# Patient Record
Sex: MALE | Race: BLACK OR AFRICAN AMERICAN | Employment: OTHER | URBAN - METROPOLITAN AREA
[De-identification: names, ages, dates, MRNs, and addresses within clinical notes are randomized per-mention and may not be internally consistent; named-entity substitution may affect disease eponyms.]

---

## 2017-01-24 ENCOUNTER — NURSE ONLY (OUTPATIENT)
Dept: HEMATOLOGY/ONCOLOGY | Facility: HOSPITAL | Age: 72
End: 2017-01-24
Attending: INTERNAL MEDICINE
Payer: MEDICARE

## 2017-01-24 DIAGNOSIS — D63.1 ANEMIA OF CHRONIC RENAL FAILURE, STAGE 3 (MODERATE) (HCC): ICD-10-CM

## 2017-01-24 DIAGNOSIS — N18.30 CHRONIC KIDNEY DISEASE, STAGE III (MODERATE) (HCC): Primary | ICD-10-CM

## 2017-01-24 DIAGNOSIS — N18.30 ANEMIA OF CHRONIC RENAL FAILURE, STAGE 3 (MODERATE) (HCC): ICD-10-CM

## 2017-01-24 LAB
FERRITIN SERPL IA-MCNC: 256 NG/ML (ref 24–336)
HCT VFR BLD AUTO: 31.9 % (ref 41–52)
HGB BLD-MCNC: 10.5 G/DL (ref 13.5–17.5)
IRON SATN MFR SERPL: 19 % (ref 20–55)
IRON SERPL-MCNC: 45 MCG/DL (ref 45–182)
TIBC SERPL-MCNC: 240 MCG/DL (ref 228–428)
TRANSFERRIN SERPL-MCNC: 182 MG/DL (ref 180–329)

## 2017-01-24 PROCEDURE — 96372 THER/PROPH/DIAG INJ SC/IM: CPT

## 2017-01-24 PROCEDURE — 36415 COLL VENOUS BLD VENIPUNCTURE: CPT

## 2017-01-24 PROCEDURE — 85018 HEMOGLOBIN: CPT

## 2017-01-24 PROCEDURE — 83540 ASSAY OF IRON: CPT

## 2017-01-24 PROCEDURE — 84466 ASSAY OF TRANSFERRIN: CPT

## 2017-01-24 PROCEDURE — 85014 HEMATOCRIT: CPT

## 2017-01-24 PROCEDURE — 82728 ASSAY OF FERRITIN: CPT

## 2017-01-24 NOTE — PROGRESS NOTES
Pt here for Q 5 week h/h and possible aranesp. The iron studies (tibc/iron, ferritin) are Q 12 weeks and due in 1-2 weeks, so pt agreed to have these drawn today as well. He is ambulatory, by himself today, and states he is feeling well.     Labs drawn vi

## 2017-01-25 ENCOUNTER — HOSPITAL (OUTPATIENT)
Dept: OTHER | Age: 72
End: 2017-01-25
Attending: INTERNAL MEDICINE

## 2017-01-27 PROBLEM — D63.1 ANEMIA IN CHRONIC KIDNEY DISEASE(285.21): Status: ACTIVE | Noted: 2017-01-27

## 2017-01-27 PROBLEM — D50.9 IRON DEFICIENCY ANEMIA, UNSPECIFIED: Status: ACTIVE | Noted: 2017-01-27

## 2017-01-27 PROBLEM — N18.9 ANEMIA IN CHRONIC KIDNEY DISEASE(285.21): Status: ACTIVE | Noted: 2017-01-27

## 2017-01-28 ENCOUNTER — APPOINTMENT (OUTPATIENT)
Dept: LAB | Facility: HOSPITAL | Age: 72
End: 2017-01-28
Attending: INTERNAL MEDICINE
Payer: MEDICARE

## 2017-01-28 DIAGNOSIS — I10 ESSENTIAL HYPERTENSION: ICD-10-CM

## 2017-01-28 DIAGNOSIS — N17.9 ACUTE KIDNEY FAILURE, UNSPECIFIED (HCC): ICD-10-CM

## 2017-01-28 DIAGNOSIS — E11.8 TYPE II DIABETES MELLITUS WITH COMPLICATION (HCC): ICD-10-CM

## 2017-01-28 DIAGNOSIS — D64.9 ANEMIA, UNSPECIFIED: ICD-10-CM

## 2017-01-28 LAB
ALBUMIN SERPL BCP-MCNC: 2.8 G/DL (ref 3.5–4.8)
ANION GAP SERPL CALC-SCNC: 10 MMOL/L (ref 0–18)
BACTERIA UR QL AUTO: NEGATIVE /HPF
BILIRUB UR QL: NEGATIVE
BUN SERPL-MCNC: 29 MG/DL (ref 8–20)
BUN/CREAT SERPL: 19 (ref 10–20)
CALCIUM SERPL-MCNC: 8.3 MG/DL (ref 8.5–10.5)
CHLORIDE SERPL-SCNC: 103 MMOL/L (ref 95–110)
CLARITY UR: CLEAR
CO2 SERPL-SCNC: 27 MMOL/L (ref 22–32)
COLOR UR: YELLOW
CREAT SERPL-MCNC: 1.53 MG/DL (ref 0.5–1.5)
CREAT UR-MCNC: 68.4 MG/DL
GLUCOSE SERPL-MCNC: 198 MG/DL (ref 70–99)
GLUCOSE UR-MCNC: NEGATIVE MG/DL
KETONES UR-MCNC: NEGATIVE MG/DL
LEUKOCYTE ESTERASE UR QL STRIP.AUTO: NEGATIVE
MICROALBUMIN UR-MCNC: 135.6 MG/DL (ref 0–1.8)
MICROALBUMIN/CREAT UR: 1982.5 MG/G{CREAT} (ref 0–20)
NITRITE UR QL STRIP.AUTO: NEGATIVE
OSMOLALITY UR CALC.SUM OF ELEC: 301 MOSM/KG (ref 275–295)
PH UR: 5 [PH] (ref 5–8)
PHOSPHATE SERPL-MCNC: 2.4 MG/DL (ref 2.4–4.7)
POTASSIUM SERPL-SCNC: 3.5 MMOL/L (ref 3.3–5.1)
PROT UR-MCNC: 100 MG/DL
RBC #/AREA URNS AUTO: <1 /HPF
SODIUM SERPL-SCNC: 140 MMOL/L (ref 136–144)
SP GR UR STRIP: 1.01 (ref 1–1.03)
UROBILINOGEN UR STRIP-ACNC: <2
VIT C UR-MCNC: NEGATIVE MG/DL
WBC #/AREA URNS AUTO: 1 /HPF

## 2017-01-28 PROCEDURE — 83970 ASSAY OF PARATHORMONE: CPT

## 2017-01-28 PROCEDURE — 82043 UR ALBUMIN QUANTITATIVE: CPT

## 2017-01-28 PROCEDURE — 85014 HEMATOCRIT: CPT

## 2017-01-28 PROCEDURE — 36415 COLL VENOUS BLD VENIPUNCTURE: CPT

## 2017-01-28 PROCEDURE — 80069 RENAL FUNCTION PANEL: CPT

## 2017-01-28 PROCEDURE — 85018 HEMOGLOBIN: CPT

## 2017-01-28 PROCEDURE — 82570 ASSAY OF URINE CREATININE: CPT

## 2017-01-28 PROCEDURE — 81001 URINALYSIS AUTO W/SCOPE: CPT

## 2017-01-30 LAB — PTH-INTACT SERPL-MCNC: 65.5 PG/ML (ref 12–88)

## 2017-01-31 ENCOUNTER — OFFICE VISIT (OUTPATIENT)
Dept: HEMATOLOGY/ONCOLOGY | Facility: HOSPITAL | Age: 72
End: 2017-01-31
Attending: INTERNAL MEDICINE
Payer: MEDICARE

## 2017-01-31 VITALS
RESPIRATION RATE: 16 BRPM | SYSTOLIC BLOOD PRESSURE: 158 MMHG | DIASTOLIC BLOOD PRESSURE: 69 MMHG | TEMPERATURE: 99 F | HEART RATE: 76 BPM

## 2017-01-31 DIAGNOSIS — N18.9 ANEMIA IN CHRONIC KIDNEY DISEASE(285.21): Primary | ICD-10-CM

## 2017-01-31 DIAGNOSIS — D50.9 IRON DEFICIENCY ANEMIA, UNSPECIFIED: ICD-10-CM

## 2017-01-31 DIAGNOSIS — D63.1 ANEMIA IN CHRONIC KIDNEY DISEASE(285.21): Primary | ICD-10-CM

## 2017-01-31 DIAGNOSIS — N18.30 CHRONIC KIDNEY DISEASE, STAGE III (MODERATE) (HCC): ICD-10-CM

## 2017-01-31 PROCEDURE — 96374 THER/PROPH/DIAG INJ IV PUSH: CPT

## 2017-01-31 RX ORDER — SODIUM CHLORIDE 0.9 % (FLUSH) 0.9 %
SYRINGE (ML) INJECTION
Status: DISCONTINUED
Start: 2017-01-31 | End: 2017-01-31

## 2017-01-31 NOTE — PROGRESS NOTES
Pt presents to infusion today for 1 of 5 venofer. B/P elevated will recheck at end of infusion. Pt states that he has been fatigued, light headed and dizzy at times. He is also short of breath but attributes it to a recent episode of pneumonia.   Pt stat

## 2017-02-01 ENCOUNTER — OFFICE VISIT (OUTPATIENT)
Dept: HEMATOLOGY/ONCOLOGY | Facility: HOSPITAL | Age: 72
End: 2017-02-01
Attending: INTERNAL MEDICINE
Payer: MEDICARE

## 2017-02-01 VITALS
HEART RATE: 76 BPM | TEMPERATURE: 99 F | RESPIRATION RATE: 16 BRPM | SYSTOLIC BLOOD PRESSURE: 147 MMHG | DIASTOLIC BLOOD PRESSURE: 61 MMHG

## 2017-02-01 DIAGNOSIS — D63.1 ANEMIA IN CHRONIC KIDNEY DISEASE(285.21): Primary | ICD-10-CM

## 2017-02-01 DIAGNOSIS — D50.9 IRON DEFICIENCY ANEMIA, UNSPECIFIED: ICD-10-CM

## 2017-02-01 DIAGNOSIS — N18.30 CHRONIC KIDNEY DISEASE, STAGE III (MODERATE) (HCC): ICD-10-CM

## 2017-02-01 DIAGNOSIS — N18.9 ANEMIA IN CHRONIC KIDNEY DISEASE(285.21): Primary | ICD-10-CM

## 2017-02-01 PROCEDURE — 96374 THER/PROPH/DIAG INJ IV PUSH: CPT

## 2017-02-01 RX ORDER — SODIUM CHLORIDE 0.9 % (FLUSH) 0.9 %
SYRINGE (ML) INJECTION
Status: DISCONTINUED
Start: 2017-02-01 | End: 2017-02-01

## 2017-02-01 NOTE — PROGRESS NOTES
Pt here for 2 of 5 venofer. . Continue to have mild sob on excertion,fatigue and occasional dizziness. Recovering from recent pneumonia. Iv started to right hand, venofer given over 5 mins and tolerated well. bp elevated  On arrival ,rechecked and better.  P

## 2017-02-06 ENCOUNTER — OFFICE VISIT (OUTPATIENT)
Dept: HEMATOLOGY/ONCOLOGY | Facility: HOSPITAL | Age: 72
End: 2017-02-06
Attending: INTERNAL MEDICINE
Payer: MEDICARE

## 2017-02-06 VITALS — HEART RATE: 79 BPM | SYSTOLIC BLOOD PRESSURE: 165 MMHG | RESPIRATION RATE: 18 BRPM | DIASTOLIC BLOOD PRESSURE: 71 MMHG

## 2017-02-06 DIAGNOSIS — N18.9 ANEMIA IN CHRONIC KIDNEY DISEASE(285.21): Primary | ICD-10-CM

## 2017-02-06 DIAGNOSIS — N18.30 CHRONIC KIDNEY DISEASE, STAGE III (MODERATE) (HCC): ICD-10-CM

## 2017-02-06 DIAGNOSIS — D63.1 ANEMIA IN CHRONIC KIDNEY DISEASE(285.21): Primary | ICD-10-CM

## 2017-02-06 DIAGNOSIS — D50.9 IRON DEFICIENCY ANEMIA, UNSPECIFIED: ICD-10-CM

## 2017-02-06 PROCEDURE — 96374 THER/PROPH/DIAG INJ IV PUSH: CPT

## 2017-02-06 RX ORDER — SODIUM CHLORIDE 0.9 % (FLUSH) 0.9 %
SYRINGE (ML) INJECTION
Status: DISCONTINUED
Start: 2017-02-06 | End: 2017-02-06

## 2017-02-06 NOTE — PROGRESS NOTES
Pt to infusion for Venofer 3/5. Labs from 1/28 Hgb 10 Hct 31.1. Pt reports some fatigue and SOB, especially with exertion. Denies any s/e from previous Venofer infusions. PIV started in [facility] on 2nd attempt.  Venofer infusion given over 5 minutes without any

## 2017-02-09 ENCOUNTER — TELEPHONE (OUTPATIENT)
Dept: HEMATOLOGY/ONCOLOGY | Facility: HOSPITAL | Age: 72
End: 2017-02-09

## 2017-02-09 ENCOUNTER — APPOINTMENT (OUTPATIENT)
Dept: CT IMAGING | Facility: HOSPITAL | Age: 72
End: 2017-02-09
Attending: EMERGENCY MEDICINE
Payer: MEDICARE

## 2017-02-09 ENCOUNTER — HOSPITAL ENCOUNTER (EMERGENCY)
Facility: HOSPITAL | Age: 72
Discharge: HOME | End: 2017-02-09
Attending: EMERGENCY MEDICINE
Payer: MEDICARE

## 2017-02-09 ENCOUNTER — APPOINTMENT (OUTPATIENT)
Dept: GENERAL RADIOLOGY | Facility: HOSPITAL | Age: 72
End: 2017-02-09
Attending: EMERGENCY MEDICINE
Payer: MEDICARE

## 2017-02-09 ENCOUNTER — OFFICE VISIT (OUTPATIENT)
Dept: HEMATOLOGY/ONCOLOGY | Facility: HOSPITAL | Age: 72
End: 2017-02-09
Attending: INTERNAL MEDICINE
Payer: MEDICARE

## 2017-02-09 VITALS
RESPIRATION RATE: 18 BRPM | DIASTOLIC BLOOD PRESSURE: 80 MMHG | OXYGEN SATURATION: 98 % | TEMPERATURE: 98 F | BODY MASS INDEX: 30.66 KG/M2 | HEIGHT: 65 IN | WEIGHT: 184 LBS | SYSTOLIC BLOOD PRESSURE: 179 MMHG | HEART RATE: 79 BPM

## 2017-02-09 VITALS
SYSTOLIC BLOOD PRESSURE: 165 MMHG | TEMPERATURE: 98 F | DIASTOLIC BLOOD PRESSURE: 71 MMHG | HEART RATE: 83 BPM | RESPIRATION RATE: 16 BRPM

## 2017-02-09 DIAGNOSIS — N18.9 ANEMIA IN CHRONIC KIDNEY DISEASE(285.21): Primary | ICD-10-CM

## 2017-02-09 DIAGNOSIS — S40.021A ARM CONTUSION, RIGHT, INITIAL ENCOUNTER: ICD-10-CM

## 2017-02-09 DIAGNOSIS — N18.30 CHRONIC KIDNEY DISEASE, STAGE III (MODERATE) (HCC): ICD-10-CM

## 2017-02-09 DIAGNOSIS — D63.1 ANEMIA IN CHRONIC KIDNEY DISEASE(285.21): Primary | ICD-10-CM

## 2017-02-09 DIAGNOSIS — S60.222A CONTUSION OF LEFT HAND, INITIAL ENCOUNTER: ICD-10-CM

## 2017-02-09 DIAGNOSIS — S00.03XA CONTUSION OF SCALP, INITIAL ENCOUNTER: Primary | ICD-10-CM

## 2017-02-09 DIAGNOSIS — D50.9 IRON DEFICIENCY ANEMIA, UNSPECIFIED: ICD-10-CM

## 2017-02-09 PROCEDURE — 73060 X-RAY EXAM OF HUMERUS: CPT

## 2017-02-09 PROCEDURE — 96374 THER/PROPH/DIAG INJ IV PUSH: CPT

## 2017-02-09 PROCEDURE — 73130 X-RAY EXAM OF HAND: CPT

## 2017-02-09 PROCEDURE — 96372 THER/PROPH/DIAG INJ SC/IM: CPT

## 2017-02-09 PROCEDURE — 70450 CT HEAD/BRAIN W/O DYE: CPT

## 2017-02-09 PROCEDURE — 99284 EMERGENCY DEPT VISIT MOD MDM: CPT

## 2017-02-09 RX ORDER — HYDROMORPHONE HYDROCHLORIDE 1 MG/ML
1 INJECTION, SOLUTION INTRAMUSCULAR; INTRAVENOUS; SUBCUTANEOUS ONCE
Status: COMPLETED | OUTPATIENT
Start: 2017-02-09 | End: 2017-02-09

## 2017-02-09 RX ORDER — ONDANSETRON 4 MG/1
4 TABLET, ORALLY DISINTEGRATING ORAL ONCE
Status: COMPLETED | OUTPATIENT
Start: 2017-02-09 | End: 2017-02-09

## 2017-02-09 RX ORDER — SODIUM CHLORIDE 0.9 % (FLUSH) 0.9 %
SYRINGE (ML) INJECTION
Status: DISCONTINUED
Start: 2017-02-09 | End: 2017-02-09

## 2017-02-09 RX ORDER — HYDROCODONE BITARTRATE AND ACETAMINOPHEN 5; 325 MG/1; MG/1
1 TABLET ORAL EVERY 4 HOURS PRN
Qty: 14 TABLET | Refills: 0 | Status: SHIPPED | OUTPATIENT
Start: 2017-02-09 | End: 2017-02-16

## 2017-02-09 NOTE — TELEPHONE ENCOUNTER
Lexis Lewis called and said he just left here receiving Iron and when he got home he took out his garbage and bend over to pick something off the ground and fell out. He laid out on the ground then picked himself up. But he is still disoriented.  Please advise

## 2017-02-09 NOTE — ED NOTES
Pt presents to the er with c/o fall, left wrist pain, right arm pain and shoulder, and abrasion and hematoma to forehead. Pt reports that he bent over to  papers that fell out of a garage can and he toppled froeword on to cement.  Pt sts he sat there

## 2017-02-09 NOTE — ED INITIAL ASSESSMENT (HPI)
Pt states he bent over to  some paper outside today and fall over hit head on pavement dorothea knee pain, + loc

## 2017-02-09 NOTE — TELEPHONE ENCOUNTER
Returned call to patient, patient states after going home from infusion appointment today he was picking up a paper on the ground from the garbage and fell and may have passed out.   He laid on the ground then picked himself up, at first disoriented, better

## 2017-02-09 NOTE — ED PROVIDER NOTES
Patient Seen in: [facility] AND [facility] Emergency Department    History   Patient presents with:  Fall (musculoskeletal, neurologic)    Stated Complaint: fall, head injury     HPI    Patient presents after fall.   Patient went outside to get the trash cans the directed. Budesonide-Formoterol Fumarate (SYMBICORT) 160-4.5 MCG/ACT Inhalation Aerosol,  Inhale 2 puffs into the lungs 2 (two) times daily. Vitamin D3 2000 UNITS Oral Cap,  Take 2,000 Units by mouth daily.    indapamide 2.5 MG Oral Tab,     chlorthalid SpO2 02/09/17 1422 96 %   O2 Device 02/09/17 1422 None (Room air)       Current:/80 mmHg  Pulse 79  Temp(Src) 97.9 °F (36.6 °C) (Temporal)  Resp 18  Ht 165.1 cm (5' 5\")  Wt 83.462 kg  BMI 30.62 kg/m2  SpO2 98%        Physical Exam  Constitutional: Course   Labs Reviewed - No data to display     MDM     96% Normal     Disposition and Plan     Clinical Impression:  Contusion of scalp, initial encounter  (primary encounter diagnosis)  Contusion of left hand, initial encounter  Arm contusion, right, ini

## 2017-02-09 NOTE — ED NOTES
Pt given discharge instructions, prescriptions and follow up. Questions answered and pt verbalized understanding.  Pt discharged home via wc with family

## 2017-02-09 NOTE — PROGRESS NOTES
Pt presents to infusion today for 4 of 5 venofer.  B/P elevated will recheck at end of infusion.  Pt states that he has been fatigued, light headed and dizzy at times.  He is also short of breath but attributes it to a recent episode of pneumonia.  Pt stat
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
## 2017-02-13 ENCOUNTER — OFFICE VISIT (OUTPATIENT)
Dept: HEMATOLOGY/ONCOLOGY | Facility: HOSPITAL | Age: 72
End: 2017-02-13
Attending: INTERNAL MEDICINE
Payer: MEDICARE

## 2017-02-13 VITALS
SYSTOLIC BLOOD PRESSURE: 165 MMHG | RESPIRATION RATE: 16 BRPM | HEART RATE: 87 BPM | TEMPERATURE: 99 F | DIASTOLIC BLOOD PRESSURE: 69 MMHG

## 2017-02-13 DIAGNOSIS — D50.9 IRON DEFICIENCY ANEMIA, UNSPECIFIED: ICD-10-CM

## 2017-02-13 DIAGNOSIS — N18.30 CHRONIC KIDNEY DISEASE, STAGE III (MODERATE) (HCC): ICD-10-CM

## 2017-02-13 DIAGNOSIS — N18.9 ANEMIA IN CHRONIC KIDNEY DISEASE(285.21): Primary | ICD-10-CM

## 2017-02-13 DIAGNOSIS — D63.1 ANEMIA IN CHRONIC KIDNEY DISEASE(285.21): Primary | ICD-10-CM

## 2017-02-13 PROCEDURE — 96374 THER/PROPH/DIAG INJ IV PUSH: CPT

## 2017-02-13 RX ORDER — 0.9 % SODIUM CHLORIDE 0.9 %
VIAL (ML) INJECTION
Status: DISCONTINUED
Start: 2017-02-13 | End: 2017-02-13

## 2017-02-13 NOTE — PROGRESS NOTES
Arleen Allen to infusion today for 5 of 5 Venofer. He arrives with his wife. After patient's last Venofer, patient had fallen at home. He states he felt fine, was not dizzy or lightheaded.  He states he went to pick-up a piece of trash from the ground and he kep

## 2017-02-28 ENCOUNTER — NURSE ONLY (OUTPATIENT)
Dept: HEMATOLOGY/ONCOLOGY | Facility: HOSPITAL | Age: 72
End: 2017-02-28
Attending: INTERNAL MEDICINE
Payer: MEDICARE

## 2017-02-28 VITALS
HEART RATE: 75 BPM | TEMPERATURE: 98 F | DIASTOLIC BLOOD PRESSURE: 79 MMHG | RESPIRATION RATE: 18 BRPM | SYSTOLIC BLOOD PRESSURE: 164 MMHG

## 2017-02-28 DIAGNOSIS — N18.30 ANEMIA OF CHRONIC RENAL FAILURE, STAGE 3 (MODERATE) (HCC): ICD-10-CM

## 2017-02-28 DIAGNOSIS — D63.1 ANEMIA OF CHRONIC RENAL FAILURE, STAGE 3 (MODERATE) (HCC): ICD-10-CM

## 2017-02-28 DIAGNOSIS — N18.30 CHRONIC KIDNEY DISEASE, STAGE III (MODERATE) (HCC): Primary | ICD-10-CM

## 2017-02-28 LAB
HCT VFR BLD AUTO: 34.4 % (ref 41–52)
HGB BLD-MCNC: 11.4 G/DL (ref 13.5–17.5)

## 2017-02-28 PROCEDURE — 85018 HEMOGLOBIN: CPT

## 2017-02-28 PROCEDURE — 36415 COLL VENOUS BLD VENIPUNCTURE: CPT

## 2017-02-28 PROCEDURE — 85014 HEMATOCRIT: CPT

## 2017-02-28 NOTE — PROGRESS NOTES
Pt here for Q 5 week h/h and possible aranesp. The iron studies (tibc/iron, ferritin) were Q 12 weeks and drawb in Jan.  He is ambulatory, by himself today, and states he is feeling well. He did fall 2/9/17 - sustained contusions.   Denies feeling weak or

## 2017-04-04 ENCOUNTER — NURSE ONLY (OUTPATIENT)
Dept: HEMATOLOGY/ONCOLOGY | Facility: HOSPITAL | Age: 72
End: 2017-04-04
Attending: INTERNAL MEDICINE
Payer: MEDICARE

## 2017-04-04 VITALS
RESPIRATION RATE: 18 BRPM | DIASTOLIC BLOOD PRESSURE: 62 MMHG | HEART RATE: 76 BPM | TEMPERATURE: 99 F | SYSTOLIC BLOOD PRESSURE: 152 MMHG

## 2017-04-04 DIAGNOSIS — N18.30 CHRONIC KIDNEY DISEASE, STAGE III (MODERATE) (HCC): Primary | ICD-10-CM

## 2017-04-04 DIAGNOSIS — N18.30 ANEMIA OF CHRONIC RENAL FAILURE, STAGE 3 (MODERATE) (HCC): ICD-10-CM

## 2017-04-04 DIAGNOSIS — D63.1 ANEMIA OF CHRONIC RENAL FAILURE, STAGE 3 (MODERATE) (HCC): ICD-10-CM

## 2017-04-04 PROCEDURE — 36415 COLL VENOUS BLD VENIPUNCTURE: CPT

## 2017-04-04 PROCEDURE — 96372 THER/PROPH/DIAG INJ SC/IM: CPT

## 2017-04-04 PROCEDURE — 85018 HEMOGLOBIN: CPT

## 2017-04-04 PROCEDURE — 85014 HEMATOCRIT: CPT

## 2017-04-04 NOTE — PROGRESS NOTES
Pt here for Q 5 week h/h and possible aranesp. Labs drawn via left ac x1.  2x2 gauze and coban wrap applied. Pt denied any dizziness/fatigue ,some sob on excertion. Pt sent to waiting  area -  await h/h results.      Recent Labs   Lab  04/04/17   [id number]

## 2017-04-25 PROBLEM — G47.33 OSA (OBSTRUCTIVE SLEEP APNEA): Status: ACTIVE | Noted: 2017-04-25

## 2017-04-25 PROBLEM — J45.40 MODERATE PERSISTENT ASTHMA WITHOUT COMPLICATION (HCC): Status: ACTIVE | Noted: 2017-04-25

## 2017-04-25 PROBLEM — J45.40 MODERATE PERSISTENT ASTHMA WITHOUT COMPLICATION: Status: ACTIVE | Noted: 2017-04-25

## 2017-05-09 ENCOUNTER — NURSE ONLY (OUTPATIENT)
Dept: HEMATOLOGY/ONCOLOGY | Facility: HOSPITAL | Age: 72
End: 2017-05-09
Attending: INTERNAL MEDICINE
Payer: MEDICARE

## 2017-05-09 VITALS
RESPIRATION RATE: 18 BRPM | SYSTOLIC BLOOD PRESSURE: 143 MMHG | HEART RATE: 75 BPM | TEMPERATURE: 98 F | DIASTOLIC BLOOD PRESSURE: 65 MMHG

## 2017-05-09 DIAGNOSIS — N18.30 ANEMIA OF CHRONIC RENAL FAILURE, STAGE 3 (MODERATE) (HCC): ICD-10-CM

## 2017-05-09 DIAGNOSIS — D63.1 ANEMIA OF CHRONIC RENAL FAILURE, STAGE 3 (MODERATE) (HCC): ICD-10-CM

## 2017-05-09 DIAGNOSIS — N18.30 CHRONIC KIDNEY DISEASE, STAGE III (MODERATE) (HCC): Primary | ICD-10-CM

## 2017-05-09 PROCEDURE — 84466 ASSAY OF TRANSFERRIN: CPT

## 2017-05-09 PROCEDURE — 85018 HEMOGLOBIN: CPT

## 2017-05-09 PROCEDURE — 82728 ASSAY OF FERRITIN: CPT

## 2017-05-09 PROCEDURE — 85014 HEMATOCRIT: CPT

## 2017-05-09 PROCEDURE — 83540 ASSAY OF IRON: CPT

## 2017-05-09 PROCEDURE — 36415 COLL VENOUS BLD VENIPUNCTURE: CPT

## 2017-05-09 NOTE — PROGRESS NOTES
Pt here for Q 5 week h/h and possible aranesp. The iron studies (tibc/iron, ferritin) are Q 12 weeks, last drawn in January -- drawn again today, 5/9. He is ambulatory, by himself today. Jesúskobyselina Barreto He did fall 2/9/17 - sustained contusions.   Denies feeling weak o

## 2017-06-13 ENCOUNTER — NURSE ONLY (OUTPATIENT)
Dept: HEMATOLOGY/ONCOLOGY | Facility: HOSPITAL | Age: 72
End: 2017-06-13
Attending: INTERNAL MEDICINE
Payer: MEDICARE

## 2017-06-13 VITALS
RESPIRATION RATE: 18 BRPM | TEMPERATURE: 99 F | SYSTOLIC BLOOD PRESSURE: 155 MMHG | DIASTOLIC BLOOD PRESSURE: 63 MMHG | HEART RATE: 72 BPM

## 2017-06-13 DIAGNOSIS — N18.30 CHRONIC KIDNEY DISEASE, STAGE III (MODERATE) (HCC): Primary | ICD-10-CM

## 2017-06-13 DIAGNOSIS — N18.30 ANEMIA OF CHRONIC RENAL FAILURE, STAGE 3 (MODERATE) (HCC): ICD-10-CM

## 2017-06-13 DIAGNOSIS — D63.1 ANEMIA OF CHRONIC RENAL FAILURE, STAGE 3 (MODERATE) (HCC): ICD-10-CM

## 2017-06-13 PROCEDURE — 36415 COLL VENOUS BLD VENIPUNCTURE: CPT

## 2017-06-13 PROCEDURE — 96372 THER/PROPH/DIAG INJ SC/IM: CPT

## 2017-06-13 PROCEDURE — 85014 HEMATOCRIT: CPT

## 2017-06-13 PROCEDURE — 85018 HEMOGLOBIN: CPT

## 2017-06-13 NOTE — PROGRESS NOTES
Patient here for lab draw escorted from lobby with steady gait. H&H drawn on second attempt from right AC. 2X2 and coban applied to insertion site. Patient escorted to lobby to wait for results and possible arenesp.       Patient to infusion for Aranesp i

## 2017-06-15 PROBLEM — J45.40 MODERATE PERSISTENT ASTHMA WITHOUT COMPLICATION: Status: RESOLVED | Noted: 2017-04-25 | Resolved: 2017-06-15

## 2017-06-15 PROBLEM — J45.40 MODERATE PERSISTENT ASTHMA WITHOUT COMPLICATION (HCC): Status: RESOLVED | Noted: 2017-04-25 | Resolved: 2017-06-15

## 2017-06-15 PROBLEM — N18.9 ANEMIA IN CHRONIC KIDNEY DISEASE(285.21): Status: RESOLVED | Noted: 2017-01-27 | Resolved: 2017-06-15

## 2017-06-15 PROBLEM — D50.9 IRON DEFICIENCY ANEMIA, UNSPECIFIED: Status: RESOLVED | Noted: 2017-01-27 | Resolved: 2017-06-15

## 2017-06-15 PROBLEM — D63.1 ANEMIA IN CHRONIC KIDNEY DISEASE(285.21): Status: RESOLVED | Noted: 2017-01-27 | Resolved: 2017-06-15

## 2017-07-18 ENCOUNTER — NURSE ONLY (OUTPATIENT)
Dept: HEMATOLOGY/ONCOLOGY | Facility: HOSPITAL | Age: 72
End: 2017-07-18
Attending: INTERNAL MEDICINE
Payer: MEDICARE

## 2017-07-18 VITALS — SYSTOLIC BLOOD PRESSURE: 157 MMHG | HEART RATE: 73 BPM | TEMPERATURE: 98 F | DIASTOLIC BLOOD PRESSURE: 58 MMHG

## 2017-07-18 DIAGNOSIS — N18.30 ANEMIA OF CHRONIC RENAL FAILURE, STAGE 3 (MODERATE) (HCC): ICD-10-CM

## 2017-07-18 DIAGNOSIS — N18.30 CHRONIC KIDNEY DISEASE, STAGE III (MODERATE) (HCC): Primary | ICD-10-CM

## 2017-07-18 DIAGNOSIS — D63.1 ANEMIA OF CHRONIC RENAL FAILURE, STAGE 3 (MODERATE) (HCC): ICD-10-CM

## 2017-07-18 LAB
HCT VFR BLD AUTO: 30.3 % (ref 41–52)
HGB BLD-MCNC: 10.2 G/DL (ref 13.5–17.5)

## 2017-07-18 PROCEDURE — 85014 HEMATOCRIT: CPT

## 2017-07-18 PROCEDURE — 36415 COLL VENOUS BLD VENIPUNCTURE: CPT

## 2017-07-18 PROCEDURE — 96372 THER/PROPH/DIAG INJ SC/IM: CPT

## 2017-07-18 PROCEDURE — 85018 HEMOGLOBIN: CPT

## 2017-07-18 RX ORDER — HEPARIN SODIUM (PORCINE) LOCK FLUSH IV SOLN 100 UNIT/ML 100 UNIT/ML
SOLUTION INTRAVENOUS
Status: DISCONTINUED
Start: 2017-07-18 | End: 2017-07-18

## 2017-07-18 NOTE — PROGRESS NOTES
Patient to infusion for  H & H and possible Aranesp  Patient arrived ambulatory and reports feeling \"ok\" . He has increased fatigue and SOB with the heat and increased activity . No other health changes or concerns voiced .   Labs drawn peripherally from

## 2017-08-22 ENCOUNTER — NURSE ONLY (OUTPATIENT)
Dept: HEMATOLOGY/ONCOLOGY | Facility: HOSPITAL | Age: 72
End: 2017-08-22
Attending: INTERNAL MEDICINE
Payer: MEDICARE

## 2017-08-22 VITALS
RESPIRATION RATE: 18 BRPM | HEART RATE: 71 BPM | DIASTOLIC BLOOD PRESSURE: 64 MMHG | SYSTOLIC BLOOD PRESSURE: 147 MMHG | TEMPERATURE: 99 F

## 2017-08-22 DIAGNOSIS — N18.30 CHRONIC KIDNEY DISEASE, STAGE III (MODERATE) (HCC): Primary | ICD-10-CM

## 2017-08-22 DIAGNOSIS — D63.1 ANEMIA OF CHRONIC RENAL FAILURE, STAGE 3 (MODERATE) (HCC): ICD-10-CM

## 2017-08-22 DIAGNOSIS — N18.30 ANEMIA OF CHRONIC RENAL FAILURE, STAGE 3 (MODERATE) (HCC): ICD-10-CM

## 2017-08-22 LAB
FERRITIN SERPL IA-MCNC: 305 NG/ML (ref 24–336)
HCT VFR BLD AUTO: 31.4 % (ref 41–52)
HGB BLD-MCNC: 10.8 G/DL (ref 13.5–17.5)
IRON SATN MFR SERPL: 28 % (ref 20–55)
IRON SERPL-MCNC: 63 MCG/DL (ref 45–182)
TIBC SERPL-MCNC: 226 MCG/DL (ref 228–428)
TRANSFERRIN SERPL-MCNC: 171 MG/DL (ref 180–329)

## 2017-08-22 PROCEDURE — 85014 HEMATOCRIT: CPT

## 2017-08-22 PROCEDURE — 36415 COLL VENOUS BLD VENIPUNCTURE: CPT

## 2017-08-22 PROCEDURE — 82728 ASSAY OF FERRITIN: CPT

## 2017-08-22 PROCEDURE — 96372 THER/PROPH/DIAG INJ SC/IM: CPT

## 2017-08-22 PROCEDURE — 83540 ASSAY OF IRON: CPT

## 2017-08-22 PROCEDURE — 84466 ASSAY OF TRANSFERRIN: CPT

## 2017-08-22 PROCEDURE — 85018 HEMOGLOBIN: CPT

## 2017-08-22 NOTE — PROGRESS NOTES
Patient here for lab draw escorted from lobby with steady gait. H&H drawn on first attempt from left AC vein. 2X2 and coban applied to insertion site. Patient escorted to lobby to wait for results and possible arenesp.       Patient to infusion for Joana

## 2017-09-26 ENCOUNTER — NURSE ONLY (OUTPATIENT)
Dept: HEMATOLOGY/ONCOLOGY | Facility: HOSPITAL | Age: 72
End: 2017-09-26
Attending: INTERNAL MEDICINE
Payer: MEDICARE

## 2017-09-26 VITALS
HEART RATE: 72 BPM | SYSTOLIC BLOOD PRESSURE: 181 MMHG | DIASTOLIC BLOOD PRESSURE: 78 MMHG | RESPIRATION RATE: 16 BRPM | TEMPERATURE: 98 F

## 2017-09-26 DIAGNOSIS — N18.30 CHRONIC KIDNEY DISEASE, STAGE III (MODERATE) (HCC): Primary | ICD-10-CM

## 2017-09-26 DIAGNOSIS — N18.30 ANEMIA OF CHRONIC RENAL FAILURE, STAGE 3 (MODERATE) (HCC): ICD-10-CM

## 2017-09-26 DIAGNOSIS — D63.1 ANEMIA OF CHRONIC RENAL FAILURE, STAGE 3 (MODERATE) (HCC): ICD-10-CM

## 2017-09-26 LAB
HCT VFR BLD AUTO: 32 % (ref 41–52)
HGB BLD-MCNC: 10.8 G/DL (ref 13.5–17.5)

## 2017-09-26 PROCEDURE — 85018 HEMOGLOBIN: CPT

## 2017-09-26 PROCEDURE — 85014 HEMATOCRIT: CPT

## 2017-09-26 PROCEDURE — 96372 THER/PROPH/DIAG INJ SC/IM: CPT

## 2017-09-26 PROCEDURE — 36415 COLL VENOUS BLD VENIPUNCTURE: CPT

## 2017-09-26 NOTE — PROGRESS NOTES
Patient to infusion for labs and possible Aranesp injection. HGB = 10.8  HCT = 32.0  Patient has complaint of fatigue, states he is \"feeling slow. \" Also reports increased LIPSCOMB and some dizziness at times.  Reports that this has been worse with the hot wea

## 2017-10-02 ENCOUNTER — APPOINTMENT (OUTPATIENT)
Dept: GENERAL RADIOLOGY | Facility: HOSPITAL | Age: 72
End: 2017-10-02
Attending: EMERGENCY MEDICINE
Payer: MEDICARE

## 2017-10-02 ENCOUNTER — HOSPITAL ENCOUNTER (EMERGENCY)
Facility: HOSPITAL | Age: 72
Discharge: HOME | End: 2017-10-02
Attending: EMERGENCY MEDICINE
Payer: MEDICARE

## 2017-10-02 VITALS
HEART RATE: 72 BPM | RESPIRATION RATE: 18 BRPM | OXYGEN SATURATION: 98 % | DIASTOLIC BLOOD PRESSURE: 76 MMHG | SYSTOLIC BLOOD PRESSURE: 174 MMHG | TEMPERATURE: 98 F

## 2017-10-02 DIAGNOSIS — M10.9 ACUTE GOUT INVOLVING TOE OF LEFT FOOT, UNSPECIFIED CAUSE: Primary | ICD-10-CM

## 2017-10-02 DIAGNOSIS — I10 ESSENTIAL HYPERTENSION: ICD-10-CM

## 2017-10-02 PROCEDURE — 36415 COLL VENOUS BLD VENIPUNCTURE: CPT

## 2017-10-02 PROCEDURE — 99285 EMERGENCY DEPT VISIT HI MDM: CPT

## 2017-10-02 PROCEDURE — 80048 BASIC METABOLIC PNL TOTAL CA: CPT | Performed by: EMERGENCY MEDICINE

## 2017-10-02 PROCEDURE — 93010 ELECTROCARDIOGRAM REPORT: CPT | Performed by: EMERGENCY MEDICINE

## 2017-10-02 PROCEDURE — 93005 ELECTROCARDIOGRAM TRACING: CPT

## 2017-10-02 PROCEDURE — 81001 URINALYSIS AUTO W/SCOPE: CPT | Performed by: EMERGENCY MEDICINE

## 2017-10-02 PROCEDURE — 73630 X-RAY EXAM OF FOOT: CPT | Performed by: EMERGENCY MEDICINE

## 2017-10-02 PROCEDURE — 85025 COMPLETE CBC W/AUTO DIFF WBC: CPT | Performed by: EMERGENCY MEDICINE

## 2017-10-02 RX ORDER — PREDNISONE 20 MG/1
40 TABLET ORAL ONCE
Status: COMPLETED | OUTPATIENT
Start: 2017-10-02 | End: 2017-10-02

## 2017-10-02 RX ORDER — PREDNISONE 20 MG/1
40 TABLET ORAL DAILY
Qty: 10 TABLET | Refills: 0 | Status: SHIPPED | OUTPATIENT
Start: 2017-10-02 | End: 2017-10-07

## 2017-10-02 RX ORDER — TRAMADOL HYDROCHLORIDE 50 MG/1
TABLET ORAL EVERY 4 HOURS PRN
Qty: 15 TABLET | Refills: 0 | Status: SHIPPED | OUTPATIENT
Start: 2017-10-02 | End: 2017-10-03

## 2017-10-02 NOTE — ED NOTES
C/o ringing in his ears and intermittent HA left frontal. Pt states his BP has been elevated since his arenesp infusion a week ago. History of anemia. Gets sob on and off.

## 2017-10-02 NOTE — ED PROVIDER NOTES
Patient Seen in: [facility] AND [facility] Emergency Department    History   Patient presents with:  Hypertension (cardiovascular)    Stated Complaint: pt c/o hypertension over one week with headaches on and off     HPI    29-year-old male with history of hypert All other systems reviewed and negative except as noted above. PSFH elements reviewed from today and agreed except as otherwise stated in HPI.     Physical Exam   ED Triage Vitals [10/02/17 0953]  BP: 146/69  Pulse: 77  Resp: 18  Temp: 98.4 °F (36.9 32.3 (*)     All other components within normal limits   CBC WITH DIFFERENTIAL WITH PLATELET    Narrative: The following orders were created for panel order CBC WITH DIFFERENTIAL WITH PLATELET.   Procedure                               Abnormality 10 tablet Refills: 0    TraMADol HCl 50 MG Oral Tab  Take 1-2 tablets ( mg total) by mouth every 4 (four) hours as needed for Pain.   Qty: 15 tablet Refills: 0

## 2017-10-31 ENCOUNTER — NURSE ONLY (OUTPATIENT)
Dept: HEMATOLOGY/ONCOLOGY | Facility: HOSPITAL | Age: 72
End: 2017-10-31
Attending: INTERNAL MEDICINE
Payer: MEDICARE

## 2017-10-31 DIAGNOSIS — D63.1 ANEMIA OF CHRONIC RENAL FAILURE, STAGE 3 (MODERATE) (HCC): ICD-10-CM

## 2017-10-31 DIAGNOSIS — N18.30 ANEMIA OF CHRONIC RENAL FAILURE, STAGE 3 (MODERATE) (HCC): ICD-10-CM

## 2017-10-31 DIAGNOSIS — N18.30 CHRONIC KIDNEY DISEASE, STAGE III (MODERATE) (HCC): Primary | ICD-10-CM

## 2017-10-31 PROCEDURE — 85018 HEMOGLOBIN: CPT

## 2017-10-31 PROCEDURE — 85014 HEMATOCRIT: CPT

## 2017-10-31 PROCEDURE — 36415 COLL VENOUS BLD VENIPUNCTURE: CPT

## 2017-10-31 NOTE — PROGRESS NOTES
Pt here for blood draw. Labs (H&H) drawn via butterfly 23G from left AC x 1 attempt. Pt tolerated venipuncture well.

## 2017-12-05 ENCOUNTER — NURSE ONLY (OUTPATIENT)
Dept: HEMATOLOGY/ONCOLOGY | Facility: HOSPITAL | Age: 72
End: 2017-12-05
Attending: INTERNAL MEDICINE
Payer: MEDICARE

## 2017-12-05 VITALS
DIASTOLIC BLOOD PRESSURE: 78 MMHG | SYSTOLIC BLOOD PRESSURE: 165 MMHG | RESPIRATION RATE: 16 BRPM | TEMPERATURE: 99 F | HEART RATE: 81 BPM

## 2017-12-05 DIAGNOSIS — N18.30 CHRONIC KIDNEY DISEASE, STAGE III (MODERATE) (HCC): Primary | ICD-10-CM

## 2017-12-05 DIAGNOSIS — D63.1 ANEMIA OF CHRONIC RENAL FAILURE, STAGE 3 (MODERATE) (HCC): ICD-10-CM

## 2017-12-05 DIAGNOSIS — N18.30 ANEMIA OF CHRONIC RENAL FAILURE, STAGE 3 (MODERATE) (HCC): ICD-10-CM

## 2017-12-05 PROCEDURE — 85014 HEMATOCRIT: CPT

## 2017-12-05 PROCEDURE — 36415 COLL VENOUS BLD VENIPUNCTURE: CPT

## 2017-12-05 PROCEDURE — 85018 HEMOGLOBIN: CPT

## 2017-12-05 PROCEDURE — 96372 THER/PROPH/DIAG INJ SC/IM: CPT

## 2017-12-05 PROCEDURE — 82728 ASSAY OF FERRITIN: CPT

## 2017-12-05 PROCEDURE — 83540 ASSAY OF IRON: CPT

## 2017-12-05 PROCEDURE — 84466 ASSAY OF TRANSFERRIN: CPT

## 2017-12-05 NOTE — PROGRESS NOTES
Lucie Moulton presents to lab for H&H with possible aranesp which is ordered every 5 weeks. Iron studies to be drawn every 12 weeks which were last drawn on 8/22.     Lucie Moulton states that he has been feeling no worsening in fatigue or shortness of breath with exerti

## 2018-01-09 ENCOUNTER — TELEPHONE (OUTPATIENT)
Dept: HEMATOLOGY/ONCOLOGY | Facility: HOSPITAL | Age: 73
End: 2018-01-09

## 2018-01-09 ENCOUNTER — NURSE ONLY (OUTPATIENT)
Dept: HEMATOLOGY/ONCOLOGY | Facility: HOSPITAL | Age: 73
End: 2018-01-09
Attending: INTERNAL MEDICINE
Payer: MEDICARE

## 2018-01-09 VITALS
RESPIRATION RATE: 16 BRPM | DIASTOLIC BLOOD PRESSURE: 65 MMHG | TEMPERATURE: 98 F | SYSTOLIC BLOOD PRESSURE: 172 MMHG | HEART RATE: 79 BPM

## 2018-01-09 DIAGNOSIS — N18.30 ANEMIA OF CHRONIC RENAL FAILURE, STAGE 3 (MODERATE) (HCC): ICD-10-CM

## 2018-01-09 DIAGNOSIS — D63.1 ANEMIA OF CHRONIC RENAL FAILURE, STAGE 3 (MODERATE) (HCC): ICD-10-CM

## 2018-01-09 DIAGNOSIS — N18.30 CHRONIC KIDNEY DISEASE, STAGE III (MODERATE) (HCC): Primary | ICD-10-CM

## 2018-01-09 LAB
HCT VFR BLD AUTO: 34.3 % (ref 41–52)
HGB BLD-MCNC: 11.5 G/DL (ref 13.5–17.5)

## 2018-01-09 PROCEDURE — 36415 COLL VENOUS BLD VENIPUNCTURE: CPT

## 2018-01-09 PROCEDURE — 85014 HEMATOCRIT: CPT

## 2018-01-09 PROCEDURE — 85018 HEMOGLOBIN: CPT

## 2018-01-09 NOTE — PROGRESS NOTES
Patient arrives ambulatory for Q5 week HH followed by Aranesp today. On 1 attempt #23g butterfly inserted left ac. Lab obtained as ordered and sent. Gauze and coban to site. Patient was discharged back to lobby [facility]   Instructed will

## 2018-01-09 NOTE — TELEPHONE ENCOUNTER
Effective 1-1-18, patient now has [employer] as insurance.    Please make sure injections are authorized

## 2018-02-13 ENCOUNTER — NURSE ONLY (OUTPATIENT)
Dept: HEMATOLOGY/ONCOLOGY | Facility: HOSPITAL | Age: 73
End: 2018-02-13
Attending: INTERNAL MEDICINE
Payer: MEDICARE

## 2018-02-13 VITALS
HEART RATE: 77 BPM | DIASTOLIC BLOOD PRESSURE: 67 MMHG | SYSTOLIC BLOOD PRESSURE: 147 MMHG | RESPIRATION RATE: 16 BRPM | TEMPERATURE: 98 F

## 2018-02-13 DIAGNOSIS — N18.30 CHRONIC KIDNEY DISEASE, STAGE III (MODERATE) (HCC): Primary | ICD-10-CM

## 2018-02-13 DIAGNOSIS — N18.30 ANEMIA OF CHRONIC RENAL FAILURE, STAGE 3 (MODERATE) (HCC): ICD-10-CM

## 2018-02-13 DIAGNOSIS — D63.1 ANEMIA OF CHRONIC RENAL FAILURE, STAGE 3 (MODERATE) (HCC): ICD-10-CM

## 2018-02-13 LAB
HCT VFR BLD AUTO: 31.9 % (ref 41–52)
HGB BLD-MCNC: 10.8 G/DL (ref 13.5–17.5)

## 2018-02-13 PROCEDURE — 36415 COLL VENOUS BLD VENIPUNCTURE: CPT

## 2018-02-13 PROCEDURE — 85014 HEMATOCRIT: CPT

## 2018-02-13 PROCEDURE — 85018 HEMOGLOBIN: CPT

## 2018-02-13 PROCEDURE — 96372 THER/PROPH/DIAG INJ SC/IM: CPT

## 2018-02-13 NOTE — PROGRESS NOTES
Patient to infusion for Aranesp injection.     Lab Results  Component Value Date   HGB 10.8 (L) 02/13/2018   HCT 31.9 (L) 02/13/2018      Patient reports he is feeling \"okay\", reports he was in a rush this morning and that when he rushes, he often gets ti

## 2018-03-20 ENCOUNTER — NURSE ONLY (OUTPATIENT)
Dept: HEMATOLOGY/ONCOLOGY | Facility: HOSPITAL | Age: 73
End: 2018-03-20
Attending: INTERNAL MEDICINE
Payer: MEDICARE

## 2018-03-20 VITALS
DIASTOLIC BLOOD PRESSURE: 66 MMHG | TEMPERATURE: 98 F | SYSTOLIC BLOOD PRESSURE: 152 MMHG | HEART RATE: 79 BPM | RESPIRATION RATE: 16 BRPM

## 2018-03-20 DIAGNOSIS — N18.30 CHRONIC KIDNEY DISEASE, STAGE III (MODERATE) (HCC): Primary | ICD-10-CM

## 2018-03-20 DIAGNOSIS — N18.30 ANEMIA OF CHRONIC RENAL FAILURE, STAGE 3 (MODERATE) (HCC): ICD-10-CM

## 2018-03-20 DIAGNOSIS — D63.1 ANEMIA OF CHRONIC RENAL FAILURE, STAGE 3 (MODERATE) (HCC): ICD-10-CM

## 2018-03-20 LAB
FERRITIN SERPL IA-MCNC: 367 NG/ML (ref 24–336)
HCT VFR BLD AUTO: 33.2 % (ref 41–52)
HGB BLD-MCNC: 11.1 G/DL (ref 13.5–17.5)
IRON SATN MFR SERPL: 23 % (ref 20–55)
IRON SERPL-MCNC: 52 MCG/DL (ref 45–182)
TIBC SERPL-MCNC: 231 MCG/DL (ref 228–428)
TRANSFERRIN SERPL-MCNC: 175 MG/DL (ref 180–329)

## 2018-03-20 PROCEDURE — 85018 HEMOGLOBIN: CPT

## 2018-03-20 PROCEDURE — 84466 ASSAY OF TRANSFERRIN: CPT

## 2018-03-20 PROCEDURE — 85014 HEMATOCRIT: CPT

## 2018-03-20 PROCEDURE — 83540 ASSAY OF IRON: CPT

## 2018-03-20 PROCEDURE — 82728 ASSAY OF FERRITIN: CPT

## 2018-03-20 PROCEDURE — 36415 COLL VENOUS BLD VENIPUNCTURE: CPT

## 2018-03-20 NOTE — PROGRESS NOTES
Pt here for Q 5 week h/h & iron studies and possible aranesp. The iron studies (tibc/iron, ferritin) are Q 12 weeks). He is ambulatory, by himself today. Olive Farris He did fall 2/9/17 - sustained contusions. Denies feeling weak or dizzy lately. He is fatigued.

## 2018-03-27 PROBLEM — N18.30 CHRONIC KIDNEY DISEASE, STAGE III (MODERATE) (HCC): Status: ACTIVE | Noted: 2018-03-27

## 2018-03-27 PROBLEM — D64.9 ANEMIA, UNSPECIFIED: Status: ACTIVE | Noted: 2018-03-27

## 2018-04-03 ENCOUNTER — APPOINTMENT (OUTPATIENT)
Dept: LAB | Facility: HOSPITAL | Age: 73
End: 2018-04-03
Attending: INTERNAL MEDICINE
Payer: MEDICARE

## 2018-04-03 DIAGNOSIS — D64.9 ANEMIA, UNSPECIFIED TYPE: ICD-10-CM

## 2018-04-03 DIAGNOSIS — N18.30 CHRONIC KIDNEY DISEASE, STAGE III (MODERATE) (HCC): ICD-10-CM

## 2018-04-03 PROCEDURE — 85018 HEMOGLOBIN: CPT

## 2018-04-03 PROCEDURE — 83540 ASSAY OF IRON: CPT

## 2018-04-03 PROCEDURE — 84466 ASSAY OF TRANSFERRIN: CPT

## 2018-04-03 PROCEDURE — 85014 HEMATOCRIT: CPT

## 2018-04-03 PROCEDURE — 82728 ASSAY OF FERRITIN: CPT

## 2018-04-03 PROCEDURE — 80069 RENAL FUNCTION PANEL: CPT

## 2018-04-03 PROCEDURE — 36415 COLL VENOUS BLD VENIPUNCTURE: CPT

## 2018-04-20 RX ORDER — OFLOXACIN 3 MG/ML
SOLUTION/ DROPS OPHTHALMIC
Qty: 10 ML | Refills: 0 | OUTPATIENT
Start: 2018-04-20

## 2018-04-24 ENCOUNTER — NURSE ONLY (OUTPATIENT)
Dept: HEMATOLOGY/ONCOLOGY | Facility: HOSPITAL | Age: 73
End: 2018-04-24
Attending: INTERNAL MEDICINE
Payer: MEDICARE

## 2018-04-24 VITALS
TEMPERATURE: 99 F | SYSTOLIC BLOOD PRESSURE: 160 MMHG | HEART RATE: 76 BPM | RESPIRATION RATE: 16 BRPM | DIASTOLIC BLOOD PRESSURE: 66 MMHG

## 2018-04-24 DIAGNOSIS — D64.9 ANEMIA, UNSPECIFIED: ICD-10-CM

## 2018-04-24 DIAGNOSIS — N18.30 CHRONIC KIDNEY DISEASE, STAGE III (MODERATE) (HCC): Primary | ICD-10-CM

## 2018-04-24 PROCEDURE — 96372 THER/PROPH/DIAG INJ SC/IM: CPT

## 2018-04-24 PROCEDURE — 85018 HEMOGLOBIN: CPT

## 2018-04-24 PROCEDURE — 85014 HEMATOCRIT: CPT

## 2018-04-24 PROCEDURE — 36415 COLL VENOUS BLD VENIPUNCTURE: CPT

## 2018-04-24 NOTE — PROGRESS NOTES
.  Patient reports fatigue, shortness or breath noticed on arrival.  Patient states he has Asthma, uses Albuterol inhaler as needed. Shortness of breath subsided after patient sat down and rested.     Labs drawn as ordered, patient discharged back to [facility]

## 2018-06-01 ENCOUNTER — NURSE ONLY (OUTPATIENT)
Dept: HEMATOLOGY/ONCOLOGY | Facility: HOSPITAL | Age: 73
End: 2018-06-01
Attending: INTERNAL MEDICINE
Payer: MEDICARE

## 2018-06-01 VITALS
HEART RATE: 86 BPM | RESPIRATION RATE: 16 BRPM | SYSTOLIC BLOOD PRESSURE: 168 MMHG | TEMPERATURE: 98 F | DIASTOLIC BLOOD PRESSURE: 79 MMHG

## 2018-06-01 DIAGNOSIS — D64.9 ANEMIA, UNSPECIFIED: ICD-10-CM

## 2018-06-01 DIAGNOSIS — N18.30 CHRONIC KIDNEY DISEASE, STAGE III (MODERATE) (HCC): Primary | ICD-10-CM

## 2018-06-01 PROCEDURE — 85014 HEMATOCRIT: CPT

## 2018-06-01 PROCEDURE — 36415 COLL VENOUS BLD VENIPUNCTURE: CPT

## 2018-06-01 PROCEDURE — 85018 HEMOGLOBIN: CPT

## 2018-06-01 PROCEDURE — 96372 THER/PROPH/DIAG INJ SC/IM: CPT

## 2018-06-01 NOTE — PROGRESS NOTES
Patient to infusion for labs and possible Aranesp injection. HGB 13.5 - 17.5 g/dL 10.9     HCT 41.0 - 52.0 % 31.9         Patient reports feeling increased fatigue and LIPSCOMB. Patient tolerated injection well, administered per parameters.  Patient verbalizes

## 2018-07-10 PROBLEM — D64.9 ANEMIA, UNSPECIFIED: Status: RESOLVED | Noted: 2018-03-27 | Resolved: 2018-07-10

## 2018-07-10 PROCEDURE — 82570 ASSAY OF URINE CREATININE: CPT | Performed by: INTERNAL MEDICINE

## 2018-07-10 PROCEDURE — 81001 URINALYSIS AUTO W/SCOPE: CPT | Performed by: INTERNAL MEDICINE

## 2018-07-10 PROCEDURE — 82043 UR ALBUMIN QUANTITATIVE: CPT | Performed by: INTERNAL MEDICINE

## 2018-07-13 ENCOUNTER — NURSE ONLY (OUTPATIENT)
Dept: HEMATOLOGY/ONCOLOGY | Facility: HOSPITAL | Age: 73
End: 2018-07-13
Attending: INTERNAL MEDICINE
Payer: MEDICARE

## 2018-07-13 DIAGNOSIS — D64.9 ANEMIA, UNSPECIFIED: ICD-10-CM

## 2018-07-13 DIAGNOSIS — N18.30 CHRONIC KIDNEY DISEASE, STAGE III (MODERATE) (HCC): Primary | ICD-10-CM

## 2018-07-13 LAB
FERRITIN SERPL IA-MCNC: 406 NG/ML (ref 24–336)
HCT VFR BLD AUTO: 32.1 % (ref 41–52)
HGB BLD-MCNC: 10.6 G/DL (ref 13.5–17.5)
IRON SATN MFR SERPL: 36 % (ref 20–55)
IRON SERPL-MCNC: 81 MCG/DL (ref 45–182)
TIBC SERPL-MCNC: 224 MCG/DL (ref 228–428)
TRANSFERRIN SERPL-MCNC: 170 MG/DL (ref 180–329)

## 2018-07-13 PROCEDURE — 96372 THER/PROPH/DIAG INJ SC/IM: CPT

## 2018-07-13 PROCEDURE — 85014 HEMATOCRIT: CPT

## 2018-07-13 PROCEDURE — 83540 ASSAY OF IRON: CPT

## 2018-07-13 PROCEDURE — 36415 COLL VENOUS BLD VENIPUNCTURE: CPT

## 2018-07-13 PROCEDURE — 85018 HEMOGLOBIN: CPT

## 2018-07-13 PROCEDURE — 82728 ASSAY OF FERRITIN: CPT

## 2018-07-13 PROCEDURE — 84466 ASSAY OF TRANSFERRIN: CPT

## 2018-07-13 NOTE — PROGRESS NOTES
Patient arrived to lab gait steady walks slow has some shortness or breath noticed states this is normal has asthma harder in the heat. Patient states he has Asthma, uses Albuterol inhaler as needed.   Shortness of breath subsided after patient sat down an

## 2018-08-24 ENCOUNTER — NURSE ONLY (OUTPATIENT)
Dept: HEMATOLOGY/ONCOLOGY | Facility: HOSPITAL | Age: 73
End: 2018-08-24
Attending: INTERNAL MEDICINE
Payer: MEDICARE

## 2018-08-24 VITALS
SYSTOLIC BLOOD PRESSURE: 159 MMHG | HEART RATE: 75 BPM | DIASTOLIC BLOOD PRESSURE: 74 MMHG | RESPIRATION RATE: 18 BRPM | TEMPERATURE: 98 F

## 2018-08-24 DIAGNOSIS — N18.30 CHRONIC KIDNEY DISEASE, STAGE III (MODERATE) (HCC): Primary | ICD-10-CM

## 2018-08-24 DIAGNOSIS — D64.9 ANEMIA, UNSPECIFIED: ICD-10-CM

## 2018-08-24 LAB
HCT VFR BLD AUTO: 33.3 % (ref 41–52)
HGB BLD-MCNC: 10.9 G/DL (ref 13.5–17.5)

## 2018-08-24 PROCEDURE — 36415 COLL VENOUS BLD VENIPUNCTURE: CPT

## 2018-08-24 PROCEDURE — 85014 HEMATOCRIT: CPT

## 2018-08-24 PROCEDURE — 96372 THER/PROPH/DIAG INJ SC/IM: CPT

## 2018-08-24 PROCEDURE — 85018 HEMOGLOBIN: CPT

## 2018-08-24 NOTE — PROGRESS NOTES
Pt here for Q 6 week h/h and possible aranesp. The iron studies (tibc/iron, ferritin) are Q 12 weeks). He is ambulatory, by himself today. Slater August He did fall 2/9/17 - sustained contusions. Denies feeling weak or dizzy lately. He is fatigued.     Reports mild

## 2018-10-05 ENCOUNTER — APPOINTMENT (OUTPATIENT)
Dept: HEMATOLOGY/ONCOLOGY | Facility: HOSPITAL | Age: 73
End: 2018-10-05
Attending: INTERNAL MEDICINE
Payer: MEDICARE

## 2018-10-10 ENCOUNTER — APPOINTMENT (OUTPATIENT)
Dept: LAB | Facility: HOSPITAL | Age: 73
End: 2018-10-10
Attending: INTERNAL MEDICINE
Payer: MEDICARE

## 2018-10-10 DIAGNOSIS — I10 ESSENTIAL HYPERTENSION, BENIGN: ICD-10-CM

## 2018-10-10 DIAGNOSIS — N18.30 CHRONIC KIDNEY DISEASE, STAGE III (MODERATE) (HCC): ICD-10-CM

## 2018-10-10 DIAGNOSIS — I35.9 AORTIC VALVE DISORDER: ICD-10-CM

## 2018-10-10 DIAGNOSIS — N18.30 ANEMIA OF CHRONIC RENAL FAILURE, STAGE 3 (MODERATE) (HCC): ICD-10-CM

## 2018-10-10 DIAGNOSIS — D63.1 ANEMIA OF CHRONIC RENAL FAILURE, STAGE 3 (MODERATE) (HCC): ICD-10-CM

## 2018-10-10 DIAGNOSIS — D64.9 ANEMIA, UNSPECIFIED TYPE: ICD-10-CM

## 2018-10-10 PROCEDURE — 83540 ASSAY OF IRON: CPT

## 2018-10-10 PROCEDURE — 84466 ASSAY OF TRANSFERRIN: CPT

## 2018-10-10 PROCEDURE — 36415 COLL VENOUS BLD VENIPUNCTURE: CPT

## 2018-10-10 PROCEDURE — 82728 ASSAY OF FERRITIN: CPT

## 2018-10-10 PROCEDURE — 85014 HEMATOCRIT: CPT

## 2018-10-10 PROCEDURE — 85018 HEMOGLOBIN: CPT

## 2018-10-30 PROCEDURE — 81001 URINALYSIS AUTO W/SCOPE: CPT | Performed by: INTERNAL MEDICINE

## 2018-10-31 PROCEDURE — 36415 COLL VENOUS BLD VENIPUNCTURE: CPT | Performed by: INTERNAL MEDICINE

## 2018-10-31 PROCEDURE — 83516 IMMUNOASSAY NONANTIBODY: CPT | Performed by: INTERNAL MEDICINE

## 2018-10-31 PROCEDURE — 86141 C-REACTIVE PROTEIN HS: CPT | Performed by: INTERNAL MEDICINE

## 2018-10-31 PROCEDURE — 86256 FLUORESCENT ANTIBODY TITER: CPT | Performed by: INTERNAL MEDICINE

## 2018-10-31 PROCEDURE — 82784 ASSAY IGA/IGD/IGG/IGM EACH: CPT | Performed by: INTERNAL MEDICINE

## 2018-11-02 PROCEDURE — 82656 EL-1 FECAL QUAL/SEMIQ: CPT | Performed by: INTERNAL MEDICINE

## 2018-11-02 PROCEDURE — 87272 CRYPTOSPORIDIUM AG IF: CPT | Performed by: INTERNAL MEDICINE

## 2018-11-02 PROCEDURE — 87045 FECES CULTURE AEROBIC BACT: CPT | Performed by: INTERNAL MEDICINE

## 2018-11-02 PROCEDURE — 89055 LEUKOCYTE ASSESSMENT FECAL: CPT | Performed by: INTERNAL MEDICINE

## 2018-11-02 PROCEDURE — 87046 STOOL CULTR AEROBIC BACT EA: CPT | Performed by: INTERNAL MEDICINE

## 2018-11-02 PROCEDURE — 83993 ASSAY FOR CALPROTECTIN FECAL: CPT | Performed by: INTERNAL MEDICINE

## 2018-11-02 PROCEDURE — 87329 GIARDIA AG IA: CPT | Performed by: INTERNAL MEDICINE

## 2018-11-07 ENCOUNTER — APPOINTMENT (OUTPATIENT)
Dept: LAB | Facility: HOSPITAL | Age: 73
End: 2018-11-07
Attending: INTERNAL MEDICINE
Payer: MEDICARE

## 2018-11-07 DIAGNOSIS — N18.30 CHRONIC KIDNEY DISEASE, STAGE III (MODERATE) (HCC): ICD-10-CM

## 2018-11-07 DIAGNOSIS — D64.9 ANEMIA, UNSPECIFIED TYPE: ICD-10-CM

## 2018-11-07 PROCEDURE — 85014 HEMATOCRIT: CPT

## 2018-11-07 PROCEDURE — 82728 ASSAY OF FERRITIN: CPT

## 2018-11-07 PROCEDURE — 83540 ASSAY OF IRON: CPT

## 2018-11-07 PROCEDURE — 85018 HEMOGLOBIN: CPT

## 2018-11-07 PROCEDURE — 84466 ASSAY OF TRANSFERRIN: CPT

## 2018-11-07 PROCEDURE — 36415 COLL VENOUS BLD VENIPUNCTURE: CPT

## 2018-12-27 PROBLEM — M54.16 LUMBAR RADICULAR PAIN: Status: ACTIVE | Noted: 2018-12-27

## 2019-01-05 ENCOUNTER — APPOINTMENT (OUTPATIENT)
Dept: LAB | Facility: HOSPITAL | Age: 74
End: 2019-01-05
Attending: INTERNAL MEDICINE
Payer: MEDICARE

## 2019-01-05 DIAGNOSIS — D64.9 ANEMIA, UNSPECIFIED TYPE: ICD-10-CM

## 2019-01-05 DIAGNOSIS — N18.30 CHRONIC KIDNEY DISEASE, STAGE III (MODERATE) (HCC): ICD-10-CM

## 2019-01-05 LAB
FERRITIN SERPL IA-MCNC: 353 NG/ML (ref 24–336)
HCT VFR BLD AUTO: 29.9 % (ref 41–52)
HGB BLD-MCNC: 10 G/DL (ref 13.5–17.5)
IRON SATN MFR SERPL: 28 % (ref 20–55)
IRON SERPL-MCNC: 69 MCG/DL (ref 45–182)
TIBC SERPL-MCNC: 249 MCG/DL (ref 228–428)
TRANSFERRIN SERPL-MCNC: 189 MG/DL (ref 180–329)

## 2019-01-05 PROCEDURE — 84466 ASSAY OF TRANSFERRIN: CPT

## 2019-01-05 PROCEDURE — 83540 ASSAY OF IRON: CPT

## 2019-01-05 PROCEDURE — 36415 COLL VENOUS BLD VENIPUNCTURE: CPT

## 2019-01-05 PROCEDURE — 82728 ASSAY OF FERRITIN: CPT

## 2019-01-05 PROCEDURE — 85018 HEMOGLOBIN: CPT

## 2019-01-05 PROCEDURE — 85014 HEMATOCRIT: CPT

## 2019-02-22 ENCOUNTER — APPOINTMENT (OUTPATIENT)
Dept: LAB | Facility: HOSPITAL | Age: 74
End: 2019-02-22
Attending: INTERNAL MEDICINE
Payer: MEDICARE

## 2019-02-22 DIAGNOSIS — N18.30 CHRONIC KIDNEY DISEASE, STAGE III (MODERATE) (HCC): ICD-10-CM

## 2019-02-22 DIAGNOSIS — D64.9 ANEMIA, UNSPECIFIED TYPE: ICD-10-CM

## 2019-02-22 LAB
DEPRECATED HBV CORE AB SER IA-ACNC: 395.5 NG/ML (ref 30–530)
HCT VFR BLD AUTO: 32.7 % (ref 39–53)
HGB BLD-MCNC: 10.7 G/DL (ref 13–17.5)
IRON SATURATION: 21 % (ref 20–50)
IRON SERPL-MCNC: 59 UG/DL (ref 65–175)
TOTAL IRON BINDING CAPACITY: 277 UG/DL (ref 240–450)
TRANSFERRIN SERPL-MCNC: 186 MG/DL (ref 200–360)

## 2019-02-22 PROCEDURE — 84466 ASSAY OF TRANSFERRIN: CPT

## 2019-02-22 PROCEDURE — 82728 ASSAY OF FERRITIN: CPT

## 2019-02-22 PROCEDURE — 85018 HEMOGLOBIN: CPT

## 2019-02-22 PROCEDURE — 85014 HEMATOCRIT: CPT

## 2019-02-22 PROCEDURE — 83540 ASSAY OF IRON: CPT

## 2019-02-22 PROCEDURE — 36415 COLL VENOUS BLD VENIPUNCTURE: CPT

## 2019-03-29 PROBLEM — M54.16 LUMBAR RADICULAR PAIN: Status: RESOLVED | Noted: 2018-12-27 | Resolved: 2019-03-29

## 2019-03-29 PROCEDURE — 81001 URINALYSIS AUTO W/SCOPE: CPT | Performed by: INTERNAL MEDICINE

## 2019-05-10 ENCOUNTER — APPOINTMENT (OUTPATIENT)
Dept: LAB | Facility: HOSPITAL | Age: 74
End: 2019-05-10
Attending: INTERNAL MEDICINE
Payer: MEDICARE

## 2019-05-10 DIAGNOSIS — N18.30 CHRONIC KIDNEY DISEASE, STAGE III (MODERATE) (HCC): ICD-10-CM

## 2019-05-10 PROCEDURE — 85014 HEMATOCRIT: CPT

## 2019-05-10 PROCEDURE — 36415 COLL VENOUS BLD VENIPUNCTURE: CPT

## 2019-05-10 PROCEDURE — 85018 HEMOGLOBIN: CPT

## 2019-05-22 PROCEDURE — 86334 IMMUNOFIX E-PHORESIS SERUM: CPT | Performed by: INTERNAL MEDICINE

## 2019-05-22 PROCEDURE — 83883 ASSAY NEPHELOMETRY NOT SPEC: CPT | Performed by: INTERNAL MEDICINE

## 2019-05-22 PROCEDURE — 82784 ASSAY IGA/IGD/IGG/IGM EACH: CPT | Performed by: INTERNAL MEDICINE

## 2019-05-22 PROCEDURE — 84165 PROTEIN E-PHORESIS SERUM: CPT | Performed by: INTERNAL MEDICINE

## 2019-05-24 PROCEDURE — 86335 IMMUNFIX E-PHORSIS/URINE/CSF: CPT | Performed by: INTERNAL MEDICINE

## 2019-05-24 PROCEDURE — 84156 ASSAY OF PROTEIN URINE: CPT | Performed by: INTERNAL MEDICINE

## 2019-05-24 PROCEDURE — 83883 ASSAY NEPHELOMETRY NOT SPEC: CPT | Performed by: INTERNAL MEDICINE

## 2019-05-24 PROCEDURE — 36415 COLL VENOUS BLD VENIPUNCTURE: CPT | Performed by: INTERNAL MEDICINE

## 2019-06-12 ENCOUNTER — APPOINTMENT (OUTPATIENT)
Dept: LAB | Facility: HOSPITAL | Age: 74
End: 2019-06-12
Attending: INTERNAL MEDICINE
Payer: MEDICARE

## 2019-06-12 DIAGNOSIS — N18.30 CHRONIC KIDNEY DISEASE, STAGE III (MODERATE) (HCC): ICD-10-CM

## 2019-06-12 PROCEDURE — 36415 COLL VENOUS BLD VENIPUNCTURE: CPT

## 2019-06-12 PROCEDURE — 80069 RENAL FUNCTION PANEL: CPT

## 2019-06-12 PROCEDURE — 85018 HEMOGLOBIN: CPT

## 2019-06-12 PROCEDURE — 85014 HEMATOCRIT: CPT

## 2019-06-21 ENCOUNTER — OFFICE VISIT (OUTPATIENT)
Dept: ENDOCRINOLOGY CLINIC | Facility: CLINIC | Age: 74
End: 2019-06-21
Payer: MEDICARE

## 2019-06-21 ENCOUNTER — NURSE ONLY (OUTPATIENT)
Dept: HEMATOLOGY/ONCOLOGY | Facility: HOSPITAL | Age: 74
End: 2019-06-21
Attending: INTERNAL MEDICINE
Payer: MEDICARE

## 2019-06-21 VITALS
SYSTOLIC BLOOD PRESSURE: 157 MMHG | HEART RATE: 82 BPM | WEIGHT: 189.13 LBS | BODY MASS INDEX: 31 KG/M2 | DIASTOLIC BLOOD PRESSURE: 68 MMHG

## 2019-06-21 DIAGNOSIS — N18.30 CHRONIC KIDNEY DISEASE, STAGE III (MODERATE) (HCC): ICD-10-CM

## 2019-06-21 DIAGNOSIS — N18.4 TYPE 2 DIABETES MELLITUS WITH STAGE 4 CHRONIC KIDNEY DISEASE, WITH LONG-TERM CURRENT USE OF INSULIN (HCC): Primary | ICD-10-CM

## 2019-06-21 DIAGNOSIS — E11.22 TYPE 2 DIABETES MELLITUS WITH STAGE 4 CHRONIC KIDNEY DISEASE, WITH LONG-TERM CURRENT USE OF INSULIN (HCC): Primary | ICD-10-CM

## 2019-06-21 DIAGNOSIS — Z79.4 TYPE 2 DIABETES MELLITUS WITH STAGE 4 CHRONIC KIDNEY DISEASE, WITH LONG-TERM CURRENT USE OF INSULIN (HCC): Primary | ICD-10-CM

## 2019-06-21 DIAGNOSIS — N18.30 CHRONIC KIDNEY DISEASE, STAGE III (MODERATE) (HCC): Primary | ICD-10-CM

## 2019-06-21 DIAGNOSIS — E11.21 DIABETIC NEPHROPATHY ASSOCIATED WITH TYPE 2 DIABETES MELLITUS (HCC): ICD-10-CM

## 2019-06-21 PROCEDURE — 96372 THER/PROPH/DIAG INJ SC/IM: CPT

## 2019-06-21 PROCEDURE — 83540 ASSAY OF IRON: CPT | Performed by: INTERNAL MEDICINE

## 2019-06-21 PROCEDURE — 82728 ASSAY OF FERRITIN: CPT | Performed by: INTERNAL MEDICINE

## 2019-06-21 PROCEDURE — 99205 OFFICE O/P NEW HI 60 MIN: CPT | Performed by: NURSE PRACTITIONER

## 2019-06-21 PROCEDURE — 84466 ASSAY OF TRANSFERRIN: CPT | Performed by: INTERNAL MEDICINE

## 2019-06-21 PROCEDURE — 85014 HEMATOCRIT: CPT

## 2019-06-21 PROCEDURE — 36415 COLL VENOUS BLD VENIPUNCTURE: CPT

## 2019-06-21 PROCEDURE — 80069 RENAL FUNCTION PANEL: CPT | Performed by: INTERNAL MEDICINE

## 2019-06-21 PROCEDURE — 85018 HEMOGLOBIN: CPT

## 2019-06-21 RX ORDER — PIOGLITAZONEHYDROCHLORIDE 30 MG/1
30 TABLET ORAL DAILY
Qty: 30 TABLET | Refills: 2 | Status: SHIPPED | OUTPATIENT
Start: 2019-06-21 | End: 2019-07-21

## 2019-06-21 NOTE — PROGRESS NOTES
Patient arrives for labs and possible aranesp. Labs collected from [facility], site covered with 2x2 and coban. Patient to infusion for Aranesp injection.   Lab Results   Component Value Date    HGB 9.4 (L) 06/21/2019    HCT 30.1 (L) 06/21/2019           Ar

## 2019-06-21 NOTE — PROGRESS NOTES
Diabetes Consult    Name: Gerardo Pierson  Date: 6/21/2019    Referring Physician: Jordan Ralph.     HISTORY OF PRESENT ILLNESS   Gerardo Pierson is a 69year old male who presents for diabetes evaluation    Significant other medical history  In Infection or ulceration: No            Follows with Podiatry: Yes    Feet  DeniesHistory of ulceration, amputation, Charcot foot, angioplasty or vascular surgery +, neuropathy (pain, burning, numbness) in feet  .      Osteoporosis: No    Thyroid disease: No each, Rfl: 1  •  LEVEMIR FLEXTOUCH 100 UNIT/ML Subcutaneous Solution Pen-injector, INJECT 40 UNITS INTO THE SKIN NIGHTLY., Disp: 45 mL, Rfl: 3  •  WESTON MICROLET LANCETS Does not apply Misc, Test 3 times daily, Disp: 300 each, Rfl: 3  •  Glucose Blood (CON (ZYRTEC) 10 MG Oral Tab, Take 10 mg by mouth daily. , Disp: , Rfl:   •  PATANOL 0.1 % Ophthalmic Solution, , Disp: , Rfl: 3  •  Darbepoetin Randell (ARANESP, ALBUMIN FREE,) 60 MCG/ML Injection Solution, Inject into the vein as needed.   , Disp: , Rfl:   •  Margot midline: Normal  Back: no kyphosis or back tenderness  Respiratory:  clear to auscultation bilaterally  Cardiovascular:  regular rate, rhythm, , no murmurs, S3 or S4  Gastrointestinal:  normal bowel sounds and no palpable masses in abdomen, organomegaly or Depression in Diabetic patient population      Diabetic Medications   Discontinue Metformin     Levemir 40 units SC in am   Metformin 850 po bid - discontinue due to renal function    Glyburide 3 mg po po bid     Start Actos 30 mg po daily (normal EF)    t Results   Component Value Date    TRIG 114.00 03/29/2019    TRIG 108 06/15/2017    TRIG 119 09/13/2016     Lab Results   Component Value Date     (H) 03/29/2019     (H) 06/15/2017    .2 (H) 09/13/2016     Lab Results   Component Value

## 2019-06-21 NOTE — PATIENT INSTRUCTIONS
Medications for DM   Levemir 40 units SC in am    Metformin 850 po bid - discontinue   Glyburide 3 mg po po bid     Start Actos 30 mg po daily     Follow up in one month

## 2019-06-24 PROBLEM — D63.1 ANEMIA OF CHRONIC KIDNEY FAILURE: Status: ACTIVE | Noted: 2019-06-24

## 2019-06-24 PROBLEM — N18.9 ANEMIA OF CHRONIC KIDNEY FAILURE: Status: ACTIVE | Noted: 2019-06-24

## 2019-07-09 ENCOUNTER — TELEPHONE (OUTPATIENT)
Dept: HEMATOLOGY/ONCOLOGY | Facility: HOSPITAL | Age: 74
End: 2019-07-09

## 2019-07-20 ENCOUNTER — APPOINTMENT (OUTPATIENT)
Dept: CT IMAGING | Facility: HOSPITAL | Age: 74
End: 2019-07-20
Attending: EMERGENCY MEDICINE
Payer: COMMERCIAL

## 2019-07-20 ENCOUNTER — HOSPITAL ENCOUNTER (EMERGENCY)
Facility: HOSPITAL | Age: 74
Discharge: HOME | End: 2019-07-20
Attending: EMERGENCY MEDICINE
Payer: COMMERCIAL

## 2019-07-20 VITALS
DIASTOLIC BLOOD PRESSURE: 70 MMHG | TEMPERATURE: 98 F | RESPIRATION RATE: 18 BRPM | OXYGEN SATURATION: 100 % | HEART RATE: 84 BPM | SYSTOLIC BLOOD PRESSURE: 149 MMHG

## 2019-07-20 DIAGNOSIS — S13.9XXA NECK SPRAIN, INITIAL ENCOUNTER: ICD-10-CM

## 2019-07-20 DIAGNOSIS — E86.0 DEHYDRATION: ICD-10-CM
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
## 2019-07-20 LAB
ANION GAP SERPL CALC-SCNC: 8 MMOL/L (ref 0–18)
BASOPHILS # BLD AUTO: 0.05 X10(3) UL (ref 0–0.2)
BASOPHILS NFR BLD AUTO: 0.8 %
BUN BLD-MCNC: 64 MG/DL (ref 7–18)
BUN/CREAT SERPL: 22.2 (ref 10–20)
CALCIUM BLD-MCNC: 9.3 MG/DL (ref 8.5–10.1)
CHLORIDE SERPL-SCNC: 112 MMOL/L (ref 98–112)
CO2 SERPL-SCNC: 24 MMOL/L (ref 21–32)
CREAT BLD-MCNC: 2.88 MG/DL (ref 0.7–1.3)
DEPRECATED RDW RBC AUTO: 47.6 FL (ref 35.1–46.3)
EOSINOPHIL # BLD AUTO: 0.23 X10(3) UL (ref 0–0.7)
EOSINOPHIL NFR BLD AUTO: 3.5 %
ERYTHROCYTE [DISTWIDTH] IN BLOOD BY AUTOMATED COUNT: 14 % (ref 11–15)
GLUCOSE BLD-MCNC: 98 MG/DL (ref 70–99)
HCT VFR BLD AUTO: 32.5 % (ref 39–53)
HGB BLD-MCNC: 10.1 G/DL (ref 13–17.5)
IMM GRANULOCYTES # BLD AUTO: 0.09 X10(3) UL (ref 0–1)
IMM GRANULOCYTES NFR BLD: 1.4 %
LYMPHOCYTES # BLD AUTO: 1.17 X10(3) UL (ref 1–4)
LYMPHOCYTES NFR BLD AUTO: 17.8 %
MCH RBC QN AUTO: 29.4 PG (ref 26–34)
MCHC RBC AUTO-ENTMCNC: 31.1 G/DL (ref 31–37)
MCV RBC AUTO: 94.5 FL (ref 80–100)
MONOCYTES # BLD AUTO: 0.43 X10(3) UL (ref 0.1–1)
MONOCYTES NFR BLD AUTO: 6.5 %
NEUTROPHILS # BLD AUTO: 4.62 X10 (3) UL (ref 1.5–7.7)
NEUTROPHILS # BLD AUTO: 4.62 X10(3) UL (ref 1.5–7.7)
NEUTROPHILS NFR BLD AUTO: 70 %
OSMOLALITY SERPL CALC.SUM OF ELEC: 316 MOSM/KG (ref 275–295)
PLATELET # BLD AUTO: 242 10(3)UL (ref 150–450)
POTASSIUM SERPL-SCNC: 5 MMOL/L (ref 3.5–5.1)
RBC # BLD AUTO: 3.44 X10(6)UL (ref 3.8–5.8)
SODIUM SERPL-SCNC: 144 MMOL/L (ref 136–145)
TROPONIN I SERPL-MCNC: <0.045 NG/ML (ref ?–0.04)
WBC # BLD AUTO: 6.6 X10(3) UL (ref 4–11)

## 2019-07-20 PROCEDURE — 93010 ELECTROCARDIOGRAM REPORT: CPT | Performed by: EMERGENCY MEDICINE

## 2019-07-20 PROCEDURE — 80048 BASIC METABOLIC PNL TOTAL CA: CPT | Performed by: EMERGENCY MEDICINE

## 2019-07-20 PROCEDURE — 99285 EMERGENCY DEPT VISIT HI MDM: CPT

## 2019-07-20 PROCEDURE — 72125 CT NECK SPINE W/O DYE: CPT | Performed by: EMERGENCY MEDICINE

## 2019-07-20 PROCEDURE — 96360 HYDRATION IV INFUSION INIT: CPT

## 2019-07-20 PROCEDURE — 96361 HYDRATE IV INFUSION ADD-ON: CPT

## 2019-07-20 PROCEDURE — 84484 ASSAY OF TROPONIN QUANT: CPT | Performed by: EMERGENCY MEDICINE

## 2019-07-20 PROCEDURE — 70450 CT HEAD/BRAIN W/O DYE: CPT | Performed by: EMERGENCY MEDICINE

## 2019-07-20 PROCEDURE — 85025 COMPLETE CBC W/AUTO DIFF WBC: CPT | Performed by: EMERGENCY MEDICINE

## 2019-07-20 PROCEDURE — 93005 ELECTROCARDIOGRAM TRACING: CPT

## 2019-07-20 RX ORDER — IBUPROFEN 600 MG/1
600 TABLET ORAL EVERY 8 HOURS PRN
Qty: 20 TABLET | Refills: 0 | Status: SHIPPED | OUTPATIENT
Start: 2019-07-20 | End: 2019-07-20
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
## 2019-07-25 ENCOUNTER — APPOINTMENT (OUTPATIENT)
Dept: CT IMAGING | Facility: HOSPITAL | Age: 74
End: 2019-07-25
Attending: EMERGENCY MEDICINE
Payer: COMMERCIAL

## 2019-07-25 ENCOUNTER — HOSPITAL ENCOUNTER (EMERGENCY)
Facility: HOSPITAL | Age: 74
Discharge: HOME | End: 2019-07-25
Attending: EMERGENCY MEDICINE
Payer: COMMERCIAL

## 2019-07-25 ENCOUNTER — APPOINTMENT (OUTPATIENT)
Dept: GENERAL RADIOLOGY | Facility: HOSPITAL | Age: 74
End: 2019-07-25
Attending: EMERGENCY MEDICINE
Payer: COMMERCIAL

## 2019-07-25 VITALS
RESPIRATION RATE: 20 BRPM | OXYGEN SATURATION: 100 % | HEART RATE: 88 BPM | HEIGHT: 65 IN | WEIGHT: 181 LBS | SYSTOLIC BLOOD PRESSURE: 188 MMHG | DIASTOLIC BLOOD PRESSURE: 81 MMHG | BODY MASS INDEX: 30.16 KG/M2 | TEMPERATURE: 98 F

## 2019-07-25 DIAGNOSIS — N18.30 CHRONIC KIDNEY DISEASE, STAGE III (MODERATE) (HCC): ICD-10-CM

## 2019-07-25 DIAGNOSIS — R53.1 WEAKNESS GENERALIZED: Primary | ICD-10-CM

## 2019-07-25 DIAGNOSIS — E87.5 HYPERKALEMIA: ICD-10-CM

## 2019-07-25 LAB
ANION GAP SERPL CALC-SCNC: 9 MMOL/L (ref 0–18)
BACTERIA UR QL AUTO: NEGATIVE /HPF
BASOPHILS # BLD AUTO: 0.05 X10(3) UL (ref 0–0.2)
BASOPHILS NFR BLD AUTO: 0.7 %
BILIRUB UR QL: NEGATIVE
BUN BLD-MCNC: 69 MG/DL (ref 7–18)
BUN/CREAT SERPL: 23.1 (ref 10–20)
CALCIUM BLD-MCNC: 9.4 MG/DL (ref 8.5–10.1)
CHLORIDE SERPL-SCNC: 109 MMOL/L (ref 98–112)
CLARITY UR: CLEAR
CO2 SERPL-SCNC: 23 MMOL/L (ref 21–32)
COLOR UR: YELLOW
CREAT BLD-MCNC: 2.99 MG/DL (ref 0.7–1.3)
DEPRECATED RDW RBC AUTO: 47.1 FL (ref 35.1–46.3)
EOSINOPHIL # BLD AUTO: 0.3 X10(3) UL (ref 0–0.7)
EOSINOPHIL NFR BLD AUTO: 4.1 %
ERYTHROCYTE [DISTWIDTH] IN BLOOD BY AUTOMATED COUNT: 13.7 % (ref 11–15)
GLUCOSE BLD-MCNC: 131 MG/DL (ref 70–99)
GLUCOSE UR-MCNC: NEGATIVE MG/DL
HCT VFR BLD AUTO: 32.4 % (ref 39–53)
HGB BLD-MCNC: 10.2 G/DL (ref 13–17.5)
IMM GRANULOCYTES # BLD AUTO: 0.04 X10(3) UL (ref 0–1)
IMM GRANULOCYTES NFR BLD: 0.5 %
KETONES UR-MCNC: NEGATIVE MG/DL
LEUKOCYTE ESTERASE UR QL STRIP.AUTO: NEGATIVE
LYMPHOCYTES # BLD AUTO: 1.7 X10(3) UL (ref 1–4)
LYMPHOCYTES NFR BLD AUTO: 23 %
MCH RBC QN AUTO: 29.2 PG (ref 26–34)
MCHC RBC AUTO-ENTMCNC: 31.5 G/DL (ref 31–37)
MCV RBC AUTO: 92.8 FL (ref 80–100)
MONOCYTES # BLD AUTO: 0.57 X10(3) UL (ref 0.1–1)
MONOCYTES NFR BLD AUTO: 7.7 %
NEUTROPHILS # BLD AUTO: 4.74 X10 (3) UL (ref 1.5–7.7)
NEUTROPHILS # BLD AUTO: 4.74 X10(3) UL (ref 1.5–7.7)
NEUTROPHILS NFR BLD AUTO: 64 %
NITRITE UR QL STRIP.AUTO: NEGATIVE
NT-PROBNP SERPL-MCNC: 332 PG/ML (ref ?–125)
OSMOLALITY SERPL CALC.SUM OF ELEC: 314 MOSM/KG (ref 275–295)
PH UR: 5 [PH] (ref 5–8)
PLATELET # BLD AUTO: 234 10(3)UL (ref 150–450)
POTASSIUM SERPL-SCNC: 5.7 MMOL/L (ref 3.5–5.1)
PROT UR-MCNC: 100 MG/DL
RBC # BLD AUTO: 3.49 X10(6)UL (ref 3.8–5.8)
RBC #/AREA URNS AUTO: 2 /HPF
SODIUM SERPL-SCNC: 141 MMOL/L (ref 136–145)
SP GR UR STRIP: 1.01 (ref 1–1.03)
TROPONIN I SERPL-MCNC: <0.045 NG/ML (ref ?–0.04)
UROBILINOGEN UR STRIP-ACNC: <2
VIT C UR-MCNC: NEGATIVE MG/DL
WBC # BLD AUTO: 7.4 X10(3) UL (ref 4–11)
WBC #/AREA URNS AUTO: <1 /HPF

## 2019-07-25 PROCEDURE — 71045 X-RAY EXAM CHEST 1 VIEW: CPT | Performed by: EMERGENCY MEDICINE

## 2019-07-25 PROCEDURE — 81001 URINALYSIS AUTO W/SCOPE: CPT | Performed by: EMERGENCY MEDICINE

## 2019-07-25 PROCEDURE — 85025 COMPLETE CBC W/AUTO DIFF WBC: CPT | Performed by: EMERGENCY MEDICINE

## 2019-07-25 PROCEDURE — 36415 COLL VENOUS BLD VENIPUNCTURE: CPT

## 2019-07-25 PROCEDURE — 80048 BASIC METABOLIC PNL TOTAL CA: CPT | Performed by: EMERGENCY MEDICINE

## 2019-07-25 PROCEDURE — 83880 ASSAY OF NATRIURETIC PEPTIDE: CPT

## 2019-07-25 PROCEDURE — 99284 EMERGENCY DEPT VISIT MOD MDM: CPT

## 2019-07-25 PROCEDURE — 84484 ASSAY OF TROPONIN QUANT: CPT

## 2019-07-25 PROCEDURE — 85025 COMPLETE CBC W/AUTO DIFF WBC: CPT

## 2019-07-25 PROCEDURE — 82272 OCCULT BLD FECES 1-3 TESTS: CPT

## 2019-07-25 PROCEDURE — 84484 ASSAY OF TROPONIN QUANT: CPT | Performed by: EMERGENCY MEDICINE

## 2019-07-25 PROCEDURE — 93005 ELECTROCARDIOGRAM TRACING: CPT

## 2019-07-25 PROCEDURE — 80048 BASIC METABOLIC PNL TOTAL CA: CPT

## 2019-07-25 PROCEDURE — 83880 ASSAY OF NATRIURETIC PEPTIDE: CPT | Performed by: EMERGENCY MEDICINE

## 2019-07-25 PROCEDURE — 93010 ELECTROCARDIOGRAM REPORT: CPT | Performed by: EMERGENCY MEDICINE

## 2019-07-25 PROCEDURE — 70450 CT HEAD/BRAIN W/O DYE: CPT | Performed by: EMERGENCY MEDICINE

## 2019-07-25 NOTE — ED PROVIDER NOTES
Patient Seen in: [facility] AND [facility] Emergency Department    History   Patient presents with:  Fatigue (constitutional, neurologic)    Stated Complaint: weakness, anemic    HPI    74-year-old male with history of hypertension, diabetes, hyperlipidemia, ane above.    Physical Exam     ED Triage Vitals [07/25/19 0742]   BP (!) 191/78   Pulse 93   Resp 20   Temp 97.9 °F (36.6 °C)   Temp src    SpO2 99 %   O2 Device None (Room air)       Current:BP (!) 188/81   Pulse 88   Temp 97.9 °F (36.6 °C)   Resp 20   Ht 16 HCT 32.4 (*)     RDW-SD 47.1 (*)     All other components within normal limits   TROPONIN I - Normal   CBC WITH DIFFERENTIAL WITH PLATELET    Narrative: The following orders were created for panel order CBC WITH DIFFERENTIAL WITH PLATELET.   Procedure

## 2019-07-25 NOTE — ED INITIAL ASSESSMENT (HPI)
Pt presents with many various complaints ranging from years ago to today. Today pt states he is weak and SOB. Denies CP or cough.

## 2019-07-29 ENCOUNTER — NURSE ONLY (OUTPATIENT)
Dept: HEMATOLOGY/ONCOLOGY | Facility: HOSPITAL | Age: 74
End: 2019-07-29
Attending: INTERNAL MEDICINE
Payer: MEDICARE

## 2019-07-29 VITALS
SYSTOLIC BLOOD PRESSURE: 173 MMHG | HEART RATE: 79 BPM | RESPIRATION RATE: 18 BRPM | TEMPERATURE: 98 F | DIASTOLIC BLOOD PRESSURE: 66 MMHG

## 2019-07-29 DIAGNOSIS — N18.30 CHRONIC KIDNEY DISEASE, STAGE III (MODERATE) (HCC): Primary | ICD-10-CM

## 2019-07-29 PROCEDURE — 96372 THER/PROPH/DIAG INJ SC/IM: CPT

## 2019-07-30 PROBLEM — N18.9 ANEMIA OF CHRONIC KIDNEY FAILURE: Status: RESOLVED | Noted: 2019-06-24 | Resolved: 2019-07-30

## 2019-07-30 PROBLEM — D63.1 ANEMIA OF CHRONIC KIDNEY FAILURE: Status: RESOLVED | Noted: 2019-06-24 | Resolved: 2019-07-30

## 2019-08-01 ENCOUNTER — OFFICE VISIT (OUTPATIENT)
Dept: HEMATOLOGY/ONCOLOGY | Facility: HOSPITAL | Age: 74
End: 2019-08-01
Attending: INTERNAL MEDICINE
Payer: MEDICARE

## 2019-08-01 VITALS
SYSTOLIC BLOOD PRESSURE: 152 MMHG | DIASTOLIC BLOOD PRESSURE: 58 MMHG | RESPIRATION RATE: 18 BRPM | OXYGEN SATURATION: 99 % | TEMPERATURE: 98 F | HEART RATE: 78 BPM

## 2019-08-01 DIAGNOSIS — N18.30 CHRONIC KIDNEY DISEASE, STAGE III (MODERATE) (HCC): Primary | ICD-10-CM

## 2019-08-01 DIAGNOSIS — N18.9 ANEMIA OF CHRONIC KIDNEY FAILURE: ICD-10-CM

## 2019-08-01 DIAGNOSIS — D63.1 ANEMIA OF CHRONIC KIDNEY FAILURE: ICD-10-CM

## 2019-08-01 PROCEDURE — 96374 THER/PROPH/DIAG INJ IV PUSH: CPT

## 2019-08-01 NOTE — PROGRESS NOTES
Pt to infusion for Venofer 1/5. Labs from 6/21/19   Pt reports some fatigue and SOB, especially with exertion. Jhonatan Koch received Venofer previously and tolerated well.   He states he did get dizzy when he returned home after last Venofer injection and prefers

## 2019-08-05 ENCOUNTER — OFFICE VISIT (OUTPATIENT)
Dept: HEMATOLOGY/ONCOLOGY | Facility: HOSPITAL | Age: 74
End: 2019-08-05
Attending: INTERNAL MEDICINE
Payer: MEDICARE

## 2019-08-05 VITALS
RESPIRATION RATE: 16 BRPM | HEART RATE: 86 BPM | TEMPERATURE: 99 F | DIASTOLIC BLOOD PRESSURE: 61 MMHG | SYSTOLIC BLOOD PRESSURE: 178 MMHG | OXYGEN SATURATION: 99 %

## 2019-08-05 DIAGNOSIS — D63.1 ANEMIA OF CHRONIC KIDNEY FAILURE: ICD-10-CM

## 2019-08-05 DIAGNOSIS — N18.9 ANEMIA OF CHRONIC KIDNEY FAILURE: ICD-10-CM

## 2019-08-05 DIAGNOSIS — N18.30 CHRONIC KIDNEY DISEASE, STAGE III (MODERATE) (HCC): Primary | ICD-10-CM

## 2019-08-05 PROCEDURE — 96374 THER/PROPH/DIAG INJ IV PUSH: CPT

## 2019-08-05 NOTE — PROGRESS NOTES
Pt to infusion for Venofer 2/5. Pt reports some fatigue and SOB, especially with exertion. Stew Conner received Venofer previously and tolerated well. PIV started in right forearm . Venofer infusion given over 5 minutes without any adverse reaction noted.

## 2019-08-07 ENCOUNTER — OFFICE VISIT (OUTPATIENT)
Dept: HEMATOLOGY/ONCOLOGY | Facility: HOSPITAL | Age: 74
End: 2019-08-07
Attending: INTERNAL MEDICINE
Payer: MEDICARE

## 2019-08-07 VITALS
SYSTOLIC BLOOD PRESSURE: 147 MMHG | HEART RATE: 80 BPM | DIASTOLIC BLOOD PRESSURE: 60 MMHG | RESPIRATION RATE: 16 BRPM | TEMPERATURE: 98 F

## 2019-08-07 DIAGNOSIS — N18.30 CHRONIC KIDNEY DISEASE, STAGE III (MODERATE) (HCC): Primary | ICD-10-CM

## 2019-08-07 DIAGNOSIS — D63.1 ANEMIA OF CHRONIC KIDNEY FAILURE: ICD-10-CM

## 2019-08-07 DIAGNOSIS — N18.9 ANEMIA OF CHRONIC KIDNEY FAILURE: ICD-10-CM

## 2019-08-07 PROCEDURE — 96374 THER/PROPH/DIAG INJ IV PUSH: CPT

## 2019-08-07 NOTE — PROGRESS NOTES
Pt to infusion for Venofer 3/5. Pt reports some fatigue and SOB, especially with exertion. Venofer previously and tolerated well. PIV started in right hand . Venofer infusion given over 10 minutes without any adverse reaction noted.    VS stable, patient

## 2019-08-09 ENCOUNTER — OFFICE VISIT (OUTPATIENT)
Dept: HEMATOLOGY/ONCOLOGY | Facility: HOSPITAL | Age: 74
End: 2019-08-09
Attending: INTERNAL MEDICINE
Payer: MEDICARE

## 2019-08-09 VITALS
RESPIRATION RATE: 18 BRPM | HEART RATE: 77 BPM | DIASTOLIC BLOOD PRESSURE: 55 MMHG | SYSTOLIC BLOOD PRESSURE: 138 MMHG | OXYGEN SATURATION: 100 % | TEMPERATURE: 99 F

## 2019-08-09 DIAGNOSIS — N18.9 ANEMIA OF CHRONIC KIDNEY FAILURE: ICD-10-CM

## 2019-08-09 DIAGNOSIS — N18.30 CHRONIC KIDNEY DISEASE, STAGE III (MODERATE) (HCC): Primary | ICD-10-CM

## 2019-08-09 DIAGNOSIS — D63.1 ANEMIA OF CHRONIC KIDNEY FAILURE: ICD-10-CM

## 2019-08-09 PROCEDURE — 96374 THER/PROPH/DIAG INJ IV PUSH: CPT

## 2019-08-09 NOTE — PROGRESS NOTES
Pt to infusion for Venofer 4/5. Pt reports some fatigue and SOB, especially with exertion. Venofer previously and tolerated well. PIV started in right hand . Venofer infusion given slow IVP without any adverse reaction noted.    VS stable, patient offers

## 2019-08-12 ENCOUNTER — OFFICE VISIT (OUTPATIENT)
Dept: HEMATOLOGY/ONCOLOGY | Facility: HOSPITAL | Age: 74
End: 2019-08-12
Attending: INTERNAL MEDICINE
Payer: MEDICARE

## 2019-08-12 ENCOUNTER — TELEPHONE (OUTPATIENT)
Dept: ENDOCRINOLOGY CLINIC | Facility: CLINIC | Age: 74
End: 2019-08-12

## 2019-08-12 VITALS
OXYGEN SATURATION: 96 % | TEMPERATURE: 99 F | DIASTOLIC BLOOD PRESSURE: 60 MMHG | RESPIRATION RATE: 16 BRPM | HEART RATE: 92 BPM | SYSTOLIC BLOOD PRESSURE: 144 MMHG

## 2019-08-12 DIAGNOSIS — D63.1 ANEMIA OF CHRONIC KIDNEY FAILURE: ICD-10-CM

## 2019-08-12 DIAGNOSIS — N18.30 CHRONIC KIDNEY DISEASE, STAGE III (MODERATE) (HCC): Primary | ICD-10-CM

## 2019-08-12 DIAGNOSIS — N18.9 ANEMIA OF CHRONIC KIDNEY FAILURE: ICD-10-CM

## 2019-08-12 PROCEDURE — 96374 THER/PROPH/DIAG INJ IV PUSH: CPT

## 2019-08-12 NOTE — TELEPHONE ENCOUNTER
Routed to [facility] for review tomorrow. Also routed to papito DILLON to see if she would like to make adjustment to medications for tonight. Background: LOV consult with [facility] on 6/21/19 for DM2. Hx of CKD and diabetic nephropathy.  Takes steroids every ot

## 2019-08-12 NOTE — TELEPHONE ENCOUNTER
Left detailed message (ok per [facility] release) with medication instructions as written below but also asked that the patient call us back to confirm he's received message.

## 2019-08-12 NOTE — PROGRESS NOTES
Pt to infusion for Venofer 5/5. Pt reports some fatigue and SOB, especially with exertion. Venofer previously and tolerated well. PIV started in right hand . Venofer infusion given slow IVP, alternating with 0.9ns, blood return throughout.   Pt tolerated

## 2019-08-12 NOTE — TELEPHONE ENCOUNTER
Levemir 40 --> 35 units SC in am   Metformin 850 po bid - discontinue due to renal function    Glyburide 3 mg po po bid --> 3 mg in am only   Actos 30 mg po daily (normal EF)    Please book FU with me ( okay to overbook on my [place] wed) or Afshin Cease in th

## 2019-08-12 NOTE — TELEPHONE ENCOUNTER
Pt would like to spk w APN or rn regarding continuous with medications or stopping, pt indicates his BS are acting up, pt does not wish to leave bs readings through TE. Pls call at:[phone],ok to fabiola whitlock,thanks.

## 2019-08-13 ENCOUNTER — OFFICE VISIT (OUTPATIENT)
Dept: ENDOCRINOLOGY CLINIC | Facility: CLINIC | Age: 74
End: 2019-08-13
Payer: MEDICARE

## 2019-08-13 VITALS
SYSTOLIC BLOOD PRESSURE: 151 MMHG | DIASTOLIC BLOOD PRESSURE: 76 MMHG | HEART RATE: 78 BPM | BODY MASS INDEX: 31 KG/M2 | WEIGHT: 188 LBS

## 2019-08-13 DIAGNOSIS — N18.4 TYPE 2 DIABETES MELLITUS WITH STAGE 4 CHRONIC KIDNEY DISEASE, WITH LONG-TERM CURRENT USE OF INSULIN (HCC): Primary | ICD-10-CM

## 2019-08-13 DIAGNOSIS — E11.22 TYPE 2 DIABETES MELLITUS WITH STAGE 4 CHRONIC KIDNEY DISEASE, WITH LONG-TERM CURRENT USE OF INSULIN (HCC): Primary | ICD-10-CM

## 2019-08-13 DIAGNOSIS — Z79.4 TYPE 2 DIABETES MELLITUS WITH STAGE 4 CHRONIC KIDNEY DISEASE, WITH LONG-TERM CURRENT USE OF INSULIN (HCC): Primary | ICD-10-CM

## 2019-08-13 DIAGNOSIS — E11.21 DIABETIC NEPHROPATHY ASSOCIATED WITH TYPE 2 DIABETES MELLITUS (HCC): ICD-10-CM

## 2019-08-13 LAB
CARTRIDGE LOT#: ABNORMAL NUMERIC
GLUCOSE BLOOD: 91
HEMOGLOBIN A1C: 6.7 % (ref 4.3–5.6)
TEST STRIP LOT #: NORMAL NUMERIC

## 2019-08-13 PROCEDURE — 36416 COLLJ CAPILLARY BLOOD SPEC: CPT | Performed by: NURSE PRACTITIONER

## 2019-08-13 PROCEDURE — 83036 HEMOGLOBIN GLYCOSYLATED A1C: CPT | Performed by: NURSE PRACTITIONER

## 2019-08-13 PROCEDURE — 99215 OFFICE O/P EST HI 40 MIN: CPT | Performed by: NURSE PRACTITIONER

## 2019-08-13 PROCEDURE — 82962 GLUCOSE BLOOD TEST: CPT | Performed by: NURSE PRACTITIONER

## 2019-08-13 RX ORDER — GLYBURIDE 3 MG/1
3 TABLET ORAL
Qty: 30 TABLET | Refills: 0 | Status: SHIPPED | OUTPATIENT
Start: 2019-08-13 | End: 2019-09-10

## 2019-08-13 RX ORDER — PIOGLITAZONEHYDROCHLORIDE 30 MG/1
30 TABLET ORAL DAILY
COMMUNITY
End: 2019-09-10

## 2019-08-13 NOTE — PROGRESS NOTES
Diabetes Consult    Name: Millie Caruso  Date: 8/13//2019    Referring Physician: Sylvie Alves    HISTORY OF PRESENT ILLNESS   Millie Caruso is a 69year old male who presents for diabetes consult/ evaluation    Significant other medical history  Includes TZD and DPP4 (Tradjenta)   Avoid SGLT2  Due to BPH also On oxybutynin and tamsulosin  And diuretic therapy   Avoid  DPP4 pt with hx of gastric upset on Bentyl     REVIEW OF SYSTEMS    Eyes: Diabetic retinopathy present: No            Most recent visit to e daily., Disp: , Rfl:   •  hydrocortisone (ANUSOL-HC) 2.5 % Rectal Cream, Place 1 Application rectally 2 (two) times daily as needed for Hemorrhoids. , Disp: 60 g, Rfl: 3  •  FELODIPINE ER 10 MG Oral Tablet 24 Hr, TAKE 1 TABLET DAILY, Disp: 90 tablet, Rfl: 0 Rfl: 3  •  Albuterol Sulfate HFA (PROAIR HFA) 108 (90 BASE) MCG/ACT Inhalation Aero Soln, Inhale 2 puffs into the lungs every 4 (four) hours as needed for Wheezing., Disp: 1 Inhaler, Rfl: 11  •  ofloxacin 0.3 % Ophthalmic Solution, Place 3 drops in ear(s) Kairn Levy MD at [facility]   • COLONOSCOPY     • UPPER GI ENDOSCOPY,DIAGNOSIS           PHYSICAL EXAM   08/13/19  1500   BP: 151/76   Pulse: 78   Weight: 188 lb (85.3 kg)       General Appearance:  alert, well developed, in no acute d CHO diet, recommend 45gm per meal or 135gm per day  -Low CHO diet and CHO counting  -Checking BG levels at home  -Provided patient diet / CHO education materials  -Provided blood glucose log book per CDE   -Diabetes [facility] [address] [place] [address] recommendation/ referral agrees to notify clinic of changes in medical care or medications. Send blood sugars in 2-3 days / follow up in one month     2.  Hypertension / Blood pressure   Reviewed goal BP < 130/80  Dash Diet Reviewed   ARB/ diuretic   Nephrology   BP Readings fro face time was 60 min more than 50% of the time was spent in counseling and/or coordination of care     Follow up in clinic in 1 months     Orders Placed This Encounter      POC Finger stick glucose [23793]      POC Glycohemoglobin [86642]    Ramon Knows

## 2019-08-13 NOTE — PATIENT INSTRUCTIONS
Medications for DM   Levemir 40 units SC in am   -----Decrease to 35 units in the morning     Glyburide 3 mg po po bid --- take only one 3 mg po daily with breakfast     Actos 30 mg po daily    STop metformin     Send blood sugars couple of days     FU i

## 2019-08-13 NOTE — TELEPHONE ENCOUNTER
Is this patent seeing me in the morning ?   If not please call  blood sugar update     IF has apt tomorrow will address in am, if further hypoglycemia after reduction in doses, may need Professional CGM for evaluation      Thank you   LG

## 2019-08-14 ENCOUNTER — TELEPHONE (OUTPATIENT)
Dept: ENDOCRINOLOGY CLINIC | Facility: CLINIC | Age: 74
End: 2019-08-14

## 2019-08-14 DIAGNOSIS — E11.65 UNCONTROLLED TYPE 2 DIABETES MELLITUS WITH HYPERGLYCEMIA (HCC): Primary | ICD-10-CM

## 2019-08-16 ENCOUNTER — TELEPHONE (OUTPATIENT)
Dept: ENDOCRINOLOGY CLINIC | Facility: CLINIC | Age: 74
End: 2019-08-16

## 2019-08-16 NOTE — TELEPHONE ENCOUNTER
Pt states he got up to use the restroom and felt dizzy, his sugar was 35. C/o blurry vision. Had a fruit cup and 10 skittles. Advised with LG that he should have juice or soda. Patient drinking soda. Phone call transferred to RN.     10:45pm last night, blo

## 2019-08-16 NOTE — TELEPHONE ENCOUNTER
Spoke with Elliot Mullins. BG now is 91 (before dinner). Discussed that he should hold glyburide and actos, Continue with Levemir only until we talk to him on Monday. He will continue checking BGs before meals and he is agreeable to also checking overnight.  Discus

## 2019-08-16 NOTE — TELEPHONE ENCOUNTER
Stop glyburide and actos for now  Only levemir 35 for now  Please call him on Monday for update  Also, please have him page us if BG are under 70.    If he has already taken actos and glyburide for today, please check Bg closely, also suggest waking up in t

## 2019-08-16 NOTE — TELEPHONE ENCOUNTER
Patient calling with low before lunch BG of 35 when he checked about 20 minutes ago. Is symptomatic - feels \"shaky. \" Has since treated with candy and soda. RN asked patient to re-check while on the phone. BG now 96.      Patient states that fasting BG tod

## 2019-08-19 ENCOUNTER — TELEPHONE (OUTPATIENT)
Dept: ENDOCRINOLOGY CLINIC | Facility: CLINIC | Age: 74
End: 2019-08-19

## 2019-08-19 DIAGNOSIS — E11.65 UNCONTROLLED TYPE 2 DIABETES MELLITUS WITH HYPERGLYCEMIA (HCC): Primary | ICD-10-CM

## 2019-08-19 NOTE — TELEPHONE ENCOUNTER
Spoke with Alcira Wiggins. Confirmed he has been taking Levemir 35 units daily. Will decrease to 30 daily as advised. Understands to call back if any numbers <100 and to call regardless in a few days with readings again.

## 2019-08-19 NOTE — TELEPHONE ENCOUNTER
Pt calling with BG readings    8/16- 7 am 100 - 12:40 pm 35 - 4:30pm 91- 9:30 pm  86      8/17 - 7 am 123  -  12 pm  183 - 3:30 pm 119    8/18 - 2:30 am 156 - 7:30 am 125 - 1pm  218  4pm  102 - 10:20pm 216    8/19 - 7 am 145 - 8:45am 146 - 12pm 130 - 12:45

## 2019-08-19 NOTE — TELEPHONE ENCOUNTER
Thank you     Patient is taking levemir 35 units daily according to Last TE/ please confirm       Have him decrease further to 30 units Levemir SC daily and continue to hold PO diabetic  agents and send blood sugars in a couple of days.      Call if blood s

## 2019-08-26 ENCOUNTER — NURSE ONLY (OUTPATIENT)
Dept: HEMATOLOGY/ONCOLOGY | Facility: HOSPITAL | Age: 74
End: 2019-08-26
Attending: INTERNAL MEDICINE
Payer: MEDICARE

## 2019-08-26 VITALS
RESPIRATION RATE: 16 BRPM | TEMPERATURE: 99 F | SYSTOLIC BLOOD PRESSURE: 147 MMHG | DIASTOLIC BLOOD PRESSURE: 74 MMHG | HEART RATE: 85 BPM

## 2019-08-26 DIAGNOSIS — N18.30 CHRONIC KIDNEY DISEASE, STAGE III (MODERATE) (HCC): Primary | ICD-10-CM

## 2019-08-26 LAB
ALBUMIN SERPL-MCNC: 3.2 G/DL (ref 3.4–5)
ANION GAP SERPL CALC-SCNC: 13 MMOL/L (ref 0–18)
BUN BLD-MCNC: 81 MG/DL (ref 7–18)
BUN/CREAT SERPL: 22.4 (ref 10–20)
CALCIUM BLD-MCNC: 9.2 MG/DL (ref 8.5–10.1)
CHLORIDE SERPL-SCNC: 104 MMOL/L (ref 98–112)
CO2 SERPL-SCNC: 20 MMOL/L (ref 21–32)
CREAT BLD-MCNC: 3.61 MG/DL (ref 0.7–1.3)
DEPRECATED HBV CORE AB SER IA-ACNC: 909.3 NG/ML (ref 30–530)
GLUCOSE BLD-MCNC: 219 MG/DL (ref 70–99)
HCT VFR BLD AUTO: 36.8 % (ref 39–53)
HGB BLD-MCNC: 12.1 G/DL (ref 13–17.5)
IRON SATURATION: 33 % (ref 20–50)
IRON SERPL-MCNC: 93 UG/DL (ref 65–175)
OSMOLALITY SERPL CALC.SUM OF ELEC: 315 MOSM/KG (ref 275–295)
PHOSPHATE SERPL-MCNC: 3.9 MG/DL (ref 2.5–4.9)
POTASSIUM SERPL-SCNC: 4.5 MMOL/L (ref 3.5–5.1)
SODIUM SERPL-SCNC: 137 MMOL/L (ref 136–145)
TOTAL IRON BINDING CAPACITY: 286 UG/DL (ref 240–450)
TRANSFERRIN SERPL-MCNC: 192 MG/DL (ref 200–360)

## 2019-08-26 PROCEDURE — 84466 ASSAY OF TRANSFERRIN: CPT | Performed by: INTERNAL MEDICINE

## 2019-08-26 PROCEDURE — 85014 HEMATOCRIT: CPT

## 2019-08-26 PROCEDURE — 83540 ASSAY OF IRON: CPT | Performed by: INTERNAL MEDICINE

## 2019-08-26 PROCEDURE — 80069 RENAL FUNCTION PANEL: CPT | Performed by: INTERNAL MEDICINE

## 2019-08-26 PROCEDURE — 82728 ASSAY OF FERRITIN: CPT | Performed by: INTERNAL MEDICINE

## 2019-08-26 PROCEDURE — 85018 HEMOGLOBIN: CPT

## 2019-08-26 PROCEDURE — 36415 COLL VENOUS BLD VENIPUNCTURE: CPT

## 2019-08-26 NOTE — PROGRESS NOTES
Patient to infusion for Aranesp injection. HGB = 12.1  HCT = 36.8  Patient completed 5 doses of Venofer 8/12/19. States he is feeling better, his strength is better.     Aranesp held today per parameters    Care Plan  Problem:  Heme Imbalance: identified,

## 2019-08-27 ENCOUNTER — TELEPHONE (OUTPATIENT)
Dept: ENDOCRINOLOGY CLINIC | Facility: CLINIC | Age: 74
End: 2019-08-27

## 2019-08-27 NOTE — TELEPHONE ENCOUNTER
Vijaya Padilla confirms current DM med is only Levemir 30 units daily. No reports of hypoglycemia in the past week. Discussed with patient that he can decrease frequency of checking BG to twice daily before meals given no low BGs under 70.  Will route sugars to pro

## 2019-08-28 NOTE — TELEPHONE ENCOUNTER
Spoke w/ Nicky Goss. He will CPM and call if sugars ever less than 70 or greater than 200. Booked 3 mo follow up on 11/4 with Dr. Fara Solis.

## 2019-09-10 ENCOUNTER — TELEPHONE (OUTPATIENT)
Dept: ENDOCRINOLOGY CLINIC | Facility: CLINIC | Age: 74
End: 2019-09-10

## 2019-09-10 DIAGNOSIS — E11.21 DIABETIC NEPHROPATHY ASSOCIATED WITH TYPE 2 DIABETES MELLITUS (HCC): ICD-10-CM

## 2019-09-10 RX ORDER — GLYBURIDE 3 MG/1
3 TABLET ORAL
Qty: 30 TABLET | Refills: 0 | OUTPATIENT
Start: 2019-09-10 | End: 2019-10-10

## 2019-09-10 RX ORDER — PIOGLITAZONEHYDROCHLORIDE 30 MG/1
30 TABLET ORAL DAILY
Refills: 0 | OUTPATIENT
Start: 2019-09-10

## 2019-09-10 NOTE — TELEPHONE ENCOUNTER
Pt calling in BG readings and asking RN to cancel medications that are being auto-filled at mail order pharm    9/1 221, 196, 201  9/2 166, 160  9/3 159, 194   9/4 162,   9/5 148, 151  9/6 177, 156  9/7 171, 184  9/8 143, 222  9/9 114, 164    Takes prednis

## 2019-09-11 NOTE — TELEPHONE ENCOUNTER
Wolf Burks understanding of plan to CPM and call if sugars persistently greater than 180 or less than 70. Confirmed appt in November.

## 2019-09-16 ENCOUNTER — TELEPHONE (OUTPATIENT)
Dept: ENDOCRINOLOGY CLINIC | Facility: CLINIC | Age: 74
End: 2019-09-16

## 2019-09-16 NOTE — TELEPHONE ENCOUNTER
Patient reporting blood sugars. Taking levemir 30 units daily. 9/11 ,   9/13   9/14 ,    9/15 ,   9/16 ,     CDE appt 9/20 to discuss diet.    FU with AM 11/4/19    Discussed the above with Dr. Savanna Voss

## 2019-09-20 ENCOUNTER — NURSE ONLY (OUTPATIENT)
Dept: ENDOCRINOLOGY CLINIC | Facility: CLINIC | Age: 74
End: 2019-09-20
Payer: MEDICARE

## 2019-09-20 VITALS — WEIGHT: 185 LBS | BODY MASS INDEX: 31 KG/M2

## 2019-09-20 DIAGNOSIS — E11.65 UNCONTROLLED TYPE 2 DIABETES MELLITUS WITH HYPERGLYCEMIA (HCC): Primary | ICD-10-CM

## 2019-09-20 PROCEDURE — 99201 OFFICE/OUTPT VISIT,NEW,LEVL I: CPT | Performed by: INTERNAL MEDICINE

## 2019-09-20 NOTE — PROGRESS NOTES
Mary Tony was seen for individual Diabetic Medical Nutrition Therapy:  Follow up     Date: Current Visit: 9/20/2019        Start time 10:30:AM   End time: 11:00:AM     Assessment  Patient has been struggling to follow low carbohdyrate DM diet and al snacks    [] discussed in depth meal planning using the healthy eating with diabetes plate method with focus on balanced macronutrient consumption, including identifying foods that are carbohydrates, lean protein, non-starchy vegetables, and heart healthy carbohydrate diet. 2. Continue current dose of insulin. Sugars are stable since they last contacted office. Dr. Emmanuel Maritn reviewed them at that time and no change in medication. 3. Follow up with provider as scheduled.

## 2019-09-23 ENCOUNTER — NURSE ONLY (OUTPATIENT)
Dept: HEMATOLOGY/ONCOLOGY | Facility: HOSPITAL | Age: 74
End: 2019-09-23
Attending: INTERNAL MEDICINE
Payer: MEDICARE

## 2019-09-23 VITALS
SYSTOLIC BLOOD PRESSURE: 142 MMHG | TEMPERATURE: 99 F | RESPIRATION RATE: 16 BRPM | DIASTOLIC BLOOD PRESSURE: 68 MMHG | HEART RATE: 82 BPM

## 2019-09-23 DIAGNOSIS — N18.30 CHRONIC KIDNEY DISEASE, STAGE III (MODERATE) (HCC): Primary | ICD-10-CM

## 2019-09-23 LAB
ALBUMIN SERPL-MCNC: 3.3 G/DL (ref 3.4–5)
ANION GAP SERPL CALC-SCNC: 8 MMOL/L (ref 0–18)
BUN BLD-MCNC: 64 MG/DL (ref 7–18)
BUN/CREAT SERPL: 21.5 (ref 10–20)
CALCIUM BLD-MCNC: 9.4 MG/DL (ref 8.5–10.1)
CHLORIDE SERPL-SCNC: 107 MMOL/L (ref 98–112)
CO2 SERPL-SCNC: 22 MMOL/L (ref 21–32)
CREAT BLD-MCNC: 2.98 MG/DL (ref 0.7–1.3)
GLUCOSE BLD-MCNC: 177 MG/DL (ref 70–99)
HCT VFR BLD AUTO: 36.2 % (ref 39–53)
HGB BLD-MCNC: 11.9 G/DL (ref 13–17.5)
OSMOLALITY SERPL CALC.SUM OF ELEC: 307 MOSM/KG (ref 275–295)
PHOSPHATE SERPL-MCNC: 3.7 MG/DL (ref 2.5–4.9)
POTASSIUM SERPL-SCNC: 5.1 MMOL/L (ref 3.5–5.1)
SODIUM SERPL-SCNC: 137 MMOL/L (ref 136–145)

## 2019-09-23 PROCEDURE — 85014 HEMATOCRIT: CPT

## 2019-09-23 PROCEDURE — 85018 HEMOGLOBIN: CPT

## 2019-09-23 PROCEDURE — 80069 RENAL FUNCTION PANEL: CPT | Performed by: INTERNAL MEDICINE

## 2019-09-23 PROCEDURE — 36415 COLL VENOUS BLD VENIPUNCTURE: CPT

## 2019-09-23 NOTE — PROGRESS NOTES
Patient to infusion for Aranesp injection. Lab Results   Component Value Date    HGB 11.9 (L) 09/23/2019    HCT 36.2 (L) 09/23/2019      Reports he is feeling well, denies any complaints    Labs collected left ac, tolerated well.      Per order no aranesp

## 2019-10-21 ENCOUNTER — APPOINTMENT (OUTPATIENT)
Dept: HEMATOLOGY/ONCOLOGY | Facility: HOSPITAL | Age: 74
End: 2019-10-21
Attending: INTERNAL MEDICINE
Payer: MEDICARE

## 2019-11-05 ENCOUNTER — APPOINTMENT (OUTPATIENT)
Dept: LAB | Facility: HOSPITAL | Age: 74
End: 2019-11-05
Attending: INTERNAL MEDICINE
Payer: MEDICARE

## 2019-11-05 DIAGNOSIS — N18.30 CHRONIC KIDNEY DISEASE, STAGE III (MODERATE) (HCC): ICD-10-CM

## 2019-11-05 PROCEDURE — 85018 HEMOGLOBIN: CPT

## 2019-11-05 PROCEDURE — 80069 RENAL FUNCTION PANEL: CPT

## 2019-11-05 PROCEDURE — 36415 COLL VENOUS BLD VENIPUNCTURE: CPT

## 2019-11-05 PROCEDURE — 85014 HEMATOCRIT: CPT

## 2019-11-05 PROCEDURE — 83970 ASSAY OF PARATHORMONE: CPT

## 2019-11-18 ENCOUNTER — APPOINTMENT (OUTPATIENT)
Dept: HEMATOLOGY/ONCOLOGY | Facility: HOSPITAL | Age: 74
End: 2019-11-18
Attending: INTERNAL MEDICINE
Payer: MEDICARE

## 2019-11-18 NOTE — TELEPHONE ENCOUNTER
Dr Angie Lamas patient last visit on 12/1/16 otitis externa request a refill stated he does not have any symptoms just in case he will need it ,please advise. [FreeTextEntry1] : some neck pain\par still smoking\par Overall otherwise unchanged. Arthritis complaints generally better. Using small doses of Mobic intermittent

## 2019-11-21 RX ORDER — OFLOXACIN 3 MG/ML
SOLUTION/ DROPS OPHTHALMIC
Qty: 10 ML | Refills: 0 | OUTPATIENT
Start: 2019-11-21

## 2019-11-22 NOTE — TELEPHONE ENCOUNTER
Informed patient that he need to see Dr Raisa Combs for refill since last visit was on 2/16/16,patient verbalized understanding.

## 2019-11-27 ENCOUNTER — APPOINTMENT (OUTPATIENT)
Dept: LAB | Facility: HOSPITAL | Age: 74
End: 2019-11-27
Attending: INTERNAL MEDICINE
Payer: MEDICARE

## 2019-11-27 DIAGNOSIS — N18.30 CHRONIC KIDNEY DISEASE, STAGE III (MODERATE) (HCC): ICD-10-CM

## 2019-11-27 PROCEDURE — 83970 ASSAY OF PARATHORMONE: CPT

## 2019-11-27 PROCEDURE — 80069 RENAL FUNCTION PANEL: CPT

## 2019-11-27 PROCEDURE — 85014 HEMATOCRIT: CPT

## 2019-11-27 PROCEDURE — 36415 COLL VENOUS BLD VENIPUNCTURE: CPT

## 2019-11-27 PROCEDURE — 85018 HEMOGLOBIN: CPT

## 2019-12-04 PROBLEM — N18.9 ANEMIA OF CHRONIC RENAL FAILURE: Status: ACTIVE | Noted: 2019-12-04

## 2019-12-04 PROBLEM — D63.1 ANEMIA OF CHRONIC RENAL FAILURE: Status: ACTIVE | Noted: 2019-12-04

## 2019-12-04 PROBLEM — N18.4 CHRONIC KIDNEY DISEASE, STAGE IV (SEVERE) (HCC): Status: ACTIVE | Noted: 2019-12-04

## 2019-12-10 ENCOUNTER — TELEPHONE (OUTPATIENT)
Dept: HEMATOLOGY/ONCOLOGY | Facility: HOSPITAL | Age: 74
End: 2019-12-10

## 2019-12-11 ENCOUNTER — NURSE ONLY (OUTPATIENT)
Dept: HEMATOLOGY/ONCOLOGY | Facility: HOSPITAL | Age: 74
End: 2019-12-11
Attending: INTERNAL MEDICINE
Payer: MEDICARE

## 2019-12-11 VITALS
TEMPERATURE: 98 F | RESPIRATION RATE: 18 BRPM | HEART RATE: 78 BPM | DIASTOLIC BLOOD PRESSURE: 62 MMHG | SYSTOLIC BLOOD PRESSURE: 159 MMHG

## 2019-12-11 DIAGNOSIS — N18.4 CHRONIC KIDNEY DISEASE, STAGE IV (SEVERE) (HCC): Primary | ICD-10-CM

## 2019-12-11 DIAGNOSIS — N18.9 ANEMIA OF CHRONIC RENAL FAILURE: ICD-10-CM

## 2019-12-11 DIAGNOSIS — D63.1 ANEMIA OF CHRONIC RENAL FAILURE: ICD-10-CM

## 2019-12-11 PROCEDURE — 82728 ASSAY OF FERRITIN: CPT

## 2019-12-11 PROCEDURE — 85014 HEMATOCRIT: CPT

## 2019-12-11 PROCEDURE — 83540 ASSAY OF IRON: CPT

## 2019-12-11 PROCEDURE — 36415 COLL VENOUS BLD VENIPUNCTURE: CPT

## 2019-12-11 PROCEDURE — 85018 HEMOGLOBIN: CPT

## 2019-12-11 PROCEDURE — 84466 ASSAY OF TRANSFERRIN: CPT

## 2019-12-11 PROCEDURE — 96372 THER/PROPH/DIAG INJ SC/IM: CPT

## 2019-12-11 NOTE — PROGRESS NOTES
Patient to infusion for Aranesp injection. Lab Results   Component Value Date    HGB 9.0 (L) 12/11/2019    HCT 28.2 (L) 12/11/2019      Reports he is well. Complains of SOB with activity and coldness.      Aranesp given in left upper arm, patient tolerated

## 2019-12-17 ENCOUNTER — OFFICE VISIT (OUTPATIENT)
Dept: ENDOCRINOLOGY CLINIC | Facility: CLINIC | Age: 74
End: 2019-12-17
Payer: MEDICARE

## 2019-12-17 VITALS
SYSTOLIC BLOOD PRESSURE: 154 MMHG | WEIGHT: 171.38 LBS | HEART RATE: 79 BPM | DIASTOLIC BLOOD PRESSURE: 72 MMHG | BODY MASS INDEX: 29 KG/M2

## 2019-12-17 DIAGNOSIS — E11.65 TYPE 2 DIABETES MELLITUS WITH HYPERGLYCEMIA, WITH LONG-TERM CURRENT USE OF INSULIN (HCC): Primary | ICD-10-CM

## 2019-12-17 DIAGNOSIS — Z79.4 TYPE 2 DIABETES MELLITUS WITH HYPERGLYCEMIA, WITH LONG-TERM CURRENT USE OF INSULIN (HCC): Primary | ICD-10-CM

## 2019-12-17 PROCEDURE — 99214 OFFICE O/P EST MOD 30 MIN: CPT | Performed by: INTERNAL MEDICINE

## 2019-12-17 NOTE — PROGRESS NOTES
Diabetes FU      HISTORY OF PRESENT ILLNESS   Calixto Rush is a 69year old male who presents for diabetes FU.     Significant other medical history  Includes  hypertension,  hyperlipidemia, asthma, emphysema,  sleep apnea, asthma, anemia, CKD stage IV Nasal Suspension, USE 2 SPRAYS NASALLY DAILY, Disp: 48 g, Rfl: 2  •  LOSARTAN 100 MG Oral Tab, TAKE 1 TABLET DAILY, Disp: 90 tablet, Rfl: 4  •  INDAPAMIDE 2.5 MG Oral Tab, TAKE 1 TABLET EVERY MORNING, Disp: 90 tablet, Rfl: 4  •  Budesonide-Formoterol Fumar (PROAIR HFA) 108 (90 BASE) MCG/ACT Inhalation Aero Soln, Inhale 2 puffs into the lungs every 4 (four) hours as needed for Wheezing., Disp: 1 Inhaler, Rfl: 11  •  ofloxacin 0.3 % Ophthalmic Solution, Place 3 drops in ear(s) 3 (three) times daily. , Disp: 10 Performed by Flaco Souza MD at [facility]   • COLONOSCOPY     • UPPER GI ENDOSCOPY,DIAGNOSIS           PHYSICAL EXAM   12/17/19  1503   BP: 154/72   Pulse: 79   Weight: 171 lb 6.4 oz (77.7 kg)       General Appearance:  alert, well deve in a week after dose increase, he will increase to 34 daily    2. Hypertension / Blood pressure   BP is slightly high today  Low salt diet, FU with PCP  Monitor BP at home    3.  Hyperlipidemia / Lipids   Goal LDL < 100 / Triglycerides < 150   Reviewed goal

## 2020-01-08 ENCOUNTER — NURSE ONLY (OUTPATIENT)
Dept: HEMATOLOGY/ONCOLOGY | Facility: HOSPITAL | Age: 75
End: 2020-01-08
Attending: INTERNAL MEDICINE
Payer: MEDICARE

## 2020-01-08 VITALS
TEMPERATURE: 99 F | DIASTOLIC BLOOD PRESSURE: 68 MMHG | SYSTOLIC BLOOD PRESSURE: 163 MMHG | RESPIRATION RATE: 18 BRPM | HEART RATE: 79 BPM

## 2020-01-08 DIAGNOSIS — N18.4 CHRONIC KIDNEY DISEASE, STAGE IV (SEVERE) (HCC): Primary | ICD-10-CM

## 2020-01-08 DIAGNOSIS — D63.1 ANEMIA OF CHRONIC RENAL FAILURE: ICD-10-CM

## 2020-01-08 DIAGNOSIS — N18.9 ANEMIA OF CHRONIC RENAL FAILURE: ICD-10-CM

## 2020-01-08 LAB
HCT VFR BLD AUTO: 29.7 % (ref 39–53)
HGB BLD-MCNC: 9.4 G/DL (ref 13–17.5)

## 2020-01-08 PROCEDURE — 36415 COLL VENOUS BLD VENIPUNCTURE: CPT

## 2020-01-08 PROCEDURE — 96372 THER/PROPH/DIAG INJ SC/IM: CPT

## 2020-01-08 PROCEDURE — 85014 HEMATOCRIT: CPT

## 2020-01-08 PROCEDURE — 85018 HEMOGLOBIN: CPT

## 2020-01-08 NOTE — PROGRESS NOTES
Patient to infusion for Aranesp injection. HGB = 9.4   Patient has complaint of less energy, shortness of breath with exertion. Does have asthma as well. Patient tolerated injection well, administered per parameters.  Patient verbalizes understanding of A

## 2020-02-05 ENCOUNTER — NURSE ONLY (OUTPATIENT)
Dept: HEMATOLOGY/ONCOLOGY | Facility: HOSPITAL | Age: 75
End: 2020-02-05
Attending: INTERNAL MEDICINE
Payer: MEDICARE

## 2020-02-05 DIAGNOSIS — D63.1 ANEMIA OF CHRONIC RENAL FAILURE: ICD-10-CM

## 2020-02-05 DIAGNOSIS — N18.9 ANEMIA OF CHRONIC RENAL FAILURE: ICD-10-CM

## 2020-02-05 DIAGNOSIS — N18.4 CHRONIC KIDNEY DISEASE, STAGE IV (SEVERE) (HCC): Primary | ICD-10-CM

## 2020-02-05 LAB
HCT VFR BLD AUTO: 31.6 % (ref 39–53)
HGB BLD-MCNC: 10.4 G/DL (ref 13–17.5)

## 2020-02-05 PROCEDURE — 85014 HEMATOCRIT: CPT

## 2020-02-05 PROCEDURE — 85018 HEMOGLOBIN: CPT

## 2020-02-05 PROCEDURE — 96372 THER/PROPH/DIAG INJ SC/IM: CPT

## 2020-02-05 PROCEDURE — 36415 COLL VENOUS BLD VENIPUNCTURE: CPT

## 2020-02-05 NOTE — PROGRESS NOTES
H and H collected from rt ac vein with 23g butterfly    Patient to infusion for Aranesp injection. HGB = 10.4   Patient has some fatigue but otherwise fells well  Patient tolerated injection well, administered per parameters.  Patient verbalizes understand

## 2020-02-19 PROBLEM — N18.30 CHRONIC KIDNEY DISEASE, STAGE III (MODERATE) (HCC): Status: RESOLVED | Noted: 2018-03-27 | Resolved: 2020-02-19

## 2020-02-19 PROBLEM — G47.33 OSA (OBSTRUCTIVE SLEEP APNEA): Status: RESOLVED | Noted: 2017-04-25 | Resolved: 2020-02-19

## 2020-02-20 PROBLEM — G31.9 BRAIN ATROPHY: Status: ACTIVE | Noted: 2020-02-20

## 2020-02-20 PROBLEM — G31.9 BRAIN ATROPHY (HCC): Status: ACTIVE | Noted: 2020-02-20

## 2020-03-02 PROBLEM — I50.33 ACUTE ON CHRONIC DIASTOLIC HEART FAILURE (HCC): Status: ACTIVE | Noted: 2020-03-02

## 2020-03-04 ENCOUNTER — NURSE ONLY (OUTPATIENT)
Dept: HEMATOLOGY/ONCOLOGY | Facility: HOSPITAL | Age: 75
End: 2020-03-04
Attending: INTERNAL MEDICINE
Payer: MEDICARE

## 2020-03-04 VITALS
DIASTOLIC BLOOD PRESSURE: 67 MMHG | TEMPERATURE: 98 F | SYSTOLIC BLOOD PRESSURE: 160 MMHG | RESPIRATION RATE: 16 BRPM | HEART RATE: 70 BPM

## 2020-03-04 DIAGNOSIS — N18.4 ANEMIA OF CHRONIC RENAL FAILURE, STAGE 4 (SEVERE) (HCC): ICD-10-CM

## 2020-03-04 DIAGNOSIS — N18.4 CHRONIC KIDNEY DISEASE, STAGE IV (SEVERE) (HCC): Primary | ICD-10-CM

## 2020-03-04 DIAGNOSIS — D63.1 ANEMIA OF CHRONIC RENAL FAILURE, STAGE 4 (SEVERE) (HCC): ICD-10-CM

## 2020-03-04 LAB
DEPRECATED HBV CORE AB SER IA-ACNC: 503.1 NG/ML (ref 30–530)
HCT VFR BLD AUTO: 31.5 % (ref 39–53)
HGB BLD-MCNC: 10.1 G/DL (ref 13–17.5)
IRON SATURATION: 26 % (ref 20–50)
IRON SERPL-MCNC: 65 UG/DL (ref 65–175)
TOTAL IRON BINDING CAPACITY: 252 UG/DL (ref 240–450)
TRANSFERRIN SERPL-MCNC: 169 MG/DL (ref 200–360)

## 2020-03-04 PROCEDURE — 36415 COLL VENOUS BLD VENIPUNCTURE: CPT

## 2020-03-04 PROCEDURE — 83540 ASSAY OF IRON: CPT

## 2020-03-04 PROCEDURE — 96372 THER/PROPH/DIAG INJ SC/IM: CPT

## 2020-03-04 PROCEDURE — 84466 ASSAY OF TRANSFERRIN: CPT

## 2020-03-04 PROCEDURE — 85014 HEMATOCRIT: CPT

## 2020-03-04 PROCEDURE — 85018 HEMOGLOBIN: CPT

## 2020-03-04 PROCEDURE — 82728 ASSAY OF FERRITIN: CPT

## 2020-03-04 NOTE — PROGRESS NOTES
Patient to infusion for Aranesp injection. HGB = 10.1    Patient has complaint of shortness of breath with exertion at times. Patient tolerated injection well, administered per parameters.  Patient verbalizes understanding of Aranesp injection and MISHA murrieta

## 2020-04-01 ENCOUNTER — NURSE ONLY (OUTPATIENT)
Dept: HEMATOLOGY/ONCOLOGY | Facility: HOSPITAL | Age: 75
End: 2020-04-01
Attending: INTERNAL MEDICINE
Payer: MEDICARE

## 2020-04-01 VITALS
DIASTOLIC BLOOD PRESSURE: 55 MMHG | TEMPERATURE: 98 F | SYSTOLIC BLOOD PRESSURE: 140 MMHG | HEART RATE: 69 BPM | OXYGEN SATURATION: 100 % | RESPIRATION RATE: 18 BRPM

## 2020-04-01 DIAGNOSIS — N18.4 CHRONIC KIDNEY DISEASE, STAGE IV (SEVERE) (HCC): Primary | ICD-10-CM

## 2020-04-01 DIAGNOSIS — N18.4 ANEMIA OF CHRONIC RENAL FAILURE, STAGE 4 (SEVERE) (HCC): ICD-10-CM

## 2020-04-01 DIAGNOSIS — D63.1 ANEMIA OF CHRONIC RENAL FAILURE, STAGE 4 (SEVERE) (HCC): ICD-10-CM

## 2020-04-01 PROCEDURE — 96372 THER/PROPH/DIAG INJ SC/IM: CPT

## 2020-04-01 PROCEDURE — 36415 COLL VENOUS BLD VENIPUNCTURE: CPT

## 2020-04-01 PROCEDURE — 85014 HEMATOCRIT: CPT

## 2020-04-01 PROCEDURE — 85018 HEMOGLOBIN: CPT

## 2020-04-01 NOTE — PROGRESS NOTES
Patient to infusion for monthly h/h and possible Aranesp injection. HGB = 10.4    Patient has complaint mild fatigue, mild shortness of breath with exertion at times. Patient tolerated injection well, administered per parameters.  Patient verbalizes under

## 2020-04-17 ENCOUNTER — TELEPHONE (OUTPATIENT)
Dept: ENDOCRINOLOGY CLINIC | Facility: CLINIC | Age: 75
End: 2020-04-17

## 2020-04-17 ENCOUNTER — VIRTUAL PHONE E/M (OUTPATIENT)
Dept: ENDOCRINOLOGY CLINIC | Facility: CLINIC | Age: 75
End: 2020-04-17
Payer: MEDICARE

## 2020-04-17 DIAGNOSIS — E78.5 DYSLIPIDEMIA: ICD-10-CM

## 2020-04-17 DIAGNOSIS — Z79.4 TYPE 2 DIABETES MELLITUS WITH OTHER SPECIFIED COMPLICATION, WITH LONG-TERM CURRENT USE OF INSULIN (HCC): Primary | ICD-10-CM

## 2020-04-17 DIAGNOSIS — E11.69 TYPE 2 DIABETES MELLITUS WITH OTHER SPECIFIED COMPLICATION, WITH LONG-TERM CURRENT USE OF INSULIN (HCC): Primary | ICD-10-CM

## 2020-04-17 PROCEDURE — 99442 PHONE E/M BY PHYS 11-20 MIN: CPT | Performed by: INTERNAL MEDICINE

## 2020-04-17 NOTE — TELEPHONE ENCOUNTER
Appointment booked as requested below.     Future Appointments   Date Time Provider Etelvina Fajardo   8/18/2020  9:00 AM Frieda Prater MD [facility]

## 2020-04-17 NOTE — TELEPHONE ENCOUNTER
Patient wants diabetic shoes.    He is not sure how to get the forms   Could we find out with his insurance please  Thanks

## 2020-04-17 NOTE — PROGRESS NOTES
Virtual Telephone Check-In    Bon Rosales verbally consents to  a Virtual/Telephone Check-In visit on 04/17/20. Patient understands and accepts financial responsibility for any deductible, co-insurance and/or co-pays associated with this service. person, place and time. Normal affect  Pulmonary:  Speaking in full sentences         PLAN:     1. Type 2 DM:  a1c 7.6 % in 2/2020     Plan:  Discussed the pathogenesis, natural course of diabetes.  Patient understands the importance of glycemic control and decision making.   Appropriate medical decision-making and tests are ordered as detailed in the plan of care above.”        Miya Altamirano MD

## 2020-04-17 NOTE — TELEPHONE ENCOUNTER
Patient was contacted and was given [address] ([phone]) phone number.   RN instructed him to call the number and let them know he is interested in getting diabetic shoes, they will get his information including insurance information and they will

## 2020-04-29 ENCOUNTER — NURSE ONLY (OUTPATIENT)
Dept: HEMATOLOGY/ONCOLOGY | Facility: HOSPITAL | Age: 75
End: 2020-04-29
Attending: INTERNAL MEDICINE
Payer: MEDICARE

## 2020-04-29 VITALS
DIASTOLIC BLOOD PRESSURE: 54 MMHG | TEMPERATURE: 98 F | SYSTOLIC BLOOD PRESSURE: 152 MMHG | RESPIRATION RATE: 18 BRPM | OXYGEN SATURATION: 99 % | HEART RATE: 64 BPM

## 2020-04-29 DIAGNOSIS — D63.1 ANEMIA OF CHRONIC RENAL FAILURE, STAGE 4 (SEVERE) (HCC): ICD-10-CM

## 2020-04-29 DIAGNOSIS — N18.4 CHRONIC KIDNEY DISEASE, STAGE IV (SEVERE) (HCC): Primary | ICD-10-CM

## 2020-04-29 DIAGNOSIS — N18.4 ANEMIA OF CHRONIC RENAL FAILURE, STAGE 4 (SEVERE) (HCC): ICD-10-CM

## 2020-04-29 PROCEDURE — 85018 HEMOGLOBIN: CPT

## 2020-04-29 PROCEDURE — 36415 COLL VENOUS BLD VENIPUNCTURE: CPT

## 2020-04-29 PROCEDURE — 96372 THER/PROPH/DIAG INJ SC/IM: CPT

## 2020-04-29 PROCEDURE — 85014 HEMATOCRIT: CPT

## 2020-04-29 NOTE — PROGRESS NOTES
Patient arrived to lab gait steady walks slow has some shortness or breath. Labs drawn as ordered from left [facility], patient discharged back to lobby to wait for results. Returned to lab at 0900, Hgb 10.4  Aranesp given sub-q in left  upper arm.   No bleedi

## 2020-05-27 ENCOUNTER — NURSE ONLY (OUTPATIENT)
Dept: HEMATOLOGY/ONCOLOGY | Facility: HOSPITAL | Age: 75
End: 2020-05-27
Attending: INTERNAL MEDICINE
Payer: MEDICARE

## 2020-05-27 VITALS
DIASTOLIC BLOOD PRESSURE: 56 MMHG | RESPIRATION RATE: 18 BRPM | HEART RATE: 62 BPM | TEMPERATURE: 98 F | SYSTOLIC BLOOD PRESSURE: 158 MMHG | OXYGEN SATURATION: 100 %

## 2020-05-27 DIAGNOSIS — D63.1 ANEMIA OF CHRONIC RENAL FAILURE, STAGE 4 (SEVERE) (HCC): ICD-10-CM

## 2020-05-27 DIAGNOSIS — N18.4 ANEMIA OF CHRONIC RENAL FAILURE, STAGE 4 (SEVERE) (HCC): ICD-10-CM

## 2020-05-27 DIAGNOSIS — N18.4 CHRONIC KIDNEY DISEASE, STAGE IV (SEVERE) (HCC): Primary | ICD-10-CM

## 2020-05-27 PROCEDURE — 84466 ASSAY OF TRANSFERRIN: CPT

## 2020-05-27 PROCEDURE — 96372 THER/PROPH/DIAG INJ SC/IM: CPT

## 2020-05-27 PROCEDURE — 85014 HEMATOCRIT: CPT

## 2020-05-27 PROCEDURE — 85018 HEMOGLOBIN: CPT

## 2020-05-27 PROCEDURE — 36415 COLL VENOUS BLD VENIPUNCTURE: CPT

## 2020-05-27 PROCEDURE — 83540 ASSAY OF IRON: CPT

## 2020-05-27 PROCEDURE — 82728 ASSAY OF FERRITIN: CPT

## 2020-05-27 NOTE — PROGRESS NOTES
Patient arrived to lab gait steady walks slow has some shortness or breath. States feeling about the same - more good days then bad. Labs drawn as ordered from [facility], patient discharged back to lobby to wait for results.   Returned to lab at 0850 Hgb 1

## 2020-06-24 ENCOUNTER — NURSE ONLY (OUTPATIENT)
Dept: HEMATOLOGY/ONCOLOGY | Facility: HOSPITAL | Age: 75
End: 2020-06-24
Attending: INTERNAL MEDICINE
Payer: MEDICARE

## 2020-06-24 VITALS
SYSTOLIC BLOOD PRESSURE: 150 MMHG | TEMPERATURE: 97 F | DIASTOLIC BLOOD PRESSURE: 78 MMHG | RESPIRATION RATE: 18 BRPM | HEART RATE: 74 BPM | OXYGEN SATURATION: 100 %

## 2020-06-24 DIAGNOSIS — N18.4 CHRONIC KIDNEY DISEASE, STAGE IV (SEVERE) (HCC): Primary | ICD-10-CM

## 2020-06-24 DIAGNOSIS — D63.1 ANEMIA OF CHRONIC RENAL FAILURE, STAGE 4 (SEVERE) (HCC): ICD-10-CM

## 2020-06-24 DIAGNOSIS — N18.4 ANEMIA OF CHRONIC RENAL FAILURE, STAGE 4 (SEVERE) (HCC): ICD-10-CM

## 2020-06-24 PROCEDURE — 36415 COLL VENOUS BLD VENIPUNCTURE: CPT

## 2020-06-24 PROCEDURE — 85014 HEMATOCRIT: CPT

## 2020-06-24 PROCEDURE — 96372 THER/PROPH/DIAG INJ SC/IM: CPT

## 2020-06-24 PROCEDURE — 85018 HEMOGLOBIN: CPT

## 2020-06-24 NOTE — PROGRESS NOTES
Patient to lab for monthly h/h and possible Aranesp injection. HGB = 10.5    Patient has complaint mild fatigue, mild shortness of breath with exertion at times. Patient tolerated injection well, administered per parameters.  Patient verbalizes understand

## 2020-07-22 ENCOUNTER — NURSE ONLY (OUTPATIENT)
Dept: HEMATOLOGY/ONCOLOGY | Facility: HOSPITAL | Age: 75
End: 2020-07-22
Attending: INTERNAL MEDICINE
Payer: MEDICARE

## 2020-07-22 VITALS
TEMPERATURE: 99 F | RESPIRATION RATE: 18 BRPM | HEART RATE: 76 BPM | SYSTOLIC BLOOD PRESSURE: 130 MMHG | DIASTOLIC BLOOD PRESSURE: 48 MMHG

## 2020-07-22 DIAGNOSIS — D63.1 ANEMIA OF CHRONIC RENAL FAILURE, STAGE 4 (SEVERE) (HCC): ICD-10-CM

## 2020-07-22 DIAGNOSIS — N18.4 CHRONIC KIDNEY DISEASE, STAGE IV (SEVERE) (HCC): Primary | ICD-10-CM

## 2020-07-22 DIAGNOSIS — N18.4 ANEMIA OF CHRONIC RENAL FAILURE, STAGE 4 (SEVERE) (HCC): ICD-10-CM

## 2020-07-22 LAB
ALBUMIN SERPL-MCNC: 3.1 G/DL (ref 3.4–5)
ANION GAP SERPL CALC-SCNC: 8 MMOL/L (ref 0–18)
BUN BLD-MCNC: 61 MG/DL (ref 7–18)
BUN/CREAT SERPL: 21.2 (ref 10–20)
CALCIUM BLD-MCNC: 8.8 MG/DL (ref 8.5–10.1)
CHLORIDE SERPL-SCNC: 107 MMOL/L (ref 98–112)
CO2 SERPL-SCNC: 20 MMOL/L (ref 21–32)
CREAT BLD-MCNC: 2.88 MG/DL (ref 0.7–1.3)
GLUCOSE BLD-MCNC: 172 MG/DL (ref 70–99)
HCT VFR BLD AUTO: 34.8 % (ref 39–53)
HGB BLD-MCNC: 11.1 G/DL (ref 13–17.5)
OSMOLALITY SERPL CALC.SUM OF ELEC: 301 MOSM/KG (ref 275–295)
PHOSPHATE SERPL-MCNC: 3.5 MG/DL (ref 2.5–4.9)
SODIUM SERPL-SCNC: 135 MMOL/L (ref 136–145)

## 2020-07-22 PROCEDURE — 85014 HEMATOCRIT: CPT

## 2020-07-22 PROCEDURE — 85018 HEMOGLOBIN: CPT

## 2020-07-22 PROCEDURE — 80069 RENAL FUNCTION PANEL: CPT

## 2020-07-22 PROCEDURE — 36415 COLL VENOUS BLD VENIPUNCTURE: CPT

## 2020-07-22 NOTE — PROGRESS NOTES
Lab drawn today. HGB 11.1 thus aranesp was held. Appointment made to return in 2 weeks per orders. Pt discharged home with future appointments.

## 2020-08-05 ENCOUNTER — NURSE ONLY (OUTPATIENT)
Dept: HEMATOLOGY/ONCOLOGY | Facility: HOSPITAL | Age: 75
End: 2020-08-05
Attending: INTERNAL MEDICINE
Payer: MEDICARE

## 2020-08-05 VITALS
RESPIRATION RATE: 18 BRPM | DIASTOLIC BLOOD PRESSURE: 69 MMHG | HEART RATE: 72 BPM | TEMPERATURE: 98 F | OXYGEN SATURATION: 100 % | SYSTOLIC BLOOD PRESSURE: 169 MMHG

## 2020-08-05 DIAGNOSIS — D63.1 ANEMIA OF CHRONIC RENAL FAILURE, STAGE 4 (SEVERE) (HCC): ICD-10-CM

## 2020-08-05 DIAGNOSIS — D63.1 ANEMIA OF CHRONIC RENAL FAILURE, UNSPECIFIED CKD STAGE: ICD-10-CM

## 2020-08-05 DIAGNOSIS — N18.4 CHRONIC KIDNEY DISEASE, STAGE IV (SEVERE) (HCC): Primary | ICD-10-CM

## 2020-08-05 DIAGNOSIS — N18.4 ANEMIA OF CHRONIC RENAL FAILURE, STAGE 4 (SEVERE) (HCC): ICD-10-CM

## 2020-08-05 DIAGNOSIS — N18.9 ANEMIA OF CHRONIC RENAL FAILURE, UNSPECIFIED CKD STAGE: ICD-10-CM

## 2020-08-05 LAB
ALBUMIN SERPL-MCNC: 3.1 G/DL (ref 3.4–5)
ANION GAP SERPL CALC-SCNC: 5 MMOL/L (ref 0–18)
BUN BLD-MCNC: 61 MG/DL (ref 7–18)
BUN/CREAT SERPL: 21.6 (ref 10–20)
CALCIUM BLD-MCNC: 8.9 MG/DL (ref 8.5–10.1)
CHLORIDE SERPL-SCNC: 110 MMOL/L (ref 98–112)
CO2 SERPL-SCNC: 23 MMOL/L (ref 21–32)
CREAT BLD-MCNC: 2.83 MG/DL (ref 0.7–1.3)
GLUCOSE BLD-MCNC: 160 MG/DL (ref 70–99)
HCT VFR BLD AUTO: 34.9 % (ref 39–53)
HGB BLD-MCNC: 11.3 G/DL (ref 13–17.5)
OSMOLALITY SERPL CALC.SUM OF ELEC: 307 MOSM/KG (ref 275–295)
PHOSPHATE SERPL-MCNC: 3.5 MG/DL (ref 2.5–4.9)
POTASSIUM SERPL-SCNC: 4.9 MMOL/L (ref 3.5–5.1)
PTH-INTACT SERPL-MCNC: 140.9 PG/ML (ref 18.5–88)
SODIUM SERPL-SCNC: 138 MMOL/L (ref 136–145)

## 2020-08-05 PROCEDURE — 80069 RENAL FUNCTION PANEL: CPT

## 2020-08-05 PROCEDURE — 36415 COLL VENOUS BLD VENIPUNCTURE: CPT

## 2020-08-05 PROCEDURE — 83970 ASSAY OF PARATHORMONE: CPT

## 2020-08-05 PROCEDURE — 85014 HEMATOCRIT: CPT

## 2020-08-05 PROCEDURE — 85018 HEMOGLOBIN: CPT

## 2020-08-05 NOTE — PROGRESS NOTES
Patient to lab for a 2 week h/h check and possible Aranesp injection. HGB = 11.3    Patient has complaint mild fatigue, mild shortness of breath with exertion at times. Chronic pain to back is a bit more today, will see a specialist, he said, to help.   Brynn Woodruff

## 2020-08-11 DIAGNOSIS — N18.4 CKD (CHRONIC KIDNEY DISEASE) STAGE 4, GFR 15-29 ML/MIN (HCC): Primary | ICD-10-CM

## 2020-08-18 ENCOUNTER — OFFICE VISIT (OUTPATIENT)
Dept: ENDOCRINOLOGY CLINIC | Facility: CLINIC | Age: 75
End: 2020-08-18
Payer: MEDICARE

## 2020-08-18 VITALS
BODY MASS INDEX: 28.16 KG/M2 | SYSTOLIC BLOOD PRESSURE: 144 MMHG | TEMPERATURE: 99 F | DIASTOLIC BLOOD PRESSURE: 70 MMHG | HEART RATE: 76 BPM | HEIGHT: 65 IN | WEIGHT: 169 LBS

## 2020-08-18 DIAGNOSIS — E78.5 DYSLIPIDEMIA: ICD-10-CM

## 2020-08-18 DIAGNOSIS — E11.65 TYPE 2 DIABETES MELLITUS WITH HYPERGLYCEMIA, WITH LONG-TERM CURRENT USE OF INSULIN (HCC): Primary | ICD-10-CM

## 2020-08-18 DIAGNOSIS — Z79.4 TYPE 2 DIABETES MELLITUS WITH HYPERGLYCEMIA, WITH LONG-TERM CURRENT USE OF INSULIN (HCC): Primary | ICD-10-CM

## 2020-08-18 LAB
GLUCOSE BLOOD: 162
TEST STRIP LOT #: NORMAL NUMERIC

## 2020-08-18 PROCEDURE — 3077F SYST BP >= 140 MM HG: CPT | Performed by: INTERNAL MEDICINE

## 2020-08-18 PROCEDURE — 36416 COLLJ CAPILLARY BLOOD SPEC: CPT | Performed by: INTERNAL MEDICINE

## 2020-08-18 PROCEDURE — 82962 GLUCOSE BLOOD TEST: CPT | Performed by: INTERNAL MEDICINE

## 2020-08-18 PROCEDURE — 99214 OFFICE O/P EST MOD 30 MIN: CPT | Performed by: INTERNAL MEDICINE

## 2020-08-18 PROCEDURE — 3078F DIAST BP <80 MM HG: CPT | Performed by: INTERNAL MEDICINE

## 2020-08-18 PROCEDURE — 3008F BODY MASS INDEX DOCD: CPT | Performed by: INTERNAL MEDICINE

## 2020-08-18 NOTE — PROGRESS NOTES
Diabetes FU      HISTORY OF PRESENT ILLNESS   Bhupendra Herr is a 70year old male who presents for diabetes FU.         Diagnosed with diabetes in 1982, started on Metformin and started on insulin in 1993 (was seeen at [facility])     Family hx of diabetes: NIGHTLY, Disp: 90 tablet, Rfl: 3  •  tamsulosin (FLOMAX) cap, TAKE 1 CAPSULE DAILY AS DIRECTED, Disp: 90 capsule, Rfl: 3  •  ATORVASTATIN 40 MG Oral Tab, TAKE 1 TABLET NIGHTLY, Disp: 90 tablet, Rfl: 3  •  hydrocortisone (ANUSOL-HC) 2.5 % Rectal Cream, Plac Rfl: 3  •  Insulin Pen Needle (BD PEN NEEDLE SHORT U/F) 31G X 8 MM Does not apply Misc, Check blood sugar daily, Disp: 100 each, Rfl: 3  •  WESTON MICROLET LANCETS Does not apply Misc, Test 3 times daily, Disp: 300 each, Rfl: 3  •  FREESTYLE LANCETS Does no or cervical lymphadenopathy  Neck: Trachea midline: Normal  Back: no kyphosis or back tenderness  Respiratory: on wheezing  Cardiovascular:  regular rate  Gastrointestinal:  normal bowel sounds and no palpable masses in abdomen, organomegaly or tenderness discussed. Patient verbalized a complete  understanding of all of the above and did not have any further questions.            Orders Placed This Encounter      POC Finger stick glucose [93998]      Direct LDL    Balwinder Florez MD

## 2020-08-19 ENCOUNTER — NURSE ONLY (OUTPATIENT)
Dept: HEMATOLOGY/ONCOLOGY | Facility: HOSPITAL | Age: 75
End: 2020-08-19
Attending: INTERNAL MEDICINE
Payer: MEDICARE

## 2020-08-19 VITALS
OXYGEN SATURATION: 100 % | TEMPERATURE: 97 F | SYSTOLIC BLOOD PRESSURE: 153 MMHG | DIASTOLIC BLOOD PRESSURE: 72 MMHG | HEART RATE: 78 BPM | RESPIRATION RATE: 18 BRPM

## 2020-08-19 DIAGNOSIS — N18.4 ANEMIA OF CHRONIC RENAL FAILURE, STAGE 4 (SEVERE) (HCC): ICD-10-CM

## 2020-08-19 DIAGNOSIS — N18.4 CHRONIC KIDNEY DISEASE, STAGE IV (SEVERE) (HCC): Primary | ICD-10-CM

## 2020-08-19 DIAGNOSIS — N18.4 CKD (CHRONIC KIDNEY DISEASE) STAGE 4, GFR 15-29 ML/MIN (HCC): ICD-10-CM

## 2020-08-19 DIAGNOSIS — D63.1 ANEMIA OF CHRONIC RENAL FAILURE, STAGE 4 (SEVERE) (HCC): ICD-10-CM

## 2020-08-19 LAB
ALBUMIN SERPL-MCNC: 3.2 G/DL (ref 3.4–5)
ANION GAP SERPL CALC-SCNC: 7 MMOL/L (ref 0–18)
BUN BLD-MCNC: 61 MG/DL (ref 7–18)
BUN/CREAT SERPL: 20.9 (ref 10–20)
CALCIUM BLD-MCNC: 8.9 MG/DL (ref 8.5–10.1)
CHLORIDE SERPL-SCNC: 108 MMOL/L (ref 98–112)
CO2 SERPL-SCNC: 22 MMOL/L (ref 21–32)
CREAT BLD-MCNC: 2.92 MG/DL (ref 0.7–1.3)
DEPRECATED HBV CORE AB SER IA-ACNC: 684.3 NG/ML (ref 30–530)
GLUCOSE BLD-MCNC: 147 MG/DL (ref 70–99)
HCT VFR BLD AUTO: 31.7 % (ref 39–53)
HGB BLD-MCNC: 10.5 G/DL (ref 13–17.5)
IRON SATURATION: 26 % (ref 20–50)
IRON SERPL-MCNC: 71 UG/DL (ref 65–175)
OSMOLALITY SERPL CALC.SUM OF ELEC: 304 MOSM/KG (ref 275–295)
PHOSPHATE SERPL-MCNC: 3.7 MG/DL (ref 2.5–4.9)
POTASSIUM SERPL-SCNC: 5.1 MMOL/L (ref 3.5–5.1)
SODIUM SERPL-SCNC: 137 MMOL/L (ref 136–145)
TOTAL IRON BINDING CAPACITY: 270 UG/DL (ref 240–450)
TRANSFERRIN SERPL-MCNC: 181 MG/DL (ref 200–360)

## 2020-08-19 PROCEDURE — 83540 ASSAY OF IRON: CPT

## 2020-08-19 PROCEDURE — 84466 ASSAY OF TRANSFERRIN: CPT

## 2020-08-19 PROCEDURE — 85014 HEMATOCRIT: CPT

## 2020-08-19 PROCEDURE — 80069 RENAL FUNCTION PANEL: CPT

## 2020-08-19 PROCEDURE — 82728 ASSAY OF FERRITIN: CPT

## 2020-08-19 PROCEDURE — 85018 HEMOGLOBIN: CPT

## 2020-08-19 PROCEDURE — 36415 COLL VENOUS BLD VENIPUNCTURE: CPT

## 2020-08-19 PROCEDURE — 96372 THER/PROPH/DIAG INJ SC/IM: CPT

## 2020-08-19 NOTE — PROGRESS NOTES
Patient to lab for a 2 week h/h check & Q12 week iron/ ferritin along with renal panel per pt request that  ordered and possible Aranesp injection. HGB = 10.5    Patient has complaint mild fatigue, mild shortness of breath with exertion at times.   Chron

## 2020-08-20 ENCOUNTER — TELEPHONE (OUTPATIENT)
Dept: ENDOCRINOLOGY CLINIC | Facility: CLINIC | Age: 75
End: 2020-08-20

## 2020-08-20 NOTE — TELEPHONE ENCOUNTER
Pt states he needs the script for linagliptin 5 mg Oral Tab sent to [facility]. . Please advise pt states Dr wanted him to start right away and Sravan Mullen is too expensive

## 2020-09-16 ENCOUNTER — NURSE ONLY (OUTPATIENT)
Dept: HEMATOLOGY/ONCOLOGY | Facility: HOSPITAL | Age: 75
End: 2020-09-16
Attending: INTERNAL MEDICINE
Payer: MEDICARE

## 2020-09-16 DIAGNOSIS — N18.4 CHRONIC KIDNEY DISEASE, STAGE IV (SEVERE) (HCC): Primary | ICD-10-CM

## 2020-09-16 DIAGNOSIS — N18.4 ANEMIA OF CHRONIC RENAL FAILURE, STAGE 4 (SEVERE) (HCC): ICD-10-CM

## 2020-09-16 DIAGNOSIS — D63.1 ANEMIA OF CHRONIC RENAL FAILURE, STAGE 4 (SEVERE) (HCC): ICD-10-CM

## 2020-09-16 LAB
HCT VFR BLD AUTO: 29.4 % (ref 39–53)
HGB BLD-MCNC: 9.4 G/DL (ref 13–17.5)

## 2020-09-16 PROCEDURE — 85018 HEMOGLOBIN: CPT

## 2020-09-16 PROCEDURE — 36415 COLL VENOUS BLD VENIPUNCTURE: CPT

## 2020-09-16 PROCEDURE — 96372 THER/PROPH/DIAG INJ SC/IM: CPT

## 2020-09-16 PROCEDURE — 85014 HEMATOCRIT: CPT

## 2020-09-16 NOTE — PROGRESS NOTES
Patient to infusion for Aranesp injection. HGB = 9.4    Patient has complaint of fatigue, \"feels like I am running out of gas\"  Patient tolerated injection well, administered per parameters.  Patient verbalizes understanding of Aranesp injection and MISHA colindres

## 2020-10-14 ENCOUNTER — NURSE ONLY (OUTPATIENT)
Dept: HEMATOLOGY/ONCOLOGY | Facility: HOSPITAL | Age: 75
End: 2020-10-14
Attending: INTERNAL MEDICINE
Payer: MEDICARE

## 2020-10-14 VITALS
TEMPERATURE: 97 F | DIASTOLIC BLOOD PRESSURE: 59 MMHG | RESPIRATION RATE: 16 BRPM | OXYGEN SATURATION: 100 % | HEART RATE: 78 BPM | SYSTOLIC BLOOD PRESSURE: 140 MMHG

## 2020-10-14 DIAGNOSIS — N18.4 ANEMIA OF CHRONIC RENAL FAILURE, STAGE 4 (SEVERE) (HCC): ICD-10-CM

## 2020-10-14 DIAGNOSIS — N18.4 CHRONIC KIDNEY DISEASE, STAGE IV (SEVERE) (HCC): Primary | ICD-10-CM

## 2020-10-14 DIAGNOSIS — D63.1 ANEMIA OF CHRONIC RENAL FAILURE, STAGE 4 (SEVERE) (HCC): ICD-10-CM

## 2020-10-14 DIAGNOSIS — D63.1 ANEMIA OF CHRONIC RENAL FAILURE, UNSPECIFIED CKD STAGE: ICD-10-CM

## 2020-10-14 DIAGNOSIS — N18.9 ANEMIA OF CHRONIC RENAL FAILURE, UNSPECIFIED CKD STAGE: ICD-10-CM

## 2020-10-14 PROCEDURE — 96372 THER/PROPH/DIAG INJ SC/IM: CPT

## 2020-10-14 PROCEDURE — 84466 ASSAY OF TRANSFERRIN: CPT

## 2020-10-14 PROCEDURE — 85018 HEMOGLOBIN: CPT

## 2020-10-14 PROCEDURE — 85014 HEMATOCRIT: CPT

## 2020-10-14 PROCEDURE — 83540 ASSAY OF IRON: CPT

## 2020-10-14 PROCEDURE — 82728 ASSAY OF FERRITIN: CPT

## 2020-10-14 PROCEDURE — 36415 COLL VENOUS BLD VENIPUNCTURE: CPT

## 2020-10-14 NOTE — PROGRESS NOTES
Patient to lab for Q4 week lab h/h and iron/ ferritin levels, and possible Aranesp injection. Pt is not on dialysis, he has kidney disease, anemia. Sees Dr Nadeen Barajas.   HGB = 10.6    Patient has complaint mild fatigue, mild shortness of breath with exertion at

## 2020-10-29 ENCOUNTER — OFFICE VISIT (OUTPATIENT)
Dept: GASTROENTEROLOGY | Facility: CLINIC | Age: 75
End: 2020-10-29
Payer: MEDICARE

## 2020-10-29 ENCOUNTER — TELEPHONE (OUTPATIENT)
Dept: GASTROENTEROLOGY | Facility: CLINIC | Age: 75
End: 2020-10-29

## 2020-10-29 VITALS
BODY MASS INDEX: 30.39 KG/M2 | HEIGHT: 64 IN | SYSTOLIC BLOOD PRESSURE: 130 MMHG | HEART RATE: 83 BPM | DIASTOLIC BLOOD PRESSURE: 70 MMHG | TEMPERATURE: 99 F | WEIGHT: 178 LBS

## 2020-10-29 DIAGNOSIS — K62.5 RECTAL BLEEDING: ICD-10-CM

## 2020-10-29 DIAGNOSIS — K59.00 CONSTIPATION, UNSPECIFIED CONSTIPATION TYPE: Primary | ICD-10-CM

## 2020-10-29 DIAGNOSIS — K64.9 HEMORRHOIDS, UNSPECIFIED HEMORRHOID TYPE: ICD-10-CM

## 2020-10-29 PROCEDURE — 3008F BODY MASS INDEX DOCD: CPT | Performed by: INTERNAL MEDICINE

## 2020-10-29 PROCEDURE — 99203 OFFICE O/P NEW LOW 30 MIN: CPT | Performed by: INTERNAL MEDICINE

## 2020-10-29 PROCEDURE — 3075F SYST BP GE 130 - 139MM HG: CPT | Performed by: INTERNAL MEDICINE

## 2020-10-29 PROCEDURE — 3078F DIAST BP <80 MM HG: CPT | Performed by: INTERNAL MEDICINE

## 2020-10-29 RX ORDER — PEG-3350 AND ELECTROLYTES 240; 5.84; 2.98; 6.72; 22.72 G/4L; G/4L; G/4L; G/4L; G/4L
4 POWDER, FOR SOLUTION NASOGASTRIC ONCE
Qty: 1 BOTTLE | Refills: 0 | Status: SHIPPED | OUTPATIENT
Start: 2020-10-29 | End: 2020-10-29

## 2020-10-29 RX ORDER — CALCIUM POLYCARBOPHIL 625 MG 625 MG/1
625 TABLET ORAL DAILY
Qty: 30 TABLET | Refills: 3 | Status: SHIPPED | OUTPATIENT
Start: 2020-10-29

## 2020-10-29 NOTE — TELEPHONE ENCOUNTER
Diabetic order request form faxed to Dr. Marilou Arrington at:    Phone: [phone];  Fax: [phone]    Fax confirmation received

## 2020-10-29 NOTE — PATIENT INSTRUCTIONS
Constipation  - high fiber diet  - start on Fibercon tablets daily  - get plenty of fluids  - consider Miralax or Dulcolax    Hemorrhoids/rectal bleeding  - colonoscopy in Jan 2021 to confirm  - colyte prep and MAC sedation  - diabetic adjustment as per pr

## 2020-10-29 NOTE — TELEPHONE ENCOUNTER
Scheduled for:  COLONOSCOPY [id number]  Provider Name:  Dr Amy Kong  Date:  01/25/2021  Location:  [facility]  Sedation:  MAC  Time:  10:00am (pt is aware to arrive at 0900)  Prep:  Colyte  Meds/Allergies Reconciled?:  Physician reviewed  Diagnosis with codes:  Fatmata Trujillo

## 2020-10-29 NOTE — PROGRESS NOTES
Nadir Grade is a 70year old male.     HPI:   Patient presents with:  Consult: history of hemorrhoids; loose stools about 1 month ago not current    The patient is a 72-year-old male who has a history of anemia, asthma, diabetes, high blood pressure, • Diabetes Maternal Grandmother    • Diabetes Maternal Grandfather       Social History: Social History    Tobacco Use      Smoking status: Former Smoker        Packs/day: 1.00        Years: 17.00        Pack years: 16        Quit date: 1/1/1981        Y Inhalation Aerosol Inhale 2 puffs into the lungs 2 (two) times daily.  3 Inhaler 2   • spironolactone 25 MG Oral Tab      • ALLOPURINOL 100 MG Oral Tab TAKE 1 TABLET DAILY 90 tablet 4   • Glucose Blood (CONTOUR NEXT TEST) In Vitro Strip Test 3 times daily 3 nondistended  Rectal- ( Corin Nose in room as chaperone) -no external lesions noted, digital rectal exam no palpable lesions or masses no blood in the glove tip  Ext- no clubbing or cyanosis  Skin- no rashes or lesions  Neuro- appropriate response, alert, n

## 2020-11-11 ENCOUNTER — APPOINTMENT (OUTPATIENT)
Dept: HEMATOLOGY/ONCOLOGY | Facility: HOSPITAL | Age: 75
End: 2020-11-11
Attending: INTERNAL MEDICINE
Payer: MEDICARE

## 2020-11-16 RX ORDER — LINAGLIPTIN 5 MG/1
5 TABLET, FILM COATED ORAL DAILY
Qty: 90 TABLET | Refills: 0 | Status: SHIPPED | OUTPATIENT
Start: 2020-11-16 | End: 2020-11-18

## 2020-11-18 ENCOUNTER — LAB ENCOUNTER (OUTPATIENT)
Dept: LAB | Facility: HOSPITAL | Age: 75
End: 2020-11-18
Attending: INTERNAL MEDICINE
Payer: MEDICARE

## 2020-11-18 ENCOUNTER — TELEPHONE (OUTPATIENT)
Dept: ENDOCRINOLOGY CLINIC | Facility: CLINIC | Age: 75
End: 2020-11-18

## 2020-11-18 ENCOUNTER — OFFICE VISIT (OUTPATIENT)
Dept: ENDOCRINOLOGY CLINIC | Facility: CLINIC | Age: 75
End: 2020-11-18
Payer: MEDICARE

## 2020-11-18 VITALS
BODY MASS INDEX: 29.88 KG/M2 | WEIGHT: 175 LBS | DIASTOLIC BLOOD PRESSURE: 72 MMHG | HEIGHT: 64 IN | SYSTOLIC BLOOD PRESSURE: 154 MMHG | RESPIRATION RATE: 24 BRPM | HEART RATE: 80 BPM

## 2020-11-18 DIAGNOSIS — Z79.4 TYPE 2 DIABETES MELLITUS WITH HYPERGLYCEMIA, WITH LONG-TERM CURRENT USE OF INSULIN (HCC): Primary | ICD-10-CM

## 2020-11-18 DIAGNOSIS — E78.5 DYSLIPIDEMIA: ICD-10-CM

## 2020-11-18 DIAGNOSIS — E11.65 TYPE 2 DIABETES MELLITUS WITH HYPERGLYCEMIA, WITH LONG-TERM CURRENT USE OF INSULIN (HCC): Primary | ICD-10-CM

## 2020-11-18 PROCEDURE — 3078F DIAST BP <80 MM HG: CPT | Performed by: INTERNAL MEDICINE

## 2020-11-18 PROCEDURE — 36415 COLL VENOUS BLD VENIPUNCTURE: CPT

## 2020-11-18 PROCEDURE — 3077F SYST BP >= 140 MM HG: CPT | Performed by: INTERNAL MEDICINE

## 2020-11-18 PROCEDURE — 99213 OFFICE O/P EST LOW 20 MIN: CPT | Performed by: INTERNAL MEDICINE

## 2020-11-18 PROCEDURE — 83721 ASSAY OF BLOOD LIPOPROTEIN: CPT

## 2020-11-18 PROCEDURE — 3008F BODY MASS INDEX DOCD: CPT | Performed by: INTERNAL MEDICINE

## 2020-11-18 RX ORDER — LINAGLIPTIN 5 MG/1
5 TABLET, FILM COATED ORAL DAILY
Qty: 90 TABLET | Refills: 1 | Status: SHIPPED | OUTPATIENT
Start: 2020-11-18 | End: 2021-09-05

## 2020-11-18 NOTE — TELEPHONE ENCOUNTER
LDL is slightly high, but mushc improved from last time  Low fat diet and c.w statin  We will monitor  Thx

## 2020-11-18 NOTE — PROGRESS NOTES
Diabetes FU      HISTORY OF PRESENT ILLNESS   Lydia Yao is a 70year old male who presents for diabetes FU.         Diagnosed with diabetes in 1982, started on Metformin and started on insulin in 1993 (was seeen at [facility])     Family hx of diabetes: differently: Inject 32 Units into the skin nightly.  ), Disp: 45 mL, Rfl: 2  •  magnesium 250 MG Oral Tab, Take 250 mg by mouth daily. , Disp: , Rfl:   •  Cholecalciferol (VITAMIN D3) 25 MCG (1000 UT) Oral Cap, Take 1 tablet by mouth daily. , Disp: , Rfl: Disp: 90 tablet, Rfl: 4  •  gabapentin 100 MG Oral Cap, Take 1 capsule (100 mg total) by mouth 2 (two) times daily. , Disp: 60 capsule, Rfl: 1  •  WESTON MICROLET LANCETS Does not apply Misc, Test 3 times daily, Disp: 300 each, Rfl: 3  •  Darbepoetin Randell (A Surgical history:   Past Surgical History:   Procedure Laterality Date   • APPENDECTOMY     • BACK SURGERY     • CAPSULE ENDOSCOPY - INTERNAL REFERRAL     • CAUDAL EPIDURAL STEROID INJECTION N/A 9/21/2020    Performed by Francoise Biswas MD at [facility] S injection to his back in dep 2020     levemir  32-> 36  daily  linagliptin 5 mg daily    Continue to check sugars  Check before Bf and before dinner  Call with Bg in one week    2.  Hypertension / Blood pressure   BP is slightly high today  Low salt diet, F

## 2020-11-24 NOTE — TELEPHONE ENCOUNTER
Received signed order form from Dr. Daya Mullins with no orders on it. I faxed it back requesting that he complete the orders section (the adjustments he would like made) and to fax back.

## 2020-12-07 NOTE — TELEPHONE ENCOUNTER
Received written and signed order from Dr. Diego Jack insulin morning of procedure\". I contacted the patient and reviewed above instructions from Dr. Elissa Beaulieu and he voiced understanding.   I also reminded him to hold losartan morning of/night before proce

## 2020-12-09 ENCOUNTER — NURSE ONLY (OUTPATIENT)
Dept: HEMATOLOGY/ONCOLOGY | Facility: HOSPITAL | Age: 75
End: 2020-12-09
Attending: INTERNAL MEDICINE
Payer: MEDICARE

## 2020-12-09 VITALS
TEMPERATURE: 98 F | OXYGEN SATURATION: 100 % | HEART RATE: 83 BPM | DIASTOLIC BLOOD PRESSURE: 53 MMHG | RESPIRATION RATE: 20 BRPM | SYSTOLIC BLOOD PRESSURE: 159 MMHG

## 2020-12-09 DIAGNOSIS — N18.4 ANEMIA OF CHRONIC RENAL FAILURE, STAGE 4 (SEVERE) (HCC): ICD-10-CM

## 2020-12-09 DIAGNOSIS — D63.1 ANEMIA OF CHRONIC RENAL FAILURE, STAGE 4 (SEVERE) (HCC): ICD-10-CM

## 2020-12-09 DIAGNOSIS — N18.4 CHRONIC KIDNEY DISEASE, STAGE IV (SEVERE) (HCC): Primary | ICD-10-CM

## 2020-12-09 PROCEDURE — 96372 THER/PROPH/DIAG INJ SC/IM: CPT

## 2020-12-09 PROCEDURE — 85014 HEMATOCRIT: CPT

## 2020-12-09 PROCEDURE — 36415 COLL VENOUS BLD VENIPUNCTURE: CPT

## 2020-12-09 PROCEDURE — 85018 HEMOGLOBIN: CPT

## 2020-12-09 NOTE — PROGRESS NOTES
Pt to infusion for labs and Aranesp. Labs collected per order. Pt reports feeling increased fatigue. Aranesp given for Hgb 10.0. Injection given in L upper arm and tolerated well. Discharged ambulatory with next appointments scheduled.

## 2021-01-06 ENCOUNTER — NURSE ONLY (OUTPATIENT)
Dept: HEMATOLOGY/ONCOLOGY | Facility: HOSPITAL | Age: 76
End: 2021-01-06
Attending: INTERNAL MEDICINE
Payer: MEDICARE

## 2021-01-06 VITALS — HEART RATE: 80 BPM | DIASTOLIC BLOOD PRESSURE: 68 MMHG | SYSTOLIC BLOOD PRESSURE: 158 MMHG

## 2021-01-06 DIAGNOSIS — N18.4 CHRONIC KIDNEY DISEASE, STAGE IV (SEVERE) (HCC): ICD-10-CM

## 2021-01-06 LAB
ALBUMIN SERPL-MCNC: 3.2 G/DL (ref 3.4–5)
ANION GAP SERPL CALC-SCNC: 3 MMOL/L (ref 0–18)
BUN BLD-MCNC: 76 MG/DL (ref 7–18)
BUN/CREAT SERPL: 24.7 (ref 10–20)
CALCIUM BLD-MCNC: 9.3 MG/DL (ref 8.5–10.1)
CHLORIDE SERPL-SCNC: 108 MMOL/L (ref 98–112)
CO2 SERPL-SCNC: 27 MMOL/L (ref 21–32)
CREAT BLD-MCNC: 3.08 MG/DL
GLUCOSE BLD-MCNC: 150 MG/DL (ref 70–99)
OSMOLALITY SERPL CALC.SUM OF ELEC: 311 MOSM/KG (ref 275–295)
PHOSPHATE SERPL-MCNC: 3.2 MG/DL (ref 2.5–4.9)
POTASSIUM SERPL-SCNC: 4.6 MMOL/L (ref 3.5–5.1)
SODIUM SERPL-SCNC: 138 MMOL/L (ref 136–145)

## 2021-01-06 PROCEDURE — 36415 COLL VENOUS BLD VENIPUNCTURE: CPT

## 2021-01-06 PROCEDURE — 80069 RENAL FUNCTION PANEL: CPT

## 2021-01-18 ENCOUNTER — TELEPHONE (OUTPATIENT)
Dept: GASTROENTEROLOGY | Facility: CLINIC | Age: 76
End: 2021-01-18

## 2021-01-18 DIAGNOSIS — K64.9 HEMORRHOIDS, UNSPECIFIED HEMORRHOID TYPE: ICD-10-CM

## 2021-01-18 DIAGNOSIS — K59.00 CONSTIPATION, UNSPECIFIED CONSTIPATION TYPE: Primary | ICD-10-CM

## 2021-01-18 DIAGNOSIS — K62.5 RECTAL BLEEDING: ICD-10-CM

## 2021-01-18 NOTE — TELEPHONE ENCOUNTER
Rescheduled for:  Colonoscopy 72392  Provider Name:  Dr. Herminia Green  Date:  1/25/21  [facility]  Sedation:  MAC  Time:     From-1000 To-0930 (pt is aware to arrive at 0830)   Prep:  Colyte, sent new instructions via [facility]  Meds/Allergies

## 2021-01-22 ENCOUNTER — TELEPHONE (OUTPATIENT)
Dept: ENDOCRINOLOGY CLINIC | Facility: CLINIC | Age: 76
End: 2021-01-22

## 2021-01-22 ENCOUNTER — LAB ENCOUNTER (OUTPATIENT)
Dept: LAB | Facility: HOSPITAL | Age: 76
End: 2021-01-22
Attending: INTERNAL MEDICINE
Payer: MEDICARE

## 2021-01-22 DIAGNOSIS — Z01.818 PRE-OP TESTING: ICD-10-CM

## 2021-01-22 NOTE — TELEPHONE ENCOUNTER
Pt states he is having a procedure done on Monday 1-25 and he needs to know if he needs to lower his insulin or take it at all.  Please call

## 2021-01-22 NOTE — TELEPHONE ENCOUNTER
Please advise on medication adjustments for colonoscopy 1/25/21 at 9:30am.   Diet restriction:  1/24: clear liquids only   NPO at 6pm 1/24/21     Confirms medications:  Levemir 40u daily  linagliptin 5mg daily    BG levels:  1/20:   1/21:   1/2

## 2021-01-22 NOTE — TELEPHONE ENCOUNTER
Patient takes levemir in the morning. Per Dr. Maya Burciaga,   \"day before procedure: take half the amount of levemir, take linagliptin. On day of procedure If fasting BG is over 180 take 10 units of levemir, otherwise hold levemir.  Also hold linagliptin

## 2021-01-23 LAB — SARS-COV-2 RNA RESP QL NAA+PROBE: NOT DETECTED

## 2021-01-25 ENCOUNTER — HOSPITAL ENCOUNTER (OUTPATIENT)
Age: 76
Setting detail: HOSPITAL OUTPATIENT SURGERY
Discharge: HOME | End: 2021-01-25
Attending: INTERNAL MEDICINE | Admitting: INTERNAL MEDICINE
Payer: MEDICARE

## 2021-01-25 ENCOUNTER — ANESTHESIA EVENT (OUTPATIENT)
Dept: ENDOSCOPY | Age: 76
End: 2021-01-25
Payer: MEDICARE

## 2021-01-25 ENCOUNTER — ANESTHESIA (OUTPATIENT)
Dept: ENDOSCOPY | Age: 76
End: 2021-01-25
Payer: MEDICARE

## 2021-01-25 VITALS
SYSTOLIC BLOOD PRESSURE: 144 MMHG | RESPIRATION RATE: 15 BRPM | WEIGHT: 171 LBS | DIASTOLIC BLOOD PRESSURE: 71 MMHG | TEMPERATURE: 98 F | HEART RATE: 68 BPM | HEIGHT: 65 IN | BODY MASS INDEX: 28.49 KG/M2 | OXYGEN SATURATION: 99 %

## 2021-01-25 DIAGNOSIS — Z01.818 PRE-OP TESTING: Primary | ICD-10-CM

## 2021-01-25 DIAGNOSIS — K64.9 HEMORRHOIDS, UNSPECIFIED HEMORRHOID TYPE: ICD-10-CM

## 2021-01-25 DIAGNOSIS — K62.5 RECTAL BLEEDING: ICD-10-CM

## 2021-01-25 DIAGNOSIS — K59.00 CONSTIPATION, UNSPECIFIED CONSTIPATION TYPE: ICD-10-CM

## 2021-01-25 PROCEDURE — 99070 SPECIAL SUPPLIES PHYS/QHP: CPT | Performed by: INTERNAL MEDICINE

## 2021-01-25 PROCEDURE — 82962 GLUCOSE BLOOD TEST: CPT

## 2021-01-25 PROCEDURE — 45385 COLONOSCOPY W/LESION REMOVAL: CPT | Performed by: INTERNAL MEDICINE

## 2021-01-25 PROCEDURE — 88305 TISSUE EXAM BY PATHOLOGIST: CPT | Performed by: INTERNAL MEDICINE

## 2021-01-25 PROCEDURE — 45380 COLONOSCOPY AND BIOPSY: CPT | Performed by: INTERNAL MEDICINE

## 2021-01-25 RX ORDER — DEXTROSE MONOHYDRATE 25 G/50ML
50 INJECTION, SOLUTION INTRAVENOUS
Status: CANCELLED | OUTPATIENT
Start: 2021-01-25

## 2021-01-25 RX ORDER — SODIUM CHLORIDE, SODIUM LACTATE, POTASSIUM CHLORIDE, CALCIUM CHLORIDE 600; 310; 30; 20 MG/100ML; MG/100ML; MG/100ML; MG/100ML
INJECTION, SOLUTION INTRAVENOUS CONTINUOUS
Status: DISCONTINUED | OUTPATIENT
Start: 2021-01-25 | End: 2021-01-25

## 2021-01-25 RX ORDER — NALOXONE HYDROCHLORIDE 0.4 MG/ML
80 INJECTION, SOLUTION INTRAMUSCULAR; INTRAVENOUS; SUBCUTANEOUS AS NEEDED
Status: CANCELLED | OUTPATIENT
Start: 2021-01-25 | End: 2021-01-25

## 2021-01-25 RX ORDER — SODIUM CHLORIDE, SODIUM LACTATE, POTASSIUM CHLORIDE, CALCIUM CHLORIDE 600; 310; 30; 20 MG/100ML; MG/100ML; MG/100ML; MG/100ML
INJECTION, SOLUTION INTRAVENOUS CONTINUOUS
Status: CANCELLED | OUTPATIENT
Start: 2021-01-25

## 2021-01-25 RX ORDER — LIDOCAINE HYDROCHLORIDE 10 MG/ML
INJECTION, SOLUTION EPIDURAL; INFILTRATION; INTRACAUDAL; PERINEURAL AS NEEDED
Status: DISCONTINUED | OUTPATIENT
Start: 2021-01-25 | End: 2021-01-25 | Stop reason: SURG

## 2021-01-25 RX ADMIN — LIDOCAINE HYDROCHLORIDE 50 MG: 10 INJECTION, SOLUTION EPIDURAL; INFILTRATION; INTRACAUDAL; PERINEURAL at 10:01:00

## 2021-01-25 RX ADMIN — SODIUM CHLORIDE, SODIUM LACTATE, POTASSIUM CHLORIDE, CALCIUM CHLORIDE: 600; 310; 30; 20 INJECTION, SOLUTION INTRAVENOUS at 10:25:00

## 2021-01-25 NOTE — OPERATIVE REPORT
[facility] Endoscopy Report      Preoperative Diagnosis:  - rectal bleeding  - hemorrhoids    Postoperative Diagnosis:  - colon polyps x 2  - internal hemorrhoids      Procedure:    Colonoscopy     Surgeon:  Keely Gómez M.D.     Anesthesi

## 2021-01-25 NOTE — ANESTHESIA PREPROCEDURE EVALUATION
Anesthesia PreOp Note    HPI:     Millie Caruso is a 70year old male who presents for preoperative consultation requested by: Cesia Hook MD    Date of Surgery: 1/25/2021    Procedure(s):  COLONOSCOPY  Indication: Constipation, unspecified const ENDOSCOPY - INTERNAL REFERRAL     • CAUDAL EPIDURAL STEROID INJECTION N/A 9/21/2020    Performed by John Justice MD at [address] [place] [address] N/A 2/27/2019    Performed by John Justice MD at [address] [place] [address] , Rfl: , 1/24/2021    •  ALLOPURINOL 100 MG Oral Tab, TAKE 1 TABLET DAILY, Disp: 90 tablet, Rfl: 4, 1/24/2021    •  FELODIPINE ER 10 MG Oral Tablet 24 Hr, TAKE 1 TABLET DAILY, Disp: 90 tablet, Rfl: 4, 1/24/2021    •  LOSARTAN 100 MG Oral Tab, TAKE 1 TABLET , Rfl:     •  FREESTYLE LANCETS Does not apply Misc, Test tid, Disp: 300 each, Rfl: 3, 1/24/2021    •  PATANOL 0.1 % Ophthalmic Solution, , Disp: , Rfl: 3, 1/24/2021        •  lactated ringers infusion, , Intravenous, Continuous, Antonio Gautam MD, Last file      Intimate partner violence        Fear of current or ex partner: Not on file        Emotionally abused: Not on file        Physically abused: Not on file        Forced sexual activity: Not on file    Other Topics      Concerns:        Not on file planned.   Dallin Suazo  1/25/2021 9:47 AM

## 2021-01-25 NOTE — ANESTHESIA POSTPROCEDURE EVALUATION
Patient: Rk Deluca    Procedure Summary     Date: 01/25/21 Room / Location: [facility] 01 / [address] [facility] ENDO    Anesthesia Start: 1001 Anesthesia Stop: 9110    Procedure: COLONOSCOPY (N/A ) Diagnosis:       Constipation, unspecified constipation typ

## 2021-01-25 NOTE — H&P
History & Physical Examination    Patient Name: Denver Mixer  MRN: [id number]  CSN: [id number]  YOB: 1945    Diagnosis: hemorrhoids, rectal bleeding        •  PREDNISONE 5 MG Oral Tab, TAKE ONE TABLET BY MOUTH ONE TIME DAILY, Disp: 90 ta Hr, TAKE 1 TABLET DAILY, Disp: 90 tablet, Rfl: 4, 1/24/2021    •  LOSARTAN 100 MG Oral Tab, TAKE 1 TABLET DAILY, Disp: 90 tablet, Rfl: 4, 1/24/2021    •  gabapentin 100 MG Oral Cap, Take 1 capsule (100 mg total) by mouth 2 (two) times daily. , Disp: 60 caps Rfl: 3, 1/24/2021        •  lactated ringers infusion, , Intravenous, Continuous        Allergies:   Dust Mite Extract       RASH  Adhesive Tape           OTHER (SEE COMMENTS)    Comment:rash    Past Medical History:   Diagnosis Date   • Anemia    • Asthma changes noted above. Octavia Gautam  1/25/2021  9:47 AM

## 2021-01-26 LAB — GLUCOSE BLDC GLUCOMTR-MCNC: 142 MG/DL (ref 70–99)

## 2021-01-27 ENCOUNTER — TELEPHONE (OUTPATIENT)
Dept: GASTROENTEROLOGY | Facility: CLINIC | Age: 76
End: 2021-01-27

## 2021-01-27 DIAGNOSIS — Z23 NEED FOR VACCINATION: ICD-10-CM

## 2021-01-27 NOTE — TELEPHONE ENCOUNTER
----- Message from Rudolph Luu MD sent at 1/26/2021  4:21 PM CST -----  I wanted to get back to you with your colonoscopy results. You had 2 colon polyps removed which were benign.   I would advise a repeat colonoscopy in 7 years to make sure no new p

## 2021-01-27 NOTE — TELEPHONE ENCOUNTER
Health maintenance updated. 7 year colonoscopy recall entered into patient outreach in [address] [place] [address] Rd. Next colonoscopy will be due 1/25/2028.     Patient viewed below result note in MyChart:  Viewed by Nadine Joshi on 1/27/2021 12:11 PM

## 2021-02-01 PROBLEM — I50.32 CHRONIC DIASTOLIC CONGESTIVE HEART FAILURE (HCC): Status: ACTIVE | Noted: 2020-03-02

## 2021-02-01 PROBLEM — E55.9 VITAMIN D DEFICIENCY: Status: ACTIVE | Noted: 2021-02-01

## 2021-02-01 PROCEDURE — 3052F HG A1C>EQUAL 8.0%<EQUAL 9.0%: CPT | Performed by: INTERNAL MEDICINE

## 2021-02-03 ENCOUNTER — NURSE ONLY (OUTPATIENT)
Dept: HEMATOLOGY/ONCOLOGY | Facility: HOSPITAL | Age: 76
End: 2021-02-03
Attending: INTERNAL MEDICINE
Payer: MEDICARE

## 2021-02-03 VITALS
HEART RATE: 72 BPM | SYSTOLIC BLOOD PRESSURE: 138 MMHG | DIASTOLIC BLOOD PRESSURE: 60 MMHG | RESPIRATION RATE: 16 BRPM | OXYGEN SATURATION: 100 %

## 2021-02-03 DIAGNOSIS — D63.1 ANEMIA OF CHRONIC RENAL FAILURE, STAGE 4 (SEVERE) (HCC): ICD-10-CM

## 2021-02-03 DIAGNOSIS — N18.4 ANEMIA OF CHRONIC RENAL FAILURE, STAGE 4 (SEVERE) (HCC): ICD-10-CM

## 2021-02-03 DIAGNOSIS — N18.4 CHRONIC KIDNEY DISEASE, STAGE IV (SEVERE) (HCC): Primary | ICD-10-CM

## 2021-02-03 LAB
DEPRECATED HBV CORE AB SER IA-ACNC: 490.7 NG/ML
HCT VFR BLD AUTO: 30.5 %
HGB BLD-MCNC: 9.6 G/DL
IRON SATURATION: 24 %
IRON SERPL-MCNC: 69 UG/DL
TOTAL IRON BINDING CAPACITY: 282 UG/DL (ref 240–450)
TRANSFERRIN SERPL-MCNC: 189 MG/DL (ref 200–360)

## 2021-02-03 PROCEDURE — 85014 HEMATOCRIT: CPT

## 2021-02-03 PROCEDURE — 84466 ASSAY OF TRANSFERRIN: CPT

## 2021-02-03 PROCEDURE — 96372 THER/PROPH/DIAG INJ SC/IM: CPT

## 2021-02-03 PROCEDURE — 85018 HEMOGLOBIN: CPT

## 2021-02-03 PROCEDURE — 36415 COLL VENOUS BLD VENIPUNCTURE: CPT

## 2021-02-03 PROCEDURE — 83540 ASSAY OF IRON: CPT

## 2021-02-03 PROCEDURE — 82728 ASSAY OF FERRITIN: CPT

## 2021-02-03 NOTE — PROGRESS NOTES
Patient to lab for Q8 week lab h/h and iron/ ferritin levels, and possible Aranesp injection. Pt is not on dialysis, he has kidney disease, anemia. Sees Dr Tawana Sanz.   HGB = 9.6    Patient has complaint mild fatigue, mild shortness of breath with exertion at

## 2021-02-15 ENCOUNTER — IMMUNIZATION (OUTPATIENT)
Dept: LAB | Age: 76
End: 2021-02-15
Attending: HOSPITALIST
Payer: MEDICARE

## 2021-02-15 DIAGNOSIS — Z23 NEED FOR VACCINATION: Primary | ICD-10-CM

## 2021-02-15 PROCEDURE — 0001A SARSCOV2 VAC 30MCG/0.3ML IM: CPT

## 2021-03-03 ENCOUNTER — NURSE ONLY (OUTPATIENT)
Dept: HEMATOLOGY/ONCOLOGY | Facility: HOSPITAL | Age: 76
End: 2021-03-03
Attending: INTERNAL MEDICINE
Payer: MEDICARE

## 2021-03-03 VITALS
DIASTOLIC BLOOD PRESSURE: 57 MMHG | SYSTOLIC BLOOD PRESSURE: 148 MMHG | OXYGEN SATURATION: 100 % | HEART RATE: 83 BPM | RESPIRATION RATE: 16 BRPM | TEMPERATURE: 98 F

## 2021-03-03 DIAGNOSIS — N18.4 ANEMIA OF CHRONIC RENAL FAILURE, STAGE 4 (SEVERE) (HCC): ICD-10-CM

## 2021-03-03 DIAGNOSIS — D63.1 ANEMIA OF CHRONIC RENAL FAILURE, STAGE 4 (SEVERE) (HCC): ICD-10-CM

## 2021-03-03 DIAGNOSIS — N18.4 CHRONIC KIDNEY DISEASE, STAGE IV (SEVERE) (HCC): Primary | ICD-10-CM

## 2021-03-03 LAB
ALBUMIN SERPL-MCNC: 3.5 G/DL (ref 3.4–5)
ANION GAP SERPL CALC-SCNC: 4 MMOL/L (ref 0–18)
BUN BLD-MCNC: 61 MG/DL (ref 7–18)
BUN/CREAT SERPL: 21.4 (ref 10–20)
CALCIUM BLD-MCNC: 9.1 MG/DL (ref 8.5–10.1)
CHLORIDE SERPL-SCNC: 110 MMOL/L (ref 98–112)
CO2 SERPL-SCNC: 26 MMOL/L (ref 21–32)
CREAT BLD-MCNC: 2.85 MG/DL
GLUCOSE BLD-MCNC: 156 MG/DL (ref 70–99)
HCT VFR BLD AUTO: 31.5 %
HGB BLD-MCNC: 10.3 G/DL
OSMOLALITY SERPL CALC.SUM OF ELEC: 310 MOSM/KG (ref 275–295)
PHOSPHATE SERPL-MCNC: 3 MG/DL (ref 2.5–4.9)
POTASSIUM SERPL-SCNC: 4.4 MMOL/L (ref 3.5–5.1)
SODIUM SERPL-SCNC: 140 MMOL/L (ref 136–145)

## 2021-03-03 PROCEDURE — 80069 RENAL FUNCTION PANEL: CPT

## 2021-03-03 PROCEDURE — 85018 HEMOGLOBIN: CPT

## 2021-03-03 PROCEDURE — 85014 HEMATOCRIT: CPT

## 2021-03-03 PROCEDURE — 96372 THER/PROPH/DIAG INJ SC/IM: CPT

## 2021-03-03 PROCEDURE — 36415 COLL VENOUS BLD VENIPUNCTURE: CPT

## 2021-03-03 NOTE — PROGRESS NOTES
Patient to lab for Q4 week lab h/h and 1x renal panel, and possible Aranesp injection. ORDER changed to Q4 weeks with his last visit. Pt is not on dialysis, he has kidney disease, anemia. Sees Dr Allayne Pallas.   HGB = 10.3    Patient has complaint mild fatigue,

## 2021-03-08 ENCOUNTER — IMMUNIZATION (OUTPATIENT)
Dept: LAB | Age: 76
End: 2021-03-08
Attending: HOSPITALIST
Payer: MEDICARE

## 2021-03-08 DIAGNOSIS — Z23 NEED FOR VACCINATION: Primary | ICD-10-CM

## 2021-03-08 PROCEDURE — 0002A SARSCOV2 VAC 30MCG/0.3ML IM: CPT

## 2021-03-22 ENCOUNTER — OFFICE VISIT (OUTPATIENT)
Dept: ENDOCRINOLOGY CLINIC | Facility: CLINIC | Age: 76
End: 2021-03-22
Payer: MEDICARE

## 2021-03-22 VITALS
BODY MASS INDEX: 32 KG/M2 | DIASTOLIC BLOOD PRESSURE: 72 MMHG | WEIGHT: 179 LBS | SYSTOLIC BLOOD PRESSURE: 162 MMHG | HEART RATE: 94 BPM

## 2021-03-22 DIAGNOSIS — E78.5 DYSLIPIDEMIA: ICD-10-CM

## 2021-03-22 DIAGNOSIS — E11.65 TYPE 2 DIABETES MELLITUS WITH HYPERGLYCEMIA, WITH LONG-TERM CURRENT USE OF INSULIN (HCC): Primary | ICD-10-CM

## 2021-03-22 DIAGNOSIS — Z79.4 TYPE 2 DIABETES MELLITUS WITH HYPERGLYCEMIA, WITH LONG-TERM CURRENT USE OF INSULIN (HCC): Primary | ICD-10-CM

## 2021-03-22 LAB
GLUCOSE BLOOD: 216
TEST STRIP LOT #: NORMAL NUMERIC

## 2021-03-22 PROCEDURE — 3077F SYST BP >= 140 MM HG: CPT | Performed by: INTERNAL MEDICINE

## 2021-03-22 PROCEDURE — 36416 COLLJ CAPILLARY BLOOD SPEC: CPT | Performed by: INTERNAL MEDICINE

## 2021-03-22 PROCEDURE — 99213 OFFICE O/P EST LOW 20 MIN: CPT | Performed by: INTERNAL MEDICINE

## 2021-03-22 PROCEDURE — 3078F DIAST BP <80 MM HG: CPT | Performed by: INTERNAL MEDICINE

## 2021-03-22 PROCEDURE — 82947 ASSAY GLUCOSE BLOOD QUANT: CPT | Performed by: INTERNAL MEDICINE

## 2021-03-22 NOTE — PROGRESS NOTES
Diabetes FU      HISTORY OF PRESENT ILLNESS   Daryl Roman is a 70year old male who presents for diabetes FU.         Diagnosed with diabetes in 1982, started on Metformin and started on insulin in 1993 (was seeen at [facility])     Family hx of diabetes: Disp: 90 tablet, Rfl: 1  •  Calcium Polycarbophil (FIBERCON) 625 MG Oral Tab, Take 1 tablet (625 mg total) by mouth daily. , Disp: 30 tablet, Rfl: 3  •  insulin detemir (LEVEMIR FLEXTOUCH) 100 UNIT/ML Subcutaneous Solution Pen-injector, Inject 40 Units into Injection Solution, Inject into the vein as needed. , Disp: , Rfl:   •  cycloSPORINE (RESTASIS) 0.05 % Ophthalmic Emulsion, Place 2 drops into both eyes daily. , Disp: 16.5 mL, Rfl: 3  •  Albuterol Sulfate HFA (PROAIR HFA) 108 (90 BASE) MCG/ACT Inhalation   Frequency of Communication with Friends and Family:       Frequency of Social Gatherings with Friends and Family:       Attends Zoroastrianism Services:       Active Member of Clubs or Organizations:       Attends Club or Organization Meetings:       Marital lipohypertrophy  Hair & Nails:  normal scalp hair     Hematologic:  no excessive bruising  Neuro:  oriented to time, self, and place, sensory grossly intact and motor grossly intact, normal monofilament exam  Psychiatric: Normal mood and behavior   Nutriti

## 2021-03-31 ENCOUNTER — APPOINTMENT (OUTPATIENT)
Dept: HEMATOLOGY/ONCOLOGY | Facility: HOSPITAL | Age: 76
End: 2021-03-31
Attending: INTERNAL MEDICINE
Payer: MEDICARE

## 2021-04-14 ENCOUNTER — NURSE ONLY (OUTPATIENT)
Dept: HEMATOLOGY/ONCOLOGY | Facility: HOSPITAL | Age: 76
End: 2021-04-14
Attending: INTERNAL MEDICINE
Payer: MEDICARE

## 2021-04-14 VITALS
OXYGEN SATURATION: 100 % | RESPIRATION RATE: 16 BRPM | SYSTOLIC BLOOD PRESSURE: 153 MMHG | DIASTOLIC BLOOD PRESSURE: 54 MMHG | HEART RATE: 78 BPM

## 2021-04-14 DIAGNOSIS — N18.4 ANEMIA OF CHRONIC RENAL FAILURE, STAGE 4 (SEVERE) (HCC): ICD-10-CM

## 2021-04-14 DIAGNOSIS — D63.1 ANEMIA OF CHRONIC RENAL FAILURE, STAGE 4 (SEVERE) (HCC): ICD-10-CM

## 2021-04-14 DIAGNOSIS — N18.4 CHRONIC KIDNEY DISEASE, STAGE IV (SEVERE) (HCC): Primary | ICD-10-CM

## 2021-04-14 PROCEDURE — 85014 HEMATOCRIT: CPT

## 2021-04-14 PROCEDURE — 36415 COLL VENOUS BLD VENIPUNCTURE: CPT

## 2021-04-14 PROCEDURE — 96372 THER/PROPH/DIAG INJ SC/IM: CPT

## 2021-04-14 PROCEDURE — 85018 HEMOGLOBIN: CPT

## 2021-04-14 NOTE — PROGRESS NOTES
Patient to lab for Q6 week lab h/h  and possible Aranesp injection. ORDER updated on 3/5 after last visit from dr Hua is not on dialysis, he has kidney disease, anemia. Sees Dr Paulina Alarcon.     Periph lab drawn, tolerated well    HGB = 10.4    Patient has compl

## 2021-04-20 ENCOUNTER — TELEPHONE (OUTPATIENT)
Dept: ENDOCRINOLOGY CLINIC | Facility: CLINIC | Age: 76
End: 2021-04-20

## 2021-04-20 NOTE — TELEPHONE ENCOUNTER
Fax received from [address] [place] [address] Requesting provider reviews diabetic shoe recommendations. Placed on MD desk for review and signature. Will fax back once signature is received.

## 2021-04-21 PROBLEM — N40.0 BENIGN PROSTATIC HYPERPLASIA, UNSPECIFIED WHETHER LOWER URINARY TRACT SYMPTOMS PRESENT: Status: ACTIVE | Noted: 2021-04-21

## 2021-04-28 ENCOUNTER — APPOINTMENT (OUTPATIENT)
Dept: HEMATOLOGY/ONCOLOGY | Facility: HOSPITAL | Age: 76
End: 2021-04-28
Attending: INTERNAL MEDICINE
Payer: MEDICARE

## 2021-05-26 ENCOUNTER — NURSE ONLY (OUTPATIENT)
Dept: HEMATOLOGY/ONCOLOGY | Facility: HOSPITAL | Age: 76
End: 2021-05-26
Attending: INTERNAL MEDICINE
Payer: MEDICARE

## 2021-05-26 VITALS
HEART RATE: 78 BPM | SYSTOLIC BLOOD PRESSURE: 104 MMHG | TEMPERATURE: 98 F | RESPIRATION RATE: 16 BRPM | DIASTOLIC BLOOD PRESSURE: 44 MMHG

## 2021-05-26 DIAGNOSIS — N18.4 CHRONIC KIDNEY DISEASE, STAGE IV (SEVERE) (HCC): Primary | ICD-10-CM

## 2021-05-26 DIAGNOSIS — N18.4 ANEMIA OF CHRONIC RENAL FAILURE, STAGE 4 (SEVERE) (HCC): ICD-10-CM

## 2021-05-26 DIAGNOSIS — D63.1 ANEMIA OF CHRONIC RENAL FAILURE, STAGE 4 (SEVERE) (HCC): ICD-10-CM

## 2021-05-26 PROCEDURE — 82728 ASSAY OF FERRITIN: CPT

## 2021-05-26 PROCEDURE — 84466 ASSAY OF TRANSFERRIN: CPT

## 2021-05-26 PROCEDURE — 96372 THER/PROPH/DIAG INJ SC/IM: CPT

## 2021-05-26 PROCEDURE — 36415 COLL VENOUS BLD VENIPUNCTURE: CPT

## 2021-05-26 PROCEDURE — 85014 HEMATOCRIT: CPT

## 2021-05-26 PROCEDURE — 83540 ASSAY OF IRON: CPT

## 2021-05-26 PROCEDURE — 85018 HEMOGLOBIN: CPT

## 2021-05-26 NOTE — PROGRESS NOTES
Patient to infusion for Aranesp injection. Lab Results   Component Value Date    HGB 9.1 (L) 05/26/2021    HCT 29.4 (L) 05/26/2021      Reports he is fatigued today. Aranesp given in left upper arm, patient tolerated well.  Discharged ambulating independen

## 2021-06-22 ENCOUNTER — LAB ENCOUNTER (OUTPATIENT)
Dept: LAB | Facility: HOSPITAL | Age: 76
End: 2021-06-22
Attending: INTERNAL MEDICINE
Payer: MEDICARE

## 2021-06-22 ENCOUNTER — OFFICE VISIT (OUTPATIENT)
Dept: ENDOCRINOLOGY CLINIC | Facility: CLINIC | Age: 76
End: 2021-06-22
Payer: MEDICARE

## 2021-06-22 VITALS
HEART RATE: 83 BPM | SYSTOLIC BLOOD PRESSURE: 153 MMHG | WEIGHT: 177 LBS | DIASTOLIC BLOOD PRESSURE: 69 MMHG | BODY MASS INDEX: 32 KG/M2

## 2021-06-22 DIAGNOSIS — Z79.4 TYPE 2 DIABETES MELLITUS WITH HYPERGLYCEMIA, WITH LONG-TERM CURRENT USE OF INSULIN (HCC): Primary | ICD-10-CM

## 2021-06-22 DIAGNOSIS — N18.9 ANEMIA OF CHRONIC RENAL FAILURE, UNSPECIFIED CKD STAGE: ICD-10-CM

## 2021-06-22 DIAGNOSIS — N18.4 CHRONIC KIDNEY DISEASE, STAGE IV (SEVERE) (HCC): ICD-10-CM

## 2021-06-22 DIAGNOSIS — D47.2 NEUROPATHY ASSOCIATED WITH MGUS (HCC): ICD-10-CM

## 2021-06-22 DIAGNOSIS — D63.1 ANEMIA OF CHRONIC RENAL FAILURE, UNSPECIFIED CKD STAGE: ICD-10-CM

## 2021-06-22 DIAGNOSIS — G63 NEUROPATHY ASSOCIATED WITH MGUS (HCC): ICD-10-CM

## 2021-06-22 DIAGNOSIS — E78.5 DYSLIPIDEMIA: ICD-10-CM

## 2021-06-22 DIAGNOSIS — E11.65 TYPE 2 DIABETES MELLITUS WITH HYPERGLYCEMIA, WITH LONG-TERM CURRENT USE OF INSULIN (HCC): Primary | ICD-10-CM

## 2021-06-22 DIAGNOSIS — N18.4 ANEMIA OF CHRONIC RENAL FAILURE, STAGE 4 (SEVERE) (HCC): ICD-10-CM

## 2021-06-22 DIAGNOSIS — D63.1 ANEMIA OF CHRONIC RENAL FAILURE, STAGE 4 (SEVERE) (HCC): ICD-10-CM

## 2021-06-22 DIAGNOSIS — N25.81 SECONDARY HYPERPARATHYROIDISM OF RENAL ORIGIN (HCC): ICD-10-CM

## 2021-06-22 DIAGNOSIS — E11.8 DIABETIC COMPLICATION (HCC): ICD-10-CM

## 2021-06-22 PROCEDURE — 82947 ASSAY GLUCOSE BLOOD QUANT: CPT | Performed by: INTERNAL MEDICINE

## 2021-06-22 PROCEDURE — 36416 COLLJ CAPILLARY BLOOD SPEC: CPT | Performed by: INTERNAL MEDICINE

## 2021-06-22 PROCEDURE — 99213 OFFICE O/P EST LOW 20 MIN: CPT | Performed by: INTERNAL MEDICINE

## 2021-06-22 PROCEDURE — 3077F SYST BP >= 140 MM HG: CPT | Performed by: INTERNAL MEDICINE

## 2021-06-22 PROCEDURE — 3078F DIAST BP <80 MM HG: CPT | Performed by: INTERNAL MEDICINE

## 2021-06-22 PROCEDURE — 83970 ASSAY OF PARATHORMONE: CPT

## 2021-06-22 PROCEDURE — 36415 COLL VENOUS BLD VENIPUNCTURE: CPT

## 2021-06-22 PROCEDURE — 85018 HEMOGLOBIN: CPT

## 2021-06-22 PROCEDURE — 83036 HEMOGLOBIN GLYCOSYLATED A1C: CPT | Performed by: INTERNAL MEDICINE

## 2021-06-22 PROCEDURE — 85014 HEMATOCRIT: CPT

## 2021-06-22 PROCEDURE — 80069 RENAL FUNCTION PANEL: CPT

## 2021-06-22 PROCEDURE — 82728 ASSAY OF FERRITIN: CPT

## 2021-06-22 NOTE — PROGRESS NOTES
Diabetes FU      HISTORY OF PRESENT ILLNESS   Calixto Rush is a 78year old male who presents for diabetes FU.         Diagnosed with diabetes in 1982, started on Metformin and started on insulin in 1993 (was seeen at [facility] [place])     Family hx of diabetes: times daily, Disp: 300 each, Rfl: 3  •  tamsulosin (FLOMAX) cap, TAKE 1 CAPSULE DAILY AS DIRECTED, Disp: 90 capsule, Rfl: 3  •  ATORVASTATIN 40 MG Oral Tab, TAKE 1 TABLET NIGHTLY, Disp: 90 tablet, Rfl: 3  •  FELODIPINE ER 10 MG Oral Tablet 24 Hr, TAKE 1 TA ALBUMIN FREE,) 60 MCG/ML Injection Solution, Inject into the vein as needed. , Disp: , Rfl:   •  cycloSPORINE (RESTASIS) 0.05 % Ophthalmic Emulsion, Place 2 drops into both eyes daily. , Disp: 16.5 mL, Rfl: 3  •  Albuterol Sulfate HFA (PROAIR HFA) 108 (90 • BACK SURGERY     • CAPSULE ENDOSCOPY - INTERNAL REFERRAL     • COLONOSCOPY     • COLONOSCOPY N/A 1/25/2021    Procedure: COLONOSCOPY;  Surgeon: Henrietta Humphrey MD;  Location: [address] [place] [address]   • UPPER GI ENDOSCOPY,DIAGNOSIS           PHYSICAL EXAM   06/22/ statin  Discussed the potential side effects of statins including muscle and liver injury. RTC in 3 months  Call with BG as discussed. Patient verbalized a complete  understanding of all of the above and did not have any further questions.

## 2021-06-23 ENCOUNTER — TELEPHONE (OUTPATIENT)
Dept: GASTROENTEROLOGY | Facility: CLINIC | Age: 76
End: 2021-06-23

## 2021-06-24 ENCOUNTER — OFFICE VISIT (OUTPATIENT)
Dept: GASTROENTEROLOGY | Facility: CLINIC | Age: 76
End: 2021-06-24
Payer: MEDICARE

## 2021-06-24 VITALS
SYSTOLIC BLOOD PRESSURE: 138 MMHG | HEIGHT: 62 IN | WEIGHT: 175 LBS | DIASTOLIC BLOOD PRESSURE: 78 MMHG | BODY MASS INDEX: 32.2 KG/M2 | HEART RATE: 85 BPM

## 2021-06-24 DIAGNOSIS — K60.2 ANAL FISSURE: Primary | ICD-10-CM

## 2021-06-24 PROCEDURE — 3075F SYST BP GE 130 - 139MM HG: CPT | Performed by: INTERNAL MEDICINE

## 2021-06-24 PROCEDURE — 3008F BODY MASS INDEX DOCD: CPT | Performed by: INTERNAL MEDICINE

## 2021-06-24 PROCEDURE — 3078F DIAST BP <80 MM HG: CPT | Performed by: INTERNAL MEDICINE

## 2021-06-24 PROCEDURE — 99213 OFFICE O/P EST LOW 20 MIN: CPT | Performed by: INTERNAL MEDICINE

## 2021-06-24 NOTE — PROGRESS NOTES
Annie Fuentes is a 78year old male. HPI:   Patient presents with:  Consult: rectal bleeding; Stew Conner is a 62-year-old male who has a history of anemia, asthma, back problems, BPH, diabetes, hypertension, hypercholesterolemia, sleep apnea.   He re FLUTICASONE PROPIONATE 50 MCG/ACT Nasal Suspension USE 2 SPRAYS NASALLY DAILY 48 g 3   • PANTOPRAZOLE SODIUM 40 MG Oral Tab EC TAKE 1 TABLET NIGHTLY 90 tablet 3   • Glucose Blood (CONTOUR NEXT TEST) In Vitro Strip Test 3 times daily 300 each 3   • tamsulos Injection Solution Inject into the vein as needed. • cycloSPORINE (RESTASIS) 0.05 % Ophthalmic Emulsion Place 2 drops into both eyes daily.  16.5 mL 3   • Albuterol Sulfate HFA (PROAIR HFA) 108 (90 BASE) MCG/ACT Inhalation Aero Soln Inhale 2 puffs int this encounter.       Meds This Visit:  Requested Prescriptions      No prescriptions requested or ordered in this encounter       Imaging & Referrals:  None       Fredo Quiroz, [address] [place] [address] Gastroenterology

## 2021-06-28 PROBLEM — K60.2 ANAL FISSURE: Status: ACTIVE | Noted: 2021-06-28

## 2021-06-29 ENCOUNTER — LAB ENCOUNTER (OUTPATIENT)
Dept: LAB | Facility: HOSPITAL | Age: 76
End: 2021-06-29
Attending: INTERNAL MEDICINE
Payer: MEDICARE

## 2021-06-29 DIAGNOSIS — G63 NEUROPATHY ASSOCIATED WITH MGUS (HCC): ICD-10-CM

## 2021-06-29 DIAGNOSIS — N25.81 SECONDARY HYPERPARATHYROIDISM OF RENAL ORIGIN (HCC): ICD-10-CM

## 2021-06-29 DIAGNOSIS — N18.4 ANEMIA OF CHRONIC RENAL FAILURE, STAGE 4 (SEVERE) (HCC): ICD-10-CM

## 2021-06-29 DIAGNOSIS — N18.4 CKD (CHRONIC KIDNEY DISEASE) STAGE 4, GFR 15-29 ML/MIN (HCC): Primary | ICD-10-CM

## 2021-06-29 DIAGNOSIS — D63.1 ANEMIA OF CHRONIC RENAL FAILURE, UNSPECIFIED CKD STAGE: ICD-10-CM

## 2021-06-29 DIAGNOSIS — N18.4 CHRONIC KIDNEY DISEASE, STAGE IV (SEVERE) (HCC): ICD-10-CM

## 2021-06-29 DIAGNOSIS — D47.2 NEUROPATHY ASSOCIATED WITH MGUS (HCC): ICD-10-CM

## 2021-06-29 DIAGNOSIS — D63.1 ANEMIA OF CHRONIC RENAL FAILURE, STAGE 4 (SEVERE) (HCC): ICD-10-CM

## 2021-06-29 DIAGNOSIS — E11.8 DIABETIC COMPLICATION (HCC): ICD-10-CM

## 2021-06-29 DIAGNOSIS — N18.4 CKD (CHRONIC KIDNEY DISEASE) STAGE 4, GFR 15-29 ML/MIN (HCC): ICD-10-CM

## 2021-06-29 DIAGNOSIS — N18.9 ANEMIA OF CHRONIC RENAL FAILURE, UNSPECIFIED CKD STAGE: ICD-10-CM

## 2021-06-29 LAB
ALBUMIN SERPL-MCNC: 3.1 G/DL (ref 3.4–5)
ANION GAP SERPL CALC-SCNC: 7 MMOL/L (ref 0–18)
BUN BLD-MCNC: 76 MG/DL (ref 7–18)
BUN/CREAT SERPL: 24.2 (ref 10–20)
CALCIUM BLD-MCNC: 8.7 MG/DL (ref 8.5–10.1)
CHLORIDE SERPL-SCNC: 110 MMOL/L (ref 98–112)
CO2 SERPL-SCNC: 22 MMOL/L (ref 21–32)
CREAT BLD-MCNC: 3.14 MG/DL
GLUCOSE BLD-MCNC: 175 MG/DL (ref 70–99)
OSMOLALITY SERPL CALC.SUM OF ELEC: 315 MOSM/KG (ref 275–295)
PHOSPHATE SERPL-MCNC: 3.6 MG/DL (ref 2.5–4.9)
POTASSIUM SERPL-SCNC: 4.8 MMOL/L (ref 3.5–5.1)
PTH-INTACT SERPL-MCNC: 215.3 PG/ML (ref 18.5–88)
SODIUM SERPL-SCNC: 139 MMOL/L (ref 136–145)

## 2021-06-29 PROCEDURE — 36415 COLL VENOUS BLD VENIPUNCTURE: CPT

## 2021-06-29 PROCEDURE — 80069 RENAL FUNCTION PANEL: CPT

## 2021-06-29 PROCEDURE — 83970 ASSAY OF PARATHORMONE: CPT

## 2021-07-07 ENCOUNTER — NURSE ONLY (OUTPATIENT)
Dept: HEMATOLOGY/ONCOLOGY | Facility: HOSPITAL | Age: 76
End: 2021-07-07
Attending: INTERNAL MEDICINE
Payer: MEDICARE

## 2021-07-07 VITALS
SYSTOLIC BLOOD PRESSURE: 140 MMHG | HEART RATE: 77 BPM | TEMPERATURE: 99 F | OXYGEN SATURATION: 100 % | RESPIRATION RATE: 16 BRPM | DIASTOLIC BLOOD PRESSURE: 52 MMHG

## 2021-07-07 DIAGNOSIS — N18.4 ANEMIA OF CHRONIC RENAL FAILURE, STAGE 4 (SEVERE) (HCC): ICD-10-CM

## 2021-07-07 DIAGNOSIS — D63.1 ANEMIA OF CHRONIC RENAL FAILURE, STAGE 4 (SEVERE) (HCC): ICD-10-CM

## 2021-07-07 DIAGNOSIS — N18.4 CHRONIC KIDNEY DISEASE, STAGE IV (SEVERE) (HCC): Primary | ICD-10-CM

## 2021-07-07 LAB
HCT VFR BLD AUTO: 30.5 %
HGB BLD-MCNC: 9.5 G/DL

## 2021-07-07 PROCEDURE — 85014 HEMATOCRIT: CPT

## 2021-07-07 PROCEDURE — 36415 COLL VENOUS BLD VENIPUNCTURE: CPT

## 2021-07-07 PROCEDURE — 96372 THER/PROPH/DIAG INJ SC/IM: CPT

## 2021-07-07 PROCEDURE — 85018 HEMOGLOBIN: CPT

## 2021-07-07 NOTE — PROGRESS NOTES
Patient to lab for Q4 week lab h/h  and possible Aranesp injection. ORDER updated on 5/26 after last visit from dr Hua is not on dialysis, he has kidney disease, anemia. Sees Dr Pauline Ruiz.     Periph lab drawn, tolerated well    HGB = 9.5    Patient has compl

## 2021-08-04 ENCOUNTER — NURSE ONLY (OUTPATIENT)
Dept: HEMATOLOGY/ONCOLOGY | Facility: HOSPITAL | Age: 76
End: 2021-08-04
Attending: INTERNAL MEDICINE
Payer: MEDICARE

## 2021-08-04 DIAGNOSIS — N18.4 CHRONIC KIDNEY DISEASE, STAGE IV (SEVERE) (HCC): Primary | ICD-10-CM

## 2021-08-04 DIAGNOSIS — N18.4 ANEMIA OF CHRONIC RENAL FAILURE, STAGE 4 (SEVERE) (HCC): ICD-10-CM

## 2021-08-04 DIAGNOSIS — D63.1 ANEMIA OF CHRONIC RENAL FAILURE, STAGE 4 (SEVERE) (HCC): ICD-10-CM

## 2021-08-04 LAB
HCT VFR BLD AUTO: 31.4 %
HGB BLD-MCNC: 9.9 G/DL

## 2021-08-04 PROCEDURE — 96372 THER/PROPH/DIAG INJ SC/IM: CPT

## 2021-08-04 PROCEDURE — 85018 HEMOGLOBIN: CPT

## 2021-08-04 PROCEDURE — 85014 HEMATOCRIT: CPT

## 2021-08-19 ENCOUNTER — APPOINTMENT (OUTPATIENT)
Dept: HEMATOLOGY/ONCOLOGY | Facility: HOSPITAL | Age: 76
End: 2021-08-19
Attending: INTERNAL MEDICINE
Payer: MEDICARE

## 2021-09-01 ENCOUNTER — NURSE ONLY (OUTPATIENT)
Dept: HEMATOLOGY/ONCOLOGY | Facility: HOSPITAL | Age: 76
End: 2021-09-01
Attending: INTERNAL MEDICINE
Payer: MEDICARE

## 2021-09-01 VITALS — DIASTOLIC BLOOD PRESSURE: 59 MMHG | OXYGEN SATURATION: 100 % | SYSTOLIC BLOOD PRESSURE: 151 MMHG | HEART RATE: 76 BPM

## 2021-09-01 DIAGNOSIS — N18.4 ANEMIA OF CHRONIC RENAL FAILURE, STAGE 4 (SEVERE) (HCC): ICD-10-CM

## 2021-09-01 DIAGNOSIS — N18.4 CHRONIC KIDNEY DISEASE, STAGE IV (SEVERE) (HCC): Primary | ICD-10-CM

## 2021-09-01 DIAGNOSIS — D63.1 ANEMIA OF CHRONIC RENAL FAILURE, STAGE 4 (SEVERE) (HCC): ICD-10-CM

## 2021-09-01 LAB
DEPRECATED HBV CORE AB SER IA-ACNC: 393.3 NG/ML
HCT VFR BLD AUTO: 32.9 %
HGB BLD-MCNC: 10.3 G/DL
IRON SATURATION: 27 %
IRON SERPL-MCNC: 69 UG/DL
TOTAL IRON BINDING CAPACITY: 256 UG/DL (ref 240–450)
TRANSFERRIN SERPL-MCNC: 172 MG/DL (ref 200–360)

## 2021-09-01 PROCEDURE — 96372 THER/PROPH/DIAG INJ SC/IM: CPT

## 2021-09-01 PROCEDURE — 84466 ASSAY OF TRANSFERRIN: CPT

## 2021-09-01 PROCEDURE — 36415 COLL VENOUS BLD VENIPUNCTURE: CPT

## 2021-09-01 PROCEDURE — 85018 HEMOGLOBIN: CPT

## 2021-09-01 PROCEDURE — 82728 ASSAY OF FERRITIN: CPT

## 2021-09-01 PROCEDURE — 85014 HEMATOCRIT: CPT

## 2021-09-01 PROCEDURE — 83540 ASSAY OF IRON: CPT

## 2021-09-01 NOTE — PROGRESS NOTES
Patient to infusion for Aranesp injection. HGB = 10.3    Patient states I have been feeling better-less tired. Patient tolerated injection well, administered per parameters.  Patient verbalizes understanding of Aranesp injection and HH monitoring prior to

## 2021-09-05 RX ORDER — LINAGLIPTIN 5 MG/1
5 TABLET, FILM COATED ORAL DAILY
Qty: 90 TABLET | Refills: 1 | Status: SHIPPED | OUTPATIENT
Start: 2021-09-05 | End: 2022-03-04

## 2021-09-07 ENCOUNTER — TELEPHONE (OUTPATIENT)
Dept: HEMATOLOGY/ONCOLOGY | Facility: HOSPITAL | Age: 76
End: 2021-09-07

## 2021-09-07 NOTE — TELEPHONE ENCOUNTER
Aranesp order received on 9/2 needs to be clarified. Two scripts received with different dosages and frequency. Order faxed back to MD office for review. Pt currently scheduled next on 9/29.

## 2021-09-29 ENCOUNTER — NURSE ONLY (OUTPATIENT)
Dept: HEMATOLOGY/ONCOLOGY | Facility: HOSPITAL | Age: 76
End: 2021-09-29
Attending: INTERNAL MEDICINE
Payer: MEDICARE

## 2021-09-29 VITALS — HEART RATE: 74 BPM | SYSTOLIC BLOOD PRESSURE: 145 MMHG | RESPIRATION RATE: 16 BRPM | DIASTOLIC BLOOD PRESSURE: 59 MMHG

## 2021-09-29 DIAGNOSIS — N18.4 CHRONIC KIDNEY DISEASE, STAGE IV (SEVERE) (HCC): Primary | ICD-10-CM

## 2021-09-29 DIAGNOSIS — N18.4 ANEMIA OF CHRONIC RENAL FAILURE, STAGE 4 (SEVERE) (HCC): ICD-10-CM

## 2021-09-29 DIAGNOSIS — D63.1 ANEMIA OF CHRONIC RENAL FAILURE, STAGE 4 (SEVERE) (HCC): ICD-10-CM

## 2021-09-29 LAB
HCT VFR BLD AUTO: 34.2 %
HGB BLD-MCNC: 10.8 G/DL

## 2021-09-29 PROCEDURE — 85018 HEMOGLOBIN: CPT

## 2021-09-29 PROCEDURE — 36415 COLL VENOUS BLD VENIPUNCTURE: CPT

## 2021-09-29 PROCEDURE — 96372 THER/PROPH/DIAG INJ SC/IM: CPT

## 2021-09-29 PROCEDURE — 85014 HEMATOCRIT: CPT

## 2021-09-29 NOTE — PROGRESS NOTES
Patient to infusion for Aranesp injection. CBC drawn. HGB = 10.8  Aranesp to be given under 11. Aranesp 40 mcg given left upper arm and tolerated well. Patient states he has been feeling well. Discharged home with future appointments.     Care Plan  Pr

## 2021-10-01 ENCOUNTER — TELEPHONE (OUTPATIENT)
Dept: HEMATOLOGY/ONCOLOGY | Facility: HOSPITAL | Age: 76
End: 2021-10-01

## 2021-10-01 NOTE — TELEPHONE ENCOUNTER
I called Ronda Gomez to notify him that Dr. Inna Neves updated his order to every 6 weeks. He did not answer so I left a voicemail asking for him to call back and schedule.

## 2021-10-20 PROCEDURE — 3051F HG A1C>EQUAL 7.0%<8.0%: CPT | Performed by: INTERNAL MEDICINE

## 2021-10-26 ENCOUNTER — LAB ENCOUNTER (OUTPATIENT)
Dept: LAB | Facility: HOSPITAL | Age: 76
End: 2021-10-26
Attending: INTERNAL MEDICINE
Payer: MEDICARE

## 2021-10-26 ENCOUNTER — TELEPHONE (OUTPATIENT)
Dept: ENDOCRINOLOGY CLINIC | Facility: CLINIC | Age: 76
End: 2021-10-26

## 2021-10-26 ENCOUNTER — OFFICE VISIT (OUTPATIENT)
Dept: ENDOCRINOLOGY CLINIC | Facility: CLINIC | Age: 76
End: 2021-10-26
Payer: MEDICARE

## 2021-10-26 VITALS
HEART RATE: 83 BPM | SYSTOLIC BLOOD PRESSURE: 143 MMHG | WEIGHT: 180 LBS | HEIGHT: 64 IN | DIASTOLIC BLOOD PRESSURE: 62 MMHG | BODY MASS INDEX: 30.73 KG/M2

## 2021-10-26 DIAGNOSIS — D63.1 ANEMIA OF CHRONIC RENAL FAILURE, UNSPECIFIED CKD STAGE: ICD-10-CM

## 2021-10-26 DIAGNOSIS — D63.1 ANEMIA OF CHRONIC RENAL FAILURE, STAGE 4 (SEVERE) (HCC): ICD-10-CM

## 2021-10-26 DIAGNOSIS — N18.4 ANEMIA OF CHRONIC RENAL FAILURE, STAGE 4 (SEVERE) (HCC): ICD-10-CM

## 2021-10-26 DIAGNOSIS — N18.9 ANEMIA OF CHRONIC RENAL FAILURE, UNSPECIFIED CKD STAGE: ICD-10-CM

## 2021-10-26 DIAGNOSIS — D47.2 NEUROPATHY ASSOCIATED WITH MGUS (HCC): ICD-10-CM

## 2021-10-26 DIAGNOSIS — E11.65 TYPE 2 DIABETES MELLITUS WITH HYPERGLYCEMIA, WITH LONG-TERM CURRENT USE OF INSULIN (HCC): Primary | ICD-10-CM

## 2021-10-26 DIAGNOSIS — N18.4 CHRONIC KIDNEY DISEASE, STAGE IV (SEVERE) (HCC): ICD-10-CM

## 2021-10-26 DIAGNOSIS — G63 NEUROPATHY ASSOCIATED WITH MGUS (HCC): ICD-10-CM

## 2021-10-26 DIAGNOSIS — E78.5 DYSLIPIDEMIA: ICD-10-CM

## 2021-10-26 DIAGNOSIS — E11.8 DIABETIC COMPLICATION (HCC): ICD-10-CM

## 2021-10-26 DIAGNOSIS — Z79.4 TYPE 2 DIABETES MELLITUS WITH HYPERGLYCEMIA, WITH LONG-TERM CURRENT USE OF INSULIN (HCC): Primary | ICD-10-CM

## 2021-10-26 PROCEDURE — 36415 COLL VENOUS BLD VENIPUNCTURE: CPT

## 2021-10-26 PROCEDURE — 84466 ASSAY OF TRANSFERRIN: CPT

## 2021-10-26 PROCEDURE — 3008F BODY MASS INDEX DOCD: CPT | Performed by: INTERNAL MEDICINE

## 2021-10-26 PROCEDURE — 83970 ASSAY OF PARATHORMONE: CPT

## 2021-10-26 PROCEDURE — 80069 RENAL FUNCTION PANEL: CPT

## 2021-10-26 PROCEDURE — 3078F DIAST BP <80 MM HG: CPT | Performed by: INTERNAL MEDICINE

## 2021-10-26 PROCEDURE — 82728 ASSAY OF FERRITIN: CPT

## 2021-10-26 PROCEDURE — 99213 OFFICE O/P EST LOW 20 MIN: CPT | Performed by: INTERNAL MEDICINE

## 2021-10-26 PROCEDURE — 83036 HEMOGLOBIN GLYCOSYLATED A1C: CPT | Performed by: INTERNAL MEDICINE

## 2021-10-26 PROCEDURE — 85018 HEMOGLOBIN: CPT

## 2021-10-26 PROCEDURE — 36416 COLLJ CAPILLARY BLOOD SPEC: CPT | Performed by: INTERNAL MEDICINE

## 2021-10-26 PROCEDURE — 3077F SYST BP >= 140 MM HG: CPT | Performed by: INTERNAL MEDICINE

## 2021-10-26 PROCEDURE — 83540 ASSAY OF IRON: CPT

## 2021-10-26 PROCEDURE — 82947 ASSAY GLUCOSE BLOOD QUANT: CPT | Performed by: INTERNAL MEDICINE

## 2021-10-26 PROCEDURE — 85014 HEMATOCRIT: CPT

## 2021-10-26 PROCEDURE — 83721 ASSAY OF BLOOD LIPOPROTEIN: CPT

## 2021-10-26 NOTE — PROGRESS NOTES
Diabetes FU      HISTORY OF PRESENT ILLNESS   Sierra Sevilla is a 78year old male who presents for diabetes FU.         Diagnosed with diabetes in 1982, started on Metformin and started on insulin in 1993 (was seeen at [facility])     Family hx of diabetes: mL, Rfl: 1  •  linagliptin (TRADJENTA) 5 mg Oral Tab, Take 1 tablet (5 mg total) by mouth daily. , Disp: 90 tablet, Rfl: 1  •  SYMBICORT 160-4.5 MCG/ACT Inhalation Aerosol, USE 2 INHALATIONS TWICE A DAY, Disp: 30.6 g, Rfl: 3  •  Nifedipine Ointment 0.2% in mouth 2 (two) times daily. , Disp: 60 capsule, Rfl: 1  •  WESTON MICROLET LANCETS Does not apply Misc, Test 3 times daily, Disp: 300 each, Rfl: 3  •  Darbepoetin Randell 60 MCG/ML Injection Solution, Inject into the vein as needed.   , Disp: , Rfl:   •  cycloSPO 1/25/2021    Procedure: COLONOSCOPY;  Surgeon: Sylvia Norris MD;  Location: [facility] ENDO   • UPPER GI ENDOSCOPY,DIAGNOSIS           PHYSICAL EXAM   10/26/21  1015   BP: 123/56   Pulse: 83   Weight: 180 lb (81.6 kg)   Height: 5' 4\" (1.626 m) compliance  Repeat d LDL  Discussed the potential side effects of statins including muscle and liver injury. RTC in 3 months  Call with BG as discussed.      Patient verbalized a complete  understanding of all of the above and did not have any furt

## 2021-10-27 ENCOUNTER — APPOINTMENT (OUTPATIENT)
Dept: HEMATOLOGY/ONCOLOGY | Facility: HOSPITAL | Age: 76
End: 2021-10-27
Attending: INTERNAL MEDICINE
Payer: MEDICARE

## 2021-10-28 NOTE — TELEPHONE ENCOUNTER
Dr. Julio Saldana -- CHRISTIN    Spoke to patient and states he was off the statin a while back because he had complications.   Pt refused to give details as to what complications and stated he had a lot going on and does not want to go into details, but he reports

## 2021-11-10 ENCOUNTER — NURSE ONLY (OUTPATIENT)
Dept: HEMATOLOGY/ONCOLOGY | Facility: HOSPITAL | Age: 76
End: 2021-11-10
Attending: INTERNAL MEDICINE
Payer: MEDICARE

## 2021-11-10 VITALS
RESPIRATION RATE: 16 BRPM | HEART RATE: 72 BPM | DIASTOLIC BLOOD PRESSURE: 51 MMHG | TEMPERATURE: 98 F | SYSTOLIC BLOOD PRESSURE: 129 MMHG

## 2021-11-10 DIAGNOSIS — N18.4 CHRONIC KIDNEY DISEASE, STAGE IV (SEVERE) (HCC): Primary | ICD-10-CM

## 2021-11-10 DIAGNOSIS — D63.1 ANEMIA OF CHRONIC RENAL FAILURE, STAGE 4 (SEVERE) (HCC): ICD-10-CM

## 2021-11-10 DIAGNOSIS — N18.4 ANEMIA OF CHRONIC RENAL FAILURE, STAGE 4 (SEVERE) (HCC): ICD-10-CM

## 2021-11-10 PROCEDURE — 36415 COLL VENOUS BLD VENIPUNCTURE: CPT

## 2021-11-10 PROCEDURE — 96372 THER/PROPH/DIAG INJ SC/IM: CPT

## 2021-11-10 PROCEDURE — 85014 HEMATOCRIT: CPT

## 2021-11-10 PROCEDURE — 85018 HEMOGLOBIN: CPT

## 2021-11-10 NOTE — PROGRESS NOTES
Patient to infusion for Aranesp injection. Lab Results   Component Value Date    HGB 9.9 (L) 11/10/2021    HCT 31.7 (L) 11/10/2021      Patient reports he is okay. Denies any complaints. Labs collected right [facility]. Per order.  Aranesp given in left upper

## 2021-11-14 PROBLEM — R35.0 URINARY FREQUENCY: Status: ACTIVE | Noted: 2021-11-14

## 2021-11-14 PROBLEM — J44.89 CHRONIC OBSTRUCTIVE ASTHMA (HCC): Status: ACTIVE | Noted: 2021-11-14

## 2021-11-14 PROBLEM — J44.89 CHRONIC OBSTRUCTIVE ASTHMA: Status: ACTIVE | Noted: 2021-11-14

## 2021-11-14 PROBLEM — J44.9 CHRONIC OBSTRUCTIVE ASTHMA (HCC): Status: ACTIVE | Noted: 2021-11-14

## 2021-11-14 PROBLEM — R35.1 NOCTURIA: Status: ACTIVE | Noted: 2021-11-14

## 2021-11-14 PROBLEM — J44.9 CHRONIC OBSTRUCTIVE ASTHMA: Status: ACTIVE | Noted: 2021-11-14

## 2021-12-16 ENCOUNTER — ANESTHESIA EVENT (OUTPATIENT)
Dept: SURGERY | Facility: HOSPITAL | Age: 76
End: 2021-12-16
Payer: MEDICARE

## 2021-12-18 ENCOUNTER — LAB ENCOUNTER (OUTPATIENT)
Dept: LAB | Facility: HOSPITAL | Age: 76
End: 2021-12-18
Attending: OPHTHALMOLOGY
Payer: MEDICARE

## 2021-12-18 DIAGNOSIS — H25.9 AGE-RELATED CATARACT OF LEFT EYE: ICD-10-CM

## 2021-12-21 ENCOUNTER — ANESTHESIA (OUTPATIENT)
Dept: SURGERY | Facility: HOSPITAL | Age: 76
End: 2021-12-21
Payer: MEDICARE

## 2021-12-21 ENCOUNTER — HOSPITAL ENCOUNTER (OUTPATIENT)
Facility: HOSPITAL | Age: 76
Setting detail: HOSPITAL OUTPATIENT SURGERY
Discharge: HOME | End: 2021-12-21
Attending: OPHTHALMOLOGY | Admitting: OPHTHALMOLOGY
Payer: MEDICARE

## 2021-12-21 VITALS
RESPIRATION RATE: 16 BRPM | HEART RATE: 62 BPM | TEMPERATURE: 97 F | DIASTOLIC BLOOD PRESSURE: 80 MMHG | WEIGHT: 181.19 LBS | BODY MASS INDEX: 30.93 KG/M2 | SYSTOLIC BLOOD PRESSURE: 124 MMHG | OXYGEN SATURATION: 100 % | HEIGHT: 64 IN

## 2021-12-21 DIAGNOSIS — H25.811 COMBINED FORMS OF AGE-RELATED CATARACT OF RIGHT EYE: ICD-10-CM

## 2021-12-21 DIAGNOSIS — H25.9 AGE-RELATED CATARACT OF LEFT EYE: Primary | ICD-10-CM

## 2021-12-21 PROCEDURE — 82962 GLUCOSE BLOOD TEST: CPT

## 2021-12-21 PROCEDURE — 08RK3JZ REPLACEMENT OF LEFT LENS WITH SYNTHETIC SUBSTITUTE, PERCUTANEOUS APPROACH: ICD-10-PCS | Performed by: OPHTHALMOLOGY

## 2021-12-21 DEVICE — TECNIS SMPLCTY TECNIS 1PC CLR MONO 20.5D
Type: IMPLANTABLE DEVICE | Site: EYE | Status: FUNCTIONAL
Brand: TECNIS SIMPLICITY

## 2021-12-21 RX ORDER — DEXTROSE MONOHYDRATE 25 G/50ML
50 INJECTION, SOLUTION INTRAVENOUS
Status: DISCONTINUED | OUTPATIENT
Start: 2021-12-21 | End: 2021-12-21 | Stop reason: HOSPADM

## 2021-12-21 RX ORDER — PHENYLEPHRINE HCL 2.5 %
1 DROPS OPHTHALMIC (EYE) SEE ADMIN INSTRUCTIONS
Status: COMPLETED | OUTPATIENT
Start: 2021-12-21 | End: 2021-12-21

## 2021-12-21 RX ORDER — SODIUM CHLORIDE, SODIUM LACTATE, POTASSIUM CHLORIDE, CALCIUM CHLORIDE 600; 310; 30; 20 MG/100ML; MG/100ML; MG/100ML; MG/100ML
INJECTION, SOLUTION INTRAVENOUS CONTINUOUS
Status: DISCONTINUED | OUTPATIENT
Start: 2021-12-21 | End: 2021-12-21

## 2021-12-21 RX ORDER — NALOXONE HYDROCHLORIDE 0.4 MG/ML
80 INJECTION, SOLUTION INTRAMUSCULAR; INTRAVENOUS; SUBCUTANEOUS AS NEEDED
Status: DISCONTINUED | OUTPATIENT
Start: 2021-12-21 | End: 2021-12-21

## 2021-12-21 RX ORDER — ACETAMINOPHEN 500 MG
1000 TABLET ORAL ONCE
Status: DISCONTINUED | OUTPATIENT
Start: 2021-12-21 | End: 2021-12-21

## 2021-12-21 RX ORDER — MOXIFLOXACIN 5 MG/ML
SOLUTION/ DROPS OPHTHALMIC AS NEEDED
Status: DISCONTINUED | OUTPATIENT
Start: 2021-12-21 | End: 2021-12-21 | Stop reason: HOSPADM

## 2021-12-21 RX ORDER — LIDOCAINE HYDROCHLORIDE 10 MG/ML
INJECTION, SOLUTION EPIDURAL; INFILTRATION; INTRACAUDAL; PERINEURAL AS NEEDED
Status: DISCONTINUED | OUTPATIENT
Start: 2021-12-21 | End: 2021-12-21 | Stop reason: HOSPADM

## 2021-12-21 RX ORDER — TETRACAINE HYDROCHLORIDE 5 MG/ML
SOLUTION OPHTHALMIC AS NEEDED
Status: DISCONTINUED | OUTPATIENT
Start: 2021-12-21 | End: 2021-12-21 | Stop reason: HOSPADM

## 2021-12-21 RX ORDER — CYCLOPENTOLATE HYDROCHLORIDE 10 MG/ML
1 SOLUTION/ DROPS OPHTHALMIC SEE ADMIN INSTRUCTIONS
Status: COMPLETED | OUTPATIENT
Start: 2021-12-21 | End: 2021-12-21

## 2021-12-21 RX ORDER — BALANCED SALT SOLUTION 6.4; .75; .48; .3; 3.9; 1.7 MG/ML; MG/ML; MG/ML; MG/ML; MG/ML; MG/ML
SOLUTION OPHTHALMIC AS NEEDED
Status: DISCONTINUED | OUTPATIENT
Start: 2021-12-21 | End: 2021-12-21 | Stop reason: HOSPADM

## 2021-12-21 RX ORDER — MIDAZOLAM HYDROCHLORIDE 1 MG/ML
INJECTION INTRAMUSCULAR; INTRAVENOUS AS NEEDED
Status: DISCONTINUED | OUTPATIENT
Start: 2021-12-21 | End: 2021-12-21 | Stop reason: SURG

## 2021-12-21 RX ORDER — ACETAMINOPHEN 500 MG
500 TABLET ORAL ONCE AS NEEDED
Status: CANCELLED | OUTPATIENT
Start: 2021-12-21 | End: 2021-12-21

## 2021-12-21 RX ORDER — TROPICAMIDE 10 MG/ML
1 SOLUTION/ DROPS OPHTHALMIC SEE ADMIN INSTRUCTIONS
Status: COMPLETED | OUTPATIENT
Start: 2021-12-21 | End: 2021-12-21

## 2021-12-21 RX ORDER — ACETAMINOPHEN 500 MG
1000 TABLET ORAL ONCE
COMMUNITY
End: 2021-12-21 | Stop reason: ALTCHOICE

## 2021-12-21 RX ORDER — TETRACAINE HYDROCHLORIDE 5 MG/ML
1 SOLUTION OPHTHALMIC SEE ADMIN INSTRUCTIONS
Status: COMPLETED | OUTPATIENT
Start: 2021-12-21 | End: 2021-12-21

## 2021-12-21 RX ORDER — HYDROMORPHONE HYDROCHLORIDE 1 MG/ML
0.4 INJECTION, SOLUTION INTRAMUSCULAR; INTRAVENOUS; SUBCUTANEOUS EVERY 5 MIN PRN
Status: DISCONTINUED | OUTPATIENT
Start: 2021-12-21 | End: 2021-12-21

## 2021-12-21 RX ADMIN — MIDAZOLAM HYDROCHLORIDE 0.5 MG: 1 INJECTION INTRAMUSCULAR; INTRAVENOUS at 07:05:00

## 2021-12-21 NOTE — ANESTHESIA PREPROCEDURE EVALUATION
PRE-OP EVALUATION    Patient Name: Rosmery Mustafa    Admit Diagnosis: Combined forms of age-related cataract of right eye [H25.811]    Pre-op Diagnosis: Combined forms of age-related cataract of right eye [H25.811]    PHACOEMULSIFICATION OF THE  LEFT CA (two) times daily with meals. , Disp: 60 tablet, Rfl: 0, 12/21/2021 at 0400  insulin detemir (LEVEMIR FLEXTOUCH) 100 UNIT/ML Subcutaneous Solution Pen-injector, Inject 40 Units into the skin nightly.  (Patient taking differently: Inject 36 Units into the ski cataract surgery, starting after the surgery. Continue for a total of  8 days. , Disp: 5 mL, Rfl: 0  prednisoLONE acetate 1 % Ophthalmic Suspension, Start one drop, 4 times a day to the eye having cataract surgery. Start after the surgery. , Disp: 5 mL, Rf needed for Wheezing., Disp: 1 Inhaler, Rfl: 11, 12/18/2021  FREESTYLE LANCETS Does not apply Misc, Test tid, Disp: 300 each, Rfl: 3  aspirin 81 MG Oral Tab, Take 81 mg by mouth daily. , Disp: , Rfl: , 12/19/2021        Allergies: Adhesive Tape and Dust Mite 2.83 (H) 10/26/2021    CREATSERUM 2.78 (H) 10/20/2021     (H) 10/26/2021     (H) 10/20/2021    CA 8.7 10/26/2021             Anesthesia Plan      Present on Admission:  **None**

## 2021-12-21 NOTE — OPERATIVE REPORT
OPERATIVE REPORT    PREOPERATIVE/POSTOPERATIVE DIAGNOSIS:COMBINED AGE RELATED CATARACT FORMATION, LEFT EYE, , pupil miosis       OPERATION PERFORMED:   PHACOEMULSIFICATION WITH POSTERIOR CHAMBER       INTRAOCULAR LENS, LEFT EYE, 13033    SURGEON:      Haresh Swenson completed using Utrata forceps. Next, BSS from the syringe with a cannula tip was used to perform hydrodissection of the lens and it rotated freely. The same instrument was also used to perform hydroelineation.   Next, the phacoemulsification unit hand pi

## 2021-12-21 NOTE — H&P
[facility] Patient Status:  Hospital Outpatient Surgery    1945 MRN [id number]   Location [address] [place] [address] Attending Madalyn Hunt MD   [facility] Day # 0 PCP Lanette Cervantes MD Rfl: 2, 12/20/2021 at 2100  Trospium Chloride 20 MG Oral Tab, Take 1 tablet (20 mg total) by mouth 2 (two) times daily. , Disp: 180 tablet, Rfl: 0, 12/21/2021 at 0400  indapamide 2.5 MG Oral Tab, Take 2.5 mg by mouth every morning., Disp: , Rfl: , 12/21/202 MCG/ACT Inhalation Aerosol, USE 2 INHALATIONS TWICE A DAY (Patient taking differently: Inhale into the lungs as needed.), Disp: 30.6 g, Rfl: 3, 12/18/2021  Nifedipine Ointment 0.2% in pastibase base, Apply 1 Dose topically 3 (three) times daily. , Disp: 28 ago. He has a 17.00 pack-year smoking history. He has never used smokeless tobacco. He reports that he does not drink alcohol and does not use drugs.     Family History:  Family History   Problem Relation Age of Onset   • Cancer Father         Kidney   • Br

## 2021-12-21 NOTE — ANESTHESIA POSTPROCEDURE EVALUATION
[address] Patient Status:  Hospital Outpatient Surgery   Age/Gender 78year old male MRN [id number]   [facility] SURGERY Attending Lucie Ramos MD   Hosp Day # 0 PCP Yessenia Stanley MD       Anesthesia Post-

## 2021-12-21 NOTE — ANESTHESIA PREPROCEDURE EVALUATION
PRE-OP EVALUATION    Patient Name: Nicolas Potts    Admit Diagnosis: Combined forms of age-related cataract of right eye [H25.811]    Pre-op Diagnosis: Combined forms of age-related cataract of right eye [H25.811]    PHACOEMULSIFICATION OF THE  LEFT CA (two) times daily with meals. , Disp: 60 tablet, Rfl: 0, 12/21/2021 at 0400  insulin detemir (LEVEMIR FLEXTOUCH) 100 UNIT/ML Subcutaneous Solution Pen-injector, Inject 40 Units into the skin nightly.  (Patient taking differently: Inject 36 Units into the ski cataract surgery, starting after the surgery. Continue for a total of  8 days. , Disp: 5 mL, Rfl: 0  prednisoLONE acetate 1 % Ophthalmic Suspension, Start one drop, 4 times a day to the eye having cataract surgery. Start after the surgery. , Disp: 5 mL, Rf needed for Wheezing., Disp: 1 Inhaler, Rfl: 11, 12/18/2021  FREESTYLE LANCETS Does not apply Misc, Test tid, Disp: 300 each, Rfl: 3  aspirin 81 MG Oral Tab, Take 81 mg by mouth daily. , Disp: , Rfl: , 12/19/2021        Allergies: Adhesive Tape and Dust Mite Date     10/26/2021     10/20/2021    K 4.6 10/26/2021    K 5.33 (H) 10/20/2021     10/26/2021     10/20/2021    CO2 24.0 10/26/2021    CO2 20.1 (L) 10/20/2021    BUN 71 (H) 10/26/2021    BUN 73.0 (H) 10/20/2021    CREATSERUM 2.83 (

## 2021-12-21 NOTE — H&P (VIEW-ONLY)
[facility] Patient Status:  Hospital Outpatient Surgery    1945 MRN [id number]   Location [address] [place] [address] Attending Chen Tariq MD   [facility] Day # 0 PCP Redd Overton MD Rfl: 2, 12/20/2021 at 2100  Trospium Chloride 20 MG Oral Tab, Take 1 tablet (20 mg total) by mouth 2 (two) times daily. , Disp: 180 tablet, Rfl: 0, 12/21/2021 at 0400  indapamide 2.5 MG Oral Tab, Take 2.5 mg by mouth every morning., Disp: , Rfl: , 12/21/202 MCG/ACT Inhalation Aerosol, USE 2 INHALATIONS TWICE A DAY (Patient taking differently: Inhale into the lungs as needed.), Disp: 30.6 g, Rfl: 3, 12/18/2021  Nifedipine Ointment 0.2% in pastibase base, Apply 1 Dose topically 3 (three) times daily. , Disp: 28 ago. He has a 17.00 pack-year smoking history. He has never used smokeless tobacco. He reports that he does not drink alcohol and does not use drugs.     Family History:  Family History   Problem Relation Age of Onset   • Cancer Father         Kidney   • Br

## 2021-12-22 ENCOUNTER — NURSE ONLY (OUTPATIENT)
Dept: HEMATOLOGY/ONCOLOGY | Facility: HOSPITAL | Age: 76
End: 2021-12-22
Attending: INTERNAL MEDICINE
Payer: MEDICARE

## 2021-12-22 VITALS
HEART RATE: 68 BPM | TEMPERATURE: 98 F | DIASTOLIC BLOOD PRESSURE: 52 MMHG | OXYGEN SATURATION: 100 % | SYSTOLIC BLOOD PRESSURE: 125 MMHG

## 2021-12-22 DIAGNOSIS — D63.1 ANEMIA OF CHRONIC RENAL FAILURE, STAGE 4 (SEVERE) (HCC): ICD-10-CM

## 2021-12-22 DIAGNOSIS — N18.4 CHRONIC KIDNEY DISEASE, STAGE IV (SEVERE) (HCC): Primary | ICD-10-CM

## 2021-12-22 DIAGNOSIS — N18.4 ANEMIA OF CHRONIC RENAL FAILURE, STAGE 4 (SEVERE) (HCC): ICD-10-CM

## 2021-12-22 PROCEDURE — 85018 HEMOGLOBIN: CPT

## 2021-12-22 PROCEDURE — 36415 COLL VENOUS BLD VENIPUNCTURE: CPT

## 2021-12-22 PROCEDURE — 85014 HEMATOCRIT: CPT

## 2021-12-22 PROCEDURE — 96372 THER/PROPH/DIAG INJ SC/IM: CPT

## 2021-12-22 NOTE — PROGRESS NOTES
Patient to infusion for Aranesp injection. HGB = 9.4    Patient has complaint of fatigue at times, recovering from recent eye surgery  Patient tolerated injection well, administered per parameters.  Patient verbalizes understanding of Aranesp injection and

## 2022-01-15 ENCOUNTER — LAB ENCOUNTER (OUTPATIENT)
Dept: LAB | Facility: HOSPITAL | Age: 77
End: 2022-01-15
Attending: OPHTHALMOLOGY
Payer: MEDICARE

## 2022-01-15 ENCOUNTER — NURSE ONLY (OUTPATIENT)
Dept: LAB | Facility: HOSPITAL | Age: 77
End: 2022-01-15
Attending: OPHTHALMOLOGY
Payer: MEDICARE

## 2022-01-15 DIAGNOSIS — H25.9: ICD-10-CM

## 2022-01-15 LAB
ANION GAP SERPL CALC-SCNC: 5 MMOL/L (ref 0–18)
BUN BLD-MCNC: 64 MG/DL (ref 7–18)
BUN/CREAT SERPL: 21 (ref 10–20)
CALCIUM BLD-MCNC: 8.9 MG/DL (ref 8.5–10.1)
CHLORIDE SERPL-SCNC: 111 MMOL/L (ref 98–112)
CO2 SERPL-SCNC: 25 MMOL/L (ref 21–32)
CREAT BLD-MCNC: 3.05 MG/DL
FASTING STATUS PATIENT QL REPORTED: NO
GLUCOSE BLD-MCNC: 153 MG/DL (ref 70–99)
OSMOLALITY SERPL CALC.SUM OF ELEC: 313 MOSM/KG (ref 275–295)
POTASSIUM SERPL-SCNC: 5.3 MMOL/L (ref 3.5–5.1)
SODIUM SERPL-SCNC: 141 MMOL/L (ref 136–145)

## 2022-01-15 PROCEDURE — 36415 COLL VENOUS BLD VENIPUNCTURE: CPT

## 2022-01-15 PROCEDURE — 80048 BASIC METABOLIC PNL TOTAL CA: CPT

## 2022-01-16 LAB — SARS-COV-2 RNA RESP QL NAA+PROBE: NOT DETECTED

## 2022-01-17 ENCOUNTER — ANESTHESIA EVENT (OUTPATIENT)
Dept: SURGERY | Facility: HOSPITAL | Age: 77
End: 2022-01-17
Payer: MEDICARE

## 2022-01-18 ENCOUNTER — HOSPITAL ENCOUNTER (OUTPATIENT)
Facility: HOSPITAL | Age: 77
Setting detail: HOSPITAL OUTPATIENT SURGERY
Discharge: HOME HEALTH | End: 2022-01-18
Attending: OPHTHALMOLOGY | Admitting: OPHTHALMOLOGY
Payer: MEDICARE

## 2022-01-18 ENCOUNTER — ANESTHESIA (OUTPATIENT)
Dept: SURGERY | Facility: HOSPITAL | Age: 77
End: 2022-01-18
Payer: MEDICARE

## 2022-01-18 VITALS
HEART RATE: 66 BPM | DIASTOLIC BLOOD PRESSURE: 73 MMHG | TEMPERATURE: 98 F | OXYGEN SATURATION: 100 % | HEIGHT: 64 IN | WEIGHT: 180.75 LBS | SYSTOLIC BLOOD PRESSURE: 181 MMHG | BODY MASS INDEX: 30.86 KG/M2 | RESPIRATION RATE: 16 BRPM

## 2022-01-18 DIAGNOSIS — H25.811 COMBINED FORMS OF AGE-RELATED CATARACT OF RIGHT EYE: ICD-10-CM

## 2022-01-18 DIAGNOSIS — H25.9: Primary | ICD-10-CM

## 2022-01-18 LAB
GLUCOSE BLD-MCNC: 126 MG/DL (ref 70–99)
GLUCOSE BLD-MCNC: 152 MG/DL (ref 70–99)

## 2022-01-18 PROCEDURE — 08RJ3JZ REPLACEMENT OF RIGHT LENS WITH SYNTHETIC SUBSTITUTE, PERCUTANEOUS APPROACH: ICD-10-PCS | Performed by: OPHTHALMOLOGY

## 2022-01-18 PROCEDURE — 82962 GLUCOSE BLOOD TEST: CPT

## 2022-01-18 DEVICE — IMPLANTABLE DEVICE: Type: IMPLANTABLE DEVICE | Site: EYE | Status: FUNCTIONAL

## 2022-01-18 RX ORDER — TROPICAMIDE 10 MG/ML
SOLUTION/ DROPS OPHTHALMIC
Status: COMPLETED
Start: 2022-01-18 | End: 2022-01-18

## 2022-01-18 RX ORDER — MOXIFLOXACIN 5 MG/ML
SOLUTION/ DROPS OPHTHALMIC AS NEEDED
Status: DISCONTINUED | OUTPATIENT
Start: 2022-01-18 | End: 2022-01-18 | Stop reason: HOSPADM

## 2022-01-18 RX ORDER — TETRACAINE HYDROCHLORIDE 5 MG/ML
1 SOLUTION OPHTHALMIC SEE ADMIN INSTRUCTIONS
Status: COMPLETED | OUTPATIENT
Start: 2022-01-18 | End: 2022-01-18

## 2022-01-18 RX ORDER — CYCLOPENTOLATE HYDROCHLORIDE 10 MG/ML
1 SOLUTION/ DROPS OPHTHALMIC SEE ADMIN INSTRUCTIONS
Status: COMPLETED | OUTPATIENT
Start: 2022-01-18 | End: 2022-01-18

## 2022-01-18 RX ORDER — ONDANSETRON 2 MG/ML
4 INJECTION INTRAMUSCULAR; INTRAVENOUS AS NEEDED
OUTPATIENT
Start: 2022-01-18 | End: 2022-01-18

## 2022-01-18 RX ORDER — BALANCED SALT SOLUTION 6.4; .75; .48; .3; 3.9; 1.7 MG/ML; MG/ML; MG/ML; MG/ML; MG/ML; MG/ML
SOLUTION OPHTHALMIC AS NEEDED
Status: DISCONTINUED | OUTPATIENT
Start: 2022-01-18 | End: 2022-01-18 | Stop reason: HOSPADM

## 2022-01-18 RX ORDER — HYDROCODONE BITARTRATE AND ACETAMINOPHEN 5; 325 MG/1; MG/1
2 TABLET ORAL AS NEEDED
OUTPATIENT
Start: 2022-01-18

## 2022-01-18 RX ORDER — TETRACAINE HYDROCHLORIDE 5 MG/ML
SOLUTION OPHTHALMIC
Status: COMPLETED
Start: 2022-01-18 | End: 2022-01-18

## 2022-01-18 RX ORDER — ACETAMINOPHEN 500 MG
1000 TABLET ORAL ONCE
Status: DISCONTINUED | OUTPATIENT
Start: 2022-01-18 | End: 2022-01-18 | Stop reason: HOSPADM

## 2022-01-18 RX ORDER — PREDNISOLONE ACETATE 10 MG/ML
SUSPENSION/ DROPS OPHTHALMIC AS NEEDED
Status: DISCONTINUED | OUTPATIENT
Start: 2022-01-18 | End: 2022-01-18 | Stop reason: HOSPADM

## 2022-01-18 RX ORDER — INSULIN ASPART 100 [IU]/ML
INJECTION, SOLUTION INTRAVENOUS; SUBCUTANEOUS ONCE
OUTPATIENT
Start: 2022-01-18 | End: 2022-01-18

## 2022-01-18 RX ORDER — NALOXONE HYDROCHLORIDE 0.4 MG/ML
80 INJECTION, SOLUTION INTRAMUSCULAR; INTRAVENOUS; SUBCUTANEOUS AS NEEDED
OUTPATIENT
Start: 2022-01-18 | End: 2022-01-18

## 2022-01-18 RX ORDER — TETRACAINE HYDROCHLORIDE 5 MG/ML
SOLUTION OPHTHALMIC AS NEEDED
Status: DISCONTINUED | OUTPATIENT
Start: 2022-01-18 | End: 2022-01-18 | Stop reason: HOSPADM

## 2022-01-18 RX ORDER — SODIUM CHLORIDE, SODIUM LACTATE, POTASSIUM CHLORIDE, CALCIUM CHLORIDE 600; 310; 30; 20 MG/100ML; MG/100ML; MG/100ML; MG/100ML
INJECTION, SOLUTION INTRAVENOUS CONTINUOUS
Status: DISCONTINUED | OUTPATIENT
Start: 2022-01-18 | End: 2022-01-18

## 2022-01-18 RX ORDER — HYDROCODONE BITARTRATE AND ACETAMINOPHEN 5; 325 MG/1; MG/1
1 TABLET ORAL AS NEEDED
OUTPATIENT
Start: 2022-01-18

## 2022-01-18 RX ORDER — LIDOCAINE HYDROCHLORIDE 10 MG/ML
INJECTION, SOLUTION EPIDURAL; INFILTRATION; INTRACAUDAL; PERINEURAL AS NEEDED
Status: DISCONTINUED | OUTPATIENT
Start: 2022-01-18 | End: 2022-01-18 | Stop reason: HOSPADM

## 2022-01-18 RX ORDER — PHENYLEPHRINE HCL 2.5 %
DROPS OPHTHALMIC (EYE)
Status: COMPLETED
Start: 2022-01-18 | End: 2022-01-18

## 2022-01-18 RX ORDER — TROPICAMIDE 10 MG/ML
1 SOLUTION/ DROPS OPHTHALMIC SEE ADMIN INSTRUCTIONS
Status: COMPLETED | OUTPATIENT
Start: 2022-01-18 | End: 2022-01-18

## 2022-01-18 RX ORDER — METOCLOPRAMIDE HYDROCHLORIDE 5 MG/ML
10 INJECTION INTRAMUSCULAR; INTRAVENOUS AS NEEDED
OUTPATIENT
Start: 2022-01-18 | End: 2022-01-18

## 2022-01-18 RX ORDER — PHENYLEPHRINE HCL 2.5 %
1 DROPS OPHTHALMIC (EYE) SEE ADMIN INSTRUCTIONS
Status: COMPLETED | OUTPATIENT
Start: 2022-01-18 | End: 2022-01-18

## 2022-01-18 RX ORDER — CYCLOPENTOLATE HYDROCHLORIDE 10 MG/ML
SOLUTION/ DROPS OPHTHALMIC
Status: COMPLETED
Start: 2022-01-18 | End: 2022-01-18

## 2022-01-18 RX ORDER — DEXTROSE MONOHYDRATE 25 G/50ML
50 INJECTION, SOLUTION INTRAVENOUS
Status: DISCONTINUED | OUTPATIENT
Start: 2022-01-18 | End: 2022-01-18 | Stop reason: HOSPADM

## 2022-01-18 RX ADMIN — SODIUM CHLORIDE, SODIUM LACTATE, POTASSIUM CHLORIDE, CALCIUM CHLORIDE: 600; 310; 30; 20 INJECTION, SOLUTION INTRAVENOUS at 11:22:00

## 2022-01-18 NOTE — ANESTHESIA POSTPROCEDURE EVALUATION
[address] Patient Status:  Hospital Outpatient Surgery   Age/Gender 78year old male MRN [id number]   [facility] SURGERY Attending Pina Prasad MD   Hosp Day # 0 PCP Naif Mccrary MD       Anesthesia Post-

## 2022-01-18 NOTE — OPERATIVE REPORT
OPERATIVE REPORT    PREOPERATIVE/POSTOPERATIVE DIAGNOSIS: COMBINED AGE RELATED CATARACT FORMATION, RIGHT EYE , pupil miosis      OPERATION PERFORMED:   PHACOEMULSIFICATION WITH POSTERIOR CHAMBER       INTRAOCULAR LENS, RIGHT EYE 03462    SURGEON:      Uyen Guzman used to perform hydrodissection of the lens and it rotated freely. The same instrument was also used to perform hydroelineation. Next, the phacoemulsification unit hand piece was used with a second instrument to perform removal of the cataract.   All carson

## 2022-01-18 NOTE — ANESTHESIA PREPROCEDURE EVALUATION
PRE-OP EVALUATION    Patient Name: Jackson Vazquez    Admit Diagnosis: Combined forms of age-related cataract of right eye [H25.811]    Pre-op Diagnosis: Combined forms of age-related cataract of right eye [H25.811]    PHACOEMULSIFICATION OF THE RIGHT CA drop, Ophthalmic, See Admin Instructions  [COMPLETED] Cyclopentolate HCl (CYCLOGYL) 1 % ophthalmic solution 1 drop, 1 drop, Ophthalmic, See Admin Instructions  [COMPLETED] phenylephrine (MYDFRIN) 2.5 % ophthalmic solution 1 drop, 1 drop, Ophthalmic, See Ad Disp: , Rfl:   Albuterol Sulfate HFA (PROAIR HFA) 108 (90 BASE) MCG/ACT Inhalation Aero Soln, Inhale 2 puffs into the lungs every 4 (four) hours as needed for Wheezing., Disp: 1 Inhaler, Rfl: 11, Past Month at Unknown time  indapamide 2.5 MG Oral Tab, Take mouth daily. , Disp: , Rfl: , 1/16/2022  gabapentin 100 MG Oral Cap, Take 1 capsule (100 mg total) by mouth 2 (two) times daily. (Patient taking differently: Take 100 mg by mouth 2 (two) times daily.  Not taking right now.), Disp: 60 capsule, Rfl: 1  WESTON  standard drinks      Drug use: No     Available pre-op labs reviewed.   Lab Results   Component Value Date    WBC 6.19 10/20/2021    RBC 4.08 10/20/2021    HGB 9.4 (L) 12/22/2021    HGB 11.4 (L) 10/20/2021    HCT 29.9 (L) 12/22/2021    HCT 36.8 (L) 10/20/20

## 2022-01-18 NOTE — INTERVAL H&P NOTE
Pre-op Diagnosis: Combined forms of age-related cataract of right eye [H25.811]    The above referenced H&P was reviewed by Ivy Beaver MD on 1/18/2022, the patient was examined and no significant changes have occurred in the patient's condition since the H

## 2022-02-02 ENCOUNTER — NURSE ONLY (OUTPATIENT)
Dept: HEMATOLOGY/ONCOLOGY | Facility: HOSPITAL | Age: 77
End: 2022-02-02
Attending: INTERNAL MEDICINE
Payer: MEDICARE

## 2022-02-02 VITALS
SYSTOLIC BLOOD PRESSURE: 120 MMHG | RESPIRATION RATE: 16 BRPM | HEART RATE: 76 BPM | TEMPERATURE: 98 F | DIASTOLIC BLOOD PRESSURE: 57 MMHG

## 2022-02-02 DIAGNOSIS — N18.4 CHRONIC KIDNEY DISEASE, STAGE IV (SEVERE) (HCC): Primary | ICD-10-CM

## 2022-02-02 DIAGNOSIS — N18.4 ANEMIA OF CHRONIC RENAL FAILURE, STAGE 4 (SEVERE) (HCC): ICD-10-CM

## 2022-02-02 DIAGNOSIS — D63.1 ANEMIA OF CHRONIC RENAL FAILURE, STAGE 4 (SEVERE) (HCC): ICD-10-CM

## 2022-02-02 LAB
DEPRECATED HBV CORE AB SER IA-ACNC: 356.6 NG/ML
HCT VFR BLD AUTO: 29.3 %
HGB BLD-MCNC: 9.5 G/DL
IRON SATN MFR SERPL: 25 %
IRON SERPL-MCNC: 65 UG/DL
TIBC SERPL-MCNC: 259 UG/DL (ref 240–450)

## 2022-02-02 PROCEDURE — 84466 ASSAY OF TRANSFERRIN: CPT

## 2022-02-02 PROCEDURE — 85014 HEMATOCRIT: CPT

## 2022-02-02 PROCEDURE — 85018 HEMOGLOBIN: CPT

## 2022-02-02 PROCEDURE — 82728 ASSAY OF FERRITIN: CPT

## 2022-02-02 PROCEDURE — 36415 COLL VENOUS BLD VENIPUNCTURE: CPT

## 2022-02-02 PROCEDURE — 83540 ASSAY OF IRON: CPT

## 2022-02-02 PROCEDURE — 96372 THER/PROPH/DIAG INJ SC/IM: CPT

## 2022-02-02 NOTE — PROGRESS NOTES
Patient to infusion for Aranesp injection. Lab Results   Component Value Date    HGB 9.5 (L) 02/02/2022    HCT 29.3 (L) 02/02/2022      Per order, aranesp given in right upper arm. Patient tolerated well. Discharged ambulating independently with future appointments scheduled.

## 2022-02-10 PROBLEM — I70.213 ATHEROSCLEROSIS OF NATIVE ARTERIES OF EXTREMITIES WITH INTERMITTENT CLAUDICATION, BILATERAL LEGS: Status: ACTIVE | Noted: 2022-02-10

## 2022-02-10 PROBLEM — I70.8 AORTO-ILIAC ATHEROSCLEROSIS: Status: ACTIVE | Noted: 2022-02-10

## 2022-02-10 PROBLEM — N18.4 HYPERTENSIVE HEART AND KIDNEY DISEASE WITH CHRONIC DIASTOLIC CONGESTIVE HEART FAILURE AND STAGE 4 CHRONIC KIDNEY DISEASE (HCC): Status: ACTIVE | Noted: 2022-02-10

## 2022-02-10 PROBLEM — I13.0 HYPERTENSIVE HEART AND KIDNEY DISEASE WITH CHRONIC DIASTOLIC CONGESTIVE HEART FAILURE AND STAGE 4 CHRONIC KIDNEY DISEASE (HCC): Status: ACTIVE | Noted: 2022-02-10

## 2022-02-10 PROBLEM — N25.81 SECONDARY HYPERPARATHYROIDISM (HCC): Status: ACTIVE | Noted: 2022-02-10

## 2022-02-10 PROBLEM — I70.0 AORTIC ATHEROSCLEROSIS: Status: ACTIVE | Noted: 2022-02-10

## 2022-02-10 PROBLEM — I70.0 AORTO-ILIAC ATHEROSCLEROSIS (HCC): Status: ACTIVE | Noted: 2022-02-10

## 2022-02-10 PROBLEM — I70.8 AORTO-ILIAC ATHEROSCLEROSIS  (HCC): Status: ACTIVE | Noted: 2022-02-10

## 2022-02-10 PROBLEM — I70.0 AORTIC ATHEROSCLEROSIS (HCC): Status: ACTIVE | Noted: 2022-02-10

## 2022-02-10 PROBLEM — I70.213 ATHEROSCLEROSIS OF NATIVE ARTERIES OF EXTREMITIES WITH INTERMITTENT CLAUDICATION, BILATERAL LEGS (HCC): Status: ACTIVE | Noted: 2022-02-10

## 2022-02-10 PROBLEM — I70.8 AORTO-ILIAC ATHEROSCLEROSIS (HCC): Status: ACTIVE | Noted: 2022-02-10

## 2022-02-10 PROBLEM — I50.32 HYPERTENSIVE HEART AND KIDNEY DISEASE WITH CHRONIC DIASTOLIC CONGESTIVE HEART FAILURE AND STAGE 4 CHRONIC KIDNEY DISEASE (HCC): Status: ACTIVE | Noted: 2022-02-10

## 2022-02-10 PROBLEM — I70.0 AORTO-ILIAC ATHEROSCLEROSIS: Status: ACTIVE | Noted: 2022-02-10

## 2022-02-10 PROBLEM — I70.0 AORTO-ILIAC ATHEROSCLEROSIS  (HCC): Status: ACTIVE | Noted: 2022-02-10

## 2022-02-20 ENCOUNTER — LAB ENCOUNTER (OUTPATIENT)
Dept: LAB | Facility: HOSPITAL | Age: 77
End: 2022-02-20
Attending: INTERNAL MEDICINE
Payer: MEDICARE

## 2022-02-20 DIAGNOSIS — I12.9 HYPERTENSIVE KIDNEY DISEASE WITH CHRONIC KIDNEY DISEASE STAGE IV (HCC): ICD-10-CM

## 2022-02-20 DIAGNOSIS — D47.2 MGUS (MONOCLONAL GAMMOPATHY OF UNKNOWN SIGNIFICANCE): ICD-10-CM

## 2022-02-20 DIAGNOSIS — E78.2 MIXED HYPERLIPIDEMIA: ICD-10-CM

## 2022-02-20 DIAGNOSIS — N18.4 HYPERTENSIVE KIDNEY DISEASE WITH CHRONIC KIDNEY DISEASE STAGE IV (HCC): ICD-10-CM

## 2022-02-20 DIAGNOSIS — E87.5 HYPERKALEMIA: ICD-10-CM

## 2022-02-20 DIAGNOSIS — N18.4 CHRONIC RENAL DISEASE, STAGE IV (HCC): ICD-10-CM

## 2022-02-20 DIAGNOSIS — E11.8 TYPE II DIABETES MELLITUS WITH COMPLICATION (HCC): ICD-10-CM

## 2022-02-20 LAB
ALBUMIN SERPL-MCNC: 3.1 G/DL (ref 3.4–5)
ANION GAP SERPL CALC-SCNC: 8 MMOL/L (ref 0–18)
BUN BLD-MCNC: 73 MG/DL (ref 7–18)
BUN/CREAT SERPL: 25.8 (ref 10–20)
CALCIUM BLD-MCNC: 8.9 MG/DL (ref 8.5–10.1)
CHLORIDE SERPL-SCNC: 111 MMOL/L (ref 98–112)
CHOLEST SERPL-MCNC: 173 MG/DL (ref ?–200)
CO2 SERPL-SCNC: 23 MMOL/L (ref 21–32)
CREAT BLD-MCNC: 2.83 MG/DL
FASTING PATIENT LIPID ANSWER: YES
GLUCOSE BLD-MCNC: 205 MG/DL (ref 70–99)
HDLC SERPL-MCNC: 33 MG/DL (ref 40–59)
LDLC SERPL CALC-MCNC: 114 MG/DL (ref ?–100)
NONHDLC SERPL-MCNC: 140 MG/DL (ref ?–130)
OSMOLALITY SERPL CALC.SUM OF ELEC: 321 MOSM/KG (ref 275–295)
PHOSPHATE SERPL-MCNC: 3.6 MG/DL (ref 2.5–4.9)
POTASSIUM SERPL-SCNC: 5.8 MMOL/L (ref 3.5–5.1)
PTH-INTACT SERPL-MCNC: 201.2 PG/ML (ref 18.5–88)
SODIUM SERPL-SCNC: 142 MMOL/L (ref 136–145)
TRIGL SERPL-MCNC: 147 MG/DL (ref 30–149)
VLDLC SERPL CALC-MCNC: 25 MG/DL (ref 0–30)

## 2022-02-20 PROCEDURE — 80061 LIPID PANEL: CPT

## 2022-02-20 PROCEDURE — 83970 ASSAY OF PARATHORMONE: CPT

## 2022-02-20 PROCEDURE — 80069 RENAL FUNCTION PANEL: CPT

## 2022-02-20 PROCEDURE — 36415 COLL VENOUS BLD VENIPUNCTURE: CPT

## 2022-02-22 ENCOUNTER — OFFICE VISIT (OUTPATIENT)
Dept: ENDOCRINOLOGY CLINIC | Facility: CLINIC | Age: 77
End: 2022-02-22
Payer: MEDICARE

## 2022-02-22 VITALS
BODY MASS INDEX: 32 KG/M2 | SYSTOLIC BLOOD PRESSURE: 144 MMHG | WEIGHT: 186 LBS | DIASTOLIC BLOOD PRESSURE: 66 MMHG | HEART RATE: 70 BPM

## 2022-02-22 DIAGNOSIS — E11.42 DIABETIC POLYNEUROPATHY ASSOCIATED WITH TYPE 2 DIABETES MELLITUS (HCC): ICD-10-CM

## 2022-02-22 DIAGNOSIS — E11.69 TYPE 2 DIABETES MELLITUS WITH OTHER SPECIFIED COMPLICATION, UNSPECIFIED WHETHER LONG TERM INSULIN USE (HCC): Primary | ICD-10-CM

## 2022-02-22 DIAGNOSIS — E78.5 DYSLIPIDEMIA: ICD-10-CM

## 2022-02-22 LAB
CARTRIDGE LOT#: ABNORMAL NUMERIC
GLUCOSE BLOOD: 286
HEMOGLOBIN A1C: 7.4 % (ref 4.3–5.6)
TEST STRIP LOT #: NORMAL NUMERIC

## 2022-02-22 PROCEDURE — 3077F SYST BP >= 140 MM HG: CPT | Performed by: INTERNAL MEDICINE

## 2022-02-22 PROCEDURE — 3078F DIAST BP <80 MM HG: CPT | Performed by: INTERNAL MEDICINE

## 2022-02-22 PROCEDURE — 36416 COLLJ CAPILLARY BLOOD SPEC: CPT | Performed by: INTERNAL MEDICINE

## 2022-02-22 PROCEDURE — 99214 OFFICE O/P EST MOD 30 MIN: CPT | Performed by: INTERNAL MEDICINE

## 2022-02-22 PROCEDURE — 82947 ASSAY GLUCOSE BLOOD QUANT: CPT | Performed by: INTERNAL MEDICINE

## 2022-02-22 PROCEDURE — 83036 HEMOGLOBIN GLYCOSYLATED A1C: CPT | Performed by: INTERNAL MEDICINE

## 2022-02-28 ENCOUNTER — LAB ENCOUNTER (OUTPATIENT)
Dept: LAB | Facility: HOSPITAL | Age: 77
End: 2022-02-28
Attending: INTERNAL MEDICINE
Payer: MEDICARE

## 2022-02-28 DIAGNOSIS — N18.4 CHRONIC KIDNEY DISEASE, STAGE IV (SEVERE) (HCC): ICD-10-CM

## 2022-02-28 DIAGNOSIS — N18.9 ANEMIA OF CHRONIC RENAL FAILURE, UNSPECIFIED CKD STAGE: ICD-10-CM

## 2022-02-28 DIAGNOSIS — D63.1 ANEMIA OF CHRONIC RENAL FAILURE, UNSPECIFIED CKD STAGE: ICD-10-CM

## 2022-02-28 LAB
ALBUMIN SERPL-MCNC: 3.1 G/DL (ref 3.4–5)
ANION GAP SERPL CALC-SCNC: 6 MMOL/L (ref 0–18)
BUN BLD-MCNC: 55 MG/DL (ref 7–18)
BUN/CREAT SERPL: 18.5 (ref 10–20)
CALCIUM BLD-MCNC: 8.4 MG/DL (ref 8.5–10.1)
CHLORIDE SERPL-SCNC: 110 MMOL/L (ref 98–112)
CO2 SERPL-SCNC: 23 MMOL/L (ref 21–32)
CREAT BLD-MCNC: 2.98 MG/DL
DEPRECATED HBV CORE AB SER IA-ACNC: 297.6 NG/ML
GLUCOSE BLD-MCNC: 261 MG/DL (ref 70–99)
IRON SATN MFR SERPL: 20 %
IRON SERPL-MCNC: 53 UG/DL
OSMOLALITY SERPL CALC.SUM OF ELEC: 312 MOSM/KG (ref 275–295)
PHOSPHATE SERPL-MCNC: 3.6 MG/DL (ref 2.5–4.9)
POTASSIUM SERPL-SCNC: 4.1 MMOL/L (ref 3.5–5.1)
SODIUM SERPL-SCNC: 139 MMOL/L (ref 136–145)
TIBC SERPL-MCNC: 259 UG/DL (ref 240–450)
TRANSFERRIN SERPL-MCNC: 174 MG/DL (ref 200–360)

## 2022-02-28 PROCEDURE — 82728 ASSAY OF FERRITIN: CPT

## 2022-02-28 PROCEDURE — 83540 ASSAY OF IRON: CPT

## 2022-02-28 PROCEDURE — 36415 COLL VENOUS BLD VENIPUNCTURE: CPT

## 2022-02-28 PROCEDURE — 80069 RENAL FUNCTION PANEL: CPT

## 2022-02-28 PROCEDURE — 84466 ASSAY OF TRANSFERRIN: CPT

## 2022-03-04 RX ORDER — LINAGLIPTIN 5 MG/1
5 TABLET, FILM COATED ORAL DAILY
Qty: 90 TABLET | Refills: 1 | Status: SHIPPED | OUTPATIENT
Start: 2022-03-04 | End: 2022-09-06

## 2022-03-16 ENCOUNTER — NURSE ONLY (OUTPATIENT)
Dept: HEMATOLOGY/ONCOLOGY | Facility: HOSPITAL | Age: 77
End: 2022-03-16
Attending: INTERNAL MEDICINE
Payer: MEDICARE

## 2022-03-16 VITALS
TEMPERATURE: 98 F | SYSTOLIC BLOOD PRESSURE: 155 MMHG | DIASTOLIC BLOOD PRESSURE: 61 MMHG | HEART RATE: 65 BPM | RESPIRATION RATE: 16 BRPM

## 2022-03-16 DIAGNOSIS — D63.1 ANEMIA OF CHRONIC RENAL FAILURE, STAGE 4 (SEVERE) (HCC): ICD-10-CM

## 2022-03-16 DIAGNOSIS — N18.4 ANEMIA OF CHRONIC RENAL FAILURE, STAGE 4 (SEVERE) (HCC): ICD-10-CM

## 2022-03-16 DIAGNOSIS — N18.4 CHRONIC KIDNEY DISEASE, STAGE IV (SEVERE) (HCC): Primary | ICD-10-CM

## 2022-03-16 LAB
HCT VFR BLD AUTO: 27.9 %
HGB BLD-MCNC: 8.7 G/DL

## 2022-03-16 PROCEDURE — 36415 COLL VENOUS BLD VENIPUNCTURE: CPT

## 2022-03-16 PROCEDURE — 85014 HEMATOCRIT: CPT

## 2022-03-16 PROCEDURE — 85018 HEMOGLOBIN: CPT

## 2022-03-16 PROCEDURE — 96372 THER/PROPH/DIAG INJ SC/IM: CPT

## 2022-03-16 NOTE — PROGRESS NOTES
Patient to infusion for Aranesp injection. Lab Results   Component Value Date    HGB 8.7 (L) 03/16/2022    HCT 27.9 (L) 03/16/2022        Reports he is well. aranesp given in right upper arm, patient tolerated well. Discharged ambulating independently with future appointments scheduled.

## 2022-03-24 ENCOUNTER — TELEPHONE (OUTPATIENT)
Dept: ENDOCRINOLOGY CLINIC | Facility: CLINIC | Age: 77
End: 2022-03-24

## 2022-03-24 NOTE — TELEPHONE ENCOUNTER
Received a fax from [address] [place] [address] out form and was placed on providers desk for review and signature

## 2022-04-13 ENCOUNTER — NURSE ONLY (OUTPATIENT)
Dept: HEMATOLOGY/ONCOLOGY | Facility: HOSPITAL | Age: 77
End: 2022-04-13
Attending: INTERNAL MEDICINE
Payer: MEDICARE

## 2022-04-13 VITALS
RESPIRATION RATE: 16 BRPM | DIASTOLIC BLOOD PRESSURE: 55 MMHG | TEMPERATURE: 98 F | HEART RATE: 66 BPM | SYSTOLIC BLOOD PRESSURE: 144 MMHG

## 2022-04-13 DIAGNOSIS — N18.4 ANEMIA OF CHRONIC RENAL FAILURE, STAGE 4 (SEVERE) (HCC): ICD-10-CM

## 2022-04-13 DIAGNOSIS — N18.4 CHRONIC KIDNEY DISEASE, STAGE IV (SEVERE) (HCC): Primary | ICD-10-CM

## 2022-04-13 DIAGNOSIS — D63.1 ANEMIA OF CHRONIC RENAL FAILURE, STAGE 4 (SEVERE) (HCC): ICD-10-CM

## 2022-04-13 LAB
HCT VFR BLD AUTO: 25.1 %
HGB BLD-MCNC: 7.6 G/DL

## 2022-04-13 PROCEDURE — 36415 COLL VENOUS BLD VENIPUNCTURE: CPT

## 2022-04-13 PROCEDURE — 85018 HEMOGLOBIN: CPT

## 2022-04-13 PROCEDURE — 85014 HEMATOCRIT: CPT

## 2022-04-13 PROCEDURE — 96372 THER/PROPH/DIAG INJ SC/IM: CPT

## 2022-04-13 NOTE — PROGRESS NOTES
Patient to infusion for Aranesp injection. Lab Results   Component Value Date    HGB 7.6 (L) 04/13/2022    HCT 25.1 (L) 04/13/2022        Patient reports he feels tired and run down. Informed patient to call Dr. Kacie Demarco and let them know about his results. Patient states he will and will let them know about his symptoms. Aranesp given in left upper arm, tolerated well. Care Plan  Problem:  Heme Imbalance: monitoring    Related to:  Disease process    Interventions:   monitoring as ordered  Aranesp injections as ordered    Evaluation:  Will continue to monitor for S/S of anemia with each visit. Will continue therapy per parameters.

## 2022-04-25 ENCOUNTER — APPOINTMENT (OUTPATIENT)
Dept: GENERAL RADIOLOGY | Facility: HOSPITAL | Age: 77
End: 2022-04-25
Attending: EMERGENCY MEDICINE
Payer: MEDICARE

## 2022-04-25 ENCOUNTER — HOSPITAL ENCOUNTER (EMERGENCY)
Facility: HOSPITAL | Age: 77
Discharge: HOME | End: 2022-04-25
Attending: EMERGENCY MEDICINE
Payer: MEDICARE

## 2022-04-25 VITALS
RESPIRATION RATE: 16 BRPM | DIASTOLIC BLOOD PRESSURE: 56 MMHG | WEIGHT: 180 LBS | OXYGEN SATURATION: 98 % | BODY MASS INDEX: 29.99 KG/M2 | SYSTOLIC BLOOD PRESSURE: 143 MMHG | HEIGHT: 65 IN | HEART RATE: 63 BPM

## 2022-04-25 DIAGNOSIS — I50.9 ACUTE ON CHRONIC CONGESTIVE HEART FAILURE, UNSPECIFIED HEART FAILURE TYPE (HCC): Primary | ICD-10-CM

## 2022-04-25 DIAGNOSIS — D64.9 ANEMIA, UNSPECIFIED TYPE: ICD-10-CM

## 2022-04-25 LAB
ANION GAP SERPL CALC-SCNC: 7 MMOL/L (ref 0–18)
BASOPHILS # BLD AUTO: 0.04 X10(3) UL (ref 0–0.2)
BASOPHILS NFR BLD AUTO: 0.4 %
BUN BLD-MCNC: 65 MG/DL (ref 7–18)
BUN/CREAT SERPL: 20.6 (ref 10–20)
CALCIUM BLD-MCNC: 8.7 MG/DL (ref 8.5–10.1)
CHLORIDE SERPL-SCNC: 112 MMOL/L (ref 98–112)
CO2 SERPL-SCNC: 24 MMOL/L (ref 21–32)
CREAT BLD-MCNC: 3.16 MG/DL
DEPRECATED RDW RBC AUTO: 48.5 FL (ref 35.1–46.3)
EOSINOPHIL # BLD AUTO: 0.02 X10(3) UL (ref 0–0.7)
EOSINOPHIL NFR BLD AUTO: 0.2 %
ERYTHROCYTE [DISTWIDTH] IN BLOOD BY AUTOMATED COUNT: 15.2 % (ref 11–15)
GLUCOSE BLD-MCNC: 181 MG/DL (ref 70–99)
HCT VFR BLD AUTO: 27.6 %
HGB BLD-MCNC: 8.4 G/DL
IMM GRANULOCYTES # BLD AUTO: 0.05 X10(3) UL (ref 0–1)
IMM GRANULOCYTES NFR BLD: 0.5 %
LYMPHOCYTES # BLD AUTO: 1.06 X10(3) UL (ref 1–4)
LYMPHOCYTES NFR BLD AUTO: 10.7 %
MCH RBC QN AUTO: 26.8 PG (ref 26–34)
MCHC RBC AUTO-ENTMCNC: 30.4 G/DL (ref 31–37)
MCV RBC AUTO: 88.2 FL
MONOCYTES # BLD AUTO: 0.65 X10(3) UL (ref 0.1–1)
MONOCYTES NFR BLD AUTO: 6.6 %
NEUTROPHILS # BLD AUTO: 8.1 X10 (3) UL (ref 1.5–7.7)
NEUTROPHILS # BLD AUTO: 8.1 X10(3) UL (ref 1.5–7.7)
NEUTROPHILS NFR BLD AUTO: 81.6 %
NT-PROBNP SERPL-MCNC: 2200 PG/ML (ref ?–125)
OSMOLALITY SERPL CALC.SUM OF ELEC: 319 MOSM/KG (ref 275–295)
PLATELET # BLD AUTO: 196 10(3)UL (ref 150–450)
POTASSIUM SERPL-SCNC: 3.3 MMOL/L (ref 3.5–5.1)
RBC # BLD AUTO: 3.13 X10(6)UL
SODIUM SERPL-SCNC: 143 MMOL/L (ref 136–145)
TROPONIN I HIGH SENSITIVITY: 25 NG/L
WBC # BLD AUTO: 9.9 X10(3) UL (ref 4–11)

## 2022-04-25 PROCEDURE — 96375 TX/PRO/DX INJ NEW DRUG ADDON: CPT

## 2022-04-25 PROCEDURE — 85025 COMPLETE CBC W/AUTO DIFF WBC: CPT | Performed by: EMERGENCY MEDICINE

## 2022-04-25 PROCEDURE — 83880 ASSAY OF NATRIURETIC PEPTIDE: CPT | Performed by: EMERGENCY MEDICINE

## 2022-04-25 PROCEDURE — 93010 ELECTROCARDIOGRAM REPORT: CPT | Performed by: EMERGENCY MEDICINE

## 2022-04-25 PROCEDURE — 80048 BASIC METABOLIC PNL TOTAL CA: CPT | Performed by: EMERGENCY MEDICINE

## 2022-04-25 PROCEDURE — 93005 ELECTROCARDIOGRAM TRACING: CPT

## 2022-04-25 PROCEDURE — 99285 EMERGENCY DEPT VISIT HI MDM: CPT

## 2022-04-25 PROCEDURE — 84484 ASSAY OF TROPONIN QUANT: CPT | Performed by: EMERGENCY MEDICINE

## 2022-04-25 PROCEDURE — 71045 X-RAY EXAM CHEST 1 VIEW: CPT | Performed by: EMERGENCY MEDICINE

## 2022-04-25 PROCEDURE — 96374 THER/PROPH/DIAG INJ IV PUSH: CPT

## 2022-04-25 RX ORDER — FUROSEMIDE 40 MG/1
40 TABLET ORAL DAILY
Qty: 7 TABLET | Refills: 0 | Status: SHIPPED | OUTPATIENT
Start: 2022-04-25 | End: 2022-05-02

## 2022-04-25 RX ORDER — FUROSEMIDE 10 MG/ML
40 INJECTION INTRAMUSCULAR; INTRAVENOUS ONCE
Status: COMPLETED | OUTPATIENT
Start: 2022-04-25 | End: 2022-04-25

## 2022-04-25 NOTE — ED INITIAL ASSESSMENT (HPI)
Patient reports generalized swelling x 3 weeks and shortness of breath. Patient reports a history of anemia and kidney disease. Patient denies being on dieretics.

## 2022-04-27 ENCOUNTER — OFFICE VISIT (OUTPATIENT)
Dept: HEMATOLOGY/ONCOLOGY | Facility: HOSPITAL | Age: 77
End: 2022-04-27
Attending: INTERNAL MEDICINE
Payer: MEDICARE

## 2022-04-27 VITALS
DIASTOLIC BLOOD PRESSURE: 56 MMHG | HEART RATE: 66 BPM | OXYGEN SATURATION: 100 % | TEMPERATURE: 98 F | SYSTOLIC BLOOD PRESSURE: 166 MMHG | RESPIRATION RATE: 18 BRPM

## 2022-04-27 DIAGNOSIS — N18.4 ANEMIA OF CHRONIC RENAL FAILURE, STAGE 4 (SEVERE) (HCC): ICD-10-CM

## 2022-04-27 DIAGNOSIS — D63.1 ANEMIA OF CHRONIC RENAL FAILURE, STAGE 4 (SEVERE) (HCC): ICD-10-CM

## 2022-04-27 DIAGNOSIS — N18.4 CHRONIC KIDNEY DISEASE, STAGE IV (SEVERE) (HCC): Primary | ICD-10-CM

## 2022-04-27 PROCEDURE — 96374 THER/PROPH/DIAG INJ IV PUSH: CPT

## 2022-04-27 NOTE — PROGRESS NOTES
David Armijo arrives for [employer] 2nd of 5. David Armijo  reports feeling very tired. First dose has been given in the ER. PIV started in left ac, positive blood return noted. Venofer given slowly over 5 minutes, positive blood return noted throughout. David Armijo tolerated well with no s/s of adverse reaction noted . Observed for 30 minutes, post vs wnl. PIV removed, site covered with 2x2 and coban.  David Armijo  discharged stable with avs.

## 2022-05-03 ENCOUNTER — OFFICE VISIT (OUTPATIENT)
Dept: HEMATOLOGY/ONCOLOGY | Facility: HOSPITAL | Age: 77
End: 2022-05-03
Attending: INTERNAL MEDICINE
Payer: MEDICARE

## 2022-05-03 VITALS
DIASTOLIC BLOOD PRESSURE: 46 MMHG | RESPIRATION RATE: 16 BRPM | OXYGEN SATURATION: 99 % | TEMPERATURE: 98 F | SYSTOLIC BLOOD PRESSURE: 150 MMHG | HEART RATE: 66 BPM

## 2022-05-03 DIAGNOSIS — N18.4 ANEMIA OF CHRONIC RENAL FAILURE, STAGE 4 (SEVERE) (HCC): ICD-10-CM

## 2022-05-03 DIAGNOSIS — N18.4 CHRONIC KIDNEY DISEASE, STAGE IV (SEVERE) (HCC): Primary | ICD-10-CM

## 2022-05-03 DIAGNOSIS — D63.1 ANEMIA OF CHRONIC RENAL FAILURE, STAGE 4 (SEVERE) (HCC): ICD-10-CM

## 2022-05-03 PROCEDURE — 96374 THER/PROPH/DIAG INJ IV PUSH: CPT

## 2022-05-03 NOTE — PROGRESS NOTES
Barbrard Pickett arrives for venofer 200mg - 3 of 5. Mandidomenic Pickett  reports feeling very tired. (First dose has been given in the ER). PIV started in right ac - 2nd attempt, positive blood return noted. Venofer given slowly over 5 minutes, positive blood return noted throughout. Mandidomenic Nieves tolerated well with no s/s of adverse reaction noted . Observed for 30 minutes, post vs wnl. PIV removed, site covered with 2x2 and coban.  Elva Pickett  discharged stable with avs.

## 2022-05-05 ENCOUNTER — OFFICE VISIT (OUTPATIENT)
Dept: HEMATOLOGY/ONCOLOGY | Facility: HOSPITAL | Age: 77
End: 2022-05-05
Attending: INTERNAL MEDICINE
Payer: MEDICARE

## 2022-05-05 VITALS
OXYGEN SATURATION: 100 % | TEMPERATURE: 99 F | HEART RATE: 70 BPM | RESPIRATION RATE: 16 BRPM | SYSTOLIC BLOOD PRESSURE: 153 MMHG | DIASTOLIC BLOOD PRESSURE: 62 MMHG

## 2022-05-05 DIAGNOSIS — D63.1 ANEMIA OF CHRONIC RENAL FAILURE, STAGE 4 (SEVERE) (HCC): ICD-10-CM

## 2022-05-05 DIAGNOSIS — N18.4 CHRONIC KIDNEY DISEASE, STAGE IV (SEVERE) (HCC): Primary | ICD-10-CM

## 2022-05-05 DIAGNOSIS — N18.4 ANEMIA OF CHRONIC RENAL FAILURE, STAGE 4 (SEVERE) (HCC): ICD-10-CM

## 2022-05-05 PROCEDURE — 96374 THER/PROPH/DIAG INJ IV PUSH: CPT

## 2022-05-05 NOTE — PROGRESS NOTES
Pt tolerated venofer 200 mg infusion well without issue or complaint and completed 30 minute post observation period.      Education Record    Learner:  Patient    Disease / Diagnosis: CHELSEY    Barriers / Limitations:  None   Comments:    Method:  Discussion   Comments:    General Topics:  Plan of care reviewed   Comments:    Outcome:  Shows understanding   Comments:

## 2022-05-09 ENCOUNTER — OFFICE VISIT (OUTPATIENT)
Dept: HEMATOLOGY/ONCOLOGY | Facility: HOSPITAL | Age: 77
End: 2022-05-09
Attending: INTERNAL MEDICINE
Payer: MEDICARE

## 2022-05-09 VITALS
RESPIRATION RATE: 16 BRPM | HEART RATE: 62 BPM | TEMPERATURE: 99 F | OXYGEN SATURATION: 100 % | SYSTOLIC BLOOD PRESSURE: 168 MMHG | DIASTOLIC BLOOD PRESSURE: 58 MMHG

## 2022-05-09 DIAGNOSIS — N18.4 CHRONIC KIDNEY DISEASE, STAGE IV (SEVERE) (HCC): Primary | ICD-10-CM

## 2022-05-09 DIAGNOSIS — N18.4 ANEMIA OF CHRONIC RENAL FAILURE, STAGE 4 (SEVERE) (HCC): ICD-10-CM

## 2022-05-09 DIAGNOSIS — N18.4 CKD (CHRONIC KIDNEY DISEASE) STAGE 4, GFR 15-29 ML/MIN (HCC): ICD-10-CM

## 2022-05-09 DIAGNOSIS — D63.1 ANEMIA OF CHRONIC RENAL FAILURE, STAGE 4 (SEVERE) (HCC): ICD-10-CM

## 2022-05-09 LAB
ALBUMIN SERPL-MCNC: 2.8 G/DL (ref 3.4–5)
ANION GAP SERPL CALC-SCNC: 5 MMOL/L (ref 0–18)
BUN BLD-MCNC: 69 MG/DL (ref 7–18)
BUN/CREAT SERPL: 21.5 (ref 10–20)
CALCIUM BLD-MCNC: 8.6 MG/DL (ref 8.5–10.1)
CHLORIDE SERPL-SCNC: 110 MMOL/L (ref 98–112)
CO2 SERPL-SCNC: 28 MMOL/L (ref 21–32)
CREAT BLD-MCNC: 3.21 MG/DL
GLUCOSE BLD-MCNC: 182 MG/DL (ref 70–99)
HGB BLD-MCNC: 8.9 G/DL
OSMOLALITY SERPL CALC.SUM OF ELEC: 321 MOSM/KG (ref 275–295)
PHOSPHATE SERPL-MCNC: 2.5 MG/DL (ref 2.5–4.9)
POTASSIUM SERPL-SCNC: 3.2 MMOL/L (ref 3.5–5.1)
SODIUM SERPL-SCNC: 143 MMOL/L (ref 136–145)

## 2022-05-09 PROCEDURE — 96374 THER/PROPH/DIAG INJ IV PUSH: CPT

## 2022-05-09 PROCEDURE — 85018 HEMOGLOBIN: CPT

## 2022-05-09 PROCEDURE — 80069 RENAL FUNCTION PANEL: CPT

## 2022-05-09 NOTE — PROGRESS NOTES
Pt to infusion for venofer 200mg - 5 of 5. Arrives ambulating independently. PIV established to left [facility] - present blood return noted. Venofer given over slow IVP. Pt appeared to tolerate well. 30 min obs completed. VSS. PIV removed, site covered with gauze and coban. Discharged stable and ambulating independently.

## 2022-05-11 ENCOUNTER — NURSE ONLY (OUTPATIENT)
Dept: HEMATOLOGY/ONCOLOGY | Facility: HOSPITAL | Age: 77
End: 2022-05-11
Attending: INTERNAL MEDICINE
Payer: MEDICARE

## 2022-05-11 VITALS
TEMPERATURE: 98 F | RESPIRATION RATE: 16 BRPM | HEART RATE: 64 BPM | SYSTOLIC BLOOD PRESSURE: 170 MMHG | DIASTOLIC BLOOD PRESSURE: 62 MMHG

## 2022-05-11 DIAGNOSIS — D63.1 ANEMIA OF CHRONIC RENAL FAILURE, STAGE 4 (SEVERE) (HCC): ICD-10-CM

## 2022-05-11 DIAGNOSIS — N18.4 CHRONIC KIDNEY DISEASE, STAGE IV (SEVERE) (HCC): Primary | ICD-10-CM

## 2022-05-11 DIAGNOSIS — N18.4 ANEMIA OF CHRONIC RENAL FAILURE, STAGE 4 (SEVERE) (HCC): ICD-10-CM

## 2022-05-11 LAB
HCT VFR BLD AUTO: 28.7 %
HGB BLD-MCNC: 8.9 G/DL

## 2022-05-11 PROCEDURE — 85018 HEMOGLOBIN: CPT

## 2022-05-11 PROCEDURE — 85014 HEMATOCRIT: CPT

## 2022-05-11 PROCEDURE — 96372 THER/PROPH/DIAG INJ SC/IM: CPT

## 2022-05-11 PROCEDURE — 36415 COLL VENOUS BLD VENIPUNCTURE: CPT

## 2022-05-11 NOTE — PROGRESS NOTES
Patient to infusion for Aranesp injection. Lab Results   Component Value Date    HGB 8.9 (L) 05/11/2022    HCT 28.7 (L) 05/11/2022      Reports he is feeling slightly better. Still has some fatigue. Labs collected left [facility], site covered with 2x2 and coban. Per order Aranesp given left upper arm, patient tolerated well. Discharged with future appointments scheduled ambulating with cane. Care Plan  Problem:  Heme Imbalance: monitoring    Related to:  Disease process    Interventions:   monitoring as ordered  Aranesp injections as ordered    Evaluation:  Will continue to monitor for S/S of anemia with each visit. Will continue therapy per parameters.

## 2022-05-18 ENCOUNTER — TELEPHONE (OUTPATIENT)
Dept: ENDOCRINOLOGY CLINIC | Facility: CLINIC | Age: 77
End: 2022-05-18

## 2022-05-24 ENCOUNTER — TELEPHONE (OUTPATIENT)
Dept: ENDOCRINOLOGY CLINIC | Facility: CLINIC | Age: 77
End: 2022-05-24

## 2022-05-24 NOTE — TELEPHONE ENCOUNTER
Received fax from [facility] foot clinic. Completed the attached form and placed on MD desk for signature.

## 2022-06-06 ENCOUNTER — TELEPHONE (OUTPATIENT)
Dept: ENDOCRINOLOGY CLINIC | Facility: CLINIC | Age: 77
End: 2022-06-06

## 2022-06-06 NOTE — TELEPHONE ENCOUNTER
Kwesi Holden from [facility] foot clinic states they have not received anything back for the diabetic shoes.  Refer to TE from 5-24 and please re fax to #[phone]

## 2022-06-08 ENCOUNTER — APPOINTMENT (OUTPATIENT)
Dept: HEMATOLOGY/ONCOLOGY | Facility: HOSPITAL | Age: 77
End: 2022-06-08
Attending: INTERNAL MEDICINE
Payer: MEDICARE

## 2022-06-08 VITALS
DIASTOLIC BLOOD PRESSURE: 55 MMHG | RESPIRATION RATE: 16 BRPM | HEART RATE: 68 BPM | TEMPERATURE: 98 F | OXYGEN SATURATION: 100 % | SYSTOLIC BLOOD PRESSURE: 138 MMHG

## 2022-06-08 DIAGNOSIS — D63.1 ANEMIA OF CHRONIC RENAL FAILURE, STAGE 4 (SEVERE) (HCC): ICD-10-CM

## 2022-06-08 DIAGNOSIS — N18.4 CKD (CHRONIC KIDNEY DISEASE) STAGE 4, GFR 15-29 ML/MIN (HCC): ICD-10-CM

## 2022-06-08 DIAGNOSIS — N18.4 CHRONIC KIDNEY DISEASE, STAGE IV (SEVERE) (HCC): Primary | ICD-10-CM

## 2022-06-08 DIAGNOSIS — N18.4 ANEMIA OF CHRONIC RENAL FAILURE, STAGE 4 (SEVERE) (HCC): ICD-10-CM

## 2022-06-08 LAB
ALBUMIN SERPL-MCNC: 2.7 G/DL (ref 3.4–5)
ANION GAP SERPL CALC-SCNC: 5 MMOL/L (ref 0–18)
BUN BLD-MCNC: 60 MG/DL (ref 7–18)
BUN/CREAT SERPL: 18.4 (ref 10–20)
CALCIUM BLD-MCNC: 8.6 MG/DL (ref 8.5–10.1)
CHLORIDE SERPL-SCNC: 108 MMOL/L (ref 98–112)
CO2 SERPL-SCNC: 28 MMOL/L (ref 21–32)
CREAT BLD-MCNC: 3.26 MG/DL
GLUCOSE BLD-MCNC: 195 MG/DL (ref 70–99)
HCT VFR BLD AUTO: 28.5 %
HGB BLD-MCNC: 9 G/DL
IRON SATN MFR SERPL: 26 %
IRON SERPL-MCNC: 59 UG/DL
OSMOLALITY SERPL CALC.SUM OF ELEC: 314 MOSM/KG (ref 275–295)
PHOSPHATE SERPL-MCNC: 2.6 MG/DL (ref 2.5–4.9)
POTASSIUM SERPL-SCNC: 3 MMOL/L (ref 3.5–5.1)
SODIUM SERPL-SCNC: 141 MMOL/L (ref 136–145)
TIBC SERPL-MCNC: 225 UG/DL (ref 240–450)
TRANSFERRIN SERPL-MCNC: 151 MG/DL (ref 200–360)

## 2022-06-08 PROCEDURE — 85018 HEMOGLOBIN: CPT

## 2022-06-08 PROCEDURE — 83540 ASSAY OF IRON: CPT

## 2022-06-08 PROCEDURE — 96372 THER/PROPH/DIAG INJ SC/IM: CPT

## 2022-06-08 PROCEDURE — 85014 HEMATOCRIT: CPT

## 2022-06-08 PROCEDURE — 84466 ASSAY OF TRANSFERRIN: CPT

## 2022-06-08 PROCEDURE — 36415 COLL VENOUS BLD VENIPUNCTURE: CPT

## 2022-06-08 PROCEDURE — 80069 RENAL FUNCTION PANEL: CPT

## 2022-06-08 NOTE — PROGRESS NOTES
Patient to lab for Q4 week lab h/h  and possible Aranesp injection. Also q16 week iron levels, renal fxn panel per Dr Tika Rolon  Pt is not on dialysis, he has kidney disease, anemia. Sees Dr Tika Rolon. Periph lab drawn, tolerated well    HGB = 9.0  Patient has complaint mild fatigue, mild shortness of breath with exertion at times. Chronic pains; has some generalized pain though d/t stressors in life, chronic body aches x 25 yrs  Emotional support offered. Rates to lower legs, back =4- 5/10 now. Aranesp inj given today right upper arm SQ - to given if Hgl is less than 11. Inj tolerated well. Will return in 4 weeks. Patient verbalizes understanding of Aranesp injection and HH monitoring prior to each dose.      Discharged stable & ambulatory with cane, with next appointments, provided updated AVS.

## 2022-06-09 NOTE — TELEPHONE ENCOUNTER
LM stating fax was sent on 5/24/22 - per answering machine they are out of office until 6/14/22 - will check to see if fax is scanned into chart by then and re-fax

## 2022-06-14 ENCOUNTER — HOSPITAL ENCOUNTER (EMERGENCY)
Facility: HOSPITAL | Age: 77
Discharge: HOME | End: 2022-06-14
Payer: MEDICARE

## 2022-06-14 ENCOUNTER — APPOINTMENT (OUTPATIENT)
Dept: GENERAL RADIOLOGY | Facility: HOSPITAL | Age: 77
End: 2022-06-14
Payer: MEDICARE

## 2022-06-14 ENCOUNTER — APPOINTMENT (OUTPATIENT)
Dept: GENERAL RADIOLOGY | Facility: HOSPITAL | Age: 77
End: 2022-06-14
Attending: EMERGENCY MEDICINE
Payer: MEDICARE

## 2022-06-14 VITALS
OXYGEN SATURATION: 99 % | SYSTOLIC BLOOD PRESSURE: 126 MMHG | TEMPERATURE: 98 F | WEIGHT: 179 LBS | HEIGHT: 65 IN | RESPIRATION RATE: 18 BRPM | BODY MASS INDEX: 29.82 KG/M2 | HEART RATE: 73 BPM | DIASTOLIC BLOOD PRESSURE: 62 MMHG

## 2022-06-14 DIAGNOSIS — I50.9 CHRONIC CONGESTIVE HEART FAILURE, UNSPECIFIED HEART FAILURE TYPE (HCC): ICD-10-CM

## 2022-06-14 DIAGNOSIS — M10.00 ACUTE IDIOPATHIC GOUT, UNSPECIFIED SITE: Primary | ICD-10-CM

## 2022-06-14 DIAGNOSIS — E87.6 HYPOKALEMIA: ICD-10-CM

## 2022-06-14 LAB
ANION GAP SERPL CALC-SCNC: 9 MMOL/L (ref 0–18)
BASOPHILS # BLD AUTO: 0.04 X10(3) UL (ref 0–0.2)
BASOPHILS NFR BLD AUTO: 0.4 %
BUN BLD-MCNC: 55 MG/DL (ref 7–18)
BUN/CREAT SERPL: 15.6 (ref 10–20)
CALCIUM BLD-MCNC: 9 MG/DL (ref 8.5–10.1)
CHLORIDE SERPL-SCNC: 105 MMOL/L (ref 98–112)
CO2 SERPL-SCNC: 28 MMOL/L (ref 21–32)
CREAT BLD-MCNC: 3.52 MG/DL
DEPRECATED RDW RBC AUTO: 49.8 FL (ref 35.1–46.3)
EOSINOPHIL # BLD AUTO: 0.19 X10(3) UL (ref 0–0.7)
EOSINOPHIL NFR BLD AUTO: 2.1 %
ERYTHROCYTE [DISTWIDTH] IN BLOOD BY AUTOMATED COUNT: 15.7 % (ref 11–15)
EST. AVERAGE GLUCOSE BLD GHB EST-MCNC: 169 MG/DL (ref 68–126)
GLUCOSE BLD-MCNC: 211 MG/DL (ref 70–99)
HBA1C MFR BLD: 7.5 % (ref ?–5.7)
HCT VFR BLD AUTO: 30.6 %
HGB BLD-MCNC: 9.5 G/DL
IMM GRANULOCYTES # BLD AUTO: 0.03 X10(3) UL (ref 0–1)
IMM GRANULOCYTES NFR BLD: 0.3 %
LYMPHOCYTES # BLD AUTO: 1.49 X10(3) UL (ref 1–4)
LYMPHOCYTES NFR BLD AUTO: 16.5 %
MCH RBC QN AUTO: 27.1 PG (ref 26–34)
MCHC RBC AUTO-ENTMCNC: 31 G/DL (ref 31–37)
MCV RBC AUTO: 87.4 FL
MONOCYTES # BLD AUTO: 0.78 X10(3) UL (ref 0.1–1)
MONOCYTES NFR BLD AUTO: 8.6 %
NEUTROPHILS # BLD AUTO: 6.52 X10 (3) UL (ref 1.5–7.7)
NEUTROPHILS # BLD AUTO: 6.52 X10(3) UL (ref 1.5–7.7)
NEUTROPHILS NFR BLD AUTO: 72.1 %
NT-PROBNP SERPL-MCNC: 2740 PG/ML (ref ?–450)
OSMOLALITY SERPL CALC.SUM OF ELEC: 315 MOSM/KG (ref 275–295)
PLATELET # BLD AUTO: 196 10(3)UL (ref 150–450)
POTASSIUM SERPL-SCNC: 3.2 MMOL/L (ref 3.5–5.1)
RBC # BLD AUTO: 3.5 X10(6)UL
SODIUM SERPL-SCNC: 142 MMOL/L (ref 136–145)
URATE SERPL-MCNC: 8.4 MG/DL
WBC # BLD AUTO: 9.1 X10(3) UL (ref 4–11)

## 2022-06-14 PROCEDURE — 83036 HEMOGLOBIN GLYCOSYLATED A1C: CPT | Performed by: NURSE PRACTITIONER

## 2022-06-14 PROCEDURE — 3051F HG A1C>EQUAL 7.0%<8.0%: CPT | Performed by: INTERNAL MEDICINE

## 2022-06-14 PROCEDURE — 83880 ASSAY OF NATRIURETIC PEPTIDE: CPT | Performed by: EMERGENCY MEDICINE

## 2022-06-14 PROCEDURE — 99284 EMERGENCY DEPT VISIT MOD MDM: CPT

## 2022-06-14 PROCEDURE — 73610 X-RAY EXAM OF ANKLE: CPT

## 2022-06-14 PROCEDURE — 71045 X-RAY EXAM CHEST 1 VIEW: CPT | Performed by: EMERGENCY MEDICINE

## 2022-06-14 PROCEDURE — 96374 THER/PROPH/DIAG INJ IV PUSH: CPT

## 2022-06-14 PROCEDURE — 85025 COMPLETE CBC W/AUTO DIFF WBC: CPT | Performed by: EMERGENCY MEDICINE

## 2022-06-14 PROCEDURE — 80048 BASIC METABOLIC PNL TOTAL CA: CPT | Performed by: EMERGENCY MEDICINE

## 2022-06-14 PROCEDURE — 84550 ASSAY OF BLOOD/URIC ACID: CPT | Performed by: EMERGENCY MEDICINE

## 2022-06-14 RX ORDER — PREDNISONE 20 MG/1
40 TABLET ORAL DAILY
Qty: 6 TABLET | Refills: 0 | Status: SHIPPED | OUTPATIENT
Start: 2022-06-14 | End: 2022-06-17

## 2022-06-14 RX ORDER — POTASSIUM CHLORIDE 20 MEQ/1
40 TABLET, EXTENDED RELEASE ORAL ONCE
Status: COMPLETED | OUTPATIENT
Start: 2022-06-14 | End: 2022-06-14

## 2022-06-14 RX ORDER — FUROSEMIDE 10 MG/ML
40 INJECTION INTRAMUSCULAR; INTRAVENOUS ONCE
Status: COMPLETED | OUTPATIENT
Start: 2022-06-14 | End: 2022-06-14

## 2022-06-14 RX ORDER — TRAMADOL HYDROCHLORIDE 50 MG/1
50 TABLET ORAL EVERY 12 HOURS PRN
Qty: 10 TABLET | Refills: 0 | Status: SHIPPED | OUTPATIENT
Start: 2022-06-14

## 2022-06-14 NOTE — TELEPHONE ENCOUNTER
Levi Mata is calling again states did not receive fax was calling to request it be sent via mail to office. [facility]. [address] [facility], [address] [place] [address]

## 2022-06-14 NOTE — ED INITIAL ASSESSMENT (HPI)
Pt presents to the ER with right ankle pain since Saturday. Deniers trauma. Does report going on road trip this weekend.

## 2022-07-06 ENCOUNTER — NURSE ONLY (OUTPATIENT)
Dept: HEMATOLOGY/ONCOLOGY | Facility: HOSPITAL | Age: 77
End: 2022-07-06
Attending: INTERNAL MEDICINE
Payer: MEDICARE

## 2022-07-06 VITALS
DIASTOLIC BLOOD PRESSURE: 49 MMHG | SYSTOLIC BLOOD PRESSURE: 132 MMHG | HEART RATE: 69 BPM | RESPIRATION RATE: 16 BRPM | OXYGEN SATURATION: 100 % | TEMPERATURE: 98 F

## 2022-07-06 DIAGNOSIS — N18.4 CHRONIC KIDNEY DISEASE, STAGE IV (SEVERE) (HCC): Primary | ICD-10-CM

## 2022-07-06 DIAGNOSIS — D63.1 ANEMIA OF CHRONIC RENAL FAILURE, UNSPECIFIED CKD STAGE: ICD-10-CM

## 2022-07-06 DIAGNOSIS — N18.4 ANEMIA OF CHRONIC RENAL FAILURE, STAGE 4 (SEVERE) (HCC): ICD-10-CM

## 2022-07-06 DIAGNOSIS — N18.9 ANEMIA OF CHRONIC RENAL FAILURE, UNSPECIFIED CKD STAGE: ICD-10-CM

## 2022-07-06 DIAGNOSIS — D63.1 ANEMIA OF CHRONIC RENAL FAILURE, STAGE 4 (SEVERE) (HCC): ICD-10-CM

## 2022-07-06 LAB
ALBUMIN SERPL-MCNC: 2.8 G/DL (ref 3.4–5)
ANION GAP SERPL CALC-SCNC: 7 MMOL/L (ref 0–18)
BUN BLD-MCNC: 57 MG/DL (ref 7–18)
BUN/CREAT SERPL: 17 (ref 10–20)
CALCIUM BLD-MCNC: 8.6 MG/DL (ref 8.5–10.1)
CHLORIDE SERPL-SCNC: 107 MMOL/L (ref 98–112)
CO2 SERPL-SCNC: 27 MMOL/L (ref 21–32)
CREAT BLD-MCNC: 3.36 MG/DL
GLUCOSE BLD-MCNC: 157 MG/DL (ref 70–99)
HCT VFR BLD AUTO: 31.7 %
HGB BLD-MCNC: 10 G/DL
OSMOLALITY SERPL CALC.SUM OF ELEC: 311 MOSM/KG (ref 275–295)
PHOSPHATE SERPL-MCNC: 3.1 MG/DL (ref 2.5–4.9)
POTASSIUM SERPL-SCNC: 3.3 MMOL/L (ref 3.5–5.1)
SODIUM SERPL-SCNC: 141 MMOL/L (ref 136–145)

## 2022-07-06 PROCEDURE — 80069 RENAL FUNCTION PANEL: CPT

## 2022-07-06 PROCEDURE — 85014 HEMATOCRIT: CPT

## 2022-07-06 PROCEDURE — 96372 THER/PROPH/DIAG INJ SC/IM: CPT

## 2022-07-06 PROCEDURE — 85018 HEMOGLOBIN: CPT

## 2022-07-06 PROCEDURE — 36415 COLL VENOUS BLD VENIPUNCTURE: CPT

## 2022-07-08 ENCOUNTER — OFFICE VISIT (OUTPATIENT)
Dept: ENDOCRINOLOGY CLINIC | Facility: CLINIC | Age: 77
End: 2022-07-08
Payer: MEDICARE

## 2022-07-08 ENCOUNTER — TELEPHONE (OUTPATIENT)
Dept: HEMATOLOGY/ONCOLOGY | Facility: HOSPITAL | Age: 77
End: 2022-07-08

## 2022-07-08 VITALS
BODY MASS INDEX: 29.99 KG/M2 | HEART RATE: 66 BPM | DIASTOLIC BLOOD PRESSURE: 72 MMHG | SYSTOLIC BLOOD PRESSURE: 177 MMHG | HEIGHT: 65 IN | RESPIRATION RATE: 16 BRPM | WEIGHT: 180 LBS

## 2022-07-08 DIAGNOSIS — E11.42 DIABETIC POLYNEUROPATHY ASSOCIATED WITH TYPE 2 DIABETES MELLITUS (HCC): ICD-10-CM

## 2022-07-08 DIAGNOSIS — Z79.4 TYPE 2 DIABETES MELLITUS WITH OTHER SPECIFIED COMPLICATION, WITH LONG-TERM CURRENT USE OF INSULIN (HCC): ICD-10-CM

## 2022-07-08 DIAGNOSIS — E78.5 DYSLIPIDEMIA: Primary | ICD-10-CM

## 2022-07-08 DIAGNOSIS — E11.69 TYPE 2 DIABETES MELLITUS WITH OTHER SPECIFIED COMPLICATION, WITH LONG-TERM CURRENT USE OF INSULIN (HCC): ICD-10-CM

## 2022-07-08 LAB
GLUCOSE BLOOD: 242
TEST STRIP LOT #: NORMAL NUMERIC

## 2022-07-08 PROCEDURE — 3008F BODY MASS INDEX DOCD: CPT | Performed by: INTERNAL MEDICINE

## 2022-07-08 PROCEDURE — 3078F DIAST BP <80 MM HG: CPT | Performed by: INTERNAL MEDICINE

## 2022-07-08 PROCEDURE — 82947 ASSAY GLUCOSE BLOOD QUANT: CPT | Performed by: INTERNAL MEDICINE

## 2022-07-08 PROCEDURE — 99214 OFFICE O/P EST MOD 30 MIN: CPT | Performed by: INTERNAL MEDICINE

## 2022-07-08 PROCEDURE — 3077F SYST BP >= 140 MM HG: CPT | Performed by: INTERNAL MEDICINE

## 2022-07-08 NOTE — TELEPHONE ENCOUNTER
Eileen from Dr Tyree Cox office called, please disregard order faxed over on 7/7 for Aranesp, will refax new order today to replace

## 2022-07-11 ENCOUNTER — TELEPHONE (OUTPATIENT)
Dept: ENDOCRINOLOGY CLINIC | Facility: CLINIC | Age: 77
End: 2022-07-11

## 2022-07-11 NOTE — TELEPHONE ENCOUNTER
Forms retrieved and looks like staff have been faxing form to their office but keeps getting failure ([phone]). Spoke to Mary ceja and informed her of this and unfortunately they do not have an alternate fax number. Per RN Titi Hurtado patient was given a copy of this form. Form sent to scanning.
Nisa/[facility] states that some forms were sent over on 5/18 and 5/23 but were not faxed back. Please call with status.
never

## 2022-07-27 ENCOUNTER — TELEPHONE (OUTPATIENT)
Dept: ENDOCRINOLOGY CLINIC | Facility: CLINIC | Age: 77
End: 2022-07-27

## 2022-07-28 ENCOUNTER — TELEPHONE (OUTPATIENT)
Dept: GASTROENTEROLOGY | Facility: CLINIC | Age: 77
End: 2022-07-28

## 2022-07-28 NOTE — TELEPHONE ENCOUNTER
Called Silke to see if she received the form and states she has not. RN asked her to call the office back if she has not received it in a couple of hours.

## 2022-07-28 NOTE — TELEPHONE ENCOUNTER
Called Nisa to inform her that fax failed 3x. She asked for the form to be mailed to their office instead. There is also another TE regarding the same issue. There is a new form on provider's desk.   Will wait provider to sign that new form and will mail the form

## 2022-07-28 NOTE — TELEPHONE ENCOUNTER
Left detailed message letting patient know there is a black reading glasses here and brand is foster demario and unsure if this is the one he was looking for.

## 2022-07-28 NOTE — TELEPHONE ENCOUNTER
Spoke to [facility] and [facility] office can just put an updated date on the 2 pages, no need to cross out the old dates. RN faxed form with written most LOV note date.

## 2022-08-01 NOTE — TELEPHONE ENCOUNTER
Tried faxing the form again today. Mailed copy of the form and gave the original to the patient as he had stopped in the clinic.

## 2022-08-03 ENCOUNTER — NURSE ONLY (OUTPATIENT)
Dept: HEMATOLOGY/ONCOLOGY | Facility: HOSPITAL | Age: 77
End: 2022-08-03
Attending: INTERNAL MEDICINE
Payer: MEDICARE

## 2022-08-03 VITALS
RESPIRATION RATE: 16 BRPM | OXYGEN SATURATION: 100 % | HEART RATE: 67 BPM | DIASTOLIC BLOOD PRESSURE: 49 MMHG | SYSTOLIC BLOOD PRESSURE: 133 MMHG

## 2022-08-03 DIAGNOSIS — N18.4 CHRONIC KIDNEY DISEASE, STAGE IV (SEVERE) (HCC): Primary | ICD-10-CM

## 2022-08-03 DIAGNOSIS — N18.4 ANEMIA OF CHRONIC RENAL FAILURE, STAGE 4 (SEVERE) (HCC): ICD-10-CM

## 2022-08-03 DIAGNOSIS — D63.1 ANEMIA OF CHRONIC RENAL FAILURE, STAGE 4 (SEVERE) (HCC): ICD-10-CM

## 2022-08-03 LAB
HCT VFR BLD AUTO: 31.1 %
HGB BLD-MCNC: 9.7 G/DL

## 2022-08-03 PROCEDURE — 36415 COLL VENOUS BLD VENIPUNCTURE: CPT

## 2022-08-03 PROCEDURE — 85018 HEMOGLOBIN: CPT

## 2022-08-03 PROCEDURE — 85014 HEMATOCRIT: CPT

## 2022-08-03 PROCEDURE — 96372 THER/PROPH/DIAG INJ SC/IM: CPT

## 2022-08-03 NOTE — PROGRESS NOTES
Patient to infusion for Aranesp injection. HGB = 9.7    Patient tolerated injection well, administered per parameters. Patient verbalizes understanding of Aranesp injection and HH monitoring prior to each dose. Discharged stable, with next appointment. Care Plan  Problem:  Heme Imbalance: identified, monitoring    Related to:  Disease process    Interventions:  HH monitoring as ordered  Aranesp injections as ordered    Evaluation:  Will continue to monitor for S/S of anemia with each visit. Will continue therapy per parameters.

## 2022-08-29 ENCOUNTER — OFFICE VISIT (OUTPATIENT)
Dept: INTERNAL MEDICINE CLINIC | Facility: CLINIC | Age: 77
End: 2022-08-29
Payer: MEDICARE

## 2022-08-29 VITALS
SYSTOLIC BLOOD PRESSURE: 138 MMHG | BODY MASS INDEX: 30.16 KG/M2 | HEART RATE: 78 BPM | WEIGHT: 181 LBS | OXYGEN SATURATION: 95 % | HEIGHT: 65 IN | DIASTOLIC BLOOD PRESSURE: 78 MMHG

## 2022-08-29 DIAGNOSIS — M10.9 GOUT, UNSPECIFIED CAUSE, UNSPECIFIED CHRONICITY, UNSPECIFIED SITE: ICD-10-CM

## 2022-08-29 DIAGNOSIS — N40.0 BENIGN PROSTATIC HYPERPLASIA, UNSPECIFIED WHETHER LOWER URINARY TRACT SYMPTOMS PRESENT: ICD-10-CM

## 2022-08-29 DIAGNOSIS — Z00.00 ANNUAL PHYSICAL EXAM: Primary | ICD-10-CM

## 2022-08-29 DIAGNOSIS — Z79.4 TYPE 2 DIABETES MELLITUS WITH BOTH EYES AFFECTED BY PROLIFERATIVE RETINOPATHY AND TRACTION RETINAL DETACHMENTS NOT INVOLVING MACULAE, WITH LONG-TERM CURRENT USE OF INSULIN (HCC): ICD-10-CM

## 2022-08-29 DIAGNOSIS — E11.21 DIABETIC NEPHROPATHY ASSOCIATED WITH TYPE 2 DIABETES MELLITUS (HCC): ICD-10-CM

## 2022-08-29 DIAGNOSIS — Z00.00 ENCOUNTER FOR ANNUAL HEALTH EXAMINATION: ICD-10-CM

## 2022-08-29 DIAGNOSIS — I50.32 CHRONIC DIASTOLIC CONGESTIVE HEART FAILURE (HCC): ICD-10-CM

## 2022-08-29 DIAGNOSIS — M54.16 LUMBAR RADICULOPATHY: ICD-10-CM

## 2022-08-29 DIAGNOSIS — I10 ESSENTIAL HYPERTENSION: ICD-10-CM

## 2022-08-29 DIAGNOSIS — G62.9 NEUROPATHY: ICD-10-CM

## 2022-08-29 DIAGNOSIS — N18.4 CHRONIC KIDNEY DISEASE, STAGE IV (SEVERE) (HCC): ICD-10-CM

## 2022-08-29 DIAGNOSIS — E78.2 MIXED HYPERLIPIDEMIA: ICD-10-CM

## 2022-08-29 DIAGNOSIS — M54.12 CERVICAL RADICULOPATHY: ICD-10-CM

## 2022-08-29 DIAGNOSIS — Z13.89 SCREENING FOR NEPHROPATHY: ICD-10-CM

## 2022-08-29 DIAGNOSIS — I27.20 PULMONARY HTN (HCC): ICD-10-CM

## 2022-08-29 DIAGNOSIS — E11.3533 TYPE 2 DIABETES MELLITUS WITH BOTH EYES AFFECTED BY PROLIFERATIVE RETINOPATHY AND TRACTION RETINAL DETACHMENTS NOT INVOLVING MACULAE, WITH LONG-TERM CURRENT USE OF INSULIN (HCC): ICD-10-CM

## 2022-08-29 DIAGNOSIS — G47.33 OSA (OBSTRUCTIVE SLEEP APNEA): ICD-10-CM

## 2022-08-29 DIAGNOSIS — D64.9 CHRONIC ANEMIA: ICD-10-CM

## 2022-08-29 DIAGNOSIS — Z13.29 SCREENING FOR THYROID DISORDER: ICD-10-CM

## 2022-08-29 DIAGNOSIS — Z12.5 SCREENING FOR PROSTATE CANCER: ICD-10-CM

## 2022-08-31 ENCOUNTER — TELEPHONE (OUTPATIENT)
Dept: INTERNAL MEDICINE CLINIC | Facility: CLINIC | Age: 77
End: 2022-08-31

## 2022-08-31 ENCOUNTER — NURSE ONLY (OUTPATIENT)
Dept: HEMATOLOGY/ONCOLOGY | Facility: HOSPITAL | Age: 77
End: 2022-08-31
Attending: INTERNAL MEDICINE
Payer: MEDICARE

## 2022-08-31 DIAGNOSIS — D63.1 ANEMIA OF CHRONIC RENAL FAILURE, STAGE 4 (SEVERE) (HCC): ICD-10-CM

## 2022-08-31 DIAGNOSIS — E78.2 MIXED HYPERLIPIDEMIA: ICD-10-CM

## 2022-08-31 DIAGNOSIS — D64.9 CHRONIC ANEMIA: ICD-10-CM

## 2022-08-31 DIAGNOSIS — Z79.4 TYPE 2 DIABETES MELLITUS WITH BOTH EYES AFFECTED BY PROLIFERATIVE RETINOPATHY AND TRACTION RETINAL DETACHMENTS NOT INVOLVING MACULAE, WITH LONG-TERM CURRENT USE OF INSULIN (HCC): ICD-10-CM

## 2022-08-31 DIAGNOSIS — E11.3533 TYPE 2 DIABETES MELLITUS WITH BOTH EYES AFFECTED BY PROLIFERATIVE RETINOPATHY AND TRACTION RETINAL DETACHMENTS NOT INVOLVING MACULAE, WITH LONG-TERM CURRENT USE OF INSULIN (HCC): ICD-10-CM

## 2022-08-31 DIAGNOSIS — Z12.5 SCREENING FOR PROSTATE CANCER: ICD-10-CM

## 2022-08-31 DIAGNOSIS — Z13.29 SCREENING FOR THYROID DISORDER: ICD-10-CM

## 2022-08-31 DIAGNOSIS — N18.4 ANEMIA OF CHRONIC RENAL FAILURE, STAGE 4 (SEVERE) (HCC): ICD-10-CM

## 2022-08-31 DIAGNOSIS — Z00.00 ANNUAL PHYSICAL EXAM: ICD-10-CM

## 2022-08-31 DIAGNOSIS — E11.21 DIABETIC NEPHROPATHY ASSOCIATED WITH TYPE 2 DIABETES MELLITUS (HCC): ICD-10-CM

## 2022-08-31 DIAGNOSIS — N18.4 CHRONIC KIDNEY DISEASE, STAGE IV (SEVERE) (HCC): Primary | ICD-10-CM

## 2022-08-31 LAB
ALBUMIN SERPL-MCNC: 2.7 G/DL (ref 3.4–5)
ALBUMIN/GLOB SERPL: 0.6 {RATIO} (ref 1–2)
ALP LIVER SERPL-CCNC: 71 U/L
ALT SERPL-CCNC: 48 U/L
ANION GAP SERPL CALC-SCNC: 5 MMOL/L (ref 0–18)
AST SERPL-CCNC: 39 U/L (ref 15–37)
BASOPHILS # BLD AUTO: 0.05 X10(3) UL (ref 0–0.2)
BASOPHILS NFR BLD AUTO: 0.6 %
BILIRUB SERPL-MCNC: 0.6 MG/DL (ref 0.1–2)
BILIRUB UR QL: NEGATIVE
BUN BLD-MCNC: 58 MG/DL (ref 7–18)
BUN/CREAT SERPL: 14.7 (ref 10–20)
CALCIUM BLD-MCNC: 8.6 MG/DL (ref 8.5–10.1)
CHLORIDE SERPL-SCNC: 109 MMOL/L (ref 98–112)
CHOLEST SERPL-MCNC: 168 MG/DL (ref ?–200)
CLARITY UR: CLEAR
CO2 SERPL-SCNC: 28 MMOL/L (ref 21–32)
COLOR UR: YELLOW
COMPLEXED PSA SERPL-MCNC: 3.46 NG/ML (ref ?–4)
CREAT BLD-MCNC: 3.94 MG/DL
CREAT UR-SCNC: 123 MG/DL
DEPRECATED RDW RBC AUTO: 48.3 FL (ref 35.1–46.3)
EOSINOPHIL # BLD AUTO: 0.41 X10(3) UL (ref 0–0.7)
EOSINOPHIL NFR BLD AUTO: 4.9 %
ERYTHROCYTE [DISTWIDTH] IN BLOOD BY AUTOMATED COUNT: 15.2 % (ref 11–15)
EST. AVERAGE GLUCOSE BLD GHB EST-MCNC: 171 MG/DL (ref 68–126)
FASTING PATIENT LIPID ANSWER: YES
FASTING STATUS PATIENT QL REPORTED: YES
GFR SERPLBLD BASED ON 1.73 SQ M-ARVRAT: 15 ML/MIN/1.73M2 (ref 60–?)
GLOBULIN PLAS-MCNC: 4.5 G/DL (ref 2.8–4.4)
GLUCOSE BLD-MCNC: 101 MG/DL (ref 70–99)
GLUCOSE UR-MCNC: NEGATIVE MG/DL
HBA1C MFR BLD: 7.6 % (ref ?–5.7)
HCT VFR BLD AUTO: 30.6 %
HDLC SERPL-MCNC: 43 MG/DL (ref 40–59)
HGB BLD-MCNC: 9.5 G/DL
IMM GRANULOCYTES # BLD AUTO: 0.05 X10(3) UL (ref 0–1)
IMM GRANULOCYTES NFR BLD: 0.6 %
KETONES UR-MCNC: NEGATIVE MG/DL
LDLC SERPL CALC-MCNC: 105 MG/DL (ref ?–100)
LEUKOCYTE ESTERASE UR QL STRIP.AUTO: NEGATIVE
LYMPHOCYTES # BLD AUTO: 1.5 X10(3) UL (ref 1–4)
LYMPHOCYTES NFR BLD AUTO: 17.8 %
MCH RBC QN AUTO: 26.7 PG (ref 26–34)
MCHC RBC AUTO-ENTMCNC: 31 G/DL (ref 31–37)
MCV RBC AUTO: 86 FL
MICROALBUMIN UR-MCNC: 450 MG/DL
MICROALBUMIN/CREAT 24H UR-RTO: 3658.5 UG/MG (ref ?–30)
MONOCYTES # BLD AUTO: 0.56 X10(3) UL (ref 0.1–1)
MONOCYTES NFR BLD AUTO: 6.6 %
NEUTROPHILS # BLD AUTO: 5.86 X10 (3) UL (ref 1.5–7.7)
NEUTROPHILS # BLD AUTO: 5.86 X10(3) UL (ref 1.5–7.7)
NEUTROPHILS NFR BLD AUTO: 69.5 %
NITRITE UR QL STRIP.AUTO: NEGATIVE
NONHDLC SERPL-MCNC: 125 MG/DL (ref ?–130)
OSMOLALITY SERPL CALC.SUM OF ELEC: 310 MOSM/KG (ref 275–295)
PH UR: 6 [PH] (ref 5–8)
PLATELET # BLD AUTO: 198 10(3)UL (ref 150–450)
POTASSIUM SERPL-SCNC: 3.4 MMOL/L (ref 3.5–5.1)
PROT SERPL-MCNC: 7.2 G/DL (ref 6.4–8.2)
PROT UR-MCNC: >=300 MG/DL
RBC # BLD AUTO: 3.56 X10(6)UL
SODIUM SERPL-SCNC: 142 MMOL/L (ref 136–145)
SP GR UR STRIP: 1.02 (ref 1–1.03)
T4 FREE SERPL-MCNC: 0.9 NG/DL (ref 0.8–1.7)
TRIGL SERPL-MCNC: 109 MG/DL (ref 30–149)
TSI SER-ACNC: 3.76 MIU/ML (ref 0.36–3.74)
UROBILINOGEN UR STRIP-ACNC: 0.2
VLDLC SERPL CALC-MCNC: 18 MG/DL (ref 0–30)
WBC # BLD AUTO: 8.4 X10(3) UL (ref 4–11)

## 2022-08-31 PROCEDURE — 85014 HEMATOCRIT: CPT

## 2022-08-31 PROCEDURE — 85025 COMPLETE CBC W/AUTO DIFF WBC: CPT

## 2022-08-31 PROCEDURE — 83036 HEMOGLOBIN GLYCOSYLATED A1C: CPT

## 2022-08-31 PROCEDURE — 84439 ASSAY OF FREE THYROXINE: CPT

## 2022-08-31 PROCEDURE — 3060F POS MICROALBUMINURIA REV: CPT | Performed by: INTERNAL MEDICINE

## 2022-08-31 PROCEDURE — 85018 HEMOGLOBIN: CPT

## 2022-08-31 PROCEDURE — 82043 UR ALBUMIN QUANTITATIVE: CPT

## 2022-08-31 PROCEDURE — 36415 COLL VENOUS BLD VENIPUNCTURE: CPT

## 2022-08-31 PROCEDURE — 84443 ASSAY THYROID STIM HORMONE: CPT

## 2022-08-31 PROCEDURE — 81015 MICROSCOPIC EXAM OF URINE: CPT

## 2022-08-31 PROCEDURE — 82570 ASSAY OF URINE CREATININE: CPT

## 2022-08-31 PROCEDURE — 80061 LIPID PANEL: CPT

## 2022-08-31 PROCEDURE — 3051F HG A1C>EQUAL 7.0%<8.0%: CPT | Performed by: INTERNAL MEDICINE

## 2022-08-31 PROCEDURE — 96372 THER/PROPH/DIAG INJ SC/IM: CPT

## 2022-08-31 PROCEDURE — 81001 URINALYSIS AUTO W/SCOPE: CPT

## 2022-08-31 PROCEDURE — 80053 COMPREHEN METABOLIC PANEL: CPT

## 2022-08-31 RX ORDER — PANTOPRAZOLE SODIUM 40 MG/1
40 TABLET, DELAYED RELEASE ORAL NIGHTLY
Qty: 90 TABLET | Refills: 3 | Status: SHIPPED | OUTPATIENT
Start: 2022-08-31

## 2022-08-31 RX ORDER — FUROSEMIDE 40 MG/1
40 TABLET ORAL
Qty: 45 TABLET | Refills: 3 | Status: SHIPPED | OUTPATIENT
Start: 2022-08-31

## 2022-08-31 NOTE — TELEPHONE ENCOUNTER
I reviewed the blood test from 8/31/2022  A1c stable 7.6  CMP: Slight bump in creatinine 3.94, but patient was fasting  Lipid panel:   PSA within normal limits  Hemoglobin stable 9.5  Microalbumin creatinine ratio 3685.5  Subclinical hypothyroidism - but very close to the reference range    Refilled his pantoprazole nightly, furosemide every 48 hours at the current dosages. Will continue monitoring his fatigue. He is obtaining Aranesp injections through nephrology clinic, anemia could be contributing. We will closely watch his thyroid levels as well.   No new medication changes otherwise    I discussed this with the patient

## 2022-09-06 RX ORDER — LINAGLIPTIN 5 MG/1
5 TABLET, FILM COATED ORAL DAILY
Qty: 90 TABLET | Refills: 1 | Status: SHIPPED | OUTPATIENT
Start: 2022-09-06 | End: 2023-02-08

## 2022-09-08 ENCOUNTER — TELEPHONE (OUTPATIENT)
Dept: PHYSICAL THERAPY | Facility: HOSPITAL | Age: 77
End: 2022-09-08

## 2022-09-09 ENCOUNTER — OFFICE VISIT (OUTPATIENT)
Dept: PHYSICAL THERAPY | Facility: HOSPITAL | Age: 77
End: 2022-09-09
Attending: INTERNAL MEDICINE
Payer: MEDICARE

## 2022-09-09 PROCEDURE — 97163 PT EVAL HIGH COMPLEX 45 MIN: CPT

## 2022-09-12 ENCOUNTER — OFFICE VISIT (OUTPATIENT)
Dept: PHYSICAL THERAPY | Facility: HOSPITAL | Age: 77
End: 2022-09-12
Attending: INTERNAL MEDICINE
Payer: MEDICARE

## 2022-09-12 PROCEDURE — 97110 THERAPEUTIC EXERCISES: CPT

## 2022-09-12 PROCEDURE — 97140 MANUAL THERAPY 1/> REGIONS: CPT

## 2022-09-14 ENCOUNTER — APPOINTMENT (OUTPATIENT)
Dept: PHYSICAL THERAPY | Facility: HOSPITAL | Age: 77
End: 2022-09-14
Attending: INTERNAL MEDICINE
Payer: MEDICARE

## 2022-09-19 ENCOUNTER — OFFICE VISIT (OUTPATIENT)
Dept: PHYSICAL THERAPY | Facility: HOSPITAL | Age: 77
End: 2022-09-19
Attending: INTERNAL MEDICINE
Payer: MEDICARE

## 2022-09-19 PROCEDURE — 97110 THERAPEUTIC EXERCISES: CPT

## 2022-09-19 PROCEDURE — 97140 MANUAL THERAPY 1/> REGIONS: CPT

## 2022-09-21 ENCOUNTER — OFFICE VISIT (OUTPATIENT)
Dept: PHYSICAL THERAPY | Facility: HOSPITAL | Age: 77
End: 2022-09-21
Attending: INTERNAL MEDICINE
Payer: MEDICARE

## 2022-09-21 PROCEDURE — 97140 MANUAL THERAPY 1/> REGIONS: CPT

## 2022-09-21 PROCEDURE — 97110 THERAPEUTIC EXERCISES: CPT

## 2022-09-26 ENCOUNTER — OFFICE VISIT (OUTPATIENT)
Dept: PHYSICAL THERAPY | Facility: HOSPITAL | Age: 77
End: 2022-09-26
Attending: INTERNAL MEDICINE
Payer: MEDICARE

## 2022-09-26 PROCEDURE — 97140 MANUAL THERAPY 1/> REGIONS: CPT

## 2022-09-26 PROCEDURE — 97110 THERAPEUTIC EXERCISES: CPT

## 2022-09-28 ENCOUNTER — NURSE ONLY (OUTPATIENT)
Dept: HEMATOLOGY/ONCOLOGY | Facility: HOSPITAL | Age: 77
End: 2022-09-28
Attending: INTERNAL MEDICINE

## 2022-09-28 ENCOUNTER — OFFICE VISIT (OUTPATIENT)
Dept: PHYSICAL THERAPY | Facility: HOSPITAL | Age: 77
End: 2022-09-28
Attending: INTERNAL MEDICINE
Payer: MEDICARE

## 2022-09-28 VITALS — SYSTOLIC BLOOD PRESSURE: 143 MMHG | RESPIRATION RATE: 16 BRPM | HEART RATE: 66 BPM | DIASTOLIC BLOOD PRESSURE: 59 MMHG

## 2022-09-28 DIAGNOSIS — N18.4 ANEMIA OF CHRONIC RENAL FAILURE, STAGE 4 (SEVERE) (HCC): ICD-10-CM

## 2022-09-28 DIAGNOSIS — D63.1 ANEMIA OF CHRONIC RENAL FAILURE, STAGE 4 (SEVERE) (HCC): ICD-10-CM

## 2022-09-28 DIAGNOSIS — N18.4 CHRONIC KIDNEY DISEASE, STAGE IV (SEVERE) (HCC): Primary | ICD-10-CM

## 2022-09-28 LAB
HCT VFR BLD AUTO: 27.5 %
HGB BLD-MCNC: 9.1 G/DL
IRON SATN MFR SERPL: 24 %
IRON SERPL-MCNC: 62 UG/DL
TIBC SERPL-MCNC: 255 UG/DL (ref 240–450)
TRANSFERRIN SERPL-MCNC: 171 MG/DL (ref 200–360)

## 2022-09-28 PROCEDURE — 83540 ASSAY OF IRON: CPT

## 2022-09-28 PROCEDURE — 85018 HEMOGLOBIN: CPT

## 2022-09-28 PROCEDURE — 84466 ASSAY OF TRANSFERRIN: CPT

## 2022-09-28 PROCEDURE — 96372 THER/PROPH/DIAG INJ SC/IM: CPT

## 2022-09-28 PROCEDURE — 85014 HEMATOCRIT: CPT

## 2022-09-28 PROCEDURE — 36415 COLL VENOUS BLD VENIPUNCTURE: CPT

## 2022-09-28 PROCEDURE — 97110 THERAPEUTIC EXERCISES: CPT

## 2022-09-28 PROCEDURE — 97140 MANUAL THERAPY 1/> REGIONS: CPT

## 2022-10-03 ENCOUNTER — OFFICE VISIT (OUTPATIENT)
Dept: PHYSICAL THERAPY | Facility: HOSPITAL | Age: 77
End: 2022-10-03
Attending: INTERNAL MEDICINE
Payer: MEDICARE

## 2022-10-03 PROCEDURE — 97110 THERAPEUTIC EXERCISES: CPT

## 2022-10-03 PROCEDURE — 97140 MANUAL THERAPY 1/> REGIONS: CPT

## 2022-10-04 ENCOUNTER — TELEPHONE (OUTPATIENT)
Dept: INTERNAL MEDICINE CLINIC | Facility: CLINIC | Age: 77
End: 2022-10-04

## 2022-10-04 DIAGNOSIS — I10 ESSENTIAL HYPERTENSION: ICD-10-CM

## 2022-10-06 RX ORDER — CARVEDILOL 12.5 MG/1
12.5 TABLET ORAL 2 TIMES DAILY WITH MEALS
Qty: 180 TABLET | Refills: 3 | Status: SHIPPED | OUTPATIENT
Start: 2022-10-06 | End: 2022-10-07

## 2022-10-07 ENCOUNTER — PATIENT MESSAGE (OUTPATIENT)
Dept: INTERNAL MEDICINE CLINIC | Facility: CLINIC | Age: 77
End: 2022-10-07

## 2022-10-07 ENCOUNTER — TELEPHONE (OUTPATIENT)
Dept: ENDOCRINOLOGY CLINIC | Facility: CLINIC | Age: 77
End: 2022-10-07

## 2022-10-07 DIAGNOSIS — I10 ESSENTIAL HYPERTENSION: ICD-10-CM

## 2022-10-07 RX ORDER — CARVEDILOL 12.5 MG/1
12.5 TABLET ORAL 2 TIMES DAILY WITH MEALS
Qty: 180 TABLET | Refills: 3 | Status: SHIPPED | OUTPATIENT
Start: 2022-10-07

## 2022-10-07 RX ORDER — CARVEDILOL 12.5 MG/1
12.5 TABLET ORAL 2 TIMES DAILY WITH MEALS
Qty: 14 TABLET | Refills: 0 | Status: SHIPPED | OUTPATIENT
Start: 2022-10-07 | End: 2022-10-07

## 2022-10-07 NOTE — TELEPHONE ENCOUNTER
[facility]. Completed referral order form for CGM supplies. Faxed with MD signature.

## 2022-10-07 NOTE — TELEPHONE ENCOUNTER
Initial RX from Dr. He Gray yesterday. Resent per written order. Also sent #[phone] to [facility] in case patient needs short rx to hold him over.

## 2022-10-26 ENCOUNTER — NURSE ONLY (OUTPATIENT)
Dept: HEMATOLOGY/ONCOLOGY | Facility: HOSPITAL | Age: 77
End: 2022-10-26
Attending: INTERNAL MEDICINE
Payer: MEDICARE

## 2022-10-26 VITALS — SYSTOLIC BLOOD PRESSURE: 136 MMHG | DIASTOLIC BLOOD PRESSURE: 45 MMHG | HEART RATE: 69 BPM

## 2022-10-26 DIAGNOSIS — N18.4 ANEMIA OF CHRONIC RENAL FAILURE, STAGE 4 (SEVERE) (HCC): ICD-10-CM

## 2022-10-26 DIAGNOSIS — D63.1 ANEMIA OF CHRONIC RENAL FAILURE, STAGE 4 (SEVERE) (HCC): ICD-10-CM

## 2022-10-26 DIAGNOSIS — N18.4 CHRONIC KIDNEY DISEASE, STAGE IV (SEVERE) (HCC): Primary | ICD-10-CM

## 2022-10-26 LAB
HCT VFR BLD AUTO: 28.7 %
HGB BLD-MCNC: 9.3 G/DL

## 2022-10-26 PROCEDURE — 85014 HEMATOCRIT: CPT

## 2022-10-26 PROCEDURE — 96372 THER/PROPH/DIAG INJ SC/IM: CPT

## 2022-10-26 PROCEDURE — 36415 COLL VENOUS BLD VENIPUNCTURE: CPT

## 2022-10-26 PROCEDURE — 85018 HEMOGLOBIN: CPT

## 2022-10-26 NOTE — PROGRESS NOTES
Patient to infusion for Aranesp injection. Lab Results   Component Value Date    HGB 9.3 (L) 10/26/2022    HCT 28.7 (L) 10/26/2022      Aranesp given in left upper arm, patient tolerated well. Discharged ambulating with a cane.

## 2022-10-27 DIAGNOSIS — N18.4 CKD (CHRONIC KIDNEY DISEASE) STAGE 4, GFR 15-29 ML/MIN (HCC): Primary | ICD-10-CM

## 2022-10-27 NOTE — TELEPHONE ENCOUNTER
Dr. Bond Alu    Patient added to schedule tomorrow. He denies constipation. Has had loose stools x2-3 days.     Thank you
Dr. Flores Rico    Can I add him between your 1pm and 1:30pm tomorrow since your 1:30pm and 2:00 are colon screenings?     Thank you
Dr. Tamar Dominguez    Patient called because of \"Torn tissues in rectum\"  Reports it has been going on for 2 weeks. Very difficult to get details of exactly what is going on from the patient to triage his symptoms.   He just keeps saying he needs to be checked
I spoke to the patient. He [place] 1:15 [place]. Malorie [place] [facility] [place] with detailed directions. Aware this is an overbook. I am just waiting for spot to be opened on schedule to add patient to appointment desk.
Ok to add at that time spot
Patient added to schedule.     Future Appointments   Date Time Provider Etelvina Fajardo   6/24/2021  1:15 PM Sariah Grewal MD [facility] Jimbosaranya HUNTER
Patient states \" I am having problems with my hemorrhoids. \" Please call [phone]
RN to see if I can add the pt to tomorrow afternoon, I will need to examine the area to determine what is causing. Please also see if he is constipated.
Home

## 2022-11-02 DIAGNOSIS — E78.2 MIXED HYPERLIPIDEMIA: ICD-10-CM

## 2022-11-02 RX ORDER — ATORVASTATIN CALCIUM 40 MG/1
40 TABLET, FILM COATED ORAL NIGHTLY
Qty: 90 TABLET | Refills: 3 | Status: CANCELLED | OUTPATIENT
Start: 2022-11-02

## 2022-11-02 NOTE — TELEPHONE ENCOUNTER
Pt requests new Rx from Dr Abdoul Silva is  sent to [facility] for 90 day supply of   Atorvastatin 40 mg - takes one tablet daily

## 2022-11-11 ENCOUNTER — OFFICE VISIT (OUTPATIENT)
Dept: ENDOCRINOLOGY CLINIC | Facility: CLINIC | Age: 77
End: 2022-11-11
Payer: MEDICARE

## 2022-11-11 VITALS
DIASTOLIC BLOOD PRESSURE: 70 MMHG | BODY MASS INDEX: 30 KG/M2 | HEART RATE: 69 BPM | SYSTOLIC BLOOD PRESSURE: 149 MMHG | WEIGHT: 180 LBS

## 2022-11-11 DIAGNOSIS — E78.5 DYSLIPIDEMIA: ICD-10-CM

## 2022-11-11 DIAGNOSIS — E11.69 TYPE 2 DIABETES MELLITUS WITH OTHER SPECIFIED COMPLICATION, WITH LONG-TERM CURRENT USE OF INSULIN (HCC): Primary | ICD-10-CM

## 2022-11-11 DIAGNOSIS — E11.42 DIABETIC POLYNEUROPATHY ASSOCIATED WITH TYPE 2 DIABETES MELLITUS (HCC): ICD-10-CM

## 2022-11-11 DIAGNOSIS — Z79.4 TYPE 2 DIABETES MELLITUS WITH OTHER SPECIFIED COMPLICATION, WITH LONG-TERM CURRENT USE OF INSULIN (HCC): Primary | ICD-10-CM

## 2022-11-11 LAB
CARTRIDGE LOT#: ABNORMAL NUMERIC
GLUCOSE BLOOD: 132
HEMOGLOBIN A1C: 6.4 % (ref 4.3–5.6)
TEST STRIP LOT #: NORMAL NUMERIC

## 2022-11-11 PROCEDURE — 3078F DIAST BP <80 MM HG: CPT | Performed by: INTERNAL MEDICINE

## 2022-11-11 PROCEDURE — 82947 ASSAY GLUCOSE BLOOD QUANT: CPT | Performed by: INTERNAL MEDICINE

## 2022-11-11 PROCEDURE — 83036 HEMOGLOBIN GLYCOSYLATED A1C: CPT | Performed by: INTERNAL MEDICINE

## 2022-11-11 PROCEDURE — 3077F SYST BP >= 140 MM HG: CPT | Performed by: INTERNAL MEDICINE

## 2022-11-11 PROCEDURE — 3044F HG A1C LEVEL LT 7.0%: CPT | Performed by: INTERNAL MEDICINE

## 2022-11-11 PROCEDURE — 99214 OFFICE O/P EST MOD 30 MIN: CPT | Performed by: INTERNAL MEDICINE

## 2022-11-23 ENCOUNTER — NURSE ONLY (OUTPATIENT)
Dept: HEMATOLOGY/ONCOLOGY | Facility: HOSPITAL | Age: 77
End: 2022-11-23
Attending: INTERNAL MEDICINE
Payer: MEDICARE

## 2022-11-23 DIAGNOSIS — E11.69 TYPE 2 DIABETES MELLITUS WITH OTHER SPECIFIED COMPLICATION, WITH LONG-TERM CURRENT USE OF INSULIN (HCC): ICD-10-CM

## 2022-11-23 DIAGNOSIS — Z79.4 TYPE 2 DIABETES MELLITUS WITH OTHER SPECIFIED COMPLICATION, WITH LONG-TERM CURRENT USE OF INSULIN (HCC): ICD-10-CM

## 2022-11-23 DIAGNOSIS — N18.4 ANEMIA OF CHRONIC RENAL FAILURE, STAGE 4 (SEVERE) (HCC): ICD-10-CM

## 2022-11-23 DIAGNOSIS — E78.5 DYSLIPIDEMIA: ICD-10-CM

## 2022-11-23 DIAGNOSIS — N18.4 CHRONIC KIDNEY DISEASE, STAGE IV (SEVERE) (HCC): Primary | ICD-10-CM

## 2022-11-23 DIAGNOSIS — N18.4 CKD (CHRONIC KIDNEY DISEASE) STAGE 4, GFR 15-29 ML/MIN (HCC): ICD-10-CM

## 2022-11-23 DIAGNOSIS — D63.1 ANEMIA OF CHRONIC RENAL FAILURE, STAGE 4 (SEVERE) (HCC): ICD-10-CM

## 2022-11-23 LAB
ALBUMIN SERPL-MCNC: 2.8 G/DL (ref 3.4–5)
ANION GAP SERPL CALC-SCNC: 4 MMOL/L (ref 0–18)
BUN BLD-MCNC: 66 MG/DL (ref 7–18)
BUN/CREAT SERPL: 13.2 (ref 10–20)
CALCIUM BLD-MCNC: 8.8 MG/DL (ref 8.5–10.1)
CHLORIDE SERPL-SCNC: 110 MMOL/L (ref 98–112)
CHOLEST SERPL-MCNC: 157 MG/DL (ref ?–200)
CO2 SERPL-SCNC: 29 MMOL/L (ref 21–32)
CREAT BLD-MCNC: 5.01 MG/DL
DEPRECATED HBV CORE AB SER IA-ACNC: 589.1 NG/ML
GFR SERPLBLD BASED ON 1.73 SQ M-ARVRAT: 11 ML/MIN/1.73M2 (ref 60–?)
GLUCOSE BLD-MCNC: 90 MG/DL (ref 70–99)
HCT VFR BLD AUTO: 29.4 %
HDLC SERPL-MCNC: 36 MG/DL (ref 40–59)
HGB BLD-MCNC: 9.7 G/DL
IRON SATN MFR SERPL: 28 %
IRON SERPL-MCNC: 63 UG/DL
LDLC SERPL CALC-MCNC: 96 MG/DL (ref ?–100)
NONHDLC SERPL-MCNC: 121 MG/DL (ref ?–130)
OSMOLALITY SERPL CALC.SUM OF ELEC: 315 MOSM/KG (ref 275–295)
PHOSPHATE SERPL-MCNC: 3.6 MG/DL (ref 2.5–4.9)
POTASSIUM SERPL-SCNC: 3.7 MMOL/L (ref 3.5–5.1)
SODIUM SERPL-SCNC: 143 MMOL/L (ref 136–145)
TIBC SERPL-MCNC: 226 UG/DL (ref 240–450)
TRANSFERRIN SERPL-MCNC: 152 MG/DL (ref 200–360)
TRIGL SERPL-MCNC: 143 MG/DL (ref 30–149)
VLDLC SERPL CALC-MCNC: 24 MG/DL (ref 0–30)

## 2022-11-23 PROCEDURE — 85014 HEMATOCRIT: CPT

## 2022-11-23 PROCEDURE — 83540 ASSAY OF IRON: CPT

## 2022-11-23 PROCEDURE — 80069 RENAL FUNCTION PANEL: CPT

## 2022-11-23 PROCEDURE — 82728 ASSAY OF FERRITIN: CPT

## 2022-11-23 PROCEDURE — 96372 THER/PROPH/DIAG INJ SC/IM: CPT

## 2022-11-23 PROCEDURE — 80061 LIPID PANEL: CPT

## 2022-11-23 PROCEDURE — 85018 HEMOGLOBIN: CPT

## 2022-11-23 PROCEDURE — 36415 COLL VENOUS BLD VENIPUNCTURE: CPT

## 2022-11-23 PROCEDURE — 84466 ASSAY OF TRANSFERRIN: CPT

## 2022-11-25 ENCOUNTER — TELEPHONE (OUTPATIENT)
Dept: ENDOCRINOLOGY CLINIC | Facility: CLINIC | Age: 77
End: 2022-11-25

## 2022-11-25 NOTE — TELEPHONE ENCOUNTER
Spoke to patient regarding test results. Patient verbalized understnading.  Routing to Dr. Brendon Castro

## 2022-11-25 NOTE — TELEPHONE ENCOUNTER
Cholesterol is okay  Kidney function is low and lower than before Cr is high  Please call his nephrologist for next steps   Endo staff: Also, please call Dr. Dioni Bonilla office to let them know in case any urgent attention is needed  Thanks

## 2022-11-27 NOTE — PATIENT INSTRUCTIONS
Were seen in clinic today for follow-up. Today, we focused on the recent office visits with your subspecialist.  We need to keep a close eye on your kidney function as it was elevated compared to prior assessments. Today we focused on the dizziness, unsteadiness. - This may be related to spinal stenosis at multiple levels of the neck and back  - We will obtain an MRI of the brain and neck as this may also be contributing to your chronic pain  - Continue with your home exercises to keep the pain under good control  - Use Tylenol on an as-needed basis  - Avoid the use of NSAIDs such as ibuprofen/Motrin/naproxen/Aleve as this can affect her kidneys. Continue following closely with Dr. Tika Rolon  -We will follow-up on the lab results ordered by Dr. Tika Rolon as we are keeping close eye on your kidney function  -Maintain good hydration    Keep a close eye on your blood pressure  - Your home blood pressure readings are at goal    You may be due for diabetic eye examination with Dr. Evonne Burnham.   We will request these records    We are happy to hear that you are leg pain has improved with physical therapy, we can monitor this for now   -If there is a flareup, notify us as we should consider escalation of treatment    Return to clinic in 3 months for follow-up

## 2022-11-29 ENCOUNTER — OFFICE VISIT (OUTPATIENT)
Dept: INTERNAL MEDICINE CLINIC | Facility: CLINIC | Age: 77
End: 2022-11-29
Payer: MEDICARE

## 2022-11-29 VITALS
TEMPERATURE: 98 F | OXYGEN SATURATION: 99 % | DIASTOLIC BLOOD PRESSURE: 66 MMHG | HEART RATE: 76 BPM | BODY MASS INDEX: 29.32 KG/M2 | WEIGHT: 176 LBS | HEIGHT: 65 IN | SYSTOLIC BLOOD PRESSURE: 164 MMHG

## 2022-11-29 DIAGNOSIS — I10 ESSENTIAL HYPERTENSION: ICD-10-CM

## 2022-11-29 DIAGNOSIS — G47.33 OSA (OBSTRUCTIVE SLEEP APNEA): ICD-10-CM

## 2022-11-29 DIAGNOSIS — N18.4 CKD (CHRONIC KIDNEY DISEASE) STAGE 4, GFR 15-29 ML/MIN (HCC): Primary | ICD-10-CM

## 2022-11-29 DIAGNOSIS — E11.3533 TYPE 2 DIABETES MELLITUS WITH BOTH EYES AFFECTED BY PROLIFERATIVE RETINOPATHY AND TRACTION RETINAL DETACHMENTS NOT INVOLVING MACULAE, WITH LONG-TERM CURRENT USE OF INSULIN (HCC): ICD-10-CM

## 2022-11-29 DIAGNOSIS — Z79.4 TYPE 2 DIABETES MELLITUS WITH BOTH EYES AFFECTED BY PROLIFERATIVE RETINOPATHY AND TRACTION RETINAL DETACHMENTS NOT INVOLVING MACULAE, WITH LONG-TERM CURRENT USE OF INSULIN (HCC): ICD-10-CM

## 2022-11-29 DIAGNOSIS — M10.9 GOUT, UNSPECIFIED CAUSE, UNSPECIFIED CHRONICITY, UNSPECIFIED SITE: ICD-10-CM

## 2022-11-29 DIAGNOSIS — D64.9 CHRONIC ANEMIA: ICD-10-CM

## 2022-11-29 DIAGNOSIS — D63.1 ANEMIA DUE TO CHRONIC KIDNEY DISEASE, UNSPECIFIED CKD STAGE: ICD-10-CM

## 2022-11-29 DIAGNOSIS — R26.81 UNSTEADY GAIT: ICD-10-CM

## 2022-11-29 DIAGNOSIS — R42 DIZZINESS: ICD-10-CM

## 2022-11-29 DIAGNOSIS — N18.4 CHRONIC KIDNEY DISEASE, STAGE IV (SEVERE) (HCC): ICD-10-CM

## 2022-11-29 DIAGNOSIS — M54.12 CERVICAL RADICULOPATHY: ICD-10-CM

## 2022-11-29 DIAGNOSIS — N18.9 ANEMIA DUE TO CHRONIC KIDNEY DISEASE, UNSPECIFIED CKD STAGE: ICD-10-CM

## 2022-11-29 DIAGNOSIS — I50.32 CHRONIC DIASTOLIC CONGESTIVE HEART FAILURE (HCC): ICD-10-CM

## 2022-11-29 DIAGNOSIS — E11.21 DIABETIC NEPHROPATHY ASSOCIATED WITH TYPE 2 DIABETES MELLITUS (HCC): ICD-10-CM

## 2022-11-29 DIAGNOSIS — E78.2 MIXED HYPERLIPIDEMIA: Primary | ICD-10-CM

## 2022-11-29 DIAGNOSIS — M48.02 CERVICAL SPINAL STENOSIS: ICD-10-CM

## 2022-11-29 PROCEDURE — 3008F BODY MASS INDEX DOCD: CPT | Performed by: INTERNAL MEDICINE

## 2022-11-29 PROCEDURE — 99214 OFFICE O/P EST MOD 30 MIN: CPT | Performed by: INTERNAL MEDICINE

## 2022-11-29 PROCEDURE — 1125F AMNT PAIN NOTED PAIN PRSNT: CPT | Performed by: INTERNAL MEDICINE

## 2022-11-29 PROCEDURE — 3077F SYST BP >= 140 MM HG: CPT | Performed by: INTERNAL MEDICINE

## 2022-11-29 PROCEDURE — 3078F DIAST BP <80 MM HG: CPT | Performed by: INTERNAL MEDICINE

## 2022-12-21 ENCOUNTER — NURSE ONLY (OUTPATIENT)
Dept: HEMATOLOGY/ONCOLOGY | Facility: HOSPITAL | Age: 77
End: 2022-12-21
Attending: INTERNAL MEDICINE
Payer: MEDICARE

## 2022-12-21 ENCOUNTER — TELEPHONE (OUTPATIENT)
Dept: INTERNAL MEDICINE CLINIC | Facility: CLINIC | Age: 77
End: 2022-12-21

## 2022-12-21 DIAGNOSIS — N18.4 CHRONIC KIDNEY DISEASE, STAGE IV (SEVERE) (HCC): Primary | ICD-10-CM

## 2022-12-21 DIAGNOSIS — D63.1 ANEMIA OF CHRONIC RENAL FAILURE, STAGE 4 (SEVERE) (HCC): ICD-10-CM

## 2022-12-21 DIAGNOSIS — R42 DIZZINESS: ICD-10-CM

## 2022-12-21 DIAGNOSIS — R26.81 UNSTEADY GAIT: ICD-10-CM

## 2022-12-21 DIAGNOSIS — N18.4 ANEMIA OF CHRONIC RENAL FAILURE, STAGE 4 (SEVERE) (HCC): ICD-10-CM

## 2022-12-21 DIAGNOSIS — N18.4 CKD (CHRONIC KIDNEY DISEASE) STAGE 4, GFR 15-29 ML/MIN (HCC): ICD-10-CM

## 2022-12-21 DIAGNOSIS — N18.9 ANEMIA DUE TO CHRONIC KIDNEY DISEASE, UNSPECIFIED CKD STAGE: ICD-10-CM

## 2022-12-21 DIAGNOSIS — D63.1 ANEMIA DUE TO CHRONIC KIDNEY DISEASE, UNSPECIFIED CKD STAGE: ICD-10-CM

## 2022-12-21 LAB
ALBUMIN SERPL-MCNC: 2.8 G/DL (ref 3.4–5)
ANION GAP SERPL CALC-SCNC: 5 MMOL/L (ref 0–18)
BASOPHILS # BLD AUTO: 0.06 X10(3) UL (ref 0–0.2)
BASOPHILS NFR BLD AUTO: 0.7 %
BUN BLD-MCNC: 87 MG/DL (ref 7–18)
BUN/CREAT SERPL: 15.6 (ref 10–20)
CALCIUM BLD-MCNC: 8.7 MG/DL (ref 8.5–10.1)
CHLORIDE SERPL-SCNC: 111 MMOL/L (ref 98–112)
CO2 SERPL-SCNC: 26 MMOL/L (ref 21–32)
CREAT BLD-MCNC: 5.59 MG/DL
DEPRECATED RDW RBC AUTO: 46.8 FL (ref 35.1–46.3)
EOSINOPHIL # BLD AUTO: 0.37 X10(3) UL (ref 0–0.7)
EOSINOPHIL NFR BLD AUTO: 4.6 %
ERYTHROCYTE [DISTWIDTH] IN BLOOD BY AUTOMATED COUNT: 14.6 % (ref 11–15)
GFR SERPLBLD BASED ON 1.73 SQ M-ARVRAT: 10 ML/MIN/1.73M2 (ref 60–?)
GLUCOSE BLD-MCNC: 230 MG/DL (ref 70–99)
HCT VFR BLD AUTO: 30.7 %
HGB BLD-MCNC: 10 G/DL
IMM GRANULOCYTES # BLD AUTO: 0.03 X10(3) UL (ref 0–1)
IMM GRANULOCYTES NFR BLD: 0.4 %
LYMPHOCYTES # BLD AUTO: 1.39 X10(3) UL (ref 1–4)
LYMPHOCYTES NFR BLD AUTO: 17.3 %
MAGNESIUM SERPL-MCNC: 1.9 MG/DL (ref 1.6–2.6)
MCH RBC QN AUTO: 28.5 PG (ref 26–34)
MCHC RBC AUTO-ENTMCNC: 32.6 G/DL (ref 31–37)
MCV RBC AUTO: 87.5 FL
MONOCYTES # BLD AUTO: 0.47 X10(3) UL (ref 0.1–1)
MONOCYTES NFR BLD AUTO: 5.8 %
NEUTROPHILS # BLD AUTO: 5.73 X10 (3) UL (ref 1.5–7.7)
NEUTROPHILS # BLD AUTO: 5.73 X10(3) UL (ref 1.5–7.7)
NEUTROPHILS NFR BLD AUTO: 71.2 %
OSMOLALITY SERPL CALC.SUM OF ELEC: 328 MOSM/KG (ref 275–295)
PHOSPHATE SERPL-MCNC: 3.1 MG/DL (ref 2.5–4.9)
PLATELET # BLD AUTO: 164 10(3)UL (ref 150–450)
POTASSIUM SERPL-SCNC: 3.7 MMOL/L (ref 3.5–5.1)
PTH-INTACT SERPL-MCNC: 409.5 PG/ML (ref 18.5–88)
RBC # BLD AUTO: 3.51 X10(6)UL
SODIUM SERPL-SCNC: 142 MMOL/L (ref 136–145)
WBC # BLD AUTO: 8.1 X10(3) UL (ref 4–11)

## 2022-12-21 PROCEDURE — 80069 RENAL FUNCTION PANEL: CPT

## 2022-12-21 PROCEDURE — 83970 ASSAY OF PARATHORMONE: CPT

## 2022-12-21 PROCEDURE — 96372 THER/PROPH/DIAG INJ SC/IM: CPT

## 2022-12-21 PROCEDURE — 83735 ASSAY OF MAGNESIUM: CPT

## 2022-12-21 PROCEDURE — 85018 HEMOGLOBIN: CPT

## 2022-12-21 PROCEDURE — 85014 HEMATOCRIT: CPT

## 2022-12-21 PROCEDURE — 36415 COLL VENOUS BLD VENIPUNCTURE: CPT

## 2022-12-21 PROCEDURE — 85025 COMPLETE CBC W/AUTO DIFF WBC: CPT

## 2022-12-21 NOTE — TELEPHONE ENCOUNTER
To Dr. Lilia Macias:  Please review CBC results--ok to wait for Dr. Miroslava Rush?   Component      Latest Ref Rng & Units 12/21/2022   WBC      4.0 - 11.0 x10(3) uL 8.1   RBC      3.80 - 5.80 x10(6)uL 3.51 (L)   Hemoglobin      13.0 - 17.5 g/dL 10.0 (L)   Hematocrit      39.0 - 53.0 % 30.7 (L)   MCV      80.0 - 100.0 fL 87.5   MCH      26.0 - 34.0 pg 28.5   MCHC      31.0 - 37.0 g/dL 32.6   RDW-SD      35.1 - 46.3 fL 46.8 (H)   RDW      11.0 - 15.0 % 14.6   Platelet Count      283.2 - 450.0 10(3)uL 164.0   Prelim Neutrophil Abs      1.50 - 7.70 x10 (3) uL 5.73   Neutrophils Absolute      1.50 - 7.70 x10(3) uL 5.73   Lymphocytes Absolute      1.00 - 4.00 x10(3) uL 1.39   Monocytes Absolute      0.10 - 1.00 x10(3) uL 0.47   Eosinophils Absolute      0.00 - 0.70 x10(3) uL 0.37   Basophils Absolute      0.00 - 0.20 x10(3) uL 0.06   Immature Granulocyte Absolute      0.00 - 1.00 x10(3) uL 0.03   Neutrophils %      % 71.2   Lymphocytes %      % 17.3   Monocytes %      % 5.8   Eosinophils %      % 4.6   Basophils %      % 0.7   Immature Granulocyte %      % 0.4

## 2022-12-22 DIAGNOSIS — N17.9 AKI (ACUTE KIDNEY INJURY) (HCC): Primary | ICD-10-CM

## 2022-12-22 NOTE — TELEPHONE ENCOUNTER
Please notify patient that I reviewed the blood work from 12/21    Labs  Kidney function remains high with creatinine 5.59.   This is suggestive of advanced kidney disease we discussed in clinic  Is anemia is about the same, again related to his chronic kidney disease        Recommendations  Should maintain good hydration, continue following up with Dr. Richi Landin of nephrology very closely  I will follow-up on the MRI results once completed next week  No other new recommendations for now

## 2022-12-29 ENCOUNTER — HOSPITAL ENCOUNTER (OUTPATIENT)
Dept: MRI IMAGING | Facility: HOSPITAL | Age: 77
Discharge: HOME | End: 2022-12-29
Attending: INTERNAL MEDICINE
Payer: MEDICARE

## 2022-12-29 DIAGNOSIS — R42 DIZZINESS: ICD-10-CM

## 2022-12-29 DIAGNOSIS — M48.02 CERVICAL SPINAL STENOSIS: ICD-10-CM

## 2022-12-29 DIAGNOSIS — M54.12 CERVICAL RADICULOPATHY: ICD-10-CM

## 2022-12-29 DIAGNOSIS — R26.81 UNSTEADY GAIT: ICD-10-CM

## 2022-12-29 PROCEDURE — 70551 MRI BRAIN STEM W/O DYE: CPT | Performed by: INTERNAL MEDICINE

## 2022-12-29 PROCEDURE — 72141 MRI NECK SPINE W/O DYE: CPT | Performed by: INTERNAL MEDICINE

## 2023-01-02 ENCOUNTER — TELEPHONE (OUTPATIENT)
Dept: INTERNAL MEDICINE CLINIC | Facility: CLINIC | Age: 78
End: 2023-01-02

## 2023-01-02 DIAGNOSIS — Z98.890 HISTORY OF LAMINECTOMY: ICD-10-CM

## 2023-01-02 DIAGNOSIS — M47.812 CERVICAL SPONDYLOSIS: Primary | ICD-10-CM

## 2023-01-02 NOTE — TELEPHONE ENCOUNTER
Received ophthalmology report from Dr. Dolly Coffman 2/8/2022    Postop right 1/18/2022, left CE 12/21/2021  Visual acuity improved after surgery of cataract

## 2023-01-04 NOTE — TELEPHONE ENCOUNTER
I did call the patient with MRI results:    MRI Brain -moderate chronic microvascular vessel ischemic changes bilaterally in cerebral hemispheres, basal ganlionic regions, and thalami. No acute changes    MRI Cervical spine:   - reviewed with patient    Recommendations  NSGY evaluation with Dr. Beatris Major to assist in management given progressive neurological symptoms despite PT x 6 weeks. Aware that if surgery indicated, he does have a higher risk given his comorbidities.  He will make an appointment with Dr. Beatris Major

## 2023-01-16 ENCOUNTER — TELEPHONE (OUTPATIENT)
Dept: INTERNAL MEDICINE CLINIC | Facility: CLINIC | Age: 78
End: 2023-01-16

## 2023-01-16 DIAGNOSIS — Z79.4 INSULIN DEPENDENT TYPE 2 DIABETES MELLITUS (HCC): ICD-10-CM

## 2023-01-16 DIAGNOSIS — E11.9 INSULIN DEPENDENT TYPE 2 DIABETES MELLITUS (HCC): ICD-10-CM

## 2023-01-16 NOTE — TELEPHONE ENCOUNTER
Pt called, requesting NEW RX for insulin (patient has new mailorder):  Levemir, Qty 90 w/refills   Pt will use [facility] for this medication  Tasked to [employer]

## 2023-01-16 NOTE — TELEPHONE ENCOUNTER
Discussed with pt that since he sees Dr. Mary Reyes for diabetes management, refills should be obtained through his endocrinologist's office. Pt became upset and verbalized that these medications are prescribed by his primary care doctor. Pt requesting Levemir refill be sent by Dr. Gifford Hamman. Please advise (pended for review).

## 2023-01-17 RX ORDER — INSULIN DETEMIR 100 [IU]/ML
40 INJECTION, SOLUTION SUBCUTANEOUS NIGHTLY
Qty: 45 ML | Refills: 2 | OUTPATIENT
Start: 2023-01-17

## 2023-01-17 NOTE — TELEPHONE ENCOUNTER
I have not prescribed this medication before. Endocrinology sees him, and refills should be through Dr. Wade keita as she will be adjusting the insulins as needed moving forward (previously, it was Dr. Elodia Reynoso). To the pool, please forward this message to endocrinology. Last office visit with Dr. Lauren Zuluaga 11/11/2022 where she did adjust the Levemir.

## 2023-01-18 ENCOUNTER — APPOINTMENT (OUTPATIENT)
Dept: HEMATOLOGY/ONCOLOGY | Facility: HOSPITAL | Age: 78
End: 2023-01-18
Attending: INTERNAL MEDICINE
Payer: MEDICARE

## 2023-01-18 ENCOUNTER — OFFICE VISIT (OUTPATIENT)
Dept: SURGERY | Facility: CLINIC | Age: 78
End: 2023-01-18
Payer: COMMERCIAL

## 2023-01-18 VITALS
DIASTOLIC BLOOD PRESSURE: 68 MMHG | WEIGHT: 172 LBS | BODY MASS INDEX: 28.66 KG/M2 | SYSTOLIC BLOOD PRESSURE: 120 MMHG | HEIGHT: 65 IN | HEART RATE: 69 BPM

## 2023-01-18 DIAGNOSIS — M79.605 PAIN IN BOTH LOWER EXTREMITIES: ICD-10-CM

## 2023-01-18 DIAGNOSIS — R20.0 LEG NUMBNESS: ICD-10-CM

## 2023-01-18 DIAGNOSIS — M79.601 PAIN IN BOTH UPPER EXTREMITIES: ICD-10-CM

## 2023-01-18 DIAGNOSIS — M54.2 CERVICALGIA: Primary | ICD-10-CM

## 2023-01-18 DIAGNOSIS — M79.602 PAIN IN BOTH UPPER EXTREMITIES: ICD-10-CM

## 2023-01-18 DIAGNOSIS — R51.9 CHRONIC NONINTRACTABLE HEADACHE, UNSPECIFIED HEADACHE TYPE: ICD-10-CM

## 2023-01-18 DIAGNOSIS — R20.0 ARM NUMBNESS: ICD-10-CM

## 2023-01-18 DIAGNOSIS — R26.89 IMBALANCE: ICD-10-CM

## 2023-01-18 DIAGNOSIS — G95.9 CERVICAL MYELOPATHY (HCC): ICD-10-CM

## 2023-01-18 DIAGNOSIS — R29.898 COMPLAINTS OF LEG WEAKNESS: ICD-10-CM

## 2023-01-18 DIAGNOSIS — M79.604 PAIN IN BOTH LOWER EXTREMITIES: ICD-10-CM

## 2023-01-18 DIAGNOSIS — G89.29 CHRONIC NONINTRACTABLE HEADACHE, UNSPECIFIED HEADACHE TYPE: ICD-10-CM

## 2023-01-18 DIAGNOSIS — Z98.890 HISTORY OF CERVICAL SPINAL SURGERY: ICD-10-CM

## 2023-01-18 PROCEDURE — 1125F AMNT PAIN NOTED PAIN PRSNT: CPT | Performed by: NEUROLOGICAL SURGERY

## 2023-01-18 PROCEDURE — 3078F DIAST BP <80 MM HG: CPT | Performed by: NEUROLOGICAL SURGERY

## 2023-01-18 PROCEDURE — 99204 OFFICE O/P NEW MOD 45 MIN: CPT | Performed by: NEUROLOGICAL SURGERY

## 2023-01-18 PROCEDURE — 3008F BODY MASS INDEX DOCD: CPT | Performed by: NEUROLOGICAL SURGERY

## 2023-01-18 PROCEDURE — 3074F SYST BP LT 130 MM HG: CPT | Performed by: NEUROLOGICAL SURGERY

## 2023-01-18 RX ORDER — INSULIN DETEMIR 100 [IU]/ML
30 INJECTION, SOLUTION SUBCUTANEOUS DAILY
Qty: 27 ML | Refills: 0 | Status: SHIPPED | OUTPATIENT
Start: 2023-01-18 | End: 2023-01-23

## 2023-01-18 NOTE — PROGRESS NOTES
Pt states he feels like he is going to fall. Pt states he has neck and back pain. Pt states he has numbness and tingling going down both legs. Pt states he also has weakness in both legs. Pt did have PT- did help but did not last per pt.  Pt had an MRI

## 2023-01-18 NOTE — PROGRESS NOTES
Neurosurgery staff    Patient seen and examined. Please also see SOFÍA Pelletier Rounds note for further information. This gentleman has a fairly complex history. He underwent anterior and posterior cervical surgery at [address] [place] [address], at [facility]. His presenting symptoms at that point were most consistent with central cord syndrome, per his history. Since then, he has had pain in his head and his neck. He has left greater than right arm pain. He endorses fine motor difficulties, balance difficulties, and urinary urgency. His symptoms been worse over the past 6 months. The balance difficulty is most bothersome. Medical history is significant for chronic renal insufficiency and congestive heart failure, among others. On examination, strength is 5/5. Reflexes are 1+ in the uppers and 2+ in the lowers. No Beti's or clonus. MRI of the cervical spine was performed December 29. This demonstrates evidence of laminectomy over the whole cervical spine. There appears to be increased DELMAR. He has evidence of DISH. 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 At the T2 level, there is some increased signal in the spinal cord. 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 There is a concern for craniocervical junction stenosis, as well as increased T2 signal in the spinal cord at the T1-2 level, which is of unknown significance at this point. I would like to obtain a CT scan of the cervical spine, as well as MRI of the thoracic and lumbar spine, and cervical flexion-extension x-rays. He will follow-up in several weeks, once this imaging work-up is complete.     He may be a good candidate for dynamic bracing trial.

## 2023-01-19 ENCOUNTER — NURSE ONLY (OUTPATIENT)
Dept: HEMATOLOGY/ONCOLOGY | Facility: HOSPITAL | Age: 78
End: 2023-01-19
Attending: INTERNAL MEDICINE
Payer: MEDICARE

## 2023-01-19 VITALS
OXYGEN SATURATION: 99 % | DIASTOLIC BLOOD PRESSURE: 48 MMHG | RESPIRATION RATE: 16 BRPM | HEART RATE: 66 BPM | SYSTOLIC BLOOD PRESSURE: 127 MMHG

## 2023-01-19 DIAGNOSIS — D63.1 ANEMIA OF CHRONIC RENAL FAILURE, STAGE 4 (SEVERE) (HCC): ICD-10-CM

## 2023-01-19 DIAGNOSIS — N18.4 CHRONIC KIDNEY DISEASE, STAGE IV (SEVERE) (HCC): Primary | ICD-10-CM

## 2023-01-19 DIAGNOSIS — N18.4 CKD (CHRONIC KIDNEY DISEASE) STAGE 4, GFR 15-29 ML/MIN (HCC): Primary | ICD-10-CM

## 2023-01-19 DIAGNOSIS — N18.4 ANEMIA OF CHRONIC RENAL FAILURE, STAGE 4 (SEVERE) (HCC): ICD-10-CM

## 2023-01-19 DIAGNOSIS — N17.9 AKI (ACUTE KIDNEY INJURY) (HCC): ICD-10-CM

## 2023-01-19 LAB
ALBUMIN SERPL-MCNC: 2.8 G/DL (ref 3.4–5)
ANION GAP SERPL CALC-SCNC: 8 MMOL/L (ref 0–18)
BUN BLD-MCNC: 74 MG/DL (ref 7–18)
BUN/CREAT SERPL: 13.1 (ref 10–20)
CALCIUM BLD-MCNC: 8.7 MG/DL (ref 8.5–10.1)
CHLORIDE SERPL-SCNC: 111 MMOL/L (ref 98–112)
CO2 SERPL-SCNC: 24 MMOL/L (ref 21–32)
CREAT BLD-MCNC: 5.66 MG/DL
GFR SERPLBLD BASED ON 1.73 SQ M-ARVRAT: 10 ML/MIN/1.73M2 (ref 60–?)
GLUCOSE BLD-MCNC: 243 MG/DL (ref 70–99)
HCT VFR BLD AUTO: 30.7 %
HGB BLD-MCNC: 9.8 G/DL
OSMOLALITY SERPL CALC.SUM OF ELEC: 326 MOSM/KG (ref 275–295)
PHOSPHATE SERPL-MCNC: 3.4 MG/DL (ref 2.5–4.9)
POTASSIUM SERPL-SCNC: 3.4 MMOL/L (ref 3.5–5.1)
SODIUM SERPL-SCNC: 143 MMOL/L (ref 136–145)

## 2023-01-19 PROCEDURE — 85014 HEMATOCRIT: CPT

## 2023-01-19 PROCEDURE — 85018 HEMOGLOBIN: CPT

## 2023-01-19 PROCEDURE — 80069 RENAL FUNCTION PANEL: CPT

## 2023-01-19 PROCEDURE — 96372 THER/PROPH/DIAG INJ SC/IM: CPT

## 2023-01-19 PROCEDURE — 36415 COLL VENOUS BLD VENIPUNCTURE: CPT

## 2023-01-19 NOTE — PROGRESS NOTES
Patient to infusion for Aranesp injection. Lab Results   Component Value Date    HGB 9.8 (L) 01/19/2023    HCT 30.7 (L) 01/19/2023      Patient with no complaints today. Appeared to tolerated injection well, administered per parameters. Discharged ambulatory with a cane. Aware of future appointments made. Care Plan  Problem:  Heme Imbalance: monitoring    Related to:  Disease process    Interventions:  HH monitoring as ordered  Aranesp injections as ordered    Evaluation:  Will continue to monitor for S/S of anemia with each visit. Will continue therapy per parameters.

## 2023-01-19 NOTE — TELEPHONE ENCOUNTER
Endo staff please let the patient know that since I see him for Diabetes, I can send his refills  Levemir 30 daily sent   Thanks

## 2023-01-19 NOTE — TELEPHONE ENCOUNTER
Called patient's cell phone, no answer. Left message to call back. Called home phone and talked to Mol and relayed below message.

## 2023-01-23 ENCOUNTER — TELEPHONE (OUTPATIENT)
Dept: ENDOCRINOLOGY CLINIC | Facility: CLINIC | Age: 78
End: 2023-01-23

## 2023-01-23 RX ORDER — INSULIN DETEMIR 100 [IU]/ML
30 INJECTION, SOLUTION SUBCUTANEOUS DAILY
Qty: 27 ML | Refills: 0 | Status: SHIPPED | OUTPATIENT
Start: 2023-01-23

## 2023-01-23 NOTE — TELEPHONE ENCOUNTER
Called [facility] and RN was told that levemir is on long term back order and was told they don't know when it will be available again. RN called patient's local [facility] and they have it in stock but only dispensing it for patients who go to their pharmacy. RN obtained consent from patient to forward rx to [facility] and was advised to give office a call if he is not able to obtain it. Pt and wife voiced understanding.

## 2023-01-23 NOTE — TELEPHONE ENCOUNTER
Pt is calling because his insulin is on backorder in his pharmacy.  Pt only has about 3 days left     insulin detemir (LEVEMIR FLEXTOUCH) 100 UNIT/ML Subcutaneous Solution Pen-injector

## 2023-01-27 ENCOUNTER — HOSPITAL ENCOUNTER (OUTPATIENT)
Dept: GENERAL RADIOLOGY | Facility: HOSPITAL | Age: 78
Discharge: HOME | End: 2023-01-27
Attending: STUDENT IN AN ORGANIZED HEALTH CARE EDUCATION/TRAINING PROGRAM
Payer: MEDICARE

## 2023-01-27 ENCOUNTER — HOSPITAL ENCOUNTER (OUTPATIENT)
Dept: CT IMAGING | Facility: HOSPITAL | Age: 78
Discharge: HOME | End: 2023-01-27
Attending: STUDENT IN AN ORGANIZED HEALTH CARE EDUCATION/TRAINING PROGRAM
Payer: MEDICARE

## 2023-01-27 DIAGNOSIS — R51.9 CHRONIC NONINTRACTABLE HEADACHE, UNSPECIFIED HEADACHE TYPE: ICD-10-CM

## 2023-01-27 DIAGNOSIS — R20.0 ARM NUMBNESS: ICD-10-CM

## 2023-01-27 DIAGNOSIS — G95.9 CERVICAL MYELOPATHY (HCC): ICD-10-CM

## 2023-01-27 DIAGNOSIS — M79.602 PAIN IN BOTH UPPER EXTREMITIES: ICD-10-CM

## 2023-01-27 DIAGNOSIS — G89.29 CHRONIC NONINTRACTABLE HEADACHE, UNSPECIFIED HEADACHE TYPE: ICD-10-CM

## 2023-01-27 DIAGNOSIS — M79.601 PAIN IN BOTH UPPER EXTREMITIES: ICD-10-CM

## 2023-01-27 DIAGNOSIS — M54.2 CERVICALGIA: ICD-10-CM

## 2023-01-27 DIAGNOSIS — Z98.890 HISTORY OF CERVICAL SPINAL SURGERY: ICD-10-CM

## 2023-01-27 PROCEDURE — 72052 X-RAY EXAM NECK SPINE 6/>VWS: CPT | Performed by: STUDENT IN AN ORGANIZED HEALTH CARE EDUCATION/TRAINING PROGRAM

## 2023-01-27 PROCEDURE — 72125 CT NECK SPINE W/O DYE: CPT | Performed by: STUDENT IN AN ORGANIZED HEALTH CARE EDUCATION/TRAINING PROGRAM

## 2023-01-30 ENCOUNTER — OFFICE VISIT (OUTPATIENT)
Dept: ENDOCRINOLOGY CLINIC | Facility: CLINIC | Age: 78
End: 2023-01-30

## 2023-01-30 ENCOUNTER — TELEPHONE (OUTPATIENT)
Dept: ENDOCRINOLOGY CLINIC | Facility: CLINIC | Age: 78
End: 2023-01-30

## 2023-01-30 VITALS
BODY MASS INDEX: 30 KG/M2 | WEIGHT: 180 LBS | SYSTOLIC BLOOD PRESSURE: 156 MMHG | DIASTOLIC BLOOD PRESSURE: 62 MMHG | HEART RATE: 68 BPM

## 2023-01-30 DIAGNOSIS — E78.5 DYSLIPIDEMIA: ICD-10-CM

## 2023-01-30 DIAGNOSIS — E16.2 HYPOGLYCEMIA: ICD-10-CM

## 2023-01-30 DIAGNOSIS — E11.69 TYPE 2 DIABETES MELLITUS WITH OTHER SPECIFIED COMPLICATION, WITH LONG-TERM CURRENT USE OF INSULIN (HCC): Primary | ICD-10-CM

## 2023-01-30 DIAGNOSIS — Z79.4 TYPE 2 DIABETES MELLITUS WITH OTHER SPECIFIED COMPLICATION, WITH LONG-TERM CURRENT USE OF INSULIN (HCC): Primary | ICD-10-CM

## 2023-01-30 LAB
CARTRIDGE LOT#: ABNORMAL NUMERIC
GLUCOSE BLOOD: 233
HEMOGLOBIN A1C: 6.4 % (ref 4.3–5.6)
TEST STRIP LOT #: NORMAL NUMERIC

## 2023-01-30 PROCEDURE — 3077F SYST BP >= 140 MM HG: CPT | Performed by: INTERNAL MEDICINE

## 2023-01-30 PROCEDURE — 3044F HG A1C LEVEL LT 7.0%: CPT | Performed by: INTERNAL MEDICINE

## 2023-01-30 PROCEDURE — 82947 ASSAY GLUCOSE BLOOD QUANT: CPT | Performed by: INTERNAL MEDICINE

## 2023-01-30 PROCEDURE — 3078F DIAST BP <80 MM HG: CPT | Performed by: INTERNAL MEDICINE

## 2023-01-30 PROCEDURE — 99214 OFFICE O/P EST MOD 30 MIN: CPT | Performed by: INTERNAL MEDICINE

## 2023-01-30 PROCEDURE — 83036 HEMOGLOBIN GLYCOSYLATED A1C: CPT | Performed by: INTERNAL MEDICINE

## 2023-02-02 NOTE — TELEPHONE ENCOUNTER
Called patient, verbalized understanding and acknowledged. Patient will update us in 1 week and will decrease lantus to 10 units. Patient aware to re-check BG when hypoglycemic. Went over hypoglycemic management.

## 2023-02-02 NOTE — TELEPHONE ENCOUNTER
Dr. Sulaiman Herring,    Please advise     LOV 1/30/23:  Lantus 28 --> 18   daily  linagliptin 5 mg daily    BG in morning and fasting  1/30:  AM: 50-- patient stated he did not eat enough during dinner. Patient ate bananas. Patient did not re-check bg afterwards. 1/31:  AM: 102    2/1:  AM: 154    Today:   AM:112    Patient states he only checks BG once daily. Patient states that he checks x2 if he feels symptoms, otherwise he will only check x1 daily.

## 2023-02-02 NOTE — TELEPHONE ENCOUNTER
Decrease lantus to 10 units   Please review hypoglycemia management in detail including re checking Bg to make sure they come up   Should please call us with Bg update in one week, sooner if Bg under 80    Please Fu with the patient in one week if we do not hear from him

## 2023-02-07 ENCOUNTER — HOSPITAL ENCOUNTER (OUTPATIENT)
Dept: MRI IMAGING | Facility: HOSPITAL | Age: 78
Discharge: HOME | End: 2023-02-07
Attending: STUDENT IN AN ORGANIZED HEALTH CARE EDUCATION/TRAINING PROGRAM
Payer: MEDICARE

## 2023-02-07 DIAGNOSIS — R26.89 IMBALANCE: ICD-10-CM

## 2023-02-07 DIAGNOSIS — R20.0 LEG NUMBNESS: ICD-10-CM

## 2023-02-07 DIAGNOSIS — M79.602 PAIN IN BOTH UPPER EXTREMITIES: ICD-10-CM

## 2023-02-07 DIAGNOSIS — R20.0 ARM NUMBNESS: ICD-10-CM

## 2023-02-07 DIAGNOSIS — M79.605 PAIN IN BOTH LOWER EXTREMITIES: ICD-10-CM

## 2023-02-07 DIAGNOSIS — R29.898 COMPLAINTS OF LEG WEAKNESS: ICD-10-CM

## 2023-02-07 DIAGNOSIS — M79.601 PAIN IN BOTH UPPER EXTREMITIES: ICD-10-CM

## 2023-02-07 DIAGNOSIS — M79.604 PAIN IN BOTH LOWER EXTREMITIES: ICD-10-CM

## 2023-02-07 PROCEDURE — 72148 MRI LUMBAR SPINE W/O DYE: CPT | Performed by: STUDENT IN AN ORGANIZED HEALTH CARE EDUCATION/TRAINING PROGRAM

## 2023-02-07 PROCEDURE — 72146 MRI CHEST SPINE W/O DYE: CPT | Performed by: STUDENT IN AN ORGANIZED HEALTH CARE EDUCATION/TRAINING PROGRAM

## 2023-02-08 ENCOUNTER — TELEPHONE (OUTPATIENT)
Dept: INTERNAL MEDICINE CLINIC | Facility: CLINIC | Age: 78
End: 2023-02-08

## 2023-02-08 RX ORDER — LINAGLIPTIN 5 MG/1
5 TABLET, FILM COATED ORAL DAILY
Qty: 90 TABLET | Refills: 0 | Status: SHIPPED | OUTPATIENT
Start: 2023-02-08 | End: 2024-02-08

## 2023-02-08 NOTE — TELEPHONE ENCOUNTER
Pt called for Tradjenta prescription  Not on med list/ this will be the first time   Dr Biju Dominguez is prescribing      Tradjenta 5 mg takes 1 tablet daily   - pt has about 6 days left   Please send prescription to [facility]

## 2023-02-08 NOTE — TELEPHONE ENCOUNTER
Called patient and explained that he needs to call DR. Edda Baptiste for a refill - she prescribed it and we do not even have medication on his list - verbalized understanding

## 2023-02-15 ENCOUNTER — OFFICE VISIT (OUTPATIENT)
Dept: SURGERY | Facility: CLINIC | Age: 78
End: 2023-02-15
Payer: COMMERCIAL

## 2023-02-15 ENCOUNTER — NURSE ONLY (OUTPATIENT)
Dept: HEMATOLOGY/ONCOLOGY | Facility: HOSPITAL | Age: 78
End: 2023-02-15
Attending: INTERNAL MEDICINE
Payer: MEDICARE

## 2023-02-15 VITALS
HEIGHT: 65 IN | WEIGHT: 169 LBS | HEART RATE: 76 BPM | BODY MASS INDEX: 28.16 KG/M2 | SYSTOLIC BLOOD PRESSURE: 124 MMHG | DIASTOLIC BLOOD PRESSURE: 76 MMHG

## 2023-02-15 DIAGNOSIS — N18.4 CHRONIC KIDNEY DISEASE, STAGE IV (SEVERE) (HCC): Primary | ICD-10-CM

## 2023-02-15 DIAGNOSIS — N18.4 ANEMIA OF CHRONIC RENAL FAILURE, STAGE 4 (SEVERE) (HCC): ICD-10-CM

## 2023-02-15 DIAGNOSIS — N18.4 CKD (CHRONIC KIDNEY DISEASE) STAGE 4, GFR 15-29 ML/MIN (HCC): ICD-10-CM

## 2023-02-15 DIAGNOSIS — G95.9 CERVICAL MYELOPATHY (HCC): Primary | ICD-10-CM

## 2023-02-15 DIAGNOSIS — D63.1 ANEMIA OF CHRONIC RENAL FAILURE, STAGE 4 (SEVERE) (HCC): ICD-10-CM

## 2023-02-15 LAB
ALBUMIN SERPL-MCNC: 3.1 G/DL (ref 3.4–5)
ANION GAP SERPL CALC-SCNC: 7 MMOL/L (ref 0–18)
BUN BLD-MCNC: 99 MG/DL (ref 7–18)
BUN/CREAT SERPL: 14.8 (ref 10–20)
CALCIUM BLD-MCNC: 8.9 MG/DL (ref 8.5–10.1)
CHLORIDE SERPL-SCNC: 108 MMOL/L (ref 98–112)
CO2 SERPL-SCNC: 26 MMOL/L (ref 21–32)
CREAT BLD-MCNC: 6.68 MG/DL
GFR SERPLBLD BASED ON 1.73 SQ M-ARVRAT: 8 ML/MIN/1.73M2 (ref 60–?)
GLUCOSE BLD-MCNC: 229 MG/DL (ref 70–99)
HCT VFR BLD AUTO: 32.7 %
HGB BLD-MCNC: 10.3 G/DL
IRON SATN MFR SERPL: 40 %
IRON SERPL-MCNC: 105 UG/DL
OSMOLALITY SERPL CALC.SUM OF ELEC: 330 MOSM/KG (ref 275–295)
PHOSPHATE SERPL-MCNC: 3.8 MG/DL (ref 2.5–4.9)
POTASSIUM SERPL-SCNC: 3.8 MMOL/L (ref 3.5–5.1)
SODIUM SERPL-SCNC: 141 MMOL/L (ref 136–145)
TIBC SERPL-MCNC: 265 UG/DL (ref 240–450)
TRANSFERRIN SERPL-MCNC: 178 MG/DL (ref 200–360)

## 2023-02-15 PROCEDURE — 85018 HEMOGLOBIN: CPT

## 2023-02-15 PROCEDURE — 3008F BODY MASS INDEX DOCD: CPT | Performed by: NEUROLOGICAL SURGERY

## 2023-02-15 PROCEDURE — 1125F AMNT PAIN NOTED PAIN PRSNT: CPT | Performed by: NEUROLOGICAL SURGERY

## 2023-02-15 PROCEDURE — 96372 THER/PROPH/DIAG INJ SC/IM: CPT

## 2023-02-15 PROCEDURE — 80069 RENAL FUNCTION PANEL: CPT

## 2023-02-15 PROCEDURE — 84466 ASSAY OF TRANSFERRIN: CPT

## 2023-02-15 PROCEDURE — 3078F DIAST BP <80 MM HG: CPT | Performed by: NEUROLOGICAL SURGERY

## 2023-02-15 PROCEDURE — 3074F SYST BP LT 130 MM HG: CPT | Performed by: NEUROLOGICAL SURGERY

## 2023-02-15 PROCEDURE — 83540 ASSAY OF IRON: CPT

## 2023-02-15 PROCEDURE — 99214 OFFICE O/P EST MOD 30 MIN: CPT | Performed by: NEUROLOGICAL SURGERY

## 2023-02-15 PROCEDURE — 85014 HEMATOCRIT: CPT

## 2023-02-15 PROCEDURE — 36415 COLL VENOUS BLD VENIPUNCTURE: CPT

## 2023-02-16 ENCOUNTER — HOSPITAL ENCOUNTER (INPATIENT)
Facility: HOSPITAL | Age: 78
LOS: 4 days | Discharge: HOME HEALTH | End: 2023-02-20
Attending: INTERNAL MEDICINE | Admitting: INTERNAL MEDICINE
Payer: MEDICARE

## 2023-02-16 ENCOUNTER — TELEPHONE (OUTPATIENT)
Dept: SURGERY | Facility: CLINIC | Age: 78
End: 2023-02-16

## 2023-02-16 ENCOUNTER — HOSPITAL ENCOUNTER (INPATIENT)
Facility: HOSPITAL | Age: 78
LOS: 4 days | Discharge: HOME HEALTH | DRG: 674 | End: 2023-02-20
Attending: INTERNAL MEDICINE | Admitting: INTERNAL MEDICINE
Payer: MEDICARE

## 2023-02-16 DIAGNOSIS — N17.9 ACUTE RENAL FAILURE, UNSPECIFIED ACUTE RENAL FAILURE TYPE (HCC): Primary | ICD-10-CM

## 2023-02-16 DIAGNOSIS — N18.4 CHRONIC KIDNEY DISEASE, STAGE IV (SEVERE) (HCC): ICD-10-CM

## 2023-02-16 LAB
ALBUMIN SERPL-MCNC: 3 G/DL (ref 3.4–5)
ANION GAP SERPL CALC-SCNC: 8 MMOL/L (ref 0–18)
ANTIBODY SCREEN: NEGATIVE
BASOPHILS # BLD AUTO: 0.06 X10(3) UL (ref 0–0.2)
BASOPHILS NFR BLD AUTO: 0.7 %
BILIRUB UR QL: NEGATIVE
BUN BLD-MCNC: 100 MG/DL (ref 7–18)
BUN/CREAT SERPL: 14.7 (ref 10–20)
CALCIUM BLD-MCNC: 8.7 MG/DL (ref 8.5–10.1)
CHLORIDE SERPL-SCNC: 110 MMOL/L (ref 98–112)
CLARITY UR: CLEAR
CO2 SERPL-SCNC: 24 MMOL/L (ref 21–32)
COLOR UR: YELLOW
CREAT BLD-MCNC: 6.8 MG/DL
DEPRECATED HBV CORE AB SER IA-ACNC: 600.4 NG/ML
DEPRECATED RDW RBC AUTO: 48.7 FL (ref 35.1–46.3)
EOSINOPHIL # BLD AUTO: 0.31 X10(3) UL (ref 0–0.7)
EOSINOPHIL NFR BLD AUTO: 3.7 %
ERYTHROCYTE [DISTWIDTH] IN BLOOD BY AUTOMATED COUNT: 15 % (ref 11–15)
GFR SERPLBLD BASED ON 1.73 SQ M-ARVRAT: 8 ML/MIN/1.73M2 (ref 60–?)
GLUCOSE BLD-MCNC: 238 MG/DL (ref 70–99)
GLUCOSE BLDC GLUCOMTR-MCNC: 144 MG/DL (ref 70–99)
GLUCOSE BLDC GLUCOMTR-MCNC: 201 MG/DL (ref 70–99)
GLUCOSE BLDC GLUCOMTR-MCNC: 215 MG/DL (ref 70–99)
GLUCOSE UR-MCNC: 150 MG/DL
HBV CORE AB SERPL QL IA: NONREACTIVE
HBV SURFACE AB SER QL: NONREACTIVE
HBV SURFACE AB SERPL IA-ACNC: <3.1 MIU/ML
HBV SURFACE AG SER-ACNC: <0.1 [IU]/L
HBV SURFACE AG SERPL QL IA: NONREACTIVE
HCT VFR BLD AUTO: 31.9 %
HGB BLD-MCNC: 10.2 G/DL
IMM GRANULOCYTES # BLD AUTO: 0.07 X10(3) UL (ref 0–1)
IMM GRANULOCYTES NFR BLD: 0.8 %
IRON SATN MFR SERPL: 29 %
IRON SERPL-MCNC: 79 UG/DL
KETONES UR-MCNC: NEGATIVE MG/DL
LEUKOCYTE ESTERASE UR QL STRIP.AUTO: NEGATIVE
LYMPHOCYTES # BLD AUTO: 1.54 X10(3) UL (ref 1–4)
LYMPHOCYTES NFR BLD AUTO: 18.1 %
MAGNESIUM SERPL-MCNC: 2 MG/DL (ref 1.6–2.6)
MCH RBC QN AUTO: 28.3 PG (ref 26–34)
MCHC RBC AUTO-ENTMCNC: 32 G/DL (ref 31–37)
MCV RBC AUTO: 88.4 FL
MONOCYTES # BLD AUTO: 0.49 X10(3) UL (ref 0.1–1)
MONOCYTES NFR BLD AUTO: 5.8 %
NEUTROPHILS # BLD AUTO: 6.02 X10 (3) UL (ref 1.5–7.7)
NEUTROPHILS # BLD AUTO: 6.02 X10(3) UL (ref 1.5–7.7)
NEUTROPHILS NFR BLD AUTO: 70.9 %
NITRITE UR QL STRIP.AUTO: NEGATIVE
OSMOLALITY SERPL CALC.SUM OF ELEC: 333 MOSM/KG (ref 275–295)
PH UR: 5 [PH] (ref 5–8)
PHOSPHATE SERPL-MCNC: 4.1 MG/DL (ref 2.5–4.9)
PLATELET # BLD AUTO: 202 10(3)UL (ref 150–450)
POTASSIUM SERPL-SCNC: 3.8 MMOL/L (ref 3.5–5.1)
PROT UR-MCNC: >=500 MG/DL
PTH-INTACT SERPL-MCNC: 576.6 PG/ML (ref 18.5–88)
RBC # BLD AUTO: 3.61 X10(6)UL
RH BLOOD TYPE: POSITIVE
SARS-COV-2 RNA RESP QL NAA+PROBE: NOT DETECTED
SODIUM SERPL-SCNC: 142 MMOL/L (ref 136–145)
SODIUM SERPL-SCNC: 70 MMOL/L
SP GR UR STRIP: 1.01 (ref 1–1.03)
TIBC SERPL-MCNC: 268 UG/DL (ref 240–450)
TRANSFERRIN SERPL-MCNC: 180 MG/DL (ref 200–360)
UROBILINOGEN UR STRIP-ACNC: <2
UUN UR-MCNC: 440 MG/DL
VIT C UR-MCNC: NEGATIVE MG/DL
WBC # BLD AUTO: 8.5 X10(3) UL (ref 4–11)

## 2023-02-16 RX ORDER — NICOTINE POLACRILEX 4 MG
30 LOZENGE BUCCAL
Status: DISCONTINUED | OUTPATIENT
Start: 2023-02-16 | End: 2023-02-20

## 2023-02-16 RX ORDER — CALCIUM POLYCARBOPHIL 625 MG 625 MG/1
625 TABLET ORAL DAILY
Status: DISCONTINUED | OUTPATIENT
Start: 2023-02-17 | End: 2023-02-20

## 2023-02-16 RX ORDER — INDAPAMIDE 2.5 MG/1
2.5 TABLET, FILM COATED ORAL EVERY MORNING
Status: DISCONTINUED | OUTPATIENT
Start: 2023-02-17 | End: 2023-02-16

## 2023-02-16 RX ORDER — SODIUM PHOSPHATE, DIBASIC AND SODIUM PHOSPHATE, MONOBASIC 7; 19 G/133ML; G/133ML
1 ENEMA RECTAL ONCE AS NEEDED
Status: DISCONTINUED | OUTPATIENT
Start: 2023-02-16 | End: 2023-02-20

## 2023-02-16 RX ORDER — ONDANSETRON 2 MG/ML
4 INJECTION INTRAMUSCULAR; INTRAVENOUS EVERY 6 HOURS PRN
Status: DISCONTINUED | OUTPATIENT
Start: 2023-02-16 | End: 2023-02-20

## 2023-02-16 RX ORDER — METOCLOPRAMIDE HYDROCHLORIDE 5 MG/ML
10 INJECTION INTRAMUSCULAR; INTRAVENOUS EVERY 8 HOURS PRN
Status: DISCONTINUED | OUTPATIENT
Start: 2023-02-16 | End: 2023-02-20

## 2023-02-16 RX ORDER — FLUTICASONE FUROATE AND VILANTEROL 200; 25 UG/1; UG/1
1 POWDER RESPIRATORY (INHALATION) DAILY
Status: DISCONTINUED | OUTPATIENT
Start: 2023-02-16 | End: 2023-02-20

## 2023-02-16 RX ORDER — CETIRIZINE HYDROCHLORIDE 5 MG/1
5 TABLET ORAL DAILY
Status: DISCONTINUED | OUTPATIENT
Start: 2023-02-17 | End: 2023-02-20

## 2023-02-16 RX ORDER — SODIUM CHLORIDE 9 MG/ML
INJECTION, SOLUTION INTRAVENOUS CONTINUOUS
Status: DISCONTINUED | OUTPATIENT
Start: 2023-02-16 | End: 2023-02-17

## 2023-02-16 RX ORDER — TAMSULOSIN HYDROCHLORIDE 0.4 MG/1
0.4 CAPSULE ORAL DAILY
Status: DISCONTINUED | OUTPATIENT
Start: 2023-02-17 | End: 2023-02-20

## 2023-02-16 RX ORDER — BISACODYL 10 MG
10 SUPPOSITORY, RECTAL RECTAL
Status: DISCONTINUED | OUTPATIENT
Start: 2023-02-16 | End: 2023-02-20

## 2023-02-16 RX ORDER — AMLODIPINE BESYLATE 10 MG/1
10 TABLET ORAL DAILY
Status: DISCONTINUED | OUTPATIENT
Start: 2023-02-17 | End: 2023-02-20

## 2023-02-16 RX ORDER — NICOTINE POLACRILEX 4 MG
15 LOZENGE BUCCAL
Status: DISCONTINUED | OUTPATIENT
Start: 2023-02-16 | End: 2023-02-20

## 2023-02-16 RX ORDER — HYDRALAZINE HYDROCHLORIDE 20 MG/ML
20 INJECTION INTRAMUSCULAR; INTRAVENOUS EVERY 6 HOURS PRN
Status: DISCONTINUED | OUTPATIENT
Start: 2023-02-16 | End: 2023-02-17

## 2023-02-16 RX ORDER — ATORVASTATIN CALCIUM 40 MG/1
40 TABLET, FILM COATED ORAL NIGHTLY
Status: DISCONTINUED | OUTPATIENT
Start: 2023-02-16 | End: 2023-02-20

## 2023-02-16 RX ORDER — ALBUTEROL SULFATE 90 UG/1
2 AEROSOL, METERED RESPIRATORY (INHALATION) EVERY 4 HOURS PRN
Status: DISCONTINUED | OUTPATIENT
Start: 2023-02-16 | End: 2023-02-20

## 2023-02-16 RX ORDER — MELATONIN
1000 DAILY
Status: DISCONTINUED | OUTPATIENT
Start: 2023-02-17 | End: 2023-02-20

## 2023-02-16 RX ORDER — CARVEDILOL 12.5 MG/1
12.5 TABLET ORAL 2 TIMES DAILY WITH MEALS
Status: DISCONTINUED | OUTPATIENT
Start: 2023-02-16 | End: 2023-02-20

## 2023-02-16 RX ORDER — HEPARIN SODIUM 5000 [USP'U]/ML
5000 INJECTION, SOLUTION INTRAVENOUS; SUBCUTANEOUS EVERY 8 HOURS SCHEDULED
Status: DISCONTINUED | OUTPATIENT
Start: 2023-02-16 | End: 2023-02-20

## 2023-02-16 RX ORDER — POLYETHYLENE GLYCOL 3350 17 G/17G
17 POWDER, FOR SOLUTION ORAL DAILY PRN
Status: DISCONTINUED | OUTPATIENT
Start: 2023-02-16 | End: 2023-02-20

## 2023-02-16 RX ORDER — FLUTICASONE PROPIONATE 50 MCG
2 SPRAY, SUSPENSION (ML) NASAL DAILY PRN
Status: DISCONTINUED | OUTPATIENT
Start: 2023-02-16 | End: 2023-02-20

## 2023-02-16 RX ORDER — MELATONIN
3 NIGHTLY PRN
Status: DISCONTINUED | OUTPATIENT
Start: 2023-02-16 | End: 2023-02-20

## 2023-02-16 RX ORDER — DEXTROSE MONOHYDRATE 25 G/50ML
50 INJECTION, SOLUTION INTRAVENOUS
Status: DISCONTINUED | OUTPATIENT
Start: 2023-02-16 | End: 2023-02-20

## 2023-02-16 RX ORDER — PANTOPRAZOLE SODIUM 40 MG/1
40 TABLET, DELAYED RELEASE ORAL NIGHTLY
Status: DISCONTINUED | OUTPATIENT
Start: 2023-02-16 | End: 2023-02-20

## 2023-02-16 RX ORDER — SENNOSIDES 8.6 MG
17.2 TABLET ORAL NIGHTLY PRN
Status: DISCONTINUED | OUTPATIENT
Start: 2023-02-16 | End: 2023-02-20

## 2023-02-16 RX ORDER — ACETAMINOPHEN 500 MG
500 TABLET ORAL EVERY 4 HOURS PRN
Status: DISCONTINUED | OUTPATIENT
Start: 2023-02-16 | End: 2023-02-20

## 2023-02-16 NOTE — PROGRESS NOTES
[facility] Pharmacy Note:  Renal Dose Adjustment for Metoclopramide (REGLAN)    Frantz Weeks has been prescribed Metoclopramide (REGLAN) 10 mg every 8 hours as needed for nausea/vomiting,. CrCl cannot be calculated (Unknown ideal weight.). Calculated creatinine clearance is < 40 ml/min, therefore, the dose of Metoclopramide (REGLAN) has been changed to 5 mg every 8 hours as needed for nausea/vomiting per P&T approved protocol. Pharmacy will continue to follow, and if renal function improves, will resume the original order.        Thank you,  Joanie Richard, PharmD  2/16/2023 3:15 PM

## 2023-02-16 NOTE — PROGRESS NOTES
[facility] Pharmacy Note:  Renal Dose Adjustment for Cetirizine (ZYRTEC)    Anton Hernandez has been prescribed Cetirizine (Zyrtec) 10 mg orally daily. Estimated Creatinine Clearance: 8.3 mL/min (A) (based on SCr of 6.68 mg/dL (H)). The dose has been changed to 5 mg orally daily per P&T approved protocol. Pharmacy will follow and resume original order if renal function improves.       Thank you,  Shila Stevens, PharmD  2/16/2023  3:20 PM  [address]: [phone]

## 2023-02-16 NOTE — TELEPHONE ENCOUNTER
DME Order faxed over to [facility] along with Office note, facesheet and insurance card. Received Fax confirmation that it was received.

## 2023-02-17 ENCOUNTER — APPOINTMENT (OUTPATIENT)
Dept: INTERVENTIONAL RADIOLOGY/VASCULAR | Facility: HOSPITAL | Age: 78
End: 2023-02-17
Attending: INTERNAL MEDICINE
Payer: MEDICARE

## 2023-02-17 ENCOUNTER — APPOINTMENT (OUTPATIENT)
Dept: INTERVENTIONAL RADIOLOGY/VASCULAR | Facility: HOSPITAL | Age: 78
DRG: 674 | End: 2023-02-17
Attending: INTERNAL MEDICINE
Payer: MEDICARE

## 2023-02-17 LAB
ALBUMIN SERPL-MCNC: 2.8 G/DL (ref 3.4–5)
ANION GAP SERPL CALC-SCNC: 9 MMOL/L (ref 0–18)
BASOPHILS # BLD AUTO: 0.09 X10(3) UL (ref 0–0.2)
BASOPHILS NFR BLD AUTO: 1 %
BUN BLD-MCNC: 93 MG/DL (ref 7–18)
BUN/CREAT SERPL: 14.2 (ref 10–20)
CALCIUM BLD-MCNC: 9 MG/DL (ref 8.5–10.1)
CHLORIDE SERPL-SCNC: 111 MMOL/L (ref 98–112)
CO2 SERPL-SCNC: 24 MMOL/L (ref 21–32)
CREAT BLD-MCNC: 6.57 MG/DL
DEPRECATED RDW RBC AUTO: 47.5 FL (ref 35.1–46.3)
EOSINOPHIL # BLD AUTO: 0.37 X10(3) UL (ref 0–0.7)
EOSINOPHIL NFR BLD AUTO: 4 %
ERYTHROCYTE [DISTWIDTH] IN BLOOD BY AUTOMATED COUNT: 15 % (ref 11–15)
GFR SERPLBLD BASED ON 1.73 SQ M-ARVRAT: 8 ML/MIN/1.73M2 (ref 60–?)
GLUCOSE BLD-MCNC: 174 MG/DL (ref 70–99)
GLUCOSE BLDC GLUCOMTR-MCNC: 131 MG/DL (ref 70–99)
GLUCOSE BLDC GLUCOMTR-MCNC: 143 MG/DL (ref 70–99)
GLUCOSE BLDC GLUCOMTR-MCNC: 144 MG/DL (ref 70–99)
GLUCOSE BLDC GLUCOMTR-MCNC: 165 MG/DL (ref 70–99)
GLUCOSE BLDC GLUCOMTR-MCNC: 216 MG/DL (ref 70–99)
HCT VFR BLD AUTO: 29.8 %
HGB BLD-MCNC: 9.9 G/DL
IMM GRANULOCYTES # BLD AUTO: 0.07 X10(3) UL (ref 0–1)
IMM GRANULOCYTES NFR BLD: 0.8 %
LYMPHOCYTES # BLD AUTO: 1.63 X10(3) UL (ref 1–4)
LYMPHOCYTES NFR BLD AUTO: 17.5 %
MCH RBC QN AUTO: 28.5 PG (ref 26–34)
MCHC RBC AUTO-ENTMCNC: 33.2 G/DL (ref 31–37)
MCV RBC AUTO: 85.9 FL
MONOCYTES # BLD AUTO: 0.62 X10(3) UL (ref 0.1–1)
MONOCYTES NFR BLD AUTO: 6.7 %
NEUTROPHILS # BLD AUTO: 6.52 X10 (3) UL (ref 1.5–7.7)
NEUTROPHILS # BLD AUTO: 6.52 X10(3) UL (ref 1.5–7.7)
NEUTROPHILS NFR BLD AUTO: 70 %
OSMOLALITY SERPL CALC.SUM OF ELEC: 331 MOSM/KG (ref 275–295)
PHOSPHATE SERPL-MCNC: 4.4 MG/DL (ref 2.5–4.9)
PLATELET # BLD AUTO: 199 10(3)UL (ref 150–450)
POTASSIUM SERPL-SCNC: 3.7 MMOL/L (ref 3.5–5.1)
RBC # BLD AUTO: 3.47 X10(6)UL
SODIUM SERPL-SCNC: 144 MMOL/L (ref 136–145)
WBC # BLD AUTO: 9.3 X10(3) UL (ref 4–11)

## 2023-02-17 PROCEDURE — 5A1D70Z PERFORMANCE OF URINARY FILTRATION, INTERMITTENT, LESS THAN 6 HOURS PER DAY: ICD-10-PCS | Performed by: INTERNAL MEDICINE

## 2023-02-17 PROCEDURE — 99222 1ST HOSP IP/OBS MODERATE 55: CPT | Performed by: INTERNAL MEDICINE

## 2023-02-17 PROCEDURE — 02HV33Z INSERTION OF INFUSION DEVICE INTO SUPERIOR VENA CAVA, PERCUTANEOUS APPROACH: ICD-10-PCS | Performed by: RADIOLOGY

## 2023-02-17 PROCEDURE — 0JH63XZ INSERTION OF TUNNELED VASCULAR ACCESS DEVICE INTO CHEST SUBCUTANEOUS TISSUE AND FASCIA, PERCUTANEOUS APPROACH: ICD-10-PCS | Performed by: RADIOLOGY

## 2023-02-17 RX ORDER — HEPARIN SODIUM 1000 [USP'U]/ML
1.5 INJECTION, SOLUTION INTRAVENOUS; SUBCUTANEOUS
Status: DISCONTINUED | OUTPATIENT
Start: 2023-02-18 | End: 2023-02-20

## 2023-02-17 RX ORDER — CEFAZOLIN SODIUM/WATER 2 G/20 ML
SYRINGE (ML) INTRAVENOUS
Status: COMPLETED
Start: 2023-02-17 | End: 2023-02-17

## 2023-02-17 RX ORDER — HEPARIN SODIUM 1000 [USP'U]/ML
INJECTION, SOLUTION INTRAVENOUS; SUBCUTANEOUS
Status: COMPLETED
Start: 2023-02-17 | End: 2023-02-17

## 2023-02-17 RX ORDER — LIDOCAINE HYDROCHLORIDE 20 MG/ML
INJECTION, SOLUTION EPIDURAL; INFILTRATION; INTRACAUDAL; PERINEURAL
Status: COMPLETED
Start: 2023-02-17 | End: 2023-02-17

## 2023-02-17 RX ORDER — MIDAZOLAM HYDROCHLORIDE 1 MG/ML
INJECTION INTRAMUSCULAR; INTRAVENOUS
Status: COMPLETED
Start: 2023-02-17 | End: 2023-02-17

## 2023-02-17 RX ORDER — HYDRALAZINE HYDROCHLORIDE 25 MG/1
25 TABLET, FILM COATED ORAL EVERY 8 HOURS PRN
Status: DISCONTINUED | OUTPATIENT
Start: 2023-02-17 | End: 2023-02-20

## 2023-02-17 NOTE — DISCHARGE INSTRUCTIONS
Dialysis Seat is at [address] [facility]  [phone]  M, W, F at 3:55pm    1st session will start on Wednesday, February 22nd. You were evaluated in the hospital due to worsening kidney function requiring the need for hemodialysis. You were followed closely by your nephrologist, Dr. Merlin Singer and tolerated the scheduled hemodialysis in the hospital.  Case management has arranged for a seat Monday Wednesday Friday at [facility].    PT/OT recommended further rehab. Rehab facility was recommended, however we have arranged home health services to assist you in physical therapy and Occupational Therapy. Medication changes    -Acetaminophen 500-650 mg every 4-6 hours as needed for pain relief  - STOP any NSAIDs such as ibuprofen/Advil, Motrin, naproxen/Aleve, meloxicam.  - STOP indapamide  - STOP Furosemide  - Change cetirizine to 5 mg every 48 hours as needed for allergies.  -For more severe pain, will try cyclobenzaprine 5 mg. Try to minimize its use altogether. Take first at nighttime as this can cause sleepiness, drowsiness. If tolerated well, can use 3 times a day on an as-needed basis. Be careful with driving or operating heavy machinery on this medication    Return to clinic in 1-2 weeks for post hospital follow-up    Sometimes managing your health at home requires assistance. The [facility] team has recognized your preference to use [facility]. They can be reached by phone at [phone]. The fax number for your reference is [phone]. A representative from the home health agency will contact you or your family to schedule your first visit.

## 2023-02-17 NOTE — OCCUPATIONAL THERAPY NOTE
Orders received, chart reviewed for occupational therapy evaluation. Pt off unit for dialysis catheter placement with mild sedation, pt not available for evaluation. Will continue to follow.

## 2023-02-17 NOTE — PROGRESS NOTES
02/17/23 1626   Clinical Encounter Type   Visited With Patient not available   Routine Visit Follow-up   Patient's Supportive Strategies/Resources POA/ Advanced Directives   Referral From Nurse   Referral To      Summary:  received consult for POA/ Advanced Directives.  attempted visit in the morning but patient was in procedure, later attempt made but patient was in dialysis. Weekend  may follow up. Spiritual Care support can be requested via [address], Chaplain Resident.

## 2023-02-17 NOTE — PLAN OF CARE
Problem: Diabetes/Glucose Control  Goal: Glucose maintained within prescribed range  Description: INTERVENTIONS:  - Monitor Blood Glucose as ordered  - Assess for signs and symptoms of hyperglycemia and hypoglycemia  - Administer ordered medications to maintain glucose within target range  - Assess barriers to adequate nutritional intake and initiate nutrition consult as needed  - Instruct patient on self management of diabetes  2/17/2023 0103 by Solange Paulino RN  Outcome: Progressing    Problem: METABOLIC/FLUID AND ELECTROLYTES - ADULT  Goal: Hemodynamic stability and optimal renal function maintained  Description: INTERVENTIONS:  - Monitor labs and assess for signs and symptoms of volume excess or deficit  - Monitor intake, output and patient weight  - Monitor urine specific gravity, serum osmolarity and serum sodium as indicated or ordered  - Monitor response to interventions for patient's volume status, including labs, urine output, blood pressure (other measures as available)  - Encourage oral intake as appropriate  - Instruct patient on fluid and nutrition restrictions as appropriate  Outcome: Progressing     Problem: SKIN/TISSUE INTEGRITY - ADULT  Goal: Skin integrity remains intact  Description: INTERVENTIONS  - Assess and document risk factors for pressure ulcer development  - Assess and document skin integrity  - Monitor for areas of redness and/or skin breakdown  - Initiate interventions, skin care algorithm/standards of care as needed  Outcome: Progressing     BP elevated, MD notified, see MAR. NPO for tunneled catheter insertion today, consent signed. Wife at bedside.

## 2023-02-17 NOTE — PROCEDURES
Post-Operative Note     Procedure Date: 02/17/23    Patient Name: Roxana Sosa    Preoperative Diagnosis: End-stage renal disease requiring hemodialysis    Postoperative Diagnosis: As Above. Physician: Kimmie Grossman    Anesthesia: Moderate sedation    Procedure and Findings: Technically successful right internal jugular tunneled dialysis catheter placement. Plan:  - TDC is ready to use.      Specimen: None    Complication(s): None immediate    Estimated Blood Loss: < 20 cc    Kiera Bailey MD  Interventional Radiologist None

## 2023-02-17 NOTE — CM/SW NOTE
02/16/23 1700   CM/SW Referral Data   Referral Source Physician;Social Work (self-referral)   Reason for Referral Discharge planning   Informant Spouse/Significant Other   Access to Care / Medical Hx   Do you have a doctor or clinic where you usually go for medical care (including prenatal care)? Yes   Significant Past Medical/Mental Health Hx ESRD new   Patient Info   Advanced directives? Yes  (will be updated. Order with spiritual care)   Patient's [address]   Number of Levels in Home 1   Number of Stair in Home no   Patient lives with Spouse/Significant other   Patient Status Prior to Admission   Independent with ADLs and Mobility No   Pt. requires assistance with Ambulating  (uses cane)   Discharge Needs   Anticipated D/C needs Outpatient dialysis     Referrals to be started in [place] for [facility] OP dialysis here at [address] [facility] session will be tomorrow. / to remain available for support and/or discharge planning.       Troy PICHARDON RN [address]  RN Case Manager  [phone]

## 2023-02-17 NOTE — CM/SW NOTE
1st session today, CM to fax over site information and dialysis flow sheet. Dialysis Seat is at [address] [facility]  [phone]  M, W, F at 3:55pm    1st session will start on Monday, February 20th if discharged this weekend. Wife notified of dialysis schedule and dialysis schedule letter provided. / to remain available for support and/or discharge planning.      Tatiana Low MBA BSN RN [address]  RN Case Manager  [phone]

## 2023-02-17 NOTE — PHYSICAL THERAPY NOTE
PT orders received and chart reviewed. Patient off unit for dialysis catheter placement with mild sedation. Per RN, receiving dialysis afterwards. Will re-schedule evaluation for tomorrow.       Nadia Khan, PT, DPT  [facility]  Ext: [phone]

## 2023-02-18 LAB
ALBUMIN SERPL-MCNC: 2.9 G/DL (ref 3.4–5)
ANION GAP SERPL CALC-SCNC: 10 MMOL/L (ref 0–18)
BASOPHILS # BLD AUTO: 0.06 X10(3) UL (ref 0–0.2)
BASOPHILS NFR BLD AUTO: 0.5 %
BUN BLD-MCNC: 62 MG/DL (ref 7–18)
BUN/CREAT SERPL: 11.4 (ref 10–20)
CALCIUM BLD-MCNC: 9.3 MG/DL (ref 8.5–10.1)
CHLORIDE SERPL-SCNC: 106 MMOL/L (ref 98–112)
CO2 SERPL-SCNC: 26 MMOL/L (ref 21–32)
CREAT BLD-MCNC: 5.43 MG/DL
DEPRECATED RDW RBC AUTO: 48.8 FL (ref 35.1–46.3)
EOSINOPHIL # BLD AUTO: 0.16 X10(3) UL (ref 0–0.7)
EOSINOPHIL NFR BLD AUTO: 1.3 %
ERYTHROCYTE [DISTWIDTH] IN BLOOD BY AUTOMATED COUNT: 15.2 % (ref 11–15)
GFR SERPLBLD BASED ON 1.73 SQ M-ARVRAT: 10 ML/MIN/1.73M2 (ref 60–?)
GLUCOSE BLD-MCNC: 124 MG/DL (ref 70–99)
GLUCOSE BLDC GLUCOMTR-MCNC: 117 MG/DL (ref 70–99)
GLUCOSE BLDC GLUCOMTR-MCNC: 121 MG/DL (ref 70–99)
GLUCOSE BLDC GLUCOMTR-MCNC: 219 MG/DL (ref 70–99)
GLUCOSE BLDC GLUCOMTR-MCNC: 293 MG/DL (ref 70–99)
GLUCOSE BLDC GLUCOMTR-MCNC: 323 MG/DL (ref 70–99)
HCT VFR BLD AUTO: 32.8 %
HGB BLD-MCNC: 10.7 G/DL
IMM GRANULOCYTES # BLD AUTO: 0.09 X10(3) UL (ref 0–1)
IMM GRANULOCYTES NFR BLD: 0.7 %
LYMPHOCYTES # BLD AUTO: 1.86 X10(3) UL (ref 1–4)
LYMPHOCYTES NFR BLD AUTO: 15 %
MCH RBC QN AUTO: 28.5 PG (ref 26–34)
MCHC RBC AUTO-ENTMCNC: 32.6 G/DL (ref 31–37)
MCV RBC AUTO: 87.5 FL
MONOCYTES # BLD AUTO: 0.85 X10(3) UL (ref 0.1–1)
MONOCYTES NFR BLD AUTO: 6.8 %
NEUTROPHILS # BLD AUTO: 9.4 X10 (3) UL (ref 1.5–7.7)
NEUTROPHILS # BLD AUTO: 9.4 X10(3) UL (ref 1.5–7.7)
NEUTROPHILS NFR BLD AUTO: 75.7 %
OSMOLALITY SERPL CALC.SUM OF ELEC: 313 MOSM/KG (ref 275–295)
PHOSPHATE SERPL-MCNC: 4.3 MG/DL (ref 2.5–4.9)
PLATELET # BLD AUTO: 212 10(3)UL (ref 150–450)
POTASSIUM SERPL-SCNC: 3.9 MMOL/L (ref 3.5–5.1)
RBC # BLD AUTO: 3.75 X10(6)UL
SODIUM SERPL-SCNC: 142 MMOL/L (ref 136–145)
WBC # BLD AUTO: 12.4 X10(3) UL (ref 4–11)

## 2023-02-18 PROCEDURE — 99232 SBSQ HOSP IP/OBS MODERATE 35: CPT | Performed by: INTERNAL MEDICINE

## 2023-02-18 RX ORDER — ALBUMIN (HUMAN) 12.5 G/50ML
25 SOLUTION INTRAVENOUS
Status: DISCONTINUED | OUTPATIENT
Start: 2023-02-18 | End: 2023-02-20

## 2023-02-18 NOTE — PLAN OF CARE
Walt Graham is resting comfortably in the chair, alert and oriented x 4, on room air, NSR on tele, heparin subcutaneous for VTE prophylaxis, hydralazine for elevate blood pressure, tunneled catheter placed today, HD completed with 2L removed, hypotensive during HD, received fluid bolus, BP resolved, vss, administered IV Zofran for nausea x 1, pain controlled with acetaminophen po, safety precautions in place, call light within easy reach. Problem: Patient Centered Care  Goal: Patient preferences are identified and integrated in the patient's plan of care  Description: Interventions:  - What would you like us to know as we care for you?  I live at home with my wife  - Provide timely, complete, and accurate information to patient/family  - Incorporate patient and family knowledge, values, beliefs, and cultural backgrounds into the planning and delivery of care  - Encourage patient/family to participate in care and decision-making at the level they choose  - Honor patient and family perspectives and choices  Outcome: Progressing     Problem: Diabetes/Glucose Control  Goal: Glucose maintained within prescribed range  Description: INTERVENTIONS:  - Monitor Blood Glucose as ordered  - Assess for signs and symptoms of hyperglycemia and hypoglycemia  - Administer ordered medications to maintain glucose within target range  - Assess barriers to adequate nutritional intake and initiate nutrition consult as needed  - Instruct patient on self management of diabetes  Outcome: Progressing     Problem: Patient/Family Goals  Goal: Patient/Family Long Term Goal  Description: Patient's Long Term Goal: Increase kidney function    Interventions:  - Manage diet-renal diet  -vss  -labs  -diagnostic testing  -hemodialysis  - See additional Care Plan goals for specific interventions  Outcome: Progressing  Goal: Patient/Family Short Term Goal  Description: Patient's Short Term Goal: Go Home    Interventions:   - vss  -labs  - diagnostic testing  -take medication as prescribed  -follow up with MD as recommended  - See additional Care Plan goals for specific interventions  Outcome: Progressing     Problem: METABOLIC/FLUID AND ELECTROLYTES - ADULT  Goal: Hemodynamic stability and optimal renal function maintained  Description: INTERVENTIONS:  - Monitor labs and assess for signs and symptoms of volume excess or deficit  - Monitor intake, output and patient weight  - Monitor urine specific gravity, serum osmolarity and serum sodium as indicated or ordered  - Monitor response to interventions for patient's volume status, including labs, urine output, blood pressure (other measures as available)  - Encourage oral intake as appropriate  - Instruct patient on fluid and nutrition restrictions as appropriate  Outcome: Progressing     Problem: SKIN/TISSUE INTEGRITY - ADULT  Goal: Skin integrity remains intact  Description: INTERVENTIONS  - Assess and document risk factors for pressure ulcer development  - Assess and document skin integrity  - Monitor for areas of redness and/or skin breakdown  - Initiate interventions, skin care algorithm/standards of care as needed  Outcome: Progressing

## 2023-02-18 NOTE — PLAN OF CARE
Patient A&Ox4, up with assist. 1L off with HD today. Tolerated well. Plan to DC home tomorrow if vitals remain stable.      Problem: Patient Centered Care  Goal: Patient preferences are identified and integrated in the patient's plan of care  Description: Interventions:  - What would you like us to know as we care for you?  - Provide timely, complete, and accurate information to patient/family  - Incorporate patient and family knowledge, values, beliefs, and cultural backgrounds into the planning and delivery of care  - Encourage patient/family to participate in care and decision-making at the level they choose  - Honor patient and family perspectives and choices  Outcome: Progressing     Problem: Diabetes/Glucose Control  Goal: Glucose maintained within prescribed range  Description: INTERVENTIONS:  - Monitor Blood Glucose as ordered  - Assess for signs and symptoms of hyperglycemia and hypoglycemia  - Administer ordered medications to maintain glucose within target range  - Assess barriers to adequate nutritional intake and initiate nutrition consult as needed  - Instruct patient on self management of diabetes  Outcome: Progressing     Problem: Patient/Family Goals  Goal: Patient/Family Long Term Goal  Description: Patient's Long Term Goal:    Interventions:  -  - See additional Care Plan goals for specific interventions  Outcome: Progressing  Goal: Patient/Family Short Term Goal  Description: Patient's Short Term Goal:    Interventions:   -   - See additional Care Plan goals for specific interventions  Outcome: Progressing     Problem: METABOLIC/FLUID AND ELECTROLYTES - ADULT  Goal: Hemodynamic stability and optimal renal function maintained  Description: INTERVENTIONS:  - Monitor labs and assess for signs and symptoms of volume excess or deficit  - Monitor intake, output and patient weight  - Monitor urine specific gravity, serum osmolarity and serum sodium as indicated or ordered  - Monitor response to interventions for patient's volume status, including labs, urine output, blood pressure (other measures as available)  - Encourage oral intake as appropriate  - Instruct patient on fluid and nutrition restrictions as appropriate  Outcome: Progressing     Problem: SKIN/TISSUE INTEGRITY - ADULT  Goal: Skin integrity remains intact  Description: INTERVENTIONS  - Assess and document risk factors for pressure ulcer development  - Assess and document skin integrity  - Monitor for areas of redness and/or skin breakdown  - Initiate interventions, skin care algorithm/standards of care as needed  Outcome: Progressing

## 2023-02-18 NOTE — PROGRESS NOTES
02/17/23 1828   Clinical Encounter Type   Visited With Patient and family together   Referral From Other (Comment)  ([facility])   Referral To    Taxonomy   Intended Effects Aligning care plan with patient's values   Methods Offer emotional support; Offer spiritual/Muslim support   Interventions Acknowledge current situation; Acknowledge response to difficult experience; Active listening;Ask questions to bring forth feelings;[place]       Discussion: Pt seen beside, sitting up in bedside chair, alert; large family presence noted. Pt described having a rough day.  prayed with pt per request. Sariah Min inquired about pt's interest in completing POAH; provided copy of document for pt/ family to review. Pt reported he does not want to complete form at this time. Advised pt to request  visit if need arises.  follow-up visit available prn and can be contacted at [phone].     Savanna Esquivel, Chaplain Resident

## 2023-02-19 LAB
ALBUMIN SERPL-MCNC: 2.8 G/DL (ref 3.4–5)
ANION GAP SERPL CALC-SCNC: 11 MMOL/L (ref 0–18)
BASOPHILS # BLD AUTO: 0.05 X10(3) UL (ref 0–0.2)
BASOPHILS NFR BLD AUTO: 0.5 %
BUN BLD-MCNC: 44 MG/DL (ref 7–18)
BUN/CREAT SERPL: 8.4 (ref 10–20)
CALCIUM BLD-MCNC: 9.1 MG/DL (ref 8.5–10.1)
CHLORIDE SERPL-SCNC: 102 MMOL/L (ref 98–112)
CO2 SERPL-SCNC: 24 MMOL/L (ref 21–32)
CREAT BLD-MCNC: 5.24 MG/DL
DEPRECATED RDW RBC AUTO: 48 FL (ref 35.1–46.3)
EOSINOPHIL # BLD AUTO: 0.22 X10(3) UL (ref 0–0.7)
EOSINOPHIL NFR BLD AUTO: 2 %
ERYTHROCYTE [DISTWIDTH] IN BLOOD BY AUTOMATED COUNT: 15 % (ref 11–15)
GFR SERPLBLD BASED ON 1.73 SQ M-ARVRAT: 11 ML/MIN/1.73M2 (ref 60–?)
GLUCOSE BLD-MCNC: 154 MG/DL (ref 70–99)
GLUCOSE BLDC GLUCOMTR-MCNC: 156 MG/DL (ref 70–99)
GLUCOSE BLDC GLUCOMTR-MCNC: 225 MG/DL (ref 70–99)
GLUCOSE BLDC GLUCOMTR-MCNC: 280 MG/DL (ref 70–99)
GLUCOSE BLDC GLUCOMTR-MCNC: 318 MG/DL (ref 70–99)
HCT VFR BLD AUTO: 32.4 %
HGB BLD-MCNC: 10.7 G/DL
IMM GRANULOCYTES # BLD AUTO: 0.09 X10(3) UL (ref 0–1)
IMM GRANULOCYTES NFR BLD: 0.8 %
LYMPHOCYTES # BLD AUTO: 1.48 X10(3) UL (ref 1–4)
LYMPHOCYTES NFR BLD AUTO: 13.5 %
MCH RBC QN AUTO: 28.8 PG (ref 26–34)
MCHC RBC AUTO-ENTMCNC: 33 G/DL (ref 31–37)
MCV RBC AUTO: 87.3 FL
MONOCYTES # BLD AUTO: 0.95 X10(3) UL (ref 0.1–1)
MONOCYTES NFR BLD AUTO: 8.7 %
NEUTROPHILS # BLD AUTO: 8.15 X10 (3) UL (ref 1.5–7.7)
NEUTROPHILS # BLD AUTO: 8.15 X10(3) UL (ref 1.5–7.7)
NEUTROPHILS NFR BLD AUTO: 74.5 %
OSMOLALITY SERPL CALC.SUM OF ELEC: 298 MOSM/KG (ref 275–295)
PHOSPHATE SERPL-MCNC: 3.7 MG/DL (ref 2.5–4.9)
PLATELET # BLD AUTO: 198 10(3)UL (ref 150–450)
POTASSIUM SERPL-SCNC: 3.5 MMOL/L (ref 3.5–5.1)
RBC # BLD AUTO: 3.71 X10(6)UL
SODIUM SERPL-SCNC: 137 MMOL/L (ref 136–145)
WBC # BLD AUTO: 10.9 X10(3) UL (ref 4–11)

## 2023-02-19 RX ORDER — HEPARIN SODIUM 1000 [USP'U]/ML
1.5 INJECTION, SOLUTION INTRAVENOUS; SUBCUTANEOUS
Status: DISCONTINUED | OUTPATIENT
Start: 2023-02-20 | End: 2023-02-20

## 2023-02-19 RX ORDER — CETIRIZINE HYDROCHLORIDE 5 MG/1
5 TABLET ORAL
Qty: 45 TABLET | Refills: 0 | Status: SHIPPED | OUTPATIENT
Start: 2023-02-19 | End: 2023-05-20

## 2023-02-19 NOTE — PLAN OF CARE
Patient A&Ox4, up with assistance. Pt on bowel regimen. Successfully had bowel movement after suppository given. Patient now has relief. Plan for HD tomorrow and then DC after.     Problem: Patient Centered Care  Goal: Patient preferences are identified and integrated in the patient's plan of care  Description: Interventions:  - What would you like us to know as we care for you?  - Provide timely, complete, and accurate information to patient/family  - Incorporate patient and family knowledge, values, beliefs, and cultural backgrounds into the planning and delivery of care  - Encourage patient/family to participate in care and decision-making at the level they choose  - Honor patient and family perspectives and choices  Outcome: Progressing     Problem: Diabetes/Glucose Control  Goal: Glucose maintained within prescribed range  Description: INTERVENTIONS:  - Monitor Blood Glucose as ordered  - Assess for signs and symptoms of hyperglycemia and hypoglycemia  - Administer ordered medications to maintain glucose within target range  - Assess barriers to adequate nutritional intake and initiate nutrition consult as needed  - Instruct patient on self management of diabetes  Outcome: Progressing     Problem: Patient/Family Goals  Goal: Patient/Family Long Term Goal  Description: Patient's Long Term Goal:    Interventions:  -  - See additional Care Plan goals for specific interventions  Outcome: Progressing  Goal: Patient/Family Short Term Goal  Description: Patient's Short Term Goal:     Interventions:   -   - See additional Care Plan goals for specific interventions  Outcome: Progressing     Problem: METABOLIC/FLUID AND ELECTROLYTES - ADULT  Goal: Hemodynamic stability and optimal renal function maintained  Description: INTERVENTIONS:  - Monitor labs and assess for signs and symptoms of volume excess or deficit  - Monitor intake, output and patient weight  - Monitor urine specific gravity, serum osmolarity and serum sodium as indicated or ordered  - Monitor response to interventions for patient's volume status, including labs, urine output, blood pressure (other measures as available)  - Encourage oral intake as appropriate  - Instruct patient on fluid and nutrition restrictions as appropriate  Outcome: Progressing     Problem: SKIN/TISSUE INTEGRITY - ADULT  Goal: Skin integrity remains intact  Description: INTERVENTIONS  - Assess and document risk factors for pressure ulcer development  - Assess and document skin integrity  - Monitor for areas of redness and/or skin breakdown  - Initiate interventions, skin care algorithm/standards of care as needed  Outcome: Progressing

## 2023-02-19 NOTE — PLAN OF CARE
Prn tylenol given for pain. Cpap worn overnight. Call light in reach, needs met at this time. Problem: Patient Centered Care  Goal: Patient preferences are identified and integrated in the patient's plan of care  Description: Interventions:  - What would you like us to know as we care for you?  Chronic back pain  - Provide timely, complete, and accurate information to patient/family  - Incorporate patient and family knowledge, values, beliefs, and cultural backgrounds into the planning and delivery of care  - Encourage patient/family to participate in care and decision-making at the level they choose  - Honor patient and family perspectives and choices  Outcome: Progressing     Problem: Diabetes/Glucose Control  Goal: Glucose maintained within prescribed range  Description: INTERVENTIONS:  - Monitor Blood Glucose as ordered  - Assess for signs and symptoms of hyperglycemia and hypoglycemia  - Administer ordered medications to maintain glucose within target range  - Assess barriers to adequate nutritional intake and initiate nutrition consult as needed  - Instruct patient on self management of diabetes  Outcome: Progressing     Problem: Patient/Family Goals  Goal: Patient/Family Long Term Goal  Description: Patient's Long Term Goal:     Interventions:  -   - See additional Care Plan goals for specific interventions  Outcome: Progressing  Goal: Patient/Family Short Term Goal  Description: Patient's Short Term Goal:    Interventions:   -   - See additional Care Plan goals for specific interventions  Outcome: Progressing     Problem: METABOLIC/FLUID AND ELECTROLYTES - ADULT  Goal: Hemodynamic stability and optimal renal function maintained  Description: INTERVENTIONS:  - Monitor labs and assess for signs and symptoms of volume excess or deficit  - Monitor intake, output and patient weight  - Monitor urine specific gravity, serum osmolarity and serum sodium as indicated or ordered  - Monitor response to interventions for patient's volume status, including labs, urine output, blood pressure (other measures as available)  - Encourage oral intake as appropriate  - Instruct patient on fluid and nutrition restrictions as appropriate  Outcome: Progressing     Problem: SKIN/TISSUE INTEGRITY - ADULT  Goal: Skin integrity remains intact  Description: INTERVENTIONS  - Assess and document risk factors for pressure ulcer development  - Assess and document skin integrity  - Monitor for areas of redness and/or skin breakdown  - Initiate interventions, skin care algorithm/standards of care as needed  Outcome: Progressing Fall with Harm Risk

## 2023-02-20 VITALS
WEIGHT: 169.5 LBS | RESPIRATION RATE: 19 BRPM | BODY MASS INDEX: 28 KG/M2 | SYSTOLIC BLOOD PRESSURE: 160 MMHG | DIASTOLIC BLOOD PRESSURE: 73 MMHG | HEART RATE: 85 BPM | TEMPERATURE: 98 F | OXYGEN SATURATION: 98 %

## 2023-02-20 LAB
ALBUMIN SERPL-MCNC: 2.7 G/DL (ref 3.4–5)
ANION GAP SERPL CALC-SCNC: 8 MMOL/L (ref 0–18)
BASOPHILS # BLD AUTO: 0.05 X10(3) UL (ref 0–0.2)
BASOPHILS NFR BLD AUTO: 0.5 %
BUN BLD-MCNC: 36 MG/DL (ref 7–18)
BUN/CREAT SERPL: 9.8 (ref 10–20)
CALCIUM BLD-MCNC: 8.8 MG/DL (ref 8.5–10.1)
CHLORIDE SERPL-SCNC: 102 MMOL/L (ref 98–112)
CO2 SERPL-SCNC: 29 MMOL/L (ref 21–32)
CREAT BLD-MCNC: 3.66 MG/DL
DEPRECATED RDW RBC AUTO: 45.6 FL (ref 35.1–46.3)
EOSINOPHIL # BLD AUTO: 0.22 X10(3) UL (ref 0–0.7)
EOSINOPHIL NFR BLD AUTO: 2.4 %
ERYTHROCYTE [DISTWIDTH] IN BLOOD BY AUTOMATED COUNT: 14.6 % (ref 11–15)
GFR SERPLBLD BASED ON 1.73 SQ M-ARVRAT: 17 ML/MIN/1.73M2 (ref 60–?)
GLUCOSE BLD-MCNC: 134 MG/DL (ref 70–99)
GLUCOSE BLDC GLUCOMTR-MCNC: 141 MG/DL (ref 70–99)
HCT VFR BLD AUTO: 29.4 %
HGB BLD-MCNC: 9.7 G/DL
IMM GRANULOCYTES # BLD AUTO: 0.06 X10(3) UL (ref 0–1)
IMM GRANULOCYTES NFR BLD: 0.7 %
LYMPHOCYTES # BLD AUTO: 1.44 X10(3) UL (ref 1–4)
LYMPHOCYTES NFR BLD AUTO: 15.7 %
MCH RBC QN AUTO: 28.4 PG (ref 26–34)
MCHC RBC AUTO-ENTMCNC: 33 G/DL (ref 31–37)
MCV RBC AUTO: 86.2 FL
MONOCYTES # BLD AUTO: 0.65 X10(3) UL (ref 0.1–1)
MONOCYTES NFR BLD AUTO: 7.1 %
NEUTROPHILS # BLD AUTO: 6.77 X10 (3) UL (ref 1.5–7.7)
NEUTROPHILS # BLD AUTO: 6.77 X10(3) UL (ref 1.5–7.7)
NEUTROPHILS NFR BLD AUTO: 73.6 %
OSMOLALITY SERPL CALC.SUM OF ELEC: 298 MOSM/KG (ref 275–295)
PHOSPHATE SERPL-MCNC: 2.1 MG/DL (ref 2.5–4.9)
PLATELET # BLD AUTO: 195 10(3)UL (ref 150–450)
POTASSIUM SERPL-SCNC: 3.3 MMOL/L (ref 3.5–5.1)
RBC # BLD AUTO: 3.41 X10(6)UL
SODIUM SERPL-SCNC: 139 MMOL/L (ref 136–145)
WBC # BLD AUTO: 9.2 X10(3) UL (ref 4–11)

## 2023-02-20 PROCEDURE — 99232 SBSQ HOSP IP/OBS MODERATE 35: CPT | Performed by: INTERNAL MEDICINE

## 2023-02-20 PROCEDURE — 99239 HOSP IP/OBS DSCHRG MGMT >30: CPT | Performed by: INTERNAL MEDICINE

## 2023-02-20 RX ORDER — CYCLOBENZAPRINE HCL 5 MG
5 TABLET ORAL 3 TIMES DAILY PRN
Qty: 60 TABLET | Refills: 1 | Status: SHIPPED | OUTPATIENT
Start: 2023-02-20

## 2023-02-20 NOTE — HOME CARE LIAISON
Discussed with patient;s wife list of [facility] providers from [address], patient choice is [facility]. Agency reserved in [address] and contact information placed on AVS. Financial interest disclosure provided to patient. MANDY Mayes updated.

## 2023-02-20 NOTE — PLAN OF CARE
Patient alert and oriented. Dialysis done in the morning. Cleared for discharge home with home health and scheduled dialysis. Family at the bedside. Discharge instructions reviewed with patient and family, questions answered, verbalized understanding. Will be transported home by family. Patient received walker prior to discharge home. Problem: Patient Centered Care  Goal: Patient preferences are identified and integrated in the patient's plan of care  Description: Interventions:  - What would you like us to know as we care for you?  I do not want to go to rehab  - Provide timely, complete, and accurate information to patient/family  - Incorporate patient and family knowledge, values, beliefs, and cultural backgrounds into the planning and delivery of care  - Encourage patient/family to participate in care and decision-making at the level they choose  - Honor patient and family perspectives and choices  Outcome: Completed     Problem: Diabetes/Glucose Control  Goal: Glucose maintained within prescribed range  Description: INTERVENTIONS:  - Monitor Blood Glucose as ordered  - Assess for signs and symptoms of hyperglycemia and hypoglycemia  - Administer ordered medications to maintain glucose within target range  - Assess barriers to adequate nutritional intake and initiate nutrition consult as needed  - Instruct patient on self management of diabetes  Outcome: Completed     Problem: Patient/Family Goals  Goal: Patient/Family Long Term Goal  Description: Patient's Long Term Goal: set up dialysis    Interventions:  - catheter for dialysis  - social work  - nephrology consult  - See additional Care Plan goals for specific interventions  Outcome: Completed  Goal: Patient/Family Short Term Goal  Description: Patient's Short Term Goal: walk around room    Interventions:   - physical therapy  - walker  - up to chair for meals  - See additional Care Plan goals for specific interventions  Outcome: Completed     Problem: METABOLIC/FLUID AND ELECTROLYTES - ADULT  Goal: Hemodynamic stability and optimal renal function maintained  Description: INTERVENTIONS:  - Monitor labs and assess for signs and symptoms of volume excess or deficit  - Monitor intake, output and patient weight  - Monitor urine specific gravity, serum osmolarity and serum sodium as indicated or ordered  - Monitor response to interventions for patient's volume status, including labs, urine output, blood pressure (other measures as available)  - Encourage oral intake as appropriate  - Instruct patient on fluid and nutrition restrictions as appropriate  Outcome: Completed     Problem: SKIN/TISSUE INTEGRITY - ADULT  Goal: Skin integrity remains intact  Description: INTERVENTIONS  - Assess and document risk factors for pressure ulcer development  - Assess and document skin integrity  - Monitor for areas of redness and/or skin breakdown  - Initiate interventions, skin care algorithm/standards of care as needed  Outcome: Completed

## 2023-02-20 NOTE — PLAN OF CARE
Problem: Patient Centered Care  Goal: Patient preferences are identified and integrated in the patient's plan of care  Description: Interventions:  - What would you like us to know as we care for you?   - Provide timely, complete, and accurate information to patient/family  - Incorporate patient and family knowledge, values, beliefs, and cultural backgrounds into the planning and delivery of care  - Encourage patient/family to participate in care and decision-making at the level they choose  - Honor patient and family perspectives and choices  Outcome: Progressing     Problem: Diabetes/Glucose Control  Goal: Glucose maintained within prescribed range  Description: INTERVENTIONS:  - Monitor Blood Glucose as ordered  - Assess for signs and symptoms of hyperglycemia and hypoglycemia  - Administer ordered medications to maintain glucose within target range  - Assess barriers to adequate nutritional intake and initiate nutrition consult as needed  - Instruct patient on self management of diabetes  Outcome: Progressing     Problem: Patient/Family Goals  Goal: Patient/Family Long Term Goal  Description: Patient's Long Term Goal:     Interventions:  -   - See additional Care Plan goals for specific interventions  Outcome: Progressing  Goal: Patient/Family Short Term Goal  Description: Patient's Short Term Goal:     Interventions:   -   - See additional Care Plan goals for specific interventions  Outcome: Progressing     Problem: METABOLIC/FLUID AND ELECTROLYTES - ADULT  Goal: Hemodynamic stability and optimal renal function maintained  Description: INTERVENTIONS:  - Monitor labs and assess for signs and symptoms of volume excess or deficit  - Monitor intake, output and patient weight  - Monitor urine specific gravity, serum osmolarity and serum sodium as indicated or ordered  - Monitor response to interventions for patient's volume status, including labs, urine output, blood pressure (other measures as available)  - Encourage oral intake as appropriate  - Instruct patient on fluid and nutrition restrictions as appropriate  Outcome: Progressing     Problem: SKIN/TISSUE INTEGRITY - ADULT  Goal: Skin integrity remains intact  Description: INTERVENTIONS  - Assess and document risk factors for pressure ulcer development  - Assess and document skin integrity  - Monitor for areas of redness and/or skin breakdown  - Initiate interventions, skin care algorithm/standards of care as needed  Outcome: Progressing     Ollie's elevated BP treated per orders with improvement. He expressed drastic improvement in well-being after his constipation subsided. He is anticipated to have a dialysis treatment early on this morning and be discharged for outpatient dialysis pending clearance.

## 2023-02-20 NOTE — CM/SW NOTE
02/20/23 1200   Discharge disposition   Expected discharge disposition Home-Health   Post Acute Care Provider [facility]idential   Discharge transportation Private car     Wife aware of 1st dialysis session this Wednesday afternoon at [address] notified via phone of dc today. Recent dialysis flow sheets imported to [facility].      / to remain available for support and/or discharge planning.

## 2023-02-20 NOTE — CM/SW NOTE
Per patient's wishes, [facility] referrals sent in [address]. Will need choice. DC today after dialysis if clearance obtained. / to remain available for support and/or discharge planning.      Catalina MEYERA BSN RN [address]  RN Case Manager  [phone]

## 2023-02-22 ENCOUNTER — TELEPHONE (OUTPATIENT)
Dept: INTERNAL MEDICINE CLINIC | Facility: CLINIC | Age: 78
End: 2023-02-22

## 2023-02-22 ENCOUNTER — PATIENT OUTREACH (OUTPATIENT)
Dept: CASE MANAGEMENT | Age: 78
End: 2023-02-22

## 2023-02-22 DIAGNOSIS — D63.1 ANEMIA OF CHRONIC RENAL FAILURE, STAGE 4 (SEVERE) (HCC): ICD-10-CM

## 2023-02-22 DIAGNOSIS — Z79.4 TYPE 2 DIABETES MELLITUS WITH DIABETIC NEPHROPATHY, WITH LONG-TERM CURRENT USE OF INSULIN (HCC): ICD-10-CM

## 2023-02-22 DIAGNOSIS — E11.21 TYPE 2 DIABETES MELLITUS WITH DIABETIC NEPHROPATHY, WITH LONG-TERM CURRENT USE OF INSULIN (HCC): Primary | ICD-10-CM

## 2023-02-22 DIAGNOSIS — I50.32 CHRONIC DIASTOLIC CONGESTIVE HEART FAILURE (HCC): ICD-10-CM

## 2023-02-22 DIAGNOSIS — Z79.4 TYPE 2 DIABETES MELLITUS WITH DIABETIC NEPHROPATHY, WITH LONG-TERM CURRENT USE OF INSULIN (HCC): Primary | ICD-10-CM

## 2023-02-22 DIAGNOSIS — G63 NEUROPATHY ASSOCIATED WITH MGUS (HCC): ICD-10-CM

## 2023-02-22 DIAGNOSIS — E11.21 TYPE 2 DIABETES MELLITUS WITH DIABETIC NEPHROPATHY, WITH LONG-TERM CURRENT USE OF INSULIN (HCC): ICD-10-CM

## 2023-02-22 DIAGNOSIS — M10.9 GOUT, UNSPECIFIED CAUSE, UNSPECIFIED CHRONICITY, UNSPECIFIED SITE: ICD-10-CM

## 2023-02-22 DIAGNOSIS — N18.4 ANEMIA OF CHRONIC RENAL FAILURE, STAGE 4 (SEVERE) (HCC): ICD-10-CM

## 2023-02-22 DIAGNOSIS — G47.33 OSA (OBSTRUCTIVE SLEEP APNEA): ICD-10-CM

## 2023-02-22 DIAGNOSIS — Z02.9 ENCOUNTERS FOR UNSPECIFIED ADMINISTRATIVE PURPOSE: ICD-10-CM

## 2023-02-22 DIAGNOSIS — D47.2 NEUROPATHY ASSOCIATED WITH MGUS (HCC): ICD-10-CM

## 2023-02-22 DIAGNOSIS — N17.9 ACUTE RENAL FAILURE, UNSPECIFIED ACUTE RENAL FAILURE TYPE (HCC): ICD-10-CM

## 2023-02-22 DIAGNOSIS — E11.21 DIABETIC NEPHROPATHY ASSOCIATED WITH TYPE 2 DIABETES MELLITUS (HCC): ICD-10-CM

## 2023-02-22 PROCEDURE — 1111F DSCHRG MED/CURRENT MED MERGE: CPT

## 2023-02-22 NOTE — TELEPHONE ENCOUNTER
Patient with history of DM II, Acute renal failure, with initiation of dialysis    Patient would like a referral to a dietician for education on diabetic/CKD diets. Message sent to MD's office. Triage: Please ask Dr. Alfonso Nissen for an order for the above, then notify the patient with any further instructions. Thank you!

## 2023-02-22 NOTE — TELEPHONE ENCOUNTER
Referral provided, please notify patient:    Please call the Central Scheduling Department at [phone] to schedule your appointment at the [facility].

## 2023-02-22 NOTE — TELEPHONE ENCOUNTER
Called patient and relayed DR. RICHEY message - number for Diabetic Education given [phone]- verbalized understanding

## 2023-03-01 NOTE — PATIENT INSTRUCTIONS
You were seen in clinic today for posthospitalization follow-up. Today, we focused on your recent hospitalization and the initiation of dialysis. Continue following up with [facility] [employer] for Monday/Wednesday/Friday dialysis sessions  -Follow-up with Dr. Araceli Berumen of nephrology clinic    Muscle spasms may be related to dehydration or electrolyte shifts from dialysis  - Lets continue to monitor this for now, should improve over time  - Maintain good water intake to avoid dehydration  - You may benefit from the use of a muscle relaxer medication, cyclobenzaprine 5 mg up to 3 times a day as needed. Be careful as this can cause drowsiness, sleepiness  -If still no improvement in the next few weeks, we can consider alternative medications. For constipation:   - Aggressive bowel regimen:    1. Conservative measures include staying well-hydrated, eating high-fiber foods including fruits/vegetables. 2.  Addition of a fiber supplement/stool softener. We recommend over-the-counter MiraLAX or Metamucil 1 dose in the morning  3. If no improvement, add Senokot 1 tablet in the morning  4. If still no improvement, you may need to double the dose of MiraLAX/Metamucil. One 1 tablet in the morning for 1 dose in the  5. If still no improvement, Take 2 tablets in the morning of Senokot    Continue checking your blood pressures at home    Continue checking your blood sugars at home    We have also arranged for a shower chair to be delivered to your home. This will be through [facility]. Notify us if there is difficulty with obtaining the shower chair. We will request the most recent set of blood work from your dialysis center.     Return to clinic in 3 months for Medicare annual physical examination
1

## 2023-03-02 ENCOUNTER — OFFICE VISIT (OUTPATIENT)
Dept: INTERNAL MEDICINE CLINIC | Facility: CLINIC | Age: 78
End: 2023-03-02

## 2023-03-02 VITALS
WEIGHT: 158.38 LBS | BODY MASS INDEX: 26.39 KG/M2 | SYSTOLIC BLOOD PRESSURE: 130 MMHG | HEIGHT: 65 IN | OXYGEN SATURATION: 98 % | HEART RATE: 71 BPM | DIASTOLIC BLOOD PRESSURE: 62 MMHG

## 2023-03-02 DIAGNOSIS — Z79.4 INSULIN DEPENDENT TYPE 2 DIABETES MELLITUS (HCC): ICD-10-CM

## 2023-03-02 DIAGNOSIS — E11.21 TYPE 2 DIABETES MELLITUS WITH DIABETIC NEPHROPATHY, WITH LONG-TERM CURRENT USE OF INSULIN (HCC): ICD-10-CM

## 2023-03-02 DIAGNOSIS — Z79.4 TYPE 2 DIABETES MELLITUS WITH DIABETIC NEPHROPATHY, WITH LONG-TERM CURRENT USE OF INSULIN (HCC): ICD-10-CM

## 2023-03-02 DIAGNOSIS — I10 ESSENTIAL HYPERTENSION: ICD-10-CM

## 2023-03-02 DIAGNOSIS — N18.5 ANEMIA OF CHRONIC RENAL FAILURE, STAGE 5 (HCC): Primary | ICD-10-CM

## 2023-03-02 DIAGNOSIS — R29.6 FREQUENT FALLS: ICD-10-CM

## 2023-03-02 DIAGNOSIS — E78.2 MIXED HYPERLIPIDEMIA: ICD-10-CM

## 2023-03-02 DIAGNOSIS — D63.1 ANEMIA OF CHRONIC RENAL FAILURE, STAGE 5 (HCC): Primary | ICD-10-CM

## 2023-03-02 DIAGNOSIS — M10.9 GOUT, UNSPECIFIED CAUSE, UNSPECIFIED CHRONICITY, UNSPECIFIED SITE: ICD-10-CM

## 2023-03-02 DIAGNOSIS — E11.21 DIABETIC NEPHROPATHY ASSOCIATED WITH TYPE 2 DIABETES MELLITUS (HCC): ICD-10-CM

## 2023-03-02 DIAGNOSIS — Z79.4 TYPE 2 DIABETES MELLITUS WITH BOTH EYES AFFECTED BY PROLIFERATIVE RETINOPATHY AND TRACTION RETINAL DETACHMENTS NOT INVOLVING MACULAE, WITH LONG-TERM CURRENT USE OF INSULIN (HCC): ICD-10-CM

## 2023-03-02 DIAGNOSIS — E11.3533 TYPE 2 DIABETES MELLITUS WITH BOTH EYES AFFECTED BY PROLIFERATIVE RETINOPATHY AND TRACTION RETINAL DETACHMENTS NOT INVOLVING MACULAE, WITH LONG-TERM CURRENT USE OF INSULIN (HCC): ICD-10-CM

## 2023-03-02 DIAGNOSIS — G95.9 CERVICAL MYELOPATHY (HCC): ICD-10-CM

## 2023-03-02 DIAGNOSIS — E11.9 INSULIN DEPENDENT TYPE 2 DIABETES MELLITUS (HCC): ICD-10-CM

## 2023-03-02 PROCEDURE — 99495 TRANSJ CARE MGMT MOD F2F 14D: CPT | Performed by: INTERNAL MEDICINE

## 2023-03-02 PROCEDURE — 1111F DSCHRG MED/CURRENT MED MERGE: CPT | Performed by: INTERNAL MEDICINE

## 2023-03-02 PROCEDURE — 3008F BODY MASS INDEX DOCD: CPT | Performed by: INTERNAL MEDICINE

## 2023-03-02 PROCEDURE — 3075F SYST BP GE 130 - 139MM HG: CPT | Performed by: INTERNAL MEDICINE

## 2023-03-02 PROCEDURE — 3078F DIAST BP <80 MM HG: CPT | Performed by: INTERNAL MEDICINE

## 2023-03-02 RX ORDER — PERPHENAZINE 16 MG/1
TABLET, FILM COATED ORAL
Qty: 300 EACH | Refills: 3 | Status: SHIPPED | OUTPATIENT
Start: 2023-03-02

## 2023-03-07 ENCOUNTER — TELEPHONE (OUTPATIENT)
Dept: INTERNAL MEDICINE CLINIC | Facility: CLINIC | Age: 78
End: 2023-03-07

## 2023-03-07 NOTE — TELEPHONE ENCOUNTER
As FYI to DR. RICHEY -called Chuck Robledo from [facility] and gave verbal approval for plan of care per protocol - left on confidential VM

## 2023-03-07 NOTE — TELEPHONE ENCOUNTER
Meredith Shankar the OT from [place] [address] is calling to let the doctor know that the OT evaluation was moved from last week to this week per patients request.    Please call with verbal order at

## 2023-03-08 ENCOUNTER — TELEPHONE (OUTPATIENT)
Dept: INTERNAL MEDICINE CLINIC | Facility: CLINIC | Age: 78
End: 2023-03-08

## 2023-03-08 DIAGNOSIS — R50.81 FEVER IN OTHER DISEASES: ICD-10-CM

## 2023-03-08 DIAGNOSIS — R09.89 RESPIRATORY CRACKLES AT RIGHT LUNG BASE: ICD-10-CM

## 2023-03-08 DIAGNOSIS — R05.9 COUGH: ICD-10-CM

## 2023-03-08 NOTE — TELEPHONE ENCOUNTER
Patient calling for refill Symbicort 160-4.5  2 puffs twice a day. Patient is new to Dr. Candi Tristan, he has not had filled by Dr. Candi Tristan.      [facility]

## 2023-03-09 RX ORDER — ALBUTEROL SULFATE 90 UG/1
2 AEROSOL, METERED RESPIRATORY (INHALATION) EVERY 4 HOURS PRN
Qty: 3 EACH | Refills: 3 | Status: SHIPPED | OUTPATIENT
Start: 2023-03-09

## 2023-03-09 RX ORDER — BUDESONIDE AND FORMOTEROL FUMARATE DIHYDRATE 160; 4.5 UG/1; UG/1
2 AEROSOL RESPIRATORY (INHALATION) 2 TIMES DAILY
Qty: 3 EACH | Refills: 3 | Status: SHIPPED | OUTPATIENT
Start: 2023-03-09

## 2023-03-09 NOTE — TELEPHONE ENCOUNTER
Both inhalers would be new RXs for [facility] as pt established care 8/2022.     Dr. Gifford Hamman please review/advise on RXS

## 2023-03-15 ENCOUNTER — APPOINTMENT (OUTPATIENT)
Dept: HEMATOLOGY/ONCOLOGY | Facility: HOSPITAL | Age: 78
End: 2023-03-15
Attending: INTERNAL MEDICINE
Payer: MEDICARE

## 2023-03-29 ENCOUNTER — TELEPHONE (OUTPATIENT)
Dept: INTERNAL MEDICINE CLINIC | Facility: CLINIC | Age: 78
End: 2023-03-29

## 2023-03-29 NOTE — TELEPHONE ENCOUNTER
Please call patient with status on shower chair discussed with Dr Carson Navarrete on 2/20/23  Tasked to nursing

## 2023-03-29 NOTE — TELEPHONE ENCOUNTER
Referral was placed 3/2/2023 for [facility], to nursing are able to follow-up on this?   I never received any correspondence from this request

## 2023-03-29 NOTE — TELEPHONE ENCOUNTER
Called [facility] - they did not have an order - order and face sheet faxed to [facility] at [phone] recieved  Called patient and relayed this message , also phone number for [facility] given to patient

## 2023-04-11 ENCOUNTER — TELEPHONE (OUTPATIENT)
Dept: NEPHROLOGY | Facility: CLINIC | Age: 78
End: 2023-04-11

## 2023-04-11 ENCOUNTER — TELEPHONE (OUTPATIENT)
Dept: INTERNAL MEDICINE CLINIC | Facility: CLINIC | Age: 78
End: 2023-04-11

## 2023-04-11 RX ORDER — AZITHROMYCIN 250 MG/1
TABLET, FILM COATED ORAL
Qty: 6 TABLET | Refills: 0 | Status: SHIPPED | OUTPATIENT
Start: 2023-04-11 | End: 2023-04-16

## 2023-04-11 NOTE — TELEPHONE ENCOUNTER
IV in left AC shows to be infiltrated.  Thre is a noticeable area of fluid collection on the inner part of the elbow.  Looking back through the MAR, the last thing to go through the IV was IV Zosyn at approximately 1500.  IV was discontinued.   Pharmacy was called to see if there is any special treatment for the infiltration.  No medications needed to treat the infiltration, just use a cold compress to the affected area.  Will continue to monitor and update as needed.   Patient requesting referral to have procedure done. [facility] sx center is looking for information, [facility] (ph# [phone]). For additional questions please call. Thank you.

## 2023-04-11 NOTE — TELEPHONE ENCOUNTER
To Dr Yanira Rao -- Please Advise     Spoke with pt, states his allergies started to flare up last week, reports symptoms below, states his sinuses feel swollen. Pt is using Flonase BID, cetirizine 5 mg every other day. States he is taking all the medications on his list. Asked pt about inhalers, he said \"youre repeating yourself, I said Im taking everything on the list.\" Pt denies chest pain, denies SOB while at rest. Pt able to complete sentences during duration of call. Home Covid Test   [x] No - Pt does not have home test    OTC Meds: None    Respiratory infection triage:  Fever:  [x] No fever  [] Fever>100.4  [] Chills  [] Night Sweats  [] Body Aches    Cough:  [] Tight cough  [] Cough with exertion  [x] Dry cough  [] Sputum production    Breathing:  [x] Shortness of breath with exertion - Pt reports normal for pt   [] Shortness of breath at rest  [] Heavy breathing  [] Wheezing  [] Pain with deep breathing  [x] Using inhaler    GI Symptoms:  [] Nausea   [] Vomiting   [] Diarrhea   [] Poor appetite     Other Symptoms:  [x] Sore throat  [] Difficulty swallowing   [] Nasal drainage/congestion  [x] Sinus congestion/pressure  [] Headache  [] Fatigue   [] Weakness  [x] Ear pain -bilateral, ringing  [] Conjunctivitis  [] Loss of sense of smell   [] Loss of sense of taste    Positive COVID Exposure (<6 feet, >15 mins. no mask)  [] Yes  [x] No    Vaccinated [x] Yes  [] No  Booster [x] Yes  [] No    Notified patient that we will route this message to the doctor and see what their recommendations would be. In the meantime, if anything worsens, they were advised to call back or seek emergent evaluation.

## 2023-04-11 NOTE — TELEPHONE ENCOUNTER
We can do a short course of azithromycin, 2 tablets today, 1 tablet the next 4 days. If still no improvement needs to be evaluated in clinic.

## 2023-04-11 NOTE — TELEPHONE ENCOUNTER
Patient is calling for advise. Is new dialysis patient. Seasonal allergies are flaring up, sore throat, itchy eyes and nose. Some sinus congestion. Asking what medications he may take to help with symptoms.     [address] call back number [phone]

## 2023-04-11 NOTE — TELEPHONE ENCOUNTER
Yes he needs to contact the clinic manager [facility] for this.  [phone] is the number,.  I will ask her to call him, but if he calls back this is the number, thanks

## 2023-04-11 NOTE — TELEPHONE ENCOUNTER
Needs triage [place] ambulatory encounter  INTERNAL MEDICINE OFFICE VISIT    CHIEF COMPLAINT:    Chief Complaint   Patient presents with   • Office Visit       SUBJECTIVE:  Amie Burch is a 62 year old female who presented requesting evaluation for 6 month follow up of DM, HTN and HLD. Stopped Crestor in March due to muscle cramping which has improved. Recalls previously similar experience on Lipitor. Also has carotid stenosis followed by cardiology. Remains on baby aspirin. Very active  and walks about 2 miles most mornings. Recent labs reviewed and discussed.      Review of systems:   Constitutional:  No fevers or chills.  No fatigue.   Respiratory:  No shortness of breath.  No cough.  No wheezing.  Cardiovascular:  No chest pain.  No palpitations.  No edema.  No syncope.  Gastrointestinal:  No abdominal pain.  No nausea.  No vomiting.  No diarrhea. No constipation.  No blood in stool.     PAST HISTORIES:  I have reviewed the patient's medications and allergies, past medical, surgical, social and family history, updating these as appropriate.  See Histories section of the electronic medical record for a display of this information.    OBJECTIVE:  Physical Exam:    Vital Signs:    Vitals:    07/13/21 1313   BP: 130/72   BP Location: RU - Right upper extremity   Patient Position: Sitting   Cuff Size: Regular   Pulse: 72   SpO2: 96%   Weight: 78 kg   Height: 5' 5\" (1.651 m)     HEENT:  Normocephalic, atraumatic.  Conjunctivae pink.    Respiratory:  Clear to auscultation bilaterally.  Normal inspiratory effort.   Cardiovascular:  Regular rate and rhythm.    Extremities:  No pallor.  No cyanosis. No edema.   Neurologic:  Awake, alert, cooperative.   Skin:   No visible rash.    LAB RESULTS:  Pertinent labs reviewed.  Component      Latest Ref Rng & Units 6/30/2021   Fasting Status      Hours 12   Sodium      135 - 145 mmol/L 140   Potassium      3.4 - 5.1 mmol/L 4.4   Chloride      98 - 107 mmol/L 105   CO2      21 - 32  mmol/L 29   ANION GAP      10 - 20 mmol/L 10   Glucose      65 - 99 mg/dL 117 (H)   BUN      6 - 20 mg/dL 10   Creatinine      0.51 - 0.95 mg/dL 0.90   Glomerular Filtration Rate      >90 mL/min/1.73m2 70 (L)   BUN/CREATININE RATIO      7 - 25 11   CALCIUM      8.4 - 10.2 mg/dL 8.9   URINE MICRO      mg/dL 1.12   CREATININE, URINE (TOTAL)      mg/dL 122.00   MICROALBUMIN/CREAT      <30.0 mg/g 9.2   ALT/SGPT      <64 Units/L 41   AST/SGOT      <=37 Units/L 21   GLYCOHEMOGLOBIN A1C      4.5 - 5.6 % 6.7 (H)       Assessment:   1. Type 2 diabetes mellitus without complication, without long-term current use of insulin (CMS/HCC)    2. Benign essential HTN    3. Mixed hyperlipidemia    4. Muscle cramping          PLAN:  1. Type 2 diabetes mellitus without complication, without long-term current use of insulin (CMS/HCC)  Diet controlled.   - LIPID PANEL WITHOUT REFLEX; Future  - CREATINE KINASE; Future  - GLYCOHEMOGLOBIN; Future  - BASIC METABOLIC PANEL; Future  - SGOT; Future  - SGPT; Future    2. Benign essential HTN  Stable, continue current management.   - BASIC METABOLIC PANEL; Future    3. Mixed hyperlipidemia  Currently off statin therapy. Of note, patient was concomitantly on Lamisil for treatment of nail fungus at time of Crestor discontinuation. Now off Lamisil treatment. Recommend repeat labs including lipid, CK and LFTs prior to next appt. Will consider trial of alternate statin at that time. Consider pravastatin.   - CREATINE KINASE; Future  - BASIC METABOLIC PANEL; Future  - SGOT; Future  - SGPT; Future    4. Muscle cramping  Improved, now off statin and Lamisil.     5. Asymptomatic bilateral carotid artery stenosis  Followed by cardiology. Continue aspirin, consider cardiology input regarding ongoing statin treatment options.     Return for DM visit in 6 mo with labs prior.      Instructions provided as documented in the after visit summary.    The patient indicated understanding of the diagnosis and agreed  with the plan of care.

## 2023-04-11 NOTE — TELEPHONE ENCOUNTER
Dr. Johnny Butterfield, per patient, he's getting dialysis catheter removed and AV fistula placed. He needs referrals/his information sent to [facility]. I spoke with the surgery center and they said this information should be coming from the dialysis center. Can you follow up with the dialysis center, or do we need to do something in our office? tia

## 2023-05-02 ENCOUNTER — TELEPHONE (OUTPATIENT)
Dept: ENDOCRINOLOGY CLINIC | Facility: CLINIC | Age: 78
End: 2023-05-02

## 2023-05-08 ENCOUNTER — MED REC SCAN ONLY (OUTPATIENT)
Dept: ENDOCRINOLOGY CLINIC | Facility: CLINIC | Age: 78
End: 2023-05-08

## 2023-05-08 ENCOUNTER — TELEPHONE (OUTPATIENT)
Dept: ENDOCRINOLOGY CLINIC | Facility: CLINIC | Age: 78
End: 2023-05-08

## 2023-05-08 ENCOUNTER — OFFICE VISIT (OUTPATIENT)
Dept: ENDOCRINOLOGY CLINIC | Facility: CLINIC | Age: 78
End: 2023-05-08

## 2023-05-08 VITALS
WEIGHT: 167 LBS | HEART RATE: 69 BPM | SYSTOLIC BLOOD PRESSURE: 127 MMHG | BODY MASS INDEX: 27.82 KG/M2 | DIASTOLIC BLOOD PRESSURE: 67 MMHG | HEIGHT: 65 IN

## 2023-05-08 DIAGNOSIS — E11.22 TYPE 2 DIABETES MELLITUS WITH ESRD (END-STAGE RENAL DISEASE) (HCC): Primary | ICD-10-CM

## 2023-05-08 DIAGNOSIS — E78.5 DYSLIPIDEMIA: ICD-10-CM

## 2023-05-08 DIAGNOSIS — N18.6 TYPE 2 DIABETES MELLITUS WITH ESRD (END-STAGE RENAL DISEASE) (HCC): Primary | ICD-10-CM

## 2023-05-08 LAB
CARTRIDGE LOT#: ABNORMAL NUMERIC
GLUCOSE BLOOD: 210
HEMOGLOBIN A1C: 6.5 % (ref 4.3–5.6)
TEST STRIP LOT #: NORMAL NUMERIC

## 2023-05-08 PROCEDURE — 82947 ASSAY GLUCOSE BLOOD QUANT: CPT | Performed by: INTERNAL MEDICINE

## 2023-05-08 PROCEDURE — 3008F BODY MASS INDEX DOCD: CPT | Performed by: INTERNAL MEDICINE

## 2023-05-08 PROCEDURE — 3074F SYST BP LT 130 MM HG: CPT | Performed by: INTERNAL MEDICINE

## 2023-05-08 PROCEDURE — 1159F MED LIST DOCD IN RCRD: CPT | Performed by: INTERNAL MEDICINE

## 2023-05-08 PROCEDURE — 1160F RVW MEDS BY RX/DR IN RCRD: CPT | Performed by: INTERNAL MEDICINE

## 2023-05-08 PROCEDURE — 99213 OFFICE O/P EST LOW 20 MIN: CPT | Performed by: INTERNAL MEDICINE

## 2023-05-08 PROCEDURE — 3078F DIAST BP <80 MM HG: CPT | Performed by: INTERNAL MEDICINE

## 2023-05-08 PROCEDURE — 83036 HEMOGLOBIN GLYCOSYLATED A1C: CPT | Performed by: INTERNAL MEDICINE

## 2023-05-08 RX ORDER — PEN NEEDLE, DIABETIC 30 GX3/16"
1 NEEDLE, DISPOSABLE MISCELLANEOUS DAILY
Qty: 100 EACH | Refills: 0 | Status: SHIPPED | OUTPATIENT
Start: 2023-05-08

## 2023-05-08 RX ORDER — INSULIN GLARGINE 100 [IU]/ML
INJECTION, SOLUTION SUBCUTANEOUS NIGHTLY
Qty: 16.2 ML | Refills: 0 | Status: SHIPPED | OUTPATIENT
Start: 2023-05-08 | End: 2023-08-06

## 2023-05-08 NOTE — TELEPHONE ENCOUNTER
Faxed to [facility], fax confirmation success.  Placed in brown bin for future reference

## 2023-05-08 NOTE — TELEPHONE ENCOUNTER
Spoke to patient regarding CGM. Patient's phone is not compatible with CGM [employer] and Mele Daniels is not covered by Medicare. Patient will have to use Dexcom G7 sensors and . Order form filled out for [facility] filled out for patient. Placed in folder. Information given to patient and spouse to create an account with [facility] for Dexcom 7 CGM.

## 2023-05-11 ENCOUNTER — TELEPHONE (OUTPATIENT)
Dept: ENDOCRINOLOGY CLINIC | Facility: CLINIC | Age: 78
End: 2023-05-11

## 2023-05-11 DIAGNOSIS — N18.6 TYPE 2 DIABETES MELLITUS WITH ESRD (END-STAGE RENAL DISEASE) (HCC): ICD-10-CM

## 2023-05-11 DIAGNOSIS — E11.22 TYPE 2 DIABETES MELLITUS WITH ESRD (END-STAGE RENAL DISEASE) (HCC): ICD-10-CM

## 2023-05-11 RX ORDER — INSULIN GLARGINE 100 [IU]/ML
INJECTION, SOLUTION SUBCUTANEOUS NIGHTLY
Qty: 18 ML | Refills: 0 | Status: SHIPPED | OUTPATIENT
Start: 2023-05-11

## 2023-05-11 NOTE — TELEPHONE ENCOUNTER
Per LOV dtd 5/8/23: Lantus  18  Nightly, on Dialysis days take 16 nightly     RX re-sent stating above directions

## 2023-05-18 ENCOUNTER — TELEPHONE (OUTPATIENT)
Facility: CLINIC | Age: 78
End: 2023-05-18

## 2023-05-18 ENCOUNTER — TELEPHONE (OUTPATIENT)
Dept: INTERNAL MEDICINE CLINIC | Facility: CLINIC | Age: 78
End: 2023-05-18

## 2023-05-18 DIAGNOSIS — E78.2 MIXED HYPERLIPIDEMIA: ICD-10-CM

## 2023-05-18 NOTE — TELEPHONE ENCOUNTER
Wife states pt is suppose to have blood work orders put in and EKG order please follow up it is urgent

## 2023-05-18 NOTE — TELEPHONE ENCOUNTER
Dr Consuelo Rebollar pt wife is requesting order for EKG, for surgery(catheter removal and AV fistula placement. thanks.

## 2023-05-18 NOTE — TELEPHONE ENCOUNTER
Patient requesting refill for:  Atorvastatin    Patient is out of medication   Patient said he had requested at pharmacy that they contact us    [facility]    Tasked to [employer]

## 2023-05-19 ENCOUNTER — TELEPHONE (OUTPATIENT)
Dept: INTERNAL MEDICINE CLINIC | Facility: CLINIC | Age: 78
End: 2023-05-19

## 2023-05-19 DIAGNOSIS — Z01.818 PRE-OP TESTING: Primary | ICD-10-CM

## 2023-05-19 RX ORDER — ATORVASTATIN CALCIUM 40 MG/1
40 TABLET, FILM COATED ORAL NIGHTLY
Qty: 90 TABLET | Refills: 0 | Status: SHIPPED | OUTPATIENT
Start: 2023-05-19

## 2023-05-19 NOTE — TELEPHONE ENCOUNTER
Patient is calling to request an order for an EKG. Patient is scheduled for surgery 5/25/2023. Patient has been informed an EKG is needed.       # [phone]

## 2023-05-19 NOTE — TELEPHONE ENCOUNTER
Dr Theodore Garber office called and states the patient needs an EKG for a surgery he is having on 5/25/2023 catheter removal.    Per Dr Theodore Garber office the patient keeps calling asking for Dr Theodore Garber to put the orders in. Patient was advised to call his PCP to have the orders put in. Please enter orders and call and advise patient that orders are in the system.

## 2023-05-19 NOTE — TELEPHONE ENCOUNTER
Noted no preop needed. Please notify patient that EKG order placed. Should be able to walk in but can call and schedule an appointment.     He should also coordinate and ideally have hemodialysis the day before surgery if not already arranged with Dr. Richi Landin

## 2023-05-19 NOTE — TELEPHONE ENCOUNTER
To Dr Hernandez - Please Advise     Received fax from [facility]. Requesting EKG. CBC and BMP completed through dialysis center and confirmed they do not need new orders. EKG pended orders, sign if ok. Per Reta Ca, at Dr. Samson Shone office, pt just needs testing, Pre-op clearance/OV not required. Requesting EKG to be faxed by 5/23. Fax # [phone]    Pt notified message will be routed to MD to review Monday morning. Advised to be ready to complete EKG Monday.

## 2023-05-19 NOTE — TELEPHONE ENCOUNTER
Spoke with pt, states he is scheduled for surgery stent removal and placement with Dr. Sarmad Liu, outpatient sx at [facility]. VURWB:[phone]   Fax: [phone]    Pt gave pre op form to Dr Maurice Silva, who ordered CMP and BMP. Requesting Dr Yoon Veliz to order EKG. Pt has a copy of pre of form. Requested pt to fax to our office if able, he agrees and will fax now to ATTN: Dr Yoon Veliz. Spoke with Елена from Dr. Addis Holt office, verifies pts surgery is on 5/25,  AV fistula for Dialysis. She is sending form over to our office. Requesting EKG. States a pre-op clearance is not needed. Requesting EKG to be faxed by 5/23.      Awaiting Fax

## 2023-05-20 ENCOUNTER — EKG ENCOUNTER (OUTPATIENT)
Dept: LAB | Facility: HOSPITAL | Age: 78
End: 2023-05-20
Attending: INTERNAL MEDICINE
Payer: MEDICARE

## 2023-05-20 DIAGNOSIS — Z01.818 PRE-OP TESTING: ICD-10-CM

## 2023-05-20 LAB
ATRIAL RATE: 73 BPM
P AXIS: 31 DEGREES
P-R INTERVAL: 158 MS
Q-T INTERVAL: 412 MS
QRS DURATION: 78 MS
QTC CALCULATION (BEZET): 453 MS
R AXIS: 7 DEGREES
T AXIS: 86 DEGREES
VENTRICULAR RATE: 73 BPM

## 2023-05-20 PROCEDURE — 93010 ELECTROCARDIOGRAM REPORT: CPT | Performed by: INTERNAL MEDICINE

## 2023-05-20 PROCEDURE — 93005 ELECTROCARDIOGRAM TRACING: CPT

## 2023-05-22 ENCOUNTER — TELEPHONE (OUTPATIENT)
Dept: INTERNAL MEDICINE CLINIC | Facility: CLINIC | Age: 78
End: 2023-05-22

## 2023-05-22 NOTE — TELEPHONE ENCOUNTER
Spoke to patients spouse (okay per hippa) and she stated that surgeons office has already received the blood work and that only thing they need now is the EKG, EKG was faxed to number provided below, currently awaiting fax confirmation.

## 2023-06-11 PROBLEM — N18.4 CHRONIC KIDNEY DISEASE, STAGE IV (SEVERE) (HCC): Status: RESOLVED | Noted: 2019-12-04 | Resolved: 2023-06-11

## 2023-06-11 PROBLEM — N40.0 BENIGN PROSTATIC HYPERPLASIA, UNSPECIFIED WHETHER LOWER URINARY TRACT SYMPTOMS PRESENT: Status: RESOLVED | Noted: 2021-04-21 | Resolved: 2023-06-11

## 2023-06-11 PROBLEM — G31.9 BRAIN ATROPHY: Status: RESOLVED | Noted: 2020-02-20 | Resolved: 2023-06-11

## 2023-06-11 PROBLEM — R35.1 NOCTURIA: Status: RESOLVED | Noted: 2021-11-14 | Resolved: 2023-06-11

## 2023-06-11 PROBLEM — I70.8 AORTO-ILIAC ATHEROSCLEROSIS: Status: RESOLVED | Noted: 2022-02-10 | Resolved: 2023-06-11

## 2023-06-11 PROBLEM — I70.0 AORTO-ILIAC ATHEROSCLEROSIS: Status: RESOLVED | Noted: 2022-02-10 | Resolved: 2023-06-11

## 2023-06-11 PROBLEM — N17.9 ACUTE RENAL FAILURE (ARF) (HCC): Status: RESOLVED | Noted: 2023-02-16 | Resolved: 2023-06-11

## 2023-06-11 PROBLEM — I70.0 AORTO-ILIAC ATHEROSCLEROSIS  (HCC): Status: RESOLVED | Noted: 2022-02-10 | Resolved: 2023-06-11

## 2023-06-11 PROBLEM — R35.0 URINARY FREQUENCY: Status: RESOLVED | Noted: 2021-11-14 | Resolved: 2023-06-11

## 2023-06-11 PROBLEM — I70.8 AORTO-ILIAC ATHEROSCLEROSIS  (HCC): Status: RESOLVED | Noted: 2022-02-10 | Resolved: 2023-06-11

## 2023-06-11 PROBLEM — I70.8 AORTO-ILIAC ATHEROSCLEROSIS (HCC): Status: RESOLVED | Noted: 2022-02-10 | Resolved: 2023-06-11

## 2023-06-11 PROBLEM — G31.9 BRAIN ATROPHY (HCC): Status: RESOLVED | Noted: 2020-02-20 | Resolved: 2023-06-11

## 2023-06-11 PROBLEM — N17.9 ACUTE RENAL FAILURE (ARF): Status: RESOLVED | Noted: 2023-02-16 | Resolved: 2023-06-11

## 2023-06-11 PROBLEM — K60.2 ANAL FISSURE: Status: RESOLVED | Noted: 2021-06-28 | Resolved: 2023-06-11

## 2023-06-11 PROBLEM — I70.0 AORTO-ILIAC ATHEROSCLEROSIS (HCC): Status: RESOLVED | Noted: 2022-02-10 | Resolved: 2023-06-11

## 2023-06-11 NOTE — PATIENT INSTRUCTIONS
- You were seen in clinic for regular annual check-up. We have ordered labs for you and we will call you with the results. Please obtain the bloodwork fasting for 10 hours. OK to drink water the day of your blood draw. We have printed out lab orders so that you may obtain them with your usual hemodialysis labs. -Continue with hemodialysis as directed by Dr. Gisela Kern  -Please continue with the antibiotic eardrops for your ear infection per ENT  -We will refer the shower chair to a different company to facilitate approval of the medical equipment.  -We can go ahead and proceed with a trial off of the tamsulosin/Flomax. It is likely that the dialysis is taking care of the excessive fluid buildup that was causing frequent urination at nighttime. If we have any increase in nighttime urination episodes, notify us as we can consider resuming the medication.  - Lets continue with a good bowel regimen:  1. Conservative measures include staying well-hydrated, eating high-fiber foods including fruits/vegetables. 2.  Addition of a fiber supplement/stool softener. We recommend over-the-counter MiraLAX or Metamucil 1 dose in the morning  3. If no improvement, add Senokot 1 tablet in the morning  4. If still no improvement, you may need to double the dose of MiraLAX/Metamucil. One 1 tablet in the morning for 1 dose in the  5. If still no improvement, Take 2 tablets in the morning of Senokot    -We will follow-up on your blood sugars. They have been overall improved with Dr. Neymar Brock management.   He may be due for diabetic eye examination, and we will follow-up on ophthalmology's recommendations  - Your heart status is stable per Dr. Mirian Randhawa  -Please continue checking your blood pressures at home and notify us if they are increasing   - please continue checking your blood sugars at home and notify us if they are increasing  -Your next colonoscopy will be in 2028 with Dr. Negrita Odell  -Marcial Arellano may be due for Tdap/tetanus shot  - Please continue to eat a varied diet including recommended servings of vegetables, fruits, and low fat dairy. Minimize high saturated fats (such as fast foods) and high sugar intake (such as soda)  - We recommend 150 minutes of moderate intensity exercise (brisk walking, swimming) weekly to maintain your current weight. Targeted weight loss will require more vigorous exercise or more than 150 minutes/week. Return to clinic in 3 months for follow-up    [address]   Tests on this list are recommended by your physician but may not be covered, or covered at this frequency, by your insurer. Please check with your insurance carrier before scheduling to verify coverage.    PREVENTATIVE SERVICES FREQUENCY &  COVERAGE DETAILS LAST COMPLETION DATE   Diabetes Screening    Fasting Blood Sugar / Glucose    One screening every 12 months if never tested or if previously tested but not diagnosed with pre-diabetes   One screening every 6 months if diagnosed with pre-diabetes Lab Results   Component Value Date     (H) 02/20/2023        Cardiovascular Disease Screening    Lipid Panel  Cholesterol  Lipoprotein (HDL)  Triglycerides Covered every 5 years for all Medicare beneficiaries without apparent signs or symptoms of cardiovascular disease Lab Results   Component Value Date    CHOLEST 157 11/23/2022    HDL 36 (L) 11/23/2022    LDL 96 11/23/2022    LDLD 161 (H) 10/26/2021    TRIG 143 11/23/2022         Electrocardiogram (EKG)   Covered if needed at Welcome to Medicare, and non-screening if indicated for medical reasons 05/20/2023      Ultrasound Screening for Abdominal Aortic Aneurysm (AAA) Covered once in a lifetime for one of the following risk factors    Men who are 73-68 years old and have ever smoked    Anyone with a family history -     Colorectal Cancer Screening  Covered for ages 52-80; only need ONE of the following:    Colonoscopy   Covered every 10 years    Covered every 2 years if patient is at high risk or previous colonoscopy was abnormal 01/25/2021    Colorectal Cancer Screening due on 01/25/2028    Flexible Sigmoidoscopy   Covered every 4 years -    Fecal Occult Blood Test Covered annually -   Prostate Cancer Screening    Prostate-Specific Antigen (PSA) Annually Lab Results   Component Value Date    PSA 2.94 10/20/2021     There are no preventive care reminders to display for this patient. Immunizations    Influenza Covered once per flu season  Please get every year 10/21/2022  No recommendations at this time    Pneumococcal Each vaccine (Aauutll14 & Sqcygunld61) covered once after 65 Prevnar 13: 06/15/2017    Teldykxes64: 07/10/2018     No recommendations at this time    Hepatitis B One screening covered for patients with certain risk factors   -  No recommendations at this time    Tetanus Toxoid Not covered by Medicare Part B unless medically necessary (cut with metal); may be covered with your pharmacy prescription benefits -    Tetanus, Diptheria and Pertusis TD and TDaP Not covered by Medicare Part B -  No recommendations at this time    Zoster Not covered by Medicare Part B; may be covered with your pharmacy  prescription benefits -  No recommendations at this time       Diabetes      Hemoglobin A1C Annually; if last result is elevated, may be asked to retest more frequently.     Medicare covers every 3 months Lab Results   Component Value Date     (H) 08/31/2022    A1C 6.5 (A) 05/08/2023       No recommendations at this time    Creat/alb ratio Annually Lab Results   Component Value Date    MICROALBCREA 3,658.5 (H) 08/31/2022       LDL Annually Lab Results   Component Value Date    LDL 96 11/23/2022       Dilated Eye Exam Annually 07/22/2021       Chronic Obstructive Pulmonary Disease (COPD)    Spirometry Annually Spirometry date:

## 2023-06-13 ENCOUNTER — OFFICE VISIT (OUTPATIENT)
Dept: INTERNAL MEDICINE CLINIC | Facility: CLINIC | Age: 78
End: 2023-06-13

## 2023-06-13 ENCOUNTER — TELEPHONE (OUTPATIENT)
Dept: INTERNAL MEDICINE CLINIC | Facility: CLINIC | Age: 78
End: 2023-06-13

## 2023-06-13 VITALS
TEMPERATURE: 98 F | OXYGEN SATURATION: 100 % | HEART RATE: 75 BPM | DIASTOLIC BLOOD PRESSURE: 60 MMHG | BODY MASS INDEX: 29.23 KG/M2 | SYSTOLIC BLOOD PRESSURE: 140 MMHG | HEIGHT: 63.1 IN | WEIGHT: 165 LBS

## 2023-06-13 DIAGNOSIS — Z00.00 ANNUAL PHYSICAL EXAM: Primary | ICD-10-CM

## 2023-06-13 DIAGNOSIS — N18.4 HYPERTENSIVE HEART AND KIDNEY DISEASE WITH CHRONIC DIASTOLIC CONGESTIVE HEART FAILURE AND STAGE 4 CHRONIC KIDNEY DISEASE (HCC): ICD-10-CM

## 2023-06-13 DIAGNOSIS — Z13.89 SCREENING FOR NEPHROPATHY: ICD-10-CM

## 2023-06-13 DIAGNOSIS — I50.32 HYPERTENSIVE HEART AND KIDNEY DISEASE WITH CHRONIC DIASTOLIC CONGESTIVE HEART FAILURE AND STAGE 4 CHRONIC KIDNEY DISEASE (HCC): ICD-10-CM

## 2023-06-13 DIAGNOSIS — I27.20 PULMONARY HTN (HCC): ICD-10-CM

## 2023-06-13 DIAGNOSIS — Z12.5 SCREENING FOR PROSTATE CANCER: ICD-10-CM

## 2023-06-13 DIAGNOSIS — I10 ESSENTIAL HYPERTENSION: ICD-10-CM

## 2023-06-13 DIAGNOSIS — Z00.00 ENCOUNTER FOR ANNUAL HEALTH EXAMINATION: ICD-10-CM

## 2023-06-13 DIAGNOSIS — N18.5 ANEMIA OF CHRONIC RENAL FAILURE, STAGE 5 (HCC): ICD-10-CM

## 2023-06-13 DIAGNOSIS — E55.9 VITAMIN D DEFICIENCY: ICD-10-CM

## 2023-06-13 DIAGNOSIS — D63.1 ANEMIA OF CHRONIC RENAL FAILURE, STAGE 5 (HCC): ICD-10-CM

## 2023-06-13 DIAGNOSIS — M54.12 CERVICAL RADICULOPATHY: ICD-10-CM

## 2023-06-13 DIAGNOSIS — I70.213 ATHEROSCLEROSIS OF NATIVE ARTERIES OF EXTREMITIES WITH INTERMITTENT CLAUDICATION, BILATERAL LEGS (HCC): ICD-10-CM

## 2023-06-13 DIAGNOSIS — R42 DYSEQUILIBRIUM: ICD-10-CM

## 2023-06-13 DIAGNOSIS — E78.2 MIXED HYPERLIPIDEMIA: ICD-10-CM

## 2023-06-13 DIAGNOSIS — I50.32 CHRONIC DIASTOLIC CONGESTIVE HEART FAILURE (HCC): ICD-10-CM

## 2023-06-13 DIAGNOSIS — N40.0 BENIGN PROSTATIC HYPERPLASIA, UNSPECIFIED WHETHER LOWER URINARY TRACT SYMPTOMS PRESENT: ICD-10-CM

## 2023-06-13 DIAGNOSIS — J44.9 CHRONIC OBSTRUCTIVE ASTHMA (HCC): ICD-10-CM

## 2023-06-13 DIAGNOSIS — Z13.5 DIABETIC RETINOPATHY SCREENING: ICD-10-CM

## 2023-06-13 DIAGNOSIS — Z13.29 SCREENING FOR THYROID DISORDER: ICD-10-CM

## 2023-06-13 DIAGNOSIS — I13.0 HYPERTENSIVE HEART AND KIDNEY DISEASE WITH CHRONIC DIASTOLIC CONGESTIVE HEART FAILURE AND STAGE 4 CHRONIC KIDNEY DISEASE (HCC): ICD-10-CM

## 2023-06-13 DIAGNOSIS — G47.33 OSA (OBSTRUCTIVE SLEEP APNEA): ICD-10-CM

## 2023-06-13 DIAGNOSIS — Z79.4 TYPE 2 DIABETES MELLITUS WITH DIABETIC NEPHROPATHY, WITH LONG-TERM CURRENT USE OF INSULIN (HCC): ICD-10-CM

## 2023-06-13 DIAGNOSIS — I10 PRIMARY HYPERTENSION: ICD-10-CM

## 2023-06-13 DIAGNOSIS — E11.21 DIABETIC NEPHROPATHY ASSOCIATED WITH TYPE 2 DIABETES MELLITUS (HCC): ICD-10-CM

## 2023-06-13 DIAGNOSIS — G95.9 CERVICAL MYELOPATHY (HCC): ICD-10-CM

## 2023-06-13 DIAGNOSIS — M10.9 GOUT, UNSPECIFIED CAUSE, UNSPECIFIED CHRONICITY, UNSPECIFIED SITE: ICD-10-CM

## 2023-06-13 DIAGNOSIS — E11.21 TYPE 2 DIABETES MELLITUS WITH DIABETIC NEPHROPATHY, WITH LONG-TERM CURRENT USE OF INSULIN (HCC): ICD-10-CM

## 2023-06-13 PROCEDURE — 1160F RVW MEDS BY RX/DR IN RCRD: CPT | Performed by: INTERNAL MEDICINE

## 2023-06-13 PROCEDURE — 1125F AMNT PAIN NOTED PAIN PRSNT: CPT | Performed by: INTERNAL MEDICINE

## 2023-06-13 PROCEDURE — 96160 PT-FOCUSED HLTH RISK ASSMT: CPT | Performed by: INTERNAL MEDICINE

## 2023-06-13 PROCEDURE — 1170F FXNL STATUS ASSESSED: CPT | Performed by: INTERNAL MEDICINE

## 2023-06-13 PROCEDURE — 1159F MED LIST DOCD IN RCRD: CPT | Performed by: INTERNAL MEDICINE

## 2023-06-13 PROCEDURE — G0439 PPPS, SUBSEQ VISIT: HCPCS | Performed by: INTERNAL MEDICINE

## 2023-06-13 PROCEDURE — 3008F BODY MASS INDEX DOCD: CPT | Performed by: INTERNAL MEDICINE

## 2023-06-13 PROCEDURE — 3077F SYST BP >= 140 MM HG: CPT | Performed by: INTERNAL MEDICINE

## 2023-06-13 PROCEDURE — 3078F DIAST BP <80 MM HG: CPT | Performed by: INTERNAL MEDICINE

## 2023-06-13 RX ORDER — FELODIPINE 10 MG/1
10 TABLET, EXTENDED RELEASE ORAL DAILY
Qty: 90 TABLET | Refills: 3 | Status: SHIPPED | OUTPATIENT
Start: 2023-06-13

## 2023-06-13 RX ORDER — TAMSULOSIN HYDROCHLORIDE 0.4 MG/1
0.4 CAPSULE ORAL DAILY
Qty: 90 CAPSULE | Refills: 3 | Status: CANCELLED | OUTPATIENT
Start: 2023-06-13

## 2023-06-13 NOTE — TELEPHONE ENCOUNTER
New order along with facesheet and office note faxed to [facility] at [phone]  Confirmation recieved

## 2023-06-13 NOTE — TELEPHONE ENCOUNTER
To DR. RICHEY -called Jennifer order for cane cannot b e faxed - you would have to go to website     Contact [employer] com and enroll   Do you want to do this or enter order for HME?   All notes on your desk

## 2023-06-23 ENCOUNTER — LAB ENCOUNTER (OUTPATIENT)
Dept: LAB | Facility: HOSPITAL | Age: 78
End: 2023-06-23
Attending: INTERNAL MEDICINE
Payer: MEDICARE

## 2023-06-23 ENCOUNTER — TELEPHONE (OUTPATIENT)
Dept: INTERNAL MEDICINE CLINIC | Facility: CLINIC | Age: 78
End: 2023-06-23

## 2023-06-23 DIAGNOSIS — E55.9 VITAMIN D DEFICIENCY: ICD-10-CM

## 2023-06-23 DIAGNOSIS — E11.21 TYPE 2 DIABETES MELLITUS WITH DIABETIC NEPHROPATHY, WITH LONG-TERM CURRENT USE OF INSULIN (HCC): ICD-10-CM

## 2023-06-23 DIAGNOSIS — Z12.5 SCREENING FOR PROSTATE CANCER: ICD-10-CM

## 2023-06-23 DIAGNOSIS — Z00.00 ANNUAL PHYSICAL EXAM: ICD-10-CM

## 2023-06-23 DIAGNOSIS — E78.2 MIXED HYPERLIPIDEMIA: ICD-10-CM

## 2023-06-23 DIAGNOSIS — Z13.29 SCREENING FOR THYROID DISORDER: ICD-10-CM

## 2023-06-23 DIAGNOSIS — D63.1 ANEMIA OF CHRONIC RENAL FAILURE, STAGE 5 (HCC): ICD-10-CM

## 2023-06-23 DIAGNOSIS — N18.5 ANEMIA OF CHRONIC RENAL FAILURE, STAGE 5 (HCC): ICD-10-CM

## 2023-06-23 DIAGNOSIS — Z79.4 TYPE 2 DIABETES MELLITUS WITH DIABETIC NEPHROPATHY, WITH LONG-TERM CURRENT USE OF INSULIN (HCC): ICD-10-CM

## 2023-06-23 DIAGNOSIS — Z13.89 SCREENING FOR NEPHROPATHY: ICD-10-CM

## 2023-06-23 LAB
ALBUMIN SERPL-MCNC: 3.3 G/DL (ref 3.4–5)
ALBUMIN/GLOB SERPL: 0.8 {RATIO} (ref 1–2)
ALP LIVER SERPL-CCNC: 50 U/L
ALT SERPL-CCNC: 28 U/L
ANION GAP SERPL CALC-SCNC: 7 MMOL/L (ref 0–18)
AST SERPL-CCNC: 30 U/L (ref 15–37)
BASOPHILS # BLD AUTO: 0.06 X10(3) UL (ref 0–0.2)
BASOPHILS NFR BLD AUTO: 0.7 %
BILIRUB SERPL-MCNC: 0.4 MG/DL (ref 0.1–2)
BUN BLD-MCNC: 68 MG/DL (ref 7–18)
BUN/CREAT SERPL: 8.1 (ref 10–20)
CALCIUM BLD-MCNC: 9.6 MG/DL (ref 8.5–10.1)
CHLORIDE SERPL-SCNC: 104 MMOL/L (ref 98–112)
CHOLEST SERPL-MCNC: 170 MG/DL (ref ?–200)
CO2 SERPL-SCNC: 32 MMOL/L (ref 21–32)
COMPLEXED PSA SERPL-MCNC: 3.57 NG/ML (ref ?–4)
CREAT BLD-MCNC: 8.44 MG/DL
CREAT UR-SCNC: 83.9 MG/DL
DEPRECATED RDW RBC AUTO: 57.6 FL (ref 35.1–46.3)
EOSINOPHIL # BLD AUTO: 0.29 X10(3) UL (ref 0–0.7)
EOSINOPHIL NFR BLD AUTO: 3.5 %
ERYTHROCYTE [DISTWIDTH] IN BLOOD BY AUTOMATED COUNT: 16.6 % (ref 11–15)
EST. AVERAGE GLUCOSE BLD GHB EST-MCNC: 128 MG/DL (ref 68–126)
FASTING PATIENT LIPID ANSWER: YES
FASTING STATUS PATIENT QL REPORTED: YES
GFR SERPLBLD BASED ON 1.73 SQ M-ARVRAT: 6 ML/MIN/1.73M2 (ref 60–?)
GLOBULIN PLAS-MCNC: 4.4 G/DL (ref 2.8–4.4)
GLUCOSE BLD-MCNC: 108 MG/DL (ref 70–99)
HBA1C MFR BLD: 6.1 % (ref ?–5.7)
HCT VFR BLD AUTO: 31.9 %
HDLC SERPL-MCNC: 43 MG/DL (ref 40–59)
HGB BLD-MCNC: 10 G/DL
IMM GRANULOCYTES # BLD AUTO: 0.04 X10(3) UL (ref 0–1)
IMM GRANULOCYTES NFR BLD: 0.5 %
LDLC SERPL CALC-MCNC: 103 MG/DL (ref ?–100)
LYMPHOCYTES # BLD AUTO: 1.64 X10(3) UL (ref 1–4)
LYMPHOCYTES NFR BLD AUTO: 20 %
MCH RBC QN AUTO: 30.2 PG (ref 26–34)
MCHC RBC AUTO-ENTMCNC: 31.3 G/DL (ref 31–37)
MCV RBC AUTO: 96.4 FL
MICROALBUMIN UR-MCNC: 101 MG/DL
MICROALBUMIN/CREAT 24H UR-RTO: 1203.8 UG/MG (ref ?–30)
MONOCYTES # BLD AUTO: 0.75 X10(3) UL (ref 0.1–1)
MONOCYTES NFR BLD AUTO: 9.1 %
NEUTROPHILS # BLD AUTO: 5.44 X10 (3) UL (ref 1.5–7.7)
NEUTROPHILS # BLD AUTO: 5.44 X10(3) UL (ref 1.5–7.7)
NEUTROPHILS NFR BLD AUTO: 66.2 %
NONHDLC SERPL-MCNC: 127 MG/DL (ref ?–130)
OSMOLALITY SERPL CALC.SUM OF ELEC: 316 MOSM/KG (ref 275–295)
PLATELET # BLD AUTO: 224 10(3)UL (ref 150–450)
POTASSIUM SERPL-SCNC: 4.4 MMOL/L (ref 3.5–5.1)
PROT SERPL-MCNC: 7.7 G/DL (ref 6.4–8.2)
RBC # BLD AUTO: 3.31 X10(6)UL
SODIUM SERPL-SCNC: 143 MMOL/L (ref 136–145)
TRIGL SERPL-MCNC: 132 MG/DL (ref 30–149)
TSI SER-ACNC: 2.06 MIU/ML (ref 0.36–3.74)
VIT D+METAB SERPL-MCNC: 42.5 NG/ML (ref 30–100)
VLDLC SERPL CALC-MCNC: 22 MG/DL (ref 0–30)
WBC # BLD AUTO: 8.2 X10(3) UL (ref 4–11)

## 2023-06-23 PROCEDURE — 82043 UR ALBUMIN QUANTITATIVE: CPT

## 2023-06-23 PROCEDURE — 82570 ASSAY OF URINE CREATININE: CPT

## 2023-06-23 PROCEDURE — 80061 LIPID PANEL: CPT

## 2023-06-23 PROCEDURE — 85025 COMPLETE CBC W/AUTO DIFF WBC: CPT

## 2023-06-23 PROCEDURE — 36415 COLL VENOUS BLD VENIPUNCTURE: CPT

## 2023-06-23 PROCEDURE — 82306 VITAMIN D 25 HYDROXY: CPT

## 2023-06-23 PROCEDURE — 80053 COMPREHEN METABOLIC PANEL: CPT

## 2023-06-23 PROCEDURE — 83036 HEMOGLOBIN GLYCOSYLATED A1C: CPT

## 2023-06-23 PROCEDURE — 84443 ASSAY THYROID STIM HORMONE: CPT

## 2023-07-06 ENCOUNTER — TELEPHONE (OUTPATIENT)
Dept: ENDOCRINOLOGY CLINIC | Facility: CLINIC | Age: 78
End: 2023-07-06

## 2023-07-06 NOTE — TELEPHONE ENCOUNTER
Received fax from [facility] foot. They are requesting provider signs attached statement for diabetic shoes, and send recent chart note. Completed and placed in providers folder.

## 2023-07-07 NOTE — TELEPHONE ENCOUNTER
Signed consultation note faxed to [facility] Physician at [phone] in Michelle Rodas [facility] folder

## 2023-07-22 ENCOUNTER — APPOINTMENT (OUTPATIENT)
Dept: GENERAL RADIOLOGY | Facility: HOSPITAL | Age: 78
End: 2023-07-22
Attending: NURSE PRACTITIONER
Payer: MEDICARE

## 2023-07-22 ENCOUNTER — HOSPITAL ENCOUNTER (EMERGENCY)
Facility: HOSPITAL | Age: 78
Discharge: HOME | End: 2023-07-22
Payer: MEDICARE

## 2023-07-22 ENCOUNTER — APPOINTMENT (OUTPATIENT)
Dept: ULTRASOUND IMAGING | Facility: HOSPITAL | Age: 78
End: 2023-07-22
Attending: NURSE PRACTITIONER
Payer: MEDICARE

## 2023-07-22 VITALS
RESPIRATION RATE: 18 BRPM | TEMPERATURE: 98 F | BODY MASS INDEX: 28.53 KG/M2 | DIASTOLIC BLOOD PRESSURE: 74 MMHG | HEART RATE: 78 BPM | HEIGHT: 63 IN | SYSTOLIC BLOOD PRESSURE: 136 MMHG | WEIGHT: 161 LBS | OXYGEN SATURATION: 99 %

## 2023-07-22 DIAGNOSIS — M25.561 ACUTE PAIN OF RIGHT KNEE: ICD-10-CM

## 2023-07-22 DIAGNOSIS — M25.521 RIGHT ELBOW PAIN: ICD-10-CM

## 2023-07-22 DIAGNOSIS — S23.41XA SPRAIN OF COSTAL CARTILAGE, INITIAL ENCOUNTER: Primary | ICD-10-CM

## 2023-07-22 DIAGNOSIS — M25.551 PAIN OF RIGHT HIP: ICD-10-CM
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
## 2023-07-22 LAB
BILIRUB UR QL: NEGATIVE
CLARITY UR: CLEAR
GLUCOSE BLDC GLUCOMTR-MCNC: 66 MG/DL (ref 70–99)
GLUCOSE BLDC GLUCOMTR-MCNC: 78 MG/DL (ref 70–99)
GLUCOSE UR-MCNC: NORMAL MG/DL
HYALINE CASTS #/AREA URNS AUTO: PRESENT /LPF
KETONES UR-MCNC: NEGATIVE MG/DL
LEUKOCYTE ESTERASE UR QL STRIP.AUTO: NEGATIVE
NITRITE UR QL STRIP.AUTO: NEGATIVE
PH UR: 7.5 [PH] (ref 5–8)
PROT UR-MCNC: 300 MG/DL
SP GR UR STRIP: 1.01 (ref 1–1.03)
UROBILINOGEN UR STRIP-ACNC: NORMAL

## 2023-07-22 PROCEDURE — 73080 X-RAY EXAM OF ELBOW: CPT | Performed by: NURSE PRACTITIONER

## 2023-07-22 PROCEDURE — 93990 DOPPLER FLOW TESTING: CPT | Performed by: NURSE PRACTITIONER

## 2023-07-22 PROCEDURE — 81001 URINALYSIS AUTO W/SCOPE: CPT | Performed by: NURSE PRACTITIONER

## 2023-07-22 PROCEDURE — 99285 EMERGENCY DEPT VISIT HI MDM: CPT

## 2023-07-22 PROCEDURE — 73502 X-RAY EXAM HIP UNI 2-3 VIEWS: CPT | Performed by: NURSE PRACTITIONER

## 2023-07-22 PROCEDURE — 71101 X-RAY EXAM UNILAT RIBS/CHEST: CPT | Performed by: NURSE PRACTITIONER

## 2023-07-22 PROCEDURE — 73560 X-RAY EXAM OF KNEE 1 OR 2: CPT | Performed by: NURSE PRACTITIONER

## 2023-07-22 PROCEDURE — 82962 GLUCOSE BLOOD TEST: CPT

## 2023-07-22 RX ORDER — TRAMADOL HYDROCHLORIDE 50 MG/1
50 TABLET ORAL ONCE
Status: COMPLETED | OUTPATIENT
Start: 2023-07-22 | End: 2023-07-22

## 2023-07-22 RX ORDER — ACETAMINOPHEN 500 MG
1000 TABLET ORAL ONCE
Status: COMPLETED | OUTPATIENT
Start: 2023-07-22 | End: 2023-07-22

## 2023-07-22 RX ORDER — TRAMADOL HYDROCHLORIDE 50 MG/1
TABLET ORAL EVERY 6 HOURS PRN
Qty: 10 TABLET | Refills: 0 | Status: SHIPPED | OUTPATIENT
Start: 2023-07-22 | End: 2023-07-27

## 2023-07-22 NOTE — ED INITIAL ASSESSMENT (HPI)
Pt to the ed for mechanical fall pta  Reports tripping over a hose while washing the deck, and falling onto his right side  Complaining of right hip, right elbow, right rib rib pain  Denies LOC   No head injury

## 2023-07-22 NOTE — DISCHARGE INSTRUCTIONS
Please follow-up your surgeon and or vascular surgeon for possible formal evaluation of your new fistula after falling on it. You may take over-the-counter Tylenol and ice the areas that are in pain as needed. If there are any acute changes or worsening of pain please follow-up in the emergency room.

## 2023-07-24 ENCOUNTER — HOSPITAL ENCOUNTER (EMERGENCY)
Facility: HOSPITAL | Age: 78
Discharge: HOME | End: 2023-07-25
Attending: EMERGENCY MEDICINE
Payer: MEDICARE

## 2023-07-24 ENCOUNTER — PATIENT OUTREACH (OUTPATIENT)
Dept: CASE MANAGEMENT | Age: 78
End: 2023-07-24

## 2023-07-24 DIAGNOSIS — R11.2 NAUSEA AND VOMITING, UNSPECIFIED VOMITING TYPE: Primary | ICD-10-CM

## 2023-07-24 PROCEDURE — 96361 HYDRATE IV INFUSION ADD-ON: CPT

## 2023-07-24 PROCEDURE — 99284 EMERGENCY DEPT VISIT MOD MDM: CPT

## 2023-07-24 PROCEDURE — 96374 THER/PROPH/DIAG INJ IV PUSH: CPT

## 2023-07-24 NOTE — PROGRESS NOTES
1st attempt ED f/up apt request   GEN SURG -decline, pt stated he can make own apt  PCP -decline, pt stated he can make own apt  Closing encounter

## 2023-07-25 ENCOUNTER — PATIENT OUTREACH (OUTPATIENT)
Dept: CASE MANAGEMENT | Age: 78
End: 2023-07-25

## 2023-07-25 VITALS
TEMPERATURE: 99 F | HEIGHT: 63 IN | OXYGEN SATURATION: 99 % | SYSTOLIC BLOOD PRESSURE: 180 MMHG | DIASTOLIC BLOOD PRESSURE: 75 MMHG | RESPIRATION RATE: 18 BRPM | BODY MASS INDEX: 28.7 KG/M2 | WEIGHT: 162 LBS | HEART RATE: 73 BPM

## 2023-07-25 LAB
ALBUMIN SERPL-MCNC: 3.6 G/DL (ref 3.4–5)
ALP LIVER SERPL-CCNC: 47 U/L
ALT SERPL-CCNC: 24 U/L
ANION GAP SERPL CALC-SCNC: 7 MMOL/L (ref 0–18)
AST SERPL-CCNC: 27 U/L (ref 15–37)
BASOPHILS # BLD AUTO: 0.05 X10(3) UL (ref 0–0.2)
BASOPHILS NFR BLD AUTO: 0.5 %
BILIRUB DIRECT SERPL-MCNC: 0.1 MG/DL (ref 0–0.2)
BILIRUB SERPL-MCNC: 0.7 MG/DL (ref 0.1–2)
BUN BLD-MCNC: 37 MG/DL (ref 7–18)
BUN/CREAT SERPL: 6.6 (ref 10–20)
CALCIUM BLD-MCNC: 9.9 MG/DL (ref 8.5–10.1)
CHLORIDE SERPL-SCNC: 94 MMOL/L (ref 98–112)
CO2 SERPL-SCNC: 33 MMOL/L (ref 21–32)
CREAT BLD-MCNC: 5.6 MG/DL
DEPRECATED RDW RBC AUTO: 59.7 FL (ref 35.1–46.3)
EGFRCR SERPLBLD CKD-EPI 2021: 10 ML/MIN/1.73M2 (ref 60–?)
EOSINOPHIL # BLD AUTO: 0.23 X10(3) UL (ref 0–0.7)
EOSINOPHIL NFR BLD AUTO: 2.5 %
ERYTHROCYTE [DISTWIDTH] IN BLOOD BY AUTOMATED COUNT: 16.7 % (ref 11–15)
GLUCOSE BLD-MCNC: 131 MG/DL (ref 70–99)
GLUCOSE BLDC GLUCOMTR-MCNC: 122 MG/DL (ref 70–99)
HCT VFR BLD AUTO: 37.3 %
HGB BLD-MCNC: 11.8 G/DL
IMM GRANULOCYTES # BLD AUTO: 0.06 X10(3) UL (ref 0–1)
IMM GRANULOCYTES NFR BLD: 0.6 %
LIPASE SERPL-CCNC: 156 U/L (ref 13–75)
LYMPHOCYTES # BLD AUTO: 1.28 X10(3) UL (ref 1–4)
LYMPHOCYTES NFR BLD AUTO: 13.8 %
MCH RBC QN AUTO: 31 PG (ref 26–34)
MCHC RBC AUTO-ENTMCNC: 31.6 G/DL (ref 31–37)
MCV RBC AUTO: 97.9 FL
MONOCYTES # BLD AUTO: 0.6 X10(3) UL (ref 0.1–1)
MONOCYTES NFR BLD AUTO: 6.5 %
NEUTROPHILS # BLD AUTO: 7.05 X10 (3) UL (ref 1.5–7.7)
NEUTROPHILS # BLD AUTO: 7.05 X10(3) UL (ref 1.5–7.7)
NEUTROPHILS NFR BLD AUTO: 76.1 %
OSMOLALITY SERPL CALC.SUM OF ELEC: 288 MOSM/KG (ref 275–295)
PLATELET # BLD AUTO: 182 10(3)UL (ref 150–450)
POTASSIUM SERPL-SCNC: 4.7 MMOL/L (ref 3.5–5.1)
PROT SERPL-MCNC: 7.9 G/DL (ref 6.4–8.2)
RBC # BLD AUTO: 3.81 X10(6)UL
SODIUM SERPL-SCNC: 134 MMOL/L (ref 136–145)
WBC # BLD AUTO: 9.3 X10(3) UL (ref 4–11)

## 2023-07-25 PROCEDURE — 80076 HEPATIC FUNCTION PANEL: CPT | Performed by: EMERGENCY MEDICINE

## 2023-07-25 PROCEDURE — 82962 GLUCOSE BLOOD TEST: CPT

## 2023-07-25 PROCEDURE — 80048 BASIC METABOLIC PNL TOTAL CA: CPT | Performed by: EMERGENCY MEDICINE

## 2023-07-25 PROCEDURE — 85025 COMPLETE CBC W/AUTO DIFF WBC: CPT | Performed by: EMERGENCY MEDICINE

## 2023-07-25 PROCEDURE — 83690 ASSAY OF LIPASE: CPT | Performed by: EMERGENCY MEDICINE

## 2023-07-25 RX ORDER — ONDANSETRON 2 MG/ML
4 INJECTION INTRAMUSCULAR; INTRAVENOUS ONCE
Status: COMPLETED | OUTPATIENT
Start: 2023-07-25 | End: 2023-07-25

## 2023-07-25 RX ORDER — ONDANSETRON 4 MG/1
4 TABLET, ORALLY DISINTEGRATING ORAL EVERY 4 HOURS PRN
Qty: 10 TABLET | Refills: 0 | Status: SHIPPED | OUTPATIENT
Start: 2023-07-25 | End: 2023-08-01

## 2023-07-25 NOTE — ED INITIAL ASSESSMENT (HPI)
Pt to ED with c/o nausea and vomiting. Pt states onset of vomiting after completion of dialysis Tx this evening. Pt states mild abdominal pain.

## 2023-07-25 NOTE — PROGRESS NOTES
1st attempt ED f/up apt request     Wili Parmar  PCP  [id number] [facility]  [phone]  Apt:  Aug 1 @5:30pm     Confirmed w/ pt  Closing encounter

## 2023-07-26 ENCOUNTER — TELEPHONE (OUTPATIENT)
Dept: INTERNAL MEDICINE CLINIC | Facility: CLINIC | Age: 78
End: 2023-07-26

## 2023-07-26 NOTE — TELEPHONE ENCOUNTER
Received a medical records request for the patient from [address] Request faxed to scan stat. Original sent to scanning, copy placed in EMA hold drawer.

## 2023-08-01 ENCOUNTER — OFFICE VISIT (OUTPATIENT)
Dept: INTERNAL MEDICINE CLINIC | Facility: CLINIC | Age: 78
End: 2023-08-01

## 2023-08-01 VITALS
BODY MASS INDEX: 29.06 KG/M2 | TEMPERATURE: 99 F | RESPIRATION RATE: 16 BRPM | WEIGHT: 164 LBS | SYSTOLIC BLOOD PRESSURE: 148 MMHG | HEIGHT: 63 IN | OXYGEN SATURATION: 98 % | HEART RATE: 72 BPM | DIASTOLIC BLOOD PRESSURE: 72 MMHG

## 2023-08-01 DIAGNOSIS — Z79.4 TYPE 2 DIABETES MELLITUS WITH DIABETIC NEPHROPATHY, WITH LONG-TERM CURRENT USE OF INSULIN (HCC): ICD-10-CM

## 2023-08-01 DIAGNOSIS — N18.5 ANEMIA OF CHRONIC RENAL FAILURE, STAGE 5: ICD-10-CM

## 2023-08-01 DIAGNOSIS — R25.2 MUSCLE CRAMPING: ICD-10-CM

## 2023-08-01 DIAGNOSIS — E11.21 TYPE 2 DIABETES MELLITUS WITH DIABETIC NEPHROPATHY, WITH LONG-TERM CURRENT USE OF INSULIN (HCC): ICD-10-CM

## 2023-08-01 DIAGNOSIS — G25.81 RESTLESS LEG: ICD-10-CM

## 2023-08-01 DIAGNOSIS — E53.8 VITAMIN B12 DEFICIENCY: ICD-10-CM

## 2023-08-01 DIAGNOSIS — K52.9 GASTROENTERITIS: Primary | ICD-10-CM

## 2023-08-01 DIAGNOSIS — I50.32 CHRONIC DIASTOLIC CONGESTIVE HEART FAILURE (HCC): ICD-10-CM

## 2023-08-01 DIAGNOSIS — D63.1 ANEMIA OF CHRONIC RENAL FAILURE, STAGE 5: ICD-10-CM

## 2023-08-01 DIAGNOSIS — E78.2 MIXED HYPERLIPIDEMIA: ICD-10-CM

## 2023-08-01 DIAGNOSIS — I10 ESSENTIAL HYPERTENSION: ICD-10-CM

## 2023-08-01 DIAGNOSIS — I27.20 PULMONARY HTN (HCC): ICD-10-CM

## 2023-08-01 DIAGNOSIS — I10 PRIMARY HYPERTENSION: ICD-10-CM

## 2023-08-01 DIAGNOSIS — D50.9 IRON DEFICIENCY ANEMIA, UNSPECIFIED IRON DEFICIENCY ANEMIA TYPE: ICD-10-CM

## 2023-08-01 PROCEDURE — 99215 OFFICE O/P EST HI 40 MIN: CPT | Performed by: INTERNAL MEDICINE

## 2023-08-01 PROCEDURE — 3078F DIAST BP <80 MM HG: CPT | Performed by: INTERNAL MEDICINE

## 2023-08-01 PROCEDURE — 1159F MED LIST DOCD IN RCRD: CPT | Performed by: INTERNAL MEDICINE

## 2023-08-01 PROCEDURE — 3008F BODY MASS INDEX DOCD: CPT | Performed by: INTERNAL MEDICINE

## 2023-08-01 PROCEDURE — 1125F AMNT PAIN NOTED PAIN PRSNT: CPT | Performed by: INTERNAL MEDICINE

## 2023-08-01 PROCEDURE — 3077F SYST BP >= 140 MM HG: CPT | Performed by: INTERNAL MEDICINE

## 2023-08-01 PROCEDURE — 1160F RVW MEDS BY RX/DR IN RCRD: CPT | Performed by: INTERNAL MEDICINE

## 2023-08-01 RX ORDER — CARVEDILOL 12.5 MG/1
12.5 TABLET ORAL 2 TIMES DAILY WITH MEALS
Qty: 180 TABLET | Refills: 3 | Status: SHIPPED | OUTPATIENT
Start: 2023-08-01

## 2023-08-01 RX ORDER — METHOCARBAMOL 500 MG/1
500 TABLET, FILM COATED ORAL EVERY 12 HOURS PRN
Qty: 60 TABLET | Refills: 0 | Status: SHIPPED | OUTPATIENT
Start: 2023-08-01 | End: 2023-08-01

## 2023-08-01 RX ORDER — PANTOPRAZOLE SODIUM 40 MG/1
40 TABLET, DELAYED RELEASE ORAL
COMMUNITY
End: 2023-08-01

## 2023-08-01 RX ORDER — PANTOPRAZOLE SODIUM 40 MG/1
40 TABLET, DELAYED RELEASE ORAL
Qty: 90 TABLET | Refills: 3 | Status: SHIPPED | OUTPATIENT
Start: 2023-08-01

## 2023-08-01 RX ORDER — CIPROFLOXACIN AND DEXAMETHASONE 3; 1 MG/ML; MG/ML
4 SUSPENSION/ DROPS AURICULAR (OTIC) 2 TIMES DAILY
COMMUNITY
Start: 2023-07-15

## 2023-08-01 RX ORDER — METHOCARBAMOL 500 MG/1
500 TABLET, FILM COATED ORAL 2 TIMES DAILY PRN
Qty: 60 TABLET | Refills: 1 | Status: SHIPPED | OUTPATIENT
Start: 2023-08-01

## 2023-08-01 RX ORDER — ATORVASTATIN CALCIUM 40 MG/1
40 TABLET, FILM COATED ORAL NIGHTLY
Qty: 90 TABLET | Refills: 3 | Status: SHIPPED | OUTPATIENT
Start: 2023-08-01

## 2023-08-01 RX ORDER — FELODIPINE 10 MG/1
10 TABLET, EXTENDED RELEASE ORAL DAILY
Qty: 90 TABLET | Refills: 3 | Status: SHIPPED | OUTPATIENT
Start: 2023-08-01

## 2023-08-01 RX ORDER — FLUTICASONE PROPIONATE 50 MCG
2 SPRAY, SUSPENSION (ML) NASAL DAILY
Qty: 48 G | Refills: 3 | Status: SHIPPED | OUTPATIENT
Start: 2023-08-01

## 2023-08-01 NOTE — PATIENT INSTRUCTIONS
You present hospitalization based on examination and review of your ER visit, your symptoms were likely related to gastroenteritis likely viral infection of the stomach that improved over time with symptomatic support. Continue monitoring for any central food triggers or recurrence of symptoms  - May use Zofran on as-needed basis as was prescribed  - Make sure you are taking good nutrition and hydration    We did focus on your muscle cramping that you have been experiencing around the time of dialysis and at nighttime. We are checking some other vitamin levels, in addition to the electrolytes. No fasting is required for this test.  You may get them done at any time  - In the meantime, lets stop cyclobenzaprine, and start Robaxin/methocarbamol 500 mg at nighttime for sleep.   This medication can cause drowsiness, sleepiness but the goal is to relax the smooth muscles and skeletal muscles and help alleviate the cramping and pain from the fall    Continue following up for scheduled hemodialysis sessions    Return to clinic in 2-3 months for follow-up

## 2023-08-02 ENCOUNTER — TELEPHONE (OUTPATIENT)
Dept: INTERNAL MEDICINE CLINIC | Facility: CLINIC | Age: 78
End: 2023-08-02

## 2023-08-02 NOTE — TELEPHONE ENCOUNTER
[facility] faxing requesting for alternative / PA  Methecarbamol 500 mg tab    Please send new rx for preferred medication per ins or PA      ID [id number]  Plan Phone [phone]    Placed in purple folder

## 2023-08-05 ENCOUNTER — LAB ENCOUNTER (OUTPATIENT)
Dept: LAB | Facility: HOSPITAL | Age: 78
End: 2023-08-05
Attending: INTERNAL MEDICINE
Payer: MEDICARE

## 2023-08-05 DIAGNOSIS — N18.5 ANEMIA OF CHRONIC RENAL FAILURE, STAGE 5: ICD-10-CM

## 2023-08-05 DIAGNOSIS — G25.81 RESTLESS LEG: ICD-10-CM

## 2023-08-05 DIAGNOSIS — Z79.4 TYPE 2 DIABETES MELLITUS WITH DIABETIC NEPHROPATHY, WITH LONG-TERM CURRENT USE OF INSULIN (HCC): ICD-10-CM

## 2023-08-05 DIAGNOSIS — D50.9 IRON DEFICIENCY ANEMIA, UNSPECIFIED IRON DEFICIENCY ANEMIA TYPE: ICD-10-CM

## 2023-08-05 DIAGNOSIS — I50.32 CHRONIC DIASTOLIC CONGESTIVE HEART FAILURE (HCC): ICD-10-CM

## 2023-08-05 DIAGNOSIS — E53.8 VITAMIN B12 DEFICIENCY: ICD-10-CM

## 2023-08-05 DIAGNOSIS — E11.21 TYPE 2 DIABETES MELLITUS WITH DIABETIC NEPHROPATHY, WITH LONG-TERM CURRENT USE OF INSULIN (HCC): ICD-10-CM

## 2023-08-05 DIAGNOSIS — D63.1 ANEMIA OF CHRONIC RENAL FAILURE, STAGE 5: ICD-10-CM

## 2023-08-05 DIAGNOSIS — R25.2 MUSCLE CRAMPING: ICD-10-CM

## 2023-08-05 LAB
ALBUMIN SERPL-MCNC: 3.2 G/DL (ref 3.4–5)
ALBUMIN/GLOB SERPL: 0.8 {RATIO} (ref 1–2)
ALP LIVER SERPL-CCNC: 46 U/L
ALT SERPL-CCNC: 32 U/L
ANION GAP SERPL CALC-SCNC: 8 MMOL/L (ref 0–18)
AST SERPL-CCNC: 31 U/L (ref 15–37)
BASOPHILS # BLD AUTO: 0.04 X10(3) UL (ref 0–0.2)
BASOPHILS NFR BLD AUTO: 0.6 %
BILIRUB SERPL-MCNC: 0.5 MG/DL (ref 0.1–2)
BUN BLD-MCNC: 41 MG/DL (ref 7–18)
BUN/CREAT SERPL: 6.8 (ref 10–20)
CALCIUM BLD-MCNC: 9.6 MG/DL (ref 8.5–10.1)
CHLORIDE SERPL-SCNC: 101 MMOL/L (ref 98–112)
CK SERPL-CCNC: 124 U/L
CO2 SERPL-SCNC: 32 MMOL/L (ref 21–32)
CREAT BLD-MCNC: 6.04 MG/DL
DEPRECATED HBV CORE AB SER IA-ACNC: 763.1 NG/ML
DEPRECATED RDW RBC AUTO: 54.5 FL (ref 35.1–46.3)
EGFRCR SERPLBLD CKD-EPI 2021: 9 ML/MIN/1.73M2 (ref 60–?)
EOSINOPHIL # BLD AUTO: 0.18 X10(3) UL (ref 0–0.7)
EOSINOPHIL NFR BLD AUTO: 2.7 %
ERYTHROCYTE [DISTWIDTH] IN BLOOD BY AUTOMATED COUNT: 15.4 % (ref 11–15)
FASTING STATUS PATIENT QL REPORTED: NO
GLOBULIN PLAS-MCNC: 4 G/DL (ref 2.8–4.4)
GLUCOSE BLD-MCNC: 129 MG/DL (ref 70–99)
HCT VFR BLD AUTO: 32.2 %
HGB BLD-MCNC: 10.3 G/DL
IMM GRANULOCYTES # BLD AUTO: 0.02 X10(3) UL (ref 0–1)
IMM GRANULOCYTES NFR BLD: 0.3 %
IRON SATN MFR SERPL: 38 %
IRON SERPL-MCNC: 95 UG/DL
LYMPHOCYTES # BLD AUTO: 1.5 X10(3) UL (ref 1–4)
LYMPHOCYTES NFR BLD AUTO: 22.4 %
MAGNESIUM SERPL-MCNC: 2.1 MG/DL (ref 1.6–2.6)
MCH RBC QN AUTO: 31 PG (ref 26–34)
MCHC RBC AUTO-ENTMCNC: 32 G/DL (ref 31–37)
MCV RBC AUTO: 97 FL
MONOCYTES # BLD AUTO: 0.48 X10(3) UL (ref 0.1–1)
MONOCYTES NFR BLD AUTO: 7.2 %
NEUTROPHILS # BLD AUTO: 4.48 X10 (3) UL (ref 1.5–7.7)
NEUTROPHILS # BLD AUTO: 4.48 X10(3) UL (ref 1.5–7.7)
NEUTROPHILS NFR BLD AUTO: 66.8 %
OSMOLALITY SERPL CALC.SUM OF ELEC: 304 MOSM/KG (ref 275–295)
PLATELET # BLD AUTO: 191 10(3)UL (ref 150–450)
POTASSIUM SERPL-SCNC: 4.3 MMOL/L (ref 3.5–5.1)
PROT SERPL-MCNC: 7.2 G/DL (ref 6.4–8.2)
RBC # BLD AUTO: 3.32 X10(6)UL
SODIUM SERPL-SCNC: 141 MMOL/L (ref 136–145)
TIBC SERPL-MCNC: 249 UG/DL (ref 240–450)
TRANSFERRIN SERPL-MCNC: 167 MG/DL (ref 200–360)
VIT B12 SERPL-MCNC: 631 PG/ML (ref 193–986)
WBC # BLD AUTO: 6.7 X10(3) UL (ref 4–11)

## 2023-08-05 PROCEDURE — 82728 ASSAY OF FERRITIN: CPT

## 2023-08-05 PROCEDURE — 83540 ASSAY OF IRON: CPT

## 2023-08-05 PROCEDURE — 82550 ASSAY OF CK (CPK): CPT

## 2023-08-05 PROCEDURE — 85025 COMPLETE CBC W/AUTO DIFF WBC: CPT

## 2023-08-05 PROCEDURE — 84466 ASSAY OF TRANSFERRIN: CPT

## 2023-08-05 PROCEDURE — 82607 VITAMIN B-12: CPT

## 2023-08-05 PROCEDURE — 83735 ASSAY OF MAGNESIUM: CPT

## 2023-08-05 PROCEDURE — 36415 COLL VENOUS BLD VENIPUNCTURE: CPT

## 2023-08-05 PROCEDURE — 80053 COMPREHEN METABOLIC PANEL: CPT

## 2023-08-09 ENCOUNTER — TELEPHONE (OUTPATIENT)
Dept: ENDOCRINOLOGY CLINIC | Facility: CLINIC | Age: 78
End: 2023-08-09

## 2023-08-09 DIAGNOSIS — E11.21 DIABETIC NEPHROPATHY ASSOCIATED WITH TYPE 2 DIABETES MELLITUS (HCC): ICD-10-CM

## 2023-08-09 NOTE — TELEPHONE ENCOUNTER
Spoke to pharmacy, stated that patient has 3 refills.      Spoke to patient, will f/u with [facility]

## 2023-08-10 ENCOUNTER — TELEPHONE (OUTPATIENT)
Dept: INTERNAL MEDICINE CLINIC | Facility: CLINIC | Age: 78
End: 2023-08-10

## 2023-08-10 NOTE — TELEPHONE ENCOUNTER
Please refer the patient reviewed his blood work from 8/5    Electrolytes overall look good  Iron levels seem to be adequate  His electrolytes and muscle levels seem to be within normal limits, low suspicion these are causing his muscle cramps/spasms. Will continue the muscle relaxers for now, likely fluid shift effects from dialysis.

## 2023-09-07 ENCOUNTER — TELEPHONE (OUTPATIENT)
Dept: INTERNAL MEDICINE CLINIC | Facility: CLINIC | Age: 78
End: 2023-09-07

## 2023-09-07 ENCOUNTER — HOSPITAL ENCOUNTER (OUTPATIENT)
Age: 78
Discharge: HOME | End: 2023-09-07
Payer: MEDICARE

## 2023-09-07 VITALS
SYSTOLIC BLOOD PRESSURE: 160 MMHG | HEART RATE: 77 BPM | RESPIRATION RATE: 18 BRPM | OXYGEN SATURATION: 100 % | TEMPERATURE: 99 F | DIASTOLIC BLOOD PRESSURE: 54 MMHG

## 2023-09-07 DIAGNOSIS — H60.319 CHRONIC DIFFUSE OTITIS EXTERNA, UNSPECIFIED LATERALITY: Primary | ICD-10-CM

## 2023-09-07 DIAGNOSIS — H60.92 OTITIS EXTERNA OF LEFT EAR, UNSPECIFIED CHRONICITY, UNSPECIFIED TYPE: Primary | ICD-10-CM

## 2023-09-07 RX ORDER — CIPROFLOXACIN AND DEXAMETHASONE 3; 1 MG/ML; MG/ML
4 SUSPENSION/ DROPS AURICULAR (OTIC) 2 TIMES DAILY
Qty: 7.5 ML | Refills: 0 | Status: SHIPPED | OUTPATIENT
Start: 2023-09-07 | End: 2023-09-17

## 2023-09-07 RX ORDER — AMOXICILLIN 500 MG/1
500 TABLET, FILM COATED ORAL 3 TIMES DAILY
Qty: 30 TABLET | Refills: 0 | Status: SHIPPED | OUTPATIENT
Start: 2023-09-07 | End: 2023-09-17

## 2023-09-08 NOTE — ED INITIAL ASSESSMENT (HPI)
L ear hx of polyp removal in feb, hx of fb removal since. Pt here for continued L jaw pain, swelling and L ear drainage. Denies fevers.

## 2023-09-11 ENCOUNTER — OFFICE VISIT (OUTPATIENT)
Dept: ENDOCRINOLOGY CLINIC | Facility: CLINIC | Age: 78
End: 2023-09-11

## 2023-09-11 ENCOUNTER — TELEPHONE (OUTPATIENT)
Dept: ENDOCRINOLOGY CLINIC | Facility: CLINIC | Age: 78
End: 2023-09-11

## 2023-09-11 VITALS
HEIGHT: 63 IN | SYSTOLIC BLOOD PRESSURE: 112 MMHG | WEIGHT: 171 LBS | BODY MASS INDEX: 30.3 KG/M2 | HEART RATE: 77 BPM | DIASTOLIC BLOOD PRESSURE: 55 MMHG

## 2023-09-11 DIAGNOSIS — E11.42 DIABETIC POLYNEUROPATHY ASSOCIATED WITH TYPE 2 DIABETES MELLITUS (HCC): ICD-10-CM

## 2023-09-11 DIAGNOSIS — E11.22 TYPE 2 DIABETES MELLITUS WITH CHRONIC KIDNEY DISEASE, WITH LONG-TERM CURRENT USE OF INSULIN, UNSPECIFIED CKD STAGE (HCC): Primary | ICD-10-CM

## 2023-09-11 DIAGNOSIS — E78.5 DYSLIPIDEMIA: ICD-10-CM

## 2023-09-11 DIAGNOSIS — Z79.4 TYPE 2 DIABETES MELLITUS WITH CHRONIC KIDNEY DISEASE, WITH LONG-TERM CURRENT USE OF INSULIN, UNSPECIFIED CKD STAGE (HCC): Primary | ICD-10-CM

## 2023-09-11 LAB
CARTRIDGE LOT#: NORMAL NUMERIC
GLUCOSE BLOOD: 174
HEMOGLOBIN A1C: 5.5 % (ref 4.3–5.6)
TEST STRIP LOT #: NORMAL NUMERIC

## 2023-09-11 PROCEDURE — 3008F BODY MASS INDEX DOCD: CPT | Performed by: INTERNAL MEDICINE

## 2023-09-11 PROCEDURE — 83036 HEMOGLOBIN GLYCOSYLATED A1C: CPT | Performed by: INTERNAL MEDICINE

## 2023-09-11 PROCEDURE — 3078F DIAST BP <80 MM HG: CPT | Performed by: INTERNAL MEDICINE

## 2023-09-11 PROCEDURE — 82947 ASSAY GLUCOSE BLOOD QUANT: CPT | Performed by: INTERNAL MEDICINE

## 2023-09-11 PROCEDURE — 99214 OFFICE O/P EST MOD 30 MIN: CPT | Performed by: INTERNAL MEDICINE

## 2023-09-11 PROCEDURE — 3074F SYST BP LT 130 MM HG: CPT | Performed by: INTERNAL MEDICINE

## 2023-09-11 PROCEDURE — 1159F MED LIST DOCD IN RCRD: CPT | Performed by: INTERNAL MEDICINE

## 2023-09-11 PROCEDURE — 1160F RVW MEDS BY RX/DR IN RCRD: CPT | Performed by: INTERNAL MEDICINE

## 2023-09-11 RX ORDER — LINAGLIPTIN 5 MG/1
5 TABLET, FILM COATED ORAL DAILY
Qty: 90 TABLET | Refills: 1 | Status: SHIPPED | OUTPATIENT
Start: 2023-09-11

## 2023-09-11 NOTE — PROGRESS NOTES
Diabetes FU      HISTORY OF PRESENT ILLNESS   Johnie Michelle is a 70year old male who presents for diabetes FU. ESRD on HD now M/ W/F    Diagnosed with diabetes in , started on Metformin and started on insulin in  (was seeen at [facility])     Family hx of diabetes: Grandparents , mother      Dietary compliance: moderate  Exercise: No  Polyuria/polydipsia: Yes  Blurred vision: Yes    Episodes of hypoglycemia: Yes  Blood Glucose:  Fastin-140  Pre lunch  100-140  Pre dinner:120-150    One low Bg 66, states he did not eat well    Medications for DM   Lantus 18-20 nightly   Tradjenta 5 mg daily--> states pharmacy has not filled it recently, is running out      REVIEW OF SYSTEMS    Eyes: Diabetic retinopathy present: No            Most recent visit to eye doctor in last 12 months: Yes, 2022, cataract sx Dec 2021, 2022, last  apt in 2023    Oral/ Dental Care : Recent visit to dentist in last 6-12 months    CV: Cardiovascular disease present: No         Hypertension present: Yes         Hyperlipidemia present: Yes statin          Peripheral Vascular Disease present: No         Heart Failure: No    : Nephropathy present: Yes    Neuro: Neuropathy present: Yes    Skin: Infection or ulceration: No            Follows with Podiatry: Yes    Feet  DeniesHistory of ulceration, amputation, Charcot foot, angioplasty or vascular surgery  Positive for +, neuropathy (pain, burning, numbness) in feet  . Osteoporosis: No    Thyroid disease: No    Celiac disease: No    Psychiatric:    Little interest or pleasure in doing things:No   Feeling down, depressed or hopeless : No      Medications:     Current Outpatient Medications:     linaGLIPtin (TRADJENTA) 5 mg Oral Tab, Take 1 tablet (5 mg total) by mouth daily. , Disp: 90 tablet, Rfl: 1    ciprofloxacin-dexamethasone 0.3-0.1 % Otic Suspension, Place 4 drops into the left ear 2 (two) times daily for 10 days. , Disp: 7.5 mL, Rfl: 0    amoxicillin 500 MG Oral Tab, Take 1 tablet (500 mg total) by mouth 3 (three) times daily for 10 days. , Disp: 30 tablet, Rfl: 0    Glucose Blood (CONTOUR NEXT TEST) In Vitro Strip, Test 3 times daily, Disp: 300 each, Rfl: 1    atorvastatin 40 MG Oral Tab, Take 1 tablet (40 mg total) by mouth nightly., Disp: 90 tablet, Rfl: 3    carvedilol 12.5 MG Oral Tab, Take 1 tablet (12.5 mg total) by mouth 2 (two) times daily with meals. , Disp: 180 tablet, Rfl: 3    felodipine ER 10 MG Oral Tablet 24 Hr, Take 1 tablet (10 mg total) by mouth daily. , Disp: 90 tablet, Rfl: 3    fluticasone propionate 50 MCG/ACT Nasal Suspension, 2 sprays by Nasal route daily. , Disp: 48 g, Rfl: 3    pantoprazole 40 MG Oral Tab EC, Take 1 tablet (40 mg total) by mouth every morning before breakfast., Disp: 90 tablet, Rfl: 3    methocarbamol 500 MG Oral Tab, Take 1 tablet (500 mg total) by mouth 2 (two) times daily as needed. , Disp: 60 tablet, Rfl: 1    LANTUS SOLOSTAR 100 UNIT/ML Subcutaneous Solution Pen-injector, INJECT 16 TO 18 UNITS INTO THE  SKIN AT NIGHT, Disp: 18 mL, Rfl: 0    Insulin Pen Needle (PEN NEEDLES) 32G X 4 MM Does not apply Misc, 1 each daily. , Disp: 100 each, Rfl: 0    albuterol (PROAIR HFA) 108 (90 Base) MCG/ACT Inhalation Aero Soln, Inhale 2 puffs into the lungs every 4 (four) hours as needed for Wheezing., Disp: 3 each, Rfl: 3    Budesonide-Formoterol Fumarate (SYMBICORT) 160-4.5 MCG/ACT Inhalation Aerosol, Inhale 2 puffs into the lungs 2 (two) times daily. , Disp: 3 each, Rfl: 3    Calcium Polycarbophil (FIBERCON) 625 MG Oral Tab, Take 1 tablet (625 mg total) by mouth daily. , Disp: 30 tablet, Rfl: 3    magnesium 250 MG Oral Tab, Take 1 tablet (250 mg total) by mouth daily. , Disp: , Rfl:     Cholecalciferol (VITAMIN D3) 25 MCG (1000 UT) Oral Cap, Take 1 tablet by mouth daily. , Disp: , Rfl:     aspirin 81 MG Oral Tab, Take 1 tablet (81 mg total) by mouth daily. , Disp: , Rfl:     Darbepoetin Randell 60 MCG/ML Injection Solution, Inject into the vein as needed. (Patient not taking: Reported on 2023), Disp: , Rfl:     cycloSPORINE (RESTASIS) 0.05 % Ophthalmic Emulsion, Place 2 drops into both eyes daily.  (Patient not taking: Reported on 2023), Disp: 16.5 mL, Rfl: 3     Allergies:     Adhesive Tape           OTHER (SEE COMMENTS)    Comment:Severe rashes  Dust Mite Extract       RASH    Social History:   Social History    Socioeconomic History      Marital status:     Tobacco Use      Smoking status: Former        Packs/day: 1.00        Years: 17.00        Pack years: 17        Types: Cigarettes        Quit date: 1981        Years since quittin.7      Smokeless tobacco: Never    Vaping Use      Vaping Use: Never used    Substance and Sexual Activity      Alcohol use: No        Alcohol/week: 0.0 standard drinks of alcohol      Drug use: No      Sexual activity: Yes        Partners: Female      Medical History:   Past Medical History:   Diagnosis Date    Anemia     Asthma     Back problem     BPH (benign prostatic hyperplasia)     Calculus of kidney     Cataract     Diabetes ([facility] [address]     ESRD (end stage renal disease) on dialysis ([facility] [address]     Essential hypertension     High blood pressure     High cholesterol     History of blood transfusion     Hyperlipidemia     Neuropathy     hands and feet    KRAIG on CPAP     Renal disorder     Sleep apnea     Visual impairment     glasses    Vocal cord paralysis, unilateral partial        Surgical history:   Past Surgical History:   Procedure Laterality Date    APPENDECTOMY          BACK SURGERY      Neck/back - [address]      CATARACT      2021 and 2022    COLONOSCOPY      COLONOSCOPY N/A 2021    Procedure: COLONOSCOPY;  Surgeon: Nichol Benjamin MD;  Location: [facility] ENDO    HAND/FINGER SURGERY UNLISTED      Accidental trauma    UPPER GI ENDOSCOPY,DIAGNOSIS           PHYSICAL EXAM   23  1035   BP: 112/55   Pulse: 77   Weight: 171 lb (77.6 kg)   Height: 5' 3\" (1.6 m)         General Appearance:  alert, well developed, in no acute distress  Head: Atraumatic  Eyes:  normal conjunctivae, sclera. , normal sclera and normal pupils  Throat/Neck: normal sound to voice. Normal hearing, normal speech  Respiratory:  Speaking in full sentences, non-labored. no increased work of breathing, no audible wheezing    Neuro: motor grossly intact, moving all extremities without difficulty  Psychiatric:  oriented to time, self, and place  Extremities:   Bilateral barefoot skin diabetic exam is normal, visualized feet and the appearance is normal.  Bilateral monofilament/sensation of both feet is  decreased   Pulsation pedal pulse exam of both lower legs/feet is normal as well. Follows with podiatry, has diabetic shoes      Lab Data:    a1c is 5.5 %     ASSESSMENT/PLAN:    1. Diabetes Mellitus Type 2:  With ESRD  Discussed importance of glycemic control and patient verbalized understanding of diabetes pathogenesis and glycemic control to prevent the diabetes complications   -Lifestyle/ Diet/ Exercise reviewed the following   -Discussed importance of SBGM  -Low CHO diet, recommend 45gm per meal or 135gm per day  -Low CHO diet and CHO counting  -Checking BG levels at home      Diabetic Medications     A1 is 5.5 % however, Bg are mostly good  No significant hypoglycemia  He has ESRD that can lower A1c      Lantus  18 Nightly, on Dialysis days take 16 nightly   linagliptin 5 mg daily--> spoke with staff, they will call his pharmacy to check on prescription   Try to eat regular meals, do not miss meals, can snack if does not feel like eating  Continue to check sugars, call if he has low BG under 70    Check before BF and before dinner  Call with BG as discussed    Will like to try sensor, cgm prescribed   Again explained the process , number to set up account with [facility] provided      2. Hypertension / Blood pressure   BP is normal today  Monitor BP at home  FU with PCP and nephrology    3. Hyperlipidemia / Lipids   Reviewed goal lipid/ low fat diet  C/w statin, reports compliance  Discussed the potential side effects of statins including muscle and liver injury. 4. Diabetic neuropathy  Stable on OTC pain relief cream  CPM  Given age and GFR, will avoid oral medications given increased for SE          RTC in 3-4 months  Call with BG as discussed. Patient verbalized a complete  understanding of all of the above and did not have any further questions.            Orders Placed This Encounter      POC HemoCue Glucose 201 (Finger stick glucose)      POC Glycohemoglobin [89995]    Michael Brito MD

## 2023-09-12 ENCOUNTER — TELEPHONE (OUTPATIENT)
Dept: ENDOCRINOLOGY CLINIC | Facility: CLINIC | Age: 78
End: 2023-09-12

## 2023-09-15 ENCOUNTER — OFFICE VISIT (OUTPATIENT)
Dept: OTOLARYNGOLOGY | Facility: CLINIC | Age: 78
End: 2023-09-15

## 2023-09-15 VITALS — BODY MASS INDEX: 34 KG/M2 | WEIGHT: 194 LBS

## 2023-09-15 DIAGNOSIS — H60.62 CHRONIC OTITIS EXTERNA OF LEFT EAR, UNSPECIFIED TYPE: Primary | ICD-10-CM

## 2023-09-15 DIAGNOSIS — H61.892 POLYP OF LEFT EAR CANAL: ICD-10-CM

## 2023-09-15 PROCEDURE — 87075 CULTR BACTERIA EXCEPT BLOOD: CPT | Performed by: STUDENT IN AN ORGANIZED HEALTH CARE EDUCATION/TRAINING PROGRAM

## 2023-09-15 PROCEDURE — 87106 FUNGI IDENTIFICATION YEAST: CPT | Performed by: STUDENT IN AN ORGANIZED HEALTH CARE EDUCATION/TRAINING PROGRAM

## 2023-09-15 PROCEDURE — 87186 SC STD MICRODIL/AGAR DIL: CPT | Performed by: STUDENT IN AN ORGANIZED HEALTH CARE EDUCATION/TRAINING PROGRAM

## 2023-09-15 PROCEDURE — 87205 SMEAR GRAM STAIN: CPT | Performed by: STUDENT IN AN ORGANIZED HEALTH CARE EDUCATION/TRAINING PROGRAM

## 2023-09-15 PROCEDURE — 87070 CULTURE OTHR SPECIMN AEROBIC: CPT | Performed by: STUDENT IN AN ORGANIZED HEALTH CARE EDUCATION/TRAINING PROGRAM

## 2023-09-15 PROCEDURE — 87077 CULTURE AEROBIC IDENTIFY: CPT | Performed by: STUDENT IN AN ORGANIZED HEALTH CARE EDUCATION/TRAINING PROGRAM

## 2023-09-18 ENCOUNTER — TELEPHONE (OUTPATIENT)
Facility: CLINIC | Age: 78
End: 2023-09-18

## 2023-09-23 ENCOUNTER — PATIENT MESSAGE (OUTPATIENT)
Dept: INTERNAL MEDICINE CLINIC | Facility: CLINIC | Age: 78
End: 2023-09-23

## 2023-09-25 ENCOUNTER — OFFICE VISIT (OUTPATIENT)
Dept: INTERNAL MEDICINE CLINIC | Facility: CLINIC | Age: 78
End: 2023-09-25

## 2023-09-25 VITALS
OXYGEN SATURATION: 99 % | HEART RATE: 70 BPM | DIASTOLIC BLOOD PRESSURE: 58 MMHG | HEIGHT: 63 IN | SYSTOLIC BLOOD PRESSURE: 140 MMHG | TEMPERATURE: 98 F | BODY MASS INDEX: 29.59 KG/M2 | WEIGHT: 167 LBS

## 2023-09-25 DIAGNOSIS — I48.0 PAROXYSMAL ATRIAL FIBRILLATION (HCC): Primary | ICD-10-CM

## 2023-09-25 PROCEDURE — 3077F SYST BP >= 140 MM HG: CPT | Performed by: INTERNAL MEDICINE

## 2023-09-25 PROCEDURE — 3078F DIAST BP <80 MM HG: CPT | Performed by: INTERNAL MEDICINE

## 2023-09-25 PROCEDURE — 1160F RVW MEDS BY RX/DR IN RCRD: CPT | Performed by: INTERNAL MEDICINE

## 2023-09-25 PROCEDURE — 3008F BODY MASS INDEX DOCD: CPT | Performed by: INTERNAL MEDICINE

## 2023-09-25 PROCEDURE — 99215 OFFICE O/P EST HI 40 MIN: CPT | Performed by: INTERNAL MEDICINE

## 2023-09-25 PROCEDURE — 1111F DSCHRG MED/CURRENT MED MERGE: CPT | Performed by: INTERNAL MEDICINE

## 2023-09-25 PROCEDURE — 1126F AMNT PAIN NOTED NONE PRSNT: CPT | Performed by: INTERNAL MEDICINE

## 2023-09-25 PROCEDURE — 1159F MED LIST DOCD IN RCRD: CPT | Performed by: INTERNAL MEDICINE

## 2023-09-25 RX ORDER — ACETAMINOPHEN 500 MG
500 TABLET ORAL DAILY PRN
COMMUNITY

## 2023-09-25 RX ORDER — UBIDECARENONE 75 MG
250 CAPSULE ORAL EVERY OTHER DAY
COMMUNITY

## 2023-09-25 RX ORDER — METOPROLOL TARTRATE 50 MG/1
50 TABLET, FILM COATED ORAL 2 TIMES DAILY
Qty: 60 TABLET | Refills: 1 | Status: SHIPPED | OUTPATIENT
Start: 2023-09-25

## 2023-09-25 RX ORDER — TETRAHYDROZOLINE HCL 0.05 %
1 DROPS OPHTHALMIC (EYE) 2 TIMES DAILY PRN
COMMUNITY

## 2023-09-25 RX ORDER — POLYETHYLENE GLYCOL 3350 17 G/17G
POWDER, FOR SOLUTION ORAL
COMMUNITY

## 2023-09-25 RX ORDER — CETIRIZINE HYDROCHLORIDE 10 MG/1
10 TABLET ORAL EVERY OTHER DAY
COMMUNITY

## 2023-09-25 RX ORDER — TRAMADOL HYDROCHLORIDE 50 MG/1
50 TABLET ORAL EVERY 12 HOURS PRN
COMMUNITY

## 2023-09-25 RX ORDER — METOPROLOL TARTRATE 50 MG/1
50 TABLET, FILM COATED ORAL 2 TIMES DAILY
COMMUNITY

## 2023-09-25 NOTE — TELEPHONE ENCOUNTER
Called and s/w spouse, Odessa Held (HIPAA verified). Pt was discharged from the hospital yesterday. She confirmed appointment for today.

## 2023-09-25 NOTE — TELEPHONE ENCOUNTER
Message was not seen until now. Can receive the patient was discharged?   Has appointment with me today 9/25

## 2023-09-25 NOTE — PATIENT INSTRUCTIONS
You were seen in clinic today for posthospitalization follow-up. Today, we did focus on your recent hospitalization with concern of newly diagnosed atrial fibrillation. This an abnormal heart rhythm that can cause high heart rates and may increase risk of stroke if blood clot forms within the heart. We recommended:  - We will request the records from the other hospital  - Agree with stopping carvedilol in favor of metoprolol tartrate 50 mg twice a day. We will provide a short-term 30-day supply prior to establishing with Dr. Joseph Kimball of electrophysiology  - Continue with Eliquis which is the blood thinning medication to reduce risk for stroke  - Monitor for any chest pain, palpitations, worsening shortness of breath which may be a flareup of A-fib. He will need to follow-up first with Dr. Joseph Kimball of electrophysiology. It may be good to also see Dr. Roxana Thurman as scheduled for general cardiology evaluation. We discussed that it is okay to continue Jacque Round as this has been a medication you have been taking even before dialysis.     Continue with regularly scheduled hemodialysis with Dr. Annita Han    We will follow-up on the recommendations provided by your ENT visit with Dr. Rupert Juan tomorrow    Return to clinic in 3 months for follow-up

## 2023-09-26 ENCOUNTER — OFFICE VISIT (OUTPATIENT)
Dept: OTOLARYNGOLOGY | Facility: CLINIC | Age: 78
End: 2023-09-26

## 2023-09-26 ENCOUNTER — TELEPHONE (OUTPATIENT)
Dept: OTOLARYNGOLOGY | Facility: CLINIC | Age: 78
End: 2023-09-26

## 2023-09-26 DIAGNOSIS — H66.3X2 CHRONIC SUPPURATIVE OTITIS MEDIA OF LEFT EAR, UNSPECIFIED OTITIS MEDIA LOCATION: ICD-10-CM

## 2023-09-26 DIAGNOSIS — H60.312 ACUTE DIFFUSE OTITIS EXTERNA OF LEFT EAR: Primary | ICD-10-CM

## 2023-09-26 PROCEDURE — 1160F RVW MEDS BY RX/DR IN RCRD: CPT | Performed by: STUDENT IN AN ORGANIZED HEALTH CARE EDUCATION/TRAINING PROGRAM

## 2023-09-26 PROCEDURE — 69210 REMOVE IMPACTED EAR WAX UNI: CPT | Performed by: STUDENT IN AN ORGANIZED HEALTH CARE EDUCATION/TRAINING PROGRAM

## 2023-09-26 PROCEDURE — 99215 OFFICE O/P EST HI 40 MIN: CPT | Performed by: STUDENT IN AN ORGANIZED HEALTH CARE EDUCATION/TRAINING PROGRAM

## 2023-09-26 PROCEDURE — 1159F MED LIST DOCD IN RCRD: CPT | Performed by: STUDENT IN AN ORGANIZED HEALTH CARE EDUCATION/TRAINING PROGRAM

## 2023-09-26 RX ORDER — DOXYCYCLINE HYCLATE 100 MG/1
100 CAPSULE ORAL 2 TIMES DAILY
Qty: 20 CAPSULE | Refills: 0 | Status: SHIPPED | OUTPATIENT
Start: 2023-09-26 | End: 2023-10-06

## 2023-09-26 RX ORDER — FLUCONAZOLE 100 MG/1
100 TABLET ORAL 2 TIMES DAILY
Qty: 20 TABLET | Refills: 0 | Status: SHIPPED | OUTPATIENT
Start: 2023-09-26 | End: 2023-09-26

## 2023-09-26 NOTE — TELEPHONE ENCOUNTER
Called and spoke to [facility] pharmacist, informed her to cancel the Fluconazole 100 MG Oral Tab due to interactions with 4 of his medications. Called patient, informed him of above. Patient understanding.

## 2023-09-26 NOTE — PROGRESS NOTES
Dee Santana is a 70year old male. Patient presents with: Follow - Up: Pt here to check his ear. ASSESSMENT AND PLAN:   1. Acute diffuse otitis externa of left ear  [id number] with a complicated medical history including dialysis, heart failure, cardiovascular disease, diabetes with a chronic draining left ear for more than 6 months. He has been on multiple medical therapies. I instilled gentian violet into his ear which did help with the drainage at the last visit. I did take cultures which grew the below. He states he was recently hospitalized due to cardiac related issues. Reviewed PCP note was told that he had A-fib which is a new diagnosis. On exam the left ear canal drainage is actually more clear although there is an obvious bulging and pulsation of the tympanic membrane with erythema and inflammation. Appears to be a small pinpoint perforation that this is draining through. Also some polypoid material in the medial ear canal.    At this point we will obtain a CT temporal bone to look for underlying cholesteatoma. Also will refer to infectious disease given his lack of oral options on the cultures with the resistance. He may need IV antibiotics. Will prescribe pain oral Diflucan and doxycycline in the meantime as he is culture did grow Candida and doxycycline was not tested although he may be sensitive to this. Also discussed with his primary care provider.     MDM  -Severe progression of chronic issue  -Discussion of care with primary care provider  -Review of lab culture, order of CT scan and review of PCP documentation  -Decision regarding IV antibiotic therapy    AEROBIC CULTURE RESULT 2+ growth Escherichia coli Abnormal       2+ growth Candida parapsilosis Abnormal                AEROBIC SMEAR 1+ Gram Positive Rods      No WBCs seen              Resulting Agency: [facility] ([facility])     Susceptibility     Escherichia coli     Not Specified    Ampicillin 16 Intermediate Cefazolin <=4 Sensitive    Ciprofloxacin >=4 Resistant    Gentamicin <=1 Sensitive    Levofloxacin >=8 Resistant    Meropenem <=0.25 Sensitive    Piperacillin + Tazobactam <=4 Sensitive    Trimethoprim/Sulfa >=320 Resistant                   2. Chronic suppurative otitis media of left ear, unspecified otitis media location    - CT TEMPORAL BONES (CPT=70480); Future  - Infectious Disease Referral - In Network      The patient indicates understanding of these issues and agrees to the plan. EXAM:   There were no vitals taken for this visit. Pertinent exam findings may also be noted above in assessment and plan     System Details   Skin Inspection - Normal.   Constitutional Overall appearance - Normal.   Head/Face Symmetric, TMJ tenderness not present    Eyes EOMI, PERRL   Right ear:  Canal clear, TM intact, no AMIE   Left ear:  Canal clear, TM intact, no AMIE   Nose: Septum midline, inferior turbinates not enlarged, nasal valves without collapse    Oral cavity/Oropharynx: No lesions or masses on inspection or palpation, tonsils symmetric    Neck: Soft without LAD, thyroid not enlarged  Voice clear/ no stridor   Other:      Scopes and Procedures:     Canals:  Left: Canal with cerumen preventing adequate view of TM, debrided with instrumentation    Tympanic Membranes:  Left: Normal tympanic membrane. TM Visualized Method:   Left TM examined via otomicroscopy. PROCEDURE:   Removal of cerumen impaction   The cerumen impaction was completely removed on the left side using microscopy as necessary. Removal was completed by using a curette and suction. Current Outpatient Medications   Medication Sig Dispense Refill    doxycycline 100 MG Oral Cap Take 1 capsule (100 mg total) by mouth 2 (two) times daily for 10 days. 20 capsule 0    fluconazole 100 MG Oral Tab Take 1 tablet (100 mg total) by mouth in the morning and 1 tablet (100 mg total) before bedtime. Do all this for 10 days.  20 tablet 0 apixaban 5 MG Oral Tab Take 1 tablet (5 mg total) by mouth 2 (two) times daily. metoprolol tartrate 50 MG Oral Tab Take 1 tablet (50 mg total) by mouth 2 (two) times daily. acetaminophen 500 MG Oral Tab Take 1 tablet (500 mg total) by mouth daily as needed for Pain. cetirizine 10 MG Oral Tab Take 1 tablet (10 mg total) by mouth every other day. Cholecalciferol 125 MCG (5000 UT) Oral Tab Take 1 tablet (5,000 Units total) by mouth 2 (two) times daily. cyanocobalamin 100 MCG Oral Tab Take 2.5 tablets (250 mcg total) by mouth every other day. polyethylene glycol, PEG 3350, 17 g Oral Powd Pack 17 g Wed, Thurs, Sat      psyllium 28 % Oral Powd Pack Mon-Tue-Fri      tetrahydrozoline 0.05 % Ophthalmic Solution 1 drop 2 (two) times daily as needed. traMADol 50 MG Oral Tab Take 1 tablet (50 mg total) by mouth every 12 (twelve) hours as needed for Pain.      metoprolol tartrate 50 MG Oral Tab Take 1 tablet (50 mg total) by mouth 2 (two) times daily. 60 tablet 1    linaGLIPtin (TRADJENTA) 5 mg Oral Tab Take 1 tablet (5 mg total) by mouth daily. 90 tablet 1    Glucose Blood (CONTOUR NEXT TEST) In Vitro Strip Test 3 times daily 300 each 1    atorvastatin 40 MG Oral Tab Take 1 tablet (40 mg total) by mouth nightly. 90 tablet 3    felodipine ER 10 MG Oral Tablet 24 Hr Take 1 tablet (10 mg total) by mouth daily. 90 tablet 3    fluticasone propionate 50 MCG/ACT Nasal Suspension 2 sprays by Nasal route daily. 48 g 3    pantoprazole 40 MG Oral Tab EC Take 1 tablet (40 mg total) by mouth every morning before breakfast. 90 tablet 3    methocarbamol 500 MG Oral Tab Take 1 tablet (500 mg total) by mouth 2 (two) times daily as needed.  60 tablet 1    LANTUS SOLOSTAR 100 UNIT/ML Subcutaneous Solution Pen-injector INJECT 16 TO 18 UNITS INTO THE  SKIN AT NIGHT (Patient taking differently: Inject 16-20 Units into the skin nightly.) 18 mL 0    Insulin Pen Needle (PEN NEEDLES) 32G X 4 MM Does not apply Misc 1 each daily. 100 each 0    albuterol (PROAIR HFA) 108 (90 Base) MCG/ACT Inhalation Aero Soln Inhale 2 puffs into the lungs every 4 (four) hours as needed for Wheezing. 3 each 3    Budesonide-Formoterol Fumarate (SYMBICORT) 160-4.5 MCG/ACT Inhalation Aerosol Inhale 2 puffs into the lungs 2 (two) times daily. (Patient taking differently: Inhale 2 puffs into the lungs 2 (two) times daily as needed.) 3 each 3    aspirin 81 MG Oral Tab Take 1 tablet (81 mg total) by mouth daily. Darbepoetin Randell 60 MCG/ML Injection Solution Inject into the vein as needed.    (Patient not taking: Reported on 2023)        Past Medical History:   Diagnosis Date    Anemia     Asthma     Back problem     BPH (benign prostatic hyperplasia)     Calculus of kidney     Cataract     Diabetes ([facility] [address]     ESRD (end stage renal disease) on dialysis ([facility] [address]     Essential hypertension     High blood pressure     High cholesterol     History of blood transfusion     Hyperlipidemia     Neuropathy     hands and feet    KRAIG on CPAP     Renal disorder     Sleep apnea     Visual impairment     glasses    Vocal cord paralysis, unilateral partial       Social History:  Social History     Socioeconomic History    Marital status:    Tobacco Use    Smoking status: Former     Packs/day: 1.00     Years: 17.00     Additional pack years: 0.00     Total pack years: 17.00     Types: Cigarettes     Quit date: 1981     Years since quittin.7    Smokeless tobacco: Never   Vaping Use    Vaping Use: Never used   Substance and Sexual Activity    Alcohol use: No     Alcohol/week: 0.0 standard drinks of alcohol    Drug use: No    Sexual activity: Yes     Partners: Female   Social Determinants of Health  Financial Resource Strain: Low Risk  (2023)      Financial Resource Strain          Difficulty of Paying Living Expenses: Not hard at all          Med Affordability: No  Transportation Needs: No Transportation Needs (2023)      Transportation Needs Lack of Transportation: Lili Rico MD  9/26/2023  10:54 AM

## 2023-09-26 NOTE — TELEPHONE ENCOUNTER
The following medication is going to interact with 4 different medications the pt is currently taking can cause rhabdomyolysis please advise     fluconazole 100 MG Oral Tab

## 2023-09-26 NOTE — TELEPHONE ENCOUNTER
Dr. Shaila Becker, please see note from patient's pharmacy regarding the Fluconazole prescribed today and advise. Thank you Helical Rim Advancement Flap Text: The defect edges were debeveled with a #15 blade scalpel.  Given the location of the defect and the proximity to free margins (helical rim) a double helical rim advancement flap was deemed most appropriate.  Using a sterile surgical marker, the appropriate advancement flaps were drawn incorporating the defect and placing the expected incisions between the helical rim and antihelix where possible.  The area thus outlined was incised through and through with a #15 scalpel blade.  With a skin hook and iris scissors, the flaps were gently and sharply undermined and freed up.

## 2023-10-07 ENCOUNTER — HOSPITAL ENCOUNTER (OUTPATIENT)
Dept: CT IMAGING | Facility: HOSPITAL | Age: 78
Discharge: HOME | End: 2023-10-07
Attending: STUDENT IN AN ORGANIZED HEALTH CARE EDUCATION/TRAINING PROGRAM
Payer: MEDICARE

## 2023-10-07 DIAGNOSIS — H66.3X2 CHRONIC SUPPURATIVE OTITIS MEDIA OF LEFT EAR, UNSPECIFIED OTITIS MEDIA LOCATION: ICD-10-CM

## 2023-10-07 PROCEDURE — 70480 CT ORBIT/EAR/FOSSA W/O DYE: CPT | Performed by: STUDENT IN AN ORGANIZED HEALTH CARE EDUCATION/TRAINING PROGRAM

## 2023-10-10 ENCOUNTER — TELEPHONE (OUTPATIENT)
Dept: OTOLARYNGOLOGY | Facility: CLINIC | Age: 78
End: 2023-10-10

## 2023-10-10 NOTE — TELEPHONE ENCOUNTER
Called and spoke to patient. He has not had a chance to call and schedule an appointment for infectious disease. He said he has so much going on. He saw his hear doctor today. He will try to call tomorrow. Urged patient to call as soon as possible. Dr. Hany Melendez, please review and advise. Thank you.

## 2023-10-10 NOTE — TELEPHONE ENCOUNTER
Return call to patient, [facility], Dr. Kevin Castaneda, [phone].

## 2023-10-16 ENCOUNTER — TELEPHONE (OUTPATIENT)
Dept: INTERNAL MEDICINE CLINIC | Facility: CLINIC | Age: 78
End: 2023-10-16

## 2023-10-16 NOTE — TELEPHONE ENCOUNTER
Okay to try no more than the lowest dose once a day. We have to be careful as it does contain a supplement that has magnesium and phosphorus in it. Should only use during times of muscle cramps and not consistently on a daily basis.

## 2023-10-16 NOTE — TELEPHONE ENCOUNTER
Patient is calling to ask Dr Nolan Mckeon if it is okay to start taking OTC Hylands for leg cramps.     Please call patient [phone]

## 2023-10-16 NOTE — TELEPHONE ENCOUNTER
Spoke to patients wife okay per HIPAA and advised per MD message below, spouse verbalized understanding and agreed to deliver message, advised for  to call back if he has any questions or needs message to be repeated.

## 2023-10-31 ENCOUNTER — TELEPHONE (OUTPATIENT)
Dept: OTOLARYNGOLOGY | Facility: CLINIC | Age: 78
End: 2023-10-31

## 2023-10-31 ENCOUNTER — TELEPHONE (OUTPATIENT)
Dept: ENDOCRINOLOGY CLINIC | Facility: CLINIC | Age: 78
End: 2023-10-31

## 2023-10-31 NOTE — TELEPHONE ENCOUNTER
Discussed with infectious disease physician, Dr Mark García. Difficult culture pattern with E. coli and fungus. He is going to try IV fungal and antibacterial therapy probably for at least 4 to 6 weeks. Patient will need to come several times a week to his office for the injection. If there are any issues during the therapy I can see him sooner. If not I will see him at the end of therapy for evaluation. If fails this may more strongly consider surgical therapy. Currently surgical therapy with consider anesthesia risk given his multiple comorbidities including dialysis.

## 2023-10-31 NOTE — TELEPHONE ENCOUNTER
Spoke with Edwin Hammer at [address] [place] [address] they will call RN station and will be transferred to Dr. Kirstin Galeas.

## 2023-10-31 NOTE — TELEPHONE ENCOUNTER
Per Riya Bottom Dr Juan Qureshi is asking to speak to Dr. Candelaria Mi, did not want to page, non-urgent.  Please advise

## 2023-11-16 ENCOUNTER — NURSE ONLY (OUTPATIENT)
Dept: ENDOCRINOLOGY CLINIC | Facility: CLINIC | Age: 78
End: 2023-11-16

## 2023-11-16 DIAGNOSIS — E11.22 TYPE 2 DIABETES MELLITUS WITH CHRONIC KIDNEY DISEASE, WITH LONG-TERM CURRENT USE OF INSULIN, UNSPECIFIED CKD STAGE (HCC): Primary | ICD-10-CM

## 2023-11-16 DIAGNOSIS — Z79.4 TYPE 2 DIABETES MELLITUS WITH CHRONIC KIDNEY DISEASE, WITH LONG-TERM CURRENT USE OF INSULIN, UNSPECIFIED CKD STAGE (HCC): Primary | ICD-10-CM

## 2023-11-16 NOTE — PROGRESS NOTES
Patient came in for [employer] application. Patient was to bring in [employer] supplies however brought in [address] [place] [address] Rd 14 guardian 3 sensors. Patient stated that [employer] had sent him Guardian 3 sensors. RN found original order from September requesting [place] 3 cgm supplies - not [employer] sensors. Since patient was here at the clinic I started him on Dexcom G7. [place] device given to patient. RN walked patient and spouse through on how to apply sensor and activate sensor using reader karl. Patient was told to remove and apply new sensor every 10 days. I reached out to our [employer] representative for assistance on correcting and changing order to get patient the Dexcom G7 moving forward. Patient was advised that we will follow up with him once we hear back from [facility] regarding order. I told patient to hold on to [employer] supplies sent in error.

## 2023-11-20 ENCOUNTER — HOSPITAL ENCOUNTER (EMERGENCY)
Facility: HOSPITAL | Age: 78
Discharge: HOME | End: 2023-11-20
Attending: EMERGENCY MEDICINE
Payer: MEDICARE

## 2023-11-20 VITALS
OXYGEN SATURATION: 96 % | DIASTOLIC BLOOD PRESSURE: 73 MMHG | SYSTOLIC BLOOD PRESSURE: 130 MMHG | HEART RATE: 76 BPM | TEMPERATURE: 98 F | RESPIRATION RATE: 17 BRPM

## 2023-11-20 DIAGNOSIS — Z99.2 ARTERIOVENOUS FISTULA FOR HEMODIALYSIS IN PLACE, PRIMARY (HCC): Primary | ICD-10-CM

## 2023-11-20 DIAGNOSIS — R58 HEMORRHAGE: ICD-10-CM

## 2023-11-20 LAB
ANION GAP SERPL CALC-SCNC: 7 MMOL/L (ref 0–18)
BASOPHILS # BLD AUTO: 0.05 X10(3) UL (ref 0–0.2)
BASOPHILS NFR BLD AUTO: 0.8 %
BUN BLD-MCNC: 39 MG/DL (ref 9–23)
BUN/CREAT SERPL: 6.2 (ref 10–20)
CALCIUM BLD-MCNC: 9.9 MG/DL (ref 8.7–10.4)
CHLORIDE SERPL-SCNC: 100 MMOL/L (ref 98–112)
CO2 SERPL-SCNC: 32 MMOL/L (ref 21–32)
CREAT BLD-MCNC: 6.3 MG/DL
DEPRECATED RDW RBC AUTO: 59.3 FL (ref 35.1–46.3)
EGFRCR SERPLBLD CKD-EPI 2021: 9 ML/MIN/1.73M2 (ref 60–?)
EOSINOPHIL # BLD AUTO: 0.29 X10(3) UL (ref 0–0.7)
EOSINOPHIL NFR BLD AUTO: 4.6 %
ERYTHROCYTE [DISTWIDTH] IN BLOOD BY AUTOMATED COUNT: 17.6 % (ref 11–15)
GLUCOSE BLD-MCNC: 102 MG/DL (ref 70–99)
HCT VFR BLD AUTO: 34 %
HGB BLD-MCNC: 11.1 G/DL
IMM GRANULOCYTES # BLD AUTO: 0.03 X10(3) UL (ref 0–1)
IMM GRANULOCYTES NFR BLD: 0.5 %
LYMPHOCYTES # BLD AUTO: 1.53 X10(3) UL (ref 1–4)
LYMPHOCYTES NFR BLD AUTO: 24.1 %
MAGNESIUM SERPL-MCNC: 2.1 MG/DL (ref 1.6–2.6)
MCH RBC QN AUTO: 30.3 PG (ref 26–34)
MCHC RBC AUTO-ENTMCNC: 32.6 G/DL (ref 31–37)
MCV RBC AUTO: 92.9 FL
MONOCYTES # BLD AUTO: 0.57 X10(3) UL (ref 0.1–1)
MONOCYTES NFR BLD AUTO: 9 %
NEUTROPHILS # BLD AUTO: 3.89 X10 (3) UL (ref 1.5–7.7)
NEUTROPHILS # BLD AUTO: 3.89 X10(3) UL (ref 1.5–7.7)
NEUTROPHILS NFR BLD AUTO: 61 %
OSMOLALITY SERPL CALC.SUM OF ELEC: 298 MOSM/KG (ref 275–295)
PLATELET # BLD AUTO: 196 10(3)UL (ref 150–450)
POTASSIUM SERPL-SCNC: 3.8 MMOL/L (ref 3.5–5.1)
RBC # BLD AUTO: 3.66 X10(6)UL
SODIUM SERPL-SCNC: 139 MMOL/L (ref 136–145)
WBC # BLD AUTO: 6.4 X10(3) UL (ref 4–11)

## 2023-11-20 PROCEDURE — 85025 COMPLETE CBC W/AUTO DIFF WBC: CPT | Performed by: EMERGENCY MEDICINE

## 2023-11-20 PROCEDURE — 36415 COLL VENOUS BLD VENIPUNCTURE: CPT

## 2023-11-20 PROCEDURE — 99284 EMERGENCY DEPT VISIT MOD MDM: CPT

## 2023-11-20 PROCEDURE — 99283 EMERGENCY DEPT VISIT LOW MDM: CPT

## 2023-11-20 PROCEDURE — 83735 ASSAY OF MAGNESIUM: CPT | Performed by: EMERGENCY MEDICINE

## 2023-11-20 PROCEDURE — 80048 BASIC METABOLIC PNL TOTAL CA: CPT | Performed by: EMERGENCY MEDICINE

## 2023-11-21 NOTE — ED QUICK NOTES
Patient received from triage with pressure being applied to right upper arm by RN. Patient just finished dialysis and has uncontrolled bleeding from fistula. Large trauma ABD gauze applied with coban wrapped. Dr. Rick Escobedo to bedside. MD requesting that dressing remain in place for a while before re-evaluating.

## 2023-11-21 NOTE — ED INITIAL ASSESSMENT (HPI)
Pt bleeding from right fistula for the past hour. Per dialysis nurse, bleeding is spurting without direct pressure. Pt is feeling dizzy and lightheaded.

## 2023-11-27 ENCOUNTER — MED REC SCAN ONLY (OUTPATIENT)
Dept: ENDOCRINOLOGY CLINIC | Facility: CLINIC | Age: 78
End: 2023-11-27

## 2023-11-27 DIAGNOSIS — N18.6 TYPE 2 DIABETES MELLITUS WITH ESRD (END-STAGE RENAL DISEASE) (HCC): ICD-10-CM

## 2023-11-27 DIAGNOSIS — E11.22 TYPE 2 DIABETES MELLITUS WITH ESRD (END-STAGE RENAL DISEASE) (HCC): ICD-10-CM

## 2023-11-28 ENCOUNTER — PATIENT OUTREACH (OUTPATIENT)
Dept: CASE MANAGEMENT | Age: 78
End: 2023-11-28

## 2023-11-28 ENCOUNTER — TELEPHONE (OUTPATIENT)
Dept: ENDOCRINOLOGY CLINIC | Facility: CLINIC | Age: 78
End: 2023-11-28

## 2023-11-28 NOTE — PATIENT INSTRUCTIONS
You were seen in clinic today for post ER follow-up. We are happy to hear that the bleeding has stopped at the fistula site. Monitor for Recurrences  - Continue with your hemodialysis as scheduled    We will request the records from infectious disease regarding the management of your chronic ear infection. This may require multiple weeks of IV antibiotics  - Continue following with Dr. Beatrice Constantino  - Continue with infectious disease. You are likely experiencing medical fatigue and anxiety/stress with multiple treatments, hemodialysis, with doctors appointments  - This is likely manifesting with sleep talking, depressed/anxious mood  - We recommend:     Lexapro 5 mg once a day. We will start a low-dose once a day. May take 3-6 weeks to take full effect. Monitor for any side effects such as upset stomach, diarrhea, dizziness, difficulties with sleep. This may occur the first few days while starting the medication. Should send us a [employer] message in 3-4 weeks for condition update and any potential side effects. It seems the A-fib is under good control, with the same medications recommended by Dr. Bita Fuast  -We are on carvedilol 25 mg twice a day    Continue checking your blood pressures at home    Continue checking your blood sugars at home    We did request durable medical equipment for a new walker to be delivered to your home. Please make an appointment with Dr. Ashley Melgar for management of your sleep apnea. Return to clinic in 3 months for follow-up.

## 2023-11-28 NOTE — TELEPHONE ENCOUNTER
Romi Combs states patient is suppose to  sensors and wanted to know if RN will leave it at the  to be picked up today or tomorrow. Please call - ok to leave detailed message. Thank you. [Dear  ___] : Dear  [unfilled], [Courtesy Letter:] : I had the pleasure of seeing your patient, [unfilled], in my office today. [Please see my note below.] : Please see my note below. [Sincerely,] : Sincerely, [DrRupal  ___] : Dr. ANGELO [FreeTextEntry3] : Harper Oconnor MD

## 2023-11-28 NOTE — TELEPHONE ENCOUNTER
Spoke to patient's spouse Kelton Munguia. Patient will  Dexcom G7 sample sensor tomorrow - advised for patient to contact [facility] to let them know that patient will be switching to Dexcom G7 for next shipment date of 12/11. Patient stated she has the contact number per my previous message.

## 2023-11-29 RX ORDER — INSULIN GLARGINE 100 [IU]/ML
INJECTION, SOLUTION SUBCUTANEOUS NIGHTLY
Qty: 15 ML | Refills: 1 | Status: SHIPPED | OUTPATIENT
Start: 2023-11-29

## 2023-11-29 NOTE — TELEPHONE ENCOUNTER
LOV: 9/11/23 - seen by Dr. Gisele Clements    RTC 3-4 months    Appointment reminder sent via [employer].

## 2023-11-30 ENCOUNTER — OFFICE VISIT (OUTPATIENT)
Dept: INTERNAL MEDICINE CLINIC | Facility: CLINIC | Age: 78
End: 2023-11-30

## 2023-11-30 VITALS
DIASTOLIC BLOOD PRESSURE: 50 MMHG | HEIGHT: 63 IN | SYSTOLIC BLOOD PRESSURE: 124 MMHG | BODY MASS INDEX: 29.41 KG/M2 | OXYGEN SATURATION: 95 % | WEIGHT: 166 LBS | HEART RATE: 75 BPM | TEMPERATURE: 98 F

## 2023-11-30 DIAGNOSIS — I10 ESSENTIAL HYPERTENSION: ICD-10-CM

## 2023-11-30 DIAGNOSIS — M54.2 CHRONIC NECK PAIN: ICD-10-CM

## 2023-11-30 DIAGNOSIS — E78.2 MIXED HYPERLIPIDEMIA: ICD-10-CM

## 2023-11-30 DIAGNOSIS — I48.0 PAROXYSMAL ATRIAL FIBRILLATION (HCC): Primary | ICD-10-CM

## 2023-11-30 DIAGNOSIS — Z79.4 TYPE 2 DIABETES MELLITUS WITH DIABETIC NEPHROPATHY, WITH LONG-TERM CURRENT USE OF INSULIN (HCC): ICD-10-CM

## 2023-11-30 DIAGNOSIS — N18.5 ANEMIA OF CHRONIC RENAL FAILURE, STAGE 5: ICD-10-CM

## 2023-11-30 DIAGNOSIS — M54.12 CERVICAL RADICULOPATHY: ICD-10-CM

## 2023-11-30 DIAGNOSIS — G47.33 OSA ON CPAP: ICD-10-CM

## 2023-11-30 DIAGNOSIS — R25.2 MUSCLE CRAMPING: ICD-10-CM

## 2023-11-30 DIAGNOSIS — E11.21 TYPE 2 DIABETES MELLITUS WITH DIABETIC NEPHROPATHY, WITH LONG-TERM CURRENT USE OF INSULIN (HCC): ICD-10-CM

## 2023-11-30 DIAGNOSIS — D63.1 ANEMIA OF CHRONIC RENAL FAILURE, STAGE 5: ICD-10-CM

## 2023-11-30 DIAGNOSIS — I50.32 CHRONIC DIASTOLIC CONGESTIVE HEART FAILURE (HCC): ICD-10-CM

## 2023-11-30 DIAGNOSIS — G89.29 CHRONIC NECK PAIN: ICD-10-CM

## 2023-11-30 DIAGNOSIS — R26.89 IMBALANCE: ICD-10-CM

## 2023-11-30 DIAGNOSIS — H60.319 CHRONIC DIFFUSE OTITIS EXTERNA, UNSPECIFIED LATERALITY: ICD-10-CM

## 2023-11-30 PROCEDURE — 1160F RVW MEDS BY RX/DR IN RCRD: CPT | Performed by: INTERNAL MEDICINE

## 2023-11-30 PROCEDURE — 99215 OFFICE O/P EST HI 40 MIN: CPT | Performed by: INTERNAL MEDICINE

## 2023-11-30 PROCEDURE — 1159F MED LIST DOCD IN RCRD: CPT | Performed by: INTERNAL MEDICINE

## 2023-11-30 PROCEDURE — 3008F BODY MASS INDEX DOCD: CPT | Performed by: INTERNAL MEDICINE

## 2023-11-30 PROCEDURE — 1125F AMNT PAIN NOTED PAIN PRSNT: CPT | Performed by: INTERNAL MEDICINE

## 2023-11-30 PROCEDURE — 3078F DIAST BP <80 MM HG: CPT | Performed by: INTERNAL MEDICINE

## 2023-11-30 PROCEDURE — 3074F SYST BP LT 130 MM HG: CPT | Performed by: INTERNAL MEDICINE

## 2023-11-30 RX ORDER — ATORVASTATIN CALCIUM 40 MG/1
40 TABLET, FILM COATED ORAL NIGHTLY
Qty: 90 TABLET | Refills: 3 | Status: SHIPPED | OUTPATIENT
Start: 2023-11-30

## 2023-11-30 RX ORDER — CARVEDILOL 25 MG/1
25 TABLET ORAL 2 TIMES DAILY
COMMUNITY
Start: 2023-10-09 | End: 2024-10-08

## 2023-11-30 RX ORDER — ESCITALOPRAM OXALATE 5 MG/1
5 TABLET ORAL DAILY
Qty: 90 TABLET | Refills: 0 | Status: SHIPPED | OUTPATIENT
Start: 2023-11-30

## 2023-12-02 DIAGNOSIS — R09.89 RESPIRATORY CRACKLES AT RIGHT LUNG BASE: ICD-10-CM

## 2023-12-02 DIAGNOSIS — R05.9 COUGH: ICD-10-CM

## 2023-12-02 DIAGNOSIS — R50.81 FEVER IN OTHER DISEASES: ICD-10-CM

## 2023-12-04 ENCOUNTER — TELEPHONE (OUTPATIENT)
Dept: INTERNAL MEDICINE CLINIC | Facility: CLINIC | Age: 78
End: 2023-12-04

## 2023-12-04 DIAGNOSIS — R29.6 RECURRENT FALLS: Primary | ICD-10-CM

## 2023-12-04 DIAGNOSIS — R42 DYSEQUILIBRIUM: ICD-10-CM

## 2023-12-04 NOTE — TELEPHONE ENCOUNTER
Faxed DME External Referral Summary: Need for new walker, foldable 2 wheeled and 2 pronged to [phone] successfully today.

## 2023-12-04 NOTE — TELEPHONE ENCOUNTER
Please fax over new order, supposed to be:    Comments: Need for new walker, foldable 2 wheeled and 2 pronged     In my outbox

## 2023-12-04 NOTE — TELEPHONE ENCOUNTER
To  - there is an order in system but not mention what it is for -need new order with the name of equipment needed faxed to [phone]

## 2023-12-04 NOTE — TELEPHONE ENCOUNTER
Daiana Garcia from [facility] called. She received an order for this pt. On Friday 12/1/23. The order does not have any request for anything. No orders, no items, nothing. Please fax corrected orders to her at [phone].

## 2023-12-04 NOTE — TELEPHONE ENCOUNTER
Mychart sent to patient:  Irina Zee,  We received a refill request from [facility] home delivery for Albuterol inhalers. According to our records, this medication is not due for a refill yet. Are you in need of a refill of this medication? Please let us know! Thank you!   Tanner Gr RN      Last refill sent 3/9/23 for 3 inhalers with 3 refills  [facility] does not show dispenses  Mychart sent to patient to clarify if he needs refill

## 2023-12-06 RX ORDER — ALBUTEROL SULFATE 90 UG/1
2 AEROSOL, METERED RESPIRATORY (INHALATION) EVERY 4 HOURS PRN
Qty: 51 G | Refills: 3 | OUTPATIENT
Start: 2023-12-06

## 2023-12-06 NOTE — TELEPHONE ENCOUNTER
1 yr ordered 3/9/23      Refill request has failed the Ambulatory Medication Refill Standing Order and is routed to the primary physician to review the following:    Requested Prescriptions     Refused Prescriptions Disp Refills    ALBUTEROL 108 (90 Base) MCG/ACT Inhalation Aero Soln [Pharmacy Med Name: ALBUTEROL HFA 90MCG/ACT (PA)] 51 g 3     Sig: USE 2 INHALATIONS BY MOUTH EVERY 4 HOURS AS NEEDED FOR WHEEZING

## 2024-01-09 ENCOUNTER — OFFICE VISIT (OUTPATIENT)
Dept: OTOLARYNGOLOGY | Facility: CLINIC | Age: 79
End: 2024-01-09

## 2024-01-09 VITALS — HEIGHT: 63 IN | BODY MASS INDEX: 29.41 KG/M2 | WEIGHT: 166 LBS

## 2024-01-09 DIAGNOSIS — H60.62 CHRONIC OTITIS EXTERNA OF LEFT EAR, UNSPECIFIED TYPE: Primary | ICD-10-CM

## 2024-01-09 PROCEDURE — 1160F RVW MEDS BY RX/DR IN RCRD: CPT | Performed by: STUDENT IN AN ORGANIZED HEALTH CARE EDUCATION/TRAINING PROGRAM

## 2024-01-09 PROCEDURE — 99213 OFFICE O/P EST LOW 20 MIN: CPT | Performed by: STUDENT IN AN ORGANIZED HEALTH CARE EDUCATION/TRAINING PROGRAM

## 2024-01-09 PROCEDURE — 1159F MED LIST DOCD IN RCRD: CPT | Performed by: STUDENT IN AN ORGANIZED HEALTH CARE EDUCATION/TRAINING PROGRAM

## 2024-01-09 PROCEDURE — 3008F BODY MASS INDEX DOCD: CPT | Performed by: STUDENT IN AN ORGANIZED HEALTH CARE EDUCATION/TRAINING PROGRAM

## 2024-01-09 PROCEDURE — 69210 REMOVE IMPACTED EAR WAX UNI: CPT | Performed by: STUDENT IN AN ORGANIZED HEALTH CARE EDUCATION/TRAINING PROGRAM

## 2024-01-09 NOTE — PROGRESS NOTES
Ollie Hernández is a 78 year old male.   Chief Complaint   Patient presents with    Follow - Up     F/up left ear infection  Pt reports he's seen another doctor and issue has been continuing for months       ASSESSMENT AND PLAN:   1. Chronic otitis externa of left ear, unspecified type  Mr. HERNÁNDEZ is a 78 year old male PMHX chronic diastolic heart failure, type 2 diabetes with neuropathy, ESRD on HD, hypertension, hyperlipidemia, KRAIG, BPH coming in for post ER follow-up     He has been dealing with a chronic left ear infection for almost a year now.  He currently just finished 6 weeks of IV antibiotics due to his multidrug-resistant cultures with both fungus and bacteria.  He does have improvement in the otorrhea but it is still draining a little bit.    On exam there is a little polypoid tissue in the medial ear canal.  Still a little purulence within the ear canal that was debrided.  Does appear to be somewhat improved overall    Discussed that we will obtain a updated CT scan of his temporal bones.  If there has been no change then may consider referral for surgical management of this chronic otomastoiditis on the left.  If there has been improvement we may consider extending the IV therapy given the risk of anesthesia with his comorbidities.  Will follow-up on imaging results.    - CT TEMPORAL BONES (CPT=70480); Future      The patient indicates understanding of these issues and agrees to the plan.      EXAM:   Ht 5' 3\" (1.6 m)   Wt 166 lb (75.3 kg)   BMI 29.41 kg/m²     Pertinent exam findings may also be noted above in assessment and plan     System Details   Skin Inspection - Normal.   Constitutional Overall appearance - Normal.   Head/Face Symmetric, TMJ tenderness not present    Eyes EOMI, PERRL   Right ear:  Canal clear, TM intact, no AMIE   Left ear:  Canal clear, TM intact, no AMIE   Nose: Septum midline, inferior turbinates not enlarged, nasal valves without collapse    Oral cavity/Oropharynx: No  lesions or masses on inspection or palpation, tonsils symmetric    Neck: Soft without LAD, thyroid not enlarged  Voice clear/ no stridor   Other:      Scopes and Procedures:     Canals:  Left: Canal with cerumen preventing adequate view of TM, debrided with instrumentation    Tympanic Membranes:  Left: Normal tympanic membrane.     TM Visualized Method:   Left TM examined via otomicroscopy.      PROCEDURE:   Removal of cerumen impaction   The cerumen impaction was completely removed on the left side using microscopy as necessary.   Removal was completed by using a curette and suction.         Current Outpatient Medications   Medication Sig Dispense Refill    carvedilol 25 MG Oral Tab Take 1 tablet (25 mg total) by mouth 2 (two) times daily.      atorvastatin 40 MG Oral Tab Take 1 tablet (40 mg total) by mouth nightly. 90 tablet 3    escitalopram 5 MG Oral Tab Take 1 tablet (5 mg total) by mouth daily. 90 tablet 0    LANTUS SOLOSTAR 100 UNIT/ML Subcutaneous Solution Pen-injector Inject 16-18 Units into the skin nightly. 15 mL 1    apixaban 5 MG Oral Tab Take 1 tablet (5 mg total) by mouth 2 (two) times daily.      metoprolol tartrate 50 MG Oral Tab Take 1 tablet (50 mg total) by mouth 2 (two) times daily.      acetaminophen 500 MG Oral Tab Take 1 tablet (500 mg total) by mouth daily as needed for Pain.      cetirizine 10 MG Oral Tab Take 1 tablet (10 mg total) by mouth every other day.      Cholecalciferol 125 MCG (5000 UT) Oral Tab Take 1 tablet (5,000 Units total) by mouth 2 (two) times daily.      cyanocobalamin 100 MCG Oral Tab Take 2.5 tablets (250 mcg total) by mouth every other day.      polyethylene glycol, PEG 3350, 17 g Oral Powd Pack 17 g Wed, Thurs, Sat      psyllium 28 % Oral Powd Pack Mon-Tue-Fri      tetrahydrozoline 0.05 % Ophthalmic Solution 1 drop 2 (two) times daily as needed.      traMADol 50 MG Oral Tab Take 1 tablet (50 mg total) by mouth every 12 (twelve) hours as needed for Pain.       metoprolol tartrate 50 MG Oral Tab Take 1 tablet (50 mg total) by mouth 2 (two) times daily. 60 tablet 1    linaGLIPtin (TRADJENTA) 5 mg Oral Tab Take 1 tablet (5 mg total) by mouth daily. 90 tablet 1    Glucose Blood (CONTOUR NEXT TEST) In Vitro Strip Test 3 times daily 300 each 1    felodipine ER 10 MG Oral Tablet 24 Hr Take 1 tablet (10 mg total) by mouth daily. 90 tablet 3    fluticasone propionate 50 MCG/ACT Nasal Suspension 2 sprays by Nasal route daily. 48 g 3    pantoprazole 40 MG Oral Tab EC Take 1 tablet (40 mg total) by mouth every morning before breakfast. 90 tablet 3    methocarbamol 500 MG Oral Tab Take 1 tablet (500 mg total) by mouth 2 (two) times daily as needed. 60 tablet 1    Insulin Pen Needle (PEN NEEDLES) 32G X 4 MM Does not apply Misc 1 each daily. 100 each 0    albuterol (PROAIR HFA) 108 (90 Base) MCG/ACT Inhalation Aero Soln Inhale 2 puffs into the lungs every 4 (four) hours as needed for Wheezing. 3 each 3    Budesonide-Formoterol Fumarate (SYMBICORT) 160-4.5 MCG/ACT Inhalation Aerosol Inhale 2 puffs into the lungs 2 (two) times daily. (Patient taking differently: Inhale 2 puffs into the lungs 2 (two) times daily as needed.) 3 each 3    Darbepoetin Randell 60 MCG/ML Injection Solution Inject into the vein as needed.      aspirin 81 MG Oral Tab Take 1 tablet (81 mg total) by mouth daily.        Past Medical History:   Diagnosis Date    Anemia     Asthma     Back problem     BPH (benign prostatic hyperplasia)     Calculus of kidney     Cataract     Diabetes (HCC)     ESRD (end stage renal disease) on dialysis (HCC)     Essential hypertension     High blood pressure     High cholesterol     History of blood transfusion     Hyperlipidemia     Neuropathy     hands and feet    KRAIG on CPAP     Renal disorder     Sleep apnea     Visual impairment     glasses    Vocal cord paralysis, unilateral partial       Social History:  Social History     Socioeconomic History    Marital status:    Tobacco  Use    Smoking status: Former     Packs/day: 1.00     Years: 17.00     Additional pack years: 0.00     Total pack years: 17.00     Types: Cigarettes     Quit date: 1981     Years since quittin.0    Smokeless tobacco: Never   Vaping Use    Vaping Use: Never used   Substance and Sexual Activity    Alcohol use: No     Alcohol/week: 0.0 standard drinks of alcohol    Drug use: No    Sexual activity: Yes     Partners: Female     Social Determinants of Health     Financial Resource Strain: Low Risk  (2023)    Financial Resource Strain     Difficulty of Paying Living Expenses: Not hard at all     Med Affordability: No   Transportation Needs: No Transportation Needs (2023)    Transportation Needs     Lack of Transportation: No          Smooth Pfeiffer MD  2024  1:40 PM

## 2024-01-18 ENCOUNTER — HOSPITAL ENCOUNTER (OUTPATIENT)
Dept: CT IMAGING | Facility: HOSPITAL | Age: 79
Discharge: HOME | End: 2024-01-18
Attending: STUDENT IN AN ORGANIZED HEALTH CARE EDUCATION/TRAINING PROGRAM
Payer: MEDICARE

## 2024-01-18 DIAGNOSIS — H60.62 CHRONIC OTITIS EXTERNA OF LEFT EAR, UNSPECIFIED TYPE: ICD-10-CM

## 2024-01-18 PROCEDURE — 70480 CT ORBIT/EAR/FOSSA W/O DYE: CPT | Performed by: STUDENT IN AN ORGANIZED HEALTH CARE EDUCATION/TRAINING PROGRAM

## 2024-01-25 ENCOUNTER — TELEPHONE (OUTPATIENT)
Dept: OTOLARYNGOLOGY | Facility: CLINIC | Age: 79
End: 2024-01-25

## 2024-01-25 DIAGNOSIS — H60.62 CHRONIC OTITIS EXTERNA OF LEFT EAR, UNSPECIFIED TYPE: Primary | ICD-10-CM

## 2024-01-25 NOTE — TELEPHONE ENCOUNTER
Spoke with patient and his wife, and informed that per Dr. Pfeiffer a referral to Dr. Edgar at [facility], otologist was created and patient with wife called. Instructed to access external referral through [employer] and call dr. Herman Edgar at [facility] at number provided to patient  Per Dr. Pfeiffer,  it looks like there was some improvement although not completely and MD thinks it would be best to at least get an opinion regarding a surgery regarding his ear.  If they want to discuss this further they can let Dr. Pfeiffer  know but MD thinks  before proceeding with IV antibiotics they should get a surgical opinion. Both patient and wife verbalized understanding.    Dr. Pfeiffer, if patient and wife  need to talk to you further, would you like an appointment to be made or would you call them.

## 2024-01-31 ENCOUNTER — HOSPITAL ENCOUNTER (EMERGENCY)
Facility: HOSPITAL | Age: 79
Discharge: HOME | End: 2024-01-31
Attending: EMERGENCY MEDICINE
Payer: MEDICARE

## 2024-01-31 ENCOUNTER — PATIENT MESSAGE (OUTPATIENT)
Dept: OTOLARYNGOLOGY | Facility: CLINIC | Age: 79
End: 2024-01-31

## 2024-01-31 VITALS
DIASTOLIC BLOOD PRESSURE: 71 MMHG | OXYGEN SATURATION: 96 % | TEMPERATURE: 98 F | WEIGHT: 163 LBS | HEART RATE: 74 BPM | SYSTOLIC BLOOD PRESSURE: 155 MMHG | BODY MASS INDEX: 27.16 KG/M2 | RESPIRATION RATE: 16 BRPM | HEIGHT: 65 IN

## 2024-01-31 DIAGNOSIS — H92.22 OTORRHAGIA OF LEFT EAR: Primary | ICD-10-CM

## 2024-01-31 PROCEDURE — 99283 EMERGENCY DEPT VISIT LOW MDM: CPT

## 2024-01-31 PROCEDURE — 99284 EMERGENCY DEPT VISIT MOD MDM: CPT

## 2024-01-31 RX ORDER — TRANEXAMIC ACID 100 MG/ML
5 INJECTION, SOLUTION INTRAVENOUS ONCE
Status: DISCONTINUED | OUTPATIENT
Start: 2024-01-31 | End: 2024-01-31

## 2024-01-31 RX ORDER — OFLOXACIN 3 MG/ML
3 SOLUTION AURICULAR (OTIC) 2 TIMES DAILY
Qty: 10 ML | Refills: 0 | Status: SHIPPED | OUTPATIENT
Start: 2024-01-31 | End: 2024-02-07

## 2024-01-31 RX ORDER — OXYMETAZOLINE HYDROCHLORIDE 0.05 G/100ML
1 SPRAY NASAL EVERY 12 HOURS PRN
Status: DISCONTINUED | OUTPATIENT
Start: 2024-01-31 | End: 2024-01-31

## 2024-01-31 NOTE — ED INITIAL ASSESSMENT (HPI)
Scratching left ear this am when it began to bleed. Recent left ear infection in Dec/2023. On Eliquis.
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
## 2024-02-02 ENCOUNTER — PATIENT OUTREACH (OUTPATIENT)
Dept: CASE MANAGEMENT | Age: 79
End: 2024-02-02

## 2024-02-02 NOTE — TELEPHONE ENCOUNTER
From: Ollie Hernández  To: Smooth Pfeiffer  Sent: 1/31/2024 3:05 PM CST  Subject: Ear bleeding     Hello    I am here in ([facility] with Ollie Hernández and his ear started to bleed about 2 hours ago. What’s next?  Thank you so much,  Stephy HERNÁNDEZ

## 2024-02-02 NOTE — PROGRESS NOTES
1st attempt ER f/up apt request  [facility] -existing apt (2/5)  PCP -existing apt (2/29)  Pt will only see Dr. Parmar    Confirmed w/ pt  Closing encounter

## 2024-02-05 ENCOUNTER — OFFICE VISIT (OUTPATIENT)
Dept: OTOLARYNGOLOGY | Facility: CLINIC | Age: 79
End: 2024-02-05

## 2024-02-05 VITALS — WEIGHT: 165 LBS | BODY MASS INDEX: 27.49 KG/M2 | HEIGHT: 65 IN

## 2024-02-05 DIAGNOSIS — H70.12 CHRONIC MASTOIDITIS, LEFT: ICD-10-CM

## 2024-02-05 DIAGNOSIS — H60.62 CHRONIC OTITIS EXTERNA OF LEFT EAR, UNSPECIFIED TYPE: Primary | ICD-10-CM

## 2024-02-05 NOTE — PROGRESS NOTES
Ollie Hernández is a 78 year old male.   Chief Complaint   Patient presents with    Follow - Up     chronic otitis externa of left ear, unspecified type       ASSESSMENT AND PLAN:   1. Chronic otitis externa of left ear, unspecified type  78-year-old who has had a chronic left-sided otomastoiditis for greater than 1 year with a multidrug-resistant bacteria who underwent IV antibiotic therapy with only partial improvement in his symptoms.  He had only partial response on the posttreatment CT scan.  He had a bleeding episode from his left ear for which she was seen in the emergency room recently with an ear wick placed.  He notes that he was scratching aggressively at his ear at the time    On exam his ear exam actually looks the best that I have seen it.  There is no more polyp nor tympanic membrane perforation or otorrhea.  No evidence of bleeding    Discussed that his ear exam looks pretty good today.  They are still going to keep their appointment with the otologist at [place] given his chronic otomastoiditis and if any surgical treatment is needed.  They will bring the CT scan and reports.  With regards to his bleeding discussed not scratching his ears and continuing the otic drops as they seem to be helping.  Possibly the otowick helped resolve the polyp which was the source of the bleeding which I do not see today.    2. Chronic mastoiditis, left        The patient indicates understanding of these issues and agrees to the plan.      EXAM:   Ht 5' 5\" (1.651 m)   Wt 165 lb (74.8 kg)   BMI 27.46 kg/m²     Pertinent exam findings may also be noted above in assessment and plan     System Details   Skin Inspection - Normal.   Constitutional Overall appearance - Normal.   Head/Face Symmetric, TMJ tenderness not present    Eyes EOMI, PERRL   Right ear:  Canal clear, TM intact, no AMIE   Left ear:  Canal clear, TM intact, no AMIE   Nose: Septum midline, inferior turbinates not enlarged, nasal valves without collapse     Oral cavity/Oropharynx: No lesions or masses on inspection or palpation, tonsils symmetric    Neck: Soft without LAD, thyroid not enlarged  Voice clear/ no stridor   Other:      Scopes and Procedures:     Canals:  Left: Canal with cerumen preventing adequate view of TM, debrided with instrumentation    Tympanic Membranes:  Left: Normal tympanic membrane.     TM Visualized Method:   Left TM examined via otomicroscopy.      PROCEDURE:   Removal of cerumen impaction   The cerumen impaction was completely removed on the left side using microscopy as necessary.   Removal was completed by using a curette and suction.         Current Outpatient Medications   Medication Sig Dispense Refill    ofloxacin 0.3 % Otic Solution Place 3 drops into the left ear 2 (two) times daily for 7 days. 10 mL 0    carvedilol 25 MG Oral Tab Take 1 tablet (25 mg total) by mouth 2 (two) times daily.      atorvastatin 40 MG Oral Tab Take 1 tablet (40 mg total) by mouth nightly. 90 tablet 3    escitalopram 5 MG Oral Tab Take 1 tablet (5 mg total) by mouth daily. 90 tablet 0    LANTUS SOLOSTAR 100 UNIT/ML Subcutaneous Solution Pen-injector Inject 16-18 Units into the skin nightly. 15 mL 1    apixaban 5 MG Oral Tab Take 1 tablet (5 mg total) by mouth 2 (two) times daily.      metoprolol tartrate 50 MG Oral Tab Take 1 tablet (50 mg total) by mouth 2 (two) times daily.      acetaminophen 500 MG Oral Tab Take 1 tablet (500 mg total) by mouth daily as needed for Pain.      cetirizine 10 MG Oral Tab Take 1 tablet (10 mg total) by mouth every other day.      Cholecalciferol 125 MCG (5000 UT) Oral Tab Take 1 tablet (5,000 Units total) by mouth 2 (two) times daily.      cyanocobalamin 100 MCG Oral Tab Take 2.5 tablets (250 mcg total) by mouth every other day.      polyethylene glycol, PEG 3350, 17 g Oral Powd Pack 17 g Wed, Thurs, Sat      psyllium 28 % Oral Powd Pack Mon-Tue-Fri      tetrahydrozoline 0.05 % Ophthalmic Solution 1 drop 2 (two) times daily  as needed.      traMADol 50 MG Oral Tab Take 1 tablet (50 mg total) by mouth every 12 (twelve) hours as needed for Pain.      metoprolol tartrate 50 MG Oral Tab Take 1 tablet (50 mg total) by mouth 2 (two) times daily. 60 tablet 1    linaGLIPtin (TRADJENTA) 5 mg Oral Tab Take 1 tablet (5 mg total) by mouth daily. 90 tablet 1    Glucose Blood (CONTOUR NEXT TEST) In Vitro Strip Test 3 times daily 300 each 1    felodipine ER 10 MG Oral Tablet 24 Hr Take 1 tablet (10 mg total) by mouth daily. 90 tablet 3    fluticasone propionate 50 MCG/ACT Nasal Suspension 2 sprays by Nasal route daily. 48 g 3    pantoprazole 40 MG Oral Tab EC Take 1 tablet (40 mg total) by mouth every morning before breakfast. 90 tablet 3    methocarbamol 500 MG Oral Tab Take 1 tablet (500 mg total) by mouth 2 (two) times daily as needed. 60 tablet 1    Insulin Pen Needle (PEN NEEDLES) 32G X 4 MM Does not apply Misc 1 each daily. 100 each 0    albuterol (PROAIR HFA) 108 (90 Base) MCG/ACT Inhalation Aero Soln Inhale 2 puffs into the lungs every 4 (four) hours as needed for Wheezing. 3 each 3    Budesonide-Formoterol Fumarate (SYMBICORT) 160-4.5 MCG/ACT Inhalation Aerosol Inhale 2 puffs into the lungs 2 (two) times daily. (Patient taking differently: Inhale 2 puffs into the lungs 2 (two) times daily as needed.) 3 each 3    Darbepoetin Randell 60 MCG/ML Injection Solution Inject into the vein as needed.      aspirin 81 MG Oral Tab Take 1 tablet (81 mg total) by mouth daily.        Past Medical History:   Diagnosis Date    Anemia     Asthma     Back problem     BPH (benign prostatic hyperplasia)     Calculus of kidney     Cataract     Diabetes (HCC)     ESRD (end stage renal disease) on dialysis (HCC)     Essential hypertension     High blood pressure     High cholesterol     History of blood transfusion     Hyperlipidemia     Neuropathy     hands and feet    KRAIG on CPAP     Renal disorder     Sleep apnea     Visual impairment     glasses    Vocal cord  paralysis, unilateral partial       Social History:  Social History     Socioeconomic History    Marital status:    Tobacco Use    Smoking status: Former     Packs/day: 1.00     Years: 17.00     Additional pack years: 0.00     Total pack years: 17.00     Types: Cigarettes     Quit date: 1981     Years since quittin.1    Smokeless tobacco: Never   Vaping Use    Vaping Use: Never used   Substance and Sexual Activity    Alcohol use: No     Alcohol/week: 0.0 standard drinks of alcohol    Drug use: No    Sexual activity: Yes     Partners: Female     Social Determinants of Health     Financial Resource Strain: Low Risk  (2023)    Financial Resource Strain     Difficulty of Paying Living Expenses: Not hard at all     Med Affordability: No   Transportation Needs: No Transportation Needs (2023)    Transportation Needs     Lack of Transportation: No          Smooth Pfeiffer MD  2024  3:16 PM

## 2024-02-16 NOTE — TELEPHONE ENCOUNTER
S/w pt - pt states he has an appt to see Dr. Parmar 2/29/24 and it is up to Dr. Parmar to decide if he needs this medication or not. He could not answer how he's doing on it.   States \"I have a lot of problems but I will talk to Dr. Parmar about it\"    Awaiting 2/29 eval
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
## 2024-02-29 ENCOUNTER — OFFICE VISIT (OUTPATIENT)
Dept: INTERNAL MEDICINE CLINIC | Facility: CLINIC | Age: 79
End: 2024-02-29

## 2024-02-29 VITALS
HEART RATE: 81 BPM | BODY MASS INDEX: 28.16 KG/M2 | SYSTOLIC BLOOD PRESSURE: 140 MMHG | OXYGEN SATURATION: 94 % | TEMPERATURE: 97 F | WEIGHT: 169 LBS | DIASTOLIC BLOOD PRESSURE: 56 MMHG | HEIGHT: 65 IN

## 2024-02-29 DIAGNOSIS — E11.40 TYPE 2 DIABETES MELLITUS WITH DIABETIC NEUROPATHY, WITH LONG-TERM CURRENT USE OF INSULIN (HCC): ICD-10-CM

## 2024-02-29 DIAGNOSIS — G47.33 OSA ON CPAP: ICD-10-CM

## 2024-02-29 DIAGNOSIS — I48.0 PAROXYSMAL ATRIAL FIBRILLATION (HCC): ICD-10-CM

## 2024-02-29 DIAGNOSIS — E55.9 VITAMIN D DEFICIENCY: ICD-10-CM

## 2024-02-29 DIAGNOSIS — M54.12 CERVICAL RADICULOPATHY: ICD-10-CM

## 2024-02-29 DIAGNOSIS — Z13.29 SCREENING FOR THYROID DISORDER: ICD-10-CM

## 2024-02-29 DIAGNOSIS — D63.1 ANEMIA OF CHRONIC RENAL FAILURE, STAGE 5 (HCC): ICD-10-CM

## 2024-02-29 DIAGNOSIS — I50.32 CHRONIC DIASTOLIC CONGESTIVE HEART FAILURE (HCC): ICD-10-CM

## 2024-02-29 DIAGNOSIS — R29.6 RECURRENT FALLS: ICD-10-CM

## 2024-02-29 DIAGNOSIS — R42 CHRONIC VERTIGO: ICD-10-CM

## 2024-02-29 DIAGNOSIS — I10 ESSENTIAL HYPERTENSION: ICD-10-CM

## 2024-02-29 DIAGNOSIS — R42 DYSEQUILIBRIUM: ICD-10-CM

## 2024-02-29 DIAGNOSIS — N18.5 ANEMIA OF CHRONIC RENAL FAILURE, STAGE 5 (HCC): ICD-10-CM

## 2024-02-29 DIAGNOSIS — R13.19 OTHER DYSPHAGIA: ICD-10-CM

## 2024-02-29 DIAGNOSIS — Z09 HOSPITAL DISCHARGE FOLLOW-UP: Primary | ICD-10-CM

## 2024-02-29 DIAGNOSIS — E78.2 MIXED HYPERLIPIDEMIA: ICD-10-CM

## 2024-02-29 DIAGNOSIS — Z79.4 TYPE 2 DIABETES MELLITUS WITH DIABETIC NEUROPATHY, WITH LONG-TERM CURRENT USE OF INSULIN (HCC): ICD-10-CM

## 2024-02-29 PROBLEM — J41.0 SMOKERS' COUGH (HCC): Chronic | Status: ACTIVE | Noted: 2024-01-01

## 2024-02-29 PROBLEM — J41.0 SMOKERS' COUGH (HCC): Chronic | Status: ACTIVE | Noted: 2024-02-29

## 2024-02-29 PROCEDURE — 99214 OFFICE O/P EST MOD 30 MIN: CPT | Performed by: INTERNAL MEDICINE

## 2024-02-29 PROCEDURE — 99499 UNLISTED E&M SERVICE: CPT | Performed by: INTERNAL MEDICINE

## 2024-02-29 PROCEDURE — 1159F MED LIST DOCD IN RCRD: CPT | Performed by: INTERNAL MEDICINE

## 2024-02-29 PROCEDURE — 1160F RVW MEDS BY RX/DR IN RCRD: CPT | Performed by: INTERNAL MEDICINE

## 2024-02-29 PROCEDURE — 3077F SYST BP >= 140 MM HG: CPT | Performed by: INTERNAL MEDICINE

## 2024-02-29 PROCEDURE — 3078F DIAST BP <80 MM HG: CPT | Performed by: INTERNAL MEDICINE

## 2024-02-29 PROCEDURE — 1125F AMNT PAIN NOTED PAIN PRSNT: CPT | Performed by: INTERNAL MEDICINE

## 2024-02-29 PROCEDURE — 3008F BODY MASS INDEX DOCD: CPT | Performed by: INTERNAL MEDICINE

## 2024-02-29 RX ORDER — HYDROCORTISONE 20 MG/1
20 TABLET ORAL DAILY
Qty: 5 TABLET | Refills: 0 | Status: SHIPPED
Start: 2024-02-29 | End: 2024-03-05

## 2024-02-29 RX ORDER — ESCITALOPRAM OXALATE 5 MG/1
5 TABLET ORAL DAILY
Qty: 90 TABLET | Refills: 3 | Status: SHIPPED | OUTPATIENT
Start: 2024-02-29

## 2024-03-05 ENCOUNTER — TELEPHONE (OUTPATIENT)
Dept: INTERNAL MEDICINE CLINIC | Facility: CLINIC | Age: 79
End: 2024-03-05

## 2024-03-05 NOTE — TELEPHONE ENCOUNTER
Dayna / KENYATTA calling they received an order for a walker and it is missing criteria    They are requesting  Face to Face  Qualifying diagnosis & Criteria    Please fax # [phone]

## 2024-03-07 ENCOUNTER — TELEPHONE (OUTPATIENT)
Dept: INTERNAL MEDICINE CLINIC | Facility: CLINIC | Age: 79
End: 2024-03-07

## 2024-03-07 NOTE — TELEPHONE ENCOUNTER
Rosalia YOU is calling they still require criteria in the face to face notes.  She will be faxing an example of what is needed.

## 2024-03-10 RX ORDER — ESCITALOPRAM OXALATE 5 MG/1
5 TABLET ORAL DAILY
Qty: 90 TABLET | Refills: 3 | OUTPATIENT
Start: 2024-03-10

## 2024-03-11 RX ORDER — LINAGLIPTIN 5 MG/1
5 TABLET, FILM COATED ORAL DAILY
Qty: 90 TABLET | Refills: 0 | Status: SHIPPED | OUTPATIENT
Start: 2024-03-11

## 2024-03-11 NOTE — TELEPHONE ENCOUNTER
Please add on my schedule ( before 2 pm)  in the next 1 month patient has not been seen since September 2023.  Thanks.

## 2024-04-02 ENCOUNTER — OFFICE VISIT (OUTPATIENT)
Dept: ENDOCRINOLOGY CLINIC | Facility: CLINIC | Age: 79
End: 2024-04-02

## 2024-04-02 VITALS
BODY MASS INDEX: 28 KG/M2 | WEIGHT: 171 LBS | HEART RATE: 73 BPM | DIASTOLIC BLOOD PRESSURE: 58 MMHG | SYSTOLIC BLOOD PRESSURE: 142 MMHG

## 2024-04-02 DIAGNOSIS — N18.6 TYPE 2 DIABETES MELLITUS WITH ESRD (END-STAGE RENAL DISEASE) (HCC): Primary | ICD-10-CM

## 2024-04-02 DIAGNOSIS — E78.5 DYSLIPIDEMIA: ICD-10-CM

## 2024-04-02 DIAGNOSIS — E11.22 TYPE 2 DIABETES MELLITUS WITH ESRD (END-STAGE RENAL DISEASE) (HCC): Primary | ICD-10-CM

## 2024-04-02 LAB
CARTRIDGE LOT#: NORMAL NUMERIC
GLUCOSE BLOOD: 107
HEMOGLOBIN A1C: 5.3 % (ref 4.3–5.6)
TEST STRIP LOT #: NORMAL NUMERIC

## 2024-04-02 PROCEDURE — 3078F DIAST BP <80 MM HG: CPT | Performed by: INTERNAL MEDICINE

## 2024-04-02 PROCEDURE — 83036 HEMOGLOBIN GLYCOSYLATED A1C: CPT | Performed by: INTERNAL MEDICINE

## 2024-04-02 PROCEDURE — 1159F MED LIST DOCD IN RCRD: CPT | Performed by: INTERNAL MEDICINE

## 2024-04-02 PROCEDURE — 1160F RVW MEDS BY RX/DR IN RCRD: CPT | Performed by: INTERNAL MEDICINE

## 2024-04-02 PROCEDURE — 82947 ASSAY GLUCOSE BLOOD QUANT: CPT | Performed by: INTERNAL MEDICINE

## 2024-04-02 PROCEDURE — 95251 CONT GLUC MNTR ANALYSIS I&R: CPT | Performed by: INTERNAL MEDICINE

## 2024-04-02 PROCEDURE — 99213 OFFICE O/P EST LOW 20 MIN: CPT | Performed by: INTERNAL MEDICINE

## 2024-04-02 PROCEDURE — 3077F SYST BP >= 140 MM HG: CPT | Performed by: INTERNAL MEDICINE

## 2024-04-02 NOTE — PROGRESS NOTES
Diabetes FU      HISTORY OF PRESENT ILLNESS   Ollie Hernández is a 78 year old male who presents for diabetes FU.    ESRD on HD now M/ W/F    Diagnosed with diabetes in 1982, started on Metformin and started on insulin in 1993 (was seeen at [facility])     Family hx of diabetes: Grandparents , mother      Dietary compliance: moderate  Exercise: No  Polyuria/polydipsia: Yes  Blurred vision: Yes    Episodes of hypoglycemia: Yes  Blood Glucose:  Dexcom download asbelow    Medications for DM   Lantus 18-20 nightly   Tradjenta 5 mg daily      REVIEW OF SYSTEMS    Eyes: Diabetic retinopathy present: No            Most recent visit to eye doctor in last 12 months: Yes, July 2022, cataract sx Dec 2021, jan 2022, last  apt in July 2023    Oral/ Dental Care : Recent visit to dentist in last 6-12 months    CV: Cardiovascular disease present: No         Hypertension present: Yes         Hyperlipidemia present: Yes statin          Peripheral Vascular Disease present: No         Heart Failure: No    : Nephropathy present: Yes    Neuro: Neuropathy present: Yes    Skin: Infection or ulceration: No            Follows with Podiatry: Yes    Feet  DeniesHistory of ulceration, amputation, Charcot foot, angioplasty or vascular surgery  Positive for +, neuropathy (pain, burning, numbness) in feet  .     Osteoporosis: No    Thyroid disease: No    Celiac disease: No    Psychiatric:    Little interest or pleasure in doing things:No   Feeling down, depressed or hopeless : No      Medications:     Current Outpatient Medications:     linaGLIPtin (TRADJENTA) 5 mg Oral Tab, Take 1 tablet (5 mg total) by mouth daily., Disp: 90 tablet, Rfl: 0    LANTUS SOLOSTAR 100 UNIT/ML Subcutaneous Solution Pen-injector, Inject 16-18 Units into the skin nightly., Disp: 15 mL, Rfl: 1    escitalopram 5 MG Oral Tab, Take 1 tablet (5 mg total) by mouth daily., Disp: 90 tablet, Rfl: 3    carvedilol 25 MG Oral Tab, Take 1 tablet (25 mg total) by mouth 2 (two) times  daily., Disp: , Rfl:     atorvastatin 40 MG Oral Tab, Take 1 tablet (40 mg total) by mouth nightly., Disp: 90 tablet, Rfl: 3    apixaban 5 MG Oral Tab, Take 1 tablet (5 mg total) by mouth 2 (two) times daily., Disp: , Rfl:     metoprolol tartrate 50 MG Oral Tab, Take 1 tablet (50 mg total) by mouth 2 (two) times daily., Disp: , Rfl:     acetaminophen 500 MG Oral Tab, Take 1 tablet (500 mg total) by mouth daily as needed for Pain., Disp: , Rfl:     cetirizine 10 MG Oral Tab, Take 1 tablet (10 mg total) by mouth every other day., Disp: , Rfl:     Cholecalciferol 125 MCG (5000 UT) Oral Tab, Take 1 tablet (5,000 Units total) by mouth 2 (two) times daily., Disp: , Rfl:     cyanocobalamin 100 MCG Oral Tab, Take 2.5 tablets (250 mcg total) by mouth every other day., Disp: , Rfl:     polyethylene glycol, PEG 3350, 17 g Oral Powd Pack, 17 g Wed, Thurs, Sat, Disp: , Rfl:     psyllium 28 % Oral Powd Pack, Mon-Tue-Fri, Disp: , Rfl:     tetrahydrozoline 0.05 % Ophthalmic Solution, 1 drop 2 (two) times daily as needed., Disp: , Rfl:     traMADol 50 MG Oral Tab, Take 1 tablet (50 mg total) by mouth every 12 (twelve) hours as needed for Pain., Disp: , Rfl:     metoprolol tartrate 50 MG Oral Tab, Take 1 tablet (50 mg total) by mouth 2 (two) times daily., Disp: 60 tablet, Rfl: 1    Glucose Blood (CONTOUR NEXT TEST) In Vitro Strip, Test 3 times daily, Disp: 300 each, Rfl: 1    felodipine ER 10 MG Oral Tablet 24 Hr, Take 1 tablet (10 mg total) by mouth daily., Disp: 90 tablet, Rfl: 3    fluticasone propionate 50 MCG/ACT Nasal Suspension, 2 sprays by Nasal route daily., Disp: 48 g, Rfl: 3    pantoprazole 40 MG Oral Tab EC, Take 1 tablet (40 mg total) by mouth every morning before breakfast., Disp: 90 tablet, Rfl: 3    methocarbamol 500 MG Oral Tab, Take 1 tablet (500 mg total) by mouth 2 (two) times daily as needed., Disp: 60 tablet, Rfl: 1    Insulin Pen Needle (PEN NEEDLES) 32G X 4 MM Does not apply Misc, 1 each daily., Disp: 100  each, Rfl: 0    albuterol (PROAIR HFA) 108 (90 Base) MCG/ACT Inhalation Aero Soln, Inhale 2 puffs into the lungs every 4 (four) hours as needed for Wheezing., Disp: 3 each, Rfl: 3    Budesonide-Formoterol Fumarate (SYMBICORT) 160-4.5 MCG/ACT Inhalation Aerosol, Inhale 2 puffs into the lungs 2 (two) times daily. (Patient taking differently: Inhale 2 puffs into the lungs 2 (two) times daily as needed.), Disp: 3 each, Rfl: 3    Darbepoetin Randell 60 MCG/ML Injection Solution, Inject into the vein as needed., Disp: , Rfl:     aspirin 81 MG Oral Tab, Take 1 tablet (81 mg total) by mouth daily., Disp: , Rfl:      Allergies:   Allergies   Allergen Reactions    Adhesive Tape OTHER (SEE COMMENTS)     Severe rashes    Dust Mite Extract RASH       Social History:   Social History     Socioeconomic History    Marital status:    Tobacco Use    Smoking status: Former     Packs/day: 1.00     Years: 17.00     Additional pack years: 0.00     Total pack years: 17.00     Types: Cigarettes     Quit date: 1981     Years since quittin.2    Smokeless tobacco: Never   Vaping Use    Vaping Use: Never used   Substance and Sexual Activity    Alcohol use: No     Alcohol/week: 0.0 standard drinks of alcohol    Drug use: No    Sexual activity: Yes     Partners: Female       Medical History:   Past Medical History:   Diagnosis Date    Anemia     Asthma (HCC)     Back problem     BPH (benign prostatic hyperplasia)     Calculus of kidney     Cataract     Diabetes (HCC)     ESRD (end stage renal disease) on dialysis (HCC)     Essential hypertension     High blood pressure     High cholesterol     History of blood transfusion     Hyperlipidemia     Neuropathy     hands and feet    KRAIG on CPAP     Renal disorder     Sleep apnea     Visual impairment     glasses    Vocal cord paralysis, unilateral partial        Surgical history:   Past Surgical History:   Procedure Laterality Date    APPENDECTOMY          BACK SURGERY      Neck/back  - 1998    CAPSULE ENDOSCOPY - INTERNAL REFERRAL      CATARACT      12/2021 and 1/2022    COLONOSCOPY      COLONOSCOPY N/A 1/25/2021    Procedure: COLONOSCOPY;  Surgeon: Michael Gautam MD;  Location: [facility] ENDO    HAND/FINGER SURGERY UNLISTED      Accidental trauma    UPPER GI ENDOSCOPY,DIAGNOSIS           PHYSICAL EXAM  Vitals:    04/02/24 0853   BP: 142/58   Pulse: 73   Weight: 171 lb (77.6 kg)         General Appearance:  alert, well developed, in no acute distress  Head: Atraumatic  Eyes:  normal conjunctivae, sclera., normal sclera and normal pupils  Throat/Neck: normal sound to voice. Normal hearing, normal speech  Respiratory:  Speaking in full sentences, non-labored. no increased work of breathing, no audible wheezing    Neuro: motor grossly intact, moving all extremities without difficulty  Psychiatric:  oriented to time, self, and place  Extremities:  Sep 2023:   Bilateral barefoot skin diabetic exam is normal, visualized feet and the appearance is normal.  Bilateral monofilament/sensation of both feet is  decreased   Pulsation pedal pulse exam of both lower legs/feet is normal as well.      Follows with podiatry, has diabetic shoes      Lab Data:    a1c is 5.3 %     ASSESSMENT/PLAN:    1. Diabetes Mellitus Type 2:  With ESRD  Discussed importance of glycemic control and patient verbalized understanding of diabetes pathogenesis and glycemic control to prevent the diabetes complications   -Lifestyle/ Diet/ Exercise reviewed the following   -Discussed importance of SBGM  -Low CHO diet, recommend 45gm per meal or 135gm per day  -Low CHO diet and CHO counting  -Checking BG levels at home      Diabetic Medications   Continuous Glucose Monitoring Interpretation    Ollie Hernández has undergone continuous glucose monitoring with the personal dexcom. He was evaluated with the dexcom for March 20-April 2, 2024.  His blood glucose tracings demonstrated:   2 % very high   25 %high   72 % in range  < 1 % low   < 1  % very low     As a result of the monitoring the following plan was made:     No significant hypoglycemia  He has ESRD that can lower A1c      Lantus  20 Nightly, on Dialysis days take 16-18  nightly   linagliptin 5 mg daily  Continue to check sugars, call if he has low BG under 70    Check before BF and before dinner  Call with BG as discussed        2. Hypertension / Blood pressure   BP is normal today  Monitor BP at home  FU with PCP and nephrology    3. Hyperlipidemia / Lipids   Reviewed goal lipid/ low fat diet  C/w statin, reports compliance  Discussed the potential side effects of statins including muscle and liver injury.    4. Diabetic neuropathy  Stable on OTC pain relief cream  CPM  Given age and GFR, will avoid oral medications given increased for SE          RTC in 3-4 months  Call with BG as discussed.     Patient verbalized a complete  understanding of all of the above and did not have any further questions.           Orders Placed This Encounter   Procedures    POC HemoCue Glucose 201 (Finger stick glucose)    POC Glycohemoglobin [91021]     Pushpa Sevilla MD

## 2024-04-18 ENCOUNTER — TELEPHONE (OUTPATIENT)
Dept: INTERNAL MEDICINE CLINIC | Facility: CLINIC | Age: 79
End: 2024-04-18

## 2024-04-18 ENCOUNTER — HOSPITAL ENCOUNTER (OUTPATIENT)
Dept: GENERAL RADIOLOGY | Facility: HOSPITAL | Age: 79
Discharge: HOME | End: 2024-04-18
Attending: INTERNAL MEDICINE
Payer: MEDICARE

## 2024-04-18 DIAGNOSIS — R13.19 OTHER DYSPHAGIA: Primary | ICD-10-CM

## 2024-04-18 DIAGNOSIS — R13.19 OTHER DYSPHAGIA: ICD-10-CM

## 2024-04-18 PROCEDURE — 92611 MOTION FLUOROSCOPY/SWALLOW: CPT

## 2024-04-18 PROCEDURE — 74230 X-RAY XM SWLNG FUNCJ C+: CPT | Performed by: INTERNAL MEDICINE

## 2024-04-18 NOTE — PATIENT INSTRUCTIONS
VIDEO SWALLOW STUDY    Diet Recommendations:  Solids: Regular  Liquids: Thin    Recommended compensatory strategies:   Sit upright  Small sips  No straw    Medication Administration:  Take whole in pureed    Further Follow-up:  Recommend swallowing therapy.  Please call number below to schedule.    Alba Ruth MA/CCC-SLP  Speech Language Pathologist  [facility]  [phone]

## 2024-04-18 NOTE — PROGRESS NOTES
ADULT VIDEOFLUOROSCOPIC SWALLOWING STUDY       ADULT VIDEOFLUOROSCOPIC SWALLOWING STUDY:   Referring Physician: Ghulam      Radiologist: Dr. Bolden  Diagnosis: dysphagia    Date of Service: 4/18/2024     PATIENT SUMMARY   Chief Complaint: The patient reports coughing and choking several times a day with food and liquid for the past 3 months.  He had a swallowing test many years ago, but does not remember the results.  He has shortness of breath, but no recent CXR or pneumonia.  His last episode of pneumonia was 11 years ago.  He denies weight loss, but his weight fluctuates due to dialysis.  He has odynophagia when the choking episodes occur rated 7/10 at the most.      Current Diet: regular foods and liquid       Problem List  Active Problems:  Active Problems:    * No active hospital problems. *      Past Medical History  Past Medical History:    Anemia    Asthma (HCC)    Back problem    BPH (benign prostatic hyperplasia)    Calculus of kidney    Cataract    Diabetes (HCC)    ESRD (end stage renal disease) on dialysis (HCC)    Essential hypertension    High blood pressure    High cholesterol    History of blood transfusion    Hyperlipidemia    Neuropathy    hands and feet    KRAIG on CPAP    Renal disorder    Sleep apnea    Visual impairment    glasses    Vocal cord paralysis, unilateral partial        Imaging results: No recent CXR    ASSESSMENT   DYSPHAGIA ASSESSMENT  Test completed in conjunction with Radiologist.   Food/Liquid Types Presented: puree, solid, nectar thick liquid, thin liquids, and barium tablet.    Study Position and View:  Patient was seated upright and viewed laterally and A-P.    Pain Assessment: The patient reports pain at a level of 0/10.    Oral phase:  Bilabial seal was intact with no anterior food or liquid loss.  Mastication was timely and complete.  Reduced bolus formation, control and containment was evidenced by piecemeal posterior propulsion and increased oral transit times with  puree and solids and premature spillage with thin liquids.  Minimal oral residue remained.    Pharyngeal phase:    The pharyngeal response triggered at the valleculae for puree and solids and at the tongue base to pyriform sinuses for liquids due to premature spillage.  Reduced and delayed velum excursion, base of tongue retraction, epiglottic inversion and hyolaryngeal excursion resulted in transient penetration into the nasopharynx intermittently with thin liquids, intermittent laryngeal penetration with liquids and mild vallecular and pyriform sinus retention.  Laryngeal penetration occurred with thin and mildly thick liquids before and /or during the swallow.  With mildly thick liquids, the penetration was transient, ejecting from the laryngeal vestibule with the force of the completion of the swallow.  Thin liquids by cup penetrated with and without spontaneous ejection.  Thin liquids by straw penetrated to the level of the vocal cords before the swallow with throat clear in response. When cued for strong cough, the patient was able to eject.  Trace posterior penetration occurred with solid spilling over from the pyriform sinuses and remaining in the upper laryngeal vestibule.  The patient was unable to perform a chin tuck posture due to multiple bridging osteophytes along the entire cervical spine.  A-P view revealed material traversed down the left pharynx only.    Esophageal phase:   A small pouch like area suspected to be a small Zenker's diverticulum was observed retaining food/liquid on the left side of the upper esophagus at the level of C5-C6.  A barium tablet was lodged at this site with the initial swallow with water but was cleared with 2-3 additional drinks of water.    Penetration Aspiration Scale: 5/8.  Material entered the airway, contacted the vocal cords, and was not ejected from the airway.    Overall Impression: Mild-moderate oropharyngeal dysphagia was characterized by premature spillage,  impaired velum elevation and posterior excursion resulting in transient invasion of liquids into the nasopharynx, laryngeal penetration with liquids (deep to the level of the vocal cords by straw) and trace with solid consistency, mild vallecular and pyriform sinus retention. A small diverticulum was observed at the level of C5-C6 which retained a small amount of food/liquid and, momentarily, a barium tablet.  The large band of cervical osteophytes appear to contribute to the dysphagia by narrowing the pharynx and anchoring the laryngeal and pharyngeal muscles.      Recommend general diet with thin liquids in small, single sips.  Dysphagia therapy is warranted.    FCM category and level: Swallowing, 4  PLAN   Potential: Good    Diet Recommendations:  Solids: Regular  Liquids: Thin    Recommended compensatory strategies:   Sit upright  Small sips  No straw    Medication Administration:  Take whole in pureed    Further Follow-up:  Recommend dysphagia therapy    GOALS (to be met 6 visits)  The patient will tolerate general diet consistency and thin liquids without overt signs or symptoms of aspiration with 100 % accuracy  The patient/family/caregiver will demonstrate understanding and implementation of aspiration precautions and swallow strategies independently  Sit upright  Small sips  No straw   Patient will reduce risk of aspiration by completing dysphagia exercises to 90% accuracy     EDUCATION/INSTRUCTION  Reviewed results and recommendations with patient and family.  Written instructions were provided.  Agreement/Understanding verbalized and all questions answered to their apparent satisfaction.      INTERDISCIPLINARY COMMUNICATION  Reviewed results with Radiologist; agreement verbalized.        Patient/Family was advised of these findings, precautions, recommendations and treatment options and has agreed to actively participate in planning and for this course of care.    Thank you for your referral. Please  co-sign or sign and return this letter via fax as soon as possible. If you have any questions, please contact me at [phone].    Alba Ruth MA/TAVON-SLP  Speech Language Pathologist  [facility]  [phone]    Electronically signed by therapist: ARTEMIO Oliveira  Physician's certification required: Yes  I certify the need for these services furnished under this plan of treatment and while under my care.    X___________________________________________________ Date____________________    Certification From: 4/18/2024  To:7/17/2024

## 2024-05-21 ENCOUNTER — HOSPITAL ENCOUNTER (OUTPATIENT)
Dept: INTERVENTIONAL RADIOLOGY/VASCULAR | Facility: HOSPITAL | Age: 79
Discharge: HOME | End: 2024-05-22
Attending: INTERNAL MEDICINE | Admitting: INTERNAL MEDICINE

## 2024-05-21 DIAGNOSIS — I20.0 UNSTABLE ANGINA (HCC): ICD-10-CM

## 2024-05-21 LAB
ATRIAL RATE: 72 BPM
GLUCOSE BLDC GLUCOMTR-MCNC: 118 MG/DL (ref 70–99)
GLUCOSE BLDC GLUCOMTR-MCNC: 96 MG/DL (ref 70–99)
ISTAT ACTIVATED CLOTTING TIME: 287 SECONDS (ref 125–137)
ISTAT ACTIVATED CLOTTING TIME: 320 SECONDS (ref 125–137)
P AXIS: 82 DEGREES
P-R INTERVAL: 150 MS
Q-T INTERVAL: 438 MS
QRS DURATION: 78 MS
QTC CALCULATION (BEZET): 455 MS
R AXIS: 16 DEGREES
T AXIS: 83 DEGREES
VENTRICULAR RATE: 65 BPM

## 2024-05-21 PROCEDURE — B2151ZZ FLUOROSCOPY OF LEFT HEART USING LOW OSMOLAR CONTRAST: ICD-10-PCS | Performed by: INTERNAL MEDICINE

## 2024-05-21 PROCEDURE — 027034Z DILATION OF CORONARY ARTERY, ONE ARTERY WITH DRUG-ELUTING INTRALUMINAL DEVICE, PERCUTANEOUS APPROACH: ICD-10-PCS | Performed by: INTERNAL MEDICINE

## 2024-05-21 PROCEDURE — B2111ZZ FLUOROSCOPY OF MULTIPLE CORONARY ARTERIES USING LOW OSMOLAR CONTRAST: ICD-10-PCS | Performed by: INTERNAL MEDICINE

## 2024-05-21 PROCEDURE — 4A023N7 MEASUREMENT OF CARDIAC SAMPLING AND PRESSURE, LEFT HEART, PERCUTANEOUS APPROACH: ICD-10-PCS | Performed by: INTERNAL MEDICINE

## 2024-05-21 PROCEDURE — B240ZZ3 ULTRASONOGRAPHY OF SINGLE CORONARY ARTERY, INTRAVASCULAR: ICD-10-PCS | Performed by: INTERNAL MEDICINE

## 2024-05-21 RX ORDER — AMLODIPINE BESYLATE 10 MG/1
10 TABLET ORAL DAILY
Status: DISCONTINUED | OUTPATIENT
Start: 2024-05-21 | End: 2024-05-22

## 2024-05-21 RX ORDER — HYDRALAZINE HYDROCHLORIDE 25 MG/1
TABLET, FILM COATED ORAL
Status: COMPLETED
Start: 2024-05-21 | End: 2024-05-21

## 2024-05-21 RX ORDER — NICOTINE POLACRILEX 4 MG
30 LOZENGE BUCCAL
Status: DISCONTINUED | OUTPATIENT
Start: 2024-05-21 | End: 2024-05-22

## 2024-05-21 RX ORDER — LIDOCAINE HYDROCHLORIDE 20 MG/ML
INJECTION, SOLUTION EPIDURAL; INFILTRATION; INTRACAUDAL; PERINEURAL
Status: COMPLETED
Start: 2024-05-21 | End: 2024-05-21

## 2024-05-21 RX ORDER — ALBUMIN (HUMAN) 12.5 G/50ML
25 SOLUTION INTRAVENOUS
Status: DISCONTINUED | OUTPATIENT
Start: 2024-05-22 | End: 2024-05-22

## 2024-05-21 RX ORDER — ACETAMINOPHEN 500 MG
500 TABLET ORAL DAILY PRN
Status: DISCONTINUED | OUTPATIENT
Start: 2024-05-21 | End: 2024-05-22

## 2024-05-21 RX ORDER — ASPIRIN 81 MG/1
81 TABLET ORAL DAILY
Status: DISCONTINUED | OUTPATIENT
Start: 2024-05-22 | End: 2024-05-22

## 2024-05-21 RX ORDER — ALBUTEROL SULFATE 90 UG/1
2 AEROSOL, METERED RESPIRATORY (INHALATION) EVERY 4 HOURS PRN
Status: DISCONTINUED | OUTPATIENT
Start: 2024-05-21 | End: 2024-05-22

## 2024-05-21 RX ORDER — ESCITALOPRAM OXALATE 5 MG/1
5 TABLET ORAL DAILY
Status: DISCONTINUED | OUTPATIENT
Start: 2024-05-22 | End: 2024-05-22

## 2024-05-21 RX ORDER — CLOPIDOGREL BISULFATE 75 MG/1
TABLET ORAL
Status: COMPLETED
Start: 2024-05-21 | End: 2024-05-21

## 2024-05-21 RX ORDER — PANTOPRAZOLE SODIUM 40 MG/1
40 TABLET, DELAYED RELEASE ORAL
Status: DISCONTINUED | OUTPATIENT
Start: 2024-05-22 | End: 2024-05-22

## 2024-05-21 RX ORDER — SODIUM CHLORIDE 9 MG/ML
INJECTION, SOLUTION INTRAVENOUS CONTINUOUS
Status: ACTIVE | OUTPATIENT
Start: 2024-05-21 | End: 2024-05-21

## 2024-05-21 RX ORDER — ASPIRIN 81 MG/1
324 TABLET, CHEWABLE ORAL ONCE
Status: DISCONTINUED | OUTPATIENT
Start: 2024-05-21 | End: 2024-05-21

## 2024-05-21 RX ORDER — ATORVASTATIN CALCIUM 40 MG/1
40 TABLET, FILM COATED ORAL NIGHTLY
Status: DISCONTINUED | OUTPATIENT
Start: 2024-05-21 | End: 2024-05-22

## 2024-05-21 RX ORDER — HYDRALAZINE HYDROCHLORIDE 20 MG/ML
10 INJECTION INTRAMUSCULAR; INTRAVENOUS EVERY 4 HOURS PRN
Status: DISCONTINUED | OUTPATIENT
Start: 2024-05-21 | End: 2024-05-22

## 2024-05-21 RX ORDER — HYDRALAZINE HYDROCHLORIDE 20 MG/ML
INJECTION INTRAMUSCULAR; INTRAVENOUS
Status: COMPLETED
Start: 2024-05-21 | End: 2024-05-21

## 2024-05-21 RX ORDER — CARVEDILOL 25 MG/1
25 TABLET ORAL 2 TIMES DAILY
Status: DISCONTINUED | OUTPATIENT
Start: 2024-05-21 | End: 2024-05-22

## 2024-05-21 RX ORDER — NICOTINE POLACRILEX 4 MG
15 LOZENGE BUCCAL
Status: DISCONTINUED | OUTPATIENT
Start: 2024-05-21 | End: 2024-05-22

## 2024-05-21 RX ORDER — HYDRALAZINE HYDROCHLORIDE 25 MG/1
25 TABLET, FILM COATED ORAL EVERY 8 HOURS SCHEDULED
Status: DISCONTINUED | OUTPATIENT
Start: 2024-05-21 | End: 2024-05-22

## 2024-05-21 RX ORDER — NITROGLYCERIN 20 MG/100ML
INJECTION INTRAVENOUS
Status: COMPLETED
Start: 2024-05-21 | End: 2024-05-21

## 2024-05-21 RX ORDER — CARVEDILOL 12.5 MG/1
TABLET ORAL
Status: COMPLETED
Start: 2024-05-21 | End: 2024-05-21

## 2024-05-21 RX ORDER — CLOPIDOGREL BISULFATE 75 MG/1
75 TABLET ORAL DAILY
Qty: 90 TABLET | Refills: 1 | Status: SHIPPED | OUTPATIENT
Start: 2024-05-21

## 2024-05-21 RX ORDER — DEXTROSE MONOHYDRATE 25 G/50ML
50 INJECTION, SOLUTION INTRAVENOUS
Status: DISCONTINUED | OUTPATIENT
Start: 2024-05-21 | End: 2024-05-22

## 2024-05-21 RX ORDER — HEPARIN SODIUM 1000 [USP'U]/ML
INJECTION, SOLUTION INTRAVENOUS; SUBCUTANEOUS
Status: COMPLETED
Start: 2024-05-21 | End: 2024-05-21

## 2024-05-21 RX ORDER — CLOPIDOGREL BISULFATE 75 MG/1
75 TABLET ORAL DAILY
Status: DISCONTINUED | OUTPATIENT
Start: 2024-05-22 | End: 2024-05-22

## 2024-05-21 RX ORDER — MIDAZOLAM HYDROCHLORIDE 1 MG/ML
INJECTION INTRAMUSCULAR; INTRAVENOUS
Status: COMPLETED
Start: 2024-05-21 | End: 2024-05-21

## 2024-05-21 RX ORDER — SODIUM CHLORIDE 9 MG/ML
INJECTION, SOLUTION INTRAVENOUS
Status: COMPLETED | OUTPATIENT
Start: 2024-05-21 | End: 2024-05-22

## 2024-05-21 RX ADMIN — AMLODIPINE BESYLATE 10 MG: 10 TABLET ORAL at 21:12:00

## 2024-05-21 RX ADMIN — HYDRALAZINE HYDROCHLORIDE 25 MG: 25 TABLET, FILM COATED ORAL at 21:12:00

## 2024-05-21 RX ADMIN — ACETAMINOPHEN 500 MG: 500 MG TABLET ORAL at 22:22:00

## 2024-05-21 RX ADMIN — CARVEDILOL 25 MG: 12.5 TABLET ORAL at 17:15:00

## 2024-05-21 RX ADMIN — HYDRALAZINE HYDROCHLORIDE 10 MG: 20 INJECTION INTRAMUSCULAR; INTRAVENOUS at 17:45:00

## 2024-05-21 RX ADMIN — SODIUM CHLORIDE: 9 INJECTION, SOLUTION INTRAVENOUS at 16:15:00

## 2024-05-21 RX ADMIN — ATORVASTATIN CALCIUM 40 MG: 40 TABLET, FILM COATED ORAL at 21:12:00

## 2024-05-21 RX ADMIN — HYDRALAZINE HYDROCHLORIDE 25 MG: 25 TABLET, FILM COATED ORAL at 17:08:00

## 2024-05-21 RX ADMIN — SODIUM CHLORIDE: 9 INJECTION, SOLUTION INTRAVENOUS at 11:45:00

## 2024-05-21 RX ADMIN — CARVEDILOL 25 MG: 25 TABLET ORAL at 17:15:00

## 2024-05-21 NOTE — H&P
Pre-cath H&P  formerly [facility]    Reason for Procedure  Abnormal stress test  VT    History of Present Illness:  Ollie Hernández is a a(n) 79 year old male with HTN, T2DM, HDL, KRAIG, ESRD on dialysis (R-AVF) who presents for ischemic evaluation after he was found to have runs of monomorphic NSVT on zio and abnormal nuclear stress test showing small mildly reversible defect of the apical-lateral and apical walls.    TTE: reviewed    Labs reviewed.      History:  Past Medical History:    Anemia    Asthma (HCC)    Back problem    BPH (benign prostatic hyperplasia)    Calculus of kidney    Cataract    Diabetes (HCC)    ESRD (end stage renal disease) on dialysis (HCC)    Essential hypertension    High blood pressure    High cholesterol    History of blood transfusion    Hyperlipidemia    Neuropathy    hands and feet    KRAIG on CPAP    Renal disorder    Sleep apnea    Visual impairment    glasses    Vocal cord paralysis, unilateral partial     Past Surgical History:   Procedure Laterality Date    Appendectomy      1981    Back surgery      Neck/back - 1998    Capsule endoscopy - internal referral      Cataract      12/2021 and 1/2022    Colonoscopy      Colonoscopy N/A 1/25/2021    Procedure: COLONOSCOPY;  Surgeon: Michael Gautam MD;  Location: [facility] ENDO    Hand/finger surgery unlisted      Accidental trauma    Upper gi endoscopy,diagnosis       Family History   Problem Relation Age of Onset    Cancer Father         Kidney    Breast Cancer Mother     Diabetes Mother     Diabetes Maternal Grandmother     Diabetes Maternal Grandfather     Heart Disorder Other         Uncle      reports that he quit smoking about 43 years ago. His smoking use included cigarettes. He started smoking about 60 years ago. He has a 17 pack-year smoking history. He has never used smokeless tobacco. He reports that he does not drink alcohol and does not use drugs.    Allergies:  Allergies   Allergen Reactions    Adhesive Tape OTHER (SEE  COMMENTS)     Severe rashes    Dust Mite Extract RASH       Medications:  No current facility-administered medications on file prior to encounter.     Current Outpatient Medications on File Prior to Encounter   Medication Sig Dispense Refill    linaGLIPtin (TRADJENTA) 5 mg Oral Tab Take 1 tablet (5 mg total) by mouth daily. 90 tablet 0    carvedilol 25 MG Oral Tab Take 1 tablet (25 mg total) by mouth 2 (two) times daily.      atorvastatin 40 MG Oral Tab Take 1 tablet (40 mg total) by mouth nightly. 90 tablet 3    acetaminophen 500 MG Oral Tab Take 1 tablet (500 mg total) by mouth daily as needed for Pain.      felodipine ER 10 MG Oral Tablet 24 Hr Take 1 tablet (10 mg total) by mouth daily. 90 tablet 3    aspirin 81 MG Oral Tab Take 1 tablet (81 mg total) by mouth daily.      escitalopram 5 MG Oral Tab Take 1 tablet (5 mg total) by mouth daily. 90 tablet 3    LANTUS SOLOSTAR 100 UNIT/ML Subcutaneous Solution Pen-injector Inject 16-18 Units into the skin nightly. (Patient not taking: Reported on 5/16/2024) 15 mL 1    apixaban 5 MG Oral Tab Take 1 tablet (5 mg total) by mouth 2 (two) times daily.      metoprolol tartrate 50 MG Oral Tab Take 1 tablet (50 mg total) by mouth 2 (two) times daily. (Patient not taking: Reported on 5/16/2024)      cetirizine 10 MG Oral Tab Take 1 tablet (10 mg total) by mouth every other day.      Cholecalciferol 125 MCG (5000 UT) Oral Tab Take 1 tablet (5,000 Units total) by mouth 2 (two) times daily.      cyanocobalamin 100 MCG Oral Tab Take 2.5 tablets (250 mcg total) by mouth every other day.      polyethylene glycol, PEG 3350, 17 g Oral Powd Pack 17 g Wed, Thurs, Sat      psyllium 28 % Oral Powd Pack Mon-Tue-Fri      tetrahydrozoline 0.05 % Ophthalmic Solution 1 drop 2 (two) times daily as needed.      traMADol 50 MG Oral Tab Take 1 tablet (50 mg total) by mouth every 12 (twelve) hours as needed for Pain.      metoprolol tartrate 50 MG Oral Tab Take 1 tablet (50 mg total) by mouth 2  (two) times daily. (Patient not taking: Reported on 5/16/2024) 60 tablet 1    Glucose Blood (CONTOUR NEXT TEST) In Vitro Strip Test 3 times daily 300 each 1    fluticasone propionate 50 MCG/ACT Nasal Suspension 2 sprays by Nasal route daily. 48 g 3    pantoprazole 40 MG Oral Tab EC Take 1 tablet (40 mg total) by mouth every morning before breakfast. 90 tablet 3    methocarbamol 500 MG Oral Tab Take 1 tablet (500 mg total) by mouth 2 (two) times daily as needed. 60 tablet 1    Insulin Pen Needle (PEN NEEDLES) 32G X 4 MM Does not apply Misc 1 each daily. 100 each 0    albuterol (PROAIR HFA) 108 (90 Base) MCG/ACT Inhalation Aero Soln Inhale 2 puffs into the lungs every 4 (four) hours as needed for Wheezing. 3 each 3    Budesonide-Formoterol Fumarate (SYMBICORT) 160-4.5 MCG/ACT Inhalation Aerosol Inhale 2 puffs into the lungs 2 (two) times daily. (Patient taking differently: Inhale 2 puffs into the lungs 2 (two) times daily as needed.) 3 each 3    Darbepoetin Randell 60 MCG/ML Injection Solution Inject into the vein as needed.         Review of Systems:  Constitutional: denies fevers, chills, night sweats  HEENT: denies headache, vision changes, trouble or pain with swallowing  Cardiac: denies chest pain, palpitations, edema  Pulm: denies dyspnea, cough, wheeze  GI: denies n/v, abd pain, diarrhea or constipation  : denies hematuria, dysuria, incontinence  MSK: denies muscle or joint pains  Neuro: denies numbness, weakness, paresthesias  Psych: denies anxiety, depression  Integument: denies skin rashes or lesions  Heme: denies easy bruising or bleeding  Endo: denies heat/cold intolerance, skin or nail changes      Physical Exam:  Blood pressure (!) 178/64, pulse 63, temperature 97.4 °F (36.3 °C), temperature source Temporal, resp. rate 22, height 5' 3.5\" (1.613 m), weight 170 lb (77.1 kg), SpO2 98%.  Wt Readings from Last 3 Encounters:   05/16/24 170 lb (77.1 kg)   04/02/24 171 lb (77.6 kg)   02/29/24 169 lb (76.7 kg)        General: awake, alert, oriented x 3, no acute distress  HEENT: at/nc, perrl, eomi  Neck: No JVD, carotids 2+ no bruits.  Cardiac: Regular rate and rhythm, S1, S2 normal, no murmur, rub or gallop.  Lungs: Clear without wheezes, rales, rhonchi or dullness.  Normal excursions and effort.  Abdomen: Soft, non-tender, non-distended, normal bowel sounds   Extremities: Without clubbing, cyanosis or edema.  Peripheral pulses are 2+.  Neurologic: Alert and oriented, normal affect.  Psych: normal mood and affect  Skin: Warm and dry.     Labs:   CBC:    Lab Results   Component Value Date    WBC 6.4 11/20/2023    WBC 6.7 08/05/2023    WBC 9.3 07/25/2023     Lab Results   Component Value Date    HGB 11.1 (L) 11/20/2023    HGB 10.3 (L) 08/05/2023    HGB 11.8 (L) 07/25/2023      Lab Results   Component Value Date    .0 11/20/2023    .0 08/05/2023    .0 07/25/2023     BMP:   No results found for: \"GLUCOSE\"  Lab Results   Component Value Date    K 3.8 11/20/2023    K 4.3 08/05/2023    K 4.7 07/25/2023     Lab Results   Component Value Date    BUN 39 (H) 11/20/2023    BUN 41 (H) 08/05/2023    BUN 37 (H) 07/25/2023     Lab Results   Component Value Date    CREATSERUM 6.30 (H) 11/20/2023    CREATSERUM 6.04 (H) 08/05/2023    CREATSERUM 5.60 (H) 07/25/2023     Cholesterol:     Lab Results   Component Value Date    CHOLEST 170 06/23/2023    CHOLEST 157 11/23/2022    CHOLEST 168 08/31/2022     Lab Results   Component Value Date    HDL 43 06/23/2023    HDL 36 (L) 11/23/2022    HDL 43 08/31/2022     Lab Results   Component Value Date    TRIG 132 06/23/2023    TRIG 143 11/23/2022    TRIG 109 08/31/2022     Lab Results   Component Value Date     (H) 06/23/2023    LDL 96 11/23/2022     (H) 08/31/2022     Lab Results   Component Value Date    AST 31 08/05/2023    AST 27 07/25/2023    AST 30 06/23/2023     Lab Results   Component Value Date    ALT 32 08/05/2023    ALT 24 07/25/2023    ALT 28 06/23/2023        Assessment/Plan:  Proceed with LHC/PCI. R-femoral artery approach.    Luis Orozco MD  Interventional Cardiology  [place]  5/21/2024  1:08 PM

## 2024-05-21 NOTE — IVS NOTE
Hand-Off     Procedure hand off report given to Manuela JERRY.   Pt's vital signs are stable.   Procedural access site is dry and intact with no signs or symptoms of bleeding or hematoma.   Dr. Orozco spoke with patient/family post procedure.     Stent card given to pt's wife. Cardiac rehab and dietitian called. Called pt's pharmacy to verify plavix is available.

## 2024-05-21 NOTE — DISCHARGE INSTRUCTIONS
HOME CARE INSTRUCTIONS FOLLOWING CORONARY ANGIOGRAPHY, ANGIOPLASTY (PTCA/PTA) OR INSERTION OF STENT IN THE CORONARY      Activity    DO NOT drive after the procedure.  You may resume driving late the following day according to the nurse or physician's instructions  Plan on resting and relaxing tonight and tomorrow  Avoid drinking alcohol for the next 24 hours  Resume your normal activity after 48 hours, or as instructed by your physician  Do not lift anything over 10 pounds for 1 week  Avoid repeated stair use and excessive walking for the next 24 hours    What is Normal?    A small lump at the procedure site associated with mild tenderness when touched  The procedure site may be bruised or discolored  There may be a small amount of drainage on the bandage    Special Instructions     Drink plenty of fluids during the next 24 hours to \"flush\" the contrast from your system  Keep the bandage clean and dry  After 24 hours, remove the bandage  You may shower after removal of the bandage, wash the procedure site gently with soap and water  If you choose to wear a bandage for a few days, make sure it remains clean and dry and that it is changed daily  DO NOT submerge the procedure site for 1 week (no bath tubs or pools)    When you should NOTIFY YOUR PHYSICIAN    Bleeding can occur at the procedure site - both on the outside of the skin and/or beneath the surface of the skin  Swelling or a large lump at the procedure site can occur, which may be accompanied by moderate to severe pain    If either of the above occurs, lie down flat.  Have someone apply pressure to the procedure site with both hands, as instructed by the nurse.  Hold pressure for 20 minutes and the bleeding should stop.  Notify your physician of the occurrence  If the bleeding does not stop, call 911 and continue to apply pressure    If you experience signs of a fever, temperature > 101°, chills, infection (redness, swelling, thick yellow drainage, or a foul  odor from the procedure site)  If you notice any numbness, tingling, or loss of feeling to your leg or foot or groin access      If You Received a Stent:    You will remain on an antiplatelet drug and/or aspirin.  Antiplatelet medications are usually taken for six months to one year and should not be stopped unless your cardiologist directs you to do so.  These medications help to prevent blockage at the stent site.  If another physician or dentist asks you to stop your antiplatelet medication, you need to consult your cardiologist first.  Together, your cardiologist and other physician can discuss the risks that may be involved if you are not taking the antiplatelet medication   If an MRI is necessary, it may be done 4-6 weeks after your procedure.  Verify this with your cardiologist  Keep your stent card with you at all times!  If you need an MRI in the future, your stent card will need to be shown to the technologist before performing the MRI.  A duplicate card CANNOT be reproduced.  PER DR GILLIS: take aspirin, plavix, and eliquis for next 7 days. After 7 days, take plavix and eliquis, but not aspirin. After 6 months, take aspirin and eliquis, no plavix. Keep in mind that this regimen may change based on follow up appointments.     Other    You may resume your present diet, unless otherwise specified by your physician.  A list of your medications was provided to you at discharge.  Please call your physician’s office for a follow-up appointment.  You should be seen in 2 weeks.    Additional Instruction  Closure device utilized?  Yes  If yes, type of closure used: Perclose  See appropriate pamphlet information      If you have any question or concern, please call the on-call nurse at [phone]       -Televisit with Dr. Simmons 5/24 at 11am to discuss blood sugars  -stop all insulin for now; call the doctor on call if blood sugar >250  -eliquis, plavix and aspirin for 1 week, and then after the 1 week, stop aspirin  and continue plavix and eliquis.         Stop

## 2024-05-21 NOTE — PROCEDURES
[facility] Cardiology  Cardiac Catheterization Report    (S): Luis Orozco MD    PREOPERATIVE DIAGNOSIS: Abnormal nuclear stress test    POSTOPERATIVE DIAGNOSIS: Coronary artery disease of native vessels    PROCEDURE PERFORMED: Left heart catheterization with selective coronary arteriography, left ventriculography, and right common femoral arteriography.     CONSENT: The risks, benefits, and alternatives of cardiac catheterization were discussed with the patient.  The risks included, but were not limited to: bleeding, limb ischemia, deep venous thrombosis, allergic reaction, infection, stroke, myocardial infarction,  and death.  Benefits of the procedure included: symptomatic improvement, diagnosis of heart disease and prevention of myocardial infarction.  Alternatives to the procedure included: not performing cardiac catheterization, treatment with medications only, and observation. The patient verbalized understanding and agreed to proceed with the procedure.     ACCESS: 6 Turkmen right femoral artery    SEDATION: 3 mg Versed; 100 mcg Fentanyl    CATHETERS: 6 Turkmen JL 3.5; JR4, EBU 3.5 Guide    VASCULAR CLOSURE: Perclose     DESCRIPTION OF PROCEDURE:  The patient was brought to the cardiac catheterization laboratory.  Bilateral groins were prepped and draped with sterile technique.  The right femoral head was identified with fluoroscopy. 10 mL of 1% lidocaine were injected into the right groin for local anesthesia.      Once local anesthesia was achieved, sedation was administered. The IV was maintained by the RN and moderate conscious sedation with Versed and Fentanyl was given. The patient was assessed and monitoring of oxygen, heart rate and blood pressure by nurse and myself during the exam 1440 (time of 1st dose administered when I was present) to 1559 (procedure end time).     Once adequate sedation was achieved, a micropuncture sheath was inserted into the right common femoral artery  using a modified Seldinger technique.  Its position was confirmed by fluoroscopy.  This was then exchanged for a 6-Sinhala sheath.  Angiography was performed using the aforementioned catheters.  The coronary ostia were noted to have normal origins from the aorta.  A left ventriculogram was performed in the VERNON position.  A right common femoral arteriogram was performed via an injection of contrast through the sheath.  The sheath was removed. Hemostasis was achieved.  No complications were noted at the end of the procedure.     FINDINGS:      HEMODYNAMICS:    The left ventricular end diastolic pressure is 7 mmHg. There was no significant gradient upon pullback across the aortic valve.     ANGIOGRAPHY:    Left Main: Large vessel bifurcating into the LAD and circumflex.  20% distal disease.    LAD: Large vessel that gives off a large diagonal branch and wraps the apex to supply a portion of the inferior wall.  There is a 70% lesion of the apical LAD as it wraps the apex.    Lcx: Large vessel that gives off a large OM1 branch.  There is a discrete 80% lesion of the circumflex just proximal to the OM1 takeoff.    RCA: Small vessel that gives off the PDA and RPL branches.    Right common femoral arteriography shows no significant stenoses involving the right common femoral artery. A sheath is seen inserting into the right common femoral artery superior to the femoral bifurcation, but inferior to the pelvic rim.    CONCLUSIONS:  Coronary artery disease as described above.  Successful IVUS guided PCI of the circumflex with a [place] frontier 2.75 mm x 15 mm ABIMBOLA postdilated to 3.0 mm distally and 3.5 mm proximally.  Conversion of stenosis from 80% to 0%.  DORIS-3 flow pre and postintervention.    RECOMMENDATION:  Triple therapy with aspirin 81 mg, Plavix 75 mg, Eliquis 5 mg for 1 week followed by dual antithrombotic therapy with Plavix 75 mg plus Eliquis 5 mg for 6 months.  Medical management of distal LAD lesion as it is  suboptimally located for PCI.  GDMT per ACC/AHA medical guidelines.  Bed rest for 3 hours    Luis Orozco MD  Interventional Cardiology, [place]  5/21/2024  4:03 PM

## 2024-05-22 VITALS
SYSTOLIC BLOOD PRESSURE: 136 MMHG | BODY MASS INDEX: 26.62 KG/M2 | OXYGEN SATURATION: 99 % | HEART RATE: 70 BPM | TEMPERATURE: 98 F | HEIGHT: 63.5 IN | WEIGHT: 152.13 LBS | DIASTOLIC BLOOD PRESSURE: 64 MMHG | RESPIRATION RATE: 20 BRPM

## 2024-05-22 PROBLEM — I20.0 UNSTABLE ANGINA (HCC): Status: ACTIVE | Noted: 2024-05-22

## 2024-05-22 PROBLEM — I20.0 UNSTABLE ANGINA (HCC): Status: ACTIVE | Noted: 2024-01-01

## 2024-05-22 LAB
ANION GAP SERPL CALC-SCNC: 8 MMOL/L (ref 0–18)
BUN BLD-MCNC: 49 MG/DL (ref 9–23)
BUN/CREAT SERPL: 8.6 (ref 10–20)
CALCIUM BLD-MCNC: 9.7 MG/DL (ref 8.7–10.4)
CHLORIDE SERPL-SCNC: 100 MMOL/L (ref 98–112)
CO2 SERPL-SCNC: 30 MMOL/L (ref 21–32)
CREAT BLD-MCNC: 5.72 MG/DL
DEPRECATED RDW RBC AUTO: 54.8 FL (ref 35.1–46.3)
EGFRCR SERPLBLD CKD-EPI 2021: 10 ML/MIN/1.73M2 (ref 60–?)
ERYTHROCYTE [DISTWIDTH] IN BLOOD BY AUTOMATED COUNT: 17.3 % (ref 11–15)
GLUCOSE BLD-MCNC: 194 MG/DL (ref 70–99)
GLUCOSE BLDC GLUCOMTR-MCNC: 119 MG/DL (ref 70–99)
GLUCOSE BLDC GLUCOMTR-MCNC: 171 MG/DL (ref 70–99)
HCT VFR BLD AUTO: 33.8 %
HGB BLD-MCNC: 11.5 G/DL
MCH RBC QN AUTO: 30.6 PG (ref 26–34)
MCHC RBC AUTO-ENTMCNC: 34 G/DL (ref 31–37)
MCV RBC AUTO: 89.9 FL
OSMOLALITY SERPL CALC.SUM OF ELEC: 304 MOSM/KG (ref 275–295)
PLATELET # BLD AUTO: 203 10(3)UL (ref 150–450)
POTASSIUM SERPL-SCNC: 4.3 MMOL/L (ref 3.5–5.1)
RBC # BLD AUTO: 3.76 X10(6)UL
SODIUM SERPL-SCNC: 138 MMOL/L (ref 136–145)
WBC # BLD AUTO: 5.6 X10(3) UL (ref 4–11)

## 2024-05-22 PROCEDURE — 99223 1ST HOSP IP/OBS HIGH 75: CPT | Performed by: INTERNAL MEDICINE

## 2024-05-22 RX ORDER — HYDRALAZINE HYDROCHLORIDE 25 MG/1
25 TABLET, FILM COATED ORAL EVERY 8 HOURS SCHEDULED
Qty: 90 TABLET | Refills: 1 | Status: SHIPPED | OUTPATIENT
Start: 2024-05-22

## 2024-05-22 RX ADMIN — HYDRALAZINE HYDROCHLORIDE 25 MG: 25 TABLET, FILM COATED ORAL at 15:38:00

## 2024-05-22 RX ADMIN — CARVEDILOL 25 MG: 25 TABLET ORAL at 08:59:00

## 2024-05-22 RX ADMIN — ESCITALOPRAM OXALATE 5 MG: 5 TABLET ORAL at 08:59:00

## 2024-05-22 RX ADMIN — CLOPIDOGREL BISULFATE 75 MG: 75 TABLET ORAL at 08:58:00

## 2024-05-22 RX ADMIN — HYDRALAZINE HYDROCHLORIDE 25 MG: 25 TABLET, FILM COATED ORAL at 05:12:00

## 2024-05-22 RX ADMIN — ASPIRIN 81 MG: 81 TABLET ORAL at 08:59:00

## 2024-05-22 RX ADMIN — PANTOPRAZOLE SODIUM 40 MG: 40 TABLET, DELAYED RELEASE ORAL at 05:12:00

## 2024-05-22 NOTE — PLAN OF CARE
VSS overnight. No cath site complications. Tylenol given for back pain. Plan dialysis today.    Problem: Patient Centered Care  Goal: Patient preferences are identified and integrated in the patient's plan of care  Description: Interventions:  - What would you like us to know as we care for you? From home with wife.  - Provide timely, complete, and accurate information to patient/family  - Incorporate patient and family knowledge, values, beliefs, and cultural backgrounds into the planning and delivery of care  - Encourage patient/family to participate in care and decision-making at the level they choose  - Honor patient and family perspectives and choices  Outcome: Progressing     Problem: Diabetes/Glucose Control  Goal: Glucose maintained within prescribed range  Description: INTERVENTIONS:  - Monitor Blood Glucose as ordered  - Assess for signs and symptoms of hyperglycemia and hypoglycemia  - Administer ordered medications to maintain glucose within target range  - Assess barriers to adequate nutritional intake and initiate nutrition consult as needed  - Instruct patient on self management of diabetes  Outcome: Progressing

## 2024-05-22 NOTE — PLAN OF CARE
Patient has been medically cleared by team.Patient had hemodialysis today 1.3 L removed. IV and tele monitor has been removed. Discharge instructions reviewed with patient and wife at bedside all questions have been answered.

## 2024-05-22 NOTE — IVS NOTE
Bedside Hand-Off     Procedural access site is dry and intact with no signs or symptoms of bleeding or hematoma.   Transferred with belongings to [facility]

## 2024-05-22 NOTE — PROGRESS NOTES
[facility]  part of [facility]    Progress Note    Ollie Hernández Patient Status:  Observation    1945 MRN [id number]   Location [facility] 3W/SW Attending Herman Phelan MD   Hosp Day # 0 PCP Wili Parmar MD       SUBJECTIVE:  Reports overall feeling well; generally feels weak but waking up this morning, he felt less tired than he has been for several months    OBJECTIVE:  /75 (BP Location: Left arm)   Pulse 71   Temp 97.5 °F (36.4 °C) (Oral)   Resp 20   Ht 5' 3.5\" (1.613 m)   Wt 152 lb 1.6 oz (69 kg)   SpO2 99%   BMI 26.52 kg/m²     Intake/Output:  I/O last 3 completed shifts:  In: 400 [P.O.:400]  Out: -     Wt Readings from Last 3 Encounters:   24 152 lb 1.6 oz (69 kg)   24 171 lb (77.6 kg)   24 169 lb (76.7 kg)       Exam  Gen: No acute distress, alert and oriented x3  Pulm: Lungs CTAB, no rhonchi or wheezing  CV: Heart RRR, no murmurs   Abd: Abdomen soft, nontender, nondistended, no organomegaly, bowel sounds present  EXT: no BLE edema  R groin-dress clean/dry; no hematoma    Labs:   Recent Labs   Lab 24  0919   RBC 3.76*   HGB 11.5*   HCT 33.8*   MCV 89.9   MCH 30.6   MCHC 34.0   RDW 17.3*   WBC 5.6   .0         Recent Labs   Lab 24  0919   *   BUN 49*   CREATSERUM 5.72*   EGFRCR 10*   CA 9.7      K 4.3      CO2 30.0         Imaging:  No results found.          Assessment and Plan:     Abnormal stress test  Pt underwent LHC 2024 with Dr. Luis Orozco: 70% lesion of apical LAD, 80% lesion of LCx  S/p PCI of LCx  Plan for aspirin, plavix and eliquis for 1 week, then plavix plus eliquis  Medical management of LAD    Pharyngeal dysphagia  Does have right side hypomobile oropharyngeal dysphagia  - swallow study 2024: intermittent shallow and deep laryngeal penetration without aspiration, small zenker's diverticulum, bulky endplate osteophytes contributing to dysphagia  - Will see Dr. Del Angel for  further imaging     Paroxysmal A-fib  No prior diagnosis, reports that he was being seen in the outpatient clinic for 4 dialysis access concerns.  He was told he had A-fib and was advised to go to the emergency department, though he was sent over to [facility].  - Seen by Dr. Phelan 10/9, abnormal 5-day Zio patch.  Eliquis for anticoagulation  - We will continue with carvedilol in the meantime  - Seen by Dr. Phelan 1/25/2024: Mildly abnormal nuclear stress test in presence of VT      Muscle cramping  Worse at nighttime sometimes experiences it after hemodialysis.  Poss related to fluid shifts, relative dehydration, electrolyte imbalance.  Lower suspicion for restless leg syndrome       Cervical radiculopathy  Chronic back pain with neuropathy  Has pain in the neck, back, resulting rate sided sciatica based on examination.  He does have some mild lower extremity decree sensation to temperature.  Multifactorial from degenerative disc disease, advanced osteoarthritis resulting in lumbar and cervical radiculopathy.  Does have some neuropathy from his history of diabetes  -Concern for unsteadiness, impaired gait with the need for use of cane.  -we will manage conservatively for now: Tylenol, aspirin as he has been taking.  Hesitant to use a muscle relaxer as this may increase his risk for falls  -I did review last CT scan of the cervical neck 7/2019 with history of cervical decompressive laminectomy with multilevel degenerative disc disease  - He did complete physical therapy in October  -I am recommending an MRI of the brain and cervical neck to determine if this is a more proximal cause of his sciatica.       Dizziness versus vertigo  Reports dizziness that is worsening over the last few weeks.  This is causing unsteadiness, imbalance of gait.  Uses a cane at baseline and has not experienced any mechanical falls at home  - On examination, he does have an unsteady gait that is improved with the use of a cane.  He  does have some decrease sensation in the lower extremities, possibly related to known lumbar spinal stenosis with sciatica  - We will check MRI brain - 12/29/2022 moderate chronic microvascular ischemic changes bilateral cerebral hemispheres.  Chronic microvascular ischemia in the basal ganglionic lesions and Alamine  - Seems to have remained stable     Chronic diastolic heart failure  -Last seen by cardiology, Dr. Jolly.  Maintained on current home medications.  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   Foot exam: Check feet daily  - Gabapentin for neuropathy  - Nutrition optimization recommended  - Dexcom - started     ESRD on hemodialysis  Possibly related to prolonged history of diabetes and hypertension  - Creatinine on last check 6.30, EGFR 9  - Follows with Dr. Martinez     Hypertension  - Continue with home carvedilol 25 mg twice a day, hydralazine 25mg q8h, amlodipine 10mg daily     Hyperlipidemia  - Continue with On atorvastatin 40 mg daily, aspirin     BPH  -Currently stable on tamsulosin 0.4 mg daily, trospium 20 mg twice a day     Pulmonary hypertension  -Comanagement of KRAIG and chronic diastolic heart failure  - Seems to be stable     KRAIG on CPAP  -Needs new CPAP  - Referral to pulm - Dr. Landeros, last seen 12/14/2023     Cataracts  -Seems to be stable, follows with Dr. Chase of ophthalmology     Gout  Flareup in 6/2022. NO recurrence  -Seems to be stable     Anemia of chronic disease  Status post IV Venofer, last dose 5/9/2022  -Baseline hemoglobin seems to  be around 9-10  - Repeat CBC with hemoglobin stable at 9.6 per Care Everywhere  - Iron studies within normal limits.     Sensorineural hearing loss  Mild-moderate per audiology testing/14/2022.  He may be a hearing aid candidate in the future  -Remained stable at this time     Unsteady Gait  Ongoing fatigue due to multiple comorbidities as above  - We will request DME for a new walker, 2 wheeled-2 constipation.  Does not want a rollator walker at this time  - He is a fall risk with his unsteady gait, fatigue.  - This will allow adequate support to allow for improvement of ADLs, improvement in quality of life.     Anxiety  Lexapro 5 mg once a day.      Disposition pending cardiology recs; hopefully home today    Ebonie Simmons DO  5/22/2024  4:59 AM

## 2024-05-22 NOTE — DISCHARGE SUMMARY
Discharge Summary     Ollie Hernández Patient Status:  Outpatient in a Bed    1945 MRN [id number]   Location [facility] 3W/SW Attending Herman Phelan MD   Hosp Day # 0 PCP Wili Parmar MD     Date of Admission: 2024  Date of Discharge: 2024  Discharge Disposition: Home or Self Care  Discharge Diagnosis: CAD    History of Present Illness: Pt had abnormal stress test and underwent left heart cath,             Brief Synopsis:     Abnormal stress test  Pt underwent LHC 2024 with Dr. Luis Orozco: 70% lesion of apical LAD, 80% lesion of LCx  S/p PCI of LCx  Plan for aspirin, plavix and eliquis for 1 week, then plavix plus eliquis  Medical management of LAD     Pharyngeal dysphagia  Does have right side hypomobile oropharyngeal dysphagia  - swallow study 2024: intermittent shallow and deep laryngeal penetration without aspiration, small zenker's diverticulum, bulky endplate osteophytes contributing to dysphagia  - Will see Dr. Del Angel for further imaging     Paroxysmal A-fib  No prior diagnosis, reports that he was being seen in the outpatient clinic for 4 dialysis access concerns.  He was told he had A-fib and was advised to go to the emergency department, though he was sent over to [facility].  - Seen by Dr. Phelan 10/9, abnormal 5-day Zio patch.  Eliquis for anticoagulation  - We will continue with carvedilol in the meantime  - Seen by Dr. Phelan 2024: Mildly abnormal nuclear stress test in presence of VT      Muscle cramping  Worse at nighttime sometimes experiences it after hemodialysis.  Poss related to fluid shifts, relative dehydration, electrolyte imbalance.  Lower suspicion for restless leg syndrome        Cervical radiculopathy  Chronic back pain with neuropathy  Has pain in the neck, back, resulting rate sided sciatica based on examination.  He does have some mild lower extremity decree sensation to temperature.  Multifactorial from degenerative disc disease,  advanced osteoarthritis resulting in lumbar and cervical radiculopathy.  Does have some neuropathy from his history of diabetes  -Concern for unsteadiness, impaired gait with the need for use of cane.  -we will manage conservatively for now: Tylenol, aspirin as he has been taking.  Hesitant to use a muscle relaxer as this may increase his risk for falls  -I did review last CT scan of the cervical neck 7/2019 with history of cervical decompressive laminectomy with multilevel degenerative disc disease  - He did complete physical therapy in October  -I am recommending an MRI of the brain and cervical neck to determine if this is a more proximal cause of his sciatica.       Dizziness versus vertigo  Reports dizziness that is worsening over the last few weeks.  This is causing unsteadiness, imbalance of gait.  Uses a cane at baseline and has not experienced any mechanical falls at home  - On examination, he does have an unsteady gait that is improved with the use of a cane.  He does have some decrease sensation in the lower extremities, possibly related to known lumbar spinal stenosis with sciatica  - We will check MRI brain - 12/29/2022 moderate chronic microvascular ischemic changes bilateral cerebral hemispheres.  Chronic microvascular ischemia in the basal ganglionic lesions and Alamine  - Seems to have remained stable     Chronic diastolic heart failure  -Last seen by cardiology, Dr. Jolly.  Maintained on current home medications.  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   Foot exam: Check feet daily  - Gabapentin for neuropathy  - Nutrition optimization recommended  - Dexcom - started  Not requiring much insulin while hospitalized; pt to follow up on 5/24 with blood sugar readings.      ESRD on hemodialysis  Possibly related to prolonged history of diabetes and hypertension  - Creatinine on last check 6.30, EGFR 9  - Follows with Dr. Martinez     Hypertension  - Continue with home carvedilol 25 mg twice a day, hydralazine 25mg q8h, amlodipine 10mg daily     Hyperlipidemia  - Continue with On atorvastatin 40 mg daily, aspirin     BPH  -Currently stable on tamsulosin 0.4 mg daily, trospium 20 mg twice a day     Pulmonary hypertension  -Comanagement of KRAIG and chronic diastolic heart failure  - Seems to be stable     KRAIG on CPAP  -Needs new CPAP  - Referral to pulm - Dr. Landeros, last seen 12/14/2023     Cataracts  -Seems to be stable, follows with Dr. Chase of ophthalmology     Gout  Flareup in 6/2022. NO recurrence  -Seems to be stable     Anemia of chronic disease  Status post IV Venofer, last dose 5/9/2022  -Baseline hemoglobin seems to be around 9-10  - Repeat CBC with hemoglobin stable at 9.6 per Care Everywhere  - Iron studies within normal limits.     Sensorineural hearing loss  Mild-moderate per audiology testing/14/2022.  He may be a hearing aid candidate in the future  -Remained stable at this time     Unsteady Gait  Ongoing fatigue due to multiple comorbidities as above  - We will request DME for a new walker, 2 wheeled-2 constipation.  Does not want a rollator walker at this time  - He is a fall risk with his unsteady gait, fatigue.  - This will allow adequate support to allow for improvement of ADLs, improvement in quality of life.     Anxiety  Lexapro 5 mg once a day.           Lace+ Score: 58  59-90 High Risk  29-58 Medium Risk  0-28   Low Risk       TCM Follow-Up Recommendation:  LACE > 58: High Risk of readmission after discharge from the  hospital.    Procedures during hospitalization:   [facility]    Incidental or significant findings and recommendations (brief descriptions):  none    Lab/Test results pending at Discharge:   none    Consultants:  cardiology    Discharge Medication List:     Discharge Medications        START taking these medications        Instructions Prescription details   clopidogrel 75 MG Tabs  Commonly known as: Plavix      Take 1 tablet (75 mg total) by mouth daily.   Quantity: 90 tablet  Refills: 1     hydrALAZINE 25 MG Tabs  Commonly known as: Apresoline      Take 1 tablet (25 mg total) by mouth every 8 (eight) hours.   Quantity: 90 tablet  Refills: 1            CONTINUE taking these medications        Instructions Prescription details   acetaminophen 500 MG Tabs  Commonly known as: Tylenol Extra Strength  Notes to patient: Take 1 tablet (500 mg total) by mouth daily as needed for Pain.      Take 1 tablet (500 mg total) by mouth daily as needed for Pain.   Refills: 0     albuterol 108 (90 Base) MCG/ACT Aers  Commonly known as: ProAir HFA      Inhale 2 puffs into the lungs every 4 (four) hours as needed for Wheezing.   Quantity: 3 each  Refills: 3     apixaban 5 MG Tabs  Commonly known as: Eliquis  Notes to patient: Take 1 tablet (5 mg total) by mouth 2 (two) times daily.      Take 1 tablet (5 mg total) by mouth 2 (two) times daily.   Refills: 0     aspirin 81 MG Tabs  Notes to patient: Take 1 tablet (81 mg total) by mouth daily.      Take 1 tablet (81 mg total) by mouth daily.   Refills: 0     atorvastatin 40 MG Tabs  Commonly known as: Lipitor      Take 1 tablet (40 mg total) by mouth nightly.   Quantity: 90 tablet  Refills: 3     Budesonide-Formoterol Fumarate 160-4.5 MCG/ACT Aero  Commonly known as: Symbicort      Inhale 2 puffs into the lungs 2 (two) times daily.   Quantity: 3 each  Refills: 3     carvedilol 25 MG Tabs  Commonly known as: Coreg  Notes to patient: Take 1 tablet (25 mg total) by mouth 2 (two) times daily.       Take 1 tablet (25 mg total) by mouth 2 (two) times daily.   Refills: 0     cetirizine 10 MG Tabs  Commonly known as: ZyrTEC  Notes to patient: Take 1 tablet (10 mg total) by mouth every other day.      Take 1 tablet (10 mg total) by mouth every other day.   Refills: 0     Cholecalciferol 125 MCG (5000 UT) Tabs  Commonly known as: VITAMIN D-3  Notes to patient: Take 1 tablet (5,000 Units total) by mouth 2 (two) times daily.      Take 1 tablet (5,000 Units total) by mouth 2 (two) times daily.   Refills: 0     Contour Next Test Strp  Generic drug: Glucose Blood      Test 3 times daily   Quantity: 300 each  Refills: 1     cyanocobalamin 100 MCG Tabs  Commonly known as: Vitamin B12  Notes to patient: Take 2.5 tablets (250 mcg total) by mouth every other day.      Take 2.5 tablets (250 mcg total) by mouth every other day.   Refills: 0     Darbepoetin Randell 60 MCG/ML Soln  Notes to patient: Inject into the vein as needed.      Inject into the vein as needed.   Refills: 0     escitalopram 5 MG Tabs  Commonly known as: Lexapro      Take 1 tablet (5 mg total) by mouth daily.   Quantity: 90 tablet  Refills: 3     felodipine ER 10 MG Tb24  Commonly known as: Plendil      Take 1 tablet (10 mg total) by mouth daily.   Quantity: 90 tablet  Refills: 3     fluticasone propionate 50 MCG/ACT Susp  Commonly known as: Flonase      2 sprays by Nasal route daily.   Quantity: 48 g  Refills: 3     methocarbamol 500 MG Tabs  Commonly known as: Robaxin      Take 1 tablet (500 mg total) by mouth 2 (two) times daily as needed.   Quantity: 60 tablet  Refills: 1     pantoprazole 40 MG Tbec  Commonly known as: Protonix      Take 1 tablet (40 mg total) by mouth every morning before breakfast.   Quantity: 90 tablet  Refills: 3     Pen Needles 32G X 4 MM Misc      1 each daily.   Quantity: 100 each  Refills: 0     polyethylene glycol (PEG 3350) 17 g Pack  Commonly known as: Miralax  Notes to patient: 17 g Wed, Thurs, Sat      17 g Wed, Thurs, Sat    Refills: 0     tetrahydrozoline 0.05 % Soln  Commonly known as: Visine  Notes to patient: 1 drop 2 (two) times daily as needed.      1 drop 2 (two) times daily as needed.   Refills: 0     Tradjenta 5 MG Tabs  Generic drug: linaGLIPtin      Take 1 tablet (5 mg total) by mouth daily.   Quantity: 90 tablet  Refills: 0     traMADol 50 MG Tabs  Commonly known as: Ultram  Notes to patient: Take 1 tablet (50 mg total) by mouth every 12 (twelve) hours as needed for Pain.      Take 1 tablet (50 mg total) by mouth every 12 (twelve) hours as needed for Pain.   Refills: 0            STOP taking these medications      Lantus SoloStar 100 UNIT/ML Sopn  Generic drug: insulin glargine        metoprolol tartrate 50 MG Tabs  Commonly known as: Lopressor                  Where to Get Your Medications        These medications were sent to [id number] - [place], [place] - [address] [phone], [phone]  [facility] [facility] [address]      Phone: [phone]   clopidogrel 75 MG Tabs  hydrALAZINE 25 MG Tabs         Follow-up appointment:   Caridad Valente APN  [address]  [address]  [place] [id number] [phone]    Schedule an appointment as soon as possible for a visit in 1 week(s)  For wound re-check    Herman Phelan MD  [address] [place] [address]  [address]  [place] [id number] [phone]    Schedule an appointment as soon as possible for a visit  For follow up    Appointments for Next 30 Days 5/22/2024 - 6/21/2024      None            Supplementary Documentation:   [place]PMP reviewed[place]    Vital signs:  Temp:  [97.4 °F (36.3 °C)-97.8 °F (36.6 °C)] 97.5 °F (36.4 °C)  Pulse:  [65-71] 71  Resp:  [13-20] 20  BP: (144-206)/(55-75) 144/75  SpO2:  [93 %-99 %] 99 %    Physical Exam:    General:  NAD  Cardiovascular:  S1, S2    -----------------------------------------------------------------------------------------------  PATIENT DISCHARGE INSTRUCTIONS: See electronic chart    Tip: Documentation  requirements: For split shared discharge, BOTH providers need to document specific floor, unit, and time spent on the discharge.  The note needs to be signed by the provider with > 50% of time and bill under their NPI.   Time spent:  >30 min         Ebonie Simmons DO

## 2024-05-22 NOTE — CONSULTS
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  He had PCI performed and remained in the hospital overnight.  He is due for dialysis today    Past Medical History  Past Medical History:    Anemia    Asthma (HCC)    Back problem    BPH (benign prostatic hyperplasia)    Calculus of kidney    Cataract    Diabetes (HCC)    ESRD (end stage renal disease) on dialysis (HCC)    Essential hypertension    High blood pressure    High cholesterol    History of blood transfusion    Hyperlipidemia    Neuropathy    hands and feet    KRAIG on CPAP    Renal disorder    Sleep apnea    Visual impairment    glasses    Vocal cord paralysis, unilateral partial       Past Surgical History  Past Surgical History:   Procedure Laterality Date    Appendectomy          Back surgery      Neck/back -     Capsule endoscopy - internal referral      Cataract      2021 and 2022    Colonoscopy      Colonoscopy N/A 2021    Procedure: COLONOSCOPY;  Surgeon: Michael Gautam MD;  Location: [facility]    Hand/finger surgery unlisted      Accidental trauma    Upper gi endoscopy,diagnosis         Family History  Family History   Problem Relation Age of Onset    Cancer Father         Kidney    Breast Cancer Mother     Diabetes Mother     Diabetes Maternal Grandmother     Diabetes Maternal Grandfather     Heart Disorder Other         Uncle       Social History  Social  History     Socioeconomic History    Marital status:    Tobacco Use    Smoking status: Former     Current packs/day: 0.00     Average packs/day: 1 pack/day for 17.0 years (17.0 ttl pk-yrs)     Types: Cigarettes     Start date: 1964     Quit date: 1981     Years since quittin.4    Smokeless tobacco: Never   Vaping Use    Vaping status: Never Used   Substance and Sexual Activity    Alcohol use: No     Alcohol/week: 0.0 standard drinks of alcohol    Drug use: No    Sexual activity: Yes     Partners: Female     Social Determinants of Health     Financial Resource Strain: Low Risk  (2023)    Financial Resource Strain     Difficulty of Paying Living Expenses: Not hard at all     Med Affordability: No   Food Insecurity: No Food Insecurity (2024)    Food Insecurity     Food Insecurity: Never true   Transportation Needs: No Transportation Needs (2024)    Transportation Needs     Lack of Transportation: No   Housing Stability: Low Risk  (2024)    Housing Stability     Housing Instability: No       Current Medications:  Current Facility-Administered Medications   Medication Dose Route Frequency    apixaban (Eliquis) tab 5 mg  5 mg Oral BID    aspirin DR tab 81 mg  81 mg Oral Daily    atorvastatin (Lipitor) tab 40 mg  40 mg Oral Nightly    carvedilol (Coreg) tab 25 mg  25 mg Oral BID    escitalopram (Lexapro) tab 5 mg  5 mg Oral Daily    pantoprazole (Protonix) DR tab 40 mg  40 mg Oral QAM AC    clopidogrel (Plavix) tab 75 mg  75 mg Oral Daily    hydrALAZINE (Apresoline) tab 25 mg  25 mg Oral Q8H STEPHANIE    sodium chloride 0.9 % IV bolus 100 mL  100 mL Intravenous Q30 Min PRN    And    albumin human (Albumin) 25% injection 25 g  25 g Intravenous PRN Dialysis    hydrALAzine (Apresoline) 20 mg/mL injection 10 mg  10 mg Intravenous Q4H PRN    albuterol (Ventolin HFA) 108 (90 Base) MCG/ACT inhaler 2 puff  2 puff Inhalation Q4H PRN    amLODIPine (Norvasc) tab 10 mg  10 mg Oral Daily    glucose  (Dex4) 15 GM/59ML oral liquid 15 g  15 g Oral Q15 Min PRN    Or    glucose (Glutose) 40% oral gel 15 g  15 g Oral Q15 Min PRN    Or    glucose-vitamin C (Dex-4) chewable tab 4 tablet  4 tablet Oral Q15 Min PRN    Or    dextrose 50% injection 50 mL  50 mL Intravenous Q15 Min PRN    Or    glucose (Dex4) 15 GM/59ML oral liquid 30 g  30 g Oral Q15 Min PRN    Or    glucose (Glutose) 40% oral gel 30 g  30 g Oral Q15 Min PRN    Or    glucose-vitamin C (Dex-4) chewable tab 8 tablet  8 tablet Oral Q15 Min PRN    insulin aspart (NovoLOG) 100 Units/mL FlexPen 1-5 Units  1-5 Units Subcutaneous TID CC    acetaminophen (Tylenol Extra Strength) tab 500 mg  500 mg Oral Daily PRN     Medications Prior to Admission   Medication Sig    linaGLIPtin (TRADJENTA) 5 mg Oral Tab Take 1 tablet (5 mg total) by mouth daily.    escitalopram 5 MG Oral Tab Take 1 tablet (5 mg total) by mouth daily.    carvedilol 25 MG Oral Tab Take 1 tablet (25 mg total) by mouth 2 (two) times daily.    atorvastatin 40 MG Oral Tab Take 1 tablet (40 mg total) by mouth nightly.    LANTUS SOLOSTAR 100 UNIT/ML Subcutaneous Solution Pen-injector Inject 16-18 Units into the skin nightly.    apixaban 5 MG Oral Tab Take 1 tablet (5 mg total) by mouth 2 (two) times daily.    acetaminophen 500 MG Oral Tab Take 1 tablet (500 mg total) by mouth daily as needed for Pain.    cetirizine 10 MG Oral Tab Take 1 tablet (10 mg total) by mouth every other day.    Cholecalciferol 125 MCG (5000 UT) Oral Tab Take 1 tablet (5,000 Units total) by mouth 2 (two) times daily.    cyanocobalamin 100 MCG Oral Tab Take 2.5 tablets (250 mcg total) by mouth every other day.    polyethylene glycol, PEG 3350, 17 g Oral Powd Pack 17 g Wed, Thurs, Sat    tetrahydrozoline 0.05 % Ophthalmic Solution 1 drop 2 (two) times daily as needed.    traMADol 50 MG Oral Tab Take 1 tablet (50 mg total) by mouth every 12 (twelve) hours as needed for Pain.    felodipine ER 10 MG Oral Tablet 24 Hr Take 1 tablet (10 mg  total) by mouth daily.    fluticasone propionate 50 MCG/ACT Nasal Suspension 2 sprays by Nasal route daily.    pantoprazole 40 MG Oral Tab EC Take 1 tablet (40 mg total) by mouth every morning before breakfast.    albuterol (PROAIR HFA) 108 (90 Base) MCG/ACT Inhalation Aero Soln Inhale 2 puffs into the lungs every 4 (four) hours as needed for Wheezing.    Budesonide-Formoterol Fumarate (SYMBICORT) 160-4.5 MCG/ACT Inhalation Aerosol Inhale 2 puffs into the lungs 2 (two) times daily. (Patient taking differently: Inhale 2 puffs into the lungs 2 (two) times daily as needed.)    aspirin 81 MG Oral Tab Take 1 tablet (81 mg total) by mouth daily.    [DISCONTINUED] metoprolol tartrate 50 MG Oral Tab Take 1 tablet (50 mg total) by mouth 2 (two) times daily. (Patient not taking: Reported on 5/16/2024)    [DISCONTINUED] metoprolol tartrate 50 MG Oral Tab Take 1 tablet (50 mg total) by mouth 2 (two) times daily. (Patient not taking: Reported on 5/16/2024)    Glucose Blood (CONTOUR NEXT TEST) In Vitro Strip Test 3 times daily    methocarbamol 500 MG Oral Tab Take 1 tablet (500 mg total) by mouth 2 (two) times daily as needed.    Insulin Pen Needle (PEN NEEDLES) 32G X 4 MM Does not apply Misc 1 each daily.    Darbepoetin Randell 60 MCG/ML Injection Solution Inject into the vein as needed.       Allergies  Allergies   Allergen Reactions    Adhesive Tape OTHER (SEE COMMENTS)     Severe rashes    Dust Mite Extract RASH       Review of Systems:     General: weak        A comprehensive 12 point review of systems was completed.  Pertinent positives as above and all the rest were negative.     Physical Exam:   /75 (BP Location: Left arm)   Pulse 71   Temp 97.5 °F (36.4 °C) (Oral)   Resp 20   Ht 5' 3.5\" (1.613 m)   Wt 152 lb 1.6 oz (69 kg)   SpO2 99%   BMI 26.52 kg/m²      Intake/Output Summary (Last 24 hours) at 5/22/2024 1048  Last data filed at 5/22/2024 0829  Gross per 24 hour   Intake 1090 ml   Output 250 ml   Net 840 ml      Wt Readings from Last 1 Encounters:   05/22/24 152 lb 1.6 oz (69 kg)       Exam  Gen: No acute distress  Heent: NC AT, mucous memb clear, neck supple  Pulm: Lungs clear, normal respiratory effort  CV: Heart with regular rate and rhythm, no edema  Abd: Abdomen soft, nontender, nondistended, no organomegaly, bowel sounds present  Skin: no symptoms reported  Psych: alert and oriented        Results:     Laboratory Data:  Recent Labs   Lab 05/22/24  0919   RBC 3.76*   HGB 11.5*   HCT 33.8*   MCV 89.9   MCH 30.6   MCHC 34.0   RDW 17.3*   WBC 5.6   .0         Recent Labs   Lab 05/22/24  0919   *   BUN 49*   CREATSERUM 5.72*   CA 9.7      K 4.3      CO2 30.0        Imaging:  No results found.            Impression/Receommendations:     1 - ESRD  Plan dialysis today as per schedule    2 - HTN w ESRD  He is on amlodipine, Coreg, hydralazine    3 - CAD  Status post PCI    4 - DM 2 w nephropathy  Accu-Cheks    Okay to discharge after dialysis today  Thank you for allowing me to participate in the care of your patient.    SONDRA MART MD  5/22/2024

## 2024-05-22 NOTE — PROGRESS NOTES
[facility]  [facility]  Cardiology Progress Note    Ollie Hernández Patient Status:  Outpatient in a Bed    1945 MRN [id number]   Location [facility] 3W/SW Attending Herman Phelan MD   Hosp Day # 0 PCP Wili Parmar MD       Impression:  1. CAD s/p PCI    - Circumflex: Ramin 2.75 x 15 mm ABIMBOLA, post dilated to 3.0 mm distally and 3.5 mm proximally   - Residual 70% lesion in the distal LAD at the apex, which is located suboptimally for PCI. Medical management.  2. HTN  3. T2DM  4. HLD  5. KRAIG  6. ESRD on iHD MWF    Recommendations:  Triple therapy with aspirin 81 mg, Plavix 75 mg, Eliquis 5 mg for 1 week followed by dual antithrombotic therapy with Plavix 75 mg plus Eliquis 5 mg for 6 months.  Medical management of distal LAD lesion as it is suboptimally located for PCI.  Continue norvasc 10mg daily  Continue coreg 25mg BID  Hydralazine 25mg TID added here. Can be discharged on this given high BP.  Okay for dc from CV standpoint.     Subjective:   NO acute overnight events.     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)    Atherosclerosis of native arteries of extremities with intermittent claudication, bilateral legs (HCC)    Primary hypertension    Smokers' cough (HCC)       Objective:   Temp: 97.5 °F (36.4 °C)  Pulse: 71  Resp: 20  BP: 144/75    Intake/Output:     Intake/Output Summary (Last 24 hours) at 2024 0930  Last data filed at 2024 0829  Gross per 24 hour   Intake 1090 ml    Output 250 ml   Net 840 ml       Last 3 Weights   05/22/24 0700 152 lb 1.6 oz (69 kg)   05/21/24 1909 158 lb (71.7 kg)   05/16/24 0743 170 lb (77.1 kg)   04/02/24 0853 171 lb (77.6 kg)   02/29/24 1208 169 lb (76.7 kg)       Tele: NSR    Physical Exam:    General: Alert and oriented x 3. No apparent distress. No respiratory or constitutional distress.  HEENT: Normocephalic, anicteric sclera, neck supple, no thyromegaly or adenopathy.  Neck: No JVD, carotids 2+, no bruits.  Cardiac: Regular rate and rhythm. S1, S2 normal. No murmur, pericardial rub, S3, thrill, heave or extra cardiac sounds.  Lungs: Clear without wheezes, rales, rhonchi or dullness.  Normal excursions and effort.  Abdomen: Soft, non-tender. No organosplenomegally, mass or rebound, BS-present.  Extremities: Without clubbing, cyanosis or edema.  Peripheral pulses are 2+.  Neurologic: Alert and oriented, normal affect. No motor or coordinational deficit.  Skin: Warm and dry.     Laboratory/Data:    Labs:         No results for input(s): \"WBC\", \"HGB\", \"MCV\", \"PLT\", \"BAND\", \"INR\" in the last 168 hours.    Invalid input(s): \"LYM#\", \"MONO#\", \"BASOS#\", \"EOSIN#\"    No results for input(s): \"NA\", \"K\", \"CL\", \"CO2\", \"BUN\", \"CREATSERUM\", \"CA\", \"CAION\", \"MG\", \"PHOS\", \"GLU\" in the last 168 hours.    No results for input(s): \"ALT\", \"AST\", \"ALB\", \"AMYLASE\", \"LIPASE\", \"LDH\" in the last 168 hours.    Invalid input(s): \"ALPHOS\", \"TBIL\", \"DBIL\", \"TPROT\"    No results for input(s): \"TROP\" in the last 168 hours.    Allergies:   Allergies   Allergen Reactions    Adhesive Tape OTHER (SEE COMMENTS)     Severe rashes    Dust Mite Extract RASH       Medications:  Current Facility-Administered Medications   Medication Dose Route Frequency    apixaban (Eliquis) tab 5 mg  5 mg Oral BID    aspirin DR tab 81 mg  81 mg Oral Daily    atorvastatin (Lipitor) tab 40 mg  40 mg Oral Nightly    carvedilol (Coreg) tab 25 mg  25 mg Oral BID    escitalopram (Lexapro) tab 5 mg  5 mg Oral Daily     pantoprazole (Protonix) DR tab 40 mg  40 mg Oral QAM AC    clopidogrel (Plavix) tab 75 mg  75 mg Oral Daily    hydrALAZINE (Apresoline) tab 25 mg  25 mg Oral Q8H STEPHANIE    sodium chloride 0.9 % IV bolus 100 mL  100 mL Intravenous Q30 Min PRN    And    albumin human (Albumin) 25% injection 25 g  25 g Intravenous PRN Dialysis    hydrALAzine (Apresoline) 20 mg/mL injection 10 mg  10 mg Intravenous Q4H PRN    albuterol (Ventolin HFA) 108 (90 Base) MCG/ACT inhaler 2 puff  2 puff Inhalation Q4H PRN    amLODIPine (Norvasc) tab 10 mg  10 mg Oral Daily    glucose (Dex4) 15 GM/59ML oral liquid 15 g  15 g Oral Q15 Min PRN    Or    glucose (Glutose) 40% oral gel 15 g  15 g Oral Q15 Min PRN    Or    glucose-vitamin C (Dex-4) chewable tab 4 tablet  4 tablet Oral Q15 Min PRN    Or    dextrose 50% injection 50 mL  50 mL Intravenous Q15 Min PRN    Or    glucose (Dex4) 15 GM/59ML oral liquid 30 g  30 g Oral Q15 Min PRN    Or    glucose (Glutose) 40% oral gel 30 g  30 g Oral Q15 Min PRN    Or    glucose-vitamin C (Dex-4) chewable tab 8 tablet  8 tablet Oral Q15 Min PRN    insulin aspart (NovoLOG) 100 Units/mL FlexPen 1-5 Units  1-5 Units Subcutaneous TID CC    acetaminophen (Tylenol Extra Strength) tab 500 mg  500 mg Oral Daily PRN       Luis Orozco MD  5/22/2024  9:30 AM

## 2024-05-22 NOTE — CARDIAC REHAB
Cardiac Rehab Phase I    Activity:  Sitting up in Chair, during consult. Wife Stephy, at patients side.    Education:  Handouts provided and reviewed: [facility] Your [facility] Booklet and Stent ID Card    Disease Process: Disease process reviewed, regarding Stent, antiplatelet therapy    Reviewed the following: SITE CARE: Right groin      RISK FACTORS: discussed (dialysis, DM patient)      SMOKING CESSATION: Non smoker      HOME EXERCISE ACTIVITY: Encouraged      OUTPATIENT CARDIAC REHAB: Encouraged to attend Cardiac Rehabilitation Phase II upon MD order. Pt to schedule.

## 2024-05-23 ENCOUNTER — TELEPHONE (OUTPATIENT)
Dept: INTERNAL MEDICINE CLINIC | Facility: CLINIC | Age: 79
End: 2024-05-23

## 2024-05-23 ENCOUNTER — PATIENT OUTREACH (OUTPATIENT)
Dept: CASE MANAGEMENT | Age: 79
End: 2024-05-23

## 2024-05-23 DIAGNOSIS — J44.89 CHRONIC OBSTRUCTIVE ASTHMA (HCC): ICD-10-CM

## 2024-05-23 DIAGNOSIS — E11.22 TYPE 2 DIABETES MELLITUS WITH CHRONIC KIDNEY DISEASE ON CHRONIC DIALYSIS, WITH LONG-TERM CURRENT USE OF INSULIN (HCC): ICD-10-CM

## 2024-05-23 DIAGNOSIS — Z99.2 TYPE 2 DIABETES MELLITUS WITH CHRONIC KIDNEY DISEASE ON CHRONIC DIALYSIS, WITH LONG-TERM CURRENT USE OF INSULIN (HCC): ICD-10-CM

## 2024-05-23 DIAGNOSIS — Z79.4 TYPE 2 DIABETES MELLITUS WITH CHRONIC KIDNEY DISEASE ON CHRONIC DIALYSIS, WITH LONG-TERM CURRENT USE OF INSULIN (HCC): ICD-10-CM

## 2024-05-23 DIAGNOSIS — N18.6 TYPE 2 DIABETES MELLITUS WITH CHRONIC KIDNEY DISEASE ON CHRONIC DIALYSIS, WITH LONG-TERM CURRENT USE OF INSULIN (HCC): ICD-10-CM

## 2024-05-23 DIAGNOSIS — Z02.9 ENCOUNTERS FOR UNSPECIFIED ADMINISTRATIVE PURPOSE: ICD-10-CM

## 2024-05-23 DIAGNOSIS — I10 PRIMARY HYPERTENSION: ICD-10-CM

## 2024-05-23 DIAGNOSIS — I50.32 CHRONIC DIASTOLIC CONGESTIVE HEART FAILURE (HCC): Primary | ICD-10-CM

## 2024-05-23 PROCEDURE — 1125F AMNT PAIN NOTED PAIN PRSNT: CPT

## 2024-05-23 NOTE — PROGRESS NOTES
TCM has contacted patient and patient needs additional appointments.  Please contact patient for assistance with scheduling a follow-up appointment for Cardiology.  Thank you!      Schedule an appointment with Herman Phelan as soon as  possible for a visit  Specialty: Cardiac Electrophysiology, CARDIOLOGY  For follow up  [address]  [address]  [place] [id number] [phone]    Schedule an appointment with Caridad Valente as soon as  possible for a visit in 1 week(s)  Specialty: Nurse Practitioner  For wound re-check  [facility]  [address]  [address]  [place] [id number] [phone]

## 2024-05-23 NOTE — TELEPHONE ENCOUNTER
FYI:     Nurse care manager spoke to the patient today for a Transitional Care Management hospital follow up call.     The patient was scheduled for a virtual visit with Dr. Simmons tomorrow, 5/24/2024.     The patient was unable to complete the virtual visit at the scheduled 11:00 time due to hemodialysis. The patient completes hemodialysis on Monday/Wednesday/Friday from 10:00-2:15 and would not be available for an appointment. The patient also explained hemodialysis is not completed in a private room and the patient would be uncomfortable completing a virtual visit at that time. The patient also mentioned phone service and the ability to answer the phone is not reliable.     Nurse care manager attempted to reschedule a virtual visit on 5/24/2024 before 9:30 or after 3:00 as requested, but there was no availability with Dr. Steiner or Dr. Parmar.     An in office Transitional Care Management-hospital follow up appointment was scheduled with Dr. Parmar on 5/28/2024.       A Transitional Care Management appointment is recommended by5/29/2024, as the patient is a high risk for readmission.     Transitional Care Management book by date: 6/5/2024.     Please provide this update to Dr. Simmons and Dr. Parmar and reschedule the patient if recommended. Thank you.

## 2024-05-23 NOTE — PROGRESS NOTES
Initial Post Discharge Follow Up   Discharge Date: 5/22/24  Contact Date: 5/23/2024    Consent Verification:  Assessment Completed With: Patient  HIPAA Verified?  Yes    Discharge Dx:   Abnormal stress test  Pt underwent LHC 5/21/2024 with Dr. 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 The patient reports doing well at home.  The patient denies any dizziness, weakness, fatigue, shortness of breath, chest pain, or palpitations or change in heart beat/rhythm. The patient reported improvement with energy/strength. The patient denies any weight gain or new/increased edema. The patient did admit to right groin incisional soreness rated 2-3/10 for intensity. The patient denies any redness, pain, edema, discharge/bleeding, foul odor or heat coming from the incision site. The incision was described as intact and soft to the touch. The patient also denies any malaise or fever. The patient also denies any hematuria, melena, hematochezia, or bleeding from the gums, nose or cuts. The patient did admit to continued back/neck pain that has been chronic. The patient has continued a diabetic diet at home and denies any difficulty swallowing. The patient will resume hemodialysis tomorrow. The patient reports stable glucose and blood pressure readings but the patient was unable to report specific values to the nurse care manager.    Do you have any pain since discharge?   Yes  Where: right groin incision   Rating on pain scale 1-10, 10 being the worst pain you have ever experienced, what is current pain: 3  Alleviating factors: tender to the touch.     Is the pain manageable at home? Yes  The patient also reported continued chronic back pain.   How well was your pain managed while in the hospital?   On a scale of 1-5   1- Very Poor and 5- Very well   Very Well  When you were leaving the hospital were your discharge instructions reviewed with you? Yes  How well were your discharge instructions explained to you?   On a scale of 1-5   1- Very Poor and 5- Very well   Very Well  Do you have any questions about your discharge instructions?  No  Before leaving the hospital was your diagnoses explained to you? Yes  Do you have any questions about your diagnoses? No  Are you able to perform normal daily activities of living as you have prior to your hospital stay (dressing, bathing, ambulating to the bathroom, etc)? yes; NCM did review/stress the importance of fall precautions.   (NCM) Was patient given a different diet per AVS? no      Medications: NCM did confirm the patient has discontinued the following as directed:    STOP taking:  Lantus SoloStar 100 UNIT/ML Sopn (insulin glargine)  metoprolol tartrate 50 MG Tabs (Lopressor)  Current Outpatient Medications   Medication Sig Dispense Refill    hydrALAZINE 25 MG Oral Tab Take 1 tablet (25 mg total) by mouth every 8 (eight) hours. 90 tablet 1    clopidogrel 75 MG Oral Tab Take 1 tablet (75 mg total) by mouth daily. 90 tablet 1    linaGLIPtin (TRADJENTA) 5 mg Oral Tab Take 1 tablet (5 mg total) by mouth daily. 90 tablet 0    escitalopram 5 MG Oral Tab Take 1 tablet (5 mg total) by mouth daily. 90 tablet 3    carvedilol 25 MG Oral Tab Take 1 tablet (25 mg total) by mouth 2 (two) times daily.      atorvastatin 40 MG Oral Tab Take 1 tablet (40 mg total) by mouth nightly. 90 tablet 3    apixaban 5 MG Oral Tab Take 1 tablet (5 mg total) by mouth  2 (two) times daily.      acetaminophen 500 MG Oral Tab Take 1 tablet (500 mg total) by mouth daily as needed for Pain.      cetirizine 10 MG Oral Tab Take 1 tablet (10 mg total) by mouth every other day.      Cholecalciferol 125 MCG (5000 UT) Oral Tab Take 1 tablet (5,000 Units total) by mouth 2 (two) times daily.      cyanocobalamin 100 MCG Oral Tab Take 2.5 tablets (250 mcg total) by mouth every other day.      polyethylene glycol, PEG 3350, 17 g Oral Powd Pack 17 g Wed, Thurs, Sat      tetrahydrozoline 0.05 % Ophthalmic Solution 1 drop 2 (two) times daily as needed.      traMADol 50 MG Oral Tab Take 1 tablet (50 mg total) by mouth every 12 (twelve) hours as needed for Pain.      Glucose Blood (CONTOUR NEXT TEST) In Vitro Strip Test 3 times daily 300 each 1    felodipine ER 10 MG Oral Tablet 24 Hr Take 1 tablet (10 mg total) by mouth daily. 90 tablet 3    fluticasone propionate 50 MCG/ACT Nasal Suspension 2 sprays by Nasal route daily. 48 g 3    pantoprazole 40 MG Oral Tab EC Take 1 tablet (40 mg total) by mouth every morning before breakfast. 90 tablet 3    methocarbamol 500 MG Oral Tab Take 1 tablet (500 mg total) by mouth 2 (two) times daily as needed. 60 tablet 1    Insulin Pen Needle (PEN NEEDLES) 32G X 4 MM Does not apply Misc 1 each daily. 100 each 0    albuterol (PROAIR HFA) 108 (90 Base) MCG/ACT Inhalation Aero Soln Inhale 2 puffs into the lungs every 4 (four) hours as needed for Wheezing. 3 each 3    Budesonide-Formoterol Fumarate (SYMBICORT) 160-4.5 MCG/ACT Inhalation Aerosol Inhale 2 puffs into the lungs 2 (two) times daily. (Patient taking differently: Inhale 2 puffs into the lungs 2 (two) times daily as needed.) 3 each 3    Darbepoetin Randell 60 MCG/ML Injection Solution Inject into the vein as needed.      aspirin 81 MG Oral Tab Take 1 tablet (81 mg total) by mouth daily.       Were there any changes to your current medication(s) noted on the AVS? Yes  If so, were these medication changes discussed  with you prior to leaving the hospital? Yes  If a new medication was prescribed:    Was the new medication's purpose & side effects reviewed? Yes  Do you have any questions about your new medication? No  Did you  your discharge medications when you left the hospital? Yes    The patient confirmed picking up the following Rx:   Medication Quantity Refills Start End   clopidogrel 75 MG Oral Tab 90 tablet 1 5/21/2024 --   Sig:   Take 1 tablet (75 mg total) by mouth daily.     Route:   Oral       The patient will  the following Rx from the pharmacy today:   Medication Quantity Refills Start End   hydrALAZINE 25 MG Oral Tab 90 tablet 1 5/22/2024 --   Sig:   Take 1 tablet (25 mg total) by mouth every 8 (eight) hours.         Let's go over your medications together to make sure we are not missing anything. Patient Declined, explainThe patient declined full medication review. Nurse care manager did review discontinued medications, medications changes, newly prescribed medications, and antiplatelet/anticoagulation therapy.  Are there any reasons that keep you from taking your medication as prescribed? No      Discharge medications reviewed/discussed/and reconciled against outpatient medications with patient.  Any changes or updates to medications sent to PCP.  Patient Declined     Referrals/orders at D/C:  Referrals/orders placed at D/C? no    Except for Home Health Services mentioned above, have you scheduled these other services? Yes; The patient completes outpatient dialysis on Monday/Wednesday/Fridays from 10:00 to 2:15.     If yes, have you started these services? no    DME ordered at D/C? No  The patient has a walker at home.     Discharge orders, AVS reviewed and discussed with patient. Any changes or updates to orders sent to PCP.  Patient Acknowledged      SDOH:   Transportation:    Transportation Needs: No Transportation Needs (5/23/2024)    Transportation Needs     Lack of Transportation: No     Car  Seat: Not on file     Financial Strain:   Financial Resource Strain: Low Risk  (5/23/2024)    Financial Resource Strain     Difficulty of Paying Living Expenses: Not hard at all     Med Affordability: No        Diagnosis specifics:   Have you been experiencing any symptoms since you returned home from the hospital?       No  Any shortness of breath?  no  Did you have a procedure (cardiac cath or angiogram) while in the hospital? yes    If yes, is the site healed?    yes      Is the site red or swollen?  no      Any signs of infection?    no  Any bleeding from the site?  no     CHF:   With your CHF diagnosis weighing yourself is very important  How often are you weighing yourself? The patient checks weight at hemodialysis.   Were you told about any fluid restrictions? No  Have you noticed any shortness of breath or waking up short of breath? no  Since discharge do you feel you are urinating more or less?  No     Are you urinating more at night? no    Do you notice any pain or swelling in your abdomen?   no      Ankles or Legs?   no      Follow up appointments:      Your appointments       Date & Time Appointment Department ([place])    May 24, 2024 11:00 AM CDT Virtual Phone Visit with Ebonie Simmons DO [facility] ([facility])    You have been scheduled for a Virtual Telephone visit with your provider. Please be available at your phone so that your physician can contact you, and be prepared with any questions or concerns. As always, your health is our priority.     We suggest confirming with your insurance regarding coverage prior to your appointment as some payors may no longer cover telephone visits. Depending on your insurance you may also be billed a copay at a later time.        Jun 27, 2024 2:00 PM CDT MA Supervisit with Wili Parmar MD [facility] ([facility])        Aug 20, 2024  9:45 AM CDT Follow Up Visit with Pushpa [place]villa MD [facility] ([facility])              [facility]  [address]  [place] [address]  [phone] [facility]  [address]  [facility] [id number] [phone]            TCC  Was TCC ordered: No      PCP (If no TCC appointment)  Does patient already have a PCP appointment scheduled? Yes; virtual visit with Dr. Simmons on 5/24/2024.     [facility] Rescheduled PCP office TCM appointment with patient for 5/28/2024, with Dr. Parmar.      The patient was scheduled for a virtual visit with Dr. Simmons tomorrow, 5/24/2024.     The patient was unable to complete the virtual visit at the scheduled 11:00 time due to hemodialysis. The patient completes hemodialysis on Monday/Wednesday/Friday from 10:00-2:15 and would not be available for an appointment. The patient also explained hemodialysis is not completed in a private room and the patient would be uncomfortable completing a virtual visit at that time. The patient also mentioned phone service and the ability to answer the phone is not reliable.     Nurse care manager attempted to reschedule a virtual visit on 5/24/2024 before 9:30 or after 3:00 as requested, but there was no availability with Dr. Steiner or Dr. Parmar.     An in office Transitional Care Management-hospital follow up appointment was scheduled with Dr. Parmar on 5/28/2024.     A telephone encounter was sent to the primary care physician office for an appointment review.       Specialist    Does the patient have any other follow up appointment(s) needing to be scheduled? Yes    Schedule an appointment with Herman Phelan as soon as  possible for a visit  Specialty: Cardiac Electrophysiology, CARDIOLOGY  For follow up  [facility]  [address]  [address]  [facility] [id number] [phone]    Schedule an appointment with Caridad Ambrosio as soon as  possible for a visit in 1 week(s)  Specialty: Nurse Practitioner  For wound re-check  [place] [address]  [address]  [place] [place] [id number] [phone]  If yes: NCM reviewed upcoming specialist appointment with patient: Yes  Does the patient need assistance scheduling appointment(s): Yes, message sent to TST team    Is there any reason as to why you cannot make your appointment(s)?  No     Needs post D/C:   Now that you are home, are there any needs or concerns you need addressed before your next visit with your PCP?  (DME, meds, questions, etc.): No    Interventions by NCM:    Reviewed all discharge instructions with the patient with a focus on post-op directions/restrictions, wound care, diet, hemodialysis schedule, and fall precautions. All medications were reviewed and educated on the importance of taking the medications as directed.  Patient symptoms were assessed, education was completed for signs/symptoms to monitor, and reviewed when to contact providers versus go to the emergency department/call 911. Appointments were reviewed and stressed the importance of a close follow up with providers. A Transitional Care Management appointment was scheduled and a telephone encounter was sent to the primary care provider's office for an appointment review. An outreach was sent to the [facility] Scheduling Team.      The patient verbalized understanding and will contact the office with any further questions or concerns.       Overall Rating:   How would you rate the care you received while in the hospital? good    CCM referral placed:    Yes    BOOK BY DATE: 6/5/2024

## 2024-05-23 NOTE — PROGRESS NOTES
Per TCM message :      TCM has contacted patient and patient needs additional appointments.  Please contact patient for assistance with scheduling a follow-up appointment for Cardiology.  Thank you!        Schedule an appointment with Herman Phelan as soon as  possible for a visit  Specialty: Cardiac Electrophysiology, CARDIOLOGY  For follow up  [address]  [address]  [facility] [address]  [phone]  Thursday 6/6 @9am w/ Caridad PRADHANN, FMP    Confirmed scheduled appt w/ pt spouse & verbalized that she understands.    No further assistance needed    Closing encounter

## 2024-05-24 ENCOUNTER — TELEPHONE (OUTPATIENT)
Dept: INTERNAL MEDICINE CLINIC | Facility: CLINIC | Age: 79
End: 2024-05-24

## 2024-05-24 NOTE — TELEPHONE ENCOUNTER
To CHRISTIN Disla...    Called pt's wife/Stephy ( HIPAA verified). I informed wife that I did leave a voicemail for pt asking for him to call office back. Per Wife, pt's blood sugar reading on 5-23 was 151. This mornings reading was \" normal\" but does not know what it is. Wife stated that pt was concerned after recent hospitalization and will not allow his blood sugar to be elevated and is following his diet guidelines. Wife is picking up pt today from dialysis at 2 PM and once home will call back with today's blood sugar reading. She is aware office closes at 3 PM today.

## 2024-05-26 NOTE — PATIENT INSTRUCTIONS
You are seen in clinic today for follow-up.  Today, we did review your most recent hospitalization.  He did require stent placement  - As recommended by cardiology, for a week since discharge we completed aspirin, Plavix, Eliquis  - As instructed, lets stop the aspirin and proceed with Plavix/Eliquis (also known as apixaban)  -Follow-up with the nurse practitioner Caridad Valente and we may need to start cardiac rehab in the near future  - Follow-up with Dr. Phelan with cardiology for further management of your heart condition    Periodically check your blood pressure at home    Periodically check your blood sugars at home  - We are checking blood test before your next visit.  In the meantime, discontinue the insulin as your A1c levels remain low  - Monitor for very high or very low levels through your Dexcom  - Follow-up with Dr. Sevilla    There may be some fluid buildup in both ears.  - Continue with Flonase 1 spray each nostril until symptoms improve  - Follow-up with Dr. Pfeiffer given your history of chronic ear infection.    Will plan to check blood test prior to your next annual physical examination.  Return as scheduled within 1 month

## 2024-05-26 NOTE — H&P
Patient is a 79-year-old male past medical history of A-fib, coronary artery disease, hypertension, hyperlipidemia, CKD stage IV, type 2 diabetes who presents today for posthospitalization follow-up.      Of note, patient was admitted 5/21 - 5/22  For scheduled left heart catheterization.  Noted to have abnormal stress test, underwent scheduled left heart catheterization status post PCI of left circumflex.  Did well with the procedure and was monitored overnight.    ***    VFSS 4/18/2024    Impression   CONCLUSION:  1. Intermittent shallow and deep laryngeal penetration without aspiration.  2. Small Zenker's diverticulum left paracentral region at C5-6.  Small barium tablet momentarily held up within the diverticulum eventually cleared with additional swallowing.  3. 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            Abnormal stress test  Pt underwent LHC 5/21/2024 with Dr. Luis Orozco: 70% lesion of apical LAD, 80% lesion of LCx  S/p PCI of LCx  Plan for aspirin, plavix and eliquis for 1 week, then plavix plus eliquis  Medical management of LAD     Pharyngeal dysphagia  Does have right side hypomobile oropharyngeal dysphagia  - swallow study 4/2024: intermittent shallow and deep laryngeal penetration without aspiration, small zenker's diverticulum, bulky endplate osteophytes contributing to dysphagia  - Will see Dr. Del Angel for further imaging     Paroxysmal A-fib  No prior diagnosis, reports that he was being seen in the outpatient clinic for 4 dialysis access concerns.  He was told he had A-fib and was advised to go to the emergency department, though [facility]  - Seen by Dr. Phelan 10/9, abnormal 5-day Zio patch.  Eliquis for anticoagulation  - We will continue with  carvedilol in the meantime  - Seen by Dr. Phelan 1/25/2024: Mildly abnormal nuclear stress test in presence of VT      Muscle cramping  Worse at nighttime sometimes experiences it after hemodialysis.  Poss related to fluid shifts, relative dehydration, electrolyte imbalance.  Lower suspicion for restless leg syndrome        Cervical radiculopathy  Chronic back pain with neuropathy  Has pain in the neck, back, resulting rate sided sciatica based on examination.  He does have some mild lower extremity decree sensation to temperature.  Multifactorial from degenerative disc disease, advanced osteoarthritis resulting in lumbar and cervical radiculopathy.  Does have some neuropathy from his history of diabetes  -Concern for unsteadiness, impaired gait with the need for use of cane.  -we will manage conservatively for now: Tylenol, aspirin as he has been taking.  Hesitant to use a muscle relaxer as this may increase his risk for falls  -I did review last CT scan of the cervical neck 7/2019 with history of cervical decompressive laminectomy with multilevel degenerative disc disease  - He did complete physical therapy in October  -I am recommending an MRI of the brain and cervical neck to determine if this is a more proximal cause of his sciatica.       Dizziness versus vertigo  Reports dizziness that is worsening over the last few weeks.  This is causing unsteadiness, imbalance of gait.  Uses a cane at baseline and has not experienced any mechanical falls at home  - On examination, he does have an unsteady gait that is improved with the use of a cane.  He does have some decrease sensation in the lower extremities, possibly related to known lumbar spinal stenosis with sciatica  - We will check MRI brain - 12/29/2022 moderate chronic microvascular ischemic changes bilateral cerebral hemispheres.  Chronic microvascular ischemia in the basal ganglionic lesions and Alamine  - Seems to have remained stable     Chronic diastolic  heart failure  -Last seen by cardiology, Dr. Jolly.  Maintained on current home medications.  - Home medications: Furosemide 40 mg daily, indapamide 2.5 mg daily, carvedilol 25 mg twice a day  - Continue following with cardiology     Type 2 diabetes complicated by neuropathy    ***    Current medications: Lantus 20 U nightly, linagliptin 5 mg daily  Eye exam: May be due for diabetic eye examination soon - will request.   Foot exam: Check feet daily  - Gabapentin for neuropathy  - Nutrition optimization recommended  - Dexcom - started with good improvement of sugars     ESRD on hemodialysis  Possibly related to prolonged history of diabetes and hypertension  - Creatinine on last check 6.30, EGFR 9  - Follows with Dr. Martinez     Hypertension  - Continue with home carvedilol 25 mg twice a day, hydralazine 25mg q8h, amlodipine 10mg daily     Hyperlipidemia  - Continue with On atorvastatin 40 mg daily, aspirin     BPH  -Currently stable on tamsulosin 0.4 mg daily, trospium 20 mg twice a day     Pulmonary hypertension  -Comanagement of KRAIG and chronic diastolic heart failure  - Seems to be stable     KRAIG on CPAP  -Needs new CPAP  - Referral to pulm - Dr. Landeros, last seen 12/14/2023     Cataracts  -Seems to be stable, follows with Dr. Chase of ophthalmology     Gout  Flareup in 6/2022. NO recurrence  -Seems to be stable     Anemia of chronic disease  Status post IV Venofer, last dose 5/9/2022  -Baseline hemoglobin seems to be around 9-10  - Repeat CBC with hemoglobin stable at 9.6 per Care Everywhere  - Iron studies within normal limits.     Sensorineural hearing loss  Mild-moderate per audiology testing/14/2022.  He may be a hearing aid candidate in the future  -Remained stable at this time     Unsteady Gait  Ongoing fatigue due to multiple comorbidities as above  - We will request DME for a new walker, 2 wheeled-2 constipation.  Does not want a rollator walker at this time  - He is a fall risk with his unsteady gait,  fatigue.  - This will allow adequate support to allow for improvement of ADLs, improvement in quality of life.     Anxiety  Lexapro 5 mg once a day.      ***

## 2024-05-28 ENCOUNTER — OFFICE VISIT (OUTPATIENT)
Dept: INTERNAL MEDICINE CLINIC | Facility: CLINIC | Age: 79
End: 2024-05-28

## 2024-05-28 VITALS
HEIGHT: 63.5 IN | DIASTOLIC BLOOD PRESSURE: 50 MMHG | HEART RATE: 72 BPM | OXYGEN SATURATION: 97 % | TEMPERATURE: 99 F | BODY MASS INDEX: 28.63 KG/M2 | SYSTOLIC BLOOD PRESSURE: 118 MMHG | WEIGHT: 163.63 LBS

## 2024-05-28 DIAGNOSIS — R13.19 OTHER DYSPHAGIA: ICD-10-CM

## 2024-05-28 DIAGNOSIS — Z13.1 SCREENING FOR DIABETES MELLITUS: ICD-10-CM

## 2024-05-28 DIAGNOSIS — E55.9 VITAMIN D DEFICIENCY: ICD-10-CM

## 2024-05-28 DIAGNOSIS — I10 PRIMARY HYPERTENSION: ICD-10-CM

## 2024-05-28 DIAGNOSIS — Z13.0 SCREENING FOR DEFICIENCY ANEMIA: ICD-10-CM

## 2024-05-28 DIAGNOSIS — M54.12 CERVICAL RADICULOPATHY: ICD-10-CM

## 2024-05-28 DIAGNOSIS — Z09 HOSPITAL DISCHARGE FOLLOW-UP: Primary | ICD-10-CM

## 2024-05-28 DIAGNOSIS — E11.21 TYPE 2 DIABETES MELLITUS WITH DIABETIC NEPHROPATHY, WITH LONG-TERM CURRENT USE OF INSULIN (HCC): ICD-10-CM

## 2024-05-28 DIAGNOSIS — E78.2 MIXED HYPERLIPIDEMIA: ICD-10-CM

## 2024-05-28 DIAGNOSIS — H60.62 CHRONIC OTITIS EXTERNA OF LEFT EAR, UNSPECIFIED TYPE: ICD-10-CM

## 2024-05-28 DIAGNOSIS — E53.8 VITAMIN B12 DEFICIENCY: ICD-10-CM

## 2024-05-28 DIAGNOSIS — Z79.4 TYPE 2 DIABETES MELLITUS WITH DIABETIC NEPHROPATHY, WITH LONG-TERM CURRENT USE OF INSULIN (HCC): ICD-10-CM

## 2024-05-28 DIAGNOSIS — Z13.29 SCREENING FOR THYROID DISORDER: ICD-10-CM

## 2024-05-28 DIAGNOSIS — Z79.4 TYPE 2 DIABETES MELLITUS WITH DIABETIC NEUROPATHY, WITH LONG-TERM CURRENT USE OF INSULIN (HCC): ICD-10-CM

## 2024-05-28 DIAGNOSIS — M10.9 GOUT, UNSPECIFIED CAUSE, UNSPECIFIED CHRONICITY, UNSPECIFIED SITE: ICD-10-CM

## 2024-05-28 DIAGNOSIS — R25.2 MUSCLE CRAMPING: ICD-10-CM

## 2024-05-28 DIAGNOSIS — I48.0 PAROXYSMAL ATRIAL FIBRILLATION (HCC): ICD-10-CM

## 2024-05-28 DIAGNOSIS — I50.32 CHRONIC DIASTOLIC CONGESTIVE HEART FAILURE (HCC): ICD-10-CM

## 2024-05-28 DIAGNOSIS — G47.33 OSA ON CPAP: ICD-10-CM

## 2024-05-28 DIAGNOSIS — E11.40 TYPE 2 DIABETES MELLITUS WITH DIABETIC NEUROPATHY, WITH LONG-TERM CURRENT USE OF INSULIN (HCC): ICD-10-CM

## 2024-05-28 DIAGNOSIS — I10 ESSENTIAL HYPERTENSION: ICD-10-CM

## 2024-05-28 DIAGNOSIS — D64.9 CHRONIC ANEMIA: ICD-10-CM

## 2024-05-28 PROCEDURE — 3078F DIAST BP <80 MM HG: CPT | Performed by: INTERNAL MEDICINE

## 2024-05-28 PROCEDURE — 1160F RVW MEDS BY RX/DR IN RCRD: CPT | Performed by: INTERNAL MEDICINE

## 2024-05-28 PROCEDURE — 99496 TRANSJ CARE MGMT HIGH F2F 7D: CPT | Performed by: INTERNAL MEDICINE

## 2024-05-28 PROCEDURE — 3008F BODY MASS INDEX DOCD: CPT | Performed by: INTERNAL MEDICINE

## 2024-05-28 PROCEDURE — 1111F DSCHRG MED/CURRENT MED MERGE: CPT | Performed by: INTERNAL MEDICINE

## 2024-05-28 PROCEDURE — 1125F AMNT PAIN NOTED PAIN PRSNT: CPT | Performed by: INTERNAL MEDICINE

## 2024-05-28 PROCEDURE — 1159F MED LIST DOCD IN RCRD: CPT | Performed by: INTERNAL MEDICINE

## 2024-05-28 PROCEDURE — 3074F SYST BP LT 130 MM HG: CPT | Performed by: INTERNAL MEDICINE

## 2024-05-28 NOTE — PROGRESS NOTES
Subjective:   Ollie Hernández is a 79 year old male who presents for hospital follow up.   He was discharged from [facility] to Home or Self Care  Admission Date: 5/21/24   Discharge Date: 5/22/24  Hospital Discharge Diagnosis: Coronary artery disease, abnormal stress test    Interactive contact within 2 business days post discharge first initiated on Date: 5/23/2024    During the visit, the following was completed:  Obtained and reviewed discharge summary, continuity of care documents, and Hospitalist notes  Reviewed Labs (CBC, CMP)    HPI:   Patient is a 79-year-old male past medical history of A-fib, coronary artery disease, hypertension, hyperlipidemia, CKD stage IV, type 2 diabetes who presents today for posthospitalization follow-up.     Of note, patient was admitted 5/21 - 5/22  For scheduled left heart catheterization.  Noted to have abnormal stress test, underwent scheduled left heart catheterization status post PCI of left circumflex.  Did well with the procedure and was monitored overnight.    Overall feeling well. Before the hospital, he felt fatigued and getting winded. This had been ongoing    Neck and back are still tight. Uses robaxin only uses.     BPs at home are good and at dialysis    BMs are good. Otherwise had been. Cardiac rehab will be ungdergoing soon.    The dysphagia has improved.    History/Other:   Current Medications:  Medication Reconciliation:  I am aware of an inpatient discharge within the last 30 days.  The discharge medication list has been reconciled with the patient's current medication list and reviewed by me.  See medication list for additions of new medication, and changes to current doses of medications and discontinued medications.  Outpatient Medications Marked as Taking for the 5/28/24 encounter (Office Visit) with Wili Parmar MD   Medication Sig    clopidogrel 75 MG Oral Tab Take 1 tablet (75 mg total) by mouth daily.    escitalopram 5 MG Oral Tab Take 1 tablet (5  mg total) by mouth daily.    carvedilol 25 MG Oral Tab Take 1 tablet (25 mg total) by mouth 2 (two) times daily.    atorvastatin 40 MG Oral Tab Take 1 tablet (40 mg total) by mouth nightly.    apixaban 5 MG Oral Tab Take 1 tablet (5 mg total) by mouth 2 (two) times daily.    acetaminophen 500 MG Oral Tab Take 1 tablet (500 mg total) by mouth daily as needed for Pain.    cetirizine 10 MG Oral Tab Take 1 tablet (10 mg total) by mouth every other day.    Cholecalciferol 125 MCG (5000 UT) Oral Tab Take 1 tablet (5,000 Units total) by mouth 2 (two) times daily.    felodipine ER 10 MG Oral Tablet 24 Hr Take 1 tablet (10 mg total) by mouth daily.    albuterol (PROAIR HFA) 108 (90 Base) MCG/ACT Inhalation Aero Soln Inhale 2 puffs into the lungs every 4 (four) hours as needed for Wheezing.    Budesonide-Formoterol Fumarate (SYMBICORT) 160-4.5 MCG/ACT Inhalation Aerosol Inhale 2 puffs into the lungs 2 (two) times daily. (Patient taking differently: Inhale 2 puffs into the lungs 2 (two) times daily as needed.)       Review of Systems:  GENERAL: weight stable, energy stable, no sweating  SKIN: denies any unusual skin lesions  EYES: denies blurred vision or double vision  HEENT: denies nasal congestion, sinus pain or ST  LUNGS: denies shortness of breath with exertion  CARDIOVASCULAR: denies chest pain on exertion or palpitations  GI: denies abdominal pain, denies heartburn, denies diarrhea  MUSCULOSKELETAL: denies pain, normal range of motion of extremities  NEURO: denies headaches, denies dizziness, denies weakness  PSYCHE: denies depression or anxiety  HEMATOLOGIC: denies hx of anemia, or bruising, denies bleeding  ENDOCRINE: denies thyroid history  ALL/ASTHMA: denies hx of allergy or asthma    Objective:   No LMP for male patient.  Estimated body mass index is 28.53 kg/m² as calculated from the following:    Height as of this encounter: 5' 3.5\" (1.613 m).    Weight as of this encounter: 163 lb 9.6 oz (74.2 kg).   /50    Pulse 72   Temp 98.5 °F (36.9 °C)   Ht 5' 3.5\" (1.613 m)   Wt 163 lb 9.6 oz (74.2 kg)   SpO2 97%   BMI 28.53 kg/m²    GENERAL: well developed, well nourished, in no apparent distress  SKIN: no rashes, no suspicious lesions  HEENT: atraumatic, normocephalic, ears and throat are clear; +R > L middle ear effusion  EYES: PERRLA, EOMI, conjunctiva are clear  NECK: supple, no adenopathy, no bruits  CHEST: no chest tenderness  LUNGS: clear to auscultation  CARDIO: RRR without murmur  GI: good BS's, no masses, HSM or tenderness  MUSCULOSKELETAL: back is not tender, FROM of the extremities  EXTREMITIES: no cyanosis, clubbing or edema  NEURO: Oriented times three, cranial nerves are intact, motor and sensory are grossly intact    Recent Results (from the past 8760 hour(s))   CBC, Platelet; No Differential    Collection Time: 05/22/24  9:19 AM   Result Value Ref Range    WBC 5.6 4.0 - 11.0 x10(3) uL    RBC 3.76 (L) 3.80 - 5.80 x10(6)uL    HGB 11.5 (L) 13.0 - 17.5 g/dL    HCT 33.8 (L) 39.0 - 53.0 %    MCV 89.9 80.0 - 100.0 fL    MCH 30.6 26.0 - 34.0 pg    MCHC 34.0 31.0 - 37.0 g/dL    RDW 17.3 (H) 11.0 - 15.0 %    RDW-SD 54.8 (H) 35.1 - 46.3 fL    .0 150.0 - 450.0 10(3)uL     *Note: Due to a large number of results and/or encounters for the requested time period, some results have not been displayed. A complete set of results can be found in Results Review.       Recent Results (from the past 8760 hour(s))   Basic Metabolic Panel (8)    Collection Time: 05/22/24  9:19 AM   Result Value Ref Range    Glucose 194 (H) 70 - 99 mg/dL    Sodium 138 136 - 145 mmol/L    Potassium 4.3 3.5 - 5.1 mmol/L    Chloride 100 98 - 112 mmol/L    CO2 30.0 21.0 - 32.0 mmol/L    Anion Gap 8 0 - 18 mmol/L    BUN 49 (H) 9 - 23 mg/dL    Creatinine 5.72 (H) 0.70 - 1.30 mg/dL    BUN/CREA Ratio 8.6 (L) 10.0 - 20.0    Calcium, Total 9.7 8.7 - 10.4 mg/dL    Calculated Osmolality 304 (H) 275 - 295 mOsm/kg    eGFR-Cr 10 (L) >=60 mL/min/1.73m2      *Note: Due to a large number of results and/or encounters for the requested time period, some results have not been displayed. A complete set of results can be found in Results Review.         Assessment & Plan:   1. Hospital discharge follow-up (Primary)  2. Type 2 diabetes mellitus with diabetic neuropathy, with long-term current use of insulin (HCC)  -     Comp Metabolic Panel (14); Future; Expected date: 05/28/2024  -     Hemoglobin A1C; Future; Expected date: 05/28/2024  3. Other dysphagia  4. Cervical radiculopathy  5. Paroxysmal atrial fibrillation (HCC)  6. KRAIG on CPAP  7. Chronic diastolic congestive heart failure (HCC)  8. Essential hypertension  9. Mixed hyperlipidemia  -     Lipid Panel; Future; Expected date: 05/28/2024  10. Type 2 diabetes mellitus with diabetic nephropathy, with long-term current use of insulin (HCC)  11. Vitamin B12 deficiency  -     Vitamin B12; Future; Expected date: 05/28/2024  12. Primary hypertension  13. Muscle cramping  14. Vitamin D deficiency  -     Vitamin D; Future; Expected date: 05/28/2024  15. Gout, unspecified cause, unspecified chronicity, unspecified site  -     Uric Acid; Future; Expected date: 05/28/2024  16. Screening for deficiency anemia  17. Screening for thyroid disorder  -     TSH W Reflex To Free T4; Future; Expected date: 05/28/2024  18. Screening for diabetes mellitus  19. Chronic anemia  -     CBC With Differential With Platelet; Future; Expected date: 05/28/2024  -     Iron And Tibc; Future; Expected date: 05/28/2024  -     Ferritin; Future; Expected date: 05/28/2024  20. Chronic otitis externa of left ear, unspecified type  -     ENT Referral - In Network    Abnormal stress test  Pt underwent LHC 5/21/2024 with Dr. Luis Orozco: 70% lesion of apical LAD, 80% lesion of LCx  S/p PCI of LCx  Plan for aspirin, plavix and eliquis for 1 week, then plavix plus eliquis  Medical management of LAD     Pharyngeal dysphagia  Does have right side hypomobile  oropharyngeal dysphagia  - swallow study 4/2024: intermittent shallow and deep laryngeal penetration without aspiration, small zenker's diverticulum, bulky endplate osteophytes contributing to dysphagia  - Will see Dr. Del Angel for further imaging     Paroxysmal A-fib  No prior diagnosis, reports that he was being seen in the outpatient clinic for 4 dialysis access concerns.  He was told he had A-fib and was advised to go to the emergency department, though he was sent [facility] to [facility].  - Seen by Dr. Phelan 10/9, abnormal 5-day Zio patch.  Eliquis for anticoagulation  - We will continue with carvedilol in the meantime  - Seen by Dr. Phelan 1/25/2024: Mildly abnormal nuclear stress test in presence of VT      Muscle cramping  Worse at nighttime sometimes experiences it after hemodialysis.  Poss related to fluid shifts, relative dehydration, electrolyte imbalance.  Lower suspicion for restless leg syndrome        Cervical radiculopathy  Chronic back pain with neuropathy  Has pain in the neck, back, resulting rate sided sciatica based on examination.  He does have some mild lower extremity decree sensation to temperature.  Multifactorial from degenerative disc disease, advanced osteoarthritis resulting in lumbar and cervical radiculopathy.  Does have some neuropathy from his history of diabetes  -Concern for unsteadiness, impaired gait with the need for use of cane.  -we will manage conservatively for now: Tylenol, aspirin as he has been taking.  Hesitant to use a muscle relaxer as this may increase his risk for falls  -I did review last CT scan of the cervical neck 7/2019 with history of cervical decompressive laminectomy with multilevel degenerative disc disease  - He did complete physical therapy in October  -I am recommending an MRI of the brain and cervical neck to determine if this is a more proximal cause of his sciatica.       Dizziness versus vertigo  Reports dizziness that is worsening over the last few  weeks.  This is causing unsteadiness, imbalance of gait.  Uses a cane at baseline and has not experienced any mechanical falls at home  - On examination, he does have an unsteady gait that is improved with the use of a cane.  He does have some decrease sensation in the lower extremities, possibly related to known lumbar spinal stenosis with sciatica  - We will check MRI brain - 12/29/2022 moderate chronic microvascular ischemic changes bilateral cerebral hemispheres.  Chronic microvascular ischemia in the basal ganglionic lesions and Alamine  - Seems to have remained stable     Chronic diastolic heart failure  -Last seen by cardiology, Dr. Jolly.  Maintained on current home medications.  - Home medications: Furosemide 40 mg daily, indapamide 2.5 mg daily, carvedilol 25 mg twice a day  - Continue following with cardiology     Type 2 diabetes complicated by neuropathy     Recent A1c:   HEMOGLOBIN A1C (%)   Date Value   04/02/2024 5.3     Recent urine microalbumin: No components found for: \"URINEMICROALBUMIN\"  Current medications: Lantus 20 U nightly, linagliptin 5 mg daily  Eye exam: May be due for diabetic eye examination soon - will request.   Foot exam: Check feet daily  - Gabapentin for neuropathy  - Nutrition optimization recommended  - Dexcom - started with good improvement of sugars     ESRD on hemodialysis  Possibly related to prolonged history of diabetes and hypertension  - Creatinine on last check 6.30, EGFR 9  - Follows with Dr. Martinez     Hypertension  - Continue with home carvedilol 25 mg twice a day, hydralazine 25mg q8h, amlodipine 10mg daily     Hyperlipidemia  - Continue with On atorvastatin 40 mg daily, aspirin     BPH  -Currently stable on tamsulosin 0.4 mg daily, trospium 20 mg twice a day     Pulmonary hypertension  -Comanagement of KRAIG and chronic diastolic heart failure  - Seems to be stable     KRAIG on CPAP  -Needs new CPAP  - Referral to pulm - Dr. Landeros, last seen 12/14/2023      Cataracts  -Seems to be stable, follows with Dr. Chase of ophthalmology     Gout  Flareup in 6/2022. NO recurrence  -Seems to be stable     Anemia of chronic disease  Status post IV Venofer, last dose 5/9/2022  -Baseline hemoglobin seems to be around 9-10  - Repeat CBC with hemoglobin stable at 9.6 per Care Everywhere  - Iron studies within normal limits.     Sensorineural hearing loss  Mild-moderate per audiology testing/14/2022.  He may be a hearing aid candidate in the future  -Remained stable at this time     Unsteady Gait  Ongoing fatigue due to multiple comorbidities as above  - We will request DME for a new walker, 2 wheeled-2 constipation.  Does not want a rollator walker at this time  - He is a fall risk with his unsteady gait, fatigue.  - This will allow adequate support to allow for improvement of ADLs, improvement in quality of life.     Anxiety  Lexapro 5 mg once a day.         Return in about 30 days (around 6/27/2024) for As scheduled.

## 2024-06-01 ENCOUNTER — HOSPITAL ENCOUNTER (INPATIENT)
Facility: HOSPITAL | Age: 79
LOS: 3 days | Discharge: HOME | End: 2024-06-06
Attending: EMERGENCY MEDICINE | Admitting: INTERNAL MEDICINE
Payer: MEDICARE

## 2024-06-01 DIAGNOSIS — Z99.2 ESRD (END STAGE RENAL DISEASE) ON DIALYSIS (HCC): ICD-10-CM

## 2024-06-01 DIAGNOSIS — K92.2 LOWER GI BLEED: Primary | ICD-10-CM

## 2024-06-01 DIAGNOSIS — N18.6 ESRD (END STAGE RENAL DISEASE) ON DIALYSIS (HCC): ICD-10-CM

## 2024-06-01 DIAGNOSIS — D50.0 ANEMIA, BLOOD LOSS: ICD-10-CM

## 2024-06-01 LAB
ALBUMIN SERPL-MCNC: 3.7 G/DL (ref 3.2–4.8)
ALBUMIN/GLOB SERPL: 1.2 {RATIO} (ref 1–2)
ALP LIVER SERPL-CCNC: 64 U/L
ALT SERPL-CCNC: 26 U/L
ANION GAP SERPL CALC-SCNC: 6 MMOL/L (ref 0–18)
AST SERPL-CCNC: 25 U/L (ref ?–34)
BASOPHILS # BLD AUTO: 0.04 X10(3) UL (ref 0–0.2)
BASOPHILS NFR BLD AUTO: 0.7 %
BILIRUB SERPL-MCNC: 0.4 MG/DL (ref 0.2–1.1)
BUN BLD-MCNC: 29 MG/DL (ref 9–23)
BUN/CREAT SERPL: 7 (ref 10–20)
CALCIUM BLD-MCNC: 9.6 MG/DL (ref 8.7–10.4)
CHLORIDE SERPL-SCNC: 100 MMOL/L (ref 98–112)
CO2 SERPL-SCNC: 32 MMOL/L (ref 21–32)
CREAT BLD-MCNC: 4.17 MG/DL
DEPRECATED RDW RBC AUTO: 61 FL (ref 35.1–46.3)
EGFRCR SERPLBLD CKD-EPI 2021: 14 ML/MIN/1.73M2 (ref 60–?)
EOSINOPHIL # BLD AUTO: 0.18 X10(3) UL (ref 0–0.7)
EOSINOPHIL NFR BLD AUTO: 3.2 %
ERYTHROCYTE [DISTWIDTH] IN BLOOD BY AUTOMATED COUNT: 17.6 % (ref 11–15)
GLOBULIN PLAS-MCNC: 3 G/DL (ref 2–3.5)
GLUCOSE BLD-MCNC: 164 MG/DL (ref 70–99)
GLUCOSE BLDC GLUCOMTR-MCNC: 117 MG/DL (ref 70–99)
GLUCOSE BLDC GLUCOMTR-MCNC: 118 MG/DL (ref 70–99)
GLUCOSE BLDC GLUCOMTR-MCNC: 123 MG/DL (ref 70–99)
GLUCOSE BLDC GLUCOMTR-MCNC: 124 MG/DL (ref 70–99)
GLUCOSE BLDC GLUCOMTR-MCNC: 187 MG/DL (ref 70–99)
HCT VFR BLD AUTO: 31.5 %
HCT VFR BLD AUTO: 32.7 %
HCT VFR BLD AUTO: 37.6 %
HGB BLD-MCNC: 10.1 G/DL
HGB BLD-MCNC: 11.6 G/DL
HGB BLD-MCNC: 9.8 G/DL
IMM GRANULOCYTES # BLD AUTO: 0.02 X10(3) UL (ref 0–1)
IMM GRANULOCYTES NFR BLD: 0.4 %
LYMPHOCYTES # BLD AUTO: 1.58 X10(3) UL (ref 1–4)
LYMPHOCYTES NFR BLD AUTO: 27.9 %
MCH RBC QN AUTO: 29.8 PG (ref 26–34)
MCHC RBC AUTO-ENTMCNC: 30.9 G/DL (ref 31–37)
MCV RBC AUTO: 96.5 FL
MONOCYTES # BLD AUTO: 0.49 X10(3) UL (ref 0.1–1)
MONOCYTES NFR BLD AUTO: 8.6 %
NEUTROPHILS # BLD AUTO: 3.36 X10 (3) UL (ref 1.5–7.7)
NEUTROPHILS # BLD AUTO: 3.36 X10(3) UL (ref 1.5–7.7)
NEUTROPHILS NFR BLD AUTO: 59.2 %
OSMOLALITY SERPL CALC.SUM OF ELEC: 295 MOSM/KG (ref 275–295)
PHOSPHATE SERPL-MCNC: 3.2 MG/DL (ref 2.4–5.1)
PLATELET # BLD AUTO: 220 10(3)UL (ref 150–450)
POTASSIUM SERPL-SCNC: 3.7 MMOL/L (ref 3.5–5.1)
PROT SERPL-MCNC: 6.7 G/DL (ref 5.7–8.2)
RBC # BLD AUTO: 3.39 X10(6)UL
SODIUM SERPL-SCNC: 138 MMOL/L (ref 136–145)
WBC # BLD AUTO: 5.7 X10(3) UL (ref 4–11)

## 2024-06-01 PROCEDURE — 99223 1ST HOSP IP/OBS HIGH 75: CPT | Performed by: INTERNAL MEDICINE

## 2024-06-01 PROCEDURE — 99222 1ST HOSP IP/OBS MODERATE 55: CPT | Performed by: INTERNAL MEDICINE

## 2024-06-01 RX ORDER — ALBUTEROL SULFATE 90 UG/1
2 AEROSOL, METERED RESPIRATORY (INHALATION) EVERY 4 HOURS PRN
Status: DISCONTINUED | OUTPATIENT
Start: 2024-06-01 | End: 2024-06-06

## 2024-06-01 RX ORDER — HYDROCORTISONE ACETATE 25 MG/1
25 SUPPOSITORY RECTAL 2 TIMES DAILY
Status: DISCONTINUED | OUTPATIENT
Start: 2024-06-01 | End: 2024-06-06

## 2024-06-01 RX ORDER — AMLODIPINE BESYLATE 10 MG/1
10 TABLET ORAL DAILY
Status: DISCONTINUED | OUTPATIENT
Start: 2024-06-01 | End: 2024-06-06

## 2024-06-01 RX ORDER — NICOTINE POLACRILEX 4 MG
15 LOZENGE BUCCAL
Status: DISCONTINUED | OUTPATIENT
Start: 2024-06-01 | End: 2024-06-06

## 2024-06-01 RX ORDER — ESCITALOPRAM OXALATE 5 MG/1
5 TABLET ORAL DAILY
Status: DISCONTINUED | OUTPATIENT
Start: 2024-06-01 | End: 2024-06-06

## 2024-06-01 RX ORDER — MELATONIN
3 NIGHTLY PRN
Status: DISCONTINUED | OUTPATIENT
Start: 2024-06-01 | End: 2024-06-06

## 2024-06-01 RX ORDER — PANTOPRAZOLE SODIUM 40 MG/1
40 TABLET, DELAYED RELEASE ORAL
Status: DISCONTINUED | OUTPATIENT
Start: 2024-06-01 | End: 2024-06-06

## 2024-06-01 RX ORDER — CLOPIDOGREL BISULFATE 75 MG/1
75 TABLET ORAL DAILY
Status: DISCONTINUED | OUTPATIENT
Start: 2024-06-01 | End: 2024-06-06

## 2024-06-01 RX ORDER — ACETAMINOPHEN 500 MG
1000 TABLET ORAL ONCE
Status: COMPLETED | OUTPATIENT
Start: 2024-06-01 | End: 2024-06-01

## 2024-06-01 RX ORDER — ASPIRIN 81 MG/1
81 TABLET ORAL DAILY
Status: DISCONTINUED | OUTPATIENT
Start: 2024-06-01 | End: 2024-06-04

## 2024-06-01 RX ORDER — HYDRALAZINE HYDROCHLORIDE 25 MG/1
25 TABLET, FILM COATED ORAL EVERY 8 HOURS SCHEDULED
Status: DISCONTINUED | OUTPATIENT
Start: 2024-06-01 | End: 2024-06-06

## 2024-06-01 RX ORDER — DEXTROSE MONOHYDRATE 25 G/50ML
50 INJECTION, SOLUTION INTRAVENOUS
Status: DISCONTINUED | OUTPATIENT
Start: 2024-06-01 | End: 2024-06-06

## 2024-06-01 RX ORDER — METHOCARBAMOL 500 MG/1
500 TABLET, FILM COATED ORAL 2 TIMES DAILY PRN
Status: DISCONTINUED | OUTPATIENT
Start: 2024-06-01 | End: 2024-06-06

## 2024-06-01 RX ORDER — FLUTICASONE FUROATE AND VILANTEROL 200; 25 UG/1; UG/1
1 POWDER RESPIRATORY (INHALATION) DAILY
Status: DISCONTINUED | OUTPATIENT
Start: 2024-06-01 | End: 2024-06-06

## 2024-06-01 RX ORDER — FLUTICASONE PROPIONATE 50 MCG
2 SPRAY, SUSPENSION (ML) NASAL DAILY
Status: DISCONTINUED | OUTPATIENT
Start: 2024-06-01 | End: 2024-06-06

## 2024-06-01 RX ORDER — ATORVASTATIN CALCIUM 40 MG/1
40 TABLET, FILM COATED ORAL NIGHTLY
Status: DISCONTINUED | OUTPATIENT
Start: 2024-06-01 | End: 2024-06-06

## 2024-06-01 RX ORDER — METOCLOPRAMIDE HYDROCHLORIDE 5 MG/ML
5 INJECTION INTRAMUSCULAR; INTRAVENOUS EVERY 8 HOURS PRN
Status: DISCONTINUED | OUTPATIENT
Start: 2024-06-01 | End: 2024-06-06

## 2024-06-01 RX ORDER — ONDANSETRON 2 MG/ML
4 INJECTION INTRAMUSCULAR; INTRAVENOUS EVERY 6 HOURS PRN
Status: DISCONTINUED | OUTPATIENT
Start: 2024-06-01 | End: 2024-06-06

## 2024-06-01 RX ORDER — NICOTINE POLACRILEX 4 MG
30 LOZENGE BUCCAL
Status: DISCONTINUED | OUTPATIENT
Start: 2024-06-01 | End: 2024-06-06

## 2024-06-01 RX ORDER — CARVEDILOL 25 MG/1
25 TABLET ORAL 2 TIMES DAILY
Status: DISCONTINUED | OUTPATIENT
Start: 2024-06-01 | End: 2024-06-06

## 2024-06-01 RX ORDER — TETRAHYDROZOLINE HCL 0.05 %
1 DROPS OPHTHALMIC (EYE) 2 TIMES DAILY PRN
Status: DISCONTINUED | OUTPATIENT
Start: 2024-06-01 | End: 2024-06-06

## 2024-06-01 RX ORDER — ACETAMINOPHEN 500 MG
500 TABLET ORAL EVERY 4 HOURS PRN
Status: DISCONTINUED | OUTPATIENT
Start: 2024-06-01 | End: 2024-06-06

## 2024-06-01 RX ORDER — CETIRIZINE HYDROCHLORIDE 5 MG/1
5 TABLET ORAL EVERY OTHER DAY
Status: DISCONTINUED | OUTPATIENT
Start: 2024-06-01 | End: 2024-06-06

## 2024-06-01 RX ORDER — TRAMADOL HYDROCHLORIDE 50 MG/1
50 TABLET ORAL EVERY 12 HOURS PRN
Status: DISCONTINUED | OUTPATIENT
Start: 2024-06-01 | End: 2024-06-06

## 2024-06-01 NOTE — ED INITIAL ASSESSMENT (HPI)
Pt brought to ed with c/o rectal bleeding. Pt staes the rectal bleeding started on Thursday and worsened tonight. Pt states he noticed dark red blood in the toilet tonight. Pt states he has a headache and is dizzy. Aox4, speaking in full sentences.    Dialysis done 5/31 at 10am.     Denies weakness or lightheaded. Denies abdominal pain.

## 2024-06-01 NOTE — H&P
[facility]  part of [facility]    History and Physical    Ollie Hernández Patient Status:  Observation    1945 MRN [id number]   Location [facility] 5SW/SE Attending Wili Parmar MD   Hosp Day # 0 PCP Wili Parmar MD     Date:  2024  Date of Admission:  2024    History provided by:patient  HPI:     Chief Complaint   Patient presents with    Anal Problem     HPI    The patient is a 79-year-old male with past medical history of coronary artery disease status post recent coronary stenting on 2024, currently on Plavix, paroxysmal atrial fibrillation on Eliquis, ESRD on hemodialysis, hypertension admitted with persistent rectal bleeding.    The patient notes onset of rectal bleeding beginning Thursday, 2024.  He notes dark red blood with each bowel movement and when wiping with the toilet paper.  He has a history of chronic constipation and has known internal hemorrhoids.  Last colonoscopy was in 2021 after he had noted rectal bleeding.  At that time he was noted to have 2 polyps and internal hemorrhoids.  The patient was started on fiber supplements at that time.  The patient admits to still being intermittently constipated and had perhaps been straining recently.    In the ED, digital rectal exam revealed bright red blood.  Hemoglobin was down slightly from baseline.  Vitals were otherwise stable.  The patient denies any chest pressure or shortness of breath.  He does note a lot of gas/flatus.    The pt notes a history some type of anorectal surgery in the  after sustaining an injury following a fall    Of note the patient underwent cardiac cath with recent stenting of the circumflex on 2024 and has been on Plavix since that time along with Eliquis for his atrial fibrillation.    History     Past Medical History:    Anemia    Asthma (HCC)    Back problem    BPH (benign prostatic hyperplasia)    Calculus of kidney    Cataract    Diabetes (HCC)     ESRD (end stage renal disease) on dialysis (HCC)    Essential hypertension    High blood pressure    High cholesterol    History of blood transfusion    Hyperlipidemia    Neuropathy    hands and feet    KRAIG on CPAP    Renal disorder    Sleep apnea    Visual impairment    glasses    Vocal cord paralysis, unilateral partial     Past Surgical History:   Procedure Laterality Date    Appendectomy          Back surgery      Neck/back -     Capsule endoscopy - internal referral      Cataract      2021 and 2022    Colonoscopy      Colonoscopy N/A 2021    Procedure: COLONOSCOPY;  Surgeon: Michael Gautam MD;  Location: [facility] ENDO    Hand/finger surgery unlisted      Accidental trauma    Upper gi endoscopy,diagnosis       Family History   Problem Relation Age of Onset    Cancer Father         Kidney    Breast Cancer Mother     Diabetes Mother     Diabetes Maternal Grandmother     Diabetes Maternal Grandfather     Heart Disorder Other         Uncle     Social History:  Social History     Socioeconomic History    Marital status:    Tobacco Use    Smoking status: Former     Current packs/day: 0.00     Average packs/day: 1 pack/day for 17.0 years (17.0 ttl pk-yrs)     Types: Cigarettes     Start date: 1964     Quit date: 1981     Years since quittin.4    Smokeless tobacco: Never   Vaping Use    Vaping status: Never Used   Substance and Sexual Activity    Alcohol use: No     Alcohol/week: 0.0 standard drinks of alcohol    Drug use: No    Sexual activity: Yes     Partners: Female     Social Determinants of Health     Financial Resource Strain: Low Risk  (2024)    Financial Resource Strain     Difficulty of Paying Living Expenses: Not hard at all     Med Affordability: No   Food Insecurity: No Food Insecurity (2024)    Food Insecurity     Food Insecurity: Never true   Transportation Needs: No Transportation Needs (2024)    Transportation Needs     Lack of Transportation: No    Housing Stability: Low Risk  (6/1/2024)    Housing Stability     Housing Instability: No     Allergies/Medications:   Allergies:   Allergies   Allergen Reactions    Adhesive Tape OTHER (SEE COMMENTS)     Severe rashes    Dust Mite Extract RASH     Medications Prior to Admission   Medication Sig    hydrALAZINE 25 MG Oral Tab Take 1 tablet (25 mg total) by mouth every 8 (eight) hours.    clopidogrel 75 MG Oral Tab Take 1 tablet (75 mg total) by mouth daily.    linaGLIPtin (TRADJENTA) 5 mg Oral Tab Take 1 tablet (5 mg total) by mouth daily.    escitalopram 5 MG Oral Tab Take 1 tablet (5 mg total) by mouth daily.    carvedilol 25 MG Oral Tab Take 1 tablet (25 mg total) by mouth 2 (two) times daily.    atorvastatin 40 MG Oral Tab Take 1 tablet (40 mg total) by mouth nightly.    apixaban 5 MG Oral Tab Take 1 tablet (5 mg total) by mouth 2 (two) times daily.    acetaminophen 500 MG Oral Tab Take 1 tablet (500 mg total) by mouth daily as needed for Pain.    cetirizine 10 MG Oral Tab Take 1 tablet (10 mg total) by mouth every other day.    Cholecalciferol 125 MCG (5000 UT) Oral Tab Take 1 tablet (5,000 Units total) by mouth 2 (two) times daily.    polyethylene glycol, PEG 3350, 17 g Oral Powd Pack 17 g Wed, Thurs, Sat    tetrahydrozoline 0.05 % Ophthalmic Solution 1 drop 2 (two) times daily as needed.    traMADol 50 MG Oral Tab Take 1 tablet (50 mg total) by mouth every 12 (twelve) hours as needed for Pain.    Glucose Blood (CONTOUR NEXT TEST) In Vitro Strip Test 3 times daily    felodipine ER 10 MG Oral Tablet 24 Hr Take 1 tablet (10 mg total) by mouth daily.    fluticasone propionate 50 MCG/ACT Nasal Suspension 2 sprays by Nasal route daily.    pantoprazole 40 MG Oral Tab EC Take 1 tablet (40 mg total) by mouth every morning before breakfast.    methocarbamol 500 MG Oral Tab Take 1 tablet (500 mg total) by mouth 2 (two) times daily as needed.    Insulin Pen Needle (PEN NEEDLES) 32G X 4 MM Does not apply Misc 1 each  daily.    albuterol (PROAIR HFA) 108 (90 Base) MCG/ACT Inhalation Aero Soln Inhale 2 puffs into the lungs every 4 (four) hours as needed for Wheezing.    Budesonide-Formoterol Fumarate (SYMBICORT) 160-4.5 MCG/ACT Inhalation Aerosol Inhale 2 puffs into the lungs 2 (two) times daily. (Patient taking differently: Inhale 2 puffs into the lungs 2 (two) times daily as needed.)    Darbepoetin Randell 60 MCG/ML Injection Solution Inject into the vein as needed.       Review of Systems:   Review of Systems    Physical Exam:   Vital Signs:  Blood pressure 159/60, pulse 67, temperature 97.9 °F (36.6 °C), temperature source Oral, resp. rate 18, height 5' 3\" (1.6 m), weight 157 lb 11.2 oz (71.5 kg), SpO2 100%.  Physical Exam  General: The patient is alert and oriented x 3 and in no acute distress  CV: Regular rate and rhythm, normal S1-S2, no gallops or murmurs  Lungs: Clear to auscultation bilaterally, no wheezing  Abdomen: Soft, slightly distended, nontender tender, normal bowel sounds  Extremities: No lower extremity edema  Rectal: No external hemorrhoids    Results:     Lab Results   Component Value Date    WBC 5.7 06/01/2024    HGB 9.8 (L) 06/01/2024    HCT 31.5 (L) 06/01/2024    .0 06/01/2024    CREATSERUM 4.17 (H) 06/01/2024    BUN 29 (H) 06/01/2024     06/01/2024    K 3.7 06/01/2024     06/01/2024    CO2 32.0 06/01/2024     (H) 06/01/2024    CA 9.6 06/01/2024    ALB 3.7 06/01/2024    ALKPHO 64 06/01/2024    BILT 0.4 06/01/2024    TP 6.7 06/01/2024    AST 25 06/01/2024    ALT 26 06/01/2024    T4F 0.9 08/31/2022    TSH 2.060 06/23/2023     (H) 07/25/2023    PSA 2.94 10/20/2021    ESRML 79 (H) 03/16/2016    CRP 0.36 03/16/2016    MG 2.1 11/20/2023    PHOS 2.1 (L) 02/20/2023    TROP <0.045 07/25/2019    TROPHS 25 04/25/2022     08/05/2023    B12 631 08/05/2023     No results found.        Assessment/Plan:       Rectal bleeding   Acute on chronic anemia  -pt with hx of  constipation/hemorrhoids/rectal bleeding in the past (saw GI Dr. Gautam for this in 10/2020)  -last colonoscopy 1/25/21 (Dr. Gautam) -- internal hemorrhoids, colon polyps x 2 (one tubular adenoma)  -Hgb 11.5 (on 5/22/24) > 10.1 (admission) >9.8  -pt had another BM this am with a small amount of blood  -plavix currently on hold but hesitate to hold for much longer given recent coronary stent  -eliqus on hold  -suspect internal hemorrhoidal bleed; no external hemorrhoids on exam  -will trend H/H  -empiric anusol HC suppository BID  -GI consulted    CAD  -[facility] 5/21/2024 by Dr. Luis Orozco: 70% lesion of apical LAD, 80% lesion of LCx  -S/p PCI of LCx (medical management of the LAD lesion)  -s/p aspirin, plavix and eliquis x 1 week, now just plavix and eliquis  -plavix and eliquis on hold for GI bleeding; would not hold plavix for much longer given recent nature of stent  -cardiology consulted     Pharyngeal dysphagia  --right side hypomobile oropharyngeal dysphagia  - swallow study 4/2024: intermittent shallow and deep laryngeal penetration without aspiration, small zenker's diverticulum, bulky endplate osteophytes contributing to dysphagia  - to see Dr. Del Angel (ENT at [place]) for further imaging     Paroxysmal A-fib  --dx'ed 9/2023  --Zio monitor 10/2023 -- NSVT (normal EF 65%); on carvedilol  - followed by EP Dr. Phelan  --on eliquis (held as above)     Cervical radiculopathy  Chronic back pain with neuropathy  -CT scan of the cervical neck 7/2019 ; history of cervical decompressive laminectomy with multilevel degenerative disc disease  - s/p physical therapy in the past  --MRI brain and MRI cervical spine done 12/29/22 (MRI brain w/mod chronic microvascular ischemic changes bilat cerebral hemispheres, basal ganglia and thalami; MRI cervical spine w/multilevel spinal stenosis)  --saw NS PA Dr. Araujo in 1/2023; had subsequent MRI thoracic/lumbar spine.  Sx attributed to myelopathy due to craniocervical junction  stenosis; surgery deemed high risk.  Pt was referred for PT in 2/2023.      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 Sevilla  --HgbA1c 5.5 in 9/2023  - Gabapentin for neuropathy  --at home: on Tradjenta 5mg daily (no longer on insulin)  -- SSI in hospital  --'s-120's     ESRD on hemodialysis  - 2/2 prolonged history of diabetes and hypertension  - Follows with Dr. Martinez     Hypertension  - carvedilol 25 mg BID, hydralazine 25mg q8h, felodipine 10mg/day (on amlodipine in hosp due to formulary)     Hyperlipidemia  - Continue with On atorvastatin 40 mg daily, aspirin     BPH  -no longer takes trospium or tamsulosin     Pulmonary hypertension  -Comanagement of KRAIG and chronic diastolic heart failure  - stable     KRAIG on CPAP  - sees pulm Dr. Landeros     Cataracts  -Seems to be stable, follows with Dr. Chase of ophthalmology     Gout  [place] in 6/2022. NO recurrence  -Seems to be stable     Anemia of ESRD  -Baseline hemoglobin seems to be around 10-11  -managed by nephrology; on aranesp  -Hgb decreased as above     Sensorineural hearing loss  Mild-moderate per audiology testing/14/2022.  He may be a hearing aid candidate in the future     Unsteady Gait  Ongoing fatigue due to multiple comorbidities as above  - uses a walker, 2 wheeled-2 constipation.  Does not want a rollator walker at this time  - He is a fall risk with his unsteady gait, fatigue.     Anxiety  Lexapro 5 mg once a day.                           Jeanne Grider MD  6/1/2024

## 2024-06-01 NOTE — ED QUICK NOTES
Orders for admission, patient is aware of plan and ready to go upstairs. Any questions, please call ED RN Gillian at extension [phone].     Patient Covid vaccination status: Fully vaccinated     COVID Test Ordered in ED: None    COVID Suspicion at Admission: N/A    Running Infusions:  None    Mental Status/LOC at time of transport: A&Ox4    Other pertinent information: Right arm restriction r/t dialysis   CIWA score: N/A   NIH score:  N/A

## 2024-06-01 NOTE — PLAN OF CARE
Vital signs stable upon admission. Call light in reach, safety measures in place.    Problem: Patient Centered Care  Goal: Patient preferences are identified and integrated in the patient's plan of care  Description: Interventions:  - What would you like us to know as we care for you?   - Provide timely, complete, and accurate information to patient/family  - Incorporate patient and family knowledge, values, beliefs, and cultural backgrounds into the planning and delivery of care  - Encourage patient/family to participate in care and decision-making at the level they choose  - Honor patient and family perspectives and choices  Outcome: Progressing     Problem: PAIN - ADULT  Goal: Verbalizes/displays adequate comfort level or patient's stated pain goal  Description: INTERVENTIONS:  - Encourage pt to monitor pain and request assistance  - Assess pain using appropriate pain scale  - Administer analgesics based on type and severity of pain and evaluate response  - Implement non-pharmacological measures as appropriate and evaluate response  - Consider cultural and social influences on pain and pain management  - Manage/alleviate anxiety  - Utilize distraction and/or relaxation techniques  - Monitor for opioid side effects  - Notify MD/LIP if interventions unsuccessful or patient reports new pain  - Anticipate increased pain with activity and pre-medicate as appropriate  Outcome: Progressing     Problem: RISK FOR INFECTION - ADULT  Goal: Absence of fever/infection during anticipated neutropenic period  Description: INTERVENTIONS  - Monitor WBC  - Administer growth factors as ordered  - Implement neutropenic guidelines  Outcome: Progressing     Problem: SAFETY ADULT - FALL  Goal: Free from fall injury  Description: INTERVENTIONS:  - Assess pt frequently for physical needs  - Identify cognitive and physical deficits and behaviors that affect risk of falls.  - [place] fall precautions as indicated by assessment.  - Educate  pt/family on patient safety including physical limitations  - Instruct pt to call for assistance with activity based on assessment  - Modify environment to reduce risk of injury  - Provide assistive devices as appropriate  - Consider OT/PT consult to assist with strengthening/mobility  - Encourage toileting schedule  Outcome: Progressing     Problem: DISCHARGE PLANNING  Goal: Discharge to home or other facility with appropriate resources  Description: INTERVENTIONS:  - Identify barriers to discharge w/pt and caregiver  - Include patient/family/discharge partner in discharge planning  - Arrange for needed discharge resources and transportation as appropriate  - Identify discharge learning needs (meds, wound care, etc)  - Arrange for interpreters to assist at discharge as needed  - Consider post-discharge preferences of patient/family/discharge partner  - Complete POLST form as appropriate  - Assess patient's ability to be responsible for managing their own health  - Refer to Case Management Department for coordinating discharge planning if the patient needs post-hospital services based on physician/LIP order or complex needs related to functional status, cognitive ability or social support system  Outcome: Progressing     Problem: HEMATOLOGIC - ADULT  Goal: Free from bleeding injury  Description: (Example usage: patient with low platelets)  INTERVENTIONS:  - Avoid intramuscular injections, enemas and rectal medication administration  - Ensure safe mobilization of patient  - Hold pressure on venipuncture sites to achieve adequate hemostasis  - Assess for signs and symptoms of internal bleeding  - Monitor lab trends  - Patient is to report abnormal signs of bleeding to staff  - Avoid use of toothpicks and dental floss  - Use electric shaver for shaving  - Use soft bristle tooth brush  - Limit straining and forceful nose blowing  Outcome: Progressing

## 2024-06-01 NOTE — PHYSICAL THERAPY NOTE
PHYSICAL THERAPY EVALUATION - INPATIENT     Room Number: 549/549-A  Evaluation Date: 6/1/2024  Type of Evaluation: Initial   Physician Order: PT Eval and Treat    Presenting Problem: lower GI bleed     Reason for Therapy: Mobility Dysfunction and Discharge Planning    PHYSICAL THERAPY ASSESSMENT   Patient is a 79 year old male admitted 6/1/2024 for lower GI bleed.  Prior to admission, patient's baseline is indepdent.  Patient is currently functioning near baseline with bed mobility, transfers, and gait.  Patient is requiring supervision as a result of the following impairments: decreased functional strength, decreased endurance/aerobic capacity, and pain.  Physical Therapy will continue to follow for duration of hospitalization.    Patient will benefit from continued skilled PT Services For duration of hospitalization, however, given the patient is functioning near baseline level do not anticipate skilled therapy needs at discharge .    PLAN  Patient has been evaluated and presents with no skilled Physical Therapy needs at this time.  Patient will be discharged from Physical Therapy services. Please re-order if a new functional limitation presents during this admission.    PHYSICAL THERAPY MEDICAL/SOCIAL HISTORY   History related to current admission: ER with rectal bleeding, weakness     Problem List  Principal Problem:    Lower GI bleed      HOME SITUATION  Home Situation  Type of Home: House  Home Layout: One level  Stairs to Enter : 5  Lives With: Spouse  Drives: Yes  Patient Owned Equipment: Rolling walker     Prior Level of [place]: live with wife in [place] step [place].  Indep with ADLs.  Has RW.    SUBJECTIVE  I'm feeling better    PHYSICAL THERAPY EXAMINATION   OBJECTIVE     Fall Risk: Standard fall risk    WEIGHT BEARING RESTRICTION                   PAIN ASSESSMENT  Rating: 3  Location: overall  Management Techniques: Activity promotion  COGNITION  Overall Cognitive Status:  WFL - within functional  limits    RANGE OF MOTION AND STRENGTH ASSESSMENT  Upper extremity ROM and strength are within functional limits except for the following:  BUE  Lower extremity ROM is within functional limits   Lower extremity strength is within functional limits     BALANCE  Static Sitting: Good  Dynamic Sitting: Good  Static Standing: Good  Dynamic Standing: Fair +    ADDITIONAL TESTS                                    NEUROLOGICAL FINDINGS                      ACTIVITY TOLERANCE                         O2 WALK       AM-PAC '6-Clicks' INPATIENT SHORT FORM - BASIC MOBILITY  How much difficulty does the patient currently have...  Patient Difficulty: Turning over in bed (including adjusting bedclothes, sheets and blankets)?: None   Patient Difficulty: Sitting down on and standing up from a chair with arms (e.g., wheelchair, bedside commode, etc.): None   Patient Difficulty: Moving from lying on back to sitting on the side of the bed?: None   How much help from another person does the patient currently need...   Help from Another: Moving to and from a bed to a chair (including a wheelchair)?: None   Help from Another: Need to walk in hospital room?: None   Help from Another: Climbing 3-5 steps with a railing?: A Little     AM-PAC Score:  Raw Score: 23   Approx Degree of Impairment: 11.2%   Standardized Score (AM-PAC Scale): 56.93   CMS Modifier (G-Code): CI    FUNCTIONAL ABILITY STATUS  Functional Mobility/Gait Assessment  Gait Assistance: Supervision  Distance (ft): 300  Assistive Device: Rolling walker  Pattern: Shuffle  Rolling: supervision  Supine to Sit: supervision  Sit to Supine: supervision  Sit to Stand: supervision    Exercise/Education Provided:  Bed mobility  Body mechanics  Energy conservation  Gait training  ROM    The patient's Approx Degree of Impairment: 11.2% has been calculated based on documentation in the [facility] '6 clicks' Inpatient Basic Mobility Short Form.  Research supports that patients with this level of  impairment may benefit from DC to home.  No skilled intervention required.  Final disposition will be made by interdisciplinary medical team.    Patient End of Session: Up in chair;Needs met;Call light within reach;RN aware of session/findings;All patient questions and concerns addressed    Patient was able to achieve the following ...   Patient able to transfer  Safely and independently    Patient able to ambulate on level surfaces  Safely and independently     Patient Evaluation Complexity Level:  History Moderate - 1 or 2 personal factors and/or co-morbidities   Examination of body systems Moderate - addressing a total of 3 or more elements   Clinical Presentation  Moderate - Evolving   Clinical Decision Making  Moderate Complexity     Gait Trainin minutes  Therapeutic Activity:  25 minutes  Neuromuscular Re-education: 0 minutes  Therapeutic Exercise: 0 minutes  [place] Repositionin minutes  Manual Therapy: 0 minutes  Can add/delete any of these

## 2024-06-01 NOTE — CONSULTS
GASTROENTEROLOGY CONSULTATION  [facility]    Ollie Hernández Patient Status:  Observation   Date of Birth 4/28/1945 MRN [id number]   Location [facility] 5SW/SE Attending Wili Parmar MD   Hosp Day # 0 PCP Wili Parmar MD     Date of Consultation:  6/1/2024    History of Present Illness:    Ollie Hernández is a 79 year old male with rectal bleeding.    The patient presented to the emergency room early this morning with complaints of rectal bleeding.  The patient noted onset of rectal bleeding 2 days ago.  He reported having a dark bloody bowel movement at that time.  He again had a another bloody bowel movement last night and this morning that were similar to his initial episode.    Of note, the patient recently had a cardiac stent placed.  He was discharged on aspirin, Plavix and Eliquis.  Prior to this, he was taking aspirin and Eliquis.  His last dose of Eliquis was last night.    The patient has a history of colon polyps.  In 2021, he had a colonoscopy.  Two polyps were removed. One polyp was a tubular adenoma.  He also had an EGD and colonoscopy in 2016 for evaluation of anemia.  The EGD revealed a hiatal hernia and his colonoscopy revealed internal hemorrhoids.  A capsule endoscopy also was done which was negative.    He denies hematemesis, abdominal pain or change in bowel function.    Past Medical History:     Past Medical History:    Anemia    Asthma (HCC)    Back problem    BPH (benign prostatic hyperplasia)    Calculus of kidney    Cataract    Diabetes (HCC)    ESRD (end stage renal disease) on dialysis (HCC)    Essential hypertension    High blood pressure    High cholesterol    History of blood transfusion    Hyperlipidemia    Neuropathy    hands and feet    KRAIG on CPAP    Renal disorder    Sleep apnea    Visual impairment    glasses    Vocal cord paralysis, unilateral partial       Medications:     Prior to Admission medications    Medication Sig Start Date End Date Taking?  Authorizing Provider   hydrALAZINE 25 MG Oral Tab Take 1 tablet (25 mg total) by mouth every 8 (eight) hours. 5/22/24   Ebonie Simmons DO   clopidogrel 75 MG Oral Tab Take 1 tablet (75 mg total) by mouth daily. 5/21/24   Luis Orozco MD   linaGLIPtin (TRADJENTA) 5 mg Oral Tab Take 1 tablet (5 mg total) by mouth daily. 3/11/24   Pushpa Sevilla MD   escitalopram 5 MG Oral Tab Take 1 tablet (5 mg total) by mouth daily. 2/29/24   Wili Parmar MD   carvedilol 25 MG Oral Tab Take 1 tablet (25 mg total) by mouth 2 (two) times daily. 10/9/23 10/8/24  External/Patient, Reported   atorvastatin 40 MG Oral Tab Take 1 tablet (40 mg total) by mouth nightly. 11/30/23   Wili Parmar MD   apixaban 5 MG Oral Tab Take 1 tablet (5 mg total) by mouth 2 (two) times daily.    External/Patient, Reported   acetaminophen 500 MG Oral Tab Take 1 tablet (500 mg total) by mouth daily as needed for Pain.    External/Patient, Reported   cetirizine 10 MG Oral Tab Take 1 tablet (10 mg total) by mouth every other day.    External/Patient, Reported   Cholecalciferol 125 MCG (5000 UT) Oral Tab Take 1 tablet (5,000 Units total) by mouth 2 (two) times daily.    External/Patient, Reported   polyethylene glycol, PEG 3350, 17 g Oral Powd Pack 17 g Wed, Thurs, Sat    External/Patient, Reported   tetrahydrozoline 0.05 % Ophthalmic Solution 1 drop 2 (two) times daily as needed.    External/Patient, Reported   traMADol 50 MG Oral Tab Take 1 tablet (50 mg total) by mouth every 12 (twelve) hours as needed for Pain.    External/Patient, Reported   Glucose Blood (CONTOUR NEXT TEST) In Vitro Strip Test 3 times daily 8/15/23   Pushpa Sevilla MD   felodipine ER 10 MG Oral Tablet 24 Hr Take 1 tablet (10 mg total) by mouth daily. 8/1/23   Wili Parmar MD   fluticasone propionate 50 MCG/ACT Nasal Suspension 2 sprays by Nasal route daily. 8/1/23   Wili Parmar MD   pantoprazole 40 MG Oral Tab EC Take 1 tablet (40 mg total) by mouth every morning before  breakfast. 8/1/23   Wili Parmar MD   methocarbamol 500 MG Oral Tab Take 1 tablet (500 mg total) by mouth 2 (two) times daily as needed. 8/1/23   Wili Parmar MD   Insulin Pen Needle (PEN NEEDLES) 32G X 4 MM Does not apply Misc 1 each daily. 5/8/23   Pushpa Sevilla MD   albuterol (PROAIR HFA) 108 (90 Base) MCG/ACT Inhalation Aero Soln Inhale 2 puffs into the lungs every 4 (four) hours as needed for Wheezing. 3/9/23   Wili Parmar MD   Budesonide-Formoterol Fumarate (SYMBICORT) 160-4.5 MCG/ACT Inhalation Aerosol Inhale 2 puffs into the lungs 2 (two) times daily.  Patient taking differently: Inhale 2 puffs into the lungs 2 (two) times daily as needed. 3/9/23   Wili Parmar MD   Darbepoetin Randell 60 MCG/ML Injection Solution Inject into the vein as needed. 4/21/16   External/Patient, Reported       Current Facility-Administered Medications   Medication Dose Route Frequency    albuterol (Ventolin HFA) 108 (90 Base) MCG/ACT inhaler 2 puff  2 puff Inhalation Q4H PRN    atorvastatin (Lipitor) tab 40 mg  40 mg Oral Nightly    fluticasone furoate-vilanterol (Breo Ellipta) 200-25 MCG/ACT inhaler 1 puff  1 puff Inhalation Daily    carvedilol (Coreg) tab 25 mg  25 mg Oral BID    cetirizine (ZyrTEC) tab 5 mg  5 mg Oral QOD    cholecalciferol (Vitamin D3) tab 5,000 Units  5,000 Units Oral BID    escitalopram (Lexapro) tab 5 mg  5 mg Oral Daily    amLODIPine (Norvasc) tab 10 mg  10 mg Oral Daily    fluticasone propionate (Flonase) 50 MCG/ACT nasal suspension 2 spray  2 spray Nasal Daily    hydrALAZINE (Apresoline) tab 25 mg  25 mg Oral Q8H STEPHANIE    methocarbamol (Robaxin) tab 500 mg  500 mg Oral BID PRN    pantoprazole (Protonix) DR tab 40 mg  40 mg Oral QAM AC    traMADol (Ultram) tab 50 mg  50 mg Oral Q12H PRN    tetrahydrozoline (Visine) 0.05 % ophthalmic solution 1 drop  1 drop Both Eyes BID PRN    glucose (Dex4) 15 GM/59ML oral liquid 15 g  15 g Oral Q15 Min PRN    Or    glucose (Glutose) 40% oral gel 15 g  15 g Oral  Q15 Min PRN    Or    glucose-vitamin C (Dex-4) chewable tab 4 tablet  4 tablet Oral Q15 Min PRN    Or    dextrose 50% injection 50 mL  50 mL Intravenous Q15 Min PRN    Or    glucose (Dex4) 15 GM/59ML oral liquid 30 g  30 g Oral Q15 Min PRN    Or    glucose (Glutose) 40% oral gel 30 g  30 g Oral Q15 Min PRN    Or    glucose-vitamin C (Dex-4) chewable tab 8 tablet  8 tablet Oral Q15 Min PRN    acetaminophen (Tylenol Extra Strength) tab 500 mg  500 mg Oral Q4H PRN    melatonin tab 3 mg  3 mg Oral Nightly PRN    ondansetron (Zofran) 4 MG/2ML injection 4 mg  4 mg Intravenous Q6H PRN    metoclopramide (Reglan) 5 mg/mL injection 5 mg  5 mg Intravenous Q8H PRN    insulin aspart (NovoLOG) 100 Units/mL FlexPen 1-5 Units  1-5 Units Subcutaneous TID CC    hydrocortisone (Anusol-HC) 25 MG rectal suppository 25 mg  25 mg Rectal BID        Family History:     Family History   Problem Relation Age of Onset    Cancer Father         Kidney    Breast Cancer Mother     Diabetes Mother     Diabetes Maternal Grandmother     Diabetes Maternal Grandfather     Heart Disorder Other         Uncle       Social History:    Social History     Socioeconomic History    Marital status:    Tobacco Use    Smoking status: Former     Current packs/day: 0.00     Average packs/day: 1 pack/day for 17.0 years (17.0 ttl pk-yrs)     Types: Cigarettes     Start date: 1964     Quit date: 1981     Years since quittin.4    Smokeless tobacco: Never   Vaping Use    Vaping status: Never Used   Substance and Sexual Activity    Alcohol use: No     Alcohol/week: 0.0 standard drinks of alcohol    Drug use: No    Sexual activity: Yes     Partners: Female     Social Determinants of Health     Financial Resource Strain: Low Risk  (2024)    Financial Resource Strain     Difficulty of Paying Living Expenses: Not hard at all     Med Affordability: No   Food Insecurity: No Food Insecurity (2024)    Food Insecurity     Food Insecurity: Never true    Transportation Needs: No Transportation Needs (6/1/2024)    Transportation Needs     Lack of Transportation: No   Housing Stability: Low Risk  (6/1/2024)    Housing Stability     Housing Instability: No       ROS:     A comprehensive 10 point review of systems was completed.  Pertinent positives and negatives noted in the the HPI.      Physical Exam:    Vital Signs:   Vitals:    06/01/24 0938   BP: 159/60   Pulse: 67   Resp: 18   Temp: 97.9 °F (36.6 °C)     General: Alert, oriented and in no acute distress  HEENT: normocephalic; non-icteric; oral cavity free of lesions  Neck:  Supple; no thyromegaly or adenopathy  Lungs:  Clear to ausculation and percussion  Heart:  Normal S1S2  Abdomen:  Soft; non-tender; no masses or hepatosplenomegaly; non-distended  Extremities:  No edema    Labs:      Lab Results   Component Value Date    WBC 5.7 06/01/2024    HGB 9.8 06/01/2024    HCT 31.5 06/01/2024    .0 06/01/2024    CREATSERUM 4.17 06/01/2024    BUN 29 06/01/2024     06/01/2024    K 3.7 06/01/2024     06/01/2024    CO2 32.0 06/01/2024     06/01/2024    CA 9.6 06/01/2024    ALB 3.7 06/01/2024    ALKPHO 64 06/01/2024    BILT 0.4 06/01/2024    TP 6.7 06/01/2024    AST 25 06/01/2024    ALT 26 06/01/2024    PGLU 117 06/01/2024         Assessment:    1.  GI bleed  2.  History of colon polyps  3.  Hiatal hernia    The patient presents with rectal bleeding for the past 2 days.  He is currently on aspirin, Plavix and Eliquis.  This could be affecting his bleeding.  He has a history of colon polyps and a hiatal hernia.  His last colonoscopy was in 2021 and EGD in 2016.    He is currently hemodynamically stable.  A slight drop in hemoglobin has been noted since 5/22/2024.    The etiology of his GI bleeding is unclear.  Both upper and lower GI etiologies should be considered.  I also am concerned about the possibility of AVMs given his previous history of anemia, cardiac disease, renal failure and his  age.      Plan:    Hold Eliquis.  Follow hemoglobin.  EGD and colonoscopy on 6/3/24. The procedure was reviewed with the patient. The patient is aware of the risks, including bleeding, perforation and anesthetic complications. The limitations of the procedure were reviewed. The patient agrees and all questions were answered.       Emilio Talley MD

## 2024-06-01 NOTE — ED PROVIDER NOTES
Patient Seen in: [facility] Emergency Department      History     Chief Complaint   Patient presents with    Anal Problem     Stated Complaint: rectal bleeding    Subjective:   HPI    79-year-old male with history of diabetes, CKD on dialysis, CHF, hypertension, hyperlipidemia presenting to the emergency department due to concern for rectal bleeding.    Patient states that rectal bleeding has been noted since Thursday in a consistent manner with each bowel movement.  Patient states that blood is noted to be darker red in appearance.  States that it is noted in the stool and toilet paper after wiping.  States that he is not having any abdominal pain complaints.  States that he has not had any change in his bowel habits with regards to frequency or consistency.  Does not detail any trauma.  Does not detail any pain with defecation.    Of note patient did have recent cardiac stenting and is on blood thinning medications in addition to antiplatelet medications.    Patient does report headache which is been noted after travel and EMS.  States that it was gradual in onset.  Reported 1 episode of dizziness after getting out of the EMS vehicle states that this subsided and does not have dizziness complaints at this time.  Otherwise patient denies fever, chest pain, shortness of breath, N/V/C/D, dysuria or hematuria.    Objective:   Past Medical History:    Anemia    Asthma (HCC)    Back problem    BPH (benign prostatic hyperplasia)    Calculus of kidney    Cataract    Diabetes (HCC)    ESRD (end stage renal disease) on dialysis (HCC)    Essential hypertension    High blood pressure    High cholesterol    History of blood transfusion    Hyperlipidemia    Neuropathy    hands and feet    KRAIG on CPAP    Renal disorder    Sleep apnea    Visual impairment    glasses    Vocal cord paralysis, unilateral partial              Past Surgical History:   Procedure Laterality Date    Appendectomy      1981    Back surgery       Neck/back -     Capsule endoscopy - internal referral      Cataract      2021 and 2022    Colonoscopy      Colonoscopy N/A 2021    Procedure: COLONOSCOPY;  Surgeon: Michael Gautam MD;  Location: [facility] ENDO    Hand/finger surgery unlisted      Accidental trauma    Upper gi endoscopy,diagnosis                  Social History     Socioeconomic History    Marital status:    Tobacco Use    Smoking status: Former     Current packs/day: 0.00     Average packs/day: 1 pack/day for 17.0 years (17.0 ttl pk-yrs)     Types: Cigarettes     Start date: 1964     Quit date: 1981     Years since quittin.4    Smokeless tobacco: Never   Vaping Use    Vaping status: Never Used   Substance and Sexual Activity    Alcohol use: No     Alcohol/week: 0.0 standard drinks of alcohol    Drug use: No    Sexual activity: Yes     Partners: Female     Social Determinants of Health     Financial Resource Strain: Low Risk  (2024)    Financial Resource Strain     Difficulty of Paying Living Expenses: Not hard at all     Med Affordability: No   Food Insecurity: No Food Insecurity (2024)    Food Insecurity     Food Insecurity: Never true   Transportation Needs: No Transportation Needs (2024)    Transportation Needs     Lack of Transportation: No   Housing Stability: Low Risk  (2024)    Housing Stability     Housing Instability: No              Review of Systems    Positive for stated complaint: rectal bleeding  Other systems are as noted in HPI.  Constitutional and vital signs reviewed.      All other systems reviewed and negative except as noted above.    Physical Exam     ED Triage Vitals [24 0202]   /58   Pulse 74   Resp 20   Temp 98.4 °F (36.9 °C)   Temp src Temporal   SpO2 99 %   O2 Device None (Room air)       Current Vitals:   Vital Signs  BP: 156/64  Pulse: 68  Resp: 18  Temp: 98.4 °F (36.9 °C)  Temp src: Oral  MAP (mmHg): 90    Oxygen Therapy  SpO2: 99 %  O2 Device: None (Room  air)  Pulse Oximetry Type: Intermittent            Physical Exam    Physical Exam:   /64 (BP Location: Left arm)   Pulse 68   Temp 98.4 °F (36.9 °C) (Oral)   Resp 18   Ht 160 cm (5' 3\")   Wt 71.5 kg   SpO2 99%   BMI 27.94 kg/m²  - I reviewed these vital signs    Constitutional: Pt is well appearing, in no distress  HEENT: Normocephalic/Atraumatic, PERRL, EOM grossly normal, Conjunctiva Clear, MMM without Erythema or Lesions, Uvula midline, No Palatal/Oropharyngeal Erythema/Lesions noted  Neck: ROM intact  Lungs: CTA B, No crepitus, Talking in full sentences in no respiratory distress  Cardiovascular: RRR: yes  Abdominal: Normoactive BSs, No rebound or guarding, soft, non-distended, no masses, no discomfort to palpation   Back: No midline ttp  Rectal: Exam performed with chaperone at the bedside, no external abnormalities seen on exam, no hemorrhoids or fissures seen, no signs of trauma, red feces noted after CARLOS  : deferred  Musculoskeletal: No deformities noted, No cyanosis/clubbing noted, No tenderness to palpation  Neurologic: A&O x 3, Speech clear, No facial asymmetry noted, Equal strength and sensation in extremities appreciated  Psychiatric: Mood and affect are appropriate, Speech not pressured, Thought process is logical  Skin: Warm and dry, No rash      ED Course     Labs Reviewed   COMP METABOLIC PANEL (14) - Abnormal; Notable for the following components:       Result Value    Glucose 164 (*)     BUN 29 (*)     Creatinine 4.17 (*)     BUN/CREA Ratio 7.0 (*)     eGFR-Cr 14 (*)     All other components within normal limits   HEMOGLOBIN + HEMATOCRIT - Abnormal; Notable for the following components:    HGB 9.8 (*)     HCT 31.5 (*)     All other components within normal limits   POCT GLUCOSE - Abnormal; Notable for the following components:    POC Glucose  123 (*)     All other components within normal limits   POCT GLUCOSE - Abnormal; Notable for the following components:    POC Glucose  117 (*)      All other components within normal limits   CBC W/ DIFFERENTIAL - Abnormal; Notable for the following components:    RBC 3.39 (*)     HGB 10.1 (*)     HCT 32.7 (*)     MCHC 30.9 (*)     RDW-SD 61.0 (*)     RDW 17.6 (*)     All other components within normal limits   CBC WITH DIFFERENTIAL WITH PLATELET    Narrative:     The following orders were created for panel order CBC With Differential With Platelet.  Procedure                               Abnormality         Status                     ---------                               -----------         ------                     CBC W/ DIFFERENTIAL[[id number]]          Abnormal            Final result                 Please view results for these tests on the individual orders.   RAINBOW DRAW LAVENDER   RAINBOW DRAW LIGHT GREEN   RAINBOW DRAW BLUE          MDM          Medical Decision Making  79-year-old male presenting to the emergency department due to concern for rectal bleeding.    On arrival to the emergency department patient overall well-appearing and stable with no need for acute intervention.    Differential diagnosis for the patient close following is not limited to: Lower GI bleeding, anemia due to acute blood loss, tension type headache, dehydration.  Do not suspect acute abdomen based off exam as detailed above.  Do not suspect subarachnoid hemorrhage as patient reports headache symptoms beginning gradual onset and worsening steadily with no sudden onset nature detailed.    ED Course as of 06/01/24 0937  ------------------------------------------------------------  Time: 06/01 0325  Comment: CBC without leukocytosis.  Patient afebrile.  Do not suspect acute infection.  Hemoglobin decreased to 10.1 from previous results 11.5.  Platelets within normal limits.  ------------------------------------------------------------  Time: 06/01 0326  Comment: CMP creatinine at 4.17.  Patient is a known ESRD patient on dialysis.  No acute electrolyte abnormality  noted otherwise.  ------------------------------------------------------------  Time: 06/01 0343  Value: Hemoglobin(!): 9.8  Comment: (Reviewed)     Due to persistent hemoglobin dropping with CARLOS showing evidence of blood suspect lower GI bleed as a cause for the patient's presentation.  Anemia likely due to blood loss.  Suspect tension type headache as a cause of the patient's headache symptoms.    Have discussed with admitting physician who is agreeable for plan for admission.  Have discussed with cardiology given recent stenting in addition to nephrology given history of ESRD.  Gastroenterology also discussed for consult.    Amount and/or Complexity of Data Reviewed  Independent Historian: caregiver  External Data Reviewed: notes.  Labs: ordered. Decision-making details documented in ED Course.  Discussion of management or test interpretation with external provider(s): Admitting physician, cardiology, nephrology, gastroenterology    Risk  Decision regarding hospitalization.        Disposition and Plan     Clinical Impression:  1. Lower GI bleed         Disposition:  Admit  6/1/2024  4:08 am    Follow-up:  No follow-up provider specified.  We recommend that you schedule follow up care with a primary care provider within the next three months to obtain basic health screening including reassessment of your blood pressure.      Medications Prescribed:  Current Discharge Medication List                            Hospital Problems       Present on Admission  Date Reviewed: 5/28/2024            ICD-10-CM Noted POA    * (Principal) Lower GI bleed K92.2 6/1/2024 Unknown

## 2024-06-01 NOTE — CONSULTS
[facility] Cardiology  Consultation Note      Ollie Hernández Patient Status:  Observation    1945 MRN [id number]   Location [facility] 5SW/SE Attending Wili Parmar MD   Hosp Day # 0 PCP Wili Parmar MD     Reason for consult: GIB, recent PCI    Primary cardiologist: Herman Phelan MD     History of Present Illness:  Ollie Hernández is a 79 year old male with CAD and PCI to LCX (24, abnormal stress test, obtained due to recurrent NSVT on Zio patch), PAF on Eliquis, chronic HFpEF, ESRD on HD, was on ASA, plavix, Eliquis x 1 week post PCI then stopped ASA - who presented to [facility] on 2024 with rectal bleeding x 3 days.  Hgb 11.5 -> 9.8. Last blood in stool this morning. Denies CP, SOB.     Medications:  Current Facility-Administered Medications   Medication Dose Route Frequency    albuterol (Ventolin HFA) 108 (90 Base) MCG/ACT inhaler 2 puff  2 puff Inhalation Q4H PRN    atorvastatin (Lipitor) tab 40 mg  40 mg Oral Nightly    fluticasone furoate-vilanterol (Breo Ellipta) 200-25 MCG/ACT inhaler 1 puff  1 puff Inhalation Daily    carvedilol (Coreg) tab 25 mg  25 mg Oral BID    cetirizine (ZyrTEC) tab 5 mg  5 mg Oral QOD    cholecalciferol (Vitamin D3) tab 5,000 Units  5,000 Units Oral BID    escitalopram (Lexapro) tab 5 mg  5 mg Oral Daily    amLODIPine (Norvasc) tab 10 mg  10 mg Oral Daily    fluticasone propionate (Flonase) 50 MCG/ACT nasal suspension 2 spray  2 spray Nasal Daily    hydrALAZINE (Apresoline) tab 25 mg  25 mg Oral Q8H STEPHANIE    methocarbamol (Robaxin) tab 500 mg  500 mg Oral BID PRN    pantoprazole (Protonix) DR tab 40 mg  40 mg Oral QAM AC    traMADol (Ultram) tab 50 mg  50 mg Oral Q12H PRN    tetrahydrozoline (Visine) 0.05 % ophthalmic solution 1 drop  1 drop Both Eyes BID PRN    glucose (Dex4) 15 GM/59ML oral liquid 15 g  15 g Oral Q15 Min PRN    Or    glucose (Glutose) 40% oral gel 15 g  15 g Oral Q15 Min PRN    Or    glucose-vitamin C (Dex-4) chewable  tab 4 tablet  4 tablet Oral Q15 Min PRN    Or    dextrose 50% injection 50 mL  50 mL Intravenous Q15 Min PRN    Or    glucose (Dex4) 15 GM/59ML oral liquid 30 g  30 g Oral Q15 Min PRN    Or    glucose (Glutose) 40% oral gel 30 g  30 g Oral Q15 Min PRN    Or    glucose-vitamin C (Dex-4) chewable tab 8 tablet  8 tablet Oral Q15 Min PRN    acetaminophen (Tylenol Extra Strength) tab 500 mg  500 mg Oral Q4H PRN    melatonin tab 3 mg  3 mg Oral Nightly PRN    ondansetron (Zofran) 4 MG/2ML injection 4 mg  4 mg Intravenous Q6H PRN    metoclopramide (Reglan) 5 mg/mL injection 5 mg  5 mg Intravenous Q8H PRN    insulin aspart (NovoLOG) 100 Units/mL FlexPen 1-5 Units  1-5 Units Subcutaneous TID CC    hydrocortisone (Anusol-HC) 25 MG rectal suppository 25 mg  25 mg Rectal BID    clopidogrel (Plavix) tab 75 mg  75 mg Oral Daily    aspirin DR tab 81 mg  81 mg Oral Daily       Past Medical History:    Anemia    Asthma (HCC)    Back problem    BPH (benign prostatic hyperplasia)    Calculus of kidney    Cataract    Diabetes (HCC)    ESRD (end stage renal disease) on dialysis (HCC)    Essential hypertension    High blood pressure    High cholesterol    History of blood transfusion    Hyperlipidemia    Neuropathy    hands and feet    KRAIG on CPAP    Renal disorder    Sleep apnea    Visual impairment    glasses    Vocal cord paralysis, unilateral partial       Past Surgical History:   Procedure Laterality Date    Appendectomy      1981    Back surgery      Neck/back - 1998    Capsule endoscopy - internal referral      Cataract      12/2021 and 1/2022    Colonoscopy      Colonoscopy N/A 1/25/2021    Procedure: COLONOSCOPY;  Surgeon: Michael Gautam MD;  Location: [facility] ENDO    Hand/finger surgery unlisted      Accidental trauma    Upper gi endoscopy,diagnosis         Family History  family history includes Breast Cancer in his mother; Cancer in his father; Diabetes in his maternal grandfather, maternal grandmother, and mother; Heart  Disorder in an other family member.    Social History   reports that he quit smoking about 43 years ago. His smoking use included cigarettes. He started smoking about 60 years ago. He has a 17 pack-year smoking history. He has never used smokeless tobacco. He reports that he does not drink alcohol and does not use drugs.     Allergies  Allergies   Allergen Reactions    Adhesive Tape OTHER (SEE COMMENTS)     Severe rashes    Dust Mite Extract RASH       Review of Systems:  As per HPI, otherwise 10 point ROS is negative in detail.    Physical Exam:  Blood pressure (!) 170/64, pulse 72, temperature 97.9 °F (36.6 °C), temperature source Oral, resp. rate 17, height 63\", weight 157 lb 11.2 oz (71.5 kg), SpO2 100%.  Temp (24hrs), Av.2 °F (36.8 °C), Min:97.9 °F (36.6 °C), Max:98.4 °F (36.9 °C)    Wt Readings from Last 3 Encounters:   24 157 lb 11.2 oz (71.5 kg)   24 163 lb 9.6 oz (74.2 kg)   24 152 lb 1.6 oz (69 kg)       General: Awake and alert; in no acute distress  HEENT: Extraocular movements are intact; sclerae are anicteric; scalp is atraumatic  Neck: Supple; no JVD; no carotid bruits  Cardiac: Regular rate and regular rhythm; normal S1 and S2, no murmurs, rubs, or gallops are appreciated  Lungs: Clear to auscultation bilaterally; no accessory muscle use is noted, no wheezes, rhonci or rales  Abdomen: Soft, non-distended, non-tender; bowel sounds are normoactive  Extremities: Warm, no edema, clubbing or cyanosis; moves all 4 extremities normally, distal pulses intact and equal  Psychiatric: Normal mood and affect; answers questions appropriately  Dermatologic: No rashes; normal skin turgor    Diagnostic testing:    Labs:   No results found for: \"PT\", \"INR\"     Lab Results   Component Value Date    WBC 5.7 2024    HGB 9.8 2024    HCT 31.5 2024    .0 2024    CREATSERUM 4.17 2024    BUN 29 2024     2024    K 3.7 2024     2024     CO2 32.0 06/01/2024     06/01/2024    CA 9.6 06/01/2024    ALB 3.7 06/01/2024    ALKPHO 64 06/01/2024    BILT 0.4 06/01/2024    TP 6.7 06/01/2024    AST 25 06/01/2024    ALT 26 06/01/2024    PGLU 124 06/01/2024       Cardiac diagnostics:    Cath 5/21/24: LM 20%, LAD 70% apex, LCX 80%, RCA small, s/p ABIMBOLA x 1 LCX (2.75 x 15 mm)    Impression:  79 year old male admitted with rectal bleeding    ABLA Hgb 11.5 (5/22/24) -> 9.8   CAD - PCI LCX with ABIMBOLA x 1 (5/21/24)  PAF on Eliquis  NSVT on Zio patch  Chronic HFpEF - compensated  ESRD on HD MWF  DM2  HTN  KRAIG on CPAP  LVEF 60% (echo 9/23)    Recommendations:  Risk of life threatening stent thrombosis outweighs risk of life threatening hemorrhage at present. Restart plavix and aspirin.  Follow Hgb closely and transfuse for Hgb < 8 given CAD. Plan for scope on Monday.  Can continue holding Eliquis. Consider Watchman in the future.   Currently hypertensive, continue coreg, norvasc and hydralazine cautiously.     Thank you for allowing our practice to participate in the care of your patient. Please do not hesitate to contact me if you have any questions.    Juan Moore MD  Interventional Cardiology  [facility]  Office: [phone]    6/1/2024  4:26 PM    Total encounter time 75 minutes.

## 2024-06-01 NOTE — CONSULTS
[facility]  part of [facility]    Report of Consultation    Date of Admission:  6/1/2024  Date of Consult:  6/1/2024   Reason for Consultation:     ESRD on HD     History of Present Illness:   Patient is a 79 year old male with past medical history of coronary artery disease s/p PCI on 5/21, A-fib on anticoagulation, ESRD on hemodialysis Monday Wednesday Friday via AV fistula, hypertension who presented to the emergency room for rectal bleeding and maroon-colored stools    Patient received dialysis at [facility] on Monday Wednesday Friday.  Follows with Dr. Clemente/Dr. Jain.  Patient underwent cardiac cath on 5/21 and has been on Plavix.  Also on Eliquis for A-fib    Wife at bedside.  Had maroon-colored stool this morning.  Denies any chest pain or abdominal pain or nausea vomiting.  Tolerated HD well yesterday      Past Medical History  Past Medical History:    Anemia    Asthma (HCC)    Back problem    BPH (benign prostatic hyperplasia)    Calculus of kidney    Cataract    Diabetes (HCC)    ESRD (end stage renal disease) on dialysis (HCC)    Essential hypertension    High blood pressure    High cholesterol    History of blood transfusion    Hyperlipidemia    Neuropathy    hands and feet    KRAIG on CPAP    Renal disorder    Sleep apnea    Visual impairment    glasses    Vocal cord paralysis, unilateral partial       Past Surgical History  Past Surgical History:   Procedure Laterality Date    Appendectomy      1981    Back surgery      Neck/back - 1998    Capsule endoscopy - internal referral      Cataract      12/2021 and 1/2022    Colonoscopy      Colonoscopy N/A 1/25/2021    Procedure: COLONOSCOPY;  Surgeon: Michael Gautam MD;  Location: [facility] ENDO    Hand/finger surgery unlisted      Accidental trauma    Upper gi endoscopy,diagnosis         Family History  Family History   Problem Relation Age of Onset    Cancer Father         Kidney    Breast Cancer Mother     Diabetes Mother      Diabetes Maternal Grandmother     Diabetes Maternal Grandfather     Heart Disorder Other         Uncle       Social History  Pediatric History   Patient Parents    Not on file     Other Topics Concern    Not on file   Social History Narrative    Not on file           Current Medications:  Current Facility-Administered Medications   Medication Dose Route Frequency    albuterol (Ventolin HFA) 108 (90 Base) MCG/ACT inhaler 2 puff  2 puff Inhalation Q4H PRN    atorvastatin (Lipitor) tab 40 mg  40 mg Oral Nightly    fluticasone furoate-vilanterol (Breo Ellipta) 200-25 MCG/ACT inhaler 1 puff  1 puff Inhalation Daily    carvedilol (Coreg) tab 25 mg  25 mg Oral BID    cetirizine (ZyrTEC) tab 5 mg  5 mg Oral QOD    cholecalciferol (Vitamin D3) tab 5,000 Units  5,000 Units Oral BID    escitalopram (Lexapro) tab 5 mg  5 mg Oral Daily    amLODIPine (Norvasc) tab 10 mg  10 mg Oral Daily    fluticasone propionate (Flonase) 50 MCG/ACT nasal suspension 2 spray  2 spray Nasal Daily    hydrALAZINE (Apresoline) tab 25 mg  25 mg Oral Q8H STEPHANIE    methocarbamol (Robaxin) tab 500 mg  500 mg Oral BID PRN    pantoprazole (Protonix) DR tab 40 mg  40 mg Oral QAM AC    traMADol (Ultram) tab 50 mg  50 mg Oral Q12H PRN    tetrahydrozoline (Visine) 0.05 % ophthalmic solution 1 drop  1 drop Both Eyes BID PRN    glucose (Dex4) 15 GM/59ML oral liquid 15 g  15 g Oral Q15 Min PRN    Or    glucose (Glutose) 40% oral gel 15 g  15 g Oral Q15 Min PRN    Or    glucose-vitamin C (Dex-4) chewable tab 4 tablet  4 tablet Oral Q15 Min PRN    Or    dextrose 50% injection 50 mL  50 mL Intravenous Q15 Min PRN    Or    glucose (Dex4) 15 GM/59ML oral liquid 30 g  30 g Oral Q15 Min PRN    Or    glucose (Glutose) 40% oral gel 30 g  30 g Oral Q15 Min PRN    Or    glucose-vitamin C (Dex-4) chewable tab 8 tablet  8 tablet Oral Q15 Min PRN    acetaminophen (Tylenol Extra Strength) tab 500 mg  500 mg Oral Q4H PRN    melatonin tab 3 mg  3 mg Oral Nightly PRN    ondansetron  (Zofran) 4 MG/2ML injection 4 mg  4 mg Intravenous Q6H PRN    metoclopramide (Reglan) 5 mg/mL injection 5 mg  5 mg Intravenous Q8H PRN    insulin aspart (NovoLOG) 100 Units/mL FlexPen 1-5 Units  1-5 Units Subcutaneous TID CC    hydrocortisone (Anusol-HC) 25 MG rectal suppository 25 mg  25 mg Rectal BID    clopidogrel (Plavix) tab 75 mg  75 mg Oral Daily    aspirin DR tab 81 mg  81 mg Oral Daily       Allergies  Allergies   Allergen Reactions    Adhesive Tape OTHER (SEE COMMENTS)     Severe rashes    Dust Mite Extract RASH       Review of Systems:   Constitutional: negative for fatigue, fevers and weight loss  Eyes: negative for irritation, redness and visual disturbance  Ears, nose, mouth, throat, and face: negative for hearing loss and sore throat  Respiratory: negative for cough, hemoptysis and wheezing  Cardiovascular: negative for chest pain, exertional dyspnea, lower extremity edema and palpitations  Gastrointestinal: See above  Hematologic/lymphatic: negative for bleeding and easy bruising  Musculoskeletal:negative for back pain, bone pain and muscle weakness  Neurological: negative for gait problems, memory problems and seizures  Behavioral/Psych: negative for anxiety and depression  Endocrine: negative for polyuria and weight loss     Physical Exam:   Height: 5' 3\" (160 cm) (06/01 0202)  Weight: 157 lb 11.2 oz (71.5 kg) (06/01 0623)  BSA (Calculated - sq m): 1.74 sq meters (06/01 0202)  Pulse: 72 (06/01 1556)  BP: 170/64 (06/01 1556)  Temp: 97.9 °F (36.6 °C) (06/01 0938)  Do Not Use - Resp Rate: --  SpO2: 100 % (06/01 1556)  Temp:  [97.9 °F (36.6 °C)-98.4 °F (36.9 °C)] 97.9 °F (36.6 °C)  Pulse:  [66-74] 72  Resp:  [17-20] 17  BP: (140-170)/(53-64) 170/64  SpO2:  [98 %-100 %] 100 %  SpO2: 100 %     Intake/Output Summary (Last 24 hours) at 6/1/2024 1704  Last data filed at 6/1/2024 0930  Gross per 24 hour   Intake 358 ml   Output --   Net 358 ml     Wt Readings from Last 5 Encounters:   06/01/24 157 lb 11.2 oz  (71.5 kg)   05/28/24 163 lb 9.6 oz (74.2 kg)   05/22/24 152 lb 1.6 oz (69 kg)   04/02/24 171 lb (77.6 kg)   02/29/24 169 lb (76.7 kg)       General appearance: alert, appears stated age and cooperative  Head: Normocephalic, atraumatic  Eyes: conjunctivae/corneas clear  Throat: lips, mucosa, and tongue normal; teeth and gums normal  Neck:  no JVD, supple  Extremities: extremities normal, no edema  Skin: No rashes or lesions  Neurologic: Grossly normal  Psychiatric: calm    Results:     Laboratory Data:  Recent Labs   Lab 06/01/24  0211 06/01/24  0320   RBC 3.39*  --    HGB 10.1* 9.8*   HCT 32.7* 31.5*   MCV 96.5  --    NEPRELIM 3.36  --    WBC 5.7  --    .0  --      Recent Labs   Lab 06/01/24  0211   *   BUN 29*   CREATSERUM 4.17*   CA 9.6   ALB 3.7      K 3.7      CO2 32.0   ALKPHO 64   AST 25   ALT 26   BILT 0.4   TP 6.7     No results found for: \"PTT\", \"INR\"  No results for input(s): \"BNP\" in the last 168 hours.         Impression:       Patient is a 79 year old male with past medical history of coronary artery disease s/p PCI on 5/21, A-fib on anticoagulation, ESRD on hemodialysis Monday Wednesday Friday via AV fistula, hypertension who presented to the emergency room for rectal bleeding and maroon-colored stools    1.ESRD: On HD  HD through AV fistula.  HD at [place] outpatient Monday Wednesday Friday  Next HD on Monday  Check serum phosphorus    2.GI bleed: Blood loss anemia  GI consulted-input noted  EGD and colonoscopy on 6/3  Eliquis on hold    3.coronary artery disease s/p PCI on 5/21  Cardiology consulted.  Aspirin and Plavix restarted while Eliquis on hold    Discussed with nursing.  Cardiology and GI input noted    Thank you for allowing me to participate in the care of your patient.    Walker Sheppard MD  6/1/2024

## 2024-06-02 LAB
ANION GAP SERPL CALC-SCNC: 8 MMOL/L (ref 0–18)
ANTIBODY SCREEN: NEGATIVE
BASOPHILS # BLD AUTO: 0.05 X10(3) UL (ref 0–0.2)
BASOPHILS NFR BLD AUTO: 0.9 %
BUN BLD-MCNC: 38 MG/DL (ref 9–23)
BUN/CREAT SERPL: 7.1 (ref 10–20)
CALCIUM BLD-MCNC: 10.1 MG/DL (ref 8.7–10.4)
CHLORIDE SERPL-SCNC: 100 MMOL/L (ref 98–112)
CO2 SERPL-SCNC: 30 MMOL/L (ref 21–32)
CREAT BLD-MCNC: 5.36 MG/DL
DEPRECATED RDW RBC AUTO: 59.9 FL (ref 35.1–46.3)
EGFRCR SERPLBLD CKD-EPI 2021: 10 ML/MIN/1.73M2 (ref 60–?)
EOSINOPHIL # BLD AUTO: 0.16 X10(3) UL (ref 0–0.7)
EOSINOPHIL NFR BLD AUTO: 2.8 %
ERYTHROCYTE [DISTWIDTH] IN BLOOD BY AUTOMATED COUNT: 17.7 % (ref 11–15)
GLUCOSE BLD-MCNC: 124 MG/DL (ref 70–99)
GLUCOSE BLDC GLUCOMTR-MCNC: 126 MG/DL (ref 70–99)
GLUCOSE BLDC GLUCOMTR-MCNC: 137 MG/DL (ref 70–99)
GLUCOSE BLDC GLUCOMTR-MCNC: 145 MG/DL (ref 70–99)
GLUCOSE BLDC GLUCOMTR-MCNC: 156 MG/DL (ref 70–99)
HCT VFR BLD AUTO: 33.4 %
HGB BLD-MCNC: 10.5 G/DL
IMM GRANULOCYTES # BLD AUTO: 0.02 X10(3) UL (ref 0–1)
IMM GRANULOCYTES NFR BLD: 0.3 %
LYMPHOCYTES # BLD AUTO: 1.13 X10(3) UL (ref 1–4)
LYMPHOCYTES NFR BLD AUTO: 19.6 %
MCH RBC QN AUTO: 29.8 PG (ref 26–34)
MCHC RBC AUTO-ENTMCNC: 31.4 G/DL (ref 31–37)
MCV RBC AUTO: 94.9 FL
MONOCYTES # BLD AUTO: 0.39 X10(3) UL (ref 0.1–1)
MONOCYTES NFR BLD AUTO: 6.7 %
NEUTROPHILS # BLD AUTO: 4.03 X10 (3) UL (ref 1.5–7.7)
NEUTROPHILS # BLD AUTO: 4.03 X10(3) UL (ref 1.5–7.7)
NEUTROPHILS NFR BLD AUTO: 69.7 %
OSMOLALITY SERPL CALC.SUM OF ELEC: 296 MOSM/KG (ref 275–295)
PLATELET # BLD AUTO: 233 10(3)UL (ref 150–450)
POTASSIUM SERPL-SCNC: 3.9 MMOL/L (ref 3.5–5.1)
RBC # BLD AUTO: 3.52 X10(6)UL
RH BLOOD TYPE: POSITIVE
SODIUM SERPL-SCNC: 138 MMOL/L (ref 136–145)
WBC # BLD AUTO: 5.8 X10(3) UL (ref 4–11)

## 2024-06-02 PROCEDURE — 99233 SBSQ HOSP IP/OBS HIGH 50: CPT | Performed by: INTERNAL MEDICINE

## 2024-06-02 PROCEDURE — 99232 SBSQ HOSP IP/OBS MODERATE 35: CPT | Performed by: INTERNAL MEDICINE

## 2024-06-02 RX ORDER — ALBUMIN (HUMAN) 12.5 G/50ML
25 SOLUTION INTRAVENOUS
Status: ACTIVE | OUTPATIENT
Start: 2024-06-02 | End: 2024-06-04

## 2024-06-02 NOTE — PROGRESS NOTES
[facility]  GI Progress Note      Ollie Hernández Patient Status:  Observation    1945 MRN [id number]   Location [facility] 5SW/SE Attending Wili Parmar MD   Hosp Day # 0 PCP Wili Parmar MD          SUBJECTIVE:     He had a small, non-bloody BM today. He denies abdominal pain    OBJECTIVE:    Height: --  Weight: 157 lb 1.6 oz (71.3 kg) (603)  BSA (Calculated - sq m): --  Pulse: 68 (843)  BP: 148/52 (843)  Temp: 98.3 °F (36.8 °C) (843)  Do Not Use - Resp Rate: --  SpO2: 100 % (843)        General: Alert, oriented, no acute distress  Lungs: Clear  Heart: Normal S1 and S2  Abdomen: Soft, non-tender. Bowel sounds are positive.   Extremities: No edema    LAB RESULTS:       Lab Results   Component Value Date    WBC 5.8 2024    HGB 10.5 2024    HCT 33.4 2024    .0 2024    CREATSERUM 5.36 2024    BUN 38 2024     2024    K 3.9 2024     2024    CO2 30.0 2024     2024    CA 10.1 2024    PGLU 156 2024        ASSESSMENT:    1.  GI bleed  2.  History of colon polyps  3.  Hiatal hernia     He has no further bleeding.     The patient presented with rectal bleeding for 2 days.  He is currently on aspirin, Plavix and Eliquis.  This could be affecting his bleeding.  He has a history of colon polyps and a hiatal hernia.  His last colonoscopy was in  and EGD in .     He is hemodynamically stable.  His hemoglobin is unchanged since admission.     The etiology of his GI bleeding is unclear.  Both upper and lower GI etiologies should be considered.  I also am concerned about the possibility of AVMs given his previous history of anemia, cardiac disease, renal failure and his age.    PLAN:    Continue to hold Eliquis.  Follow hemoglobin.  EGD and colonoscopy tomorrow.     Emilio Talley MD

## 2024-06-02 NOTE — PROGRESS NOTES
[facility]  part of [facility]    Progress Note      Subjective:     Sitting comfortably.  No further GI bleed.  Family at bedside.  No chest pain or shortness of breath or abdominal pain      Review of Systems:     Constitutional: negative for fatigue, fevers and weight loss  Eyes: negative for irritation, redness and visual disturbance  Ears, nose, mouth, throat, and face: negative for hearing loss and sore throat  Respiratory: negative for cough, hemoptysis and wheezing  Cardiovascular: negative for chest pain, exertional dyspnea, lower extremity edema  Gastrointestinal: See above  Hematologic/lymphatic: negative for bleeding and easy bruising  Musculoskeletal:negative for back pain, bone pain and muscle weakness  Neurological: negative for gait problems, memory problems and seizures  Behavioral/Psych: negative for anxiety and depression  Endocrine: negative for polyuria and weight loss     Objective:   Temp:  [98.2 °F (36.8 °C)-98.3 °F (36.8 °C)] 98.3 °F (36.8 °C)  Pulse:  [68-73] 68  Resp:  [17-18] 18  BP: (148-178)/(52-76) 148/52  SpO2:  [98 %-100 %] 100 %  SpO2: 100 %     Intake/Output Summary (Last 24 hours) at 6/2/2024 1234  Last data filed at 6/2/2024 0900  Gross per 24 hour   Intake 446 ml   Output --   Net 446 ml     Wt Readings from Last 3 Encounters:   06/02/24 157 lb 1.6 oz (71.3 kg)   05/28/24 163 lb 9.6 oz (74.2 kg)   05/22/24 152 lb 1.6 oz (69 kg)     General appearance: alert, appears stated age and cooperative  Head: Normocephalic, atraumatic  Eyes: conjunctivae/corneas clear  Throat: lips, mucosa, and tongue normal; teeth and gums normal  Neck:  no JVD, supple  Extremities: extremities normal, no edema  Skin: No rashes or lesions  Neurologic: Grossly normal  Psychiatric: calm       Medications:  Current Facility-Administered Medications   Medication Dose Route Frequency    [START ON 6/3/2024] polyethylene glycol-electrolyte (Golytely) 236 g oral solution 2,000 mL  2,000 mL  Oral Once    polyethylene glycol-electrolyte (Golytely) 236 g oral solution 2,000 mL  2,000 mL Oral Once    albuterol (Ventolin HFA) 108 (90 Base) MCG/ACT inhaler 2 puff  2 puff Inhalation Q4H PRN    atorvastatin (Lipitor) tab 40 mg  40 mg Oral Nightly    fluticasone furoate-vilanterol (Breo Ellipta) 200-25 MCG/ACT inhaler 1 puff  1 puff Inhalation Daily    carvedilol (Coreg) tab 25 mg  25 mg Oral BID    cetirizine (ZyrTEC) tab 5 mg  5 mg Oral QOD    cholecalciferol (Vitamin D3) tab 5,000 Units  5,000 Units Oral BID    escitalopram (Lexapro) tab 5 mg  5 mg Oral Daily    amLODIPine (Norvasc) tab 10 mg  10 mg Oral Daily    fluticasone propionate (Flonase) 50 MCG/ACT nasal suspension 2 spray  2 spray Nasal Daily    hydrALAZINE (Apresoline) tab 25 mg  25 mg Oral Q8H STEPHANIE    methocarbamol (Robaxin) tab 500 mg  500 mg Oral BID PRN    pantoprazole (Protonix) DR tab 40 mg  40 mg Oral QAM AC    traMADol (Ultram) tab 50 mg  50 mg Oral Q12H PRN    tetrahydrozoline (Visine) 0.05 % ophthalmic solution 1 drop  1 drop Both Eyes BID PRN    glucose (Dex4) 15 GM/59ML oral liquid 15 g  15 g Oral Q15 Min PRN    Or    glucose (Glutose) 40% oral gel 15 g  15 g Oral Q15 Min PRN    Or    glucose-vitamin C (Dex-4) chewable tab 4 tablet  4 tablet Oral Q15 Min PRN    Or    dextrose 50% injection 50 mL  50 mL Intravenous Q15 Min PRN    Or    glucose (Dex4) 15 GM/59ML oral liquid 30 g  30 g Oral Q15 Min PRN    Or    glucose (Glutose) 40% oral gel 30 g  30 g Oral Q15 Min PRN    Or    glucose-vitamin C (Dex-4) chewable tab 8 tablet  8 tablet Oral Q15 Min PRN    acetaminophen (Tylenol Extra Strength) tab 500 mg  500 mg Oral Q4H PRN    melatonin tab 3 mg  3 mg Oral Nightly PRN    ondansetron (Zofran) 4 MG/2ML injection 4 mg  4 mg Intravenous Q6H PRN    metoclopramide (Reglan) 5 mg/mL injection 5 mg  5 mg Intravenous Q8H PRN    insulin aspart (NovoLOG) 100 Units/mL FlexPen 1-5 Units  1-5 Units Subcutaneous TID CC    hydrocortisone (Anusol-HC) 25 MG  rectal suppository 25 mg  25 mg Rectal BID    clopidogrel (Plavix) tab 75 mg  75 mg Oral Daily    aspirin DR tab 81 mg  81 mg Oral Daily              Results:     Recent Labs   Lab 06/01/24  0211 06/01/24  0320 06/01/24  1653 06/02/24  0557   RBC 3.39*  --   --  3.52*   HGB 10.1* 9.8* 11.6* 10.5*   HCT 32.7* 31.5* 37.6* 33.4*   MCV 96.5  --   --  94.9   NEPRELIM 3.36  --   --  4.03   WBC 5.7  --   --  5.8   .0  --   --  233.0     Recent Labs   Lab 06/01/24  0211 06/02/24  0557   * 124*   BUN 29* 38*   CREATSERUM 4.17* 5.36*   CA 9.6 10.1   ALB 3.7  --     138   K 3.7 3.9    100   CO2 32.0 30.0   ALKPHO 64  --    AST 25  --    ALT 26  --    BILT 0.4  --    TP 6.7  --      No results found for: \"PTT\", \"INR\"  No results for input(s): \"BNP\" in the last 168 hours.  Recent Labs   Lab 06/01/24 0211   PHOS 3.2        Recent Labs   Lab 06/01/24 0211   PHOS 3.2   ALB 3.7       No results found.        Assessment and Plan:      Patient is a 79 year old male with past medical history of coronary artery disease s/p PCI on 5/21, A-fib on anticoagulation, ESRD on hemodialysis Monday Wednesday Friday via AV fistula, hypertension who presented to the emergency room for rectal bleeding and maroon-colored stools     1.ESRD: On HD  HD through AV fistula.  HD at [place] outpatient Monday Wednesday Friday  Next HD on Monday  Serum Phos within normal limits     2.GI bleed: Blood loss anemia  GI consulted-input noted  EGD and colonoscopy on 6/3  Eliquis on hold     3.coronary artery disease s/p PCI on 5/21  Cardiology consulted.  Aspirin and Plavix restarted while Eliquis on hold       Discussed with nursing and family at bedside    Walker Sheppard MD  6/2/2024

## 2024-06-02 NOTE — PLAN OF CARE
No acute changes. NPO midnight for EGD/colonoscopy tomorrow. Dialysis scheduled for tomorrow. Safety precautions in place. Call light is within reach.     Problem: Patient Centered Care  Goal: Patient preferences are identified and integrated in the patient's plan of care  Description: Interventions:  - What would you like us to know as we care for you?   - Provide timely, complete, and accurate information to patient/family  - Incorporate patient and family knowledge, values, beliefs, and cultural backgrounds into the planning and delivery of care  - Encourage patient/family to participate in care and decision-making at the level they choose  - Honor patient and family perspectives and choices  Outcome: Progressing     Problem: PAIN - ADULT  Goal: Verbalizes/displays adequate comfort level or patient's stated pain goal  Description: INTERVENTIONS:  - Encourage pt to monitor pain and request assistance  - Assess pain using appropriate pain scale  - Administer analgesics based on type and severity of pain and evaluate response  - Implement non-pharmacological measures as appropriate and evaluate response  - Consider cultural and social influences on pain and pain management  - Manage/alleviate anxiety  - Utilize distraction and/or relaxation techniques  - Monitor for opioid side effects  - Notify MD/LIP if interventions unsuccessful or patient reports new pain  - Anticipate increased pain with activity and pre-medicate as appropriate  Outcome: Progressing     Problem: RISK FOR INFECTION - ADULT  Goal: Absence of fever/infection during anticipated neutropenic period  Description: INTERVENTIONS  - Monitor WBC  - Administer growth factors as ordered  - Implement neutropenic guidelines  Outcome: Progressing     Problem: SAFETY ADULT - FALL  Goal: Free from fall injury  Description: INTERVENTIONS:  - Assess pt frequently for physical needs  - Identify cognitive and physical deficits and behaviors that affect risk of  falls.  - [place] fall precautions as indicated by assessment.  - Educate pt/family on patient safety including physical limitations  - Instruct pt to call for assistance with activity based on assessment  - Modify environment to reduce risk of injury  - Provide assistive devices as appropriate  - Consider OT/PT consult to assist with strengthening/mobility  - Encourage toileting schedule  Outcome: Progressing     Problem: HEMATOLOGIC - ADULT  Goal: Free from bleeding injury  Description: (Example usage: patient with low platelets)  INTERVENTIONS:  - Avoid intramuscular injections, enemas and rectal medication administration  - Ensure safe mobilization of patient  - Hold pressure on venipuncture sites to achieve adequate hemostasis  - Assess for signs and symptoms of internal bleeding  - Monitor lab trends  - Patient is to report abnormal signs of bleeding to staff  - Avoid use of toothpicks and dental floss  - Use electric shaver for shaving  - Use soft bristle tooth brush  - Limit straining and forceful nose blowing  Outcome: Progressing

## 2024-06-02 NOTE — PROGRESS NOTES
[facility] Cardiology  Consultation Note      Ollie Hernández Patient Status:  Observation    1945 MRN [id number]   Location [facility] 5SW/SE Attending Wili Parmar MD   Hosp Day # 0 PCP Wili Parmar MD     Reason for consult: GIB, recent PCI    Subjective: BM this morning, no blood. No CP or SOB.     Impression:  79 year old male admitted with rectal bleeding    ABLA Hgb 11.5 (24) -> 9.8   CAD - PCI LCX with ABIMBOLA x 1 (24)  PAF on Eliquis  NSVT on Zio patch  Chronic HFpEF - compensated  ESRD on HD MWF  DM2  HTN  KRAIG on CPAP  LVEF 60% (echo )    Recommendations:  Risk of life threatening stent thrombosis outweighs risk of life threatening hemorrhage at present. Restarted plavix and aspirin.  Follow Hgb closely and transfuse for Hgb < 8 given CAD. Plan for scope on Monday.  Can continue holding Eliquis. Consider Watchman in the future.   Currently hypertensive, continue coreg, norvasc and hydralazine cautiously.     Primary cardiologist: Herman Phelan MD     History of Present Illness:  Ollie Hernández is a 79 year old male with CAD and PCI to LCX (24, abnormal stress test, obtained due to recurrent NSVT on Zio patch), PAF on Eliquis, chronic HFpEF, ESRD on HD, was on ASA, plavix, Eliquis x 1 week post PCI then stopped ASA - who presented to [facility] on 2024 with rectal bleeding x 3 days.  Hgb 11.5 -> 9.8. Last blood in stool this morning. Denies CP, SOB.     Medications:  Current Facility-Administered Medications   Medication Dose Route Frequency    [START ON 6/3/2024] polyethylene glycol-electrolyte (Golytely) 236 g oral solution 2,000 mL  2,000 mL Oral Once    polyethylene glycol-electrolyte (Golytely) 236 g oral solution 2,000 mL  2,000 mL Oral Once    albuterol (Ventolin HFA) 108 (90 Base) MCG/ACT inhaler 2 puff  2 puff Inhalation Q4H PRN    atorvastatin (Lipitor) tab 40 mg  40 mg Oral Nightly    fluticasone furoate-vilanterol (Breo Ellipta) 200-25  MCG/ACT inhaler 1 puff  1 puff Inhalation Daily    carvedilol (Coreg) tab 25 mg  25 mg Oral BID    cetirizine (ZyrTEC) tab 5 mg  5 mg Oral QOD    cholecalciferol (Vitamin D3) tab 5,000 Units  5,000 Units Oral BID    escitalopram (Lexapro) tab 5 mg  5 mg Oral Daily    amLODIPine (Norvasc) tab 10 mg  10 mg Oral Daily    fluticasone propionate (Flonase) 50 MCG/ACT nasal suspension 2 spray  2 spray Nasal Daily    hydrALAZINE (Apresoline) tab 25 mg  25 mg Oral Q8H STEPHANIE    methocarbamol (Robaxin) tab 500 mg  500 mg Oral BID PRN    pantoprazole (Protonix) DR tab 40 mg  40 mg Oral QAM AC    traMADol (Ultram) tab 50 mg  50 mg Oral Q12H PRN    tetrahydrozoline (Visine) 0.05 % ophthalmic solution 1 drop  1 drop Both Eyes BID PRN    glucose (Dex4) 15 GM/59ML oral liquid 15 g  15 g Oral Q15 Min PRN    Or    glucose (Glutose) 40% oral gel 15 g  15 g Oral Q15 Min PRN    Or    glucose-vitamin C (Dex-4) chewable tab 4 tablet  4 tablet Oral Q15 Min PRN    Or    dextrose 50% injection 50 mL  50 mL Intravenous Q15 Min PRN    Or    glucose (Dex4) 15 GM/59ML oral liquid 30 g  30 g Oral Q15 Min PRN    Or    glucose (Glutose) 40% oral gel 30 g  30 g Oral Q15 Min PRN    Or    glucose-vitamin C (Dex-4) chewable tab 8 tablet  8 tablet Oral Q15 Min PRN    acetaminophen (Tylenol Extra Strength) tab 500 mg  500 mg Oral Q4H PRN    melatonin tab 3 mg  3 mg Oral Nightly PRN    ondansetron (Zofran) 4 MG/2ML injection 4 mg  4 mg Intravenous Q6H PRN    metoclopramide (Reglan) 5 mg/mL injection 5 mg  5 mg Intravenous Q8H PRN    insulin aspart (NovoLOG) 100 Units/mL FlexPen 1-5 Units  1-5 Units Subcutaneous TID CC    hydrocortisone (Anusol-HC) 25 MG rectal suppository 25 mg  25 mg Rectal BID    clopidogrel (Plavix) tab 75 mg  75 mg Oral Daily    aspirin DR tab 81 mg  81 mg Oral Daily       Past Medical History:    Anemia    Asthma (HCC)    Back problem    BPH (benign prostatic hyperplasia)    Calculus of kidney    Cataract    Diabetes (HCC)    ESRD (end  stage renal disease) on dialysis (HCC)    Essential hypertension    High blood pressure    High cholesterol    History of blood transfusion    Hyperlipidemia    Neuropathy    hands and feet    KRAIG on CPAP    Renal disorder    Sleep apnea    Visual impairment    glasses    Vocal cord paralysis, unilateral partial       Past Surgical History:   Procedure Laterality Date    Appendectomy          Back surgery      Neck/back -     Capsule endoscopy - internal referral      Cataract      2021 and 2022    Colonoscopy      Colonoscopy N/A 2021    Procedure: COLONOSCOPY;  Surgeon: Michael Gautam MD;  Location: [facility] ENDO    Hand/finger surgery unlisted      Accidental trauma    Upper gi endoscopy,diagnosis         Family History  family history includes Breast Cancer in his mother; Cancer in his father; Diabetes in his maternal grandfather, maternal grandmother, and mother; Heart Disorder in an other family member.    Social History   reports that he quit smoking about 43 years ago. His smoking use included cigarettes. He started smoking about 60 years ago. He has a 17 pack-year smoking history. He has never used smokeless tobacco. He reports that he does not drink alcohol and does not use drugs.     Allergies  Allergies   Allergen Reactions    Adhesive Tape OTHER (SEE COMMENTS)     Severe rashes    Dust Mite Extract RASH       Review of Systems:  As per HPI, otherwise 10 point ROS is negative in detail.    Physical Exam:  Blood pressure 148/52, pulse 68, temperature 98.3 °F (36.8 °C), temperature source Oral, resp. rate 18, height 63\", weight 157 lb 1.6 oz (71.3 kg), SpO2 100%.  Temp (24hrs), Av.2 °F (36.8 °C), Min:98.2 °F (36.8 °C), Max:98.3 °F (36.8 °C)    Wt Readings from Last 3 Encounters:   24 157 lb 1.6 oz (71.3 kg)   24 163 lb 9.6 oz (74.2 kg)   24 152 lb 1.6 oz (69 kg)       General: Awake and alert; in no acute distress  HEENT: Extraocular movements are intact; sclerae are  anicteric; scalp is atraumatic  Neck: Supple; no JVD; no carotid bruits  Cardiac: Regular rate and regular rhythm; normal S1 and S2, no murmurs, rubs, or gallops are appreciated  Lungs: Clear to auscultation bilaterally; no accessory muscle use is noted, no wheezes, rhonci or rales  Abdomen: Soft, non-distended, non-tender; bowel sounds are normoactive  Extremities: Warm, no edema, clubbing or cyanosis; moves all 4 extremities normally, distal pulses intact and equal  Psychiatric: Normal mood and affect; answers questions appropriately  Dermatologic: No rashes; normal skin turgor    Diagnostic testing:    Labs:   No results found for: \"PT\", \"INR\"     Lab Results   Component Value Date    WBC 5.8 06/02/2024    HGB 10.5 06/02/2024    HCT 33.4 06/02/2024    .0 06/02/2024    CREATSERUM 5.36 06/02/2024    BUN 38 06/02/2024     06/02/2024    K 3.9 06/02/2024     06/02/2024    CO2 30.0 06/02/2024     06/02/2024    CA 10.1 06/02/2024    PGLU 156 06/02/2024       Cardiac diagnostics:    Cath 5/21/24: LM 20%, LAD 70% apex, LCX 80%, RCA small, s/p ABIMBOLA x 1 LCX (2.75 x 15 mm)    Thank you for allowing our practice to participate in the care of your patient. Please do not hesitate to contact me if you have any questions.    Juan Moore MD  Interventional Cardiology  [facility]  Office: [phone]

## 2024-06-02 NOTE — PLAN OF CARE
Problem: Patient Centered Care  Goal: Patient preferences are identified and integrated in the patient's plan of care  Description: Interventions:  - What would you like us to know as we care for you?   - Provide timely, complete, and accurate information to patient/family  - Incorporate patient and family knowledge, values, beliefs, and cultural backgrounds into the planning and delivery of care  - Encourage patient/family to participate in care and decision-making at the level they choose  - Honor patient and family perspectives and choices  Outcome: Progressing     Problem: PAIN - ADULT  Goal: Verbalizes/displays adequate comfort level or patient's stated pain goal  Description: INTERVENTIONS:  - Encourage pt to monitor pain and request assistance  - Assess pain using appropriate pain scale  - Administer analgesics based on type and severity of pain and evaluate response  - Implement non-pharmacological measures as appropriate and evaluate response  - Consider cultural and social influences on pain and pain management  - Manage/alleviate anxiety  - Utilize distraction and/or relaxation techniques  - Monitor for opioid side effects  - Notify MD/LIP if interventions unsuccessful or patient reports new pain  - Anticipate increased pain with activity and pre-medicate as appropriate  Outcome: Progressing     Problem: RISK FOR INFECTION - ADULT  Goal: Absence of fever/infection during anticipated neutropenic period  Description: INTERVENTIONS  - Monitor WBC  - Administer growth factors as ordered  - Implement neutropenic guidelines  Outcome: Progressing     Problem: SAFETY ADULT - FALL  Goal: Free from fall injury  Description: INTERVENTIONS:  - Assess pt frequently for physical needs  - Identify cognitive and physical deficits and behaviors that affect risk of falls.  - [place] fall precautions as indicated by assessment.  - Educate pt/family on patient safety including physical limitations  - Instruct pt to call  for assistance with activity based on assessment  - Modify environment to reduce risk of injury  - Provide assistive devices as appropriate  - Consider OT/PT consult to assist with strengthening/mobility  - Encourage toileting schedule  Outcome: Progressing     Problem: HEMATOLOGIC - ADULT  Goal: Free from bleeding injury  Description: (Example usage: patient with low platelets)  INTERVENTIONS:  - Avoid intramuscular injections, enemas and rectal medication administration  - Ensure safe mobilization of patient  - Hold pressure on venipuncture sites to achieve adequate hemostasis  - Assess for signs and symptoms of internal bleeding  - Monitor lab trends  - Patient is to report abnormal signs of bleeding to staff  - Avoid use of toothpicks and dental floss  - Use electric shaver for shaving  - Use soft bristle tooth brush  - Limit straining and forceful nose blowing  Outcome: Progressing

## 2024-06-02 NOTE — PLAN OF CARE
Problem: Patient Centered Care  Goal: Patient preferences are identified and integrated in the patient's plan of care  Description: Interventions:  - What would you like us to know as we care for you? From home with wife  - Provide timely, complete, and accurate information to patient/family  - Incorporate patient and family knowledge, values, beliefs, and cultural backgrounds into the planning and delivery of care  - Encourage patient/family to participate in care and decision-making at the level they choose  - Honor patient and family perspectives and choices  Outcome: Progressing     Problem: PAIN - ADULT  Goal: Verbalizes/displays adequate comfort level or patient's stated pain goal  Description: INTERVENTIONS:  - Encourage pt to monitor pain and request assistance  - Assess pain using appropriate pain scale  - Administer analgesics based on type and severity of pain and evaluate response  - Implement non-pharmacological measures as appropriate and evaluate response  - Consider cultural and social influences on pain and pain management  - Manage/alleviate anxiety  - Utilize distraction and/or relaxation techniques  - Monitor for opioid side effects  - Notify MD/LIP if interventions unsuccessful or patient reports new pain  - Anticipate increased pain with activity and pre-medicate as appropriate  Outcome: Progressing     Problem: RISK FOR INFECTION - ADULT  Goal: Absence of fever/infection during anticipated neutropenic period  Description: INTERVENTIONS  - Monitor WBC  - Administer growth factors as ordered  - Implement neutropenic guidelines  Outcome: Progressing     Problem: SAFETY ADULT - FALL  Goal: Free from fall injury  Description: INTERVENTIONS:  - Assess pt frequently for physical needs  - Identify cognitive and physical deficits and behaviors that affect risk of falls.  - [place] fall precautions as indicated by assessment.  - Educate pt/family on patient safety including physical limitations  -  Instruct pt to call for assistance with activity based on assessment  - Modify environment to reduce risk of injury  - Provide assistive devices as appropriate  - Consider OT/PT consult to assist with strengthening/mobility  - Encourage toileting schedule  Outcome: Progressing     Problem: DISCHARGE PLANNING  Goal: Discharge to home or other facility with appropriate resources  Description: INTERVENTIONS:  - Identify barriers to discharge w/pt and caregiver  - Include patient/family/discharge partner in discharge planning  - Arrange for needed discharge resources and transportation as appropriate  - Identify discharge learning needs (meds, wound care, etc)  - Arrange for interpreters to assist at discharge as needed  - Consider post-discharge preferences of patient/family/discharge partner  - Complete POLST form as appropriate  - Assess patient's ability to be responsible for managing their own health  - Refer to Case Management Department for coordinating discharge planning if the patient needs post-hospital services based on physician/LIP order or complex needs related to functional status, cognitive ability or social support system  Outcome: Progressing     Problem: HEMATOLOGIC - ADULT  Goal: Free from bleeding injury  Description: (Example usage: patient with low platelets)  INTERVENTIONS:  - Avoid intramuscular injections, enemas and rectal medication administration  - Ensure safe mobilization of patient  - Hold pressure on venipuncture sites to achieve adequate hemostasis  - Assess for signs and symptoms of internal bleeding  - Monitor lab trends  - Patient is to report abnormal signs of bleeding to staff  - Avoid use of toothpicks and dental floss  - Use electric shaver for shaving  - Use soft bristle tooth brush  - Limit straining and forceful nose blowing  Outcome: Progressing   No acute changes, patient in no acute distress, denies pain, no shortness of breath. Safety precautions in place

## 2024-06-02 NOTE — PROGRESS NOTES
[facility]  part of [facility]    Progress Note    Ollie Hernández Patient Status:  Observation    1945 MRN [id number]   Location [facility] 5SW/SE Attending Wili Parmar MD   Hosp Day # 0 PCP Wili Parmar MD       SUBJECTIVE:  Feels okay today; had a small BM this am w/o blood    OBJECTIVE:  Vital signs in last 24 hours:  /52 (BP Location: Left arm)   Pulse 68   Temp 98.3 °F (36.8 °C) (Oral)   Resp 18   Ht 5' 3\" (1.6 m)   Wt 157 lb 1.6 oz (71.3 kg)   SpO2 100%   BMI 27.83 kg/m²     Intake/Output:    Intake/Output Summary (Last 24 hours) at 2024 1011  Last data filed at 2024  Gross per 24 hour   Intake 210 ml   Output --   Net 210 ml       Wt Readings from Last 3 Encounters:   24 157 lb 1.6 oz (71.3 kg)   24 163 lb 9.6 oz (74.2 kg)   24 152 lb 1.6 oz (69 kg)       EXAM:  GENERAL: well developed, well nourished, in no apparent distress  LUNGS: clear to auscultation  CARDIO: RRR, normal S1S2, without murmur or gallop  GI: soft, NT, ND, NABS  EXTREMITIES: no cyanosis, clubbing or edema    Data Review:     Labs:   Lab Results   Component Value Date    WBC 5.8 2024    HGB 10.5 2024    HCT 33.4 2024    .0 2024    CREATSERUM 5.36 2024    BUN 38 2024     2024    K 3.9 2024     2024    CO2 30.0 2024     2024    CA 10.1 2024         Imaging:  No results found.            Meds:   Current Facility-Administered Medications   Medication Dose Route Frequency    albuterol (Ventolin HFA) 108 (90 Base) MCG/ACT inhaler 2 puff  2 puff Inhalation Q4H PRN    atorvastatin (Lipitor) tab 40 mg  40 mg Oral Nightly    fluticasone furoate-vilanterol (Breo Ellipta) 200-25 MCG/ACT inhaler 1 puff  1 puff Inhalation Daily    carvedilol (Coreg) tab 25 mg  25 mg Oral BID    cetirizine (ZyrTEC) tab 5 mg  5 mg Oral QOD    cholecalciferol (Vitamin D3) tab 5,000 Units   5,000 Units Oral BID    escitalopram (Lexapro) tab 5 mg  5 mg Oral Daily    amLODIPine (Norvasc) tab 10 mg  10 mg Oral Daily    fluticasone propionate (Flonase) 50 MCG/ACT nasal suspension 2 spray  2 spray Nasal Daily    hydrALAZINE (Apresoline) tab 25 mg  25 mg Oral Q8H STEPHANIE    methocarbamol (Robaxin) tab 500 mg  500 mg Oral BID PRN    pantoprazole (Protonix) DR tab 40 mg  40 mg Oral QAM AC    traMADol (Ultram) tab 50 mg  50 mg Oral Q12H PRN    tetrahydrozoline (Visine) 0.05 % ophthalmic solution 1 drop  1 drop Both Eyes BID PRN    glucose (Dex4) 15 GM/59ML oral liquid 15 g  15 g Oral Q15 Min PRN    Or    glucose (Glutose) 40% oral gel 15 g  15 g Oral Q15 Min PRN    Or    glucose-vitamin C (Dex-4) chewable tab 4 tablet  4 tablet Oral Q15 Min PRN    Or    dextrose 50% injection 50 mL  50 mL Intravenous Q15 Min PRN    Or    glucose (Dex4) 15 GM/59ML oral liquid 30 g  30 g Oral Q15 Min PRN    Or    glucose (Glutose) 40% oral gel 30 g  30 g Oral Q15 Min PRN    Or    glucose-vitamin C (Dex-4) chewable tab 8 tablet  8 tablet Oral Q15 Min PRN    acetaminophen (Tylenol Extra Strength) tab 500 mg  500 mg Oral Q4H PRN    melatonin tab 3 mg  3 mg Oral Nightly PRN    ondansetron (Zofran) 4 MG/2ML injection 4 mg  4 mg Intravenous Q6H PRN    metoclopramide (Reglan) 5 mg/mL injection 5 mg  5 mg Intravenous Q8H PRN    insulin aspart (NovoLOG) 100 Units/mL FlexPen 1-5 Units  1-5 Units Subcutaneous TID CC    hydrocortisone (Anusol-HC) 25 MG rectal suppository 25 mg  25 mg Rectal BID    clopidogrel (Plavix) tab 75 mg  75 mg Oral Daily    aspirin DR tab 81 mg  81 mg Oral Daily       Assessment & Plan    Rectal bleeding   Acute on chronic anemia  -pt with hx of constipation/hemorrhoids/rectal bleeding in the past (saw GI Dr. Gautam for this in 10/2020)  -last colonoscopy 1/25/21 (Dr. Gautam) -- internal hemorrhoids, colon polyps x 2 (one tubular adenoma)  -eliquis on hold  -plavix continued given recent stent  -suspect internal  hemorrhoidal bleed; no external hemorrhoids on exam  -empiric anusol HC suppository BID  -pt had a small BM this am without blood  -Hgb stable  -GI consult appreciated; plan for EGD and colonoscopy in am 6/3     CAD  -[facility] 5/21/2024 by Dr. Luis Orozco: 70% lesion of apical LAD, 80% lesion of LCx  -S/p PCI of LCx (medical management of the LAD lesion)  -s/p aspirin, plavix and eliquis x 1 week, now just plavix and eliquis  -cardiology consult appreciated; plavix restarted     Pharyngeal dysphagia  --right side hypomobile oropharyngeal dysphagia  - swallow study 4/2024: intermittent shallow and deep laryngeal penetration without aspiration, small zenker's diverticulum, bulky endplate osteophytes contributing to dysphagia  - to see Dr. Del Angel (ENT at [place]) for further imaging     Paroxysmal A-fib  --dx'ed 9/2023  --Zio monitor 10/2023 -- NSVT (normal EF 65%); on carvedilol  - followed by EP Dr. Phelan  --eliquis on hold for rectal bleeding as above     Cervical radiculopathy  Chronic back pain with neuropathy  -CT scan of the cervical neck 7/2019 ; history of cervical decompressive laminectomy with multilevel degenerative disc disease  - s/p physical therapy in the past  --MRI brain and MRI cervical spine done 12/29/22 (MRI brain w/mod chronic microvascular ischemic changes bilat cerebral hemispheres, basal ganglia and thalami; MRI cervical spine w/multilevel spinal stenosis)  --saw NS PA Dr. Araujo in 1/2023; had subsequent MRI thoracic/lumbar spine.  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  Pt was referred for PT in 2/2023.      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 Sevilla  --HgbA1c 5.5 in 9/2023  - Gabapentin for neuropathy  --at home: on Tradjenta 5mg daily (no longer on insulin)  -- SSI in  hospital  --'s-120's     ESRD on hemodialysis  - 2/2 prolonged history of diabetes and hypertension  - Follows with Dr. Martinez     Hypertension  - carvedilol 25 mg BID, hydralazine 25mg q8h, felodipine 10mg/day (on amlodipine in hosp due to formulary)     Hyperlipidemia  - Continue with On atorvastatin 40 mg daily, aspirin     BPH  -no longer takes trospium or tamsulosin     Pulmonary hypertension  -Comanagement of KRAIG and chronic diastolic heart failure  - stable     KRAIG on CPAP  - sees pulm Dr. Landeros     Cataracts  -Seems to be stable, follows with Dr. Chase of ophthalmology     Gout  [place] in 6/2022. NO recurrence  -Seems to be stable     Anemia of ESRD  -Baseline hemoglobin seems to be around 10-11  -managed by nephrology; on aranesp  -Hgb decreased as above     Sensorineural hearing loss  Mild-moderate per audiology testing/14/2022.  He may be a hearing aid candidate in the future     Unsteady Gait  Ongoing fatigue due to multiple comorbidities as above  - uses a walker, 2 wheeled-2 constipation.  Does not want a rollator walker at this time  - He is a fall risk with his unsteady gait, fatigue.     Anxiety  Lexapro 5 mg once a day.           D/w pt and wife at more          Jeanne Grider MD  6/2/2024  10:11 AM

## 2024-06-02 NOTE — OCCUPATIONAL THERAPY NOTE
OCCUPATIONAL THERAPY EVALUATION - INPATIENT     Room Number: 549/549-A  Evaluation Date: 6/2/2024  Type of Evaluation: Initial  Presenting Problem: GI bleed    Physician Order: IP Consult to Occupational Therapy  Reason for Therapy: ADL/IADL Dysfunction and Discharge Planning    OCCUPATIONAL THERAPY ASSESSMENT   Patient is a 79 year old male admitted 6/1/2024 for GI bleed.  Prior to admission, patient's baseline is MOD I with ADLs and IADLs, driving.  Patient is currently functioning near/below baseline with lower body dressing, grooming, bed mobility, transfers, dynamic sitting balance, dynamic standing balance, maintaining seated position, functional standing tolerance, and energy conservation strategies.  Patient is requiring stand-by assist as a result of the following impairments: decreased functional strength, decreased functional reach, decreased endurance, pain, impaired dynamic balance, and decreased muscular endurance. Occupational Therapy will continue to follow for duration of hospitalization.    Patient will benefit from continued skilled OT Services For duration of hospitalization, however, given the patient is functioning near baseline level do not anticipate skilled therapy needs at discharge     PLAN  OT Treatment Plan: Balance activities;Energy conservation/work simplification techniques;ADL training;Functional transfer training;Patient/Family education;Patient/Family training     OCCUPATIONAL THERAPY MEDICAL/SOCIAL HISTORY   Problem List  Principal Problem:    Lower GI bleed  Active Problems:    ESRD (end stage renal disease) on dialysis (HCC)    HOME SITUATION  Type of Home: House  Home Layout: One level (5 steps to enter)  Lives With: Spouse  Toilet and Equipment: Toilet riser  Shower/Tub and Equipment: Walk-in shower; Shower chair; Hand-held showerhead; Grab bar  Other Equipment: Other (Comment) (KHADRA PRADO)  Occupation/Status: retired  Hand Dominance: Right  Drives: Yes  Patient Regularly Uses:  None    Stairs in Home: 5  Use of Assistive Device(s): cane, RW    Prior Level of [place]: Pt is MOD I with Adls, IADLs, driving    SUBJECTIVE  \"I get tired so quickly\"    OCCUPATIONAL THERAPY EXAMINATION    OBJECTIVE  Fall Risk: Standard fall risk    PAIN ASSESSMENT  Rating: 3  Location: L hand  Management Techniques: Nurse notified; Body mechanics; Activity promotion    ACTIVITY TOLERANCE                         O2 SATURATIONS       COGNITION  Overall Cognitive Status:  WFL - within functional limits    VISION  Current Vision: no visual deficits    PERCEPTION  Overall Perception Status:   WFL - within functional limits    SENSATION  Light touch:  intact    Communication: WFL    Behavioral/Emotional/Social: WFL    RANGE OF MOTION   Upper extremity ROM is within functional limits     STRENGTH ASSESSMENT  Upper extremity strength is within functional limits     COORDINATION  Gross Motor: WFL   Fine Motor: WFL     ACTIVITIES OF DAILY LIVING ASSESSMENT  AM-PAC ‘6-Clicks’ Inpatient Daily Activity Short Form  How much help from another person does the patient currently need…  -   Putting on and taking off regular lower body clothing?: A Little  -   Bathing (including washing, rinsing, drying)?: A Little  -   Toileting, which includes using toilet, bedpan or urinal? : A Little  -   Putting on and taking off regular upper body clothing?: None  -   Taking care of personal grooming such as brushing teeth?: None  -   Eating meals?: None    AM-PAC Score:  Score: 21  Approx Degree of Impairment: 32.79%  Standardized Score (AM-PAC Scale): 44.27  CMS Modifier (G-Code): CJ    FUNCTIONAL TRANSFER ASSESSMENT  Sit to Stand: Chair  Chair: Stand-by Assist  Toilet Transfer: Stand-by Assist    BED MOBILITY     BALANCE ASSESSMENT  Static Standing: Stand-by Assist  Standing Bilateral: Stand-by Assist    FUNCTIONAL ADL ASSESSMENT  Eating: Supervision  Grooming Standing: Stand-by Assist  Bathing Seated: Stand-by Assist  UB Dressing  Seated: Stand-by Assist  LB Dressing Seated: Stand-by Assist  Toileting Seated: Stand-by Assist    THERAPEUTIC EXERCISE     Skilled Therapy Provided: Pt seen for skilled OT evaluation to address pt's I and safety with functional mobility, t/fs, and ADLs. Pt performing in room functional mobility, grooming at sink 8 minutes, and LBD with SBA 2/2 reports of decreased dynamic balance, activity tolerance, and pain. Pt edu provided re: AE/DME, energy conservation principles, and fall prevention. Pt would benefit from further edu to address I and safety during ADLs and functional mobility.     EDUCATION PROVIDED  Patient: Role of Occupational Therapy; Plan of Care; Discharge Recommendations; DME Recommendations; Functional Transfer Techniques; Fall Prevention; Energy Conservation; Compensatory ADL Techniques; Proper Body Mechanics  Patient's Response to Education: Verbalized Understanding; Returned Demonstration    The patient's Approx Degree of Impairment: 32.79% has been calculated based on documentation in the [facility] '6 clicks' Inpatient Daily Activity Short Form.  Research supports that patients with this level of impairment may benefit from home.  Final disposition will be made by interdisciplinary medical team.     Patient End of Session: Up in chair;Needs met;Call light within reach;Family present;RN aware of session/findings    OT Goals  Patients self stated goal is: get stronger     Patient will complete functional transfer with MOD I  Comment:     Patient will complete toileting with MOD I  Comment:     Patient will tolerate standing for 5 minutes in prep for adls with MOD I    Comment:    Patient will complete item retrieval with MOD I   Comment:          Goals  on: 24  Frequency: 3-5x/week    Patient Evaluation Complexity Level:   Occupational Profile/Medical History MODERATE - Expanded review of history including review of medical or therapy record   Specific performance deficits impacting  engagement in ADL/IADL MODERATE  3 - 5 performance deficits   Client Assessment/Performance Deficits MODERATE - Comorbidities and min to mod modifications of tasks    Clinical Decision Making MODERATE - Analysis of occupational profile, detailed assessments, several treatment options    Overall Complexity MODERATE     OT Session Time: 15 minutes  Self-Care Home Management: 15 minutes    Stephanie Ramírez MS, OTR/L

## 2024-06-03 ENCOUNTER — ANESTHESIA EVENT (OUTPATIENT)
Dept: ENDOSCOPY | Facility: HOSPITAL | Age: 79
End: 2024-06-03
Payer: MEDICARE

## 2024-06-03 ENCOUNTER — ANESTHESIA (OUTPATIENT)
Dept: ENDOSCOPY | Facility: HOSPITAL | Age: 79
End: 2024-06-03
Payer: MEDICARE

## 2024-06-03 LAB
ALBUMIN SERPL-MCNC: 3.7 G/DL (ref 3.2–4.8)
ANION GAP SERPL CALC-SCNC: 9 MMOL/L (ref 0–18)
BASOPHILS # BLD AUTO: 0.06 X10(3) UL (ref 0–0.2)
BASOPHILS NFR BLD AUTO: 1.1 %
BUN BLD-MCNC: 40 MG/DL (ref 9–23)
BUN/CREAT SERPL: 6.8 (ref 10–20)
CALCIUM BLD-MCNC: 9.5 MG/DL (ref 8.7–10.4)
CHLORIDE SERPL-SCNC: 98 MMOL/L (ref 98–112)
CO2 SERPL-SCNC: 27 MMOL/L (ref 21–32)
CREAT BLD-MCNC: 5.86 MG/DL
DEPRECATED RDW RBC AUTO: 60.4 FL (ref 35.1–46.3)
EGFRCR SERPLBLD CKD-EPI 2021: 9 ML/MIN/1.73M2 (ref 60–?)
EOSINOPHIL # BLD AUTO: 0.16 X10(3) UL (ref 0–0.7)
EOSINOPHIL NFR BLD AUTO: 3 %
ERYTHROCYTE [DISTWIDTH] IN BLOOD BY AUTOMATED COUNT: 17.9 % (ref 11–15)
GLUCOSE BLD-MCNC: 124 MG/DL (ref 70–99)
GLUCOSE BLDC GLUCOMTR-MCNC: 138 MG/DL (ref 70–99)
GLUCOSE BLDC GLUCOMTR-MCNC: 144 MG/DL (ref 70–99)
GLUCOSE BLDC GLUCOMTR-MCNC: 148 MG/DL (ref 70–99)
GLUCOSE BLDC GLUCOMTR-MCNC: 149 MG/DL (ref 70–99)
HCT VFR BLD AUTO: 32.1 %
HGB BLD-MCNC: 10.5 G/DL
IMM GRANULOCYTES # BLD AUTO: 0.01 X10(3) UL (ref 0–1)
IMM GRANULOCYTES NFR BLD: 0.2 %
LYMPHOCYTES # BLD AUTO: 1.27 X10(3) UL (ref 1–4)
LYMPHOCYTES NFR BLD AUTO: 23.8 %
MCH RBC QN AUTO: 31 PG (ref 26–34)
MCHC RBC AUTO-ENTMCNC: 32.7 G/DL (ref 31–37)
MCV RBC AUTO: 94.7 FL
MONOCYTES # BLD AUTO: 0.43 X10(3) UL (ref 0.1–1)
MONOCYTES NFR BLD AUTO: 8.1 %
NEUTROPHILS # BLD AUTO: 3.4 X10 (3) UL (ref 1.5–7.7)
NEUTROPHILS # BLD AUTO: 3.4 X10(3) UL (ref 1.5–7.7)
NEUTROPHILS NFR BLD AUTO: 63.8 %
OSMOLALITY SERPL CALC.SUM OF ELEC: 289 MOSM/KG (ref 275–295)
PHOSPHATE SERPL-MCNC: 5 MG/DL (ref 2.4–5.1)
PLATELET # BLD AUTO: 208 10(3)UL (ref 150–450)
POTASSIUM SERPL-SCNC: 3.8 MMOL/L (ref 3.5–5.1)
RBC # BLD AUTO: 3.39 X10(6)UL
SODIUM SERPL-SCNC: 134 MMOL/L (ref 136–145)
WBC # BLD AUTO: 5.3 X10(3) UL (ref 4–11)

## 2024-06-03 PROCEDURE — 99233 SBSQ HOSP IP/OBS HIGH 50: CPT | Performed by: INTERNAL MEDICINE

## 2024-06-03 PROCEDURE — 0W3P8ZZ CONTROL BLEEDING IN GASTROINTESTINAL TRACT, VIA NATURAL OR ARTIFICIAL OPENING ENDOSCOPIC: ICD-10-PCS | Performed by: INTERNAL MEDICINE

## 2024-06-03 PROCEDURE — 5A1D70Z PERFORMANCE OF URINARY FILTRATION, INTERMITTENT, LESS THAN 6 HOURS PER DAY: ICD-10-PCS | Performed by: INTERNAL MEDICINE

## 2024-06-03 PROCEDURE — 0DBK8ZZ EXCISION OF ASCENDING COLON, VIA NATURAL OR ARTIFICIAL OPENING ENDOSCOPIC: ICD-10-PCS | Performed by: INTERNAL MEDICINE

## 2024-06-03 PROCEDURE — 0DJ08ZZ INSPECTION OF UPPER INTESTINAL TRACT, VIA NATURAL OR ARTIFICIAL OPENING ENDOSCOPIC: ICD-10-PCS | Performed by: INTERNAL MEDICINE

## 2024-06-03 DEVICE — REPLAY HEMOSTASIS CLIP, 11MM SPAN
Type: IMPLANTABLE DEVICE | Site: COLON | Status: FUNCTIONAL
Brand: REPLAY

## 2024-06-03 RX ORDER — SODIUM CHLORIDE, SODIUM LACTATE, POTASSIUM CHLORIDE, CALCIUM CHLORIDE 600; 310; 30; 20 MG/100ML; MG/100ML; MG/100ML; MG/100ML
INJECTION, SOLUTION INTRAVENOUS CONTINUOUS PRN
Status: DISCONTINUED | OUTPATIENT
Start: 2024-06-03 | End: 2024-06-03 | Stop reason: SURG

## 2024-06-03 RX ADMIN — SODIUM CHLORIDE, SODIUM LACTATE, POTASSIUM CHLORIDE, CALCIUM CHLORIDE: 600; 310; 30; 20 INJECTION, SOLUTION INTRAVENOUS at 12:15:00

## 2024-06-03 NOTE — PLAN OF CARE
Problem: Patient Centered Care  Goal: Patient preferences are identified and integrated in the patient's plan of care  Description: Interventions:  - What would you like us to know as we care for you? From home with spouse  - Provide timely, complete, and accurate information to patient/family  - Incorporate patient and family knowledge, values, beliefs, and cultural backgrounds into the planning and delivery of care  - Encourage patient/family to participate in care and decision-making at the level they choose  - Honor patient and family perspectives and choices  Outcome: Progressing     Problem: PAIN - ADULT  Goal: Verbalizes/displays adequate comfort level or patient's stated pain goal  Description: INTERVENTIONS:  - Encourage pt to monitor pain and request assistance  - Assess pain using appropriate pain scale  - Administer analgesics based on type and severity of pain and evaluate response  - Implement non-pharmacological measures as appropriate and evaluate response  - Consider cultural and social influences on pain and pain management  - Manage/alleviate anxiety  - Utilize distraction and/or relaxation techniques  - Monitor for opioid side effects  - Notify MD/LIP if interventions unsuccessful or patient reports new pain  - Anticipate increased pain with activity and pre-medicate as appropriate  Outcome: Progressing     Problem: RISK FOR INFECTION - ADULT  Goal: Absence of fever/infection during anticipated neutropenic period  Description: INTERVENTIONS  - Monitor WBC  - Administer growth factors as ordered  - Implement neutropenic guidelines  Outcome: Progressing     Problem: SAFETY ADULT - FALL  Goal: Free from fall injury  Description: INTERVENTIONS:  - Assess pt frequently for physical needs  - Identify cognitive and physical deficits and behaviors that affect risk of falls.  - [place] fall precautions as indicated by assessment.  - Educate pt/family on patient safety including physical limitations  -  Instruct pt to call for assistance with activity based on assessment  - Modify environment to reduce risk of injury  - Provide assistive devices as appropriate  - Consider OT/PT consult to assist with strengthening/mobility  - Encourage toileting schedule  Outcome: Progressing     Problem: DISCHARGE PLANNING  Goal: Discharge to home or other facility with appropriate resources  Description: INTERVENTIONS:  - Identify barriers to discharge w/pt and caregiver  - Include patient/family/discharge partner in discharge planning  - Arrange for needed discharge resources and transportation as appropriate  - Identify discharge learning needs (meds, wound care, etc)  - Arrange for interpreters to assist at discharge as needed  - Consider post-discharge preferences of patient/family/discharge partner  - Complete POLST form as appropriate  - Assess patient's ability to be responsible for managing their own health  - Refer to Case Management Department for coordinating discharge planning if the patient needs post-hospital services based on physician/LIP order or complex needs related to functional status, cognitive ability or social support system  Outcome: Progressing     Problem: HEMATOLOGIC - ADULT  Goal: Free from bleeding injury  Description: (Example usage: patient with low platelets)  INTERVENTIONS:  - Avoid intramuscular injections, enemas and rectal medication administration  - Ensure safe mobilization of patient  - Hold pressure on venipuncture sites to achieve adequate hemostasis  - Assess for signs and symptoms of internal bleeding  - Monitor lab trends  - Patient is to report abnormal signs of bleeding to staff  - Avoid use of toothpicks and dental floss  - Use electric shaver for shaving  - Use soft bristle tooth brush  - Limit straining and forceful nose blowing  Outcome: Progressing     Problem: METABOLIC/FLUID AND ELECTROLYTES - ADULT  Goal: Glucose maintained within prescribed range  Description:  INTERVENTIONS:  - Monitor Blood Glucose as ordered  - Assess for signs and symptoms of hyperglycemia and hypoglycemia  - Administer ordered medications to maintain glucose within target range  - Assess barriers to adequate nutritional intake and initiate nutrition consult as needed  - Instruct patient on self management of diabetes  Outcome: Progressing     Problem: METABOLIC/FLUID AND ELECTROLYTES - ADULT  Goal: Electrolytes maintained within normal limits  Description: INTERVENTIONS:  - Monitor labs and rhythm and assess patient for signs and symptoms of electrolyte imbalances  - Administer electrolyte replacement as ordered  - Monitor response to electrolyte replacements, including rhythm and repeat lab results as appropriate  - Fluid restriction as ordered  - Instruct patient on fluid and nutrition restrictions as appropriate  Outcome: Progressing     Problem: METABOLIC/FLUID AND ELECTROLYTES - ADULT  Goal: Hemodynamic stability and optimal renal function maintained  Description: INTERVENTIONS:  - Monitor labs and assess for signs and symptoms of volume excess or deficit  - Monitor intake, output and patient weight  - Monitor urine specific gravity, serum osmolarity and serum sodium as indicated or ordered  - Monitor response to interventions for patient's volume status, including labs, urine output, blood pressure (other measures as available)  - Encourage oral intake as appropriate  - Instruct patient on fluid and nutrition restrictions as appropriate  Outcome: Progressing   No acute changes, patient in no acute distress, currently tolerating bowel prep well. Plan of care reviewed, safety precautions in place. Fluid intake/output management education provided.

## 2024-06-03 NOTE — ANESTHESIA POSTPROCEDURE EVALUATION
Patient: Ollie Hernández    Procedure Summary       Date: 06/03/24 Room / Location: [facility] ENDOSCOPY 05 / EM ENDOSCOPY    Anesthesia Start: 1214 Anesthesia Stop: 1251    Procedures:       ESOPHAGOGASTRODUODENOSCOPY (EGD)      COLONOSCOPY Diagnosis: (minimal gastritis, small hiatal hernia, hepatic flexure arteriovenous malformations, colon polyp, small hemorrhoids)    Surgeons: Gabriel Saldana MD Anesthesiologist: Ariana Stephens MD    Anesthesia Type: MAC ASA Status: 3            Anesthesia Type: MAC    Vitals Value Taken Time   /55 06/03/24 1251   Temp 97.5 06/03/24 1251   Pulse 100 06/03/24 1251   Resp 14 06/03/24 1251   SpO2 100 06/03/24 1251       [facility] AN Post Evaluation:   Patient Evaluated in PACU  Patient Participation: complete - patient participated  Level of Consciousness: awake and alert  Pain Score: 0  Pain Management: adequate  Airway Patency:patent  Yes    Nausea/Vomiting: none  Cardiovascular Status: acceptable  Respiratory Status: acceptable  Postoperative Hydration acceptable      Ariana Stephens MD  6/3/2024 12:51 PM

## 2024-06-03 NOTE — PLAN OF CARE
Problem: Patient Centered Care  Goal: Patient preferences are identified and integrated in the patient's plan of care  Description: Interventions:  - What would you like us to know as we care for you? Home with wife.  Problem: PAIN - ADULT  Goal: Verbalizes/displays adequate comfort level or patient's stated pain goal  Description: INTERVENTIONS:  - Encourage pt to monitor pain and request assistance  - Assess pain using appropriate pain scale  - Administer analgesics based on type and severity of pain and evaluate response  - Implement non-pharmacological measures as appropriate and evaluate response  - Consider cultural and social influences on pain and pain management  - Manage/alleviate anxiety  - Utilize distraction and/or relaxation techniques  - Monitor for opioid side effects  - Notify MD/LIP if interventions unsuccessful or patient reports new pain  - Anticipate increased pain with activity and pre-medicate as appropriate  Outcome: Progressing     Problem: RISK FOR INFECTION - ADULT  Goal: Absence of fever/infection during anticipated neutropenic period  Description: INTERVENTIONS  - Monitor WBC  - Administer growth factors as ordered  - Implement neutropenic guidelines  Outcome: Progressing     Problem: SAFETY ADULT - FALL  Goal: Free from fall injury  Description: INTERVENTIONS:  - Assess pt frequently for physical needs  - Identify cognitive and physical deficits and behaviors that affect risk of falls.  - [place] fall precautions as indicated by assessment.  - Educate pt/family on patient safety including physical limitations  - Instruct pt to call for assistance with activity based on assessment  - Modify environment to reduce risk of injury  - Provide assistive devices as appropriate  - Consider OT/PT consult to assist with strengthening/mobility  - Encourage toileting schedule  Outcome: Progressing     Problem: DISCHARGE PLANNING  Goal: Discharge to home or other facility with appropriate  resources  Description: INTERVENTIONS:  - Identify barriers to discharge w/pt and caregiver  - Include patient/family/discharge partner in discharge planning  - Arrange for needed discharge resources and transportation as appropriate  - Identify discharge learning needs (meds, wound care, etc)  - Arrange for interpreters to assist at discharge as needed  - Consider post-discharge preferences of patient/family/discharge partner  - Complete POLST form as appropriate  - Assess patient's ability to be responsible for managing their own health  - Refer to Case Management Department for coordinating discharge planning if the patient needs post-hospital services based on physician/LIP order or complex needs related to functional status, cognitive ability or social support system  Outcome: Progressing     Problem: HEMATOLOGIC - ADULT  Goal: Free from bleeding injury  Description: (Example usage: patient with low platelets)  INTERVENTIONS:  - Avoid intramuscular injections, enemas and rectal medication administration  - Ensure safe mobilization of patient  - Hold pressure on venipuncture sites to achieve adequate hemostasis  - Assess for signs and symptoms of internal bleeding  - Monitor lab trends  - Patient is to report abnormal signs of bleeding to staff  - Avoid use of toothpicks and dental floss  - Use electric shaver for shaving  - Use soft bristle tooth brush  - Limit straining and forceful nose blowing  Outcome: Progressing     Problem: METABOLIC/FLUID AND ELECTROLYTES - ADULT  Goal: Glucose maintained within prescribed range  Description: INTERVENTIONS:  - Monitor Blood Glucose as ordered  - Assess for signs and symptoms of hyperglycemia and hypoglycemia  - Administer ordered medications to maintain glucose within target range  - Assess barriers to adequate nutritional intake and initiate nutrition consult as needed  - Instruct patient on self management of diabetes  Outcome: Progressing  Goal: Electrolytes  maintained within normal limits  Description: INTERVENTIONS:  - Monitor labs and rhythm and assess patient for signs and symptoms of electrolyte imbalances  - Administer electrolyte replacement as ordered  - Monitor response to electrolyte replacements, including rhythm and repeat lab results as appropriate  - Fluid restriction as ordered  - Instruct patient on fluid and nutrition restrictions as appropriate  Outcome: Progressing  Goal: Hemodynamic stability and optimal renal function maintained  Description: INTERVENTIONS:  - Monitor labs and assess for signs and symptoms of volume excess or deficit  - Monitor intake, output and patient weight  - Monitor urine specific gravity, serum osmolarity and serum sodium as indicated or ordered  - Monitor response to interventions for patient's volume status, including labs, urine output, blood pressure (other measures as available)  - Encourage oral intake as appropriate  - Instruct patient on fluid and nutrition restrictions as appropriate  Outcome: Progressing  Patient went for upper and lower endoscopy, gastritis, bleeding found,clip placed. Patient start on clear liquid diet.   Now went down for hemodialysis.     - Provide timely, complete, and accurate information to patient/family  - Incorporate patient and family knowledge, values, beliefs, and cultural backgrounds into the planning and delivery of care  - Encourage patient/family to participate in care and decision-making at the level they choose  - Honor patient and family perspectives and choices  Outcome: Progressing

## 2024-06-03 NOTE — PROGRESS NOTES
[facility] Cardiology  Consultation Note      Ollie Hernández Patient Status:  Observation    1945 MRN [id number]   Location [facility] 5SW/SE Attending Wili Parmar MD   Hosp Day # 0 PCP Wili Parmar MD     Reason for consult: GIB, recent PCI    Subjective: BM this morning, no blood. No CP or SOB.     Impression:  79 year old male admitted with rectal bleeding    ABLA Hgb 11.5 (24) -> 9.8   CAD - PCI LCX with Ramin [place] ABIMBOLA x 1 (24)  PAF on Eliquis  NSVT on Zio patch  Chronic HFpEF - compensated  ESRD on HD MWF  DM2  HTN  KRAIG on CPAP  LVEF 60% (echo )    Recommendations:  Risk of life threatening stent thrombosis outweighs risk of life threatening hemorrhage at present. Restarted ASA + plavix.   Follow Hgb closely and transfuse for Hgb < 8 given CAD. Scope today with GI.  Can continue holding Eliquis. Consider Watchman in the future.   Currently hypertensive, continue coreg, norvasc and hydralazine cautiously.     Primary cardiologist: Herman Phelan MD     History of Present Illness:  Ollie Hernández is a 79 year old male with CAD and PCI to LCX (24, abnormal stress test, obtained due to recurrent NSVT on Zio patch), PAF on Eliquis, chronic HFpEF, ESRD on HD, was on ASA, plavix, Eliquis x 1 week post PCI then stopped ASA - who presented to [facility] on 2024 with rectal bleeding x 3 days.  Hgb 11.5 -> 9.8. Last blood in stool this morning. Denies CP, SOB.     Medications:  Current Facility-Administered Medications   Medication Dose Route Frequency    sodium chloride 0.9 % IV bolus 100 mL  100 mL Intravenous Q30 Min PRN    And    albumin human (Albumin) 25% injection 25 g  25 g Intravenous PRN Dialysis    albuterol (Ventolin HFA) 108 (90 Base) MCG/ACT inhaler 2 puff  2 puff Inhalation Q4H PRN    atorvastatin (Lipitor) tab 40 mg  40 mg Oral Nightly    fluticasone furoate-vilanterol (Breo Ellipta) 200-25 MCG/ACT inhaler 1 puff  1 puff Inhalation Daily     carvedilol (Coreg) tab 25 mg  25 mg Oral BID    cetirizine (ZyrTEC) tab 5 mg  5 mg Oral QOD    cholecalciferol (Vitamin D3) tab 5,000 Units  5,000 Units Oral BID    escitalopram (Lexapro) tab 5 mg  5 mg Oral Daily    amLODIPine (Norvasc) tab 10 mg  10 mg Oral Daily    fluticasone propionate (Flonase) 50 MCG/ACT nasal suspension 2 spray  2 spray Nasal Daily    hydrALAZINE (Apresoline) tab 25 mg  25 mg Oral Q8H STEPHANIE    methocarbamol (Robaxin) tab 500 mg  500 mg Oral BID PRN    pantoprazole (Protonix) DR tab 40 mg  40 mg Oral QAM AC    traMADol (Ultram) tab 50 mg  50 mg Oral Q12H PRN    tetrahydrozoline (Visine) 0.05 % ophthalmic solution 1 drop  1 drop Both Eyes BID PRN    glucose (Dex4) 15 GM/59ML oral liquid 15 g  15 g Oral Q15 Min PRN    Or    glucose (Glutose) 40% oral gel 15 g  15 g Oral Q15 Min PRN    Or    glucose-vitamin C (Dex-4) chewable tab 4 tablet  4 tablet Oral Q15 Min PRN    Or    dextrose 50% injection 50 mL  50 mL Intravenous Q15 Min PRN    Or    glucose (Dex4) 15 GM/59ML oral liquid 30 g  30 g Oral Q15 Min PRN    Or    glucose (Glutose) 40% oral gel 30 g  30 g Oral Q15 Min PRN    Or    glucose-vitamin C (Dex-4) chewable tab 8 tablet  8 tablet Oral Q15 Min PRN    acetaminophen (Tylenol Extra Strength) tab 500 mg  500 mg Oral Q4H PRN    melatonin tab 3 mg  3 mg Oral Nightly PRN    ondansetron (Zofran) 4 MG/2ML injection 4 mg  4 mg Intravenous Q6H PRN    metoclopramide (Reglan) 5 mg/mL injection 5 mg  5 mg Intravenous Q8H PRN    insulin aspart (NovoLOG) 100 Units/mL FlexPen 1-5 Units  1-5 Units Subcutaneous TID CC    hydrocortisone (Anusol-HC) 25 MG rectal suppository 25 mg  25 mg Rectal BID    clopidogrel (Plavix) tab 75 mg  75 mg Oral Daily    aspirin DR tab 81 mg  81 mg Oral Daily     Facility-Administered Medications Ordered in Other Encounters   Medication Dose Route Frequency    lactated ringers infusion   Intravenous Continuous PRN    propofol (Diprivan) 10 mg/mL infusion premix   Intravenous  Continuous PRN    propofol (Diprivan) 10 MG/ML injection   Intravenous PRN       Past Medical History:    Anemia    Asthma (HCC)    Back problem    BPH (benign prostatic hyperplasia)    Calculus of kidney    Cataract    Diabetes (HCC)    ESRD (end stage renal disease) on dialysis (HCC)    Essential hypertension    High blood pressure    High cholesterol    History of blood transfusion    Hyperlipidemia    Neuropathy    hands and feet    KRAIG on CPAP    Renal disorder    Sleep apnea    Visual impairment    glasses    Vocal cord paralysis, unilateral partial       Past Surgical History:   Procedure Laterality Date    Appendectomy          Back surgery      Neck/back -     Capsule endoscopy - internal referral      Cataract      2021 and 2022    Colonoscopy      Colonoscopy N/A 2021    Procedure: COLONOSCOPY;  Surgeon: Michael Gautam MD;  [facility] ENDO    Hand/finger surgery unlisted      Accidental trauma    Upper gi endoscopy,diagnosis         Family History  family history includes Breast Cancer in his mother; Cancer in his father; Diabetes in his maternal grandfather, maternal grandmother, and mother; Heart Disorder in an other family member.    Social History   reports that he quit smoking about 43 years ago. His smoking use included cigarettes. He started smoking about 60 years ago. He has a 17 pack-year smoking history. He has never used smokeless tobacco. He reports that he does not drink alcohol and does not use drugs.     Allergies  Allergies   Allergen Reactions    Adhesive Tape OTHER (SEE COMMENTS)     Severe rashes    Dust Mite Extract RASH       Review of Systems:  As per HPI, otherwise 10 point ROS is negative in detail.    Physical Exam:  Blood pressure 156/62, pulse 71, temperature 97.3 °F (36.3 °C), temperature source Oral, resp. rate 15, height 5' 3\" (1.6 m), weight 157 lb 1.6 oz (71.3 kg), SpO2 98%.  Temp (24hrs), Av.8 °F (36.6 °C), Min:97.3 °F (36.3 °C), Max:98.4 °F  (36.9 °C)    Wt Readings from Last 3 Encounters:   06/02/24 157 lb 1.6 oz (71.3 kg)   05/28/24 163 lb 9.6 oz (74.2 kg)   05/22/24 152 lb 1.6 oz (69 kg)       General: Awake and alert; in no acute distress  HEENT: Extraocular movements are intact; sclerae are anicteric; scalp is atraumatic  Neck: Supple; no JVD; no carotid bruits  Cardiac: Regular rate and regular rhythm; normal S1 and S2, no murmurs, rubs, or gallops are appreciated  Lungs: Clear to auscultation bilaterally; no accessory muscle use is noted, no wheezes, rhonci or rales  Abdomen: Soft, non-distended, non-tender; bowel sounds are normoactive  Extremities: Warm, no edema, clubbing or cyanosis; moves all 4 extremities normally, distal pulses intact and equal  Psychiatric: Normal mood and affect; answers questions appropriately  Dermatologic: No rashes; normal skin turgor    Diagnostic testing:    Labs:   No results found for: \"PT\", \"INR\"     Lab Results   Component Value Date    WBC 5.3 06/03/2024    HGB 10.5 06/03/2024    HCT 32.1 06/03/2024    .0 06/03/2024    CREATSERUM 5.86 06/03/2024    BUN 40 06/03/2024     06/03/2024    K 3.8 06/03/2024    CL 98 06/03/2024    CO2 27.0 06/03/2024     06/03/2024    CA 9.5 06/03/2024    ALB 3.7 06/03/2024    PHOS 5.0 06/03/2024    PGLU 138 06/03/2024       Cardiac diagnostics:    Cath 5/21/24: LM 20%, LAD 70% apex, LCX 80%, RCA small, s/p ABIMBOLA x 1 LCX (2.75 x 15 mm)    Luis Orozco MD  Interventional Cardiology  [place]

## 2024-06-03 NOTE — OPERATIVE REPORT
EGD/Colonoscopy Operative Report    Ollie Hernández Patient Status:  Observation    1945 MRN [id number]   Location [facility] ENDOSCOPY LAB SUITES Attending Wili Parmar MD   Hosp Day #   0 PCP Wili Parmar MD     Pre-Operative Diagnosis: anemia, rectal bleeding    Post-Operative Diagnosis:    Normal esophagus except for small hiatal hernia.     Minimal gastritis, otherwise normal gastric examination.    Normal duodenum   There was a small hepatic flexure vs proximal transverse colon bleeding lesion (1mm in size)- AVM vs dieulafoy's lesion that was actively bleeding.  This was cauterized using APC.  This site was clipped X 1.     There was an ascending colon sessile polyp, 8mm, fully removed with hot cautery snare.     No other colon abnormalities.    Normal terminal ileum   A couple of small mouth left diverticuli.    Small internal hemorrhoids      Procedure Performed:   EGD   COLONOSCOPY with APC and clip and hot snare  Informed Consent: Informed consent for both the procedure and sedation were obtained from the patient. The potentially life-threatening complications of sedation, bleeding,  perforation, transfusion or repeat endoscopy were reviewed along with the possible need for hospitalization, surgical management, transfusion or repeat endoscopy should one of these complications arise. The patient understands and is agreeable to proceed.  Sedation Type: MAC-Patient received sedation with monitored anesthesia provided by an anesthesiologist    Moderate Sedation Time: None.  Deep sedation provided by anesthesia.    Cecum Withdrawal Time:  11 min    Date of previous colonoscopy: unknown    Procedure Description: The patient was placed in the left lateral decubitus position. A bite block was placed in the patient’s mouth. The endoscope was inserted  Refaxed to dentist office.    through the mouth and advanced under direct visualization to the 3rd portion of the duodenum and was then withdrawn to examine the duodenal bulb and gastric antrum.  The endoscope was then retroflexed to examine the angulus, GE junction, cardia, body and fundus,  then withdrawn to examine the esophagus. The endoscope was then removed from the patient. The patient tolerated the procedure well with no immediate complications. The patient was then repositioned for colonoscopy.  After careful digital rectal examination, the Pediatric colonoscope was inserted into the rectum and advanced to the level of the cecum under direct visualization. The cecum was identified by landmarks, including the appendiceal orifice and ileoceccal valve. Careful examination of the entire colon was performed during withdrawal of the endoscope. The scope was withdrawn to the rectum and retroflexion was performed.  The patient tolerated the procedure well with no immediate complications. The patient was transferred to the recovery area in stable condition.   Quality of Preparation: Adequate  [place] Bowel Prep Scale:  Good - 2    Findings:    ESOPHAGUS:  Normal esophagus.  The Z-line and GE junction noted at 38cms from the incisors and was normal. Small 1cm sliding hiatal hernia noted.    STOMACH:   Minimal gastric erythema suggestive of minimal gastritis, however no other abnormalities. Retroflexed view was unremarkable.     DUODENUM:  normal visualized duodenum to the 3rd portion   COLON:    1.  There was a small hepatic flexure vs proximal transverse colon bleeding lesion (1mm in size)- AVM vs dieulafoy's lesion that was actively bleeding.  This was cauterized using APC.  This site was clipped X 1.    2.  There was an ascending colon sessile polyp, 8mm, fully removed with hot cautery snare.    3.  No other colon abnormalities.   4.  Normal terminal ileum  5.  A couple of small mouth left diverticuli.   6.  Small internal  hemorrhoids      Recommendations:    Follow up pathology results   Hold plavix X 2 days.  Can restart in two days if no signs of bleeding.    Fiber supplementation such as benefiber or citrucel daily.    Discharge:  The patient was given an after visit summary detailing the procedure, findings, recommendations and follow up plans.  Gabriel Saldana MD  6/3/2024  12:18 PM

## 2024-06-03 NOTE — ANESTHESIA PREPROCEDURE EVALUATION
Anesthesia PreOp Note    HPI:     Ollie Hernández is a 79 year old male who presents for preoperative consultation requested by: Gabriel Saldana MD    Date of Surgery: 6/1/2024 - 6/3/2024    Procedure(s):  ESOPHAGOGASTRODUODENOSCOPY (EGD)  COLONOSCOPY  Indication: None given    Relevant Problems   No relevant active problems       NPO:  Last Liquid Consumption Date: 06/03/24  Last Liquid Consumption Time: 0845  Last Solid Consumption Date: 06/01/24  Last Solid Consumption Time: 0800  Last Liquid Consumption Date: 06/03/24          History Review:  Patient Active Problem List    Diagnosis Date Noted    Lower GI bleed 06/01/2024    ESRD (end stage renal disease) on dialysis (Formerly [facility]) 06/01/2024    Unstable angina (Formerly [facility]) 05/22/2024    Smokers' cough (Formerly [facility]) 02/29/2024    Primary hypertension     Secondary hyperparathyroidism (Formerly [facility]) 02/10/2022    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 (Formerly [facility]) 02/10/2022    Atherosclerosis of native arteries of extremities with intermittent claudication, bilateral legs (Formerly [facility]) 02/10/2022    Chronic obstructive asthma (Formerly [facility]) 11/14/2021    Vitamin D deficiency 02/01/2021    Chronic diastolic congestive heart failure (Formerly [facility]) 03/02/2020    Anemia of chronic renal failure 12/04/2019    KRAIG (obstructive sleep apnea) 04/25/2017    Pulmonary HTN (Formerly [facility]) 03/08/2016    Mixed hyperlipidemia 02/11/2016    Gout 11/25/2013    Type 2 diabetes mellitus with chronic kidney disease on chronic dialysis, with long-term current use of insulin (Formerly [facility]) 03/10/2005       Past Medical History:    Anemia    Asthma (Formerly [facility])    Back problem    BPH (benign prostatic hyperplasia)    Calculus of kidney    Cataract    Diabetes (Formerly [facility])    ESRD (end stage renal disease) on dialysis ([facility]ly [facility])    Essential hypertension    High blood pressure    High cholesterol    History of blood transfusion    Hyperlipidemia    Neuropathy    hands and feet    KRAIG on CPAP    Renal  disorder    Sleep apnea    Visual impairment    glasses    Vocal cord paralysis, unilateral partial       Past Surgical History:   Procedure Laterality Date    Appendectomy      1981    Back surgery      Neck/back - 1998    Capsule endoscopy - internal referral      Cataract      12/2021 and 1/2022    Colonoscopy      Colonoscopy N/A 1/25/2021    Procedure: COLONOSCOPY;  Surgeon: Michael Gautam MD;  [facility]    Hand/finger surgery unlisted      Accidental trauma    Upper gi endoscopy,diagnosis         Medications Prior to Admission   Medication Sig Dispense Refill Last Dose    hydrALAZINE 25 MG Oral Tab Take 1 tablet (25 mg total) by mouth every 8 (eight) hours. 90 tablet 1     clopidogrel 75 MG Oral Tab Take 1 tablet (75 mg total) by mouth daily. 90 tablet 1     linaGLIPtin (TRADJENTA) 5 mg Oral Tab Take 1 tablet (5 mg total) by mouth daily. 90 tablet 0     escitalopram 5 MG Oral Tab Take 1 tablet (5 mg total) by mouth daily. 90 tablet 3     carvedilol 25 MG Oral Tab Take 1 tablet (25 mg total) by mouth 2 (two) times daily.       atorvastatin 40 MG Oral Tab Take 1 tablet (40 mg total) by mouth nightly. 90 tablet 3     apixaban 5 MG Oral Tab Take 1 tablet (5 mg total) by mouth 2 (two) times daily.       acetaminophen 500 MG Oral Tab Take 1 tablet (500 mg total) by mouth daily as needed for Pain.       cetirizine 10 MG Oral Tab Take 1 tablet (10 mg total) by mouth every other day.       Cholecalciferol 125 MCG (5000 UT) Oral Tab Take 1 tablet (5,000 Units total) by mouth 2 (two) times daily.       polyethylene glycol, PEG 3350, 17 g Oral Powd Pack 17 g Wed, Thurs, Sat       tetrahydrozoline 0.05 % Ophthalmic Solution 1 drop 2 (two) times daily as needed.       traMADol 50 MG Oral Tab Take 1 tablet (50 mg total) by mouth every 12 (twelve) hours as needed for Pain.       Glucose Blood (CONTOUR NEXT TEST) In Vitro Strip Test 3 times daily 300 each 1     felodipine ER 10 MG Oral Tablet 24 Hr Take 1 tablet  (10 mg total) by mouth daily. 90 tablet 3     fluticasone propionate 50 MCG/ACT Nasal Suspension 2 sprays by Nasal route daily. 48 g 3     pantoprazole 40 MG Oral Tab EC Take 1 tablet (40 mg total) by mouth every morning before breakfast. 90 tablet 3     methocarbamol 500 MG Oral Tab Take 1 tablet (500 mg total) by mouth 2 (two) times daily as needed. 60 tablet 1     Insulin Pen Needle (PEN NEEDLES) 32G X 4 MM Does not apply Misc 1 each daily. 100 each 0     albuterol (PROAIR HFA) 108 (90 Base) MCG/ACT Inhalation Aero Soln Inhale 2 puffs into the lungs every 4 (four) hours as needed for Wheezing. 3 each 3     Budesonide-Formoterol Fumarate (SYMBICORT) 160-4.5 MCG/ACT Inhalation Aerosol Inhale 2 puffs into the lungs 2 (two) times daily. (Patient taking differently: Inhale 2 puffs into the lungs 2 (two) times daily as needed.) 3 each 3     Darbepoetin Randell 60 MCG/ML Injection Solution Inject into the vein as needed.        Current Facility-Administered Medications Ordered in Epic   Medication Dose Route Frequency Provider Last Rate Last Admin    sodium chloride 0.9 % IV bolus 100 mL  100 mL Intravenous Q30 Min PRN Walker Klein MD        And    albumin human (Albumin) 25% injection 25 g  25 g Intravenous PRN Dialysis Walker Klein MD        albuterol (Ventolin HFA) 108 (90 Base) MCG/ACT inhaler 2 puff  2 puff Inhalation Q4H PRN Wili Parmar MD        atorvastatin (Lipitor) tab 40 mg  40 mg Oral Nightly Wili Parmar MD   40 mg at 06/02/24 2022    fluticasone furoate-vilanterol (Breo Ellipta) 200-25 MCG/ACT inhaler 1 puff  1 puff Inhalation Daily Wili Parmar MD   1 puff at 06/03/24 0900    carvedilol (Coreg) tab 25 mg  25 mg Oral BID Wili Parmar MD   25 mg at 06/03/24 0840    cetirizine (ZyrTEC) tab 5 mg  5 mg Oral QOD Wili Parmar MD   5 mg at 06/01/24 0945    cholecalciferol (Vitamin D3) tab 5,000 Units  5,000 Units Oral BID Wili Parmar MD   5,000 Units at 06/03/24 0840    escitalopram (Lexapro)  tab 5 mg  5 mg Oral Daily Wili aPrmar MD   5 mg at 06/03/24 0840    amLODIPine (Norvasc) tab 10 mg  10 mg Oral Daily Wili Parmar MD   10 mg at 06/03/24 0840    fluticasone propionate (Flonase) 50 MCG/ACT nasal suspension 2 spray  2 spray Nasal Daily Wili Parmar MD   2 spray at 06/03/24 0900    hydrALAZINE (Apresoline) tab 25 mg  25 mg Oral Q8H STEPHANIE Wili Parmar MD   25 mg at 06/03/24 0456    methocarbamol (Robaxin) tab 500 mg  500 mg Oral BID PRN Wili Parmar MD        pantoprazole (Protonix) DR tab 40 mg  40 mg Oral QAM AC Wili Parmar MD   40 mg at 06/03/24 0500    traMADol (Ultram) tab 50 mg  50 mg Oral Q12H PRN Wili Parmar MD        tetrahydrozoline (Visine) 0.05 % ophthalmic solution 1 drop  1 drop Both Eyes BID PRN Wili Parmar MD        glucose (Dex4) 15 GM/59ML oral liquid 15 g  15 g Oral Q15 Min PRWili Lindsey MD        Or    glucose (Glutose) 40% oral gel 15 g  15 g Oral Q15 Min PRWili Lindsey MD        Or    glucose-vitamin C (Dex-4) chewable tab 4 tablet  4 tablet Oral Q15 Min PRWili Lindsey MD        Or    dextrose 50% injection 50 mL  50 mL Intravenous Q15 Min PRWili Lindsey MD        Or    glucose (Dex4) 15 GM/59ML oral liquid 30 g  30 g Oral Q15 Min Wili Shakns MD        Or    glucose (Glutose) 40% oral gel 30 g  30 g Oral Q15 Min Wili Shanks MD        Or    glucose-vitamin C (Dex-4) chewable tab 8 tablet  8 tablet Oral Q15 Min PRWili Lindsey MD        acetaminophen (Tylenol Extra Strength) tab 500 mg  500 mg Oral Q4H PRN Wili Parmar MD   500 mg at 06/02/24 1211    melatonin tab 3 mg  3 mg Oral Nightly PRN Wili Parmar MD        ondansetron (Zofran) 4 MG/2ML injection 4 mg  4 mg Intravenous Q6H PRN Wili Parmar MD        metoclopramide (Reglan) 5 mg/mL injection 5 mg  5 mg Intravenous Q8H PRN Wili Parmar MD        insulin aspart (NovoLOG) 100 Units/mL FlexPen 1-5 Units  1-5 Units Subcutaneous TID CC Wili Parmar MD        hydrocortisone  (Anusol-HC) 25 MG rectal suppository 25 mg  25 mg Rectal BID Jeanne Grider MD   25 mg at 24 1211    clopidogrel (Plavix) tab 75 mg  75 mg Oral Daily Juan Moore MD   75 mg at 24 0844    aspirin DR tab 81 mg  81 mg Oral Daily Juan Moore MD   81 mg at 24 0845     No current Epic-ordered outpatient medications on file.       Allergies   Allergen Reactions    Adhesive Tape OTHER (SEE COMMENTS)     Severe rashes    Dust Mite Extract RASH       Family History   Problem Relation Age of Onset    Cancer Father         Kidney    Breast Cancer Mother     Diabetes Mother     Diabetes Maternal Grandmother     Diabetes Maternal Grandfather     Heart Disorder Other         Uncle     Social History     Socioeconomic History    Marital status:    Tobacco Use    Smoking status: Former     Current packs/day: 0.00     Average packs/day: 1 pack/day for 17.0 years (17.0 ttl pk-yrs)     Types: Cigarettes     Start date: 1964     Quit date: 1981     Years since quittin.4    Smokeless tobacco: Never   Vaping Use    Vaping status: Never Used   Substance and Sexual Activity    Alcohol use: No     Alcohol/week: 0.0 standard drinks of alcohol    Drug use: No    Sexual activity: Yes     Partners: Female       Available pre-op labs reviewed.  Lab Results   Component Value Date    WBC 5.3 2024    RBC 3.39 (L) 2024    HGB 10.5 (L) 2024    HCT 32.1 (L) 2024    MCV 94.7 2024    MCH 31.0 2024    MCHC 32.7 2024    RDW 17.9 (H) 2024    .0 2024     Lab Results   Component Value Date     (L) 2024    K 3.8 2024    CL 98 2024    CO2 27.0 2024    BUN 40 (H) 2024    CREATSERUM 5.86 (H) 2024     (H) 2024    PGLU 138 (H) 2024    CA 9.5 2024          Vital Signs:  Body mass index is 27.83 kg/m².   height is 1.6 m (5' 3\") and weight is 71.3 kg (157 lb 1.6 oz). His oral temperature is 97.3 °F  (36.3 °C). His blood pressure is 156/62 and his pulse is 71. His respiration is 15 and oxygen saturation is 98%.   Vitals:    06/02/24 2019 06/03/24 0452 06/03/24 0900 06/03/24 1048   BP: 151/62 153/66 156/62    Pulse: 67 75  71   Resp: 16 18 18 15   Temp: 97.8 °F (36.6 °C) 97.7 °F (36.5 °C) 97.3 °F (36.3 °C)    TempSrc: Oral Oral Oral    SpO2: 100% 99% 100% 98%   Weight:       Height:            Anesthesia Evaluation      No history of anesthetic complications   Airway   Mallampati: III  TM distance: >3 FB  Neck ROM: full  Dental      Pulmonary     breath sounds clear to auscultation  (+) COPD, asthma, sleep apnea on CPAP  (-) shortness of breath, decreased breath sounds, wheezes  Cardiovascular   Exercise tolerance: poor  (+) hypertension, CHF  (-) pacemaker, past MI, CAD, CABG/stent, murmur    Rhythm: regular  Rate: normal    Neuro/Psych    (-) TIA, CVA    GI/Hepatic/Renal      Endo/Other    (+) diabetes mellitus type 2  Abdominal                  Anesthesia Plan:   ASA:  3  Plan:   MAC  Informed Consent Plan and Risks Discussed With:  Patient      I have informed Ollie Betty and/or legal guardian or family member of the nature of the anesthetic plan, benefits, risks including possible dental damage if relevant, major complications, and any alternative forms of anesthetic management.   All of the patient's questions were answered to the best of my ability. The patient desires the anesthetic management as planned.  Terrell Mari MD  6/3/2024 12:00 PM  Present on Admission:  **None**

## 2024-06-03 NOTE — PROGRESS NOTES
[facility]  part of [facility]    Progress Note    Ollie Hernández Patient Status:  Observation    1945 MRN [id number]   Location [facility] 5SW/SE Attending Wili Parmar MD   Hosp Day # 0 PCP Wili Parmar MD       SUBJECTIVE:  Some abdomina bloating. Still with red stools but slowing down since Saturday.    OBJECTIVE:  Vital signs in last 24 hours:  /66 (BP Location: Left arm)   Pulse 75   Temp 97.7 °F (36.5 °C) (Oral)   Resp 18   Ht 5' 3\" (1.6 m)   Wt 157 lb 1.6 oz (71.3 kg)   SpO2 99%   BMI 27.83 kg/m²     Intake/Output:    Intake/Output Summary (Last 24 hours) at 6/3/2024 0722  Last data filed at 2024 2300  Gross per 24 hour   Intake 2236 ml   Output --   Net 2236 ml       Wt Readings from Last 3 Encounters:   24 157 lb 1.6 oz (71.3 kg)   24 163 lb 9.6 oz (74.2 kg)   24 152 lb 1.6 oz (69 kg)       EXAM:  GENERAL: well developed, well nourished, in no apparent distress  LUNGS: clear to auscultation  CARDIO: RRR, normal S1S2, without murmur or gallop  GI: soft, NT, ND, NABS  EXTREMITIES: no cyanosis, clubbing or edema    Data Review:     Labs:   Lab Results   Component Value Date    WBC 5.3 2024    HGB 10.5 2024    HCT 32.1 2024    .0 2024    CREATSERUM 5.86 2024    BUN 40 2024     2024    K 3.8 2024    CL 98 2024    CO2 27.0 2024     2024    CA 9.5 2024    ALB 3.7 2024    PHOS 5.0 2024         Imaging:  No results found.            Meds:   Current Facility-Administered Medications   Medication Dose Route Frequency    sodium chloride 0.9 % IV bolus 100 mL  100 mL Intravenous Q30 Min PRN    And    albumin human (Albumin) 25% injection 25 g  25 g Intravenous PRN Dialysis    albuterol (Ventolin HFA) 108 (90 Base) MCG/ACT inhaler 2 puff  2 puff Inhalation Q4H PRN    atorvastatin (Lipitor) tab 40 mg  40 mg Oral Nightly    fluticasone  furoate-vilanterol (Breo Ellipta) 200-25 MCG/ACT inhaler 1 puff  1 puff Inhalation Daily    carvedilol (Coreg) tab 25 mg  25 mg Oral BID    cetirizine (ZyrTEC) tab 5 mg  5 mg Oral QOD    cholecalciferol (Vitamin D3) tab 5,000 Units  5,000 Units Oral BID    escitalopram (Lexapro) tab 5 mg  5 mg Oral Daily    amLODIPine (Norvasc) tab 10 mg  10 mg Oral Daily    fluticasone propionate (Flonase) 50 MCG/ACT nasal suspension 2 spray  2 spray Nasal Daily    hydrALAZINE (Apresoline) tab 25 mg  25 mg Oral Q8H STEPHANIE    methocarbamol (Robaxin) tab 500 mg  500 mg Oral BID PRN    pantoprazole (Protonix) DR tab 40 mg  40 mg Oral QAM AC    traMADol (Ultram) tab 50 mg  50 mg Oral Q12H PRN    tetrahydrozoline (Visine) 0.05 % ophthalmic solution 1 drop  1 drop Both Eyes BID PRN    glucose (Dex4) 15 GM/59ML oral liquid 15 g  15 g Oral Q15 Min PRN    Or    glucose (Glutose) 40% oral gel 15 g  15 g Oral Q15 Min PRN    Or    glucose-vitamin C (Dex-4) chewable tab 4 tablet  4 tablet Oral Q15 Min PRN    Or    dextrose 50% injection 50 mL  50 mL Intravenous Q15 Min PRN    Or    glucose (Dex4) 15 GM/59ML oral liquid 30 g  30 g Oral Q15 Min PRN    Or    glucose (Glutose) 40% oral gel 30 g  30 g Oral Q15 Min PRN    Or    glucose-vitamin C (Dex-4) chewable tab 8 tablet  8 tablet Oral Q15 Min PRN    acetaminophen (Tylenol Extra Strength) tab 500 mg  500 mg Oral Q4H PRN    melatonin tab 3 mg  3 mg Oral Nightly PRN    ondansetron (Zofran) 4 MG/2ML injection 4 mg  4 mg Intravenous Q6H PRN    metoclopramide (Reglan) 5 mg/mL injection 5 mg  5 mg Intravenous Q8H PRN    insulin aspart (NovoLOG) 100 Units/mL FlexPen 1-5 Units  1-5 Units Subcutaneous TID CC    hydrocortisone (Anusol-HC) 25 MG rectal suppository 25 mg  25 mg Rectal BID    clopidogrel (Plavix) tab 75 mg  75 mg Oral Daily    aspirin DR tab 81 mg  81 mg Oral Daily       Assessment & Plan    Rectal bleeding   Acute on chronic anemia  -pt with hx of constipation/hemorrhoids/rectal bleeding in the  past (saw GI Dr. Gautam for this in 10/2020)  -last colonoscopy 1/25/21 (Dr. Gautam) -- internal hemorrhoids, colon polyps x 2 (one tubular adenoma)  -eliquis on hold  -plavix continued given recent stent  -suspect internal hemorrhoidal bleed; no external hemorrhoids on exam  -empiric anusol HC suppository BID  -pt had a small BM this am without blood  -Hgb stable 10.5  -GI consult appreciated; plan for EGD and colonoscopy in am 6/3     CAD  -[facility] 5/21/2024 by Dr. Luis Orozco: 70% lesion of apical LAD, 80% lesion of LCx  -S/p PCI of LCx (medical management of the LAD lesion)  -s/p aspirin, plavix and eliquis x 1 week, now just plavix and eliquis  -cardiology consult appreciated; plavix restarted     Pharyngeal dysphagia  --right side hypomobile oropharyngeal dysphagia  - swallow study 4/2024: intermittent shallow and deep laryngeal penetration without aspiration, small zenker's diverticulum, bulky endplate osteophytes contributing to dysphagia  - to see Dr. Del Angel (ENT at [place]) for further imaging     Paroxysmal A-fib  --dx'ed 9/2023  --Zio monitor 10/2023 -- NSVT (normal EF 65%); on carvedilol  - followed by EP Dr. Phelan  --eliquis on hold for rectal bleeding as above     Cervical radiculopathy  Chronic back pain with neuropathy  -CT scan of the cervical neck 7/2019 ; history of cervical decompressive laminectomy with multilevel degenerative disc disease  - s/p physical therapy in the past  --MRI brain and MRI cervical spine done 12/29/22 (MRI brain w/mod chronic microvascular ischemic changes bilat cerebral hemispheres, basal ganglia and thalami; MRI cervical spine w/multilevel spinal stenosis)  --saw NS PA Dr. Araujo in 1/2023; had subsequent MRI thoracic/lumbar spine.  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  Pt was referred for PT in 2/2023.      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 Sevilla  --HgbA1c 5.5 in 9/2023  - Gabapentin for neuropathy  --at home: on Tradjenta 5mg daily (no longer on insulin)  -- SSI in hospital  --'s-120's     ESRD on hemodialysis  - 2/2 prolonged history of diabetes and hypertension  - Follows with Dr. Martinez  - Plan for HD today prior to endoscopy      Hypertension  - carvedilol 25 mg BID, hydralazine 25mg q8h, felodipine 10mg/day (on amlodipine in hosp due to formulary)     Hyperlipidemia  - Continue with On atorvastatin 40 mg daily, aspirin     BPH  -no longer takes trospium or tamsulosin     Pulmonary hypertension  -Comanagement of KRAIG and chronic diastolic heart failure  - stable     KRAIG on CPAP  - sees pulm Dr. Landeros     Cataracts  -Seems to be stable, follows with Dr. Chase of ophthalmology     Gout  Flareup in 6/2022. NO recurrence  -Seems to be stable     Anemia of ESRD  -Baseline hemoglobin seems to be around 10-11  -managed by nephrology; on aranesp  -Hgb decreased as above     Sensorineural hearing loss  Mild-moderate per audiology testing/14/2022.  He may be a hearing aid candidate in the future     Unsteady Gait  Ongoing fatigue due to multiple comorbidities as above  - uses a walker, 2 wheeled-2 constipation.  Does not want a rollator walker at this time  - He is a fall risk with his unsteady gait, fatigue.     Anxiety  Lexapro 5 mg once a day.         VTE PPX: SCDs - Eliquis on hold    Wili Parmar MD, 06/03/24, 8:05 AM

## 2024-06-04 LAB
ALBUMIN SERPL-MCNC: 3.6 G/DL (ref 3.2–4.8)
ANION GAP SERPL CALC-SCNC: 9 MMOL/L (ref 0–18)
BASOPHILS # BLD AUTO: 0.04 X10(3) UL (ref 0–0.2)
BASOPHILS NFR BLD AUTO: 0.4 %
BUN BLD-MCNC: 20 MG/DL (ref 9–23)
BUN/CREAT SERPL: 4.8 (ref 10–20)
CALCIUM BLD-MCNC: 9.4 MG/DL (ref 8.7–10.4)
CHLORIDE SERPL-SCNC: 100 MMOL/L (ref 98–112)
CO2 SERPL-SCNC: 27 MMOL/L (ref 21–32)
CREAT BLD-MCNC: 4.18 MG/DL
DEPRECATED RDW RBC AUTO: 60.6 FL (ref 35.1–46.3)
EGFRCR SERPLBLD CKD-EPI 2021: 14 ML/MIN/1.73M2 (ref 60–?)
EOSINOPHIL # BLD AUTO: 0.07 X10(3) UL (ref 0–0.7)
EOSINOPHIL NFR BLD AUTO: 0.6 %
ERYTHROCYTE [DISTWIDTH] IN BLOOD BY AUTOMATED COUNT: 18.2 % (ref 11–15)
GLUCOSE BLD-MCNC: 102 MG/DL (ref 70–99)
GLUCOSE BLDC GLUCOMTR-MCNC: 137 MG/DL (ref 70–99)
GLUCOSE BLDC GLUCOMTR-MCNC: 145 MG/DL (ref 70–99)
GLUCOSE BLDC GLUCOMTR-MCNC: 195 MG/DL (ref 70–99)
GLUCOSE BLDC GLUCOMTR-MCNC: 97 MG/DL (ref 70–99)
HCT VFR BLD AUTO: 29.7 %
HGB BLD-MCNC: 9.6 G/DL
IMM GRANULOCYTES # BLD AUTO: 0.06 X10(3) UL (ref 0–1)
IMM GRANULOCYTES NFR BLD: 0.5 %
LYMPHOCYTES # BLD AUTO: 0.9 X10(3) UL (ref 1–4)
LYMPHOCYTES NFR BLD AUTO: 7.9 %
MCH RBC QN AUTO: 30.3 PG (ref 26–34)
MCHC RBC AUTO-ENTMCNC: 32.3 G/DL (ref 31–37)
MCV RBC AUTO: 93.7 FL
MONOCYTES # BLD AUTO: 0.61 X10(3) UL (ref 0.1–1)
MONOCYTES NFR BLD AUTO: 5.4 %
NEUTROPHILS # BLD AUTO: 9.7 X10 (3) UL (ref 1.5–7.7)
NEUTROPHILS # BLD AUTO: 9.7 X10(3) UL (ref 1.5–7.7)
NEUTROPHILS NFR BLD AUTO: 85.2 %
OSMOLALITY SERPL CALC.SUM OF ELEC: 285 MOSM/KG (ref 275–295)
PHOSPHATE SERPL-MCNC: 5 MG/DL (ref 2.4–5.1)
PLATELET # BLD AUTO: 216 10(3)UL (ref 150–450)
POTASSIUM SERPL-SCNC: 3.9 MMOL/L (ref 3.5–5.1)
RBC # BLD AUTO: 3.17 X10(6)UL
SODIUM SERPL-SCNC: 136 MMOL/L (ref 136–145)
WBC # BLD AUTO: 11.4 X10(3) UL (ref 4–11)

## 2024-06-04 PROCEDURE — 99233 SBSQ HOSP IP/OBS HIGH 50: CPT | Performed by: INTERNAL MEDICINE

## 2024-06-04 RX ORDER — ASPIRIN 81 MG/1
81 TABLET ORAL DAILY
Status: DISCONTINUED | OUTPATIENT
Start: 2024-06-04 | End: 2024-06-04

## 2024-06-04 RX ORDER — LIDOCAINE AND PRILOCAINE 25; 25 MG/G; MG/G
CREAM TOPICAL AS NEEDED
Status: DISCONTINUED | OUTPATIENT
Start: 2024-06-04 | End: 2024-06-06

## 2024-06-04 RX ORDER — HEPARIN SODIUM 1000 [USP'U]/ML
1.5 INJECTION, SOLUTION INTRAVENOUS; SUBCUTANEOUS
Status: DISCONTINUED | OUTPATIENT
Start: 2024-06-04 | End: 2024-06-06

## 2024-06-04 RX ORDER — ALBUMIN (HUMAN) 12.5 G/50ML
25 SOLUTION INTRAVENOUS
Status: DISCONTINUED | OUTPATIENT
Start: 2024-06-04 | End: 2024-06-06

## 2024-06-04 NOTE — PLAN OF CARE
Problem: Patient Centered Care  Goal: Patient preferences are identified and integrated in the patient's plan of care  Description: Interventions:  - What would you like us to know as we care for you? Lives at home with wife   - Provide timely, complete, and accurate information to patient/family  - Incorporate patient and family knowledge, values, beliefs, and cultural backgrounds into the planning and delivery of care  - Encourage patient/family to participate in care and decision-making at the level they choose  - Honor patient and family perspectives and choices  Outcome: Progressing     Problem: PAIN - ADULT  Goal: Verbalizes/displays adequate comfort level or patient's stated pain goal  Description: INTERVENTIONS:  - Encourage pt to monitor pain and request assistance  - Assess pain using appropriate pain scale  - Administer analgesics based on type and severity of pain and evaluate response  - Implement non-pharmacological measures as appropriate and evaluate response  - Consider cultural and social influences on pain and pain management  - Manage/alleviate anxiety  - Utilize distraction and/or relaxation techniques  - Monitor for opioid side effects  - Notify MD/LIP if interventions unsuccessful or patient reports new pain  - Anticipate increased pain with activity and pre-medicate as appropriate  Outcome: Progressing     Problem: RISK FOR INFECTION - ADULT  Goal: Absence of fever/infection during anticipated neutropenic period  Description: INTERVENTIONS  - Monitor WBC  - Administer growth factors as ordered  - Implement neutropenic guidelines  Outcome: Progressing     Problem: SAFETY ADULT - FALL  Goal: Free from fall injury  Description: INTERVENTIONS:  - Assess pt frequently for physical needs  - Identify cognitive and physical deficits and behaviors that affect risk of falls.  - [place] fall precautions as indicated by assessment.  - Educate pt/family on patient safety including physical  limitations  - Instruct pt to call for assistance with activity based on assessment  - Modify environment to reduce risk of injury  - Provide assistive devices as appropriate  - Consider OT/PT consult to assist with strengthening/mobility  - Encourage toileting schedule  Outcome: Progressing     Problem: DISCHARGE PLANNING  Goal: Discharge to home or other facility with appropriate resources  Description: INTERVENTIONS:  - Identify barriers to discharge w/pt and caregiver  - Include patient/family/discharge partner in discharge planning  - Arrange for needed discharge resources and transportation as appropriate  - Identify discharge learning needs (meds, wound care, etc)  - Arrange for interpreters to assist at discharge as needed  - Consider post-discharge preferences of patient/family/discharge partner  - Complete POLST form as appropriate  - Assess patient's ability to be responsible for managing their own health  - Refer to Case Management Department for coordinating discharge planning if the patient needs post-hospital services based on physician/LIP order or complex needs related to functional status, cognitive ability or social support system  Outcome: Progressing     Problem: HEMATOLOGIC - ADULT  Goal: Free from bleeding injury  Description: (Example usage: patient with low platelets)  INTERVENTIONS:  - Avoid intramuscular injections, enemas and rectal medication administration  - Ensure safe mobilization of patient  - Hold pressure on venipuncture sites to achieve adequate hemostasis  - Assess for signs and symptoms of internal bleeding  - Monitor lab trends  - Patient is to report abnormal signs of bleeding to staff  - Avoid use of toothpicks and dental floss  - Use electric shaver for shaving  - Use soft bristle tooth brush  - Limit straining and forceful nose blowing  Outcome: Progressing     Problem: METABOLIC/FLUID AND ELECTROLYTES - ADULT  Goal: Glucose maintained within prescribed range  Description:  INTERVENTIONS:  - Monitor Blood Glucose as ordered  - Assess for signs and symptoms of hyperglycemia and hypoglycemia  - Administer ordered medications to maintain glucose within target range  - Assess barriers to adequate nutritional intake and initiate nutrition consult as needed  - Instruct patient on self management of diabetes  Outcome: Progressing  Goal: Electrolytes maintained within normal limits  Description: INTERVENTIONS:  - Monitor labs and rhythm and assess patient for signs and symptoms of electrolyte imbalances  - Administer electrolyte replacement as ordered  - Monitor response to electrolyte replacements, including rhythm and repeat lab results as appropriate  - Fluid restriction as ordered  - Instruct patient on fluid and nutrition restrictions as appropriate  Outcome: Progressing  Goal: Hemodynamic stability and optimal renal function maintained  Description: INTERVENTIONS:  - Monitor labs and assess for signs and symptoms of volume excess or deficit  - Monitor intake, output and patient weight  - Monitor urine specific gravity, serum osmolarity and serum sodium as indicated or ordered  - Monitor response to interventions for patient's volume status, including labs, urine output, blood pressure (other measures as available)  - Encourage oral intake as appropriate  - Instruct patient on fluid and nutrition restrictions as appropriate  Outcome: Progressing    Diet advanced to soft/low fiber, dialysis scheduled for tomorrow. Spouse at bedside, updated on plan. Calls appropriately.

## 2024-06-04 NOTE — PAYOR COMM NOTE
--------------  ADMISSION REVIEW   6/1-6/4  Payor: [employer]  Subscriber #:  [id number]  Authorization Number: [id number]    Admit date: 6/3/24  Admit time:  2:05 PM   Admit Orders (From admission, onward)       Start     Ordered    06/03/24 1405  Admit to inpatient Once  Once        Ordering Provider: Wili Parmar MD   Question:  Diagnosis  Answer:  Lower GI bleed    06/03/24 1404    06/01/24 0638  Place in observation Once  (Place Observation REMOTE TELE Medicine)  Once        Ordering Provider: Devaughn Montiel DO   Question:  Diagnosis  Answer:  Lower GI bleed    06/01/24 0637      REVIEW DOCUMENTATION:  ED Provider Notes signed by Devaughn Montiel DO at 6/1/2024  9:38 AM    Patient Seen in: [facility] Emergency Department  History     Chief Complaint   Patient presents with    Anal Problem     Stated Complaint: rectal bleeding    Subjective:   79-year-old male with history of diabetes, CKD on dialysis, CHF, hypertension, hyperlipidemia presenting to the emergency department due to concern for rectal bleeding.    Patient states that rectal bleeding has been noted since Thursday in a consistent manner with each bowel movement.  Patient states that blood is noted to be darker red in appearance.  States that it is noted in the stool and toilet paper after wiping.  States that he is not having any abdominal pain complaints.  States that he has not had any change in his bowel habits with regards to frequency or consistency.  Does not detail any trauma.  Does not detail any pain with defecation.    Of note patient did have recent cardiac stenting and is on blood thinning medications in addition to antiplatelet medications.    Patient does report headache which is been noted after travel and EMS.  States that it was gradual in onset.  Reported 1 episode of dizziness after getting out of the EMS vehicle states that this subsided and does not have dizziness complaints at this time.   Otherwise patient denies fever, chest pain, shortness of breath, N/V/C/D, dysuria or hematuria.    Objective:   Past Medical History:    Anemia    Asthma (HCC)    Back problem    BPH (benign prostatic hyperplasia)    Calculus of kidney    Cataract    Diabetes (HCC)    ESRD (end stage renal disease) on dialysis (HCC)    Essential hypertension    High blood pressure    High cholesterol    History of blood transfusion    Hyperlipidemia    Neuropathy    hands and feet    KRAIG on CPAP    Renal disorder    Sleep apnea    Visual impairment    glasses    Vocal cord paralysis, unilateral partial     Positive for stated complaint: rectal bleeding  Other systems are as noted in HPI.  Constitutional and vital signs reviewed.      All other systems reviewed and negative except as noted above.    Physical Exam     ED Triage Vitals [06/01/24 0202]   /58   Pulse 74   Resp 20   Temp 98.4 °F (36.9 °C)   Temp src Temporal   SpO2 99 %   O2 Device None (Room air)       Current Vitals:   Vital Signs  BP: 156/64  Pulse: 68  Resp: 18  Temp: 98.4 °F (36.9 °C)  Temp src: Oral  MAP (mmHg): 90    Oxygen Therapy  SpO2: 99 %  O2 Device: None (Room air)  Pulse Oximetry Type: Intermittent    Physical Exam:   /64 (BP Location: Left arm)   Pulse 68   Temp 98.4 °F (36.9 °C) (Oral)   Resp 18   Ht 160 cm (5' 3\")   Wt 71.5 kg   SpO2 99%   BMI 27.94 kg/m²  - I reviewed these vital signs    Constitutional: Pt is well appearing, in no distress  HEENT: Normocephalic/Atraumatic, PERRL, EOM grossly normal, Conjunctiva Clear, MMM without Erythema or Lesions, Uvula midline, No Palatal/Oropharyngeal Erythema/Lesions noted  Neck: ROM intact  Lungs: CTA B, No crepitus, Talking in full sentences in no respiratory distress  Cardiovascular: RRR: yes  Abdominal: Normoactive BSs, No rebound or guarding, soft, non-distended, no masses, no discomfort to palpation   Back: No midline ttp  Rectal: Exam performed with chaperone at the bedside, no external  abnormalities seen on exam, no hemorrhoids or fissures seen, no signs of trauma, red feces noted after CARLOS  : deferred  Musculoskeletal: No deformities noted, No cyanosis/clubbing noted, No tenderness to palpation  Neurologic: A&O x 3, Speech clear, No facial asymmetry noted, Equal strength and sensation in extremities appreciated  Psychiatric: Mood and affect are appropriate, Speech not pressured, Thought process is logical  Skin: Warm and dry, No rash  ED Course     Labs Reviewed   COMP METABOLIC PANEL (14) - Abnormal; Notable for the following components:       Result Value    Glucose 164 (*)     BUN 29 (*)     Creatinine 4.17 (*)     BUN/CREA Ratio 7.0 (*)     eGFR-Cr 14 (*)     All other components within normal limits   HEMOGLOBIN + HEMATOCRIT - Abnormal; Notable for the following components:    HGB 9.8 (*)     HCT 31.5 (*)     All other components within normal limits   POCT GLUCOSE - Abnormal; Notable for the following components:    POC Glucose  123 (*)     All other components within normal limits   POCT GLUCOSE - Abnormal; Notable for the following components:    POC Glucose  117 (*)     All other components within normal limits   CBC W/ DIFFERENTIAL - Abnormal; Notable for the following components:    RBC 3.39 (*)     HGB 10.1 (*)     HCT 32.7 (*)     MCHC 30.9 (*)     RDW-SD 61.0 (*)     RDW 17.6 (*)     All other components within normal limits   CBC WITH DIFFERENTIAL WITH PLATELET    Narrative:     The following orders were created for panel order CBC With Differential With Platelet.  Procedure                               Abnormality         Status                     ---------                               -----------         ------                     CBC W/ DIFFERENTIAL[[id number]]          Abnormal            Final result                 Please view results for these tests on the individual orders.   RAINBOW DRAW LAVENDER   RAINBOW DRAW LIGHT GREEN   RAINBOW DRAW BLUE     Medical Decision  Making  79-year-old male presenting to the emergency department due to concern for rectal bleeding.    On arrival to the emergency department patient overall well-appearing and stable with no need for acute intervention.    Differential diagnosis for the patient close following is not limited to: Lower GI bleeding, anemia due to acute blood loss, tension type headache, dehydration.  Do not suspect acute abdomen based off exam as detailed above.  Do not suspect subarachnoid hemorrhage as patient reports headache symptoms beginning gradual onset and worsening steadily with no sudden onset nature detailed.    ED Course as of 06/01/24 0937  ------------------------------------------------------------  Time: 06/01 0325  Comment: CBC without leukocytosis.  Patient afebrile.  Do not suspect acute infection.  Hemoglobin decreased to 10.1 from previous results 11.5.  Platelets within normal limits.  ------------------------------------------------------------  Time: 06/01 0326  Comment: CMP creatinine at 4.17.  Patient is a known ESRD patient on dialysis.  No acute electrolyte abnormality noted otherwise.  ------------------------------------------------------------  Time: 06/01 0343  Value: Hemoglobin(!): 9.8  Comment: (Reviewed)     Due to persistent hemoglobin dropping with CARLOS showing evidence of blood suspect lower GI bleed as a cause for the patient's presentation.  Anemia likely due to blood loss.  Suspect tension type headache as a cause of the patient's headache symptoms.    Have discussed with admitting physician who is agreeable for plan for admission.  Have discussed with cardiology given recent stenting in addition to nephrology given history of ESRD.  Gastroenterology also discussed for consult.    Amount and/or Complexity of Data Reviewed  Independent Historian: caregiver  External Data Reviewed: notes.  Labs: ordered. Decision-making details documented in ED Course.  Discussion of management or test  interpretation with external provider(s): Admitting physician, cardiology, nephrology, gastroenterology    Risk  Decision regarding hospitalization.  Disposition and Plan     Clinical Impression:  1. Lower GI bleed     Disposition:  Admit  6/1/2024  4:08 am  Hospital Problems       Present on Admission  Date Reviewed: 5/28/2024            ICD-10-CM Noted POA    * (Principal) Lower GI bleed K92.2 6/1/2024 Unknown               6/1 H&P  Chief Complaint   Patient presents with    Anal Problem      HPI     The patient is a 79-year-old male with past medical history of coronary artery disease status post recent coronary stenting on 5/21/2024, currently on Plavix, paroxysmal atrial fibrillation on Eliquis, ESRD on hemodialysis, hypertension admitted with persistent rectal bleeding.     The patient notes onset of rectal bleeding beginning Thursday, 5/30/2024.  He notes dark red blood with each bowel movement and when wiping with the toilet paper.  He has a history of chronic constipation and has known internal hemorrhoids.  Last colonoscopy was in 1/2021 after he had noted rectal bleeding.  At that time he was noted to have 2 polyps and internal hemorrhoids.  The patient was started on fiber supplements at that time.  The patient admits to still being intermittently constipated and had perhaps been straining recently.    In the ED, digital rectal exam revealed bright red blood.  Hemoglobin was down slightly from baseline.  Vitals were otherwise stable.  The patient denies any chest pressure or shortness of breath.  He does note a lot of gas/flatus.     The pt notes a history some type of anorectal surgery in the 1980's after sustaining an injury following a fall     Of note the patient underwent cardiac cath with recent stenting of the circumflex on 5/21/2024 and has been on Plavix since that time along with Eliquis for his atrial fibrillation.    Assessment/Plan:       Rectal bleeding   Acute on chronic anemia  -pt with  hx of constipation/hemorrhoids/rectal bleeding in the past (saw GI Dr. Gautam for this in 10/2020)  -last colonoscopy 1/25/21 (Dr. Gautam) -- internal hemorrhoids, colon polyps x 2 (one tubular adenoma)  -Hgb 11.5 (on 5/22/24) > 10.1 (admission) >9.8  -pt had another BM this am with a small amount of blood  -plavix currently on hold but hesitate to hold for much longer given recent coronary stent  -eliqus on hold  -suspect internal hemorrhoidal bleed; no external hemorrhoids on exam  -will trend H/H  -empiric anusol HC suppository BID  -GI consulted     CAD  -[facility] 5/21/2024 by Dr. Luis Orozco: 70% lesion of apical LAD, 80% lesion of LCx  -S/p PCI of LCx (medical management of the LAD lesion)  -s/p aspirin, plavix and eliquis x 1 week, now just plavix and eliquis  -plavix and eliquis on hold for GI bleeding; would not hold plavix for much longer given recent nature of stent  -cardiology consulted     Pharyngeal dysphagia  --right side hypomobile oropharyngeal dysphagia  - swallow study 4/2024: intermittent shallow and deep laryngeal penetration without aspiration, small zenker's diverticulum, bulky endplate osteophytes contributing to dysphagia  - to see Dr. Del Angel (ENT at [facility]) for further imaging     Paroxysmal A-fib  --dx'ed 9/2023  --Zio monitor 10/2023 -- NSVT (normal EF 65%); on carvedilol  - followed by EP Dr. Phelan  --on eliquis (held as above)     Cervical radiculopathy  Chronic back pain with neuropathy  -CT scan of the cervical neck 7/2019 ; history of cervical decompressive laminectomy with multilevel degenerative disc disease  - s/p physical therapy in the past  --MRI brain and MRI cervical spine done 12/29/22 (MRI brain w/mod chronic microvascular ischemic changes bilat cerebral hemispheres, basal ganglia and thalami; MRI cervical spine w/multilevel spinal stenosis)  --saw NS PA Dr. Araujo in 1/2023; had subsequent MRI thoracic/lumbar spine.  Sx attributed to myelopathy due to craniocervical junction  stenosis; surgery deemed high risk.  Pt was referred for PT in 2/2023.      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 Sevilla  --HgbA1c 5.5 in 9/2023  - Gabapentin for neuropathy  --at home: on Tradjenta 5mg daily (no longer on insulin)  -- SSI in hospital  --'s-120's     ESRD on hemodialysis  - 2/2 prolonged history of diabetes and hypertension  - Follows with Dr. Martinez     Hypertension  - carvedilol 25 mg BID, hydralazine 25mg q8h, felodipine 10mg/day (on amlodipine in hosp due to formulary)     Hyperlipidemia  - Continue with On atorvastatin 40 mg daily, aspirin     BPH  -no longer takes trospium or tamsulosin     Pulmonary hypertension  -Comanagement of KRAIG and chronic diastolic heart failure  - stable     KRAIG on CPAP  - sees pulm Dr. Landeros     Cataracts  -Seems to be stable, follows with Dr. Chase of ophthalmology     Gout  Flareup in 6/2022. NO recurrence  -Seems to be stable     Anemia of ESRD  -Baseline hemoglobin seems to be around 10-11  -managed by nephrology; on aranesp  -Hgb decreased as above     Sensorineural hearing loss  Mild-moderate per audiology testing/14/2022.  He may be a hearing aid candidate in the future     Unsteady Gait  Ongoing fatigue due to multiple comorbidities as above  - uses a walker, 2 wheeled-2 constipation.  Does not want a rollator walker at this time  - He is a fall risk with his unsteady gait, fatigue.     Anxiety  Lexapro 5 mg once a day.          6/1 GI        Ollie Hernández is a 79 year old male with rectal bleeding.     The patient presented to the emergency room early this morning with complaints of rectal bleeding.  The patient noted onset of rectal bleeding 2 days ago.  He reported having a dark bloody bowel movement at that time.  He again had a another bloody bowel movement last night and this  morning that were similar to his initial episode.     Of note, the patient recently had a cardiac stent placed.  He was discharged on aspirin, Plavix and Eliquis.  Prior to this, he was taking aspirin and Eliquis.  His last dose of Eliquis was last night.     The patient has a history of colon polyps.  In 2021, he had a colonoscopy.  Two polyps were removed. One polyp was a tubular adenoma.  He also had an EGD and colonoscopy in 2016 for evaluation of anemia.  The EGD revealed a hiatal hernia and his colonoscopy revealed internal hemorrhoids.  A capsule endoscopy also was done which was negative.     He denies hematemesis, abdominal pain or change in bowel function.          Assessment:     1.  GI bleed  2.  History of colon polyps  3.  Hiatal hernia     The patient presents with rectal bleeding for the past 2 days.  He is currently on aspirin, Plavix and Eliquis.  This could be affecting his bleeding.  He has a history of colon polyps and a hiatal hernia.  His last colonoscopy was in 2021 and EGD in 2016.     He is currently hemodynamically stable.  A slight drop in hemoglobin has been noted since 5/22/2024.     The etiology of his GI bleeding is unclear.  Both upper and lower GI etiologies should be considered.  I also am concerned about the possibility of AVMs given his previous history of anemia, cardiac disease, renal failure and his age.        Plan:     Hold Eliquis.  Follow hemoglobin.  EGD and colonoscopy on 6/3/24. The procedure was reviewed with the patient. The patient is aware of the risks, including bleeding, perforation and anesthetic complications. The limitations of the procedure were reviewed. The patient agrees and all questions were answered.            6/1 Cardiology  Reason for consult: GIB, recent PCI     Primary cardiologist: Herman Phelan MD      History of Present Illness:  Ollie Hernández is a 79 year old male with CAD and PCI to LCX (5/21/24, abnormal stress test, obtained due to  recurrent NSVT on Zio patch), PAF on Eliquis, chronic HFpEF, ESRD on HD, was on ASA, plavix, Eliquis x 1 week post PCI then stopped ASA - who presented to [facility]2024 with rectal bleeding x 3 days.  Hgb 11.5 -> 9.8. Last blood in stool this morning. Denies CP, SOB.        Impression:  79 year old male admitted with rectal bleeding     ABLA Hgb 11.5 (5/22/24) -> 9.8   CAD - PCI LCX with ABIMBOLA x 1 (5/21/24)  PAF on Eliquis  NSVT on Zio patch  Chronic HFpEF - compensated  ESRD on HD MWF  DM2  HTN  KRAIG on CPAP  LVEF 60% (echo 9/23)     Recommendations:  Risk of life threatening stent thrombosis outweighs risk of life threatening hemorrhage at present. Restart plavix and aspirin.  Follow Hgb closely and transfuse for Hgb < 8 given CAD. Plan for scope on Monday.  Can continue holding Eliquis. Consider Watchman in the future.   Currently hypertensive, continue coreg, norvasc and hydralazine cautiously.         6/1 Nephrology    Reason for Consultation:      ESRD on HD      History of Present Illness:   Patient is a 79 year old male with past medical history of coronary artery disease s/p PCI on 5/21, A-fib on anticoagulation, ESRD on hemodialysis Monday Wednesday Friday via AV fistula, hypertension who presented to the emergency room for rectal bleeding and maroon-colored stools     Patient received dialysis at [facility] on Monday Wednesday Friday.  Follows with Dr. Clemente/Dr. Jain.  Patient underwent cardiac cath on 5/21 and has been on Plavix.  Also on Eliquis for A-fib     Wife at bedside.  Had maroon-colored stool this morning.  Denies any chest pain or abdominal pain or nausea vomiting.  Tolerated HD well yesterday            Patient is a 79 year old male with past medical history of coronary artery disease s/p PCI on 5/21, A-fib on anticoagulation, ESRD on hemodialysis Monday Wednesday Friday via AV fistula, hypertension who presented to the emergency room for rectal bleeding and  maroon-colored stools     1.ESRD: On HD  HD through AV fistula.  HD at [place] outpatient Monday Wednesday Friday  Next HD on Monday  Check serum phosphorus     2.GI bleed: Blood loss anemia  GI consulted-input noted  EGD and colonoscopy on 6/3  Eliquis on hold     3.coronary artery disease s/p PCI on 5/21  Cardiology consulted.  Aspirin and Plavix restarted while Eliquis on hold        6/2 IM    Assessment & Plan     Rectal bleeding   Acute on chronic anemia  -pt with hx of constipation/hemorrhoids/rectal bleeding in the past (saw GI Dr. Gautam for this in 10/2020)  -last colonoscopy 1/25/21 (Dr. Gautam) -- internal hemorrhoids, colon polyps x 2 (one tubular adenoma)  -eliquis on hold  -plavix continued given recent stent  -suspect internal hemorrhoidal bleed; no external hemorrhoids on exam  -empiric anusol HC suppository BID  -pt had a small BM this am without blood  -Hgb stable  -GI consult appreciated; plan for EGD and colonoscopy in am 6/3     CAD  -[facility] 5/21/2024 by Dr. Luis Orozco: 70% lesion of apical LAD, 80% lesion of LCx  -S/p PCI of LCx (medical management of the LAD lesion)  -s/p aspirin, plavix and eliquis x 1 week, now just plavix and eliquis  -cardiology consult appreciated; plavix restarted     Pharyngeal dysphagia  --right side hypomobile oropharyngeal dysphagia  - swallow study 4/2024: intermittent shallow and deep laryngeal penetration without aspiration, small zenker's diverticulum, bulky endplate osteophytes contributing to dysphagia  - to see Dr. Del Angel (ENT at [facility]) for further imaging     Paroxysmal A-fib  --dx'ed 9/2023  --Zio monitor 10/2023 -- NSVT (normal EF 65%); on carvedilol  - followed by EP Dr. Phelan  --eliquis on hold for rectal bleeding as above     Cervical radiculopathy  Chronic back pain with neuropathy  -CT scan of the cervical neck 7/2019 ; history of cervical decompressive laminectomy with multilevel degenerative disc disease  - s/p physical therapy in the past  --MRI  brain and MRI cervical spine done 12/29/22 (MRI brain w/mod chronic microvascular ischemic changes bilat cerebral hemispheres, basal ganglia and thalami; MRI cervical spine w/multilevel spinal stenosis)  --saw NS PA Dr. Araujo in 1/2023; had subsequent MRI thoracic/lumbar spine.  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  Pt was referred for PT in 2/2023.      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 Sevilla  --HgbA1c 5.5 in 9/2023  - Gabapentin for neuropathy  --at home: on Tradjenta 5mg daily (no longer on insulin)  -- SSI in hospital  --'s-120's     ESRD on hemodialysis  - 2/2 prolonged history of diabetes and hypertension  - Follows with Dr. Martinez     Hypertension  - carvedilol 25 mg BID, hydralazine 25mg q8h, felodipine 10mg/day (on amlodipine in hosp due to formulary)     Hyperlipidemia  - Continue with On atorvastatin 40 mg daily, aspirin     BPH  -no longer takes trospium or tamsulosin     Pulmonary hypertension  -Comanagement of KRAIG and chronic diastolic heart failure  - stable     KRAIG on CPAP  - sees pulm Dr. Landeros     Cataracts  -Seems to be stable, follows with Dr. Chase of ophthalmology     Gout  Flareup in 6/2022. NO recurrence  -Seems to be stable     Anemia of ESRD  -Baseline hemoglobin seems to be around 10-11  -managed by nephrology; on aranesp  -Hgb decreased as above     Sensorineural hearing loss  Mild-moderate per audiology testing/14/2022.  He may be a hearing aid candidate in the future     Unsteady Gait  Ongoing fatigue due to multiple comorbidities as above  - uses a walker, 2 wheeled-2 constipation.  Does not want a rollator walker at this time  - He is a fall risk with his unsteady gait, fatigue.     Anxiety  Lexapro 5 mg once a day.            6/2 GI        ASSESSMENT:     1.  GI bleed  2.  History of colon polyps  3.  Hiatal hernia     He has no further bleeding.      The patient presented with rectal bleeding for 2 days.  He is currently on aspirin, Plavix and Eliquis.  This could be affecting his bleeding.  He has a history of colon polyps and a hiatal hernia.  His last colonoscopy was in 2021 and EGD in 2016.     He is hemodynamically stable.  His hemoglobin is unchanged since admission.      The etiology of his GI bleeding is unclear.  Both upper and lower GI etiologies should be considered.  I also am concerned about the possibility of AVMs given his previous history of anemia, cardiac disease, renal failure and his age.     PLAN:     Continue to hold Eliquis.  Follow hemoglobin.  EGD and colonoscopy tomorrow.         6/2 Cardiology    Reason for consult: GIB, recent PCI     Subjective: BM this morning, no blood. No CP or SOB.      Impression:  79 year old male admitted with rectal bleeding     ABLA Hgb 11.5 (5/22/24) -> 9.8   CAD - PCI LCX with ABIMBOLA x 1 (5/21/24)  PAF on Eliquis  NSVT on Zio patch  Chronic HFpEF - compensated  ESRD on HD MWF  DM2  HTN  KRAIG on CPAP  LVEF 60% (echo 9/23)     Recommendations:  Risk of life threatening stent thrombosis outweighs risk of life threatening hemorrhage at present. Restarted plavix and aspirin.  Follow Hgb closely and transfuse for Hgb < 8 given CAD. Plan for scope on Monday.  Can continue holding Eliquis. Consider Watchman in the future.   Currently hypertensive, continue coreg, norvasc and hydralazine cautiously.         6/2 Nephrology    Patient is a 79 year old male with past medical history of coronary artery disease s/p PCI on 5/21, A-fib on anticoagulation, ESRD on hemodialysis Monday Wednesday Friday via AV fistula, hypertension who presented to the emergency room for rectal bleeding and maroon-colored stools     1.ESRD: On HD  HD through AV fistula.  HD at [place] outpatient Monday Wednesday Friday  Next HD on  Monday  Serum Phos within normal limits     2.GI bleed: Blood loss anemia  GI consulted-input noted  EGD and colonoscopy on 6/3  Eliquis on hold     3.coronary artery disease s/p PCI on 5/21  Cardiology consulted.  Aspirin and Plavix restarted while Eliquis on hold        6/3 IM  SUBJECTIVE:  Some abdomina bloating. Still with red stools but slowing down since Saturday.      Assessment & Plan     Rectal bleeding   Acute on chronic anemia  -pt with hx of constipation/hemorrhoids/rectal bleeding in the past (saw GI Dr. Gautam for this in 10/2020)  -last colonoscopy 1/25/21 (Dr. Gautam) -- internal hemorrhoids, colon polyps x 2 (one tubular adenoma)  -eliquis on hold  -plavix continued given recent stent  -suspect internal hemorrhoidal bleed; no external hemorrhoids on exam  -empiric anusol HC suppository BID  -pt had a small BM this am without blood  -Hgb stable 10.5  -GI consult appreciated; plan for EGD and colonoscopy in am 6/3     CAD  -[facility] 5/21/2024 by Dr. Luis Orozco: 70% lesion of apical LAD, 80% lesion of LCx  -S/p PCI of LCx (medical management of the LAD lesion)  -s/p aspirin, plavix and eliquis x 1 week, now just plavix and eliquis  -cardiology consult appreciated; plavix restarted     Pharyngeal dysphagia  --right side hypomobile oropharyngeal dysphagia  - swallow study 4/2024: intermittent shallow and deep laryngeal penetration without aspiration, small zenker's diverticulum, bulky endplate osteophytes contributing to dysphagia  - to see Dr. Del Angel (ENT at [place]) for further imaging     Paroxysmal A-fib  --dx'ed 9/2023  --Zio monitor 10/2023 -- NSVT (normal EF 65%); on carvedilol  - followed by EP Dr. Phelan  --eliquis on hold for rectal bleeding as above     Cervical radiculopathy  Chronic back pain with neuropathy  -CT scan of the cervical neck 7/2019 ; history of cervical decompressive laminectomy with multilevel degenerative disc disease  - s/p physical therapy in the past  --MRI brain and MRI cervical  spine done 12/29/22 (MRI brain w/mod chronic microvascular ischemic changes bilat cerebral hemispheres, basal ganglia and thalami; MRI cervical spine w/multilevel spinal stenosis)  --saw VINICIO Araujo in 1/2023; had subsequent MRI thoracic/lumbar spine.  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  Pt was referred for PT in 2/2023.      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 Sevilla  --HgbA1c 5.5 in 9/2023  - Gabapentin for neuropathy  --at home: on Tradjenta 5mg daily (no longer on insulin)  -- SSI in hospital  --'s-120's     ESRD on hemodialysis  - 2/2 prolonged history of diabetes and hypertension  - Follows with Dr. Martinez  - Plan for HD today prior to endoscopy      Hypertension  - carvedilol 25 mg BID, hydralazine 25mg q8h, felodipine 10mg/day (on amlodipine in hosp due to formulary)     Hyperlipidemia  - Continue with On atorvastatin 40 mg daily, aspirin     BPH  -no longer takes trospium or tamsulosin     Pulmonary hypertension  -Comanagement of KRAIG and chronic diastolic heart failure  - stable     KRAIG on CPAP  - sees pulm Dr. Landeros     Cataracts  -Seems to be stable, follows with Dr. Chase of ophthalmology     Gout  Flareup in 6/2022. NO recurrence  -Seems to be stable     Anemia of ESRD  -Baseline hemoglobin seems to be around 10-11  -managed by nephrology; on aranesp  -Hgb decreased as above     Sensorineural hearing loss  Mild-moderate per audiology testing/14/2022.  He may be a hearing aid candidate in the future     Unsteady Gait  Ongoing fatigue due to multiple comorbidities as above  - uses a walker, 2 wheeled-2 constipation.  Does not want a rollator walker at this time  - He is a fall risk with his unsteady gait, fatigue.     Anxiety  Lexapro 5 mg once a day.          VTE  PPX: LEONELs - Eliquis on hold          6/3 EGD/Colonoscopy Operative Report   Pre-Operative Diagnosis: anemia, rectal bleeding     Post-Operative Diagnosis:    Normal esophagus except for small hiatal hernia.     Minimal gastritis, otherwise normal gastric examination.    Normal duodenum   There was a small hepatic flexure vs proximal transverse colon bleeding lesion (1mm in size)- AVM vs dieulafoy's lesion that was actively bleeding.  This was cauterized using APC.  This site was clipped X 1.     There was an ascending colon sessile polyp, 8mm, fully removed with hot cautery snare.     No other colon abnormalities.    Normal terminal ileum   A couple of small mouth left diverticuli.    Small internal hemorrhoids        Procedure Performed:   EGD   COLONOSCOPY with APC and clip and hot snare  Informed Consent: Informed consent for both the procedure and sedation were obtained from the patient. The potentially life-threatening complications of sedation, bleeding,  perforation, transfusion or repeat endoscopy were reviewed along with the possible need for hospitalization, surgical management, transfusion or repeat endoscopy should one of these complications arise. The patient understands and is agreeable to proceed.     Findings:                ESOPHAGUS:  Normal esophagus.  The Z-line and GE junction noted at 38cms from the incisors and was normal. Small 1cm sliding hiatal hernia noted.                STOMACH:   Minimal gastric erythema suggestive of minimal gastritis, however no other abnormalities. Retroflexed view was unremarkable.                 DUODENUM:  normal visualized duodenum to the 3rd portion               COLON:    1.           There was a small hepatic flexure vs proximal transverse colon bleeding lesion (1mm in size)- AVM vs dieulafoy's lesion that was actively bleeding.  This was cauterized using APC.  This site was clipped X 1.    2.            There was an ascending colon sessile polyp, 8mm, fully  removed with hot cautery snare.    3.            No other colon abnormalities.   4.            Normal terminal ileum  5.            A couple of small mouth left diverticuli.   6.            Small internal hemorrhoids        Recommendations:    Follow up pathology results   Hold plavix X 2 days.  Can restart in two days if no signs of bleeding.    Fiber supplementation such as benefiber or citrucel daily.          6/3 Nephrology  Assessment  #1 ESRD proceed with dialysis     Post upper and lower endoscopy     #2GI bleeding normal esophagus mild gastritis had some small bleeding in the proximal transverse colon that was cauterized  Is on a PPI hemoglobin stable at 10.5  #3 hypertension BP mildly high see how it does after dialysis        6/4 IM       SUBJECTIVE:  Status post EGD and colonoscopy, tolerated procedure.  Did have dialysis.  Seen at bedside with wife currently.  Feels little bit tired today, had dialysis late last night.  No new pain anywhere.  Did have some small bowel movements that were nonbloody, nonmelanotic.  Drinking plenty of liquids.       Assessment & Plan     Rectal bleeding   Acute on chronic anemia  -pt with hx of constipation/hemorrhoids/rectal bleeding in the past (saw GI Dr. Gautam for this in 10/2020)  -last colonoscopy 1/25/21 (Dr. Gautam) -- internal hemorrhoids, colon polyps x 2 (one tubular adenoma)  -eliquis on hold  -suspect internal hemorrhoidal bleed; no external hemorrhoids on exam  -empiric anusol HC suppository BID  -pt had a small BM this am without blood  -Hgb 9.6 today, likely equilibrating as no active bleeding today.  -GI consult appreciated; post EGD and colonoscopy with Dr. Saldana 6/3, small hepatic flexure versus proximal transverse colon lesion 1 mm/AVMs due for lesion status post cautery  -Need to hold Plavix for 2 days per GI, resume if no signs of bleeding.  Ongoing fiber supplementation     CAD  -[facility] 5/21/2024 by Dr. Luis Orozco: 70% lesion of apical LAD, 80% lesion  of LCx  -S/p PCI of LCx (medical management of the LAD lesion)  -s/p aspirin, plavix and eliquis x 1 week, now just plavix and eliquis  -cardiology consult appreciated; plavix restarted     Pharyngeal dysphagia  --right side hypomobile oropharyngeal dysphagia  - swallow study 4/2024: intermittent shallow and deep laryngeal penetration without aspiration, small zenker's diverticulum, bulky endplate osteophytes contributing to dysphagia  - to see Dr. Del Angel (ENT at [place]) for further imaging     Paroxysmal A-fib  --dx'ed 9/2023  --Zio monitor 10/2023 -- NSVT (normal EF 65%); on carvedilol  - followed by EP Dr. Phelan  --eliquis on hold for rectal bleeding as above     Cervical radiculopathy  Chronic back pain with neuropathy  -CT scan of the cervical neck 7/2019 ; history of cervical decompressive laminectomy with multilevel degenerative disc disease  - s/p physical therapy in the past  --MRI brain and MRI cervical spine done 12/29/22 (MRI brain w/mod chronic microvascular ischemic changes bilat cerebral hemispheres, basal ganglia and thalami; MRI cervical spine w/multilevel spinal stenosis)  --saw [facility] Dr. Araujo in 1/2023; had subsequent MRI thoracic/lumbar spine.  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  Pt was referred for PT in 2/2023.      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 Sevilla  --HgbA1c 5.5 in 9/2023  - Gabapentin for neuropathy  --at home: on Tradjenta 5mg daily (no longer on insulin)  -- SSI in hospital  --BS 140s     ESRD on hemodialysis  - 2/2 prolonged history of diabetes and hypertension  - Follows with Dr. Martinez  -Last HD overnight, 1 L removed     Hypertension  - carvedilol 25 mg BID, hydralazine 25mg q8h, felodipine 10mg/day (on amlodipine in hosp due to formulary)      Hyperlipidemia  - Continue with On atorvastatin 40 mg daily, aspirin     BPH  -no longer takes trospium or tamsulosin     Pulmonary hypertension  -Comanagement of KRAIG and chronic diastolic heart failure  - stable     KRAIG on CPAP  - sees pulm Dr. Landeros     Cataracts  -Seems to be stable, follows with Dr. Chase of ophthalmology     Gout  Flareup in 6/2022. NO recurrence  -Seems to be stable     Anemia of ESRD  -Baseline hemoglobin seems to be around 10-11  -managed by nephrology; on aranesp  -Hgb decreased as above     Sensorineural hearing loss  Mild-moderate per audiology testing/14/2022.  He may be a hearing aid candidate in the future     Unsteady Gait  Ongoing fatigue due to multiple comorbidities as above  - uses a walker, 2 wheeled-2 constipation.  Does not want a rollator walker at this time  - He is a fall risk with his unsteady gait, fatigue.     Anxiety  Lexapro 5 mg once a day.          VTE PPX: SCDs - Eliquis on hold       Latest Reference Range & Units 06/01/24 02:11 06/01/24 03:20 06/01/24 16:53 06/02/24 05:57 06/03/24 05:39 06/04/24 07:18   Glucose 70 - 99 mg/dL 164 (H)   124 (H) 124 (H) 102 (H)   Sodium 136 - 145 mmol/L 138   138 134 (L) 136   Potassium 3.5 - 5.1 mmol/L 3.7   3.9 3.8 3.9   Chloride 98 - 112 mmol/L 100   100 98 100   Carbon Dioxide, Total 21.0 - 32.0 mmol/L 32.0   30.0 27.0 27.0   BUN 9 - 23 mg/dL 29 (H)   38 (H) 40 (H) 20   CREATININE 0.70 - 1.30 mg/dL 4.17 (H)   5.36 (H) 5.86 (H) 4.18 (H)   CALCIUM 8.7 - 10.4 mg/dL 9.6   10.1 9.5 9.4   BUN/CREATININE RATIO 10.0 - 20.0  7.0 (L)   7.1 (L) 6.8 (L) 4.8 (L)   EGFR >=60 mL/min/1.73m2 14 (L)   10 (L) 9 (L) 14 (L)   ANION GAP 0 - 18 mmol/L 6   8 9 9   CALCULATED OSMOLALITY 275 - 295 mOsm/kg 295   296 (H) 289 285   ALKALINE PHOSPHATASE 45 - 117 U/L 64        AST (SGOT) <=34 U/L 25        ALT (SGPT) 10 - 49 U/L 26        Total Bilirubin 0.2 - 1.1 mg/dL 0.4        Globulin 2.0 - 3.5 g/dL 3.0        PHOSPHORUS 2.4 - 5.1 mg/dL 3.2    5.0 5.0    A/G Ratio 1.0 - 2.0  1.2        PROTEIN, TOTAL 5.7 - 8.2 g/dL 6.7        Albumin 3.2 - 4.8 g/dL 3.7    3.7 3.6   TYPE AND SCREEN     Rpt     ABO BLOOD TYPE     A     RH Factor     Positive     ANTIBODY SCREEN     Negative     WBC 4.0 - 11.0 x10(3) uL 5.7   5.8 5.3 11.4 (H)   Hemoglobin 13.0 - 17.5 g/dL 10.1 (L) 9.8 (L) 11.6 (L) 10.5 (L) 10.5 (L) 9.6 (L)   Hematocrit 39.0 - 53.0 % 32.7 (L) 31.5 (L) 37.6 (L) 33.4 (L) 32.1 (L) 29.7 (L)   Platelet Count 150.0 - 450.0 10(3)uL 220.0   233.0 208.0 216.0   RBC 3.80 - 5.80 x10(6)uL 3.39 (L)   3.52 (L) 3.39 (L) 3.17 (L)   (H): Data is abnormally high  (L): Data is abnormally low  Rpt: View report in Results Review for more information             Medications 05/29/24 05/30/24 05/31/24 06/01/24 06/02/24 06/03/24 06/04/24   acetaminophen (Tylenol Extra Strength) tab 1,000 mg  Dose: 1,000 mg  Freq: Once Route: OR  Start: 06/01/24 0344 End: 06/01/24 0345       0345 AC-Given           hydrALAZINE (Apresoline) tab 25 mg  Dose: 25 mg  Freq: Every 8 hours scheduled Route: OR  Start: 06/01/24 0630       0700 RB-Given     1559 AB-Given     2014 RP-Given      0559 RP-Given     1426 AB-Given     2022 RP-Given      0456 RP-Given     (1400 MS)-Not Given     2215 RS-Given      0539 RS-Given     1400     2200        hydrocortisone (Anusol-HC) 25 MG rectal suppository 25 mg  Dose: 25 mg  Freq: 2 times daily Route: RE  Start: 06/01/24 1130       1341 AB-Given     2019 RP-Given      1211 AB-Given     (2100 RP)-Not Given [C]      (0900 MS)-Not Given [C]     (2100 RS)-Not Given      (0900 GM)-Not Given     2100        pantoprazole (Protonix) DR tab 40 mg  Dose: 40 mg  Freq: Every morning before breakfast Route: OR  Start: 06/01/24 0700   Admin Instructions:   Do not crush       0700 RB-Given      0559 RP-Given      0500 RP-Given      0539 RS-Given        polyethylene glycol-electrolyte (Golytely) 236 g oral solution 2,000 mL  Dose: 2,000 mL  Freq: Once Route: OR  Start: 06/02/24 1800 End:  06/02/24 1800   Admin Instructions:   Please give over 2 hours        1800 AB-Given          polyethylene glycol-electrolyte (Golytely) 236 g oral solution 2,000 mL  Dose: 2,000 mL  Freq: Once Route: OR  Start: 06/03/24 0500 End: 06/03/24 0456   Admin Instructions:   Please give over 2 hours         0456 RP-Given                      Medications 05/29/24 05/30/24 05/31/24 06/01/24 06/02/24 06/03/24 06/04/24   acetaminophen (Tylenol Extra Strength) tab 500 mg  Dose: 500 mg  Freq: Every 4 hours PRN Route: OR  PRN Reasons: Fever,mild pain,Headaches  PRN Comment: equal to or greater than 100.4  Start: 06/01/24 0617   Admin Instructions:   do not initiate oral therapy until 6-8 hours after the last IV acetaminophen dose if IV acetaminophen was used previously       (0945 AB)-Not Given      1211 AB-Given      2032 RS-Given         traMADol (Ultram) tab 50 mg  Dose: 50 mg  Freq: Every 12 hours PRN Route: OR  PRN Reasons: moderate pain,severe pain  Start: 06/01/24 0615   Admin Instructions:   Max dose 400 mg/day         2215 RS-Given               06/04/24 0826 98.6 °F (37 °C) 68 18 115/47 100 % -- None (Room air) -- GM   06/04/24 0537 98.2 °F (36.8 °C) 74 18 133/57 98 % -- None (Room air) -- RS   06/03/24 2228 -- -- -- -- 97 % -- -- -- JM   06/03/24 2210 -- 77 18 151/60 -- -- -- -- RS   06/03/24 2025 99 °F (37.2 °C) 85 18 152/51 97 % -- None (Room air) -- RS   06/03/24 1900 -- 79 -- -- -- -- -- -- CY   06/03/24 1422 97.8 °F (36.6 °C) 67 18 155/58 100 % -- None (Room air) -- MS   06/03/24 1310 -- 74 19 147/67 100 % -- Nasal cannula 2 L/min SH   06/03/24 1305 -- 84 17 178/76 Abnormal  93 % -- Nasal cannula 2 L/min    06/03/24 1255 -- 71 14 126/61 100 % -- Simple mask 3 L/min    06/03/24 1048 -- 71 15 -- 98 % -- None (Room air) -- SB   06/03/24 0900 97.3 °F (36.3 °C) -- 18 156/62 100 % -- None (Room air) -- MS   06/03/24 0452 97.7 °F (36.5 °C) 75 18 153/66 99 % -- None (Room air) -- RP   06/02/24 2019 97.8 °F (36.6 °C) 67  16 151/62 100 % -- None (Room air) -- RP   06/02/24 1424 98.4 °F (36.9 °C) 65 16 151/60 100 % -- None (Room air) -- AB   06/02/24 0843 98.3 °F (36.8 °C) 68 18 148/52 100 % -- None (Room air) -- AB   06/02/24 0603 -- -- -- -- -- 157 lb 1.6 oz -- -- RP   06/02/24 0546 98.2 °F (36.8 °C) 73 18 178/76 Abnormal  98 % -- None (Room air) -- RP   06/01/24 2138 -- -- -- -- 99 % -- CPAP -- CD   06/01/24 2012 98.2 °F (36.8 °C) 68 18 155/52 98 % -- None (Room air) -- RP   06/01/24 1556 -- 72 17 170/64 Abnormal  100 % -- None (Room air) -- AB   06/01/24 0938 97.9 °F (36.6 °C) 67 18 159/60 100 % -- None (Room air) -- AB   06/01/24 0700 -- 66 -- -- -- -- -- -- RW   06/01/24 0623 -- -- -- -- -- 157 lb 11.2 oz -- -- RB   06/01/24 0613 98.4 °F (36.9 °C) 68 18 156/64 99 % -- None (Room air) -- IB   06/01/24 0345 -- 68 -- 142/58 98 % -- -- -- AC   06/01/24 0302 -- -- -- 146/57 -- -- -- -- AC   06/01/24 0252 -- 72 -- -- -- -- -- -- AC   06/01/24 0245 -- 67 -- 140/53 98 % -- -- -- AC   06/01/24 0202 98.4 °F (36.9 °C) 74 20 148/58 99 % 155 lb 12.8 oz None (Room air) -- LN

## 2024-06-04 NOTE — DISCHARGE INSTRUCTIONS
Here in the hospital due to rectal bleeding that was persistent.  Evaluated by gastroenterology and underwent endoscopy 6/20 which showed small area of bleeding that required intervention with gastroenterology.  We carefully monitored your blood counts in setting of Plavix and Eliquis  - Cardiology following in the hospitalization for which we need to resume Plavix and Eliquis to protect the stent from earlier this year  - Monitor for any further bleeding episodes    You were maintained on hemodialysis schedule while in the hospital    There was a slight drop in your hemoglobin this morning, ideally we would have checked another blood test before discharge.  But this can be checked again in the next few days.  Be sure to monitor for any bleeding episodes, shortness of breath, chest pain that is worsening and return to the emergency department if needed    Follow-up in primary care clinic in 1-2 weeks

## 2024-06-04 NOTE — PROGRESS NOTES
[facility]  Gastroenterology Progress Note    Ollie Hernández Patient Status:  Inpatient    1945 MRN [id number]   Location [facility] 5SW/SE Attending Wili Parmar MD   Hosp Day # 1 PCP Wili Parmar MD     Subjective:  Ollie Hernández is a(n) 79 year old male.    Current complaints:   Feels well.  No evidence of overt bleeding.  No abdominal pain.    Objective:  Blood pressure 115/47, pulse 68, temperature 98.6 °F (37 °C), temperature source Oral, resp. rate 18, height 5' 3\" (1.6 m), weight 157 lb 1.6 oz (71.3 kg), SpO2 100%.  Respiratory: no labored breathing  CV: RRR  Abdomen: nondistended, soft, nontender  Extremities: no calf tenderness  Neurologic: Ox3    Labs:   Recent Labs   Lab 24  0211 24  0320 24  0557 24  0539 24  0718   WBC 5.7  --  5.8 5.3 11.4*   HGB 10.1*   < > 10.5* 10.5* 9.6*   HCT 32.7*   < > 33.4* 32.1* 29.7*   .0  --  233.0 208.0 216.0   CREATSERUM 4.17*  --  5.36* 5.86* 4.18*   BUN 29*  --  38* 40* 20     --  138 134* 136   K 3.7  --  3.9 3.8 3.9     --  100 98 100   CO2 32.0  --  30.0 27.0 27.0   *  --  124* 124* 102*   CA 9.6  --  10.1 9.5 9.4   ALB 3.7  --   --  3.7 3.6   ALKPHO 64  --   --   --   --    BILT 0.4  --   --   --   --    TP 6.7  --   --   --   --    AST 25  --   --   --   --    ALT 26  --   --   --   --    PHOS 3.2  --   --  5.0 5.0    < > = values in this interval not displayed.       No results for input(s): \"ANNE\", \"LIP\", \"GGT\", \"PSA\", \"DDIMER\", \"ESRML\", \"ESRPF\", \"CRP\", \"BNP\", \"TROP\", \"CK\", \"CKMB\", \"EVELYN\", \"RPR\", \"B12\", \"ETOH\", \"POCGLU\" in the last 168 hours.    Invalid input(s): \"RF\"     Imaging:  No results found.     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  EGD without significant abnormalities.  No source of bleeding.  Colonoscopy with bleeding lesion in the proximal transverse/hepatic flexure region possible AVM versus dieulafoy's lesion status post APC and clip with hemostasis achieved.  -No obvious signs of bleeding.  Hemoglobin fluctuating      Recs:  Advance diet as tolerated  Okay to restart anticoagulation with Plavix if necessary given his cardiovascular risk.    Monitor for signs of bleeding    Gabriel Saldana MD    6/4/2024  2:23 PM

## 2024-06-04 NOTE — PROGRESS NOTES
[facility] Cardiology  Consultation Note      Ollie Hernández Patient Status:  Observation    1945 MRN [id number]   Location [facility] 5SW/SE Attending Wili Parmar MD   Hosp Day # 1 PCP Wili Parmar MD     Reason for consult: GIB, recent PCI    Subjective: BM this morning, no blood. No CP or SOB. Hgb is somewhat variable but looking back through his records, most recent baseline is 10-11. Restarted on DAPT.    Impression:  79 year old male admitted with rectal bleeding    ABLA Hgb 11.5 (24) -> 9.8   - Reviewed records: Hgb appears somewhat variable but baseline appears to be 10-11.  - Underwent colonoscopy s/p APC of bleeding lesion and removal of sessile polyp.  CAD - PCI LCX with [place] [place] ABIMBOLA x 1 (24)  PAF on Eliquis  NSVT on Zio patch  Chronic HFpEF - compensated  ESRD on HD MWF  DM2  HTN  KRAIG on CPAP  LVEF 60% (echo )    Recommendations:  Risk of life threatening stent thrombosis outweighs risk of life threatening hemorrhage at present. Patient without any further melanic or bloody stools.  Will trial dual anti-thrombotic therapy with Plavix + Eliquis once more. If he has more bleeding, then would favor DAPT (ASA + plavix) for 1 month and then switch to ASA + Eliquis for a total duration of therapy for 6 months.  Follow Hgb closely and transfuse for Hgb < 8 given CAD.   Currently hypertensive, continue coreg, norvasc and hydralazine cautiously.     Primary cardiologist: Herman Phelan MD     History of Present Illness:  Ollie Hernández is a 79 year old male with CAD and PCI to LCX (24, abnormal stress test, obtained due to recurrent NSVT on Zio patch), PAF on Eliquis, chronic HFpEF, ESRD on HD, was on ASA, plavix, Eliquis x 1 week post PCI then stopped ASA - who presented to [facility] on 2024 with rectal bleeding x 3 days.  Hgb 11.5 -> 9.8. Last blood in stool this morning. Denies CP, SOB.     Medications:  Current Facility-Administered  Medications   Medication Dose Route Frequency    aspirin DR tab 81 mg  81 mg Oral Daily    sodium chloride 0.9 % IV bolus 100 mL  100 mL Intravenous Q30 Min PRN    And    albumin human (Albumin) 25% injection 25 g  25 g Intravenous PRN Dialysis    albuterol (Ventolin HFA) 108 (90 Base) MCG/ACT inhaler 2 puff  2 puff Inhalation Q4H PRN    atorvastatin (Lipitor) tab 40 mg  40 mg Oral Nightly    fluticasone furoate-vilanterol (Breo Ellipta) 200-25 MCG/ACT inhaler 1 puff  1 puff Inhalation Daily    carvedilol (Coreg) tab 25 mg  25 mg Oral BID    cetirizine (ZyrTEC) tab 5 mg  5 mg Oral QOD    cholecalciferol (Vitamin D3) tab 5,000 Units  5,000 Units Oral BID    escitalopram (Lexapro) tab 5 mg  5 mg Oral Daily    amLODIPine (Norvasc) tab 10 mg  10 mg Oral Daily    fluticasone propionate (Flonase) 50 MCG/ACT nasal suspension 2 spray  2 spray Nasal Daily    hydrALAZINE (Apresoline) tab 25 mg  25 mg Oral Q8H STEPHANIE    methocarbamol (Robaxin) tab 500 mg  500 mg Oral BID PRN    pantoprazole (Protonix) DR tab 40 mg  40 mg Oral QAM AC    traMADol (Ultram) tab 50 mg  50 mg Oral Q12H PRN    tetrahydrozoline (Visine) 0.05 % ophthalmic solution 1 drop  1 drop Both Eyes BID PRN    glucose (Dex4) 15 GM/59ML oral liquid 15 g  15 g Oral Q15 Min PRN    Or    glucose (Glutose) 40% oral gel 15 g  15 g Oral Q15 Min PRN    Or    glucose-vitamin C (Dex-4) chewable tab 4 tablet  4 tablet Oral Q15 Min PRN    Or    dextrose 50% injection 50 mL  50 mL Intravenous Q15 Min PRN    Or    glucose (Dex4) 15 GM/59ML oral liquid 30 g  30 g Oral Q15 Min PRN    Or    glucose (Glutose) 40% oral gel 30 g  30 g Oral Q15 Min PRN    Or    glucose-vitamin C (Dex-4) chewable tab 8 tablet  8 tablet Oral Q15 Min PRN    acetaminophen (Tylenol Extra Strength) tab 500 mg  500 mg Oral Q4H PRN    melatonin tab 3 mg  3 mg Oral Nightly PRN    ondansetron (Zofran) 4 MG/2ML injection 4 mg  4 mg Intravenous Q6H PRN    metoclopramide (Reglan) 5 mg/mL injection 5 mg  5 mg  Intravenous Q8H PRN    insulin aspart (NovoLOG) 100 Units/mL FlexPen 1-5 Units  1-5 Units Subcutaneous TID CC    hydrocortisone (Anusol-HC) 25 MG rectal suppository 25 mg  25 mg Rectal BID    clopidogrel (Plavix) tab 75 mg  75 mg Oral Daily       Past Medical History:    Anemia    Asthma (HCC)    Back problem    BPH (benign prostatic hyperplasia)    Calculus of kidney    Cataract    Diabetes (HCC)    ESRD (end stage renal disease) on dialysis (HCC)    Essential hypertension    High blood pressure    High cholesterol    History of blood transfusion    Hyperlipidemia    Neuropathy    hands and feet    KRAIG on CPAP    Renal disorder    Sleep apnea    Visual impairment    glasses    Vocal cord paralysis, unilateral partial       Past Surgical History:   Procedure Laterality Date    Appendectomy      1981    Back surgery      Neck/back - 1998    Capsule endoscopy - internal referral      Cataract      12/2021 and 1/2022    Colonoscopy      Colonoscopy N/A 1/25/2021    Procedure: COLONOSCOPY;  Surgeon: Michael Gautam MD;  Location: [facility] ENDO    Colonoscopy N/A 6/3/2024    Procedure: COLONOSCOPY;  Surgeon: Gabriel Saldana MD;  Location: [facility] ENDOSCOPY    Hand/finger surgery unlisted      Accidental trauma    Upper gi endoscopy,diagnosis         Family History  family history includes Breast Cancer in his mother; Cancer in his father; Diabetes in his maternal grandfather, maternal grandmother, and mother; Heart Disorder in an other family member.    Social History   reports that he quit smoking about 43 years ago. His smoking use included cigarettes. He started smoking about 60 years ago. He has a 17 pack-year smoking history. He has never used smokeless tobacco. He reports that he does not drink alcohol and does not use drugs.     Allergies  Allergies   Allergen Reactions    Adhesive Tape OTHER (SEE COMMENTS)     Severe rashes    Dust Mite Extract RASH       Review of Systems:  As per HPI, otherwise 10 point ROS is  negative in detail.    Physical Exam:  Blood pressure 115/47, pulse 68, temperature 98.6 °F (37 °C), temperature source Oral, resp. rate 18, height 5' 3\" (1.6 m), weight 157 lb 1.6 oz (71.3 kg), SpO2 100%.  Temp (24hrs), Av.4 °F (36.9 °C), Min:97.8 °F (36.6 °C), Max:99 °F (37.2 °C)    Wt Readings from Last 3 Encounters:   24 157 lb 1.6 oz (71.3 kg)   24 163 lb 9.6 oz (74.2 kg)   24 152 lb 1.6 oz (69 kg)       General: Awake and alert; in no acute distress  HEENT: Extraocular movements are intact; sclerae are anicteric; scalp is atraumatic  Neck: Supple; no JVD; no carotid bruits  Cardiac: Regular rate and regular rhythm; normal S1 and S2, no murmurs, rubs, or gallops are appreciated  Lungs: Clear to auscultation bilaterally; no accessory muscle use is noted, no wheezes, rhonci or rales  Abdomen: Soft, non-distended, non-tender; bowel sounds are normoactive  Extremities: Warm, no edema, clubbing or cyanosis; moves all 4 extremities normally, distal pulses intact and equal  Psychiatric: Normal mood and affect; answers questions appropriately  Dermatologic: No rashes; normal skin turgor    Diagnostic testing:    Labs:   No results found for: \"PT\", \"INR\"     Lab Results   Component Value Date    WBC 11.4 2024    HGB 9.6 2024    HCT 29.7 2024    .0 2024    CREATSERUM 4.18 2024    BUN 20 2024     2024    K 3.9 2024     2024    CO2 27.0 2024     2024    CA 9.4 2024    ALB 3.6 2024    PHOS 5.0 2024    PGLU 97 2024       Cardiac diagnostics:    Cath 24: LM 20%, LAD 70% apex, LCX 80%, RCA small, s/p ABIMBOLA x 1 LCX (2.75 x 15 mm)    Luis Orozco MD  Interventional Cardiology  [place]

## 2024-06-04 NOTE — PLAN OF CARE
Patient had HD today, removing 1L.  Problem: Patient Centered Care  Goal: Patient preferences are identified and integrated in the patient's plan of care  Description: Interventions:  - What would you like us to know as we care for you? Patient has HD MWF.  - Provide timely, complete, and accurate information to patient/family  - Incorporate patient and family knowledge, values, beliefs, and cultural backgrounds into the planning and delivery of care  - Encourage patient/family to participate in care and decision-making at the level they choose  - Honor patient and family perspectives and choices  Outcome: Progressing     Problem: PAIN - ADULT  Goal: Verbalizes/displays adequate comfort level or patient's stated pain goal  Description: INTERVENTIONS:  - Encourage pt to monitor pain and request assistance  - Assess pain using appropriate pain scale  - Administer analgesics based on type and severity of pain and evaluate response  - Implement non-pharmacological measures as appropriate and evaluate response  - Consider cultural and social influences on pain and pain management  - Manage/alleviate anxiety  - Utilize distraction and/or relaxation techniques  - Monitor for opioid side effects  - Notify MD/LIP if interventions unsuccessful or patient reports new pain  - Anticipate increased pain with activity and pre-medicate as appropriate  Outcome: Progressing     Problem: RISK FOR INFECTION - ADULT  Goal: Absence of fever/infection during anticipated neutropenic period  Description: INTERVENTIONS  - Monitor WBC  - Administer growth factors as ordered  - Implement neutropenic guidelines  Outcome: Progressing     Problem: SAFETY ADULT - FALL  Goal: Free from fall injury  Description: INTERVENTIONS:  - Assess pt frequently for physical needs  - Identify cognitive and physical deficits and behaviors that affect risk of falls.  - [place] fall precautions as indicated by assessment.  - Educate pt/family on patient safety  including physical limitations  - Instruct pt to call for assistance with activity based on assessment  - Modify environment to reduce risk of injury  - Provide assistive devices as appropriate  - Consider OT/PT consult to assist with strengthening/mobility  - Encourage toileting schedule  Outcome: Progressing     Problem: DISCHARGE PLANNING  Goal: Discharge to home or other facility with appropriate resources  Description: INTERVENTIONS:  - Identify barriers to discharge w/pt and caregiver  - Include patient/family/discharge partner in discharge planning  - Arrange for needed discharge resources and transportation as appropriate  - Identify discharge learning needs (meds, wound care, etc)  - Arrange for interpreters to assist at discharge as needed  - Consider post-discharge preferences of patient/family/discharge partner  - Complete POLST form as appropriate  - Assess patient's ability to be responsible for managing their own health  - Refer to Case Management Department for coordinating discharge planning if the patient needs post-hospital services based on physician/LIP order or complex needs related to functional status, cognitive ability or social support system  Outcome: Progressing     Problem: HEMATOLOGIC - ADULT  Goal: Free from bleeding injury  Description: (Example usage: patient with low platelets)  INTERVENTIONS:  - Avoid intramuscular injections, enemas and rectal medication administration  - Ensure safe mobilization of patient  - Hold pressure on venipuncture sites to achieve adequate hemostasis  - Assess for signs and symptoms of internal bleeding  - Monitor lab trends  - Patient is to report abnormal signs of bleeding to staff  - Avoid use of toothpicks and dental floss  - Use electric shaver for shaving  - Use soft bristle tooth brush  - Limit straining and forceful nose blowing  Outcome: Progressing

## 2024-06-04 NOTE — PROGRESS NOTES
[facility]  part of [facility]    Progress Note    Ollie Hernández Patient Status:  Observation    1945 MRN [id number]   Location [facility] 5SW/SE Attending Wili Parmar MD   Hosp Day # 1 PCP Wili Parmar MD       SUBJECTIVE:  Status post EGD and colonoscopy, tolerated procedure.  Did have dialysis.  Seen at bedside with wife currently.  Feels little bit tired today, had dialysis late last night.  No new pain anywhere.  Did have some small bowel movements that were nonbloody, nonmelanotic.  Drinking plenty of liquids.    OBJECTIVE:  Vital signs in last 24 hours:  /57 (BP Location: Left arm)   Pulse 74   Temp 98.2 °F (36.8 °C) (Oral)   Resp 18   Ht 5' 3\" (1.6 m)   Wt 157 lb 1.6 oz (71.3 kg)   SpO2 98%   BMI 27.83 kg/m²     Intake/Output:    Intake/Output Summary (Last 24 hours) at 2024 0716  Last data filed at 6/3/2024 2025  Gross per 24 hour   Intake 2360 ml   Output 1000 ml   Net 1360 ml       Wt Readings from Last 3 Encounters:   24 157 lb 1.6 oz (71.3 kg)   24 163 lb 9.6 oz (74.2 kg)   24 152 lb 1.6 oz (69 kg)       EXAM:  GENERAL: well developed, well nourished, in no apparent distress  LUNGS: clear to auscultation  CARDIO: RRR, normal S1S2, without murmur or gallop  GI: soft, NT, ND, NABS  EXTREMITIES: no cyanosis, clubbing or edema    Data Review:     Labs:            Imaging:  No results found.            Meds:   Current Facility-Administered Medications   Medication Dose Route Frequency    sodium chloride 0.9 % IV bolus 100 mL  100 mL Intravenous Q30 Min PRN    And    albumin human (Albumin) 25% injection 25 g  25 g Intravenous PRN Dialysis    albuterol (Ventolin HFA) 108 (90 Base) MCG/ACT inhaler 2 puff  2 puff Inhalation Q4H PRN    atorvastatin (Lipitor) tab 40 mg  40 mg Oral Nightly    fluticasone furoate-vilanterol (Breo Ellipta) 200-25 MCG/ACT inhaler 1 puff  1 puff Inhalation Daily    carvedilol (Coreg) tab 25 mg  25 mg Oral  BID    cetirizine (ZyrTEC) tab 5 mg  5 mg Oral QOD    cholecalciferol (Vitamin D3) tab 5,000 Units  5,000 Units Oral BID    escitalopram (Lexapro) tab 5 mg  5 mg Oral Daily    amLODIPine (Norvasc) tab 10 mg  10 mg Oral Daily    fluticasone propionate (Flonase) 50 MCG/ACT nasal suspension 2 spray  2 spray Nasal Daily    hydrALAZINE (Apresoline) tab 25 mg  25 mg Oral Q8H STEPHANIE    methocarbamol (Robaxin) tab 500 mg  500 mg Oral BID PRN    pantoprazole (Protonix) DR tab 40 mg  40 mg Oral QAM AC    traMADol (Ultram) tab 50 mg  50 mg Oral Q12H PRN    tetrahydrozoline (Visine) 0.05 % ophthalmic solution 1 drop  1 drop Both Eyes BID PRN    glucose (Dex4) 15 GM/59ML oral liquid 15 g  15 g Oral Q15 Min PRN    Or    glucose (Glutose) 40% oral gel 15 g  15 g Oral Q15 Min PRN    Or    glucose-vitamin C (Dex-4) chewable tab 4 tablet  4 tablet Oral Q15 Min PRN    Or    dextrose 50% injection 50 mL  50 mL Intravenous Q15 Min PRN    Or    glucose (Dex4) 15 GM/59ML oral liquid 30 g  30 g Oral Q15 Min PRN    Or    glucose (Glutose) 40% oral gel 30 g  30 g Oral Q15 Min PRN    Or    glucose-vitamin C (Dex-4) chewable tab 8 tablet  8 tablet Oral Q15 Min PRN    acetaminophen (Tylenol Extra Strength) tab 500 mg  500 mg Oral Q4H PRN    melatonin tab 3 mg  3 mg Oral Nightly PRN    ondansetron (Zofran) 4 MG/2ML injection 4 mg  4 mg Intravenous Q6H PRN    metoclopramide (Reglan) 5 mg/mL injection 5 mg  5 mg Intravenous Q8H PRN    insulin aspart (NovoLOG) 100 Units/mL FlexPen 1-5 Units  1-5 Units Subcutaneous TID CC    hydrocortisone (Anusol-HC) 25 MG rectal suppository 25 mg  25 mg Rectal BID    clopidogrel (Plavix) tab 75 mg  75 mg Oral Daily    aspirin DR tab 81 mg  81 mg Oral Daily       Assessment & Plan    Rectal bleeding   Acute on chronic anemia  -pt with hx of constipation/hemorrhoids/rectal bleeding in the past (saw GI Dr. Gautam for this in 10/2020)  -last colonoscopy 1/25/21 (Dr. Gautam) -- internal hemorrhoids, colon polyps x 2 (one  tubular adenoma)  -eliquis on hold  -suspect internal hemorrhoidal bleed; no external hemorrhoids on exam  -empiric anusol HC suppository BID  -pt had a small BM this am without blood  -Hgb 9.6 today, likely equilibrating as no active bleeding today.  -GI consult appreciated; post EGD and colonoscopy with Dr. Saldana 6/3, small hepatic flexure versus proximal transverse colon lesion 1 mm/AVMs due for lesion status post cautery  -Need to hold Plavix for 2 days per GI, resume if no signs of bleeding.  Ongoing fiber supplementation     CAD  -[facility] 5/21/2024 by Dr. Luis Orozco: 70% lesion of apical LAD, 80% lesion of LCx  -S/p PCI of LCx (medical management of the LAD lesion)  -s/p aspirin, plavix and eliquis x 1 week, now just plavix and eliquis  -cardiology consult appreciated; plavix restarted     Pharyngeal dysphagia  --right side hypomobile oropharyngeal dysphagia  - swallow study 4/2024: intermittent shallow and deep laryngeal penetration without aspiration, small zenker's diverticulum, bulky endplate osteophytes contributing to dysphagia  - to see Dr. Del Angel (ENT at [place]) for further imaging     Paroxysmal A-fib  --dx'ed 9/2023  --Zio monitor 10/2023 -- NSVT (normal EF 65%); on carvedilol  - followed by EP Dr. Phelan  --eliquis on hold for rectal bleeding as above     Cervical radiculopathy  Chronic back pain with neuropathy  -CT scan of the cervical neck 7/2019 ; history of cervical decompressive laminectomy with multilevel degenerative disc disease  - s/p physical therapy in the past  --MRI brain and MRI cervical spine done 12/29/22 (MRI brain w/mod chronic microvascular ischemic changes bilat cerebral hemispheres, basal ganglia and thalami; MRI cervical spine w/multilevel spinal stenosis)  --saw NS PA Dr. Araujo in 1/2023; had subsequent MRI thoracic/lumbar spine.  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  Pt was referred for PT in 2/2023.      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 Sevilla  --HgbA1c 5.5 in 9/2023  - Gabapentin for neuropathy  --at home: on Tradjenta 5mg daily (no longer on insulin)  -- SSI in hospital  --BS 140s     ESRD on hemodialysis  - 2/2 prolonged history of diabetes and hypertension  - Follows with Dr. Martinez  -Last HD overnight, 1 L removed     Hypertension  - carvedilol 25 mg BID, hydralazine 25mg q8h, felodipine 10mg/day (on amlodipine in hosp due to formulary)     Hyperlipidemia  - Continue with On atorvastatin 40 mg daily, aspirin     BPH  -no longer takes trospium or tamsulosin     Pulmonary hypertension  -Comanagement of KRAIG and chronic diastolic heart failure  - stable     KRAIG on CPAP  - sees pulm Dr. Landeros     Cataracts  -Seems to be stable, follows with Dr. Chase of ophthalmology     Gout  [place] in 6/2022. NO recurrence  -Seems to be stable     Anemia of ESRD  -Baseline hemoglobin seems to be around 10-11  -managed by nephrology; on aranesp  -Hgb decreased as above     Sensorineural hearing loss  Mild-moderate per audiology testing/14/2022.  He may be a hearing aid candidate in the future     Unsteady Gait  Ongoing fatigue due to multiple comorbidities as above  - uses a walker, 2 wheeled-2 constipation.  Does not want a rollator walker at this time  - He is a fall risk with his unsteady gait, fatigue.     Anxiety  Lexapro 5 mg once a day.         VTE PPX: SCDs - Eliquis on hold    Wili Parmar MD, 06/04/24, 8:23 AM

## 2024-06-04 NOTE — PROGRESS NOTES
[facility]  part of [facility]    Progress Note    Ollie Hernández Patient Status:  Inpatient    1945 MRN [id number]   Location [facility] 5SW/SE Attending Wili Parmar MD   Hosp Day # 1 PCP Wili Parmar MD       Subjective:   Ollie Hernández is a(n) 79 year old male     ROS:     Constitutional:  Negative for decreased activity, fever, irritability and lethargy  ENMT:  Negative for ear drainage, hearing loss and nasal drainage  Eyes:  Negative for eye discharge and vision loss  Cardiovascular:  Negative for chest pain, sob, irregular heartbeat/palpitations  Respiratory:  Negative for cough, dyspnea and wheezing  Gastrointestinal:  Negative for abdominal pain, constipation, decreased appetite, diarrhea and vomiting denies blood in stool  Genitourinary:  Negative for dysuria and hematuria  Endocrine:  Negative for abnormal sleep patterns, increased activity, polydipsia and polyphagia  Hema/Lymph:  Negative for easy bleeding and easy bruising  Integumentary:  Negative for pruritus and rash  Musculoskeletal:  Negative for bone/joint symptoms  Neurological:  Negative for gait disturbance  Psychiatric:  Negative for inappropriate interaction and psychiatric symptoms      Vitals:    24 1434   BP: 115/48   Pulse: 69   Resp: 18   Temp: 98.5 °F (36.9 °C)           PHYSICAL EXAM:   Constitutional: appears well hydrated alert and responsive no acute distress noted  Head/Face: normocephalic  Eyes/Vision: normal extraocular motion is intact  Nose/Mouth/Throat:mucous membranes are moist and no oral lesions are noted  Neck/Thyroid: neck is supple without adenopathy  Lymphatic: no abnormal cervical, supraclavicular adenopathy is noted  Respiratory:  lungs are clear to auscultation bilaterally, normal respiratory effort  Cardiovascular: regular rate and rhythm no murmurs, gallups, or rubs  Abdomen: soft, non-tender, non-distended, BS normal  Vascular: well perfused femoral, and pedal  pulses normal  Skin/Hair: no unusual rashes present, no abnormal bruising noted  Back/Spine: no abnormalities noted  Musculoskeletal: full ROM all extremities good strength  no deformities  Extremities: no edema, cyanosis  Neurological:  Grossly normal    Results:     Laboratory Data:  Lab Results   Component Value Date    WBC 11.4 (H) 06/04/2024    HGB 9.6 (L) 06/04/2024    HCT 29.7 (L) 06/04/2024    .0 06/04/2024    CREATSERUM 4.18 (H) 06/04/2024    BUN 20 06/04/2024     06/04/2024    K 3.9 06/04/2024     06/04/2024    CO2 27.0 06/04/2024     (H) 06/04/2024    CA 9.4 06/04/2024    ALB 3.6 06/04/2024    ALKPHO 64 06/01/2024    BILT 0.4 06/01/2024    TP 6.7 06/01/2024    AST 25 06/01/2024    ALT 26 06/01/2024    T4F 0.9 08/31/2022    TSH 2.060 06/23/2023     (H) 07/25/2023    PSA 2.94 10/20/2021    ESRML 79 (H) 03/16/2016    CRP 0.36 03/16/2016    MG 2.1 11/20/2023    PHOS 5.0 06/04/2024    TROP <0.045 07/25/2019     08/05/2023    B12 631 08/05/2023       Imaging:  [unfilled]   No results found.      ASSESSMENT/PLAN:   Assessment       1 ESRD plan dialysis tomorrow    #2 GI bleed had cautery of intestinal lesion    Hemoglobin did drop about 1 g today so need to follow-up   Check iron studies hopefully home soon      6/4/2024  José Callahan MD

## 2024-06-05 PROBLEM — I48.91 ATRIAL FIBRILLATION (HCC): Status: ACTIVE | Noted: 2024-06-05

## 2024-06-05 PROBLEM — K55.20 AVM (ARTERIOVENOUS MALFORMATION) OF COLON: Status: ACTIVE | Noted: 2024-01-01

## 2024-06-05 PROBLEM — D50.0 ANEMIA, BLOOD LOSS: Status: ACTIVE | Noted: 2024-06-05

## 2024-06-05 PROBLEM — D50.0 ANEMIA, BLOOD LOSS: Status: ACTIVE | Noted: 2024-01-01

## 2024-06-05 PROBLEM — K55.20 AVM (ARTERIOVENOUS MALFORMATION) OF COLON: Status: ACTIVE | Noted: 2024-06-05

## 2024-06-05 PROBLEM — I48.91 ATRIAL FIBRILLATION (HCC): Status: ACTIVE | Noted: 2024-01-01

## 2024-06-05 LAB
ALBUMIN SERPL-MCNC: 3.7 G/DL (ref 3.2–4.8)
ANION GAP SERPL CALC-SCNC: 10 MMOL/L (ref 0–18)
BASOPHILS # BLD AUTO: 0.03 X10(3) UL (ref 0–0.2)
BASOPHILS NFR BLD AUTO: 0.3 %
BUN BLD-MCNC: 31 MG/DL (ref 9–23)
BUN/CREAT SERPL: 5.4 (ref 10–20)
CALCIUM BLD-MCNC: 9.4 MG/DL (ref 8.7–10.4)
CHLORIDE SERPL-SCNC: 97 MMOL/L (ref 98–112)
CO2 SERPL-SCNC: 27 MMOL/L (ref 21–32)
CREAT BLD-MCNC: 5.71 MG/DL
DEPRECATED RDW RBC AUTO: 60.1 FL (ref 35.1–46.3)
EGFRCR SERPLBLD CKD-EPI 2021: 10 ML/MIN/1.73M2 (ref 60–?)
EOSINOPHIL # BLD AUTO: 0.15 X10(3) UL (ref 0–0.7)
EOSINOPHIL NFR BLD AUTO: 1.6 %
ERYTHROCYTE [DISTWIDTH] IN BLOOD BY AUTOMATED COUNT: 18.3 % (ref 11–15)
GLUCOSE BLD-MCNC: 117 MG/DL (ref 70–99)
GLUCOSE BLDC GLUCOMTR-MCNC: 115 MG/DL (ref 70–99)
GLUCOSE BLDC GLUCOMTR-MCNC: 154 MG/DL (ref 70–99)
GLUCOSE BLDC GLUCOMTR-MCNC: 192 MG/DL (ref 70–99)
GLUCOSE BLDC GLUCOMTR-MCNC: 212 MG/DL (ref 70–99)
HCT VFR BLD AUTO: 29.1 %
HGB BLD-MCNC: 9.6 G/DL
IMM GRANULOCYTES # BLD AUTO: 0.03 X10(3) UL (ref 0–1)
IMM GRANULOCYTES NFR BLD: 0.3 %
IRON SATN MFR SERPL: 16 %
IRON SERPL-MCNC: 27 UG/DL
LYMPHOCYTES # BLD AUTO: 1.52 X10(3) UL (ref 1–4)
LYMPHOCYTES NFR BLD AUTO: 16.5 %
MCH RBC QN AUTO: 30.5 PG (ref 26–34)
MCHC RBC AUTO-ENTMCNC: 33 G/DL (ref 31–37)
MCV RBC AUTO: 92.4 FL
MONOCYTES # BLD AUTO: 0.75 X10(3) UL (ref 0.1–1)
MONOCYTES NFR BLD AUTO: 8.1 %
NEUTROPHILS # BLD AUTO: 6.73 X10 (3) UL (ref 1.5–7.7)
NEUTROPHILS # BLD AUTO: 6.73 X10(3) UL (ref 1.5–7.7)
NEUTROPHILS NFR BLD AUTO: 73.2 %
OSMOLALITY SERPL CALC.SUM OF ELEC: 286 MOSM/KG (ref 275–295)
PHOSPHATE SERPL-MCNC: 5.2 MG/DL (ref 2.4–5.1)
PLATELET # BLD AUTO: 200 10(3)UL (ref 150–450)
POTASSIUM SERPL-SCNC: 3.8 MMOL/L (ref 3.5–5.1)
RBC # BLD AUTO: 3.15 X10(6)UL
SODIUM SERPL-SCNC: 134 MMOL/L (ref 136–145)
TIBC SERPL-MCNC: 168 UG/DL (ref 250–425)
TRANSFERRIN SERPL-MCNC: 113 MG/DL (ref 215–365)
WBC # BLD AUTO: 9.2 X10(3) UL (ref 4–11)

## 2024-06-05 PROCEDURE — 99232 SBSQ HOSP IP/OBS MODERATE 35: CPT | Performed by: INTERNAL MEDICINE

## 2024-06-05 PROCEDURE — 99233 SBSQ HOSP IP/OBS HIGH 50: CPT | Performed by: INTERNAL MEDICINE

## 2024-06-05 NOTE — PROGRESS NOTES
[facility]  Gastroenterology Progress Note    Ollie Hernández Patient Status:  Inpatient    1945 MRN [id number]   Location [facility] 5SW/SE Attending Wili Parmar MD   Hosp Day # 2 PCP Wili Parmar MD     Subjective:  Ollie Hernández is a(n) 79 year old male.    Current complaints:   Feels well.  No bleeding.  No abdominal pain.    Objective:  Blood pressure 153/61, pulse 83, temperature 98.5 °F (36.9 °C), temperature source Oral, resp. rate 18, height 5' 3\" (1.6 m), weight 150 lb 4.8 oz (68.2 kg), SpO2 98%.  Respiratory: no labored breathing  CV: RRR  Abdomen: nondistended, soft, nontender  Extremities: no calf tenderness  Neurologic: Ox3    Labs:   Recent Labs   Lab 24  0211 24  0320 24  0539 24  0718 24  0615   WBC 5.7   < > 5.3 11.4* 9.2   HGB 10.1*   < > 10.5* 9.6* 9.6*   HCT 32.7*   < > 32.1* 29.7* 29.1*   .0   < > 208.0 216.0 200.0   CREATSERUM 4.17*   < > 5.86* 4.18* 5.71*   BUN 29*   < > 40* 20 31*      < > 134* 136 134*   K 3.7   < > 3.8 3.9 3.8      < > 98 100 97*   CO2 32.0   < > 27.0 27.0 27.0   *   < > 124* 102* 117*   CA 9.6   < > 9.5 9.4 9.4   ALB 3.7  --  3.7 3.6 3.7   ALKPHO 64  --   --   --   --    BILT 0.4  --   --   --   --    TP 6.7  --   --   --   --    AST 25  --   --   --   --    ALT 26  --   --   --   --    PHOS 3.2  --  5.0 5.0 5.2*    < > = values in this interval not displayed.       No results for input(s): \"ANNE\", \"LIP\", \"GGT\", \"PSA\", \"DDIMER\", \"ESRML\", \"ESRPF\", \"CRP\", \"BNP\", \"TROP\", \"CK\", \"CKMB\", \"EVELYN\", \"RPR\", \"B12\", \"ETOH\", \"POCGLU\" in the last 168 hours.    Invalid input(s): \"RF\"     Imaging:  No results found.     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  EGD without significant abnormalities.  No source of bleeding.  Colonoscopy with bleeding lesion in the proximal transverse/hepatic flexure region possible AVM versus dieulafoy's lesion status post APC and clip with hemostasis achieved.  -No obvious signs of bleeding.  Hemoglobin fluctuating but stable.  -Restarted on Plavix yesterday.  Still has no signs of bleeding.      Recs:  Advance diet as tolerated  Continue baby aspirin and Plavix given his cardiovascular risk  Monitor for signs of bleeding  Okay to discharge home    Can consider restarting anticoagulation in 3 days if his risk necessitates.    Will sign off for now call with questions    Gabriel Saldana MD

## 2024-06-05 NOTE — PLAN OF CARE
Problem: Patient Centered Care  Goal: Patient preferences are identified and integrated in the patient's plan of care  Description: Interventions:  - What would you like us to know as we care for you? Lives at home with spouse  - Provide timely, complete, and accurate information to patient/family  - Incorporate patient and family knowledge, values, beliefs, and cultural backgrounds into the planning and delivery of care  - Encourage patient/family to participate in care and decision-making at the level they choose  - Honor patient and family perspectives and choices  Outcome: Progressing     Problem: PAIN - ADULT  Goal: Verbalizes/displays adequate comfort level or patient's stated pain goal  Description: INTERVENTIONS:  - Encourage pt to monitor pain and request assistance  - Assess pain using appropriate pain scale  - Administer analgesics based on type and severity of pain and evaluate response  - Implement non-pharmacological measures as appropriate and evaluate response  - Consider cultural and social influences on pain and pain management  - Manage/alleviate anxiety  - Utilize distraction and/or relaxation techniques  - Monitor for opioid side effects  - Notify MD/LIP if interventions unsuccessful or patient reports new pain  - Anticipate increased pain with activity and pre-medicate as appropriate  Outcome: Progressing     Problem: RISK FOR INFECTION - ADULT  Goal: Absence of fever/infection during anticipated neutropenic period  Description: INTERVENTIONS  - Monitor WBC  - Administer growth factors as ordered  - Implement neutropenic guidelines  Outcome: Progressing     Problem: SAFETY ADULT - FALL  Goal: Free from fall injury  Description: INTERVENTIONS:  - Assess pt frequently for physical needs  - Identify cognitive and physical deficits and behaviors that affect risk of falls.  - Floweree fall precautions as indicated by assessment.  - Educate pt/family on patient safety including physical  limitations  - Instruct pt to call for assistance with activity based on assessment  - Modify environment to reduce risk of injury  - Provide assistive devices as appropriate  - Consider OT/PT consult to assist with strengthening/mobility  - Encourage toileting schedule  Outcome: Progressing     Problem: DISCHARGE PLANNING  Goal: Discharge to home or other facility with appropriate resources  Description: INTERVENTIONS:  - Identify barriers to discharge w/pt and caregiver  - Include patient/family/discharge partner in discharge planning  - Arrange for needed discharge resources and transportation as appropriate  - Identify discharge learning needs (meds, wound care, etc)  - Arrange for interpreters to assist at discharge as needed  - Consider post-discharge preferences of patient/family/discharge partner  - Complete POLST form as appropriate  - Assess patient's ability to be responsible for managing their own health  - Refer to Case Management Department for coordinating discharge planning if the patient needs post-hospital services based on physician/LIP order or complex needs related to functional status, cognitive ability or social support system  Outcome: Progressing     Problem: HEMATOLOGIC - ADULT  Goal: Free from bleeding injury  Description: (Example usage: patient with low platelets)  INTERVENTIONS:  - Avoid intramuscular injections, enemas and rectal medication administration  - Ensure safe mobilization of patient  - Hold pressure on venipuncture sites to achieve adequate hemostasis  - Assess for signs and symptoms of internal bleeding  - Monitor lab trends  - Patient is to report abnormal signs of bleeding to staff  - Avoid use of toothpicks and dental floss  - Use electric shaver for shaving  - Use soft bristle tooth brush  - Limit straining and forceful nose blowing  Outcome: Progressing     Problem: METABOLIC/FLUID AND ELECTROLYTES - ADULT  Goal: Glucose maintained within prescribed range  Description:  INTERVENTIONS:  - Monitor Blood Glucose as ordered  - Assess for signs and symptoms of hyperglycemia and hypoglycemia  - Administer ordered medications to maintain glucose within target range  - Assess barriers to adequate nutritional intake and initiate nutrition consult as needed  - Instruct patient on self management of diabetes  Outcome: Progressing  Goal: Electrolytes maintained within normal limits  Description: INTERVENTIONS:  - Monitor labs and rhythm and assess patient for signs and symptoms of electrolyte imbalances  - Administer electrolyte replacement as ordered  - Monitor response to electrolyte replacements, including rhythm and repeat lab results as appropriate  - Fluid restriction as ordered  - Instruct patient on fluid and nutrition restrictions as appropriate  Outcome: Progressing  Goal: Hemodynamic stability and optimal renal function maintained  Description: INTERVENTIONS:  - Monitor labs and assess for signs and symptoms of volume excess or deficit  - Monitor intake, output and patient weight  - Monitor urine specific gravity, serum osmolarity and serum sodium as indicated or ordered  - Monitor response to interventions for patient's volume status, including labs, urine output, blood pressure (other measures as available)  - Encourage oral intake as appropriate  - Instruct patient on fluid and nutrition restrictions as appropriate  Outcome: Progressing       Eliquis restarted per Cardiology- monitoring output. Patient had dialysis today, 1L removed per HD Rn. Tolerating diet, no pain at this time. Per HD RN, patient educated on fluid overload and potassium levels, education reinforced at the bedside. Calls appropriately.

## 2024-06-05 NOTE — PROGRESS NOTES
[facility] Cardiology  Consultation Note      Ollie Hernández Patient Status:  Observation    1945 MRN [id number]   Location [facility] 5SW/SE Attending Wili Parmar MD   Hosp Day # 2 PCP Wili Parmar MD     Reason for consult: GIB, recent PCI    Subjective: BM this morning, no blood. No CP or SOB. Hgb is somewhat variable but looking back through his records, most recent baseline is 10-11. Restarted on DAPT.    Impression:  79 year old male admitted with rectal bleeding    ABLA Hgb 11.5 (24) -> 9.8   - Reviewed records: Hgb appears somewhat variable but baseline appears to be 10-11.  - Underwent colonoscopy s/p APC of bleeding lesion and removal of sessile polyp.  CAD - PCI LCX with [place] ABIMBOLA x 1 (24)  - Restarted Eliquis + plavix on 24  - Hgb stable this am  PAF on Eliquis  NSVT on Zio patch  Chronic HFpEF - compensated  ESRD on HD MWF  DM2  HTN  KRAIG on CPAP  LVEF 60% (echo )    Recommendations:  Risk of life threatening stent thrombosis outweighs risk of life threatening hemorrhage at present. Patient without any further melanic or bloody stools.  Will trial dual anti-thrombotic therapy with Plavix + Eliquis once more. If he has more bleeding, then would favor DAPT (ASA + plavix) for 1 month and then switch to ASA + Eliquis for a total duration of therapy for 6 months.  Follow Hgb closely and transfuse for Hgb < 8 given CAD.   Currently hypertensive, continue coreg, norvasc and hydralazine cautiously.   Okay for DC if Hgb is stable and patient has no bloody stools.    Primary cardiologist: Herman Phelan MD     History of Present Illness:  Ollie Hernández is a 79 year old male with CAD and PCI to LCX (24, abnormal stress test, obtained due to recurrent NSVT on Zio patch), PAF on Eliquis, chronic HFpEF, ESRD on HD, was on ASA, plavix, Eliquis x 1 week post PCI then stopped ASA - who presented to [facility] on 2024 with rectal bleeding x 3 days.   Hgb 11.5 -> 9.8. Last blood in stool this morning. Denies CP, SOB.     Medications:  Current Facility-Administered Medications   Medication Dose Route Frequency    apixaban (Eliquis) tab 5 mg  5 mg Oral BID    heparin (Porcine) 1000 UNIT/ML injection 1,500 Units  1.5 mL Intravenous PRN Dialysis    lidocaine-prilocaine (Emla) 2.5-2.5 % cream   Topical PRN    sodium chloride 0.9 % IV bolus 100 mL  100 mL Intravenous Q30 Min PRN    And    albumin human (Albumin) 25% injection 25 g  25 g Intravenous PRN Dialysis    albuterol (Ventolin HFA) 108 (90 Base) MCG/ACT inhaler 2 puff  2 puff Inhalation Q4H PRN    atorvastatin (Lipitor) tab 40 mg  40 mg Oral Nightly    fluticasone furoate-vilanterol (Breo Ellipta) 200-25 MCG/ACT inhaler 1 puff  1 puff Inhalation Daily    carvedilol (Coreg) tab 25 mg  25 mg Oral BID    cetirizine (ZyrTEC) tab 5 mg  5 mg Oral QOD    cholecalciferol (Vitamin D3) tab 5,000 Units  5,000 Units Oral BID    escitalopram (Lexapro) tab 5 mg  5 mg Oral Daily    amLODIPine (Norvasc) tab 10 mg  10 mg Oral Daily    fluticasone propionate (Flonase) 50 MCG/ACT nasal suspension 2 spray  2 spray Nasal Daily    hydrALAZINE (Apresoline) tab 25 mg  25 mg Oral Q8H STEPHANIE    methocarbamol (Robaxin) tab 500 mg  500 mg Oral BID PRN    pantoprazole (Protonix) DR tab 40 mg  40 mg Oral QAM AC    traMADol (Ultram) tab 50 mg  50 mg Oral Q12H PRN    tetrahydrozoline (Visine) 0.05 % ophthalmic solution 1 drop  1 drop Both Eyes BID PRN    glucose (Dex4) 15 GM/59ML oral liquid 15 g  15 g Oral Q15 Min PRN    Or    glucose (Glutose) 40% oral gel 15 g  15 g Oral Q15 Min PRN    Or    glucose-vitamin C (Dex-4) chewable tab 4 tablet  4 tablet Oral Q15 Min PRN    Or    dextrose 50% injection 50 mL  50 mL Intravenous Q15 Min PRN    Or    glucose (Dex4) 15 GM/59ML oral liquid 30 g  30 g Oral Q15 Min PRN    Or    glucose (Glutose) 40% oral gel 30 g  30 g Oral Q15 Min PRN    Or    glucose-vitamin C (Dex-4) chewable tab 8 tablet  8 tablet  Oral Q15 Min PRN    acetaminophen (Tylenol Extra Strength) tab 500 mg  500 mg Oral Q4H PRN    melatonin tab 3 mg  3 mg Oral Nightly PRN    ondansetron (Zofran) 4 MG/2ML injection 4 mg  4 mg Intravenous Q6H PRN    metoclopramide (Reglan) 5 mg/mL injection 5 mg  5 mg Intravenous Q8H PRN    insulin aspart (NovoLOG) 100 Units/mL FlexPen 1-5 Units  1-5 Units Subcutaneous TID CC    hydrocortisone (Anusol-HC) 25 MG rectal suppository 25 mg  25 mg Rectal BID    clopidogrel (Plavix) tab 75 mg  75 mg Oral Daily       Past Medical History:    Anemia    Asthma (HCC)    Back problem    BPH (benign prostatic hyperplasia)    Calculus of kidney    Cataract    Diabetes (HCC)    ESRD (end stage renal disease) on dialysis (HCC)    Essential hypertension    High blood pressure    High cholesterol    History of blood transfusion    Hyperlipidemia    Neuropathy    hands and feet    KRAIG on CPAP    Renal disorder    Sleep apnea    Visual impairment    glasses    Vocal cord paralysis, unilateral partial       Past Surgical History:   Procedure Laterality Date    Appendectomy      1981    Back surgery      Neck/back - 1998    Capsule endoscopy - internal referral      Cataract      12/2021 and 1/2022    Colonoscopy      Colonoscopy N/A 1/25/2021    Procedure: COLONOSCOPY;  Surgeon: Michael Gautam MD;  Location: [facility] ENDO    Colonoscopy N/A 6/3/2024    Procedure: COLONOSCOPY;  Surgeon: Gabriel Saldana MD;  Location: [facility] ENDOSCOPY    Hand/finger surgery unlisted      Accidental trauma    Upper gi endoscopy,diagnosis         Family History  family history includes Breast Cancer in his mother; Cancer in his father; Diabetes in his maternal grandfather, maternal grandmother, and mother; Heart Disorder in an other family member.    Social History   reports that he quit smoking about 43 years ago. His smoking use included cigarettes. He started smoking about 60 years ago. He has a 17 pack-year smoking history. He has never used smokeless  tobacco. He reports that he does not drink alcohol and does not use drugs.     Allergies  Allergies   Allergen Reactions    Adhesive Tape OTHER (SEE COMMENTS)     Severe rashes    Dust Mite Extract RASH       Review of Systems:  As per HPI, otherwise 10 point ROS is negative in detail.    Physical Exam:  Blood pressure 126/52, pulse 76, temperature 99 °F (37.2 °C), temperature source Oral, resp. rate 18, height 5' 3\" (1.6 m), weight 150 lb 4.8 oz (68.2 kg), SpO2 98%.  Temp (24hrs), Av.9 °F (37.2 °C), Min:98.5 °F (36.9 °C), Max:99.3 °F (37.4 °C)    Wt Readings from Last 3 Encounters:   24 150 lb 4.8 oz (68.2 kg)   24 163 lb 9.6 oz (74.2 kg)   24 152 lb 1.6 oz (69 kg)       General: Awake and alert; in no acute distress  HEENT: Extraocular movements are intact; sclerae are anicteric; scalp is atraumatic  Neck: Supple; no JVD; no carotid bruits  Cardiac: Regular rate and regular rhythm; normal S1 and S2, no murmurs, rubs, or gallops are appreciated  Lungs: Clear to auscultation bilaterally; no accessory muscle use is noted, no wheezes, rhonci or rales  Abdomen: Soft, non-distended, non-tender; bowel sounds are normoactive  Extremities: Warm, no edema, clubbing or cyanosis; moves all 4 extremities normally, distal pulses intact and equal  Psychiatric: Normal mood and affect; answers questions appropriately  Dermatologic: No rashes; normal skin turgor    Diagnostic testing:    Labs:   No results found for: \"PT\", \"INR\"     Lab Results   Component Value Date    WBC 9.2 2024    HGB 9.6 2024    HCT 29.1 2024    .0 2024    CREATSERUM 5.71 2024    BUN 31 2024     2024    K 3.8 2024    CL 97 2024    CO2 27.0 2024     2024    CA 9.4 2024    ALB 3.7 2024    PHOS 5.2 2024    PGLU 145 2024       Cardiac diagnostics:    Cath 24: LM 20%, LAD 70% apex, LCX 80%, RCA small, s/p ABIMBOLA x 1 LCX (2.75 x 15  mm)    Luis Orozco MD  Interventional Cardiology  [place]

## 2024-06-05 NOTE — PROGRESS NOTES
[facility]  part of [facility]    Progress Note    Ollie Hernández Patient Status:  Inpatient    1945 MRN [id number]   Location [facility] 5SW/SE Attending Wili Parmar MD   Hosp Day # 2 PCP Wili Parmar MD         Assessment and Plan:     Rectal bleeding   Acute on chronic anemia  -pt with hx of constipation/hemorrhoids/rectal bleeding in the past (saw GI Dr. Gautam for this in 10/2020)  -last colonoscopy 21 (Dr. Gautam) -- internal hemorrhoids, colon polyps x 2 (one tubular adenoma)  -eliquis on hold  -suspect internal hemorrhoidal bleed; no external hemorrhoids on exam  -empiric anusol HC suppository BID  -pt had a small BM this am without blood  -Hgb 9.6 today, likely equilibrating as no active bleeding today.  -GI consult appreciated; post EGD and colonoscopy with Dr. Saldana 6/3, small hepatic flexure versus proximal transverse colon lesion 1 mm/AVMs due for lesion status post cautery  -have resumed Plavix 75 mg po every day;resuming Eliquis 5 mg po BID today;  Ongoing fiber supplementation  -if no recurrent bleeding, possibly home in AM     CAD  -LHC 2024 by Dr. Luis Orozco: 70% lesion of apical LAD, 80% lesion of LCx  -S/p PCI of LCx (medical management of the LAD lesion)  -s/p aspirin, plavix and eliquis x 1 week, now just plavix and eliquis  -cardiology consult appreciated; plavix restarted     Pharyngeal dysphagia  --right side hypomobile oropharyngeal dysphagia  - swallow study 2024: intermittent shallow and deep laryngeal penetration without aspiration, small zenker's diverticulum, bulky endplate osteophytes contributing to dysphagia  - to see Dr. Del Angel (ENT at [facility]) for further imaging     Paroxysmal A-fib  --dx'ed 2023  --Zio monitor 10/2023 -- NSVT (normal EF 65%); on carvedilol  - followed by EP Dr. Phelan  --Eliquis resumed as above     Cervical radiculopathy  Chronic back pain with neuropathy  -CT scan of the cervical neck 2019 ; history of  cervical decompressive laminectomy with multilevel degenerative disc disease  - s/p physical therapy in the past  --MRI brain and MRI cervical spine done 12/29/22 (MRI brain w/mod chronic microvascular ischemic changes bilat cerebral hemispheres, basal ganglia and thalami; MRI cervical spine w/multilevel spinal stenosis)  --saw VINICIO Araujo in 1/2023; had subsequent MRI thoracic/lumbar spine.  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  Pt was referred for PT in 2/2023.      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 Sevilla  --HgbA1c 5.5 in 9/2023  - Gabapentin for neuropathy  --at home: on Tradjenta 5mg daily (no longer on insulin)  -- SSI in hospital  --BS 140s     ESRD on hemodialysis  - 2/2 prolonged history of diabetes and hypertension  - Follows with Dr. Martinez  -HD today     Hypertension  - carvedilol 25 mg BID, hydralazine 25mg q8h, felodipine 10mg/day (on amlodipine in hosp due to formulary)     Hyperlipidemia  - Continue with On atorvastatin 40 mg daily, aspirin     BPH  -no longer takes trospium or tamsulosin     Pulmonary hypertension  -Comanagement of KRAIG and chronic diastolic heart failure  - stable     KRAIG on CPAP  - sees pulm Dr. Landeros     Cataracts  -Seems to be stable, follows with Dr. Chase of ophthalmology     Gout  Flareup in 6/2022. NO recurrence  -Seems to be stable     Anemia of ESRD  -Baseline hemoglobin seems to be around 10-11  -managed by nephrology; on aranesp  -Hgb decreased as above     Sensorineural hearing loss  Mild-moderate per audiology testing/14/2022.  He may be a hearing aid candidate in the future     Unsteady Gait  Ongoing fatigue due to multiple comorbidities as above  - uses a walker, 2 wheeled-2 constipation.  Does not want a rollator walker at this time  -  He is a fall risk with his unsteady gait, fatigue.     Anxiety  Lexapro 5 mg once a day.          VTE PPX: SCDs - resuming Eliquis as above        SUBJECTIVE:    No melena; no BRBPR        OBJECTIVE:  PHYSICAL EXAM:     Vital signs in last 24 hours:  /50 (BP Location: Left arm)   Pulse 77   Temp 98.4 °F (36.9 °C) (Oral)   Resp 18   Ht 5' 3\" (1.6 m)   Wt 150 lb 4.8 oz (68.2 kg)   SpO2 97%   BMI 26.62 kg/m²     Intake/Output:    Intake/Output Summary (Last 24 hours) at 6/5/2024 1520  Last data filed at 6/5/2024 1141  Gross per 24 hour   Intake 240 ml   Output 1000 ml   Net -760 ml       Wt Readings from Last 3 Encounters:   06/05/24 150 lb 4.8 oz (68.2 kg)   05/28/24 163 lb 9.6 oz (74.2 kg)   05/22/24 152 lb 1.6 oz (69 kg)         Constitutional: alert and oriented x3 in no acute distress  HEENT- EOMI, PERRL  Nose/Mouth/Throat: pharynx without erythema  Neck/Thyroid: neck supple; no thyromegaly  Cardiovascular: RRR, S1, S2, no S3 or murmur  Respiratory: lungs without crackles or wheezes  Abdomen: normoactive bowel sounds, soft, non-tender and non-distended  Extremities: no clubbing, cyanosis or edema          Meds:   Current Facility-Administered Medications   Medication Dose Route Frequency    apixaban (Eliquis) tab 5 mg  5 mg Oral BID    heparin (Porcine) 1000 UNIT/ML injection 1,500 Units  1.5 mL Intravenous PRN Dialysis    lidocaine-prilocaine (Emla) 2.5-2.5 % cream   Topical PRN    sodium chloride 0.9 % IV bolus 100 mL  100 mL Intravenous Q30 Min PRN    And    albumin human (Albumin) 25% injection 25 g  25 g Intravenous PRN Dialysis    albuterol (Ventolin HFA) 108 (90 Base) MCG/ACT inhaler 2 puff  2 puff Inhalation Q4H PRN    atorvastatin (Lipitor) tab 40 mg  40 mg Oral Nightly    fluticasone furoate-vilanterol (Breo Ellipta) 200-25 MCG/ACT inhaler 1 puff  1 puff Inhalation Daily    carvedilol (Coreg) tab 25 mg  25 mg Oral BID    cetirizine (ZyrTEC) tab 5 mg  5 mg Oral QOD    cholecalciferol (Vitamin  D3) tab 5,000 Units  5,000 Units Oral BID    escitalopram (Lexapro) tab 5 mg  5 mg Oral Daily    amLODIPine (Norvasc) tab 10 mg  10 mg Oral Daily    fluticasone propionate (Flonase) 50 MCG/ACT nasal suspension 2 spray  2 spray Nasal Daily    hydrALAZINE (Apresoline) tab 25 mg  25 mg Oral Q8H STEPHANIE    methocarbamol (Robaxin) tab 500 mg  500 mg Oral BID PRN    pantoprazole (Protonix) DR tab 40 mg  40 mg Oral QAM AC    traMADol (Ultram) tab 50 mg  50 mg Oral Q12H PRN    tetrahydrozoline (Visine) 0.05 % ophthalmic solution 1 drop  1 drop Both Eyes BID PRN    glucose (Dex4) 15 GM/59ML oral liquid 15 g  15 g Oral Q15 Min PRN    Or    glucose (Glutose) 40% oral gel 15 g  15 g Oral Q15 Min PRN    Or    glucose-vitamin C (Dex-4) chewable tab 4 tablet  4 tablet Oral Q15 Min PRN    Or    dextrose 50% injection 50 mL  50 mL Intravenous Q15 Min PRN    Or    glucose (Dex4) 15 GM/59ML oral liquid 30 g  30 g Oral Q15 Min PRN    Or    glucose (Glutose) 40% oral gel 30 g  30 g Oral Q15 Min PRN    Or    glucose-vitamin C (Dex-4) chewable tab 8 tablet  8 tablet Oral Q15 Min PRN    acetaminophen (Tylenol Extra Strength) tab 500 mg  500 mg Oral Q4H PRN    melatonin tab 3 mg  3 mg Oral Nightly PRN    ondansetron (Zofran) 4 MG/2ML injection 4 mg  4 mg Intravenous Q6H PRN    metoclopramide (Reglan) 5 mg/mL injection 5 mg  5 mg Intravenous Q8H PRN    insulin aspart (NovoLOG) 100 Units/mL FlexPen 1-5 Units  1-5 Units Subcutaneous TID CC    hydrocortisone (Anusol-HC) 25 MG rectal suppository 25 mg  25 mg Rectal BID    clopidogrel (Plavix) tab 75 mg  75 mg Oral Daily            Data Review:     Labs:   Lab Results   Component Value Date     06/05/2024     06/05/2024    K 3.8 06/05/2024    CL 97 06/05/2024    CO2 27.0 06/05/2024    BUN 31 06/05/2024    CREATSERUM 5.71 06/05/2024    CA 9.4 06/05/2024    ALB 3.7 06/05/2024    PHOS 5.2 06/05/2024       Recent Labs   Lab 06/05/24  0615   WBC 9.2   HGB 9.6*   HCT 29.1*   MCV 92.4   MCH  30.5   MCHC 33.0   RDW 18.3*   NEPRELIM 6.73   .0       No results for input(s): \"COLORUR\", \"CLARITY\", \"SPECGRAVITY\", \"GLUUR\", \"BILUR\", \"KETUR\", \"BLOODURINE\", \"PHURINE\", \"PROUR\", \"UROBILINOGEN\", \"NITRITE\", \"LEUUR\", \"WBCUR\", \"RBCUR\", \"BACUR\", \"EPIUR\" in the last 168 hours.    No results for input(s): \"URINE\", \"CULTI\", \"BLDSMR\" in the last 168 hours.      Imaging:  No results found.                     Catracho Granados MD

## 2024-06-05 NOTE — PLAN OF CARE
Problem: Patient Centered Care  Goal: Patient preferences are identified and integrated in the patient's plan of care  Description: Interventions:  - What would you like us to know as we care for you? Patient is from home with wife  - Provide timely, complete, and accurate information to patient/family  - Incorporate patient and family knowledge, values, beliefs, and cultural backgrounds into the planning and delivery of care  - Encourage patient/family to participate in care and decision-making at the level they choose  - Honor patient and family perspectives and choices  Outcome: Progressing     Problem: PAIN - ADULT  Goal: Verbalizes/displays adequate comfort level or patient's stated pain goal  Description: INTERVENTIONS:  - Encourage pt to monitor pain and request assistance  - Assess pain using appropriate pain scale  - Administer analgesics based on type and severity of pain and evaluate response  - Implement non-pharmacological measures as appropriate and evaluate response  - Consider cultural and social influences on pain and pain management  - Manage/alleviate anxiety  - Utilize distraction and/or relaxation techniques  - Monitor for opioid side effects  - Notify MD/LIP if interventions unsuccessful or patient reports new pain  - Anticipate increased pain with activity and pre-medicate as appropriate  Outcome: Progressing     Problem: RISK FOR INFECTION - ADULT  Goal: Absence of fever/infection during anticipated neutropenic period  Description: INTERVENTIONS  - Monitor WBC  - Administer growth factors as ordered  - Implement neutropenic guidelines  Outcome: Progressing     Problem: SAFETY ADULT - FALL  Goal: Free from fall injury  Description: INTERVENTIONS:  - Assess pt frequently for physical needs  - Identify cognitive and physical deficits and behaviors that affect risk of falls.  - [place] fall precautions as indicated by assessment.  - Educate pt/family on patient safety including physical  limitations  - Instruct pt to call for assistance with activity based on assessment  - Modify environment to reduce risk of injury  - Provide assistive devices as appropriate  - Consider OT/PT consult to assist with strengthening/mobility  - Encourage toileting schedule  Outcome: Progressing     Problem: DISCHARGE PLANNING  Goal: Discharge to home or other facility with appropriate resources  Description: INTERVENTIONS:  - Identify barriers to discharge w/pt and caregiver  - Include patient/family/discharge partner in discharge planning  - Arrange for needed discharge resources and transportation as appropriate  - Identify discharge learning needs (meds, wound care, etc)  - Arrange for interpreters to assist at discharge as needed  - Consider post-discharge preferences of patient/family/discharge partner  - Complete POLST form as appropriate  - Assess patient's ability to be responsible for managing their own health  - Refer to Case Management Department for coordinating discharge planning if the patient needs post-hospital services based on physician/LIP order or complex needs related to functional status, cognitive ability or social support system  Outcome: Progressing     Problem: HEMATOLOGIC - ADULT  Goal: Free from bleeding injury  Description: (Example usage: patient with low platelets)  INTERVENTIONS:  - Avoid intramuscular injections, enemas and rectal medication administration  - Ensure safe mobilization of patient  - Hold pressure on venipuncture sites to achieve adequate hemostasis  - Assess for signs and symptoms of internal bleeding  - Monitor lab trends  - Patient is to report abnormal signs of bleeding to staff  - Avoid use of toothpicks and dental floss  - Use electric shaver for shaving  - Use soft bristle tooth brush  - Limit straining and forceful nose blowing  Outcome: Progressing     Problem: METABOLIC/FLUID AND ELECTROLYTES - ADULT  Goal: Glucose maintained within prescribed range  Description:  INTERVENTIONS:  - Monitor Blood Glucose as ordered  - Assess for signs and symptoms of hyperglycemia and hypoglycemia  - Administer ordered medications to maintain glucose within target range  - Assess barriers to adequate nutritional intake and initiate nutrition consult as needed  - Instruct patient on self management of diabetes  Outcome: Progressing  Goal: Electrolytes maintained within normal limits  Description: INTERVENTIONS:  - Monitor labs and rhythm and assess patient for signs and symptoms of electrolyte imbalances  - Administer electrolyte replacement as ordered  - Monitor response to electrolyte replacements, including rhythm and repeat lab results as appropriate  - Fluid restriction as ordered  - Instruct patient on fluid and nutrition restrictions as appropriate  Outcome: Progressing  Goal: Hemodynamic stability and optimal renal function maintained  Description: INTERVENTIONS:  - Monitor labs and assess for signs and symptoms of volume excess or deficit  - Monitor intake, output and patient weight  - Monitor urine specific gravity, serum osmolarity and serum sodium as indicated or ordered  - Monitor response to interventions for patient's volume status, including labs, urine output, blood pressure (other measures as available)  - Encourage oral intake as appropriate  - Instruct patient on fluid and nutrition restrictions as appropriate  Outcome: Progressing

## 2024-06-06 VITALS
BODY MASS INDEX: 26.63 KG/M2 | HEIGHT: 63 IN | SYSTOLIC BLOOD PRESSURE: 114 MMHG | RESPIRATION RATE: 18 BRPM | HEART RATE: 69 BPM | TEMPERATURE: 98 F | DIASTOLIC BLOOD PRESSURE: 60 MMHG | WEIGHT: 150.31 LBS | OXYGEN SATURATION: 99 %

## 2024-06-06 LAB
ALBUMIN SERPL-MCNC: 3.6 G/DL (ref 3.2–4.8)
ANION GAP SERPL CALC-SCNC: 7 MMOL/L (ref 0–18)
BASOPHILS # BLD AUTO: 0.04 X10(3) UL (ref 0–0.2)
BASOPHILS NFR BLD AUTO: 0.6 %
BUN BLD-MCNC: 30 MG/DL (ref 9–23)
BUN/CREAT SERPL: 6.6 (ref 10–20)
CALCIUM BLD-MCNC: 9.2 MG/DL (ref 8.7–10.4)
CHLORIDE SERPL-SCNC: 101 MMOL/L (ref 98–112)
CO2 SERPL-SCNC: 28 MMOL/L (ref 21–32)
CREAT BLD-MCNC: 4.55 MG/DL
DEPRECATED RDW RBC AUTO: 60.1 FL (ref 35.1–46.3)
DEPRECATED RDW RBC AUTO: 61.3 FL (ref 35.1–46.3)
EGFRCR SERPLBLD CKD-EPI 2021: 13 ML/MIN/1.73M2 (ref 60–?)
EOSINOPHIL # BLD AUTO: 0.13 X10(3) UL (ref 0–0.7)
EOSINOPHIL NFR BLD AUTO: 1.9 %
ERYTHROCYTE [DISTWIDTH] IN BLOOD BY AUTOMATED COUNT: 18 % (ref 11–15)
ERYTHROCYTE [DISTWIDTH] IN BLOOD BY AUTOMATED COUNT: 18.2 % (ref 11–15)
GLUCOSE BLD-MCNC: 132 MG/DL (ref 70–99)
GLUCOSE BLDC GLUCOMTR-MCNC: 148 MG/DL (ref 70–99)
GLUCOSE BLDC GLUCOMTR-MCNC: 162 MG/DL (ref 70–99)
HCT VFR BLD AUTO: 26 %
HCT VFR BLD AUTO: 27 %
HGB BLD-MCNC: 8.5 G/DL
HGB BLD-MCNC: 8.8 G/DL
IMM GRANULOCYTES # BLD AUTO: 0.03 X10(3) UL (ref 0–1)
IMM GRANULOCYTES NFR BLD: 0.4 %
LYMPHOCYTES # BLD AUTO: 1.14 X10(3) UL (ref 1–4)
LYMPHOCYTES NFR BLD AUTO: 16.9 %
MCH RBC QN AUTO: 30 PG (ref 26–34)
MCH RBC QN AUTO: 30.6 PG (ref 26–34)
MCHC RBC AUTO-ENTMCNC: 32.6 G/DL (ref 31–37)
MCHC RBC AUTO-ENTMCNC: 32.7 G/DL (ref 31–37)
MCV RBC AUTO: 91.9 FL
MCV RBC AUTO: 93.8 FL
MONOCYTES # BLD AUTO: 0.6 X10(3) UL (ref 0.1–1)
MONOCYTES NFR BLD AUTO: 8.9 %
NEUTROPHILS # BLD AUTO: 4.8 X10 (3) UL (ref 1.5–7.7)
NEUTROPHILS # BLD AUTO: 4.8 X10(3) UL (ref 1.5–7.7)
NEUTROPHILS NFR BLD AUTO: 71.3 %
OSMOLALITY SERPL CALC.SUM OF ELEC: 290 MOSM/KG (ref 275–295)
PHOSPHATE SERPL-MCNC: 3.9 MG/DL (ref 2.4–5.1)
PLATELET # BLD AUTO: 176 10(3)UL (ref 150–450)
PLATELET # BLD AUTO: 187 10(3)UL (ref 150–450)
POTASSIUM SERPL-SCNC: 3.4 MMOL/L (ref 3.5–5.1)
RBC # BLD AUTO: 2.83 X10(6)UL
RBC # BLD AUTO: 2.88 X10(6)UL
SODIUM SERPL-SCNC: 136 MMOL/L (ref 136–145)
WBC # BLD AUTO: 6.7 X10(3) UL (ref 4–11)
WBC # BLD AUTO: 6.7 X10(3) UL (ref 4–11)

## 2024-06-06 PROCEDURE — 99239 HOSP IP/OBS DSCHRG MGMT >30: CPT | Performed by: INTERNAL MEDICINE

## 2024-06-06 NOTE — PLAN OF CARE
Problem: PAIN - ADULT  Goal: Verbalizes/displays adequate comfort level or patient's stated pain goal  Description: INTERVENTIONS:  - Encourage pt to monitor pain and request assistance  - Assess pain using appropriate pain scale  - Administer analgesics based on type and severity of pain and evaluate response  - Implement non-pharmacological measures as appropriate and evaluate response  - Consider cultural and social influences on pain and pain management  - Manage/alleviate anxiety  - Utilize distraction and/or relaxation techniques  - Monitor for opioid side effects  - Notify MD/LIP if interventions unsuccessful or patient reports new pain  - Anticipate increased pain with activity and pre-medicate as appropriate  Outcome: Progressing     Problem: RISK FOR INFECTION - ADULT  Goal: Absence of fever/infection during anticipated neutropenic period  Description: INTERVENTIONS  - Monitor WBC  - Administer growth factors as ordered  - Implement neutropenic guidelines  Outcome: Progressing     Problem: SAFETY ADULT - FALL  Goal: Free from fall injury  Description: INTERVENTIONS:  - Assess pt frequently for physical needs  - Identify cognitive and physical deficits and behaviors that affect risk of falls.  - [place] fall precautions as indicated by assessment.  - Educate pt/family on patient safety including physical limitations  - Instruct pt to call for assistance with activity based on assessment  - Modify environment to reduce risk of injury  - Provide assistive devices as appropriate  - Consider OT/PT consult to assist with strengthening/mobility  - Encourage toileting schedule  Outcome: Progressing     Problem: DISCHARGE PLANNING  Goal: Discharge to home or other facility with appropriate resources  Description: INTERVENTIONS:  - Identify barriers to discharge w/pt and caregiver  - Include patient/family/discharge partner in discharge planning  - Arrange for needed discharge resources and transportation as  appropriate  - Identify discharge learning needs (meds, wound care, etc)  - Arrange for interpreters to assist at discharge as needed  - Consider post-discharge preferences of patient/family/discharge partner  - Complete POLST form as appropriate  - Assess patient's ability to be responsible for managing their own health  - Refer to Case Management Department for coordinating discharge planning if the patient needs post-hospital services based on physician/LIP order or complex needs related to functional status, cognitive ability or social support system  Outcome: Progressing     Problem: HEMATOLOGIC - ADULT  Goal: Free from bleeding injury  Description: (Example usage: patient with low platelets)  INTERVENTIONS:  - Avoid intramuscular injections, enemas and rectal medication administration  - Ensure safe mobilization of patient  - Hold pressure on venipuncture sites to achieve adequate hemostasis  - Assess for signs and symptoms of internal bleeding  - Monitor lab trends  - Patient is to report abnormal signs of bleeding to staff  - Avoid use of toothpicks and dental floss  - Use electric shaver for shaving  - Use soft bristle tooth brush  - Limit straining and forceful nose blowing  Outcome: Progressing     Problem: METABOLIC/FLUID AND ELECTROLYTES - ADULT  Goal: Glucose maintained within prescribed range  Description: INTERVENTIONS:  - Monitor Blood Glucose as ordered  - Assess for signs and symptoms of hyperglycemia and hypoglycemia  - Administer ordered medications to maintain glucose within target range  - Assess barriers to adequate nutritional intake and initiate nutrition consult as needed  - Instruct patient on self management of diabetes  Outcome: Progressing  Goal: Electrolytes maintained within normal limits  Description: INTERVENTIONS:  - Monitor labs and rhythm and assess patient for signs and symptoms of electrolyte imbalances  - Administer electrolyte replacement as ordered  - Monitor response to  electrolyte replacements, including rhythm and repeat lab results as appropriate  - Fluid restriction as ordered  - Instruct patient on fluid and nutrition restrictions as appropriate  Outcome: Progressing  Goal: Hemodynamic stability and optimal renal function maintained  Description: INTERVENTIONS:  - Monitor labs and assess for signs and symptoms of volume excess or deficit  - Monitor intake, output and patient weight  - Monitor urine specific gravity, serum osmolarity and serum sodium as indicated or ordered  - Monitor response to interventions for patient's volume status, including labs, urine output, blood pressure (other measures as available)  - Encourage oral intake as appropriate  - Instruct patient on fluid and nutrition restrictions as appropriate  Outcome: Progressing

## 2024-06-06 NOTE — PROGRESS NOTES
[facility]  part of [facility]    Progress Note    Ollie Hernández Patient Status:  Inpatient    1945 MRN [id number]   Location [facility] 5SW/SE Attending Wili Parmar MD   Hosp Day # 2 PCP Wili Parmar MD       Subjective:   Ollie Hernández is a(n) 79 year old male     ROS:     Constitutional:  Negative for decreased activity, fever, irritability and lethargy  ENMT:  Negative for ear drainage, hearing loss and nasal drainage  Eyes:  Negative for eye discharge and vision loss  Cardiovascular:  Negative for chest pain, sob, irregular heartbeat/palpitations  Respiratory:  Negative for cough, dyspnea and wheezing  Gastrointestinal: Feels okay no active bleeding  Genitourinary:  Negative for dysuria and hematuria  Endocrine:  Negative for abnormal sleep patterns, increased activity, polydipsia and polyphagia  Hema/Lymph:  Negative for easy bleeding and easy bruising  Integumentary:  Negative for pruritus and rash  Musculoskeletal:  Negative for bone/joint symptoms  Neurological:  Negative for gait disturbance  Psychiatric:  Negative for inappropriate interaction and psychiatric symptoms      Vitals:    24 1345   BP: 127/50   Pulse: 77   Resp: 18   Temp: 98.4 °F (36.9 °C)           PHYSICAL EXAM:   Constitutional: appears well hydrated alert and responsive no acute distress noted  Head/Face: normocephalic  Eyes/Vision: normal extraocular motion is intact  Nose/Mouth/Throat:mucous membranes are moist and no oral lesions are noted  Neck/Thyroid: neck is supple without adenopathy  Lymphatic: no abnormal cervical, supraclavicular adenopathy is noted  Respiratory:  lungs are clear to auscultation bilaterally, normal respiratory effort  Cardiovascular: regular rate and rhythm no murmurs, gallups, or rubs  Abdomen: soft, non-tender, non-distended, BS normal  Vascular: well perfused femoral, and pedal pulses normal  Skin/Hair: no unusual rashes present, no abnormal bruising  noted  Back/Spine: no abnormalities noted  Musculoskeletal: full ROM all extremities good strength  no deformities  Extremities: Trace edema  Neurological:  Grossly normal    Results:     Laboratory Data:  Lab Results   Component Value Date    WBC 9.2 06/05/2024    HGB 9.6 (L) 06/05/2024    HCT 29.1 (L) 06/05/2024    .0 06/05/2024    CREATSERUM 5.71 (H) 06/05/2024    BUN 31 (H) 06/05/2024     (L) 06/05/2024    K 3.8 06/05/2024    CL 97 (L) 06/05/2024    CO2 27.0 06/05/2024     (H) 06/05/2024    CA 9.4 06/05/2024    ALB 3.7 06/05/2024    ALKPHO 64 06/01/2024    BILT 0.4 06/01/2024    TP 6.7 06/01/2024    AST 25 06/01/2024    ALT 26 06/01/2024    T4F 0.9 08/31/2022    TSH 2.060 06/23/2023     (H) 07/25/2023    PSA 2.94 10/20/2021    ESRML 79 (H) 03/16/2016    CRP 0.36 03/16/2016    MG 2.1 11/20/2023    PHOS 5.2 (H) 06/05/2024    TROP <0.045 07/25/2019     08/05/2023    B12 631 08/05/2023       Imaging:  [unfilled]   No results found.      ASSESSMENT/PLAN:   Assessment       ESRD had dialysis today labs good    Number two 2 GI bleeding stable had colon lesion which was cauterized hemoglobin stable hi 9 range iron is low we will give iron and Epogen    Coronary artery disease has stents and continue aspirin and Plavix when attending ready to resume    Hopefully home soon         6/5/2024  José Callahan MD

## 2024-06-06 NOTE — PROGRESS NOTES
[facility] Cardiology  Consultation Note      Ollie Hernández Patient Status:  Observation    1945 MRN [id number]   Location [facility] 5SW/SE Attending Wili Parmar MD   Hosp Day # 3 PCP Wili Parmar MD     Reason for consult: GIB, recent PCI    Subjective: No CP or SOB. Got plavix + eliquis yesterday and continues to have no bloody stools. Hgb 8.5 but he also received dialysis yesterday.    Impression:  79 year old male admitted with rectal bleeding    ABLA Hgb 11.5 (24) -> 9.8   - Reviewed records: Hgb appears somewhat variable but baseline appears to be 10-11.  - Underwent colonoscopy s/p APC of bleeding lesion and removal of sessile polyp.  CAD - PCI LCX with [facility] ABIMBOLA x 1 (24)  - Restarted Eliquis + plavix on 24  - Hgb mildly down today but after dialysis. He has not had any bloody stools.  PAF on Eliquis  NSVT on Zio patch  Chronic HFpEF - compensated  ESRD on HD MWF  DM2  HTN  KRAIG on CPAP  LVEF 60% (echo )    Recommendations:  Risk of life threatening stent thrombosis outweighs risk of life threatening hemorrhage at present. Patient without any further melanic or bloody stools.  Will trial dual anti-thrombotic therapy with Plavix + Eliquis once more. If he has more bleeding, then would favor DAPT (ASA + plavix) for 1 month and then switch to ASA + Eliquis for a total duration of therapy for 6 months.  Follow Hgb closely and transfuse for Hgb < 8 given CAD.   Currently hypertensive, continue coreg, norvasc and hydralazine cautiously.   Okay for DC if Hgb is stable and patient has no bloody stools. He has follow up with Caridad Valente in 1 week.    Primary cardiologist: Herman Phelan MD     History of Present Illness:  Ollie Hernández is a 79 year old male with CAD and PCI to LCX (24, abnormal stress test, obtained due to recurrent NSVT on Zio patch), PAF on Eliquis, chronic HFpEF, ESRD on HD, was on ASA, plavix, Eliquis x 1 week post PCI then  stopped ASA - who presented to [facility] on 6/1/2024 with rectal bleeding x 3 days.  Hgb 11.5 -> 9.8.     Medications:  Current Facility-Administered Medications   Medication Dose Route Frequency    epoetin donna (Epogen, Procrit) 5,000 Units injection  5,000 Units Subcutaneous Once per day on Monday Wednesday Friday    iron sucrose (Venofer) 300 mg in sodium chloride 0.9% 250 mL IVPB  300 mg Intravenous Daily    apixaban (Eliquis) tab 5 mg  5 mg Oral BID    heparin (Porcine) 1000 UNIT/ML injection 1,500 Units  1.5 mL Intravenous PRN Dialysis    lidocaine-prilocaine (Emla) 2.5-2.5 % cream   Topical PRN    sodium chloride 0.9 % IV bolus 100 mL  100 mL Intravenous Q30 Min PRN    And    albumin human (Albumin) 25% injection 25 g  25 g Intravenous PRN Dialysis    albuterol (Ventolin HFA) 108 (90 Base) MCG/ACT inhaler 2 puff  2 puff Inhalation Q4H PRN    atorvastatin (Lipitor) tab 40 mg  40 mg Oral Nightly    fluticasone furoate-vilanterol (Breo Ellipta) 200-25 MCG/ACT inhaler 1 puff  1 puff Inhalation Daily    carvedilol (Coreg) tab 25 mg  25 mg Oral BID    cetirizine (ZyrTEC) tab 5 mg  5 mg Oral QOD    cholecalciferol (Vitamin D3) tab 5,000 Units  5,000 Units Oral BID    escitalopram (Lexapro) tab 5 mg  5 mg Oral Daily    amLODIPine (Norvasc) tab 10 mg  10 mg Oral Daily    fluticasone propionate (Flonase) 50 MCG/ACT nasal suspension 2 spray  2 spray Nasal Daily    hydrALAZINE (Apresoline) tab 25 mg  25 mg Oral Q8H STEPHANIE    methocarbamol (Robaxin) tab 500 mg  500 mg Oral BID PRN    pantoprazole (Protonix) DR tab 40 mg  40 mg Oral QAM AC    traMADol (Ultram) tab 50 mg  50 mg Oral Q12H PRN    tetrahydrozoline (Visine) 0.05 % ophthalmic solution 1 drop  1 drop Both Eyes BID PRN    glucose (Dex4) 15 GM/59ML oral liquid 15 g  15 g Oral Q15 Min PRN    Or    glucose (Glutose) 40% oral gel 15 g  15 g Oral Q15 Min PRN    Or    glucose-vitamin C (Dex-4) chewable tab 4 tablet  4 tablet Oral Q15 Min PRN    Or    dextrose 50%  injection 50 mL  50 mL Intravenous Q15 Min PRN    Or    glucose (Dex4) 15 GM/59ML oral liquid 30 g  30 g Oral Q15 Min PRN    Or    glucose (Glutose) 40% oral gel 30 g  30 g Oral Q15 Min PRN    Or    glucose-vitamin C (Dex-4) chewable tab 8 tablet  8 tablet Oral Q15 Min PRN    acetaminophen (Tylenol Extra Strength) tab 500 mg  500 mg Oral Q4H PRN    melatonin tab 3 mg  3 mg Oral Nightly PRN    ondansetron (Zofran) 4 MG/2ML injection 4 mg  4 mg Intravenous Q6H PRN    metoclopramide (Reglan) 5 mg/mL injection 5 mg  5 mg Intravenous Q8H PRN    insulin aspart (NovoLOG) 100 Units/mL FlexPen 1-5 Units  1-5 Units Subcutaneous TID CC    hydrocortisone (Anusol-HC) 25 MG rectal suppository 25 mg  25 mg Rectal BID    clopidogrel (Plavix) tab 75 mg  75 mg Oral Daily       Past Medical History:    Anemia    Asthma (HCC)    Back problem    BPH (benign prostatic hyperplasia)    Calculus of kidney    Cataract    Diabetes (HCC)    ESRD (end stage renal disease) on dialysis (HCC)    Essential hypertension    High blood pressure    High cholesterol    History of blood transfusion    Hyperlipidemia    Neuropathy    hands and feet    KRAIG on CPAP    Renal disorder    Sleep apnea    Visual impairment    glasses    Vocal cord paralysis, unilateral partial       Past Surgical History:   Procedure Laterality Date    Appendectomy      1981    Back surgery      Neck/back - 1998    Capsule endoscopy - internal referral      Cataract      12/2021 and 1/2022    Colonoscopy      Colonoscopy N/A 1/25/2021    Procedure: COLONOSCOPY;  Surgeon: Michael Gautam MD;  [facility]    Colonoscopy N/A 6/3/2024    Procedure: COLONOSCOPY;  Surgeon: Gabriel Saldana MD;  Location: [facility] ENDOSCOPY    Hand/finger surgery unlisted      Accidental trauma    Upper gi endoscopy,diagnosis         Family History  family history includes Breast Cancer in his mother; Cancer in his father; Diabetes in his maternal grandfather, maternal grandmother, and mother;  Heart Disorder in an other family member.    Social History   reports that he quit smoking about 43 years ago. His smoking use included cigarettes. He started smoking about 60 years ago. He has a 17 pack-year smoking history. He has never used smokeless tobacco. He reports that he does not drink alcohol and does not use drugs.     Allergies  Allergies   Allergen Reactions    Adhesive Tape OTHER (SEE COMMENTS)     Severe rashes    Dust Mite Extract RASH       Review of Systems:  As per HPI, otherwise 10 point ROS is negative in detail.    Physical Exam:  Blood pressure 114/60, pulse 69, temperature 98.3 °F (36.8 °C), temperature source Tympanic, resp. rate 18, height 5' 3\" (1.6 m), weight 150 lb 4.8 oz (68.2 kg), SpO2 99%.  Temp (24hrs), Av.6 °F (37 °C), Min:98.1 °F (36.7 °C), Max:99.4 °F (37.4 °C)    Wt Readings from Last 3 Encounters:   24 150 lb 4.8 oz (68.2 kg)   24 163 lb 9.6 oz (74.2 kg)   24 152 lb 1.6 oz (69 kg)       General: Awake and alert; in no acute distress  HEENT: Extraocular movements are intact; sclerae are anicteric; scalp is atraumatic  Neck: Supple; no JVD; no carotid bruits  Cardiac: Regular rate and regular rhythm; normal S1 and S2, no murmurs, rubs, or gallops are appreciated  Lungs: Clear to auscultation bilaterally; no accessory muscle use is noted, no wheezes, rhonci or rales  Abdomen: Soft, non-distended, non-tender; bowel sounds are normoactive  Extremities: Warm, no edema, clubbing or cyanosis; moves all 4 extremities normally, distal pulses intact and equal  Psychiatric: Normal mood and affect; answers questions appropriately  Dermatologic: No rashes; normal skin turgor    Diagnostic testing:    Labs:   No results found for: \"PT\", \"INR\"     Lab Results   Component Value Date    WBC 6.7 2024    HGB 8.5 2024    HCT 26.0 2024    .0 2024    CREATSERUM 4.55 2024    BUN 30 2024     2024    K 3.4 2024      06/06/2024    CO2 28.0 06/06/2024     06/06/2024    CA 9.2 06/06/2024    ALB 3.6 06/06/2024    PHOS 3.9 06/06/2024    PGLU 148 06/06/2024       Cardiac diagnostics:    Cath 5/21/24: LM 20%, LAD 70% apex, LCX 80%, RCA small, s/p ABIMBOLA x 1 LCX (2.75 x 15 mm)    Luis Orozco MD  Interventional Cardiology  [place]

## 2024-06-06 NOTE — PROGRESS NOTES
[facility]  part of [facility]    Progress Note    Ollie Hernández Patient Status:  Inpatient    1945 MRN [id number]   Location [facility] 5SW/SE Attending Wili Parmar MD   Hosp Day # 3 PCP Wili Parmar MD         Assessment and Plan:     Rectal bleeding   Acute on chronic anemia  -pt with hx of constipation/hemorrhoids/rectal bleeding in the past (saw GI Dr. Gautam for this in 10/2020)  -last colonoscopy 21 (Dr. Gautam) -- internal hemorrhoids, colon polyps x 2 (one tubular adenoma)  -eliquis on hold  -suspect internal hemorrhoidal bleed; no external hemorrhoids on exam  -empiric anusol HC suppository BID  -pt had a small BM this am without blood  -Hgb 9.6 - > 8.5.  Will repeat H&H in the afternoon  -GI consult appreciated; post EGD and colonoscopy with Dr. Saldana 6/3, small hepatic flexure versus proximal transverse colon lesion 1 mm/AVMs due for lesion status post cautery  -have resumed Plavix 75 mg po every day;resuming Eliquis 5 mg po BID today;  Ongoing fiber supplementation     CAD  -C 2024 by Dr. Luis Orozco: 70% lesion of apical LAD, 80% lesion of LCx  -S/p PCI of LCx (medical management of the LAD lesion)  -s/p aspirin, plavix and eliquis x 1 week, now just plavix and eliquis  -cardiology consult appreciated; okay to resume Eliquis and Plavix, resume 2024     Pharyngeal dysphagia  --right side hypomobile oropharyngeal dysphagia  - swallow study 2024: intermittent shallow and deep laryngeal penetration without aspiration, small zenker's diverticulum, bulky endplate osteophytes contributing to dysphagia  - to see Dr. Del Angel (ENT at [place]) for further imaging     Paroxysmal A-fib  --dx'ed 2023  --Zio monitor 10/2023 -- NSVT (normal EF 65%); on carvedilol  - followed by EP Dr. Phelan  --Eliquis resumed as above     Cervical radiculopathy  Chronic back pain with neuropathy  -CT scan of the cervical neck 2019 ; history of cervical decompressive  laminectomy with multilevel degenerative disc disease  - s/p physical therapy in the past  --MRI brain and MRI cervical spine done 12/29/22 (MRI brain w/mod chronic microvascular ischemic changes bilat cerebral hemispheres, basal ganglia and thalami; MRI cervical spine w/multilevel spinal stenosis)  --saw VINICIO Araujo in 1/2023; had subsequent MRI thoracic/lumbar spine.  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  Pt was referred for PT in 2/2023.      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 Sevilla  --HgbA1c 5.5 in 9/2023  - Gabapentin for neuropathy  --at home: on Tradjenta 5mg daily (no longer on insulin)  -- SSI in hospital  --BS 140s     ESRD on hemodialysis  - 2/2 prolonged history of diabetes and hypertension  - Follows with Dr. Martinez  -Last hemodialysis with 1 L out 6/5/2024 per Dr. Callahan     Hypertension  - carvedilol 25 mg BID, hydralazine 25mg q8h, felodipine 10mg/day (on amlodipine in hosp due to formulary)     Hyperlipidemia  - Continue with On atorvastatin 40 mg daily, aspirin     BPH  -no longer takes trospium or tamsulosin     Pulmonary hypertension  -Comanagement of KRAIG and chronic diastolic heart failure  - stable     KRAIG on CPAP  - sees pulm Dr. Landeros     Cataracts  -Seems to be stable, follows with Dr. Chase of ophthalmology     Gout  Flareup in 6/2022. NO recurrence  -Seems to be stable     Anemia of ESRD  -Baseline hemoglobin seems to be around 10-11  -managed by nephrology; on aranesp  -Hgb decreased as above     Sensorineural hearing loss  Mild-moderate per audiology testing/14/2022.  He may be a hearing aid candidate in the future     Unsteady Gait  Ongoing fatigue due to multiple comorbidities as above  - uses a walker, 2 wheeled-2 constipation.  Does not want a rollator  walker at this time  - He is a fall risk with his unsteady gait, fatigue.     Anxiety  Lexapro 5 mg once a day.          VTE PPX: SCDs - resuming Eliquis as above    Dispo: Slight drop in hemoglobin this morning, repeat H&H in the afternoon.  Discussed with patient that ideally we get another blood test before discharge, but patient wants to leave this morning.  He agrees to monitor for any recurrence of bleeding episodes or severe symptoms that would warrant return to the ER.  He will check a CBC within the next 3 days prior to following up with me.    SUBJECTIVE:    No bleeding episodes.  Had small orly yesterday, but no black stools.  Reports that he needs to go home today and does not want to stay for another blood test in the afternoon      OBJECTIVE:  PHYSICAL EXAM:     Vital signs in last 24 hours:  /75 (BP Location: Left arm)   Pulse 72   Temp 98.1 °F (36.7 °C) (Oral)   Resp 18   Ht 5' 3\" (1.6 m)   Wt 150 lb 4.8 oz (68.2 kg)   SpO2 98%   BMI 26.62 kg/m²     Intake/Output:    Intake/Output Summary (Last 24 hours) at 6/6/2024 0742  Last data filed at 6/6/2024 0559  Gross per 24 hour   Intake 720 ml   Output 1000 ml   Net -280 ml       Wt Readings from Last 3 Encounters:   06/05/24 150 lb 4.8 oz (68.2 kg)   05/28/24 163 lb 9.6 oz (74.2 kg)   05/22/24 152 lb 1.6 oz (69 kg)         Constitutional: alert and oriented x3 in no acute distress  HEENT- EOMI, PERRL  Nose/Mouth/Throat: pharynx without erythema  Neck/Thyroid: neck supple; no thyromegaly  Cardiovascular: RRR, S1, S2, no S3 or murmur  Respiratory: lungs without crackles or wheezes  Abdomen: normoactive bowel sounds, soft, non-tender and non-distended  Extremities: no clubbing, cyanosis or edema          Meds:   Current Facility-Administered Medications   Medication Dose Route Frequency    epoetin donna (Epogen, Procrit) 5,000 Units injection  5,000 Units Subcutaneous Once per day on Monday Wednesday Friday    iron sucrose (Venofer) 300 mg in  sodium chloride 0.9% 250 mL IVPB  300 mg Intravenous Daily    apixaban (Eliquis) tab 5 mg  5 mg Oral BID    heparin (Porcine) 1000 UNIT/ML injection 1,500 Units  1.5 mL Intravenous PRN Dialysis    lidocaine-prilocaine (Emla) 2.5-2.5 % cream   Topical PRN    sodium chloride 0.9 % IV bolus 100 mL  100 mL Intravenous Q30 Min PRN    And    albumin human (Albumin) 25% injection 25 g  25 g Intravenous PRN Dialysis    albuterol (Ventolin HFA) 108 (90 Base) MCG/ACT inhaler 2 puff  2 puff Inhalation Q4H PRN    atorvastatin (Lipitor) tab 40 mg  40 mg Oral Nightly    fluticasone furoate-vilanterol (Breo Ellipta) 200-25 MCG/ACT inhaler 1 puff  1 puff Inhalation Daily    carvedilol (Coreg) tab 25 mg  25 mg Oral BID    cetirizine (ZyrTEC) tab 5 mg  5 mg Oral QOD    cholecalciferol (Vitamin D3) tab 5,000 Units  5,000 Units Oral BID    escitalopram (Lexapro) tab 5 mg  5 mg Oral Daily    amLODIPine (Norvasc) tab 10 mg  10 mg Oral Daily    fluticasone propionate (Flonase) 50 MCG/ACT nasal suspension 2 spray  2 spray Nasal Daily    hydrALAZINE (Apresoline) tab 25 mg  25 mg Oral Q8H STEPHANIE    methocarbamol (Robaxin) tab 500 mg  500 mg Oral BID PRN    pantoprazole (Protonix) DR tab 40 mg  40 mg Oral QAM AC    traMADol (Ultram) tab 50 mg  50 mg Oral Q12H PRN    tetrahydrozoline (Visine) 0.05 % ophthalmic solution 1 drop  1 drop Both Eyes BID PRN    glucose (Dex4) 15 GM/59ML oral liquid 15 g  15 g Oral Q15 Min PRN    Or    glucose (Glutose) 40% oral gel 15 g  15 g Oral Q15 Min PRN    Or    glucose-vitamin C (Dex-4) chewable tab 4 tablet  4 tablet Oral Q15 Min PRN    Or    dextrose 50% injection 50 mL  50 mL Intravenous Q15 Min PRN    Or    glucose (Dex4) 15 GM/59ML oral liquid 30 g  30 g Oral Q15 Min PRN    Or    glucose (Glutose) 40% oral gel 30 g  30 g Oral Q15 Min PRN    Or    glucose-vitamin C (Dex-4) chewable tab 8 tablet  8 tablet Oral Q15 Min PRN    acetaminophen (Tylenol Extra Strength) tab 500 mg  500 mg Oral Q4H PRN    melatonin  tab 3 mg  3 mg Oral Nightly PRN    ondansetron (Zofran) 4 MG/2ML injection 4 mg  4 mg Intravenous Q6H PRN    metoclopramide (Reglan) 5 mg/mL injection 5 mg  5 mg Intravenous Q8H PRN    insulin aspart (NovoLOG) 100 Units/mL FlexPen 1-5 Units  1-5 Units Subcutaneous TID CC    hydrocortisone (Anusol-HC) 25 MG rectal suppository 25 mg  25 mg Rectal BID    clopidogrel (Plavix) tab 75 mg  75 mg Oral Daily            Data Review:     Labs:          Recent Labs   Lab 06/06/24  0646   WBC 6.7   HGB 8.5*   HCT 26.0*   MCV 91.9   MCH 30.0   MCHC 32.7   RDW 18.2*   NEPRELIM 4.80   .0       No results for input(s): \"COLORUR\", \"CLARITY\", \"SPECGRAVITY\", \"GLUUR\", \"BILUR\", \"KETUR\", \"BLOODURINE\", \"PHURINE\", \"PROUR\", \"UROBILINOGEN\", \"NITRITE\", \"LEUUR\", \"WBCUR\", \"RBCUR\", \"BACUR\", \"EPIUR\" in the last 168 hours.    No results for input(s): \"URINE\", \"CULTI\", \"BLDSMR\" in the last 168 hours.      Imaging:  No results found.        Wili Parmar MD, 06/06/24, 8:24 AM

## 2024-06-06 NOTE — PLAN OF CARE
Problem: Patient Centered Care  Goal: Patient preferences are identified and integrated in the patient's plan of care  Description: Interventions:  - What would you like us to know as we care for you? From home with wife     - Provide timely, complete, and accurate information to patient/family  - Incorporate patient and family knowledge, values, beliefs, and cultural backgrounds into the planning and delivery of care  - Encourage patient/family to participate in care and decision-making at the level they choose  - Honor patient and family perspectives and choices  Outcome: Adequate for Discharge     Problem: PAIN - ADULT  Goal: Verbalizes/displays adequate comfort level or patient's stated pain goal  Description: INTERVENTIONS:  - Encourage pt to monitor pain and request assistance  - Assess pain using appropriate pain scale  - Administer analgesics based on type and severity of pain and evaluate response  - Implement non-pharmacological measures as appropriate and evaluate response  - Consider cultural and social influences on pain and pain management  - Manage/alleviate anxiety  - Utilize distraction and/or relaxation techniques  - Monitor for opioid side effects  - Notify MD/LIP if interventions unsuccessful or patient reports new pain  - Anticipate increased pain with activity and pre-medicate as appropriate  Outcome: Adequate for Discharge     Problem: RISK FOR INFECTION - ADULT  Goal: Absence of fever/infection during anticipated neutropenic period  Description: INTERVENTIONS  - Monitor WBC  - Administer growth factors as ordered  - Implement neutropenic guidelines  Outcome: Adequate for Discharge     Problem: SAFETY ADULT - FALL  Goal: Free from fall injury  Description: INTERVENTIONS:  - Assess pt frequently for physical needs  - Identify cognitive and physical deficits and behaviors that affect risk of falls.  - [place] fall precautions as indicated by assessment.  - Educate pt/family on patient safety  including physical limitations  - Instruct pt to call for assistance with activity based on assessment  - Modify environment to reduce risk of injury  - Provide assistive devices as appropriate  - Consider OT/PT consult to assist with strengthening/mobility  - Encourage toileting schedule  Outcome: Adequate for Discharge     Problem: DISCHARGE PLANNING  Goal: Discharge to home or other facility with appropriate resources  Description: INTERVENTIONS:  - Identify barriers to discharge w/pt and caregiver  - Include patient/family/discharge partner in discharge planning  - Arrange for needed discharge resources and transportation as appropriate  - Identify discharge learning needs (meds, wound care, etc)  - Arrange for interpreters to assist at discharge as needed  - Consider post-discharge preferences of patient/family/discharge partner  - Complete POLST form as appropriate  - Assess patient's ability to be responsible for managing their own health  - Refer to Case Management Department for coordinating discharge planning if the patient needs post-hospital services based on physician/LIP order or complex needs related to functional status, cognitive ability or social support system  Outcome: Adequate for Discharge     Problem: HEMATOLOGIC - ADULT  Goal: Free from bleeding injury  Description: (Example usage: patient with low platelets)  INTERVENTIONS:  - Avoid intramuscular injections, enemas and rectal medication administration  - Ensure safe mobilization of patient  - Hold pressure on venipuncture sites to achieve adequate hemostasis  - Assess for signs and symptoms of internal bleeding  - Monitor lab trends  - Patient is to report abnormal signs of bleeding to staff  - Avoid use of toothpicks and dental floss  - Use electric shaver for shaving  - Use soft bristle tooth brush  - Limit straining and forceful nose blowing  Outcome: Adequate for Discharge     Problem: METABOLIC/FLUID AND ELECTROLYTES - ADULT  Goal:  Glucose maintained within prescribed range  Description: INTERVENTIONS:  - Monitor Blood Glucose as ordered  - Assess for signs and symptoms of hyperglycemia and hypoglycemia  - Administer ordered medications to maintain glucose within target range  - Assess barriers to adequate nutritional intake and initiate nutrition consult as needed  - Instruct patient on self management of diabetes  Outcome: Adequate for Discharge  Goal: Electrolytes maintained within normal limits  Description: INTERVENTIONS:  - Monitor labs and rhythm and assess patient for signs and symptoms of electrolyte imbalances  - Administer electrolyte replacement as ordered  - Monitor response to electrolyte replacements, including rhythm and repeat lab results as appropriate  - Fluid restriction as ordered  - Instruct patient on fluid and nutrition restrictions as appropriate  Outcome: Adequate for Discharge  Goal: Hemodynamic stability and optimal renal function maintained  Description: INTERVENTIONS:  - Monitor labs and assess for signs and symptoms of volume excess or deficit  - Monitor intake, output and patient weight  - Monitor urine specific gravity, serum osmolarity and serum sodium as indicated or ordered  - Monitor response to interventions for patient's volume status, including labs, urine output, blood pressure (other measures as available)  - Encourage oral intake as appropriate  - Instruct patient on fluid and nutrition restrictions as appropriate  Outcome: Adequate for Discharge   Patient discharge home with wife. Education completed. Peripheral IV removed. Belongings gathered and sent them with patient.

## 2024-06-06 NOTE — CM/SW NOTE
06/06/24 0900   Discharge disposition   Expected discharge disposition Home or Self   Discharge transportation Private car     Pt discussed in RN DC Rounds. Pt is self, NN at this time.       SW/CM to remain available for support and/or discharge planning.     Annette Shannon MSW, LSW   x [phone]

## 2024-06-07 ENCOUNTER — PATIENT OUTREACH (OUTPATIENT)
Dept: CASE MANAGEMENT | Age: 79
End: 2024-06-07

## 2024-06-07 NOTE — DISCHARGE SUMMARY
[facility]  part of [facility]    Discharge Summary    Ollie Hernández Patient Status:  Inpatient    1945 MRN [id number]   Location [facility] 5SW/SE Attending No att. providers found   Hosp Day # 3 PCP Wili Parmar MD     Date of Admission: 2024   Date of Discharge: 2024    Admitting Diagnosis: Lower GI bleed [K92.2]    Disposition: Home    Discharge Diagnosis: .Principal Problem:    Lower GI bleed  Active Problems:    Mixed hyperlipidemia    Pulmonary HTN (HCC)    KRAIG (obstructive sleep apnea)    Gout    Type 2 diabetes mellitus with chronic kidney disease on chronic dialysis, with long-term current use of insulin (HCC)    Anemia of chronic renal failure    ESRD (end stage renal disease) on dialysis (HCC)    Atrial fibrillation (HCC)    AVM (arteriovenous malformation) of colon    Anemia, blood loss      Hospital Course:   Reason for Admission: Acute gastrointestinal bleed    Discharge Physical Exam:  Vital Signs:  Blood pressure 114/60, pulse 69, temperature 98.3 °F (36.8 °C), temperature source Tympanic, resp. rate 18, height 5' 3\" (1.6 m), weight 150 lb 4.8 oz (68.2 kg), SpO2 99%.          Constitutional: alert and oriented x3 in no acute distress  HEENT- EOMI, PERRL  Nose/Mouth/Throat: pharynx without erythema  Neck/Thyroid: neck supple; no thyromegaly  Cardiovascular: RRR, S1, S2, no S3 or murmur  Respiratory: lungs without crackles or wheezes  Abdomen: normoactive bowel sounds, soft, non-tender and non-distended  Extremities: no clubbing, cyanosis or edema    Hospital Course:   79-year-old male with past medical history of coronary artery disease status post recent coronary stenting on 2024, currently on Plavix, paroxysmal atrial fibrillation on Eliquis, ESRD on hemodialysis, hypertension admitted with persistent rectal bleeding.  Hemoglobin trended 10.1 down to as low as 8.5.  No blood transfusion was required.  Gastroenterology was consulted in the  emergency department.  Eliquis was held. EGD and colonoscopy with Dr. Saldana 6/3, small hepatic flexure versus proximal transverse colon lesion 1 mm/AVMs due for lesion status post cautery.  Cardiology was consulted due to recent stent.  Was resumed on aspirin and Plavix.  No further bleeding episodes had occurred.  Prior to discharge, patient was resumed on Eliquis and Plavix and hemoglobin otherwise has remained stable.  Throughout hospitalization, did receive hemodialysis per nephrology.  Deemed stable for discharge 6/6/2024    Full plan discharge today as follows:    Rectal bleeding   Acute on chronic anemia  -pt with hx of constipation/hemorrhoids/rectal bleeding in the past (saw GI Dr. Gautam for this in 10/2020)  -last colonoscopy 1/25/21 (Dr. Gautam) -- internal hemorrhoids, colon polyps x 2 (one tubular adenoma)  -eliquis on hold  -suspect internal hemorrhoidal bleed; no external hemorrhoids on exam  -empiric anusol HC suppository BID  -pt had a small BM this am without blood  -Hgb 9.6 - > 8.5.  Will repeat H&H in the afternoon -> 8.8 stable  -GI consult appreciated; post EGD and colonoscopy with Dr. Saldana 6/3, small hepatic flexure versus proximal transverse colon lesion 1 mm/AVMs due for lesion status post cautery  -have resumed Plavix 75 mg po every day;resuming Eliquis 5 mg po BID today;  Ongoing fiber supplementation     CAD  -[facility] 5/21/2024 by Dr. Luis Orozco: 70% lesion of apical LAD, 80% lesion of LCx  -S/p PCI of LCx (medical management of the LAD lesion)  -s/p aspirin, plavix and eliquis x 1 week, now just plavix and eliquis  -cardiology consult appreciated; okay to resume Eliquis and Plavix, resume 6/5/2024     Pharyngeal dysphagia  --right side hypomobile oropharyngeal dysphagia  - swallow study 4/2024: intermittent shallow and deep laryngeal penetration without aspiration, small zenker's diverticulum, bulky endplate osteophytes contributing to dysphagia  - to see Dr. Del Angel (ENT at [place]) for  further imaging     Paroxysmal A-fib  --dx'ed 9/2023  --Zio monitor 10/2023 -- NSVT (normal EF 65%); on carvedilol  - followed by EP Dr. Phelan  --Eliquis resumed as above     Cervical radiculopathy  Chronic back pain with neuropathy  -CT scan of the cervical neck 7/2019 ; history of cervical decompressive laminectomy with multilevel degenerative disc disease  - s/p physical therapy in the past  --MRI brain and MRI cervical spine done 12/29/22 (MRI brain w/mod chronic microvascular ischemic changes bilat cerebral hemispheres, basal ganglia and thalami; MRI cervical spine w/multilevel spinal stenosis)  --saw NS PA Dr. Araujo in 1/2023; had subsequent MRI thoracic/lumbar spine.  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  Pt was referred for PT in 2/2023.      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 Sevilla  --HgbA1c 5.5 in 9/2023  - Gabapentin for neuropathy  --at home: on Tradjenta 5mg daily (no longer on insulin)  -- SSI in hospital  --BS 140s     ESRD on hemodialysis  - 2/2 prolonged history of diabetes and hypertension  - Follows with Dr. Martinez  -Last hemodialysis with 1 L out 6/5/2024 per Dr. Callahan     Hypertension  - carvedilol 25 mg BID, hydralazine 25mg q8h, felodipine 10mg/day (on amlodipine in hosp due to formulary)     Hyperlipidemia  - Continue with On atorvastatin 40 mg daily, aspirin     BPH  -no longer takes trospium or tamsulosin     Pulmonary hypertension  -Comanagement of KRAIG and chronic diastolic heart failure  - stable     KRAIG on CPAP  - sees pulm Dr. Landeros     Cataracts  -Seems to be stable, follows with Dr. Chase of ophthalmology     Gout  Flareup in 6/2022. NO recurrence  -Seems to be stable     Anemia of ESRD  -Baseline hemoglobin seems to be around 10-11  -managed by nephrology;  on aranesp  -Hgb decreased as above     Sensorineural hearing loss  Mild-moderate per audiology testing/14/2022.  He may be a hearing aid candidate in the future     Unsteady Gait  Ongoing fatigue due to multiple comorbidities as above  - uses a walker, 2 wheeled-2 constipation.  Does not want a rollator walker at this time  - He is a fall risk with his unsteady gait, fatigue.     Anxiety  Lexapro 5 mg once a day.            Complications: None    Consultants         Provider   Role Specialty     Emilio Talley MD      Consulting Physician GASTROENTEROLOGY     Juan Moore MD      Consulting Physician CARDIOLOGY     Katya Melendez APRN      Consulting Physician Nurse Practitioner     Walker Klein MD      Consulting Physician NEPHROLOGY          Surgical Procedures       Case IDs Date Procedure Surgeon Location Status    [id number] 6/3/24 ESOPHAGOGASTRODUODENOSCOPY (EGD) Gabriel Saldana MD [facility] ENDOSCOPY Comp              Discharge Plan:   Discharge Condition: Stable    Discharge Medication List as of 6/6/2024 11:49 AM        Home Meds - Unchanged    Details   hydrALAZINE 25 MG Oral Tab Take 1 tablet (25 mg total) by mouth every 8 (eight) hours., Normal, Disp-90 tablet, R-1      clopidogrel 75 MG Oral Tab Take 1 tablet (75 mg total) by mouth daily., Normal, Disp-90 tablet, R-1      linaGLIPtin (TRADJENTA) 5 mg Oral Tab Take 1 tablet (5 mg total) by mouth daily., Normal, Disp-90 tablet, R-0Please send a replace/new response with 90-Day Supply if appropriate to maximize member benefit. Requesting 1 year supply.      escitalopram 5 MG Oral Tab Take 1 tablet (5 mg total) by mouth daily., Normal, Disp-90 tablet, R-3      carvedilol 25 MG Oral Tab Take 1 tablet (25 mg total) by mouth 2 (two) times daily., Historical      atorvastatin 40 MG Oral Tab Take 1 tablet (40 mg total) by mouth nightly., Normal, Disp-90 tablet, R-3      apixaban 5 MG Oral Tab Take 1 tablet (5 mg total) by mouth 2 (two) times daily.,  Historical      acetaminophen 500 MG Oral Tab Take 1 tablet (500 mg total) by mouth daily as needed for Pain., Historical      cetirizine 10 MG Oral Tab Take 1 tablet (10 mg total) by mouth every other day., Historical      Cholecalciferol 125 MCG (5000 UT) Oral Tab Take 1 tablet (5,000 Units total) by mouth 2 (two) times daily., Historical      polyethylene glycol, PEG 3350, 17 g Oral Powd Pack 17 g Wed, Thurs, Sat, Historical      tetrahydrozoline 0.05 % Ophthalmic Solution 1 drop 2 (two) times daily as needed., Historical      traMADol 50 MG Oral Tab Take 1 tablet (50 mg total) by mouth every 12 (twelve) hours as needed for Pain., Historical      Glucose Blood (CONTOUR NEXT TEST) In Vitro Strip Test 3 times daily, Normal, Disp-300 each, R-1Type 2 diabetes mellitus with both eyes affected by proliferative retinopathy and 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 YOUR PATIENT HAS REQUESTED A REFILL OF THIS MEDICATION, PREVIOUSLY AUTHORIZED BY ANOTHER PRESCRIBER.      fluticasone propionate 50 MCG/ACT Nasal Suspension 2 sprays by Nasal route daily., Normal, Disp-48 g, R-3      pantoprazole 40 MG Oral Tab EC Take 1 tablet (40 mg total) by mouth every morning before breakfast., Normal, Disp-90 tablet, R-3      methocarbamol 500 MG Oral Tab Take 1 tablet (500 mg total) by mouth 2 (two) times daily as needed., Normal, Disp-60 tablet, R-1      Insulin Pen Needle (PEN NEEDLES) 32G X 4 MM Does not apply Misc 1 each daily., Normal, Disp-100 each, R-0      albuterol (PROAIR HFA) 108 (90 Base) MCG/ACT Inhalation Aero Soln Inhale 2 puffs into the lungs every 4 (four) hours as needed for Wheezing., Normal, Disp-3 each, R-3      Budesonide-Formoterol Fumarate (SYMBICORT) 160-4.5 MCG/ACT Inhalation Aerosol  Inhale 2 puffs into the lungs 2 (two) times daily., Normal, Disp-3 each, R-3      Darbepoetin Randell 60 MCG/ML Injection Solution Inject into the vein as needed., Historical                 Discharge Diet: Diabetic diet and Renal diet    Discharge Activity: As tolerated    Follow up:      Follow-up Information       Wili Parmar MD. Schedule an appointment as soon as possible for a visit in 1 week(s).    Specialty: Internal Medicine  Why: Posthospitalization follow-up in 1-2 weeks  Contact information:  [address] [place] [id number]  [phone]                             Follow up Labs: Repeat CBC in the outpatient setting        Other Discharge Instructions:         Here in the hospital due to rectal bleeding that was persistent.  Evaluated by gastroenterology and underwent endoscopy 6/20 which showed small area of bleeding that required intervention with gastroenterology.  We carefully monitored your blood counts in setting of Plavix and Eliquis  - Cardiology following in the hospitalization for which we need to resume Plavix and Eliquis to protect the stent from earlier this year  - Monitor for any further bleeding episodes    You were maintained on hemodialysis schedule while in the hospital    There was a slight drop in your hemoglobin this morning, ideally we would have checked another blood test before discharge.  But this can be checked again in the next few days.  Be sure to monitor for any bleeding episodes, shortness of breath, chest pain that is worsening and return to the emergency department if needed    Follow-up in primary care clinic in 1-2 weeks        I spent > 30 minutes in the coordination of care    Wili Parmar MD  6/6/2024

## 2024-06-07 NOTE — PROGRESS NOTES
LM for pt to call [facility] for condition update since discharge. [facility] phone number was provided for pt to call back.

## 2024-06-07 NOTE — PROGRESS NOTES
Attempted to reach the patient to complete a Hospital follow up call. Left a message for the patient to call the nurse care manager back at, [phone].

## 2024-06-09 RX ORDER — LINAGLIPTIN 5 MG/1
5 TABLET, FILM COATED ORAL DAILY
Qty: 90 TABLET | Refills: 1 | Status: SHIPPED | OUTPATIENT
Start: 2024-06-09

## 2024-06-13 ENCOUNTER — HOSPITAL ENCOUNTER (INPATIENT)
Facility: HOSPITAL | Age: 79
LOS: 5 days | Discharge: HOME | DRG: 377 | End: 2024-06-18
Attending: EMERGENCY MEDICINE | Admitting: INTERNAL MEDICINE

## 2024-06-13 ENCOUNTER — PATIENT MESSAGE (OUTPATIENT)
Dept: INTERNAL MEDICINE CLINIC | Facility: CLINIC | Age: 79
End: 2024-06-13

## 2024-06-13 DIAGNOSIS — Z79.01 ANTICOAGULATED: ICD-10-CM

## 2024-06-13 DIAGNOSIS — Z99.2 ESRD (END STAGE RENAL DISEASE) ON DIALYSIS (HCC): ICD-10-CM

## 2024-06-13 DIAGNOSIS — N18.6 ESRD (END STAGE RENAL DISEASE) ON DIALYSIS (HCC): ICD-10-CM

## 2024-06-13 DIAGNOSIS — Z79.4 TYPE 2 DIABETES MELLITUS WITH CHRONIC KIDNEY DISEASE ON CHRONIC DIALYSIS, WITH LONG-TERM CURRENT USE OF INSULIN (HCC): ICD-10-CM

## 2024-06-13 DIAGNOSIS — N18.6 TYPE 2 DIABETES MELLITUS WITH CHRONIC KIDNEY DISEASE ON CHRONIC DIALYSIS, WITH LONG-TERM CURRENT USE OF INSULIN (HCC): ICD-10-CM

## 2024-06-13 DIAGNOSIS — K62.5 RECTAL BLEEDING: Primary | ICD-10-CM

## 2024-06-13 DIAGNOSIS — Z79.02 ANTIPLATELET OR ANTITHROMBOTIC LONG-TERM USE: ICD-10-CM

## 2024-06-13 DIAGNOSIS — Z99.2 TYPE 2 DIABETES MELLITUS WITH CHRONIC KIDNEY DISEASE ON CHRONIC DIALYSIS, WITH LONG-TERM CURRENT USE OF INSULIN (HCC): ICD-10-CM

## 2024-06-13 DIAGNOSIS — E11.22 TYPE 2 DIABETES MELLITUS WITH CHRONIC KIDNEY DISEASE ON CHRONIC DIALYSIS, WITH LONG-TERM CURRENT USE OF INSULIN (HCC): ICD-10-CM

## 2024-06-13 LAB
ALBUMIN SERPL-MCNC: 3.8 G/DL (ref 3.2–4.8)
ALBUMIN/GLOB SERPL: 1.3 {RATIO} (ref 1–2)
ALP LIVER SERPL-CCNC: 57 U/L
ALT SERPL-CCNC: 27 U/L
ANION GAP SERPL CALC-SCNC: 6 MMOL/L (ref 0–18)
ANTIBODY SCREEN: NEGATIVE
AST SERPL-CCNC: 25 U/L (ref ?–34)
BASOPHILS # BLD AUTO: 0.04 X10(3) UL (ref 0–0.2)
BASOPHILS # BLD AUTO: 0.06 X10(3) UL (ref 0–0.2)
BASOPHILS NFR BLD AUTO: 0.6 %
BASOPHILS NFR BLD AUTO: 1.1 %
BILIRUB SERPL-MCNC: 0.3 MG/DL (ref 0.2–1.1)
BUN BLD-MCNC: 43 MG/DL (ref 9–23)
BUN/CREAT SERPL: 9.1 (ref 10–20)
CALCIUM BLD-MCNC: 9.5 MG/DL (ref 8.7–10.4)
CHLORIDE SERPL-SCNC: 107 MMOL/L (ref 98–112)
CO2 SERPL-SCNC: 32 MMOL/L (ref 21–32)
CREAT BLD-MCNC: 4.74 MG/DL
DEPRECATED RDW RBC AUTO: 63.5 FL (ref 35.1–46.3)
DEPRECATED RDW RBC AUTO: 63.8 FL (ref 35.1–46.3)
EGFRCR SERPLBLD CKD-EPI 2021: 12 ML/MIN/1.73M2 (ref 60–?)
EOSINOPHIL # BLD AUTO: 0.12 X10(3) UL (ref 0–0.7)
EOSINOPHIL # BLD AUTO: 0.14 X10(3) UL (ref 0–0.7)
EOSINOPHIL NFR BLD AUTO: 2.2 %
EOSINOPHIL NFR BLD AUTO: 2.2 %
ERYTHROCYTE [DISTWIDTH] IN BLOOD BY AUTOMATED COUNT: 18.2 % (ref 11–15)
ERYTHROCYTE [DISTWIDTH] IN BLOOD BY AUTOMATED COUNT: 18.3 % (ref 11–15)
GLOBULIN PLAS-MCNC: 2.9 G/DL (ref 2–3.5)
GLUCOSE BLD-MCNC: 127 MG/DL (ref 70–99)
GLUCOSE BLDC GLUCOMTR-MCNC: 212 MG/DL (ref 70–99)
GLUCOSE BLDC GLUCOMTR-MCNC: 99 MG/DL (ref 70–99)
HCT VFR BLD AUTO: 29.6 %
HCT VFR BLD AUTO: 30.1 %
HGB BLD-MCNC: 9.4 G/DL
HGB BLD-MCNC: 9.5 G/DL
IMM GRANULOCYTES # BLD AUTO: 0.03 X10(3) UL (ref 0–1)
IMM GRANULOCYTES # BLD AUTO: 0.04 X10(3) UL (ref 0–1)
IMM GRANULOCYTES NFR BLD: 0.5 %
IMM GRANULOCYTES NFR BLD: 0.7 %
LYMPHOCYTES # BLD AUTO: 1.05 X10(3) UL (ref 1–4)
LYMPHOCYTES # BLD AUTO: 1.44 X10(3) UL (ref 1–4)
LYMPHOCYTES NFR BLD AUTO: 19.3 %
LYMPHOCYTES NFR BLD AUTO: 23 %
MCH RBC QN AUTO: 30.2 PG (ref 26–34)
MCH RBC QN AUTO: 30.7 PG (ref 26–34)
MCHC RBC AUTO-ENTMCNC: 31.2 G/DL (ref 31–37)
MCHC RBC AUTO-ENTMCNC: 32.1 G/DL (ref 31–37)
MCV RBC AUTO: 95.8 FL
MCV RBC AUTO: 96.8 FL
MONOCYTES # BLD AUTO: 0.51 X10(3) UL (ref 0.1–1)
MONOCYTES # BLD AUTO: 0.56 X10(3) UL (ref 0.1–1)
MONOCYTES NFR BLD AUTO: 9 %
MONOCYTES NFR BLD AUTO: 9.4 %
NEUTROPHILS # BLD AUTO: 3.66 X10 (3) UL (ref 1.5–7.7)
NEUTROPHILS # BLD AUTO: 3.66 X10(3) UL (ref 1.5–7.7)
NEUTROPHILS # BLD AUTO: 4.04 X10 (3) UL (ref 1.5–7.7)
NEUTROPHILS # BLD AUTO: 4.04 X10(3) UL (ref 1.5–7.7)
NEUTROPHILS NFR BLD AUTO: 64.7 %
NEUTROPHILS NFR BLD AUTO: 67.3 %
OSMOLALITY SERPL CALC.SUM OF ELEC: 312 MOSM/KG (ref 275–295)
PLATELET # BLD AUTO: 205 10(3)UL (ref 150–450)
PLATELET # BLD AUTO: 229 10(3)UL (ref 150–450)
POTASSIUM SERPL-SCNC: 4 MMOL/L (ref 3.5–5.1)
PROT SERPL-MCNC: 6.7 G/DL (ref 5.7–8.2)
RBC # BLD AUTO: 3.09 X10(6)UL
RBC # BLD AUTO: 3.11 X10(6)UL
RH BLOOD TYPE: POSITIVE
SODIUM SERPL-SCNC: 145 MMOL/L (ref 136–145)
WBC # BLD AUTO: 5.4 X10(3) UL (ref 4–11)
WBC # BLD AUTO: 6.3 X10(3) UL (ref 4–11)

## 2024-06-13 PROCEDURE — 99283 EMERGENCY DEPT VISIT LOW MDM: CPT | Performed by: INTERNAL MEDICINE

## 2024-06-13 RX ORDER — FLUTICASONE FUROATE AND VILANTEROL 200; 25 UG/1; UG/1
1 POWDER RESPIRATORY (INHALATION) DAILY
Status: DISCONTINUED | OUTPATIENT
Start: 2024-06-13 | End: 2024-06-18

## 2024-06-13 RX ORDER — PANTOPRAZOLE SODIUM 40 MG/1
40 TABLET, DELAYED RELEASE ORAL
Status: DISCONTINUED | OUTPATIENT
Start: 2024-06-14 | End: 2024-06-18

## 2024-06-13 RX ORDER — BISACODYL 10 MG
10 SUPPOSITORY, RECTAL RECTAL
Status: DISCONTINUED | OUTPATIENT
Start: 2024-06-13 | End: 2024-06-18

## 2024-06-13 RX ORDER — ALBUMIN (HUMAN) 12.5 G/50ML
25 SOLUTION INTRAVENOUS
Status: ACTIVE | OUTPATIENT
Start: 2024-06-14 | End: 2024-06-15

## 2024-06-13 RX ORDER — POLYETHYLENE GLYCOL 3350 17 G/17G
17 POWDER, FOR SOLUTION ORAL DAILY PRN
Status: DISCONTINUED | OUTPATIENT
Start: 2024-06-13 | End: 2024-06-18

## 2024-06-13 RX ORDER — CLOPIDOGREL BISULFATE 75 MG/1
75 TABLET ORAL DAILY
Status: DISCONTINUED | OUTPATIENT
Start: 2024-06-14 | End: 2024-06-18

## 2024-06-13 RX ORDER — NICOTINE POLACRILEX 4 MG
30 LOZENGE BUCCAL
Status: DISCONTINUED | OUTPATIENT
Start: 2024-06-13 | End: 2024-06-18

## 2024-06-13 RX ORDER — NICOTINE POLACRILEX 4 MG
15 LOZENGE BUCCAL
Status: DISCONTINUED | OUTPATIENT
Start: 2024-06-13 | End: 2024-06-18

## 2024-06-13 RX ORDER — CETIRIZINE HYDROCHLORIDE 5 MG/1
5 TABLET ORAL EVERY OTHER DAY
Status: DISCONTINUED | OUTPATIENT
Start: 2024-06-13 | End: 2024-06-18

## 2024-06-13 RX ORDER — ASPIRIN 81 MG/1
81 TABLET ORAL DAILY
Status: DISCONTINUED | OUTPATIENT
Start: 2024-06-14 | End: 2024-06-18

## 2024-06-13 RX ORDER — ONDANSETRON 2 MG/ML
4 INJECTION INTRAMUSCULAR; INTRAVENOUS EVERY 6 HOURS PRN
Status: DISCONTINUED | OUTPATIENT
Start: 2024-06-13 | End: 2024-06-18

## 2024-06-13 RX ORDER — SENNOSIDES 8.6 MG
17.2 TABLET ORAL NIGHTLY PRN
Status: DISCONTINUED | OUTPATIENT
Start: 2024-06-13 | End: 2024-06-18

## 2024-06-13 RX ORDER — FLUTICASONE PROPIONATE 50 MCG
2 SPRAY, SUSPENSION (ML) NASAL DAILY
Status: DISCONTINUED | OUTPATIENT
Start: 2024-06-13 | End: 2024-06-18

## 2024-06-13 RX ORDER — TRAMADOL HYDROCHLORIDE 50 MG/1
50 TABLET ORAL EVERY 12 HOURS PRN
Status: DISCONTINUED | OUTPATIENT
Start: 2024-06-13 | End: 2024-06-18

## 2024-06-13 RX ORDER — METHOCARBAMOL 500 MG/1
500 TABLET, FILM COATED ORAL 2 TIMES DAILY PRN
Status: DISCONTINUED | OUTPATIENT
Start: 2024-06-13 | End: 2024-06-18

## 2024-06-13 RX ORDER — ESCITALOPRAM OXALATE 5 MG/1
5 TABLET ORAL DAILY
Status: DISCONTINUED | OUTPATIENT
Start: 2024-06-13 | End: 2024-06-18

## 2024-06-13 RX ORDER — ATORVASTATIN CALCIUM 40 MG/1
40 TABLET, FILM COATED ORAL NIGHTLY
Status: DISCONTINUED | OUTPATIENT
Start: 2024-06-13 | End: 2024-06-18

## 2024-06-13 RX ORDER — CARVEDILOL 25 MG/1
25 TABLET ORAL 2 TIMES DAILY
Status: DISCONTINUED | OUTPATIENT
Start: 2024-06-13 | End: 2024-06-18

## 2024-06-13 RX ORDER — MELATONIN
3 NIGHTLY PRN
Status: DISCONTINUED | OUTPATIENT
Start: 2024-06-13 | End: 2024-06-18

## 2024-06-13 RX ORDER — ACETAMINOPHEN 500 MG
500 TABLET ORAL EVERY 4 HOURS PRN
Status: DISCONTINUED | OUTPATIENT
Start: 2024-06-13 | End: 2024-06-18

## 2024-06-13 RX ORDER — DEXTROSE MONOHYDRATE 25 G/50ML
50 INJECTION, SOLUTION INTRAVENOUS
Status: DISCONTINUED | OUTPATIENT
Start: 2024-06-13 | End: 2024-06-18

## 2024-06-13 RX ORDER — HYDRALAZINE HYDROCHLORIDE 25 MG/1
25 TABLET, FILM COATED ORAL EVERY 8 HOURS SCHEDULED
Status: DISCONTINUED | OUTPATIENT
Start: 2024-06-13 | End: 2024-06-18

## 2024-06-13 RX ORDER — TETRAHYDROZOLINE HCL 0.05 %
1 DROPS OPHTHALMIC (EYE) 2 TIMES DAILY PRN
Status: DISCONTINUED | OUTPATIENT
Start: 2024-06-13 | End: 2024-06-18

## 2024-06-13 RX ORDER — ALBUTEROL SULFATE 90 UG/1
2 AEROSOL, METERED RESPIRATORY (INHALATION) EVERY 4 HOURS PRN
Status: DISCONTINUED | OUTPATIENT
Start: 2024-06-13 | End: 2024-06-18

## 2024-06-13 RX ORDER — METOCLOPRAMIDE HYDROCHLORIDE 5 MG/ML
5 INJECTION INTRAMUSCULAR; INTRAVENOUS EVERY 8 HOURS PRN
Status: DISCONTINUED | OUTPATIENT
Start: 2024-06-13 | End: 2024-06-18

## 2024-06-13 NOTE — ED INITIAL ASSESSMENT (HPI)
Patient to ED c.o rectal bleeding just bright red blood that restarted this morning, put a cardiac stent in on the 1st of this month and started him on eliquis and continued his plavix, he had GI bleed while inpatient here they clipped the area where he had bleeding at that time. No lightheaded or dizziness no new onset pain.

## 2024-06-13 NOTE — PLAN OF CARE
Patient A&Ox4. Received patient from ED, patient is having rectal bleeding. Monitoring vital signs. Monitoring blood glucose levels per order. Has CGM but signed form about our protocol. Has dialysis MWF, notified [facility]. Room air. Clear nectar thick diet. Voiding freely. SCDs for DVT prophylaxis. Up independently. Fall precautions maintained- bed alarm on, bed locked in lowest position, call light and personal belongings within reach, non-skid socks in place to bilateral feet. Frequent rounding by nursing staff. Plan obs.      Problem: Patient Centered Care  Goal: Patient preferences are identified and integrated in the patient's plan of care  Description: Interventions:  - What would you like us to know as we care for you? Home with spouse  - Provide timely, complete, and accurate information to patient/family  - Incorporate patient and family knowledge, values, beliefs, and cultural backgrounds into the planning and delivery of care  - Encourage patient/family to participate in care and decision-making at the level they choose  - Honor patient and family perspectives and choices  Outcome: Progressing     Problem: Diabetes/Glucose Control  Goal: Glucose maintained within prescribed range  Description: INTERVENTIONS:  - Monitor Blood Glucose as ordered  - Assess for signs and symptoms of hyperglycemia and hypoglycemia  - Administer ordered medications to maintain glucose within target range  - Assess barriers to adequate nutritional intake and initiate nutrition consult as needed  - Instruct patient on self management of diabetes  Outcome: Progressing    Goal: Patient/Family Short Term Goal  Description: Patient's Short Term Goal: home 1-2 days  Interventions:   - free from rectal bleeding  -vs/labs wnl  -pain control  - See additional Care Plan goals for specific interventions  Outcome: Progressing     Problem: PAIN - ADULT  Goal: Verbalizes/displays adequate comfort level or patient's stated pain  goal  Description: INTERVENTIONS:  - Encourage pt to monitor pain and request assistance  - Assess pain using appropriate pain scale  - Administer analgesics based on type and severity of pain and evaluate response  - Implement non-pharmacological measures as appropriate and evaluate response  - Consider cultural and social influences on pain and pain management  - Manage/alleviate anxiety  - Utilize distraction and/or relaxation techniques  - Monitor for opioid side effects  - Notify MD/LIP if interventions unsuccessful or patient reports new pain  - Anticipate increased pain with activity and pre-medicate as appropriate  Outcome: Progressing     Problem: RISK FOR INFECTION - ADULT  Goal: Absence of fever/infection during anticipated neutropenic period  Description: INTERVENTIONS  - Monitor WBC  - Administer growth factors as ordered  - Implement neutropenic guidelines  Outcome: Progressing     Problem: SAFETY ADULT - FALL  Goal: Free from fall injury  Description: INTERVENTIONS:  - Assess pt frequently for physical needs  - Identify cognitive and physical deficits and behaviors that affect risk of falls.  - [place] fall precautions as indicated by assessment.  - Educate pt/family on patient safety including physical limitations  - Instruct pt to call for assistance with activity based on assessment  - Modify environment to reduce risk of injury  - Provide assistive devices as appropriate  - Consider OT/PT consult to assist with strengthening/mobility  - Encourage toileting schedule  Outcome: Progressing     Problem: DISCHARGE PLANNING  Goal: Discharge to home or other facility with appropriate resources  Description: INTERVENTIONS:  - Identify barriers to discharge w/pt and caregiver  - Include patient/family/discharge partner in discharge planning  - Arrange for needed discharge resources and transportation as appropriate  - Identify discharge learning needs (meds, wound care, etc)  - Arrange for interpreters  to assist at discharge as needed  - Consider post-discharge preferences of patient/family/discharge partner  - Complete POLST form as appropriate  - Assess patient's ability to be responsible for managing their own health  - Refer to Case Management Department for coordinating discharge planning if the patient needs post-hospital services based on physician/LIP order or complex needs related to functional status, cognitive ability or social support system  Outcome: Progressing

## 2024-06-13 NOTE — ED PROVIDER NOTES
Patient Seen in: [facility]    History     Chief Complaint   Patient presents with    GI Bleeding     Stated Complaint: Blood in stool     HPI    80 yo CAD s/p PCI 5/21/2024 on clopidogrel, pAF on eliquis, ESRD (MWF via RUE AVF), HTN presneting with recurrence of BRBPR x2 stool this morning; of note, patient s/p 6/3/2024 endoscopy demonstrating proximal transverse/hepatic flexure 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 No pain. No straining/constipation.    Past Medical History:    Anemia    Asthma (HCC)    Back problem    BPH (benign prostatic hyperplasia)    Calculus of kidney    Cataract    Diabetes (HCC)    ESRD (end stage renal disease) on dialysis (HCC)    Essential hypertension    High blood pressure    High cholesterol    History of blood transfusion    Hyperlipidemia    Neuropathy    hands and feet    KRAIG on CPAP    Renal disorder    Sleep apnea    Visual impairment    glasses    Vocal cord paralysis, unilateral partial       Past Surgical History:   Procedure Laterality Date    Appendectomy      1981    Back surgery      Neck/back - 1998    Capsule endoscopy - internal referral      Cataract      12/2021 and 1/2022    Colonoscopy      Colonoscopy N/A 1/25/2021    Procedure: COLONOSCOPY;  Surgeon: Michael Gautam MD;  Location: [facility] ENDO    Colonoscopy N/A 6/3/2024    Procedure: COLONOSCOPY;  Surgeon: Gabriel Saldana MD;  Location: [facility] ENDOSCOPY    Hand/finger surgery unlisted      Accidental trauma    Upper gi endoscopy,diagnosis              Family History   Problem Relation Age of Onset    Cancer Father         Kidney    Breast Cancer Mother     Diabetes Mother     Diabetes Maternal Grandmother     Diabetes Maternal Grandfather     Heart Disorder Other         Uncle       Social History     Socioeconomic History    Marital status:    Tobacco Use    Smoking status: Former     Current packs/day: 0.00     Average packs/day: 1  pack/day for 17.0 years (17.0 ttl pk-yrs)     Types: Cigarettes     Start date: 1964     Quit date: 1981     Years since quittin.4    Smokeless tobacco: Never   Vaping Use    Vaping status: Never Used   Substance and Sexual Activity    Alcohol use: No     Alcohol/week: 0.0 standard drinks of alcohol    Drug use: No    Sexual activity: Yes     Partners: Female     Social Determinants of Health     Financial Resource Strain: Low Risk  (2024)    Financial Resource Strain     Difficulty of Paying Living Expenses: Not hard at all     Med Affordability: No   Food Insecurity: No Food Insecurity (2024)    Food Insecurity     Food Insecurity: Never true   Transportation Needs: No Transportation Needs (2024)    Transportation Needs     Lack of Transportation: No   Housing Stability: Low Risk  (2024)    Housing Stability     Housing Instability: No       Review of Systems :  Constitutional: As per HPI  Respiratory: Negative for cough and shortness of breath.    Cardiovascular: Negative for chest pain and palpitations.   Gastrointestinal: (+) rectal bleeding.    Positive for stated complaint: Blood in stool  Other systems are as noted in HPI.  Constitutional and vital signs reviewed.      All other systems reviewed and negative except as noted above.    PSFH elements reviewed from today and agreed except as otherwise stated in HPI.    Physical Exam     ED Triage Vitals [24 0732]   /59   Pulse 69   Resp 18   Temp 98.6 °F (37 °C)   Temp src Oral   SpO2 97 %   O2 Device None (Room air)       Current:/59   Pulse 69   Temp 98.6 °F (37 °C) (Oral)   Resp 18   Ht 162.6 cm (5' 4\")   Wt 72.6 kg   SpO2 97%   BMI 27.46 kg/m²         Physical Exam   Constitutional: No distress.   HEENT: MMM.  Head: Normocephalic.   Eyes: No injection.   Cardiovascular: Regular rate.   Pulmonary/Chest: Effort normal. CTAB.  Abdominal: Soft. Nontender. CARLOS without gross melena, blood in vault without  active bleeding.  Musculoskeletal: No gross deformity.  Neurological: Alert.   Skin: Skin is warm.   Psychiatric: Cooperative.  Nursing note and vitals reviewed.        ED Course     Labs Reviewed   COMP METABOLIC PANEL (14) - Abnormal; Notable for the following components:       Result Value    Glucose 127 (*)     BUN 43 (*)     Creatinine 4.74 (*)     BUN/CREA Ratio 9.1 (*)     Calculated Osmolality 312 (*)     eGFR-Cr 12 (*)     All other components within normal limits   CBC W/ DIFFERENTIAL - Abnormal; Notable for the following components:    RBC 3.11 (*)     HGB 9.4 (*)     HCT 30.1 (*)     RDW-SD 63.8 (*)     RDW 18.2 (*)     All other components within normal limits   CBC WITH DIFFERENTIAL WITH PLATELET    Narrative:     The following orders were created for panel order CBC With Differential With Platelet.  Procedure                               Abnormality         Status                     ---------                               -----------         ------                     CBC W/ DIFFERENTIAL[[id number]]          Abnormal            Final result                 Please view results for these tests on the individual orders.   CBC WITH DIFFERENTIAL WITH PLATELET   TYPE AND SCREEN    Narrative:     The following orders were created for panel order Type and screen.  Procedure                               Abnormality         Status                     ---------                               -----------         ------                     ABORH (Blood Type)[[id number]]                               Final result               Antibody Screen[[id number]]                                  Final result                 Please view results for these tests on the individual orders.   ABORH (BLOOD TYPE)   ANTIBODY SCREEN   RAINBOW DRAW GOLD   RAINBOW DRAW BLUE     6/3/2024 endoscopy:  Post-Operative Diagnosis:   1.  Normal esophagus except for small hiatal hernia.    2.  Minimal gastritis, otherwise normal gastric  examination.   3.  Normal duodenum  4.  There was a small hepatic flexure vs proximal transverse colon bleeding lesion (1mm in size)- AVM vs dieulafoy's lesion that was actively bleeding.  This was cauterized using APC.  This site was clipped X 1.    5.  There was an ascending colon sessile polyp, 8mm, fully removed with hot cautery snare.    6.  No other colon abnormalities.   7.  Normal terminal ileum  8.  A couple of small mouth left diverticuli.   9.  Small internal hemorrhoids       MDM   DIFFERENTIAL DIAGNOSIS: After history and physical exam differential diagnosis includes but is not limited to rectal bleeding, anticoagulated state, bleeding dyscrasia, functional thrombocytopenia.    Pulse ox: 97%:Normal on RA, as independently interpreted by myself    Cardiac Monitor Interpretation:   Pulse Readings from Last 1 Encounters:   06/13/24 69   , sinus,      Medical Decision Making  Evaluation for rectal bleeding in the setting of PCI on DAPT in addition to DOAC therapy in setting of paroxysmal atrial fibrillation; CARLOS with bloody stool noted without active bleeding and hemodynamically stable patient with history of ESRD and likely uremic contribution to GI bleed.  Type and screen sent, does not present initiated and will admit for ongoing monitoring/management with consideration for endoscopic evaluation.  Case discussed with primary care Dr. Parmar for admission, GI Dr. Storey in consultation in addition to Dr. Martinez in nephrology and Dr. Julio in cardiology consultation.    Problems Addressed:  Anticoagulated: chronic illness or injury with exacerbation, progression, or side effects of treatment  Antiplatelet or antithrombotic long-term use: chronic illness or injury with exacerbation, progression, or side effects of treatment  ESRD (end stage renal disease) on dialysis (HCC): chronic illness or injury  Rectal bleeding: acute illness or injury    Amount and/or Complexity of Data Reviewed  Labs: ordered.  Decision-making details documented in ED Course.  ECG/medicine tests: ordered and independent interpretation performed. Decision-making details documented in ED Course.  Discussion of management or test interpretation with external provider(s): Case discussed with primary care Dr. Parmar for admission, GI Dr. Storey in consultation in addition to Dr. Martinez in nephrology and Dr. Julio in cardiology consultation.    Risk  Prescription drug management.  Decision regarding hospitalization.        I was wearing at minimum a facemask and eye protection throughout this encounter with handwashing performed prior and after patient evaluation without personal hand/facial/oropharyngeal contact and gloves worn throughout encounter. See note and/or contact this provider for further PPE details.    Disposition and Plan     Clinical Impression:  1. Rectal bleeding    2. ESRD (end stage renal disease) on dialysis (HCC)    3. Anticoagulated    4. Antiplatelet or antithrombotic long-term use        Disposition:  Admit    Follow-up:  No follow-up provider specified.    Medications Prescribed:  Current Discharge Medication List

## 2024-06-13 NOTE — CONSULTS
[facility]  part of [facility]    Report of Consultation    Ollie Hernández Patient Status:  Emergency    1945 MRN [id number]   Location [facility] EMERGENCY DEPARTMENT Attending Antonio Nunes MD   Hosp Day # 0 PCP Wili Parmar MD     Date of Admission:  2024  Date of Consult:  2024  Reason for Consultation:   hematochezia    History of Present Illness:   Patient is a 79 year old male with hx sig for  CAD s/p PCI 2024 on clopidogrel, pAF on eliquis, DM, ESRD (MWF via RUE AVF), HTN who was admitted to the hospital for Rectal bleeding, states he has a lot of gas, had bm this am with blood streaking stool, no clots, no syncope.  Was told to look for blood and that he is the reason he is here.    He denies chest pain, n/v, abdominal pain, diarrhea, melena, f/c    Past Medical History  Past Medical History:    Anemia    Asthma (HCC)    Back problem    BPH (benign prostatic hyperplasia)    Calculus of kidney    Cataract    Diabetes (HCC)    ESRD (end stage renal disease) on dialysis (HCC)    Essential hypertension    High blood pressure    High cholesterol    History of blood transfusion    Hyperlipidemia    Neuropathy    hands and feet    KRAIG on CPAP    Renal disorder    Sleep apnea    Visual impairment    glasses    Vocal cord paralysis, unilateral partial     Past Surgical History  Past Surgical History:   Procedure Laterality Date    Appendectomy          Back surgery      Neck/back -     Capsule endoscopy - internal referral      Cataract      2021 and 2022    Colonoscopy      Colonoscopy N/A 2021    Procedure: COLONOSCOPY;  Surgeon: Michael Gautam MD;  Location: [facility] ENDO    Colonoscopy N/A 6/3/2024    Procedure: COLONOSCOPY;  Surgeon: Gabriel Saldana MD;  Location: [facility] ENDOSCOPY    Hand/finger surgery unlisted      Accidental trauma    Upper gi endoscopy,diagnosis       Family History  Family History   Problem Relation Age of Onset     Cancer Father         Kidney    Breast Cancer Mother     Diabetes Mother     Diabetes Maternal Grandmother     Diabetes Maternal Grandfather     Heart Disorder Other         Uncle     Social History  Social History     Socioeconomic History    Marital status:    Tobacco Use    Smoking status: Former     Current packs/day: 0.00     Average packs/day: 1 pack/day for 17.0 years (17.0 ttl pk-yrs)     Types: Cigarettes     Start date: 1964     Quit date: 1981     Years since quittin.4    Smokeless tobacco: Never   Vaping Use    Vaping status: Never Used   Substance and Sexual Activity    Alcohol use: No     Alcohol/week: 0.0 standard drinks of alcohol    Drug use: No    Sexual activity: Yes     Partners: Female     Social Determinants of Health     Financial Resource Strain: Low Risk  (2024)    Financial Resource Strain     Difficulty of Paying Living Expenses: Not hard at all     Med Affordability: No   Food Insecurity: No Food Insecurity (2024)    Food Insecurity     Food Insecurity: Never true   Transportation Needs: No Transportation Needs (2024)    Transportation Needs     Lack of Transportation: No   Housing Stability: Low Risk  (2024)    Housing Stability     Housing Instability: No        Current Medications:  No current facility-administered medications for this encounter.     (Not in a hospital admission)    Allergies  Allergies   Allergen Reactions    Adhesive Tape OTHER (SEE COMMENTS)     Severe rashes    Dust Mite Extract RASH       Review of Systems:     ROS:  No fevers, chills, night sweats. No weight loss nor weight gain.  Denies nausea, vomiting.  Denies diarrhea or constipation, no persistent changes in caliber of stool, no dysphagia, no jaundice, no heartburn, no early satiety, no hematemesis, no melena. Denies abdominal pain.  + hematochezia.    History of obstructive sleep apnea: YES  History of respiratory illness/home oxygen supplementation: No  History of  cardiac illness/pacemaker/AICD/blood thinners: YES - see hpi  History of anxiety/depression: No  History of chronic narcotic pain medication use: No  History of diabetes: YES  History of mobility issues: No  History of C-Diff Colitis:No  History of MRSA: No  History of abdominal surgeries:  yes - appy    ENDOSCOPIC REPORTS:   Colonoscopy:2024, 2021  EGD:2024  ERCP:    History of Prep or Sedation intolerance with previous endoscopic procedures: None    GENERAL: feels well otherwise.  SKIN: denies any unusual skin lesions  EYES: denies any new visual changes or double vision, denies red painful eyes  HEENT: denies any new hearing changes, nasal congestion, sinus pain  LUNGS: denies shortness of breath with exertion or persistent cough  CARDIOVASCULAR: denies chest pain on exertion  GI: as above  : denies dysuria, nocturia or changes in stream  MUSCULOSKELETAL: denies new myalgias, swelling  NEURO: denies new sensory or motor deficits.  PSYCHE: denies depression or anxiety  HEMATOLOGIC: denies bleeding or bruising      Physical Exam:   Blood pressure (!) 185/85, pulse 66, temperature 98.6 °F (37 °C), temperature source Oral, resp. rate 18, height 5' 4\" (1.626 m), weight 160 lb (72.6 kg), SpO2 99%.      General appearance:  alert, appears stated age and cooperative    HEENT: negative findings: lids and lashes normal and conjunctivae and sclerae normal.     Pulmonary: clear to auscultation bilaterally of anterior chest    Cardiovascular: regular rate and rhythm    Abdominal: soft, bowel sounds present; non-distended, non-tender    Extremities: No edema, cyanosis, or clubbing    Skin: warm and dry    Neurologic: Grossly normal    Psychiatric: calm      Results:     Laboratory Data:  Lab Results   Component Value Date    WBC 5.4 06/13/2024    HGB 9.4 (L) 06/13/2024    HCT 30.1 (L) 06/13/2024    .0 06/13/2024    CREATSERUM 4.74 (H) 06/13/2024    BUN 43 (H) 06/13/2024     06/13/2024    K 4.0 06/13/2024    CL  107 06/13/2024    CO2 32.0 06/13/2024     (H) 06/13/2024    CA 9.5 06/13/2024    ALB 3.8 06/13/2024    ALKPHO 57 06/13/2024    TP 6.7 06/13/2024    AST 25 06/13/2024    ALT 27 06/13/2024    T4F 0.9 08/31/2022    TSH 2.060 06/23/2023     (H) 07/25/2023    PSA 2.94 10/20/2021    ESRML 79 (H) 03/16/2016    CRP 0.36 03/16/2016    MG 2.1 11/20/2023    PHOS 3.9 06/06/2024    TROP <0.045 07/25/2019    TROPHS 25 04/25/2022     08/05/2023    B12 631 08/05/2023         Imaging:  No results found.       Impression:   Rectal bleeding   -likely hemorrhoidal and based on streaking stool, lack of lh, reassuring hbg, and high risk of stent thrombus best to observe, recent right sided AVM - treated    -continue to observe    -q12 cbc    -continue anticoagultion    -address hemorrhoids - hydrocortisone suppository as likely internal hemorrhoids in setting of painless bleeding    Constipation   -miralax daily   -no strainign or sitting on toilet for prolonged periods    CAD s/p recent PCI   -defer to cardiology - cardiac risk is greater that bleeding risk at this point based on presentation; will observe and optimize treatment of hemorrhoids    Thank you for allowing me to participate in the care of your patient.    Continue to follow     Time spent in direct patient contact and decision making as well as counseling/coordination of care:  80 minutes    Carlyn Storey MD  6/13/2024

## 2024-06-13 NOTE — CONSULTS
[facility]  part of [facility]    Report of Consultation    Ollie Hernández Patient Status:  Emergency    1945 MRN [id number]   Location [facility] EMERGENCY DEPARTMENT Attending Antonio Nunes MD   Hosp Day # 0 PCP Wili Parmar MD     Date of Admission:  2024  Date of Consult:  2024   Reason for Consultation:   ESRD    History of Present Illness:   Patient is a 79 year old male who was admitted to the hospital for Rectal bleeding:    Mr. Fontaine is known to me from dialysis.  He gets dialysis  and he is compliant with treatments.  Patient was recently admitted with a GI bleed and was found to have an AVM that required cautery and clipping.    He came to the emergency room today with bright red blood per rectum  Past Medical History  Past Medical History:    Anemia    Asthma (HCC)    Back problem    BPH (benign prostatic hyperplasia)    Calculus of kidney    Cataract    Diabetes (HCC)    ESRD (end stage renal disease) on dialysis (HCC)    Essential hypertension    High blood pressure    High cholesterol    History of blood transfusion    Hyperlipidemia    Neuropathy    hands and feet    KRAIG on CPAP    Renal disorder    Sleep apnea    Visual impairment    glasses    Vocal cord paralysis, unilateral partial       Past Surgical History  Past Surgical History:   Procedure Laterality Date    Appendectomy          Back surgery      Neck/back -     Capsule endoscopy - internal referral      Cataract      2021 and 2022    Colonoscopy      Colonoscopy N/A 2021    Procedure: COLONOSCOPY;  Surgeon: Michael Gautam MD;  Location: [facility] ENDO    Colonoscopy N/A 6/3/2024    Procedure: COLONOSCOPY;  Surgeon: Gabriel Saldana MD;  Location: [facility] ENDOSCOPY    Hand/finger surgery unlisted      Accidental trauma    Upper gi endoscopy,diagnosis         Family History  Family History   Problem Relation Age of Onset    Cancer Father         Kidney     Breast Cancer Mother     Diabetes Mother     Diabetes Maternal Grandmother     Diabetes Maternal Grandfather     Heart Disorder Other         Uncle       Social History  Social History     Socioeconomic History    Marital status:    Tobacco Use    Smoking status: Former     Current packs/day: 0.00     Average packs/day: 1 pack/day for 17.0 years (17.0 ttl pk-yrs)     Types: Cigarettes     Start date: 1964     Quit date: 1981     Years since quittin.4    Smokeless tobacco: Never   Vaping Use    Vaping status: Never Used   Substance and Sexual Activity    Alcohol use: No     Alcohol/week: 0.0 standard drinks of alcohol    Drug use: No    Sexual activity: Yes     Partners: Female     Social Determinants of Health     Financial Resource Strain: Low Risk  (2024)    Financial Resource Strain     Difficulty of Paying Living Expenses: Not hard at all     Med Affordability: No   Food Insecurity: No Food Insecurity (2024)    Food Insecurity     Food Insecurity: Never true   Transportation Needs: No Transportation Needs (2024)    Transportation Needs     Lack of Transportation: No   Housing Stability: Low Risk  (2024)    Housing Stability     Housing Instability: No       Current Medications:  No current facility-administered medications for this encounter.     (Not in a hospital admission)      Allergies  Allergies   Allergen Reactions    Adhesive Tape OTHER (SEE COMMENTS)     Severe rashes    Dust Mite Extract RASH       Review of Systems:     General: weak         A comprehensive 12 point review of systems was completed.  Pertinent positives as above and all the rest were negative.     Physical Exam:   BP (!) 185/85   Pulse 66   Temp 98.6 °F (37 °C) (Oral)   Resp 18   Ht 5' 4\" (1.626 m)   Wt 160 lb (72.6 kg)   SpO2 99%   BMI 27.46 kg/m²    No intake or output data in the 24 hours ending 24 1103  Wt Readings from Last 1 Encounters:   24 160 lb (72.6 kg)        Exam  Gen: No acute distress  Heent: NC AT, mucous memb clear, neck supple  Pulm: Lungs clear, normal respiratory effort  CV: Heart with regular rate and rhythm, no edema  Abd: Abdomen soft, nontender, nondistended, no organomegaly, bowel sounds present  Skin: no symptoms reported  Psych: alert and oriented        Results:     Laboratory Data:  Recent Labs   Lab 06/06/24  1152 06/13/24  0849   RBC 2.88* 3.11*   HGB 8.8* 9.4*   HCT 27.0* 30.1*   MCV 93.8 96.8   MCH 30.6 30.2   MCHC 32.6 31.2   RDW 18.0* 18.2*   NEPRELIM  --  3.66   WBC 6.7 5.4   .0 229.0         Recent Labs   Lab 06/13/24  0849   *   BUN 43*   CREATSERUM 4.74*   CA 9.5      K 4.0      CO2 32.0        Imaging:  No results found.            Impression/Receommendations:     1 - ESRD  Plan dialysis tomorrow on his regular day    2 -GI bleed/anemia  I will see the patient.  His hemoglobin is stable for now    3 - HTN w ESRD  He is on hydralazine, felodipine    4 -secondary hyperparathyroidism  Monitor calcium and phosphorus    5 -atrial fibrillation/coronary artery disease    He had his PCI back in May, and he was on aspirin, Plavix, Eliquis.  This will need to be determined with cardiology.    Thank you for allowing me to participate in the care of your patient.    SONDRA MART MD  6/13/2024

## 2024-06-13 NOTE — ED QUICK NOTES
Orders for admission, patient is aware of plan and ready to go upstairs. Any questions, please call ED RN Negar at extension [phone].     Patient Covid vaccination status: Fully vaccinated     COVID Test Ordered in ED: None    COVID Suspicion at Admission: N/A    Running Infusions:  None    Mental Status/LOC at time of transport: A&O x 4    Other pertinent information:   CIWA score: N/A   NIH score:  N/A     20g L AC PIV   R arm AVF

## 2024-06-13 NOTE — RESPIRATORY THERAPY NOTE
KRAIG ASSESSMENT:    Pt does have a previous diagnosis of KRAIG. Pt does routinely use a CPAP device at home.     CPAP INITIATION:    Pt to be placed on CPAP: yes- tonight/ medium  full face mask.

## 2024-06-13 NOTE — ED QUICK NOTES
Pt resting on cart in low/locked position, side rails up x 2, call light w/ in reach.   Pt in NAD, VSS, breathing easy, non-labored.  Pt provided w/ warm blanket per request.

## 2024-06-13 NOTE — ED QUICK NOTES
Pt ambulatory to ED tx room 32 from triage.   Pt A&O x 4, answering all ?s appropriately.   Pt connected to cont ECG, pulse ox & BP.   Pt here w/ c/o: BRBPR.  Pt reporting symptoms started this AM when he had a BM.  Pt w/ hx of prior GI bleeding that was clipped at the beginning of this month.  Pt takes Eliquis and Plavix for prior cardiac stent.  Pt denies any chest pain, LE, n/v/d/c, fevers/chills, dizziness or lightheadedness.  Pt receives HD MWF, last dialyzed yesterday.    Cart in low/locked position, side rails up x 2, call light w/ in reach.

## 2024-06-13 NOTE — TELEPHONE ENCOUNTER
From: Ollie Hernández  To: Wili Parmar  Sent: 6/13/2024 9:07 AM CDT  Subject: Ollie Hernández    Back in ER. Bleeding started again this morning. Thank you, Stephy

## 2024-06-13 NOTE — CONSULTS
Cardiology Consultation  [facility]    Ollie Hernández Patient Status:  Emergency    1945 MRN [id number]   Location [facility] EMERGENCY DEPARTMENT Attending Antonio Nunes MD   Hosp Day # 0 PCP Wili Parmar MD     Reason for Consultation:  Bleeding    History of Present Illness:  Ollie Hernández is a a(n) 79 year old male with coronary artery disease and PCI to circumflex (2024), paroxysmal atrial fibrillation on Eliquis, chronic HFpEF, ESRD on hemodialysis who presents with recurrent rectal bleeding.    His PCI was on 2024.  He was discharged on 1 week of triple therapy with aspirin 81 mg, Plavix, Eliquis.  Patient then stopped aspirin as planned.  He presented to [facility] on 2024 with rectal bleeding for 3 days while on dual antithrombotic therapy with Plavix and Eliquis.  Anticoagulation was held while patient underwent endoscopy, and he had APC of bleeding lesion and removal of a sessile polyp.    Placed back on Plavix plus Eliquis (restarted 24) and discharged.  He felt well without any bleeding until this morning at 7 AM, when he had a large bloody bowel movement with bright red blood.    Hgb 9.4    Assessment/Plan:    ABLA Hgb 11.5 (24) -> 9.8   - Reviewed records: Hgb appears somewhat variable but baseline appears to be 10-11.  - Underwent colonoscopy s/p APC of bleeding lesion and removal of sessile polyp.  CAD - PCI LCX with [place] ABIMBOLA x 1 (24)  - Restarted Eliquis + plavix on 24  - Hgb mildly down today but after dialysis. He has not had any bloody stools.  PAF on Eliquis  NSVT on Zio patch  Chronic HFpEF - compensated  ESRD on HD MWF  DM2  HTN  KRAIG on CPAP  LVEF 60% (echo )    Plan  Risk of life threatening stent thrombosis outweighs risk of life threatening hemorrhage at present.   Restart DAPT given above. Can do DAPT x 1 month as he received a [place] ABIMBOLA (FDA approved for shorter course of DAPT).  Can transition  to ASA + Eliquis after 1 month of DAPT for a total duration of 6 months.  Patient currently hypertensive, continue home meds.    History:  Past Medical History:    Anemia    Asthma (HCC)    Back problem    BPH (benign prostatic hyperplasia)    Calculus of kidney    Cataract    Diabetes (HCC)    ESRD (end stage renal disease) on dialysis (HCC)    Essential hypertension    High blood pressure    High cholesterol    History of blood transfusion    Hyperlipidemia    Neuropathy    hands and feet    KRAIG on CPAP    Renal disorder    Sleep apnea    Visual impairment    glasses    Vocal cord paralysis, unilateral partial     Past Surgical History:   Procedure Laterality Date    Appendectomy      1981    Back surgery      Neck/back - 1998    Capsule endoscopy - internal referral      Cataract      12/2021 and 1/2022    Colonoscopy      Colonoscopy N/A 1/25/2021    Procedure: COLONOSCOPY;  Surgeon: Michael Gautam MD;  Location: [facility] ENDO    Colonoscopy N/A 6/3/2024    Procedure: COLONOSCOPY;  Surgeon: Gabriel Saldana MD;  Location: [facility] ENDOSCOPY    Hand/finger surgery unlisted      Accidental trauma    Upper gi endoscopy,diagnosis       Family History   Problem Relation Age of Onset    Cancer Father         Kidney    Breast Cancer Mother     Diabetes Mother     Diabetes Maternal Grandmother     Diabetes Maternal Grandfather     Heart Disorder Other         Uncle      reports that he quit smoking about 43 years ago. His smoking use included cigarettes. He started smoking about 60 years ago. He has a 17 pack-year smoking history. He has never used smokeless tobacco. He reports that he does not drink alcohol and does not use drugs.    Allergies:  Allergies   Allergen Reactions    Adhesive Tape OTHER (SEE COMMENTS)     Severe rashes    Dust Mite Extract RASH       Medications:  Current Facility-Administered Medications on File Prior to Encounter   Medication Dose Route Frequency Provider Last Rate Last Admin    [COMPLETED]  iron sucrose (Venofer) 300 mg in sodium chloride 0.9% 250 mL IVPB  300 mg Intravenous Daily José Callahan .7 mL/hr at 24 1133 300 mg at 24 1133    [COMPLETED] polyethylene glycol-electrolyte (Golytely) 236 g oral solution 2,000 mL  2,000 mL Oral Once Emilio Talley MD   2,000 mL at 24 0456    [COMPLETED] polyethylene glycol-electrolyte (Golytely) 236 g oral solution 2,000 mL  2,000 mL Oral Once Emilio Talley MD   2,000 mL at 24 1800    [] sodium chloride 0.9 % IV bolus 100 mL  100 mL Intravenous Q30 Min PRN Walker Klein MD        And    [] albumin human (Albumin) 25% injection 25 g  25 g Intravenous PRN Dialysis Walker Klein MD        [COMPLETED] acetaminophen (Tylenol Extra Strength) tab 1,000 mg  1,000 mg Oral Once Devaughn Montiel, DO   1,000 mg at 24 0345    [COMPLETED] sodium chloride 0.9% infusion   Intravenous On Call Luis Orozco MD   Stopped at 24 0000    [COMPLETED] lidocaine PF (Xylocaine-MPF) 2 % injection             [COMPLETED] heparin in sodium chloride 0.9% (Porcine) 2 Units/mL flush bag premix             [COMPLETED] heparin in sodium chloride 0.9% (Porcine) 2 Units/mL flush bag premix             [COMPLETED] fentaNYL (Sublimaze) 50 mcg/mL injection             [COMPLETED] midazolam (Versed) 2 MG/2ML injection             [COMPLETED] heparin (Porcine) 1000 UNIT/ML injection             [COMPLETED] Nitroglycerin in D5W 200-5 MCG/ML-% injection             [COMPLETED] midazolam (Versed) 2 MG/2ML injection             [COMPLETED] iohexol (Omnipaque) 300 MG/ML injection 350 mL  350 mL Intravenous ONCE PRN Luis Orozco MD   230 mL at 24 1601    [] sodium chloride 0.9% infusion   Intravenous Continuous Luis Orozco MD   Stopped at 24 0000     Current Outpatient Medications on File Prior to Encounter   Medication Sig Dispense Refill    linaGLIPtin (TRADJENTA) 5 mg Oral Tab Take 1 tablet (5 mg total) by  mouth daily. 90 tablet 1    hydrALAZINE 25 MG Oral Tab Take 1 tablet (25 mg total) by mouth every 8 (eight) hours. 90 tablet 1    clopidogrel 75 MG Oral Tab Take 1 tablet (75 mg total) by mouth daily. 90 tablet 1    escitalopram 5 MG Oral Tab Take 1 tablet (5 mg total) by mouth daily. 90 tablet 3    carvedilol 25 MG Oral Tab Take 1 tablet (25 mg total) by mouth 2 (two) times daily.      atorvastatin 40 MG Oral Tab Take 1 tablet (40 mg total) by mouth nightly. 90 tablet 3    apixaban 5 MG Oral Tab Take 1 tablet (5 mg total) by mouth 2 (two) times daily.      acetaminophen 500 MG Oral Tab Take 1 tablet (500 mg total) by mouth daily as needed for Pain.      cetirizine 10 MG Oral Tab Take 1 tablet (10 mg total) by mouth every other day.      Cholecalciferol 125 MCG (5000 UT) Oral Tab Take 1 tablet (5,000 Units total) by mouth 2 (two) times daily.      polyethylene glycol, PEG 3350, 17 g Oral Powd Pack 17 g Wed, Thurs, Sat      tetrahydrozoline 0.05 % Ophthalmic Solution 1 drop 2 (two) times daily as needed.      traMADol 50 MG Oral Tab Take 1 tablet (50 mg total) by mouth every 12 (twelve) hours as needed for Pain.      Glucose Blood (CONTOUR NEXT TEST) In Vitro Strip Test 3 times daily 300 each 1    felodipine ER 10 MG Oral Tablet 24 Hr Take 1 tablet (10 mg total) by mouth daily. 90 tablet 3    fluticasone propionate 50 MCG/ACT Nasal Suspension 2 sprays by Nasal route daily. 48 g 3    pantoprazole 40 MG Oral Tab EC Take 1 tablet (40 mg total) by mouth every morning before breakfast. 90 tablet 3    methocarbamol 500 MG Oral Tab Take 1 tablet (500 mg total) by mouth 2 (two) times daily as needed. 60 tablet 1    Insulin Pen Needle (PEN NEEDLES) 32G X 4 MM Does not apply Misc 1 each daily. 100 each 0    albuterol (PROAIR HFA) 108 (90 Base) MCG/ACT Inhalation Aero Soln Inhale 2 puffs into the lungs every 4 (four) hours as needed for Wheezing. 3 each 3    Budesonide-Formoterol Fumarate (SYMBICORT) 160-4.5 MCG/ACT Inhalation  Aerosol Inhale 2 puffs into the lungs 2 (two) times daily. (Patient taking differently: Inhale 2 puffs into the lungs 2 (two) times daily as needed.) 3 each 3    Darbepoetin Randell 60 MCG/ML Injection Solution Inject into the vein as needed.         Review of Systems:  Constitutional: denies fevers, chills, night sweats  HEENT: denies headache, vision changes, trouble or pain with swallowing  Cardiac: denies chest pain, palpitations, edema  Pulm: denies dyspnea, cough, wheeze  GI: denies n/v, abd pain, diarrhea or constipation  : denies hematuria, dysuria, incontinence  MSK: denies muscle or joint pains  Neuro: denies numbness, weakness, paresthesias  Psych: denies anxiety, depression  Integument: denies skin rashes or lesions  Heme: denies easy bruising or bleeding  Endo: denies heat/cold intolerance, skin or nail changes      Physical Exam:  Blood pressure (!) 185/85, pulse 66, temperature 98.6 °F (37 °C), temperature source Oral, resp. rate 18, height 5' 4\" (1.626 m), weight 160 lb (72.6 kg), SpO2 99%.  Wt Readings from Last 3 Encounters:   06/13/24 160 lb (72.6 kg)   06/05/24 150 lb 4.8 oz (68.2 kg)   05/28/24 163 lb 9.6 oz (74.2 kg)       General: awake, alert, oriented x 3, no acute distress  HEENT: at/nc, perrl, eomi  Neck: No JVD, carotids 2+ no bruits.  Cardiac: Regular rate and rhythm, S1, S2 normal, no murmur, rub or gallop.  Lungs: Clear without wheezes, rales, rhonchi or dullness.  Normal excursions and effort.  Abdomen: Soft, non-tender, non-distended, normal bowel sounds   Extremities: Without clubbing, cyanosis or edema.  Peripheral pulses are 2+.  Neurologic: Alert and oriented, normal affect.  Psych: normal mood and affect  Skin: Warm and dry.       Laboratories and Data:  Diagnostics:      Labs:   CBC:    Lab Results   Component Value Date    WBC 5.4 06/13/2024    WBC 6.7 06/06/2024    WBC 6.7 06/06/2024     Lab Results   Component Value Date    HGB 9.4 (L) 06/13/2024    HGB 8.8 (L) 06/06/2024     HGB 8.5 (L) 06/06/2024      Lab Results   Component Value Date    .0 06/13/2024    .0 06/06/2024    .0 06/06/2024     BMP:   No results found for: \"GLUCOSE\"  Lab Results   Component Value Date    K 4.0 06/13/2024    K 3.4 (L) 06/06/2024    K 3.8 06/05/2024     Lab Results   Component Value Date    BUN 43 (H) 06/13/2024    BUN 30 (H) 06/06/2024    BUN 31 (H) 06/05/2024     Lab Results   Component Value Date    CREATSERUM 4.74 (H) 06/13/2024    CREATSERUM 4.55 (H) 06/06/2024    CREATSERUM 5.71 (H) 06/05/2024     Cholesterol:     Lab Results   Component Value Date    CHOLEST 170 06/23/2023    CHOLEST 157 11/23/2022    CHOLEST 168 08/31/2022     Lab Results   Component Value Date    HDL 43 06/23/2023    HDL 36 (L) 11/23/2022    HDL 43 08/31/2022     Lab Results   Component Value Date    TRIG 132 06/23/2023    TRIG 143 11/23/2022    TRIG 109 08/31/2022     Lab Results   Component Value Date     (H) 06/23/2023    LDL 96 11/23/2022     (H) 08/31/2022     Lab Results   Component Value Date    AST 25 06/13/2024    AST 25 06/01/2024    AST 31 08/05/2023     Lab Results   Component Value Date    ALT 27 06/13/2024    ALT 26 06/01/2024    ALT 32 08/05/2023           Luis Orozco MD  Interventional Cardiology  [place]  6/13/2024  10:58 AM

## 2024-06-14 LAB
ALBUMIN SERPL-MCNC: 3.1 G/DL (ref 3.2–4.8)
ANION GAP SERPL CALC-SCNC: 8 MMOL/L (ref 0–18)
BASOPHILS # BLD AUTO: 0.05 X10(3) UL (ref 0–0.2)
BASOPHILS NFR BLD AUTO: 0.8 %
BUN BLD-MCNC: 50 MG/DL (ref 9–23)
BUN/CREAT SERPL: 9.5 (ref 10–20)
C DIFF TOX B STL QL: NEGATIVE
CALCIUM BLD-MCNC: 9 MG/DL (ref 8.7–10.4)
CHLORIDE SERPL-SCNC: 104 MMOL/L (ref 98–112)
CO2 SERPL-SCNC: 29 MMOL/L (ref 21–32)
CREAT BLD-MCNC: 5.24 MG/DL
DEPRECATED RDW RBC AUTO: 62.8 FL (ref 35.1–46.3)
EGFRCR SERPLBLD CKD-EPI 2021: 11 ML/MIN/1.73M2 (ref 60–?)
EOSINOPHIL # BLD AUTO: 0.16 X10(3) UL (ref 0–0.7)
EOSINOPHIL NFR BLD AUTO: 2.6 %
ERYTHROCYTE [DISTWIDTH] IN BLOOD BY AUTOMATED COUNT: 18.2 % (ref 11–15)
GLUCOSE BLD-MCNC: 123 MG/DL (ref 70–99)
GLUCOSE BLDC GLUCOMTR-MCNC: 107 MG/DL (ref 70–99)
GLUCOSE BLDC GLUCOMTR-MCNC: 124 MG/DL (ref 70–99)
GLUCOSE BLDC GLUCOMTR-MCNC: 152 MG/DL (ref 70–99)
HCT VFR BLD AUTO: 20.8 %
HGB BLD-MCNC: 6.7 G/DL
HGB BLD-MCNC: 7.9 G/DL
IMM GRANULOCYTES # BLD AUTO: 0.04 X10(3) UL (ref 0–1)
IMM GRANULOCYTES NFR BLD: 0.6 %
LYMPHOCYTES # BLD AUTO: 1.33 X10(3) UL (ref 1–4)
LYMPHOCYTES NFR BLD AUTO: 21.4 %
MCH RBC QN AUTO: 30.5 PG (ref 26–34)
MCHC RBC AUTO-ENTMCNC: 32.2 G/DL (ref 31–37)
MCV RBC AUTO: 94.5 FL
MONOCYTES # BLD AUTO: 0.46 X10(3) UL (ref 0.1–1)
MONOCYTES NFR BLD AUTO: 7.4 %
NEUTROPHILS # BLD AUTO: 4.17 X10 (3) UL (ref 1.5–7.7)
NEUTROPHILS # BLD AUTO: 4.17 X10(3) UL (ref 1.5–7.7)
NEUTROPHILS NFR BLD AUTO: 67.2 %
OSMOLALITY SERPL CALC.SUM OF ELEC: 307 MOSM/KG (ref 275–295)
PHOSPHATE SERPL-MCNC: 4.3 MG/DL (ref 2.4–5.1)
PLATELET # BLD AUTO: 185 10(3)UL (ref 150–450)
POTASSIUM SERPL-SCNC: 3.7 MMOL/L (ref 3.5–5.1)
RBC # BLD AUTO: 2.2 X10(6)UL
SODIUM SERPL-SCNC: 141 MMOL/L (ref 136–145)
WBC # BLD AUTO: 6.2 X10(3) UL (ref 4–11)

## 2024-06-14 PROCEDURE — 99223 1ST HOSP IP/OBS HIGH 75: CPT | Performed by: INTERNAL MEDICINE

## 2024-06-14 PROCEDURE — 30233N1 TRANSFUSION OF NONAUTOLOGOUS RED BLOOD CELLS INTO PERIPHERAL VEIN, PERCUTANEOUS APPROACH: ICD-10-PCS | Performed by: INTERNAL MEDICINE

## 2024-06-14 PROCEDURE — 5A1D70Z PERFORMANCE OF URINARY FILTRATION, INTERMITTENT, LESS THAN 6 HOURS PER DAY: ICD-10-PCS | Performed by: INTERNAL MEDICINE

## 2024-06-14 PROCEDURE — 99233 SBSQ HOSP IP/OBS HIGH 50: CPT | Performed by: INTERNAL MEDICINE

## 2024-06-14 RX ORDER — SIMETHICONE 80 MG
80 TABLET,CHEWABLE ORAL
Status: DISCONTINUED | OUTPATIENT
Start: 2024-06-14 | End: 2024-06-18

## 2024-06-14 RX ORDER — ALBUMIN (HUMAN) 12.5 G/50ML
25 SOLUTION INTRAVENOUS
Status: ACTIVE | OUTPATIENT
Start: 2024-06-14 | End: 2024-06-16

## 2024-06-14 RX ORDER — SODIUM CHLORIDE 9 MG/ML
INJECTION, SOLUTION INTRAVENOUS ONCE
Status: COMPLETED | OUTPATIENT
Start: 2024-06-14 | End: 2024-06-15

## 2024-06-14 NOTE — PLAN OF CARE
Patient alert and oriented timed 4. Patient went for dialysis at 7 am. Called [facility] and scheduled for hemodialysis on Monday 06/17/2024. Patient received blood during dialysis and Physical assessment and checked sugar during dialysis which was 133. Patient uses call light appropriately. Call light within reach. Spouse at bedside.   Problem: Patient Centered Care  Goal: Patient preferences are identified and integrated in the patient's plan of care  Description: Interventions:  - What would you like us to know as we care for you? Wants hemoglobin stabilized  - Provide timely, complete, and accurate information to patient/family  - Incorporate patient and family knowledge, values, beliefs, and cultural backgrounds into the planning and delivery of care  - Encourage patient/family to participate in care and decision-making at the level they choose  - Honor patient and family perspectives and choices  Outcome: Progressing     Problem: Diabetes/Glucose Control  Goal: Glucose maintained within prescribed range  Description: INTERVENTIONS:  - Monitor Blood Glucose as ordered  - Assess for signs and symptoms of hyperglycemia and hypoglycemia  - Administer ordered medications to maintain glucose within target range  - Assess barriers to adequate nutritional intake and initiate nutrition consult as needed  - Instruct patient on self management of diabetes  Outcome: Progressing     Problem: Patient/Family Goals  Goal: Patient/Family Long Term Goal  Description: Patient's Long Term Goal: To get discharge home    Interventions:  - Help to initiate discharge process and communication  - See additional Care Plan goals for specific interventions  Outcome: Progressing  Goal: Patient/Family Short Term Goal  Description: Patient's Short Term Goal: to get colonoscopy    Interventions:   -Administer schedule prep on time, and prepare patient for colonoscopy  - See additional Care Plan goals for specific interventions  Outcome:  Progressing     Problem: PAIN - ADULT  Goal: Verbalizes/displays adequate comfort level or patient's stated pain goal  Description: INTERVENTIONS:  - Encourage pt to monitor pain and request assistance  - Assess pain using appropriate pain scale  - Administer analgesics based on type and severity of pain and evaluate response  - Implement non-pharmacological measures as appropriate and evaluate response  - Consider cultural and social influences on pain and pain management  - Manage/alleviate anxiety  - Utilize distraction and/or relaxation techniques  - Monitor for opioid side effects  - Notify MD/LIP if interventions unsuccessful or patient reports new pain  - Anticipate increased pain with activity and pre-medicate as appropriate  Outcome: Progressing     Problem: RISK FOR INFECTION - ADULT  Goal: Absence of fever/infection during anticipated neutropenic period  Description: INTERVENTIONS  - Monitor WBC  - Administer growth factors as ordered  - Implement neutropenic guidelines  Outcome: Progressing     Problem: SAFETY ADULT - FALL  Goal: Free from fall injury  Description: INTERVENTIONS:  - Assess pt frequently for physical needs  - Identify cognitive and physical deficits and behaviors that affect risk of falls.  - [place] fall precautions as indicated by assessment.  - Educate pt/family on patient safety including physical limitations  - Instruct pt to call for assistance with activity based on assessment  - Modify environment to reduce risk of injury  - Provide assistive devices as appropriate  - Consider OT/PT consult to assist with strengthening/mobility  - Encourage toileting schedule  Outcome: Progressing     Problem: DISCHARGE PLANNING  Goal: Discharge to home or other facility with appropriate resources  Description: INTERVENTIONS:  - Identify barriers to discharge w/pt and caregiver  - Include patient/family/discharge partner in discharge planning  - Arrange for needed discharge resources and  transportation as appropriate  - Identify discharge learning needs (meds, wound care, etc)  - Arrange for interpreters to assist at discharge as needed  - Consider post-discharge preferences of patient/family/discharge partner  - Complete POLST form as appropriate  - Assess patient's ability to be responsible for managing their own health  - Refer to Case Management Department for coordinating discharge planning if the patient needs post-hospital services based on physician/LIP order or complex needs related to functional status, cognitive ability or social support system  Outcome: Progressing

## 2024-06-14 NOTE — PHYSICAL THERAPY NOTE
Physical Therapy Contact Note    Orders received and chart reviewed. Attempted to see patient for Physical Therapy services. Patient not available secondary to patient off-unit at dialysis and presents with subtherapeutic HgB (6.7). Per chart review pt up independently, on most recent admission 6/1/24 therapy evaluated pt and he was supervision/independent using a rolling walker which he owns at home and therapy D/C'ed.      06/14/24 0830   VISIT TYPE   PT Inpatient Visit Type (Documentation Required) Attempted Evaluation  (HD, low HgB)     Will re-attempt pending patient availability/appropriateness as schedule allows, otherwise will re-schedule visit.    Daria Rivas, PT, DPT  [facility]  Rehab Services - Physical Therapy  [id number]

## 2024-06-14 NOTE — PROGRESS NOTES
Cardiology Progress Note  [facility]    Ollie Hernández Patient Status:  Emergency    1945 MRN [id number]   Location [facility] EMERGENCY DEPARTMENT Attending Antonio Nunes MD   Hosp Day # 1 PCP Wili Pamrar MD     Reason for Consultation:  Bleeding    History of Present Illness:  Ollie Hernández is a a(n) 79 year old male with coronary artery disease and PCI to circumflex (2024), paroxysmal atrial fibrillation on Eliquis, chronic HFpEF, ESRD on hemodialysis who presents with recurrent rectal bleeding.    His PCI was on 2024.  He was discharged on 1 week of triple therapy with aspirin 81 mg, Plavix, Eliquis.  Patient then stopped aspirin as planned.  He presented to [facility] on 2024 with rectal bleeding for 3 days while on dual antithrombotic therapy with Plavix and Eliquis.  Anticoagulation was held while patient underwent endoscopy, and he had APC of bleeding lesion and removal of a sessile polyp.    Placed back on Plavix plus Eliquis (restarted 24) and discharged.  He felt well without any bleeding until this morning at 7 AM, when he had a large bloody bowel movement with bright red blood.    Subjective:  Patient had bloody stool during dialysis with drop in Hgb to 6.7. He received 1U pRBCs.    Assessment/Plan:    ABLA Hgb 11.5 (24) -> 9.8   - Reviewed records: Hgb appears somewhat variable but baseline appears to be 10-11.  - Underwent colonoscopy s/p APC of bleeding lesion and removal of sessile polyp.  CAD - PCI LCX with Ramin Hudson ABIMBOLA x 1 (24)  - Restarted Eliquis + plavix on 24  - Eliquis stopped on 24 (last dose by patient 24 pm).  PAF on Eliquis  NSVT on Zio patch  Chronic HFpEF - compensated  ESRD on HD MWF  DM2  HTN  KRAIG on CPAP  LVEF 60% (echo )    Plan  Risk of life threatening stent thrombosis outweighs risk of life threatening hemorrhage at present.   Restart DAPT given above given risk of instent thrombosis in  this time period. Bleeding may be residual effects of Eliquis.  Repeat Hgb and transfuse for < 8.  Can do DAPT x 1 month as he received a Ramin Camden ABIMBOLA (FDA approved for shorter course of DAPT).  Can transition to ASA + Eliquis after 1 month of DAPT for a total duration of 6 months.  Patient currently hypertensive, continue home meds.    History:  Past Medical History:    Anemia    Asthma (HCC)    Back problem    BPH (benign prostatic hyperplasia)    Calculus of kidney    Cataract    Diabetes (HCC)    ESRD (end stage renal disease) on dialysis (HCC)    Essential hypertension    High blood pressure    High cholesterol    History of blood transfusion    Hyperlipidemia    Neuropathy    hands and feet    KRAIG on CPAP    Renal disorder    Sleep apnea    Visual impairment    glasses    Vocal cord paralysis, unilateral partial     Past Surgical History:   Procedure Laterality Date    Appendectomy      1981    Back surgery      Neck/back - 1998    Capsule endoscopy - internal referral      Cataract      12/2021 and 1/2022    Colonoscopy      Colonoscopy N/A 1/25/2021    Procedure: COLONOSCOPY;  Surgeon: Michael Gautam MD;  Location: [facility] ENDO    Colonoscopy N/A 6/3/2024    Procedure: COLONOSCOPY;  Surgeon: Gabriel Saldana MD;  Location: [facility] ENDOSCOPY    Hand/finger surgery unlisted      Accidental trauma    Upper gi endoscopy,diagnosis       Family History   Problem Relation Age of Onset    Cancer Father         Kidney    Breast Cancer Mother     Diabetes Mother     Diabetes Maternal Grandmother     Diabetes Maternal Grandfather     Heart Disorder Other         Uncle      reports that he quit smoking about 43 years ago. His smoking use included cigarettes. He started smoking about 60 years ago. He has a 17 pack-year smoking history. He has never used smokeless tobacco. He reports that he does not drink alcohol and does not use drugs.    Allergies:  Allergies   Allergen Reactions    Adhesive Tape OTHER (SEE COMMENTS)      Severe rashes    Dust Mite Extract RASH       Medications:  Current Facility-Administered Medications on File Prior to Encounter   Medication Dose Route Frequency Provider Last Rate Last Admin    [COMPLETED] iron sucrose (Venofer) 300 mg in sodium chloride 0.9% 250 mL IVPB  300 mg Intravenous Daily José Callahan .7 mL/hr at 24 1133 300 mg at 24 1133    [COMPLETED] polyethylene glycol-electrolyte (Golytely) 236 g oral solution 2,000 mL  2,000 mL Oral Once Emilio Talley MD   2,000 mL at 24 0456    [COMPLETED] polyethylene glycol-electrolyte (Golytely) 236 g oral solution 2,000 mL  2,000 mL Oral Once Emilio Talley MD   2,000 mL at 24 1800    [] sodium chloride 0.9 % IV bolus 100 mL  100 mL Intravenous Q30 Min PRN Walker Klein MD        And    [] albumin human (Albumin) 25% injection 25 g  25 g Intravenous PRN Dialysis Walker Klein MD        [COMPLETED] acetaminophen (Tylenol Extra Strength) tab 1,000 mg  1,000 mg Oral Once Devaughn Montiel John, DO   1,000 mg at 24 0345    [COMPLETED] sodium chloride 0.9% infusion   Intravenous On Call Luis Orozco MD   Stopped at 24 0000    [COMPLETED] lidocaine PF (Xylocaine-MPF) 2 % injection             [COMPLETED] heparin in sodium chloride 0.9% (Porcine) 2 Units/mL flush bag premix             [COMPLETED] heparin in sodium chloride 0.9% (Porcine) 2 Units/mL flush bag premix             [COMPLETED] fentaNYL (Sublimaze) 50 mcg/mL injection             [COMPLETED] midazolam (Versed) 2 MG/2ML injection             [COMPLETED] heparin (Porcine) 1000 UNIT/ML injection             [COMPLETED] Nitroglycerin in D5W 200-5 MCG/ML-% injection             [COMPLETED] midazolam (Versed) 2 MG/2ML injection             [COMPLETED] iohexol (Omnipaque) 300 MG/ML injection 350 mL  350 mL Intravenous ONCE PRN Luis Orozco MD   230 mL at 24 1601    [] sodium chloride 0.9% infusion   Intravenous Continuous  Luis Orozco MD   Stopped at 05/22/24 0000     Current Outpatient Medications on File Prior to Encounter   Medication Sig Dispense Refill    linaGLIPtin (TRADJENTA) 5 mg Oral Tab Take 1 tablet (5 mg total) by mouth daily. 90 tablet 1    hydrALAZINE 25 MG Oral Tab Take 1 tablet (25 mg total) by mouth every 8 (eight) hours. 90 tablet 1    clopidogrel 75 MG Oral Tab Take 1 tablet (75 mg total) by mouth daily. 90 tablet 1    escitalopram 5 MG Oral Tab Take 1 tablet (5 mg total) by mouth daily. 90 tablet 3    carvedilol 25 MG Oral Tab Take 1 tablet (25 mg total) by mouth 2 (two) times daily.      atorvastatin 40 MG Oral Tab Take 1 tablet (40 mg total) by mouth nightly. 90 tablet 3    apixaban 5 MG Oral Tab Take 1 tablet (5 mg total) by mouth 2 (two) times daily.      acetaminophen 500 MG Oral Tab Take 1 tablet (500 mg total) by mouth daily as needed for Pain.      cetirizine 10 MG Oral Tab Take 1 tablet (10 mg total) by mouth every other day.      Cholecalciferol 125 MCG (5000 UT) Oral Tab Take 1 tablet (5,000 Units total) by mouth 2 (two) times daily.      tetrahydrozoline 0.05 % Ophthalmic Solution 1 drop 2 (two) times daily as needed.      felodipine ER 10 MG Oral Tablet 24 Hr Take 1 tablet (10 mg total) by mouth daily. 90 tablet 3    fluticasone propionate 50 MCG/ACT Nasal Suspension 2 sprays by Nasal route daily. 48 g 3    pantoprazole 40 MG Oral Tab EC Take 1 tablet (40 mg total) by mouth every morning before breakfast. 90 tablet 3    albuterol (PROAIR HFA) 108 (90 Base) MCG/ACT Inhalation Aero Soln Inhale 2 puffs into the lungs every 4 (four) hours as needed for Wheezing. 3 each 3    Budesonide-Formoterol Fumarate (SYMBICORT) 160-4.5 MCG/ACT Inhalation Aerosol Inhale 2 puffs into the lungs 2 (two) times daily. (Patient taking differently: Inhale 2 puffs into the lungs 2 (two) times daily as needed.) 3 each 3    Darbepoetin Randell 60 MCG/ML Injection Solution Inject into the vein as needed.      polyethylene  glycol, PEG 3350, 17 g Oral Powd Pack 17 g Wed, Thurs, Sat      traMADol 50 MG Oral Tab Take 1 tablet (50 mg total) by mouth every 12 (twelve) hours as needed for Pain.      Glucose Blood (CONTOUR NEXT TEST) In Vitro Strip Test 3 times daily 300 each 1    methocarbamol 500 MG Oral Tab Take 1 tablet (500 mg total) by mouth 2 (two) times daily as needed. (Patient not taking: Reported on 6/13/2024) 60 tablet 1    Insulin Pen Needle (PEN NEEDLES) 32G X 4 MM Does not apply Misc 1 each daily. 100 each 0       Review of Systems:  Constitutional: denies fevers, chills, night sweats  HEENT: denies headache, vision changes, trouble or pain with swallowing  Cardiac: denies chest pain, palpitations, edema  Pulm: denies dyspnea, cough, wheeze  GI: denies n/v, abd pain, diarrhea or constipation  : denies hematuria, dysuria, incontinence  MSK: denies muscle or joint pains  Neuro: denies numbness, weakness, paresthesias  Psych: denies anxiety, depression  Integument: denies skin rashes or lesions  Heme: denies easy bruising or bleeding  Endo: denies heat/cold intolerance, skin or nail changes      Physical Exam:  Blood pressure 124/55, pulse 69, temperature 98.3 °F (36.8 °C), temperature source Oral, resp. rate 18, height 5' 4\" (1.626 m), weight 156 lb 8 oz (71 kg), SpO2 98%.  Wt Readings from Last 3 Encounters:   06/13/24 156 lb 8 oz (71 kg)   06/05/24 150 lb 4.8 oz (68.2 kg)   05/28/24 163 lb 9.6 oz (74.2 kg)       General: awake, alert, oriented x 3, no acute distress  HEENT: at/nc, perrl, eomi  Neck: No JVD, carotids 2+ no bruits.  Cardiac: Regular rate and rhythm, S1, S2 normal, no murmur, rub or gallop.  Lungs: Clear without wheezes, rales, rhonchi or dullness.  Normal excursions and effort.  Abdomen: Soft, non-tender, non-distended, normal bowel sounds   Extremities: Without clubbing, cyanosis or edema.  Peripheral pulses are 2+.  Neurologic: Alert and oriented, normal affect.  Psych: normal mood and affect  Skin: Warm and  dry.       Laboratories and Data:  Diagnostics:      Labs:   CBC:    Lab Results   Component Value Date    WBC 6.2 06/14/2024    WBC 6.3 06/13/2024    WBC 5.4 06/13/2024     Lab Results   Component Value Date    HGB 7.9 (L) 06/14/2024    HGB 6.7 (LL) 06/14/2024    HGB 9.5 (L) 06/13/2024      Lab Results   Component Value Date    .0 06/14/2024    .0 06/13/2024    .0 06/13/2024     BMP:   No results found for: \"GLUCOSE\"  Lab Results   Component Value Date    K 3.7 06/14/2024    K 4.0 06/13/2024    K 3.4 (L) 06/06/2024     Lab Results   Component Value Date    BUN 50 (H) 06/14/2024    BUN 43 (H) 06/13/2024    BUN 30 (H) 06/06/2024     Lab Results   Component Value Date    CREATSERUM 5.24 (H) 06/14/2024    CREATSERUM 4.74 (H) 06/13/2024    CREATSERUM 4.55 (H) 06/06/2024     Cholesterol:     Lab Results   Component Value Date    CHOLEST 170 06/23/2023    CHOLEST 157 11/23/2022    CHOLEST 168 08/31/2022     Lab Results   Component Value Date    HDL 43 06/23/2023    HDL 36 (L) 11/23/2022    HDL 43 08/31/2022     Lab Results   Component Value Date    TRIG 132 06/23/2023    TRIG 143 11/23/2022    TRIG 109 08/31/2022     Lab Results   Component Value Date     (H) 06/23/2023    LDL 96 11/23/2022     (H) 08/31/2022     Lab Results   Component Value Date    AST 25 06/13/2024    AST 25 06/01/2024    AST 31 08/05/2023     Lab Results   Component Value Date    ALT 27 06/13/2024    ALT 26 06/01/2024    ALT 32 08/05/2023           Luis Orozco MD  Interventional Cardiology  [place]

## 2024-06-14 NOTE — PROGRESS NOTES
This RN called [facility] as they stated to call when we are ready to give the patient blood.   This RN will endorse information to the day RN.  notified and aware as well.

## 2024-06-14 NOTE — PROGRESS NOTES
[facility]    Progress Note    Ollie Hernández Patient Status:  Inpatient    1931 MRN [id number]   Location [facility] 3W/SW Attending Jesus De Anda MD   Hosp Day # 1 PCP Wili Parmar MD     SUBJECTIVE   Patient is a 79 year old male who was admitted to the hospital for Rectal bleeding:    Overnight:persistence of hematochezia overnight on asa/plavix - both held after hbg dropped to below 7 this am requiring blood transfusion; dialysis today    Current Medications:  Current Facility-Administered Medications   Medication Dose Route Frequency    sodium chloride 0.9% infusion   Intravenous Once    albuterol (Ventolin HFA) 108 (90 Base) MCG/ACT inhaler 2 puff  2 puff Inhalation Q4H PRN    fluticasone propionate (Flonase) 50 MCG/ACT nasal suspension 2 spray  2 spray Nasal Daily    pantoprazole (Protonix) DR tab 40 mg  40 mg Oral QAM AC    cetirizine (ZyrTEC) tab 5 mg  5 mg Oral QOD    tetrahydrozoline (Visine) 0.05 % ophthalmic solution 1 drop  1 drop Both Eyes BID PRN    traMADol (Ultram) tab 50 mg  50 mg Oral Q12H PRN    carvedilol (Coreg) tab 25 mg  25 mg Oral BID    atorvastatin (Lipitor) tab 40 mg  40 mg Oral Nightly    escitalopram (Lexapro) tab 5 mg  5 mg Oral Daily    hydrALAZINE (Apresoline) tab 25 mg  25 mg Oral Q8H STEPHANIE    linaGLIPtin (Tradjenta) tab 5 mg  5 mg Oral Daily    fluticasone furoate-vilanterol (Breo Ellipta) 200-25 MCG/ACT inhaler 1 puff  1 puff Inhalation Daily    methocarbamol (Robaxin) tab 500 mg  500 mg Oral BID PRN    glucose (Dex4) 15 GM/59ML oral liquid 15 g  15 g Oral Q15 Min PRN    Or    glucose (Glutose) 40% oral gel 15 g  15 g Oral Q15 Min PRN    Or    glucose-vitamin C (Dex-4) chewable tab 4 tablet  4 tablet Oral Q15 Min PRN    Or    dextrose 50% injection 50 mL  50 mL Intravenous Q15 Min PRN    Or    glucose (Dex4) 15 GM/59ML oral liquid 30 g  30 g Oral Q15 Min PRN    Or    glucose (Glutose) 40% oral gel 30 g  30 g Oral Q15 Min PRN    Or    glucose-vitamin  C (Dex-4) chewable tab 8 tablet  8 tablet Oral Q15 Min PRN    acetaminophen (Tylenol Extra Strength) tab 500 mg  500 mg Oral Q4H PRN    melatonin tab 3 mg  3 mg Oral Nightly PRN    polyethylene glycol (PEG 3350) (Miralax) 17 g oral packet 17 g  17 g Oral Daily PRN    sennosides (Senokot) tab 17.2 mg  17.2 mg Oral Nightly PRN    bisacodyl (Dulcolax) 10 MG rectal suppository 10 mg  10 mg Rectal Daily PRN    ondansetron (Zofran) 4 MG/2ML injection 4 mg  4 mg Intravenous Q6H PRN    metoclopramide (Reglan) 5 mg/mL injection 5 mg  5 mg Intravenous Q8H PRN    insulin aspart (NovoLOG) 100 Units/mL FlexPen 1-5 Units  1-5 Units Subcutaneous TID CC    albumin human (Albumin) 25% injection 25 g  25 g Intravenous PRN Dialysis    [Held by provider] clopidogrel (Plavix) tab 75 mg  75 mg Oral Daily    [Held by provider] aspirin DR tab 81 mg  81 mg Oral Daily       Medications Prior to Admission   Medication Sig    linaGLIPtin (TRADJENTA) 5 mg Oral Tab Take 1 tablet (5 mg total) by mouth daily.    hydrALAZINE 25 MG Oral Tab Take 1 tablet (25 mg total) by mouth every 8 (eight) hours.    clopidogrel 75 MG Oral Tab Take 1 tablet (75 mg total) by mouth daily.    escitalopram 5 MG Oral Tab Take 1 tablet (5 mg total) by mouth daily.    carvedilol 25 MG Oral Tab Take 1 tablet (25 mg total) by mouth 2 (two) times daily.    atorvastatin 40 MG Oral Tab Take 1 tablet (40 mg total) by mouth nightly.    apixaban 5 MG Oral Tab Take 1 tablet (5 mg total) by mouth 2 (two) times daily.    acetaminophen 500 MG Oral Tab Take 1 tablet (500 mg total) by mouth daily as needed for Pain.    cetirizine 10 MG Oral Tab Take 1 tablet (10 mg total) by mouth every other day.    Cholecalciferol 125 MCG (5000 UT) Oral Tab Take 1 tablet (5,000 Units total) by mouth 2 (two) times daily.    tetrahydrozoline 0.05 % Ophthalmic Solution 1 drop 2 (two) times daily as needed.    felodipine ER 10 MG Oral Tablet 24 Hr Take 1 tablet (10 mg total) by mouth daily.     fluticasone propionate 50 MCG/ACT Nasal Suspension 2 sprays by Nasal route daily.    pantoprazole 40 MG Oral Tab EC Take 1 tablet (40 mg total) by mouth every morning before breakfast.    albuterol (PROAIR HFA) 108 (90 Base) MCG/ACT Inhalation Aero Soln Inhale 2 puffs into the lungs every 4 (four) hours as needed for Wheezing.    Budesonide-Formoterol Fumarate (SYMBICORT) 160-4.5 MCG/ACT Inhalation Aerosol Inhale 2 puffs into the lungs 2 (two) times daily. (Patient taking differently: Inhale 2 puffs into the lungs 2 (two) times daily as needed.)    Darbepoetin Randell 60 MCG/ML Injection Solution Inject into the vein as needed.    polyethylene glycol, PEG 3350, 17 g Oral Powd Pack 17 g Wed, Thurs, Sat    traMADol 50 MG Oral Tab Take 1 tablet (50 mg total) by mouth every 12 (twelve) hours as needed for Pain.    Glucose Blood (CONTOUR NEXT TEST) In Vitro Strip Test 3 times daily    methocarbamol 500 MG Oral Tab Take 1 tablet (500 mg total) by mouth 2 (two) times daily as needed. (Patient not taking: Reported on 6/13/2024)    Insulin Pen Needle (PEN NEEDLES) 32G X 4 MM Does not apply Misc 1 each daily.         Allergies  Allergies   Allergen Reactions    Adhesive Tape OTHER (SEE COMMENTS)     Severe rashes    Dust Mite Extract RASH       Physical Exam:   Blood pressure 139/52, pulse 69, temperature 97.7 °F (36.5 °C), temperature source Temporal, resp. rate 18, height 5' 4\" (1.626 m), weight 156 lb 8 oz (71 kg), SpO2 100%.    General appearance:  alert, appears stated age and cooperative    HEENT: negative findings: lids and lashes normal and conjunctivae and sclerae normal.     Pulmonary: clear to auscultation bilaterally of anterior chest    Cardiovascular: regular rate and rhythm    Abdominal: soft, bowel sounds present; non-distended, non-tender    Extremities: No edema, cyanosis, or clubbing    Skin: warm and dry    Neurologic: Grossly normal    Psychiatric: calm    Results:     Laboratory Data:  Lab Results    Component Value Date    WBC 6.2 06/14/2024    HGB 6.7 (LL) 06/14/2024    HCT 20.8 (L) 06/14/2024    .0 06/14/2024    CREATSERUM 5.24 (H) 06/14/2024    BUN 50 (H) 06/14/2024     06/14/2024    K 3.7 06/14/2024     06/14/2024    CO2 29.0 06/14/2024     (H) 06/14/2024    CA 9.0 06/14/2024    ALB 3.1 (L) 06/14/2024    ALKPHO 57 06/13/2024    TP 6.7 06/13/2024    AST 25 06/13/2024    ALT 27 06/13/2024    T4F 0.9 08/31/2022    TSH 2.060 06/23/2023     (H) 07/25/2023    PSA 2.94 10/20/2021    ESRML 79 (H) 03/16/2016    CRP 0.36 03/16/2016    MG 2.1 11/20/2023    PHOS 4.3 06/14/2024    TROP <0.045 07/25/2019     08/05/2023    B12 631 08/05/2023         Imaging:  No results found.         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)    Atherosclerosis of native arteries of extremities with intermittent claudication, bilateral legs (HCC)    Primary hypertension    Smokers' cough (HCC)    Unstable angina (HCC)    Lower GI bleed    ESRD (end stage renal disease) on dialysis (HCC)    Atrial fibrillation (HCC)    AVM (arteriovenous malformation) of colon    Anemia, blood loss    Rectal bleeding    Anticoagulated    Antiplatelet or antithrombotic long-term use     Rectal bleeding              -likely hemorrhoidal and based on streaking stool, lack of lh, reassuring hbg, and high  risk of stent thrombus best to observe, recent right sided AVM - treated but now with persistence of bleeding concern for recurrence of lesion                          -plan for OC in am                          -q12 cbc                          -hold anticoagultion                          -clear liquid/bowel prep/npo after midnight     Constipation              -miralax daily              -no straining or sitting on toilet for prolonged periods     CAD s/p recent PCI              -bleeding risk is now greater, will hold anticoag and get colonoscopy tomorrow    Thank you for allowing me to participate in the care of your patient.    Time spent in direct patient contact and decision making as well as counseling/coordination of care:  50 minutes    Note to patient: The 21st Century Cures Act makes medical notes like these available to patients in the interest of transparency. However, this is a medical document intended as peer to peer communication. It is written in medical language and may contain abbreviations or verbiage that are unfamiliar. It may appear blunt or direct. Medical documents are intended to carry relevant information, facts as evident, and the clinical opinion of the practitioner.      Carlyn Storey MD  [facility], GI  6/14/2024  6:42 AM

## 2024-06-14 NOTE — OCCUPATIONAL THERAPY NOTE
OT orders received and chart reviewed. Pt w/ Hgb of 6.7 and currently receiving dialysis. OT eval deferred. Will initiate treatment when medically appropriate

## 2024-06-14 NOTE — H&P
[facility]  part of [facility]    History and Physical    Ollie Hernández Patient Status:  Inpatient    1945 MRN [id number]   Location [facility] 4W/SW/SE Attending Wili Parmar MD   Hosp Day # 1 PCP Wili Parmar MD     Date:  2024  Date of Admission:  2024    History provided by:patient  HPI:     Chief Complaint   Patient presents with    GI Bleeding     79-year-old male with past medical history of coronary artery disease status post recent coronary stenting on 2024, currently on Plavix, paroxysmal atrial fibrillation on Eliquis, ESRD on hemodialysis, hypertension admitted with rectal bleeding.  He noticed rectal bleeding with a bowel movement on the day of admission. He was otherwise feeling ok. This morning, he is feeling tired. Reports chronic flatulence but otherwise no abdominal pain.     Of note, pt was recently discharged 24 for rectal bleeding. EGD and colonoscopy with small hepatic flexure versus proximal transverse colon lesion 1 mm/AVMs due for lesion status post cautery.  Prior to discharge, patient was resumed on Eliquis and Plavix and hemoglobin .         History     Past Medical History:    Anemia    Asthma (HCC)    Back problem    BPH (benign prostatic hyperplasia)    Calculus of kidney    Cataract    Diabetes (HCC)    ESRD (end stage renal disease) on dialysis (HCC)    Essential hypertension    High blood pressure    High cholesterol    History of blood transfusion    Hyperlipidemia    Neuropathy    hands and feet    KRAIG on CPAP    Renal disorder    Sleep apnea    Visual impairment    glasses    Vocal cord paralysis, unilateral partial     Past Surgical History:   Procedure Laterality Date    Appendectomy          Back surgery      Neck/back -     Capsule endoscopy - internal referral      Cataract      2021 and 2022    Colonoscopy      Colonoscopy N/A 2021    Procedure: COLONOSCOPY;  Surgeon: Michael Gautam MD;   Location: [facility] ENDO    Colonoscopy N/A 6/3/2024    Procedure: COLONOSCOPY;  Surgeon: Gabriel Saldaan MD;  Location: [facility] ENDOSCOPY    Hand/finger surgery unlisted      Accidental trauma    Upper gi endoscopy,diagnosis       Family History   Problem Relation Age of Onset    Cancer Father         Kidney    Breast Cancer Mother     Diabetes Mother     Diabetes Maternal Grandmother     Diabetes Maternal Grandfather     Heart Disorder Other         Uncle     Social History:  Social History     Socioeconomic History    Marital status:    Tobacco Use    Smoking status: Former     Current packs/day: 0.00     Average packs/day: 1 pack/day for 17.0 years (17.0 ttl pk-yrs)     Types: Cigarettes     Start date: 1964     Quit date: 1981     Years since quittin.4    Smokeless tobacco: Never   Vaping Use    Vaping status: Never Used   Substance and Sexual Activity    Alcohol use: No     Alcohol/week: 0.0 standard drinks of alcohol    Drug use: No    Sexual activity: Yes     Partners: Female     Social Determinants of Health     Financial Resource Strain: Low Risk  (2024)    Financial Resource Strain     Difficulty of Paying Living Expenses: Not hard at all     Med Affordability: No   Food Insecurity: No Food Insecurity (2024)    Food Insecurity     Food Insecurity: Never true   Transportation Needs: No Transportation Needs (2024)    Transportation Needs     Lack of Transportation: No   Housing Stability: Low Risk  (2024)    Housing Stability     Housing Instability: No     Allergies/Medications:   Allergies:   Allergies   Allergen Reactions    Adhesive Tape OTHER (SEE COMMENTS)     Severe rashes    Dust Mite Extract RASH     Medications Prior to Admission   Medication Sig    linaGLIPtin (TRADJENTA) 5 mg Oral Tab Take 1 tablet (5 mg total) by mouth daily.    hydrALAZINE 25 MG Oral Tab Take 1 tablet (25 mg total) by mouth every 8 (eight) hours.    clopidogrel 75 MG Oral Tab Take 1 tablet (75  mg total) by mouth daily.    escitalopram 5 MG Oral Tab Take 1 tablet (5 mg total) by mouth daily.    carvedilol 25 MG Oral Tab Take 1 tablet (25 mg total) by mouth 2 (two) times daily.    atorvastatin 40 MG Oral Tab Take 1 tablet (40 mg total) by mouth nightly.    apixaban 5 MG Oral Tab Take 1 tablet (5 mg total) by mouth 2 (two) times daily.    acetaminophen 500 MG Oral Tab Take 1 tablet (500 mg total) by mouth daily as needed for Pain.    cetirizine 10 MG Oral Tab Take 1 tablet (10 mg total) by mouth every other day.    Cholecalciferol 125 MCG (5000 UT) Oral Tab Take 1 tablet (5,000 Units total) by mouth 2 (two) times daily.    tetrahydrozoline 0.05 % Ophthalmic Solution 1 drop 2 (two) times daily as needed.    felodipine ER 10 MG Oral Tablet 24 Hr Take 1 tablet (10 mg total) by mouth daily.    fluticasone propionate 50 MCG/ACT Nasal Suspension 2 sprays by Nasal route daily.    pantoprazole 40 MG Oral Tab EC Take 1 tablet (40 mg total) by mouth every morning before breakfast.    albuterol (PROAIR HFA) 108 (90 Base) MCG/ACT Inhalation Aero Soln Inhale 2 puffs into the lungs every 4 (four) hours as needed for Wheezing.    Budesonide-Formoterol Fumarate (SYMBICORT) 160-4.5 MCG/ACT Inhalation Aerosol Inhale 2 puffs into the lungs 2 (two) times daily. (Patient taking differently: Inhale 2 puffs into the lungs 2 (two) times daily as needed.)    Darbepoetin Randell 60 MCG/ML Injection Solution Inject into the vein as needed.    polyethylene glycol, PEG 3350, 17 g Oral Powd Pack 17 g Wed, Thurs, Sat    traMADol 50 MG Oral Tab Take 1 tablet (50 mg total) by mouth every 12 (twelve) hours as needed for Pain.    Glucose Blood (CONTOUR NEXT TEST) In Vitro Strip Test 3 times daily    methocarbamol 500 MG Oral Tab Take 1 tablet (500 mg total) by mouth 2 (two) times daily as needed. (Patient not taking: Reported on 6/13/2024)    Insulin Pen Needle (PEN NEEDLES) 32G X 4 MM Does not apply Misc 1 each daily.       Review of Systems:      Constitutional:  Negative for activity change, chills and fatigue.   Respiratory:  Negative for cough and shortness of breath.    Cardiovascular:  Negative for palpitations and leg swelling.   Gastrointestinal:  Positive for blood in stool. Negative for nausea and abdominal pain.   Genitourinary: Negative.    Musculoskeletal: Negative.    Neurological:  Negative for weakness.       Physical Exam:   Vital Signs:  Blood pressure 139/52, pulse 69, temperature 97.7 °F (36.5 °C), temperature source Temporal, resp. rate 18, height 5' 4\" (1.626 m), weight 156 lb 8 oz (71 kg), SpO2 100%.  Physical Exam  Constitutional:       Comments: pale   HENT:      Mouth/Throat:      Mouth: Mucous membranes are moist.   Cardiovascular:      Rate and Rhythm: Normal rate and regular rhythm.      Heart sounds: No murmur heard.  Pulmonary:      Effort: Pulmonary effort is normal.      Breath sounds: Normal breath sounds.   Abdominal:      General: Bowel sounds are normal. There is distension.      Palpations: Abdomen is soft.   Musculoskeletal:         General: No swelling. Normal range of motion.   Skin:     General: Skin is warm and dry.      Capillary Refill: Capillary refill takes less than 2 seconds.      Coloration: Skin is pale.   Neurological:      Mental Status: He is alert.           Results:     Lab Results   Component Value Date    WBC 6.2 06/14/2024    HGB 6.7 (LL) 06/14/2024    HCT 20.8 (L) 06/14/2024    .0 06/14/2024    CREATSERUM 5.24 (H) 06/14/2024    BUN 50 (H) 06/14/2024     06/14/2024    K 3.7 06/14/2024     06/14/2024    CO2 29.0 06/14/2024     (H) 06/14/2024    CA 9.0 06/14/2024    ALB 3.1 (L) 06/14/2024    ALKPHO 57 06/13/2024    BILT 0.3 06/13/2024    TP 6.7 06/13/2024    AST 25 06/13/2024    ALT 27 06/13/2024    T4F 0.9 08/31/2022    TSH 2.060 06/23/2023     (H) 07/25/2023    PSA 2.94 10/20/2021    ESRML 79 (H) 03/16/2016    CRP 0.36 03/16/2016    MG 2.1 11/20/2023    PHOS 4.3  06/14/2024    TROP <0.045 07/25/2019    TROPHS 25 04/25/2022     08/05/2023    B12 631 08/05/2023     No results found.        Assessment/Plan:           Rectal bleeding   Acute on chronic anemia  -pt with hx of constipation/hemorrhoids/rectal bleeding in the past (saw GI Dr. Gautam for this in 10/2020)  -last colonoscopy 1/25/21 (Dr. Gautam) -- internal hemorrhoids, colon polyps x 2 (one tubular adenoma)  Recent egd/colonoscopy for GI bleeding 6/1/2024 demonstrating hepatic flexure AVM, which was cauterized  -eliquis on hold  -Hgb 6.7 this am; plan for transfusion  -d/w GI-plan for colonoscopy tomorrow am       CAD  -C 5/21/2024 by Dr. Luis Orozco: 70% lesion of apical LAD, 80% lesion of LCx  -S/p PCI of LCx (medical management of the LAD lesion)  -s/p aspirin, plavix and eliquis x 1 week, now just plavix and eliquis  -cardiology consulted--resume aspirin and plavix; plan to hold eliquis until completing 1 month DAPT     Pharyngeal dysphagia  --right side hypomobile oropharyngeal dysphagia  - swallow study 4/2024: intermittent shallow and deep laryngeal penetration without aspiration, small zenker's diverticulum, bulky endplate osteophytes contributing to dysphagia  - to see Dr. Del Angel (ENT at [facility]) for further imaging     Paroxysmal A-fib  --dx'ed 9/2023  --Zio monitor 10/2023 -- NSVT (normal EF 65%); on carvedilol  - followed by EP Dr. Phelan  --Eliquis on hold     Cervical radiculopathy  Chronic back pain with neuropathy  -CT scan of the cervical neck 7/2019 ; history of cervical decompressive laminectomy with multilevel degenerative disc disease  - s/p physical therapy in the past  --MRI brain and MRI cervical spine done 12/29/22 (MRI brain w/mod chronic microvascular ischemic changes bilat cerebral hemispheres, basal ganglia and thalami; MRI cervical spine w/multilevel spinal stenosis)  --saw NS PA Dr. Araujo in 1/2023; had subsequent MRI thoracic/lumbar spine.  Sx attributed to myelopathy due to  craniocervical junction stenosis; surgery deemed high risk.  Pt was referred for PT in 2/2023.      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 Sevilla  --HgbA1c 5.5 in 9/2023  - Gabapentin for neuropathy  --at home: on Tradjenta 5mg daily (no longer on insulin)  -- SSI in hospital  --BS 140s     ESRD on hemodialysis  - 2/2 prolonged history of diabetes and hypertension  - Follows with Dr. Martinez  -Last hemodialysis with 1 L out 6/5/2024 per Dr. Callahan     Hypertension  - carvedilol 25 mg BID, hydralazine 25mg q8h, felodipine 10mg/day (on amlodipine in hosp due to formulary)     Hyperlipidemia  - Continue with On atorvastatin 40 mg daily, aspirin     BPH  -no longer takes trospium or tamsulosin     Pulmonary hypertension  -Comanagement of KRAIG and chronic diastolic heart failure  - stable     KRAIG on CPAP  - sees pulm Dr. Landeros     Cataracts  -Seems to be stable, follows with Dr. Chase of ophthalmology     Gout  Flareup in 6/2022. NO recurrence  -Seems to be stable     Anemia of ESRD  -Baseline hemoglobin seems to be around 10-11  -managed by nephrology; on aranesp  -Hgb decreased as above     Sensorineural hearing loss  Mild-moderate per audiology testing/14/2022.  He may be a hearing aid candidate in the future     Unsteady Gait  Ongoing fatigue due to multiple comorbidities as above  - uses a walker, 2 wheeled-2 constipation.  Does not want a rollator walker at this time  - He is a fall risk with his unsteady gait, fatigue.     Anxiety  Lexapro 5 mg once a day.            **Certification      PHYSICIAN Certification of Need for Inpatient Hospitalization - Initial Certification    Patient will require inpatient services that will reasonably be expected to span two midnight's based on the clinical documentation in H+P.   Based on patients current state of  illness, I anticipate that, after discharge, patient will require TBD.        Ebonie Simmons DO  6/14/2024

## 2024-06-14 NOTE — PROGRESS NOTES
[facility]  part of [facility]    Progress Note    Ollie Hernández Patient Status:  Inpatient    1945 MRN [id number]   Location [facility] 4W/SW/SE Attending Wili Parmar MD   Hosp Day # 1 PCP Wili Parmar MD       Subjective:   Ollie Hernández is a(n) 79 year old male who I am seeing for ESRD      Resting    Objective:   /52 (BP Location: Left arm)   Pulse 69   Temp 97.7 °F (36.5 °C) (Temporal)   Resp 18   Ht 5' 4\" (1.626 m)   Wt 156 lb 8 oz (71 kg)   SpO2 100%   BMI 26.86 kg/m²      Intake/Output Summary (Last 24 hours) at 2024 0658  Last data filed at 2024 1129  Gross per 24 hour   Intake 50 ml   Output --   Net 50 ml     Wt Readings from Last 1 Encounters:   24 156 lb 8 oz (71 kg)       Exam  Gen: No acute distress, Heent: NC AT, mucous memb clear, neck supple  Pulm: Lungs clear, normal respiratory effort  CV: Heart with regular rate and rhythm, no edema  Abd: Abdomen soft, nontender, nondistended, no organomegaly, bowel sounds present  Skin: no symptoms reported  Psych: alert and oriented    Assessment and Plan:   1 - ESRD  HD today     2 -GI bleed/anemia  Hemoglobin is stable.  Status post evaluation by GI.  Suspect this is due to hemorrhoids.     3 - HTN w ESRD  He is on hydralazine, felodipine     4 -secondary hyperparathyroidism  Monitor calcium and phosphorus     5 -atrial fibrillation/coronary artery disease  Cardiology following.            Results:     Recent Labs   Lab 24  0849 24  1450 24  0340   RBC 3.11* 3.09* 2.20*   HGB 9.4* 9.5* 6.7*   HCT 30.1* 29.6* 20.8*   MCV 96.8 95.8 94.5   MCH 30.2 30.7 30.5   MCHC 31.2 32.1 32.2   RDW 18.2* 18.3* 18.2*   NEPRELIM 3.66 4.04 4.17   WBC 5.4 6.3 6.2   .0 205.0 185.0         Recent Labs   Lab 24  0849 24  0340   * 123*   BUN 43* 50*   CREATSERUM 4.74* 5.24*   CA 9.5 9.0    141   K 4.0 3.7    104   CO2 32.0 29.0          No results  found.        SONDRA MART MD  6/14/2024

## 2024-06-14 NOTE — PAYOR COMM NOTE
--------------  ADMISSION REVIEW     Payor: [employer]  Subscriber #:  [id number]  Authorization Number: [id number]    Admit date: 6/13/24  Admit time:  1:42 PM       REVIEW DOCUMENTATION:    Patient Seen in: [facility] Emergency Department    History     Chief Complaint   Patient presents with    GI Bleeding     Stated Complaint: Blood in stool     HPI    78 yo CAD s/p PCI 5/21/2024 on clopidogrel, pAF on eliquis, ESRD (MWF via RUE AVF), HTN presneting with recurrence of BRBPR x2 stool this morning; of note, patient s/p 6/3/2024 endoscopy demonstrating proximal transverse/hepatic flexure 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 No pain. No straining/constipation.    Social Determinants of Health     Financial Resource Strain: Low Risk  (5/23/2024)    Financial Resource Strain     Difficulty of Paying Living Expenses: Not hard at all     Med Affordability: No   Food Insecurity: No Food Insecurity (6/1/2024)    Food Insecurity     Food Insecurity: Never true   Transportation Needs: No Transportation Needs (6/1/2024)    Transportation Needs     Lack of Transportation: No   Housing Stability: Low Risk  (6/1/2024)    Housing Stability     Housing Instability: No     Review of Systems :  Gastrointestinal: (+) rectal bleeding.    Positive for stated complaint: Blood in stool    Physical Exam     ED Triage Vitals [06/13/24 0732]   /59   Pulse 69   Resp 18   Temp 98.6 °F (37 °C)   Temp src Oral   SpO2 97 %   O2 Device None (Room air)     ED Course     Labs Reviewed   COMP METABOLIC PANEL (14) - Abnormal; Notable for the following components:       Result Value    Glucose 127 (*)     BUN 43 (*)     Creatinine 4.74 (*)     BUN/CREA Ratio 9.1 (*)     Calculated Osmolality 312 (*)     eGFR-Cr 12 (*)     All other components within normal limits   CBC W/ DIFFERENTIAL - Abnormal; Notable for the following components:    RBC 3.11 (*)     HGB 9.4 (*)     HCT 30.1  (*)     RDW-SD 63.8 (*)     RDW 18.2 (*)       MDM   DIFFERENTIAL DIAGNOSIS: After history and physical exam differential diagnosis includes but is not limited to rectal bleeding, anticoagulated state, bleeding dyscrasia, functional thrombocytopenia.    Medical Decision Making  Evaluation for rectal bleeding in the setting of PCI on DAPT in addition to DOAC therapy in setting of paroxysmal atrial fibrillation; CARLOS with bloody stool noted without active bleeding and hemodynamically stable patient with history of ESRD and likely uremic contribution to GI bleed.  Type and screen sent, does not present initiated and will admit for ongoing monitoring/management with consideration for endoscopic evaluation.  Case discussed with primary care Dr. Parmar for admission, GI Dr. Storey in consultation in addition to Dr. Martinez in nephrology and Dr. Julio in cardiology consultation.    Disposition and Plan     Clinical Impression:  1. Rectal bleeding    2. ESRD (end stage renal disease) on dialysis (HCC)    3. Anticoagulated    4. Antiplatelet or antithrombotic long-term use        6/13/24 GI    Hematochezia     History of Present Illness:   Patient is a 79 year old male with hx sig for  CAD s/p PCI 5/21/2024 on clopidogrel, pAF on eliquis, DM, ESRD (MWF via RUE AVF), HTN who was admitted to the hospital for Rectal bleeding, states he has a lot of gas, had bm this am with blood streaking stool, no clots, no syncope.      Past Medical History  Past Medical History       Past Medical History:    Anemia    Asthma (HCC)    Back problem    BPH (benign prostatic hyperplasia)    Calculus of kidney    Cataract    Diabetes (HCC)    ESRD (end stage renal disease) on dialysis (HCC)    Essential hypertension    High blood pressure    High cholesterol    History of blood transfusion    Hyperlipidemia    Neuropathy     hands and feet    KRAIG on CPAP    Renal disorder    Sleep apnea    Visual impairment     glasses    Vocal cord paralysis,  unilateral partial         Past Surgical History  Past Surgical History[]Expand by Default         Past Surgical History:   Procedure Laterality Date    Appendectomy         1981    Back surgery         Neck/back - 1998    Capsule endoscopy - internal referral        Cataract         12/2021 and 1/2022    Colonoscopy        Colonoscopy N/A 1/25/2021     Procedure: COLONOSCOPY;  Surgeon: Michael Gautam MD;  Location: [facility] ENDO    Colonoscopy N/A 6/3/2024     Procedure: COLONOSCOPY;  Surgeon: Gabriel Saldana MD;  Location: [facility] ENDOSCOPY    Hand/finger surgery unlisted         Accidental trauma    Upper gi endoscopy,diagnosis             Rectal bleeding              -likely hemorrhoidal and based on streaking stool, lack of lh, reassuring hbg, and high risk of stent thrombus best to observe, recent right sided AVM - treated                          -continue to observe                          -q12 cbc                          -continue anticoagultion                          -address hemorrhoids - hydrocortisone suppository as likely internal hemorrhoids in setting of painless bleeding     Constipation              -miralax daily              -no strainign or sitting on toilet for prolonged periods     CAD s/p recent PCI              -defer to cardiology - cardiac risk is greater that bleeding risk at this point based on presentation; will observe and optimize treatment of hemorrhoids     Thank you for allowing me to participate in the care of your patient.     Continue to follow      Time spent in direct patient contact and decision making as well as counseling/coordination of care:  80 minutes     Carlyn Storey MD        6/13/24 GI    Reason for Consultation:  Bleeding     History of Present Illness:  Ollie Hernández is a a(n) 79 year old male with coronary artery disease and PCI to circumflex (5/21/2024), paroxysmal atrial fibrillation on Eliquis, chronic HFpEF, ESRD on hemodialysis who presents with recurrent  rectal bleeding.     His PCI was on 5/21/2024.  He was discharged on 1 week of triple therapy with aspirin 81 mg, Plavix, Eliquis.  Patient then stopped aspirin as planned.  He presented to [facility] on 6/1/2024 with rectal bleeding for 3 days while on dual antithrombotic therapy with Plavix and Eliquis.  Anticoagulation was held while patient underwent endoscopy, and he had APC of bleeding lesion and removal of a sessile polyp.     Placed back on Plavix plus Eliquis (restarted 6/5/24) and discharged.  He felt well without any bleeding until this morning at 7 AM, when he had a large bloody bowel movement with bright red blood.     Hgb 9.4     Assessment/Plan:     ABLA Hgb 11.5 (5/22/24) -> 9.8   - Reviewed records: Hgb appears somewhat variable but baseline appears to be 10-11.  - Underwent colonoscopy s/p APC of bleeding lesion and removal of sessile polyp.  CAD - PCI LCX with [place] ABIMBOLA x 1 (5/21/24)  - Restarted Eliquis + plavix on 6/5/24  - Hgb mildly down today but after dialysis. He has not had any bloody stools.  PAF on Eliquis  NSVT on Zio patch  Chronic HFpEF - compensated  ESRD on HD MWF  DM2  HTN  KRAIG on CPAP  LVEF 60% (echo 9/23)     Plan  Risk of life threatening stent thrombosis outweighs risk of life threatening hemorrhage at present.   Restart DAPT given above. Can do DAPT x 1 month as he received a [place] ABIMBOLA (FDA approved for shorter course of DAPT).  Can transition to ASA + Eliquis after 1 month of DAPT for a total duration of 6 months.  Patient currently hypertensive, continue home meds.     Luis Orozco MD  Interventional Cardiology  [place]  6/13/2024  10:58 AM         06/14/24 0805   Gastrointestinal   Last BM Date 06/13/24   Gastrointestinal (WDL) WDL   Abdomen Inspection Soft   Bowel Sounds (All Quadrants) Active   Tenderness Soft   GI Symptoms Rectal bleeding         Recent Labs   Lab 06/13/24  0849 06/13/24  1450 06/14/24  0340   RBC 3.11* 3.09* 2.20*   HGB 9.4* 9.5* 6.7*    HCT 30.1* 29.6* 20.8*   MCV 96.8 95.8 94.5   MCH 30.2 30.7 30.5   MCHC 31.2 32.1 32.2   RDW 18.2* 18.3* 18.2*   NEPRELIM 3.66 4.04 4.17   WBC 5.4 6.3 6.2   .0 205.0 185.0             Recent Labs   Lab 06/13/24  0849 06/14/24  0340   * 123*   BUN 43* 50*   CREATSERUM 4.74* 5.24*   CA 9.5 9.0    141   K 4.0 3.7    104   CO2 32.0 29.0            MEDICATIONS ADMINISTERED IN LAST 1 DAY:  Transfuse RBC       Date Action Dose Route User    6/14/2024 0807 Rate/Dose Change (none) Intravenous Leonel Kline RN    6/14/2024 0752 New Bag (none) Intravenous Loenel Kline RN          atorvastatin (Lipitor) tab 40 mg       Date Action Dose Route User    6/13/2024 2121 Given 40 mg Oral Yulisa Yates RN          carvedilol (Coreg) tab 25 mg       Date Action Dose Route User    6/13/2024 2121 Given 25 mg Oral Yulisa Yates RN    6/13/2024 1425 Given 25 mg Oral Man Loyola RN          cetirizine (ZyrTEC) tab 5 mg       Date Action Dose Route User    6/13/2024 1425 Given 5 mg Oral Man Loyola RN          desmopressin (DDAVP) 21.8 mcg in sodium chloride 0.9% 50 mL IVPB       Date Action Dose Route User    6/13/2024 0953 New Bag 21.8 mcg Intravenous Negar Chavarria RN          escitalopram (Lexapro) tab 5 mg       Date Action Dose Route User    6/13/2024 1425 Given 5 mg Oral Man Loyola RN          fluticasone furoate-vilanterol (Breo Ellipta) 200-25 MCG/ACT inhaler 1 puff       Date Action Dose Route User    6/14/2024 0617 Given 1 puff Inhalation Edgar rTeadwell, TERRY          fluticasone propionate (Flonase) 50 MCG/ACT nasal suspension 2 spray       Date Action Dose Route User    6/13/2024 1425 Given 2 spray Nasal (Bilateral Nares) Man Loyola RN          hydrALAZINE (Apresoline) tab 25 mg       Date Action Dose Route User    6/13/2024 2121 Given 25 mg Oral Yulisa Yates RN    6/13/2024 1425 Given 25 mg Oral Man Loyola RN          insulin aspart (NovoLOG) 100 Units/mL FlexPen 1-5 Units       Date  Action Dose Route User    6/13/2024 1801 Given 2 Units Subcutaneous (Right Lower Abdomen) Man Loyola RN          linaGLIPtin (Tradjenta) tab 5 mg       Date Action Dose Route User    6/13/2024 1425 Given 5 mg Oral Man Loyola RN          melatonin tab 3 mg       Date Action Dose Route User    6/13/2024 2121 Given 3 mg Oral Yulisa Yates RN          pantoprazole (Protonix) DR tab 40 mg       Date Action Dose Route User    6/14/2024 0519 Given 40 mg Oral Yulisa Yates RN            Vitals (last day)       Date/Time Temp Pulse Resp BP SpO2 Weight O2 Device O2 Flow Rate (L/min) [facility]    06/14/24 0807 97.6 °F (36.4 °C) 67 18 100/50 100 % -- -- -- PP    06/14/24 0750 97.5 °F (36.4 °C) 69 18 120/49 100 % -- None (Room air) -- PP    06/14/24 0604 -- 69 -- 139/52 100 % -- None (Room air) -- KT    06/14/24 0424 97.7 °F (36.5 °C) -- 18 108/54 97 % -- CPAP -- KT    06/14/24 0424 -- 63 -- -- -- -- -- -- IL    06/13/24 2153 -- 68 -- -- 98 % -- -- -- J    06/13/24 2104 97.5 °F (36.4 °C) -- 18 156/58 98 % -- None (Room air) -- KT    06/13/24 2104 -- 68 -- -- -- -- -- -- IL    06/13/24 1904 -- 67 -- -- -- -- -- -- GT    06/13/24 1548 -- 74 18 152/49 99 % -- None (Room air) -- EG    06/13/24 1445 -- 70 -- -- -- -- -- -- KP    06/13/24 1348 98.4 °F (36.9 °C) 79 18 163/75 100 % 156 lb 8 oz None (Room air) -- CW    06/13/24 1330 -- 69 16 174/65 99 % -- None (Room air) --     06/13/24 1315 -- 69 17 169/73 100 % -- None (Room air) --     06/13/24 1245 -- 71 19 165/71 100 % -- None (Room air) --     06/13/24 1200 -- 70 20 166/70 99 % -- None (Room air) --     06/13/24 1100 -- 73 25 178/87 100 % -- None (Room air) --     06/13/24 1000 -- 66 18 185/85 99 % -- None (Room air) --     06/13/24 0900 -- 64 17 180/76 98 % -- None (Room air) --     06/13/24 0732 98.6 °F (37 °C) 69 18 138/59 97 % 160 lb None (Room air) -- SN       Blood Transfusion Record       Product Unit Status Volume Start End            Transfuse RBC        24  [id number]  [id number] Transfusing  06/14/24 0752                 -

## 2024-06-14 NOTE — SLP NOTE
ADULT SWALLOWING EVALUATION    ASSESSMENT    ASSESSMENT/OVERALL IMPRESSION:  PPE REQUIRED. THIS THERAPIST WORE GLOVES AND MASK FOR DURATION OF EVALUATION. HANDS WASHED UPON ENTRANCE/EXIT.    Per GI MD note, \"Patient is a 79 year old male with hx sig for  CAD s/p PCI 5/21/2024 on clopidogrel, pAF on eliquis, DM, ESRD (MWF via RUE AVF), HTN who was admitted to the hospital for Rectal bleeding, states he has a lot of gas, had bm this am with blood streaking stool, no clots, no syncope.  Was told to look for blood and that he is the reason he is here\"    SLP BSSE orders received and acknowledged. A swallow evaluation warranted per modified diet consistency. Pt afebrile with clear vocal quality, on room air, with oxygen saturation at 98%. Pt with hx of dysphagia at [facility], VFSS 4/18/24 with recommendations for regular/thin liquids.   Pt positioned 90 degrees in bedside chair, alert/cooperative/spouse present. Pt with no complaints of pain. Oral motor skills within functional limits. Pt presented with trials of thin liquids via cup. Pt with adequate oral acceptance and bilabial seal across all trials. Pt with intact bolus formation/propulsion. Pt's swallow response appears delayed with reduced hyolaryngeal elevation/excursion. No clinical signs of aspiration (e.g., immediate/delayed throat clear, immediate/delayed cough, wet vocal quality, increased O2 effort) observed across all trials. No CXR on this admission. Oxygen status remained stable t/o the entire evaluation.     At this time, pt presents with functional oral swallow stage and probable pharyngeal dysfunction. Recommend thin liquids (CLD per MD) with strict adherence to safe swallowing compensatory strategies. Results and recommendations reviewed with RN, pt, and family. Pt/pt's family v/u to all results/recommendations. Recommendations remain written on whiteboard. SLP collaborated with RN for MD diet orders.     PLAN: SLP to f/u x1-2 meal assessments, monitor  imaging, and VFSS if clinically indicated         RECOMMENDATIONS   Diet Recommendations - Solids:  (TBD)  Diet Recommendations - Liquids: Thin Liquids (CLD per MD)                        Compensatory Strategies Recommended: No straws;Slow rate;Small bites and sips  Aspiration Precautions: Upright position;Slow rate;Small bites and sips;No straw  Medication Administration Recommendations:  (as tolerated)  Treatment Plan/Recommendations: Aspiration precautions    HISTORY   MEDICAL HISTORY  Reason for Referral: Altered diet consistency    Problem List  Principal Problem:    Rectal bleeding  Active Problems:    ESRD (end stage renal disease) on dialysis (HCC)    Anticoagulated    Antiplatelet or antithrombotic long-term use      Past Medical History  Past Medical History:    Anemia    Asthma ([facility])    Back problem    BPH (benign prostatic hyperplasia)    Calculus of kidney    Cataract    Diabetes (HCC)    ESRD (end stage renal disease) on dialysis ([facility])    Essential hypertension    High blood pressure    High cholesterol    History of blood transfusion    Hyperlipidemia    Neuropathy    hands and feet    KRAIG on CPAP    Renal disorder    Sleep apnea    Visual impairment    glasses    Vocal cord paralysis, unilateral partial       Prior Living Situation: Home with spouse  Diet Prior to Admission: Regular;Thin liquids  Precautions: Aspiration    Patient/Family Goals: did not state    SWALLOWING HISTORY  Current Diet Consistency: Nectar thick liquids/ Mildly thick  Dysphagia History: see above  Imaging Results: 4/18/24 VFSS  CONCLUSION:   1. Intermittent shallow and deep laryngeal penetration without aspiration.   2. Small Zenker's diverticulum left paracentral region at C5-6.  Small barium tablet momentarily held up within the diverticulum eventually cleared with additional swallowing.   3. 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       SUBJECTIVE       OBJECTIVE   ORAL MOTOR EXAMINATION  Dentition: Functional  Symmetry: Within Functional Limits  Strength: Within Functional Limits     Range of Motion: Within Functional Limits  Rate of Motion: Within Functional Limits    Voice Quality: Clear  Respiratory Status: Unlabored  Consistencies Trialed: Thin liquids  Method of Presentation: Self presentation;Cup  Patient Positioning: Upright;Midline;Standard chair    Oral Phase of Swallow: Within Functional Limits                      Pharyngeal Phase of Swallow: Impaired  Laryngeal Elevation Timing: Appears impaired  Laryngeal Elevation Strength: Appears impaired  Laryngeal Elevation Coordination: Appears intact  (Please note: Silent aspiration cannot be evaluated clinically. Videofluoroscopic Swallow Study is required to rule-out silent aspiration.)    Esophageal Phase of Swallow: No complaints consistent with possible esophageal involvement  Comments: NA              GOALS  Goal #1 The patient will tolerate regular(when cleared by MD) consistency and thin liquids without overt signs or symptoms of aspiration with 100 % accuracy over 1-2 session(s).  In Progress   Goal #2 The patient/family/caregiver will demonstrate understanding and implementation of aspiration precautions and swallow strategies independently over 1-2 session(s).    In Progress   Goal #3 The patient will utilize compensatory strategies as outlined by  BSSE (clinical evaluation) including Slow rate, Small sips, No straws, Upright 90 degrees, Eliminate distractions with min assistance 100 % of the time across 1-2 sessions.  In Progress     FOLLOW UP  Treatment Plan/Recommendations: Aspiration precautions  Number of Visits to Meet Established Goals:  (1-2)  Follow Up Needed (Documentation Required): Yes  SLP Follow-up Date: 06/16/24    Thank you for  your referral.   If you have any questions, please contact ARTEMIO Sawant M.S. CCC-SLP  Speech Language Pathologist  Phone Number [phone]

## 2024-06-14 NOTE — PLAN OF CARE
Patient has safety precautions in place bed in the lowest position, bed alarm on, and call light within reach. Plan of care ongoing. No further concerns as of present.    Problem: Patient Centered Care  Goal: Patient preferences are identified and integrated in the patient's plan of care  Description: Interventions:    - Provide timely, complete, and accurate information to patient/family  - Incorporate patient and family knowledge, values, beliefs, and cultural backgrounds into the planning and delivery of care  - Encourage patient/family to participate in care and decision-making at the level they choose  - Honor patient and family perspectives and choices  Outcome: Progressing     Problem: Diabetes/Glucose Control  Goal: Glucose maintained within prescribed range  Description: INTERVENTIONS:  - Monitor Blood Glucose as ordered  - Assess for signs and symptoms of hyperglycemia and hypoglycemia  - Administer ordered medications to maintain glucose within target range  - Assess barriers to adequate nutritional intake and initiate nutrition consult as needed  - Instruct patient on self management of diabetes    Problem: PAIN - ADULT  Goal: Verbalizes/displays adequate comfort level or patient's stated pain goal  Description: INTERVENTIONS:  - Encourage pt to monitor pain and request assistance  - Assess pain using appropriate pain scale  - Administer analgesics based on type and severity of pain and evaluate response  - Implement non-pharmacological measures as appropriate and evaluate response  - Consider cultural and social influences on pain and pain management  - Manage/alleviate anxiety  - Utilize distraction and/or relaxation techniques  - Monitor for opioid side effects  - Notify MD/LIP if interventions unsuccessful or patient reports new pain  - Anticipate increased pain with activity and pre-medicate as appropriate  Outcome: Progressing     Problem: RISK FOR INFECTION - ADULT  Goal: Absence of  fever/infection during anticipated neutropenic period  Description: INTERVENTIONS  - Monitor WBC  - Administer growth factors as ordered  - Implement neutropenic guidelines  Outcome: Progressing     Problem: SAFETY ADULT - FALL  Goal: Free from fall injury  Description: INTERVENTIONS:  - Assess pt frequently for physical needs  - Identify cognitive and physical deficits and behaviors that affect risk of falls.  - [place] fall precautions as indicated by assessment.  - Educate pt/family on patient safety including physical limitations  - Instruct pt to call for assistance with activity based on assessment  - Modify environment to reduce risk of injury  - Provide assistive devices as appropriate  - Consider OT/PT consult to assist with strengthening/mobility  - Encourage toileting schedule  Outcome: Progressing     Problem: DISCHARGE PLANNING  Goal: Discharge to home or other facility with appropriate resources  Description: INTERVENTIONS:  - Identify barriers to discharge w/pt and caregiver  - Include patient/family/discharge partner in discharge planning  - Arrange for needed discharge resources and transportation as appropriate  - Identify discharge learning needs (meds, wound care, etc)  - Arrange for interpreters to assist at discharge as needed  - Consider post-discharge preferences of patient/family/discharge partner  - Complete POLST form as appropriate  - Assess patient's ability to be responsible for managing their own health  - Refer to Case Management Department for coordinating discharge planning if the patient needs post-hospital services based on physician/LIP order or complex needs related to functional status, cognitive ability or social support system  Outcome: Progressing

## 2024-06-15 ENCOUNTER — ANESTHESIA EVENT (OUTPATIENT)
Dept: ENDOSCOPY | Facility: HOSPITAL | Age: 79
DRG: 377 | End: 2024-06-15
Payer: MEDICARE

## 2024-06-15 ENCOUNTER — ANESTHESIA (OUTPATIENT)
Dept: ENDOSCOPY | Facility: HOSPITAL | Age: 79
DRG: 377 | End: 2024-06-15
Payer: MEDICARE

## 2024-06-15 LAB
ALBUMIN SERPL-MCNC: 3.4 G/DL (ref 3.2–4.8)
ALBUMIN SERPL-MCNC: 3.4 G/DL (ref 3.2–4.8)
ALBUMIN/GLOB SERPL: 1.4 {RATIO} (ref 1–2)
ALP LIVER SERPL-CCNC: 47 U/L
ALT SERPL-CCNC: 16 U/L
ANION GAP SERPL CALC-SCNC: 6 MMOL/L (ref 0–18)
ANION GAP SERPL CALC-SCNC: 6 MMOL/L (ref 0–18)
AST SERPL-CCNC: 21 U/L (ref ?–34)
BASOPHILS # BLD AUTO: 0.05 X10(3) UL (ref 0–0.2)
BASOPHILS # BLD AUTO: 0.06 X10(3) UL (ref 0–0.2)
BASOPHILS NFR BLD AUTO: 0.6 %
BASOPHILS NFR BLD AUTO: 0.7 %
BILIRUB SERPL-MCNC: 0.6 MG/DL (ref 0.2–1.1)
BLOOD TYPE BARCODE: 6200
BUN BLD-MCNC: 28 MG/DL (ref 9–23)
BUN BLD-MCNC: 28 MG/DL (ref 9–23)
BUN/CREAT SERPL: 7 (ref 10–20)
BUN/CREAT SERPL: 7 (ref 10–20)
CALCIUM BLD-MCNC: 9 MG/DL (ref 8.7–10.4)
CALCIUM BLD-MCNC: 9 MG/DL (ref 8.7–10.4)
CHLORIDE SERPL-SCNC: 103 MMOL/L (ref 98–112)
CHLORIDE SERPL-SCNC: 103 MMOL/L (ref 98–112)
CO2 SERPL-SCNC: 33 MMOL/L (ref 21–32)
CO2 SERPL-SCNC: 33 MMOL/L (ref 21–32)
CREAT BLD-MCNC: 3.99 MG/DL
CREAT BLD-MCNC: 3.99 MG/DL
DEPRECATED RDW RBC AUTO: 62.4 FL (ref 35.1–46.3)
DEPRECATED RDW RBC AUTO: 64.8 FL (ref 35.1–46.3)
EGFRCR SERPLBLD CKD-EPI 2021: 15 ML/MIN/1.73M2 (ref 60–?)
EGFRCR SERPLBLD CKD-EPI 2021: 15 ML/MIN/1.73M2 (ref 60–?)
EOSINOPHIL # BLD AUTO: 0.09 X10(3) UL (ref 0–0.7)
EOSINOPHIL # BLD AUTO: 0.14 X10(3) UL (ref 0–0.7)
EOSINOPHIL NFR BLD AUTO: 1 %
EOSINOPHIL NFR BLD AUTO: 1.7 %
ERYTHROCYTE [DISTWIDTH] IN BLOOD BY AUTOMATED COUNT: 19 % (ref 11–15)
ERYTHROCYTE [DISTWIDTH] IN BLOOD BY AUTOMATED COUNT: 19.4 % (ref 11–15)
GLOBULIN PLAS-MCNC: 2.4 G/DL (ref 2–3.5)
GLUCOSE BLD-MCNC: 137 MG/DL (ref 70–99)
GLUCOSE BLD-MCNC: 137 MG/DL (ref 70–99)
GLUCOSE BLDC GLUCOMTR-MCNC: 131 MG/DL (ref 70–99)
GLUCOSE BLDC GLUCOMTR-MCNC: 132 MG/DL (ref 70–99)
GLUCOSE BLDC GLUCOMTR-MCNC: 140 MG/DL (ref 70–99)
GLUCOSE BLDC GLUCOMTR-MCNC: 147 MG/DL (ref 70–99)
GLUCOSE BLDC GLUCOMTR-MCNC: 152 MG/DL (ref 70–99)
HCT VFR BLD AUTO: 22 %
HCT VFR BLD AUTO: 23.1 %
HGB BLD-MCNC: 7.3 G/DL
HGB BLD-MCNC: 7.3 G/DL
IMM GRANULOCYTES # BLD AUTO: 0.03 X10(3) UL (ref 0–1)
IMM GRANULOCYTES # BLD AUTO: 0.03 X10(3) UL (ref 0–1)
IMM GRANULOCYTES NFR BLD: 0.3 %
IMM GRANULOCYTES NFR BLD: 0.4 %
LYMPHOCYTES # BLD AUTO: 1.28 X10(3) UL (ref 1–4)
LYMPHOCYTES # BLD AUTO: 1.8 X10(3) UL (ref 1–4)
LYMPHOCYTES NFR BLD AUTO: 14.3 %
LYMPHOCYTES NFR BLD AUTO: 21.7 %
MCH RBC QN AUTO: 29.6 PG (ref 26–34)
MCH RBC QN AUTO: 30.4 PG (ref 26–34)
MCHC RBC AUTO-ENTMCNC: 31.6 G/DL (ref 31–37)
MCHC RBC AUTO-ENTMCNC: 33.2 G/DL (ref 31–37)
MCV RBC AUTO: 91.7 FL
MCV RBC AUTO: 93.5 FL
MONOCYTES # BLD AUTO: 0.59 X10(3) UL (ref 0.1–1)
MONOCYTES # BLD AUTO: 0.63 X10(3) UL (ref 0.1–1)
MONOCYTES NFR BLD AUTO: 6.6 %
MONOCYTES NFR BLD AUTO: 7.6 %
NEUTROPHILS # BLD AUTO: 5.65 X10 (3) UL (ref 1.5–7.7)
NEUTROPHILS # BLD AUTO: 5.65 X10(3) UL (ref 1.5–7.7)
NEUTROPHILS # BLD AUTO: 6.91 X10 (3) UL (ref 1.5–7.7)
NEUTROPHILS # BLD AUTO: 6.91 X10(3) UL (ref 1.5–7.7)
NEUTROPHILS NFR BLD AUTO: 68 %
NEUTROPHILS NFR BLD AUTO: 77.1 %
OSMOLALITY SERPL CALC.SUM OF ELEC: 302 MOSM/KG (ref 275–295)
OSMOLALITY SERPL CALC.SUM OF ELEC: 302 MOSM/KG (ref 275–295)
PHOSPHATE SERPL-MCNC: 3.6 MG/DL (ref 2.4–5.1)
PLATELET # BLD AUTO: 153 10(3)UL (ref 150–450)
PLATELET # BLD AUTO: 193 10(3)UL (ref 150–450)
PLATELETS.RETICULATED NFR BLD AUTO: 5.3 % (ref 0–7)
POTASSIUM SERPL-SCNC: 3.7 MMOL/L (ref 3.5–5.1)
POTASSIUM SERPL-SCNC: 3.7 MMOL/L (ref 3.5–5.1)
PROT SERPL-MCNC: 5.8 G/DL (ref 5.7–8.2)
RBC # BLD AUTO: 2.4 X10(6)UL
RBC # BLD AUTO: 2.47 X10(6)UL
SODIUM SERPL-SCNC: 142 MMOL/L (ref 136–145)
SODIUM SERPL-SCNC: 142 MMOL/L (ref 136–145)
UNIT VOLUME: 350 ML
WBC # BLD AUTO: 8.3 X10(3) UL (ref 4–11)
WBC # BLD AUTO: 9 X10(3) UL (ref 4–11)

## 2024-06-15 PROCEDURE — 0W3P8ZZ CONTROL BLEEDING IN GASTROINTESTINAL TRACT, VIA NATURAL OR ARTIFICIAL OPENING ENDOSCOPIC: ICD-10-PCS | Performed by: INTERNAL MEDICINE

## 2024-06-15 PROCEDURE — 99232 SBSQ HOSP IP/OBS MODERATE 35: CPT | Performed by: INTERNAL MEDICINE

## 2024-06-15 DEVICE — REPLAY HEMOSTASIS CLIP, 11MM SPAN
Type: IMPLANTABLE DEVICE | Site: COLON | Status: FUNCTIONAL
Brand: REPLAY

## 2024-06-15 RX ORDER — HYDROMORPHONE HYDROCHLORIDE 1 MG/ML
0.6 INJECTION, SOLUTION INTRAMUSCULAR; INTRAVENOUS; SUBCUTANEOUS EVERY 5 MIN PRN
Status: CANCELLED | OUTPATIENT
Start: 2024-06-15

## 2024-06-15 RX ORDER — NALOXONE HYDROCHLORIDE 0.4 MG/ML
80 INJECTION, SOLUTION INTRAMUSCULAR; INTRAVENOUS; SUBCUTANEOUS AS NEEDED
Status: CANCELLED | OUTPATIENT
Start: 2024-06-15 | End: 2024-06-15

## 2024-06-15 RX ORDER — MORPHINE SULFATE 10 MG/ML
6 INJECTION, SOLUTION INTRAMUSCULAR; INTRAVENOUS EVERY 10 MIN PRN
Status: CANCELLED | OUTPATIENT
Start: 2024-06-15

## 2024-06-15 RX ORDER — SODIUM CHLORIDE, SODIUM LACTATE, POTASSIUM CHLORIDE, CALCIUM CHLORIDE 600; 310; 30; 20 MG/100ML; MG/100ML; MG/100ML; MG/100ML
INJECTION, SOLUTION INTRAVENOUS CONTINUOUS
Status: CANCELLED | OUTPATIENT
Start: 2024-06-15

## 2024-06-15 RX ORDER — MORPHINE SULFATE 4 MG/ML
4 INJECTION, SOLUTION INTRAMUSCULAR; INTRAVENOUS EVERY 10 MIN PRN
Status: CANCELLED | OUTPATIENT
Start: 2024-06-15

## 2024-06-15 RX ORDER — MORPHINE SULFATE 4 MG/ML
2 INJECTION, SOLUTION INTRAMUSCULAR; INTRAVENOUS EVERY 10 MIN PRN
Status: CANCELLED | OUTPATIENT
Start: 2024-06-15

## 2024-06-15 RX ORDER — HYDROMORPHONE HYDROCHLORIDE 1 MG/ML
0.2 INJECTION, SOLUTION INTRAMUSCULAR; INTRAVENOUS; SUBCUTANEOUS EVERY 5 MIN PRN
Status: CANCELLED | OUTPATIENT
Start: 2024-06-15

## 2024-06-15 RX ORDER — DEXTROSE MONOHYDRATE AND SODIUM CHLORIDE 5; .9 G/100ML; G/100ML
INJECTION, SOLUTION INTRAVENOUS CONTINUOUS
Status: DISCONTINUED | OUTPATIENT
Start: 2024-06-15 | End: 2024-06-16

## 2024-06-15 RX ORDER — HYDROMORPHONE HYDROCHLORIDE 1 MG/ML
0.4 INJECTION, SOLUTION INTRAMUSCULAR; INTRAVENOUS; SUBCUTANEOUS EVERY 5 MIN PRN
Status: CANCELLED | OUTPATIENT
Start: 2024-06-15

## 2024-06-15 RX ADMIN — SODIUM CHLORIDE: 9 INJECTION, SOLUTION INTRAVENOUS at 09:50:00

## 2024-06-15 NOTE — PLAN OF CARE
Patient is alert and oriented times 4. Going down for colonoscopy at this time. Fluids running. Able to ambulate stand by assist. Bowels are clear water color. Voided now.   Problem: Patient Centered Care  Goal: Patient preferences are identified and integrated in the patient's plan of care  Description: Interventions:  - What would you like us to know as we care for you? Wants hemoglobin stabilized  - Provide timely, complete, and accurate information to patient/family  - Incorporate patient and family knowledge, values, beliefs, and cultural backgrounds into the planning and delivery of care  - Encourage patient/family to participate in care and decision-making at the level they choose  - Honor patient and family perspectives and choices  Outcome: Progressing     Problem: Patient/Family Goals  Goal: Patient/Family Long Term Goal  Description: Patient's Long Term Goal: To get discharge home    Interventions:  - Help to initiate discharge process and communication  - See additional Care Plan goals for specific interventions  Outcome: Progressing  Goal: Patient/Family Short Term Goal  Description: Patient's Short Term Goal: to get colonoscopy    Interventions:   -Administer schedule prep on time, and prepare patient for colonoscopy  - See additional Care Plan goals for specific interventions  Outcome: Progressing

## 2024-06-15 NOTE — PROGRESS NOTES
[facility]  part of [facility]    Progress Note    Ollie Hernández Patient Status:  Inpatient    1945 MRN [id number]   Location [facility] 4W/SW/SE Attending Wili Parmar MD   Hosp Day # 2 PCP Wili Parmar MD       SUBJECTIVE:  Did not tolerate colon prep well; stools are running clear liquid  Pt still feeling bloated    OBJECTIVE:  /50 (BP Location: Left arm)   Pulse 77   Temp 97.4 °F (36.3 °C) (Oral)   Resp 18   Ht 5' 4\" (1.626 m)   Wt 156 lb 8 oz (71 kg)   SpO2 96%   BMI 26.86 kg/m²     Intake/Output:  I/O last 3 completed shifts:  In: 328.3 [Blood:278.3; IV PIGGYBACK:50]  Out: -     Wt Readings from Last 3 Encounters:   24 156 lb 8 oz (71 kg)   24 150 lb 4.8 oz (68.2 kg)   24 163 lb 9.6 oz (74.2 kg)       Exam  Gen: No acute distress, alert and oriented x3  Pulm: Lungs CTAB, no rhonchi or wheezing  CV: Heart irr no murmurs   Abd: Abdomen soft, moderate distension, NABS  MSK: Full range of motion in extremities, no clubbing, no cyanosis, no edema      Labs:   Recent Labs   Lab 24  1450 24  0340 24  1244 06/15/24  0507   RBC 3.09* 2.20*  --  2.47*   HGB 9.5* 6.7* 7.9* 7.3*   HCT 29.6* 20.8*  --  23.1*   MCV 95.8 94.5  --  93.5   MCH 30.7 30.5  --  29.6   MCHC 32.1 32.2  --  31.6   RDW 18.3* 18.2*  --  19.4*   NEPRELIM 4.04 4.17  --  6.91   WBC 6.3 6.2  --  9.0   .0 185.0  --  193.0         Recent Labs   Lab 24  0849 24  0340 06/15/24  0507   * 123* 137*  137*   BUN 43* 50* 28*  28*   CREATSERUM 4.74* 5.24* 3.99*  3.99*   EGFRCR 12* 11* 15*  15*   CA 9.5 9.0 9.0  9.0   ALB 3.8 3.1* 3.4  3.4    141 142  142   K 4.0 3.7 3.7  3.7    104 103  103   CO2 32.0 29.0 33.0*  33.0*   ALKPHO 57  --  47   AST 25  --  21   ALT 27  --  16   BILT 0.3  --  0.6   TP 6.7  --  5.8         Imaging:  No results found.          Assessment and Plan:           Rectal bleeding   Acute on chronic  anemia  -pt with hx of constipation/hemorrhoids/rectal bleeding in the past (saw GI Dr. Gautam for this in 10/2020)  -last colonoscopy 1/25/21 (Dr. Gautam) -- internal hemorrhoids, colon polyps x 2 (one tubular adenoma)  Recent egd/colonoscopy for GI bleeding 6/1/2024 demonstrating hepatic flexure AVM, which was cauterized  -eliquis on hold  -s/p 1prbc yesterday; Hgb 6.7>7.9>7.3  -appreciate GI consult; plan for colonoscopy this morning        CAD  -C 5/21/2024 by Dr. Luis Orozco: 70% lesion of apical LAD, 80% lesion of LCx  -S/p PCI of LCx (medical management of the LAD lesion)  -s/p aspirin, plavix and eliquis x 1 week, now just plavix and eliquis  -cardiology consulted--resume aspirin and plavix; plan to hold eliquis until completing 1 month DAPT     Pharyngeal dysphagia  --right side hypomobile oropharyngeal dysphagia  - swallow study 4/2024: intermittent shallow and deep laryngeal penetration without aspiration, small zenker's diverticulum, bulky endplate osteophytes contributing to dysphagia  - to see Dr. Del Angel (ENT at [place]) for further imaging     Paroxysmal A-fib  --dx'ed 9/2023  --Zio monitor 10/2023 -- NSVT (normal EF 65%); on carvedilol  - followed by EP Dr. Phelan  --Eliquis on hold     Cervical radiculopathy  Chronic back pain with neuropathy  -CT scan of the cervical neck 7/2019 ; history of cervical decompressive laminectomy with multilevel degenerative disc disease  - s/p physical therapy in the past  --MRI brain and MRI cervical spine done 12/29/22 (MRI brain w/mod chronic microvascular ischemic changes bilat cerebral hemispheres, basal ganglia and thalami; MRI cervical spine w/multilevel spinal stenosis)  --saw NS PA Dr. Araujo in 1/2023; had subsequent MRI thoracic/lumbar spine.  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  Pt was referred for PT in 2/2023.      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 Sevilla  --HgbA1c 5.5 in 9/2023  - Gabapentin for neuropathy  --at home: on Tradjenta 5mg daily (no longer on insulin)  -- SSI in hospital  --BS 140s     ESRD on hemodialysis  - 2/2 prolonged history of diabetes and hypertension  - Follows with Dr. Martinez  -Last hemodialysis with 1 L out 6/5/2024 per Dr. Callahan     Hypertension  - carvedilol 25 mg BID, hydralazine 25mg q8h, felodipine 10mg/day (on amlodipine in hosp due to formulary)     Hyperlipidemia  - Continue with On atorvastatin 40 mg daily, aspirin     BPH  -no longer takes trospium or tamsulosin     Pulmonary hypertension  -Comanagement of KRAIG and chronic diastolic heart failure  - stable     KRAIG on CPAP  - sees pulm Dr. Landeros     Cataracts  -Seems to be stable, follows with Dr. Chase of ophthalmology     Gout  Flareup in 6/2022. NO recurrence  -Seems to be stable     Anemia of ESRD  -Baseline hemoglobin seems to be around 10-11  -managed by nephrology; on aranesp  -Hgb decreased as above     Sensorineural hearing loss  Mild-moderate per audiology testing/14/2022.  He may be a hearing aid candidate in the future     Unsteady Gait  Ongoing fatigue due to multiple comorbidities as above  - uses a walker, 2 wheeled-2 constipation.  Does not want a rollator walker at this time  - He is a fall risk with his unsteady gait, fatigue.     Anxiety  Lexapro 5 mg once a day.              Ebonie Simmons DO  6/15/2024  6:51 AM

## 2024-06-15 NOTE — PLAN OF CARE
Patient has safety precautions in place bed in the lowest position, bed alarm on, and call light within reach. Plan of care ongoing. No further concerns as of present.    Problem: Patient Centered Care  Goal: Patient preferences are identified and integrated in the patient's plan of care  Description: Interventions:  - What would you like us to know as we care for you? Wants hemoglobin stabilized  - Provide timely, complete, and accurate information to patient/family  - Incorporate patient and family knowledge, values, beliefs, and cultural backgrounds into the planning and delivery of care  - Encourage patient/family to participate in care and decision-making at the level they choose  - Honor patient and family perspectives and choices  Outcome: Progressing     Problem: Diabetes/Glucose Control  Goal: Glucose maintained within prescribed range  Description: INTERVENTIONS:  - Monitor Blood Glucose as ordered  - Assess for signs and symptoms of hyperglycemia and hypoglycemia  - Administer ordered medications to maintain glucose within target range  - Assess barriers to adequate nutritional intake and initiate nutrition consult as needed  - Instruct patient on self management of diabetes  Outcome: Progressing     Problem: Patient/Family Goals  Goal: Patient/Family Long Term Goal  Description: Patient's Long Term Goal: To get discharge home    Interventions:  - Help to initiate discharge process and communication  - See additional Care Plan goals for specific interventions  Outcome: Progressing  Goal: Patient/Family Short Term Goal  Description: Patient's Short Term Goal: to get colonoscopy    Interventions:   -Administer schedule prep on time, and prepare patient for colonoscopy  - See additional Care Plan goals for specific interventions  Outcome: Progressing     Problem: PAIN - ADULT  Goal: Verbalizes/displays adequate comfort level or patient's stated pain goal  Description: INTERVENTIONS:  - Encourage pt to  monitor pain and request assistance  - Assess pain using appropriate pain scale  - Administer analgesics based on type and severity of pain and evaluate response  - Implement non-pharmacological measures as appropriate and evaluate response  - Consider cultural and social influences on pain and pain management  - Manage/alleviate anxiety  - Utilize distraction and/or relaxation techniques  - Monitor for opioid side effects  - Notify MD/LIP if interventions unsuccessful or patient reports new pain  - Anticipate increased pain with activity and pre-medicate as appropriate  Outcome: Progressing     Problem: RISK FOR INFECTION - ADULT  Goal: Absence of fever/infection during anticipated neutropenic period  Description: INTERVENTIONS  - Monitor WBC  - Administer growth factors as ordered  - Implement neutropenic guidelines  Outcome: Progressing     Problem: SAFETY ADULT - FALL  Goal: Free from fall injury  Description: INTERVENTIONS:  - Assess pt frequently for physical needs  - Identify cognitive and physical deficits and behaviors that affect risk of falls.  - [place] fall precautions as indicated by assessment.  - Educate pt/family on patient safety including physical limitations  - Instruct pt to call for assistance with activity based on assessment  - Modify environment to reduce risk of injury  - Provide assistive devices as appropriate  - Consider OT/PT consult to assist with strengthening/mobility  - Encourage toileting schedule  Outcome: Progressing     Problem: DISCHARGE PLANNING  Goal: Discharge to home or other facility with appropriate resources  Description: INTERVENTIONS:  - Identify barriers to discharge w/pt and caregiver  - Include patient/family/discharge partner in discharge planning  - Arrange for needed discharge resources and transportation as appropriate  - Identify discharge learning needs (meds, wound care, etc)  - Arrange for interpreters to assist at discharge as needed  - Consider  post-discharge preferences of patient/family/discharge partner  - Complete POLST form as appropriate  - Assess patient's ability to be responsible for managing their own health  - Refer to Case Management Department for coordinating discharge planning if the patient needs post-hospital services based on physician/LIP order or complex needs related to functional status, cognitive ability or social support system  Outcome: Progressing

## 2024-06-15 NOTE — ANESTHESIA POSTPROCEDURE EVALUATION
Patient: Ollie Hernández    Procedure Summary       Date: 06/15/24 Room / Location: [facility] ENDOSCOPY 01 / [facility] ENDOSCOPY    Anesthesia Start: 0948 Anesthesia Stop:     Procedure: COLONOSCOPY Diagnosis: (hemorrhoids, polyp,)    Surgeons: Carlyn Storey MD Anesthesiologist: Jayne Slater MD    Anesthesia Type: MAC ASA Status: 4            Anesthesia Type: MAC    Vitals Value Taken Time   BP 83/54 06/15/24 1021   Temp 97.3 °F (36.3 °C) 06/15/24 1021   Pulse 69 06/15/24 1021   Resp 22 06/15/24 1021   SpO2 98 % 06/15/24 1021   Vitals shown include unfiled device data.    [facility] AN Post Evaluation:   Patient Evaluated in floor  Patient Participation: complete - patient participated  Level of Consciousness: awake  Pain Management: adequate  Airway Patency:patent  Dental exam unchanged from preop  Yes    Nausea/Vomiting: none  Cardiovascular Status: stable  Respiratory Status: room air  Postoperative Hydration stable      JAYNE SLATER MD  6/15/2024 10:22 AM

## 2024-06-15 NOTE — OPERATIVE REPORT
Colonoscopy Operative Report    Ollie Hernández Patient Status:  Inpatient    1945 MRN [id number]   Location [facility] ENDOSCOPY LAB SUITES Attending Wili Parmar MD   Hosp Day #   2 PCP Wili Parmar MD     Pre-Operative Diagnosis: blood in stool    Post-Operative Diagnosis:  1.Terminal Ileum: normal upto 5 cm past ICV  2.Colon   -no active bleeding, some serosanguinous fluid in rectum, otherwise bilious liquid   -ascending colon ulcer with single clip, clean based with small red spot - exacerbated with irrigation but no bleeding - a second clip placed   -small internal hemorrhoids but no active bleeding noted   -small 1-2 mm diminutive polyp at splenic flexure - was not removed due to high risker of bleeding and confounding source if it bleeds post operatively    Procedure Performed: COLONOSCOPY with hemostat clip placement    Informed Consent: Informed consent for both the procedure and sedation were obtained from the patient. The potentially life-threatening complications of sedation, bleeding,  perforation, transfusion or repeat endoscopy  were reviewed along with the possible need for hospitalization, surgical management, transfusion or repeat endoscopy should one of these complications arise. The patient understands and is agreeable to proceed.  Sedation Type: MAC-Patient received sedation with monitored anesthesia provided by an anesthesiologist  Cecum Withdrawal Time:  14 minutes  Date of previous colonoscopy: 1 week ago    Procedure Description: The patient was placed in the left lateral decubitus position.  After careful digital rectal examination, the Pediatric colonoscope was inserted into the rectum and advanced to the level of the cecum under direct visualization. The cecum was identified by landmarks, including the appendiceal orifice and ileoceccal valve. Careful examination of the entire colon was performed during withdrawal of the  endoscope. The scope was withdrawn to the rectum and retroflexion was performed.  The patient tolerated the procedure well with no immediate complications. The patient was transferred to the recovery area in stable condition.  Quality of Preparation: Adequate  [place] Bowel Prep Scale: 3/3/3    Findings: See above (post-operative diagnosis)  Recommendations:   1.Changes to diet: clear liquids then advance as tolerated  2.No changes to medications  3.Repeat colonoscopy in 6-12 months to removed polyps - once able to hold anticoagulation  4.Safe to transfer to floor  Discharge:  The patient was given an after visit summary detailing the procedure, findings, recommendations and follow up plans.  Carlyn Storey MD  6/15/2024  10:20 AM

## 2024-06-15 NOTE — PROGRESS NOTES
[facility]    Progress Note    Ollie Hernández Patient Status:  Inpatient    1931 MRN [id number]   Location [facility] 3W/SW Attending Jesus De Anda MD   Hosp Day # 2 PCP Wili Parmar MD     SUBJECTIVE   Patient is a 79 year old male who was admitted to the hospital for Rectal bleeding:    Overnight:no issues, prep clear    Current Medications:  Current Facility-Administered Medications   Medication Dose Route Frequency    sodium chloride 0.9% infusion   Intravenous Once    sodium chloride 0.9 % IV bolus 100 mL  100 mL Intravenous Q30 Min PRN    And    albumin human (Albumin) 25% injection 25 g  25 g Intravenous PRN Dialysis    simethicone (Mylicon) chewable tab 80 mg  80 mg Oral TID CC and HS    albuterol (Ventolin HFA) 108 (90 Base) MCG/ACT inhaler 2 puff  2 puff Inhalation Q4H PRN    fluticasone propionate (Flonase) 50 MCG/ACT nasal suspension 2 spray  2 spray Nasal Daily    pantoprazole (Protonix) DR tab 40 mg  40 mg Oral QAM AC    cetirizine (ZyrTEC) tab 5 mg  5 mg Oral QOD    tetrahydrozoline (Visine) 0.05 % ophthalmic solution 1 drop  1 drop Both Eyes BID PRN    traMADol (Ultram) tab 50 mg  50 mg Oral Q12H PRN    carvedilol (Coreg) tab 25 mg  25 mg Oral BID    atorvastatin (Lipitor) tab 40 mg  40 mg Oral Nightly    escitalopram (Lexapro) tab 5 mg  5 mg Oral Daily    hydrALAZINE (Apresoline) tab 25 mg  25 mg Oral Q8H STEPHANIE    linaGLIPtin (Tradjenta) tab 5 mg  5 mg Oral Daily    fluticasone furoate-vilanterol (Breo Ellipta) 200-25 MCG/ACT inhaler 1 puff  1 puff Inhalation Daily    methocarbamol (Robaxin) tab 500 mg  500 mg Oral BID PRN    glucose (Dex4) 15 GM/59ML oral liquid 15 g  15 g Oral Q15 Min PRN    Or    glucose (Glutose) 40% oral gel 15 g  15 g Oral Q15 Min PRN    Or    glucose-vitamin C (Dex-4) chewable tab 4 tablet  4 tablet Oral Q15 Min PRN    Or    dextrose 50% injection 50 mL  50 mL Intravenous Q15 Min PRN    Or    glucose (Dex4) 15 GM/59ML oral liquid 30 g  30 g Oral  Q15 Min PRN    Or    glucose (Glutose) 40% oral gel 30 g  30 g Oral Q15 Min PRN    Or    glucose-vitamin C (Dex-4) chewable tab 8 tablet  8 tablet Oral Q15 Min PRN    acetaminophen (Tylenol Extra Strength) tab 500 mg  500 mg Oral Q4H PRN    melatonin tab 3 mg  3 mg Oral Nightly PRN    polyethylene glycol (PEG 3350) (Miralax) 17 g oral packet 17 g  17 g Oral Daily PRN    sennosides (Senokot) tab 17.2 mg  17.2 mg Oral Nightly PRN    bisacodyl (Dulcolax) 10 MG rectal suppository 10 mg  10 mg Rectal Daily PRN    ondansetron (Zofran) 4 MG/2ML injection 4 mg  4 mg Intravenous Q6H PRN    metoclopramide (Reglan) 5 mg/mL injection 5 mg  5 mg Intravenous Q8H PRN    insulin aspart (NovoLOG) 100 Units/mL FlexPen 1-5 Units  1-5 Units Subcutaneous TID CC    albumin human (Albumin) 25% injection 25 g  25 g Intravenous PRN Dialysis    clopidogrel (Plavix) tab 75 mg  75 mg Oral Daily    aspirin DR tab 81 mg  81 mg Oral Daily       Medications Prior to Admission   Medication Sig    linaGLIPtin (TRADJENTA) 5 mg Oral Tab Take 1 tablet (5 mg total) by mouth daily.    hydrALAZINE 25 MG Oral Tab Take 1 tablet (25 mg total) by mouth every 8 (eight) hours.    clopidogrel 75 MG Oral Tab Take 1 tablet (75 mg total) by mouth daily.    escitalopram 5 MG Oral Tab Take 1 tablet (5 mg total) by mouth daily.    carvedilol 25 MG Oral Tab Take 1 tablet (25 mg total) by mouth 2 (two) times daily.    atorvastatin 40 MG Oral Tab Take 1 tablet (40 mg total) by mouth nightly.    apixaban 5 MG Oral Tab Take 1 tablet (5 mg total) by mouth 2 (two) times daily.    acetaminophen 500 MG Oral Tab Take 1 tablet (500 mg total) by mouth daily as needed for Pain.    cetirizine 10 MG Oral Tab Take 1 tablet (10 mg total) by mouth every other day.    Cholecalciferol 125 MCG (5000 UT) Oral Tab Take 1 tablet (5,000 Units total) by mouth 2 (two) times daily.    tetrahydrozoline 0.05 % Ophthalmic Solution 1 drop 2 (two) times daily as needed.    felodipine ER 10 MG Oral  Tablet 24 Hr Take 1 tablet (10 mg total) by mouth daily.    fluticasone propionate 50 MCG/ACT Nasal Suspension 2 sprays by Nasal route daily.    pantoprazole 40 MG Oral Tab EC Take 1 tablet (40 mg total) by mouth every morning before breakfast.    albuterol (PROAIR HFA) 108 (90 Base) MCG/ACT Inhalation Aero Soln Inhale 2 puffs into the lungs every 4 (four) hours as needed for Wheezing.    Budesonide-Formoterol Fumarate (SYMBICORT) 160-4.5 MCG/ACT Inhalation Aerosol Inhale 2 puffs into the lungs 2 (two) times daily. (Patient taking differently: Inhale 2 puffs into the lungs 2 (two) times daily as needed.)    Darbepoetin Randell 60 MCG/ML Injection Solution Inject into the vein as needed.    polyethylene glycol, PEG 3350, 17 g Oral Powd Pack 17 g Wed, Thurs, Sat    traMADol 50 MG Oral Tab Take 1 tablet (50 mg total) by mouth every 12 (twelve) hours as needed for Pain.    Glucose Blood (CONTOUR NEXT TEST) In Vitro Strip Test 3 times daily    methocarbamol 500 MG Oral Tab Take 1 tablet (500 mg total) by mouth 2 (two) times daily as needed. (Patient not taking: Reported on 6/13/2024)    Insulin Pen Needle (PEN NEEDLES) 32G X 4 MM Does not apply Misc 1 each daily.         Allergies  Allergies   Allergen Reactions    Adhesive Tape OTHER (SEE COMMENTS)     Severe rashes    Dust Mite Extract RASH       Physical Exam:   Blood pressure 116/50, pulse 77, temperature 97.4 °F (36.3 °C), temperature source Oral, resp. rate 18, height 5' 4\" (1.626 m), weight 156 lb 8 oz (71 kg), SpO2 96%.    General appearance:  alert, appears stated age and cooperative    HEENT: negative findings: lids and lashes normal and conjunctivae and sclerae normal.     Pulmonary: clear to auscultation bilaterally of anterior chest    Cardiovascular: regular rate and rhythm    Abdominal: soft, bowel sounds present; non-distended, non-tender    Extremities: No edema, cyanosis, or clubbing    Skin: warm and dry    Neurologic: Grossly normal    Psychiatric:  calm      Results:     Laboratory Data:  Lab Results   Component Value Date    WBC 9.0 06/15/2024    HGB 7.3 (L) 06/15/2024    HCT 23.1 (L) 06/15/2024    .0 06/15/2024    CREATSERUM 3.99 (H) 06/15/2024    CREATSERUM 3.99 (H) 06/15/2024    BUN 28 (H) 06/15/2024    BUN 28 (H) 06/15/2024     06/15/2024     06/15/2024    K 3.7 06/15/2024    K 3.7 06/15/2024     06/15/2024     06/15/2024    CO2 33.0 (H) 06/15/2024    CO2 33.0 (H) 06/15/2024     (H) 06/15/2024     (H) 06/15/2024    CA 9.0 06/15/2024    CA 9.0 06/15/2024    ALB 3.4 06/15/2024    ALB 3.4 06/15/2024    ALKPHO 47 06/15/2024    TP 5.8 06/15/2024    AST 21 06/15/2024    ALT 16 06/15/2024    T4F 0.9 08/31/2022    TSH 2.060 06/23/2023     (H) 07/25/2023    PSA 2.94 10/20/2021    ESRML 79 (H) 03/16/2016    CRP 0.36 03/16/2016    MG 2.1 11/20/2023    PHOS 3.6 06/15/2024    TROP <0.045 07/25/2019     08/05/2023    B12 631 08/05/2023       Component      Latest Ref Rng 6/13/2024 6/14/2024 6/15/2024   WBC      4.0 - 11.0 x10(3) uL 5.4  6.2  9.0    RBC      3.80 - 5.80 x10(6)uL 3.11 (L)  2.20 (L)  2.47 (L)    Hemoglobin      13.0 - 17.5 g/dL 9.4 (L)  7.9 (L)  7.3 (L)    Hemoglobin        6.7 (LL)     Hematocrit      39.0 - 53.0 % 30.1 (L)  20.8 (L)  23.1 (L)    MCV      80.0 - 100.0 fL 96.8  94.5  93.5    MCH      26.0 - 34.0 pg 30.2  30.5  29.6    MCHC      31.0 - 37.0 g/dL 31.2  32.2  31.6    RDW-SD      35.1 - 46.3 fL 63.8 (H)  62.8 (H)  64.8 (H)    RDW      11.0 - 15.0 % 18.2 (H)  18.2 (H)  19.4 (H)    Platelet Count      150.0 - 450.0 10(3)uL 229.0  185.0  193.0    Prelim Neutrophil Abs      1.50 - 7.70 x10 (3) uL 3.66  4.17  6.91    Neutrophils Absolute      1.50 - 7.70 x10(3) uL 3.66  4.17  6.91    Lymphocytes Absolute      1.00 - 4.00 x10(3) uL 1.05  1.33  1.28    Monocytes Absolute      0.10 - 1.00 x10(3) uL 0.51  0.46  0.59    Eosinophils Absolute      0.00 - 0.70 x10(3) uL 0.12  0.16  0.09     Basophils Absolute      0.00 - 0.20 x10(3) uL 0.06  0.05  0.06    Immature Granulocyte Absolute      0.00 - 1.00 x10(3) uL 0.04  0.04  0.03    Neutrophils %      % 67.3  67.2  77.1    Lymphocytes %      % 19.3  21.4  14.3    Monocytes %      % 9.4  7.4  6.6    Eosinophils %      % 2.2  2.6  1.0    Basophils %      % 1.1  0.8  0.7    Immature Granulocyte %      % 0.7  0.6  0.3    Glucose      70 - 99 mg/dL 127 (H)   137 (H)    Glucose      70 - 99 mg/dL   137 (H)    Sodium      136 - 145 mmol/L 145   142    Sodium      136 - 145 mmol/L   142    Potassium      3.5 - 5.1 mmol/L 4.0   3.7    Potassium      3.5 - 5.1 mmol/L   3.7    Chloride      98 - 112 mmol/L 107   103    Chloride      98 - 112 mmol/L   103    Carbon Dioxide, Total      21.0 - 32.0 mmol/L 32.0   33.0 (H)    Carbon Dioxide, Total      21.0 - 32.0 mmol/L   33.0 (H)    ANION GAP      0 - 18 mmol/L 6   6    ANION GAP      0 - 18 mmol/L   6    BUN      9 - 23 mg/dL 43 (H)   28 (H)    BUN      9 - 23 mg/dL   28 (H)    CREATININE      0.70 - 1.30 mg/dL 4.74 (H)   3.99 (H)    CREATININE      0.70 - 1.30 mg/dL   3.99 (H)    BUN/CREATININE RATIO      10.0 - 20.0  9.1 (L)   7.0 (L)    BUN/CREATININE RATIO      10.0 - 20.0    7.0 (L)    CALCIUM      8.7 - 10.4 mg/dL 9.5   9.0    CALCIUM      8.7 - 10.4 mg/dL   9.0    CALCULATED OSMOLALITY      275 - 295 mOsm/kg 312 (H)   302 (H)    CALCULATED OSMOLALITY      275 - 295 mOsm/kg   302 (H)    EGFR      >=60 mL/min/1.73m2 12 (L)   15 (L)    EGFR      >=60 mL/min/1.73m2   15 (L)    ALT (SGPT)      10 - 49 U/L 27   16    AST (SGOT)      <=34 U/L 25   21    ALKALINE PHOSPHATASE      45 - 117 U/L 57   47    Total Bilirubin      0.2 - 1.1 mg/dL 0.3   0.6    PROTEIN, TOTAL      5.7 - 8.2 g/dL 6.7   5.8    Albumin      3.2 - 4.8 g/dL 3.8   3.4    Albumin      3.2 - 4.8 g/dL   3.4    Globulin      2.0 - 3.5 g/dL 2.9   2.4    A/G Ratio      1.0 - 2.0  1.3   1.4    PHOSPHORUS      2.4 - 5.1 mg/dL   3.6       Legend:  (L)  Low  (H) High  (LL) Low Panic    Imaging:  No results found.         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)    Atherosclerosis of native arteries of extremities with intermittent claudication, bilateral legs (HCC)    Primary hypertension    Smokers' cough (HCC)    Unstable angina (HCC)    Lower GI bleed    ESRD (end stage renal disease) on dialysis (HCC)    Atrial fibrillation (HCC)    AVM (arteriovenous malformation) of colon    Anemia, blood loss    Rectal bleeding    Anticoagulated    Antiplatelet or antithrombotic long-term use     Rectal bleeding              -likely hemorrhoidal and based on streaking stool, lack of lh, reassuring hbg, and high risk of stent thrombus best to observe, recent right sided AVM - treated but now with persistence of bleeding concern for recurrence of lesion                          -plan for OC                           -q12 cbc                          -hold anticoagultion                          -clear liquid/bowel prep/npo after midnight     Constipation              -miralax daily              -no straining or sitting on toilet for prolonged periods     CAD s/p recent PCI              -bleeding risk is now greater, but will continue antiplt therapy due to cardiac risk and get colonoscopy today - patient and fam aware higher risk of bleeding     Plan for  colonoscopy with possible biopsies and possible polypectomy.  The procedure, indications, and risks (including perforation, bleeding (higher on antiplt therapy), infection, and sedation related complications) were discussed.      Thank you for allowing me to participate in the care of your patient.     Time spent in direct patient contact and decision making as well as counseling/coordination of care:  50 minutes    Note to patient: The 21st Century Cures Act makes medical notes like these available to patients in the interest of transparency. However, this is a medical document intended as peer to peer communication. It is written in medical language and may contain abbreviations or verbiage that are unfamiliar. It may appear blunt or direct. Medical documents are intended to carry relevant information, facts as evident, and the clinical opinion of the practitioner.      Carlyn Storey MD  [facility], GI  6/15/2024  7:45 AM

## 2024-06-15 NOTE — ANESTHESIA PREPROCEDURE EVALUATION
Anesthesia PreOp Note    HPI:     Ollie Hernández is a 79 year old male who presents for preoperative consultation requested by: Carlyn Storey MD    Date of Surgery: 6/13/2024 - 6/15/2024    Procedure(s):  COLONOSCOPY  Indication: blood in stool    Relevant Problems   No relevant active problems       NPO:  Last Liquid Consumption Date: 06/15/24  Last Liquid Consumption Time: 0820  Last Solid Consumption Date: 06/14/24  Last Solid Consumption Time: 0000  Last Liquid Consumption Date: 06/15/24          History Review:  Patient Active Problem List    Diagnosis Date Noted    Rectal bleeding 06/13/2024    Anticoagulated 06/13/2024    Antiplatelet or antithrombotic long-term use 06/13/2024    Atrial fibrillation (HCC) 06/05/2024    AVM (arteriovenous malformation) of colon 06/05/2024    Anemia, blood loss 06/05/2024    Lower GI bleed 06/01/2024    ESRD (end stage renal disease) on dialysis ([facility]) 06/01/2024    Unstable angina ([facility]) 05/22/2024    Smokers' cough ([facility]) 02/29/2024    Primary hypertension     Secondary hyperparathyroidism ([facility]) 02/10/2022    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 ([facility]) 02/10/2022    Atherosclerosis of native arteries of extremities with intermittent claudication, bilateral legs ([facility]) 02/10/2022    Chronic obstructive asthma ([facility]) 11/14/2021    Vitamin D deficiency 02/01/2021    Chronic diastolic congestive heart failure ([facility]) 03/02/2020    Anemia of chronic renal failure 12/04/2019    KRAIG (obstructive sleep apnea) 04/25/2017    Pulmonary HTN ([facility]) 03/08/2016    Mixed hyperlipidemia 02/11/2016    Gout 11/25/2013    Type 2 diabetes mellitus with chronic kidney disease on chronic dialysis, with long-term current use of insulin ([facility]) 03/10/2005       Past Medical History:    Anemia    Asthma ([facility])    Back problem    BPH (benign prostatic hyperplasia)    Calculus of kidney    Cataract    Diabetes ([facility])    ESRD (end  stage renal disease) on dialysis (HCC)    Essential hypertension    High blood pressure    High cholesterol    History of blood transfusion    Hyperlipidemia    Neuropathy    hands and feet    KRAIG on CPAP    Renal disorder    Sleep apnea    Visual impairment    glasses    Vocal cord paralysis, unilateral partial       Past Surgical History:   Procedure Laterality Date    Appendectomy      1981    Back surgery      Neck/back - 1998    Capsule endoscopy - internal referral      Cataract      12/2021 and 1/2022    Colonoscopy      Colonoscopy N/A 1/25/2021    Procedure: COLONOSCOPY;  Surgeon: Michael Gautam MD;  Location: [facility] ENDO    Colonoscopy N/A 6/3/2024    Procedure: COLONOSCOPY;  Surgeon: Gabriel Saldana MD;  Location: [facility] ENDOSCOPY    Hand/finger surgery unlisted      Accidental trauma    Upper gi endoscopy,diagnosis         Medications Prior to Admission   Medication Sig Dispense Refill Last Dose    linaGLIPtin (TRADJENTA) 5 mg Oral Tab Take 1 tablet (5 mg total) by mouth daily. 90 tablet 1 6/12/2024    hydrALAZINE 25 MG Oral Tab Take 1 tablet (25 mg total) by mouth every 8 (eight) hours. 90 tablet 1 6/12/2024    clopidogrel 75 MG Oral Tab Take 1 tablet (75 mg total) by mouth daily. 90 tablet 1 6/12/2024    escitalopram 5 MG Oral Tab Take 1 tablet (5 mg total) by mouth daily. 90 tablet 3 6/12/2024    carvedilol 25 MG Oral Tab Take 1 tablet (25 mg total) by mouth 2 (two) times daily.   6/12/2024    atorvastatin 40 MG Oral Tab Take 1 tablet (40 mg total) by mouth nightly. 90 tablet 3 6/12/2024    apixaban 5 MG Oral Tab Take 1 tablet (5 mg total) by mouth 2 (two) times daily.   6/12/2024    acetaminophen 500 MG Oral Tab Take 1 tablet (500 mg total) by mouth daily as needed for Pain.   6/12/2024    cetirizine 10 MG Oral Tab Take 1 tablet (10 mg total) by mouth every other day.   6/12/2024    Cholecalciferol 125 MCG (5000 UT) Oral Tab Take 1 tablet (5,000 Units total) by mouth 2 (two) times daily.   6/12/2024     tetrahydrozoline 0.05 % Ophthalmic Solution 1 drop 2 (two) times daily as needed.   6/12/2024    felodipine ER 10 MG Oral Tablet 24 Hr Take 1 tablet (10 mg total) by mouth daily. 90 tablet 3 6/12/2024    fluticasone propionate 50 MCG/ACT Nasal Suspension 2 sprays by Nasal route daily. 48 g 3 6/12/2024    pantoprazole 40 MG Oral Tab EC Take 1 tablet (40 mg total) by mouth every morning before breakfast. 90 tablet 3 6/12/2024    albuterol (PROAIR HFA) 108 (90 Base) MCG/ACT Inhalation Aero Soln Inhale 2 puffs into the lungs every 4 (four) hours as needed for Wheezing. 3 each 3 6/12/2024    Budesonide-Formoterol Fumarate (SYMBICORT) 160-4.5 MCG/ACT Inhalation Aerosol Inhale 2 puffs into the lungs 2 (two) times daily. (Patient taking differently: Inhale 2 puffs into the lungs 2 (two) times daily as needed.) 3 each 3 6/12/2024    Darbepoetin Randell 60 MCG/ML Injection Solution Inject into the vein as needed.   Past Month    polyethylene glycol, PEG 3350, 17 g Oral Powd Pack 17 g Wed, Thurs, Sat   Unknown    traMADol 50 MG Oral Tab Take 1 tablet (50 mg total) by mouth every 12 (twelve) hours as needed for Pain.   More than a month    Glucose Blood (CONTOUR NEXT TEST) In Vitro Strip Test 3 times daily 300 each 1     methocarbamol 500 MG Oral Tab Take 1 tablet (500 mg total) by mouth 2 (two) times daily as needed. (Patient not taking: Reported on 6/13/2024) 60 tablet 1 Not Taking    Insulin Pen Needle (PEN NEEDLES) 32G X 4 MM Does not apply Misc 1 each daily. 100 each 0      Current Facility-Administered Medications Ordered in Epic   Medication Dose Route Frequency Provider Last Rate Last Admin    dextrose 5%-sodium chloride 0.9% infusion   Intravenous Continuous Ebonie Simmons DO   Paused at 06/15/24 0922    sodium chloride 0.9% infusion   Intravenous Once Wili Parmar MD        sodium chloride 0.9 % IV bolus 100 mL  100 mL Intravenous Q30 Min PRN Elke Martinez MD        And    albumin human (Albumin) 25% injection 25 g   25 g Intravenous PRN Dialysis Elke Martinez MD        simethicone (Mylicon) chewable tab 80 mg  80 mg Oral TID CC and HS Ebonie Simmons, DO   80 mg at 06/15/24 0820    albuterol (Ventolin HFA) 108 (90 Base) MCG/ACT inhaler 2 puff  2 puff Inhalation Q4H PRN Wili Parmar MD        fluticasone propionate (Flonase) 50 MCG/ACT nasal suspension 2 spray  2 spray Nasal Daily Wili Parmar MD   2 spray at 06/15/24 0830    pantoprazole (Protonix) DR tab 40 mg  40 mg Oral QAM AC Wili Parmar MD   40 mg at 06/14/24 0519    cetirizine (ZyrTEC) tab 5 mg  5 mg Oral QOD Wili Parmar MD   5 mg at 06/13/24 1425    tetrahydrozoline (Visine) 0.05 % ophthalmic solution 1 drop  1 drop Both Eyes BID PRN Wili Parmar MD        traMADol (Ultram) tab 50 mg  50 mg Oral Q12H PRN Wili Parmar MD   50 mg at 06/14/24 1305    carvedilol (Coreg) tab 25 mg  25 mg Oral BID Wili Parmar MD   25 mg at 06/15/24 0818    atorvastatin (Lipitor) tab 40 mg  40 mg Oral Nightly Wili Parmar MD   40 mg at 06/14/24 2047    escitalopram (Lexapro) tab 5 mg  5 mg Oral Daily Wili Parmar MD   5 mg at 06/13/24 1425    hydrALAZINE (Apresoline) tab 25 mg  25 mg Oral Q8H STEPHANIE Wili Parmar MD   25 mg at 06/14/24 1305    linaGLIPtin (Tradjenta) tab 5 mg  5 mg Oral Daily Wili Parmar MD   5 mg at 06/13/24 1425    fluticasone furoate-vilanterol (Breo Ellipta) 200-25 MCG/ACT inhaler 1 puff  1 puff Inhalation Daily Wili Parmar MD   1 puff at 06/15/24 0930    methocarbamol (Robaxin) tab 500 mg  500 mg Oral BID PRN Wili Parmar MD        glucose (Dex4) 15 GM/59ML oral liquid 15 g  15 g Oral Q15 Min PRN Wili Parmar MD        Or    glucose (Glutose) 40% oral gel 15 g  15 g Oral Q15 Min Wili Shanks MD        Or    glucose-vitamin C (Dex-4) chewable tab 4 tablet  4 tablet Oral Q15 Min Wili Shanks MD        Or    dextrose 50% injection 50 mL  50 mL Intravenous Q15 Min Wili Shanks MD        Or    glucose (Dex4) 15 GM/59ML oral liquid 30  g  30 g Oral Q15 Min PRN Wili Parmar MD        Or    glucose (Glutose) 40% oral gel 30 g  30 g Oral Q15 Min PRN Wili Parmar MD        Or    glucose-vitamin C (Dex-4) chewable tab 8 tablet  8 tablet Oral Q15 Min PRN Wili Parmar MD        acetaminophen (Tylenol Extra Strength) tab 500 mg  500 mg Oral Q4H PRN Wili Parmar MD        melatonin tab 3 mg  3 mg Oral Nightly PRN Wili Parmar MD   3 mg at 06/13/24 2121    polyethylene glycol (PEG 3350) (Miralax) 17 g oral packet 17 g  17 g Oral Daily PRN Wili Parmar MD        sennosides (Senokot) tab 17.2 mg  17.2 mg Oral Nightly PRN Wili Parmar MD        bisacodyl (Dulcolax) 10 MG rectal suppository 10 mg  10 mg Rectal Daily PRN Wili Parmar MD        ondansetron (Zofran) 4 MG/2ML injection 4 mg  4 mg Intravenous Q6H PRN Wili Parmar MD        metoclopramide (Reglan) 5 mg/mL injection 5 mg  5 mg Intravenous Q8H PRN Wili Parmar MD        insulin aspart (NovoLOG) 100 Units/mL FlexPen 1-5 Units  1-5 Units Subcutaneous TID CC Wili Parmar MD   2 Units at 06/13/24 1801    albumin human (Albumin) 25% injection 25 g  25 g Intravenous PRN Dialysis Elke Martinez MD        clopidogrel (Plavix) tab 75 mg  75 mg Oral Daily Luis Orozco MD   75 mg at 06/15/24 0820    aspirin DR tab 81 mg  81 mg Oral Daily Luis Orozco MD   81 mg at 06/15/24 0820     No current Epic-ordered outpatient medications on file.       Allergies   Allergen Reactions    Adhesive Tape OTHER (SEE COMMENTS)     Severe rashes    Dust Mite Extract RASH       Family History   Problem Relation Age of Onset    Cancer Father         Kidney    Breast Cancer Mother     Diabetes Mother     Diabetes Maternal Grandmother     Diabetes Maternal Grandfather     Heart Disorder Other         Uncle     Social History     Socioeconomic History    Marital status:    Tobacco Use    Smoking status: Former     Current packs/day: 0.00     Average packs/day: 1 pack/day for 17.0 years (17.0 ttl  pk-yrs)     Types: Cigarettes     Start date: 1964     Quit date: 1981     Years since quittin.4    Smokeless tobacco: Never   Vaping Use    Vaping status: Never Used   Substance and Sexual Activity    Alcohol use: No     Alcohol/week: 0.0 standard drinks of alcohol    Drug use: No    Sexual activity: Yes     Partners: Female       Available pre-op labs reviewed.  Lab Results   Component Value Date    WBC 9.0 06/15/2024    RBC 2.47 (L) 06/15/2024    HGB 7.3 (L) 06/15/2024    HCT 23.1 (L) 06/15/2024    MCV 93.5 06/15/2024    MCH 29.6 06/15/2024    MCHC 31.6 06/15/2024    RDW 19.4 (H) 06/15/2024    .0 06/15/2024     Lab Results   Component Value Date     06/15/2024     06/15/2024    K 3.7 06/15/2024    K 3.7 06/15/2024     06/15/2024     06/15/2024    CO2 33.0 (H) 06/15/2024    CO2 33.0 (H) 06/15/2024    BUN 28 (H) 06/15/2024    BUN 28 (H) 06/15/2024    CREATSERUM 3.99 (H) 06/15/2024    CREATSERUM 3.99 (H) 06/15/2024     (H) 06/15/2024     (H) 06/15/2024    PGLU 152 (H) 06/15/2024    CA 9.0 06/15/2024    CA 9.0 06/15/2024          Vital Signs:  Body mass index is 26.86 kg/m².   height is 1.626 m (5' 4\") and weight is 71 kg (156 lb 8 oz). His oral temperature is 98.3 °F (36.8 °C). His blood pressure is 133/53 and his pulse is 76. His respiration is 21 and oxygen saturation is 98%.   Vitals:    24 2208 06/15/24 0403 06/15/24 0813 06/15/24 0857   BP: 119/49 116/50 137/43 133/53   Pulse: 75 77  76   Resp:  18 18 21   Temp:  97.4 °F (36.3 °C) 98.3 °F (36.8 °C)    TempSrc:  Oral Oral    SpO2:  96% 98% 98%   Weight:       Height:            Anesthesia Evaluation     Patient summary reviewed and Nursing notes reviewed    Airway   Mallampati: II  TM distance: >3 FB  Neck ROM: full  Dental      Pulmonary - normal exam   (+) COPD moderate, asthma, sleep apnea    ROS comment: Vocal cord paralisis / choking fluid and food   Cardiovascular - normal exam  Exercise  tolerance: good  (+) hypertension, CAD, CHF    NYHA Classification: II  ECG reviewed  ROS comment: CAD / angio / PTCA / stents 5.24   Assessment/Plan:     1. ABLA Hgb 11.5 (5/22/24) -> 9.8   - Reviewed records: Hgb appears somewhat variable but baseline appears to be 10-11.  - Underwent colonoscopy s/p APC of bleeding lesion and removal of sessile polyp.  2. CAD - PCI LCX with Monclova Ola ABIMBOLA x 1 (5/21/24)  - Restarted Eliquis + plavix on 6/5/24  - Eliquis stopped on 6/13/24 (last dose by patient 6/12/24 pm).  3. PAF on Eliquis  4. NSVT on Zio patch  5. Chronic HFpEF - compensated  6. ESRD on HD MWF  7. DM2  8. HTN  9. KRAIG on CPAP  10. LVEF 60% (echo 9/23)     Plan  1. Risk of life threatening stent thrombosis outweighs risk of life threatening hemorrhage at present.   2. Restarted DAPT given above given risk of instent thrombosis in this time period. Bleeding may be residual effects of Eliquis.  3. Repeat Hgb and transfuse for < 8.  4. Can do DAPT x 1 month as he received a Ramin Ola ABIMBOLA (FDA approved for shorter course of DAPT).  5. Can transition to ASA + Eliquis after 1 month of DAPT for a total duration of 6 months.  6. Hypertension continue home meds.         Neuro/Psych    (-) TIA, CVA    GI/Hepatic/Renal    (+) chronic renal disease CRI    Endo/Other    (+) diabetes mellitus type 2 well controlled using insulin  (-) hypothyroidism    Comments:    Abdominal  - normal exam                 Anesthesia Plan:   ASA:  4  Plan:   MAC  Informed Consent Plan and Risks Discussed With:  Patient and spouse  Discussed plan with:  Attending and surgeon  Provider Attestation (if preop done by other):  MAC      I have informed Ollie Hernández and/or legal guardian or family member of the nature of the anesthetic plan, benefits, risks including possible dental damage if relevant, major complications, and any alternative forms of anesthetic management.   All of the patient's questions were answered to the best of  my ability. The patient desires the anesthetic management as planned.  JAYNE SLATER MD  6/15/2024 9:30 AM  Present on Admission:  **None**

## 2024-06-15 NOTE — PROGRESS NOTES
Cardiology Progress Note  [facility]    Ollie Hernández Patient Status:  Emergency    1945 MRN [id number]   Location [facility] EMERGENCY DEPARTMENT Attending Atnonio Nunes MD   Hosp Day # 2 PCP Wili Parmar MD     Reason for Consultation:  Bleeding    History of Present Illness:  Ollie Hernández is a a(n) 79 year old male with coronary artery disease and PCI to circumflex (2024), paroxysmal atrial fibrillation on Eliquis, chronic HFpEF, ESRD on hemodialysis who presents with recurrent rectal bleeding.    His PCI was on 2024.  He was discharged on 1 week of triple therapy with aspirin 81 mg, Plavix, Eliquis.  Patient then stopped aspirin as planned.  He presented to [facility] on 2024 with rectal bleeding for 3 days while on dual antithrombotic therapy with Plavix and Eliquis.  Anticoagulation was held while patient underwent endoscopy, and he had APC of bleeding lesion and removal of a sessile polyp.    Placed back on Plavix plus Eliquis (restarted 24) and discharged.  He felt well without any bleeding until this morning at 7 AM, when he had a large bloody bowel movement with bright red blood.    Subjective:  Patient had bloody stool during dialysis with drop in Hgb to 6.7. He received 1U pRBCs.    Assessment/Plan:    ABLA Hgb 11.5 (24) -> 9.8   - Reviewed records: Hgb appears somewhat variable but baseline appears to be 10-11.  - Underwent colonoscopy s/p APC of bleeding lesion and removal of sessile polyp.  CAD - PCI LCX with Ramin Lee ABIMBOLA x 1 (24)  - Restarted Eliquis + plavix on 24  - Eliquis stopped on 24 (last dose by patient 24 pm).  PAF on Eliquis  NSVT on Zio patch  Chronic HFpEF - compensated  ESRD on HD MWF  DM2  HTN  KRAIG on CPAP  LVEF 60% (echo )    Plan  Risk of life threatening stent thrombosis outweighs risk of life threatening hemorrhage at present.   Restarted DAPT given above given risk of instent thrombosis in  this time period. Bleeding may be residual effects of Eliquis.  Repeat Hgb and transfuse for < 8.  Can do DAPT x 1 month as he received a Ramin [place] ABIMBOLA (FDA approved for shorter course of DAPT).  Can transition to ASA + Eliquis after 1 month of DAPT for a total duration of 6 months.  Hypertension continue home meds.    6/15/2024  No angina no sign of heart failure  DAPT has been resumed  Maintaining sinus rhythm acceptable to hold Eliquis  No absolute CV contraindication to necessary GI procedure    History:  Past Medical History:    Anemia    Asthma (HCC)    Back problem    BPH (benign prostatic hyperplasia)    Calculus of kidney    Cataract    Diabetes (HCC)    ESRD (end stage renal disease) on dialysis (HCC)    Essential hypertension    High blood pressure    High cholesterol    History of blood transfusion    Hyperlipidemia    Neuropathy    hands and feet    KRAIG on CPAP    Renal disorder    Sleep apnea    Visual impairment    glasses    Vocal cord paralysis, unilateral partial     Past Surgical History:   Procedure Laterality Date    Appendectomy      1981    Back surgery      Neck/back - 1998    Capsule endoscopy - internal referral      Cataract      12/2021 and 1/2022    Colonoscopy      Colonoscopy N/A 1/25/2021    Procedure: COLONOSCOPY;  Surgeon: Michael Gautam MD;  Location: [facility] ENDO    Colonoscopy N/A 6/3/2024    Procedure: COLONOSCOPY;  Surgeon: Gabriel Saldana MD;  Location: [facility] ENDOSCOPY    Hand/finger surgery unlisted      Accidental trauma    Upper gi endoscopy,diagnosis       Family History   Problem Relation Age of Onset    Cancer Father         Kidney    Breast Cancer Mother     Diabetes Mother     Diabetes Maternal Grandmother     Diabetes Maternal Grandfather     Heart Disorder Other         Uncle      reports that he quit smoking about 43 years ago. His smoking use included cigarettes. He started smoking about 60 years ago. He has a 17 pack-year smoking history. He has never used  smokeless tobacco. He reports that he does not drink alcohol and does not use drugs.    Allergies:  Allergies   Allergen Reactions    Adhesive Tape OTHER (SEE COMMENTS)     Severe rashes    Dust Mite Extract RASH       Medications:  Current Facility-Administered Medications on File Prior to Encounter   Medication Dose Route Frequency Provider Last Rate Last Admin    [COMPLETED] iron sucrose (Venofer) 300 mg in sodium chloride 0.9% 250 mL IVPB  300 mg Intravenous Daily José Callahan .7 mL/hr at 24 1133 300 mg at 24 1133    [COMPLETED] polyethylene glycol-electrolyte (Golytely) 236 g oral solution 2,000 mL  2,000 mL Oral Once Emilio Talley MD   2,000 mL at 24 0456    [COMPLETED] polyethylene glycol-electrolyte (Golytely) 236 g oral solution 2,000 mL  2,000 mL Oral Once Emilio Talley MD   2,000 mL at 24 1800    [] sodium chloride 0.9 % IV bolus 100 mL  100 mL Intravenous Q30 Min PRN Walker Klein MD        And    [] albumin human (Albumin) 25% injection 25 g  25 g Intravenous PRN Dialysis Walker Klein MD        [COMPLETED] acetaminophen (Tylenol Extra Strength) tab 1,000 mg  1,000 mg Oral Once Devaughn Montiel, DO   1,000 mg at 24 0345    [COMPLETED] sodium chloride 0.9% infusion   Intravenous On Call Luis Orozco MD   Stopped at 24 0000    [COMPLETED] lidocaine PF (Xylocaine-MPF) 2 % injection             [COMPLETED] heparin in sodium chloride 0.9% (Porcine) 2 Units/mL flush bag premix             [COMPLETED] heparin in sodium chloride 0.9% (Porcine) 2 Units/mL flush bag premix             [COMPLETED] fentaNYL (Sublimaze) 50 mcg/mL injection             [COMPLETED] midazolam (Versed) 2 MG/2ML injection             [COMPLETED] heparin (Porcine) 1000 UNIT/ML injection             [COMPLETED] Nitroglycerin in D5W 200-5 MCG/ML-% injection             [COMPLETED] midazolam (Versed) 2 MG/2ML injection             [COMPLETED] iohexol (Omnipaque)  300 MG/ML injection 350 mL  350 mL Intravenous ONCE PRN Luis Orozco MD   230 mL at 24 1601    [] sodium chloride 0.9% infusion   Intravenous Continuous Luis Orozco MD   Stopped at 24 0000     Current Outpatient Medications on File Prior to Encounter   Medication Sig Dispense Refill    linaGLIPtin (TRADJENTA) 5 mg Oral Tab Take 1 tablet (5 mg total) by mouth daily. 90 tablet 1    hydrALAZINE 25 MG Oral Tab Take 1 tablet (25 mg total) by mouth every 8 (eight) hours. 90 tablet 1    clopidogrel 75 MG Oral Tab Take 1 tablet (75 mg total) by mouth daily. 90 tablet 1    escitalopram 5 MG Oral Tab Take 1 tablet (5 mg total) by mouth daily. 90 tablet 3    carvedilol 25 MG Oral Tab Take 1 tablet (25 mg total) by mouth 2 (two) times daily.      atorvastatin 40 MG Oral Tab Take 1 tablet (40 mg total) by mouth nightly. 90 tablet 3    apixaban 5 MG Oral Tab Take 1 tablet (5 mg total) by mouth 2 (two) times daily.      acetaminophen 500 MG Oral Tab Take 1 tablet (500 mg total) by mouth daily as needed for Pain.      cetirizine 10 MG Oral Tab Take 1 tablet (10 mg total) by mouth every other day.      Cholecalciferol 125 MCG (5000 UT) Oral Tab Take 1 tablet (5,000 Units total) by mouth 2 (two) times daily.      tetrahydrozoline 0.05 % Ophthalmic Solution 1 drop 2 (two) times daily as needed.      felodipine ER 10 MG Oral Tablet 24 Hr Take 1 tablet (10 mg total) by mouth daily. 90 tablet 3    fluticasone propionate 50 MCG/ACT Nasal Suspension 2 sprays by Nasal route daily. 48 g 3    pantoprazole 40 MG Oral Tab EC Take 1 tablet (40 mg total) by mouth every morning before breakfast. 90 tablet 3    albuterol (PROAIR HFA) 108 (90 Base) MCG/ACT Inhalation Aero Soln Inhale 2 puffs into the lungs every 4 (four) hours as needed for Wheezing. 3 each 3    Budesonide-Formoterol Fumarate (SYMBICORT) 160-4.5 MCG/ACT Inhalation Aerosol Inhale 2 puffs into the lungs 2 (two) times daily. (Patient taking differently: Inhale 2  puffs into the lungs 2 (two) times daily as needed.) 3 each 3    Darbepoetin Randell 60 MCG/ML Injection Solution Inject into the vein as needed.      polyethylene glycol, PEG 3350, 17 g Oral Powd Pack 17 g Wed, Thurs, Sat      traMADol 50 MG Oral Tab Take 1 tablet (50 mg total) by mouth every 12 (twelve) hours as needed for Pain.      Glucose Blood (CONTOUR NEXT TEST) In Vitro Strip Test 3 times daily 300 each 1    methocarbamol 500 MG Oral Tab Take 1 tablet (500 mg total) by mouth 2 (two) times daily as needed. (Patient not taking: Reported on 6/13/2024) 60 tablet 1    Insulin Pen Needle (PEN NEEDLES) 32G X 4 MM Does not apply Misc 1 each daily. 100 each 0       Review of Systems:  Constitutional: denies fevers, chills, night sweats  HEENT: denies headache, vision changes, trouble or pain with swallowing  Cardiac: denies chest pain, palpitations, edema  Pulm: denies dyspnea, cough, wheeze  GI: denies n/v, abd pain, diarrhea or constipation  : denies hematuria, dysuria, incontinence  MSK: denies muscle or joint pains  Neuro: denies numbness, weakness, paresthesias  Psych: denies anxiety, depression  Integument: denies skin rashes or lesions  Heme: denies easy bruising or bleeding  Endo: denies heat/cold intolerance, skin or nail changes      Physical Exam:  Blood pressure 137/43, pulse 77, temperature 98.3 °F (36.8 °C), temperature source Oral, resp. rate 18, height 5' 4\" (1.626 m), weight 156 lb 8 oz (71 kg), SpO2 98%.  Wt Readings from Last 3 Encounters:   06/13/24 156 lb 8 oz (71 kg)   06/05/24 150 lb 4.8 oz (68.2 kg)   05/28/24 163 lb 9.6 oz (74.2 kg)       General: awake, alert, oriented x 3, no acute distress  HEENT: at/nc, perrl, eomi  Neck: No JVD, carotids 2+ no bruits.  Cardiac: Regular rate and rhythm, S1, S2 normal, no murmur, rub or gallop.  Lungs: Clear without wheezes, rales, rhonchi or dullness.  Normal excursions and effort.  Abdomen: Soft, non-tender, non-distended, normal bowel sounds    Extremities: Without clubbing, cyanosis or edema.  Peripheral pulses are 2+.  Neurologic: Alert and oriented, normal affect.  Psych: normal mood and affect  Skin: Warm and dry.       Laboratories and Data:  Diagnostics:      Labs:   CBC:    Lab Results   Component Value Date    WBC 9.0 06/15/2024    WBC 6.2 06/14/2024    WBC 6.3 06/13/2024     Lab Results   Component Value Date    HGB 7.3 (L) 06/15/2024    HGB 7.9 (L) 06/14/2024    HGB 6.7 (LL) 06/14/2024      Lab Results   Component Value Date    .0 06/15/2024    .0 06/14/2024    .0 06/13/2024     BMP:   No results found for: \"GLUCOSE\"  Lab Results   Component Value Date    K 3.7 06/15/2024    K 3.7 06/15/2024    K 3.7 06/14/2024     Lab Results   Component Value Date    BUN 28 (H) 06/15/2024    BUN 28 (H) 06/15/2024    BUN 50 (H) 06/14/2024     Lab Results   Component Value Date    CREATSERUM 3.99 (H) 06/15/2024    CREATSERUM 3.99 (H) 06/15/2024    CREATSERUM 5.24 (H) 06/14/2024     Cholesterol:     Lab Results   Component Value Date    CHOLEST 170 06/23/2023    CHOLEST 157 11/23/2022    CHOLEST 168 08/31/2022     Lab Results   Component Value Date    HDL 43 06/23/2023    HDL 36 (L) 11/23/2022    HDL 43 08/31/2022     Lab Results   Component Value Date    TRIG 132 06/23/2023    TRIG 143 11/23/2022    TRIG 109 08/31/2022     Lab Results   Component Value Date     (H) 06/23/2023    LDL 96 11/23/2022     (H) 08/31/2022     Lab Results   Component Value Date    AST 21 06/15/2024    AST 25 06/13/2024    AST 25 06/01/2024     Lab Results   Component Value Date    ALT 16 06/15/2024    ALT 27 06/13/2024    ALT 26 06/01/2024

## 2024-06-16 ENCOUNTER — APPOINTMENT (OUTPATIENT)
Dept: ULTRASOUND IMAGING | Facility: HOSPITAL | Age: 79
DRG: 377 | End: 2024-06-16
Attending: INTERNAL MEDICINE

## 2024-06-16 LAB
ALBUMIN SERPL-MCNC: 3 G/DL (ref 3.2–4.8)
ALBUMIN/GLOB SERPL: 1.4 {RATIO} (ref 1–2)
ALP LIVER SERPL-CCNC: 43 U/L
ALT SERPL-CCNC: 12 U/L
ANION GAP SERPL CALC-SCNC: 7 MMOL/L (ref 0–18)
AST SERPL-CCNC: 16 U/L (ref ?–34)
BASOPHILS # BLD AUTO: 0.03 X10(3) UL (ref 0–0.2)
BASOPHILS NFR BLD AUTO: 0.4 %
BILIRUB SERPL-MCNC: 0.5 MG/DL (ref 0.2–1.1)
BUN BLD-MCNC: 30 MG/DL (ref 9–23)
BUN/CREAT SERPL: 5.9 (ref 10–20)
CALCIUM BLD-MCNC: 9 MG/DL (ref 8.7–10.4)
CHLORIDE SERPL-SCNC: 101 MMOL/L (ref 98–112)
CO2 SERPL-SCNC: 31 MMOL/L (ref 21–32)
CREAT BLD-MCNC: 5.07 MG/DL
CRP SERPL-MCNC: 1.3 MG/DL (ref ?–1)
DEPRECATED HBV CORE AB SER IA-ACNC: 882.6 NG/ML
DEPRECATED RDW RBC AUTO: 63.7 FL (ref 35.1–46.3)
EGFRCR SERPLBLD CKD-EPI 2021: 11 ML/MIN/1.73M2 (ref 60–?)
EOSINOPHIL # BLD AUTO: 0.12 X10(3) UL (ref 0–0.7)
EOSINOPHIL NFR BLD AUTO: 1.7 %
ERYTHROCYTE [DISTWIDTH] IN BLOOD BY AUTOMATED COUNT: 18.8 % (ref 11–15)
ERYTHROCYTE [SEDIMENTATION RATE] IN BLOOD: 10 MM/HR
GLOBULIN PLAS-MCNC: 2.2 G/DL (ref 2–3.5)
GLUCOSE BLD-MCNC: 106 MG/DL (ref 70–99)
GLUCOSE BLDC GLUCOMTR-MCNC: 118 MG/DL (ref 70–99)
GLUCOSE BLDC GLUCOMTR-MCNC: 130 MG/DL (ref 70–99)
GLUCOSE BLDC GLUCOMTR-MCNC: 161 MG/DL (ref 70–99)
GLUCOSE BLDC GLUCOMTR-MCNC: 170 MG/DL (ref 70–99)
HCT VFR BLD AUTO: 20.2 %
HGB BLD-MCNC: 6.4 G/DL
HGB BLD-MCNC: 7.4 G/DL
IMM GRANULOCYTES # BLD AUTO: 0.03 X10(3) UL (ref 0–1)
IMM GRANULOCYTES NFR BLD: 0.4 %
IRON SATN MFR SERPL: 40 %
IRON SERPL-MCNC: 62 UG/DL
LYMPHOCYTES # BLD AUTO: 1.4 X10(3) UL (ref 1–4)
LYMPHOCYTES NFR BLD AUTO: 19.6 %
MCH RBC QN AUTO: 29.9 PG (ref 26–34)
MCHC RBC AUTO-ENTMCNC: 31.7 G/DL (ref 31–37)
MCV RBC AUTO: 94.4 FL
MONOCYTES # BLD AUTO: 0.48 X10(3) UL (ref 0.1–1)
MONOCYTES NFR BLD AUTO: 6.7 %
NEUTROPHILS # BLD AUTO: 5.07 X10 (3) UL (ref 1.5–7.7)
NEUTROPHILS # BLD AUTO: 5.07 X10(3) UL (ref 1.5–7.7)
NEUTROPHILS NFR BLD AUTO: 71.2 %
OSMOLALITY SERPL CALC.SUM OF ELEC: 295 MOSM/KG (ref 275–295)
PLATELET # BLD AUTO: 175 10(3)UL (ref 150–450)
POTASSIUM SERPL-SCNC: 3.2 MMOL/L (ref 3.5–5.1)
PROT SERPL-MCNC: 5.2 G/DL (ref 5.7–8.2)
RBC # BLD AUTO: 2.14 X10(6)UL
SODIUM SERPL-SCNC: 139 MMOL/L (ref 136–145)
TIBC SERPL-MCNC: 156 UG/DL (ref 250–425)
TRANSFERRIN SERPL-MCNC: 105 MG/DL (ref 215–365)
VIT B12 SERPL-MCNC: 1237 PG/ML (ref 211–911)
WBC # BLD AUTO: 7.1 X10(3) UL (ref 4–11)

## 2024-06-16 PROCEDURE — 99232 SBSQ HOSP IP/OBS MODERATE 35: CPT | Performed by: INTERNAL MEDICINE

## 2024-06-16 PROCEDURE — 93971 EXTREMITY STUDY: CPT | Performed by: INTERNAL MEDICINE

## 2024-06-16 RX ORDER — SODIUM CHLORIDE 9 MG/ML
INJECTION, SOLUTION INTRAVENOUS ONCE
Status: COMPLETED | OUTPATIENT
Start: 2024-06-16 | End: 2024-06-16

## 2024-06-16 RX ORDER — ALBUMIN (HUMAN) 12.5 G/50ML
25 SOLUTION INTRAVENOUS
Status: DISCONTINUED | OUTPATIENT
Start: 2024-06-17 | End: 2024-06-18

## 2024-06-16 NOTE — PROGRESS NOTES
[facility]  part of [facility]    Progress Note    Ollie Hernández Patient Status:  Inpatient    1945 MRN [id number]   Location [facility] 4W/SW/SE Attending Wili Parmar MD   Hosp Day # 3 PCP Wili Parmar MD       SUBJECTIVE:  Pt doing ok  Took a while to wake up from anesthesia  Feeling gas, nauseated --gasx is helping  Currently receiving transfusion    OBJECTIVE:  /62 (BP Location: Left arm)   Pulse 77   Temp 98.1 °F (36.7 °C) (Oral)   Resp 20   Ht 5' 4\" (1.626 m)   Wt 156 lb 8 oz (71 kg)   SpO2 97%   BMI 26.86 kg/m²     Intake/Output:  I/O last 3 completed shifts:  In: -   Out: 300 [Stool:300]    Wt Readings from Last 3 Encounters:   24 156 lb 8 oz (71 kg)   24 150 lb 4.8 oz (68.2 kg)   24 163 lb 9.6 oz (74.2 kg)       Exam  Gen: No acute distress, alert and oriented x3  Pulm: Lungs CTAB, no rhonchi or wheezing  CV: Heart IRR  Abd: Abdomen soft, mildly distended  MSK: +RLE edema; no LLE edema  Skin: no rashes or lesions  Neuro: A&O x 3    Labs:   Recent Labs   Lab 06/15/24  0507 06/15/24  1657 24  0436   RBC 2.47* 2.40* 2.14*   HGB 7.3* 7.3* 6.4*   HCT 23.1* 22.0* 20.2*   MCV 93.5 91.7 94.4   MCH 29.6 30.4 29.9   MCHC 31.6 33.2 31.7   RDW 19.4* 19.0* 18.8*   NEPRELIM 6.91 5.65 5.07   WBC 9.0 8.3 7.1   .0 153.0 175.0         Recent Labs   Lab 24  0849 24  0340 06/15/24  0507 24  0436   * 123* 137*  137* 106*   BUN 43* 50* 28*  28* 30*   CREATSERUM 4.74* 5.24* 3.99*  3.99* 5.07*   EGFRCR 12* 11* 15*  15* 11*   CA 9.5 9.0 9.0  9.0 9.0   ALB 3.8 3.1* 3.4  3.4 3.0*    141 142  142 139   K 4.0 3.7 3.7  3.7 3.2*    104 103  103 101   CO2 32.0 29.0 33.0*  33.0* 31.0   ALKPHO 57  --  47 43*   AST 25  --  21 16   ALT 27  --  16 12   BILT 0.3  --  0.6 0.5   TP 6.7  --  5.8 5.2*         Imaging:  No results found.          Assessment and Plan:       Rectal bleeding   Acute on chronic  anemia  -pt with hx of constipation/hemorrhoids/rectal bleeding in the past (saw GI Dr. Gautam for this in 10/2020)  -last colonoscopy 1/25/21 (Dr. Gautam) -- internal hemorrhoids, colon polyps x 2 (one tubular adenoma)  Recent egd/colonoscopy for GI bleeding 6/1/2024 demonstrating hepatic flexure AVM, which was cauterized  -eliquis on hold  -s/p 1prbc 6/14/24; Hgb 6.7>7.9>7.3>6.4; receiving 1u PRBC today  -appreciate GI consult  -colonoscopy 6/15/2024: no active bleeding; ascending colon ulcer with single clip-clean based with small red spot, exacerbated by irrigation and second clip placed; small internal hemorrhoids-no bleeding, small 1-2mm splenic flexure polyp (not removed d/t high risk bleeding)    RLE edema  Check venous doppler    Abdominal gaseous distension  Gasx TID and HS     CAD  -LHC 5/21/2024 by Dr. Luis Orozco: 70% lesion of apical LAD, 80% lesion of LCx  -S/p PCI of LCx (medical management of the LAD lesion)  -s/p aspirin, plavix and eliquis x 1 week, now just plavix and eliquis  -cardiology consulted--resume aspirin and plavix; plan to hold eliquis until completing 1 month DAPT     Pharyngeal dysphagia  --right side hypomobile oropharyngeal dysphagia  - swallow study 4/2024: intermittent shallow and deep laryngeal penetration without aspiration, small zenker's diverticulum, bulky endplate osteophytes contributing to dysphagia  - to see Dr. Del Angel (ENT at [facility]) for further imaging     Paroxysmal A-fib  --dx'ed 9/2023  --Zio monitor 10/2023 -- NSVT (normal EF 65%); on carvedilol  - followed by EP Dr. Phelan  --Eliquis on hold     Cervical radiculopathy  Chronic back pain with neuropathy  -CT scan of the cervical neck 7/2019 ; history of cervical decompressive laminectomy with multilevel degenerative disc disease  - s/p physical therapy in the past  --MRI brain and MRI cervical spine done 12/29/22 (MRI brain w/mod chronic microvascular ischemic changes bilat cerebral hemispheres, basal ganglia and  thalami; MRI cervical spine w/multilevel spinal stenosis)  --saw VINICIO Araujo in 1/2023; had subsequent MRI thoracic/lumbar spine.  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  Pt was referred for PT in 2/2023.      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 Sevilla  --HgbA1c 5.5 in 9/2023  - Gabapentin for neuropathy  --at home: on Tradjenta 5mg daily (no longer on insulin)  -- SSI in hospital  --BS 140s     ESRD on hemodialysis  - 2/2 prolonged history of diabetes and hypertension  - Follows with Dr. Martinez  -Last hemodialysis with 1 L out 6/5/2024 per Dr. Callahan     Hypertension  - carvedilol 25 mg BID, hydralazine 25mg q8h, felodipine 10mg/day (on amlodipine in hosp due to formulary)     Hyperlipidemia  - Continue with On atorvastatin 40 mg daily, aspirin     BPH  -no longer takes trospium or tamsulosin     Pulmonary hypertension  -Comanagement of KRAIG and chronic diastolic heart failure  - stable     KRAIG on CPAP  - sees pulm Dr. Landeros     Cataracts  -Seems to be stable, follows with Dr. Chase of ophthalmology     Gout  Flareup in 6/2022. NO recurrence  -Seems to be stable     Anemia of ESRD  -Baseline hemoglobin seems to be around 10-11  -managed by nephrology; on aranesp  -Hgb decreased as above     Sensorineural hearing loss  Mild-moderate per audiology testing/14/2022.  He may be a hearing aid candidate in the future     Unsteady Gait  Ongoing fatigue due to multiple comorbidities as above  - uses a walker, 2 wheeled-2 constipation.  Does not want a rollator walker at this time  - He is a fall risk with his unsteady gait, fatigue.     Anxiety  Lexapro 5 mg once a day.                Ebonie Simmons DO  6/16/2024  8:46 AM

## 2024-06-16 NOTE — PROGRESS NOTES
Cardiology Progress Note  [facility]    Ollie Hernández Patient Status:  Emergency    1945 MRN [id number]   Location [facility] EMERGENCY DEPARTMENT Attending Antonio Nunes MD   Hosp Day # 3 PCP Wili Parmar MD     Reason for Consultation:  Bleeding    History of Present Illness:  Ollie Hernández is a a(n) 79 year old male with coronary artery disease and PCI to circumflex (2024), paroxysmal atrial fibrillation on Eliquis, chronic HFpEF, ESRD on hemodialysis who presents with recurrent rectal bleeding.    His PCI was on 2024.  He was discharged on 1 week of triple therapy with aspirin 81 mg, Plavix, Eliquis.  Patient then stopped aspirin as planned.  He presented to [facility] on 2024 with rectal bleeding for 3 days while on dual antithrombotic therapy with Plavix and Eliquis.  Anticoagulation was held while patient underwent endoscopy, and he had APC of bleeding lesion and removal of a sessile polyp.    Placed back on Plavix plus Eliquis (restarted 24) and discharged.  He felt well without any bleeding until this morning at 7 AM, when he had a large bloody bowel movement with bright red blood.    Subjective:  Patient had bloody stool during dialysis with drop in Hgb to 6.7. He received 1U pRBCs.    Assessment/Plan:    ABLA Hgb 11.5 (24) -> 9.8   - Reviewed records: Hgb appears somewhat variable but baseline appears to be 10-11.  - Underwent colonoscopy s/p APC of bleeding lesion and removal of sessile polyp.  CAD - PCI LCX with Ramin [place] ABIMBOLA x 1 (24)  - Restarted Eliquis + plavix on 24  - Eliquis stopped on 24 (last dose by patient 24 pm).  PAF on Eliquis  NSVT on Zio patch  Chronic HFpEF - compensated  ESRD on HD MWF  DM2  HTN  KRAIG on CPAP  LVEF 60% (echo )    Plan  Risk of life threatening stent thrombosis outweighs risk of life threatening hemorrhage at present.   Restarted DAPT given above given risk of instent thrombosis in  this time period. Bleeding may be residual effects of Eliquis.  Repeat Hgb and transfuse for < 8.  Can do DAPT x 1 month as he received a Ramin [place] ABIMBOLA (FDA approved for shorter course of DAPT).  Can transition to ASA + Eliquis after 1 month of DAPT for a total duration of 6 months.  Hypertension continue home meds.    6/16/2024  No angina no sign of heart failure  DAPT has been resumed  Tolerated GI procedure  Transfusion today  Continue to hold Eliquis    History:  Past Medical History:    Anemia    Asthma (HCC)    Back problem    BPH (benign prostatic hyperplasia)    Calculus of kidney    Cataract    Diabetes (HCC)    ESRD (end stage renal disease) on dialysis (HCC)    Essential hypertension    High blood pressure    High cholesterol    History of blood transfusion    Hyperlipidemia    Neuropathy    hands and feet    KRAIG on CPAP    Renal disorder    Sleep apnea    Visual impairment    glasses    Vocal cord paralysis, unilateral partial     Past Surgical History:   Procedure Laterality Date    Appendectomy      1981    Back surgery      Neck/back - 1998    Capsule endoscopy - internal referral      Cataract      12/2021 and 1/2022    Colonoscopy      Colonoscopy N/A 1/25/2021    Procedure: COLONOSCOPY;  Surgeon: Michael Gautam MD;  Location: [facility] ENDO    Colonoscopy N/A 6/3/2024    Procedure: COLONOSCOPY;  Surgeon: Gabriel Saldana MD;  Location: [facility] ENDOSCOPY    Hand/finger surgery unlisted      Accidental trauma    Upper gi endoscopy,diagnosis       Family History   Problem Relation Age of Onset    Cancer Father         Kidney    Breast Cancer Mother     Diabetes Mother     Diabetes Maternal Grandmother     Diabetes Maternal Grandfather     Heart Disorder Other         Uncle      reports that he quit smoking about 43 years ago. His smoking use included cigarettes. He started smoking about 60 years ago. He has a 17 pack-year smoking history. He has never used smokeless tobacco. He reports that he does not  drink alcohol and does not use drugs.    Allergies:  Allergies   Allergen Reactions    Adhesive Tape OTHER (SEE COMMENTS)     Severe rashes    Dust Mite Extract RASH       Medications:  Current Facility-Administered Medications on File Prior to Encounter   Medication Dose Route Frequency Provider Last Rate Last Admin    [COMPLETED] iron sucrose (Venofer) 300 mg in sodium chloride 0.9% 250 mL IVPB  300 mg Intravenous Daily José Callahan .7 mL/hr at 24 1133 300 mg at 24 1133    [COMPLETED] polyethylene glycol-electrolyte (Golytely) 236 g oral solution 2,000 mL  2,000 mL Oral Once Emilio Talley MD   2,000 mL at 24 0456    [COMPLETED] polyethylene glycol-electrolyte (Golytely) 236 g oral solution 2,000 mL  2,000 mL Oral Once Emilio Talley MD   2,000 mL at 24 1800    [] sodium chloride 0.9 % IV bolus 100 mL  100 mL Intravenous Q30 Min PRN Walker Klein MD        And    [] albumin human (Albumin) 25% injection 25 g  25 g Intravenous PRN Dialysis Walker Klein MD        [COMPLETED] acetaminophen (Tylenol Extra Strength) tab 1,000 mg  1,000 mg Oral Once Devaughn Montiel, DO   1,000 mg at 24 0345    [COMPLETED] sodium chloride 0.9% infusion   Intravenous On Call Luis Orozco MD   Stopped at 24 0000    [COMPLETED] lidocaine PF (Xylocaine-MPF) 2 % injection             [COMPLETED] heparin in sodium chloride 0.9% (Porcine) 2 Units/mL flush bag premix             [COMPLETED] heparin in sodium chloride 0.9% (Porcine) 2 Units/mL flush bag premix             [COMPLETED] fentaNYL (Sublimaze) 50 mcg/mL injection             [COMPLETED] midazolam (Versed) 2 MG/2ML injection             [COMPLETED] heparin (Porcine) 1000 UNIT/ML injection             [COMPLETED] Nitroglycerin in D5W 200-5 MCG/ML-% injection             [COMPLETED] midazolam (Versed) 2 MG/2ML injection             [COMPLETED] iohexol (Omnipaque) 300 MG/ML injection 350 mL  350 mL Intravenous  ONCE PRN Luis Orozco MD   230 mL at 24 1601    [] sodium chloride 0.9% infusion   Intravenous Continuous Luis Orozco MD   Stopped at 24 0000     Current Outpatient Medications on File Prior to Encounter   Medication Sig Dispense Refill    linaGLIPtin (TRADJENTA) 5 mg Oral Tab Take 1 tablet (5 mg total) by mouth daily. 90 tablet 1    hydrALAZINE 25 MG Oral Tab Take 1 tablet (25 mg total) by mouth every 8 (eight) hours. 90 tablet 1    clopidogrel 75 MG Oral Tab Take 1 tablet (75 mg total) by mouth daily. 90 tablet 1    escitalopram 5 MG Oral Tab Take 1 tablet (5 mg total) by mouth daily. 90 tablet 3    carvedilol 25 MG Oral Tab Take 1 tablet (25 mg total) by mouth 2 (two) times daily.      atorvastatin 40 MG Oral Tab Take 1 tablet (40 mg total) by mouth nightly. 90 tablet 3    apixaban 5 MG Oral Tab Take 1 tablet (5 mg total) by mouth 2 (two) times daily.      acetaminophen 500 MG Oral Tab Take 1 tablet (500 mg total) by mouth daily as needed for Pain.      cetirizine 10 MG Oral Tab Take 1 tablet (10 mg total) by mouth every other day.      Cholecalciferol 125 MCG (5000 UT) Oral Tab Take 1 tablet (5,000 Units total) by mouth 2 (two) times daily.      tetrahydrozoline 0.05 % Ophthalmic Solution 1 drop 2 (two) times daily as needed.      felodipine ER 10 MG Oral Tablet 24 Hr Take 1 tablet (10 mg total) by mouth daily. 90 tablet 3    fluticasone propionate 50 MCG/ACT Nasal Suspension 2 sprays by Nasal route daily. 48 g 3    pantoprazole 40 MG Oral Tab EC Take 1 tablet (40 mg total) by mouth every morning before breakfast. 90 tablet 3    albuterol (PROAIR HFA) 108 (90 Base) MCG/ACT Inhalation Aero Soln Inhale 2 puffs into the lungs every 4 (four) hours as needed for Wheezing. 3 each 3    Budesonide-Formoterol Fumarate (SYMBICORT) 160-4.5 MCG/ACT Inhalation Aerosol Inhale 2 puffs into the lungs 2 (two) times daily. (Patient taking differently: Inhale 2 puffs into the lungs 2 (two) times daily as  needed.) 3 each 3    Darbepoetin Randell 60 MCG/ML Injection Solution Inject into the vein as needed.      polyethylene glycol, PEG 3350, 17 g Oral Powd Pack 17 g Wed, Thurs, Sat      traMADol 50 MG Oral Tab Take 1 tablet (50 mg total) by mouth every 12 (twelve) hours as needed for Pain.      Glucose Blood (CONTOUR NEXT TEST) In Vitro Strip Test 3 times daily 300 each 1    methocarbamol 500 MG Oral Tab Take 1 tablet (500 mg total) by mouth 2 (two) times daily as needed. (Patient not taking: Reported on 6/13/2024) 60 tablet 1    Insulin Pen Needle (PEN NEEDLES) 32G X 4 MM Does not apply Misc 1 each daily. 100 each 0       Review of Systems:  Constitutional: denies fevers, chills, night sweats  HEENT: denies headache, vision changes, trouble or pain with swallowing  Cardiac: denies chest pain, palpitations, edema  Pulm: denies dyspnea, cough, wheeze  GI: denies n/v, abd pain, diarrhea or constipation  : denies hematuria, dysuria, incontinence  MSK: denies muscle or joint pains  Neuro: denies numbness, weakness, paresthesias  Psych: denies anxiety, depression  Integument: denies skin rashes or lesions  Heme: denies easy bruising or bleeding  Endo: denies heat/cold intolerance, skin or nail changes      Physical Exam:  Blood pressure 131/72, pulse 73, temperature 98.2 °F (36.8 °C), temperature source Oral, resp. rate 20, height 5' 4\" (1.626 m), weight 156 lb 8 oz (71 kg), SpO2 98%.  Wt Readings from Last 3 Encounters:   06/13/24 156 lb 8 oz (71 kg)   06/05/24 150 lb 4.8 oz (68.2 kg)   05/28/24 163 lb 9.6 oz (74.2 kg)       General: awake, alert, oriented x 3, no acute distress  HEENT: at/nc, perrl, eomi  Neck: No JVD, carotids 2+ no bruits.  Cardiac: Regular rate and rhythm, S1, S2 normal, no murmur, rub or gallop.  Lungs: Clear without wheezes, rales, rhonchi or dullness.  Normal excursions and effort.  Abdomen: Soft, non-tender, non-distended, normal bowel sounds   Extremities: Without clubbing, cyanosis or edema.   Peripheral pulses are 2+.  Neurologic: Alert and oriented, normal affect.  Psych: normal mood and affect  Skin: Warm and dry.       Laboratories and Data:  Diagnostics:      Labs:   CBC:    Lab Results   Component Value Date    WBC 7.1 06/16/2024    WBC 8.3 06/15/2024    WBC 9.0 06/15/2024     Lab Results   Component Value Date    HGB 6.4 (LL) 06/16/2024    HGB 7.3 (L) 06/15/2024    HGB 7.3 (L) 06/15/2024      Lab Results   Component Value Date    .0 06/16/2024    .0 06/15/2024    .0 06/15/2024     BMP:   No results found for: \"GLUCOSE\"  Lab Results   Component Value Date    K 3.2 (L) 06/16/2024    K 3.7 06/15/2024    K 3.7 06/15/2024     Lab Results   Component Value Date    BUN 30 (H) 06/16/2024    BUN 28 (H) 06/15/2024    BUN 28 (H) 06/15/2024     Lab Results   Component Value Date    CREATSERUM 5.07 (H) 06/16/2024    CREATSERUM 3.99 (H) 06/15/2024    CREATSERUM 3.99 (H) 06/15/2024     Cholesterol:     Lab Results   Component Value Date    CHOLEST 170 06/23/2023    CHOLEST 157 11/23/2022    CHOLEST 168 08/31/2022     Lab Results   Component Value Date    HDL 43 06/23/2023    HDL 36 (L) 11/23/2022    HDL 43 08/31/2022     Lab Results   Component Value Date    TRIG 132 06/23/2023    TRIG 143 11/23/2022    TRIG 109 08/31/2022     Lab Results   Component Value Date     (H) 06/23/2023    LDL 96 11/23/2022     (H) 08/31/2022     Lab Results   Component Value Date    AST 16 06/16/2024    AST 21 06/15/2024    AST 25 06/13/2024     Lab Results   Component Value Date    ALT 12 06/16/2024    ALT 16 06/15/2024    ALT 27 06/13/2024

## 2024-06-16 NOTE — PLAN OF CARE
Problem: HEMATOLOGIC - ADULT  Goal: Maintains hematologic stability  Description: INTERVENTIONS  - Assess for signs and symptoms of bleeding or hemorrhage  - Monitor labs and vital signs for trends  - Administer supportive blood products/factors, fluids and medications as ordered and appropriate  - Administer supportive blood products/factors as ordered and appropriate  Outcome: Progressing     Problem: SAFETY ADULT - FALL  Goal: Free from fall injury  Description: INTERVENTIONS:  - Assess pt frequently for physical needs  - Identify cognitive and physical deficits and behaviors that affect risk of falls.  - [place] fall precautions as indicated by assessment.  - Educate pt/family on patient safety including physical limitations  - Instruct pt to call for assistance with activity based on assessment  - Modify environment to reduce risk of injury  - Provide assistive devices as appropriate  Outcome: Progressing  1 Unit PRBC transfused for hgb 6.4. Post transfusion hgb increased to 7.4. No signs of active bleeding noted or reported by pt. Continue ASA and plavix. Eliquis on hold. CARY fistula with (+) bruit and thrill. HD dilma for tomorrow. Patient tolerates FLD and soft diet. Will advance diet to general for mayela. Patient ambulates with stand by assist.   Family at the bedside.

## 2024-06-16 NOTE — PROGRESS NOTES
[facility]  part of [facility]    Progress Note    Ollie Hernández Patient Status:  Inpatient    1945 MRN [id number]   Location [facility] 4W/SW/SE Attending Wili Parmar MD   Hosp Day # 3 PCP Wili Parmar MD       Subjective:   Ollie Hernández is a(n) 79 year old male who I am seeing for ESRD      Resting    Objective:   /48 (BP Location: Left arm)   Pulse 66   Temp 98.3 °F (36.8 °C) (Oral)   Resp 20   Ht 5' 4\" (1.626 m)   Wt 156 lb 8 oz (71 kg)   SpO2 97%   BMI 26.86 kg/m²      Intake/Output Summary (Last 24 hours) at 2024 1459  Last data filed at 2024 1159  Gross per 24 hour   Intake 570 ml   Output --   Net 570 ml     Wt Readings from Last 1 Encounters:   24 156 lb 8 oz (71 kg)       Exam  Gen: No acute distress, Heent: NC AT, mucous memb clear, neck supple  Pulm: Lungs clear, normal respiratory effort  CV: Heart with regular rate and rhythm, no edema  Abd: Abdomen soft, nontender, nondistended, no organomegaly, bowel sounds present  Skin: no symptoms reported  Psych: alert and oriented    Assessment and Plan:     1 - ESRD  HD tomorrow     2 -GI bleed/anemia  Still requring PRBC  Internal hemmrhoids     3 - HTN w ESRD  He is on hydralazine, felodipine     4 -secondary hyperparathyroidism  Monitor calcium and phosphorus     5 -atrial fibrillation/coronary artery disease  Cardiology following.               Results:     Recent Labs   Lab 06/15/24  0507 06/15/24  1657 24  0436 24  1055   RBC 2.47* 2.40* 2.14*  --    HGB 7.3* 7.3* 6.4* 7.4*   HCT 23.1* 22.0* 20.2*  --    MCV 93.5 91.7 94.4  --    MCH 29.6 30.4 29.9  --    MCHC 31.6 33.2 31.7  --    RDW 19.4* 19.0* 18.8*  --    NEPRELIM 6.91 5.65 5.07  --    WBC 9.0 8.3 7.1  --    .0 153.0 175.0  --          Recent Labs   Lab 24  0340 06/15/24  0507 24  0436   * 137*  137* 106*   BUN 50* 28*  28* 30*   CREATSERUM 5.24* 3.99*  3.99* 5.07*   CA 9.0 9.0  9.0  9.0    142  142 139   K 3.7 3.7  3.7 3.2*    103  103 101   CO2 29.0 33.0*  33.0* 31.0          US VENOUS DOPPLER LEG RIGHT - DIAG IMG (CPT=93971)    Result Date: 6/16/2024  CONCLUSION: No evidence of right lower extremity DVT.    Dictated by (CST): Oseas Good MD on 6/16/2024 at 10:30 AM     Finalized by (CST): Oseas Good MD on 6/16/2024 at 10:31 AM               SONDRA MART MD  6/16/2024

## 2024-06-16 NOTE — PROGRESS NOTES
[facility]    Progress Note    Ollie Hernández Patient Status:  Inpatient    1931 MRN [id number]   Location [facility] 3W/SW Attending Jesus De Anda MD   Hosp Day # 3 PCP Wili Parmar MD     SUBJECTIVE   Patient is a 79 year old male who was admitted to the hospital for Rectal bleeding:    Overnight: mild nausea, continues to feel gaseous - no recent bm; blood transfusion 1 unit w/o active hematochezia nor melena    Current Medications:  Current Facility-Administered Medications   Medication Dose Route Frequency    simethicone (Mylicon) chewable tab 80 mg  80 mg Oral TID CC and HS    albuterol (Ventolin HFA) 108 (90 Base) MCG/ACT inhaler 2 puff  2 puff Inhalation Q4H PRN    fluticasone propionate (Flonase) 50 MCG/ACT nasal suspension 2 spray  2 spray Nasal Daily    pantoprazole (Protonix) DR tab 40 mg  40 mg Oral QAM AC    cetirizine (ZyrTEC) tab 5 mg  5 mg Oral QOD    tetrahydrozoline (Visine) 0.05 % ophthalmic solution 1 drop  1 drop Both Eyes BID PRN    traMADol (Ultram) tab 50 mg  50 mg Oral Q12H PRN    carvedilol (Coreg) tab 25 mg  25 mg Oral BID    atorvastatin (Lipitor) tab 40 mg  40 mg Oral Nightly    escitalopram (Lexapro) tab 5 mg  5 mg Oral Daily    hydrALAZINE (Apresoline) tab 25 mg  25 mg Oral Q8H STEPHANIE    linaGLIPtin (Tradjenta) tab 5 mg  5 mg Oral Daily    fluticasone furoate-vilanterol (Breo Ellipta) 200-25 MCG/ACT inhaler 1 puff  1 puff Inhalation Daily    methocarbamol (Robaxin) tab 500 mg  500 mg Oral BID PRN    glucose (Dex4) 15 GM/59ML oral liquid 15 g  15 g Oral Q15 Min PRN    Or    glucose (Glutose) 40% oral gel 15 g  15 g Oral Q15 Min PRN    Or    glucose-vitamin C (Dex-4) chewable tab 4 tablet  4 tablet Oral Q15 Min PRN    Or    dextrose 50% injection 50 mL  50 mL Intravenous Q15 Min PRN    Or    glucose (Dex4) 15 GM/59ML oral liquid 30 g  30 g Oral Q15 Min PRN    Or    glucose (Glutose) 40% oral gel 30 g  30 g Oral Q15 Min PRN    Or    glucose-vitamin C (Dex-4)  chewable tab 8 tablet  8 tablet Oral Q15 Min PRN    acetaminophen (Tylenol Extra Strength) tab 500 mg  500 mg Oral Q4H PRN    melatonin tab 3 mg  3 mg Oral Nightly PRN    polyethylene glycol (PEG 3350) (Miralax) 17 g oral packet 17 g  17 g Oral Daily PRN    sennosides (Senokot) tab 17.2 mg  17.2 mg Oral Nightly PRN    bisacodyl (Dulcolax) 10 MG rectal suppository 10 mg  10 mg Rectal Daily PRN    ondansetron (Zofran) 4 MG/2ML injection 4 mg  4 mg Intravenous Q6H PRN    metoclopramide (Reglan) 5 mg/mL injection 5 mg  5 mg Intravenous Q8H PRN    insulin aspart (NovoLOG) 100 Units/mL FlexPen 1-5 Units  1-5 Units Subcutaneous TID CC    clopidogrel (Plavix) tab 75 mg  75 mg Oral Daily    aspirin DR tab 81 mg  81 mg Oral Daily       Medications Prior to Admission   Medication Sig    linaGLIPtin (TRADJENTA) 5 mg Oral Tab Take 1 tablet (5 mg total) by mouth daily.    hydrALAZINE 25 MG Oral Tab Take 1 tablet (25 mg total) by mouth every 8 (eight) hours.    clopidogrel 75 MG Oral Tab Take 1 tablet (75 mg total) by mouth daily.    escitalopram 5 MG Oral Tab Take 1 tablet (5 mg total) by mouth daily.    carvedilol 25 MG Oral Tab Take 1 tablet (25 mg total) by mouth 2 (two) times daily.    atorvastatin 40 MG Oral Tab Take 1 tablet (40 mg total) by mouth nightly.    apixaban 5 MG Oral Tab Take 1 tablet (5 mg total) by mouth 2 (two) times daily.    acetaminophen 500 MG Oral Tab Take 1 tablet (500 mg total) by mouth daily as needed for Pain.    cetirizine 10 MG Oral Tab Take 1 tablet (10 mg total) by mouth every other day.    Cholecalciferol 125 MCG (5000 UT) Oral Tab Take 1 tablet (5,000 Units total) by mouth 2 (two) times daily.    tetrahydrozoline 0.05 % Ophthalmic Solution 1 drop 2 (two) times daily as needed.    felodipine ER 10 MG Oral Tablet 24 Hr Take 1 tablet (10 mg total) by mouth daily.    fluticasone propionate 50 MCG/ACT Nasal Suspension 2 sprays by Nasal route daily.    pantoprazole 40 MG Oral Tab EC Take 1 tablet (40  mg total) by mouth every morning before breakfast.    albuterol (PROAIR HFA) 108 (90 Base) MCG/ACT Inhalation Aero Soln Inhale 2 puffs into the lungs every 4 (four) hours as needed for Wheezing.    Budesonide-Formoterol Fumarate (SYMBICORT) 160-4.5 MCG/ACT Inhalation Aerosol Inhale 2 puffs into the lungs 2 (two) times daily. (Patient taking differently: Inhale 2 puffs into the lungs 2 (two) times daily as needed.)    Darbepoetin Randell 60 MCG/ML Injection Solution Inject into the vein as needed.    polyethylene glycol, PEG 3350, 17 g Oral Powd Pack 17 g Wed, Thurs, Sat    traMADol 50 MG Oral Tab Take 1 tablet (50 mg total) by mouth every 12 (twelve) hours as needed for Pain.    Glucose Blood (CONTOUR NEXT TEST) In Vitro Strip Test 3 times daily    methocarbamol 500 MG Oral Tab Take 1 tablet (500 mg total) by mouth 2 (two) times daily as needed. (Patient not taking: Reported on 6/13/2024)    Insulin Pen Needle (PEN NEEDLES) 32G X 4 MM Does not apply Misc 1 each daily.         Allergies  Allergies   Allergen Reactions    Adhesive Tape OTHER (SEE COMMENTS)     Severe rashes    Dust Mite Extract RASH       Physical Exam:   Blood pressure 132/47, pulse 71, temperature 98.6 °F (37 °C), temperature source Oral, resp. rate 18, height 5' 4\" (1.626 m), weight 156 lb 8 oz (71 kg), SpO2 95%.    General appearance:  alert, appears stated age and cooperative    HEENT: negative findings: lids and lashes normal and conjunctivae and sclerae normal.     Pulmonary: clear to auscultation bilaterally of anterior chest    Cardiovascular: regular rate and rhythm    Abdominal: soft, bowel sounds present; non-distended, non-tender    Extremities: No edema, cyanosis, or clubbing    Skin: warm and dry    Neurologic: Grossly normal    Psychiatric: calm      Results:     Laboratory Data:  Lab Results   Component Value Date    WBC 7.1 06/16/2024    HGB 6.4 (LL) 06/16/2024    HCT 20.2 (L) 06/16/2024    .0 06/16/2024    CREATSERUM 5.07 (H)  06/16/2024    BUN 30 (H) 06/16/2024     06/16/2024    K 3.2 (L) 06/16/2024     06/16/2024    CO2 31.0 06/16/2024     (H) 06/16/2024    CA 9.0 06/16/2024    ALB 3.0 (L) 06/16/2024    ALKPHO 43 (L) 06/16/2024    TP 5.2 (L) 06/16/2024    AST 16 06/16/2024    ALT 12 06/16/2024    T4F 0.9 08/31/2022    TSH 2.060 06/23/2023     (H) 07/25/2023    PSA 2.94 10/20/2021    ESRML 79 (H) 03/16/2016    CRP 0.36 03/16/2016    MG 2.1 11/20/2023    PHOS 3.6 06/15/2024    TROP <0.045 07/25/2019     08/05/2023    B12 631 08/05/2023       Imaging:  No results found.     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)    Atherosclerosis of native arteries of extremities with intermittent claudication, bilateral legs (HCC)    Primary hypertension    Smokers' cough (HCC)    Unstable angina (HCC)    Lower GI bleed    ESRD (end stage renal disease) on dialysis (HCC)    Atrial fibrillation (HCC)    AVM (arteriovenous malformation) of colon    Anemia, blood loss    Rectal bleeding    Anticoagulated    Antiplatelet or antithrombotic long-term use     Rectal bleeding              -no active or high risk stigmata on colonoscopy                         Constipation              -miralax daily              -no straining or sitting on toilet for prolonged periods     CAD s/p recent PCI               -bleeding risk is now greater, but will continue antiplt therapy due to cardiac risk and get colonoscopy today - patient and fam aware higher risk of bleeding    Acute drop in hemoglobin with elevated ferritin and low iron studies support anemia of chronic inflammation    -start IV iron   -will order crp, esr, grace    -epo level pending      Thank you for allowing me to participate in the care of your patient.    Time spent in direct patient contact and decision making as well as counseling/coordination of care:  50 minutes      Note to patient: The 21st Century Cures Act makes medical notes like these available to patients in the interest of transparency. However, this is a medical document intended as peer to peer communication. It is written in medical language and may contain abbreviations or verbiage that are unfamiliar. It may appear blunt or direct. Medical documents are intended to carry relevant information, facts as evident, and the clinical opinion of the practitioner.      Carlyn Storey MD  [facility], GI  6/16/2024  10:02 AM

## 2024-06-16 NOTE — PHYSICAL THERAPY NOTE
PHYSICAL THERAPY EVALUATION - INPATIENT     Room Number: 411/411-A  Evaluation Date: 6/16/2024  Type of Evaluation: Initial   Physician Order: PT Eval and Treat    Presenting Problem: rectal bleeding  Co-Morbidities : CAD, afib, HFpEF, ESRD on HD  Reason for Therapy: Mobility Dysfunction and Discharge Planning    PHYSICAL THERAPY ASSESSMENT   Patient is a 79 year old male admitted 6/13/2024 for BRBPR.  Prior to admission, patient's baseline is indep with ADLs and mobility; pt is limited by chronic pain.  Patient is currently functioning below baseline with transfers, gait, and stair negotiation.  Patient is requiring contact guard assist as a result of the following impairments: pain and decreased muscular endurance.  Physical Therapy will continue to follow for duration of hospitalization.    Patient will benefit from continued skilled PT Services at discharge to promote functional independence in home.  Anticipate patient will return home with home health PT.    PLAN  PT Treatment Plan: Bed mobility;Endurance;Energy conservation;Patient education;Family education;Gait training;Strengthening;Stair training;Transfer training;Balance training  Rehab Potential : Good  Frequency (Obs): 5x/week    PHYSICAL THERAPY MEDICAL/SOCIAL HISTORY   History related to current admission: rectal bleeding, low hgb     Problem List  Principal Problem:    Rectal bleeding  Active Problems:    ESRD (end stage renal disease) on dialysis (HCC)    Anticoagulated    Antiplatelet or antithrombotic long-term use      HOME SITUATION  Home Situation  Type of Home: House  Home Layout: One level (14 stairs to basement, does not need to access)  Stairs to Enter : 5  Railing: Yes  Stairs to Bedroom: 0  Lives With: Spouse  Patient Owned Equipment: Rolling walker;Cane (WIS with grab bars)     Prior Level of Forrest: indep with ADLs and mobility    SUBJECTIVE  I can walk another half mile.    PHYSICAL THERAPY EXAMINATION   OBJECTIVE  Precautions:  Limb alert - right  Fall Risk: Standard fall risk    WEIGHT BEARING RESTRICTION  Weight Bearing Restriction: None                PAIN ASSESSMENT  Rating: 3  Location: \"all over\" jayshree hands, feet, shoulder, neck (chronic pain)  Management Techniques: Activity promotion    COGNITION  Overall Cognitive Status:  WFL - within functional limits    RANGE OF MOTION AND STRENGTH ASSESSMENT  Upper extremity ROM and strength are within functional limits   Lower extremity ROM is within functional limits   Lower extremity strength is limited    BALANCE  Static Sitting: Good  Dynamic Sitting: Fair +  Static Standing: Fair -  Dynamic Standing: Fair -    ADDITIONAL TESTS                                    NEUROLOGICAL FINDINGS                      ACTIVITY TOLERANCE  Pulse: 71  Heart Rate Source: Monitor     BP: 123/57  BP Location: Left arm  BP Method: Automatic  Patient Position: Sitting    O2 WALK  Oxygen Therapy  SPO2% on Room Air at Rest: 96    AM-PAC '6-Clicks' INPATIENT SHORT FORM - BASIC MOBILITY  How much difficulty does the patient currently have...  Patient Difficulty: Turning over in bed (including adjusting bedclothes, sheets and blankets)?: A Little   Patient Difficulty: Sitting down on and standing up from a chair with arms (e.g., wheelchair, bedside commode, etc.): A Little   Patient Difficulty: Moving from lying on back to sitting on the side of the bed?: A Little   How much help from another person does the patient currently need...   Help from Another: Moving to and from a bed to a chair (including a wheelchair)?: A Little   Help from Another: Need to walk in hospital room?: A Little   Help from Another: Climbing 3-5 steps with a railing?: A Little     AM-PAC Score:  Raw Score: 18   Approx Degree of Impairment: 46.58%   Standardized Score (AM-PAC Scale): 43.63   CMS Modifier (G-Code): CK    FUNCTIONAL ABILITY STATUS  Functional Mobility/Gait Assessment  Gait Assistance: Contact guard assist  Distance (ft):  240  Assistive Device: Rolling walker  Pattern:  (dec pushpa, dec step length)  Stairs:  (pt declined today)  Rolling:  NT  Supine to Sit:  NT  Sit to Supine:  NT  Sit to Stand: contact guard assist    Exercise/Education Provided:  Gait training  Strengthening  Lower therapeutic exercise:  Alternating marching  Ankle pumps  Knee extension  LAQ  Transfer training  PT eval    Pt ok to be seen per ROSALINO Lee. Pt educ in role of PT and PT POC. Pt willing to participate in PT. Pt demo'd safety amb with RW, no LOB, no SOB. Pt able to increase distance when compared with what he walked yesterday with nursing staff. Pt declined participation in stairs at this time, stating he felt a little too weak yet. Pt not using RW at home PTA. Pt educ in seated therex, endorsed understanding, and has performed the exercises before.    The patient's Approx Degree of Impairment: 46.58% has been calculated based on documentation in the [facility] '6 clicks' Inpatient Basic Mobility Short Form.  Research supports that patients with this level of impairment may benefit from home with HHPT.  Final disposition will be made by interdisciplinary medical team.    Patient End of Session: Up in chair;Needs met;Call light within reach;RN aware of session/findings;All patient questions and concerns addressed;Family present    CURRENT GOALS  Goals to be met by: 6/23/24  Patient Goal Patient's self-stated goal is: return home   Goal #1 Patient is able to demonstrate supine - sit EOB @ level: modified independent     Goal #1   Current Status    Goal #2 Patient is able to demonstrate transfers Sit to/from Stand at assistance level: modified independent with walker - rolling     Goal #2  Current Status    Goal #3 Patient is able to ambulate 300 feet with assist device: walker - rolling at assistance level: supervision   Goal #3   Current Status    Goal #4 Patient will negotiate 5 stairs with rail and supervision   Goal #4   Current Status    Goal #5 Patient  to demonstrate independence with home activity/exercise instructions provided to patient in preparation for discharge.   Goal #5   Current Status    Goal #6    Goal #6  Current Status      Patient Evaluation Complexity Level:  History Low - no personal factors and/or co-morbidities   Examination of body systems Low -  addressing 1-2 elements   Clinical Presentation Low- Stable   Clinical Decision Making  Low Complexity     Gait Training: 15 minutes

## 2024-06-17 LAB
ALBUMIN SERPL-MCNC: 3.1 G/DL (ref 3.2–4.8)
ANION GAP SERPL CALC-SCNC: 9 MMOL/L (ref 0–18)
BASOPHILS # BLD AUTO: 0.03 X10(3) UL (ref 0–0.2)
BASOPHILS # BLD AUTO: 0.05 X10(3) UL (ref 0–0.2)
BASOPHILS NFR BLD AUTO: 0.4 %
BASOPHILS NFR BLD AUTO: 0.6 %
BLOOD TYPE BARCODE: 6200
BUN BLD-MCNC: 39 MG/DL (ref 9–23)
BUN/CREAT SERPL: 6.4 (ref 10–20)
CALCIUM BLD-MCNC: 8.5 MG/DL (ref 8.7–10.4)
CHLORIDE SERPL-SCNC: 96 MMOL/L (ref 98–112)
CO2 SERPL-SCNC: 30 MMOL/L (ref 21–32)
CREAT BLD-MCNC: 6.06 MG/DL
DEPRECATED RDW RBC AUTO: 61.5 FL (ref 35.1–46.3)
DEPRECATED RDW RBC AUTO: 62.5 FL (ref 35.1–46.3)
DSDNA IGG SERPL IA-ACNC: 1.3 IU/ML
EGFRCR SERPLBLD CKD-EPI 2021: 9 ML/MIN/1.73M2 (ref 60–?)
ENA AB SER QL IA: 0.3 UG/L
ENA AB SER QL IA: NEGATIVE
EOSINOPHIL # BLD AUTO: 0.12 X10(3) UL (ref 0–0.7)
EOSINOPHIL # BLD AUTO: 0.14 X10(3) UL (ref 0–0.7)
EOSINOPHIL NFR BLD AUTO: 1.4 %
EOSINOPHIL NFR BLD AUTO: 1.8 %
ERYTHROCYTE [DISTWIDTH] IN BLOOD BY AUTOMATED COUNT: 18.7 % (ref 11–15)
ERYTHROCYTE [DISTWIDTH] IN BLOOD BY AUTOMATED COUNT: 18.8 % (ref 11–15)
GLUCOSE BLD-MCNC: 138 MG/DL (ref 70–99)
GLUCOSE BLDC GLUCOMTR-MCNC: 117 MG/DL (ref 70–99)
GLUCOSE BLDC GLUCOMTR-MCNC: 132 MG/DL (ref 70–99)
GLUCOSE BLDC GLUCOMTR-MCNC: 133 MG/DL (ref 70–99)
GLUCOSE BLDC GLUCOMTR-MCNC: 138 MG/DL (ref 70–99)
GLUCOSE BLDC GLUCOMTR-MCNC: 185 MG/DL (ref 70–99)
HCT VFR BLD AUTO: 22.1 %
HCT VFR BLD AUTO: 28.1 %
HGB BLD-MCNC: 7.1 G/DL
HGB BLD-MCNC: 8.9 G/DL
IMM GRANULOCYTES # BLD AUTO: 0.03 X10(3) UL (ref 0–1)
IMM GRANULOCYTES # BLD AUTO: 0.03 X10(3) UL (ref 0–1)
IMM GRANULOCYTES NFR BLD: 0.4 %
IMM GRANULOCYTES NFR BLD: 0.4 %
LYMPHOCYTES # BLD AUTO: 0.96 X10(3) UL (ref 1–4)
LYMPHOCYTES # BLD AUTO: 1.13 X10(3) UL (ref 1–4)
LYMPHOCYTES NFR BLD AUTO: 11.2 %
LYMPHOCYTES NFR BLD AUTO: 14.5 %
MCH RBC QN AUTO: 29.6 PG (ref 26–34)
MCH RBC QN AUTO: 29.6 PG (ref 26–34)
MCHC RBC AUTO-ENTMCNC: 31.7 G/DL (ref 31–37)
MCHC RBC AUTO-ENTMCNC: 32.1 G/DL (ref 31–37)
MCV RBC AUTO: 92.1 FL
MCV RBC AUTO: 93.4 FL
MONOCYTES # BLD AUTO: 0.42 X10(3) UL (ref 0.1–1)
MONOCYTES # BLD AUTO: 0.49 X10(3) UL (ref 0.1–1)
MONOCYTES NFR BLD AUTO: 4.9 %
MONOCYTES NFR BLD AUTO: 6.3 %
NEUTROPHILS # BLD AUTO: 5.95 X10 (3) UL (ref 1.5–7.7)
NEUTROPHILS # BLD AUTO: 5.95 X10(3) UL (ref 1.5–7.7)
NEUTROPHILS # BLD AUTO: 6.99 X10 (3) UL (ref 1.5–7.7)
NEUTROPHILS # BLD AUTO: 6.99 X10(3) UL (ref 1.5–7.7)
NEUTROPHILS NFR BLD AUTO: 76.6 %
NEUTROPHILS NFR BLD AUTO: 81.5 %
OSMOLALITY SERPL CALC.SUM OF ELEC: 292 MOSM/KG (ref 275–295)
PHOSPHATE SERPL-MCNC: 5 MG/DL (ref 2.4–5.1)
PLATELET # BLD AUTO: 167 10(3)UL (ref 150–450)
PLATELET # BLD AUTO: 197 10(3)UL (ref 150–450)
POTASSIUM SERPL-SCNC: 3.3 MMOL/L (ref 3.5–5.1)
RBC # BLD AUTO: 2.4 X10(6)UL
RBC # BLD AUTO: 3.01 X10(6)UL
SODIUM SERPL-SCNC: 135 MMOL/L (ref 136–145)
UNIT VOLUME: 350 ML
WBC # BLD AUTO: 7.8 X10(3) UL (ref 4–11)
WBC # BLD AUTO: 8.6 X10(3) UL (ref 4–11)

## 2024-06-17 PROCEDURE — 99233 SBSQ HOSP IP/OBS HIGH 50: CPT | Performed by: INTERNAL MEDICINE

## 2024-06-17 NOTE — PHYSICAL THERAPY NOTE
PHYSICAL THERAPY TREATMENT NOTE - INPATIENT     Room Number: 411/411-A       Presenting Problem: rectal bleeding  Co-Morbidities : CAD, afib, HFpEF, ESRD on HD    Problem List  Principal Problem:    Rectal bleeding  Active Problems:    ESRD (end stage renal disease) on dialysis (HCC)    Anticoagulated    Antiplatelet or antithrombotic long-term use      PHYSICAL THERAPY ASSESSMENT   Patient demonstrates good  progress this session, goals  remain in progress.    Patient continues to function below baseline with bed mobility, transfers, and gait.  Contributing factors to remaining limitations include decreased functional strength, decreased endurance/aerobic capacity, and pain.  Next session anticipate patient to progress bed mobility, transfers, and gait.  Physical Therapy will continue to follow patient for duration of hospitalization.    Patient continues to benefit from continued skilled PT services: at discharge to promote functional independence in home.  Anticipate patient will return home with home health PT.    PLAN  PT Treatment Plan: Body mechanics;Bed mobility;Endurance;Gait training  Frequency (Obs): 5x/week    SUBJECTIVE  Pt reports being ready for PT RX    OBJECTIVE  Precautions: Limb alert - right    WEIGHT BEARING RESTRICTION                PAIN ASSESSMENT   Ratin  Location: \"all over\" jayshree hands, feet, shoulder, neck (chronic pain)  Management Techniques: Activity promotion    BALANCE  Static Sitting: Good  Dynamic Sitting: Fair +  Static Standing: Poor -  Dynamic Standing: Fair -    ACTIVITY TOLERANCE                          O2 WALK       AM-PAC '6-Clicks' INPATIENT SHORT FORM - BASIC MOBILITY  How much difficulty does the patient currently have...  Patient Difficulty: Turning over in bed (including adjusting bedclothes, sheets and blankets)?: A Little   Patient Difficulty: Sitting down on and standing up from a chair with arms (e.g., wheelchair, bedside commode, etc.): A Little   Patient  Difficulty: Moving from lying on back to sitting on the side of the bed?: A Little   How much help from another person does the patient currently need...   Help from Another: Moving to and from a bed to a chair (including a wheelchair)?: A Little   Help from Another: Need to walk in hospital room?: A Little   Help from Another: Climbing 3-5 steps with a railing?: A Little     AM-PAC Score:  Raw Score: 18   Approx Degree of Impairment: 46.58%   Standardized Score (AM-PAC Scale): 43.63   CMS Modifier (G-Code): CK    FUNCTIONAL ABILITY STATUS  Functional Mobility/Gait Assessment  Gait Assistance: Contact guard assist  Distance (ft): 2 x 100  Assistive Device: Rolling walker  Pattern: Shuffle  Stairs:  (pt declined today)  Rolling: minimal assist  Supine to Sit: minimal assist  Sit to Supine: minimal assist  Sit to Stand: minimal assist    Additional information: Pt seen daily.Min a for bed mobility and transfer.EOB sitting balance activity with emphasis on core stabilization.Family present;family education;all questions and concerns addressed.Pt amb 2 x 100 ft with RW and CGA.There ex.    The patient's Approx Degree of Impairment: 46.58% has been calculated based on documentation in the [facility] '6 clicks' Inpatient Daily Activity Short Form.  Research supports that patients with this level of impairment may benefit from Home with Home Health PT.  Final disposition will be made by interdisciplinary medical team.    THERAPEUTIC EXERCISES  Lower Extremity Alternating marching  Ankle pumps  Glut sets     Position Supine       Patient End of Session: Up in chair;Call light within reach;RN aware of session/findings;All patient questions and concerns addressed    CURRENT GOALS     Patient Goal Patient's self-stated goal is: return home   Goal #1 Patient is able to demonstrate supine - sit EOB @ level: modified independent     Goal #1   Current Status Min a   Goal #2 Patient is able to demonstrate transfers Sit to/from Stand at  assistance level: modified independent with walker - rolling     Goal #2  Current Status Min a   Goal #3 Patient is able to ambulate 300 feet with assist device: walker - rolling at assistance level: supervision   Goal #3   Current Status Pt amb 2 x 100 ft with RW and CGA   Goal #4 Patient will negotiate 5 stairs with rail and supervision   Goal #4   Current Status NT   Goal #5 Patient to demonstrate independence with home activity/exercise instructions provided to patient in preparation for discharge.   Goal #5   Current Status In prsent   Goal #6    Goal #6  Current Status      Gait Training: 15 minutes  Therapeutic Activity: 15 minutes  Neuromuscular Re-education:  minutes  Therapeutic Exercise:  minutes  Canalith Repositioning:  minutes  Manual Therapy:  minutes  Can add/delete any of these

## 2024-06-17 NOTE — PROGRESS NOTES
[facility]  part of [facility]    Progress Note    Ollie Hernández Patient Status:  Inpatient    1945 MRN [id number]   Location [facility] 4W/SW/SE Attending Wili Parmar MD   Hosp Day # 4 PCP Wili Parmar MD       SUBJECTIVE:  Feels a little bit bloated since the colonoscopy.  However feeling better since admission.    OBJECTIVE:  /53 (BP Location: Left arm)   Pulse 76   Temp 97.6 °F (36.4 °C) (Oral)   Resp 20   Ht 5' 4\" (1.626 m)   Wt 156 lb 8 oz (71 kg)   SpO2 98%   BMI 26.86 kg/m²     Intake/Output:  I/O last 3 completed shifts:  In: 570 [P.O.:240; Blood:330]  Out: -     Wt Readings from Last 3 Encounters:   24 156 lb 8 oz (71 kg)   24 150 lb 4.8 oz (68.2 kg)   24 163 lb 9.6 oz (74.2 kg)       Exam  Gen: No acute distress, alert and oriented x3; examined while starting dialysis  Pulm: Lungs CTAB, no rhonchi or wheezing  CV: Heart IRR  Abd: Abdomen soft, mildly distended  MSK: +RLE edema; no LLE edema  Skin: no rashes or lesions  Neuro: A&O x 3    Labs:   Recent Labs   Lab 06/15/24  1657 24  0436 24  1055 24  0454   RBC 2.40* 2.14*  --  2.40*   HGB 7.3* 6.4* 7.4* 7.1*   HCT 22.0* 20.2*  --  22.1*   MCV 91.7 94.4  --  92.1   MCH 30.4 29.9  --  29.6   MCHC 33.2 31.7  --  32.1   RDW 19.0* 18.8*  --  18.7*   NEPRELIM 5.65 5.07  --  5.95   WBC 8.3 7.1  --  7.8   .0 175.0  --  167.0         Recent Labs   Lab 24  0849 24  0340 06/15/24  0507 24  0436 24  0400   *   < > 137*  137* 106* 138*   BUN 43*   < > 28*  28* 30* 39*   CREATSERUM 4.74*   < > 3.99*  3.99* 5.07* 6.06*   EGFRCR 12*   < > 15*  15* 11* 9*   CA 9.5   < > 9.0  9.0 9.0 8.5*   ALB 3.8   < > 3.4  3.4 3.0* 3.1*      < > 142  142 139 135*   K 4.0   < > 3.7  3.7 3.2* 3.3*      < > 103  103 101 96*   CO2 32.0   < > 33.0*  33.0* 31.0 30.0   ALKPHO 57  --  47 43*  --    AST 25  --  21 16  --    ALT 27  --  16 12   --    BILT 0.3  --  0.6 0.5  --    TP 6.7  --  5.8 5.2*  --     < > = values in this interval not displayed.         Imaging:  US VENOUS DOPPLER LEG RIGHT - DIAG IMG (CPT=93971)    Result Date: 6/16/2024  CONCLUSION: No evidence of right lower extremity DVT.    Dictated by (CST): Oseas Good MD on 6/16/2024 at 10:30 AM     Finalized by (CST): Oseas Good MD on 6/16/2024 at 10:31 AM                 Assessment and Plan:       Rectal bleeding   Acute on chronic anemia  -pt with hx of constipation/hemorrhoids/rectal bleeding in the past (saw GI Dr. Gautam for this in 10/2020)  -last colonoscopy 1/25/21 (Dr. Gautam) -- internal hemorrhoids, colon polyps x 2 (one tubular adenoma)  Recent egd/colonoscopy for GI bleeding 6/1/2024 demonstrating hepatic flexure AVM, which was cauterized  -eliquis on hold  -s/p 1prbc 6/14/24; Hgb 6.7>7.9>7.3>6.4; receiving 1u PRBC 6/16 -> 7.4 -> 7.1  -appreciate GI consult, started on IV Venofer x 1 dose 6/16  -colonoscopy 6/15/2024: no active bleeding; ascending colon ulcer with single clip-clean based with small red spot, exacerbated by irrigation and second clip placed; small internal hemorrhoids-no bleeding, small 1-2mm splenic flexure polyp (not removed d/t high risk bleeding)  - Plan to repeat 1 PM CBC after dialysis completed    RLE edema  Check venous doppler  - No evidence of RLE DVT ( off eliquis)    Abdominal gaseous distension  Gasx TID and HS     CAD  -LHC 5/21/2024 by Dr. Luis Orozco: 70% lesion of apical LAD, 80% lesion of LCx  -S/p PCI of LCx (medical management of the LAD lesion)  -s/p aspirin, plavix and eliquis x 1 week, now just plavix and eliquis  -cardiology consulted--resume aspirin and plavix; plan to hold eliquis until completing 1 month DAPT     Pharyngeal dysphagia  --right side hypomobile oropharyngeal dysphagia  - swallow study 4/2024: intermittent shallow and deep laryngeal penetration without aspiration, small zenker's diverticulum, bulky endplate osteophytes  contributing to dysphagia  - to see Dr. Del Angel (ENT at [facility]) for further imaging     Paroxysmal A-fib  --dx'ed 9/2023  --Zio monitor 10/2023 -- NSVT (normal EF 65%); on carvedilol  - followed by EP Dr. Phelan  --Eliquis on hold     Cervical radiculopathy  Chronic back pain with neuropathy  -CT scan of the cervical neck 7/2019 ; history of cervical decompressive laminectomy with multilevel degenerative disc disease  - s/p physical therapy in the past  --MRI brain and MRI cervical spine done 12/29/22 (MRI brain w/mod chronic microvascular ischemic changes bilat cerebral hemispheres, basal ganglia and thalami; MRI cervical spine w/multilevel spinal stenosis)  --saw NS PA Dr. Araujo in 1/2023; had subsequent MRI thoracic/lumbar spine.  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  Pt was referred for PT in 2/2023.      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 Sevilla  --HgbA1c 5.5 in 9/2023  - Gabapentin for neuropathy  --at home: on Tradjenta 5mg daily (no longer on insulin)  -- SSI in hospital  ---160     ESRD on hemodialysis  - 2/2 prolonged history of diabetes and hypertension  - Follows with Dr. Martinez  - Planning for HD today     Hypertension  - carvedilol 25 mg BID, hydralazine 25mg q8h, felodipine 10mg/day (on amlodipine in hosp due to formulary)     Hyperlipidemia  - Continue with On atorvastatin 40 mg daily, aspirin     BPH  -no longer takes trospium or tamsulosin     Pulmonary hypertension  -Comanagement of KRAIG and chronic diastolic heart failure  - stable     KRAIG on CPAP  - sees pulm Dr. Landeros     Cataracts  -Seems to be stable, follows with Dr. Chase of ophthalmology     Gout  Flareup in 6/2022. NO recurrence  -Seems to be stable     Anemia of ESRD  -Baseline hemoglobin seems to be around  10-11  -managed by nephrology; on aranesp  -Hgb decreased as above  - Started on IV Venofer as above     Sensorineural hearing loss  Mild-moderate per audiology testing/14/2022.  He may be a hearing aid candidate in the future     Unsteady Gait  Ongoing fatigue due to multiple comorbidities as above  - uses a walker, 2 wheeled-2 constipation.  Does not want a rollator walker at this time  - He is a fall risk with his unsteady gait, fatigue.     Anxiety  Lexapro 5 mg once a day.          VTE PPx: SCDs, holding eliquis    Dispo: home with HH when approrpiate    Wili Parmar MD, 06/17/24, 9:10 AM

## 2024-06-17 NOTE — PROGRESS NOTES
Cardiology Progress Note  [facility]    Ollie Hernández Patient Status:  Emergency    1945 MRN [id number]   Location [facility] EMERGENCY DEPARTMENT Attending Antonio Nunes MD   Hosp Day # 4 PCP Wili Parmar MD     Reason for Consultation:  Bleeding    History of Present Illness:  Ollie Hernández is a a(n) 79 year old male with coronary artery disease and PCI to circumflex (2024), paroxysmal atrial fibrillation on Eliquis, chronic HFpEF, ESRD on hemodialysis who presents with recurrent rectal bleeding.    His PCI was on 2024.  He was discharged on 1 week of triple therapy with aspirin 81 mg, Plavix, Eliquis.  Patient then stopped aspirin as planned.  He presented to [facility] on 2024 with rectal bleeding for 3 days while on dual antithrombotic therapy with Plavix and Eliquis.  Anticoagulation was held while patient underwent endoscopy, and he had APC of bleeding lesion and removal of a sessile polyp.    Placed back on Plavix plus Eliquis (restarted 24) and discharged.  He felt well without any bleeding until this morning at 7 AM, when he had a large bloody bowel movement with bright red blood.    Subjective:  Patient had bloody stool during dialysis with drop in Hgb to 6.7. He received 1U pRBCs.    Assessment/Plan:    ABLA Hgb 11.5 (24) -> 9.8   - Reviewed records: Hgb appears somewhat variable but baseline appears to be 10-11.  - Underwent colonoscopy s/p Clip of bleeding lesion and removal of sessile polyp.  CAD - PCI LCX with [place] ABIMBOLA x 1 (24)  - Restarted Eliquis + plavix on 24  - Eliquis stopped on 24 (last dose by patient 24 pm).  PAF on Eliquis  NSVT on Zio patch  Chronic HFpEF - compensated  ESRD on HD MWF  DM2  HTN  KRAIG on CPAP  LVEF 60% (echo )    Plan  Risk of life threatening stent thrombosis outweighs risk of life threatening hemorrhage at present.   Restarted DAPT given above given risk of instent thrombosis in  this time period. Bleeding may be residual effects of Eliquis.  Repeat Hgb and transfuse for < 8. 1 uPRBC  6/16  Can do DAPT x 1 month as he received a [place] ABIMBOLA (FDA approved for shorter course of DAPT).  Can transition to ASA + Eliquis after 1 month of DAPT for a total duration of 6 months.  Hypertension continue home meds.    6/16/2024  No angina no sign of heart failure  DAPT has been resumed  Tolerated GI procedure  Transfusion today  Continue to hold Eliquis    6/17/24  week tired  3L fluid off today   No CP no SOB   Denies any bleeding     History:  Past Medical History:    Anemia    Asthma (HCC)    Back problem    BPH (benign prostatic hyperplasia)    Calculus of kidney    Cataract    Diabetes (HCC)    ESRD (end stage renal disease) on dialysis (HCC)    Essential hypertension    High blood pressure    High cholesterol    History of blood transfusion    Hyperlipidemia    Neuropathy    hands and feet    KRAIG on CPAP    Renal disorder    Sleep apnea    Visual impairment    glasses    Vocal cord paralysis, unilateral partial     Past Surgical History:   Procedure Laterality Date    Appendectomy      1981    Back surgery      Neck/back - 1998    Capsule endoscopy - internal referral      Cataract      12/2021 and 1/2022    Colonoscopy      Colonoscopy N/A 1/25/2021    Procedure: COLONOSCOPY;  Surgeon: Michael Gautam MD;  Location: [facility] ENDO    Colonoscopy N/A 6/3/2024    Procedure: COLONOSCOPY;  Surgeon: Gabriel Saldana MD;  Location: [facility] ENDOSCOPY    Colonoscopy N/A 6/15/2024    Procedure: COLONOSCOPY;  Surgeon: Carlyn Storey MD;  Location: [facility] ENDOSCOPY    Hand/finger surgery unlisted      Accidental trauma    Upper gi endoscopy,diagnosis       Family History   Problem Relation Age of Onset    Cancer Father         Kidney    Breast Cancer Mother     Diabetes Mother     Diabetes Maternal Grandmother     Diabetes Maternal Grandfather     Heart Disorder Other         Uncle      reports that he quit  smoking about 43 years ago. His smoking use included cigarettes. He started smoking about 60 years ago. He has a 17 pack-year smoking history. He has never used smokeless tobacco. He reports that he does not drink alcohol and does not use drugs.    Allergies:  Allergies   Allergen Reactions    Adhesive Tape OTHER (SEE COMMENTS)     Severe rashes    Dust Mite Extract RASH       Medications:  Current Facility-Administered Medications on File Prior to Encounter   Medication Dose Route Frequency Provider Last Rate Last Admin    [COMPLETED] iron sucrose (Venofer) 300 mg in sodium chloride 0.9% 250 mL IVPB  300 mg Intravenous Daily José Callahan .7 mL/hr at 24 1133 300 mg at 24 1133    [COMPLETED] polyethylene glycol-electrolyte (Golytely) 236 g oral solution 2,000 mL  2,000 mL Oral Once Emilio Talley MD   2,000 mL at 24 0456    [COMPLETED] polyethylene glycol-electrolyte (Golytely) 236 g oral solution 2,000 mL  2,000 mL Oral Once Emilio Talley MD   2,000 mL at 24 1800    [] sodium chloride 0.9 % IV bolus 100 mL  100 mL Intravenous Q30 Min PRN Walker Klein MD        And    [] albumin human (Albumin) 25% injection 25 g  25 g Intravenous PRN Dialysis Walker Klein MD        [COMPLETED] acetaminophen (Tylenol Extra Strength) tab 1,000 mg  1,000 mg Oral Once Devaughn Montiel, DO   1,000 mg at 24 0345    [COMPLETED] sodium chloride 0.9% infusion   Intravenous On Call Luis Orozco MD   Stopped at 24 0000    [COMPLETED] lidocaine PF (Xylocaine-MPF) 2 % injection             [COMPLETED] heparin in sodium chloride 0.9% (Porcine) 2 Units/mL flush bag premix             [COMPLETED] heparin in sodium chloride 0.9% (Porcine) 2 Units/mL flush bag premix             [COMPLETED] fentaNYL (Sublimaze) 50 mcg/mL injection             [COMPLETED] midazolam (Versed) 2 MG/2ML injection             [COMPLETED] heparin (Porcine) 1000 UNIT/ML injection              [COMPLETED] Nitroglycerin in D5W 200-5 MCG/ML-% injection             [COMPLETED] midazolam (Versed) 2 MG/2ML injection             [COMPLETED] iohexol (Omnipaque) 300 MG/ML injection 350 mL  350 mL Intravenous ONCE PRN Luis Orozco MD   230 mL at 24 1601    [] sodium chloride 0.9% infusion   Intravenous Continuous Luis Orozco MD   Stopped at 24 0000     Current Outpatient Medications on File Prior to Encounter   Medication Sig Dispense Refill    linaGLIPtin (TRADJENTA) 5 mg Oral Tab Take 1 tablet (5 mg total) by mouth daily. 90 tablet 1    hydrALAZINE 25 MG Oral Tab Take 1 tablet (25 mg total) by mouth every 8 (eight) hours. 90 tablet 1    clopidogrel 75 MG Oral Tab Take 1 tablet (75 mg total) by mouth daily. 90 tablet 1    escitalopram 5 MG Oral Tab Take 1 tablet (5 mg total) by mouth daily. 90 tablet 3    carvedilol 25 MG Oral Tab Take 1 tablet (25 mg total) by mouth 2 (two) times daily.      atorvastatin 40 MG Oral Tab Take 1 tablet (40 mg total) by mouth nightly. 90 tablet 3    apixaban 5 MG Oral Tab Take 1 tablet (5 mg total) by mouth 2 (two) times daily.      acetaminophen 500 MG Oral Tab Take 1 tablet (500 mg total) by mouth daily as needed for Pain.      cetirizine 10 MG Oral Tab Take 1 tablet (10 mg total) by mouth every other day.      Cholecalciferol 125 MCG (5000 UT) Oral Tab Take 1 tablet (5,000 Units total) by mouth 2 (two) times daily.      tetrahydrozoline 0.05 % Ophthalmic Solution 1 drop 2 (two) times daily as needed.      felodipine ER 10 MG Oral Tablet 24 Hr Take 1 tablet (10 mg total) by mouth daily. 90 tablet 3    fluticasone propionate 50 MCG/ACT Nasal Suspension 2 sprays by Nasal route daily. 48 g 3    pantoprazole 40 MG Oral Tab EC Take 1 tablet (40 mg total) by mouth every morning before breakfast. 90 tablet 3    albuterol (PROAIR HFA) 108 (90 Base) MCG/ACT Inhalation Aero Soln Inhale 2 puffs into the lungs every 4 (four) hours as needed for Wheezing. 3 each 3     Budesonide-Formoterol Fumarate (SYMBICORT) 160-4.5 MCG/ACT Inhalation Aerosol Inhale 2 puffs into the lungs 2 (two) times daily. (Patient taking differently: Inhale 2 puffs into the lungs 2 (two) times daily as needed.) 3 each 3    Darbepoetin Randell 60 MCG/ML Injection Solution Inject into the vein as needed.      polyethylene glycol, PEG 3350, 17 g Oral Powd Pack 17 g Wed, Thurs, Sat      traMADol 50 MG Oral Tab Take 1 tablet (50 mg total) by mouth every 12 (twelve) hours as needed for Pain.      Glucose Blood (CONTOUR NEXT TEST) In Vitro Strip Test 3 times daily 300 each 1    methocarbamol 500 MG Oral Tab Take 1 tablet (500 mg total) by mouth 2 (two) times daily as needed. (Patient not taking: Reported on 6/13/2024) 60 tablet 1    Insulin Pen Needle (PEN NEEDLES) 32G X 4 MM Does not apply Misc 1 each daily. 100 each 0       Review of Systems:  Constitutional: denies fevers, chills, night sweats  HEENT: denies headache, vision changes, trouble or pain with swallowing  Cardiac: denies chest pain, palpitations, edema  Pulm: denies dyspnea, cough, wheeze  GI: denies n/v, abd pain, diarrhea or constipation  : denies hematuria, dysuria, incontinence  MSK: denies muscle or joint pains  Neuro: denies numbness, weakness, paresthesias  Psych: denies anxiety, depression  Integument: denies skin rashes or lesions  Heme: denies easy bruising or bleeding  Endo: denies heat/cold intolerance, skin or nail changes      Physical Exam:  Blood pressure 123/54, pulse 74, temperature 98 °F (36.7 °C), temperature source Oral, resp. rate 18, height 162.6 cm (5' 4\"), weight 156 lb 8 oz (71 kg), SpO2 96%.  Wt Readings from Last 3 Encounters:   06/13/24 156 lb 8 oz (71 kg)   06/05/24 150 lb 4.8 oz (68.2 kg)   05/28/24 163 lb 9.6 oz (74.2 kg)       General: awake, alert, oriented x 3, no acute distress  HEENT: at/nc, perrl, eomi  Neck: No JVD, carotids 2+ no bruits.  Cardiac: Regular rate and rhythm, S1, S2 normal, no murmur, rub or  gallop.  Lungs: Clear without wheezes, rales, rhonchi or dullness.  Normal excursions and effort.  Abdomen: Soft, non-tender, non-distended, normal bowel sounds   Extremities: Without clubbing, cyanosis or edema.  Peripheral pulses are 2+.  Neurologic: Alert and oriented, normal affect.  Psych: normal mood and affect  Skin: Warm and dry.       Laboratories and Data:  Diagnostics:      Labs:   CBC:    Lab Results   Component Value Date    WBC 7.8 06/17/2024    WBC 7.1 06/16/2024    WBC 8.3 06/15/2024     Lab Results   Component Value Date    HGB 7.1 (L) 06/17/2024    HGB 7.4 (L) 06/16/2024    HGB 6.4 (LL) 06/16/2024      Lab Results   Component Value Date    .0 06/17/2024    .0 06/16/2024    .0 06/15/2024     BMP:   No results found for: \"GLUCOSE\"  Lab Results   Component Value Date    K 3.3 (L) 06/17/2024    K 3.2 (L) 06/16/2024    K 3.7 06/15/2024    K 3.7 06/15/2024     Lab Results   Component Value Date    BUN 39 (H) 06/17/2024    BUN 30 (H) 06/16/2024    BUN 28 (H) 06/15/2024    BUN 28 (H) 06/15/2024     Lab Results   Component Value Date    CREATSERUM 6.06 (H) 06/17/2024    CREATSERUM 5.07 (H) 06/16/2024    CREATSERUM 3.99 (H) 06/15/2024    CREATSERUM 3.99 (H) 06/15/2024     Cholesterol:     Lab Results   Component Value Date    CHOLEST 170 06/23/2023    CHOLEST 157 11/23/2022    CHOLEST 168 08/31/2022     Lab Results   Component Value Date    HDL 43 06/23/2023    HDL 36 (L) 11/23/2022    HDL 43 08/31/2022     Lab Results   Component Value Date    TRIG 132 06/23/2023    TRIG 143 11/23/2022    TRIG 109 08/31/2022     Lab Results   Component Value Date     (H) 06/23/2023    LDL 96 11/23/2022     (H) 08/31/2022     Lab Results   Component Value Date    AST 16 06/16/2024    AST 21 06/15/2024    AST 25 06/13/2024     Lab Results   Component Value Date    ALT 12 06/16/2024    ALT 16 06/15/2024    ALT 27 06/13/2024

## 2024-06-17 NOTE — PROGRESS NOTES
[facility]    Progress Note    Ollie Hernández Patient Status:  Inpatient    1931 MRN [id number]   Location [facility] 3W/SW Attending Jesus De Anda MD   Hosp Day # 4 PCP Wili Parmar MD     SUBJECTIVE   Patient is a 79 year old male who was admitted to the hospital for Rectal bleeding:    Overnight: HD today.  Hgb 7 this AM. No overt GIB per pt.  No pain, just feels some gaseous bloating    Current Medications:  Current Facility-Administered Medications   Medication Dose Route Frequency    sodium chloride 0.9 % IV bolus 100 mL  100 mL Intravenous Q30 Min PRN    And    albumin human (Albumin) 25% injection 25 g  25 g Intravenous PRN Dialysis    simethicone (Mylicon) chewable tab 80 mg  80 mg Oral TID CC and HS    albuterol (Ventolin HFA) 108 (90 Base) MCG/ACT inhaler 2 puff  2 puff Inhalation Q4H PRN    fluticasone propionate (Flonase) 50 MCG/ACT nasal suspension 2 spray  2 spray Nasal Daily    pantoprazole (Protonix) DR tab 40 mg  40 mg Oral QAM AC    cetirizine (ZyrTEC) tab 5 mg  5 mg Oral QOD    tetrahydrozoline (Visine) 0.05 % ophthalmic solution 1 drop  1 drop Both Eyes BID PRN    traMADol (Ultram) tab 50 mg  50 mg Oral Q12H PRN    carvedilol (Coreg) tab 25 mg  25 mg Oral BID    atorvastatin (Lipitor) tab 40 mg  40 mg Oral Nightly    escitalopram (Lexapro) tab 5 mg  5 mg Oral Daily    hydrALAZINE (Apresoline) tab 25 mg  25 mg Oral Q8H STEPHANIE    linaGLIPtin (Tradjenta) tab 5 mg  5 mg Oral Daily    fluticasone furoate-vilanterol (Breo Ellipta) 200-25 MCG/ACT inhaler 1 puff  1 puff Inhalation Daily    methocarbamol (Robaxin) tab 500 mg  500 mg Oral BID PRN    glucose (Dex4) 15 GM/59ML oral liquid 15 g  15 g Oral Q15 Min PRN    Or    glucose (Glutose) 40% oral gel 15 g  15 g Oral Q15 Min PRN    Or    glucose-vitamin C (Dex-4) chewable tab 4 tablet  4 tablet Oral Q15 Min PRN    Or    dextrose 50% injection 50 mL  50 mL Intravenous Q15 Min PRN    Or    glucose (Dex4) 15 GM/59ML oral liquid  30 g  30 g Oral Q15 Min PRN    Or    glucose (Glutose) 40% oral gel 30 g  30 g Oral Q15 Min PRN    Or    glucose-vitamin C (Dex-4) chewable tab 8 tablet  8 tablet Oral Q15 Min PRN    acetaminophen (Tylenol Extra Strength) tab 500 mg  500 mg Oral Q4H PRN    melatonin tab 3 mg  3 mg Oral Nightly PRN    polyethylene glycol (PEG 3350) (Miralax) 17 g oral packet 17 g  17 g Oral Daily PRN    sennosides (Senokot) tab 17.2 mg  17.2 mg Oral Nightly PRN    bisacodyl (Dulcolax) 10 MG rectal suppository 10 mg  10 mg Rectal Daily PRN    ondansetron (Zofran) 4 MG/2ML injection 4 mg  4 mg Intravenous Q6H PRN    metoclopramide (Reglan) 5 mg/mL injection 5 mg  5 mg Intravenous Q8H PRN    insulin aspart (NovoLOG) 100 Units/mL FlexPen 1-5 Units  1-5 Units Subcutaneous TID CC    clopidogrel (Plavix) tab 75 mg  75 mg Oral Daily    aspirin DR tab 81 mg  81 mg Oral Daily       Medications Prior to Admission   Medication Sig    linaGLIPtin (TRADJENTA) 5 mg Oral Tab Take 1 tablet (5 mg total) by mouth daily.    hydrALAZINE 25 MG Oral Tab Take 1 tablet (25 mg total) by mouth every 8 (eight) hours.    clopidogrel 75 MG Oral Tab Take 1 tablet (75 mg total) by mouth daily.    escitalopram 5 MG Oral Tab Take 1 tablet (5 mg total) by mouth daily.    carvedilol 25 MG Oral Tab Take 1 tablet (25 mg total) by mouth 2 (two) times daily.    atorvastatin 40 MG Oral Tab Take 1 tablet (40 mg total) by mouth nightly.    apixaban 5 MG Oral Tab Take 1 tablet (5 mg total) by mouth 2 (two) times daily.    acetaminophen 500 MG Oral Tab Take 1 tablet (500 mg total) by mouth daily as needed for Pain.    cetirizine 10 MG Oral Tab Take 1 tablet (10 mg total) by mouth every other day.    Cholecalciferol 125 MCG (5000 UT) Oral Tab Take 1 tablet (5,000 Units total) by mouth 2 (two) times daily.    tetrahydrozoline 0.05 % Ophthalmic Solution 1 drop 2 (two) times daily as needed.    felodipine ER 10 MG Oral Tablet 24 Hr Take 1 tablet (10 mg total) by mouth daily.     fluticasone propionate 50 MCG/ACT Nasal Suspension 2 sprays by Nasal route daily.    pantoprazole 40 MG Oral Tab EC Take 1 tablet (40 mg total) by mouth every morning before breakfast.    albuterol (PROAIR HFA) 108 (90 Base) MCG/ACT Inhalation Aero Soln Inhale 2 puffs into the lungs every 4 (four) hours as needed for Wheezing.    Budesonide-Formoterol Fumarate (SYMBICORT) 160-4.5 MCG/ACT Inhalation Aerosol Inhale 2 puffs into the lungs 2 (two) times daily. (Patient taking differently: Inhale 2 puffs into the lungs 2 (two) times daily as needed.)    Darbepoetin Randell 60 MCG/ML Injection Solution Inject into the vein as needed.    polyethylene glycol, PEG 3350, 17 g Oral Powd Pack 17 g Wed, Thurs, Sat    traMADol 50 MG Oral Tab Take 1 tablet (50 mg total) by mouth every 12 (twelve) hours as needed for Pain.    Glucose Blood (CONTOUR NEXT TEST) In Vitro Strip Test 3 times daily    methocarbamol 500 MG Oral Tab Take 1 tablet (500 mg total) by mouth 2 (two) times daily as needed. (Patient not taking: Reported on 6/13/2024)    Insulin Pen Needle (PEN NEEDLES) 32G X 4 MM Does not apply Misc 1 each daily.         Allergies  Allergies   Allergen Reactions    Adhesive Tape OTHER (SEE COMMENTS)     Severe rashes    Dust Mite Extract RASH       Physical Exam:   Blood pressure 123/54, pulse 74, temperature 98 °F (36.7 °C), temperature source Oral, resp. rate 18, height 5' 4\" (1.626 m), weight 156 lb 8 oz (71 kg), SpO2 96%.    General appearance:  alert, appears stated age and cooperative    HEENT: negative findings: lids and lashes normal and conjunctivae and sclerae normal.     Pulmonary: clear to auscultation bilaterally of anterior chest    Cardiovascular: regular rate and rhythm    Abdominal: soft, bowel sounds present; non-distended, non-tender    Extremities: No edema, cyanosis, or clubbing    Skin: warm and dry    Neurologic: Grossly normal    Psychiatric: calm      Results:     Laboratory Data:  Lab Results   Component  Value Date    WBC 7.8 06/17/2024    HGB 7.1 (L) 06/17/2024    HCT 22.1 (L) 06/17/2024    .0 06/17/2024    CREATSERUM 6.06 (H) 06/17/2024    BUN 39 (H) 06/17/2024     (L) 06/17/2024    K 3.3 (L) 06/17/2024    CL 96 (L) 06/17/2024    CO2 30.0 06/17/2024     (H) 06/17/2024    CA 8.5 (L) 06/17/2024    ALB 3.1 (L) 06/17/2024    ALKPHO 43 (L) 06/16/2024    TP 5.2 (L) 06/16/2024    AST 16 06/16/2024    ALT 12 06/16/2024    T4F 0.9 08/31/2022    TSH 2.060 06/23/2023     (H) 07/25/2023    PSA 2.94 10/20/2021    ESRML 10 06/16/2024    CRP 1.30 (H) 06/16/2024    MG 2.1 11/20/2023    PHOS 5.0 06/17/2024    TROP <0.045 07/25/2019     08/05/2023    B12 1,237 (H) 06/16/2024       Imaging:  US VENOUS DOPPLER LEG RIGHT - DIAG IMG (CPT=93971)    Result Date: 6/16/2024  CONCLUSION: No evidence of right lower extremity DVT.    Dictated by (CST): Oseas Good MD on 6/16/2024 at 10:30 AM     Finalized by (CST): Oseas Good MD on 6/16/2024 at 10:31 AM            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)    Atherosclerosis of native arteries of extremities with intermittent claudication, bilateral legs (HCC)    Primary hypertension    Smokers' cough (HCC)    Unstable angina (HCC)    Lower GI bleed    ESRD (end stage renal disease) on dialysis (HCC)    Atrial fibrillation (HCC)    AVM  (arteriovenous malformation) of colon    Anemia, blood loss    Rectal bleeding    Anticoagulated    Antiplatelet or antithrombotic long-term use     Rectal bleeding              -no active or high risk stigmata on colonoscopy   - continue to monitor                         Constipation              -miralax daily           CAD s/p recent PCI              -on antiplatelet therapy    Acute drop in hemoglobin with elevated ferritin and low iron studies support anemia of chronic disease and related to ESRD. CRP elevated.  S/p IV iron   -renal following. Consideration for Epo   -transfuse prn   -if overt GIB and dropping Hgb, consider for inpatient VCE      Thank you for allowing me to participate in the care of your patient.    Time spent in direct patient contact and decision making as well as counseling/coordination of care:  50 minutes      Note to patient: The 21st Century Cures Act makes medical notes like these available to patients in the interest of transparency. However, this is a medical document intended as peer to peer communication. It is written in medical language and may contain abbreviations or verbiage that are unfamiliar. It may appear blunt or direct. Medical documents are intended to carry relevant information, facts as evident, and the clinical opinion of the practitioner.      DO Faith Yao [facility]  Gastroenterology

## 2024-06-17 NOTE — PLAN OF CARE
Problem: Patient Centered Care  Goal: Patient preferences are identified and integrated in the patient's plan of care  Description: Interventions:  - What would you like us to know as we care for you? Wants hemoglobin stabilized  - Provide timely, complete, and accurate information to patient/family  - Incorporate patient and family knowledge, values, beliefs, and cultural backgrounds into the planning and delivery of care  - Encourage patient/family to participate in care and decision-making at the level they choose  - Honor patient and family perspectives and choices  Outcome: Progressing     Problem: Diabetes/Glucose Control  Goal: Glucose maintained within prescribed range  Description: INTERVENTIONS:  - Monitor Blood Glucose as ordered  - Assess for signs and symptoms of hyperglycemia and hypoglycemia  - Administer ordered medications to maintain glucose within target range  - Assess barriers to adequate nutritional intake and initiate nutrition consult as needed  - Instruct patient on self management of diabetes  Outcome: Progressing     Problem: Patient/Family Goals  Goal: Patient/Family Long Term Goal  Description: Patient's Long Term Goal: To get discharge home    Interventions:  - Help to initiate discharge process and communication  - See additional Care Plan goals for specific interventions  Outcome: Progressing  Goal: Patient/Family Short Term Goal  Description: Patient's Short Term Goal: to get colonoscopy    Interventions:   -Administer schedule prep on time, and prepare patient for colonoscopy  - See additional Care Plan goals for specific interventions  Outcome: Progressing     Problem: PAIN - ADULT  Goal: Verbalizes/displays adequate comfort level or patient's stated pain goal  Description: INTERVENTIONS:  - Encourage pt to monitor pain and request assistance  - Assess pain using appropriate pain scale  - Administer analgesics based on type and severity of pain and evaluate response  - Implement  non-pharmacological measures as appropriate and evaluate response  - Consider cultural and social influences on pain and pain management  - Manage/alleviate anxiety  - Utilize distraction and/or relaxation techniques  - Monitor for opioid side effects  - Notify MD/LIP if interventions unsuccessful or patient reports new pain  - Anticipate increased pain with activity and pre-medicate as appropriate  Outcome: Progressing     Problem: RISK FOR INFECTION - ADULT  Goal: Absence of fever/infection during anticipated neutropenic period  Description: INTERVENTIONS  - Monitor WBC  - Administer growth factors as ordered  - Implement neutropenic guidelines  Outcome: Progressing     Problem: SAFETY ADULT - FALL  Goal: Free from fall injury  Description: INTERVENTIONS:  - Assess pt frequently for physical needs  - Identify cognitive and physical deficits and behaviors that affect risk of falls.  - [place] fall precautions as indicated by assessment.  - Educate pt/family on patient safety including physical limitations  - Instruct pt to call for assistance with activity based on assessment  - Modify environment to reduce risk of injury  - Provide assistive devices as appropriate  - Consider OT/PT consult to assist with strengthening/mobility  - Encourage toileting schedule  Outcome: Progressing     Problem: DISCHARGE PLANNING  Goal: Discharge to home or other facility with appropriate resources  Description: INTERVENTIONS:  - Identify barriers to discharge w/pt and caregiver  - Include patient/family/discharge partner in discharge planning  - Arrange for needed discharge resources and transportation as appropriate  - Identify discharge learning needs (meds, wound care, etc)  - Arrange for interpreters to assist at discharge as needed  - Consider post-discharge preferences of patient/family/discharge partner  - Complete POLST form as appropriate  - Assess patient's ability to be responsible for managing their own health  -  Refer to Case Management Department for coordinating discharge planning if the patient needs post-hospital services based on physician/LIP order or complex needs related to functional status, cognitive ability or social support system  Outcome: Progressing     Problem: METABOLIC/FLUID AND ELECTROLYTES - ADULT  Goal: Electrolytes maintained within normal limits  Description: INTERVENTIONS:  - Monitor labs and rhythm and assess patient for signs and symptoms of electrolyte imbalances  - Administer electrolyte replacement as ordered  - Monitor response to electrolyte replacements, including rhythm and repeat lab results as appropriate  - Fluid restriction as ordered  - Instruct patient on fluid and nutrition restrictions as appropriate  Outcome: Progressing     Problem: HEMATOLOGIC - ADULT  Goal: Maintains hematologic stability  Description: INTERVENTIONS  - Assess for signs and symptoms of bleeding or hemorrhage  - Monitor labs and vital signs for trends  - Administer supportive blood products/factors, fluids and medications as ordered and appropriate  - Administer supportive blood products/factors as ordered and appropriate  Outcome: Progressing     Ollie was up and ambulating well in room, voiding freely in the bathroom. Pt denied having any pain at this time. HD scheduled later today. Plan for discharge back to home when medically stable.

## 2024-06-17 NOTE — PROGRESS NOTES
[facility]  part of [facility]    Progress Note    Ollie Hernández Patient Status:  Inpatient    1945 MRN [id number]   Location [facility] 4W/SW/SE Attending Wili Parmar MD   Hosp Day # 4 PCP Wili Parmar MD       Subjective:   Ollie Hernández is a(n) 79 year old male     ROS:     Constitutional: Eating his first solid food  ENMT:  Negative for ear drainage, hearing loss and nasal drainage  Eyes:  Negative for eye discharge and vision loss  Cardiovascular:  Negative for chest pain, sob, irregular heartbeat/palpitations  Respiratory:  Negative for cough, dyspnea and wheezing  Gastrointestinal: Denies abdominal pain or obvious GI bleeding  Genitourinary:  Negative for dysuria and hematuria  Endocrine:  Negative for abnormal sleep patterns, increased activity, polydipsia and polyphagia  Hema/Lymph:  Negative for easy bleeding and easy bruising  Integumentary:  Negative for pruritus and rash  Musculoskeletal:  Negative for bone/joint symptoms  Neurological:  Negative for gait disturbance  Psychiatric:  Negative for inappropriate interaction and psychiatric symptoms      Vitals:    24 1625   BP: 123/52   Pulse:    Resp: 18   Temp: 98.2 °F (36.8 °C)           PHYSICAL EXAM:   Constitutional: appears well hydrated alert and responsive no acute distress noted  Head/Face: normocephalic  Eyes/Vision: normal extraocular motion is intact  Nose/Mouth/Throat:mucous membranes are moist and no oral lesions are noted  Neck/Thyroid: neck is supple without adenopathy  Lymphatic: no abnormal cervical, supraclavicular adenopathy is noted  Respiratory:  lungs are clear to auscultation bilaterally, normal respiratory effort  Cardiovascular: regular rate and rhythm no murmurs, gallups, or rubs  Abdomen: soft, non-tender, non-distended, BS normal  Vascular: well perfused femoral, and pedal pulses normal  Skin/Hair: no unusual rashes present, no abnormal bruising noted  Back/Spine: no  abnormalities noted  Musculoskeletal: full ROM all extremities good strength  no deformities  Extremities: no edema, cyanosis  Neurological:  Grossly normal    Results:     Laboratory Data:  Lab Results   Component Value Date    WBC 8.6 06/17/2024    HGB 8.9 (L) 06/17/2024    HCT 28.1 (L) 06/17/2024    .0 06/17/2024    CREATSERUM 6.06 (H) 06/17/2024    BUN 39 (H) 06/17/2024     (L) 06/17/2024    K 3.3 (L) 06/17/2024    CL 96 (L) 06/17/2024    CO2 30.0 06/17/2024     (H) 06/17/2024    CA 8.5 (L) 06/17/2024    ALB 3.1 (L) 06/17/2024    ALKPHO 43 (L) 06/16/2024    BILT 0.5 06/16/2024    TP 5.2 (L) 06/16/2024    AST 16 06/16/2024    ALT 12 06/16/2024    T4F 0.9 08/31/2022    TSH 2.060 06/23/2023     (H) 07/25/2023    PSA 2.94 10/20/2021    ESRML 10 06/16/2024    CRP 1.30 (H) 06/16/2024    MG 2.1 11/20/2023    PHOS 5.0 06/17/2024    TROP <0.045 07/25/2019     08/05/2023    B12 1,237 (H) 06/16/2024       Imaging:  [unfilled]    VENOUS DOPPLER LEG RIGHT - DIAG IMG (CPT=93971)    Result Date: 6/16/2024  CONCLUSION: No evidence of right lower extremity DVT.    Dictated by (CST): Oseas Good MD on 6/16/2024 at 10:30 AM     Finalized by (CST): Oseas Good MD on 6/16/2024 at 10:31 AM             ASSESSMENT/PLAN:   Assessment       #1 ESRD labs okay had dialysis today next on Wednesday  #2 GI bleed  Stable is just on aspirin and Plavix no Eliquis right now after a month   Can just be on aspirin and Eliquis  #3 hypertension stable  #4 anemia received blood hemoglobin 8.9 today repeat in the morning is on Epogen    Hopefully home soon    6/17/2024  José Callahan MD

## 2024-06-17 NOTE — PAYOR COMM NOTE
--------------  CONTINUED STAY REVIEW------CLINICALS FOR 6/14 6/15 6/16 6/17      Payor: [employer]  Subscriber #:  [id number]  Authorization Number: [id number]    Admit date: 6/13/24  Admit time:  1:42 PM    6/14   Reason for Consultation:  Bleeding     History of Present Illness:  Ollie Hernández is a a(n) 79 year old male with coronary artery disease and PCI to circumflex (5/21/2024), paroxysmal atrial fibrillation on Eliquis, chronic HFpEF, ESRD on hemodialysis who presents with recurrent rectal bleeding.     His PCI was on 5/21/2024.  He was discharged on 1 week of triple therapy with aspirin 81 mg, Plavix, Eliquis.  Patient then stopped aspirin as planned.  He presented to [facility] on 6/1/2024 with rectal bleeding for 3 days while on dual antithrombotic therapy with Plavix and Eliquis.  Anticoagulation was held while patient underwent endoscopy, and he had APC of bleeding lesion and removal of a sessile polyp.     Placed back on Plavix plus Eliquis (restarted 6/5/24) and discharged.  He felt well without any bleeding until this morning at 7 AM, when he had a large bloody bowel movement with bright red blood.     Subjective:  Patient had bloody stool during dialysis with drop in Hgb to 6.7. He received 1U pRBCs.     Assessment/Plan:     ABLA Hgb 11.5 (5/22/24) -> 9.8   - Reviewed records: Hgb appears somewhat variable but baseline appears to be 10-11.  - Underwent colonoscopy s/p APC of bleeding lesion and removal of sessile polyp.  CAD - PCI LCX with [place] ABIMBOLA x 1 (5/21/24)  - Restarted Eliquis + plavix on 6/5/24  - Eliquis stopped on 6/13/24 (last dose by patient 6/12/24 pm).  PAF on Eliquis  NSVT on Zio patch  Chronic HFpEF - compensated  ESRD on HD MWF  DM2  HTN  KRAIG on CPAP  LVEF 60% (echo 9/23)     Plan  Risk of life threatening stent thrombosis outweighs risk of life threatening hemorrhage at present.   Restart DAPT given above given risk of instent thrombosis in this time  period. Bleeding may be residual effects of Eliquis.  Repeat Hgb and transfuse for < 8.  Can do DAPT x 1 month as he received a [place] ABIMBOLA (FDA approved for shorter course of DAPT).  Can transition to ASA + Eliquis after 1 month of DAPT for a total duration of 6 months.  Patient currently hypertensive, continue home meds.     History:      Past Medical History:    Anemia    Asthma (HCC)    Back problem    BPH (benign prostatic hyperplasia)    Calculus of kidney    Cataract    Diabetes (HCC)    ESRD (end stage renal disease) on dialysis (HCC)    Essential hypertension    High blood pressure    High cholesterol    History of blood transfusion    Hyperlipidemia    Neuropathy     hands and feet    KRAIG on CPAP    Renal disorder    Sleep apnea    Visual impairment     glasses    Vocal cord paralysis, unilateral partial            Past Surgical History:   Procedure Laterality Date    Appendectomy         1981    Back surgery         Neck/back - 1998    Capsule endoscopy - internal referral        Cataract         12/2021 and 1/2022    Colonoscopy        Colonoscopy N/A 1/25/2021     Procedure: COLONOSCOPY;  Surgeon: Michael Gautam MD;  Location: [facility] ENDO    Colonoscopy N/A 6/3/2024     Procedure: COLONOSCOPY;  Surgeon: Gabriel Saldana MD;  Location: [facility] ENDOSCOPY    Hand/finger surgery unlisted         Accidental trauma    Upper gi endoscopy,diagnosis                Family History   Problem Relation Age of Onset    Cancer Father           Kidney    Breast Cancer Mother      Diabetes Mother      Diabetes Maternal Grandmother      Diabetes Maternal Grandfather      Heart Disorder Other           Uncle       reports that he quit smoking about 43 years ago. His smoking use included cigarettes. He started smoking about 60 years ago. He has a 17 pack-year smoking history. He has never used smokeless tobacco. He reports that he does not drink alcohol and does not use drugs.     Allergies:        Allergies   Allergen  Reactions    Adhesive Tape OTHER (SEE COMMENTS)       Severe rashes    Dust Mite Extract RASH         Medications:            Current Facility-Administered Medications on File Prior to Encounter   Medication Dose Route Frequency Provider Last Rate Last Admin    [COMPLETED] iron sucrose (Venofer) 300 mg in sodium chloride 0.9% 250 mL IVPB  300 mg Intravenous Daily José Callahan .7 mL/hr at 24 1133 300 mg at 24 1133    [COMPLETED] polyethylene glycol-electrolyte (Golytely) 236 g oral solution 2,000 mL  2,000 mL Oral Once Emilio Talley MD   2,000 mL at 24 0456    [COMPLETED] polyethylene glycol-electrolyte (Golytely) 236 g oral solution 2,000 mL  2,000 mL Oral Once Emilio Talley MD   2,000 mL at 24 1800    [] sodium chloride 0.9 % IV bolus 100 mL  100 mL Intravenous Q30 Min PRN Walker Klein MD         And    [] albumin human (Albumin) 25% injection 25 g  25 g Intravenous PRN Dialysis Walker Klein MD        [COMPLETED] acetaminophen (Tylenol Extra Strength) tab 1,000 mg  1,000 mg Oral Once Devaughn Montiel, DO   1,000 mg at 24 0345    [COMPLETED] sodium chloride 0.9% infusion   Intravenous On Call Luis Orozco MD   Stopped at 24 0000    [COMPLETED] lidocaine PF (Xylocaine-MPF) 2 % injection                [COMPLETED] heparin in sodium chloride 0.9% (Porcine) 2 Units/mL flush bag premix                [COMPLETED] heparin in sodium chloride 0.9% (Porcine) 2 Units/mL flush bag premix                [COMPLETED] fentaNYL (Sublimaze) 50 mcg/mL injection                [COMPLETED] midazolam (Versed) 2 MG/2ML injection                [COMPLETED] heparin (Porcine) 1000 UNIT/ML injection                [COMPLETED] Nitroglycerin in D5W 200-5 MCG/ML-% injection                [COMPLETED] midazolam (Versed) 2 MG/2ML injection                [COMPLETED] iohexol (Omnipaque) 300 MG/ML injection 350 mL  350 mL Intravenous ONCE PRN Luis Orozco MD   230 mL  at 24 1601    [] sodium chloride 0.9% infusion   Intravenous Continuous Luis Orozco MD   Stopped at 24 0000             Current Outpatient Medications on File Prior to Encounter   Medication Sig Dispense Refill    linaGLIPtin (TRADJENTA) 5 mg Oral Tab Take 1 tablet (5 mg total) by mouth daily. 90 tablet 1    hydrALAZINE 25 MG Oral Tab Take 1 tablet (25 mg total) by mouth every 8 (eight) hours. 90 tablet 1    clopidogrel 75 MG Oral Tab Take 1 tablet (75 mg total) by mouth daily. 90 tablet 1    escitalopram 5 MG Oral Tab Take 1 tablet (5 mg total) by mouth daily. 90 tablet 3    carvedilol 25 MG Oral Tab Take 1 tablet (25 mg total) by mouth 2 (two) times daily.        atorvastatin 40 MG Oral Tab Take 1 tablet (40 mg total) by mouth nightly. 90 tablet 3    apixaban 5 MG Oral Tab Take 1 tablet (5 mg total) by mouth 2 (two) times daily.        acetaminophen 500 MG Oral Tab Take 1 tablet (500 mg total) by mouth daily as needed for Pain.        cetirizine 10 MG Oral Tab Take 1 tablet (10 mg total) by mouth every other day.        Cholecalciferol 125 MCG (5000 UT) Oral Tab Take 1 tablet (5,000 Units total) by mouth 2 (two) times daily.        tetrahydrozoline 0.05 % Ophthalmic Solution 1 drop 2 (two) times daily as needed.        felodipine ER 10 MG Oral Tablet 24 Hr Take 1 tablet (10 mg total) by mouth daily. 90 tablet 3    fluticasone propionate 50 MCG/ACT Nasal Suspension 2 sprays by Nasal route daily. 48 g 3    pantoprazole 40 MG Oral Tab EC Take 1 tablet (40 mg total) by mouth every morning before breakfast. 90 tablet 3    albuterol (PROAIR HFA) 108 (90 Base) MCG/ACT Inhalation Aero Soln Inhale 2 puffs into the lungs every 4 (four) hours as needed for Wheezing. 3 each 3    Budesonide-Formoterol Fumarate (SYMBICORT) 160-4.5 MCG/ACT Inhalation Aerosol Inhale 2 puffs into the lungs 2 (two) times daily. (Patient taking differently: Inhale 2 puffs into the lungs 2 (two) times daily as needed.) 3 each 3     Darbepoetin Randell 60 MCG/ML Injection Solution Inject into the vein as needed.        polyethylene glycol, PEG 3350, 17 g Oral Powd Pack 17 g Wed, Thurs, Sat        traMADol 50 MG Oral Tab Take 1 tablet (50 mg total) by mouth every 12 (twelve) hours as needed for Pain.        Glucose Blood (CONTOUR NEXT TEST) In Vitro Strip Test 3 times daily 300 each 1    methocarbamol 500 MG Oral Tab Take 1 tablet (500 mg total) by mouth 2 (two) times daily as needed. (Patient not taking: Reported on 6/13/2024) 60 tablet 1    Insulin Pen Needle (PEN NEEDLES) 32G X 4 MM Does not apply Misc 1 each daily. 100 each 0         Review of Systems:  Constitutional: denies fevers, chills, night sweats  HEENT: denies headache, vision changes, trouble or pain with swallowing  Cardiac: denies chest pain, palpitations, edema  Pulm: denies dyspnea, cough, wheeze  GI: denies n/v, abd pain, diarrhea or constipation  : denies hematuria, dysuria, incontinence  MSK: denies muscle or joint pains  Neuro: denies numbness, weakness, paresthesias  Psych: denies anxiety, depression  Integument: denies skin rashes or lesions  Heme: denies easy bruising or bleeding  Endo: denies heat/cold intolerance, skin or nail changes        Physical Exam:  Blood pressure 124/55, pulse 69, temperature 98.3 °F (36.8 °C), temperature source Oral, resp. rate 18, height 5' 4\" (1.626 m), weight 156 lb 8 oz (71 kg), SpO2 98%.      Wt Readings from Last 3 Encounters:   06/13/24 156 lb 8 oz (71 kg)   06/05/24 150 lb 4.8 oz (68.2 kg)   05/28/24 163 lb 9.6 oz (74.2 kg)        General: awake, alert, oriented x 3, no acute distress  HEENT: at/nc, perrl, eomi  Neck: No JVD, carotids 2+ no bruits.  Cardiac: Regular rate and rhythm, S1, S2 normal, no murmur, rub or gallop.  Lungs: Clear without wheezes, rales, rhonchi or dullness.  Normal excursions and effort.  Abdomen: Soft, non-tender, non-distended, normal bowel sounds   Extremities: Without clubbing, cyanosis or edema.   Peripheral pulses are 2+.  Neurologic: Alert and oriented, normal affect.  Psych: normal mood and affect  Skin: Warm and dry.         Laboratories and Data:  Diagnostics:        Labs:   CBC:          Lab Results   Component Value Date     WBC 6.2 06/14/2024     WBC 6.3 06/13/2024     WBC 5.4 06/13/2024            Lab Results   Component Value Date     HGB 7.9 (L) 06/14/2024     HGB 6.7 (LL) 06/14/2024     HGB 9.5 (L) 06/13/2024            Lab Results   Component Value Date     .0 06/14/2024     .0 06/13/2024     .0 06/13/2024      BMP:   No results found for: \"GLUCOSE\"        Lab Results   Component Value Date     K 3.7 06/14/2024     K 4.0 06/13/2024     K 3.4 (L) 06/06/2024            Lab Results   Component Value Date     BUN 50 (H) 06/14/2024     BUN 43 (H) 06/13/2024     BUN 30 (H) 06/06/2024            Lab Results   Component Value Date     CREATSERUM 5.24 (H) 06/14/2024     CREATSERUM 4.74 (H) 06/13/2024     CREATSERUM 4.55 (H) 06/06/2024      Cholesterol:           Lab Results   Component Value Date     CHOLEST 170 06/23/2023     CHOLEST 157 11/23/2022     CHOLEST 168 08/31/2022            Lab Results   Component Value Date     HDL 43 06/23/2023     HDL 36 (L) 11/23/2022     HDL 43 08/31/2022            Lab Results   Component Value Date     TRIG 132 06/23/2023     TRIG 143 11/23/2022     TRIG 109 08/31/2022            Lab Results   Component Value Date      (H) 06/23/2023     LDL 96 11/23/2022      (H) 08/31/2022            Lab Results   Component Value Date     AST 25 06/13/2024     AST 25 06/01/2024     AST 31 08/05/2023            Lab Results   Component Value Date     ALT 27 06/13/2024     ALT 26 06/01/2024     ALT 32 08/05/2023               Luis Orozco MD  Interventional Cardiology  [place]                    6/15   SUBJECTIVE:  Did not tolerate colon prep well; stools are running clear liquid  Pt still feeling bloated     OBJECTIVE:  /50 (BP Location: Left arm)    Pulse 77   Temp 97.4 °F (36.3 °C) (Oral)   Resp 18   Ht 5' 4\" (1.626 m)   Wt 156 lb 8 oz (71 kg)   SpO2 96%   BMI 26.86 kg/m²      Intake/Output:  I/O last 3 completed shifts:  In: 328.3 [Blood:278.3; IV PIGGYBACK:50]  Out: -          Wt Readings from Last 3 Encounters:   06/13/24 156 lb 8 oz (71 kg)   06/05/24 150 lb 4.8 oz (68.2 kg)   05/28/24 163 lb 9.6 oz (74.2 kg)         Exam  Gen: No acute distress, alert and oriented x3  Pulm: Lungs CTAB, no rhonchi or wheezing  CV: Heart irr no murmurs   Abd: Abdomen soft, moderate distension, NABS  MSK: Full range of motion in extremities, no clubbing, no cyanosis, no edema        Labs:          Recent Labs   Lab 06/13/24  1450 06/14/24  0340 06/14/24  1244 06/15/24  0507   RBC 3.09* 2.20*  --  2.47*   HGB 9.5* 6.7* 7.9* 7.3*   HCT 29.6* 20.8*  --  23.1*   MCV 95.8 94.5  --  93.5   MCH 30.7 30.5  --  29.6   MCHC 32.1 32.2  --  31.6   RDW 18.3* 18.2*  --  19.4*   NEPRELIM 4.04 4.17  --  6.91   WBC 6.3 6.2  --  9.0   .0 185.0  --  193.0                  Recent Labs   Lab 06/13/24  0849 06/14/24  0340 06/15/24  0507   * 123* 137*  137*   BUN 43* 50* 28*  28*   CREATSERUM 4.74* 5.24* 3.99*  3.99*   EGFRCR 12* 11* 15*  15*   CA 9.5 9.0 9.0  9.0   ALB 3.8 3.1* 3.4  3.4    141 142  142   K 4.0 3.7 3.7  3.7    104 103  103   CO2 32.0 29.0 33.0*  33.0*   ALKPHO 57  --  47   AST 25  --  21   ALT 27  --  16   BILT 0.3  --  0.6   TP 6.7  --  5.8            Imaging:  No results found.              Assessment and Plan:              Rectal bleeding   Acute on chronic anemia  -pt with hx of constipation/hemorrhoids/rectal bleeding in the past (saw GI Dr. Gautam for this in 10/2020)  -last colonoscopy 1/25/21 (Dr. Gautam) -- internal hemorrhoids, colon polyps x 2 (one tubular adenoma)  Recent egd/colonoscopy for GI bleeding 6/1/2024 demonstrating hepatic flexure AVM, which was cauterized  -eliquis on hold  -s/p 1prbc yesterday; Hgb  6.7>7.9>7.3  -appreciate GI consult; plan for colonoscopy this morning        CAD  -[facility] 5/21/2024 by Dr. Luis Orozco: 70% lesion of apical LAD, 80% lesion of LCx  -S/p PCI of LCx (medical management of the LAD lesion)  -s/p aspirin, plavix and eliquis x 1 week, now just plavix and eliquis  -cardiology consulted--resume aspirin and plavix; plan to hold eliquis until completing 1 month DAPT     Pharyngeal dysphagia  --right side hypomobile oropharyngeal dysphagia  - swallow study 4/2024: intermittent shallow and deep laryngeal penetration without aspiration, small zenker's diverticulum, bulky endplate osteophytes contributing to dysphagia  - to see Dr. Del Angel (ENT at [facility]) for further imaging     Paroxysmal A-fib  --dx'ed 9/2023  --Zio monitor 10/2023 -- NSVT (normal EF 65%); on carvedilol  - followed by EP Dr. Phelan  --Eliquis on hold     Cervical radiculopathy  Chronic back pain with neuropathy  -CT scan of the cervical neck 7/2019 ; history of cervical decompressive laminectomy with multilevel degenerative disc disease  - s/p physical therapy in the past  --MRI brain and MRI cervical spine done 12/29/22 (MRI brain w/mod chronic microvascular ischemic changes bilat cerebral hemispheres, basal ganglia and thalami; MRI cervical spine w/multilevel spinal stenosis)  --saw NS PA Dr. Araujo in 1/2023; had subsequent MRI thoracic/lumbar spine.  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  Pt was referred for PT in 2/2023.      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 Sevilla  --HgbA1c 5.5 in 9/2023  - Gabapentin for neuropathy  --at home: on Tradjenta 5mg daily (no longer on insulin)  -- SSI in hospital  --BS 140s     ESRD on hemodialysis  - 2/2 prolonged history of diabetes and hypertension  - Follows with  Dr. Martinez  -Last hemodialysis with 1 L out 6/5/2024 per Dr. Callahan     Hypertension  - carvedilol 25 mg BID, hydralazine 25mg q8h, felodipine 10mg/day (on amlodipine in hosp due to formulary)     Hyperlipidemia  - Continue with On atorvastatin 40 mg daily, aspirin     BPH  -no longer takes trospium or tamsulosin     Pulmonary hypertension  -Comanagement of KRAIG and chronic diastolic heart failure  - stable     KRAIG on CPAP  - sees pulm Dr. Landeros     Cataracts  -Seems to be stable, follows with Dr. Chase of ophthalmology     Gout  Flareup in 6/2022. NO recurrence  -Seems to be stable     Anemia of ESRD  -Baseline hemoglobin seems to be around 10-11  -managed by nephrology; on aranesp  -Hgb decreased as above     Sensorineural hearing loss  Mild-moderate per audiology testing/14/2022.  He may be a hearing aid candidate in the future     Unsteady Gait  Ongoing fatigue due to multiple comorbidities as above  - uses a walker, 2 wheeled-2 constipation.  Does not want a rollator walker at this time  - He is a fall risk with his unsteady gait, fatigue.     Anxiety  Lexapro 5 mg once a day.                Ebonie Simmons DO  6/15/2024  6:51 AM    6/16   SUBJECTIVE:  Pt doing ok  Took a while to wake up from anesthesia  Feeling gas, nauseated --gasx is helping  Currently receiving transfusion     OBJECTIVE:  /62 (BP Location: Left arm)   Pulse 77   Temp 98.1 °F (36.7 °C) (Oral)   Resp 20   Ht 5' 4\" (1.626 m)   Wt 156 lb 8 oz (71 kg)   SpO2 97%   BMI 26.86 kg/m²      Intake/Output:  I/O last 3 completed shifts:  In: -   Out: 300 [Stool:300]         Wt Readings from Last 3 Encounters:   06/13/24 156 lb 8 oz (71 kg)   06/05/24 150 lb 4.8 oz (68.2 kg)   05/28/24 163 lb 9.6 oz (74.2 kg)         Exam  Gen: No acute distress, alert and oriented x3  Pulm: Lungs CTAB, no rhonchi or wheezing  CV: Heart IRR  Abd: Abdomen soft, mildly distended  MSK: +RLE edema; no LLE edema  Skin: no rashes or lesions  Neuro: A&O x 3     Labs:          Recent Labs   Lab 06/15/24  0507 06/15/24  1657 06/16/24  0436   RBC 2.47* 2.40* 2.14*   HGB 7.3* 7.3* 6.4*   HCT 23.1* 22.0* 20.2*   MCV 93.5 91.7 94.4   MCH 29.6 30.4 29.9   MCHC 31.6 33.2 31.7   RDW 19.4* 19.0* 18.8*   NEPRELIM 6.91 5.65 5.07   WBC 9.0 8.3 7.1   .0 153.0 175.0                   Recent Labs   Lab 06/13/24  0849 06/14/24  0340 06/15/24  0507 06/16/24  0436   * 123* 137*  137* 106*   BUN 43* 50* 28*  28* 30*   CREATSERUM 4.74* 5.24* 3.99*  3.99* 5.07*   EGFRCR 12* 11* 15*  15* 11*   CA 9.5 9.0 9.0  9.0 9.0   ALB 3.8 3.1* 3.4  3.4 3.0*    141 142  142 139   K 4.0 3.7 3.7  3.7 3.2*    104 103  103 101   CO2 32.0 29.0 33.0*  33.0* 31.0   ALKPHO 57  --  47 43*   AST 25  --  21 16   ALT 27  --  16 12   BILT 0.3  --  0.6 0.5   TP 6.7  --  5.8 5.2*            Imaging:  No results found.              Assessment and Plan:        Rectal bleeding   Acute on chronic anemia  -pt with hx of constipation/hemorrhoids/rectal bleeding in the past (saw GI Dr. Gautam for this in 10/2020)  -last colonoscopy 1/25/21 (Dr. Gautam) -- internal hemorrhoids, colon polyps x 2 (one tubular adenoma)  Recent egd/colonoscopy for GI bleeding 6/1/2024 demonstrating hepatic flexure AVM, which was cauterized  -eliquis on hold  -s/p 1prbc 6/14/24; Hgb 6.7>7.9>7.3>6.4; receiving 1u PRBC today  -appreciate GI consult  -colonoscopy 6/15/2024: no active bleeding; ascending colon ulcer with single clip-clean based with small red spot, exacerbated by irrigation and second clip placed; small internal hemorrhoids-no bleeding, small 1-2mm splenic flexure polyp (not removed d/t high risk bleeding)     RLE edema  Check venous doppler     Abdominal gaseous distension  Gasx TID and HS     CAD  -C 5/21/2024 by Dr. Luis Orozco: 70% lesion of apical LAD, 80% lesion of LCx  -S/p PCI of LCx (medical management of the LAD lesion)  -s/p aspirin, plavix and eliquis x 1 week, now just plavix and  eliquis  -cardiology consulted--resume aspirin and plavix; plan to hold eliquis until completing 1 month DAPT     Pharyngeal dysphagia  --right side hypomobile oropharyngeal dysphagia  - swallow study 4/2024: intermittent shallow and deep laryngeal penetration without aspiration, small zenker's diverticulum, bulky endplate osteophytes contributing to dysphagia  - to see Dr. Del Angel (ENT at [facility]) for further imaging     Paroxysmal A-fib  --dx'ed 9/2023  --Zio monitor 10/2023 -- NSVT (normal EF 65%); on carvedilol  - followed by EP Dr. Phelan  --Eliquis on hold     Cervical radiculopathy  Chronic back pain with neuropathy  -CT scan of the cervical neck 7/2019 ; history of cervical decompressive laminectomy with multilevel degenerative disc disease  - s/p physical therapy in the past  --MRI brain and MRI cervical spine done 12/29/22 (MRI brain w/mod chronic microvascular ischemic changes bilat cerebral hemispheres, basal ganglia and thalami; MRI cervical spine w/multilevel spinal stenosis)  --saw NS PA Dr. Araujo in 1/2023; had subsequent MRI thoracic/lumbar spine.  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  Pt was referred for PT in 2/2023.      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 Sevilla  --HgbA1c 5.5 in 9/2023  - Gabapentin for neuropathy  --at home: on Tradjenta 5mg daily (no longer on insulin)  -- SSI in hospital  --BS 140s     ESRD on hemodialysis  - 2/2 prolonged history of diabetes and hypertension  - Follows with Dr. Martinez  -Last hemodialysis with 1 L out 6/5/2024 per Dr. Callahan     Hypertension  - carvedilol 25 mg BID, hydralazine 25mg q8h, felodipine 10mg/day (on amlodipine in hosp due to formulary)     Hyperlipidemia  - Continue with On atorvastatin 40 mg daily, aspirin     BPH  -no longer  takes trospium or tamsulosin     Pulmonary hypertension  -Comanagement of KRAIG and chronic diastolic heart failure  - stable     KRAIG on CPAP  - sees pulm Dr. Landeros     Cataracts  -Seems to be stable, follows with Dr. Chase of ophthalmology     Gout  Flareup in 6/2022. NO recurrence  -Seems to be stable     Anemia of ESRD  -Baseline hemoglobin seems to be around 10-11  -managed by nephrology; on aranesp  -Hgb decreased as above     Sensorineural hearing loss  Mild-moderate per audiology testing/14/2022.  He may be a hearing aid candidate in the future     Unsteady Gait  Ongoing fatigue due to multiple comorbidities as above  - uses a walker, 2 wheeled-2 constipation.  Does not want a rollator walker at this time  - He is a fall risk with his unsteady gait, fatigue.     Anxiety  Lexapro 5 mg once a day.                   Ebonie Simmons DO  6/16/2024  8:46 AM                  6/17  SUBJECTIVE:  Feels a little bit bloated since the colonoscopy.  However feeling better since admission.     OBJECTIVE:  /53 (BP Location: Left arm)   Pulse 76   Temp 97.6 °F (36.4 °C) (Oral)   Resp 20   Ht 5' 4\" (1.626 m)   Wt 156 lb 8 oz (71 kg)   SpO2 98%   BMI 26.86 kg/m²      Intake/Output:  I/O last 3 completed shifts:  In: 570 [P.O.:240; Blood:330]  Out: -          Wt Readings from Last 3 Encounters:   06/13/24 156 lb 8 oz (71 kg)   06/05/24 150 lb 4.8 oz (68.2 kg)   05/28/24 163 lb 9.6 oz (74.2 kg)         Exam  Gen: No acute distress, alert and oriented x3; examined while starting dialysis  Pulm: Lungs CTAB, no rhonchi or wheezing  CV: Heart IRR  Abd: Abdomen soft, mildly distended  MSK: +RLE edema; no LLE edema  Skin: no rashes or lesions  Neuro: A&O x 3     Labs:          Recent Labs   Lab 06/15/24  1657 06/16/24  0436 06/16/24  1055 06/17/24  0454   RBC 2.40* 2.14*  --  2.40*   HGB 7.3* 6.4* 7.4* 7.1*   HCT 22.0* 20.2*  --  22.1*   MCV 91.7 94.4  --  92.1   MCH 30.4 29.9  --  29.6   MCHC 33.2 31.7  --  32.1   RDW 19.0*  18.8*  --  18.7*   NEPRELIM 5.65 5.07  --  5.95   WBC 8.3 7.1  --  7.8   .0 175.0  --  167.0                    Recent Labs   Lab 06/13/24  0849 06/14/24  0340 06/15/24  0507 06/16/24  0436 06/17/24  0400   *   < > 137*  137* 106* 138*   BUN 43*   < > 28*  28* 30* 39*   CREATSERUM 4.74*   < > 3.99*  3.99* 5.07* 6.06*   EGFRCR 12*   < > 15*  15* 11* 9*   CA 9.5   < > 9.0  9.0 9.0 8.5*   ALB 3.8   < > 3.4  3.4 3.0* 3.1*      < > 142  142 139 135*   K 4.0   < > 3.7  3.7 3.2* 3.3*      < > 103  103 101 96*   CO2 32.0   < > 33.0*  33.0* 31.0 30.0   ALKPHO 57  --  47 43*  --    AST 25  --  21 16  --    ALT 27  --  16 12  --    BILT 0.3  --  0.6 0.5  --    TP 6.7  --  5.8 5.2*  --     < > = values in this interval not displayed.            Imaging:  US VENOUS DOPPLER LEG RIGHT - DIAG IMG (CPT=93971)     Result Date: 6/16/2024  CONCLUSION:        No evidence of right lower extremity DVT.    Dictated by (CST): Oseas Good MD on 6/16/2024 at 10:30 AM     Finalized by (CST): Oseas Good MD on 6/16/2024 at 10:31 AM                     Assessment and Plan:        Rectal bleeding   Acute on chronic anemia  -pt with hx of constipation/hemorrhoids/rectal bleeding in the past (saw GI Dr. Gautam for this in 10/2020)  -last colonoscopy 1/25/21 (Dr. Gautam) -- internal hemorrhoids, colon polyps x 2 (one tubular adenoma)  Recent egd/colonoscopy for GI bleeding 6/1/2024 demonstrating hepatic flexure AVM, which was cauterized  -eliquis on hold  -s/p 1prbc 6/14/24; Hgb 6.7>7.9>7.3>6.4; receiving 1u PRBC 6/16 -> 7.4 -> 7.1  -appreciate GI consult, started on IV Venofer x 1 dose 6/16  -colonoscopy 6/15/2024: no active bleeding; ascending colon ulcer with single clip-clean based with small red spot, exacerbated by irrigation and second clip placed; small internal hemorrhoids-no bleeding, small 1-2mm splenic flexure polyp (not removed d/t high risk bleeding)  - Plan to repeat 1 PM CBC after dialysis  completed     RLE edema  Check venous doppler  - No evidence of RLE DVT ( off eliquis)     Abdominal gaseous distension  Gasx TID and HS     CAD  -C 5/21/2024 by Dr. Luis Orozco: 70% lesion of apical LAD, 80% lesion of LCx  -S/p PCI of LCx (medical management of the LAD lesion)  -s/p aspirin, plavix and eliquis x 1 week, now just plavix and eliquis  -cardiology consulted--resume aspirin and plavix; plan to hold eliquis until completing 1 month DAPT     Pharyngeal dysphagia  --right side hypomobile oropharyngeal dysphagia  - swallow study 4/2024: intermittent shallow and deep laryngeal penetration without aspiration, small zenker's diverticulum, bulky endplate osteophytes contributing to dysphagia  - to see Dr. Del Angel (ENT at [facility]) for further imaging     Paroxysmal A-fib  --dx'ed 9/2023  --Zio monitor 10/2023 -- NSVT (normal EF 65%); on carvedilol  - followed by EP Dr. Phelan  --Eliquis on hold     Cervical radiculopathy  Chronic back pain with neuropathy  -CT scan of the cervical neck 7/2019 ; history of cervical decompressive laminectomy with multilevel degenerative disc disease  - s/p physical therapy in the past  --MRI brain and MRI cervical spine done 12/29/22 (MRI brain w/mod chronic microvascular ischemic changes bilat cerebral hemispheres, basal ganglia and thalami; MRI cervical spine w/multilevel spinal stenosis)  --saw NS PA Dr. Araujo in 1/2023; had subsequent MRI thoracic/lumbar spine.  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  Pt was referred for PT in 2/2023.      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 Sevilla  --HgbA1c 5.5 in 9/2023  - Gabapentin for neuropathy  --at home: on Tradjenta 5mg daily (no longer on insulin)  -- SSI in hospital  ---160     ESRD on hemodialysis  - 2/2  prolonged history of diabetes and hypertension  - Follows with Dr. Martinez  - Planning for HD today     Hypertension  - carvedilol 25 mg BID, hydralazine 25mg q8h, felodipine 10mg/day (on amlodipine in hosp due to formulary)     Hyperlipidemia  - Continue with On atorvastatin 40 mg daily, aspirin     BPH  -no longer takes trospium or tamsulosin     Pulmonary hypertension  -Comanagement of KRAIG and chronic diastolic heart failure  - stable     KRAIG on CPAP  - sees pulm Dr. Landeros     Cataracts  -Seems to be stable, follows with Dr. Chase of ophthalmology     Gout  Flareup in 6/2022. NO recurrence  -Seems to be stable     Anemia of ESRD  -Baseline hemoglobin seems to be around 10-11  -managed by nephrology; on aranesp  -Hgb decreased as above  - Started on IV Venofer as above     Sensorineural hearing loss  Mild-moderate per audiology testing/14/2022.  He may be a hearing aid candidate in the future     Unsteady Gait  Ongoing fatigue due to multiple comorbidities as above  - uses a walker, 2 wheeled-2 constipation.  Does not want a rollator walker at this time  - He is a fall risk with his unsteady gait, fatigue.     Anxiety  Lexapro 5 mg once a day.          VTE PPx: SCDs, holding eliquis         MEDICATIONS ADMINISTERED IN LAST 1 DAY:  atorvastatin (Lipitor) tab 40 mg       Date Action Dose Route User    6/16/2024 2003 Given 40 mg Oral Ricky Bejarano RN          carvedilol (Coreg) tab 25 mg       Date Action Dose Route User    6/16/2024 2003 Given 25 mg Oral Ricky Bejarano RN          hydrALAZINE (Apresoline) tab 25 mg       Date Action Dose Route User    6/17/2024 0544 Given 25 mg Oral Ricky Bejarano RN    6/16/2024 2214 Given 25 mg Oral Ricky Bejarano RN    6/16/2024 1606 Given 25 mg Oral Rosa Shields RN          insulin aspart (NovoLOG) 100 Units/mL FlexPen 1-5 Units       Date Action Dose Route User    6/16/2024 1313 Given 1 Units Subcutaneous (Left Upper Arm) Rosa Shields RN          iron  sucrose (Venofer) 20 mg/mL injection 200 mg       Date Action Dose Route User    6/16/2024 1605 New Bag 200 mg Intravenous Rosa Shields RN          pantoprazole (Protonix) DR tab 40 mg       Date Action Dose Route User    6/17/2024 0544 Given 40 mg Oral Ricky Bejarano RN          polyethylene glycol (PEG 3350) (Miralax) 17 g oral packet 17 g       Date Action Dose Route User    6/16/2024 1403 Given 17 g Oral Rosa Shields RN          simethicone (Mylicon) chewable tab 80 mg       Date Action Dose Route User    6/16/2024 1606 Given 80 mg Oral Rosa Shields RN            Vitals (last day)       Date/Time Temp Pulse Resp BP SpO2 Weight O2 Device O2 Flow Rate (L/min) [facility]    06/17/24 1221 -- 74 -- -- -- -- None (Room air) --     06/17/24 0754 98 °F (36.7 °C) -- 18 123/54 96 % -- None (Room air) -- [place]    06/17/24 0542 97.6 °F (36.4 °C) 76 20 140/53 98 % -- None (Room air) --     06/16/24 2212 -- 76 20 137/59 99 % -- None (Room air) --     06/16/24 2001 97.7 °F (36.5 °C) 75 20 120/49 98 % -- None (Room air) --     06/16/24 1904 -- 78 -- -- -- -- -- -- GT    06/16/24 1624 97.7 °F (36.5 °C) -- 18 136/53 100 % -- None (Room air) -- NH    06/16/24 1606 -- 67 -- 144/50 -- -- -- -- BM    06/16/24 1253 -- 71 -- 123/57 -- -- -- -- LV    06/16/24 1157 98.3 °F (36.8 °C) 66 20 114/48 97 % -- None (Room air) -- MW    06/16/24 0856 98.6 °F (37 °C) 71 18 132/47 95 % -- None (Room air) -- BM    06/16/24 0816 98.1 °F (36.7 °C) 77 20 127/62 97 % -- None (Room air) -- MW    06/16/24 0702 98.2 °F (36.8 °C) 73 20 131/72 98 % -- None (Room air) -- JT    06/16/24 0644 98.1 °F (36.7 °C) 71 20 129/46 96 % -- None (Room air) -- JT    06/16/24 0537 98 °F (36.7 °C) 73 16 135/49 100 % -- None (Room air) -- JT          CIWA Scores (since admission)       None          Blood Transfusion Record       Product Unit Status Volume Start End            Transfuse RBC       24  [id number]  [id number] Completed 06/16/24 0858 330 mL  06/16/24 0646 06/16/24 0856       24  [id number]  [id number] Completed 06/14/24 0916 278.33 mL 06/14/24 0752 06/14/24 0845

## 2024-06-17 NOTE — OCCUPATIONAL THERAPY NOTE
Orders received, chart reviewed for OT evaluation. Attempt made this morning - pt is currently off unit for hd. Will follow-up this pm as schedule allows and pt appropriate.

## 2024-06-17 NOTE — PLAN OF CARE
Alert and oriented x4. Monitoring vital signs- stable at this time. No acute changes noted throughout shift. Hemodialysis done today. AV fistula Right arm (bruit heard). Monitoring blood glucose levels per order. Tolerating diet. Voiding. Plavix for DVT prophylaxis. Up standby assist with a walker. HGB (8.9). Fall precautions maintained- bed alarm on, bed locked in lowest position, call light and personal belongings within reach, non-skid socks in place to bilateral feet. Frequent rounding by nursing staff. Plan to be discharged home, pending medical clearance and continuous HGB stability.      Problem: Patient Centered Care  Goal: Patient preferences are identified and integrated in the patient's plan of care  Description: Interventions:  - What would you like us to know as we care for you? Wants hemoglobin stabilized  - Provide timely, complete, and accurate information to patient/family  - Incorporate patient and family knowledge, values, beliefs, and cultural backgrounds into the planning and delivery of care  - Encourage patient/family to participate in care and decision-making at the level they choose  - Honor patient and family perspectives and choices  Outcome: Progressing     Problem: Diabetes/Glucose Control  Goal: Glucose maintained within prescribed range  Description: INTERVENTIONS:  - Monitor Blood Glucose as ordered  - Assess for signs and symptoms of hyperglycemia and hypoglycemia  - Administer ordered medications to maintain glucose within target range  - Assess barriers to adequate nutritional intake and initiate nutrition consult as needed  - Instruct patient on self management of diabetes  Outcome: Progressing     Problem: Patient/Family Goals  Goal: Patient/Family Long Term Goal  Description: Patient's Long Term Goal: To get discharge home    Interventions:  - Help to initiate discharge process and communication  - See additional Care Plan goals for specific interventions  Outcome:  Progressing  Goal: Patient/Family Short Term Goal  Description: Patient's Short Term Goal: to get colonoscopy    Interventions:   -Administer schedule prep on time, and prepare patient for colonoscopy  - See additional Care Plan goals for specific interventions  Outcome: Progressing     Problem: PAIN - ADULT  Goal: Verbalizes/displays adequate comfort level or patient's stated pain goal  Description: INTERVENTIONS:  - Encourage pt to monitor pain and request assistance  - Assess pain using appropriate pain scale  - Administer analgesics based on type and severity of pain and evaluate response  - Implement non-pharmacological measures as appropriate and evaluate response  - Consider cultural and social influences on pain and pain management  - Manage/alleviate anxiety  - Utilize distraction and/or relaxation techniques  - Monitor for opioid side effects  - Notify MD/LIP if interventions unsuccessful or patient reports new pain  - Anticipate increased pain with activity and pre-medicate as appropriate  Outcome: Progressing     Problem: RISK FOR INFECTION - ADULT  Goal: Absence of fever/infection during anticipated neutropenic period  Description: INTERVENTIONS  - Monitor WBC  - Administer growth factors as ordered  - Implement neutropenic guidelines  Outcome: Progressing     Problem: SAFETY ADULT - FALL  Goal: Free from fall injury  Description: INTERVENTIONS:  - Assess pt frequently for physical needs  - Identify cognitive and physical deficits and behaviors that affect risk of falls.  - [place] fall precautions as indicated by assessment.  - Educate pt/family on patient safety including physical limitations  - Instruct pt to call for assistance with activity based on assessment  - Modify environment to reduce risk of injury  - Provide assistive devices as appropriate  - Consider OT/PT consult to assist with strengthening/mobility  - Encourage toileting schedule  Outcome: Progressing     Problem: DISCHARGE  PLANNING  Goal: Discharge to home or other facility with appropriate resources  Description: INTERVENTIONS:  - Identify barriers to discharge w/pt and caregiver  - Include patient/family/discharge partner in discharge planning  - Arrange for needed discharge resources and transportation as appropriate  - Identify discharge learning needs (meds, wound care, etc)  - Arrange for interpreters to assist at discharge as needed  - Consider post-discharge preferences of patient/family/discharge partner  - Complete POLST form as appropriate  - Assess patient's ability to be responsible for managing their own health  - Refer to Case Management Department for coordinating discharge planning if the patient needs post-hospital services based on physician/LIP order or complex needs related to functional status, cognitive ability or social support system  Outcome: Progressing     Problem: METABOLIC/FLUID AND ELECTROLYTES - ADULT  Goal: Electrolytes maintained within normal limits  Description: INTERVENTIONS:  - Monitor labs and rhythm and assess patient for signs and symptoms of electrolyte imbalances  - Administer electrolyte replacement as ordered  - Monitor response to electrolyte replacements, including rhythm and repeat lab results as appropriate  - Fluid restriction as ordered  - Instruct patient on fluid and nutrition restrictions as appropriate  Outcome: Progressing     Problem: HEMATOLOGIC - ADULT  Goal: Maintains hematologic stability  Description: INTERVENTIONS  - Assess for signs and symptoms of bleeding or hemorrhage  - Monitor labs and vital signs for trends  - Administer supportive blood products/factors, fluids and medications as ordered and appropriate  - Administer supportive blood products/factors as ordered and appropriate  Outcome: Progressing

## 2024-06-17 NOTE — DISCHARGE INSTRUCTIONS
You are seen in the hospital due to recurrent gastrointestinal bleeding while on blood thinning medication.  You are evaluated by gastroenterology with a second intervention on the known colonic ulcer.  Internal hemorrhoids were also noted.  There was no active bleeding at the time of repeat colonoscopy  - Did require blood transfusion to maintain adequate hemoglobin levels    As we need to preserve the integrity of the stent, we need to maintain aspirin and Plavix for a total of 1 month  - We will then transition to aspirin and Eliquis over 6 months (This will start July 16th as long as there are no complications)  - Wente to monitor for any further bleeding episodes as we need to maintain the blood thinners recommended by cardiology    You did receive hemodialysis as scheduled    Follow-up with primary care clinic in 1-2 weeks

## 2024-06-18 VITALS
SYSTOLIC BLOOD PRESSURE: 127 MMHG | HEART RATE: 82 BPM | BODY MASS INDEX: 26.72 KG/M2 | DIASTOLIC BLOOD PRESSURE: 85 MMHG | RESPIRATION RATE: 18 BRPM | TEMPERATURE: 98 F | WEIGHT: 156.5 LBS | HEIGHT: 64 IN | OXYGEN SATURATION: 100 %

## 2024-06-18 LAB
ALBUMIN SERPL-MCNC: 3.1 G/DL (ref 3.2–4.8)
ANION GAP SERPL CALC-SCNC: 7 MMOL/L (ref 0–18)
BASOPHILS # BLD AUTO: 0.03 X10(3) UL (ref 0–0.2)
BASOPHILS NFR BLD AUTO: 0.4 %
BUN BLD-MCNC: 23 MG/DL (ref 9–23)
BUN/CREAT SERPL: 5.4 (ref 10–20)
CALCIUM BLD-MCNC: 8.4 MG/DL (ref 8.7–10.4)
CHLORIDE SERPL-SCNC: 103 MMOL/L (ref 98–112)
CO2 SERPL-SCNC: 29 MMOL/L (ref 21–32)
CREAT BLD-MCNC: 4.28 MG/DL
DEPRECATED RDW RBC AUTO: 61.7 FL (ref 35.1–46.3)
EGFRCR SERPLBLD CKD-EPI 2021: 14 ML/MIN/1.73M2 (ref 60–?)
EOSINOPHIL # BLD AUTO: 0.18 X10(3) UL (ref 0–0.7)
EOSINOPHIL NFR BLD AUTO: 2.5 %
ERYTHROCYTE [DISTWIDTH] IN BLOOD BY AUTOMATED COUNT: 18.9 % (ref 11–15)
GLUCOSE BLD-MCNC: 130 MG/DL (ref 70–99)
GLUCOSE BLDC GLUCOMTR-MCNC: 149 MG/DL (ref 70–99)
GLUCOSE BLDC GLUCOMTR-MCNC: 177 MG/DL (ref 70–99)
HCT VFR BLD AUTO: 23.3 %
HCT VFR BLD AUTO: 24.3 %
HGB BLD-MCNC: 7.8 G/DL
HGB BLD-MCNC: 7.9 G/DL
IMM GRANULOCYTES # BLD AUTO: 0.04 X10(3) UL (ref 0–1)
IMM GRANULOCYTES NFR BLD: 0.5 %
LYMPHOCYTES # BLD AUTO: 1.05 X10(3) UL (ref 1–4)
LYMPHOCYTES NFR BLD AUTO: 14.4 %
MCH RBC QN AUTO: 31 PG (ref 26–34)
MCHC RBC AUTO-ENTMCNC: 33.5 G/DL (ref 31–37)
MCV RBC AUTO: 92.5 FL
MONOCYTES # BLD AUTO: 0.5 X10(3) UL (ref 0.1–1)
MONOCYTES NFR BLD AUTO: 6.9 %
NEUTROPHILS # BLD AUTO: 5.49 X10 (3) UL (ref 1.5–7.7)
NEUTROPHILS # BLD AUTO: 5.49 X10(3) UL (ref 1.5–7.7)
NEUTROPHILS NFR BLD AUTO: 75.3 %
OSMOLALITY SERPL CALC.SUM OF ELEC: 293 MOSM/KG (ref 275–295)
PHOSPHATE SERPL-MCNC: 4 MG/DL (ref 2.4–5.1)
PLATELET # BLD AUTO: 165 10(3)UL (ref 150–450)
POTASSIUM SERPL-SCNC: 3.5 MMOL/L (ref 3.5–5.1)
RBC # BLD AUTO: 2.52 X10(6)UL
SODIUM SERPL-SCNC: 139 MMOL/L (ref 136–145)
WBC # BLD AUTO: 7.3 X10(3) UL (ref 4–11)

## 2024-06-18 PROCEDURE — 99232 SBSQ HOSP IP/OBS MODERATE 35: CPT | Performed by: INTERNAL MEDICINE

## 2024-06-18 PROCEDURE — 99239 HOSP IP/OBS DSCHRG MGMT >30: CPT | Performed by: INTERNAL MEDICINE

## 2024-06-18 RX ORDER — SIMETHICONE 80 MG
80 TABLET,CHEWABLE ORAL
Qty: 80 TABLET | Refills: 0 | Status: SHIPPED | OUTPATIENT
Start: 2024-06-18

## 2024-06-18 RX ORDER — ASPIRIN 81 MG/1
81 TABLET ORAL DAILY
Qty: 90 TABLET | Refills: 3 | Status: SHIPPED | OUTPATIENT
Start: 2024-06-18

## 2024-06-18 NOTE — CM/SW NOTE
06/18/24 1200   Discharge disposition   Expected discharge disposition Home or Self   Outpatient services Dialysis  ([facility] [place], MWF at 10am)   Discharge transportation Private car     Pt discussed during nursing rounds. Pt is stable for [place] today. MD perry order entered. [facility] will resume outpatient HD services at [place], MWF at 10am. Pt's spouse will provide transport at [place].    Plan: Home w/spouse with [facility] for outpatient HD services today.    / to remain available for support and/or discharge planning.     KYLEE ClarkeN    [phone]

## 2024-06-18 NOTE — OCCUPATIONAL THERAPY NOTE
OCCUPATIONAL THERAPY EVALUATION - INPATIENT     Room Number: 411/411-A  Evaluation Date: 6/18/2024  Type of Evaluation: Initial  Presenting Problem: rectal bleed    Physician Order: IP Consult to Occupational Therapy  Reason for Therapy: ADL/IADL Dysfunction and Discharge Planning    OCCUPATIONAL THERAPY ASSESSMENT   Patient is a 79 year old male admitted 6/13/2024 for rectal bleed s/p colonoscopy.  Prior to admission, patient's baseline is Mod I to Min A with ADLs and ambulation with RW and/or cane.  Patient is currently functioning below baseline with toileting, lower body dressing, transfers, and dynamic standing balance.  Patient is requiring stand-by assist as a result of the following impairments: decreased functional strength and decreased endurance. Pt does not have cont'd acute OT needs. He appears close to his baseline and is SBA for functional transfers/mobility. He requires assist with some ADLs given arthritis and chronic neck/back pain. Wife is able to assist as needed.     PLAN     OT Device Recommendations: Other (Comment) (recommended AE including built up utensils, button hook)    OCCUPATIONAL THERAPY MEDICAL/SOCIAL HISTORY   Problem List  Principal Problem:    Rectal bleeding  Active Problems:    Mixed hyperlipidemia    Pulmonary HTN (HCC)    KRAIG (obstructive sleep apnea)    Type 2 diabetes mellitus with chronic kidney disease on chronic dialysis, with long-term current use of insulin (HCC)    Chronic obstructive asthma (HCC)    Primary hypertension    ESRD (end stage renal disease) on dialysis (HCC)    Anticoagulated    Antiplatelet or antithrombotic long-term use    HOME SITUATION  Type of Home: House  Home Layout: One level  Lives With: Spouse  Shower/Tub and Equipment: Walk-in shower; Tub-shower combo; Grab bar  Other Equipment: -- (rw,cane)  Occupation/Status: retired  Hand Dominance: Right  Drives: Yes    Stairs in Home: 5 MIGUEL, 12 stairs to basement  Use of Assistive Device(s): RW and  serjio    Prior Level of [place]: Pt lives with his wife in a 1 level home. He is Mod I to Min A with ADLs. Pt has difficulty with buttons, dropping utensils, and performing ADLs when he feels more weak. Wife assists as needed. Pt has a walk in shower.     SUBJECTIVE  \"I am the kind of abbi who will do whatever he is able to do \"    OCCUPATIONAL THERAPY EXAMINATION    OBJECTIVE  Precautions: Limb alert - right  Fall Risk: Standard fall risk    PAIN ASSESSMENT  Rating: Unable to rate  Location: chronic pain in back, neck, shoulders,legs  Management Techniques: Activity promotion    ACTIVITY TOLERANCE  Pulse: 82  Heart Rate Source: Monitor                   O2 SATURATIONS  Oxygen Therapy  SPO2% Ambulation on Room Air: 97    COGNITION  Overall Cognitive Status:  WFL - within functional limits    VISION  Current Vision: no visual deficits    PERCEPTION  Overall Perception Status:   WFL - within functional limits      Communication: wfl    Behavioral/Emotional/Social: wfl    RANGE OF MOTION   Upper extremity ROM is within functional limits     STRENGTH ASSESSMENT  Upper extremity strength is within functional limits     COORDINATION  Gross Motor: WFL   Fine Motor: WFL     ACTIVITIES OF DAILY LIVING ASSESSMENT  AM-PAC ‘6-Clicks’ Inpatient Daily Activity Short Form  How much help from another person does the patient currently need…  -   Putting on and taking off regular lower body clothing?: A Little  -   Bathing (including washing, rinsing, drying)?: A Little  -   Toileting, which includes using toilet, bedpan or urinal? : A Little  -   Putting on and taking off regular upper body clothing?: A Little  -   Taking care of personal grooming such as brushing teeth?: None  -   Eating meals?: None    AM-PAC Score:  Score: 20  Approx Degree of Impairment: 38.32%  Standardized Score (AM-PAC Scale): 42.03  CMS Modifier (G-Code): CJ    FUNCTIONAL TRANSFER ASSESSMENT  Sit to Stand: Chair  Chair: Stand-by Assist  Toilet Transfer:  Stand-by Assist    BED MOBILITY     BALANCE ASSESSMENT  Static Sitting: Supervision  Sitting Unilateral: Supervision  Static Standing: Stand-by Assist  Standing Unilateral: Stand-by Assist    FUNCTIONAL ADL ASSESSMENT  Eating: Modified Independent (Pt provided with information for adaptive utensils. Pt with arthritic deformities in L>R hands)  Grooming Seated: Modified Independent  Bathing Seated: Minimal Assist  UB Dressing Seated: Minimal Assist (At baseline, pt requires assistance with buttons due to arthritis. Pt was instructed in use of a button hook.)  LB Dressing Seated: Minimal Assist    THERAPEUTIC EXERCISE     Skilled Therapy Provided: RN approved session. Pt received in the chair, agreeable to tx. Pt reports \"chronic, unrated\" pain in neck, back, shoulders, legs.He has arthritis in his hands and expresses difficulty performing ADLs  such as eating and buttoning. Provided suggestions for adaptive equipment. Pt is SBA for functional transfers and mobility with RW. Hd does not have further acute OT needs. Pt appears close to his baseline. Pt and wife did not have any questions or concerns for potential DC today. Will sign off.     EDUCATION PROVIDED  Patient: Role of Occupational Therapy; Plan of Care; Discharge Recommendations; Functional Transfer Techniques; Compensatory ADL Techniques  Patient's Response to Education: Verbalized Understanding    The patient's Approx Degree of Impairment: 38.32% has been calculated based on documentation in the [facility] '6 clicks' Inpatient Daily Activity Short Form.  Research supports that patients with this level of impairment may benefit from home with HH although this OT recommends home.  Final disposition will be made by interdisciplinary medical team.     Patient End of Session: Up in chair;Needs met;Call light within reach;RN aware of session/findings;All patient questions and concerns addressed;Family present        Patient Evaluation Complexity Level:   Occupational  Profile/Medical History LOW - Brief history including review of medical or therapy records    Specific performance deficits impacting engagement in ADL/IADL LOW  1 - 3 performance deficits    Client Assessment/Performance Deficits LOW - No comorbidities nor modifications of tasks    Clinical Decision Making LOW - Analysis of occupational profile, problem-focused assessments, limited treatment options    Overall Complexity LOW     OT Session   Self-Care Home Management: 5 minutes  Therapeutic Activity: 15 minutes

## 2024-06-18 NOTE — PLAN OF CARE
Monitoring vital signs- stable at this time. Remote tele. No acute changes noted throughout shift. Monitoring blood glucose levels per order. Tolerating diet. Voiding by urinal or up to bathroom. SCDs and ASA, plavix for DVT prophylaxis. Up with standby assist and a walker. Encouraged frequent ambulation and use of incentive spirometer. Fall precautions maintained- bed alarm on, bed locked in lowest position, call light and personal belongings within reach, non-skid socks in place to bilateral feet. Frequent rounding by nursing staff. Plan to discharge home.      Patient cleared by internal medicine, GI, PT/OT, and social work. Going home. IV removed, discharge education provided, patient sent home with all personal belongings, and discharge instructions. Addressed additional questions.        Problem: Patient Centered Care  Goal: Patient preferences are identified and integrated in the patient's plan of care  Description: Interventions:  - What would you like us to know as we care for you? Wants hemoglobin stabilized  - Provide timely, complete, and accurate information to patient/family  - Incorporate patient and family knowledge, values, beliefs, and cultural backgrounds into the planning and delivery of care  - Encourage patient/family to participate in care and decision-making at the level they choose  - Honor patient and family perspectives and choices  Outcome: Progressing     Problem: Diabetes/Glucose Control  Goal: Glucose maintained within prescribed range  Description: INTERVENTIONS:  - Monitor Blood Glucose as ordered  - Assess for signs and symptoms of hyperglycemia and hypoglycemia  - Administer ordered medications to maintain glucose within target range  - Assess barriers to adequate nutritional intake and initiate nutrition consult as needed  - Instruct patient on self management of diabetes  Outcome: Progressing     Problem: Patient/Family Goals  Goal: Patient/Family Long Term Goal  Description:  Patient's Long Term Goal: To get discharge home    Interventions:  - Help to initiate discharge process and communication  - See additional Care Plan goals for specific interventions  Outcome: Progressing  Goal: Patient/Family Short Term Goal  Description: Patient's Short Term Goal: to get colonoscopy    Interventions:   -Administer schedule prep on time, and prepare patient for colonoscopy  - See additional Care Plan goals for specific interventions  Outcome: Progressing     Problem: PAIN - ADULT  Goal: Verbalizes/displays adequate comfort level or patient's stated pain goal  Description: INTERVENTIONS:  - Encourage pt to monitor pain and request assistance  - Assess pain using appropriate pain scale  - Administer analgesics based on type and severity of pain and evaluate response  - Implement non-pharmacological measures as appropriate and evaluate response  - Consider cultural and social influences on pain and pain management  - Manage/alleviate anxiety  - Utilize distraction and/or relaxation techniques  - Monitor for opioid side effects  - Notify MD/LIP if interventions unsuccessful or patient reports new pain  - Anticipate increased pain with activity and pre-medicate as appropriate  Outcome: Progressing     Problem: RISK FOR INFECTION - ADULT  Goal: Absence of fever/infection during anticipated neutropenic period  Description: INTERVENTIONS  - Monitor WBC  - Administer growth factors as ordered  - Implement neutropenic guidelines  Outcome: Progressing     Problem: SAFETY ADULT - FALL  Goal: Free from fall injury  Description: INTERVENTIONS:  - Assess pt frequently for physical needs  - Identify cognitive and physical deficits and behaviors that affect risk of falls.  - [place] fall precautions as indicated by assessment.  - Educate pt/family on patient safety including physical limitations  - Instruct pt to call for assistance with activity based on assessment  - Modify environment to reduce risk of  injury  - Provide assistive devices as appropriate  - Consider OT/PT consult to assist with strengthening/mobility  - Encourage toileting schedule  Outcome: Progressing     Problem: DISCHARGE PLANNING  Goal: Discharge to home or other facility with appropriate resources  Description: INTERVENTIONS:  - Identify barriers to discharge w/pt and caregiver  - Include patient/family/discharge partner in discharge planning  - Arrange for needed discharge resources and transportation as appropriate  - Identify discharge learning needs (meds, wound care, etc)  - Arrange for interpreters to assist at discharge as needed  - Consider post-discharge preferences of patient/family/discharge partner  - Complete POLST form as appropriate  - Assess patient's ability to be responsible for managing their own health  - Refer to Case Management Department for coordinating discharge planning if the patient needs post-hospital services based on physician/LIP order or complex needs related to functional status, cognitive ability or social support system  Outcome: Progressing     Problem: METABOLIC/FLUID AND ELECTROLYTES - ADULT  Goal: Electrolytes maintained within normal limits  Description: INTERVENTIONS:  - Monitor labs and rhythm and assess patient for signs and symptoms of electrolyte imbalances  - Administer electrolyte replacement as ordered  - Monitor response to electrolyte replacements, including rhythm and repeat lab results as appropriate  - Fluid restriction as ordered  - Instruct patient on fluid and nutrition restrictions as appropriate  Outcome: Progressing     Problem: HEMATOLOGIC - ADULT  Goal: Maintains hematologic stability  Description: INTERVENTIONS  - Assess for signs and symptoms of bleeding or hemorrhage  - Monitor labs and vital signs for trends  - Administer supportive blood products/factors, fluids and medications as ordered and appropriate  - Administer supportive blood products/factors as ordered and  appropriate  Outcome: Progressing

## 2024-06-18 NOTE — PHYSICAL THERAPY NOTE
PHYSICAL THERAPY TREATMENT NOTE - INPATIENT     Room Number: 411/411-A       Presenting Problem: rectal bleeding  Co-Morbidities : CAD, afib, HFpEF, ESRD on HD    Problem List  Principal Problem:    Rectal bleeding  Active Problems:    Mixed hyperlipidemia    Pulmonary HTN (HCC)    KRAIG (obstructive sleep apnea)    Type 2 diabetes mellitus with chronic kidney disease on chronic dialysis, with long-term current use of insulin (HCC)    Chronic obstructive asthma (HCC)    Primary hypertension    ESRD (end stage renal disease) on dialysis ([facility])    Anticoagulated    Antiplatelet or antithrombotic long-term use      PHYSICAL THERAPY ASSESSMENT   Patient demonstrates good  progress this session, goals  remain in progress.    Patient continues to function below baseline with bed mobility, transfers, and gait.  Contributing factors to remaining limitations include decreased functional strength, decreased endurance/aerobic capacity, and pain.  Next session anticipate patient to progress bed mobility, transfers, and gait.  Physical Therapy will continue to follow patient for duration of hospitalization.    Patient continues to benefit from continued skilled PT services: at discharge to promote functional independence in home.  Anticipate patient will return home with home health PT.    PLAN  PT Treatment Plan: Bed mobility  Frequency (Obs): 5x/week    SUBJECTIVE  Pt reports being ready for PT RX    OBJECTIVE  Precautions: Limb alert - right    WEIGHT BEARING RESTRICTION                PAIN ASSESSMENT   Ratin  Location: \"all over\" jayshree hands, feet, shoulder, neck (chronic pain)  Management Techniques: Activity promotion    BALANCE  Static Sitting: Good  Dynamic Sitting: Fair +  Static Standing: Poor -  Dynamic Standing: Fair -    ACTIVITY TOLERANCE                          O2 WALK       AM-PAC '6-Clicks' INPATIENT SHORT FORM - BASIC MOBILITY  How much difficulty does the patient currently have...  Patient Difficulty: Turning  over in bed (including adjusting bedclothes, sheets and blankets)?: A Little   Patient Difficulty: Sitting down on and standing up from a chair with arms (e.g., wheelchair, bedside commode, etc.): A Little   Patient Difficulty: Moving from lying on back to sitting on the side of the bed?: A Little   How much help from another person does the patient currently need...   Help from Another: Moving to and from a bed to a chair (including a wheelchair)?: A Little   Help from Another: Need to walk in hospital room?: A Little   Help from Another: Climbing 3-5 steps with a railing?: A Little     AM-PAC Score:  Raw Score: 18   Approx Degree of Impairment: 46.58%   Standardized Score (AM-PAC Scale): 43.63   CMS Modifier (G-Code): CK    FUNCTIONAL ABILITY STATUS  Functional Mobility/Gait Assessment  Gait Assistance: Contact guard assist  Distance (ft): 2 x 100  Assistive Device: Rolling walker  Pattern: Shuffle  Stairs: Stairs  How Many Stairs: 12  Device: 2 Rails  Assist: Contact guard assist  Pattern: Ascend and Descend  Rolling: minimal assist  Supine to Sit: minimal assist  Sit to Supine: minimal assist  Sit to Stand: minimal assist    Additional information: Pt seen daily.Min a for bed mobility and transfer.EOB sitting balance activity with emphasis on core stabilization.Family present;family education;all questions and concerns addressed.Pt amb 2 x 100 ft with RW and CGA.Navigated 12 stairswith CGAThere ex.    The patient's Approx Degree of Impairment: 46.58% has been calculated based on documentation in the [facility] '6 clicks' Inpatient Daily Activity Short Form.  Research supports that patients with this level of impairment may benefit from Home with Home Health PT.  Final disposition will be made by interdisciplinary medical team.    THERAPEUTIC EXERCISES  Lower Extremity Alternating marching  Ankle pumps  Glut sets     Position Supine       Patient End of Session: Up in chair;Call light within reach;RN aware of  session/findings;All patient questions and concerns addressed    CURRENT GOALS     Patient Goal Patient's self-stated goal is: return home   Goal #1 Patient is able to demonstrate supine - sit EOB @ level: modified independent     Goal #1   Current Status Min a   Goal #2 Patient is able to demonstrate transfers Sit to/from Stand at assistance level: modified independent with walker - rolling     Goal #2  Current Status Min a   Goal #3 Patient is able to ambulate 300 feet with assist device: walker - rolling at assistance level: supervision   Goal #3   Current Status Pt amb 2 x 100 ft with RW and CGA   Goal #4 Patient will negotiate 5 stairs with rail and supervision   Goal #4   Current Status Navigated 12 stairs with CGA   Goal #5 Patient to demonstrate independence with home activity/exercise instructions provided to patient in preparation for discharge.   Goal #5   Current Status In prsent   Goal #6    Goal #6  Current Status      Gait Training: 15 minutes  Therapeutic Activity: 15 minutes  Neuromuscular Re-education:  minutes  Therapeutic Exercise:  minutes  [place] Repositioning:  minutes  Manual Therapy:  minutes  Can add/delete any of these

## 2024-06-18 NOTE — PLAN OF CARE
Alert and oriented x4 on room air. No acute changes overnight. Up standby with walker. No requests for pain meds. Call light within reach. Plan for home pending medical clearance.    Problem: Patient Centered Care  Goal: Patient preferences are identified and integrated in the patient's plan of care  Description: Interventions:  - What would you like us to know as we care for you? Wants hemoglobin stabilized  - Provide timely, complete, and accurate information to patient/family  - Incorporate patient and family knowledge, values, beliefs, and cultural backgrounds into the planning and delivery of care  - Encourage patient/family to participate in care and decision-making at the level they choose  - Honor patient and family perspectives and choices  Outcome: Progressing     Problem: Diabetes/Glucose Control  Goal: Glucose maintained within prescribed range  Description: INTERVENTIONS:  - Monitor Blood Glucose as ordered  - Assess for signs and symptoms of hyperglycemia and hypoglycemia  - Administer ordered medications to maintain glucose within target range  - Assess barriers to adequate nutritional intake and initiate nutrition consult as needed  - Instruct patient on self management of diabetes  Outcome: Progressing     Problem: Patient/Family Goals  Goal: Patient/Family Long Term Goal  Description: Patient's Long Term Goal: To get discharge home    Interventions:  - Help to initiate discharge process and communication  - See additional Care Plan goals for specific interventions  Outcome: Progressing  Goal: Patient/Family Short Term Goal  Description: Patient's Short Term Goal: to get colonoscopy    Interventions:   -Administer schedule prep on time, and prepare patient for colonoscopy  - See additional Care Plan goals for specific interventions  Outcome: Progressing     Problem: PAIN - ADULT  Goal: Verbalizes/displays adequate comfort level or patient's stated pain goal  Description: INTERVENTIONS:  - Encourage  pt to monitor pain and request assistance  - Assess pain using appropriate pain scale  - Administer analgesics based on type and severity of pain and evaluate response  - Implement non-pharmacological measures as appropriate and evaluate response  - Consider cultural and social influences on pain and pain management  - Manage/alleviate anxiety  - Utilize distraction and/or relaxation techniques  - Monitor for opioid side effects  - Notify MD/LIP if interventions unsuccessful or patient reports new pain  - Anticipate increased pain with activity and pre-medicate as appropriate  Outcome: Progressing     Problem: RISK FOR INFECTION - ADULT  Goal: Absence of fever/infection during anticipated neutropenic period  Description: INTERVENTIONS  - Monitor WBC  - Administer growth factors as ordered  - Implement neutropenic guidelines  Outcome: Progressing     Problem: SAFETY ADULT - FALL  Goal: Free from fall injury  Description: INTERVENTIONS:  - Assess pt frequently for physical needs  - Identify cognitive and physical deficits and behaviors that affect risk of falls.  - [place] fall precautions as indicated by assessment.  - Educate pt/family on patient safety including physical limitations  - Instruct pt to call for assistance with activity based on assessment  - Modify environment to reduce risk of injury  - Provide assistive devices as appropriate  - Consider OT/PT consult to assist with strengthening/mobility  - Encourage toileting schedule  Outcome: Progressing     Problem: DISCHARGE PLANNING  Goal: Discharge to home or other facility with appropriate resources  Description: INTERVENTIONS:  - Identify barriers to discharge w/pt and caregiver  - Include patient/family/discharge partner in discharge planning  - Arrange for needed discharge resources and transportation as appropriate  - Identify discharge learning needs (meds, wound care, etc)  - Arrange for interpreters to assist at discharge as needed  - Consider  post-discharge preferences of patient/family/discharge partner  - Complete POLST form as appropriate  - Assess patient's ability to be responsible for managing their own health  - Refer to Case Management Department for coordinating discharge planning if the patient needs post-hospital services based on physician/LIP order or complex needs related to functional status, cognitive ability or social support system  Outcome: Progressing     Problem: METABOLIC/FLUID AND ELECTROLYTES - ADULT  Goal: Electrolytes maintained within normal limits  Description: INTERVENTIONS:  - Monitor labs and rhythm and assess patient for signs and symptoms of electrolyte imbalances  - Administer electrolyte replacement as ordered  - Monitor response to electrolyte replacements, including rhythm and repeat lab results as appropriate  - Fluid restriction as ordered  - Instruct patient on fluid and nutrition restrictions as appropriate  Outcome: Progressing     Problem: HEMATOLOGIC - ADULT  Goal: Maintains hematologic stability  Description: INTERVENTIONS  - Assess for signs and symptoms of bleeding or hemorrhage  - Monitor labs and vital signs for trends  - Administer supportive blood products/factors, fluids and medications as ordered and appropriate  - Administer supportive blood products/factors as ordered and appropriate  Outcome: Progressing

## 2024-06-18 NOTE — SLP NOTE
SPEECH DAILY NOTE - INPATIENT    ASSESSMENT & PLAN   ASSESSMENT  PPE REQUIRED. THIS THERAPIST WORE GLOVES AND DROPLET MASK FOR DURATION OF TX SESSION. HANDS WASHED UPON ENTRANCE/EXIT.     SLP f/u for ongoing meal assessment per recommendations of clear liquid diet.  MD advanced pt's diet to general solids and thin liquids. Chart reviewed and collaborated with RN.  RN reports pt tolerates diet and medication well with no overt clinical signs of aspiration. Pt seen at bedside with family present.  Pt denies any pain. Pt is alert and agreeable to participate in swallowing therapy.  Pt denies any swallowing challenges.     Pt was positioned upright to 90 degrees in bedside chair. Pt afebrile, tolerating room air with oxygen status 100% prior to the start of oral trials. The pt reports preferred method of education is verbal explanation.  SLP verbally reviewed aspiration precautions, recommended diet, and safe swallowing compensatory strategies with the patient. Patient acknowledged understanding and demonstrated independence with swallowing precautions while self feeding po trials.  Good tolerance on general solids and thin liquids via open cup with adequate oral acceptance and no bilabial spillage. Oropharyngeal transit phases appear timely.  Coordinated mastication on hard solids was complete.  Functional hyolaryngeal elevation/excursion to palpation during the swallow with no overt clinical signs/symptoms of aspiration (e.g., immediate/delayed throat clear, immediate/delayed cough, wet vocal quality, increased O2 effort) observed across all trials.  Significant improvement in swallowing status from initial BSSE. Oxygen status remained at baseline throughout the entire session. Oral/buccal cavities clear of residue. Trial of thin liquids via straw with timely oropharyngeal transits with no overt clinical signs of aspiration.  Pt may use a straw. Recommend to continue current diet of general solids and thin liquids.        PLAN: SLP to sign off case secondary to pt tolerating least restrictive diet and demonstrating independent use of swallowing precautions.  Swallowing precautions and diet recommendations remain written on white board in the pt's room.  RN alerted with results and recommendations.      Diet Recommendations - Solids: Regular  Diet Recommendations - Liquids: Thin Liquids    Compensatory Strategies Recommended: Small bites and sips  Aspiration Precautions: Upright position;Slow rate;Small bites and sips  Medication Administration Recommendations: One pill at a time    Patient Experiencing Pain: No                Treatment Plan  Treatment Plan/Recommendations: Aspiration precautions    Interdisciplinary Communication: Discussed with RN  Plan posted at bedside            FOLLOW UP  Follow Up Needed (Documentation Required): No    Number of Visits to Meet Established Goals:  0    Session: 1 post BSSE    If you have any questions, please contact ARTEMIO Rios MS/CCC-SLP  Speech Language Pathologist  [facility]  EXT. [phone]

## 2024-06-18 NOTE — PROGRESS NOTES
[facility]  part of [facility]    Progress Note    Ollie Hernández Patient Status:  Inpatient    1945 MRN [id number]   Location [facility] 4W/SW/SE Attending Wili Parmar MD   Hosp Day # 5 PCP Wili Parmar MD       SUBJECTIVE:  Feeling well, abdominal distention improving.  Did have a bowel movement that he reports was yellow, a little pink-tinged.  Was able to ambulate throughout the day yesterday.    OBJECTIVE:  /56 (BP Location: Right arm)   Pulse 73   Temp 98.3 °F (36.8 °C) (Temporal)   Resp 18   Ht 5' 4\" (1.626 m)   Wt 156 lb 8 oz (71 kg)   SpO2 94%   BMI 26.86 kg/m²     Intake/Output:  I/O last 3 completed shifts:  In: 540 [P.O.:540]  Out: 6000 [Other:6000]    Wt Readings from Last 3 Encounters:   24 156 lb 8 oz (71 kg)   24 150 lb 4.8 oz (68.2 kg)   24 163 lb 9.6 oz (74.2 kg)       Exam  Gen: No acute distress, alert and oriented x3; examined while starting dialysis  Pulm: Lungs CTAB, no rhonchi or wheezing  CV: Heart IRR  Abd: Abdomen soft, mildly distended-improved from yesterday  MSK: +RLE edema; no LLE edema  Skin: no rashes or lesions  Neuro: A&O x 3    Labs:   Recent Labs   Lab 24  0454 24  1511 24  0359   RBC 2.40* 3.01* 2.52*   HGB 7.1* 8.9* 7.8*   HCT 22.1* 28.1* 23.3*   MCV 92.1 93.4 92.5   MCH 29.6 29.6 31.0   MCHC 32.1 31.7 33.5   RDW 18.7* 18.8* 18.9*   NEPRELIM 5.95 6.99 5.49   WBC 7.8 8.6 7.3   .0 197.0 165.0         Recent Labs   Lab 24  0849 24  0340 06/15/24  0507 24  0436 24  0400 24  0359   *   < > 137*  137* 106* 138* 130*   BUN 43*   < > 28*  28* 30* 39* 23   CREATSERUM 4.74*   < > 3.99*  3.99* 5.07* 6.06* 4.28*   EGFRCR 12*   < > 15*  15* 11* 9* 14*   CA 9.5   < > 9.0  9.0 9.0 8.5* 8.4*   ALB 3.8   < > 3.4  3.4 3.0* 3.1* 3.1*      < > 142  142 139 135* 139   K 4.0   < > 3.7  3.7 3.2* 3.3* 3.5      < > 103  103 101 96* 103   CO2 32.0    < > 33.0*  33.0* 31.0 30.0 29.0   ALKPHO 57  --  47 43*  --   --    AST 25  --  21 16  --   --    ALT 27  --  16 12  --   --    BILT 0.3  --  0.6 0.5  --   --    TP 6.7  --  5.8 5.2*  --   --     < > = values in this interval not displayed.         Imaging:  US VENOUS DOPPLER LEG RIGHT - DIAG IMG (CPT=93971)    Result Date: 6/16/2024  CONCLUSION: No evidence of right lower extremity DVT.    Dictated by (CST): Oseas Good MD on 6/16/2024 at 10:30 AM     Finalized by (CST): Oseas Good MD on 6/16/2024 at 10:31 AM                 Assessment and Plan:       Rectal bleeding   Acute on chronic anemia  -pt with hx of constipation/hemorrhoids/rectal bleeding in the past (saw GI Dr. Gautam for this in 10/2020)  -last colonoscopy 1/25/21 (Dr. Gautam) -- internal hemorrhoids, colon polyps x 2 (one tubular adenoma)  Recent egd/colonoscopy for GI bleeding 6/1/2024 demonstrating hepatic flexure AVM, which was cauterized  -eliquis on hold  -s/p 1prbc 6/14/24; Hgb 6.7>7.9>7.3>6.4; receiving 1u PRBC 6/16 -> 7.4 -> 7.1 > 8.9 > 7.8  -multifactorial possibly from hemoconcentration from dialysis yesterday (all cell lines down), may be equilibrating  - Plan to repeat H&H later this morning  -appreciate GI consult, started on IV Venofer x 1 dose 6/16  -colonoscopy 6/15/2024: no active bleeding; ascending colon ulcer with single clip-clean based with small red spot, exacerbated by irrigation and second clip placed; small internal hemorrhoids-no bleeding, small 1-2mm splenic flexure polyp (not removed d/t high risk bleeding)  - Plan to repeat 1 PM CBC after dialysis completed    RLE edema  Check venous doppler  - No evidence of RLE DVT ( off eliquis)    Abdominal gaseous distension  Gasx TID and HS     CAD  -LHC 5/21/2024 by Dr. Luis Orozco: 70% lesion of apical LAD, 80% lesion of LCx  -S/p PCI of LCx (medical management of the LAD lesion)  -s/p aspirin, plavix and eliquis x 1 week, now just plavix and eliquis  -cardiology  consulted--resume aspirin and plavix; plan to hold eliquis until completing 1 month DAPT     Pharyngeal dysphagia  --right side hypomobile oropharyngeal dysphagia  - swallow study 4/2024: intermittent shallow and deep laryngeal penetration without aspiration, small zenker's diverticulum, bulky endplate osteophytes contributing to dysphagia  - to see Dr. Del Angel (ENT at [place]) for further imaging     Paroxysmal A-fib  --dx'ed 9/2023  --Zio monitor 10/2023 -- NSVT (normal EF 65%); on carvedilol  - followed by EP Dr. Phelan  --Eliquis on hold in favor of DAPT     Cervical radiculopathy  Chronic back pain with neuropathy  -CT scan of the cervical neck 7/2019 ; history of cervical decompressive laminectomy with multilevel degenerative disc disease  - s/p physical therapy in the past  --MRI brain and MRI cervical spine done 12/29/22 (MRI brain w/mod chronic microvascular ischemic changes bilat cerebral hemispheres, basal ganglia and thalami; MRI cervical spine w/multilevel spinal stenosis)  --saw NS PA Dr. Araujo in 1/2023; had subsequent MRI thoracic/lumbar spine.  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  Pt was referred for PT in 2/2023.      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 Sevilla  --HgbA1c 5.5 in 9/2023  - Gabapentin for neuropathy  --at home: on Tradjenta 5mg daily (no longer on insulin)  -- SSI in hospital  ---180s     ESRD on hemodialysis  - 2/2 prolonged history of diabetes and hypertension  - Follows with Dr. Martinez  -HD per Dr. Callahan, 3 L removed yesterday     Hypertension  - carvedilol 25 mg BID, hydralazine 25mg q8h, felodipine 10mg/day (on amlodipine in hosp due to formulary)     Hyperlipidemia  - Continue with On atorvastatin 40 mg daily, aspirin     BPH  -no longer takes trospium  or tamsulosin     Pulmonary hypertension  -Comanagement of KRAIG and chronic diastolic heart failure  - stable     KRAIG on CPAP  - sees pulm Dr. Landeros     Cataracts  -Seems to be stable, follows with Dr. Chase of ophthalmology     Gout  Flareup in 6/2022. NO recurrence  -Seems to be stable     Anemia of ESRD  -Baseline hemoglobin seems to be around 10-11  -managed by nephrology; on aranesp  -Hgb decreased as above  - Started on IV Venofer as above     Sensorineural hearing loss  Mild-moderate per audiology testing/14/2022.  He may be a hearing aid candidate in the future     Unsteady Gait  Ongoing fatigue due to multiple comorbidities as above  - uses a walker, 2 wheeled-2 constipation.  Does not want a rollator walker at this time  - He is a fall risk with his unsteady gait, fatigue.     Anxiety  Lexapro 5 mg once a day.          VTE PPx: SCDs, holding eliquis    Dispo: home with HH when approrpiate, possibly home if okay per NATAN Parmar MD, 06/18/24, 8:05 AM

## 2024-06-18 NOTE — PROGRESS NOTES
[facility]  part of [facility]    Progress Note    Ollie Hernández Patient Status:  Inpatient    1945 MRN [id number]   Location [facility] 4W/SW/SE Attending No att. providers found   Hosp Day # 5 PCP Wili Parmar MD       Subjective:   Ollie Hernández is a(n) 79 year old male     ROS:     Constitutional:  Negative for decreased activity, fever, irritability and lethargy  ENMT:  Negative for ear drainage, hearing loss and nasal drainage  Eyes:  Negative for eye discharge and vision loss  Cardiovascular:  Negative for chest pain, sob, irregular heartbeat/palpitations  Respiratory:  Negative for cough, dyspnea and wheezing  Gastrointestinal:  Negative for abdominal pain, constipation, decreased appetite, diarrhea and vomiting  Genitourinary:  Negative for dysuria and hematuria  Endocrine:  Negative for abnormal sleep patterns, increased activity, polydipsia and polyphagia  Hema/Lymph:  Negative for easy bleeding and easy bruising  Integumentary:  Negative for pruritus and rash  Musculoskeletal:  Negative for bone/joint symptoms  Neurological:  Negative for gait disturbance  Psychiatric:  Negative for inappropriate interaction and psychiatric symptoms      Vitals:    24 1304   BP:    Pulse: 82   Resp:    Temp:            PHYSICAL EXAM:   Constitutional: appears well hydrated alert and responsive no acute distress noted  Head/Face: normocephalic  Eyes/Vision: normal extraocular motion is intact  Nose/Mouth/Throat:mucous membranes are moist and no oral lesions are noted  Neck/Thyroid: neck is supple without adenopathy  Lymphatic: no abnormal cervical, supraclavicular adenopathy is noted  Respiratory:  lungs are clear to auscultation bilaterally, normal respiratory effort  Cardiovascular: regular rate and rhythm no murmurs, gallups, or rubs  Abdomen: soft, non-tender, non-distended, BS normal  Vascular: well perfused femoral, and pedal pulses normal  Skin/Hair: no unusual  rashes present, no abnormal bruising noted  Back/Spine: no abnormalities noted  Musculoskeletal: full ROM all extremities good strength  no deformities  Extremities: no edema, cyanosis  Neurological:  Grossly normal    Results:     Laboratory Data:  Lab Results   Component Value Date    WBC 7.3 06/18/2024    HGB 7.9 (L) 06/18/2024    HCT 24.3 (L) 06/18/2024    .0 06/18/2024    CREATSERUM 4.28 (H) 06/18/2024    BUN 23 06/18/2024     06/18/2024    K 3.5 06/18/2024     06/18/2024    CO2 29.0 06/18/2024     (H) 06/18/2024    CA 8.4 (L) 06/18/2024    ALB 3.1 (L) 06/18/2024    ALKPHO 43 (L) 06/16/2024    BILT 0.5 06/16/2024    TP 5.2 (L) 06/16/2024    AST 16 06/16/2024    ALT 12 06/16/2024    T4F 0.9 08/31/2022    TSH 2.060 06/23/2023     (H) 07/25/2023    PSA 2.94 10/20/2021    ESRML 10 06/16/2024    CRP 1.30 (H) 06/16/2024    MG 2.1 11/20/2023    PHOS 4.0 06/18/2024    TROP <0.045 07/25/2019     08/05/2023    B12 1,237 (H) 06/16/2024       [facility]:  [facility]   No results found.      ASSESSMENT/PLAN:   Assessment       1 GI bleed stable no active bleeding hemoglobin stable iron okay will get Epogen at dialysis    #2 ESRD follow-up tomorrow with dialysis   #3 A-fib  Home with aspirin and Eliquis to transition over to possibly Eliquis as an outpatient    6/18/2024  José Callahan MD

## 2024-06-18 NOTE — PROGRESS NOTES
Cardiology Progress Note  [facility]    Ollie Hernández Patient Status:  Emergency    1945 MRN [id number]   Location [facility] EMERGENCY DEPARTMENT Attending Antonio Nunes MD   Hosp Day # 5 PCP Wili Parmar MD     Reason for Consultation:  Bleeding    History of Present Illness:  Ollie Hernández is a a(n) 79 year old male with coronary artery disease and PCI to circumflex (2024), paroxysmal atrial fibrillation on Eliquis, chronic HFpEF, ESRD on hemodialysis who presents with recurrent rectal bleeding.    His PCI was on 2024.  He was discharged on 1 week of triple therapy with aspirin 81 mg, Plavix, Eliquis.  Patient then stopped aspirin as planned.  He presented to [facility] on 2024 with rectal bleeding for 3 days while on dual antithrombotic therapy with Plavix and Eliquis.  Anticoagulation was held while patient underwent endoscopy, and he had APC of bleeding lesion and removal of a sessile polyp.    Placed back on Plavix plus Eliquis (restarted 24) and discharged.  He felt well without any bleeding until this morning at 7 AM, when he had a large bloody bowel movement with bright red blood.    Subjective:  Patient had bloody stool during dialysis with drop in Hgb to 6.7. He received 1U pRBCs.    Assessment/Plan:    ABLA Hgb 11.5 (24) -> 9.8   - Reviewed records: Hgb appears somewhat variable but baseline appears to be 10-11.  - Underwent colonoscopy s/p Clip of bleeding lesion and removal of sessile polyp.  CAD - PCI LCX with [place] ABIMBOLA x 1 (24)  - Restarted Eliquis + plavix on 24  - Eliquis stopped on 24 (last dose by patient 24 pm).  PAF on Eliquis  NSVT on Zio patch  Chronic HFpEF - compensated  ESRD on HD MWF  DM2  HTN  KRAIG on CPAP  LVEF 60% (echo )    Plan  Risk of life threatening stent thrombosis outweighs risk of life threatening hemorrhage at present.   Restarted DAPT given above given risk of instent thrombosis in  this time period. Bleeding may be residual effects of Eliquis.  Repeat Hgb and transfuse for < 8. 1 uPRBC  6/16  Can do DAPT x 1 month as he received a Ramin [place] ABIMBOLA (FDA approved for shorter course of DAPT).  Can transition to ASA + Eliquis after 1 month of DAPT for a total duration of 6 months.  Hypertension continue home meds.controlled     6/16/2024  No angina no sign of heart failure  DAPT has been resumed  Tolerated GI procedure  Transfusion today  Continue to hold Eliquis    6/17/24  week tired  3L fluid off today   No CP no SOB   Denies any bleeding     6/18/24  NO CP no SOB   Ambulating      History:  Past Medical History:    Anemia    Asthma (HCC)    Back problem    BPH (benign prostatic hyperplasia)    Calculus of kidney    Cataract    Diabetes (HCC)    ESRD (end stage renal disease) on dialysis (HCC)    Essential hypertension    High blood pressure    High cholesterol    History of blood transfusion    Hyperlipidemia    Neuropathy    hands and feet    KRAIG on CPAP    Renal disorder    Sleep apnea    Visual impairment    glasses    Vocal cord paralysis, unilateral partial     Past Surgical History:   Procedure Laterality Date    Appendectomy      1981    Back surgery      Neck/back - 1998    Capsule endoscopy - internal referral      Cataract      12/2021 and 1/2022    Colonoscopy      Colonoscopy N/A 1/25/2021    Procedure: COLONOSCOPY;  Surgeon: Michael Gautam MD;  Location: [facility] ENDO    Colonoscopy N/A 6/3/2024    Procedure: COLONOSCOPY;  Surgeon: Gabriel Saldana MD;  Location: [facility] ENDOSCOPY    Colonoscopy N/A 6/15/2024    Procedure: COLONOSCOPY;  Surgeon: Carlyn Storey MD;  Location: [facility] ENDOSCOPY    Hand/finger surgery unlisted      Accidental trauma    Upper gi endoscopy,diagnosis       Family History   Problem Relation Age of Onset    Cancer Father         Kidney    Breast Cancer Mother     Diabetes Mother     Diabetes Maternal Grandmother     Diabetes Maternal Grandfather     Heart Disorder  Other         Uncle      reports that he quit smoking about 43 years ago. His smoking use included cigarettes. He started smoking about 60 years ago. He has a 17 pack-year smoking history. He has never used smokeless tobacco. He reports that he does not drink alcohol and does not use drugs.    Allergies:  Allergies   Allergen Reactions    Adhesive Tape OTHER (SEE COMMENTS)     Severe rashes    Dust Mite Extract RASH       Medications:  Current Facility-Administered Medications on File Prior to Encounter   Medication Dose Route Frequency Provider Last Rate Last Admin    [COMPLETED] iron sucrose (Venofer) 300 mg in sodium chloride 0.9% 250 mL IVPB  300 mg Intravenous Daily José Callahan .7 mL/hr at 24 1133 300 mg at 24 1133    [COMPLETED] polyethylene glycol-electrolyte (Golytely) 236 g oral solution 2,000 mL  2,000 mL Oral Once Emilio Talley MD   2,000 mL at 24 0456    [COMPLETED] polyethylene glycol-electrolyte (Golytely) 236 g oral solution 2,000 mL  2,000 mL Oral Once Emilio Talley MD   2,000 mL at 24 1800    [] sodium chloride 0.9 % IV bolus 100 mL  100 mL Intravenous Q30 Min PRN Walker Klein MD        And    [] albumin human (Albumin) 25% injection 25 g  25 g Intravenous PRN Dialysis Walker Klein MD        [COMPLETED] acetaminophen (Tylenol Extra Strength) tab 1,000 mg  1,000 mg Oral Once Devaughn Montiel, DO   1,000 mg at 24 0345    [COMPLETED] sodium chloride 0.9% infusion   Intravenous On Call Luis Orozco MD   Stopped at 24 0000    [COMPLETED] lidocaine PF (Xylocaine-MPF) 2 % injection             [COMPLETED] heparin in sodium chloride 0.9% (Porcine) 2 Units/mL flush bag premix             [COMPLETED] heparin in sodium chloride 0.9% (Porcine) 2 Units/mL flush bag premix             [COMPLETED] fentaNYL (Sublimaze) 50 mcg/mL injection             [COMPLETED] midazolam (Versed) 2 MG/2ML injection             [COMPLETED] heparin  (Porcine) 1000 UNIT/ML injection             [COMPLETED] Nitroglycerin in D5W 200-5 MCG/ML-% injection             [COMPLETED] midazolam (Versed) 2 MG/2ML injection             [COMPLETED] iohexol (Omnipaque) 300 MG/ML injection 350 mL  350 mL Intravenous ONCE PRN Luis Orozco MD   230 mL at 24 1601    [] sodium chloride 0.9% infusion   Intravenous Continuous Luis Orozco MD   Stopped at 24 0000     Current Outpatient Medications on File Prior to Encounter   Medication Sig Dispense Refill    linaGLIPtin (TRADJENTA) 5 mg Oral Tab Take 1 tablet (5 mg total) by mouth daily. 90 tablet 1    hydrALAZINE 25 MG Oral Tab Take 1 tablet (25 mg total) by mouth every 8 (eight) hours. 90 tablet 1    clopidogrel 75 MG Oral Tab Take 1 tablet (75 mg total) by mouth daily. 90 tablet 1    escitalopram 5 MG Oral Tab Take 1 tablet (5 mg total) by mouth daily. 90 tablet 3    carvedilol 25 MG Oral Tab Take 1 tablet (25 mg total) by mouth 2 (two) times daily.      atorvastatin 40 MG Oral Tab Take 1 tablet (40 mg total) by mouth nightly. 90 tablet 3    apixaban 5 MG Oral Tab Take 1 tablet (5 mg total) by mouth 2 (two) times daily.      acetaminophen 500 MG Oral Tab Take 1 tablet (500 mg total) by mouth daily as needed for Pain.      cetirizine 10 MG Oral Tab Take 1 tablet (10 mg total) by mouth every other day.      Cholecalciferol 125 MCG (5000 UT) Oral Tab Take 1 tablet (5,000 Units total) by mouth 2 (two) times daily.      tetrahydrozoline 0.05 % Ophthalmic Solution 1 drop 2 (two) times daily as needed.      felodipine ER 10 MG Oral Tablet 24 Hr Take 1 tablet (10 mg total) by mouth daily. 90 tablet 3    fluticasone propionate 50 MCG/ACT Nasal Suspension 2 sprays by Nasal route daily. 48 g 3    pantoprazole 40 MG Oral Tab EC Take 1 tablet (40 mg total) by mouth every morning before breakfast. 90 tablet 3    albuterol (PROAIR HFA) 108 (90 Base) MCG/ACT Inhalation Aero Soln Inhale 2 puffs into the lungs every 4 (four)  hours as needed for Wheezing. 3 each 3    Budesonide-Formoterol Fumarate (SYMBICORT) 160-4.5 MCG/ACT Inhalation Aerosol Inhale 2 puffs into the lungs 2 (two) times daily. (Patient taking differently: Inhale 2 puffs into the lungs 2 (two) times daily as needed.) 3 each 3    Darbepoetin Randell 60 MCG/ML Injection Solution Inject into the vein as needed.      polyethylene glycol, PEG 3350, 17 g Oral Powd Pack 17 g Wed, Thurs, Sat      traMADol 50 MG Oral Tab Take 1 tablet (50 mg total) by mouth every 12 (twelve) hours as needed for Pain.      Glucose Blood (CONTOUR NEXT TEST) In Vitro Strip Test 3 times daily 300 each 1    methocarbamol 500 MG Oral Tab Take 1 tablet (500 mg total) by mouth 2 (two) times daily as needed. (Patient not taking: Reported on 6/13/2024) 60 tablet 1    Insulin Pen Needle (PEN NEEDLES) 32G X 4 MM Does not apply Misc 1 each daily. 100 each 0       Review of Systems:  Constitutional: denies fevers, chills, night sweats  HEENT: denies headache, vision changes, trouble or pain with swallowing  Cardiac: denies chest pain, palpitations, edema  Pulm: denies dyspnea, cough, wheeze  GI: denies n/v, abd pain, diarrhea or constipation  : denies hematuria, dysuria, incontinence  MSK: denies muscle or joint pains  Neuro: denies numbness, weakness, paresthesias  Psych: denies anxiety, depression  Integument: denies skin rashes or lesions  Heme: denies easy bruising or bleeding  Endo: denies heat/cold intolerance, skin or nail changes      Physical Exam:  Blood pressure 134/56, pulse 73, temperature 98.3 °F (36.8 °C), temperature source Temporal, resp. rate 18, height 162.6 cm (5' 4\"), weight 156 lb 8 oz (71 kg), SpO2 94%.  Wt Readings from Last 3 Encounters:   06/13/24 156 lb 8 oz (71 kg)   06/05/24 150 lb 4.8 oz (68.2 kg)   05/28/24 163 lb 9.6 oz (74.2 kg)       General: awake, alert, oriented x 3, no acute distress  HEENT: at/nc, perrl, eomi  Neck: No JVD, carotids 2+ no bruits.  Cardiac: Regular rate and  rhythm, S1, S2 normal, no murmur, rub or gallop.  Lungs: Clear without wheezes, rales, rhonchi or dullness.  Normal excursions and effort.  Abdomen: Soft, non-tender, non-distended, normal bowel sounds   Extremities: Without clubbing, cyanosis or edema.  Peripheral pulses are 2+. Missinf digits L hand  Neurologic: Alert and oriented, normal affect.  Psych: normal mood and affect  Skin: Warm and dry.       Laboratories and Data:  Diagnostics:      Labs:   CBC:    Lab Results   Component Value Date    WBC 7.3 06/18/2024    WBC 8.6 06/17/2024    WBC 7.8 06/17/2024     Lab Results   Component Value Date    HGB 7.8 (L) 06/18/2024    HGB 8.9 (L) 06/17/2024    HGB 7.1 (L) 06/17/2024      Lab Results   Component Value Date    .0 06/18/2024    .0 06/17/2024    .0 06/17/2024     BMP:   No results found for: \"GLUCOSE\"  Lab Results   Component Value Date    K 3.5 06/18/2024    K 3.3 (L) 06/17/2024    K 3.2 (L) 06/16/2024     Lab Results   Component Value Date    BUN 23 06/18/2024    BUN 39 (H) 06/17/2024    BUN 30 (H) 06/16/2024     Lab Results   Component Value Date    CREATSERUM 4.28 (H) 06/18/2024    CREATSERUM 6.06 (H) 06/17/2024    CREATSERUM 5.07 (H) 06/16/2024     Cholesterol:     Lab Results   Component Value Date    CHOLEST 170 06/23/2023    CHOLEST 157 11/23/2022    CHOLEST 168 08/31/2022     Lab Results   Component Value Date    HDL 43 06/23/2023    HDL 36 (L) 11/23/2022    HDL 43 08/31/2022     Lab Results   Component Value Date    TRIG 132 06/23/2023    TRIG 143 11/23/2022    TRIG 109 08/31/2022     Lab Results   Component Value Date     (H) 06/23/2023    LDL 96 11/23/2022     (H) 08/31/2022     Lab Results   Component Value Date    AST 16 06/16/2024    AST 21 06/15/2024    AST 25 06/13/2024     Lab Results   Component Value Date    ALT 12 06/16/2024    ALT 16 06/15/2024    ALT 27 06/13/2024

## 2024-06-18 NOTE — PROGRESS NOTES
[facility]    Progress Note    Ollie Hernández Patient Status:  Inpatient    1931 MRN [id number]   Location [facility] 3W/SW Attending Jesus De Anda MD   Hosp Day # 5 PCP Wili Parmar MD     SUBJECTIVE   Patient is a 79 year old male who was admitted to the hospital for Rectal bleeding:    Overnight: feels well.  Less gasseous distension.  Small BM this AM.  Denies drew rectal bleeding.  Larger BM yesterday.  No new complaints.      Current Medications:  Current Facility-Administered Medications   Medication Dose Route Frequency    epoetin donna (Epogen, Procrit) 57461 UNIT/ML injection 10,000 Units  10,000 Units Subcutaneous Once per day on     sodium chloride 0.9 % IV bolus 100 mL  100 mL Intravenous Q30 Min PRN    And    albumin human (Albumin) 25% injection 25 g  25 g Intravenous PRN Dialysis    simethicone (Mylicon) chewable tab 80 mg  80 mg Oral TID CC and HS    albuterol (Ventolin HFA) 108 (90 Base) MCG/ACT inhaler 2 puff  2 puff Inhalation Q4H PRN    fluticasone propionate (Flonase) 50 MCG/ACT nasal suspension 2 spray  2 spray Nasal Daily    pantoprazole (Protonix) DR tab 40 mg  40 mg Oral QAM AC    cetirizine (ZyrTEC) tab 5 mg  5 mg Oral QOD    tetrahydrozoline (Visine) 0.05 % ophthalmic solution 1 drop  1 drop Both Eyes BID PRN    traMADol (Ultram) tab 50 mg  50 mg Oral Q12H PRN    carvedilol (Coreg) tab 25 mg  25 mg Oral BID    atorvastatin (Lipitor) tab 40 mg  40 mg Oral Nightly    escitalopram (Lexapro) tab 5 mg  5 mg Oral Daily    hydrALAZINE (Apresoline) tab 25 mg  25 mg Oral Q8H STEPHANIE    linaGLIPtin (Tradjenta) tab 5 mg  5 mg Oral Daily    fluticasone furoate-vilanterol (Breo Ellipta) 200-25 MCG/ACT inhaler 1 puff  1 puff Inhalation Daily    methocarbamol (Robaxin) tab 500 mg  500 mg Oral BID PRN    glucose (Dex4) 15 GM/59ML oral liquid 15 g  15 g Oral Q15 Min PRN    Or    glucose (Glutose) 40% oral gel 15 g  15 g Oral Q15 Min PRN    Or     glucose-vitamin C (Dex-4) chewable tab 4 tablet  4 tablet Oral Q15 Min PRN    Or    dextrose 50% injection 50 mL  50 mL Intravenous Q15 Min PRN    Or    glucose (Dex4) 15 GM/59ML oral liquid 30 g  30 g Oral Q15 Min PRN    Or    glucose (Glutose) 40% oral gel 30 g  30 g Oral Q15 Min PRN    Or    glucose-vitamin C (Dex-4) chewable tab 8 tablet  8 tablet Oral Q15 Min PRN    acetaminophen (Tylenol Extra Strength) tab 500 mg  500 mg Oral Q4H PRN    melatonin tab 3 mg  3 mg Oral Nightly PRN    polyethylene glycol (PEG 3350) (Miralax) 17 g oral packet 17 g  17 g Oral Daily PRN    sennosides (Senokot) tab 17.2 mg  17.2 mg Oral Nightly PRN    bisacodyl (Dulcolax) 10 MG rectal suppository 10 mg  10 mg Rectal Daily PRN    ondansetron (Zofran) 4 MG/2ML injection 4 mg  4 mg Intravenous Q6H PRN    metoclopramide (Reglan) 5 mg/mL injection 5 mg  5 mg Intravenous Q8H PRN    insulin aspart (NovoLOG) 100 Units/mL FlexPen 1-5 Units  1-5 Units Subcutaneous TID CC    clopidogrel (Plavix) tab 75 mg  75 mg Oral Daily    aspirin DR tab 81 mg  81 mg Oral Daily       Medications Prior to Admission   Medication Sig    linaGLIPtin (TRADJENTA) 5 mg Oral Tab Take 1 tablet (5 mg total) by mouth daily.    hydrALAZINE 25 MG Oral Tab Take 1 tablet (25 mg total) by mouth every 8 (eight) hours.    clopidogrel 75 MG Oral Tab Take 1 tablet (75 mg total) by mouth daily.    escitalopram 5 MG Oral Tab Take 1 tablet (5 mg total) by mouth daily.    carvedilol 25 MG Oral Tab Take 1 tablet (25 mg total) by mouth 2 (two) times daily.    atorvastatin 40 MG Oral Tab Take 1 tablet (40 mg total) by mouth nightly.    apixaban 5 MG Oral Tab Take 1 tablet (5 mg total) by mouth 2 (two) times daily.    acetaminophen 500 MG Oral Tab Take 1 tablet (500 mg total) by mouth daily as needed for Pain.    cetirizine 10 MG Oral Tab Take 1 tablet (10 mg total) by mouth every other day.    Cholecalciferol 125 MCG (5000 UT) Oral Tab Take 1 tablet (5,000 Units total) by mouth 2  (two) times daily.    tetrahydrozoline 0.05 % Ophthalmic Solution 1 drop 2 (two) times daily as needed.    felodipine ER 10 MG Oral Tablet 24 Hr Take 1 tablet (10 mg total) by mouth daily.    fluticasone propionate 50 MCG/ACT Nasal Suspension 2 sprays by Nasal route daily.    pantoprazole 40 MG Oral Tab EC Take 1 tablet (40 mg total) by mouth every morning before breakfast.    albuterol (PROAIR HFA) 108 (90 Base) MCG/ACT Inhalation Aero Soln Inhale 2 puffs into the lungs every 4 (four) hours as needed for Wheezing.    Budesonide-Formoterol Fumarate (SYMBICORT) 160-4.5 MCG/ACT Inhalation Aerosol Inhale 2 puffs into the lungs 2 (two) times daily. (Patient taking differently: Inhale 2 puffs into the lungs 2 (two) times daily as needed.)    Darbepoetin Randell 60 MCG/ML Injection Solution Inject into the vein as needed.    polyethylene glycol, PEG 3350, 17 g Oral Powd Pack 17 g Wed, Thurs, Sat    traMADol 50 MG Oral Tab Take 1 tablet (50 mg total) by mouth every 12 (twelve) hours as needed for Pain.    Glucose Blood (CONTOUR NEXT TEST) In Vitro Strip Test 3 times daily    methocarbamol 500 MG Oral Tab Take 1 tablet (500 mg total) by mouth 2 (two) times daily as needed.    Insulin Pen Needle (PEN NEEDLES) 32G X 4 MM Does not apply Misc 1 each daily.         Allergies  Allergies   Allergen Reactions    Adhesive Tape OTHER (SEE COMMENTS)     Severe rashes    Dust Mite Extract RASH       Physical Exam:   Blood pressure 127/85, pulse 67, temperature 98.4 °F (36.9 °C), temperature source Oral, resp. rate 18, height 5' 4\" (1.626 m), weight 156 lb 8 oz (71 kg), SpO2 100%.    General appearance:  alert, appears stated age and cooperative    HEENT: negative findings: lids and lashes normal and conjunctivae and sclerae normal.     Pulmonary: clear to auscultation bilaterally of anterior chest    Cardiovascular: regular rate and rhythm    Abdominal: soft, bowel sounds present; non-distended, non-tender    Extremities: No edema,  cyanosis, or clubbing    Skin: warm and dry    Neurologic: Grossly normal    Psychiatric: calm      Results:     Laboratory Data:  Lab Results   Component Value Date    WBC 7.3 06/18/2024    HGB 7.9 (L) 06/18/2024    HCT 24.3 (L) 06/18/2024    .0 06/18/2024    CREATSERUM 4.28 (H) 06/18/2024    BUN 23 06/18/2024     06/18/2024    K 3.5 06/18/2024     06/18/2024    CO2 29.0 06/18/2024     (H) 06/18/2024    CA 8.4 (L) 06/18/2024    ALB 3.1 (L) 06/18/2024    ALKPHO 43 (L) 06/16/2024    TP 5.2 (L) 06/16/2024    AST 16 06/16/2024    ALT 12 06/16/2024    T4F 0.9 08/31/2022    TSH 2.060 06/23/2023     (H) 07/25/2023    PSA 2.94 10/20/2021    ESRML 10 06/16/2024    CRP 1.30 (H) 06/16/2024    MG 2.1 11/20/2023    PHOS 4.0 06/18/2024    TROP <0.045 07/25/2019     08/05/2023    B12 1,237 (H) 06/16/2024       Imaging:  No results found.     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 Resolved per pt.                           Constipation              -miralax daily           CAD s/p recent PCI              -on antiplatelet therapy    Acute drop in hemoglobin with elevated ferritin and low iron studies support anemia of chronic disease and related to ESRD. CRP elevated.  S/p IV iron   -renal following. Consideration for Epo   -transfuse prn   -if overt GIB and dropping Hgb, consider for inpatient VCE. Pt has had this in the past and reportedly normal.  Likely low yield.  Offered to pt and he declines at this time.  Consideration for repeat in future as outpt per pt request.       OK with discharge home from Gi standpoint    Thank you for allowing me to participate in the care of your patient.    Time spent in direct patient contact and decision making as well as counseling/coordination of care:  50 minutes      Note to patient: The 21st Century Cures Act makes medical notes like these available to patients in the interest of transparency. However, this is a medical document intended as peer to peer communication. It is written in medical language and may contain abbreviations or verbiage that are unfamiliar. It may appear blunt or direct. Medical documents are intended to carry relevant information, facts as evident, and the clinical opinion of the practitioner.      DO Faith Yao [facility]  Gastroenterology

## 2024-06-19 ENCOUNTER — PATIENT OUTREACH (OUTPATIENT)
Dept: CASE MANAGEMENT | Age: 79
End: 2024-06-19

## 2024-06-19 LAB — ERYTHROPOIETIN: 273.3 MIU/ML

## 2024-06-19 NOTE — PROGRESS NOTES
NCM spoke with patient's wife who states patient is at dialysis right now, requests a call back after 3:00 pm.    Discharge Dx:   Principal Problem:    Rectal bleeding  Active Problems:    Mixed hyperlipidemia    Pulmonary HTN (HCC)    KRAIG (obstructive sleep apnea)    Type 2 diabetes mellitus with chronic kidney disease on chronic dialysis, with long-term current use of insulin (HCC)    Chronic obstructive asthma (HCC)    Primary hypertension    ESRD (end stage renal disease) on dialysis (HCC)    Anticoagulated    Antiplatelet or antithrombotic long-term use

## 2024-06-19 NOTE — PROGRESS NOTES
Initial Post Discharge Follow Up   Discharge Date: 6/18/24  Contact Date: 6/19/2024    Consent Verification:  Assessment Completed With: Patient  HIPAA Verified?  Yes    Discharge Dx:   Principal Problem:    Rectal bleeding    General:   How have you been since your discharge from the hospital? Patient states he is ok, still weak, states he has been home only a couple of hours from dialysis and he has been trying to rest. NCM will call patient back tomorrow.

## 2024-06-19 NOTE — DISCHARGE SUMMARY
[facility]  part of [facility]    Discharge Summary    Ollie Hernández Patient Status:  Inpatient    1945 MRN [id number]   Location [facility] 4W/SW/SE Attending No att. providers found   Hosp Day # 5 PCP Wili Parmar MD     Date of Admission: 2024   Date of Discharge: 2024    Admitting Diagnosis: Rectal bleeding [K62.5]  Anticoagulated [Z79.01]  Antiplatelet or antithrombotic long-term use [Z79.02]  ESRD (end stage renal disease) on dialysis (HCC) [N18.6, Z99.2]    Disposition: Home with Home Health Care    Discharge Diagnosis: .Principal Problem:    Rectal bleeding  Active Problems:    Mixed hyperlipidemia    Pulmonary HTN (HCC)    KRAIG (obstructive sleep apnea)    Type 2 diabetes mellitus with chronic kidney disease on chronic dialysis, with long-term current use of insulin (HCC)    Chronic obstructive asthma (HCC)    Primary hypertension    ESRD (end stage renal disease) on dialysis ([facility])    Anticoagulated    Antiplatelet or antithrombotic long-term use      Hospital Course:   Reason for Admission:   Acute GI bleed, hematochezia    Discharge Physical Exam:  Vital Signs:  Blood pressure 127/85, pulse 82, temperature 98.4 °F (36.9 °C), temperature source Oral, resp. rate 18, height 5' 4\" (1.626 m), weight 156 lb 8 oz (71 kg), SpO2 100%.     Gen: No acute distress, alert and oriented x3; examined while starting dialysis  Pulm: Lungs CTAB, no rhonchi or wheezing  CV: Heart IRR  Abd: Abdomen soft, mildly distended-improved from yesterday  MSK: +RLE edema-improved; no LLE edema  Skin: no rashes or lesions  Neuro: A&O x 3    Hospital Course:   79-year-old male with past medical history of coronary artery disease status post recent coronary stenting on 2024, currently on Plavix, paroxysmal atrial fibrillation on Eliquis, ESRD on hemodialysis, hypertension admitted with rectal bleeding.  He noticed rectal bleeding with a bowel movement on the day of admission. He was  otherwise feeling ok. This morning, he is feeling tired. Reports chronic flatulence but otherwise no abdominal pain.      Of note, pt was recently discharged 6/1/24 for rectal bleeding. EGD and colonoscopy with small hepatic flexure versus proximal transverse colon lesion 1 mm/AVMs due for lesion status post cautery.  Prior to discharge, patient was resumed on Eliquis and Plavix and hemoglobin .     GI consulted on this admission. Required 2 U PRBC while monitoring blood counts. Received IV Venofer x 1. Colonoscopy 6/15/2024 no active bleeding; ascending colon ulcer with single clip-clean based with small red spot, exacerbated by irrigation and second clip placed; small internal hemorrhoids-no bleeding, small 1-2mm splenic flexure polyp (not removed d/t high risk bleeding). Cardiology consulted with plans to undergo DAPT x 1 month. Then transition to ASA/ELiquis x 6 months. Patient received HD directed by Nephrology; Hb stabilized with further medical management. Stable for DC 6/18      Full plan on discharge day as follows:    Rectal bleeding   Acute on chronic anemia  -pt with hx of constipation/hemorrhoids/rectal bleeding in the past (saw GI Dr. Gautam for this in 10/2020)  -last colonoscopy 1/25/21 (Dr. Gautam) -- internal hemorrhoids, colon polyps x 2 (one tubular adenoma)  Recent egd/colonoscopy for GI bleeding 6/1/2024 demonstrating hepatic flexure AVM, which was cauterized  -eliquis on hold  -s/p 1prbc 6/14/24; Hgb 6.7>7.9>7.3>6.4; receiving 1u PRBC 6/16 -> 7.4 -> 7.1 > 8.9 > 7.8  -multifactorial possibly from hemoconcentration from dialysis yesterday (all cell lines down), may be equilibrating  - Plan to repeat H&H later this morning  -appreciate GI consult, started on IV Venofer x 1 dose 6/16  -colonoscopy 6/15/2024: no active bleeding; ascending colon ulcer with single clip-clean based with small red spot, exacerbated by irrigation and second clip placed; small internal hemorrhoids-no bleeding, small 1-2mm  splenic flexure polyp (not removed d/t high risk bleeding)  - Repeat Hb prior to discharge stable 7.9, no further bleeding episodes     RLE edema  Check venous doppler  - No evidence of RLE DVT ( off eliquis)     Abdominal gaseous distension  Gasx TID and HS     CAD  -C 5/21/2024 by Dr. Luis Orozco: 70% lesion of apical LAD, 80% lesion of LCx  -S/p PCI of LCx (medical management of the LAD lesion)  -s/p aspirin, plavix and eliquis x 1 week, now just plavix and eliquis  -cardiology consulted--resume aspirin and plavix; plan to hold eliquis until completing 1 month DAPT     Pharyngeal dysphagia  --right side hypomobile oropharyngeal dysphagia  - swallow study 4/2024: intermittent shallow and deep laryngeal penetration without aspiration, small zenker's diverticulum, bulky endplate osteophytes contributing to dysphagia  - to see Dr. Del Angel (ENT at [facility]) for further imaging     Paroxysmal A-fib  --dx'ed 9/2023  --Zio monitor 10/2023 -- NSVT (normal EF 65%); on carvedilol  - followed by EP Dr. Phelan  --Eliquis on hold in favor of DAPT     Cervical radiculopathy  Chronic back pain with neuropathy  -CT scan of the cervical neck 7/2019 ; history of cervical decompressive laminectomy with multilevel degenerative disc disease  - s/p physical therapy in the past  --MRI brain and MRI cervical spine done 12/29/22 (MRI brain w/mod chronic microvascular ischemic changes bilat cerebral hemispheres, basal ganglia and thalami; MRI cervical spine w/multilevel spinal stenosis)  --saw NS PA Dr. Araujo in 1/2023; had subsequent MRI thoracic/lumbar spine.  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  Pt was referred for PT in 2/2023.      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 Sevilla  --HgbA1c 5.5 in 9/2023  - Gabapentin  for neuropathy  --at home: on Tradjenta 5mg daily (no longer on insulin)  -- SSI in hospital  ---180s     ESRD on hemodialysis  - 2/2 prolonged history of diabetes and hypertension  - Follows with Dr. Martinez  -HD per Dr. Callahan, 3 L removed yesterday     Hypertension  - carvedilol 25 mg BID, hydralazine 25mg q8h, felodipine 10mg/day (on amlodipine in hosp due to formulary)     Hyperlipidemia  - Continue with On atorvastatin 40 mg daily, aspirin     BPH  -no longer takes trospium or tamsulosin     Pulmonary hypertension  -Comanagement of KRAIG and chronic diastolic heart failure  - stable     KRAIG on CPAP  - sees pulm Dr. Landeros     Cataracts  -Seems to be stable, follows with Dr. Chase of ophthalmology     Gout  Flareup in 6/2022. NO recurrence  -Seems to be stable     Anemia of ESRD  -Baseline hemoglobin seems to be around 10-11  -managed by nephrology; on aranesp  -Hgb decreased as above  - Started on IV Venofer as above     Sensorineural hearing loss  Mild-moderate per audiology testing/14/2022.  He may be a hearing aid candidate in the future     Unsteady Gait  Ongoing fatigue due to multiple comorbidities as above  - uses a walker, 2 wheeled-2 constipation.  Does not want a rollator walker at this time  - He is a fall risk with his unsteady gait, fatigue.     Anxiety  Lexapro 5 mg once a day.      Complications: None    Consultants         Provider   Role Specialty     Emerson Julio MD      Consulting Physician Cardiovascular Diseases     Herman Phelan MD      Consulting Physician Cardiac Electrophysiology     Elke Martinez MD      Consulting Physician NEPHROLOGY     Carlyn Storey MD      Consulting Physician GASTROENTEROLOGY          Surgical Procedures       Case IDs Date Procedure Surgeon Location Status    [id number] 6/15/24 COLONOSCOPY Carlyn Storey MD [facility] ENDOSCOPY Comp          Pending Labs       Order Current Status    Erythropoietin (EPO), Serum In process            Discharge Plan:   Discharge  Condition: Stable    Discharge Medication List as of 6/18/2024  1:05 PM        New Orders    Details   aspirin 81 MG Oral Tab EC Take 1 tablet (81 mg total) by mouth daily., Normal, Disp-90 tablet, R-3      simethicone 80 MG Oral Chew Tab Chew 1 tablet (80 mg total) by mouth 4 (four) times daily with meals and nightly., Normal, Disp-80 tablet, R-0           Home Meds - Unchanged    Details   linaGLIPtin (TRADJENTA) 5 mg Oral Tab Take 1 tablet (5 mg total) by mouth daily., Normal, Disp-90 tablet, R-1Please send a replace/new response with 90-Day Supply if appropriate to maximize member benefit. Requesting 1 year supply.      hydrALAZINE 25 MG Oral Tab Take 1 tablet (25 mg total) by mouth every 8 (eight) hours., Normal, Disp-90 tablet, R-1      clopidogrel 75 MG Oral Tab Take 1 tablet (75 mg total) by mouth daily., Normal, Disp-90 tablet, R-1      escitalopram 5 MG Oral Tab Take 1 tablet (5 mg total) by mouth daily., Normal, Disp-90 tablet, R-3      carvedilol 25 MG Oral Tab Take 1 tablet (25 mg total) by mouth 2 (two) times daily., Historical      atorvastatin 40 MG Oral Tab Take 1 tablet (40 mg total) by mouth nightly., Normal, Disp-90 tablet, R-3      apixaban 5 MG Oral Tab Take 1 tablet (5 mg total) by mouth 2 (two) times daily., Historical      acetaminophen 500 MG Oral Tab Take 1 tablet (500 mg total) by mouth daily as needed for Pain., Historical      cetirizine 10 MG Oral Tab Take 1 tablet (10 mg total) by mouth every other day., Historical      Cholecalciferol 125 MCG (5000 UT) Oral Tab Take 1 tablet (5,000 Units total) by mouth 2 (two) times daily., Historical      tetrahydrozoline 0.05 % Ophthalmic Solution 1 drop 2 (two) times daily as needed., Historical      felodipine ER 10 MG Oral Tablet 24 Hr Take 1 tablet (10 mg total) by mouth daily., Normal, Disp-90 tablet, R-3MUST BE SEEN FOR ANY FURTHER REFILLS. YOUR PATIENT HAS REQUESTED A REFILL OF THIS MEDICATION, PREVIOUSLY AUTHORIZED BY ANOTHER PRESCRIBER.       fluticasone propionate 50 MCG/ACT Nasal Suspension 2 sprays by Nasal route daily., Normal, Disp-48 g, R-3      pantoprazole 40 MG Oral Tab EC Take 1 tablet (40 mg total) by mouth every morning before breakfast., Normal, Disp-90 tablet, R-3      albuterol (PROAIR HFA) 108 (90 Base) MCG/ACT Inhalation Aero Soln Inhale 2 puffs into the lungs every 4 (four) hours as needed for Wheezing., Normal, Disp-3 each, R-3      Budesonide-Formoterol Fumarate (SYMBICORT) 160-4.5 MCG/ACT Inhalation Aerosol Inhale 2 puffs into the lungs 2 (two) times daily., Normal, Disp-3 each, R-3      Darbepoetin Randell 60 MCG/ML Injection Solution Inject into the vein as needed., Historical      polyethylene glycol, PEG 3350, 17 g Oral Powd Pack 17 g Wed, Thurs, Sat, Historical      traMADol 50 MG Oral Tab Take 1 tablet (50 mg total) by mouth every 12 (twelve) hours as needed for Pain., Historical      Glucose Blood (CONTOUR NEXT TEST) In Vitro Strip Test 3 times daily, Normal, Disp-300 each, R-1Type 2 diabetes mellitus with both eyes affected by proliferative retinopathy and 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 Schedule an appointment as soon as possible for a visit in 1 week(s).    Specialty: Internal Medicine  Why: Posthospital follow-up in 1-2 weeks  Contact information:  [address] [place] [id number]  [phone]               Carlyn Storey MD. Schedule an appointment as soon as possible for a visit in 2 week(s).    Specialty: GASTROENTEROLOGY  Contact information:  [address]  [facility]  [address]  [facility] [address]  [phone]                             Follow up Labs: None         Other Discharge Instructions:         You are seen in the hospital due to recurrent gastrointestinal bleeding while on blood thinning medication.  You are evaluated by gastroenterology with a second intervention on the known colonic ulcer.  Internal hemorrhoids were also noted.  There was no active bleeding at the time of repeat colonoscopy  - Did require blood transfusion to maintain adequate hemoglobin levels    As we need to preserve the integrity of the stent, we need to maintain aspirin and Plavix for a total of 1 month  - We will then transition to aspirin and Eliquis over 6 months (This will start July 16th as long as there are no complications)  - Wente to monitor for any further bleeding episodes as we need to maintain the blood thinners recommended by cardiology    You did receive hemodialysis as scheduled    Follow-up with primary care clinic in 1-2 weeks        I spent > 30 minutes in the coordination of care    Wili Parmar MD  6/19/2024

## 2024-06-19 NOTE — PAYOR COMM NOTE
--------------  DISCHARGE REVIEW    Payor: [employer]  Subscriber #:  [id number]  Authorization Number: [id number]    Admit date: 24  Admit time:   1:42 PM  Discharge Date: 2024  2:43 PM     Admitting Physician: Wili Parmar MD  Attending Physician:  No att. providers found  Primary Care Physician: Wili Parmar MD          Discharge Summary Notes        Discharge Summary signed by Wili Parmar MD at 2024  5:40 AM       Author: Wili Parmar MD Specialty: Internal Medicine Author Type: Physician    Filed: 2024  5:40 AM Date of Service: 2024  5:33 AM Status: Signed    : Wili Parmar MD (Physician)         [facility]  [facility] of [facility]    Discharge Summary    Ollie Hernández Patient Status:  Inpatient    1945 MRN [id number]   Location [facility] 4W/SW/SE Attending No att. providers found   Hosp Day # 5 PCP Wili Parmar MD     Date of Admission: 2024   Date of Discharge: 2024    Admitting Diagnosis: Rectal bleeding [K62.5]  Anticoagulated [Z79.01]  Antiplatelet or antithrombotic long-term use [Z79.02]  ESRD (end stage renal disease) on dialysis (HCC) [N18.6, Z99.2]    Disposition: Home with Home Health Care    Discharge Diagnosis: .Principal Problem:    Rectal bleeding  Active Problems:    Mixed hyperlipidemia    Pulmonary HTN (HCC)    KRAIG (obstructive sleep apnea)    Type 2 diabetes mellitus with chronic kidney disease on chronic dialysis, with long-term current use of insulin (HCC)    Chronic obstructive asthma (HCC)    Primary hypertension    ESRD (end stage renal disease) on dialysis (HCC)    Anticoagulated    Antiplatelet or antithrombotic long-term use      Hospital Course:   Reason for Admission:   Acute GI bleed, hematochezia    Discharge Physical Exam:  Vital Signs:  Blood pressure 127/85, pulse 82, temperature 98.4 °F (36.9 °C), temperature source Oral, resp. rate 18, height 5' 4\" (1.626 m), weight 156 lb 8  oz (71 kg), SpO2 100%.     Gen: No acute distress, alert and oriented x3; examined while starting dialysis  Pulm: Lungs CTAB, no rhonchi or wheezing  CV: Heart IRR  Abd: Abdomen soft, mildly distended-improved from yesterday  MSK: +RLE edema-improved; no LLE edema  Skin: no rashes or lesions  Neuro: A&O x 3    Hospital Course:   79-year-old male with past medical history of coronary artery disease status post recent coronary stenting on 5/21/2024, currently on Plavix, paroxysmal atrial fibrillation on Eliquis, ESRD on hemodialysis, hypertension admitted with rectal bleeding.  He noticed rectal bleeding with a bowel movement on the day of admission. He was otherwise feeling ok. This morning, he is feeling tired. Reports chronic flatulence but otherwise no abdominal pain.      Of note, pt was recently discharged 6/1/24 for rectal bleeding. EGD and colonoscopy with small hepatic flexure versus proximal transverse colon lesion 1 mm/AVMs due for lesion status post cautery.  Prior to discharge, patient was resumed on Eliquis and Plavix and hemoglobin .     GI consulted on this admission. Required 2 U PRBC while monitoring blood counts. Received IV Venofer x 1. Colonoscopy 6/15/2024 no active bleeding; ascending colon ulcer with single clip-clean based with small red spot, exacerbated by irrigation and second clip placed; small internal hemorrhoids-no bleeding, small 1-2mm splenic flexure polyp (not removed d/t high risk bleeding). Cardiology consulted with plans to undergo DAPT x 1 month. Then transition to ASA/ELiquis x 6 months. Patient received HD directed by Nephrology; Hb stabilized with further medical management. Stable for DC 6/18      Full plan on discharge day as follows:    Rectal bleeding   Acute on chronic anemia  -pt with hx of constipation/hemorrhoids/rectal bleeding in the past (saw GI Dr. Gautam for this in 10/2020)  -last colonoscopy 1/25/21 (Dr. Gautam) -- internal hemorrhoids, colon polyps x 2 (one  tubular adenoma)  Recent egd/colonoscopy for GI bleeding 6/1/2024 demonstrating hepatic flexure AVM, which was cauterized  -eliquis on hold  -s/p 1prbc 6/14/24; Hgb 6.7>7.9>7.3>6.4; receiving 1u PRBC 6/16 -> 7.4 -> 7.1 > 8.9 > 7.8  -multifactorial possibly from hemoconcentration from dialysis yesterday (all cell lines down), may be equilibrating  - Plan to repeat H&H later this morning  -appreciate GI consult, started on IV Venofer x 1 dose 6/16  -colonoscopy 6/15/2024: no active bleeding; ascending colon ulcer with single clip-clean based with small red spot, exacerbated by irrigation and second clip placed; small internal hemorrhoids-no bleeding, small 1-2mm splenic flexure polyp (not removed d/t high risk bleeding)  - Repeat Hb prior to discharge stable 7.9, no further bleeding episodes     RLE edema  Check venous doppler  - No evidence of RLE DVT ( off eliquis)     Abdominal gaseous distension  Gasx TID and HS     CAD  -LHC 5/21/2024 by Dr. Luis Orozco: 70% lesion of apical LAD, 80% lesion of LCx  -S/p PCI of LCx (medical management of the LAD lesion)  -s/p aspirin, plavix and eliquis x 1 week, now just plavix and eliquis  -cardiology consulted--resume aspirin and plavix; plan to hold eliquis until completing 1 month DAPT     Pharyngeal dysphagia  --right side hypomobile oropharyngeal dysphagia  - swallow study 4/2024: intermittent shallow and deep laryngeal penetration without aspiration, small zenker's diverticulum, bulky endplate osteophytes contributing to dysphagia  - to see Dr. Del Angel (ENT at [facility]) for further imaging     Paroxysmal A-fib  --dx'ed 9/2023  --Zio monitor 10/2023 -- NSVT (normal EF 65%); on carvedilol  - followed by EP Dr. Phelan  --Eliquis on hold in favor of DAPT     Cervical radiculopathy  Chronic back pain with neuropathy  -CT scan of the cervical neck 7/2019 ; history of cervical decompressive laminectomy with multilevel degenerative disc disease  - s/p physical therapy in the  past  --MRI brain and MRI cervical spine done 12/29/22 (MRI brain w/mod chronic microvascular ischemic changes bilat cerebral hemispheres, basal ganglia and thalami; MRI cervical spine w/multilevel spinal stenosis)  --saw VINICIO Araujo in 1/2023; had subsequent MRI thoracic/lumbar spine.  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  Pt was referred for PT in 2/2023.      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 Sevilla  --HgbA1c 5.5 in 9/2023  - Gabapentin for neuropathy  --at home: on Tradjenta 5mg daily (no longer on insulin)  -- SSI in hospital  ---180s     ESRD on hemodialysis  - 2/2 prolonged history of diabetes and hypertension  - Follows with Dr. Martinez  -HD per Dr. Callahan, 3 L removed yesterday     Hypertension  - carvedilol 25 mg BID, hydralazine 25mg q8h, felodipine 10mg/day (on amlodipine in hosp due to formulary)     Hyperlipidemia  - Continue with On atorvastatin 40 mg daily, aspirin     BPH  -no longer takes trospium or tamsulosin     Pulmonary hypertension  -Comanagement of KRAIG and chronic diastolic heart failure  - stable     KRAIG on CPAP  - sees pulm Dr. Landeros     Cataracts  -Seems to be stable, follows with Dr. Chase of ophthalmology     Gout  Flareup in 6/2022. NO recurrence  -Seems to be stable     Anemia of ESRD  -Baseline hemoglobin seems to be around 10-11  -managed by nephrology; on aranesp  -Hgb decreased as above  - Started on IV Venofer as above     Sensorineural hearing loss  Mild-moderate per audiology testing/14/2022.  He may be a hearing aid candidate in the future     Unsteady Gait  Ongoing fatigue due to multiple comorbidities as above  - uses a walker, 2 wheeled-2 constipation.  Does not want a rollator walker at this time  - He is a fall risk with his unsteady  gait, fatigue.     Anxiety  Lexapro 5 mg once a day.      Complications: None    Consultants         Provider   Role Specialty     Emerson Julio MD      Consulting Physician Cardiovascular Diseases     Herman Phelan MD      Consulting Physician Cardiac Electrophysiology     Elke Martinez MD      Consulting Physician NEPHROLOGY     Carlyn Storey MD      Consulting Physician GASTROENTEROLOGY          Surgical Procedures       Case IDs Date Procedure Surgeon Location Status    [id number] 6/15/24 COLONOSCOPY Carlyn Storey MD [facility] ENDOSCOPY Comp          Pending Labs       Order Current Status    Erythropoietin (EPO), Serum In process            Discharge Plan:   Discharge Condition: Stable    Discharge Medication List as of 6/18/2024  1:05 PM        New Orders    Details   aspirin 81 MG Oral Tab EC Take 1 tablet (81 mg total) by mouth daily., Normal, Disp-90 tablet, R-3      simethicone 80 MG Oral Chew Tab Chew 1 tablet (80 mg total) by mouth 4 (four) times daily with meals and nightly., Normal, Disp-80 tablet, R-0           Home Meds - Unchanged    Details   linaGLIPtin (TRADJENTA) 5 mg Oral Tab Take 1 tablet (5 mg total) by mouth daily., Normal, Disp-90 tablet, R-1Please send a replace/new response with 90-Day Supply if appropriate to maximize member benefit. Requesting 1 year supply.      hydrALAZINE 25 MG Oral Tab Take 1 tablet (25 mg total) by mouth every 8 (eight) hours., Normal, Disp-90 tablet, R-1      clopidogrel 75 MG Oral Tab Take 1 tablet (75 mg total) by mouth daily., Normal, Disp-90 tablet, R-1      escitalopram 5 MG Oral Tab Take 1 tablet (5 mg total) by mouth daily., Normal, Disp-90 tablet, R-3      carvedilol 25 MG Oral Tab Take 1 tablet (25 mg total) by mouth 2 (two) times daily., Historical      atorvastatin 40 MG Oral Tab Take 1 tablet (40 mg total) by mouth nightly., Normal, Disp-90 tablet, R-3      apixaban 5 MG Oral Tab Take 1 tablet (5 mg total) by mouth 2 (two) times daily., Historical       acetaminophen 500 MG Oral Tab Take 1 tablet (500 mg total) by mouth daily as needed for Pain., Historical      cetirizine 10 MG Oral Tab Take 1 tablet (10 mg total) by mouth every other day., Historical      Cholecalciferol 125 MCG (5000 UT) Oral Tab Take 1 tablet (5,000 Units total) by mouth 2 (two) times daily., Historical      tetrahydrozoline 0.05 % Ophthalmic Solution 1 drop 2 (two) times daily as needed., Historical      felodipine ER 10 MG Oral Tablet 24 Hr Take 1 tablet (10 mg total) by mouth daily., Normal, Disp-90 tablet, R-3MUST BE SEEN FOR ANY FURTHER REFILLS. YOUR PATIENT HAS REQUESTED A REFILL OF THIS MEDICATION, PREVIOUSLY AUTHORIZED BY ANOTHER PRESCRIBER.      fluticasone propionate 50 MCG/ACT Nasal Suspension 2 sprays by Nasal route daily., Normal, Disp-48 g, R-3      pantoprazole 40 MG Oral Tab EC Take 1 tablet (40 mg total) by mouth every morning before breakfast., Normal, Disp-90 tablet, R-3      albuterol (PROAIR HFA) 108 (90 Base) MCG/ACT Inhalation Aero Soln Inhale 2 puffs into the lungs every 4 (four) hours as needed for Wheezing., Normal, Disp-3 each, R-3      Budesonide-Formoterol Fumarate (SYMBICORT) 160-4.5 MCG/ACT Inhalation Aerosol Inhale 2 puffs into the lungs 2 (two) times daily., Normal, Disp-3 each, R-3      Darbepoetin Randell 60 MCG/ML Injection Solution Inject into the vein as needed., Historical      polyethylene glycol, PEG 3350, 17 g Oral Powd Pack 17 g Wed, Thurs, Sat, Historical      traMADol 50 MG Oral Tab Take 1 tablet (50 mg total) by mouth every 12 (twelve) hours as needed for Pain., Historical      Glucose Blood (CONTOUR NEXT TEST) In Vitro Strip Test 3 times daily, Normal, Disp-300 each, R-1Type 2 diabetes mellitus with both eyes affected by proliferative retinopathy and 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 Schedule an appointment as soon as possible for a visit in 1 week(s).    Specialty: Internal Medicine  Why: Posthospital follow-up in 1-2 weeks  Contact information:  [address] [facility] [address]  [phone]               Carlyn Storey MD. Schedule an appointment as soon as possible for a visit in 2 week(s).    Specialty: GASTROENTEROLOGY  Contact information:  [address] [facility]  [address]  [facility] [id number] [phone]                             Follow up Labs: None         Other Discharge Instructions:         You are seen in the hospital due to recurrent gastrointestinal bleeding while on blood thinning medication.  You are evaluated by gastroenterology with a second intervention on the known colonic ulcer.  Internal hemorrhoids were also noted.  There was no active bleeding at the time of repeat colonoscopy  - Did require blood transfusion to maintain adequate hemoglobin levels    As we need to preserve the integrity of the stent, we need to maintain aspirin and Plavix for a total of 1 month  - We will then transition to aspirin and Eliquis over 6 months (This will start July 16th as long as there are no complications)  - Wente to monitor for any further bleeding episodes as we need to maintain the blood thinners recommended by cardiology    You did receive hemodialysis as scheduled    Follow-up with primary care clinic in 1-2 weeks        I spent > 30 minutes in the coordination of care    Wili Parmar MD  6/19/2024      Electronically signed by Wili Parmar MD on 6/19/2024  5:40 AM         REVIEWER COMMENTS

## 2024-06-20 ENCOUNTER — TELEPHONE (OUTPATIENT)
Dept: INTERNAL MEDICINE CLINIC | Facility: CLINIC | Age: 79
End: 2024-06-20

## 2024-06-20 ENCOUNTER — PATIENT OUTREACH (OUTPATIENT)
Dept: CASE MANAGEMENT | Age: 79
End: 2024-06-20

## 2024-06-20 NOTE — PROGRESS NOTES
Initial Post Discharge Follow Up   Discharge Date: 6/18/24  Contact Date: 6/19/2024    Consent Verification:  Assessment Completed With: Patient  HIPAA Verified?  Yes    Discharge Dx:   Principal Problem:    Rectal bleeding  Active Problems:    Mixed hyperlipidemia    Pulmonary HTN (HCC)    KRAIG (obstructive sleep apnea)    Type 2 diabetes mellitus with chronic kidney disease on chronic dialysis, with long-term current use of insulin (HCC)    Chronic obstructive asthma (HCC)    Primary hypertension    ESRD (end stage renal disease) on dialysis (HCC)    Anticoagulated    Antiplatelet or antithrombotic long-term use    General:   How have you been since your discharge from the hospital? Patient states that he is better, yesterday my energy was low. Patient denies fever/chills, no dizziness, patient states he does have some shortness of breath with activity and due to his asthma,   Do you have any pain since discharge?  Yes  Where: chronic neck, back, fingers, feet etc   Rating on pain scale 1-10, 10 being the worst pain you have ever experienced, what is current pain: not rated.  Alleviating factors: pain medication if needed.  Aggravating factors: it depends  Is the pain manageable at home? Yes  When you were leaving the hospital were your discharge instructions reviewed with you? Yes  How well were your discharge instructions explained to you?   On a scale of 1-5   1- Very Poor and 5- Very well   Very Well  Do you have any questions about your discharge instructions?  No  Before leaving the hospital was your diagnoses explained to you? Yes  Do you have any questions about your diagnoses? No  Are you able to perform normal daily activities of living as you have prior to your hospital stay (dressing, bathing, ambulating to the bathroom, etc)? yes  If No, What are some barriers or concerns?  Sometimes I need assistance, most of the time I can do my   (NCM) Was patient given a different diet per AVS? no, patient states  that he follows a low sodium diet as he was told to do.      Medications:   Current Outpatient Medications   Medication Sig Dispense Refill    aspirin 81 MG Oral Tab EC Take 1 tablet (81 mg total) by mouth daily. 90 tablet 3    simethicone 80 MG Oral Chew Tab Chew 1 tablet (80 mg total) by mouth 4 (four) times daily with meals and nightly. 80 tablet 0    linaGLIPtin (TRADJENTA) 5 mg Oral Tab Take 1 tablet (5 mg total) by mouth daily. 90 tablet 1    hydrALAZINE 25 MG Oral Tab Take 1 tablet (25 mg total) by mouth every 8 (eight) hours. 90 tablet 1    clopidogrel 75 MG Oral Tab Take 1 tablet (75 mg total) by mouth daily. 90 tablet 1    escitalopram 5 MG Oral Tab Take 1 tablet (5 mg total) by mouth daily. 90 tablet 3    carvedilol 25 MG Oral Tab Take 1 tablet (25 mg total) by mouth 2 (two) times daily.      atorvastatin 40 MG Oral Tab Take 1 tablet (40 mg total) by mouth nightly. 90 tablet 3    apixaban 5 MG Oral Tab Take 1 tablet (5 mg total) by mouth 2 (two) times daily.      acetaminophen 500 MG Oral Tab Take 1 tablet (500 mg total) by mouth daily as needed for Pain.      cetirizine 10 MG Oral Tab Take 1 tablet (10 mg total) by mouth every other day.      Cholecalciferol 125 MCG (5000 UT) Oral Tab Take 1 tablet (5,000 Units total) by mouth 2 (two) times daily.      polyethylene glycol, PEG 3350, 17 g Oral Powd Pack 17 g Wed, Thurs, Sat      tetrahydrozoline 0.05 % Ophthalmic Solution 1 drop 2 (two) times daily as needed.      traMADol 50 MG Oral Tab Take 1 tablet (50 mg total) by mouth every 12 (twelve) hours as needed for Pain.      Glucose Blood (CONTOUR NEXT TEST) In Vitro Strip Test 3 times daily 300 each 1    felodipine ER 10 MG Oral Tablet 24 Hr Take 1 tablet (10 mg total) by mouth daily. 90 tablet 3    fluticasone propionate 50 MCG/ACT Nasal Suspension 2 sprays by Nasal route daily. 48 g 3    pantoprazole 40 MG Oral Tab EC Take 1 tablet (40 mg total) by mouth every morning before breakfast. 90 tablet 3     methocarbamol 500 MG Oral Tab Take 1 tablet (500 mg total) by mouth 2 (two) times daily as needed. 60 tablet 1    Insulin Pen Needle (PEN NEEDLES) 32G X 4 MM Does not apply Misc 1 each daily. 100 each 0    albuterol (PROAIR HFA) 108 (90 Base) MCG/ACT Inhalation Aero Soln Inhale 2 puffs into the lungs every 4 (four) hours as needed for Wheezing. 3 each 3    Budesonide-Formoterol Fumarate (SYMBICORT) 160-4.5 MCG/ACT Inhalation Aerosol Inhale 2 puffs into the lungs 2 (two) times daily. (Patient taking differently: Inhale 2 puffs into the lungs 2 (two) times daily as needed.) 3 each 3    Darbepoetin Randell 60 MCG/ML Injection Solution Inject into the vein as needed.       Were there any changes to your current medication(s) noted on the AVS? Yes  If so, were these medication changes discussed with you prior to leaving the hospital? Yes  If a new medication was prescribed:    Was the new medication's purpose & side effects reviewed? Yes  Do you have any questions about your new medication? No  Let's go over your medications together to make sure we are not missing anything. Patient Declined, explainPatient reports he is taking all his medications as directed and did not want to review them again.  Are you having any concerns with constipation? No      Discharge medications reviewed/discussed/and reconciled against outpatient medications with patient.  Any changes or updates to medications sent to PCP.  Patient Acknowledged     Referrals/orders at D/C:  Referrals/orders placed at D/C? no    DME ordered at D/C? No      Discharge orders, AVS reviewed and discussed with patient. Any changes or updates to orders sent to PCP.  Patient Acknowledged      SDOH:   Transportation:    Transportation Needs: No Transportation Needs (6/13/2024)    Transportation Needs     Lack of Transportation: No     Car Seat: Not on file       Financial Strain:    Financial Resource Strain: Low Risk  (5/23/2024)    Financial Resource Strain      Difficulty of Paying Living Expenses: Not hard at all     Med Affordability: No        Diagnosis specifics:   Notify physician if you experience any of the following:  persistent N/V  severe uncontrolled pain  redness, tenderness, or signs of infection (pain, swelling, redness, odor or green/yellow discharge around incision site)  difficulty breathing, headache or visual disturbances  hives  persistent dizziness or light-headedness  extreme fatigue  Notify of Temp 100.4 or greater  You are seen in the hospital due to recurrent gastrointestinal bleeding while on blood thinning medication.   You were evaluated by gastroenterology with a second intervention on the known colonic ulcer. Internal hemorrhoids were also noted.   There was no active bleeding at the time of repeat colonoscopy  - Did require blood transfusion to maintain adequate hemoglobin levels As we need to preserve the integrity of the stent, we need to maintain aspirin and Plavix for a total of 1 month  - We will then transition to aspirin and Eliquis over 6 months (This will start July 16th as long as there are no complications)  - We need to monitor for any further bleeding episodes as we need to maintain the blood thinners recommended by cardiology    You did receive hemodialysis as scheduled    Follow-up with primary care clinic in 1-2 weeks    Follow up appointments:      Your appointments       Date & Time Appointment Department ([place])    Jun 27, 2024 2:00 PM CDT MA Supervisit with Wili Parmar MD [facility] ([facility])        Aug 20, 2024 9:45 AM CDT Follow Up Visit with Pushpa Sevilla MD [facility] ([facility])              [address] [address]  [phone] Critical access hospital  [facility]  [address], [address]  [facility] [address]  [phone]            TCC  Was TCC ordered: No      PCP (If no TCC appointment)  Does patient already have a PCP appointment scheduled? Yes, however it is for a MASV  NCM Attempted to schedule PCP office HFU non-TCM appointment with patient   If no appointment scheduled: Explain, Ollie states he will call Dr Parmar to see if he can have his HFU the same day as his MASV. Message sent to MD's office.     Specialist    Does the patient have any other follow up appointment(s) needing to be scheduled? Yes, pt needs appointment with NATAN Odonnell  If yes: NCM reviewed upcoming specialist appointment with patient: Yes  Does the patient need assistance scheduling appointment(s): Yes, message sent to TST team    Is there any reason as to why you cannot make your appointment(s)?  No     Needs post D/C:   Now that you are home, are there any needs or concerns you need addressed before your next visit with your PCP?  (DME, meds, questions, etc.): No    Interventions by NCM:   NCM reviewed medications, discharge instructions, S&S of infection/blood clots. Patient instructed to report any new or worsening symptoms, when to call the doctor and when to call 911. Patient verbalized understanding of these. NCM instructed patient to call PCP with any questions or needs, she states she will.      CCM referral placed:    Yes    BOOK BY DATE: 7/2/2024

## 2024-06-20 NOTE — TELEPHONE ENCOUNTER
FYI, This patient was initially admitted 5/21/24 and has been readmitted twice since then. Please assist with scheduling a HFU Non-TCM appointment.    Spoke to patient for TCM today.  Patient has a Medicare annual super visit on 6/27/24.  HFU non-TCM appointment recommended by 6/25/2024 as patient is a High risk for readmission.  Please advise.    [facility] Attempted to schedule PCP office HFU non-TCM appointment with patient, Ollie states he will call Dr Parmar to see if he can have his HFU the same day as his MASV. Message sent to MD's office.     BOOK BY DATE (last date for TCM): 7/2/2024    Clinical staff:  Please discuss with Dr Parmar to advise. Then please follow-up with patient and try to get him to schedule an HFU Non-TCM appointment.      Thank you!

## 2024-06-20 NOTE — TELEPHONE ENCOUNTER
To Dr. RICHEY:    Ok for hospital follow up and Medical Annual visit on 6/27? Or change to just TCM?  Please advise.

## 2024-06-20 NOTE — PROGRESS NOTES
Please assist patient with scheduling an appointment with specialist.  Thank You    Schedule an appointment with Carlyn Storey as soon as  possible for a visit in 2 week(s)  Specialty: GASTROENTEROLOGY  [facility]  [address]  [address]  [place] [address] [phone]

## 2024-06-21 NOTE — PROGRESS NOTES
TCM reached out for scheduling assistance.    Carlyn Storey  Specialty: GASTROENTEROLOGY  [facility]  [address]  [address]  [facility] [id number] [phone]  2 weeks    Patient's spouse states the patient already has an appointment.  Confirmed with patient's spouse.    Closing encounter.

## 2024-06-27 ENCOUNTER — OFFICE VISIT (OUTPATIENT)
Dept: INTERNAL MEDICINE CLINIC | Facility: CLINIC | Age: 79
End: 2024-06-27

## 2024-06-27 VITALS
BODY MASS INDEX: 27.83 KG/M2 | HEIGHT: 64 IN | DIASTOLIC BLOOD PRESSURE: 40 MMHG | SYSTOLIC BLOOD PRESSURE: 130 MMHG | OXYGEN SATURATION: 98 % | WEIGHT: 163 LBS | HEART RATE: 61 BPM | TEMPERATURE: 98 F

## 2024-06-27 DIAGNOSIS — R42 DYSEQUILIBRIUM: ICD-10-CM

## 2024-06-27 DIAGNOSIS — E11.21 TYPE 2 DIABETES MELLITUS WITH DIABETIC NEPHROPATHY, WITH LONG-TERM CURRENT USE OF INSULIN (HCC): ICD-10-CM

## 2024-06-27 DIAGNOSIS — Z13.29 SCREENING FOR THYROID DISORDER: ICD-10-CM

## 2024-06-27 DIAGNOSIS — M54.12 CERVICAL RADICULOPATHY: ICD-10-CM

## 2024-06-27 DIAGNOSIS — I10 PRIMARY HYPERTENSION: ICD-10-CM

## 2024-06-27 DIAGNOSIS — E53.8 VITAMIN B12 DEFICIENCY: ICD-10-CM

## 2024-06-27 DIAGNOSIS — Z00.00 ENCOUNTER FOR ANNUAL HEALTH EXAMINATION: ICD-10-CM

## 2024-06-27 DIAGNOSIS — E55.9 VITAMIN D DEFICIENCY: ICD-10-CM

## 2024-06-27 DIAGNOSIS — Z09 HOSPITAL DISCHARGE FOLLOW-UP: ICD-10-CM

## 2024-06-27 DIAGNOSIS — G47.33 OSA ON CPAP: ICD-10-CM

## 2024-06-27 DIAGNOSIS — E11.40 TYPE 2 DIABETES MELLITUS WITH DIABETIC NEUROPATHY, WITH LONG-TERM CURRENT USE OF INSULIN (HCC): ICD-10-CM

## 2024-06-27 DIAGNOSIS — Z13.0 SCREENING FOR DEFICIENCY ANEMIA: ICD-10-CM

## 2024-06-27 DIAGNOSIS — I48.0 PAROXYSMAL ATRIAL FIBRILLATION (HCC): ICD-10-CM

## 2024-06-27 DIAGNOSIS — Z79.4 TYPE 2 DIABETES MELLITUS WITH DIABETIC NEUROPATHY, WITH LONG-TERM CURRENT USE OF INSULIN (HCC): ICD-10-CM

## 2024-06-27 DIAGNOSIS — M10.9 GOUT, UNSPECIFIED CAUSE, UNSPECIFIED CHRONICITY, UNSPECIFIED SITE: ICD-10-CM

## 2024-06-27 DIAGNOSIS — Z00.00 ANNUAL PHYSICAL EXAM: Primary | ICD-10-CM

## 2024-06-27 DIAGNOSIS — I10 ESSENTIAL HYPERTENSION: ICD-10-CM

## 2024-06-27 DIAGNOSIS — I50.32 CHRONIC DIASTOLIC CONGESTIVE HEART FAILURE (HCC): ICD-10-CM

## 2024-06-27 DIAGNOSIS — E78.2 MIXED HYPERLIPIDEMIA: ICD-10-CM

## 2024-06-27 DIAGNOSIS — N18.5 ANEMIA OF CHRONIC RENAL FAILURE, STAGE 5 (HCC): ICD-10-CM

## 2024-06-27 DIAGNOSIS — Z12.5 SCREENING FOR PROSTATE CANCER: ICD-10-CM

## 2024-06-27 DIAGNOSIS — D63.1 ANEMIA OF CHRONIC RENAL FAILURE, STAGE 5 (HCC): ICD-10-CM

## 2024-06-27 DIAGNOSIS — Z79.4 TYPE 2 DIABETES MELLITUS WITH DIABETIC NEPHROPATHY, WITH LONG-TERM CURRENT USE OF INSULIN (HCC): ICD-10-CM

## 2024-06-27 DIAGNOSIS — R13.19 OTHER DYSPHAGIA: ICD-10-CM

## 2024-06-27 PROCEDURE — 3078F DIAST BP <80 MM HG: CPT | Performed by: INTERNAL MEDICINE

## 2024-06-27 PROCEDURE — 99499 UNLISTED E&M SERVICE: CPT | Performed by: INTERNAL MEDICINE

## 2024-06-27 PROCEDURE — 1159F MED LIST DOCD IN RCRD: CPT | Performed by: INTERNAL MEDICINE

## 2024-06-27 PROCEDURE — 1125F AMNT PAIN NOTED PAIN PRSNT: CPT | Performed by: INTERNAL MEDICINE

## 2024-06-27 PROCEDURE — 3008F BODY MASS INDEX DOCD: CPT | Performed by: INTERNAL MEDICINE

## 2024-06-27 PROCEDURE — 3075F SYST BP GE 130 - 139MM HG: CPT | Performed by: INTERNAL MEDICINE

## 2024-06-27 PROCEDURE — G0439 PPPS, SUBSEQ VISIT: HCPCS | Performed by: INTERNAL MEDICINE

## 2024-06-27 PROCEDURE — 96160 PT-FOCUSED HLTH RISK ASSMT: CPT | Performed by: INTERNAL MEDICINE

## 2024-06-27 PROCEDURE — 1111F DSCHRG MED/CURRENT MED MERGE: CPT | Performed by: INTERNAL MEDICINE

## 2024-06-27 PROCEDURE — 1170F FXNL STATUS ASSESSED: CPT | Performed by: INTERNAL MEDICINE

## 2024-06-27 PROCEDURE — 1160F RVW MEDS BY RX/DR IN RCRD: CPT | Performed by: INTERNAL MEDICINE

## 2024-06-27 NOTE — PATIENT INSTRUCTIONS
- You were seen in clinic for regular annual check-up. We have ordered labs for you and we will call you with the results. Please obtain the bloodwork fasting for 10 hours. OK to drink water the day of your blood draw.      We have the lab downstairs on the first floor.  No appointment is necessary.  Lab hours are Monday-Friday 7:30 AM to 4:45 PM.  There may be Saturday hours 7 AM-11:00 AM as well.     Otherwise you can obtain the blood tests on the weekends at the main hospital or local immediate care/urgent care within the [facility].    -We did review your recent hospital follow-up.  We are happy to hear that the bleeding has resolved.  We have resumed usual blood thinning medications recommended by cardiology:    On her last visit with Dr. Phelan in the hospital, cardiology had recommended:  - Aspirin and Plavix only (No Eliquis) for 1 month total.  - Starting on July 16, he will then only take Aspirin and Eliquis    - Follow-up with Dr. Storey gastroenterology.  We can try a probiotic over-the-counter.  No specific brand.  We can decrease the simethicone to once a day especially if the symptoms have not been controlled on this medication.  Sometimes this can result in constipation    Hold off on MiraLAX, Metamucil until seen by gastroenterology    - Continue with hemodialysis as scheduled    -Please continue checking your blood pressures at home and notify us if they are increasing   - please continue checking your blood sugars at home and notify us if they are increasing  - Please continue following up with ophthalmology as you are due for diabetic eye exam  - Vaccines may be due for: COVID dose #8  - Please continue to eat a varied diet including recommended servings of vegetables, fruits, and low fat dairy. Minimize high saturated fats (such as fast foods) and high sugar intake (such as soda)  - We recommend 150 minutes of moderate intensity exercise (brisk walking, swimming) weekly to maintain your  current weight.  Targeted weight loss will require more vigorous exercise or more than 150 minutes/week.    Return to clinic in 4 months for follow-up    Ollie Hernández's SCREENING SCHEDULE   Tests on this list are recommended by your physician but may not be covered, or covered at this frequency, by your insurer.   Please check with your insurance carrier before scheduling to verify coverage.   PREVENTATIVE SERVICES FREQUENCY &  COVERAGE DETAILS LAST COMPLETION DATE   Diabetes Screening    Fasting Blood Sugar / Glucose    One screening every 12 months if never tested or if previously tested but not diagnosed with pre-diabetes   One screening every 6 months if diagnosed with pre-diabetes Lab Results   Component Value Date     (H) 06/18/2024        Cardiovascular Disease Screening    Lipid Panel  Cholesterol  Lipoprotein (HDL)  Triglycerides Covered every 5 years for all Medicare beneficiaries without apparent signs or symptoms of cardiovascular disease Lab Results   Component Value Date    CHOLEST 170 06/23/2023    HDL 43 06/23/2023     (H) 06/23/2023    LDLD 161 (H) 10/26/2021    TRIG 132 06/23/2023         Electrocardiogram (EKG)   Covered if needed at Welcome to Medicare, and non-screening if indicated for medical reasons 05/21/2024      Ultrasound Screening for Abdominal Aortic Aneurysm (AAA) Covered once in a lifetime for one of the following risk factors    Men who are 65-75 years old and have ever smoked    Anyone with a family history -     Colorectal Cancer Screening  Covered for ages 50-85; only need ONE of the following:    Colonoscopy   Covered every 10 years    Covered every 2 years if patient is at high risk or previous colonoscopy was abnormal 06/15/2024    Health Maintenance   Topic Date Due    Colorectal Cancer Screening  06/15/2031       Flexible Sigmoidoscopy   Covered every 4 years -    Fecal Occult Blood Test Covered annually -   Prostate Cancer Screening    Prostate-Specific  Antigen (PSA) Annually Lab Results   Component Value Date    PSA 2.94 10/20/2021     There are no preventive care reminders to display for this patient.   Immunizations    Influenza Covered once per flu season  Please get every year 10/26/2023  No recommendations at this time    Pneumococcal Each vaccine (Hvubodc65 & Uqeuskpux39) covered once after 65 Prevnar 13: 06/15/2017    Gbexknvsl99: 07/10/2018     No recommendations at this time    Hepatitis B One screening covered for patients with certain risk factors   -  No recommendations at this time    Tetanus Toxoid Not covered by Medicare Part B unless medically necessary (cut with metal); may be covered with your pharmacy prescription benefits -    Tetanus, Diptheria and Pertusis TD and TDaP Not covered by Medicare Part B -  No recommendations at this time    Zoster Not covered by Medicare Part B; may be covered with your pharmacy  prescription benefits -  No recommendations at this time     Diabetes      Hemoglobin A1C Annually; if last result is elevated, may be asked to retest more frequently.    Medicare covers every 3 months Lab Results   Component Value Date     (H) 06/23/2023    A1C 5.3 04/02/2024       No recommendations at this time    Creat/alb ratio Annually Lab Results   Component Value Date    MICROALBCREA 1,203.8 (H) 06/23/2023       LDL Annually Lab Results   Component Value Date     (H) 06/23/2023       Dilated Eye Exam Annually Last Diabetic Eye Exam:  No data recorded  No data recorded       Chronic Obstructive Pulmonary Disease (COPD)    Spirometry Annually Spirometry date:

## 2024-07-01 ENCOUNTER — TELEPHONE (OUTPATIENT)
Dept: INTERNAL MEDICINE CLINIC | Facility: CLINIC | Age: 79
End: 2024-07-01

## 2024-07-01 NOTE — TELEPHONE ENCOUNTER
Called patient and spoke to patient's wife, Stephy (Verified HIPAA),regarding Primary Care Physician listed with patient's insurance.     Advised wife to call insurance to update Primary Care Physician Information.     Stephy stated she would call insurance today.

## 2024-07-04 ENCOUNTER — LAB ENCOUNTER (OUTPATIENT)
Dept: LAB | Facility: HOSPITAL | Age: 79
End: 2024-07-04
Attending: INTERNAL MEDICINE
Payer: MEDICARE

## 2024-07-04 DIAGNOSIS — Z13.29 SCREENING FOR THYROID DISORDER: ICD-10-CM

## 2024-07-04 DIAGNOSIS — Z00.00 ANNUAL PHYSICAL EXAM: ICD-10-CM

## 2024-07-04 DIAGNOSIS — N18.5 ANEMIA OF CHRONIC RENAL FAILURE, STAGE 5 (HCC): ICD-10-CM

## 2024-07-04 DIAGNOSIS — Z12.5 SCREENING FOR PROSTATE CANCER: ICD-10-CM

## 2024-07-04 DIAGNOSIS — E53.8 VITAMIN B12 DEFICIENCY: ICD-10-CM

## 2024-07-04 DIAGNOSIS — Z79.4 TYPE 2 DIABETES MELLITUS WITH DIABETIC NEUROPATHY, WITH LONG-TERM CURRENT USE OF INSULIN (HCC): ICD-10-CM

## 2024-07-04 DIAGNOSIS — R42 DYSEQUILIBRIUM: ICD-10-CM

## 2024-07-04 DIAGNOSIS — M10.9 GOUT, UNSPECIFIED CAUSE, UNSPECIFIED CHRONICITY, UNSPECIFIED SITE: ICD-10-CM

## 2024-07-04 DIAGNOSIS — E78.2 MIXED HYPERLIPIDEMIA: ICD-10-CM

## 2024-07-04 DIAGNOSIS — D50.0 ANEMIA, BLOOD LOSS: ICD-10-CM

## 2024-07-04 DIAGNOSIS — D64.9 CHRONIC ANEMIA: ICD-10-CM

## 2024-07-04 DIAGNOSIS — E11.21 TYPE 2 DIABETES MELLITUS WITH DIABETIC NEPHROPATHY, WITH LONG-TERM CURRENT USE OF INSULIN (HCC): ICD-10-CM

## 2024-07-04 DIAGNOSIS — E11.40 TYPE 2 DIABETES MELLITUS WITH DIABETIC NEUROPATHY, WITH LONG-TERM CURRENT USE OF INSULIN (HCC): ICD-10-CM

## 2024-07-04 DIAGNOSIS — D63.1 ANEMIA OF CHRONIC RENAL FAILURE, STAGE 5 (HCC): ICD-10-CM

## 2024-07-04 DIAGNOSIS — Z79.4 TYPE 2 DIABETES MELLITUS WITH DIABETIC NEPHROPATHY, WITH LONG-TERM CURRENT USE OF INSULIN (HCC): ICD-10-CM

## 2024-07-04 DIAGNOSIS — E55.9 VITAMIN D DEFICIENCY: ICD-10-CM

## 2024-07-04 LAB
ALBUMIN SERPL-MCNC: 3.9 G/DL (ref 3.2–4.8)
ALBUMIN/GLOB SERPL: 1.3 {RATIO} (ref 1–2)
ALP LIVER SERPL-CCNC: 61 U/L
ALT SERPL-CCNC: 24 U/L
ANION GAP SERPL CALC-SCNC: 8 MMOL/L (ref 0–18)
AST SERPL-CCNC: 24 U/L (ref ?–34)
BASOPHILS # BLD AUTO: 0.04 X10(3) UL (ref 0–0.2)
BASOPHILS NFR BLD AUTO: 0.7 %
BILIRUB SERPL-MCNC: 0.5 MG/DL (ref 0.2–1.1)
BUN BLD-MCNC: 37 MG/DL (ref 9–23)
BUN/CREAT SERPL: 8.7 (ref 10–20)
CALCIUM BLD-MCNC: 9.9 MG/DL (ref 8.7–10.4)
CHLORIDE SERPL-SCNC: 104 MMOL/L (ref 98–112)
CHOLEST SERPL-MCNC: 153 MG/DL (ref ?–200)
CO2 SERPL-SCNC: 30 MMOL/L (ref 21–32)
COMPLEXED PSA SERPL-MCNC: 2.72 NG/ML (ref ?–4)
CREAT BLD-MCNC: 4.25 MG/DL
DEPRECATED HBV CORE AB SER IA-ACNC: 641.4 NG/ML
DEPRECATED RDW RBC AUTO: 73.3 FL (ref 35.1–46.3)
EGFRCR SERPLBLD CKD-EPI 2021: 14 ML/MIN/1.73M2 (ref 60–?)
EOSINOPHIL # BLD AUTO: 0.16 X10(3) UL (ref 0–0.7)
EOSINOPHIL NFR BLD AUTO: 3 %
ERYTHROCYTE [DISTWIDTH] IN BLOOD BY AUTOMATED COUNT: 20 % (ref 11–15)
EST. AVERAGE GLUCOSE BLD GHB EST-MCNC: 88 MG/DL (ref 68–126)
FASTING PATIENT LIPID ANSWER: NO
FASTING STATUS PATIENT QL REPORTED: NO
GLOBULIN PLAS-MCNC: 3.1 G/DL (ref 2–3.5)
GLUCOSE BLD-MCNC: 123 MG/DL (ref 70–99)
HBA1C MFR BLD: 4.7 % (ref ?–5.7)
HCT VFR BLD AUTO: 34 %
HDLC SERPL-MCNC: 41 MG/DL (ref 40–59)
HGB BLD-MCNC: 10.5 G/DL
IMM GRANULOCYTES # BLD AUTO: 0.02 X10(3) UL (ref 0–1)
IMM GRANULOCYTES NFR BLD: 0.4 %
IRON SATN MFR SERPL: 28 %
IRON SERPL-MCNC: 61 UG/DL
LDLC SERPL CALC-MCNC: 100 MG/DL (ref ?–100)
LYMPHOCYTES # BLD AUTO: 1.03 X10(3) UL (ref 1–4)
LYMPHOCYTES NFR BLD AUTO: 19 %
MCH RBC QN AUTO: 30.9 PG (ref 26–34)
MCHC RBC AUTO-ENTMCNC: 30.9 G/DL (ref 31–37)
MCV RBC AUTO: 100 FL
MONOCYTES # BLD AUTO: 0.46 X10(3) UL (ref 0.1–1)
MONOCYTES NFR BLD AUTO: 8.5 %
NEUTROPHILS # BLD AUTO: 3.71 X10 (3) UL (ref 1.5–7.7)
NEUTROPHILS # BLD AUTO: 3.71 X10(3) UL (ref 1.5–7.7)
NEUTROPHILS NFR BLD AUTO: 68.4 %
NONHDLC SERPL-MCNC: 112 MG/DL (ref ?–130)
OSMOLALITY SERPL CALC.SUM OF ELEC: 304 MOSM/KG (ref 275–295)
PLATELET # BLD AUTO: 199 10(3)UL (ref 150–450)
PLATELET MORPHOLOGY: NORMAL
POTASSIUM SERPL-SCNC: 4.4 MMOL/L (ref 3.5–5.1)
PROT SERPL-MCNC: 7 G/DL (ref 5.7–8.2)
RBC # BLD AUTO: 3.4 X10(6)UL
SODIUM SERPL-SCNC: 142 MMOL/L (ref 136–145)
TIBC SERPL-MCNC: 216 UG/DL (ref 250–425)
TRANSFERRIN SERPL-MCNC: 145 MG/DL (ref 215–365)
TRIGL SERPL-MCNC: 60 MG/DL (ref 30–149)
TSI SER-ACNC: 2.84 MIU/ML (ref 0.55–4.78)
URATE SERPL-MCNC: 4.4 MG/DL
VIT B12 SERPL-MCNC: 672 PG/ML (ref 211–911)
VIT D+METAB SERPL-MCNC: 75.6 NG/ML (ref 30–100)
VLDLC SERPL CALC-MCNC: 10 MG/DL (ref 0–30)
WBC # BLD AUTO: 5.4 X10(3) UL (ref 4–11)

## 2024-07-04 PROCEDURE — 84550 ASSAY OF BLOOD/URIC ACID: CPT

## 2024-07-04 PROCEDURE — 82607 VITAMIN B-12: CPT

## 2024-07-04 PROCEDURE — 36415 COLL VENOUS BLD VENIPUNCTURE: CPT

## 2024-07-04 PROCEDURE — 80061 LIPID PANEL: CPT

## 2024-07-04 PROCEDURE — 84466 ASSAY OF TRANSFERRIN: CPT

## 2024-07-04 PROCEDURE — 80053 COMPREHEN METABOLIC PANEL: CPT

## 2024-07-04 PROCEDURE — 82306 VITAMIN D 25 HYDROXY: CPT

## 2024-07-04 PROCEDURE — 85025 COMPLETE CBC W/AUTO DIFF WBC: CPT

## 2024-07-04 PROCEDURE — 83540 ASSAY OF IRON: CPT

## 2024-07-04 PROCEDURE — 82728 ASSAY OF FERRITIN: CPT

## 2024-07-04 PROCEDURE — 83036 HEMOGLOBIN GLYCOSYLATED A1C: CPT

## 2024-07-04 PROCEDURE — 84443 ASSAY THYROID STIM HORMONE: CPT

## 2024-07-15 ENCOUNTER — TELEPHONE (OUTPATIENT)
Dept: INTERNAL MEDICINE CLINIC | Facility: CLINIC | Age: 79
End: 2024-07-15

## 2024-07-15 ENCOUNTER — TELEPHONE (OUTPATIENT)
Facility: CLINIC | Age: 79
End: 2024-07-15

## 2024-07-15 NOTE — TELEPHONE ENCOUNTER
Rn spoke to patient reported is a hemodialysis patient reported had dialysis today and feels like they taking  to much out that pt is feeling tired the whole time ,already informed the nurse at [facility] ,Rn called Kerrie from [facility] and informed of patient concerned stated will call patient.

## 2024-07-15 NOTE — TELEPHONE ENCOUNTER
Patient requesting to speak with Nephrologist regarding Dialysis.  Please call - aware Nephrologist is out of office.  Thank you.

## 2024-07-15 NOTE — TELEPHONE ENCOUNTER
Called Ollie - discussed that we can stop plavix tomorrow, resume ASA + Eliquis as per Cardiology on last hospitalization    He feels very worn down.  No bleeding episodes.  He is hoping that they can adjust his dialysis settings, but did reach out to nephrology.  If needed, can see me sooner for evaluation if still no better.

## 2024-07-15 NOTE — TELEPHONE ENCOUNTER
Patient requests call back from Dr Parmar re:his dialysis; medication/plavix    Patient states it is very important    Home# [phone] or cell#[phone]

## 2024-07-16 RX ORDER — PANTOPRAZOLE SODIUM 40 MG/1
40 TABLET, DELAYED RELEASE ORAL
Qty: 90 TABLET | Refills: 3 | Status: SHIPPED | OUTPATIENT
Start: 2024-07-16

## 2024-07-16 NOTE — TELEPHONE ENCOUNTER
Refill request is for a maintenance medication and has met the criteria specified in the Ambulatory Medication Refill Standing Order for eligibility, visits, laboratory, alerts and was sent to the requested pharmacy.    Requested Prescriptions     Signed Prescriptions Disp Refills    PANTOPRAZOLE 40 MG Oral Tab EC 90 tablet 3     Sig: TAKE 1 TABLET BY MOUTH EVERY  MORNING BEFORE BREAKFAST     Authorizing Provider: CAROLINE SAMUEL     Ordering User: HALEY LEACH

## 2024-07-17 ENCOUNTER — TELEPHONE (OUTPATIENT)
Dept: ENDOCRINOLOGY CLINIC | Facility: CLINIC | Age: 79
End: 2024-07-17

## 2024-07-17 NOTE — TELEPHONE ENCOUNTER
Received fax from [facility], [facility] attached is a fax for certification. Fax was filled out and placed in provider folder for review and signature.

## 2024-07-24 ENCOUNTER — TELEPHONE (OUTPATIENT)
Dept: INTERNAL MEDICINE CLINIC | Facility: CLINIC | Age: 79
End: 2024-07-24

## 2024-07-24 RX ORDER — LINAGLIPTIN 5 MG/1
5 TABLET, FILM COATED ORAL DAILY
Qty: 90 TABLET | Refills: 1 | Status: CANCELLED | OUTPATIENT
Start: 2024-07-24

## 2024-07-24 RX ORDER — ESCITALOPRAM OXALATE 5 MG/1
5 TABLET ORAL DAILY
Qty: 90 TABLET | Refills: 3 | Status: CANCELLED | OUTPATIENT
Start: 2024-07-24

## 2024-07-24 RX ORDER — FELODIPINE 10 MG/1
10 TABLET, EXTENDED RELEASE ORAL DAILY
Qty: 90 TABLET | Refills: 3 | Status: CANCELLED | OUTPATIENT
Start: 2024-07-24

## 2024-07-24 RX ORDER — PANTOPRAZOLE SODIUM 40 MG/1
40 TABLET, DELAYED RELEASE ORAL
Qty: 90 TABLET | Refills: 3 | Status: CANCELLED | OUTPATIENT
Start: 2024-07-24

## 2024-07-30 ENCOUNTER — HOSPITAL ENCOUNTER (INPATIENT)
Facility: HOSPITAL | Age: 79
LOS: 2 days | Discharge: HOME HEALTH | End: 2024-08-01
Attending: EMERGENCY MEDICINE | Admitting: INTERNAL MEDICINE
Payer: MEDICARE

## 2024-07-30 ENCOUNTER — APPOINTMENT (OUTPATIENT)
Dept: CT IMAGING | Facility: HOSPITAL | Age: 79
End: 2024-07-30
Attending: INTERNAL MEDICINE
Payer: MEDICARE

## 2024-07-30 DIAGNOSIS — K92.2 LOWER GI BLEEDING: Primary | ICD-10-CM

## 2024-07-30 DIAGNOSIS — N18.6 ESRD ON HEMODIALYSIS (HCC): ICD-10-CM

## 2024-07-30 DIAGNOSIS — Z99.2 ESRD ON HEMODIALYSIS (HCC): ICD-10-CM

## 2024-07-30 LAB
ALBUMIN SERPL-MCNC: 3.5 G/DL (ref 3.2–4.8)
ALBUMIN/GLOB SERPL: 1.2 {RATIO} (ref 1–2)
ALP LIVER SERPL-CCNC: 66 U/L
ALT SERPL-CCNC: 33 U/L
ANION GAP SERPL CALC-SCNC: 9 MMOL/L (ref 0–18)
ANTIBODY SCREEN: NEGATIVE
AST SERPL-CCNC: 29 U/L (ref ?–34)
ATRIAL RATE: 104 BPM
BASOPHILS # BLD AUTO: 0.04 X10(3) UL (ref 0–0.2)
BASOPHILS NFR BLD AUTO: 0.7 %
BILIRUB SERPL-MCNC: 0.3 MG/DL (ref 0.2–1.1)
BUN BLD-MCNC: 50 MG/DL (ref 9–23)
BUN/CREAT SERPL: 10 (ref 10–20)
CALCIUM BLD-MCNC: 9.3 MG/DL (ref 8.7–10.4)
CHLORIDE SERPL-SCNC: 104 MMOL/L (ref 98–112)
CO2 SERPL-SCNC: 30 MMOL/L (ref 21–32)
CREAT BLD-MCNC: 5.01 MG/DL
DEPRECATED RDW RBC AUTO: 59.1 FL (ref 35.1–46.3)
EGFRCR SERPLBLD CKD-EPI 2021: 11 ML/MIN/1.73M2 (ref 60–?)
EOSINOPHIL # BLD AUTO: 0.14 X10(3) UL (ref 0–0.7)
EOSINOPHIL NFR BLD AUTO: 2.5 %
ERYTHROCYTE [DISTWIDTH] IN BLOOD BY AUTOMATED COUNT: 17 % (ref 11–15)
GLOBULIN PLAS-MCNC: 3 G/DL (ref 2–3.5)
GLUCOSE BLD-MCNC: 138 MG/DL (ref 70–99)
GLUCOSE BLDC GLUCOMTR-MCNC: 138 MG/DL (ref 70–99)
GLUCOSE BLDC GLUCOMTR-MCNC: 150 MG/DL (ref 70–99)
GLUCOSE BLDC GLUCOMTR-MCNC: 89 MG/DL (ref 70–99)
HCT VFR BLD AUTO: 32.8 %
HCT VFR BLD AUTO: 34.9 %
HCT VFR BLD AUTO: 35.7 %
HGB BLD-MCNC: 11.1 G/DL
HGB BLD-MCNC: 11.4 G/DL
HGB BLD-MCNC: 11.4 G/DL
IMM GRANULOCYTES # BLD AUTO: 0.03 X10(3) UL (ref 0–1)
IMM GRANULOCYTES NFR BLD: 0.5 %
LIPASE SERPL-CCNC: 224 U/L (ref 12–53)
LYMPHOCYTES # BLD AUTO: 1.09 X10(3) UL (ref 1–4)
LYMPHOCYTES NFR BLD AUTO: 19.7 %
MCH RBC QN AUTO: 30.2 PG (ref 26–34)
MCHC RBC AUTO-ENTMCNC: 31.9 G/DL (ref 31–37)
MCV RBC AUTO: 94.4 FL
MONOCYTES # BLD AUTO: 0.34 X10(3) UL (ref 0.1–1)
MONOCYTES NFR BLD AUTO: 6.1 %
NEUTROPHILS # BLD AUTO: 3.9 X10 (3) UL (ref 1.5–7.7)
NEUTROPHILS # BLD AUTO: 3.9 X10(3) UL (ref 1.5–7.7)
NEUTROPHILS NFR BLD AUTO: 70.5 %
OSMOLALITY SERPL CALC.SUM OF ELEC: 312 MOSM/KG (ref 275–295)
P AXIS: -19 DEGREES
P-R INTERVAL: 146 MS
PLATELET # BLD AUTO: 173 10(3)UL (ref 150–450)
POTASSIUM SERPL-SCNC: 4.5 MMOL/L (ref 3.5–5.1)
PROT SERPL-MCNC: 6.5 G/DL (ref 5.7–8.2)
Q-T INTERVAL: 334 MS
QRS DURATION: 72 MS
QTC CALCULATION (BEZET): 439 MS
R AXIS: 0 DEGREES
RBC # BLD AUTO: 3.78 X10(6)UL
RH BLOOD TYPE: POSITIVE
SODIUM SERPL-SCNC: 143 MMOL/L (ref 136–145)
T AXIS: 77 DEGREES
VENTRICULAR RATE: 104 BPM
WBC # BLD AUTO: 5.5 X10(3) UL (ref 4–11)

## 2024-07-30 PROCEDURE — 99223 1ST HOSP IP/OBS HIGH 75: CPT | Performed by: INTERNAL MEDICINE

## 2024-07-30 PROCEDURE — 74174 CTA ABD&PLVS W/CONTRAST: CPT | Performed by: INTERNAL MEDICINE

## 2024-07-30 RX ORDER — NICOTINE POLACRILEX 4 MG
30 LOZENGE BUCCAL
Status: DISCONTINUED | OUTPATIENT
Start: 2024-07-30 | End: 2024-08-01

## 2024-07-30 RX ORDER — HYDRALAZINE HYDROCHLORIDE 25 MG/1
25 TABLET, FILM COATED ORAL EVERY 8 HOURS SCHEDULED
Status: DISCONTINUED | OUTPATIENT
Start: 2024-07-30 | End: 2024-08-01

## 2024-07-30 RX ORDER — CARVEDILOL 25 MG/1
25 TABLET ORAL 2 TIMES DAILY
Status: DISCONTINUED | OUTPATIENT
Start: 2024-07-30 | End: 2024-08-01

## 2024-07-30 RX ORDER — ATORVASTATIN CALCIUM 40 MG/1
40 TABLET, FILM COATED ORAL NIGHTLY
Status: DISCONTINUED | OUTPATIENT
Start: 2024-07-30 | End: 2024-08-01

## 2024-07-30 RX ORDER — SODIUM CHLORIDE 9 MG/ML
INJECTION, SOLUTION INTRAVENOUS CONTINUOUS
Status: DISCONTINUED | OUTPATIENT
Start: 2024-07-30 | End: 2024-07-31

## 2024-07-30 RX ORDER — ESCITALOPRAM OXALATE 5 MG/1
5 TABLET ORAL DAILY
Status: DISCONTINUED | OUTPATIENT
Start: 2024-07-30 | End: 2024-08-01

## 2024-07-30 RX ORDER — NICOTINE POLACRILEX 4 MG
15 LOZENGE BUCCAL
Status: DISCONTINUED | OUTPATIENT
Start: 2024-07-30 | End: 2024-08-01

## 2024-07-30 RX ORDER — ALBUMIN (HUMAN) 12.5 G/50ML
25 SOLUTION INTRAVENOUS
Status: DISPENSED | OUTPATIENT
Start: 2024-07-30 | End: 2024-08-01

## 2024-07-30 RX ORDER — ONDANSETRON 2 MG/ML
4 INJECTION INTRAMUSCULAR; INTRAVENOUS EVERY 6 HOURS PRN
Status: DISCONTINUED | OUTPATIENT
Start: 2024-07-30 | End: 2024-08-01

## 2024-07-30 RX ORDER — FLUTICASONE PROPIONATE AND SALMETEROL 250; 50 UG/1; UG/1
1 POWDER RESPIRATORY (INHALATION) 2 TIMES DAILY
Status: DISCONTINUED | OUTPATIENT
Start: 2024-07-30 | End: 2024-08-01

## 2024-07-30 RX ORDER — DEXTROSE MONOHYDRATE 25 G/50ML
50 INJECTION, SOLUTION INTRAVENOUS
Status: DISCONTINUED | OUTPATIENT
Start: 2024-07-30 | End: 2024-08-01

## 2024-07-30 RX ORDER — CARVEDILOL 12.5 MG/1
12.5 TABLET ORAL 2 TIMES DAILY
Status: DISCONTINUED | OUTPATIENT
Start: 2024-07-30 | End: 2024-07-30

## 2024-07-30 RX ORDER — FLUTICASONE PROPIONATE 50 MCG
2 SPRAY, SUSPENSION (ML) NASAL DAILY
Status: DISCONTINUED | OUTPATIENT
Start: 2024-07-30 | End: 2024-08-01

## 2024-07-30 RX ORDER — TRAMADOL HYDROCHLORIDE 50 MG/1
50 TABLET ORAL EVERY 12 HOURS PRN
Status: DISCONTINUED | OUTPATIENT
Start: 2024-07-30 | End: 2024-08-01

## 2024-07-30 RX ORDER — ACETAMINOPHEN 500 MG
500 TABLET ORAL EVERY 4 HOURS PRN
Status: DISCONTINUED | OUTPATIENT
Start: 2024-07-30 | End: 2024-08-01

## 2024-07-30 NOTE — H&P
[facility]  part of [facility]  HISTORY AND PHYSICAL       Ollie Hernández Patient Status:  Emergency    1945  79 year old CSN [id number]   Location  Attending Gabriela Allen MD     PCP Wili Parmar MD         DATE OF ADMISSION: 2024     CHIEF COMPLAINT: Dark black stools    HISTORY OF PRESENT ILLNESS  This is a 79 year oldmale who presented complaining of dark black stools.  Patient stated the night prior to admission he began developing dark tarry stools.  Patient reports around 4 episodes of dark tarry stools since that time.  Of note, patient states he has had several previous admissions for similar issues.  At that time he underwent colonoscopy with clipping of active bleeding lesion.  Patient is on chronic anticoagulation with Eliquis for atrial fibrillation and is also on aspirin due to previous cardiac stent.  At time of interview, patient denies current abdominal pain, nausea or vomiting.  Patient did note still having dark tarry stools.  Denies current chest pain, shortness of breath, fevers or chills.    PAST MEDICAL HISTORY  Past Medical History:    Anemia    Asthma (HCC)    Back problem    BPH (benign prostatic hyperplasia)    Calculus of kidney    Cataract    Diabetes (HCC)    ESRD (end stage renal disease) on dialysis (HCC)    Essential hypertension    High blood pressure    High cholesterol    History of blood transfusion    Hyperlipidemia    Neuropathy    hands and feet    KRAIG on CPAP    Renal disorder    Sleep apnea    Visual impairment    glasses    Vocal cord paralysis, unilateral partial        PAST SURGICAL HISTORY  Past Surgical History:   Procedure Laterality Date    Appendectomy          Back surgery      Neck/back -     Capsule endoscopy - internal referral      Cataract      2021 and 2022    Colonoscopy      Colonoscopy N/A 2021    Procedure: COLONOSCOPY;  Surgeon: Michael Gautam MD;  Location: [facility]    Colonoscopy N/A  6/3/2024    Procedure: COLONOSCOPY;  Surgeon: Gabriel Saldana MD;  Location: [facility] ENDOSCOPY    Colonoscopy N/A 6/15/2024    Procedure: COLONOSCOPY;  Surgeon: Carlyn Storey MD;  Location: [facility] ENDOSCOPY    Hand/finger surgery unlisted      Accidental trauma    Upper gi endoscopy,diagnosis         ALLERGIES   Adhesive tape and Dust mite extract    MEDICATIONS  Patient's Medications   New Prescriptions    No medications on file   Previous Medications    ACETAMINOPHEN 500 MG ORAL TAB    Take 1 tablet (500 mg total) by mouth daily as needed for Pain.    ALBUTEROL (PROAIR HFA) 108 (90 BASE) MCG/ACT INHALATION AERO SOLN    Inhale 2 puffs into the lungs every 4 (four) hours as needed for Wheezing.    APIXABAN 5 MG ORAL TAB    Take 1 tablet (5 mg total) by mouth 2 (two) times daily.    ASPIRIN 81 MG ORAL TAB EC    Take 1 tablet (81 mg total) by mouth daily.    ATORVASTATIN 40 MG ORAL TAB    Take 1 tablet (40 mg total) by mouth nightly.    BUDESONIDE-FORMOTEROL FUMARATE (SYMBICORT) 160-4.5 MCG/ACT INHALATION AEROSOL    Inhale 2 puffs into the lungs 2 (two) times daily.    CARVEDILOL 25 MG ORAL TAB    Take 1 tablet (25 mg total) by mouth 2 (two) times daily.    CETIRIZINE 10 MG ORAL TAB    Take 1 tablet (10 mg total) by mouth every other day.    CHOLECALCIFEROL 125 MCG (5000 UT) ORAL TAB    Take 1 tablet (5,000 Units total) by mouth 2 (two) times daily.    DARBEPOETIN DENIZ 60 MCG/ML INJECTION SOLUTION    Inject into the vein as needed.    ESCITALOPRAM 5 MG ORAL TAB    Take 1 tablet (5 mg total) by mouth daily.    FELODIPINE ER 10 MG ORAL TABLET 24 HR    Take 1 tablet (10 mg total) by mouth daily.    FLUTICASONE PROPIONATE 50 MCG/ACT NASAL SUSPENSION    2 sprays by Nasal route daily.    GLUCOSE BLOOD (CONTOUR NEXT TEST) IN VITRO STRIP    Test 3 times daily    LINAGLIPTIN (TRADJENTA) 5 MG ORAL TAB    Take 1 tablet (5 mg total) by mouth daily.    METHOCARBAMOL 500 MG ORAL TAB    Take 1 tablet (500 mg total) by mouth 2 (two) times  daily as needed.    PANTOPRAZOLE 40 MG ORAL TAB EC    TAKE 1 TABLET BY MOUTH EVERY  MORNING BEFORE BREAKFAST    POLYETHYLENE GLYCOL, PEG 3350, 17 G ORAL POWD PACK    17 g Wed, Thurs, Sat    TETRAHYDROZOLINE 0.05 % OPHTHALMIC SOLUTION    1 drop 2 (two) times daily as needed.    TRAMADOL 50 MG ORAL TAB    Take 1 tablet (50 mg total) by mouth every 12 (twelve) hours as needed for Pain.   Modified Medications    No medications on file   Discontinued Medications    CLOPIDOGREL 75 MG ORAL TAB    Take 1 tablet (75 mg total) by mouth daily.    HYDRALAZINE 25 MG ORAL TAB    Take 1 tablet (25 mg total) by mouth every 8 (eight) hours.    INSULIN PEN NEEDLE (PEN NEEDLES) 32G X 4 MM DOES NOT APPLY MISC    1 each daily.    SIMETHICONE 80 MG ORAL CHEW TAB    Chew 1 tablet (80 mg total) by mouth 4 (four) times daily with meals and nightly.       SOCIAL HISTORY  Social History     Socioeconomic History    Marital status:    Tobacco Use    Smoking status: Former     Current packs/day: 0.00     Average packs/day: 1 pack/day for 17.0 years (17.0 ttl pk-yrs)     Types: Cigarettes     Start date: 1964     Quit date: 1981     Years since quittin.6    Smokeless tobacco: Never   Vaping Use    Vaping status: Never Used   Substance and Sexual Activity    Alcohol use: No     Alcohol/week: 0.0 standard drinks of alcohol    Drug use: No    Sexual activity: Yes     Partners: Female       FAMILY HISTORY  Family History   Problem Relation Age of Onset    Cancer Father         Kidney    Breast Cancer Mother     Diabetes Mother     Diabetes Maternal Grandmother     Diabetes Maternal Grandfather     Heart Disorder Other         Uncle       PHYSICAL EXAM  Vital signs:  /62   Pulse 67   Temp 98.1 °F (36.7 °C) (Oral)   Resp 18   Wt 157 lb (71.2 kg)   SpO2 100%   BMI 26.95 kg/m²      GENERAL:  Awake and alert, in no acute distress.  HEART:  Regular rhythm.  Regular rate   LUNGS:  Air entry was minimally decreased.  No  crackles or wheezes   ABDOMEN: Soft and non-tender.    PSYCHIATRIC: Normal mood      IMAGING  No results found.    Data:  Recent Labs   Lab 07/30/24  0441   RBC 3.78*   HGB 11.4*   HCT 35.7*   MCV 94.4   MCH 30.2   MCHC 31.9   RDW 17.0*   NEPRELIM 3.90   WBC 5.5   .0     Recent Labs   Lab 07/30/24  0441   *   BUN 50*   CREATSERUM 5.01*   CA 9.3   ALB 3.5      K 4.5      CO2 30.0   ALKPHO 66   AST 29   ALT 33   BILT 0.3   TP 6.5       ASSESSMENT/PLAN      Lower GI bleeding  -Patient presenting with black tarry stools  -Patient on chronic anticoagulation with Eliquis and aspirin, currently holding.  -Hemoglobin noted to be 11.4  -Starting IV Protonix twice daily  -Light IV fluids  -Keep n.p.o.  -GI consulted, appreciate further recommendations    History of CAD  -Status post PCI in May 2024  -Continue statin, beta-blocker  -Holding aspirin  -Cardiology on consult, appreciate further recommendations    Paroxysmal atrial fibrillation  -Currently in sinus rhythm  -Rate at goal  -On chronic anticoagulation with Eliquis, holding  -Cardiology on consult, appreciate further recommendations    Chronic heart failure with preserved ejection fraction   -Without current signs of acute exacerbation  - Strict I&Os, Daily weights, Fluid restriction  - Cardiology on consult, appreciate recs.     ESRD on HD  -Nephrology on consult, appreciate recommendations for further HD    HTN  - controlled  - CPM  - Monitor and adjust as needed    Hyperlipidemia  -Continue statin    Addendum: Discussed with GI.  Recommend further evaluation with CT angiogram.  Pending results may need further endoscopy evaluation.      Plan of care discussed with patient at bedside.  Discussed with GI consultant and RN. Decision made that pt needs hospitalization for further management/monitoring.      Abdifatah Mesa MD    This note was prepared using Dragon Medical voice recognition dictation software. As a result errors may occur.  When identified these errors have been corrected. While every attempt is made to correct errors during dictation discrepancies may still exist

## 2024-07-30 NOTE — ED QUICK NOTES
Report received from night shift RNNina. Patient resting on ED cart, no signs of acute distress. Patient to be admitted to hospital. Awaiting bed placement.

## 2024-07-30 NOTE — CONSULTS
[facility]  part of [facility]    Report of Consultation    Ollie Hernández Patient Status:  Inpatient    1945 MRN [id number]   Location [facility] 3W/SW Attending Abdifatah Mesa MD   Hosp Day # 0 PCP Wili Parmar MD     Date of Admission:  2024  Date of Consult:  2024  Reason for Consultation:   Acute Gi bleeding  Recurrent GI bleed    History of Present Illness:   Patient is a 79 year old male with past medical history significant for end-stage renal disease on hemodialysis, asthma, KRAIG, hypertension, hyperlipidemia, chronic anemia, history of lower GI bleed with possible AVM versus dieulafoy's lesion in the hepatic flexure s/p APC and Clips in 2024 admitted again with evidence of maroon-colored stools.    Patient has had evaluation for anemia in the past.  He underwent EGD colonoscopy as well as capsule endoscopy in  for iron deficiency anemia.  This was all essentially negative.    He was admitted in 2024 with maroon-colored stools.  He underwent EGD and colonoscopy with me.  EGD was unremarkable.  Colonoscopy revealed evidence of a bleeding lesion at the level of the the hepatic flexure versus proximal transverse colon that was felt to be possible AVM versus dieulafoy's lesion.  This was cauterized with APC and then clipped X 1.  He achieved hemostasis at that time.  He returned to the hospital 2 weeks later with repeated episode of lower GI bleed.  He underwent repeat colonoscopy on Bianca 15, 2024 with Dr. Storey.  Evidence of previous clip was present in the hepatic flexure region.  This was not bleeding.  Red spot was noted.  This was clipped again with APC.  He had no further evidence of bleeding and was sent home.    He returns today after developing rectal bleeding last night into this morning.  He has had 3 episodes.  Initially darker maroon but now more brighter red.  No abdominal pain.  No preceding diarrhea.  No fevers.  No lightheadedness  or dizziness.  In the ER patient was noted to have a hemoglobin of 11.4 which is improved from his last hemoglobin on 7/4/2024.  Which was 10.5 at that time.          Past Medical History  Past Medical History:    Anemia    Asthma (HCC)    Back problem    BPH (benign prostatic hyperplasia)    Calculus of kidney    Cataract    Diabetes (HCC)    ESRD (end stage renal disease) on dialysis (HCC)    Essential hypertension    High blood pressure    High cholesterol    History of blood transfusion    Hyperlipidemia    Neuropathy    hands and feet    KRAIG on CPAP    Renal disorder    Sleep apnea    Visual impairment    glasses    Vocal cord paralysis, unilateral partial       Past Surgical History  Past Surgical History:   Procedure Laterality Date    Appendectomy      1981    Back surgery      Neck/back - 1998    Capsule endoscopy - internal referral      Cataract      12/2021 and 1/2022    Colonoscopy      Colonoscopy N/A 1/25/2021    Procedure: COLONOSCOPY;  Surgeon: Michael Gautam MD;  Location: [facility] ENDO    Colonoscopy N/A 6/3/2024    Procedure: COLONOSCOPY;  Surgeon: Gabriel Saldana MD;  Location: [facility] ENDOSCOPY    Colonoscopy N/A 6/15/2024    Procedure: COLONOSCOPY;  Surgeon: Carlyn Storey MD;  Location: [facility] ENDOSCOPY    Hand/finger surgery unlisted      Accidental trauma    Upper gi endoscopy,diagnosis         Family History  Family History   Problem Relation Age of Onset    Cancer Father         Kidney    Breast Cancer Mother     Diabetes Mother     Diabetes Maternal Grandmother     Diabetes Maternal Grandfather     Heart Disorder Other         Uncle       Social History  Pediatric History   Patient Parents    Not on file     Other Topics Concern    Not on file   Social History Narrative    Not on file           Current Medications:  Current Facility-Administered Medications   Medication Dose Route Frequency    atorvastatin (Lipitor) tab 40 mg  40 mg Oral Nightly    fluticasone-salmeterol (Advair Diskus) 250-50  MCG/ACT inhaler 1 puff  1 puff Inhalation BID    escitalopram (Lexapro) tab 5 mg  5 mg Oral Daily    fluticasone propionate (Flonase) 50 MCG/ACT nasal suspension 2 spray  2 spray Nasal Daily    traMADol (Ultram) tab 50 mg  50 mg Oral Q12H PRN    glucose (Dex4) 15 GM/59ML oral liquid 15 g  15 g Oral Q15 Min PRN    Or    glucose (Glutose) 40% oral gel 15 g  15 g Oral Q15 Min PRN    Or    glucose-vitamin C (Dex-4) chewable tab 4 tablet  4 tablet Oral Q15 Min PRN    Or    dextrose 50% injection 50 mL  50 mL Intravenous Q15 Min PRN    Or    glucose (Dex4) 15 GM/59ML oral liquid 30 g  30 g Oral Q15 Min PRN    Or    glucose (Glutose) 40% oral gel 30 g  30 g Oral Q15 Min PRN    Or    glucose-vitamin C (Dex-4) chewable tab 8 tablet  8 tablet Oral Q15 Min PRN    ondansetron (Zofran) 4 MG/2ML injection 4 mg  4 mg Intravenous Q6H PRN    sodium chloride 0.9% infusion   Intravenous Continuous    acetaminophen (Tylenol Extra Strength) tab 500 mg  500 mg Oral Q4H PRN    pantoprazole (Protonix) 40 mg in sodium chloride 0.9% PF 10 mL IV push  40 mg Intravenous Q12H    insulin aspart (NovoLOG) 100 Units/mL FlexPen 1-5 Units  1-5 Units Subcutaneous TID CC and HS    carvedilol (Coreg) tab 25 mg  25 mg Oral BID    hydrALAZINE (Apresoline) tab 25 mg  25 mg Oral Q8H STEPHANIE    sodium chloride 0.9 % IV bolus 100 mL  100 mL Intravenous Q30 Min PRN    And    albumin human (Albumin) 25% injection 25 g  25 g Intravenous PRN Dialysis     Medications Prior to Admission   Medication Sig    aspirin 81 MG Oral Tab EC Take 1 tablet (81 mg total) by mouth daily.    atorvastatin 40 MG Oral Tab Take 1 tablet (40 mg total) by mouth nightly.    apixaban 5 MG Oral Tab Take 1 tablet (5 mg total) by mouth 2 (two) times daily.    Budesonide-Formoterol Fumarate (SYMBICORT) 160-4.5 MCG/ACT Inhalation Aerosol Inhale 2 puffs into the lungs 2 (two) times daily.    PANTOPRAZOLE 40 MG Oral Tab EC TAKE 1 TABLET BY MOUTH EVERY  MORNING BEFORE BREAKFAST    linaGLIPtin  (TRADJENTA) 5 mg Oral Tab Take 1 tablet (5 mg total) by mouth daily.    escitalopram 5 MG Oral Tab Take 1 tablet (5 mg total) by mouth daily.    carvedilol 25 MG Oral Tab Take 1 tablet (25 mg total) by mouth 2 (two) times daily.    acetaminophen 500 MG Oral Tab Take 1 tablet (500 mg total) by mouth daily as needed for Pain.    cetirizine 10 MG Oral Tab Take 1 tablet (10 mg total) by mouth every other day.    Cholecalciferol 125 MCG (5000 UT) Oral Tab Take 1 tablet (5,000 Units total) by mouth 2 (two) times daily.    polyethylene glycol, PEG 3350, 17 g Oral Powd Pack 17 g Wed, Thurs, Sat    tetrahydrozoline 0.05 % Ophthalmic Solution 1 drop 2 (two) times daily as needed.    traMADol 50 MG Oral Tab Take 1 tablet (50 mg total) by mouth every 12 (twelve) hours as needed for Pain.    Glucose Blood (CONTOUR NEXT TEST) In Vitro Strip Test 3 times daily    felodipine ER 10 MG Oral Tablet 24 Hr Take 1 tablet (10 mg total) by mouth daily.    fluticasone propionate 50 MCG/ACT Nasal Suspension 2 sprays by Nasal route daily.    methocarbamol 500 MG Oral Tab Take 1 tablet (500 mg total) by mouth 2 (two) times daily as needed.    albuterol (PROAIR HFA) 108 (90 Base) MCG/ACT Inhalation Aero Soln Inhale 2 puffs into the lungs every 4 (four) hours as needed for Wheezing.    Darbepoetin Randell 60 MCG/ML Injection Solution Inject into the vein as needed.       Allergies  Allergies   Allergen Reactions    Adhesive Tape OTHER (SEE COMMENTS)     Severe rashes    Dust Mite Extract RASH       Review of Systems:   GENERAL HEALTH: feels okay otherwise.    SKIN: denies any unusual skin lesions or rashes  EYES: no visual complaints or deficits  HEENT: denies mouth sores  RESPIRATORY: no shortness of breath   CARDIOVASCULAR:no chest pain   GI: Refer to HPI,     Physical Exam:   Blood pressure (!) 181/65, pulse 70, temperature 97.6 °F (36.4 °C), temperature source Oral, resp. rate 20, height 5' 4\" (1.626 m), weight 145 lb 6.4 oz (66 kg), SpO2  98%.  GENERAL: well developed, in no apparent distress  SKIN: no rashes,no suspicious lesions  HEENT: atraumatic, normocephalic, oropharynx clear  NECK: supple,no adenopathy, no masses  LUNGS: clear to auscultation  CARDIO: RRR without murmur  GI: normal active BS, no tenderness on palpation, no masses or ascites, normal liver span/no organomegaly  EXTREMITIES: no calf tenderness  MUSCULOSKELETAL: no calf tenderness  PSYCH: normal affect        Results:     Laboratory Data:  Recent Labs   Lab 07/30/24  0441 07/30/24  1244   WBC 5.5  --    HGB 11.4* 11.4*   HCT 35.7* 34.9*   .0  --    CREATSERUM 5.01*  --    BUN 50*  --      --    K 4.5  --      --    CO2 30.0  --    *  --    CA 9.3  --    ALB 3.5  --    ALKPHO 66  --    TP 6.5  --    AST 29  --    ALT 33  --        Recent Labs   Lab 07/30/24  0441   *        Imaging:  CTA ABD/PEL (CPT=74174)    Result Date: 7/30/2024  CONCLUSION: No active GI bleed    Dictated by (CST): Osmar Ortega MD on 7/30/2024 at 2:41 PM     Finalized by (CST): Osmar Ortega MD on 7/30/2024 at 2:48 PM            Impression:   Recurrent GI bleeding  Maroon stools  3.  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  S/p APC and Clip.   -Repeat evidence of bleeding 6/15/2024 status post colonoscopy with no active bleeding noted.  No additional clip placed at the previous hepatic flexure site.  -Etiology of this current bleed is unclear however suspect it may be from the same hepatic flexure site or another AVM cannot completely rule out an upper GI source or small bowel source however his previous evaluations did not reveal any other abnormalities.    4.  ESRD on hemodialysis (HCC)          Recommendations:  Monitor H&H transfuse necessary  Stat CT angio of the abdomen pelvis see if we can identify a source as he has had recent bleeding within the last hour.   Will need to consider repeat endoscopic evaluation especially given  the need for anticoagulation.   Clear liquid diet can be considered if remains stable    Time spent in direct patient contact and decision making as well as counseling/coordination of care:  70 minutes  Thank you for allowing me to participate in the care of your patient.    Gabriel Saldana MD      7/30/2024

## 2024-07-30 NOTE — CONSULTS
[facility]  part of [facility]    Report of Consultation    Date of Admission:  7/30/2024  Date of Consult:  7/30/2024   Reason for Consultation:     ESRD on HD     History of Present Illness:     Patient is a 79 year old male with past medical history of coronary artery disease s/p PCI on 5/21, A-fib on anticoagulation, ESRD on hemodialysis Monday Wednesday Friday via AV fistula, hypertension who presented to the emergency room for rectal bleeding and maroon-colored stools     Patient was hospitalized twice in June for similar issues. Follows with Dr. Martinez and Dr. Keaton kwon at [facility] for HD . Last HD was yesterday     Patient had PCI in May and was on plavix. Given recurrent bleed plavix was stopped and now on ASA and on eliquis for A-fib.     Had one episode in ED just prior to arrival to floor       Past Medical History  Past Medical History:    Anemia    Asthma (HCC)    Back problem    BPH (benign prostatic hyperplasia)    Calculus of kidney    Cataract    Diabetes (HCC)    ESRD (end stage renal disease) on dialysis (HCC)    Essential hypertension    High blood pressure    High cholesterol    History of blood transfusion    Hyperlipidemia    Neuropathy    hands and feet    KRAIG on CPAP    Renal disorder    Sleep apnea    Visual impairment    glasses    Vocal cord paralysis, unilateral partial       Past Surgical History  Past Surgical History:   Procedure Laterality Date    Appendectomy      1981    Back surgery      Neck/back - 1998    Capsule endoscopy - internal referral      Cataract      12/2021 and 1/2022    Colonoscopy      Colonoscopy N/A 1/25/2021    Procedure: COLONOSCOPY;  Surgeon: Michael Gautam MD;  Location: [facility] ENDO    Colonoscopy N/A 6/3/2024    Procedure: COLONOSCOPY;  Surgeon: Gabriel Saldana MD;  Location: [facility] ENDOSCOPY    Colonoscopy N/A 6/15/2024    Procedure: COLONOSCOPY;  Surgeon: Carlyn Storey MD;  Location: [facility] ENDOSCOPY    Hand/finger surgery  unlisted      Accidental trauma    Upper gi endoscopy,diagnosis         Family History  Family History   Problem Relation Age of Onset    Cancer Father         Kidney    Breast Cancer Mother     Diabetes Mother     Diabetes Maternal Grandmother     Diabetes Maternal Grandfather     Heart Disorder Other         Uncle       Social History  Pediatric History   Patient Parents    Not on file     Other Topics Concern    Not on file   Social History Narrative    Not on file           Current Medications:  Current Facility-Administered Medications   Medication Dose Route Frequency    atorvastatin (Lipitor) tab 40 mg  40 mg Oral Nightly    fluticasone-salmeterol (Advair Diskus) 250-50 MCG/ACT inhaler 1 puff  1 puff Inhalation BID    escitalopram (Lexapro) tab 5 mg  5 mg Oral Daily    fluticasone propionate (Flonase) 50 MCG/ACT nasal suspension 2 spray  2 spray Nasal Daily    traMADol (Ultram) tab 50 mg  50 mg Oral Q12H PRN    glucose (Dex4) 15 GM/59ML oral liquid 15 g  15 g Oral Q15 Min PRN    Or    glucose (Glutose) 40% oral gel 15 g  15 g Oral Q15 Min PRN    Or    glucose-vitamin C (Dex-4) chewable tab 4 tablet  4 tablet Oral Q15 Min PRN    Or    dextrose 50% injection 50 mL  50 mL Intravenous Q15 Min PRN    Or    glucose (Dex4) 15 GM/59ML oral liquid 30 g  30 g Oral Q15 Min PRN    Or    glucose (Glutose) 40% oral gel 30 g  30 g Oral Q15 Min PRN    Or    glucose-vitamin C (Dex-4) chewable tab 8 tablet  8 tablet Oral Q15 Min PRN    ondansetron (Zofran) 4 MG/2ML injection 4 mg  4 mg Intravenous Q6H PRN    sodium chloride 0.9% infusion   Intravenous Continuous    acetaminophen (Tylenol Extra Strength) tab 500 mg  500 mg Oral Q4H PRN    pantoprazole (Protonix) 40 mg in sodium chloride 0.9% PF 10 mL IV push  40 mg Intravenous Q12H    insulin aspart (NovoLOG) 100 Units/mL FlexPen 1-5 Units  1-5 Units Subcutaneous TID CC and HS    carvedilol (Coreg) tab 25 mg  25 mg Oral BID    hydrALAZINE (Apresoline) tab 25 mg  25 mg Oral Q8H  STEPHANIE       Allergies  Allergies   Allergen Reactions    Adhesive Tape OTHER (SEE COMMENTS)     Severe rashes    Dust Mite Extract RASH       Review of Systems:   Constitutional: negative for fatigue, fevers and weight loss  Eyes: negative for irritation, redness and visual disturbance  Ears, nose, mouth, throat, and face: negative for hearing loss and sore throat  Respiratory: negative for cough, hemoptysis and wheezing  Cardiovascular: negative for chest pain, exertional dyspnea, lower extremity edema and palpitations  Gastrointestinal: negative for abdominal pain, diarrhea and nausea  Genitourinary:negative for dysuria, frequency and hematuria  Hematologic/lymphatic: negative for bleeding and easy bruising  Musculoskeletal:negative for back pain, bone pain and muscle weakness  Neurological: negative for gait problems, memory problems and seizures  Behavioral/Psych: negative for anxiety and depression  Endocrine: negative for polyuria and weight loss     Physical Exam:   Height: 5' 4\" (162.6 cm) (07/30 1013)  Weight: 145 lb 6.4 oz (66 kg) (07/30 1013)  BSA (Calculated - sq m): 1.71 sq meters (07/30 1013)  Pulse: 67 (07/30 1026)  BP: 169/63 (07/30 0926)  Temp: 98.1 °F (36.7 °C) (07/30 0926)  Do Not Use - Resp Rate: --  SpO2: 99 % (07/30 0926)  Temp:  [98.1 °F (36.7 °C)] 98.1 °F (36.7 °C)  Pulse:  [] 67  Resp:  [15-23] 20  BP: (142-169)/(57-78) 169/63  SpO2:  [99 %-100 %] 99 %  SpO2: 99 %     Intake/Output Summary (Last 24 hours) at 7/30/2024 1308  Last data filed at 7/30/2024 1013  Gross per 24 hour   Intake --   Output 1 ml   Net -1 ml     Wt Readings from Last 5 Encounters:   07/30/24 145 lb 6.4 oz (66 kg)   06/27/24 163 lb (73.9 kg)   06/13/24 156 lb 8 oz (71 kg)   06/05/24 150 lb 4.8 oz (68.2 kg)   05/28/24 163 lb 9.6 oz (74.2 kg)       General appearance: alert, appears stated age and cooperative  Head: Normocephalic, atraumatic  Eyes: conjunctivae/corneas clear  Throat: lips, mucosa, and tongue normal;  teeth and gums normal  Neck:  no JVD, supple, symmetrical  Pulmonary:  clear to auscultation bilaterally  Cardiovascular: S1, S2 normal, no rub, regular rate and rhythm  Abdominal: soft, non-tender; non distended, bowel sounds normal   Extremities: extremities normal, no edema  Pulses: pedal pulses palpable  Skin: No rashes or lesions  Lymph nodes: Cervical, supraclavicular normal  Neurologic: Grossly normal  Psychiatric: calm    Results:     Laboratory Data:  Recent Labs   Lab 07/30/24  0441 07/30/24  1244   RBC 3.78*  --    HGB 11.4* 11.4*   HCT 35.7* 34.9*   MCV 94.4  --    NEPRELIM 3.90  --    WBC 5.5  --    .0  --      Recent Labs   Lab 07/30/24  0441   *   BUN 50*   CREATSERUM 5.01*   CA 9.3   ALB 3.5      K 4.5      CO2 30.0   ALKPHO 66   AST 29   ALT 33   BILT 0.3   TP 6.5     No results found for: \"PTT\", \"INR\"  No results for input(s): \"BNP\" in the last 168 hours.           Impression:       Patient is a 79 year old male with past medical history of coronary artery disease s/p PCI on 5/21, A-fib on anticoagulation, ESRD on hemodialysis Monday Wednesday Friday via AV fistula, hypertension who presented to the emergency room for rectal bleeding and maroon-colored stools     1.ESRD: On HD  HD through AV fistula.  HD at [facility] outpatient Monday Wednesday Friday  Next HD tomm  Check serum phosphorus     2.GI bleed: recurrent .  GI consulted-input noted  EGD and colonoscopy on 6/3 and colonoscopy on 6/21  GI consulted      3.coronary artery disease s/p PCI on 5/21/ P-Afib  Cardiology input noted. Off of plavix. On ASA and eliquis - currently on hold  High risk of stent thrombosis and stroke       Discussed with Nursing and Dr. Allen    Thank you for allowing me to participate in the care of your patient.    Walker Sheppard MD  7/30/2024

## 2024-07-30 NOTE — ED INITIAL ASSESSMENT (HPI)
Patient c/o rectal bleeding since last night. States the stool is dark. Only c/o of gas like abdominal pain. +nausea. +Dizziness. Dialysis M/W/F. Last dialysis session yesterday and removed 1.7L. Hx of GI bleeding. +Blood thinners

## 2024-07-30 NOTE — ED QUICK NOTES
Orders for admission, patient is aware of plan and ready to go upstairs. Any questions, please call ED ROSALINO Heaton at extension [phone].     Patient Covid vaccination status: Fully vaccinated     COVID Test Ordered in ED: None    COVID Suspicion at Admission: N/A    Running Infusions:  None    Mental Status/LOC at time of transport: A/Ox4    Other pertinent information: Independent and ambulatory from home   CIWA score: N/A   NIH score:  N/A

## 2024-07-30 NOTE — CONSULTS
Cardiology Consultation  [facility]    Ollie Hernández Patient Status:  Inpatient    1945 MRN [id number]   Location [facility] 3W/SW Attending Abdifatah Mesa MD   Hosp Day # 0 PCP Wili Parmar MD     Reason for Consultation:  GIB, AF, CAD s/p PCI 2024    History of Present Illness:  Ollie Hernández is a a(n) 79 year old male with pmh AF on eliquis, CAD s/p PCI Lcx 2024, HFpEF, ESRD on HD, HTN, HL presenting with GIB.  Of note, patient with two admissions in 2024 following PCI 2024 with GI bleeding.  S/p EGD and c-scope with intervention to bleeding lesion in hepatic flexure vs transverse colon on each occasion.  States yesterday evening noticed dark/bright red stool prompting ER admission.  Denies chest pain, palpitations, orthopnea, PND or LE edema.  Denies syncope or near syncope. +Chronic, stable dyspnea on exertion.  Recently transitioned from plavix/eliquis to asa/eliquis.      On admission patient afeb, , /66.  Hgb 11.4.  ECG with ST with PVCs.  Cardiology consulted for further eval and management.      History:  Past Medical History:    Anemia    Asthma (HCC)    Back problem    BPH (benign prostatic hyperplasia)    Calculus of kidney    Cataract    Diabetes (HCC)    ESRD (end stage renal disease) on dialysis (HCC)    Essential hypertension    High blood pressure    High cholesterol    History of blood transfusion    Hyperlipidemia    Neuropathy    hands and feet    KRAIG on CPAP    Renal disorder    Sleep apnea    Visual impairment    glasses    Vocal cord paralysis, unilateral partial     Past Surgical History:   Procedure Laterality Date    Appendectomy          Back surgery      Neck/back -     Capsule endoscopy - internal referral      Cataract      2021 and 2022    Colonoscopy      Colonoscopy N/A 2021    Procedure: COLONOSCOPY;  Surgeon: Michael Gautam MD;  Location: [facility] ENDO    Colonoscopy N/A 6/3/2024    Procedure: COLONOSCOPY;   Surgeon: Gabriel Saldana MD;  Location: [facility] ENDOSCOPY    Colonoscopy N/A 6/15/2024    Procedure: COLONOSCOPY;  Surgeon: Carlyn Storey MD;  Location: [facility] ENDOSCOPY    Hand/finger surgery unlisted      Accidental trauma    Upper gi endoscopy,diagnosis       Family History   Problem Relation Age of Onset    Cancer Father         Kidney    Breast Cancer Mother     Diabetes Mother     Diabetes Maternal Grandmother     Diabetes Maternal Grandfather     Heart Disorder Other         Uncle      reports that he quit smoking about 43 years ago. His smoking use included cigarettes. He started smoking about 60 years ago. He has a 17 pack-year smoking history. He has never used smokeless tobacco. He reports that he does not drink alcohol and does not use drugs.    Allergies:  Allergies   Allergen Reactions    Adhesive Tape OTHER (SEE COMMENTS)     Severe rashes    Dust Mite Extract RASH       Medications:  Current Facility-Administered Medications on File Prior to Encounter   Medication Dose Route Frequency Provider Last Rate Last Admin    [COMPLETED] sodium chloride 0.9% infusion   Intravenous Once Ebonie Simmons DO 10 mL/hr at 24 0645 New Bag at 24 0645    [COMPLETED] iron sucrose (Venofer) 20 mg/mL injection 200 mg  200 mg Intravenous Once Carlyn Storey MD   200 mg at 24 1605    [COMPLETED] sodium chloride 0.9% infusion   Intravenous Once Wili Parmar MD 25 mL/hr at 06/15/24 0950 New Bag at 06/15/24 0950    [] sodium chloride 0.9 % IV bolus 100 mL  100 mL Intravenous Q30 Min PRN Elke Martinez MD        And    [] albumin human (Albumin) 25% injection 25 g  25 g Intravenous PRN Dialysis Elke Martinez MD        [COMPLETED] polyethylene glycol-electrolyte (Golytely) 236 g oral solution 4,000 mL  4,000 mL Oral Once Carlyn Storey MD   4,000 mL at 24 2287    [COMPLETED] desmopressin (DDAVP) 21.8 mcg in sodium chloride 0.9% 50 mL IVPB  0.3 mcg/kg Intravenous Once Antonio Nunes MD   Stopped at  24 1129    [] albumin human (Albumin) 25% injection 25 g  25 g Intravenous PRN Dialysis Carlyn Storey MD        [COMPLETED] iron sucrose (Venofer) 300 mg in sodium chloride 0.9% 250 mL IVPB  300 mg Intravenous Daily José Callahan .7 mL/hr at 24 1133 300 mg at 24 1133    [COMPLETED] polyethylene glycol-electrolyte (Golytely) 236 g oral solution 2,000 mL  2,000 mL Oral Once Emilio Talley MD   2,000 mL at 24 0456    [COMPLETED] polyethylene glycol-electrolyte (Golytely) 236 g oral solution 2,000 mL  2,000 mL Oral Once Emilio Talley MD   2,000 mL at 24 1800    [] sodium chloride 0.9 % IV bolus 100 mL  100 mL Intravenous Q30 Min PRN Walker Klein MD        And    [] albumin human (Albumin) 25% injection 25 g  25 g Intravenous PRN Dialysis Walker Klein MD        [COMPLETED] acetaminophen (Tylenol Extra Strength) tab 1,000 mg  1,000 mg Oral Once Devaughn Montiel, DO   1,000 mg at 24 0345    [COMPLETED] sodium chloride 0.9% infusion   Intravenous On Call Luis Orozco MD   Stopped at 24 0000    [COMPLETED] lidocaine PF (Xylocaine-MPF) 2 % injection             [COMPLETED] heparin in sodium chloride 0.9% (Porcine) 2 Units/mL flush bag premix             [COMPLETED] heparin in sodium chloride 0.9% (Porcine) 2 Units/mL flush bag premix             [COMPLETED] fentaNYL (Sublimaze) 50 mcg/mL injection             [COMPLETED] midazolam (Versed) 2 MG/2ML injection             [COMPLETED] heparin (Porcine) 1000 UNIT/ML injection             [COMPLETED] Nitroglycerin in D5W 200-5 MCG/ML-% injection             [COMPLETED] midazolam (Versed) 2 MG/2ML injection             [COMPLETED] iohexol (Omnipaque) 300 MG/ML injection 350 mL  350 mL Intravenous ONCE PRN Luis Orozco MD   230 mL at 24 1601    [] sodium chloride 0.9% infusion   Intravenous Continuous Luis Orozco MD   Stopped at 24 0000     Current Outpatient Medications  on File Prior to Encounter   Medication Sig Dispense Refill    aspirin 81 MG Oral Tab EC Take 1 tablet (81 mg total) by mouth daily. 90 tablet 3    atorvastatin 40 MG Oral Tab Take 1 tablet (40 mg total) by mouth nightly. 90 tablet 3    apixaban 5 MG Oral Tab Take 1 tablet (5 mg total) by mouth 2 (two) times daily.      Budesonide-Formoterol Fumarate (SYMBICORT) 160-4.5 MCG/ACT Inhalation Aerosol Inhale 2 puffs into the lungs 2 (two) times daily. 3 each 3    PANTOPRAZOLE 40 MG Oral Tab EC TAKE 1 TABLET BY MOUTH EVERY  MORNING BEFORE BREAKFAST 90 tablet 3    linaGLIPtin (TRADJENTA) 5 mg Oral Tab Take 1 tablet (5 mg total) by mouth daily. 90 tablet 1    escitalopram 5 MG Oral Tab Take 1 tablet (5 mg total) by mouth daily. 90 tablet 3    carvedilol 25 MG Oral Tab Take 1 tablet (25 mg total) by mouth 2 (two) times daily.      acetaminophen 500 MG Oral Tab Take 1 tablet (500 mg total) by mouth daily as needed for Pain.      cetirizine 10 MG Oral Tab Take 1 tablet (10 mg total) by mouth every other day.      Cholecalciferol 125 MCG (5000 UT) Oral Tab Take 1 tablet (5,000 Units total) by mouth 2 (two) times daily.      polyethylene glycol, PEG 3350, 17 g Oral Powd Pack 17 g Wed, Thurs, Sat      tetrahydrozoline 0.05 % Ophthalmic Solution 1 drop 2 (two) times daily as needed.      traMADol 50 MG Oral Tab Take 1 tablet (50 mg total) by mouth every 12 (twelve) hours as needed for Pain.      Glucose Blood (CONTOUR NEXT TEST) In Vitro Strip Test 3 times daily 300 each 1    felodipine ER 10 MG Oral Tablet 24 Hr Take 1 tablet (10 mg total) by mouth daily. 90 tablet 3    fluticasone propionate 50 MCG/ACT Nasal Suspension 2 sprays by Nasal route daily. 48 g 3    methocarbamol 500 MG Oral Tab Take 1 tablet (500 mg total) by mouth 2 (two) times daily as needed. 60 tablet 1    albuterol (PROAIR HFA) 108 (90 Base) MCG/ACT Inhalation Aero Soln Inhale 2 puffs into the lungs every 4 (four) hours as needed for Wheezing. 3 each 3     Darbepoetin Randell 60 MCG/ML Injection Solution Inject into the vein as needed.         Review of Systems:  Constitutional: denies fevers, chills, night sweats  HEENT: denies headache, vision changes, trouble or pain with swallowing  Cardiac: denies chest pain, palpitations, edema  Pulm: denies dyspnea, cough, wheeze  GI: + GI bleed  : denies hematuria, dysuria, incontinence  MSK: denies muscle or joint pains  Neuro: denies numbness, weakness, paresthesias  Psych: denies anxiety, depression  Integument: denies skin rashes or lesions  Heme: denies easy bruising or bleeding  Endo: denies heat/cold intolerance, skin or nail changes      Physical Exam:  Blood pressure (!) 169/63, pulse 67, temperature 98.1 °F (36.7 °C), temperature source Oral, resp. rate 20, height 5' 4\" (1.626 m), weight 145 lb 6.4 oz (66 kg), SpO2 99%.  Wt Readings from Last 3 Encounters:   07/30/24 145 lb 6.4 oz (66 kg)   06/27/24 163 lb (73.9 kg)   06/13/24 156 lb 8 oz (71 kg)       General: awake, alert, oriented x 3, no acute distress  HEENT: at/nc, perrl, eomi  Neck: No JVD, carotids 2+ no bruits.  Cardiac: Regular rate and rhythm, S1, S2 normal, no murmur, rub or gallop.  Lungs: Clear without wheezes, rales, rhonchi or dullness.  Normal excursions and effort.  Abdomen: Soft, non-tender, non-distended, normal bowel sounds   Extremities: Without clubbing, cyanosis or edema.  Peripheral pulses are 2+.  Neurologic: Alert and oriented, normal affect.  Psych: normal mood and affect  Skin: Warm and dry.       Laboratories and Data:  Diagnostics:  EKG: ST, PVCs      Labs:   CBC:    Lab Results   Component Value Date    WBC 5.5 07/30/2024    WBC 5.4 07/04/2024    WBC 7.3 06/18/2024     Lab Results   Component Value Date    HGB 11.4 (L) 07/30/2024    HGB 10.5 (L) 07/04/2024    HGB 7.9 (L) 06/18/2024      Lab Results   Component Value Date    .0 07/30/2024    .0 07/04/2024    .0 06/18/2024     BMP:   No results found for:  \"GLUCOSE\"  Lab Results   Component Value Date    K 4.5 07/30/2024    K 4.4 07/04/2024    K 3.5 06/18/2024     Lab Results   Component Value Date    BUN 50 (H) 07/30/2024    BUN 37 (H) 07/04/2024    BUN 23 06/18/2024     Lab Results   Component Value Date    CREATSERUM 5.01 (H) 07/30/2024    CREATSERUM 4.25 (H) 07/04/2024    CREATSERUM 4.28 (H) 06/18/2024     Cholesterol:     Lab Results   Component Value Date    CHOLEST 153 07/04/2024    CHOLEST 170 06/23/2023    CHOLEST 157 11/23/2022     Lab Results   Component Value Date    HDL 41 07/04/2024    HDL 43 06/23/2023    HDL 36 (L) 11/23/2022     Lab Results   Component Value Date    TRIG 60 07/04/2024    TRIG 132 06/23/2023    TRIG 143 11/23/2022     Lab Results   Component Value Date     (H) 07/04/2024     (H) 06/23/2023    LDL 96 11/23/2022     Lab Results   Component Value Date    AST 29 07/30/2024    AST 24 07/04/2024    AST 16 06/16/2024     Lab Results   Component Value Date    ALT 33 07/30/2024    ALT 24 07/04/2024    ALT 12 06/16/2024       Assessment/Plan:  79 year old male presenting with:    1) GI bleeding (two similar admissions 6/2024 with intervention to bleeding lesion hepatic flexure vs transverse colon on each admission)  2) CAD s/p PCI Lcx 5/2024  3) pAF on eliquis  4) HFpEF  5) ESRD on HD  6) HTN  7) HL    - Cont statin, bb, hydralazine  - Asa and eliquis on hold; resume asa as soon as safely possible from GI standpoint (risk of stent thrombosis off asa and stroke off eliquis discussed with patient in detail)  - Consider outpatient eval for Watchman given recurrent GI bleeding and now long term indication for antiplatelet/anticoagulant therapy  - GI consult pending  - Monitor on tele  - Will follow    Robel Saldana MD  Interventional cardiologist, [facility]  7/30/2024  10:20 AM

## 2024-07-30 NOTE — ED PROVIDER NOTES
Patient Seen in: [facility] ER      History     Chief Complaint   Patient presents with    GI Bleeding     Stated Complaint: Rectal bleeding    Subjective:   HPI    79 year old male with multiple medical issues including asthma, bph, dm, esrd on HD M/W/F, hld, htn, KRAIG, neuropathy, atrial fibrillation on Eliquis, CAD s/p stents, recurrent GI bleeding with h/o AVM requiring clips in June of this year who now presents with GI bleeding since yesterday. +dark red stool, some abdominal cramping. He denies fever/chills or vomiting. He last took his Eliquis last night. Pt states he stopped taking Plavix a few weeks ago after dc from last GI bleed.     Objective:   Past Medical History:    Anemia    Asthma (HCC)    Back problem    BPH (benign prostatic hyperplasia)    Calculus of kidney    Cataract    Diabetes (HCC)    ESRD (end stage renal disease) on dialysis (HCC)    Essential hypertension    High blood pressure    High cholesterol    History of blood transfusion    Hyperlipidemia    Neuropathy    hands and feet    KRAIG on CPAP    Renal disorder    Sleep apnea    Visual impairment    glasses    Vocal cord paralysis, unilateral partial              Past Surgical History:   Procedure Laterality Date    Appendectomy      1981    Back surgery      Neck/back - 1998    Capsule endoscopy - internal referral      Cataract      12/2021 and 1/2022    Colonoscopy      Colonoscopy N/A 1/25/2021    Procedure: COLONOSCOPY;  Surgeon: Michael Gautam MD;  Location: [facility] ENDO    Colonoscopy N/A 6/3/2024    Procedure: COLONOSCOPY;  Surgeon: Gabriel Saldana MD;  Location: [facility] ENDOSCOPY    Colonoscopy N/A 6/15/2024    Procedure: COLONOSCOPY;  Surgeon: Carlyn Storey MD;  Location: [facility] ENDOSCOPY    Hand/finger surgery unlisted      Accidental trauma    Upper gi endoscopy,diagnosis                  Social History     Socioeconomic History    Marital status:    Tobacco Use    Smoking status: Former     Current packs/day: 0.00      Average packs/day: 1 pack/day for 17.0 years (17.0 ttl pk-yrs)     Types: Cigarettes     Start date: 1964     Quit date: 1981     Years since quittin.6    Smokeless tobacco: Never   Vaping Use    Vaping status: Never Used   Substance and Sexual Activity    Alcohol use: No     Alcohol/week: 0.0 standard drinks of alcohol    Drug use: No    Sexual activity: Yes     Partners: Female     Social Determinants of Health     Financial Resource Strain: Low Risk  (2024)    Financial Resource Strain     Difficulty of Paying Living Expenses: Not hard at all     Med Affordability: No   Food Insecurity: No Food Insecurity (2024)    Food Insecurity     Food Insecurity: Never true   Transportation Needs: No Transportation Needs (2024)    Transportation Needs     Lack of Transportation: No   Housing Stability: Low Risk  (2024)    Housing Stability     Housing Instability: No              Review of Systems    Positive for stated Chief Complaint: GI Bleeding    Other systems are as noted in HPI.  Constitutional and vital signs reviewed.      All other systems reviewed and negative except as noted above.    Physical Exam     ED Triage Vitals [24 0411]   /66   Pulse 110   Resp 20   Temp 98.1 °F (36.7 °C)   Temp src Oral   SpO2 100 %   O2 Device None (Room air)       Current Vitals:   Vital Signs  BP: (!) 169/63  Pulse: 67  Resp: 20  Temp: 98.1 °F (36.7 °C)  Temp src: Oral  MAP (mmHg): 93    Oxygen Therapy  SpO2: 99 %  O2 Device: None (Room air)            Physical Exam  Vitals and nursing note reviewed.   Constitutional:       General: He is not in acute distress.     Appearance: Normal appearance. He is well-developed. He is not ill-appearing, toxic-appearing or diaphoretic.   HENT:      Head: Normocephalic and atraumatic.   Eyes:      Conjunctiva/sclera: Conjunctivae normal.      Pupils: Pupils are equal, round, and reactive to light.   Cardiovascular:      Rate and Rhythm: Normal rate  and regular rhythm.      Pulses: Normal pulses.      Heart sounds: Normal heart sounds. No murmur heard.  Pulmonary:      Effort: Pulmonary effort is normal. No respiratory distress.      Breath sounds: Normal breath sounds. No wheezing.   Abdominal:      General: There is no distension.      Palpations: Abdomen is soft.      Tenderness: There is no abdominal tenderness. There is no guarding.   Genitourinary:     Comments: Stool output in toilet collection hat shows dark maroon jelly-like stool.  Musculoskeletal:         General: No tenderness. Normal range of motion.      Cervical back: Full passive range of motion without pain, normal range of motion and neck supple. No rigidity. Normal range of motion.      Right lower leg: No edema.      Left lower leg: No edema.   Skin:     General: Skin is warm and dry.      Findings: No rash.   Neurological:      Mental Status: He is alert and oriented to person, place, and time.      GCS: GCS eye subscore is 4. GCS verbal subscore is 5. GCS motor subscore is 6.      Sensory: Sensation is intact. No sensory deficit.      Motor: Motor function is intact. No weakness.   Psychiatric:         Attention and Perception: Attention normal.         Mood and Affect: Mood normal.         Behavior: Behavior normal. Behavior is cooperative.           ED Course     Labs Reviewed   COMP METABOLIC PANEL (14) - Abnormal; Notable for the following components:       Result Value    Glucose 138 (*)     BUN 50 (*)     Creatinine 5.01 (*)     Calculated Osmolality 312 (*)     eGFR-Cr 11 (*)     All other components within normal limits   LIPASE - Abnormal; Notable for the following components:    Lipase 224 (*)     All other components within normal limits   POCT GLUCOSE - Abnormal; Notable for the following components:    POC Glucose  138 (*)     All other components within normal limits   CBC W/ DIFFERENTIAL - Abnormal; Notable for the following components:    RBC 3.78 (*)     HGB 11.4 (*)      HCT 35.7 (*)     RDW-SD 59.1 (*)     RDW 17.0 (*)     All other components within normal limits   CBC WITH DIFFERENTIAL WITH PLATELET    Narrative:     The following orders were created for panel order CBC With Differential With Platelet.  Procedure                               Abnormality         Status                     ---------                               -----------         ------                     CBC W/ DIFFERENTIAL[[id number]]          Abnormal            Final result                 Please view results for these tests on the individual orders.   HEMOGLOBIN + HEMATOCRIT   HEMOGLOBIN + HEMATOCRIT   TYPE AND SCREEN   RAINBOW DRAW LAVENDER   RAINBOW DRAW LIGHT GREEN   RAINBOW DRAW BLUE     EKG    Rate, intervals and axes as noted on EKG Report.  Rate: 104  Rhythm: Sinus Rhythm  Reading: sinus tachycardia                  MDM      Pulse Ox: 99%, Normal, RA    Cardiac Monitor:   Pulse Readings from Last 1 Encounters:   07/30/24 67   , sinus, normal for rate and rhythm     Pt on eliquis and ASA, now with recurrent GI bleeding. Pt given protonix.     D/w Dr Granados - advises Dr Parmar pt will now go to Hospitalist  D/w Dr Julian - Hospitalist will admit  D/w Dr Saldana - will consult  D/w [facility] - will consult  D/w Dr Musa Sheppard - will consult    Admission disposition: 7/30/2024  7:52 AM               Disposition and Plan     Clinical Impression:  1. Lower GI bleeding    2. ESRD on hemodialysis (HCC)         Disposition:  Admit  7/30/2024  7:52 am    Follow-up:  No follow-up provider specified.  We recommend that you schedule follow up care with a primary care provider within the next three months to obtain basic health screening including reassessment of your blood pressure.      Medications Prescribed:  Current Discharge Medication List                            Hospital Problems       Present on Admission  Date Reviewed: 6/27/2024            ICD-10-CM Noted POA    * (Principal) Lower GI bleeding K92.2  7/30/2024 Unknown    ESRD on hemodialysis (HCC) N18.6, Z99.2 7/30/2024 Unknown

## 2024-07-30 NOTE — RESPIRATORY THERAPY NOTE
CPAP EVALUATION :     Pt does have a previous diagnosis of KRAIG. Pt does routinely use a CPAP device at home.      CPAP INITIATION:     Pt to be placed on CPAP: yes, patient wants CPAP at 9:00 pm

## 2024-07-31 ENCOUNTER — ANESTHESIA EVENT (OUTPATIENT)
Dept: ENDOSCOPY | Facility: HOSPITAL | Age: 79
End: 2024-07-31
Payer: MEDICARE

## 2024-07-31 ENCOUNTER — ANESTHESIA (OUTPATIENT)
Dept: ENDOSCOPY | Facility: HOSPITAL | Age: 79
End: 2024-07-31
Payer: MEDICARE

## 2024-07-31 LAB
ANION GAP SERPL CALC-SCNC: 11 MMOL/L (ref 0–18)
BASOPHILS # BLD AUTO: 0.07 X10(3) UL (ref 0–0.2)
BASOPHILS NFR BLD AUTO: 1 %
BUN BLD-MCNC: 50 MG/DL (ref 9–23)
BUN/CREAT SERPL: 8.7 (ref 10–20)
CALCIUM BLD-MCNC: 9.7 MG/DL (ref 8.7–10.4)
CHLORIDE SERPL-SCNC: 105 MMOL/L (ref 98–112)
CHOLEST SERPL-MCNC: 146 MG/DL (ref ?–200)
CO2 SERPL-SCNC: 23 MMOL/L (ref 21–32)
CREAT BLD-MCNC: 5.72 MG/DL
DEPRECATED RDW RBC AUTO: 58.3 FL (ref 35.1–46.3)
EGFRCR SERPLBLD CKD-EPI 2021: 10 ML/MIN/1.73M2 (ref 60–?)
EOSINOPHIL # BLD AUTO: 0.13 X10(3) UL (ref 0–0.7)
EOSINOPHIL NFR BLD AUTO: 1.9 %
ERYTHROCYTE [DISTWIDTH] IN BLOOD BY AUTOMATED COUNT: 16.8 % (ref 11–15)
GLUCOSE BLD-MCNC: 136 MG/DL (ref 70–99)
GLUCOSE BLDC GLUCOMTR-MCNC: 111 MG/DL (ref 70–99)
GLUCOSE BLDC GLUCOMTR-MCNC: 129 MG/DL (ref 70–99)
GLUCOSE BLDC GLUCOMTR-MCNC: 139 MG/DL (ref 70–99)
GLUCOSE BLDC GLUCOMTR-MCNC: 155 MG/DL (ref 70–99)
HCT VFR BLD AUTO: 27.2 %
HCT VFR BLD AUTO: 31.5 %
HDLC SERPL-MCNC: 29 MG/DL (ref 40–59)
HGB BLD-MCNC: 10.1 G/DL
HGB BLD-MCNC: 8.6 G/DL
IMM GRANULOCYTES # BLD AUTO: 0.02 X10(3) UL (ref 0–1)
IMM GRANULOCYTES NFR BLD: 0.3 %
LDLC SERPL CALC-MCNC: 93 MG/DL (ref ?–100)
LYMPHOCYTES # BLD AUTO: 1.08 X10(3) UL (ref 1–4)
LYMPHOCYTES NFR BLD AUTO: 15.4 %
MAGNESIUM SERPL-MCNC: 1.7 MG/DL (ref 1.6–2.6)
MCH RBC QN AUTO: 30.2 PG (ref 26–34)
MCHC RBC AUTO-ENTMCNC: 32.1 G/DL (ref 31–37)
MCV RBC AUTO: 94.3 FL
MONOCYTES # BLD AUTO: 0.31 X10(3) UL (ref 0.1–1)
MONOCYTES NFR BLD AUTO: 4.4 %
NEUTROPHILS # BLD AUTO: 5.4 X10 (3) UL (ref 1.5–7.7)
NEUTROPHILS # BLD AUTO: 5.4 X10(3) UL (ref 1.5–7.7)
NEUTROPHILS NFR BLD AUTO: 77 %
NONHDLC SERPL-MCNC: 117 MG/DL (ref ?–130)
OSMOLALITY SERPL CALC.SUM OF ELEC: 303 MOSM/KG (ref 275–295)
PHOSPHATE SERPL-MCNC: 6.5 MG/DL (ref 2.4–5.1)
PLATELET # BLD AUTO: 149 10(3)UL (ref 150–450)
POTASSIUM SERPL-SCNC: 4.7 MMOL/L (ref 3.5–5.1)
RBC # BLD AUTO: 3.34 X10(6)UL
SODIUM SERPL-SCNC: 139 MMOL/L (ref 136–145)
TRIGL SERPL-MCNC: 130 MG/DL (ref 30–149)
VLDLC SERPL CALC-MCNC: 21 MG/DL (ref 0–30)
WBC # BLD AUTO: 7 X10(3) UL (ref 4–11)

## 2024-07-31 PROCEDURE — 0D5K8ZZ DESTRUCTION OF ASCENDING COLON, VIA NATURAL OR ARTIFICIAL OPENING ENDOSCOPIC: ICD-10-PCS | Performed by: INTERNAL MEDICINE

## 2024-07-31 PROCEDURE — 0DJ08ZZ INSPECTION OF UPPER INTESTINAL TRACT, VIA NATURAL OR ARTIFICIAL OPENING ENDOSCOPIC: ICD-10-PCS | Performed by: INTERNAL MEDICINE

## 2024-07-31 PROCEDURE — 99233 SBSQ HOSP IP/OBS HIGH 50: CPT | Performed by: HOSPITALIST

## 2024-07-31 PROCEDURE — 5A1D70Z PERFORMANCE OF URINARY FILTRATION, INTERMITTENT, LESS THAN 6 HOURS PER DAY: ICD-10-PCS | Performed by: INTERNAL MEDICINE

## 2024-07-31 PROCEDURE — 99233 SBSQ HOSP IP/OBS HIGH 50: CPT | Performed by: INTERNAL MEDICINE

## 2024-07-31 PROCEDURE — 0D5L8ZZ DESTRUCTION OF TRANSVERSE COLON, VIA NATURAL OR ARTIFICIAL OPENING ENDOSCOPIC: ICD-10-PCS | Performed by: INTERNAL MEDICINE

## 2024-07-31 RX ORDER — HYDROMORPHONE HYDROCHLORIDE 1 MG/ML
0.2 INJECTION, SOLUTION INTRAMUSCULAR; INTRAVENOUS; SUBCUTANEOUS EVERY 5 MIN PRN
Status: DISCONTINUED | OUTPATIENT
Start: 2024-07-31 | End: 2024-07-31 | Stop reason: HOSPADM

## 2024-07-31 RX ORDER — ONDANSETRON 2 MG/ML
4 INJECTION INTRAMUSCULAR; INTRAVENOUS EVERY 6 HOURS PRN
Status: DISCONTINUED | OUTPATIENT
Start: 2024-07-31 | End: 2024-07-31 | Stop reason: HOSPADM

## 2024-07-31 RX ORDER — NALOXONE HYDROCHLORIDE 0.4 MG/ML
80 INJECTION, SOLUTION INTRAMUSCULAR; INTRAVENOUS; SUBCUTANEOUS AS NEEDED
Status: DISCONTINUED | OUTPATIENT
Start: 2024-07-31 | End: 2024-07-31 | Stop reason: HOSPADM

## 2024-07-31 RX ORDER — SODIUM CHLORIDE, SODIUM LACTATE, POTASSIUM CHLORIDE, CALCIUM CHLORIDE 600; 310; 30; 20 MG/100ML; MG/100ML; MG/100ML; MG/100ML
INJECTION, SOLUTION INTRAVENOUS CONTINUOUS
Status: DISCONTINUED | OUTPATIENT
Start: 2024-07-31 | End: 2024-08-01

## 2024-07-31 RX ORDER — METOCLOPRAMIDE HYDROCHLORIDE 5 MG/ML
5 INJECTION INTRAMUSCULAR; INTRAVENOUS EVERY 8 HOURS PRN
Status: DISCONTINUED | OUTPATIENT
Start: 2024-07-31 | End: 2024-07-31 | Stop reason: HOSPADM

## 2024-07-31 RX ORDER — HYDROMORPHONE HYDROCHLORIDE 1 MG/ML
0.6 INJECTION, SOLUTION INTRAMUSCULAR; INTRAVENOUS; SUBCUTANEOUS EVERY 5 MIN PRN
Status: DISCONTINUED | OUTPATIENT
Start: 2024-07-31 | End: 2024-07-31 | Stop reason: HOSPADM

## 2024-07-31 RX ORDER — MORPHINE SULFATE 10 MG/ML
6 INJECTION, SOLUTION INTRAMUSCULAR; INTRAVENOUS EVERY 10 MIN PRN
Status: DISCONTINUED | OUTPATIENT
Start: 2024-07-31 | End: 2024-07-31 | Stop reason: HOSPADM

## 2024-07-31 RX ORDER — METOPROLOL TARTRATE 1 MG/ML
2.5 INJECTION, SOLUTION INTRAVENOUS ONCE
Status: DISCONTINUED | OUTPATIENT
Start: 2024-07-31 | End: 2024-07-31 | Stop reason: HOSPADM

## 2024-07-31 RX ORDER — HYDRALAZINE HYDROCHLORIDE 20 MG/ML
4 INJECTION INTRAMUSCULAR; INTRAVENOUS
Status: COMPLETED | OUTPATIENT
Start: 2024-07-31 | End: 2024-07-31

## 2024-07-31 RX ORDER — MORPHINE SULFATE 4 MG/ML
2 INJECTION, SOLUTION INTRAMUSCULAR; INTRAVENOUS EVERY 10 MIN PRN
Status: DISCONTINUED | OUTPATIENT
Start: 2024-07-31 | End: 2024-07-31 | Stop reason: HOSPADM

## 2024-07-31 RX ORDER — LABETALOL HYDROCHLORIDE 5 MG/ML
10 INJECTION, SOLUTION INTRAVENOUS ONCE
Status: COMPLETED | OUTPATIENT
Start: 2024-07-31 | End: 2024-07-31

## 2024-07-31 RX ORDER — HYDROMORPHONE HYDROCHLORIDE 1 MG/ML
0.4 INJECTION, SOLUTION INTRAMUSCULAR; INTRAVENOUS; SUBCUTANEOUS EVERY 5 MIN PRN
Status: DISCONTINUED | OUTPATIENT
Start: 2024-07-31 | End: 2024-07-31 | Stop reason: HOSPADM

## 2024-07-31 RX ORDER — MORPHINE SULFATE 4 MG/ML
4 INJECTION, SOLUTION INTRAMUSCULAR; INTRAVENOUS EVERY 10 MIN PRN
Status: DISCONTINUED | OUTPATIENT
Start: 2024-07-31 | End: 2024-07-31 | Stop reason: HOSPADM

## 2024-07-31 NOTE — PAYOR COMM NOTE
--------------  ADMISSION REVIEW   7/30  Payor: [employer]  Subscriber #:  [id number]  Authorization Number: [id number]    Admit date: 7/30/24  Admit time:  9:19 AM       REVIEW DOCUMENTATION:    ED Provider Notes signed by Gabriela Allen MD at 7/30/2024 12:24 PM        Patient Seen in: [facility] ER      History     Chief Complaint   Patient presents with    GI Bleeding     Stated Complaint: Rectal bleeding    Subjective:   HPI    79 year old male with multiple medical issues including asthma, bph, dm, esrd on HD M/W/F, hld, htn, KRAIG, neuropathy, atrial fibrillation on Eliquis, CAD s/p stents, recurrent GI bleeding with h/o AVM requiring clips in June of this year who now presents with GI bleeding since yesterday. +dark red stool, some abdominal cramping. He denies fever/chills or vomiting. He last took his Eliquis last night. Pt states he stopped taking Plavix a few weeks ago after dc from last GI bleed.     Objective:   Past Medical History:    Anemia    Asthma (HCC)    Back problem    BPH (benign prostatic hyperplasia)    Calculus of kidney    Cataract    Diabetes (HCC)    ESRD (end stage renal disease) on dialysis (HCC)    Essential hypertension    High blood pressure    High cholesterol    History of blood transfusion    Hyperlipidemia    Neuropathy    hands and feet    KRAIG on CPAP    Renal disorder    Sleep apnea    Visual impairment    glasses    Vocal cord paralysis, unilateral partial       Positive for stated Chief Complaint: GI Bleeding    Other systems are as noted in HPI.  Constitutional and vital signs reviewed.      All other systems reviewed and negative except as noted above.    Physical Exam     ED Triage Vitals [07/30/24 0411]   /66   Pulse 110   Resp 20   Temp 98.1 °F (36.7 °C)   Temp src Oral   SpO2 100 %   O2 Device None (Room air)       Current Vitals:   Vital Signs  BP: (!) 169/63  Pulse: 67  Resp: 20  Temp: 98.1 °F (36.7 °C)  Temp src: Oral  MAP (mmHg):  93    Oxygen Therapy  SpO2: 99 %  O2 Device: None (Room air)    Physical Exam  Vitals and nursing note reviewed.   Constitutional:       General: He is not in acute distress.     Appearance: Normal appearance. He is well-developed. He is not ill-appearing, toxic-appearing or diaphoretic.   HENT:      Head: Normocephalic and atraumatic.   Eyes:      Conjunctiva/sclera: Conjunctivae normal.      Pupils: Pupils are equal, round, and reactive to light.   Cardiovascular:      Rate and Rhythm: Normal rate and regular rhythm.      Pulses: Normal pulses.      Heart sounds: Normal heart sounds. No murmur heard.  Pulmonary:      Effort: Pulmonary effort is normal. No respiratory distress.      Breath sounds: Normal breath sounds. No wheezing.   Abdominal:      General: There is no distension.      Palpations: Abdomen is soft.      Tenderness: There is no abdominal tenderness. There is no guarding.   Genitourinary:     Comments: Stool output in toilet collection hat shows dark maroon jelly-like stool.  Musculoskeletal:         General: No tenderness. Normal range of motion.      Cervical back: Full passive range of motion without pain, normal range of motion and neck supple. No rigidity. Normal range of motion.      Right lower leg: No edema.      Left lower leg: No edema.   Skin:     General: Skin is warm and dry.      Findings: No rash.   Neurological:      Mental Status: He is alert and oriented to person, place, and time.      GCS: GCS eye subscore is 4. GCS verbal subscore is 5. GCS motor subscore is 6.      Sensory: Sensation is intact. No sensory deficit.      Motor: Motor function is intact. No weakness.   Psychiatric:         Attention and Perception: Attention normal.         Mood and Affect: Mood normal.         Behavior: Behavior normal. Behavior is cooperative.           ED Course     Labs Reviewed   COMP METABOLIC PANEL (14) - Abnormal; Notable for the following components:       Result Value    Glucose 138 (*)      BUN 50 (*)     Creatinine 5.01 (*)     Calculated Osmolality 312 (*)     eGFR-Cr 11 (*)     All other components within normal limits   LIPASE - Abnormal; Notable for the following components:    Lipase 224 (*)     All other components within normal limits   POCT GLUCOSE - Abnormal; Notable for the following components:    POC Glucose  138 (*)     All other components within normal limits   CBC W/ DIFFERENTIAL - Abnormal; Notable for the following components:    RBC 3.78 (*)     HGB 11.4 (*)     HCT 35.7 (*)     RDW-SD 59.1 (*)     RDW 17.0 (*)     All other components within normal limits   CBC WITH DIFFERENTIAL WITH PLATELET    Narrative:     The following orders were created for panel order CBC With Differential With Platelet.  Procedure                               Abnormality         Status                     ---------                               -----------         ------                     CBC W/ DIFFERENTIAL[[id number]]          Abnormal            Final result                 Please view results for these tests on the individual orders.   HEMOGLOBIN + HEMATOCRIT   HEMOGLOBIN + HEMATOCRIT   TYPE AND SCREEN   RAINBOW DRAW LAVENDER   RAINBOW DRAW LIGHT GREEN   RAINBOW DRAW BLUE     EKG    Rate, intervals and axes as noted on EKG Report.  Rate: 104  Rhythm: Sinus Rhythm  Reading: sinus tachycardia    MDM      Pulse Ox: 99%, Normal, RA    Cardiac Monitor:   Pulse Readings from Last 1 Encounters:   07/30/24 67   , sinus, normal for rate and rhythm     Pt on eliquis and ASA, now with recurrent GI bleeding. Pt given protonix.     D/w Dr Granados - advises Dr Parmar pt will now go to Hospitalist  D/w Dr Julian - Hospitalist will admit  D/w Dr Saldana - will consult  D/w [facility] - will consult  D/w Dr Musa Sheppard - will consult    Admission disposition: 7/30/2024  7:52 AM      Clinical Impression:  1. Lower GI bleeding    2. ESRD on hemodialysis (HCC)         Disposition:  Admit  7/30/2024  7:52  am    Hospital Problems       Present on Admission  Date Reviewed: 6/27/2024            ICD-10-CM Noted POA    * (Principal) Lower GI bleeding K92.2 7/30/2024 Unknown    ESRD on hemodialysis (HCC) N18.6, Z99.2 7/30/2024 Unknown                     7/30 H&P    CHIEF COMPLAINT: Dark black stools     HISTORY OF PRESENT ILLNESS  This is a 79 year oldmale who presented complaining of dark black stools.  Patient stated the night prior to admission he began developing dark tarry stools.  Patient reports around 4 episodes of dark tarry stools since that time.  Of note, patient states he has had several previous admissions for similar issues.  At that time he underwent colonoscopy with clipping of active bleeding lesion.  Patient is on chronic anticoagulation with Eliquis for atrial fibrillation and is also on aspirin due to previous cardiac stent.  At time of interview, patient denies current abdominal pain, nausea or vomiting.  Patient did note still having dark tarry stools.  Denies current chest pain, shortness of breath, fevers or chills.          ASSESSMENT/PLAN       Lower GI bleeding  -Patient presenting with black tarry stools  -Patient on chronic anticoagulation with Eliquis and aspirin, currently holding.  -Hemoglobin noted to be 11.4  -Starting IV Protonix twice daily  -Light IV fluids  -Keep n.p.o.  -GI consulted, appreciate further recommendations     History of CAD  -Status post PCI in May 2024  -Continue statin, beta-blocker  -Holding aspirin  -Cardiology on consult, appreciate further recommendations     Paroxysmal atrial fibrillation  -Currently in sinus rhythm  -Rate at goal  -On chronic anticoagulation with Eliquis, holding  -Cardiology on consult, appreciate further recommendations     Chronic heart failure with preserved ejection fraction   -Without current signs of acute exacerbation  - Strict I&Os, Daily weights, Fluid restriction  - Cardiology on consult, appreciate recs.      ESRD on HD  -Nephrology  on consult, appreciate recommendations for further HD     HTN  - controlled  - CPM  - Monitor and adjust as needed     Hyperlipidemia  -Continue statin     Addendum: Discussed with GI.  Recommend further evaluation with CT angiogram.  Pending results may need further endoscopy evaluation.           7/30 Cardiology    Reason for Consultation:  GIB, AF, CAD s/p PCI 5/2024     History of Present Illness:  Ollie Hernández is a a(n) 79 year old male with pmh AF on eliquis, CAD s/p PCI Lcx 5/2024, HFpEF, ESRD on HD, HTN, HL presenting with GIB.  Of note, patient with two admissions in 6/2024 following PCI 5/2024 with GI bleeding.  S/p EGD and c-scope with intervention to bleeding lesion in hepatic flexure vs transverse colon on each occasion.  States yesterday evening noticed dark/bright red stool prompting ER admission.  Denies chest pain, palpitations, orthopnea, PND or LE edema.  Denies syncope or near syncope. +Chronic, stable dyspnea on exertion.  Recently transitioned from plavix/eliquis to asa/eliquis.       On admission patient afeb, , /66.  Hgb 11.4.  ECG with ST with PVCs.  Cardiology consulted for further eval and management.           Assessment/Plan:  79 year old male presenting with:     1) GI bleeding (two similar admissions 6/2024 with intervention to bleeding lesion hepatic flexure vs transverse colon on each admission)  2) CAD s/p PCI Lcx 5/2024  3) pAF on eliquis  4) HFpEF  5) ESRD on HD  6) HTN  7) HL     - Cont statin, bb, hydralazine  - Asa and eliquis on hold; resume asa as soon as safely possible from GI standpoint (risk of stent thrombosis off asa and stroke off eliquis discussed with patient in detail)  - Consider outpatient eval for Watchman given recurrent GI bleeding and now long term indication for antiplatelet/anticoagulant therapy  - GI consult pending  - Monitor on tele            7/30 Nephrology        ESRD on HD      History of Present Illness:      Patient is a 79 year old  male with past medical history of coronary artery disease s/p PCI on 5/21, A-fib on anticoagulation, ESRD on hemodialysis Monday Wednesday Friday via AV fistula, hypertension who presented to the emergency room for rectal bleeding and maroon-colored stools      Patient was hospitalized twice in June for similar issues. Follows with Dr. Martinez and Dr. Pugh outpt at [facility] [place] for HD . Last HD was yesterday      Patient had PCI in May and was on plavix. Given recurrent bleed plavix was stopped and now on ASA and on eliquis for A-fib.      Had one episode in ED just prior to arrival to floor       Patient is a 79 year old male with past medical history of coronary artery disease s/p PCI on 5/21, A-fib on anticoagulation, ESRD on hemodialysis Monday Wednesday Friday via AV fistula, hypertension who presented to the emergency room for rectal bleeding and maroon-colored stools     1.ESRD: On HD  HD through AV fistula.  HD at [place] outpatient Monday Wednesday Friday  Next HD tomm  Check serum phosphorus     2.GI bleed: recurrent .  GI consulted-input noted  EGD and colonoscopy on 6/3 and colonoscopy on 6/21  GI consulted      3.coronary artery disease s/p PCI on 5/21/ P-Afib  Cardiology input noted. Off of plavix. On ASA and eliquis - currently on hold  High risk of stent thrombosis and stroke             7/30 GI    Reason for Consultation:   Acute Gi bleeding  Recurrent GI bleed     History of Present Illness:   Patient is a 79 year old male with past medical history significant for end-stage renal disease on hemodialysis, asthma, KRAIG, hypertension, hyperlipidemia, chronic anemia, history of lower GI bleed with possible AVM versus dieulafoy's lesion in the hepatic flexure s/p APC and Clips in 6/2024 admitted again with evidence of maroon-colored stools.    Patient has had evaluation for anemia in the past.  He underwent EGD colonoscopy as well as capsule endoscopy in 2016 for iron deficiency anemia.  This was  all essentially negative.     He was admitted in June 2024 with maroon-colored stools.  He underwent EGD and colonoscopy with me.  EGD was unremarkable.  Colonoscopy revealed evidence of a bleeding lesion at the level of the the hepatic flexure versus proximal transverse colon that was felt to be possible AVM versus dieulafoy's lesion.  This was cauterized with APC and then clipped X 1.  He achieved hemostasis at that time.  He returned to the hospital 2 weeks later with repeated episode of lower GI bleed.  He underwent repeat colonoscopy on Bianca 15, 2024 with Dr. Storey.  Evidence of previous clip was present in the hepatic flexure region.  This was not bleeding.  Red spot was noted.  This was clipped again with APC.  He had no further evidence of bleeding and was sent home.     He returns today after developing rectal bleeding last night into this morning.  He has had 3 episodes.  Initially darker maroon but now more brighter red.  No abdominal pain.  No preceding diarrhea.  No fevers.  No lightheadedness or dizziness.  In the ER patient was noted to have a hemoglobin of 11.4 which is improved from his last hemoglobin on 7/4/2024.  Which was 10.5 at that time.         Impression:   Recurrent GI bleeding  Maroon stools  3.  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  S/p APC and Clip.   -Repeat evidence of bleeding 6/15/2024 status post colonoscopy with no active bleeding noted.  No additional clip placed at the previous hepatic flexure site.  -Etiology of this current bleed is unclear however suspect it may be from the same hepatic flexure site or another AVM cannot completely rule out an upper GI source or small bowel source however his previous evaluations did not reveal any other abnormalities.     4.  ESRD on hemodialysis (HCC)              Recommendations:  Monitor H&H transfuse necessary  Stat CT angio of the abdomen pelvis see if we can identify a source as he has had  recent bleeding within the last hour.   Will need to consider repeat endoscopic evaluation especially given the need for anticoagulation.   Clear liquid diet can be considered if remains stable              MEDICATIONS ADMINISTERED IN LAST 1 DAY:  atorvastatin (Lipitor) tab 40 mg       Date Action Dose Route User    7/30/2024 2105 Given 40 mg Oral Elayne Grimm RN          carvedilol (Coreg) tab 25 mg       Date Action Dose Route User    7/30/2024 2105 Given 25 mg Oral Elayne Grimm RN          fluticasone propionate (Flonase) 50 MCG/ACT nasal suspension 2 spray       Date Action Dose Route User    7/31/2024 0900 Given 2 spray Nasal (Bilateral Nares) Abraham Evans RN          fluticasone-salmeterol (Advair Diskus) 250-50 MCG/ACT inhaler 1 puff       Date Action Dose Route User    7/31/2024 0900 Given 1 puff Inhalation Abraham Evans RN          hydrALAZINE (Apresoline) tab 25 mg       Date Action Dose Route User    7/31/2024 0609 Given 25 mg Oral Elayne Grimm RN    7/30/2024 2105 Given 25 mg Oral Elayne Grimm RN    7/30/2024 1441 Given 25 mg Oral Abraham Evans RN          labetalol (Trandate) 5 mg/mL injection 10 mg       Date Action Dose Route User    7/31/2024 0532 Given 10 mg Intravenous Elayne Grimm RN          pantoprazole (Protonix) 40 mg in sodium chloride 0.9% PF 10 mL IV push       Date Action Dose Route User    7/31/2024 1027 Given 40 mg Intravenous Abraham Evans RN    7/30/2024 2107 Given 40 mg Intravenous Elayne Grimm RN            Vitals (last day)       Date/Time Temp Pulse Resp BP SpO2 Weight O2 Device O2 Flow Rate (L/min) Who    07/31/24 1258 -- 69 16 153/51 99 % -- None (Room air) -- MM    07/31/24 0845 98.7 °F (37.1 °C) 82 20 178/66 99 % -- None (Room air) -- FL    07/31/24 0700 -- 69 -- -- -- -- -- -- RW    07/31/24 0608 -- 71 -- 198/72 -- -- -- -- KG    07/31/24 0526 -- 76 -- 183/62 -- -- -- -- KG    07/31/24 0524 -- 73 --  181/68 -- -- -- -- KG    07/31/24 0521 -- 74 -- 182/69 -- -- -- -- KG    07/31/24 0513 97.6 °F (36.4 °C) 73 20 184/70 99 % -- None (Room air) -- KG    07/31/24 0410 -- 78 22 -- 97 % -- -- -- NG    07/31/24 0401 -- -- -- -- -- 145 lb 6.4 oz (66 kg) -- -- LV    07/31/24 0300 -- 81 -- -- -- -- -- -- GT    07/30/24 2247 -- 71 23 168/65 97 % -- CPAP -- KG    07/30/24 2102 97.8 °F (36.6 °C) 68 16 182/71 99 % -- None (Room air) -- KG    07/30/24 2005 -- 72 -- -- 98 % -- -- -- NG    07/30/24 1905 -- 76 -- -- -- -- -- -- GT    07/30/24 1540 -- -- -- 168/65 -- -- -- -- FL    07/30/24 1430 97.6 °F (36.4 °C) 70 20 181/65 98 % -- None (Room air) -- FL    07/30/24 1026 -- 67 -- -- -- -- -- -- KP    07/30/24 1013 -- -- -- -- -- 145 lb 6.4 oz (66 kg) -- -- RS    07/30/24 0926 98.1 °F (36.7 °C) 67 20 169/63 99 % -- None (Room air) -- RS    07/30/24 0830 -- 67 18 156/62 100 % -- None (Room air) -- TF    07/30/24 0730 -- 67 15 144/58 100 % -- None (Room air) -- TF    07/30/24 0700 -- 67 21 150/57 100 % -- -- -- MMA    07/30/24 0645 -- 66 16 142/59 99 % -- None (Room air) --     07/30/24 4365 -- 102 23 161/78 99 % -- None (Room air) --     07/30/24 0411 98.1 °F (36.7 °C) 110 20 150/66 100 % 157 lb (71.2 kg) None (Room air) -- DL

## 2024-07-31 NOTE — PROGRESS NOTES
[facility]  part of [facility]    Progress Note      Subjective:     Patient n.p.o. for endoscopic evaluation-currently working on bowel prep.  On IV fluid.  Denies any chest pain or shortness of breath or abdominal pain      Review of Systems:     Constitutional: negative for fatigue, fevers and weight loss  Eyes: negative for irritation, redness and visual disturbance  Ears, nose, mouth, throat, and face: negative for hearing loss and sore throat  Respiratory: negative for cough, hemoptysis and wheezing  Cardiovascular: negative for chest pain, exertional dyspnea, lower extremity edema and palpitations  Gastrointestinal: Positive GI bleed  Genitourinary:negative for dysuria, frequency and hematuria  Hematologic/lymphatic: negative for bleeding and easy bruising  Musculoskeletal:negative for back pain, bone pain and muscle weakness  Neurological: negative for gait problems, memory problems and seizures  Behavioral/Psych: negative for anxiety and depression    Objective:   Temp:  [97.6 °F (36.4 °C)-98.7 °F (37.1 °C)] 98.7 °F (37.1 °C)  Pulse:  [68-82] 82  Resp:  [16-23] 20  BP: (168-198)/(62-72) 178/66  SpO2:  [97 %-99 %] 99 %  SpO2: 99 %     Intake/Output Summary (Last 24 hours) at 7/31/2024 1222  Last data filed at 7/31/2024 0900  Gross per 24 hour   Intake 0 ml   Output 1 ml   Net -1 ml     Wt Readings from Last 3 Encounters:   07/31/24 145 lb 6.4 oz (66 kg)   06/27/24 163 lb (73.9 kg)   06/13/24 156 lb 8 oz (71 kg)       General appearance: alert, appears stated age and cooperative  Head: Normocephalic, atraumatic  Eyes: conjunctivae/corneas clear  Throat: lips, mucosa, and tongue normal; teeth and gums normal  Neck:  no JVD, supple  Extremities: extremities normal, no edema  Skin: No rashes or lesions  Neurologic: Grossly normal  Psychiatric: calm    Medications:  Current Facility-Administered Medications   Medication Dose Route Frequency    atorvastatin (Lipitor) tab 40 mg  40 mg Oral  Nightly    fluticasone-salmeterol (Advair Diskus) 250-50 MCG/ACT inhaler 1 puff  1 puff Inhalation BID    escitalopram (Lexapro) tab 5 mg  5 mg Oral Daily    fluticasone propionate (Flonase) 50 MCG/ACT nasal suspension 2 spray  2 spray Nasal Daily    traMADol (Ultram) tab 50 mg  50 mg Oral Q12H PRN    glucose (Dex4) 15 GM/59ML oral liquid 15 g  15 g Oral Q15 Min PRN    Or    glucose (Glutose) 40% oral gel 15 g  15 g Oral Q15 Min PRN    Or    glucose-vitamin C (Dex-4) chewable tab 4 tablet  4 tablet Oral Q15 Min PRN    Or    dextrose 50% injection 50 mL  50 mL Intravenous Q15 Min PRN    Or    glucose (Dex4) 15 GM/59ML oral liquid 30 g  30 g Oral Q15 Min PRN    Or    glucose (Glutose) 40% oral gel 30 g  30 g Oral Q15 Min PRN    Or    glucose-vitamin C (Dex-4) chewable tab 8 tablet  8 tablet Oral Q15 Min PRN    ondansetron (Zofran) 4 MG/2ML injection 4 mg  4 mg Intravenous Q6H PRN    sodium chloride 0.9% infusion   Intravenous Continuous    acetaminophen (Tylenol Extra Strength) tab 500 mg  500 mg Oral Q4H PRN    pantoprazole (Protonix) 40 mg in sodium chloride 0.9% PF 10 mL IV push  40 mg Intravenous Q12H    insulin aspart (NovoLOG) 100 Units/mL FlexPen 1-5 Units  1-5 Units Subcutaneous TID CC and HS    carvedilol (Coreg) tab 25 mg  25 mg Oral BID    hydrALAZINE (Apresoline) tab 25 mg  25 mg Oral Q8H STEPHANIE    sodium chloride 0.9 % IV bolus 100 mL  100 mL Intravenous Q30 Min PRN    And    albumin human (Albumin) 25% injection 25 g  25 g Intravenous PRN Dialysis    polyethylene glycol-electrolyte (Golytely) 236 g oral solution 4,000 mL  4,000 mL Oral Once              Results:     Recent Labs   Lab 07/30/24  0441 07/30/24  1244 07/30/24  1748 07/31/24  0019 07/31/24  0718   RBC 3.78*  --   --   --  3.34*   HGB 11.4*   < > 11.1* 8.6* 10.1*   HCT 35.7*   < > 32.8* 27.2* 31.5*   MCV 94.4  --   --   --  94.3   NEPRELIM 3.90  --   --   --  5.40   WBC 5.5  --   --   --  7.0   .0  --   --   --  149.0*    < > = values in  this interval not displayed.     Recent Labs   Lab 07/30/24  0441 07/31/24  0718   * 136*   BUN 50* 50*   CREATSERUM 5.01* 5.72*   CA 9.3 9.7   ALB 3.5  --     139   K 4.5 4.7    105   CO2 30.0 23.0   ALKPHO 66  --    AST 29  --    ALT 33  --    BILT 0.3  --    TP 6.5  --      No results found for: \"PTT\", \"INR\"  No results for input(s): \"BNP\" in the last 168 hours.  Recent Labs   Lab 07/31/24  0718   MG 1.7        Recent Labs   Lab 07/30/24  0441   ALB 3.5       CTA ABD/PEL (CPT=74174)    Result Date: 7/30/2024  CONCLUSION: No active GI bleed    Dictated by (CST): Osmar Ortega MD on 7/30/2024 at 2:41 PM     Finalized by (CST): Osmar Ortega MD on 7/30/2024 at 2:48 PM         EKG 12 Lead    Result Date: 7/30/2024  Sinus tachycardia with occasional Premature ventricular complexes Otherwise normal ECG When compared with ECG of 21-MAY-2024 12:28, Premature ventricular complexes are now Present Premature atrial complexes are no longer Present Vent. rate has increased BY  39 BPM Confirmed by WILVER SHABAZZ ([id number]) on 7/30/2024 12:29:29 PM     Assessment and Plan:       Patient is a 79 year old male with past medical history of coronary artery disease s/p PCI on 5/21, A-fib on anticoagulation, ESRD on hemodialysis Monday Wednesday Friday via AV fistula, hypertension who presented to the emergency room for rectal bleeding and maroon-colored stools     1.ESRD: On HD  HD through AV fistula.  HD at [place] outpatient Monday Wednesday Friday  HD today-after colonoscopy  Check serum phosphorus     2.GI bleed: recurrent .  GI consulted-input noted  S/p CTA of abdomen-no active GI bleed noted  N.p.o. for endoscopic evaluation today  EGD and colonoscopy on 6/3 and colonoscopy on 6/21  Anemia: Secondary to GI bleed.  Continue to monitor for VANCE need     3.coronary artery disease s/p PCI on 5/21/ P-Afib  Cardiology input noted. Off of plavix. On ASA and eliquis - currently on hold  High risk of stent thrombosis  and stroke      Discussed with nursing and wife at bedside    Walker Sheppard MD  7/31/2024

## 2024-07-31 NOTE — PROGRESS NOTES
Bp elevated 205/77 post procedure. Patient asymptomatic for chest pain , dizziness and SOB. Dr Cordon from anesthesia notified, obtained new order for Hydralazine 4mg one time and repeat In 15 min if necessary..    BP after administrating Hydralazine 4mg 203/66. Administered second dose of Hydralazine 4mg per orders. After second dose /65 trending down. Patient ready to be transported back to unit.

## 2024-07-31 NOTE — ANESTHESIA POSTPROCEDURE EVALUATION
Patient: Ollie Hernández    Procedure Summary       Date: 07/31/24 Room / Location: [facility] ENDOSCOPY 01 / [facility] ENDOSCOPY    Anesthesia Start: 1424 Anesthesia Stop:     Procedures:       ESOPHAGOGASTRODUODENOSCOPY (EGD)      COLONOSCOPY Diagnosis: (Normal EGD; diverticulosis, previous clip, colon red spots)    Surgeons: Gabriel Saldana MD Anesthesiologist: lAbert Hopkins MD    Anesthesia Type: MAC ASA Status: 3            Anesthesia Type: MAC    Vitals Value Taken Time   /67 07/31/24 1505   Temp 98 07/31/24 1505   Pulse 67 07/31/24 1505   Resp 12 07/31/24 1505   SpO2 99 07/31/24 1505       [facility] AN Post Evaluation:   Patient Evaluated in floor  Patient Participation: complete - patient participated  Level of Consciousness: awake  Pain Management: adequate  Airway Patency:patent  Dental exam unchanged from preop  Yes    Cardiovascular Status: acceptable  Respiratory Status: acceptable  Postoperative Hydration acceptable      ALBERT HOPKINS MD  7/31/2024 3:05 PM

## 2024-07-31 NOTE — PROGRESS NOTES
[facility]  part of [facility]    Progress Note    Ollie Hernández Patient Status:  Inpatient    1945 MRN [id number]   Location [facility] ENDOSCOPY LAB SUITES Attending Darlin Phipps MD   Hosp Day # 1 PCP Wili Parmar MD     Chief Complaint:     Lower GI bleeding    Subjective:   Subjective:    Patient seen and examined  Underwent Scope today  Afebrile, normotensive.    Objective:   Blood pressure 143/74, pulse 71, temperature 98.7 °F (37.1 °C), temperature source Oral, resp. rate 21, height 5' 4\" (1.626 m), weight 145 lb 6.4 oz (66 kg), SpO2 98%.  Physical Exam    General: Patient is alert and oriented x3 does not appear to be in acute distress at this time  HEENT: EOMI PERRLA, atraumatic normocephalic  Cardiac: S1-S2 appreciated  Lungs: Good air entry bilaterally clear to auscultation  Abdomen: Soft nontender nondistended positive bowel sounds  Ext: Peripheral pulses are positive  Neuro: No focal deficits noted  Psych: Normal mood  Skin: No rashes noted  MSK: Full range of motion intact      Results:   Lab Results   Component Value Date    WBC 7.0 2024    HGB 10.1 (L) 2024    HCT 31.5 (L) 2024    .0 (L) 2024    CREATSERUM 5.72 (H) 2024    BUN 50 (H) 2024     2024    K 4.7 2024     2024    CO2 23.0 2024     (H) 2024    CA 9.7 2024    ALB 3.5 2024    ALKPHO 66 2024    BILT 0.3 2024    TP 6.5 2024    AST 29 2024    ALT 33 2024    T4F 0.9 2022    TSH 2.844 2024     (H) 2024    PSA 2.94 10/20/2021    ESRML 10 2024    CRP 1.30 (H) 2024    MG 1.7 2024    PHOS 6.5 (H) 2024    TROP <0.045 2019    TROPHS 25 2022     2023    B12 672 2024       CTA ABD/PEL (CPT=74174)    Result Date: 2024  CONCLUSION: No active GI bleed    Dictated by (CST): Osmar Ortega MD on  7/30/2024 at 2:41 PM     Finalized by (CST): Osmar Ortega MD on 7/30/2024 at 2:48 PM         EKG 12 Lead    Result Date: 7/30/2024  Sinus tachycardia with occasional Premature ventricular complexes Otherwise normal ECG When compared with ECG of 21-MAY-2024 12:28, Premature ventricular complexes are now Present Premature atrial complexes are no longer Present Vent. rate has increased BY  39 BPM Confirmed by WILVER SHABAZZ ([id number]) on 7/30/2024 12:29:29 PM     Assessment & Plan:       Lower GI bleeding  -Patient presenting with black tarry stools  -Patient on chronic anticoagulation with Eliquis and aspirin, currently holding.  -Hemoglobin noted to be 11.4  -Starting IV Protonix twice daily  -Light IV fluids  -Keep n.p.o.  -GI consulted, appreciate further recommendations     History of CAD  -Status post PCI in May 2024  -Continue statin, beta-blocker  -Holding aspirin  -Cardiology on consult, appreciate further recommendations     Paroxysmal atrial fibrillation  -Currently in sinus rhythm  -Rate at goal  -On chronic anticoagulation with Eliquis, holding  -Cardiology on consult, appreciate further recommendations     Chronic heart failure with preserved ejection fraction   -Without current signs of acute exacerbation  - Strict I&Os, Daily weights, Fluid restriction  - Cardiology on consult, appreciate recs.      ESRD on HD  -Nephrology on consult, appreciate recommendations for further HD     HTN  - controlled  - CPM  - Monitor and adjust as needed     Hyperlipidemia  -Continue statin    VTE ppx: SCD    Global A/P  -Reviewed previous consultant notes  -Reviewed CBC, BMP, Mag, and Phos  -Reviewed tests ordered  -Repeat labs in am  -MDM: High, severe exacerbation of chronic illness posing a threat to life. IV medications requiring close inpatient monitoring.   -s/p scope today with cauterization.  -hgb stable  -appreciate recs, will continue to monitor.    **Certification      PHYSICIAN Certification of Need for  Inpatient Hospitalization - Initial Certification    Patient will require inpatient services that will reasonably be expected to span two midnight's based on the clinical documentation in H+P.   Based on patients current state of illness, I anticipate that, after discharge, patient will require TBD.      Darlin Phipps MD

## 2024-07-31 NOTE — PHYSICAL THERAPY NOTE
Physical Therapy Contact Note    Orders received and chart reviewed. Attempted to see patient for Physical Therapy services at 0845. Patient not available secondary to patient requesting therapy re-attempt at a later time, in process of bowel prep and then will have dialysis later. Able to obtain pt's PLOF and living environment info, per chart review pt with a couple recent admissions for GI bleed, was Sara using a walker, D/C'ed home with no therapy needs.      07/31/24 0845   VISIT TYPE   PT Inpatient Visit Type (Documentation Required) Attempted Evaluation   Patient Background   Presenting Problem GI bleed, ESRD on HD   Co-Morbidities  ESRD on HD, CAD, anemia, asthma, DM II, HTN, HLD, neuropathy, KRAIG   Physician Order PT Eval and Treat   Precautions Limb alert - right   Fall Risk Standard fall risk   Weight Bearing Restriction None   Patient Owned Equipment Rolling walker;Cane   Home Situation   Type of Home House   Home Layout One level   Stairs to Enter  5   Railing Yes   Lives With Spouse   Drives Yes   Vital Signs   Pulse 82   Heart Rate Source Monitor   Resp 20   BP (!) 178/66   BP Location Left arm   BP Method Automatic   Patient Position Sitting   Oxygen Therapy   SPO2% on Room Air at Rest 97     Will re-attempt pending patient availability/appropriateness as schedule allows, otherwise will re-schedule visit.    Daria Rivas, PT, DPT  [facility]  Rehab Services - Physical Therapy  [phone]

## 2024-07-31 NOTE — ANESTHESIA PREPROCEDURE EVALUATION
Anesthesia PreOp Note    HPI:     Ollie Hernández is a 79 year old male who presents for preoperative consultation requested by: Gabriel Saldana MD    Date of Surgery: 7/30/2024 - 7/31/2024    Procedure(s):  ESOPHAGOGASTRODUODENOSCOPY (EGD)  COLONOSCOPY  Indication: recurrent GI bleed    Relevant Problems   No relevant active problems       NPO:  Last Liquid Consumption Date: 07/31/24  Last Liquid Consumption Time: 1000  Last Solid Consumption Date: 07/30/24  Last Solid Consumption Time: 1500  Last Liquid Consumption Date: 07/31/24          History Review:  Patient Active Problem List    Diagnosis Date Noted    Lower GI bleeding 07/30/2024    ESRD on hemodialysis ([facility]) 07/30/2024    Rectal bleeding 06/13/2024    Anticoagulated 06/13/2024    Antiplatelet or antithrombotic long-term use 06/13/2024    Atrial fibrillation ([facility]) 06/05/2024    AVM (arteriovenous malformation) of colon 06/05/2024    Anemia, blood loss 06/05/2024    Lower GI bleed 06/01/2024    ESRD (end stage renal disease) on dialysis ([facility]) 06/01/2024    Unstable angina ([facility]) 05/22/2024    Smokers' cough ([facility]) 02/29/2024    Primary hypertension     Secondary hyperparathyroidism ([facility]) 02/10/2022    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 ([facility]) 02/10/2022    Atherosclerosis of native arteries of extremities with intermittent claudication, bilateral legs ([facility]) 02/10/2022    Chronic obstructive asthma ([facility]) 11/14/2021    Vitamin D deficiency 02/01/2021    Chronic diastolic congestive heart failure ([facility]) 03/02/2020    Anemia of chronic renal failure 12/04/2019    KRAIG (obstructive sleep apnea) 04/25/2017    Pulmonary HTN ([facility]) 03/08/2016    Mixed hyperlipidemia 02/11/2016    Gout 11/25/2013    Type 2 diabetes mellitus with chronic kidney disease on chronic dialysis, with long-term current use of insulin ([facility]) 03/10/2005       Past Medical History:    Anemia    Asthma ([facility])     Back problem    BPH (benign prostatic hyperplasia)    Calculus of kidney    Cataract    Coronary atherosclerosis    Diabetes (H[facility])    ESRD (end stage renal disease) on dialysis ([facility])    Essential hypertension    High blood pressure    High cholesterol    History of blood transfusion    Hyperlipidemia    Neuropathy    hands and feet    KRAIG on CPAP    Renal disorder    Sleep apnea    Visual impairment    glasses    Vocal cord paralysis, unilateral partial       Past Surgical History:   Procedure Laterality Date    Appendectomy      1981    Back surgery      Neck/back - 1998    Capsule endoscopy - internal referral      Cataract      12/2021 and 1/2022    Cath bare metal stent (bms)      Colonoscopy      Colonoscopy N/A 01/25/2021    Procedure: COLONOSCOPY;  Surgeon: Michael Gautam MD;  Location: [facility] ENDO    Colonoscopy N/A 06/03/2024    Procedure: COLONOSCOPY;  Surgeon: Gabriel Saldana MD;  Location: [facility] ENDOSCOPY    Colonoscopy N/A 06/15/2024    Procedure: COLONOSCOPY;  Surgeon: Carlyn Storey MD;  Location: [facility] ENDOSCOPY    Hand/finger surgery unlisted      Accidental trauma    Spine surgery procedure unlisted      Upper gi endoscopy,diagnosis         Medications Prior to Admission   Medication Sig Dispense Refill Last Dose    aspirin 81 MG Oral Tab EC Take 1 tablet (81 mg total) by mouth daily. 90 tablet 3 7/29/2024 at AM    atorvastatin 40 MG Oral Tab Take 1 tablet (40 mg total) by mouth nightly. 90 tablet 3 7/29/2024 at PM    apixaban 5 MG Oral Tab Take 1 tablet (5 mg total) by mouth 2 (two) times daily.   7/29/2024 at AM    Budesonide-Formoterol Fumarate (SYMBICORT) 160-4.5 MCG/ACT Inhalation Aerosol Inhale 2 puffs into the lungs 2 (two) times daily. 3 each 3 7/29/2024 at AM    PANTOPRAZOLE 40 MG Oral Tab EC TAKE 1 TABLET BY MOUTH EVERY  MORNING BEFORE BREAKFAST 90 tablet 3     linaGLIPtin (TRADJENTA) 5 mg Oral Tab Take 1 tablet (5 mg total) by mouth daily. 90 tablet 1     escitalopram 5 MG Oral Tab Take 1  tablet (5 mg total) by mouth daily. 90 tablet 3     carvedilol 25 MG Oral Tab Take 1 tablet (25 mg total) by mouth 2 (two) times daily.       acetaminophen 500 MG Oral Tab Take 1 tablet (500 mg total) by mouth daily as needed for Pain.       cetirizine 10 MG Oral Tab Take 1 tablet (10 mg total) by mouth every other day.       Cholecalciferol 125 MCG (5000 UT) Oral Tab Take 1 tablet (5,000 Units total) by mouth 2 (two) times daily.       polyethylene glycol, PEG 3350, 17 g Oral Powd Pack 17 g Wed, Thurs, Sat       tetrahydrozoline 0.05 % Ophthalmic Solution 1 drop 2 (two) times daily as needed.       traMADol 50 MG Oral Tab Take 1 tablet (50 mg total) by mouth every 12 (twelve) hours as needed for Pain.       Glucose Blood (CONTOUR NEXT TEST) In Vitro Strip Test 3 times daily 300 each 1     felodipine ER 10 MG Oral Tablet 24 Hr Take 1 tablet (10 mg total) by mouth daily. 90 tablet 3     fluticasone propionate 50 MCG/ACT Nasal Suspension 2 sprays by Nasal route daily. 48 g 3     methocarbamol 500 MG Oral Tab Take 1 tablet (500 mg total) by mouth 2 (two) times daily as needed. 60 tablet 1     albuterol (PROAIR HFA) 108 (90 Base) MCG/ACT Inhalation Aero Soln Inhale 2 puffs into the lungs every 4 (four) hours as needed for Wheezing. 3 each 3     Darbepoetin Randell 60 MCG/ML Injection Solution Inject into the vein as needed.        Current Facility-Administered Medications Ordered in Epic   Medication Dose Route Frequency Provider Last Rate Last Admin    atorvastatin (Lipitor) tab 40 mg  40 mg Oral Nightly Abdifatah Mesa MD   40 mg at 07/30/24 2105    fluticasone-salmeterol (Advair Diskus) 250-50 MCG/ACT inhaler 1 puff  1 puff Inhalation BID Abdifatah Mesa MD   1 puff at 07/31/24 0900    escitalopram (Lexapro) tab 5 mg  5 mg Oral Daily Abdifatah Mesa MD   5 mg at 07/30/24 1108    fluticasone propionate (Flonase) 50 MCG/ACT nasal suspension 2 spray  2 spray Nasal Daily Abdifatah Mesa MD   2 spray at  07/31/24 0900    traMADol (Ultram) tab 50 mg  50 mg Oral Q12H PRN Abdifatah Mesa MD        glucose (Dex4) 15 GM/59ML oral liquid 15 g  15 g Oral Q15 Min PRN Abdifatah Mesa MD        Or    glucose (Glutose) 40% oral gel 15 g  15 g Oral Q15 Min PRN Abdifatah Mesa MD        Or    glucose-vitamin C (Dex-4) chewable tab 4 tablet  4 tablet Oral Q15 Min PRN Abdifatah Mesa MD        Or    dextrose 50% injection 50 mL  50 mL Intravenous Q15 Min PRN Abdifatah Mesa MD        Or    glucose (Dex4) 15 GM/59ML oral liquid 30 g  30 g Oral Q15 Min PRN Abdifatah Mesa MD        Or    glucose (Glutose) 40% oral gel 30 g  30 g Oral Q15 Min PRN Abdifatah Mesa MD        Or    glucose-vitamin C (Dex-4) chewable tab 8 tablet  8 tablet Oral Q15 Min PRN Abdifatah Mesa MD        ondansetron (Zofran) 4 MG/2ML injection 4 mg  4 mg Intravenous Q6H PRN Abdifatah Mesa MD        sodium chloride 0.9% infusion   Intravenous Continuous Abdifatah Mesa MD 75 mL/hr at 07/30/24 1022 New Bag at 07/30/24 1022    acetaminophen (Tylenol Extra Strength) tab 500 mg  500 mg Oral Q4H PRN Abdifatah Mesa MD        pantoprazole (Protonix) 40 mg in sodium chloride 0.9% PF 10 mL IV push  40 mg Intravenous Q12H Abdifatah Mesa MD   40 mg at 07/31/24 1027    insulin aspart (NovoLOG) 100 Units/mL FlexPen 1-5 Units  1-5 Units Subcutaneous TID CC and HS Abdifatah Mesa MD        carvedilol (Coreg) tab 25 mg  25 mg Oral BID Robel Saldana MD   25 mg at 07/30/24 2105    hydrALAZINE (Apresoline) tab 25 mg  25 mg Oral Q8H STEPHANIE Robel Saldana MD   25 mg at 07/31/24 0609    sodium chloride 0.9 % IV bolus 100 mL  100 mL Intravenous Q30 Min PRN Walker Klein MD        And    albumin human (Albumin) 25% injection 25 g  25 g Intravenous PRN Dialysis Walker Klein MD        polyethylene glycol-electrolyte (Golytely) 236 g oral solution 4,000 mL  4,000 mL Oral Once Gabriel Saldana MD         No current Epic-ordered  outpatient medications on file.       Allergies   Allergen Reactions    Adhesive Tape OTHER (SEE COMMENTS)     Severe rashes    Dust Mite Extract RASH       Family History   Problem Relation Age of Onset    Cancer Father         Kidney    Breast Cancer Mother     Diabetes Mother     Diabetes Maternal Grandmother     Diabetes Maternal Grandfather     Heart Disorder Other         Uncle     Social History     Socioeconomic History    Marital status:    Tobacco Use    Smoking status: Former     Current packs/day: 0.00     Average packs/day: 1 pack/day for 17.0 years (17.0 ttl pk-yrs)     Types: Cigarettes     Start date: 1964     Quit date: 1981     Years since quittin.6    Smokeless tobacco: Never   Vaping Use    Vaping status: Never Used   Substance and Sexual Activity    Alcohol use: No     Alcohol/week: 0.0 standard drinks of alcohol    Drug use: No    Sexual activity: Yes     Partners: Female       Available pre-op labs reviewed.  Lab Results   Component Value Date    WBC 7.0 2024    RBC 3.34 (L) 2024    HGB 10.1 (L) 2024    HCT 31.5 (L) 2024    MCV 94.3 2024    MCH 30.2 2024    MCHC 32.1 2024    RDW 16.8 (H) 2024    .0 (L) 2024     Lab Results   Component Value Date     2024    K 4.7 2024     2024    CO2 23.0 2024    BUN 50 (H) 2024    CREATSERUM 5.72 (H) 2024     (H) 2024    PGLU 139 (H) 2024    CA 9.7 2024          Vital Signs:  Body mass index is 24.96 kg/m².   height is 1.626 m (5' 4\") and weight is 66 kg (145 lb 6.4 oz). His oral temperature is 98.7 °F (37.1 °C). His blood pressure is 153/51 and his pulse is 69. His respiration is 16 and oxygen saturation is 99%.   Vitals:    24 0608 24 0700 24 0845 07/31/24 1258   BP: (!) 198/72  (!) 178/66 153/51   Pulse: 71 69 82 69   Resp:   20 16   Temp:   98.7 °F (37.1 °C)    TempSrc:   Oral    SpO2:    99% 99%   Weight:       Height:            Anesthesia Evaluation     Patient summary reviewed and Nursing notes reviewed    Airway   Mallampati: II  Dental      Pulmonary - negative ROS   Cardiovascular   Exercise tolerance: good    NYHA Classification: I    Neuro/Psych - negative ROS     GI/Hepatic/Renal - negative ROS     Endo/Other - negative ROS   Abdominal                  Anesthesia Plan:   ASA:  3  Plan:   MAC  Post-op Pain Management: IV analgesics      I have informed Ollie Hernández and/or legal guardian or family member of the nature of the anesthetic plan, benefits, risks including possible dental damage if relevant, major complications, and any alternative forms of anesthetic management.   All of the patient's questions were answered to the best of my ability. The patient desires the anesthetic management as planned.  ALBERT HOPKINS MD  7/31/2024 1:32 PM  Present on Admission:  **None**

## 2024-07-31 NOTE — OPERATIVE REPORT
EGD/Colonoscopy Operative Report    Ollie Hernández Patient Status:  Inpatient    1945 MRN [id number]   Location [facility] ENDOSCOPY LAB SUITES Attending Darlin Phipps MD   Hosp Day #   1 PCP Wili Parmar MD     Pre-Operative Diagnosis: recurrent GI bleed     Post-Operative Diagnosis:   Normal esophagus and Z-line, 1cm sliding hiatal hernia  Minimal gastric erythema otherwise normal gastric examination  Normal duodenum  Previously placed clip in the hepatic flexure region.  Clip attached to erythematous colon mucosa.  This was nonbleeding.  This was cauterized using APC.  No bleeding was noted before, during or after the procedure.   A couple of ascending colon red spots were cauterized but not bleeding or did not blood with contact.  I do not think that these are of any clinical significance.  Normal terminal ileum  A few scattered diverticulosis, nonbleeding  Small internal hemorrhoids      Procedure Performed:   EGD   COLONOSCOPY   Informed Consent: Informed consent for both the procedure and sedation were obtained from the patient. The potentially life-threatening complications of sedation, bleeding,  perforation, transfusion or repeat endoscopy were reviewed along with the possible need for hospitalization, surgical management, transfusion or repeat endoscopy should one of these complications arise. The patient understands and is agreeable to proceed.  Sedation Type: MAC-Patient received sedation with monitored anesthesia provided by an anesthesiologist    Moderate Sedation Time: None.  Deep sedation provided by anesthesia.    Cecum Withdrawal Time:  20 min    Date of previous colonoscopy:     Procedure Description: The patient was placed in the left lateral decubitus position. A bite block was placed in the patient’s mouth. The endoscope  was inserted through the mouth and advanced under direct visualization to the 3rd portion of the duodenum and was then withdrawn to examine the duodenal bulb and gastric antrum.  The endoscope was then retroflexed to examine the angulus, GE junction, cardia, body and fundus,  then withdrawn to examine the esophagus. The endoscope was then removed from the patient. The patient tolerated the procedure well with no immediate complications. The patient was then repositioned for colonoscopy.  After careful digital rectal examination, the Pediatric colonoscope was inserted into the rectum and advanced to the level of the cecum under direct visualization. The cecum was identified by landmarks, including the appendiceal orifice and ileoceccal valve. Careful examination of the entire colon was performed during withdrawal of the endoscope. The scope was withdrawn to the rectum and retroflexion was performed.  The patient tolerated the procedure well with no immediate complications. The patient was transferred to the recovery area in stable condition.   Quality of Preparation: Adequate  Aronchick Bowel Prep Scale:  Good - 2    Findings:    ESOPHAGUS:  Normal esophagus.  The Z-line and GE junction noted at 38cms from the incisors and was normal.  There was a 1cm sliding hiatal hernia noted.    STOMACH:  normal gastric examination except for minimal erythema.  No blood or bleeding source identified throughout the entire gastric examination.  Retroflexed view was unremarkable.   DUODEN normal duodenal bulb and normal postbulbar duodenum.  Scope advanced to the third portion of duodenum with no blood or bleeding source identified.   COLON:      1.  There was evidence of the previously placed clip in the hepatic flexure region.  Mucosa that it was attached to was erythematous, however no bleeding was noted.  This area was APC'd using standard right colon setting.  No bleeding was noted.    2.  There were a couple of red spots in  the ascending colon that were nonbleeding.  These were APC'd as well however no bleeding was noted.  These were not likely of any clinical significance.    3.  Normal terminal ileum.  No blood identified.    4.  The colon mucosa throughout was otherwise normal.  No blood was noted throughout the entire colon examination.  No other obvious bleeding source was identified.    5.  Small internal hemorrhoids    Recommendations:    Fiber supplementation such as benefiber or citrucel daily.   Monitor hgb  Would consider Watchman device if possible given his recurrent bleeding issues  Cause of this recent bleeding is unclear.  Possibly related to this hepatic flexure lesion however it certainly did not have evidence of recent bleeding and did not bleed on contact today despite manipulation.  Discharge:  The patient was given an after visit summary detailing the procedure, findings, recommendations and follow up plans.  Gabriel Saldana MD  7/31/2024  3:03 PM

## 2024-07-31 NOTE — PLAN OF CARE
Problem: Diabetes/Glucose Control  Goal: Glucose maintained within prescribed range  Description: INTERVENTIONS:  - Monitor Blood Glucose as ordered  - Assess for signs and symptoms of hyperglycemia and hypoglycemia  - Administer ordered medications to maintain glucose within target range  - Assess barriers to adequate nutritional intake and initiate nutrition consult as needed  - Instruct patient on self management of diabetes  Outcome: Progressing   A&Ox4. IVF infusing. Multiple BMs overnight, DAVID each one due to pt flushing.

## 2024-08-01 ENCOUNTER — APPOINTMENT (OUTPATIENT)
Dept: GENERAL RADIOLOGY | Facility: HOSPITAL | Age: 79
End: 2024-08-01
Attending: HOSPITALIST
Payer: MEDICARE

## 2024-08-01 VITALS
WEIGHT: 146.5 LBS | DIASTOLIC BLOOD PRESSURE: 54 MMHG | HEIGHT: 64 IN | RESPIRATION RATE: 20 BRPM | SYSTOLIC BLOOD PRESSURE: 151 MMHG | OXYGEN SATURATION: 97 % | TEMPERATURE: 98 F | HEART RATE: 77 BPM | BODY MASS INDEX: 25.01 KG/M2

## 2024-08-01 LAB
ANION GAP SERPL CALC-SCNC: 7 MMOL/L (ref 0–18)
BASOPHILS # BLD AUTO: 0.07 X10(3) UL (ref 0–0.2)
BASOPHILS NFR BLD AUTO: 0.5 %
BILIRUB UR QL: NEGATIVE
BUN BLD-MCNC: 32 MG/DL (ref 9–23)
BUN/CREAT SERPL: 7.8 (ref 10–20)
CALCIUM BLD-MCNC: 9.6 MG/DL (ref 8.7–10.4)
CHLORIDE SERPL-SCNC: 104 MMOL/L (ref 98–112)
CLARITY UR: CLEAR
CO2 SERPL-SCNC: 27 MMOL/L (ref 21–32)
CREAT BLD-MCNC: 4.1 MG/DL
DEPRECATED RDW RBC AUTO: 57.7 FL (ref 35.1–46.3)
EGFRCR SERPLBLD CKD-EPI 2021: 14 ML/MIN/1.73M2 (ref 60–?)
EOSINOPHIL # BLD AUTO: 0.03 X10(3) UL (ref 0–0.7)
EOSINOPHIL NFR BLD AUTO: 0.2 %
ERYTHROCYTE [DISTWIDTH] IN BLOOD BY AUTOMATED COUNT: 17 % (ref 11–15)
GLUCOSE BLD-MCNC: 200 MG/DL (ref 70–99)
GLUCOSE BLDC GLUCOMTR-MCNC: 126 MG/DL (ref 70–99)
GLUCOSE BLDC GLUCOMTR-MCNC: 140 MG/DL (ref 70–99)
GLUCOSE BLDC GLUCOMTR-MCNC: 150 MG/DL (ref 70–99)
GLUCOSE UR-MCNC: 500 MG/DL
HCT VFR BLD AUTO: 31.8 %
HGB BLD-MCNC: 10.5 G/DL
HYALINE CASTS #/AREA URNS AUTO: PRESENT /LPF
IMM GRANULOCYTES # BLD AUTO: 0.06 X10(3) UL (ref 0–1)
IMM GRANULOCYTES NFR BLD: 0.4 %
KETONES UR-MCNC: NEGATIVE MG/DL
LEUKOCYTE ESTERASE UR QL STRIP.AUTO: NEGATIVE
LYMPHOCYTES # BLD AUTO: 0.6 X10(3) UL (ref 1–4)
LYMPHOCYTES NFR BLD AUTO: 4.2 %
MAGNESIUM SERPL-MCNC: 1.7 MG/DL (ref 1.6–2.6)
MCH RBC QN AUTO: 30.7 PG (ref 26–34)
MCHC RBC AUTO-ENTMCNC: 33 G/DL (ref 31–37)
MCV RBC AUTO: 93 FL
MONOCYTES # BLD AUTO: 0.66 X10(3) UL (ref 0.1–1)
MONOCYTES NFR BLD AUTO: 4.7 %
NEUTROPHILS # BLD AUTO: 12.75 X10 (3) UL (ref 1.5–7.7)
NEUTROPHILS # BLD AUTO: 12.75 X10(3) UL (ref 1.5–7.7)
NEUTROPHILS NFR BLD AUTO: 90 %
NITRITE UR QL STRIP.AUTO: NEGATIVE
OSMOLALITY SERPL CALC.SUM OF ELEC: 299 MOSM/KG (ref 275–295)
PH UR: 7 [PH] (ref 5–8)
PHOSPHATE SERPL-MCNC: 5 MG/DL (ref 2.4–5.1)
PLATELET # BLD AUTO: 153 10(3)UL (ref 150–450)
POTASSIUM SERPL-SCNC: 4.2 MMOL/L (ref 3.5–5.1)
PROCALCITONIN SERPL-MCNC: 0.56 NG/ML (ref ?–0.05)
PROT UR-MCNC: 600 MG/DL
RBC # BLD AUTO: 3.42 X10(6)UL
SODIUM SERPL-SCNC: 138 MMOL/L (ref 136–145)
SP GR UR STRIP: 1.02 (ref 1–1.03)
UROBILINOGEN UR STRIP-ACNC: NORMAL
WBC # BLD AUTO: 14.2 X10(3) UL (ref 4–11)

## 2024-08-01 PROCEDURE — 99239 HOSP IP/OBS DSCHRG MGMT >30: CPT | Performed by: HOSPITALIST

## 2024-08-01 PROCEDURE — 71045 X-RAY EXAM CHEST 1 VIEW: CPT | Performed by: HOSPITALIST

## 2024-08-01 RX ORDER — ASPIRIN 81 MG/1
81 TABLET ORAL ONCE
Status: COMPLETED | OUTPATIENT
Start: 2024-08-01 | End: 2024-08-01

## 2024-08-01 NOTE — PROGRESS NOTES
Cardiology Progress Note   [facility]    Ollie Hernández Patient Status:  Inpatient    1945 MRN [id number]   Location [facility] 3W/SW Attending Abdifatah Mesa MD   Hosp Day # 2 PCP Wili Parmar MD     Reason for Consultation:  GIB, AF, CAD s/p PCI 2024    History of Present Illness:  Ollie Hernández is a a(n) 79 year old male with pmh AF on eliquis, CAD s/p PCI Lcx 2024, HFpEF, ESRD on HD, HTN, HL presenting with GIB.  Of note, patient with two admissions in 2024 following PCI 2024 with GI bleeding.  S/p EGD and c-scope with intervention to bleeding lesion in hepatic flexure vs transverse colon on each occasion.  States yesterday evening noticed dark/bright red stool prompting ER admission.  Denies chest pain, palpitations, orthopnea, PND or LE edema.  Denies syncope or near syncope. +Chronic, stable dyspnea on exertion.  Recently transitioned from plavix/eliquis to asa/eliquis.      On admission patient afeb, , /66.  Hgb 11.4.  ECG with ST with PVCs.  Cardiology consulted for further eval and management.      24  No bleeding he is aware of. EGD yesterday  no cause.   Denies CP SOB     History:  Past Medical History:    Anemia    Asthma (HCC)    Back problem    BPH (benign prostatic hyperplasia)    Calculus of kidney    Cataract    Coronary atherosclerosis    Diabetes (HCC)    ESRD (end stage renal disease) on dialysis (HCC)    Essential hypertension    High blood pressure    High cholesterol    History of blood transfusion    Hyperlipidemia    Neuropathy    hands and feet    KRAIG on CPAP    Renal disorder    Sleep apnea    Visual impairment    glasses    Vocal cord paralysis, unilateral partial     Past Surgical History:   Procedure Laterality Date    Appendectomy          Back surgery      Neck/back -     Capsule endoscopy - internal referral      Cataract      2021 and 2022    Cath bare metal stent (bms)      Colonoscopy      Colonoscopy N/A  2021    Procedure: COLONOSCOPY;  Surgeon: Michael Gautam MD;  Location: [facility] ENDO    Colonoscopy N/A 2024    Procedure: COLONOSCOPY;  Surgeon: Gabriel Saldana MD;  Location: [facility] ENDOSCOPY    Colonoscopy N/A 06/15/2024    Procedure: COLONOSCOPY;  Surgeon: Carlyn Storey MD;  Location: [facility] ENDOSCOPY    Colonoscopy N/A 2024    Procedure: COLONOSCOPY;  Surgeon: Gabriel Saldana MD;  Location: [facility] ENDOSCOPY    Hand/finger surgery unlisted      Accidental trauma    Spine surgery procedure unlisted      Upper gi endoscopy,diagnosis       Family History   Problem Relation Age of Onset    Cancer Father         Kidney    Breast Cancer Mother     Diabetes Mother     Diabetes Maternal Grandmother     Diabetes Maternal Grandfather     Heart Disorder Other         Uncle      reports that he quit smoking about 43 years ago. His smoking use included cigarettes. He started smoking about 60 years ago. He has a 17 pack-year smoking history. He has never used smokeless tobacco. He reports that he does not drink alcohol and does not use drugs.    Allergies:  Allergies   Allergen Reactions    Adhesive Tape OTHER (SEE COMMENTS)     Severe rashes    Dust Mite Extract RASH       Medications:  Current Facility-Administered Medications on File Prior to Encounter   Medication Dose Route Frequency Provider Last Rate Last Admin    [COMPLETED] sodium chloride 0.9% infusion   Intravenous Once Ebonie Simmons DO 10 mL/hr at 24 0645 New Bag at 24 0645    [COMPLETED] iron sucrose (Venofer) 20 mg/mL injection 200 mg  200 mg Intravenous Once Carlyn Storey MD   200 mg at 24 1605    [COMPLETED] sodium chloride 0.9% infusion   Intravenous Once Wili Parmar MD 25 mL/hr at 06/15/24 0950 New Bag at 06/15/24 0950    [] sodium chloride 0.9 % IV bolus 100 mL  100 mL Intravenous Q30 Min PRN Elke Martinez MD        And    [] albumin human (Albumin) 25% injection 25 g  25 g Intravenous PRN Dialysis Elke Martinez  MD        [COMPLETED] polyethylene glycol-electrolyte (Golytely) 236 g oral solution 4,000 mL  4,000 mL Oral Once Carlyn Storey MD   4,000 mL at 24 1757    [COMPLETED] desmopressin (DDAVP) 21.8 mcg in sodium chloride 0.9% 50 mL IVPB  0.3 mcg/kg Intravenous Once Antonio Nunes MD   Stopped at 24 1129    [] albumin human (Albumin) 25% injection 25 g  25 g Intravenous PRN Dialysis Carlyn Storey MD        [COMPLETED] iron sucrose (Venofer) 300 mg in sodium chloride 0.9% 250 mL IVPB  300 mg Intravenous Daily José Callahan .7 mL/hr at 24 1133 300 mg at 24 1133    [COMPLETED] polyethylene glycol-electrolyte (Golytely) 236 g oral solution 2,000 mL  2,000 mL Oral Once Emilio Talley MD   2,000 mL at 24 0456    [COMPLETED] polyethylene glycol-electrolyte (Golytely) 236 g oral solution 2,000 mL  2,000 mL Oral Once Emilio Talley MD   2,000 mL at 24 1800    [] sodium chloride 0.9 % IV bolus 100 mL  100 mL Intravenous Q30 Min PRN Walker Klein MD        And    [] albumin human (Albumin) 25% injection 25 g  25 g Intravenous PRN Dialysis Walker Klein MD        [COMPLETED] acetaminophen (Tylenol Extra Strength) tab 1,000 mg  1,000 mg Oral Once Devaughn Montiel, DO   1,000 mg at 24 0345    [COMPLETED] sodium chloride 0.9% infusion   Intravenous On Call Luis Orozco MD   Stopped at 24 0000    [COMPLETED] lidocaine PF (Xylocaine-MPF) 2 % injection             [COMPLETED] heparin in sodium chloride 0.9% (Porcine) 2 Units/mL flush bag premix             [COMPLETED] heparin in sodium chloride 0.9% (Porcine) 2 Units/mL flush bag premix             [COMPLETED] fentaNYL (Sublimaze) 50 mcg/mL injection             [COMPLETED] midazolam (Versed) 2 MG/2ML injection             [COMPLETED] heparin (Porcine) 1000 UNIT/ML injection             [COMPLETED] Nitroglycerin in D5W 200-5 MCG/ML-% injection             [COMPLETED] midazolam (Versed) 2 MG/2ML  injection             [COMPLETED] iohexol (Omnipaque) 300 MG/ML injection 350 mL  350 mL Intravenous ONCE PRN Luis Orozco MD   230 mL at 24 1601    [] sodium chloride 0.9% infusion   Intravenous Continuous Luis Orozco MD   Stopped at 24 0000     Current Outpatient Medications on File Prior to Encounter   Medication Sig Dispense Refill    aspirin 81 MG Oral Tab EC Take 1 tablet (81 mg total) by mouth daily. 90 tablet 3    atorvastatin 40 MG Oral Tab Take 1 tablet (40 mg total) by mouth nightly. 90 tablet 3    apixaban 5 MG Oral Tab Take 1 tablet (5 mg total) by mouth 2 (two) times daily.      Budesonide-Formoterol Fumarate (SYMBICORT) 160-4.5 MCG/ACT Inhalation Aerosol Inhale 2 puffs into the lungs 2 (two) times daily. 3 each 3    PANTOPRAZOLE 40 MG Oral Tab EC TAKE 1 TABLET BY MOUTH EVERY  MORNING BEFORE BREAKFAST 90 tablet 3    linaGLIPtin (TRADJENTA) 5 mg Oral Tab Take 1 tablet (5 mg total) by mouth daily. 90 tablet 1    escitalopram 5 MG Oral Tab Take 1 tablet (5 mg total) by mouth daily. 90 tablet 3    carvedilol 25 MG Oral Tab Take 1 tablet (25 mg total) by mouth 2 (two) times daily.      acetaminophen 500 MG Oral Tab Take 1 tablet (500 mg total) by mouth daily as needed for Pain.      cetirizine 10 MG Oral Tab Take 1 tablet (10 mg total) by mouth every other day.      Cholecalciferol 125 MCG (5000 UT) Oral Tab Take 1 tablet (5,000 Units total) by mouth 2 (two) times daily.      polyethylene glycol, PEG 3350, 17 g Oral Powd Pack 17 g Wed, Thurs, Sat      tetrahydrozoline 0.05 % Ophthalmic Solution 1 drop 2 (two) times daily as needed.      traMADol 50 MG Oral Tab Take 1 tablet (50 mg total) by mouth every 12 (twelve) hours as needed for Pain.      Glucose Blood (CONTOUR NEXT TEST) In Vitro Strip Test 3 times daily 300 each 1    felodipine ER 10 MG Oral Tablet 24 Hr Take 1 tablet (10 mg total) by mouth daily. 90 tablet 3    fluticasone propionate 50 MCG/ACT Nasal Suspension 2 sprays by  Nasal route daily. 48 g 3    methocarbamol 500 MG Oral Tab Take 1 tablet (500 mg total) by mouth 2 (two) times daily as needed. 60 tablet 1    albuterol (PROAIR HFA) 108 (90 Base) MCG/ACT Inhalation Aero Soln Inhale 2 puffs into the lungs every 4 (four) hours as needed for Wheezing. 3 each 3    Darbepoetin Randell 60 MCG/ML Injection Solution Inject into the vein as needed.           Physical Exam:  Blood pressure 155/58, pulse 76, temperature 97.7 °F (36.5 °C), temperature source Oral, resp. rate 22, height 162.6 cm (5' 4\"), weight 146 lb 8 oz (66.5 kg), SpO2 99%.  Wt Readings from Last 3 Encounters:   08/01/24 146 lb 8 oz (66.5 kg)   06/27/24 163 lb (73.9 kg)   06/13/24 156 lb 8 oz (71 kg)       General: awake, alert, oriented x 3, no acute distress  HEENT: at/nc, perrl, eomi  Neck: No JVD, + no bruits.  Cardiac: Regular rate and rhythm, S1, S2 normal, no murmur, rub or gallop.  Lungs: Clear without wheezes, rales, rhonchi or dullness.  Normal excursions and effort.  Abdomen: Soft, non-tender, non-distended, normal bowel sounds   Extremities: Without clubbing, cyanosis or edema.  Peripheral pulses are 2+. R arm fistula   Neurologic: Alert and oriented, normal affect.  Psych: normal mood and affect  Skin: Warm and dry.       Laboratories and Data:  Diagnostics:  EKG: ST, PVCs    Tele: NSR no AF     Labs:   CBC:    Lab Results   Component Value Date    WBC 14.2 (H) 08/01/2024    WBC 7.0 07/31/2024    WBC 5.5 07/30/2024     Lab Results   Component Value Date    HGB 10.5 (L) 08/01/2024    HGB 10.1 (L) 07/31/2024    HGB 8.6 (L) 07/31/2024      Lab Results   Component Value Date    .0 08/01/2024    .0 (L) 07/31/2024    .0 07/30/2024     BMP:   No results found for: \"GLUCOSE\"  Lab Results   Component Value Date    K 4.2 08/01/2024    K 4.7 07/31/2024    K 4.5 07/30/2024     Lab Results   Component Value Date    BUN 32 (H) 08/01/2024    BUN 50 (H) 07/31/2024    BUN 50 (H) 07/30/2024     Lab Results    Component Value Date    CREATSERUM 4.10 (H) 08/01/2024    CREATSERUM 5.72 (H) 07/31/2024    CREATSERUM 5.01 (H) 07/30/2024     Cholesterol:     Lab Results   Component Value Date    CHOLEST 146 07/31/2024    CHOLEST 153 07/04/2024    CHOLEST 170 06/23/2023     Lab Results   Component Value Date    HDL 29 (L) 07/31/2024    HDL 41 07/04/2024    HDL 43 06/23/2023     Lab Results   Component Value Date    TRIG 130 07/31/2024    TRIG 60 07/04/2024    TRIG 132 06/23/2023     Lab Results   Component Value Date    LDL 93 07/31/2024     (H) 07/04/2024     (H) 06/23/2023     Lab Results   Component Value Date    AST 29 07/30/2024    AST 24 07/04/2024    AST 16 06/16/2024     Lab Results   Component Value Date    ALT 33 07/30/2024    ALT 24 07/04/2024    ALT 12 06/16/2024       Assessment/Plan:  79 year old male presenting with:    1) GI bleeding (two similar admissions 6/2024 with intervention to bleeding lesion hepatic flexure vs transverse colon on each admission)  2) CAD s/p PCI Lcx 5/2024  3) pAF on eliquis  4) HFpEF  5) ESRD on HD MWF   R arm fistula   6) HTN  7) HL    - Cont statin, bb, hydralazine  - Asa and eliquis on hold; resume asa as soon as safely possible from   - Consider outpatient eval for Watchman given recurrent GI bleeding and now long term indication for antiplatelet/anticoagulant therapy missed earlier Apts.   -  Reviewed w/ Pt  wife at bedside and daughter on phone  reviewed  risk of thrombosis / CVA  off ASA/ Eliquis  Bleed on.  Retart ASA today  eliquis tomorrow if no bleeding  questions answered .   - Monitor on tele  - Reviewed w/ RN

## 2024-08-01 NOTE — PHYSICAL THERAPY NOTE
PHYSICAL THERAPY EVALUATION - INPATIENT     Room Number: COF14/COF14-A  Evaluation Date: 8/1/2024  Type of Evaluation: Initial   Physician Order: PT Eval and Treat    Presenting Problem: GI bleed, ESRD on HD  Co-Morbidities : ESRD on HD, CAD, anemia, asthma, DM II, HTN, HLD, neuropathy, OS  Reason for Therapy: Mobility Dysfunction and Discharge Planning    PHYSICAL THERAPY ASSESSMENT   Patient is a 79 year old male admitted 7/30/2024 for GI bleed.  Prior to admission, patient's baseline is Sara using straight cane for short distances, rolling walker for longer distances.  Patient is currently functioning near baseline with transfers and gait.  Patient is requiring stand-by assist as a result of the following impairments: decreased endurance/aerobic capacity, pain, and medical status.  Physical Therapy will continue to follow for duration of hospitalization.    Patient will benefit from continued skilled PT Services at discharge to promote prior level of function and safety with additional support and return home with home health PT.    PLAN  PT Treatment Plan: Bed mobility;Body mechanics;Endurance;Energy conservation;Patient education;Gait training;Strengthening;Stair training;Transfer training  Rehab Potential : Good  Frequency (Obs): 3-5x/week    PHYSICAL THERAPY MEDICAL/SOCIAL HISTORY   History related to current admission: Recent admissions for GI bleed, was Sara using a walker, D/C'ed home with no therapy needs     Problem List  Principal Problem:    Lower GI bleeding  Active Problems:    ESRD on hemodialysis (HCC)      HOME SITUATION  Home Situation  Type of Home: House  Home Layout: One level  Stairs to Enter : 5  Railing: Yes  Lives With: Spouse  Drives: Yes  Patient Owned Equipment: Rolling walker;Cane  Patient Regularly Uses: None     Prior Level of [place]: Sara with straight cane for short distances, rolling walker for longer distances, wife assists with transportation    SUBJECTIVE  \"I want to make  sure I make all my appointments.\"    PHYSICAL THERAPY EXAMINATION   OBJECTIVE  Precautions: Limb alert - right  Fall Risk: Standard fall risk    WEIGHT BEARING RESTRICTION  Weight Bearing Restriction: None                PAIN ASSESSMENT  Rating: Unable to rate  Location: generalized, back, neck  Management Techniques: Body mechanics;Activity promotion    COGNITION  Overall Cognitive Status:  WFL - within functional limits  Perseveration: perseverates during conversation    RANGE OF MOTION AND STRENGTH ASSESSMENT  Upper extremity ROM and strength are within functional limits  BUEs  Lower extremity ROM is within functional limits  BLEs  Lower extremity strength is within functional limits  BLEs    BALANCE  Static Sitting: Normal  Dynamic Sitting: Good  Static Standing: Good  Dynamic Standing: Fair +      ACTIVITY TOLERANCE  Pulse: 82                      O2 WALK  Oxygen Therapy  SPO2% on Room Air at Rest: 99  SPO2% Ambulation on Room Air: 98    AM-PAC '6-Clicks' INPATIENT SHORT FORM - BASIC MOBILITY  How much difficulty does the patient currently have...  Patient Difficulty: Turning over in bed (including adjusting bedclothes, sheets and blankets)?: A Little   Patient Difficulty: Sitting down on and standing up from a chair with arms (e.g., wheelchair, bedside commode, etc.): A Little   Patient Difficulty: Moving from lying on back to sitting on the side of the bed?: A Little   How much help from another person does the patient currently need...   Help from Another: Moving to and from a bed to a chair (including a wheelchair)?: A Little   Help from Another: Need to walk in hospital room?: A Little   Help from Another: Climbing 3-5 steps with a railing?: A Little     AM-PAC Score:  Raw Score: 18   Approx Degree of Impairment: 46.58%   Standardized Score (AM-PAC Scale): 43.63   CMS Modifier (G-Code): CK    FUNCTIONAL ABILITY STATUS  Functional Mobility/Gait Assessment  Gait Assistance: Other (Comment) (SBA)  Distance  (ft): 75  Assistive Device: Rolling walker  Pattern: Within Functional Limits (slow, step-through pattern, x2 brief static standing rest breaks, increased work of breathing with gait and talking, SpO2 >97% throughout)  Sit to Stand: stand-by assist    Exercise/Education Provided:  Bed mobility  Body mechanics  Energy conservation  Functional activity tolerated  Gait training  Transfer training    Skilled Therapy Provided: Pt tolerating household distances of gait using rolling walker. Pt's wife present and supportive. Pt reporting fatigue but feels confidence in ability to perform household mobility.    Patient received seated edge of bed, agreeable to physical therapy evaluation. Vital signs monitored as noted above, patient experiencing dyspnea on exertion, SpO2 stable with activity and at rest . Education with patient and patient's wife provided verbally on physical therapy plan of care and physiological benefits of out of bed mobility. Next session anticipate to progress gait and stair negotiation.    Patient seen for evaluation in coordination with occupational therapy to maximize patient participation and function. Patient history and/or personal factors that may impact the plan of care include co-morbidities (ESRD on HD, CAD, anemia, asthma, DM II, HTN, HLD, neuropathy, KRAIG) affecting medical status, endurance, pain levels, longstanding history of pain, and has history of recent hospitalization. Based on the physical therapy examination of the noted systems and functional activity/participation limitations, the patient presentation is evolving given the patient demonstrates worsening of previously stable condition and demonstrates worsening of co-morbidities.      The patient's Approx Degree of Impairment: 46.58% has been calculated based on documentation in the [facility] '6 clicks' Inpatient Basic Mobility Short Form.  Research supports that patients with this level of impairment may benefit from home-health  PT.  Final disposition will be made by interdisciplinary medical team.    Patient End of Session: In bed;Needs met;Call light within reach;RN aware of session/findings;All patient questions and concerns addressed;Family present    CURRENT GOALS  Goals to be met by: 24  Patient Goal Patient's self-stated goal is: return home safely   Goal #1 Patient is able to demonstrate supine - sit EOB @ level: independent     Goal #1   Current Status    Goal #2 Patient is able to demonstrate transfers EOB to/from [facility] at assistance level: modified independent with walker - rolling     Goal #2  Current Status    Goal #3 Patient is able to ambulate 150 feet with assist device: walker - rolling at assistance level: modified independent   Goal #3   Current Status    Goal #4 Patient will negotiate 5 stairs/one curb w/ assistive device and supervision   Goal #4   Current Status    Goal #5 Patient to demonstrate independence with home activity/exercise instructions provided to patient in preparation for discharge.   Goal #5   Current Status    Goal #6    Goal #6  Current Status      Patient Evaluation Complexity Level:  History High - 3 or more personal factors and/or co-morbidities   Examination of body systems Moderate - addressing a total of 3 or more elements   Clinical Presentation  Moderate - Evolving   Clinical Decision Making  Moderate Complexity     Gait Trainin minutes      Daria Rivas, PT, DPT  [facility]  Rehab Services - Physical Therapy  [id number]

## 2024-08-01 NOTE — DISCHARGE SUMMARY
[facility]  part of [facility]     Discharge Summary    Ollie Hernández Patient Status:  Inpatient    1945 MRN [id number]   Location [facility] 3W/SW Attending Darlin Phipps MD   Hosp Day # 2 PCP Wili Parmar MD     Date of Admission: 2024    Date of Discharge: 2024    Admitting Diagnosis: Lower GI bleeding [K92.2]  ESRD on hemodialysis (HCC) [N18.6, Z99.2]    Discharge Diagnosis:   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)    Atherosclerosis of native arteries of extremities with intermittent claudication, bilateral legs (HCC)    Primary hypertension    Smokers' cough (HCC)    Unstable angina (HCC)    Lower GI bleed    ESRD (end stage renal disease) on dialysis (HCC)    Atrial fibrillation (HCC)    AVM (arteriovenous malformation) of colon    Anemia, blood loss    Rectal bleeding    Anticoagulated    Antiplatelet or antithrombotic long-term use    Lower GI bleeding    ESRD on hemodialysis (HCC)       Reason for Admission:     Lower Gi bleeding    Physical Exam:     General: Patient is alert and oriented x3 does not appear to be in acute distress at this time  HEENT: EOMI PERRLA, atraumatic normocephalic  Cardiac: S1-S2 appreciated  Lungs: Good air entry bilaterally clear to auscultation  Abdomen: Soft nontender nondistended positive bowel sounds  Ext: Peripheral pulses are positive  Neuro: No focal deficits noted  Psych: Normal mood  Skin: No  rashes noted  MSK: Full range of motion intact      Hospital Course:     ower GI bleeding  -Patient presenting with black tarry stools  -Patient on chronic anticoagulation with Eliquis and aspirin, currently holding.  -Hemoglobin noted to be 11.4  -Starting IV Protonix twice daily  -Light IV fluids  -Keep n.p.o.  -GI consulted, appreciate further recommendations     History of CAD  -Status post PCI in May 2024  -Continue statin, beta-blocker  -Holding aspirin  -Cardiology on consult, appreciate further recommendations     Paroxysmal atrial fibrillation  -Currently in sinus rhythm  -Rate at goal  -On chronic anticoagulation with Eliquis, holding  -Cardiology on consult, appreciate further recommendations     Chronic heart failure with preserved ejection fraction   -Without current signs of acute exacerbation  - Strict I&Os, Daily weights, Fluid restriction  - Cardiology on consult, appreciate recs.      ESRD on HD  -Nephrology on consult, appreciate recommendations for further HD     HTN  - controlled  - CPM  - Monitor and adjust as needed     Hyperlipidemia  -Continue statin     VTE ppx: SCD     Global A/P  -Reviewed previous consultant notes  -Reviewed CBC, BMP, Mag, and Phos  -Reviewed tests ordered  -Repeat labs in am  -MDM: High, severe exacerbation of chronic illness posing a threat to life. IV medications requiring close inpatient monitoring.   -s/p scope today with cauterization.  -hgb stable  -appreciate recs, will continue to monitor.    History of Present Illness:     Per admitting:   This is a 79 year oldmale who presented complaining of dark black stools.  Patient stated the night prior to admission he began developing dark tarry stools.  Patient reports around 4 episodes of dark tarry stools since that time.  Of note, patient states he has had several previous admissions for similar issues.  At that time he underwent colonoscopy with clipping of active bleeding lesion.  Patient is on chronic  anticoagulation with Eliquis for atrial fibrillation and is also on aspirin due to previous cardiac stent.  At time of interview, patient denies current abdominal pain, nausea or vomiting.  Patient did note still having dark tarry stools.  Denies current chest pain, shortness of breath, fevers or chills.     Disposition: Home or Self Care    Discharge Condition: Stable    Discharge Medications:   Current Discharge Medication List        CONTINUE these medications which have NOT CHANGED    Details   aspirin 81 MG Oral Tab EC Take 1 tablet (81 mg total) by mouth daily.  Qty: 90 tablet, Refills: 3      atorvastatin 40 MG Oral Tab Take 1 tablet (40 mg total) by mouth nightly.  Qty: 90 tablet, Refills: 3    Associated Diagnoses: Mixed hyperlipidemia      apixaban 5 MG Oral Tab Take 1 tablet (5 mg total) by mouth 2 (two) times daily.      Budesonide-Formoterol Fumarate (SYMBICORT) 160-4.5 MCG/ACT Inhalation Aerosol Inhale 2 puffs into the lungs 2 (two) times daily.  Qty: 3 each, Refills: 3      PANTOPRAZOLE 40 MG Oral Tab EC TAKE 1 TABLET BY MOUTH EVERY  MORNING BEFORE BREAKFAST  Qty: 90 tablet, Refills: 3      linaGLIPtin (TRADJENTA) 5 mg Oral Tab Take 1 tablet (5 mg total) by mouth daily.  Qty: 90 tablet, Refills: 1      escitalopram 5 MG Oral Tab Take 1 tablet (5 mg total) by mouth daily.  Qty: 90 tablet, Refills: 3      carvedilol 25 MG Oral Tab Take 1 tablet (25 mg total) by mouth 2 (two) times daily.      acetaminophen 500 MG Oral Tab Take 1 tablet (500 mg total) by mouth daily as needed for Pain.      cetirizine 10 MG Oral Tab Take 1 tablet (10 mg total) by mouth every other day.      Cholecalciferol 125 MCG (5000 UT) Oral Tab Take 1 tablet (5,000 Units total) by mouth 2 (two) times daily.      polyethylene glycol, PEG 3350, 17 g Oral Powd Pack 17 g Wed, Thurs, Sat      tetrahydrozoline 0.05 % Ophthalmic Solution 1 drop 2 (two) times daily as needed.      traMADol 50 MG Oral Tab Take 1 tablet (50 mg total) by mouth  every 12 (twelve) hours as needed for Pain.      Glucose Blood (CONTOUR NEXT TEST) In Vitro Strip Test 3 times daily  Qty: 300 each, Refills: 1    Associated Diagnoses: Diabetic nephropathy associated with type 2 diabetes mellitus (HCC)      felodipine ER 10 MG Oral Tablet 24 Hr Take 1 tablet (10 mg total) by mouth daily.  Qty: 90 tablet, Refills: 3      fluticasone propionate 50 MCG/ACT Nasal Suspension 2 sprays by Nasal route daily.  Qty: 48 g, Refills: 3      methocarbamol 500 MG Oral Tab Take 1 tablet (500 mg total) by mouth 2 (two) times daily as needed.  Qty: 60 tablet, Refills: 1      albuterol (PROAIR HFA) 108 (90 Base) MCG/ACT Inhalation Aero Soln Inhale 2 puffs into the lungs every 4 (four) hours as needed for Wheezing.  Qty: 3 each, Refills: 3    Associated Diagnoses: Fever in other diseases; Cough; Respiratory crackles at right lung base      Darbepoetin Randell 60 MCG/ML Injection Solution Inject into the vein as needed.             Total dc time > 30 min  Darlin Phipps MD  8/1/2024  2:42 PM     Hospital Discharge Diagnoses:  lower GI bleed    Lace+ Score: 81  59-90 High Risk  29-58 Medium Risk  0-28   Low Risk.    TCM Follow-Up Recommendation:  LACE > 58: High Risk of readmission after discharge from the hospital.

## 2024-08-01 NOTE — PLAN OF CARE
Pt is A&Ox4, pt on RA, HD M/W/F, 1L off from Wed. Dialysis, plan is to resume anti-coags, ASA today and apixaban tomorrow, once medically clear, DC home with wife.   Problem: Patient Centered Care  Goal: Patient preferences are identified and integrated in the patient's plan of care  Description: Interventions:  - What would you like us to know as we care for you? I am from home with my wife  - Provide timely, complete, and accurate information to patient/family  - Incorporate patient and family knowledge, values, beliefs, and cultural backgrounds into the planning and delivery of care  - Encourage patient/family to participate in care and decision-making at the level they choose  - Honor patient and family perspectives and choices  Outcome: Progressing     Problem: Diabetes/Glucose Control  Goal: Glucose maintained within prescribed range  Description: INTERVENTIONS:  - Monitor Blood Glucose as ordered  - Assess for signs and symptoms of hyperglycemia and hypoglycemia  - Administer ordered medications to maintain glucose within target range  - Assess barriers to adequate nutritional intake and initiate nutrition consult as needed  - Instruct patient on self management of diabetes  Outcome: Progressing     Problem: METABOLIC/FLUID AND ELECTROLYTES - ADULT  Goal: Glucose maintained within prescribed range  Description: INTERVENTIONS:  - Monitor Blood Glucose as ordered  - Assess for signs and symptoms of hyperglycemia and hypoglycemia  - Administer ordered medications to maintain glucose within target range  - Assess barriers to adequate nutritional intake and initiate nutrition consult as needed  - Instruct patient on self management of diabetes  Outcome: Progressing     Problem: METABOLIC/FLUID AND ELECTROLYTES - ADULT  Goal: Electrolytes maintained within normal limits  Description: INTERVENTIONS:  - Monitor labs and rhythm and assess patient for signs and symptoms of electrolyte imbalances  - Administer  electrolyte replacement as ordered  - Monitor response to electrolyte replacements, including rhythm and repeat lab results as appropriate  - Fluid restriction as ordered  - Instruct patient on fluid and nutrition restrictions as appropriate  Outcome: Progressing     Problem: METABOLIC/FLUID AND ELECTROLYTES - ADULT  Goal: Hemodynamic stability and optimal renal function maintained  Description: INTERVENTIONS:  - Monitor labs and assess for signs and symptoms of volume excess or deficit  - Monitor intake, output and patient weight  - Monitor urine specific gravity, serum osmolarity and serum sodium as indicated or ordered  - Monitor response to interventions for patient's volume status, including labs, urine output, blood pressure (other measures as available)  - Encourage oral intake as appropriate  - Instruct patient on fluid and nutrition restrictions as appropriate  Outcome: Progressing

## 2024-08-01 NOTE — PAYOR COMM NOTE
--------------  CONTINUED STAY REVIEW  7/31  Payor: [employer]  Subscriber #:  [id number]  Authorization Number: [id number]    Admit date: 7/30/24  Admit time:  9:19 AM    REVIEW DOCUMENTATION:  7/30 Nephrology    Patient n.p.o. for endoscopic evaluation-currently working on bowel prep.  On IV fluid.  Denies any chest pain or shortness of breath or abdominal pain        Review of Systems:      Constitutional: negative for fatigue, fevers and weight loss  Eyes: negative for irritation, redness and visual disturbance  Ears, nose, mouth, throat, and face: negative for hearing loss and sore throat  Respiratory: negative for cough, hemoptysis and wheezing  Cardiovascular: negative for chest pain, exertional dyspnea, lower extremity edema and palpitations  Gastrointestinal: Positive GI bleed  Genitourinary:negative for dysuria, frequency and hematuria  Hematologic/lymphatic: negative for bleeding and easy bruising  Musculoskeletal:negative for back pain, bone pain and muscle weakness  Neurological: negative for gait problems, memory problems and seizures  Behavioral/Psych: negative for anxiety and depression     Objective:   Temp:  [97.6 °F (36.4 °C)-98.7 °F (37.1 °C)] 98.7 °F (37.1 °C)  Pulse:  [68-82] 82  Resp:  [16-23] 20  BP: (168-198)/(62-72) 178/66  SpO2:  [97 %-99 %] 99 %  SpO2: 99 %      Intake/Output Summary (Last 24 hours) at 7/31/2024 1222  Last data filed at 7/31/2024 0900      Gross per 24 hour   Intake 0 ml   Output 1 ml   Net -1 ml          Wt Readings from Last 3 Encounters:   07/31/24 145 lb 6.4 oz (66 kg)   06/27/24 163 lb (73.9 kg)   06/13/24 156 lb 8 oz (71 kg)         General appearance: alert, appears stated age and cooperative  Head: Normocephalic, atraumatic  Eyes: conjunctivae/corneas clear  Throat: lips, mucosa, and tongue normal; teeth and gums normal  Neck:  no JVD, supple  Extremities: extremities normal, no edema  Skin: No rashes or lesions  Neurologic: Grossly  normal  Psychiatric: calm      Patient is a 79 year old male with past medical history of coronary artery disease s/p PCI on 5/21, A-fib on anticoagulation, ESRD on hemodialysis Monday Wednesday Friday via AV fistula, hypertension who presented to the emergency room for rectal bleeding and maroon-colored stools     1.ESRD: On HD  HD through AV fistula.  HD at [place] outpatient Monday Wednesday Friday  HD today-after colonoscopy  Check serum phosphorus     2.GI bleed: recurrent .  GI consulted-input noted  S/p CTA of abdomen-no active GI bleed noted  N.p.o. for endoscopic evaluation today  EGD and colonoscopy on 6/3 and colonoscopy on 6/21  Anemia: Secondary to GI bleed.  Continue to monitor for VANCE need     3.coronary artery disease s/p PCI on 5/21/ P-Afib  Cardiology input noted. Off of plavix. On ASA and eliquis - currently on hold  High risk of stent thrombosis and stroke             7/31 EGD/Colonoscopy Operative Report     Pre-Operative Diagnosis: recurrent GI bleed      Post-Operative Diagnosis:   Normal esophagus and Z-line, 1cm sliding hiatal hernia  Minimal gastric erythema otherwise normal gastric examination  Normal duodenum  Previously placed clip in the hepatic flexure region.  Clip attached to erythematous colon mucosa.  This was nonbleeding.  This was cauterized using APC.  No bleeding was noted before, during or after the procedure.   A couple of ascending colon red spots were cauterized but not bleeding or did not blood with contact.  I do not think that these are of any clinical significance.  Normal terminal ileum  A few scattered diverticulosis, nonbleeding  Small internal hemorrhoids        Procedure Performed:   EGD   COLONOSCOPY   Informed Consent: Informed consent for both the procedure and sedation were obtained from the patient. The potentially life-threatening complications of sedation, bleeding,  perforation, transfusion or repeat endoscopy were reviewed along with the possible need for  hospitalization, surgical management, transfusion or repeat endoscopy should one of these complications arise. The patient understands and is agreeable to proceed.  Sedation Type: MAC-Patient received sedation with monitored anesthesia provided by an anesthesiologist    Moderate Sedation Time: None.  Deep sedation provided by anesthesia.     Cecum Withdrawal Time:  20 min     Date of previous colonoscopy: 2024     Procedure Description: The patient was placed in the left lateral decubitus position. A bite block was placed in the patient’s mouth. The endoscope was inserted through the mouth and advanced under direct visualization to the 3rd portion of the duodenum and was then withdrawn to examine the duodenal bulb and gastric antrum.  The endoscope was then retroflexed to examine the angulus, GE junction, cardia, body and fundus,  then withdrawn to examine the esophagus. The endoscope was then removed from the patient. The patient tolerated the procedure well with no immediate complications. The patient was then repositioned for colonoscopy.  After careful digital rectal examination, the Pediatric colonoscope was inserted into the rectum and advanced to the level of the cecum under direct visualization. The cecum was identified by landmarks, including the appendiceal orifice and ileoceccal valve. Careful examination of the entire colon was performed during withdrawal of the endoscope. The scope was withdrawn to the rectum and retroflexion was performed.  The patient tolerated the procedure well with no immediate complications. The patient was transferred to the recovery area in stable condition.   Quality of Preparation: Adequate  Aronchick Bowel Prep Scale:  Good - 2     Findings:                ESOPHAGUS:  Normal esophagus.  The Z-line and GE junction noted at 38cms from the incisors and was normal.  There was a 1cm sliding hiatal hernia noted.                STOMACH:  normal gastric examination except for minimal  erythema.  No blood or bleeding source identified throughout the entire gastric examination.  Retroflexed view was unremarkable.               DUODEN normal duodenal bulb and normal postbulbar duodenum.  Scope advanced to the third portion of duodenum with no blood or bleeding source identified.               COLON:                              1.  There was evidence of the previously placed clip in the hepatic flexure region.  Mucosa that it was attached to was erythematous, however no bleeding was noted.  This area was APC'd using standard right colon setting.  No bleeding was noted.                            2.  There were a couple of red spots in the ascending colon that were nonbleeding.  These were APC'd as well however no bleeding was noted.  These were not likely of any clinical significance.                            3.  Normal terminal ileum.  No blood identified.                            4.  The colon mucosa throughout was otherwise normal.  No blood was noted throughout the entire colon examination.  No other obvious bleeding source was identified.                            5.  Small internal hemorrhoids     Recommendations:    Fiber supplementation such as benefiber or citrucel daily.   Monitor hgb  Would consider Watchman device if possible given his recurrent bleeding issues  Cause of this recent bleeding is unclear.  Possibly related to this hepatic flexure lesion however it certainly did not have evidence of recent bleeding and did not bleed on contact today despite manipulation.            7/31 IM    Chief Complaint:      Lower GI bleeding        Subjective:  Subjective:     Patient seen and examined  Underwent Scope today  Afebrile, normotensive.           Objective:  Blood pressure 143/74, pulse 71, temperature 98.7 °F (37.1 °C), temperature source Oral, resp. rate 21, height 5' 4\" (1.626 m), weight 145 lb 6.4 oz (66 kg), SpO2 98%.  Physical Exam     General: Patient is alert and oriented x3  does not appear to be in acute distress at this time  HEENT: EOMI PERRLA, atraumatic normocephalic  Cardiac: S1-S2 appreciated  Lungs: Good air entry bilaterally clear to auscultation  Abdomen: Soft nontender nondistended positive bowel sounds  Ext: Peripheral pulses are positive  Neuro: No focal deficits noted  Psych: Normal mood  Skin: No rashes noted  MSK: Full range of motion intact        Component Value Date     WBC 7.0 07/31/2024     HGB 10.1 (L) 07/31/2024     HCT 31.5 (L) 07/31/2024     .0 (L) 07/31/2024     CREATSERUM 5.72 (H) 07/31/2024     BUN 50 (H) 07/31/2024      07/31/2024     K 4.7 07/31/2024      07/31/2024     CO2 23.0 07/31/2024      (H) 07/31/2024     CA 9.7 07/31/2024     MG 1.7 07/31/2024     PHOS 6.5 (H) 07/31/2024     Lower GI bleeding  -Patient presenting with black tarry stools  -Patient on chronic anticoagulation with Eliquis and aspirin, currently holding.  -Hemoglobin noted to be 11.4  -Starting IV Protonix twice daily  -Light IV fluids  -Keep n.p.o.  -GI consulted, appreciate further recommendations     History of CAD  -Status post PCI in May 2024  -Continue statin, beta-blocker  -Holding aspirin  -Cardiology on consult, appreciate further recommendations     Paroxysmal atrial fibrillation  -Currently in sinus rhythm  -Rate at goal  -On chronic anticoagulation with Eliquis, holding  -Cardiology on consult, appreciate further recommendations     Chronic heart failure with preserved ejection fraction   -Without current signs of acute exacerbation  - Strict I&Os, Daily weights, Fluid restriction  - Cardiology on consult, appreciate recs.      ESRD on HD  -Nephrology on consult, appreciate recommendations for further HD     HTN  - controlled  - CPM  - Monitor and adjust as needed     Hyperlipidemia  -Continue statin     VTE ppx: SCD     Global A/P  -Reviewed previous consultant notes  -Reviewed CBC, BMP, Mag, and Phos  -Reviewed tests ordered  -Repeat labs in  am  -MDM: High, severe exacerbation of chronic illness posing a threat to life. IV medications requiring close inpatient monitoring.   -s/p scope today with cauterization.  -hgb stable  -appreciate recs, will continue to monitor.      Medications 07/30/24 07/31/24 08/01/24       labetalol (Trandate) 5 mg/mL injection 10 mg  Dose: 10 mg  Freq: Once Route: IV  Start: 07/31/24 0530 End: 07/31/24 0532   Admin Instructions:   Titrate to maintain systolic BP <180  Do not give if HR is less than 60.     0532 KG-Given                     pantoprazole (Protonix) 40 mg in sodium chloride 0.9% PF 10 mL IV push  Dose: 40 mg  Freq: Every 12 hours Route: IV  Start: 07/30/24 1000   Admin Instructions:   Dilute with 10 ml NS; IV push over 2 minutes    (1000 FL)-Not Given [C]   2107 KG-Given       1027 FL-Given   2303 KG-Given       0935 AH-Given   2200         pantoprazole (Protonix) 40 mg in sodium chloride 0.9% PF 10 mL IV push  Dose: 40 mg  Freq: Once Route: IV  Start: 07/30/24 0644 End: 07/30/24 0650   Admin Instructions:   Dilute with 10 ml NS; IV push over 2 minutes    0648 MM-Given                sodium chloride 0.9% infusion  Rate: 75 mL/hr  Freq: Continuous Route: IV  Last Dose: Stopped (07/31/24 1823)  Start: 07/30/24 1000 End: 07/31/24 1825    1022 FL-New Bag        1823 FL-Stopped   1825-D/C'd           hydrALAzine (Apresoline) 20 mg/mL injection 4 mg  Dose: 4 mg  Freq: Every 15 min PRN Route: IV  PRN Reason: Increased blood pressure  Start: 07/31/24 1553 End: 07/31/24 1614   Admin Instructions:   Give 4mg now, repeat in 15 minutes if BP remains elevated     1553 MM-Given   1614 MM-Given             ondansetron (Zofran) 4 MG/2ML injection 4 mg  Dose: 4 mg  Freq: Every 6 hours PRN Route: IV  PRN Reasons: Nausea,vomiting  Start: 07/30/24 0956 2015 EP-Given              Vitals (last day)       Date/Time Temp Pulse Resp BP SpO2 Weight O2 Device O2 Flow Rate (L/min) [facility]    08/01/24 0932 97.7 °F (36.5 °C) 76 22 155/58 99  % -- None (Room air) -- AH    08/01/24 0700 -- 82 -- -- -- -- -- -- RW    08/01/24 0603 -- 84 -- 143/53 -- -- -- -- KG    08/01/24 0530 -- -- -- -- -- 146 lb 8 oz (66.5 kg) -- -- RA    08/01/24 0341 98 °F (36.7 °C) 89 21 165/65 96 % -- None (Room air) -- KG    08/01/24 0300 -- 94 -- -- -- -- -- -- MO    08/01/24 0020 -- 89 -- -- 95 % -- -- -- SN    07/31/24 2254 98.2 °F (36.8 °C) 89 20 166/55 95 % -- None (Room air) -- KG    07/31/24 1900 -- 83 -- -- -- -- -- -- MO    07/31/24 1700 98 °F (36.7 °C) 87 18 178/63 100 % -- None (Room air) -- FL    07/31/24 1630 -- 78 12 185/65 99 % -- -- -- MD    07/31/24 1630 -- -- -- -- -- -- None (Room air) -- MM    07/31/24 1620 -- 78 18 202/68 100 % -- None (Room air) -- MM    07/31/24 1615 -- 71 19 198/67 99 % -- None (Room air) -- MM    07/31/24 1610 -- 73 21 203/66 99 % -- None (Room air) -- MM    07/31/24 1605 -- 70 19 195/70 99 % -- None (Room air) -- MM    07/31/24 1600 -- 74 18 214/72 98 % -- None (Room air) -- MM    07/31/24 1555 -- 69 18 202/67 98 % -- None (Room air) -- MM    07/31/24 1550 -- 72 20 211/70 97 % -- None (Room air) -- MM    07/31/24 1545 -- 68 18 206/70 98 % -- None (Room air) -- MM    07/31/24 1540 -- 69 16 203/70 98 % -- None (Room air) -- MM    07/31/24 1535 -- 70 14 205/71 98 % -- -- -- MM    07/31/24 1530 -- 72 17 187/112 98 % -- -- -- MM    07/31/24 1520 -- 71 21 143/74 98 % -- None (Room air) -- MM    07/31/24 1515 -- 72 21 147/119 97 % -- None (Room air) -- MM    07/31/24 1510 -- 75 18 154/106 94 % -- None (Room air) -- MM    07/31/24 1505 -- 72 21 140/113 95 % -- None (Room air) -- MM    07/31/24 1258 -- 69 16 153/51 99 % -- None (Room air) -- MM    07/31/24 0845 98.7 °F (37.1 °C) 82 20 178/66 99 % -- None (Room air) -- FL    07/31/24 0700 -- 69 -- -- -- -- -- -- RW    07/31/24 0608 -- 71 -- 198/72 -- -- -- -- KG    07/31/24 0526 -- 76 -- 183/62 -- -- -- -- KG    07/31/24 0524 -- 73 -- 181/68 -- -- -- -- KG    07/31/24 0521 -- 74 -- 182/69 -- -- --  -- KG    07/31/24 0513 97.6 °F (36.4 °C) 73 20 184/70 99 % -- None (Room air) -- KG    07/31/24 0410 -- 78 22 -- 97 % -- -- -- NG    07/31/24 0401 -- -- -- -- -- 145 lb 6.4 oz (66 kg) -- -- LV    07/31/24 0300 -- 81 -- -- -- -- -- -- GT

## 2024-08-01 NOTE — PLAN OF CARE
Per Dialysis RN, 1L removed. No complications with dialysis. Call light within reach.   Problem: METABOLIC/FLUID AND ELECTROLYTES - ADULT  Goal: Glucose maintained within prescribed range  Description: INTERVENTIONS:  - Monitor Blood Glucose as ordered  - Assess for signs and symptoms of hyperglycemia and hypoglycemia  - Administer ordered medications to maintain glucose within target range  - Assess barriers to adequate nutritional intake and initiate nutrition consult as needed  - Instruct patient on self management of diabetes  Outcome: Progressing  Goal: Electrolytes maintained within normal limits  Description: INTERVENTIONS:  - Monitor labs and rhythm and assess patient for signs and symptoms of electrolyte imbalances  - Administer electrolyte replacement as ordered  - Monitor response to electrolyte replacements, including rhythm and repeat lab results as appropriate  - Fluid restriction as ordered  - Instruct patient on fluid and nutrition restrictions as appropriate  Outcome: Progressing  Goal: Hemodynamic stability and optimal renal function maintained  Description: INTERVENTIONS:  - Monitor labs and assess for signs and symptoms of volume excess or deficit  - Monitor intake, output and patient weight  - Monitor urine specific gravity, serum osmolarity and serum sodium as indicated or ordered  - Monitor response to interventions for patient's volume status, including labs, urine output, blood pressure (other measures as available)  - Encourage oral intake as appropriate  - Instruct patient on fluid and nutrition restrictions as appropriate  Outcome: Progressing     Problem: Diabetes/Glucose Control  Goal: Glucose maintained within prescribed range  Description: INTERVENTIONS:  - Monitor Blood Glucose as ordered  - Assess for signs and symptoms of hyperglycemia and hypoglycemia  - Administer ordered medications to maintain glucose within target range  - Assess barriers to adequate nutritional intake and  initiate nutrition consult as needed  - Instruct patient on self management of diabetes  Outcome: Progressing

## 2024-08-01 NOTE — PROGRESS NOTES
[facility]  part of [facility]    Progress Note      Subjective:     Sitting comfortably.  No GI bleed.  S/p COVID yesterday.  Tolerated HD yesterday      Review of Systems:     Constitutional: negative for fatigue, fevers and weight loss  Eyes: negative for irritation, redness and visual disturbance  Ears, nose, mouth, throat, and face: negative for hearing loss and sore throat  Respiratory: negative for cough, hemoptysis and wheezing  Cardiovascular: negative for chest pain, exertional dyspnea, lower extremity edema and palpitations  Gastrointestinal: Positive GI bleed  Genitourinary:negative for dysuria, frequency and hematuria  Hematologic/lymphatic: negative for bleeding and easy bruising  Musculoskeletal:negative for back pain, bone pain and muscle weakness  Neurological: negative for gait problems, memory problems and seizures  Behavioral/Psych: negative for anxiety and depression    Objective:   Temp:  [97.7 °F (36.5 °C)-98.4 °F (36.9 °C)] 98.4 °F (36.9 °C)  Pulse:  [68-94] 74  Resp:  [12-22] 20  BP: (143-214)/() 151/54  SpO2:  [95 %-100 %] 97 %  SpO2: 97 %     Intake/Output Summary (Last 24 hours) at 8/1/2024 1513  Last data filed at 8/1/2024 1408  Gross per 24 hour   Intake 1090 ml   Output 1000 ml   Net 90 ml     Wt Readings from Last 3 Encounters:   08/01/24 146 lb 8 oz (66.5 kg)   06/27/24 163 lb (73.9 kg)   06/13/24 156 lb 8 oz (71 kg)       General appearance: alert, appears stated age and cooperative  Head: Normocephalic, atraumatic  Eyes: conjunctivae/corneas clear  Throat: lips, mucosa, and tongue normal; teeth and gums normal  Neck:  no JVD, supple  Extremities: extremities normal, no edema  Skin: No rashes or lesions  Neurologic: Grossly normal  Psychiatric: calm    Medications:  Current Facility-Administered Medications   Medication Dose Route Frequency    lactated ringers infusion   Intravenous Continuous    atorvastatin (Lipitor) tab 40 mg  40 mg Oral Nightly     fluticasone-salmeterol (Advair Diskus) 250-50 MCG/ACT inhaler 1 puff  1 puff Inhalation BID    escitalopram (Lexapro) tab 5 mg  5 mg Oral Daily    fluticasone propionate (Flonase) 50 MCG/ACT nasal suspension 2 spray  2 spray Nasal Daily    traMADol (Ultram) tab 50 mg  50 mg Oral Q12H PRN    glucose (Dex4) 15 GM/59ML oral liquid 15 g  15 g Oral Q15 Min PRN    Or    glucose (Glutose) 40% oral gel 15 g  15 g Oral Q15 Min PRN    Or    glucose-vitamin C (Dex-4) chewable tab 4 tablet  4 tablet Oral Q15 Min PRN    Or    dextrose 50% injection 50 mL  50 mL Intravenous Q15 Min PRN    Or    glucose (Dex4) 15 GM/59ML oral liquid 30 g  30 g Oral Q15 Min PRN    Or    glucose (Glutose) 40% oral gel 30 g  30 g Oral Q15 Min PRN    Or    glucose-vitamin C (Dex-4) chewable tab 8 tablet  8 tablet Oral Q15 Min PRN    ondansetron (Zofran) 4 MG/2ML injection 4 mg  4 mg Intravenous Q6H PRN    acetaminophen (Tylenol Extra Strength) tab 500 mg  500 mg Oral Q4H PRN    pantoprazole (Protonix) 40 mg in sodium chloride 0.9% PF 10 mL IV push  40 mg Intravenous Q12H    insulin aspart (NovoLOG) 100 Units/mL FlexPen 1-5 Units  1-5 Units Subcutaneous TID CC and HS    carvedilol (Coreg) tab 25 mg  25 mg Oral BID    hydrALAZINE (Apresoline) tab 25 mg  25 mg Oral Q8H STEPHANIE    polyethylene glycol-electrolyte (Golytely) 236 g oral solution 4,000 mL  4,000 mL Oral Once              Results:     Recent Labs   Lab 07/30/24  0441 07/30/24  1244 07/31/24  0019 07/31/24  0718 08/01/24  0558   RBC 3.78*  --   --  3.34* 3.42*   HGB 11.4*   < > 8.6* 10.1* 10.5*   HCT 35.7*   < > 27.2* 31.5* 31.8*   MCV 94.4  --   --  94.3 93.0   NEPRELIM 3.90  --   --  5.40 12.75*   WBC 5.5  --   --  7.0 14.2*   .0  --   --  149.0* 153.0    < > = values in this interval not displayed.     Recent Labs   Lab 07/30/24  0441 07/31/24  0718 08/01/24  0558   * 136* 200*   BUN 50* 50* 32*   CREATSERUM 5.01* 5.72* 4.10*   CA 9.3 9.7 9.6   ALB 3.5  --   --     139 138    K 4.5 4.7 4.2    105 104   CO2 30.0 23.0 27.0   ALKPHO 66  --   --    AST 29  --   --    ALT 33  --   --    BILT 0.3  --   --    TP 6.5  --   --      No results found for: \"PTT\", \"INR\"  No results for input(s): \"BNP\" in the last 168 hours.  Recent Labs   Lab 07/31/24  0718 08/01/24  0558   MG 1.7 1.7   PHOS 6.5* 5.0        Recent Labs   Lab 07/30/24  0441 07/31/24  0718 08/01/24  0558   PHOS  --  6.5* 5.0   ALB 3.5  --   --        No results found.        Assessment and Plan:       Patient is a 79 year old male with past medical history of coronary artery disease s/p PCI on 5/21, A-fib on anticoagulation, ESRD on hemodialysis Monday Wednesday Friday via AV fistula, hypertension who presented to the emergency room for rectal bleeding and maroon-colored stools     1.ESRD: On HD  HD through AV fistula.  HD at [facility] outpatient Monday Wednesday Friday  HD today-after colonoscopy  Serum Phos within normal limits s/p HD yesterday-tolerated well     2.GI bleed: recurrent .  GI consulted-input noted  S/p CTA of abdomen-no active GI bleed noted  EGD and colonoscopy on 7/31-input noted.  No active bleeding.  EGD and colonoscopy on 6/3 and colonoscopy on 6/21  Anemia: Secondary to GI bleed.  Continue to monitor for VANCE need     3.coronary artery disease s/p PCI on 5/21/ P-Afib  Cardiology input noted. Off of plavix. On ASA and eliquis -resumed.  Consideration of Watchman procedure outpatient  High risk of stent thrombosis and stroke      Discussed with nursing and wife at bedside    Walker Sheppard MD  8/1/2024

## 2024-08-01 NOTE — PROGRESS NOTES
[facility]  Gastroenterology Progress Note    Ollie Hernández Patient Status:  Inpatient    1945 MRN [id number]   Location [facility] 3W/SW Attending Darlin Phipps MD   Hosp Day # 2 PCP Wili Parmar MD     Subjective:  Ollie Hernández is a(n) 79 year old male.    Current complaints: No complaints. No pain.  No large volume rectal bleeding noted.      EGD/Cscope yesterday with unremarkable EGD, cscope with hepatic flexure clip near erythematous mucosa but did not bleed with contact s/p APC. Few additional red spots in AC colon that appeared not clinically significant      Objective:  Blood pressure 155/58, pulse 76, temperature 97.7 °F (36.5 °C), temperature source Oral, resp. rate 22, height 5' 4\" (1.626 m), weight 146 lb 8 oz (66.5 kg), SpO2 99%.  Respiratory: no labored breathing  Abdomen: nondistended, soft, nontender  Neurologic: Ox3    Labs:   Lab Results   Component Value Date    WBC 14.2 2024    HGB 10.5 2024    HCT 31.8 2024    .0 2024    CREATSERUM 4.10 2024    BUN 32 2024     2024    K 4.2 2024     2024    CO2 27.0 2024     2024    CA 9.6 2024    MG 1.7 2024    PHOS 5.0 2024       Imaging:  CTA ABD/PEL (CPT=74174)    Result Date: 2024  CONCLUSION: No active GI bleed    Dictated by (CST): Osmar Ortega MD on 2024 at 2:41 PM     Finalized by (CST): Osmar Ortega MD on 2024 at 2:48 PM           Problem list:  Patient Active Problem List   Diagnosis    Mixed hyperlipidemia    Pulmonary HTN (HCC)    KRAIG (obstructive sleep apnea)    Gout    Type 2 diabetes mellitus with chronic kidney disease on chronic dialysis, with long-term current use of insulin (HCC)    Anemia of chronic renal failure    Chronic diastolic congestive heart failure (HCC)    Vitamin D deficiency    Chronic obstructive asthma (HCC)    Secondary hyperparathyroidism (HCC)    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)    Atherosclerosis of native arteries of extremities with intermittent claudication, bilateral legs (HCC)    Primary hypertension    Smokers' cough (HCC)    Unstable angina (HCC)    Lower GI bleed    ESRD (end stage renal disease) on dialysis (HCC)    Atrial fibrillation (HCC)    AVM (arteriovenous malformation) of colon    Anemia, blood loss    Rectal bleeding    Anticoagulated    Antiplatelet or antithrombotic long-term use    Lower GI bleeding    ESRD on hemodialysis (HCC)     EGD/Cscope 7/31  Normal esophagus and Z-line, 1cm sliding hiatal hernia  Minimal gastric erythema otherwise normal gastric examination  Normal duodenum  Previously placed clip in the hepatic flexure region.  Clip attached to erythematous colon mucosa.  This was nonbleeding.  This was cauterized using APC.  No bleeding was noted before, during or after the procedure.   A couple of ascending colon red spots were cauterized but not bleeding or did not blood with contact.  I do not think that these are of any clinical significance.  Normal terminal ileum  A few scattered diverticulosis, nonbleeding  Small internal hemorrhoids       Assessment/Plan:  Recurrent obscure overt GI hemorrhage  Etiology not know.  Most recent EGD 7/31 unremarkable as well as cscope (previously clipped hepatic flexure lesion did not appear to be source but was treated with APC)  CT angio negative this admission  2      ESRD on HD  3.     Afib, CAD on ASA, Eliquis    Recs:   Diet as tolerated  Resume blood thinners as needed  Strong consideration for Watchman given recurrent bleeding. Cardiology following.     Okay for discharge from Gi standpoint. D/w family and ROSALINO Coronel,   8/1/2024  12:21 PM

## 2024-08-01 NOTE — PROGRESS NOTES
Went over discharge instructions with pt. Went over follow up appointments. Went over medications and when to take them next. Provided education on afib. Returned all bedside belongings. Removed IV and tele. Pt discharging.

## 2024-08-01 NOTE — CM/SW NOTE
08/01/24 1600   CM/SW Referral Data   Referral Source Physician;Social Work (self-referral)   Reason for Referral Discharge planning   Informant Spouse/Significant Other   Medical Hx   Does patient have an established PCP? Yes   Patient Info   Patient's Current Mental Status at Time of Assessment Alert;Oriented   Patient's Home Environment House   Number of Levels in Home 1   Patient lives with Spouse/Significant other   Patient Status Prior to Admission   Independent with ADLs and Mobility Yes   Discharge Needs   Anticipated D/C needs Home health care   Choice of Post-Acute Provider   Informed patient of right to choose their preferred provider Yes   List of appropriate post-acute services provided to patient/family with quality data Yes   [facility]family choice [facility]     SW spoke with pt's spouse regarding HHC after therapy Anticipated therapy need: Home with Home Healthcare.     Spouse agreeable to HHC - states pt had HHC in the past. Spouse agreeable to HC as they had worked with [facility] in the past     [facility] sent referral - able to accept    Notified [facility] on pt's discharge. Orders/F2F entered    PLAN: home with [facility]    Aleja Elaine, IRWINW, MSW ext. [phone]

## 2024-08-01 NOTE — OCCUPATIONAL THERAPY NOTE
OCCUPATIONAL THERAPY EVALUATION - INPATIENT     Room Number: COF14/COF14-A  Evaluation Date: 8/1/2024  Type of Evaluation: Initial  Presenting Problem: lower GI bleed    Physician Order: IP Consult to Occupational Therapy  Reason for Therapy: ADL/IADL Dysfunction and Discharge Planning    OCCUPATIONAL THERAPY ASSESSMENT   Patient is a 79 year old male admitted 7/30/2024 for lower GI bleed.  Prior to admission, patient's baseline is MOD I with ADLs, IADLs, driving.  Patient is currently functioning near baseline with toileting, lower body dressing, bed mobility, and transfers.  Patient is requiring supervision as a result of the following impairments: decreased functional strength, decreased functional reach, decreased endurance, and pain. Occupational Therapy will continue to follow for duration of hospitalization.    Patient will benefit from continued skilled OT Services for duration of hospitalization, however, given the patient is functioning near baseline level do not anticipate skilled therapy needs at discharge     PLAN  OT Treatment Plan: Balance activities;Energy conservation/work simplification techniques;ADL training;IADL training;Functional transfer training;Endurance training;Patient/Family training;Patient/Family education     OCCUPATIONAL THERAPY MEDICAL/SOCIAL HISTORY   Problem List  Principal Problem:    Lower GI bleeding  Active Problems:    ESRD on hemodialysis (HCC)    HOME SITUATION  Type of Home: House  Home Layout: One level  Lives With: Spouse  Toilet and Equipment: Comfort height toilet  Shower/Tub and Equipment: Tub-shower combo; Shower chair; Grab bar  Other Equipment: -- (KHADRA bassett)  Hand Dominance: Right  Drives: Yes  Patient Regularly Uses: None    Stairs in Home: 5  Use of Assistive Device(s): cane, RW    Prior Level of [place]: MOD I with ADLS, IADLs, driving    SUBJECTIVE  \"My balance is a little off\"    OCCUPATIONAL THERAPY EXAMINATION    OBJECTIVE  Precautions: Limb alert -  right  Fall Risk: Standard fall risk    PAIN ASSESSMENT  Rating: Non-verbal signs of pain/discomfort observed  Location: neck, back  Management Techniques: Activity promotion; Body mechanics; Breathing techniques; Relaxation    COGNITION  Overall Cognitive Status:  WFL - within functional limits    VISION  Current Vision: no visual deficits    PERCEPTION  Overall Perception Status:   WFL - within functional limits    SENSATION  Light touch:  intact    Communication: WFL    Behavioral/Emotional/Social: WFL    RANGE OF MOTION   Upper extremity ROM is within functional limits     STRENGTH ASSESSMENT  Upper extremity strength is within functional limits     COORDINATION  Gross Motor: WFL   Fine Motor: WFL     ACTIVITIES OF DAILY LIVING ASSESSMENT  AM-PAC ‘6-Clicks’ Inpatient Daily Activity Short Form  How much help from another person does the patient currently need…  -   Putting on and taking off regular lower body clothing?: A Little  -   Bathing (including washing, rinsing, drying)?: A Little  -   Toileting, which includes using toilet, bedpan or urinal? : A Little  -   Putting on and taking off regular upper body clothing?: None  -   Taking care of personal grooming such as brushing teeth?: None  -   Eating meals?: None    AM-PAC Score:  Score: 21  Approx Degree of Impairment: 32.79%  Standardized Score (AM-PAC Scale): 44.27  CMS Modifier (G-Code): CJ    FUNCTIONAL TRANSFER ASSESSMENT  Sit to Stand: Edge of Bed  Edge of Bed: Supervision    BED MOBILITY     BALANCE ASSESSMENT  Static Sitting: Independent  Sitting Bilateral: Independent  Static Standing: Supervision  Standing Bilateral: Supervision    FUNCTIONAL ADL ASSESSMENT  Eating: Independent  Grooming Seated: Independent  Bathing Seated: Supervision  UB Dressing Seated: Independent  LB Dressing Seated: Supervision  Toileting Seated: Supervision    THERAPEUTIC EXERCISE     Skilled Therapy Provided: Pt seen for skilled OT evaluation to address pt's I and safety  with functional mobility, t/fs, and ADLs. Pt's RN approved session and pt agreeable. Pt performing functional mobility and t/fs with SUPV with RW 2/2 reports of fatigue and decreased balance. Pt performed LBD with SUPV. Pt edu provided re: role of OT, safe t/f techniques, DME/AE for ADLs, fall prevention, safe RW management, and energy conservation principles. Pt demo'd understanding of all concepts covered. End of session, pt sitting in bed, all needs met. RN aware of pt status. Continue skilled OT.       EDUCATION PROVIDED  Patient: Role of Occupational Therapy; Plan of Care; Discharge Recommendations; DME Recommendations; Functional Transfer Techniques; Fall Prevention; Energy Conservation; Compensatory ADL Techniques; Proper Body Mechanics  Patient's Response to Education: Verbalized Understanding; Returned Demonstration    The patient's Approx Degree of Impairment: 32.79% has been calculated based on documentation in the [facility] '6 clicks' Inpatient Daily Activity Short Form.  Research supports that patients with this level of impairment may benefit from home.  Final disposition will be made by interdisciplinary medical team.     Patient End of Session: In bed;Needs met;Call light within reach;RN aware of session/findings;Family present    OT Goals  Patients self stated goal is: go home     Patient will complete functional transfer with MOD I   Comment:     Patient will complete toileting with MOD I   Comment:     Patient will tolerate standing for 3 minutes in prep for adls with MOD I    Comment:    Patient will complete item retrieval with MOD I   Comment:          Goals  on: 8/3/24  Frequency: 3-5x/week    Patient Evaluation Complexity Level:   Occupational Profile/Medical History MODERATE - Expanded review of history including review of medical or therapy record   Specific performance deficits impacting engagement in ADL/IADL MODERATE  3 - 5 performance deficits   Client Assessment/Performance Deficits  MODERATE - Comorbidities and min to mod modifications of tasks    Clinical Decision Making MODERATE - Analysis of occupational profile, detailed assessments, several treatment options    Overall Complexity MODERATE     OT Session Time:  Therapeutic Activity: 15 minutes    Stephanie Ramírez, Occupational Therapist, MS, OTR/L

## 2024-08-02 ENCOUNTER — PATIENT OUTREACH (OUTPATIENT)
Dept: CASE MANAGEMENT | Age: 79
End: 2024-08-02

## 2024-08-02 NOTE — PROGRESS NOTES
NCM attempted to reach the patient to complete a TCM/HFU call. Left message to call back. [facility] provided direct contact info at [phone].

## 2024-08-02 NOTE — HOME CARE LIAISON
Referral received from SW/CM team via [employer]. Discussed with patient's wife Stephy the recommendation for home health services, wife agreeable, and all questions answered.

## 2024-08-05 ENCOUNTER — TELEPHONE (OUTPATIENT)
Dept: INTERNAL MEDICINE CLINIC | Facility: CLINIC | Age: 79
End: 2024-08-05

## 2024-08-05 RX ORDER — FELODIPINE 10 MG/1
10 TABLET, EXTENDED RELEASE ORAL DAILY
Qty: 30 TABLET | Refills: 0 | Status: SHIPPED | OUTPATIENT
Start: 2024-08-05 | End: 2024-08-05

## 2024-08-05 RX ORDER — FELODIPINE 10 MG/1
10 TABLET, EXTENDED RELEASE ORAL DAILY
Qty: 90 TABLET | Refills: 3 | Status: SHIPPED | OUTPATIENT
Start: 2024-08-05

## 2024-08-05 NOTE — TELEPHONE ENCOUNTER
Refill sent for Felodipine.  Active refills on Pantoprazole & Escitalopram.  Apixaban & Tradenta per cardiology, Dr. Phelan.   Spoke with patient and notified.

## 2024-08-05 NOTE — TELEPHONE ENCOUNTER
Pt. Called regarding his refill request for  apixaban 5 MG Oral Tab   - linaGLIPtin (TRADJENTA) 5 mg Oral Tab   - felodipine ER 10 MG Oral Tablet 24 Hr   - pantoprazole 40 MG Oral Tab EC   - escitalopram 5 MG Oral Tab       Pt. States he needs the refills, and he  is now out of the Felodipine.  Please send 90 day refills to [facility] and a 30 day supply of Felodipine to the [facility] in [place].

## 2024-08-05 NOTE — TELEPHONE ENCOUNTER
[facility] with [facility] called with CHRISTIN    [facility] was asked to open patient for Home Health this weekend    Wife declined  - wants to wait until August 10

## 2024-08-06 ENCOUNTER — HOSPITAL ENCOUNTER (EMERGENCY)
Facility: HOSPITAL | Age: 79
Discharge: HOME | End: 2024-08-06
Attending: EMERGENCY MEDICINE
Payer: MEDICARE

## 2024-08-06 VITALS
RESPIRATION RATE: 18 BRPM | HEART RATE: 70 BPM | OXYGEN SATURATION: 99 % | WEIGHT: 156 LBS | TEMPERATURE: 99 F | DIASTOLIC BLOOD PRESSURE: 66 MMHG | BODY MASS INDEX: 26.63 KG/M2 | HEIGHT: 64 IN | SYSTOLIC BLOOD PRESSURE: 198 MMHG

## 2024-08-06 DIAGNOSIS — S01.512A LACERATION OF TONGUE, INITIAL ENCOUNTER: Primary | ICD-10-CM

## 2024-08-06 PROCEDURE — 12011 RPR F/E/E/N/L/M 2.5 CM/<: CPT

## 2024-08-06 PROCEDURE — 99284 EMERGENCY DEPT VISIT MOD MDM: CPT

## 2024-08-06 PROCEDURE — 99283 EMERGENCY DEPT VISIT LOW MDM: CPT

## 2024-08-07 ENCOUNTER — HOSPITAL ENCOUNTER (EMERGENCY)
Facility: HOSPITAL | Age: 79
Discharge: LEFT BEFORE DISCHARGE | End: 2024-08-07
Payer: MEDICARE

## 2024-08-07 VITALS
HEART RATE: 73 BPM | DIASTOLIC BLOOD PRESSURE: 72 MMHG | TEMPERATURE: 98 F | RESPIRATION RATE: 20 BRPM | OXYGEN SATURATION: 99 % | SYSTOLIC BLOOD PRESSURE: 185 MMHG

## 2024-08-07 NOTE — ED INITIAL ASSESSMENT (HPI)
PT c/o tongue laceratoin, seen before for same, discharged, however states bleeding has been persistent.

## 2024-08-07 NOTE — ED INITIAL ASSESSMENT (HPI)
Pt presents with c/o a cut on his tongue from eating a peach.  Pt reports that he is on blood thinners and was just recently discharged from the hospital on Thursday.  Pt has a piece of gauze currently on his tongue.  Bleeding is minimal.

## 2024-08-07 NOTE — ED PROVIDER NOTES
Patient Seen in: [facility] Emergency Department    History     Chief Complaint   Patient presents with    Bleeding       HPI    The patient presents to the ED after sustaining a laceration to the tip of his tongue from a peach pit tonight.  He is on blood thinners and states the bleeding is not resolved.  Presents for ongoing bleeding.  Denies other complaints.    History reviewed.   Past Medical History:    Anemia    Asthma (HCC)    Back problem    BPH (benign prostatic hyperplasia)    Calculus of kidney    Cataract    Coronary atherosclerosis    Diabetes (HCC)    ESRD (end stage renal disease) on dialysis (HCC)    Essential hypertension    High blood pressure    High cholesterol    History of blood transfusion    Hyperlipidemia    Neuropathy    hands and feet    KRAIG on CPAP    Renal disorder    Sleep apnea    Visual impairment    glasses    Vocal cord paralysis, unilateral partial       History reviewed.   Past Surgical History:   Procedure Laterality Date    Appendectomy      1981    Back surgery      Neck/back - 1998    Capsule endoscopy - internal referral      Cataract      12/2021 and 1/2022    Cath bare metal stent (bms)      Colonoscopy      Colonoscopy N/A 01/25/2021    Procedure: COLONOSCOPY;  Surgeon: Michael Gautam MD;  Location: [facility] ENDO    Colonoscopy N/A 06/03/2024    Procedure: COLONOSCOPY;  Surgeon: Gabriel Saldana MD;  Location: [facility] ENDOSCOPY    Colonoscopy N/A 06/15/2024    Procedure: COLONOSCOPY;  Surgeon: Carlyn Storey MD;  Location: [facility] ENDOSCOPY    Colonoscopy N/A 7/31/2024    Procedure: COLONOSCOPY;  Surgeon: Gabriel Saldana MD;  Location: [facility] ENDOSCOPY    Hand/finger surgery unlisted      Accidental trauma    Spine surgery procedure unlisted      Upper gi endoscopy,diagnosis           Medications :  (Not in a hospital admission)       Family History   Problem Relation Age of Onset    Cancer Father         Kidney    Breast Cancer Mother     Diabetes Mother     Diabetes  Maternal Grandmother     Diabetes Maternal Grandfather     Heart Disorder Other         Uncle       Smoking Status:   Social History     Socioeconomic History    Marital status:    Tobacco Use    Smoking status: Former     Current packs/day: 0.00     Average packs/day: 1 pack/day for 17.0 years (17.0 ttl pk-yrs)     Types: Cigarettes     Start date: 1964     Quit date: 1981     Years since quittin.6    Smokeless tobacco: Never   Vaping Use    Vaping status: Never Used   Substance and Sexual Activity    Alcohol use: No     Alcohol/week: 0.0 standard drinks of alcohol    Drug use: No    Sexual activity: Yes     Partners: Female       Constitutional and vital signs reviewed.      Social History and Family History elements reviewed from today, pertinent positives to the presenting problem noted.    Physical Exam     ED Triage Vitals   BP 24 2136 (!) 184/71   Pulse 24 2136 75   Resp 24 2136 20   Temp 24 2136 99 °F (37.2 °C)   Temp src 24 2136 Oral   SpO2 24 2136 100 %   O2 Device 24 2336 None (Room air)       All measures to prevent infection transmission during my interaction with the patient were taken. Handwashing was performed prior to and after the exam.  Stethoscope and any equipment used during my examination was cleaned with super sani-cloth germicidal wipes following the exam.     Physical Exam  Constitutional:       Appearance: Normal appearance.   HENT:      Mouth/Throat:      Comments: 2 mm laceration of the tip of the patient's tongue with ongoing bleeding noted.  Pulmonary:      Effort: Pulmonary effort is normal. No respiratory distress.   Neurological:      Mental Status: He is alert. Mental status is at baseline.   Psychiatric:         Mood and Affect: Mood normal.         Behavior: Behavior normal.         ED Course      Labs Reviewed - No data to display    As Interpreted by me    Imaging Results Available and Reviewed while in ED: No  results found.  ED Medications Administered: Medications - No data to display      MDM     Vitals:    08/06/24 2136 08/06/24 2336   BP: (!) 184/71 (!) 198/66   Pulse: 75 70   Resp: 20 18   Temp: 99 °F (37.2 °C)    TempSrc: Oral    SpO2: 100% 99%   Weight: 70.8 kg    Height: 162.6 cm (5' 4\")      *I personally reviewed and interpreted all ED vitals.    Pulse Ox: 99%, Room air, Normal     Differential Diagnosis/ Diagnostic Considerations: Tongue laceration, bleeding    Complicating Factors: The patient already has does not have any pertinent problems on file. to contribute to the complexity of this ED evaluation.    Medical Decision Making  The patient presents to the ED with a small laceration to the tip of his tongue that has nonstop bleeding for the past several hours.  I repaired the laceration and bleeding resolved.  Stable for discharge with outpatient follow-up.    Procedure:  Laceration repair:  Verbal consent was obtained from the patient. Sterile technique. The 0.2 cm laceration located to the tip of the tongue was explored.   There were no deep structures involved.    The wound was repaired with skin adhesive.  The wound repair was simple.  The procedure was performed by myself.      Problems Addressed:  Laceration of tongue, initial encounter: acute illness or injury    Risk  Minor surgery with identified risk factors.        Condition upon leaving the department: Stable    Disposition and Plan     Clinical Impression:  1. Laceration of tongue, initial encounter        Disposition:  Discharge    Follow-up:  Wili Parmar MD  [address] [place] [id number]  [phone]    Schedule an appointment as soon as possible for a visit in 3 day(s)      [facility] Emergency Department  [address] [facility] [address]  [phone]  Follow up  If symptoms worsen      Medications Prescribed:  Discharge Medication List as of 8/6/2024 11:36 PM

## 2024-08-09 ENCOUNTER — HOSPITAL ENCOUNTER (INPATIENT)
Facility: HOSPITAL | Age: 79
LOS: 4 days | Discharge: HOME | End: 2024-08-13
Attending: EMERGENCY MEDICINE | Admitting: HOSPITALIST
Payer: MEDICARE

## 2024-08-09 ENCOUNTER — HOSPITAL ENCOUNTER (INPATIENT)
Facility: HOSPITAL | Age: 79
End: 2024-08-09
Attending: EMERGENCY MEDICINE | Admitting: HOSPITALIST
Payer: MEDICARE

## 2024-08-09 ENCOUNTER — APPOINTMENT (OUTPATIENT)
Dept: CT IMAGING | Facility: HOSPITAL | Age: 79
End: 2024-08-09
Attending: EMERGENCY MEDICINE
Payer: MEDICARE

## 2024-08-09 DIAGNOSIS — K62.5 RECTAL BLEEDING: Primary | ICD-10-CM

## 2024-08-09 PROBLEM — K92.2 GI BLEED: Status: ACTIVE | Noted: 2024-01-01

## 2024-08-09 PROBLEM — K92.2 GI BLEED: Status: ACTIVE | Noted: 2024-08-09

## 2024-08-09 LAB
ALBUMIN SERPL-MCNC: 3.2 G/DL (ref 3.2–4.8)
ALP LIVER SERPL-CCNC: 43 U/L
ALT SERPL-CCNC: 16 U/L
ANION GAP SERPL CALC-SCNC: 5 MMOL/L (ref 0–18)
ANTIBODY SCREEN: NEGATIVE
APTT PPP: 30.1 SECONDS (ref 23–36)
AST SERPL-CCNC: 17 U/L (ref ?–34)
ATRIAL RATE: 69 BPM
BASOPHILS # BLD AUTO: 0.03 X10(3) UL (ref 0–0.2)
BASOPHILS NFR BLD AUTO: 0.4 %
BILIRUB DIRECT SERPL-MCNC: 0.1 MG/DL (ref ?–0.3)
BILIRUB SERPL-MCNC: 0.3 MG/DL (ref 0.2–1.1)
BUN BLD-MCNC: 33 MG/DL (ref 9–23)
BUN/CREAT SERPL: 10.4 (ref 10–20)
CALCIUM BLD-MCNC: 8.7 MG/DL (ref 8.7–10.4)
CHLORIDE SERPL-SCNC: 100 MMOL/L (ref 98–112)
CO2 SERPL-SCNC: 33 MMOL/L (ref 21–32)
CREAT BLD-MCNC: 3.16 MG/DL
DEPRECATED RDW RBC AUTO: 56.2 FL (ref 35.1–46.3)
EGFRCR SERPLBLD CKD-EPI 2021: 19 ML/MIN/1.73M2 (ref 60–?)
EOSINOPHIL # BLD AUTO: 0.13 X10(3) UL (ref 0–0.7)
EOSINOPHIL NFR BLD AUTO: 1.6 %
ERYTHROCYTE [DISTWIDTH] IN BLOOD BY AUTOMATED COUNT: 16.3 % (ref 11–15)
GLUCOSE BLD-MCNC: 143 MG/DL (ref 70–99)
GLUCOSE BLDC GLUCOMTR-MCNC: 122 MG/DL (ref 70–99)
GLUCOSE BLDC GLUCOMTR-MCNC: 138 MG/DL (ref 70–99)
GLUCOSE BLDC GLUCOMTR-MCNC: 143 MG/DL (ref 70–99)
HCT VFR BLD AUTO: 16.2 %
HCT VFR BLD AUTO: 20.5 %
HGB BLD-MCNC: 5.3 G/DL
HGB BLD-MCNC: 6.7 G/DL
IMM GRANULOCYTES # BLD AUTO: 0.06 X10(3) UL (ref 0–1)
IMM GRANULOCYTES NFR BLD: 0.7 %
INR BLD: 1.45 (ref 0.8–1.2)
LYMPHOCYTES # BLD AUTO: 1.23 X10(3) UL (ref 1–4)
LYMPHOCYTES NFR BLD AUTO: 15 %
MCH RBC QN AUTO: 30.6 PG (ref 26–34)
MCHC RBC AUTO-ENTMCNC: 32.7 G/DL (ref 31–37)
MCV RBC AUTO: 93.6 FL
MONOCYTES # BLD AUTO: 0.45 X10(3) UL (ref 0.1–1)
MONOCYTES NFR BLD AUTO: 5.5 %
NEUTROPHILS # BLD AUTO: 6.29 X10 (3) UL (ref 1.5–7.7)
NEUTROPHILS # BLD AUTO: 6.29 X10(3) UL (ref 1.5–7.7)
NEUTROPHILS NFR BLD AUTO: 76.8 %
OSMOLALITY SERPL CALC.SUM OF ELEC: 296 MOSM/KG (ref 275–295)
P AXIS: 28 DEGREES
P-R INTERVAL: 158 MS
PLATELET # BLD AUTO: 156 10(3)UL (ref 150–450)
POTASSIUM SERPL-SCNC: 3.9 MMOL/L (ref 3.5–5.1)
PROT SERPL-MCNC: 5.6 G/DL (ref 5.7–8.2)
PROTHROMBIN TIME: 18.5 SECONDS (ref 11.6–14.8)
Q-T INTERVAL: 434 MS
QRS DURATION: 76 MS
QTC CALCULATION (BEZET): 465 MS
R AXIS: 3 DEGREES
RBC # BLD AUTO: 1.73 X10(6)UL
RH BLOOD TYPE: POSITIVE
SODIUM SERPL-SCNC: 138 MMOL/L (ref 136–145)
T AXIS: 77 DEGREES
VENTRICULAR RATE: 69 BPM
WBC # BLD AUTO: 8.2 X10(3) UL (ref 4–11)

## 2024-08-09 PROCEDURE — 99223 1ST HOSP IP/OBS HIGH 75: CPT | Performed by: HOSPITALIST

## 2024-08-09 PROCEDURE — 30233N1 TRANSFUSION OF NONAUTOLOGOUS RED BLOOD CELLS INTO PERIPHERAL VEIN, PERCUTANEOUS APPROACH: ICD-10-PCS | Performed by: EMERGENCY MEDICINE

## 2024-08-09 PROCEDURE — 74174 CTA ABD&PLVS W/CONTRAST: CPT | Performed by: EMERGENCY MEDICINE

## 2024-08-09 RX ORDER — MAGNESIUM CARB/ALUMINUM HYDROX 105-160MG
296 TABLET,CHEWABLE ORAL ONCE
Status: COMPLETED | OUTPATIENT
Start: 2024-08-10 | End: 2024-08-10

## 2024-08-09 RX ORDER — ACETAMINOPHEN 500 MG
500 TABLET ORAL EVERY 4 HOURS PRN
Status: DISCONTINUED | OUTPATIENT
Start: 2024-08-09 | End: 2024-08-13

## 2024-08-09 RX ORDER — ONDANSETRON 2 MG/ML
4 INJECTION INTRAMUSCULAR; INTRAVENOUS EVERY 6 HOURS PRN
Status: DISCONTINUED | OUTPATIENT
Start: 2024-08-09 | End: 2024-08-13

## 2024-08-09 RX ORDER — NICOTINE POLACRILEX 4 MG
30 LOZENGE BUCCAL
Status: DISCONTINUED | OUTPATIENT
Start: 2024-08-09 | End: 2024-08-13

## 2024-08-09 RX ORDER — DEXTROSE MONOHYDRATE 25 G/50ML
50 INJECTION, SOLUTION INTRAVENOUS
Status: DISCONTINUED | OUTPATIENT
Start: 2024-08-09 | End: 2024-08-13

## 2024-08-09 RX ORDER — METOCLOPRAMIDE HYDROCHLORIDE 5 MG/ML
10 INJECTION INTRAMUSCULAR; INTRAVENOUS EVERY 8 HOURS PRN
Status: DISCONTINUED | OUTPATIENT
Start: 2024-08-09 | End: 2024-08-09

## 2024-08-09 RX ORDER — METOCLOPRAMIDE HYDROCHLORIDE 5 MG/ML
5 INJECTION INTRAMUSCULAR; INTRAVENOUS EVERY 8 HOURS PRN
Status: DISCONTINUED | OUTPATIENT
Start: 2024-08-09 | End: 2024-08-12

## 2024-08-09 RX ORDER — IPRATROPIUM BROMIDE AND ALBUTEROL SULFATE 2.5; .5 MG/3ML; MG/3ML
3 SOLUTION RESPIRATORY (INHALATION) ONCE
Status: COMPLETED | OUTPATIENT
Start: 2024-08-09 | End: 2024-08-09

## 2024-08-09 RX ORDER — ONDANSETRON 2 MG/ML
4 INJECTION INTRAMUSCULAR; INTRAVENOUS ONCE
Status: COMPLETED | OUTPATIENT
Start: 2024-08-09 | End: 2024-08-09

## 2024-08-09 RX ORDER — NICOTINE POLACRILEX 4 MG
15 LOZENGE BUCCAL
Status: DISCONTINUED | OUTPATIENT
Start: 2024-08-09 | End: 2024-08-13

## 2024-08-09 NOTE — ED PROVIDER NOTES
Patient Seen in: [facility] Emergency Department      History     Chief Complaint   Patient presents with    GI Bleeding     Stated Complaint: Bleeding    Subjective:   HPI  79-year-old male with atrial fibrillation on Eliquis, CAD status post PCI in 2024, heart failure, ESRD on hemodialysis Monday Wednesday Friday, who presents for evaluation of GI bleeding.  He notes bright red stool per rectum about 2-3 times today.  He did vomit today but is unsure the contents of the emesis.  No fevers.  He has some lower abdominal pain.  He took Eliquis this morning.      Objective:   Past Medical History:    Anemia    Asthma (HCC)    Back problem    BPH (benign prostatic hyperplasia)    Calculus of kidney    Cataract    Coronary atherosclerosis    Diabetes (HCC)    ESRD (end stage renal disease) on dialysis (HCC)    Essential hypertension    High blood pressure    High cholesterol    History of blood transfusion    Hyperlipidemia    Neuropathy    hands and feet    KRAIG on CPAP    Renal disorder    Sleep apnea    Visual impairment    glasses    Vocal cord paralysis, unilateral partial              Past Surgical History:   Procedure Laterality Date    Appendectomy      1981    Back surgery      Neck/back - 1998    Capsule endoscopy - internal referral      Cataract      12/2021 and 1/2022    Cath bare metal stent (bms)      Colonoscopy      Colonoscopy N/A 01/25/2021    Procedure: COLONOSCOPY;  Surgeon: Michael Gautam MD;  Location: [facility] ENDO    Colonoscopy N/A 06/03/2024    Procedure: COLONOSCOPY;  Surgeon: Gabriel Saldana MD;  Location: [facility] ENDOSCOPY    Colonoscopy N/A 06/15/2024    Procedure: COLONOSCOPY;  Surgeon: Carlyn Storey MD;  Location: [facility] ENDOSCOPY    Colonoscopy N/A 7/31/2024    Procedure: COLONOSCOPY;  Surgeon: Gabriel Saldana MD;  Location: [facility] ENDOSCOPY    Hand/finger surgery unlisted      Accidental trauma    Spine surgery procedure unlisted      Upper gi endoscopy,diagnosis                  Social  History     Socioeconomic History    Marital status:    Tobacco Use    Smoking status: Former     Current packs/day: 0.00     Average packs/day: 1 pack/day for 17.0 years (17.0 ttl pk-yrs)     Types: Cigarettes     Start date: 1964     Quit date: 1981     Years since quittin.6    Smokeless tobacco: Never   Vaping Use    Vaping status: Never Used   Substance and Sexual Activity    Alcohol use: No     Alcohol/week: 0.0 standard drinks of alcohol    Drug use: No    Sexual activity: Yes     Partners: Female     Social Determinants of Health     Financial Resource Strain: Low Risk  (2024)    Financial Resource Strain     Difficulty of Paying Living Expenses: Not hard at all     Med Affordability: No   Food Insecurity: No Food Insecurity (2024)    Food Insecurity     Food Insecurity: Never true   Transportation Needs: No Transportation Needs (2024)    Transportation Needs     Lack of Transportation: No   Housing Stability: Low Risk  (2024)    Housing Stability     Housing Instability: No              Review of Systems    Positive for stated Chief Complaint: GI Bleeding    Other systems are as noted in HPI.  Constitutional and vital signs reviewed.      All other systems reviewed and negative except as noted above.    Physical Exam     ED Triage Vitals [24 1345]   BP 97/41   Pulse 78   Resp 20   Temp 97.1 °F (36.2 °C)   Temp src Temporal   SpO2 100 %   O2 Device None (Room air)       Current Vitals:   Vital Signs  BP: 112/51  Pulse: 70  Resp: 12  Temp: 97.1 °F (36.2 °C)  Temp src: Temporal  MAP (mmHg): 68    Oxygen Therapy  SpO2: 100 %  O2 Device: Nasal cannula (placed on 1L NC for comfort, pt oxygenating at 98% in ED)            Physical Exam  Vitals and nursing note reviewed.   Constitutional:       Appearance: He is well-developed. He is ill-appearing.   HENT:      Head: Normocephalic and atraumatic.   Eyes:      Extraocular Movements: Extraocular movements intact.    Cardiovascular:      Rate and Rhythm: Normal rate and regular rhythm.      Heart sounds: Normal heart sounds.   Pulmonary:      Effort: Pulmonary effort is normal.      Breath sounds: Normal breath sounds.   Abdominal:      General: There is no distension.      Palpations: Abdomen is soft.      Tenderness: There is no abdominal tenderness.   Genitourinary:     Comments: ED RN Paulina present at bedside during exam.  Normal rectal tone, drew maroon colored blood present in the rectal vault  Musculoskeletal:         General: Normal range of motion.      Cervical back: Normal range of motion.   Skin:     General: Skin is warm.   Neurological:      Mental Status: He is alert.      Comments: No focal deficits       Differential diagnose includes but is not limited to AVM, diulafoy lesion, anemia, less likely brisk upper GI bleed        ED Course     Labs Reviewed   BASIC METABOLIC PANEL (8) - Abnormal; Notable for the following components:       Result Value    Glucose 143 (*)     CO2 33.0 (*)     BUN 33 (*)     Creatinine 3.16 (*)     Calculated Osmolality 296 (*)     eGFR-Cr 19 (*)     All other components within normal limits   CBC WITH DIFFERENTIAL WITH PLATELET - Abnormal; Notable for the following components:    RBC 1.73 (*)     HGB 5.3 (*)     HCT 16.2 (*)     RDW-SD 56.2 (*)     RDW 16.3 (*)     All other components within normal limits   PROTHROMBIN TIME (PT) - Abnormal; Notable for the following components:    PT 18.5 (*)     INR 1.45 (*)     All other components within normal limits   HEPATIC FUNCTION PANEL (7) - Abnormal; Notable for the following components:    Alkaline Phosphatase 43 (*)     Total Protein 5.6 (*)     All other components within normal limits   PTT, ACTIVATED - Normal   TYPE AND SCREEN    Narrative:     The following orders were created for panel order Type and screen.  Procedure                               Abnormality         Status                     ---------                                -----------         ------                     ABORH (Blood Type)[[id number]]                               Final result               Antibody Screen[[id number]]                                  Final result                 Please view results for these tests on the individual orders.   ABORH (BLOOD TYPE)   ANTIBODY SCREEN   PREPARE RBC     EKG at 1445    Rate, intervals and axes as noted on EKG Report.  Rate: 69  Rhythm: Sinus Rhythm  Reading: No STEMI, no ectopy                 CTA ABD/PEL (CPT=74174)    Result Date: 8/9/2024  CONCLUSION:   No active contrast extravasation.  No intra-abdominal or intrapelvic hematoma.  Nondilated fluid-filled loops of small bowel are nonspecific but can be seen with enteritis. Liquid stool seen throughout the colon suggestive of diarrhea.  Overall constellation of findings are suggestive of a mild diffuse enterocolitis, likely from infectious or inflammatory etiology.  No obstruction.   Peripheral interstitial and reticular opacities within the bilateral lower lungs is partially seen suggestive of developing fibrotic changes or interstitial lung disease.  No honeycombing is seen within the visualized lower lungs.   Multiple other incidental findings as described in the body of the report which are unchanged.  elm-remote    Dictated by (CST): Jeff Novak MD on 8/09/2024 at 3:15 PM     Finalized by (CST): Jeff Novak MD on 8/09/2024 at 3:25 PM                  [facility]        Admission disposition: 8/9/2024  3:29 PM                  I spent a total of 50 minutes of critical care time in obtaining history, performing a physical exam, bedside monitoring of interventions, collecting and interpreting tests and discussion with consultants but not including time spent performing procedures.                Medical Decision Making  On arrival to the ED, patient is hypotensive, 500 mL fluid bolus given.  Hemoglobin today concerning at 5.3.  Given concern for active bleeding, 3 units PRBC  transfusion ordered.  BP stabilized thereafter.  Per my independent interpretation of CT abdomen pelvis, no active contrast extravasation.  I discussed with Dr. Bustamante with GI who will plan for capsule endoscopy tomorrow once patient has stabilized hemodynamically.  Discussed with Dr. Julio for cardiology who evaluated patient at bedside.  Discussed with Dr. Musa Sheppard for nephrology consultation. D/w Dr Amaro for admission.        Problems Addressed:  Rectal bleeding: complicated acute illness or injury with systemic symptoms that poses a threat to life or bodily functions    Amount and/or Complexity of Data Reviewed  External Data Reviewed: notes.     Details: Reviewed EGD and colonoscopy in June 2024 which showed AVM versus Dula Suraj the transverse colon which was cauterized and clipped.  Reviewed EGD report in mid June 2024 which showed 1 red spot that was clipped again.  Reviewed colonoscopy report from 7/31/2024 which showed no active bleeding.      Labs: ordered. Decision-making details documented in ED Course.  Radiology: ordered and independent interpretation performed. Decision-making details documented in ED Course.  ECG/medicine tests: ordered and independent interpretation performed. Decision-making details documented in ED Course.  Discussion of management or test interpretation with external provider(s): As above    Risk  Drug therapy requiring intensive monitoring for toxicity.  Decision regarding hospitalization.        Disposition and Plan     Clinical Impression:  1. Rectal bleeding         Disposition:  Admit  8/9/2024  3:29 pm    Follow-up:  No follow-up provider specified.  We recommend that you schedule follow up care with a primary care provider within the next three months to obtain basic health screening including reassessment of your blood pressure.      Medications Prescribed:  Current Discharge Medication List                            Hospital Problems       Present on Admission   Date Reviewed: 7/31/2024            ICD-10-CM Noted POA    * (Principal) Rectal bleeding K62.5 6/13/2024 Unknown

## 2024-08-09 NOTE — ED QUICK NOTES
Orders for admission, patient is aware of plan and ready to go upstairs. Any questions, please call ED RN rosi at extension [phone].     Patient Covid vaccination status: Fully vaccinated     COVID Test Ordered in ED: None    COVID Suspicion at Admission: N/A    Running Infusions:      Mental Status/LOC at time of transport: AAOx4    Other pertinent information: hemodialysis cath to right arm, walks with cane/walker at home, NPO in ED, last ate around 0800 this morning  CIWA score: N/A   NIH score:  N/A

## 2024-08-09 NOTE — CONSULTS
[facility] CARDIOLOGY CONSULT      Ollie Hernández is a 79 year old male who I assessed as follows:  Chief Complaint   Patient presents with    GI Bleeding          REASON FOR CONSULTATION:    atrial fib, anemia            ASSESSMENT/PLAN:     IMPRESSIONS:  Recurrent GI bleeding with Hgb 5.3  PAF, currently SR  CAD s/p [place] circ 5/21/24  Chronic diastolic CHF  ESRD on HD  HTN  HL, on statin        PLAN:  Saw Dr Smith 8/8/24 for watchman eval. To get CTA DEANA 8/22/24 with return visit to see structural NP on 8/27/24  Hold DOAC and ASA for now.   Records from admission 1-2 weeks ago reviewed.   To get PRBC transfusions. Consider lasix/dialysis somewhere in between  Discussed with wife at bedside in ER        Patient Dr Phelan                --------------------------------------------------------------------------------------------------------------------------------    HPI:         History PAF, CAD s/p PCI circ 5/24 has had multiple admissions for GI bleed. Last was 7/30/24. Had EGD/colonoscopy then: not revealing. Eliquis and ASA resumed a week ago. Saw Dr Smith for LAAO device eval yesterday. CTA DEANA scheduled in 2 weeks.     Now with dyspnea and BRBPR. Emesis earlier. No chest pain. Hgb 5.3. ECG SR with TWI AVL only. Took eliquis this morning.            Current Outpatient Medications   Medication Sig Dispense Refill    felodipine ER 10 MG Oral Tablet 24 Hr Take 1 tablet (10 mg total) by mouth daily. 90 tablet 3    PANTOPRAZOLE 40 MG Oral Tab EC TAKE 1 TABLET BY MOUTH EVERY  MORNING BEFORE BREAKFAST 90 tablet 3    aspirin 81 MG Oral Tab EC Take 1 tablet (81 mg total) by mouth daily. 90 tablet 3    linaGLIPtin (TRADJENTA) 5 mg Oral Tab Take 1 tablet (5 mg total) by mouth daily. 90 tablet 1    escitalopram 5 MG Oral Tab Take 1 tablet (5 mg total) by mouth daily. 90 tablet 3    carvedilol 25 MG Oral Tab Take 1 tablet (25 mg total) by mouth 2 (two) times daily.      atorvastatin 40 MG Oral Tab Take  1 tablet (40 mg total) by mouth nightly. 90 tablet 3    apixaban 5 MG Oral Tab Take 1 tablet (5 mg total) by mouth 2 (two) times daily.      acetaminophen 500 MG Oral Tab Take 1 tablet (500 mg total) by mouth daily as needed for Pain.      cetirizine 10 MG Oral Tab Take 1 tablet (10 mg total) by mouth every other day.      Cholecalciferol 125 MCG (5000 UT) Oral Tab Take 1 tablet (5,000 Units total) by mouth 2 (two) times daily.      polyethylene glycol, PEG 3350, 17 g Oral Powd Pack 17 g Wed, Thurs, Sat      tetrahydrozoline 0.05 % Ophthalmic Solution 1 drop 2 (two) times daily as needed.      traMADol 50 MG Oral Tab Take 1 tablet (50 mg total) by mouth every 12 (twelve) hours as needed for Pain.      Glucose Blood (CONTOUR NEXT TEST) In Vitro Strip Test 3 times daily 300 each 1    fluticasone propionate 50 MCG/ACT Nasal Suspension 2 sprays by Nasal route daily. 48 g 3    methocarbamol 500 MG Oral Tab Take 1 tablet (500 mg total) by mouth 2 (two) times daily as needed. 60 tablet 1    albuterol (PROAIR HFA) 108 (90 Base) MCG/ACT Inhalation Aero Soln Inhale 2 puffs into the lungs every 4 (four) hours as needed for Wheezing. 3 each 3    Budesonide-Formoterol Fumarate (SYMBICORT) 160-4.5 MCG/ACT Inhalation Aerosol Inhale 2 puffs into the lungs 2 (two) times daily. 3 each 3    Darbepoetin Randell 60 MCG/ML Injection Solution Inject into the vein as needed.        Past Medical History:    Anemia    Asthma (HCC)    Back problem    BPH (benign prostatic hyperplasia)    Calculus of kidney    Cataract    Coronary atherosclerosis    Diabetes (HCC)    ESRD (end stage renal disease) on dialysis (HCC)    Essential hypertension    High blood pressure    High cholesterol    History of blood transfusion    Hyperlipidemia    Neuropathy    hands and feet    KRAIG on CPAP    Renal disorder    Sleep apnea    Visual impairment    glasses    Vocal cord paralysis, unilateral partial     Past Surgical History:   Procedure Laterality Date     Appendectomy          Back surgery      Neck/back -     Capsule endoscopy - internal referral      Cataract      2021 and 2022    Cath bare metal stent (bms)      Colonoscopy      Colonoscopy N/A 2021    Procedure: COLONOSCOPY;  Surgeon: Michael Gautam MD;  Location: [facility] ENDO    Colonoscopy N/A 2024    Procedure: COLONOSCOPY;  Surgeon: Gabriel Saldana MD;  Location: [facility] ENDOSCOPY    Colonoscopy N/A 06/15/2024    Procedure: COLONOSCOPY;  Surgeon: Carlyn Storey MD;  Location: [facility] ENDOSCOPY    Colonoscopy N/A 2024    Procedure: COLONOSCOPY;  Surgeon: Gabriel Saldana MD;  Location: [facility] ENDOSCOPY    Hand/finger surgery unlisted      Accidental trauma    Spine surgery procedure unlisted      Upper gi endoscopy,diagnosis       Family History   Problem Relation Age of Onset    Cancer Father         Kidney    Breast Cancer Mother     Diabetes Mother     Diabetes Maternal Grandmother     Diabetes Maternal Grandfather     Heart Disorder Other         Uncle      Social History:  Social History     Socioeconomic History    Marital status:    Tobacco Use    Smoking status: Former     Current packs/day: 0.00     Average packs/day: 1 pack/day for 17.0 years (17.0 ttl pk-yrs)     Types: Cigarettes     Start date: 1964     Quit date: 1981     Years since quittin.6    Smokeless tobacco: Never   Vaping Use    Vaping status: Never Used   Substance and Sexual Activity    Alcohol use: No     Alcohol/week: 0.0 standard drinks of alcohol    Drug use: No    Sexual activity: Yes     Partners: Female     Social Determinants of Health     Financial Resource Strain: Low Risk  (2024)    Financial Resource Strain     Difficulty of Paying Living Expenses: Not hard at all     Med Affordability: No   Food Insecurity: No Food Insecurity (2024)    Food Insecurity     Food Insecurity: Never true   Transportation Needs: No Transportation Needs (2024)    Transportation Needs     Lack of  Transportation: No   Housing Stability: Low Risk  (2024)    Housing Stability     Housing Instability: No          Medications:  No current facility-administered medications for this encounter.           Allergies  Allergies   Allergen Reactions    Adhesive Tape OTHER (SEE COMMENTS)     Severe rashes    Dust Mite Extract RASH               REVIEW OF SYSTEMS:   GENERAL HEALTH: no fevers  SKIN: denies any unusual skin lesions or rashes   EARS: no recent changes in hearing  EYE: no recent vision changes  THROAT: denies sore throat  RESPIRATORY: dyspnea  CARDIOVASCULAR: denies chest pain, syncope, or palpitations  GI: blood per rectum  NEURO: denies headaches and focal neurologic symptoms  PSYCH: no recent depression  ENDOCRINE: no change in sleep pattern  HEME: anemia  All other systems reviewed and negative.          EXAM:         TELE: SR          /43   Pulse 68   Temp 97.1 °F (36.2 °C) (Temporal)   Resp 11   Ht 5' 4\" (1.626 m)   Wt 154 lb (69.9 kg)   SpO2 100%   BMI 26.43 kg/m²   Temp (24hrs), Av.1 °F (36.2 °C), Min:97.1 °F (36.2 °C), Max:97.1 °F (36.2 °C)    Wt Readings from Last 3 Encounters:   24 154 lb (69.9 kg)   24 156 lb (70.8 kg)   24 146 lb 8 oz (66.5 kg)         Intake/Output Summary (Last 24 hours) at 2024 1602  Last data filed at 2024 1539  Gross per 24 hour   Intake 500 ml   Output --   Net 500 ml         GENERAL: well developed, well nourished, in no apparent distress  SKIN: no rashes  HEENT: atraumatic, normocephalic, throat without erythema  NECK: supple, no bruits  LUNGS: clear to auscultation  CARDIO: RRR without murmur or S3   GI: soft, nontender  EXTREMITIES: no cyanosis, clubbing or edema  NEUROLOGY: alert  PSYCH: cooperative          LABORATORY DATA:               ECG: SR, TWI AVL, no other acute changes        ECHO:          Labs:  Recent Labs   Lab 24  1441   *   BUN 33*   CREATSERUM 3.16*   CA 8.7      K 3.9      CO2  33.0*     No results for input(s): \"TROP\" in the last 168 hours.  Recent Labs   Lab 08/09/24  1441   RBC 1.73*   HGB 5.3*   HCT 16.2*   MCV 93.6   MCH 30.6   MCHC 32.7   RDW 16.3*   NEPRELIM 6.29   WBC 8.2   .0     No results for input(s): \"TROP\", \"TROPHS\", \"CK\" in the last 168 hours.    Imaging:  CTA ABD/PEL (CPT=74174)    Result Date: 8/9/2024  CONCLUSION:   No active contrast extravasation.  No intra-abdominal or intrapelvic hematoma.  Nondilated fluid-filled loops of small bowel are nonspecific but can be seen with enteritis. Liquid stool seen throughout the colon suggestive of diarrhea.  Overall constellation of findings are suggestive of a mild diffuse enterocolitis, likely from infectious or inflammatory etiology.  No obstruction.   Peripheral interstitial and reticular opacities within the bilateral lower lungs is partially seen suggestive of developing fibrotic changes or interstitial lung disease.  No honeycombing is seen within the visualized lower lungs.   Multiple other incidental findings as described in the body of the report which are unchanged.  elm-remote    Dictated by (CST): Jeff Novka MD on 8/09/2024 at 3:15 PM     Finalized by (CST): Jeff Novak MD on 8/09/2024 at 3:25 PM                CATH 5/24:  ANGIOGRAPHY:    Left Main: Large vessel bifurcating into the LAD and circumflex.  20% distal disease.     LAD: Large vessel that gives off a large diagonal branch and wraps the apex to supply a portion of the inferior wall.  There is a 70% lesion of the apical LAD as it wraps the apex.     Lcx: Large vessel that gives off a large OM1 branch.  There is a discrete 80% lesion of the circumflex just proximal to the OM1 takeoff.     RCA: Small vessel that gives off the PDA and RPL branches.     Right common femoral arteriography shows no significant stenoses involving the right common femoral artery. A sheath is seen inserting into the right common femoral artery superior to the femoral  bifurcation, but inferior to the pelvic rim.     CONCLUSIONS:  Coronary artery disease as described above.  Successful IVUS guided PCI of the circumflex with a Ramin frontier 2.75 mm x 15 mm ABIMBOLA postdilated to 3.0 mm distally and 3.5 mm proximally.  Conversion of stenosis from 80% to 0%.  DORIS-3 flow pre and postintervention.            Emerson Julio MD

## 2024-08-09 NOTE — H&P
[facility]    PATIENT'S NAME: MELISSA ISRAEL   ATTENDING PHYSICIAN: Amber Tanner MD   PATIENT ACCOUNT#:   [id number]    LOCATION:  [place] [address]  MEDICAL RECORD #:   [id number]       YOB: 1945  ADMISSION DATE:       08/09/2024    HISTORY AND PHYSICAL EXAMINATION    CHIEF COMPLAINT:  Gastrointestinal bleed and posthemorrhagic anemia.    HISTORY OF PRESENT ILLNESS:  Patient is a 79-year-old  male with history of 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  Presented today to the emergency department for evaluation of maroonish-colored bowel movements for the last 3 to 4 days with generalized weakness and low blood pressure.  He finished dialysis and noted to have systolic blood pressure of 70.  In the emergency room, blood pressure 102/41.  CBC showed hemoglobin of 5.3, which has dropped from baseline.  INR 1.45.  Chemistry still pending.  Patient was initiated on blood transfusion.  CT scan of the abdomen and pelvis still pending.      PAST MEDICAL HISTORY:  Recurrent gastrointestinal bleeds from ascending colon AVMs, required clip.  He was hospitalized earlier this month with a gastrointestinal bleed.  Repeat colonoscopy showed no evidence of acute extravasation.  Patient has a history of hypertension; diabetes mellitus type 2; asthma; obstructive sleep apnea; end-stage renal disease, on hemodialysis; hypertension; hyperlipidemia; benign prostatic hypertrophy; peripheral neuropathy; coronary artery disease, status post PCI left circumflex; paroxysmal atrial fibrillation, anticoagulated with Eliquis.    PAST SURGICAL HISTORY:  Appendectomy, lumbar laminectomy, and cataract procedure, right upper extremity AV fistula.    MEDICATIONS:  Please see medication reconciliation list.  Noted to be on aspirin and Eliquis.    FAMILY HISTORY:  Mother had breast cancer and diabetes mellitus type 2.  Father had heart disease.    SOCIAL  HISTORY:  Ex-tobacco user.  No current tobacco, alcohol, or drug use.  Lives with his family.  Independent in his basic activities of daily living.     REVIEW OF SYSTEMS:  Patient said 4 days ago he had a small cut in his tongue which bled a little bit and then a day after started having maroonish bowel movements on a daily basis, progressive weakness, fatigue.  No abdominal pain.  No nausea or vomiting.  He felt lightheaded when he stood up.  Other 12-point review of systems is negative.       PHYSICAL EXAMINATION:    GENERAL:  Pale in color.  Alert and oriented to time, place and person.  Fatigued.   VITAL SIGNS:  Temperature 97.1, pulse 70, respiratory rate 21, blood pressure 102/41, pulse ox 100% on room air.  HEENT:  Atraumatic.  Oropharynx clear.  Dry mucous membranes.  Ears and nose normal.  Eyes:  Anicteric sclerae.    NECK:  Supple.  No lymphadenopathy.  Trachea midline.  Full range of motion.   LUNGS:  Clear to auscultation bilaterally.  Normal respiratory effort.    HEART:  Regular rate and rhythm.  S1 and S2 auscultated.  No murmur.    ABDOMEN:  Soft, nondistended.  No tenderness.  Positive bowel sounds.   EXTREMITIES:  No peripheral edema, clubbing or cyanosis.  Right arm AV fistula with good thrill.  NEUROLOGIC:  Motor and sensory intact.      ASSESSMENT:    1.   Recurrent gastrointestinal bleed.  Possible arteriovenous malformations, ascending colon, versus small bowel arteriovenous malformations.  2.   Posthemorrhagic anemia.  3.   End-stage renal disease, on hemodialysis.   4.   Hypotension and hypovolemia.  5.   Diabetes mellitus type 2.    PLAN:  Patient will be admitted to telemetry floor.  Monitor hemodynamic status.  Blood transfusion.  Gastroenterology, cardiology, and nephrology consults.  Hold aspirin and Plavix.  Hold blood pressure medications.  Further recommendations to follow.     Dictated By Amilcar Amaro MD  d: 08/09/2024 15:26:45  t: 08/09/2024  17:29:57  [facility] [id number]  /

## 2024-08-09 NOTE — ED QUICK NOTES
Pt placed on 1-2L NC for comfort  Pt wheezing upon arrival to ED, reports this is common at home  MD notified - Chuy ordered

## 2024-08-09 NOTE — CONSULTS
[facility]  part of [facility]    Report of Consultation    Ollie Hernández Patient Status:  Emergency    1945 MRN [id number]   Location [facility] EMERGENCY DEPARTMENT Attending Amber Tanner MD   Hosp Day # 0 PCP Wili Parmar MD     Date of Admission:  2024  Date of Consult:  24  Reason for Consultation:   Hematochezia    History of Present Illness:   Patient is a 79 year old male with a history of ESRD on HD, KRAIG, HTN, HLD,  Afib on Eliquis, Colon AVM 2024 s/p APC who presents with recurrent rectal bleeding.    Ollie was admitted 2024 and again 2 weeks ago with similar presentation of maroon colored stools.  EGD/Colonoscopy 2024 with unremarkable EGD, Colonoscopy with bleeding AVM vs Dieulafoy in the Hepatic Flexure s/p APC.  Had repeat EGD/Colonoscopy 24 with again normal EGD and Colonoscopy with evidence of the previously placed clip in the hepatic flexure region.  Mucosa that it was attached to was erythematous, however no bleeding was noted.  This area was APC'd.  Discussed at that time that patient would be considered for Watchman given recurrent bleeding.  Last video capsule endoscopy  for evaluation of CHELSEY.  Presents with Hgb 5.3 and on last discharge 10.5.  CT Angio negative for active bleeding.  INR 1.45.   BUN 33 and Cr 3.16.        Past Medical History  Past Medical History:    Anemia    Asthma (HCC)    Back problem    BPH (benign prostatic hyperplasia)    Calculus of kidney    Cataract    Coronary atherosclerosis    Diabetes (HCC)    ESRD (end stage renal disease) on dialysis (HCC)    Essential hypertension    High blood pressure    High cholesterol    History of blood transfusion    Hyperlipidemia    Neuropathy    hands and feet    KRAIG on CPAP    Renal disorder    Sleep apnea    Visual impairment    glasses    Vocal cord paralysis, unilateral partial       Past Surgical History  Past Surgical History:   Procedure Laterality Date     Appendectomy          Back surgery      Neck/back -     Capsule endoscopy - internal referral      Cataract      2021 and 2022    Cath bare metal stent (bms)      Colonoscopy      Colonoscopy N/A 2021    Procedure: COLONOSCOPY;  Surgeon: Michael Gautam MD;  Location: [facility] ENDO    Colonoscopy N/A 2024    Procedure: COLONOSCOPY;  Surgeon: Gabriel Saldana MD;  Location: [facility] ENDOSCOPY    Colonoscopy N/A 06/15/2024    Procedure: COLONOSCOPY;  Surgeon: Carlyn Storey MD;  Location: [facility] ENDOSCOPY    Colonoscopy N/A 2024    Procedure: COLONOSCOPY;  Surgeon: Gabriel Saldana MD;  Location: [facility] ENDOSCOPY    Hand/finger surgery unlisted      Accidental trauma    Spine surgery procedure unlisted      Upper gi endoscopy,diagnosis         Family History  Family History   Problem Relation Age of Onset    Cancer Father         Kidney    Breast Cancer Mother     Diabetes Mother     Diabetes Maternal Grandmother     Diabetes Maternal Grandfather     Heart Disorder Other         Uncle       Social History  Social History     Socioeconomic History    Marital status:    Tobacco Use    Smoking status: Former     Current packs/day: 0.00     Average packs/day: 1 pack/day for 17.0 years (17.0 ttl pk-yrs)     Types: Cigarettes     Start date: 1964     Quit date: 1981     Years since quittin.6    Smokeless tobacco: Never   Vaping Use    Vaping status: Never Used   Substance and Sexual Activity    Alcohol use: No     Alcohol/week: 0.0 standard drinks of alcohol    Drug use: No    Sexual activity: Yes     Partners: Female     Social Determinants of Health     Financial Resource Strain: Low Risk  (2024)    Financial Resource Strain     Difficulty of Paying Living Expenses: Not hard at all     Med Affordability: No   Food Insecurity: No Food Insecurity (2024)    Food Insecurity     Food Insecurity: Never true   Transportation Needs: No Transportation Needs (2024)    Transportation  Needs     Lack of Transportation: No   Housing Stability: Low Risk  (7/30/2024)    Housing Stability     Housing Instability: No          Current Medications:  Current Facility-Administered Medications   Medication Dose Route Frequency    ipratropium-albuterol (Duoneb) 0.5-2.5 (3) MG/3ML inhalation solution 3 mL  3 mL Nebulization Once     (Not in a hospital admission)      Allergies  Allergies   Allergen Reactions    Adhesive Tape OTHER (SEE COMMENTS)     Severe rashes    Dust Mite Extract RASH       Review of Systems:    Pertinent items are noted in HPI.    Physical Exam:   Blood pressure 108/55, pulse 70, temperature 97.1 °F (36.2 °C), temperature source Temporal, resp. rate 19, height 162.6 cm (5' 4\"), weight 154 lb (69.9 kg), SpO2 100%.    GENERAL: stable  SKIN: no rashes, no suspicious lesions  HEENT: atraumatic, normocephalic  CHEST/CARDIO: RRR without murmur  LUNGS: clear to auscultation  GI: good BS's and no masses, HSM or tenderness  EXTREMITIES: no edema      Results:     Laboratory Data:  Lab Results   Component Value Date    WBC 8.2 08/09/2024    HGB 5.3 (LL) 08/09/2024    HCT 16.2 (L) 08/09/2024    .0 08/09/2024    CREATSERUM 3.16 (H) 08/09/2024    BUN 33 (H) 08/09/2024     08/09/2024    K 3.9 08/09/2024     08/09/2024    CO2 33.0 (H) 08/09/2024     (H) 08/09/2024    CA 8.7 08/09/2024    ALB 3.2 08/09/2024    ALKPHO 43 (L) 08/09/2024    TP 5.6 (L) 08/09/2024    AST 17 08/09/2024    ALT 16 08/09/2024    PTT 30.1 08/09/2024    INR 1.45 (H) 08/09/2024    PTP 18.5 (H) 08/09/2024    T4F 0.9 08/31/2022    TSH 2.844 07/04/2024     (H) 07/30/2024    PSA 2.94 10/20/2021    ESRML 10 06/16/2024    CRP 1.30 (H) 06/16/2024    MG 1.7 08/01/2024    PHOS 5.0 08/01/2024    TROP <0.045 07/25/2019    TROPHS 25 04/25/2022     08/05/2023    B12 672 07/04/2024         Imaging:  CTA ABD/PEL (CPT=74174)    Result Date: 8/9/2024  CONCLUSION:   No active contrast extravasation.  No  intra-abdominal or intrapelvic hematoma.  Nondilated fluid-filled loops of small bowel are nonspecific but can be seen with enteritis. Liquid stool seen throughout the colon suggestive of diarrhea.  Overall constellation of findings are suggestive of a mild diffuse enterocolitis, likely from infectious or inflammatory etiology.  No obstruction.   Peripheral interstitial and reticular opacities within the bilateral lower lungs is partially seen suggestive of developing fibrotic changes or interstitial lung disease.  No honeycombing is seen within the visualized lower lungs.   Multiple other incidental findings as described in the body of the report which are unchanged.  elm-remote    Dictated by (CST): Jeff Novak MD on 8/09/2024 at 3:15 PM     Finalized by (CST): Jeff Novak MD on 8/09/2024 at 3:25 PM              Impression:   79 year old male with a history of ESRD on HD, KRAIG, HTN, HLD,  Afib on Eliquis, 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    Rectal Bleeding;  Hx Colon AVM:  Afib on Eliquis:  - Patient presents back with similar to prior admissions with rectal bleeding of maroon colored stools, lack of abdominal pain, nausea, emesis, weight loss.  Admitted 6/2024 and again 2 weeks ago.  EGD/Colonoscopy 6/2024 with unremarkable EGD, Colonoscopy with bleeding AVM vs Dieulafoy in the Hepatic Flexure s/p APC.  Had repeat EGD/Colonoscopy 7/31/24 with again normal EGD and Colonoscopy with evidence of the previously placed clip in the hepatic flexure region.  Mucosa that it was attached to was erythematous, however no bleeding was noted.    - We discussed options of repeat Colonoscopy given known bleed in the hepatic flexure but low yield given repeat Colonoscopy and further treatment.  Patient and wife also defer repeat Colonoscopy.  EGD not recommended given negative exam x 2.    Plan:  - Will proceed with VCE tomorrow to evaluate for small bowel etiology of anemia  - Ideally needs  to be considered for Watchman Procedure and eventual discontinuation of anticoagulation   - Transfuse to goal Hgb > 8.0 given cardiac history  - Mag-Citrate Ordered for bowel prep for capsule endoscopy  - NPO after MN    Thank you for allowing me to participate in the care of your patient.    Corky Bustamante MD  8/9/2024

## 2024-08-09 NOTE — ED QUICK NOTES
Pt back on room air, reports improved work of breathing  Pt used bed pan and was cleaned up, bright red stool present  Pts family at bedside  Report called to ROSALINO Frost

## 2024-08-09 NOTE — ED INITIAL ASSESSMENT (HPI)
Patient arrives with reports of rectal bleeding that has been ongoing. Reports being admitted recently for the same complaint.   MWF HD, R fistula.   Denies SOB. States BP dropped in HD today, 2/3 hours completed.

## 2024-08-10 PROBLEM — I48.0 PAF (PAROXYSMAL ATRIAL FIBRILLATION) (HCC): Status: ACTIVE | Noted: 2024-01-01

## 2024-08-10 PROBLEM — D62 ABLA (ACUTE BLOOD LOSS ANEMIA): Status: ACTIVE | Noted: 2024-01-01

## 2024-08-10 PROBLEM — D62 ABLA (ACUTE BLOOD LOSS ANEMIA): Status: ACTIVE | Noted: 2024-06-05

## 2024-08-10 PROBLEM — I48.0 PAF (PAROXYSMAL ATRIAL FIBRILLATION) (HCC): Status: ACTIVE | Noted: 2024-06-05

## 2024-08-10 LAB
ANION GAP SERPL CALC-SCNC: 5 MMOL/L (ref 0–18)
BASOPHILS # BLD AUTO: 0.05 X10(3) UL (ref 0–0.2)
BASOPHILS NFR BLD AUTO: 0.6 %
BUN BLD-MCNC: 37 MG/DL (ref 9–23)
BUN/CREAT SERPL: 9.3 (ref 10–20)
CALCIUM BLD-MCNC: 9.3 MG/DL (ref 8.7–10.4)
CHLORIDE SERPL-SCNC: 102 MMOL/L (ref 98–112)
CO2 SERPL-SCNC: 32 MMOL/L (ref 21–32)
CREAT BLD-MCNC: 3.99 MG/DL
DEPRECATED RDW RBC AUTO: 51 FL (ref 35.1–46.3)
EGFRCR SERPLBLD CKD-EPI 2021: 15 ML/MIN/1.73M2 (ref 60–?)
EOSINOPHIL # BLD AUTO: 0.17 X10(3) UL (ref 0–0.7)
EOSINOPHIL NFR BLD AUTO: 2.1 %
ERYTHROCYTE [DISTWIDTH] IN BLOOD BY AUTOMATED COUNT: 15.4 % (ref 11–15)
GLUCOSE BLD-MCNC: 99 MG/DL (ref 70–99)
GLUCOSE BLDC GLUCOMTR-MCNC: 119 MG/DL (ref 70–99)
GLUCOSE BLDC GLUCOMTR-MCNC: 123 MG/DL (ref 70–99)
GLUCOSE BLDC GLUCOMTR-MCNC: 156 MG/DL (ref 70–99)
GLUCOSE BLDC GLUCOMTR-MCNC: 274 MG/DL (ref 70–99)
GLUCOSE BLDC GLUCOMTR-MCNC: 97 MG/DL (ref 70–99)
HCT VFR BLD AUTO: 24.2 %
HCT VFR BLD AUTO: 25.5 %
HGB BLD-MCNC: 8 G/DL
HGB BLD-MCNC: 8.5 G/DL
IMM GRANULOCYTES # BLD AUTO: 0.04 X10(3) UL (ref 0–1)
IMM GRANULOCYTES NFR BLD: 0.5 %
LYMPHOCYTES # BLD AUTO: 1.61 X10(3) UL (ref 1–4)
LYMPHOCYTES NFR BLD AUTO: 20.1 %
MCH RBC QN AUTO: 30.5 PG (ref 26–34)
MCHC RBC AUTO-ENTMCNC: 33.3 G/DL (ref 31–37)
MCV RBC AUTO: 91.4 FL
MONOCYTES # BLD AUTO: 0.6 X10(3) UL (ref 0.1–1)
MONOCYTES NFR BLD AUTO: 7.5 %
NEUTROPHILS # BLD AUTO: 5.55 X10 (3) UL (ref 1.5–7.7)
NEUTROPHILS # BLD AUTO: 5.55 X10(3) UL (ref 1.5–7.7)
NEUTROPHILS NFR BLD AUTO: 69.2 %
OSMOLALITY SERPL CALC.SUM OF ELEC: 297 MOSM/KG (ref 275–295)
PLATELET # BLD AUTO: 147 10(3)UL (ref 150–450)
POTASSIUM SERPL-SCNC: 4.5 MMOL/L (ref 3.5–5.1)
RBC # BLD AUTO: 2.79 X10(6)UL
SODIUM SERPL-SCNC: 139 MMOL/L (ref 136–145)
WBC # BLD AUTO: 8 X10(3) UL (ref 4–11)

## 2024-08-10 PROCEDURE — 99222 1ST HOSP IP/OBS MODERATE 55: CPT | Performed by: INTERNAL MEDICINE

## 2024-08-10 PROCEDURE — 99233 SBSQ HOSP IP/OBS HIGH 50: CPT | Performed by: HOSPITALIST

## 2024-08-10 PROCEDURE — 0DJ07ZZ INSPECTION OF UPPER INTESTINAL TRACT, VIA NATURAL OR ARTIFICIAL OPENING: ICD-10-PCS | Performed by: INTERNAL MEDICINE

## 2024-08-10 RX ORDER — ESCITALOPRAM OXALATE 5 MG/1
5 TABLET ORAL DAILY
Status: DISCONTINUED | OUTPATIENT
Start: 2024-08-10 | End: 2024-08-13

## 2024-08-10 RX ORDER — FLUTICASONE PROPIONATE AND SALMETEROL 250; 50 UG/1; UG/1
1 POWDER RESPIRATORY (INHALATION) 2 TIMES DAILY
Status: DISCONTINUED | OUTPATIENT
Start: 2024-08-10 | End: 2024-08-13

## 2024-08-10 RX ORDER — DIAPER,BRIEF,INFANT-TODD,DISP
EACH MISCELLANEOUS 3 TIMES DAILY
Status: DISCONTINUED | OUTPATIENT
Start: 2024-08-10 | End: 2024-08-13

## 2024-08-10 RX ORDER — IPRATROPIUM BROMIDE AND ALBUTEROL SULFATE 2.5; .5 MG/3ML; MG/3ML
3 SOLUTION RESPIRATORY (INHALATION) EVERY 4 HOURS PRN
Status: DISCONTINUED | OUTPATIENT
Start: 2024-08-10 | End: 2024-08-13

## 2024-08-10 RX ORDER — BUMETANIDE 0.25 MG/ML
2 INJECTION INTRAMUSCULAR; INTRAVENOUS DAILY
Status: DISCONTINUED | OUTPATIENT
Start: 2024-08-10 | End: 2024-08-13

## 2024-08-10 RX ORDER — ZOLPIDEM TARTRATE 5 MG/1
5 TABLET ORAL NIGHTLY PRN
Status: DISCONTINUED | OUTPATIENT
Start: 2024-08-10 | End: 2024-08-11

## 2024-08-10 RX ORDER — METHOCARBAMOL 500 MG/1
500 TABLET, FILM COATED ORAL 2 TIMES DAILY PRN
Status: DISCONTINUED | OUTPATIENT
Start: 2024-08-10 | End: 2024-08-13

## 2024-08-10 RX ORDER — AMLODIPINE BESYLATE 5 MG/1
5 TABLET ORAL DAILY
Status: DISCONTINUED | OUTPATIENT
Start: 2024-08-10 | End: 2024-08-13

## 2024-08-10 RX ORDER — PANTOPRAZOLE SODIUM 40 MG/1
40 TABLET, DELAYED RELEASE ORAL
Status: DISCONTINUED | OUTPATIENT
Start: 2024-08-10 | End: 2024-08-13

## 2024-08-10 RX ORDER — CARVEDILOL 25 MG/1
25 TABLET ORAL 2 TIMES DAILY
Status: DISCONTINUED | OUTPATIENT
Start: 2024-08-10 | End: 2024-08-13

## 2024-08-10 RX ORDER — TRAMADOL HYDROCHLORIDE 50 MG/1
50 TABLET ORAL EVERY 12 HOURS PRN
Status: DISCONTINUED | OUTPATIENT
Start: 2024-08-10 | End: 2024-08-13

## 2024-08-10 RX ORDER — ALBUTEROL SULFATE 90 UG/1
2 AEROSOL, METERED RESPIRATORY (INHALATION) EVERY 4 HOURS PRN
Status: DISCONTINUED | OUTPATIENT
Start: 2024-08-10 | End: 2024-08-13

## 2024-08-10 RX ORDER — HYDRALAZINE HYDROCHLORIDE 20 MG/ML
5 INJECTION INTRAMUSCULAR; INTRAVENOUS EVERY 4 HOURS PRN
Status: DISCONTINUED | OUTPATIENT
Start: 2024-08-10 | End: 2024-08-13

## 2024-08-10 NOTE — CONSULTS
[facility]  part of [facility]    Nephrology Consult Note    Date of Admission:  8/9/2024  Date of Consult:  8/10/2024   Reason for Consultation:   ESRD on HD    History of Present Illness:   Patient is a 79 year old male with past medical history of T2DM, HTN, HLD, ESRD on HD MWF, CAD s/p PCI (5/2024), A.fib on Eliquis, HFpEF, KRAIG, BPH, history of recurrent gastrointestinal bleeding, who presented with BRBPR for the last 3-4 days. He had 3 episodes yesterday and vomiting so he decided to come to ED.   He is found to have Hb of 5.3. He received 3 units of pRBCs. Hb improved to 8.5 today. He is still have BM with maroon stools.     He had 2 hours of HD on Monday and BP dropped to 70s toward the end.     Past Medical History  Past Medical History:    Anemia    Asthma (HCC)    Back problem    BPH (benign prostatic hyperplasia)    Calculus of kidney    Cataract    Coronary atherosclerosis    Diabetes (HCC)    ESRD (end stage renal disease) on dialysis (HCC)    Essential hypertension    High blood pressure    High cholesterol    History of blood transfusion    Hyperlipidemia    Neuropathy    hands and feet    KRAIG on CPAP    Renal disorder    Sleep apnea    Visual impairment    glasses    Vocal cord paralysis, unilateral partial       Past Surgical History  Past Surgical History:   Procedure Laterality Date    Appendectomy      1981    Back surgery      Neck/back - 1998    Capsule endoscopy - internal referral      Cataract      12/2021 and 1/2022    Cath bare metal stent (bms)      Colonoscopy      Colonoscopy N/A 01/25/2021    Procedure: COLONOSCOPY;  Surgeon: Michael Gautam MD;  Location: [facility] ENDO    Colonoscopy N/A 06/03/2024    Procedure: COLONOSCOPY;  Surgeon: Gabriel Saldana MD;  Location: [facility] ENDOSCOPY    Colonoscopy N/A 06/15/2024    Procedure: COLONOSCOPY;  Surgeon: Carlyn Storey MD;  Location: [facility] ENDOSCOPY    Colonoscopy N/A 7/31/2024    Procedure: COLONOSCOPY;  Surgeon: Gabriel Saldana  MD JED;  Location: [facility] ENDOSCOPY    Hand/finger surgery unlisted      Accidental trauma    Spine surgery procedure unlisted      Upper gi endoscopy,diagnosis         Family History  Family History   Problem Relation Age of Onset    Cancer Father         Kidney    Breast Cancer Mother     Diabetes Mother     Diabetes Maternal Grandmother     Diabetes Maternal Grandfather     Heart Disorder Other         Uncle       Social History  Pediatric History   Patient Parents    Not on file     Other Topics Concern    Not on file   Social History Narrative    Not on file           Current Medications:  Current Facility-Administered Medications   Medication Dose Route Frequency    hydrALAzine (Apresoline) 20 mg/mL injection 5 mg  5 mg Intravenous Q4H PRN    ipratropium-albuterol (Duoneb) 0.5-2.5 (3) MG/3ML inhalation solution 3 mL  3 mL Nebulization Q4H PRN    albuterol (Ventolin HFA) 108 (90 Base) MCG/ACT inhaler 2 puff  2 puff Inhalation Q4H PRN    acetaminophen (Tylenol Extra Strength) tab 500 mg  500 mg Oral Q4H PRN    ondansetron (Zofran) 4 MG/2ML injection 4 mg  4 mg Intravenous Q6H PRN    glucose (Dex4) 15 GM/59ML oral liquid 15 g  15 g Oral Q15 Min PRN    Or    glucose (Glutose) 40% oral gel 15 g  15 g Oral Q15 Min PRN    Or    glucose-vitamin C (Dex-4) chewable tab 4 tablet  4 tablet Oral Q15 Min PRN    Or    dextrose 50% injection 50 mL  50 mL Intravenous Q15 Min PRN    Or    glucose (Dex4) 15 GM/59ML oral liquid 30 g  30 g Oral Q15 Min PRN    Or    glucose (Glutose) 40% oral gel 30 g  30 g Oral Q15 Min PRN    Or    glucose-vitamin C (Dex-4) chewable tab 8 tablet  8 tablet Oral Q15 Min PRN    insulin aspart (NovoLOG) 100 Units/mL FlexPen 1-5 Units  1-5 Units Subcutaneous TID CC    metoclopramide (Reglan) 5 mg/mL injection 5 mg  5 mg Intravenous Q8H PRN       Allergies  Allergies   Allergen Reactions    Adhesive Tape OTHER (SEE COMMENTS)     Severe rashes    Dust Mite Extract RASH       Review of Systems:   Review of  Systems   Constitutional:  Positive for fatigue. Negative for appetite change, chills and fever.   HENT:  Negative for ear pain, rhinorrhea, sore throat and trouble swallowing.    Eyes:  Negative for pain and discharge.   Respiratory:  Negative for cough, chest tightness and shortness of breath.    Cardiovascular:  Negative for chest pain and leg swelling.   Gastrointestinal:  Negative for abdominal pain, constipation, diarrhea and vomiting.   Genitourinary:  Negative for decreased urine volume, dysuria and flank pain.   Musculoskeletal:  Negative for arthralgias, back pain, gait problem and myalgias.   Skin:  Negative for rash.   Neurological:  Negative for dizziness, speech difficulty, weakness, numbness and headaches.   Psychiatric/Behavioral:  Negative for agitation and confusion.        Physical Exam:   Height: 5' 4\" (162.6 cm) (08/09 1345)  Weight: 158 lb 4.6 oz (71.8 kg) (08/09 1844)  BSA (Calculated - sq m): 1.75 sq meters (08/09 1345)  Pulse: 105 (08/10 0807)  BP: 158/83 (08/10 0807)  Temp: 97.4 °F (36.3 °C) (08/10 0500)  Do Not Use - Resp Rate: --  SpO2: 96 % (08/10 0807)  Temp:  [96.9 °F (36.1 °C)-98.5 °F (36.9 °C)] 97.4 °F (36.3 °C)  Pulse:  [] 105  Resp:  [11-22] 14  BP: ()/(33-83) 158/83  SpO2:  [93 %-100 %] 96 %  SpO2: 96 %     Intake/Output Summary (Last 24 hours) at 8/10/2024 0905  Last data filed at 8/10/2024 0600  Gross per 24 hour   Intake 1816.67 ml   Output 575 ml   Net 1241.67 ml     Wt Readings from Last 5 Encounters:   08/09/24 158 lb 4.6 oz (71.8 kg)   08/06/24 156 lb (70.8 kg)   08/01/24 146 lb 8 oz (66.5 kg)   06/27/24 163 lb (73.9 kg)   06/13/24 156 lb 8 oz (71 kg)       Physical Exam  Constitutional:       Appearance: Normal appearance.   HENT:      Head: Normocephalic and atraumatic.      Nose: Nose normal.   Eyes:      General: No scleral icterus.  Cardiovascular:      Rate and Rhythm: Normal rate and regular rhythm.      Heart sounds: Normal heart sounds. No murmur  heard.     Comments: Right AVF-pos bruit/thrill  Pulmonary:      Effort: Pulmonary effort is normal.      Breath sounds: Normal breath sounds.   Abdominal:      General: Bowel sounds are normal. There is no distension.      Palpations: Abdomen is soft.   Musculoskeletal:         General: No tenderness. Normal range of motion.      Cervical back: Normal range of motion and neck supple.      Comments: Neg edema   Skin:     General: Skin is warm and dry.      Findings: No rash.   Neurological:      General: No focal deficit present.      Mental Status: He is alert and oriented to person, place, and time. Mental status is at baseline.   Psychiatric:         Mood and Affect: Mood normal.         Behavior: Behavior normal.         Thought Content: Thought content normal.         Results:     Laboratory Data:  Recent Labs   Lab 08/09/24  1441 08/09/24  1951 08/10/24  0103 08/10/24  0354   RBC 1.73*  --   --  2.79*   HGB 5.3* 6.7* 8.0* 8.5*   HCT 16.2* 20.5* 24.2* 25.5*   MCV 93.6  --   --  91.4   NEPRELIM 6.29  --   --  5.55   WBC 8.2  --   --  8.0   .0  --   --  147.0*     Recent Labs   Lab 08/09/24  1441 08/10/24  0354   * 99   BUN 33* 37*   CREATSERUM 3.16* 3.99*   CA 8.7 9.3   ALB 3.2  --     139   K 3.9 4.5    102   CO2 33.0* 32.0   ALKPHO 43*  --    AST 17  --    ALT 16  --    BILT 0.3  --    TP 5.6*  --      PTT   Date Value Ref Range Status   08/09/2024 30.1 23.0 - 36.0 seconds Final     INR   Date Value Ref Range Status   08/09/2024 1.45 (H) 0.80 - 1.20 Final     Comment:     Therapeutic INR range for patients on warfarin:  2.0-3.0 for most medical conditions and surgical prophylaxis   2.5-3.5 for mechanical heart valves and recurrent thromboembolism    Direct thrombin inhibitors (e.g. argatroban, bivalirudin), factor Xa inhibitors (e.g. apixaban, rivaroxaban), and conditions such as coagulation factor deficiency, vitamin K deficiency, and liver disease will prolong the prothrombin  time/INR.    Unfractionated heparin and low molecular weight heparin do not affect the prothrombin time/INR up to a concentration of 1 IU/mL and 1.5 IU/mL, respectively.         No results for input(s): \"BNP\" in the last 168 hours.       Imaging:  CTA ABD/PEL (CPT=74174)    Result Date: 8/9/2024  CONCLUSION:   No active contrast extravasation.  No intra-abdominal or intrapelvic hematoma.  Nondilated fluid-filled loops of small bowel are nonspecific but can be seen with enteritis. Liquid stool seen throughout the colon suggestive of diarrhea.  Overall constellation of findings are suggestive of a mild diffuse enterocolitis, likely from infectious or inflammatory etiology.  No obstruction.   Peripheral interstitial and reticular opacities within the bilateral lower lungs is partially seen suggestive of developing fibrotic changes or interstitial lung disease.  No honeycombing is seen within the visualized lower lungs.   Multiple other incidental findings as described in the body of the report which are unchanged.  elm-remote    Dictated by (CST): Jeff Novak MD on 8/09/2024 at 3:15 PM     Finalized by (CST): Jeff Novak MD on 8/09/2024 at 3:25 PM              Impression and Recommendations:     79 year old male with past medical history of T2DM, HTN, HLD, ESRD on HD MWF, CAD s/p PCI (5/2024), A.fib on Eliquis, HFpEF, KRAIG, BPH, history of recurrent gastrointestinal bleeding, who presented with BRBPR for the last 3-4 days.     GIB:   Hb 5.3 on admission. He received 3 units of pRBCs. Hb improved to 8.5.  Epogen 10,000 units 3x/week ordered.   GI following.     ESRD HD MWF:   HD yesterday. will monitor for the need of additional HD.  He makes urine. Will start Bumex 2 mg IV daily     CAD/A.fib:   ASA and Eliquis on hold.   Cards following.      Thank you for allowing me to participate in the care of your patient.      Mirta Redman MD  Staff Nephrologist  8/10/2024

## 2024-08-10 NOTE — PROGRESS NOTES
-- DO NOT REPLY / DO NOT REPLY ALL --  -- Message is from Engagement Center Operations (EC[employer]) --    General Patient Message     Patient is returning a missed call. Please contact again.      Caller Information       Type Contact Phone/Fax    09/27/2022 10:54 AM CDT Phone (Incoming) Mary Hatch (Self) [phone] (M)        Alternative phone number: na     Can a detailed message be left? Yes    Message Turnaround:     Is it Working Hours? Yes - Working Hours     [place]:    Please give this turnaround time to the caller:   \"This message will be sent to [state Provider's name]. The clinical team will fulfill your request as soon as they review your message.\"                 [facility]  part of [facility]    Progress Note    Ollie Hernández Patient Status:  Inpatient    1945 MRN [id number]   Location [facility] 2W/SW Attending Denis Julian MD   Hosp Day # 1 PCP Wili Parmar MD     Subjective:   Smears of bloody stool overnight.  Swallowed capsule this AM.  Laying in bed and has not been up since swallowing.    Objective:   Blood pressure 153/60, pulse 74, temperature 97.4 °F (36.3 °C), temperature source Temporal, resp. rate 22, height 162.6 cm (5' 4\"), weight 158 lb 4.6 oz (71.8 kg), SpO2 98%.    GENERAL: in no apparent distress  CHEST/CARDIO: RRR without murmur  LUNGS: clear to auscultation  GI: good BS's and no tenderness      Results:   Lab Results   Component Value Date    WBC 8.0 08/10/2024    HGB 8.5 (L) 08/10/2024    HCT 25.5 (L) 08/10/2024    .0 (L) 08/10/2024    CREATSERUM 3.99 (H) 08/10/2024    BUN 37 (H) 08/10/2024     08/10/2024    K 4.5 08/10/2024     08/10/2024    CO2 32.0 08/10/2024    GLU 99 08/10/2024    CA 9.3 08/10/2024    ALB 3.2 2024    ALKPHO 43 (L) 2024    BILT 0.3 2024    TP 5.6 (L) 2024    AST 17 2024    ALT 16 2024    PTT 30.1 2024    INR 1.45 (H) 2024    T4F 0.9 2022    TSH 2.844 2024     (H) 2024    PSA 2.94 10/20/2021    ESRML 10 2024    CRP 1.30 (H) 2024    MG 1.7 2024    PHOS 5.0 2024    TROPHS 25 2022     2023    B12 672 2024       CTA ABD/PEL (CPT=74174)    Result Date: 2024  CONCLUSION:   No active contrast extravasation.  No intra-abdominal or intrapelvic hematoma.  Nondilated fluid-filled loops of small bowel are nonspecific but can be seen with enteritis. Liquid stool seen throughout the colon suggestive of diarrhea.  Overall constellation of findings are suggestive of a mild diffuse enterocolitis, likely from infectious or inflammatory etiology.  No obstruction.    Peripheral interstitial and reticular opacities within the bilateral lower lungs is partially seen suggestive of developing fibrotic changes or interstitial lung disease.  No honeycombing is seen within the visualized lower lungs.   Multiple other incidental findings as described in the body of the report which are unchanged.  elm-remote    Dictated by (CST): Jeff Novak MD on 8/09/2024 at 3:15 PM     Finalized by (CST): Jeff Novak MD on 8/09/2024 at 3:25 PM         EKG    Result Date: 8/9/2024  Normal sinus rhythm Normal ECG When compared with ECG of 30-JUL-2024 04:27, Premature ventricular complexes are no longer Present Vent. 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     Rectal Bleeding;  Hx Colon AVM:  Afib on Eliquis:  - Patient presents back with similar to prior admissions with rectal bleeding of maroon colored stools, lack of abdominal pain, nausea, emesis, weight loss.  Admitted 6/2024 and again 2 weeks ago.  EGD/Colonoscopy 6/2024 with unremarkable EGD, Colonoscopy with bleeding AVM vs Dieulafoy in the Hepatic Flexure s/p APC.  Had repeat EGD/Colonoscopy 7/31/24 with again normal EGD and Colonoscopy with evidence of the previously placed clip in the hepatic flexure region.  Mucosa that it was attached to was erythematous, however no bleeding was noted.      - We discussed options of repeat Colonoscopy given known bleed in the hepatic flexure but low yield given repeat Colonoscopy and further treatment.  Patient and wife also defer repeat Colonoscopy.  EGD not recommended given negative exam x 2.  - Video Capsule Endoscopy swallowed today     Plan:  - Instructed to ambulate as tolerates and sit up in chair to allow passage of video capsule throughout the small bowel.  Will upload and  review study tomorrow.  - Ideally needs to be considered for Watchman Procedure and eventual discontinuation of anticoagulation. Following with Dr. Smith and plan for CTA DEANA and follow up with Structural NP this month.  - Transfuse to goal Hgb > 8.0 given cardiac history  - Monitor for overt bleeding      Corky Bustamante M.D.  [facility]  Gastroenterology & Hepatology

## 2024-08-10 NOTE — PROGRESS NOTES
[facility] CARDIOLOGY PROGRESS NOTE      ASSESSMENT/PLAN:     IMPRESSIONS:  Recurrent GI bleeding with Hgb 5.3 on presentation  PAF, currently SR  CAD s/p [place] circ 5/21/24  Chronic diastolic CHF  ESRD on HD  HTN  HL, on statin        PLAN:  Continue to hold antiplatelets and apixaban for today with severe anemia s/p 4u pRBCs. He understands the risks of stent thrombosis and stroke while off these agents but given recent significant bleeding/anemia must hold for now. Ideally restart antiplatelet in the near future pending clinical course.   Saw Dr Smith 8/8/24 for watchman eval. To get CTA DEANA 8/22/24 with return visit to see structural NP on 8/27/24  Appreciate ongoing GI evaluation.   Cardiology to follow.     Patient Dr Phelan      Subjective:    No acute events. Feels better than yesterday. Denies CP/SOB.           HPI  History PAF, CAD s/p PCI circ 5/24 has had multiple admissions for GI bleed. Last was 7/30/24. Had EGD/colonoscopy then: not revealing. Eliquis and ASA resumed a week ago. Saw Dr Smith for LAAO device eval yesterday. CTA DEANA scheduled in 2 weeks.     Now with dyspnea and BRBPR. Emesis earlier. No chest pain. Hgb 5.3. ECG SR with TWI AVL only. Took eliquis this morning.            No current outpatient medications on file.      Past Medical History:    Anemia    Asthma (HCC)    Back problem    BPH (benign prostatic hyperplasia)    Calculus of kidney    Cataract    Coronary atherosclerosis    Diabetes (HCC)    ESRD (end stage renal disease) on dialysis (HCC)    Essential hypertension    High blood pressure    High cholesterol    History of blood transfusion    Hyperlipidemia    Neuropathy    hands and feet    KRAIG on CPAP    Renal disorder    Sleep apnea    Visual impairment    glasses    Vocal cord paralysis, unilateral partial     Past Surgical History:   Procedure Laterality Date    Appendectomy      1981    Back surgery      Neck/back - 1998    Capsule endoscopy - internal  referral      Cataract      2021 and 2022    Cath bare metal stent (bms)      Colonoscopy      Colonoscopy N/A 2021    Procedure: COLONOSCOPY;  Surgeon: Michael Gautam MD;  Location: [facility] ENDO    Colonoscopy N/A 2024    Procedure: COLONOSCOPY;  Surgeon: Gabriel Saldana MD;  Location: [facility] ENDOSCOPY    Colonoscopy N/A 06/15/2024    Procedure: COLONOSCOPY;  Surgeon: Carlyn Storey MD;  Location: [facility] ENDOSCOPY    Colonoscopy N/A 2024    Procedure: COLONOSCOPY;  Surgeon: Gabriel Saldana MD;  Location: [facility] ENDOSCOPY    Hand/finger surgery unlisted      Accidental trauma    Spine surgery procedure unlisted      Upper gi endoscopy,diagnosis       Family History   Problem Relation Age of Onset    Cancer Father         Kidney    Breast Cancer Mother     Diabetes Mother     Diabetes Maternal Grandmother     Diabetes Maternal Grandfather     Heart Disorder Other         Uncle      Social History:  Social History     Socioeconomic History    Marital status:    Tobacco Use    Smoking status: Former     Current packs/day: 0.00     Average packs/day: 1 pack/day for 17.0 years (17.0 ttl pk-yrs)     Types: Cigarettes     Start date: 1964     Quit date: 1981     Years since quittin.6    Smokeless tobacco: Never   Vaping Use    Vaping status: Never Used   Substance and Sexual Activity    Alcohol use: No     Alcohol/week: 0.0 standard drinks of alcohol    Drug use: No    Sexual activity: Yes     Partners: Female     Social Determinants of Health     Financial Resource Strain: Low Risk  (2024)    Financial Resource Strain     Difficulty of Paying Living Expenses: Not hard at all     Med Affordability: No   Food Insecurity: No Food Insecurity (2024)    Food Insecurity     Food Insecurity: Never true   Transportation Needs: No Transportation Needs (2024)    Transportation Needs     Lack of Transportation: No   Housing Stability: Low Risk  (2024)    Housing Stability     Housing  Instability: No          Medications:  Current Facility-Administered Medications   Medication Dose Route Frequency    hydrALAzine (Apresoline) 20 mg/mL injection 5 mg  5 mg Intravenous Q4H PRN    acetaminophen (Tylenol Extra Strength) tab 500 mg  500 mg Oral Q4H PRN    ondansetron (Zofran) 4 MG/2ML injection 4 mg  4 mg Intravenous Q6H PRN    glucose (Dex4) 15 GM/59ML oral liquid 15 g  15 g Oral Q15 Min PRN    Or    glucose (Glutose) 40% oral gel 15 g  15 g Oral Q15 Min PRN    Or    glucose-vitamin C (Dex-4) chewable tab 4 tablet  4 tablet Oral Q15 Min PRN    Or    dextrose 50% injection 50 mL  50 mL Intravenous Q15 Min PRN    Or    glucose (Dex4) 15 GM/59ML oral liquid 30 g  30 g Oral Q15 Min PRN    Or    glucose (Glutose) 40% oral gel 30 g  30 g Oral Q15 Min PRN    Or    glucose-vitamin C (Dex-4) chewable tab 8 tablet  8 tablet Oral Q15 Min PRN    insulin aspart (NovoLOG) 100 Units/mL FlexPen 1-5 Units  1-5 Units Subcutaneous TID CC    metoclopramide (Reglan) 5 mg/mL injection 5 mg  5 mg Intravenous Q8H PRN       Allergies  Allergies   Allergen Reactions    Adhesive Tape OTHER (SEE COMMENTS)     Severe rashes    Dust Mite Extract RASH     EXAM:         TELE: SR      /60 (BP Location: Left arm)   Pulse 73   Temp 97.4 °F (36.3 °C) (Temporal)   Resp 16   Ht 64\"   Wt 158 lb 4.6 oz (71.8 kg)   SpO2 93%   BMI 27.17 kg/m²   Temp (24hrs), Av.4 °F (36.3 °C), Min:96.9 °F (36.1 °C), Max:98.5 °F (36.9 °C)    Wt Readings from Last 3 Encounters:   24 158 lb 4.6 oz (71.8 kg)   24 156 lb (70.8 kg)   24 146 lb 8 oz (66.5 kg)         Intake/Output Summary (Last 24 hours) at 8/10/2024 0709  Last data filed at 8/10/2024 0600  Gross per 24 hour   Intake 1816.67 ml   Output 575 ml   Net 1241.67 ml         GENERAL: well developed, well nourished, in no apparent distress  SKIN: no rashes  HEENT: atraumatic, normocephalic, throat without erythema  NECK: supple, no bruits  LUNGS: clear to  auscultation  CARDIO: RRR without murmur or S3   GI: soft, nontender  EXTREMITIES: no cyanosis, clubbing or edema  NEUROLOGY: alert  PSYCH: cooperative      LABORATORY DATA:       ECG: SR, TWI AVL, no other acute changes    ECHO:    Labs:  Recent Labs   Lab 08/09/24  1441 08/10/24  0354   * 99   BUN 33* 37*   CREATSERUM 3.16* 3.99*   CA 8.7 9.3    139   K 3.9 4.5    102   CO2 33.0* 32.0     No results for input(s): \"TROP\" in the last 168 hours.  Recent Labs   Lab 08/10/24  0354   RBC 2.79*   HGB 8.5*   HCT 25.5*   MCV 91.4   MCH 30.5   MCHC 33.3   RDW 15.4*   NEPRELIM 5.55   WBC 8.0   .0*     No results for input(s): \"TROP\", \"TROPHS\", \"CK\" in the last 168 hours.    Imaging:  CTA ABD/PEL (CPT=74174)    Result Date: 8/9/2024  CONCLUSION:   No active contrast extravasation.  No intra-abdominal or intrapelvic hematoma.  Nondilated fluid-filled loops of small bowel are nonspecific but can be seen with enteritis. Liquid stool seen throughout the colon suggestive of diarrhea.  Overall constellation of findings are suggestive of a mild diffuse enterocolitis, likely from infectious or inflammatory etiology.  No obstruction.   Peripheral interstitial and reticular opacities within the bilateral lower lungs is partially seen suggestive of developing fibrotic changes or interstitial lung disease.  No honeycombing is seen within the visualized lower lungs.   Multiple other incidental findings as described in the body of the report which are unchanged.  elm-remote    Dictated by (CST): Jeff Novak MD on 8/09/2024 at 3:15 PM     Finalized by (CST): Jeff Novak MD on 8/09/2024 at 3:25 PM                CATH 5/24:  ANGIOGRAPHY:    Left Main: Large vessel bifurcating into the LAD and circumflex.  20% distal disease.     LAD: Large vessel that gives off a large diagonal branch and wraps the apex to supply a portion of the inferior wall.  There is a 70% lesion of the apical LAD as it wraps the apex.     Lcx:  Large vessel that gives off a large OM1 branch.  There is a discrete 80% lesion of the circumflex just proximal to the OM1 takeoff.     RCA: Small vessel that gives off the PDA and RPL branches.     Right common femoral arteriography shows no significant stenoses involving the right common femoral artery. A sheath is seen inserting into the right common femoral artery superior to the femoral bifurcation, but inferior to the pelvic rim.     CONCLUSIONS:  Coronary artery disease as described above.  Successful IVUS guided PCI of the circumflex with a Ramin frontier 2.75 mm x 15 mm ABIMBOLA postdilated to 3.0 mm distally and 3.5 mm proximally.  Conversion of stenosis from 80% to 0%.  DORIS-3 flow pre and postintervention

## 2024-08-10 NOTE — PROGRESS NOTES
[facility]  part of [facility]  Hospitalist Progress Note     Ollie Hernández Patient Status:  Inpatient    1945  79 year old CSN [id number]   Location -A Attending Denis Julian MD   Hosp Day # 1 PCP Wili Parmar MD     Assessment & Plan:   ----------------------------------  Acute GI bleed.  Location is most likely small instestine.  Hemodynamically stable.  Differential includes esophagitis, gastritis, ulcer disease, vascular lesions.  Malignancy cannot be ruled out.  Patient has had several upper and lower endoscopies in last few months for similar presentations.  He is on Eliquis for paroxysmal atrial fibrillation.  -PPI  -GI consult appreciated  -Transfuse as necessary to keep hemoglobin above 7  -Diet: N clear liquid diet  -IV fluids: None  -Recheck hemoglobin a.m.  -Monitor hemodynamics closely  -Hold antiplatelets/anticoagulation  -capsule endoscopy in Progress    Anemia, acute blood loss.  Blood loss due to GI bleed.  Hemoglobin is much better after transfusion of 3 units packed red blood cells.  Hemodynamically stable. Ongoing bleeding not present.  -Transfuse to keep hemoglobin above 7  -Recheck hemoglobin a.m.  -Antiplatelets, anticoagulation: Eliquis on hold    Atrial fibrillation, paroxysmal.  Without RVR.  Hemodynamically stable. Anticoagulation with Eliquis on hold.  -Cardiology consult appreciated  -Monitor on telemetry  -Monitor electrolytes  -Anticoagulation as above  -Rate/rhythm control with Coreg  -Outpatient workup for Watchman in progress    Other problems  End-stage renal disease on hemodialysis  Hypotension  Hypovolemia  Type 2 diabetes    dvt prophylaxis: scd  code status: full code  dispo: transfer to floor    I personally reviewed the available laboratories, imaging including hemoglobin. I discussed/will discuss the case with GI. I ordered laboratories, studies including CBC. I adjusted medications including insulin. Medical decision making high,  risk is high.    Subjective:   ----------------------------------  Feeling much better after transfusions.  No further bleeding.  Started Diascopy This Morning.  No Chest Pain or Shortness of Breath.  Has Not Really Been Walking around.      Objective:   Chief Complaint:   Chief Complaint   Patient presents with    GI Bleeding     ----------------------------------  Temp:  [96.9 °F (36.1 °C)-98.5 °F (36.9 °C)] 97.4 °F (36.3 °C)  Pulse:  [] 74  Resp:  [11-22] 22  BP: ()/(33-83) 153/60  SpO2:  [93 %-100 %] 98 %  Gen: A+Ox3.  No distress.   HEENT: NCAT, neck supple, no carotid bruit.  CV: RRR, S1S2, and intact distal pulses. No gallop, rub, murmur.  Pulm: Effort and breath sounds normal. No distress, wheezes, rales, rhonchi.  Abd: Soft, NTND, BS normal, no mass, no HSM, no rebound/guarding.   Neuro: Normal reflexes, CN. Sensory/motor exams grossly normal deficit.   MS: No joint effusions.  No peripheral edema.  Skin: Skin is warm and dry. No rashes, erythema, diaphoresis.   Psych: Normal mood and affect. Calm, cooperative    Labs:  Lab Results   Component Value Date    HGB 8.5 (L) 08/10/2024    WBC 8.0 08/10/2024    .0 (L) 08/10/2024     08/10/2024    K 4.5 08/10/2024    CREATSERUM 3.99 (H) 08/10/2024    INR 1.45 (H) 08/09/2024    AST 17 08/09/2024    ALT 16 08/09/2024    TROP <0.045 07/25/2019          epoetin donna  10,000 Units Subcutaneous Once per day on Monday Wednesday Friday    insulin aspart  1-5 Units Subcutaneous TID CC       hydrALAzine    ipratropium-albuterol    albuterol    acetaminophen    ondansetron    glucose **OR** glucose **OR** glucose-vitamin C **OR** dextrose **OR** glucose **OR** glucose **OR** glucose-vitamin C    metoclopramide  **Certification      PHYSICIAN Certification of Need for Inpatient Hospitalization - Initial Certification    Patient will require inpatient services that will reasonably be expected to span two midnight's based on the clinical documentation in  H+P.   Based on patients current state of illness, I anticipate that, after discharge, patient will require TBD.

## 2024-08-10 NOTE — PLAN OF CARE
Pt A/O x4. Follows commands. CPAP at night. 1 U PRBCs. Hgb 8.5 at this time. Hourly nursing rounds made. Safety maintained. Plan of care ongoing.     Problem: CARDIOVASCULAR - ADULT  Goal: Maintains optimal cardiac output and hemodynamic stability  Description: INTERVENTIONS:  - Monitor vital signs, rhythm, and trends  - Monitor for bleeding, hypotension and signs of decreased cardiac output  - Evaluate effectiveness of vasoactive medications to optimize hemodynamic stability  - Monitor arterial and/or venous puncture sites for bleeding and/or hematoma  - Assess quality of pulses, skin color and temperature  - Assess for signs of decreased coronary artery perfusion - ex. Angina  - Evaluate fluid balance, assess for edema, trend weights  Outcome: Progressing  Goal: Absence of cardiac arrhythmias or at baseline  Description: INTERVENTIONS:  - Continuous cardiac monitoring, monitor vital signs, obtain 12 lead EKG if indicated  - Evaluate effectiveness of antiarrhythmic and heart rate control medications as ordered  - Initiate emergency measures for life threatening arrhythmias  - Monitor electrolytes and administer replacement therapy as ordered  Outcome: Progressing     Problem: RESPIRATORY - ADULT  Goal: Achieves optimal ventilation and oxygenation  Description: INTERVENTIONS:  - Assess for changes in respiratory status  - Assess for changes in mentation and behavior  - Position to facilitate oxygenation and minimize respiratory effort  - Oxygen supplementation based on oxygen saturation or ABGs  - Provide Smoking Cessation handout, if applicable  - Encourage broncho-pulmonary hygiene including cough, deep breathe, Incentive Spirometry  - Assess the need for suctioning and perform as needed  - Assess and instruct to report SOB or any respiratory difficulty  - Respiratory Therapy support as indicated  - Manage/alleviate anxiety  - Monitor for signs/symptoms of CO2 retention  Outcome: Progressing     Problem:  HEMATOLOGIC - ADULT  Goal: Maintains hematologic stability  Description: INTERVENTIONS  - Assess for signs and symptoms of bleeding or hemorrhage  - Monitor labs and vital signs for trends  - Administer supportive blood products/factors, fluids and medications as ordered and appropriate  - Administer supportive blood products/factors as ordered and appropriate  Outcome: Progressing  Goal: Free from bleeding injury  Description: (Example usage: patient with low platelets)  INTERVENTIONS:  - Avoid intramuscular injections, enemas and rectal medication administration  - Ensure safe mobilization of patient  - Hold pressure on venipuncture sites to achieve adequate hemostasis  - Assess for signs and symptoms of internal bleeding  - Monitor lab trends  - Patient is to report abnormal signs of bleeding to staff  - Avoid use of toothpicks and dental floss  - Use electric shaver for shaving  - Use soft bristle tooth brush  - Limit straining and forceful nose blowing  Outcome: Progressing     Problem: PAIN - ADULT  Goal: Verbalizes/displays adequate comfort level or patient's stated pain goal  Description: INTERVENTIONS:  - Encourage pt to monitor pain and request assistance  - Assess pain using appropriate pain scale  - Administer analgesics based on type and severity of pain and evaluate response  - Implement non-pharmacological measures as appropriate and evaluate response  - Consider cultural and social influences on pain and pain management  - Manage/alleviate anxiety  - Utilize distraction and/or relaxation techniques  - Monitor for opioid side effects  - Notify MD/LIP if interventions unsuccessful or patient reports new pain  - Anticipate increased pain with activity and pre-medicate as appropriate  Outcome: Progressing     Problem: RISK FOR INFECTION - ADULT  Goal: Absence of fever/infection during anticipated neutropenic period  Description: INTERVENTIONS  - Monitor WBC  - Administer growth factors as ordered  -  Implement neutropenic guidelines  Outcome: Progressing     Problem: SAFETY ADULT - FALL  Goal: Free from fall injury  Description: INTERVENTIONS:  - Assess pt frequently for physical needs  - Identify cognitive and physical deficits and behaviors that affect risk of falls.  - [place] fall precautions as indicated by assessment.  - Educate pt/family on patient safety including physical limitations  - Instruct pt to call for assistance with activity based on assessment  - Modify environment to reduce risk of injury  - Provide assistive devices as appropriate  - Consider OT/PT consult to assist with strengthening/mobility  - Encourage toileting schedule  Outcome: Progressing     Problem: DISCHARGE PLANNING  Goal: Discharge to home or other facility with appropriate resources  Description: INTERVENTIONS:  - Identify barriers to discharge w/pt and caregiver  - Include patient/family/discharge partner in discharge planning  - Arrange for needed discharge resources and transportation as appropriate  - Identify discharge learning needs (meds, wound care, etc)  - Arrange for interpreters to assist at discharge as needed  - Consider post-discharge preferences of patient/family/discharge partner  - Complete POLST form as appropriate  - Assess patient's ability to be responsible for managing their own health  - Refer to Case Management Department for coordinating discharge planning if the patient needs post-hospital services based on physician/LIP order or complex needs related to functional status, cognitive ability or social support system  Outcome: Progressing

## 2024-08-10 NOTE — DISCHARGE INSTRUCTIONS
Sometimes managing your health at home requires assistance.  The [facility] team has recognized your preference to use [facility].  They can be reached by phone at [phone].  The fax number for your reference is [phone].  A representative from the home health agency will contact you or your family to schedule your first visit.

## 2024-08-10 NOTE — CM/SW NOTE
Received notice from liaison Vielka w/ [facility] - pt is pending start of care w/ their services.    Per Vielka, pt's wife was agreeable to HH services after he Dc'd on 8/1 but requested they begin 8/10.     Pt is now [facility]MITTED to [facility].     Per request, F2F entered for HH RN and PT services.      PLAN: Home w/ [facility] - pending needs & clinical course        SW/CM to remain available for support and/or discharge planning.         MS LucinaW, LSW [id number]

## 2024-08-11 LAB
BASOPHILS # BLD AUTO: 0.08 X10(3) UL (ref 0–0.2)
BASOPHILS NFR BLD AUTO: 0.8 %
DEPRECATED RDW RBC AUTO: 50.3 FL (ref 35.1–46.3)
EOSINOPHIL # BLD AUTO: 0.16 X10(3) UL (ref 0–0.7)
EOSINOPHIL NFR BLD AUTO: 1.6 %
ERYTHROCYTE [DISTWIDTH] IN BLOOD BY AUTOMATED COUNT: 15.2 % (ref 11–15)
GLUCOSE BLDC GLUCOMTR-MCNC: 131 MG/DL (ref 70–99)
GLUCOSE BLDC GLUCOMTR-MCNC: 135 MG/DL (ref 70–99)
GLUCOSE BLDC GLUCOMTR-MCNC: 145 MG/DL (ref 70–99)
GLUCOSE BLDC GLUCOMTR-MCNC: 197 MG/DL (ref 70–99)
HCT VFR BLD AUTO: 26.6 %
HGB BLD-MCNC: 9 G/DL
IMM GRANULOCYTES # BLD AUTO: 0.06 X10(3) UL (ref 0–1)
IMM GRANULOCYTES NFR BLD: 0.6 %
LYMPHOCYTES # BLD AUTO: 1.06 X10(3) UL (ref 1–4)
LYMPHOCYTES NFR BLD AUTO: 10.9 %
MCH RBC QN AUTO: 30.8 PG (ref 26–34)
MCHC RBC AUTO-ENTMCNC: 33.8 G/DL (ref 31–37)
MCV RBC AUTO: 91.1 FL
MONOCYTES # BLD AUTO: 0.58 X10(3) UL (ref 0.1–1)
MONOCYTES NFR BLD AUTO: 6 %
NEUTROPHILS # BLD AUTO: 7.78 X10 (3) UL (ref 1.5–7.7)
NEUTROPHILS # BLD AUTO: 7.78 X10(3) UL (ref 1.5–7.7)
NEUTROPHILS NFR BLD AUTO: 80.1 %
PLATELET # BLD AUTO: 164 10(3)UL (ref 150–450)
RBC # BLD AUTO: 2.92 X10(6)UL
WBC # BLD AUTO: 9.7 X10(3) UL (ref 4–11)

## 2024-08-11 PROCEDURE — 99232 SBSQ HOSP IP/OBS MODERATE 35: CPT | Performed by: INTERNAL MEDICINE

## 2024-08-11 PROCEDURE — 99233 SBSQ HOSP IP/OBS HIGH 50: CPT | Performed by: HOSPITALIST

## 2024-08-11 RX ORDER — ZOLPIDEM TARTRATE 5 MG/1
2.5 TABLET ORAL NIGHTLY PRN
Status: DISCONTINUED | OUTPATIENT
Start: 2024-08-11 | End: 2024-08-13

## 2024-08-11 NOTE — PROGRESS NOTES
[facility]  part of [facility]    Nephrology Progress Note    Chief Complaint   Patient presents with    GI Bleeding       Subjective:   79 year old male, following for ESRD on HD.     Denies BRBPR.     Review of Systems:   Review of Systems   Constitutional:  Positive for fatigue. Negative for chills and fever.   Respiratory:  Negative for cough and shortness of breath.    Cardiovascular:  Negative for chest pain and leg swelling.   Gastrointestinal:  Negative for abdominal pain, constipation, diarrhea, nausea and vomiting.       Objective:   Temp:  [97.3 °F (36.3 °C)-98.3 °F (36.8 °C)] 97.7 °F (36.5 °C)  Pulse:  [68-76] 75  Resp:  [13-23] 18  BP: (154-180)/(57-68) 154/59  SpO2:  [97 %-100 %] 98 %  SpO2: 98 %     Intake/Output Summary (Last 24 hours) at 8/11/2024 1215  Last data filed at 8/11/2024 0600  Gross per 24 hour   Intake 400 ml   Output 950 ml   Net -550 ml     Wt Readings from Last 3 Encounters:   08/09/24 158 lb 4.6 oz (71.8 kg)   08/06/24 156 lb (70.8 kg)   08/01/24 146 lb 8 oz (66.5 kg)     Physical Exam  Constitutional:       Appearance: Normal appearance.   Cardiovascular:      Rate and Rhythm: Normal rate and regular rhythm.      Heart sounds: Normal heart sounds.      Comments: AVF-pos bruit/thrill  Pulmonary:      Effort: Pulmonary effort is normal.      Breath sounds: Normal breath sounds.   Musculoskeletal:         General: Normal range of motion.   Skin:     General: Skin is warm and dry.   Neurological:      General: No focal deficit present.      Mental Status: He is alert and oriented to person, place, and time.   Psychiatric:         Mood and Affect: Mood normal.         Behavior: Behavior normal.         Medications:  Current Facility-Administered Medications   Medication Dose Route Frequency    hydrALAzine (Apresoline) 20 mg/mL injection 5 mg  5 mg Intravenous Q4H PRN    ipratropium-albuterol (Duoneb) 0.5-2.5 (3) MG/3ML inhalation solution 3 mL  3 mL Nebulization Q4H  PRN    albuterol (Ventolin HFA) 108 (90 Base) MCG/ACT inhaler 2 puff  2 puff Inhalation Q4H PRN    epoetin donna (Epogen, Procrit) 49665 UNIT/ML injection 10,000 Units  10,000 Units Subcutaneous Once per day on Monday Wednesday Friday    fluticasone-salmeterol (Advair Diskus) 250-50 MCG/ACT inhaler 1 puff  1 puff Inhalation BID    carvedilol (Coreg) tab 25 mg  25 mg Oral BID    escitalopram (Lexapro) tab 5 mg  5 mg Oral Daily    amLODIPine (Norvasc) tab 5 mg  5 mg Oral Daily    methocarbamol (Robaxin) tab 500 mg  500 mg Oral BID PRN    pantoprazole (Protonix) DR tab 40 mg  40 mg Oral Before breakfast    traMADol (Ultram) tab 50 mg  50 mg Oral Q12H PRN    bumetanide (Bumex) 0.25 MG/ML injection 2 mg  2 mg Intravenous Daily    zolpidem (Ambien) tab 5 mg  5 mg Oral Nightly PRN    hydrocortisone 1 % ointment   Topical TID    phenylephrine-min oil-markie (Formula R) 0.25-14-74.9 % rectal ointment   Rectal BID    acetaminophen (Tylenol Extra Strength) tab 500 mg  500 mg Oral Q4H PRN    ondansetron (Zofran) 4 MG/2ML injection 4 mg  4 mg Intravenous Q6H PRN    glucose (Dex4) 15 GM/59ML oral liquid 15 g  15 g Oral Q15 Min PRN    Or    glucose (Glutose) 40% oral gel 15 g  15 g Oral Q15 Min PRN    Or    glucose-vitamin C (Dex-4) chewable tab 4 tablet  4 tablet Oral Q15 Min PRN    Or    dextrose 50% injection 50 mL  50 mL Intravenous Q15 Min PRN    Or    glucose (Dex4) 15 GM/59ML oral liquid 30 g  30 g Oral Q15 Min PRN    Or    glucose (Glutose) 40% oral gel 30 g  30 g Oral Q15 Min PRN    Or    glucose-vitamin C (Dex-4) chewable tab 8 tablet  8 tablet Oral Q15 Min PRN    insulin aspart (NovoLOG) 100 Units/mL FlexPen 1-5 Units  1-5 Units Subcutaneous TID CC    metoclopramide (Reglan) 5 mg/mL injection 5 mg  5 mg Intravenous Q8H PRN              Results:     Recent Labs   Lab 08/09/24  1441 08/09/24  1951 08/10/24  0103 08/10/24  0354 08/11/24  0547   RBC 1.73*  --   --  2.79* 2.92*   HGB 5.3*   < > 8.0* 8.5* 9.0*   HCT 16.2*   < >  24.2* 25.5* 26.6*   MCV 93.6  --   --  91.4 91.1   NEPRELIM 6.29  --   --  5.55 7.78*   WBC 8.2  --   --  8.0 9.7   .0  --   --  147.0* 164.0    < > = values in this interval not displayed.     Recent Labs   Lab 08/09/24  1441 08/10/24  0354   * 99   BUN 33* 37*   CREATSERUM 3.16* 3.99*   CA 8.7 9.3   ALB 3.2  --     139   K 3.9 4.5    102   CO2 33.0* 32.0   ALKPHO 43*  --    AST 17  --    ALT 16  --    BILT 0.3  --    TP 5.6*  --      PTT   Date Value Ref Range Status   08/09/2024 30.1 23.0 - 36.0 seconds Final     INR   Date Value Ref Range Status   08/09/2024 1.45 (H) 0.80 - 1.20 Final     Comment:     Therapeutic INR range for patients on warfarin:  2.0-3.0 for most medical conditions and surgical prophylaxis   2.5-3.5 for mechanical heart valves and recurrent thromboembolism    Direct thrombin inhibitors (e.g. argatroban, bivalirudin), factor Xa inhibitors (e.g. apixaban, rivaroxaban), and conditions such as coagulation factor deficiency, vitamin K deficiency, and liver disease will prolong the prothrombin time/INR.    Unfractionated heparin and low molecular weight heparin do not affect the prothrombin time/INR up to a concentration of 1 IU/mL and 1.5 IU/mL, respectively.         No results for input(s): \"BNP\" in the last 168 hours.  No results for input(s): \"MG\", \"PHOS\" in the last 168 hours.     Recent Labs   Lab 08/09/24  1441   ALB 3.2       CTA ABD/PEL (CPT=74174)    Result Date: 8/9/2024  CONCLUSION:   No active contrast extravasation.  No intra-abdominal or intrapelvic hematoma.  Nondilated fluid-filled loops of small bowel are nonspecific but can be seen with enteritis. Liquid stool seen throughout the colon suggestive of diarrhea.  Overall constellation of findings are suggestive of a mild diffuse enterocolitis, likely from infectious or inflammatory etiology.  No obstruction.   Peripheral interstitial and reticular opacities within the bilateral lower lungs is partially seen  suggestive of developing fibrotic changes or interstitial lung disease.  No honeycombing is seen within the visualized lower lungs.   Multiple other incidental findings as described in the body of the report which are unchanged.  elm-remote    Dictated by (CST): Jeff Novak MD on 8/09/2024 at 3:15 PM     Finalized by (CST): Jeff Novak MD on 8/09/2024 at 3:25 PM         EKG    Result Date: 8/9/2024  Normal sinus rhythm Normal ECG When compared with ECG of 30-JUL-2024 04:27, Premature ventricular complexes are no longer Present Vent. rate has decreased BY  35 BPM Confirmed by BLAS DIEZ, RACHEL ([id number]) on 8/9/2024 4:58:48 PM     Assessment and Plan:     79 year old male with past medical history of T2DM, HTN, HLD, ESRD on HD MWF, CAD s/p PCI (5/2024), A.fib on Eliquis, HFpEF, KRAIG, BPH, history of recurrent gastrointestinal bleeding, who presented with BRBPR for the last 3-4 days.      GIB:   He received 3 units of pRBCs. Hb improved to 9  Epogen 10,000 units 3x/week ordered.   GI following.      ESRD HD MWF:   Next HD Monday.   He makes urine. Cont Bumex 2 mg IV daily      CAD/A.fib:   ASA and Eliquis on hold.   Cards following.      Mirta Redman MD  8/11/2024

## 2024-08-11 NOTE — PLAN OF CARE
Capsule study complete. To be resulted tomorrow. Okay to transfer off unit. Frequent BM but less maroon throughout the day and more brown currently.   Problem: CARDIOVASCULAR - ADULT  Goal: Maintains optimal cardiac output and hemodynamic stability  Description: INTERVENTIONS:  - Monitor vital signs, rhythm, and trends  - Monitor for bleeding, hypotension and signs of decreased cardiac output  - Evaluate effectiveness of vasoactive medications to optimize hemodynamic stability  - Monitor arterial and/or venous puncture sites for bleeding and/or hematoma  - Assess quality of pulses, skin color and temperature  - Assess for signs of decreased coronary artery perfusion - ex. Angina  - Evaluate fluid balance, assess for edema, trend weights  Outcome: Progressing  Goal: Absence of cardiac arrhythmias or at baseline  Description: INTERVENTIONS:  - Continuous cardiac monitoring, monitor vital signs, obtain 12 lead EKG if indicated  - Evaluate effectiveness of antiarrhythmic and heart rate control medications as ordered  - Initiate emergency measures for life threatening arrhythmias  - Monitor electrolytes and administer replacement therapy as ordered  Outcome: Progressing     Problem: RESPIRATORY - ADULT  Goal: Achieves optimal ventilation and oxygenation  Description: INTERVENTIONS:  - Assess for changes in respiratory status  - Assess for changes in mentation and behavior  - Position to facilitate oxygenation and minimize respiratory effort  - Oxygen supplementation based on oxygen saturation or ABGs  - Provide Smoking Cessation handout, if applicable  - Encourage broncho-pulmonary hygiene including cough, deep breathe, Incentive Spirometry  - Assess the need for suctioning and perform as needed  - Assess and instruct to report SOB or any respiratory difficulty  - Respiratory Therapy support as indicated  - Manage/alleviate anxiety  - Monitor for signs/symptoms of CO2 retention  Outcome: Progressing     Problem:  HEMATOLOGIC - ADULT  Goal: Maintains hematologic stability  Description: INTERVENTIONS  - Assess for signs and symptoms of bleeding or hemorrhage  - Monitor labs and vital signs for trends  - Administer supportive blood products/factors, fluids and medications as ordered and appropriate  - Administer supportive blood products/factors as ordered and appropriate  Outcome: Progressing  Goal: Free from bleeding injury  Description: (Example usage: patient with low platelets)  INTERVENTIONS:  - Avoid intramuscular injections, enemas and rectal medication administration  - Ensure safe mobilization of patient  - Hold pressure on venipuncture sites to achieve adequate hemostasis  - Assess for signs and symptoms of internal bleeding  - Monitor lab trends  - Patient is to report abnormal signs of bleeding to staff  - Avoid use of toothpicks and dental floss  - Use electric shaver for shaving  - Use soft bristle tooth brush  - Limit straining and forceful nose blowing  Outcome: Progressing     Problem: PAIN - ADULT  Goal: Verbalizes/displays adequate comfort level or patient's stated pain goal  Description: INTERVENTIONS:  - Encourage pt to monitor pain and request assistance  - Assess pain using appropriate pain scale  - Administer analgesics based on type and severity of pain and evaluate response  - Implement non-pharmacological measures as appropriate and evaluate response  - Consider cultural and social influences on pain and pain management  - Manage/alleviate anxiety  - Utilize distraction and/or relaxation techniques  - Monitor for opioid side effects  - Notify MD/LIP if interventions unsuccessful or patient reports new pain  - Anticipate increased pain with activity and pre-medicate as appropriate  Outcome: Progressing     Problem: RISK FOR INFECTION - ADULT  Goal: Absence of fever/infection during anticipated neutropenic period  Description: INTERVENTIONS  - Monitor WBC  - Administer growth factors as ordered  -  Implement neutropenic guidelines  Outcome: Progressing     Problem: SAFETY ADULT - FALL  Goal: Free from fall injury  Description: INTERVENTIONS:  - Assess pt frequently for physical needs  - Identify cognitive and physical deficits and behaviors that affect risk of falls.  - [place] fall precautions as indicated by assessment.  - Educate pt/family on patient safety including physical limitations  - Instruct pt to call for assistance with activity based on assessment  - Modify environment to reduce risk of injury  - Provide assistive devices as appropriate  - Consider OT/PT consult to assist with strengthening/mobility  - Encourage toileting schedule  Outcome: Progressing     Problem: DISCHARGE PLANNING  Goal: Discharge to home or other facility with appropriate resources  Description: INTERVENTIONS:  - Identify barriers to discharge w/pt and caregiver  - Include patient/family/discharge partner in discharge planning  - Arrange for needed discharge resources and transportation as appropriate  - Identify discharge learning needs (meds, wound care, etc)  - Arrange for interpreters to assist at discharge as needed  - Consider post-discharge preferences of patient/family/discharge partner  - Complete POLST form as appropriate  - Assess patient's ability to be responsible for managing their own health  - Refer to Case Management Department for coordinating discharge planning if the patient needs post-hospital services based on physician/LIP order or complex needs related to functional status, cognitive ability or social support system  Outcome: Progressing     Problem: Patient Centered Care  Goal: Patient preferences are identified and integrated in the patient's plan of care  Description: Interventions:  - What would you like us to know as we care for you?   - Provide timely, complete, and accurate information to patient/family  - Incorporate patient and family knowledge, values, beliefs, and cultural backgrounds into  the planning and delivery of care  - Encourage patient/family to participate in care and decision-making at the level they choose  - Honor patient and family perspectives and choices  Outcome: Progressing

## 2024-08-11 NOTE — PROGRESS NOTES
[facility]  part of [facility]  Hospitalist Progress Note     Ollie Hernández Patient Status:  Inpatient    1945  79 year old CSN [id number]   Location -A Attending Denis Julian MD   Hosp Day # 2 PCP Wili Parmar MD     Assessment & Plan:   ----------------------------------  Acute GI bleed.  Location is most likely small instestine.  Hemodynamically stable.  Differential includes esophagitis, gastritis, ulcer disease, vascular lesions.  Malignancy cannot be ruled out.  Patient has had several upper and lower endoscopies in last few months for similar presentations.  He is on Eliquis for paroxysmal atrial fibrillation.  -PPI  -GI consult appreciated  -Transfuse as necessary to keep hemoglobin above 7  -Diet: clear liquid diet  -IV fluids: None  -Recheck hemoglobin a.m.  -Monitor hemodynamics closely  -Hold antiplatelets/anticoagulation  -capsule endoscopy completed, reading pending    Anemia, acute blood loss.  Blood loss due to GI bleed.  Hemoglobin is much better after transfusion of 3 units packed red blood cells.  Hemodynamically stable. Ongoing bleeding not present.  -Transfuse to keep hemoglobin above 7  -Recheck hemoglobin a.m.  -Antiplatelets, anticoagulation: Eliquis on hold    Atrial fibrillation, paroxysmal.  Without RVR.  Hemodynamically stable. Anticoagulation with Eliquis on hold.  -Cardiology consult appreciated  -Timing and dosage of restarting Eliquis TBD  -Monitor on telemetry  -Monitor electrolytes  -Anticoagulation as above  -Rate/rhythm control with Coreg  -Outpatient workup for Watchman in progress    Other problems  End-stage renal disease on hemodialysis  Hypotension  Hypovolemia  Type 2 diabetes    dvt prophylaxis: scd  code status: full code  dispo: Home when medically stable    I personally reviewed the available laboratories, imaging including hemoglobin. I discussed/will discuss the case with GI. I ordered laboratories, studies including CBC. I  adjusted medications including insulin. Medical decision making high, risk is high.    Subjective:   ----------------------------------  Little groggy this morning after receiving Ambien last night.  Did not sleep well though.  Hemorrhoidal pain improved.  No chest pain or shortness of breath.  Ambulating without difficulty.  Tolerating clear liquids.  Patient seen with GI, discussed with wife and daughter at the bedside.      Objective:   Chief Complaint:   Chief Complaint   Patient presents with    GI Bleeding     ----------------------------------  Temp:  [97.3 °F (36.3 °C)-98.3 °F (36.8 °C)] 97.7 °F (36.5 °C)  Pulse:  [68-76] 75  Resp:  [13-23] 18  BP: (154-180)/(57-68) 154/59  SpO2:  [97 %-100 %] 98 %  Gen: A+Ox3.  No distress.   HEENT: NCAT, neck supple, no carotid bruit.  CV: RRR, S1S2, and intact distal pulses. No gallop, rub, murmur.  Pulm: Effort and breath sounds normal. No distress, wheezes, rales, rhonchi.  Abd: Soft, NTND, BS normal, no mass, no HSM, no rebound/guarding.   Neuro: Normal reflexes, CN. Sensory/motor exams grossly normal deficit.   MS: No joint effusions.  No peripheral edema.  Skin: Skin is warm and dry. No rashes, erythema, diaphoresis.   Psych: Normal mood and affect. Calm, cooperative    Labs:  Lab Results   Component Value Date    HGB 9.0 (L) 08/11/2024    WBC 9.7 08/11/2024    .0 08/11/2024     08/10/2024    K 4.5 08/10/2024    CREATSERUM 3.99 (H) 08/10/2024    INR 1.45 (H) 08/09/2024    AST 17 08/09/2024    ALT 16 08/09/2024    TROP <0.045 07/25/2019          epoetin donna  10,000 Units Subcutaneous Once per day on Monday Wednesday Friday    fluticasone-salmeterol  1 puff Inhalation BID    carvedilol  25 mg Oral BID    escitalopram  5 mg Oral Daily    amLODIPine  5 mg Oral Daily    pantoprazole  40 mg Oral Before breakfast    bumetanide  2 mg Intravenous Daily    hydrocortisone   Topical TID    phenylephrine-min oil-markie   Rectal BID    insulin aspart  1-5 Units  Subcutaneous TID CC       zolpidem    hydrALAzine    ipratropium-albuterol    albuterol    methocarbamol    traMADol    acetaminophen    ondansetron    glucose **OR** glucose **OR** glucose-vitamin C **OR** dextrose **OR** glucose **OR** glucose **OR** glucose-vitamin C    metoclopramide  **Certification      PHYSICIAN Certification of Need for Inpatient Hospitalization - Initial Certification    Patient will require inpatient services that will reasonably be expected to span two midnight's based on the clinical documentation in H+P.   Based on patients current state of illness, I anticipate that, after discharge, patient will require TBD.

## 2024-08-11 NOTE — PLAN OF CARE
Problem: CARDIOVASCULAR - ADULT  Goal: Maintains optimal cardiac output and hemodynamic stability  Description: INTERVENTIONS:  - Monitor vital signs, rhythm, and trends  - Monitor for bleeding, hypotension and signs of decreased cardiac output  - Evaluate effectiveness of vasoactive medications to optimize hemodynamic stability  - Monitor arterial and/or venous puncture sites for bleeding and/or hematoma  - Assess quality of pulses, skin color and temperature  - Assess for signs of decreased coronary artery perfusion - ex. Angina  - Evaluate fluid balance, assess for edema, trend weights  Outcome: Progressing     Problem: CARDIOVASCULAR - ADULT  Goal: Absence of cardiac arrhythmias or at baseline  Description: INTERVENTIONS:  - Continuous cardiac monitoring, monitor vital signs, obtain 12 lead EKG if indicated  - Evaluate effectiveness of antiarrhythmic and heart rate control medications as ordered  - Initiate emergency measures for life threatening arrhythmias  - Monitor electrolytes and administer replacement therapy as ordered  Outcome: Progressing     Problem: RESPIRATORY - ADULT  Goal: Achieves optimal ventilation and oxygenation  Description: INTERVENTIONS:  - Assess for changes in respiratory status  - Assess for changes in mentation and behavior  - Position to facilitate oxygenation and minimize respiratory effort  - Oxygen supplementation based on oxygen saturation or ABGs  - Provide Smoking Cessation handout, if applicable  - Encourage broncho-pulmonary hygiene including cough, deep breathe, Incentive Spirometry  - Assess the need for suctioning and perform as needed  - Assess and instruct to report SOB or any respiratory difficulty  - Respiratory Therapy support as indicated  - Manage/alleviate anxiety  - Monitor for signs/symptoms of CO2 retention  Outcome: Progressing     Transfer from CCU  Aox4, Compliant w/ CPAP at HS  BP initially elevated, hydralazine given x1, afebrile, RA  Had watery BM x1 -  Unable to collect specimen as patient stooled on the floor.  Stool has dark brown/red in color  Tramadol given for pain  SBA x1 w/ walker  Safety precautions maintained

## 2024-08-11 NOTE — PROGRESS NOTES
[facility]  part of [facility]    Progress Note    Ollie Hernández Patient Status:  Inpatient    1945 MRN [id number]   Location [facility] 2W/SW Attending Denis Julian MD   Hosp Day # 2 PCP Wili Parmar MD     Subjective:   Video capsule pending.  No acute events.    Objective:   Blood pressure 154/59, pulse 75, temperature 97.7 °F (36.5 °C), temperature source Oral, resp. rate 18, height 162.6 cm (5' 4\"), weight 158 lb 4.6 oz (71.8 kg), SpO2 98%.    GENERAL: in no apparent distress  CHEST/CARDIO: RRR without murmur  LUNGS: clear to auscultation  GI: good BS's and no tenderness      Results:   Lab Results   Component Value Date    WBC 9.7 2024    HGB 9.0 (L) 2024    HCT 26.6 (L) 2024    .0 2024    CREATSERUM 3.99 (H) 08/10/2024    BUN 37 (H) 08/10/2024     08/10/2024    K 4.5 08/10/2024     08/10/2024    CO2 32.0 08/10/2024    GLU 99 08/10/2024    CA 9.3 08/10/2024    ALB 3.2 2024    ALKPHO 43 (L) 2024    BILT 0.3 2024    TP 5.6 (L) 2024    AST 17 2024    ALT 16 2024    PTT 30.1 2024    INR 1.45 (H) 2024    T4F 0.9 2022    TSH 2.844 2024     (H) 2024    PSA 2.94 10/20/2021    ESRML 10 2024    CRP 1.30 (H) 2024    MG 1.7 2024    PHOS 5.0 2024    TROPHS 25 2022     2023    B12 672 2024       CTA ABD/PEL (CPT=74174)    Result Date: 2024  CONCLUSION:   No active contrast extravasation.  No intra-abdominal or intrapelvic hematoma.  Nondilated fluid-filled loops of small bowel are nonspecific but can be seen with enteritis. Liquid stool seen throughout the colon suggestive of diarrhea.  Overall constellation of findings are suggestive of a mild diffuse enterocolitis, likely from infectious or inflammatory etiology.  No obstruction.   Peripheral interstitial and reticular opacities within the bilateral lower lungs is  partially seen suggestive of developing fibrotic changes or interstitial lung disease.  No honeycombing is seen within the visualized lower lungs.   Multiple other incidental findings as described in the body of the report which are unchanged.  elm-remote    Dictated by (CST): Jeff Novak MD on 8/09/2024 at 3:15 PM     Finalized by (CST): Jeff Novak MD on 8/09/2024 at 3:25 PM         EKG    Result Date: 8/9/2024  Normal sinus rhythm Normal ECG When compared with ECG of 30-JUL-2024 04:27, Premature ventricular complexes are no longer Present Vent. 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     Rectal Bleeding;  Hx Colon AVM:  Afib on Eliquis:  - Patient presents back with similar to prior admissions with rectal bleeding of maroon colored stools, lack of abdominal pain, nausea, emesis, weight loss.  Admitted 6/2024 and again 2 weeks ago.  EGD/Colonoscopy 6/2024 with unremarkable EGD, Colonoscopy with bleeding AVM vs Dieulafoy in the Hepatic Flexure s/p APC.  Had repeat EGD/Colonoscopy 7/31/24 with again normal EGD and Colonoscopy with evidence of the previously placed clip in the hepatic flexure region.  Mucosa that it was attached to was erythematous, however no bleeding was noted.      - We discussed options of repeat Colonoscopy given known bleed in the hepatic flexure but low yield given repeat Colonoscopy and further treatment.  Patient and wife also defer repeat Colonoscopy.  EGD not recommended given negative exam x 2.  - Video Capsule Endoscopy completed     Plan:  - Video Capsule results pending read today  - Ideally needs to be considered for Watchman Procedure and eventual discontinuation of anticoagulation. Following with Dr. Smith and plan for CTA DEANA and follow up with Structural  NP this month.  - Transfuse to goal Hgb > 8.0 given cardiac history  - Monitor for overt bleeding      Corky Bustamante M.D.  [facility]  Gastroenterology & Hepatology

## 2024-08-11 NOTE — PROGRESS NOTES
[facility] CARDIOLOGY PROGRESS NOTE      ASSESSMENT/PLAN:     IMPRESSIONS:  Recurrent GI bleeding with Hgb 5.3 on presentation  PAF, currently SR  CAD s/p [place] circ 5/21/24  Chronic diastolic CHF  ESRD on HD  HTN  HL, on statin      PLAN:  Continue to hold antiplatelets and apixaban for today with recent severe anemia and pRBC requirement. Had video capsule endoscopy. He understands the risks of stent thrombosis and stroke while off these agents but given recent significant bleeding/anemia must hold for now. Ideally restart antiplatelet in the next 1-2 days pending clinical course.   Saw Dr Smith 8/8/24 for watchman eval. To get CTA DEANA 8/22/24 with return visit to see structural NP on 8/27/24  Appreciate ongoing GI evaluation.   Continue amlodipine and carvedilol  Restart statin  Cardiology to follow.     Patient Dr Phelan      Subjective:    Says he is feeling very good today. No CP/SOB. Had video capsule yesterday.           HPI  History PAF, CAD s/p PCI circ 5/24 has had multiple admissions for GI bleed. Last was 7/30/24. Had EGD/colonoscopy then: not revealing. Eliquis and ASA resumed a week ago. Saw Dr Smith for LAAO device eval yesterday. CTA DEANA scheduled in 2 weeks.     Now with dyspnea and BRBPR. Emesis earlier. No chest pain. Hgb 5.3. ECG SR with TWI AVL only. Took eliquis this morning.            No current outpatient medications on file.      Past Medical History:    Anemia    Asthma (HCC)    Back problem    BPH (benign prostatic hyperplasia)    Calculus of kidney    Cataract    Coronary atherosclerosis    Diabetes (HCC)    ESRD (end stage renal disease) on dialysis (HCC)    Essential hypertension    High blood pressure    High cholesterol    History of blood transfusion    Hyperlipidemia    Neuropathy    hands and feet    KRAIG on CPAP    Renal disorder    Sleep apnea    Visual impairment    glasses    Vocal cord paralysis, unilateral partial     Past Surgical History:   Procedure  Laterality Date    Appendectomy          Back surgery      Neck/back -     Capsule endoscopy - internal referral      Cataract      2021 and 2022    Cath bare metal stent (bms)      Colonoscopy      Colonoscopy N/A 2021    Procedure: COLONOSCOPY;  Surgeon: Michael Gautam MD;  Location: [facility] ENDO    Colonoscopy N/A 2024    Procedure: COLONOSCOPY;  Surgeon: Gabriel Saldana MD;  Location: [facility] ENDOSCOPY    Colonoscopy N/A 06/15/2024    Procedure: COLONOSCOPY;  Surgeon: Carlyn Storey MD;  Location: [facility] ENDOSCOPY    Colonoscopy N/A 2024    Procedure: COLONOSCOPY;  Surgeon: Gabriel Saldana MD;  Location: [facility] ENDOSCOPY    Hand/finger surgery unlisted      Accidental trauma    Spine surgery procedure unlisted      Upper gi endoscopy,diagnosis       Family History   Problem Relation Age of Onset    Cancer Father         Kidney    Breast Cancer Mother     Diabetes Mother     Diabetes Maternal Grandmother     Diabetes Maternal Grandfather     Heart Disorder Other         Uncle      Social History:  Social History     Socioeconomic History    Marital status:    Tobacco Use    Smoking status: Former     Current packs/day: 0.00     Average packs/day: 1 pack/day for 17.0 years (17.0 ttl pk-yrs)     Types: Cigarettes     Start date: 1964     Quit date: 1981     Years since quittin.6    Smokeless tobacco: Never   Vaping Use    Vaping status: Never Used   Substance and Sexual Activity    Alcohol use: No     Alcohol/week: 0.0 standard drinks of alcohol    Drug use: No    Sexual activity: Yes     Partners: Female     Social Determinants of Health     Financial Resource Strain: Low Risk  (2024)    Financial Resource Strain     Difficulty of Paying Living Expenses: Not hard at all     Med Affordability: No   Food Insecurity: No Food Insecurity (2024)    Food Insecurity     Food Insecurity: Never true   Transportation Needs: No Transportation Needs (2024)    Transportation  Needs     Lack of Transportation: No   Housing Stability: Low Risk  (8/9/2024)    Housing Stability     Housing Instability: No          Medications:  Current Facility-Administered Medications   Medication Dose Route Frequency    hydrALAzine (Apresoline) 20 mg/mL injection 5 mg  5 mg Intravenous Q4H PRN    ipratropium-albuterol (Duoneb) 0.5-2.5 (3) MG/3ML inhalation solution 3 mL  3 mL Nebulization Q4H PRN    albuterol (Ventolin HFA) 108 (90 Base) MCG/ACT inhaler 2 puff  2 puff Inhalation Q4H PRN    epoetin donna (Epogen, Procrit) 31898 UNIT/ML injection 10,000 Units  10,000 Units Subcutaneous Once per day on Monday Wednesday Friday    fluticasone-salmeterol (Advair Diskus) 250-50 MCG/ACT inhaler 1 puff  1 puff Inhalation BID    carvedilol (Coreg) tab 25 mg  25 mg Oral BID    escitalopram (Lexapro) tab 5 mg  5 mg Oral Daily    amLODIPine (Norvasc) tab 5 mg  5 mg Oral Daily    methocarbamol (Robaxin) tab 500 mg  500 mg Oral BID PRN    pantoprazole (Protonix) DR tab 40 mg  40 mg Oral Before breakfast    traMADol (Ultram) tab 50 mg  50 mg Oral Q12H PRN    bumetanide (Bumex) 0.25 MG/ML injection 2 mg  2 mg Intravenous Daily    zolpidem (Ambien) tab 5 mg  5 mg Oral Nightly PRN    hydrocortisone 1 % ointment   Topical TID    phenylephrine-min oil-markie (Formula R) 0.25-14-74.9 % rectal ointment   Rectal BID    acetaminophen (Tylenol Extra Strength) tab 500 mg  500 mg Oral Q4H PRN    ondansetron (Zofran) 4 MG/2ML injection 4 mg  4 mg Intravenous Q6H PRN    glucose (Dex4) 15 GM/59ML oral liquid 15 g  15 g Oral Q15 Min PRN    Or    glucose (Glutose) 40% oral gel 15 g  15 g Oral Q15 Min PRN    Or    glucose-vitamin C (Dex-4) chewable tab 4 tablet  4 tablet Oral Q15 Min PRN    Or    dextrose 50% injection 50 mL  50 mL Intravenous Q15 Min PRN    Or    glucose (Dex4) 15 GM/59ML oral liquid 30 g  30 g Oral Q15 Min PRN    Or    glucose (Glutose) 40% oral gel 30 g  30 g Oral Q15 Min PRN    Or    glucose-vitamin C (Dex-4) chewable  tab 8 tablet  8 tablet Oral Q15 Min PRN    insulin aspart (NovoLOG) 100 Units/mL FlexPen 1-5 Units  1-5 Units Subcutaneous TID CC    metoclopramide (Reglan) 5 mg/mL injection 5 mg  5 mg Intravenous Q8H PRN       Allergies  Allergies   Allergen Reactions    Adhesive Tape OTHER (SEE COMMENTS)     Severe rashes    Dust Mite Extract RASH     EXAM:         TELE:       /59 (BP Location: Left arm)   Pulse 75   Temp 97.7 °F (36.5 °C) (Oral)   Resp 18   Ht 64\"   Wt 158 lb 4.6 oz (71.8 kg)   SpO2 98%   BMI 27.17 kg/m²   Temp (24hrs), Av.6 °F (36.4 °C), Min:97.1 °F (36.2 °C), Max:98.3 °F (36.8 °C)    Wt Readings from Last 3 Encounters:   24 158 lb 4.6 oz (71.8 kg)   24 156 lb (70.8 kg)   24 146 lb 8 oz (66.5 kg)         Intake/Output Summary (Last 24 hours) at 2024 1111  Last data filed at 2024 0600  Gross per 24 hour   Intake 400 ml   Output 950 ml   Net -550 ml         GENERAL: well developed, well nourished, in no apparent distress  SKIN: no rashes  HEENT: atraumatic, normocephalic, throat without erythema  NECK: supple, no bruits  LUNGS: clear to auscultation  CARDIO: RRR without murmur or S3   GI: soft, nontender  EXTREMITIES: no cyanosis, clubbing or edema  NEUROLOGY: alert  PSYCH: cooperative      LABORATORY DATA:       ECG: SR, TWI AVL, no other acute changes      Labs:  Recent Labs   Lab 24  1441 08/10/24  0354   * 99   BUN 33* 37*   CREATSERUM 3.16* 3.99*   CA 8.7 9.3    139   K 3.9 4.5    102   CO2 33.0* 32.0     No results for input(s): \"TROP\" in the last 168 hours.  Recent Labs   Lab 24  0547   RBC 2.92*   HGB 9.0*   HCT 26.6*   MCV 91.1   MCH 30.8   MCHC 33.8   RDW 15.2*   NEPRELIM 7.78*   WBC 9.7   .0     No results for input(s): \"TROP\", \"TROPHS\", \"CK\" in the last 168 hours.    Imaging:  CTA ABD/PEL (CPT=74174)    Result Date: 2024  CONCLUSION:   No active contrast extravasation.  No intra-abdominal or intrapelvic hematoma.   Nondilated fluid-filled loops of small bowel are nonspecific but can be seen with enteritis. Liquid stool seen throughout the colon suggestive of diarrhea.  Overall constellation of findings are suggestive of a mild diffuse enterocolitis, likely from infectious or inflammatory etiology.  No obstruction.   Peripheral interstitial and reticular opacities within the bilateral lower lungs is partially seen suggestive of developing fibrotic changes or interstitial lung disease.  No honeycombing is seen within the visualized lower lungs.   Multiple other incidental findings as described in the body of the report which are unchanged.  elm-remote    Dictated by (CST): Jeff Novak MD on 8/09/2024 at 3:15 PM     Finalized by (CST): Jeff Novak MD on 8/09/2024 at 3:25 PM                CATH 5/24:  ANGIOGRAPHY:    Left Main: Large vessel bifurcating into the LAD and circumflex.  20% distal disease.     LAD: Large vessel that gives off a large diagonal branch and wraps the apex to supply a portion of the inferior wall.  There is a 70% lesion of the apical LAD as it wraps the apex.     Lcx: Large vessel that gives off a large OM1 branch.  There is a discrete 80% lesion of the circumflex just proximal to the OM1 takeoff.     RCA: Small vessel that gives off the PDA and RPL branches.     Right common femoral arteriography shows no significant stenoses involving the right common femoral artery. A sheath is seen inserting into the right common femoral artery superior to the femoral bifurcation, but inferior to the pelvic rim.     CONCLUSIONS:  Coronary artery disease as described above.  Successful IVUS guided PCI of the circumflex with a Butler frontier 2.75 mm x 15 mm ABIMBOLA postdilated to 3.0 mm distally and 3.5 mm proximally.  Conversion of stenosis from 80% to 0%.  DORIS-3 flow pre and postintervention

## 2024-08-12 ENCOUNTER — APPOINTMENT (OUTPATIENT)
Dept: GENERAL RADIOLOGY | Facility: HOSPITAL | Age: 79
End: 2024-08-12
Attending: INTERNAL MEDICINE
Payer: MEDICARE

## 2024-08-12 LAB
ANION GAP SERPL CALC-SCNC: 9 MMOL/L (ref 0–18)
BASOPHILS # BLD AUTO: 0.08 X10(3) UL (ref 0–0.2)
BASOPHILS NFR BLD AUTO: 0.7 %
BUN BLD-MCNC: 46 MG/DL (ref 9–23)
BUN/CREAT SERPL: 7.5 (ref 10–20)
CALCIUM BLD-MCNC: 9.5 MG/DL (ref 8.7–10.4)
CHLORIDE SERPL-SCNC: 101 MMOL/L (ref 98–112)
CO2 SERPL-SCNC: 28 MMOL/L (ref 21–32)
CREAT BLD-MCNC: 6.13 MG/DL
DEPRECATED RDW RBC AUTO: 49.8 FL (ref 35.1–46.3)
EGFRCR SERPLBLD CKD-EPI 2021: 9 ML/MIN/1.73M2 (ref 60–?)
EOSINOPHIL # BLD AUTO: 0.15 X10(3) UL (ref 0–0.7)
EOSINOPHIL NFR BLD AUTO: 1.2 %
ERYTHROCYTE [DISTWIDTH] IN BLOOD BY AUTOMATED COUNT: 14.9 % (ref 11–15)
GLUCOSE BLD-MCNC: 137 MG/DL (ref 70–99)
GLUCOSE BLDC GLUCOMTR-MCNC: 112 MG/DL (ref 70–99)
GLUCOSE BLDC GLUCOMTR-MCNC: 119 MG/DL (ref 70–99)
GLUCOSE BLDC GLUCOMTR-MCNC: 148 MG/DL (ref 70–99)
GLUCOSE BLDC GLUCOMTR-MCNC: 274 MG/DL (ref 70–99)
HCT VFR BLD AUTO: 29 %
HGB BLD-MCNC: 9.4 G/DL
IMM GRANULOCYTES # BLD AUTO: 0.08 X10(3) UL (ref 0–1)
IMM GRANULOCYTES NFR BLD: 0.7 %
LYMPHOCYTES # BLD AUTO: 0.88 X10(3) UL (ref 1–4)
LYMPHOCYTES NFR BLD AUTO: 7.2 %
MCH RBC QN AUTO: 29.8 PG (ref 26–34)
MCHC RBC AUTO-ENTMCNC: 32.4 G/DL (ref 31–37)
MCV RBC AUTO: 92.1 FL
MONOCYTES # BLD AUTO: 0.59 X10(3) UL (ref 0.1–1)
MONOCYTES NFR BLD AUTO: 4.8 %
NEUTROPHILS # BLD AUTO: 10.5 X10 (3) UL (ref 1.5–7.7)
NEUTROPHILS # BLD AUTO: 10.5 X10(3) UL (ref 1.5–7.7)
NEUTROPHILS NFR BLD AUTO: 85.4 %
OSMOLALITY SERPL CALC.SUM OF ELEC: 300 MOSM/KG (ref 275–295)
PLATELET # BLD AUTO: 188 10(3)UL (ref 150–450)
POTASSIUM SERPL-SCNC: 4.3 MMOL/L (ref 3.5–5.1)
RBC # BLD AUTO: 3.15 X10(6)UL
SODIUM SERPL-SCNC: 138 MMOL/L (ref 136–145)
WBC # BLD AUTO: 12.3 X10(3) UL (ref 4–11)

## 2024-08-12 PROCEDURE — 74018 RADEX ABDOMEN 1 VIEW: CPT | Performed by: INTERNAL MEDICINE

## 2024-08-12 PROCEDURE — 90935 HEMODIALYSIS ONE EVALUATION: CPT | Performed by: INTERNAL MEDICINE

## 2024-08-12 PROCEDURE — 5A1D70Z PERFORMANCE OF URINARY FILTRATION, INTERMITTENT, LESS THAN 6 HOURS PER DAY: ICD-10-PCS | Performed by: INTERNAL MEDICINE

## 2024-08-12 PROCEDURE — 99233 SBSQ HOSP IP/OBS HIGH 50: CPT | Performed by: HOSPITALIST

## 2024-08-12 RX ORDER — ADENOSINE 3 MG/ML
12 INJECTION, SOLUTION INTRAVENOUS ONCE
Status: DISCONTINUED | OUTPATIENT
Start: 2024-08-12 | End: 2024-08-12

## 2024-08-12 RX ORDER — METOCLOPRAMIDE HYDROCHLORIDE 5 MG/ML
5 INJECTION INTRAMUSCULAR; INTRAVENOUS
Status: DISCONTINUED | OUTPATIENT
Start: 2024-08-12 | End: 2024-08-13

## 2024-08-12 NOTE — PLAN OF CARE
Problem: CARDIOVASCULAR - ADULT  Goal: Maintains optimal cardiac output and hemodynamic stability  Description: INTERVENTIONS:  - Monitor vital signs, rhythm, and trends  - Monitor for bleeding, hypotension and signs of decreased cardiac output  - Evaluate effectiveness of vasoactive medications to optimize hemodynamic stability  - Monitor arterial and/or venous puncture sites for bleeding and/or hematoma  - Assess quality of pulses, skin color and temperature  - Assess for signs of decreased coronary artery perfusion - ex. Angina  - Evaluate fluid balance, assess for edema, trend weights  Outcome: Progressing  Goal: Absence of cardiac arrhythmias or at baseline  Description: INTERVENTIONS:  - Continuous cardiac monitoring, monitor vital signs, obtain 12 lead EKG if indicated  - Evaluate effectiveness of antiarrhythmic and heart rate control medications as ordered  - Initiate emergency measures for life threatening arrhythmias  - Monitor electrolytes and administer replacement therapy as ordered  Outcome: Progressing     Problem: RESPIRATORY - ADULT  Goal: Achieves optimal ventilation and oxygenation  Description: INTERVENTIONS:  - Assess for changes in respiratory status  - Assess for changes in mentation and behavior  - Position to facilitate oxygenation and minimize respiratory effort  - Oxygen supplementation based on oxygen saturation or ABGs  - Provide Smoking Cessation handout, if applicable  - Encourage broncho-pulmonary hygiene including cough, deep breathe, Incentive Spirometry  - Assess the need for suctioning and perform as needed  - Assess and instruct to report SOB or any respiratory difficulty  - Respiratory Therapy support as indicated  - Manage/alleviate anxiety  - Monitor for signs/symptoms of CO2 retention  Outcome: Progressing     Problem: HEMATOLOGIC - ADULT  Goal: Maintains hematologic stability  Description: INTERVENTIONS  - Assess for signs and symptoms of bleeding or hemorrhage  - Monitor  labs and vital signs for trends  - Administer supportive blood products/factors, fluids and medications as ordered and appropriate  - Administer supportive blood products/factors as ordered and appropriate  Outcome: Progressing  Goal: Free from bleeding injury  Description: (Example usage: patient with low platelets)  INTERVENTIONS:  - Avoid intramuscular injections, enemas and rectal medication administration  - Ensure safe mobilization of patient  - Hold pressure on venipuncture sites to achieve adequate hemostasis  - Assess for signs and symptoms of internal bleeding  - Monitor lab trends  - Patient is to report abnormal signs of bleeding to staff  - Avoid use of toothpicks and dental floss  - Use electric shaver for shaving  - Use soft bristle tooth brush  - Limit straining and forceful nose blowing  Outcome: Progressing     Problem: PAIN - ADULT  Goal: Verbalizes/displays adequate comfort level or patient's stated pain goal  Description: INTERVENTIONS:  - Encourage pt to monitor pain and request assistance  - Assess pain using appropriate pain scale  - Administer analgesics based on type and severity of pain and evaluate response  - Implement non-pharmacological measures as appropriate and evaluate response  - Consider cultural and social influences on pain and pain management  - Manage/alleviate anxiety  - Utilize distraction and/or relaxation techniques  - Monitor for opioid side effects  - Notify MD/LIP if interventions unsuccessful or patient reports new pain  - Anticipate increased pain with activity and pre-medicate as appropriate  Outcome: Progressing     Problem: RISK FOR INFECTION - ADULT  Goal: Absence of fever/infection during anticipated neutropenic period  Description: INTERVENTIONS  - Monitor WBC  - Administer growth factors as ordered  - Implement neutropenic guidelines  Outcome: Progressing     Problem: SAFETY ADULT - FALL  Goal: Free from fall injury  Description: INTERVENTIONS:  - Assess pt  frequently for physical needs  - Identify cognitive and physical deficits and behaviors that affect risk of falls.  - [place] fall precautions as indicated by assessment.  - Educate pt/family on patient safety including physical limitations  - Instruct pt to call for assistance with activity based on assessment  - Modify environment to reduce risk of injury  - Provide assistive devices as appropriate  - Consider OT/PT consult to assist with strengthening/mobility  - Encourage toileting schedule  Outcome: Progressing     Problem: DISCHARGE PLANNING  Goal: Discharge to home or other facility with appropriate resources  Description: INTERVENTIONS:  - Identify barriers to discharge w/pt and caregiver  - Include patient/family/discharge partner in discharge planning  - Arrange for needed discharge resources and transportation as appropriate  - Identify discharge learning needs (meds, wound care, etc)  - Arrange for interpreters to assist at discharge as needed  - Consider post-discharge preferences of patient/family/discharge partner  - Complete POLST form as appropriate  - Assess patient's ability to be responsible for managing their own health  - Refer to Case Management Department for coordinating discharge planning if the patient needs post-hospital services based on physician/LIP order or complex needs related to functional status, cognitive ability or social support system  Outcome: Progressing     Problem: Patient Centered Care  Goal: Patient preferences are identified and integrated in the patient's plan of care  Description: Interventions:  - What would you like us to know as we care for you? From home with wife  - Provide timely, complete, and accurate information to patient/family  - Incorporate patient and family knowledge, values, beliefs, and cultural backgrounds into the planning and delivery of care  - Encourage patient/family to participate in care and decision-making at the level they choose  - Honor  patient and family perspectives and choices  Outcome: Progressing

## 2024-08-12 NOTE — PROCEDURES
Capsule Endoscopy Report     Primary care physician: Wili Parmar MD  Date of procedure: 8/10/24  Indication: GI bleeding of obscure origin       After the risks and benefits were discussed with the patient and all questions were answered, the patient signed informed consent for the procedure. The patient ingested the Given M2A capsule endoscope without incident and completed the study according to standard protocol.     Gastric transit time: 5:30 minutes  Small bowel transit time: NA minutes  Bowel prep/study quality: Poor    Findings:   - Capsule retention in the stomach for the majority of the exam.  Patient and staff instructed to have patient ambulate and sit up in chair but patient noted to be laying down several hours after swallowing on day of procedure.    - No evidence of old or fresh blood in the in limited views of the duodenum     Impression:   Incomplete exam  No active bleeding on limited exam    Recommendation:   KUB to confirm capsule passage to cecum  Resume Eliquis however agree to discuss with Cardiology regarding decreasing dosage    Corky Bustamante M.D.  [facility]

## 2024-08-12 NOTE — PLAN OF CARE
Patient VSS, no acute changes during shift. Updated patient on plan of care. Frequent rounding, no needs at this time. Call light always in reach and safety precautions in place.     Problem: CARDIOVASCULAR - ADULT  Goal: Maintains optimal cardiac output and hemodynamic stability  Description: INTERVENTIONS:  - Monitor vital signs, rhythm, and trends  - Monitor for bleeding, hypotension and signs of decreased cardiac output  - Evaluate effectiveness of vasoactive medications to optimize hemodynamic stability  - Monitor arterial and/or venous puncture sites for bleeding and/or hematoma  - Assess quality of pulses, skin color and temperature  - Assess for signs of decreased coronary artery perfusion - ex. Angina  - Evaluate fluid balance, assess for edema, trend weights  Outcome: Progressing  Goal: Absence of cardiac arrhythmias or at baseline  Description: INTERVENTIONS:  - Continuous cardiac monitoring, monitor vital signs, obtain 12 lead EKG if indicated  - Evaluate effectiveness of antiarrhythmic and heart rate control medications as ordered  - Initiate emergency measures for life threatening arrhythmias  - Monitor electrolytes and administer replacement therapy as ordered  Outcome: Progressing     Problem: RESPIRATORY - ADULT  Goal: Achieves optimal ventilation and oxygenation  Description: INTERVENTIONS:  - Assess for changes in respiratory status  - Assess for changes in mentation and behavior  - Position to facilitate oxygenation and minimize respiratory effort  - Oxygen supplementation based on oxygen saturation or ABGs  - Provide Smoking Cessation handout, if applicable  - Encourage broncho-pulmonary hygiene including cough, deep breathe, Incentive Spirometry  - Assess the need for suctioning and perform as needed  - Assess and instruct to report SOB or any respiratory difficulty  - Respiratory Therapy support as indicated  - Manage/alleviate anxiety  - Monitor for signs/symptoms of CO2 retention  Outcome:  Progressing     Problem: HEMATOLOGIC - ADULT  Goal: Maintains hematologic stability  Description: INTERVENTIONS  - Assess for signs and symptoms of bleeding or hemorrhage  - Monitor labs and vital signs for trends  - Administer supportive blood products/factors, fluids and medications as ordered and appropriate  - Administer supportive blood products/factors as ordered and appropriate  Outcome: Progressing  Goal: Free from bleeding injury  Description: (Example usage: patient with low platelets)  INTERVENTIONS:  - Avoid intramuscular injections, enemas and rectal medication administration  - Ensure safe mobilization of patient  - Hold pressure on venipuncture sites to achieve adequate hemostasis  - Assess for signs and symptoms of internal bleeding  - Monitor lab trends  - Patient is to report abnormal signs of bleeding to staff  - Avoid use of toothpicks and dental floss  - Use electric shaver for shaving  - Use soft bristle tooth brush  - Limit straining and forceful nose blowing  Outcome: Progressing     Problem: PAIN - ADULT  Goal: Verbalizes/displays adequate comfort level or patient's stated pain goal  Description: INTERVENTIONS:  - Encourage pt to monitor pain and request assistance  - Assess pain using appropriate pain scale  - Administer analgesics based on type and severity of pain and evaluate response  - Implement non-pharmacological measures as appropriate and evaluate response  - Consider cultural and social influences on pain and pain management  - Manage/alleviate anxiety  - Utilize distraction and/or relaxation techniques  - Monitor for opioid side effects  - Notify MD/LIP if interventions unsuccessful or patient reports new pain  - Anticipate increased pain with activity and pre-medicate as appropriate  Outcome: Progressing     Problem: RISK FOR INFECTION - ADULT  Goal: Absence of fever/infection during anticipated neutropenic period  Description: INTERVENTIONS  - Monitor WBC  - Administer growth  factors as ordered  - Implement neutropenic guidelines  Outcome: Progressing     Problem: SAFETY ADULT - FALL  Goal: Free from fall injury  Description: INTERVENTIONS:  - Assess pt frequently for physical needs  - Identify cognitive and physical deficits and behaviors that affect risk of falls.  - [place] fall precautions as indicated by assessment.  - Educate pt/family on patient safety including physical limitations  - Instruct pt to call for assistance with activity based on assessment  - Modify environment to reduce risk of injury  - Provide assistive devices as appropriate  - Consider OT/PT consult to assist with strengthening/mobility  - Encourage toileting schedule  Outcome: Progressing     Problem: DISCHARGE PLANNING  Goal: Discharge to home or other facility with appropriate resources  Description: INTERVENTIONS:  - Identify barriers to discharge w/pt and caregiver  - Include patient/family/discharge partner in discharge planning  - Arrange for needed discharge resources and transportation as appropriate  - Identify discharge learning needs (meds, wound care, etc)  - Arrange for interpreters to assist at discharge as needed  - Consider post-discharge preferences of patient/family/discharge partner  - Complete POLST form as appropriate  - Assess patient's ability to be responsible for managing their own health  - Refer to Case Management Department for coordinating discharge planning if the patient needs post-hospital services based on physician/LIP order or complex needs related to functional status, cognitive ability or social support system  Outcome: Progressing     Problem: Patient Centered Care  Goal: Patient preferences are identified and integrated in the patient's plan of care  Description: Interventions:  - What would you like us to know as we care for you?   - Provide timely, complete, and accurate information to patient/family  - Incorporate patient and family knowledge, values, beliefs, and  cultural backgrounds into the planning and delivery of care  - Encourage patient/family to participate in care and decision-making at the level they choose  - Honor patient and family perspectives and choices  Outcome: Progressing

## 2024-08-12 NOTE — PROGRESS NOTES
[facility]  part of [facility]  Hospitalist Progress Note     Ollie Hernández Patient Status:  Inpatient    1945  79 year old CSN [id number]   Location -A Attending Denis Julian MD   Hosp Day # 3 PCP Wili Parmar MD     Assessment & Plan:   ----------------------------------  Acute GI bleed.  Location is most likely small instestine.  Hemodynamically stable.  Differential includes esophagitis, gastritis, ulcer disease, vascular lesions.  Malignancy cannot be ruled out.  Patient has had several upper and lower endoscopies in last few months for similar presentations.  He is on Eliquis for paroxysmal atrial fibrillation.  -PPI  -GI consult appreciated  -Transfuse as necessary to keep hemoglobin above 7  -Diet: NPO until VCE reviewed  -IV fluids: None  -Recheck hemoglobin a.m.  -Monitor hemodynamics closely  -Hold antiplatelets/anticoagulation  -capsule endoscopy completed, reading pending    Anemia, acute blood loss.  Blood loss due to GI bleed.  Hemoglobin is much better after transfusion of 3 units packed red blood cells.  Hemodynamically stable. Ongoing bleeding not present.  -Transfuse to keep hemoglobin above 7  -Recheck hemoglobin a.m.  -Antiplatelets, anticoagulation: Eliquis on hold    Atrial fibrillation, paroxysmal.  Without RVR.  Hemodynamically stable. Anticoagulation with Eliquis on hold.  -Cardiology consult appreciated  -Timing and dosage of restarting Eliquis TBD  -Monitor on telemetry  -Monitor electrolytes  -Anticoagulation as above  -Rate/rhythm control with Coreg  -Outpatient workup for Watchman in progress    Other problems  End-stage renal disease on hemodialysis  Hypotension  Hypovolemia  Type 2 diabetes    dvt prophylaxis: scd  code status: full code  dispo: Home when medically stable    I personally reviewed the available laboratories, imaging including hemoglobin. I discussed/will discuss the case with GI. I ordered laboratories, studies including CBC.  I adjusted medications including insulin. Medical decision making high, risk is high.    Subjective:   ----------------------------------  No bleeding overnight. No cp or sob.       Objective:   Chief Complaint:   Chief Complaint   Patient presents with    GI Bleeding     ----------------------------------  Temp:  [97.7 °F (36.5 °C)-97.9 °F (36.6 °C)] 97.7 °F (36.5 °C)  Pulse:  [71-75] 73  Resp:  [18-20] 20  BP: (154-175)/(59-71) 156/71  SpO2:  [95 %-98 %] 97 %  Gen: A+Ox3.  No distress.   HEENT: NCAT, neck supple, no carotid bruit.  CV: RRR, S1S2, and intact distal pulses. No gallop, rub, murmur.  Pulm: Effort and breath sounds normal. No distress, wheezes, rales, rhonchi.  Abd: Soft, NTND, BS normal, no mass, no HSM, no rebound/guarding.   Neuro: Normal reflexes, CN. Sensory/motor exams grossly normal deficit.   MS: No joint effusions.  No peripheral edema.  Skin: Skin is warm and dry. No rashes, erythema, diaphoresis.   Psych: Normal mood and affect. Calm, cooperative    Labs:  Lab Results   Component Value Date    HGB 9.4 (L) 08/12/2024    WBC 12.3 (H) 08/12/2024    .0 08/12/2024     08/12/2024    K 4.3 08/12/2024    CREATSERUM 6.13 (H) 08/12/2024    INR 1.45 (H) 08/09/2024    AST 17 08/09/2024    ALT 16 08/09/2024    TROP <0.045 07/25/2019          epoetin donna  10,000 Units Subcutaneous Once per day on Monday Wednesday Friday    fluticasone-salmeterol  1 puff Inhalation BID    carvedilol  25 mg Oral BID    escitalopram  5 mg Oral Daily    amLODIPine  5 mg Oral Daily    pantoprazole  40 mg Oral Before breakfast    bumetanide  2 mg Intravenous Daily    hydrocortisone   Topical TID    phenylephrine-min oil-markie   Rectal BID    insulin aspart  1-5 Units Subcutaneous TID CC       zolpidem    hydrALAzine    ipratropium-albuterol    albuterol    methocarbamol    traMADol    acetaminophen    ondansetron    glucose **OR** glucose **OR** glucose-vitamin C **OR** dextrose **OR** glucose **OR** glucose  **OR** glucose-vitamin C    metoclopramide  **Certification      PHYSICIAN Certification of Need for Inpatient Hospitalization - Initial Certification    Patient will require inpatient services that will reasonably be expected to span two midnight's based on the clinical documentation in H+P.   Based on patients current state of illness, I anticipate that, after discharge, patient will require TBD.

## 2024-08-12 NOTE — PROGRESS NOTES
[facility] CARDIOLOGY PROGRESS NOTE      ASSESSMENT/PLAN:     IMPRESSIONS:  Recurrent GI bleeding with Hgb 5.3 on presentation  PAF, currently SR  CAD s/p [place] circ 5/21/24  Chronic diastolic CHF  ESRD on HD  HTN  HL, on statin      PLAN:  Continue to hold antiplatelets and apixaban for today with recent severe anemia and pRBC requirement. Had video capsule endoscopy. He understands the risks of stent thrombosis and stroke while off these agents but given recent significant bleeding/anemia must hold for now. Resume aspirin 81mg today. Will resume eliquis in 1 week from today. Risk of stroke being off anticoagulant discussed in detail with patient and wife.   Saw Dr Smith 8/8/24 for watchman eval. To get CTA DEANA 8/22/24 with return visit to see structural NP on 8/27/24  Appreciate ongoing GI evaluation.   Continue amlodipine and carvedilol  Restart statin  Cardiology to follow.     Patient Dr Phelan      Subjective:        No chest pain or dyspnea. Wife at bedside.            HPI  History PAF, CAD s/p PCI circ 5/24 has had multiple admissions for GI bleed. Last was 7/30/24. Had EGD/colonoscopy then: not revealing. Eliquis and ASA resumed a week ago. Saw Dr Smith for LAAO device eval yesterday. CTA DEANA scheduled in 2 weeks.     Now with dyspnea and BRBPR. Emesis earlier. No chest pain. Hgb 5.3. ECG SR with TWI AVL only. Took eliquis this morning.            No current outpatient medications on file.      Past Medical History:    Anemia    Asthma (HCC)    Back problem    BPH (benign prostatic hyperplasia)    Calculus of kidney    Cataract    Coronary atherosclerosis    Diabetes (HCC)    ESRD (end stage renal disease) on dialysis (HCC)    Essential hypertension    High blood pressure    High cholesterol    History of blood transfusion    Hyperlipidemia    Neuropathy    hands and feet    KRAIG on CPAP    Renal disorder    Sleep apnea    Visual impairment    glasses    Vocal cord paralysis, unilateral  partial     Past Surgical History:   Procedure Laterality Date    Appendectomy          Back surgery      Neck/back -     Capsule endoscopy - internal referral      Cataract      2021 and 2022    Cath bare metal stent (bms)      Colonoscopy      Colonoscopy N/A 2021    Procedure: COLONOSCOPY;  Surgeon: Michael Gautam MD;  Location: [facility] ENDO    Colonoscopy N/A 2024    Procedure: COLONOSCOPY;  Surgeon: Gabriel Saldana MD;  Location: [facility] ENDOSCOPY    Colonoscopy N/A 06/15/2024    Procedure: COLONOSCOPY;  Surgeon: Carlyn Storey MD;  Location: [facility] ENDOSCOPY    Colonoscopy N/A 2024    Procedure: COLONOSCOPY;  Surgeon: Gabriel Saldana MD;  Location: [facility] ENDOSCOPY    Hand/finger surgery unlisted      Accidental trauma    Spine surgery procedure unlisted      Upper gi endoscopy,diagnosis       Family History   Problem Relation Age of Onset    Cancer Father         Kidney    Breast Cancer Mother     Diabetes Mother     Diabetes Maternal Grandmother     Diabetes Maternal Grandfather     Heart Disorder Other         Uncle      Social History:  Social History     Socioeconomic History    Marital status:    Tobacco Use    Smoking status: Former     Current packs/day: 0.00     Average packs/day: 1 pack/day for 17.0 years (17.0 ttl pk-yrs)     Types: Cigarettes     Start date: 1964     Quit date: 1981     Years since quittin.6    Smokeless tobacco: Never   Vaping Use    Vaping status: Never Used   Substance and Sexual Activity    Alcohol use: No     Alcohol/week: 0.0 standard drinks of alcohol    Drug use: No    Sexual activity: Yes     Partners: Female     Social Determinants of Health     Financial Resource Strain: Low Risk  (2024)    Financial Resource Strain     Difficulty of Paying Living Expenses: Not hard at all     Med Affordability: No   Food Insecurity: No Food Insecurity (2024)    Food Insecurity     Food Insecurity: Never true   Transportation Needs: No  Transportation Needs (8/9/2024)    Transportation Needs     Lack of Transportation: No   Housing Stability: Low Risk  (8/9/2024)    Housing Stability     Housing Instability: No          Medications:  Current Facility-Administered Medications   Medication Dose Route Frequency    metoclopramide (Reglan) 5 mg/mL injection 5 mg  5 mg Intravenous Daily PRN    zolpidem (Ambien) tab 2.5 mg  2.5 mg Oral Nightly PRN    hydrALAzine (Apresoline) 20 mg/mL injection 5 mg  5 mg Intravenous Q4H PRN    ipratropium-albuterol (Duoneb) 0.5-2.5 (3) MG/3ML inhalation solution 3 mL  3 mL Nebulization Q4H PRN    albuterol (Ventolin HFA) 108 (90 Base) MCG/ACT inhaler 2 puff  2 puff Inhalation Q4H PRN    epoetin donna (Epogen, Procrit) 82596 UNIT/ML injection 10,000 Units  10,000 Units Subcutaneous Once per day on Monday Wednesday Friday    fluticasone-salmeterol (Advair Diskus) 250-50 MCG/ACT inhaler 1 puff  1 puff Inhalation BID    carvedilol (Coreg) tab 25 mg  25 mg Oral BID    escitalopram (Lexapro) tab 5 mg  5 mg Oral Daily    amLODIPine (Norvasc) tab 5 mg  5 mg Oral Daily    methocarbamol (Robaxin) tab 500 mg  500 mg Oral BID PRN    pantoprazole (Protonix) DR tab 40 mg  40 mg Oral Before breakfast    traMADol (Ultram) tab 50 mg  50 mg Oral Q12H PRN    bumetanide (Bumex) 0.25 MG/ML injection 2 mg  2 mg Intravenous Daily    hydrocortisone 1 % ointment   Topical TID    phenylephrine-min oil-markie (Formula R) 0.25-14-74.9 % rectal ointment   Rectal BID    acetaminophen (Tylenol Extra Strength) tab 500 mg  500 mg Oral Q4H PRN    ondansetron (Zofran) 4 MG/2ML injection 4 mg  4 mg Intravenous Q6H PRN    glucose (Dex4) 15 GM/59ML oral liquid 15 g  15 g Oral Q15 Min PRN    Or    glucose (Glutose) 40% oral gel 15 g  15 g Oral Q15 Min PRN    Or    glucose-vitamin C (Dex-4) chewable tab 4 tablet  4 tablet Oral Q15 Min PRN    Or    dextrose 50% injection 50 mL  50 mL Intravenous Q15 Min PRN    Or    glucose (Dex4) 15 GM/59ML oral liquid 30 g  30 g  Oral Q15 Min PRN    Or    glucose (Glutose) 40% oral gel 30 g  30 g Oral Q15 Min PRN    Or    glucose-vitamin C (Dex-4) chewable tab 8 tablet  8 tablet Oral Q15 Min PRN    insulin aspart (NovoLOG) 100 Units/mL FlexPen 1-5 Units  1-5 Units Subcutaneous TID CC       Allergies  Allergies   Allergen Reactions    Adhesive Tape OTHER (SEE COMMENTS)     Severe rashes    Dust Mite Extract RASH     EXAM:         TELE: SR      /71 (BP Location: Left arm)   Pulse 73   Temp 97.7 °F (36.5 °C) (Oral)   Resp 20   Ht 5' 4\" (1.626 m)   Wt 158 lb 4.6 oz (71.8 kg)   SpO2 97%   BMI 27.17 kg/m²   Temp (24hrs), Av.8 °F (36.6 °C), Min:97.7 °F (36.5 °C), Max:97.9 °F (36.6 °C)    Wt Readings from Last 3 Encounters:   24 158 lb 4.6 oz (71.8 kg)   24 156 lb (70.8 kg)   24 146 lb 8 oz (66.5 kg)         Intake/Output Summary (Last 24 hours) at 2024 0914  Last data filed at 2024 0721  Gross per 24 hour   Intake --   Output 1050 ml   Net -1050 ml         GENERAL: well developed, well nourished, in no apparent distress  SKIN: no rashes  HEENT: atraumatic, normocephalic, throat without erythema  NECK: supple, no bruits  LUNGS: clear to auscultation  CARDIO: RRR without murmur or S3   GI: soft, nontender  EXTREMITIES: no cyanosis, clubbing or edema  NEUROLOGY: alert  PSYCH: cooperative      LABORATORY DATA:       ECG: SR, TWI AVL, no other acute changes      Labs:  Recent Labs   Lab 24  1441 08/10/24  0354 24  0615   * 99 137*   BUN 33* 37* 46*   CREATSERUM 3.16* 3.99* 6.13*   CA 8.7 9.3 9.5    139 138   K 3.9 4.5 4.3    102 101   CO2 33.0* 32.0 28.0     No results for input(s): \"TROP\" in the last 168 hours.  Recent Labs   Lab 24  0615   RBC 3.15*   HGB 9.4*   HCT 29.0*   MCV 92.1   MCH 29.8   MCHC 32.4   RDW 14.9   NEPRELIM 10.50*   WBC 12.3*   .0     No results for input(s): \"TROP\", \"TROPHS\", \"CK\" in the last 168 hours.    Imaging:  CTA ABD/PEL  (CPT=74174)    Result Date: 8/9/2024  CONCLUSION:   No active contrast extravasation.  No intra-abdominal or intrapelvic hematoma.  Nondilated fluid-filled loops of small bowel are nonspecific but can be seen with enteritis. Liquid stool seen throughout the colon suggestive of diarrhea.  Overall constellation of findings are suggestive of a mild diffuse enterocolitis, likely from infectious or inflammatory etiology.  No obstruction.   Peripheral interstitial and reticular opacities within the bilateral lower lungs is partially seen suggestive of developing fibrotic changes or interstitial lung disease.  No honeycombing is seen within the visualized lower lungs.   Multiple other incidental findings as described in the body of the report which are unchanged.  elm-remote    Dictated by (CST): Jeff Novak MD on 8/09/2024 at 3:15 PM     Finalized by (CST): Jeff Novak MD on 8/09/2024 at 3:25 PM                CATH 5/24:  ANGIOGRAPHY:    Left Main: Large vessel bifurcating into the LAD and circumflex.  20% distal disease.     LAD: Large vessel that gives off a large diagonal branch and wraps the apex to supply a portion of the inferior wall.  There is a 70% lesion of the apical LAD as it wraps the apex.     Lcx: Large vessel that gives off a large OM1 branch.  There is a discrete 80% lesion of the circumflex just proximal to the OM1 takeoff.     RCA: Small vessel that gives off the PDA and RPL branches.     Right common femoral arteriography shows no significant stenoses involving the right common femoral artery. A sheath is seen inserting into the right common femoral artery superior to the femoral bifurcation, but inferior to the pelvic rim.     CONCLUSIONS:  Coronary artery disease as described above.  Successful IVUS guided PCI of the circumflex with a [place] 2.75 mm x 15 mm ABIMBOLA postdilated to 3.0 mm distally and 3.5 mm proximally.  Conversion of stenosis from 80% to 0%.  DORIS-3 flow pre and  postintervention

## 2024-08-13 VITALS
DIASTOLIC BLOOD PRESSURE: 44 MMHG | HEART RATE: 71 BPM | SYSTOLIC BLOOD PRESSURE: 115 MMHG | OXYGEN SATURATION: 96 % | RESPIRATION RATE: 18 BRPM | BODY MASS INDEX: 24.7 KG/M2 | TEMPERATURE: 98 F | HEIGHT: 64 IN | WEIGHT: 144.69 LBS

## 2024-08-13 LAB
BASOPHILS # BLD AUTO: 0.04 X10(3) UL (ref 0–0.2)
BASOPHILS NFR BLD AUTO: 0.5 %
BLOOD TYPE BARCODE: 6200
DEPRECATED RDW RBC AUTO: 48.7 FL (ref 35.1–46.3)
DEPRECATED RDW RBC AUTO: 49 FL (ref 35.1–46.3)
EOSINOPHIL # BLD AUTO: 0.19 X10(3) UL (ref 0–0.7)
EOSINOPHIL NFR BLD AUTO: 2.4 %
ERYTHROCYTE [DISTWIDTH] IN BLOOD BY AUTOMATED COUNT: 14.7 % (ref 11–15)
ERYTHROCYTE [DISTWIDTH] IN BLOOD BY AUTOMATED COUNT: 15 % (ref 11–15)
GLUCOSE BLDC GLUCOMTR-MCNC: 114 MG/DL (ref 70–99)
GLUCOSE BLDC GLUCOMTR-MCNC: 190 MG/DL (ref 70–99)
GLUCOSE BLDC GLUCOMTR-MCNC: 93 MG/DL (ref 70–99)
HCT VFR BLD AUTO: 24.4 %
HCT VFR BLD AUTO: 25.2 %
HGB BLD-MCNC: 8.2 G/DL
HGB BLD-MCNC: 8.5 G/DL
IMM GRANULOCYTES # BLD AUTO: 0.09 X10(3) UL (ref 0–1)
IMM GRANULOCYTES NFR BLD: 1.1 %
LYMPHOCYTES # BLD AUTO: 0.96 X10(3) UL (ref 1–4)
LYMPHOCYTES NFR BLD AUTO: 11.9 %
MCH RBC QN AUTO: 30.5 PG (ref 26–34)
MCH RBC QN AUTO: 30.7 PG (ref 26–34)
MCHC RBC AUTO-ENTMCNC: 33.6 G/DL (ref 31–37)
MCHC RBC AUTO-ENTMCNC: 33.7 G/DL (ref 31–37)
MCV RBC AUTO: 90.7 FL
MCV RBC AUTO: 91 FL
MONOCYTES # BLD AUTO: 0.54 X10(3) UL (ref 0.1–1)
MONOCYTES NFR BLD AUTO: 6.7 %
NEUTROPHILS # BLD AUTO: 6.25 X10 (3) UL (ref 1.5–7.7)
NEUTROPHILS # BLD AUTO: 6.25 X10(3) UL (ref 1.5–7.7)
NEUTROPHILS NFR BLD AUTO: 77.4 %
PLATELET # BLD AUTO: 169 10(3)UL (ref 150–450)
PLATELET # BLD AUTO: 174 10(3)UL (ref 150–450)
RBC # BLD AUTO: 2.69 X10(6)UL
RBC # BLD AUTO: 2.77 X10(6)UL
UNIT VOLUME: 350 ML
WBC # BLD AUTO: 8.1 X10(3) UL (ref 4–11)
WBC # BLD AUTO: 8.7 X10(3) UL (ref 4–11)

## 2024-08-13 PROCEDURE — 99239 HOSP IP/OBS DSCHRG MGMT >30: CPT | Performed by: HOSPITALIST

## 2024-08-13 NOTE — PLAN OF CARE
Patient VSS with no acute changes during shift. Fluid and electrolyte imbalances educated by Dagoberto RN and reinforced by this RN. Safety precautions in place with call light in reach.     Problem: CARDIOVASCULAR - ADULT  Goal: Maintains optimal cardiac output and hemodynamic stability  Description: INTERVENTIONS:  - Monitor vital signs, rhythm, and trends  - Monitor for bleeding, hypotension and signs of decreased cardiac output  - Evaluate effectiveness of vasoactive medications to optimize hemodynamic stability  - Monitor arterial and/or venous puncture sites for bleeding and/or hematoma  - Assess quality of pulses, skin color and temperature  - Assess for signs of decreased coronary artery perfusion - ex. Angina  - Evaluate fluid balance, assess for edema, trend weights  Outcome: Progressing  Goal: Absence of cardiac arrhythmias or at baseline  Description: INTERVENTIONS:  - Continuous cardiac monitoring, monitor vital signs, obtain 12 lead EKG if indicated  - Evaluate effectiveness of antiarrhythmic and heart rate control medications as ordered  - Initiate emergency measures for life threatening arrhythmias  - Monitor electrolytes and administer replacement therapy as ordered  Outcome: Progressing     Problem: RESPIRATORY - ADULT  Goal: Achieves optimal ventilation and oxygenation  Description: INTERVENTIONS:  - Assess for changes in respiratory status  - Assess for changes in mentation and behavior  - Position to facilitate oxygenation and minimize respiratory effort  - Oxygen supplementation based on oxygen saturation or ABGs  - Provide Smoking Cessation handout, if applicable  - Encourage broncho-pulmonary hygiene including cough, deep breathe, Incentive Spirometry  - Assess the need for suctioning and perform as needed  - Assess and instruct to report SOB or any respiratory difficulty  - Respiratory Therapy support as indicated  - Manage/alleviate anxiety  - Monitor for signs/symptoms of CO2  retention  Outcome: Progressing     Problem: HEMATOLOGIC - ADULT  Goal: Maintains hematologic stability  Description: INTERVENTIONS  - Assess for signs and symptoms of bleeding or hemorrhage  - Monitor labs and vital signs for trends  - Administer supportive blood products/factors, fluids and medications as ordered and appropriate  - Administer supportive blood products/factors as ordered and appropriate  Outcome: Progressing  Goal: Free from bleeding injury  Description: (Example usage: patient with low platelets)  INTERVENTIONS:  - Avoid intramuscular injections, enemas and rectal medication administration  - Ensure safe mobilization of patient  - Hold pressure on venipuncture sites to achieve adequate hemostasis  - Assess for signs and symptoms of internal bleeding  - Monitor lab trends  - Patient is to report abnormal signs of bleeding to staff  - Avoid use of toothpicks and dental floss  - Use electric shaver for shaving  - Use soft bristle tooth brush  - Limit straining and forceful nose blowing  Outcome: Progressing     Problem: PAIN - ADULT  Goal: Verbalizes/displays adequate comfort level or patient's stated pain goal  Description: INTERVENTIONS:  - Encourage pt to monitor pain and request assistance  - Assess pain using appropriate pain scale  - Administer analgesics based on type and severity of pain and evaluate response  - Implement non-pharmacological measures as appropriate and evaluate response  - Consider cultural and social influences on pain and pain management  - Manage/alleviate anxiety  - Utilize distraction and/or relaxation techniques  - Monitor for opioid side effects  - Notify MD/LIP if interventions unsuccessful or patient reports new pain  - Anticipate increased pain with activity and pre-medicate as appropriate  Outcome: Progressing     Problem: RISK FOR INFECTION - ADULT  Goal: Absence of fever/infection during anticipated neutropenic period  Description: INTERVENTIONS  - Monitor WBC  -  Administer growth factors as ordered  - Implement neutropenic guidelines  Outcome: Progressing     Problem: SAFETY ADULT - FALL  Goal: Free from fall injury  Description: INTERVENTIONS:  - Assess pt frequently for physical needs  - Identify cognitive and physical deficits and behaviors that affect risk of falls.  - [place] fall precautions as indicated by assessment.  - Educate pt/family on patient safety including physical limitations  - Instruct pt to call for assistance with activity based on assessment  - Modify environment to reduce risk of injury  - Provide assistive devices as appropriate  - Consider OT/PT consult to assist with strengthening/mobility  - Encourage toileting schedule  Outcome: Progressing     Problem: DISCHARGE PLANNING  Goal: Discharge to home or other facility with appropriate resources  Description: INTERVENTIONS:  - Identify barriers to discharge w/pt and caregiver  - Include patient/family/discharge partner in discharge planning  - Arrange for needed discharge resources and transportation as appropriate  - Identify discharge learning needs (meds, wound care, etc)  - Arrange for interpreters to assist at discharge as needed  - Consider post-discharge preferences of patient/family/discharge partner  - Complete POLST form as appropriate  - Assess patient's ability to be responsible for managing their own health  - Refer to Case Management Department for coordinating discharge planning if the patient needs post-hospital services based on physician/LIP order or complex needs related to functional status, cognitive ability or social support system  Outcome: Progressing     Problem: Patient Centered Care  Goal: Patient preferences are identified and integrated in the patient's plan of care  Description: Interventions:  - What would you like us to know as we care for you?   - Provide timely, complete, and accurate information to patient/family  - Incorporate patient and family knowledge,  values, beliefs, and cultural backgrounds into the planning and delivery of care  - Encourage patient/family to participate in care and decision-making at the level they choose  - Honor patient and family perspectives and choices  Outcome: Progressing     Problem: METABOLIC/FLUID AND ELECTROLYTES - ADULT  Goal: Electrolytes maintained within normal limits  Description: INTERVENTIONS:  - Monitor labs and rhythm and assess patient for signs and symptoms of electrolyte imbalances  - Administer electrolyte replacement as ordered  - Monitor response to electrolyte replacements, including rhythm and repeat lab results as appropriate  - Fluid restriction as ordered  - Instruct patient on fluid and nutrition restrictions as appropriate  Outcome: Progressing

## 2024-08-13 NOTE — PLAN OF CARE
Patient adequate for discharge. PIV removed. AVS completed. Discharge eduction explained and all questions answered by this RN. All personal belongings taken with home. Nurse  emptied.    Problem: CARDIOVASCULAR - ADULT  Goal: Maintains optimal cardiac output and hemodynamic stability  Description: INTERVENTIONS:  - Monitor vital signs, rhythm, and trends  - Monitor for bleeding, hypotension and signs of decreased cardiac output  - Evaluate effectiveness of vasoactive medications to optimize hemodynamic stability  - Monitor arterial and/or venous puncture sites for bleeding and/or hematoma  - Assess quality of pulses, skin color and temperature  - Assess for signs of decreased coronary artery perfusion - ex. Angina  - Evaluate fluid balance, assess for edema, trend weights  Outcome: Adequate for Discharge  Goal: Absence of cardiac arrhythmias or at baseline  Description: INTERVENTIONS:  - Continuous cardiac monitoring, monitor vital signs, obtain 12 lead EKG if indicated  - Evaluate effectiveness of antiarrhythmic and heart rate control medications as ordered  - Initiate emergency measures for life threatening arrhythmias  - Monitor electrolytes and administer replacement therapy as ordered  Outcome: Adequate for Discharge     Problem: RESPIRATORY - ADULT  Goal: Achieves optimal ventilation and oxygenation  Description: INTERVENTIONS:  - Assess for changes in respiratory status  - Assess for changes in mentation and behavior  - Position to facilitate oxygenation and minimize respiratory effort  - Oxygen supplementation based on oxygen saturation or ABGs  - Provide Smoking Cessation handout, if applicable  - Encourage broncho-pulmonary hygiene including cough, deep breathe, Incentive Spirometry  - Assess the need for suctioning and perform as needed  - Assess and instruct to report SOB or any respiratory difficulty  - Respiratory Therapy support as indicated  - Manage/alleviate anxiety  - Monitor for  signs/symptoms of CO2 retention  Outcome: Adequate for Discharge     Problem: HEMATOLOGIC - ADULT  Goal: Maintains hematologic stability  Description: INTERVENTIONS  - Assess for signs and symptoms of bleeding or hemorrhage  - Monitor labs and vital signs for trends  - Administer supportive blood products/factors, fluids and medications as ordered and appropriate  - Administer supportive blood products/factors as ordered and appropriate  Outcome: Adequate for Discharge  Goal: Free from bleeding injury  Description: (Example usage: patient with low platelets)  INTERVENTIONS:  - Avoid intramuscular injections, enemas and rectal medication administration  - Ensure safe mobilization of patient  - Hold pressure on venipuncture sites to achieve adequate hemostasis  - Assess for signs and symptoms of internal bleeding  - Monitor lab trends  - Patient is to report abnormal signs of bleeding to staff  - Avoid use of toothpicks and dental floss  - Use electric shaver for shaving  - Use soft bristle tooth brush  - Limit straining and forceful nose blowing  Outcome: Adequate for Discharge     Problem: PAIN - ADULT  Goal: Verbalizes/displays adequate comfort level or patient's stated pain goal  Description: INTERVENTIONS:  - Encourage pt to monitor pain and request assistance  - Assess pain using appropriate pain scale  - Administer analgesics based on type and severity of pain and evaluate response  - Implement non-pharmacological measures as appropriate and evaluate response  - Consider cultural and social influences on pain and pain management  - Manage/alleviate anxiety  - Utilize distraction and/or relaxation techniques  - Monitor for opioid side effects  - Notify MD/LIP if interventions unsuccessful or patient reports new pain  - Anticipate increased pain with activity and pre-medicate as appropriate  Outcome: Adequate for Discharge     Problem: RISK FOR INFECTION - ADULT  Goal: Absence of fever/infection during  anticipated neutropenic period  Description: INTERVENTIONS  - Monitor WBC  - Administer growth factors as ordered  - Implement neutropenic guidelines  Outcome: Adequate for Discharge     Problem: SAFETY ADULT - FALL  Goal: Free from fall injury  Description: INTERVENTIONS:  - Assess pt frequently for physical needs  - Identify cognitive and physical deficits and behaviors that affect risk of falls.  - [place] fall precautions as indicated by assessment.  - Educate pt/family on patient safety including physical limitations  - Instruct pt to call for assistance with activity based on assessment  - Modify environment to reduce risk of injury  - Provide assistive devices as appropriate  - Consider OT/PT consult to assist with strengthening/mobility  - Encourage toileting schedule  Outcome: Adequate for Discharge     Problem: DISCHARGE PLANNING  Goal: Discharge to home or other facility with appropriate resources  Description: INTERVENTIONS:  - Identify barriers to discharge w/pt and caregiver  - Include patient/family/discharge partner in discharge planning  - Arrange for needed discharge resources and transportation as appropriate  - Identify discharge learning needs (meds, wound care, etc)  - Arrange for interpreters to assist at discharge as needed  - Consider post-discharge preferences of patient/family/discharge partner  - Complete POLST form as appropriate  - Assess patient's ability to be responsible for managing their own health  - Refer to Case Management Department for coordinating discharge planning if the patient needs post-hospital services based on physician/LIP order or complex needs related to functional status, cognitive ability or social support system  Outcome: Adequate for Discharge     Problem: Patient Centered Care  Goal: Patient preferences are identified and integrated in the patient's plan of care  Description: Interventions:  - What would you like us to know as we care for you?   - Provide  timely, complete, and accurate information to patient/family  - Incorporate patient and family knowledge, values, beliefs, and cultural backgrounds into the planning and delivery of care  - Encourage patient/family to participate in care and decision-making at the level they choose  - Honor patient and family perspectives and choices  Outcome: Adequate for Discharge     Problem: METABOLIC/FLUID AND ELECTROLYTES - ADULT  Goal: Electrolytes maintained within normal limits  Description: INTERVENTIONS:  - Monitor labs and rhythm and assess patient for signs and symptoms of electrolyte imbalances  - Administer electrolyte replacement as ordered  - Monitor response to electrolyte replacements, including rhythm and repeat lab results as appropriate  - Fluid restriction as ordered  - Instruct patient on fluid and nutrition restrictions as appropriate  Outcome: Adequate for Discharge

## 2024-08-13 NOTE — CM/SW NOTE
08/13/24 1200   Discharge disposition   Expected discharge disposition Home or Self   Outpatient services Dialysis  ([facility] [place] [facility] at 10am)   Discharge transportation Private car     Pt discussed in RN DC rounds. Pt received MDO for discharge. Pt is self

## 2024-08-13 NOTE — PROGRESS NOTES
[facility] CARDIOLOGY PROGRESS NOTE      ASSESSMENT/PLAN:     IMPRESSIONS:  Recurrent GI bleeding with Hgb 5.3 on presentation 3u PRBC  PAF, currently SR  CAD s/p Ramin circ 5/21/24  Chronic diastolic CHF  ESRD on HD  HTN  HL, on statin      PLAN:  Continue to hold antiplatelets and apixaban for today with recent severe anemia and pRBC requirement. Had video capsule endoscopy. He understands the risks of stent thrombosis and stroke while off these agents but given recent significant bleeding/anemia must hold for now. Resume aspirin 81mg today. Will resume eliquis in 1 week from today. I reviewed risk of stroke // MI being off anticoagulant discussed in detail with patient e.   Saw Dr Smith 8/8/24 for watchman eval. To get CTA DEANA 8/22/24 with return visit to see structural NP on 8/27/24  Appreciate ongoing GI evaluation.   Continue amlodipine and carvedilol  Restart statin  Cardiology to follow.     Patient Dr Phelan      Subjective:        No chest pain or dyspnea. Dialysis yesterday   Feels a little stronger this AM            HPI  History PAF, CAD s/p PCI circ 5/24 has had multiple admissions for GI bleed. Last was 7/30/24. Had EGD/colonoscopy then: not revealing. Eliquis and ASA resumed a week ago. Saw Dr Smith for LAAO device eval yesterday. CTA DEANA scheduled in 2 weeks.     Now with dyspnea and BRBPR. Emesis earlier. No chest pain. Hgb 5.3. ECG SR with TWI AVL only. Took eliquis this morning.of admission             No current outpatient medications on file.      Past Medical History:    Anemia    Asthma (HCC)    Back problem    BPH (benign prostatic hyperplasia)    Calculus of kidney    Cataract    Coronary atherosclerosis    Diabetes (HCC)    ESRD (end stage renal disease) on dialysis (HCC)    Essential hypertension    High blood pressure    High cholesterol    History of blood transfusion    Hyperlipidemia    Neuropathy    hands and feet    KRAIG on CPAP    Renal disorder    Sleep apnea     Visual impairment    glasses    Vocal cord paralysis, unilateral partial     Past Surgical History:   Procedure Laterality Date    Appendectomy          Back surgery      Neck/back -     Capsule endoscopy - internal referral      Cataract      2021 and 2022    Cath bare metal stent (bms)      Colonoscopy      Colonoscopy N/A 2021    Procedure: COLONOSCOPY;  Surgeon: Michael Gautam MD;  Location: [facility] ENDO    Colonoscopy N/A 2024    Procedure: COLONOSCOPY;  Surgeon: Gabriel Saldana MD;  Location: [facility] ENDOSCOPY    Colonoscopy N/A 06/15/2024    Procedure: COLONOSCOPY;  Surgeon: Carlyn Storey MD;  Location: [facility] ENDOSCOPY    Colonoscopy N/A 2024    Procedure: COLONOSCOPY;  Surgeon: aGbriel Saldana MD;  Location: [facility] ENDOSCOPY    Hand/finger surgery unlisted      Accidental trauma    Spine surgery procedure unlisted      Upper gi endoscopy,diagnosis       Family History   Problem Relation Age of Onset    Cancer Father         Kidney    Breast Cancer Mother     Diabetes Mother     Diabetes Maternal Grandmother     Diabetes Maternal Grandfather     Heart Disorder Other         Uncle      Social History:  Social History     Socioeconomic History    Marital status:    Tobacco Use    Smoking status: Former     Current packs/day: 0.00     Average packs/day: 1 pack/day for 17.0 years (17.0 ttl pk-yrs)     Types: Cigarettes     Start date: 1964     Quit date: 1981     Years since quittin.6    Smokeless tobacco: Never   Vaping Use    Vaping status: Never Used   Substance and Sexual Activity    Alcohol use: No     Alcohol/week: 0.0 standard drinks of alcohol    Drug use: No    Sexual activity: Yes     Partners: Female     Social Determinants of Health     Financial Resource Strain: Low Risk  (2024)    Financial Resource Strain     Difficulty of Paying Living Expenses: Not hard at all     Med Affordability: No   Food Insecurity: No Food Insecurity (2024)    Food  Insecurity     Food Insecurity: Never true   Transportation Needs: No Transportation Needs (8/9/2024)    Transportation Needs     Lack of Transportation: No   Housing Stability: Low Risk  (8/9/2024)    Housing Stability     Housing Instability: No          Medications:  Current Facility-Administered Medications   Medication Dose Route Frequency    metoclopramide (Reglan) 5 mg/mL injection 5 mg  5 mg Intravenous Daily PRN    zolpidem (Ambien) tab 2.5 mg  2.5 mg Oral Nightly PRN    hydrALAzine (Apresoline) 20 mg/mL injection 5 mg  5 mg Intravenous Q4H PRN    ipratropium-albuterol (Duoneb) 0.5-2.5 (3) MG/3ML inhalation solution 3 mL  3 mL Nebulization Q4H PRN    albuterol (Ventolin HFA) 108 (90 Base) MCG/ACT inhaler 2 puff  2 puff Inhalation Q4H PRN    epoetin donna (Epogen, Procrit) 39538 UNIT/ML injection 10,000 Units  10,000 Units Subcutaneous Once per day on Monday Wednesday Friday    fluticasone-salmeterol (Advair Diskus) 250-50 MCG/ACT inhaler 1 puff  1 puff Inhalation BID    carvedilol (Coreg) tab 25 mg  25 mg Oral BID    escitalopram (Lexapro) tab 5 mg  5 mg Oral Daily    amLODIPine (Norvasc) tab 5 mg  5 mg Oral Daily    methocarbamol (Robaxin) tab 500 mg  500 mg Oral BID PRN    pantoprazole (Protonix) DR tab 40 mg  40 mg Oral Before breakfast    traMADol (Ultram) tab 50 mg  50 mg Oral Q12H PRN    bumetanide (Bumex) 0.25 MG/ML injection 2 mg  2 mg Intravenous Daily    hydrocortisone 1 % ointment   Topical TID    phenylephrine-min oil-markie (Formula R) 0.25-14-74.9 % rectal ointment   Rectal BID    acetaminophen (Tylenol Extra Strength) tab 500 mg  500 mg Oral Q4H PRN    ondansetron (Zofran) 4 MG/2ML injection 4 mg  4 mg Intravenous Q6H PRN    glucose (Dex4) 15 GM/59ML oral liquid 15 g  15 g Oral Q15 Min PRN    Or    glucose (Glutose) 40% oral gel 15 g  15 g Oral Q15 Min PRN    Or    glucose-vitamin C (Dex-4) chewable tab 4 tablet  4 tablet Oral Q15 Min PRN    Or    dextrose 50% injection 50 mL  50 mL Intravenous  Q15 Min PRN    Or    glucose (Dex4) 15 GM/59ML oral liquid 30 g  30 g Oral Q15 Min PRN    Or    glucose (Glutose) 40% oral gel 30 g  30 g Oral Q15 Min PRN    Or    glucose-vitamin C (Dex-4) chewable tab 8 tablet  8 tablet Oral Q15 Min PRN    insulin aspart (NovoLOG) 100 Units/mL FlexPen 1-5 Units  1-5 Units Subcutaneous TID CC       Allergies  Allergies   Allergen Reactions    Adhesive Tape OTHER (SEE COMMENTS)     Severe rashes    Dust Mite Extract RASH     EXAM:         TELE: SR      /49 (BP Location: Left arm)   Pulse 70   Temp 98.5 °F (36.9 °C) (Oral)   Resp 18   Ht 162.6 cm (5' 4\")   Wt 158 lb 4.6 oz (71.8 kg)   SpO2 96%   BMI 27.17 kg/m²   Temp (24hrs), Av °F (36.7 °C), Min:97.7 °F (36.5 °C), Max:98.5 °F (36.9 °C)    Wt Readings from Last 3 Encounters:   24 158 lb 4.6 oz (71.8 kg)   24 156 lb (70.8 kg)   24 146 lb 8 oz (66.5 kg)         Intake/Output Summary (Last 24 hours) at 2024 0644  Last data filed at 2024 0058  Gross per 24 hour   Intake 358 ml   Output 550 ml   Net -192 ml         GENERAL: well developed, well nourished, in no apparent distress  SKIN: no rashes  HEENT: atraumatic, normocephalic, throat without erythema  NECK: supple, no bruits  LUNGS: clear to auscultation  CARDIO: RRR without murmur or S3   GI: soft, nontender  EXTREMITIES: no cyanosis, clubbing or edemaRUE fistula   NEUROLOGY: alert  PSYCH: cooperative      LABORATORY DATA:       ECG: SR, TWI AVL, no other acute changes      Labs:  Recent Labs   Lab 24  1441 08/10/24  0354 24  0615   * 99 137*   BUN 33* 37* 46*   CREATSERUM 3.16* 3.99* 6.13*   CA 8.7 9.3 9.5    139 138   K 3.9 4.5 4.3    102 101   CO2 33.0* 32.0 28.0     No results for input(s): \"TROP\" in the last 168 hours.  Recent Labs   Lab 24  0615   RBC 3.15*   HGB 9.4*   HCT 29.0*   MCV 92.1   MCH 29.8   MCHC 32.4   RDW 14.9   NEPRELIM 10.50*   WBC 12.3*   .0     No results for input(s):  \"TROP\", \"TROPHS\", \"CK\" in the last 168 hours.    Imaging:  XR ABDOMEN (1 VIEW) (CPT=74018)    Result Date: 8/12/2024  CONCLUSION:   The capsule device overlies the right lower quadrant, possibly terminal ileum or cecum.  No radiographic evidence of acute abnormality in the abdomen or pelvis.  Additional chronic or incidental findings are described in the body of this report.    Dictated by (CST): Florentino Mari MD on 8/12/2024 at 1:23 PM     Finalized by (CST): Florentino Mari MD on 8/12/2024 at 1:26 PM                CATH 5/24:  ANGIOGRAPHY:    Left Main: Large vessel bifurcating into the LAD and circumflex.  20% distal disease.     LAD: Large vessel that gives off a large diagonal branch and wraps the apex to supply a portion of the inferior wall.  There is a 70% lesion of the apical LAD as it wraps the apex.     Lcx: Large vessel that gives off a large OM1 branch.  There is a discrete 80% lesion of the circumflex just proximal to the OM1 takeoff.     RCA: Small vessel that gives off the PDA and RPL branches.     Right common femoral arteriography shows no significant stenoses involving the right common femoral artery. A sheath is seen inserting into the right common femoral artery superior to the femoral bifurcation, but inferior to the pelvic rim.     CONCLUSIONS:  Coronary artery disease as described above.  Successful IVUS guided PCI of the circumflex with a [place] 2.75 mm x 15 mm ABIMBOLA postdilated to 3.0 mm distally and 3.5 mm proximally.  Conversion of stenosis from 80% to 0%.  DORIS-3 flow pre and postintervention   Isabelle Fuentes Jessica  Doctor, Not Available

## 2024-08-13 NOTE — DISCHARGE SUMMARY
[facility]  part of [facility]     DISCHARGE SUMMARY     Ollie Hernández Patient Status:  Inpatient    1945 MRN [id number]   Location [facility] 5SW/SE Attending Dneis Julian MD   Hosp Day # 4 PCP Wili Parmar MD     DATE OF ADMISSION: 2024  DATE OF DISCHARGE:  24  DISPOSITION: home  CONDITION ON DISCHARGE: good    DISCHARGE DIAGNOSES:  Acute GI bleed  Anemia, acute blood loss  Atrial fibrillation, paroxysmal  End-stage renal disease on hemodialysis  Hypotension  Hypovolemia  Type 2 diabetes    HISTORY OF PRESENT ILLNESS (COPIED FROM ADMISSION H&P)  Patient is a 79-year-old  male with history of 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 Presented today to the emergency department for evaluation of maroonish-colored bowel movements for the last 3 to 4 days with generalized weakness and low blood pressure. He finished dialysis and noted to have systolic blood pressure of 70. In the emergency room, blood pressure 102/41. CBC showed hemoglobin of 5.3, which has dropped from baseline. INR 1.45. Chemistry still pending. Patient was initiated on blood transfusion. CT scan of the abdomen and pelvis still pending.     HOSPITAL COURSE:  Patient was admitted, transfused 3 units packed red blood cells.  Hemoglobin responded appropriately.  There is no ongoing bleeding.  Seen by GI.  Due to recent multiple endoscopies, this was not repeated.  He did undergo capsule endoscopy which was negative.  Antiplatelets and anticoagulation were held at the time of admission.  Antiplatelets will be restarted due to his recent stent.  He will restart Eliquis in about 1 week if there is no further bleeding.  Hopefully patient will be able to get his Watchman placed and come off anticoagulation soon.    Patient understands return to the emergency room for increased pain, fever, discharge, shortness of breath, chest pain, new  neurologic symptoms, other concerning symptoms.    PHYSICAL EXAM:  Temp:  [97.7 °F (36.5 °C)-98.5 °F (36.9 °C)] 97.7 °F (36.5 °C)  Pulse:  [67-77] 71  Resp:  [16-18] 18  BP: (115-179)/(44-61) 115/44  SpO2:  [95 %-100 %] 96 %  Gen: A+Ox3.  No distress.   HEENT: NCAT, neck supple, no carotid bruit.  CV: RRR, S1S2, and intact distal pulses. No gallop, rub, murmur.  Pulm: Effort and breath sounds normal. No distress, wheezes, rales, rhonchi.  Abd: Soft, NTND, BS normal, no mass, no HSM, no rebound/guarding.   Neuro: Normal reflexes, CN. Sensory/motor exams grossly normal deficit. Coordination  and gait normal.   MS: No joint effusions.  No peripheral edema.  Skin: Skin is warm and dry. No rashes, erythema, diaphoresis.   Psych: Normal mood and affect. Behavior and judgment normal.     DISCHARGE MEDICATIONS     Discharge Medications        ASK your doctor about these medications        Instructions Prescription details   acetaminophen 500 MG Tabs  Commonly known as: Tylenol Extra Strength      Take 1 tablet (500 mg total) by mouth daily as needed for Pain.   Refills: 0     albuterol 108 (90 Base) MCG/ACT Aers  Commonly known as: ProAir HFA      Inhale 2 puffs into the lungs every 4 (four) hours as needed for Wheezing.   Quantity: 3 each  Refills: 3     apixaban 5 MG Tabs  Commonly known as: Eliquis      Take 1 tablet (5 mg total) by mouth 2 (two) times daily.   Refills: 0     aspirin 81 MG Tbec      Take 1 tablet (81 mg total) by mouth daily.   Quantity: 90 tablet  Refills: 3     atorvastatin 40 MG Tabs  Commonly known as: Lipitor      Take 1 tablet (40 mg total) by mouth nightly.   Quantity: 90 tablet  Refills: 3     Budesonide-Formoterol Fumarate 160-4.5 MCG/ACT Aero  Commonly known as: Symbicort      Inhale 2 puffs into the lungs 2 (two) times daily.   Quantity: 3 each  Refills: 3     carvedilol 25 MG Tabs  Commonly known as: Coreg      Take 1 tablet (25 mg total) by mouth 2 (two) times daily.   Refills: 0      cetirizine 10 MG Tabs  Commonly known as: ZyrTEC      Take 1 tablet (10 mg total) by mouth every other day.   Refills: 0     Cholecalciferol 125 MCG (5000 UT) Tabs  Commonly known as: VITAMIN D-3      Take 1 tablet (5,000 Units total) by mouth 2 (two) times daily.   Refills: 0     Contour Next Test Strp  Generic drug: Glucose Blood      Test 3 times daily   Quantity: 300 each  Refills: 1     Darbepoetin Randell 60 MCG/ML Soln      Inject into the vein as needed.   Refills: 0     escitalopram 5 MG Tabs  Commonly known as: Lexapro      Take 1 tablet (5 mg total) by mouth daily.   Quantity: 90 tablet  Refills: 3     felodipine ER 10 MG Tb24  Commonly known as: Plendil      Take 1 tablet (10 mg total) by mouth daily.   Quantity: 90 tablet  Refills: 3     fluticasone propionate 50 MCG/ACT Susp  Commonly known as: Flonase      2 sprays by Nasal route daily.   Quantity: 48 g  Refills: 3     methocarbamol 500 MG Tabs  Commonly known as: Robaxin      Take 1 tablet (500 mg total) by mouth 2 (two) times daily as needed.   Quantity: 60 tablet  Refills: 1     pantoprazole 40 MG Tbec  Commonly known as: Protonix      TAKE 1 TABLET BY MOUTH EVERY  MORNING BEFORE BREAKFAST   Quantity: 90 tablet  Refills: 3     polyethylene glycol (PEG 3350) 17 g Pack  Commonly known as: Miralax      17 g Wed, Thurs, Sat   Refills: 0     tetrahydrozoline 0.05 % Soln  Commonly known as: Visine      1 drop 2 (two) times daily as needed.   Refills: 0     Tradjenta 5 MG Tabs  Generic drug: linaGLIPtin      Take 1 tablet (5 mg total) by mouth daily.   Quantity: 90 tablet  Refills: 1     traMADol 50 MG Tabs  Commonly known as: Ultram      Take 1 tablet (50 mg total) by mouth every 12 (twelve) hours as needed for Pain.   Refills: 0              CONSULTANTS  Consultants         Provider   Role Specialty     Emerson Julio MD      Consulting Physician Cardiovascular Diseases     Walker Klein MD      Consulting Physician NEPHROLOGY     Corky Bustamante  MD      Consulting Physician GASTROENTEROLOGY     Amilcar Amaro MD      Consulting Physician HOSPITALIST            FOLLOW UP:  Corky Bustamante MD  [address]   [address]  [facility] [address]  [phone]    Follow up  As needed    Wili Parmar MD  [address] [facility] [address]  [phone]    Follow up in 1 week(s)  Post Discharge Followup    Raymond Smtih MD  [address]  [address]  [facility] [id number] [phone]    Follow up  Post Discharge Followup    The above plan and follow-up instructions were reviewed with the patient and they verbalized understanding and agreement.  They understand to return to the emergency room for any concerning signs or symptoms.  Greater than 30 minutes spent on discharge.  -----------------------    Hospital Discharge Diagnoses: Acute GI bleed    Lace+ Score: 83  59-90 High Risk  29-58 Medium Risk  0-28   Low Risk.    TCM Follow-Up Recommendation:  LACE > 58: High Risk of readmission after discharge from the hospital.

## 2024-08-13 NOTE — PROGRESS NOTES
[facility]  part of [facility]    Progress Note    Ollie Hernández Patient Status:  Inpatient    1945 MRN [id number]   Location [facility] 5SW/SE Attending Denis Julian MD   Hosp Day # 3 PCP Wili Parmar MD       Subjective:   Ollie Hernández is a(n) 79 year old male     ROS:     Constitutional: Feeling okay on dialysis  ENMT:  Negative for ear drainage, hearing loss and nasal drainage  Eyes:  Negative for eye discharge and vision loss  Cardiovascular:  Negative for chest pain, sob, irregular heartbeat/palpitations  Respiratory:  Negative for cough, dyspnea and wheezing  Gastrointestinal: Denies seeing any blood loss today  Genitourinary:  Negative for dysuria and hematuria  Endocrine:  Negative for abnormal sleep patterns, increased activity, polydipsia and polyphagia  Hema/Lymph:  Negative for easy bleeding and easy bruising  Integumentary:  Negative for pruritus and rash  Musculoskeletal:  Negative for bone/joint symptoms  Neurological:  Negative for gait disturbance  Psychiatric:  Negative for inappropriate interaction and psychiatric symptoms      Vitals:    24 1722   BP: 127/53   Pulse:    Resp:    Temp:            PHYSICAL EXAM:   Constitutional: appears well hydrated alert and responsive no acute distress noted  Head/Face: normocephalic  Eyes/Vision: normal extraocular motion is intact  Nose/Mouth/Throat:mucous membranes are moist and no oral lesions are noted  Neck/Thyroid: neck is supple without adenopathy  Lymphatic: no abnormal cervical, supraclavicular adenopathy is noted  Respiratory:  lungs are clear to auscultation bilaterally, normal respiratory effort  Cardiovascular: regular rate and rhythm no murmurs, gallups, or rubs  Abdomen: soft, non-tender, non-distended, BS normal  Vascular: well perfused femoral, and pedal pulses normal  Skin/Hair: no unusual rashes present, no abnormal bruising noted  Back/Spine: no abnormalities noted  Musculoskeletal:  full ROM all extremities good strength  no deformities  Extremities: no edema, cyanosis  Neurological:  Grossly normal    Results:     Laboratory Data:  Lab Results   Component Value Date    WBC 12.3 (H) 08/12/2024    HGB 9.4 (L) 08/12/2024    HCT 29.0 (L) 08/12/2024    .0 08/12/2024    CREATSERUM 6.13 (H) 08/12/2024    BUN 46 (H) 08/12/2024     08/12/2024    K 4.3 08/12/2024     08/12/2024    CO2 28.0 08/12/2024     (H) 08/12/2024    CA 9.5 08/12/2024    ALB 3.2 08/09/2024    ALKPHO 43 (L) 08/09/2024    BILT 0.3 08/09/2024    TP 5.6 (L) 08/09/2024    AST 17 08/09/2024    ALT 16 08/09/2024    PTT 30.1 08/09/2024    INR 1.45 (H) 08/09/2024    T4F 0.9 08/31/2022    TSH 2.844 07/04/2024     (H) 07/30/2024    PSA 2.94 10/20/2021    ESRML 10 06/16/2024    CRP 1.30 (H) 06/16/2024    MG 1.7 08/01/2024    PHOS 5.0 08/01/2024    TROP <0.045 07/25/2019     08/05/2023    B12 672 07/04/2024       Imaging:  @KumoGING@   [place] ABDOMEN (1 VIEW) (CPT=74018)    Result Date: 8/12/2024  CONCLUSION:   The capsule device overlies the right lower quadrant, possibly terminal ileum or cecum.  No radiographic evidence of acute abnormality in the abdomen or pelvis.  Additional chronic or incidental findings are described in the body of this report.    Dictated by (CST): Florentino Mari MD on 8/12/2024 at 1:23 PM     Finalized by (CST): Florentino Mari MD on 8/12/2024 at 1:26 PM             ASSESSMENT/PLAN:   Assessment       1 ESRD having dialysis today for fluid as needed    GI bleeding   Had capsule enteroscopy today no active bleeding currently holding blood thinners  Eliquis and aspirin  Hemoglobin 9.4 stable on Epogen    Discussed plans with patient and wife Stephy  8/12/2024  José Callahan MD

## 2024-08-13 NOTE — PAYOR COMM NOTE
--------------  ADMISSION REVIEW     Payor: [employer]  Subscriber #:  [id number]  Authorization Number: [id number]    Admit date: 8/9/24  Admit time:  6:26 PM       REVIEW DOCUMENTATION:  ED Provider Notes signed by Amber Tanner MD at 8/9/2024  5:04 PM      Patient Seen in: [facility] Emergency Department    History     Chief Complaint   Patient presents with    GI Bleeding     Stated Complaint: Bleeding    HPI  79-year-old male with atrial fibrillation on Eliquis, CAD status post PCI in 2024, heart failure, ESRD on hemodialysis Monday Wednesday Friday, who presents for evaluation of GI bleeding.  He notes bright red stool per rectum about 2-3 times today.  He did vomit today but is unsure the contents of the emesis.  No fevers.  He has some lower abdominal pain.  He took Eliquis this morning.    Past Medical History:    Anemia    Asthma (HCC)    Back problem    BPH (benign prostatic hyperplasia)    Calculus of kidney    Cataract    Coronary atherosclerosis    Diabetes (HCC)    ESRD (end stage renal disease) on dialysis (HCC)    Essential hypertension    High blood pressure    High cholesterol    History of blood transfusion    Hyperlipidemia    Neuropathy    hands and feet    KRAIG on CPAP    Renal disorder    Sleep apnea    Visual impairment    glasses    Vocal cord paralysis, unilateral partial     Past Surgical History:   Procedure Laterality Date    Appendectomy      1981    Back surgery      Neck/back - 1998    Capsule endoscopy - internal referral      Cataract      12/2021 and 1/2022    Cath bare metal stent (bms)      Colonoscopy      Colonoscopy N/A 01/25/2021    Procedure: COLONOSCOPY;  Surgeon: Michael Gautam MD;  Location: [facility] ENDO    Colonoscopy N/A 06/03/2024    Procedure: COLONOSCOPY;  Surgeon: Gabriel Saldana MD;  Location: [facility] ENDOSCOPY    Colonoscopy N/A 06/15/2024    Procedure: COLONOSCOPY;  Surgeon: Carlyn Storey MD;  Location: [facility] ENDOSCOPY    Colonoscopy N/A  7/31/2024    Procedure: COLONOSCOPY;  Surgeon: Gabriel Saldana MD;  Location: [facility] ENDOSCOPY    Hand/finger surgery unlisted      Accidental trauma    Spine surgery procedure unlisted      Upper gi endoscopy,diagnosis       Review of Systems  Positive for stated Chief Complaint: GI Bleeding  Other systems are as noted in HPI.  Constitutional and vital signs reviewed.    All other systems reviewed and negative except as noted above.    Physical Exam     ED Triage Vitals [08/09/24 1345]   BP 97/41   Pulse 78   Resp 20   Temp 97.1 °F (36.2 °C)   Temp src Temporal   SpO2 100 %   O2 Device None (Room air)     Vital Signs  BP: 112/51  Pulse: 70  Resp: 12  Temp: 97.1 °F (36.2 °C)  Temp src: Temporal  MAP (mmHg): 68    Oxygen Therapy  SpO2: 100 %  O2 Device: Nasal cannula (placed on 1L NC for comfort, pt oxygenating at 98% in ED)    Physical Exam  Vitals and nursing note reviewed.   Constitutional:       Appearance: He is well-developed. He is ill-appearing.   HENT:      Head: Normocephalic and atraumatic.   Eyes:      Extraocular Movements: Extraocular movements intact.   Cardiovascular:      Rate and Rhythm: Normal rate and regular rhythm.      Heart sounds: Normal heart sounds.   Pulmonary:      Effort: Pulmonary effort is normal.      Breath sounds: Normal breath sounds.   Abdominal:      General: There is no distension.      Palpations: Abdomen is soft.      Tenderness: There is no abdominal tenderness.   Genitourinary:     Comments: ED RN Paulina present at bedside during exam.  Normal rectal tone, drew maroon colored blood present in the rectal vault  Musculoskeletal:         General: Normal range of motion.      Cervical back: Normal range of motion.   Skin:     General: Skin is warm.   Neurological:      Mental Status: He is alert.      Comments: No focal deficits     Differential diagnose includes but is not limited to AVM, diulafoy lesion, anemia, less likely brisk upper GI bleed      ED Course     Labs Reviewed    BASIC METABOLIC PANEL (8) - Abnormal; Notable for the following components:       Result Value    Glucose 143 (*)     CO2 33.0 (*)     BUN 33 (*)     Creatinine 3.16 (*)     Calculated Osmolality 296 (*)     eGFR-Cr 19 (*)     All other components within normal limits   CBC WITH DIFFERENTIAL WITH PLATELET - Abnormal; Notable for the following components:    RBC 1.73 (*)     HGB 5.3 (*)     HCT 16.2 (*)     RDW-SD 56.2 (*)     RDW 16.3 (*)     All other components within normal limits   PROTHROMBIN TIME (PT) - Abnormal; Notable for the following components:    PT 18.5 (*)     INR 1.45 (*)     All other components within normal limits   HEPATIC FUNCTION PANEL (7) - Abnormal; Notable for the following components:    Alkaline Phosphatase 43 (*)     Total Protein 5.6 (*)     All other components within normal limits   PTT, ACTIVATED - Normal   TYPE AND SCREEN   ABORH (BLOOD TYPE)   ANTIBODY SCREEN   PREPARE RBC     EKG at 1445    Rate, intervals and axes as noted on EKG Report.  Rate: 69  Rhythm: Sinus Rhythm  Reading: No STEMI, no ectopy    CTA ABD/PEL (CPT=74174)  Result Date: 8/9/2024  CONCLUSION:   No active contrast extravasation.  No intra-abdominal or intrapelvic hematoma.  Nondilated fluid-filled loops of small bowel are nonspecific but can be seen with enteritis. Liquid stool seen throughout the colon suggestive of diarrhea.  Overall constellation of findings are suggestive of a mild diffuse enterocolitis, likely from infectious or inflammatory etiology.  No obstruction.   Peripheral interstitial and reticular opacities within the bilateral lower lungs is partially seen suggestive of developing fibrotic changes or interstitial lung disease.  No honeycombing is seen within the visualized lower lungs.       MDM      Admission disposition: 8/9/2024  3:29 PM    Medical Decision Making  On arrival to the ED, patient is hypotensive, 500 mL fluid bolus given.  Hemoglobin today concerning at 5.3.  Given concern for  active bleeding, 3 units PRBC transfusion ordered.  BP stabilized thereafter.  Per my independent interpretation of CT abdomen pelvis, no active contrast extravasation.  I discussed with Dr. Bustamante with GI who will plan for capsule endoscopy tomorrow once patient has stabilized hemodynamically.  Discussed with Dr. Julio for cardiology who evaluated patient at bedside.  Discussed with Dr. Musa Sheppard for nephrology consultation. D/w Dr Amaro for admission.    Problems Addressed:  Rectal bleeding: complicated acute illness or injury with systemic symptoms that poses a threat to life or bodily functions    Amount and/or Complexity of Data Reviewed  External Data Reviewed: notes.     Details: Reviewed EGD and colonoscopy in June 2024 which showed AVM versus Dula Suraj the transverse colon which was cauterized and clipped.  Reviewed EGD report in mid June 2024 which showed 1 red spot that was clipped again.  Reviewed colonoscopy report from 7/31/2024 which showed no active bleeding.    Labs: ordered. Decision-making details documented in ED Course.  Radiology: ordered and independent interpretation performed. Decision-making details documented in ED Course.  ECG/medicine tests: ordered and independent interpretation performed. Decision-making details documented in ED Course.  Discussion of management or test interpretation with external provider(s): As above    Risk  Drug therapy requiring intensive monitoring for toxicity.  Decision regarding hospitalization.    Disposition and Plan     Clinical Impression:  1. Rectal bleeding       Disposition:  Admit  8/9/2024  3:29 pm    Hospital Problems       Present on Admission  Date Reviewed: 7/31/2024            ICD-10-CM Noted POA    * (Principal) Rectal bleeding K62.5 6/13/2024 Unknown       Amber Tanner MD on 8/9/2024  5:04 PM      HISTORY AND PHYSICAL EXAMINATION   CHIEF COMPLAINT:  Gastrointestinal bleed and posthemorrhagic anemia.     HISTORY OF PRESENT ILLNESS:   Patient is a 79-year-old  male with history of 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  Presented today to the emergency department for evaluation of maroonish-colored bowel movements for the last 3 to 4 days with generalized weakness and low blood pressure.  He finished dialysis and noted to have systolic blood pressure of 70.  In the emergency room, blood pressure 102/41.  CBC showed hemoglobin of 5.3, which has dropped from baseline.  INR 1.45.  Chemistry still pending.  Patient was initiated on blood transfusion.  CT scan of the abdomen and pelvis still pending.        Temperature 97.1, pulse 70, respiratory rate 21, blood pressure 102/41, pulse ox 100% on room air       ASSESSMENT:    1.       Recurrent gastrointestinal bleed.  Possible arteriovenous malformations, ascending colon, versus small bowel arteriovenous malformations.  2.       Posthemorrhagic anemia.  3.       End-stage renal disease, on hemodialysis.   4.       Hypotension and hypovolemia.  5.       Diabetes mellitus type 2.     PLAN:  Patient will be admitted to telemetry floor.  Monitor hemodynamic status.  Blood transfusion.  Gastroenterology, cardiology, and nephrology consults.  Hold aspirin and Plavix.  Hold blood pressure medications.  Further recommendations to follow.   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     Admitted 6/2024 and again 2 weeks ago with similar presentation of maroon colored stools.  EGD/Colonoscopy 6/2024 with unremarkable EGD, Colonoscopy with bleeding AVM vs Dieulafoy in the Hepatic Flexure s/p APC.  Had repeat EGD/Colonoscopy 7/31/24 with again normal EGD and Colonoscopy with evidence of the previously  placed clip in the hepatic flexure region.  Mucosa that it was attached to was erythematous, however no bleeding was noted.  This area was APC'd.  Discussed at that time that patient would be considered for Watchman given recurrent bleeding.  Last video capsule endoscopy 2016 for evaluation of CHELSEY.  Presents with Hgb 5.3 and on last discharge 10.5.  CT Angio negative for active bleeding.  INR 1.45.   BUN 33 and Cr 3.16.      Blood pressure 108/55, pulse 70, temperature 97.1 °F (36.2 °C), temperature source Temporal, resp. rate 19, height 162.6 cm (5' 4\"), weight 154 lb (69.9 kg), SpO2 100%.     Component Value Date     WBC 8.2 08/09/2024     HGB 5.3 (LL) 08/09/2024     HCT 16.2 (L) 08/09/2024     .0 08/09/2024     CREATSERUM 3.16 (H) 08/09/2024     BUN 33 (H) 08/09/2024      08/09/2024     K 3.9 08/09/2024      08/09/2024     CO2 33.0 (H) 08/09/2024      (H) 08/09/2024     CA 8.7 08/09/2024     ALB 3.2 08/09/2024     ALKPHO 43 (L) 08/09/2024     TP 5.6 (L) 08/09/2024     AST 17 08/09/2024     ALT 16 08/09/2024     PTT 30.1 08/09/2024     INR 1.45 (H) 08/09/2024     PTP 18.5 (H) 08/09/2024       Impression:   79 year old male with a history of ESRD on HD, KRAIG, HTN, HLD,  Afib on Eliquis, 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     Rectal Bleeding;  Hx Colon AVM:  Afib on Eliquis:  - Patient presents back with similar to prior admissions with rectal bleeding of maroon colored stools, lack of abdominal pain, nausea, emesis, weight loss.  Admitted 6/2024 and again 2 weeks ago.  EGD/Colonoscopy 6/2024 with unremarkable EGD, Colonoscopy with bleeding AVM vs Dieulafoy in the Hepatic Flexure s/p APC.  Had repeat EGD/Colonoscopy 7/31/24 with again normal EGD and Colonoscopy with evidence of the previously placed clip in the hepatic flexure region.  Mucosa that it was attached to was erythematous, however no bleeding was noted.    - We discussed options of repeat  Colonoscopy given known bleed in the hepatic flexure but low yield given repeat Colonoscopy and further treatment.  Patient and wife also defer repeat Colonoscopy.  EGD not recommended given negative exam x 2.     Plan:  - Will proceed with VCE tomorrow to evaluate for small bowel etiology of anemia  - Ideally needs to be considered for Watchman Procedure and eventual discontinuation of anticoagulation   - Transfuse to goal Hgb > 8.0 given cardiac history  - Mag-Citrate Ordered for bowel prep for capsule endoscopy  - NPO after MN  Corky Bustamante MD  2024    CARDIOLOGY CONSULT   Chief Complaint   Patient presents with    GI Bleeding      REASON FOR CONSULTATION:    atrial fib, anemia     History PAF, CAD s/p PCI circ  has had multiple admissions for GI bleed. Last was 24. Had EGD/colonoscopy then: not revealing. Eliquis and ASA resumed a week ago. Saw Dr Smith for LAAO device eval yesterday. CTA DEANA scheduled in 2 weeks.      Now with dyspnea and BRBPR. Emesis earlier. No chest pain. Hgb 5.3. ECG SR with TWI AVL only. Took eliquis this morning.     /43   Pulse 68   Temp 97.1 °F (36.2 °C) (Temporal)   Resp 11   Ht 5' 4\" (1.626 m)   Wt 154 lb (69.9 kg)   SpO2 100%   BMI 26.43 kg/m²   Temp (24hrs), Av.1 °F (36.2 °C), Min:97.1 °F (36.2 °C), Max:97.1 °F (36.2 °C)     TELE: SR     ASSESSMENT/PLAN:      IMPRESSIONS:  Recurrent GI bleeding with Hgb 5.3  PAF, currently SR  CAD s/p Ramin circ 24  Chronic diastolic CHF  ESRD on HD  HTN  HL, on statin     PLAN:  Saw Dr Smith 24 for watchman eval. To get CTA DEANA 24 with return visit to see structural NP on 24  Hold DOAC and ASA for now.   Records from admission 1-2 weeks ago reviewed.   To get PRBC transfusions. Consider lasix/dialysis somewhere in between  Discussed with wife at bedside in ER     Emerson Julio MD   2024  4:02 PM     8/10/2024:  CARDIOLOGY PROGRESS NOTE   Denies CP/SOB     /60 (BP Location:  Left arm)   Pulse 73   Temp 97.4 °F (36.3 °C) (Temporal)   Resp 16   Ht 64\"   Wt 158 lb 4.6 oz (71.8 kg)   SpO2 93%   BMI 27.17 kg/m²   Temp (24hrs), Av.4 °F (36.3 °C), Min:96.9 °F (36.1 °C), Max:98.5 °F (36.9 °C)    8/10/2024 0600      Gross per 24 hour   Intake 1816.67 ml   Output 575 ml   Net 1241.67 ml       Lab 24  1441 08/10/24  0354   * 99   BUN 33* 37*   CREATSERUM 3.16* 3.99*   CA 8.7 9.3    139   K 3.9 4.5    102   CO2 33.0* 32.0     Lab 08/10/24  0354   RBC 2.79*   HGB 8.5*   HCT 25.5*   MCV 91.4   MCH 30.5   MCHC 33.3   RDW 15.4*   NEPRELIM 5.55   WBC 8.0   .0*     PLAN:  Continue to hold antiplatelets and apixaban for today with severe anemia s/p 4u pRBCs. He understands the risks of stent thrombosis and stroke while off these agents but given recent significant bleeding/anemia must hold for now. Ideally restart antiplatelet in the near future pending clinical course.   Saw Dr Smith 24 for watchman eval. To get CTA DEANA 24 with return visit to see structural NP on 24  Appreciate ongoing GI evaluation.   Cardiology to follow.   Matt Narayanan MD   8/10/2024  7:09 AM     Nephrology Consult Note   Reason for Consultation:   ESRD on HD     History of Present Illness:   Patient is a 79 year old male with past medical history of T2DM, HTN, HLD, ESRD on HD MWF, CAD s/p PCI (2024), A.fib on Eliquis, HFpEF, KRAIG, BPH, history of recurrent gastrointestinal bleeding, who presented with BRBPR for the last 3-4 days. He had 3 episodes yesterday and vomiting so he decided to come to ED.   He is found to have Hb of 5.3. He received 3 units of pRBCs. Hb improved to 8.5 today. He is still have BM with maroon stools.      He had 2 hours of HD on Monday and BP dropped to 70s toward the end.     Positive for fatigue     Temp:  [96.9 °F (36.1 °C)-98.5 °F (36.9 °C)] 97.4 °F (36.3 °C)  Pulse:  [] 105  Resp:  [11-22] 14  BP: ()/(33-83) 158/83  SpO2:  [93  %-100 %] 96 %  SpO2: 96 %     Lab 24  1441 24  1951 08/10/24  0103 08/10/24  0354   RBC 1.73*  --   --  2.79*   HGB 5.3* 6.7* 8.0* 8.5*   HCT 16.2* 20.5* 24.2* 25.5*   MCV 93.6  --   --  91.4   NEPRELIM 6.29  --   --  5.55   WBC 8.2  --   --  8.0   .0  --   --  147.0*      Lab 24  1441 08/10/24  0354   * 99   BUN 33* 37*   CREATSERUM 3.16* 3.99*   CA 8.7 9.3   ALB 3.2  --     139   K 3.9 4.5    102   CO2 33.0* 32.0     Impression and Recommendations:      79 year old male with past medical history of T2DM, HTN, HLD, ESRD on HD MWF, CAD s/p PCI (2024), A.fib on Eliquis, HFpEF, KRAIG, BPH, history of recurrent gastrointestinal bleeding, who presented with BRBPR for the last 3-4 days.      GIB:   Hb 5.3 on admission. He received 3 units of pRBCs. Hb improved to 8.5.  Epogen 10,000 units 3x/week ordered.   GI following.      ESRD HD MWF:   HD yesterday. will monitor for the need of additional HD.  He makes urine. Will start Bumex 2 mg IV daily      CAD/A.fib:   ASA and Eliquis on hold.   Cards following.  Mirta Redman MD  8/10/2024    2024:  CARDIOLOGY PROGRESS NOTE   Had video capsule yesterday   /59 (BP Location: Left arm)   Pulse 75   Temp 97.7 °F (36.5 °C) (Oral)   Resp 18   Ht 64\"   Wt 158 lb 4.6 oz (71.8 kg)   SpO2 98%   BMI 27.17 kg/m²   Temp (24hrs), Av.6 °F (36.4 °C), Min:97.1 °F (36.2 °C), Max:98.3 °F (36.8 °C)     2024 0600      Gross per 24 hour   Intake 400 ml   Output 950 ml   Net -550 ml     Lab 24  0547   RBC 2.92*   HGB 9.0*   HCT 26.6*   MCV 91.1   MCH 30.8   MCHC 33.8   RDW 15.2*   NEPRELIM 7.78*   WBC 9.7   .0     PLAN:  Continue to hold antiplatelets and apixaban for today with recent severe anemia and pRBC requirement. Had video capsule endoscopy. He understands the risks of stent thrombosis and stroke while off these agents but given recent significant bleeding/anemia must hold for now. Ideally restart  antiplatelet in the next 1-2 days pending clinical course.   Saw Dr Smith 8/8/24 for watchman eval. To get CTA DEANA 8/22/24 with return visit to see structural NP on 8/27/24  Appreciate ongoing GI evaluation.   Continue amlodipine and carvedilol  Restart statin  Cardiology to follow.   Matt Narayanan MD   8/11/2024 11:11 AM     Gastroenterology   Video capsule pending     Blood pressure 154/59, pulse 75, temperature 97.7 °F (36.5 °C), temperature source Oral, resp. rate 18, height 162.6 cm (5' 4\"), weight 158 lb 4.6 oz (71.8 kg), SpO2 98%.     WBC 9.7 08/11/2024      HGB 9.0 (L) 08/11/2024     HCT 26.6 (L) 08/11/2024     .0 08/11/2024     Plan:  - Video Capsule results pending read today  - Ideally needs to be considered for Watchman Procedure and eventual discontinuation of anticoagulation. Following with Dr. Smith and plan for CTA DEANA and follow up with Structural NP this month.  - Transfuse to goal Hgb > 8.0 given cardiac history  - Monitor for overt bleeding  Corky Bustamante M.D.  8/11/2024 12:41 PM     8/12/2024:  Capsule Endoscopy Report   Findings:   - Capsule retention in the stomach for the majority of the exam.  Patient and staff instructed to have patient ambulate and sit up in chair but patient noted to be laying down several hours after swallowing on day of procedure.    - No evidence of old or fresh blood in the in limited views of the duodenum     Impression:   Incomplete exam  No active bleeding on limited exam     Recommendation:   KUB to confirm capsule passage to cecum  Resume Eliquis however agree to discuss with Cardiology regarding decreasing dosage  Corky Bustamante M.D.  8/12/2024 11:22 AM     Nephrology   Feeling okay on dialysis       08/12/24 1722    BP: 127/53     Component Value Date     WBC 12.3 (H) 08/12/2024     HGB 9.4 (L) 08/12/2024     HCT 29.0 (L) 08/12/2024     .0 08/12/2024     CREATSERUM 6.13 (H) 08/12/2024     BUN 46 (H) 08/12/2024      08/12/2024      K 4.3 08/12/2024      08/12/2024     CO2 28.0 08/12/2024      (H) 08/12/2024     CA 9.5 08/12/2024     ASSESSMENT/PLAN:      Assessment  1 ESRD having dialysis today for fluid as needed     GI bleeding     Had capsule enteroscopy today no active bleeding currently holding blood thinners  Eliquis and aspirin  Hemoglobin 9.4 stable on Epogen     Discussed plans with patient and wife Stephy  8/12/2024  José Callahan MD      MEDICATIONS ADMINISTERED:  Medications 08/09/24 08/10/24 08/11/24 08/12/24 08/13/24   amLODIPine (Norvasc) tab 5 mg  Dose: 5 mg  Freq: Daily Route: OR  Start: 08/10/24 1400     1603 MW-Given       1000 MS-Given       0930 CM-Given [C]       0930         bumetanide (Bumex) 0.25 MG/ML injection 2 mg  Dose: 2 mg  Freq: Daily Route: IV  Start: 08/10/24 1445     1603 MW-Given       1000 MS-Given       0930 CM-Given [C]       0930         carvedilol (Coreg) tab 25 mg  Dose: 25 mg  Freq: 2 times daily Route: OR  Start: 08/10/24 1400     (1400 MW)-Not Given   2048 MA-Given      1000 MS-Given   2143 PN-Given      0900 CM-Hold [C]       0007 PN-Given   0900 2100         epoetin donna (Epogen, Procrit) 51205 UNIT/ML injection 10,000 Units  Dose: 10,000 Units  Freq: Once per day on Monday Wednesday Friday Route: SC  Start: 08/10/24 1100   Admin Instructions:   Keep refrigerated   Order specific questions:        1419 MW-Given        1600 CM-Given [C]          fluticasone-salmeterol (Advair Diskus) 250-50 MCG/ACT inhaler 1 puff  Dose: 1 puff  Freq: 2 times daily Route: IN  Start: 08/10/24 1400   Admin Instructions:   RN to administer  RT To Administer First Dose.        2119 KK-Given      1000 MS-Given   2142 PN-Given      0900 CM-Given   (2100 SB)-Not Given      0900 2100        hydrocortisone 1 % ointment  Freq: 3 times daily Route: TOP  Start: 08/10/24 2100   Order specific questions:        2048 MA-Given       1000 MS-Given   1600 MS-Given     (2100 PN)-Not Given       (0900 CM)-Not  Given   (1600 CM)-Not Given     (2100 PN)-Not Given       0900   1600     2100         insulin aspart (NovoLOG) 100 Units/mL FlexPen 1-5 Units  Dose: 1-5 Units  Freq: 3 times daily with meals Route: SC  Start: 08/09/24 1845   Admin Instructions:   CORRECTION FACTOR - LOW DOSE  Continue to give correction insulin even if NPO  DO NOT HOLD OR ALTER INSULIN DOSE WITHOUT A PHYSICIAN ORDER    Give 1 unit for blood glucose 160-200 mg/dL  Give 2 units for blood glucose 201-240 mg/dL  Give 3 units for blood glucose 241-280 mg/dL  Give 4 units for blood glucose 281-320 mg/dL  Give 5 units for blood glucose 321-360 mg/dL  Call physician if blood glucose is greater than 360 mg/dL with time and last dose of correction insulin given.    1845       (0800 MW)-Not Given   (1419 MW)-Not Given     1845 MW-Given       (0800 MS)-Not Given   (1200 MS)-Not Given [C]     (1700 MS)-Not Given       (0800 CM)-Not Given [C]   (1200 CM)-Not Given [C]     1834 CM-Given       0800   1200     1700         ipratropium-albuterol (Duoneb) 0.5-2.5 (3) MG/3ML inhalation solution 3 mL  Dose: 3 mL  Freq: Once Route: Nebulization  Start: 08/09/24 1541 End: 08/09/24 1553   Order specific questions:       1553 TW-Given             magnesium citrate 1.745 GM/30ML oral solution 296 mL  Dose: 296 mL  Freq: Once Route: OR  Start: 08/10/24 0400 End: 08/10/24 0636     0636 RK-Given            ondansetron (Zofran) 4 MG/2ML injection 4 mg  Dose: 4 mg  Freq: Once Route: IV  Start: 08/09/24 1512 End: 08/09/24 1515    1515 KB-Given             pantoprazole (Protonix) DR tab 40 mg  Dose: 40 mg  Freq: Before breakfast Route: OR  Start: 08/10/24 1400   Admin Instructions:   Do not crush     1603 MW-Given       0541 MA-Given       0654 PN-Given       0700         phenylephrine-min oil-markie (Formula R) 0.25-14-74.9 % rectal ointment  Freq: 2 times daily Route: RE  Start: 08/10/24 2100     2048 MA-Given       1000 MS-Given   (2100 PN)-Not Given      (0900 CM)-Not Given    (2100 PN)-Not Given      0900 2100        sodium chloride 0.9 % IV bolus 500 mL  Dose: 500 mL  Freq: Once Route: IV  Last Dose: Stopped (08/09/24 1539)  Start: 08/09/24 1439 End: 08/09/24 1539    1443 KB-New Bag   1539 KB-Stopped              acetaminophen (Tylenol Extra Strength) tab 500 mg  Dose: 500 mg  Freq: Every 4 hours PRN Route: OR  PRN Reason: Fever  PRN Comment: equal to or greater than 100.4  Start: 08/09/24 1839   Admin Instructions:   do not initiate oral therapy until 6-8 hours after the last IV acetaminophen dose if IV acetaminophen was used previously     2048 MA-Given            albuterol (Ventolin HFA) 108 (90 Base) MCG/ACT inhaler 2 puff  Dose: 2 puff  Freq: Every 4 hours PRN Route: IN  PRN Reason: Wheezing  Start: 08/10/24 0852   Admin Instructions:   RT To Administer First Dose.     0938 EG-Given        1048 CM-Given          hydrALAzine (Apresoline) 20 mg/mL injection 5 mg  Dose: 5 mg  Freq: Every 4 hours PRN Route: IV  PRN Reason: Increased blood pressure  Start: 08/10/24 0035   Admin Instructions:   For SBP > 160     0213 RK-Hold [C]   0321 RK-Given      0011 MA-Return to Cabinet   0329 MA-Given     2142 PN-Given           iopamidol 76% (ISOVUE-370) injection for power injector  Dose: 80 mL  Freq: IMG once as needed Route: IV  PRN Reason: contrast  Start: 08/09/24 1503 End: 08/09/24 1504    1504 BF-Given             ipratropium-albuterol (Duoneb) 0.5-2.5 (3) MG/3ML inhalation solution 3 mL  Dose: 3 mL  Freq: Every 4 hours PRN Route: Nebulization  PRN Reasons: Wheezing,Shortness of Breath  Start: 08/10/24 0852   Order specific questions:        1230 EG-Given        1528 CF-Given          ondansetron (Zofran) 4 MG/2ML injection 4 mg  Dose: 4 mg  Freq: Every 6 hours PRN Route: IV  PRN Reasons: Nausea,vomiting  Start: 08/09/24 1839   Admin Instructions:   Default antiemetic sequence (unless otherwise preferred by patient):  1. ondansetron (Zofran) 2. metoclopramide (Reglan)  Wait 15 minutes  before proceeding to next medication in sequence.  Follow therapeutic duplication policy.       1203 CM-Given          traMADol (Ultram) tab 50 mg  Dose: 50 mg  Freq: Every 12 hours PRN Route: OR  PRN Reason: moderate pain  Start: 08/10/24 1354   Admin Instructions:   Max dose 400 mg/day      0330 MA-Given           zolpidem (Ambien) tab 5 mg  Dose: 5 mg  Freq: Nightly PRN Route: OR  PRN Reason: Sleep  Start: 08/10/24 2013 End: 08/11/24 1230 2048 MA-Given       1230-D/C'd           Vitals (last day)       Date/Time Temp Pulse Resp BP SpO2 Weight O2 Device O2 Flow Rate (L/min) [facility]    08/12/24 2350 98.5 °F (36.9 °C) -- 18 133/49 96 % -- None (Room air) -- DP    08/12/24 2000 -- 70 -- -- -- -- -- -- KD    08/12/24 1722 -- -- -- 127/53 -- -- -- -- CM    08/12/24 1508 97.9 °F (36.6 °C) 77 16 179/61 100 % -- None (Room air) -- CM    08/12/24 1039 98 °F (36.7 °C) 71 18 167/56 99 % -- None (Room air) -- CM    08/12/24 0652 97.7 °F (36.5 °C) 73 20 156/71 97 % -- None (Room air) -- PN    08/12/24 0230 -- 75 -- -- -- -- -- -- KM     08/11/24 2134 97.9 °F (36.6 °C) 71 18 175/61 Abnormal  95 % -- None (Room air) -- PN   08/11/24 1904 -- 75 -- -- -- -- -- -- GT   08/11/24 1527 97.9 °F (36.6 °C) -- 18 158/62 98 % -- None (Room air) -- MS   08/11/24 0900 97.7 °F (36.5 °C) -- 18 154/59 98 % -- None (Room air) -- MS   08/11/24 0540 -- -- -- 158/68 -- -- -- -- MA   08/11/24 0408 -- 75 -- -- 97 % -- -- --    08/11/24 0329 98.3 °F (36.8 °C) 71 18 168/63 Abnormal  -- -- -- -- MA   08/11/24 0015 -- -- -- 155/64 -- -- -- -- MA   08/10/24 2119 -- 69 -- -- 100 % -- -- --    08/10/24 2005 97.7 °F (36.5 °C) 70 20 180/62 Abnormal  100 % -- None (Room air) -- MA   08/10/24 1944 -- 72 -- -- -- -- -- -- GT   08/10/24 1900 -- 76 23 169/57 Abnormal  99 % -- None (Room air) --    08/10/24 1800 -- 74 15 170/60 Abnormal  97 % -- None (Room air) --    08/10/24 1600 97.3 °F (36.3 °C) 76 20 159/64 97 % -- None (Room air) --    08/10/24 1415  -- 68 13 166/59 Abnormal  99 % -- None (Room air) -- MW   08/10/24 1200 97.1 °F (36.2 °C) 73 17 155/60 100 % -- None (Room air) -- MW   08/10/24 1000 -- 74 22 153/60 98 % -- None (Room air) -- MW   08/10/24 0938 -- -- -- -- -- -- None (Room air) -- EG   08/10/24 0807 97.7 °F (36.5 °C) -- -- -- -- -- -- -- MW   08/10/24 0807 -- 105 14 158/83 96 % -- None (Room air) -- AC   08/10/24 0700 -- 104 12 158/70 98 % -- -- -- MW   08/10/24 0600 -- 73 16 159/60 93 % -- None (Room air) -- RK   08/10/24 0500 97.4 °F (36.3 °C) 76 18 159/56 99 % -- CPAP -- RK   08/10/24 0400 -- 73 17 162/57 Abnormal  99 % -- CPAP -- RK   08/10/24 0200 -- 70 16 156/66 96 % -- CPAP -- RK   08/10/24 0100 -- 69 14 160/59 99 % -- CPAP -- RK   08/10/24 0000 98.3 °F (36.8 °C) 71 20 175/62 Abnormal  100 % -- -- -- RK   08/09/24 2300 -- 69 20 162/62 Abnormal  100 % -- -- -- RK   08/09/24 2300 -- -- -- -- -- -- CPAP -- MC   08/09/24 2145 97.8 °F (36.6 °C) 75 18 165/65 Abnormal  97 % -- -- -- RK   08/09/24 2130 97.8 °F (36.6 °C) 74 18 151/54 96 % -- -- -- RK   08/09/24 2100 -- 75 14 154/57 97 % -- -- -- RK   08/09/24 2000 -- 73 15 139/43 98 % -- -- -- RK   08/09/24 1900 -- 74 20 140/54 99 % -- None (Room air) -- RK   08/09/24 1844 -- -- -- -- -- 158 lb 4.6 oz (71.8 kg) -- -- DG   08/09/24 1839 98.5 °F (36.9 °C) 75 19 136/54 98 % -- None (Room air) -- DG   08/09/24 1815 -- 72 19 129/54 100 % -- None (Room air) -- KB   08/09/24 1800 97.6 °F (36.4 °C) 77 20 128/61 98 % -- None (Room air) -- KB   08/09/24 1745 96.9 °F (36.1 °C) 73 17 114/52 95 % -- None (Room air) -- KB   08/09/24 1730 97.1 °F (36.2 °C) -- -- -- -- -- -- -- KB   08/09/24 1715 97.3 °F (36.3 °C) 73 16 116/54 95 % -- None (Room air) -- KB   08/09/24 1700 97 °F (36.1 °C) 71 17 112/46 100 % -- None (Room air) -- KB   08/09/24 1645 97.1 °F (36.2 °C) 72 22 115/50 99 % -- None (Room air) -- KB   08/09/24 1630 96.9 °F (36.1 °C) -- -- -- -- -- -- -- KB   08/09/24 1615 97 °F (36.1 °C) -- 16 113/46 -- -- --  --    08/09/24 1600 -- 68 11 109/43 100 % -- None (Room air) --    08/09/24 1555 96.9 °F (36.1 °C) 70 15 109/43 100 % -- -- --    08/09/24 1545 -- 70 12 112/51 100 % -- Nasal cannula --    08/09/24 1530 -- 70 19 108/55 100 % -- None (Room air) --    08/09/24 1515 -- 70 21 102/41 100 % -- None (Room air) --    08/09/24 1500 -- 70 16 107/33 100 % -- None (Room air) --    08/09/24 1445 -- 69 16 96/42 100 % -- -- --    08/09/24 1430 -- 70 20 70/54 Abnormal  100 % -- -- --    08/09/24 1345 97.1 °F (36.2 °C) 78 20 97/41 100 % 154 lb (69.9 kg) None (Room air) -- AR       Blood Transfusion Record       Product Unit Status Volume Start End            Transfuse RBC       24  [id number]  [id number] Stopped 416.67 mL 08/09/24 2125 08/10/24 0045       24  [id number]  [id number] Completed 08/09/24 1842 850 mL 08/09/24 1555 08/09/24 1839             FOR REVIEW/APPROVAL OF INPT ADMISSION

## 2024-08-14 NOTE — PAYOR COMM NOTE
--------------  DISCHARGE REVIEW    Payor: [employer]  Subscriber #:  [id number]  Authorization Number: [id number]    Admit date: 24  Admit time:   6:26 PM  Discharge Date: 2024  1:52 PM     Admitting Physician: Amilcar Amaro MD  Attending Physician:  Lili att. providers found  Primary Care Physician: Wili Parmar MD          Discharge Summary Notes        Discharge Summary signed by Denis Julian MD at 2024  3:04 PM       Author: Denis Julian MD Specialty: HOSPITALIST Author Type: Physician    Filed: 2024  3:04 PM Date of Service: 2024 11:50 AM Status: Signed    : Denis Julian MD (Physician)           [facility]  [facility] of [facility]     DISCHARGE SUMMARY     Ollie Hernández Patient Status:  Inpatient    1945 MRN [id number]   Location [facility] 5SW/SE Attending Denis Julian MD   Hosp Day # 4 PCP Wili Parmar MD     DATE OF ADMISSION: 2024  DATE OF DISCHARGE:  24  DISPOSITION: home  CONDITION ON DISCHARGE: good    DISCHARGE DIAGNOSES:  Acute GI bleed  Anemia, acute blood loss  Atrial fibrillation, paroxysmal  End-stage renal disease on hemodialysis  Hypotension  Hypovolemia  Type 2 diabetes    HISTORY OF PRESENT ILLNESS (COPIED FROM ADMISSION H&P)  Patient is a 79-year-old  male with history of 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 Presented today to the emergency department for evaluation of maroonish-colored bowel movements for the last 3 to 4 days with generalized weakness and low blood pressure. He finished dialysis and noted to have systolic blood pressure of 70. In the emergency room, blood pressure 102/41. CBC showed hemoglobin of 5.3, which has dropped from baseline. INR 1.45. Chemistry still pending. Patient was initiated on blood transfusion. CT scan of the abdomen and pelvis still pending.     HOSPITAL COURSE:  Patient was admitted,  transfused 3 units packed red blood cells.  Hemoglobin responded appropriately.  There is no ongoing bleeding.  Seen by GI.  Due to recent multiple endoscopies, this was not repeated.  He did undergo capsule endoscopy which was negative.  Antiplatelets and anticoagulation were held at the time of admission.  Antiplatelets will be restarted due to his recent stent.  He will restart Eliquis in about 1 week if there is no further bleeding.  Hopefully patient will be able to get his Watchman placed and come off anticoagulation soon.    Patient understands return to the emergency room for increased pain, fever, discharge, shortness of breath, chest pain, new neurologic symptoms, other concerning symptoms.    PHYSICAL EXAM:  Temp:  [97.7 °F (36.5 °C)-98.5 °F (36.9 °C)] 97.7 °F (36.5 °C)  Pulse:  [67-77] 71  Resp:  [16-18] 18  BP: (115-179)/(44-61) 115/44  SpO2:  [95 %-100 %] 96 %  Gen: A+Ox3.  No distress.   HEENT: NCAT, neck supple, no carotid bruit.  CV: RRR, S1S2, and intact distal pulses. No gallop, rub, murmur.  Pulm: Effort and breath sounds normal. No distress, wheezes, rales, rhonchi.  Abd: Soft, NTND, BS normal, no mass, no HSM, no rebound/guarding.   Neuro: Normal reflexes, CN. Sensory/motor exams grossly normal deficit. Coordination  and gait normal.   MS: No joint effusions.  No peripheral edema.  Skin: Skin is warm and dry. No rashes, erythema, diaphoresis.   Psych: Normal mood and affect. Behavior and judgment normal.     DISCHARGE MEDICATIONS     Discharge Medications        ASK your doctor about these medications        Instructions Prescription details   acetaminophen 500 MG Tabs  Commonly known as: Tylenol Extra Strength      Take 1 tablet (500 mg total) by mouth daily as needed for Pain.   Refills: 0     albuterol 108 (90 Base) MCG/ACT Aers  Commonly known as: ProAir HFA      Inhale 2 puffs into the lungs every 4 (four) hours as needed for Wheezing.   Quantity: 3 each  Refills: 3     apixaban 5 MG  Tabs  Commonly known as: Eliquis      Take 1 tablet (5 mg total) by mouth 2 (two) times daily.   Refills: 0     aspirin 81 MG Tbec      Take 1 tablet (81 mg total) by mouth daily.   Quantity: 90 tablet  Refills: 3     atorvastatin 40 MG Tabs  Commonly known as: Lipitor      Take 1 tablet (40 mg total) by mouth nightly.   Quantity: 90 tablet  Refills: 3     Budesonide-Formoterol Fumarate 160-4.5 MCG/ACT Aero  Commonly known as: Symbicort      Inhale 2 puffs into the lungs 2 (two) times daily.   Quantity: 3 each  Refills: 3     carvedilol 25 MG Tabs  Commonly known as: Coreg      Take 1 tablet (25 mg total) by mouth 2 (two) times daily.   Refills: 0     cetirizine 10 MG Tabs  Commonly known as: ZyrTEC      Take 1 tablet (10 mg total) by mouth every other day.   Refills: 0     Cholecalciferol 125 MCG (5000 UT) Tabs  Commonly known as: VITAMIN D-3      Take 1 tablet (5,000 Units total) by mouth 2 (two) times daily.   Refills: 0     Contour Next Test Strp  Generic drug: Glucose Blood      Test 3 times daily   Quantity: 300 each  Refills: 1     Darbepoetin Randell 60 MCG/ML Soln      Inject into the vein as needed.   Refills: 0     escitalopram 5 MG Tabs  Commonly known as: Lexapro      Take 1 tablet (5 mg total) by mouth daily.   Quantity: 90 tablet  Refills: 3     felodipine ER 10 MG Tb24  Commonly known as: Plendil      Take 1 tablet (10 mg total) by mouth daily.   Quantity: 90 tablet  Refills: 3     fluticasone propionate 50 MCG/ACT Susp  Commonly known as: Flonase      2 sprays by Nasal route daily.   Quantity: 48 g  Refills: 3     methocarbamol 500 MG Tabs  Commonly known as: Robaxin      Take 1 tablet (500 mg total) by mouth 2 (two) times daily as needed.   Quantity: 60 tablet  Refills: 1     pantoprazole 40 MG Tbec  Commonly known as: Protonix      TAKE 1 TABLET BY MOUTH EVERY  MORNING BEFORE BREAKFAST   Quantity: 90 tablet  Refills: 3     polyethylene glycol (PEG 3350) 17 g Pack  Commonly known as: Miralax      17 g  Wed, Thurs, Sat   Refills: 0     tetrahydrozoline 0.05 % Soln  Commonly known as: Visine      1 drop 2 (two) times daily as needed.   Refills: 0     Tradjenta 5 MG Tabs  Generic drug: linaGLIPtin      Take 1 tablet (5 mg total) by mouth daily.   Quantity: 90 tablet  Refills: 1     traMADol 50 MG Tabs  Commonly known as: Ultram      Take 1 tablet (50 mg total) by mouth every 12 (twelve) hours as needed for Pain.   Refills: 0              CONSULTANTS  Consultants         Provider   Role Specialty     Emerson Julio MD      Consulting Physician Cardiovascular Diseases     Walker Klein MD      Consulting Physician NEPHROLOGY     Corky Bustamante MD      Consulting Physician GASTROENTEROLOGY     Amilcar Amaro MD      Consulting Physician HOSPITALIST            FOLLOW UP:  Corky Bustamante MD  [address]   [address]  [facility] [address]  [phone]    Follow up  As needed    Wili Parmar MD  [address] [place] [address] [id number] [phone]    Follow up in 1 week(s)  Post Discharge Followup    Raymond Smith MD  [address] [facility]  [address]  [facility] [id number] [phone]    Follow up  Post Discharge Followup    The above plan and follow-up instructions were reviewed with the patient and they verbalized understanding and agreement.  They understand to return to the emergency room for any concerning signs or symptoms.  Greater than 30 minutes spent on discharge.  -----------------------    Hospital Discharge Diagnoses: Acute GI bleed    Lace+ Score: 83  59-90 High Risk  29-58 Medium Risk  0-28   Low Risk.    TCM Follow-Up Recommendation:  LACE > 58: High Risk of readmission after discharge from the hospital.    Electronically signed by Denis Julian MD on 8/13/2024  3:04 PM         REVIEWER COMMENTS

## 2024-08-15 ENCOUNTER — TELEPHONE (OUTPATIENT)
Dept: INTERNAL MEDICINE CLINIC | Facility: CLINIC | Age: 79
End: 2024-08-15

## 2024-08-15 NOTE — TELEPHONE ENCOUNTER
To FD:    OK to double book    Tuesday 8/20   OK to Double Book: 830, 930  Thursday 8/22   OK to double book: 130  Friday 8/23   MD approval: 12

## 2024-08-15 NOTE — TELEPHONE ENCOUNTER
Patient discharged from [facility] on 8/13/24. Per discharge note, patient advised to follow-up with  in 1 week. To  to please advise on scheduling

## 2024-08-15 NOTE — TELEPHONE ENCOUNTER
Tyra from [facility] home health called, patient was admitted today to home care services with nursing and physical therapy    INTERVAL HPI/OVERNIGHT EVENTS:  pt seen and examined  denies n/v/d/c/abd pain  w dec po    MEDICATIONS  (STANDING):  mirtazapine 15 milliGRAM(s) Oral at bedtime  PARoxetine 20 milliGRAM(s) Oral daily  rivaroxaban 15 milliGRAM(s) Oral two times a day with meals    MEDICATIONS  (PRN):      Allergies    No Known Allergies    Intolerances        Review of Systems:    General:  dec po  Eyes:  Good vision, no reported pain  ENT:  No sore throat, pain, runny nose, dysphagia  CV:  No pain, palpitations, hypo/hypertension  Resp:  No dyspnea, cough, tachypnea, wheezing  GI:  No pain, No nausea, No vomiting, No diarrhea, No constipation, No weight loss, No fever, No pruritis, No rectal bleeding, No melena, No dysphagia  :  No pain, bleeding, incontinence, nocturia  Muscle:  No pain, weakness  Neuro:  No weakness, tingling, memory problems  Psych:  No fatigue, insomnia, mood problems, depression  Endocrine:  No polyuria, polydypsia, cold/heat intolerance  Heme:  No petechiae, ecchymosis, easy bruisability  Skin:  No rash, tattoos, scars, edema      Vital Signs Last 24 Hrs  T(C): 36.7 (10 May 2020 04:30), Max: 36.7 (10 May 2020 04:30)  T(F): 98.1 (10 May 2020 04:30), Max: 98.1 (10 May 2020 04:30)  HR: 87 (10 May 2020 04:30) (87 - 99)  BP: 102/63 (10 May 2020 04:30) (102/63 - 107/70)  BP(mean): --  RR: 17 (10 May 2020 04:30) (17 - 18)  SpO2: 95% (10 May 2020 04:30) (92% - 95%)    PHYSICAL EXAM:    General:  nad  HEENT:  NC/AT  Abdomen:  Soft, non-tender, +dt  Extremities:  no edema  Neuro/Psych:  A&Ox3        LABS:        TPro  6.2  /  Alb  2.7<L>  /  TBili  1.1  /  DBili  .50<H>  /  AST  19  /  ALT  24  /  AlkPhos  41  05-09    PT/INR - ( 09 May 2020 06:38 )   PT: 16.6 sec;   INR: 1.46 ratio         PTT - ( 09 May 2020 06:38 )  PTT:30.6 sec      RADIOLOGY & ADDITIONAL TESTS:

## 2024-08-16 NOTE — PROGRESS NOTES
Patient was readmitted into [facility] on 8/9/24, encounter closing.

## 2024-08-20 ENCOUNTER — OFFICE VISIT (OUTPATIENT)
Dept: ENDOCRINOLOGY CLINIC | Facility: CLINIC | Age: 79
End: 2024-08-20

## 2024-08-20 VITALS
BODY MASS INDEX: 27 KG/M2 | WEIGHT: 158 LBS | HEART RATE: 73 BPM | DIASTOLIC BLOOD PRESSURE: 80 MMHG | SYSTOLIC BLOOD PRESSURE: 115 MMHG

## 2024-08-20 DIAGNOSIS — N18.6 TYPE 2 DIABETES MELLITUS WITH ESRD (END-STAGE RENAL DISEASE) (HCC): Primary | ICD-10-CM

## 2024-08-20 DIAGNOSIS — E78.2 MIXED HYPERLIPIDEMIA: ICD-10-CM

## 2024-08-20 DIAGNOSIS — E11.22 TYPE 2 DIABETES MELLITUS WITH ESRD (END-STAGE RENAL DISEASE) (HCC): Primary | ICD-10-CM

## 2024-08-20 LAB
GLUCOSE BLOOD: 201
TEST STRIP LOT #: NORMAL NUMERIC

## 2024-08-20 PROCEDURE — 1159F MED LIST DOCD IN RCRD: CPT | Performed by: INTERNAL MEDICINE

## 2024-08-20 PROCEDURE — 1160F RVW MEDS BY RX/DR IN RCRD: CPT | Performed by: INTERNAL MEDICINE

## 2024-08-20 PROCEDURE — 3074F SYST BP LT 130 MM HG: CPT | Performed by: INTERNAL MEDICINE

## 2024-08-20 PROCEDURE — 82947 ASSAY GLUCOSE BLOOD QUANT: CPT | Performed by: INTERNAL MEDICINE

## 2024-08-20 PROCEDURE — 3079F DIAST BP 80-89 MM HG: CPT | Performed by: INTERNAL MEDICINE

## 2024-08-20 PROCEDURE — 99213 OFFICE O/P EST LOW 20 MIN: CPT | Performed by: INTERNAL MEDICINE

## 2024-08-20 RX ORDER — LINAGLIPTIN 5 MG/1
5 TABLET, FILM COATED ORAL DAILY
Qty: 90 TABLET | Refills: 1 | Status: SHIPPED | OUTPATIENT
Start: 2024-08-20

## 2024-08-20 RX ORDER — ATORVASTATIN CALCIUM 40 MG/1
40 TABLET, FILM COATED ORAL NIGHTLY
Qty: 90 TABLET | Refills: 1 | Status: SHIPPED | OUTPATIENT
Start: 2024-08-20
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
## 2024-08-20 NOTE — PROGRESS NOTES
----------------------------  Dexcom Clarity  -----------------------------  Ollie Hernández  YOB: 1945  Generated at: Tue, Aug 20, 2024 10:04 AM CDT  Reporting period: Wed Aug 7, 2024 - Tue Aug 20, 2024  -----------------------------  Glucose Details  Average glucose: 176 mg/dL  Standard deviation: 42 mg/dL  GMI: N/A  -----------------------------  Time in Range  Very High: 5%  High: 36%  In Range: 59%  Low: 0%  Very Low: 0%    Target Range   mg/dL    -----------------------------  CGM Details  Sensor usage: 57%  Days with CGM data: 8/14
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
## 2024-08-21 NOTE — PATIENT INSTRUCTIONS
He was seen in clinic today for follow-up.  Today, we did review your hospital course and gastrointestinal workup.  This is likely due to recurrent bleeding in the setting of anticoagulation  - We will closely monitor while on aspirin.  You may need to clarify with cardiology if Eliquis should be started at this time as there has been no bleeding.  - Definitive management the Watchman procedure to limit use of blood thinners moving forward  - We have to be careful given your history of recent stents, as risk for clotting within the stents is high  - Follow-up with cardiology as scheduled    Periodically checking blood pressures at home    Continue with scheduled hemodialysis with nephrology    Return to clinic as scheduled 10/15/2024 for follow-up  -Please have your dialysis center notify us of the results of your blood counts next week

## 2024-08-22 ENCOUNTER — OFFICE VISIT (OUTPATIENT)
Dept: INTERNAL MEDICINE CLINIC | Facility: CLINIC | Age: 79
End: 2024-08-22

## 2024-08-22 VITALS
DIASTOLIC BLOOD PRESSURE: 46 MMHG | OXYGEN SATURATION: 98 % | HEIGHT: 64 IN | TEMPERATURE: 98 F | SYSTOLIC BLOOD PRESSURE: 126 MMHG | HEART RATE: 71 BPM | WEIGHT: 159.81 LBS | BODY MASS INDEX: 27.28 KG/M2

## 2024-08-22 DIAGNOSIS — Z79.4 TYPE 2 DIABETES MELLITUS WITH DIABETIC NEUROPATHY, WITH LONG-TERM CURRENT USE OF INSULIN (HCC): ICD-10-CM

## 2024-08-22 DIAGNOSIS — M10.9 GOUT, UNSPECIFIED CAUSE, UNSPECIFIED CHRONICITY, UNSPECIFIED SITE: ICD-10-CM

## 2024-08-22 DIAGNOSIS — Z09 HOSPITAL DISCHARGE FOLLOW-UP: Primary | ICD-10-CM

## 2024-08-22 DIAGNOSIS — I48.0 PAROXYSMAL ATRIAL FIBRILLATION (HCC): ICD-10-CM

## 2024-08-22 DIAGNOSIS — I50.32 CHRONIC DIASTOLIC CONGESTIVE HEART FAILURE (HCC): ICD-10-CM

## 2024-08-22 DIAGNOSIS — E11.40 TYPE 2 DIABETES MELLITUS WITH DIABETIC NEUROPATHY, WITH LONG-TERM CURRENT USE OF INSULIN (HCC): ICD-10-CM

## 2024-08-22 DIAGNOSIS — D63.1 ANEMIA OF CHRONIC RENAL FAILURE, STAGE 5 (HCC): ICD-10-CM

## 2024-08-22 DIAGNOSIS — I10 PRIMARY HYPERTENSION: ICD-10-CM

## 2024-08-22 DIAGNOSIS — G47.33 OSA ON CPAP: ICD-10-CM

## 2024-08-22 DIAGNOSIS — N18.5 ANEMIA OF CHRONIC RENAL FAILURE, STAGE 5 (HCC): ICD-10-CM

## 2024-08-22 PROCEDURE — 3074F SYST BP LT 130 MM HG: CPT | Performed by: INTERNAL MEDICINE

## 2024-08-22 PROCEDURE — 1111F DSCHRG MED/CURRENT MED MERGE: CPT | Performed by: INTERNAL MEDICINE

## 2024-08-22 PROCEDURE — 3078F DIAST BP <80 MM HG: CPT | Performed by: INTERNAL MEDICINE

## 2024-08-22 PROCEDURE — 99495 TRANSJ CARE MGMT MOD F2F 14D: CPT | Performed by: INTERNAL MEDICINE

## 2024-08-22 PROCEDURE — 3008F BODY MASS INDEX DOCD: CPT | Performed by: INTERNAL MEDICINE

## 2024-08-22 PROCEDURE — 1125F AMNT PAIN NOTED PAIN PRSNT: CPT | Performed by: INTERNAL MEDICINE

## 2024-08-22 PROCEDURE — 1159F MED LIST DOCD IN RCRD: CPT | Performed by: INTERNAL MEDICINE

## 2024-08-22 PROCEDURE — 1160F RVW MEDS BY RX/DR IN RCRD: CPT | Performed by: INTERNAL MEDICINE

## 2024-08-22 RX ORDER — ESCITALOPRAM OXALATE 5 MG/1
5 TABLET ORAL DAILY
Qty: 90 TABLET | Refills: 3 | Status: SHIPPED | OUTPATIENT
Start: 2024-08-22
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
## 2024-08-24 ENCOUNTER — TELEPHONE (OUTPATIENT)
Dept: INTERNAL MEDICINE CLINIC | Facility: CLINIC | Age: 79
End: 2024-08-24

## 2024-08-24 NOTE — TELEPHONE ENCOUNTER
Received outside hospital records from [facility] [place]  - 8/22: Hemoglobin 8.8  - Hemoglobin 8/15 - 7.9  - 8/7/2024 hemoglobin 7.8  - 8/3 hemoglobin 9.3

## 2024-08-28 ENCOUNTER — PREP FOR CASE (OUTPATIENT)
Dept: CARDIOLOGY | Age: 79
End: 2024-08-28

## 2024-08-28 DIAGNOSIS — I48.0 PAROXYSMAL ATRIAL FIBRILLATION  (CMD): Primary | ICD-10-CM

## 2024-08-28 RX ORDER — 0.9 % SODIUM CHLORIDE 0.9 %
2 VIAL (ML) INJECTION EVERY 12 HOURS SCHEDULED
OUTPATIENT
Start: 2024-08-28

## 2024-08-30 ENCOUNTER — TELEPHONE (OUTPATIENT)
Dept: INTERNAL MEDICINE CLINIC | Facility: CLINIC | Age: 79
End: 2024-08-30

## 2024-08-30 NOTE — TELEPHONE ENCOUNTER
Moderate nonproliferative diabetic retinopathy of left eye with macular edema associated with type 2 diabetes.  Moderate nonproliferative diabetic retinopathy of the right eye without macular edema.  Noted on ophthalmology visit 8/27/2024, Dr. Milagro Kline.  Recommended evaluation with Dr. Philip for retinal exam.  Updated flowsheets according

## 2024-09-05 DIAGNOSIS — I48.0 PAROXYSMAL ATRIAL FIBRILLATION  (CMD): Primary | ICD-10-CM

## 2024-09-06 ENCOUNTER — PATIENT OUTREACH (OUTPATIENT)
Dept: CASE MANAGEMENT | Age: 79
End: 2024-09-06

## 2024-09-06 ENCOUNTER — LAB SERVICES (OUTPATIENT)
Dept: LAB | Age: 79
End: 2024-09-06

## 2024-09-06 DIAGNOSIS — I48.0 PAROXYSMAL ATRIAL FIBRILLATION  (CMD): ICD-10-CM

## 2024-09-06 LAB
ABO + RH BLD: NORMAL
ANION GAP SERPL CALC-SCNC: 8 MMOL/L (ref 7–19)
BASOPHILS # BLD: 0 K/MCL (ref 0–0.3)
BASOPHILS NFR BLD: 1 %
BLD GP AB SCN SERPL QL GEL: NEGATIVE
BUN SERPL-MCNC: 22 MG/DL (ref 6–20)
BUN/CREAT SERPL: 7 (ref 7–25)
CALCIUM SERPL-MCNC: 9 MG/DL (ref 8.4–10.2)
CHLORIDE SERPL-SCNC: 98 MMOL/L (ref 97–110)
CO2 SERPL-SCNC: 33 MMOL/L (ref 21–32)
CREAT SERPL-MCNC: 3.02 MG/DL (ref 0.67–1.17)
DEPRECATED RDW RBC: 62.7 FL (ref 39–50)
EGFRCR SERPLBLD CKD-EPI 2021: 21 ML/MIN/{1.73_M2}
EOSINOPHIL # BLD: 0.2 K/MCL (ref 0–0.5)
EOSINOPHIL NFR BLD: 3 %
ERYTHROCYTE [DISTWIDTH] IN BLOOD: 17.7 % (ref 11–15)
FASTING DURATION TIME PATIENT: 0 HOURS (ref 0–999)
GLUCOSE SERPL-MCNC: 101 MG/DL (ref 70–99)
HCT VFR BLD CALC: 33.9 % (ref 39–51)
HGB BLD-MCNC: 10.8 G/DL (ref 13–17)
IMM GRANULOCYTES # BLD AUTO: 0 K/MCL (ref 0–0.2)
IMM GRANULOCYTES # BLD: 1 %
LYMPHOCYTES # BLD: 1.3 K/MCL (ref 1–4)
LYMPHOCYTES NFR BLD: 22 %
MCH RBC QN AUTO: 30.8 PG (ref 26–34)
MCHC RBC AUTO-ENTMCNC: 31.9 G/DL (ref 32–36.5)
MCV RBC AUTO: 96.6 FL (ref 78–100)
MONOCYTES # BLD: 0.4 K/MCL (ref 0.3–0.9)
MONOCYTES NFR BLD: 8 %
NEUTROPHILS # BLD: 3.9 K/MCL (ref 1.8–7.7)
NEUTROPHILS NFR BLD: 65 %
NRBC BLD MANUAL-RTO: 0 /100 WBC
PLATELET # BLD AUTO: 188 K/MCL (ref 140–450)
POTASSIUM SERPL-SCNC: 3.3 MMOL/L (ref 3.4–5.1)
RBC # BLD: 3.51 MIL/MCL (ref 4.5–5.9)
SODIUM SERPL-SCNC: 136 MMOL/L (ref 135–145)
TYPE AND SCREEN EXPIRATION DATE: NORMAL
WBC # BLD: 5.9 K/MCL (ref 4.2–11)

## 2024-09-06 PROCEDURE — 86850 RBC ANTIBODY SCREEN: CPT | Performed by: INTERNAL MEDICINE

## 2024-09-06 PROCEDURE — 86901 BLOOD TYPING SEROLOGIC RH(D): CPT | Performed by: INTERNAL MEDICINE

## 2024-09-06 PROCEDURE — 85025 COMPLETE CBC W/AUTO DIFF WBC: CPT | Performed by: INTERNAL MEDICINE

## 2024-09-06 PROCEDURE — 80048 BASIC METABOLIC PNL TOTAL CA: CPT | Performed by: INTERNAL MEDICINE

## 2024-09-06 PROCEDURE — 36415 COLL VENOUS BLD VENIPUNCTURE: CPT | Performed by: INTERNAL MEDICINE

## 2024-09-06 PROCEDURE — 86900 BLOOD TYPING SEROLOGIC ABO: CPT | Performed by: INTERNAL MEDICINE

## 2024-09-06 RX ORDER — LINAGLIPTIN 5 MG/1
1 TABLET, FILM COATED ORAL DAILY
COMMUNITY
Start: 2024-08-20

## 2024-09-06 RX ORDER — CLOPIDOGREL BISULFATE 75 MG/1
75 TABLET ORAL DAILY
Status: ON HOLD | COMMUNITY
Start: 2024-05-21 | End: 2024-09-11

## 2024-09-06 RX ORDER — ATORVASTATIN CALCIUM 40 MG/1
1 TABLET, FILM COATED ORAL NIGHTLY
COMMUNITY
Start: 2024-08-20

## 2024-09-06 RX ORDER — FLUTICASONE PROPIONATE 50 MCG
2 SPRAY, SUSPENSION (ML) NASAL DAILY
COMMUNITY
Start: 2024-05-09

## 2024-09-06 RX ORDER — HYDRALAZINE HYDROCHLORIDE 25 MG/1
1 TABLET, FILM COATED ORAL 3 TIMES DAILY
COMMUNITY
Start: 2024-05-22 | End: 2024-09-09

## 2024-09-06 RX ORDER — CETIRIZINE HYDROCHLORIDE 10 MG/1
10 TABLET ORAL DAILY
COMMUNITY

## 2024-09-06 RX ORDER — CARVEDILOL 25 MG/1
1 TABLET ORAL 2 TIMES DAILY
COMMUNITY
Start: 2023-10-09

## 2024-09-06 RX ORDER — ESCITALOPRAM OXALATE 5 MG/1
1 TABLET ORAL DAILY
COMMUNITY
Start: 2024-08-22

## 2024-09-06 RX ORDER — TRAMADOL HYDROCHLORIDE 50 MG/1
50 TABLET ORAL EVERY 12 HOURS PRN
COMMUNITY
End: 2024-09-09

## 2024-09-06 RX ORDER — FELODIPINE 10 MG/1
1 TABLET, EXTENDED RELEASE ORAL DAILY
COMMUNITY
Start: 2024-08-05

## 2024-09-06 RX ORDER — PANTOPRAZOLE SODIUM 40 MG/1
1 TABLET, DELAYED RELEASE ORAL
COMMUNITY
Start: 2024-07-16

## 2024-09-09 RX ORDER — ASPIRIN 81 MG/1
81 TABLET ORAL DAILY
COMMUNITY

## 2024-09-09 RX ORDER — ALBUTEROL SULFATE 90 UG/1
2 INHALANT RESPIRATORY (INHALATION) EVERY 4 HOURS PRN
COMMUNITY

## 2024-09-11 ENCOUNTER — HOSPITAL ENCOUNTER (INPATIENT)
Age: 79
LOS: 1 days | Discharge: HOME | DRG: 273 | End: 2024-09-12
Attending: INTERNAL MEDICINE | Admitting: INTERNAL MEDICINE

## 2024-09-11 ENCOUNTER — HOSPITAL ENCOUNTER (OUTPATIENT)
Dept: CARDIOLOGY | Age: 79
Discharge: HOME | End: 2024-09-11
Attending: INTERNAL MEDICINE | Admitting: INTERNAL MEDICINE

## 2024-09-11 ENCOUNTER — ANESTHESIA EVENT (OUTPATIENT)
Dept: CARDIOLOGY | Age: 79
End: 2024-09-11

## 2024-09-11 ENCOUNTER — ANESTHESIA (OUTPATIENT)
Dept: CARDIOLOGY | Age: 79
End: 2024-09-11

## 2024-09-11 ENCOUNTER — APPOINTMENT (OUTPATIENT)
Dept: DIALYSIS | Age: 79
DRG: 273 | End: 2024-09-11

## 2024-09-11 VITALS — WEIGHT: 157.85 LBS | BODY MASS INDEX: 26.95 KG/M2 | HEIGHT: 64 IN

## 2024-09-11 DIAGNOSIS — I48.0 PAROXYSMAL ATRIAL FIBRILLATION  (CMD): ICD-10-CM

## 2024-09-11 DIAGNOSIS — I48.19 ATRIAL FIBRILLATION, PERSISTENT  (CMD): ICD-10-CM

## 2024-09-11 LAB
ABO + RH BLD: NORMAL
ACT BLD: 312 BASELINE/TARGET RANGES ARE SET BY CLINICIANS FOR EACH PATIENT/PROCEDURE
ANION GAP SERPL CALC-SCNC: 9 MMOL/L (ref 7–19)
ATRIAL RATE (BPM): 62
BLD GP AB SCN SERPL QL GEL: NEGATIVE
BUN SERPL-MCNC: 55 MG/DL (ref 6–20)
BUN/CREAT SERPL: 9 (ref 7–25)
CALCIUM SERPL-MCNC: 9.2 MG/DL (ref 8.4–10.2)
CHLORIDE SERPL-SCNC: 103 MMOL/L (ref 97–110)
CO2 SERPL-SCNC: 30 MMOL/L (ref 21–32)
CREAT SERPL-MCNC: 6.22 MG/DL (ref 0.67–1.17)
EGFRCR SERPLBLD CKD-EPI 2021: 9 ML/MIN/{1.73_M2}
FASTING DURATION TIME PATIENT: ABNORMAL H
GLUCOSE BLDC GLUCOMTR-MCNC: 131 MG/DL (ref 70–99)
GLUCOSE BLDC GLUCOMTR-MCNC: 150 MG/DL (ref 70–99)
GLUCOSE BLDC GLUCOMTR-MCNC: 159 MG/DL (ref 70–99)
GLUCOSE SERPL-MCNC: 143 MG/DL (ref 70–99)
HBA1C MFR BLD: 4.9 % (ref 4.5–5.6)
HBV CORE IGG+IGM SER QL: NEGATIVE
HBV SURFACE AG SER QL: NEGATIVE
P AXIS (DEGREES): 71
PHOSPHATE SERPL-MCNC: 5 MG/DL (ref 2.4–4.7)
POTASSIUM SERPL-SCNC: 3.5 MMOL/L (ref 3.4–5.1)
PR-INTERVAL (MSEC): 162
QRS-INTERVAL (MSEC): 78
QT-INTERVAL (MSEC): 440
QTC: 447
R AXIS (DEGREES): 10
REPORT TEXT: NORMAL
SODIUM SERPL-SCNC: 138 MMOL/L (ref 135–145)
T AXIS (DEGREES): 65
TYPE AND SCREEN EXPIRATION DATE: NORMAL
VENTRICULAR RATE EKG/MIN (BPM): 62

## 2024-09-11 PROCEDURE — 10006023 HB SUPPLY 272: Performed by: INTERNAL MEDICINE

## 2024-09-11 PROCEDURE — 10004651 HB RX, NO CHARGE ITEM: Performed by: INTERNAL MEDICINE

## 2024-09-11 PROCEDURE — 10006031 HB ROOM CHARGE TELEMETRY

## 2024-09-11 PROCEDURE — 10002805 HB CONTRAST AGENT: Performed by: INTERNAL MEDICINE

## 2024-09-11 PROCEDURE — 80048 BASIC METABOLIC PNL TOTAL CA: CPT | Performed by: STUDENT IN AN ORGANIZED HEALTH CARE EDUCATION/TRAINING PROGRAM

## 2024-09-11 PROCEDURE — 86704 HEP B CORE ANTIBODY TOTAL: CPT

## 2024-09-11 PROCEDURE — 93005 ELECTROCARDIOGRAM TRACING: CPT

## 2024-09-11 PROCEDURE — 10002807 HB RX 258

## 2024-09-11 PROCEDURE — 13000004 HB  ANESTHESIA  GENERAL OUTSIDE OR: Performed by: INTERNAL MEDICINE

## 2024-09-11 PROCEDURE — C1894 INTRO/SHEATH, NON-LASER: HCPCS | Performed by: INTERNAL MEDICINE

## 2024-09-11 PROCEDURE — 93355 ECHO TRANSESOPHAGEAL (TEE): CPT

## 2024-09-11 PROCEDURE — 10002800 HB RX 250 W HCPCS: Performed by: STUDENT IN AN ORGANIZED HEALTH CARE EDUCATION/TRAINING PROGRAM

## 2024-09-11 PROCEDURE — 85347 COAGULATION TIME ACTIVATED: CPT | Performed by: INTERNAL MEDICINE

## 2024-09-11 PROCEDURE — 84100 ASSAY OF PHOSPHORUS: CPT

## 2024-09-11 PROCEDURE — 83036 HEMOGLOBIN GLYCOSYLATED A1C: CPT | Performed by: INTERNAL MEDICINE

## 2024-09-11 PROCEDURE — 94660 CPAP INITIATION&MGMT: CPT

## 2024-09-11 PROCEDURE — 10004651 HB RX, NO CHARGE ITEM

## 2024-09-11 PROCEDURE — 93662 INTRACARDIAC ECG (ICE): CPT | Performed by: INTERNAL MEDICINE

## 2024-09-11 PROCEDURE — 10002019 HB COUNTER RESP ASSESSMENT

## 2024-09-11 PROCEDURE — 86850 RBC ANTIBODY SCREEN: CPT | Performed by: INTERNAL MEDICINE

## 2024-09-11 PROCEDURE — C1769 GUIDE WIRE: HCPCS | Performed by: INTERNAL MEDICINE

## 2024-09-11 PROCEDURE — 10002800 HB RX 250 W HCPCS

## 2024-09-11 PROCEDURE — 10004451 HB PACU RECOVERY 1ST 30 MINUTES: Performed by: INTERNAL MEDICINE

## 2024-09-11 PROCEDURE — 86706 HEP B SURFACE ANTIBODY: CPT

## 2024-09-11 PROCEDURE — 10002803 HB RX 637

## 2024-09-11 PROCEDURE — 33340 PERQ CLSR TCAT L ATR APNDGE: CPT | Performed by: INTERNAL MEDICINE

## 2024-09-11 PROCEDURE — 10002801 HB RX 250 W/O HCPCS

## 2024-09-11 PROCEDURE — 10002803 HB RX 637: Performed by: INTERNAL MEDICINE

## 2024-09-11 PROCEDURE — 96372 THER/PROPH/DIAG INJ SC/IM: CPT | Performed by: INTERNAL MEDICINE

## 2024-09-11 PROCEDURE — 10004180 HB COUNTER-TRANSPORT

## 2024-09-11 PROCEDURE — 87340 HEPATITIS B SURFACE AG IA: CPT

## 2024-09-11 PROCEDURE — C1760 CLOSURE DEV, VASC: HCPCS | Performed by: INTERNAL MEDICINE

## 2024-09-11 PROCEDURE — 10002800 HB RX 250 W HCPCS: Performed by: INTERNAL MEDICINE

## 2024-09-11 PROCEDURE — 10006027 HB SUPPLY 278: Performed by: INTERNAL MEDICINE

## 2024-09-11 PROCEDURE — 90935 HEMODIALYSIS ONE EVALUATION: CPT

## 2024-09-11 PROCEDURE — 10004452 HB PACU ADDL 30 MINUTES: Performed by: INTERNAL MEDICINE

## 2024-09-11 PROCEDURE — 5A1D70Z PERFORMANCE OF URINARY FILTRATION, INTERMITTENT, LESS THAN 6 HOURS PER DAY: ICD-10-PCS | Performed by: INTERNAL MEDICINE

## 2024-09-11 PROCEDURE — 82962 GLUCOSE BLOOD TEST: CPT

## 2024-09-11 PROCEDURE — 02L73DK OCCLUSION OF LEFT ATRIAL APPENDAGE WITH INTRALUMINAL DEVICE, PERCUTANEOUS APPROACH: ICD-10-PCS | Performed by: INTERNAL MEDICINE

## 2024-09-11 PROCEDURE — 36415 COLL VENOUS BLD VENIPUNCTURE: CPT | Performed by: STUDENT IN AN ORGANIZED HEALTH CARE EDUCATION/TRAINING PROGRAM

## 2024-09-11 DEVICE — LEFT ATRIAL APPENDAGE CLOSURE DEVICE WITH DELIVERY SYSTEM
Type: IMPLANTABLE DEVICE | Site: ATRIUM | Status: FUNCTIONAL
Brand: WATCHMAN FLX™ PRO

## 2024-09-11 DEVICE — DEVICE WATCHMAN FLX PRO PROCEDURE: Type: IMPLANTABLE DEVICE | Site: ATRIUM | Status: FUNCTIONAL

## 2024-09-11 DEVICE — THE VASCADE MVP XL VENOUS VASCULAR CLOSURE SYSTEM (VVCS) 10-12F IS INTENDED TO SEAL FEMORAL VEINS WITH SINGLE OR MULTIPLE ACCESS SITES IN ONE OR BOTH LIMBS AT THE COMPLETION OF CATHETERIZATION PROCEDURES. THE SYSTEM IS DESIGNED TO DELIVER A RESORBABLE COLLAGEN PATCH, EXTRA-VASCULARLY, AT VESSEL PUNCTURE SITES TO ACHIEVE HEMOSTASIS. FOR USE WITH 10FR TO 12FR (15F MAXIMUM OUTER DIAMETER) INTRODUCER SHEATHS; OVERALL LENGTH OF THE SHEATH (INCLUDING THE HUB) NEEDS TO BE LESS THAN 15CM.
Type: IMPLANTABLE DEVICE | Site: FEMORAL VEIN | Status: FUNCTIONAL
Brand: CARDIVA VASCADE MVP XL VVCS 10-12F

## 2024-09-11 DEVICE — THE VASCADE MVP VENOUS VASCULAR CLOSURE SYSTEM (VVCS) IS INTENDED TO SEAL FEMORAL VEINS WITH SINGLE OR MULTIPLE ACCESS SITES IN ONE OR BOTH LIMBS AT THE COMPLETION OF CATHETERIZATION PROCEDURES. THE SYSTEM IS DESIGNED TO DELIVER A RESORBABLE COLLAGEN PATCH, EXTRA-VASCULARLY, AT VESSEL PUNCTURE SITES TO ACHIEVE HEMOSTASIS. FOR USE WITH 6FR TO 12FR (15F MAXIMUM OUTER DIAMETER) INTRODUCER SHEATHS; OVERALL LENGTH OF THE SHEATH (INCLUDING THE HUB) NEEDS TO BE LESS THAN 15CM.
Type: IMPLANTABLE DEVICE | Site: FEMORAL VEIN | Status: FUNCTIONAL
Brand: CARDIVA VASCADE MVP VVCS 6-12F

## 2024-09-11 RX ORDER — PALONOSETRON 0.05 MG/ML
0.25 INJECTION, SOLUTION INTRAVENOUS
Status: ACTIVE | OUTPATIENT
Start: 2024-09-11 | End: 2024-09-11

## 2024-09-11 RX ORDER — NICOTINE POLACRILEX 4 MG
15 LOZENGE BUCCAL PRN
Status: DISCONTINUED | OUTPATIENT
Start: 2024-09-11 | End: 2024-09-12 | Stop reason: HOSPADM

## 2024-09-11 RX ORDER — HYDRALAZINE HYDROCHLORIDE 20 MG/ML
INJECTION INTRAMUSCULAR; INTRAVENOUS
Status: DISPENSED
Start: 2024-09-11 | End: 2024-09-11

## 2024-09-11 RX ORDER — HYDRALAZINE HYDROCHLORIDE 20 MG/ML
5 INJECTION INTRAMUSCULAR; INTRAVENOUS EVERY 10 MIN PRN
Status: DISCONTINUED | OUTPATIENT
Start: 2024-09-11 | End: 2024-09-11 | Stop reason: HOSPADM

## 2024-09-11 RX ORDER — ROCURONIUM BROMIDE 10 MG/ML
INJECTION, SOLUTION INTRAVENOUS PRN
Status: DISCONTINUED | OUTPATIENT
Start: 2024-09-11 | End: 2024-09-11

## 2024-09-11 RX ORDER — INSULIN GLARGINE 100 [IU]/ML
6 INJECTION, SOLUTION SUBCUTANEOUS EVERY MORNING
COMMUNITY

## 2024-09-11 RX ORDER — ONDANSETRON 2 MG/ML
4 INJECTION INTRAMUSCULAR; INTRAVENOUS
Status: ACTIVE | OUTPATIENT
Start: 2024-09-11 | End: 2024-09-11

## 2024-09-11 RX ORDER — ESCITALOPRAM OXALATE 5 MG/1
5 TABLET ORAL DAILY
Status: DISCONTINUED | OUTPATIENT
Start: 2024-09-11 | End: 2024-09-12 | Stop reason: HOSPADM

## 2024-09-11 RX ORDER — SODIUM CHLORIDE 9 MG/ML
INJECTION, SOLUTION INTRAVENOUS CONTINUOUS PRN
Status: DISCONTINUED | OUTPATIENT
Start: 2024-09-11 | End: 2024-09-11

## 2024-09-11 RX ORDER — CLOPIDOGREL BISULFATE 75 MG/1
75 TABLET ORAL DAILY
Status: DISCONTINUED | OUTPATIENT
Start: 2024-09-11 | End: 2024-09-12 | Stop reason: HOSPADM

## 2024-09-11 RX ORDER — DROPERIDOL 2.5 MG/ML
0.62 INJECTION, SOLUTION INTRAMUSCULAR; INTRAVENOUS
Status: ACTIVE | OUTPATIENT
Start: 2024-09-11 | End: 2024-09-11

## 2024-09-11 RX ORDER — ENALAPRILAT 1.25 MG/ML
1.25 INJECTION INTRAVENOUS
Status: DISCONTINUED | OUTPATIENT
Start: 2024-09-11 | End: 2024-09-11 | Stop reason: HOSPADM

## 2024-09-11 RX ORDER — DEXTROSE MONOHYDRATE 25 G/50ML
12.5 INJECTION, SOLUTION INTRAVENOUS PRN
Status: DISCONTINUED | OUTPATIENT
Start: 2024-09-11 | End: 2024-09-12 | Stop reason: HOSPADM

## 2024-09-11 RX ORDER — ONDANSETRON 2 MG/ML
INJECTION INTRAMUSCULAR; INTRAVENOUS PRN
Status: DISCONTINUED | OUTPATIENT
Start: 2024-09-11 | End: 2024-09-11

## 2024-09-11 RX ORDER — PROPOFOL 10 MG/ML
INJECTION, EMULSION INTRAVENOUS PRN
Status: DISCONTINUED | OUTPATIENT
Start: 2024-09-11 | End: 2024-09-11

## 2024-09-11 RX ORDER — PROTAMINE SULFATE 10 MG/ML
INJECTION, SOLUTION INTRAVENOUS PRN
Status: DISCONTINUED | OUTPATIENT
Start: 2024-09-11 | End: 2024-09-11

## 2024-09-11 RX ORDER — CARVEDILOL 25 MG/1
25 TABLET ORAL 2 TIMES DAILY
Status: DISCONTINUED | OUTPATIENT
Start: 2024-09-11 | End: 2024-09-12 | Stop reason: HOSPADM

## 2024-09-11 RX ORDER — DEXAMETHASONE SODIUM PHOSPHATE 4 MG/ML
INJECTION, SOLUTION INTRA-ARTICULAR; INTRALESIONAL; INTRAMUSCULAR; INTRAVENOUS; SOFT TISSUE PRN
Status: DISCONTINUED | OUTPATIENT
Start: 2024-09-11 | End: 2024-09-11

## 2024-09-11 RX ORDER — POTASSIUM CHLORIDE 29.8 MG/ML
20 INJECTION INTRAVENOUS PRN
Status: DISCONTINUED | OUTPATIENT
Start: 2024-09-11 | End: 2024-09-12 | Stop reason: HOSPADM

## 2024-09-11 RX ORDER — OXYCODONE HYDROCHLORIDE 5 MG/1
5 TABLET ORAL
Status: ACTIVE | OUTPATIENT
Start: 2024-09-11 | End: 2024-09-11

## 2024-09-11 RX ORDER — LIDOCAINE HYDROCHLORIDE 20 MG/ML
INJECTION, SOLUTION INFILTRATION; PERINEURAL PRN
Status: DISCONTINUED | OUTPATIENT
Start: 2024-09-11 | End: 2024-09-11

## 2024-09-11 RX ORDER — HYDROCODONE BITARTRATE AND ACETAMINOPHEN 5; 325 MG/1; MG/1
1 TABLET ORAL
Status: DISCONTINUED | OUTPATIENT
Start: 2024-09-11 | End: 2024-09-11 | Stop reason: HOSPADM

## 2024-09-11 RX ORDER — POTASSIUM CHLORIDE 29.8 MG/ML
40 INJECTION INTRAVENOUS PRN
Status: DISCONTINUED | OUTPATIENT
Start: 2024-09-11 | End: 2024-09-12 | Stop reason: HOSPADM

## 2024-09-11 RX ORDER — DIPHENHYDRAMINE HCL 25 MG
25 CAPSULE ORAL
Status: ACTIVE | OUTPATIENT
Start: 2024-09-11 | End: 2024-09-11

## 2024-09-11 RX ORDER — HYDRALAZINE HYDROCHLORIDE 25 MG/1
25 TABLET, FILM COATED ORAL 3 TIMES DAILY
COMMUNITY

## 2024-09-11 RX ORDER — 0.9 % SODIUM CHLORIDE 0.9 %
2 VIAL (ML) INJECTION EVERY 12 HOURS SCHEDULED
Status: DISCONTINUED | OUTPATIENT
Start: 2024-09-11 | End: 2024-09-11 | Stop reason: HOSPADM

## 2024-09-11 RX ORDER — AMLODIPINE BESYLATE 10 MG/1
10 TABLET ORAL DAILY
Status: DISCONTINUED | OUTPATIENT
Start: 2024-09-11 | End: 2024-09-12 | Stop reason: HOSPADM

## 2024-09-11 RX ORDER — PANTOPRAZOLE SODIUM 40 MG/1
40 TABLET, DELAYED RELEASE ORAL
Status: DISCONTINUED | OUTPATIENT
Start: 2024-09-11 | End: 2024-09-12 | Stop reason: HOSPADM

## 2024-09-11 RX ORDER — POTASSIUM CHLORIDE 1.5 G/1.58G
20 POWDER, FOR SOLUTION ORAL PRN
Status: DISCONTINUED | OUTPATIENT
Start: 2024-09-11 | End: 2024-09-12 | Stop reason: HOSPADM

## 2024-09-11 RX ORDER — DEXTROSE MONOHYDRATE 25 G/50ML
25 INJECTION, SOLUTION INTRAVENOUS PRN
Status: DISCONTINUED | OUTPATIENT
Start: 2024-09-11 | End: 2024-09-12 | Stop reason: HOSPADM

## 2024-09-11 RX ORDER — ATORVASTATIN CALCIUM 40 MG/1
40 TABLET, FILM COATED ORAL NIGHTLY
Status: DISCONTINUED | OUTPATIENT
Start: 2024-09-11 | End: 2024-09-12 | Stop reason: HOSPADM

## 2024-09-11 RX ORDER — ALBUTEROL SULFATE 0.83 MG/ML
2.5 SOLUTION RESPIRATORY (INHALATION)
Status: DISCONTINUED | OUTPATIENT
Start: 2024-09-11 | End: 2024-09-12 | Stop reason: HOSPADM

## 2024-09-11 RX ORDER — ASPIRIN 81 MG/1
81 TABLET ORAL DAILY
Status: DISCONTINUED | OUTPATIENT
Start: 2024-09-11 | End: 2024-09-12 | Stop reason: HOSPADM

## 2024-09-11 RX ORDER — PROCHLORPERAZINE EDISYLATE 5 MG/ML
5 INJECTION INTRAMUSCULAR; INTRAVENOUS
Status: ACTIVE | OUTPATIENT
Start: 2024-09-11 | End: 2024-09-11

## 2024-09-11 RX ORDER — ACETAMINOPHEN 325 MG/1
650 TABLET ORAL EVERY 6 HOURS PRN
Status: DISCONTINUED | OUTPATIENT
Start: 2024-09-11 | End: 2024-09-12 | Stop reason: HOSPADM

## 2024-09-11 RX ORDER — POTASSIUM CHLORIDE 1.5 G/1.58G
40 POWDER, FOR SOLUTION ORAL PRN
Status: DISCONTINUED | OUTPATIENT
Start: 2024-09-11 | End: 2024-09-12 | Stop reason: HOSPADM

## 2024-09-11 RX ORDER — SODIUM CITRATE 4 % (5 ML)
3 SYRINGE (ML) MISCELLANEOUS PRN
Status: DISCONTINUED | OUTPATIENT
Start: 2024-09-11 | End: 2024-09-12 | Stop reason: HOSPADM

## 2024-09-11 RX ORDER — OXYCODONE HYDROCHLORIDE 5 MG/1
5 TABLET ORAL
Status: DISCONTINUED | OUTPATIENT
Start: 2024-09-11 | End: 2024-09-11 | Stop reason: HOSPADM

## 2024-09-11 RX ORDER — NICOTINE POLACRILEX 4 MG
30 LOZENGE BUCCAL PRN
Status: DISCONTINUED | OUTPATIENT
Start: 2024-09-11 | End: 2024-09-12 | Stop reason: HOSPADM

## 2024-09-11 RX ADMIN — ASPIRIN 81 MG: 81 TABLET, COATED ORAL at 13:12

## 2024-09-11 RX ADMIN — ONDANSETRON 4 MG: 2 INJECTION INTRAMUSCULAR; INTRAVENOUS at 10:33

## 2024-09-11 RX ADMIN — CARVEDILOL 25 MG: 25 TABLET, FILM COATED ORAL at 20:28

## 2024-09-11 RX ADMIN — SUGAMMADEX 200 MG: 100 INJECTION, SOLUTION INTRAVENOUS at 10:33

## 2024-09-11 RX ADMIN — LIDOCAINE HYDROCHLORIDE 3 ML: 20 INJECTION, SOLUTION INFILTRATION; PERINEURAL at 09:40

## 2024-09-11 RX ADMIN — PROPOFOL 150 MG: 10 INJECTION, EMULSION INTRAVENOUS at 09:40

## 2024-09-11 RX ADMIN — CEFAZOLIN SODIUM 2000 MG: 300 INJECTION, POWDER, LYOPHILIZED, FOR SOLUTION INTRAVENOUS at 20:28

## 2024-09-11 RX ADMIN — PANTOPRAZOLE SODIUM 40 MG: 40 TABLET, DELAYED RELEASE ORAL at 15:23

## 2024-09-11 RX ADMIN — ROCURONIUM BROMIDE 50 MG: 10 INJECTION INTRAVENOUS at 09:42

## 2024-09-11 RX ADMIN — SODIUM CHLORIDE: 9 INJECTION, SOLUTION INTRAVENOUS at 09:32

## 2024-09-11 RX ADMIN — AMLODIPINE BESYLATE 10 MG: 10 TABLET ORAL at 21:34

## 2024-09-11 RX ADMIN — ESCITALOPRAM OXALATE 5 MG: 5 TABLET, FILM COATED ORAL at 15:23

## 2024-09-11 RX ADMIN — HYDRALAZINE HYDROCHLORIDE 5 MG: 20 INJECTION INTRAMUSCULAR; INTRAVENOUS at 11:17

## 2024-09-11 RX ADMIN — ACETAMINOPHEN 650 MG: 325 TABLET ORAL at 21:34

## 2024-09-11 RX ADMIN — HEPARIN SODIUM 12000 UNITS: 1000 INJECTION INTRAVENOUS; SUBCUTANEOUS at 09:59

## 2024-09-11 RX ADMIN — FENTANYL CITRATE 100 MCG: 50 INJECTION INTRAMUSCULAR; INTRAVENOUS at 09:40

## 2024-09-11 RX ADMIN — INSULIN LISPRO 1 UNITS: 100 INJECTION, SOLUTION INTRAVENOUS; SUBCUTANEOUS at 21:34

## 2024-09-11 RX ADMIN — PROTAMINE SULFATE 50 MG: 10 INJECTION, SOLUTION INTRAVENOUS at 10:31

## 2024-09-11 RX ADMIN — CEFAZOLIN SODIUM 2000 MG: 300 INJECTION, POWDER, LYOPHILIZED, FOR SOLUTION INTRAVENOUS at 09:47

## 2024-09-11 RX ADMIN — CLOPIDOGREL BISULFATE 75 MG: 75 TABLET, FILM COATED ORAL at 13:12

## 2024-09-11 RX ADMIN — DEXAMETHASONE SODIUM PHOSPHATE 4 MG: 4 INJECTION INTRA-ARTICULAR; INTRALESIONAL; INTRAMUSCULAR; INTRAVENOUS; SOFT TISSUE at 09:48

## 2024-09-11 RX ADMIN — ATORVASTATIN CALCIUM 40 MG: 40 TABLET, FILM COATED ORAL at 20:28

## 2024-09-11 SDOH — HEALTH STABILITY: PHYSICAL HEALTH: DO YOU HAVE SERIOUS DIFFICULTY WALKING OR CLIMBING STAIRS?: YES

## 2024-09-11 SDOH — ECONOMIC STABILITY: GENERAL: WOULD YOU LIKE HELP WITH ANY OF THE FOLLOWING NEEDS?: I DON'T WANT HELP WITH ANY OF THESE

## 2024-09-11 SDOH — SOCIAL STABILITY: SOCIAL INSECURITY: HOW OFTEN DOES ANYONE, INCLUDING FAMILY AND FRIENDS, INSULT OR TALK DOWN TO YOU?: NEVER

## 2024-09-11 SDOH — HEALTH STABILITY: GENERAL: BECAUSE OF A PHYSICAL, MENTAL, OR EMOTIONAL CONDITION, DO YOU HAVE DIFFICULTY DOING ERRANDS ALONE?: YES

## 2024-09-11 SDOH — ECONOMIC STABILITY: HOUSING INSECURITY: WHAT IS YOUR LIVING SITUATION TODAY?: I HAVE A STEADY PLACE TO LIVE

## 2024-09-11 SDOH — ECONOMIC STABILITY: INCOME INSECURITY: IN THE PAST 12 MONTHS, HAS THE ELECTRIC, GAS, OIL, OR WATER COMPANY THREATENED TO SHUT OFF SERVICE IN YOUR HOME?: NO

## 2024-09-11 SDOH — ECONOMIC STABILITY: TRANSPORTATION INSECURITY
IN THE PAST 12 MONTHS, HAS LACK OF RELIABLE TRANSPORTATION KEPT YOU FROM MEDICAL APPOINTMENTS, MEETINGS, WORK OR FROM GETTING THINGS NEEDED FOR DAILY LIVING?: YES

## 2024-09-11 SDOH — SOCIAL STABILITY: SOCIAL INSECURITY: HOW OFTEN DOES ANYONE, INCLUDING FAMILY AND FRIENDS, SCREAM OR CURSE AT YOU?: NEVER

## 2024-09-11 SDOH — ECONOMIC STABILITY: HOUSING INSECURITY: WHAT IS YOUR LIVING SITUATION TODAY?: HOUSE

## 2024-09-11 SDOH — ECONOMIC STABILITY: HOUSING INSECURITY: DO YOU HAVE PROBLEMS WITH ANY OF THE FOLLOWING?: NONE OF THE ABOVE

## 2024-09-11 SDOH — ECONOMIC STABILITY: FOOD INSECURITY: WITHIN THE PAST 12 MONTHS, THE FOOD YOU BOUGHT JUST DIDN'T LAST AND YOU DIDN'T HAVE MONEY TO GET MORE.: NEVER TRUE

## 2024-09-11 SDOH — SOCIAL STABILITY: SOCIAL INSECURITY: HOW OFTEN DOES ANYONE, INCLUDING FAMILY AND FRIENDS, PHYSICALLY HURT YOU?: NEVER

## 2024-09-11 SDOH — HEALTH STABILITY: PHYSICAL HEALTH: DO YOU HAVE DIFFICULTY DRESSING OR BATHING?: YES

## 2024-09-11 SDOH — ECONOMIC STABILITY: GENERAL

## 2024-09-11 SDOH — HEALTH STABILITY: GENERAL
BECAUSE OF A PHYSICAL, MENTAL, OR EMOTIONAL CONDITION, DO YOU HAVE SERIOUS DIFFICULTY CONCENTRATING, REMEMBERING OR MAKING DECISIONS?: NO

## 2024-09-11 SDOH — ECONOMIC STABILITY: HOUSING INSECURITY: WHAT IS YOUR LIVING SITUATION TODAY?: SPOUSE

## 2024-09-11 SDOH — SOCIAL STABILITY: SOCIAL NETWORK: SUPPORT SYSTEMS: FAMILY MEMBERS;CHURCH/FAITH COMMUNITY;SPOUSE

## 2024-09-11 SDOH — SOCIAL STABILITY: SOCIAL INSECURITY: HOW OFTEN DOES ANYONE, INCLUDING FAMILY AND FRIENDS, THREATEN YOU WITH HARM?: NEVER

## 2024-09-11 SDOH — SOCIAL STABILITY: SOCIAL NETWORK
HOW OFTEN DO YOU SEE OR TALK TO PEOPLE THAT YOU CARE ABOUT AND FEEL CLOSE TO? (FOR EXAMPLE: TALKING TO FRIENDS ON THE PHONE, VISITING FRIENDS OR FAMILY, GOING TO CHURCH OR CLUB MEETINGS): 5 OR MORE TIMES A WEEK

## 2024-09-11 ASSESSMENT — PULMONARY FUNCTION TESTS
FEV1: 0
FEV1: 0
FEV1/FVC: UNABLE TO OBTAIN, OR GREATER THAN 70%
FEV1_PREDICTED: 0
FEV1: 0

## 2024-09-11 ASSESSMENT — PATIENT HEALTH QUESTIONNAIRE - PHQ9
CLINICAL INTERPRETATION OF PHQ2 SCORE: NO FURTHER SCREENING NEEDED
1. LITTLE INTEREST OR PLEASURE IN DOING THINGS: NOT AT ALL
SUM OF ALL RESPONSES TO PHQ9 QUESTIONS 1 AND 2: 0
2. FEELING DOWN, DEPRESSED OR HOPELESS: NOT AT ALL
IS PATIENT ABLE TO COMPLETE PHQ2 OR PHQ9: YES
SUM OF ALL RESPONSES TO PHQ9 QUESTIONS 1 AND 2: 0

## 2024-09-11 ASSESSMENT — ACTIVITIES OF DAILY LIVING (ADL)
FEEDING: INDEPENDENT
RECENT_DECLINE_ADL: NO
DRESSING: NEEDS ASSISTANCE
TOILETING: NEEDS ASSISTANCE
ADL_SCORE: 7
BATHING: NEEDS ASSISTANCE
ADL_SHORT_OF_BREATH: NO
ADL_BEFORE_ADMISSION: NEEDS/REQUIRES ASSISTANCE

## 2024-09-11 ASSESSMENT — PAIN SCALES - GENERAL
PAINLEVEL_OUTOF10: 3
PAINLEVEL_OUTOF10: 4
PAINLEVEL_OUTOF10: 3
PAINLEVEL_OUTOF10: 4
PAINLEVEL_OUTOF10: 3
PAINLEVEL_OUTOF10: 4

## 2024-09-11 ASSESSMENT — ENCOUNTER SYMPTOMS
FATIGUE: 0
SHORTNESS OF BREATH: 0
LIGHT-HEADEDNESS: 0
NAUSEA: 0
DIARRHEA: 0
VOMITING: 0
APPETITE CHANGE: 0
ACTIVITY CHANGE: 1
DIZZINESS: 0
HEADACHES: 0
FEVER: 0

## 2024-09-11 ASSESSMENT — LIFESTYLE VARIABLES
AUDIT-C TOTAL SCORE: 0
HOW OFTEN DO YOU HAVE A DRINK CONTAINING ALCOHOL: NEVER
HOW OFTEN DO YOU HAVE 6 OR MORE DRINKS ON ONE OCCASION: NEVER
ALCOHOL_USE_STATUS: NO OR LOW RISK WITH VALIDATED TOOL
HOW MANY STANDARD DRINKS CONTAINING ALCOHOL DO YOU HAVE ON A TYPICAL DAY: 0,1 OR 2

## 2024-09-11 ASSESSMENT — ORIENTATION MEMORY CONCENTRATION TEST (OMCT)
WHAT TIME IS IT (NO WATCH OR CLOCK): CORRECT
SAY THE MONTHS IN REVERSE ORDER STARTING WITH LAST MONTH: CORRECT
WHAT MONTH IS IT NOW: CORRECT
OMCT INTERPRETATION: 0-6: NO SIGNIFICANT IMPAIRMENT
REPEAT THE NAME AND ADDRESS I ASKED YOU TO REMEMBER: CORRECT
COUNT BACKWARDS FROM 20 TO 1: CORRECT
WHAT YEAR IS IT NOW (MUST BE EXACT): CORRECT
OMCT SCORE: 0

## 2024-09-11 ASSESSMENT — COLUMBIA-SUICIDE SEVERITY RATING SCALE - C-SSRS
6. HAVE YOU EVER DONE ANYTHING, STARTED TO DO ANYTHING, OR PREPARED TO DO ANYTHING TO END YOUR LIFE?: NO
IS THE PATIENT ABLE TO COMPLETE C-SSRS: YES
1. WITHIN THE PAST MONTH, HAVE YOU WISHED YOU WERE DEAD OR WISHED YOU COULD GO TO SLEEP AND NOT WAKE UP?: NO
2. HAVE YOU ACTUALLY HAD ANY THOUGHTS OF KILLING YOURSELF?: NO

## 2024-09-12 VITALS
TEMPERATURE: 97.5 F | DIASTOLIC BLOOD PRESSURE: 54 MMHG | BODY MASS INDEX: 26.16 KG/M2 | WEIGHT: 153.22 LBS | HEIGHT: 64 IN | HEART RATE: 66 BPM | RESPIRATION RATE: 18 BRPM | OXYGEN SATURATION: 100 % | SYSTOLIC BLOOD PRESSURE: 153 MMHG

## 2024-09-12 LAB
ANION GAP SERPL CALC-SCNC: 8 MMOL/L (ref 7–19)
BUN SERPL-MCNC: 30 MG/DL (ref 6–20)
BUN/CREAT SERPL: 7 (ref 7–25)
CALCIUM SERPL-MCNC: 9.5 MG/DL (ref 8.4–10.2)
CHLORIDE SERPL-SCNC: 101 MMOL/L (ref 97–110)
CO2 SERPL-SCNC: 31 MMOL/L (ref 21–32)
CREAT SERPL-MCNC: 4.33 MG/DL (ref 0.67–1.17)
DEPRECATED RDW RBC: 58.9 FL (ref 39–50)
EGFRCR SERPLBLD CKD-EPI 2021: 13 ML/MIN/{1.73_M2}
ERYTHROCYTE [DISTWIDTH] IN BLOOD: 17 % (ref 11–15)
FASTING DURATION TIME PATIENT: ABNORMAL H
GLUCOSE BLDC GLUCOMTR-MCNC: 115 MG/DL (ref 70–99)
GLUCOSE BLDC GLUCOMTR-MCNC: 120 MG/DL (ref 70–99)
GLUCOSE SERPL-MCNC: 118 MG/DL (ref 70–99)
HBV SURFACE AB SER-ACNC: 89.15 MUNITS/ML
HCT VFR BLD CALC: 33.3 % (ref 39–51)
HGB BLD-MCNC: 10.6 G/DL (ref 13–17)
MCH RBC QN AUTO: 30.2 PG (ref 26–34)
MCHC RBC AUTO-ENTMCNC: 31.8 G/DL (ref 32–36.5)
MCV RBC AUTO: 94.9 FL (ref 78–100)
NRBC BLD MANUAL-RTO: 0 /100 WBC
PLATELET # BLD AUTO: 172 K/MCL (ref 140–450)
POTASSIUM SERPL-SCNC: 4 MMOL/L (ref 3.4–5.1)
RBC # BLD: 3.51 MIL/MCL (ref 4.5–5.9)
SODIUM SERPL-SCNC: 136 MMOL/L (ref 135–145)
WBC # BLD: 9.3 K/MCL (ref 4.2–11)

## 2024-09-12 PROCEDURE — 36415 COLL VENOUS BLD VENIPUNCTURE: CPT

## 2024-09-12 PROCEDURE — 10002803 HB RX 637: Performed by: INTERNAL MEDICINE

## 2024-09-12 PROCEDURE — 10002803 HB RX 637

## 2024-09-12 PROCEDURE — 85027 COMPLETE CBC AUTOMATED: CPT

## 2024-09-12 PROCEDURE — 93005 ELECTROCARDIOGRAM TRACING: CPT

## 2024-09-12 PROCEDURE — 80048 BASIC METABOLIC PNL TOTAL CA: CPT

## 2024-09-12 PROCEDURE — 10004651 HB RX, NO CHARGE ITEM

## 2024-09-12 PROCEDURE — 94660 CPAP INITIATION&MGMT: CPT

## 2024-09-12 RX ORDER — CLOPIDOGREL BISULFATE 75 MG/1
75 TABLET ORAL DAILY
Qty: 90 TABLET | Refills: 0 | Status: SHIPPED | OUTPATIENT
Start: 2024-09-12

## 2024-09-12 RX ADMIN — AMLODIPINE BESYLATE 10 MG: 10 TABLET ORAL at 08:56

## 2024-09-12 RX ADMIN — CARVEDILOL 25 MG: 25 TABLET, FILM COATED ORAL at 08:56

## 2024-09-12 RX ADMIN — ASPIRIN 81 MG: 81 TABLET, COATED ORAL at 08:56

## 2024-09-12 RX ADMIN — CLOPIDOGREL BISULFATE 75 MG: 75 TABLET, FILM COATED ORAL at 08:56

## 2024-09-12 RX ADMIN — PANTOPRAZOLE SODIUM 40 MG: 40 TABLET, DELAYED RELEASE ORAL at 06:00

## 2024-09-12 RX ADMIN — ESCITALOPRAM OXALATE 5 MG: 5 TABLET, FILM COATED ORAL at 08:56

## 2024-09-12 ASSESSMENT — PAIN SCALES - GENERAL: PAINLEVEL_OUTOF10: 4

## 2024-09-12 ASSESSMENT — ENCOUNTER SYMPTOMS: ACTIVITY CHANGE: 1

## 2024-09-14 ENCOUNTER — TELEPHONE (OUTPATIENT)
Dept: CARE COORDINATION | Age: 79
End: 2024-09-14

## 2024-09-19 NOTE — DISCHARGE INSTRUCTIONS
- follows with urology, Dr. Kolb    Orders:    CBC and Auto Differential; Future    TSH with reflex to Free T4 if abnormal; Future    Testosterone, total and free; Future     Try not to dislodge glue that has been applied to the tip of your tongue.  If bleeding recurs, please return to the ER.

## 2024-09-21 ENCOUNTER — TELEPHONE (OUTPATIENT)
Dept: CARE COORDINATION | Age: 79
End: 2024-09-21

## 2024-09-22 ENCOUNTER — TELEPHONE (OUTPATIENT)
Dept: CARE COORDINATION | Age: 79
End: 2024-09-22

## 2024-09-23 ENCOUNTER — TELEPHONE (OUTPATIENT)
Dept: CARE COORDINATION | Age: 79
End: 2024-09-23

## 2024-09-24 ENCOUNTER — TELEPHONE (OUTPATIENT)
Dept: CARE COORDINATION | Age: 79
End: 2024-09-24

## 2024-09-26 NOTE — PLAN OF CARE
Problem: Patient Centered Care  Goal: Patient preferences are identified and integrated in the patient's plan of care  Description: Interventions:  - What would you like us to know as we care for you? Wants hemoglobin stabilized  - Provide timely, complete, and accurate information to patient/family  - Incorporate patient and family knowledge, values, beliefs, and cultural backgrounds into the planning and delivery of care  - Encourage patient/family to participate in care and decision-making at the level they choose  - Honor patient and family perspectives and choices  Outcome: Progressing     Problem: Diabetes/Glucose Control  Goal: Glucose maintained within prescribed range  Description: INTERVENTIONS:  - Monitor Blood Glucose as ordered  - Assess for signs and symptoms of hyperglycemia and hypoglycemia  - Administer ordered medications to maintain glucose within target range  - Assess barriers to adequate nutritional intake and initiate nutrition consult as needed  - Instruct patient on self management of diabetes  Outcome: Progressing     Problem: Patient/Family Goals  Goal: Patient/Family Long Term Goal  Description: Patient's Long Term Goal: To get discharge home    Interventions:  - Help to initiate discharge process and communication  - See additional Care Plan goals for specific interventions  Outcome: Progressing  Goal: Patient/Family Short Term Goal  Description: Patient's Short Term Goal: to get colonoscopy    Interventions:   -Administer schedule prep on time, and prepare patient for colonoscopy  - See additional Care Plan goals for specific interventions  Outcome: Progressing     Problem: PAIN - ADULT  Goal: Verbalizes/displays adequate comfort level or patient's stated pain goal  Description: INTERVENTIONS:  - Encourage pt to monitor pain and request assistance  - Assess pain using appropriate pain scale  - Administer analgesics based on type and severity of pain and evaluate response  - Implement  non-pharmacological measures as appropriate and evaluate response  - Consider cultural and social influences on pain and pain management  - Manage/alleviate anxiety  - Utilize distraction and/or relaxation techniques  - Monitor for opioid side effects  - Notify MD/LIP if interventions unsuccessful or patient reports new pain  - Anticipate increased pain with activity and pre-medicate as appropriate  Outcome: Progressing     Problem: RISK FOR INFECTION - ADULT  Goal: Absence of fever/infection during anticipated neutropenic period  Description: INTERVENTIONS  - Monitor WBC  - Administer growth factors as ordered  - Implement neutropenic guidelines  Outcome: Progressing     Problem: SAFETY ADULT - FALL  Goal: Free from fall injury  Description: INTERVENTIONS:  - Assess pt frequently for physical needs  - Identify cognitive and physical deficits and behaviors that affect risk of falls.  - [place] fall precautions as indicated by assessment.  - Educate pt/family on patient safety including physical limitations  - Instruct pt to call for assistance with activity based on assessment  - Modify environment to reduce risk of injury  - Provide assistive devices as appropriate  - Consider OT/PT consult to assist with strengthening/mobility  - Encourage toileting schedule  Outcome: Progressing     Problem: DISCHARGE PLANNING  Goal: Discharge to home or other facility with appropriate resources  Description: INTERVENTIONS:  - Identify barriers to discharge w/pt and caregiver  - Include patient/family/discharge partner in discharge planning  - Arrange for needed discharge resources and transportation as appropriate  - Identify discharge learning needs (meds, wound care, etc)  - Arrange for interpreters to assist at discharge as needed  - Consider post-discharge preferences of patient/family/discharge partner  - Complete POLST form as appropriate  - Assess patient's ability to be responsible for managing their own health  -  Refer to Case Management Department for coordinating discharge planning if the patient needs post-hospital services based on physician/LIP order or complex needs related to functional status, cognitive ability or social support system  Outcome: Marcia Levin was up and ambulating well in room, voiding freely in the bathroom. IVF infusing. He denies any pain or discomfort at this time. He's tolerating clear liquid diet. Accucheck AC/HS done. HD MWF. Plan for discharge back to home when medically stable.     [de-identified] : Ms. Kaufman is a 47-year-old female with PMHx of HTN, DLD, DM, acute CVA s/p TNK, is here for discussion regarding long-term monitoring with ILR.     Accompanied by .    Denies chest pain, shortness of breath, palpitation, dizziness or LOC except noted above.  EKG (09/26/2024): SR 89, , QRA 80, QTc 397  Echo (06/2024): EF 65% Cardio: none

## 2024-09-28 ENCOUNTER — APPOINTMENT (OUTPATIENT)
Dept: CT IMAGING | Facility: HOSPITAL | Age: 79
End: 2024-09-28
Attending: EMERGENCY MEDICINE
Payer: MEDICARE

## 2024-09-28 ENCOUNTER — APPOINTMENT (OUTPATIENT)
Dept: GENERAL RADIOLOGY | Facility: HOSPITAL | Age: 79
End: 2024-09-28
Attending: EMERGENCY MEDICINE
Payer: MEDICARE

## 2024-09-28 ENCOUNTER — APPOINTMENT (OUTPATIENT)
Dept: CT IMAGING | Facility: HOSPITAL | Age: 79
End: 2024-09-28
Attending: ORTHOPAEDIC SURGERY
Payer: MEDICARE

## 2024-09-28 ENCOUNTER — HOSPITAL ENCOUNTER (INPATIENT)
Facility: HOSPITAL | Age: 79
LOS: 24 days | Discharge: NURSING, REHAB OR LONG-TERM CARE | End: 2024-10-22
Attending: EMERGENCY MEDICINE | Admitting: INTERNAL MEDICINE
Payer: MEDICARE

## 2024-09-28 ENCOUNTER — TELEPHONE (OUTPATIENT)
Dept: CARE COORDINATION | Age: 79
End: 2024-09-28

## 2024-09-28 DIAGNOSIS — S72.002A CLOSED FRACTURE OF LEFT HIP, INITIAL ENCOUNTER (HCC): Primary | ICD-10-CM

## 2024-09-28 DIAGNOSIS — S72.002D CLOSED FRACTURE OF LEFT HIP WITH ROUTINE HEALING, SUBSEQUENT ENCOUNTER: ICD-10-CM

## 2024-09-28 PROBLEM — N18.6 ESRD (END STAGE RENAL DISEASE) (HCC): Status: ACTIVE | Noted: 2024-09-28

## 2024-09-28 PROBLEM — N18.6 ESRD (END STAGE RENAL DISEASE) (HCC): Status: ACTIVE | Noted: 2024-01-01

## 2024-09-28 LAB
ALBUMIN SERPL-MCNC: 3.9 G/DL (ref 3.2–4.8)
ALBUMIN/GLOB SERPL: 1.2 {RATIO} (ref 1–2)
ALP LIVER SERPL-CCNC: 75 U/L
ALT SERPL-CCNC: 17 U/L
ANION GAP SERPL CALC-SCNC: 7 MMOL/L (ref 0–18)
ANTIBODY SCREEN: NEGATIVE
AST SERPL-CCNC: 30 U/L (ref ?–34)
BASOPHILS # BLD AUTO: 0.04 X10(3) UL (ref 0–0.2)
BASOPHILS NFR BLD AUTO: 0.4 %
BILIRUB SERPL-MCNC: 0.5 MG/DL (ref 0.2–1.1)
BUN BLD-MCNC: 59 MG/DL (ref 9–23)
BUN/CREAT SERPL: 12.2 (ref 10–20)
CALCIUM BLD-MCNC: 10.2 MG/DL (ref 8.7–10.4)
CHLORIDE SERPL-SCNC: 103 MMOL/L (ref 98–112)
CO2 SERPL-SCNC: 31 MMOL/L (ref 21–32)
CREAT BLD-MCNC: 4.85 MG/DL
DEPRECATED RDW RBC AUTO: 55.1 FL (ref 35.1–46.3)
EGFRCR SERPLBLD CKD-EPI 2021: 12 ML/MIN/1.73M2 (ref 60–?)
EOSINOPHIL # BLD AUTO: 0.16 X10(3) UL (ref 0–0.7)
EOSINOPHIL NFR BLD AUTO: 1.8 %
ERYTHROCYTE [DISTWIDTH] IN BLOOD BY AUTOMATED COUNT: 16 % (ref 11–15)
GLOBULIN PLAS-MCNC: 3.3 G/DL (ref 2–3.5)
GLUCOSE BLD-MCNC: 178 MG/DL (ref 70–99)
GLUCOSE BLDC GLUCOMTR-MCNC: 160 MG/DL (ref 70–99)
GLUCOSE BLDC GLUCOMTR-MCNC: 182 MG/DL (ref 70–99)
HCT VFR BLD AUTO: 35.5 %
HGB BLD-MCNC: 11.8 G/DL
IMM GRANULOCYTES # BLD AUTO: 0.06 X10(3) UL (ref 0–1)
IMM GRANULOCYTES NFR BLD: 0.7 %
LYMPHOCYTES # BLD AUTO: 1.02 X10(3) UL (ref 1–4)
LYMPHOCYTES NFR BLD AUTO: 11.3 %
MCH RBC QN AUTO: 30.9 PG (ref 26–34)
MCHC RBC AUTO-ENTMCNC: 33.2 G/DL (ref 31–37)
MCV RBC AUTO: 92.9 FL
MONOCYTES # BLD AUTO: 0.39 X10(3) UL (ref 0.1–1)
MONOCYTES NFR BLD AUTO: 4.3 %
MRSA NASAL: NEGATIVE
NEUTROPHILS # BLD AUTO: 7.38 X10 (3) UL (ref 1.5–7.7)
NEUTROPHILS # BLD AUTO: 7.38 X10(3) UL (ref 1.5–7.7)
NEUTROPHILS NFR BLD AUTO: 81.5 %
OSMOLALITY SERPL CALC.SUM OF ELEC: 313 MOSM/KG (ref 275–295)
PLATELET # BLD AUTO: 170 10(3)UL (ref 150–450)
POTASSIUM SERPL-SCNC: 3.9 MMOL/L (ref 3.5–5.1)
PROT SERPL-MCNC: 7.2 G/DL (ref 5.7–8.2)
RBC # BLD AUTO: 3.82 X10(6)UL
RH BLOOD TYPE: POSITIVE
SODIUM SERPL-SCNC: 141 MMOL/L (ref 136–145)
STAPH A BY PCR: NEGATIVE
WBC # BLD AUTO: 9.1 X10(3) UL (ref 4–11)

## 2024-09-28 PROCEDURE — 70450 CT HEAD/BRAIN W/O DYE: CPT | Performed by: EMERGENCY MEDICINE

## 2024-09-28 PROCEDURE — 99223 1ST HOSP IP/OBS HIGH 75: CPT | Performed by: INTERNAL MEDICINE

## 2024-09-28 PROCEDURE — 71045 X-RAY EXAM CHEST 1 VIEW: CPT | Performed by: EMERGENCY MEDICINE

## 2024-09-28 PROCEDURE — 72125 CT NECK SPINE W/O DYE: CPT | Performed by: EMERGENCY MEDICINE

## 2024-09-28 PROCEDURE — 99223 1ST HOSP IP/OBS HIGH 75: CPT | Performed by: HOSPITALIST

## 2024-09-28 PROCEDURE — 73502 X-RAY EXAM HIP UNI 2-3 VIEWS: CPT | Performed by: EMERGENCY MEDICINE

## 2024-09-28 PROCEDURE — 73030 X-RAY EXAM OF SHOULDER: CPT | Performed by: EMERGENCY MEDICINE

## 2024-09-28 PROCEDURE — 73700 CT LOWER EXTREMITY W/O DYE: CPT | Performed by: ORTHOPAEDIC SURGERY

## 2024-09-28 RX ORDER — CARVEDILOL 25 MG/1
25 TABLET ORAL 2 TIMES DAILY
Status: DISCONTINUED | OUTPATIENT
Start: 2024-09-28 | End: 2024-09-30

## 2024-09-28 RX ORDER — FLUTICASONE PROPIONATE 50 MCG
2 SPRAY, SUSPENSION (ML) NASAL DAILY PRN
Status: DISCONTINUED | OUTPATIENT
Start: 2024-09-28 | End: 2024-10-22

## 2024-09-28 RX ORDER — FLUTICASONE PROPIONATE AND SALMETEROL 250; 50 UG/1; UG/1
1 POWDER RESPIRATORY (INHALATION) 2 TIMES DAILY
Status: DISCONTINUED | OUTPATIENT
Start: 2024-09-28 | End: 2024-10-22

## 2024-09-28 RX ORDER — ACETAMINOPHEN 500 MG
500 TABLET ORAL DAILY PRN
Status: DISCONTINUED | OUTPATIENT
Start: 2024-09-28 | End: 2024-09-30

## 2024-09-28 RX ORDER — HYDROCODONE BITARTRATE AND ACETAMINOPHEN 5; 325 MG/1; MG/1
1 TABLET ORAL ONCE
Status: COMPLETED | OUTPATIENT
Start: 2024-09-28 | End: 2024-09-28

## 2024-09-28 RX ORDER — DEXTROSE MONOHYDRATE 25 G/50ML
50 INJECTION, SOLUTION INTRAVENOUS
Status: DISCONTINUED | OUTPATIENT
Start: 2024-09-28 | End: 2024-10-22

## 2024-09-28 RX ORDER — HEPARIN SODIUM 5000 [USP'U]/ML
5000 INJECTION, SOLUTION INTRAVENOUS; SUBCUTANEOUS ONCE
Status: COMPLETED | OUTPATIENT
Start: 2024-09-28 | End: 2024-09-28

## 2024-09-28 RX ORDER — MORPHINE SULFATE 4 MG/ML
4 INJECTION, SOLUTION INTRAMUSCULAR; INTRAVENOUS EVERY 2 HOUR PRN
Status: DISCONTINUED | OUTPATIENT
Start: 2024-09-28 | End: 2024-09-30

## 2024-09-28 RX ORDER — NICOTINE POLACRILEX 4 MG
15 LOZENGE BUCCAL
Status: DISCONTINUED | OUTPATIENT
Start: 2024-09-28 | End: 2024-10-22

## 2024-09-28 RX ORDER — ESCITALOPRAM OXALATE 5 MG/1
5 TABLET ORAL DAILY
Status: DISCONTINUED | OUTPATIENT
Start: 2024-09-29 | End: 2024-09-29

## 2024-09-28 RX ORDER — ONDANSETRON 2 MG/ML
4 INJECTION INTRAMUSCULAR; INTRAVENOUS ONCE
Status: COMPLETED | OUTPATIENT
Start: 2024-09-28 | End: 2024-09-28

## 2024-09-28 RX ORDER — SODIUM CHLORIDE 9 MG/ML
INJECTION, SOLUTION INTRAVENOUS CONTINUOUS
Status: DISCONTINUED | OUTPATIENT
Start: 2024-09-28 | End: 2024-09-29

## 2024-09-28 RX ORDER — MORPHINE SULFATE 2 MG/ML
1 INJECTION, SOLUTION INTRAMUSCULAR; INTRAVENOUS EVERY 2 HOUR PRN
Status: DISCONTINUED | OUTPATIENT
Start: 2024-09-28 | End: 2024-09-30

## 2024-09-28 RX ORDER — ALBUTEROL SULFATE 90 UG/1
2 INHALANT RESPIRATORY (INHALATION) EVERY 4 HOURS PRN
Status: DISCONTINUED | OUTPATIENT
Start: 2024-09-28 | End: 2024-10-22

## 2024-09-28 RX ORDER — IPRATROPIUM BROMIDE AND ALBUTEROL SULFATE 2.5; .5 MG/3ML; MG/3ML
3 SOLUTION RESPIRATORY (INHALATION) EVERY 6 HOURS PRN
Status: DISCONTINUED | OUTPATIENT
Start: 2024-09-28 | End: 2024-10-09

## 2024-09-28 RX ORDER — NICOTINE POLACRILEX 4 MG
30 LOZENGE BUCCAL
Status: DISCONTINUED | OUTPATIENT
Start: 2024-09-28 | End: 2024-10-22

## 2024-09-28 RX ORDER — CETIRIZINE HYDROCHLORIDE 5 MG/1
5 TABLET ORAL DAILY
Status: DISCONTINUED | OUTPATIENT
Start: 2024-09-28 | End: 2024-09-30

## 2024-09-28 RX ORDER — DILTIAZEM HYDROCHLORIDE 5 MG/ML
20 INJECTION INTRAVENOUS ONCE
Status: COMPLETED | OUTPATIENT
Start: 2024-09-28 | End: 2024-09-28

## 2024-09-28 RX ORDER — CLOPIDOGREL BISULFATE 75 MG/1
75 TABLET ORAL DAILY
COMMUNITY

## 2024-09-28 RX ORDER — ASPIRIN 81 MG/1
81 TABLET ORAL DAILY
Status: DISCONTINUED | OUTPATIENT
Start: 2024-09-28 | End: 2024-09-28

## 2024-09-28 RX ORDER — PANTOPRAZOLE SODIUM 40 MG/1
40 TABLET, DELAYED RELEASE ORAL
Status: DISCONTINUED | OUTPATIENT
Start: 2024-09-29 | End: 2024-09-30

## 2024-09-28 RX ORDER — MORPHINE SULFATE 2 MG/ML
2 INJECTION, SOLUTION INTRAMUSCULAR; INTRAVENOUS EVERY 2 HOUR PRN
Status: DISCONTINUED | OUTPATIENT
Start: 2024-09-28 | End: 2024-09-30

## 2024-09-28 RX ORDER — BUDESONIDE AND FORMOTEROL FUMARATE DIHYDRATE 160; 4.5 UG/1; UG/1
2 AEROSOL RESPIRATORY (INHALATION) 2 TIMES DAILY
Status: DISCONTINUED | OUTPATIENT
Start: 2024-09-28 | End: 2024-09-28 | Stop reason: ALTCHOICE

## 2024-09-28 RX ORDER — ATORVASTATIN CALCIUM 40 MG/1
40 TABLET, FILM COATED ORAL NIGHTLY
Status: DISCONTINUED | OUTPATIENT
Start: 2024-09-28 | End: 2024-09-30

## 2024-09-28 RX ORDER — MORPHINE SULFATE 4 MG/ML
4 INJECTION, SOLUTION INTRAMUSCULAR; INTRAVENOUS ONCE
Status: COMPLETED | OUTPATIENT
Start: 2024-09-28 | End: 2024-09-28

## 2024-09-28 NOTE — CONSULTS
[facility]  part of [facility]    Report of Consultation    Ollie Hernández Patient Status:  Inpatient    1945 MRN [id number]   Location [facility] 4W/SW/SE Attending Darlin Phipps MD   Hosp Day # 0 PCP Wili Parmar MD     Date of Admission:  2024  Date of Consult:  2024  Reason for Consultation:   ESRD    History of Present Illness:   Patient is a 79 year old male who was admitted to the hospital for Closed fracture of left hip, initial encounter (HCC):  Patient fell broke left hip history of ESRD dialysis  and Friday had dialysis yesterday history of diabetes hypertension recent chronic GI bleeds atrial fibrillation coronary artery disease.  Has been in the hospital many times for blood transfusions due to bleeding has been on and off blood thinners  Currently on aspirin and Plavix feels okay but displaced fracture left femur  Past Medical History  Past Medical History:    Anemia    Asthma (HCC)    Back problem    BPH (benign prostatic hyperplasia)    Calculus of kidney    Cataract    Coronary atherosclerosis    Diabetes (HCC)    ESRD (end stage renal disease) on dialysis (HCC)    Essential hypertension    High blood pressure    High cholesterol    History of blood transfusion    Hyperlipidemia    Neuropathy    hands and feet    KRAIG on CPAP    Renal disorder    Sleep apnea    Visual impairment    glasses    Vocal cord paralysis, unilateral partial       Past Surgical History  Past Surgical History:   Procedure Laterality Date    Appendectomy          Back surgery      Neck/back -     Capsule endoscopy - internal referral      Cataract      2021 and 2022    Cath bare metal stent (bms)      Colonoscopy      Colonoscopy N/A 2021    Procedure: COLONOSCOPY;  Surgeon: Michael Gautam MD;  Location: [facility] ENDO    Colonoscopy N/A 2024    Procedure: COLONOSCOPY;  Surgeon: Gabriel Saldana MD;  Location: [facility] ENDOSCOPY     Colonoscopy N/A 06/15/2024    Procedure: COLONOSCOPY;  Surgeon: Carlyn Storey MD;  Location: [facility] ENDOSCOPY    Colonoscopy N/A 2024    Procedure: COLONOSCOPY;  Surgeon: Gabriel Saldana MD;  Location: [facility] ENDOSCOPY    Hand/finger surgery unlisted      Accidental trauma    Spine surgery procedure unlisted      Upper gi endoscopy,diagnosis         Family History  Family History   Problem Relation Age of Onset    Cancer Father         Kidney    Breast Cancer Mother     Diabetes Mother     Diabetes Maternal Grandmother     Diabetes Maternal Grandfather     Heart Disorder Other         Uncle       Social History  Social History     Socioeconomic History    Marital status:      Spouse name: Not on file    Number of children: Not on file    Years of education: Not on file    Highest education level: Not on file   Occupational History    Not on file   Tobacco Use    Smoking status: Former     Current packs/day: 0.00     Average packs/day: 1 pack/day for 17.0 years (17.0 ttl pk-yrs)     Types: Cigarettes     Start date: 1964     Quit date: 1981     Years since quittin.7    Smokeless tobacco: Never   Vaping Use    Vaping status: Never Used   Substance and Sexual Activity    Alcohol use: No     Alcohol/week: 0.0 standard drinks of alcohol    Drug use: No    Sexual activity: Yes     Partners: Female   Other Topics Concern    Not on file   Social History Narrative    Not on file     Social Determinants of Health     Financial Resource Strain: Low Risk  (2024)    Received from [facility]    Financial Resource Strain     In the past year, have you or any family members you live with been unable to get any of the following when it was really needed? Check all that apply.: None   Food Insecurity: No Food Insecurity (2024)    Food Insecurity     Food Insecurity: Never true   Transportation Needs: No Transportation Needs (2024)    Transportation Needs     Lack of Transportation: No      Car Seat: Not on file   Recent Concern: Transportation Needs - At Risk (9/11/2024)    Received from [facility] [place]    Transportation Needs     In the past 12 months, has lack of reliable transportation kept you from medical appointments, meetings, work or from getting things needed for daily living? : Yes   Physical Activity: Not on file   Stress: Not on file   Social Connections: Low Risk  (9/11/2024)    Received from [facility] [place]    Social Connections     How often do you see or talk to people that you care about and feel close to? (For example: talking to friends on the phone, visiting friends or family, going to Worship or club meetings): 5 or more times a week   Housing Stability: Low Risk  (9/28/2024)    Housing Stability     Housing Instability: No     Housing Instability Emergency: Not on file     Crib or Bassinette: Not on file          Current Medications:  Current Facility-Administered Medications   Medication Dose Route Frequency    [START ON 9/29/2024] ceFAZolin (Ancef) 2g in 10mL IV syringe premix  2 g Intravenous 30 Min Pre-Op    albuterol (Ventolin HFA) 108 (90 Base) MCG/ACT inhaler 2 puff  2 puff Inhalation Q4H PRN    acetaminophen (Tylenol Extra Strength) tab 500 mg  500 mg Oral Daily PRN    atorvastatin (Lipitor) tab 40 mg  40 mg Oral Nightly    carvedilol (Coreg) tab 25 mg  25 mg Oral BID    cetirizine (ZyrTEC) tab 5 mg  5 mg Oral Daily    [START ON 9/29/2024] escitalopram (Lexapro) tab 5 mg  5 mg Oral Daily    fluticasone propionate (Flonase) 50 MCG/ACT nasal suspension 2 spray  2 spray Nasal Daily PRN    [START ON 9/29/2024] pantoprazole (Protonix) DR tab 40 mg  40 mg Oral Before breakfast    glucose (Dex4) 15 GM/59ML oral liquid 15 g  15 g Oral Q15 Min PRN    Or    glucose (Glutose) 40% oral gel 15 g  15 g Oral Q15 Min PRN    Or    glucose-vitamin C (Dex-4) chewable tab 4 tablet  4 tablet Oral Q15 Min PRN    Or    dextrose 50% injection 50 mL  50 mL Intravenous Q15 Min PRN     Or    glucose (Dex4) 15 GM/59ML oral liquid 30 g  30 g Oral Q15 Min PRN    Or    glucose (Glutose) 40% oral gel 30 g  30 g Oral Q15 Min PRN    Or    glucose-vitamin C (Dex-4) chewable tab 8 tablet  8 tablet Oral Q15 Min PRN    insulin aspart (NovoLOG) 100 Units/mL FlexPen 1-7 Units  1-7 Units Subcutaneous TID CC    sodium chloride 0.9% infusion   Intravenous Continuous    morphINE PF 2 MG/ML injection 1 mg  1 mg Intravenous Q2H PRN    Or    morphINE PF 2 MG/ML injection 2 mg  2 mg Intravenous Q2H PRN    Or    morphINE PF 4 MG/ML injection 4 mg  4 mg Intravenous Q2H PRN    ipratropium-albuterol (Duoneb) 0.5-2.5 (3) MG/3ML inhalation solution 3 mL  3 mL Nebulization Q6H PRN    fluticasone-salmeterol (Advair Diskus) 250-50 MCG/ACT inhaler 1 puff  1 puff Inhalation BID     Medications Prior to Admission   Medication Sig    clopidogrel 75 MG Oral Tab Take 1 tablet (75 mg total) by mouth daily.    escitalopram 5 MG Oral Tab Take 1 tablet (5 mg total) by mouth daily.    linaGLIPtin (TRADJENTA) 5 mg Oral Tab Take 1 tablet (5 mg total) by mouth daily.    atorvastatin 40 MG Oral Tab Take 1 tablet (40 mg total) by mouth nightly.    felodipine ER 10 MG Oral Tablet 24 Hr Take 1 tablet (10 mg total) by mouth daily.    PANTOPRAZOLE 40 MG Oral Tab EC TAKE 1 TABLET BY MOUTH EVERY  MORNING BEFORE BREAKFAST    aspirin 81 MG Oral Tab EC Take 1 tablet (81 mg total) by mouth daily.    carvedilol 25 MG Oral Tab Take 1 tablet (25 mg total) by mouth 2 (two) times daily.    acetaminophen 500 MG Oral Tab Take 1 tablet (500 mg total) by mouth daily as needed for Pain.    Cholecalciferol 125 MCG (5000 UT) Oral Tab Take 1 tablet (5,000 Units total) by mouth 2 (two) times daily.    polyethylene glycol, PEG 3350, 17 g Oral Powd Pack daily as needed.    fluticasone propionate 50 MCG/ACT Nasal Suspension 2 sprays by Nasal route daily. (Patient taking differently: 2 sprays by Nasal route daily as needed.)    albuterol (PROAIR HFA) 108 (90 Base)  MCG/ACT Inhalation Aero Soln Inhale 2 puffs into the lungs every 4 (four) hours as needed for Wheezing.    Budesonide-Formoterol Fumarate (SYMBICORT) 160-4.5 MCG/ACT Inhalation Aerosol Inhale 2 puffs into the lungs 2 (two) times daily.    cetirizine 10 MG Oral Tab Take 1 tablet (10 mg total) by mouth every other day.       Allergies  Allergies   Allergen Reactions    Adhesive Tape OTHER (SEE COMMENTS)     Severe rashes    Dust Mite Extract RASH         Review of Systems:   Constitutional: negative for fatigue, fevers and weight loss  Eyes: negative for irritation, redness and visual disturbance  Ears, nose, mouth, throat, and face: negative for hearing loss and sore throat  Respiratory: negative for cough, hemoptysis and wheezing  Cardiovascular: negative for chest pain, exertional dyspnea, lower extremity edema and palpitations  Gastrointestinal: negative for abdominal pain, diarrhea and nausea  Genitourinary:negative for dysuria, frequency and hematuria  Hematologic/lymphatic: negative for bleeding and easy bruising  Musculoskeletal pain in left hip  Neurological: negative for gait problems, memory problems and seizures  Behavioral/Psych: negative for anxiety and depression  Endocrine: negative for polyuria and weight loss     Physical Exam:   Blood pressure (!) 187/64, pulse 72, temperature 98.1 °F (36.7 °C), temperature source Temporal, resp. rate 26, weight 155 lb 11.2 oz (70.6 kg), SpO2 90%.  No intake or output data in the 24 hours ending 09/28/24 1804  Wt Readings from Last 3 Encounters:   09/28/24 155 lb 11.2 oz (70.6 kg)   08/22/24 159 lb 12.8 oz (72.5 kg)   08/20/24 158 lb (71.7 kg)       General appearance: alert, appears stated age and cooperative  Head: Normocephalic, atraumatic  Eyes: conjunctivae/corneas clear  Throat: lips, mucosa, and tongue normal; teeth and gums normal  Neck:  no JVD, supple, symmetrical  Pulmonary:  clear to auscultation bilaterally  Cardiovascular: S1, S2 normal, no rub or  gallop, regular rate and rhythm  Abdominal: soft, non-tender; non distended, bowel sounds normal   Extremities: extremities normal, no edema  Pulses: pedal pulses palpable  Skin: No rashes or lesions  Lymph nodes: Cervical, supraclavicular normal.  Neurologic: Grossly normal  Psychiatric: calm    Results:     Laboratory Data:  Lab Results   Component Value Date    WBC 9.1 09/28/2024    HGB 11.8 (L) 09/28/2024    HCT 35.5 (L) 09/28/2024    .0 09/28/2024    CREATSERUM 4.85 (H) 09/28/2024    BUN 59 (H) 09/28/2024     09/28/2024    K 3.9 09/28/2024     09/28/2024    CO2 31.0 09/28/2024     (H) 09/28/2024    CA 10.2 09/28/2024    ALB 3.9 09/28/2024    ALKPHO 75 09/28/2024    BILT 0.5 09/28/2024    TP 7.2 09/28/2024    AST 30 09/28/2024    ALT 17 09/28/2024    PTT 30.1 08/09/2024    INR 1.45 (H) 08/09/2024    T4F 0.9 08/31/2022    TSH 2.844 07/04/2024     (H) 07/30/2024    PSA 2.94 10/20/2021    ESRML 10 06/16/2024    CRP 1.30 (H) 06/16/2024    MG 1.7 08/01/2024    PHOS 5.0 08/01/2024    TROP <0.045 07/25/2019     08/05/2023    B12 672 07/04/2024       Imaging:  [unfilled]   XR CHEST AP PORTABLE  (CPT=71045)    Result Date: 9/28/2024  CONCLUSION:   Mild cardiomegaly with interstitial and alveolar pulmonary edema.  Other superimposed infiltrate not excluded.      Dictated by (CST): Apple Pierre MD on 9/28/2024 at 3:25 PM     Finalized by (CST): Apple Pierre MD on 9/28/2024 at 3:26 PM          XR SHOULDER, COMPLETE (MIN 2 VIEWS), LEFT (CPT=73030)    Result Date: 9/28/2024  CONCLUSION:   No acute fracture or dislocation.  1.3 cm glenohumeral joint ossification, may represent a prominent osteophyte or a joint body.    Dictated by (CST): Apple Pierre MD on 9/28/2024 at 1:57 PM     Finalized by (CST): Apple Pierre MD on 9/28/2024 at 1:58 PM          XR HIP W OR WO PELVIS 2 OR 3 VIEWS, LEFT (CPT=73502)    Result Date: 9/28/2024  CONCLUSION:   Acute moderately impacted,  mildly displaced fracture of the intertrochanteric left femur, as above.   Left hip joint space is maintained.      Dictated by (CST): Apple Pierre MD on 9/28/2024 at 1:56 PM     Finalized by (CST): Apple Pierre MD on 9/28/2024 at 1:57 PM          CT SPINE CERVICAL (CPT=72125)    Result Date: 9/28/2024  CONCLUSION:   No acute fracture or traumatic listhesis of the cervical spine.  Multiple additional chronic findings are not significantly changed when compared to 01/27/2023 and are described in detail above.    Dictated by (CST): Vernon Law MD on 9/28/2024 at 1:24 PM     Finalized by (CST): Vernon Law MD on 9/28/2024 at 1:30 PM          CT BRAIN OR HEAD (CPT=70450)    Result Date: 9/28/2024  CONCLUSION:   1. No acute intracranial hemorrhage, midline shift, mass effect, or hydrocephalus. 2. No transcortical area of hypoattenuation to suggest an acute infarct.  If there is clinical concern for an acute infarct, consider further evaluation with an MRI of the brain. 3. Moderate chronic microvascular ischemic changes with a small chronic left thalamus infarct. 4. No displaced calvarial fracture. 5. Lesser incidental findings described above.    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)    Atherosclerosis of native arteries of  extremities with intermittent claudication, bilateral legs ([facility])    Primary hypertension    Smokers' cough (HCC)    Unstable angina (HCC)    Lower GI bleed    ESRD (end stage renal disease) on dialysis ([facility])    PAF (paroxysmal atrial fibrillation) ([facility])    AVM (arteriovenous malformation) of colon    ABLA (acute blood loss anemia)    Rectal bleeding    Anticoagulated    Antiplatelet or antithrombotic long-term use    Lower GI bleeding    ESRD on hemodialysis ([facility])    GI bleed    Closed fracture of left hip, initial encounter ([facility])    ESRD (end stage renal disease) ([facility])        Recommendations:  1 ESRD labs okay dialysis Monday  #2 hip fracture possibly going for surgery tomorrow  #3 history of GI bleeding  Globin actually excellent 11.8  #4 diabetes  #5 hypertension keep pain under good control from hip    Will follow-up  Thank you for allowing me to participate in the care of your patient.    José Callahan MD  9/28/2024

## 2024-09-28 NOTE — ED PROVIDER NOTES
Patient Seen in: [facility] Emergency Department    History     Chief Complaint   Patient presents with    Fall       HPI    79-year-old male with past medical history significant for BPH, incisional disease on dialysis, diabetes, CAD, high blood pressure, high cholesterol, sleep apnea, obesity, presents ED for evaluation of left hip pain, left shoulder pain, head injury, after he lost his balance and fell when he was putting on his shoes.  He denies any loss of consciousness, headache, nausea.  He states that any attempt to move his left hip causes significant discomfort.  He denies any chest pain or palpitations.    History from [facility]: Initial history given by EMS was that patient had fallen in the bathroom when trying to get his shoes on.  They state that patient had a Watchman procedure completed 2 weeks ago for atrial fibrillation.    External Records Reviewed: Reviewed prior records available in EMR and patient was seen at [facility] on 11 March and had Watchman procedure completed at that time.  Postoperative course was uneventful.    History reviewed.   Past Medical History:    Anemia    Asthma (HCC)    Back problem    BPH (benign prostatic hyperplasia)    Calculus of kidney    Cataract    Coronary atherosclerosis    Diabetes (HCC)    ESRD (end stage renal disease) on dialysis (HCC)    Essential hypertension    High blood pressure    High cholesterol    History of blood transfusion    Hyperlipidemia    Neuropathy    hands and feet    KRAIG on CPAP    Renal disorder    Sleep apnea    Visual impairment    glasses    Vocal cord paralysis, unilateral partial       History reviewed.   Past Surgical History:   Procedure Laterality Date    Appendectomy      1981    Back surgery      Neck/back - 1998    Capsule endoscopy - internal referral      Cataract      12/2021 and 1/2022    Cath bare metal stent (bms)      Colonoscopy      Colonoscopy N/A 01/25/2021    Procedure: COLONOSCOPY;   Surgeon: Michael Gautam MD;  Location: [facility] ENDO    Colonoscopy N/A 2024    Procedure: COLONOSCOPY;  Surgeon: Gabriel Saldana MD;  Location: [facility] ENDOSCOPY    Colonoscopy N/A 06/15/2024    Procedure: COLONOSCOPY;  Surgeon: Carlyn Storey MD;  Location: [facility] ENDOSCOPY    Colonoscopy N/A 2024    Procedure: COLONOSCOPY;  Surgeon: Gabriel Saldana MD;  Location: [facility] ENDOSCOPY    Hand/finger surgery unlisted      Accidental trauma    Spine surgery procedure unlisted      Upper gi endoscopy,diagnosis           Medications :  (Not in a hospital admission)       Family History   Problem Relation Age of Onset    Cancer Father         Kidney    Breast Cancer Mother     Diabetes Mother     Diabetes Maternal Grandmother     Diabetes Maternal Grandfather     Heart Disorder Other         Uncle       Smoking Status:   Social History     Socioeconomic History    Marital status:    Tobacco Use    Smoking status: Former     Current packs/day: 0.00     Average packs/day: 1 pack/day for 17.0 years (17.0 ttl pk-yrs)     Types: Cigarettes     Start date: 1964     Quit date: 1981     Years since quittin.7    Smokeless tobacco: Never   Vaping Use    Vaping status: Never Used   Substance and Sexual Activity    Alcohol use: No     Alcohol/week: 0.0 standard drinks of alcohol    Drug use: No    Sexual activity: Yes     Partners: Female       Constitutional and vital signs reviewed.      Social History and Family History elements reviewed from today, pertinent positives to the presenting problem noted.    Physical Exam     ED Triage Vitals   BP 24 1204 127/48   Pulse 24 1204 68   Resp 24 1204 18   Temp 24 1203 98.1 °F (36.7 °C)   Temp src 24 1203 Temporal   SpO2 24 1204 94 %   O2 Device 24 1503 None (Room air)       Physical Exam   Constitutional: AAOx3, well nourished, NAD  HEENT: Normocephalic, PERRLA, MMM  CV: s1s2+, RRR, no m/r/g, normal distal  pulses  Pulmonary/Chest: CTA b/l with no rales, wheezes.  No chest wall tenderness  Abdominal: Nontender.  Nondistended. Soft. Bowel sounds are normal.   Neck/Back:   :   Musculoskeletal: Left upper extremity with normal range of motion.  There is abrasion noted to the deltoid.  Left lower extremity with significant tenderness to palpation at the hip joint.  Unable to range on his own.  2+ DP pulse  Neurological: Awake, alert. Normal reflexes. No cranial nerve deficit.    Skin: Skin is warm and dry. No rash noted. No erythema.   Psychiatric:      All measures to prevent infection transmission during my interaction with the patient were taken. The patient was already wearing a droplet mask on my arrival to the room. Personal protective equipment was worn throughout the duration of the exam.      ED Course        Labs Reviewed   CBC WITH DIFFERENTIAL WITH PLATELET - Abnormal; Notable for the following components:       Result Value    HGB 11.8 (*)     HCT 35.5 (*)     RDW-SD 55.1 (*)     RDW 16.0 (*)     All other components within normal limits   COMP METABOLIC PANEL (14) - Abnormal; Notable for the following components:    Glucose 178 (*)     BUN 59 (*)     Creatinine 4.85 (*)     Calculated Osmolality 313 (*)     eGFR-Cr 12 (*)     All other components within normal limits   URINALYSIS WITH CULTURE REFLEX   RAINBOW DRAW LAVENDER   RAINBOW DRAW LIGHT GREEN   RAINBOW DRAW BLUE   MRSA/SA SCRN BY PCR:[facility] ORTHO SURG ONLY     My Independent Interpretation of EKG (if performed): EKG    Rate, intervals and axes as noted on EKG Report.  Rate: 76 bpm  Rhythm: Sinus Rhythm  Reading: Normal sinus rhythm, occasional PVCs, no acute ST changes, normal axis and intervals             Monitor Interpretation:   normal sinus rhythm, occasional premature ventricular beats as interpreted by me.      Imaging Results Available and Reviewed while in ED: XR SHOULDER, COMPLETE (MIN 2 VIEWS), LEFT (CPT=73030)    Result Date:  9/28/2024  CONCLUSION:   No acute fracture or dislocation.  1.3 cm glenohumeral joint ossification, may represent a prominent osteophyte or a joint body.    Dictated by (CST): Apple Pierre MD on 9/28/2024 at 1:57 PM     Finalized by (CST): Apple Pierre MD on 9/28/2024 at 1:58 PM          XR HIP W OR WO PELVIS 2 OR 3 VIEWS, LEFT (CPT=73502)    Result Date: 9/28/2024  CONCLUSION:   Acute moderately impacted, mildly displaced fracture of the intertrochanteric left femur, as above.   Left hip joint space is maintained.      Dictated by (CST): Apple Pierre MD on 9/28/2024 at 1:56 PM     Finalized by (CST): Apple Pierre MD on 9/28/2024 at 1:57 PM          CT SPINE CERVICAL (CPT=72125)    Result Date: 9/28/2024  CONCLUSION:   No acute fracture or traumatic listhesis of the cervical spine.  Multiple additional chronic findings are not significantly changed when compared to 01/27/2023 and are described in detail above.    Dictated by (CST): Vernon Law MD on 9/28/2024 at 1:24 PM     Finalized by (CST): Vernon Law MD on 9/28/2024 at 1:30 PM          CT BRAIN OR HEAD (CPT=70450)    Result Date: 9/28/2024  CONCLUSION:   1. No acute intracranial hemorrhage, midline shift, mass effect, or hydrocephalus. 2. No transcortical area of hypoattenuation to suggest an acute infarct.  If there is clinical concern for an acute infarct, consider further evaluation with an MRI of the brain. 3. Moderate chronic microvascular ischemic changes with a small chronic left thalamus infarct. 4. No displaced calvarial fracture. 5. Lesser incidental findings described above.    Dictated by (CST): Vernon Law MD on 9/28/2024 at 1:17 PM     Finalized by (CST): Vernon Law MD on 9/28/2024 at 1:24 PM         ED Medications Administered:   Medications   sodium chloride 0.9 % IV bolus 1,000 mL (1,000 mL Intravenous New Bag 9/28/24 5481)   HYDROcodone-acetaminophen (Norco) 5-325 MG per tab 1 tablet (1 tablet Oral Given  9/28/24 1245)   morphINE PF 4 MG/ML injection 4 mg (4 mg Intravenous Given 9/28/24 1448)   ondansetron (Zofran) 4 MG/2ML injection 4 mg (4 mg Intravenous Given 9/28/24 1448)             MDM     Vitals:    09/28/24 1204 09/28/24 1210 09/28/24 1215 09/28/24 1415   BP: 127/48  122/48 (!) 175/63   Pulse: 68  72 75   Resp: 18  20 20   Temp:       TempSrc:       SpO2: 94% 97% 92% 95%     *I personally reviewed and interpreted all ED vitals.    Independent Interpretation of Studies: I have independently reviewed patient's left hip x-ray and patient has an intertrochanteric fracture.  Left shoulder x-ray, CT brain, CT cervical spine, are all relatively unremarkable    Social Determinants of Health:     Procedures:      Differential/MDM/Shared Decision Making: Differential Diagnosis includes fracture, dislocation, intracranial hemorrhage, contusion, others.      The patient already has recent Watchman procedure, end-stage renal disease on dialysis, CAD, diabetes, BPH, high blood pressure, high cholesterol, sleep apnea, obesity, to contribute to the complexity of this ED evaluation.           Patient does have a left hip fracture on x-ray.  He has been given Norco and morphine for pain control.  Discussed with orthopedics and they will see for consult.  Discussed case with [facility] and they have accepted patient for admission.    Critical care time exclusive of procedure time spent on this patient was 40 min for taking history from patient examining patient, medical decision-making, reviewing lab work and radiology studies, explaining results to patient and family, discussing with consultants/admitting physician, and documenting in patient's chart.      Condition upon leaving the department: Stable    Disposition and Plan     Clinical Impression:  1. Closed fracture of left hip, initial encounter ([facility])        Disposition:  Admit    Follow-up:  No follow-up provider specified.    Medications Prescribed:  Current  Discharge Medication List          Hospital Problems       Present on Admission  Date Reviewed: 8/22/2024            ICD-10-CM Noted POA    * (Principal) Closed fracture of left hip, initial encounter ([facility]) S72.002A 9/28/2024 Unknown

## 2024-09-28 NOTE — H&P
[facility]  part of [facility]    History and Physical    Ollie Hernández Patient Status:  Inpatient    1945 MRN [id number]   Location [facility] 4W/SW/SE Attending Darlin Phipps MD   Hosp Day # 0 PCP Wili Parmar MD     Date:  2024  Date of Admission:  2024    History provided by: Patient  HPI:     Chief Complaint   Patient presents with    Fall     HPI    Patient is a 79-year-old male with past medical history of multiple comorbid conditions but come to the hospital.  Suffering a minimally displaced left femoral neck fracture.  Patient seen examined emergency department currently states that the pain is uncontrolled, orthopedic surgery has been placed on consult plan to get a CT scan of the hip.  Plan is for surgery for tomorrow.    Currently patient denies any chest pain shortness of breath palpitations nausea vomiting is endorsing pain    History     Past Medical History:    Anemia    Asthma (HCC)    Back problem    BPH (benign prostatic hyperplasia)    Calculus of kidney    Cataract    Coronary atherosclerosis    Diabetes (HCC)    ESRD (end stage renal disease) on dialysis (HCC)    Essential hypertension    High blood pressure    High cholesterol    History of blood transfusion    Hyperlipidemia    Neuropathy    hands and feet    KRAIG on CPAP    Renal disorder    Sleep apnea    Visual impairment    glasses    Vocal cord paralysis, unilateral partial     Past Surgical History:   Procedure Laterality Date    Appendectomy          Back surgery      Neck/back -     Capsule endoscopy - internal referral      Cataract      2021 and 2022    Cath bare metal stent (bms)      Colonoscopy      Colonoscopy N/A 2021    Procedure: COLONOSCOPY;  Surgeon: Michael Gautam MD;  Location: [facility] ENDO    Colonoscopy N/A 2024    Procedure: COLONOSCOPY;  Surgeon: Gabriel Saldana MD;  Location: [facility] ENDOSCOPY    Colonoscopy N/A 06/15/2024    Procedure:  COLONOSCOPY;  Surgeon: Carlyn Storey MD;  Location: [facility] ENDOSCOPY    Colonoscopy N/A 2024    Procedure: COLONOSCOPY;  Surgeon: Gabriel Saldana MD;  Location: [facility] ENDOSCOPY    Hand/finger surgery unlisted      Accidental trauma    Spine surgery procedure unlisted      Upper gi endoscopy,diagnosis       Family History   Problem Relation Age of Onset    Cancer Father         Kidney    Breast Cancer Mother     Diabetes Mother     Diabetes Maternal Grandmother     Diabetes Maternal Grandfather     Heart Disorder Other         Uncle     Social History:  Social History     Socioeconomic History    Marital status:    Tobacco Use    Smoking status: Former     Current packs/day: 0.00     Average packs/day: 1 pack/day for 17.0 years (17.0 ttl pk-yrs)     Types: Cigarettes     Start date: 1964     Quit date: 1981     Years since quittin.7    Smokeless tobacco: Never   Vaping Use    Vaping status: Never Used   Substance and Sexual Activity    Alcohol use: No     Alcohol/week: 0.0 standard drinks of alcohol    Drug use: No    Sexual activity: Yes     Partners: Female     Social Determinants of Health     Financial Resource Strain: Low Risk  (2024)    Received from [facility]    Financial Resource Strain     In the past year, have you or any family members you live with been unable to get any of the following when it was really needed? Check all that apply.: None   Food Insecurity: No Food Insecurity (2024)    Food Insecurity     Food Insecurity: Never true   Transportation Needs: No Transportation Needs (2024)    Transportation Needs     Lack of Transportation: No   Recent Concern: Transportation Needs - At Risk (2024)    Received from [facility]    Transportation Needs     In the past 12 months, has lack of reliable transportation kept you from medical appointments, meetings, work or from getting things needed for daily living? : Yes   Social Connections: Low  Risk  (9/11/2024)    Received from [facility]    Social Connections     How often do you see or talk to people that you care about and feel close to? (For example: talking to friends on the phone, visiting friends or family, going to [employer] or club meetings): 5 or more times a week   Housing Stability: Low Risk  (9/28/2024)    Housing Stability     Housing Instability: No     Allergies/Medications:   Allergies:   Allergies   Allergen Reactions    Adhesive Tape OTHER (SEE COMMENTS)     Severe rashes    Dust Mite Extract RASH     Medications Prior to Admission   Medication Sig    clopidogrel 75 MG Oral Tab Take 1 tablet (75 mg total) by mouth daily.    escitalopram 5 MG Oral Tab Take 1 tablet (5 mg total) by mouth daily.    linaGLIPtin (TRADJENTA) 5 mg Oral Tab Take 1 tablet (5 mg total) by mouth daily.    atorvastatin 40 MG Oral Tab Take 1 tablet (40 mg total) by mouth nightly.    felodipine ER 10 MG Oral Tablet 24 Hr Take 1 tablet (10 mg total) by mouth daily.    PANTOPRAZOLE 40 MG Oral Tab EC TAKE 1 TABLET BY MOUTH EVERY  MORNING BEFORE BREAKFAST    aspirin 81 MG Oral Tab EC Take 1 tablet (81 mg total) by mouth daily.    carvedilol 25 MG Oral Tab Take 1 tablet (25 mg total) by mouth 2 (two) times daily.    acetaminophen 500 MG Oral Tab Take 1 tablet (500 mg total) by mouth daily as needed for Pain.    Cholecalciferol 125 MCG (5000 UT) Oral Tab Take 1 tablet (5,000 Units total) by mouth 2 (two) times daily.    polyethylene glycol, PEG 3350, 17 g Oral Powd Pack daily as needed.    fluticasone propionate 50 MCG/ACT Nasal Suspension 2 sprays by Nasal route daily. (Patient taking differently: 2 sprays by Nasal route daily as needed.)    albuterol (PROAIR HFA) 108 (90 Base) MCG/ACT Inhalation Aero Soln Inhale 2 puffs into the lungs every 4 (four) hours as needed for Wheezing.    Budesonide-Formoterol Fumarate (SYMBICORT) 160-4.5 MCG/ACT Inhalation Aerosol Inhale 2 puffs into the lungs 2 (two) times daily.     cetirizine 10 MG Oral Tab Take 1 tablet (10 mg total) by mouth every other day.       Review of Systems:   Review of Systems    10 point review of system has been obtained and is otherwise negative    Physical Exam:   Vital Signs:  Blood pressure (!) 187/64, pulse 72, temperature 98.1 °F (36.7 °C), temperature source Temporal, resp. rate 26, weight 155 lb 11.2 oz (70.6 kg), SpO2 90%.  Physical Exam    General: Patient is alert and oriented x3 does not appear to be in acute distress at this time  HEENT: EOMI PERRLA, atraumatic normocephalic  Cardiac: S1-S2 appreciated  Lungs: Good air entry bilaterally clear to auscultation  Abdomen: Soft nontender nondistended positive bowel sounds  Ext: Peripheral pulses are positive  Neuro: No focal deficits noted  Psych: Normal mood  Skin: No rashes noted  MSK: Full range of motion intact      Results:     Lab Results   Component Value Date    WBC 9.1 09/28/2024    HGB 11.8 (L) 09/28/2024    HCT 35.5 (L) 09/28/2024    .0 09/28/2024    CREATSERUM 4.85 (H) 09/28/2024    BUN 59 (H) 09/28/2024     09/28/2024    K 3.9 09/28/2024     09/28/2024    CO2 31.0 09/28/2024     (H) 09/28/2024    CA 10.2 09/28/2024    ALB 3.9 09/28/2024    ALKPHO 75 09/28/2024    BILT 0.5 09/28/2024    TP 7.2 09/28/2024    AST 30 09/28/2024    ALT 17 09/28/2024    PTT 30.1 08/09/2024    INR 1.45 (H) 08/09/2024    T4F 0.9 08/31/2022    TSH 2.844 07/04/2024     (H) 07/30/2024    PSA 2.94 10/20/2021    ESRML 10 06/16/2024    CRP 1.30 (H) 06/16/2024    MG 1.7 08/01/2024    PHOS 5.0 08/01/2024    TROP <0.045 07/25/2019    TROPHS 25 04/25/2022     08/05/2023    B12 672 07/04/2024     XR CHEST AP PORTABLE  (CPT=71045)    Result Date: 9/28/2024  CONCLUSION:   Mild cardiomegaly with interstitial and alveolar pulmonary edema.  Other superimposed infiltrate not excluded.      Dictated by (CST): Apple Pierre MD on 9/28/2024 at 3:25 PM     Finalized by (CST): Apple Pierre MD  on 9/28/2024 at 3:26 PM          XR SHOULDER, COMPLETE (MIN 2 VIEWS), LEFT (CPT=73030)    Result Date: 9/28/2024  CONCLUSION:   No acute fracture or dislocation.  1.3 cm glenohumeral joint ossification, may represent a prominent osteophyte or a joint body.    Dictated by (CST): Apple Pierre MD on 9/28/2024 at 1:57 PM     Finalized by (CST): Apple Pierre MD on 9/28/2024 at 1:58 PM          XR HIP W OR WO PELVIS 2 OR 3 VIEWS, LEFT (CPT=73502)    Result Date: 9/28/2024  CONCLUSION:   Acute moderately impacted, mildly displaced fracture of the intertrochanteric left femur, as above.   Left hip joint space is maintained.      Dictated by (CST): Apple Pierre MD on 9/28/2024 at 1:56 PM     Finalized by (CST): Apple Pierre MD on 9/28/2024 at 1:57 PM          CT SPINE CERVICAL (CPT=72125)    Result Date: 9/28/2024  CONCLUSION:   No acute fracture or traumatic listhesis of the cervical spine.  Multiple additional chronic findings are not significantly changed when compared to 01/27/2023 and are described in detail above.    Dictated by (CST): Vernon Law MD on 9/28/2024 at 1:24 PM     Finalized by (CST): Vernon Law MD on 9/28/2024 at 1:30 PM          CT BRAIN OR HEAD (CPT=70450)    Result Date: 9/28/2024  CONCLUSION:   1. No acute intracranial hemorrhage, midline shift, mass effect, or hydrocephalus. 2. No transcortical area of hypoattenuation to suggest an acute infarct.  If there is clinical concern for an acute infarct, consider further evaluation with an MRI of the brain. 3. Moderate chronic microvascular ischemic changes with a small chronic left thalamus infarct. 4. No displaced calvarial fracture. 5. Lesser incidental findings described above.    Dictated by (CST): Vernon Law MD on 9/28/2024 at 1:17 PM     Finalized by (CST): Vernon Law MD on 9/28/2024 at 1:24 PM         EKG    Result Date: 9/28/2024  Sinus rhythm with occasional Premature ventricular complexes Possible Anterior  infarct , age undetermined Abnormal ECG When compared with ECG of 09-AUG-2024 14:45, Premature ventricular complexes are now Present Borderline criteria for Anterior infarct are now Present     Assessment/Plan:     Minimally displaced left femoral neck fracture  -Plan to obtain CT scan, today  -Appreciate orthopedic surgery recommendations  -Patient recently had a Watchman procedure done so pain he is currently not on Plavix  -Will currently hold aspirin as well  -Will continue to monitor patient closely  -Continue analgesics and antiemetics as needed    Accelerated hypertension  -Resume patient's oral antihypertensives  -Start the patient on IV hypertensives with SBP greater than 165  -Continue to monitor on telemetry  -Titrate meds as needed    Diabetes  -Start the patient on Accu-Cheks before every meal and at bedtime  -Hold oral hypoglycemic agents  -Supplement insulin sliding scale    ESRD on HD  -Nephrology placed on consult  -Dialysis per nephrology    History of CAD  -Continue home medications    HL  -Continue home meds    VTE ppx: heparin subcutaneous  Code Status : Patient is a full code    Global A/P  -Reviewed previous consultant notes  -Reviewed CBC, BMP, Mag, and Phos  -Reviewed tests ordered  -Repeat labs in am  -MDM: High, severe exacerbation of chronic illness posing a threat to life. IV medications requiring close inpatient monitoring.   -Greater than 75 minutes spent reviewing 50% Spent face-to-face    **Certification      PHYSICIAN Certification of Need for Inpatient Hospitalization - Initial Certification    Patient will require inpatient services that will reasonably be expected to span two midnight's based on the clinical documentation in H+P.   Based on patients current state of illness, I anticipate that, after discharge, patient will require TBD.        Darlin Phipps MD

## 2024-09-28 NOTE — ED INITIAL ASSESSMENT (HPI)
Pt via EMS from home c/o fall in bathroom. Pt states he hit his head, no LOC. Per pt, had watchman placed 2 weeks ago for afib. Pt states has \"balance problem\". Pt c/o head pain, left shoulder pain, left hip pain. Denies dizziness, denies headache.       Not able to complete dialysis yesterday.

## 2024-09-28 NOTE — ED QUICK NOTES
Orders for admission, patient is aware of plan and ready to go upstairs. Any questions, please call ED RN ely at extension [phone].     Patient Covid vaccination status: Fully vaccinated     COVID Test Ordered in ED: None    COVID Suspicion at Admission: N/A    Running Infusions:      Mental Status/LOC at time of transport: a/o x4    Other pertinent information:   CIWA score: N/A   NIH score:  N/A

## 2024-09-28 NOTE — CONSULTS
I spoke to Dr. Kline in the emergency room.  The patient has a minimally displaced left femoral neck fracture.  It is not an intertrochanteric fracture by the x-rays.  I ordered CT scan to see if this can be fixed or needs hemiarthroplasty.  Surgery is planned tomorrow.    Dr. Kline informed me that the patient is no longer on Plavix and that he recently had the Watchman procedure.  It is already 4 PM so I will use heparin subcu 5000 units once now since surgery is scheduled for 7:30 AM tomorrow and Sunday.  The exact procedure will be determined by the CT scan.

## 2024-09-29 ENCOUNTER — ANESTHESIA EVENT (OUTPATIENT)
Dept: MEDSURG UNIT | Facility: HOSPITAL | Age: 79
End: 2024-09-29
Payer: MEDICARE

## 2024-09-29 ENCOUNTER — APPOINTMENT (OUTPATIENT)
Dept: GENERAL RADIOLOGY | Facility: HOSPITAL | Age: 79
End: 2024-09-29
Attending: NURSE PRACTITIONER
Payer: MEDICARE

## 2024-09-29 ENCOUNTER — ANESTHESIA (OUTPATIENT)
Dept: MEDSURG UNIT | Facility: HOSPITAL | Age: 79
End: 2024-09-29
Payer: MEDICARE

## 2024-09-29 ENCOUNTER — APPOINTMENT (OUTPATIENT)
Dept: GENERAL RADIOLOGY | Facility: HOSPITAL | Age: 79
End: 2024-09-29
Attending: EMERGENCY MEDICINE
Payer: MEDICARE

## 2024-09-29 PROBLEM — S72.032G: Status: ACTIVE | Noted: 2024-09-28

## 2024-09-29 PROBLEM — M80.052G: Status: ACTIVE | Noted: 2024-01-01

## 2024-09-29 PROBLEM — S72.032G: Status: ACTIVE | Noted: 2024-01-01

## 2024-09-29 PROBLEM — M80.052G: Status: ACTIVE | Noted: 2024-09-29

## 2024-09-29 LAB
ALBUMIN SERPL-MCNC: 3.7 G/DL (ref 3.2–4.8)
ALBUMIN/GLOB SERPL: 1.1 {RATIO} (ref 1–2)
ALP LIVER SERPL-CCNC: 75 U/L
ALT SERPL-CCNC: 13 U/L
ANION GAP SERPL CALC-SCNC: 11 MMOL/L (ref 0–18)
ANION GAP SERPL CALC-SCNC: 9 MMOL/L (ref 0–18)
AST SERPL-CCNC: 23 U/L (ref ?–34)
ATRIAL RATE: 76 BPM
BASE EXCESS BLD CALC-SCNC: 6.5 MMOL/L (ref ?–2)
BASOPHILS # BLD AUTO: 0.07 X10(3) UL (ref 0–0.2)
BASOPHILS NFR BLD AUTO: 0.4 %
BILIRUB SERPL-MCNC: 0.7 MG/DL (ref 0.2–1.1)
BILIRUB UR QL: NEGATIVE
BNP SERPL-MCNC: 943 PG/ML
BUN BLD-MCNC: 59 MG/DL (ref 9–23)
BUN BLD-MCNC: 62 MG/DL (ref 9–23)
BUN/CREAT SERPL: 10.6 (ref 10–20)
BUN/CREAT SERPL: 11.5 (ref 10–20)
CALCIUM BLD-MCNC: 9.5 MG/DL (ref 8.7–10.4)
CALCIUM BLD-MCNC: 9.5 MG/DL (ref 8.7–10.4)
CHLORIDE SERPL-SCNC: 104 MMOL/L (ref 98–112)
CHLORIDE SERPL-SCNC: 105 MMOL/L (ref 98–112)
CHOLEST SERPL-MCNC: 179 MG/DL (ref ?–200)
CLARITY UR: CLEAR
CO2 SERPL-SCNC: 25 MMOL/L (ref 21–32)
CO2 SERPL-SCNC: 26 MMOL/L (ref 21–32)
CREAT BLD-MCNC: 5.38 MG/DL
CREAT BLD-MCNC: 5.55 MG/DL
D DIMER PPP FEU-MCNC: 6.07 UG/ML FEU (ref ?–0.77)
DEPRECATED RDW RBC AUTO: 57 FL (ref 35.1–46.3)
EGFRCR SERPLBLD CKD-EPI 2021: 10 ML/MIN/1.73M2 (ref 60–?)
EGFRCR SERPLBLD CKD-EPI 2021: 10 ML/MIN/1.73M2 (ref 60–?)
EOSINOPHIL # BLD AUTO: 0.22 X10(3) UL (ref 0–0.7)
EOSINOPHIL NFR BLD AUTO: 1.2 %
ERYTHROCYTE [DISTWIDTH] IN BLOOD BY AUTOMATED COUNT: 15.8 % (ref 11–15)
GLOBULIN PLAS-MCNC: 3.5 G/DL (ref 2–3.5)
GLUCOSE BLD-MCNC: 162 MG/DL (ref 70–99)
GLUCOSE BLD-MCNC: 176 MG/DL (ref 70–99)
GLUCOSE BLDC GLUCOMTR-MCNC: 126 MG/DL (ref 70–99)
GLUCOSE BLDC GLUCOMTR-MCNC: 156 MG/DL (ref 70–99)
GLUCOSE BLDC GLUCOMTR-MCNC: 165 MG/DL (ref 70–99)
GLUCOSE UR-MCNC: 500 MG/DL
HCO3 BLDA-SCNC: 29.9 MEQ/L (ref 21–27)
HCT VFR BLD AUTO: 41.9 %
HDLC SERPL-MCNC: 41 MG/DL (ref 40–59)
HGB BLD-MCNC: 13.1 G/DL
IMM GRANULOCYTES # BLD AUTO: 0.1 X10(3) UL (ref 0–1)
IMM GRANULOCYTES NFR BLD: 0.6 %
KETONES UR-MCNC: NEGATIVE MG/DL
LACTATE SERPL-SCNC: 1.6 MMOL/L (ref 0.5–2)
LDLC SERPL CALC-MCNC: 115 MG/DL (ref ?–100)
LEUKOCYTE ESTERASE UR QL STRIP.AUTO: NEGATIVE
LYMPHOCYTES # BLD AUTO: 0.54 X10(3) UL (ref 1–4)
LYMPHOCYTES NFR BLD AUTO: 3 %
MAGNESIUM SERPL-MCNC: 1.8 MG/DL (ref 1.6–2.6)
MCH RBC QN AUTO: 30.3 PG (ref 26–34)
MCHC RBC AUTO-ENTMCNC: 31.3 G/DL (ref 31–37)
MCV RBC AUTO: 96.8 FL
MONOCYTES # BLD AUTO: 0.59 X10(3) UL (ref 0.1–1)
MONOCYTES NFR BLD AUTO: 3.3 %
NEUTROPHILS # BLD AUTO: 16.42 X10 (3) UL (ref 1.5–7.7)
NEUTROPHILS # BLD AUTO: 16.42 X10(3) UL (ref 1.5–7.7)
NEUTROPHILS NFR BLD AUTO: 91.5 %
NITRITE UR QL STRIP.AUTO: NEGATIVE
NONHDLC SERPL-MCNC: 138 MG/DL (ref ?–130)
O2 CT BLD-SCNC: 15.6 VOL% (ref 15–23)
O2/TOTAL GAS SETTING VFR VENT: 40 %
OSMOLALITY SERPL CALC.SUM OF ELEC: 310 MOSM/KG (ref 275–295)
OSMOLALITY SERPL CALC.SUM OF ELEC: 312 MOSM/KG (ref 275–295)
P AXIS: 26 DEGREES
P-R INTERVAL: 148 MS
PCO2 BLDA: 48 MM HG (ref 35–45)
PEEP SETTING VENT: 5 CM H2O
PH BLDA: 7.43 [PH] (ref 7.35–7.45)
PH UR: 7.5 [PH] (ref 5–8)
PHOSPHATE SERPL-MCNC: 6.4 MG/DL (ref 2.4–5.1)
PLATELET # BLD AUTO: 188 10(3)UL (ref 150–450)
PO2 BLDA: 71 MM HG (ref 80–100)
POTASSIUM SERPL-SCNC: 4.3 MMOL/L (ref 3.5–5.1)
POTASSIUM SERPL-SCNC: 4.4 MMOL/L (ref 3.5–5.1)
PROT SERPL-MCNC: 7.2 G/DL (ref 5.7–8.2)
PROT UR-MCNC: 300 MG/DL
PUNCTURE CHARGE: YES
Q-T INTERVAL: 398 MS
QRS DURATION: 76 MS
QTC CALCULATION (BEZET): 447 MS
R AXIS: 11 DEGREES
RBC # BLD AUTO: 4.33 X10(6)UL
RESP RATE: 14 BPM
SAO2 % BLDA: 96.2 % (ref 94–100)
SODIUM SERPL-SCNC: 140 MMOL/L (ref 136–145)
SODIUM SERPL-SCNC: 140 MMOL/L (ref 136–145)
SP GR UR STRIP: 1.02 (ref 1–1.03)
SPECIMEN VOL 24H UR: 500 ML
T AXIS: 75 DEGREES
TRIGL SERPL-MCNC: 125 MG/DL (ref 30–149)
TRIGL SERPL-MCNC: 127 MG/DL (ref 30–149)
TROPONIN I SERPL HS-MCNC: 56 NG/L
UROBILINOGEN UR STRIP-ACNC: NORMAL
VENTRICULAR RATE: 76 BPM
VLDLC SERPL CALC-MCNC: 22 MG/DL (ref 0–30)
WBC # BLD AUTO: 17.9 X10(3) UL (ref 4–11)

## 2024-09-29 PROCEDURE — 71045 X-RAY EXAM CHEST 1 VIEW: CPT | Performed by: EMERGENCY MEDICINE

## 2024-09-29 PROCEDURE — 3078F DIAST BP <80 MM HG: CPT | Performed by: EMERGENCY MEDICINE

## 2024-09-29 PROCEDURE — 0BH17EZ INSERTION OF ENDOTRACHEAL AIRWAY INTO TRACHEA, VIA NATURAL OR ARTIFICIAL OPENING: ICD-10-PCS | Performed by: NURSE PRACTITIONER

## 2024-09-29 PROCEDURE — 5A1955Z RESPIRATORY VENTILATION, GREATER THAN 96 CONSECUTIVE HOURS: ICD-10-PCS | Performed by: NURSE PRACTITIONER

## 2024-09-29 PROCEDURE — 99232 SBSQ HOSP IP/OBS MODERATE 35: CPT | Performed by: EMERGENCY MEDICINE

## 2024-09-29 PROCEDURE — 71045 X-RAY EXAM CHEST 1 VIEW: CPT | Performed by: NURSE PRACTITIONER

## 2024-09-29 PROCEDURE — 1159F MED LIST DOCD IN RCRD: CPT | Performed by: EMERGENCY MEDICINE

## 2024-09-29 PROCEDURE — 3077F SYST BP >= 140 MM HG: CPT | Performed by: EMERGENCY MEDICINE

## 2024-09-29 PROCEDURE — 99233 SBSQ HOSP IP/OBS HIGH 50: CPT | Performed by: HOSPITALIST

## 2024-09-29 PROCEDURE — 99233 SBSQ HOSP IP/OBS HIGH 50: CPT | Performed by: INTERNAL MEDICINE

## 2024-09-29 PROCEDURE — 3008F BODY MASS INDEX DOCD: CPT | Performed by: EMERGENCY MEDICINE

## 2024-09-29 RX ORDER — POLYETHYLENE GLYCOL 3350 17 G/17G
17 POWDER, FOR SOLUTION ORAL DAILY PRN
Status: DISCONTINUED | OUTPATIENT
Start: 2024-09-29 | End: 2024-09-30

## 2024-09-29 RX ORDER — HYDRALAZINE HYDROCHLORIDE 20 MG/ML
INJECTION INTRAMUSCULAR; INTRAVENOUS
Status: COMPLETED
Start: 2024-09-29 | End: 2024-09-29

## 2024-09-29 RX ORDER — CHLORHEXIDINE GLUCONATE ORAL RINSE 1.2 MG/ML
15 SOLUTION DENTAL
Status: DISCONTINUED | OUTPATIENT
Start: 2024-09-29 | End: 2024-10-08

## 2024-09-29 RX ORDER — METOCLOPRAMIDE HYDROCHLORIDE 5 MG/ML
5 INJECTION INTRAMUSCULAR; INTRAVENOUS ONCE
Status: COMPLETED | OUTPATIENT
Start: 2024-09-29 | End: 2024-09-29

## 2024-09-29 RX ORDER — HEPARIN SODIUM 5000 [USP'U]/ML
5000 INJECTION, SOLUTION INTRAVENOUS; SUBCUTANEOUS EVERY 8 HOURS SCHEDULED
Status: DISCONTINUED | OUTPATIENT
Start: 2024-09-29 | End: 2024-09-30

## 2024-09-29 RX ORDER — FUROSEMIDE 10 MG/ML
INJECTION INTRAMUSCULAR; INTRAVENOUS
Status: DISPENSED
Start: 2024-09-29 | End: 2024-09-29

## 2024-09-29 RX ORDER — ACETAMINOPHEN 650 MG/1
650 SUPPOSITORY RECTAL EVERY 4 HOURS PRN
Status: DISCONTINUED | OUTPATIENT
Start: 2024-09-29 | End: 2024-09-30

## 2024-09-29 RX ORDER — HEPARIN SODIUM 1000 [USP'U]/ML
1.5 INJECTION, SOLUTION INTRAVENOUS; SUBCUTANEOUS
Status: DISCONTINUED | OUTPATIENT
Start: 2024-09-29 | End: 2024-10-15

## 2024-09-29 RX ORDER — ACETAMINOPHEN 10 MG/ML
1000 INJECTION, SOLUTION INTRAVENOUS EVERY 6 HOURS PRN
Status: DISCONTINUED | OUTPATIENT
Start: 2024-09-29 | End: 2024-09-30

## 2024-09-29 RX ORDER — HYDRALAZINE HYDROCHLORIDE 20 MG/ML
10 INJECTION INTRAMUSCULAR; INTRAVENOUS ONCE
Status: COMPLETED | OUTPATIENT
Start: 2024-09-29 | End: 2024-09-29

## 2024-09-29 RX ORDER — ACETAMINOPHEN 325 MG/1
650 TABLET ORAL EVERY 4 HOURS PRN
Status: DISCONTINUED | OUTPATIENT
Start: 2024-09-29 | End: 2024-09-30

## 2024-09-29 RX ORDER — ALBUMIN (HUMAN) 12.5 G/50ML
25 SOLUTION INTRAVENOUS
Status: DISCONTINUED | OUTPATIENT
Start: 2024-09-29 | End: 2024-09-30

## 2024-09-29 RX ORDER — SENNOSIDES 8.8 MG/5ML
10 LIQUID ORAL NIGHTLY PRN
Status: DISCONTINUED | OUTPATIENT
Start: 2024-09-29 | End: 2024-09-30

## 2024-09-29 RX ORDER — FUROSEMIDE 10 MG/ML
40 INJECTION INTRAMUSCULAR; INTRAVENOUS ONCE
Status: COMPLETED | OUTPATIENT
Start: 2024-09-29 | End: 2024-09-29

## 2024-09-29 RX ORDER — ONDANSETRON 2 MG/ML
INJECTION INTRAMUSCULAR; INTRAVENOUS
Status: COMPLETED
Start: 2024-09-29 | End: 2024-09-29

## 2024-09-29 RX ORDER — BISACODYL 10 MG
10 SUPPOSITORY, RECTAL RECTAL
Status: DISCONTINUED | OUTPATIENT
Start: 2024-09-29 | End: 2024-10-22

## 2024-09-29 RX ORDER — LIDOCAINE/PRILOCAINE 2.5 %-2.5%
CREAM (GRAM) TOPICAL AS NEEDED
Status: DISCONTINUED | OUTPATIENT
Start: 2024-09-29 | End: 2024-10-15

## 2024-09-29 RX ORDER — ACETAMINOPHEN 160 MG/5ML
650 SOLUTION ORAL EVERY 4 HOURS PRN
Status: DISCONTINUED | OUTPATIENT
Start: 2024-09-29 | End: 2024-09-30

## 2024-09-29 RX ORDER — ETOMIDATE 2 MG/ML
INJECTION INTRAVENOUS
Status: DISPENSED
Start: 2024-09-29 | End: 2024-09-29

## 2024-09-29 NOTE — CONSULTS
[facility]  part of [facility]    Report of Consultation    Ollie Hernández Patient Status:  Inpatient    1945 MRN [id number]   Location [facility] 2W/SW Attending Darlin Phipps MD   Hosp Day # 1 PCP Wili Parmar MD     Date of Admission:  2024  Date of Consult: 2024    Reason for Consultation:   Consults    History provided by: patient awake but in slight distress, spouse provided most of history.   HPI:     Chief Complaint   Patient presents with    Fall     HPI patient is a 79-year-old man admitted for left femoral neck fracture.  He was scheduled to have surgery with me this morning but possibly aspirated and transferred to the PCU.  When I saw him in the PCU, he was slightly diaphoretic with a BiPAP machine in place.  He became sick and was projectile vomiting.  His wife was present and she provided some history.    Patient fell yesterday 2024 while putting on his shoes.  He was admitted through the [facility] emergency room.  X-rays and CT scan show a minimally displaced left femoral neck fracture.  The femoral head is slightly in varus and slightly posterior.  Mild degenerative changes symmetric to the right hip but good joint space remaining.    The patient has diabetes and poor kidney function.  His GFR is 17.    History     Past Medical History:    Anemia    Asthma (HCC)    Back problem    BPH (benign prostatic hyperplasia)    Calculus of kidney    Cataract    Coronary atherosclerosis    Diabetes (HCC)    ESRD (end stage renal disease) on dialysis (HCC)    Essential hypertension    High blood pressure    High cholesterol    History of blood transfusion    Hyperlipidemia    Neuropathy    hands and feet    KRAIG on CPAP    Renal disorder    Sleep apnea    Visual impairment    glasses    Vocal cord paralysis, unilateral partial     Past Surgical History:   Procedure Laterality Date    Appendectomy          Back surgery      Neck/back -      Capsule endoscopy - internal referral      Cataract      2021 and 2022    Cath bare metal stent (bms)      Colonoscopy      Colonoscopy N/A 2021    Procedure: COLONOSCOPY;  Surgeon: Michael Gautam MD;  Location: [facility] ENDO    Colonoscopy N/A 2024    Procedure: COLONOSCOPY;  Surgeon: Gabriel Saldana MD;  Location: [facility] ENDOSCOPY    Colonoscopy N/A 06/15/2024    Procedure: COLONOSCOPY;  Surgeon: Carlyn Storey MD;  Location: [facility] ENDOSCOPY    Colonoscopy N/A 2024    Procedure: COLONOSCOPY;  Surgeon: Gabriel Saldana MD;  Location: [facility] ENDOSCOPY    Hand/finger surgery unlisted      Accidental trauma    Spine surgery procedure unlisted      Upper gi endoscopy,diagnosis       Family History   Problem Relation Age of Onset    Cancer Father         Kidney    Breast Cancer Mother     Diabetes Mother     Diabetes Maternal Grandmother     Diabetes Maternal Grandfather     Heart Disorder Other         Uncle     Social History:  Social History     Socioeconomic History    Marital status:    Tobacco Use    Smoking status: Former     Current packs/day: 0.00     Average packs/day: 1 pack/day for 17.0 years (17.0 ttl pk-yrs)     Types: Cigarettes     Start date: 1964     Quit date: 1981     Years since quittin.7    Smokeless tobacco: Never   Vaping Use    Vaping status: Never Used   Substance and Sexual Activity    Alcohol use: No     Alcohol/week: 0.0 standard drinks of alcohol    Drug use: No    Sexual activity: Yes     Partners: Female     Social Determinants of Health     Financial Resource Strain: Low Risk  (2024)    Received from [facility]    Financial Resource Strain     In the past year, have you or any family members you live with been unable to get any of the following when it was really needed? Check all that apply.: None   Food Insecurity: No Food Insecurity (2024)    Food Insecurity     Food Insecurity: Never true   Transportation Needs: No  Transportation Needs (9/28/2024)    Transportation Needs     Lack of Transportation: No   Recent Concern: Transportation Needs - At Risk (9/11/2024)    Received from [facility]    Transportation Needs     In the past 12 months, has lack of reliable transportation kept you from medical appointments, meetings, work or from getting things needed for daily living? : Yes   Social Connections: Low Risk  (9/11/2024)    Received from [facility]    Social Connections     How often do you see or talk to people that you care about and feel close to? (For example: talking to friends on the phone, visiting friends or family, going to [employer] or club meetings): 5 or more times a week   Housing Stability: Low Risk  (9/28/2024)    Housing Stability     Housing Instability: No     Allergies/Medications:   Allergies:   Allergies   Allergen Reactions    Adhesive Tape OTHER (SEE COMMENTS)     Severe rashes    Dust Mite Extract RASH     Medications Prior to Admission   Medication Sig    clopidogrel 75 MG Oral Tab Take 1 tablet (75 mg total) by mouth daily.    escitalopram 5 MG Oral Tab Take 1 tablet (5 mg total) by mouth daily.    linaGLIPtin (TRADJENTA) 5 mg Oral Tab Take 1 tablet (5 mg total) by mouth daily.    atorvastatin 40 MG Oral Tab Take 1 tablet (40 mg total) by mouth nightly.    felodipine ER 10 MG Oral Tablet 24 Hr Take 1 tablet (10 mg total) by mouth daily.    PANTOPRAZOLE 40 MG Oral Tab EC TAKE 1 TABLET BY MOUTH EVERY  MORNING BEFORE BREAKFAST    aspirin 81 MG Oral Tab EC Take 1 tablet (81 mg total) by mouth daily.    carvedilol 25 MG Oral Tab Take 1 tablet (25 mg total) by mouth 2 (two) times daily.    acetaminophen 500 MG Oral Tab Take 1 tablet (500 mg total) by mouth daily as needed for Pain.    Cholecalciferol 125 MCG (5000 UT) Oral Tab Take 1 tablet (5,000 Units total) by mouth 2 (two) times daily.    polyethylene glycol, PEG 3350, 17 g Oral Powd Pack daily as needed.    fluticasone propionate 50  MCG/ACT Nasal Suspension 2 sprays by Nasal route daily. (Patient taking differently: 2 sprays by Nasal route daily as needed.)    albuterol (PROAIR HFA) 108 (90 Base) MCG/ACT Inhalation Aero Soln Inhale 2 puffs into the lungs every 4 (four) hours as needed for Wheezing.    Budesonide-Formoterol Fumarate (SYMBICORT) 160-4.5 MCG/ACT Inhalation Aerosol Inhale 2 puffs into the lungs 2 (two) times daily.    cetirizine 10 MG Oral Tab Take 1 tablet (10 mg total) by mouth every other day.       Review of Systems:   Review of Systems  Exam limited due to the patient having acute distress when I was in the room.  Physical Exam:   Vital Signs:   weight is 155 lb 11.2 oz (70.6 kg). His temperature is 98.1 °F (36.7 °C). His blood pressure is 152/60 and his pulse is 96. His respiration is 25 and oxygen saturation is 96%.   Physical Exam exam unable to be performed as the patient was having acute distress.  Multiple nurses tending to him acutely.    Results:     Lab Results   Component Value Date    WBC 17.9 (H) 09/29/2024    HGB 13.1 09/29/2024    HCT 41.9 09/29/2024    .0 09/29/2024    CREATSERUM 5.38 (H) 09/29/2024    BUN 62 (H) 09/29/2024     09/29/2024    K 4.4 09/29/2024     09/29/2024    CO2 25.0 09/29/2024     (H) 09/29/2024    CA 9.5 09/29/2024    ALB 3.7 09/29/2024    ALKPHO 75 09/29/2024    BILT 0.7 09/29/2024    TP 7.2 09/29/2024    AST 23 09/29/2024    ALT 13 09/29/2024    PTT 30.1 08/09/2024    INR 1.45 (H) 08/09/2024    T4F 0.9 08/31/2022    TSH 2.844 07/04/2024     (H) 07/30/2024    PSA 2.94 10/20/2021    DDIMER 6.07 (H) 09/29/2024    ESRML 10 06/16/2024    CRP 1.30 (H) 06/16/2024    MG 1.8 09/29/2024    PHOS 6.4 (H) 09/29/2024    TROP <0.045 07/25/2019    TROPHS 56 (HH) 09/29/2024     08/05/2023    B12 672 07/04/2024     CT HIP(BONE) LEFT (CPT=73700)    Result Date: 9/28/2024  CONCLUSION:   1. Acute, mildly displaced, and mildly impacted fracture of the left femoral neck.  2.  No left hip dislocation.  The left femur is in a varus configuration. 3. Soft tissue swelling about the left hip with a small left hip joint effusion. 4. Prostatomegaly. 5. Circumferential bladder wall thickening, which may be secondary to chronic outlet obstruction or underdistention. 6. Lesser incidental findings described above.    Dictated by (CST): Vernon Law MD on 9/28/2024 at 9:22 PM     Finalized by (CST): Vernon Law MD on 9/28/2024 at 9:29 PM          XR CHEST AP PORTABLE  (CPT=71045)    Result Date: 9/28/2024  CONCLUSION:   Mild cardiomegaly with interstitial and alveolar pulmonary edema.  Other superimposed infiltrate not excluded.      Dictated by (CST): Apple Pierre MD on 9/28/2024 at 3:25 PM     Finalized by (CST): Apple Pierre MD on 9/28/2024 at 3:26 PM          XR SHOULDER, COMPLETE (MIN 2 VIEWS), LEFT (CPT=73030)    Result Date: 9/28/2024  CONCLUSION:   No acute fracture or dislocation.  1.3 cm glenohumeral joint ossification, may represent a prominent osteophyte or a joint body.    Dictated by (CST): Apple Pierre MD on 9/28/2024 at 1:57 PM     Finalized by (CST): Apple Pierre MD on 9/28/2024 at 1:58 PM          XR HIP W OR WO PELVIS 2 OR 3 VIEWS, LEFT (CPT=73502)    Result Date: 9/28/2024  CONCLUSION:   Acute moderately impacted, mildly displaced fracture of the intertrochanteric left femur, as above.   Left hip joint space is maintained.      Dictated by (CST): Apple Pierre MD on 9/28/2024 at 1:56 PM     Finalized by (CST): Apple Pierre MD on 9/28/2024 at 1:57 PM          CT SPINE CERVICAL (CPT=72125)    Result Date: 9/28/2024  CONCLUSION:   No acute fracture or traumatic listhesis of the cervical spine.  Multiple additional chronic findings are not significantly changed when compared to 01/27/2023 and are described in detail above.    Dictated by (CST): Vernon Law MD on 9/28/2024 at 1:24 PM     Finalized by (CST): Vernon Law MD on 9/28/2024 at 1:30 PM           CT BRAIN OR HEAD (CPT=70450)    Result Date: 9/28/2024  CONCLUSION:   1. No acute intracranial hemorrhage, midline shift, mass effect, or hydrocephalus. 2. No transcortical area of hypoattenuation to suggest an acute infarct.  If there is clinical concern for an acute infarct, consider further evaluation with an MRI of the brain. 3. Moderate chronic microvascular ischemic changes with a small chronic left thalamus infarct. 4. No displaced calvarial fracture. 5. Lesser incidental findings described above.    Dictated by (CST): Vernon Law MD on 9/28/2024 at 1:17 PM     Finalized by (CST): Vernon Law MD on 9/28/2024 at 1:24 PM         EKG    Result Date: 9/28/2024  Sinus rhythm with occasional Premature ventricular complexes Possible Anterior infarct , age undetermined Abnormal ECG When compared with ECG of 09-AUG-2024 14:45, Premature ventricular complexes are now Present Borderline criteria for Anterior infarct are now Present     Impression:     Closed displaced midcervical fracture of left femur with delayed healing  I discussed with the wife that at some point we will do surgery when his medical conditions are more stable.  For now his medical conditions take precedence.  Continue nonweightbearing left leg.      ESRD (end stage renal disease) (HCC)  Per medicine and renal      Fracture of left hip due to osteoporosis with delayed healing  May not be able to order calcium due to his poor kidney function.  Will order multivitamin when more medically stable.         Recommendations:  Will fix his left hip fracture once he is more medically stable.  At this point we are planning to do a hemiarthroplasty but if he is not very stable, we could try stabilization with screws which has a lower morbidity.  Will monitor his medical condition to schedule surgery appropriately.    Humberto Murphy MD  9/29/2024

## 2024-09-29 NOTE — HOME CARE LIAISON
Patient is current with [facility] RN and PT services

## 2024-09-29 NOTE — PLAN OF CARE
Ollie is NPO after MN for surgery this morning. Primofit in place for voiding but so far no urine output. Bladder scan showed 1cc of urine. Rt A/V fistula in place for dialysis. Using CPAP at night with O2.  SCD's in use for DVT prevention.  Problem: Patient Centered Care  Goal: Patient preferences are identified and integrated in the patient's plan of care  Description: Interventions:  - What would you like us to know as we care for you?   - Provide timely, complete, and accurate information to patient/family  - Incorporate patient and family knowledge, values, beliefs, and cultural backgrounds into the planning and delivery of care  - Encourage patient/family to participate in care and decision-making at the level they choose  - Honor patient and family perspectives and choices  Outcome: Progressing     Problem: Diabetes/Glucose Control  Goal: Glucose maintained within prescribed range  Description: INTERVENTIONS:  - Monitor Blood Glucose as ordered  - Assess for signs and symptoms of hyperglycemia and hypoglycemia  - Administer ordered medications to maintain glucose within target range  - Assess barriers to adequate nutritional intake and initiate nutrition consult as needed  - Instruct patient on self management of diabetes  Outcome: Progressing     Problem: Patient/Family Goals  Goal: Patient/Family Long Term Goal  Description: Patient's Long Term Goal:     Interventions:  -   - See additional Care Plan goals for specific interventions  Outcome: Progressing  Goal: Patient/Family Short Term Goal  Description: Patient's Short Term Goal:     Interventions:   -   - See additional Care Plan goals for specific interventions  Outcome: Progressing

## 2024-09-29 NOTE — SIGNIFICANT EVENT
Called to bedside after patient noted to have small emesis in BIPAP mask by nursing who readily removed BIPAP mask and placed patient in high fowlers position.  He is not in acute respiratory distress, no coughing noted.  Replaced CPAP with nasal canula.  Initially hypoxic at 88% but has seemingly recovered to 94-95%.    D/w Dr Smiley, no ABG needed.  On empiric abx  Updated Dr Phipps    Family at bedside, updated and all questions answered.      ELDA CARRILLO/CNP  Acute Care Nurse Practitioner  09/29/2024  1240       1400- HD completed, pt appears to be tiring out.  Decision made w/pulm critical care to intubate.  Anesthesia called for assistance.  Intubated without difficulty- see procedural note  OG placed, will order CXR.   ABG in a few hours.     Elda CARRILLO

## 2024-09-29 NOTE — RESPIRATORY THERAPY NOTE
Received patient on BIPAP,  RRT was called for this patient early this morning, BIPAP setting were changed by pulmonologist Baljit Lepe MD, now BIPAP setting are 16/10 FIO2 100%, R 20.

## 2024-09-29 NOTE — ANESTHESIA PROCEDURE NOTES
Airway  Date/Time: 9/29/2024 2:28 PM  Urgency: emergent    Airway not difficult    General Information and Staff    Patient location during procedure: ICU  Anesthesiologist: Atul Sheridan MD  Performed: anesthesiologist   Performed by: Atul Sheridan MD  Authorized by: Atul Sheridan MD      Consent for Airway (if performed for an anesthetic, see related documentation for consents)  Patient identity confirmed: arm band  Consent: Verbal consent obtained. Written consent obtained.  Risks and benefits: risks, benefits and alternatives were not discussed      Indications and Patient Condition  Indications for airway management: respiratory distress and respiratory failure  Spontaneous ventilation: present  Sedation level: deep  Preoxygenated: yes  Patient position: sniffing  MILS maintained throughout  Mask difficulty assessment: 1 - vent by mask    Final Airway Details  Final airway type: endotracheal airway      Successful airway: ETT  Cuffed: yes   Successful intubation technique: Video laryngoscopy  Facilitating devices/methods: intubating stylet and cricoid pressure  Endotracheal tube insertion site: oral  Blade: GlideScope  Blade size: #4  ETT size (mm): 8.0    Cormack-Lehane Classification: grade I - full view of glottis  Placement verified by: capnometry   Cuff volume (mL): 10  Measured from: teeth  ETT to teeth (cm): 24  Number of attempts at approach: 1  Number of other approaches attempted: 0

## 2024-09-29 NOTE — RESPIRATORY THERAPY NOTE
Patient's Problems:   CHF, Patient was on RRT this morning , admitted for mid cervical fracture on left femur with delayed healing. Now patient is intubated.    Respiratory Assessment :   Intubated patient is on ventilator/ full support.   Breath Sounds: were coarse and Rhonchi bilateral.   Secretions: Small  white and thick secretions.   Patient was suctioned as needed.       Ventilator Settings: AC/VC  RR 14 ,  , PEEP + 5, FIO2  40%      Airway: ETT 8.0  at 24 Lip    Day of intubation: 9/29/2024       Plan of Care:  Full code patient. , Patient was on RRT this morning, Admitted for close displaced mid cervical fracture on left femur with delayed healing. HX of CHF, Dialysis and ESRD. Now patient is intubated. Now patient is intubated after failed BIPAP. Patient was using accessory muscle, patient was SOB.   Patient needs to continue on ventilator. Patient's respiratory status is stable. Patient is tolerating ventilator setting  well. Patient was suction as need it, RT to continue to monitor this patient.

## 2024-09-29 NOTE — PROGRESS NOTES
On call Nocturnist 09/29/24  Rapid called for resp distress 66% on nc.  Tachypnea/hypertensive 201/66.  Was on cpap then had a significant emesis per nurse.  Currently he is working hard to breath, diminished bs bilateral with increased work of breathing.  ON NRB sats to 90%.  We quickly transferred to ICU placed on bipap, sats 95% work of breathing decreased.  We discussed intubation, patient reports would like to give bipap chance.  -suspect aspiration compounded by renal failure and markedly elevated bp  -iv hydralazine.  -stat labs/cxr  -consult pulmonary.      Xray showing sig pulm edema will get stat dialysis

## 2024-09-29 NOTE — PLAN OF CARE
Pt intubated @ 1428, pulmonology aware  Problem: Diabetes/Glucose Control  Goal: Glucose maintained within prescribed range  Description: INTERVENTIONS:  - Monitor Blood Glucose as ordered  - Assess for signs and symptoms of hyperglycemia and hypoglycemia  - Administer ordered medications to maintain glucose within target range  - Assess barriers to adequate nutritional intake and initiate nutrition consult as needed  - Instruct patient on self management of diabetes  Outcome: Progressing     Problem: PAIN - ADULT  Goal: Verbalizes/displays adequate comfort level or patient's stated pain goal  Description: INTERVENTIONS:  - Encourage pt to monitor pain and request assistance  - Assess pain using appropriate pain scale  - Administer analgesics based on type and severity of pain and evaluate response  - Implement non-pharmacological measures as appropriate and evaluate response  - Consider cultural and social influences on pain and pain management  - Manage/alleviate anxiety  - Utilize distraction and/or relaxation techniques  - Monitor for opioid side effects  - Notify MD/LIP if interventions unsuccessful or patient reports new pain  - Anticipate increased pain with activity and pre-medicate as appropriate  Outcome: Progressing     Problem: SAFETY ADULT - FALL  Goal: Free from fall injury  Description: INTERVENTIONS:  - Assess pt frequently for physical needs  - Identify cognitive and physical deficits and behaviors that affect risk of falls.  - [place] fall precautions as indicated by assessment.  - Educate pt/family on patient safety including physical limitations  - Instruct pt to call for assistance with activity based on assessment  - Modify environment to reduce risk of injury  - Provide assistive devices as appropriate  - Consider OT/PT consult to assist with strengthening/mobility  - Encourage toileting schedule  Outcome: Progressing     Problem: Safety Risk - Non-Violent Restraints  Goal: Patient will  remain free from self-harm  Description: INTERVENTIONS:  - Apply the least restrictive restraint to prevent harm  - Notify patient and family of reasons restraints applied  - Assess for any contributing factors to confusion (electrolyte disturbances, delirium, medications)  - Discontinue any unnecessary medical devices as soon as possible  - Assess the patient's physical comfort, circulation, skin condition, hydration, nutrition and elimination needs   - Reorient and redirection as needed  - Assess for the need to continue restraints  Outcome: Progressing     Problem: CARDIOVASCULAR - ADULT  Goal: Maintains optimal cardiac output and hemodynamic stability  Description: INTERVENTIONS:  - Monitor vital signs, rhythm, and trends  - Monitor for bleeding, hypotension and signs of decreased cardiac output  - Evaluate effectiveness of vasoactive medications to optimize hemodynamic stability  - Monitor arterial and/or venous puncture sites for bleeding and/or hematoma  - Assess quality of pulses, skin color and temperature  - Assess for signs of decreased coronary artery perfusion - ex. Angina  - Evaluate fluid balance, assess for edema, trend weights  Outcome: Progressing  Goal: Absence of cardiac arrhythmias or at baseline  Description: INTERVENTIONS:  - Continuous cardiac monitoring, monitor vital signs, obtain 12 lead EKG if indicated  - Evaluate effectiveness of antiarrhythmic and heart rate control medications as ordered  - Initiate emergency measures for life threatening arrhythmias  - Monitor electrolytes and administer replacement therapy as ordered  Outcome: Progressing

## 2024-09-29 NOTE — RESPIRATORY THERAPY NOTE
Patient to be placed on CPAP Yes; comment: Patient confirmed that he uses a CPAP at home and stated that he wants to use one during Hospital stay.  Patient refused No  CPAP Settings Auto Pap 5 min cm H2O. 20 max cm H2O. Mask Option:  Full face  Interface Full face

## 2024-09-29 NOTE — PLAN OF CARE
Arrived from home with spouse post fall to ER. Plan is surgery tomorrow morning, family informed. Alert and oriented. O2 per NC. Nebs ordered and given for wheezing on arrival to 418 (history KRAIG with CPAP use at home). Right AV fistula for Dialysis. Left SL. ACHS. Primo fit in use. New orders received. Dinner in progress.   Heparin dose x1 was given per orders.     1905- endorsed new orders and plan of care  to oncoming RN for IV and Urine test and SCDs and CT after change of shift.       Problem: Patient Centered Care  Goal: Patient preferences are identified and integrated in the patient's plan of care  Description: Interventions:  - What would you like us to know as we care for you? From home with spouse, had watchman procedure 2 weeks ago, fell today  - Provide timely, complete, and accurate information to patient/family  - Incorporate patient and family knowledge, values, beliefs, and cultural backgrounds into the planning and delivery of care  - Encourage patient/family to participate in care and decision-making at the level they choose  - Honor patient and family perspectives and choices  Outcome: Progressing     Problem: Diabetes/Glucose Control  Goal: Glucose maintained within prescribed range  Description: INTERVENTIONS:  - Monitor Blood Glucose as ordered  - Assess for signs and symptoms of hyperglycemia and hypoglycemia  - Administer ordered medications to maintain glucose within target range  - Assess barriers to adequate nutritional intake and initiate nutrition consult as needed  - Instruct patient on self management of diabetes  Outcome: Progressing     Problem: Patient/Family Goals  Goal: Patient/Family Long Term Goal  Description: Patient's Long Term Goal: recover and return home    Interventions:  - DC plan TBA  - See additional Care Plan goals for specific interventions  Outcome: Progressing  Goal: Patient/Family Short Term Goal  Description: Patient's Short Term Goal: Pain  <5/10    Interventions:   - Repositioned, Medicated for pain per MD orders  - See additional Care Plan goals for specific interventions  Outcome: Progressing

## 2024-09-29 NOTE — PROGRESS NOTES
[facility]  part of [facility]    Progress Note    Ollie Hernández Patient Status:  Inpatient    1945 MRN [id number]   Location [facility] 2W/SW Attending Darlin Phipps MD   Hosp Day # 1 PCP Wili Parmar MD     Chief Complaint:     Hip fracture left    Subjective:   Subjective:    Patient had a rapid response overnight for respiratory acute decompensation, and also was in hypertensive emergency.  Had significant emesis.  Patient transferred to ICU started on BiPAP.  Pulmonology on board possibly aspiration  Currently blood pressures improved  Respiratory status slightly stable    Objective:   Blood pressure 152/60, pulse 96, temperature 98.1 °F (36.7 °C), resp. rate 25, weight 155 lb 11.2 oz (70.6 kg), SpO2 96%.  Physical Exam    General: Patient is alert and oriented x3 does not appear to be in acute distress at this time  HEENT: EOMI PERRLA, atraumatic normocephalic  Cardiac: S1-S2 appreciated  Lungs: Good air entry bilaterally clear to auscultation  Abdomen: Soft nontender nondistended positive bowel sounds  Ext: Peripheral pulses are positive  Neuro: No focal deficits noted  Psych: Normal mood  Skin: No rashes noted  MSK: Full range of motion intact      Results:   Lab Results   Component Value Date    WBC 17.9 (H) 2024    HGB 13.1 2024    HCT 41.9 2024    .0 2024    CREATSERUM 5.38 (H) 2024    BUN 62 (H) 2024     2024    K 4.4 2024     2024    CO2 25.0 2024     (H) 2024    CA 9.5 2024    ALB 3.7 2024    ALKPHO 75 2024    BILT 0.7 2024    TP 7.2 2024    AST 23 2024    ALT 13 2024    PTT 30.1 2024    INR 1.45 (H) 2024    T4F 0.9 2022    TSH 2.844 2024     (H) 2024    PSA 2.94 10/20/2021    DDIMER 6.07 (H) 2024    ESRML 10 2024    CRP 1.30 (H) 2024    MG 1.8 2024    PHOS 6.4  (H) 09/29/2024    TROP <0.045 07/25/2019    TROPHS 56 (HH) 09/29/2024     08/05/2023    B12 672 07/04/2024       CT HIP(BONE) LEFT (CPT=73700)    Result Date: 9/28/2024  CONCLUSION:   1. Acute, mildly displaced, and mildly impacted fracture of the left femoral neck.  2. No left hip dislocation.  The left femur is in a varus configuration. 3. Soft tissue swelling about the left hip with a small left hip joint effusion. 4. Prostatomegaly. 5. Circumferential bladder wall thickening, which may be secondary to chronic outlet obstruction or underdistention. 6. Lesser incidental findings described above.    Dictated by (CST): Vernon Law MD on 9/28/2024 at 9:22 PM     Finalized by (CST): Vernon Law MD on 9/28/2024 at 9:29 PM          XR CHEST AP PORTABLE  (CPT=71045)    Result Date: 9/28/2024  CONCLUSION:   Mild cardiomegaly with interstitial and alveolar pulmonary edema.  Other superimposed infiltrate not excluded.      Dictated by (CST): Apple Pierre MD on 9/28/2024 at 3:25 PM     Finalized by (CST): Apple Pierre MD on 9/28/2024 at 3:26 PM          XR SHOULDER, COMPLETE (MIN 2 VIEWS), LEFT (CPT=73030)    Result Date: 9/28/2024  CONCLUSION:   No acute fracture or dislocation.  1.3 cm glenohumeral joint ossification, may represent a prominent osteophyte or a joint body.    Dictated by (CST): Apple Pierre MD on 9/28/2024 at 1:57 PM     Finalized by (CST): Apple Pierre MD on 9/28/2024 at 1:58 PM          XR HIP W OR WO PELVIS 2 OR 3 VIEWS, LEFT (CPT=73502)    Result Date: 9/28/2024  CONCLUSION:   Acute moderately impacted, mildly displaced fracture of the intertrochanteric left femur, as above.   Left hip joint space is maintained.      Dictated by (CST): Apple Pierre MD on 9/28/2024 at 1:56 PM     Finalized by (CST): Apple Pierre MD on 9/28/2024 at 1:57 PM          CT SPINE CERVICAL (CPT=72125)    Result Date: 9/28/2024  CONCLUSION:   No acute fracture or traumatic listhesis of the  cervical spine.  Multiple additional chronic findings are not significantly changed when compared to 01/27/2023 and are described in detail above.    Dictated by (CST): Vernon Law MD on 9/28/2024 at 1:24 PM     Finalized by (CST): Vernon Law MD on 9/28/2024 at 1:30 PM          CT BRAIN OR HEAD (CPT=70450)    Result Date: 9/28/2024  CONCLUSION:   1. No acute intracranial hemorrhage, midline shift, mass effect, or hydrocephalus. 2. No transcortical area of hypoattenuation to suggest an acute infarct.  If there is clinical concern for an acute infarct, consider further evaluation with an MRI of the brain. 3. Moderate chronic microvascular ischemic changes with a small chronic left thalamus infarct. 4. No displaced calvarial fracture. 5. Lesser incidental findings described above.    Dictated by (CST): Vernon Law MD on 9/28/2024 at 1:17 PM     Finalized by (CST): Vernon Lwa MD on 9/28/2024 at 1:24 PM         EKG    Result Date: 9/28/2024  Sinus rhythm with occasional Premature ventricular complexes Possible Anterior infarct , age undetermined Abnormal ECG When compared with ECG of 09-AUG-2024 14:45, Premature ventricular complexes are now Present Borderline criteria for Anterior infarct are now Present     Assessment & Plan:      Acute respiratory failure with hypoxia  -Possibly in the setting of aspiration  -Currently on BiPAP   -Appreciate recommendations from pulmonology  -Patient may need to get intubated  -Continue IV antibiotics  -Will monitor closely  -Transferred to ICU    Minimally displaced left femoral neck fracture  -Plan to obtain CT scan, today  -Appreciate orthopedic surgery recommendations  -Patient recently had a Watchman procedure done so pain he is currently not on Plavix  -Will currently hold aspirin as well  -Will continue to monitor patient closely  -Continue analgesics and antiemetics as needed     Accelerated hypertension  -Resume patient's oral antihypertensives  -Start the  patient on IV hypertensives with SBP greater than 165  -Continue to monitor on telemetry  -Titrate meds as needed     Diabetes  -Start the patient on Accu-Cheks before every meal and at bedtime  -Hold oral hypoglycemic agents  -Supplement insulin sliding scale     ESRD on HD  -Nephrology placed on consult  -Dialysis per nephrology     History of CAD  -Continue home medications     HL  -Continue home meds     VTE ppx: heparin subcutaneous  Code Status : Patient is a full code     Global A/P  -Transferred to ICU for acute respiratory decompensation started on IV antibiotics.   -Reviewed previous consultant notes  -Reviewed CBC, BMP, Mag, and Phos  -Reviewed tests ordered  -Repeat labs in am  -MDM: High, severe exacerbation of chronic illness posing a threat to life. IV medications requiring close inpatient monitoring.           Darlin Phipps MD  9/29/2024

## 2024-09-29 NOTE — SIGNIFICANT EVENT
At bedside, during second assessment of pt, I noticed pt beginning to heave with a small amount of emesis in BIPAP, immediately took off BIPAP and placed in high fowlers position. Once BIPAP was off pt projectile vomit onto himself and linen.X1 of Zofran and reglan given. Pt placed on HFNC 10L. APN and pulmonology notified immediately, see notes for more information at this time. Frequent monitoring of pt implemented.

## 2024-09-29 NOTE — CONSULTS
Critical Care H&P/Consult     NAME: Ollie Hernández - ROOM: 208/208-A - MRN: [id number] - Age: 79 year old - :  1945    Date of Admission: 2024 12:00 PM  Admission Diagnosis: Closed fracture of left hip, initial encounter ([facility]) [S72.002A]      Assessment/Plan:  Acute hypoxic respiratory failure - needing NIPPV for support  -CXR with findings consistent with diffuse infiltrates of flash pulm edema  -on BiPAP 16/10 - did better with higher PEEP  -needs HD /UF  -may need intubation  Flash pulm edema - very high BP after hip fracture.  Has ESRD on chronic dialysis  -needs emergent HD - being arranged  -doubt pneumonia - but on empiric Abx for now  ESRD -on HD - see above  -nephrology following  Hip fracture after fall  -will eventually need surgery when more stable medically  CAD / HTN - per cardiology  DM - per hospitalist  KRAIG - on chronic CPAP  FEN - npo for now while needing constant NIPPV  Dispo - full code  -sees [facility] as o/p for KRAIG    Discussed at length with patient / family.  Discussed with wife / dtrs and ICU team    Critical Care Time greater than: 45 Minutes    Roberth Smiley M.D.  Pulmonary and Critical Care Medicine  [facility]      History of Present Illness:   Ollie Hernández is a 79 year old with CAD, HTN, ESRD and KRAIG.  He unfortunately fell and broke his left hip.  He was being evaluated for surgery this morning when it was noted he had severe HTN and then difficulty breathing.  He has ESRD and is on chronic HD.  CXR showed diffuse infiltrates.  He was transferred to ICU in need of NIV    Past Medical History:    Anemia    Asthma ([facility])    Back problem    BPH (benign prostatic hyperplasia)    Calculus of kidney    Cataract    Coronary atherosclerosis    Diabetes (HCC)    ESRD (end stage renal disease) on dialysis ([facility])    Essential hypertension    High blood pressure    High cholesterol    History of blood transfusion    Hyperlipidemia    Neuropathy    hands and feet     KRAIG on CPAP    Renal disorder    Sleep apnea    Visual impairment    glasses    Vocal cord paralysis, unilateral partial     Past Surgical History:   Procedure Laterality Date    Appendectomy          Back surgery      Neck/back -     Capsule endoscopy - internal referral      Cataract      2021 and 2022    Cath bare metal stent (bms)      Colonoscopy      Colonoscopy N/A 2021    Procedure: COLONOSCOPY;  Surgeon: Michael Gautam MD;  Location: [facility] ENDO    Colonoscopy N/A 2024    Procedure: COLONOSCOPY;  Surgeon: Gabriel Saldana MD;  Location: [facility] ENDOSCOPY    Colonoscopy N/A 06/15/2024    Procedure: COLONOSCOPY;  Surgeon: Carlyn Storey MD;  Location: [facility] ENDOSCOPY    Colonoscopy N/A 2024    Procedure: COLONOSCOPY;  Surgeon: Gabriel Saldana MD;  Location: [facility] ENDOSCOPY    Hand/finger surgery unlisted      Accidental trauma    Spine surgery procedure unlisted      Upper gi endoscopy,diagnosis       Social History:  Social History     Tobacco Use    Smoking status: Former     Current packs/day: 0.00     Average packs/day: 1 pack/day for 17.0 years (17.0 ttl pk-yrs)     Types: Cigarettes     Start date: 1964     Quit date: 1981     Years since quittin.7    Smokeless tobacco: Never   Substance Use Topics    Alcohol use: No     Alcohol/week: 0.0 standard drinks of alcohol     Family History:  He indicated that the status of his other is unknown. He indicated that the status of his mother is unknown. He indicated that the status of his father is unknown. He indicated that the status of his maternal grandmother is unknown. He indicated that the status of his maternal grandfather is unknown.      Allergies:  Allergies   Allergen Reactions    Adhesive Tape OTHER (SEE COMMENTS)     Severe rashes    Dust Mite Extract RASH     No current outpatient medications on file.   insulin aspart (NovoLOG) 100 Units/mL FlexPen 1-7 Units  1-7 Units Subcutaneous q6h    [COMPLETED] furosemide  (Lasix) 10 mg/mL injection 40 mg  40 mg Intravenous Once    furosemide (Lasix) 10 mg/mL injection        [COMPLETED] hydrALAzine (Apresoline) 20 mg/mL injection 10 mg  10 mg Intravenous Once    etomidate (Amidate) 2 mg/mL injection        propofol (Diprivan) 10 MG/ML injection        piperacillin-tazobactam (Zosyn) 4.5 g in dextrose 5% 100 mL IVPB-ADDV  4.5 g Intravenous Once    heparin (Porcine) 1000 UNIT/ML injection 1,500 Units  1.5 mL Intravenous PRN Dialysis    lidocaine-prilocaine (Emla) 2.5-2.5 % cream   Topical PRN    sodium chloride 0.9 % IV bolus 100 mL  100 mL Intravenous Q30 Min PRN    And    albumin human (Albumin) 25% injection 25 g  25 g Intravenous PRN Dialysis    [COMPLETED] nitroGLYCERIN (Nitrobid) 2 % ointment 1 inch  1 inch Topical Once    [COMPLETED] hydrALAzine (Apresoline) 20 mg/mL injection 10 mg  10 mg Intravenous Once    [COMPLETED] HYDROcodone-acetaminophen (Norco) 5-325 MG per tab 1 tablet  1 tablet Oral Once    [COMPLETED] morphINE PF 4 MG/ML injection 4 mg  4 mg Intravenous Once    [COMPLETED] sodium chloride 0.9 % IV bolus 1,000 mL  1,000 mL Intravenous Once    [COMPLETED] ondansetron (Zofran) 4 MG/2ML injection 4 mg  4 mg Intravenous Once    ceFAZolin (Ancef) 2g in 10mL IV syringe premix  2 g Intravenous 30 Min Pre-Op    [COMPLETED] heparin (Porcine) 5000 UNIT/ML injection 5,000 Units  5,000 Units Subcutaneous Once    [COMPLETED] dilTIAZem (cardIZEM) 25 mg/5mL injection 20 mg  20 mg Intravenous Once    albuterol (Ventolin HFA) 108 (90 Base) MCG/ACT inhaler 2 puff  2 puff Inhalation Q4H PRN    acetaminophen (Tylenol Extra Strength) tab 500 mg  500 mg Oral Daily PRN    atorvastatin (Lipitor) tab 40 mg  40 mg Oral Nightly    carvedilol (Coreg) tab 25 mg  25 mg Oral BID    cetirizine (ZyrTEC) tab 5 mg  5 mg Oral Daily    escitalopram (Lexapro) tab 5 mg  5 mg Oral Daily    fluticasone propionate (Flonase) 50 MCG/ACT nasal suspension 2 spray  2 spray Nasal Daily PRN    pantoprazole  (Protonix) DR tab 40 mg  40 mg Oral Before breakfast    glucose (Dex4) 15 GM/59ML oral liquid 15 g  15 g Oral Q15 Min PRN    Or    glucose (Glutose) 40% oral gel 15 g  15 g Oral Q15 Min PRN    Or    glucose-vitamin C (Dex-4) chewable tab 4 tablet  4 tablet Oral Q15 Min PRN    Or    dextrose 50% injection 50 mL  50 mL Intravenous Q15 Min PRN    Or    glucose (Dex4) 15 GM/59ML oral liquid 30 g  30 g Oral Q15 Min PRN    Or    glucose (Glutose) 40% oral gel 30 g  30 g Oral Q15 Min PRN    Or    glucose-vitamin C (Dex-4) chewable tab 8 tablet  8 tablet Oral Q15 Min PRN    sodium chloride 0.9% infusion   Intravenous Continuous    morphINE PF 2 MG/ML injection 1 mg  1 mg Intravenous Q2H PRN    Or    morphINE PF 2 MG/ML injection 2 mg  2 mg Intravenous Q2H PRN    Or    morphINE PF 4 MG/ML injection 4 mg  4 mg Intravenous Q2H PRN    ipratropium-albuterol (Duoneb) 0.5-2.5 (3) MG/3ML inhalation solution 3 mL  3 mL Nebulization Q6H PRN    fluticasone-salmeterol (Advair Diskus) 250-50 MCG/ACT inhaler 1 puff  1 puff Inhalation BID      sodium chloride 100 mL/hr at 09/29/24 0538       Review of systems:   12 point review of systems performed and is negative aside from what is noted above    Objective:  Intake/Output Summary (Last 24 hours) at 9/29/2024 0879  Last data filed at 9/29/2024 0547  Gross per 24 hour   Intake --   Output 100 ml   Net -100 ml   FiO2 (%):  [100 %] 100 %  BP (!) 169/57   Pulse 95   Temp 98.1 °F (36.7 °C)   Resp 26   Wt 155 lb 11.2 oz (70.6 kg)   SpO2 98%   BMI 26.73 kg/m²   Physical Exam:   General: critical   HEENT: Normocephalic, without obvious abnormality, atraumatic. Moist oral mucosa   Neck: supple without mass   Lungs: coarse bilaterally   Chest wall: No tenderness or deformity.   Heart: Regular rate and rhythm, normal S1S2, no murmur.   Abdomen: soft, non-tender, non-distended, positive BS.   Extremity: No clubbing or cyanosis. no edema   Skin: No new rashes or lesions.   Neuro:  lethargic    Recent Labs   Lab 09/29/24  0438   RBC 4.33   HGB 13.1   HCT 41.9   MCV 96.8   MCH 30.3   MCHC 31.3   RDW 15.8*   NEPRELIM 16.42*   WBC 17.9*   .0     Recent Labs   Lab 09/28/24  1204 09/29/24  0438   * 162*   BUN 59* 59*   CREATSERUM 4.85* 5.55*   CA 10.2 9.5   ALB 3.9  --     140   K 3.9 4.3    105   CO2 31.0 26.0   ALKPHO 75  --    AST 30  --    ALT 17  --    BILT 0.5  --    TP 7.2  --      No results for input(s): \"ABGPHT\", \"CHSYAR4Z\", \"OKUZI3N\", \"ABGHCO3\", \"ABGBE\", \"TEMP\", \"SHERRI\", \"SITE\", \"DEV\", \"THGB\" in the last 168 hours.    Invalid input(s): \"LZO92OAK\", \"CHOB\"  Recent Labs   Lab 09/28/24  1204 09/29/24  0438   * 162*   BUN 59* 59*   CREATSERUM 4.85* 5.55*   CA 10.2 9.5    140   K 3.9 4.3    105   CO2 31.0 26.0     No results for input(s): \"TROP\", \"CK\" in the last 168 hours.    Imaging: I independently visualized all relevant chest imaging in PACS, agree with radiology interpretation except where noted.

## 2024-09-29 NOTE — PROGRESS NOTES
Pt arrived in PCCU 0615 on 100% BIPAP. Dr Moreau bedside. Pt SPO2>93% on BIPAP. Skin check completed. HR in 90s. BP 190s/90s      Labored breathing. Tachypneic. Following commands.   0624- Pt asked if he wanted to be on vent or stay on BIPAP. Asked to stay on BIPAP for now.

## 2024-09-29 NOTE — PROGRESS NOTES
RRT    *See RRT Documentation Record*    Reason the RRT was called: low O2 saturation, high B/P, and very labored breathing.  Assessment of patient leading up to RRT: He had a large emesis and B/P started rising and 02 saturation started to drop.  Interventions/Testing: Lasix 40 mg IV given  Patient Outcome/Disposition: Transferred to CCU room 208  Family Notified: yes  Name of family notified: Stephy was present at the time.

## 2024-09-29 NOTE — RESPIRATORY THERAPY NOTE
Arterial Line     Date/Time: 24 15:55 PM     Performed by: Landon Tyson RRT  Authorized by: Karla CARRILLO     General Information and Staff     Procedure Start: 15:30  Procedure End: 15:55  Performed By:  Respiratory  Patient Location:   Indication: Continuous blood pressure monitoring and blood sampling needed  Site Identification: Real time ultrasound guided and image stored and retrievable     Procedure Details     Catheter Size:  20 G  Catheter Length:  1 and 3/4 inch  Catheter Type:  Arrow  Site: Left radial artery  Site Prep: Chlorhexidine    Line Secured:  Tape, Tegaderm and Wrist Brace     Assessment     Events: Patient tolerated procedure well with no complications  Blood Pressure readin/52

## 2024-09-30 ENCOUNTER — APPOINTMENT (OUTPATIENT)
Dept: ULTRASOUND IMAGING | Facility: HOSPITAL | Age: 79
End: 2024-09-30
Attending: STUDENT IN AN ORGANIZED HEALTH CARE EDUCATION/TRAINING PROGRAM
Payer: MEDICARE

## 2024-09-30 ENCOUNTER — APPOINTMENT (OUTPATIENT)
Dept: CT IMAGING | Facility: HOSPITAL | Age: 79
End: 2024-09-30
Attending: INTERNAL MEDICINE
Payer: MEDICARE

## 2024-09-30 PROBLEM — S72.002A CLOSED FRACTURE OF LEFT HIP (HCC): Status: ACTIVE | Noted: 2024-01-01

## 2024-09-30 PROBLEM — S72.002A CLOSED FRACTURE OF LEFT HIP (HCC): Status: ACTIVE | Noted: 2024-09-29

## 2024-09-30 LAB
ANION GAP SERPL CALC-SCNC: 12 MMOL/L (ref 0–18)
BASOPHILS # BLD AUTO: 0.05 X10(3) UL (ref 0–0.2)
BASOPHILS NFR BLD AUTO: 0.4 %
BUN BLD-MCNC: 55 MG/DL (ref 9–23)
BUN/CREAT SERPL: 11.3 (ref 10–20)
CALCIUM BLD-MCNC: 9.9 MG/DL (ref 8.7–10.4)
CHLORIDE SERPL-SCNC: 98 MMOL/L (ref 98–112)
CO2 SERPL-SCNC: 29 MMOL/L (ref 21–32)
CREAT BLD-MCNC: 4.88 MG/DL
DEPRECATED RDW RBC AUTO: 54.8 FL (ref 35.1–46.3)
EGFRCR SERPLBLD CKD-EPI 2021: 12 ML/MIN/1.73M2 (ref 60–?)
EOSINOPHIL # BLD AUTO: 0.28 X10(3) UL (ref 0–0.7)
EOSINOPHIL NFR BLD AUTO: 2.5 %
ERYTHROCYTE [DISTWIDTH] IN BLOOD BY AUTOMATED COUNT: 16.1 % (ref 11–15)
GLUCOSE BLD-MCNC: 117 MG/DL (ref 70–99)
GLUCOSE BLDC GLUCOMTR-MCNC: 110 MG/DL (ref 70–99)
GLUCOSE BLDC GLUCOMTR-MCNC: 117 MG/DL (ref 70–99)
GLUCOSE BLDC GLUCOMTR-MCNC: 99 MG/DL (ref 70–99)
HCT VFR BLD AUTO: 28.5 %
HCT VFR BLD AUTO: 30.2 %
HGB BLD-MCNC: 10 G/DL
HGB BLD-MCNC: 9.9 G/DL
IMM GRANULOCYTES # BLD AUTO: 0.05 X10(3) UL (ref 0–1)
IMM GRANULOCYTES NFR BLD: 0.4 %
LYMPHOCYTES # BLD AUTO: 1 X10(3) UL (ref 1–4)
LYMPHOCYTES NFR BLD AUTO: 9 %
MAGNESIUM SERPL-MCNC: 1.8 MG/DL (ref 1.6–2.6)
MCH RBC QN AUTO: 30.9 PG (ref 26–34)
MCHC RBC AUTO-ENTMCNC: 33.1 G/DL (ref 31–37)
MCV RBC AUTO: 93.2 FL
MONOCYTES # BLD AUTO: 0.44 X10(3) UL (ref 0.1–1)
MONOCYTES NFR BLD AUTO: 3.9 %
NEUTROPHILS # BLD AUTO: 9.33 X10 (3) UL (ref 1.5–7.7)
NEUTROPHILS # BLD AUTO: 9.33 X10(3) UL (ref 1.5–7.7)
NEUTROPHILS NFR BLD AUTO: 83.8 %
OSMOLALITY SERPL CALC.SUM OF ELEC: 304 MOSM/KG (ref 275–295)
PHOSPHATE SERPL-MCNC: 5.6 MG/DL (ref 2.4–5.1)
PLATELET # BLD AUTO: 141 10(3)UL (ref 150–450)
POTASSIUM SERPL-SCNC: 3.5 MMOL/L (ref 3.5–5.1)
RBC # BLD AUTO: 3.24 X10(6)UL
SODIUM SERPL-SCNC: 139 MMOL/L (ref 136–145)
WBC # BLD AUTO: 11.2 X10(3) UL (ref 4–11)

## 2024-09-30 PROCEDURE — 71260 CT THORAX DX C+: CPT | Performed by: INTERNAL MEDICINE

## 2024-09-30 PROCEDURE — 99232 SBSQ HOSP IP/OBS MODERATE 35: CPT | Performed by: INTERNAL MEDICINE

## 2024-09-30 PROCEDURE — 5A1D70Z PERFORMANCE OF URINARY FILTRATION, INTERMITTENT, LESS THAN 6 HOURS PER DAY: ICD-10-PCS | Performed by: HOSPITALIST

## 2024-09-30 PROCEDURE — 99233 SBSQ HOSP IP/OBS HIGH 50: CPT | Performed by: INTERNAL MEDICINE

## 2024-09-30 RX ORDER — SENNOSIDES 8.8 MG/5ML
10 LIQUID ORAL NIGHTLY PRN
Status: DISCONTINUED | OUTPATIENT
Start: 2024-09-30 | End: 2024-10-02

## 2024-09-30 RX ORDER — CARVEDILOL 25 MG/1
25 TABLET ORAL 2 TIMES DAILY
Status: DISCONTINUED | OUTPATIENT
Start: 2024-09-30 | End: 2024-10-08

## 2024-09-30 RX ORDER — HYDRALAZINE HYDROCHLORIDE 20 MG/ML
10 INJECTION INTRAMUSCULAR; INTRAVENOUS EVERY 6 HOURS PRN
Status: DISCONTINUED | OUTPATIENT
Start: 2024-09-30 | End: 2024-10-22

## 2024-09-30 RX ORDER — ACETAMINOPHEN 650 MG/1
650 SUPPOSITORY RECTAL EVERY 4 HOURS PRN
Status: DISCONTINUED | OUTPATIENT
Start: 2024-09-30 | End: 2024-10-19

## 2024-09-30 RX ORDER — ACETAMINOPHEN 10 MG/ML
1000 INJECTION, SOLUTION INTRAVENOUS EVERY 6 HOURS PRN
Status: DISCONTINUED | OUTPATIENT
Start: 2024-09-30 | End: 2024-10-05

## 2024-09-30 RX ORDER — ALBUMIN (HUMAN) 12.5 G/50ML
25 SOLUTION INTRAVENOUS
Status: DISCONTINUED | OUTPATIENT
Start: 2024-09-30 | End: 2024-10-01

## 2024-09-30 RX ORDER — ATORVASTATIN CALCIUM 40 MG/1
40 TABLET, FILM COATED ORAL NIGHTLY
Status: DISCONTINUED | OUTPATIENT
Start: 2024-09-30 | End: 2024-10-08

## 2024-09-30 RX ORDER — ACETAMINOPHEN 325 MG/1
650 TABLET ORAL EVERY 4 HOURS PRN
Status: DISCONTINUED | OUTPATIENT
Start: 2024-09-30 | End: 2024-10-19

## 2024-09-30 RX ORDER — LABETALOL HYDROCHLORIDE 5 MG/ML
10 INJECTION, SOLUTION INTRAVENOUS EVERY 4 HOURS PRN
Status: DISCONTINUED | OUTPATIENT
Start: 2024-09-30 | End: 2024-10-22

## 2024-09-30 RX ORDER — ACETAMINOPHEN 160 MG/5ML
650 SOLUTION ORAL EVERY 4 HOURS PRN
Status: DISCONTINUED | OUTPATIENT
Start: 2024-09-30 | End: 2024-10-19

## 2024-09-30 RX ORDER — POLYETHYLENE GLYCOL 3350 17 G/17G
17 POWDER, FOR SOLUTION ORAL DAILY PRN
Status: DISCONTINUED | OUTPATIENT
Start: 2024-09-30 | End: 2024-10-22

## 2024-09-30 RX ORDER — CETIRIZINE HYDROCHLORIDE 5 MG/1
5 TABLET ORAL DAILY
Status: DISCONTINUED | OUTPATIENT
Start: 2024-10-01 | End: 2024-10-17

## 2024-09-30 RX ORDER — ACETAMINOPHEN 160 MG/5ML
500 SOLUTION ORAL DAILY PRN
Status: DISCONTINUED | OUTPATIENT
Start: 2024-09-30 | End: 2024-10-19 | Stop reason: ALTCHOICE

## 2024-09-30 NOTE — PROGRESS NOTES
[facility]  part of [facility]    Progress Note    Ollie Hernández Patient Status:  Inpatient    1945 MRN [id number]   Location [facility] 2W/SW Attending Darlin Phipps MD   Hosp Day # 1 PCP Wili Parmar MD       Subjective:   Ollie Hernández is a(n) 79 year old male     ROS:   Intubated and sedated  Vitals:    24 1600   BP: (!) 65/57   Pulse: 83   Resp: 14   Temp:            PHYSICAL EXAM:   Constitutional: appears sedated on ventilator  Head/Face: normocephalic  Eyes/Vision: normal extraocular motion is intact  Nose/Mouth/Throat:mucous membranes are moist and no oral lesions are noted  Neck/Thyroid: neck is supple without adenopathy  Lymphatic: no abnormal cervical, supraclavicular adenopathy is noted  Respiratory: Coarse breath sounds  Cardiovascular: regular rate and rhythm no murmurs, gallups, or rubs  Abdomen: soft, non-tender, non-distended, BS normal  Vascular: well perfused femoral, and pedal pulses normal  Skin/Hair: no unusual rashes present, no abnormal bruising noted  Back/Spine: no abnormalities noted  Musculoskeletal: full ROM all extremities good strength  no deformities  Extremities: no edema, cyanosis  Neurological intubated sedated  Results:     Laboratory Data:  Lab Results   Component Value Date    WBC 17.9 (H) 2024    HGB 13.1 2024    HCT 41.9 2024    .0 2024    CREATSERUM 5.38 (H) 2024    BUN 62 (H) 2024     2024    K 4.4 2024     2024    CO2 25.0 2024     (H) 2024    CA 9.5 2024    ALB 3.7 2024    ALKPHO 75 2024    BILT 0.7 2024    TP 7.2 2024    AST 23 2024    ALT 13 2024    PTT 30.1 2024    INR 1.45 (H) 2024    T4F 0.9 2022    TSH 2.844 2024     (H) 2024    PSA 2.94 10/20/2021    DDIMER 6.07 (H) 2024    ESRML 10 2024    CRP 1.30 (H) 2024    MG 1.8  09/29/2024    PHOS 6.4 (H) 09/29/2024    TROP <0.045 07/25/2019     08/05/2023    B12 672 07/04/2024       Imaging:  [unfilled]   XR CHEST AP PORTABLE  (CPT=71045)    Result Date: 9/29/2024  CONCLUSION:   Interval intubation with the endotracheal tube tip approximately 3.4 cm of the jono.  Enteric tube terminates in the proximal gastric body.  No other significant interval change.    Dictated by (CST): Kevin Hensley MD on 9/29/2024 at 3:47 PM     Finalized by (CST): Kevin Hensley MD on 9/29/2024 at 3:49 PM          CT HIP(BONE) LEFT (CPT=73700)    Result Date: 9/28/2024  CONCLUSION:   1. Acute, mildly displaced, and mildly impacted fracture of the left femoral neck.  2. No left hip dislocation.  The left femur is in a varus configuration. 3. Soft tissue swelling about the left hip with a small left hip joint effusion. 4. Prostatomegaly. 5. Circumferential bladder wall thickening, which may be secondary to chronic outlet obstruction or underdistention. 6. Lesser incidental findings described above.    Dictated by (CST): Vernon Law MD on 9/28/2024 at 9:22 PM     Finalized by (CST): Vernon Law MD on 9/28/2024 at 9:29 PM          XR CHEST AP PORTABLE  (CPT=71045)    Result Date: 9/28/2024  CONCLUSION:   Mild cardiomegaly with interstitial and alveolar pulmonary edema.  Other superimposed infiltrate not excluded.      Dictated by (CST): Apple Pierre MD on 9/28/2024 at 3:25 PM     Finalized by (CST): Apple Pierre MD on 9/28/2024 at 3:26 PM          XR SHOULDER, COMPLETE (MIN 2 VIEWS), LEFT (CPT=73030)    Result Date: 9/28/2024  CONCLUSION:   No acute fracture or dislocation.  1.3 cm glenohumeral joint ossification, may represent a prominent osteophyte or a joint body.    Dictated by (CST): Apple Pierre MD on 9/28/2024 at 1:57 PM     Finalized by (CST): Apple Pierre MD on 9/28/2024 at 1:58 PM          XR HIP W OR WO PELVIS 2 OR 3 VIEWS, LEFT (CPT=73502)    Result Date:  9/28/2024  CONCLUSION:   Acute moderately impacted, mildly displaced fracture of the intertrochanteric left femur, as above.   Left hip joint space is maintained.      Dictated by (CST): Apple Pierre MD on 9/28/2024 at 1:56 PM     Finalized by (CST): Apple Pierre MD on 9/28/2024 at 1:57 PM          CT SPINE CERVICAL (CPT=72125)    Result Date: 9/28/2024  CONCLUSION:   No acute fracture or traumatic listhesis of the cervical spine.  Multiple additional chronic findings are not significantly changed when compared to 01/27/2023 and are described in detail above.    Dictated by (CST): Vernon Law MD on 9/28/2024 at 1:24 PM     Finalized by (CST): Vernon Law MD on 9/28/2024 at 1:30 PM          CT BRAIN OR HEAD (CPT=70450)    Result Date: 9/28/2024  CONCLUSION:   1. No acute intracranial hemorrhage, midline shift, mass effect, or hydrocephalus. 2. No transcortical area of hypoattenuation to suggest an acute infarct.  If there is clinical concern for an acute infarct, consider further evaluation with an MRI of the brain. 3. Moderate chronic microvascular ischemic changes with a small chronic left thalamus infarct. 4. No displaced calvarial fracture. 5. Lesser incidental findings described above.    Dictated by (CST): Vernon Law MD on 9/28/2024 at 1:17 PM     Finalized by (CST): Vernon Law MD on 9/28/2024 at 1:24 PM             ASSESSMENT/PLAN:   Assessment       #1 rapid response partial respiratory arrest most likely aspirated now has pneumonia is in ICU on a ventilator did emergency dialysis remove 3-1/2 L we will evaluate on daily basis  #2 right hip fracture  Surgery put on hold     #3 history of GI bleeding hemoglobin stable  #4 hypertension stable  9/29/2024  José Callahan MD

## 2024-09-30 NOTE — PROGRESS NOTES
Critical Care Progress Note     Assessment / Plan:  Acute respiratory failure - due to pulmonary edema and/or aspiration. Intubated 9/29  - continue full vent support  - daily SBT  - minimize sedation  - empiric Zosyn; follow-up cultures  - volume management with HD  - CTA chest given elevated d-dimer  ESRD   - HD per nephrology  KRAIG  - on CPAP at home  Asthma  - Advair in lieu of Symbicort once extubated  Coffee ground output from OGT  - IV Protonix BID  Anemia - due to above  - transfuse to keep hgb >7  Hip fracture after fall  - per ortho  DM  - SSI  FEN  - NPO given concern for possible GIB  Ppx  - hold subcu heparin for now  - PPI  Dispo  - ICU    Discussed with family at bedside    Critical care time: 75 minutes      Subjective:  Febrile this morning. OGT with small amount of coffee ground emesis.     Objective:  Vitals:    09/30/24 0600 09/30/24 0700 09/30/24 0730 09/30/24 0813   BP:       BP Location:       Pulse: 77 75 82 76   Resp: 22 15 19 11   Temp:   (!) 101.4 °F (38.6 °C) 99.7 °F (37.6 °C)   TempSrc:   Temporal Esophageal   SpO2: 100% 100% 100% 100%   Weight:         Physical Exam:  General: intubated  Skin: no rash, ulcers or subcutaneous nodules  Eyes: anicteric sclerae, moist conjunctivae  Head, ears, nose, throat: atraumatic, oropharynx clear with moist mucous membranes  Neck: trachea midline with no thyromegaly  Heart: regular rate and rhythm, no murmurs / rubs / gallops  Lungs: clear bilaterally  Abdomen: soft, nontender, nondistended   Extremities: no edema or cyanosis  Psych: opens eyes to voice    Medications:  Reviewed in EMR    Lab Data:  Reviewed in EMR    Imaging:  I independently visualized all relevant chest imaging in PACS and agree with radiology interpretation except where noted.

## 2024-09-30 NOTE — PROGRESS NOTES
[facility]  part of [facility]    Progress Note    Ollie Hernández Patient Status:  Inpatient    1945 MRN [id number]   Location [facility] 2W/SW Attending Darlin Phipps MD   Hosp Day # 2 PCP Wili Parmar MD     Chief Complaint:     Hip fracture left    Subjective:   Resting comfortably in bed. NAD.   Remains intubated at this time.     Objective:   Blood pressure (!) 65/57, pulse 83, temperature 99.5 °F (37.5 °C), temperature source Esophageal, resp. rate 15, weight 164 lb 0.4 oz (74.4 kg), SpO2 100%.      General: Patient is alert and oriented, tracking with eyes  Cardiac: S1-S2 appreciated  Lungs: mechanical breath sounds  Abdomen: Soft nontender nondistended positive bowel sounds  Ext: Peripheral pulses are positive  Neuro: No focal deficits noted  Psych: Normal mood  Skin: No rashes noted  MSK: Full range of motion intact      Results:   Lab Results   Component Value Date    WBC 11.2 (H) 2024    HGB 10.0 (L) 2024    HCT 30.2 (L) 2024    .0 (L) 2024    CREATSERUM 4.88 (H) 2024    BUN 55 (H) 2024     2024    K 3.5 2024    CL 98 2024    CO2 29.0 2024     (H) 2024    CA 9.9 2024    ALB 3.7 2024    ALKPHO 75 2024    BILT 0.7 2024    TP 7.2 2024    AST 23 2024    ALT 13 2024    PTT 30.1 2024    INR 1.45 (H) 2024    T4F 0.9 2022    TSH 2.844 2024     (H) 2024    PSA 2.94 10/20/2021    DDIMER 6.07 (H) 2024    ESRML 10 2024    CRP 1.30 (H) 2024    MG 1.8 2024    PHOS 5.6 (H) 2024    TROP <0.045 2019    TROPHS 56 (HH) 2024     2023    B12 672 2024       CT CHEST PE AORTA (IV ONLY) (CPT=71260)    Result Date: 2024  CONCLUSION:  1. No evidence of acute pulmonary embolism to the level of the proximal segmental pulmonary artery branches.  2. Bilateral  pleural effusions and associated basilar atelectasis, with or without superimposed pneumonia.  3. Mild pulmonary interstitial edema is observed.  4. Dilatation of the main pulmonary artery trunk may relate to underlying pulmonary hypertension.  5. Mild ascending thoracic aortic ectasia.  6. Mild centrilobular emphysema.  7. Additional support tubes and lines as above.  8. A 1.9 cm right thyroid lobe nodule is present. If not previously worked up, follow-up nonemergent thyroid sonography can be considered for further assessment.  9. Lesser incidental findings as above.      Dictated by (CST): Yung Rios MD on 9/30/2024 at 11:44 AM     Finalized by (CST): Yung Rios MD on 9/30/2024 at 11:54 AM          XR CHEST AP PORTABLE  (CPT=71045)    Result Date: 9/29/2024  CONCLUSION:   Interval intubation with the endotracheal tube tip approximately 3.4 cm of the jono.  Enteric tube terminates in the proximal gastric body.  No other significant interval change.    Dictated by (CST): Kevin Hensley MD on 9/29/2024 at 3:47 PM     Finalized by (CST): Kevin Hensley MD on 9/29/2024 at 3:49 PM          CT HIP(BONE) LEFT (CPT=73700)    Result Date: 9/28/2024  CONCLUSION:   1. Acute, mildly displaced, and mildly impacted fracture of the left femoral neck.  2. No left hip dislocation.  The left femur is in a varus configuration. 3. Soft tissue swelling about the left hip with a small left hip joint effusion. 4. Prostatomegaly. 5. Circumferential bladder wall thickening, which may be secondary to chronic outlet obstruction or underdistention. 6. Lesser incidental findings described above.    Dictated by (CST): Vernon Law MD on 9/28/2024 at 9:22 PM     Finalized by (CST): Vernon Law MD on 9/28/2024 at 9:29 PM          XR CHEST AP PORTABLE  (CPT=71045)    Result Date: 9/28/2024  CONCLUSION:   Mild cardiomegaly with interstitial and alveolar pulmonary edema.  Other superimposed infiltrate not excluded.       Dictated by (CST): Apple Pierre MD on 9/28/2024 at 3:25 PM     Finalized by (CST): Apple Pierre MD on 9/28/2024 at 3:26 PM          XR SHOULDER, COMPLETE (MIN 2 VIEWS), LEFT (CPT=73030)    Result Date: 9/28/2024  CONCLUSION:   No acute fracture or dislocation.  1.3 cm glenohumeral joint ossification, may represent a prominent osteophyte or a joint body.    Dictated by (CST): Apple Pierre MD on 9/28/2024 at 1:57 PM     Finalized by (CST): Apple Pierre MD on 9/28/2024 at 1:58 PM          XR HIP W OR WO PELVIS 2 OR 3 VIEWS, LEFT (CPT=73502)    Result Date: 9/28/2024  CONCLUSION:   Acute moderately impacted, mildly displaced fracture of the intertrochanteric left femur, as above.   Left hip joint space is maintained.      Dictated by (CST): Apple Pierre MD on 9/28/2024 at 1:56 PM     Finalized by (CST): Apple Pierre MD on 9/28/2024 at 1:57 PM          CT SPINE CERVICAL (CPT=72125)    Result Date: 9/28/2024  CONCLUSION:   No acute fracture or traumatic listhesis of the cervical spine.  Multiple additional chronic findings are not significantly changed when compared to 01/27/2023 and are described in detail above.    Dictated by (CST): Vernon Law MD on 9/28/2024 at 1:24 PM     Finalized by (CST): Vernon Law MD on 9/28/2024 at 1:30 PM          CT BRAIN OR HEAD (CPT=70450)    Result Date: 9/28/2024  CONCLUSION:   1. No acute intracranial hemorrhage, midline shift, mass effect, or hydrocephalus. 2. No transcortical area of hypoattenuation to suggest an acute infarct.  If there is clinical concern for an acute infarct, consider further evaluation with an MRI of the brain. 3. Moderate chronic microvascular ischemic changes with a small chronic left thalamus infarct. 4. No displaced calvarial fracture. 5. Lesser incidental findings described above.    Dictated by (CST): Vernon Law MD on 9/28/2024 at 1:17 PM     Finalized by (CST): Vernon Law MD on 9/28/2024 at 1:24 PM                Assessment & Plan:      Acute respiratory failure with hypoxia - Possibly in the setting of aspiration  -Patient intubated during hospital stay  -CTA chest reviewed  -Pulm on consult   -continue IV abx, vent support, volume management with HD  -monitor vitals    Minimally displaced left femoral neck fracture  -Appreciate orthopedic surgery recommendations - surgical intervention once medically stable  -Patient recently had a Watchman procedure done so pain he is currently not on Plavix  -Will currently hold aspirin as well  -Will continue to monitor patient closely  -Continue analgesics and antiemetics as needed     Accelerated hypertension  Recent Watchman procedure  -Resume patient's oral antihypertensives  -Start the patient on IV hypertensives with SBP greater than 165  -Continue to monitor on telemetry  -Titrate meds as needed  -Hydralazine/Labetalol PRN  -appreciate cardiology recs     Diabetes  -Start the patient on Accu-Cheks before every meal and at bedtime  -Hold oral hypoglycemic agents  -Supplement insulin sliding scale     ESRD on HD  -Nephrology placed on consult  -Dialysis per nephrology  -HD post CTA chest pending     History of CAD  -Continue home medications     HL  -Continue home meds     VTE ppx: heparin subcutaneous  Code Status : Patient is a full code     Global A/P  -Reviewed previous consultant notes  -Reviewed CBC, BMP, Mag, and Phos  -Reviewed tests ordered  -Repeat labs in am  -MDM: High, severe exacerbation of chronic illness posing a threat to life. IV medications requiring close inpatient monitoring.

## 2024-09-30 NOTE — RESPIRATORY THERAPY NOTE
Pt received on vent with settings of AC/VC 14/500/+5/40%. Bilateral BS auscultated. Suction was proved as needed. ETT was rotated during shift. No vent changes were made during night. RT to continue monitor.

## 2024-09-30 NOTE — CDS QUERY
.How to answer this Query:     1.) DON'T CLICK COSIGN BUTTON FIRST  2.) Click \"3 dots...\" to the right of cosign button and click EDIT on the toolbar.  2.) Type an \"X\" in the bracket for the diagnosis that applies. (You may also add additional clinical details as you feel necessary to substantiate your response).   3.) Finally click \"Sign\" to complete response.  Thank You        CLINICAL DOCUMENTATION CLARIFICATION     Dear Doctor Harvey,   Clinical information (provided below) includes a diagnosis of Heart Failure. Please specify type and acuity  .  PLEASE (X) DIAGNOSIS THAT APPLIES.          ( x ) Acute on Chronic Diastolic Heart Failure    (    ) Other - please specify: _______________________________        Clinical Indicators: Echo 5/10/19 - ejection fraction was 60-65%, BNP on 9/29/24 - 943, CXR 9/28/24 - Mild cardiomegaly with interstitial and alveolar pulmonary edema. CT Chest 9/30/24 - Mild pulmonary interstitial edema is observed.     Nephrology Consult 9/28 - includes chronic diastolic congestive heart failure in history  Pulmonology Consult 9/29 - Flash Pulmonary Edema     Risk Factor: 80 y/o pmh of ESRD, HTN, CAD     Treatment: IV lasix x 1, home Coreg         If you have any questions, please contact Clinical :  William AWAD at [phone]     Thank You!     THIS FORM IS A PERMANENT PART OF THE MEDICAL RECORD

## 2024-09-30 NOTE — PROGRESS NOTES
[facility]  part of [facility]    Progress Note    Ollie Hernández Patient Status:  Inpatient    1945 MRN [id number]   Location [facility] 2W/SW Attending Radha Gabriel MD   Hosp Day # 2 PCP Wili Parmar MD       Subjective:   Subjective:  Currently intubated and sedated.  Daughter at bedside.  Patient is currently getting dialysis. Had coffee ground emesis earlier.     Objective:   Blood pressure (!) 65/57, pulse 78, temperature 99.7 °F (37.6 °C), resp. rate 14, weight 164 lb 0.4 oz (74.4 kg), SpO2 100%.  Physical Exam  Unable to assess left hip, due to patient being intubated.  Lower extremity skin healthy, no pitting edema.    Results:   Lab Results   Component Value Date    WBC 11.2 (H) 2024    HGB 9.9 (L) 2024    HCT 28.5 (L) 2024    .0 (L) 2024    CREATSERUM 4.88 (H) 2024    BUN 55 (H) 2024     2024    K 3.5 2024    CL 98 2024    CO2 29.0 2024     (H) 2024    CA 9.9 2024    ALB 3.7 2024    ALKPHO 75 2024    BILT 0.7 2024    TP 7.2 2024    AST 23 2024    ALT 13 2024    PTT 30.1 2024    INR 1.45 (H) 2024    T4F 0.9 2022    TSH 2.844 2024     (H) 2024    PSA 2.94 10/20/2021    DDIMER 6.07 (H) 2024    ESRML 10 2024    CRP 1.30 (H) 2024    MG 1.8 2024    PHOS 5.6 (H) 2024    TROP <0.045 2019    TROPHS 56 (HH) 2024     2023    B12 672 2024       CT CHEST PE AORTA (IV ONLY) (CPT=71260)    Result Date: 2024  CONCLUSION:  1. No evidence of acute pulmonary embolism to the level of the proximal segmental pulmonary artery branches.  2. Bilateral pleural effusions and associated basilar atelectasis, with or without superimposed pneumonia.  3. Mild pulmonary interstitial edema is observed.  4. Dilatation of the main pulmonary artery trunk may relate to  underlying pulmonary hypertension.  5. Mild ascending thoracic aortic ectasia.  6. Mild centrilobular emphysema.  7. Additional support tubes and lines as above.  8. A 1.9 cm right thyroid lobe nodule is present. If not previously worked up, follow-up nonemergent thyroid sonography can be considered for further assessment.  9. Lesser incidental findings as above.      Dictated by (CST): Yung Rios MD on 9/30/2024 at 11:44 AM     Finalized by (CST): Yung Rios MD on 9/30/2024 at 11:54 AM          XR CHEST AP PORTABLE  (CPT=71045)    Result Date: 9/29/2024  CONCLUSION:   Interval intubation with the endotracheal tube tip approximately 3.4 cm of the jono.  Enteric tube terminates in the proximal gastric body.  No other significant interval change.    Dictated by (CST): Kevin Hensley MD on 9/29/2024 at 3:47 PM     Finalized by (CST): Kevin Hensley MD on 9/29/2024 at 3:49 PM          CT HIP(BONE) LEFT (CPT=73700)    Result Date: 9/28/2024  CONCLUSION:   1. Acute, mildly displaced, and mildly impacted fracture of the left femoral neck.  2. No left hip dislocation.  The left femur is in a varus configuration. 3. Soft tissue swelling about the left hip with a small left hip joint effusion. 4. Prostatomegaly. 5. Circumferential bladder wall thickening, which may be secondary to chronic outlet obstruction or underdistention. 6. Lesser incidental findings described above.    Dictated by (CST): Vernon Law MD on 9/28/2024 at 9:22 PM     Finalized by (CST): Vernon Law MD on 9/28/2024 at 9:29 PM               Assessment & Plan:     Closed displaced midcervical fracture of left femur with delayed healing  I discussed with the daughter that the plan remains to do surgery on his medical missions are more stable.  For now his medical conditions take precedence.  He will continue to be nonweightbearing on his left leg.      ESRD (end stage renal disease) (HCC)  Per medicine and renal      Closed fracture  of left hip (HCC)  As above              SOFÍA Delgado  9/30/2024

## 2024-09-30 NOTE — CM/SW NOTE
09/30/24 1400   CM/SW Referral Data   Referral Source    Reason for Referral Discharge planning   Informant Daughter   Medical Hx   Does patient have an established PCP? Yes   Significant Past Medical/Mental Health Hx ESRD   Patient Info   Patient's Current Mental Status at Time of Assessment   (ON vent support)   Patient's Home Environment House   Number of Levels in Home 2   [place] of Stair in Home [place] with basement   Patient lives with Spouse/Significant other   Patient Status Prior to Admission   Independent with ADLs and Mobility No  (used cane and walker)   Services in place prior to admission Dialysis   Dialysis Clinic [facility]  ([place])   Scheduled Dialysis days M-W-F   Discharge Needs   Anticipated D/C needs To be determined     No home O2.    Patient is currently intubated and on vent support.  CM/SW will follow and assist with dc planning needs once extubated.    Matilde Stuart MBA BSN RN CRRN   RN Case Manager  [phone]

## 2024-09-30 NOTE — PROGRESS NOTES
[facility]  part of formerly [facility]    Progress Note    Ollie Hernández Patient Status:  Inpatient    1945 MRN [id number]   Location [facility] 2W/SW Attending Radha Gabriel MD   Hosp Day # 2 PCP Wili Parmar MD       Subjective:   Ollie Hernández is a(n) 79 year old male who I am seeing for ESRD    Intubated  Going for CTA      Objective:   BP (!) 65/57   Pulse 76   Temp 99.7 °F (37.6 °C) (Esophageal)   Resp 11   Wt 164 lb 0.4 oz (74.4 kg)   SpO2 100%   BMI 28.15 kg/m²      Intake/Output Summary (Last 24 hours) at 2024 0914  Last data filed at 2024 0806  Gross per 24 hour   Intake 429.63 ml   Output 200 ml   Net 229.63 ml     Wt Readings from Last 1 Encounters:   24 164 lb 0.4 oz (74.4 kg)       Exam  Vital signs: Blood pressure (!) 65/57, pulse 76, temperature 99.7 °F (37.6 °C), temperature source Esophageal, resp. rate 11, weight 164 lb 0.4 oz (74.4 kg), SpO2 100%.    General: No acute distress.   HEENT: Moist mucous membranes. EOMI. ETT  Neck:  No JVD.   Respiratory: Clear to auscultation bilaterally.    Cardiovascular: S1, S2.  Regular rate and rhythm.   Abdomen: Soft, nontender, nondistended.    Neurologic: No focal neurological deficits.  Musculoskeletal: Full range of motion of all extremities.  No swelling noted.  Access:AVF    Results:     Recent Labs   Lab 24  1204 24  0438 24  0427   RBC 3.82 4.33 3.24*   HGB 11.8* 13.1 10.0*   HCT 35.5* 41.9 30.2*   MCV 92.9 96.8 93.2   MCH 30.9 30.3 30.9   MCHC 33.2 31.3 33.1   RDW 16.0* 15.8* 16.1*   NEPRELIM 7.38 16.42* 9.33*   WBC 9.1 17.9* 11.2*   .0 188.0 141.0*         Recent Labs   Lab 24  0438 24  0755 24  0427   * 176* 117*   BUN 59* 62* 55*   CREATSERUM 5.55* 5.38* 4.88*   CA 9.5 9.5 9.9    140 139   K 4.3 4.4 3.5    104 98   CO2 26.0 25.0 29.0          XR CHEST AP PORTABLE  (CPT=71045)    Result Date: 2024  CONCLUSION:   Interval  intubation with the endotracheal tube tip approximately 3.4 cm of the jono.  Enteric tube terminates in the proximal gastric body.  No other significant interval change.    Dictated by (CST): Kevin Hensley MD on 9/29/2024 at 3:47 PM     Finalized by (CST): Kevin Hensley MD on 9/29/2024 at 3:49 PM          CT HIP(BONE) LEFT (CPT=73700)    Result Date: 9/28/2024  CONCLUSION:   1. Acute, mildly displaced, and mildly impacted fracture of the left femoral neck.  2. No left hip dislocation.  The left femur is in a varus configuration. 3. Soft tissue swelling about the left hip with a small left hip joint effusion. 4. Prostatomegaly. 5. Circumferential bladder wall thickening, which may be secondary to chronic outlet obstruction or underdistention. 6. Lesser incidental findings described above.    Dictated by (CST): Vernon Law MD on 9/28/2024 at 9:22 PM     Finalized by (CST): Vernon Law MD on 9/28/2024 at 9:29 PM          XR CHEST AP PORTABLE  (CPT=71045)    Result Date: 9/28/2024  CONCLUSION:   Mild cardiomegaly with interstitial and alveolar pulmonary edema.  Other superimposed infiltrate not excluded.      Dictated by (CST): Apple Pierre MD on 9/28/2024 at 3:25 PM     Finalized by (CST): Apple Pierre MD on 9/28/2024 at 3:26 PM          XR SHOULDER, COMPLETE (MIN 2 VIEWS), LEFT (CPT=73030)    Result Date: 9/28/2024  CONCLUSION:   No acute fracture or dislocation.  1.3 cm glenohumeral joint ossification, may represent a prominent osteophyte or a joint body.    Dictated by (CST): Apple Pierre MD on 9/28/2024 at 1:57 PM     Finalized by (CST): Apple Pierre MD on 9/28/2024 at 1:58 PM          XR HIP W OR WO PELVIS 2 OR 3 VIEWS, LEFT (CPT=73502)    Result Date: 9/28/2024  CONCLUSION:   Acute moderately impacted, mildly displaced fracture of the intertrochanteric left femur, as above.   Left hip joint space is maintained.      Dictated by (CST): Apple Pierre MD on 9/28/2024 at 1:56 PM      Finalized by (CST): Apple Pierre MD on 9/28/2024 at 1:57 PM          CT SPINE CERVICAL (CPT=72125)    Result Date: 9/28/2024  CONCLUSION:   No acute fracture or traumatic listhesis of the cervical spine.  Multiple additional chronic findings are not significantly changed when compared to 01/27/2023 and are described in detail above.    Dictated by (CST): Vernon Law MD on 9/28/2024 at 1:24 PM     Finalized by (CST): Vernon Law MD on 9/28/2024 at 1:30 PM          CT BRAIN OR HEAD (CPT=70450)    Result Date: 9/28/2024  CONCLUSION:   1. No acute intracranial hemorrhage, midline shift, mass effect, or hydrocephalus. 2. No transcortical area of hypoattenuation to suggest an acute infarct.  If there is clinical concern for an acute infarct, consider further evaluation with an MRI of the brain. 3. Moderate chronic microvascular ischemic changes with a small chronic left thalamus infarct. 4. No displaced calvarial fracture. 5. Lesser incidental findings described above.    Dictated by (CST): Vernon Law MD on 9/28/2024 at 1:17 PM     Finalized by (CST): Vernon Law MD on 9/28/2024 at 1:24 PM           Assessment and Plan:     79 year old male with past medical history of T2DM, HTN, HLD, ESRD on HD MWF, CAD s/p PCI (5/2024), A.fib on Eliquis, HFpEF, KRAIG, BPH, history of recurrent gastrointestinal bleeding presented to the ER after a fall sustaining a right hip fracture    Impression:    ESRD, HD Monday Wednesday Friday  Hypoxic respiratory failure, possible aspiration; now intubated  Right hip fracture, Ortho following  Hypertension with ESRD  Diabetes with nephropathy  A-fib  History of GI bleed  Anemia, globin is stable  Secondary hyperparathyroidism      Plan:    HD Monday Wednesday schedule      Thank you very much for allowing me to participate in the care of your patient.  If you have any questions, please do not hesitate to contact me.     Moni Pugh MD  9/30/2024

## 2024-09-30 NOTE — PLAN OF CARE
Problem: RESPIRATORY - ADULT  Goal: Achieves optimal ventilation and oxygenation  Description: INTERVENTIONS:  - Assess for changes in respiratory status  - Assess for changes in mentation and behavior  - Position to facilitate oxygenation and minimize respiratory effort  - Oxygen supplementation based on oxygen saturation or ABGs  - Provide Smoking Cessation handout, if applicable  - Encourage broncho-pulmonary hygiene including cough, deep breathe, Incentive Spirometry  - Assess the need for suctioning and perform as needed  - Assess and instruct to report SOB or any respiratory difficulty  - Respiratory Therapy support as indicated  - Manage/alleviate anxiety  - Monitor for signs/symptoms of CO2 retention  Outcome: Progressing    Received patient on full vent support of A/C 14, , PEEP +5, FIO2 40%, dorothea. Breath sound auscultated and suction provided as needed. Decreased fio2 30%, pt tolerating well with ventilator.    Patient went to CT and come back safely, plan to do cpap trial tomorrow morning,        09/30/24 1715   Vent Information   Vent Mode VC/AC   Settings   FiO2 (%) (S)  30 %   Resp Rate (Set) 14   Vt (Set, mL) 500 mL   Waveform Decelerating ramp   PEEP/CPAP (cm H2O) 5 cm H20   Peak Flow LPM 60   Trigger Sensitivity Flow (L/min) 1 L/min   Trigger Sensitivity Pressure (cm H2O) 3 cm H2O   Humidification Heat and moisture exchanger   Readings   Total RR 16   Minute Ventilation (L/min) 6.3 L/min   Expiratory Tidal Volume 440 mL   PIP Observed (cm H2O) 25 cm H2O   MAP (cm H2O) 9   I/E Ratio 1:5.2   Plateau Pressure (cm H2O) 21 cm H2O   Static Compliance (L/cm H2O) 28   Dynamic Compliance (L/cm H2O) 22 L/cm H2O   Airway Resistance 8   Alarms   High RR 40   Insp Pressure High (cm H2O) 40 cm H2O   Insp Pressure Low (cm H2O) 10 cm H2O   MV High (L/min) 20 L/min   MV Low (L/min) 3 L/min   Apnea Interval (sec) 20 seconds   Apnea Rate 14   Apnea Volume (mL) 500 mL

## 2024-10-01 ENCOUNTER — TELEPHONE (OUTPATIENT)
Dept: INTERNAL MEDICINE CLINIC | Facility: CLINIC | Age: 79
End: 2024-10-01

## 2024-10-01 ENCOUNTER — APPOINTMENT (OUTPATIENT)
Dept: ULTRASOUND IMAGING | Facility: HOSPITAL | Age: 79
End: 2024-10-01
Attending: STUDENT IN AN ORGANIZED HEALTH CARE EDUCATION/TRAINING PROGRAM
Payer: MEDICARE

## 2024-10-01 LAB
ALBUMIN SERPL-MCNC: 3.9 G/DL (ref 3.2–4.8)
ALBUMIN/GLOB SERPL: 1.3 {RATIO} (ref 1–2)
ALP LIVER SERPL-CCNC: 63 U/L
ALT SERPL-CCNC: 9 U/L
ANION GAP SERPL CALC-SCNC: 11 MMOL/L (ref 0–18)
AST SERPL-CCNC: 22 U/L (ref ?–34)
BASOPHILS # BLD AUTO: 0.05 X10(3) UL (ref 0–0.2)
BASOPHILS NFR BLD AUTO: 0.5 %
BILIRUB SERPL-MCNC: 1.2 MG/DL (ref 0.2–1.1)
BUN BLD-MCNC: 36 MG/DL (ref 9–23)
BUN/CREAT SERPL: 9 (ref 10–20)
CALCIUM BLD-MCNC: 9.8 MG/DL (ref 8.7–10.4)
CHLORIDE SERPL-SCNC: 99 MMOL/L (ref 98–112)
CO2 SERPL-SCNC: 29 MMOL/L (ref 21–32)
CREAT BLD-MCNC: 3.98 MG/DL
DEPRECATED RDW RBC AUTO: 57.1 FL (ref 35.1–46.3)
EGFRCR SERPLBLD CKD-EPI 2021: 15 ML/MIN/1.73M2 (ref 60–?)
EOSINOPHIL # BLD AUTO: 0.41 X10(3) UL (ref 0–0.7)
EOSINOPHIL NFR BLD AUTO: 3.8 %
ERYTHROCYTE [DISTWIDTH] IN BLOOD BY AUTOMATED COUNT: 16.6 % (ref 11–15)
GLOBULIN PLAS-MCNC: 3.1 G/DL (ref 2–3.5)
GLUCOSE BLD-MCNC: 135 MG/DL (ref 70–99)
GLUCOSE BLDC GLUCOMTR-MCNC: 126 MG/DL (ref 70–99)
GLUCOSE BLDC GLUCOMTR-MCNC: 149 MG/DL (ref 70–99)
GLUCOSE BLDC GLUCOMTR-MCNC: 166 MG/DL (ref 70–99)
GLUCOSE BLDC GLUCOMTR-MCNC: 176 MG/DL (ref 70–99)
HCT VFR BLD AUTO: 31.8 %
HGB BLD-MCNC: 10.5 G/DL
IMM GRANULOCYTES # BLD AUTO: 0.05 X10(3) UL (ref 0–1)
IMM GRANULOCYTES NFR BLD: 0.5 %
LYMPHOCYTES # BLD AUTO: 0.7 X10(3) UL (ref 1–4)
LYMPHOCYTES NFR BLD AUTO: 6.5 %
MCH RBC QN AUTO: 30.9 PG (ref 26–34)
MCHC RBC AUTO-ENTMCNC: 33 G/DL (ref 31–37)
MCV RBC AUTO: 93.5 FL
MONOCYTES # BLD AUTO: 0.51 X10(3) UL (ref 0.1–1)
MONOCYTES NFR BLD AUTO: 4.8 %
NEUTROPHILS # BLD AUTO: 8.99 X10 (3) UL (ref 1.5–7.7)
NEUTROPHILS # BLD AUTO: 8.99 X10(3) UL (ref 1.5–7.7)
NEUTROPHILS NFR BLD AUTO: 83.9 %
OSMOLALITY SERPL CALC.SUM OF ELEC: 298 MOSM/KG (ref 275–295)
PLATELET # BLD AUTO: 148 10(3)UL (ref 150–450)
POTASSIUM SERPL-SCNC: 3.5 MMOL/L (ref 3.5–5.1)
PROT SERPL-MCNC: 7 G/DL (ref 5.7–8.2)
RBC # BLD AUTO: 3.4 X10(6)UL
SODIUM SERPL-SCNC: 139 MMOL/L (ref 136–145)
WBC # BLD AUTO: 10.7 X10(3) UL (ref 4–11)

## 2024-10-01 PROCEDURE — 93970 EXTREMITY STUDY: CPT | Performed by: STUDENT IN AN ORGANIZED HEALTH CARE EDUCATION/TRAINING PROGRAM

## 2024-10-01 PROCEDURE — 99233 SBSQ HOSP IP/OBS HIGH 50: CPT | Performed by: HOSPITALIST

## 2024-10-01 PROCEDURE — 99232 SBSQ HOSP IP/OBS MODERATE 35: CPT | Performed by: INTERNAL MEDICINE

## 2024-10-01 RX ORDER — DEXMEDETOMIDINE HYDROCHLORIDE 4 UG/ML
INJECTION, SOLUTION INTRAVENOUS CONTINUOUS
Status: DISCONTINUED | OUTPATIENT
Start: 2024-10-01 | End: 2024-10-05

## 2024-10-01 RX ORDER — HEPARIN SODIUM 5000 [USP'U]/ML
5000 INJECTION, SOLUTION INTRAVENOUS; SUBCUTANEOUS EVERY 8 HOURS SCHEDULED
Status: DISCONTINUED | OUTPATIENT
Start: 2024-10-01 | End: 2024-10-04

## 2024-10-01 RX ORDER — ALBUMIN (HUMAN) 12.5 G/50ML
25 SOLUTION INTRAVENOUS
Status: DISPENSED | OUTPATIENT
Start: 2024-10-01 | End: 2024-10-03

## 2024-10-01 NOTE — CONSULTS
[facility] CARDIOLOGY CONSULT      Ollie Hernández is a 79 year old male who I assessed as follows:  Chief Complaint   Patient presents with    Fall          REASON FOR CONSULTATION:    BP elevation            ASSESSMENT/PLAN:     IMPRESSIONS:  Hip fracture  Acute resp failure, aspiration and/or pulmonary edema  ESRD on HD  DM  PAF, currently SR. S/p watchman device 9/11/24  HTN  HL, on statin PTA  Acute on chronic diastolic CHF  CAD s/p [place] circ 5/21/24 ]  Coffee grounds in OG tube  Elevated troponin, likely demand ischemia        PLAN:  IV BP meds PRN for now in addition to the carvedilol  For marked elevation in BP due to irritation from vent tube, consider increasing sedation first  For recent watchman device, needs to be on ASA/plavix for at least several months until device endothelializes. Currently on subcutaneous heparin. ASA/plavix on hold for coffee ground in OG tube and planned surgical repair hip. Consider change to IV heparin in near future.            --------------------------------------------------------------------------------------------------------------------------------    HPI:         Admitted with left femoral neck fracture, was to be repaired, then apparently had aspiration and/or pulmonary edema.  Now intubated and on vent for respiratory failure. Awake and irritated by vent. SBP increased. Carvedilol resumed last night. Then IV levophed for hypotension.     Watchman device placed 9/11/24. Placed on ASA/plavix for months afterward until device endothelializes. Currently on subcutaneous heparin.     Coffee ground output from OG tube. On IV protonix.             No current outpatient medications on file.      Past Medical History:    Anemia    Asthma (HCC)    Back problem    BPH (benign prostatic hyperplasia)    Calculus of kidney    Cataract    Coronary atherosclerosis    Diabetes (HCC)    ESRD (end stage renal disease) on dialysis (HCC)    Essential hypertension    High blood  pressure    High cholesterol    History of blood transfusion    Hyperlipidemia    Neuropathy    hands and feet    KRAIG on CPAP    Renal disorder    Sleep apnea    Visual impairment    glasses    Vocal cord paralysis, unilateral partial     Past Surgical History:   Procedure Laterality Date    Appendectomy          Back surgery      Neck/back -     Capsule endoscopy - internal referral      Cataract      2021 and 2022    Cath bare metal stent (bms)      Colonoscopy      Colonoscopy N/A 2021    Procedure: COLONOSCOPY;  Surgeon: Michael Gautam MD;  Location: [facility] ENDO    Colonoscopy N/A 2024    Procedure: COLONOSCOPY;  Surgeon: Gabriel Saldana MD;  Location: [facility] ENDOSCOPY    Colonoscopy N/A 06/15/2024    Procedure: COLONOSCOPY;  Surgeon: Carlyn Storey MD;  Location: [facility] ENDOSCOPY    Colonoscopy N/A 2024    Procedure: COLONOSCOPY;  Surgeon: Gabriel Saldana MD;  Location: [facility] ENDOSCOPY    Hand/finger surgery unlisted      Accidental trauma    Spine surgery procedure unlisted      Upper gi endoscopy,diagnosis       Family History   Problem Relation Age of Onset    Cancer Father         Kidney    Breast Cancer Mother     Diabetes Mother     Diabetes Maternal Grandmother     Diabetes Maternal Grandfather     Heart Disorder Other         Uncle      Social History:  Social History     Socioeconomic History    Marital status:    Tobacco Use    Smoking status: Former     Current packs/day: 0.00     Average packs/day: 1 pack/day for 17.0 years (17.0 ttl pk-yrs)     Types: Cigarettes     Start date: 1964     Quit date: 1981     Years since quittin.7    Smokeless tobacco: Never   Vaping Use    Vaping status: Never Used   Substance and Sexual Activity    Alcohol use: No     Alcohol/week: 0.0 standard drinks of alcohol    Drug use: No    Sexual activity: Yes     Partners: Female     Social Determinants of Health     Financial Resource Strain: Low Risk  (2024)    Received from  [facility]    Financial Resource Strain     In the past year, have you or any family members you live with been unable to get any of the following when it was really needed? Check all that apply.: None   Food Insecurity: No Food Insecurity (9/28/2024)    Food Insecurity     Food Insecurity: Never true   Transportation Needs: No Transportation Needs (9/28/2024)    Transportation Needs     Lack of Transportation: No   Recent Concern: Transportation Needs - At Risk (9/11/2024)    Received from [facility]    Transportation Needs     In the past 12 months, has lack of reliable transportation kept you from medical appointments, meetings, work or from getting things needed for daily living? : Yes   Social Connections: Low Risk  (9/11/2024)    Received from [facility]    Social Connections     How often do you see or talk to people that you care about and feel close to? (For example: talking to friends on the phone, visiting friends or family, going to [employer] or club meetings): 5 or more times a week   Housing Stability: Low Risk  (9/28/2024)    Housing Stability     Housing Instability: No          Medications:  Current Facility-Administered Medications   Medication Dose Route Frequency    norepinephrine (Levophed) 4 mg/250mL infusion premix  0.5-30 mcg/min Intravenous Continuous    sodium chloride 0.9 % IV bolus 100 mL  100 mL Intravenous Q30 Min PRN    And    albumin human (Albumin) 25% injection 25 g  25 g Intravenous PRN Dialysis    lidocaine-menthol 4-1 % patch 2 patch  2 patch Transdermal Daily    dexmedeTOMIDine in sodium chloride 0.9% (Precedex) 400 mcg/100mL infusion premix  0.2-1.5 mcg/kg/hr (Dosing Weight) Intravenous Continuous    heparin (Porcine) 5000 UNIT/ML injection 5,000 Units  5,000 Units Subcutaneous Q8H STEPHANIE    fentaNYL (Sublimaze) 25 mcg BOLUS FROM BAG infusion  25 mcg Intravenous PRN    pantoprazole (Protonix) 40 mg in sodium chloride 0.9% PF 10 mL IV push  40 mg  Intravenous Q12H    piperacillin-tazobactam (Zosyn) 3.375 g in dextrose 5% 100 mL IVPB-ADDV  3.375 g Intravenous Q12H    hydrALAzine (Apresoline) 20 mg/mL injection 10 mg  10 mg Intravenous Q6H PRN    labetalol (Trandate) 5 mg/mL injection 10 mg  10 mg Intravenous Q4H PRN    senna (Senokot) 8.8 MG/5ML oral syrup 17.6 mg  10 mL Per NG Tube Nightly PRN    cetirizine (ZyrTEC) tab 5 mg  5 mg Per NG Tube Daily    carvedilol (Coreg) tab 25 mg  25 mg Per NG Tube BID    atorvastatin (Lipitor) tab 40 mg  40 mg Per NG Tube Nightly    acetaminophen (Tylenol) 160 MG/5ML oral liquid 500 mg  500 mg Per NG Tube Daily PRN    acetaminophen (Tylenol) tab 650 mg  650 mg Oral Q4H PRN    Or    acetaminophen (Tylenol) 160 MG/5ML oral liquid 650 mg  650 mg Per NG Tube Q4H PRN    Or    acetaminophen (Tylenol) rectal suppository 650 mg  650 mg Rectal Q4H PRN    Or    acetaminophen (Ofirmev) 10 mg/mL infusion premix 1,000 mg  1,000 mg Intravenous Q6H PRN    polyethylene glycol (PEG 3350) (Miralax) 17 g oral packet 17 g  17 g Per NG Tube Daily PRN    insulin aspart (NovoLOG) 100 Units/mL FlexPen 1-7 Units  1-7 Units Subcutaneous q6h    heparin (Porcine) 1000 UNIT/ML injection 1,500 Units  1.5 mL Intravenous PRN Dialysis    lidocaine-prilocaine (Emla) 2.5-2.5 % cream   Topical PRN    bisacodyl (Dulcolax) 10 MG rectal suppository 10 mg  10 mg Rectal Daily PRN    chlorhexidine gluconate (Peridex) 0.12 % oral solution 15 mL  15 mL Mouth/Throat BID@0800,2000    propofol (Diprivan) 10 mg/mL infusion premix  5-50 mcg/kg/min (Dosing Weight) Intravenous Continuous    fentaNYL in sodium chloride 0.9% (Sublimaze) 1000 mcg/100mL infusion premix   mcg/hr Intravenous Continuous PRN    albuterol (Ventolin HFA) 108 (90 Base) MCG/ACT inhaler 2 puff  2 puff Inhalation Q4H PRN    fluticasone propionate (Flonase) 50 MCG/ACT nasal suspension 2 spray  2 spray Nasal Daily PRN    glucose (Dex4) 15 GM/59ML oral liquid 15 g  15 g Oral Q15 Min PRN    Or     glucose (Glutose) 40% oral gel 15 g  15 g Oral Q15 Min PRN    Or    glucose-vitamin C (Dex-4) chewable tab 4 tablet  4 tablet Oral Q15 Min PRN    Or    dextrose 50% injection 50 mL  50 mL Intravenous Q15 Min PRN    Or    glucose (Dex4) 15 GM/59ML oral liquid 30 g  30 g Oral Q15 Min PRN    Or    glucose (Glutose) 40% oral gel 30 g  30 g Oral Q15 Min PRN    Or    glucose-vitamin C (Dex-4) chewable tab 8 tablet  8 tablet Oral Q15 Min PRN    ipratropium-albuterol (Duoneb) 0.5-2.5 (3) MG/3ML inhalation solution 3 mL  3 mL Nebulization Q6H PRN    fluticasone-salmeterol (Advair Diskus) 250-50 MCG/ACT inhaler 1 puff  1 puff Inhalation BID           Allergies  Allergies   Allergen Reactions    Adhesive Tape OTHER (SEE COMMENTS)     Severe rashes    Dust Mite Extract RASH               REVIEW OF SYSTEMS:   Intubated, unable to answer questions          EXAM:         TELE:           /50 (BP Location: Right arm)   Pulse 83   Temp 98.5 °F (36.9 °C) (Temporal)   Resp 17   Wt 160 lb 0.9 oz (72.6 kg)   SpO2 100%   BMI 27.47 kg/m²   Temp (24hrs), Av.2 °F (37.3 °C), Min:97.5 °F (36.4 °C), Max:100 °F (37.8 °C)    Wt Readings from Last 3 Encounters:   10/01/24 160 lb 0.9 oz (72.6 kg)   24 159 lb 12.8 oz (72.5 kg)   24 158 lb (71.7 kg)         Intake/Output Summary (Last 24 hours) at 10/1/2024 1410  Last data filed at 10/1/2024 1130  Gross per 24 hour   Intake 1194.19 ml   Output 3600 ml   Net -2405.81 ml         GENERAL: intubated, awake  SKIN: no rashes  HEENT: atraumatic, normocephalic, throat without erythema  NECK: supple, no bruits  LUNGS: clear to auscultation  CARDIO: RRR without murmur or S3   GI: soft, nontender  EXTREMITIES: no cyanosis, clubbing or edema  NEUROLOGY: awake  PSYCH: awake          LABORATORY DATA:               ECG:        ECHO:          Labs:  Recent Labs   Lab 24  0755 24  0427 10/01/24  0330   * 117* 135*   BUN 62* 55* 36*   CREATSERUM 5.38* 4.88* 3.98*   CA  9.5 9.9 9.8    139 139   K 4.4 3.5 3.5    98 99   CO2 25.0 29.0 29.0     No results for input(s): \"TROP\" in the last 168 hours.  Recent Labs   Lab 10/01/24  0330   RBC 3.40*   HGB 10.5*   HCT 31.8*   MCV 93.5   MCH 30.9   MCHC 33.0   RDW 16.6*   NEPRELIM 8.99*   WBC 10.7   .0*     Recent Labs   Lab 09/29/24  0755   TROPHS 56*       Imaging:  US VENOUS DOPPLER LEG BILAT - DIAG IMG (CPT=93970)    Result Date: 10/1/2024  CONCLUSION:  Negative for sonographic evidence of deep venous thrombosis in either lower extremity.    Dictated by (CST): Yung Rios MD on 10/01/2024 at 6:51 AM     Finalized by (CST): Yung Rios MD on 10/01/2024 at 6:51 AM          CT CHEST PE AORTA (IV ONLY) (CPT=71260)    Result Date: 9/30/2024  CONCLUSION:  1. No evidence of acute pulmonary embolism to the level of the proximal segmental pulmonary artery branches.  2. Bilateral pleural effusions and associated basilar atelectasis, with or without superimposed pneumonia.  3. Mild pulmonary interstitial edema is observed.  4. Dilatation of the main pulmonary artery trunk may relate to underlying pulmonary hypertension.  5. Mild ascending thoracic aortic ectasia.  6. Mild centrilobular emphysema.  7. Additional support tubes and lines as above.  8. A 1.9 cm right thyroid lobe nodule is present. If not previously worked up, follow-up nonemergent thyroid sonography can be considered for further assessment.  9. Lesser incidental findings as above.      Dictated by (CST): Yung Rios MD on 9/30/2024 at 11:44 AM     Finalized by (CST): Yung Rios MD on 9/30/2024 at 11:54 AM          XR CHEST AP PORTABLE  (CPT=71045)    Result Date: 9/29/2024  CONCLUSION:   Interval intubation with the endotracheal tube tip approximately 3.4 cm of the jono.  Enteric tube terminates in the proximal gastric body.  No other significant interval change.    Dictated by (CST): Kevin Hensley MD on 9/29/2024 at 3:47 PM     Finalized by  (CST): Kevin Hensley MD on 9/29/2024 at 3:49 PM          CT HIP(BONE) LEFT (CPT=73700)    Result Date: 9/28/2024  CONCLUSION:   1. Acute, mildly displaced, and mildly impacted fracture of the left femoral neck.  2. No left hip dislocation.  The left femur is in a varus configuration. 3. Soft tissue swelling about the left hip with a small left hip joint effusion. 4. Prostatomegaly. 5. Circumferential bladder wall thickening, which may be secondary to chronic outlet obstruction or underdistention. 6. Lesser incidental findings described above.    Dictated by (CST): Vernon Law MD on 9/28/2024 at 9:22 PM     Finalized by (CST): Vernon Law MD on 9/28/2024 at 9:29 PM          XR CHEST AP PORTABLE  (CPT=71045)    Result Date: 9/28/2024  CONCLUSION:   Mild cardiomegaly with interstitial and alveolar pulmonary edema.  Other superimposed infiltrate not excluded.      Dictated by (CST): Apple Pierre MD on 9/28/2024 at 3:25 PM     Finalized by (CST): Apple Pierre MD on 9/28/2024 at 3:26 PM                Emerson Julio MD

## 2024-10-01 NOTE — PROGRESS NOTES
[facility]  part of [facility]    Progress Note    Ollie Hernández Patient Status:  Inpatient    1945 MRN [id number]   Location [facility] 2W/SW Attending Radha Gabriel MD   Hosp Day # 3 PCP Wili Parmar MD       Subjective:   Ollie Hernández is a(n) 79 year old male who I am seeing for ESRD    Intubated  No overnight issues      Objective:   /50 (BP Location: Right arm)   Pulse 81   Temp 99.2 °F (37.3 °C) (Temporal)   Resp 14   Wt 160 lb 0.9 oz (72.6 kg)   SpO2 100%   BMI 27.47 kg/m²      Intake/Output Summary (Last 24 hours) at 10/1/2024 1211  Last data filed at 10/1/2024 1130  Gross per 24 hour   Intake 1224.19 ml   Output 3600 ml   Net -2375.81 ml     Wt Readings from Last 1 Encounters:   10/01/24 160 lb 0.9 oz (72.6 kg)       Exam  Vital signs: Blood pressure 106/50, pulse 81, temperature 99.2 °F (37.3 °C), temperature source Temporal, resp. rate 14, weight 160 lb 0.9 oz (72.6 kg), SpO2 100%.    General: No acute distress.   HEENT: Moist mucous membranes. EOMI. ETT  Neck:  No JVD.   Respiratory: Clear to auscultation bilaterally.    Cardiovascular: S1, S2.  Regular rate and rhythm.   Abdomen: Soft, nontender, nondistended.    Neurologic: No focal neurological deficits.  Musculoskeletal: Full range of motion of all extremities.  No swelling noted.  Access:AVF    Results:     Recent Labs   Lab 24  0438 24  0427 24  1620 10/01/24  0330   RBC 4.33 3.24*  --  3.40*   HGB 13.1 10.0* 9.9* 10.5*   HCT 41.9 30.2* 28.5* 31.8*   MCV 96.8 93.2  --  93.5   MCH 30.3 30.9  --  30.9   MCHC 31.3 33.1  --  33.0   RDW 15.8* 16.1*  --  16.6*   NEPRELIM 16.42* 9.33*  --  8.99*   WBC 17.9* 11.2*  --  10.7   .0 141.0*  --  148.0*         Recent Labs   Lab 24  0755 24  0427 10/01/24  0330   * 117* 135*   BUN 62* 55* 36*   CREATSERUM 5.38* 4.88* 3.98*   CA 9.5 9.9 9.8    139 139   K 4.4 3.5 3.5    98 99   CO2 25.0 29.0 29.0           US VENOUS DOPPLER LEG BILAT - DIAG IMG (CPT=93970)    Result Date: 10/1/2024  CONCLUSION:  Negative for sonographic evidence of deep venous thrombosis in either lower extremity.    Dictated by (CST): Yung Rios MD on 10/01/2024 at 6:51 AM     Finalized by (CST): Yung Rios MD on 10/01/2024 at 6:51 AM          CT CHEST PE AORTA (IV ONLY) (CPT=71260)    Result Date: 9/30/2024  CONCLUSION:  1. No evidence of acute pulmonary embolism to the level of the proximal segmental pulmonary artery branches.  2. Bilateral pleural effusions and associated basilar atelectasis, with or without superimposed pneumonia.  3. Mild pulmonary interstitial edema is observed.  4. Dilatation of the main pulmonary artery trunk may relate to underlying pulmonary hypertension.  5. Mild ascending thoracic aortic ectasia.  6. Mild centrilobular emphysema.  7. Additional support tubes and lines as above.  8. A 1.9 cm right thyroid lobe nodule is present. If not previously worked up, follow-up nonemergent thyroid sonography can be considered for further assessment.  9. Lesser incidental findings as above.      Dictated by (CST): Yung Rios MD on 9/30/2024 at 11:44 AM     Finalized by (CST): Yung Rios MD on 9/30/2024 at 11:54 AM          XR CHEST AP PORTABLE  (CPT=71045)    Result Date: 9/29/2024  CONCLUSION:   Interval intubation with the endotracheal tube tip approximately 3.4 cm of the jono.  Enteric tube terminates in the proximal gastric body.  No other significant interval change.    Dictated by (CST): Kevin Hensley MD on 9/29/2024 at 3:47 PM     Finalized by (CST): Kevin Hensley MD on 9/29/2024 at 3:49 PM           Assessment and Plan:     79 year old male with past medical history of T2DM, HTN, HLD, ESRD on HD MWF, CAD s/p PCI (5/2024), A.fib on Eliquis, HFpEF, KRAIG, BPH, history of recurrent gastrointestinal bleeding presented to the ER after a fall sustaining a left hip fracture    Impression:    ESRD, HD  Monday Wednesday Friday  Hypoxic respiratory failure, possible aspiration; now intubated  Left hip fracture, Ortho following  Hypertension with ESRD  Diabetes with nephropathy  A-fib s/p watchman  History of GI bleed  Anemia, hgb is stable  Secondary hyperparathyroidism, monitor calcium and phosphorus      Plan:    HD Monday Wednesday schedule  Will order for tomorrow    Thank you very much for allowing me to participate in the care of your patient.  If you have any questions, please do not hesitate to contact me.     Moni Pugh MD

## 2024-10-01 NOTE — PAYOR COMM NOTE
--------------  ADMISSION REVIEW     Payor: [employer]  Subscriber #:  [id number]  Authorization Number: [id number]    Admit date: 9/28/24  Admit time:  4:39 PM       REVIEW DOCUMENTATION:     ED Provider Notes        ED Provider Notes signed by Vitor Kline MD at 9/28/2024  3:08 PM       Author: Vitor Kline MD Service: -- Author Type: Physician    Filed: 9/28/2024  3:08 PM Date of Service: 9/28/2024 12:12 PM Status: Signed    : Vitor Kline MD (Physician)         Patient Seen in: [facility] Emergency Department    History     Chief Complaint   Patient presents with    Fall       HPI    79-year-old male with past medical history significant for BPH, incisional disease on dialysis, diabetes, CAD, high blood pressure, high cholesterol, sleep apnea, obesity, presents ED for evaluation of left hip pain, left shoulder pain, head injury, after he lost his balance and fell when he was putting on his shoes.  He denies any loss of consciousness, headache, nausea.  He states that any attempt to move his left hip causes significant discomfort.  He denies any chest pain or palpitations.    History from [facility]: Initial history given by EMS was that patient had fallen in the bathroom when trying to get his shoes on.  They state that patient had a Watchman procedure completed 2 weeks ago for atrial fibrillation.    External Records Reviewed: Reviewed prior records available in EMR and patient was seen at [facility] on 11 March and had Watchman procedure completed at that time.  Postoperative course was uneventful.    History reviewed.   Past Medical History:    Anemia    Asthma (HCC)    Back problem    BPH (benign prostatic hyperplasia)    Calculus of kidney    Cataract    Coronary atherosclerosis    Diabetes (HCC)    ESRD (end stage renal disease) on dialysis (HCC)    Essential hypertension    High blood pressure    High cholesterol    History of blood transfusion     Hyperlipidemia    Neuropathy    hands and feet    KRAIG on CPAP    Renal disorder    Sleep apnea    Visual impairment    glasses    Vocal cord paralysis, unilateral partial       History reviewed.   Past Surgical History:   Procedure Laterality Date    Appendectomy          Back surgery      Neck/back -     Capsule endoscopy - internal referral      Cataract      2021 and 2022    Cath bare metal stent (bms)      Colonoscopy      Colonoscopy N/A 2021    Procedure: COLONOSCOPY;  Surgeon: Michael Gautam MD;  Location: [facility] ENDO    Colonoscopy N/A 2024    Procedure: COLONOSCOPY;  Surgeon: Gabriel Saldana MD;  Location: [facility] ENDOSCOPY    Colonoscopy N/A 06/15/2024    Procedure: COLONOSCOPY;  Surgeon: Carlyn Storey MD;  Location: [facility] ENDOSCOPY    Colonoscopy N/A 2024    Procedure: COLONOSCOPY;  Surgeon: Gabriel Saldana MD;  Location: [facility] ENDOSCOPY    Hand/finger surgery unlisted      Accidental trauma    Spine surgery procedure unlisted      Upper gi endoscopy,diagnosis           Medications :  (Not in a hospital admission)       Family History   Problem Relation Age of Onset    Cancer Father         Kidney    Breast Cancer Mother     Diabetes Mother     Diabetes Maternal Grandmother     Diabetes Maternal Grandfather     Heart Disorder Other         Uncle       Smoking Status:   Social History     Socioeconomic History    Marital status:    Tobacco Use    Smoking status: Former     Current packs/day: 0.00     Average packs/day: 1 pack/day for 17.0 years (17.0 ttl pk-yrs)     Types: Cigarettes     Start date: 1964     Quit date: 1981     Years since quittin.7    Smokeless tobacco: Never   Vaping Use    Vaping status: Never Used   Substance and Sexual Activity    Alcohol use: No     Alcohol/week: 0.0 standard drinks of alcohol    Drug use: No    Sexual activity: Yes     Partners: Female       Constitutional and vital signs reviewed.      Social History and Family History  elements reviewed from today, pertinent positives to the presenting problem noted.    Physical Exam     ED Triage Vitals   BP 09/28/24 1204 127/48   Pulse 09/28/24 1204 68   Resp 09/28/24 1204 18   Temp 09/28/24 1203 98.1 °F (36.7 °C)   Temp src 09/28/24 1203 Temporal   SpO2 09/28/24 1204 94 %   O2 Device 09/28/24 1503 None (Room air)       Physical Exam   Constitutional: AAOx3, well nourished, NAD  HEENT: Normocephalic, PERRLA, MMM  CV: s1s2+, RRR, no m/r/g, normal distal pulses  Pulmonary/Chest: CTA b/l with no rales, wheezes.  No chest wall tenderness  Abdominal: Nontender.  Nondistended. Soft. Bowel sounds are normal.   Neck/Back:   :   Musculoskeletal: Left upper extremity with normal range of motion.  There is abrasion noted to the deltoid.  Left lower extremity with significant tenderness to palpation at the hip joint.  Unable to range on his own.  2+ DP pulse  Neurological: Awake, alert. Normal reflexes. No cranial nerve deficit.    Skin: Skin is warm and dry. No rash noted. No erythema.   Psychiatric:      All measures to prevent infection transmission during my interaction with the patient were taken. The patient was already wearing a droplet mask on my arrival to the room. Personal protective equipment was worn throughout the duration of the exam.      ED Course        Labs Reviewed   CBC WITH DIFFERENTIAL WITH PLATELET - Abnormal; Notable for the following components:       Result Value    HGB 11.8 (*)     HCT 35.5 (*)     RDW-SD 55.1 (*)     RDW 16.0 (*)     All other components within normal limits   COMP METABOLIC PANEL (14) - Abnormal; Notable for the following components:    Glucose 178 (*)     BUN 59 (*)     Creatinine 4.85 (*)     Calculated Osmolality 313 (*)     eGFR-Cr 12 (*)     All other components within normal limits   URINALYSIS WITH CULTURE REFLEX   RAINBOW DRAW LAVENDER   RAINBOW DRAW LIGHT GREEN   RAINBOW DRAW BLUE   MRSA/SA SCRN BY PCR:[facility] ORTHO SURG ONLY     My Independent  Interpretation of EKG (if performed): EKG    Rate, intervals and axes as noted on EKG Report.  Rate: 76 bpm  Rhythm: Sinus Rhythm  Reading: Normal sinus rhythm, occasional PVCs, no acute ST changes, normal axis and intervals             Monitor Interpretation:   normal sinus rhythm, occasional premature ventricular beats as interpreted by me.      Imaging Results Available and Reviewed while in ED: XR SHOULDER, COMPLETE (MIN 2 VIEWS), LEFT (CPT=73030)    Result Date: 9/28/2024  CONCLUSION:   No acute fracture or dislocation.  1.3 cm glenohumeral joint ossification, may represent a prominent osteophyte or a joint body.    Dictated by (CST): Apple Pierre MD on 9/28/2024 at 1:57 PM     Finalized by (CST): Apple Pierre MD on 9/28/2024 at 1:58 PM          XR HIP W OR WO PELVIS 2 OR 3 VIEWS, LEFT (CPT=73502)    Result Date: 9/28/2024  CONCLUSION:   Acute moderately impacted, mildly displaced fracture of the intertrochanteric left femur, as above.   Left hip joint space is maintained.      Dictated by (CST): Apple Pierre MD on 9/28/2024 at 1:56 PM     Finalized by (CST): Apple Pierre MD on 9/28/2024 at 1:57 PM          CT SPINE CERVICAL (CPT=72125)    Result Date: 9/28/2024  CONCLUSION:   No acute fracture or traumatic listhesis of the cervical spine.  Multiple additional chronic findings are not significantly changed when compared to 01/27/2023 and are described in detail above.    Dictated by (CST): Vernon Law MD on 9/28/2024 at 1:24 PM     Finalized by (CST): Vernon Law MD on 9/28/2024 at 1:30 PM          CT BRAIN OR HEAD (CPT=70450)    Result Date: 9/28/2024  CONCLUSION:   1. No acute intracranial hemorrhage, midline shift, mass effect, or hydrocephalus. 2. No transcortical area of hypoattenuation to suggest an acute infarct.  If there is clinical concern for an acute infarct, consider further evaluation with an MRI of the brain. 3. Moderate chronic microvascular ischemic changes with a small  chronic left thalamus infarct. 4. No displaced calvarial fracture. 5. Lesser incidental findings described above.    Dictated by (CST): Vernon Law MD on 9/28/2024 at 1:17 PM     Finalized by (CST): Vernon Law MD on 9/28/2024 at 1:24 PM         ED Medications Administered:   Medications   sodium chloride 0.9 % IV bolus 1,000 mL (1,000 mL Intravenous New Bag 9/28/24 1447)   HYDROcodone-acetaminophen (Norco) 5-325 MG per tab 1 tablet (1 tablet Oral Given 9/28/24 1245)   morphINE PF 4 MG/ML injection 4 mg (4 mg Intravenous Given 9/28/24 1448)   ondansetron (Zofran) 4 MG/2ML injection 4 mg (4 mg Intravenous Given 9/28/24 1448)             MDM     Vitals:    09/28/24 1204 09/28/24 1210 09/28/24 1215 09/28/24 1415   BP: 127/48  122/48 (!) 175/63   Pulse: 68  72 75   Resp: 18  20 20   Temp:       TempSrc:       SpO2: 94% 97% 92% 95%     *I personally reviewed and interpreted all ED vitals.    Independent Interpretation of Studies: I have independently reviewed patient's left hip x-ray and patient has an intertrochanteric fracture.  Left shoulder x-ray, CT brain, CT cervical spine, are all relatively unremarkable    Social Determinants of Health:     Procedures:      Differential/MDM/Shared Decision Making: Differential Diagnosis includes fracture, dislocation, intracranial hemorrhage, contusion, others.      The patient already has recent Watchman procedure, end-stage renal disease on dialysis, CAD, diabetes, BPH, high blood pressure, high cholesterol, sleep apnea, obesity, to contribute to the complexity of this ED evaluation.           Patient does have a left hip fracture on x-ray.  He has been given Norco and morphine for pain control.  Discussed with orthopedics and they will see for consult.  Discussed case with [facility] and they have accepted patient for admission.    Critical care time exclusive of procedure time spent on this patient was 40 min for taking history from patient examining  patient, medical decision-making, reviewing lab work and radiology studies, explaining results to patient and family, discussing with consultants/admitting physician, and documenting in patient's chart.      Condition upon leaving the department: Stable    Disposition and Plan     Clinical Impression:  1. Closed fracture of left hip, initial encounter ([facility])        Disposition:  Admit    Follow-up:  No follow-up provider specified.    Medications Prescribed:  Current Discharge Medication List          Hospital Problems       Present on Admission  Date Reviewed: 8/22/2024            ICD-10-CM Noted POA    * (Principal) Closed fracture of left hip, initial encounter ([facility]) S72.002A 9/28/2024 Unknown            Signed by Vitor Kline MD on 9/28/2024  3:08 PM         MEDICATIONS ADMINISTERED IN LAST 1 DAY:  acetaminophen (Tylenol) tab 650 mg       Date Action Dose Route User    9/30/2024 1627 Given 650 mg Oral Monica Fuentes RN    9/30/2024 0800 Given 650 mg Oral Monica Fuentes RN          carvedilol (Coreg) tab 25 mg       Date Action Dose Route User    9/30/2024 0800 Given 25 mg Oral Monica Fuentes RN          cetirizine (ZyrTEC) tab 5 mg       Date Action Dose Route User    9/30/2024 0800 Given 5 mg Oral Monica Fuentes RN          chlorhexidine gluconate (Peridex) 0.12 % oral solution 15 mL       Date Action Dose Route User    9/30/2024 2000 Given 15 mL Mouth/Throat Stuart Alvarez RN    9/30/2024 1035 Given 15 mL Mouth/Throat Monica Fuentes RN          fentaNYL (Sublimaze) 25 mcg BOLUS FROM BAG infusion       Date Action Dose Route User    9/30/2024 0801 Bolus from Bag 25 mcg Intravenous Monica Fuentes RN          fentaNYL in sodium chloride 0.9% (Sublimaze) 1000 mcg/100mL infusion premix       Date Action Dose Route User    10/1/2024 0506 Hi-Risk Rate/Dose Change 35 mcg/hr Intravenous Stuart Alvarez RN    9/30/2024 1925 New Bag 50 mcg/hr Intravenous Monica Fuentes RN    9/30/2024 0800 Hi-Risk Rate/Dose Change 50 mcg/hr Intravenous  Monica Fuentes RN          hydrALAzine (Apresoline) 20 mg/mL injection 10 mg       Date Action Dose Route User    9/30/2024 1627 Given 10 mg Intravenous Monica Fuentes RN          iopamidol 76% (ISOVUE-370) injection for power injector       Date Action Dose Route User    9/30/2024 1126 Given 80 mL Intravenous Davion Jones          pantoprazole (Protonix) 40 mg in sodium chloride 0.9% PF 10 mL IV push       Date Action Dose Route User    9/30/2024 2217 Given 40 mg Intravenous Stuart Alvarez RN    9/30/2024 1035 Given 40 mg Intravenous Monica Fuentes RN          piperacillin-tazobactam (Zosyn) 3.375 g in dextrose 5% 100 mL IVPB-ADDV       Date Action Dose Route User    9/30/2024 2217 New Bag 3.375 g Intravenous Stuart Alvarez RN    9/30/2024 1509 Restarted (none) Intravenous Salome Shelby RN    9/30/2024 1035 New Bag 3.375 g Intravenous Monica Fuentes RN          propofol (Diprivan) 10 mg/mL infusion premix       Date Action Dose Route User    10/1/2024 0409 New Bag 12 mcg/kg/min × 70.6 kg (Dosing Weight) Intravenous Stuart Alvarez RN    10/1/2024 0005 Rate/Dose Change 12 mcg/kg/min × 70.6 kg (Dosing Weight) Intravenous Stuart Alvarez RN    9/30/2024 2005 Restarted 15 mcg/kg/min × 70.6 kg (Dosing Weight) Intravenous Stuart Alvarez RN    9/30/2024 1510 New Bag 15 mcg/kg/min × 70.6 kg (Dosing Weight) Intravenous Salome Shelby RN    9/30/2024 1509 Rate/Dose Change 15 mcg/kg/min × 70.6 kg (Dosing Weight) Intravenous Monica Fuentes RN    9/30/2024 1430 Rate/Dose Change 30 mcg/kg/min × 70.6 kg (Dosing Weight) Intravenous Monica Fuentes RN            Vitals (last day)       Date/Time Temp Pulse Resp BP SpO2 Weight O2 Device O2 Flow Rate (L/min) [facility]    10/01/24 0600 99.7 °F (37.6 °C) 91 8 -- 100 % 160 lb 0.9 oz (72.6 kg) Ventilator --     10/01/24 0500 99.7 °F (37.6 °C) 81 17 -- 100 % -- Ventilator --     10/01/24 0400 99.3 °F (37.4 °C) 83 14 -- 100 % -- Ventilator --     10/01/24 0300 99.1 °F (37.3 °C) 90 14 -- 100 % --  Ventilator -- RJ    10/01/24 0200 98.8 °F (37.1 °C) 79 14 -- 100 % -- Ventilator -- RJ    10/01/24 0100 99.1 °F (37.3 °C) 81 14 -- 100 % -- Ventilator -- RJ    10/01/24 0005 99.1 °F (37.3 °C) 83 14 -- 100 % -- Ventilator -- RJ    10/01/24 0000 99 °F (37.2 °C) 84 16 -- 100 % -- Ventilator -- RJ    09/30/24 2300 99.5 °F (37.5 °C) 83 14 -- 100 % -- Ventilator -- RJ    09/30/24 2200 98.1 °F (36.7 °C) 80 19 -- 100 % -- Ventilator -- RJ    09/30/24 2100 97.5 °F (36.4 °C) 70 9 -- 100 % -- Ventilator -- RJ    09/30/24 2000 98.4 °F (36.9 °C) 74 16 -- 100 % -- Ventilator -- RJ    09/30/24 1900 99 °F (37.2 °C) 70 11 -- 100 % -- -- -- EF    09/30/24 1800 99.7 °F (37.6 °C) 78 14 -- 100 % -- Ventilator -- EF    09/30/24 1700 99.9 °F (37.7 °C) 76 15 -- 100 % -- Ventilator -- EF    09/30/24 1627 100 °F (37.8 °C) 70 14 -- 100 % -- -- -- EF    09/30/24 1600 99.9 °F (37.7 °C) 75 15 -- 100 % -- Ventilator -- EF    09/30/24 1500 99.7 °F (37.6 °C) 81 14 -- 100 % -- Ventilator -- EF    09/30/24 1415 99.5 °F (37.5 °C) 72 15 -- 100 % -- -- -- EF    09/30/24 1408 99.5 °F (37.5 °C) 80 16 -- 100 % -- -- -- EF    09/30/24 1400 99.5 °F (37.5 °C) 86 16 -- 100 % -- Ventilator -- EF    09/30/24 1300 99.7 °F (37.6 °C) 76 14 -- 100 % -- Ventilator -- EF    09/30/24 1200 99.5 °F (37.5 °C) 83 15 -- 100 % -- Ventilator -- EF    09/30/24 1100 98.8 °F (37.1 °C) 78 9 -- 100 % -- Ventilator -- EF    09/30/24 1000 99 °F (37.2 °C) 71 15 -- 100 % -- Ventilator -- EF    09/30/24 0900 99.1 °F (37.3 °C) 78 16 -- 100 % -- Ventilator -- EF    09/30/24 0813 99.7 °F (37.6 °C) 76 11 -- 100 % -- Ventilator -- EF    09/30/24 0730 101.4 °F (38.6 °C) 82 19 -- 100 % -- -- -- EF    09/30/24 0700 -- 75 15 -- 100 % -- Ventilator -- OM    09/30/24 0600 -- 77 22 -- 100 % -- Ventilator -- OM    09/30/24 0500 -- 84 18 -- 100 % -- Ventilator -- OM    09/30/24 0400 98.8 °F (37.1 °C) 75 15 -- 100 % 164 lb 0.4 oz (74.4 kg) Ventilator -- OM    09/30/24 0300 -- 79 28 -- 100 % --  Ventilator -- OM    09/30/24 0200 -- 84 17 -- 100 % -- Ventilator -- OM    09/30/24 0100 -- 81 12 -- 100 % -- Ventilator -- OM    09/30/24 0000 99.2 °F (37.3 °C) 86 20 -- 100 % -- Ventilator -- OM         9/28 H&P     Patient is a 79-year-old male with past medical history of multiple comorbid conditions but come to the hospital.  Suffering a minimally displaced left femoral neck fracture.  Patient seen examined emergency department currently states that the pain is uncontrolled, orthopedic surgery has been placed on consult plan to get a CT scan of the hip.  Plan is for surgery for tomorrow.     Currently patient denies any chest pain shortness of breath palpitations nausea vomiting is endorsing pain      Physical Exam:   Vital Signs:  Blood pressure (!) 187/64, pulse 72, temperature 98.1 °F (36.7 °C), temperature source Temporal, resp. rate 26, weight 155 lb 11.2 oz (70.6 kg), SpO2 90%.      Assessment/Plan:      Minimally displaced left femoral neck fracture  -Plan to obtain CT scan, today  -Appreciate orthopedic surgery recommendations  -Patient recently had a Watchman procedure done so pain he is currently not on Plavix  -Will currently hold aspirin as well  -Will continue to monitor patient closely  -Continue analgesics and antiemetics as needed     Accelerated hypertension  -Resume patient's oral antihypertensives  -Start the patient on IV hypertensives with SBP greater than 165  -Continue to monitor on telemetry  -Titrate meds as needed     Diabetes  -Start the patient on Accu-Cheks before every meal and at bedtime  -Hold oral hypoglycemic agents  -Supplement insulin sliding scale     ESRD on HD  -Nephrology placed on consult  -Dialysis per nephrology     History of CAD  -Continue home medications     HL  -Continue home meds     VTE ppx: heparin subcutaneous  Code Status : Patient is a full code    9/28 NEPHROLOGY CONSULT NOTE  Date of Consult:  9/28/2024  Reason for Consultation:   ESRD     History of  Present Illness:   Patient is a 79 year old male who was admitted to the hospital for Closed fracture of left hip, initial encounter (HCC):  Patient fell broke left hip history of ESRD dialysis Monday Wednesday and Friday had dialysis yesterday history of diabetes hypertension recent chronic GI bleeds atrial fibrillation coronary artery disease.  Has been in the hospital many times for blood transfusions due to bleeding has been on and off blood thinners  Currently on aspirin and Plavix feels okay but displaced fracture left femur         Recommendations:  1 ESRD labs okay dialysis Monday  #2 hip fracture possibly going for surgery tomorrow  #3 history of GI bleeding  Globin actually excellent 11.8  #4 diabetes  #5 hypertension keep pain under good control from hip    9/28 ORTHO NOTE    I spoke to Dr. Kline in the emergency room.  The patient has a minimally displaced left femoral neck fracture.  It is not an intertrochanteric fracture by the x-rays.  I ordered CT scan to see if this can be fixed or needs hemiarthroplasty.  Surgery is planned tomorrow.     Dr. Kline informed me that the patient is no longer on Plavix and that he recently had the Watchman procedure.  It is already 4 PM so I will use heparin subcu 5000 units once now since surgery is scheduled for 7:30 AM tomorrow and Sunday.  The exact procedure will be determined by the CT scan.               9/29 HOSPITALIST NOTE  On call [facility] 09/29/24  Rapid called for resp distress 66% on nc.  Tachypnea/hypertensive 201/66.  Was on cpap then had a significant emesis per nurse.  Currently he is working hard to breath, diminished bs bilateral with increased work of breathing.  ON NRB sats to 90%.  We quickly transferred to ICU placed on bipap, sats 95% work of breathing decreased.  We discussed intubation, patient reports would like to give bipap chance.  -suspect aspiration compounded by renal failure and markedly elevated bp  -iv hydralazine.  -stat  labs/cxr  -consult pulmonary.     9/29 PULMONARY CONSULT NOTE    Date of Admission: 9/28/2024 12:00 PM  Admission Diagnosis: Closed fracture of left hip, initial encounter ([facility]) [S72.002A]        Assessment/Plan:  Acute hypoxic respiratory failure - needing NIPPV for support  -CXR with findings consistent with diffuse infiltrates of flash pulm edema  -on BiPAP 16/10 - did better with higher PEEP  -needs HD /UF  -may need intubation  Flash pulm edema - very high BP after hip fracture.  Has ESRD on chronic dialysis  -needs emergent HD - being arranged  -doubt pneumonia - but on empiric Abx for now  ESRD -on HD - see above  -nephrology following  Hip fracture after fall  -will eventually need surgery when more stable medically  CAD / HTN - per cardiology  DM - per hospitalist  KRAIG - on chronic CPAP  FEN - npo for now while needing constant NIPPV  Dispo - full code  -sees [facility] as o/p for KRAIG     Discussed at length with patient / family.  Discussed with wife / dtrs and ICU team      History of Present Illness:   Ollie Hernández is a 79 year old with CAD, HTN, ESRD and KRAIG.  He unfortunately fell and broke his left hip.  He was being evaluated for surgery this morning when it was noted he had severe HTN and then difficulty breathing.  He has ESRD and is on chronic HD.  CXR showed diffuse infiltrates.  He was transferred to ICU in need of NIV         Recent Labs   Lab 09/29/24  0438   RBC 4.33   HGB 13.1   HCT 41.9   MCV 96.8   MCH 30.3   MCHC 31.3   RDW 15.8*   NEPRELIM 16.42*   WBC 17.9*   .0           Recent Labs   Lab 09/28/24  1204 09/29/24  0438   * 162*   BUN 59* 59*   CREATSERUM 4.85* 5.55*   CA 10.2 9.5   ALB 3.9  --     140   K 3.9 4.3    105   CO2 31.0 26.0   ALKPHO 75  --    AST 30  --    ALT 17  --    BILT 0.5  --    TP 7.2  --      9/29 ORTHO CONSULT NOTE        Chief Complaint   Patient presents with    Fall      HPI patient is a 79-year-old man admitted for left femoral  neck fracture.  He was scheduled to have surgery with me this morning but possibly aspirated and transferred to the PCU.  When I saw him in the PCU, he was slightly diaphoretic with a BiPAP machine in place.  He became sick and was projectile vomiting.  His wife was present and she provided some history.     Patient fell yesterday 9/28/2024 while putting on his shoes.  He was admitted through the [facility] emergency room.  X-rays and CT scan show a minimally displaced left femoral neck fracture.  The femoral head is slightly in varus and slightly posterior.  Mild degenerative changes symmetric to the right hip but good joint space remaining.     The patient has diabetes and poor kidney function.  His GFR is 17.       Impression:     Closed displaced midcervical fracture of left femur with delayed healing  I discussed with the wife that at some point we will do surgery when his medical conditions are more stable.  For now his medical conditions take precedence.  Continue nonweightbearing left leg.       ESRD (end stage renal disease) ([facility])  Per medicine and renal       Fracture of left hip due to osteoporosis with delayed healing  May not be able to order calcium due to his poor kidney function.  Will order multivitamin when more medically stable.            Recommendations:  Will fix his left hip fracture once he is more medically stable.  At this point we are planning to do a hemiarthroplasty but if he is not very stable, we could try stabilization with screws which has a lower morbidity.  Will monitor his medical condition to schedule surgery appropriately.    9/29 SIGNIFICANT EVENT PULMONARY NOTE  Called to bedside after patient noted to have small emesis in BIPAP mask by nursing who readily removed BIPAP mask and placed patient in high fowlers position.  He is not in acute respiratory distress, no coughing noted.  Replaced CPAP with nasal canula.  Initially hypoxic at 88% but has seemingly recovered to  94-95%.     D/w Dr Smiley, no ABG needed.  On empiric abx  Updated Dr Phipps     Family at bedside, updated and all questions answered.    1400- HD completed, pt appears to be tiring out.  Decision made w/pulm critical care to intubate.  Anesthesia called for assistance.  Intubated without difficulty- see procedural note  OG placed, will order CXR.   ABG in a few hours.     9/30 PULMONARY NOTE    Assessment / Plan:  Acute respiratory failure - due to pulmonary edema and/or aspiration. Intubated 9/29  - continue full vent support  - daily SBT  - minimize sedation  - empiric Zosyn; follow-up cultures  - volume management with HD  - CTA chest given elevated d-dimer  ESRD   - HD per nephrology  KRAIG  - on CPAP at home  Asthma  - Advair in lieu of Symbicort once extubated  Coffee ground output from OGT  - IV Protonix BID  Anemia - due to above  - transfuse to keep hgb >7  Hip fracture after fall  - per ortho  DM  - SSI  FEN  - NPO given concern for possible GIB  Ppx  - hold subcu heparin for now  - PPI  Dispo  - ICU      Subjective:  Febrile this morning. OGT with small amount of coffee ground emesis.      Objective:  Vitals          Vitals:     09/30/24 0600 09/30/24 0700 09/30/24 0730 09/30/24 0813   BP:           BP Location:           Pulse: 77 75 82 76   Resp: 22 15 19 11   Temp:     (!) 101.4 °F (38.6 °C) 99.7 °F (37.6 °C)   TempSrc:     Temporal Esophageal   SpO2: 100% 100% 100% 100%        9/30 ORTHO NOTE    Subjective:  Subjective:  Currently intubated and sedated.  Daughter at bedside.  Patient is currently getting dialysis. Had coffee ground emesis earlier.            Objective:  Blood pressure (!) 65/57, pulse 78, temperature 99.7 °F (37.6 °C), resp. rate 14, weight 164 lb 0.4 oz (74.4 kg), SpO2 100%.  Physical Exam  Unable to assess left hip, due to patient being intubated.  Lower extremity skin healthy, no pitting edema.           Results:        Lab Results   Component Value Date     WBC 11.2 (H)  09/30/2024     HGB 9.9 (L) 09/30/2024     HCT 28.5 (L) 09/30/2024     .0 (L) 09/30/2024     CREATSERUM 4.88 (H) 09/30/2024     BUN 55 (H) 09/30/2024      09/30/2024     K 3.5 09/30/2024     CL 98 09/30/2024     CO2 29.0 09/30/2024      (H) 09/30/2024     CA 9.9 09/30/2024     Assessment & Plan:  Closed displaced midcervical fracture of left femur with delayed healing  I discussed with the daughter that the plan remains to do surgery on his medical missions are more stable.  For now his medical conditions take precedence.  He will continue to be nonweightbearing on his left leg.       ESRD (end stage renal disease) (Formerly [facility])  Per medicine and renal       Closed fracture of left hip (HCC)  As above

## 2024-10-01 NOTE — PROGRESS NOTES
[facility]  part of [facility]    Progress Note    Ollie Hernández Patient Status:  Inpatient    1945 MRN [id number]   Location [facility] 2W/SW Attending Darlin Phipps MD   Hosp Day # 3 PCP Wili Parmar MD     Chief Complaint:     Hip fracture left    Subjective:   Patient is very awake and alert with the vent. Is very agitated. Cannot make out what he is trying to say. Family at bedside. Discussed with nursing. Will be increasing propofol.     Objective:   Blood pressure 106/50, pulse 83, temperature 98.5 °F (36.9 °C), temperature source Temporal, resp. rate 17, weight 160 lb 0.9 oz (72.6 kg), SpO2 100%.      General: Patient is alert and oriented, tracking with eyes  Cardiac: S1-S2 appreciated  Lungs: mechanical breath sounds  Abdomen: Soft nontender nondistended positive bowel sounds  Ext: Peripheral pulses are positive  Neuro: No focal deficits noted  Psych: Normal mood  Skin: No rashes noted  MSK: Full range of motion intact      Results:   Lab Results   Component Value Date    WBC 10.7 10/01/2024    HGB 10.5 (L) 10/01/2024    HCT 31.8 (L) 10/01/2024    .0 (L) 10/01/2024    CREATSERUM 3.98 (H) 10/01/2024    BUN 36 (H) 10/01/2024     10/01/2024    K 3.5 10/01/2024    CL 99 10/01/2024    CO2 29.0 10/01/2024     (H) 10/01/2024    CA 9.8 10/01/2024    ALB 3.9 10/01/2024    ALKPHO 63 10/01/2024    BILT 1.2 (H) 10/01/2024    TP 7.0 10/01/2024    AST 22 10/01/2024    ALT 9 (L) 10/01/2024    PTT 30.1 2024    INR 1.45 (H) 2024    T4F 0.9 2022    TSH 2.844 2024     (H) 2024    PSA 2.94 10/20/2021    DDIMER 6.07 (H) 2024    ESRML 10 2024    CRP 1.30 (H) 2024    MG 1.8 2024    PHOS 5.6 (H) 2024    TROP <0.045 2019    TROPHS 56 (HH) 2024     2023    B12 672 2024       US VENOUS DOPPLER LEG BILAT - DIAG IMG (CPT=93970)    Result Date: 10/1/2024  CONCLUSION:   Negative for sonographic evidence of deep venous thrombosis in either lower extremity.    Dictated by (CST): Yung Rios MD on 10/01/2024 at 6:51 AM     Finalized by (CST): Yung Rios MD on 10/01/2024 at 6:51 AM          CT CHEST PE AORTA (IV ONLY) (CPT=71260)    Result Date: 9/30/2024  CONCLUSION:  1. No evidence of acute pulmonary embolism to the level of the proximal segmental pulmonary artery branches.  2. Bilateral pleural effusions and associated basilar atelectasis, with or without superimposed pneumonia.  3. Mild pulmonary interstitial edema is observed.  4. Dilatation of the main pulmonary artery trunk may relate to underlying pulmonary hypertension.  5. Mild ascending thoracic aortic ectasia.  6. Mild centrilobular emphysema.  7. Additional support tubes and lines as above.  8. A 1.9 cm right thyroid lobe nodule is present. If not previously worked up, follow-up nonemergent thyroid sonography can be considered for further assessment.  9. Lesser incidental findings as above.      Dictated by (CST): Yung Rios MD on 9/30/2024 at 11:44 AM     Finalized by (CST): Yung Rios MD on 9/30/2024 at 11:54 AM          XR CHEST AP PORTABLE  (CPT=71045)    Result Date: 9/29/2024  CONCLUSION:   Interval intubation with the endotracheal tube tip approximately 3.4 cm of the jono.  Enteric tube terminates in the proximal gastric body.  No other significant interval change.    Dictated by (CST): Kevin Hensley MD on 9/29/2024 at 3:47 PM     Finalized by (CST): Kevin Hensley MD on 9/29/2024 at 3:49 PM               Assessment & Plan:      Acute respiratory failure with hypoxia - Possibly in the setting of aspiration  -Patient intubated during hospital stay- now awake   - will need to place back on propofol for sedation  - lidocaine patches ordered for back pain   -CTA chest reviewed  -Pulm on consult   -continue IV abx, vent support, volume management with HD  -monitor vitals    Minimally displaced  left femoral neck fracture  -Appreciate orthopedic surgery recommendations - surgical intervention once medically stable  -Patient recently had a Watchman procedure done so pain he is currently not on Plavix  -Will currently hold aspirin as well  -Will continue to monitor patient closely  -Continue analgesics and antiemetics as needed     Accelerated hypertension  Recent Watchman procedure  -Resume patient's oral antihypertensives  -Start the patient on IV hypertensives with SBP greater than 165  -Continue to monitor on telemetry  -Titrate meds as needed  -Hydralazine/Labetalol PRN  -appreciate cardiology recs     Diabetes  -Start the patient on Accu-Cheks before every meal and at bedtime  -Hold oral hypoglycemic agents  -Supplement insulin sliding scale     ESRD on HD  -Nephrology placed on consult  -Dialysis per nephrology  -HD post CTA chest pending     History of CAD  -Continue home medications     HL  -Continue home meds     VTE ppx: heparin subcutaneous  Code Status : Patient is a full code       -MDM: High, severe exacerbation of chronic illness posing a threat to life. IV medications requiring close inpatient monitoring.     MDM .high    Ne Irizarry MD

## 2024-10-01 NOTE — PLAN OF CARE
Problem: Patient Centered Care  Goal: Patient preferences are identified and integrated in the patient's plan of care  Description: Interventions:  - What would you like us to know as we care for you? From home  - Provide timely, complete, and accurate information to patient/family  - Incorporate patient and family knowledge, values, beliefs, and cultural backgrounds into the planning and delivery of care  - Encourage patient/family to participate in care and decision-making at the level they choose  - Honor patient and family perspectives and choices  Outcome: Progressing     Problem: Diabetes/Glucose Control  Goal: Glucose maintained within prescribed range  Description: INTERVENTIONS:  - Monitor Blood Glucose as ordered  - Assess for signs and symptoms of hyperglycemia and hypoglycemia  - Administer ordered medications to maintain glucose within target range  - Assess barriers to adequate nutritional intake and initiate nutrition consult as needed  - Instruct patient on self management of diabetes  Outcome: Progressing     Problem: Patient/Family Goals  Goal: Patient/Family Long Term Goal  Description: Patient's Long Term Goal: maintain general health    Interventions:  - medication, PT/OT, diet and lifestyle modification  - See additional Care Plan goals for specific interventions  Outcome: Progressing  Goal: Patient/Family Short Term Goal  Description: Patient's Short Term Goal: go home    Interventions:   - medication, labs and diagnostics  - See additional Care Plan goals for specific interventions  Outcome: Progressing     Problem: PAIN - ADULT  Goal: Verbalizes/displays adequate comfort level or patient's stated pain goal  Description: INTERVENTIONS:  - Encourage pt to monitor pain and request assistance  - Assess pain using appropriate pain scale  - Administer analgesics based on type and severity of pain and evaluate response  - Implement non-pharmacological measures as appropriate and evaluate  response  - Consider cultural and social influences on pain and pain management  - Manage/alleviate anxiety  - Utilize distraction and/or relaxation techniques  - Monitor for opioid side effects  - Notify MD/LIP if interventions unsuccessful or patient reports new pain  - Anticipate increased pain with activity and pre-medicate as appropriate  Outcome: Progressing     Problem: SAFETY ADULT - FALL  Goal: Free from fall injury  Description: INTERVENTIONS:  - Assess pt frequently for physical needs  - Identify cognitive and physical deficits and behaviors that affect risk of falls.  - [place] fall precautions as indicated by assessment.  - Educate pt/family on patient safety including physical limitations  - Instruct pt to   Problem: DISCHARGE PLANNING  Goal: Discharge to home or other facility with appropriate resources  Description: INTERVENTIONS:  - Identify barriers to discharge w/pt and caregiver  - Include patient/family/discharge partner in discharge planning  - Arrange for needed discharge resources and transportation as appropriate  - Identify discharge learning needs (meds, wound care, etc)  - Arrange for interpreters to assist at discharge as needed  - Consider post-discharge preferences of patient/family/discharge partner  - Complete POLST form as appropriate  - Assess patient's ability to be responsible for managing their own health  - Refer to Case Management Department for coordinating discharge planning if the patient needs post-hospital services based on physician/LIP order or complex needs related to functional status, cognitive ability or social support system  Outcome: Progressing     Problem: Safety Risk - Non-Violent Restraints  Goal: Patient will remain free from self-harm  Description: INTERVENTIONS:  - Apply the least restrictive restraint to prevent harm  - Notify patient and family of reasons restraints applied  - Assess for any contributing factors to confusion (electrolyte disturbances,  delirium, medications)  - Discontinue any unnecessary medical devices as soon as possible  - Assess the patient's physical comfort, circulation, skin condition, hydration, nutrition and elimination needs   - Reorient and redirection as needed  - Assess for the need to continue restraints  10/1/2024 0700 by Stuart Alvarez, RN  Outcome: Progressing  9/30/2024 2327 by Stuart Alvarez, RN  Outcome: Progressing     Problem: CARDIOVASCULAR - ADULT  Goal: Maintains optimal cardiac output and hemodynamic stability  Description: INTERVENTIONS:  - Monitor vital signs, rhythm, and trends  - Monitor for bleeding, hypotension and signs of decreased cardiac output  - Evaluate effectiveness of vasoactive medications to optimize hemodynamic stability  - Monitor arterial and/or venous puncture sites for bleeding and/or hematoma  - Assess quality of pulses, skin color and temperature  - Assess for signs of decreased coronary artery perfusion - ex. Angina  - Evaluate fluid balance, assess for edema, trend weights  Outcome: Progressing  Goal: Absence of cardiac arrhythmias or at baseline  Description: INTERVENTIONS:  - Continuous cardiac monitoring, monitor vital signs, obtain 12 lead EKG if indicated  - Evaluate effectiveness of antiarrhythmic and heart rate control medications as ordered  - Initiate emergency measures for life threatening arrhythmias  - Monitor electrolytes and administer replacement therapy as ordered  Outcome: Progressing     Problem: RESPIRATORY - ADULT  Goal: Achieves optimal ventilation and oxygenation  Description: INTERVENTIONS:  - Assess for changes in respiratory status  - Assess for changes in mentation and behavior  - Position to facilitate oxygenation and minimize respiratory effort  - Oxygen supplementation based on oxygen saturation or ABGs  - Provide Smoking Cessation handout, if applicable  - Encourage broncho-pulmonary hygiene including cough, deep breathe, Incentive Spirometry  - Assess the need for  suctioning and perform as needed  - Assess and instruct to report SOB or any respiratory difficulty  - Respiratory Therapy support as indicated  - Manage/alleviate anxiety  - Monitor for signs/symptoms of CO2 retention  Outcome: Progressing

## 2024-10-01 NOTE — RESPIRATORY THERAPY NOTE
Pt received on vent with settings of AC/VC 14/500/+5/30%. Bilateral BS auscultated. Suction was proved as needed. ETT was rotated during shift. No vent changes were made during night. RT to continue monitor.

## 2024-10-01 NOTE — RESPIRATORY THERAPY NOTE
Patient received on vent:       10/01/24 1040   Vent Information   Vent Mode VC/AC   Settings   FiO2 (%) 30 %   Resp Rate (Set) 14   Vt (Set, mL) 500 mL   Waveform Decelerating ramp   PEEP/CPAP (cm H2O) 5 cm H20     Bilateral BS auscultated, suction provided as needed. Sbt performed for 35 mins. Pt got anxious, HR and RR went up so returned to full support. MD and RN aware

## 2024-10-01 NOTE — PLAN OF CARE
Fentanyl drip increased to 50 mcg/hr.  Patient remains in bilateral wrist restraints.  Wife Stephy at bedside and educated on the need for restraints to maintain patient safety.  Stephy expressed understanding.  No evidence of injury to wrists.  CTA Chest negative for Pulm. Embolus.  Heparin held d/t coffee ground emesis.  Consideration for tube feeding trial tomorrow.  Pulse changes to bilateral lower extremities, previously palpable and now doppler only.  MD's notified.  BLLE dopplers ordered.  Dialysis in progress at bedside. Encorsed to oncoming RN to call US to come when dialysis is finished.  Plan for sedation vacation and breathing trial tomorrow morning.  FiO2 weaned to 30%. Tmax 100.4.  Esophageal probe placed.  Tylenol x2.  Blood cultures repeated.  Sputum culture sent.  NG remains to LIS, clamped x1hr for po meds.  Hydralazine prn for SBP >160.  Cardiology consulted.      Problem: PAIN - ADULT  Goal: Verbalizes/displays adequate comfort level or patient's stated pain goal  Description: INTERVENTIONS:  - Encourage pt to monitor pain and request assistance  - Assess pain using appropriate pain scale  - Administer analgesics based on type and severity of pain and evaluate response  - Implement non-pharmacological measures as appropriate and evaluate response  - Consider cultural and social influences on pain and pain management  - Manage/alleviate anxiety  - Utilize distraction and/or relaxation techniques  - Monitor for opioid side effects  - Notify MD/LIP if interventions unsuccessful or patient reports new pain  - Anticipate increased pain with activity and pre-medicate as appropriate  9/30/2024 2011 by Monica Fuentes, RN  Outcome: Progressing  9/30/2024 2004 by Monica Fuentes, RN  Outcome: Progressing     Problem: SAFETY ADULT - FALL  Goal: Free from fall injury  Description: INTERVENTIONS:  - Assess pt frequently for physical needs  - Identify cognitive and physical deficits and behaviors that affect risk  of falls.  - [place] fall precautions as indicated by assessment.  - Educate pt/family on patient safety including physical limitations  - Instruct pt to call for assistance with activity based on assessment  - Modify environment to reduce risk of injury  - Provide assistive devices as appropriate  - Consider OT/PT consult to assist with strengthening/mobility  - Encourage toileting schedule  9/30/2024 2011 by Monica Fuentes RN  Outcome: Progressing  9/30/2024 2004 by Monica Fuentes RN  Outcome: Progressing     Problem: Safety Risk - Non-Violent Restraints  Goal: Patient will remain free from self-harm  Description: INTERVENTIONS:  - Apply the least restrictive restraint to prevent harm  - Notify patient and family of reasons restraints applied  - Assess for any contributing factors to confusion (electrolyte disturbances, delirium, medications)  - Discontinue any unnecessary medical devices as soon as possible  - Assess the patient's physical comfort, circulation, skin condition, hydration, nutrition and elimination needs   - Reorient and redirection as needed  - Assess for the need to continue restraints  9/30/2024 2011 by Monica Fuentes RN  Outcome: Progressing  9/30/2024 2004 by Monica Fuentes RN  Outcome: Progressing     Problem: CARDIOVASCULAR - ADULT  Goal: Maintains optimal cardiac output and hemodynamic stability  Description: INTERVENTIONS:  - Monitor vital signs, rhythm, and trends  - Monitor for bleeding, hypotension and signs of decreased cardiac output  - Evaluate effectiveness of vasoactive medications to optimize hemodynamic stability  - Monitor arterial and/or venous puncture sites for bleeding and/or hematoma  - Assess quality of pulses, skin color and temperature  - Assess for signs of decreased coronary artery perfusion - ex. Angina  - Evaluate fluid balance, assess for edema, trend weights  9/30/2024 2011 by Monica Fuentes RN  Outcome: Progressing  9/30/2024 2004 by Monica Fuentes RN  Outcome: Progressing  Goal:  Absence of cardiac arrhythmias or at baseline  Description: INTERVENTIONS:  - Continuous cardiac monitoring, monitor vital signs, obtain 12 lead EKG if indicated  - Evaluate effectiveness of antiarrhythmic and heart rate control medications as ordered  - Initiate emergency measures for life threatening arrhythmias  - Monitor electrolytes and administer replacement therapy as ordered  9/30/2024 2011 by Monica Fuentes RN  Outcome: Progressing  9/30/2024 2004 by Monica Fuentes RN  Outcome: Progressing     Problem: RESPIRATORY - ADULT  Goal: Achieves optimal ventilation and oxygenation  Description: INTERVENTIONS:  - Assess for changes in respiratory status  - Assess for changes in mentation and behavior  - Position to facilitate oxygenation and minimize respiratory effort  - Oxygen supplementation based on oxygen saturation or ABGs  - Provide Smoking Cessation handout, if applicable  - Encourage broncho-pulmonary hygiene including cough, deep breathe, Incentive Spirometry  - Assess the need for suctioning and perform as needed  - Assess and instruct to report SOB or any respiratory difficulty  - Respiratory Therapy support as indicated  - Manage/alleviate anxiety  - Monitor for signs/symptoms of CO2 retention  9/30/2024 2011 by Monica Fuentes RN  Outcome: Progressing  9/30/2024 2004 by Monica Fuentes RN  Outcome: Progressing

## 2024-10-01 NOTE — TELEPHONE ENCOUNTER
[facility]  She was supposed to see the pt. Today for a home visit but was informed by the wife that the pt. In currently admitted  into [facility].

## 2024-10-01 NOTE — PROGRESS NOTES
Critical Care Progress Note     Assessment / Plan:  Acute respiratory failure - due to pulmonary edema and/or aspiration. Intubated 9/29  - continue full vent support  - daily SBT  - minimize sedation  - empiric Zosyn; follow-up cultures  - volume management with HD  ESRD   - HD per nephrology  KRAIG  - on CPAP at home  Asthma  - Advair in lieu of Symbicort once extubated  Coffee ground output from OGT - resolved  - IV Protonix BID  Anemia - due to above  - transfuse to keep hgb >7  Hip fracture after fall  - per ortho  DM  - SSI  FEN  - NPO for now  Ppx  - subcu heparin  - PPI  Dispo  - ICU    Discussed with family at bedside    Critical care time: 40 minutes      Subjective:  No events overnight    Objective:  Vitals:    10/01/24 1122 10/01/24 1125 10/01/24 1200 10/01/24 1215   BP: 106/50      BP Location: Right arm      Pulse: 82 81 80 83   Resp: 14 14 14 17   Temp:   98.5 °F (36.9 °C)    TempSrc:   Temporal    SpO2: 100% 100% 100% 100%   Weight:         Physical Exam:  General: intubated  Skin: no rash, ulcers or subcutaneous nodules  Eyes: anicteric sclerae, moist conjunctivae  Head, ears, nose, throat: atraumatic, oropharynx clear with moist mucous membranes  Neck: trachea midline with no thyromegaly  Heart: regular rate and rhythm, no murmurs / rubs / gallops  Lungs: bibasilar rhonchi  Abdomen: soft, nontender, nondistended   Extremities: no edema or cyanosis  Psych: opens eyes to voice    Medications:  Reviewed in EMR    Lab Data:  Reviewed in EMR    Imaging:  I independently visualized all relevant chest imaging in PACS and agree with radiology interpretation except where noted.

## 2024-10-02 ENCOUNTER — APPOINTMENT (OUTPATIENT)
Dept: GENERAL RADIOLOGY | Facility: HOSPITAL | Age: 79
End: 2024-10-02
Attending: INTERNAL MEDICINE
Payer: MEDICARE

## 2024-10-02 ENCOUNTER — TELEPHONE (OUTPATIENT)
Dept: ORTHOPEDICS CLINIC | Facility: CLINIC | Age: 79
End: 2024-10-02

## 2024-10-02 LAB
ANION GAP SERPL CALC-SCNC: 15 MMOL/L (ref 0–18)
BUN BLD-MCNC: 61 MG/DL (ref 9–23)
BUN/CREAT SERPL: 10.4 (ref 10–20)
CALCIUM BLD-MCNC: 9.9 MG/DL (ref 8.7–10.4)
CHLORIDE SERPL-SCNC: 98 MMOL/L (ref 98–112)
CO2 SERPL-SCNC: 24 MMOL/L (ref 21–32)
CREAT BLD-MCNC: 5.88 MG/DL
DEPRECATED RDW RBC AUTO: 54.1 FL (ref 35.1–46.3)
EGFRCR SERPLBLD CKD-EPI 2021: 9 ML/MIN/1.73M2 (ref 60–?)
ERYTHROCYTE [DISTWIDTH] IN BLOOD BY AUTOMATED COUNT: 16.4 % (ref 11–15)
GLUCOSE BLD-MCNC: 140 MG/DL (ref 70–99)
GLUCOSE BLDC GLUCOMTR-MCNC: 129 MG/DL (ref 70–99)
GLUCOSE BLDC GLUCOMTR-MCNC: 152 MG/DL (ref 70–99)
GLUCOSE BLDC GLUCOMTR-MCNC: 162 MG/DL (ref 70–99)
GLUCOSE BLDC GLUCOMTR-MCNC: 194 MG/DL (ref 70–99)
GLUCOSE BLDC GLUCOMTR-MCNC: 221 MG/DL (ref 70–99)
HCT VFR BLD AUTO: 27.5 %
HGB BLD-MCNC: 9.3 G/DL
MAGNESIUM SERPL-MCNC: 2.2 MG/DL (ref 1.6–2.6)
MCH RBC QN AUTO: 30.4 PG (ref 26–34)
MCHC RBC AUTO-ENTMCNC: 33.8 G/DL (ref 31–37)
MCV RBC AUTO: 89.9 FL
OSMOLALITY SERPL CALC.SUM OF ELEC: 304 MOSM/KG (ref 275–295)
PLATELET # BLD AUTO: 159 10(3)UL (ref 150–450)
POTASSIUM SERPL-SCNC: 3.4 MMOL/L (ref 3.5–5.1)
RBC # BLD AUTO: 3.06 X10(6)UL
SODIUM SERPL-SCNC: 137 MMOL/L (ref 136–145)
TRIGL SERPL-MCNC: 206 MG/DL (ref 30–149)
WBC # BLD AUTO: 8.1 X10(3) UL (ref 4–11)

## 2024-10-02 PROCEDURE — 99232 SBSQ HOSP IP/OBS MODERATE 35: CPT | Performed by: INTERNAL MEDICINE

## 2024-10-02 PROCEDURE — 99233 SBSQ HOSP IP/OBS HIGH 50: CPT | Performed by: HOSPITALIST

## 2024-10-02 PROCEDURE — 3078F DIAST BP <80 MM HG: CPT | Performed by: HOSPITALIST

## 2024-10-02 PROCEDURE — 71045 X-RAY EXAM CHEST 1 VIEW: CPT | Performed by: INTERNAL MEDICINE

## 2024-10-02 PROCEDURE — 3008F BODY MASS INDEX DOCD: CPT | Performed by: HOSPITALIST

## 2024-10-02 PROCEDURE — 3077F SYST BP >= 140 MM HG: CPT | Performed by: HOSPITALIST

## 2024-10-02 PROCEDURE — 1159F MED LIST DOCD IN RCRD: CPT | Performed by: HOSPITALIST

## 2024-10-02 RX ORDER — TRANEXAMIC ACID 10 MG/ML
1000 INJECTION, SOLUTION INTRAVENOUS
Status: COMPLETED | OUTPATIENT
Start: 2024-10-04 | End: 2024-10-04

## 2024-10-02 RX ORDER — SENNOSIDES 8.8 MG/5ML
10 LIQUID ORAL 2 TIMES DAILY
Status: DISCONTINUED | OUTPATIENT
Start: 2024-10-02 | End: 2024-10-19

## 2024-10-02 RX ORDER — MINERAL OIL AND PETROLATUM 150; 830 MG/G; MG/G
1 OINTMENT OPHTHALMIC NIGHTLY
Status: DISCONTINUED | OUTPATIENT
Start: 2024-10-02 | End: 2024-10-22

## 2024-10-02 RX ORDER — POTASSIUM CHLORIDE 1.5 G/1.58G
20 POWDER, FOR SOLUTION ORAL ONCE
Status: COMPLETED | OUTPATIENT
Start: 2024-10-02 | End: 2024-10-02

## 2024-10-02 NOTE — PLAN OF CARE
Switched sedation from propofol to precedex, had to restart norepinephrine MAP below 60. Patient able to follow commands, RASS at -1. Will continue to monitor.  Problem: CARDIOVASCULAR - ADULT  Goal: Maintains optimal cardiac output and hemodynamic stability  Description: INTERVENTIONS:  - Monitor vital signs, rhythm, and trends  - Monitor for bleeding, hypotension and signs of decreased cardiac output  - Evaluate effectiveness of vasoactive medications to optimize hemodynamic stability  - Monitor arterial and/or venous puncture sites for bleeding and/or hematoma  - Assess quality of pulses, skin color and temperature  - Assess for signs of decreased coronary artery perfusion - ex. Angina  - Evaluate fluid balance, assess for edema, trend weights  Outcome: Progressing  Goal: Absence of cardiac arrhythmias or at baseline  Description: INTERVENTIONS:  - Continuous cardiac monitoring, monitor vital signs, obtain 12 lead EKG if indicated  - Evaluate effectiveness of antiarrhythmic and heart rate control medications as ordered  - Initiate emergency measures for life threatening arrhythmias  - Monitor electrolytes and administer replacement therapy as ordered  Outcome: Progressing

## 2024-10-02 NOTE — PROGRESS NOTES
Critical Care Progress Note     Assessment / Plan:  Acute respiratory failure - due to pulmonary edema and/or aspiration. Intubated 9/29. CXR with interval improvement  - continue full vent support  - daily SBT  - minimize sedation as able  - empiric Zosyn  - volume management with HD  ESRD   - HD per nephrology  KRAIG  - on CPAP at home  Asthma  - Advair in lieu of Symbicort once extubated  Coffee ground output from OGT - resolved  - IV Protonix BID  Anemia - due to above  - transfuse to keep hgb >7  Hip fracture after fall  - patient is high risk from pulmonary perspective for surgery  - per ortho  DM  - SSI  FEN  - TFs  Ppx  - subcu heparin  - PPI  Dispo  - ICU    Critical care time: 40 minutes      Subjective:  No events overnight    Objective:  Vitals:    10/02/24 1000 10/02/24 1100 10/02/24 1140 10/02/24 1200   BP:       BP Location:       Pulse: 77 79 80 81   Resp: 14 12 14 17   Temp:    97.3 °F (36.3 °C)   TempSrc:    Temporal   SpO2: 100% 100% 100% 100%   Weight:         Physical Exam:  General: intubated  Skin: no rash, ulcers or subcutaneous nodules  Eyes: anicteric sclerae, moist conjunctivae  Head, ears, nose, throat: atraumatic, oropharynx clear with moist mucous membranes  Neck: trachea midline with no thyromegaly  Heart: regular rate and rhythm, no murmurs / rubs / gallops  Lungs: clear bilaterally  Abdomen: soft, nontender, nondistended   Extremities: no edema or cyanosis  Psych: sedated    Medications:  Reviewed in EMR    Lab Data:  Reviewed in EMR    Imaging:  I independently visualized all relevant chest imaging in PACS and agree with radiology interpretation except where noted.

## 2024-10-02 NOTE — PROGRESS NOTES
[facility]  part of [facility]    Progress Note    Ollie Hernández Patient Status:  Inpatient    1945 MRN [id number]   Location [facility] 2W/SW Attending Ne Irizarry MD   Hosp Day # 3 PCP Wili Parmar MD       Subjective:     Constitutional:         Patient seen with nurse.  Wife and family member also visiting.  He was sleeping initially but the nurse woke him up as his blood pressure was a little low.  Reportedly there was a breathing trial today but he remains intubated currently.  Overall he is doing a little better.  Blood pressure reportedly labile.       Objective:   Blood pressure 106/50, pulse 82, temperature 98.5 °F (36.9 °C), temperature source Temporal, resp. rate 14, weight 160 lb 0.9 oz (72.6 kg), SpO2 100%.  Physical Exam  Musculoskeletal:      Comments: Exam limited due to medical conditions.  Left leg length and rotation equal to right.  No calf tenderness currently.         Results:   Lab Results   Component Value Date    WBC 10.7 10/01/2024    HGB 10.5 (L) 10/01/2024    HCT 31.8 (L) 10/01/2024    .0 (L) 10/01/2024    CREATSERUM 3.98 (H) 10/01/2024    BUN 36 (H) 10/01/2024     10/01/2024    K 3.5 10/01/2024    CL 99 10/01/2024    CO2 29.0 10/01/2024     (H) 10/01/2024    CA 9.8 10/01/2024    ALB 3.9 10/01/2024    ALKPHO 63 10/01/2024    BILT 1.2 (H) 10/01/2024    TP 7.0 10/01/2024    AST 22 10/01/2024    ALT 9 (L) 10/01/2024    PTT 30.1 2024    INR 1.45 (H) 2024    T4F 0.9 2022    TSH 2.844 2024     (H) 2024    PSA 2.94 10/20/2021    DDIMER 6.07 (H) 2024    ESRML 10 2024    CRP 1.30 (H) 2024    MG 1.8 2024    PHOS 5.6 (H) 2024    TROP <0.045 2019    TROPHS 56 (HH) 2024     2023    B12 672 2024       US VENOUS DOPPLER LEG BILAT - DIAG IMG (CPT=93970)    Result Date: 10/1/2024  CONCLUSION:  Negative for sonographic evidence of deep venous  thrombosis in either lower extremity.    Dictated by (CST): Yung Rios MD on 10/01/2024 at 6:51 AM     Finalized by (CST): Yung Rios MD on 10/01/2024 at 6:51 AM          CT CHEST PE AORTA (IV ONLY) (CPT=71260)    Result Date: 9/30/2024  CONCLUSION:  1. No evidence of acute pulmonary embolism to the level of the proximal segmental pulmonary artery branches.  2. Bilateral pleural effusions and associated basilar atelectasis, with or without superimposed pneumonia.  3. Mild pulmonary interstitial edema is observed.  4. Dilatation of the main pulmonary artery trunk may relate to underlying pulmonary hypertension.  5. Mild ascending thoracic aortic ectasia.  6. Mild centrilobular emphysema.  7. Additional support tubes and lines as above.  8. A 1.9 cm right thyroid lobe nodule is present. If not previously worked up, follow-up nonemergent thyroid sonography can be considered for further assessment.  9. Lesser incidental findings as above.      Dictated by (CST): Yung Rios MD on 9/30/2024 at 11:44 AM     Finalized by (CST): Yung Rios MD on 9/30/2024 at 11:54 AM               Assessment & Plan:     Closed displaced midcervical fracture of left femur with delayed healing  I discussed with the family and nurse that we are shooting for Thursday surgery as a possibility.  I told the family that I might simply pin this if his medical conditions are labile as this is a much lower morbidity surgery.  I did tell him that with pinning however, there is a chance that it could displace or fall apart.  Ideally we can do a hemiarthroplasty but he may not be able to tolerate that large of a surgery currently.  They asked questions and I answered them.  The nursing is going to discuss with cardiology now also try to call them tomorrow to see what their thoughts are with regard to his ability to go through surgery.      ESRD (end stage renal disease) (HCC)  Per medicine      Closed fracture of left hip (HCC)  As  above.              Humberto Murphy MD  10/1/2024

## 2024-10-02 NOTE — PROGRESS NOTES
[facility]  part of [facility]    Cardiology Progress Note    Ollie Hernández Patient Status:  Inpatient    1945 MRN [id number]   Location [facility] 2W/SW Attending Pranay Michel MD   Hosp Day # 4 PCP Wili Parmar MD       Impression/Plan:  79 year old male presenting with:    Hip fracture  Acute resp failure, aspiration and/or pulmonary edema; intubated 2024  ESRD on HD  DM  PAF, currently SR. S/p watchman device 24  HTN  HL, on statin PTA  Acute on chronic diastolic CHF  CAD s/p [place] circ 24 ]  Coffee grounds in OG tube  Elevated troponin, likely demand ischemia    - Cont carvedilol for rate control  - Cont statin  - Asa/plavix on hold (recent Watchman implant 2024); resume as safely able.  If prolonged hold anticipated consider heparin gtt  - Patient inherently high risk for adverse angelica-op events given acute resp failure, CAD and AF; however, currently rate controlled, TERRI 2024 with normal LV function and s/p revasc 2024.  No further cardiac testing needed prior to planned ortho procedures  - HD as per nephrology  - Vent management as per pulm  - Monitor on tele  - Will follow     Subjective: No events overnight.  Today patient remains intubated and sedated, on HD.  No family at the bedside.    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)    Atherosclerosis of native arteries of extremities with intermittent claudication, bilateral legs  (HCC)    Primary hypertension    Smokers' cough (HCC)    Unstable angina (HCC)    Lower GI bleed    ESRD (end stage renal disease) on dialysis (HCC)    PAF (paroxysmal atrial fibrillation) (HCC)    AVM (arteriovenous malformation) of colon    ABLA (acute blood loss anemia)    Rectal bleeding    Anticoagulated    Antiplatelet or antithrombotic long-term use    Lower GI bleeding    ESRD on hemodialysis (HCC)    GI bleed    Closed displaced midcervical fracture of left femur with delayed healing    ESRD (end stage renal disease) (HCC)    Closed fracture of left hip (HCC)       Objective:   Temp: 97.1 °F (36.2 °C)  Pulse: 68  Resp: 16  BP: 106/50  AO: 112/35  FiO2 (%): 30 %    Intake/Output:     Intake/Output Summary (Last 24 hours) at 10/2/2024 0858  Last data filed at 10/2/2024 0716  Gross per 24 hour   Intake 1047.42 ml   Output 220 ml   Net 827.42 ml       Last 3 Weights   10/02/24 0800 143 lb 11.8 oz (65.2 kg)   10/01/24 0600 160 lb 0.9 oz (72.6 kg)   09/30/24 0400 164 lb 0.4 oz (74.4 kg)   09/28/24 1649 155 lb 11.2 oz (70.6 kg)   08/22/24 1311 159 lb 12.8 oz (72.5 kg)   08/20/24 0933 158 lb (71.7 kg)       Tele: NSR    Physical Exam:    General: Intubated, sedated  HEENT: Normocephalic, anicteric sclera, neck supple, no thyromegaly or adenopathy.  Neck: No JVD, carotids 2+, no bruits.  Cardiac: Regular rate and rhythm. S1, S2 normal. No murmur, pericardial rub, S3, thrill, heave or extra cardiac sounds.  Lungs: Coarse breath sounds bilat  Abdomen: Soft, non-tender. No organosplenomegally, mass or rebound, BS-present.  Extremities: Without clubbing, cyanosis or edema.  Peripheral pulses are 2+.  Neurologic: Intubated, sedated  Skin: Warm and dry.     Laboratory/Data:    Labs:         Recent Labs   Lab 09/28/24  1204 09/29/24  0438 09/30/24  0427 09/30/24  1620 10/01/24  0330 10/02/24  0450   WBC 9.1 17.9* 11.2*  --  10.7 8.1   HGB 11.8* 13.1 10.0* 9.9* 10.5* 9.3*   MCV 92.9 96.8 93.2  --  93.5 89.9   .0  188.0 141.0*  --  148.0* 159.0       Recent Labs   Lab 09/29/24  0438 09/29/24  0755 09/30/24  0427 10/01/24  0330 10/02/24  0437    140 139 139 137   K 4.3 4.4 3.5 3.5 3.4*    104 98 99 98   CO2 26.0 25.0 29.0 29.0 24.0   BUN 59* 62* 55* 36* 61*   CREATSERUM 5.55* 5.38* 4.88* 3.98* 5.88*   CA 9.5 9.5 9.9 9.8 9.9   MG 1.8  --  1.8  --   --    PHOS 6.4*  --  5.6*  --   --    * 176* 117* 135* 140*       Recent Labs   Lab 09/28/24  1204 09/29/24 0755 10/01/24  0330   ALT 17 13 9*   AST 30 23 22   ALB 3.9 3.7 3.9       No results for input(s): \"TROP\" in the last 168 hours.    Allergies:   Allergies   Allergen Reactions    Adhesive Tape OTHER (SEE COMMENTS)     Severe rashes    Dust Mite Extract RASH       Medications:  Current Facility-Administered Medications   Medication Dose Route Frequency    norepinephrine (Levophed) 4 mg/250mL infusion premix  0.5-30 mcg/min Intravenous Continuous    sodium chloride 0.9 % IV bolus 100 mL  100 mL Intravenous Q30 Min PRN    And    albumin human (Albumin) 25% injection 25 g  25 g Intravenous PRN Dialysis    lidocaine-menthol 4-1 % patch 2 patch  2 patch Transdermal Daily    dexmedeTOMIDine in sodium chloride 0.9% (Precedex) 400 mcg/100mL infusion premix  0.2-1.5 mcg/kg/hr (Dosing Weight) Intravenous Continuous    heparin (Porcine) 5000 UNIT/ML injection 5,000 Units  5,000 Units Subcutaneous Q8H STEPHANIE    fentaNYL (Sublimaze) 25 mcg BOLUS FROM BAG infusion  25 mcg Intravenous PRN    pantoprazole (Protonix) 40 mg in sodium chloride 0.9% PF 10 mL IV push  40 mg Intravenous Q12H    piperacillin-tazobactam (Zosyn) 3.375 g in dextrose 5% 100 mL IVPB-ADDV  3.375 g Intravenous Q12H    hydrALAzine (Apresoline) 20 mg/mL injection 10 mg  10 mg Intravenous Q6H PRN    labetalol (Trandate) 5 mg/mL injection 10 mg  10 mg Intravenous Q4H PRN    senna (Senokot) 8.8 MG/5ML oral syrup 17.6 mg  10 mL Per NG Tube Nightly PRN    cetirizine (ZyrTEC) tab 5 mg  5 mg Per NG Tube Daily     carvedilol (Coreg) tab 25 mg  25 mg Per NG Tube BID    atorvastatin (Lipitor) tab 40 mg  40 mg Per NG Tube Nightly    acetaminophen (Tylenol) 160 MG/5ML oral liquid 500 mg  500 mg Per NG Tube Daily PRN    acetaminophen (Tylenol) tab 650 mg  650 mg Oral Q4H PRN    Or    acetaminophen (Tylenol) 160 MG/5ML oral liquid 650 mg  650 mg Per NG Tube Q4H PRN    Or    acetaminophen (Tylenol) rectal suppository 650 mg  650 mg Rectal Q4H PRN    Or    acetaminophen (Ofirmev) 10 mg/mL infusion premix 1,000 mg  1,000 mg Intravenous Q6H PRN    polyethylene glycol (PEG 3350) (Miralax) 17 g oral packet 17 g  17 g Per NG Tube Daily PRN    insulin aspart (NovoLOG) 100 Units/mL FlexPen 1-7 Units  1-7 Units Subcutaneous q6h    heparin (Porcine) 1000 UNIT/ML injection 1,500 Units  1.5 mL Intravenous PRN Dialysis    lidocaine-prilocaine (Emla) 2.5-2.5 % cream   Topical PRN    bisacodyl (Dulcolax) 10 MG rectal suppository 10 mg  10 mg Rectal Daily PRN    chlorhexidine gluconate (Peridex) 0.12 % oral solution 15 mL  15 mL Mouth/Throat BID@0800,2000    propofol (Diprivan) 10 mg/mL infusion premix  5-50 mcg/kg/min (Dosing Weight) Intravenous Continuous    fentaNYL in sodium chloride 0.9% (Sublimaze) 1000 mcg/100mL infusion premix   mcg/hr Intravenous Continuous PRN    albuterol (Ventolin HFA) 108 (90 Base) MCG/ACT inhaler 2 puff  2 puff Inhalation Q4H PRN    fluticasone propionate (Flonase) 50 MCG/ACT nasal suspension 2 spray  2 spray Nasal Daily PRN    glucose (Dex4) 15 GM/59ML oral liquid 15 g  15 g Oral Q15 Min PRN    Or    glucose (Glutose) 40% oral gel 15 g  15 g Oral Q15 Min PRN    Or    glucose-vitamin C (Dex-4) chewable tab 4 tablet  4 tablet Oral Q15 Min PRN    Or    dextrose 50% injection 50 mL  50 mL Intravenous Q15 Min PRN    Or    glucose (Dex4) 15 GM/59ML oral liquid 30 g  30 g Oral Q15 Min PRN    Or    glucose (Glutose) 40% oral gel 30 g  30 g Oral Q15 Min PRN    Or    glucose-vitamin C (Dex-4) chewable tab 8 tablet  8  tablet Oral Q15 Min PRN    ipratropium-albuterol (Duoneb) 0.5-2.5 (3) MG/3ML inhalation solution 3 mL  3 mL Nebulization Q6H PRN    fluticasone-salmeterol (Advair Diskus) 250-50 MCG/ACT inhaler 1 puff  1 puff Inhalation BID       Robel Saldana MD  10/2/2024  8:58 AM

## 2024-10-02 NOTE — PLAN OF CARE
Patient BP very labile today.  Breathing trial this am.  Patient initally tolerated well but then became tachycardic, tachypneic and had blood pressure increase over 200 systolic.  Patient denied being in pain at the time.  PRN Labetalol given with improvement in heartrate and BP.  Fentanyl was restarted for pain management.  Patient then became anxious and agitated while attempting to communicate with RN and family.  Patient was placed back on full ventilator support.  Despite reorientation and increased communication patient remained agitated.  Propofol was restarted.  Shortly after restarting propofol patient had hypotension requiring a fluid bolus and Levophed.  Pt required pressor support for less than an hour.  Pt continued to be agitated and Propofol was able to be restarted.  Propofol was stopped this afternoon and precedex was started in hopes that patient may have better tolerance of breathing trial tomorrow.    Patient remained restrained today to maintain his safety.  Family was educated on the continued need for restraints.  No evidence of injury to bilateral wrists.    Family had several questions for both cardiology and orthopedic service.  Dr. Julio saw patient this afternoon.  Cardiology service was notified via [employer] serve that Dr. Murphy from [facility] did want additional cardiology approval for Left hip fracture surgery.  Dr. Irizarry also notified that Dr. Murphy is awaiting primary care okay to proceed with surgery.    Family present at bedside and updated continuously regarding plan of care.    Problem: Safety Risk - Non-Violent Restraints  Goal: Patient will remain free from self-harm  Description: INTERVENTIONS:  - Apply the least restrictive restraint to prevent harm  - Notify patient and family of reasons restraints applied  - Assess for any contributing factors to confusion (electrolyte disturbances, delirium, medications)  - Discontinue any unnecessary medical devices as soon as  possible  - Assess the patient's physical comfort, circulation, skin condition, hydration, nutrition and elimination needs   - Reorient and redirection as needed  - Assess for the need to continue restraints  Outcome: Progressing

## 2024-10-02 NOTE — PLAN OF CARE
Restart propofol gtts overnight d/t restlessness and inability to sleep, nocturnalist notified. See EMAR for titration of gtts. Safety maintained throughout shift with frequent monitoring and placing bed in lowest position. Bed alarm on. Pt's family member(grandson) at the bedside, all questions answered throughout shift and POC updates given.    Problem: Diabetes/Glucose Control  Goal: Glucose maintained within prescribed range  Description: INTERVENTIONS:  - Teri or Blood Glucose as ordered  - Assess for signs and symptoms of hyperglycemia and hypoglycemia  - Administer ordered medications to maintain glucose within target range  - Assess barriers to adequate nutritional intake and initiate nutrition consult as needed  - Instruct patient on self management of diabetes  Outcome: Progressing     Problem: PAIN - ADULT  Goal: Verbalizes/displays adequate comfort level or patient's stated pain goal  Description: INTERVENTIONS:  - Encourage pt to monitor pain and request assistance  - Assess pain using appropriate pain scale  - Administer analgesics based on type and severity of pain and evaluate response  - Implement non-pharmacological measures as appropriate and evaluate response  - Consider cultural and social influences on pain and pain management  - Manage/alleviate anxiety  - Utilize distraction and/or relaxation techniques  - Monitor for opioid side effects  - Notify MD/LIP if interventions unsuccessful or patient reports new pain  - Anticipate increased pain with activity and pre-medicate as appropriate  Outcome: Progressing     Problem: SAFETY ADULT - FALL  Goal: Free from fall injury  Description: INTERVENTIONS:  - Assess pt frequently for physical needs  - Identify cognitive and physical deficits and behaviors that affect risk of falls.  - [place] fall precautions as indicated by assessment.  - Educate pt/family on patient safety including physical limitations  - Instruct pt to call for assistance with  activity based on assessment  - Modify environment to reduce risk of injury  - Provide assistive devices as appropriate  - Consider OT/PT consult to assist with strengthening/mobility  - Encourage toileting schedule  Outcome: Progressing     Problem: Safety Risk - Non-Violent Restraints  Goal: Patient will remain free from self-harm  Description: INTERVENTIONS:  - Apply the least restrictive restraint to prevent harm  - Notify patient and family of reasons restraints applied  - Assess for any contributing factors to confusion (electrolyte disturbances, delirium, medications)  - Discontinue any unnecessary medical devices as soon as possible  - Assess the patient's physical comfort, circulation, skin condition, hydration, nutrition and elimination needs   - Reorient and redirection as needed  - Assess for the need to continue restraints  10/2/2024 0500 by Bindu Broussard RN  Outcome: Progressing  10/1/2024 2009 by Bindu Broussard RN  Outcome: Progressing     Problem: CARDIOVASCULAR - ADULT  Goal: Maintains optimal cardiac output and hemodynamic stability  Description: INTERVENTIONS:  - Monitor vital signs, rhythm, and trends  - Monitor for bleeding, hypotension and signs of decreased cardiac output  - Evaluate effectiveness of vasoactive medications to optimize hemodynamic stability  - Monitor arterial and/or venous puncture sites for bleeding and/or hematoma  - Assess quality of pulses, skin color and temperature  - Assess for signs of decreased coronary artery perfusion - ex. Angina  - Evaluate fluid balance, assess for edema, trend weights  Outcome: Progressing  Goal: Absence of cardiac arrhythmias or at baseline  Description: INTERVENTIONS:  - Continuous cardiac monitoring, monitor vital signs, obtain 12 lead EKG if indicated  - Evaluate effectiveness of antiarrhythmic and heart rate control medications as ordered  - Initiate emergency measures for life threatening arrhythmias  - Monitor electrolytes  and administer replacement therapy as ordered  Outcome: Progressing     Problem: RESPIRATORY - ADULT  Goal: Achieves optimal ventilation and oxygenation  Description: INTERVENTIONS:  - Assess for changes in respiratory status  - Assess for changes in mentation and behavior  - Position to facilitate oxygenation and minimize respiratory effort  - Oxygen supplementation based on oxygen saturation or ABGs  - Provide Smoking Cessation handout, if applicable  - Encourage broncho-pulmonary hygiene including cough, deep breathe, Incentive Spirometry  - Assess the need for suctioning and perform as needed  - Assess and instruct to report SOB or any respiratory difficulty  - Respiratory Therapy support as indicated  - Manage/alleviate anxiety  - Monitor for signs/symptoms of CO2 retention  Outcome: Progressing

## 2024-10-02 NOTE — TELEPHONE ENCOUNTER
Ortho Surgery Request  Surgery: Left anterior bipolar cemented hemiarthroplasty      Surgical Assistant: Tanya Rivas PA-C and Gurjit Agosto CSA  Surgery Day Request: 10/4/2024 morning  Estimated Procedure Length: 2.5 hours  Anesthesia type: General   Position: Supine on [place] table  Special Needs:[]Mini C-Arm  [x]Large C-arm  []Nurse Assist [x]SCD's [x]Surgical Assistant []Ultrasound []Ultrasound Edi  Equipment: Charlie anterior tools and implants for bipolar and total hip arthroplasty. [place] table, large C-arm fluoroscopy  Location: Main OR  Post Op Visit Date: 3 weeks  CPT Code:   ICD Code:  Medical Clearance Needed: Patient already cleared by Cardiology  Preadmission Testing Orders:  None  Additional PAT orders: none  Pre OP Orders:  Use EM procedure driven order set in addition to anesthesia protocol  Additional Pre Op Orders:  PCN Allergy:  No  If Yes:   Proceed with PCN/Cephalosporin recommend antibiotic as benefit outweighs risk  Admit:  Inpatient  Home Health  No

## 2024-10-02 NOTE — PLAN OF CARE
Problem: RESPIRATORY - ADULT  Goal: Achieves optimal ventilation and oxygenation  Description: INTERVENTIONS:  - Assess for changes in respiratory status  - Assess for changes in mentation and behavior  - Position to facilitate oxygenation and minimize respiratory effort  - Oxygen supplementation based on oxygen saturation or ABGs  - Provide Smoking Cessation handout, if applicable  - Encourage broncho-pulmonary hygiene including cough, deep breathe, Incentive Spirometry  - Assess the need for suctioning and perform as needed  - Assess and instruct to report SOB or any respiratory difficulty  - Respiratory Therapy support as indicated  - Manage/alleviate anxiety  - Monitor for signs/symptoms of CO2 retention  Outcome: Progressing   Patient received intubated and on ventilator A/C 14/500/+5/30%. Bilateral breath sounds auscultated. Suction provided when indicated. SBT initiated at 0810, see documentation below. No acute events during the daytime. RT will continue to monitor.    SBT: PASS/FAIL? (Fail)  Start time:  0810  Settings:  Pressure Support: 5    CPAP: 5    FiO2:  30%  Reason for Failure if present: (Delete ALL reasons that do not apply)  RR <10, >35   Patient RR = 41  RSBI > 104   Actual RSBI score: 195    Weaning Parameters:  RR: 28  RSBI: 86  NIF: -19  VT: 380  VC: 700   (Note: If VC unable, enter same as VT in Epic to charge)    Returned to Full support: (Time:0820)  MD notified: (Physician: Leti)  Current Ventilator Settings:   - Mode: A/C   - Rate: 14   - Tidal Volume:500   - FiO2: 30   - PEEP 5

## 2024-10-02 NOTE — PROGRESS NOTES
[facility]  part of [facility]    Progress Note    Ollie Hernández Patient Status:  Inpatient    1945 MRN [id number]   Location [facility] 2W/SW Attending Pranay Michel MD   Hosp Day # 4 PCP Wili Parmar MD     Chief Complaint:   Chief Complaint   Patient presents with    Fall       Subjective:   Ollie Hernández is intubated, sedated.   No events overnight.     On low dose levophed  Objective:   Objective:    Blood pressure 106/50, pulse 80, temperature 97.3 °F (36.3 °C), temperature source Temporal, resp. rate 14, weight 143 lb 11.8 oz (65.2 kg), SpO2 100%.    Physical Exam:    General: intubated sedated.   Respiratory: Clear to auscultation bilaterally. No wheezes. No rhonchi.  Cardiovascular: S1, S2. Regular rate and rhythm. No murmurs, rubs or gallops.   Abdomen: Soft, nontender, nondistended.  Positive bowel sounds. No rebound or guarding.  Neurologic: No focal neurological deficits.   Musculoskeletal: Moves all extremities.  Extremities: No edema.      Results:   Results:    Labs:  Recent Labs   Lab 24  1204 24  0438 24  0427 24  1620 10/01/24  0330 10/02/24  0450   WBC 9.1 17.9* 11.2*  --  10.7 8.1   HGB 11.8* 13.1 10.0* 9.9* 10.5* 9.3*   MCV 92.9 96.8 93.2  --  93.5 89.9   .0 188.0 141.0*  --  148.0* 159.0       Recent Labs   Lab 24  1204 24  0438 24  0755 24  0427 10/01/24  0330 10/02/24  0437   *   < > 176* 117* 135* 140*   BUN 59*   < > 62* 55* 36* 61*   CREATSERUM 4.85*   < > 5.38* 4.88* 3.98* 5.88*   CA 10.2   < > 9.5 9.9 9.8 9.9   ALB 3.9  --  3.7  --  3.9  --       < > 140 139 139 137   K 3.9   < > 4.4 3.5 3.5 3.4*      < > 104 98 99 98   CO2 31.0   < > 25.0 29.0 29.0 24.0   ALKPHO 75  --  75  --  63  --    AST 30  --  23  --  22  --    ALT 17  --  13  --  9*  --    BILT 0.5  --  0.7  --  1.2*  --    TP 7.2  --  7.2  --  7.0  --     < > = values in this interval not displayed.        Estimated Creatinine Clearance: 8.8 mL/min (A) (based on SCr of 5.88 mg/dL (H)).    No results for input(s): \"PTP\", \"INR\" in the last 168 hours.         Culture:  Hospital Encounter on 09/28/24   1. Blood Culture     Status: None (Preliminary result)    Collection Time: 09/30/24 10:03 AM    Specimen: Bld,Arterial Line; Blood   Result Value Ref Range    Blood Culture Result No Growth 2 Days N/A       Cardiac  No results for input(s): \"TROP\", \"PBNP\" in the last 168 hours.      Imaging: Imaging data reviewed in Epic.  XR CHEST AP PORTABLE  (CPT=71045)    Result Date: 10/2/2024  CONCLUSION:   Interstitial pulmonary edema.  Improvement in alveolar opacities/edema compared to prior.  3.8 cm ovoid opacity over the left upper lung, increased from prior, likely artifactual/external to the patient.  Recommend attention on follow-up.    Dictated by (CST): Apple Pierre MD on 10/02/2024 at 7:43 AM     Finalized by (CST): Apple Pierre MD on 10/02/2024 at 7:46 AM          US VENOUS DOPPLER LEG BILAT - DIAG IMG (CPT=93970)    Result Date: 10/1/2024  CONCLUSION:  Negative for sonographic evidence of deep venous thrombosis in either lower extremity.    Dictated by (CST): Yung Rios MD on 10/01/2024 at 6:51 AM     Finalized by (CST): Yung Rios MD on 10/01/2024 at 6:51 AM           Medications:    senna  10 mL Oral BID    docusate  100 mg Oral BID    mineral oil-white petrolatum  1 Application Both Eyes Nightly    lidocaine-menthol  2 patch Transdermal Daily    heparin  5,000 Units Subcutaneous Q8H STEPHANIE    pantoprazole  40 mg Intravenous Q12H    piperacillin-tazobactam  3.375 g Intravenous Q12H    cetirizine  5 mg Per NG Tube Daily    carvedilol  25 mg Per NG Tube BID    atorvastatin  40 mg Per NG Tube Nightly    insulin aspart  1-7 Units Subcutaneous q6h    chlorhexidine gluconate  15 mL Mouth/Throat BID@0800,2000    fluticasone-salmeterol  1 puff Inhalation BID         Assessment and Plan:   Assessment & Plan:         Acute hypoxic respiratory failure   Hypotension   Aspiration PNA  Pulmonary and cardiology on consult.   Secondary to pulmonary edema and aspiration  Intubated 9/29 - cont full vent support.   Empiric zosyn.   Sputum cx normal julio c. Blood cx x 4 NGTD  Daily SBT.   Lactic acid normal. WBC trending down   Volume management per nephrology   CXR pulmonary edema. Improved alveolar opacities.   Wean pressors as able to keep MAP > 65 or sBP > 90   ESRD   HD per nephrology MWF  Nephro on consult.   Hip fracture   S/p fall PTA   Orthopedic surgery on consult.   Plans for surgery Thursday  DM II   A1c   ISS   Paroxysmal A.fib - currently NSR   Acute on chronic HFpEF  CAD s/p jodi circ 5/24'   Elevate troponin secondary to demand ischemia   S/p watchman device 9/11/24  Cont coreg, statin  No further cardiac testing prior to planned surgery.   Resume ASA/Plavix once safely able to do so.   Coffee ground emesis   Resolved.   IV protonix BID        >55min spent, >50% spent counseling and coordinating care in the form of educating pt/family and d/w consultants and staff. Most of the time spent discussing the above plan.        Plan of care discussed with patient or family at bedside.    Pranay Michel MD  Hospitalist          Supplementary Documentation:     Quality:  DVT Prophylaxis: heparin   CODE status: Full  Dispo: per clinical course            Estimated date of discharge: TBD  Discharge is dependent on: clinical stability  At this point Mr. Hernández is expected to be discharge to: TBD

## 2024-10-02 NOTE — PLAN OF CARE
Norepinephrine off BP(MAP) wnl, precedex and fentanyl for sedation/comfort, waiting for ortho/surgery to see patient an family  Problem: Diabetes/Glucose Control  Goal: Glucose maintained within prescribed range  Description: INTERVENTIONS:  - Monitor Blood Glucose as ordered  - Assess for signs and symptoms of hyperglycemia and hypoglycemia  - Administer ordered medications to maintain glucose within target range  - Assess barriers to adequate nutritional intake and initiate nutrition consult as needed  - Instruct patient on self management of diabetes  Outcome: Progressing     Problem: PAIN - ADULT  Goal: Verbalizes/displays adequate comfort level or patient's stated pain goal  Description: INTERVENTIONS:  - Encourage pt to monitor pain and request assistance  - Assess pain using appropriate pain scale  - Administer analgesics based on type and severity of pain and evaluate response  - Implement non-pharmacological measures as appropriate and evaluate response  - Consider cultural and social influences on pain and pain management  - Manage/alleviate anxiety  - Utilize distraction and/or relaxation techniques  - Monitor for opioid side effects  - Notify MD/LIP if interventions unsuccessful or patient reports new pain  - Anticipate increased pain with activity and pre-medicate as appropriate  Outcome: Progressing     Problem: SAFETY ADULT - FALL  Goal: Free from fall injury  Description: INTERVENTIONS:  - Assess pt frequently for physical needs  - Identify cognitive and physical deficits and behaviors that affect risk of falls.  - [place] fall precautions as indicated by assessment.  - Educate pt/family on patient safety including physical limitations  - Instruct pt to call for assistance with activity based on assessment  - Modify environment to reduce risk of injury  - Provide assistive devices as appropriate  - Consider OT/PT consult to assist with strengthening/mobility  - Encourage toileting  schedule  Outcome: Progressing     Problem: Safety Risk - Non-Violent Restraints  Goal: Patient will remain free from self-harm  Description: INTERVENTIONS:  - Apply the least restrictive restraint to prevent harm  - Notify patient and family of reasons restraints applied  - Assess for any contributing factors to confusion (electrolyte disturbances, delirium, medications)  - Discontinue any unnecessary medical devices as soon as possible  - Assess the patient's physical comfort, circulation, skin condition, hydration, nutrition and elimination needs   - Reorient and redirection as needed  - Assess for the need to continue restraints  Outcome: Progressing     Problem: CARDIOVASCULAR - ADULT  Goal: Maintains optimal cardiac output and hemodynamic stability  Description: INTERVENTIONS:  - Monitor vital signs, rhythm, and trends  - Monitor for bleeding, hypotension and signs of decreased cardiac output  - Evaluate effectiveness of vasoactive medications to optimize hemodynamic stability  - Monitor arterial and/or venous puncture sites for bleeding and/or hematoma  - Assess quality of pulses, skin color and temperature  - Assess for signs of decreased coronary artery perfusion - ex. Angina  - Evaluate fluid balance, assess for edema, trend weights  10/2/2024 1802 by Matilde Coulter RN  Outcome: Progressing  10/2/2024 1433 by Matilde Coulter RN  Outcome: Progressing  Goal: Absence of cardiac arrhythmias or at baseline  Description: INTERVENTIONS:  - Continuous cardiac monitoring, monitor vital signs, obtain 12 lead EKG if indicated  - Evaluate effectiveness of antiarrhythmic and heart rate control medications as ordered  - Initiate emergency measures for life threatening arrhythmias  - Monitor electrolytes and administer replacement therapy as ordered  10/2/2024 1802 by Matilde Coulter, RN  Outcome: Progressing  10/2/2024 1433 by Matilde Coulter RN  Outcome: Progressing     Problem: RESPIRATORY - ADULT  Goal:  Achieves optimal ventilation and oxygenation  Description: INTERVENTIONS:  - Assess for changes in respiratory status  - Assess for changes in mentation and behavior  - Position to facilitate oxygenation and minimize respiratory effort  - Oxygen supplementation based on oxygen saturation or ABGs  - Provide Smoking Cessation handout, if applicable  - Encourage broncho-pulmonary hygiene including cough, deep breathe, Incentive Spirometry  - Assess the need for suctioning and perform as needed  - Assess and instruct to report SOB or any respiratory difficulty  - Respiratory Therapy support as indicated  - Manage/alleviate anxiety  - Monitor for signs/symptoms of CO2 retention  Outcome: Progressing

## 2024-10-02 NOTE — PLAN OF CARE
Problem: RESPIRATORY - ADULT  Goal: Achieves optimal ventilation and oxygenation  Description: INTERVENTIONS:  - Assess for changes in respiratory status  - Assess for changes in mentation and behavior  - Position to facilitate oxygenation and minimize respiratory effort  - Oxygen supplementation based on oxygen saturation or ABGs  - Provide Smoking Cessation handout, if applicable  - Encourage broncho-pulmonary hygiene including cough, deep breathe, Incentive Spirometry  - Assess the need for suctioning and perform as needed  - Assess and instruct to report SOB or any respiratory difficulty  - Respiratory Therapy support as indicated  - Manage/alleviate anxiety  - Monitor for signs/symptoms of CO2 retention  Outcome: Progressing     Pt remains intubated with ETT size 8.0 at 25 cm at the lip, on full support. Pt is stable and tolerating well, saturating appropriately. Suction provided as needed, no changes at this time, RT will continue to monitor.    Vent settings and readings as follow:     10/01/24 1955   Vent Information   Is this patient on Chronic Ventilation? No   Interface Invasive   Vent Type AV   Vent plugged into main power? Yes   Vent ID    Vent Mode VC/AC   Settings   FiO2 (%) 30 %   Resp Rate (Set) 14   Vt (Set, mL) 500 mL   Waveform Decelerating ramp   PEEP/CPAP (cm H2O) 5 cm H20   Peak Flow LPM 60   Trigger Sensitivity Flow (L/min) 1 L/min   Trigger Sensitivity Pressure (cm H2O) 3 cm H2O   Humidification Heat and moisture exchanger   Readings   Total RR 14   Minute Ventilation (L/min) 6.1 L/min   Expiratory Tidal Volume 430 mL   PIP Observed (cm H2O) 24 cm H2O   MAP (cm H2O) 8   PEEP Low (cm H2O) 2 cm H2O   I/E Ratio 1:5.2   Plateau Pressure (cm H2O) 20 cm H2O   Static Compliance (L/cm H2O) 28   Dynamic Compliance (L/cm H2O) 22 L/cm H2O   Alarms   High RR 40   Insp Pressure High (cm H2O) 40 cm H2O   MV High (L/min) 20 L/min   MV Low (L/min) 3 L/min   Apnea Interval (sec) 20 seconds       10/01/24 1955   ETT   Placement Date/Time: 09/29/24 (c) 7097   Airway Size: 8 mm  Cuffed: Cuffed  Insertion attempts: 1  Technique: Video laryngoscopy  Placement Verification: Capnometry  Placed By: Anesthesiologist   Secured at (cm) 25 cm   Cuff Pressure (cm H2O) 29 cm H2O   Suctioned? N   Measured From Lips   Secured Location Center   Secured by Commercial tube reyes   Req'd equipment at bedside Bag mask   Additional Assessments   Pulse 84   Resp 14   SpO2 100 %

## 2024-10-02 NOTE — PROGRESS NOTES
[facility]  part of [facility]    Progress Note    Ollie Hernández Patient Status:  Inpatient    1945 MRN [id number]   Location [facility] 2W/SW Attending Radha Gabriel MD   Hosp Day # 4 PCP Wili Parmar MD       Subjective:   Ollie Hernández is a(n) 79 year old male who I am seeing for ESRD    Intubated  No overnight issues  On nor epi  Starting HD    Objective:   /50 (BP Location: Right arm)   Pulse 80   Temp 97.3 °F (36.3 °C) (Temporal)   Resp 14   Wt 143 lb 11.8 oz (65.2 kg)   SpO2 100%   BMI 24.67 kg/m²      Intake/Output Summary (Last 24 hours) at 10/2/2024 1453  Last data filed at 10/2/2024 1400  Gross per 24 hour   Intake 1354.42 ml   Output 2120 ml   Net -765.58 ml     Wt Readings from Last 1 Encounters:   10/02/24 143 lb 11.8 oz (65.2 kg)       Exam  Vital signs: Blood pressure 106/50, pulse 80, temperature 97.3 °F (36.3 °C), temperature source Temporal, resp. rate 14, weight 143 lb 11.8 oz (65.2 kg), SpO2 100%.    General: No acute distress.   HEENT: Moist mucous membranes. EOMI. ETT  Neck:  No JVD.   Respiratory: Clear to auscultation bilaterally.    Cardiovascular: S1, S2.  Regular rate and rhythm.   Abdomen: Soft, nontender, nondistended.    Neurologic: No focal neurological deficits.  Musculoskeletal: Full range of motion of all extremities.  No swelling noted.  Access:AVF    Results:     Recent Labs   Lab 24  0438 24  0427 24  1620 10/01/24  0330 10/02/24  0450   RBC 4.33 3.24*  --  3.40* 3.06*   HGB 13.1 10.0* 9.9* 10.5* 9.3*   HCT 41.9 30.2* 28.5* 31.8* 27.5*   MCV 96.8 93.2  --  93.5 89.9   MCH 30.3 30.9  --  30.9 30.4   MCHC 31.3 33.1  --  33.0 33.8   RDW 15.8* 16.1*  --  16.6* 16.4*   NEPRELIM 16.42* 9.33*  --  8.99*  --    WBC 17.9* 11.2*  --  10.7 8.1   .0 141.0*  --  148.0* 159.0         Recent Labs   Lab 24  0427 10/01/24  0330 10/02/24  0437   * 135* 140*   BUN 55* 36* 61*   CREATSERUM 4.88* 3.98*  5.88*   CA 9.9 9.8 9.9    139 137   K 3.5 3.5 3.4*   CL 98 99 98   CO2 29.0 29.0 24.0          XR CHEST AP PORTABLE  (CPT=71045)    Result Date: 10/2/2024  CONCLUSION:   Interstitial pulmonary edema.  Improvement in alveolar opacities/edema compared to prior.  3.8 cm ovoid opacity over the left upper lung, increased from prior, likely artifactual/external to the patient.  Recommend attention on follow-up.    Dictated by (CST): Apple Pierre MD on 10/02/2024 at 7:43 AM     Finalized by (CST): Apple Pierre MD on 10/02/2024 at 7:46 AM          US VENOUS DOPPLER LEG BILAT - DIAG IMG (CPT=93970)    Result Date: 10/1/2024  CONCLUSION:  Negative for sonographic evidence of deep venous thrombosis in either lower extremity.    Dictated by (CST): Yung Rios MD on 10/01/2024 at 6:51 AM     Finalized by (CST): Yung Rios MD on 10/01/2024 at 6:51 AM           Assessment and Plan:     79 year old male with past medical history of T2DM, HTN, HLD, ESRD on HD MWF, CAD s/p PCI (5/2024), A.fib on Eliquis, HFpEF, KRAIG, BPH, history of recurrent gastrointestinal bleeding presented to the ER after a fall sustaining a left hip fracture    Impression:    ESRD, HD Monday Wednesday Friday  Hypoxic respiratory failure, possible aspiration; now intubated  Left hip fracture, Ortho following  Hypertension with ESRD  Diabetes with nephropathy  A-fib s/p watchman  History of GI bleed  Anemia, hgb is stable  Secondary hyperparathyroidism, monitor calcium and phosphorus      Plan:    HD Monday Wednesday schedule      Thank you very much for allowing me to participate in the care of your patient.  If you have any questions, please do not hesitate to contact me.     Moni Pugh MD

## 2024-10-02 NOTE — DIETARY NOTE
ADULT NUTRITION INITIAL ASSESSMENT    Pt is at high nutrition risk.  Pt does not meet malnutrition criteria.        RECOMMENDATIONS TO MD: See Nutrition Intervention for Enteral Nutrition  (EN) rx.     ADMITTING DIAGNOSIS:  Closed fracture of left hip, initial encounter ([facility]) [S72.002A]  PERTINENT PAST MEDICAL HISTORY:   Past Medical History:    Anemia    Asthma ([facility])    Back problem    BPH (benign prostatic hyperplasia)    Calculus of kidney    Cataract    Coronary atherosclerosis    Diabetes ([facility])    ESRD (end stage renal disease) on dialysis ([facility])    Essential hypertension    High blood pressure    High cholesterol    History of blood transfusion    Hyperlipidemia    Neuropathy    hands and feet    KRAIG on CPAP    Renal disorder    Sleep apnea    Visual impairment    glasses    Vocal cord paralysis, unilateral partial       PATIENT STATUS: Initial 10/02/24: Pt admit for displaced left femoral neck fracture with uncontrolled pain-possible surgery planned. Pt intubated on 9/29 for respiratory failure. There was a concern for GI bleed thus held off on tube feeds. PMH as above.and notable for ESRD HD.   Pt assessed due to consult for tube feeding. Pt NPO day# 4. (See brief note per RD 9/30). Discussed with RN last pm re: potential tube feeding start pending Ortho surgeon jonathan . GI status improved. Propofol low dose in progress (~220 kcals/lipids) Pt screened at no nutrition risk on admit however weight hx reflects a 10% wt loss over past ~ 1 month: this is a severe weight loss. Will clarify with family as able.  Current BMI reflects wt/ht wnl. Diet hx to be obtained.   Appropriate to start Nepro formula-see orders below. No documentation of tube feed start- possible holding for surgery or possible extubation? Will discuss at rounds today.     FOOD/NUTRITION RELATED HISTORY:  Appetite: Good PTA.   Intake: NPO  Intake Meeting Needs: TF + non-nutrition kcals meeting needs once to goal rate.   Percent Meals Eaten (last 3  days)       Date/Time Percent Meals Eaten (%)    09/29/24 0800 0 %    09/29/24 2000 0 %    09/30/24 0000 0 %    10/01/24 2200 0 %           Food Allergies: No Known Food Allergies (NKFA)  Cultural/Ethnic/[employer]ist Preferences: Not Obtained    GASTROINTESTINAL: +BM 9/27. OG tube in place for EN access.     MEDICATIONS: reviewed Propofol 8.5 mlhr (~224 kcals)   norepinephrine Stopped (10/01/24 1150)    dexmedetomidine Stopped (10/01/24 2307)    propofol 20 mcg/kg/min (10/02/24 0822)    fentanyl 50 mcg/hr (10/01/24 2059)        lidocaine-menthol  2 patch Transdermal Daily    heparin  5,000 Units Subcutaneous Q8H STEPHANIE    pantoprazole  40 mg Intravenous Q12H    piperacillin-tazobactam  3.375 g Intravenous Q12H    cetirizine  5 mg Per NG Tube Daily    carvedilol  25 mg Per NG Tube BID    atorvastatin  40 mg Per NG Tube Nightly    insulin aspart  1-7 Units Subcutaneous q6h    chlorhexidine gluconate  15 mL Mouth/Throat BID@0800,2000    fluticasone-salmeterol  1 puff Inhalation BID   Prn:   sodium chloride **AND** albumin human    fentaNYL (Sublimaze)    hydrALAzine    labetalol    senna    acetaminophen    acetaminophen **OR** acetaminophen **OR** acetaminophen **OR** acetaminophen    polyethylene glycol (PEG 3350)    heparin    lidocaine-prilocaine    bisacodyl    fentanyl    albuterol    fluticasone propionate    glucose **OR** glucose **OR** glucose-vitamin C **OR** dextrose **OR** glucose **OR** glucose **OR** glucose-vitamin C    ipratropium-albuterol    LABS: reviewed CKD on HD  Recent Labs     09/29/24  0438 09/29/24  0755 09/30/24  0427 10/01/24  0330 10/02/24  0437   *   < > 117* 135* 140*   BUN 59*   < > 55* 36* 61*   CREATSERUM 5.55*   < > 4.88* 3.98* 5.88*   CA 9.5   < > 9.9 9.8 9.9   MG 1.8  --  1.8  --   --       < > 139 139 137   K 4.3   < > 3.5 3.5 3.4*      < > 98 99 98   CO2 26.0   < > 29.0 29.0 24.0   PHOS 6.4*  --  5.6*  --   --    OSMOCALC 310*   < > 304* 298* 304*    < > = values in  this interval not displayed.       NUTRITION RELATED PHYSICAL FINDINGS:  - Nutrition Focused Physical Exam (NFPE): well nourished per visual exam  - Fluid Accumulation: none  see RN documentation for details  - Skin Integrity: intact see RN documentation for details    ANTHROPOMETRICS:  HT:  5'4\"   WT: 65.2 kg (143 lb 11.8 oz)   BMI: Body mass index is 24.67 kg/m².  BMI CLASSIFICATION: 19-24.9 kg/m2 - WNL  IBW: 130  lbs        110 % IBW  Usual Body Wt: 159 lbs 8/22/24      90% UBW (10% loss/1 month: severe loss.     WEIGHT HISTORY:  Patient Weight(s) for the past 336 hrs:   Weight   10/02/24 0800 65.2 kg (143 lb 11.8 oz)   10/01/24 0600 72.6 kg (160 lb 0.9 oz)   09/30/24 0400 74.4 kg (164 lb 0.4 oz)   09/28/24 1649 70.6 kg (155 lb 11.2 oz)     Wt Readings from Last 10 Encounters:   10/02/24 65.2 kg (143 lb 11.8 oz)   08/22/24 72.5 kg (159 lb 12.8 oz)   08/20/24 71.7 kg (158 lb)   08/13/24 65.6 kg (144 lb 11.2 oz)   08/06/24 70.8 kg (156 lb)   08/01/24 66.5 kg (146 lb 8 oz)   06/27/24 73.9 kg (163 lb)   06/13/24 71 kg (156 lb 8 oz)   06/05/24 68.2 kg (150 lb 4.8 oz)   05/28/24 74.2 kg (163 lb 9.6 oz)     NUTRITION DIAGNOSIS/PROBLEM:   Inadequate oral intake related to Decreased ability to consume sufficient energy in the setting of intubation as evidenced by NPO, 0 nutrition intake.     NUTRITION INTERVENTION:     NUTRITION PRESCRIPTION:   Estimated Nutrition needs: --dosing wt of 65  kg - wt taken on 10/2  Calories: 9216-5726 calories/day (25-30 calories per kg Dosing wt)  Protein: 78-98 g protein/day (1.2-1.5  g protein/kg Dosing wt)  Fluid Needs: 1625 ml (1 ml/kcal). Adjust per clinical status (ESRD, oliguric)     - Diet:       Procedures    NPO   - EN rx: via OG tube: Nepro 25 ml/hr; advance 10 ml q 4 hrs to goal 45 ml/hr X ave 22 hr infusion time. 1782kcals (2002 kcals, 103% goal kcals, 80 g protein, 100% protein goal, 722 ml h20, FWF: 30 ml q 4 hrs (180 ml), 902 ml total h20, >100% RDI's.   - Medical Food  Supplements-NPO.  - Vitamin and mineral supplements: NPO  - Feeding assistance: NPO  - Nutrition education: assess education needs   - Coordination of nutrition care: collaboration with other providers and discussed in Care Rounds   - Discharge and transfer of nutrition care to new setting or provider: monitor plans    MONITOR AND EVALUATE/NUTRITION GOALS:  - Food and Nutrient Intake:      Monitor: for PO initiation  - Food and Nutrient Administration:      Monitor: enteral nutrition initiation, tolerance to enteral nutrition, and for enteral nutrition adjustment  - Anthropometric Measurement:    Monitor weight  - Nutrition Goals:      EN + non-nutritive kcal >80% energy needs and labs within acceptable limits. Once extubated transition back to po intake as deemed safe.     DIETITIAN FOLLOW UP: ZAIDA Guevara RD, LDN, CNSC ([id number])

## 2024-10-03 ENCOUNTER — APPOINTMENT (OUTPATIENT)
Dept: GENERAL RADIOLOGY | Facility: HOSPITAL | Age: 79
End: 2024-10-03
Attending: HOSPITALIST
Payer: MEDICARE

## 2024-10-03 LAB
ANION GAP SERPL CALC-SCNC: 13 MMOL/L (ref 0–18)
ANTIBODY SCREEN: NEGATIVE
BUN BLD-MCNC: 48 MG/DL (ref 9–23)
BUN/CREAT SERPL: 10.3 (ref 10–20)
CALCIUM BLD-MCNC: 10.4 MG/DL (ref 8.7–10.4)
CHLORIDE SERPL-SCNC: 100 MMOL/L (ref 98–112)
CO2 SERPL-SCNC: 27 MMOL/L (ref 21–32)
CREAT BLD-MCNC: 4.67 MG/DL
DEPRECATED RDW RBC AUTO: 52.9 FL (ref 35.1–46.3)
EGFRCR SERPLBLD CKD-EPI 2021: 12 ML/MIN/1.73M2 (ref 60–?)
ERYTHROCYTE [DISTWIDTH] IN BLOOD BY AUTOMATED COUNT: 16.2 % (ref 11–15)
GLUCOSE BLD-MCNC: 215 MG/DL (ref 70–99)
GLUCOSE BLDC GLUCOMTR-MCNC: 204 MG/DL (ref 70–99)
GLUCOSE BLDC GLUCOMTR-MCNC: 214 MG/DL (ref 70–99)
GLUCOSE BLDC GLUCOMTR-MCNC: 216 MG/DL (ref 70–99)
GLUCOSE BLDC GLUCOMTR-MCNC: 228 MG/DL (ref 70–99)
GLUCOSE BLDC GLUCOMTR-MCNC: 275 MG/DL (ref 70–99)
HCT VFR BLD AUTO: 31 %
HGB BLD-MCNC: 10.3 G/DL
MCH RBC QN AUTO: 29.9 PG (ref 26–34)
MCHC RBC AUTO-ENTMCNC: 33.2 G/DL (ref 31–37)
MCV RBC AUTO: 90.1 FL
OSMOLALITY SERPL CALC.SUM OF ELEC: 309 MOSM/KG (ref 275–295)
PLATELET # BLD AUTO: 176 10(3)UL (ref 150–450)
POTASSIUM SERPL-SCNC: 4 MMOL/L (ref 3.5–5.1)
RBC # BLD AUTO: 3.44 X10(6)UL
RH BLOOD TYPE: POSITIVE
SODIUM SERPL-SCNC: 140 MMOL/L (ref 136–145)
WBC # BLD AUTO: 8.5 X10(3) UL (ref 4–11)

## 2024-10-03 PROCEDURE — 74018 RADEX ABDOMEN 1 VIEW: CPT | Performed by: HOSPITALIST

## 2024-10-03 PROCEDURE — 71045 X-RAY EXAM CHEST 1 VIEW: CPT | Performed by: HOSPITALIST

## 2024-10-03 PROCEDURE — 99233 SBSQ HOSP IP/OBS HIGH 50: CPT | Performed by: HOSPITALIST

## 2024-10-03 PROCEDURE — 99232 SBSQ HOSP IP/OBS MODERATE 35: CPT | Performed by: INTERNAL MEDICINE

## 2024-10-03 RX ORDER — ALBUMIN (HUMAN) 12.5 G/50ML
25 SOLUTION INTRAVENOUS
Status: ACTIVE | OUTPATIENT
Start: 2024-10-03 | End: 2024-10-05

## 2024-10-03 NOTE — PROGRESS NOTES
[facility]  part of [facility]    Progress Note    Ollie Hernández Patient Status:  Inpatient    1945 MRN [id number]   Location [facility] 2W/SW Attending Radha Gabriel MD   Hosp Day # 5 PCP Wili Parmar MD       Subjective:   Ollie Hernández is a(n) 79 year old male who I am seeing for ESRD    Intubated/ more awake   No overnight issues  Not on any pressors    Family at bedside    Objective:   BP (!) 172/63 (BP Location: Left arm)   Pulse 74   Temp 96.9 °F (36.1 °C) (Temporal)   Resp 14   Wt 156 lb 4.9 oz (70.9 kg)   SpO2 100%   BMI 26.83 kg/m²      Intake/Output Summary (Last 24 hours) at 10/3/2024 0900  Last data filed at 10/3/2024 0710  Gross per 24 hour   Intake 1848.85 ml   Output 2075 ml   Net -226.15 ml     Wt Readings from Last 1 Encounters:   10/02/24 156 lb 4.9 oz (70.9 kg)       Exam  Vital signs: Blood pressure (!) 172/63, pulse 74, temperature 96.9 °F (36.1 °C), temperature source Temporal, resp. rate 14, weight 156 lb 4.9 oz (70.9 kg), SpO2 100%.    General: No acute distress.   HEENT: Moist mucous membranes. EOMI. ETT  Neck:  No JVD.   Respiratory: Clear to auscultation bilaterally.    Cardiovascular: S1, S2.  Regular rate and rhythm.   Abdomen: Soft, nontender, nondistended.    Neurologic: No focal neurological deficits.  Musculoskeletal: Full range of motion of all extremities.  No swelling noted.  Access:AVF    Results:     Recent Labs   Lab 24  0438 24  0427 24  1620 10/01/24  0330 10/02/24  0450 10/03/24  0421   RBC 4.33 3.24*  --  3.40* 3.06* 3.44*   HGB 13.1 10.0*   < > 10.5* 9.3* 10.3*   HCT 41.9 30.2*   < > 31.8* 27.5* 31.0*   MCV 96.8 93.2  --  93.5 89.9 90.1   MCH 30.3 30.9  --  30.9 30.4 29.9   MCHC 31.3 33.1  --  33.0 33.8 33.2   RDW 15.8* 16.1*  --  16.6* 16.4* 16.2*   NEPRELIM 16.42* 9.33*  --  8.99*  --   --    WBC 17.9* 11.2*  --  10.7 8.1 8.5   .0 141.0*  --  148.0* 159.0 176.0    < > = values in this interval  not displayed.         Recent Labs   Lab 10/01/24  0330 10/02/24  0437 10/03/24  0421   * 140* 215*   BUN 36* 61* 48*   CREATSERUM 3.98* 5.88* 4.67*   CA 9.8 9.9 10.4    137 140   K 3.5 3.4* 4.0   CL 99 98 100   CO2 29.0 24.0 27.0          XR CHEST AP PORTABLE  (CPT=71045)    Result Date: 10/3/2024  CONCLUSION:  1. Borderline cardiomegaly.  Tortuous atherosclerotic aorta. 2. Watchman device visualized left atrial appendage. 3. Prominent bibasilar pulmonary interstitium with slight progression. 4. Support tubes and catheters are satisfactory    Dictated by (CST): Juan Bolden MD on 10/03/2024 at 8:15 AM     Finalized by (CST): Juan Bolden MD on 10/03/2024 at 8:17 AM          XR CHEST AP PORTABLE  (CPT=71045)    Result Date: 10/2/2024  CONCLUSION:   Well-positioned endotracheal tube and enteric tube.  No significant change in the cardiopulmonary findings.    Dictated by (CST): Vernon Law MD on 10/02/2024 at 2:55 PM     Finalized by (CST): Vernon Law MD on 10/02/2024 at 2:58 PM          XR CHEST AP PORTABLE  (CPT=71045)    Result Date: 10/2/2024  CONCLUSION:   Interstitial pulmonary edema.  Improvement in alveolar opacities/edema compared to prior.  3.8 cm ovoid opacity over the left upper lung, increased from prior, likely artifactual/external to the patient.  Recommend attention on follow-up.    Dictated by (CST): Apple Pierre MD on 10/02/2024 at 7:43 AM     Finalized by (CST): Apple Pierre MD on 10/02/2024 at 7:46 AM           Assessment and Plan:     79 year old male with past medical history of T2DM, HTN, HLD, ESRD on HD MWF, CAD s/p PCI (5/2024), A.fib on Eliquis, HFpEF, KRAIG, BPH, history of recurrent gastrointestinal bleeding presented to the ER after a fall sustaining a left hip fracture    Impression:    ESRD, HD Monday Wednesday Friday  Hypoxic respiratory failure, possible aspiration; now intubated  Left hip fracture, Ortho following  Hypertension with  ESRD  Diabetes with nephropathy  A-fib s/p watchman  History of GI bleed  Anemia, hgb is stable  Secondary hyperparathyroidism, monitor calcium and phosphorus      Plan:    HD Monday Wednesday schedule  Plan for surgery tomorrow, coordinate time of dialysis with time of surgery    Thank you very much for allowing me to participate in the care of your patient.  If you have any questions, please do not hesitate to contact me.     Moni Pugh MD

## 2024-10-03 NOTE — PROGRESS NOTES
[facility]  part of [facility]    Progress Note    Ollie Hernández Patient Status:  Inpatient    1945 MRN [id number]   Location [facility] 2W/SW Attending Pranay Michel MD   Hosp Day # 5 PCP Wili Parmar MD     Chief Complaint:   Chief Complaint   Patient presents with    Fall       Subjective:   Ollie Hernández is intubated. Sedation on hold - he is awake follows simple commands.   Levophed paused.     No events overnght  Objective:   Objective:    Blood pressure (!) 172/63, pulse 78, temperature 96.9 °F (36.1 °C), temperature source Temporal, resp. rate 17, weight 156 lb 4.9 oz (70.9 kg), SpO2 100%.    Physical Exam:    General: intubated sedated.   Respiratory: Clear to auscultation bilaterally. No wheezes. No rhonchi.  Cardiovascular: S1, S2. Regular rate and rhythm. No murmurs, rubs or gallops.   Abdomen: Soft, nontender, distended. hypoactive bowel sounds. No rebound or guarding.  Neurologic: No focal neurological deficits.   Musculoskeletal: Moves all extremities.  Extremities: No edema.      Results:   Results:    Labs:  Recent Labs   Lab 24  0438 24  0427 24  1620 10/01/24  0330 10/02/24  0450 10/03/24  042   WBC 17.9* 11.2*  --  10.7 8.1 8.5   HGB 13.1 10.0* 9.9* 10.5* 9.3* 10.3*   MCV 96.8 93.2  --  93.5 89.9 90.1   .0 141.0*  --  148.0* 159.0 176.0       Recent Labs   Lab 24  1204 24  0438 24  0755 24  0427 10/01/24  0330 10/02/24  0437 10/03/24  042   *   < > 176*   < > 135* 140* 215*   BUN 59*   < > 62*   < > 36* 61* 48*   CREATSERUM 4.85*   < > 5.38*   < > 3.98* 5.88* 4.67*   CA 10.2   < > 9.5   < > 9.8 9.9 10.4   ALB 3.9  --  3.7  --  3.9  --   --       < > 140   < > 139 137 140   K 3.9   < > 4.4   < > 3.5 3.4* 4.0      < > 104   < > 99 98 100   CO2 31.0   < > 25.0   < > 29.0 24.0 27.0   ALKPHO 75  --  75  --  63  --   --    AST 30  --  23  --  22  --   --    ALT 17  --  13  --  9*  --    --    BILT 0.5  --  0.7  --  1.2*  --   --    TP 7.2  --  7.2  --  7.0  --   --     < > = values in this interval not displayed.       Estimated Creatinine Clearance: 11.1 mL/min (A) (based on SCr of 4.67 mg/dL (H)).    No results for input(s): \"PTP\", \"INR\" in the last 168 hours.         Culture:  Hospital Encounter on 09/28/24   1. Blood Culture     Status: None (Preliminary result)    Collection Time: 09/30/24 10:03 AM    Specimen: Bld,Arterial Line; Blood   Result Value Ref Range    Blood Culture Result No Growth 3 Days N/A       Cardiac  No results for input(s): \"TROP\", \"PBNP\" in the last 168 hours.      Imaging: Imaging data reviewed in [facility].  XR CHEST AP PORTABLE  (CPT=71045)    Result Date: 10/3/2024  CONCLUSION:  1. Borderline cardiomegaly.  Tortuous atherosclerotic aorta. 2. Watchman device visualized left atrial appendage. 3. Prominent bibasilar pulmonary interstitium with slight progression. 4. Support tubes and catheters are satisfactory    Dictated by (CST): Juan Bolden MD on 10/03/2024 at 8:15 AM     Finalized by (CST): Juan Bolden MD on 10/03/2024 at 8:17 AM          XR CHEST AP PORTABLE  (CPT=71045)    Result Date: 10/2/2024  CONCLUSION:   Well-positioned endotracheal tube and enteric tube.  No significant change in the cardiopulmonary findings.    Dictated by (CST): Vernon Law MD on 10/02/2024 at 2:55 PM     Finalized by (CST): Vernon Law MD on 10/02/2024 at 2:58 PM          XR CHEST AP PORTABLE  (CPT=71045)    Result Date: 10/2/2024  CONCLUSION:   Interstitial pulmonary edema.  Improvement in alveolar opacities/edema compared to prior.  3.8 cm ovoid opacity over the left upper lung, increased from prior, likely artifactual/external to the patient.  Recommend attention on follow-up.    Dictated by (CST): Apple Pierre MD on 10/02/2024 at 7:43 AM     Finalized by (CST): Apple Pierre MD on 10/02/2024 at 7:46 AM           Medications:    senna  10 mL Oral BID     docusate  100 mg Oral BID    mineral oil-white petrolatum  1 Application Both Eyes Nightly    [START ON 10/4/2024] ceFAZolin  2 g Intravenous 30 Min Pre-Op    [START ON 10/4/2024] tranexamic acid  1,000 mg Intravenous 30 Min Pre-Op    lidocaine-menthol  2 patch Transdermal Daily    heparin  5,000 Units Subcutaneous Q8H STEPHANIE    pantoprazole  40 mg Intravenous Q12H    piperacillin-tazobactam  3.375 g Intravenous Q12H    cetirizine  5 mg Per NG Tube Daily    carvedilol  25 mg Per NG Tube BID    atorvastatin  40 mg Per NG Tube Nightly    insulin aspart  1-7 Units Subcutaneous q6h    chlorhexidine gluconate  15 mL Mouth/Throat BID@0800,2000    fluticasone-salmeterol  1 puff Inhalation BID         Assessment and Plan:   Assessment & Plan:      Acute hypoxic respiratory failure   Hypotension   Aspiration PNA  Pulmonary and cardiology on consult.   Secondary to pulmonary edema and aspiration  Intubated 9/29 - cont full vent support.   Empiric zosyn.   Sputum cx normal julio c. Blood cx x 4 NGTD  Daily SBT.   Lactic acid normal. WBC trending down   Volume management per nephrology   CXR pulmonary edema. Improved alveolar opacities.   Wean pressors as able to keep MAP > 65 or sBP > 90   ESRD   HD per nephrology MWF  Nephro on consult.   Hip fracture   S/p fall PTA   Orthopedic surgery on consult.   Plans for surgery Friday   High risk for surgery  however surgery is necessary.   DM II   A1c   ISS   Paroxysmal A.fib - currently NSR   Acute on chronic HFpEF  CAD s/p jodi circ 5/24'   Elevate troponin secondary to demand ischemia   S/p watchman device 9/11/24  Cont coreg, statin  No further cardiac testing prior to planned surgery.   Resume ASA/Plavix once safely able to do so.   Coffee ground emesis   Resolved.   IV protonix BID        >55min spent, >50% spent counseling and coordinating care in the form of educating pt/family and d/w consultants and staff. Most of the time spent discussing the above plan.        Plan of care  discussed with patient or family at bedside.    Pranay Michel MD  Hospitalist          Supplementary Documentation:     Quality:  DVT Prophylaxis: heparin   CODE status: Full  Dispo: per clinical course            Estimated date of discharge: TBD  Discharge is dependent on: clinical stability  At this point Mr. Hernández is expected to be discharge to: TBD

## 2024-10-03 NOTE — PLAN OF CARE
Patient is intubated on precedex and fentanyl gtt. OG to tube feeds. No bowel movement or urine output overnight. CHG bath done. PRN tylenol and fentanyl bolus given. Potassium replaced per nephrology order. PRN hydralazine given. Bed is locked and in lowest position. Daughter Jaimee was updated on plan of care.   Problem: Patient Centered Care  Goal: Patient preferences are identified and integrated in the patient's plan of care  Description: Interventions:  - What would you like us to know as we care for you?   - Provide timely, complete, and accurate information to patient/family  - Incorporate patient and family knowledge, values, beliefs, and cultural backgrounds into the planning and delivery of care  - Encourage patient/family to participate in care and decision-making at the level they choose  - Honor patient and family perspectives and choices  Outcome: Progressing     Problem: Diabetes/Glucose Control  Goal: Glucose maintained within prescribed range  Description: INTERVENTIONS:  - Monitor Blood Glucose as ordered  - Assess for signs and symptoms of hyperglycemia and hypoglycemia  - Administer ordered medications to maintain glucose within target range  - Assess barriers to adequate nutritional intake and initiate nutrition consult as needed  - Instruct patient on self management of diabetes  Outcome: Progressing     Problem: Patient/Family Goals  Goal: Patient/Family Long Term Goal  Description: Patient's Long Term Goal: to go home    Interventions:  - follow MD order  - See additional Care Plan goals for specific interventions  Outcome: Progressing  Goal: Patient/Family Short Term Goal  Description: Patient's Short Term Goal: to be pain free    Interventions:   - follow medication regimen  - See additional Care Plan goals for specific interventions  Outcome: Progressing     Problem: PAIN - ADULT  Goal: Verbalizes/displays adequate comfort level or patient's stated pain goal  Description:  INTERVENTIONS:  - Encourage pt to monitor pain and request assistance  - Assess pain using appropriate pain scale  - Administer analgesics based on type and severity of pain and evaluate response  - Implement non-pharmacological measures as appropriate and evaluate response  - Consider cultural and social influences on pain and pain management  - Manage/alleviate anxiety  - Utilize distraction and/or relaxation techniques  - Monitor for opioid side effects  - Notify MD/LIP if interventions unsuccessful or patient reports new pain  - Anticipate increased pain with activity and pre-medicate as appropriate  Outcome: Progressing     Problem: SAFETY ADULT - FALL  Goal: Free from fall injury  Description: INTERVENTIONS:  - Assess pt frequently for physical needs  - Identify cognitive and physical deficits and behaviors that affect risk of falls.  - [place] fall precautions as indicated by assessment.  - Educate pt/family on patient safety including physical limitations  - Instruct pt to call for assistance with activity based on assessment  - Modify environment to reduce risk of injury  - Provide assistive devices as appropriate  - Consider OT/PT consult to assist with strengthening/mobility  - Encourage toileting schedule  Outcome: Progressing     Problem: DISCHARGE PLANNING  Goal: Discharge to home or other facility with appropriate resources  Description: INTERVENTIONS:  - Identify barriers to discharge w/pt and caregiver  - Include patient/family/discharge partner in discharge planning  - Arrange for needed discharge resources and transportation as appropriate  - Identify discharge learning needs (meds, wound care, etc)  - Arrange for interpreters to assist at discharge as needed  - Consider post-discharge preferences of patient/family/discharge partner  - Complete POLST form as appropriate  - Assess patient's ability to be responsible for managing their own health  - Refer to Case Management Department for  coordinating discharge planning if the patient needs post-hospital services based on physician/LIP order or complex needs related to functional status, cognitive ability or social support system  Outcome: Progressing     Problem: Safety Risk - Non-Violent Restraints  Goal: Patient will remain free from self-harm  Description: INTERVENTIONS:  - Apply the least restrictive restraint to prevent harm  - Notify patient and family of reasons restraints applied  - Assess for any contributing factors to confusion (electrolyte disturbances, delirium, medications)  - Discontinue any unnecessary medical devices as soon as possible  - Assess the patient's physical comfort, circulation, skin condition, hydration, nutrition and elimination needs   - Reorient and redirection as needed  - Assess for the need to continue restraints  10/3/2024 0337 by Josie Jones RN  Outcome: Progressing  10/3/2024 0337 by Josie Jones RN  Outcome: Progressing     Problem: CARDIOVASCULAR - ADULT  Goal: Maintains optimal cardiac output and hemodynamic stability  Description: INTERVENTIONS:  - Monitor vital signs, rhythm, and trends  - Monitor for bleeding, hypotension and signs of decreased cardiac output  - Evaluate effectiveness of vasoactive medications to optimize hemodynamic stability  - Monitor arterial and/or venous puncture sites for bleeding and/or hematoma  - Assess quality of pulses, skin color and temperature  - Assess for signs of decreased coronary artery perfusion - ex. Angina  - Evaluate fluid balance, assess for edema, trend weights  Outcome: Progressing  Goal: Absence of cardiac arrhythmias or at baseline  Description: INTERVENTIONS:  - Continuous cardiac monitoring, monitor vital signs, obtain 12 lead EKG if indicated  - Evaluate effectiveness of antiarrhythmic and heart rate control medications as ordered  - Initiate emergency measures for life threatening arrhythmias  - Monitor electrolytes and administer  replacement therapy as ordered  Outcome: Progressing     Problem: RESPIRATORY - ADULT  Goal: Achieves optimal ventilation and oxygenation  Description: INTERVENTIONS:  - Assess for changes in respiratory status  - Assess for changes in mentation and behavior  - Position to facilitate oxygenation and minimize respiratory effort  - Oxygen supplementation based on oxygen saturation or ABGs  - Provide Smoking Cessation handout, if applicable  - Encourage broncho-pulmonary hygiene including cough, deep breathe, Incentive Spirometry  - Assess the need for suctioning and perform as needed  - Assess and instruct to report SOB or any respiratory difficulty  - Respiratory Therapy support as indicated  - Manage/alleviate anxiety  - Monitor for signs/symptoms of CO2 retention  Outcome: Progressing

## 2024-10-03 NOTE — PROGRESS NOTES
Critical Care Progress Note     Assessment / Plan:  Acute respiratory failure - due to pulmonary edema and/or aspiration. Intubated 9/29. CXR with interval improvement  - continue full vent support  - daily SBT; keep intubated as surgery is planned for tomorrow  - minimize sedation as able  - empiric Zosyn (9/29- )  - volume management with HD  ESRD   - HD per nephrology  KRAIG  - on CPAP at home  Asthma  - Advair in lieu of Symbicort once extubated  Coffee ground output from OGT - resolved  - IV Protonix BID  Anemia - due to above  - transfuse to keep hgb >7  Hip fracture after fall  - surgery is planned for tomorrow; patient is high risk from pulmonary perspective for surgery  DM  - SSI  FEN  - TFs  Ppx  - subcu heparin  - PPI  Dispo  - ICU    Discussed with family at bedside    Critical care time: 35 minutes      Subjective:  No events overnight    Objective:  Vitals:    10/03/24 0510 10/03/24 0600 10/03/24 0700 10/03/24 0800   BP:       BP Location:       Pulse: 77 78 74    Resp: 14 13 14    Temp:    96.9 °F (36.1 °C)   TempSrc:    Temporal   SpO2: 100% 100% 100%    Weight:         Physical Exam:  General: intubated  Skin: no rash, ulcers or subcutaneous nodules  Eyes: anicteric sclerae, moist conjunctivae  Head, ears, nose, throat: atraumatic, oropharynx clear with moist mucous membranes  Neck: trachea midline with no thyromegaly  Heart: regular rate and rhythm, no murmurs / rubs / gallops  Lungs: clear bilaterally  Abdomen: soft, nontender, nondistended   Extremities: no edema or cyanosis  Psych: alert    Medications:  Reviewed in EMR    Lab Data:  Reviewed in EMR    Imaging:  I independently visualized all relevant chest imaging in PACS and agree with radiology interpretation except where noted.

## 2024-10-03 NOTE — PROGRESS NOTES
10/03/24 0306   Vent Information   Vent Mode VC/AC   Settings   FiO2 (%) 30 %   Resp Rate (Set) 14   Vt (Set, mL) 500 mL   Waveform Decelerating ramp   PEEP/CPAP (cm H2O) 5 cm H20     Received patient intubated/mechanically ventilated on full support. Suction provided as needed. RT will continue to monitor.

## 2024-10-03 NOTE — PROGRESS NOTES
[facility]  part of [facility]    Cardiology Progress Note    Ollie Hernández Patient Status:  Inpatient    1945 MRN [id number]   Location [facility] 2W/SW Attending Pranay Michel MD   Hosp Day # 5 PCP Wili Parmar MD       Impression/Plan:  79 year old male presenting with:    Hip fracture  Acute resp failure, aspiration and/or pulmonary edema; intubated 2024  ESRD on HD  DM  PAF, currently SR. S/p watchman device 24  HTN  HL, on statin PTA  Acute on chronic diastolic CHF  CAD s/p Spencer circ 24 ]  Coffee grounds in OG tube  Elevated troponin, likely demand ischemia    - Cont carvedilol for rate control  - Cont statin  - Asa/plavix on hold (recent Watchman implant 2024); resume as safely able.  If prolonged hold anticipated consider heparin gtt  - Patient inherently high risk for adverse angelica-op events given acute resp failure, CAD and AF; however, currently rate controlled, TERRI 2024 with normal LV function and s/p revasc 2024.  No further cardiac testing needed prior to planned ortho procedures. Surgery for Friday  - HD as per nephrology  - Vent management as per pulm  - Monitor on tele  - Will follow     Subjective:   Intubated, sedated.        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)    Atherosclerosis of native arteries of extremities with intermittent claudication, bilateral legs (HCC)    Primary hypertension    Smokers' cough (HCC)     Unstable angina (HCC)    Lower GI bleed    ESRD (end stage renal disease) on dialysis (HCC)    PAF (paroxysmal atrial fibrillation) ([facility])    AVM (arteriovenous malformation) of colon    ABLA (acute blood loss anemia)    Rectal bleeding    Anticoagulated    Antiplatelet or antithrombotic long-term use    Lower GI bleeding    ESRD on hemodialysis (HCC)    GI bleed    Closed displaced midcervical fracture of left femur with delayed healing    ESRD (end stage renal disease) ([facility])    Closed fracture of left hip ([facility])       Objective:   Temp: 97.6 °F (36.4 °C)  Pulse: 78  Resp: 13  BP: 172/63  AO: 122/39  FiO2 (%): 30 %    Intake/Output:     Intake/Output Summary (Last 24 hours) at 10/3/2024 0639  Last data filed at 10/3/2024 0600  Gross per 24 hour   Intake 1838.6 ml   Output 2075 ml   Net -236.4 ml       Last 3 Weights   10/02/24 1310 156 lb 4.9 oz (70.9 kg)   10/02/24 0800 143 lb 11.8 oz (65.2 kg)   10/01/24 0600 160 lb 0.9 oz (72.6 kg)   09/30/24 0400 164 lb 0.4 oz (74.4 kg)   09/28/24 1649 155 lb 11.2 oz (70.6 kg)   08/22/24 1311 159 lb 12.8 oz (72.5 kg)   08/20/24 0933 158 lb (71.7 kg)       Tele: NSR    Physical Exam:    General: Intubated, sedated  HEENT: Normocephalic, anicteric sclera  Neck: supple  Cardiac: Regular rate and rhythm. S1, S2 normal. No murmur, pericardial rub, S3, thrill, heave or extra cardiac sounds.  Lungs: Coarse breath sounds bilat  Abdomen: Soft, non-distended  Extremities: Without clubbing, cyanosis or edema.  Peripheral pulses are 2+.  Neurologic: Intubated, sedated  Skin: Warm and dry.     Laboratory/Data:    Labs:         Recent Labs   Lab 09/29/24  0438 09/30/24  0427 09/30/24  1620 10/01/24  0330 10/02/24  0450 10/03/24  0421   WBC 17.9* 11.2*  --  10.7 8.1 8.5   HGB 13.1 10.0* 9.9* 10.5* 9.3* 10.3*   MCV 96.8 93.2  --  93.5 89.9 90.1   .0 141.0*  --  148.0* 159.0 176.0       Recent Labs   Lab 09/29/24  0438 09/29/24  0755 09/30/24  0427 10/01/24  0330 10/02/24  0437  10/03/24  0421    140 139 139 137 140   K 4.3 4.4 3.5 3.5 3.4* 4.0    104 98 99 98 100   CO2 26.0 25.0 29.0 29.0 24.0 27.0   BUN 59* 62* 55* 36* 61* 48*   CREATSERUM 5.55* 5.38* 4.88* 3.98* 5.88* 4.67*   CA 9.5 9.5 9.9 9.8 9.9 10.4   MG 1.8  --  1.8  --  2.2  --    PHOS 6.4*  --  5.6*  --   --   --    * 176* 117* 135* 140* 215*       Recent Labs   Lab 09/28/24  1204 09/29/24  0755 10/01/24  0330   ALT 17 13 9*   AST 30 23 22   ALB 3.9 3.7 3.9       No results for input(s): \"TROP\" in the last 168 hours.    Allergies:   Allergies   Allergen Reactions    Adhesive Tape OTHER (SEE COMMENTS)     Severe rashes    Dust Mite Extract RASH       Medications:  Current Facility-Administered Medications   Medication Dose Route Frequency    senna (Senokot) 8.8 MG/5ML oral syrup 17.6 mg  10 mL Oral BID    docusate (Colace) 50 MG/5ML oral solution 100 mg  100 mg Oral BID    mineral oil-white petrolatum (Artificial Tears) 83-15 % ophthalmic ointment 1 Application  1 Application Both Eyes Nightly    norepinephrine (Levophed) 4 mg/250mL infusion premix  0.5-30 mcg/min Intravenous Continuous    [START ON 10/4/2024] ceFAZolin (Ancef) 2g in 10mL IV syringe premix  2 g Intravenous 30 Min Pre-Op    [START ON 10/4/2024] tranexamic acid in sodium chloride 0.7% (Cyklokapron) 1000 mg/100mL infusion premix 1,000 mg  1,000 mg Intravenous 30 Min Pre-Op    sodium chloride 0.9 % IV bolus 100 mL  100 mL Intravenous Q30 Min PRN    And    albumin human (Albumin) 25% injection 25 g  25 g Intravenous PRN Dialysis    lidocaine-menthol 4-1 % patch 2 patch  2 patch Transdermal Daily    dexmedeTOMIDine in sodium chloride 0.9% (Precedex) 400 mcg/100mL infusion premix  0.2-1.5 mcg/kg/hr (Dosing Weight) Intravenous Continuous    heparin (Porcine) 5000 UNIT/ML injection 5,000 Units  5,000 Units Subcutaneous Q8H STEPHANIE    fentaNYL (Sublimaze) 25 mcg BOLUS FROM BAG infusion  25 mcg Intravenous PRN    pantoprazole (Protonix) 40 mg in sodium chloride  0.9% PF 10 mL IV push  40 mg Intravenous Q12H    piperacillin-tazobactam (Zosyn) 3.375 g in dextrose 5% 100 mL IVPB-ADDV  3.375 g Intravenous Q12H    hydrALAzine (Apresoline) 20 mg/mL injection 10 mg  10 mg Intravenous Q6H PRN    labetalol (Trandate) 5 mg/mL injection 10 mg  10 mg Intravenous Q4H PRN    cetirizine (ZyrTEC) tab 5 mg  5 mg Per NG Tube Daily    carvedilol (Coreg) tab 25 mg  25 mg Per NG Tube BID    atorvastatin (Lipitor) tab 40 mg  40 mg Per NG Tube Nightly    acetaminophen (Tylenol) 160 MG/5ML oral liquid 500 mg  500 mg Per NG Tube Daily PRN    acetaminophen (Tylenol) tab 650 mg  650 mg Oral Q4H PRN    Or    acetaminophen (Tylenol) 160 MG/5ML oral liquid 650 mg  650 mg Per NG Tube Q4H PRN    Or    acetaminophen (Tylenol) rectal suppository 650 mg  650 mg Rectal Q4H PRN    Or    acetaminophen (Ofirmev) 10 mg/mL infusion premix 1,000 mg  1,000 mg Intravenous Q6H PRN    polyethylene glycol (PEG 3350) (Miralax) 17 g oral packet 17 g  17 g Per NG Tube Daily PRN    insulin aspart (NovoLOG) 100 Units/mL FlexPen 1-7 Units  1-7 Units Subcutaneous q6h    heparin (Porcine) 1000 UNIT/ML injection 1,500 Units  1.5 mL Intravenous PRN Dialysis    lidocaine-prilocaine (Emla) 2.5-2.5 % cream   Topical PRN    bisacodyl (Dulcolax) 10 MG rectal suppository 10 mg  10 mg Rectal Daily PRN    chlorhexidine gluconate (Peridex) 0.12 % oral solution 15 mL  15 mL Mouth/Throat BID@0800,2000    propofol (Diprivan) 10 mg/mL infusion premix  5-50 mcg/kg/min (Dosing Weight) Intravenous Continuous    fentaNYL in sodium chloride 0.9% (Sublimaze) 1000 mcg/100mL infusion premix   mcg/hr Intravenous Continuous PRN    albuterol (Ventolin HFA) 108 (90 Base) MCG/ACT inhaler 2 puff  2 puff Inhalation Q4H PRN    fluticasone propionate (Flonase) 50 MCG/ACT nasal suspension 2 spray  2 spray Nasal Daily PRN    glucose (Dex4) 15 GM/59ML oral liquid 15 g  15 g Oral Q15 Min PRN    Or    glucose (Glutose) 40% oral gel 15 g  15 g Oral Q15 Min  PRN    Or    glucose-vitamin C (Dex-4) chewable tab 4 tablet  4 tablet Oral Q15 Min PRN    Or    dextrose 50% injection 50 mL  50 mL Intravenous Q15 Min PRN    Or    glucose (Dex4) 15 GM/59ML oral liquid 30 g  30 g Oral Q15 Min PRN    Or    glucose (Glutose) 40% oral gel 30 g  30 g Oral Q15 Min PRN    Or    glucose-vitamin C (Dex-4) chewable tab 8 tablet  8 tablet Oral Q15 Min PRN    ipratropium-albuterol (Duoneb) 0.5-2.5 (3) MG/3ML inhalation solution 3 mL  3 mL Nebulization Q6H PRN    fluticasone-salmeterol (Advair Diskus) 250-50 MCG/ACT inhaler 1 puff  1 puff Inhalation BID

## 2024-10-03 NOTE — PROGRESS NOTES
Planning surgery for Friday 10/4/2024 morning. Appreciate thorough notes from Cardiology and Pulmonary in relation to surgery. Agree patient is at high risk but surgery is necessary.

## 2024-10-03 NOTE — PLAN OF CARE
Problem: RESPIRATORY - ADULT  Goal: Achieves optimal ventilation and oxygenation  Description: INTERVENTIONS:  - Assess for changes in respiratory status  - Assess for changes in mentation and behavior  - Position to facilitate oxygenation and minimize respiratory effort  - Oxygen supplementation based on oxygen saturation or ABGs  - Provide Smoking Cessation handout, if applicable  - Encourage broncho-pulmonary hygiene including cough, deep breathe, Incentive Spirometry  - Assess the need for suctioning and perform as needed  - Assess and instruct to report SOB or any respiratory difficulty  - Respiratory Therapy support as indicated  - Manage/alleviate anxiety  - Monitor for signs/symptoms of CO2 retention  Outcome: Progressing   Patient received intubated and on ventilator A/C 14/500/+5/30%. Bilateral breath sounds auscultated. Suction provided when indicated. SBT initiated at 0925, see documentation below. Extubation held as patient has surgery scheduled for tomorrow. No acute events during the daytime. RT will continue to monitor.    SBT: PASS/FAIL? (Pass)  Start time:  0925  Settings:  Pressure Support: 5    CPAP: 5    FiO2:  30%  Reason for Failure if present: N/A    Weaning Parameters:  RR: 24  RSBI: 75  NIF: -37  VT: 310  VC: 1.48L       Returned to Full support: (Time:0955)  MD notified: (Physician: Leti)  Current Ventilator Settings:   - Mode: A/C   - Rate: 14   - Tidal Volume:500   - FiO2: 30   - PEEP 5

## 2024-10-04 ENCOUNTER — ANESTHESIA (OUTPATIENT)
Dept: SURGERY | Facility: HOSPITAL | Age: 79
End: 2024-10-04
Payer: MEDICARE

## 2024-10-04 ENCOUNTER — APPOINTMENT (OUTPATIENT)
Dept: GENERAL RADIOLOGY | Facility: HOSPITAL | Age: 79
End: 2024-10-04
Attending: ORTHOPAEDIC SURGERY
Payer: MEDICARE

## 2024-10-04 ENCOUNTER — ANESTHESIA EVENT (OUTPATIENT)
Dept: SURGERY | Facility: HOSPITAL | Age: 79
End: 2024-10-04
Payer: MEDICARE

## 2024-10-04 LAB
ANION GAP SERPL CALC-SCNC: 13 MMOL/L (ref 0–18)
BASOPHILS # BLD AUTO: 0.04 X10(3) UL (ref 0–0.2)
BASOPHILS NFR BLD AUTO: 0.5 %
BUN BLD-MCNC: 70 MG/DL (ref 9–23)
BUN/CREAT SERPL: 11.6 (ref 10–20)
CALCIUM BLD-MCNC: 10.1 MG/DL (ref 8.7–10.4)
CHLORIDE SERPL-SCNC: 99 MMOL/L (ref 98–112)
CO2 SERPL-SCNC: 25 MMOL/L (ref 21–32)
CREAT BLD-MCNC: 6.05 MG/DL
DEPRECATED RDW RBC AUTO: 56.1 FL (ref 35.1–46.3)
EGFRCR SERPLBLD CKD-EPI 2021: 9 ML/MIN/1.73M2 (ref 60–?)
EOSINOPHIL # BLD AUTO: 0.43 X10(3) UL (ref 0–0.7)
EOSINOPHIL NFR BLD AUTO: 5.2 %
ERYTHROCYTE [DISTWIDTH] IN BLOOD BY AUTOMATED COUNT: 16.3 % (ref 11–15)
GLUCOSE BLD-MCNC: 261 MG/DL (ref 70–99)
GLUCOSE BLDC GLUCOMTR-MCNC: 170 MG/DL (ref 70–99)
GLUCOSE BLDC GLUCOMTR-MCNC: 206 MG/DL (ref 70–99)
GLUCOSE BLDC GLUCOMTR-MCNC: 207 MG/DL (ref 70–99)
GLUCOSE BLDC GLUCOMTR-MCNC: 223 MG/DL (ref 70–99)
GLUCOSE BLDC GLUCOMTR-MCNC: 242 MG/DL (ref 70–99)
GLUCOSE BLDC GLUCOMTR-MCNC: 294 MG/DL (ref 70–99)
HCT VFR BLD AUTO: 28 %
HCT VFR BLD AUTO: 30.4 %
HGB BLD-MCNC: 9.1 G/DL
HGB BLD-MCNC: 9.9 G/DL
IMM GRANULOCYTES # BLD AUTO: 0.08 X10(3) UL (ref 0–1)
IMM GRANULOCYTES NFR BLD: 1 %
LYMPHOCYTES # BLD AUTO: 0.67 X10(3) UL (ref 1–4)
LYMPHOCYTES NFR BLD AUTO: 8.1 %
MCH RBC QN AUTO: 30.1 PG (ref 26–34)
MCHC RBC AUTO-ENTMCNC: 32.6 G/DL (ref 31–37)
MCV RBC AUTO: 92.4 FL
MONOCYTES # BLD AUTO: 0.54 X10(3) UL (ref 0.1–1)
MONOCYTES NFR BLD AUTO: 6.5 %
NEUTROPHILS # BLD AUTO: 6.51 X10 (3) UL (ref 1.5–7.7)
NEUTROPHILS # BLD AUTO: 6.51 X10(3) UL (ref 1.5–7.7)
NEUTROPHILS NFR BLD AUTO: 78.7 %
OSMOLALITY SERPL CALC.SUM OF ELEC: 314 MOSM/KG (ref 275–295)
PLATELET # BLD AUTO: 182 10(3)UL (ref 150–450)
POTASSIUM SERPL-SCNC: 3.4 MMOL/L (ref 3.5–5.1)
RBC # BLD AUTO: 3.29 X10(6)UL
SODIUM SERPL-SCNC: 137 MMOL/L (ref 136–145)
TRIGL SERPL-MCNC: 160 MG/DL (ref 30–149)
WBC # BLD AUTO: 8.3 X10(3) UL (ref 4–11)

## 2024-10-04 PROCEDURE — 3077F SYST BP >= 140 MM HG: CPT | Performed by: INTERNAL MEDICINE

## 2024-10-04 PROCEDURE — 0SRB049 REPLACEMENT OF LEFT HIP JOINT WITH CERAMIC ON POLYETHYLENE SYNTHETIC SUBSTITUTE, CEMENTED, OPEN APPROACH: ICD-10-PCS | Performed by: ORTHOPAEDIC SURGERY

## 2024-10-04 PROCEDURE — 99233 SBSQ HOSP IP/OBS HIGH 50: CPT | Performed by: INTERNAL MEDICINE

## 2024-10-04 PROCEDURE — 1159F MED LIST DOCD IN RCRD: CPT | Performed by: INTERNAL MEDICINE

## 2024-10-04 PROCEDURE — 73501 X-RAY EXAM HIP UNI 1 VIEW: CPT | Performed by: ORTHOPAEDIC SURGERY

## 2024-10-04 PROCEDURE — 3008F BODY MASS INDEX DOCD: CPT | Performed by: INTERNAL MEDICINE

## 2024-10-04 PROCEDURE — 76000 FLUOROSCOPY <1 HR PHYS/QHP: CPT | Performed by: ORTHOPAEDIC SURGERY

## 2024-10-04 PROCEDURE — 99232 SBSQ HOSP IP/OBS MODERATE 35: CPT | Performed by: INTERNAL MEDICINE

## 2024-10-04 PROCEDURE — 3078F DIAST BP <80 MM HG: CPT | Performed by: INTERNAL MEDICINE

## 2024-10-04 DEVICE — BIOLOX® DELTA, CERAMIC FEMORAL HEAD, S, Ø 28/-3.5, TAPER 12/14
Type: IMPLANTABLE DEVICE | Site: HIP | Status: FUNCTIONAL
Brand: BIOLOX® DELTA

## 2024-10-04 DEVICE — IMPLANTABLE DEVICE
Type: IMPLANTABLE DEVICE | Site: HIP | Status: FUNCTIONAL
Brand: G7® ACETABULAR SYSTEM

## 2024-10-04 DEVICE — IMPLANTABLE DEVICE
Type: IMPLANTABLE DEVICE | Site: HIP | Status: FUNCTIONAL
Brand: REFOBACIN® BONE CEMENT R

## 2024-10-04 DEVICE — IMPLANTABLE DEVICE
Type: IMPLANTABLE DEVICE | Site: HIP | Status: FUNCTIONAL
Brand: AVENIR® MÜLLER

## 2024-10-04 DEVICE — IMPLANTABLE DEVICE
Type: IMPLANTABLE DEVICE | Site: HIP | Status: FUNCTIONAL
Brand: G7® DUAL MOBILITY ACETABULAR SYSTEM

## 2024-10-04 DEVICE — IMPLANTABLE DEVICE
Type: IMPLANTABLE DEVICE | Site: HIP | Status: FUNCTIONAL
Brand: VIVACIT-E®

## 2024-10-04 RX ORDER — HALOPERIDOL 5 MG/ML
0.25 INJECTION INTRAMUSCULAR ONCE AS NEEDED
Status: ACTIVE | OUTPATIENT
Start: 2024-10-04 | End: 2024-10-04

## 2024-10-04 RX ORDER — ESMOLOL HYDROCHLORIDE 10 MG/ML
INJECTION INTRAVENOUS AS NEEDED
Status: DISCONTINUED | OUTPATIENT
Start: 2024-10-04 | End: 2024-10-04 | Stop reason: SURG

## 2024-10-04 RX ORDER — POTASSIUM CHLORIDE 1.5 G/1.58G
10 POWDER, FOR SOLUTION ORAL ONCE
Status: COMPLETED | OUTPATIENT
Start: 2024-10-04 | End: 2024-10-04

## 2024-10-04 RX ORDER — MIDAZOLAM HYDROCHLORIDE 1 MG/ML
INJECTION INTRAMUSCULAR; INTRAVENOUS AS NEEDED
Status: DISCONTINUED | OUTPATIENT
Start: 2024-10-04 | End: 2024-10-04 | Stop reason: SURG

## 2024-10-04 RX ORDER — HEPARIN SODIUM 5000 [USP'U]/ML
5000 INJECTION, SOLUTION INTRAVENOUS; SUBCUTANEOUS EVERY 8 HOURS SCHEDULED
Status: DISCONTINUED | OUTPATIENT
Start: 2024-10-04 | End: 2024-10-22

## 2024-10-04 RX ORDER — METOCLOPRAMIDE HYDROCHLORIDE 5 MG/ML
10 INJECTION INTRAMUSCULAR; INTRAVENOUS EVERY 8 HOURS PRN
Status: DISCONTINUED | OUTPATIENT
Start: 2024-10-04 | End: 2024-10-05

## 2024-10-04 RX ORDER — DEXTROSE MONOHYDRATE 100 MG/ML
INJECTION, SOLUTION INTRAVENOUS CONTINUOUS PRN
Status: DISCONTINUED | OUTPATIENT
Start: 2024-10-04 | End: 2024-10-04 | Stop reason: SURG

## 2024-10-04 RX ORDER — SODIUM CHLORIDE 9 MG/ML
INJECTION, SOLUTION INTRAVENOUS CONTINUOUS PRN
Status: DISCONTINUED | OUTPATIENT
Start: 2024-10-04 | End: 2024-10-04 | Stop reason: SURG

## 2024-10-04 RX ORDER — ONDANSETRON 2 MG/ML
INJECTION INTRAMUSCULAR; INTRAVENOUS AS NEEDED
Status: DISCONTINUED | OUTPATIENT
Start: 2024-10-04 | End: 2024-10-04 | Stop reason: SURG

## 2024-10-04 RX ORDER — ONDANSETRON 2 MG/ML
4 INJECTION INTRAMUSCULAR; INTRAVENOUS ONCE AS NEEDED
Status: ACTIVE | OUTPATIENT
Start: 2024-10-04 | End: 2024-10-04

## 2024-10-04 RX ORDER — MAGNESIUM HYDROXIDE 1200 MG/15ML
LIQUID ORAL CONTINUOUS PRN
Status: DISCONTINUED | OUTPATIENT
Start: 2024-10-04 | End: 2024-10-19 | Stop reason: ALTCHOICE

## 2024-10-04 RX ORDER — PROCHLORPERAZINE EDISYLATE 5 MG/ML
5 INJECTION INTRAMUSCULAR; INTRAVENOUS ONCE AS NEEDED
Status: ACTIVE | OUTPATIENT
Start: 2024-10-04 | End: 2024-10-04

## 2024-10-04 RX ORDER — SODIUM CHLORIDE 9 MG/ML
INJECTION, SOLUTION INTRAVENOUS CONTINUOUS
Status: DISCONTINUED | OUTPATIENT
Start: 2024-10-04 | End: 2024-10-04

## 2024-10-04 RX ORDER — ENOXAPARIN SODIUM 100 MG/ML
40 INJECTION SUBCUTANEOUS DAILY
Status: DISCONTINUED | OUTPATIENT
Start: 2024-10-05 | End: 2024-10-04 | Stop reason: ALTCHOICE

## 2024-10-04 RX ORDER — SODIUM BICARBONATE 650 MG/1
325 TABLET ORAL AS NEEDED
Status: DISCONTINUED | OUTPATIENT
Start: 2024-10-04 | End: 2024-10-19

## 2024-10-04 RX ORDER — DIPHENHYDRAMINE HYDROCHLORIDE 50 MG/ML
12.5 INJECTION INTRAMUSCULAR; INTRAVENOUS EVERY 4 HOURS PRN
Status: DISCONTINUED | OUTPATIENT
Start: 2024-10-04 | End: 2024-10-22

## 2024-10-04 RX ORDER — NALOXONE HYDROCHLORIDE 0.4 MG/ML
0.08 INJECTION, SOLUTION INTRAMUSCULAR; INTRAVENOUS; SUBCUTANEOUS AS NEEDED
Status: ACTIVE | OUTPATIENT
Start: 2024-10-04 | End: 2024-10-04

## 2024-10-04 RX ORDER — HYDROMORPHONE HYDROCHLORIDE 1 MG/ML
0.2 INJECTION, SOLUTION INTRAMUSCULAR; INTRAVENOUS; SUBCUTANEOUS
Status: ACTIVE | OUTPATIENT
Start: 2024-10-04 | End: 2024-10-04

## 2024-10-04 RX ORDER — DIPHENHYDRAMINE HCL 25 MG
25 CAPSULE ORAL EVERY 4 HOURS PRN
Status: DISCONTINUED | OUTPATIENT
Start: 2024-10-04 | End: 2024-10-22

## 2024-10-04 RX ORDER — ONDANSETRON 2 MG/ML
4 INJECTION INTRAMUSCULAR; INTRAVENOUS EVERY 6 HOURS PRN
Status: DISCONTINUED | OUTPATIENT
Start: 2024-10-04 | End: 2024-10-21

## 2024-10-04 RX ORDER — DIPHENHYDRAMINE HYDROCHLORIDE 50 MG/ML
25 INJECTION INTRAMUSCULAR; INTRAVENOUS ONCE AS NEEDED
Status: ACTIVE | OUTPATIENT
Start: 2024-10-04 | End: 2024-10-04

## 2024-10-04 RX ORDER — SENNOSIDES 8.6 MG
17.2 TABLET ORAL NIGHTLY
Status: DISCONTINUED | OUTPATIENT
Start: 2024-10-04 | End: 2024-10-04

## 2024-10-04 RX ORDER — ROCURONIUM BROMIDE 10 MG/ML
INJECTION, SOLUTION INTRAVENOUS AS NEEDED
Status: DISCONTINUED | OUTPATIENT
Start: 2024-10-04 | End: 2024-10-04 | Stop reason: SURG

## 2024-10-04 RX ADMIN — SODIUM CHLORIDE: 9 INJECTION, SOLUTION INTRAVENOUS at 11:04:00

## 2024-10-04 RX ADMIN — SODIUM CHLORIDE: 9 INJECTION, SOLUTION INTRAVENOUS at 08:33:00

## 2024-10-04 RX ADMIN — SODIUM CHLORIDE: 9 INJECTION, SOLUTION INTRAVENOUS at 12:05:00

## 2024-10-04 RX ADMIN — TRANEXAMIC ACID 1000 MG: 10 INJECTION, SOLUTION INTRAVENOUS at 11:49:00

## 2024-10-04 RX ADMIN — ROCURONIUM BROMIDE 30 MG: 10 INJECTION, SOLUTION INTRAVENOUS at 09:47:00

## 2024-10-04 RX ADMIN — ROCURONIUM BROMIDE 10 MG: 10 INJECTION, SOLUTION INTRAVENOUS at 10:55:00

## 2024-10-04 RX ADMIN — MIDAZOLAM HYDROCHLORIDE 2 MG: 1 INJECTION INTRAMUSCULAR; INTRAVENOUS at 08:44:00

## 2024-10-04 RX ADMIN — ROCURONIUM BROMIDE 10 MG: 10 INJECTION, SOLUTION INTRAVENOUS at 10:22:00

## 2024-10-04 RX ADMIN — ROCURONIUM BROMIDE 10 MG: 10 INJECTION, SOLUTION INTRAVENOUS at 11:32:00

## 2024-10-04 RX ADMIN — ESMOLOL HYDROCHLORIDE 20 MG: 10 INJECTION INTRAVENOUS at 10:41:00

## 2024-10-04 RX ADMIN — SODIUM CHLORIDE: 9 INJECTION, SOLUTION INTRAVENOUS at 10:16:00

## 2024-10-04 RX ADMIN — ESMOLOL HYDROCHLORIDE 20 MG: 10 INJECTION INTRAVENOUS at 10:35:00

## 2024-10-04 RX ADMIN — DEXTROSE MONOHYDRATE: 100 INJECTION, SOLUTION INTRAVENOUS at 08:33:00

## 2024-10-04 RX ADMIN — ROCURONIUM BROMIDE 50 MG: 10 INJECTION, SOLUTION INTRAVENOUS at 09:00:00

## 2024-10-04 RX ADMIN — ONDANSETRON 4 MG: 2 INJECTION INTRAMUSCULAR; INTRAVENOUS at 10:00:00

## 2024-10-04 RX ADMIN — TRANEXAMIC ACID 1000 MG: 10 INJECTION, SOLUTION INTRAVENOUS at 09:11:00

## 2024-10-04 NOTE — PLAN OF CARE
Pt to OR with Dr. Murphy. HD at bedside post surgery. Pt alert and following commands. Q4 neuros done. Propofol and fentanyl gtt continued. Bilateral wrist restraints in place. ROM performed Q 2 hours. Bed locked and in lowest position. Call light within reach. Family at bedside and updated on plan of care    Problem: Patient Centered Care  Goal: Patient preferences are identified and integrated in the patient's plan of care  Description: Interventions:  - What would you like us to know as we care for you?   - Provide timely, complete, and accurate information to patient/family  - Incorporate patient and family knowledge, values, beliefs, and cultural backgrounds into the planning and delivery of care  - Encourage patient/family to participate in care and decision-making at the level they choose  - Honor patient and family perspectives and choices  Outcome: Progressing     Problem: Diabetes/Glucose Control  Goal: Glucose maintained within prescribed range  Description: INTERVENTIONS:  - Monitor Blood Glucose as ordered  - Assess for signs and symptoms of hyperglycemia and hypoglycemia  - Administer ordered medications to maintain glucose within target range  - Assess barriers to adequate nutritional intake and initiate nutrition consult as needed  - Instruct patient on self management of diabetes  Outcome: Progressing     Problem: PAIN - ADULT  Goal: Verbalizes/displays adequate comfort level or patient's stated pain goal  Description: INTERVENTIONS:  - Encourage pt to monitor pain and request assistance  - Assess pain using appropriate pain scale  - Administer analgesics based on type and severity of pain and evaluate response  - Implement non-pharmacological measures as appropriate and evaluate response  - Consider cultural and social influences on pain and pain management  - Manage/alleviate anxiety  - Utilize distraction and/or relaxation techniques  - Monitor for opioid side effects  - Notify MD/LIP if  interventions unsuccessful or patient reports new pain  - Anticipate increased pain with activity and pre-medicate as appropriate  Outcome: Progressing     Problem: SAFETY ADULT - FALL  Goal: Free from fall injury  Description: INTERVENTIONS:  - Assess pt frequently for physical needs  - Identify cognitive and physical deficits and behaviors that affect risk of falls.  - Weldon fall precautions as indicated by assessment.  - Educate pt/family on patient safety including physical limitations  - Instruct pt to call for assistance with activity based on assessment  - Modify environment to reduce risk of injury  - Provide assistive devices as appropriate  - Consider OT/PT consult to assist with strengthening/mobility  - Encourage toileting schedule  Outcome: Progressing     Problem: Safety Risk - Non-Violent Restraints  Goal: Patient will remain free from self-harm  Description: INTERVENTIONS:  - Apply the least restrictive restraint to prevent harm  - Notify patient and family of reasons restraints applied  - Assess for any contributing factors to confusion (electrolyte disturbances, delirium, medications)  - Discontinue any unnecessary medical devices as soon as possible  - Assess the patient's physical comfort, circulation, skin condition, hydration, nutrition and elimination needs   - Reorient and redirection as needed  - Assess for the need to continue restraints  Outcome: Progressing     Problem: CARDIOVASCULAR - ADULT  Goal: Maintains optimal cardiac output and hemodynamic stability  Description: INTERVENTIONS:  - Monitor vital signs, rhythm, and trends  - Monitor for bleeding, hypotension and signs of decreased cardiac output  - Evaluate effectiveness of vasoactive medications to optimize hemodynamic stability  - Monitor arterial and/or venous puncture sites for bleeding and/or hematoma  - Assess quality of pulses, skin color and temperature  - Assess for signs of decreased coronary artery perfusion - ex.  Angina  - Evaluate fluid balance, assess for edema, trend weights  Outcome: Progressing  Goal: Absence of cardiac arrhythmias or at baseline  Description: INTERVENTIONS:  - Continuous cardiac monitoring, monitor vital signs, obtain 12 lead EKG if indicated  - Evaluate effectiveness of antiarrhythmic and heart rate control medications as ordered  - Initiate emergency measures for life threatening arrhythmias  - Monitor electrolytes and administer replacement therapy as ordered  Outcome: Progressing     Problem: RESPIRATORY - ADULT  Goal: Achieves optimal ventilation and oxygenation  Description: INTERVENTIONS:  - Assess for changes in respiratory status  - Assess for changes in mentation and behavior  - Position to facilitate oxygenation and minimize respiratory effort  - Oxygen supplementation based on oxygen saturation or ABGs  - Provide Smoking Cessation handout, if applicable  - Encourage broncho-pulmonary hygiene including cough, deep breathe, Incentive Spirometry  - Assess the need for suctioning and perform as needed  - Assess and instruct to report SOB or any respiratory difficulty  - Respiratory Therapy support as indicated  - Manage/alleviate anxiety  - Monitor for signs/symptoms of CO2 retention  Outcome: Progressing     Problem: GASTROINTESTINAL - ADULT  Goal: Maintains or returns to baseline bowel function  Description: INTERVENTIONS:  - Assess bowel function  - Maintain adequate hydration with IV or PO as ordered and tolerated  - Evaluate effectiveness of GI medications  - Encourage mobilization and activity  - Obtain nutritional consult as needed  - Establish a toileting routine/schedule  - Consider collaborating with pharmacy to review patient's medication profile  Outcome: Progressing

## 2024-10-04 NOTE — BRIEF OP NOTE
Pre-Operative Diagnosis: 1) left closed displaced femoral neck fracture with delayed healing, 2) age-related osteoporosis with left hip fracture and delayed healing     Post-Operative Diagnosis: Same    Procedure Performed: Left anterior total hip arthroplasty with fluoroscopy      Surgeons and Role:     * Humberto Murphy MD - Primary    Assistant(s): Tanya Rivas PA-C (first assistant); Gurjit Agosto CSA (second assistant)    Anesthesia: Dr. Gregg with general endotracheal anesthesia (patient has been intubated from the ICU)     Surgical Findings: Charlie/Biomet G7 54 mm cup with 44 mm dual mobility liner, Avenir Justin cemented stem size 4 polished standard neck offset, Biolox 28 mm ceramic femoral head with a -3.5 mm femoral neck, Vivacit-E Dual Mobility 28 mm x 44 mm polyethylene.  Drain: None  Specimen: Left femoral head to pathology  Complications: None  Estimated Blood Loss: Blood Output: 500 mL (10/4/2024 11:55 AM)  Stable to ICU intubated.  Tranexamic acid 1 g IV second dose given at end of case.    Humberto Murphy MD  10/4/2024  12:08 PM

## 2024-10-04 NOTE — PROGRESS NOTES
Critical Care Progress Note     Assessment / Plan:  Acute respiratory failure - due to pulmonary edema and/or aspiration. Intubated 9/29. CXR with interval improvement  - continue full vent support  - SBT after surgery today  - minimize sedation as able  - empiric Zosyn completes today for five day course  - volume management with HD  ESRD   - HD per nephrology  KRAIG  - on CPAP at home  Asthma  - Advair in lieu of Symbicort once extubated  Coffee ground output from OGT - resolved  - IV Protonix BID  Anemia - due to above  - transfuse to keep hgb >7  Hip fracture after fall  - surgery is planned for today; patient is high risk from pulmonary perspective for surgery  DM  - SSI  FEN  - TFs on hold  Ppx  - subcu heparin on hold  - PPI  Dispo  - ICU    Discussed with family at bedside    Critical care time: 35 minutes      Subjective:  Increased residuals with tube feeds yesterday- now on hold.     Objective:  Vitals:    10/04/24 0300 10/04/24 0400 10/04/24 0500 10/04/24 0600   BP:       BP Location:       Pulse: 82 82 82 83   Resp: 14 15 14 14   Temp:  98.2 °F (36.8 °C)     TempSrc:  Temporal     SpO2: 100% 100% 100% 100%   Weight:    145 lb 8.1 oz (66 kg)     Physical Exam:  General: intubated  Skin: no rash, ulcers or subcutaneous nodules  Eyes: anicteric sclerae, moist conjunctivae  Head, ears, nose, throat: atraumatic, oropharynx clear with moist mucous membranes  Neck: trachea midline with no thyromegaly  Heart: regular rate and rhythm, no murmurs / rubs / gallops  Lungs: clear bilaterally  Abdomen: soft, nontender, nondistended   Extremities: no edema or cyanosis  Psych: sedated    Medications:  Reviewed in EMR    Lab Data:  Reviewed in EMR    Imaging:  I independently visualized all relevant chest imaging in PACS and agree with radiology interpretation except where noted.

## 2024-10-04 NOTE — ANESTHESIA POSTPROCEDURE EVALUATION
Patient: Ollie Hernández    Procedure Summary       Date: 10/04/24 Room / Location: [facility] MAIN OR  / [facility] MAIN OR    Anesthesia Start: 0844 Anesthesia Stop: 1250    Procedure: Anterior left hip replacement (Left: Hip) Diagnosis: (closed displaced fracture, age related osteoarthritis)    Surgeons: Humberto Murphy MD Anesthesiologist: Talha Gregg MD    Anesthesia Type: general ASA Status: 4            Anesthesia Type: general    Vitals Value Taken Time   /45 10/04/24 1546   Temp Per RN flowsheet 10/04/24 1546   Pulse 88 10/04/24 1546   Resp 14 10/04/24 1546   SpO2 100 10/04/24 1546       [facility] AN Post Evaluation:   Patient Evaluated in ICU  Patient Participation: complete - patient participated  Level of Consciousness: awake and responsive to verbal stimuli  Pain Score: 0  Pain Management: adequateYes    Nausea/Vomiting: none  Cardiovascular Status: hemodynamically stable  Respiratory Status: intubated and spontaneous ventilation  Postoperative Hydration acceptable  Comments: Ollie is in the ICU as preop, intubated.  Nods and shakes appropriately.  Reports no pain.  No nausea.  Receiving dialysis now.  30% FIO2, PEEP 5.  105/45, P88, 100% SPO2.  His wife Stephy left to get lunch, I updated her daughter and grandson in the family lounge.  No apparent complications from anesthesia.      Talha Gregg MD  10/4/2024 3:46 PM

## 2024-10-04 NOTE — PLAN OF CARE
Pt sedated on ventilator. Due to agitation, pt switched from precedex to propofol. SAT- pt following commands. SAT performed by RT- no plans to extubate due to surgery in AM. Restraints on for pt safety. KUB completed. Tube feeding infusing. Pt NPO at midnight, heparin held overnight for planned surgery in AM. HD planned for tomorrow after surgery is completed.         Problem: Safety Risk - Non-Violent Restraints  Goal: Patient will remain free from self-harm  Description: INTERVENTIONS:  - Apply the least restrictive restraint to prevent harm  - Notify patient and family of reasons restraints applied  - Assess for any contributing factors to confusion (electrolyte disturbances, delirium, medications)  - Discontinue any unnecessary medical devices as soon as possible  - Assess the patient's physical comfort, circulation, skin condition, hydration, nutrition and elimination needs   - Reorient and redirection as needed  - Assess for the need to continue restraints  10/3/2024 1902 by Viviana Lucas, RN  Outcome: Progressing  10/3/2024 0820 by Viviana Lucas, RN  Outcome: Progressing     Problem: CARDIOVASCULAR - ADULT  Goal: Maintains optimal cardiac output and hemodynamic stability  Description: INTERVENTIONS:  - Monitor vital signs, rhythm, and trends  - Monitor for bleeding, hypotension and signs of decreased cardiac output  - Evaluate effectiveness of vasoactive medications to optimize hemodynamic stability  - Monitor arterial and/or venous puncture sites for bleeding and/or hematoma  - Assess quality of pulses, skin color and temperature  - Assess for signs of decreased coronary artery perfusion - ex. Angina  - Evaluate fluid balance, assess for edema, trend weights  Outcome: Progressing     Problem: RESPIRATORY - ADULT  Goal: Achieves optimal ventilation and oxygenation  Description: INTERVENTIONS:  - Assess for changes in respiratory status  - Assess for changes in mentation and behavior  - Position to  facilitate oxygenation and minimize respiratory effort  - Oxygen supplementation based on oxygen saturation or ABGs  - Provide Smoking Cessation handout, if applicable  - Encourage broncho-pulmonary hygiene including cough, deep breathe, Incentive Spirometry  - Assess the need for suctioning and perform as needed  - Assess and instruct to report SOB or any respiratory difficulty  - Respiratory Therapy support as indicated  - Manage/alleviate anxiety  - Monitor for signs/symptoms of CO2 retention  Outcome: Progressing

## 2024-10-04 NOTE — PROGRESS NOTES
[facility]  part of [facility]    Progress Note    Ollie Hernández Patient Status:  Inpatient    1945 MRN [id number]   Location [facility] 2W/SW Attending Pranay Michel MD   Hosp Day # 6 PCP Wili Parmar MD     Chief Complaint:   Chief Complaint   Patient presents with    Fall       Subjective:   Ollie Hernández is in OR    Per nursing has not tolerated TF so this was d/c'ed overnight  Pt remain intubated   Will be getting HD today as well    No events overnght  Objective:   Objective:    Blood pressure (!) 172/63, pulse 87, temperature 98.2 °F (36.8 °C), temperature source Temporal, resp. rate 14, weight 145 lb 8.1 oz (66 kg), SpO2 100%.    Physical Exam:    Deferred- pt in OR      Results:   Results:    Labs:  Recent Labs   Lab 24  0427 24  1620 10/01/24  0330 10/02/24  0450 10/03/24  0421 10/04/24  0307   WBC 11.2*  --  10.7 8.1 8.5 8.3   HGB 10.0* 9.9* 10.5* 9.3* 10.3* 9.9*   MCV 93.2  --  93.5 89.9 90.1 92.4   .0*  --  148.0* 159.0 176.0 182.0       Recent Labs   Lab 24  1204 24  0438 24  0755 24  0427 10/01/24  0330 10/02/24  0437 10/03/24  0421 10/04/24  0307   *   < > 176*   < > 135* 140* 215* 261*   BUN 59*   < > 62*   < > 36* 61* 48* 70*   CREATSERUM 4.85*   < > 5.38*   < > 3.98* 5.88* 4.67* 6.05*   CA 10.2   < > 9.5   < > 9.8 9.9 10.4 10.1   ALB 3.9  --  3.7  --  3.9  --   --   --       < > 140   < > 139 137 140 137   K 3.9   < > 4.4   < > 3.5 3.4* 4.0 3.4*      < > 104   < > 99 98 100 99   CO2 31.0   < > 25.0   < > 29.0 24.0 27.0 25.0   ALKPHO 75  --  75  --  63  --   --   --    AST 30  --  23  --  22  --   --   --    ALT 17  --  13  --  9*  --   --   --    BILT 0.5  --  0.7  --  1.2*  --   --   --    TP 7.2  --  7.2  --  7.0  --   --   --     < > = values in this interval not displayed.       Estimated Creatinine Clearance: 8.6 mL/min (A) (based on SCr of 6.05 mg/dL (H)).    No results for input(s):  \"PTP\", \"INR\" in the last 168 hours.         Culture:  Hospital Encounter on 09/28/24   1. Blood Culture     Status: None (Preliminary result)    Collection Time: 09/30/24 10:03 AM    Specimen: Bld,Arterial Line; Blood   Result Value Ref Range    Blood Culture Result No Growth 3 Days N/A       Cardiac  No results for input(s): \"TROP\", \"PBNP\" in the last 168 hours.      Imaging: Imaging data reviewed in Epic.  XR ABDOMEN (1 VIEW) (CPT=74018)    Result Date: 10/3/2024  CONCLUSION:   Enteric tube within the mid body of the stomach.  Nonobstructive bowel gas pattern.  Featureless colonic bowel loops, could represent sequela of infectious/inflammatory process.      Dictated by (CST): Apple Pierre MD on 10/03/2024 at 4:03 PM     Finalized by (CST): Apple Pierre MD on 10/03/2024 at 4:09 PM          XR CHEST AP PORTABLE  (CPT=71045)    Result Date: 10/3/2024  CONCLUSION:  1. Borderline cardiomegaly.  Tortuous atherosclerotic aorta. 2. Watchman device visualized left atrial appendage. 3. Prominent bibasilar pulmonary interstitium with slight progression. 4. Support tubes and catheters are satisfactory    Dictated by (CST): Juan Bolden MD on 10/03/2024 at 8:15 AM     Finalized by (CST): Juan Bolden MD on 10/03/2024 at 8:17 AM          XR CHEST AP PORTABLE  (CPT=71045)    Result Date: 10/2/2024  CONCLUSION:   Well-positioned endotracheal tube and enteric tube.  No significant change in the cardiopulmonary findings.    Dictated by (CST): Vernon Law MD on 10/02/2024 at 2:55 PM     Finalized by (CST): Vernon Lwa MD on 10/02/2024 at 2:58 PM           Medications:    clonidine-EPINEPHrine-ropivacaine-ketorolac (CERTS) (Duraclon-Adrenalin-Naropin-Toradol) pain cocktail irrigation   Intra-articular Once (Intra-Op)    [Transfer Hold] senna  10 mL Oral BID    [Transfer Hold] docusate  100 mg Oral BID    [Transfer Hold] mineral oil-white petrolatum  1 Application Both Eyes Nightly    ceFAZolin  2 g  Intravenous 30 Min Pre-Op    [Transfer Hold] lidocaine-menthol  2 patch Transdermal Daily    [Held by provider] heparin  5,000 Units Subcutaneous Q8H [facility]    [Transfer Hold] pantoprazole  40 mg Intravenous Q12H    piperacillin-tazobactam  3.375 g Intravenous Q12H    [Transfer Hold] cetirizine  5 mg Per NG Tube Daily    [Transfer Hold] carvedilol  25 mg Per NG Tube BID    [Transfer Hold] atorvastatin  40 mg Per NG Tube Nightly    [Transfer Hold] insulin aspart  1-7 Units Subcutaneous q6h    [Transfer Hold] chlorhexidine gluconate  15 mL Mouth/Throat BID@0800,2000    [Transfer Hold] fluticasone-salmeterol  1 puff Inhalation BID         Assessment and Plan:   Assessment & Plan:      Acute hypoxic respiratory failure   Hypotension   Aspiration PNA  Pulmonary and cardiology on consult.   Secondary to pulmonary edema and aspiration  Intubated 9/29 - cont full vent support.   Empiric zosyn.   Sputum cx normal julio c. Blood cx x 4 NGTD  Daily SBT.   Lactic acid normal. WBC trending down   Volume management per nephrology   CXR pulmonary edema. Improved alveolar opacities.   Wean pressors as able to keep MAP > 65 or sBP > 90   10/4: in OR  Per nursing has not tolerated TF so this was d/c'ed overnight-will hold off TF until extubation planned for tomorrow; will have SLP evaluated and possibly advance diet in AM  Pt remain intubated   Will be getting HD today as well  ESRD   HD per nephrology MWF  Nephro on consult.     Hip fracture   S/p fall PTA   Orthopedic surgery on consult.   Plan for surgery 10/4  High risk for surgery  however surgery is necessary.   DM II   A1c   ISS   Paroxysmal A.fib - currently NSR   Acute on chronic HFpEF  CAD s/p jodi circ 5/24'   Elevate troponin secondary to demand ischemia   S/p watchman device 9/11/24  Cont coreg, statin  No further cardiac testing prior to planned surgery.   Resume ASA/Plavix once safely able to do so.   Coffee ground emesis   Resolved.   IV protonix BID        >55min spent,  >50% spent counseling and coordinating care in the form of educating pt/family and d/w consultants and staff. Most of the time spent discussing the above plan.        Plan of care discussed with patient or family at bedside.    Lizbeth Rey MD            Supplementary Documentation:     Quality:  DVT Prophylaxis: heparin   CODE status: Full  Dispo: per clinical course            Estimated date of discharge: TBD  Discharge is dependent on: clinical stability  At this point Mr. Hernández is expected to be discharge to: TBD

## 2024-10-04 NOTE — PROGRESS NOTES
[facility]  part of [facility]    Progress Note    Ollie Hernández Patient Status:  Inpatient    1945 MRN [id number]   Location [facility] 2W/SW Attending Radha Gabriel MD   Hosp Day # 6 PCP Wili Parmar MD       Subjective:   Ollie Hernández is a(n) 79 year old male who I am seeing for ESRD    Intubated/ more awake   No overnight issues  Not on any pressors  Plan for surg later today  TF on hold    Family at bedside    Objective:   BP (!) 172/63 (BP Location: Left arm)   Pulse 87   Temp 98.2 °F (36.8 °C) (Temporal)   Resp 14   Wt 145 lb 8.1 oz (66 kg)   SpO2 100%   BMI 24.98 kg/m²      Intake/Output Summary (Last 24 hours) at 10/4/2024 1129  Last data filed at 10/4/2024 1104  Gross per 24 hour   Intake 2101.6 ml   Output 1050 ml   Net 1051.6 ml     Wt Readings from Last 1 Encounters:   10/04/24 145 lb 8.1 oz (66 kg)       Exam  Vital signs: Blood pressure (!) 172/63, pulse 87, temperature 98.2 °F (36.8 °C), temperature source Temporal, resp. rate 14, weight 145 lb 8.1 oz (66 kg), SpO2 100%.    General: No acute distress.   HEENT: Moist mucous membranes. EOMI. ETT  Neck:  No JVD.   Respiratory: Clear to auscultation bilaterally.    Cardiovascular: S1, S2.  Regular rate and rhythm.   Abdomen: Soft, nontender, nondistended.    Neurologic: No focal neurological deficits.  Musculoskeletal: Full range of motion of all extremities.  No swelling noted.  Access:AVF    Results:     Recent Labs   Lab 24  0427 24  1620 10/01/24  0330 10/02/24  0450 10/03/24  0421 10/04/24  0307   RBC 3.24*  --  3.40* 3.06* 3.44* 3.29*   HGB 10.0*   < > 10.5* 9.3* 10.3* 9.9*   HCT 30.2*   < > 31.8* 27.5* 31.0* 30.4*   MCV 93.2  --  93.5 89.9 90.1 92.4   MCH 30.9  --  30.9 30.4 29.9 30.1   MCHC 33.1  --  33.0 33.8 33.2 32.6   RDW 16.1*  --  16.6* 16.4* 16.2* 16.3*   NEPRELIM 9.33*  --  8.99*  --   --  6.51   WBC 11.2*  --  10.7 8.1 8.5 8.3   .0*  --  148.0* 159.0 176.0 182.0    <  > = values in this interval not displayed.         Recent Labs   Lab 10/02/24  0437 10/03/24  0421 10/04/24  0307   * 215* 261*   BUN 61* 48* 70*   CREATSERUM 5.88* 4.67* 6.05*   CA 9.9 10.4 10.1    140 137   K 3.4* 4.0 3.4*   CL 98 100 99   CO2 24.0 27.0 25.0          XR ABDOMEN (1 VIEW) (CPT=74018)    Result Date: 10/3/2024  CONCLUSION:   Enteric tube within the mid body of the stomach.  Nonobstructive bowel gas pattern.  Featureless colonic bowel loops, could represent sequela of infectious/inflammatory process.      Dictated by (CST): Apple Pierre MD on 10/03/2024 at 4:03 PM     Finalized by (CST): Apple Pierre MD on 10/03/2024 at 4:09 PM          XR CHEST AP PORTABLE  (CPT=71045)    Result Date: 10/3/2024  CONCLUSION:  1. Borderline cardiomegaly.  Tortuous atherosclerotic aorta. 2. Watchman device visualized left atrial appendage. 3. Prominent bibasilar pulmonary interstitium with slight progression. 4. Support tubes and catheters are satisfactory    Dictated by (CST): Juan Bolden MD on 10/03/2024 at 8:15 AM     Finalized by (CST): Juan Bolden MD on 10/03/2024 at 8:17 AM          XR CHEST AP PORTABLE  (CPT=71045)    Result Date: 10/2/2024  CONCLUSION:   Well-positioned endotracheal tube and enteric tube.  No significant change in the cardiopulmonary findings.    Dictated by (CST): Vernon Law MD on 10/02/2024 at 2:55 PM     Finalized by (CST): Vernon Law MD on 10/02/2024 at 2:58 PM           Assessment and Plan:     79 year old male with past medical history of T2DM, HTN, HLD, ESRD on HD MWF, CAD s/p PCI (5/2024), A.fib on Eliquis, HFpEF, KRAIG, BPH, history of recurrent gastrointestinal bleeding presented to the ER after a fall sustaining a left hip fracture    Impression:    ESRD, HD Monday Wednesday Friday  Hypoxic respiratory failure, possible aspiration; now intubated  Left hip fracture, Ortho following-surg today  Hypertension with ESRD  Diabetes with  nephropathy  A-fib s/p watchman  History of GI bleed  Anemia, hgb is stable  Secondary hyperparathyroidism, monitor calcium and phosphorus      Plan:    HD Monday Wednesday schedule- plan HD after surg today  Cautiously replace K  Monitor h/h    Thank you very much for allowing me to participate in the care of your patient.  If you have any questions, please do not hesitate to contact me.     Moni Pugh MD

## 2024-10-04 NOTE — PROGRESS NOTES
[facility]  part of [facility]    Cardiology Progress Note    Ollie Hernández Patient Status:  Inpatient    1945 MRN [id number]   Location [facility] 2W/SW Attending Pranay Michel MD   Hosp Day # 6 PCP Wili Parmar MD       Impression/Plan:  79 year old male presenting with:    Hip fracture Left   Acute resp failure, aspiration and/or pulmonary edema; intubated 2024  ESRD on HD  DM  PAF, currently SR. S/p watchman device 24  HTN  HL, on statin PTA  Acute on chronic diastolic CHF  CAD s/p Ramin circ 24   Coffee grounds in OG tube  Elevated troponin, likely demand ischemia    - Cont carvedilol for rate control  - Cont statin  - Asa/plavix on hold (recent Watchman implant 2024); resume as safely able.  If prolonged hold anticipated consider heparin gtt  - Patient inherently high risk for adverse angelica-op events given acute resp failure, CAD and AF; however, currently rate controlled, TERRI 2024 with normal LV function and s/p revasc 2024.  No further cardiac testing needed prior to planned ortho procedures. Surgery for Friday  - HD as per nephrology  - Vent management as per pulm  - Monitor on tele  - Will follow reviewed w/ family at bedside      Subjective:   Intubated, sedated.  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)    Atherosclerosis of native arteries of extremities with intermittent claudication, bilateral legs  (HCC)    Primary hypertension    Smokers' cough (HCC)    Unstable angina (HCC)    Lower GI bleed    ESRD (end stage renal disease) on dialysis (HCC)    PAF (paroxysmal atrial fibrillation) (HCC)    AVM (arteriovenous malformation) of colon    ABLA (acute blood loss anemia)    Rectal bleeding    Anticoagulated    Antiplatelet or antithrombotic long-term use    Lower GI bleeding    ESRD on hemodialysis (HCC)    GI bleed    Closed displaced midcervical fracture of left femur with delayed healing    ESRD (end stage renal disease) (HCC)    Closed fracture of left hip (HCC)       Objective:   Temp: 98.2 °F (36.8 °C)  Pulse: 83  Resp: 14  AO: 136/42  FiO2 (%): 30 %    Intake/Output:     Intake/Output Summary (Last 24 hours) at 10/4/2024 0631  Last data filed at 10/3/2024 2000  Gross per 24 hour   Intake 1159.25 ml   Output 500 ml   Net 659.25 ml       Last 3 Weights   10/02/24 1310 156 lb 4.9 oz (70.9 kg)   10/02/24 0800 143 lb 11.8 oz (65.2 kg)   10/01/24 0600 160 lb 0.9 oz (72.6 kg)   09/30/24 0400 164 lb 0.4 oz (74.4 kg)   09/28/24 1649 155 lb 11.2 oz (70.6 kg)   08/22/24 1311 159 lb 12.8 oz (72.5 kg)   08/20/24 0933 158 lb (71.7 kg)       Tele: NSR    Physical Exam:    General: Intubated, sedated  HEENT: Normocephalic, anicteric sclera ET/OG tube   Neck: supple  Cardiac: Regular rate and rhythm. S1, S2 normal. No murmur, pericardial rub, S3, thrill, heave or extra cardiac sounds.  Lungs: Coarse breath sounds bilat  Abdomen: Soft, non-distended  Extremities: Without clubbing, cyanosis or edema.  Peripheral pulses are 2+. SCD boots   Neurologic: Intubated, sedated  Skin: Warm and dry.     Laboratory/Data:    Labs:         Recent Labs   Lab 09/30/24  0427 09/30/24  1620 10/01/24  0330 10/02/24  0450 10/03/24  0421 10/04/24  0307   WBC 11.2*  --  10.7 8.1 8.5 8.3   HGB 10.0* 9.9* 10.5* 9.3* 10.3* 9.9*   MCV 93.2  --  93.5 89.9 90.1 92.4   .0*  --  148.0* 159.0 176.0 182.0       Recent Labs   Lab 09/29/24  0434  09/29/24  0755 09/30/24 0427 10/01/24  0330 10/02/24  0437 10/03/24  0421 10/04/24  0307      < > 139 139 137 140 137   K 4.3   < > 3.5 3.5 3.4* 4.0 3.4*      < > 98 99 98 100 99   CO2 26.0   < > 29.0 29.0 24.0 27.0 25.0   BUN 59*   < > 55* 36* 61* 48* 70*   CREATSERUM 5.55*   < > 4.88* 3.98* 5.88* 4.67* 6.05*   CA 9.5   < > 9.9 9.8 9.9 10.4 10.1   MG 1.8  --  1.8  --  2.2  --   --    PHOS 6.4*  --  5.6*  --   --   --   --    *   < > 117* 135* 140* 215* 261*    < > = values in this interval not displayed.       Recent Labs   Lab 09/28/24  1204 09/29/24  0755 10/01/24  0330   ALT 17 13 9*   AST 30 23 22   ALB 3.9 3.7 3.9       No results for input(s): \"TROP\" in the last 168 hours.    Allergies:   Allergies   Allergen Reactions    Adhesive Tape OTHER (SEE COMMENTS)     Severe rashes    Dust Mite Extract RASH       Medications:  Current Facility-Administered Medications   Medication Dose Route Frequency    clonidine-EPINEPHrine-ropivacaine-ketorolac (CERTS) (Duraclon-Adrenalin-Naropin-Toradol) pain cocktail irrigation   Intra-articular Once (Intra-Op)    sodium chloride 0.9 % IV bolus 100 mL  100 mL Intravenous Q30 Min PRN    And    albumin human (Albumin) 25% injection 25 g  25 g Intravenous PRN Dialysis    senna (Senokot) 8.8 MG/5ML oral syrup 17.6 mg  10 mL Oral BID    docusate (Colace) 50 MG/5ML oral solution 100 mg  100 mg Oral BID    mineral oil-white petrolatum (Artificial Tears) 83-15 % ophthalmic ointment 1 Application  1 Application Both Eyes Nightly    norepinephrine (Levophed) 4 mg/250mL infusion premix  0.5-30 mcg/min Intravenous Continuous    ceFAZolin (Ancef) 2g in 10mL IV syringe premix  2 g Intravenous 30 Min Pre-Op    tranexamic acid in sodium chloride 0.7% (Cyklokapron) 1000 mg/100mL infusion premix 1,000 mg  1,000 mg Intravenous 30 Min Pre-Op    lidocaine-menthol 4-1 % patch 2 patch  2 patch Transdermal Daily    dexmedeTOMIDine in sodium chloride 0.9% (Precedex) 400 mcg/100mL  infusion premix  0.2-1.5 mcg/kg/hr (Dosing Weight) Intravenous Continuous    [Held by provider] heparin (Porcine) 5000 UNIT/ML injection 5,000 Units  5,000 Units Subcutaneous Q8H STEPHANIE    fentaNYL (Sublimaze) 25 mcg BOLUS FROM BAG infusion  25 mcg Intravenous PRN    pantoprazole (Protonix) 40 mg in sodium chloride 0.9% PF 10 mL IV push  40 mg Intravenous Q12H    piperacillin-tazobactam (Zosyn) 3.375 g in dextrose 5% 100 mL IVPB-ADDV  3.375 g Intravenous Q12H    hydrALAzine (Apresoline) 20 mg/mL injection 10 mg  10 mg Intravenous Q6H PRN    labetalol (Trandate) 5 mg/mL injection 10 mg  10 mg Intravenous Q4H PRN    cetirizine (ZyrTEC) tab 5 mg  5 mg Per NG Tube Daily    carvedilol (Coreg) tab 25 mg  25 mg Per NG Tube BID    atorvastatin (Lipitor) tab 40 mg  40 mg Per NG Tube Nightly    acetaminophen (Tylenol) 160 MG/5ML oral liquid 500 mg  500 mg Per NG Tube Daily PRN    acetaminophen (Tylenol) tab 650 mg  650 mg Oral Q4H PRN    Or    acetaminophen (Tylenol) 160 MG/5ML oral liquid 650 mg  650 mg Per NG Tube Q4H PRN    Or    acetaminophen (Tylenol) rectal suppository 650 mg  650 mg Rectal Q4H PRN    Or    acetaminophen (Ofirmev) 10 mg/mL infusion premix 1,000 mg  1,000 mg Intravenous Q6H PRN    polyethylene glycol (PEG 3350) (Miralax) 17 g oral packet 17 g  17 g Per NG Tube Daily PRN    insulin aspart (NovoLOG) 100 Units/mL FlexPen 1-7 Units  1-7 Units Subcutaneous q6h    heparin (Porcine) 1000 UNIT/ML injection 1,500 Units  1.5 mL Intravenous PRN Dialysis    lidocaine-prilocaine (Emla) 2.5-2.5 % cream   Topical PRN    bisacodyl (Dulcolax) 10 MG rectal suppository 10 mg  10 mg Rectal Daily PRN    chlorhexidine gluconate (Peridex) 0.12 % oral solution 15 mL  15 mL Mouth/Throat BID@0800,2000    propofol (Diprivan) 10 mg/mL infusion premix  5-50 mcg/kg/min (Dosing Weight) Intravenous Continuous    fentaNYL in sodium chloride 0.9% (Sublimaze) 1000 mcg/100mL infusion premix   mcg/hr Intravenous Continuous PRN     albuterol (Ventolin HFA) 108 (90 Base) MCG/ACT inhaler 2 puff  2 puff Inhalation Q4H PRN    fluticasone propionate (Flonase) 50 MCG/ACT nasal suspension 2 spray  2 spray Nasal Daily PRN    glucose (Dex4) 15 GM/59ML oral liquid 15 g  15 g Oral Q15 Min PRN    Or    glucose (Glutose) 40% oral gel 15 g  15 g Oral Q15 Min PRN    Or    glucose-vitamin C (Dex-4) chewable tab 4 tablet  4 tablet Oral Q15 Min PRN    Or    dextrose 50% injection 50 mL  50 mL Intravenous Q15 Min PRN    Or    glucose (Dex4) 15 GM/59ML oral liquid 30 g  30 g Oral Q15 Min PRN    Or    glucose (Glutose) 40% oral gel 30 g  30 g Oral Q15 Min PRN    Or    glucose-vitamin C (Dex-4) chewable tab 8 tablet  8 tablet Oral Q15 Min PRN    ipratropium-albuterol (Duoneb) 0.5-2.5 (3) MG/3ML inhalation solution 3 mL  3 mL Nebulization Q6H PRN    fluticasone-salmeterol (Advair Diskus) 250-50 MCG/ACT inhaler 1 puff  1 puff Inhalation BID

## 2024-10-04 NOTE — RESPIRATORY THERAPY NOTE
Patient is on full ventilator support. Suction provided as needed, bilateral bs, no acute events or changes made overnight, RT will continue to monitor.           10/04/24 0306   Vent Information   Interface Invasive   Vent Type AV   Vent plugged into main power? Yes   Vent Mode VC/AC   Settings   FiO2 (%) 30 %   Resp Rate (Set) 14   Vt (Set, mL) 500 mL   Waveform Decelerating ramp   PEEP/CPAP (cm H2O) 5 cm H20   Insp Flow (L/sec) 60 L/sec   Trigger Sensitivity Flow (L/min) 1 L/min   Humidification Heat and moisture exchanger   Readings   Total RR 15   Minute Ventilation (L/min) 6.3 L/min   Expiratory Tidal Volume 450 mL   PIP Observed (cm H2O) 26 cm H2O   MAP (cm H2O) 9   I/E Ratio 1:5.2   Plateau Pressure (cm H2O) 22 cm H2O   Static Compliance (L/cm H2O) 26   Dynamic Compliance (L/cm H2O) 21 L/cm H2O

## 2024-10-04 NOTE — RESPIRATORY THERAPY NOTE
Patient received intubated and on ventilator. AC/VC 14/500/+5/30%. No SBT this shift. Respiratory status stable, tolerating ventilator well and suctioned as needed.

## 2024-10-04 NOTE — ANESTHESIA PREPROCEDURE EVALUATION
Anesthesia PreOp Note    HPI:     Ollie Hernández is a 79 year old male who presents for preoperative consultation requested by: Humberto Murphy MD    Date of Surgery: 9/28/2024 - 10/4/2024    Procedure(s):  Anterior left hip replacement  Indication: Left femoral neck    Relevant Problems   No relevant active problems       NPO:  Last Liquid Consumption Date: 10/03/24  Last Liquid Consumption Time: 2000  Last Solid Consumption Date: 10/03/24  Last Solid Consumption Time: 2000  Last Liquid Consumption Date: 10/03/24          History Review:  Patient Active Problem List    Diagnosis Date Noted    Closed fracture of left hip ([facility]) 09/29/2024    Closed displaced midcervical fracture of left femur with delayed healing 09/28/2024    ESRD (end stage renal disease) ([facility]) 09/28/2024    GI bleed 08/09/2024    Lower GI bleeding 07/30/2024    ESRD on hemodialysis ([facility]) 07/30/2024    Rectal bleeding 06/13/2024    Anticoagulated 06/13/2024    Antiplatelet or antithrombotic long-term use 06/13/2024    PAF (paroxysmal atrial fibrillation) ([facility]) 06/05/2024    AVM (arteriovenous malformation) of colon 06/05/2024    ABLA (acute blood loss anemia) 06/05/2024    Lower GI bleed 06/01/2024    ESRD (end stage renal disease) on dialysis ([facility]) 06/01/2024    Unstable angina ([facility]) 05/22/2024    Smokers' cough ([facility]) 02/29/2024    Primary hypertension     Secondary hyperparathyroidism ([facility]) 02/10/2022    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 ([facility]) 02/10/2022    Atherosclerosis of native arteries of extremities with intermittent claudication, bilateral legs ([facility]) 02/10/2022    Chronic obstructive asthma ([facility]) 11/14/2021    Vitamin D deficiency 02/01/2021    Chronic diastolic congestive heart failure ([facility]) 03/02/2020    Anemia of chronic renal failure 12/04/2019    KRAIG (obstructive sleep apnea) 04/25/2017    Pulmonary HTN ([facility]) 03/08/2016    Mixed hyperlipidemia  02/11/2016    Gout 11/25/2013    Type 2 diabetes mellitus with chronic kidney disease on chronic dialysis, with long-term current use of insulin (HCC) 03/10/2005       Past Medical History:    Anemia    Asthma (HCC)    Back problem    BPH (benign prostatic hyperplasia)    Calculus of kidney    Cataract    Coronary atherosclerosis    Diabetes (HCC)    ESRD (end stage renal disease) on dialysis (HCC)    Essential hypertension    High blood pressure    High cholesterol    History of blood transfusion    Hyperlipidemia    Neuropathy    hands and feet    KRAIG on CPAP    Renal disorder    Sleep apnea    Visual impairment    glasses    Vocal cord paralysis, unilateral partial       Past Surgical History:   Procedure Laterality Date    Appendectomy      1981    Back surgery      Neck/back - 1998    Capsule endoscopy - internal referral      Cataract      12/2021 and 1/2022    Cath bare metal stent (bms)      Colonoscopy      Colonoscopy N/A 01/25/2021    Procedure: COLONOSCOPY;  Surgeon: Michael Gautam MD;  Location: [facility] beds at the [facility] ENDO    Colonoscopy N/A 06/03/2024    Procedure: COLONOSCOPY;  Surgeon: Gabriel Saldana MD;  Location: [facility] ENDOSCOPY    Colonoscopy N/A 06/15/2024    Procedure: COLONOSCOPY;  Surgeon: Carlyn Storey MD;  Location: [facility] ENDOSCOPY    Colonoscopy N/A 7/31/2024    Procedure: COLONOSCOPY;  Surgeon: Gabriel Saldana MD;  Location: [facility] ENDOSCOPY    Hand/finger surgery unlisted      Accidental trauma    Spine surgery procedure unlisted      Upper gi endoscopy,diagnosis         Medications Prior to Admission   Medication Sig Dispense Refill Last Dose    clopidogrel 75 MG Oral Tab Take 1 tablet (75 mg total) by mouth daily.   9/28/2024 at 0800    escitalopram 5 MG Oral Tab Take 1 tablet (5 mg total) by mouth daily. 90 tablet 3 9/28/2024 at 0800    linaGLIPtin (TRADJENTA) 5 mg Oral Tab Take 1 tablet (5 mg total) by mouth daily. 90 tablet 1 9/28/2024 at 0800    atorvastatin 40 MG Oral Tab Take 1 tablet (40 mg total)  by mouth nightly. 90 tablet 1 9/27/2024 at 2000    felodipine ER 10 MG Oral Tablet 24 Hr Take 1 tablet (10 mg total) by mouth daily. 90 tablet 3 9/28/2024 at 0800    PANTOPRAZOLE 40 MG Oral Tab EC TAKE 1 TABLET BY MOUTH EVERY  MORNING BEFORE BREAKFAST 90 tablet 3 9/28/2024 at 0800    aspirin 81 MG Oral Tab EC Take 1 tablet (81 mg total) by mouth daily. 90 tablet 3 9/28/2024 at 0800    carvedilol 25 MG Oral Tab Take 1 tablet (25 mg total) by mouth 2 (two) times daily.   9/28/2024 at 0800    acetaminophen 500 MG Oral Tab Take 1 tablet (500 mg total) by mouth daily as needed for Pain.   9/27/2024 at 2000    Cholecalciferol 125 MCG (5000 UT) Oral Tab Take 1 tablet (5,000 Units total) by mouth 2 (two) times daily.   9/28/2024 at 0800    polyethylene glycol, PEG 3350, 17 g Oral Powd Pack daily as needed.   Past Week at \"about 2-3 days ago\"    fluticasone propionate 50 MCG/ACT Nasal Suspension 2 sprays by Nasal route daily. (Patient taking differently: 2 sprays by Nasal route daily as needed.) 48 g 3 9/27/2024 at 0800    albuterol (PROAIR HFA) 108 (90 Base) MCG/ACT Inhalation Aero Soln Inhale 2 puffs into the lungs every 4 (four) hours as needed for Wheezing. 3 each 3 9/28/2024 at 0830    Budesonide-Formoterol Fumarate (SYMBICORT) 160-4.5 MCG/ACT Inhalation Aerosol Inhale 2 puffs into the lungs 2 (two) times daily. 3 each 3 Past Week at \"about 2-3 days ago\"    cetirizine 10 MG Oral Tab Take 1 tablet (10 mg total) by mouth every other day.   9/26/2024     Current Facility-Administered Medications Ordered in Epic   Medication Dose Route Frequency Provider Last Rate Last Admin    clonidine-EPINEPHrine-ropivacaine-ketorolac (CERTS) (Duraclon-Adrenalin-Naropin-Toradol) pain cocktail irrigation   Intra-articular Once (Intra-Op) Humberto Murphy MD        sodium chloride 0.9 % IV bolus 100 mL  100 mL Intravenous Q30 Min PRN Moni Pugh MD        And    albumin human (Albumin) 25% injection 25 g  25 g Intravenous PRN Dialysis  Moni Pugh MD        senna (Senokot) 8.8 MG/5ML oral syrup 17.6 mg  10 mL Oral BID Pranay Michel MD   17.6 mg at 10/03/24 2007    docusate (Colace) 50 MG/5ML oral solution 100 mg  100 mg Oral BID Pranay Michel MD   100 mg at 10/03/24 2006    mineral oil-white petrolatum (Artificial Tears) 83-15 % ophthalmic ointment 1 Application  1 Application Both Eyes Nightly Pranay Michel MD   1 Application at 10/03/24 2007    norepinephrine (Levophed) 4 mg/250mL infusion premix  0.5-30 mcg/min Intravenous Continuous Jimmie Landeros MD   Paused at 10/02/24 1546    ceFAZolin (Ancef) 2g in 10mL IV syringe premix  2 g Intravenous 30 Min Pre-Op Humberto Murphy MD        tranexamic acid in sodium chloride 0.7% (Cyklokapron) 1000 mg/100mL infusion premix 1,000 mg  1,000 mg Intravenous 30 Min Pre-Op Humberto Murphy MD        lidocaine-menthol 4-1 % patch 2 patch  2 patch Transdermal Daily Ne Irizarry MD   2 patch at 10/03/24 0837    dexmedeTOMIDine in sodium chloride 0.9% (Precedex) 400 mcg/100mL infusion premix  0.2-1.5 mcg/kg/hr (Dosing Weight) Intravenous Continuous Jimmie Landeros MD   Stopped at 10/03/24 1359    [Held by provider] heparin (Porcine) 5000 UNIT/ML injection 5,000 Units  5,000 Units Subcutaneous Q8H [facility] Jimmie Landeros MD   5,000 Units at 10/03/24 1512    fentaNYL (Sublimaze) 25 mcg BOLUS FROM BAG infusion  25 mcg Intravenous PRN Nora Jones APRN   25 mcg at 10/04/24 0450    pantoprazole (Protonix) 40 mg in sodium chloride 0.9% PF 10 mL IV push  40 mg Intravenous Q12H Jimmie Landeros MD   40 mg at 10/03/24 2054    piperacillin-tazobactam (Zosyn) 3.375 g in dextrose 5% 100 mL IVPB-ADDV  3.375 g Intravenous Q12H Jimmie Landeros MD 25 mL/hr at 10/03/24 2200 3.375 g at 10/03/24 2200    hydrALAzine (Apresoline) 20 mg/mL injection 10 mg  10 mg Intravenous Q6H PRN Radha Gabriel MD   10 mg at 10/03/24 0503    labetalol (Trandate) 5 mg/mL injection 10 mg  10 mg Intravenous Q4H PRN Radha Gabriel,  MD   10 mg at 10/02/24 0602    cetirizine (ZyrTEC) tab 5 mg  5 mg Per NG Tube Daily Radha Gabriel MD   5 mg at 10/03/24 0836    carvedilol (Coreg) tab 25 mg  25 mg Per NG Tube BID Radha Gabriel MD   25 mg at 10/03/24 2006    atorvastatin (Lipitor) tab 40 mg  40 mg Per NG Tube Nightly Radha Gabriel MD   40 mg at 10/03/24 2006    acetaminophen (Tylenol) 160 MG/5ML oral liquid 500 mg  500 mg Per NG Tube Daily PRN Radha Gabriel MD        acetaminophen (Tylenol) tab 650 mg  650 mg Oral Q4H PRN Karla Irvin APRN        Or    acetaminophen (Tylenol) 160 MG/5ML oral liquid 650 mg  650 mg Per NG Tube Q4H PRN Karla Irvin APRN   650 mg at 10/02/24 2108    Or    acetaminophen (Tylenol) rectal suppository 650 mg  650 mg Rectal Q4H PRN Karla Irvin APRN        Or    acetaminophen (Ofirmev) 10 mg/mL infusion premix 1,000 mg  1,000 mg Intravenous Q6H PRN Karla Irvin APRN 400 mL/hr at 10/03/24 0619 1,000 mg at 10/03/24 0619    polyethylene glycol (PEG 3350) (Miralax) 17 g oral packet 17 g  17 g Per NG Tube Daily PRN Radha Gabriel MD        insulin aspart (NovoLOG) 100 Units/mL FlexPen 1-7 Units  1-7 Units Subcutaneous q6h Radha Dunham MD   5 Units at 10/04/24 0508    heparin (Porcine) 1000 UNIT/ML injection 1,500 Units  1.5 mL Intravenous PRN Dialysis José Callahan MD        lidocaine-prilocaine (Emla) 2.5-2.5 % cream   Topical PRN José Callahan MD        bisacodyl (Dulcolax) 10 MG rectal suppository 10 mg  10 mg Rectal Daily PRN Karla Irvin APRN        chlorhexidine gluconate (Peridex) 0.12 % oral solution 15 mL  15 mL Mouth/Throat BID@0800,2000 Karla Irvin APRN   15 mL at 10/03/24 2007    propofol (Diprivan) 10 mg/mL infusion premix  5-50 mcg/kg/min (Dosing Weight) Intravenous Continuous Karla Irvin APRN 6.4 mL/hr at 10/04/24 0600 15 mcg/kg/min at 10/04/24 0600    fentaNYL in sodium chloride 0.9% (Sublimaze) 1000 mcg/100mL infusion premix   mcg/hr Intravenous Continuous  PRN Karla Irvin APRN 7.5 mL/hr at 10/04/24 0600 75 mcg/hr at 10/04/24 0600    albuterol (Ventolin HFA) 108 (90 Base) MCG/ACT inhaler 2 puff  2 puff Inhalation Q4H PRN Darlin Phipps MD        fluticasone propionate (Flonase) 50 MCG/ACT nasal suspension 2 spray  2 spray Nasal Daily PRN Darlin Phipps MD        glucose (Dex4) 15 GM/59ML oral liquid 15 g  15 g Oral Q15 Min PRN Darlin Phipps MD        Or    glucose (Glutose) 40% oral gel 15 g  15 g Oral Q15 Min PRN Darlin Phipps MD        Or    glucose-vitamin C (Dex-4) chewable tab 4 tablet  4 tablet Oral Q15 Min PRN Darlin Phipps MD        Or    dextrose 50% injection 50 mL  50 mL Intravenous Q15 Min PRN Darlin Phipps MD        Or    glucose (Dex4) 15 GM/59ML oral liquid 30 g  30 g Oral Q15 Min PRN Darlin Phipps MD        Or    glucose (Glutose) 40% oral gel 30 g  30 g Oral Q15 Min PRN Darlin Phipps MD        Or    glucose-vitamin C (Dex-4) chewable tab 8 tablet  8 tablet Oral Q15 Min PRN Darlin Phipps MD        ipratropium-albuterol (Duoneb) 0.5-2.5 (3) MG/3ML inhalation solution 3 mL  3 mL Nebulization Q6H PRN Darlin Phipps MD   3 mL at 09/28/24 2152    fluticasone-salmeterol (Advair Diskus) 250-50 MCG/ACT inhaler 1 puff  1 puff Inhalation BID Darlin Phipps MD   1 puff at 09/29/24 0834     No current Epic-ordered outpatient medications on file.       Allergies   Allergen Reactions    Adhesive Tape OTHER (SEE COMMENTS)     Severe rashes    Dust Mite Extract RASH       Family History   Problem Relation Age of Onset    Cancer Father         Kidney    Breast Cancer Mother     Diabetes Mother     Diabetes Maternal Grandmother     Diabetes Maternal Grandfather     Heart Disorder Other         Uncle     Social History     Socioeconomic History    Marital status:    Tobacco Use    Smoking status: Former     Current packs/day: 0.00     Average packs/day: 1 pack/day for 17.0 years (17.0 ttl pk-yrs)     Types:  Cigarettes     Start date: 1964     Quit date: 1981     Years since quittin.7    Smokeless tobacco: Never   Vaping Use    Vaping status: Never Used   Substance and Sexual Activity    Alcohol use: No     Alcohol/week: 0.0 standard drinks of alcohol    Drug use: No    Sexual activity: Yes     Partners: Female       Available pre-op labs reviewed.  Lab Results   Component Value Date    WBC 8.3 10/04/2024    RBC 3.29 (L) 10/04/2024    HGB 9.9 (L) 10/04/2024    HCT 30.4 (L) 10/04/2024    MCV 92.4 10/04/2024    MCH 30.1 10/04/2024    MCHC 32.6 10/04/2024    RDW 16.3 (H) 10/04/2024    .0 10/04/2024     Lab Results   Component Value Date     10/04/2024    K 3.4 (L) 10/04/2024    CL 99 10/04/2024    CO2 25.0 10/04/2024    BUN 70 (H) 10/04/2024    CREATSERUM 6.05 (H) 10/04/2024     (H) 10/04/2024    PGLU 294 (H) 10/04/2024    CA 10.1 10/04/2024          Vital Signs:  Body mass index is 24.98 kg/m².   weight is 66 kg (145 lb 8.1 oz). His temporal temperature is 98.2 °F (36.8 °C). His blood pressure is 172/63 (abnormal) and his pulse is 83. His respiration is 14 and oxygen saturation is 100%.   Vitals:    10/04/24 0300 10/04/24 0400 10/04/24 0500 10/04/24 0600   BP:       Pulse: 82 82 82 83   Resp: 14 15 14 14   Temp:  98.2 °F (36.8 °C)     TempSrc:  Temporal     SpO2: 100% 100% 100% 100%   Weight:    66 kg (145 lb 8.1 oz)        Anesthesia Evaluation     Patient summary reviewed and Nursing notes reviewed    Airway   Dental      Pulmonary    (+) COPD, asthma, sleep apnea, decreased breath sounds  Cardiovascular   Exercise tolerance: poor  (+) hypertension poorly controlled, CAD, CHF    Rhythm: regular  Rate: normal    Neuro/Psych      GI/Hepatic/Renal      Endo/Other    (+) diabetes mellitus type 1 using insulin  Abdominal      Other findings: Intubated, on ventilator.  26 cm at teeth, OG in.              Anesthesia Plan:   ASA:  4  Plan:   General  Monitors and Lines:   A-line  Airway:   ETT  Post-op Pain Management: IV analgesics  Plan Comments: Labs/notes/studies extensively reviewed.  Intubated, in ICU, very high risk for procedure.  Stephy = wife. Wonderful supportive family.  R forearm AV fistula, left radial a-line.  Feeding off at 8 PM. R14,, PEEP 5.  Nods and shakes.  Blood OK. ICU and intubated Post-op.  3 fingers on left hand.  Decreased breath sounds.   @ 5:07 AM.  D10 running.  Will check BS intra-op.  Informed Consent Plan and Risks Discussed With:  Patient, spouse and child/children  Use of Blood Products Discussed With:  Patient, spouse and child/children  Blood Product Use Consented        I have informed Ollie Hernández and/or legal guardian or family member of the nature of the anesthetic plan, benefits, risks including possible dental damage if relevant, major complications, and any alternative forms of anesthetic management.   All of the patient's questions were answered to the best of my ability. The patient desires the anesthetic management as planned.  Talha Gregg MD  10/4/2024 7:58 AM  Present on Admission:   Closed displaced midcervical fracture of left femur with delayed healing   Closed fracture of left hip (HCC)

## 2024-10-04 NOTE — PLAN OF CARE
He is going to have some swelling as he had hip surgery recently. He should use ice compresses for 10-15 minutes 3 times a day. Also should schedule follow up with his orthopedic that operated.    Pt intubated and sedated overnight. Pt abdomen distended, residuals checked, >500. TF stopped D10 started, MD aware. Plan for surgery tomorrow, pending surgeon speaks with family for consent. Bed locked in lowest position, call light within reach. Safety maintained.   Problem: Safety Risk - Non-Violent Restraints  Goal: Patient will remain free from self-harm  Description: INTERVENTIONS:  - Apply the least restrictive restraint to prevent harm  - Notify patient and family of reasons restraints applied  - Assess for any contributing factors to confusion (electrolyte disturbances, delirium, medications)  - Discontinue any unnecessary medical devices as soon as possible  - Assess the patient's physical comfort, circulation, skin condition, hydration, nutrition and elimination needs   - Reorient and redirection as needed  - Assess for the need to continue restraints  Outcome: Not Progressing     Problem: GASTROINTESTINAL - ADULT  Goal: Maintains or returns to baseline bowel function  Description: INTERVENTIONS:  - Assess bowel function  - Maintain adequate hydration with IV or PO as ordered and tolerated  - Evaluate effectiveness of GI medications  - Encourage mobilization and activity  - Obtain nutritional consult as needed  - Establish a toileting routine/schedule  - Consider collaborating with pharmacy to review patient's medication profile  Outcome: Not Progressing     Problem: Patient Centered Care  Goal: Patient preferences are identified and integrated in the patient's plan of care  Description: Interventions:  - What would you like us to know as we care for you?   - Provide timely, complete, and accurate information to patient/family  - Incorporate patient and family knowledge, values, beliefs, and cultural backgrounds into the planning and delivery of care  - Encourage patient/family to participate in care and decision-making at the level they choose  - Honor patient and family perspectives and  choices  Outcome: Progressing     Problem: Diabetes/Glucose Control  Goal: Glucose maintained within prescribed range  Description: INTERVENTIONS:  - Monitor Blood Glucose as ordered  - Assess for signs and symptoms of hyperglycemia and hypoglycemia  - Administer ordered medications to maintain glucose within target range  - Assess barriers to adequate nutritional intake and initiate nutrition consult as needed  - Instruct patient on self management of diabetes  Outcome: Progressing       Problem: PAIN - ADULT  Goal: Verbalizes/displays adequate comfort level or patient's stated pain goal  Description: INTERVENTIONS:  - Encourage pt to monitor pain and request assistance  - Assess pain using appropriate pain scale  - Administer analgesics based on type and severity of pain and evaluate response  - Implement non-pharmacological measures as appropriate and evaluate response  - Consider cultural and social influences on pain and pain management  - Manage/alleviate anxiety  - Utilize distraction and/or relaxation techniques  - Monitor for opioid side effects  - Notify MD/LIP if interventions unsuccessful or patient reports new pain  - Anticipate increased pain with activity and pre-medicate as appropriate  Outcome: Progressing     Problem: SAFETY ADULT - FALL  Goal: Free from fall injury  Description: INTERVENTIONS:  - Assess pt frequently for physical needs  - Identify cognitive and physical deficits and behaviors that affect risk of falls.  - [place] fall precautions as indicated by assessment.  - Educate pt/family on patient safety including physical limitations  - Instruct pt to call for assistance with activity based on assessment  - Modify environment to reduce risk of injury  - Provide assistive devices as appropriate  - Consider OT/PT consult to assist with strengthening/mobility  - Encourage toileting schedule  Outcome: Progressing     Problem: DISCHARGE PLANNING  Goal: Discharge to home or other facility  with appropriate resources  Description: INTERVENTIONS:  - Identify barriers to discharge w/pt and caregiver  - Include patient/family/discharge partner in discharge planning  - Arrange for needed discharge resources and transportation as appropriate  - Identify discharge learning needs (meds, wound care, etc)  - Arrange for interpreters to assist at discharge as needed  - Consider post-discharge preferences of patient/family/discharge partner  - Complete POLST form as appropriate  - Assess patient's ability to be responsible for managing their own health  - Refer to Case Management Department for coordinating discharge planning if the patient needs post-hospital services based on physician/LIP order or complex needs related to functional status, cognitive ability or social support system  Outcome: Progressing     Problem: CARDIOVASCULAR - ADULT  Goal: Maintains optimal cardiac output and hemodynamic stability  Description: INTERVENTIONS:  - Monitor vital signs, rhythm, and trends  - Monitor for bleeding, hypotension and signs of decreased cardiac output  - Evaluate effectiveness of vasoactive medications to optimize hemodynamic stability  - Monitor arterial and/or venous puncture sites for bleeding and/or hematoma  - Assess quality of pulses, skin color and temperature  - Assess for signs of decreased coronary artery perfusion - ex. Angina  - Evaluate fluid balance, assess for edema, trend weights  Outcome: Progressing  Goal: Absence of cardiac arrhythmias or at baseline  Description: INTERVENTIONS:  - Continuous cardiac monitoring, monitor vital signs, obtain 12 lead EKG if indicated  - Evaluate effectiveness of antiarrhythmic and heart rate control medications as ordered  - Initiate emergency measures for life threatening arrhythmias  - Monitor electrolytes and administer replacement therapy as ordered  Outcome: Progressing     Problem: RESPIRATORY - ADULT  Goal: Achieves optimal ventilation and  oxygenation  Description: INTERVENTIONS:  - Assess for changes in respiratory status  - Assess for changes in mentation and behavior  - Position to facilitate oxygenation and minimize respiratory effort  - Oxygen supplementation based on oxygen saturation or ABGs  - Provide Smoking Cessation handout, if applicable  - Encourage broncho-pulmonary hygiene including cough, deep breathe, Incentive Spirometry  - Assess the need for suctioning and perform as needed  - Assess and instruct to report SOB or any respiratory difficulty  - Respiratory Therapy support as indicated  - Manage/alleviate anxiety  - Monitor for signs/symptoms of CO2 retention  Outcome: Progressing

## 2024-10-05 ENCOUNTER — APPOINTMENT (OUTPATIENT)
Dept: GENERAL RADIOLOGY | Facility: HOSPITAL | Age: 79
End: 2024-10-05
Attending: NURSE PRACTITIONER
Payer: MEDICARE

## 2024-10-05 ENCOUNTER — TELEPHONE (OUTPATIENT)
Dept: CARE COORDINATION | Age: 79
End: 2024-10-05

## 2024-10-05 LAB
ANION GAP SERPL CALC-SCNC: 10 MMOL/L (ref 0–18)
BASE EXCESS BLD CALC-SCNC: 3.2 MMOL/L (ref ?–2)
BUN BLD-MCNC: 39 MG/DL (ref 9–23)
BUN/CREAT SERPL: 9.7 (ref 10–20)
CALCIUM BLD-MCNC: 8.7 MG/DL (ref 8.7–10.4)
CHLORIDE SERPL-SCNC: 99 MMOL/L (ref 98–112)
CO2 SERPL-SCNC: 26 MMOL/L (ref 21–32)
CREAT BLD-MCNC: 4.01 MG/DL
EGFRCR SERPLBLD CKD-EPI 2021: 15 ML/MIN/1.73M2 (ref 60–?)
GLUCOSE BLD-MCNC: 189 MG/DL (ref 70–99)
GLUCOSE BLDC GLUCOMTR-MCNC: 174 MG/DL (ref 70–99)
GLUCOSE BLDC GLUCOMTR-MCNC: 200 MG/DL (ref 70–99)
GLUCOSE BLDC GLUCOMTR-MCNC: 277 MG/DL (ref 70–99)
GLUCOSE BLDC GLUCOMTR-MCNC: 293 MG/DL (ref 70–99)
HCO3 BLDA-SCNC: 27.4 MEQ/L (ref 21–27)
HCT VFR BLD AUTO: 23.7 %
HGB BLD-MCNC: 7.8 G/DL
O2 CT BLD-SCNC: 11.3 VOL% (ref 15–23)
O2/TOTAL GAS SETTING VFR VENT: 35 %
OSMOLALITY SERPL CALC.SUM OF ELEC: 294 MOSM/KG (ref 275–295)
OXYGEN LITERS/MINUTE: 8 L/MIN
PCO2 BLDA: 32 MM HG (ref 35–45)
PH BLDA: 7.52 [PH] (ref 7.35–7.45)
PO2 BLDA: 169 MM HG (ref 80–100)
POTASSIUM SERPL-SCNC: 3.3 MMOL/L (ref 3.5–5.1)
PUNCTURE CHARGE: NO
SAO2 % BLDA: 97.9 % (ref 94–100)
SODIUM SERPL-SCNC: 135 MMOL/L (ref 136–145)

## 2024-10-05 PROCEDURE — 71045 X-RAY EXAM CHEST 1 VIEW: CPT | Performed by: NURSE PRACTITIONER

## 2024-10-05 PROCEDURE — 99233 SBSQ HOSP IP/OBS HIGH 50: CPT | Performed by: INTERNAL MEDICINE

## 2024-10-05 PROCEDURE — 99232 SBSQ HOSP IP/OBS MODERATE 35: CPT | Performed by: INTERNAL MEDICINE

## 2024-10-05 RX ORDER — FLUCONAZOLE 100 MG/1
100 TABLET ORAL NIGHTLY
Status: DISCONTINUED | OUTPATIENT
Start: 2024-10-06 | End: 2024-10-08

## 2024-10-05 RX ORDER — POTASSIUM CHLORIDE 1.5 G/1.58G
20 POWDER, FOR SOLUTION ORAL ONCE
Status: COMPLETED | OUTPATIENT
Start: 2024-10-05 | End: 2024-10-05

## 2024-10-05 RX ORDER — FLUCONAZOLE 200 MG/1
200 TABLET ORAL ONCE
Status: COMPLETED | OUTPATIENT
Start: 2024-10-05 | End: 2024-10-05

## 2024-10-05 RX ORDER — FLUCONAZOLE 100 MG/1
100 TABLET ORAL EVERY 24 HOURS
Status: DISCONTINUED | OUTPATIENT
Start: 2024-10-05 | End: 2024-10-05 | Stop reason: SDUPTHER

## 2024-10-05 RX ORDER — POTASSIUM CHLORIDE 1500 MG/1
20 TABLET, EXTENDED RELEASE ORAL ONCE
Status: DISCONTINUED | OUTPATIENT
Start: 2024-10-05 | End: 2024-10-05

## 2024-10-05 RX ORDER — METOCLOPRAMIDE HYDROCHLORIDE 5 MG/ML
10 INJECTION INTRAMUSCULAR; INTRAVENOUS
Status: DISCONTINUED | OUTPATIENT
Start: 2024-10-05 | End: 2024-10-22

## 2024-10-05 NOTE — PROGRESS NOTES
[facility]  part of [facility]    Progress Note    Ollie Hernández Patient Status:  Inpatient    1945 MRN [id number]   Location [facility] 2W/SW Attending Radha Gabriel MD   Hosp Day # 7 PCP Wili Parmar MD       Subjective:   Ollie Hernández is a(n) 79 year old male who I am seeing for ESRD    S/p surg yest  S/p HD with 1.5 L UF  Remains intubated  Wife at bedside    Objective:   /61 (BP Location: Left arm)   Pulse 86   Temp 97.9 °F (36.6 °C) (Temporal)   Resp 21   Ht 5' 4.02\" (1.626 m)   Wt 144 lb 10 oz (65.6 kg)   SpO2 100%   BMI 24.81 kg/m²      Intake/Output Summary (Last 24 hours) at 10/5/2024 1129  Last data filed at 10/5/2024 1000  Gross per 24 hour   Intake 1874.8 ml   Output 2000 ml   Net -125.2 ml     Wt Readings from Last 1 Encounters:   10/05/24 144 lb 10 oz (65.6 kg)       Exam  Vital signs: Blood pressure 151/61, pulse 86, temperature 97.9 °F (36.6 °C), temperature source Temporal, resp. rate 21, height 5' 4.02\" (1.626 m), weight 144 lb 10 oz (65.6 kg), SpO2 100%.    General: No acute distress.   HEENT: Moist mucous membranes. EOMI. ETT  Neck:  No JVD.   Respiratory: Clear to auscultation bilaterally.    Cardiovascular: S1, S2.  Regular rate and rhythm.   Abdomen: Soft, nontender, nondistended.    Neurologic: No focal neurological deficits.  Musculoskeletal: Full range of motion of all extremities.  No swelling noted.  Access:AVF    Results:     Recent Labs   Lab 24  0427 24  1620 10/01/24  0330 10/02/24  0450 10/03/24  0421 10/04/24  0307 10/04/24  1411 10/05/24  0338   RBC 3.24*  --  3.40* 3.06* 3.44* 3.29*  --   --    HGB 10.0*   < > 10.5* 9.3* 10.3* 9.9* 9.1* 7.8*   HCT 30.2*   < > 31.8* 27.5* 31.0* 30.4* 28.0* 23.7*   MCV 93.2  --  93.5 89.9 90.1 92.4  --   --    MCH 30.9  --  30.9 30.4 29.9 30.1  --   --    MCHC 33.1  --  33.0 33.8 33.2 32.6  --   --    RDW 16.1*  --  16.6* 16.4* 16.2* 16.3*  --   --    NEPRELIM 9.33*  --  8.99*   --   --  6.51  --   --    WBC 11.2*  --  10.7 8.1 8.5 8.3  --   --    .0*  --  148.0* 159.0 176.0 182.0  --   --     < > = values in this interval not displayed.         Recent Labs   Lab 10/03/24  0421 10/04/24  0307 10/05/24  0338   * 261* 189*   BUN 48* 70* 39*   CREATSERUM 4.67* 6.05* 4.01*   CA 10.4 10.1 8.7    137 135*   K 4.0 3.4* 3.3*    99 99   CO2 27.0 25.0 26.0          XR CHEST AP PORTABLE  (CPT=71045)    Result Date: 10/5/2024  CONCLUSION:   Mild bibasilar opacity in suspected trace right pleural effusion not significantly changed.    Dictated by (CST): Kevin Hensley MD on 10/05/2024 at 10:18 AM     Finalized by (CST): Kevin Hensley MD on 10/05/2024 at 10:19 AM          XR HIP W OR WO PELVIS 1 VIEW, LEFT (CPT=73501)    Result Date: 10/4/2024  CONCLUSION: Postsurgical changes of left hip arthroplasty with no radiographic evidence of hardware complication.     elm-remote   Dictated by (CST): Florentino Mari MD on 10/04/2024 at 3:39 PM     Finalized by (CST): Florentino Mari MD on 10/04/2024 at 3:41 PM          XR FLUOROSCOPY C-ARM TIME LESS THAN 1 HOUR (CPT=76000)    Result Date: 10/4/2024  CONCLUSION: Intraoperative fluoroscopy provided.  Please see surgical note for specific details.    Dictated by (CST): Kevin Hensley MD on 10/04/2024 at 1:59 PM     Finalized by (CST): Kevin Hensley MD on 10/04/2024 at 1:59 PM          XR ABDOMEN (1 VIEW) (CPT=74018)    Result Date: 10/3/2024  CONCLUSION:   Enteric tube within the mid body of the stomach.  Nonobstructive bowel gas pattern.  Featureless colonic bowel loops, could represent sequela of infectious/inflammatory process.      Dictated by (CST): Apple Pierre MD on 10/03/2024 at 4:03 PM     Finalized by (CST): Apple Pierre MD on 10/03/2024 at 4:09 PM           Assessment and Plan:     79 year old male with past medical history of T2DM, HTN, HLD, ESRD on HD MWF, CAD s/p PCI (5/2024), A.fib on Eliquis, HFpEF, KRAIG,  BPH, history of recurrent gastrointestinal bleeding presented to the ER after a fall sustaining a left hip fracture    Impression:    ESRD, HD Monday Wednesday Friday  Hypoxic respiratory failure, possible aspiration; now intubated  Left hip fracture, Ortho following-s/p Left anterior total hip arthroplasty with fluoroscopy. 10/4  Hypertension with ESRD  Diabetes with nephropathy  A-fib s/p watchman  History of GI bleed  Anemia, hgb is stable  Secondary hyperparathyroidism, monitor calcium and phosphorus      Plan:    HD Monday Wednesday schedule-   Cautiously replace K  Monitor h/h  Addendum  Drop in hgb  Check iron panel  Add marisela    Thank you very much for allowing me to participate in the care of your patient.  If you have any questions, please do not hesitate to contact me.     Moni Pugh MD

## 2024-10-05 NOTE — PLAN OF CARE
Pt. Drowsy, but following commands. Prop. And fent. Cont. Restraints cont. Safety precautions in place. TF off per  Orders. Electrolyte replacement done. Family @ bedside.   Problem: Patient Centered Care  Goal: Patient preferences are identified and integrated in the patient's plan of care  Description: Interventions:  - Provide timely, complete, and accurate information to patient/family  - Incorporate patient and family knowledge, values, beliefs, and cultural backgrounds into the planning and delivery of care  - Encourage patient/family to participate in care and decision-making at the level they choose  - Honor patient and family perspectives and choices  Outcome: Progressing     Problem: Diabetes/Glucose Control  Goal: Glucose maintained within prescribed range  Description: INTERVENTIONS:  - Monitor Blood Glucose as ordered  - Assess for signs and symptoms of hyperglycemia and hypoglycemia  - Administer ordered medications to maintain glucose within target range  - Assess barriers to adequate nutritional intake and initiate nutrition consult as needed  - Instruct patient on self management of diabetes  Outcome: Progressing     Problem: Patient/Family Goals  Goal: Patient/Family Long Term Goal    Interventions:  - See additional Care Plan goals for specific interventions  Outcome: Progressing  Goal: Patient/Family Short Term Goal    Interventions:   - See additional Care Plan goals for specific interventions  Outcome: Progressing     Problem: PAIN - ADULT  Goal: Verbalizes/displays adequate comfort level or patient's stated pain goal  Description: INTERVENTIONS:  - Encourage pt to monitor pain and request assistance  - Assess pain using appropriate pain scale  - Administer analgesics based on type and severity of pain and evaluate response  - Implement non-pharmacological measures as appropriate and evaluate response  - Consider cultural and social influences on pain and pain management  - Manage/alleviate  anxiety  - Utilize distraction and/or relaxation techniques  - Monitor for opioid side effects  - Notify MD/LIP if interventions unsuccessful or patient reports new pain  - Anticipate increased pain with activity and pre-medicate as appropriate  Outcome: Progressing     Problem: SAFETY ADULT - FALL  Goal: Free from fall injury  Description: INTERVENTIONS:  - Assess pt frequently for physical needs  - Identify cognitive and physical deficits and behaviors that affect risk of falls.  - [place] fall precautions as indicated by assessment.  - Educate pt/family on patient safety including physical limitations  - Instruct pt to call for assistance with activity based on assessment  - Modify environment to reduce risk of injury  - Provide assistive devices as appropriate  - Consider OT/PT consult to assist with strengthening/mobility  - Encourage toileting schedule  Outcome: Progressing     Problem: DISCHARGE PLANNING  Goal: Discharge to home or other facility with appropriate resources  Description: INTERVENTIONS:  - Identify barriers to discharge w/pt and caregiver  - Include patient/family/discharge partner in discharge planning  - Arrange for needed discharge resources and transportation as appropriate  - Identify discharge learning needs (meds, wound care, etc)  - Arrange for interpreters to assist at discharge as needed  - Consider post-discharge preferences of patient/family/discharge partner  - Complete POLST form as appropriate  - Assess patient's ability to be responsible for managing their own health  - Refer to Case Management Department for coordinating discharge planning if the patient needs post-hospital services based on physician/LIP order or complex needs related to functional status, cognitive ability or social support system  Outcome: Progressing     Problem: Safety Risk - Non-Violent Restraints  Goal: Patient will remain free from self-harm  Description: INTERVENTIONS:  - Apply the least restrictive  restraint to prevent harm  - Notify patient and family of reasons restraints applied  - Assess for any contributing factors to confusion (electrolyte disturbances, delirium, medications)  - Discontinue any unnecessary medical devices as soon as possible  - Assess the patient's physical comfort, circulation, skin condition, hydration, nutrition and elimination needs   - Reorient and redirection as needed  - Assess for the need to continue restraints  Outcome: Progressing     Problem: CARDIOVASCULAR - ADULT  Goal: Maintains optimal cardiac output and hemodynamic stability  Description: INTERVENTIONS:  - Monitor vital signs, rhythm, and trends  - Monitor for bleeding, hypotension and signs of decreased cardiac output  - Evaluate effectiveness of vasoactive medications to optimize hemodynamic stability  - Monitor arterial and/or venous puncture sites for bleeding and/or hematoma  - Assess quality of pulses, skin color and temperature  - Assess for signs of decreased coronary artery perfusion - ex. Angina  - Evaluate fluid balance, assess for edema, trend weights  Outcome: Progressing  Goal: Absence of cardiac arrhythmias or at baseline  Description: INTERVENTIONS:  - Continuous cardiac monitoring, monitor vital signs, obtain 12 lead EKG if indicated  - Evaluate effectiveness of antiarrhythmic and heart rate control medications as ordered  - Initiate emergency measures for life threatening arrhythmias  - Monitor electrolytes and administer replacement therapy as ordered  Outcome: Progressing     Problem: RESPIRATORY - ADULT  Goal: Achieves optimal ventilation and oxygenation  Description: INTERVENTIONS:  - Assess for changes in respiratory status  - Assess for changes in mentation and behavior  - Position to facilitate oxygenation and minimize respiratory effort  - Oxygen supplementation based on oxygen saturation or ABGs  - Provide Smoking Cessation handout, if applicable  - Encourage broncho-pulmonary hygiene including  cough, deep breathe, Incentive Spirometry  - Assess the need for suctioning and perform as needed  - Assess and instruct to report SOB or any respiratory difficulty  - Respiratory Therapy support as indicated  - Manage/alleviate anxiety  - Monitor for signs/symptoms of CO2 retention  Outcome: Progressing     Problem: GASTROINTESTINAL - ADULT  Goal: Maintains or returns to baseline bowel function  Description: INTERVENTIONS:  - Assess bowel function  - Maintain adequate hydration with IV or PO as ordered and tolerated  - Evaluate effectiveness of GI medications  - Encourage mobilization and activity  - Obtain nutritional consult as needed  - Establish a toileting routine/schedule  - Consider collaborating with pharmacy to review patient's medication profile  Outcome: Progressing

## 2024-10-05 NOTE — PLAN OF CARE
Patient received mechanically ventilated on full vent support. SBT was attempted this morning with rapid shallow breathing noted, intermittent RSBI of >105 .  Patient was therefore placed back on SBT. Per MD T-piece trial done. While on trial periods of apnea were noted. ABG was obtained with respiratory alkalosis seen. Patient was placed back on full support with plans for SBT tomorrow morning.      Problem: RESPIRATORY - ADULT  Goal: Achieves optimal ventilation and oxygenation  Description: INTERVENTIONS:  - Assess for changes in respiratory status  - Assess for changes in mentation and behavior  - Position to facilitate oxygenation and minimize respiratory effort  - Oxygen supplementation based on oxygen saturation or ABGs  - Provide Smoking Cessation handout, if applicable  - Encourage broncho-pulmonary hygiene including cough, deep breathe, Incentive Spirometry  - Assess the need for suctioning and perform as needed  - Assess and instruct to report SOB or any respiratory difficulty  - Respiratory Therapy support as indicated  - Manage/alleviate anxiety  - Monitor for signs/symptoms of CO2 retention  Outcome: Progressing

## 2024-10-05 NOTE — PROGRESS NOTES
Orthopaedic Surgery Inpatient Consult Progress Note  _______________________________________________________________________________________________________________  _______________________________________________________________________________________________________________    Ollie Hernández Patient Status:  Inpatient    1945 MRN [id number]   Location [facility] 2W/SW Attending Lizbeth Rey MD     DATE: 10/5/2024     HISTORY OF PRESENT ILLNESS: Ollie Hernández is a 79 year old male who presented as a consult to the Orthopaedic Surgery service for L femoral neck fx. He is now s/p L WENDY on 10/4.     S/24H EVENTS: Pain moderately well controlled overnight. Denies any overnight issues.     PHYSICAL EXAM  /59 (BP Location: Left arm)   Pulse 83   Temp 97.9 °F (36.6 °C) (Temporal)   Resp 25   Ht 5' 4.02\" (1.626 m)   Wt 144 lb 10 oz (65.6 kg)   SpO2 100%   BMI 24.81 kg/m²      Constitutional: The patient is well-developed, well-nourished, in no acute distress.  Neurological: Unable to assess  Cardiovascular: regular rate by palpation  Pulmonary/Chest: Airway supported with ET tube  Left  Leg  INSPECTION/PALPATION  Dressings c/d/i  Mild  swelling about thigh  ROM  deferred   MOTOR  Intact to TA/GSC/peroneals  SILT Sharad/Saph/SPN/DPN/T  1+ DP/PT pulse, brisk capillary refill     LAB RESULTS  Lab Results   Component Value Date    WBC 8.3 10/04/2024    HGB 7.8 (L) 10/05/2024    HCT 23.7 (L) 10/05/2024    .0 10/04/2024    CREATSERUM 4.01 (H) 10/05/2024    BUN 39 (H) 10/05/2024     (L) 10/05/2024    K 3.3 (L) 10/05/2024    CL 99 10/05/2024    CO2 26.0 10/05/2024     (H) 10/05/2024    CA 8.7 10/05/2024    ALB 3.9 10/01/2024    ALKPHO 63 10/01/2024    BILT 1.2 (H) 10/01/2024    TP 7.0 10/01/2024    AST 22 10/01/2024    ALT 9 (L) 10/01/2024    PTT 30.1 2024    INR 1.45 (H) 2024    T4F 0.9 2022    TSH 2.844 2024     (H) 2024    PSA 2.94  10/20/2021    DDIMER 6.07 (H) 09/29/2024    ESRML 10 06/16/2024    CRP 1.30 (H) 06/16/2024    MG 2.2 10/02/2024    PHOS 5.6 (H) 09/30/2024    TROP <0.045 07/25/2019     08/05/2023    B12 672 07/04/2024       IMAGING  I independently viewed and interpreted the imaging. Radiologist interpretation is available in the imaging report.  X-ray: Plain films of the left hip demonstrate postop changes consistent with L WENDY. No fx, dislocations, or complications apparent    XR CHEST AP PORTABLE  (CPT=71045)    Result Date: 10/5/2024  CONCLUSION:   Mild bibasilar opacity in suspected trace right pleural effusion not significantly changed.    Dictated by (CST): Kevin Hensley MD on 10/05/2024 at 10:18 AM     Finalized by (CST): Kevin Hensley MD on 10/05/2024 at 10:19 AM          XR HIP W OR WO PELVIS 1 VIEW, LEFT (CPT=73501)    Result Date: 10/4/2024  CONCLUSION: Postsurgical changes of left hip arthroplasty with no radiographic evidence of hardware complication.     elm-remote   Dictated by (CST): Florentino Mari MD on 10/04/2024 at 3:39 PM     Finalized by (CST): Florentino Mari MD on 10/04/2024 at 3:41 PM          XR FLUOROSCOPY C-ARM TIME LESS THAN 1 HOUR (CPT=76000)    Result Date: 10/4/2024  CONCLUSION: Intraoperative fluoroscopy provided.  Please see surgical note for specific details.    Dictated by (CST): Kevin Hensley MD on 10/04/2024 at 1:59 PM     Finalized by (CST): Kevin Hensley MD on 10/04/2024 at 1:59 PM          XR ABDOMEN (1 VIEW) (CPT=74018)    Result Date: 10/3/2024  CONCLUSION:   Enteric tube within the mid body of the stomach.  Nonobstructive bowel gas pattern.  Featureless colonic bowel loops, could represent sequela of infectious/inflammatory process.      Dictated by (CST): Apple Pierre MD on 10/03/2024 at 4:03 PM     Finalized by (CST): Apple Pierre MD on 10/03/2024 at 4:09 PM               IMPRESSIONS/RECOMMENDATIONS: Ollie Hernández is a 79 year old male who presents as a  consult to the Orthopaedic surgery service for L femoral neck fx now POD1 s/p L WENDY. He is overall recovering fairly well    Discussed the history, physical exam, treatment to date, and reviewed relevant imaging an studies with the patient.  SURGICAL PLANS: None at this time  ABX: 24H postop  PAIN: Continue to wean/titrate to appropriate oral regimen  WEIGHT BEARING STATUS: Weightbearing as tolerated  RANGE-OF-MOTION LIMITATIONS: as tolerated  IMAGING: None    I have personally seen Ollie Moykami and discussed in detail their plan of care. Thank you for allowing me to participate in the care of your patient.   Please note that this note was written in combination with voice recognition/dictation software and there is a possibility of transcription errors which were not identified at the time of note submission. If clarification is necessary, please contact the author or clinic staff.    Anil Pang MD  Orthopaedic Surgery  10/5/2024

## 2024-10-05 NOTE — PROGRESS NOTES
[facility]  part of [facility]    Cardiology Progress Note    Ollie Hernández Patient Status:  Inpatient    1945 MRN [id number]   Location [facility] 2W/SW Attending Pranay Michel MD   Hosp Day # 7 PCP Wili Parmar MD       Impression/Plan:  79 year old male presenting with:    Hip fracture Left - S/P L anterior total hip arthroplasty 10/4/24  Acute resp failure, aspiration and/or pulmonary edema; intubated 2024  -Weaning trial underway  ESRD on HD  DM  PAF, currently SR. S/p watchman device 24  HTN  HL, on statin PTA  Acute on chronic diastolic CHF  CAD s/p [place] circ 24   Coffee grounds in OG tube  -Slight decline in Hb post-op  Elevated troponin, likely demand ischemia    - Cont carvedilol for rate control  - Cont statin  - Asa/plavix on hold (recent Watchman implant 2024); resume as safely able.    - HD as per nephrology  - Vent management as per pulm.  Weaning trial underway.  - Monitor on tele  - Will follow reviewed w/ family at bedside      Subjective:   Intubated  Less sedate with weaning trial.  Appears comfortable.          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)    Atherosclerosis of native arteries of extremities with intermittent claudication, bilateral legs (HCC)    Primary hypertension    Smokers' cough (HCC)    Unstable angina (HCC)    Lower GI bleed    ESRD (end stage renal disease) on dialysis (HCC)    PAF (paroxysmal atrial  fibrillation) (HCC)    AVM (arteriovenous malformation) of colon    ABLA (acute blood loss anemia)    Rectal bleeding    Anticoagulated    Antiplatelet or antithrombotic long-term use    Lower GI bleeding    ESRD on hemodialysis (HCC)    GI bleed    Closed displaced midcervical fracture of left femur with delayed healing    ESRD (end stage renal disease) (HCC)    Closed fracture of left hip (HCC)       Objective:   Temp: 97.9 °F (36.6 °C)  Pulse: 76  Resp: 17  AO: 145/54  FiO2 (%): 30 %    Intake/Output:     Intake/Output Summary (Last 24 hours) at 10/5/2024 0907  Last data filed at 10/5/2024 0840  Gross per 24 hour   Intake 2870 ml   Output 2550 ml   Net 320 ml       Last 3 Weights   10/05/24 0600 144 lb 10 oz (65.6 kg)   10/04/24 0600 145 lb 8.1 oz (66 kg)   10/02/24 1310 156 lb 4.9 oz (70.9 kg)   10/02/24 0800 143 lb 11.8 oz (65.2 kg)   10/01/24 0600 160 lb 0.9 oz (72.6 kg)   09/30/24 0400 164 lb 0.4 oz (74.4 kg)   09/28/24 1649 155 lb 11.2 oz (70.6 kg)   08/22/24 1311 159 lb 12.8 oz (72.5 kg)   08/20/24 0933 158 lb (71.7 kg)       Tele: NSR    Physical Exam:    General: Intubated, sedated  HEENT: Normocephalic, anicteric sclera ET/OG tube   Neck: supple  Cardiac: Regular rate and rhythm. S1, S2 normal. No murmur, pericardial rub, S3, thrill, heave or extra cardiac sounds.  Lungs: Coarse breath sounds bilat  Abdomen: Soft, non-distended  Extremities: Without clubbing, cyanosis or edema.  Peripheral pulses are 2+. SCD boots   Neurologic: Intubated  Skin: Warm and dry.     Laboratory/Data:    Labs:         Recent Labs   Lab 09/30/24  0427 09/30/24  1620 10/01/24  0330 10/02/24  0450 10/03/24  0421 10/04/24  0307 10/04/24  1411 10/05/24  0338   WBC 11.2*  --  10.7 8.1 8.5 8.3  --   --    HGB 10.0*   < > 10.5* 9.3* 10.3* 9.9* 9.1* 7.8*   MCV 93.2  --  93.5 89.9 90.1 92.4  --   --    .0*  --  148.0* 159.0 176.0 182.0  --   --     < > = values in this interval not displayed.       Recent Labs   Lab  09/29/24  0438 09/29/24 0755 09/30/24 0427 10/01/24  0330 10/02/24  0437 10/03/24  0421 10/04/24  0307 10/05/24  0338      < > 139 139 137 140 137 135*   K 4.3   < > 3.5 3.5 3.4* 4.0 3.4* 3.3*      < > 98 99 98 100 99 99   CO2 26.0   < > 29.0 29.0 24.0 27.0 25.0 26.0   BUN 59*   < > 55* 36* 61* 48* 70* 39*   CREATSERUM 5.55*   < > 4.88* 3.98* 5.88* 4.67* 6.05* 4.01*   CA 9.5   < > 9.9 9.8 9.9 10.4 10.1 8.7   MG 1.8  --  1.8  --  2.2  --   --   --    PHOS 6.4*  --  5.6*  --   --   --   --   --    *   < > 117* 135* 140* 215* 261* 189*    < > = values in this interval not displayed.       Recent Labs   Lab 09/28/24  1204 09/29/24  0755 10/01/24  0330   ALT 17 13 9*   AST 30 23 22   ALB 3.9 3.7 3.9       No results for input(s): \"TROP\" in the last 168 hours.    Allergies:   Allergies   Allergen Reactions    Adhesive Tape OTHER (SEE COMMENTS)     Severe rashes    Dust Mite Extract RASH       Medications:  Current Facility-Administered Medications   Medication Dose Route Frequency    sodium chloride 0.9 % irrigation    Continuous PRN    sodium chloride 0.9 % irrigation    Continuous PRN    sodium chloride 0.9 % IV bolus 500 mL  500 mL Intravenous Once PRN    ondansetron (Zofran) 4 MG/2ML injection 4 mg  4 mg Intravenous Q6H PRN    metoclopramide (Reglan) 5 mg/mL injection 10 mg  10 mg Intravenous Q8H PRN    diphenhydrAMINE (Benadryl) cap/tab 25 mg  25 mg Oral Q4H PRN    Or    diphenhydrAMINE (Benadryl) 50 mg/mL  injection 12.5 mg  12.5 mg Intravenous Q4H PRN    heparin (Porcine) 5000 UNIT/ML injection 5,000 Units  5,000 Units Subcutaneous Q8H STEPHANIE    dextrose 10% infusion (TPN no rate)   Intravenous Continuous PRN    pancrelipase (Lip-Prot-Amyl) (Zenpep) DR particles cap 10,000 Units  10,000 Units Per G Tube PRN    And    sodium bicarbonate tab 325 mg  325 mg Oral PRN    sodium chloride 0.9 % IV bolus 100 mL  100 mL Intravenous Q30 Min PRN    And    albumin human (Albumin) 25% injection 25 g  25 g  Intravenous PRN Dialysis    senna (Senokot) 8.8 MG/5ML oral syrup 17.6 mg  10 mL Oral BID    docusate (Colace) 50 MG/5ML oral solution 100 mg  100 mg Oral BID    mineral oil-white petrolatum (Artificial Tears) 83-15 % ophthalmic ointment 1 Application  1 Application Both Eyes Nightly    norepinephrine (Levophed) 4 mg/250mL infusion premix  0.5-30 mcg/min Intravenous Continuous    lidocaine-menthol 4-1 % patch 2 patch  2 patch Transdermal Daily    dexmedeTOMIDine in sodium chloride 0.9% (Precedex) 400 mcg/100mL infusion premix  0.2-1.5 mcg/kg/hr (Dosing Weight) Intravenous Continuous    fentaNYL (Sublimaze) 25 mcg BOLUS FROM BAG infusion  25 mcg Intravenous PRN    pantoprazole (Protonix) 40 mg in sodium chloride 0.9% PF 10 mL IV push  40 mg Intravenous Q12H    hydrALAzine (Apresoline) 20 mg/mL injection 10 mg  10 mg Intravenous Q6H PRN    labetalol (Trandate) 5 mg/mL injection 10 mg  10 mg Intravenous Q4H PRN    cetirizine (ZyrTEC) tab 5 mg  5 mg Per NG Tube Daily    carvedilol (Coreg) tab 25 mg  25 mg Per NG Tube BID    atorvastatin (Lipitor) tab 40 mg  40 mg Per NG Tube Nightly    acetaminophen (Tylenol) 160 MG/5ML oral liquid 500 mg  500 mg Per NG Tube Daily PRN    acetaminophen (Tylenol) tab 650 mg  650 mg Oral Q4H PRN    Or    acetaminophen (Tylenol) 160 MG/5ML oral liquid 650 mg  650 mg Per NG Tube Q4H PRN    Or    acetaminophen (Tylenol) rectal suppository 650 mg  650 mg Rectal Q4H PRN    Or    acetaminophen (Ofirmev) 10 mg/mL infusion premix 1,000 mg  1,000 mg Intravenous Q6H PRN    polyethylene glycol (PEG 3350) (Miralax) 17 g oral packet 17 g  17 g Per NG Tube Daily PRN    insulin aspart (NovoLOG) 100 Units/mL FlexPen 1-7 Units  1-7 Units Subcutaneous q6h    heparin (Porcine) 1000 UNIT/ML injection 1,500 Units  1.5 mL Intravenous PRN Dialysis    lidocaine-prilocaine (Emla) 2.5-2.5 % cream   Topical PRN    bisacodyl (Dulcolax) 10 MG rectal suppository 10 mg  10 mg Rectal Daily PRN    chlorhexidine gluconate  (Peridex) 0.12 % oral solution 15 mL  15 mL Mouth/Throat BID@0800,2000    propofol (Diprivan) 10 mg/mL infusion premix  5-50 mcg/kg/min (Dosing Weight) Intravenous Continuous    fentaNYL in sodium chloride 0.9% (Sublimaze) 1000 mcg/100mL infusion premix   mcg/hr Intravenous Continuous PRN    albuterol (Ventolin HFA) 108 (90 Base) MCG/ACT inhaler 2 puff  2 puff Inhalation Q4H PRN    fluticasone propionate (Flonase) 50 MCG/ACT nasal suspension 2 spray  2 spray Nasal Daily PRN    glucose (Dex4) 15 GM/59ML oral liquid 15 g  15 g Oral Q15 Min PRN    Or    glucose (Glutose) 40% oral gel 15 g  15 g Oral Q15 Min PRN    Or    glucose-vitamin C (Dex-4) chewable tab 4 tablet  4 tablet Oral Q15 Min PRN    Or    dextrose 50% injection 50 mL  50 mL Intravenous Q15 Min PRN    Or    glucose (Dex4) 15 GM/59ML oral liquid 30 g  30 g Oral Q15 Min PRN    Or    glucose (Glutose) 40% oral gel 30 g  30 g Oral Q15 Min PRN    Or    glucose-vitamin C (Dex-4) chewable tab 8 tablet  8 tablet Oral Q15 Min PRN    ipratropium-albuterol (Duoneb) 0.5-2.5 (3) MG/3ML inhalation solution 3 mL  3 mL Nebulization Q6H PRN    fluticasone-salmeterol (Advair Diskus) 250-50 MCG/ACT inhaler 1 puff  1 puff Inhalation BID

## 2024-10-05 NOTE — OCCUPATIONAL THERAPY NOTE
Pt intubated and restrained. Will complete current orders. Please reorder once appropriate for OT.      Wanda Morales OT  [facility]  Inpatient Rehabilitation  Occupational Therapy  [phone]

## 2024-10-05 NOTE — PROGRESS NOTES
Received PT intubated with a (8) ETT secured at (26) on vent with the following settings; BS heard bilaterally, SXN provided as needed. No changes made, RT to continue to monitor.        10/05/24 0303   Vent Information   Vent Mode VC/AC   Settings   FiO2 (%) 30 %   Resp Rate (Set) 14   Vt (Set, mL) 500 mL   Waveform Decelerating ramp   PEEP/CPAP (cm H2O) 5 cm H20   Peak Flow LPM 60   Trigger Sensitivity Flow (L/min) 1 L/min   Humidification Heater   H2O Bag Level 1/2 Full   Heater Temperature 98.6 °F (37 °C)   Readings   Total RR 14   Minute Ventilation (L/min) 6.6 L/min   Expiratory Tidal Volume 480 mL   PIP Observed (cm H2O) 27 cm H2O   MAP (cm H2O) 9   Plateau Pressure (cm H2O) 22 cm H2O   Static Compliance (L/cm H2O) 27   Dynamic Compliance (L/cm H2O) 21 L/cm H2O

## 2024-10-05 NOTE — PLAN OF CARE
Problem: Safety Risk - Non-Violent Restraints  Goal: Patient will remain free from self-harm  Description: INTERVENTIONS:  - Apply the least restrictive restraint to prevent harm  - Notify patient and family of reasons restraints applied  - Assess for any contributing factors to confusion (electrolyte disturbances, delirium, medications)  - Discontinue any unnecessary medical devices as soon as possible  - Assess the patient's physical comfort, circulation, skin condition, hydration, nutrition and elimination needs   - Reorient and redirection as needed  - Assess for the need to continue restraints  10/5/2024 0726 by Gabriela Donnelly, RN  Outcome: Progressing  10/5/2024 0253 by Gabriela Donnelly, RN  Outcome: Progressing

## 2024-10-05 NOTE — PROGRESS NOTES
[facility] Pulmonary, Critical Care and Sleep    Ollie Hernández Patient Status:  Inpatient    1945 MRN [id number]   Location [facility] 2W/SW Attending Lizbeth Rey MD   Hosp Day # 7 PCP Wili Parmar MD     Date of Admission: 2024 12:00 PM    Admission Diagnosis: Closed fracture of left hip, initial encounter ([facility]) [S72.002A]    S: s/p L [place] yesterday. During weaning did have some apneic periods.     Scheduled Medications:     heparin  5,000 Units Subcutaneous Q8H STEPHANIE    senna  10 mL Oral BID    docusate  100 mg Oral BID    mineral oil-white petrolatum  1 Application Both Eyes Nightly    lidocaine-menthol  2 patch Transdermal Daily    pantoprazole  40 mg Intravenous Q12H    cetirizine  5 mg Per NG Tube Daily    carvedilol  25 mg Per NG Tube BID    atorvastatin  40 mg Per NG Tube Nightly    insulin aspart  1-7 Units Subcutaneous q6h    chlorhexidine gluconate  15 mL Mouth/Throat BID@0800,2000    fluticasone-salmeterol  1 puff Inhalation BID       Infusing Medications:     sodium chloride      sodium chloride      dextrose 10% 45 mL/hr at 10/04/24 2035    norepinephrine Stopped (10/02/24 1546)    propofol Stopped (10/05/24 0840)    fentanyl 50 mcg/hr (10/05/24 1000)       PRN Medications:    sodium chloride    sodium chloride    sodium chloride    ondansetron    metoclopramide    diphenhydrAMINE **OR** diphenhydrAMINE    dextrose 10%    lipase-protease-amylase (Lip-Prot-Amyl) **AND** sodium bicarbonate    sodium chloride **AND** albumin human    fentaNYL (Sublimaze)    hydrALAzine    labetalol    acetaminophen    acetaminophen **OR** acetaminophen **OR** acetaminophen **OR** acetaminophen    polyethylene glycol (PEG 3350)    heparin    lidocaine-prilocaine    bisacodyl    fentanyl    albuterol    fluticasone propionate    glucose **OR** glucose **OR** glucose-vitamin C **OR** dextrose **OR** glucose **OR** glucose **OR** glucose-vitamin C    ipratropium-albuterol    OBJECTIVE:  BP (!) 172/63  (BP Location: Left arm)   Pulse 76   Temp 97.9 °F (36.6 °C) (Temporal)   Resp 17   Ht 162.6 cm (5' 4.02\")   Wt 144 lb 10 oz (65.6 kg)   SpO2 100%   BMI 24.81 kg/m²    Temp (24hrs), Av.3 °F (36.8 °C), Min:97.4 °F (36.3 °C), Max:99.6 °F (37.6 °C)      Vent Mode: VC/AC  FiO2 (%):  [30 %] 30 %  S RR:  [14] 14  S VT:  [500 mL] 500 mL  PEEP/CPAP (cm H2O):  [5 cm H20-55 cm H20] 55 cm H20  MAP (cm H2O):  [9-14] 10      Wt Readings from Last 3 Encounters:   10/05/24 144 lb 10 oz (65.6 kg)   24 159 lb 12.8 oz (72.5 kg)   24 158 lb (71.7 kg)       I/O last 3 completed shifts:  In: 3090.1 [I.V.:2720.1; NG/GT:60; IV PIGGYBACK:310]  Out: 3050 [Emesis/NG output:1050; Other:1500; Blood:500]  I/O this shift:  In: 161 [I.V.:161]  Out: -      General: intubated and NAD.  Neuro: Sedated. Follows commands and moves all extremities.   HEENT: PERRL  Neck : No LAD  CV: RRR, nl S1, S2, no S4, S3 or murmur.   Lungs: Clear   Abd: Nontender, non distended.    Ext: No edema.   Skin: No rashes.     Recent Labs   Lab 10/02/24  0450 10/03/24  0421 10/04/24  0307 10/04/24  1411 10/05/24  0338   WBC 8.1 8.5 8.3  --   --    HGB 9.3* 10.3* 9.9* 9.1* 7.8*   HCT 27.5* 31.0* 30.4* 28.0* 23.7*   .0 176.0 182.0  --   --      Recent Labs   Lab 24  1204 24  0755 09/30/24  0427 10/01/24  0330 10/02/24  0437 10/03/24  0421 10/04/24  0307 10/05/24  0338   *   < > 176*   < > 135*   < > 215* 261* 189*   BUN 59*   < > 62*   < > 36*   < > 48* 70* 39*   CREATSERUM 4.85*   < > 5.38*   < > 3.98*   < > 4.67* 6.05* 4.01*   CA 10.2   < > 9.5   < > 9.8   < > 10.4 10.1 8.7      < > 140   < > 139   < > 140 137 135*   K 3.9   < > 4.4   < > 3.5   < > 4.0 3.4* 3.3*      < > 104   < > 99   < > 100 99 99   CO2 31.0   < > 25.0   < > 29.0   < > 27.0 25.0 26.0   AST 30  --  23  --  22  --   --   --   --    ALT 17  --  13  --  9*  --   --   --   --    ALB 3.9  --  3.7  --  3.9  --   --   --   --     < > =  values in this interval not displayed.     No results for input(s): \"INR\", \"PTT\" in the last 168 hours.  Recent Labs   Lab 09/29/24  1610   ABGPHT 7.43   CZCPRE7H 48*   CRBDU4I 71*   ABGHCO3 29.9*   SITE Arterial Line       COVID-19 Lab Results    COVID-19  Lab Results   Component Value Date    COVID19 Not Detected 02/16/2023    COVID19 Not Detected 01/15/2022    COVID19 Not Detected 12/18/2021       Pro-Calcitonin  No results for input(s): \"PCT\" in the last 168 hours.    Cardiac  No results for input(s): \"TROP\", \"PBNP\" in the last 168 hours.    Creatinine Kinase  No results for input(s): \"CK\" in the last 168 hours.    Inflammatory Markers  Recent Labs   Lab 09/29/24  0753   DDIMER 6.07*       Imaging:   CXR 10/3:    CXR 10/5/2024 pending  Chest images personally reviewed.       Assessment/Plan   Acute respiratory failure - due to pulmonary edema and/or aspiration. Intubated 9/29. CXR with interval improvement  - continue full vent support  - SBT after surgery today  - minimize sedation as able  - empiric Zosyn completes today for five day course  - volume management with HD  ESRD   - HD per nephrology  KRAIG  - on CPAP at home  Asthma  - Advair in lieu of Symbicort once extubated  Coffee ground output from OGT - resolved  - IV Protonix BID  Anemia - due to above  - transfuse to keep hgb >7  Hip fracture after fall  - surgery is planned for today; patient is high risk from pulmonary perspective for surgery  DM  - SSI  FEN  - TFs on hold  Ppx  - subcu heparin on hold  - PPI  Dispo  - ICU  Critical Care Time greater than: 35 minutes    My best regards,         Ollie Santos MD  [facility] Pulmonary, Critical Care and Sleep Medicine

## 2024-10-05 NOTE — PROGRESS NOTES
PT evaluation orders received and chart reviewed. Patient currently intubated and on restraints s/p ortho surgery on 10/4. Will require updated PT activity orders when medically stable    Thank you,  Aleida Pope, PT, DPT

## 2024-10-05 NOTE — PROGRESS NOTES
[facility]  part of [facility]    Progress Note    Ollie Hernández Patient Status:  Inpatient    1945 MRN [id number]   Location [facility] 2W/SW Attending Pranay Michel MD   Hosp Day # 7 PCP Wili Parmar MD     Chief Complaint:   Chief Complaint   Patient presents with    Fall       Subjective:   Ollie Hernández   Status post left hip arthroplasty on 10/4/2024.  Patient denying any pain at this time.  Patient is alert and intubated responding with nods in understanding questions appropriately.  Wife is in room.  Weaning trial underway.    No events overnght  Objective:   Objective:    Blood pressure (!) 172/63, pulse 76, temperature 97.9 °F (36.6 °C), temperature source Temporal, resp. rate 17, height 5' 4.02\" (1.626 m), weight 144 lb 10 oz (65.6 kg), SpO2 100%.    Physical Exam:    General: Intubated, alert to be comprehending questions correctly.  HEENT: Normocephalic, anicteric sclera ET/OG tube   Neck: supple  Cardiac: Regular rate and rhythm. S1, S2 normal. No murmurs thrills or rubs  Lungs: Coarse breath sounds bilat  Abdomen: Soft, non-distended  Extremities: Without clubbing, cyanosis or edema.   Skin: Warm and dry.       Results:   Results:    Labs:  Recent Labs   Lab 24  0427 24  1620 10/01/24  0330 10/02/24  0450 10/03/24  0421 10/04/24  0307 10/04/24  1411 10/05/24  0338   WBC 11.2*  --  10.7 8.1 8.5 8.3  --   --    HGB 10.0*   < > 10.5* 9.3* 10.3* 9.9* 9.1* 7.8*   MCV 93.2  --  93.5 89.9 90.1 92.4  --   --    .0*  --  148.0* 159.0 176.0 182.0  --   --     < > = values in this interval not displayed.       Recent Labs   Lab 24  1204 24  0438 24  0755 24  0427 10/01/24  0330 10/02/24  0437 10/03/24  0421 10/04/24  0307 10/05/24  0338   *   < > 176*   < > 135*   < > 215* 261* 189*   BUN 59*   < > 62*   < > 36*   < > 48* 70* 39*   CREATSERUM 4.85*   < > 5.38*   < > 3.98*   < > 4.67* 6.05* 4.01*   CA 10.2   < > 9.5    < > 9.8   < > 10.4 10.1 8.7   ALB 3.9  --  3.7  --  3.9  --   --   --   --       < > 140   < > 139   < > 140 137 135*   K 3.9   < > 4.4   < > 3.5   < > 4.0 3.4* 3.3*      < > 104   < > 99   < > 100 99 99   CO2 31.0   < > 25.0   < > 29.0   < > 27.0 25.0 26.0   ALKPHO 75  --  75  --  63  --   --   --   --    AST 30  --  23  --  22  --   --   --   --    ALT 17  --  13  --  9*  --   --   --   --    BILT 0.5  --  0.7  --  1.2*  --   --   --   --    TP 7.2  --  7.2  --  7.0  --   --   --   --     < > = values in this interval not displayed.       Estimated Creatinine Clearance: 12.9 mL/min (A) (based on SCr of 4.01 mg/dL (H)).    No results for input(s): \"PTP\", \"INR\" in the last 168 hours.         Culture:  Hospital Encounter on 09/28/24   1. Blood Culture     Status: None (Preliminary result)    Collection Time: 09/30/24 10:03 AM    Specimen: Bld,Arterial Line; Blood   Result Value Ref Range    Blood Culture Result No Growth 4 Days N/A       Cardiac  No results for input(s): \"TROP\", \"PBNP\" in the last 168 hours.      Imaging: Imaging data reviewed in Epic.  XR HIP W OR WO PELVIS 1 VIEW, LEFT (CPT=73501)    Result Date: 10/4/2024  CONCLUSION: Postsurgical changes of left hip arthroplasty with no radiographic evidence of hardware complication.     elm-remote   Dictated by (CST): Florentino Mari MD on 10/04/2024 at 3:39 PM     Finalized by (CST): Florentino Mari MD on 10/04/2024 at 3:41 PM          XR FLUOROSCOPY C-ARM TIME LESS THAN 1 HOUR (CPT=76000)    Result Date: 10/4/2024  CONCLUSION: Intraoperative fluoroscopy provided.  Please see surgical note for specific details.    Dictated by (CST): Kevin Hensley MD on 10/04/2024 at 1:59 PM     Finalized by (CST): Kevin Hensley MD on 10/04/2024 at 1:59 PM          XR ABDOMEN (1 VIEW) (CPT=74018)    Result Date: 10/3/2024  CONCLUSION:   Enteric tube within the mid body of the stomach.  Nonobstructive bowel gas pattern.  Featureless colonic bowel loops, could  represent sequela of infectious/inflammatory process.      Dictated by (CST): Apple Pierre MD on 10/03/2024 at 4:03 PM     Finalized by (CST): Apple Pierre MD on 10/03/2024 at 4:09 PM           Medications:    heparin  5,000 Units Subcutaneous Q8H STEPHANIE    senna  10 mL Oral BID    docusate  100 mg Oral BID    mineral oil-white petrolatum  1 Application Both Eyes Nightly    lidocaine-menthol  2 patch Transdermal Daily    pantoprazole  40 mg Intravenous Q12H    cetirizine  5 mg Per NG Tube Daily    carvedilol  25 mg Per NG Tube BID    atorvastatin  40 mg Per NG Tube Nightly    insulin aspart  1-7 Units Subcutaneous q6h    chlorhexidine gluconate  15 mL Mouth/Throat BID@0800,2000    fluticasone-salmeterol  1 puff Inhalation BID         Assessment and Plan:   Assessment & Plan:      Acute hypoxic respiratory failure   Hypotension   Aspiration PNA  Pulmonary and cardiology on consult.   Secondary to pulmonary edema and aspiration  Intubated 9/29 - cont full vent support.   Empiric zosyn.   Sputum cx normal julio c. Blood cx x 4 NGTD  Daily SBT.   Lactic acid normal. WBC trending down   Volume management per nephrology   CXR pulmonary edema. Improved alveolar opacities.   Wean pressors as able to keep MAP > 65 or sBP > 90   10/5: Continue to attempt weaning trial today.  Questionable thrush on exam?  Start p.o. fluconazole through OG  -If able to extubate today will have speech evaluate for possible advancement of diet.  If not we will resume tube feeds at low volume to prevent any distress.      ESRD   HD per nephrology MWF  Nephro on consult.     Hip fracture   S/p fall PTA -status post left hip arthroplasty on 10/4/2024  Orthopedic surgery on consult.   High risk for surgery  however surgery is necessary.   DM II   A1c   ISS     Paroxysmal A.fib - currently NSR   Acute on chronic HFpEF  CAD s/p jodi circ 5/24'   Elevate troponin secondary to demand ischemia   S/p watchman device 9/11/24  Cont coreg, statin  No  further cardiac testing prior to planned surgery.   Resume ASA/Plavix once safely able to do so.     Coffee ground emesis   Resolved.   IV protonix BID   Hemoglobin downtrending postoperatively from 9.1-7.8.  Continue to monitor closely       >55min spent, >50% spent counseling and coordinating care in the form of educating pt/family and d/w consultants and staff. Most of the time spent discussing the above plan.        Plan of care discussed with patient or family at bedside.    Lizbeth Rey MD            Supplementary Documentation:     Quality:  DVT Prophylaxis: heparin   CODE status: Full  Dispo: per clinical course            Estimated date of discharge: TBD  Discharge is dependent on: clinical stability  At this point Mr. Hernández is expected to be discharge to: TBD

## 2024-10-05 NOTE — OPERATIVE REPORT
[facility]    PATIENT'S NAME: MELISSA ISRAEL   ATTENDING PHYSICIAN: Lizbeth Rey MD   OPERATING PHYSICIAN: Humberto Murphy MD   PATIENT ACCOUNT#:   [id number]    LOCATION:  [address] [place] [address]  MEDICAL RECORD #:   [id number]       YOB: 1945  ADMISSION DATE:       09/28/2024      OPERATION DATE:  10/04/2024    OPERATIVE REPORT      PREOPERATIVE DIAGNOSIS:    1.   Closed, displaced left femoral neck fracture with delayed healing.  2.   Age-related osteoporosis associated with current pathologic fracture of left hip and delayed healing.  PREOPERATIVE DIAGNOSIS:    1.   Closed, displaced left femoral neck fracture with delayed healing.  2.   Age-related osteoporosis associated with current pathologic fracture of left hip and delayed healing.  POSTOPERATIVE DIAGNOSIS:  Left anterior total hip arthroplasty with fluoroscopy.    ASSISTANT:  First assistant, Tanya Rivas PA-C.  Second assistant, ALIX Rai    ANESTHESIA:  Dr. Grgeg, with general endotracheal anesthesia.    INDICATIONS:  Patient is a 79-year-old man who was admitted over the past weekend with a left femoral neck fracture.  I was scheduled to do his left hip replacement surgery on Sunday morning; however, he became unstable and was intubated.  He has been stabilized in the ICU over the last week and at this point, it was felt that he was stable enough to proceed to surgery.  The risks and complications of surgery were discussed with the patient and the family and no guarantees were given.  The consent was signed.  It should be noted that the patient has remained intubated this entire time.    Due to the complicated nature of the hip replacement in a fracture case versus elective, 2 surgical assistants were medically necessary for the proper retraction and safe transfer onto the [place] table and off the [place] table in particular.  They also provided help with deep and superficial closure, and prep and drape.  Without 2  surgical assistants in this particular case, the surgery would have been far more difficult and less safe, particularly in a patient who has been in the ICU with unstable medical conditions.  Therefore, they were both medically necessary.    OPERATIVE TECHNIQUE:  Patient brought to the operating room, placed in supine position.  Appropriate IV access and monitors were placed.  It should be noted the patient was brought directly from ICU intubated.  He was placed under general endotracheal anesthesia with his intubation tube in place by Dr. Gregg.  Ancef 2 g IV and tranexamic acid 1 g IV were both given as prophylaxis at the appropriate interval.  His feet were padded and the [place] table boots were placed.  The patient was then positioned properly on the [place] table.  He was provisionally prepped with Betadine on his left thigh and hip, and pictures of his pelvis as well as left and right hips were taken for templating during the case.    The surgical area was then prepped and draped in sterile fashion with ChloraPrep.  Incision was made over the abductor muscle.  Fascia was incised, and the muscle swept posteriorly.  The retractor was placed vertically.  The fascia to the lateral side of the rectus femoris was incised, and the muscle swept medially.  The retractor was now placed horizontally.  The fascia overlying the anterior circumflex vessels was incised and the vessels identified.  They were treated with Aquamantys, ligated medially and laterally, treated with Aquamantys, and incised.  They were then mobilized medially and laterally to expose the intracapsular fat and capsule.  Capsular flap was removed.  The muscle was swept medially.  The capsule was reflected laterally and tagged for traction during the case.  Displaced femoral neck fracture was confirmed.  Femoral neck was cut shorter, and the interval bone was removed.  The head was then removed using a corkscrew.  It measured to 49 mm.  The patient had  moderate osteoarthritis and therefore, I elected to proceed to total hip arthroplasty rather than hemiarthroplasty.  He was stable medically throughout the case.    The ligamentum and labrum were excised.  Labrum was treated with Aquamantys to prevent bleeding.  The reaming went from size 47 mm to 53 mm.  Cup impacted was a Biomet G7 acetabular shell size 54 mm.  Screw hole covers had been left in place.  A 54 mm dual mobility liner was then impacted and seen to be flushed and locked.  A small posterior medial osteophyte was removed.  The cup had been impacted with guidance of the fluoroscopy.  Proper anteversion and horizontalization were noted.    The medial calcar capsule was incised, and the femur externally rotated.  The redundant capsule in the greater trochanter fossa was excised.  The femur was mobile.  An assistant held a #7 retractor pushing the femur laterally.  Using the Mcgregor table, the leg was brought to the floor and externally rotated further.  Good visualization was noted.     chisel followed by awl was used to start the preparation.  The bone was noted to be soft.  Broaches went from size 1 to size 4.  Proper anteversion was noted.  Trial reduction was slightly long.  The trial components were removed.  I worked with a size 3 and size 4 broach and then used a calcar planer.  Trial reduction showed this fit much better.  This was stable to internal and external rotation.  There was a slight increase in the offset with a standard neck offset.  Leg however was matched to the preoperative findings and x-rays.     Because his bone was soft and he had significant medical conditions, I elected to cement the stem.  We used a \"[place] paradox\" technique.  A cement restrictor was placed down the canal.  The stem used was a Charlie Avenir Justin polished size 4 tapered cemented stem with standard neck offset.  Two batches of Charlie methylmethacrylate cement with gentamicin premixed in deference to his  renal failure was prepared in a vacuum container.  The canal was cleansed thoroughly with saline and dried thoroughly.  Cement was pressurized and the stem impacted.  Excess cement was removed and packed around the stem while cement hardened.  Proper anteversion was noted.  No fractures were noted.    When the cement had hardened, the Jones taper was cleaned and dried.  A 28 mm Biolox ceramic femoral head with a -3.5 mm femoral neck was impacted into a dual mobility Vivacit-E highly crosslinked polyethylene size 44 mm outer diameter by 28 mm inner diameter.  This was placed on the Jones taper and impacted.  The hip was reduced.  Pistoning did not clear.  Internal and external rotation was stable to 90 degrees.  Permanent pictures were taken off the fluoroscopy in both AP and lateral planes showing good placement of the hardware and again a slight increase in offset from the preoperative x-ray, but the length was unchanged.    Throughout the duration of the case, the wound had been irrigated with a dilute Betadine solution, followed by Pulsavac lavage saline.  This was done a final time and the saline evacuated.  The hip was injected with 80 mL of joint cocktail for postop pain relief.  The fascia followed by subcutaneous followed by skin were closed in layers with staples for the skin.  Prevena wound VAC dressing was applied, and the patient was taken off the [place] table to his operative bed.  His leg lengths and rotations were equal and vascular function was intact.  He was brought intubated back to the ICU.  During the closure, the patient was given tranexamic acid 1 g IV second dose in the operating room.    Blood loss was 500 mL.  The specimen was left femoral head to Pathology.  There were no drains that were placed in the operating room.  The patient already had a Lundy to gravity and was intubated to the operating room.  There were no complications, and patient tolerated the procedure well throughout the case.      Dictated By Humberto Murphy MD  d: 10/04/2024 12:42:34  t: 10/04/2024 22:10:52  Job [id number]  On license of [facility]/    cc: Wili Parmar MD

## 2024-10-06 LAB
BASE EXCESS BLD CALC-SCNC: 0.7 MMOL/L (ref ?–2)
DEPRECATED HBV CORE AB SER IA-ACNC: 2864 NG/ML
GLUCOSE BLDC GLUCOMTR-MCNC: 205 MG/DL (ref 70–99)
GLUCOSE BLDC GLUCOMTR-MCNC: 231 MG/DL (ref 70–99)
GLUCOSE BLDC GLUCOMTR-MCNC: 248 MG/DL (ref 70–99)
GLUCOSE BLDC GLUCOMTR-MCNC: 281 MG/DL (ref 70–99)
HCO3 BLDA-SCNC: 25.5 MEQ/L (ref 21–27)
IRON SATN MFR SERPL: 24 %
IRON SERPL-MCNC: 28 UG/DL
MODIFIED ALLEN TEST: POSITIVE
O2 CT BLD-SCNC: 16.5 VOL% (ref 15–23)
O2/TOTAL GAS SETTING VFR VENT: 35 %
OXYGEN LITERS/MINUTE: 8 L/MIN
PCO2 BLDA: 25 MM HG (ref 35–45)
PH BLDA: 7.55 [PH] (ref 7.35–7.45)
PO2 BLDA: 180 MM HG (ref 80–100)
PUNCTURE CHARGE: YES
SAO2 % BLDA: 97.7 % (ref 94–100)
TIBC SERPL-MCNC: 116 UG/DL (ref 250–425)
TRANSFERRIN SERPL-MCNC: 78 MG/DL (ref 215–365)

## 2024-10-06 PROCEDURE — 99233 SBSQ HOSP IP/OBS HIGH 50: CPT | Performed by: INTERNAL MEDICINE

## 2024-10-06 PROCEDURE — 99232 SBSQ HOSP IP/OBS MODERATE 35: CPT | Performed by: INTERNAL MEDICINE

## 2024-10-06 RX ORDER — ALBUMIN (HUMAN) 12.5 G/50ML
25 SOLUTION INTRAVENOUS
Status: ACTIVE | OUTPATIENT
Start: 2024-10-06 | End: 2024-10-08

## 2024-10-06 RX ORDER — METHYLPREDNISOLONE SODIUM SUCCINATE 125 MG/2ML
125 INJECTION, POWDER, LYOPHILIZED, FOR SOLUTION INTRAMUSCULAR; INTRAVENOUS ONCE
Status: COMPLETED | OUTPATIENT
Start: 2024-10-06 | End: 2024-10-06

## 2024-10-06 RX ORDER — METHYLPREDNISOLONE SODIUM SUCCINATE 125 MG/2ML
60 INJECTION, POWDER, LYOPHILIZED, FOR SOLUTION INTRAMUSCULAR; INTRAVENOUS EVERY 6 HOURS
Status: DISCONTINUED | OUTPATIENT
Start: 2024-10-06 | End: 2024-10-07

## 2024-10-06 RX ORDER — METHYLPREDNISOLONE SODIUM SUCCINATE 125 MG/2ML
INJECTION, POWDER, LYOPHILIZED, FOR SOLUTION INTRAMUSCULAR; INTRAVENOUS
Status: DISPENSED
Start: 2024-10-06 | End: 2024-10-07

## 2024-10-06 NOTE — PROGRESS NOTES
10/06/24 1125   VISIT TYPE   PT Inpatient Visit Type (Documentation Required) Attempted Evaluation  (RN requesting ROM while pt is still intubated but pt refused)

## 2024-10-06 NOTE — PROGRESS NOTES
[facility]  part of [facility]    Progress Note    Ollie Hernández Patient Status:  Inpatient    1945 MRN [id number]   Location [facility] 2W/SW Attending Radha Gabriel MD   Hosp Day # 8 PCP Wili Parmar MD       Subjective:   Ollie Hernández is a(n) 79 year old male who I am seeing for ESRD  No new issues  Remains intubated-failed weaning trial yesterday    Objective:   /51 (BP Location: Left arm)   Pulse 75   Temp 98.1 °F (36.7 °C) (Temporal)   Resp 14   Ht 5' 4.02\" (1.626 m)   Wt 144 lb 10 oz (65.6 kg)   SpO2 100%   BMI 24.81 kg/m²      Intake/Output Summary (Last 24 hours) at 10/6/2024 0639  Last data filed at 10/6/2024 0249  Gross per 24 hour   Intake 1246.98 ml   Output 0 ml   Net 1246.98 ml     Wt Readings from Last 1 Encounters:   10/05/24 144 lb 10 oz (65.6 kg)       Exam  Vital signs: Blood pressure 135/51, pulse 75, temperature 98.1 °F (36.7 °C), temperature source Temporal, resp. rate 14, height 5' 4.02\" (1.626 m), weight 144 lb 10 oz (65.6 kg), SpO2 100%.    General: No acute distress.   HEENT: Moist mucous membranes. EOMI. ETT  Neck:  No JVD.   Respiratory: Clear to auscultation bilaterally.    Cardiovascular: S1, S2.  Regular rate and rhythm.   Abdomen: Soft, nontender, nondistended.    Neurologic: No focal neurological deficits.  Musculoskeletal: Full range of motion of all extremities.  No swelling noted.  Access:AVF    Results:     Recent Labs   Lab 24  0427 24  1620 10/01/24  0330 10/02/24  0450 10/03/24  0421 10/04/24  0307 10/04/24  1411 10/05/24  0338   RBC 3.24*  --  3.40* 3.06* 3.44* 3.29*  --   --    HGB 10.0*   < > 10.5* 9.3* 10.3* 9.9* 9.1* 7.8*   HCT 30.2*   < > 31.8* 27.5* 31.0* 30.4* 28.0* 23.7*   MCV 93.2  --  93.5 89.9 90.1 92.4  --   --    MCH 30.9  --  30.9 30.4 29.9 30.1  --   --    MCHC 33.1  --  33.0 33.8 33.2 32.6  --   --    RDW 16.1*  --  16.6* 16.4* 16.2* 16.3*  --   --    NEPRELIM 9.33*  --  8.99*  --   --   6.51  --   --    WBC 11.2*  --  10.7 8.1 8.5 8.3  --   --    .0*  --  148.0* 159.0 176.0 182.0  --   --     < > = values in this interval not displayed.         Recent Labs   Lab 10/03/24  0421 10/04/24  0307 10/05/24  0338   * 261* 189*   BUN 48* 70* 39*   CREATSERUM 4.67* 6.05* 4.01*   CA 10.4 10.1 8.7    137 135*   K 4.0 3.4* 3.3*    99 99   CO2 27.0 25.0 26.0          XR CHEST AP PORTABLE  (CPT=71045)    Result Date: 10/5/2024  CONCLUSION:   Mild bibasilar opacity in suspected trace right pleural effusion not significantly changed.    Dictated by (CST): Kevin Hensley MD on 10/05/2024 at 10:18 AM     Finalized by (CST): Kevin Hensley MD on 10/05/2024 at 10:19 AM          XR HIP W OR WO PELVIS 1 VIEW, LEFT (CPT=73501)    Result Date: 10/4/2024  CONCLUSION: Postsurgical changes of left hip arthroplasty with no radiographic evidence of hardware complication.     elm-remote   Dictated by (CST): Florentino Mari MD on 10/04/2024 at 3:39 PM     Finalized by (CST): Florentino Mrai MD on 10/04/2024 at 3:41 PM          XR FLUOROSCOPY C-ARM TIME LESS THAN 1 HOUR (CPT=76000)    Result Date: 10/4/2024  CONCLUSION: Intraoperative fluoroscopy provided.  Please see surgical note for specific details.    Dictated by (CST): Kevin Hensley MD on 10/04/2024 at 1:59 PM     Finalized by (CST): Kevin Hensley MD on 10/04/2024 at 1:59 PM           Assessment and Plan:     79 year old male with past medical history of T2DM, HTN, HLD, ESRD on HD MWF, CAD s/p PCI (5/2024), A.fib on Eliquis, HFpEF, KRAIG, BPH, history of recurrent gastrointestinal bleeding presented to the ER after a fall sustaining a left hip fracture    Impression:    ESRD, HD Monday Wednesday Friday  Hypoxic respiratory failure, possible aspiration; now intubated  Left hip fracture, Ortho following-s/p Left anterior total hip arthroplasty with fluoroscopy. 10/4  Hypertension with ESRD  Diabetes with nephropathy  A-fib s/p  watchman  History of GI bleed  Anemia, drop in hemoglobin after surgery  Secondary hyperparathyroidism, monitor calcium and phosphorus      Plan:    HD Monday Wednesday schedule-will order HD for tomorrow  Monitor h/h.  Ferritin is elevated will avoid IV iron.  Added VANCE  Labs in a.m.      Thank you very much for allowing me to participate in the care of your patient.  If you have any questions, please do not hesitate to contact me.     Moni Pugh MD

## 2024-10-06 NOTE — PLAN OF CARE
Problem: Patient Centered Care  Goal: Patient preferences are identified and integrated in the patient's plan of care  Description: Interventions:  - What would you like us to know as we care for you?   - Provide timely, complete, and accurate information to patient/family  - Incorporate patient and family knowledge, values, beliefs, and cultural backgrounds into the planning and delivery of care  - Encourage patient/family to participate in care and decision-making at the level they choose  - Honor patient and family perspectives and choices  Outcome: Progressing     Problem: Diabetes/Glucose Control  Goal: Glucose maintained within prescribed range  Description: INTERVENTIONS:  - Monitor Blood Glucose as ordered  - Assess for signs and symptoms of hyperglycemia and hypoglycemia  - Administer ordered medications to maintain glucose within target range  - Assess barriers to adequate nutritional intake and initiate nutrition consult as needed  - Instruct patient on self management of diabetes  Outcome: Progressing     Problem: Patient/Family Goals  Goal: Patient/Family Long Term Goal  Description: Patient's Long Term Goal:     Interventions:  - See additional Care Plan goals for specific interventions  Outcome: Progressing  Goal: Patient/Family Short Term Goal  Description: Patient's Short Term Goal:     Interventions:   - See additional Care Plan goals for specific interventions  Outcome: Progressing     Problem: PAIN - ADULT  Goal: Verbalizes/displays adequate comfort level or patient's stated pain goal  Description: INTERVENTIONS:  - Encourage pt to monitor pain and request assistance  - Assess pain using appropriate pain scale  - Administer analgesics based on type and severity of pain and evaluate response  - Implement non-pharmacological measures as appropriate and evaluate response  - Consider cultural and social influences on pain and pain management  - Manage/alleviate anxiety  - Utilize distraction and/or  relaxation techniques  - Monitor for opioid side effects  - Notify MD/LIP if interventions unsuccessful or patient reports new pain  - Anticipate increased pain with activity and pre-medicate as appropriate  Outcome: Progressing     Problem: SAFETY ADULT - FALL  Goal: Free from fall injury  Description: INTERVENTIONS:  - Assess pt frequently for physical needs  - Identify cognitive and physical deficits and behaviors that affect risk of falls.  - Winston fall precautions as indicated by assessment.  - Educate pt/family on patient safety including physical limitations  - Instruct pt to call for assistance with activity based on assessment  - Modify environment to reduce risk of injury  - Provide assistive devices as appropriate  - Consider OT/PT consult to assist with strengthening/mobility  - Encourage toileting schedule  Outcome: Progressing     Problem: DISCHARGE PLANNING  Goal: Discharge to home or other facility with appropriate resources  Description: INTERVENTIONS:  - Identify barriers to discharge w/pt and caregiver  - Include patient/family/discharge partner in discharge planning  - Arrange for needed discharge resources and transportation as appropriate  - Identify discharge learning needs (meds, wound care, etc)  - Arrange for interpreters to assist at discharge as needed  - Consider post-discharge preferences of patient/family/discharge partner  - Complete POLST form as appropriate  - Assess patient's ability to be responsible for managing their own health  - Refer to Case Management Department for coordinating discharge planning if the patient needs post-hospital services based on physician/LIP order or complex needs related to functional status, cognitive ability or social support system  Outcome: Progressing     Problem: Safety Risk - Non-Violent Restraints  Goal: Patient will remain free from self-harm  Description: INTERVENTIONS:  - Apply the least restrictive restraint to prevent harm  - Notify  patient and family of reasons restraints applied  - Assess for any contributing factors to confusion (electrolyte disturbances, delirium, medications)  - Discontinue any unnecessary medical devices as soon as possible  - Assess the patient's physical comfort, circulation, skin condition, hydration, nutrition and elimination needs   - Reorient and redirection as needed  - Assess for the need to continue restraints  Outcome: Progressing     Problem: CARDIOVASCULAR - ADULT  Goal: Maintains optimal cardiac output and hemodynamic stability  Description: INTERVENTIONS:  - Monitor vital signs, rhythm, and trends  - Monitor for bleeding, hypotension and signs of decreased cardiac output  - Evaluate effectiveness of vasoactive medications to optimize hemodynamic stability  - Monitor arterial and/or venous puncture sites for bleeding and/or hematoma  - Assess quality of pulses, skin color and temperature  - Assess for signs of decreased coronary artery perfusion - ex. Angina  - Evaluate fluid balance, assess for edema, trend weights  Outcome: Progressing  Goal: Absence of cardiac arrhythmias or at baseline  Description: INTERVENTIONS:  - Continuous cardiac monitoring, monitor vital signs, obtain 12 lead EKG if indicated  - Evaluate effectiveness of antiarrhythmic and heart rate control medications as ordered  - Initiate emergency measures for life threatening arrhythmias  - Monitor electrolytes and administer replacement therapy as ordered  Outcome: Progressing     Problem: RESPIRATORY - ADULT  Goal: Achieves optimal ventilation and oxygenation  Description: INTERVENTIONS:  - Assess for changes in respiratory status  - Assess for changes in mentation and behavior  - Position to facilitate oxygenation and minimize respiratory effort  - Oxygen supplementation based on oxygen saturation or ABGs  - Provide Smoking Cessation handout, if applicable  - Encourage broncho-pulmonary hygiene including cough, deep breathe, Incentive  Spirometry  - Assess the need for suctioning and perform as needed  - Assess and instruct to report SOB or any respiratory difficulty  - Respiratory Therapy support as indicated  - Manage/alleviate anxiety  - Monitor for signs/symptoms of CO2 retention  Outcome: Progressing     Problem: GASTROINTESTINAL - ADULT  Goal: Maintains or returns to baseline bowel function  Description: INTERVENTIONS:  - Assess bowel function  - Maintain adequate hydration with IV or PO as ordered and tolerated  - Evaluate effectiveness of GI medications  - Encourage mobilization and activity  - Obtain nutritional consult as needed  - Establish a toileting routine/schedule  - Consider collaborating with pharmacy to review patient's medication profile  Outcome: Progressing

## 2024-10-06 NOTE — PROGRESS NOTES
[facility]  part of [facility]    Cardiology Progress Note    Ollie Hernández Patient Status:  Inpatient    1945 MRN [id number]   Location [facility] 2W/SW Attending Pranay Michel MD   Hosp Day # 8 PCP Wili Parmar MD       Impression/Plan:  79 year old male presenting with:    Impression:  Hip fracture Left - S/P L anterior total hip arthroplasty 10/4/24  Acute resp failure, aspiration and/or pulmonary edema; intubated 2024  -Failed weaning trial yesterday  ESRD on HD  DM  PAF, currently SR. S/p watchman device 24  HTN  HL, on statin PTA  Acute on chronic diastolic CHF  CAD s/p Ramin circ 24   Coffee grounds in OG tube  -Slight decline in Hb post-op  Elevated troponin, likely demand ischemia    Plan:  - Cont carvedilol for rate control  - Cont statin  - Asa/plavix on hold (recent Watchman implant 2024); Recheck Hb in Am - resume once safely able.    - HD as per nephrology  - Vent management as per pulm.    - Monitor on tele  - Reviewed w/ family at bedside      Subjective:   Intubated  Awake on vent.  Appears comfortable.          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)    Atherosclerosis of native arteries of extremities with intermittent claudication, bilateral legs (HCC)    Primary hypertension    Smokers' cough (HCC)    Unstable angina (HCC)    Lower GI bleed    ESRD (end stage renal disease) on dialysis (HCC)    PAF (paroxysmal atrial  fibrillation) (HCC)    AVM (arteriovenous malformation) of colon    ABLA (acute blood loss anemia)    Rectal bleeding    Anticoagulated    Antiplatelet or antithrombotic long-term use    Lower GI bleeding    ESRD on hemodialysis (HCC)    GI bleed    Closed displaced midcervical fracture of left femur with delayed healing    ESRD (end stage renal disease) (HCC)    Closed fracture of left hip (HCC)       Objective:   Temp: 97.2 °F (36.2 °C)  Pulse: 84  Resp: 14  BP: 123/44  AO: 157/58  FiO2 (%): 30 %    Intake/Output:     Intake/Output Summary (Last 24 hours) at 10/6/2024 1053  Last data filed at 10/6/2024 0722  Gross per 24 hour   Intake 1155.51 ml   Output 0 ml   Net 1155.51 ml       Last 3 Weights   10/05/24 0600 144 lb 10 oz (65.6 kg)   10/04/24 0600 145 lb 8.1 oz (66 kg)   10/02/24 1310 156 lb 4.9 oz (70.9 kg)   10/02/24 0800 143 lb 11.8 oz (65.2 kg)   10/01/24 0600 160 lb 0.9 oz (72.6 kg)   09/30/24 0400 164 lb 0.4 oz (74.4 kg)   09/28/24 1649 155 lb 11.2 oz (70.6 kg)   08/22/24 1311 159 lb 12.8 oz (72.5 kg)   08/20/24 0933 158 lb (71.7 kg)       Tele: NSR    Physical Exam:    General: Intubated, sedated  HEENT: Normocephalic, anicteric sclera ET/OG tube   Neck: supple  Cardiac: Regular rate and rhythm. S1, S2 normal. No murmur, pericardial rub, S3, thrill, heave or extra cardiac sounds.  Lungs: Coarse breath sounds bilat  Abdomen: Soft, non-distended  Extremities: Without clubbing, cyanosis or edema.  Peripheral pulses are 2+. SCD boots   Neurologic: Intubated  Skin: Warm and dry.     Laboratory/Data:    Labs:         Recent Labs   Lab 09/30/24  0427 09/30/24  1620 10/01/24  0330 10/02/24  0450 10/03/24  0421 10/04/24  0307 10/04/24  1411 10/05/24  0338   WBC 11.2*  --  10.7 8.1 8.5 8.3  --   --    HGB 10.0*   < > 10.5* 9.3* 10.3* 9.9* 9.1* 7.8*   MCV 93.2  --  93.5 89.9 90.1 92.4  --   --    .0*  --  148.0* 159.0 176.0 182.0  --   --     < > = values in this interval not displayed.       Recent Labs    Lab 09/30/24  0427 10/01/24  0330 10/02/24  0437 10/03/24  0421 10/04/24  0307 10/05/24  0338    139 137 140 137 135*   K 3.5 3.5 3.4* 4.0 3.4* 3.3*   CL 98 99 98 100 99 99   CO2 29.0 29.0 24.0 27.0 25.0 26.0   BUN 55* 36* 61* 48* 70* 39*   CREATSERUM 4.88* 3.98* 5.88* 4.67* 6.05* 4.01*   CA 9.9 9.8 9.9 10.4 10.1 8.7   MG 1.8  --  2.2  --   --   --    PHOS 5.6*  --   --   --   --   --    * 135* 140* 215* 261* 189*       Recent Labs   Lab 10/01/24  0330   ALT 9*   AST 22   ALB 3.9       No results for input(s): \"TROP\" in the last 168 hours.    Allergies:   Allergies   Allergen Reactions    Adhesive Tape OTHER (SEE COMMENTS)     Severe rashes    Dust Mite Extract RASH       Medications:  Current Facility-Administered Medications   Medication Dose Route Frequency    epoetin donna (Epogen, Procrit) 5,000 Units injection  5,000 Units Subcutaneous Once per day on Monday Wednesday Friday    fluconazole (Diflucan) tab 100 mg  100 mg Oral Nightly    metoclopramide (Reglan) 5 mg/mL injection 10 mg  10 mg Intravenous Q24H PRN    sodium chloride 0.9 % irrigation    Continuous PRN    sodium chloride 0.9 % irrigation    Continuous PRN    ondansetron (Zofran) 4 MG/2ML injection 4 mg  4 mg Intravenous Q6H PRN    diphenhydrAMINE (Benadryl) cap/tab 25 mg  25 mg Oral Q4H PRN    Or    diphenhydrAMINE (Benadryl) 50 mg/mL  injection 12.5 mg  12.5 mg Intravenous Q4H PRN    heparin (Porcine) 5000 UNIT/ML injection 5,000 Units  5,000 Units Subcutaneous Q8H STEPHANIE    dextrose 10% infusion (TPN no rate)   Intravenous Continuous PRN    pancrelipase (Lip-Prot-Amyl) (Zenpep) DR particles cap 10,000 Units  10,000 Units Per G Tube PRN    And    sodium bicarbonate tab 325 mg  325 mg Oral PRN    senna (Senokot) 8.8 MG/5ML oral syrup 17.6 mg  10 mL Oral BID    docusate (Colace) 50 MG/5ML oral solution 100 mg  100 mg Oral BID    mineral oil-white petrolatum (Artificial Tears) 83-15 % ophthalmic ointment 1 Application  1 Application Both  Eyes Nightly    norepinephrine (Levophed) 4 mg/250mL infusion premix  0.5-30 mcg/min Intravenous Continuous    lidocaine-menthol 4-1 % patch 2 patch  2 patch Transdermal Daily    fentaNYL (Sublimaze) 25 mcg BOLUS FROM BAG infusion  25 mcg Intravenous PRN    pantoprazole (Protonix) 40 mg in sodium chloride 0.9% PF 10 mL IV push  40 mg Intravenous Q12H    hydrALAzine (Apresoline) 20 mg/mL injection 10 mg  10 mg Intravenous Q6H PRN    labetalol (Trandate) 5 mg/mL injection 10 mg  10 mg Intravenous Q4H PRN    cetirizine (ZyrTEC) tab 5 mg  5 mg Per NG Tube Daily    carvedilol (Coreg) tab 25 mg  25 mg Per NG Tube BID    atorvastatin (Lipitor) tab 40 mg  40 mg Per NG Tube Nightly    acetaminophen (Tylenol) 160 MG/5ML oral liquid 500 mg  500 mg Per NG Tube Daily PRN    acetaminophen (Tylenol) tab 650 mg  650 mg Oral Q4H PRN    Or    acetaminophen (Tylenol) 160 MG/5ML oral liquid 650 mg  650 mg Per NG Tube Q4H PRN    Or    acetaminophen (Tylenol) rectal suppository 650 mg  650 mg Rectal Q4H PRN    polyethylene glycol (PEG 3350) (Miralax) 17 g oral packet 17 g  17 g Per NG Tube Daily PRN    insulin aspart (NovoLOG) 100 Units/mL FlexPen 1-7 Units  1-7 Units Subcutaneous q6h    heparin (Porcine) 1000 UNIT/ML injection 1,500 Units  1.5 mL Intravenous PRN Dialysis    lidocaine-prilocaine (Emla) 2.5-2.5 % cream   Topical PRN    bisacodyl (Dulcolax) 10 MG rectal suppository 10 mg  10 mg Rectal Daily PRN    chlorhexidine gluconate (Peridex) 0.12 % oral solution 15 mL  15 mL Mouth/Throat BID@0800,2000    propofol (Diprivan) 10 mg/mL infusion premix  5-50 mcg/kg/min (Dosing Weight) Intravenous Continuous    fentaNYL in sodium chloride 0.9% (Sublimaze) 1000 mcg/100mL infusion premix   mcg/hr Intravenous Continuous PRN    albuterol (Ventolin HFA) 108 (90 Base) MCG/ACT inhaler 2 puff  2 puff Inhalation Q4H PRN    fluticasone propionate (Flonase) 50 MCG/ACT nasal suspension 2 spray  2 spray Nasal Daily PRN    glucose (Dex4) 15  GM/59ML oral liquid 15 g  15 g Oral Q15 Min PRN    Or    glucose (Glutose) 40% oral gel 15 g  15 g Oral Q15 Min PRN    Or    glucose-vitamin C (Dex-4) chewable tab 4 tablet  4 tablet Oral Q15 Min PRN    Or    dextrose 50% injection 50 mL  50 mL Intravenous Q15 Min PRN    Or    glucose (Dex4) 15 GM/59ML oral liquid 30 g  30 g Oral Q15 Min PRN    Or    glucose (Glutose) 40% oral gel 30 g  30 g Oral Q15 Min PRN    Or    glucose-vitamin C (Dex-4) chewable tab 8 tablet  8 tablet Oral Q15 Min PRN    ipratropium-albuterol (Duoneb) 0.5-2.5 (3) MG/3ML inhalation solution 3 mL  3 mL Nebulization Q6H PRN    fluticasone-salmeterol (Advair Diskus) 250-50 MCG/ACT inhaler 1 puff  1 puff Inhalation BID

## 2024-10-06 NOTE — PROGRESS NOTES
[facility] Pulmonary, Critical Care and Sleep    Ollie Hernández Patient Status:  Inpatient    1945 MRN [id number]   Location [facility] 2W/SW Attending Lizbeth Rey MD   Hosp Day # 8 PCP Wili Parmar MD     Date of Admission: 2024 12:00 PM    Admission Diagnosis: Closed fracture of left hip, initial encounter ([facility]) [S72.002A]    S: some apneic episodes during T piece.     Scheduled Medications:     epoetin donna  5,000 Units Subcutaneous Once per day on     fluconazole  100 mg Oral Nightly    heparin  5,000 Units Subcutaneous Q8H STEPHANIE    senna  10 mL Oral BID    docusate  100 mg Oral BID    mineral oil-white petrolatum  1 Application Both Eyes Nightly    lidocaine-menthol  2 patch Transdermal Daily    pantoprazole  40 mg Intravenous Q12H    cetirizine  5 mg Per NG Tube Daily    carvedilol  25 mg Per NG Tube BID    atorvastatin  40 mg Per NG Tube Nightly    insulin aspart  1-7 Units Subcutaneous q6h    chlorhexidine gluconate  15 mL Mouth/Throat BID@0800,2000    fluticasone-salmeterol  1 puff Inhalation BID       Infusing Medications:     sodium chloride      sodium chloride      dextrose 10% Stopped (10/05/24 191)    norepinephrine Stopped (10/02/24 1546)    propofol Stopped (10/06/24 0722)    fentanyl 50 mcg/hr (10/06/24 024)       PRN Medications:    metoclopramide    sodium chloride    sodium chloride    ondansetron    diphenhydrAMINE **OR** diphenhydrAMINE    dextrose 10%    lipase-protease-amylase (Lip-Prot-Amyl) **AND** sodium bicarbonate    fentaNYL (Sublimaze)    hydrALAzine    labetalol    acetaminophen    acetaminophen **OR** acetaminophen **OR** acetaminophen **OR** [DISCONTINUED] acetaminophen    polyethylene glycol (PEG 3350)    heparin    lidocaine-prilocaine    bisacodyl    fentanyl    albuterol    fluticasone propionate    glucose **OR** glucose **OR** glucose-vitamin C **OR** dextrose **OR** glucose **OR** glucose **OR** glucose-vitamin C     ipratropium-albuterol    OBJECTIVE:  /44 (BP Location: Left arm)   Pulse 84   Temp 97.2 °F (36.2 °C) (Temporal)   Resp 14   Ht 162.6 cm (5' 4.02\")   Wt 144 lb 10 oz (65.6 kg)   SpO2 100%   BMI 24.81 kg/m²    Temp (24hrs), Av.3 °F (36.8 °C), Min:97.2 °F (36.2 °C), Max:99.7 °F (37.6 °C)      Vent Mode: VC/AC  FiO2 (%):  [30 %-40 %] 30 %  S RR:  [14] 14  S VT:  [500 mL] 500 mL  PEEP/CPAP (cm H2O):  [5 cm H20] 5 cm H20  MAP (cm H2O):  [9] 9      Wt Readings from Last 3 Encounters:   10/05/24 144 lb 10 oz (65.6 kg)   24 159 lb 12.8 oz (72.5 kg)   24 158 lb (71.7 kg)       I/O last 3 completed shifts:  In: 2116.6 [I.V.:1769.6; NG/GT:347]  Out: 0   I/O this shift:  In: 123.9 [I.V.:123.9]  Out: -      General: intubated and NAD.  Neuro: Sedated. Follows commands and moves all extremities.   HEENT: PERRL  Neck : No LAD  CV: RRR, nl S1, S2, no S4, S3 or murmur.   Lungs: Clear   Abd: Nontender, non distended.    Ext: No edema.   Skin: No rashes.     Recent Labs   Lab 10/02/24  0450 10/03/24  0421 10/04/24  0307 10/04/24  1411 10/05/24  0338   WBC 8.1 8.5 8.3  --   --    HGB 9.3* 10.3* 9.9* 9.1* 7.8*   HCT 27.5* 31.0* 30.4* 28.0* 23.7*   .0 176.0 182.0  --   --      Recent Labs   Lab 10/01/24  0330 10/02/24  0437 10/03/24  0421 10/04/24  0307 10/05/24  0338   *   < > 215* 261* 189*   BUN 36*   < > 48* 70* 39*   CREATSERUM 3.98*   < > 4.67* 6.05* 4.01*   CA 9.8   < > 10.4 10.1 8.7      < > 140 137 135*   K 3.5   < > 4.0 3.4* 3.3*   CL 99   < > 100 99 99   CO2 29.0   < > 27.0 25.0 26.0   AST 22  --   --   --   --    ALT 9*  --   --   --   --    ALB 3.9  --   --   --   --     < > = values in this interval not displayed.     No results for input(s): \"INR\", \"PTT\" in the last 168 hours.  Recent Labs   Lab 10/05/24  1233   ABGPHT 7.52*   FILVHC8O 32*   LGITF1I 169*   ABGHCO3 27.4*   SITE Arterial Line       COVID-19 Lab Results    COVID-19  Lab Results   Component Value Date    COVID19  Not Detected 02/16/2023    COVID19 Not Detected 01/15/2022    COVID19 Not Detected 12/18/2021       Pro-Calcitonin  No results for input(s): \"PCT\" in the last 168 hours.    Cardiac  No results for input(s): \"TROP\", \"PBNP\" in the last 168 hours.    Creatinine Kinase  No results for input(s): \"CK\" in the last 168 hours.    Inflammatory Markers  Recent Labs   Lab 10/06/24  0710   KASIA 2,864*       Imaging:   CXR 10/3:    CXR 10/5/2024 pending  Chest images personally reviewed.   Assessment/Plan   Acute respiratory failure - due to pulmonary edema and/or aspiration. Intubated 9/29. CXR with interval improvement  - ?apneas with weaning.   - ? From hyperventilation. Doing better and would recommend trial fo extubation.   - minimize sedation as able  - s/p Zosyn x 5 days.   - volume management with HD  ESRD   - HD per nephrology  KRAIG  - on CPAP at home  Asthma  - Advair in lieu of Symbicort once extubated  Coffee ground output from OGT - resolved  - IV Protonix BID  CV: h/o pAFib  S/p watchman 9/2024.   Per cardiology.   Anemia - due to above  - transfuse to keep hgb >7  Hip fracture after fall  - s/p L FRANCE 10/4  Per ortho.   DM  - SSI  FEN  - TFs on hold  Ppx  - subcu heparin on hold  - PPI  Dispo  - ICU  Critical Care Time greater than: 35 minutes    My best regards,         Ollie Santos MD  [facility] Pulmonary, Critical Care and Sleep Medicine

## 2024-10-06 NOTE — PROGRESS NOTES
Orthopaedic Surgery Inpatient Consult Progress Note  _______________________________________________________________________________________________________________  _______________________________________________________________________________________________________________    Ollie Hernández Patient Status:  Inpatient    1945 MRN [id number]   Location [facility] 2W/SW Attending Lizbeth Rey MD     DATE: 10/6/2024     HISTORY OF PRESENT ILLNESS: Ollie Hernández is a 79 year old male who presented as a consult to the Orthopaedic Surgery service for L femoral neck fx. He is now s/p L WENDY on 10/4.     S/24H EVENTS: Pain better controlled overnight. Denies any overnight issues.     PHYSICAL EXAM  /51 (BP Location: Left arm)   Pulse 75   Temp 98.1 °F (36.7 °C) (Temporal)   Resp 14   Ht 5' 4.02\" (1.626 m)   Wt 144 lb 10 oz (65.6 kg)   SpO2 100%   BMI 24.81 kg/m²      Constitutional: The patient is well-developed, well-nourished, in no acute distress.  Neurological: Unable to assess  Cardiovascular: regular rate by palpation  Pulmonary/Chest: Airway supported with ET tube  Left  Leg  INSPECTION/PALPATION  Dressings c/d/i  Mild  swelling about thigh  ROM  deferred   MOTOR  Intact to TA/GSC/peroneals  SILT Sharad/Saph/SPN/DPN/T  1+ DP/PT pulse, brisk capillary refill     LAB RESULTS  Lab Results   Component Value Date    WBC 8.3 10/04/2024    HGB 7.8 (L) 10/05/2024    HCT 23.7 (L) 10/05/2024    .0 10/04/2024    CREATSERUM 4.01 (H) 10/05/2024    BUN 39 (H) 10/05/2024     (L) 10/05/2024    K 3.3 (L) 10/05/2024    CL 99 10/05/2024    CO2 26.0 10/05/2024     (H) 10/05/2024    CA 8.7 10/05/2024    ALB 3.9 10/01/2024    ALKPHO 63 10/01/2024    BILT 1.2 (H) 10/01/2024    TP 7.0 10/01/2024    AST 22 10/01/2024    ALT 9 (L) 10/01/2024    PTT 30.1 2024    INR 1.45 (H) 2024    T4F 0.9 2022    TSH 2.844 2024     (H) 2024    PSA 2.94 10/20/2021     DDIMER 6.07 (H) 09/29/2024    ESRML 10 06/16/2024    CRP 1.30 (H) 06/16/2024    MG 2.2 10/02/2024    PHOS 5.6 (H) 09/30/2024    TROP <0.045 07/25/2019     08/05/2023    B12 672 07/04/2024       IMAGING  I independently viewed and interpreted the imaging. Radiologist interpretation is available in the imaging report.  X-ray: Plain films of the left hip demonstrate postop changes consistent with L WENDY. No fx, dislocations, or complications apparent    XR CHEST AP PORTABLE  (CPT=71045)    Result Date: 10/5/2024  CONCLUSION:   Mild bibasilar opacity in suspected trace right pleural effusion not significantly changed.    Dictated by (CST): Kevin Hensley MD on 10/05/2024 at 10:18 AM     Finalized by (CST): Kevin Hensley MD on 10/05/2024 at 10:19 AM          XR HIP W OR WO PELVIS 1 VIEW, LEFT (CPT=73501)    Result Date: 10/4/2024  CONCLUSION: Postsurgical changes of left hip arthroplasty with no radiographic evidence of hardware complication.     elm-remote   Dictated by (CST): Florentino Mari MD on 10/04/2024 at 3:39 PM     Finalized by (CST): Florentino Mari MD on 10/04/2024 at 3:41 PM          XR FLUOROSCOPY C-ARM TIME LESS THAN 1 HOUR (CPT=76000)    Result Date: 10/4/2024  CONCLUSION: Intraoperative fluoroscopy provided.  Please see surgical note for specific details.    Dictated by (CST): Kevin Hensley MD on 10/04/2024 at 1:59 PM     Finalized by (CST): Kevin Hensley MD on 10/04/2024 at 1:59 PM               IMPRESSIONS/RECOMMENDATIONS: Ollie Hernández is a 79 year old male who presents as a consult to the Orthopaedic surgery service for L femoral neck fx now POD2 s/p L WENDY. He is overall recovering fairly well    Discussed the history, physical exam, treatment to date, and reviewed relevant imaging an studies with the patient.  SURGICAL PLANS: None at this time  ABX: None needed for orthopedic care  PAIN: Continue to wean/titrate to appropriate regimen  WEIGHT BEARING STATUS: Weightbearing as  tolerated  RANGE-OF-MOTION LIMITATIONS: as tolerated  IMAGING: None    I have personally seen Ollie Hernández and discussed in detail their plan of care. Thank you for allowing me to participate in the care of your patient.   Please note that this note was written in combination with voice recognition/dictation software and there is a possibility of transcription errors which were not identified at the time of note submission. If clarification is necessary, please contact the author or clinic staff.    Anil Pang MD  Orthopaedic Surgery  10/6/2024

## 2024-10-06 NOTE — PROGRESS NOTES
[facility]  part of [facility]    Progress Note    Ollie Hernández Patient Status:  Inpatient    1945 MRN [id number]   Location [facility] 2W/SW Attending Pranay Michel MD   Hosp Day # 8 PCP Wili Parmar MD     Chief Complaint:   Chief Complaint   Patient presents with    Fall       Subjective:   Ollie Hernández   Status post left hip arthroplasty on 10/4/2024.    Patient was unable to tolerate weaning trial yesterday due to pain when fentanyl weaned off.  Still on fentanyl drip now.  Will attempt breathing trial again.  Tolerating tube feeds well.  Otherwise no acute issues    No events overnght  Objective:   Objective:    Blood pressure 123/44, pulse 84, temperature 97.2 °F (36.2 °C), temperature source Temporal, resp. rate 14, height 5' 4.02\" (1.626 m), weight 144 lb 10 oz (65.6 kg), SpO2 100%.    Physical Exam:    General: Intubated, alert   HEENT: Normocephalic, anicteric sclera ET/OG tube   Neck: supple  Cardiac: Regular rate and rhythm. S1, S2 normal. No murmurs thrills or rubs  Lungs: Coarse breath sounds bilat  Abdomen: Soft, non-distended  Extremities: Without clubbing, cyanosis or edema.   Skin: Warm and dry.       Results:   Results:    Labs:  Recent Labs   Lab 24  0427 24  1620 10/01/24  0330 10/02/24  0450 10/03/24  0421 10/04/24  0307 10/04/24  1411 10/05/24  0338   WBC 11.2*  --  10.7 8.1 8.5 8.3  --   --    HGB 10.0*   < > 10.5* 9.3* 10.3* 9.9* 9.1* 7.8*   MCV 93.2  --  93.5 89.9 90.1 92.4  --   --    .0*  --  148.0* 159.0 176.0 182.0  --   --     < > = values in this interval not displayed.       Recent Labs   Lab 10/01/24  0330 10/02/24  0437 10/03/24  0421 10/04/24  0307 10/05/24  0338   *   < > 215* 261* 189*   BUN 36*   < > 48* 70* 39*   CREATSERUM 3.98*   < > 4.67* 6.05* 4.01*   CA 9.8   < > 10.4 10.1 8.7   ALB 3.9  --   --   --   --       < > 140 137 135*   K 3.5   < > 4.0 3.4* 3.3*   CL 99   < > 100 99 99   CO2 29.0    < > 27.0 25.0 26.0   ALKPHO 63  --   --   --   --    AST 22  --   --   --   --    ALT 9*  --   --   --   --    BILT 1.2*  --   --   --   --    TP 7.0  --   --   --   --     < > = values in this interval not displayed.       Estimated Creatinine Clearance: 12.9 mL/min (A) (based on SCr of 4.01 mg/dL (H)).    No results for input(s): \"PTP\", \"INR\" in the last 168 hours.         Culture:  Hospital Encounter on 09/28/24   1. Blood Culture     Status: None    Collection Time: 09/30/24 10:03 AM    Specimen: Bld,Arterial Line; Blood   Result Value Ref Range    Blood Culture Result No Growth 5 Days N/A       Cardiac  No results for input(s): \"TROP\", \"PBNP\" in the last 168 hours.      Imaging: Imaging data reviewed in [facility].  XR CHEST AP PORTABLE  (CPT=71045)    Result Date: 10/5/2024  CONCLUSION:   Mild bibasilar opacity in suspected trace right pleural effusion not significantly changed.    Dictated by (CST): Kevin Hensley MD on 10/05/2024 at 10:18 AM     Finalized by (CST): Kevin Hensley MD on 10/05/2024 at 10:19 AM          XR HIP W OR WO PELVIS 1 VIEW, LEFT (CPT=73501)    Result Date: 10/4/2024  CONCLUSION: Postsurgical changes of left hip arthroplasty with no radiographic evidence of hardware complication.     elm-remote   Dictated by (CST): Florentino Mari MD on 10/04/2024 at 3:39 PM     Finalized by (CST): Florentino Mari MD on 10/04/2024 at 3:41 PM          XR FLUOROSCOPY C-ARM TIME LESS THAN 1 HOUR (CPT=76000)    Result Date: 10/4/2024  CONCLUSION: Intraoperative fluoroscopy provided.  Please see surgical note for specific details.    Dictated by (CST): Kevin Hensley MD on 10/04/2024 at 1:59 PM     Finalized by (CST): Kevin Hensley MD on 10/04/2024 at 1:59 PM           Medications:    epoetin donna  5,000 Units Subcutaneous Once per day on Monday Wednesday Friday    fluconazole  100 mg Oral Nightly    heparin  5,000 Units Subcutaneous Q8H STEPHANIE    senna  10 mL Oral BID    docusate  100 mg Oral BID     mineral oil-white petrolatum  1 Application Both Eyes Nightly    lidocaine-menthol  2 patch Transdermal Daily    pantoprazole  40 mg Intravenous Q12H    cetirizine  5 mg Per NG Tube Daily    carvedilol  25 mg Per NG Tube BID    atorvastatin  40 mg Per NG Tube Nightly    insulin aspart  1-7 Units Subcutaneous q6h    chlorhexidine gluconate  15 mL Mouth/Throat BID@0800,2000    fluticasone-salmeterol  1 puff Inhalation BID         Assessment and Plan:   Assessment & Plan:      Acute hypoxic respiratory failure   Hypotension   Aspiration PNA  Pulmonary and cardiology on consult.   Secondary to pulmonary edema and aspiration  Intubated 9/29 - cont full vent support.   Empiric zosyn.   Sputum cx normal julio c. Blood cx x 4 NGTD  Daily SBT.   Lactic acid normal. WBC trending down   Volume management per nephrology   CXR pulmonary edema. Improved alveolar opacities.   Wean pressors as able to keep MAP > 65 or sBP > 90   10/6: Continue to attempt weaning trial today.  Questionable thrush on exam?  Started p.o. fluconazole through OG 10/4  -If able to extubate today will have speech evaluate for possible advancement of diet.  ESRD   HD per nephrology MWF  Nephro on consult.     Hip fracture   S/p fall PTA -status post left hip arthroplasty on 10/4/2024  Orthopedic surgery on consult.   High risk for surgery  however surgery is necessary.   DM II   A1c   ISS     Paroxysmal A.fib - currently NSR   Acute on chronic HFpEF  CAD s/p jodi circ 5/24'   Elevate troponin secondary to demand ischemia   S/p watchman device 9/11/24  Cont coreg, statin  No further cardiac testing prior to planned surgery.   Resume ASA/Plavix once safely able to do so.     Coffee ground emesis   Resolved.   IV protonix BID   Hemoglobin downtrending postoperatively from 9.1-7.8.  Continue to monitor closely       >55min spent, >50% spent counseling and coordinating care in the form of educating pt/family and d/w consultants and staff. Most of the time spent  discussing the above plan.        Plan of care discussed with patient or family at bedside.    Lizbeth Rey MD            Supplementary Documentation:     Quality:  DVT Prophylaxis: heparin   CODE status: Full  Dispo: per clinical course            Estimated date of discharge: TBD  Discharge is dependent on: clinical stability  At this point Mr. Hernández is expected to be discharge to: TBD

## 2024-10-07 ENCOUNTER — APPOINTMENT (OUTPATIENT)
Dept: GENERAL RADIOLOGY | Facility: HOSPITAL | Age: 79
End: 2024-10-07
Attending: INTERNAL MEDICINE
Payer: MEDICARE

## 2024-10-07 PROBLEM — J96.90 RESPIRATORY FAILURE (HCC): Status: ACTIVE | Noted: 2024-10-07

## 2024-10-07 PROBLEM — D64.9 ANEMIA: Status: ACTIVE | Noted: 2024-10-07

## 2024-10-07 PROBLEM — E83.39 HYPERPHOSPHATEMIA: Status: ACTIVE | Noted: 2024-01-01

## 2024-10-07 PROBLEM — D64.9 ANEMIA: Status: ACTIVE | Noted: 2024-01-01

## 2024-10-07 PROBLEM — E83.39 HYPERPHOSPHATEMIA: Status: ACTIVE | Noted: 2024-10-07

## 2024-10-07 PROBLEM — J96.90 RESPIRATORY FAILURE (HCC): Status: ACTIVE | Noted: 2024-01-01

## 2024-10-07 LAB
ALBUMIN SERPL-MCNC: 3.6 G/DL (ref 3.2–4.8)
ANION GAP SERPL CALC-SCNC: 12 MMOL/L (ref 0–18)
BASOPHILS # BLD AUTO: 0.04 X10(3) UL (ref 0–0.2)
BASOPHILS NFR BLD AUTO: 0.3 %
BUN BLD-MCNC: 65 MG/DL (ref 9–23)
BUN/CREAT SERPL: 9.5 (ref 10–20)
CALCIUM BLD-MCNC: 10 MG/DL (ref 8.7–10.4)
CHLORIDE SERPL-SCNC: 92 MMOL/L (ref 98–112)
CO2 SERPL-SCNC: 22 MMOL/L (ref 21–32)
CREAT BLD-MCNC: 6.84 MG/DL
DEPRECATED RDW RBC AUTO: 52.6 FL (ref 35.1–46.3)
EGFRCR SERPLBLD CKD-EPI 2021: 8 ML/MIN/1.73M2 (ref 60–?)
EOSINOPHIL # BLD AUTO: 0.01 X10(3) UL (ref 0–0.7)
EOSINOPHIL NFR BLD AUTO: 0.1 %
ERYTHROCYTE [DISTWIDTH] IN BLOOD BY AUTOMATED COUNT: 15.6 % (ref 11–15)
GLUCOSE BLD-MCNC: 311 MG/DL (ref 70–99)
GLUCOSE BLDC GLUCOMTR-MCNC: 180 MG/DL (ref 70–99)
GLUCOSE BLDC GLUCOMTR-MCNC: 195 MG/DL (ref 70–99)
GLUCOSE BLDC GLUCOMTR-MCNC: 203 MG/DL (ref 70–99)
GLUCOSE BLDC GLUCOMTR-MCNC: 264 MG/DL (ref 70–99)
GLUCOSE BLDC GLUCOMTR-MCNC: 314 MG/DL (ref 70–99)
HCT VFR BLD AUTO: 24.7 %
HGB BLD-MCNC: 7.9 G/DL
IMM GRANULOCYTES # BLD AUTO: 0.26 X10(3) UL (ref 0–1)
IMM GRANULOCYTES NFR BLD: 2.1 %
LYMPHOCYTES # BLD AUTO: 0.44 X10(3) UL (ref 1–4)
LYMPHOCYTES NFR BLD AUTO: 3.5 %
MCH RBC QN AUTO: 29.5 PG (ref 26–34)
MCHC RBC AUTO-ENTMCNC: 32 G/DL (ref 31–37)
MCV RBC AUTO: 92.2 FL
MONOCYTES # BLD AUTO: 0.11 X10(3) UL (ref 0.1–1)
MONOCYTES NFR BLD AUTO: 0.9 %
NEUTROPHILS # BLD AUTO: 11.71 X10 (3) UL (ref 1.5–7.7)
NEUTROPHILS # BLD AUTO: 11.71 X10(3) UL (ref 1.5–7.7)
NEUTROPHILS NFR BLD AUTO: 93.1 %
OSMOLALITY SERPL CALC.SUM OF ELEC: 292 MOSM/KG (ref 275–295)
PHOSPHATE SERPL-MCNC: 9.6 MG/DL (ref 2.4–5.1)
PLATELET # BLD AUTO: 222 10(3)UL (ref 150–450)
POTASSIUM SERPL-SCNC: 4.6 MMOL/L (ref 3.5–5.1)
RBC # BLD AUTO: 2.68 X10(6)UL
SODIUM SERPL-SCNC: 126 MMOL/L (ref 136–145)
WBC # BLD AUTO: 12.6 X10(3) UL (ref 4–11)

## 2024-10-07 PROCEDURE — 99233 SBSQ HOSP IP/OBS HIGH 50: CPT | Performed by: INTERNAL MEDICINE

## 2024-10-07 PROCEDURE — 71045 X-RAY EXAM CHEST 1 VIEW: CPT | Performed by: INTERNAL MEDICINE

## 2024-10-07 RX ORDER — METHYLPREDNISOLONE SODIUM SUCCINATE 40 MG/ML
40 INJECTION, POWDER, LYOPHILIZED, FOR SOLUTION INTRAMUSCULAR; INTRAVENOUS EVERY 8 HOURS
Status: DISCONTINUED | OUTPATIENT
Start: 2024-10-07 | End: 2024-10-08

## 2024-10-07 RX ORDER — HYDROMORPHONE HYDROCHLORIDE 1 MG/ML
0.4 INJECTION, SOLUTION INTRAMUSCULAR; INTRAVENOUS; SUBCUTANEOUS EVERY 2 HOUR PRN
Status: DISCONTINUED | OUTPATIENT
Start: 2024-10-07 | End: 2024-10-22

## 2024-10-07 RX ORDER — HYDROMORPHONE HYDROCHLORIDE 1 MG/ML
0.1 INJECTION, SOLUTION INTRAMUSCULAR; INTRAVENOUS; SUBCUTANEOUS EVERY 2 HOUR PRN
Status: DISCONTINUED | OUTPATIENT
Start: 2024-10-07 | End: 2024-10-22

## 2024-10-07 RX ORDER — HYDROMORPHONE HYDROCHLORIDE 1 MG/ML
0.2 INJECTION, SOLUTION INTRAMUSCULAR; INTRAVENOUS; SUBCUTANEOUS EVERY 2 HOUR PRN
Status: DISCONTINUED | OUTPATIENT
Start: 2024-10-07 | End: 2024-10-22

## 2024-10-07 NOTE — PROGRESS NOTES
[facility]  part of [facility]    Progress Note      Subjective:     Patient currently receiving dialysis-tolerating well.  Currently on 2 L nasal cannula.  S/p extubation yesterday.  Denies any chest pain or shortness of breath.  Remains on fentanyl gtt.      Review of Systems:   Constitutional: Improving energy level  Eyes: negative for irritation, redness and visual disturbance  Ears, nose, mouth, throat, and face: negative for hearing loss and sore throat  Respiratory: negative for cough, hemoptysis and wheezing  Cardiovascular: negative for chest pain, exertional dyspnea  Gastrointestinal: negative for abdominal pain, diarrhea and nausea  Hematologic/lymphatic: negative for bleeding and easy bruising  Musculoskeletal:negative for back pain, bone pain and muscle weakness  Neurological: negative for gait problems, memory problems and seizures  Behavioral/Psych: negative for anxiety and depression    Objective:   Temp:  [97.1 °F (36.2 °C)-98 °F (36.7 °C)] 97.5 °F (36.4 °C)  Pulse:  [78-95] 80  Resp:  [10-25] 17  BP: ()/(44-65) 136/54  SpO2:  [93 %-100 %] 98 %  SpO2: 98 %     Intake/Output Summary (Last 24 hours) at 10/7/2024 1325  Last data filed at 10/7/2024 1300  Gross per 24 hour   Intake 71.65 ml   Output 50 ml   Net 21.65 ml     Wt Readings from Last 3 Encounters:   10/05/24 144 lb 10 oz (65.6 kg)   08/22/24 159 lb 12.8 oz (72.5 kg)   08/20/24 158 lb (71.7 kg)       General appearance: Alert and awake.  Oriented.  Currently on 2 L nasal cannula  Head: Normocephalic, atraumatic  Eyes: conjunctivae/corneas clear  Throat: lips, mucosa, and tongue normal; teeth and gums normal  Neck:  no JVD, supple  Extremities: extremities normal, no edema  Skin: No rashes or lesions  Neurologic: Grossly normal  Psychiatric: calm    Medications:  Current Facility-Administered Medications   Medication Dose Route Frequency    HYDROmorphone (Dilaudid) 1 MG/ML injection 0.1 mg  0.1 mg Intravenous Q2H  PRN    Or    HYDROmorphone (Dilaudid) 1 MG/ML injection 0.2 mg  0.2 mg Intravenous Q2H PRN    Or    HYDROmorphone (Dilaudid) 1 MG/ML injection 0.4 mg  0.4 mg Intravenous Q2H PRN    methylPREDNISolone sodium succinate (Solu-MEDROL) injection 40 mg  40 mg Intravenous Q8H    sodium chloride 0.9 % IV bolus 100 mL  100 mL Intravenous Q30 Min PRN    And    albumin human (Albumin) 25% injection 25 g  25 g Intravenous PRN Dialysis    epoetin donna (Epogen, Procrit) 5,000 Units injection  5,000 Units Subcutaneous Once per day on Monday Wednesday Friday    fluconazole (Diflucan) tab 100 mg  100 mg Oral Nightly    metoclopramide (Reglan) 5 mg/mL injection 10 mg  10 mg Intravenous Q24H PRN    sodium chloride 0.9 % irrigation    Continuous PRN    sodium chloride 0.9 % irrigation    Continuous PRN    ondansetron (Zofran) 4 MG/2ML injection 4 mg  4 mg Intravenous Q6H PRN    diphenhydrAMINE (Benadryl) cap/tab 25 mg  25 mg Oral Q4H PRN    Or    diphenhydrAMINE (Benadryl) 50 mg/mL  injection 12.5 mg  12.5 mg Intravenous Q4H PRN    heparin (Porcine) 5000 UNIT/ML injection 5,000 Units  5,000 Units Subcutaneous Q8H STEPHANIE    dextrose 10% infusion (TPN no rate)   Intravenous Continuous PRN    pancrelipase (Lip-Prot-Amyl) (Zenpep) DR particles cap 10,000 Units  10,000 Units Per G Tube PRN    And    sodium bicarbonate tab 325 mg  325 mg Oral PRN    senna (Senokot) 8.8 MG/5ML oral syrup 17.6 mg  10 mL Oral BID    docusate (Colace) 50 MG/5ML oral solution 100 mg  100 mg Oral BID    mineral oil-white petrolatum (Artificial Tears) 83-15 % ophthalmic ointment 1 Application  1 Application Both Eyes Nightly    lidocaine-menthol 4-1 % patch 2 patch  2 patch Transdermal Daily    pantoprazole (Protonix) 40 mg in sodium chloride 0.9% PF 10 mL IV push  40 mg Intravenous Q12H    hydrALAzine (Apresoline) 20 mg/mL injection 10 mg  10 mg Intravenous Q6H PRN    labetalol (Trandate) 5 mg/mL injection 10 mg  10 mg Intravenous Q4H PRN    cetirizine (ZyrTEC) tab 5  mg  5 mg Per NG Tube Daily    carvedilol (Coreg) tab 25 mg  25 mg Per NG Tube BID    atorvastatin (Lipitor) tab 40 mg  40 mg Per NG Tube Nightly    acetaminophen (Tylenol) 160 MG/5ML oral liquid 500 mg  500 mg Per NG Tube Daily PRN    acetaminophen (Tylenol) tab 650 mg  650 mg Oral Q4H PRN    Or    acetaminophen (Tylenol) 160 MG/5ML oral liquid 650 mg  650 mg Per NG Tube Q4H PRN    Or    acetaminophen (Tylenol) rectal suppository 650 mg  650 mg Rectal Q4H PRN    polyethylene glycol (PEG 3350) (Miralax) 17 g oral packet 17 g  17 g Per NG Tube Daily PRN    insulin aspart (NovoLOG) 100 Units/mL FlexPen 1-7 Units  1-7 Units Subcutaneous q6h    heparin (Porcine) 1000 UNIT/ML injection 1,500 Units  1.5 mL Intravenous PRN Dialysis    lidocaine-prilocaine (Emla) 2.5-2.5 % cream   Topical PRN    bisacodyl (Dulcolax) 10 MG rectal suppository 10 mg  10 mg Rectal Daily PRN    chlorhexidine gluconate (Peridex) 0.12 % oral solution 15 mL  15 mL Mouth/Throat BID@0800,2000    albuterol (Ventolin HFA) 108 (90 Base) MCG/ACT inhaler 2 puff  2 puff Inhalation Q4H PRN    fluticasone propionate (Flonase) 50 MCG/ACT nasal suspension 2 spray  2 spray Nasal Daily PRN    glucose (Dex4) 15 GM/59ML oral liquid 15 g  15 g Oral Q15 Min PRN    Or    glucose (Glutose) 40% oral gel 15 g  15 g Oral Q15 Min PRN    Or    glucose-vitamin C (Dex-4) chewable tab 4 tablet  4 tablet Oral Q15 Min PRN    Or    dextrose 50% injection 50 mL  50 mL Intravenous Q15 Min PRN    Or    glucose (Dex4) 15 GM/59ML oral liquid 30 g  30 g Oral Q15 Min PRN    Or    glucose (Glutose) 40% oral gel 30 g  30 g Oral Q15 Min PRN    Or    glucose-vitamin C (Dex-4) chewable tab 8 tablet  8 tablet Oral Q15 Min PRN    ipratropium-albuterol (Duoneb) 0.5-2.5 (3) MG/3ML inhalation solution 3 mL  3 mL Nebulization Q6H PRN    fluticasone-salmeterol (Advair Diskus) 250-50 MCG/ACT inhaler 1 puff  1 puff Inhalation BID              Results:     Recent Labs   Lab 10/01/24  2818  10/02/24  0450 10/03/24  0421 10/04/24  0307 10/04/24  1411 10/05/24  0338 10/07/24  0407   RBC 3.40*   < > 3.44* 3.29*  --   --  2.68*   HGB 10.5*   < > 10.3* 9.9* 9.1* 7.8* 7.9*   HCT 31.8*   < > 31.0* 30.4* 28.0* 23.7* 24.7*   MCV 93.5   < > 90.1 92.4  --   --  92.2   NEPRELIM 8.99*  --   --  6.51  --   --  11.71*   WBC 10.7   < > 8.5 8.3  --   --  12.6*   .0*   < > 176.0 182.0  --   --  222.0    < > = values in this interval not displayed.     Recent Labs   Lab 10/01/24  0330 10/02/24  0437 10/04/24  0307 10/05/24  0338 10/07/24  0409   *   < > 261* 189* 311*   BUN 36*   < > 70* 39* 65*   CREATSERUM 3.98*   < > 6.05* 4.01* 6.84*   CA 9.8   < > 10.1 8.7 10.0   ALB 3.9  --   --   --  3.6      < > 137 135* 126*   K 3.5   < > 3.4* 3.3* 4.6   CL 99   < > 99 99 92*   CO2 29.0   < > 25.0 26.0 22.0   ALKPHO 63  --   --   --   --    AST 22  --   --   --   --    ALT 9*  --   --   --   --    BILT 1.2*  --   --   --   --    TP 7.0  --   --   --   --     < > = values in this interval not displayed.     PTT   Date Value Ref Range Status   08/09/2024 30.1 23.0 - 36.0 seconds Final     INR   Date Value Ref Range Status   08/09/2024 1.45 (H) 0.80 - 1.20 Final     Comment:     Therapeutic INR range for patients on warfarin:  2.0-3.0 for most medical conditions and surgical prophylaxis   2.5-3.5 for mechanical heart valves and recurrent thromboembolism    Direct thrombin inhibitors (e.g. argatroban, bivalirudin), factor Xa inhibitors (e.g. apixaban, rivaroxaban), and conditions such as coagulation factor deficiency, vitamin K deficiency, and liver disease will prolong the prothrombin time/INR.    Unfractionated heparin and low molecular weight heparin do not affect the prothrombin time/INR up to a concentration of 1 IU/mL and 1.5 IU/mL, respectively.         No results for input(s): \"BNP\" in the last 168 hours.  Recent Labs   Lab 10/02/24  0437 10/07/24  0409   MG 2.2  --    PHOS  --  9.6*        Recent Labs    Lab 10/01/24  0330 10/07/24  0409   PHOS  --  9.6*   ALB 3.9 3.6       No results found.        Assessment and Plan:       79 year old male with past medical history of T2DM, HTN, HLD, ESRD on HD MWF, CAD s/p PCI (5/2024), A.fib on Eliquis, HFpEF, KRAIG, BPH, history of recurrent gastrointestinal bleeding presented to the ER after a fall sustaining a left hip fracture    1.ESRD: HD Monday Wednesday Friday  HD today-tolerating well.  Status post 2.5 L UF  Next HD on Wednesday   hyperphosphatemia: Currently NPO.  Failed swallow eval.  Once able to tolerate po , will start patient on Phos binder and low phos diet    2.anemia: Decline in hemoglobin noted  Patient with history of recurrent GI bleed.  S/p EGD and colonoscopy in July 2024  Hemoglobin 10.5->7.9.  Repeat in a.m.  Aspirin and Plavix on hold  On IV Protonix twice daily  On Epogen-will give couple of doses of IV iron    3.coronary artery disease status post PCI in May 2024/paroxysmal A-fib status post Watchman device  Cardiology input noted    4.acute respiratory failure: Presumably secondary to aspiration/pulmonary edema  S/p extubation 10/6.  Currently on 2 L nasal cannula and tolerating well    5.left hip fracture: S/p left total hip arthroplasty on 10/4    Discussed with nursing and Dr. Rey and dialysis Nurse at bedside     Walker Sheppard MD  10/7/2024

## 2024-10-07 NOTE — PHYSICAL THERAPY NOTE
Physical Therapy Contact Note    Orders received and chart reviewed. Attempted to see patient for Physical Therapy services. Patient currently receiving bedside dialysis. Will re-schedule visit.    14:00 Re-attempted patient. Patient and spouse at bedside politely declining therapy services at this time due to fatigue. Educated patient on importance of participation in therapy, continues to decline. Requesting therapist return the following day for out of bed mobility. Able to obtain subjective info from patient/spouse at bedside:    HOME SITUATION  Type of Home: House  Home Layout: One level;Ramped entrance  Stairs to Enter : 4   Lives With: Spouse (would assist with LE dressing)  Drives: No   Patient Regularly Uses: Rolling walker (was receiving home therapy prior to admission) Patient able to ambulate in the community and perform errands (would walk to bank/grocery store near home)    Encouraged patient to perform bed level exercises, verbalizes understanding. Will re-schedule visit.    Amaris Martinez, PT, DPT

## 2024-10-07 NOTE — PLAN OF CARE
No acute events overnight. Duo-nebs given x2. Fentanyl gtt. Wound vac intact. VSS. Bipap worn for the majority of the night. Safety maintained. Call light within reach. Plan of care ongoing. HD today.     Problem: Patient Centered Care  Goal: Patient preferences are identified and integrated in the patient's plan of care  Description: Interventions:  - What would you like us to know as we care for you?   - Provide timely, complete, and accurate information to patient/family  - Incorporate patient and family knowledge, values, beliefs, and cultural backgrounds into the planning and delivery of care  - Encourage patient/family to participate in care and decision-making at the level they choose  - Honor patient and family perspectives and choices  Outcome: Progressing     Problem: Diabetes/Glucose Control  Goal: Glucose maintained within prescribed range  Description: INTERVENTIONS:  - Monitor Blood Glucose as ordered  - Assess for signs and symptoms of hyperglycemia and hypoglycemia  - Administer ordered medications to maintain glucose within target range  - Assess barriers to adequate nutritional intake and initiate nutrition consult as needed  - Instruct patient on self management of diabetes  Outcome: Progressing     Problem: PAIN - ADULT  Goal: Verbalizes/displays adequate comfort level or patient's stated pain goal  Description: INTERVENTIONS:  - Encourage pt to monitor pain and request assistance  - Assess pain using appropriate pain scale  - Administer analgesics based on type and severity of pain and evaluate response  - Implement non-pharmacological measures as appropriate and evaluate response  - Consider cultural and social influences on pain and pain management  - Manage/alleviate anxiety  - Utilize distraction and/or relaxation techniques  - Monitor for opioid side effects  - Notify MD/LIP if interventions unsuccessful or patient reports new pain  - Anticipate increased pain with activity and pre-medicate  as appropriate  Outcome: Progressing     Problem: SAFETY ADULT - FALL  Goal: Free from fall injury  Description: INTERVENTIONS:  - Assess pt frequently for physical needs  - Identify cognitive and physical deficits and behaviors that affect risk of falls.  - [place] fall precautions as indicated by assessment.  - Educate pt/family on patient safety including physical limitations  - Instruct pt to call for assistance with activity based on assessment  - Modify environment to reduce risk of injury  - Provide assistive devices as appropriate  - Consider OT/PT consult to assist with strengthening/mobility  - Encourage toileting schedule  Outcome: Progressing     Problem: DISCHARGE PLANNING  Goal: Discharge to home or other facility with appropriate resources  Description: INTERVENTIONS:  - Identify barriers to discharge w/pt and caregiver  - Include patient/family/discharge partner in discharge planning  - Arrange for needed discharge resources and transportation as appropriate  - Identify discharge learning needs (meds, wound care, etc)  - Arrange for interpreters to assist at discharge as needed  - Consider post-discharge preferences of patient/family/discharge partner  - Complete POLST form as appropriate  - Assess patient's ability to be responsible for managing their own health  - Refer to Case Management Department for coordinating discharge planning if the patient needs post-hospital services based on physician/LIP order or complex needs related to functional status, cognitive ability or social support system  Outcome: Progressing     Problem: CARDIOVASCULAR - ADULT  Goal: Maintains optimal cardiac output and hemodynamic stability  Description: INTERVENTIONS:  - Monitor vital signs, rhythm, and trends  - Monitor for bleeding, hypotension and signs of decreased cardiac output  - Evaluate effectiveness of vasoactive medications to optimize hemodynamic stability  - Monitor arterial and/or venous puncture sites for  bleeding and/or hematoma  - Assess quality of pulses, skin color and temperature  - Assess for signs of decreased coronary artery perfusion - ex. Angina  - Evaluate fluid balance, assess for edema, trend weights  Outcome: Progressing  Goal: Absence of cardiac arrhythmias or at baseline  Description: INTERVENTIONS:  - Continuous cardiac monitoring, monitor vital signs, obtain 12 lead EKG if indicated  - Evaluate effectiveness of antiarrhythmic and heart rate control medications as ordered  - Initiate emergency measures for life threatening arrhythmias  - Monitor electrolytes and administer replacement therapy as ordered  Outcome: Progressing     Problem: RESPIRATORY - ADULT  Goal: Achieves optimal ventilation and oxygenation  Description: INTERVENTIONS:  - Assess for changes in respiratory status  - Assess for changes in mentation and behavior  - Position to facilitate oxygenation and minimize respiratory effort  - Oxygen supplementation based on oxygen saturation or ABGs  - Provide Smoking Cessation handout, if applicable  - Encourage broncho-pulmonary hygiene including cough, deep breathe, Incentive Spirometry  - Assess the need for suctioning and perform as needed  - Assess and instruct to report SOB or any respiratory difficulty  - Respiratory Therapy support as indicated  - Manage/alleviate anxiety  - Monitor for signs/symptoms of CO2 retention  Outcome: Progressing     Problem: GASTROINTESTINAL - ADULT  Goal: Maintains or returns to baseline bowel function  Description: INTERVENTIONS:  - Assess bowel function  - Maintain adequate hydration with IV or PO as ordered and tolerated  - Evaluate effectiveness of GI medications  - Encourage mobilization and activity  - Obtain nutritional consult as needed  - Establish a toileting routine/schedule  - Consider collaborating with pharmacy to review patient's medication profile  Outcome: Progressing

## 2024-10-07 NOTE — SLP NOTE
ADULT SWALLOWING EVALUATION    ASSESSMENT    ASSESSMENT/OVERALL IMPRESSION:  PPE REQUIRED. THIS THERAPIST WORE GLOVES FOR DURATION OF EVALUATION. HANDS WASHED UPON ENTRANCE/EXIT.    Per MD H&P, \"Patient is a 79-year-old male with past medical history of multiple comorbid conditions but come to the hospital.  Suffering a minimally displaced left femoral neck fracture.  Patient seen examined emergency department currently states that the pain is uncontrolled, orthopedic surgery has been placed on consult plan to get a CT scan of the hip.  Plan is for surgery for tomorrow.\"    SLP BSSE orders received and acknowledged. A swallow evaluation warranted per s/p extubation. Pt afebrile with weak vocal quality, on 3L/Min, with oxygen saturation at 99%. Pt with hx of dysphagia at [facility], last seen 6/18/24 with recommendations for regular/thin liquids.   Pt positioned 90 degrees in bed, alert/cooperative/spouse present. Pt with no complaints of pain. Oral motor skills within functional limits. Pt presented with trials of mildly thick liquids via tsp. Pt with adequate oral acceptance and bilabial seal across all trials. Pt with intact bolus formation/propulsion. Pt's swallow response appears delayed with reduced hyolaryngeal elevation/excursion. Delayed cough, throat clear, and wet vocal quality observed. 10/5 CXR indicates \"Mild bibasilar opacity in suspected trace right pleural effusion not significantly changed\". Oxygen status remained stable t/o the entire evaluation.     At this time, pt presents with functional oral swallow stage and probable pharyngeal dysfunction. Recommend remain NPO with ongoing clinical swallow re assessment to determine tx plan. Results and recommendations reviewed with RN, pt, and family. Pt/pt's family v/u to all results/recommendations.        RECOMMENDATIONS   Diet Recommendations - Solids: NPO  Diet Recommendations - Liquids: NPO                          Medication Administration Recommendations:  Non-oral  Treatment Plan/Recommendations: SLP to reassess;Aspiration precautions    HISTORY   MEDICAL HISTORY  Reason for Referral:  (s/p extubation)    Problem List  Principal Problem:    Closed displaced midcervical fracture of left femur with delayed healing  Active Problems:    ESRD (end stage renal disease) (HCC)    Closed fracture of left hip (HCC)      Past Medical History  Past Medical History:    Anemia    Asthma (HCC)    Back problem    BPH (benign prostatic hyperplasia)    Calculus of kidney    Cataract    Coronary atherosclerosis    Diabetes (HCC)    ESRD (end stage renal disease) on dialysis (HCC)    Essential hypertension    High blood pressure    High cholesterol    History of blood transfusion    Hyperlipidemia    Neuropathy    hands and feet    KRAIG on CPAP    Renal disorder    Sleep apnea    Visual impairment    glasses    Vocal cord paralysis, unilateral partial       Prior Living Situation: Home with spouse  Diet Prior to Admission: Regular;Thin liquids  Precautions: Aspiration    Patient/Family Goals: not to return to hospital    SWALLOWING HISTORY  Current Diet Consistency: NPO  Dysphagia History: see above; VFSS 4/18/24 with recommendations for regular/thin liquids  Imaging Results: 9/30 CT CHEST  CONCLUSION:   1. No evidence of acute pulmonary embolism to the level of the proximal segmental pulmonary artery branches.   2. Bilateral pleural effusions and associated basilar atelectasis, with or without superimposed pneumonia.   3. Mild pulmonary interstitial edema is observed.   4. Dilatation of the main pulmonary artery trunk may relate to underlying pulmonary hypertension.   5. Mild ascending thoracic aortic ectasia.   6. Mild centrilobular emphysema.   7. Additional support tubes and lines as above.   8. A 1.9 cm right thyroid lobe nodule is present. If not previously worked up, follow-up nonemergent thyroid sonography can be considered for further assessment.   9. Lesser incidental findings  as above.       9/28 CT BRAIN  CONCLUSION:   1. No acute intracranial hemorrhage, midline shift, mass effect, or hydrocephalus.   2. No transcortical area of hypoattenuation to suggest an acute infarct.  If there is clinical concern for an acute infarct, consider further evaluation with an MRI of the brain.   3. Moderate chronic microvascular ischemic changes with a small chronic left thalamus infarct.   4. No displaced calvarial fracture.   5. Lesser incidental findings described above.     SUBJECTIVE       OBJECTIVE   ORAL MOTOR EXAMINATION  Dentition: Functional  Symmetry: Within Functional Limits  Strength: Within Functional Limits  Tone: Within Functional Limits  Range of Motion: Within Functional Limits  Rate of Motion: Within Functional Limits    Voice Quality: Weak  Respiratory Status: Supplemental O2;Nasal cannula  Consistencies Trialed: Nectar thick liquids  Method of Presentation: Staff/Clinician assistance;Spoon  Patient Positioning: Upright;Midline    Oral Phase of Swallow: Within Functional Limits                    Pharyngeal Phase of Swallow: Impaired  Laryngeal Elevation Timing: Appears impaired  Laryngeal Elevation Strength: Appears impaired  Laryngeal Elevation Coordination: Appears intact  (Please note: Silent aspiration cannot be evaluated clinically. Videofluoroscopic Swallow Study is required to rule-out silent aspiration.)    Esophageal Phase of Swallow: No complaints consistent with possible esophageal involvement  Comments: NA          GOALS  Goal #1 Pt will participate in ongoing clinical swallow re assessment to determine tx plan  In Progress     FOLLOW UP  Treatment Plan/Recommendations: SLP to reassess;Aspiration precautions  Number of Visits to Meet Established Goals: 1  Follow Up Needed (Documentation Required): Yes  SLP Follow-up Date: 10/08/24    Thank you for your referral.   If you have any questions, please contact ARTEMIO Sawant M.S. CCC-SLP  Speech Language  Pathologist  Phone Number Ext. [phone]

## 2024-10-07 NOTE — PROGRESS NOTES
[facility]      [facility] Cardiology Progress Note    Ollie Hernández Patient Status:  Inpatient    1945 MRN [id number]   Location [facility] 2W/SW Attending Pranay Michel MD   Hosp Day # 9 PCP Wili Parmar MD       Impression/Plan:  79 year old male presenting with:    Impression:  Hip fracture Left - S/P L anterior total hip arthroplasty 10/4/24  Acute resp failure, aspiration and/or pulmonary edema; intubated 2024 extubated 10/6  -Failed weaning trial yesterday  ESRD on HD  DM  PAF, currently SR. S/p watchman device 24  HTN  HL, on statin PTA  Acute on chronic diastolic CHF  CAD s/p [place] circ 24   Coffee grounds in OG tube  -Slight decline in Hb post-op  Elevated troponin, likely demand ischemia    Plan:  - Cont carvedilol for rate control monitor for hypotension   - Cont statin  - Asa/plavix on hold (recent Watchman implant 2024); Recheck Hb in Am - resume once safely able and taking PO   - HD as per nephrology  - Vent management as per pulm.    - Monitor on tele  - Reviewed w/ family at bedside      Subjective:     Sore throat a little SOB  Appears comfortable.    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)    Atherosclerosis of native arteries of extremities with intermittent claudication, bilateral legs (HCC)    Primary hypertension    Smokers' cough (HCC)    Unstable angina (HCC)    Lower GI bleed    ESRD (end stage renal disease) on  dialysis (HCC)    PAF (paroxysmal atrial fibrillation) ([facility])    AVM (arteriovenous malformation) of colon    ABLA (acute blood loss anemia)    Rectal bleeding    Anticoagulated    Antiplatelet or antithrombotic long-term use    Lower GI bleeding    ESRD on hemodialysis ([facility])    GI bleed    Closed displaced midcervical fracture of left femur with delayed healing    ESRD (end stage renal disease) ([facility])    Closed fracture of left hip ([facility])       Objective:   Temp: 97.5 °F (36.4 °C)  Pulse: 80  Resp: 17  BP: 136/54    Intake/Output:     Intake/Output Summary (Last 24 hours) at 10/7/2024 1315  Last data filed at 10/7/2024 1300  Gross per 24 hour   Intake 71.65 ml   Output 50 ml   Net 21.65 ml       Last 3 Weights   10/05/24 0600 144 lb 10 oz (65.6 kg)   10/04/24 0600 145 lb 8.1 oz (66 kg)   10/02/24 1310 156 lb 4.9 oz (70.9 kg)   10/02/24 0800 143 lb 11.8 oz (65.2 kg)   10/01/24 0600 160 lb 0.9 oz (72.6 kg)   09/30/24 0400 164 lb 0.4 oz (74.4 kg)   09/28/24 1649 155 lb 11.2 oz (70.6 kg)   08/22/24 1311 159 lb 12.8 oz (72.5 kg)   08/20/24 0933 158 lb (71.7 kg)       Tele: NSR PAC' s    Physical Exam:    General: Intubated, sedated  HEENT: Normocephalic, anicteric sclera ET/OG tube   Neck: supple  Cardiac: Regular rate and rhythm. S1, S2 normal. No murmur, pericardial rub, S3, thrill, heave or extra cardiac sounds.  Lungs: Coarse breath sounds bilat  Abdomen: Soft, non-distended  Extremities: Without clubbing, cyanosis or edema.  Peripheral pulses are 2+. SCD boots   Neurologic: Intubated  Skin: Warm and dry.     Laboratory/Data:    Labs:         Recent Labs   Lab 10/01/24  0330 10/02/24  0450 10/03/24  0421 10/04/24  0307 10/04/24  1411 10/05/24  0338 10/07/24  0407   WBC 10.7 8.1 8.5 8.3  --   --  12.6*   HGB 10.5* 9.3* 10.3* 9.9* 9.1* 7.8* 7.9*   MCV 93.5 89.9 90.1 92.4  --   --  92.2   .0* 159.0 176.0 182.0  --   --  222.0       Recent Labs   Lab 10/02/24  0437 10/03/24  0421 10/04/24  0307 10/05/24  0338  10/07/24  0409    140 137 135* 126*   K 3.4* 4.0 3.4* 3.3* 4.6   CL 98 100 99 99 92*   CO2 24.0 27.0 25.0 26.0 22.0   BUN 61* 48* 70* 39* 65*   CREATSERUM 5.88* 4.67* 6.05* 4.01* 6.84*   CA 9.9 10.4 10.1 8.7 10.0   MG 2.2  --   --   --   --    PHOS  --   --   --   --  9.6*   * 215* 261* 189* 311*       Recent Labs   Lab 10/01/24  0330 10/07/24  0409   ALT 9*  --    AST 22  --    ALB 3.9 3.6       No results for input(s): \"TROP\" in the last 168 hours.    Allergies:   Allergies   Allergen Reactions    Adhesive Tape OTHER (SEE COMMENTS)     Severe rashes    Dust Mite Extract RASH       Medications:  Current Facility-Administered Medications   Medication Dose Route Frequency    HYDROmorphone (Dilaudid) 1 MG/ML injection 0.1 mg  0.1 mg Intravenous Q2H PRN    Or    HYDROmorphone (Dilaudid) 1 MG/ML injection 0.2 mg  0.2 mg Intravenous Q2H PRN    Or    HYDROmorphone (Dilaudid) 1 MG/ML injection 0.4 mg  0.4 mg Intravenous Q2H PRN    methylPREDNISolone sodium succinate (Solu-MEDROL) injection 40 mg  40 mg Intravenous Q8H    sodium chloride 0.9 % IV bolus 100 mL  100 mL Intravenous Q30 Min PRN    And    albumin human (Albumin) 25% injection 25 g  25 g Intravenous PRN Dialysis    epoetin donna (Epogen, Procrit) 5,000 Units injection  5,000 Units Subcutaneous Once per day on Monday Wednesday Friday    fluconazole (Diflucan) tab 100 mg  100 mg Oral Nightly    metoclopramide (Reglan) 5 mg/mL injection 10 mg  10 mg Intravenous Q24H PRN    sodium chloride 0.9 % irrigation    Continuous PRN    sodium chloride 0.9 % irrigation    Continuous PRN    ondansetron (Zofran) 4 MG/2ML injection 4 mg  4 mg Intravenous Q6H PRN    diphenhydrAMINE (Benadryl) cap/tab 25 mg  25 mg Oral Q4H PRN    Or    diphenhydrAMINE (Benadryl) 50 mg/mL  injection 12.5 mg  12.5 mg Intravenous Q4H PRN    heparin (Porcine) 5000 UNIT/ML injection 5,000 Units  5,000 Units Subcutaneous Q8H STEPHANIE    dextrose 10% infusion (TPN no rate)   Intravenous Continuous  PRN    pancrelipase (Lip-Prot-Amyl) (Zenpep) DR particles cap 10,000 Units  10,000 Units Per G Tube PRN    And    sodium bicarbonate tab 325 mg  325 mg Oral PRN    senna (Senokot) 8.8 MG/5ML oral syrup 17.6 mg  10 mL Oral BID    docusate (Colace) 50 MG/5ML oral solution 100 mg  100 mg Oral BID    mineral oil-white petrolatum (Artificial Tears) 83-15 % ophthalmic ointment 1 Application  1 Application Both Eyes Nightly    lidocaine-menthol 4-1 % patch 2 patch  2 patch Transdermal Daily    pantoprazole (Protonix) 40 mg in sodium chloride 0.9% PF 10 mL IV push  40 mg Intravenous Q12H    hydrALAzine (Apresoline) 20 mg/mL injection 10 mg  10 mg Intravenous Q6H PRN    labetalol (Trandate) 5 mg/mL injection 10 mg  10 mg Intravenous Q4H PRN    cetirizine (ZyrTEC) tab 5 mg  5 mg Per NG Tube Daily    carvedilol (Coreg) tab 25 mg  25 mg Per NG Tube BID    atorvastatin (Lipitor) tab 40 mg  40 mg Per NG Tube Nightly    acetaminophen (Tylenol) 160 MG/5ML oral liquid 500 mg  500 mg Per NG Tube Daily PRN    acetaminophen (Tylenol) tab 650 mg  650 mg Oral Q4H PRN    Or    acetaminophen (Tylenol) 160 MG/5ML oral liquid 650 mg  650 mg Per NG Tube Q4H PRN    Or    acetaminophen (Tylenol) rectal suppository 650 mg  650 mg Rectal Q4H PRN    polyethylene glycol (PEG 3350) (Miralax) 17 g oral packet 17 g  17 g Per NG Tube Daily PRN    insulin aspart (NovoLOG) 100 Units/mL FlexPen 1-7 Units  1-7 Units Subcutaneous q6h    heparin (Porcine) 1000 UNIT/ML injection 1,500 Units  1.5 mL Intravenous PRN Dialysis    lidocaine-prilocaine (Emla) 2.5-2.5 % cream   Topical PRN    bisacodyl (Dulcolax) 10 MG rectal suppository 10 mg  10 mg Rectal Daily PRN    chlorhexidine gluconate (Peridex) 0.12 % oral solution 15 mL  15 mL Mouth/Throat BID@0800,2000    albuterol (Ventolin HFA) 108 (90 Base) MCG/ACT inhaler 2 puff  2 puff Inhalation Q4H PRN    fluticasone propionate (Flonase) 50 MCG/ACT nasal suspension 2 spray  2 spray Nasal Daily PRN    glucose (Dex4)  15 GM/59ML oral liquid 15 g  15 g Oral Q15 Min PRN    Or    glucose (Glutose) 40% oral gel 15 g  15 g Oral Q15 Min PRN    Or    glucose-vitamin C (Dex-4) chewable tab 4 tablet  4 tablet Oral Q15 Min PRN    Or    dextrose 50% injection 50 mL  50 mL Intravenous Q15 Min PRN    Or    glucose (Dex4) 15 GM/59ML oral liquid 30 g  30 g Oral Q15 Min PRN    Or    glucose (Glutose) 40% oral gel 30 g  30 g Oral Q15 Min PRN    Or    glucose-vitamin C (Dex-4) chewable tab 8 tablet  8 tablet Oral Q15 Min PRN    ipratropium-albuterol (Duoneb) 0.5-2.5 (3) MG/3ML inhalation solution 3 mL  3 mL Nebulization Q6H PRN    fluticasone-salmeterol (Advair Diskus) 250-50 MCG/ACT inhaler 1 puff  1 puff Inhalation BID

## 2024-10-07 NOTE — SPIRITUAL CARE NOTE
Spiritual Care Visit Note    Patient Name: Ollie Hernández Date of Spiritual Care Visit: 10/07/24   : 1945 Primary Dx: Closed displaced midcervical fracture of left femur with delayed healing       Referred By: Referral From:     Spiritual Care Taxonomy:    Intended Effects: Demonstrate caring and concern    Methods: Collaborate with care team member;Offer support    Interventions: Active listening;Ask guided questions;Acknowledge response to difficult experience;Discuss concerns;Discuss spirituality/Zoroastrianism with someone;Mobile    Visit Type/Summary:     - Spiritual Care: Consulted with RN prior to visit. Offered empathic listening and emotional support. Patient and family expressed appreciation for  visit. Provided information regarding how to contact Spiritual Care and left a Spiritual Care information card. Provided support for Patient's spiritual/Rastafarian requests. Offered prayer. Patient expressed his love for his wife and God. Patient is hopeful. Wife, Stephy is at bedside. No other need at this time.    CHANCE Lindsay CAMII   [id number]     Spiritual Care support can be requested via an Epic consult. For urgent/immediate needs, please contact the On Call  at: Jennifer: ext [phone]

## 2024-10-07 NOTE — PROGRESS NOTES
[facility]  part of [facility]    Progress Note    Ollie Hernández Patient Status:  Inpatient    1945 MRN [id number]   Location [facility] 2W/SW Attending Pranay Michel MD   Hosp Day # 9 PCP Wili Parmar MD     Chief Complaint:   Chief Complaint   Patient presents with    Fall       Subjective:   Ollie Hernández   Status post left hip arthroplasty on 10/4/2024.      Patient successfully extubated 10/6/2024.  Currently on 3 L nasal cannula however failed swallow evaluation.  Still complaining of pain but will attempt to wean off fentanyl drip and transition to IV push narcotics today.      No other acute complaints or other nursing concerns or overnight events    Objective:   Objective:    Blood pressure (!) 177/65, pulse 84, temperature 97.5 °F (36.4 °C), temperature source Temporal, resp. rate 23, height 5' 4.02\" (1.626 m), weight 144 lb 10 oz (65.6 kg), SpO2 99%.    Physical Exam:    General: Alert comfortable in no acute distress on 3 L nasal cannula  HEENT: Normocephalic, anicteric sclera   Neck: supple  Cardiac: Regular rate and rhythm. S1, S2 normal. No murmurs thrills or rubs  Lungs: Coarse breath sounds bilat  Abdomen: Soft, non-distended  Extremities: Without clubbing, cyanosis or edema.   Skin: Warm and dry.       Results:   Results:    Labs:  Recent Labs   Lab 10/01/24  0330 10/02/24  0450 10/03/24  0421 10/04/24  0307 10/04/24  1411 10/05/24  0338 10/07/24  0407   WBC 10.7 8.1 8.5 8.3  --   --  12.6*   HGB 10.5* 9.3* 10.3* 9.9* 9.1* 7.8* 7.9*   MCV 93.5 89.9 90.1 92.4  --   --  92.2   .0* 159.0 176.0 182.0  --   --  222.0       Recent Labs   Lab 10/01/24  0330 10/02/24  0437 10/04/24  0307 10/05/24  0338 10/07/24  0409   *   < > 261* 189* 311*   BUN 36*   < > 70* 39* 65*   CREATSERUM 3.98*   < > 6.05* 4.01* 6.84*   CA 9.8   < > 10.1 8.7 10.0   ALB 3.9  --   --   --  3.6      < > 137 135* 126*   K 3.5   < > 3.4* 3.3* 4.6   CL 99   < > 99 99  92*   CO2 29.0   < > 25.0 26.0 22.0   ALKPHO 63  --   --   --   --    AST 22  --   --   --   --    ALT 9*  --   --   --   --    BILT 1.2*  --   --   --   --    TP 7.0  --   --   --   --     < > = values in this interval not displayed.       Estimated Creatinine Clearance: 7.6 mL/min (A) (based on SCr of 6.84 mg/dL (H)).    No results for input(s): \"PTP\", \"INR\" in the last 168 hours.         Culture:  Hospital Encounter on 09/28/24   1. Blood Culture     Status: None    Collection Time: 09/30/24 10:03 AM    Specimen: Bld,Arterial Line; Blood   Result Value Ref Range    Blood Culture Result No Growth 5 Days N/A       Cardiac  No results for input(s): \"TROP\", \"PBNP\" in the last 168 hours.      Imaging: Imaging data reviewed in [facility].  XR CHEST AP PORTABLE  (CPT=71045)    Result Date: 10/5/2024  CONCLUSION:   Mild bibasilar opacity in suspected trace right pleural effusion not significantly changed.    Dictated by (CST): Kevin Hensley MD on 10/05/2024 at 10:18 AM     Finalized by (CST): Kevin Hensley MD on 10/05/2024 at 10:19 AM           Medications:    methylPREDNISolone  60 mg Intravenous Q6H    epoetin donna  5,000 Units Subcutaneous Once per day on Monday Wednesday Friday    fluconazole  100 mg Oral Nightly    heparin  5,000 Units Subcutaneous Q8H STEPHANIE    senna  10 mL Oral BID    docusate  100 mg Oral BID    mineral oil-white petrolatum  1 Application Both Eyes Nightly    lidocaine-menthol  2 patch Transdermal Daily    pantoprazole  40 mg Intravenous Q12H    cetirizine  5 mg Per NG Tube Daily    carvedilol  25 mg Per NG Tube BID    atorvastatin  40 mg Per NG Tube Nightly    insulin aspart  1-7 Units Subcutaneous q6h    chlorhexidine gluconate  15 mL Mouth/Throat BID@0800,2000    fluticasone-salmeterol  1 puff Inhalation BID         Assessment and Plan:   Assessment & Plan:      Acute hypoxic respiratory failure   Hypotension   Aspiration PNA  Pulmonary and cardiology on consult.   Secondary to pulmonary edema  and aspiration  Intubated 9/29 - cont full vent support.   Empiric zosyn.   Sputum cx normal julio c. Blood cx x 4 NGTD  Daily SBT.   Lactic acid normal. WBC trending down   Volume management per nephrology   CXR pulmonary edema. Improved alveolar opacities.   Wean pressors as able to keep MAP > 65 or sBP > 90   10/6: extubated  10/7: now on 3L O2NC, repeat CXR, SLP, PT , OT        ESRD   HD per nephrology MWF  Nephro on consult.   10/7: HD today    Thrush  Questionable thrush on exam?  Started p.o. fluconazole through OG 10/4    Hip fracture   S/p fall PTA -status post left hip arthroplasty on 10/4/2024  Orthopedic surgery on consult.   High risk for surgery  however surgery is necessary.   10/7: attempt to wean off fentanyl gtt, transition to IV dilaudid panel    DM II   A1c   ISS     Paroxysmal A.fib - currently NSR   Acute on chronic HFpEF  CAD s/p jodi circ 5/24'   Elevate troponin secondary to demand ischemia   S/p watchman device 9/11/24  Cont coreg, statin  No further cardiac testing prior to planned surgery.   Resume ASA/Plavix once safely able to do so.     Coffee ground emesis   Resolved.   IV protonix BID   Hemoglobin downtrending postoperatively from 9.1-7.8.  Continue to monitor closely       >55min spent, >50% spent counseling and coordinating care in the form of educating pt/family and d/w consultants and staff. Most of the time spent discussing the above plan.        Plan of care discussed with patient or family at bedside.    Lizbeth Rey MD            Supplementary Documentation:     Quality:  DVT Prophylaxis: heparin   CODE status: Full  Dispo: per clinical course            Estimated date of discharge: TBD  Discharge is dependent on: clinical stability  At this point Mr. Hernández is expected to be discharge to: TBD

## 2024-10-07 NOTE — PROGRESS NOTES
Critical Care Progress Note     Assessment / Plan:  Acute respiratory failure - due to pulmonary edema and/or aspiration. Intubated 9/29. CXR with interval improvement  - extubated 10/6  - s/p Zosyn x 5 days  - no stridor on exam this morning, wean steroids and stop racemic epi  - volume management with HD  ESRD - hyponatremic on labs today  - HD per nephrology  KRAIG  - on CPAP at home, resumed here  Asthma  - Advair in lieu of Symbicort  Coffee ground output from OGT - resolved, hgb uptrending  - IV Protonix BID  CV: h/o pAFib  - S/p watchman 9/2024.   - Per cardiology.   Anemia - due to above  - transfuse to keep hgb >7  Hip fracture after fall  - s/p L FRANCE 10/4  - per ortho.   DM  - SSI  FEN  - TFs on hold  Ppx  - subcu heparin on hold  - PPI  Dispo  - ICU    Critical care time: 35 minutes    Subjective:  Extubated yesterday, question of stridor post-extubation overnight, started on racemic epi and systemic steroids. Remaind on bipap most of the night    Objective:  Vitals:    10/07/24 0100 10/07/24 0200 10/07/24 0400 10/07/24 0600   BP: 156/55 152/59  (!) 177/65   BP Location:  Left arm  Left arm   Pulse: 83 81 79 84   Resp: 20 10 20 23   Temp:   97.5 °F (36.4 °C)    TempSrc:   Temporal    SpO2: 100% 100% 100% 99%   Weight:       Height:         Physical Exam:  General - laying in bed on HD  Respiratory - clear bilaterally, no upper airway sounds  Cardiovascular - regular rate and rhythm  Abdo - soft, NTND  Ext - no LE edema, Left hip dressing not taken down  Mental status - interactive, answering questions appropriately    Medications:  Reviewed in EMR    Lab Data:  Reviewed in EMR    Imaging:  I independently visualized all relevant chest imaging in PACS and agree with radiology interpretation except where noted.

## 2024-10-07 NOTE — DIETARY NOTE
ADULT NUTRITION REASSESSMENT    Pt is at high nutrition risk.  Pt does not meet malnutrition criteria.        RECOMMENDATIONS TO MD: Pt is day #9 overall poor nutrition intake (10/2-7 tube feeding ave daily intake ~537 kcals) OG out 10/6 with extubation. S/p BSSE and SLP recommendeding continue NPO status.  Will follow up tomorrow for diet advancer and add appropriate supplements vs if has to remain NPO need to replace feeding tube for EN support.     ADMITTING DIAGNOSIS:  Closed fracture of left hip, initial encounter ([facility]) [S72.002A]  PERTINENT PAST MEDICAL HISTORY:   Past Medical History:    Anemia    Asthma ([facility])    Back problem    BPH (benign prostatic hyperplasia)    Calculus of kidney    Cataract    Coronary atherosclerosis    Diabetes ([facility])    ESRD (end stage renal disease) on dialysis ([facility])    Essential hypertension    High blood pressure    High cholesterol    History of blood transfusion    Hyperlipidemia    Neuropathy    hands and feet    KRAIG on CPAP    Renal disorder    Sleep apnea    Visual impairment    glasses    Vocal cord paralysis, unilateral partial       PATIENT STATUS: Initial 10/02/24: Pt admit for displaced left femoral neck fracture with uncontrolled pain-possible surgery planned. Pt intubated on 9/29 for respiratory failure. There was a concern for GI bleed thus held off on tube feeds. PMH as above.and notable for ESRD HD.   Pt assessed due to consult for tube feeding. Pt NPO day# 4. (See brief note per RD 9/30). Discussed with RN last pm re: potential tube feeding start pending Ortho surgeon jonathan . GI status improved. Propofol low dose in progress (~220 kcals/lipids) Pt screened at no nutrition risk on admit however weight hx reflects a 10% wt loss over past ~ 1 month: this is a severe weight loss. Will clarify with family as able.  Current BMI reflects wt/ht wnl. Diet hx to be obtained.   Appropriate to start Nepro formula-see orders below. No documentation of tube feed start- possible  holding for surgery or possible extubation? Will discuss at rounds today. Addendum: TF ordered late last pm- did not start.    10/7/24: s/p L FRANCE 10/4. Wound vac to L.hip. Tube feeds initiated 10/2 am but stopped 10/6 with extubation and OG tube out. Calculated ave tf intake 10/2-10/6 and only ~ 500 kcals/day- EN was held for high GRV10/3. Overall nutrition intake poor- not safe for po intake today s/p SLP BSSE. Met with Pt and spouse. Pt c/o \"sore throat\" so does not want to eat/swallow. Discussed nutrition plan of care. Per spouse pt had good appetite pta following renal diet, higher in protein per renal MD recommendations. Feels wt loss was not intentional but had adjusted diet somewhat for higher protein intake ie more fish, chicken, protein bars. Hope to advance diet tomorrow vs need replace feeding tube.     FOOD/NUTRITION RELATED HISTORY:  Appetite: Good PTA.   Intake: PTA good appetite/spouse and pt. Renal diet PTA follow with RDU RD.   Intake Meeting Needs: No  Percent Meals Eaten (last 3 days)       None           Food Allergies: No Known Food Allergies (NKFA)  Cultural/Ethnic/Yarsani Preferences: Not Obtained    GASTROINTESTINAL: +BM loose medium 10/5. OG out 10/6    MEDICATIONS: reviewed    sodium chloride      sodium chloride      dextrose 10% Stopped (10/05/24 1910)        methylPREDNISolone  40 mg Intravenous Q8H    epoetin donna  5,000 Units Subcutaneous Once per day on Monday Wednesday Friday    fluconazole  100 mg Oral Nightly    heparin  5,000 Units Subcutaneous Q8H STEPHANIE    senna  10 mL Oral BID    docusate  100 mg Oral BID    mineral oil-white petrolatum  1 Application Both Eyes Nightly    lidocaine-menthol  2 patch Transdermal Daily    pantoprazole  40 mg Intravenous Q12H    cetirizine  5 mg Per NG Tube Daily    carvedilol  25 mg Per NG Tube BID    atorvastatin  40 mg Per NG Tube Nightly    insulin aspart  1-7 Units Subcutaneous q6h    chlorhexidine gluconate  15 mL Mouth/Throat BID@0800,2000     fluticasone-salmeterol  1 puff Inhalation BID   Prn:   HYDROmorphone **OR** HYDROmorphone **OR** HYDROmorphone    sodium chloride **AND** albumin human    metoclopramide    sodium chloride    sodium chloride    ondansetron    diphenhydrAMINE **OR** diphenhydrAMINE    dextrose 10%    lipase-protease-amylase (Lip-Prot-Amyl) **AND** sodium bicarbonate    hydrALAzine    labetalol    acetaminophen    acetaminophen **OR** acetaminophen **OR** acetaminophen **OR** [DISCONTINUED] acetaminophen    polyethylene glycol (PEG 3350)    heparin    lidocaine-prilocaine    bisacodyl    albuterol    fluticasone propionate    glucose **OR** glucose **OR** glucose-vitamin C **OR** dextrose **OR** glucose **OR** glucose **OR** glucose-vitamin C    ipratropium-albuterol    LABS: reviewed CKD on HD. , 314- high on steroids and med dose reg insulin CF.Isoosmolar hyponatremia- Adjusted Na for high BG still remains low.  Renal follows for HD.  Phos high. No hx of Phos binder PTA.   Recent Labs     10/04/24  0307 10/05/24  0338 10/07/24  0409   * 189* 311*   BUN 70* 39* 65*   CREATSERUM 6.05* 4.01* 6.84*   CA 10.1 8.7 10.0    135* 126*   K 3.4* 3.3* 4.6   CL 99 99 92*   CO2 25.0 26.0 22.0   PHOS  --   --  9.6*   OSMOCALC 314* 294 292       NUTRITION RELATED PHYSICAL FINDINGS:  - Nutrition Focused Physical Exam (NFPE): well nourished per visual exam  - Fluid Accumulation: non-pitting Bilateral Upper extremity and generalized edema see RN documentation for details  - Skin Integrity: surgical wound(s) hip with vac.  see RN documentation for details    ANTHROPOMETRICS:  HT:  5'4\"   WT: 65.6 kg (144 lb 10 oz)   BMI: Body mass index is 24.81 kg/m².  BMI CLASSIFICATION: 19-24.9 kg/m2 - WNL  IBW: 130  lbs        110 % IBW  Usual Body Wt: 159 lbs 8/22/24      90% UBW (10% loss/1 month: severe loss.     WEIGHT HISTORY:  Patient Weight(s) for the past 336 hrs:   Weight   10/05/24 0600 65.6 kg (144 lb 10 oz)   10/04/24 0600 66 kg (145  lb 8.1 oz)   10/02/24 1310 70.9 kg (156 lb 4.9 oz)   10/02/24 0800 65.2 kg (143 lb 11.8 oz)   10/01/24 0600 72.6 kg (160 lb 0.9 oz)   09/30/24 0400 74.4 kg (164 lb 0.4 oz)   09/28/24 1649 70.6 kg (155 lb 11.2 oz)     Wt Readings from Last 10 Encounters:   10/05/24 65.6 kg (144 lb 10 oz)   08/22/24 72.5 kg (159 lb 12.8 oz)   08/20/24 71.7 kg (158 lb)   08/13/24 65.6 kg (144 lb 11.2 oz)   08/06/24 70.8 kg (156 lb)   08/01/24 66.5 kg (146 lb 8 oz)   06/27/24 73.9 kg (163 lb)   06/13/24 71 kg (156 lb 8 oz)   06/05/24 68.2 kg (150 lb 4.8 oz)   05/28/24 74.2 kg (163 lb 9.6 oz)     NUTRITION DIAGNOSIS/PROBLEM:   Inadequate oral intake related to Decreased ability to consume sufficient energy in the setting of intubation as evidenced by NPO, 0 nutrition intake.   NUTRITION DIAGNOSIS PROGRESS:  Improvement (unresolved) some improvement with partial nutrition via EN 10/2- 10/6.     NUTRITION INTERVENTION:     NUTRITION PRESCRIPTION:   Estimated Nutrition needs: --dosing wt of 65  kg - wt taken on 10/2  Calories: 5513-2310 calories/day (25-30 calories per kg Dosing wt)  Protein: 78-98 g protein/day (1.2-1.5  g protein/kg Dosing wt)  Fluid Needs: 1625 ml (1 ml/kcal). Adjust per clinical status (ESRD, oliguric)     - Diet:   No orders of the defined types were placed in this encounter.  - Medical Food Supplements-NPO.  - Vitamin and mineral supplements: PTA vitamin D  - Feeding assistance: NPO  - Nutrition education: assess education needs   - Coordination of nutrition care: collaboration with other providers and discussed in Care Rounds   - Discharge and transfer of nutrition care to new setting or provider: monitor plans    MONITOR AND EVALUATE/NUTRITION GOALS:  - Food and Nutrient Intake:      Monitor: for PO initiation once deemed safe per SLP.   - Food and Nutrient Administration:      Monitor: for need to resume temporary EN earlier   - Anthropometric Measurement:    Monitor weight  - Nutrition Goals:      diet initiation  vs nutrition support within 24 hrs, labs within acceptable limits, and euglycemia. Promote wound healing.     DIETITIAN FOLLOW UP: RD to follow    iTana Guevara RD, LDN, [facility] ([id number])

## 2024-10-08 LAB
ANION GAP SERPL CALC-SCNC: 12 MMOL/L (ref 0–18)
BUN BLD-MCNC: 61 MG/DL (ref 9–23)
BUN/CREAT SERPL: 10.4 (ref 10–20)
CALCIUM BLD-MCNC: 10.4 MG/DL (ref 8.7–10.4)
CHLORIDE SERPL-SCNC: 96 MMOL/L (ref 98–112)
CO2 SERPL-SCNC: 26 MMOL/L (ref 21–32)
CREAT BLD-MCNC: 5.86 MG/DL
DEPRECATED RDW RBC AUTO: 51.8 FL (ref 35.1–46.3)
EGFRCR SERPLBLD CKD-EPI 2021: 9 ML/MIN/1.73M2 (ref 60–?)
ERYTHROCYTE [DISTWIDTH] IN BLOOD BY AUTOMATED COUNT: 15.5 % (ref 11–15)
GLUCOSE BLD-MCNC: 176 MG/DL (ref 70–99)
GLUCOSE BLDC GLUCOMTR-MCNC: 157 MG/DL (ref 70–99)
GLUCOSE BLDC GLUCOMTR-MCNC: 219 MG/DL (ref 70–99)
GLUCOSE BLDC GLUCOMTR-MCNC: 226 MG/DL (ref 70–99)
GLUCOSE BLDC GLUCOMTR-MCNC: 232 MG/DL (ref 70–99)
HCT VFR BLD AUTO: 22.9 %
HGB BLD-MCNC: 7.7 G/DL
MCH RBC QN AUTO: 30.7 PG (ref 26–34)
MCHC RBC AUTO-ENTMCNC: 33.6 G/DL (ref 31–37)
MCV RBC AUTO: 91.2 FL
OSMOLALITY SERPL CALC.SUM OF ELEC: 300 MOSM/KG (ref 275–295)
PHOSPHATE SERPL-MCNC: 8.6 MG/DL (ref 2.4–5.1)
PLATELET # BLD AUTO: 296 10(3)UL (ref 150–450)
POTASSIUM SERPL-SCNC: 4.7 MMOL/L (ref 3.5–5.1)
RBC # BLD AUTO: 2.51 X10(6)UL
SODIUM SERPL-SCNC: 134 MMOL/L (ref 136–145)
WBC # BLD AUTO: 21.2 X10(3) UL (ref 4–11)

## 2024-10-08 PROCEDURE — 99233 SBSQ HOSP IP/OBS HIGH 50: CPT | Performed by: INTERNAL MEDICINE

## 2024-10-08 RX ORDER — CLOPIDOGREL BISULFATE 75 MG/1
75 TABLET ORAL DAILY
Status: DISCONTINUED | OUTPATIENT
Start: 2024-10-08 | End: 2024-10-19

## 2024-10-08 RX ORDER — ATORVASTATIN CALCIUM 40 MG/1
40 TABLET, FILM COATED ORAL NIGHTLY
Status: DISCONTINUED | OUTPATIENT
Start: 2024-10-08 | End: 2024-10-19

## 2024-10-08 RX ORDER — CARVEDILOL 25 MG/1
25 TABLET ORAL 2 TIMES DAILY
Status: DISCONTINUED | OUTPATIENT
Start: 2024-10-08 | End: 2024-10-19

## 2024-10-08 RX ORDER — SEVELAMER CARBONATE 800 MG/1
800 TABLET, FILM COATED ORAL
Status: DISCONTINUED | OUTPATIENT
Start: 2024-10-08 | End: 2024-10-22

## 2024-10-08 RX ORDER — ALBUMIN (HUMAN) 12.5 G/50ML
25 SOLUTION INTRAVENOUS
Status: DISCONTINUED | OUTPATIENT
Start: 2024-10-08 | End: 2024-10-10

## 2024-10-08 RX ORDER — ATORVASTATIN CALCIUM 40 MG/1
40 TABLET, FILM COATED ORAL NIGHTLY
Status: DISCONTINUED | OUTPATIENT
Start: 2024-10-09 | End: 2024-10-08

## 2024-10-08 NOTE — PROGRESS NOTES
[facility]  part of [facility]    Progress Note    Ollie Hernández Patient Status:  Inpatient    1945 MRN [id number]   Location [facility] 2W/SW Attending Lizbeth Rey MD   Hosp Day # 9 PCP Wili Parmar MD         Subjective:     Constitutional:         Seen with several family members.  Patient awake and alert.  This was the first time I was able to converse with him.  Nursing reported he had significant pain and declined therapy.  He stated the pain is not bad and he will work harder with the therapist.  He had dialysis today.  Hemoglobin 7.9.       Objective:   Blood pressure 150/62, pulse 79, temperature 98.1 °F (36.7 °C), temperature source Temporal, resp. rate 20, height 5' 4.02\" (1.626 m), weight 144 lb 10 oz (65.6 kg), SpO2 99%.  Physical Exam  Musculoskeletal:      Comments: Wound VAC dressing clean and dry.  Left leg with no swelling or hematoma.  I showed this to his family.  Nontender.  Dorsiflexes and plantar flexes both feet and toes but seems to have very slight weakness to left EHL compared to right.  Denies numbness or tingling to the anterior thigh, lateral thigh, leg, and feet.         Results:   Lab Results   Component Value Date    WBC 12.6 (H) 10/07/2024    HGB 7.9 (L) 10/07/2024    HCT 24.7 (L) 10/07/2024    .0 10/07/2024    CREATSERUM 6.84 (H) 10/07/2024    BUN 65 (H) 10/07/2024     (L) 10/07/2024    K 4.6 10/07/2024    CL 92 (L) 10/07/2024    CO2 22.0 10/07/2024     (H) 10/07/2024    CA 10.0 10/07/2024    ALB 3.6 10/07/2024    ALKPHO 63 10/01/2024    BILT 1.2 (H) 10/01/2024    TP 7.0 10/01/2024    AST 22 10/01/2024    ALT 9 (L) 10/01/2024    PTT 30.1 2024    INR 1.45 (H) 2024    T4F 0.9 2022    TSH 2.844 2024     (H) 2024    PSA 2.94 10/20/2021    DDIMER 6.07 (H) 2024    ESRML 10 2024    CRP 1.30 (H) 2024    MG 2.2 10/02/2024    PHOS 9.6 (HH) 10/07/2024    TROP <0.045  07/25/2019    TROPHS 56 (HH) 09/29/2024     08/05/2023    B12 672 07/04/2024       XR CHEST AP PORTABLE  (CPT=71045)    Result Date: 10/7/2024  CONCLUSION:   Interval extubation and removal of enteric tube.  Mildly improved right basilar opacity.    Dictated by (CST): Kevin Hensley MD on 10/07/2024 at 3:04 PM     Finalized by (CST): Kevin Hensley MD on 10/07/2024 at 3:05 PM               Assessment & Plan:     Closed displaced midcervical fracture of left femur with delayed healing  Postop day #3 left anterior hip replacement for displaced femoral neck fracture.  Doing pretty well but needs to be ambulated more now that he is extubated.  Will likely need a rehab stay when his medical conditions are more optimized.  I encouraged him and the family to push him with exercises in bed such as foot pumps, active straight leg raise, and hip flexion.      ESRD (end stage renal disease) (HCC)  Per renal      Closed fracture of left hip (HCC)  As above      Hyperphosphatemia  Per medicine and renal      Respiratory failure (HCC)  Per medicine and pulmonary      Anemia  Stable.              Humberto Murphy MD  10/7/2024

## 2024-10-08 NOTE — OCCUPATIONAL THERAPY NOTE
OCCUPATIONAL THERAPY EVALUATION - INPATIENT     Room Number: 208/208-A  Evaluation Date: 10/8/2024  Type of Evaluation: Initial  Presenting Problem: Closed L femur fracture; s/p L anterior replacement; WBAT, anterior hip precautions    Physician Order: IP Consult to Occupational Therapy  Reason for Therapy: ADL/IADL Dysfunction and Discharge Planning    OCCUPATIONAL THERAPY ASSESSMENT   Patient is a 79 year old male admitted 9/28/2024 for closed L femur fracture; s/p L hip replacement, anterior approach; WBAT.  Prior to admission, patient's baseline is assist with self care PRN; ambulatory with RW.  Patient is currently functioning below3 baseline with self care and functional mobility.  Patient is requiring up to Max A for mobility related ADLs; Mod-Ma xA for functional mobiliyt as a result of the following impairments: decreased functional strength, decreased functional reach, decreased endurance, pain, decreased muscular endurance, and medical status. Occupational Therapy will continue to follow for duration of hospitalization.    Patient will benefit from continued skilled OT Services to promote return to prior level of function and safety with continuous assistance and gradual rehabilitative therapy.    PLAN DURING HOSPITALIZATION  OT Device Recommendations: TBD  OT Treatment Plan: Balance activities;Energy conservation/work simplification techniques;ADL training;Functional transfer training;Endurance training;Patient/Family training;Patient/Family education;Compensatory technique education     OCCUPATIONAL THERAPY MEDICAL/SOCIAL HISTORY   Problem List   Principal Problem:    Closed displaced midcervical fracture of left femur with delayed healing  Active Problems:    ESRD (end stage renal disease) (HCC)    Closed fracture of left hip (HCC)    Hyperphosphatemia    Respiratory failure (HCC)    Anemia    HOME SITUATION  Type of Home: House  Home Layout: One level; Ramped entrance  Lives With: Family  Drives:  No  Patient Regularly Uses: Rolling walker    SUBJECTIVE  I need to stretch out.     OCCUPATIONAL THERAPY EXAMINATION   OBJECTIVE  Precautions: Limb alert - left; WENDY - anterior  Fall Risk: High fall risk    WEIGHT BEARING RESTRICTION  L Lower Extremity: Weight Bearing as Tolerated    PAIN ASSESSMENT  Rating: 3  Location: hip  Management Techniques: Activity promotion    ACTIVITY TOLERANCE  Pulse: 77        BP: (!) 139/98  BP Location: Left arm  BP Method: Automatic  Patient Position: Sitting    O2 SATURATIONS  Oxygen Therapy  SPO2% on Oxygen at Rest: 96  At rest oxygen flow (liters per minute): 2    COGNITION  Able to follow commands; delayed processing    RANGE OF MOTION   Upper extremity ROM is within functional limits     STRENGTH ASSESSMENT  Upper extremity strength is within functional limits     ACTIVITIES OF DAILY LIVING ASSESSMENT  AM-PAC ‘6-Clicks’ Inpatient Daily Activity Short Form  How much help from another person does the patient currently need…  -   Putting on and taking off regular lower body clothing?: A Lot  -   Bathing (including washing, rinsing, drying)?: A Lot  -   Toileting, which includes using toilet, bedpan or urinal? : A Lot  -   Putting on and taking off regular upper body clothing?: A Lot  -   Taking care of personal grooming such as brushing teeth?: A Little  -   Eating meals?: A Little    AM-PAC Score:  Score: 14  Approx Degree of Impairment: 59.67%  Standardized Score (AM-PAC Scale): 33.39  CMS Modifier (G-Code): CK    FUNCTIONAL TRANSFER ASSESSMENT  Sit to Stand: Edge of Bed  Edge of Bed: Moderate Assist (x2)    BED MOBILITY  Rolling: Moderate Assist  Supine to Sit : Moderate Assist  Scooting: Mod a    BALANCE ASSESSMENT  Static Sitting: Minimal Assist  Static Standing: Moderate Assist    FUNCTIONAL ADL ASSESSMENT  Eating: Supervision  Grooming Seated: Minimal Assist  Bathing Seated: Maximum Assist  UB Dressing Seated: Minimal Assist  LB Dressing Seated: Maximum Assist  Toileting  Seated: Maximum Assist    THERAPEUTIC EXERCISE     Skilled Therapy Provided: Patient educated on hip precautions and WB Status; increased time for processing; pt motivated and cooperative; spouse present; x2 assist for bed mobility; Min A for sitting balance; Max A to don socks; Mod A x2 for SPT to bedside; after resting, was able to stand >1 minute with Mod AX2 for STS and progressing to Mod A for static standing; pt able to demonstrate lateral weight shifting thorughout; returned to bedside chair and provided with illustrated handout for hip precuations; Continue skilled occupational therapy while IP to maximize patient function and increase patient participation, safety, and independence with basic ADL and everyday activities.       EDUCATION PROVIDED  Patient Education : Role of Occupational Therapy; Plan of Care; Discharge Recommendations; Functional Transfer Techniques; Surgical Precautions; Posture/Positioning; Proper Body Mechanics  Patient's Response to Education: Verbalized Understanding    The patient's Approx Degree of Impairment: 59.67% has been calculated based on documentation in the [facility] '6 clicks' Inpatient Daily Activity Short Form.  Research supports that patients with this level of impairment may benefit from GR.  Final disposition will be made by interdisciplinary medical team.    Patient End of Session: Up in chair;Call light within reach;Needs met;RN aware of session/findings;Alarm set;Family present    OT Goals  Patient self-stated goal is: to return home     Patient will complete LE dressing with SBA   Comment:     Patient will complete toilet transfer with SBA   Comment:     Patient will complete self care task at sink level with SBA    Comment:     Comment:         Goals  on:   Frequency:3-5x week    Patient Evaluation Complexity Level:   Occupational Profile/Medical History MODERATE - Expanded review of history including review of medical or therapy record   Specific  performance deficits impacting engagement in ADL/IADL MODERATE  3 - 5 performance deficits   Client Assessment/Performance Deficits MODERATE - Comorbidities and min to mod modifications of tasks    Clinical Decision Making MODERATE - Analysis of occupational profile, detailed assessments, several treatment options    Overall Complexity MODERATE     OT Session Time: 23 minutes  Self-Care Home Management: 0 minutes  Therapeutic Activity: 23 minutes    Cesar Irving, Occupational Therapist, OTR/L ext [phone]

## 2024-10-08 NOTE — PLAN OF CARE
Patient A&O x3. On 2L NC, saturating well. All scheduled meds given per MAR. Diet initiated per SLP recommendations.  Blood glucose monitored. Left hip dressing w/ hemovac: dry, clean and intact. Patient up to chair w/ PT/OT, back to bed via lift.  Spouse at bedside updated on POC. 1 BM this shift, incontinence care provided as needed. Anuric. Patient turned and repositioned 2hrs and as needed. HD tomorrow, [facility] aware.  All safety precautions maintained.   Received orders to transfer patient to 415. Report given to ROSALINO Foote . Patient and family updated on transfer planning, all in agreement.  All pt's bedside belonging sent w/ pt. Tele box on prior to transfer and central tele notified. Double RN skin check done prior to transfer off unit. Skin check performed by this RN and Estella PCT    Wound are as follows: left hip sx site     Will remain available for any further questions or concerns.    Problem: Patient Centered Care  Goal: Patient preferences are identified and integrated in the patient's plan of care  Description: Interventions:  - What would you like us to know as we care for you? From home   - Provide timely, complete, and accurate information to patient/family  - Incorporate patient and family knowledge, values, beliefs, and cultural backgrounds into the planning and delivery of care  - Encourage patient/family to participate in care and decision-making at the level they choose  - Honor patient and family perspectives and choices  Outcome: Progressing     Problem: RESPIRATORY - ADULT  Goal: Achieves optimal ventilation and oxygenation  Description: INTERVENTIONS:  - Assess for changes in respiratory status  - Assess for changes in mentation and behavior  - Position to facilitate oxygenation and minimize respiratory effort  - Oxygen supplementation based on oxygen saturation or ABGs  - Provide Smoking Cessation handout, if applicable  - Encourage broncho-pulmonary hygiene including cough, deep  breathe, Incentive Spirometry  - Assess the need for suctioning and perform as needed  - Assess and instruct to report SOB or any respiratory difficulty  - Respiratory Therapy support as indicated  - Manage/alleviate anxiety  - Monitor for signs/symptoms of CO2 retention  Outcome: Progressing     Problem: CARDIOVASCULAR - ADULT  Goal: Maintains optimal cardiac output and hemodynamic stability  Description: INTERVENTIONS:  - Monitor vital signs, rhythm, and trends  - Monitor for bleeding, hypotension and signs of decreased cardiac output  - Evaluate effectiveness of vasoactive medications to optimize hemodynamic stability  - Monitor arterial and/or venous puncture sites for bleeding and/or hematoma  - Assess quality of pulses, skin color and temperature  - Assess for signs of decreased coronary artery perfusion - ex. Angina  - Evaluate fluid balance, assess for edema, trend weights  Outcome: Progressing

## 2024-10-08 NOTE — PROGRESS NOTES
Critical Care Progress Note     Assessment / Plan:  Acute respiratory failure - due to pulmonary edema and/or aspiration. Intubated 9/29. CXR with interval improvement  - extubated 10/6  - s/p Zosyn x 5 days  - no stridor on exam this morning, discontinued steroids  - volume management with HD  ESRD - hyponatremic on labs today  - HD per nephrology  KRAIG  - on CPAP at home, resumed here  Asthma  - Advair in lieu of Symbicort  Coffee ground output from OGT - resolved, hgb uptrending  - IV Protonix BID  CV: h/o pAFib  - S/p watchman 9/2024.   - Per cardiology.   Anemia - due to above  - transfuse to keep hgb >7  Hip fracture after fall  - s/p L FRANCE 10/4  - ok for PT from a pulm perspective  - per ortho.   DM  - SSI  FEN  - ADAT  Ppx  - subcu heparin  - PPI  Dispo  - Step-down, will follow peripherally, please call with questions or change in status    Subjective:  No acute events, no new complaints. Pain is well controlled    Objective:  Vitals:    10/08/24 0050 10/08/24 0100 10/08/24 0447 10/08/24 0519   BP:  157/72     BP Location:       Pulse: 95 81 84 90   Resp:  26  20   Temp:       TempSrc:       SpO2: 100% 99% 99% 97%   Weight:       Height:         Physical Exam:  General - laying in bed on HD  Respiratory - clear bilaterally, no upper airway sounds  Cardiovascular - regular rate and rhythm  Abdo - soft, NTND  Ext - no LE edema, Left hip dressing not taken down  Mental status - interactive, answering questions appropriately. Confused initially but easily oriented    Medications:  Reviewed in EMR    Lab Data:  Reviewed in EMR    Imaging:  I independently visualized all relevant chest imaging in PACS and agree with radiology interpretation except where noted.

## 2024-10-08 NOTE — CM/SW NOTE
Per chart, Anticipated therapy need: Gradual Rehabilitative Therapy    Referrals sent for [facility] w/ onsite HD via [facility]. [facility] request sent.  MANDY Leigh completed PASRR and uploaded to [facility].    SW/CM to f/up when [facility] list is generated for review/discussion. Will need to upload HD flowsheets for onsite approval.    PLAN: [facility] w/ onsite HD - pending list/choice, ins auth, HD Flowsheets uploaded, onsite HD approval at [facility], & med clear      SW/CM to remain available for support and/or discharge planning.       MS LucinaW, LSW [id number]

## 2024-10-08 NOTE — PROGRESS NOTES
[facility]  part of [facility]    Progress Note    Ollie Hernández Patient Status:  Inpatient    1945 MRN [id number]   Location [facility] 2W/SW Attending Pranay Michel MD   Hosp Day # 10 PCP Wili Parmar MD     Chief Complaint:   Chief Complaint   Patient presents with    Fall       Subjective:   Ollie Hernández   Status post left hip arthroplasty on 10/4/2024.      Patient successfully extubated 10/6/2024.  Currently on 2 L nasal cannula   Failed swallow evaluation yesterday repeating today hopefully can advance diet and start p.o. meds.      Pain seems well-controlled so far on IV narcotics will attempt to transition to p.o. once able to swallow         No other acute complaints or other nursing concerns or overnight events    Objective:   Objective:    Blood pressure 150/83, pulse 97, temperature 96.8 °F (36 °C), temperature source Temporal, resp. rate 22, height 5' 4.02\" (1.626 m), weight 144 lb 10 oz (65.6 kg), SpO2 98%.    Physical Exam:    General: Alert comfortable in no acute distress on 2 L nasal cannula  HEENT: Normocephalic, anicteric sclera   Neck: supple  Cardiac: Regular rate and rhythm. S1, S2 normal. No murmurs thrills or rubs  Lungs: Coarse breath sounds bilat  Abdomen: Soft, non-distended  Extremities: Without clubbing, cyanosis or edema.   Skin: Warm and dry.       Results:   Results:    Labs:  Recent Labs   Lab 10/02/24  0450 10/03/24  0421 10/04/24  0307 10/04/24  1411 10/05/24  0338 10/07/24  0407   WBC 8.1 8.5 8.3  --   --  12.6*   HGB 9.3* 10.3* 9.9* 9.1* 7.8* 7.9*   MCV 89.9 90.1 92.4  --   --  92.2   .0 176.0 182.0  --   --  222.0       Recent Labs   Lab 10/04/24  0307 10/05/24  0338 10/07/24  0409   * 189* 311*   BUN 70* 39* 65*   CREATSERUM 6.05* 4.01* 6.84*   CA 10.1 8.7 10.0   ALB  --   --  3.6    135* 126*   K 3.4* 3.3* 4.6   CL 99 99 92*   CO2 25.0 26.0 22.0       Estimated Creatinine Clearance: 7.6 mL/min (A) (based on  SCr of 6.84 mg/dL (H)).    No results for input(s): \"PTP\", \"INR\" in the last 168 hours.         Culture:  Hospital Encounter on 09/28/24   1. Blood Culture     Status: None    Collection Time: 09/30/24 10:03 AM    Specimen: Bld,Arterial Line; Blood   Result Value Ref Range    Blood Culture Result No Growth 5 Days N/A       Cardiac  No results for input(s): \"TROP\", \"PBNP\" in the last 168 hours.      Imaging: Imaging data reviewed in [facility].  XR CHEST AP PORTABLE  (CPT=71045)    Result Date: 10/7/2024  CONCLUSION:   Interval extubation and removal of enteric tube.  Mildly improved right basilar opacity.    Dictated by (CST): Kevin Hensley MD on 10/07/2024 at 3:04 PM     Finalized by (CST): Kevin Hensley MD on 10/07/2024 at 3:05 PM           Medications:    methylPREDNISolone  40 mg Intravenous Q8H    epoetin donna  5,000 Units Subcutaneous Once per day on Monday Wednesday Friday    fluconazole  100 mg Oral Nightly    heparin  5,000 Units Subcutaneous Q8H STEPHANIE    senna  10 mL Oral BID    docusate  100 mg Oral BID    mineral oil-white petrolatum  1 Application Both Eyes Nightly    lidocaine-menthol  2 patch Transdermal Daily    pantoprazole  40 mg Intravenous Q12H    cetirizine  5 mg Per NG Tube Daily    carvedilol  25 mg Per NG Tube BID    atorvastatin  40 mg Per NG Tube Nightly    insulin aspart  1-7 Units Subcutaneous q6h    fluticasone-salmeterol  1 puff Inhalation BID         Assessment and Plan:   Assessment & Plan:      Acute hypoxic respiratory failure   Hypotension   Aspiration PNA  Pulmonary and cardiology on consult.   Secondary to pulmonary edema and aspiration  Intubated 9/29- extubated on 10/6/2024  Empiric zosyn.  Completed 5 days  Sputum cx normal julio c. Blood cx x 4 NGTD  Volume management per nephrology   CXR pulmonary edema. Improved alveolar opacities.   10/6: extubated  10/8: now on 2L O2NC, repeat CXR, SLP, PT , OT        ESRD   HD per nephrology MWF  Nephro on consult.   10/7: s/p HD          Hip fracture   S/p fall PTA -status post left hip arthroplasty on 10/4/2024  Orthopedic surgery on consult.   High risk for surgery  however surgery is necessary.   IV dilaudid panel, transition to po narcotics once passes swallow evaluation    Leukocytosis: Possibly reactive?  Has been uptrending in the last 48 hours, no clear evidence of new infectious source.  No fever  Chest x-ray from 10/7/2024:  Mildly improved right basilar opacity.   Hold off further antibiotics at this time just received Zosyn for 5 days.  Continue to monitor clinically and trend leukocytosis daily.      DM II   A1c   ISS     Paroxysmal A.fib - currently NSR   Acute on chronic HFpEF  CAD s/p jodi circ 5/24'   Elevate troponin secondary to demand ischemia   S/p watchman device 9/11/24  Cont coreg, statin  No further cardiac testing prior to planned surgery.   Resume ASA/Plavix once safely able to do so.     Coffee ground emesis   Resolved.   IV protonix BID   Hemoglobin downtrending postoperatively from 9.1-7.7.  Continue to monitor closely       >55min spent, >50% spent counseling and coordinating care in the form of educating pt/family and d/w consultants and staff. Most of the time spent discussing the above plan.        Plan of care discussed with patient or family at bedside.    Lizbeth Rey MD            Supplementary Documentation:     Quality:  DVT Prophylaxis: heparin   CODE status: Full  Dispo: per clinical course            Estimated date of discharge: TBD  Discharge is dependent on: clinical stability  At this point Mr. Hernández is expected to be discharge to: TBD

## 2024-10-08 NOTE — PHYSICAL THERAPY NOTE
PHYSICAL THERAPY EVALUATION - INPATIENT     Room Number: 208/208-A  Evaluation Date: 10/8/2024  Type of Evaluation: Initial   Physician Order: PT Eval and Treat    Presenting Problem: fall, s/p L WENDY  Co-Morbidities : Anemia    Asthma (HCC)   Back problem   BPH (benign prostatic hyperplasia)   Calculus of kidney   Cataract   Coronary atherosclerosis   Diabetes (HCC)   ESRD (end stage renal disease) on dialysis (HCC)   Essential hypertension   High blood pressure   High cholesterol   History of blood transfusion   Hyperlipidemia   Neuropathy    hands and feet   KRAIG on CPAP   Renal disorder   Sleep apnea   Visual impairment    glasses   Vocal cord paralysis, unilateral partial  Reason for Therapy: Mobility Dysfunction and Discharge Planning    PHYSICAL THERAPY ASSESSMENT   Patient is a 79 year old male admitted 9/28/2024 for fall, L WENDY.  Prior to admission, patient's baseline is ambulatory with a rolling walker.  Patient is currently functioning below baseline with bed mobility, transfers, gait, stair negotiation, maintaining seated position, standing prolonged periods, and performing household tasks.  Patient is requiring dependent as a result of the following impairments: decreased functional strength, decreased endurance/aerobic capacity, pain, impaired sitting balance, and medical status.  Physical Therapy will continue to follow for duration of hospitalization.    Patient will benefit from continued skilled PT Services to promote return to prior level of function and safety with continuous assistance and gradual rehabilitative therapy .    PLAN DURING HOSPITALIZATION  Nursing Mobility Recommendation : Lift Equipment     PT Treatment Plan: Bed mobility;Body mechanics;Coordination;Endurance;Gait training;Strengthening;Stair training;Transfer training;Balance training  Rehab Potential : Fair  Frequency (Obs): 5x/week     PHYSICAL THERAPY MEDICAL/SOCIAL HISTORY   History related to current admission: Patient is a  79-year-old male with past medical history of multiple comorbid conditions but come to the hospital.  Suffering a minimally displaced left femoral neck fracture.  Patient seen examined emergency department currently states that the pain is uncontrolled, orthopedic surgery has been placed on consult plan to get a CT scan of the hip.  Plan is for surgery for tomorrow.      Problem List  Principal Problem:    Closed displaced midcervical fracture of left femur with delayed healing  Active Problems:    ESRD (end stage renal disease) ([facility])    Closed fracture of left hip ([facility])    Hyperphosphatemia    Respiratory failure ([facility])    Anemia      HOME SITUATION  Type of Home: House  Home Layout: One level;Ramped entrance  Stairs to Enter : 4   Lives With: Family  (spouse would assist with LE dressing)  Drives: No   Patient Regularly Uses: Cane;Rolling walker  (was receiving home therapy prior to admission) Patient able to ambulate in the community and perform errands (would walk to bank/grocery store near home)     Prior Level of [place]: Prior to admission patient was modified independent with performance of all ADL's and performance of functional mobility with a rolling walker. Wife would assist with LE dressing.    SUBJECTIVE  \"I gotta loosen it up\" (regarding L hip)    PHYSICAL THERAPY EXAMINATION   OBJECTIVE  Precautions: Limb alert - left;WENDY - anterior  Fall Risk: High fall risk    WEIGHT BEARING RESTRICTION  L Lower Extremity: Weight Bearing as Tolerated      PAIN ASSESSMENT  Rating: 3  Location: L hip  Management Techniques: Activity promotion;Body mechanics;Breathing techniques;Repositioning;Relaxation    COGNITION  Overall Cognitive Status:  WFL - within functional limits  Arousal/Alertness:  appropriate responses to stimuli  Safety Judgement:  decreased awareness of need for assistance and decreased awareness of need for safety    RANGE OF MOTION AND STRENGTH ASSESSMENT  Upper extremity ROM and strength are  within functional limits BUE.  Lower extremity ROM is within functional limits BLE passively.  Lower extremity strength is within functional limits. RLE grossly 3+/5, LLE grossly 3-/5.    BALANCE  Static Sitting: Fair  Dynamic Sitting: Poor  Static Standing: Dependent  Dynamic Standing: Dependent    ACTIVITY TOLERANCE  Pulse: 80  Heart Rate Source: Monitor     BP: (!) 169/78  BP Location: Left arm  BP Method: Automatic  Patient Position: Sitting    O2 WALK  Oxygen Therapy  SPO2% on Oxygen at Rest: 94  At rest oxygen flow (liters per minute): 2    AM-PAC '6-Clicks' INPATIENT SHORT FORM - BASIC MOBILITY  How much difficulty does the patient currently have...  Patient Difficulty: Turning over in bed (including adjusting bedclothes, sheets and blankets)?: Unable   Patient Difficulty: Sitting down on and standing up from a chair with arms (e.g., wheelchair, bedside commode, etc.): Unable   Patient Difficulty: Moving from lying on back to sitting on the side of the bed?: Unable   How much help from another person does the patient currently need...   Help from Another: Moving to and from a bed to a chair (including a wheelchair)?: Total   Help from Another: Need to walk in hospital room?: Total   Help from Another: Climbing 3-5 steps with a railing?: Total     AM-PAC Score:  Raw Score: 6   Approx Degree of Impairment: 100%   Standardized Score (AM-PAC Scale): 23.55   CMS Modifier (G-Code): CN    FUNCTIONAL ABILITY STATUS  Functional Mobility/Gait Assessment  Gait Assistance: Not tested  Rolling: maximum assist  Supine to Sit: dependent (maximal assistance x 2)  Sit to Supine:  not tested  Sit to Stand: dependent (maximal assistance x 2)    Exercise/Education Provided:  Education Provided To: Patient;Family/Caregiver Patient Education: Role of Physical Therapy;Plan of Care;Discharge Recommendations;DME Recommendations;Functional Transfer Techniques;Fall Prevention;Surgical Precautions;Posture/Positioning;Weight Bear  Status;Energy Conservation;Gait Training Patient's Response to Education: Verbalized Understanding;Requires Further Education     Skilled Therapy Provided: Patient received supine in bed at initiation of session agreeable to participation in PT. Patient tolerates treatment fairly, requires maximal assistance x 2 to perform supine to sit transfer. Tolerates edge of bed sitting for ~5 minutes, fluctuating between contact guard and standby assistance to remain upright. Patient requires maximal assistance x 2 to perform bed to chair transfer. Performed sit to stand transfer from recliner chair. Tolerates standing for ~1-2 minutes, performed standing weight shifting activities. Patient left in bedside recliner. lines intact, needs in reach and handoff to RN.      The patient's Approx Degree of Impairment: 100% has been calculated based on documentation in the [facility] '6 clicks' Inpatient Basic Mobility Short Form.  Research supports that patients with this level of impairment may benefit from LTAC, however given patient's previous level of function will recommend subacute rehab.    Final disposition will be made by interdisciplinary medical team.    Patient End of Session: Up in chair;Needs met;Call light within reach;RN aware of session/findings;All patient questions and concerns addressed;Hospital anti-slip socks;Alarm set;Family present    CURRENT GOALS  Goals to be met by: 10/22/24  Patient Goal Patient's self-stated goal is: to return to PLOF   Goal #1 Patient is able to demonstrate supine - sit EOB @ level: moderate assistance     Goal #1   Current Status    Goal #2 Patient is able to demonstrate transfers EOB to/from Chair/Wheelchair at assistance level: moderate assistance with  least restrictive device     Goal #2  Current Status    Goal #3 Patient is able to ambulate >10 feet with assist device:  least restrictive device  at assistance level: moderate assistance   Goal #3   Current Status    Goal #4    Goal #4    Current Status    Goal #5 Patient to demonstrate independence with home activity/exercise instructions provided to patient in preparation for discharge.   Goal #5   Current Status    Goal #6    Goal #6  Current Status      Patient Evaluation Complexity Level:  History High - 3 or more personal factors and/or co-morbidities   Examination of body systems Moderate - addressing a total of 3 or more elements   Clinical Presentation  Moderate - Evolving   Clinical Decision Making  Moderate Complexity     Therapeutic Activity:  15 minutes    Amaris Martinez, PT, DPT

## 2024-10-08 NOTE — PLAN OF CARE
Patient is transferred from PCCU. Alert and oriented times 3. Daughter at bedside. Vitals rima monitored. Sugar being monitored. Left hip dressing clean dry and intact. Pureed diet and nectar thick liquid. HAS AV fistula right arm. Right arm precautions. Patient uses call light appropriately. Call light with in reach to the patient.   Problem: PAIN - ADULT  Goal: Verbalizes/displays adequate comfort level or patient's stated pain goal  Description: INTERVENTIONS:  - Encourage pt to monitor pain and request assistance  - Assess pain using appropriate pain scale  - Administer analgesics based on type and severity of pain and evaluate response  - Implement non-pharmacological measures as appropriate and evaluate response  - Consider cultural and social influences on pain and pain management  - Manage/alleviate anxiety  - Utilize distraction and/or relaxation techniques  - Monitor for opioid side effects  - Notify MD/LIP if interventions unsuccessful or patient reports new pain  - Anticipate increased pain with activity and pre-medicate as appropriate  Outcome: Progressing     Problem: SAFETY ADULT - FALL  Goal: Free from fall injury  Description: INTERVENTIONS:  - Assess pt frequently for physical needs  - Identify cognitive and physical deficits and behaviors that affect risk of falls.  - [facility] fall precautions as indicated by assessment.  - Educate pt/family on patient safety including physical limitations  - Instruct pt to call for assistance with activity based on assessment  - Modify environment to reduce risk of injury  - Provide assistive devices as appropriate  - Consider OT/PT consult to assist with strengthening/mobility  - Encourage toileting schedule  Outcome: Progressing     Problem: DISCHARGE PLANNING  Goal: Discharge to home or other facility with appropriate resources  Description: INTERVENTIONS:  - Identify barriers to discharge w/pt and caregiver  - Include patient/family/discharge partner in  discharge planning  - Arrange for needed discharge resources and transportation as appropriate  - Identify discharge learning needs (meds, wound care, etc)  - Arrange for interpreters to assist at discharge as needed  - Consider post-discharge preferences of patient/family/discharge partner  - Complete POLST form as appropriate  - Assess patient's ability to be responsible for managing their own health  - Refer to Case Management Department for coordinating discharge planning if the patient needs post-hospital services based on physician/LIP order or complex needs related to functional status, cognitive ability or social support system  Outcome: Progressing

## 2024-10-08 NOTE — SLP NOTE
ADULT SWALLOWING RE EVALUATION    ASSESSMENT    ASSESSMENT/OVERALL IMPRESSION:  PPE REQUIRED. THIS THERAPIST WORE GLOVES FOR DURATION OF EVALUATION. HANDS WASHED UPON ENTRANCE/EXIT.    A swallow re evaluation warranted as pt remains NPO. Pt afebrile with weak vocal quality, on 2L/Min, with oxygen saturation at 98%. Pt with hx of dysphagia at [facility], last seen 6/18/24 with recommendations for regular/thin liquids.   Pt positioned 90 degrees in bedside chair, alert/cooperative/spouse present. Pt with no complaints of pain. Oral motor skills within functional limits. Pt presented with trials of hard solids, puree, and mildly thick liquids via tsp. Pt with adequate oral acceptance and bilabial seal across all trials. Pt with intact bite, prolonged mastication of solids, and delayed A-P transit. Pt's swallow response appears delayed with reduced hyolaryngeal elevation/excursion. 1x delayed cough observed after hard solid trial. No other clinical signs of aspiration (e.g., immediate/delayed throat clear, immediate cough, wet vocal quality, increased O2 effort) observed across all trials. 10/7 CXR indicates \"Interval extubation and removal of enteric tube.  Mildly improved right basilar opacity\". Oxygen status remained stable t/o the entire evaluation.     At this time, pt presents with mild oral dysphagia and probable pharyngeal dysfunction. Recommend a puree diet and mildly thick liquids with strict adherence to safe swallowing compensatory strategies. Results and recommendations reviewed with RN, pt, and family. Pt/pt's family v/u to all results/recommendations. Recommendations remain written on whiteboard. SLP collaborated with RN for MD diet orders.     PLAN: SLP to f/u x2-3 meal assessments, monitor imaging, and VFSS if clinically indicated       RECOMMENDATIONS   Diet Recommendations - Solids: Puree  Diet Recommendations - Liquids: Nectar thick liquids/ Mildly thick                    Compensatory Strategies  Recommended: No straws;Slow rate;Small bites and sips  Aspiration Precautions: Upright position;Slow rate;Small bites and sips;No straw  Medication Administration Recommendations: Crushed in puree  Treatment Plan/Recommendations: Aspiration precautions    HISTORY   MEDICAL HISTORY  Reason for Referral:  (s/p extubation)    Problem List  Principal Problem:    Closed displaced midcervical fracture of left femur with delayed healing  Active Problems:    ESRD (end stage renal disease) ([facility])    Closed fracture of left hip ([facility])    Hyperphosphatemia    Respiratory failure (HCC)    Anemia      Past Medical History  Past Medical History:    Anemia    Asthma ([facility])    Back problem    BPH (benign prostatic hyperplasia)    Calculus of kidney    Cataract    Coronary atherosclerosis    Diabetes (HCC)    ESRD (end stage renal disease) on dialysis (HCC)    Essential hypertension    High blood pressure    High cholesterol    History of blood transfusion    Hyperlipidemia    Neuropathy    hands and feet    KRAIG on CPAP    Renal disorder    Sleep apnea    Visual impairment    glasses    Vocal cord paralysis, unilateral partial       Prior Living Situation: Home with spouse  Diet Prior to Admission: Regular;Thin liquids  Precautions: Aspiration    Patient/Family Goals: did not state    SWALLOWING HISTORY  Current Diet Consistency: NPO  Dysphagia History: see above  Imaging Results: 9/28 CT BRAIN  CONCLUSION:   1. No acute intracranial hemorrhage, midline shift, mass effect, or hydrocephalus.   2. No transcortical area of hypoattenuation to suggest an acute infarct.  If there is clinical concern for an acute infarct, consider further evaluation with an MRI of the brain.   3. Moderate chronic microvascular ischemic changes with a small chronic left thalamus infarct.   4. No displaced calvarial fracture.   5. Lesser incidental findings described above.     9/30 CT CHEST  CONCLUSION:   1. No evidence of acute pulmonary embolism to the level  of the proximal segmental pulmonary artery branches.   2. Bilateral pleural effusions and associated basilar atelectasis, with or without superimposed pneumonia.   3. Mild pulmonary interstitial edema is observed.   4. Dilatation of the main pulmonary artery trunk may relate to underlying pulmonary hypertension.   5. Mild ascending thoracic aortic ectasia.   6. Mild centrilobular emphysema.   7. Additional support tubes and lines as above.   8. A 1.9 cm right thyroid lobe nodule is present. If not previously worked up, follow-up nonemergent thyroid sonography can be considered for further assessment.   9. Lesser incidental findings as above.       SUBJECTIVE       OBJECTIVE   ORAL MOTOR EXAMINATION  Dentition: Functional  Symmetry: Within Functional Limits  Strength: Within Functional Limits  Tone: Within Functional Limits  Range of Motion: Within Functional Limits  Rate of Motion: Within Functional Limits    Voice Quality: Weak  Respiratory Status: Supplemental O2;Nasal cannula  Consistencies Trialed: Nectar thick liquids;Puree;Hard solid  Method of Presentation: Staff/Clinician assistance;Spoon  Patient Positioning: Upright;Midline    Oral Phase of Swallow: Impaired  Bolus Retrieval: Intact  Bilabial Seal: Intact  Bolus Formation: Impaired  Bolus Propulsion: Impaired  Mastication: Impaired  Retention: Impaired    Pharyngeal Phase of Swallow: Impaired  Laryngeal Elevation Timing: Appears impaired  Laryngeal Elevation Strength: Appears impaired  Laryngeal Elevation Coordination: Appears intact  (Please note: Silent aspiration cannot be evaluated clinically. Videofluoroscopic Swallow Study is required to rule-out silent aspiration.)    Esophageal Phase of Swallow: No complaints consistent with possible esophageal involvement  Comments: NA          GOALS  Goal #1 The patient will tolerate puree consistency and mildly thick liquids without overt signs or symptoms of aspiration with 100 % accuracy over 1-2 session(s).   In Progress   Goal #2 The patient/family/caregiver will demonstrate understanding and implementation of aspiration precautions and swallow strategies independently over 1-2 session(s).    In Progress   Goal #3 The patient will tolerate trial upgrade of soft/hard solid consistency and thin liquids without overt signs or symptoms of aspiration with 100 % accuracy over 1-2 session(s).  In Progress   Goal #4 The patient will utilize compensatory strategies as outlined by  BSSE (clinical evaluation) including Slow rate, Small bites, Small sips, No straws, Upright 90 degrees, Eliminate distractions with PRN assistance 100 % of the time across 2 sessions.    In Progress     FOLLOW UP  Treatment Plan/Recommendations: Aspiration precautions  Number of Visits to Meet Established Goals: 3  Follow Up Needed (Documentation Required): Yes  SLP Follow-up Date: 10/09/24    Thank you for your referral.   If you have any questions, please contact ARTEMIO Sawant M.S. CCC-SLP  Speech Language Pathologist  Phone Number [phone]

## 2024-10-08 NOTE — PLAN OF CARE
Pt. AxO x3, disoriented to time and following commands. Becoming confused , trying to remove BiPAP and disoriented to place. Refusing pain meds. Bed in lowest position and call light within reach.   Problem: Patient Centered Care  Goal: Patient preferences are identified and integrated in the patient's plan of care  Description: Interventions:  - Provide timely, complete, and accurate information to patient/family  - Incorporate patient and family knowledge, values, beliefs, and cultural backgrounds into the planning and delivery of care  - Encourage patient/family to participate in care and decision-making at the level they choose  - Honor patient and family perspectives and choices  Outcome: Progressing     Problem: Diabetes/Glucose Control  Goal: Glucose maintained within prescribed range  Description: INTERVENTIONS:  - Monitor Blood Glucose as ordered  - Assess for signs and symptoms of hyperglycemia and hypoglycemia  - Administer ordered medications to maintain glucose within target range  - Assess barriers to adequate nutritional intake and initiate nutrition consult as needed  - Instruct patient on self management of diabetes  Outcome: Progressing     Problem: Patient/Family Goals  Goal: Patient/Family Long Term Goal    Interventions:  - See additional Care Plan goals for specific interventions  Outcome: Progressing  Goal: Patient/Family Short Term Goal    Interventions:   - See additional Care Plan goals for specific interventions  Outcome: Progressing     Problem: PAIN - ADULT  Goal: Verbalizes/displays adequate comfort level or patient's stated pain goal  Description: INTERVENTIONS:  - Encourage pt to monitor pain and request assistance  - Assess pain using appropriate pain scale  - Administer analgesics based on type and severity of pain and evaluate response  - Implement non-pharmacological measures as appropriate and evaluate response  - Consider cultural and social influences on pain and pain  management  - Manage/alleviate anxiety  - Utilize distraction and/or relaxation techniques  - Monitor for opioid side effects  - Notify MD/LIP if interventions unsuccessful or patient reports new pain  - Anticipate increased pain with activity and pre-medicate as appropriate  Outcome: Progressing     Problem: SAFETY ADULT - FALL  Goal: Free from fall injury  Description: INTERVENTIONS:  - Assess pt frequently for physical needs  - Identify cognitive and physical deficits and behaviors that affect risk of falls.  - [place] fall precautions as indicated by assessment.  - Educate pt/family on patient safety including physical limitations  - Instruct pt to call for assistance with activity based on assessment  - Modify environment to reduce risk of injury  - Provide assistive devices as appropriate  - Consider OT/PT consult to assist with strengthening/mobility  - Encourage toileting schedule  Outcome: Progressing     Problem: DISCHARGE PLANNING  Goal: Discharge to home or other facility with appropriate resources  Description: INTERVENTIONS:  - Identify barriers to discharge w/pt and caregiver  - Include patient/family/discharge partner in discharge planning  - Arrange for needed discharge resources and transportation as appropriate  - Identify discharge learning needs (meds, wound care, etc)  - Arrange for interpreters to assist at discharge as needed  - Consider post-discharge preferences of patient/family/discharge partner  - Complete POLST form as appropriate  - Assess patient's ability to be responsible for managing their own health  - Refer to Case Management Department for coordinating discharge planning if the patient needs post-hospital services based on physician/LIP order or complex needs related to functional status, cognitive ability or social support system  Outcome: Progressing     Problem: CARDIOVASCULAR - ADULT  Goal: Maintains optimal cardiac output and hemodynamic stability  Description:  INTERVENTIONS:  - Monitor vital signs, rhythm, and trends  - Monitor for bleeding, hypotension and signs of decreased cardiac output  - Evaluate effectiveness of vasoactive medications to optimize hemodynamic stability  - Monitor arterial and/or venous puncture sites for bleeding and/or hematoma  - Assess quality of pulses, skin color and temperature  - Assess for signs of decreased coronary artery perfusion - ex. Angina  - Evaluate fluid balance, assess for edema, trend weights  Outcome: Progressing  Goal: Absence of cardiac arrhythmias or at baseline  Description: INTERVENTIONS:  - Continuous cardiac monitoring, monitor vital signs, obtain 12 lead EKG if indicated  - Evaluate effectiveness of antiarrhythmic and heart rate control medications as ordered  - Initiate emergency measures for life threatening arrhythmias  - Monitor electrolytes and administer replacement therapy as ordered  Outcome: Progressing     Problem: RESPIRATORY - ADULT  Goal: Achieves optimal ventilation and oxygenation  Description: INTERVENTIONS:  - Assess for changes in respiratory status  - Assess for changes in mentation and behavior  - Position to facilitate oxygenation and minimize respiratory effort  - Oxygen supplementation based on oxygen saturation or ABGs  - Provide Smoking Cessation handout, if applicable  - Encourage broncho-pulmonary hygiene including cough, deep breathe, Incentive Spirometry  - Assess the need for suctioning and perform as needed  - Assess and instruct to report SOB or any respiratory difficulty  - Respiratory Therapy support as indicated  - Manage/alleviate anxiety  - Monitor for signs/symptoms of CO2 retention  Outcome: Progressing     Problem: GASTROINTESTINAL - ADULT  Goal: Maintains or returns to baseline bowel function  Description: INTERVENTIONS:  - Assess bowel function  - Maintain adequate hydration with IV or PO as ordered and tolerated  - Evaluate effectiveness of GI medications  - Encourage  mobilization and activity  - Obtain nutritional consult as needed  - Establish a toileting routine/schedule  - Consider collaborating with pharmacy to review patient's medication profile  Outcome: Progressing

## 2024-10-08 NOTE — PROGRESS NOTES
[facility]      [facility] Cardiology Progress Note    Ollie Hernández Patient Status:  Inpatient    1945 MRN [id number]   Location [facility] 2W/SW Attending Pranay Michel MD   Hosp Day # 10 PCP Wili Parmar MD       Impression/Plan:  79 year old male presenting with:    Impression:  Hip fracture Left - S/P L anterior total hip arthroplasty 10/4/24  Acute resp failure, aspiration and/or pulmonary edema; intubated 2024 extubated 10/6  -Failed weaning trial yesterday  ESRD on HD  DM  PAF, currently SR. S/p watchman device 24  HTN  HL, on statin PTA  Acute on chronic diastolic CHF  CAD s/p [place] circ 24   Coffee grounds in OG tube  -Slight decline in Hb post-op  Elevated troponin, likely demand ischemia    Plan:  - Cont carvedilol for rate control monitor for hypotension   - Cont statin  - Asa/plavix on hold (recent Watchman implant 2024); Recheck Hb in Am - resume once safely able and taking PO   - HD as per nephrology  - Vent management as per pulm.    - Monitor on tele  - Reviewed w/ family at bedside AM labs pending      Subjective:     Breathing OK . Throat sore.  failed swallow eval? 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)    Atherosclerosis of native arteries of extremities with intermittent claudication, bilateral legs (HCC)    Primary hypertension    Smokers' cough (HCC)    Unstable angina (HCC)    Lower GI bleed    ESRD (end stage  renal disease) on dialysis (HCC)    PAF (paroxysmal atrial fibrillation) ([facility])    AVM (arteriovenous malformation) of colon    ABLA (acute blood loss anemia)    Rectal bleeding    Anticoagulated    Antiplatelet or antithrombotic long-term use    Lower GI bleeding    ESRD on hemodialysis ([facility])    GI bleed    Closed displaced midcervical fracture of left femur with delayed healing    ESRD (end stage renal disease) ([facility])    Closed fracture of left hip ([facility])    Hyperphosphatemia    Respiratory failure ([facility])    Anemia       Objective:   Temp: 97.9 °F (36.6 °C)  Pulse: 90  Resp: 20  BP: 157/72    Intake/Output:     Intake/Output Summary (Last 24 hours) at 10/8/2024 0728  Last data filed at 10/7/2024 2200  Gross per 24 hour   Intake 0 ml   Output 2500 ml   Net -2500 ml       Last 3 Weights   10/05/24 0600 144 lb 10 oz (65.6 kg)   10/04/24 0600 145 lb 8.1 oz (66 kg)   10/02/24 1310 156 lb 4.9 oz (70.9 kg)   10/02/24 0800 143 lb 11.8 oz (65.2 kg)   10/01/24 0600 160 lb 0.9 oz (72.6 kg)   09/30/24 0400 164 lb 0.4 oz (74.4 kg)   09/28/24 1649 155 lb 11.2 oz (70.6 kg)   08/22/24 1311 159 lb 12.8 oz (72.5 kg)   08/20/24 0933 158 lb (71.7 kg)       Tele: NSR PAC' s    Physical Exam:    General: Awake   HEENT: Normocephalic, anicteric sclera NG tube out   Neck: supple  Cardiac: Regular rate and rhythm. S1, S2 normal. No murmur, pericardial rub, S3, thrill, heave or extra cardiac sounds.  Lungs: Coarse breath sounds bilat  Abdomen: Soft, non-distended  Extremities: Without clubbing, cyanosis or edema.  Peripheral pulses are 2+. SCD boots   Neurologic: Alert+confused   Skin: Warm and dry.     Laboratory/Data:    Labs:         Recent Labs   Lab 10/02/24  0450 10/03/24  0421 10/04/24  0307 10/04/24  1411 10/05/24  0338 10/07/24  0407   WBC 8.1 8.5 8.3  --   --  12.6*   HGB 9.3* 10.3* 9.9* 9.1* 7.8* 7.9*   MCV 89.9 90.1 92.4  --   --  92.2   .0 176.0 182.0  --   --  222.0       Recent Labs   Lab 10/02/24  0437 10/03/24  5763  10/04/24  0307 10/05/24  0338 10/07/24  0409    140 137 135* 126*   K 3.4* 4.0 3.4* 3.3* 4.6   CL 98 100 99 99 92*   CO2 24.0 27.0 25.0 26.0 22.0   BUN 61* 48* 70* 39* 65*   CREATSERUM 5.88* 4.67* 6.05* 4.01* 6.84*   CA 9.9 10.4 10.1 8.7 10.0   MG 2.2  --   --   --   --    PHOS  --   --   --   --  9.6*   * 215* 261* 189* 311*       Recent Labs   Lab 10/07/24  0409   ALB 3.6       No results for input(s): \"TROP\" in the last 168 hours.    Allergies:   Allergies   Allergen Reactions    Adhesive Tape OTHER (SEE COMMENTS)     Severe rashes    Dust Mite Extract RASH       Medications:  Current Facility-Administered Medications   Medication Dose Route Frequency    HYDROmorphone (Dilaudid) 1 MG/ML injection 0.1 mg  0.1 mg Intravenous Q2H PRN    Or    HYDROmorphone (Dilaudid) 1 MG/ML injection 0.2 mg  0.2 mg Intravenous Q2H PRN    Or    HYDROmorphone (Dilaudid) 1 MG/ML injection 0.4 mg  0.4 mg Intravenous Q2H PRN    methylPREDNISolone sodium succinate (Solu-MEDROL) injection 40 mg  40 mg Intravenous Q8H    sodium chloride 0.9 % IV bolus 100 mL  100 mL Intravenous Q30 Min PRN    And    albumin human (Albumin) 25% injection 25 g  25 g Intravenous PRN Dialysis    epoetin donna (Epogen, Procrit) 5,000 Units injection  5,000 Units Subcutaneous Once per day on Monday Wednesday Friday    fluconazole (Diflucan) tab 100 mg  100 mg Oral Nightly    metoclopramide (Reglan) 5 mg/mL injection 10 mg  10 mg Intravenous Q24H PRN    sodium chloride 0.9 % irrigation    Continuous PRN    sodium chloride 0.9 % irrigation    Continuous PRN    ondansetron (Zofran) 4 MG/2ML injection 4 mg  4 mg Intravenous Q6H PRN    diphenhydrAMINE (Benadryl) cap/tab 25 mg  25 mg Oral Q4H PRN    Or    diphenhydrAMINE (Benadryl) 50 mg/mL  injection 12.5 mg  12.5 mg Intravenous Q4H PRN    heparin (Porcine) 5000 UNIT/ML injection 5,000 Units  5,000 Units Subcutaneous Q8H STEPHANIE    dextrose 10% infusion (TPN no rate)   Intravenous Continuous PRN     pancrelipase (Lip-Prot-Amyl) (Zenpep) DR particles cap 10,000 Units  10,000 Units Per G Tube PRN    And    sodium bicarbonate tab 325 mg  325 mg Oral PRN    senna (Senokot) 8.8 MG/5ML oral syrup 17.6 mg  10 mL Oral BID    docusate (Colace) 50 MG/5ML oral solution 100 mg  100 mg Oral BID    mineral oil-white petrolatum (Artificial Tears) 83-15 % ophthalmic ointment 1 Application  1 Application Both Eyes Nightly    lidocaine-menthol 4-1 % patch 2 patch  2 patch Transdermal Daily    pantoprazole (Protonix) 40 mg in sodium chloride 0.9% PF 10 mL IV push  40 mg Intravenous Q12H    hydrALAzine (Apresoline) 20 mg/mL injection 10 mg  10 mg Intravenous Q6H PRN    labetalol (Trandate) 5 mg/mL injection 10 mg  10 mg Intravenous Q4H PRN    cetirizine (ZyrTEC) tab 5 mg  5 mg Per NG Tube Daily    carvedilol (Coreg) tab 25 mg  25 mg Per NG Tube BID    atorvastatin (Lipitor) tab 40 mg  40 mg Per NG Tube Nightly    acetaminophen (Tylenol) 160 MG/5ML oral liquid 500 mg  500 mg Per NG Tube Daily PRN    acetaminophen (Tylenol) tab 650 mg  650 mg Oral Q4H PRN    Or    acetaminophen (Tylenol) 160 MG/5ML oral liquid 650 mg  650 mg Per NG Tube Q4H PRN    Or    acetaminophen (Tylenol) rectal suppository 650 mg  650 mg Rectal Q4H PRN    polyethylene glycol (PEG 3350) (Miralax) 17 g oral packet 17 g  17 g Per NG Tube Daily PRN    insulin aspart (NovoLOG) 100 Units/mL FlexPen 1-7 Units  1-7 Units Subcutaneous q6h    heparin (Porcine) 1000 UNIT/ML injection 1,500 Units  1.5 mL Intravenous PRN Dialysis    lidocaine-prilocaine (Emla) 2.5-2.5 % cream   Topical PRN    bisacodyl (Dulcolax) 10 MG rectal suppository 10 mg  10 mg Rectal Daily PRN    chlorhexidine gluconate (Peridex) 0.12 % oral solution 15 mL  15 mL Mouth/Throat BID@0800,2000    albuterol (Ventolin HFA) 108 (90 Base) MCG/ACT inhaler 2 puff  2 puff Inhalation Q4H PRN    fluticasone propionate (Flonase) 50 MCG/ACT nasal suspension 2 spray  2 spray Nasal Daily PRN    glucose (Dex4) 15  GM/59ML oral liquid 15 g  15 g Oral Q15 Min PRN    Or    glucose (Glutose) 40% oral gel 15 g  15 g Oral Q15 Min PRN    Or    glucose-vitamin C (Dex-4) chewable tab 4 tablet  4 tablet Oral Q15 Min PRN    Or    dextrose 50% injection 50 mL  50 mL Intravenous Q15 Min PRN    Or    glucose (Dex4) 15 GM/59ML oral liquid 30 g  30 g Oral Q15 Min PRN    Or    glucose (Glutose) 40% oral gel 30 g  30 g Oral Q15 Min PRN    Or    glucose-vitamin C (Dex-4) chewable tab 8 tablet  8 tablet Oral Q15 Min PRN    ipratropium-albuterol (Duoneb) 0.5-2.5 (3) MG/3ML inhalation solution 3 mL  3 mL Nebulization Q6H PRN    fluticasone-salmeterol (Advair Diskus) 250-50 MCG/ACT inhaler 1 puff  1 puff Inhalation BID

## 2024-10-08 NOTE — PROGRESS NOTES
[facility]  part of [facility]    Progress Note      Subjective:     Failed swallow eval yesterday-having a reattempt today.  On 2 L nasal cannula.  Feels better.  Tolerated HD well yesterday.  Family at bedside      Review of Systems:   Constitutional: Improving energy level  Eyes: negative for irritation, redness and visual disturbance  Ears, nose, mouth, throat, and face: negative for hearing loss and sore throat  Respiratory: negative for cough, hemoptysis and wheezing  Cardiovascular: negative for chest pain, exertional dyspnea  Gastrointestinal: negative for abdominal pain, diarrhea and nausea  Hematologic/lymphatic: negative for bleeding and easy bruising  Musculoskeletal:negative for back pain, bone pain and muscle weakness  Neurological: negative for gait problems, memory problems and seizures  Behavioral/Psych: negative for anxiety and depression    Objective:   Temp:  [96.8 °F (36 °C)-98.6 °F (37 °C)] 98.6 °F (37 °C)  Pulse:  [72-97] 82  Resp:  [13-26] 22  BP: (100-173)/(47-98) 168/73  SpO2:  [95 %-100 %] 95 %  SpO2: 95 %     Intake/Output Summary (Last 24 hours) at 10/8/2024 1248  Last data filed at 10/7/2024 2200  Gross per 24 hour   Intake 0 ml   Output 2500 ml   Net -2500 ml     Wt Readings from Last 3 Encounters:   10/05/24 144 lb 10 oz (65.6 kg)   08/22/24 159 lb 12.8 oz (72.5 kg)   08/20/24 158 lb (71.7 kg)       General appearance: Alert and awake.  Oriented.  Currently on 2 L nasal cannula  Head: Normocephalic, atraumatic  Eyes: conjunctivae/corneas clear  Throat: lips, mucosa, and tongue normal; teeth and gums normal  Neck:  no JVD, supple  Extremities: extremities normal, no edema  Skin: No rashes or lesions  Neurologic: Grossly normal  Psychiatric: calm    Medications:  Current Facility-Administered Medications   Medication Dose Route Frequency    clopidogrel (Plavix) tab 75 mg  75 mg Oral Daily    carvedilol (Coreg) tab 25 mg  25 mg Oral BID    HYDROmorphone (Dilaudid)  1 MG/ML injection 0.1 mg  0.1 mg Intravenous Q2H PRN    Or    HYDROmorphone (Dilaudid) 1 MG/ML injection 0.2 mg  0.2 mg Intravenous Q2H PRN    Or    HYDROmorphone (Dilaudid) 1 MG/ML injection 0.4 mg  0.4 mg Intravenous Q2H PRN    epoetin donna (Epogen, Procrit) 5,000 Units injection  5,000 Units Subcutaneous Once per day on Monday Wednesday Friday    metoclopramide (Reglan) 5 mg/mL injection 10 mg  10 mg Intravenous Q24H PRN    sodium chloride 0.9 % irrigation    Continuous PRN    sodium chloride 0.9 % irrigation    Continuous PRN    ondansetron (Zofran) 4 MG/2ML injection 4 mg  4 mg Intravenous Q6H PRN    diphenhydrAMINE (Benadryl) cap/tab 25 mg  25 mg Oral Q4H PRN    Or    diphenhydrAMINE (Benadryl) 50 mg/mL  injection 12.5 mg  12.5 mg Intravenous Q4H PRN    heparin (Porcine) 5000 UNIT/ML injection 5,000 Units  5,000 Units Subcutaneous Q8H STEPHANIE    dextrose 10% infusion (TPN no rate)   Intravenous Continuous PRN    pancrelipase (Lip-Prot-Amyl) (Zenpep) DR particles cap 10,000 Units  10,000 Units Per G Tube PRN    And    sodium bicarbonate tab 325 mg  325 mg Oral PRN    senna (Senokot) 8.8 MG/5ML oral syrup 17.6 mg  10 mL Oral BID    docusate (Colace) 50 MG/5ML oral solution 100 mg  100 mg Oral BID    mineral oil-white petrolatum (Artificial Tears) 83-15 % ophthalmic ointment 1 Application  1 Application Both Eyes Nightly    lidocaine-menthol 4-1 % patch 2 patch  2 patch Transdermal Daily    pantoprazole (Protonix) 40 mg in sodium chloride 0.9% PF 10 mL IV push  40 mg Intravenous Q12H    hydrALAzine (Apresoline) 20 mg/mL injection 10 mg  10 mg Intravenous Q6H PRN    labetalol (Trandate) 5 mg/mL injection 10 mg  10 mg Intravenous Q4H PRN    cetirizine (ZyrTEC) tab 5 mg  5 mg Per NG Tube Daily    atorvastatin (Lipitor) tab 40 mg  40 mg Per NG Tube Nightly    acetaminophen (Tylenol) 160 MG/5ML oral liquid 500 mg  500 mg Per NG Tube Daily PRN    acetaminophen (Tylenol) tab 650 mg  650 mg Oral Q4H PRN    Or     acetaminophen (Tylenol) 160 MG/5ML oral liquid 650 mg  650 mg Per NG Tube Q4H PRN    Or    acetaminophen (Tylenol) rectal suppository 650 mg  650 mg Rectal Q4H PRN    polyethylene glycol (PEG 3350) (Miralax) 17 g oral packet 17 g  17 g Per NG Tube Daily PRN    insulin aspart (NovoLOG) 100 Units/mL FlexPen 1-7 Units  1-7 Units Subcutaneous q6h    heparin (Porcine) 1000 UNIT/ML injection 1,500 Units  1.5 mL Intravenous PRN Dialysis    lidocaine-prilocaine (Emla) 2.5-2.5 % cream   Topical PRN    bisacodyl (Dulcolax) 10 MG rectal suppository 10 mg  10 mg Rectal Daily PRN    albuterol (Ventolin HFA) 108 (90 Base) MCG/ACT inhaler 2 puff  2 puff Inhalation Q4H PRN    fluticasone propionate (Flonase) 50 MCG/ACT nasal suspension 2 spray  2 spray Nasal Daily PRN    glucose (Dex4) 15 GM/59ML oral liquid 15 g  15 g Oral Q15 Min PRN    Or    glucose (Glutose) 40% oral gel 15 g  15 g Oral Q15 Min PRN    Or    glucose-vitamin C (Dex-4) chewable tab 4 tablet  4 tablet Oral Q15 Min PRN    Or    dextrose 50% injection 50 mL  50 mL Intravenous Q15 Min PRN    Or    glucose (Dex4) 15 GM/59ML oral liquid 30 g  30 g Oral Q15 Min PRN    Or    glucose (Glutose) 40% oral gel 30 g  30 g Oral Q15 Min PRN    Or    glucose-vitamin C (Dex-4) chewable tab 8 tablet  8 tablet Oral Q15 Min PRN    ipratropium-albuterol (Duoneb) 0.5-2.5 (3) MG/3ML inhalation solution 3 mL  3 mL Nebulization Q6H PRN    fluticasone-salmeterol (Advair Diskus) 250-50 MCG/ACT inhaler 1 puff  1 puff Inhalation BID              Results:     Recent Labs   Lab 10/04/24  0307 10/04/24  1411 10/05/24  0338 10/07/24  0407 10/08/24  0949   RBC 3.29*  --   --  2.68* 2.51*   HGB 9.9*   < > 7.8* 7.9* 7.7*   HCT 30.4*   < > 23.7* 24.7* 22.9*   MCV 92.4  --   --  92.2 91.2   NEPRELIM 6.51  --   --  11.71*  --    WBC 8.3  --   --  12.6* 21.2*   .0  --   --  222.0 296.0    < > = values in this interval not displayed.     Recent Labs   Lab 10/05/24  0338 10/07/24  0409  10/08/24  0957   * 311* 176*   BUN 39* 65* 61*   CREATSERUM 4.01* 6.84* 5.86*   CA 8.7 10.0 10.4   ALB  --  3.6  --    * 126* 134*   K 3.3* 4.6 4.7   CL 99 92* 96*   CO2 26.0 22.0 26.0     PTT   Date Value Ref Range Status   08/09/2024 30.1 23.0 - 36.0 seconds Final     INR   Date Value Ref Range Status   08/09/2024 1.45 (H) 0.80 - 1.20 Final     Comment:     Therapeutic INR range for patients on warfarin:  2.0-3.0 for most medical conditions and surgical prophylaxis   2.5-3.5 for mechanical heart valves and recurrent thromboembolism    Direct thrombin inhibitors (e.g. argatroban, bivalirudin), factor Xa inhibitors (e.g. apixaban, rivaroxaban), and conditions such as coagulation factor deficiency, vitamin K deficiency, and liver disease will prolong the prothrombin time/INR.    Unfractionated heparin and low molecular weight heparin do not affect the prothrombin time/INR up to a concentration of 1 IU/mL and 1.5 IU/mL, respectively.         No results for input(s): \"BNP\" in the last 168 hours.  Recent Labs   Lab 10/02/24  0437 10/07/24  0409 10/08/24  0957   MG 2.2  --   --    PHOS  --  9.6* 8.6*        Recent Labs   Lab 10/07/24  0409 10/08/24  0957   PHOS 9.6* 8.6*   ALB 3.6  --        XR CHEST AP PORTABLE  (CPT=71045)    Result Date: 10/7/2024  CONCLUSION:   Interval extubation and removal of enteric tube.  Mildly improved right basilar opacity.    Dictated by (CST): Kevin Hensley MD on 10/07/2024 at 3:04 PM     Finalized by (CST): Kevin Hensley MD on 10/07/2024 at 3:05 PM               Assessment and Plan:       79 year old male with past medical history of T2DM, HTN, HLD, ESRD on HD MWF, CAD s/p PCI (5/2024), A.fib on Eliquis, HFpEF, KRAIG, BPH, history of recurrent gastrointestinal bleeding presented to the ER after a fall sustaining a left hip fracture    1.ESRD: HD Monday Wednesday Friday  HD yesterday with 2.5 L UF-next HD tomorrow   hyperphosphatemia: Patient started on diet today.  Low  phos diet.  Start on Renvela-titrate dose per level  Hyponatremia-improved sodium postdialysis at 134.  Fluid restriction 1.5 L    2.anemia: Decline in hemoglobin noted  Patient with history of recurrent GI bleed.  S/p EGD and colonoscopy in July 2024  Hemoglobin 10.5->7.7.  Repeat in a.m.  Plavix resumed  On IV Protonix twice daily  On Epogen- IV iron for couple of doses    3.coronary artery disease status post PCI in May 2024/paroxysmal A-fib status post Watchman device  Cardiology input noted    4.acute respiratory failure: Presumably secondary to aspiration/pulmonary edema  S/p extubation 10/6.  Currently on 2 L nasal cannula and tolerating well    5.left hip fracture: S/p left total hip arthroplasty on 10/4    Discussed with nursing and Dr. Rey and family at bedside    Walker Sheppard MD  10/8/2024

## 2024-10-09 ENCOUNTER — APPOINTMENT (OUTPATIENT)
Dept: GENERAL RADIOLOGY | Facility: HOSPITAL | Age: 79
End: 2024-10-09
Attending: HOSPITALIST
Payer: MEDICARE

## 2024-10-09 LAB
ALBUMIN SERPL-MCNC: 3.6 G/DL (ref 3.2–4.8)
ALBUMIN/GLOB SERPL: 1.1 {RATIO} (ref 1–2)
ALP LIVER SERPL-CCNC: 132 U/L
ALT SERPL-CCNC: <7 U/L
ANION GAP SERPL CALC-SCNC: 14 MMOL/L (ref 0–18)
AST SERPL-CCNC: 53 U/L (ref ?–34)
BASOPHILS # BLD AUTO: 0.06 X10(3) UL (ref 0–0.2)
BASOPHILS NFR BLD AUTO: 0.3 %
BILIRUB SERPL-MCNC: 0.3 MG/DL (ref 0.2–1.1)
BUN BLD-MCNC: 87 MG/DL (ref 9–23)
BUN/CREAT SERPL: 12.3 (ref 10–20)
CALCIUM BLD-MCNC: 9.9 MG/DL (ref 8.7–10.4)
CHLORIDE SERPL-SCNC: 95 MMOL/L (ref 98–112)
CO2 SERPL-SCNC: 25 MMOL/L (ref 21–32)
CREAT BLD-MCNC: 7.07 MG/DL
DEPRECATED RDW RBC AUTO: 53.2 FL (ref 35.1–46.3)
EGFRCR SERPLBLD CKD-EPI 2021: 7 ML/MIN/1.73M2 (ref 60–?)
EOSINOPHIL # BLD AUTO: 0.02 X10(3) UL (ref 0–0.7)
EOSINOPHIL NFR BLD AUTO: 0.1 %
ERYTHROCYTE [DISTWIDTH] IN BLOOD BY AUTOMATED COUNT: 15.5 % (ref 11–15)
GLOBULIN PLAS-MCNC: 3.2 G/DL (ref 2–3.5)
GLUCOSE BLD-MCNC: 256 MG/DL (ref 70–99)
GLUCOSE BLDC GLUCOMTR-MCNC: 149 MG/DL (ref 70–99)
GLUCOSE BLDC GLUCOMTR-MCNC: 200 MG/DL (ref 70–99)
GLUCOSE BLDC GLUCOMTR-MCNC: 274 MG/DL (ref 70–99)
GLUCOSE BLDC GLUCOMTR-MCNC: 304 MG/DL (ref 70–99)
HCT VFR BLD AUTO: 24.1 %
HGB BLD-MCNC: 7.9 G/DL
IMM GRANULOCYTES # BLD AUTO: 0.3 X10(3) UL (ref 0–1)
IMM GRANULOCYTES NFR BLD: 1.6 %
LACTATE SERPL-SCNC: 0.7 MMOL/L (ref 0.5–2)
LYMPHOCYTES # BLD AUTO: 0.52 X10(3) UL (ref 1–4)
LYMPHOCYTES NFR BLD AUTO: 2.7 %
MCH RBC QN AUTO: 30.7 PG (ref 26–34)
MCHC RBC AUTO-ENTMCNC: 32.8 G/DL (ref 31–37)
MCV RBC AUTO: 93.8 FL
MONOCYTES # BLD AUTO: 0.26 X10(3) UL (ref 0.1–1)
MONOCYTES NFR BLD AUTO: 1.4 %
NEUTROPHILS # BLD AUTO: 17.9 X10 (3) UL (ref 1.5–7.7)
NEUTROPHILS # BLD AUTO: 17.9 X10(3) UL (ref 1.5–7.7)
NEUTROPHILS NFR BLD AUTO: 93.9 %
OSMOLALITY SERPL CALC.SUM OF ELEC: 313 MOSM/KG (ref 275–295)
PLATELET # BLD AUTO: 330 10(3)UL (ref 150–450)
POTASSIUM SERPL-SCNC: 4.7 MMOL/L (ref 3.5–5.1)
PROT SERPL-MCNC: 6.8 G/DL (ref 5.7–8.2)
RBC # BLD AUTO: 2.57 X10(6)UL
SODIUM SERPL-SCNC: 134 MMOL/L (ref 136–145)
WBC # BLD AUTO: 19.1 X10(3) UL (ref 4–11)

## 2024-10-09 PROCEDURE — 99233 SBSQ HOSP IP/OBS HIGH 50: CPT | Performed by: HOSPITALIST

## 2024-10-09 PROCEDURE — 71045 X-RAY EXAM CHEST 1 VIEW: CPT | Performed by: HOSPITALIST

## 2024-10-09 PROCEDURE — 99233 SBSQ HOSP IP/OBS HIGH 50: CPT | Performed by: INTERNAL MEDICINE

## 2024-10-09 RX ORDER — METHYLPREDNISOLONE SODIUM SUCCINATE 40 MG/ML
40 INJECTION, POWDER, LYOPHILIZED, FOR SOLUTION INTRAMUSCULAR; INTRAVENOUS EVERY 8 HOURS
Status: DISCONTINUED | OUTPATIENT
Start: 2024-10-09 | End: 2024-10-11

## 2024-10-09 RX ORDER — PREDNISONE 20 MG/1
20 TABLET ORAL
Status: DISCONTINUED | OUTPATIENT
Start: 2024-10-09 | End: 2024-10-09

## 2024-10-09 RX ORDER — IPRATROPIUM BROMIDE AND ALBUTEROL SULFATE 2.5; .5 MG/3ML; MG/3ML
3 SOLUTION RESPIRATORY (INHALATION)
Status: DISCONTINUED | OUTPATIENT
Start: 2024-10-09 | End: 2024-10-12

## 2024-10-09 RX ORDER — METHYLPREDNISOLONE SODIUM SUCCINATE 125 MG/2ML
60 INJECTION, POWDER, LYOPHILIZED, FOR SOLUTION INTRAMUSCULAR; INTRAVENOUS ONCE
Status: COMPLETED | OUTPATIENT
Start: 2024-10-09 | End: 2024-10-09

## 2024-10-09 NOTE — PLAN OF CARE
Patient post-op day five. A&Ox3, confused. Requires frequent reorientation. Patient restless, agitated, removing equipment and not tolerating bi-pap mask. Soft-wrist restraints on per MD. Now on bi-pap, prn albuterol treatment completed. Prn racemic neb treatment completed. One time dose of solumedrol given per MD orders.  Remote tele monitoring no calls. R arm precautions maintained, contacted [facility] for HD per MD orders. Puree diet, nectar thick liquids. Pills crushed with apple sauce. Accucheck ACHS. Max assist. Saline locked. Primofit on. Prevena wound vac on, dressing c/d/I. Bed alarm on. Camera placed for safety. Frequent monitoring in place.     Problem: Patient Centered Care  Goal: Patient preferences are identified and integrated in the patient's plan of care  Description: Interventions:  - What would you like us to know as we care for you?   - Provide timely, complete, and accurate information to patient/family  - Incorporate patient and family knowledge, values, beliefs, and cultural backgrounds into the planning and delivery of care  - Encourage patient/family to participate in care and decision-making at the level they choose  - Honor patient and family perspectives and choices  Outcome: Not Progressing     Problem: Diabetes/Glucose Control  Goal: Glucose maintained within prescribed range  Description: INTERVENTIONS:  - Monitor Blood Glucose as ordered  - Assess for signs and symptoms of hyperglycemia and hypoglycemia  - Administer ordered medications to maintain glucose within target range  - Assess barriers to adequate nutritional intake and initiate nutrition consult as needed  - Instruct patient on self management of diabetes  Outcome: Not Progressing     Problem: Patient/Family Goals  Goal: Patient/Family Long Term Goal  Description: Patient's Long Term Goal: Go home    Interventions:  - PT/OT, monitor labs, monitor imaging, administer medications, pulmonology  - See additional Care Plan goals  for specific interventions  Outcome: Not Progressing  Goal: Patient/Family Short Term Goal  Description: Patient's Short Term Goal: Pain control    Interventions:   - prn pain medications  - See additional Care Plan goals for specific interventions  Outcome: Not Progressing     Problem: PAIN - ADULT  Goal: Verbalizes/displays adequate comfort level or patient's stated pain goal  Description: INTERVENTIONS:  - Encourage pt to monitor pain and request assistance  - Assess pain using appropriate pain scale  - Administer analgesics based on type and severity of pain and evaluate response  - Implement non-pharmacological measures as appropriate and evaluate response  - Consider cultural and social influences on pain and pain management  - Manage/alleviate anxiety  - Utilize distraction and/or relaxation techniques  - Monitor for opioid side effects  - Notify MD/LIP if interventions unsuccessful or patient reports new pain  - Anticipate increased pain with activity and pre-medicate as appropriate  Outcome: Not Progressing     Problem: SAFETY ADULT - FALL  Goal: Free from fall injury  Description: INTERVENTIONS:  - Assess pt frequently for physical needs  - Identify cognitive and physical deficits and behaviors that affect risk of falls.  - [place] fall precautions as indicated by assessment.  - Educate pt/family on patient safety including physical limitations  - Instruct pt to call for assistance with activity based on assessment  - Modify environment to reduce risk of injury  - Provide assistive devices as appropriate  - Consider OT/PT consult to assist with strengthening/mobility  - Encourage toileting schedule  Outcome: Not Progressing     Problem: DISCHARGE PLANNING  Goal: Discharge to home or other facility with appropriate resources  Description: INTERVENTIONS:  - Identify barriers to discharge w/pt and caregiver  - Include patient/family/discharge partner in discharge planning  - Arrange for needed discharge  resources and transportation as appropriate  - Identify discharge learning needs (meds, wound care, etc)  - Arrange for interpreters to assist at discharge as needed  - Consider post-discharge preferences of patient/family/discharge partner  - Complete POLST form as appropriate  - Assess patient's ability to be responsible for managing their own health  - Refer to Case Management Department for coordinating discharge planning if the patient needs post-hospital services based on physician/LIP order or complex needs related to functional status, cognitive ability or social support system  Outcome: Not Progressing     Problem: CARDIOVASCULAR - ADULT  Goal: Maintains optimal cardiac output and hemodynamic stability  Description: INTERVENTIONS:  - Monitor vital signs, rhythm, and trends  - Monitor for bleeding, hypotension and signs of decreased cardiac output  - Evaluate effectiveness of vasoactive medications to optimize hemodynamic stability  - Monitor arterial and/or venous puncture sites for bleeding and/or hematoma  - Assess quality of pulses, skin color and temperature  - Assess for signs of decreased coronary artery perfusion - ex. Angina  - Evaluate fluid balance, assess for edema, trend weights  Outcome: Not Progressing  Goal: Absence of cardiac arrhythmias or at baseline  Description: INTERVENTIONS:  - Continuous cardiac monitoring, monitor vital signs, obtain 12 lead EKG if indicated  - Evaluate effectiveness of antiarrhythmic and heart rate control medications as ordered  - Initiate emergency measures for life threatening arrhythmias  - Monitor electrolytes and administer replacement therapy as ordered  Outcome: Not Progressing     Problem: RESPIRATORY - ADULT  Goal: Achieves optimal ventilation and oxygenation  Description: INTERVENTIONS:  - Assess for changes in respiratory status  - Assess for changes in mentation and behavior  - Position to facilitate oxygenation and minimize respiratory effort  - Oxygen  supplementation based on oxygen saturation or ABGs  - Provide Smoking Cessation handout, if applicable  - Encourage broncho-pulmonary hygiene including cough, deep breathe, Incentive Spirometry  - Assess the need for suctioning and perform as needed  - Assess and instruct to report SOB or any respiratory difficulty  - Respiratory Therapy support as indicated  - Manage/alleviate anxiety  - Monitor for signs/symptoms of CO2 retention  Outcome: Not Progressing     Problem: GASTROINTESTINAL - ADULT  Goal: Maintains or returns to baseline bowel function  Description: INTERVENTIONS:  - Assess bowel function  - Maintain adequate hydration with IV or PO as ordered and tolerated  - Evaluate effectiveness of GI medications  - Encourage mobilization and activity  - Obtain nutritional consult as needed  - Establish a toileting routine/schedule  - Consider collaborating with pharmacy to review patient's medication profile  Outcome: Not Progressing

## 2024-10-09 NOTE — PROGRESS NOTES
[facility]      [facility] Cardiology Progress Note    Ollie Hernández Patient Status:  Inpatient    1945 MRN [id number]   Location [facility] system 2W/SW Attending Pranay Michel MD   Hosp Day # 11 PCP Wili Parmar MD       Impression/Plan:  79 year old male presenting with:    Impression:  Hip fracture Left - S/P L anterior total hip arthroplasty 10/4/24  Acute resp failure, aspiration and/or pulmonary edema; intubated 2024 extubated 10/6  -Failed weaning trial yesterday  ESRD on HD  DM  PAF, currently SR. S/p watchman device 24  HTN  HL, on statin PTA  Acute on chronic diastolic CHF  CAD s/p [place] circ 24   Coffee grounds in OG tube  -Slight decline in Hb post-op  Elevated troponin, likely demand ischemia    Plan:  - Cont carvedilol for rate control BP. Follow BP as restarts PRN labetalol    - Cont statin  - Asa/plavix on hold (recent Watchman implant 2024); Recheck Hb in Am - resume once safely able and taking PO   - Plavix restarted 10/8 Hb stable taking PO  monitor for bleeding   - HD as per nephrology  - Vent management as per pulm.    - Monitor on tele  - Reviewed w/ family at bedside         Subjective:     Breathing OK . Throat sore. 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)    Atherosclerosis of native arteries of extremities with intermittent claudication, bilateral legs (HCC)    Primary hypertension    Smokers' cough (HCC)    Unstable  angina (HCC)    Lower GI bleed    ESRD (end stage renal disease) on dialysis (HCC)    PAF (paroxysmal atrial fibrillation) ([facility])    AVM (arteriovenous malformation) of colon    ABLA (acute blood loss anemia)    Rectal bleeding    Anticoagulated    Antiplatelet or antithrombotic long-term use    Lower GI bleeding    ESRD on hemodialysis (HCC)    GI bleed    Closed displaced midcervical fracture of left femur with delayed healing    ESRD (end stage renal disease) ([facility])    Closed fracture of left hip ([facility])    Hyperphosphatemia    Respiratory failure (HCC)    Anemia       Objective:   Temp: 98.3 °F (36.8 °C)  Pulse: 81  Resp: 20  BP: 162/62    Intake/Output:     Intake/Output Summary (Last 24 hours) at 10/9/2024 0834  Last data filed at 10/8/2024 1500  Gross per 24 hour   Intake 180 ml   Output 0 ml   Net 180 ml       Last 3 Weights   10/05/24 0600 144 lb 10 oz (65.6 kg)   10/04/24 0600 145 lb 8.1 oz (66 kg)   10/02/24 1310 156 lb 4.9 oz (70.9 kg)   10/02/24 0800 143 lb 11.8 oz (65.2 kg)   10/01/24 0600 160 lb 0.9 oz (72.6 kg)   09/30/24 0400 164 lb 0.4 oz (74.4 kg)   09/28/24 1649 155 lb 11.2 oz (70.6 kg)   08/22/24 1311 159 lb 12.8 oz (72.5 kg)   08/20/24 0933 158 lb (71.7 kg)       Tele: NSR PAC' s    Physical Exam:    General: Awake   HEENT: Normocephalic, anicteric sclera NG tube out   Neck: supple  Cardiac: Regular rate and rhythm. S1, S2 normal. No murmur, pericardial rub, S3, thrill, heave or extra cardiac sounds.  Lungs: Coarse breath sounds bilat  Abdomen: Soft, non-distended  Extremities: Without clubbing, cyanosis or edema.  Peripheral pulses are 2+. SCD boots   Neurologic: Alert+confused   Skin: Warm and dry.     Laboratory/Data:    Labs:         Recent Labs   Lab 10/03/24  0421 10/04/24  0307 10/04/24  1411 10/05/24  0338 10/07/24  0407 10/08/24  0949 10/09/24  0643   WBC 8.5 8.3  --   --  12.6* 21.2* 19.1*   HGB 10.3* 9.9* 9.1* 7.8* 7.9* 7.7* 7.9*   MCV 90.1 92.4  --   --  92.2 91.2 93.8   .0 182.0   --   --  222.0 296.0 330.0       Recent Labs   Lab 10/04/24  0307 10/05/24  0338 10/07/24  0409 10/08/24  0957 10/09/24  0643    135* 126* 134* 134*   K 3.4* 3.3* 4.6 4.7 4.7   CL 99 99 92* 96* 95*   CO2 25.0 26.0 22.0 26.0 25.0   BUN 70* 39* 65* 61* 87*   CREATSERUM 6.05* 4.01* 6.84* 5.86* 7.07*   CA 10.1 8.7 10.0 10.4 9.9   PHOS  --   --  9.6* 8.6*  --    * 189* 311* 176* 256*       Recent Labs   Lab 10/07/24  0409 10/09/24  0643   ALT  --  <7*   AST  --  53*   ALB 3.6 3.6       No results for input(s): \"TROP\" in the last 168 hours.    Allergies:   Allergies   Allergen Reactions    Adhesive Tape OTHER (SEE COMMENTS)     Severe rashes    Dust Mite Extract RASH       Medications:  Current Facility-Administered Medications   Medication Dose Route Frequency    clopidogrel (Plavix) tab 75 mg  75 mg Oral Daily    carvedilol (Coreg) tab 25 mg  25 mg Oral BID    sevelamer carbonate (Renvela) tab 800 mg  800 mg Oral TID CC    insulin aspart (NovoLOG) 100 Units/mL FlexPen 1-7 Units  1-7 Units Subcutaneous TID CC and HS    atorvastatin (Lipitor) tab 40 mg  40 mg Oral Nightly    sodium chloride 0.9 % IV bolus 100 mL  100 mL Intravenous Q30 Min PRN    And    albumin human (Albumin) 25% injection 25 g  25 g Intravenous PRN Dialysis    HYDROmorphone (Dilaudid) 1 MG/ML injection 0.1 mg  0.1 mg Intravenous Q2H PRN    Or    HYDROmorphone (Dilaudid) 1 MG/ML injection 0.2 mg  0.2 mg Intravenous Q2H PRN    Or    HYDROmorphone (Dilaudid) 1 MG/ML injection 0.4 mg  0.4 mg Intravenous Q2H PRN    epoetin donna (Epogen, Procrit) 5,000 Units injection  5,000 Units Subcutaneous Once per day on Monday Wednesday Friday    metoclopramide (Reglan) 5 mg/mL injection 10 mg  10 mg Intravenous Q24H PRN    sodium chloride 0.9 % irrigation    Continuous PRN    sodium chloride 0.9 % irrigation    Continuous PRN    ondansetron (Zofran) 4 MG/2ML injection 4 mg  4 mg Intravenous Q6H PRN    diphenhydrAMINE (Benadryl) cap/tab 25 mg  25 mg Oral  Q4H PRN    Or    diphenhydrAMINE (Benadryl) 50 mg/mL  injection 12.5 mg  12.5 mg Intravenous Q4H PRN    heparin (Porcine) 5000 UNIT/ML injection 5,000 Units  5,000 Units Subcutaneous Q8H STEPHANIE    dextrose 10% infusion (TPN no rate)   Intravenous Continuous PRN    pancrelipase (Lip-Prot-Amyl) (Zenpep) DR particles cap 10,000 Units  10,000 Units Per G Tube PRN    And    sodium bicarbonate tab 325 mg  325 mg Oral PRN    senna (Senokot) 8.8 MG/5ML oral syrup 17.6 mg  10 mL Oral BID    docusate (Colace) 50 MG/5ML oral solution 100 mg  100 mg Oral BID    mineral oil-white petrolatum (Artificial Tears) 83-15 % ophthalmic ointment 1 Application  1 Application Both Eyes Nightly    lidocaine-menthol 4-1 % patch 2 patch  2 patch Transdermal Daily    pantoprazole (Protonix) 40 mg in sodium chloride 0.9% PF 10 mL IV push  40 mg Intravenous Q12H    hydrALAzine (Apresoline) 20 mg/mL injection 10 mg  10 mg Intravenous Q6H PRN    labetalol (Trandate) 5 mg/mL injection 10 mg  10 mg Intravenous Q4H PRN    cetirizine (ZyrTEC) tab 5 mg  5 mg Per NG Tube Daily    acetaminophen (Tylenol) 160 MG/5ML oral liquid 500 mg  500 mg Per NG Tube Daily PRN    acetaminophen (Tylenol) tab 650 mg  650 mg Oral Q4H PRN    Or    acetaminophen (Tylenol) 160 MG/5ML oral liquid 650 mg  650 mg Per NG Tube Q4H PRN    Or    acetaminophen (Tylenol) rectal suppository 650 mg  650 mg Rectal Q4H PRN    polyethylene glycol (PEG 3350) (Miralax) 17 g oral packet 17 g  17 g Per NG Tube Daily PRN    heparin (Porcine) 1000 UNIT/ML injection 1,500 Units  1.5 mL Intravenous PRN Dialysis    lidocaine-prilocaine (Emla) 2.5-2.5 % cream   Topical PRN    bisacodyl (Dulcolax) 10 MG rectal suppository 10 mg  10 mg Rectal Daily PRN    albuterol (Ventolin HFA) 108 (90 Base) MCG/ACT inhaler 2 puff  2 puff Inhalation Q4H PRN    fluticasone propionate (Flonase) 50 MCG/ACT nasal suspension 2 spray  2 spray Nasal Daily PRN    glucose (Dex4) 15 GM/59ML oral liquid 15 g  15 g Oral Q15 Min  PRN    Or    glucose (Glutose) 40% oral gel 15 g  15 g Oral Q15 Min PRN    Or    glucose-vitamin C (Dex-4) chewable tab 4 tablet  4 tablet Oral Q15 Min PRN    Or    dextrose 50% injection 50 mL  50 mL Intravenous Q15 Min PRN    Or    glucose (Dex4) 15 GM/59ML oral liquid 30 g  30 g Oral Q15 Min PRN    Or    glucose (Glutose) 40% oral gel 30 g  30 g Oral Q15 Min PRN    Or    glucose-vitamin C (Dex-4) chewable tab 8 tablet  8 tablet Oral Q15 Min PRN    ipratropium-albuterol (Duoneb) 0.5-2.5 (3) MG/3ML inhalation solution 3 mL  3 mL Nebulization Q6H PRN    fluticasone-salmeterol (Advair Diskus) 250-50 MCG/ACT inhaler 1 puff  1 puff Inhalation BID

## 2024-10-09 NOTE — CM/SW NOTE
SW met w/ pt and spouse at bedside to discuss anticipated therapy need of [facility].    SW discussed [facility] referral process and briefly explained [facility] list of quality data (left w/ pt's wife at bedside). Explained all facilities provided have onsite HD available.    Pt is unsure if he wants to go to rehab or not at this time.     Hep B labs and HD Flowsheets uploaded to [facility] referral in [facility].    Pt is currently on 3L O2 w/ no home oxygen. SW/CM to monitor for needs closer to DC and if pt decides to return home.    Need for DC:  Confirm HH vs [facility]  If [facility]: Choice, Ins Auth  If Home: confirm O2 needs    PLAN: [facility] w/ onsite HD vs Home w/ Residential HHC - pending above & med clear      SW/CM to remain available for support and/or discharge planning.         RUTH Verdugo, LSW [id number]

## 2024-10-09 NOTE — PROGRESS NOTES
[facility]  part of [facility]    Progress Note    Ollie Hernández Patient Status:  Inpatient    1945 MRN [id number]   Location [facility] 2W/SW Attending Pranay Michel MD   Hosp Day # 11 PCP Wili Parmar MD     Chief Complaint:   Chief Complaint   Patient presents with    Fall       Subjective:   Ollie Hernández   Status post left hip arthroplasty on 10/4/2024.      Patient successfully extubated 10/6/2024.    Patient is struggling to breathe. Concern for aspiration.   Patient will be transferred to the ICU.  Stat CXR ordered and reviewed.   Discussed with Renal.       Objective:   Objective:    Blood pressure 156/62, pulse 76, temperature 97.6 °F (36.4 °C), temperature source Axillary, resp. rate 22, height 5' 4.02\" (1.626 m), weight 144 lb 10 oz (65.6 kg), SpO2 100%.    Physical Exam:    General: Alert comfortable in no acute distress on 2 L nasal cannula  HEENT: Normocephalic, anicteric sclera   Neck: supple  Cardiac: Regular rate and rhythm. S1, S2 normal. No murmurs thrills or rubs  Lungs: Coarse breath sounds bilat  Abdomen: Soft, non-distended  Extremities: Without clubbing, cyanosis or edema.   Skin: Warm and dry.       Results:   Results:    Labs:  Recent Labs   Lab 10/03/24  0421 10/04/24  0307 10/04/24  1411 10/05/24  0338 10/07/24  0407 10/08/24  0949 10/09/24  0643   WBC 8.5 8.3  --   --  12.6* 21.2* 19.1*   HGB 10.3* 9.9* 9.1* 7.8* 7.9* 7.7* 7.9*   MCV 90.1 92.4  --   --  92.2 91.2 93.8   .0 182.0  --   --  222.0 296.0 330.0       Recent Labs   Lab 10/07/24  0409 10/08/24  0957 10/09/24  0643   * 176* 256*   BUN 65* 61* 87*   CREATSERUM 6.84* 5.86* 7.07*   CA 10.0 10.4 9.9   ALB 3.6  --  3.6   * 134* 134*   K 4.6 4.7 4.7   CL 92* 96* 95*   CO2 22.0 26.0 25.0   ALKPHO  --   --  132*   AST  --   --  53*   ALT  --   --  <7*   BILT  --   --  0.3   TP  --   --  6.8       Estimated Creatinine Clearance: 7.3 mL/min (A) (based on SCr of 7.07  mg/dL (H)).    No results for input(s): \"PTP\", \"INR\" in the last 168 hours.         Culture:  Hospital Encounter on 09/28/24   1. Blood Culture     Status: None    Collection Time: 09/30/24 10:03 AM    Specimen: Bld,Arterial Line; Blood   Result Value Ref Range    Blood Culture Result No Growth 5 Days N/A       Cardiac  No results for input(s): \"TROP\", \"PBNP\" in the last 168 hours.      Imaging: Imaging data reviewed in Epic.  No results found.    Medications:    ipratropium-albuterol  3 mL Nebulization 4 times per day    methylPREDNISolone  40 mg Intravenous Q8H    clopidogrel  75 mg Oral Daily    carvedilol  25 mg Oral BID    sevelamer carbonate  800 mg Oral TID CC    insulin aspart  1-7 Units Subcutaneous TID CC and HS    atorvastatin  40 mg Oral Nightly    epoetin donna  5,000 Units Subcutaneous Once per day on Monday Wednesday Friday    heparin  5,000 Units Subcutaneous Q8H STEPHANIE    senna  10 mL Oral BID    docusate  100 mg Oral BID    mineral oil-white petrolatum  1 Application Both Eyes Nightly    lidocaine-menthol  2 patch Transdermal Daily    pantoprazole  40 mg Intravenous Q12H    cetirizine  5 mg Per NG Tube Daily    fluticasone-salmeterol  1 puff Inhalation BID         Assessment and Plan:   Assessment & Plan:      Acute hypoxic respiratory failure   Hypotension   Aspiration PNA  Pulmonary and cardiology on consult.   Secondary to pulmonary edema and aspiration  Intubated 9/29- extubated on 10/6/2024  Empiric zosyn.  Completed 5 days  Sputum cx normal julio c. Blood cx x 4 NGTD  Volume management per nephrology   CXR pulmonary edema. Improved alveolar opacities.   10/6: extubated  10/8: now on 2L O2NC, repeat CXR, SLP, PT , OT  10/9- now struggling to breathe. Will be transferred to the ICU. Needs emergent dialysis. Discussed with Pulm and nursing         ESRD   HD per nephrology [facility]  Nephro on consult.   10/7: s/p HD         Hip fracture   S/p fall PTA -status post left hip arthroplasty on  10/4/2024  Orthopedic surgery on consult.   High risk for surgery  however surgery is necessary.   IV dilaudid panel, transition to po narcotics once passes swallow evaluation    Leukocytosis: Possibly reactive?  Has been uptrending in the last 48 hours, no clear evidence of new infectious source.  No fever  Chest x-ray from 10/7/2024:  Mildly improved right basilar opacity.   Hold off further antibiotics at this time just received Zosyn for 5 days.  Continue to monitor clinically and trend leukocytosis daily.      DM II   A1c   ISS     Paroxysmal A.fib - currently NSR   Acute on chronic HFpEF  CAD s/p [place] circ 5/24'   Elevate troponin secondary to demand ischemia   S/p watchman device 9/11/24  Cont coreg, statin  No further cardiac testing prior to planned surgery.   Resume ASA/Plavix once safely able to do so.     Coffee ground emesis   Resolved.   IV protonix BID   Hemoglobin downtrending postoperatively from 9.1-7.7.  Continue to monitor closely       >55min spent, >50% spent counseling and coordinating care in the form of educating pt/family and d/w consultants and staff. Most of the time spent discussing the above plan.        Plan of care discussed with patient or family at bedside.    Ne Irizarry MD          Supplementary Documentation:     Quality:  DVT Prophylaxis: heparin   CODE status: Full  Dispo: per clinical course            Estimated date of discharge: TBD  Discharge is dependent on: clinical stability  At this point Mr. Hernández is expected to be discharge to: TBD

## 2024-10-09 NOTE — PHYSICAL THERAPY NOTE
Physical Therapy Contact Note    Orders received and chart reviewed. Attempted to see patient for Physical Therapy services at 1145. Patient not available secondary to patient about to start dialysis. Will re-attempt pending patient availability/appropriateness as schedule allows, otherwise will re-schedule visit.    Daria Rivas, PT, DPT  [facility]  Rehab Services - Physical Therapy  [id number]

## 2024-10-09 NOTE — RESPIRATORY THERAPY NOTE
Patient placed back on BIPAP 12/5 30% due to increased WOB and tachypnea. Nebulizer treatment was provided. Bilateral expiratory wheezes heard on auscultation.Patient saturating %. RN made aware. Will continue to monitor.

## 2024-10-09 NOTE — PROGRESS NOTES
Critical Care Progress Note     Assessment / Plan:  Acute respiratory failure - due to pulmonary edema and/or aspiration. Intubated 9/29. CXR with interval improvement  - extubated 10/6  - s/p Zosyn x 5 days  - no stridor on exam this morning again, but reported overnight. Suspect possible airway clearance issue  - start pred 20mg daily  - duonebs q6H  - volume management with HD  ESRD - hyponatremic on labs today  - HD per nephrology  KRAIG  - on CPAP at home, resumed here  Asthma  - steroids as above  - Advair in lieu of Symbicort  CV: h/o pAFib  - S/p watchman 9/2024.   - Per cardiology.   Hip fracture after fall  - s/p L FRANCE 10/4  - per ortho.   Ppx  - subcu heparin  - PPI  Dispo  - will follow    Subjective:  Reports of dyspnea and agitation overnight    Objective:  Vitals:    10/09/24 0002 10/09/24 0152 10/09/24 0255 10/09/24 0500   BP:  (!) 153/96  (!) 162/62   BP Location:  Left arm  Left arm   Pulse:  71  81   Resp:  (!) 28  20   Temp:  98.1 °F (36.7 °C)  98.3 °F (36.8 °C)   TempSrc:  Oral  Axillary   SpO2: 100% 100% 100% 100%   Weight:       Height:         Physical Exam:  General - laying in bed on HD  Respiratory - clear bilaterally, no upper airway sounds  Cardiovascular - regular rate and rhythm  Abdo - soft, NTND  Ext - no LE edema, Left hip dressing not taken down  Mental status - interactive, answering questions appropriately. Confused initially but easily oriented    Medications:  Reviewed in EMR    Lab Data:  Reviewed in EMR    Imaging:  I independently visualized all relevant chest imaging in PACS and agree with radiology interpretation except where noted.

## 2024-10-09 NOTE — SLP NOTE
SPEECH DAILY NOTE - INPATIENT    ASSESSMENT & PLAN   ASSESSMENT  PPE REQUIRED. THIS THERAPIST WORE GLOVES, DROPLET MASK, AND GOGGLES FOR DURATION OF EVALUATION. HANDS WASHED UPON ENTRANCE/EXIT.    SLP f/u for ongoing dysphagia tx/meal assessment per recommendations of puree/mildly thick liquids per BSE. RN reports pt tolerates diet and medication well with no overt clinical s/s aspiration. Pt denies any swallowing challenges.     Pt positioned upright in bed with wife at bedside. Pt afebrile, tolerating 3L/Min O2NC with oxygen status 100 prior to the start of oral trials. SLP reviewed aspiration precautions and safe swallowing compensatory strategies with the patient. Safe swallow guidelines remain written on the white board in purple. Diet recommendations remain on the whiteboard in the room. Patient and family v/u. Provided 1:1 feed assistance, pt tolerates puree and trials of advanced solids with no overt clinical signs/symptoms of aspiration. Mildly prolonged mastication observed with hard solids. Pt declined all liquid trials. Oral/buccal cavities clear of residue following trials. Oxygen status remained >99 t/o the entire session. Recommend upgrade to bite sized solids.     PLAN: SLP to f/u x2-3 meal assessments, monitor CXR, and VFSS if clinically warranted.     Diet Recommendations - Solids: Mechanical soft chopped/ Soft & Bite Sized  Diet Recommendations - Liquids: Nectar thick liquids/ Mildly thick    Compensatory Strategies Recommended: No straws;Slow rate;Small bites and sips  Aspiration Precautions: Upright position;Slow rate;Small bites and sips;No straw  Medication Administration Recommendations: Crushed in puree    Patient Experiencing Pain: No    Treatment Plan  Treatment Plan/Recommendations: Aspiration precautions    Interdisciplinary Communication: Discussed with RN  Recommendations posted at bedside            GOALS  Goal #1 The patient will tolerate bite sized consistency and mildly thick  liquids without overt signs or symptoms of aspiration with 100 % accuracy over 1-2 session(s).  Goal modified 10/9   Goal #2 The patient/family/caregiver will demonstrate understanding and implementation of aspiration precautions and swallow strategies independently over 1-2 session(s).    In Progress   Goal #3 The patient will tolerate trial upgrade of soft/hard solid consistency and thin liquids without overt signs or symptoms of aspiration with 100 % accuracy over 1-2 session(s).  In Progress   Goal #4 The patient will utilize compensatory strategies as outlined by  BSSE (clinical evaluation) including Slow rate, Small bites, Small sips, No straws, Upright 90 degrees, Eliminate distractions with PRN assistance 100 % of the time across 2 sessions.    In Progress     FOLLOW UP  Follow Up Needed (Documentation Required): Yes  SLP Follow-up Date: 10/10/24  Number of Visits to Meet Established Goals: 3    Session: 1 following re-BSSE    If you have any questions, please contact ARTEMIO Acuna M.S. CCC-SLP  Speech Language Pathologist  Phone Number [phone]

## 2024-10-09 NOTE — PROGRESS NOTES
[facility]  part of [facility]    Progress Note      Subjective:     Appears more lethargic today - was agitated last night and under restraints now. Increase WOB - getting started on BIPAP      Review of Systems:     Limited - denies any cp or sob. No abd.pain or NV    Objective:   Temp:  [97.6 °F (36.4 °C)-98.3 °F (36.8 °C)] 97.6 °F (36.4 °C)  Pulse:  [71-85] 76  Resp:  [16-28] 22  BP: (128-172)/(59-96) 156/62  SpO2:  [96 %-100 %] 100 %  SpO2: 100 %     Intake/Output Summary (Last 24 hours) at 10/9/2024 1339  Last data filed at 10/8/2024 1500  Gross per 24 hour   Intake 180 ml   Output 0 ml   Net 180 ml     Wt Readings from Last 3 Encounters:   10/05/24 144 lb 10 oz (65.6 kg)   08/22/24 159 lb 12.8 oz (72.5 kg)   08/20/24 158 lb (71.7 kg)       General appearance: Alert and awake.  Oriented.  Currently on 2 L nasal cannula  Head: Normocephalic, atraumatic  Eyes: conjunctivae/corneas clear  Throat: lips, mucosa, and tongue normal; teeth and gums normal  Neck:  no JVD, supple  Extremities: extremities normal, no edema  Skin: No rashes or lesions  Neurologic: Grossly normal  Psychiatric: calm    Medications:  Current Facility-Administered Medications   Medication Dose Route Frequency    ipratropium-albuterol (Duoneb) 0.5-2.5 (3) MG/3ML inhalation solution 3 mL  3 mL Nebulization 4 times per day    methylPREDNISolone sodium succinate (Solu-MEDROL) injection 40 mg  40 mg Intravenous Q8H    clopidogrel (Plavix) tab 75 mg  75 mg Oral Daily    carvedilol (Coreg) tab 25 mg  25 mg Oral BID    sevelamer carbonate (Renvela) tab 800 mg  800 mg Oral TID CC    insulin aspart (NovoLOG) 100 Units/mL FlexPen 1-7 Units  1-7 Units Subcutaneous TID CC and HS    atorvastatin (Lipitor) tab 40 mg  40 mg Oral Nightly    sodium chloride 0.9 % IV bolus 100 mL  100 mL Intravenous Q30 Min PRN    And    albumin human (Albumin) 25% injection 25 g  25 g Intravenous PRN Dialysis    HYDROmorphone (Dilaudid) 1 MG/ML injection  0.1 mg  0.1 mg Intravenous Q2H PRN    Or    HYDROmorphone (Dilaudid) 1 MG/ML injection 0.2 mg  0.2 mg Intravenous Q2H PRN    Or    HYDROmorphone (Dilaudid) 1 MG/ML injection 0.4 mg  0.4 mg Intravenous Q2H PRN    epoetin donna (Epogen, Procrit) 5,000 Units injection  5,000 Units Subcutaneous Once per day on Monday Wednesday Friday    metoclopramide (Reglan) 5 mg/mL injection 10 mg  10 mg Intravenous Q24H PRN    sodium chloride 0.9 % irrigation    Continuous PRN    sodium chloride 0.9 % irrigation    Continuous PRN    ondansetron (Zofran) 4 MG/2ML injection 4 mg  4 mg Intravenous Q6H PRN    diphenhydrAMINE (Benadryl) cap/tab 25 mg  25 mg Oral Q4H PRN    Or    diphenhydrAMINE (Benadryl) 50 mg/mL  injection 12.5 mg  12.5 mg Intravenous Q4H PRN    heparin (Porcine) 5000 UNIT/ML injection 5,000 Units  5,000 Units Subcutaneous Q8H STEPHANIE    dextrose 10% infusion (TPN no rate)   Intravenous Continuous PRN    pancrelipase (Lip-Prot-Amyl) (Zenpep) DR particles cap 10,000 Units  10,000 Units Per G Tube PRN    And    sodium bicarbonate tab 325 mg  325 mg Oral PRN    senna (Senokot) 8.8 MG/5ML oral syrup 17.6 mg  10 mL Oral BID    docusate (Colace) 50 MG/5ML oral solution 100 mg  100 mg Oral BID    mineral oil-white petrolatum (Artificial Tears) 83-15 % ophthalmic ointment 1 Application  1 Application Both Eyes Nightly    lidocaine-menthol 4-1 % patch 2 patch  2 patch Transdermal Daily    pantoprazole (Protonix) 40 mg in sodium chloride 0.9% PF 10 mL IV push  40 mg Intravenous Q12H    hydrALAzine (Apresoline) 20 mg/mL injection 10 mg  10 mg Intravenous Q6H PRN    labetalol (Trandate) 5 mg/mL injection 10 mg  10 mg Intravenous Q4H PRN    cetirizine (ZyrTEC) tab 5 mg  5 mg Per NG Tube Daily    acetaminophen (Tylenol) 160 MG/5ML oral liquid 500 mg  500 mg Per NG Tube Daily PRN    acetaminophen (Tylenol) tab 650 mg  650 mg Oral Q4H PRN    Or    acetaminophen (Tylenol) 160 MG/5ML oral liquid 650 mg  650 mg Per NG Tube Q4H PRN    Or     acetaminophen (Tylenol) rectal suppository 650 mg  650 mg Rectal Q4H PRN    polyethylene glycol (PEG 3350) (Miralax) 17 g oral packet 17 g  17 g Per NG Tube Daily PRN    heparin (Porcine) 1000 UNIT/ML injection 1,500 Units  1.5 mL Intravenous PRN Dialysis    lidocaine-prilocaine (Emla) 2.5-2.5 % cream   Topical PRN    bisacodyl (Dulcolax) 10 MG rectal suppository 10 mg  10 mg Rectal Daily PRN    albuterol (Ventolin HFA) 108 (90 Base) MCG/ACT inhaler 2 puff  2 puff Inhalation Q4H PRN    fluticasone propionate (Flonase) 50 MCG/ACT nasal suspension 2 spray  2 spray Nasal Daily PRN    glucose (Dex4) 15 GM/59ML oral liquid 15 g  15 g Oral Q15 Min PRN    Or    glucose (Glutose) 40% oral gel 15 g  15 g Oral Q15 Min PRN    Or    glucose-vitamin C (Dex-4) chewable tab 4 tablet  4 tablet Oral Q15 Min PRN    Or    dextrose 50% injection 50 mL  50 mL Intravenous Q15 Min PRN    Or    glucose (Dex4) 15 GM/59ML oral liquid 30 g  30 g Oral Q15 Min PRN    Or    glucose (Glutose) 40% oral gel 30 g  30 g Oral Q15 Min PRN    Or    glucose-vitamin C (Dex-4) chewable tab 8 tablet  8 tablet Oral Q15 Min PRN    fluticasone-salmeterol (Advair Diskus) 250-50 MCG/ACT inhaler 1 puff  1 puff Inhalation BID              Results:     Recent Labs   Lab 10/04/24  0307 10/04/24  1411 10/07/24  0407 10/08/24  0949 10/09/24  0643   RBC 3.29*  --  2.68* 2.51* 2.57*   HGB 9.9*   < > 7.9* 7.7* 7.9*   HCT 30.4*   < > 24.7* 22.9* 24.1*   MCV 92.4  --  92.2 91.2 93.8   NEPRELIM 6.51  --  11.71*  --  17.90*   WBC 8.3  --  12.6* 21.2* 19.1*   .0  --  222.0 296.0 330.0    < > = values in this interval not displayed.     Recent Labs   Lab 10/07/24  0409 10/08/24  0957 10/09/24  0643   * 176* 256*   BUN 65* 61* 87*   CREATSERUM 6.84* 5.86* 7.07*   CA 10.0 10.4 9.9   ALB 3.6  --  3.6   * 134* 134*   K 4.6 4.7 4.7   CL 92* 96* 95*   CO2 22.0 26.0 25.0   ALKPHO  --   --  132*   AST  --   --  53*   ALT  --   --  <7*   BILT  --   --  0.3   TP  --    --  6.8     PTT   Date Value Ref Range Status   08/09/2024 30.1 23.0 - 36.0 seconds Final     INR   Date Value Ref Range Status   08/09/2024 1.45 (H) 0.80 - 1.20 Final     Comment:     Therapeutic INR range for patients on warfarin:  2.0-3.0 for most medical conditions and surgical prophylaxis   2.5-3.5 for mechanical heart valves and recurrent thromboembolism    Direct thrombin inhibitors (e.g. argatroban, bivalirudin), factor Xa inhibitors (e.g. apixaban, rivaroxaban), and conditions such as coagulation factor deficiency, vitamin K deficiency, and liver disease will prolong the prothrombin time/INR.    Unfractionated heparin and low molecular weight heparin do not affect the prothrombin time/INR up to a concentration of 1 IU/mL and 1.5 IU/mL, respectively.         No results for input(s): \"BNP\" in the last 168 hours.  Recent Labs   Lab 10/07/24  0409 10/08/24  0957   PHOS 9.6* 8.6*        Recent Labs   Lab 10/07/24  0409 10/08/24  0957 10/09/24  0643   PHOS 9.6* 8.6*  --    ALB 3.6  --  3.6       XR CHEST AP PORTABLE  (CPT=71045)    Result Date: 10/9/2024  CONCLUSION:   Mild cardiomegaly with interstitial pulmonary edema.   Bibasilar opacities are favored to represent subsegmental atelectasis due to low lung volumes.  Other superimposed process less likely.  Attention on follow-up is recommended.    Dictated by (CST): Apple Pierre MD on 10/09/2024 at 12:18 PM     Finalized by (CST): Apple Pierre MD on 10/09/2024 at 12:20 PM               Assessment and Plan:       79 year old male with past medical history of T2DM, HTN, HLD, ESRD on HD MWF, CAD s/p PCI (5/2024), A.fib on Eliquis, HFpEF, KRAIG, BPH, history of recurrent gastrointestinal bleeding presented to the ER after a fall sustaining a left hip fracture    1.ESRD: HD Monday Wednesday Friday  HD today - changed to stat given increasing WOB   hyperphosphatemia: Low phos diet.  Started on Renvela-titrate dose per level  Hyponatremia-improved sodium  postdialysis at 134.  Fluid restriction 1.5 L    2.anemia: Decline in hemoglobin noted  Patient with history of recurrent GI bleed.  S/p EGD and colonoscopy in July 2024  Hemoglobin 10.5->7.7.  stable.   Plavix resumed  On IV Protonix twice daily  On Epogen-s/p IV iron    3.coronary artery disease status post PCI in May 2024/paroxysmal A-fib status post Watchman device  Cardiology input noted    4.acute respiratory failure: Presumably secondary to aspiration/pulmonary edema  S/p extubation 10/6.  Currently on 2 L nasal cannula and increase WOB - started on BIPAP and transfer to unit  Stat HD with 3 liters UF     5.left hip fracture: S/p left total hip arthroplasty on 10/4    Discussed with nursing and Dr. Irizarry and dialysis Nurse    Walker Sheppard MD  10/9/2024

## 2024-10-10 ENCOUNTER — APPOINTMENT (OUTPATIENT)
Dept: ULTRASOUND IMAGING | Facility: HOSPITAL | Age: 79
End: 2024-10-10
Payer: MEDICARE

## 2024-10-10 LAB
ALBUMIN SERPL-MCNC: 3.9 G/DL (ref 3.2–4.8)
ALBUMIN/GLOB SERPL: 1.1 {RATIO} (ref 1–2)
ALP LIVER SERPL-CCNC: 130 U/L
ALT SERPL-CCNC: <7 U/L
ANION GAP SERPL CALC-SCNC: 14 MMOL/L (ref 0–18)
AST SERPL-CCNC: 43 U/L (ref ?–34)
BASOPHILS # BLD AUTO: 0.03 X10(3) UL (ref 0–0.2)
BASOPHILS NFR BLD AUTO: 0.2 %
BILIRUB SERPL-MCNC: 0.3 MG/DL (ref 0.2–1.1)
BUN BLD-MCNC: 43 MG/DL (ref 9–23)
BUN/CREAT SERPL: 9.4 (ref 10–20)
CALCIUM BLD-MCNC: 9.9 MG/DL (ref 8.7–10.4)
CHLORIDE SERPL-SCNC: 100 MMOL/L (ref 98–112)
CO2 SERPL-SCNC: 27 MMOL/L (ref 21–32)
CREAT BLD-MCNC: 4.59 MG/DL
DEPRECATED RDW RBC AUTO: 52.7 FL (ref 35.1–46.3)
EGFRCR SERPLBLD CKD-EPI 2021: 12 ML/MIN/1.73M2 (ref 60–?)
EOSINOPHIL # BLD AUTO: 0 X10(3) UL (ref 0–0.7)
EOSINOPHIL NFR BLD AUTO: 0 %
ERYTHROCYTE [DISTWIDTH] IN BLOOD BY AUTOMATED COUNT: 15.3 % (ref 11–15)
GLOBULIN PLAS-MCNC: 3.4 G/DL (ref 2–3.5)
GLUCOSE BLD-MCNC: 189 MG/DL (ref 70–99)
GLUCOSE BLDC GLUCOMTR-MCNC: 220 MG/DL (ref 70–99)
GLUCOSE BLDC GLUCOMTR-MCNC: 230 MG/DL (ref 70–99)
GLUCOSE BLDC GLUCOMTR-MCNC: 289 MG/DL (ref 70–99)
GLUCOSE BLDC GLUCOMTR-MCNC: 304 MG/DL (ref 70–99)
HCT VFR BLD AUTO: 29.3 %
HGB BLD-MCNC: 9.6 G/DL
IMM GRANULOCYTES # BLD AUTO: 0.25 X10(3) UL (ref 0–1)
IMM GRANULOCYTES NFR BLD: 1.3 %
LYMPHOCYTES # BLD AUTO: 0.62 X10(3) UL (ref 1–4)
LYMPHOCYTES NFR BLD AUTO: 3.1 %
MCH RBC QN AUTO: 30.3 PG (ref 26–34)
MCHC RBC AUTO-ENTMCNC: 32.8 G/DL (ref 31–37)
MCV RBC AUTO: 92.4 FL
MONOCYTES # BLD AUTO: 0.96 X10(3) UL (ref 0.1–1)
MONOCYTES NFR BLD AUTO: 4.8 %
NEUTROPHILS # BLD AUTO: 18.1 X10 (3) UL (ref 1.5–7.7)
NEUTROPHILS # BLD AUTO: 18.1 X10(3) UL (ref 1.5–7.7)
NEUTROPHILS NFR BLD AUTO: 90.6 %
OSMOLALITY SERPL CALC.SUM OF ELEC: 308 MOSM/KG (ref 275–295)
PHOSPHATE SERPL-MCNC: 7.7 MG/DL (ref 2.4–5.1)
PLATELET # BLD AUTO: 372 10(3)UL (ref 150–450)
PLATELETS.RETICULATED NFR BLD AUTO: 5.6 % (ref 0–7)
POTASSIUM SERPL-SCNC: 4.8 MMOL/L (ref 3.5–5.1)
PROT SERPL-MCNC: 7.3 G/DL (ref 5.7–8.2)
RBC # BLD AUTO: 3.17 X10(6)UL
SODIUM SERPL-SCNC: 141 MMOL/L (ref 136–145)
WBC # BLD AUTO: 20 X10(3) UL (ref 4–11)

## 2024-10-10 PROCEDURE — 99233 SBSQ HOSP IP/OBS HIGH 50: CPT | Performed by: INTERNAL MEDICINE

## 2024-10-10 PROCEDURE — 93971 EXTREMITY STUDY: CPT

## 2024-10-10 RX ORDER — ALBUMIN (HUMAN) 12.5 G/50ML
25 SOLUTION INTRAVENOUS
Status: DISPENSED | OUTPATIENT
Start: 2024-10-10 | End: 2024-10-12

## 2024-10-10 NOTE — SLP NOTE
SPEECH DAILY NOTE - INPATIENT    ASSESSMENT & PLAN   ASSESSMENT      Proper PPE worn. Hands sanitized upon entrance/exit Pt room.         Pt alert, afebrile and on room air. Pt seen for swallow analysis per new order of Pt having difficulty swallowing meds and mildly-thick liquids- noted after dialysis.  Spouse present. Pt agreed to participate. Pt's preferred method of learning verbal. Pt placed  upright in bed; observed with current diet of bite size food/mildly-thick liquids. Swallowing precautions/strategies discussed as SLP fed oral trials. Functional bite reflex/mastication/lingual skill son bite size food. Minimal oral retention cleared with SLP directed liquid wash. Pharyngeal response appeared to trigger within 1-2 sec per hyolaryngeal elevation to completion (functional rise/strength per palpation). No overt CSA on bite size nor mildly-thick liquids via tsp. Cough elicited S/P first trial mildly-thick liquids via open cup. LOL improved as session progress. Rec bite size/mildly-thick liquids via tsp/no straw/pills crushed in pureed. Collaborated with RN regarding Pt's swallowing plan of care. No new CXR completed. Sp02 ~95% during this session. Call light within Pt's reach upon SLP discharge from room.      CXR 10/09/24:  CONCLUSION:    Mild cardiomegaly with interstitial pulmonary edema.    Bibasilar opacities are favored to represent subsegmental atelectasis due to low lung volumes.  Other superimposed process less likely.  Attention on follow-up is recommended.           PLAN:    To f/u x2-3 sessions to ensure safe intake of diet and reinforce swallowing/aspiration precautions. To monitor for new CXR results. Diet upgrade as tolerated. VFSS may be required for upgrade to thin liquids. Family education to be continued as available.       Diet Recommendations - Solids: Mechanical soft chopped/ Soft & Bite Sized  Diet Recommendations - Liquids: Nectar thick liquids/ Mildly thick    Compensatory Strategies  Recommended: Liquids via spoon  Aspiration Precautions: Upright position;Slow rate  Medication Administration Recommendations: Crushed in puree    Patient Experiencing Pain: No  Treatment Plan  Treatment Plan/Recommendations: Aspiration precautions    Interdisciplinary Communication: Discussed with RN  Plan posted at bedside    GOALS  Goal #1 The patient will tolerate bite sized consistency and mildly thick liquids without overt signs or symptoms of aspiration with 100 % accuracy over 1-2 session(s).    Now rec mildly-thick liquids VIA TSP.    Goal modified 10/10   Goal #2 The patient/family/caregiver will demonstrate understanding and implementation of aspiration precautions and swallow strategies independently over 1-2 session(s).    Swallowing precautions posted in room.     In Progress   Goal #3 The patient will tolerate trial upgrade of soft/hard solid consistency and thin liquids without overt signs or symptoms of aspiration with 100 % accuracy over 1-2 session(s).    No upgrade at this time. May need VFSS prior to upgrade to thin liquids.     In Progress   Goal #4 The patient will utilize compensatory strategies as outlined by  BSSE (clinical evaluation) including Slow rate, Small bites, Small sips, No straws, Upright 90 degrees, Eliminate distractions with PRN assistance 100 % of the time across 2 sessions.    SLP fed Pt.     In Progress   FOLLOW UP  Follow Up Needed (Documentation Required): Yes  SLP Follow-up Date: 10/11/24  Number of Visits to Meet Established Goals: 3      If you have any questions, please contact     Celeste Plaza M.S. [facility]/SLP  Speech-Language Pathologist  [facility]  #[phone]

## 2024-10-10 NOTE — PROGRESS NOTES
Critical Care Progress Note     Assessment / Plan:  Acute respiratory failure - due to pulmonary edema and/or aspiration. Intubated 9/29. CXR with interval improvement  - extubated 10/6  - s/p Zosyn x 5 days  - no stridor on exam this morning again  - bipap with all sleep  - cont IV steroids, will not taper today  - duonebs q6H  - volume management with HD  ESRD - hyponatremic on labs today  - HD per nephrology  KRAIG  - on CPAP at home, on bipap here as above  Asthma  - steroids as above  - Advair in lieu of Symbicort  CV: h/o pAFib  - S/p watchman 9/2024.   - Per cardiology.   Hip fracture after fall  - s/p L FRANCE 10/4  - increase activity with PT/OT  - per ortho.   Ppx  - subcu heparin  - PPI  Dispo  - will follow    Subjective:  Increased work of breathing, possible upper airway sounds, and oxygen requirements along with AMS led to patient being transferred back to the ICU yesterday. I saw him in the late afternoon while on HD at which point he was on RA and alert. Suspect volume.     Objective:  Vitals:    10/10/24 0000 10/10/24 0200 10/10/24 0400 10/10/24 0430   BP: (!) 131/107 (!) 159/91 (!) 184/67 134/57   BP Location: Left arm Left arm Left arm Left arm   Pulse: 74 76 77 77   Resp: 21 21 20 18   Temp: 97.4 °F (36.3 °C)  97.2 °F (36.2 °C)    TempSrc: Temporal  Temporal    SpO2: 100% 98% 98% 95%   Weight:       Height:         Physical Exam:  General - laying in bed on HD  Respiratory - clear bilaterally, no upper airway sounds  Cardiovascular - regular rate and rhythm  Abdo - soft, NTND  Ext - no LE edema, Left hip dressing not taken down  Mental status - interactive, answering questions appropriately. AOx2    Medications:  Reviewed in EMR    Lab Data:  Reviewed in EMR    Imaging:  I independently visualized all relevant chest imaging in PACS and agree with radiology interpretation except where noted.

## 2024-10-10 NOTE — PROGRESS NOTES
[facility]  part of [facility]    Progress Note    Ollie Hernández Patient Status:  Inpatient    1945 MRN [id number]   Location [facility] 2W/SW Attending Pranay Michel MD   Hosp Day # 12 PCP Wili Parmar MD     Chief Complaint:   Chief Complaint   Patient presents with    Fall       Subjective:   Ollie Hernández   Status post left hip arthroplasty on 10/4/2024.      Patient successfully extubated 10/6/2024.    Patient is struggling to breathe. Concern for aspiration.     Patient now on room air. Feeling a lot better.       Objective:   Objective:    Blood pressure 136/62, pulse 80, temperature 97.3 °F (36.3 °C), temperature source Temporal, resp. rate 20, height 5' 4.02\" (1.626 m), weight 144 lb 10 oz (65.6 kg), SpO2 96%.    Physical Exam:    General: Alert comfortable in no acute distress on 2 L nasal cannula  HEENT: Normocephalic, anicteric sclera   Neck: supple  Cardiac: Regular rate and rhythm. S1, S2 normal. No murmurs thrills or rubs  Lungs: Coarse breath sounds bilat  Abdomen: Soft, non-distended  Extremities: Without clubbing, cyanosis or edema.   Skin: Warm and dry.       Results:   Results:    Labs:  Recent Labs   Lab 10/04/24  0307 10/04/24  1411 10/05/24  0338 10/07/24  0407 10/08/24  0949 10/09/24  0643 10/10/24  0345   WBC 8.3  --   --  12.6* 21.2* 19.1* 20.0*   HGB 9.9*   < > 7.8* 7.9* 7.7* 7.9* 9.6*   MCV 92.4  --   --  92.2 91.2 93.8 92.4   .0  --   --  222.0 296.0 330.0 372.0    < > = values in this interval not displayed.       Recent Labs   Lab 10/07/24  0409 10/08/24  0957 10/09/24  0643 10/10/24  0345   * 176* 256* 189*   BUN 65* 61* 87* 43*   CREATSERUM 6.84* 5.86* 7.07* 4.59*   CA 10.0 10.4 9.9 9.9   ALB 3.6  --  3.6 3.9   * 134* 134* 141   K 4.6 4.7 4.7 4.8   CL 92* 96* 95* 100   CO2 22.0 26.0 25.0 27.0   ALKPHO  --   --  132* 130*   AST  --   --  53* 43*   ALT  --   --  <7* <7*   BILT  --   --  0.3 0.3   TP  --   --  6.8 7.3        Estimated Creatinine Clearance: 11.3 mL/min (A) (based on SCr of 4.59 mg/dL (H)).    No results for input(s): \"PTP\", \"INR\" in the last 168 hours.         Culture:  Hospital Encounter on 09/28/24   1. Blood Culture     Status: None    Collection Time: 09/30/24 10:03 AM    Specimen: Bld,Arterial Line; Blood   Result Value Ref Range    Blood Culture Result No Growth 5 Days N/A       Cardiac  No results for input(s): \"TROP\", \"PBNP\" in the last 168 hours.      Imaging: Imaging data reviewed in Epic.  US VENOUS DOPPLER LEG LEFT - DIAG IMG (CPT=93971)    Result Date: 10/10/2024  CONCLUSION: No evidence of left lower extremity DVT.    Dictated by (CST): Kevin Hensley MD on 10/10/2024 at 1:58 PM     Finalized by (CST): Kevin Hensley MD on 10/10/2024 at 1:59 PM          XR CHEST AP PORTABLE  (CPT=71045)    Result Date: 10/9/2024  CONCLUSION:   Mild cardiomegaly with interstitial pulmonary edema.   Bibasilar opacities are favored to represent subsegmental atelectasis due to low lung volumes.  Other superimposed process less likely.  Attention on follow-up is recommended.    Dictated by (CST): Apple Pierre MD on 10/09/2024 at 12:18 PM     Finalized by (CST): Apple Pierre MD on 10/09/2024 at 12:20 PM           Medications:    ipratropium-albuterol  3 mL Nebulization 4 times per day    methylPREDNISolone  40 mg Intravenous Q8H    clopidogrel  75 mg Oral Daily    carvedilol  25 mg Oral BID    sevelamer carbonate  800 mg Oral TID CC    insulin aspart  1-7 Units Subcutaneous TID CC and HS    atorvastatin  40 mg Oral Nightly    epoetin donna  5,000 Units Subcutaneous Once per day on Monday Wednesday Friday    heparin  5,000 Units Subcutaneous Q8H STEPHANIE    senna  10 mL Oral BID    docusate  100 mg Oral BID    mineral oil-white petrolatum  1 Application Both Eyes Nightly    lidocaine-menthol  2 patch Transdermal Daily    pantoprazole  40 mg Intravenous Q12H    cetirizine  5 mg Per NG Tube Daily     fluticasone-salmeterol  1 puff Inhalation BID         Assessment and Plan:   Assessment & Plan:      Acute hypoxic respiratory failure   Hypotension   Aspiration PNA  Pulmonary and cardiology on consult.   Secondary to pulmonary edema and aspiration  Intubated 9/29- extubated on 10/6/2024  Empiric zosyn.  Completed 5 days  Sputum cx normal julio c. Blood cx x 4 NGTD  Volume management per nephrology   CXR pulmonary edema. Improved alveolar opacities.   10/6: extubated  10/8: now on 2L O2NC, repeat CXR, SLP, PT , OT  10/9- now struggling to breathe. Will be transferred to the ICU. Needs emergent dialysis. Discussed with Pulm and nursing   10/10- feeling well monitor in the icu one more day         ESRD   HD per nephrology MW  Nephro on consult.   10/7: s/p HD         Hip fracture   S/p fall PTA -status post left hip arthroplasty on 10/4/2024  Orthopedic surgery on consult.   High risk for surgery  however surgery is necessary.   IV dilaudid panel, transition to po narcotics once passes swallow evaluation    Leukocytosis: Possibly reactive?  Has been uptrending in the last 48 hours, no clear evidence of new infectious source.  No fever  Chest x-ray from 10/7/2024:  Mildly improved right basilar opacity.   Hold off further antibiotics at this time just received Zosyn for 5 days.  Continue to monitor clinically and trend leukocytosis daily.      DM II   A1c   ISS     Paroxysmal A.fib - currently NSR   Acute on chronic HFpEF  CAD s/p jodi circ 5/24'   Elevate troponin secondary to demand ischemia   S/p watchman device 9/11/24  Cont coreg, statin  No further cardiac testing prior to planned surgery.   Resume ASA/Plavix once safely able to do so.     Coffee ground emesis   Resolved.   IV protonix BID   Hemoglobin downtrending postoperatively from 9.1-7.7.  Continue to monitor closely       >55min spent, >50% spent counseling and coordinating care in the form of educating pt/family and d/w consultants and staff. Most of the  time spent discussing the above plan.        Plan of care discussed with patient or family at bedside.    Ne Irizarry MD          Supplementary Documentation:     Quality:  DVT Prophylaxis: heparin   CODE status: Full  Dispo: per clinical course            Estimated date of discharge: TBD  Discharge is dependent on: clinical stability  At this point Mr. Hernández is expected to be discharge to: TBD

## 2024-10-10 NOTE — PLAN OF CARE
Pt remains alert and oriented x2-3 overnight. Pt on both Bipap and venturi mask overnight. PRN meds given, see mar. Pt updated on plan of care and all questions answered at this time.       Problem: Diabetes/Glucose Control  Goal: Glucose maintained within prescribed range  Description: INTERVENTIONS:  - Monitor Blood Glucose as ordered  - Assess for signs and symptoms of hyperglycemia and hypoglycemia  - Administer ordered medications to maintain glucose within target range  - Assess barriers to adequate nutritional intake and initiate nutrition consult as needed  - Instruct patient on self management of diabetes  Outcome: Progressing     Problem: PAIN - ADULT  Goal: Verbalizes/displays adequate comfort level or patient's stated pain goal  Description: INTERVENTIONS:  - Encourage pt to monitor pain and request assistance  - Assess pain using appropriate pain scale  - Administer analgesics based on type and severity of pain and evaluate response  - Implement non-pharmacological measures as appropriate and evaluate response  - Consider cultural and social influences on pain and pain management  - Manage/alleviate anxiety  - Utilize distraction and/or relaxation techniques  - Monitor for opioid side effects  - Notify MD/LIP if interventions unsuccessful or patient reports new pain  - Anticipate increased pain with activity and pre-medicate as appropriate  Outcome: Progressing     Problem: SAFETY ADULT - FALL  Goal: Free from fall injury  Description: INTERVENTIONS:  - Assess pt frequently for physical needs  - Identify cognitive and physical deficits and behaviors that affect risk of falls.  - [place] fall precautions as indicated by assessment.  - Educate pt/family on patient safety including physical limitations  - Instruct pt to call for assistance with activity based on assessment  - Modify environment to reduce risk of injury  - Provide assistive devices as appropriate  - Consider OT/PT consult to assist with  strengthening/mobility  - Encourage toileting schedule  Outcome: Progressing     Problem: DISCHARGE PLANNING  Goal: Discharge to home or other facility with appropriate resources  Description: INTERVENTIONS:  - Identify barriers to discharge w/pt and caregiver  - Include patient/family/discharge partner in discharge planning  - Arrange for needed discharge resources and transportation as appropriate  - Identify discharge learning needs (meds, wound care, etc)  - Arrange for interpreters to assist at discharge as needed  - Consider post-discharge preferences of patient/family/discharge partner  - Complete POLST form as appropriate  - Assess patient's ability to be responsible for managing their own health  - Refer to Case Management Department for coordinating discharge planning if the patient needs post-hospital services based on physician/LIP order or complex needs related to functional status, cognitive ability or social support system  Outcome: Progressing     Problem: CARDIOVASCULAR - ADULT  Goal: Maintains optimal cardiac output and hemodynamic stability  Description: INTERVENTIONS:  - Monitor vital signs, rhythm, and trends  - Monitor for bleeding, hypotension and signs of decreased cardiac output  - Evaluate effectiveness of vasoactive medications to optimize hemodynamic stability  - Monitor arterial and/or venous puncture sites for bleeding and/or hematoma  - Assess quality of pulses, skin color and temperature  - Assess for signs of decreased coronary artery perfusion - ex. Angina  - Evaluate fluid balance, assess for edema, trend weights  Outcome: Progressing  Goal: Absence of cardiac arrhythmias or at baseline  Description: INTERVENTIONS:  - Continuous cardiac monitoring, monitor vital signs, obtain 12 lead EKG if indicated  - Evaluate effectiveness of antiarrhythmic and heart rate control medications as ordered  - Initiate emergency measures for life threatening arrhythmias  - Monitor electrolytes and  administer replacement therapy as ordered  Outcome: Progressing     Problem: RESPIRATORY - ADULT  Goal: Achieves optimal ventilation and oxygenation  Description: INTERVENTIONS:  - Assess for changes in respiratory status  - Assess for changes in mentation and behavior  - Position to facilitate oxygenation and minimize respiratory effort  - Oxygen supplementation based on oxygen saturation or ABGs  - Provide Smoking Cessation handout, if applicable  - Encourage broncho-pulmonary hygiene including cough, deep breathe, Incentive Spirometry  - Assess the need for suctioning and perform as needed  - Assess and instruct to report SOB or any respiratory difficulty  - Respiratory Therapy support as indicated  - Manage/alleviate anxiety  - Monitor for signs/symptoms of CO2 retention  Outcome: Progressing     Problem: GASTROINTESTINAL - ADULT  Goal: Maintains or returns to baseline bowel function  Description: INTERVENTIONS:  - Assess bowel function  - Maintain adequate hydration with IV or PO as ordered and tolerated  - Evaluate effectiveness of GI medications  - Encourage mobilization and activity  - Obtain nutritional consult as needed  - Establish a toileting routine/schedule  - Consider collaborating with pharmacy to review patient's medication profile  Outcome: Progressing

## 2024-10-10 NOTE — PROGRESS NOTES
[facility]  part of [facility]    Progress Note      Subjective:     Sitting comfortably - off of NC. Improve sob. Family at bedside. S/p HD yesterday and tolerated well       Review of Systems:     Limited - denies any cp or sob. No abd.pain or NV    Objective:   Temp:  [97.2 °F (36.2 °C)-98.9 °F (37.2 °C)] 97.3 °F (36.3 °C)  Pulse:  [72-91] 81  Resp:  [16-25] 17  BP: (120-206)/() 161/72  SpO2:  [94 %-100 %] 95 %  FiO2 (%):  [26 %-30 %] 26 %  SpO2: 95 %     Intake/Output Summary (Last 24 hours) at 10/10/2024 1211  Last data filed at 10/9/2024 1700  Gross per 24 hour   Intake 30 ml   Output --   Net 30 ml     Wt Readings from Last 3 Encounters:   10/05/24 144 lb 10 oz (65.6 kg)   08/22/24 159 lb 12.8 oz (72.5 kg)   08/20/24 158 lb (71.7 kg)       General appearance: Alert and awake.  Oriented.  Currently on 2 L nasal cannula  Head: Normocephalic, atraumatic  Eyes: conjunctivae/corneas clear  Throat: lips, mucosa, and tongue normal; teeth and gums normal  Neck:  no JVD, supple  Extremities: extremities normal, no edema  Skin: No rashes or lesions  Neurologic: Grossly normal  Psychiatric: calm    Medications:  Current Facility-Administered Medications   Medication Dose Route Frequency    ipratropium-albuterol (Duoneb) 0.5-2.5 (3) MG/3ML inhalation solution 3 mL  3 mL Nebulization 4 times per day    methylPREDNISolone sodium succinate (Solu-MEDROL) injection 40 mg  40 mg Intravenous Q8H    clopidogrel (Plavix) tab 75 mg  75 mg Oral Daily    carvedilol (Coreg) tab 25 mg  25 mg Oral BID    sevelamer carbonate (Renvela) tab 800 mg  800 mg Oral TID CC    insulin aspart (NovoLOG) 100 Units/mL FlexPen 1-7 Units  1-7 Units Subcutaneous TID CC and HS    atorvastatin (Lipitor) tab 40 mg  40 mg Oral Nightly    sodium chloride 0.9 % IV bolus 100 mL  100 mL Intravenous Q30 Min PRN    And    albumin human (Albumin) 25% injection 25 g  25 g Intravenous PRN Dialysis    HYDROmorphone (Dilaudid) 1 MG/ML  injection 0.1 mg  0.1 mg Intravenous Q2H PRN    Or    HYDROmorphone (Dilaudid) 1 MG/ML injection 0.2 mg  0.2 mg Intravenous Q2H PRN    Or    HYDROmorphone (Dilaudid) 1 MG/ML injection 0.4 mg  0.4 mg Intravenous Q2H PRN    epoetin donna (Epogen, Procrit) 5,000 Units injection  5,000 Units Subcutaneous Once per day on Monday Wednesday Friday    metoclopramide (Reglan) 5 mg/mL injection 10 mg  10 mg Intravenous Q24H PRN    sodium chloride 0.9 % irrigation    Continuous PRN    sodium chloride 0.9 % irrigation    Continuous PRN    ondansetron (Zofran) 4 MG/2ML injection 4 mg  4 mg Intravenous Q6H PRN    diphenhydrAMINE (Benadryl) cap/tab 25 mg  25 mg Oral Q4H PRN    Or    diphenhydrAMINE (Benadryl) 50 mg/mL  injection 12.5 mg  12.5 mg Intravenous Q4H PRN    heparin (Porcine) 5000 UNIT/ML injection 5,000 Units  5,000 Units Subcutaneous Q8H STEPHANIE    dextrose 10% infusion (TPN no rate)   Intravenous Continuous PRN    pancrelipase (Lip-Prot-Amyl) (Zenpep) DR particles cap 10,000 Units  10,000 Units Per G Tube PRN    And    sodium bicarbonate tab 325 mg  325 mg Oral PRN    senna (Senokot) 8.8 MG/5ML oral syrup 17.6 mg  10 mL Oral BID    docusate (Colace) 50 MG/5ML oral solution 100 mg  100 mg Oral BID    mineral oil-white petrolatum (Artificial Tears) 83-15 % ophthalmic ointment 1 Application  1 Application Both Eyes Nightly    lidocaine-menthol 4-1 % patch 2 patch  2 patch Transdermal Daily    pantoprazole (Protonix) 40 mg in sodium chloride 0.9% PF 10 mL IV push  40 mg Intravenous Q12H    hydrALAzine (Apresoline) 20 mg/mL injection 10 mg  10 mg Intravenous Q6H PRN    labetalol (Trandate) 5 mg/mL injection 10 mg  10 mg Intravenous Q4H PRN    cetirizine (ZyrTEC) tab 5 mg  5 mg Per NG Tube Daily    acetaminophen (Tylenol) 160 MG/5ML oral liquid 500 mg  500 mg Per NG Tube Daily PRN    acetaminophen (Tylenol) tab 650 mg  650 mg Oral Q4H PRN    Or    acetaminophen (Tylenol) 160 MG/5ML oral liquid 650 mg  650 mg Per NG Tube Q4H PRN     Or    acetaminophen (Tylenol) rectal suppository 650 mg  650 mg Rectal Q4H PRN    polyethylene glycol (PEG 3350) (Miralax) 17 g oral packet 17 g  17 g Per NG Tube Daily PRN    heparin (Porcine) 1000 UNIT/ML injection 1,500 Units  1.5 mL Intravenous PRN Dialysis    lidocaine-prilocaine (Emla) 2.5-2.5 % cream   Topical PRN    bisacodyl (Dulcolax) 10 MG rectal suppository 10 mg  10 mg Rectal Daily PRN    albuterol (Ventolin HFA) 108 (90 Base) MCG/ACT inhaler 2 puff  2 puff Inhalation Q4H PRN    fluticasone propionate (Flonase) 50 MCG/ACT nasal suspension 2 spray  2 spray Nasal Daily PRN    glucose (Dex4) 15 GM/59ML oral liquid 15 g  15 g Oral Q15 Min PRN    Or    glucose (Glutose) 40% oral gel 15 g  15 g Oral Q15 Min PRN    Or    glucose-vitamin C (Dex-4) chewable tab 4 tablet  4 tablet Oral Q15 Min PRN    Or    dextrose 50% injection 50 mL  50 mL Intravenous Q15 Min PRN    Or    glucose (Dex4) 15 GM/59ML oral liquid 30 g  30 g Oral Q15 Min PRN    Or    glucose (Glutose) 40% oral gel 30 g  30 g Oral Q15 Min PRN    Or    glucose-vitamin C (Dex-4) chewable tab 8 tablet  8 tablet Oral Q15 Min PRN    fluticasone-salmeterol (Advair Diskus) 250-50 MCG/ACT inhaler 1 puff  1 puff Inhalation BID              Results:     Recent Labs   Lab 10/07/24  0407 10/08/24  0949 10/09/24  0643 10/10/24  0345   RBC 2.68* 2.51* 2.57* 3.17*   HGB 7.9* 7.7* 7.9* 9.6*   HCT 24.7* 22.9* 24.1* 29.3*   MCV 92.2 91.2 93.8 92.4   NEPRELIM 11.71*  --  17.90* 18.10*   WBC 12.6* 21.2* 19.1* 20.0*   .0 296.0 330.0 372.0     Recent Labs   Lab 10/07/24  0409 10/08/24  0957 10/09/24  0643 10/10/24  0345   * 176* 256* 189*   BUN 65* 61* 87* 43*   CREATSERUM 6.84* 5.86* 7.07* 4.59*   CA 10.0 10.4 9.9 9.9   ALB 3.6  --  3.6 3.9   * 134* 134* 141   K 4.6 4.7 4.7 4.8   CL 92* 96* 95* 100   CO2 22.0 26.0 25.0 27.0   ALKPHO  --   --  132* 130*   AST  --   --  53* 43*   ALT  --   --  <7* <7*   BILT  --   --  0.3 0.3   TP  --   --  6.8 7.3      PTT   Date Value Ref Range Status   08/09/2024 30.1 23.0 - 36.0 seconds Final     INR   Date Value Ref Range Status   08/09/2024 1.45 (H) 0.80 - 1.20 Final     Comment:     Therapeutic INR range for patients on warfarin:  2.0-3.0 for most medical conditions and surgical prophylaxis   2.5-3.5 for mechanical heart valves and recurrent thromboembolism    Direct thrombin inhibitors (e.g. argatroban, bivalirudin), factor Xa inhibitors (e.g. apixaban, rivaroxaban), and conditions such as coagulation factor deficiency, vitamin K deficiency, and liver disease will prolong the prothrombin time/INR.    Unfractionated heparin and low molecular weight heparin do not affect the prothrombin time/INR up to a concentration of 1 IU/mL and 1.5 IU/mL, respectively.         No results for input(s): \"BNP\" in the last 168 hours.  Recent Labs   Lab 10/07/24  0409 10/08/24  0957   PHOS 9.6* 8.6*        Recent Labs   Lab 10/07/24  0409 10/08/24  0957 10/09/24  0643 10/10/24  0345   PHOS 9.6* 8.6*  --   --    ALB 3.6  --  3.6 3.9       XR CHEST AP PORTABLE  (CPT=71045)    Result Date: 10/9/2024  CONCLUSION:   Mild cardiomegaly with interstitial pulmonary edema.   Bibasilar opacities are favored to represent subsegmental atelectasis due to low lung volumes.  Other superimposed process less likely.  Attention on follow-up is recommended.    Dictated by (CST): Apple Pierre MD on 10/09/2024 at 12:18 PM     Finalized by (CST): Apple Pierre MD on 10/09/2024 at 12:20 PM               Assessment and Plan:       79 year old male with past medical history of T2DM, HTN, HLD, ESRD on HD MWF, CAD s/p PCI (5/2024), A.fib on Eliquis, HFpEF, KRAIG, BPH, history of recurrent gastrointestinal bleeding presented to the ER after a fall sustaining a left hip fracture    1.ESRD: HD Monday Wednesday Friday  HD yesterday with 3 liters UF - no need for any UF today - looks comfortable. Next HD tomm  hyperphosphatemia: Low phos diet. Next check tomm.  Started on Renvela-titrate dose per level  Hyponatremia-improved sodium postdialysis at 134.  Fluid restriction 1.5 L    2.anemia: Decline in hemoglobin noted  Patient with history of recurrent GI bleed.  S/p EGD and colonoscopy in July 2024  Hemoglobin 10.5->9.6.  stable.   Plavix resumed  On IV Protonix twice daily  On Epogen-s/p IV iron    3.coronary artery disease status post PCI in May 2024/paroxysmal A-fib status post Watchman device  Cardiology input noted    4.acute respiratory failure: Presumably secondary to aspiration/pulmonary edema  S/p extubation 10/6.  Currently on 2 L nasal cannula and increase WOB - started on BIPAP and transfer to unit  Off of oxygen today - next HD tomm    5.left hip fracture: S/p left total hip arthroplasty on 10/4    Discussed with nursing and family at bedside     Walker Sheppard MD  10/10/2024
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
## 2024-10-10 NOTE — PROGRESS NOTES
[facility]  part of [facility]    Progress Note    Ollie Hernández Patient Status:  Inpatient    1945 MRN [id number]   Location [facility] 2W/SW Attending Ne Irizarry MD   Hosp Day # 12 PCP Wili Parmar MD         Subjective:   Subjective:  Patient weak, seated in bedside chair.  Multiple family members present.  Patient able to nod yes/no, but was unable to speak.  patient nodded yes when asked if he was doing well and pain was improved.  He shook his head no when asked if he had any numbness or tingling.  Patient was readmitted to ICU w for difficulty with breathing.  Hemoglobin 9.6.    Objective:   Blood pressure 134/57, pulse 77, temperature 97.2 °F (36.2 °C), temperature source Temporal, resp. rate 18, height 5' 4.02\" (1.626 m), weight 144 lb 10 oz (65.6 kg), SpO2 95%.  Physical Exam  Wound VAC on, clean and dry.  No asymmetric swelling to left leg.  Lower extremity skin healthy, no pitting edema.  Left calf tender to palpation.  Able to dorsiflex and plantarflex bilateral feet.  Able to do slight  straight leg raise with left leg.  Denies numbness or tingling.    Results:   Lab Results   Component Value Date    WBC 20.0 (H) 10/10/2024    HGB 9.6 (L) 10/10/2024    HCT 29.3 (L) 10/10/2024    .0 10/10/2024    CREATSERUM 4.59 (H) 10/10/2024    BUN 43 (H) 10/10/2024     10/10/2024    K 4.8 10/10/2024     10/10/2024    CO2 27.0 10/10/2024     (H) 10/10/2024    CA 9.9 10/10/2024    ALB 3.9 10/10/2024    ALKPHO 130 (H) 10/10/2024    BILT 0.3 10/10/2024    TP 7.3 10/10/2024    AST 43 (H) 10/10/2024    ALT <7 (L) 10/10/2024    PTT 30.1 2024    INR 1.45 (H) 2024    T4F 0.9 2022    TSH 2.844 2024     (H) 2024    PSA 2.94 10/20/2021    DDIMER 6.07 (H) 2024    ESRML 10 2024    CRP 1.30 (H) 2024    MG 2.2 10/02/2024    PHOS 8.6 (H) 10/08/2024    TROP <0.045 2019    TROPHS 56 (HH) 2024      08/05/2023    B12 672 07/04/2024       XR CHEST AP PORTABLE  (CPT=71045)    Result Date: 10/9/2024  CONCLUSION:   Mild cardiomegaly with interstitial pulmonary edema.   Bibasilar opacities are favored to represent subsegmental atelectasis due to low lung volumes.  Other superimposed process less likely.  Attention on follow-up is recommended.    Dictated by (CST): Apple Pierre MD on 10/09/2024 at 12:18 PM     Finalized by (CST): Apple Pierre MD on 10/09/2024 at 12:20 PM               Assessment & Plan:     Closed displaced midcervical fracture of left femur with delayed healing  Patient postop day #6 left anterior hip replacement for displaced femoral neck fracture.  Overall patient doing well.  Discussed with both the patient and his family that he will continue working with physical therapy for the remainder of his stay.  He can continue being weightbearing as tolerated with assistance.  We will order a left calf venous ultrasound to rule out DVT.  I also discussed with the nurses wound care moving forward.  On day #7, which will be Friday, 10/11/2024, they can remove the wound VAC dressing, paint the wound with Betadine, and place Mepilex dressing on the wound.      ESRD (end stage renal disease) (HCC)  Per renal      Closed fracture of left hip (HCC)  As above      Hyperphosphatemia  Per medicine and renal      Respiratory failure (HCC)  Per medicine and pulmonary      Anemia  Stable              SOFÍA Delgado  10/10/2024

## 2024-10-10 NOTE — PLAN OF CARE
Problem: Patient Centered Care  Goal: Patient preferences are identified and integrated in the patient's plan of care  Description: Interventions:  - What would you like us to know as we care for you?   - Provide timely, complete, and accurate information to patient/family  - Incorporate patient and family knowledge, values, beliefs, and cultural backgrounds into the planning and delivery of care  - Encourage patient/family to participate in care and decision-making at the level they choose  - Honor patient and family perspectives and choices  Outcome: Progressing     Problem: Diabetes/Glucose Control  Goal: Glucose maintained within prescribed range  Description: INTERVENTIONS:  - Monitor Blood Glucose as ordered  - Assess for signs and symptoms of hyperglycemia and hypoglycemia  - Administer ordered medications to maintain glucose within target range  - Assess barriers to adequate nutritional intake and initiate nutrition consult as needed  - Instruct patient on self management of diabetes  Outcome: Progressing     Problem: Patient/Family Goals  Goal: Patient/Family Long Term Goal  Description: Patient's Long Term Goal:    Interventions:  - See additional Care Plan goals for specific interventions  Outcome: Progressing  Goal: Patient/Family Short Term Goal  Description: Patient's Short Term Goal:     Interventions:   - See additional Care Plan goals for specific interventions  Outcome: Progressing     Problem: PAIN - ADULT  Goal: Verbalizes/displays adequate comfort level or patient's stated pain goal  Description: INTERVENTIONS:  - Encourage pt to monitor pain and request assistance  - Assess pain using appropriate pain scale  - Administer analgesics based on type and severity of pain and evaluate response  - Implement non-pharmacological measures as appropriate and evaluate response  - Consider cultural and social influences on pain and pain management  - Manage/alleviate anxiety  - Utilize distraction and/or  relaxation techniques  - Monitor for opioid side effects  - Notify MD/LIP if interventions unsuccessful or patient reports new pain  - Anticipate increased pain with activity and pre-medicate as appropriate  Outcome: Progressing     Problem: SAFETY ADULT - FALL  Goal: Free from fall injury  Description: INTERVENTIONS:  - Assess pt frequently for physical needs  - Identify cognitive and physical deficits and behaviors that affect risk of falls.  - [place] fall precautions as indicated by assessment.  - Educate pt/family on patient safety including physical limitations  - Instruct pt to call for assistance with activity based on assessment  - Modify environment to reduce risk of injury  - Provide assistive devices as appropriate  - Consider OT/PT consult to assist with strengthening/mobility  - Encourage toileting schedule  Outcome: Progressing     Problem: DISCHARGE PLANNING  Goal: Discharge to home or other facility with appropriate resources  Description: INTERVENTIONS:  - Identify barriers to discharge w/pt and caregiver  - Include patient/family/discharge partner in discharge planning  - Arrange for needed discharge resources and transportation as appropriate  - Identify discharge learning needs (meds, wound care, etc)  - Arrange for interpreters to assist at discharge as needed  - Consider post-discharge preferences of patient/family/discharge partner  - Complete POLST form as appropriate  - Assess patient's ability to be responsible for managing their own health  - Refer to Case Management Department for coordinating discharge planning if the patient needs post-hospital services based on physician/LIP order or complex needs related to functional status, cognitive ability or social support system  Outcome: Progressing     Problem: CARDIOVASCULAR - ADULT  Goal: Maintains optimal cardiac output and hemodynamic stability  Description: INTERVENTIONS:  - Monitor vital signs, rhythm, and trends  - Monitor for  bleeding, hypotension and signs of decreased cardiac output  - Evaluate effectiveness of vasoactive medications to optimize hemodynamic stability  - Monitor arterial and/or venous puncture sites for bleeding and/or hematoma  - Assess quality of pulses, skin color and temperature  - Assess for signs of decreased coronary artery perfusion - ex. Angina  - Evaluate fluid balance, assess for edema, trend weights  Outcome: Progressing  Goal: Absence of cardiac arrhythmias or at baseline  Description: INTERVENTIONS:  - Continuous cardiac monitoring, monitor vital signs, obtain 12 lead EKG if indicated  - Evaluate effectiveness of antiarrhythmic and heart rate control medications as ordered  - Initiate emergency measures for life threatening arrhythmias  - Monitor electrolytes and administer replacement therapy as ordered  Outcome: Progressing     Problem: RESPIRATORY - ADULT  Goal: Achieves optimal ventilation and oxygenation  Description: INTERVENTIONS:  - Assess for changes in respiratory status  - Assess for changes in mentation and behavior  - Position to facilitate oxygenation and minimize respiratory effort  - Oxygen supplementation based on oxygen saturation or ABGs  - Provide Smoking Cessation handout, if applicable  - Encourage broncho-pulmonary hygiene including cough, deep breathe, Incentive Spirometry  - Assess the need for suctioning and perform as needed  - Assess and instruct to report SOB or any respiratory difficulty  - Respiratory Therapy support as indicated  - Manage/alleviate anxiety  - Monitor for signs/symptoms of CO2 retention  Outcome: Progressing     Problem: GASTROINTESTINAL - ADULT  Goal: Maintains or returns to baseline bowel function  Description: INTERVENTIONS:  - Assess bowel function  - Maintain adequate hydration with IV or PO as ordered and tolerated  - Evaluate effectiveness of GI medications  - Encourage mobilization and activity  - Obtain nutritional consult as needed  - Establish a  toileting routine/schedule  - Consider collaborating with pharmacy to review patient's medication profile  Outcome: Progressing

## 2024-10-10 NOTE — OCCUPATIONAL THERAPY NOTE
OCCUPATIONAL THERAPY TREATMENT NOTE - INPATIENT    Room Number: 225/225-A     Presenting Problem: Closed L femur fracture; s/p L anterior replacement; WBAT, anterior hip precautions     Problem List  Principal Problem:    Closed displaced midcervical fracture of left femur with delayed healing  Active Problems:    ESRD (end stage renal disease) ([facility])    Closed fracture of left hip ([facility])    Hyperphosphatemia    Respiratory failure ([facility])    Anemia      OCCUPATIONAL THERAPY ASSESSMENT   Patient demonstrates fair progress this session, goals remain in progress.  Patient continues to benefit from continued skilled OT services: to promote return to prior level of function and safety with continuous assistance and gradual rehabilitative therapy.     Patient presents in bed; appears frustrated and agitated with therapists at times but easily redirected back to task; supportive spouse present and assisted with redirecting patient; provided Mod A x2 for bed mobiltiy to EOB; upon sitting up, pt with R lateral lean and posterior lean requiring Mod A from OT to correct at trunk; pt did not elicit any protective responses or righting reactions when LOB occurred despite cues and physical assist; pt tolerating <8 minutes at EOB and progressed to close CGA/SBA for sitting balance; completed a SPT Mod A x2 to bedside chair, after resting, pt was able to stand with Mod A x2; demo'd lateral weight shifting with Min-Mod A and was able ot progress to straight path mobility <8 feet with chair follow; provided MAX cues for sequencing steps and managing RW; pt returned to chair and left up; stable at exit and all needs in reach; Continue skilled occupational therapy while IP to maximize patient function and increase patient participation, safety, and independence with basic ADL and everyday activities.       PLAN DURING HOSPITALIZATION  OT Device Recommendations: TBD  OT Treatment Plan: Balance activities;Energy conservation/work  simplification techniques;ADL training;Functional transfer training;Endurance training;Patient/Family training;Patient/Family education;Compensatory technique education     SUBJECTIVE  Wipe that frown off your face  (OT was wearing a mask)    OBJECTIVE  Precautions: Limb alert - left;WENDY - anterior    WEIGHT BEARING RESTRICTION  L Lower Extremity: Weight Bearing as Tolerated    PAIN ASSESSMENT  Rating: 3  Location: hip  Management Techniques: Activity promotion    ACTIVITY TOLERANCE  Pulse: 80        BP: 143/77  BP Location: Left arm  BP Method: Automatic  Patient Position: Sitting    O2 SATURATIONS  Oxygen Therapy  SPO2% on Oxygen at Rest: 100  SPO2% Ambulation on Oxygen: 94    ACTIVITIES OF DAILY LIVING ASSESSMENT  AM-PAC ‘6-Clicks’ Inpatient Daily Activity Short Form  How much help from another person does the patient currently need…  -   Putting on and taking off regular lower body clothing?: A Lot  -   Bathing (including washing, rinsing, drying)?: A Lot  -   Toileting, which includes using toilet, bedpan or urinal? : A Lot  -   Putting on and taking off regular upper body clothing?: A Lot  -   Taking care of personal grooming such as brushing teeth?: A Little  -   Eating meals?: A Little    AM-PAC Score:  Score: 14  Approx Degree of Impairment: 59.67%  Standardized Score (AM-PAC Scale): 33.39  CMS Modifier (G-Code): CK    FUNCTIONAL TRANSFER ASSESSMENT  Sit to Stand: Edge of Bed  Edge of Bed: Moderate Assist (x2)    BED MOBILITY  Rolling: Moderate Assist  Supine to Sit : Moderate Assist  Scooting: Mod a    BALANCE ASSESSMENT  Static Sitting: Minimal Assist  Static Standing: Moderate Assist    FUNCTIONAL ADL ASSESSMENT  Eating: Supervision  Grooming Seated: Minimal Assist  Bathing Seated: Maximum Assist  UB Dressing Seated: Minimal Assist  LB Dressing Seated: Maximum Assist  Toileting Seated: Maximum Assist    EDUCATION PROVIDED  Patient Education : Role of Occupational Therapy; Discharge Recommendations; Plan  of Care  Patient's Response to Education: Verbalized Understanding    The patient's Approx Degree of Impairment: 59.67% has been calculated based on documentation in the [facility] '6 clicks' Inpatient Daily Activity Short Form.  Research supports that patients with this level of impairment may benefit from GR.  Final disposition will be made by interdisciplinary medical team.    Patient End of Session: Up in chair;Needs met;Call light within reach;RN aware of session/findings;All patient questions and concerns addressed    OT Goals:      OT Goals  Patient self-stated goal is: to return home     Patient will complete LE dressing with SBA   Comment: ongoing    Patient will complete toilet transfer with SBA   Comment: ongoing    Patient will complete self care task at sink level with SBA    Comment:ongoing     Comment:         Goals  on:   Frequency:3-5x week    OT Session Time: 25 minutes  Self-Care Home Management: 0 minutes  Therapeutic Activity: 25 minutes

## 2024-10-10 NOTE — PROGRESS NOTES
[facility]      [facility] Cardiology Progress Note    Ollie Hernández Patient Status:  Inpatient    1945 MRN [id number]   Location [facility] 2W/SW Attending Pranay Michel MD   Hosp Day # 12 PCP Wili Parmar MD       Impression/Plan:  79 year old male presenting with:    Impression:  Hip fracture Left - S/P L anterior total hip arthroplasty 10/4/24  Acute resp failure, aspiration and/or pulmonary edema; intubated 2024 extubated 10/6  -Failed weaning trial yesterday  ESRD on HD  DM  PAF, currently SR. S/p watchman device 24  HTN  HL, on statin PTA  Acute on chronic diastolic CHF  CAD s/p Fenton circ 24   Coffee grounds in OG tube  -Slight decline in Hb post-op  Elevated troponin, likely demand ischemia    Plan:  - Cont carvedilol for rate control BP. Follow BP as restarts PRN labetalol    - Cont statin  - Asa/plavix on hold (recent Watchman implant 2024); Recheck Hb in Am - resume once safely able and taking PO   - Plavix restarted 10/8 Hb stable taking PO  monitor for bleeding   - HD as per nephrology  - Vent management as per pulm.    - Monitor on tele  - Reviewed w/ family at bedside         Subjective:     Transferred back to unit for dyspnea last nite   Still confused.   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)    Atherosclerosis of native arteries of extremities with intermittent  claudication, bilateral legs (HCC)    Primary hypertension    Smokers' cough (HCC)    Unstable angina (HCC)    Lower GI bleed    ESRD (end stage renal disease) on dialysis (HCC)    PAF (paroxysmal atrial fibrillation) (HCC)    AVM (arteriovenous malformation) of colon    ABLA (acute blood loss anemia)    Rectal bleeding    Anticoagulated    Antiplatelet or antithrombotic long-term use    Lower GI bleeding    ESRD on hemodialysis (HCC)    GI bleed    Closed displaced midcervical fracture of left femur with delayed healing    ESRD (end stage renal disease) ([facility])    Closed fracture of left hip ([facility])    Hyperphosphatemia    Respiratory failure (HCC)    Anemia       Objective:   Temp: 97.3 °F (36.3 °C)  Pulse: 81  Resp: 17  BP: 161/72  FiO2 (%): 26 %    Intake/Output:     Intake/Output Summary (Last 24 hours) at 10/10/2024 1339  Last data filed at 10/9/2024 1700  Gross per 24 hour   Intake 30 ml   Output --   Net 30 ml       Last 3 Weights   10/05/24 0600 144 lb 10 oz (65.6 kg)   10/04/24 0600 145 lb 8.1 oz (66 kg)   10/02/24 1310 156 lb 4.9 oz (70.9 kg)   10/02/24 0800 143 lb 11.8 oz (65.2 kg)   10/01/24 0600 160 lb 0.9 oz (72.6 kg)   09/30/24 0400 164 lb 0.4 oz (74.4 kg)   09/28/24 1649 155 lb 11.2 oz (70.6 kg)   08/22/24 1311 159 lb 12.8 oz (72.5 kg)   08/20/24 0933 158 lb (71.7 kg)       Tele: NSR PAC' s    Physical Exam:    General: Awake   HEENT: Normocephalic, anicteric sclera NG tube out   Neck: supple  Cardiac: Regular rate and rhythm. S1, S2 normal. No murmur, pericardial rub, S3, thrill, heave or extra cardiac sounds.  Lungs: Coarse breath sounds bilat  Abdomen: Soft, non-distended  Extremities: Without clubbing, cyanosis or edema.  Peripheral pulses are 2+. SCD boots   Neurologic: Alert+confused   Skin: Warm and dry.     Laboratory/Data:    Labs:         Recent Labs   Lab 10/04/24  0307 10/04/24  1411 10/05/24  0338 10/07/24  0407 10/08/24  0949 10/09/24  0643 10/10/24  0345   WBC 8.3  --   --  12.6* 21.2*  19.1* 20.0*   HGB 9.9*   < > 7.8* 7.9* 7.7* 7.9* 9.6*   MCV 92.4  --   --  92.2 91.2 93.8 92.4   .0  --   --  222.0 296.0 330.0 372.0    < > = values in this interval not displayed.       Recent Labs   Lab 10/05/24  0338 10/07/24  0409 10/08/24  0957 10/09/24  0643 10/10/24  0345   * 126* 134* 134* 141   K 3.3* 4.6 4.7 4.7 4.8   CL 99 92* 96* 95* 100   CO2 26.0 22.0 26.0 25.0 27.0   BUN 39* 65* 61* 87* 43*   CREATSERUM 4.01* 6.84* 5.86* 7.07* 4.59*   CA 8.7 10.0 10.4 9.9 9.9   PHOS  --  9.6* 8.6*  --   --    * 311* 176* 256* 189*       Recent Labs   Lab 10/07/24  0409 10/09/24  0643 10/10/24  0345   ALT  --  <7* <7*   AST  --  53* 43*   ALB 3.6 3.6 3.9       No results for input(s): \"TROP\" in the last 168 hours.    Allergies:   Allergies   Allergen Reactions    Adhesive Tape OTHER (SEE COMMENTS)     Severe rashes    Dust Mite Extract RASH       Medications:  Current Facility-Administered Medications   Medication Dose Route Frequency    sodium chloride 0.9 % IV bolus 100 mL  100 mL Intravenous Q30 Min PRN    And    albumin human (Albumin) 25% injection 25 g  25 g Intravenous PRN Dialysis    ipratropium-albuterol (Duoneb) 0.5-2.5 (3) MG/3ML inhalation solution 3 mL  3 mL Nebulization 4 times per day    methylPREDNISolone sodium succinate (Solu-MEDROL) injection 40 mg  40 mg Intravenous Q8H    clopidogrel (Plavix) tab 75 mg  75 mg Oral Daily    carvedilol (Coreg) tab 25 mg  25 mg Oral BID    sevelamer carbonate (Renvela) tab 800 mg  800 mg Oral TID CC    insulin aspart (NovoLOG) 100 Units/mL FlexPen 1-7 Units  1-7 Units Subcutaneous TID CC and HS    atorvastatin (Lipitor) tab 40 mg  40 mg Oral Nightly    HYDROmorphone (Dilaudid) 1 MG/ML injection 0.1 mg  0.1 mg Intravenous Q2H PRN    Or    HYDROmorphone (Dilaudid) 1 MG/ML injection 0.2 mg  0.2 mg Intravenous Q2H PRN    Or    HYDROmorphone (Dilaudid) 1 MG/ML injection 0.4 mg  0.4 mg Intravenous Q2H PRN    epoetin donna (Epogen, Procrit) 5,000 Units  injection  5,000 Units Subcutaneous Once per day on Monday Wednesday Friday    metoclopramide (Reglan) 5 mg/mL injection 10 mg  10 mg Intravenous Q24H PRN    sodium chloride 0.9 % irrigation    Continuous PRN    sodium chloride 0.9 % irrigation    Continuous PRN    ondansetron (Zofran) 4 MG/2ML injection 4 mg  4 mg Intravenous Q6H PRN    diphenhydrAMINE (Benadryl) cap/tab 25 mg  25 mg Oral Q4H PRN    Or    diphenhydrAMINE (Benadryl) 50 mg/mL  injection 12.5 mg  12.5 mg Intravenous Q4H PRN    heparin (Porcine) 5000 UNIT/ML injection 5,000 Units  5,000 Units Subcutaneous Q8H STEPHANIE    dextrose 10% infusion (TPN no rate)   Intravenous Continuous PRN    pancrelipase (Lip-Prot-Amyl) (Zenpep) DR particles cap 10,000 Units  10,000 Units Per G Tube PRN    And    sodium bicarbonate tab 325 mg  325 mg Oral PRN    senna (Senokot) 8.8 MG/5ML oral syrup 17.6 mg  10 mL Oral BID    docusate (Colace) 50 MG/5ML oral solution 100 mg  100 mg Oral BID    mineral oil-white petrolatum (Artificial Tears) 83-15 % ophthalmic ointment 1 Application  1 Application Both Eyes Nightly    lidocaine-menthol 4-1 % patch 2 patch  2 patch Transdermal Daily    pantoprazole (Protonix) 40 mg in sodium chloride 0.9% PF 10 mL IV push  40 mg Intravenous Q12H    hydrALAzine (Apresoline) 20 mg/mL injection 10 mg  10 mg Intravenous Q6H PRN    labetalol (Trandate) 5 mg/mL injection 10 mg  10 mg Intravenous Q4H PRN    cetirizine (ZyrTEC) tab 5 mg  5 mg Per NG Tube Daily    acetaminophen (Tylenol) 160 MG/5ML oral liquid 500 mg  500 mg Per NG Tube Daily PRN    acetaminophen (Tylenol) tab 650 mg  650 mg Oral Q4H PRN    Or    acetaminophen (Tylenol) 160 MG/5ML oral liquid 650 mg  650 mg Per NG Tube Q4H PRN    Or    acetaminophen (Tylenol) rectal suppository 650 mg  650 mg Rectal Q4H PRN    polyethylene glycol (PEG 3350) (Miralax) 17 g oral packet 17 g  17 g Per NG Tube Daily PRN    heparin (Porcine) 1000 UNIT/ML injection 1,500 Units  1.5 mL Intravenous PRN Dialysis     lidocaine-prilocaine (Emla) 2.5-2.5 % cream   Topical PRN    bisacodyl (Dulcolax) 10 MG rectal suppository 10 mg  10 mg Rectal Daily PRN    albuterol (Ventolin HFA) 108 (90 Base) MCG/ACT inhaler 2 puff  2 puff Inhalation Q4H PRN    fluticasone propionate (Flonase) 50 MCG/ACT nasal suspension 2 spray  2 spray Nasal Daily PRN    glucose (Dex4) 15 GM/59ML oral liquid 15 g  15 g Oral Q15 Min PRN    Or    glucose (Glutose) 40% oral gel 15 g  15 g Oral Q15 Min PRN    Or    glucose-vitamin C (Dex-4) chewable tab 4 tablet  4 tablet Oral Q15 Min PRN    Or    dextrose 50% injection 50 mL  50 mL Intravenous Q15 Min PRN    Or    glucose (Dex4) 15 GM/59ML oral liquid 30 g  30 g Oral Q15 Min PRN    Or    glucose (Glutose) 40% oral gel 30 g  30 g Oral Q15 Min PRN    Or    glucose-vitamin C (Dex-4) chewable tab 8 tablet  8 tablet Oral Q15 Min PRN    fluticasone-salmeterol (Advair Diskus) 250-50 MCG/ACT inhaler 1 puff  1 puff Inhalation BID

## 2024-10-11 LAB
ALBUMIN SERPL-MCNC: 3.5 G/DL (ref 3.2–4.8)
ALBUMIN/GLOB SERPL: 1.1 {RATIO} (ref 1–2)
ALP LIVER SERPL-CCNC: 115 U/L
ALT SERPL-CCNC: <7 U/L
ANION GAP SERPL CALC-SCNC: 11 MMOL/L (ref 0–18)
AST SERPL-CCNC: 29 U/L (ref ?–34)
BASOPHILS # BLD AUTO: 0.02 X10(3) UL (ref 0–0.2)
BASOPHILS NFR BLD AUTO: 0.1 %
BILIRUB SERPL-MCNC: 0.3 MG/DL (ref 0.2–1.1)
BUN BLD-MCNC: 86 MG/DL (ref 9–23)
BUN/CREAT SERPL: 12.4 (ref 10–20)
CALCIUM BLD-MCNC: 9.9 MG/DL (ref 8.7–10.4)
CHLORIDE SERPL-SCNC: 103 MMOL/L (ref 98–112)
CO2 SERPL-SCNC: 31 MMOL/L (ref 21–32)
CREAT BLD-MCNC: 6.95 MG/DL
DEPRECATED RDW RBC AUTO: 54.2 FL (ref 35.1–46.3)
EGFRCR SERPLBLD CKD-EPI 2021: 8 ML/MIN/1.73M2 (ref 60–?)
EOSINOPHIL # BLD AUTO: 0 X10(3) UL (ref 0–0.7)
EOSINOPHIL NFR BLD AUTO: 0 %
ERYTHROCYTE [DISTWIDTH] IN BLOOD BY AUTOMATED COUNT: 15.7 % (ref 11–15)
GLOBULIN PLAS-MCNC: 3.2 G/DL (ref 2–3.5)
GLUCOSE BLD-MCNC: 234 MG/DL (ref 70–99)
GLUCOSE BLDC GLUCOMTR-MCNC: 169 MG/DL (ref 70–99)
GLUCOSE BLDC GLUCOMTR-MCNC: 179 MG/DL (ref 70–99)
GLUCOSE BLDC GLUCOMTR-MCNC: 270 MG/DL (ref 70–99)
GLUCOSE BLDC GLUCOMTR-MCNC: 318 MG/DL (ref 70–99)
HCT VFR BLD AUTO: 24.5 %
HGB BLD-MCNC: 7.9 G/DL
IMM GRANULOCYTES # BLD AUTO: 0.22 X10(3) UL (ref 0–1)
IMM GRANULOCYTES NFR BLD: 1.2 %
LYMPHOCYTES # BLD AUTO: 0.63 X10(3) UL (ref 1–4)
LYMPHOCYTES NFR BLD AUTO: 3.4 %
MCH RBC QN AUTO: 30.4 PG (ref 26–34)
MCHC RBC AUTO-ENTMCNC: 32.2 G/DL (ref 31–37)
MCV RBC AUTO: 94.2 FL
MONOCYTES # BLD AUTO: 0.88 X10(3) UL (ref 0.1–1)
MONOCYTES NFR BLD AUTO: 4.7 %
NEUTROPHILS # BLD AUTO: 16.87 X10 (3) UL (ref 1.5–7.7)
NEUTROPHILS # BLD AUTO: 16.87 X10(3) UL (ref 1.5–7.7)
NEUTROPHILS NFR BLD AUTO: 90.6 %
OSMOLALITY SERPL CALC.SUM OF ELEC: 334 MOSM/KG (ref 275–295)
PLATELET # BLD AUTO: 437 10(3)UL (ref 150–450)
POTASSIUM SERPL-SCNC: 4.6 MMOL/L (ref 3.5–5.1)
PROT SERPL-MCNC: 6.7 G/DL (ref 5.7–8.2)
RBC # BLD AUTO: 2.6 X10(6)UL
SODIUM SERPL-SCNC: 145 MMOL/L (ref 136–145)
WBC # BLD AUTO: 18.6 X10(3) UL (ref 4–11)

## 2024-10-11 PROCEDURE — 99233 SBSQ HOSP IP/OBS HIGH 50: CPT | Performed by: HOSPITALIST

## 2024-10-11 PROCEDURE — 99233 SBSQ HOSP IP/OBS HIGH 50: CPT | Performed by: INTERNAL MEDICINE

## 2024-10-11 RX ORDER — INSULIN DEGLUDEC 100 U/ML
10 INJECTION, SOLUTION SUBCUTANEOUS DAILY
Status: DISCONTINUED | OUTPATIENT
Start: 2024-10-11 | End: 2024-10-16

## 2024-10-11 RX ORDER — METHYLPREDNISOLONE SODIUM SUCCINATE 40 MG/ML
40 INJECTION, POWDER, LYOPHILIZED, FOR SOLUTION INTRAMUSCULAR; INTRAVENOUS 2 TIMES DAILY
Status: COMPLETED | OUTPATIENT
Start: 2024-10-11 | End: 2024-10-13

## 2024-10-11 NOTE — OCCUPATIONAL THERAPY NOTE
OCCUPATIONAL THERAPY TREATMENT NOTE - INPATIENT    Room Number: 225/225-A     Presenting Problem: Closed L femur fracture; s/p L anterior replacement; WBAT, anterior hip precautions     Problem List  Principal Problem:    Closed displaced midcervical fracture of left femur with delayed healing  Active Problems:    ESRD (end stage renal disease) ([facility])    Closed fracture of left hip (formerly [facility])    Hyperphosphatemia    Respiratory failure ([facility])    Anemia      OCCUPATIONAL THERAPY ASSESSMENT   Patient demonstrates limited progress this session, goals remain in progress.    Patient is requiring maximum assist x 2 as a result of the following impairments: decreased functional strength and decreased endurance.    Patient continues to function below baseline with adls and functional mobility.  Next session anticipate patient to progress bed mobility, transfers, and functional standing tolerance.  Occupational Therapy will continue to follow patient for duration of hospitalization.    Patient continues to benefit from continued skilled OT services: to promote return to prior level of function and safety with continuous assistance and gradual rehabilitative therapy.     PLAN DURING HOSPITALIZATION  OT Device Recommendations: TBD  OT Treatment Plan: Compensatory technique education;Patient/Family training;Patient/Family education;Endurance training;ADL training;Functional transfer training     SUBJECTIVE  Pt agreeable to oob activity    OBJECTIVE  Precautions: Limb alert - left;WENDY - anterior    WEIGHT BEARING RESTRICTION  L Lower Extremity: Weight Bearing as Tolerated    PAIN ASSESSMENT  Rating: -- (pt offering no c/o pain)  Location: hip  Management Techniques: Activity promotion    ACTIVITY TOLERANCE  good    O2 SATURATIONS  Activity on room air    ACTIVITIES OF DAILY LIVING ASSESSMENT  AM-PAC ‘6-Clicks’ Inpatient Daily Activity Short Form  How much help from another person does the patient currently need…  -   Putting on and  taking off regular lower body clothing?: A Lot  -   Bathing (including washing, rinsing, drying)?: A Lot  -   Toileting, which includes using toilet, bedpan or urinal? : A Lot  -   Putting on and taking off regular upper body clothing?: A Lot  -   Taking care of personal grooming such as brushing teeth?: A Little  -   Eating meals?: A Little    AM-PAC Score:  Score: 14  Approx Degree of Impairment: 59.67%  Standardized Score (AM-PAC Scale): 33.39  CMS Modifier (G-Code): CK      FUNCTIONAL ADL ASSESSMENT  Eating: setup assist  Grooming: min assist  UB Dressing: min assist  LB Dressing: max assist  Toileting: na    Skilled Therapy Provided: RN contacted prior to start of care. Treatment coordinated w/ PT. Pt agreeable to participation in therapy. Gait belt used during dynamic activity. Pt received in bed;spouse at bedside. Pt currently requires max a x 2  to transfer supine<>sit at eob. Pt maintained unsupported eob w/ close supervision/cga. Pt required max a x 2 to transfer short distance bed<>chair w/ rw. Sit<>stand from chair initiated w/ mod a. Pt ambulating short distance w/ mod a x 2 and chair follow.    At end of session pt remaining up in chair w/ all needs in reach and alarm on;spouse at bedside. RN aware of pt's status and performance in therapy      EDUCATION PROVIDED  Patient Education : Plan of Care; Functional Transfer Techniques; Fall Prevention  Patient's Response to Education: Verbalized Understanding    The patient's Approx Degree of Impairment: 59.67% has been calculated based on documentation in the [facility] '6 clicks' Inpatient Daily Activity Short Form.  Research supports that patients with this level of impairment may benefit from IRF.  Final disposition will be made by interdisciplinary medical team.    Patient End of Session: Up in chair;Needs met;Call light within reach;RN aware of session/findings;All patient questions and concerns addressed;Alarm set;Family present    OT Goals:     Patient  will complete LE dressing with SBA   Comment: pt required max a    Patient will complete toilet transfer with SBA   Comment: pt required max a x 2 for transfer to chair    Patient will complete self care task at sink level with SBA    Comment:na             Goals  on: 24  Frequency:3-5x week    Therapeutic Activity: 23 minutes

## 2024-10-11 NOTE — PHYSICAL THERAPY NOTE
PHYSICAL THERAPY TREATMENT NOTE - INPATIENT     Room Number: 225/225-A       Presenting Problem: fall, s/p L WENDY  Co-Morbidities : Anemia    Asthma ([facility])   Back problem   BPH (benign prostatic hyperplasia)   Calculus of kidney   Cataract   Coronary atherosclerosis   Diabetes ([facility])   ESRD (end stage renal disease) on dialysis ([facility])   Essential hypertension   High blood pressure   High cholesterol   History of blood transfusion   Hyperlipidemia   Neuropathy    hands and feet   KRAIG on CPAP   Renal disorder   Sleep apnea   Visual impairment    glasses   Vocal cord paralysis, unilateral partial    Problem List  Principal Problem:    Closed displaced midcervical fracture of left femur with delayed healing  Active Problems:    ESRD (end stage renal disease) ([facility])    Closed fracture of left hip ([facility])    Hyperphosphatemia    Respiratory failure ([facility])    Anemia      PHYSICAL THERAPY ASSESSMENT   Patient demonstrates fair progress this session, goals  remain in progress.      Patient is requiring dependent as a result of the following impairments: decreased functional strength, decreased endurance/aerobic capacity, pain, impaired sitting/standing balance, decreased muscular endurance, and medical status.     Patient continues to function below baseline with bed mobility, transfers, gait, stair negotiation, maintaining seated position, standing prolonged periods, and performing household tasks.  Next session anticipate patient to progress bed mobility, transfers, gait, stair negotiation, maintaining seated position, standing prolonged periods, and performing household tasks.  Physical Therapy will continue to follow patient for duration of hospitalization.    Patient continues to benefit from continued skilled PT services: to promote return to prior level of function and safety with continuous assistance and gradual rehabilitative therapy .    PLAN DURING HOSPITALIZATION  Nursing Mobility Recommendation : Lift Equipment (versus  2 person assist)     PT Treatment Plan: Bed mobility;Body mechanics;Coordination;Endurance;Energy conservation;Gait training;Strengthening;Stair training;Transfer training;Balance training  Frequency (Obs): 5x/week     SUBJECTIVE  \"That's the plan\"    OBJECTIVE  Precautions: Limb alert - left;WENDY - anterior    WEIGHT BEARING RESTRICTION  L Lower Extremity: Weight Bearing as Tolerated      PAIN ASSESSMENT   Ratin  Location: LLE  Management Techniques: Activity promotion;Relaxation;Repositioning;Body mechanics;Breathing techniques    BALANCE  Static Sitting: Fair  Dynamic Sitting: Poor  Static Standing: Poor  Dynamic Standing: Dependent    ACTIVITY TOLERANCE  Pulse: 82  Heart Rate Source: Monitor     BP: 156/85  BP Location: Left arm  BP Method: Automatic  Patient Position: Sitting     O2 WALK  Oxygen Therapy  SPO2% on Room Air at Rest: 95  SPO2% Ambulation on Room Air: 94    AM-PAC '6-Clicks' INPATIENT SHORT FORM - BASIC MOBILITY  How much difficulty does the patient currently have...  Patient Difficulty: Turning over in bed (including adjusting bedclothes, sheets and blankets)?: Unable   Patient Difficulty: Sitting down on and standing up from a chair with arms (e.g., wheelchair, bedside commode, etc.): Unable   Patient Difficulty: Moving from lying on back to sitting on the side of the bed?: Unable   How much help from another person does the patient currently need...   Help from Another: Moving to and from a bed to a chair (including a wheelchair)?: Total   Help from Another: Need to walk in hospital room?: Total   Help from Another: Climbing 3-5 steps with a railing?: Total     AM-PAC Score:  Raw Score: 6   Approx Degree of Impairment: 100%   Standardized Score (AM-PAC Scale): 23.55   CMS Modifier (G-Code): CN    FUNCTIONAL ABILITY STATUS  Functional Mobility/Gait Assessment  Gait Assistance: Dependent assistance (moderate assistance x 2)  Distance (ft): ~8 feet  Assistive Device: Rolling walker (with a close  chair follow)  Pattern:  (step to pattern, decreased pushpa, decreased step length, forward flexed posture, narrow base of support, maximal verbal cues for sequencing and rolling walker management.)  Rolling: maximum assist  Supine to Sit: dependent (maximal assistance x 2)  Sit to Supine: dependent (maximal assistance x 2)  Sit to Stand: dependent (maximal assistance x 2)    Skilled Therapy Provided: Patient received supine in bed at initiation of session agreeable to participation in PT. Spouse at bedside during session. Patient tolerates treatment fairly, demonstrates improvement in sitting balance in comparison to previous session, however continues to require extensive 2 person assistance for all out of bed mobility. Patient requires maximal assistance x 2 to perform supine to sit transfer. Tolerates edge of bed sitting for ~7 minutes, fluctuating between contact guard and standby assistance to remain upright. Patient requires maximal assistance x 2 to perform bed to chair transfer. Once seated in bedside recliner able to perform sit to stand transfer with moderate assistance x 2. Ambulates ~8 feet with moderate assistance x 2 and a close chair follow. Patient left in bedside recliner. lines intact, needs in reach and handoff to RN.     The patient's Approx Degree of Impairment: 100% has been calculated based on documentation in the [facility] '6 clicks' Inpatient Daily Activity Short Form.  Research supports that patients with this level of impairment may benefit from LTAC, however given patient's previous level of function will recommend subacute rehab.  Final disposition will be made by interdisciplinary medical team.    THERAPEUTIC EXERCISES  Lower Extremity Ankle pumps  Glut sets  Knee extension     Position Sitting       Patient End of Session: Up in chair;Needs met;Call light within reach;RN aware of session/findings;All patient questions and concerns addressed;Hospital anti-slip socks;Alarm set    CURRENT  GOALS   Goals to be met by: 10/22/24  Patient Goal Patient's self-stated goal is: to return to PLOF   Goal #1 Patient is able to demonstrate supine - sit EOB @ level: moderate assistance      Goal #1   Current Status  max A x 2   Goal #2 Patient is able to demonstrate transfers EOB to/from Chair/Wheelchair at assistance level: moderate assistance with  least restrictive device      Goal #2  Current Status  max A x 2   Goal #3 Patient is able to ambulate >10 feet with assist device:  least restrictive device  at assistance level: moderate assistance   Goal #3   Current Status  8 feet Mod A x 2 with a close chair follow   Goal #4     Goal #4   Current Status     Goal #5 Patient to demonstrate independence with home activity/exercise instructions provided to patient in preparation for discharge.   Goal #5   Current Status  Progressing   Goal #6     Goal #6  Current Status       Gait Training: 10 minutes  Therapeutic Activity: 13 minutes    Amaris Martinez PT, DPT

## 2024-10-11 NOTE — DIETARY NOTE
ADULT NUTRITION REASSESSMENT    Pt is at moderate nutrition risk.  Pt does not meet malnutrition criteria.      RECOMMENDATIONS TO MD:   CPM. See nutrition intervention for diet specifics.    ADMITTING DIAGNOSIS:  Closed fracture of left hip, initial encounter (Formerly [facility]) [S72.002A]  PERTINENT PAST MEDICAL HISTORY:   Past Medical History:    Anemia    Asthma (Formerly [facility])    Back problem    BPH (benign prostatic hyperplasia)    Calculus of kidney    Cataract    Coronary atherosclerosis    Diabetes (Formerly [facility])    ESRD (end stage renal disease) on dialysis (Formerly [facility])    Essential hypertension    High blood pressure    High cholesterol    History of blood transfusion    Hyperlipidemia    Neuropathy    hands and feet    KRAIG on CPAP    Renal disorder    Sleep apnea    Visual impairment    glasses    Vocal cord paralysis, unilateral partial       PATIENT STATUS: Initial 10/02/24: Pt admit for displaced left femoral neck fracture with uncontrolled pain-possible surgery planned. Pt intubated on 9/29 for respiratory failure. There was a concern for GI bleed thus held off on tube feeds. PMH as above.and notable for ESRD HD.   Pt assessed due to consult for tube feeding. Pt NPO day# 4. (See brief note per RD 9/30). Discussed with RN last pm re: potential tube feeding start pending Ortho surgeon jonathan . GI status improved. Propofol low dose in progress (~220 kcals/lipids) Pt screened at no nutrition risk on admit however weight hx reflects a 10% wt loss over past ~ 1 month: this is a severe weight loss. Will clarify with family as able.  Current BMI reflects wt/ht wnl. Diet hx to be obtained.   Appropriate to start Nepro formula-see orders below. No documentation of tube feed start- possible holding for surgery or possible extubation? Will discuss at rounds today. Addendum: TF ordered late last pm- did not start.    10/7/24: s/p L FRANCE 10/4. Wound vac to L.hip. Tube feeds initiated 10/2 am but stopped 10/6 with extubation and OG tube out. Calculated ave  tf intake 10/2-10/6 and only ~ 500 kcals/day- EN was held for high GRV10/3. Overall nutrition intake poor- not safe for po intake today s/p SLP BSSE. Met with Pt and spouse. Pt c/o \"sore throat\" so does not want to eat/swallow. Discussed nutrition plan of care. Per spouse pt had good appetite pta following renal diet, higher in protein per renal MD recommendations. Feels wt loss was not intentional but had adjusted diet somewhat for higher protein intake ie more fish, chicken, protein bars. Hope to advance diet tomorrow vs need replace feeding tube.   10/11/24 UPDATE: Pt sleeping and undergoing HD at time of visit. Spouse at bedside. Spouse reports pt's appetite/intake is improving since extubation. Pt skipped lunch today r/t gets nauseated with HD. Spouse ordering dinner tray now. Declining ONS at this time. Reports pt is unhappy with thickened liquids. Per spouse typical dry wt 154 lbs.     FOOD/NUTRITION RELATED HISTORY:  Appetite: Good PTA. Improving.  Intake: PTA good appetite/spouse and pt. Renal diet PTA follow with RDU RD. EN feeds 10/2-10/6 while intubated. Pureed diet resumed on 10/8, upgraded to chopped on 10/9.  Intake Meeting Needs: marginal - monitor need for ONS  Percent Meals Eaten (last 3 days)       Date/Time Percent Meals Eaten (%)    10/08/24 1400 75 %    10/09/24 0916 100 %    10/11/24 0900 75 %    10/11/24 1400 0 %     Percent Meals Eaten (%): refused lunch at 10/11/24 1400        Food Allergies: No Known Food Allergies (NKFA)  Cultural/Ethnic/Alevism Preferences: Not Obtained    GASTROINTESTINAL: +BM last recorded small, soft brown stool on 10/8; dysphagia with BSSE noted; OG out 10/6; abdomen soft, rounded/distended, with active bowel sounds.    MEDICATIONS: reviewed; DM meds adjusted today for improved glycemic control; IV steroids; renvela TID with meal; scheduled colace & senokot;    sodium chloride      sodium chloride      dextrose 10% Stopped (10/05/24 1910)      insulin degludec  10  Units Subcutaneous Daily    insulin aspart  3 Units Subcutaneous TID CC    methylPREDNISolone  40 mg Intravenous BID    ipratropium-albuterol  3 mL Nebulization 4 times per day    clopidogrel  75 mg Oral Daily    carvedilol  25 mg Oral BID    sevelamer carbonate  800 mg Oral TID CC    insulin aspart  1-7 Units Subcutaneous TID CC and HS    atorvastatin  40 mg Oral Nightly    epoetin donna  5,000 Units Subcutaneous Once per day on Monday Wednesday Friday    heparin  5,000 Units Subcutaneous Q8H STEPHANIE    senna  10 mL Oral BID    docusate  100 mg Oral BID    mineral oil-white petrolatum  1 Application Both Eyes Nightly    lidocaine-menthol  2 patch Transdermal Daily    pantoprazole  40 mg Intravenous Q12H    cetirizine  5 mg Per NG Tube Daily    fluticasone-salmeterol  1 puff Inhalation BID     LABS: reviewed; labs c/w ESRD on HD; hyperphosphatemia with Renvela added; POC Glu 270-318 - long acting insulin started this afternoon  Recent Labs     10/08/24  0957 10/09/24  0643 10/10/24  0345 10/11/24  0404   * 256* 189* 234*   BUN 61* 87* 43* 86*   CREATSERUM 5.86* 7.07* 4.59* 6.95*   CA 10.4 9.9 9.9 9.9   * 134* 141 145   K 4.7 4.7 4.8 4.6   CL 96* 95* 100 103   CO2 26.0 25.0 27.0 31.0   PHOS 8.6*  --  7.7*  --    OSMOCALC 300* 313* 308* 334*     NUTRITION RELATED PHYSICAL FINDINGS:  - Nutrition Focused Physical Exam (NFPE): well nourished per visual exam  - Fluid Accumulation: non-pitting Left Lower extremity, Right Upper extremity, and generalized see RN documentation for details  - Skin Integrity: surgical wound(s) hip with vac.  see RN documentation for details    ANTHROPOMETRICS:  HT:  5'4\"   WT: 65.6 kg (144 lb 10 oz)  BMI: Body mass index is 24.81 kg/m².  BMI CLASSIFICATION: 19-24.9 kg/m2 - WNL  IBW: 130  lbs        111% IBW  Usual Body Wt: 154 lbs (typical dry wt)      94% UBW (6% loss/1 month: severe loss)     WEIGHT HISTORY:  Patient Weight(s) for the past 336 hrs:   Weight   10/05/24 0600 65.6 kg  (144 lb 10 oz)   10/04/24 0600 66 kg (145 lb 8.1 oz)   10/02/24 1310 70.9 kg (156 lb 4.9 oz)   10/02/24 0800 65.2 kg (143 lb 11.8 oz)   10/01/24 0600 72.6 kg (160 lb 0.9 oz)   09/30/24 0400 74.4 kg (164 lb 0.4 oz)   09/28/24 1649 70.6 kg (155 lb 11.2 oz)     Wt Readings from Last 10 Encounters:   10/05/24 65.6 kg (144 lb 10 oz)   08/22/24 72.5 kg (159 lb 12.8 oz)   08/20/24 71.7 kg (158 lb)   08/13/24 65.6 kg (144 lb 11.2 oz)   08/06/24 70.8 kg (156 lb)   08/01/24 66.5 kg (146 lb 8 oz)   06/27/24 73.9 kg (163 lb)   06/13/24 71 kg (156 lb 8 oz)   06/05/24 68.2 kg (150 lb 4.8 oz)   05/28/24 74.2 kg (163 lb 9.6 oz)     NUTRITION DIAGNOSIS/PROBLEM:   Inadequate oral intake related to Decreased ability to consume sufficient energy in the setting of intubation as evidenced by NPO, 0 nutrition intake.     NUTRITION DIAGNOSIS PROGRESS:  Improvement (unresolved) - appetite/intake of meal trays improving    NUTRITION INTERVENTION:     NUTRITION PRESCRIPTION:   Estimated Nutrition needs: --dosing wt of 65  kg - wt taken on 10/2  Calories: 3675-7072 calories/day (25-30 calories per kg Dosing wt)  Protein: 78-98 g protein/day (1.2-1.5  g protein/kg Dosing wt)  Fluid Needs: 1625 ml (1 ml/kcal). Adjust per clinical status (ESRD, oliguric)     - Diet:       Procedures    Renal diet Renal; Calorie Restriction/Carb Controlled: 1800 kcal/60 grams; Fluid Restriction: 1500 ml; Fluid Consistency: Nectar Thick / Mildly Thick Liquids; Texture Consistency: Chopped / Soft & Bite Sized; Is Patient on Accuchecks? Yes     - Medical Food Supplements: Monitor for need - declining at this time.   - Vitamin and mineral supplements: PTA vitamin D  - Feeding assistance: NPO  - Nutrition education: assess education needs   - Coordination of nutrition care: collaboration with other providers and discussed in Care Rounds   - Discharge and transfer of nutrition care to new setting or provider: monitor plans    MONITOR AND EVALUATE/NUTRITION GOALS:  -  Food and Nutrient Intake:      Monitor: for PO initiation once deemed safe per SLP.   - Food and Nutrient Administration:      Monitor: for need to resume temporary EN earlier   - Anthropometric Measurement:    Monitor weight  - Nutrition Goals:      diet initiation vs nutrition support within 24 hrs, labs within acceptable limits, and euglycemia. Promote wound healing.     DIETITIAN FOLLOW UP: RD to follow    Birgit Malagon MS, RD, LDN, CNSC  [id number]

## 2024-10-11 NOTE — PROGRESS NOTES
Ollie Hernández Patient Status:  Inpatient    1945 MRN [id number]   Location [facility] 2W/SW Attending Ne Irizarry MD   Hosp Day # 13 PCP Wili Parmar MD     Critical Care Progress Note     S: Mild stridor noted at bedside overnight.  BIPAP with sleep.      OBJECTIVE:  Patient Vitals for the past 24 hrs:   BP Temp Temp src Pulse Resp SpO2   10/11/24 0600 156/68 -- -- 69 19 96 %   10/11/24 0400 134/69 98.3 °F (36.8 °C) Temporal 71 18 90 %   10/11/24 0200 (!) 169/70 -- -- 73 19 96 %   10/11/24 0000 130/54 98 °F (36.7 °C) Temporal 72 17 95 %   10/10/24 2200 153/65 -- -- 71 19 100 %   10/10/24 2000 151/69 98.1 °F (36.7 °C) Temporal 80 19 95 %   10/10/24 1800 (!) 117/92 -- -- 80 23 95 %   10/10/24 1700 151/60 -- -- 80 20 92 %   10/10/24 1600 137/66 98.1 °F (36.7 °C) Temporal 77 19 93 %   10/10/24 1500 137/73 -- -- (!) 129 20 98 %   10/10/24 1400 136/62 -- -- 80 20 96 %   10/10/24 1300 150/67 -- -- 79 19 95 %   10/10/24 1200 (!) 161/72 98.2 °F (36.8 °C) Temporal 81 17 95 %   10/10/24 1100 (!) 162/71 -- -- 82 17 95 %   10/10/24 1000 146/66 -- -- 85 19 98 %   10/10/24 0930 143/77 -- -- 80 -- --   10/10/24 0905 143/77 -- -- 80 -- --   10/10/24 0900 (!) 206/173 -- -- 85 25 95 %   10/10/24 0800 (!) 163/59 97.3 °F (36.3 °C) Temporal 91 22 96 %        O2/Ventilator Settings: RA      Wt Readings from Last 3 Encounters:   10/05/24 144 lb 10 oz (65.6 kg)   24 159 lb 12.8 oz (72.5 kg)   24 158 lb (71.7 kg)        I/O last 3 completed shifts:  In: 440 [P.O.:440]  Out: 0   No intake/output data recorded.     Physical Exam:   General: follows commands   Lungs: clear bilaterally   Neck:  mild high pitched inspiratory stridor intermittently   Heart: Regular rate and rhythm, normal S1S2, no murmur.   Abdomen: soft, non-tender, non-distended, positive BS.   Extremity: no edema   Skin: No rashes or lesions.       Lab Results   Component Value Date    WBC 18.6 10/11/2024    RBC 2.60 10/11/2024    HGB  7.9 10/11/2024    HCT 24.5 10/11/2024    MCV 94.2 10/11/2024    MCH 30.4 10/11/2024    MCHC 32.2 10/11/2024    RDW 15.7 10/11/2024    .0 10/11/2024     Lab Results   Component Value Date     10/11/2024    K 4.6 10/11/2024     10/11/2024    CO2 31.0 10/11/2024    BUN 86 10/11/2024    CREATSERUM 6.95 10/11/2024     10/11/2024    CA 9.9 10/11/2024    ALKPHO 115 10/11/2024    ALT <7 10/11/2024    AST 29 10/11/2024    BILT 0.3 10/11/2024    ALB 3.5 10/11/2024    TP 6.7 10/11/2024             Imaging: Reviewed     ASSESSMENT/PLAN:     Acute respiratory failure - due to pulmonary edema and/or aspiration. Intubated 9/29. CXR with interval improvement.  Extubated 10/6 with post-extubation stridor prompting transfer back to ICU 10/10.  - s/p Zosyn x 5 days  - bipap with all sleep and as needed  - cont IV steroids, will not taper today  - duonebs q6H  - volume management with HD  ESRD - hyponatremic on labs today  - HD per nephrology  KRAIG  - on CPAP at home, on bipap here as above  Asthma  - steroids as above  - Advair in lieu of Symbicort  CV: h/o pAFib  - S/p watchman 9/2024.   - Per cardiology.   Hip fracture after fall  - s/p L FRANCE 10/4  - increase activity with PT/OT  - per ortho.   Ppx  - subcu heparin  - PPI  Dispo  - will follow  - ICU monitoring  Critical Care Time greater than: 35 minutes    Talha Boyer MD  10/11/2024  7:11 AM

## 2024-10-11 NOTE — PROGRESS NOTES
[facility]      [facility] Cardiology Progress Note    Ollie Hernández Patient Status:  Inpatient    1945 MRN [id number]   Location [facility] 2W/SW Attending Pranay Michel MD   Hosp Day # 13 PCP Wili Parmar MD       Impression/Plan:  79 year old male presenting with:    Impression:  Hip fracture Left - S/P L anterior total hip arthroplasty 10/4/24  Acute resp failure, aspiration and/or pulmonary edema; intubated 2024 extubated 10/6  -Failed weaning trial yesterday  ESRD on HD  DM  PAF, currently SR. S/p watchman device 24  HTN  HL, on statin PTA  Acute on chronic diastolic CHF  CAD s/p [place] circ 24   Coffee grounds in OG tube  -Slight decline in Hb post-op  Elevated troponin, likely demand ischemia    Plan:  - Cont carvedilol for rate control BP. Follow BP as restarts PRN labetalol    - Cont statin   - Plavix restarted 10/8 Hb stable taking PO  monitor for bleeding (recent Watchman implant 2024) consider restarting ASA 81 if Hb con't to remain stable   - HD as per nephrology  - Vent management as per pulm > on RA    - Monitor on tele  - Reviewed w/ family at bedside and RN  planning for rehab         Subjective:     Transferred back to unit for dyspnea last nite   Still forgetful but better   Dialysis today     Better today up in chair walking  on RA     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)    Atherosclerosis of native arteries of extremities with  intermittent claudication, bilateral legs (HCC)    Primary hypertension    Smokers' cough (HCC)    Unstable angina (HCC)    Lower GI bleed    ESRD (end stage renal disease) on dialysis (HCC)    PAF (paroxysmal atrial fibrillation) (HCC)    AVM (arteriovenous malformation) of colon    ABLA (acute blood loss anemia)    Rectal bleeding    Anticoagulated    Antiplatelet or antithrombotic long-term use    Lower GI bleeding    ESRD on hemodialysis (HCC)    GI bleed    Closed displaced midcervical fracture of left femur with delayed healing    ESRD (end stage renal disease) ([facility])    Closed fracture of left hip ([facility])    Hyperphosphatemia    Respiratory failure (HCC)    Anemia       Objective:   Temp: 98.3 °F (36.8 °C)  Pulse: 69  Resp: 19  BP: 156/68    Intake/Output:     Intake/Output Summary (Last 24 hours) at 10/11/2024 0622  Last data filed at 10/11/2024 0600  Gross per 24 hour   Intake 440 ml   Output 0 ml   Net 440 ml       Last 3 Weights   10/05/24 0600 144 lb 10 oz (65.6 kg)   10/04/24 0600 145 lb 8.1 oz (66 kg)   10/02/24 1310 156 lb 4.9 oz (70.9 kg)   10/02/24 0800 143 lb 11.8 oz (65.2 kg)   10/01/24 0600 160 lb 0.9 oz (72.6 kg)   09/30/24 0400 164 lb 0.4 oz (74.4 kg)   09/28/24 1649 155 lb 11.2 oz (70.6 kg)   08/22/24 1311 159 lb 12.8 oz (72.5 kg)   08/20/24 0933 158 lb (71.7 kg)       Tele: NSR PAC' s    Physical Exam:    General: Awake   HEENT: Normocephalic, anicteric sclera NG tube out   Neck: supple  Cardiac: Regular rate and rhythm. S1, S2 normal. No murmur, pericardial rub, S3, thrill, heave or extra cardiac sounds.  Lungs: Coarse breath sounds bilat  Abdomen: Soft, non-distended  Extremities: Without clubbing, cyanosis or edema.  Peripheral pulses are 2+. SCD boots   Neurologic: Alert+confused   Skin: Warm and dry.     Laboratory/Data:    Labs:         Recent Labs   Lab 10/07/24  0407 10/08/24  0949 10/09/24  0643 10/10/24  0345 10/11/24  0404   WBC 12.6* 21.2* 19.1* 20.0* 18.6*   HGB 7.9* 7.7* 7.9* 9.6*  7.9*   MCV 92.2 91.2 93.8 92.4 94.2   .0 296.0 330.0 372.0 437.0       Recent Labs   Lab 10/07/24  0409 10/08/24  0957 10/09/24  0643 10/10/24  0345 10/11/24  0404   * 134* 134* 141 145   K 4.6 4.7 4.7 4.8 4.6   CL 92* 96* 95* 100 103   CO2 22.0 26.0 25.0 27.0 31.0   BUN 65* 61* 87* 43* 86*   CREATSERUM 6.84* 5.86* 7.07* 4.59* 6.95*   CA 10.0 10.4 9.9 9.9 9.9   PHOS 9.6* 8.6*  --  7.7*  --    * 176* 256* 189* 234*       Recent Labs   Lab 10/07/24  0409 10/09/24  0643 10/10/24  0345 10/11/24  0404   ALT  --  <7* <7* <7*   AST  --  53* 43* 29   ALB 3.6 3.6 3.9 3.5       No results for input(s): \"TROP\" in the last 168 hours.    Allergies:   Allergies   Allergen Reactions    Adhesive Tape OTHER (SEE COMMENTS)     Severe rashes    Dust Mite Extract RASH       Medications:  Current Facility-Administered Medications   Medication Dose Route Frequency    sodium chloride 0.9 % IV bolus 100 mL  100 mL Intravenous Q30 Min PRN    And    albumin human (Albumin) 25% injection 25 g  25 g Intravenous PRN Dialysis    influenza virus trivalent high dose PF (Fluzone HD) 0.5 mL injection (ages >/= 65 years) 0.5 mL  0.5 mL Intramuscular Prior to discharge    ipratropium-albuterol (Duoneb) 0.5-2.5 (3) MG/3ML inhalation solution 3 mL  3 mL Nebulization 4 times per day    methylPREDNISolone sodium succinate (Solu-MEDROL) injection 40 mg  40 mg Intravenous Q8H    clopidogrel (Plavix) tab 75 mg  75 mg Oral Daily    carvedilol (Coreg) tab 25 mg  25 mg Oral BID    sevelamer carbonate (Renvela) tab 800 mg  800 mg Oral TID CC    insulin aspart (NovoLOG) 100 Units/mL FlexPen 1-7 Units  1-7 Units Subcutaneous TID CC and HS    atorvastatin (Lipitor) tab 40 mg  40 mg Oral Nightly    HYDROmorphone (Dilaudid) 1 MG/ML injection 0.1 mg  0.1 mg Intravenous Q2H PRN    Or    HYDROmorphone (Dilaudid) 1 MG/ML injection 0.2 mg  0.2 mg Intravenous Q2H PRN    Or    HYDROmorphone (Dilaudid) 1 MG/ML injection 0.4 mg  0.4 mg Intravenous Q2H PRN     epoetin donna (Epogen, Procrit) 5,000 Units injection  5,000 Units Subcutaneous Once per day on Monday Wednesday Friday    metoclopramide (Reglan) 5 mg/mL injection 10 mg  10 mg Intravenous Q24H PRN    sodium chloride 0.9 % irrigation    Continuous PRN    sodium chloride 0.9 % irrigation    Continuous PRN    ondansetron (Zofran) 4 MG/2ML injection 4 mg  4 mg Intravenous Q6H PRN    diphenhydrAMINE (Benadryl) cap/tab 25 mg  25 mg Oral Q4H PRN    Or    diphenhydrAMINE (Benadryl) 50 mg/mL  injection 12.5 mg  12.5 mg Intravenous Q4H PRN    heparin (Porcine) 5000 UNIT/ML injection 5,000 Units  5,000 Units Subcutaneous Q8H STEPHANIE    dextrose 10% infusion (TPN no rate)   Intravenous Continuous PRN    pancrelipase (Lip-Prot-Amyl) (Zenpep) DR particles cap 10,000 Units  10,000 Units Per G Tube PRN    And    sodium bicarbonate tab 325 mg  325 mg Oral PRN    senna (Senokot) 8.8 MG/5ML oral syrup 17.6 mg  10 mL Oral BID    docusate (Colace) 50 MG/5ML oral solution 100 mg  100 mg Oral BID    mineral oil-white petrolatum (Artificial Tears) 83-15 % ophthalmic ointment 1 Application  1 Application Both Eyes Nightly    lidocaine-menthol 4-1 % patch 2 patch  2 patch Transdermal Daily    pantoprazole (Protonix) 40 mg in sodium chloride 0.9% PF 10 mL IV push  40 mg Intravenous Q12H    hydrALAzine (Apresoline) 20 mg/mL injection 10 mg  10 mg Intravenous Q6H PRN    labetalol (Trandate) 5 mg/mL injection 10 mg  10 mg Intravenous Q4H PRN    cetirizine (ZyrTEC) tab 5 mg  5 mg Per NG Tube Daily    acetaminophen (Tylenol) 160 MG/5ML oral liquid 500 mg  500 mg Per NG Tube Daily PRN    acetaminophen (Tylenol) tab 650 mg  650 mg Oral Q4H PRN    Or    acetaminophen (Tylenol) 160 MG/5ML oral liquid 650 mg  650 mg Per NG Tube Q4H PRN    Or    acetaminophen (Tylenol) rectal suppository 650 mg  650 mg Rectal Q4H PRN    polyethylene glycol (PEG 3350) (Miralax) 17 g oral packet 17 g  17 g Per NG Tube Daily PRN    heparin (Porcine) 1000 UNIT/ML injection  1,500 Units  1.5 mL Intravenous PRN Dialysis    lidocaine-prilocaine (Emla) 2.5-2.5 % cream   Topical PRN    bisacodyl (Dulcolax) 10 MG rectal suppository 10 mg  10 mg Rectal Daily PRN    albuterol (Ventolin HFA) 108 (90 Base) MCG/ACT inhaler 2 puff  2 puff Inhalation Q4H PRN    fluticasone propionate (Flonase) 50 MCG/ACT nasal suspension 2 spray  2 spray Nasal Daily PRN    glucose (Dex4) 15 GM/59ML oral liquid 15 g  15 g Oral Q15 Min PRN    Or    glucose (Glutose) 40% oral gel 15 g  15 g Oral Q15 Min PRN    Or    glucose-vitamin C (Dex-4) chewable tab 4 tablet  4 tablet Oral Q15 Min PRN    Or    dextrose 50% injection 50 mL  50 mL Intravenous Q15 Min PRN    Or    glucose (Dex4) 15 GM/59ML oral liquid 30 g  30 g Oral Q15 Min PRN    Or    glucose (Glutose) 40% oral gel 30 g  30 g Oral Q15 Min PRN    Or    glucose-vitamin C (Dex-4) chewable tab 8 tablet  8 tablet Oral Q15 Min PRN    fluticasone-salmeterol (Advair Diskus) 250-50 MCG/ACT inhaler 1 puff  1 puff Inhalation BID

## 2024-10-11 NOTE — PLAN OF CARE
Patient AO x4 on room air. Up to chair with PT/OT stand pivot. Patient took a few steps forward with walker and x2 assist - RN/PCT. Levemir added, Prandial Novolog added, and steroid frequency adjusted per MD's. HD ongoing this evening- sinus tachycardia noted about 40 minutes into HD but resolved without intervention - renal MD aware. Wound vac removed by Ortho surgery. Site swabbed with betadine and silver mepilex applied, to be changed daily. Safety measures maintained, no signs of injury.    Problem: Diabetes/Glucose Control  Goal: Glucose maintained within prescribed range  Description: INTERVENTIONS:  - Monitor Blood Glucose as ordered  - Assess for signs and symptoms of hyperglycemia and hypoglycemia  - Administer ordered medications to maintain glucose within target range  - Assess barriers to adequate nutritional intake and initiate nutrition consult as needed  - Instruct patient on self management of diabetes  Outcome: Progressing     Problem: PAIN - ADULT  Goal: Verbalizes/displays adequate comfort level or patient's stated pain goal  Description: INTERVENTIONS:  - Encourage pt to monitor pain and request assistance  - Assess pain using appropriate pain scale  - Administer analgesics based on type and severity of pain and evaluate response  - Implement non-pharmacological measures as appropriate and evaluate response  - Consider cultural and social influences on pain and pain management  - Manage/alleviate anxiety  - Utilize distraction and/or relaxation techniques  - Monitor for opioid side effects  - Notify MD/LIP if interventions unsuccessful or patient reports new pain  - Anticipate increased pain with activity and pre-medicate as appropriate  Outcome: Progressing     Problem: SAFETY ADULT - FALL  Goal: Free from fall injury  Description: INTERVENTIONS:  - Assess pt frequently for physical needs  - Identify cognitive and physical deficits and behaviors that affect risk of falls.  - [place] fall  precautions as indicated by assessment.  - Educate pt/family on patient safety including physical limitations  - Instruct pt to call for assistance with activity based on assessment  - Modify environment to reduce risk of injury  - Provide assistive devices as appropriate  - Consider OT/PT consult to assist with strengthening/mobility  - Encourage toileting schedule  Outcome: Progressing     Problem: DISCHARGE PLANNING  Goal: Discharge to home or other facility with appropriate resources  Description: INTERVENTIONS:  - Identify barriers to discharge w/pt and caregiver  - Include patient/family/discharge partner in discharge planning  - Arrange for needed discharge resources and transportation as appropriate  - Identify discharge learning needs (meds, wound care, etc)  - Arrange for interpreters to assist at discharge as needed  - Consider post-discharge preferences of patient/family/discharge partner  - Complete POLST form as appropriate  - Assess patient's ability to be responsible for managing their own health  - Refer to Case Management Department for coordinating discharge planning if the patient needs post-hospital services based on physician/LIP order or complex needs related to functional status, cognitive ability or social support system  Outcome: Progressing     Problem: CARDIOVASCULAR - ADULT  Goal: Maintains optimal cardiac output and hemodynamic stability  Description: INTERVENTIONS:  - Monitor vital signs, rhythm, and trends  - Monitor for bleeding, hypotension and signs of decreased cardiac output  - Evaluate effectiveness of vasoactive medications to optimize hemodynamic stability  - Monitor arterial and/or venous puncture sites for bleeding and/or hematoma  - Assess quality of pulses, skin color and temperature  - Assess for signs of decreased coronary artery perfusion - ex. Angina  - Evaluate fluid balance, assess for edema, trend weights  Outcome: Progressing  Goal: Absence of cardiac arrhythmias  or at baseline  Description: INTERVENTIONS:  - Continuous cardiac monitoring, monitor vital signs, obtain 12 lead EKG if indicated  - Evaluate effectiveness of antiarrhythmic and heart rate control medications as ordered  - Initiate emergency measures for life threatening arrhythmias  - Monitor electrolytes and administer replacement therapy as ordered  Outcome: Progressing     Problem: RESPIRATORY - ADULT  Goal: Achieves optimal ventilation and oxygenation  Description: INTERVENTIONS:  - Assess for changes in respiratory status  - Assess for changes in mentation and behavior  - Position to facilitate oxygenation and minimize respiratory effort  - Oxygen supplementation based on oxygen saturation or ABGs  - Provide Smoking Cessation handout, if applicable  - Encourage broncho-pulmonary hygiene including cough, deep breathe, Incentive Spirometry  - Assess the need for suctioning and perform as needed  - Assess and instruct to report SOB or any respiratory difficulty  - Respiratory Therapy support as indicated  - Manage/alleviate anxiety  - Monitor for signs/symptoms of CO2 retention  Outcome: Progressing     Problem: GASTROINTESTINAL - ADULT  Goal: Maintains or returns to baseline bowel function  Description: INTERVENTIONS:  - Assess bowel function  - Maintain adequate hydration with IV or PO as ordered and tolerated  - Evaluate effectiveness of GI medications  - Encourage mobilization and activity  - Obtain nutritional consult as needed  - Establish a toileting routine/schedule  - Consider collaborating with pharmacy to review patient's medication profile  Outcome: Progressing

## 2024-10-11 NOTE — SPIRITUAL CARE NOTE
Spiritual Care Visit Note    Patient Name: Ollie Hernández Date of Spiritual Care Visit: 10/11/24   : 1945 Primary Dx: Closed displaced midcervical fracture of left femur with delayed healing       Referred By: Referral From:     Spiritual Care Taxonomy:    Intended Effects: Build relationship of care and support    Methods: Accompany someone in their spiritual/Shinto practice outside your osmin tradition;Collaborate with care team member;Encourage self care;Encourage self reflection;Offer emotional support;Offer spiritual/Shinto support;Offer support    Interventions: Acknowledge current situation;Acknowledge response to difficult experience;Active listening;Silent prayer    Visit Type/Summary:     - Spiritual Care: Responded to a request for spiritual care and assessed the patient for spiritual care needs. Consulted with RN prior to visit. Offered empathic listening and emotional support. Patient and family expressed appreciation for  visit. Family member at bedside.  remains available as needed for follow up.    Spiritual Care support can be requested via an Epic consult. For urgent/immediate needs, please contact the On Call  at: [place]: ext [phone]    Greg RADER  Chaplain Resident.  [phone]

## 2024-10-11 NOTE — PROGRESS NOTES
[facility]  part of [facility]    Progress Note      Subjective:     Eating BF- family at bedside and feeding him. Not on any NC. Appears weak and ill     Review of Systems:     Limited - denies any cp . No abd.pain or NV    Objective:   Temp:  [97.4 °F (36.3 °C)-98.3 °F (36.8 °C)] 97.4 °F (36.3 °C)  Pulse:  [] 78  Resp:  [17-23] 17  BP: (117-169)/(54-92) 151/78  SpO2:  [90 %-100 %] 95 %  SpO2: 95 %     Intake/Output Summary (Last 24 hours) at 10/11/2024 1151  Last data filed at 10/11/2024 0600  Gross per 24 hour   Intake 440 ml   Output 0 ml   Net 440 ml     Wt Readings from Last 3 Encounters:   10/05/24 144 lb 10 oz (65.6 kg)   08/22/24 159 lb 12.8 oz (72.5 kg)   08/20/24 158 lb (71.7 kg)       General appearance: Alert and awake.  Oriented.  Ill appearing and weak.   Head: Normocephalic, atraumatic  Eyes: conjunctivae/corneas clear  Throat: lips, mucosa, and tongue normal; teeth and gums normal  Neck:  no JVD, supple  Skin: No rashes or lesions  Neurologic: Grossly normal  Psychiatric: calm    Medications:  Current Facility-Administered Medications   Medication Dose Route Frequency    insulin degludec (Tresiba) 100 units/mL flextouch 10 Units  10 Units Subcutaneous Daily    insulin aspart (NovoLOG) 100 Units/mL FlexPen 3 Units  3 Units Subcutaneous TID CC    sodium chloride 0.9 % IV bolus 100 mL  100 mL Intravenous Q30 Min PRN    And    albumin human (Albumin) 25% injection 25 g  25 g Intravenous PRN Dialysis    influenza virus trivalent high dose PF (Fluzone HD) 0.5 mL injection (ages >/= 65 years) 0.5 mL  0.5 mL Intramuscular Prior to discharge    ipratropium-albuterol (Duoneb) 0.5-2.5 (3) MG/3ML inhalation solution 3 mL  3 mL Nebulization 4 times per day    methylPREDNISolone sodium succinate (Solu-MEDROL) injection 40 mg  40 mg Intravenous Q8H    clopidogrel (Plavix) tab 75 mg  75 mg Oral Daily    carvedilol (Coreg) tab 25 mg  25 mg Oral BID    sevelamer carbonate (Renvela) tab  800 mg  800 mg Oral TID CC    insulin aspart (NovoLOG) 100 Units/mL FlexPen 1-7 Units  1-7 Units Subcutaneous TID CC and HS    atorvastatin (Lipitor) tab 40 mg  40 mg Oral Nightly    HYDROmorphone (Dilaudid) 1 MG/ML injection 0.1 mg  0.1 mg Intravenous Q2H PRN    Or    HYDROmorphone (Dilaudid) 1 MG/ML injection 0.2 mg  0.2 mg Intravenous Q2H PRN    Or    HYDROmorphone (Dilaudid) 1 MG/ML injection 0.4 mg  0.4 mg Intravenous Q2H PRN    epoetin donna (Epogen, Procrit) 5,000 Units injection  5,000 Units Subcutaneous Once per day on Monday Wednesday Friday    metoclopramide (Reglan) 5 mg/mL injection 10 mg  10 mg Intravenous Q24H PRN    sodium chloride 0.9 % irrigation    Continuous PRN    sodium chloride 0.9 % irrigation    Continuous PRN    ondansetron (Zofran) 4 MG/2ML injection 4 mg  4 mg Intravenous Q6H PRN    diphenhydrAMINE (Benadryl) cap/tab 25 mg  25 mg Oral Q4H PRN    Or    diphenhydrAMINE (Benadryl) 50 mg/mL  injection 12.5 mg  12.5 mg Intravenous Q4H PRN    heparin (Porcine) 5000 UNIT/ML injection 5,000 Units  5,000 Units Subcutaneous Q8H STEPHANIE    dextrose 10% infusion (TPN no rate)   Intravenous Continuous PRN    pancrelipase (Lip-Prot-Amyl) (Zenpep) DR particles cap 10,000 Units  10,000 Units Per G Tube PRN    And    sodium bicarbonate tab 325 mg  325 mg Oral PRN    senna (Senokot) 8.8 MG/5ML oral syrup 17.6 mg  10 mL Oral BID    docusate (Colace) 50 MG/5ML oral solution 100 mg  100 mg Oral BID    mineral oil-white petrolatum (Artificial Tears) 83-15 % ophthalmic ointment 1 Application  1 Application Both Eyes Nightly    lidocaine-menthol 4-1 % patch 2 patch  2 patch Transdermal Daily    pantoprazole (Protonix) 40 mg in sodium chloride 0.9% PF 10 mL IV push  40 mg Intravenous Q12H    hydrALAzine (Apresoline) 20 mg/mL injection 10 mg  10 mg Intravenous Q6H PRN    labetalol (Trandate) 5 mg/mL injection 10 mg  10 mg Intravenous Q4H PRN    cetirizine (ZyrTEC) tab 5 mg  5 mg Per NG Tube Daily    acetaminophen  (Tylenol) 160 MG/5ML oral liquid 500 mg  500 mg Per NG Tube Daily PRN    acetaminophen (Tylenol) tab 650 mg  650 mg Oral Q4H PRN    Or    acetaminophen (Tylenol) 160 MG/5ML oral liquid 650 mg  650 mg Per NG Tube Q4H PRN    Or    acetaminophen (Tylenol) rectal suppository 650 mg  650 mg Rectal Q4H PRN    polyethylene glycol (PEG 3350) (Miralax) 17 g oral packet 17 g  17 g Per NG Tube Daily PRN    heparin (Porcine) 1000 UNIT/ML injection 1,500 Units  1.5 mL Intravenous PRN Dialysis    lidocaine-prilocaine (Emla) 2.5-2.5 % cream   Topical PRN    bisacodyl (Dulcolax) 10 MG rectal suppository 10 mg  10 mg Rectal Daily PRN    albuterol (Ventolin HFA) 108 (90 Base) MCG/ACT inhaler 2 puff  2 puff Inhalation Q4H PRN    fluticasone propionate (Flonase) 50 MCG/ACT nasal suspension 2 spray  2 spray Nasal Daily PRN    glucose (Dex4) 15 GM/59ML oral liquid 15 g  15 g Oral Q15 Min PRN    Or    glucose (Glutose) 40% oral gel 15 g  15 g Oral Q15 Min PRN    Or    glucose-vitamin C (Dex-4) chewable tab 4 tablet  4 tablet Oral Q15 Min PRN    Or    dextrose 50% injection 50 mL  50 mL Intravenous Q15 Min PRN    Or    glucose (Dex4) 15 GM/59ML oral liquid 30 g  30 g Oral Q15 Min PRN    Or    glucose (Glutose) 40% oral gel 30 g  30 g Oral Q15 Min PRN    Or    glucose-vitamin C (Dex-4) chewable tab 8 tablet  8 tablet Oral Q15 Min PRN    fluticasone-salmeterol (Advair Diskus) 250-50 MCG/ACT inhaler 1 puff  1 puff Inhalation BID              Results:     Recent Labs   Lab 10/09/24  0643 10/10/24  0345 10/11/24  0404   RBC 2.57* 3.17* 2.60*   HGB 7.9* 9.6* 7.9*   HCT 24.1* 29.3* 24.5*   MCV 93.8 92.4 94.2   NEPRELIM 17.90* 18.10* 16.87*   WBC 19.1* 20.0* 18.6*   .0 372.0 437.0     Recent Labs   Lab 10/09/24  0643 10/10/24  0345 10/11/24  0404   * 189* 234*   BUN 87* 43* 86*   CREATSERUM 7.07* 4.59* 6.95*   CA 9.9 9.9 9.9   ALB 3.6 3.9 3.5   * 141 145   K 4.7 4.8 4.6   CL 95* 100 103   CO2 25.0 27.0 31.0   ALKPHO 132* 130*  115   AST 53* 43* 29   ALT <7* <7* <7*   BILT 0.3 0.3 0.3   TP 6.8 7.3 6.7     PTT   Date Value Ref Range Status   08/09/2024 30.1 23.0 - 36.0 seconds Final     INR   Date Value Ref Range Status   08/09/2024 1.45 (H) 0.80 - 1.20 Final     Comment:     Therapeutic INR range for patients on warfarin:  2.0-3.0 for most medical conditions and surgical prophylaxis   2.5-3.5 for mechanical heart valves and recurrent thromboembolism    Direct thrombin inhibitors (e.g. argatroban, bivalirudin), factor Xa inhibitors (e.g. apixaban, rivaroxaban), and conditions such as coagulation factor deficiency, vitamin K deficiency, and liver disease will prolong the prothrombin time/INR.    Unfractionated heparin and low molecular weight heparin do not affect the prothrombin time/INR up to a concentration of 1 IU/mL and 1.5 IU/mL, respectively.         No results for input(s): \"BNP\" in the last 168 hours.  Recent Labs   Lab 10/07/24  0409 10/08/24  0957 10/10/24  0345   PHOS 9.6* 8.6* 7.7*        Recent Labs   Lab 10/07/24  0409 10/08/24  0957 10/09/24  0643 10/10/24  0345 10/11/24  0404   PHOS 9.6* 8.6*  --  7.7*  --    ALB 3.6  --  3.6 3.9 3.5       US VENOUS DOPPLER LEG LEFT - DIAG IMG (CPT=93971)    Result Date: 10/10/2024  CONCLUSION: No evidence of left lower extremity DVT.    Dictated by (CST): Kevin Hensley MD on 10/10/2024 at 1:58 PM     Finalized by (CST): Kevin Hensley MD on 10/10/2024 at 1:59 PM          XR CHEST AP PORTABLE  (CPT=71045)    Result Date: 10/9/2024  CONCLUSION:   Mild cardiomegaly with interstitial pulmonary edema.   Bibasilar opacities are favored to represent subsegmental atelectasis due to low lung volumes.  Other superimposed process less likely.  Attention on follow-up is recommended.    Dictated by (CST): Apple Pierre MD on 10/09/2024 at 12:18 PM     Finalized by (CST): Apple Pierre MD on 10/09/2024 at 12:20 PM               Assessment and Plan:       79 year old male with past medical  history of T2DM, HTN, HLD, ESRD on HD MWF, CAD s/p PCI (5/2024), A.fib on Eliquis, HFpEF, KRAIG, BPH, history of recurrent gastrointestinal bleeding presented to the ER after a fall sustaining a left hip fracture    1.ESRD: HD Monday Wednesday Friday  HD today and will attempt 3 liters UF as tolerated   hyperphosphatemia: Low phos diet. Improve phos - repeat in am   Sodium high normal - on modified diet     2.anemia: Decline in hemoglobin noted  Patient with history of recurrent GI bleed.  S/p EGD and colonoscopy in July 2024  Hemoglobin 10.5->9.6 ->7.6 - monitor   Plavix resumed  On IV Protonix twice daily  On Epogen-s/p IV iron    3.coronary artery disease status post PCI in May 2024/paroxysmal A-fib status post Watchman device  Cardiology input noted    4.acute respiratory failure: Presumably secondary to aspiration/pulmonary edema  S/p extubation 10/6.  Transferred to ICU for increase WOB. Currently on RA and sat.ok    5.left hip fracture: S/p left total hip arthroplasty on 10/4    Discussed with nursing and family at bedside     Walker Sheppard MD  10/11/2024

## 2024-10-11 NOTE — PROGRESS NOTES
[facility]  part of [facility]    Progress Note    Ollie Hernández Patient Status:  Inpatient    1945 MRN [id number]   Location [facility] 2W/SW Attending Ne Irizarry MD   Hosp Day # 13 PCP Wili Parmar MD         Subjective:     Constitutional:         Patient awake and alert in hospital bed.  Wife and son visiting.  Able to hold conversation.  He described walking on the left hip as \"katiuska\".  He has been up to the chair.       Objective:   Blood pressure 121/74, pulse 69, temperature 97.4 °F (36.3 °C), temperature source Temporal, resp. rate 21, height 5' 4.02\" (1.626 m), weight 144 lb 10 oz (65.6 kg), SpO2 99%.  Physical Exam  Musculoskeletal:      Comments: Left hip wound VAC removed.  Wound clean dry and intact and I showed it to the wife.  Painted with Betadine and Aquacel dressing in place.  Nurse is going to order silver Mepilex dressings to the floor.  Able to dorsiflex and plantarflex his left foot today but not so much the right foot.  Getting dialysis currently.  Calf soft nontender.  No swelling of the thigh or left leg.         Results:   Lab Results   Component Value Date    WBC 18.6 (H) 10/11/2024    HGB 7.9 (L) 10/11/2024    HCT 24.5 (L) 10/11/2024    .0 10/11/2024    CREATSERUM 6.95 (H) 10/11/2024    BUN 86 (H) 10/11/2024     10/11/2024    K 4.6 10/11/2024     10/11/2024    CO2 31.0 10/11/2024     (H) 10/11/2024    CA 9.9 10/11/2024    ALB 3.5 10/11/2024    ALKPHO 115 10/11/2024    BILT 0.3 10/11/2024    TP 6.7 10/11/2024    AST 29 10/11/2024    ALT <7 (L) 10/11/2024    PTT 30.1 2024    INR 1.45 (H) 2024    T4F 0.9 2022    TSH 2.844 2024     (H) 2024    PSA 2.94 10/20/2021    DDIMER 6.07 (H) 2024    ESRML 10 2024    CRP 1.30 (H) 2024    MG 2.2 10/02/2024    PHOS 7.7 (H) 10/10/2024    TROP <0.045 2019    TROPHS 56 (HH) 2024     2023    B12 672  07/04/2024       US VENOUS DOPPLER LEG LEFT - DIAG IMG (CPT=93971)    Result Date: 10/10/2024  CONCLUSION: No evidence of left lower extremity DVT.    Dictated by (CST): Kevin Hensley MD on 10/10/2024 at 1:58 PM     Finalized by (CST): Kevin Hensley MD on 10/10/2024 at 1:59 PM               Assessment & Plan:     Closed displaced midcervical fracture of left femur with delayed healing  From orthopedic standpoint he is doing well and should be ambulated weightbearing as tolerated with the left hip.  He will need walker and assist to do this.  Continue rehabilitation as his medical conditions allow.      ESRD (end stage renal disease) (HCC)  Per renal       Closed fracture of left hip (HCC)  As above.      Hyperphosphatemia  Per renal and medicine.      Respiratory failure (HCC)  Per medicine and pulmonary      Anemia  Per medicine      Humberto Murphy MD  10/11/2024

## 2024-10-12 ENCOUNTER — TELEPHONE (OUTPATIENT)
Dept: CARE COORDINATION | Age: 79
End: 2024-10-12

## 2024-10-12 ENCOUNTER — APPOINTMENT (OUTPATIENT)
Dept: GENERAL RADIOLOGY | Facility: HOSPITAL | Age: 79
End: 2024-10-12
Attending: INTERNAL MEDICINE
Payer: MEDICARE

## 2024-10-12 LAB
ANION GAP SERPL CALC-SCNC: 11 MMOL/L (ref 0–18)
ATRIAL RATE: 120 BPM
BUN BLD-MCNC: 48 MG/DL (ref 9–23)
BUN/CREAT SERPL: 11.3 (ref 10–20)
CALCIUM BLD-MCNC: 10.3 MG/DL (ref 8.7–10.4)
CHLORIDE SERPL-SCNC: 104 MMOL/L (ref 98–112)
CO2 SERPL-SCNC: 27 MMOL/L (ref 21–32)
CREAT BLD-MCNC: 4.23 MG/DL
DEPRECATED RDW RBC AUTO: 54.4 FL (ref 35.1–46.3)
EGFRCR SERPLBLD CKD-EPI 2021: 14 ML/MIN/1.73M2 (ref 60–?)
ERYTHROCYTE [DISTWIDTH] IN BLOOD BY AUTOMATED COUNT: 15.6 % (ref 11–15)
GLUCOSE BLD-MCNC: 190 MG/DL (ref 70–99)
GLUCOSE BLDC GLUCOMTR-MCNC: 151 MG/DL (ref 70–99)
GLUCOSE BLDC GLUCOMTR-MCNC: 201 MG/DL (ref 70–99)
GLUCOSE BLDC GLUCOMTR-MCNC: 219 MG/DL (ref 70–99)
GLUCOSE BLDC GLUCOMTR-MCNC: 245 MG/DL (ref 70–99)
HCT VFR BLD AUTO: 26.1 %
HGB BLD-MCNC: 8.1 G/DL
MCH RBC QN AUTO: 29.9 PG (ref 26–34)
MCHC RBC AUTO-ENTMCNC: 31 G/DL (ref 31–37)
MCV RBC AUTO: 96.3 FL
OSMOLALITY SERPL CALC.SUM OF ELEC: 312 MOSM/KG (ref 275–295)
P AXIS: -11 DEGREES
P-R INTERVAL: 122 MS
PLATELET # BLD AUTO: 448 10(3)UL (ref 150–450)
POTASSIUM SERPL-SCNC: 4.5 MMOL/L (ref 3.5–5.1)
Q-T INTERVAL: 322 MS
QRS DURATION: 76 MS
QTC CALCULATION (BEZET): 455 MS
R AXIS: 25 DEGREES
RBC # BLD AUTO: 2.71 X10(6)UL
SODIUM SERPL-SCNC: 142 MMOL/L (ref 136–145)
T AXIS: 113 DEGREES
VENTRICULAR RATE: 120 BPM
WBC # BLD AUTO: 20.5 X10(3) UL (ref 4–11)

## 2024-10-12 PROCEDURE — 99233 SBSQ HOSP IP/OBS HIGH 50: CPT | Performed by: INTERNAL MEDICINE

## 2024-10-12 PROCEDURE — 71045 X-RAY EXAM CHEST 1 VIEW: CPT | Performed by: INTERNAL MEDICINE

## 2024-10-12 PROCEDURE — 99233 SBSQ HOSP IP/OBS HIGH 50: CPT | Performed by: HOSPITALIST

## 2024-10-12 RX ORDER — IPRATROPIUM BROMIDE AND ALBUTEROL SULFATE 2.5; .5 MG/3ML; MG/3ML
3 SOLUTION RESPIRATORY (INHALATION)
Status: DISCONTINUED | OUTPATIENT
Start: 2024-10-12 | End: 2024-10-22

## 2024-10-12 RX ORDER — METOPROLOL TARTRATE 1 MG/ML
5 INJECTION, SOLUTION INTRAVENOUS ONCE
Status: COMPLETED | OUTPATIENT
Start: 2024-10-12 | End: 2024-10-12

## 2024-10-12 RX ORDER — IPRATROPIUM BROMIDE AND ALBUTEROL SULFATE 2.5; .5 MG/3ML; MG/3ML
3 SOLUTION RESPIRATORY (INHALATION) EVERY 6 HOURS PRN
Status: DISCONTINUED | OUTPATIENT
Start: 2024-10-12 | End: 2024-10-22

## 2024-10-12 NOTE — PROGRESS NOTES
[facility]  part of [facility]    Progress Note      Subjective:     Sleepy and bit groggy today - HR in 70s. BP stable.     Review of Systems:     Limited - denies any cp . No abd.pain or NV    Objective:   Temp:  [97.2 °F (36.2 °C)-98.5 °F (36.9 °C)] 98.3 °F (36.8 °C)  Pulse:  [] 121  Resp:  [15-22] 17  BP: ()/() 129/72  SpO2:  [95 %-100 %] 97 %  SpO2: 97 %     Intake/Output Summary (Last 24 hours) at 10/12/2024 1329  Last data filed at 10/11/2024 1400  Gross per 24 hour   Intake --   Output 0 ml   Net 0 ml     Wt Readings from Last 3 Encounters:   10/05/24 144 lb 10 oz (65.6 kg)   08/22/24 159 lb 12.8 oz (72.5 kg)   08/20/24 158 lb (71.7 kg)       General appearance: Alert and awake.  Oriented.  Ill appearing and weak.   Head: Normocephalic, atraumatic  Eyes: conjunctivae/corneas clear  Throat: lips, mucosa, and tongue normal; teeth and gums normal  Neck:  no JVD, supple  Skin: No rashes or lesions  Neurologic: Grossly normal  Psychiatric: calm    Medications:  Current Facility-Administered Medications   Medication Dose Route Frequency    ipratropium-albuterol (Duoneb) 0.5-2.5 (3) MG/3ML inhalation solution 3 mL  3 mL Nebulization 2 times daily    ipratropium-albuterol (Duoneb) 0.5-2.5 (3) MG/3ML inhalation solution 3 mL  3 mL Nebulization Q6H PRN    insulin degludec (Tresiba) 100 units/mL flextouch 10 Units  10 Units Subcutaneous Daily    insulin aspart (NovoLOG) 100 Units/mL FlexPen 3 Units  3 Units Subcutaneous TID CC    methylPREDNISolone sodium succinate (Solu-MEDROL) injection 40 mg  40 mg Intravenous BID    influenza virus trivalent high dose PF (Fluzone HD) 0.5 mL injection (ages >/= 65 years) 0.5 mL  0.5 mL Intramuscular Prior to discharge    clopidogrel (Plavix) tab 75 mg  75 mg Oral Daily    carvedilol (Coreg) tab 25 mg  25 mg Oral BID    sevelamer carbonate (Renvela) tab 800 mg  800 mg Oral TID CC    insulin aspart (NovoLOG) 100 Units/mL FlexPen 1-7 Units  1-7  Units Subcutaneous TID CC and HS    atorvastatin (Lipitor) tab 40 mg  40 mg Oral Nightly    HYDROmorphone (Dilaudid) 1 MG/ML injection 0.1 mg  0.1 mg Intravenous Q2H PRN    Or    HYDROmorphone (Dilaudid) 1 MG/ML injection 0.2 mg  0.2 mg Intravenous Q2H PRN    Or    HYDROmorphone (Dilaudid) 1 MG/ML injection 0.4 mg  0.4 mg Intravenous Q2H PRN    epoetin donna (Epogen, Procrit) 5,000 Units injection  5,000 Units Subcutaneous Once per day on Monday Wednesday Friday    metoclopramide (Reglan) 5 mg/mL injection 10 mg  10 mg Intravenous Q24H PRN    sodium chloride 0.9 % irrigation    Continuous PRN    sodium chloride 0.9 % irrigation    Continuous PRN    ondansetron (Zofran) 4 MG/2ML injection 4 mg  4 mg Intravenous Q6H PRN    diphenhydrAMINE (Benadryl) cap/tab 25 mg  25 mg Oral Q4H PRN    Or    diphenhydrAMINE (Benadryl) 50 mg/mL  injection 12.5 mg  12.5 mg Intravenous Q4H PRN    heparin (Porcine) 5000 UNIT/ML injection 5,000 Units  5,000 Units Subcutaneous Q8H STEPHANIE    dextrose 10% infusion (TPN no rate)   Intravenous Continuous PRN    pancrelipase (Lip-Prot-Amyl) (Zenpep) DR particles cap 10,000 Units  10,000 Units Per G Tube PRN    And    sodium bicarbonate tab 325 mg  325 mg Oral PRN    senna (Senokot) 8.8 MG/5ML oral syrup 17.6 mg  10 mL Oral BID    docusate (Colace) 50 MG/5ML oral solution 100 mg  100 mg Oral BID    mineral oil-white petrolatum (Artificial Tears) 83-15 % ophthalmic ointment 1 Application  1 Application Both Eyes Nightly    lidocaine-menthol 4-1 % patch 2 patch  2 patch Transdermal Daily    pantoprazole (Protonix) 40 mg in sodium chloride 0.9% PF 10 mL IV push  40 mg Intravenous Q12H    hydrALAzine (Apresoline) 20 mg/mL injection 10 mg  10 mg Intravenous Q6H PRN    labetalol (Trandate) 5 mg/mL injection 10 mg  10 mg Intravenous Q4H PRN    cetirizine (ZyrTEC) tab 5 mg  5 mg Per NG Tube Daily    acetaminophen (Tylenol) 160 MG/5ML oral liquid 500 mg  500 mg Per NG Tube Daily PRN    acetaminophen (Tylenol)  tab 650 mg  650 mg Oral Q4H PRN    Or    acetaminophen (Tylenol) 160 MG/5ML oral liquid 650 mg  650 mg Per NG Tube Q4H PRN    Or    acetaminophen (Tylenol) rectal suppository 650 mg  650 mg Rectal Q4H PRN    polyethylene glycol (PEG 3350) (Miralax) 17 g oral packet 17 g  17 g Per NG Tube Daily PRN    heparin (Porcine) 1000 UNIT/ML injection 1,500 Units  1.5 mL Intravenous PRN Dialysis    lidocaine-prilocaine (Emla) 2.5-2.5 % cream   Topical PRN    bisacodyl (Dulcolax) 10 MG rectal suppository 10 mg  10 mg Rectal Daily PRN    albuterol (Ventolin HFA) 108 (90 Base) MCG/ACT inhaler 2 puff  2 puff Inhalation Q4H PRN    fluticasone propionate (Flonase) 50 MCG/ACT nasal suspension 2 spray  2 spray Nasal Daily PRN    glucose (Dex4) 15 GM/59ML oral liquid 15 g  15 g Oral Q15 Min PRN    Or    glucose (Glutose) 40% oral gel 15 g  15 g Oral Q15 Min PRN    Or    glucose-vitamin C (Dex-4) chewable tab 4 tablet  4 tablet Oral Q15 Min PRN    Or    dextrose 50% injection 50 mL  50 mL Intravenous Q15 Min PRN    Or    glucose (Dex4) 15 GM/59ML oral liquid 30 g  30 g Oral Q15 Min PRN    Or    glucose (Glutose) 40% oral gel 30 g  30 g Oral Q15 Min PRN    Or    glucose-vitamin C (Dex-4) chewable tab 8 tablet  8 tablet Oral Q15 Min PRN    fluticasone-salmeterol (Advair Diskus) 250-50 MCG/ACT inhaler 1 puff  1 puff Inhalation BID              Results:     Recent Labs   Lab 10/09/24  0643 10/10/24  0345 10/11/24  0404 10/12/24  0332   RBC 2.57* 3.17* 2.60* 2.71*   HGB 7.9* 9.6* 7.9* 8.1*   HCT 24.1* 29.3* 24.5* 26.1*   MCV 93.8 92.4 94.2 96.3   NEPRELIM 17.90* 18.10* 16.87*  --    WBC 19.1* 20.0* 18.6* 20.5*   .0 372.0 437.0 448.0     Recent Labs   Lab 10/09/24  0643 10/10/24  0345 10/11/24  0404 10/12/24  0332   * 189* 234* 190*   BUN 87* 43* 86* 48*   CREATSERUM 7.07* 4.59* 6.95* 4.23*   CA 9.9 9.9 9.9 10.3   ALB 3.6 3.9 3.5  --    * 141 145 142   K 4.7 4.8 4.6 4.5   CL 95* 100 103 104   CO2 25.0 27.0 31.0 27.0    ALKPHO 132* 130* 115  --    AST 53* 43* 29  --    ALT <7* <7* <7*  --    BILT 0.3 0.3 0.3  --    TP 6.8 7.3 6.7  --      PTT   Date Value Ref Range Status   08/09/2024 30.1 23.0 - 36.0 seconds Final     INR   Date Value Ref Range Status   08/09/2024 1.45 (H) 0.80 - 1.20 Final     Comment:     Therapeutic INR range for patients on warfarin:  2.0-3.0 for most medical conditions and surgical prophylaxis   2.5-3.5 for mechanical heart valves and recurrent thromboembolism    Direct thrombin inhibitors (e.g. argatroban, bivalirudin), factor Xa inhibitors (e.g. apixaban, rivaroxaban), and conditions such as coagulation factor deficiency, vitamin K deficiency, and liver disease will prolong the prothrombin time/INR.    Unfractionated heparin and low molecular weight heparin do not affect the prothrombin time/INR up to a concentration of 1 IU/mL and 1.5 IU/mL, respectively.         No results for input(s): \"BNP\" in the last 168 hours.  Recent Labs   Lab 10/07/24  0409 10/08/24  0957 10/10/24  0345   PHOS 9.6* 8.6* 7.7*        Recent Labs   Lab 10/07/24  0409 10/08/24  0957 10/09/24  0643 10/10/24  0345 10/11/24  0404   PHOS 9.6* 8.6*  --  7.7*  --    ALB 3.6  --  3.6 3.9 3.5       XR CHEST AP PORTABLE  (CPT=71045)    Result Date: 10/12/2024  CONCLUSION: No acute cardiopulmonary abnormality.    Dictated by (CST): Oseas Good MD on 10/12/2024 at 7:42 AM     Finalized by (CST): Oseas Good MD on 10/12/2024 at 7:42 AM          US VENOUS DOPPLER LEG LEFT - DIAG IMG (CPT=93971)    Result Date: 10/10/2024  CONCLUSION: No evidence of left lower extremity DVT.    Dictated by (CST): Kevin Hensley MD on 10/10/2024 at 1:58 PM     Finalized by (CST): Kevin Hensley MD on 10/10/2024 at 1:59 PM         EKG 12 Lead    Result Date: 10/12/2024  Sinus rhythm with short AK with Premature atrial complexes Abnormal QRS-T angle, consider primary T wave abnormality Abnormal ECG When compared with ECG of 28-SEP-2024 12:04, Premature  ventricular complexes are no longer Present Premature atrial complexes are now Present    EKG 12 Lead    Result Date: 10/12/2024  Sinus tachycardia T wave abnormality, consider lateral ischemia Abnormal ECG When compared with ECG of 11-OCT-2024 18:17, Premature atrial complexes are no longer Present NV interval has increased Vent. rate has increased BY  41 BPM Borderline criteria for Inferior infarct are no longer Present ST no longer depressed in Inferior leads Non-specific change in ST segment in Lateral leads Confirmed by BLAS AUGUSTIN, ESTRELLA ([id number]) on 10/12/2024 5:01:34 AM     Assessment and Plan:       79 year old male with past medical history of T2DM, HTN, HLD, ESRD on HD MWF, CAD s/p PCI (5/2024), A.fib on Eliquis, HFpEF, KRAIG, BPH, history of recurrent gastrointestinal bleeding presented to the ER after a fall sustaining a left hip fracture    1.ESRD: HD Monday Wednesday Friday  HD today and will attempt 3 liters UF as tolerated   hyperphosphatemia: Low phos diet. Improve phos - repeat in am   Sodium wnl    2.anemia: Decline in hemoglobin noted  Patient with history of recurrent GI bleed.  S/p EGD and colonoscopy in July 2024  Hemoglobin 10.5->9.6 ->8.1 - monitor   Plavix resumed  On IV Protonix twice daily  On Epogen-s/p IV iron    3.coronary artery disease status post PCI in May 2024/paroxysmal A-fib status post Watchman device  Cardiology input noted    4.acute respiratory failure: Presumably secondary to aspiration/pulmonary edema  S/p extubation 10/6.  Saturating well on RA     5.left hip fracture: S/p left total hip arthroplasty on 10/4    Discussed with nursing and family at bedside     Walker Sheppard MD  10/12/2024

## 2024-10-12 NOTE — PLAN OF CARE
Problem: Patient Centered Care  Goal: Patient preferences are identified and integrated in the patient's plan of care  Description: Interventions:  - What would you like us to know as we care for you?   - Provide timely, complete, and accurate information to patient/family  - Incorporate patient and family knowledge, values, beliefs, and cultural backgrounds into the planning and delivery of care  - Encourage patient/family to participate in care and decision-making at the level they choose  - Honor patient and family perspectives and choices  Outcome: Progressing     Problem: Diabetes/Glucose Control  Goal: Glucose maintained within prescribed range  Description: INTERVENTIONS:  - Monitor Blood Glucose as ordered  - Assess for signs and symptoms of hyperglycemia and hypoglycemia  - Administer ordered medications to maintain glucose within target range  - Assess barriers to adequate nutritional intake and initiate nutrition consult as needed  - Instruct patient on self management of diabetes  Outcome: Progressing     Problem: Patient/Family Goals  Goal: Patient/Family Long Term Goal  Description: Patient's Long Term Goal:   Interventions:  -   - See additional Care Plan goals for specific interventions  Outcome: Progressing  Goal: Patient/Family Short Term Goal  Description: Patient's Short Term Goal:     Interventions:   -   - See additional Care Plan goals for specific interventions  Outcome: Progressing     Problem: PAIN - ADULT  Goal: Verbalizes/displays adequate comfort level or patient's stated pain goal  Description: INTERVENTIONS:  - Encourage pt to monitor pain and request assistance  - Assess pain using appropriate pain scale  - Administer analgesics based on type and severity of pain and evaluate response  - Implement non-pharmacological measures as appropriate and evaluate response  - Consider cultural and social influences on pain and pain management  - Manage/alleviate anxiety  - Utilize distraction  and/or relaxation techniques  - Monitor for opioid side effects  - Notify MD/LIP if interventions unsuccessful or patient reports new pain  - Anticipate increased pain with activity and pre-medicate as appropriate  Outcome: Progressing     Problem: SAFETY ADULT - FALL  Goal: Free from fall injury  Description: INTERVENTIONS:  - Assess pt frequently for physical needs  - Identify cognitive and physical deficits and behaviors that affect risk of falls.  - [place] fall precautions as indicated by assessment.  - Educate pt/family on patient safety including physical limitations  - Instruct pt to call for assistance with activity based on assessment  - Modify environment to reduce risk of injury  - Provide assistive devices as appropriate  - Consider OT/PT consult to assist with strengthening/mobility  - Encourage toileting schedule  Outcome: Progressing     Problem: DISCHARGE PLANNING  Goal: Discharge to home or other facility with appropriate resources  Description: INTERVENTIONS:  - Identify barriers to discharge w/pt and caregiver  - Include patient/family/discharge partner in discharge planning  - Arrange for needed discharge resources and transportation as appropriate  - Identify discharge learning needs (meds, wound care, etc)  - Arrange for interpreters to assist at discharge as needed  - Consider post-discharge preferences of patient/family/discharge partner  - Complete POLST form as appropriate  - Assess patient's ability to be responsible for managing their own health  - Refer to Case Management Department for coordinating discharge planning if the patient needs post-hospital services based on physician/LIP order or complex needs related to functional status, cognitive ability or social support system  Outcome: Progressing     Problem: CARDIOVASCULAR - ADULT  Goal: Maintains optimal cardiac output and hemodynamic stability  Description: INTERVENTIONS:  - Monitor vital signs, rhythm, and trends  - Monitor for  bleeding, hypotension and signs of decreased cardiac output  - Evaluate effectiveness of vasoactive medications to optimize hemodynamic stability  - Monitor arterial and/or venous puncture sites for bleeding and/or hematoma  - Assess quality of pulses, skin color and temperature  - Assess for signs of decreased coronary artery perfusion - ex. Angina  - Evaluate fluid balance, assess for edema, trend weights  Outcome: Progressing  Goal: Absence of cardiac arrhythmias or at baseline  Description: INTERVENTIONS:  - Continuous cardiac monitoring, monitor vital signs, obtain 12 lead EKG if indicated  - Evaluate effectiveness of antiarrhythmic and heart rate control medications as ordered  - Initiate emergency measures for life threatening arrhythmias  - Monitor electrolytes and administer replacement therapy as ordered  Outcome: Progressing     Problem: RESPIRATORY - ADULT  Goal: Achieves optimal ventilation and oxygenation  Description: INTERVENTIONS:  - Assess for changes in respiratory status  - Assess for changes in mentation and behavior  - Position to facilitate oxygenation and minimize respiratory effort  - Oxygen supplementation based on oxygen saturation or ABGs  - Provide Smoking Cessation handout, if applicable  - Encourage broncho-pulmonary hygiene including cough, deep breathe, Incentive Spirometry  - Assess the need for suctioning and perform as needed  - Assess and instruct to report SOB or any respiratory difficulty  - Respiratory Therapy support as indicated  - Manage/alleviate anxiety  - Monitor for signs/symptoms of CO2 retention  Outcome: Progressing     Problem: GASTROINTESTINAL - ADULT  Goal: Maintains or returns to baseline bowel function  Description: INTERVENTIONS:  - Assess bowel function  - Maintain adequate hydration with IV or PO as ordered and tolerated  - Evaluate effectiveness of GI medications  - Encourage mobilization and activity  - Obtain nutritional consult as needed  - Establish a  toileting routine/schedule  - Consider collaborating with pharmacy to review patient's medication profile  Outcome: Progressing

## 2024-10-12 NOTE — SLP NOTE
SPEECH DAILY NOTE - INPATIENT    ASSESSMENT & PLAN   ASSESSMENT    Dysphagia service: Pt alert and upright in bed with spouse at bedside. SLP provided education re: swallowing/aspiration precautions prior to intake. Pt in upright positioning, O2 saturations at 97% on room air. Pt accepting of PO trials mildly thick via tsp, moderately thick via tsp, puree and soft solid texture. Pt demonstrates adequate bolus acceptance and bilabial seal. Timely oral prep/transit. Pharyngeal swallow appears delayed with suspect reduced hyolaryngeal elevation/excursion to palpation. Pt demonstrates consistent throat clear and cough response with intake of mildly thick liquids via tsp; s/s distress subsided with intake of moderately thick liquids by tsp. Intermittent throat clearing with initial trials of soft solid texture, however suspect this was secondary to mildly thick liquids as s/s again subsided after transitioning to moderately thick liquids.    At this time, recommend DOWNGRADE to MODERATELY THICK LIQUIDS VIA TSP and SOFT AND BITE SIZE SOLIDS with strict adherence to aspiration/swallowing precautions previously established. SLP to follow closely for upgrade potential. Consider VFSS if s/s do not improve.    IMAGING:    10/12/24 CXR CONCLUSION:   No acute cardiopulmonary abnormality.       Dictated by (CST): Oseas Good MD on 10/12/2024 at 7:42 AM       Finalized by (CST): Oseas Good MD on 10/12/2024 at 7:42 AM       Diet Recommendations - Solids: Mechanical soft chopped/ Soft & Bite Sized  Diet Recommendations - Liquids: Honey thick liquids/ Moderately thick (by teaspoon only)    Compensatory Strategies Recommended: Liquids via spoon;No straws;Slow rate;Small bites and sips  Aspiration Precautions: Upright position;Slow rate;Small bites and sips;No straw  Medication Administration Recommendations: Crushed in puree (and/or via IV)    Patient Experiencing Pain: No     Treatment Plan  Treatment Plan/Recommendations:  Aspiration precautions (consider VFSS if s/s continue)    Interdisciplinary Communication: Discussed with RN  Recommendations posted at bedside        GOALS  Goal #1 The patient will tolerate bite sized consistency and mildly thick liquids without overt signs or symptoms of aspiration with 100 % accuracy over 1-2 session(s).  DOWNGRADE 10/12/24; see new goal below.    The patient will tolerate SOFT AND BITE SIZED consistency and MODERATELY THICK liquids VIA TSP without overt signs or symptoms of aspiration with 100 % accuracy over 1-2 session(s).    Goal modified 10/12           In Progress     Goal #2 The patient/family/caregiver will demonstrate understanding and implementation of aspiration precautions and swallow strategies independently over 1-2 session(s).  Reviewed with pt and spouse in detail.    In Progress   Goal #3 The patient will tolerate trial upgrade of soft/hard solid consistency and thin liquids without overt signs or symptoms of aspiration with 100 % accuracy over 1-2 session(s).  REVISED 10/12 following downgrade; see below.    The patient will tolerate trial upgrade of soft/hard solid consistency and mildly thick/thin liquids without overt signs or symptoms of aspiration with 100 % accuracy over 1-2 session(s).    Goal modified 10/12           In Progress   Goal #4 The patient will utilize compensatory strategies as outlined by  BSSE (clinical evaluation) including Slow rate, Small bites, Small sips, No straws, Upright 90 degrees, Eliminate distractions with PRN assistance 100 % of the time across 2 sessions.  SLP provided 1:1 feeding assist with implementation of precautions.    In Progress     FOLLOW UP  Follow Up Needed (Documentation Required): Yes  SLP Follow-up Date: 10/13/24  Number of Visits to Meet Established Goals: 3    Session: 3rd session following re-BSE.    If you have any questions, please contact ARTEMIO Hwang.    Sarah Shelley MS CCC-SLP/L  Speech Language  Pathologist  EXT [phone]

## 2024-10-12 NOTE — PROGRESS NOTES
Patient to transfer to [facility], report given to Stella JERRY. Patient and family notified. Belongings gathered and sent with patient.     Double RN skin check done prior to transfer off Unit. Skin check performed by this RN and CARISSA Talley.     Wounds are as follows: Left hip surgical incision.     Will remain available for any further questions or concerns.

## 2024-10-12 NOTE — PROGRESS NOTES
[facility]  part of [facility]    Progress Note    Ollie Hernández Patient Status:  Inpatient    1945 MRN [id number]   Location [facility] 2W/SW Attending Pranay Michel MD   Hosp Day # 14 PCP Wili Parmar MD     Chief Complaint:   Chief Complaint   Patient presents with    Fall       Subjective:   Ollie Hernández   Status post left hip arthroplasty on 10/4/2024.      Patient successfully extubated 10/6/2024.    Patient is struggling to breathe. Concern for aspiration.   Now on room air  In the chair.  Family at bedside.       Objective:   Objective:    Blood pressure 129/72, pulse (!) 121, temperature 98.3 °F (36.8 °C), temperature source Temporal, resp. rate 17, height 5' 4.02\" (1.626 m), weight 144 lb 10 oz (65.6 kg), SpO2 97%.    Physical Exam:    General: Alert comfortable in no acute distress on 2 L nasal cannula  HEENT: Normocephalic, anicteric sclera   Neck: supple  Cardiac: Regular rate and rhythm. S1, S2 normal. No murmurs thrills or rubs  Lungs: Coarse breath sounds bilat  Abdomen: Soft, non-distended  Extremities: Without clubbing, cyanosis or edema.   Skin: Warm and dry.       Results:   Results:    Labs:  Recent Labs   Lab 10/08/24  0949 10/09/24  0643 10/10/24  0345 10/11/24  0404 10/12/24  033   WBC 21.2* 19.1* 20.0* 18.6* 20.5*   HGB 7.7* 7.9* 9.6* 7.9* 8.1*   MCV 91.2 93.8 92.4 94.2 96.3   .0 330.0 372.0 437.0 448.0       Recent Labs   Lab 10/09/24  0643 10/10/24  0345 10/11/24  0404 10/12/24  0332   * 189* 234* 190*   BUN 87* 43* 86* 48*   CREATSERUM 7.07* 4.59* 6.95* 4.23*   CA 9.9 9.9 9.9 10.3   ALB 3.6 3.9 3.5  --    * 141 145 142   K 4.7 4.8 4.6 4.5   CL 95* 100 103 104   CO2 25.0 27.0 31.0 27.0   ALKPHO 132* 130* 115  --    AST 53* 43* 29  --    ALT <7* <7* <7*  --    BILT 0.3 0.3 0.3  --    TP 6.8 7.3 6.7  --        Estimated Creatinine Clearance: 12.2 mL/min (A) (based on SCr of 4.23 mg/dL (H)).    No results for input(s):  \"PTP\", \"INR\" in the last 168 hours.         Culture:  Hospital Encounter on 09/28/24   1. Blood Culture     Status: None    Collection Time: 09/30/24 10:03 AM    Specimen: Bld,Arterial Line; Blood   Result Value Ref Range    Blood Culture Result No Growth 5 Days N/A       Cardiac  No results for input(s): \"TROP\", \"PBNP\" in the last 168 hours.      Imaging: Imaging data reviewed in [facility].  XR CHEST AP PORTABLE  (CPT=71045)    Result Date: 10/12/2024  CONCLUSION: No acute cardiopulmonary abnormality.    Dictated by (CST): Oseas Good MD on 10/12/2024 at 7:42 AM     Finalized by (CST): Oseas Good MD on 10/12/2024 at 7:42 AM           Medications:    ipratropium-albuterol  3 mL Nebulization 2 times daily    insulin degludec  10 Units Subcutaneous Daily    insulin aspart  3 Units Subcutaneous TID CC    methylPREDNISolone  40 mg Intravenous BID    clopidogrel  75 mg Oral Daily    carvedilol  25 mg Oral BID    sevelamer carbonate  800 mg Oral TID CC    insulin aspart  1-7 Units Subcutaneous TID CC and HS    atorvastatin  40 mg Oral Nightly    epoetin donna  5,000 Units Subcutaneous Once per day on Monday Wednesday Friday    heparin  5,000 Units Subcutaneous Q8H STEPHANIE    senna  10 mL Oral BID    docusate  100 mg Oral BID    mineral oil-white petrolatum  1 Application Both Eyes Nightly    lidocaine-menthol  2 patch Transdermal Daily    pantoprazole  40 mg Intravenous Q12H    cetirizine  5 mg Per NG Tube Daily    fluticasone-salmeterol  1 puff Inhalation BID         Assessment and Plan:   Assessment & Plan:      Acute hypoxic respiratory failure   Hypotension   Aspiration PNA  Pulmonary and cardiology on consult.   Secondary to pulmonary edema and aspiration  Intubated 9/29- extubated on 10/6/2024  Empiric zosyn.  Completed 5 days  Sputum cx normal julio c. Blood cx x 4 NGTD  Volume management per nephrology   CXR pulmonary edema. Improved alveolar opacities.   10/6: extubated  10/8: now on 2L O2NC, repeat CXR, SLP, PT ,  OT  10/9- now struggling to breathe. Will be transferred to the ICU. Needs emergent dialysis. Discussed with Pulm and nursing   10/10- feeling well monitor in the icu one more day    10/11- can be transferred to the floor. Tolerating diet. On room air.   10/12- will hopefully be discharged to rehab Monday         ESRD   HD per nephrology MW  Nephro on consult.   10/7: s/p HD   10/11- dialysis tomorrow   10/12- dialysis today 3 liters to be removed today   On epogen and IV protonix         Hip fracture   S/p fall PTA -status post left hip arthroplasty on 10/4/2024  Orthopedic surgery on consult.   High risk for surgery  however surgery is necessary.   Pain control         DM II   A1c   ISS     Paroxysmal A.fib - currently NSR   Acute on chronic HFpEF  CAD s/p jodi circ 5/24'   Elevate troponin secondary to demand ischemia   S/p watchman device 9/11/24  Cont coreg, statin  No further cardiac testing prior to planned surgery.   Resume ASA/Plavix once safely able to do so.     Coffee ground emesis   Resolved.   IV protonix BID   Hb stable. Resume plavix. Resume aspirin if Hb remains stable.       >55min spent, >50% spent counseling and coordinating care in the form of educating pt/family and d/w consultants and staff. Most of the time spent discussing the above plan.        Plan of care discussed with patient or family at bedside.    Ne Irizarry MD          Supplementary Documentation:     Quality:  DVT Prophylaxis: heparin   CODE status: Full  Dispo: per clinical course            Estimated date of discharge: TBD  Discharge is dependent on: clinical stability  At this point Mr. Hernández is expected to be discharge to: TBD        Ne Irizarry MD

## 2024-10-12 NOTE — PROGRESS NOTES
Pulmonary Progress Note        NAME: Ollie Hernández - ROOM: [address] [place] [address]-A - MRN: [id number] - Age: 79 year old - : 1945    Past Medical History:    Anemia    Asthma (HCC)    Back problem    BPH (benign prostatic hyperplasia)    Calculus of kidney    Cataract    Coronary atherosclerosis    Diabetes (HCC)    ESRD (end stage renal disease) on dialysis (HCC)    Essential hypertension    High blood pressure    High cholesterol    History of blood transfusion    Hyperlipidemia    Neuropathy    hands and feet    KRAIG on CPAP    Renal disorder    Sleep apnea    Visual impairment    glasses    Vocal cord paralysis, unilateral partial         SUBJECTIVE: No new events overnight, feels better     OBJECTIVE:    Oxygen Therapy  SpO2: 97 %  O2 Device: None (Room air)  Mode: Spontaneous/Timed  ETCO2 (mmHg): 2 mmHg  FiO2 (%): 26 %  O2 Flow Rate (L/min): 3 L/min  Pulse Oximetry Type: Continuous  Oximetry Probe Site Changed: No  Pulse Ox Probe Location: Right hand                  Intake/Output Summary (Last 24 hours) at 10/12/2024 0832  Last data filed at 10/11/2024 1400  Gross per 24 hour   Intake 120 ml   Output 0 ml   Net 120 ml       Scheduled Medication:   ipratropium-albuterol  3 mL Nebulization 2 times daily    insulin degludec  10 Units Subcutaneous Daily    insulin aspart  3 Units Subcutaneous TID CC    methylPREDNISolone  40 mg Intravenous BID    clopidogrel  75 mg Oral Daily    carvedilol  25 mg Oral BID    sevelamer carbonate  800 mg Oral TID CC    insulin aspart  1-7 Units Subcutaneous TID CC and HS    atorvastatin  40 mg Oral Nightly    epoetin donna  5,000 Units Subcutaneous Once per day on     heparin  5,000 Units Subcutaneous Q8H STEPHANIE    senna  10 mL Oral BID    docusate  100 mg Oral BID    mineral oil-white petrolatum  1 Application Both Eyes Nightly    lidocaine-menthol  2 patch Transdermal Daily    pantoprazole  40 mg Intravenous Q12H    cetirizine  5 mg Per NG Tube Daily     fluticasone-salmeterol  1 puff Inhalation BID     Continuous Infusing Medication:   sodium chloride      sodium chloride      dextrose 10% Stopped (10/05/24 1910)       /72 (BP Location: Left arm)   Pulse (!) 121   Temp 98 °F (36.7 °C) (Temporal)   Resp 17   Ht 5' 4.02\" (1.626 m)   Wt 144 lb 10 oz (65.6 kg)   SpO2 97%   BMI 24.81 kg/m²   General:  Alert, no distress, no stridor    Lungs:   Clear to auscultation bilaterally.   Heart:   Regular rhythm    Abdomen:   Soft, non-tender, non distended   Extremities:  no cyanosis or edema.   Neurologic: Oriented  Times 3       Recent Labs   Lab 10/09/24  0643 10/10/24  0345 10/11/24  0404 10/12/24  0332   RBC 2.57* 3.17* 2.60* 2.71*   HGB 7.9* 9.6* 7.9* 8.1*   HCT 24.1* 29.3* 24.5* 26.1*   MCV 93.8 92.4 94.2 96.3   MCH 30.7 30.3 30.4 29.9   MCHC 32.8 32.8 32.2 31.0   RDW 15.5* 15.3* 15.7* 15.6*   NEPRELIM 17.90* 18.10* 16.87*  --    WBC 19.1* 20.0* 18.6* 20.5*   .0 372.0 437.0 448.0         Recent Labs   Lab 10/10/24  0345 10/11/24  0404 10/12/24  0332   * 234* 190*   BUN 43* 86* 48*   CREATSERUM 4.59* 6.95* 4.23*   EGFRCR 12* 8* 14*   CA 9.9 9.9 10.3    145 142   K 4.8 4.6 4.5    103 104   CO2 27.0 31.0 27.0     Lab Results   Component Value Date    INR 1.45 (H) 08/09/2024             ASSESSMENT/PLAN:       Acute respiratory failure - due to pulmonary edema and/or aspiration. Intubated 9/29. CXR with interval improvement.  Extubated 10/6 with post-extubation stridor prompting transfer back to ICU 10/10.  Currently on room air with no distress or stridor   - Ok to stop steroids 10/13 (order placed)  ESRD - hyponatremic on labs today  - HD per nephrology  KRAIG  - on CPAP at home, will resume here   Asthma  -BD  - Advair in lieu of Symbicort  CV: h/o pAFib  - S/p watchman 9/2024.   - Per cardiology.   Hip fracture after fall  - s/p L FRANCE 10/4  - per ortho.   Ppx  - subcu heparin  - PPI  Advanced directives -  full   Dispo - stable   - now  floor status   -will follow PRN    Jayjay Mijares MD

## 2024-10-12 NOTE — PROGRESS NOTES
[facility]  part of [facility]    Progress Note    Ollie Hernández Patient Status:  Inpatient    1945 MRN [id number]   Location [facility] 2W/SW Attending Pranay Michel MD   Hosp Day # 14 PCP Wili Parmar MD     Chief Complaint:   Chief Complaint   Patient presents with    Fall       Subjective:   Ollie Moyous   Status post left hip arthroplasty on 10/4/2024.      Patient successfully extubated 10/6/2024.    Patient is struggling to breathe. Concern for aspiration.     Patient now on room air. Feeling a lot better.   Patient is resting in bed. Wife is at bedside.       Objective:   Objective:    Blood pressure 129/72, pulse (!) 121, temperature 98.3 °F (36.8 °C), temperature source Temporal, resp. rate 17, height 5' 4.02\" (1.626 m), weight 144 lb 10 oz (65.6 kg), SpO2 97%.    Physical Exam:    General: Alert comfortable in no acute distress on 2 L nasal cannula  HEENT: Normocephalic, anicteric sclera   Neck: supple  Cardiac: Regular rate and rhythm. S1, S2 normal. No murmurs thrills or rubs  Lungs: Coarse breath sounds bilat  Abdomen: Soft, non-distended  Extremities: Without clubbing, cyanosis or edema.   Skin: Warm and dry.       Results:   Results:    Labs:  Recent Labs   Lab 10/08/24  0949 10/09/24  0643 10/10/24  0345 10/11/24  0404 10/12/24  033   WBC 21.2* 19.1* 20.0* 18.6* 20.5*   HGB 7.7* 7.9* 9.6* 7.9* 8.1*   MCV 91.2 93.8 92.4 94.2 96.3   .0 330.0 372.0 437.0 448.0       Recent Labs   Lab 10/09/24  0643 10/10/24  0345 10/11/24  0404 10/12/24  0332   * 189* 234* 190*   BUN 87* 43* 86* 48*   CREATSERUM 7.07* 4.59* 6.95* 4.23*   CA 9.9 9.9 9.9 10.3   ALB 3.6 3.9 3.5  --    * 141 145 142   K 4.7 4.8 4.6 4.5   CL 95* 100 103 104   CO2 25.0 27.0 31.0 27.0   ALKPHO 132* 130* 115  --    AST 53* 43* 29  --    ALT <7* <7* <7*  --    BILT 0.3 0.3 0.3  --    TP 6.8 7.3 6.7  --        Estimated Creatinine Clearance: 12.2 mL/min (A) (based on SCr of  4.23 mg/dL (H)).    No results for input(s): \"PTP\", \"INR\" in the last 168 hours.         Culture:  Hospital Encounter on 09/28/24   1. Blood Culture     Status: None    Collection Time: 09/30/24 10:03 AM    Specimen: Bld,Arterial Line; Blood   Result Value Ref Range    Blood Culture Result No Growth 5 Days N/A       Cardiac  No results for input(s): \"TROP\", \"PBNP\" in the last 168 hours.      Imaging: Imaging data reviewed in [facility].  XR CHEST AP PORTABLE  (CPT=71045)    Result Date: 10/12/2024  CONCLUSION: No acute cardiopulmonary abnormality.    Dictated by (CST): Oseas Good MD on 10/12/2024 at 7:42 AM     Finalized by (CST): Oseas Good MD on 10/12/2024 at 7:42 AM           Medications:    ipratropium-albuterol  3 mL Nebulization 2 times daily    insulin degludec  10 Units Subcutaneous Daily    insulin aspart  3 Units Subcutaneous TID CC    methylPREDNISolone  40 mg Intravenous BID    clopidogrel  75 mg Oral Daily    carvedilol  25 mg Oral BID    sevelamer carbonate  800 mg Oral TID CC    insulin aspart  1-7 Units Subcutaneous TID CC and HS    atorvastatin  40 mg Oral Nightly    epoetin donna  5,000 Units Subcutaneous Once per day on Monday Wednesday Friday    heparin  5,000 Units Subcutaneous Q8H STEPHANIE    senna  10 mL Oral BID    docusate  100 mg Oral BID    mineral oil-white petrolatum  1 Application Both Eyes Nightly    lidocaine-menthol  2 patch Transdermal Daily    pantoprazole  40 mg Intravenous Q12H    cetirizine  5 mg Per NG Tube Daily    fluticasone-salmeterol  1 puff Inhalation BID         Assessment and Plan:   Assessment & Plan:      Acute hypoxic respiratory failure   Hypotension   Aspiration PNA  Pulmonary and cardiology on consult.   Secondary to pulmonary edema and aspiration  Intubated 9/29- extubated on 10/6/2024  Empiric zosyn.  Completed 5 days  Sputum cx normal julio c. Blood cx x 4 NGTD  Volume management per nephrology   CXR pulmonary edema. Improved alveolar opacities.   10/6: extubated  10/8:  now on 2L O2NC, repeat CXR, SLP, PT , OT  10/9- now struggling to breathe. Will be transferred to the ICU. Needs emergent dialysis. Discussed with Pulm and nursing   10/10- feeling well monitor in the icu one more day    10/11- can be transferred to the floor. Tolerating diet. On room air.         ESRD   HD per nephrology MWF  Nephro on consult.   10/7: s/p HD   10/11- dialysis tomorrow         Hip fracture   S/p fall PTA -status post left hip arthroplasty on 10/4/2024  Orthopedic surgery on consult.   High risk for surgery  however surgery is necessary.   Pain control         DM II   A1c   ISS     Paroxysmal A.fib - currently NSR   Acute on chronic HFpEF  CAD s/p jodi circ 5/24'   Elevate troponin secondary to demand ischemia   S/p watchman device 9/11/24  Cont coreg, statin  No further cardiac testing prior to planned surgery.   Resume ASA/Plavix once safely able to do so.     Coffee ground emesis   Resolved.   IV protonix BID   Hb stable. Resume plavix. Resume aspirin if Hb remains stable.       >55min spent, >50% spent counseling and coordinating care in the form of educating pt/family and d/w consultants and staff. Most of the time spent discussing the above plan.        Plan of care discussed with patient or family at bedside.    Ne Irizarry MD          Supplementary Documentation:     Quality:  DVT Prophylaxis: heparin   CODE status: Full  Dispo: per clinical course            Estimated date of discharge: TBD  Discharge is dependent on: clinical stability  At this point Mr. Hernández is expected to be discharge to: TBD        Ne Irizarry MD

## 2024-10-12 NOTE — PROGRESS NOTES
[facility]      [facility] Cardiology Progress Note    Ollie Hernández Patient Status:  Inpatient    1945 MRN [id number]   Location [facility] 2W/SW Attending Pranay Michel MD   Hosp Day # 14 PCP Wili Parmar MD       Impression/Plan:  79 year old male presenting with:    Impression:  Hip fracture Left - S/P L anterior total hip arthroplasty 10/4/24  Acute resp failure, aspiration and/or pulmonary edema; intubated 2024 extubated 10/6  -Failed weaning trial yesterday  ESRD on HD  DM  PAF, currently SR. S/p watchman device 24  HTN  HL, on statin PTA  Acute on chronic diastolic CHF  CAD s/p [place] circ 24   Coffee grounds in OG tube  -Slight decline in Hb post-op  Elevated troponin, likely demand ischemia    Plan:  - Cont carvedilol for rate control BP. Follow BP as restarts PRN labetalol    - Cont statin   - Plavix restarted 10/8 Hb stable taking PO  monitor for bleeding (recent Watchman implant 2024) consider restarting ASA 81 if Hb con't to remain stable   - HD as per nephrology  - Vent management as per pulm > on RA    - Monitor on tele  - ECG reviewed: sinus tach  - LE venous doppler negative 10/12/24  - await CXR              Subjective:     Denies dyspnea or chest pain. No palpitations.          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)    Atherosclerosis of native arteries of extremities with intermittent claudication, bilateral legs (HCC)    Primary  hypertension    Smokers' cough (HCC)    Unstable angina (HCC)    Lower GI bleed    ESRD (end stage renal disease) on dialysis (HCC)    PAF (paroxysmal atrial fibrillation) ([facility])    AVM (arteriovenous malformation) of colon    ABLA (acute blood loss anemia)    Rectal bleeding    Anticoagulated    Antiplatelet or antithrombotic long-term use    Lower GI bleeding    ESRD on hemodialysis (HCC)    GI bleed    Closed displaced midcervical fracture of left femur with delayed healing    ESRD (end stage renal disease) (HCC)    Closed fracture of left hip ([facility])    Hyperphosphatemia    Respiratory failure (HCC)    Anemia       Objective:   Temp: 98.5 °F (36.9 °C)  Pulse: 78  Resp: 22  BP: 135/73    Intake/Output:     Intake/Output Summary (Last 24 hours) at 10/12/2024 0512  Last data filed at 10/11/2024 1400  Gross per 24 hour   Intake 220 ml   Output 0 ml   Net 220 ml       Last 3 Weights   10/05/24 0600 144 lb 10 oz (65.6 kg)   10/04/24 0600 145 lb 8.1 oz (66 kg)   10/02/24 1310 156 lb 4.9 oz (70.9 kg)   10/02/24 0800 143 lb 11.8 oz (65.2 kg)   10/01/24 0600 160 lb 0.9 oz (72.6 kg)   09/30/24 0400 164 lb 0.4 oz (74.4 kg)   09/28/24 1649 155 lb 11.2 oz (70.6 kg)   08/22/24 1311 159 lb 12.8 oz (72.5 kg)   08/20/24 0933 158 lb (71.7 kg)       Tele: SR/PACs, sinus tach    Physical Exam:    General: Awake   HEENT: Normocephalic, anicteric sclera NG tube out   Neck: supple  Cardiac: Regular, tachy. S1, S2 normal. No murmur, pericardial rub, S3, thrill, heave or extra cardiac sounds.  Lungs: Coarse breath sounds bilat  Abdomen: Soft, non-distended  Extremities: Without clubbing, cyanosis or edema.  SCD boots   Neurologic: Alert+confused   Skin: Warm and dry.     Laboratory/Data:    Labs:         Recent Labs   Lab 10/08/24  0949 10/09/24  0643 10/10/24  0345 10/11/24  0404 10/12/24  0332   WBC 21.2* 19.1* 20.0* 18.6* 20.5*   HGB 7.7* 7.9* 9.6* 7.9* 8.1*   MCV 91.2 93.8 92.4 94.2 96.3   .0 330.0 372.0 437.0 448.0       Recent  Labs   Lab 10/07/24  0409 10/08/24  0957 10/09/24  0643 10/10/24  0345 10/11/24  0404 10/12/24  0332   * 134* 134* 141 145 142   K 4.6 4.7 4.7 4.8 4.6 4.5   CL 92* 96* 95* 100 103 104   CO2 22.0 26.0 25.0 27.0 31.0 27.0   BUN 65* 61* 87* 43* 86* 48*   CREATSERUM 6.84* 5.86* 7.07* 4.59* 6.95* 4.23*   CA 10.0 10.4 9.9 9.9 9.9 10.3   PHOS 9.6* 8.6*  --  7.7*  --   --    * 176* 256* 189* 234* 190*       Recent Labs   Lab 10/07/24  0409 10/09/24  0643 10/10/24  0345 10/11/24  0404   ALT  --  <7* <7* <7*   AST  --  53* 43* 29   ALB 3.6 3.6 3.9 3.5       No results for input(s): \"TROP\" in the last 168 hours.    Allergies:   Allergies   Allergen Reactions    Adhesive Tape OTHER (SEE COMMENTS)     Severe rashes    Dust Mite Extract RASH       Medications:  Current Facility-Administered Medications   Medication Dose Route Frequency    insulin degludec (Tresiba) 100 units/mL flextouch 10 Units  10 Units Subcutaneous Daily    insulin aspart (NovoLOG) 100 Units/mL FlexPen 3 Units  3 Units Subcutaneous TID CC    methylPREDNISolone sodium succinate (Solu-MEDROL) injection 40 mg  40 mg Intravenous BID    sodium chloride 0.9 % IV bolus 100 mL  100 mL Intravenous Q30 Min PRN    And    albumin human (Albumin) 25% injection 25 g  25 g Intravenous PRN Dialysis    influenza virus trivalent high dose PF (Fluzone HD) 0.5 mL injection (ages >/= 65 years) 0.5 mL  0.5 mL Intramuscular Prior to discharge    ipratropium-albuterol (Duoneb) 0.5-2.5 (3) MG/3ML inhalation solution 3 mL  3 mL Nebulization 4 times per day    clopidogrel (Plavix) tab 75 mg  75 mg Oral Daily    carvedilol (Coreg) tab 25 mg  25 mg Oral BID    sevelamer carbonate (Renvela) tab 800 mg  800 mg Oral TID CC    insulin aspart (NovoLOG) 100 Units/mL FlexPen 1-7 Units  1-7 Units Subcutaneous TID CC and HS    atorvastatin (Lipitor) tab 40 mg  40 mg Oral Nightly    HYDROmorphone (Dilaudid) 1 MG/ML injection 0.1 mg  0.1 mg Intravenous Q2H PRN    Or    HYDROmorphone  (Dilaudid) 1 MG/ML injection 0.2 mg  0.2 mg Intravenous Q2H PRN    Or    HYDROmorphone (Dilaudid) 1 MG/ML injection 0.4 mg  0.4 mg Intravenous Q2H PRN    epoetin donna (Epogen, Procrit) 5,000 Units injection  5,000 Units Subcutaneous Once per day on Monday Wednesday Friday    metoclopramide (Reglan) 5 mg/mL injection 10 mg  10 mg Intravenous Q24H PRN    sodium chloride 0.9 % irrigation    Continuous PRN    sodium chloride 0.9 % irrigation    Continuous PRN    ondansetron (Zofran) 4 MG/2ML injection 4 mg  4 mg Intravenous Q6H PRN    diphenhydrAMINE (Benadryl) cap/tab 25 mg  25 mg Oral Q4H PRN    Or    diphenhydrAMINE (Benadryl) 50 mg/mL  injection 12.5 mg  12.5 mg Intravenous Q4H PRN    heparin (Porcine) 5000 UNIT/ML injection 5,000 Units  5,000 Units Subcutaneous Q8H STEPHANIE    dextrose 10% infusion (TPN no rate)   Intravenous Continuous PRN    pancrelipase (Lip-Prot-Amyl) (Zenpep) DR particles cap 10,000 Units  10,000 Units Per G Tube PRN    And    sodium bicarbonate tab 325 mg  325 mg Oral PRN    senna (Senokot) 8.8 MG/5ML oral syrup 17.6 mg  10 mL Oral BID    docusate (Colace) 50 MG/5ML oral solution 100 mg  100 mg Oral BID    mineral oil-white petrolatum (Artificial Tears) 83-15 % ophthalmic ointment 1 Application  1 Application Both Eyes Nightly    lidocaine-menthol 4-1 % patch 2 patch  2 patch Transdermal Daily    pantoprazole (Protonix) 40 mg in sodium chloride 0.9% PF 10 mL IV push  40 mg Intravenous Q12H    hydrALAzine (Apresoline) 20 mg/mL injection 10 mg  10 mg Intravenous Q6H PRN    labetalol (Trandate) 5 mg/mL injection 10 mg  10 mg Intravenous Q4H PRN    cetirizine (ZyrTEC) tab 5 mg  5 mg Per NG Tube Daily    acetaminophen (Tylenol) 160 MG/5ML oral liquid 500 mg  500 mg Per NG Tube Daily PRN    acetaminophen (Tylenol) tab 650 mg  650 mg Oral Q4H PRN    Or    acetaminophen (Tylenol) 160 MG/5ML oral liquid 650 mg  650 mg Per NG Tube Q4H PRN    Or    acetaminophen (Tylenol) rectal suppository 650 mg  650 mg  Rectal Q4H PRN    polyethylene glycol (PEG 3350) (Miralax) 17 g oral packet 17 g  17 g Per NG Tube Daily PRN    heparin (Porcine) 1000 UNIT/ML injection 1,500 Units  1.5 mL Intravenous PRN Dialysis    lidocaine-prilocaine (Emla) 2.5-2.5 % cream   Topical PRN    bisacodyl (Dulcolax) 10 MG rectal suppository 10 mg  10 mg Rectal Daily PRN    albuterol (Ventolin HFA) 108 (90 Base) MCG/ACT inhaler 2 puff  2 puff Inhalation Q4H PRN    fluticasone propionate (Flonase) 50 MCG/ACT nasal suspension 2 spray  2 spray Nasal Daily PRN    glucose (Dex4) 15 GM/59ML oral liquid 15 g  15 g Oral Q15 Min PRN    Or    glucose (Glutose) 40% oral gel 15 g  15 g Oral Q15 Min PRN    Or    glucose-vitamin C (Dex-4) chewable tab 4 tablet  4 tablet Oral Q15 Min PRN    Or    dextrose 50% injection 50 mL  50 mL Intravenous Q15 Min PRN    Or    glucose (Dex4) 15 GM/59ML oral liquid 30 g  30 g Oral Q15 Min PRN    Or    glucose (Glutose) 40% oral gel 30 g  30 g Oral Q15 Min PRN    Or    glucose-vitamin C (Dex-4) chewable tab 8 tablet  8 tablet Oral Q15 Min PRN    fluticasone-salmeterol (Advair Diskus) 250-50 MCG/ACT inhaler 1 puff  1 puff Inhalation BID

## 2024-10-13 LAB
ANION GAP SERPL CALC-SCNC: 13 MMOL/L (ref 0–18)
ATRIAL RATE: 79 BPM
BUN BLD-MCNC: 80 MG/DL (ref 9–23)
BUN/CREAT SERPL: 12.4 (ref 10–20)
CALCIUM BLD-MCNC: 10.3 MG/DL (ref 8.7–10.4)
CHLORIDE SERPL-SCNC: 103 MMOL/L (ref 98–112)
CO2 SERPL-SCNC: 25 MMOL/L (ref 21–32)
CREAT BLD-MCNC: 6.45 MG/DL
DEPRECATED RDW RBC AUTO: 53.2 FL (ref 35.1–46.3)
EGFRCR SERPLBLD CKD-EPI 2021: 8 ML/MIN/1.73M2 (ref 60–?)
ERYTHROCYTE [DISTWIDTH] IN BLOOD BY AUTOMATED COUNT: 15.7 % (ref 11–15)
GLUCOSE BLD-MCNC: 198 MG/DL (ref 70–99)
GLUCOSE BLDC GLUCOMTR-MCNC: 186 MG/DL (ref 70–99)
GLUCOSE BLDC GLUCOMTR-MCNC: 239 MG/DL (ref 70–99)
GLUCOSE BLDC GLUCOMTR-MCNC: 253 MG/DL (ref 70–99)
GLUCOSE BLDC GLUCOMTR-MCNC: 261 MG/DL (ref 70–99)
HCT VFR BLD AUTO: 26.2 %
HGB BLD-MCNC: 8.5 G/DL
MCH RBC QN AUTO: 30.4 PG (ref 26–34)
MCHC RBC AUTO-ENTMCNC: 32.4 G/DL (ref 31–37)
MCV RBC AUTO: 93.6 FL
OSMOLALITY SERPL CALC.SUM OF ELEC: 322 MOSM/KG (ref 275–295)
P AXIS: 72 DEGREES
P-R INTERVAL: 110 MS
PLATELET # BLD AUTO: 452 10(3)UL (ref 150–450)
POTASSIUM SERPL-SCNC: 4.9 MMOL/L (ref 3.5–5.1)
Q-T INTERVAL: 396 MS
QRS DURATION: 96 MS
QTC CALCULATION (BEZET): 454 MS
R AXIS: 30 DEGREES
RBC # BLD AUTO: 2.8 X10(6)UL
SODIUM SERPL-SCNC: 141 MMOL/L (ref 136–145)
T AXIS: 94 DEGREES
VENTRICULAR RATE: 79 BPM
WBC # BLD AUTO: 19.3 X10(3) UL (ref 4–11)

## 2024-10-13 PROCEDURE — 99233 SBSQ HOSP IP/OBS HIGH 50: CPT | Performed by: INTERNAL MEDICINE

## 2024-10-13 PROCEDURE — 99233 SBSQ HOSP IP/OBS HIGH 50: CPT | Performed by: HOSPITALIST

## 2024-10-13 RX ORDER — HYDROCODONE BITARTRATE AND ACETAMINOPHEN 5; 325 MG/1; MG/1
1 TABLET ORAL EVERY 6 HOURS PRN
Status: DISCONTINUED | OUTPATIENT
Start: 2024-10-13 | End: 2024-10-13

## 2024-10-13 RX ORDER — ALBUMIN (HUMAN) 12.5 G/50ML
25 SOLUTION INTRAVENOUS
Status: ACTIVE | OUTPATIENT
Start: 2024-10-13 | End: 2024-10-15

## 2024-10-13 NOTE — PROGRESS NOTES
[facility]      [facility] Cardiology Progress Note    Ollie Hernández Patient Status:  Inpatient    1945 MRN [id number]   Location [facility] 2W/SW Attending Pranay Michel MD   Hosp Day # 15 PCP Wili Parmar MD       Impression/Plan:  79 year old male presenting with:    Impression:  Hip fracture Left - S/P L anterior total hip arthroplasty 10/4/24  Acute resp failure, aspiration and/or pulmonary edema; intubated 2024 extubated 10/6  -Failed weaning trial yesterday  ESRD on HD  DM  PAF, currently SR. S/p watchman device 24  HTN  HL, on statin PTA  Acute on chronic diastolic CHF  CAD s/p [place] circ 24   Coffee grounds in OG tube  -Slight decline in Hb post-op  Elevated troponin, likely demand ischemia    Plan:  - Cont carvedilol for rate control BP. Follow BP as restarts PRN labetalol    - Cont statin   - Plavix restarted 10/8 Hb stable taking PO  monitor for bleeding (recent Watchman implant 2024) consider restarting ASA 81 if Hb con't to remain stable   - HD as per nephrology  - Monitor on tele                  Subjective:     Denies dyspnea or chest pain.           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)    Atherosclerosis of native arteries of extremities with intermittent claudication, bilateral legs (HCC)    Primary hypertension    Smokers' cough (HCC)    Unstable angina (HCC)    Lower GI bleed    ESRD (end stage renal disease) on dialysis (HCC)     PAF (paroxysmal atrial fibrillation) ([facility])    AVM (arteriovenous malformation) of colon    ABLA (acute blood loss anemia)    Rectal bleeding    Anticoagulated    Antiplatelet or antithrombotic long-term use    Lower GI bleeding    ESRD on hemodialysis ([facility])    GI bleed    Closed displaced midcervical fracture of left femur with delayed healing    ESRD (end stage renal disease) ([facility])    Closed fracture of left hip ([facility])    Hyperphosphatemia    Respiratory failure (HCC)    Anemia       Objective:   Temp: 98.1 °F (36.7 °C)  Pulse: 70  Resp: 18  BP: 149/69    Intake/Output:   No intake or output data in the 24 hours ending 10/13/24 0635      Last 3 Weights   10/05/24 0600 144 lb 10 oz (65.6 kg)   10/04/24 0600 145 lb 8.1 oz (66 kg)   10/02/24 1310 156 lb 4.9 oz (70.9 kg)   10/02/24 0800 143 lb 11.8 oz (65.2 kg)   10/01/24 0600 160 lb 0.9 oz (72.6 kg)   09/30/24 0400 164 lb 0.4 oz (74.4 kg)   09/28/24 1649 155 lb 11.2 oz (70.6 kg)   08/22/24 1311 159 lb 12.8 oz (72.5 kg)   08/20/24 0933 158 lb (71.7 kg)       Tele: SR/PACs, sinus tach    Physical Exam:    General: Awake   HEENT: Normocephalic, anicteric sclera NG tube out   Neck: supple  Cardiac: Regular rhythm. S1, S2 normal. No murmur, pericardial rub, S3, thrill, heave or extra cardiac sounds.  Lungs: Coarse breath sounds bilat  Abdomen: Soft, non-distended  Extremities: Without clubbing, cyanosis or edema.  SCD boots   Neurologic: Alert+confused   Skin: Warm and dry.     Laboratory/Data:    Labs:         Recent Labs   Lab 10/09/24  0643 10/10/24  0345 10/11/24  0404 10/12/24  0332 10/13/24  0442   WBC 19.1* 20.0* 18.6* 20.5* 19.3*   HGB 7.9* 9.6* 7.9* 8.1* 8.5*   MCV 93.8 92.4 94.2 96.3 93.6   .0 372.0 437.0 448.0 452.0*       Recent Labs   Lab 10/07/24  0409 10/08/24  0957 10/09/24  0643 10/10/24  0345 10/11/24  0404 10/12/24  0332 10/13/24  0442   * 134* 134* 141 145 142 141   K 4.6 4.7 4.7 4.8 4.6 4.5 4.9   CL 92* 96* 95* 100 103 104 103   CO2 22.0  26.0 25.0 27.0 31.0 27.0 25.0   BUN 65* 61* 87* 43* 86* 48* 80*   CREATSERUM 6.84* 5.86* 7.07* 4.59* 6.95* 4.23* 6.45*   CA 10.0 10.4 9.9 9.9 9.9 10.3 10.3   PHOS 9.6* 8.6*  --  7.7*  --   --   --    * 176* 256* 189* 234* 190* 198*       Recent Labs   Lab 10/07/24  0409 10/09/24  0643 10/10/24  0345 10/11/24  0404   ALT  --  <7* <7* <7*   AST  --  53* 43* 29   ALB 3.6 3.6 3.9 3.5       No results for input(s): \"TROP\" in the last 168 hours.    Allergies:   Allergies   Allergen Reactions    Adhesive Tape OTHER (SEE COMMENTS)     Severe rashes    Dust Mite Extract RASH       Medications:  Current Facility-Administered Medications   Medication Dose Route Frequency    ipratropium-albuterol (Duoneb) 0.5-2.5 (3) MG/3ML inhalation solution 3 mL  3 mL Nebulization 2 times daily    ipratropium-albuterol (Duoneb) 0.5-2.5 (3) MG/3ML inhalation solution 3 mL  3 mL Nebulization Q6H PRN    insulin degludec (Tresiba) 100 units/mL flextouch 10 Units  10 Units Subcutaneous Daily    insulin aspart (NovoLOG) 100 Units/mL FlexPen 3 Units  3 Units Subcutaneous TID CC    methylPREDNISolone sodium succinate (Solu-MEDROL) injection 40 mg  40 mg Intravenous BID    influenza virus trivalent high dose PF (Fluzone HD) 0.5 mL injection (ages >/= 65 years) 0.5 mL  0.5 mL Intramuscular Prior to discharge    clopidogrel (Plavix) tab 75 mg  75 mg Oral Daily    carvedilol (Coreg) tab 25 mg  25 mg Oral BID    sevelamer carbonate (Renvela) tab 800 mg  800 mg Oral TID CC    insulin aspart (NovoLOG) 100 Units/mL FlexPen 1-7 Units  1-7 Units Subcutaneous TID CC and HS    atorvastatin (Lipitor) tab 40 mg  40 mg Oral Nightly    HYDROmorphone (Dilaudid) 1 MG/ML injection 0.1 mg  0.1 mg Intravenous Q2H PRN    Or    HYDROmorphone (Dilaudid) 1 MG/ML injection 0.2 mg  0.2 mg Intravenous Q2H PRN    Or    HYDROmorphone (Dilaudid) 1 MG/ML injection 0.4 mg  0.4 mg Intravenous Q2H PRN    epoetin donna (Epogen, Procrit) 5,000 Units injection  5,000 Units  Subcutaneous Once per day on Monday Wednesday Friday    metoclopramide (Reglan) 5 mg/mL injection 10 mg  10 mg Intravenous Q24H PRN    sodium chloride 0.9 % irrigation    Continuous PRN    sodium chloride 0.9 % irrigation    Continuous PRN    ondansetron (Zofran) 4 MG/2ML injection 4 mg  4 mg Intravenous Q6H PRN    diphenhydrAMINE (Benadryl) cap/tab 25 mg  25 mg Oral Q4H PRN    Or    diphenhydrAMINE (Benadryl) 50 mg/mL  injection 12.5 mg  12.5 mg Intravenous Q4H PRN    heparin (Porcine) 5000 UNIT/ML injection 5,000 Units  5,000 Units Subcutaneous Q8H STEPHANIE    dextrose 10% infusion (TPN no rate)   Intravenous Continuous PRN    pancrelipase (Lip-Prot-Amyl) (Zenpep) DR particles cap 10,000 Units  10,000 Units Per G Tube PRN    And    sodium bicarbonate tab 325 mg  325 mg Oral PRN    senna (Senokot) 8.8 MG/5ML oral syrup 17.6 mg  10 mL Oral BID    docusate (Colace) 50 MG/5ML oral solution 100 mg  100 mg Oral BID    mineral oil-white petrolatum (Artificial Tears) 83-15 % ophthalmic ointment 1 Application  1 Application Both Eyes Nightly    lidocaine-menthol 4-1 % patch 2 patch  2 patch Transdermal Daily    pantoprazole (Protonix) 40 mg in sodium chloride 0.9% PF 10 mL IV push  40 mg Intravenous Q12H    hydrALAzine (Apresoline) 20 mg/mL injection 10 mg  10 mg Intravenous Q6H PRN    labetalol (Trandate) 5 mg/mL injection 10 mg  10 mg Intravenous Q4H PRN    cetirizine (ZyrTEC) tab 5 mg  5 mg Per NG Tube Daily    acetaminophen (Tylenol) 160 MG/5ML oral liquid 500 mg  500 mg Per NG Tube Daily PRN    acetaminophen (Tylenol) tab 650 mg  650 mg Oral Q4H PRN    Or    acetaminophen (Tylenol) 160 MG/5ML oral liquid 650 mg  650 mg Per NG Tube Q4H PRN    Or    acetaminophen (Tylenol) rectal suppository 650 mg  650 mg Rectal Q4H PRN    polyethylene glycol (PEG 3350) (Miralax) 17 g oral packet 17 g  17 g Per NG Tube Daily PRN    heparin (Porcine) 1000 UNIT/ML injection 1,500 Units  1.5 mL Intravenous PRN Dialysis    lidocaine-prilocaine  (Emla) 2.5-2.5 % cream   Topical PRN    bisacodyl (Dulcolax) 10 MG rectal suppository 10 mg  10 mg Rectal Daily PRN    albuterol (Ventolin HFA) 108 (90 Base) MCG/ACT inhaler 2 puff  2 puff Inhalation Q4H PRN    fluticasone propionate (Flonase) 50 MCG/ACT nasal suspension 2 spray  2 spray Nasal Daily PRN    glucose (Dex4) 15 GM/59ML oral liquid 15 g  15 g Oral Q15 Min PRN    Or    glucose (Glutose) 40% oral gel 15 g  15 g Oral Q15 Min PRN    Or    glucose-vitamin C (Dex-4) chewable tab 4 tablet  4 tablet Oral Q15 Min PRN    Or    dextrose 50% injection 50 mL  50 mL Intravenous Q15 Min PRN    Or    glucose (Dex4) 15 GM/59ML oral liquid 30 g  30 g Oral Q15 Min PRN    Or    glucose (Glutose) 40% oral gel 30 g  30 g Oral Q15 Min PRN    Or    glucose-vitamin C (Dex-4) chewable tab 8 tablet  8 tablet Oral Q15 Min PRN    fluticasone-salmeterol (Advair Diskus) 250-50 MCG/ACT inhaler 1 puff  1 puff Inhalation BID

## 2024-10-13 NOTE — PROGRESS NOTES
[facility]  part of [facility]    Progress Note      Subjective:     Patient sitting comfortably.  Ill-appearing but more perkier than yesterday.  Wife at bedside.  Finished breakfast this a.m. currently not on any oxygen    Review of Systems:     Limited - denies any cp . No abd.pain or NV    Objective:   Temp:  [97.8 °F (36.6 °C)-99.4 °F (37.4 °C)] 97.8 °F (36.6 °C)  Pulse:  [70-79] 79  Resp:  [16-19] 16  BP: (130-150)/(48-69) 131/55  SpO2:  [98 %-100 %] 100 %  SpO2: 100 %     Intake/Output Summary (Last 24 hours) at 10/13/2024 1309  Last data filed at 10/13/2024 0951  Gross per 24 hour   Intake 90 ml   Output --   Net 90 ml     Wt Readings from Last 3 Encounters:   10/05/24 144 lb 10 oz (65.6 kg)   08/22/24 159 lb 12.8 oz (72.5 kg)   08/20/24 158 lb (71.7 kg)       General appearance: Alert and awake.  Oriented.  Ill appearing and weak.   Head: Normocephalic, atraumatic  Eyes: conjunctivae/corneas clear  Throat: lips, mucosa, and tongue normal; teeth and gums normal  Neck:  no JVD, supple  Skin: No rashes or lesions  Neurologic: Grossly normal  Psychiatric: calm    Medications:  Current Facility-Administered Medications   Medication Dose Route Frequency    ipratropium-albuterol (Duoneb) 0.5-2.5 (3) MG/3ML inhalation solution 3 mL  3 mL Nebulization 2 times daily    ipratropium-albuterol (Duoneb) 0.5-2.5 (3) MG/3ML inhalation solution 3 mL  3 mL Nebulization Q6H PRN    insulin degludec (Tresiba) 100 units/mL flextouch 10 Units  10 Units Subcutaneous Daily    insulin aspart (NovoLOG) 100 Units/mL FlexPen 3 Units  3 Units Subcutaneous TID CC    influenza virus trivalent high dose PF (Fluzone HD) 0.5 mL injection (ages >/= 65 years) 0.5 mL  0.5 mL Intramuscular Prior to discharge    clopidogrel (Plavix) tab 75 mg  75 mg Oral Daily    carvedilol (Coreg) tab 25 mg  25 mg Oral BID    sevelamer carbonate (Renvela) tab 800 mg  800 mg Oral TID CC    insulin aspart (NovoLOG) 100 Units/mL FlexPen 1-7  Units  1-7 Units Subcutaneous TID CC and HS    atorvastatin (Lipitor) tab 40 mg  40 mg Oral Nightly    HYDROmorphone (Dilaudid) 1 MG/ML injection 0.1 mg  0.1 mg Intravenous Q2H PRN    Or    HYDROmorphone (Dilaudid) 1 MG/ML injection 0.2 mg  0.2 mg Intravenous Q2H PRN    Or    HYDROmorphone (Dilaudid) 1 MG/ML injection 0.4 mg  0.4 mg Intravenous Q2H PRN    epoetin donna (Epogen, Procrit) 5,000 Units injection  5,000 Units Subcutaneous Once per day on Monday Wednesday Friday    metoclopramide (Reglan) 5 mg/mL injection 10 mg  10 mg Intravenous Q24H PRN    sodium chloride 0.9 % irrigation    Continuous PRN    sodium chloride 0.9 % irrigation    Continuous PRN    ondansetron (Zofran) 4 MG/2ML injection 4 mg  4 mg Intravenous Q6H PRN    diphenhydrAMINE (Benadryl) cap/tab 25 mg  25 mg Oral Q4H PRN    Or    diphenhydrAMINE (Benadryl) 50 mg/mL  injection 12.5 mg  12.5 mg Intravenous Q4H PRN    heparin (Porcine) 5000 UNIT/ML injection 5,000 Units  5,000 Units Subcutaneous Q8H STEPHANIE    dextrose 10% infusion (TPN no rate)   Intravenous Continuous PRN    pancrelipase (Lip-Prot-Amyl) (Zenpep) DR particles cap 10,000 Units  10,000 Units Per G Tube PRN    And    sodium bicarbonate tab 325 mg  325 mg Oral PRN    senna (Senokot) 8.8 MG/5ML oral syrup 17.6 mg  10 mL Oral BID    docusate (Colace) 50 MG/5ML oral solution 100 mg  100 mg Oral BID    mineral oil-white petrolatum (Artificial Tears) 83-15 % ophthalmic ointment 1 Application  1 Application Both Eyes Nightly    lidocaine-menthol 4-1 % patch 2 patch  2 patch Transdermal Daily    pantoprazole (Protonix) 40 mg in sodium chloride 0.9% PF 10 mL IV push  40 mg Intravenous Q12H    hydrALAzine (Apresoline) 20 mg/mL injection 10 mg  10 mg Intravenous Q6H PRN    labetalol (Trandate) 5 mg/mL injection 10 mg  10 mg Intravenous Q4H PRN    cetirizine (ZyrTEC) tab 5 mg  5 mg Per NG Tube Daily    acetaminophen (Tylenol) 160 MG/5ML oral liquid 500 mg  500 mg Per NG Tube Daily PRN     acetaminophen (Tylenol) tab 650 mg  650 mg Oral Q4H PRN    Or    acetaminophen (Tylenol) 160 MG/5ML oral liquid 650 mg  650 mg Per NG Tube Q4H PRN    Or    acetaminophen (Tylenol) rectal suppository 650 mg  650 mg Rectal Q4H PRN    polyethylene glycol (PEG 3350) (Miralax) 17 g oral packet 17 g  17 g Per NG Tube Daily PRN    heparin (Porcine) 1000 UNIT/ML injection 1,500 Units  1.5 mL Intravenous PRN Dialysis    lidocaine-prilocaine (Emla) 2.5-2.5 % cream   Topical PRN    bisacodyl (Dulcolax) 10 MG rectal suppository 10 mg  10 mg Rectal Daily PRN    albuterol (Ventolin HFA) 108 (90 Base) MCG/ACT inhaler 2 puff  2 puff Inhalation Q4H PRN    fluticasone propionate (Flonase) 50 MCG/ACT nasal suspension 2 spray  2 spray Nasal Daily PRN    glucose (Dex4) 15 GM/59ML oral liquid 15 g  15 g Oral Q15 Min PRN    Or    glucose (Glutose) 40% oral gel 15 g  15 g Oral Q15 Min PRN    Or    glucose-vitamin C (Dex-4) chewable tab 4 tablet  4 tablet Oral Q15 Min PRN    Or    dextrose 50% injection 50 mL  50 mL Intravenous Q15 Min PRN    Or    glucose (Dex4) 15 GM/59ML oral liquid 30 g  30 g Oral Q15 Min PRN    Or    glucose (Glutose) 40% oral gel 30 g  30 g Oral Q15 Min PRN    Or    glucose-vitamin C (Dex-4) chewable tab 8 tablet  8 tablet Oral Q15 Min PRN    fluticasone-salmeterol (Advair Diskus) 250-50 MCG/ACT inhaler 1 puff  1 puff Inhalation BID              Results:     Recent Labs   Lab 10/09/24  0643 10/10/24  0345 10/11/24  0404 10/12/24  0332 10/13/24  0442   RBC 2.57* 3.17* 2.60* 2.71* 2.80*   HGB 7.9* 9.6* 7.9* 8.1* 8.5*   HCT 24.1* 29.3* 24.5* 26.1* 26.2*   MCV 93.8 92.4 94.2 96.3 93.6   NEPRELIM 17.90* 18.10* 16.87*  --   --    WBC 19.1* 20.0* 18.6* 20.5* 19.3*   .0 372.0 437.0 448.0 452.0*     Recent Labs   Lab 10/09/24  0643 10/10/24  0345 10/11/24  0404 10/12/24  0332 10/13/24  0442   * 189* 234* 190* 198*   BUN 87* 43* 86* 48* 80*   CREATSERUM 7.07* 4.59* 6.95* 4.23* 6.45*   CA 9.9 9.9 9.9 10.3 10.3    ALB 3.6 3.9 3.5  --   --    * 141 145 142 141   K 4.7 4.8 4.6 4.5 4.9   CL 95* 100 103 104 103   CO2 25.0 27.0 31.0 27.0 25.0   ALKPHO 132* 130* 115  --   --    AST 53* 43* 29  --   --    ALT <7* <7* <7*  --   --    BILT 0.3 0.3 0.3  --   --    TP 6.8 7.3 6.7  --   --      PTT   Date Value Ref Range Status   08/09/2024 30.1 23.0 - 36.0 seconds Final     INR   Date Value Ref Range Status   08/09/2024 1.45 (H) 0.80 - 1.20 Final     Comment:     Therapeutic INR range for patients on warfarin:  2.0-3.0 for most medical conditions and surgical prophylaxis   2.5-3.5 for mechanical heart valves and recurrent thromboembolism    Direct thrombin inhibitors (e.g. argatroban, bivalirudin), factor Xa inhibitors (e.g. apixaban, rivaroxaban), and conditions such as coagulation factor deficiency, vitamin K deficiency, and liver disease will prolong the prothrombin time/INR.    Unfractionated heparin and low molecular weight heparin do not affect the prothrombin time/INR up to a concentration of 1 IU/mL and 1.5 IU/mL, respectively.         No results for input(s): \"BNP\" in the last 168 hours.  Recent Labs   Lab 10/07/24  0409 10/08/24  0957 10/10/24  0345   PHOS 9.6* 8.6* 7.7*        Recent Labs   Lab 10/07/24  0409 10/08/24  0957 10/09/24  0643 10/10/24  0345 10/11/24  0404   PHOS 9.6* 8.6*  --  7.7*  --    ALB 3.6  --  3.6 3.9 3.5       XR CHEST AP PORTABLE  (CPT=71045)    Result Date: 10/12/2024  CONCLUSION: No acute cardiopulmonary abnormality.    Dictated by (CST): Oseas Good MD on 10/12/2024 at 7:42 AM     Finalized by (CST): Oseas Good MD on 10/12/2024 at 7:42 AM         EKG 12 Lead    Result Date: 10/13/2024  Sinus rhythm with short MT with Premature atrial complexes Abnormal QRS-T angle, consider primary T wave abnormality Abnormal ECG When compared with ECG of 28-SEP-2024 12:04, Premature ventricular complexes are no longer Present Premature atrial complexes are now Present Confirmed by MAVERICK ROSALES ([id number]) on  10/13/2024 8:51:02 AM    EKG 12 Lead    Result Date: 10/12/2024  Sinus tachycardia T wave abnormality, consider lateral ischemia Abnormal ECG When compared with ECG of 11-OCT-2024 18:17, Premature atrial complexes are no longer Present PA interval has increased Vent. rate has increased BY  41 BPM Borderline criteria for Inferior infarct are no longer Present ST no longer depressed in Inferior leads Non-specific change in ST segment in Lateral leads Confirmed by BLAS AUGUSTIN, ESTRELLA ([id number]) on 10/12/2024 5:01:34 AM     Assessment and Plan:       79 year old male with past medical history of T2DM, HTN, HLD, ESRD on HD MWF, CAD s/p PCI (5/2024), A.fib on Eliquis, HFpEF, KRAIG, BPH, history of recurrent gastrointestinal bleeding presented to the ER after a fall sustaining a left hip fracture    1.ESRD: HD Monday Wednesday Friday  HD tomorrow  hyperphosphatemia: Low phos diet. Improve phos - repeat in am   Sodium wnl    2.anemia: Decline in hemoglobin noted  Patient with history of recurrent GI bleed.  S/p EGD and colonoscopy in July 2024  Hemoglobin 10.5->9.6 ->8.5 - monitor   Plavix resumed  On IV Protonix twice daily  On Epogen-s/p IV iron    3.coronary artery disease status post PCI in May 2024/paroxysmal A-fib status post Watchman device  Cardiology input noted    4.acute respiratory failure: Presumably secondary to aspiration/pulmonary edema  S/p extubation 10/6.  Saturating well on RA     5.left hip fracture: S/p left total hip arthroplasty on 10/4    Discussed with nursing and family at bedside     Walker Sheppard MD  10/13/2024

## 2024-10-13 NOTE — PHYSICAL THERAPY NOTE
PHYSICAL THERAPY TREATMENT NOTE - INPATIENT     Room Number: 415/415-A       Presenting Problem: fall, s/p L WENDY  Co-Morbidities : Anemia    Asthma ([facility])   Back problem   BPH (benign prostatic hyperplasia)   Calculus of kidney   Cataract   Coronary atherosclerosis   Diabetes ([facility])   ESRD (end stage renal disease) on dialysis ([facility])   Essential hypertension   High blood pressure   High cholesterol   History of blood transfusion   Hyperlipidemia   Neuropathy    hands and feet   KRAIG on CPAP   Renal disorder   Sleep apnea   Visual impairment    glasses   Vocal cord paralysis, unilateral partial    Problem List  Principal Problem:    Closed displaced midcervical fracture of left femur with delayed healing  Active Problems:    ESRD (end stage renal disease) ([facility])    Closed fracture of left hip ([facility])    Hyperphosphatemia    Respiratory failure ([facility])    Anemia      PHYSICAL THERAPY ASSESSMENT   Patient demonstrates good  progress this session, goals  remain in progress.      Patient is requiring maximum assist and 2A  as a result of the following impairments: decreased functional strength, decreased endurance/aerobic capacity, pain, impaired standing and sitting  balance, decreased muscular endurance, medical status, and limited observed throughout B LE  ROM.  Pt with left hip anterior precautions .   Pt observed with trunk lean right , pt reports this is due to decrease tolerance for sitting on left hip post sx.   Requested pink egg crate cushion and pillows used for improved resting sitting posture in chair.      Patient continues to function below baseline with bed mobility, transfers, gait, stair negotiation, maintaining seated position, standing prolonged periods, and performing household tasks.  Next session anticipate patient to progress bed mobility, transfers, gait, and standing prolonged periods.  Physical Therapy will continue to follow patient for duration of hospitalization.    Patient continues to benefit from  continued skilled PT services: to promote return to prior level of function and safety with continuous assistance and gradual rehabilitative therapy .    PLAN DURING HOSPITALIZATION  Nursing Mobility Recommendation :  (2A with gait belt and RW for SPT)     PT Treatment Plan: Bed mobility;Body mechanics;Coordination;Endurance;Energy conservation;Patient education;Family education;Gait training;Balance training;Transfer training;Stair training;Stoop training  Frequency (Obs): Daily     SUBJECTIVE  Agreeable to mobility.  \" My feet are both numb \"      OBJECTIVE  Precautions: Limb alert - left;WENDY - anterior    WEIGHT BEARING RESTRICTION  L Lower Extremity: Weight Bearing as Tolerated      PAIN ASSESSMENT   Rating: 3  Location: left LE  Management Techniques: Activity promotion;Body mechanics;Breathing techniques;Relaxation;Repositioning    BALANCE  Static Sitting: Fair +  Dynamic Sitting: Fair  Static Standing: Fair -  Dynamic Standing: Poor +    ACTIVITY TOLERANCE  Pulse: 79        BP: 131/55 (resting  after activity  132/66)              O2 WALK  Oxygen Therapy  SPO2% on Room Air at Rest: 98  SPO2% Ambulation on Room Air: 97    AM-PAC '6-Clicks' INPATIENT SHORT FORM - BASIC MOBILITY  How much difficulty does the patient currently have...  Patient Difficulty: Turning over in bed (including adjusting bedclothes, sheets and blankets)?: A Lot   Patient Difficulty: Sitting down on and standing up from a chair with arms (e.g., wheelchair, bedside commode, etc.): A Lot   Patient Difficulty: Moving from lying on back to sitting on the side of the bed?: A Lot   How much help from another person does the patient currently need...   Help from Another: Moving to and from a bed to a chair (including a wheelchair)?: A Lot   Help from Another: Need to walk in hospital room?: A Lot   Help from Another: Climbing 3-5 steps with a railing?: Total     AM-PAC Score:  Raw Score: 11   Approx Degree of Impairment: 72.57%   Standardized  Score (AM-PAC Scale): 33.86   CMS Modifier (G-Code): CL    FUNCTIONAL ABILITY STATUS--pt with weakness and decrease AROM observed during fxn mobility in trunk and B LE , pt is s/p left anterior WENDY .  Anterior precautions were maintained.   Functional Mobility/Gait Assessment  Gait Assistance: Maximum assistance;Dependent assistance (2A used for ambulation for pt safety .  Min assist of one for ambulation with second person providing close chair follow)  Distance (ft): 15 ft x 1  Assistive Device: Rolling walker  Pattern: L Decreased stance time;R Steppage;L Steppage;R Foot flat;L Foot flat  Sit to Stand:  initally using 2A for sit to stand at mod level .  Repeated sit to stand x 3 -4 time for fxn transfer training progressing to max assist of one by end of session.     Skilled Therapy Provided: Patient received seated in bedside chair, agreeable to physical therapy. Vital signs monitored as noted above, no adverse symptoms and patient stable during session. Pt participated in fxn transfer training and mobility as above , pt education see flow sheet , and therapy exercises see flow sheet .   Therapy tech present.       The patient's Approx Degree of Impairment: 72.57% has been calculated based on documentation in the [facility] '6 clicks' Inpatient Daily Activity Short Form.  Research supports that patients with this level of impairment may benefit from JESUS .  Therapy is recommending JESUS as next level of care/.  Final disposition will be made by interdisciplinary medical team.      Patient End of Session: Up in chair;Needs met;Call light within reach;RN aware of session/findings;All patient questions and concerns addressed;Hospital anti-slip socks;Family present    CURRENT GOALS   Goals to be met by: 10/22/24  Patient Goal Patient's self-stated goal is: to return to PLOF   Goal #1 Patient is able to demonstrate supine - sit EOB @ level: moderate assistance      Goal #1   Current Status NT   Goal #2 Patient is able to  demonstrate transfers EOB to/from Chair/Wheelchair at assistance level: moderate assistance with  least restrictive device      Goal #2  Current Status  see above in progress    Goal #3 Patient is able to ambulate >10 feet with assist device:  least restrictive device  at assistance level: moderate assistance   Goal #3   Current Status  as above in progress    Goal #4     Goal #4   Current Status     Goal #5 Patient to demonstrate independence with home activity/exercise instructions provided to patient in preparation for discharge.   Goal #5   Current Status  Progressing   Goal #6     Goal #6  Current Status     Therapy activity 2 units

## 2024-10-13 NOTE — PROGRESS NOTES
[facility]  part of [facility]    Progress Note    Ollie Hernández Patient Status:  Inpatient    1945 MRN [id number]   Location [facility] 2W/SW Attending Pranay Michel MD   Hosp Day # 15 PCP Wili Parmar MD     Chief Complaint:   Chief Complaint   Patient presents with    Fall       Subjective:   Ollie Parmarlious   Status post left hip arthroplasty on 10/4/2024.      Patient successfully extubated 10/6/2024.    Now on room air  Working well with PT.       Objective:   Objective:    Blood pressure 131/55, pulse 79, temperature 97.8 °F (36.6 °C), temperature source Oral, resp. rate 16, height 5' 4.02\" (1.626 m), weight 144 lb 10 oz (65.6 kg), SpO2 100%.    Physical Exam:    General: Alert comfortable in no acute distress on 2 L nasal cannula  HEENT: Normocephalic, anicteric sclera   Neck: supple  Cardiac: Regular rate and rhythm. S1, S2 normal. No murmurs thrills or rubs  Lungs: Coarse breath sounds bilat  Abdomen: Soft, non-distended  Extremities: Without clubbing, cyanosis or edema.   Skin: Warm and dry.       Results:   Results:    Labs:  Recent Labs   Lab 10/09/24  0643 10/10/24  0345 10/11/24  0404 10/12/24  0332 10/13/24  044   WBC 19.1* 20.0* 18.6* 20.5* 19.3*   HGB 7.9* 9.6* 7.9* 8.1* 8.5*   MCV 93.8 92.4 94.2 96.3 93.6   .0 372.0 437.0 448.0 452.0*       Recent Labs   Lab 10/09/24  0643 10/10/24  0345 10/11/24  0404 10/12/24  0332 10/13/24  0442   * 189* 234* 190* 198*   BUN 87* 43* 86* 48* 80*   CREATSERUM 7.07* 4.59* 6.95* 4.23* 6.45*   CA 9.9 9.9 9.9 10.3 10.3   ALB 3.6 3.9 3.5  --   --    * 141 145 142 141   K 4.7 4.8 4.6 4.5 4.9   CL 95* 100 103 104 103   CO2 25.0 27.0 31.0 27.0 25.0   ALKPHO 132* 130* 115  --   --    AST 53* 43* 29  --   --    ALT <7* <7* <7*  --   --    BILT 0.3 0.3 0.3  --   --    TP 6.8 7.3 6.7  --   --        Estimated Creatinine Clearance: 8 mL/min (A) (based on SCr of 6.45 mg/dL (H)).    No results for input(s):  \"PTP\", \"INR\" in the last 168 hours.         Culture:  Hospital Encounter on 09/28/24   1. Blood Culture     Status: None    Collection Time: 09/30/24 10:03 AM    Specimen: Bld,Arterial Line; Blood   Result Value Ref Range    Blood Culture Result No Growth 5 Days N/A       Cardiac  No results for input(s): \"TROP\", \"PBNP\" in the last 168 hours.      Imaging: Imaging data reviewed in Epic.  XR CHEST AP PORTABLE  (CPT=71045)    Result Date: 10/12/2024  CONCLUSION: No acute cardiopulmonary abnormality.    Dictated by (CST): Oseas Good MD on 10/12/2024 at 7:42 AM     Finalized by (CST): Oseas Good MD on 10/12/2024 at 7:42 AM           Medications:    ipratropium-albuterol  3 mL Nebulization 2 times daily    insulin degludec  10 Units Subcutaneous Daily    insulin aspart  3 Units Subcutaneous TID CC    clopidogrel  75 mg Oral Daily    carvedilol  25 mg Oral BID    sevelamer carbonate  800 mg Oral TID CC    insulin aspart  1-7 Units Subcutaneous TID CC and HS    atorvastatin  40 mg Oral Nightly    epoetin donna  5,000 Units Subcutaneous Once per day on Monday Wednesday Friday    heparin  5,000 Units Subcutaneous Q8H STEPHANIE    senna  10 mL Oral BID    docusate  100 mg Oral BID    mineral oil-white petrolatum  1 Application Both Eyes Nightly    lidocaine-menthol  2 patch Transdermal Daily    pantoprazole  40 mg Intravenous Q12H    cetirizine  5 mg Per NG Tube Daily    fluticasone-salmeterol  1 puff Inhalation BID         Assessment and Plan:   Assessment & Plan:      Acute hypoxic respiratory failure   Hypotension   Aspiration PNA  Pulmonary and cardiology on consult.   Secondary to pulmonary edema and aspiration  Intubated 9/29- extubated on 10/6/2024  Empiric zosyn.  Completed 5 days  Sputum cx normal julio c. Blood cx x 4 NGTD  Volume management per nephrology   CXR pulmonary edema. Improved alveolar opacities.   10/6: extubated  10/8: now on 2L O2NC, repeat CXR, SLP, PT , OT  10/9- now struggling to breathe. Will be  transferred to the ICU. Needs emergent dialysis. Discussed with Pulm and nursing   10/10- feeling well monitor in the icu one more day    10/11- can be transferred to the floor. Tolerating diet. On room air.   10/12- will hopefully be discharged to rehab Monday   10/13- discharge to rehab. Tyelnol for pain control.         ESRD   HD per nephrology MWF  Nephro on consult.   10/7: s/p HD   10/11- dialysis tomorrow   10/12- dialysis today 3 liters to be removed today   On epogen and IV protonix         Hip fracture   S/p fall PTA -status post left hip arthroplasty on 10/4/2024  Orthopedic surgery on consult.   High risk for surgery  however surgery is necessary.   Pain control     Leukocytosis                - Most likely reactive or from steroids                - no signs or symptoms of infection- monitor CBC daily      DM II   A1c   ISS     Paroxysmal A.fib - currently NSR   Acute on chronic HFpEF  CAD s/p jodi circ 5/24'   Elevate troponin secondary to demand ischemia   S/p watchman device 9/11/24  Cont coreg, statin  No further cardiac testing prior to planned surgery.   Resume ASA/Plavix once safely able to do so.     Coffee ground emesis   Resolved.   IV protonix BID   Hb stable. Resume plavix. Resume aspirin if Hb remains stable.       >55min spent, >50% spent counseling and coordinating care in the form of educating pt/family and d/w consultants and staff. Most of the time spent discussing the above plan.        Plan of care discussed with patient or family at bedside.    Ne Irizarry MD          Supplementary Documentation:     Quality:  DVT Prophylaxis: heparin   CODE status: Full  Dispo: per clinical course            Estimated date of discharge: TBD  Discharge is dependent on: clinical stability  At this point Mr. Hernández is expected to be discharge to: TBD        Ne Irizarry MD

## 2024-10-13 NOTE — PLAN OF CARE
Patient is alert and oriented X4. Up with x1-2 assist Stand and pivot with RW. CMS intact to left hip, dressing is CDI. Pain minimal to hip. Plavix and hep for dvt prophylaxis. Glucose check ac and hs. Tele in place, has watchman device. CPAP HS.  Mechanical soft diet, honey think liquids, crush meds. Saline locked. Call light is within reach. Plan for discharge to rehab possibly tomorrow after HD.       Problem: Patient Centered Care  Goal: Patient preferences are identified and integrated in the patient's plan of care  Description: Interventions:  - What would you like us to know as we care for you?   - Provide timely, complete, and accurate information to patient/family  - Incorporate patient and family knowledge, values, beliefs, and cultural backgrounds into the planning and delivery of care  - Encourage patient/family to participate in care and decision-making at the level they choose  - Honor patient and family perspectives and choices  Outcome: Progressing     Problem: Diabetes/Glucose Control  Goal: Glucose maintained within prescribed range  Description: INTERVENTIONS:  - Monitor Blood Glucose as ordered  - Assess for signs and symptoms of hyperglycemia and hypoglycemia  - Administer ordered medications to maintain glucose within target range  - Assess barriers to adequate nutritional intake and initiate nutrition consult as needed  - Instruct patient on self management of diabetes  Outcome: Progressing     Problem: Patient/Family Goals  Goal: Patient/Family Long Term Goal  Description: Patient's Long Term Goal:     Interventions:  -   - See additional Care Plan goals for specific interventions  Outcome: Progressing  Goal: Patient/Family Short Term Goal  Description: Patient's Short Term Goal:     Interventions:   -   - See additional Care Plan goals for specific interventions  Outcome: Progressing     Problem: PAIN - ADULT  Goal: Verbalizes/displays adequate comfort level or patient's stated pain  goal  Description: INTERVENTIONS:  - Encourage pt to monitor pain and request assistance  - Assess pain using appropriate pain scale  - Administer analgesics based on type and severity of pain and evaluate response  - Implement non-pharmacological measures as appropriate and evaluate response  - Consider cultural and social influences on pain and pain management  - Manage/alleviate anxiety  - Utilize distraction and/or relaxation techniques  - Monitor for opioid side effects  - Notify MD/LIP if interventions unsuccessful or patient reports new pain  - Anticipate increased pain with activity and pre-medicate as appropriate  Outcome: Progressing     Problem: SAFETY ADULT - FALL  Goal: Free from fall injury  Description: INTERVENTIONS:  - Assess pt frequently for physical needs  - Identify cognitive and physical deficits and behaviors that affect risk of falls.  - [place] fall precautions as indicated by assessment.  - Educate pt/family on patient safety including physical limitations  - Instruct pt to call for assistance with activity based on assessment  - Modify environment to reduce risk of injury  - Provide assistive devices as appropriate  - Consider OT/PT consult to assist with strengthening/mobility  - Encourage toileting schedule  Outcome: Progressing     Problem: DISCHARGE PLANNING  Goal: Discharge to home or other facility with appropriate resources  Description: INTERVENTIONS:  - Identify barriers to discharge w/pt and caregiver  - Include patient/family/discharge partner in discharge planning  - Arrange for needed discharge resources and transportation as appropriate  - Identify discharge learning needs (meds, wound care, etc)  - Arrange for interpreters to assist at discharge as needed  - Consider post-discharge preferences of patient/family/discharge partner  - Complete POLST form as appropriate  - Assess patient's ability to be responsible for managing their own health  - Refer to Case Management  Department for coordinating discharge planning if the patient needs post-hospital services based on physician/LIP order or complex needs related to functional status, cognitive ability or social support system  Outcome: Progressing     Problem: CARDIOVASCULAR - ADULT  Goal: Maintains optimal cardiac output and hemodynamic stability  Description: INTERVENTIONS:  - Monitor vital signs, rhythm, and trends  - Monitor for bleeding, hypotension and signs of decreased cardiac output  - Evaluate effectiveness of vasoactive medications to optimize hemodynamic stability  - Monitor arterial and/or venous puncture sites for bleeding and/or hematoma  - Assess quality of pulses, skin color and temperature  - Assess for signs of decreased coronary artery perfusion - ex. Angina  - Evaluate fluid balance, assess for edema, trend weights  Outcome: Progressing  Goal: Absence of cardiac arrhythmias or at baseline  Description: INTERVENTIONS:  - Continuous cardiac monitoring, monitor vital signs, obtain 12 lead EKG if indicated  - Evaluate effectiveness of antiarrhythmic and heart rate control medications as ordered  - Initiate emergency measures for life threatening arrhythmias  - Monitor electrolytes and administer replacement therapy as ordered  Outcome: Progressing     Problem: RESPIRATORY - ADULT  Goal: Achieves optimal ventilation and oxygenation  Description: INTERVENTIONS:  - Assess for changes in respiratory status  - Assess for changes in mentation and behavior  - Position to facilitate oxygenation and minimize respiratory effort  - Oxygen supplementation based on oxygen saturation or ABGs  - Provide Smoking Cessation handout, if applicable  - Encourage broncho-pulmonary hygiene including cough, deep breathe, Incentive Spirometry  - Assess the need for suctioning and perform as needed  - Assess and instruct to report SOB or any respiratory difficulty  - Respiratory Therapy support as indicated  - Manage/alleviate anxiety  -  Monitor for signs/symptoms of CO2 retention  Outcome: Progressing     Problem: GASTROINTESTINAL - ADULT  Goal: Maintains or returns to baseline bowel function  Description: INTERVENTIONS:  - Assess bowel function  - Maintain adequate hydration with IV or PO as ordered and tolerated  - Evaluate effectiveness of GI medications  - Encourage mobilization and activity  - Obtain nutritional consult as needed  - Establish a toileting routine/schedule  - Consider collaborating with pharmacy to review patient's medication profile  Outcome: Progressing

## 2024-10-13 NOTE — PLAN OF CARE
Patient Alert and Oriented x4. Cpap @ night. ACHS. HemoDialysis MWF. Has watchman device, on tele. Fall precautions in place. Call light and personal belongings within arms reach.  Problem: Patient Centered Care  Goal: Patient preferences are identified and integrated in the patient's plan of care  Description: Interventions:  - What would you like us to know as we care for you?   - Provide timely, complete, and accurate information to patient/family  - Incorporate patient and family knowledge, values, beliefs, and cultural backgrounds into the planning and delivery of care  - Encourage patient/family to participate in care and decision-making at the level they choose  - Honor patient and family perspectives and choices  Outcome: Progressing     Problem: Diabetes/Glucose Control  Goal: Glucose maintained within prescribed range  Description: INTERVENTIONS:  - Monitor Blood Glucose as ordered  - Assess for signs and symptoms of hyperglycemia and hypoglycemia  - Administer ordered medications to maintain glucose within target range  - Assess barriers to adequate nutritional intake and initiate nutrition consult as needed  - Instruct patient on self management of diabetes  Outcome: Progressing     Problem: PAIN - ADULT  Goal: Verbalizes/displays adequate comfort level or patient's stated pain goal  Description: INTERVENTIONS:  - Encourage pt to monitor pain and request assistance  - Assess pain using appropriate pain scale  - Administer analgesics based on type and severity of pain and evaluate response  - Implement non-pharmacological measures as appropriate and evaluate response  - Consider cultural and social influences on pain and pain management  - Manage/alleviate anxiety  - Utilize distraction and/or relaxation techniques  - Monitor for opioid side effects  - Notify MD/LIP if interventions unsuccessful or patient reports new pain  - Anticipate increased pain with activity and pre-medicate as  appropriate  Outcome: Progressing

## 2024-10-14 LAB
ANION GAP SERPL CALC-SCNC: 13 MMOL/L (ref 0–18)
ATRIAL RATE: 70 BPM
BUN BLD-MCNC: 112 MG/DL (ref 9–23)
BUN/CREAT SERPL: 13.7 (ref 10–20)
CALCIUM BLD-MCNC: 10.1 MG/DL (ref 8.7–10.4)
CHLORIDE SERPL-SCNC: 103 MMOL/L (ref 98–112)
CO2 SERPL-SCNC: 24 MMOL/L (ref 21–32)
CREAT BLD-MCNC: 8.16 MG/DL
DEPRECATED RDW RBC AUTO: 56.1 FL (ref 35.1–46.3)
EGFRCR SERPLBLD CKD-EPI 2021: 6 ML/MIN/1.73M2 (ref 60–?)
ERYTHROCYTE [DISTWIDTH] IN BLOOD BY AUTOMATED COUNT: 15.9 % (ref 11–15)
GLUCOSE BLD-MCNC: 183 MG/DL (ref 70–99)
GLUCOSE BLDC GLUCOMTR-MCNC: 138 MG/DL (ref 70–99)
GLUCOSE BLDC GLUCOMTR-MCNC: 164 MG/DL (ref 70–99)
GLUCOSE BLDC GLUCOMTR-MCNC: 212 MG/DL (ref 70–99)
GLUCOSE BLDC GLUCOMTR-MCNC: 91 MG/DL (ref 70–99)
HCT VFR BLD AUTO: 27.1 %
HGB BLD-MCNC: 8.5 G/DL
MAGNESIUM SERPL-MCNC: 2.5 MG/DL (ref 1.6–2.6)
MCH RBC QN AUTO: 30.1 PG (ref 26–34)
MCHC RBC AUTO-ENTMCNC: 31.4 G/DL (ref 31–37)
MCV RBC AUTO: 96.1 FL
OSMOLALITY SERPL CALC.SUM OF ELEC: 330 MOSM/KG (ref 275–295)
P AXIS: 50 DEGREES
P-R INTERVAL: 144 MS
PLATELET # BLD AUTO: 428 10(3)UL (ref 150–450)
POTASSIUM SERPL-SCNC: 4.7 MMOL/L (ref 3.5–5.1)
Q-T INTERVAL: 412 MS
QRS DURATION: 74 MS
QTC CALCULATION (BEZET): 444 MS
R AXIS: 25 DEGREES
RBC # BLD AUTO: 2.82 X10(6)UL
SODIUM SERPL-SCNC: 140 MMOL/L (ref 136–145)
T AXIS: 47 DEGREES
TROPONIN I SERPL HS-MCNC: 25 NG/L
VENTRICULAR RATE: 70 BPM
WBC # BLD AUTO: 19.1 X10(3) UL (ref 4–11)

## 2024-10-14 PROCEDURE — 99233 SBSQ HOSP IP/OBS HIGH 50: CPT | Performed by: HOSPITALIST

## 2024-10-14 PROCEDURE — 99232 SBSQ HOSP IP/OBS MODERATE 35: CPT | Performed by: INTERNAL MEDICINE

## 2024-10-14 NOTE — SLP NOTE
SPEECH DAILY NOTE - INPATIENT    ASSESSMENT & PLAN   ASSESSMENT  PPE REQUIRED. THIS THERAPIST WORE GLOVES, DROPLET MASK, AND GOGGLES FOR DURATION OF EVALUATION. HANDS WASHED UPON ENTRANCE/EXIT.    SLP f/u for ongoing dysphagia tx/meal assessment per recommendations of bite sized/moderately thick liquids per downgrade. RN reports pt tolerates diet and medication well with no overt clinical s/s aspiration. Pt expressed he has difficulty swallowing when he is not fully upright.     Pt positioned upright in bed with family at bedside feeding pt breakfast. Pt afebrile, tolerating room air with oxygen status 97 prior to the start of oral trials. SLP reviewed aspiration precautions and safe swallowing compensatory strategies with the patient. Safe swallow guidelines remain written on the white board in purple. Diet recommendations remain on the whiteboard in the room. Patient and family v/u. Provided 1:1 feed assistance, pt tolerates puree with no overt clinical signs/symptoms of aspiration. Pt with persistent throat clearing following moderately thick liquids via spoon, however, pt reports sore throat. Discussed recommendation for VFSS due to dysphagia hx and modified diet. Oxygen status remained >94 t/o the entire session.     PLAN: SLP to complete VFSS if pt available (possible discharge 10/14/24) to rule out aspiration.     Diet Recommendations - Solids: Mechanical soft chopped/ Soft & Bite Sized  Diet Recommendations - Liquids: Honey thick liquids/ Moderately thick (by teaspoon only)    Compensatory Strategies Recommended: Liquids via spoon;No straws;Slow rate;Small bites and sips  Aspiration Precautions: Upright position;Slow rate;Small bites and sips;No straw  Medication Administration Recommendations: Crushed in puree (and/or via IV)    Patient Experiencing Pain: No      Treatment Plan  Treatment Plan/Recommendations: Videofluoroscopic swallow study    Interdisciplinary Communication: Discussed with  RN  Recommendations posted at bedside            GOALS  Goal #1 The patient will tolerate bite sized consistency and mildly thick liquids without overt signs or symptoms of aspiration with 100 % accuracy over 1-2 session(s).  DOWNGRADE 10/12/24; see new goal below.    The patient will tolerate SOFT AND BITE SIZED consistency and MODERATELY THICK liquids VIA TSP without overt signs or symptoms of aspiration with 100 % accuracy over 1-2 session(s).    Goal modified 10/12           In Progress     Goal #2 The patient/family/caregiver will demonstrate understanding and implementation of aspiration precautions and swallow strategies independently over 1-2 session(s).  In Progress   Goal #3 The patient will tolerate trial upgrade of soft/hard solid consistency and thin liquids without overt signs or symptoms of aspiration with 100 % accuracy over 1-2 session(s).  REVISED 10/12 following downgrade; see below.    The patient will tolerate trial upgrade of soft/hard solid consistency and mildly thick/thin liquids without overt signs or symptoms of aspiration with 100 % accuracy over 1-2 session(s).    Goal modified 10/12           In Progress   Goal #4 The patient will utilize compensatory strategies as outlined by  BSSE (clinical evaluation) including Slow rate, Small bites, Small sips, No straws, Upright 90 degrees, Eliminate distractions with PRN assistance 100 % of the time across 2 sessions.  In Progress   Goal #5 VFSS to be completed if pt available (possible discharge this PM) to rule out aspiration. In progress     FOLLOW UP  Follow Up Needed (Documentation Required): Yes  SLP Follow-up Date: 10/15/24  Number of Visits to Meet Established Goals: 1    Session: 4    If you have any questions, please contact ARTEMIO Acuna M.S. CCC-SLP  Speech Language Pathologist  Phone Number [phone]

## 2024-10-14 NOTE — PLAN OF CARE
Problem: Patient Centered Care  Goal: Patient preferences are identified and integrated in the patient's plan of care  Description: Interventions:  - What would you like us to know as we care for you? Pt from home with family  - Provide timely, complete, and accurate information to patient/family  - Incorporate patient and family knowledge, values, beliefs, and cultural backgrounds into the planning and delivery of care  - Encourage patient/family to participate in care and decision-making at the level they choose  - Honor patient and family perspectives and choices  Outcome: Progressing     Problem: Diabetes/Glucose Control  Goal: Glucose maintained within prescribed range  Description: INTERVENTIONS:  - Monitor Blood Glucose as ordered  - Assess for signs and symptoms of hyperglycemia and hypoglycemia  - Administer ordered medications to maintain glucose within target range  - Assess barriers to adequate nutritional intake and initiate nutrition consult as needed  - Instruct patient on self management of diabetes  Outcome: Progressing     Problem: Patient/Family Goals  Goal: Patient/Family Long Term Goal  Description: Patient's Long Term Goal: To improve mobility    Interventions:  - See additional Care Plan goals for specific interventions  Outcome: Progressing  Goal: Patient/Family Short Term Goal  Description: Patient's Short Term Goal: To go to rehab    Interventions:  - See additional Care Plan goals for specific interventions  Outcome: Progressing     Problem: PAIN - ADULT  Goal: Verbalizes/displays adequate comfort level or patient's stated pain goal  Description: INTERVENTIONS:  - Encourage pt to monitor pain and request assistance  - Assess pain using appropriate pain scale  - Administer analgesics based on type and severity of pain and evaluate response  - Implement non-pharmacological measures as appropriate and evaluate response  - Consider cultural and social influences on pain and pain  management  - Manage/alleviate anxiety  - Utilize distraction and/or relaxation techniques  - Monitor for opioid side effects  - Notify MD/LIP if interventions unsuccessful or patient reports new pain  - Anticipate increased pain with activity and pre-medicate as appropriate  Outcome: Progressing     Problem: SAFETY ADULT - FALL  Goal: Free from fall injury  Description: INTERVENTIONS:  - Assess pt frequently for physical needs  - Identify cognitive and physical deficits and behaviors that affect risk of falls.  - [place] fall precautions as indicated by assessment.  - Educate pt/family on patient safety including physical limitations  - Instruct pt to call for assistance with activity based on assessment  - Modify environment to reduce risk of injury  - Provide assistive devices as appropriate  - Consider OT/PT consult to assist with strengthening/mobility  - Encourage toileting schedule  Outcome: Progressing     Problem: DISCHARGE PLANNING  Goal: Discharge to home or other facility with appropriate resources  Description: INTERVENTIONS:  - Identify barriers to discharge w/pt and caregiver  - Include patient/family/discharge partner in discharge planning  - Arrange for needed discharge resources and transportation as appropriate  - Identify discharge learning needs (meds, wound care, etc)  - Arrange for interpreters to assist at discharge as needed  - Consider post-discharge preferences of patient/family/discharge partner  - Complete POLST form as appropriate  - Assess patient's ability to be responsible for managing their own health  - Refer to Case Management Department for coordinating discharge planning if the patient needs post-hospital services based on physician/LIP order or complex needs related to functional status, cognitive ability or social support system  Outcome: Progressing     Problem: CARDIOVASCULAR - ADULT  Goal: Maintains optimal cardiac output and hemodynamic stability  Description:  INTERVENTIONS:  - Monitor vital signs, rhythm, and trends  - Monitor for bleeding, hypotension and signs of decreased cardiac output  - Evaluate effectiveness of vasoactive medications to optimize hemodynamic stability  - Monitor arterial and/or venous puncture sites for bleeding and/or hematoma  - Assess quality of pulses, skin color and temperature  - Assess for signs of decreased coronary artery perfusion - ex. Angina  - Evaluate fluid balance, assess for edema, trend weights  Outcome: Progressing  Goal: Absence of cardiac arrhythmias or at baseline  Description: INTERVENTIONS:  - Continuous cardiac monitoring, monitor vital signs, obtain 12 lead EKG if indicated  - Evaluate effectiveness of antiarrhythmic and heart rate control medications as ordered  - Initiate emergency measures for life threatening arrhythmias  - Monitor electrolytes and administer replacement therapy as ordered  Outcome: Progressing     Problem: RESPIRATORY - ADULT  Goal: Achieves optimal ventilation and oxygenation  Description: INTERVENTIONS:  - Assess for changes in respiratory status  - Assess for changes in mentation and behavior  - Position to facilitate oxygenation and minimize respiratory effort  - Oxygen supplementation based on oxygen saturation or ABGs  - Provide Smoking Cessation handout, if applicable  - Encourage broncho-pulmonary hygiene including cough, deep breathe, Incentive Spirometry  - Assess the need for suctioning and perform as needed  - Assess and instruct to report SOB or any respiratory difficulty  - Respiratory Therapy support as indicated  - Manage/alleviate anxiety  - Monitor for signs/symptoms of CO2 retention  Outcome: Progressing     Problem: GASTROINTESTINAL - ADULT  Goal: Maintains or returns to baseline bowel function  Description: INTERVENTIONS:  - Assess bowel function  - Maintain adequate hydration with IV or PO as ordered and tolerated  - Evaluate effectiveness of GI medications  - Encourage  mobilization and activity  - Obtain nutritional consult as needed  - Establish a toileting routine/schedule  - Consider collaborating with pharmacy to review patient's medication profile  Outcome: Progressing

## 2024-10-14 NOTE — PROGRESS NOTES
[facility]  part of [facility]    Progress Note    Ollie Hernández Patient Status:  Inpatient    1945 MRN [id number]   Location [facility] 4W/SW/SE Attending Ne Irizrary MD   Hosp Day # 16 PCP Wili Parmar MD       Subjective:   Ollie Hernández is a(n) 79 year old male     ROS:     Constitutional: Feels okay  ENMT:  Negative for ear drainage, hearing loss and nasal drainage  Eyes:  Negative for eye discharge and vision loss  Cardiovascular:  Negative for chest pain, sob, irregular heartbeat/palpitations  Respiratory:  Negative for cough, dyspnea and wheezing  Gastrointestinal:  Negative for abdominal pain, constipation, decreased appetite, diarrhea and vomiting  Genitourinary:  Negative for dysuria and hematuria  Endocrine:  Negative for abnormal sleep patterns, increased activity, polydipsia and polyphagia  Hema/Lymph:  Negative for easy bleeding and easy bruising  Integumentary: Discomfort on skin from breakdown around buttocks  Musculoskeletal:  Negative for bone/joint symptoms  Neurological:  Negative for gait disturbance  Psychiatric:  Negative for inappropriate interaction and psychiatric symptoms      Vitals:    10/14/24 1530   BP: 100/56   Pulse: (!) 123   Resp:    Temp:            PHYSICAL EXAM:   Constitutional: appears well hydrated alert and responsive no acute distress noted looking better  Head/Face: normocephalic  Eyes/Vision: normal extraocular motion is intact  Nose/Mouth/Throat:mucous membranes are moist and no oral lesions are noted  Neck/Thyroid: neck is supple without adenopathy  Lymphatic: no abnormal cervical, supraclavicular adenopathy is noted  Respiratory:  lungs are clear to auscultation bilaterally, normal respiratory effort  Cardiovascular: regular rate and rhythm no murmurs, gallups, or rubs  Abdomen: soft, non-tender, non-distended, BS normal  Vascular: well perfused femoral, and pedal pulses normal  Skin/Hair: no unusual rashes present, no  abnormal bruising noted  Back/Spine: no abnormalities noted  Musculoskeletal: full ROM all extremities good strength  no deformities  Extremities: no edema, cyanosis  Neurological:  Grossly normal    Results:     Laboratory Data:  Lab Results   Component Value Date    WBC 19.1 (H) 10/14/2024    HGB 8.5 (L) 10/14/2024    HCT 27.1 (L) 10/14/2024    .0 10/14/2024    CREATSERUM 8.16 (H) 10/14/2024     (H) 10/14/2024     10/14/2024    K 4.7 10/14/2024     10/14/2024    CO2 24.0 10/14/2024     (H) 10/14/2024    CA 10.1 10/14/2024    ALB 3.5 10/11/2024    ALKPHO 115 10/11/2024    BILT 0.3 10/11/2024    TP 6.7 10/11/2024    AST 29 10/11/2024    ALT <7 (L) 10/11/2024    PTT 30.1 08/09/2024    INR 1.45 (H) 08/09/2024    T4F 0.9 08/31/2022    TSH 2.844 07/04/2024     (H) 07/30/2024    PSA 2.94 10/20/2021    DDIMER 6.07 (H) 09/29/2024    ESRML 10 06/16/2024    CRP 1.30 (H) 06/16/2024    MG 2.5 10/14/2024    PHOS 7.7 (H) 10/10/2024    TROP <0.045 07/25/2019     08/05/2023    B12 672 07/04/2024       Imaging:  [unfilled]   No results found.      ASSESSMENT/PLAN:   Assessment       #1 hip fracture repaired    #2 ESRD had dialysis today labs okay    #3 decubiti seeing wound care  #4 history of GI bleeding  Hemoglobin 8.5 on Epogen   Discussed care with family may need rehab      10/14/2024  José Callahan MD

## 2024-10-14 NOTE — CM/SW NOTE
NOEMY followed up on DC planning.     NOEMY spoke with pt's family regarding SNF choice    Choice is [facility] in [place] - with HD    [facility] submitted - auth received as well    [id number] case ID    Will need HD approval    PLAN: DC to [facility] in [facility]    Aleja GREGG LSW, MSW ext. [phone]

## 2024-10-14 NOTE — PLAN OF CARE
Tele-tech reported pt had 12 beats of V-tach at 0125, pt appears to be asleep, denies any chest pain or discomfort. No further episode reported from tele-tech. Message sent to Dr Dunham        [id number] - orders received EKG, Trop & MG, will notify of any abnormal findings.

## 2024-10-14 NOTE — PLAN OF CARE
A&Ox4. Pt ambulates with 2 assist and walker, monitoring blood glucose levels per order, patient is voiding, tolerating honey thick liquid diet. Pt has had a BM today. Incision dressing changed today. Frequent rounding by nursing staff. Safety precautions maintained/call light within reach. Plan:possible discharge to rehab tomorrow.    Problem: Patient Centered Care  Goal: Patient preferences are identified and integrated in the patient's plan of care  Description: Interventions:  - What would you like us to know as we care for you?   - Provide timely, complete, and accurate information to patient/family  - Incorporate patient and family knowledge, values, beliefs, and cultural backgrounds into the planning and delivery of care  - Encourage patient/family to participate in care and decision-making at the level they choose  - Honor patient and family perspectives and choices  Outcome: Progressing     Problem: Diabetes/Glucose Control  Goal: Glucose maintained within prescribed range  Description: INTERVENTIONS:  - Monitor Blood Glucose as ordered  - Assess for signs and symptoms of hyperglycemia and hypoglycemia  - Administer ordered medications to maintain glucose within target range  - Assess barriers to adequate nutritional intake and initiate nutrition consult as needed  - Instruct patient on self management of diabetes  Outcome: Progressing     Problem: Patient/Family Goals  Goal: Patient/Family Long Term Goal  Description: Patient's Long Term Goal: Maintain pain control    Interventions:  -POC, Medication administration.  - See additional Care Plan goals for specific interventions  Outcome: Progressing  Goal: Patient/Family Short Term Goal  Description: Patient's Short Term Goal: Discharge to rehab    Interventions:     - See additional Care Plan goals for specific interventions  Outcome: Progressing     Problem: PAIN - ADULT  Goal: Verbalizes/displays adequate comfort level or patient's stated pain  goal  Description: INTERVENTIONS:  - Encourage pt to monitor pain and request assistance  - Assess pain using appropriate pain scale  - Administer analgesics based on type and severity of pain and evaluate response  - Implement non-pharmacological measures as appropriate and evaluate response  - Consider cultural and social influences on pain and pain management  - Manage/alleviate anxiety  - Utilize distraction and/or relaxation techniques  - Monitor for opioid side effects  - Notify MD/LIP if interventions unsuccessful or patient reports new pain  - Anticipate increased pain with activity and pre-medicate as appropriate  Outcome: Progressing     Problem: SAFETY ADULT - FALL  Goal: Free from fall injury  Description: INTERVENTIONS:  - Assess pt frequently for physical needs  - Identify cognitive and physical deficits and behaviors that affect risk of falls.  - [place] fall precautions as indicated by assessment.  - Educate pt/family on patient safety including physical limitations  - Instruct pt to call for assistance with activity based on assessment  - Modify environment to reduce risk of injury  - Provide assistive devices as appropriate  - Consider OT/PT consult to assist with strengthening/mobility  - Encourage toileting schedule  Outcome: Progressing     Problem: DISCHARGE PLANNING  Goal: Discharge to home or other facility with appropriate resources  Description: INTERVENTIONS:  - Identify barriers to discharge w/pt and caregiver  - Include patient/family/discharge partner in discharge planning  - Arrange for needed discharge resources and transportation as appropriate  - Identify discharge learning needs (meds, wound care, etc)  - Arrange for interpreters to assist at discharge as needed  - Consider post-discharge preferences of patient/family/discharge partner  - Complete POLST form as appropriate  - Assess patient's ability to be responsible for managing their own health  - Refer to Case Management  Department for coordinating discharge planning if the patient needs post-hospital services based on physician/LIP order or complex needs related to functional status, cognitive ability or social support system  Outcome: Progressing     Problem: CARDIOVASCULAR - ADULT  Goal: Maintains optimal cardiac output and hemodynamic stability  Description: INTERVENTIONS:  - Monitor vital signs, rhythm, and trends  - Monitor for bleeding, hypotension and signs of decreased cardiac output  - Evaluate effectiveness of vasoactive medications to optimize hemodynamic stability  - Monitor arterial and/or venous puncture sites for bleeding and/or hematoma  - Assess quality of pulses, skin color and temperature  - Assess for signs of decreased coronary artery perfusion - ex. Angina  - Evaluate fluid balance, assess for edema, trend weights  Outcome: Progressing  Goal: Absence of cardiac arrhythmias or at baseline  Description: INTERVENTIONS:  - Continuous cardiac monitoring, monitor vital signs, obtain 12 lead EKG if indicated  - Evaluate effectiveness of antiarrhythmic and heart rate control medications as ordered  - Initiate emergency measures for life threatening arrhythmias  - Monitor electrolytes and administer replacement therapy as ordered  Outcome: Progressing     Problem: RESPIRATORY - ADULT  Goal: Achieves optimal ventilation and oxygenation  Description: INTERVENTIONS:  - Assess for changes in respiratory status  - Assess for changes in mentation and behavior  - Position to facilitate oxygenation and minimize respiratory effort  - Oxygen supplementation based on oxygen saturation or ABGs  - Provide Smoking Cessation handout, if applicable  - Encourage broncho-pulmonary hygiene including cough, deep breathe, Incentive Spirometry  - Assess the need for suctioning and perform as needed  - Assess and instruct to report SOB or any respiratory difficulty  - Respiratory Therapy support as indicated  - Manage/alleviate anxiety  -  Monitor for signs/symptoms of CO2 retention  Outcome: Progressing     Problem: GASTROINTESTINAL - ADULT  Goal: Maintains or returns to baseline bowel function  Description: INTERVENTIONS:  - Assess bowel function  - Maintain adequate hydration with IV or PO as ordered and tolerated  - Evaluate effectiveness of GI medications  - Encourage mobilization and activity  - Obtain nutritional consult as needed  - Establish a toileting routine/schedule  - Consider collaborating with pharmacy to review patient's medication profile  Outcome: Progressing

## 2024-10-14 NOTE — PROGRESS NOTES
[facility]      [facility] Cardiology Progress Note    Ollie Hernández Patient Status:  Inpatient    1945 MRN [id number]   Location [facility] 2W/SW Attending Pranay Michel MD   Hosp Day # 16 PCP Wili Parmar MD       Impression/Plan:  79 year old male presenting with:    Impression:  Hip fracture Left - S/P L anterior total hip arthroplasty 10/4/24  Acute resp failure, aspiration and/or pulmonary edema; intubated 2024 extubated 10/6  -Failed weaning trial yesterday  ESRD on HD  DM  PAF, currently SR. S/p watchman device 24  HTN  HL, on statin PTA  Acute on chronic diastolic CHF  CAD s/p [place] circ 24   Coffee grounds in OG tube  -Slight decline in Hb post-op  Elevated troponin, likely demand ischemia  VT-ns  1:24 this AM     Plan:  - Cont carvedilol for rate control BP. Follow BP as restarts PRN labetalol    - Cont statin   - Plavix restarted 10/8 Hb stable taking PO  monitor for bleeding (recent Watchman implant 2024) consider restarting ASA 81 if Hb con't to remain stable   - HD as per nephrology  - Monitor on tele  - Reviewed w/ wife and RN's  at bedside                   Subjective:     Denies dyspnea or chest pain.   Pt became tachy during dialysis today. 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)    Atherosclerosis of native arteries of extremities with intermittent claudication, bilateral legs  (HCC)    Primary hypertension    Smokers' cough (HCC)    Unstable angina (HCC)    Lower GI bleed    ESRD (end stage renal disease) on dialysis (HCC)    PAF (paroxysmal atrial fibrillation) (HCC)    AVM (arteriovenous malformation) of colon    ABLA (acute blood loss anemia)    Rectal bleeding    Anticoagulated    Antiplatelet or antithrombotic long-term use    Lower GI bleeding    ESRD on hemodialysis (HCC)    GI bleed    Closed displaced midcervical fracture of left femur with delayed healing    ESRD (end stage renal disease) (HCC)    Closed fracture of left hip (HCC)    Hyperphosphatemia    Respiratory failure (HCC)    Anemia       Objective:   Temp: 98 °F (36.7 °C)  Resp: 18  BP: 130/63    Intake/Output:     Intake/Output Summary (Last 24 hours) at 10/14/2024 1402  Last data filed at 10/14/2024 1040  Gross per 24 hour   Intake 50 ml   Output --   Net 50 ml         Last 3 Weights   10/05/24 0600 144 lb 10 oz (65.6 kg)   10/04/24 0600 145 lb 8.1 oz (66 kg)   10/02/24 1310 156 lb 4.9 oz (70.9 kg)   10/02/24 0800 143 lb 11.8 oz (65.2 kg)   10/01/24 0600 160 lb 0.9 oz (72.6 kg)   09/30/24 0400 164 lb 0.4 oz (74.4 kg)   09/28/24 1649 155 lb 11.2 oz (70.6 kg)   08/22/24 1311 159 lb 12.8 oz (72.5 kg)   08/20/24 0933 158 lb (71.7 kg)       Tele: SR/PACs, sinus tach    Physical Exam:    General: Awake   HEENT: Normocephalic, anicteric sclera NG tube out   Neck: supple  Cardiac: Regular rhythm. S1, S2 normal. No murmur, pericardial rub, S3, thrill, heave or extra cardiac sounds.  Lungs: Coarse breath sounds bilat  Abdomen: Soft, non-distended  Extremities: Without clubbing, cyanosis or edema.  SCD boots   Neurologic: Alert+confused   Skin: Warm and dry.     Laboratory/Data:    Labs:         Recent Labs   Lab 10/10/24  0345 10/11/24  0404 10/12/24  0332 10/13/24  0442 10/14/24  0427   WBC 20.0* 18.6* 20.5* 19.3* 19.1*   HGB 9.6* 7.9* 8.1* 8.5* 8.5*   MCV 92.4 94.2 96.3 93.6 96.1   .0 437.0 448.0 452.0* 428.0        Recent Labs   Lab 10/08/24  0957 10/09/24  0643 10/10/24  0345 10/11/24  0404 10/12/24  0332 10/13/24  0442 10/14/24  0427   *   < > 141 145 142 141 140   K 4.7   < > 4.8 4.6 4.5 4.9 4.7   CL 96*   < > 100 103 104 103 103   CO2 26.0   < > 27.0 31.0 27.0 25.0 24.0   BUN 61*   < > 43* 86* 48* 80* 112*   CREATSERUM 5.86*   < > 4.59* 6.95* 4.23* 6.45* 8.16*   CA 10.4   < > 9.9 9.9 10.3 10.3 10.1   MG  --   --   --   --   --   --  2.5   PHOS 8.6*  --  7.7*  --   --   --   --    *   < > 189* 234* 190* 198* 183*    < > = values in this interval not displayed.       Recent Labs   Lab 10/09/24  0643 10/10/24  0345 10/11/24  0404   ALT <7* <7* <7*   AST 53* 43* 29   ALB 3.6 3.9 3.5       No results for input(s): \"TROP\" in the last 168 hours.    Allergies:   Allergies   Allergen Reactions    Adhesive Tape OTHER (SEE COMMENTS)     Severe rashes    Dust Mite Extract RASH       Medications:  Current Facility-Administered Medications   Medication Dose Route Frequency    sodium chloride 0.9 % IV bolus 100 mL  100 mL Intravenous Q30 Min PRN    And    albumin human (Albumin) 25% injection 25 g  25 g Intravenous PRN Dialysis    lansoprazole (Prevacid Solutab) disintegrating tab 30 mg  30 mg Oral BID AC    ipratropium-albuterol (Duoneb) 0.5-2.5 (3) MG/3ML inhalation solution 3 mL  3 mL Nebulization 2 times daily    ipratropium-albuterol (Duoneb) 0.5-2.5 (3) MG/3ML inhalation solution 3 mL  3 mL Nebulization Q6H PRN    insulin degludec (Tresiba) 100 units/mL flextouch 10 Units  10 Units Subcutaneous Daily    insulin aspart (NovoLOG) 100 Units/mL FlexPen 3 Units  3 Units Subcutaneous TID CC    influenza virus trivalent high dose PF (Fluzone HD) 0.5 mL injection (ages >/= 65 years) 0.5 mL  0.5 mL Intramuscular Prior to discharge    clopidogrel (Plavix) tab 75 mg  75 mg Oral Daily    carvedilol (Coreg) tab 25 mg  25 mg Oral BID    sevelamer carbonate (Renvela) tab 800 mg  800 mg Oral TID CC    insulin aspart (NovoLOG)  100 Units/mL FlexPen 1-7 Units  1-7 Units Subcutaneous TID CC and HS    atorvastatin (Lipitor) tab 40 mg  40 mg Oral Nightly    HYDROmorphone (Dilaudid) 1 MG/ML injection 0.1 mg  0.1 mg Intravenous Q2H PRN    Or    HYDROmorphone (Dilaudid) 1 MG/ML injection 0.2 mg  0.2 mg Intravenous Q2H PRN    Or    HYDROmorphone (Dilaudid) 1 MG/ML injection 0.4 mg  0.4 mg Intravenous Q2H PRN    epoetin donna (Epogen, Procrit) 5,000 Units injection  5,000 Units Subcutaneous Once per day on Monday Wednesday Friday    metoclopramide (Reglan) 5 mg/mL injection 10 mg  10 mg Intravenous Q24H PRN    sodium chloride 0.9 % irrigation    Continuous PRN    sodium chloride 0.9 % irrigation    Continuous PRN    ondansetron (Zofran) 4 MG/2ML injection 4 mg  4 mg Intravenous Q6H PRN    diphenhydrAMINE (Benadryl) cap/tab 25 mg  25 mg Oral Q4H PRN    Or    diphenhydrAMINE (Benadryl) 50 mg/mL  injection 12.5 mg  12.5 mg Intravenous Q4H PRN    heparin (Porcine) 5000 UNIT/ML injection 5,000 Units  5,000 Units Subcutaneous Q8H STEPHANIE    dextrose 10% infusion (TPN no rate)   Intravenous Continuous PRN    pancrelipase (Lip-Prot-Amyl) (Zenpep) DR particles cap 10,000 Units  10,000 Units Per G Tube PRN    And    sodium bicarbonate tab 325 mg  325 mg Oral PRN    senna (Senokot) 8.8 MG/5ML oral syrup 17.6 mg  10 mL Oral BID    docusate (Colace) 50 MG/5ML oral solution 100 mg  100 mg Oral BID    mineral oil-white petrolatum (Artificial Tears) 83-15 % ophthalmic ointment 1 Application  1 Application Both Eyes Nightly    lidocaine-menthol 4-1 % patch 2 patch  2 patch Transdermal Daily    hydrALAzine (Apresoline) 20 mg/mL injection 10 mg  10 mg Intravenous Q6H PRN    labetalol (Trandate) 5 mg/mL injection 10 mg  10 mg Intravenous Q4H PRN    cetirizine (ZyrTEC) tab 5 mg  5 mg Per NG Tube Daily    acetaminophen (Tylenol) 160 MG/5ML oral liquid 500 mg  500 mg Per NG Tube Daily PRN    acetaminophen (Tylenol) tab 650 mg  650 mg Oral Q4H PRN    Or    acetaminophen  (Tylenol) 160 MG/5ML oral liquid 650 mg  650 mg Per NG Tube Q4H PRN    Or    acetaminophen (Tylenol) rectal suppository 650 mg  650 mg Rectal Q4H PRN    polyethylene glycol (PEG 3350) (Miralax) 17 g oral packet 17 g  17 g Per NG Tube Daily PRN    heparin (Porcine) 1000 UNIT/ML injection 1,500 Units  1.5 mL Intravenous PRN Dialysis    lidocaine-prilocaine (Emla) 2.5-2.5 % cream   Topical PRN    bisacodyl (Dulcolax) 10 MG rectal suppository 10 mg  10 mg Rectal Daily PRN    albuterol (Ventolin HFA) 108 (90 Base) MCG/ACT inhaler 2 puff  2 puff Inhalation Q4H PRN    fluticasone propionate (Flonase) 50 MCG/ACT nasal suspension 2 spray  2 spray Nasal Daily PRN    glucose (Dex4) 15 GM/59ML oral liquid 15 g  15 g Oral Q15 Min PRN    Or    glucose (Glutose) 40% oral gel 15 g  15 g Oral Q15 Min PRN    Or    glucose-vitamin C (Dex-4) chewable tab 4 tablet  4 tablet Oral Q15 Min PRN    Or    dextrose 50% injection 50 mL  50 mL Intravenous Q15 Min PRN    Or    glucose (Dex4) 15 GM/59ML oral liquid 30 g  30 g Oral Q15 Min PRN    Or    glucose (Glutose) 40% oral gel 30 g  30 g Oral Q15 Min PRN    Or    glucose-vitamin C (Dex-4) chewable tab 8 tablet  8 tablet Oral Q15 Min PRN    fluticasone-salmeterol (Advair Diskus) 250-50 MCG/ACT inhaler 1 puff  1 puff Inhalation BID

## 2024-10-15 ENCOUNTER — APPOINTMENT (OUTPATIENT)
Dept: GENERAL RADIOLOGY | Facility: HOSPITAL | Age: 79
End: 2024-10-15
Attending: HOSPITALIST
Payer: MEDICARE

## 2024-10-15 LAB
ANION GAP SERPL CALC-SCNC: 14 MMOL/L (ref 0–18)
BUN BLD-MCNC: 86 MG/DL (ref 9–23)
BUN/CREAT SERPL: 11.4 (ref 10–20)
CALCIUM BLD-MCNC: 10 MG/DL (ref 8.7–10.4)
CHLORIDE SERPL-SCNC: 101 MMOL/L (ref 98–112)
CO2 SERPL-SCNC: 24 MMOL/L (ref 21–32)
CREAT BLD-MCNC: 7.54 MG/DL
EGFRCR SERPLBLD CKD-EPI 2021: 7 ML/MIN/1.73M2 (ref 60–?)
GLUCOSE BLD-MCNC: 207 MG/DL (ref 70–99)
GLUCOSE BLDC GLUCOMTR-MCNC: 119 MG/DL (ref 70–99)
GLUCOSE BLDC GLUCOMTR-MCNC: 142 MG/DL (ref 70–99)
GLUCOSE BLDC GLUCOMTR-MCNC: 245 MG/DL (ref 70–99)
OSMOLALITY SERPL CALC.SUM OF ELEC: 320 MOSM/KG (ref 275–295)
POTASSIUM SERPL-SCNC: 4.9 MMOL/L (ref 3.5–5.1)
SODIUM SERPL-SCNC: 139 MMOL/L (ref 136–145)

## 2024-10-15 PROCEDURE — 99233 SBSQ HOSP IP/OBS HIGH 50: CPT | Performed by: HOSPITALIST

## 2024-10-15 PROCEDURE — 74230 X-RAY XM SWLNG FUNCJ C+: CPT | Performed by: HOSPITALIST

## 2024-10-15 PROCEDURE — 99232 SBSQ HOSP IP/OBS MODERATE 35: CPT | Performed by: INTERNAL MEDICINE

## 2024-10-15 RX ORDER — DILTIAZEM HYDROCHLORIDE 30 MG/1
30 TABLET, FILM COATED ORAL EVERY 6 HOURS SCHEDULED
Status: DISCONTINUED | OUTPATIENT
Start: 2024-10-15 | End: 2024-10-15

## 2024-10-15 RX ORDER — LIDOCAINE/PRILOCAINE 2.5 %-2.5%
CREAM (GRAM) TOPICAL AS NEEDED
Status: DISCONTINUED | OUTPATIENT
Start: 2024-10-15 | End: 2024-10-19

## 2024-10-15 RX ORDER — HEPARIN SODIUM 1000 [USP'U]/ML
1.5 INJECTION, SOLUTION INTRAVENOUS; SUBCUTANEOUS
Status: DISCONTINUED | OUTPATIENT
Start: 2024-10-15 | End: 2024-10-19

## 2024-10-15 RX ORDER — ALBUMIN (HUMAN) 12.5 G/50ML
25 SOLUTION INTRAVENOUS
Status: ACTIVE | OUTPATIENT
Start: 2024-10-15 | End: 2024-10-17

## 2024-10-15 NOTE — PLAN OF CARE
Patient is Aox4 on RA. POD 11. R arm precautions - [place]ist[place]. Dialysis MWF. Aox4 on RA, Cpap (noc). 2x asst w/ walker. Remote tele - one call, HR sustaining in 120's - paged cardio - See orders. ACHS. Honey thick liquids. Inc x2. Plan for Rehab.       Problem: Patient Centered Care  Goal: Patient preferences are identified and integrated in the patient's plan of care  Description: Interventions:  - What would you like us to know as we care for you? I get Dialysis MWF  - Provide timely, complete, and accurate information to patient/family  - Incorporate patient and family knowledge, values, beliefs, and cultural backgrounds into the planning and delivery of care  - Encourage patient/family to participate in care and decision-making at the level they choose  - Honor patient and family perspectives and choices  Outcome: Progressing     Problem: Diabetes/Glucose Control  Goal: Glucose maintained within prescribed range  Description: INTERVENTIONS:  - Monitor Blood Glucose as ordered  - Assess for signs and symptoms of hyperglycemia and hypoglycemia  - Administer ordered medications to maintain glucose within target range  - Assess barriers to adequate nutritional intake and initiate nutrition consult as needed  - Instruct patient on self management of diabetes  Outcome: Progressing     Problem: PAIN - ADULT  Goal: Verbalizes/displays adequate comfort level or patient's stated pain goal  Description: INTERVENTIONS:  - Encourage pt to monitor pain and request assistance  - Assess pain using appropriate pain scale  - Administer analgesics based on type and severity of pain and evaluate response  - Implement non-pharmacological measures as appropriate and evaluate response  - Consider cultural and social influences on pain and pain management  - Manage/alleviate anxiety  - Utilize distraction and/or relaxation techniques  - Monitor for opioid side effects  - Notify MD/LIP if interventions unsuccessful or patient reports new  pain  - Anticipate increased pain with activity and pre-medicate as appropriate  Outcome: Progressing     Problem: SAFETY ADULT - FALL  Goal: Free from fall injury  Description: INTERVENTIONS:  - Assess pt frequently for physical needs  - Identify cognitive and physical deficits and behaviors that affect risk of falls.  - [place] fall precautions as indicated by assessment.  - Educate pt/family on patient safety including physical limitations  - Instruct pt to call for assistance with activity based on assessment  - Modify environment to reduce risk of injury  - Provide assistive devices as appropriate  - Consider OT/PT consult to assist with strengthening/mobility  - Encourage toileting schedule  Outcome: Progressing     Problem: DISCHARGE PLANNING  Goal: Discharge to home or other facility with appropriate resources  Description: INTERVENTIONS:  - Identify barriers to discharge w/pt and caregiver  - Include patient/family/discharge partner in discharge planning  - Arrange for needed discharge resources and transportation as appropriate  - Identify discharge learning needs (meds, wound care, etc)  - Arrange for interpreters to assist at discharge as needed  - Consider post-discharge preferences of patient/family/discharge partner  - Complete POLST form as appropriate  - Assess patient's ability to be responsible for managing their own health  - Refer to Case Management Department for coordinating discharge planning if the patient needs post-hospital services based on physician/LIP order or complex needs related to functional status, cognitive ability or social support system  Outcome: Progressing     Problem: CARDIOVASCULAR - ADULT  Goal: Maintains optimal cardiac output and hemodynamic stability  Description: INTERVENTIONS:  - Monitor vital signs, rhythm, and trends  - Monitor for bleeding, hypotension and signs of decreased cardiac output  - Evaluate effectiveness of vasoactive medications to optimize  hemodynamic stability  - Monitor arterial and/or venous puncture sites for bleeding and/or hematoma  - Assess quality of pulses, skin color and temperature  - Assess for signs of decreased coronary artery perfusion - ex. Angina  - Evaluate fluid balance, assess for edema, trend weights  Outcome: Progressing  Goal: Absence of cardiac arrhythmias or at baseline  Description: INTERVENTIONS:  - Continuous cardiac monitoring, monitor vital signs, obtain 12 lead EKG if indicated  - Evaluate effectiveness of antiarrhythmic and heart rate control medications as ordered  - Initiate emergency measures for life threatening arrhythmias  - Monitor electrolytes and administer replacement therapy as ordered  Outcome: Progressing     Problem: RESPIRATORY - ADULT  Goal: Achieves optimal ventilation and oxygenation  Description: INTERVENTIONS:  - Assess for changes in respiratory status  - Assess for changes in mentation and behavior  - Position to facilitate oxygenation and minimize respiratory effort  - Oxygen supplementation based on oxygen saturation or ABGs  - Provide Smoking Cessation handout, if applicable  - Encourage broncho-pulmonary hygiene including cough, deep breathe, Incentive Spirometry  - Assess the need for suctioning and perform as needed  - Assess and instruct to report SOB or any respiratory difficulty  - Respiratory Therapy support as indicated  - Manage/alleviate anxiety  - Monitor for signs/symptoms of CO2 retention  Outcome: Progressing     Problem: GASTROINTESTINAL - ADULT  Goal: Maintains or returns to baseline bowel function  Description: INTERVENTIONS:  - Assess bowel function  - Maintain adequate hydration with IV or PO as ordered and tolerated  - Evaluate effectiveness of GI medications  - Encourage mobilization and activity  - Obtain nutritional consult as needed  - Establish a toileting routine/schedule  - Consider collaborating with pharmacy to review patient's medication profile  Outcome:  Progressing

## 2024-10-15 NOTE — PROGRESS NOTES
[facility]  part of [facility]    Progress Note    Ollie Hernández Patient Status:  Inpatient    1945 MRN [id number]   Location [facility] 4W/SW/SE Attending Ne Irizarry MD   Hosp Day # 17 PCP Wili Parmar MD       Subjective:   Ollie Hernández is a(n) 79 year old male     ROS:     Constitutional:  Negative for decreased activity, fever, irritability and lethargy doing well  ENMT:  Negative for ear drainage, hearing loss and nasal drainage  Eyes:  Negative for eye discharge and vision loss  Cardiovascular:  Negative for chest pain, sob, irregular heartbeat/palpitations  Respiratory:  Negative for cough, dyspnea and wheezing  Gastrointestinal:  Negative for abdominal pain, constipation, decreased appetite, diarrhea and vomiting  Genitourinary:  Negative for dysuria and hematuria  Endocrine:  Negative for abnormal sleep patterns, increased activity, polydipsia and polyphagia  Hema/Lymph:  Negative for easy bleeding and easy bruising  Integumentary:  Negative for pruritus and rash still complaining of skin breakdown on his buttocks  Musculoskeletal:  Negative for bone/joint symptoms  Neurological:  Negative for gait disturbance  Psychiatric:  Negative for inappropriate interaction and psychiatric symptoms      Vitals:    10/15/24 1611   BP: 123/63   Pulse:    Resp: 20   Temp: 97.8 °F (36.6 °C)           PHYSICAL EXAM:   Constitutional: appears well hydrated alert and responsive no acute distress noted  Head/Face: normocephalic  Eyes/Vision: normal extraocular motion is intact  Nose/Mouth/Throat:mucous membranes are moist and no oral lesions are noted  Neck/Thyroid: neck is supple without adenopathy  Lymphatic: no abnormal cervical, supraclavicular adenopathy is noted  Respiratory:  lungs are clear to auscultation bilaterally, normal respiratory effort  Cardiovascular: regular rate and rhythm no murmurs, gallups, or rubs  Abdomen: soft, non-tender, non-distended, BS  normal  Vascular: well perfused femoral, and pedal pulses normal  Skin/Hair: no unusual rashes present, no abnormal bruising noted  Back/Spine: no abnormalities noted  Musculoskeletal: full ROM all extremities good strength  no deformities  Extremities: no edema, cyanosis  Neurological:  Grossly normal    Results:     Laboratory Data:  Lab Results   Component Value Date    WBC 19.1 (H) 10/14/2024    HGB 8.5 (L) 10/14/2024    HCT 27.1 (L) 10/14/2024    .0 10/14/2024    CREATSERUM 7.54 (H) 10/15/2024    BUN 86 (H) 10/15/2024     10/15/2024    K 4.9 10/15/2024     10/15/2024    CO2 24.0 10/15/2024     (H) 10/15/2024    CA 10.0 10/15/2024    ALB 3.5 10/11/2024    ALKPHO 115 10/11/2024    BILT 0.3 10/11/2024    TP 6.7 10/11/2024    AST 29 10/11/2024    ALT <7 (L) 10/11/2024    PTT 30.1 08/09/2024    INR 1.45 (H) 08/09/2024    T4F 0.9 08/31/2022    TSH 2.844 07/04/2024     (H) 07/30/2024    PSA 2.94 10/20/2021    DDIMER 6.07 (H) 09/29/2024    ESRML 10 06/16/2024    CRP 1.30 (H) 06/16/2024    MG 2.5 10/14/2024    PHOS 7.7 (H) 10/10/2024    TROP <0.045 07/25/2019     08/05/2023    B12 672 07/04/2024       Imaging:  @[employer]@   [employer] VIDEO SWALLOW (CPT=74230)    Result Date: 10/15/2024  CONCLUSION:  1. Recurrent large volume aspiration of various barium preparations with cough response.    Dictated by (CST): Juan Bolden MD on 10/15/2024 at 12:50 PM     Finalized by (CST): Juan Bolden MD on 10/15/2024 at 12:55 PM             ASSESSMENT/PLAN:   Assessment       #1 hip fracture doing well with physical therapy healing  #2 history of GI bleed  Hemoglobin stable at 8.5 on Depo  #3 ESRD plan dialysis tomorrow   Continue present plan will need rehab      10/15/2024  José Callahan MD

## 2024-10-15 NOTE — PROGRESS NOTES
[facility]      [facility] Progress Note    Ollie Hernández Patient Status:  Inpatient    1945 MRN [id number]   Location [facility] 2W/SW Attending Pranay Michel MD   Hosp Day # 17 PCP Wili Parmar MD       Impression/Plan:  79 year old male presenting with:    Impression:  Hip fracture Left - S/P L anterior total hip arthroplasty 10/4/24  Acute resp failure, aspiration and/or pulmonary edema; intubated 2024 extubated 10/6  -Failed weaning trial yesterday  ESRD on HD  DM  PAF, currently SR. S/p watchman device 24  HTN  HL, on statin PTA  Acute on chronic diastolic CHF  CAD s/p [place] circ 24   Coffee grounds in OG tube  -Slight decline in Hb post-op  Elevated troponin, likely demand ischemia  VT-ns  1:24 this AM     Plan:  - Cont carvedilol for rate control BP. Follow BP as restarts PRN labetalol    - Cont statin   - Plavix restarted 10/8 Hb stable taking PO  monitor for bleeding (recent Watchman implant 2024) consider restarting ASA 81 if Hb con't to remain stable   - HD as per nephrology  - Monitor on tele  - Reviewed w/ wife and RN's  at bedside   - Not clear cause for sinus tach possibly pain , hypotension  BB held rebound  , low volume. Hb has been stable.                 Subjective:     Denies dyspnea or chest pain.   Pt became tachy during dialysis 10/14. 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)    Atherosclerosis of native arteries of extremities with intermittent claudication, bilateral legs (HCC)    Primary hypertension    Smokers' cough (HCC)    Unstable angina (HCC)    Lower GI bleed    ESRD (end stage renal disease) on dialysis (HCC)    PAF (paroxysmal atrial fibrillation) (HCC)    AVM (arteriovenous malformation) of colon    ABLA (acute blood loss anemia)    Rectal bleeding    Anticoagulated    Antiplatelet or antithrombotic long-term use    Lower GI bleeding    ESRD on hemodialysis (HCC)    GI bleed    Closed displaced midcervical fracture of left femur with delayed healing    ESRD (end stage renal disease) (HCC)    Closed fracture of left hip (HCC)    Hyperphosphatemia    Respiratory failure (HCC)    Anemia       Objective:   Temp: 98.1 °F (36.7 °C)  Pulse: 124  Resp: 18  BP: 119/60    Intake/Output:     Intake/Output Summary (Last 24 hours) at 10/15/2024 0733  Last data filed at 10/14/2024 1904  Gross per 24 hour   Intake 150 ml   Output --   Net 150 ml         Last 3 Weights   10/05/24 0600 144 lb 10 oz (65.6 kg)   10/04/24 0600 145 lb 8.1 oz (66 kg)   10/02/24 1310 156 lb 4.9 oz (70.9 kg)   10/02/24 0800 143 lb 11.8 oz (65.2 kg)   10/01/24 0600 160 lb 0.9 oz (72.6 kg)   09/30/24 0400 164 lb 0.4 oz (74.4 kg)   09/28/24 1649 155 lb 11.2 oz (70.6 kg)   08/22/24 1311 159 lb 12.8 oz (72.5 kg)   08/20/24 0933 158 lb (71.7 kg)       Tele: SR/PACs, sinus tach    Physical Exam:    General: Awake   HEENT: Normocephalic, anicteric sclera NG tube out   Neck: supple  Cardiac: Regular rhythm. S1, S2 normal. No murmur, pericardial rub, S3, thrill, heave or extra cardiac sounds.  Lungs: Coarse breath sounds bilat  Abdomen: Soft, non-distended  Extremities: Without clubbing, cyanosis or edema.  SCD boots   Neurologic: Alert+confused   Skin: Warm and dry.     Laboratory/Data:    Labs:         Recent Labs   Lab 10/10/24  0345 10/11/24  0404 10/12/24  0330  10/13/24  0442 10/14/24  0427   WBC 20.0* 18.6* 20.5* 19.3* 19.1*   HGB 9.6* 7.9* 8.1* 8.5* 8.5*   MCV 92.4 94.2 96.3 93.6 96.1   .0 437.0 448.0 452.0* 428.0       Recent Labs   Lab 10/08/24  0957 10/09/24  0643 10/10/24  0345 10/11/24  0404 10/12/24  0332 10/13/24  0442 10/14/24  0427   *   < > 141 145 142 141 140   K 4.7   < > 4.8 4.6 4.5 4.9 4.7   CL 96*   < > 100 103 104 103 103   CO2 26.0   < > 27.0 31.0 27.0 25.0 24.0   BUN 61*   < > 43* 86* 48* 80* 112*   CREATSERUM 5.86*   < > 4.59* 6.95* 4.23* 6.45* 8.16*   CA 10.4   < > 9.9 9.9 10.3 10.3 10.1   MG  --   --   --   --   --   --  2.5   PHOS 8.6*  --  7.7*  --   --   --   --    *   < > 189* 234* 190* 198* 183*    < > = values in this interval not displayed.       Recent Labs   Lab 10/09/24  0643 10/10/24  0345 10/11/24  0404   ALT <7* <7* <7*   AST 53* 43* 29   ALB 3.6 3.9 3.5       No results for input(s): \"TROP\" in the last 168 hours.    Allergies:   Allergies   Allergen Reactions    Adhesive Tape OTHER (SEE COMMENTS)     Severe rashes    Dust Mite Extract RASH       Medications:  Current Facility-Administered Medications   Medication Dose Route Frequency    dilTIAZem (cardIZEM) tab 30 mg  30 mg Oral 4 times per day    sodium chloride 0.9 % IV bolus 100 mL  100 mL Intravenous Q30 Min PRN    And    albumin human (Albumin) 25% injection 25 g  25 g Intravenous PRN Dialysis    lansoprazole (Prevacid Solutab) disintegrating tab 30 mg  30 mg Oral BID AC    ipratropium-albuterol (Duoneb) 0.5-2.5 (3) MG/3ML inhalation solution 3 mL  3 mL Nebulization 2 times daily    ipratropium-albuterol (Duoneb) 0.5-2.5 (3) MG/3ML inhalation solution 3 mL  3 mL Nebulization Q6H PRN    insulin degludec (Tresiba) 100 units/mL flextouch 10 Units  10 Units Subcutaneous Daily    insulin aspart (NovoLOG) 100 Units/mL FlexPen 3 Units  3 Units Subcutaneous TID CC    influenza virus trivalent high dose PF (Fluzone HD) 0.5 mL injection (ages >/= 65 years) 0.5 mL  0.5 mL  Intramuscular Prior to discharge    clopidogrel (Plavix) tab 75 mg  75 mg Oral Daily    carvedilol (Coreg) tab 25 mg  25 mg Oral BID    sevelamer carbonate (Renvela) tab 800 mg  800 mg Oral TID CC    insulin aspart (NovoLOG) 100 Units/mL FlexPen 1-7 Units  1-7 Units Subcutaneous TID CC and HS    atorvastatin (Lipitor) tab 40 mg  40 mg Oral Nightly    HYDROmorphone (Dilaudid) 1 MG/ML injection 0.1 mg  0.1 mg Intravenous Q2H PRN    Or    HYDROmorphone (Dilaudid) 1 MG/ML injection 0.2 mg  0.2 mg Intravenous Q2H PRN    Or    HYDROmorphone (Dilaudid) 1 MG/ML injection 0.4 mg  0.4 mg Intravenous Q2H PRN    epoetin donna (Epogen, Procrit) 5,000 Units injection  5,000 Units Subcutaneous Once per day on Monday Wednesday Friday    metoclopramide (Reglan) 5 mg/mL injection 10 mg  10 mg Intravenous Q24H PRN    sodium chloride 0.9 % irrigation    Continuous PRN    sodium chloride 0.9 % irrigation    Continuous PRN    ondansetron (Zofran) 4 MG/2ML injection 4 mg  4 mg Intravenous Q6H PRN    diphenhydrAMINE (Benadryl) cap/tab 25 mg  25 mg Oral Q4H PRN    Or    diphenhydrAMINE (Benadryl) 50 mg/mL  injection 12.5 mg  12.5 mg Intravenous Q4H PRN    heparin (Porcine) 5000 UNIT/ML injection 5,000 Units  5,000 Units Subcutaneous Q8H STEPHANIE    dextrose 10% infusion (TPN no rate)   Intravenous Continuous PRN    pancrelipase (Lip-Prot-Amyl) (Zenpep) DR particles cap 10,000 Units  10,000 Units Per G Tube PRN    And    sodium bicarbonate tab 325 mg  325 mg Oral PRN    senna (Senokot) 8.8 MG/5ML oral syrup 17.6 mg  10 mL Oral BID    docusate (Colace) 50 MG/5ML oral solution 100 mg  100 mg Oral BID    mineral oil-white petrolatum (Artificial Tears) 83-15 % ophthalmic ointment 1 Application  1 Application Both Eyes Nightly    lidocaine-menthol 4-1 % patch 2 patch  2 patch Transdermal Daily    hydrALAzine (Apresoline) 20 mg/mL injection 10 mg  10 mg Intravenous Q6H PRN    labetalol (Trandate) 5 mg/mL injection 10 mg  10 mg Intravenous Q4H PRN     cetirizine (ZyrTEC) tab 5 mg  5 mg Per NG Tube Daily    acetaminophen (Tylenol) 160 MG/5ML oral liquid 500 mg  500 mg Per NG Tube Daily PRN    acetaminophen (Tylenol) tab 650 mg  650 mg Oral Q4H PRN    Or    acetaminophen (Tylenol) 160 MG/5ML oral liquid 650 mg  650 mg Per NG Tube Q4H PRN    Or    acetaminophen (Tylenol) rectal suppository 650 mg  650 mg Rectal Q4H PRN    polyethylene glycol (PEG 3350) (Miralax) 17 g oral packet 17 g  17 g Per NG Tube Daily PRN    heparin (Porcine) 1000 UNIT/ML injection 1,500 Units  1.5 mL Intravenous PRN Dialysis    lidocaine-prilocaine (Emla) 2.5-2.5 % cream   Topical PRN    bisacodyl (Dulcolax) 10 MG rectal suppository 10 mg  10 mg Rectal Daily PRN    albuterol (Ventolin HFA) 108 (90 Base) MCG/ACT inhaler 2 puff  2 puff Inhalation Q4H PRN    fluticasone propionate (Flonase) 50 MCG/ACT nasal suspension 2 spray  2 spray Nasal Daily PRN    glucose (Dex4) 15 GM/59ML oral liquid 15 g  15 g Oral Q15 Min PRN    Or    glucose (Glutose) 40% oral gel 15 g  15 g Oral Q15 Min PRN    Or    glucose-vitamin C (Dex-4) chewable tab 4 tablet  4 tablet Oral Q15 Min PRN    Or    dextrose 50% injection 50 mL  50 mL Intravenous Q15 Min PRN    Or    glucose (Dex4) 15 GM/59ML oral liquid 30 g  30 g Oral Q15 Min PRN    Or    glucose (Glutose) 40% oral gel 30 g  30 g Oral Q15 Min PRN    Or    glucose-vitamin C (Dex-4) chewable tab 8 tablet  8 tablet Oral Q15 Min PRN    fluticasone-salmeterol (Advair Diskus) 250-50 MCG/ACT inhaler 1 puff  1 puff Inhalation BID

## 2024-10-15 NOTE — PLAN OF CARE
Ollie is A&Ox4. Pt ambulates with 2 assist and walker, monitoring blood glucose levels per order, patient is voiding. NPO d/t aspiration during video swallow. Incision dressing changed today. Frequent rounding by nursing staff. Safety precautions maintained/call light within reach.   Problem: Patient Centered Care  Goal: Patient preferences are identified and integrated in the patient's plan of care  Description: Interventions:  - What would you like us to know as we care for you?   - Provide timely, complete, and accurate information to patient/family  - Incorporate patient and family knowledge, values, beliefs, and cultural backgrounds into the planning and delivery of care  - Encourage patient/family to participate in care and decision-making at the level they choose  - Honor patient and family perspectives and choices  Outcome: Progressing     Problem: Diabetes/Glucose Control  Goal: Glucose maintained within prescribed range  Description: INTERVENTIONS:  - Monitor Blood Glucose as ordered  - Assess for signs and symptoms of hyperglycemia and hypoglycemia  - Administer ordered medications to maintain glucose within target range  - Assess barriers to adequate nutritional intake and initiate nutrition consult as needed  - Instruct patient on self management of diabetes  Outcome: Progressing     Problem: PAIN - ADULT  Goal: Verbalizes/displays adequate comfort level or patient's stated pain goal  Description: INTERVENTIONS:  - Encourage pt to monitor pain and request assistance  - Assess pain using appropriate pain scale  - Administer analgesics based on type and severity of pain and evaluate response  - Implement non-pharmacological measures as appropriate and evaluate response  - Consider cultural and social influences on pain and pain management  - Manage/alleviate anxiety  - Utilize distraction and/or relaxation techniques  - Monitor for opioid side effects  - Notify MD/LIP if interventions unsuccessful or  patient reports new pain  - Anticipate increased pain with activity and pre-medicate as appropriate  Outcome: Progressing     Problem: SAFETY ADULT - FALL  Goal: Free from fall injury  Description: INTERVENTIONS:  - Assess pt frequently for physical needs  - Identify cognitive and physical deficits and behaviors that affect risk of falls.  - [place] fall precautions as indicated by assessment.  - Educate pt/family on patient safety including physical limitations  - Instruct pt to call for assistance with activity based on assessment  - Modify environment to reduce risk of injury  - Provide assistive devices as appropriate  - Consider OT/PT consult to assist with strengthening/mobility  - Encourage toileting schedule  Outcome: Progressing     Problem: DISCHARGE PLANNING  Goal: Discharge to home or other facility with appropriate resources  Description: INTERVENTIONS:  - Identify barriers to discharge w/pt and caregiver  - Include patient/family/discharge partner in discharge planning  - Arrange for needed discharge resources and transportation as appropriate  - Identify discharge learning needs (meds, wound care, etc)  - Arrange for interpreters to assist at discharge as needed  - Consider post-discharge preferences of patient/family/discharge partner  - Complete POLST form as appropriate  - Assess patient's ability to be responsible for managing their own health  - Refer to Case Management Department for coordinating discharge planning if the patient needs post-hospital services based on physician/LIP order or complex needs related to functional status, cognitive ability or social support system  Outcome: Progressing     Problem: CARDIOVASCULAR - ADULT  Goal: Maintains optimal cardiac output and hemodynamic stability  Description: INTERVENTIONS:  - Monitor vital signs, rhythm, and trends  - Monitor for bleeding, hypotension and signs of decreased cardiac output  - Evaluate effectiveness of vasoactive medications  to optimize hemodynamic stability  - Monitor arterial and/or venous puncture sites for bleeding and/or hematoma  - Assess quality of pulses, skin color and temperature  - Assess for signs of decreased coronary artery perfusion - ex. Angina  - Evaluate fluid balance, assess for edema, trend weights  Outcome: Progressing  Goal: Absence of cardiac arrhythmias or at baseline  Description: INTERVENTIONS:  - Continuous cardiac monitoring, monitor vital signs, obtain 12 lead EKG if indicated  - Evaluate effectiveness of antiarrhythmic and heart rate control medications as ordered  - Initiate emergency measures for life threatening arrhythmias  - Monitor electrolytes and administer replacement therapy as ordered  Outcome: Progressing     Problem: RESPIRATORY - ADULT  Goal: Achieves optimal ventilation and oxygenation  Description: INTERVENTIONS:  - Assess for changes in respiratory status  - Assess for changes in mentation and behavior  - Position to facilitate oxygenation and minimize respiratory effort  - Oxygen supplementation based on oxygen saturation or ABGs  - Provide Smoking Cessation handout, if applicable  - Encourage broncho-pulmonary hygiene including cough, deep breathe, Incentive Spirometry  - Assess the need for suctioning and perform as needed  - Assess and instruct to report SOB or any respiratory difficulty  - Respiratory Therapy support as indicated  - Manage/alleviate anxiety  - Monitor for signs/symptoms of CO2 retention  Outcome: Progressing     Problem: GASTROINTESTINAL - ADULT  Goal: Maintains or returns to baseline bowel function  Description: INTERVENTIONS:  - Assess bowel function  - Maintain adequate hydration with IV or PO as ordered and tolerated  - Evaluate effectiveness of GI medications  - Encourage mobilization and activity  - Obtain nutritional consult as needed  - Establish a toileting routine/schedule  - Consider collaborating with pharmacy to review patient's medication  profile  Outcome: Progressing

## 2024-10-15 NOTE — CONSULTS
REFERRING PHYSICIAN: Dr. Pearson ref. provider found    HPI:         Thank you very much for requesting me to see the patient. REASON for consultation: consideration for PEG tube placement.     As you know, Ollie Hernández is a 79 year old male with DM, ESRD (HD on MWF) who admitted 9/28/2024 with left femoral neck fracture after a fall. Was transferred to ICU 9/29/2024 due to severe HTN and SOB  -- Acute hypoxic respiratory failure.  10/04/2024 underwent Left total hip arthroplasty.  Intubated 9/29- extubated on 10/6/2024. Completed 5 days of empiric zosyn / aspiration pneumonia. Patient completed speech evaluation / VFSS  and noted to have severe oropharyngeal dysphagia.          PMH: anemia; asthma; back problem; kidney stone; cataract; DM; ESRD on dialysis; HTN; dyslipidemia; neuropathy, B hands / feet; KRAIG / CPAP; unilateral vocal cord paralysis; paroxysmal a-fib, Acute on chronic HFpEF; watchman device 9/11/24;     PMH-GI:      8/9/2024 -- consult Dr. Bustamante -- \"Ollie was admitted 6/2024 and again 2 weeks ago with similar presentation of maroon colored stools.  EGD/Colonoscopy 6/2024 with unremarkable EGD, Colonoscopy with bleeding AVM vs Dieulafoy in the Hepatic Flexure s/p APC.  Had repeat EGD/Colonoscopy 7/31/24 with again normal EGD and Colonoscopy with evidence of the previously placed clip in the hepatic flexure region.  Mucosa that it was attached to was erythematous, however no bleeding was noted.  This area was APC'd.  Discussed at that time that patient would be considered for Watchman given recurrent bleeding.  Last video capsule endoscopy 2016 for evaluation of CHELSEY.  Presents with Hgb 5.3 and on last discharge 10.5.  CT Angio negative for active bleeding.  INR 1.45.   BUN 33 and Cr 3.16.   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  - - - Impression: Rectal Bleeding; Hx Colon AVM: Afib on  Eliquis: - Patient presents back with similar to prior admissions with rectal bleeding of maroon colored stools, lack of abdominal pain, nausea, emesis, weight loss. - - - We discussed options of repeat Colonoscopy given known bleed in the hepatic flexure but low yield given repeat Colonoscopy and further treatment.  Patient and wife also defer repeat Colonoscopy.  EGD not recommended given negative exam x 2.     Plan: - Will proceed with VCE tomorrow to evaluate for small bowel etiology of anemia. - Ideally needs to be considered for Watchman Procedure and eventual discontinuation of anticoagulation. - Transfuse to goal Hgb > 8.0 given cardiac history. - -\"      8/10/24 -- Capsule endoscopy -- \"Indication: GI bleeding of obscure origin. - -Findings:  - Capsule retention in the stomach for the majority of the exam.  - - - No evidence of old or fresh blood in the in limited views of the duodenum.  Impression:   Incomplete exam  No active bleeding on limited exam.  Recommendation:  1. KUB to confirm capsule passage to cecum 2. Resume Eliquis however agree to discuss with Cardiology regarding decreasing dosage.\"     SOC: wife = Stephy Hernández     Fhx: NC            Past Surgical History:   Procedure Laterality Date    Appendectomy      1981    Back surgery      Neck/back - 1998    Capsule endoscopy - internal referral      Cataract      12/2021 and 1/2022    Cath bare metal stent (bms)      Colonoscopy      Colonoscopy N/A 01/25/2021    Procedure: COLONOSCOPY;  Surgeon: Michael Gautam MD;  Location: [facility] ENDO    Colonoscopy N/A 06/03/2024    Procedure: COLONOSCOPY;  Surgeon: Gabriel Saldana MD;  Location: [facility] ENDOSCOPY    Colonoscopy N/A 06/15/2024    Procedure: COLONOSCOPY;  Surgeon: Carlyn Storey MD;  Location: [facility] ENDOSCOPY    Colonoscopy N/A 7/31/2024    Procedure: COLONOSCOPY;  Surgeon: Gabriel Saldana MD;  Location: [facility] ENDOSCOPY    Hand/finger surgery unlisted      Accidental trauma    Spine surgery  procedure unlisted      Upper gi endoscopy,diagnosis       Social History     Socioeconomic History    Marital status:    Tobacco Use    Smoking status: Former     Current packs/day: 0.00     Average packs/day: 1 pack/day for 17.0 years (17.0 ttl pk-yrs)     Types: Cigarettes     Start date: 1964     Quit date: 1981     Years since quittin.8    Smokeless tobacco: Never   Vaping Use    Vaping status: Never Used   Substance and Sexual Activity    Alcohol use: No     Alcohol/week: 0.0 standard drinks of alcohol    Drug use: No    Sexual activity: Yes     Partners: Female     Social Drivers of Health     Financial Resource Strain: Low Risk  (2024)    Received from [facility]    Financial Resource Strain     In the past year, have you or any family members you live with been unable to get any of the following when it was really needed? Check all that apply.: None   Food Insecurity: No Food Insecurity (2024)    Food Insecurity     Food Insecurity: Never true   Transportation Needs: No Transportation Needs (2024)    Transportation Needs     Lack of Transportation: No   Recent Concern: Transportation Needs - At Risk (2024)    Received from [facility]    Transportation Needs     In the past 12 months, has lack of reliable transportation kept you from medical appointments, meetings, work or from getting things needed for daily living? : Yes   Social Connections: Low Risk  (2024)    Received from [facility]    Social Connections     How often do you see or talk to people that you care about and feel close to? (For example: talking to friends on the phone, visiting friends or family, going to [employer] or club meetings): 5 or more times a week   Housing Stability: Low Risk  (2024)    Housing Stability     Housing Instability: No         Current Facility-Administered Medications   Medication Dose Route Frequency    lansoprazole (Prevacid Solutab)  disintegrating tab 30 mg  30 mg Oral BID AC    ipratropium-albuterol (Duoneb) 0.5-2.5 (3) MG/3ML inhalation solution 3 mL  3 mL Nebulization 2 times daily    ipratropium-albuterol (Duoneb) 0.5-2.5 (3) MG/3ML inhalation solution 3 mL  3 mL Nebulization Q6H PRN    insulin degludec (Tresiba) 100 units/mL flextouch 10 Units  10 Units Subcutaneous Daily    insulin aspart (NovoLOG) 100 Units/mL FlexPen 3 Units  3 Units Subcutaneous TID CC    influenza virus trivalent high dose PF (Fluzone HD) 0.5 mL injection (ages >/= 65 years) 0.5 mL  0.5 mL Intramuscular Prior to discharge    clopidogrel (Plavix) tab 75 mg  75 mg Oral Daily    carvedilol (Coreg) tab 25 mg  25 mg Oral BID    sevelamer carbonate (Renvela) tab 800 mg  800 mg Oral TID CC    insulin aspart (NovoLOG) 100 Units/mL FlexPen 1-7 Units  1-7 Units Subcutaneous TID CC and HS    atorvastatin (Lipitor) tab 40 mg  40 mg Oral Nightly    HYDROmorphone (Dilaudid) 1 MG/ML injection 0.1 mg  0.1 mg Intravenous Q2H PRN    Or    HYDROmorphone (Dilaudid) 1 MG/ML injection 0.2 mg  0.2 mg Intravenous Q2H PRN    Or    HYDROmorphone (Dilaudid) 1 MG/ML injection 0.4 mg  0.4 mg Intravenous Q2H PRN    epoetin donna (Epogen, Procrit) 5,000 Units injection  5,000 Units Subcutaneous Once per day on Monday Wednesday Friday    metoclopramide (Reglan) 5 mg/mL injection 10 mg  10 mg Intravenous Q24H PRN    sodium chloride 0.9 % irrigation    Continuous PRN    sodium chloride 0.9 % irrigation    Continuous PRN    ondansetron (Zofran) 4 MG/2ML injection 4 mg  4 mg Intravenous Q6H PRN    diphenhydrAMINE (Benadryl) cap/tab 25 mg  25 mg Oral Q4H PRN    Or    diphenhydrAMINE (Benadryl) 50 mg/mL  injection 12.5 mg  12.5 mg Intravenous Q4H PRN    heparin (Porcine) 5000 UNIT/ML injection 5,000 Units  5,000 Units Subcutaneous Q8H STEPHANIE    dextrose 10% infusion (TPN no rate)   Intravenous Continuous PRN    pancrelipase (Lip-Prot-Amyl) (Zenpep) DR particles cap 10,000 Units  10,000 Units Per G Tube PRN     And    sodium bicarbonate tab 325 mg  325 mg Oral PRN    senna (Senokot) 8.8 MG/5ML oral syrup 17.6 mg  10 mL Oral BID    docusate (Colace) 50 MG/5ML oral solution 100 mg  100 mg Oral BID    mineral oil-white petrolatum (Artificial Tears) 83-15 % ophthalmic ointment 1 Application  1 Application Both Eyes Nightly    lidocaine-menthol 4-1 % patch 2 patch  2 patch Transdermal Daily    hydrALAzine (Apresoline) 20 mg/mL injection 10 mg  10 mg Intravenous Q6H PRN    labetalol (Trandate) 5 mg/mL injection 10 mg  10 mg Intravenous Q4H PRN    cetirizine (ZyrTEC) tab 5 mg  5 mg Per NG Tube Daily    acetaminophen (Tylenol) 160 MG/5ML oral liquid 500 mg  500 mg Per NG Tube Daily PRN    acetaminophen (Tylenol) tab 650 mg  650 mg Oral Q4H PRN    Or    acetaminophen (Tylenol) 160 MG/5ML oral liquid 650 mg  650 mg Per NG Tube Q4H PRN    Or    acetaminophen (Tylenol) rectal suppository 650 mg  650 mg Rectal Q4H PRN    polyethylene glycol (PEG 3350) (Miralax) 17 g oral packet 17 g  17 g Per NG Tube Daily PRN    heparin (Porcine) 1000 UNIT/ML injection 1,500 Units  1.5 mL Intravenous PRN Dialysis    lidocaine-prilocaine (Emla) 2.5-2.5 % cream   Topical PRN    bisacodyl (Dulcolax) 10 MG rectal suppository 10 mg  10 mg Rectal Daily PRN    albuterol (Ventolin HFA) 108 (90 Base) MCG/ACT inhaler 2 puff  2 puff Inhalation Q4H PRN    fluticasone propionate (Flonase) 50 MCG/ACT nasal suspension 2 spray  2 spray Nasal Daily PRN    glucose (Dex4) 15 GM/59ML oral liquid 15 g  15 g Oral Q15 Min PRN    Or    glucose (Glutose) 40% oral gel 15 g  15 g Oral Q15 Min PRN    Or    glucose-vitamin C (Dex-4) chewable tab 4 tablet  4 tablet Oral Q15 Min PRN    Or    dextrose 50% injection 50 mL  50 mL Intravenous Q15 Min PRN    Or    glucose (Dex4) 15 GM/59ML oral liquid 30 g  30 g Oral Q15 Min PRN    Or    glucose (Glutose) 40% oral gel 30 g  30 g Oral Q15 Min PRN    Or    glucose-vitamin C (Dex-4) chewable tab 8 tablet  8 tablet Oral Q15 Min PRN     fluticasone-salmeterol (Advair Diskus) 250-50 MCG/ACT inhaler 1 puff  1 puff Inhalation BID       Allergies[1]    A comprehensive 10 point review of systems was completed.  Pertinent positives and negatives noted in the the HPI.    EXAM:  /46 (BP Location: Radial)   Pulse (!) 124   Temp 98.1 °F (36.7 °C) (Temporal)   Resp 18   Ht 5' 4.02\" (1.626 m)   Wt 144 lb 10 oz (65.6 kg)   SpO2 98%   BMI 24.81 kg/m²  -Body mass index is 24.81 kg/m².  GENERAL: well developed, well nourished, in no apparent distress  SKIN: no rashes, no suspicious lesions  HEENT: anicteric; no JVD.  NECK: supple, no adenopathy, no bruits  LUNGS: clear to auscultation  CARDIO: RRR, nl s1 and s2. No murmers appreciated.  GI: Soft, NT, ND, normal BS. NO HSM, masses, hernias or bruits.  EXTREMITIES: no cyanosis, clubbing or edema    ___________________________________________________________    ASSESSMENT: In summary this is a 79 year old male admitted  who was admitted 9/28/2024 secondary to left femoral neck fracture after a fall. Was transferred to ICU 9/29/2024 due to severe HTN and SOB  -- Acute hypoxic respiratory failure.  10/04/2024 underwent Left total hip arthroplasty.  Intubated 9/29- extubated on 10/6/2024. Completed 5 days of empiric zosyn / aspiration pneumonia. Patient completed speech evaluation / VFSS  and noted to have severe oropharyngeal dysphagia. h/o  DM, ESRD (HD on MWF) who admitted 9/28/2024 with left femoral neck fracture after a fall. Was transferred to ICU 9/29/2024 due to severe HTN and SOB  -- Acute hypoxic respiratory failure.  10/04/2024 underwent Left total hip arthroplasty.  Intubated 9/29- extubated on 10/6/2024. Completed 5 days of empiric zosyn / aspiration pneumonia. Patient completed speech evaluation / VFSS  and noted to have severe oropharyngeal dysphagia.     ACTIVE ISSUES INCLUDE:     Sever oropharyngeal dysphagia -- tentatively plan for EGD / PEG 10/19/2024 -- d/w pt and pt's spouse at bedside.  I / R discussed with patient in detail. Hold Plavix.     2. Left femur fx. S/p ORIF.     3. ESRD / HD     4. DM           The patient indicates understanding of these issues and agrees to the plan. Thank you!       Yash Sheppard M.D.       [facility]  ___________________________________________________________               [1]   Allergies  Allergen Reactions    Adhesive Tape OTHER (SEE COMMENTS)     Severe rashes    Dust Mite Extract RASH

## 2024-10-15 NOTE — SLP NOTE
ADULT VIDEOFLUOROSCOPIC SWALLOWING STUDY    Admission Date: 9/28/2024  Evaluation Date: 10/15/24  Radiologist:  Dr. Bolden    PLAN: Rec non-oral means of nutrition/hydration; to be determined by MD. No skilled swallowing intervention secondary to poor prognosis for swallowing improvement d/t severity of dysphagia and frequency of aspiration along with structural component contributing to dysphagia.       RECOMMENDATIONS   Diet Recommendations - Solids: NPO  Diet Recommendations - Liquids: NPO    Further Follow-up:  Follow Up Needed (Documentation Required): No  SLP Follow-up Date: 10/15/24  Compensatory Strategies Recommended:  (N/A)  Aspiration Precautions:  (N/A)  Medication Administration Recommendations: Non-oral  Treatment Plan/Recommendations: No further inpatient SLP service warranted    HISTORY   Background/Objective Information:    Problem List  Principal Problem:    Closed displaced midcervical fracture of left femur with delayed healing  Active Problems:    ESRD (end stage renal disease) (HCC)    Closed fracture of left hip (HCC)    Hyperphosphatemia    Respiratory failure (HCC)    Anemia      Past Medical History  Past Medical History:    Anemia    Asthma (HCC)    Back problem    BPH (benign prostatic hyperplasia)    Calculus of kidney    Cataract    Coronary atherosclerosis    Diabetes (HCC)    ESRD (end stage renal disease) on dialysis (HCC)    Essential hypertension    High blood pressure    High cholesterol    History of blood transfusion    Hyperlipidemia    Neuropathy    hands and feet    KRAIG on CPAP    Renal disorder    Sleep apnea    Visual impairment    glasses    Vocal cord paralysis, unilateral partial       Current Diet Consistency: Mechanical soft chopped/ Soft & Bite Sized;Honey thick liquids/ Moderately thick  Prior Level of Function: Assistance/Support for ADL's  Prior Living Situation: Home with spouse  History of Recent: Difficulty breathing  Precautions: Aspiration  Imaging results:      CXR 10/12/24:  CONCLUSION: No acute cardiopulmonary abnormality.         Reason for Referral:  (s/p extubation)    Family/Patient Goals: to eat    ASSESSMENT   DYSPHAGIA ASSESSMENT  Test completed in conjunction with Radiologist.  Patient Positioned: Upright;Midline.  Patient Viewed: Lateral.  Patient Alertness: Fully alert.  Consistencies Presented: Thin liquids;Nectar thick liquids/ Mildly thick;Honey thick liquids/ Moderately thick to assess oropharyngeal swallow function and assess for compensatory strategies to improve safe swallow function.    THIN LIQUIDS  Method of Presentation: Cup (controlled SMALL sip)  Oral Phase of Swallow (VFSS - Thin Liquids): Within Functional Limits  Triggered at: Valleculae;Pyriform sinuses  Premature Spillage to: Valleculae;Pyriform sinuses  Residue Severity, Location: Mild/Mod;Valleculae;Pyriform sinuses  Cleared/Reduced with:  (unable to full clear)  Laryngeal Penetration: During the swallow;After the swallow  Tracheal Aspiration: During the swallow;After the swallow  Cough/Throat Clear Response: Yes  Cough/Throat Clear Effective: No  Strategy(ies) Implemented (Thin Liquids): Multiple swallows (controlled amts)  Effectiveness: No  NECTAR THICK LIQUIDS/ MILDLY THICK  Method of Presentation: Teaspoon  Oral Phase of Swallow (VFSS - Nectar Thick Liquids): Within Functional Limits  Triggered at: Valleculae;Pyriform sinuses  Premature Spillage to: Valleculae;Pyriform sinuses  Residue Severity, Location: Mod/Severe;Valleculae;Pyriform Sinuses  Cleared/Reduced with: Multiple swallows;Attempted however ineffective  Laryngeal Penetration: During the swallow;After the swallow  Tracheal Aspiration: During the swallow;After the swallow  Cough/Throat Clear Response: Yes  Cough/Throat Clear Effective: No  Strategy(ies) Implemented (Nectar Thick): Liquids via spoon;Multiple swallows  Effectiveness: No  HONEY THICK LIQUIDS/ MODERATELY THICK   Method of Presentation: Teaspoon  Oral Phase of  Swallow (VFSS - Honey Thick Liquids): Within Functional Limits  Triggered at: Valleculae;Pyriform sinuses  Premature Spillage to: Valleculae;Pyriform sinuses  Residue Severity, Location: Valleculae;Pyriform sinuses;Mod/Severe  Cleared/Reduced with: Attempted however ineffective;Multiple swallows  Laryngeal Penetration: During the swallow;After the swallow  Tracheal Aspiration: During the swallow;After the swallow  Cough/Throat Clear Response: Yes  Cough/Throat Clear Effective: No  Strategy(ies) Implemented (Honey Thick Liquids): Liquids via spoon;Multiple swallows  Effectiveness: No    IMPRESSIONS:  Pt presents with severe oropharyngeal dysphagia. Functional oral skills on the controlled liquid trials.  ongue-base retraction reduced all consistencies with premature spillage of bolus into valleculae and pyriform sinus. Hyolarygneal excursion and epiglottic inversion/retroversion significantly reduced all trials. Pt with aspiration DURING and AFTER swallows all liquids. Pt unable to protect airway during the swallow- efforts to clear the moderate-severe pharyngeal retention with multiple swallows resulted into additional spillage into airway. Weak cough did not clear any of the aspirated material. No pureed trials presented; deemed unsafe given aspiration on the three controlled liquid trials and pharyngeal retention accumulating as exam progressed. UES appeared impaired with evidence of retrograde bolus flow. Collaborated with RN regarding Pt's swallowing plan of care. Pt with expectoration intermittently of barium phlegm post exam. Rec DOWNGRADE to NPO status/non-oral means of nutrition/hydration.        Per radiologist, Dr. Bolden, Pt with diffuse DISH; prominent proliferative changes fusing the spine over 4 or more vertebral levels. Per SLP, this structural change impeded flow of bolus and contributed to pharyngeal residue and this reducing Pt's ability to to protect airway.     Additional treatment time spent  reviewing each VFSS image on Richar in radiology suite with Pt. Provided with thorough education regarding recommendation for NPO status. Pt able to view evidence of aspiration through Richar images. Pt v/u.     FCM Impression: Abnormal     EDUCATION/INSTRUCTION  Reviewed results and recommendations with patient/family/caregiver.  Agreement/Understanding verbalized and all questions answered to their apparent satisfaction.    INTERDISCIPLINARY COMMUNICATION  Reviewed results with Radiologist; agreement verbalized.    Patient Experiencing Pain: No    FOLLOW UP  Treatment Plan/Recommendations: No further inpatient SLP service warranted  Number of Visits to Meet Established Goals: 0    Thank you for your referral.   If you have any questions, please contact   Celeste Plaza M.S. CCC/SLP  Speech-Language Pathologist  [facility]  #[phone]

## 2024-10-15 NOTE — PROGRESS NOTES
[facility]  part of Merged with [facility]    Progress Note    Ollie Hernández Patient Status:  Inpatient    1945 MRN [id number]   Location [facility] 2W/SW Attending Pranay Michel MD   Hosp Day # 17 PCP Wili Parmar MD     Chief Complaint:   Chief Complaint   Patient presents with    Fall       Subjective:   Ollie Parmarlious   Status post left hip arthroplasty on 10/4/2024.      Patient successfully extubated 10/6/2024.    Now on room air  Working well with PT.   Family at bedside.       Objective:   Objective:    Blood pressure 104/46, pulse (!) 124, temperature 98.1 °F (36.7 °C), temperature source Temporal, resp. rate 18, height 5' 4.02\" (1.626 m), weight 144 lb 10 oz (65.6 kg), SpO2 98%.    Physical Exam:    General: Alert comfortable in no acute distress on 2 L nasal cannula  HEENT: Normocephalic, anicteric sclera   Neck: supple  Cardiac: Regular rate and rhythm. S1, S2 normal. No murmurs thrills or rubs  Lungs: Coarse breath sounds bilat  Abdomen: Soft, non-distended  Extremities: Without clubbing, cyanosis or edema.   Skin: Warm and dry.       Results:   Results:    Labs:  Recent Labs   Lab 10/10/24  0345 10/11/24  0404 10/12/24  0332 10/13/24  0442 10/14/24  0427   WBC 20.0* 18.6* 20.5* 19.3* 19.1*   HGB 9.6* 7.9* 8.1* 8.5* 8.5*   MCV 92.4 94.2 96.3 93.6 96.1   .0 437.0 448.0 452.0* 428.0       Recent Labs   Lab 10/09/24  0643 10/10/24  0345 10/11/24  0404 10/12/24  0332 10/13/24  0442 10/14/24  0427   * 189* 234* 190* 198* 183*   BUN 87* 43* 86* 48* 80* 112*   CREATSERUM 7.07* 4.59* 6.95* 4.23* 6.45* 8.16*   CA 9.9 9.9 9.9 10.3 10.3 10.1   ALB 3.6 3.9 3.5  --   --   --    * 141 145 142 141 140   K 4.7 4.8 4.6 4.5 4.9 4.7   CL 95* 100 103 104 103 103   CO2 25.0 27.0 31.0 27.0 25.0 24.0   ALKPHO 132* 130* 115  --   --   --    AST 53* 43* 29  --   --   --    ALT <7* <7* <7*  --   --   --    BILT 0.3 0.3 0.3  --   --   --    TP 6.8 7.3 6.7  --   --   --         Estimated Creatinine Clearance: 6.3 mL/min (A) (based on SCr of 8.16 mg/dL (H)).    No results for input(s): \"PTP\", \"INR\" in the last 168 hours.         Culture:  Hospital Encounter on 09/28/24   1. Blood Culture     Status: None    Collection Time: 09/30/24 10:03 AM    Specimen: Bld,Arterial Line; Blood   Result Value Ref Range    Blood Culture Result No Growth 5 Days N/A       Cardiac  No results for input(s): \"TROP\", \"PBNP\" in the last 168 hours.      Imaging: Imaging data reviewed in Epic.  No results found.    Medications:    lansoprazole  30 mg Oral BID AC    ipratropium-albuterol  3 mL Nebulization 2 times daily    insulin degludec  10 Units Subcutaneous Daily    insulin aspart  3 Units Subcutaneous TID CC    clopidogrel  75 mg Oral Daily    carvedilol  25 mg Oral BID    sevelamer carbonate  800 mg Oral TID CC    insulin aspart  1-7 Units Subcutaneous TID CC and HS    atorvastatin  40 mg Oral Nightly    epoetin donna  5,000 Units Subcutaneous Once per day on Monday Wednesday Friday    heparin  5,000 Units Subcutaneous Q8H STEPHANIE    senna  10 mL Oral BID    docusate  100 mg Oral BID    mineral oil-white petrolatum  1 Application Both Eyes Nightly    lidocaine-menthol  2 patch Transdermal Daily    cetirizine  5 mg Per NG Tube Daily    fluticasone-salmeterol  1 puff Inhalation BID         Assessment and Plan:   Assessment & Plan:      Acute hypoxic respiratory failure   Hypotension   Aspiration PNA  Pulmonary and cardiology on consult.   Secondary to pulmonary edema and aspiration  Intubated 9/29- extubated on 10/6/2024  Empiric zosyn.  Completed 5 days  Sputum cx normal julio c. Blood cx x 4 NGTD  Volume management per nephrology   CXR pulmonary edema. Improved alveolar opacities.   10/6: extubated  10/8: now on 2L O2NC, repeat CXR, SLP, PT , OT  10/9- now struggling to breathe. Will be transferred to the ICU. Needs emergent dialysis. Discussed with Pulm and nursing   10/10- feeling well monitor in the icu one  more day    10/11- can be transferred to the floor. Tolerating diet. On room air.   10/12- will hopefully be discharged to rehab Monday   10/13- discharge to rehab. Tyelnol for pain control.   10/14- speech eval tomorrow.         ESRD   HD per nephrology [facility]  Nephro on consult.   10/7: s/p HD   10/11- dialysis tomorrow   10/12- dialysis today 3 liters to be removed today   On epogen and IV protonix         Hip fracture   S/p fall PTA -status post left hip arthroplasty on 10/4/2024  Orthopedic surgery on consult.   High risk for surgery  however surgery is necessary.   Pain control     Leukocytosis                - Most likely reactive or from steroids                - no signs or symptoms of infection- monitor CBC daily      DM II   A1c   ISS     Paroxysmal A.fib - currently NSR   Acute on chronic HFpEF  CAD s/p jodi circ 5/24'   Elevate troponin secondary to demand ischemia   S/p watchman device 9/11/24  Cont coreg, statin  No further cardiac testing prior to planned surgery.   Resume ASA/Plavix once safely able to do so.     Coffee ground emesis   Resolved.   IV protonix BID   Hb stable. Resume plavix. Resume aspirin if Hb remains stable.       >55min spent, >50% spent counseling and coordinating care in the form of educating pt/family and d/w consultants and staff. Most of the time spent discussing the above plan.        Plan of care discussed with patient or family at bedside.    Ne Irizarry MD          Supplementary Documentation:     Quality:  DVT Prophylaxis: heparin   CODE status: Full  Dispo: per clinical course            Estimated date of discharge: TBD  Discharge is dependent on: clinical stability  At this point Mr. Hernández is expected to be discharge to: TBD        Ne Irizarry MD

## 2024-10-15 NOTE — H&P (VIEW-ONLY)
REFERRING PHYSICIAN: Dr. Pearson ref. provider found    HPI:         Thank you very much for requesting me to see the patient. REASON for consultation: consideration for PEG tube placement.     As you know, Ollie Hernández is a 79 year old male with DM, ESRD (HD on MWF) who admitted 9/28/2024 with left femoral neck fracture after a fall. Was transferred to ICU 9/29/2024 due to severe HTN and SOB  -- Acute hypoxic respiratory failure.  10/04/2024 underwent Left total hip arthroplasty.  Intubated 9/29- extubated on 10/6/2024. Completed 5 days of empiric zosyn / aspiration pneumonia. Patient completed speech evaluation / VFSS  and noted to have severe oropharyngeal dysphagia.          PMH: anemia; asthma; back problem; kidney stone; cataract; DM; ESRD on dialysis; HTN; dyslipidemia; neuropathy, B hands / feet; KRAIG / CPAP; unilateral vocal cord paralysis; paroxysmal a-fib, Acute on chronic HFpEF; watchman device 9/11/24;     PMH-GI:      8/9/2024 -- consult Dr. Bustamante -- \"Ollie was admitted 6/2024 and again 2 weeks ago with similar presentation of maroon colored stools.  EGD/Colonoscopy 6/2024 with unremarkable EGD, Colonoscopy with bleeding AVM vs Dieulafoy in the Hepatic Flexure s/p APC.  Had repeat EGD/Colonoscopy 7/31/24 with again normal EGD and Colonoscopy with evidence of the previously placed clip in the hepatic flexure region.  Mucosa that it was attached to was erythematous, however no bleeding was noted.  This area was APC'd.  Discussed at that time that patient would be considered for Watchman given recurrent bleeding.  Last video capsule endoscopy 2016 for evaluation of CHELSEY.  Presents with Hgb 5.3 and on last discharge 10.5.  CT Angio negative for active bleeding.  INR 1.45.   BUN 33 and Cr 3.16.   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  - - - Impression: Rectal Bleeding; Hx Colon AVM: Afib on  Eliquis: - Patient presents back with similar to prior admissions with rectal bleeding of maroon colored stools, lack of abdominal pain, nausea, emesis, weight loss. - - - We discussed options of repeat Colonoscopy given known bleed in the hepatic flexure but low yield given repeat Colonoscopy and further treatment.  Patient and wife also defer repeat Colonoscopy.  EGD not recommended given negative exam x 2.     Plan: - Will proceed with VCE tomorrow to evaluate for small bowel etiology of anemia. - Ideally needs to be considered for Watchman Procedure and eventual discontinuation of anticoagulation. - Transfuse to goal Hgb > 8.0 given cardiac history. - -\"      8/10/24 -- Capsule endoscopy -- \"Indication: GI bleeding of obscure origin. - -Findings:  - Capsule retention in the stomach for the majority of the exam.  - - - No evidence of old or fresh blood in the in limited views of the duodenum.  Impression:   Incomplete exam  No active bleeding on limited exam.  Recommendation:  1. KUB to confirm capsule passage to cecum 2. Resume Eliquis however agree to discuss with Cardiology regarding decreasing dosage.\"     SOC: wife = Stephy Hernández     Fhx: NC            Past Surgical History:   Procedure Laterality Date    Appendectomy      1981    Back surgery      Neck/back - 1998    Capsule endoscopy - internal referral      Cataract      12/2021 and 1/2022    Cath bare metal stent (bms)      Colonoscopy      Colonoscopy N/A 01/25/2021    Procedure: COLONOSCOPY;  Surgeon: Michael Gautam MD;  Location: [facility] ENDO    Colonoscopy N/A 06/03/2024    Procedure: COLONOSCOPY;  Surgeon: Gabriel Saldana MD;  Location: [facility] ENDOSCOPY    Colonoscopy N/A 06/15/2024    Procedure: COLONOSCOPY;  Surgeon: Carlyn Storey MD;  Location: [facility] ENDOSCOPY    Colonoscopy N/A 7/31/2024    Procedure: COLONOSCOPY;  Surgeon: Gabriel Saldana MD;  Location: [facility] ENDOSCOPY    Hand/finger surgery unlisted      Accidental trauma    Spine surgery  procedure unlisted      Upper gi endoscopy,diagnosis       Social History     Socioeconomic History    Marital status:    Tobacco Use    Smoking status: Former     Current packs/day: 0.00     Average packs/day: 1 pack/day for 17.0 years (17.0 ttl pk-yrs)     Types: Cigarettes     Start date: 1964     Quit date: 1981     Years since quittin.8    Smokeless tobacco: Never   Vaping Use    Vaping status: Never Used   Substance and Sexual Activity    Alcohol use: No     Alcohol/week: 0.0 standard drinks of alcohol    Drug use: No    Sexual activity: Yes     Partners: Female     Social Drivers of Health     Financial Resource Strain: Low Risk  (2024)    Received from [facility]    Financial Resource Strain     In the past year, have you or any family members you live with been unable to get any of the following when it was really needed? Check all that apply.: None   Food Insecurity: No Food Insecurity (2024)    Food Insecurity     Food Insecurity: Never true   Transportation Needs: No Transportation Needs (2024)    Transportation Needs     Lack of Transportation: No   Recent Concern: Transportation Needs - At Risk (2024)    Received from [facility]    Transportation Needs     In the past 12 months, has lack of reliable transportation kept you from medical appointments, meetings, work or from getting things needed for daily living? : Yes   Social Connections: Low Risk  (2024)    Received from [facility]    Social Connections     How often do you see or talk to people that you care about and feel close to? (For example: talking to friends on the phone, visiting friends or family, going to Zoroastrian or club meetings): 5 or more times a week   Housing Stability: Low Risk  (2024)    Housing Stability     Housing Instability: No         Current Facility-Administered Medications   Medication Dose Route Frequency    lansoprazole (Prevacid Solutab)  disintegrating tab 30 mg  30 mg Oral BID AC    ipratropium-albuterol (Duoneb) 0.5-2.5 (3) MG/3ML inhalation solution 3 mL  3 mL Nebulization 2 times daily    ipratropium-albuterol (Duoneb) 0.5-2.5 (3) MG/3ML inhalation solution 3 mL  3 mL Nebulization Q6H PRN    insulin degludec (Tresiba) 100 units/mL flextouch 10 Units  10 Units Subcutaneous Daily    insulin aspart (NovoLOG) 100 Units/mL FlexPen 3 Units  3 Units Subcutaneous TID CC    influenza virus trivalent high dose PF (Fluzone HD) 0.5 mL injection (ages >/= 65 years) 0.5 mL  0.5 mL Intramuscular Prior to discharge    clopidogrel (Plavix) tab 75 mg  75 mg Oral Daily    carvedilol (Coreg) tab 25 mg  25 mg Oral BID    sevelamer carbonate (Renvela) tab 800 mg  800 mg Oral TID CC    insulin aspart (NovoLOG) 100 Units/mL FlexPen 1-7 Units  1-7 Units Subcutaneous TID CC and HS    atorvastatin (Lipitor) tab 40 mg  40 mg Oral Nightly    HYDROmorphone (Dilaudid) 1 MG/ML injection 0.1 mg  0.1 mg Intravenous Q2H PRN    Or    HYDROmorphone (Dilaudid) 1 MG/ML injection 0.2 mg  0.2 mg Intravenous Q2H PRN    Or    HYDROmorphone (Dilaudid) 1 MG/ML injection 0.4 mg  0.4 mg Intravenous Q2H PRN    epoetin donna (Epogen, Procrit) 5,000 Units injection  5,000 Units Subcutaneous Once per day on Monday Wednesday Friday    metoclopramide (Reglan) 5 mg/mL injection 10 mg  10 mg Intravenous Q24H PRN    sodium chloride 0.9 % irrigation    Continuous PRN    sodium chloride 0.9 % irrigation    Continuous PRN    ondansetron (Zofran) 4 MG/2ML injection 4 mg  4 mg Intravenous Q6H PRN    diphenhydrAMINE (Benadryl) cap/tab 25 mg  25 mg Oral Q4H PRN    Or    diphenhydrAMINE (Benadryl) 50 mg/mL  injection 12.5 mg  12.5 mg Intravenous Q4H PRN    heparin (Porcine) 5000 UNIT/ML injection 5,000 Units  5,000 Units Subcutaneous Q8H STEPHANIE    dextrose 10% infusion (TPN no rate)   Intravenous Continuous PRN    pancrelipase (Lip-Prot-Amyl) (Zenpep) DR particles cap 10,000 Units  10,000 Units Per G Tube PRN     And    sodium bicarbonate tab 325 mg  325 mg Oral PRN    senna (Senokot) 8.8 MG/5ML oral syrup 17.6 mg  10 mL Oral BID    docusate (Colace) 50 MG/5ML oral solution 100 mg  100 mg Oral BID    mineral oil-white petrolatum (Artificial Tears) 83-15 % ophthalmic ointment 1 Application  1 Application Both Eyes Nightly    lidocaine-menthol 4-1 % patch 2 patch  2 patch Transdermal Daily    hydrALAzine (Apresoline) 20 mg/mL injection 10 mg  10 mg Intravenous Q6H PRN    labetalol (Trandate) 5 mg/mL injection 10 mg  10 mg Intravenous Q4H PRN    cetirizine (ZyrTEC) tab 5 mg  5 mg Per NG Tube Daily    acetaminophen (Tylenol) 160 MG/5ML oral liquid 500 mg  500 mg Per NG Tube Daily PRN    acetaminophen (Tylenol) tab 650 mg  650 mg Oral Q4H PRN    Or    acetaminophen (Tylenol) 160 MG/5ML oral liquid 650 mg  650 mg Per NG Tube Q4H PRN    Or    acetaminophen (Tylenol) rectal suppository 650 mg  650 mg Rectal Q4H PRN    polyethylene glycol (PEG 3350) (Miralax) 17 g oral packet 17 g  17 g Per NG Tube Daily PRN    heparin (Porcine) 1000 UNIT/ML injection 1,500 Units  1.5 mL Intravenous PRN Dialysis    lidocaine-prilocaine (Emla) 2.5-2.5 % cream   Topical PRN    bisacodyl (Dulcolax) 10 MG rectal suppository 10 mg  10 mg Rectal Daily PRN    albuterol (Ventolin HFA) 108 (90 Base) MCG/ACT inhaler 2 puff  2 puff Inhalation Q4H PRN    fluticasone propionate (Flonase) 50 MCG/ACT nasal suspension 2 spray  2 spray Nasal Daily PRN    glucose (Dex4) 15 GM/59ML oral liquid 15 g  15 g Oral Q15 Min PRN    Or    glucose (Glutose) 40% oral gel 15 g  15 g Oral Q15 Min PRN    Or    glucose-vitamin C (Dex-4) chewable tab 4 tablet  4 tablet Oral Q15 Min PRN    Or    dextrose 50% injection 50 mL  50 mL Intravenous Q15 Min PRN    Or    glucose (Dex4) 15 GM/59ML oral liquid 30 g  30 g Oral Q15 Min PRN    Or    glucose (Glutose) 40% oral gel 30 g  30 g Oral Q15 Min PRN    Or    glucose-vitamin C (Dex-4) chewable tab 8 tablet  8 tablet Oral Q15 Min PRN     fluticasone-salmeterol (Advair Diskus) 250-50 MCG/ACT inhaler 1 puff  1 puff Inhalation BID       Allergies[1]    A comprehensive 10 point review of systems was completed.  Pertinent positives and negatives noted in the the HPI.    EXAM:  /46 (BP Location: Radial)   Pulse (!) 124   Temp 98.1 °F (36.7 °C) (Temporal)   Resp 18   Ht 5' 4.02\" (1.626 m)   Wt 144 lb 10 oz (65.6 kg)   SpO2 98%   BMI 24.81 kg/m²  -Body mass index is 24.81 kg/m².  GENERAL: well developed, well nourished, in no apparent distress  SKIN: no rashes, no suspicious lesions  HEENT: anicteric; no JVD.  NECK: supple, no adenopathy, no bruits  LUNGS: clear to auscultation  CARDIO: RRR, nl s1 and s2. No murmers appreciated.  GI: Soft, NT, ND, normal BS. NO HSM, masses, hernias or bruits.  EXTREMITIES: no cyanosis, clubbing or edema    ___________________________________________________________    ASSESSMENT: In summary this is a 79 year old male admitted  who was admitted 9/28/2024 secondary to left femoral neck fracture after a fall. Was transferred to ICU 9/29/2024 due to severe HTN and SOB  -- Acute hypoxic respiratory failure.  10/04/2024 underwent Left total hip arthroplasty.  Intubated 9/29- extubated on 10/6/2024. Completed 5 days of empiric zosyn / aspiration pneumonia. Patient completed speech evaluation / VFSS  and noted to have severe oropharyngeal dysphagia. h/o  DM, ESRD (HD on MWF) who admitted 9/28/2024 with left femoral neck fracture after a fall. Was transferred to ICU 9/29/2024 due to severe HTN and SOB  -- Acute hypoxic respiratory failure.  10/04/2024 underwent Left total hip arthroplasty.  Intubated 9/29- extubated on 10/6/2024. Completed 5 days of empiric zosyn / aspiration pneumonia. Patient completed speech evaluation / VFSS  and noted to have severe oropharyngeal dysphagia.     ACTIVE ISSUES INCLUDE:     Sever oropharyngeal dysphagia -- tentatively plan for EGD / PEG 10/19/2024 -- d/w pt and pt's spouse at bedside.  I / R discussed with patient in detail. Hold Plavix.     2. Left femur fx. S/p ORIF.     3. ESRD / HD     4. DM           The patient indicates understanding of these issues and agrees to the plan. Thank you!       Yash Sheppard M.D.       [facility]  ___________________________________________________________               [1]   Allergies  Allergen Reactions    Adhesive Tape OTHER (SEE COMMENTS)     Severe rashes    Dust Mite Extract RASH

## 2024-10-15 NOTE — PROGRESS NOTES
[facility]  part of [facility]    Progress Note    Ollie Hernández Patient Status:  Inpatient    1945 MRN [id number]   Location [facility] 2W/SW Attending Pranay Michel MD   Hosp Day # 17 PCP Wili Parmar MD     Chief Complaint:   Chief Complaint   Patient presents with    Fall       Subjective:   Ollie Moyous   Status post left hip arthroplasty on 10/4/2024.      Patient successfully extubated 10/6/2024.    Now on room air  Working well with PT.   Patient came back from his swallow eval. He needs to remain NPO. Wife at bedside.       Objective:   Objective:    Blood pressure 104/46, pulse (!) 124, temperature 98.1 °F (36.7 °C), temperature source Temporal, resp. rate 18, height 5' 4.02\" (1.626 m), weight 144 lb 10 oz (65.6 kg), SpO2 98%.    Physical Exam:    General: Alert comfortable in no acute distress on 2 L nasal cannula  HEENT: Normocephalic, anicteric sclera   Neck: supple  Cardiac: Regular rate and rhythm. S1, S2 normal. No murmurs thrills or rubs  Lungs: Coarse breath sounds bilat  Abdomen: Soft, non-distended  Extremities: Without clubbing, cyanosis or edema.   Skin: Warm and dry.       Results:   Results:    Labs:  Recent Labs   Lab 10/10/24  0345 10/11/24  0404 10/12/24  0332 10/13/24  0442 10/14/24  0427   WBC 20.0* 18.6* 20.5* 19.3* 19.1*   HGB 9.6* 7.9* 8.1* 8.5* 8.5*   MCV 92.4 94.2 96.3 93.6 96.1   .0 437.0 448.0 452.0* 428.0       Recent Labs   Lab 10/09/24  0643 10/10/24  0345 10/11/24  0404 10/12/24  0332 10/13/24  0442 10/14/24  0427   * 189* 234* 190* 198* 183*   BUN 87* 43* 86* 48* 80* 112*   CREATSERUM 7.07* 4.59* 6.95* 4.23* 6.45* 8.16*   CA 9.9 9.9 9.9 10.3 10.3 10.1   ALB 3.6 3.9 3.5  --   --   --    * 141 145 142 141 140   K 4.7 4.8 4.6 4.5 4.9 4.7   CL 95* 100 103 104 103 103   CO2 25.0 27.0 31.0 27.0 25.0 24.0   ALKPHO 132* 130* 115  --   --   --    AST 53* 43* 29  --   --   --    ALT <7* <7* <7*  --   --   --    BILT  0.3 0.3 0.3  --   --   --    TP 6.8 7.3 6.7  --   --   --        Estimated Creatinine Clearance: 6.3 mL/min (A) (based on SCr of 8.16 mg/dL (H)).    No results for input(s): \"PTP\", \"INR\" in the last 168 hours.         Culture:  Hospital Encounter on 09/28/24   1. Blood Culture     Status: None    Collection Time: 09/30/24 10:03 AM    Specimen: Bld,Arterial Line; Blood   Result Value Ref Range    Blood Culture Result No Growth 5 Days N/A       Cardiac  No results for input(s): \"TROP\", \"PBNP\" in the last 168 hours.      Imaging: Imaging data reviewed in Epic.  XR VIDEO SWALLOW (CPT=74230)    Result Date: 10/15/2024  CONCLUSION:  1. Recurrent large volume aspiration of various barium preparations with cough response.    Dictated by (CST): Juan Bolden MD on 10/15/2024 at 12:50 PM     Finalized by (CST): Juan Bolden MD on 10/15/2024 at 12:55 PM           Medications:    lansoprazole  30 mg Oral BID AC    ipratropium-albuterol  3 mL Nebulization 2 times daily    insulin degludec  10 Units Subcutaneous Daily    insulin aspart  3 Units Subcutaneous TID CC    clopidogrel  75 mg Oral Daily    carvedilol  25 mg Oral BID    sevelamer carbonate  800 mg Oral TID CC    insulin aspart  1-7 Units Subcutaneous TID CC and HS    atorvastatin  40 mg Oral Nightly    epoetin donna  5,000 Units Subcutaneous Once per day on Monday Wednesday Friday    heparin  5,000 Units Subcutaneous Q8H STEPHANIE    senna  10 mL Oral BID    docusate  100 mg Oral BID    mineral oil-white petrolatum  1 Application Both Eyes Nightly    lidocaine-menthol  2 patch Transdermal Daily    cetirizine  5 mg Per NG Tube Daily    fluticasone-salmeterol  1 puff Inhalation BID         Assessment and Plan:   Assessment & Plan:      Acute hypoxic respiratory failure   Hypotension   Aspiration PNA  Pulmonary and cardiology on consult.   Secondary to pulmonary edema and aspiration  Intubated 9/29- extubated on 10/6/2024  Empiric zosyn.  Completed 5 days  Sputum cx  normal julio c. Blood cx x 4 NGTD  Volume management per nephrology   CXR pulmonary edema. Improved alveolar opacities.   10/6: extubated  10/8: now on 2L O2NC, repeat CXR, SLP, PT , OT  10/9- now struggling to breathe. Will be transferred to the ICU. Needs emergent dialysis. Discussed with Pulm and nursing   10/10- feeling well monitor in the icu one more day    10/11- can be transferred to the floor. Tolerating diet. On room air.   10/12- will hopefully be discharged to rehab Monday   10/13- discharge to rehab. Tyelnol for pain control.   10/14- speech eval tomorrow.   10/15- Speech eval done- patient needs to remain NPO. GI consulted for possible Peg tube. Nutrition consult.         ESRD   HD per nephrology MW  Nephro on consult.   On epogen and IV protonix         Hip fracture   S/p fall PTA -status post left hip arthroplasty on 10/4/2024  Orthopedic surgery on consult.   High risk for surgery  however surgery is necessary.   Pain control     Leukocytosis                - Most likely reactive or from steroids                - no signs or symptoms of infection- monitor CBC daily      DM II   A1c   ISS     Paroxysmal A.fib - currently NSR   Acute on chronic HFpEF  CAD s/p jodi circ 5/24'   Elevate troponin secondary to demand ischemia   S/p watchman device 9/11/24  Cont coreg, statin  No further cardiac testing prior to planned surgery.   Resume ASA/Plavix once safely able to do so.     Coffee ground emesis   Resolved.   IV protonix BID   Hb stable. Resume plavix. Resume aspirin if Hb remains stable.       >55min spent, >50% spent counseling and coordinating care in the form of educating pt/family and d/w consultants and staff. Most of the time spent discussing the above plan.        Plan of care discussed with patient or family at bedside.    Ne Irizarry MD          Supplementary Documentation:     Quality:  DVT Prophylaxis: heparin   CODE status: Full  Dispo: per clinical course            Estimated date of  discharge: TBD  Discharge is dependent on: clinical stability  At this point Mr. Hernández is expected to be discharge to: TBD        Ne Irizarry MD

## 2024-10-15 NOTE — PHYSICAL THERAPY NOTE
PHYSICAL THERAPY TREATMENT NOTE - INPATIENT     Room Number: 415/415-A       Presenting Problem: fall, s/p L WENDY  Co-Morbidities : Anemia    Asthma ([facility])   Back problem   BPH (benign prostatic hyperplasia)   Calculus of kidney   Cataract   Coronary atherosclerosis   Diabetes (HCC)   ESRD (end stage renal disease) on dialysis ([facility])   Essential hypertension   High blood pressure   High cholesterol   History of blood transfusion   Hyperlipidemia   Neuropathy    hands and feet   KRAIG on CPAP   Renal disorder   Sleep apnea   Visual impairment    glasses   Vocal cord paralysis, unilateral partial    Problem List  Principal Problem:    Closed displaced midcervical fracture of left femur with delayed healing  Active Problems:    ESRD (end stage renal disease) ([facility])    Closed fracture of left hip ([facility])    Hyperphosphatemia    Respiratory failure ([facility])    Anemia      PHYSICAL THERAPY ASSESSMENT   Patient demonstrates good  progress this session, goals  progressing with 2/5 met this session.      Patient is requiring moderate assist as a result of the following impairments: decreased functional strength, impaired standing balance, impaired motor planning, and medical status.     Patient continues to function below baseline with bed mobility, transfers, and gait.  Next session anticipate patient to progress transfers and gait.  Physical Therapy will continue to follow patient for duration of hospitalization.    Patient continues to benefit from continued skilled PT services: to promote return to prior level of function and safety with continuous assistance and gradual rehabilitative therapy .    PLAN DURING HOSPITALIZATION  Nursing Mobility Recommendation :  (2A with gait belt and RW for SPT)     PT Treatment Plan: Bed mobility;Body mechanics;Coordination;Endurance;Energy conservation;Patient education;Family education;Gait training;Balance training;Transfer training;Stair training;Stoop training  Frequency (Obs): Daily      SUBJECTIVE  \"Give me a minute but I'll try walking again.\"    OBJECTIVE  Precautions: Limb alert - left;WENDY - anterior    WEIGHT BEARING RESTRICTION  L Lower Extremity: Weight Bearing as Tolerated      PAIN ASSESSMENT   Rating: 3  Location: LLE  Management Techniques: Body mechanics    BALANCE  Static Sitting: Good  Dynamic Sitting: Fair +  Static Standing: Fair  Dynamic Standing: Fair -    ACTIVITY TOLERANCE  Pulse: (!) 122 (at rest, 135 with activity)  Heart Rate Source: Monitor     BP: 132/70  BP Location: Left arm  BP Method: Automatic  Patient Position: Sitting     AM-PAC '6-Clicks' INPATIENT SHORT FORM - BASIC MOBILITY  How much difficulty does the patient currently have...  Patient Difficulty: Turning over in bed (including adjusting bedclothes, sheets and blankets)?: A Lot   Patient Difficulty: Sitting down on and standing up from a chair with arms (e.g., wheelchair, bedside commode, etc.): A Lot   Patient Difficulty: Moving from lying on back to sitting on the side of the bed?: A Lot   How much help from another person does the patient currently need...   Help from Another: Moving to and from a bed to a chair (including a wheelchair)?: A Lot   Help from Another: Need to walk in hospital room?: A Little   Help from Another: Climbing 3-5 steps with a railing?: A Lot     AM-PAC Score:  Raw Score: 13   Approx Degree of Impairment: 64.91%   Standardized Score (AM-PAC Scale): 36.74   CMS Modifier (G-Code): CL    FUNCTIONAL ABILITY STATUS  Functional Mobility/Gait Assessment  Gait Assistance: Minimum assistance;Moderate assistance  Distance (ft): 16+12  Assistive Device: Rolling walker  Pattern: L Decreased stance time (step-through pattern, slow pace, narrow MAY with cues to increase felicity width, initial modA progressing to Judy, assist for managing walker)  Supine to Sit: maximum assist - at BLEs, trunk  Sit to Stand: moderate assist and maximum assist - modA from edge of bed, maxA from bedside  chair    Skilled Therapy Provided: Since previous session patient has progressed, tolerating increased trials of gait. Pt progressing to Judy for managing walker with tactile cues for upright posture. Pt's wife present and supportive.      Patient received semi-fowlers in bed, agreeable to physical therapy. Vital signs monitored as noted above, patient experiencing tachycardia at rest, increasing with activity peaking at 135 . Anticipated therapy needs remain appropriate based on the patient's performance, personal factors, and remaining functional impairments.    PATIENT EDUCATION  Education Provided To: Patient;Family/Caregiver  Patient Education: Role of Physical Therapy;Plan of Care;Discharge Recommendations;Functional Transfer Techniques;Fall Prevention;Weight Bear Status;Posture/Positioning;Gait Training (post op precautions / B AP / R and L LAQ / QS and repeated sit to stand)  Patient's Response to Education: Verbalized Understanding;Returned Demonstration;Requires Further Education  Family/Caregiver Education: Role of Physical Therapy;Plan of Care;Discharge Recommendations (family training)  Family/Caregiver's Response to Education: Verbalized Understanding;Returned Demonstration    The patient's Approx Degree of Impairment: 64.91% has been calculated based on documentation in the [facility] '6 clicks' Inpatient Daily Activity Short Form.  Research supports that patients with this level of impairment may benefit from a rehab facility.  Final disposition will be made by interdisciplinary medical team.      Patient End of Session: Up in chair;Call light within reach;Needs met;RN aware of session/findings;All patient questions and concerns addressed;Family present    CURRENT GOALS   Goals to be met by: 10/22/24  Patient Goal Patient's self-stated goal is: to return to PLOF   Goal #1 Patient is able to demonstrate supine - sit EOB @ level: moderate assistance      Goal #1   Current Status NOT MET/IN PROGRESS    Goal  #2 Patient is able to demonstrate transfers EOB to/from Chair/Wheelchair at assistance level: moderate assistance with  least restrictive device      Goal #2  Current Status MET 10/15/24   Goal #3 Patient is able to ambulate >10 feet with assist device:  least restrictive device  at assistance level: moderate assistance   Goal #3   Current Status MET 10/15/24   Goal #4  Patient is able to ambulate 20 feet using rolling walker at assistance level: CGA   Goal #4   Current Status  NEW GOAL 10/15/24   Goal #5 Patient to demonstrate independence with home activity/exercise instructions provided to patient in preparation for discharge.   Goal #5   Current Status  IN PROGRESS/ONGOING    Goal #6     Goal #6  Current Status       Gait Trainin minutes      Daria Rivas, PT, DPT  [facility]  Rehab Services - Physical Therapy  [phone]

## 2024-10-16 LAB
ANION GAP SERPL CALC-SCNC: 11 MMOL/L (ref 0–18)
BUN BLD-MCNC: 97 MG/DL (ref 9–23)
BUN/CREAT SERPL: 11.1 (ref 10–20)
CALCIUM BLD-MCNC: 10.1 MG/DL (ref 8.7–10.4)
CHLORIDE SERPL-SCNC: 102 MMOL/L (ref 98–112)
CO2 SERPL-SCNC: 25 MMOL/L (ref 21–32)
CREAT BLD-MCNC: 8.7 MG/DL
DEPRECATED RDW RBC AUTO: 58.1 FL (ref 35.1–46.3)
EGFRCR SERPLBLD CKD-EPI 2021: 6 ML/MIN/1.73M2 (ref 60–?)
ERYTHROCYTE [DISTWIDTH] IN BLOOD BY AUTOMATED COUNT: 16.7 % (ref 11–15)
GLUCOSE BLD-MCNC: 122 MG/DL (ref 70–99)
GLUCOSE BLDC GLUCOMTR-MCNC: 109 MG/DL (ref 70–99)
GLUCOSE BLDC GLUCOMTR-MCNC: 116 MG/DL (ref 70–99)
GLUCOSE BLDC GLUCOMTR-MCNC: 131 MG/DL (ref 70–99)
GLUCOSE BLDC GLUCOMTR-MCNC: 140 MG/DL (ref 70–99)
GLUCOSE BLDC GLUCOMTR-MCNC: 157 MG/DL (ref 70–99)
GLUCOSE BLDC GLUCOMTR-MCNC: 213 MG/DL (ref 70–99)
GLUCOSE BLDC GLUCOMTR-MCNC: 65 MG/DL (ref 70–99)
GLUCOSE BLDC GLUCOMTR-MCNC: 87 MG/DL (ref 70–99)
HCT VFR BLD AUTO: 29.5 %
HGB BLD-MCNC: 9.4 G/DL
MAGNESIUM SERPL-MCNC: 2.4 MG/DL (ref 1.6–2.6)
MCH RBC QN AUTO: 30.2 PG (ref 26–34)
MCHC RBC AUTO-ENTMCNC: 31.9 G/DL (ref 31–37)
MCV RBC AUTO: 94.9 FL
OSMOLALITY SERPL CALC.SUM OF ELEC: 317 MOSM/KG (ref 275–295)
PHOSPHATE SERPL-MCNC: 7.6 MG/DL (ref 2.4–5.1)
PLATELET # BLD AUTO: 421 10(3)UL (ref 150–450)
POTASSIUM SERPL-SCNC: 4.7 MMOL/L (ref 3.5–5.1)
RBC # BLD AUTO: 3.11 X10(6)UL
SODIUM SERPL-SCNC: 138 MMOL/L (ref 136–145)
WBC # BLD AUTO: 15.6 X10(3) UL (ref 4–11)

## 2024-10-16 PROCEDURE — 99233 SBSQ HOSP IP/OBS HIGH 50: CPT | Performed by: HOSPITALIST

## 2024-10-16 PROCEDURE — 99233 SBSQ HOSP IP/OBS HIGH 50: CPT | Performed by: INTERNAL MEDICINE

## 2024-10-16 RX ORDER — DEXTROSE MONOHYDRATE AND SODIUM CHLORIDE 5; .45 G/100ML; G/100ML
INJECTION, SOLUTION INTRAVENOUS CONTINUOUS
Status: ACTIVE | OUTPATIENT
Start: 2024-10-16 | End: 2024-10-16

## 2024-10-16 RX ORDER — METOPROLOL TARTRATE 1 MG/ML
5 INJECTION, SOLUTION INTRAVENOUS ONCE
Status: COMPLETED | OUTPATIENT
Start: 2024-10-16 | End: 2024-10-16

## 2024-10-16 RX ORDER — ACETAMINOPHEN 10 MG/ML
1000 INJECTION, SOLUTION INTRAVENOUS EVERY 8 HOURS
Status: DISCONTINUED | OUTPATIENT
Start: 2024-10-16 | End: 2024-10-19

## 2024-10-16 NOTE — PROGRESS NOTES
[facility]  part of [facility]    Progress Note    Ollie Hernández Patient Status:  Inpatient    1945 MRN [id number]   Location [facility] 4W/SW/SE Attending Pranay Michel MD   Hosp Day # 18 PCP Wili Parmar MD     Chief Complaint:   Chief Complaint   Patient presents with    Fall       Subjective:   Ollie Hernández is c/o pain in his surgical hip tht is uncontrolled. He sleeps through our conversation but does wake up and c/o pain. Wife at bedside. She questions doing a repeat swallow test prior to placing G tube. He has been NPO. BG was low earlier. No SOB he is on RA.     Has had episodes of sinus tachycardia     Objective:   Objective:    Blood pressure 94/44, pulse 92, temperature 98.1 °F (36.7 °C), temperature source Temporal, resp. rate 18, height 5' 4.02\" (1.626 m), weight 144 lb 10 oz (65.6 kg), SpO2 95%.    Physical Exam:    General: No acute distress. Appears frail, ill   Respiratory: Clear to auscultation bilaterally. No wheezes. No rhonchi.  Cardiovascular: S1, S2. Regular rate and rhythm. No murmurs, rubs or gallops.   Abdomen: Soft, nontender, nondistended.  Positive bowel sounds. No rebound or guarding.  Neurologic: No focal neurological deficits.   Musculoskeletal: Moves all extremities.  Extremities: No edema.      Results:   Results:    Labs:  Recent Labs   Lab 10/11/24  0404 10/12/24  0332 10/13/24  0442 10/14/24  0427 10/16/24  042   WBC 18.6* 20.5* 19.3* 19.1* 15.6*   HGB 7.9* 8.1* 8.5* 8.5* 9.4*   MCV 94.2 96.3 93.6 96.1 94.9   .0 448.0 452.0* 428.0 421.0       Recent Labs   Lab 10/10/24  0345 10/11/24  0404 10/12/24  0332 10/14/24  0427 10/15/24  1222 10/16/24  042   * 234*   < > 183* 207* 122*   BUN 43* 86*   < > 112* 86* 97*   CREATSERUM 4.59* 6.95*   < > 8.16* 7.54* 8.70*   CA 9.9 9.9   < > 10.1 10.0 10.1   ALB 3.9 3.5  --   --   --   --     145   < > 140 139 138   K 4.8 4.6   < > 4.7 4.9 4.7    103   < > 103 101 102    CO2 27.0 31.0   < > 24.0 24.0 25.0   ALKPHO 130* 115  --   --   --   --    AST 43* 29  --   --   --   --    ALT <7* <7*  --   --   --   --    BILT 0.3 0.3  --   --   --   --    TP 7.3 6.7  --   --   --   --     < > = values in this interval not displayed.       Estimated Creatinine Clearance: 6 mL/min (A) (based on SCr of 8.7 mg/dL (H)).    No results for input(s): \"PTP\", \"INR\" in the last 168 hours.         Culture:  Hospital Encounter on 09/28/24   1. Blood Culture     Status: None    Collection Time: 09/30/24 10:03 AM    Specimen: Bld,Arterial Line; Blood   Result Value Ref Range    Blood Culture Result No Growth 5 Days N/A       Cardiac  No results for input(s): \"TROP\", \"PBNP\" in the last 168 hours.      Imaging: Imaging data reviewed in Epic.  XR VIDEO SWALLOW (CPT=74230)    Result Date: 10/15/2024  CONCLUSION:  1. Recurrent large volume aspiration of various barium preparations with cough response.    Dictated by (CST): Juan Bolden MD on 10/15/2024 at 12:50 PM     Finalized by (CST): Juan Bolden MD on 10/15/2024 at 12:55 PM           Medications:    lansoprazole  30 mg Oral BID AC    ipratropium-albuterol  3 mL Nebulization 2 times daily    insulin degludec  10 Units Subcutaneous Daily    insulin aspart  3 Units Subcutaneous TID CC    clopidogrel  75 mg Oral Daily    carvedilol  25 mg Oral BID    sevelamer carbonate  800 mg Oral TID CC    insulin aspart  1-7 Units Subcutaneous TID CC and HS    atorvastatin  40 mg Oral Nightly    epoetin donna  5,000 Units Subcutaneous Once per day on Monday Wednesday Friday    heparin  5,000 Units Subcutaneous Q8H STEPHANIE    senna  10 mL Oral BID    docusate  100 mg Oral BID    mineral oil-white petrolatum  1 Application Both Eyes Nightly    lidocaine-menthol  2 patch Transdermal Daily    cetirizine  5 mg Per NG Tube Daily    fluticasone-salmeterol  1 puff Inhalation BID         Assessment and Plan:   Assessment & Plan:        Acute hypoxemic respiratory failure -  resolved  Hypotension - resolved  Aspiration PNA - better   Improved.   Pulmonary and cards on consult.   On RA now was briefly intubated on mechanical ventilation 9/29-10/6.   Completed course of zosyn.   Sputum cx and blood cx neg.   Was in ICU has been transferred to floor.   SLP eval - Evidence of aspiration   NPO. Plans for PEG tube placement on Saturday (due to getting Plavix on 10/15) Plavix on hold   ESRD   Renal on cosnult.   HD M,W,F  Epogen, renvela   L hip fracture   S/p mechanical fall   Orthopedic surgery signed off   S/p L hip arthroplasty 10/4/24  Pain uncontrolled. Start IV tylenol. Cont PRN dilaudid once PEG tube in can start PO meds via G tube  XR L hip as pt continues with pain   PT/OT - [facility]   DM II   Stop scheduled insulin as NPO  ISS   Paroxsymal A.fib   Acute on chronic HFpEF - better   CAD   Elevated troponin secondary to demand ischemia   Cards on consult.   Coreg, statin , ASA. Plavix on hold due to PEG placement planned.   Needs better pain control as pt with episodes of sinus tachycardia.   Remote tele.   Coffee ground emesis - resolved.   IV PPI BID   Hemodynamically stable.   Plavix on hold   H/H stable.   Deconditioned   PT/OT - [facility]          >55min spent, >50% spent counseling and coordinating care in the form of educating pt/family and d/w consultants and staff. Most of the time spent discussing the above plan.        Plan of care discussed with patient or family at bedside.    Pranay Michel MD  Hospitalist          Supplementary Documentation:     Quality:  DVT Prophylaxis: SCD heparin on hold   CODE status: Full   Dispo: per clinical course            Estimated date of discharge: TBD  Discharge is dependent on: clinical stability  At this point Mr. Hernández is expected to be discharge to: [facility]

## 2024-10-16 NOTE — PLAN OF CARE
Patient AO x4. NPO. Aspiration precautions. Repositioned in bed as tolerated. On RA. Declined use of CPAP over night. Remote tele. Heparin and SCDs for DVT prophylaxis. Dilaudid for pain control. BG 65, hypoglycemia protocol followed. IVF started low rate. HD patient. Call light within reach, fall precautions. Plan ongoing.   Problem: Patient Centered Care  Goal: Patient preferences are identified and integrated in the patient's plan of care  Description: Interventions:  - What would you like us to know as we care for you? Home with family  - Provide timely, complete, and accurate information to patient/family  - Incorporate patient and family knowledge, values, beliefs, and cultural backgrounds into the planning and delivery of care  - Encourage patient/family to participate in care and decision-making at the level they choose  - Honor patient and family perspectives and choices  Outcome: Progressing     Problem: Diabetes/Glucose Control  Goal: Glucose maintained within prescribed range  Description: INTERVENTIONS:  - Monitor Blood Glucose as ordered  - Assess for signs and symptoms of hyperglycemia and hypoglycemia  - Administer ordered medications to maintain glucose within target range  - Assess barriers to adequate nutritional intake and initiate nutrition consult as needed  - Instruct patient on self management of diabetes  Outcome: Progressing     Problem: Patient/Family Goals  Goal: Patient/Family Long Term Goal  Description: Patient's Long Term Goal:     Interventions:  -   - See additional Care Plan goals for specific interventions  Outcome: Progressing  Goal: Patient/Family Short Term Goal  Description: Patient's Short Term Goal:     Interventions:   -   - See additional Care Plan goals for specific interventions  Outcome: Progressing     Problem: PAIN - ADULT  Goal: Verbalizes/displays adequate comfort level or patient's stated pain goal  Description: INTERVENTIONS:  - Encourage pt to monitor pain and  request assistance  - Assess pain using appropriate pain scale  - Administer analgesics based on type and severity of pain and evaluate response  - Implement non-pharmacological measures as appropriate and evaluate response  - Consider cultural and social influences on pain and pain management  - Manage/alleviate anxiety  - Utilize distraction and/or relaxation techniques  - Monitor for opioid side effects  - Notify MD/LIP if interventions unsuccessful or patient reports new pain  - Anticipate increased pain with activity and pre-medicate as appropriate  Outcome: Progressing     Problem: SAFETY ADULT - FALL  Goal: Free from fall injury  Description: INTERVENTIONS:  - Assess pt frequently for physical needs  - Identify cognitive and physical deficits and behaviors that affect risk of falls.  - [place] fall precautions as indicated by assessment.  - Educate pt/family on patient safety including physical limitations  - Instruct pt to call for assistance with activity based on assessment  - Modify environment to reduce risk of injury  - Provide assistive devices as appropriate  - Consider OT/PT consult to assist with strengthening/mobility  - Encourage toileting schedule  Outcome: Progressing     Problem: DISCHARGE PLANNING  Goal: Discharge to home or other facility with appropriate resources  Description: INTERVENTIONS:  - Identify barriers to discharge w/pt and caregiver  - Include patient/family/discharge partner in discharge planning  - Arrange for needed discharge resources and transportation as appropriate  - Identify discharge learning needs (meds, wound care, etc)  - Arrange for interpreters to assist at discharge as needed  - Consider post-discharge preferences of patient/family/discharge partner  - Complete POLST form as appropriate  - Assess patient's ability to be responsible for managing their own health  - Refer to Case Management Department for coordinating discharge planning if the patient needs  post-hospital services based on physician/LIP order or complex needs related to functional status, cognitive ability or social support system  Outcome: Progressing     Problem: CARDIOVASCULAR - ADULT  Goal: Maintains optimal cardiac output and hemodynamic stability  Description: INTERVENTIONS:  - Monitor vital signs, rhythm, and trends  - Monitor for bleeding, hypotension and signs of decreased cardiac output  - Evaluate effectiveness of vasoactive medications to optimize hemodynamic stability  - Monitor arterial and/or venous puncture sites for bleeding and/or hematoma  - Assess quality of pulses, skin color and temperature  - Assess for signs of decreased coronary artery perfusion - ex. Angina  - Evaluate fluid balance, assess for edema, trend weights  Outcome: Progressing  Goal: Absence of cardiac arrhythmias or at baseline  Description: INTERVENTIONS:  - Continuous cardiac monitoring, monitor vital signs, obtain 12 lead EKG if indicated  - Evaluate effectiveness of antiarrhythmic and heart rate control medications as ordered  - Initiate emergency measures for life threatening arrhythmias  - Monitor electrolytes and administer replacement therapy as ordered  Outcome: Progressing     Problem: RESPIRATORY - ADULT  Goal: Achieves optimal ventilation and oxygenation  Description: INTERVENTIONS:  - Assess for changes in respiratory status  - Assess for changes in mentation and behavior  - Position to facilitate oxygenation and minimize respiratory effort  - Oxygen supplementation based on oxygen saturation or ABGs  - Provide Smoking Cessation handout, if applicable  - Encourage broncho-pulmonary hygiene including cough, deep breathe, Incentive Spirometry  - Assess the need for suctioning and perform as needed  - Assess and instruct to report SOB or any respiratory difficulty  - Respiratory Therapy support as indicated  - Manage/alleviate anxiety  - Monitor for signs/symptoms of CO2 retention  Outcome: Progressing      Problem: GASTROINTESTINAL - ADULT  Goal: Maintains or returns to baseline bowel function  Description: INTERVENTIONS:  - Assess bowel function  - Maintain adequate hydration with IV or PO as ordered and tolerated  - Evaluate effectiveness of GI medications  - Encourage mobilization and activity  - Obtain nutritional consult as needed  - Establish a toileting routine/schedule  - Consider collaborating with pharmacy to review patient's medication profile  Outcome: Progressing

## 2024-10-16 NOTE — PLAN OF CARE
Problem: ALTERED NUTRIENT INTAKE - ADULT  Goal: Nutrient intake appropriate for improving, restoring or maintaining nutritional needs  Description: INTERVENTIONS:  - Assess nutritional status and recommend course of action  - Monitor  labs, and treatment plans  - Recommend appropriate  vitamin/mineral supplements  - monitor, and adjust PPN , future tube feedings based on assessed needs  - Provide specific nutrition education as appropriate  Outcome: Progressing

## 2024-10-16 NOTE — PLAN OF CARE
Ollie is A&Ox4. Pt ambulates with 2 assist and walker-he was able to getup in the chair today, monitoring blood glucose levels per order. Patient is NPO and PPN will start tonight. Dressing changed today with Betadine and silver mepilex. Frequent rounding by nursing staff. Safety precautions maintained/call light within reach.   Problem: Patient Centered Care  Goal: Patient preferences are identified and integrated in the patient's plan of care  Description: Interventions:  - What would you like us to know as we care for you?   - Provide timely, complete, and accurate information to patient/family  - Incorporate patient and family knowledge, values, beliefs, and cultural backgrounds into the planning and delivery of care  - Encourage patient/family to participate in care and decision-making at the level they choose  - Honor patient and family perspectives and choices  Outcome: Progressing     Problem: Diabetes/Glucose Control  Goal: Glucose maintained within prescribed range  Description: INTERVENTIONS:  - Monitor Blood Glucose as ordered  - Assess for signs and symptoms of hyperglycemia and hypoglycemia  - Administer ordered medications to maintain glucose within target range  - Assess barriers to adequate nutritional intake and initiate nutrition consult as needed  - Instruct patient on self management of diabetes  Outcome: Progressing     Problem: Patient/Family Goals  Goal: Patient/Family Long Term Goal  Description: Patient's Long Term Goal:     Interventions:  -   - See additional Care Plan goals for specific interventions  Outcome: Progressing  Goal: Patient/Family Short Term Goal  Description: Patient's Short Term Goal:     Interventions:   -   - See additional Care Plan goals for specific interventions  Outcome: Progressing     Problem: SAFETY ADULT - FALL  Goal: Free from fall injury  Description: INTERVENTIONS:  - Assess pt frequently for physical needs  - Identify cognitive and physical deficits and  behaviors that affect risk of falls.  - [place] fall precautions as indicated by assessment.  - Educate pt/family on patient safety including physical limitations  - Instruct pt to call for assistance with activity based on assessment  - Modify environment to reduce risk of injury  - Provide assistive devices as appropriate  - Consider OT/PT consult to assist with strengthening/mobility  - Encourage toileting schedule  Outcome: Progressing     Problem: DISCHARGE PLANNING  Goal: Discharge to home or other facility with appropriate resources  Description: INTERVENTIONS:  - Identify barriers to discharge w/pt and caregiver  - Include patient/family/discharge partner in discharge planning  - Arrange for needed discharge resources and transportation as appropriate  - Identify discharge learning needs (meds, wound care, etc)  - Arrange for interpreters to assist at discharge as needed  - Consider post-discharge preferences of patient/family/discharge partner  - Complete POLST form as appropriate  - Assess patient's ability to be responsible for managing their own health  - Refer to Case Management Department for coordinating discharge planning if the patient needs post-hospital services based on physician/LIP order or complex needs related to functional status, cognitive ability or social support system  Outcome: Progressing     Problem: CARDIOVASCULAR - ADULT  Goal: Maintains optimal cardiac output and hemodynamic stability  Description: INTERVENTIONS:  - Monitor vital signs, rhythm, and trends  - Monitor for bleeding, hypotension and signs of decreased cardiac output  - Evaluate effectiveness of vasoactive medications to optimize hemodynamic stability  - Monitor arterial and/or venous puncture sites for bleeding and/or hematoma  - Assess quality of pulses, skin color and temperature  - Assess for signs of decreased coronary artery perfusion - ex. Angina  - Evaluate fluid balance, assess for edema, trend  weights  Outcome: Progressing  Goal: Absence of cardiac arrhythmias or at baseline  Description: INTERVENTIONS:  - Continuous cardiac monitoring, monitor vital signs, obtain 12 lead EKG if indicated  - Evaluate effectiveness of antiarrhythmic and heart rate control medications as ordered  - Initiate emergency measures for life threatening arrhythmias  - Monitor electrolytes and administer replacement therapy as ordered  Outcome: Progressing     Problem: RESPIRATORY - ADULT  Goal: Achieves optimal ventilation and oxygenation  Description: INTERVENTIONS:  - Assess for changes in respiratory status  - Assess for changes in mentation and behavior  - Position to facilitate oxygenation and minimize respiratory effort  - Oxygen supplementation based on oxygen saturation or ABGs  - Provide Smoking Cessation handout, if applicable  - Encourage broncho-pulmonary hygiene including cough, deep breathe, Incentive Spirometry  - Assess the need for suctioning and perform as needed  - Assess and instruct to report SOB or any respiratory difficulty  - Respiratory Therapy support as indicated  - Manage/alleviate anxiety  - Monitor for signs/symptoms of CO2 retention  Outcome: Progressing     Problem: GASTROINTESTINAL - ADULT  Goal: Maintains or returns to baseline bowel function  Description: INTERVENTIONS:  - Assess bowel function  - Maintain adequate hydration with IV or PO as ordered and tolerated  - Evaluate effectiveness of GI medications  - Encourage mobilization and activity  - Obtain nutritional consult as needed  - Establish a toileting routine/schedule  - Consider collaborating with pharmacy to review patient's medication profile  Outcome: Progressing

## 2024-10-16 NOTE — PROGRESS NOTES
[facility]  part of [facility]    Progress Note    Ollie Hernández Patient Status:  Inpatient    1945 MRN [id number]   Location [facility] 4W/SW/SE Attending Pranay Michel MD   Hosp Day # 18 PCP Wili Parmar MD       Subjective:   Ollie Hernández is a(n) 79 year old male     ROS:     Constitutional: Still feels very weak  ENMT:  Negative for ear drainage, hearing loss and nasal drainage  Eyes:  Negative for eye discharge and vision loss  Cardiovascular:  Negative for chest pain, sob, irregular heartbeat/palpitations  Respiratory:  Negative for cough, dyspnea and wheezing  Gastrointestinal: Having problems with choking when eating  Genitourinary:  Negative for dysuria and hematuria  Endocrine:  Negative for abnormal sleep patterns, increased activity, polydipsia and polyphagia  Hema/Lymph:  Negative for easy bleeding and easy bruising  Integumentary:  Negative for pruritus and rash  Musculoskeletal:  Negative for bone/joint symptoms  Neurological:  Negative for gait disturbance  Psychiatric:  Negative for inappropriate interaction and psychiatric symptoms      Vitals:    10/16/24 1200   BP: 94/44   Pulse: 92   Resp: 18   Temp: 98.1 °F (36.7 °C)           PHYSICAL EXAM:   Constitutional: appears fairly debilitated  Head/Face: normocephalic  Eyes/Vision: normal extraocular motion is intact  Nose/Mouth/Throat:mucous membranes are moist and no oral lesions are noted  Neck/Thyroid: neck is supple without adenopathy  Lymphatic: no abnormal cervical, supraclavicular adenopathy is noted  Respiratory:  lungs are clear to auscultation bilaterally, normal respiratory effort  Cardiovascular: regular rate and rhythm no murmurs, gallups, or rubs  Abdomen: soft, non-tender, non-distended, BS normal  Vascular: well perfused femoral, and pedal pulses normal  Skin/Hair: no unusual rashes present, no abnormal bruising noted  Back/Spine: no abnormalities noted  Musculoskeletal: full ROM all  extremities good strength  no deformities  Extremities: no edema, cyanosis  Neurological:  Grossly normal    Results:     Laboratory Data:  Lab Results   Component Value Date    WBC 15.6 (H) 10/16/2024    HGB 9.4 (L) 10/16/2024    HCT 29.5 (L) 10/16/2024    .0 10/16/2024    CREATSERUM 8.70 (H) 10/16/2024    BUN 97 (H) 10/16/2024     10/16/2024    K 4.7 10/16/2024     10/16/2024    CO2 25.0 10/16/2024     (H) 10/16/2024    CA 10.1 10/16/2024    ALB 3.5 10/11/2024    ALKPHO 115 10/11/2024    BILT 0.3 10/11/2024    TP 6.7 10/11/2024    AST 29 10/11/2024    ALT <7 (L) 10/11/2024    PTT 30.1 08/09/2024    INR 1.45 (H) 08/09/2024    T4F 0.9 08/31/2022    TSH 2.844 07/04/2024     (H) 07/30/2024    PSA 2.94 10/20/2021    DDIMER 6.07 (H) 09/29/2024    ESRML 10 06/16/2024    CRP 1.30 (H) 06/16/2024    MG 2.4 10/16/2024    PHOS 7.6 (H) 10/16/2024    TROP <0.045 07/25/2019     08/05/2023    B12 672 07/04/2024       Imaging:  [unfilled]   XR VIDEO SWALLOW (CPT=74230)    Result Date: 10/15/2024  CONCLUSION:  1. Recurrent large volume aspiration of various barium preparations with cough response.    Dictated by (CST): Juan Bolden MD on 10/15/2024 at 12:50 PM     Finalized by (CST): Juan Bolden MD on 10/15/2024 at 12:55 PM             ASSESSMENT/PLAN:   Assessment       #1 hip fracture recovering will need to go to rehab  #2 ESRD had dialysis today  #3 respiratory failure resolved  #4 coronary disease  #5 history of GI bleeding stable hemoglobin on Epogen  #6 aspiration to get further swallowing studies later this week currently n.p.o.    Discussed with wife             10/16/2024  José Callahan MD

## 2024-10-16 NOTE — PROGRESS NOTES
[facility]      [facility] Cardiology Progress Note    Ollie Hernández Patient Status:  Inpatient    1945 MRN [id number]   Location [facility] 2W/SW Attending Pranay Michel MD   Hosp Day # 18 PCP Wili Parmar MD       Impression/Plan:  79 year old male presenting with:    Impression:  Hip fracture Left - S/P L anterior total hip arthroplasty 10/4/24  Acute resp failure, aspiration and/or pulmonary edema; intubated 2024 extubated 10/6  -Failed weaning trial yesterday  ESRD on HD  DM  PAF, currently SR. S/p watchman device 24  HTN  HL, on statin PTA  Acute on chronic diastolic CHF  CAD s/p Moorcroft circ 24   Coffee grounds in OG tube  -Slight decline in Hb post-op  Elevated troponin, likely demand ischemia  VT-ns  1:24 AM 10/14/24      Plan:  - Cont carvedilol for rate control BP. Follow BP as restarts PRN labetalol    - Cont statin   - Plavix restarted 10/8. Hb stable taking PO  monitor for bleeding (recent Watchman implant 2024) consider restarting ASA 81 if Hb con't to remain stable   - HD as per nephrology  - Monitor on tele  - Reviewed w/ wife and RN's  at bedside   - Not clear cause for sinus tachy possibly pain , hypotension  BB held rebound  , low volume. Hb has been stable.   - EF 55%  TERRI 24 restart BB  when able taking PO   IV at 50 /hour   - Dialysis today          Subjective:     Denies dyspnea or chest pain.   Pt became tachy during dialysis 10/14. 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)    Atherosclerosis of native arteries of extremities with intermittent claudication, bilateral legs (HCC)    Primary hypertension    Smokers' cough (HCC)    Unstable angina (HCC)    Lower GI bleed    ESRD (end stage renal disease) on dialysis (HCC)    PAF (paroxysmal atrial fibrillation) (HCC)    AVM (arteriovenous malformation) of colon    ABLA (acute blood loss anemia)    Rectal bleeding    Anticoagulated    Antiplatelet or antithrombotic long-term use    Lower GI bleeding    ESRD on hemodialysis (HCC)    GI bleed    Closed displaced midcervical fracture of left femur with delayed healing    ESRD (end stage renal disease) (HCC)    Closed fracture of left hip (HCC)    Hyperphosphatemia    Respiratory failure (HCC)    Anemia       Objective:   Temp: 97.7 °F (36.5 °C)  Pulse: 124  Resp: 18  BP: 137/68    Intake/Output:     Intake/Output Summary (Last 24 hours) at 10/16/2024 0656  Last data filed at 10/15/2024 0901  Gross per 24 hour   Intake 150 ml   Output --   Net 150 ml         Last 3 Weights   10/05/24 0600 144 lb 10 oz (65.6 kg)   10/04/24 0600 145 lb 8.1 oz (66 kg)   10/02/24 1310 156 lb 4.9 oz (70.9 kg)   10/02/24 0800 143 lb 11.8 oz (65.2 kg)   10/01/24 0600 160 lb 0.9 oz (72.6 kg)   09/30/24 0400 164 lb 0.4 oz (74.4 kg)   09/28/24 1649 155 lb 11.2 oz (70.6 kg)   08/22/24 1311 159 lb 12.8 oz (72.5 kg)   08/20/24 0933 158 lb (71.7 kg)       Tele: SR/PACs, sinus tach    Physical Exam:    General: Awake   HEENT: Normocephalic, anicteric sclera NG tube out   Neck: supple  Cardiac: Regular rhythm. S1, S2 normal. No murmur, pericardial rub, S3, thrill, heave or extra cardiac sounds.  Lungs: Coarse breath sounds bilat  Abdomen: Soft, non-distended  Extremities: Without clubbing, cyanosis or edema.  SCD boots   Neurologic:  Alert+confused   Skin: Warm and dry.     Laboratory/Data:    Labs:         Recent Labs   Lab 10/11/24  0404 10/12/24  0332 10/13/24  0442 10/14/24  0427 10/16/24  0421   WBC 18.6* 20.5* 19.3* 19.1* 15.6*   HGB 7.9* 8.1* 8.5* 8.5* 9.4*   MCV 94.2 96.3 93.6 96.1 94.9   .0 448.0 452.0* 428.0 421.0       Recent Labs   Lab 10/10/24  0345 10/11/24  0404 10/12/24  0332 10/13/24  0442 10/14/24  0427 10/15/24  1222 10/16/24  0421      < > 142 141 140 139 138   K 4.8   < > 4.5 4.9 4.7 4.9 4.7      < > 104 103 103 101 102   CO2 27.0   < > 27.0 25.0 24.0 24.0 25.0   BUN 43*   < > 48* 80* 112* 86* 97*   CREATSERUM 4.59*   < > 4.23* 6.45* 8.16* 7.54* 8.70*   CA 9.9   < > 10.3 10.3 10.1 10.0 10.1   MG  --   --   --   --  2.5  --  2.4   PHOS 7.7*  --   --   --   --   --  7.6*   *   < > 190* 198* 183* 207* 122*    < > = values in this interval not displayed.       Recent Labs   Lab 10/10/24  0345 10/11/24  0404   ALT <7* <7*   AST 43* 29   ALB 3.9 3.5       No results for input(s): \"TROP\" in the last 168 hours.    Allergies:   Allergies   Allergen Reactions    Adhesive Tape OTHER (SEE COMMENTS)     Severe rashes    Dust Mite Extract RASH       Medications:  Current Facility-Administered Medications   Medication Dose Route Frequency    heparin (Porcine) 1000 UNIT/ML injection 1,500 Units  1.5 mL Intravenous PRN Dialysis    lidocaine-prilocaine (Emla) 2.5-2.5 % cream   Topical PRN    sodium chloride 0.9 % IV bolus 100 mL  100 mL Intravenous Q30 Min PRN    And    albumin human (Albumin) 25% injection 25 g  25 g Intravenous PRN Dialysis    lansoprazole (Prevacid Solutab) disintegrating tab 30 mg  30 mg Oral BID AC    ipratropium-albuterol (Duoneb) 0.5-2.5 (3) MG/3ML inhalation solution 3 mL  3 mL Nebulization 2 times daily    ipratropium-albuterol (Duoneb) 0.5-2.5 (3) MG/3ML inhalation solution 3 mL  3 mL Nebulization Q6H PRN    insulin degludec (Tresiba) 100 units/mL flextouch 10 Units  10 Units Subcutaneous  Daily    insulin aspart (NovoLOG) 100 Units/mL FlexPen 3 Units  3 Units Subcutaneous TID CC    influenza virus trivalent high dose PF (Fluzone HD) 0.5 mL injection (ages >/= 65 years) 0.5 mL  0.5 mL Intramuscular Prior to discharge    clopidogrel (Plavix) tab 75 mg  75 mg Oral Daily    carvedilol (Coreg) tab 25 mg  25 mg Oral BID    sevelamer carbonate (Renvela) tab 800 mg  800 mg Oral TID CC    insulin aspart (NovoLOG) 100 Units/mL FlexPen 1-7 Units  1-7 Units Subcutaneous TID CC and HS    atorvastatin (Lipitor) tab 40 mg  40 mg Oral Nightly    HYDROmorphone (Dilaudid) 1 MG/ML injection 0.1 mg  0.1 mg Intravenous Q2H PRN    Or    HYDROmorphone (Dilaudid) 1 MG/ML injection 0.2 mg  0.2 mg Intravenous Q2H PRN    Or    HYDROmorphone (Dilaudid) 1 MG/ML injection 0.4 mg  0.4 mg Intravenous Q2H PRN    epoetin donna (Epogen, Procrit) 5,000 Units injection  5,000 Units Subcutaneous Once per day on Monday Wednesday Friday    metoclopramide (Reglan) 5 mg/mL injection 10 mg  10 mg Intravenous Q24H PRN    sodium chloride 0.9 % irrigation    Continuous PRN    sodium chloride 0.9 % irrigation    Continuous PRN    ondansetron (Zofran) 4 MG/2ML injection 4 mg  4 mg Intravenous Q6H PRN    diphenhydrAMINE (Benadryl) cap/tab 25 mg  25 mg Oral Q4H PRN    Or    diphenhydrAMINE (Benadryl) 50 mg/mL  injection 12.5 mg  12.5 mg Intravenous Q4H PRN    heparin (Porcine) 5000 UNIT/ML injection 5,000 Units  5,000 Units Subcutaneous Q8H STEPHANIE    dextrose 10% infusion (TPN no rate)   Intravenous Continuous PRN    pancrelipase (Lip-Prot-Amyl) (Zenpep) DR particles cap 10,000 Units  10,000 Units Per G Tube PRN    And    sodium bicarbonate tab 325 mg  325 mg Oral PRN    senna (Senokot) 8.8 MG/5ML oral syrup 17.6 mg  10 mL Oral BID    docusate (Colace) 50 MG/5ML oral solution 100 mg  100 mg Oral BID    mineral oil-white petrolatum (Artificial Tears) 83-15 % ophthalmic ointment 1 Application  1 Application Both Eyes Nightly    lidocaine-menthol 4-1 %  patch 2 patch  2 patch Transdermal Daily    hydrALAzine (Apresoline) 20 mg/mL injection 10 mg  10 mg Intravenous Q6H PRN    labetalol (Trandate) 5 mg/mL injection 10 mg  10 mg Intravenous Q4H PRN    cetirizine (ZyrTEC) tab 5 mg  5 mg Per NG Tube Daily    acetaminophen (Tylenol) 160 MG/5ML oral liquid 500 mg  500 mg Per NG Tube Daily PRN    acetaminophen (Tylenol) tab 650 mg  650 mg Oral Q4H PRN    Or    acetaminophen (Tylenol) 160 MG/5ML oral liquid 650 mg  650 mg Per NG Tube Q4H PRN    Or    acetaminophen (Tylenol) rectal suppository 650 mg  650 mg Rectal Q4H PRN    polyethylene glycol (PEG 3350) (Miralax) 17 g oral packet 17 g  17 g Per NG Tube Daily PRN    bisacodyl (Dulcolax) 10 MG rectal suppository 10 mg  10 mg Rectal Daily PRN    albuterol (Ventolin HFA) 108 (90 Base) MCG/ACT inhaler 2 puff  2 puff Inhalation Q4H PRN    fluticasone propionate (Flonase) 50 MCG/ACT nasal suspension 2 spray  2 spray Nasal Daily PRN    glucose (Dex4) 15 GM/59ML oral liquid 15 g  15 g Oral Q15 Min PRN    Or    glucose (Glutose) 40% oral gel 15 g  15 g Oral Q15 Min PRN    Or    glucose-vitamin C (Dex-4) chewable tab 4 tablet  4 tablet Oral Q15 Min PRN    Or    dextrose 50% injection 50 mL  50 mL Intravenous Q15 Min PRN    Or    glucose (Dex4) 15 GM/59ML oral liquid 30 g  30 g Oral Q15 Min PRN    Or    glucose (Glutose) 40% oral gel 30 g  30 g Oral Q15 Min PRN    Or    glucose-vitamin C (Dex-4) chewable tab 8 tablet  8 tablet Oral Q15 Min PRN    fluticasone-salmeterol (Advair Diskus) 250-50 MCG/ACT inhaler 1 puff  1 puff Inhalation BID

## 2024-10-17 ENCOUNTER — APPOINTMENT (OUTPATIENT)
Dept: PICC SERVICES | Facility: HOSPITAL | Age: 79
End: 2024-10-17
Attending: HOSPITALIST
Payer: MEDICARE

## 2024-10-17 LAB
ALBUMIN SERPL-MCNC: 3.8 G/DL (ref 3.2–4.8)
ALBUMIN/GLOB SERPL: 1.1 {RATIO} (ref 1–2)
ALP LIVER SERPL-CCNC: 103 U/L
ALT SERPL-CCNC: 19 U/L
ANION GAP SERPL CALC-SCNC: 12 MMOL/L (ref 0–18)
AST SERPL-CCNC: 39 U/L (ref ?–34)
ATRIAL RATE: 296 BPM
BILIRUB SERPL-MCNC: 0.4 MG/DL (ref 0.2–1.1)
BUN BLD-MCNC: 57 MG/DL (ref 9–23)
BUN/CREAT SERPL: 8.9 (ref 10–20)
CALCIUM BLD-MCNC: 9.9 MG/DL (ref 8.7–10.4)
CHLORIDE SERPL-SCNC: 100 MMOL/L (ref 98–112)
CO2 SERPL-SCNC: 26 MMOL/L (ref 21–32)
CREAT BLD-MCNC: 6.37 MG/DL
DEPRECATED RDW RBC AUTO: 60.7 FL (ref 35.1–46.3)
EGFRCR SERPLBLD CKD-EPI 2021: 8 ML/MIN/1.73M2 (ref 60–?)
ERYTHROCYTE [DISTWIDTH] IN BLOOD BY AUTOMATED COUNT: 16.5 % (ref 11–15)
GLOBULIN PLAS-MCNC: 3.4 G/DL (ref 2–3.5)
GLUCOSE BLD-MCNC: 177 MG/DL (ref 70–99)
GLUCOSE BLDC GLUCOMTR-MCNC: 152 MG/DL (ref 70–99)
GLUCOSE BLDC GLUCOMTR-MCNC: 162 MG/DL (ref 70–99)
GLUCOSE BLDC GLUCOMTR-MCNC: 173 MG/DL (ref 70–99)
GLUCOSE BLDC GLUCOMTR-MCNC: 204 MG/DL (ref 70–99)
GLUCOSE BLDC GLUCOMTR-MCNC: 250 MG/DL (ref 70–99)
HCT VFR BLD AUTO: 32.7 %
HGB BLD-MCNC: 9.8 G/DL
MAGNESIUM SERPL-MCNC: 2.2 MG/DL (ref 1.6–2.6)
MCH RBC QN AUTO: 30.2 PG (ref 26–34)
MCHC RBC AUTO-ENTMCNC: 30 G/DL (ref 31–37)
MCV RBC AUTO: 100.6 FL
OSMOLALITY SERPL CALC.SUM OF ELEC: 306 MOSM/KG (ref 275–295)
P AXIS: 268 DEGREES
PHOSPHATE SERPL-MCNC: 7.4 MG/DL (ref 2.4–5.1)
PLATELET # BLD AUTO: 385 10(3)UL (ref 150–450)
POTASSIUM SERPL-SCNC: 4.6 MMOL/L (ref 3.5–5.1)
PROT SERPL-MCNC: 7.2 G/DL (ref 5.7–8.2)
Q-T INTERVAL: 356 MS
QRS DURATION: 72 MS
QTC CALCULATION (BEZET): 558 MS
R AXIS: 37 DEGREES
RBC # BLD AUTO: 3.25 X10(6)UL
SODIUM SERPL-SCNC: 138 MMOL/L (ref 136–145)
T AXIS: 231 DEGREES
TRIGL SERPL-MCNC: 259 MG/DL (ref 30–149)
VENTRICULAR RATE: 148 BPM
WBC # BLD AUTO: 14.6 X10(3) UL (ref 4–11)

## 2024-10-17 PROCEDURE — 99233 SBSQ HOSP IP/OBS HIGH 50: CPT | Performed by: HOSPITALIST

## 2024-10-17 PROCEDURE — 99232 SBSQ HOSP IP/OBS MODERATE 35: CPT | Performed by: INTERNAL MEDICINE

## 2024-10-17 RX ORDER — ALBUMIN (HUMAN) 12.5 G/50ML
25 SOLUTION INTRAVENOUS
Status: ACTIVE | OUTPATIENT
Start: 2024-10-17 | End: 2024-10-19

## 2024-10-17 RX ORDER — LIDOCAINE/PRILOCAINE 2.5 %-2.5%
CREAM (GRAM) TOPICAL AS NEEDED
Status: DISCONTINUED | OUTPATIENT
Start: 2024-10-17 | End: 2024-10-22

## 2024-10-17 RX ORDER — DILTIAZEM HYDROCHLORIDE 30 MG/1
30 TABLET, FILM COATED ORAL EVERY 6 HOURS SCHEDULED
Status: DISCONTINUED | OUTPATIENT
Start: 2024-10-17 | End: 2024-10-20

## 2024-10-17 RX ORDER — DILTIAZEM HYDROCHLORIDE 5 MG/ML
INJECTION INTRAVENOUS
Status: COMPLETED
Start: 2024-10-17 | End: 2024-10-17

## 2024-10-17 RX ORDER — HEPARIN SODIUM 1000 [USP'U]/ML
1.5 INJECTION, SOLUTION INTRAVENOUS; SUBCUTANEOUS
Status: DISCONTINUED | OUTPATIENT
Start: 2024-10-17 | End: 2024-10-22

## 2024-10-17 RX ORDER — DILTIAZEM HYDROCHLORIDE 5 MG/ML
10 INJECTION INTRAVENOUS ONCE
Status: COMPLETED | OUTPATIENT
Start: 2024-10-17 | End: 2024-10-17

## 2024-10-17 RX ORDER — DIGOXIN 0.25 MG/ML
250 INJECTION INTRAMUSCULAR; INTRAVENOUS ONCE
Status: COMPLETED | OUTPATIENT
Start: 2024-10-17 | End: 2024-10-17

## 2024-10-17 NOTE — CM/SW NOTE
Care Progression Note:  Length of stay: 19  GMLOS: 22  Avoidable Delays:   Code Status: FULL    Acute Medical Issue/Factors:   Aspiration PNA-NPO, failing swallow evals, new PEG planned for 10/19. Currently on PPN. Pt can dc once TF goal is met.    Rapid A-flutter/CAD/elevated troponin/HF-CARDS consulted. Now on telemetry monitoring. Cardizem drip dc'd, restarting PO Cardizem. New amiodarone drip ordered. Coreg, statin, ASA, and plavix on hold for PEG placement.    ESRD-NEPHRO consulted. Well managed w/HD MWF, Epogen ordered on days of HD. Renvela ordered TID.    DM-Hyperglycemia noted, BG over 200 last 2 glucose checks. Managing w/SS Novolog.    Left hip fx/deconditioning-awaiting [facility] placement.    Ins auth for [facility] expiring today. New auth will be needed prior to dc.     Discharge Barriers: Physical/emotional and/or cognitive functioning   Expected discharge date: 10/21   Expected next site of care: Sub-acute Rehab.     Plan: [facility] [place] for [facility] pending ins auth and medical clearance.    / available for discharge planning.     MAR Clarke    [phone]

## 2024-10-17 NOTE — PLAN OF CARE
Problem: Patient Centered Care  Goal: Patient preferences are identified and integrated in the patient's plan of care  Description: Interventions:  - What would you like us to know as we care for you?   - Provide timely, complete, and accurate information to patient/family  - Incorporate patient and family knowledge, values, beliefs, and cultural backgrounds into the planning and delivery of care  - Encourage patient/family to participate in care and decision-making at the level they choose  - Honor patient and family perspectives and choices  10/17/2024 1847 by Lindsya Haas RN  Outcome: Progressing  10/17/2024 1511 by Lindsay Haas RN  Outcome: Progressing     Problem: Diabetes/Glucose Control  Goal: Glucose maintained within prescribed range  Description: INTERVENTIONS:  - Monitor Blood Glucose as ordered  - Assess for signs and symptoms of hyperglycemia and hypoglycemia  - Administer ordered medications to maintain glucose within target range  - Assess barriers to adequate nutritional intake and initiate nutrition consult as needed  - Instruct patient on self management of diabetes  10/17/2024 1847 by Lindsay Haas RN  Outcome: Progressing  10/17/2024 1511 by Lindsay Haas RN  Outcome: Progressing     Problem: Patient/Family Goals  Goal: Patient/Family Long Term Goal  Description: Patient's Long Term Goal: stay out of the hospital.     Interventions:  - See additional Care Plan goals for specific interventions  10/17/2024 1847 by Lindsay Haas RN  Outcome: Progressing  10/17/2024 1511 by Lindsay Haas RN  Outcome: Progressing  Goal: Patient/Family Short Term Goal  Description: Patient's Short Term Goal: to go home.     Interventions:   - See additional Care Plan goals for specific interventions  10/17/2024 1847 by Lindsay Haas RN  Outcome: Progressing  10/17/2024 1511 by Lindsay Haas RN  Outcome: Progressing     Problem: PAIN - ADULT  Goal:  Verbalizes/displays adequate comfort level or patient's stated pain goal  Description: INTERVENTIONS:  - Encourage pt to monitor pain and request assistance  - Assess pain using appropriate pain scale  - Administer analgesics based on type and severity of pain and evaluate response  - Implement non-pharmacological measures as appropriate and evaluate response  - Consider cultural and social influences on pain and pain management  - Manage/alleviate anxiety  - Utilize distraction and/or relaxation techniques  - Monitor for opioid side effects  - Notify MD/LIP if interventions unsuccessful or patient reports new pain  - Anticipate increased pain with activity and pre-medicate as appropriate  10/17/2024 1847 by Lindsay Haas RN  Outcome: Progressing  10/17/2024 1511 by Lindsay Haas RN  Outcome: Progressing     Problem: DISCHARGE PLANNING  Goal: Discharge to home or other facility with appropriate resources  Description: INTERVENTIONS:  - Identify barriers to discharge w/pt and caregiver  - Include patient/family/discharge partner in discharge planning  - Arrange for needed discharge resources and transportation as appropriate  - Identify discharge learning needs (meds, wound care, etc)  - Arrange for interpreters to assist at discharge as needed  - Consider post-discharge preferences of patient/family/discharge partner  - Complete POLST form as appropriate  - Assess patient's ability to be responsible for managing their own health  - Refer to Case Management Department for coordinating discharge planning if the patient needs post-hospital services based on physician/LIP order or complex needs related to functional status, cognitive ability or social support system  10/17/2024 1847 by Lindsay Haas RN  Outcome: Progressing  10/17/2024 1511 by Lindsay Haas RN  Outcome: Progressing     Problem: SAFETY ADULT - FALL  Goal: Free from fall injury  Description: INTERVENTIONS:  - Assess pt  frequently for physical needs  - Identify cognitive and physical deficits and behaviors that affect risk of falls.  - [place] fall precautions as indicated by assessment.  - Educate pt/family on patient safety including physical limitations  - Instruct pt to call for assistance with activity based on assessment  - Modify environment to reduce risk of injury  - Provide assistive devices as appropriate  - Consider OT/PT consult to assist with strengthening/mobility  - Encourage toileting schedule  10/17/2024 1847 by Lindsay Haas RN  Outcome: Progressing  10/17/2024 1511 by Lindsay Haas RN  Outcome: Progressing     Problem: CARDIOVASCULAR - ADULT  Goal: Maintains optimal cardiac output and hemodynamic stability  Description: INTERVENTIONS:  - Monitor vital signs, rhythm, and trends  - Monitor for bleeding, hypotension and signs of decreased cardiac output  - Evaluate effectiveness of vasoactive medications to optimize hemodynamic stability  - Monitor arterial and/or venous puncture sites for bleeding and/or hematoma  - Assess quality of pulses, skin color and temperature  - Assess for signs of decreased coronary artery perfusion - ex. Angina  - Evaluate fluid balance, assess for edema, trend weights  10/17/2024 1847 by Lindsay Haas RN  Outcome: Progressing  10/17/2024 1511 by Lindsay Haas RN  Outcome: Progressing  Goal: Absence of cardiac arrhythmias or at baseline  Description: INTERVENTIONS:  - Continuous cardiac monitoring, monitor vital signs, obtain 12 lead EKG if indicated  - Evaluate effectiveness of antiarrhythmic and heart rate control medications as ordered  - Initiate emergency measures for life threatening arrhythmias  - Monitor electrolytes and administer replacement therapy as ordered  10/17/2024 1847 by Lindsay Haas RN  Outcome: Progressing  10/17/2024 1511 by Lindsay Haas RN  Outcome: Progressing     Problem: GASTROINTESTINAL - ADULT  Goal:  Maintains or returns to baseline bowel function  Description: INTERVENTIONS:  - Assess bowel function  - Maintain adequate hydration with IV or PO as ordered and tolerated  - Evaluate effectiveness of GI medications  - Encourage mobilization and activity  - Obtain nutritional consult as needed  - Establish a toileting routine/schedule  - Consider collaborating with pharmacy to review patient's medication profile  10/17/2024 1847 by Lindsay Haas, RN  Outcome: Progressing  10/17/2024 1511 by Lindsay Haas, RN  Outcome: Progressing      Amiodarone drip. Given Iv push Dig once. Alert x 4. NPO.

## 2024-10-17 NOTE — OCCUPATIONAL THERAPY NOTE
OCCUPATIONAL THERAPY TREATMENT NOTE - INPATIENT    Room Number: 415/415-A     Presenting Problem: Closed L femur fracture; s/p L anterior replacement; WBAT, anterior hip precautions     Problem List  Principal Problem:    Closed displaced midcervical fracture of left femur with delayed healing  Active Problems:    ESRD (end stage renal disease) ([facility])    Closed fracture of left hip ([facility])    Hyperphosphatemia    Respiratory failure ([facility])    Anemia      OCCUPATIONAL THERAPY ASSESSMENT   Patient demonstrates good  progress this session, goals remain in progress.    Patient is requiring up to max assist for ADLs as a result of the following impairments: decreased functional strength, decreased functional reach, decreased endurance, pain, impaired balance, decreased muscular endurance, and difficulty maintaining precautions.    Patient continues to function below baseline with  ADLs and fx mobility/transfers .  Next session anticipate patient to progress lower body dressing, static standing balance, dynamic standing balance, and functional standing tolerance.  Occupational Therapy will continue to follow patient for duration of hospitalization.    Patient continues to benefit from continued skilled OT services: to promote return to prior level of function and safety with continuous assistance and gradual rehabilitative therapy.     PLAN DURING HOSPITALIZATION  OT Device Recommendations: TBD  OT Treatment Plan: Compensatory technique education;Patient/Family training;Patient/Family education;Endurance training;ADL training;Functional transfer training     SUBJECTIVE  Patient agreeable to OT treatment session.    OBJECTIVE  Precautions: WENDY - anterior;Aspiration    WEIGHT BEARING RESTRICTION  L Lower Extremity: Weight Bearing as Tolerated    PAIN ASSESSMENT  Rating: -- (not rated)  Location: sacrum  Management Techniques: Activity promotion; Body mechanics; Repositioning (pressure relief cushion beneath patient in  chair)    ACTIVITIES OF DAILY LIVING ASSESSMENT  AM-PAC ‘6-Clicks’ Inpatient Daily Activity Short Form  How much help from another person does the patient currently need…  -   Putting on and taking off regular lower body clothing?: A Lot  -   Bathing (including washing, rinsing, drying)?: A Lot  -   Toileting, which includes using toilet, bedpan or urinal? : A Lot  -   Putting on and taking off regular upper body clothing?: A Lot  -   Taking care of personal grooming such as brushing teeth?: A Little  -   Eating meals?: A Little    AM-PAC Score:  Score: 14  Approx Degree of Impairment: 59.67%  Standardized Score (AM-PAC Scale): 33.39  CMS Modifier (G-Code): CK    BED MOBILITY  Supine to Sit: mod assist    FUNCTIONAL TRANSFER ASSESSMENT  Sit to Stand from EOB: min assist  Chair Transfer: min assist  Comments:  Repeat verbal cues for safe hand placement and proper body mechanics to facilitate transfers provided.    FUNCTIONAL MOBILITY  min assist for steps for steps from EOB to chair using RW   Comments:   Unable to tolerate further mobility despite prolonged seated recovery period. Patient audibly dyspneic, however, denied SOB.    ACTIVITIES OF DAILY LIVING  LB Dressing: max assist  Toileting: max assist    EDUCATION PROVIDED  Patient Education : Role of Occupational Therapy; Discharge Recommendations; Plan of Care; Functional Transfer Techniques; Posture/Positioning; Weight Bear Status; Fall Prevention; Surgical Precautions; Proper Body Mechanics; Energy Conservation  Patient's Response to Education: Verbalized Understanding; Returned Demonstration    The patient's Approx Degree of Impairment: 59.67% has been calculated based on documentation in the [facility] '6 clicks' Inpatient Daily Activity Short Form.  Research supports that patients with this level of impairment may benefit from rehab.  Final disposition will be made by interdisciplinary medical team.    Patient End of Session: Up in chair;Call light within  reach;Needs met;RN aware of session/findings;All patient questions and concerns addressed;Hospital anti-slip socks;Family present    OT Goals:       Patient will complete LE dressing with SBA   Comment: ongoing    Patient will complete toilet transfer with SBA   Comment: ongoing    Patient will complete self care task at sink level with SBA    Comment: ongoing     Goals  on: 24  Frequency:3-5x week    OT Session Time  Therapeutic Activity: 23 minutes    KAREN Saleem/L  [facility]  #[id number]

## 2024-10-17 NOTE — PROGRESS NOTES
[facility]  part of [facility]    Progress Note    Ollie Hernández Patient Status:  Inpatient    1945 MRN [id number]   Location [facility] 4W/SW/SE Attending Pranay Michel MD   Hosp Day # 19 PCP Wili Parmar MD     Chief Complaint:   Chief Complaint   Patient presents with    Fall       Subjective:   Ollie Hernández is up to the chair today. Still c/o severe pain in hip however appears more comfortable. He converted to rapid A.fib 11am has been with -150's since then. Cards aware per RN.   He denies any dizziness no CP no SOB no palpitations felt.   Objective:   Objective:    Blood pressure 102/43, pulse 80, temperature 97.7 °F (36.5 °C), temperature source Oral, resp. rate 18, height 5' 4.02\" (1.626 m), weight 144 lb 10 oz (65.6 kg), SpO2 98%.    Physical Exam:    General: No acute distress. Appears frail, ill   Respiratory: Clear to auscultation bilaterally. No wheezes. No rhonchi.  Cardiovascular: Irregularly irregular.   Abdomen: Soft, nontender, nondistended.  Positive bowel sounds. No rebound or guarding.  Neurologic: No focal neurological deficits.   Musculoskeletal: Moves all extremities.  Extremities: No edema.      Results:   Results:    Labs:  Recent Labs   Lab 10/12/24  0332 10/13/24  0442 10/14/24  0427 10/16/24  0421 10/17/24  0412   WBC 20.5* 19.3* 19.1* 15.6* 14.6*   HGB 8.1* 8.5* 8.5* 9.4* 9.8*   MCV 96.3 93.6 96.1 94.9 100.6*   .0 452.0* 428.0 421.0 385.0       Recent Labs   Lab 10/11/24  0404 10/12/24  0332 10/15/24  1222 10/16/24  0421 10/17/24  0412   *   < > 207* 122* 177*   BUN 86*   < > 86* 97* 57*   CREATSERUM 6.95*   < > 7.54* 8.70* 6.37*   CA 9.9   < > 10.0 10.1 9.9   ALB 3.5  --   --   --  3.8      < > 139 138 138   K 4.6   < > 4.9 4.7 4.6      < > 101 102 100   CO2 31.0   < > 24.0 25.0 26.0   ALKPHO 115  --   --   --  103   AST 29  --   --   --  39*   ALT <7*  --   --   --  19   BILT 0.3  --   --   --  0.4   TP  6.7  --   --   --  7.2    < > = values in this interval not displayed.       Estimated Creatinine Clearance: 8.1 mL/min (A) (based on SCr of 6.37 mg/dL (H)).    No results for input(s): \"PTP\", \"INR\" in the last 168 hours.         Culture:  Hospital Encounter on 09/28/24   1. Blood Culture     Status: None    Collection Time: 09/30/24 10:03 AM    Specimen: Bld,Arterial Line; Blood   Result Value Ref Range    Blood Culture Result No Growth 5 Days N/A       Cardiac  No results for input(s): \"TROP\", \"PBNP\" in the last 168 hours.      Imaging: Imaging data reviewed in Epic.  No results found.    Medications:    dilTIAZem  30 mg Oral 4 times per day    pantoprazole  40 mg Intravenous Q24H    dilTIAZem        acetaminophen  1,000 mg Intravenous Q8H    ipratropium-albuterol  3 mL Nebulization 2 times daily    clopidogrel  75 mg Oral Daily    carvedilol  25 mg Oral BID    sevelamer carbonate  800 mg Oral TID CC    insulin aspart  1-7 Units Subcutaneous TID CC and HS    atorvastatin  40 mg Oral Nightly    epoetin donna  5,000 Units Subcutaneous Once per day on Monday Wednesday Friday    heparin  5,000 Units Subcutaneous Q8H STEPHANIE    senna  10 mL Oral BID    mineral oil-white petrolatum  1 Application Both Eyes Nightly    lidocaine-menthol  2 patch Transdermal Daily    fluticasone-salmeterol  1 puff Inhalation BID         Assessment and Plan:   Assessment & Plan:        Acute hypoxemic respiratory failure - resolved  Hypotension - resolved  Aspiration PNA - better   Improved.   Pulmonary and cards on consult.   On RA now was briefly intubated on mechanical ventilation 9/29-10/6.   Completed course of zosyn.   Was in ICU has been transferred to floor.   SLP eval - Evidence of aspiration   NPO. Plans for PEG tube placement on Saturday (due to getting Plavix on 10/15) Plavix on hold   Rapid Atrial flutter   Cards on consult.   Cardizem 5mg IVP followed by cardizem gtt.   Check Mg and K level.   Transfer to Cardiac tele bed. D/w  EP  ESRD   Renal on cosnult.   HD M,W,F  Epogen, renvela   L hip fracture   S/p mechanical fall   Orthopedic surgery signed off   S/p L hip arthroplasty 10/4/24  Pain uncontrolled.  IV tylenol. Cont PRN dilaudid once PEG tube in can start PO meds via G tube  XR L hip as pt continues with pain   PT/OT - [facility]   DM II   Stop scheduled insulin as NPO  ISS   Acute on chronic HFpEF - better   CAD   Elevated troponin secondary to demand ischemia   Cards on consult.   Coreg, statin , ASA. Plavix on hold due to PEG placement planned. All PO meds not given due to NPO   Transfer to [facility] bed.   Coffee ground emesis - resolved.   IV PPI BID   Hemodynamically stable.   Plavix on hold   H/H stable.   Deconditioned   PT/OT - [facility]          >55min spent, >50% spent counseling and coordinating care in the form of educating pt/family and d/w consultants and staff. Most of the time spent discussing the above plan.        Plan of care discussed with patient or family at bedside.    Pranay Michel MD  Hospitalist          Supplementary Documentation:     Quality:  DVT Prophylaxis: SCD heparin on hold   CODE status: Full   Dispo: per clinical course            Estimated date of discharge: TBD  Discharge is dependent on: clinical stability  At this point Mr. Hernández is expected to be discharge to: [facility]

## 2024-10-17 NOTE — PLAN OF CARE
1300: Pt sustaining hr 130-140's and still in a-fib, asymptomatic. Cardiology made aware and Dr. Michel here to assess patient. Dr. Michel ordered a stat EKG and spoke with cardiology.  SANDOR Huitron here to give cardizem IVP. Will transfer to tele for cardizem drip.  Patient transferred to remote tele on defibrillator with SANDOR Huitron and this writer.

## 2024-10-17 NOTE — PLAN OF CARE
Patient has safety precautions in place bed in the lowest position, bed alarm on, and call light within reach. Plan of care ongoing. No further concerns as of present.    Problem: Patient Centered Care  Goal: Patient preferences are identified and integrated in the patient's plan of care  Description: Interventions:    - Provide timely, complete, and accurate information to patient/family  - Incorporate patient and family knowledge, values, beliefs, and cultural backgrounds into the planning and delivery of care  - Encourage patient/family to participate in care and decision-making at the level they choose  - Honor patient and family perspectives and choices  Outcome: Progressing     Problem: Diabetes/Glucose Control  Goal: Glucose maintained within prescribed range  Description: INTERVENTIONS:  - Monitor Blood Glucose as ordered  - Assess for signs and symptoms of hyperglycemia and hypoglycemia  - Administer ordered medications to maintain glucose within target range  - Assess barriers to adequate nutritional intake and initiate nutrition consult as needed  - Instruct patient on self management of diabetes  Outcome: Progressing  Problem: PAIN - ADULT  Goal: Verbalizes/displays adequate comfort level or patient's stated pain goal  Description: INTERVENTIONS:  - Encourage pt to monitor pain and request assistance  - Assess pain using appropriate pain scale  - Administer analgesics based on type and severity of pain and evaluate response  - Implement non-pharmacological measures as appropriate and evaluate response  - Consider cultural and social influences on pain and pain management  - Manage/alleviate anxiety  - Utilize distraction and/or relaxation techniques  - Monitor for opioid side effects  - Notify MD/LIP if interventions unsuccessful or patient reports new pain  - Anticipate increased pain with activity and pre-medicate as appropriate  Outcome: Progressing     Problem: SAFETY ADULT - FALL  Goal: Free from  fall injury  Description: INTERVENTIONS:  - Assess pt frequently for physical needs  - Identify cognitive and physical deficits and behaviors that affect risk of falls.  - [place] fall precautions as indicated by assessment.  - Educate pt/family on patient safety including physical limitations  - Instruct pt to call for assistance with activity based on assessment  - Modify environment to reduce risk of injury  - Provide assistive devices as appropriate  - Consider OT/PT consult to assist with strengthening/mobility  - Encourage toileting schedule  Outcome: Progressing     Problem: DISCHARGE PLANNING  Goal: Discharge to home or other facility with appropriate resources  Description: INTERVENTIONS:  - Identify barriers to discharge w/pt and caregiver  - Include patient/family/discharge partner in discharge planning  - Arrange for needed discharge resources and transportation as appropriate  - Identify discharge learning needs (meds, wound care, etc)  - Arrange for interpreters to assist at discharge as needed  - Consider post-discharge preferences of patient/family/discharge partner  - Complete POLST form as appropriate  - Assess patient's ability to be responsible for managing their own health  - Refer to Case Management Department for coordinating discharge planning if the patient needs post-hospital services based on physician/LIP order or complex needs related to functional status, cognitive ability or social support system  Outcome: Progressing     Problem: CARDIOVASCULAR - ADULT  Goal: Maintains optimal cardiac output and hemodynamic stability  Description: INTERVENTIONS:  - Monitor vital signs, rhythm, and trends  - Monitor for bleeding, hypotension and signs of decreased cardiac output  - Evaluate effectiveness of vasoactive medications to optimize hemodynamic stability  - Monitor arterial and/or venous puncture sites for bleeding and/or hematoma  - Assess quality of pulses, skin color and temperature  -  Assess for signs of decreased coronary artery perfusion - ex. Angina  - Evaluate fluid balance, assess for edema, trend weights  Outcome: Progressing  Goal: Absence of cardiac arrhythmias or at baseline  Description: INTERVENTIONS:  - Continuous cardiac monitoring, monitor vital signs, obtain 12 lead EKG if indicated  - Evaluate effectiveness of antiarrhythmic and heart rate control medications as ordered  - Initiate emergency measures for life threatening arrhythmias  - Monitor electrolytes and administer replacement therapy as ordered  Outcome: Progressing     Problem: RESPIRATORY - ADULT  Goal: Achieves optimal ventilation and oxygenation  Description: INTERVENTIONS:  - Assess for changes in respiratory status  - Assess for changes in mentation and behavior  - Position to facilitate oxygenation and minimize respiratory effort  - Oxygen supplementation based on oxygen saturation or ABGs  - Provide Smoking Cessation handout, if applicable  - Encourage broncho-pulmonary hygiene including cough, deep breathe, Incentive Spirometry  - Assess the need for suctioning and perform as needed  - Assess and instruct to report SOB or any respiratory difficulty  - Respiratory Therapy support as indicated  - Manage/alleviate anxiety  - Monitor for signs/symptoms of CO2 retention  Outcome: Progressing     Problem: GASTROINTESTINAL - ADULT  Goal: Maintains or returns to baseline bowel function  Description: INTERVENTIONS:  - Assess bowel function  - Maintain adequate hydration with IV or PO as ordered and tolerated  - Evaluate effectiveness of GI medications  - Encourage mobilization and activity  - Obtain nutritional consult as needed  - Establish a toileting routine/schedule  - Consider collaborating with pharmacy to review patient's medication profile  Outcome: Progressing

## 2024-10-17 NOTE — CM/SW NOTE
Clinical updates sent to [facility] via [employer].    Per chart, plan for PEG placement on Saturday 10/19.   Pt's insurance auth expires today 10/17. New Auth will be needed when pt is closer to medical clearance.    PLAN: [facility] w/ onsite HD - pending PEG placement, TF's at goal, new Ins Auth, & med clear      SW/CM to remain available for support and/or discharge planning.       MS LucinaW, LSW [id number]

## 2024-10-17 NOTE — PHYSICAL THERAPY NOTE
PHYSICAL THERAPY HIP TREATMENT NOTE - INPATIENT    Room Number: 415/415-A            Presenting Problem: fall, s/p L WENDY  Co-Morbidities : Anemia    Asthma ([facility])   Back problem   BPH (benign prostatic hyperplasia)   Calculus of kidney   Cataract   Coronary atherosclerosis   Diabetes ([facility])   ESRD (end stage renal disease) on dialysis ([facility])   Essential hypertension   High blood pressure   High cholesterol   History of blood transfusion   Hyperlipidemia   Neuropathy    hands and feet   KRAIG on CPAP   Renal disorder   Sleep apnea   Visual impairment    glasses   Vocal cord paralysis, unilateral partial    Problem List  Principal Problem:    Closed displaced midcervical fracture of left femur with delayed healing  Active Problems:    ESRD (end stage renal disease) ([facility])    Closed fracture of left hip ([facility])    Hyperphosphatemia    Respiratory failure ([facility])    Anemia      PHYSICAL THERAPY ASSESSMENT   Patient demonstrates limited progress this session, goals  remain in progress.      Patient is requiring moderate assist as a result of the following impairments: decreased functional strength, decreased endurance/aerobic capacity, pain, and decreased muscular endurance.     Patient continues to function below baseline with bed mobility, transfers, gait, and standing prolonged periods.  Next session anticipate patient to progress bed mobility, transfers, gait, and standing prolonged periods.  Physical Therapy will continue to follow patient for duration of hospitalization.    Patient continues to benefit from continued skilled PT services: to promote return to prior level of function and safety with continuous assistance and gradual rehabilitative therapy .    PLAN DURING HOSPITALIZATION  Nursing Mobility Recommendation : 1 Assist     PT Treatment Plan: Bed mobility;Body mechanics;Coordination;Endurance;Patient education;Gait training;Strengthening;Transfer training;Balance training  Frequency (Obs): Daily     SUBJECTIVE  Pt  was agreeable to therapy session.         OBJECTIVE  Precautions: WENDY - anterior;Aspiration    WEIGHT BEARING RESTRICTION  L Lower Extremity: Weight Bearing as Tolerated      PAIN ASSESSMENT   Rating:  (did not rate)  Location: L LE  Management Techniques: Activity promotion;Body mechanics;Relaxation;Repositioning    BALANCE  Static Sitting: Fair +  Dynamic Sitting: Fair  Static Standing: Poor +  Dynamic Standing: Poor +  ACTIVITY TOLERANCE                         O2 WALK       AM-PAC '6-Clicks' INPATIENT SHORT FORM - BASIC MOBILITY  How much difficulty does the patient currently have...  Patient Difficulty: Turning over in bed (including adjusting bedclothes, sheets and blankets)?: A Lot   Patient Difficulty: Sitting down on and standing up from a chair with arms (e.g., wheelchair, bedside commode, etc.): A Little   Patient Difficulty: Moving from lying on back to sitting on the side of the bed?: A Lot   How much help from another person does the patient currently need...   Help from Another: Moving to and from a bed to a chair (including a wheelchair)?: A Little   Help from Another: Need to walk in hospital room?: A Little   Help from Another: Climbing 3-5 steps with a railing?: A Lot     AM-PAC Score:  Raw Score: 15   Approx Degree of Impairment: 57.7%   Standardized Score (AM-PAC Scale): 39.45   CMS Modifier (G-Code): CK    FUNCTIONAL ABILITY STATUS  Functional Mobility/Gait Assessment  Gait Assistance: Minimum assistance  Distance (ft): few steps to the chair  Assistive Device: Rolling walker  Pattern: L Decreased stance time;Shuffle  Rolling: moderate assist  Supine to Sit: moderate assist  Sit to Supine:  NT  Sit to Stand: minimal assist    Skilled Therapy Provided: Pt is received in the bed and was cleared for therapy session. Pt is mod A with bed mobility and to transfer to the EOB. Pt required assist with his upper body to sit up. Pt then was able to pull himself to the EOB with assist.  Pt required extra  time to perform. Pt sat EOB for a few minutes and denied any dizziness and light headedness. Pt then was min A with sit<>stand transfers with the RW. Pt is cued for proper hand placement for safety. Pt was able to take a few steps from the bed to the chair with the RW min A  for balance and safety. Pt with slow shuffling steps with narrow MAY. Pt then sat in the chair with all needs within reach. Pt rested for a while in the chair then declined to stand and further AMB due to his decreased endurence.  Reported to the RN on the status of the pt.     The patient's Approx Degree of Impairment: 57.7% has been calculated based on documentation in the [facility] '6 clicks' Inpatient Basic Mobility Short Form.  Research supports that patients with this level of impairment may benefit from Rehab.  Final disposition will be made by interdisciplinary medical team.      Patient End of Session: Up in chair;Needs met;Call light within reach;RN aware of session/findings;All patient questions and concerns addressed;Alarm set;Family present    CURRENT GOALS   Goals to be met by: 10/22/24  Patient Goal Patient's self-stated goal is: to return to PLOF   Goal #1 Patient is able to demonstrate supine - sit EOB @ level: moderate assistance      Goal #1   Current Status Mod A    Goal #2 Patient is able to demonstrate transfers EOB to/from Chair/Wheelchair at assistance level: moderate assistance with  least restrictive device      Goal #2  Current Status Min A with the RW    Goal #3 Patient is able to ambulate >10 feet with assist device:  least restrictive device  at assistance level: moderate assistance   Goal #3   Current Status Few steps to the chair with the RW min A    Goal #4  Patient is able to ambulate 20 feet using rolling walker at assistance level: CGA   Goal #4   Current Status  NEW GOAL 10/15/24   Same as above.    Goal #5 Patient to demonstrate independence with home activity/exercise instructions provided to patient in  preparation for discharge.   Goal #5   Current Status  IN PROGRESS   Goal #6     Goal #6  Current Status         Therapeutic Activity: 25 minutes

## 2024-10-17 NOTE — PROGRESS NOTES
[facility]      [facility] Progress Note    Ollie Hernández Patient Status:  Inpatient    1945 MRN [id number]   Location [facility] 2W/SW Attending Pranay Michel MD   Hosp Day # 19 PCP Wili Parmar MD       Impression/Plan:  79 year old male presenting with:    Impression:  Hip fracture Left - S/P L anterior total hip arthroplasty 10/4/24  Acute resp failure, aspiration and/or pulmonary edema; intubated 2024 extubated 10/6  -Failed weaning trial yesterday  ESRD on HD  DM  PAF, currently SR. S/p watchman device 24  HTN  HL, on statin PTA  Acute on chronic diastolic CHF  CAD s/p Speedwell circ 24   Coffee grounds in OG tube  -Slight decline in Hb post-op  Elevated troponin, likely demand ischemia  VT-ns  1:24 AM 10/14/24      Plan:  - Cont carvedilol for rate control BP. Follow BP as restarts PRN labetalol    - Cont statin   - Plavix restarted 10/8. Hb stable taking PO  monitor for bleeding (recent Watchman implant 2024) consider restarting ASA 81 if Hb con't to remain stable   - HD as per nephrology  - Monitor on tele  - Reviewed w/ wife and RN's  at bedside   - Not clear cause for sinus tachy possibly pain , hypotension  BB held rebound  , low volume. Hb has been stable.   - EF 55%  TERRI 24 restart BB  when able taking PO   - Dialysis tomorrow   - Rate control limited by NPO  and hypotension     Cardizem at 5 , Dig.5 x1, Amiodarone Gtt    D/W Dr Michel   - Plavix on hold for possible g-tube          Subjective:     Denies dyspnea or chest pain.   Pt became tachy during dialysis 10/14. 2 liters removed   No Palpitations racing     Complaints  of  L hip,  butt and foot pain    Currently  NPO  didn't pass  video swallow     Pt into A flutter ~ 1am today w/ RVR SBP 's       Patient Active Problem List   Diagnosis    Mixed hyperlipidemia    Pulmonary HTN (HCC)    KRAIG (obstructive sleep apnea)    Gout    Type 2 diabetes mellitus with chronic kidney disease on  chronic dialysis, with long-term current use of insulin (HCC)    Anemia of chronic renal failure    Chronic diastolic congestive heart failure (HCC)    Vitamin D deficiency    Chronic obstructive asthma (HCC)    Secondary hyperparathyroidism (HCC)    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)    Atherosclerosis of native arteries of extremities with intermittent claudication, bilateral legs (HCC)    Primary hypertension    Smokers' cough (HCC)    Unstable angina (HCC)    Lower GI bleed    ESRD (end stage renal disease) on dialysis (HCC)    PAF (paroxysmal atrial fibrillation) (HCC)    AVM (arteriovenous malformation) of colon    ABLA (acute blood loss anemia)    Rectal bleeding    Anticoagulated    Antiplatelet or antithrombotic long-term use    Lower GI bleeding    ESRD on hemodialysis (HCC)    GI bleed    Closed displaced midcervical fracture of left femur with delayed healing    ESRD (end stage renal disease) (HCC)    Closed fracture of left hip (HCC)    Hyperphosphatemia    Respiratory failure (HCC)    Anemia       Objective:   Temp: 98 °F (36.7 °C)  Pulse: 133  Resp: 18  BP: 97/69    Intake/Output:     Intake/Output Summary (Last 24 hours) at 10/17/2024 1639  Last data filed at 10/17/2024 0620  Gross per 24 hour   Intake 0 ml   Output 0 ml   Net 0 ml         Last 3 Weights   10/05/24 0600 144 lb 10 oz (65.6 kg)   10/04/24 0600 145 lb 8.1 oz (66 kg)   10/02/24 1310 156 lb 4.9 oz (70.9 kg)   10/02/24 0800 143 lb 11.8 oz (65.2 kg)   10/01/24 0600 160 lb 0.9 oz (72.6 kg)   09/30/24 0400 164 lb 0.4 oz (74.4 kg)   09/28/24 1649 155 lb 11.2 oz (70.6 kg)   08/22/24 1311 159 lb 12.8 oz (72.5 kg)   08/20/24 0933 158 lb (71.7 kg)       Tele: SR/PACs, sinus tach    Physical Exam:    General: Awake   HEENT: Normocephalic, anicteric sclera NG tube out   Neck: supple  Cardiac: Regular rhythm. S1, S2 normal. No murmur, pericardial rub, S3,  thrill, heave or extra cardiac sounds.  Lungs: Coarse breath sounds bilat  Abdomen: Soft, non-distended  Extremities: Without clubbing, cyanosis or edema.  SCD boots   Neurologic: Alert+confused   Skin: Warm and dry.     Laboratory/Data:    Labs:         Recent Labs   Lab 10/12/24  0332 10/13/24  0442 10/14/24  0427 10/16/24  0421 10/17/24  0412   WBC 20.5* 19.3* 19.1* 15.6* 14.6*   HGB 8.1* 8.5* 8.5* 9.4* 9.8*   MCV 96.3 93.6 96.1 94.9 100.6*   .0 452.0* 428.0 421.0 385.0       Recent Labs   Lab 10/13/24  0442 10/14/24  0427 10/15/24  1222 10/16/24  0421 10/17/24  0412    140 139 138 138   K 4.9 4.7 4.9 4.7 4.6    103 101 102 100   CO2 25.0 24.0 24.0 25.0 26.0   BUN 80* 112* 86* 97* 57*   CREATSERUM 6.45* 8.16* 7.54* 8.70* 6.37*   CA 10.3 10.1 10.0 10.1 9.9   MG  --  2.5  --  2.4 2.2   PHOS  --   --   --  7.6* 7.4*   * 183* 207* 122* 177*       Recent Labs   Lab 10/11/24  0404 10/17/24  0412   ALT <7* 19   AST 29 39*   ALB 3.5 3.8       No results for input(s): \"TROP\" in the last 168 hours.    Allergies:   Allergies   Allergen Reactions    Adhesive Tape OTHER (SEE COMMENTS)     Severe rashes    Dust Mite Extract RASH       Medications:  Current Facility-Administered Medications   Medication Dose Route Frequency    adult 3 in 1 TPN   Intravenous Continuous TPN    dilTIAZem 10 mg BOLUS FROM BAG infusion  10 mg Intravenous Q1H PRN    dilTIAZem (cardIZEM) 100 mg in sodium chloride 0.9% 100 mL IVPB-ADDV  2.5-20 mg/hr Intravenous Continuous    dilTIAZem (cardIZEM) tab 30 mg  30 mg Oral 4 times per day    pantoprazole (Protonix) 40 mg in sodium chloride 0.9% PF 10 mL IV push  40 mg Intravenous Q24H    sodium chloride 0.9 % IV bolus 250 mL  250 mL Intravenous Once    amiodarone (Cordarone) 150 mg in dextrose 5% 100 mL IV bolus  150 mg Intravenous Once    Followed by    amiodarone (Cordarone) 450 mg in dextrose 5% 250 mL infusion  0.5 mg/min Intravenous Continuous    dextrose 10% infusion (TPN no  rate)   Intravenous Continuous PRN    adult 3 in 1 TPN   Intravenous Continuous TPN    acetaminophen (Ofirmev) 10 mg/mL infusion premix 1,000 mg  1,000 mg Intravenous Q8H    heparin (Porcine) 1000 UNIT/ML injection 1,500 Units  1.5 mL Intravenous PRN Dialysis    lidocaine-prilocaine (Emla) 2.5-2.5 % cream   Topical PRN    sodium chloride 0.9 % IV bolus 100 mL  100 mL Intravenous Q30 Min PRN    And    albumin human (Albumin) 25% injection 25 g  25 g Intravenous PRN Dialysis    ipratropium-albuterol (Duoneb) 0.5-2.5 (3) MG/3ML inhalation solution 3 mL  3 mL Nebulization 2 times daily    ipratropium-albuterol (Duoneb) 0.5-2.5 (3) MG/3ML inhalation solution 3 mL  3 mL Nebulization Q6H PRN    influenza virus trivalent high dose PF (Fluzone HD) 0.5 mL injection (ages >/= 65 years) 0.5 mL  0.5 mL Intramuscular Prior to discharge    clopidogrel (Plavix) tab 75 mg  75 mg Oral Daily    carvedilol (Coreg) tab 25 mg  25 mg Oral BID    sevelamer carbonate (Renvela) tab 800 mg  800 mg Oral TID CC    insulin aspart (NovoLOG) 100 Units/mL FlexPen 1-7 Units  1-7 Units Subcutaneous TID CC and HS    atorvastatin (Lipitor) tab 40 mg  40 mg Oral Nightly    HYDROmorphone (Dilaudid) 1 MG/ML injection 0.1 mg  0.1 mg Intravenous Q2H PRN    Or    HYDROmorphone (Dilaudid) 1 MG/ML injection 0.2 mg  0.2 mg Intravenous Q2H PRN    Or    HYDROmorphone (Dilaudid) 1 MG/ML injection 0.4 mg  0.4 mg Intravenous Q2H PRN    epoetin donna (Epogen, Procrit) 5,000 Units injection  5,000 Units Subcutaneous Once per day on Monday Wednesday Friday    metoclopramide (Reglan) 5 mg/mL injection 10 mg  10 mg Intravenous Q24H PRN    sodium chloride 0.9 % irrigation    Continuous PRN    sodium chloride 0.9 % irrigation    Continuous PRN    ondansetron (Zofran) 4 MG/2ML injection 4 mg  4 mg Intravenous Q6H PRN    diphenhydrAMINE (Benadryl) cap/tab 25 mg  25 mg Oral Q4H PRN    Or    diphenhydrAMINE (Benadryl) 50 mg/mL  injection 12.5 mg  12.5 mg Intravenous Q4H PRN     heparin (Porcine) 5000 UNIT/ML injection 5,000 Units  5,000 Units Subcutaneous Q8H STEPHANIE    dextrose 10% infusion (TPN no rate)   Intravenous Continuous PRN    pancrelipase (Lip-Prot-Amyl) (Zenpep) DR particles cap 10,000 Units  10,000 Units Per G Tube PRN    And    sodium bicarbonate tab 325 mg  325 mg Oral PRN    senna (Senokot) 8.8 MG/5ML oral syrup 17.6 mg  10 mL Oral BID    mineral oil-white petrolatum (Artificial Tears) 83-15 % ophthalmic ointment 1 Application  1 Application Both Eyes Nightly    lidocaine-menthol 4-1 % patch 2 patch  2 patch Transdermal Daily    hydrALAzine (Apresoline) 20 mg/mL injection 10 mg  10 mg Intravenous Q6H PRN    labetalol (Trandate) 5 mg/mL injection 10 mg  10 mg Intravenous Q4H PRN    acetaminophen (Tylenol) 160 MG/5ML oral liquid 500 mg  500 mg Per NG Tube Daily PRN    acetaminophen (Tylenol) tab 650 mg  650 mg Oral Q4H PRN    Or    acetaminophen (Tylenol) 160 MG/5ML oral liquid 650 mg  650 mg Per NG Tube Q4H PRN    Or    acetaminophen (Tylenol) rectal suppository 650 mg  650 mg Rectal Q4H PRN    polyethylene glycol (PEG 3350) (Miralax) 17 g oral packet 17 g  17 g Per NG Tube Daily PRN    bisacodyl (Dulcolax) 10 MG rectal suppository 10 mg  10 mg Rectal Daily PRN    albuterol (Ventolin HFA) 108 (90 Base) MCG/ACT inhaler 2 puff  2 puff Inhalation Q4H PRN    fluticasone propionate (Flonase) 50 MCG/ACT nasal suspension 2 spray  2 spray Nasal Daily PRN    glucose (Dex4) 15 GM/59ML oral liquid 15 g  15 g Oral Q15 Min PRN    Or    glucose (Glutose) 40% oral gel 15 g  15 g Oral Q15 Min PRN    Or    glucose-vitamin C (Dex-4) chewable tab 4 tablet  4 tablet Oral Q15 Min PRN    Or    dextrose 50% injection 50 mL  50 mL Intravenous Q15 Min PRN    Or    glucose (Dex4) 15 GM/59ML oral liquid 30 g  30 g Oral Q15 Min PRN    Or    glucose (Glutose) 40% oral gel 30 g  30 g Oral Q15 Min PRN    Or    glucose-vitamin C (Dex-4) chewable tab 8 tablet  8 tablet Oral Q15 Min PRN    fluticasone-salmeterol  (Advair Diskus) 250-50 MCG/ACT inhaler 1 puff  1 puff Inhalation BID

## 2024-10-18 ENCOUNTER — APPOINTMENT (OUTPATIENT)
Dept: GENERAL RADIOLOGY | Facility: HOSPITAL | Age: 79
End: 2024-10-18
Payer: MEDICARE

## 2024-10-18 PROBLEM — Z51.5 PALLIATIVE CARE BY SPECIALIST: Status: ACTIVE | Noted: 2024-01-01

## 2024-10-18 PROBLEM — Z51.5 PALLIATIVE CARE BY SPECIALIST: Status: ACTIVE | Noted: 2024-10-18

## 2024-10-18 LAB
ANION GAP SERPL CALC-SCNC: 14 MMOL/L (ref 0–18)
BUN BLD-MCNC: 72 MG/DL (ref 9–23)
BUN/CREAT SERPL: 9.4 (ref 10–20)
CALCIUM BLD-MCNC: 9.4 MG/DL (ref 8.7–10.4)
CHLORIDE SERPL-SCNC: 100 MMOL/L (ref 98–112)
CO2 SERPL-SCNC: 19 MMOL/L (ref 21–32)
CREAT BLD-MCNC: 7.66 MG/DL
DEPRECATED RDW RBC AUTO: 63.1 FL (ref 35.1–46.3)
EGFRCR SERPLBLD CKD-EPI 2021: 7 ML/MIN/1.73M2 (ref 60–?)
ERYTHROCYTE [DISTWIDTH] IN BLOOD BY AUTOMATED COUNT: 16.5 % (ref 11–15)
GLUCOSE BLD-MCNC: 203 MG/DL (ref 70–99)
GLUCOSE BLDC GLUCOMTR-MCNC: 148 MG/DL (ref 70–99)
GLUCOSE BLDC GLUCOMTR-MCNC: 165 MG/DL (ref 70–99)
GLUCOSE BLDC GLUCOMTR-MCNC: 188 MG/DL (ref 70–99)
GLUCOSE BLDC GLUCOMTR-MCNC: 215 MG/DL (ref 70–99)
HCT VFR BLD AUTO: 31.2 %
HGB BLD-MCNC: 9.5 G/DL
MAGNESIUM SERPL-MCNC: 2.1 MG/DL (ref 1.6–2.6)
MCH RBC QN AUTO: 31.9 PG (ref 26–34)
MCHC RBC AUTO-ENTMCNC: 30.4 G/DL (ref 31–37)
MCV RBC AUTO: 104.7 FL
OSMOLALITY SERPL CALC.SUM OF ELEC: 303 MOSM/KG (ref 275–295)
PHOSPHATE SERPL-MCNC: 6.4 MG/DL (ref 2.4–5.1)
PLATELET # BLD AUTO: 255 10(3)UL (ref 150–450)
PLATELETS.RETICULATED NFR BLD AUTO: 3.9 % (ref 0–7)
POTASSIUM SERPL-SCNC: 4.2 MMOL/L (ref 3.5–5.1)
PROCALCITONIN SERPL-MCNC: 7.58 NG/ML (ref ?–0.05)
RBC # BLD AUTO: 2.98 X10(6)UL
SODIUM SERPL-SCNC: 133 MMOL/L (ref 136–145)
WBC # BLD AUTO: 17 X10(3) UL (ref 4–11)

## 2024-10-18 PROCEDURE — 99233 SBSQ HOSP IP/OBS HIGH 50: CPT | Performed by: HOSPITALIST

## 2024-10-18 PROCEDURE — 99232 SBSQ HOSP IP/OBS MODERATE 35: CPT | Performed by: INTERNAL MEDICINE

## 2024-10-18 PROCEDURE — 99221 1ST HOSP IP/OBS SF/LOW 40: CPT | Performed by: INTERNAL MEDICINE

## 2024-10-18 PROCEDURE — 73502 X-RAY EXAM HIP UNI 2-3 VIEWS: CPT

## 2024-10-18 NOTE — CONSULTS
Palliative Care Initial Inpatient Consult    Ollie Hernández Patient Status:  Inpatient    1945 MRN [id number]   Location [facility] 3W/SW Attending Pranay Michel MD   Hosp Day # 20 PCP Wili Parmar MD     Date of Consult: 10/18/2024  Patient seen at: [facility]    Reason for Consultation: Consult requested for goals of care and care coordination.    Subjective     History of Present Illness: Ollie Hernández is a 79 year old male with history of multiple medical comorbid conditions who was admitted on 2024 for a femur fracture. His course was complicated by respiratory failure requiring intubation and demand ischemia/new onset afib.  History was obtained from [facility] and patient/family interview.  Our palliative care service was asked to evaluate the patient for a goals of care discussion and symptom management.      Review of Systems: Palliative Care symptom needs assessed:     denies dypsnea.   denies cough or lightheadedness/dizziness.   See below for current pain issues.   normal appetite  no n/v and moved bowels last ytd.   denies constipation/diarrhea issues.   denies depression or anxiety.      Palliative Care Social History:   Marital Status:   Children: Yes  Living Situation Prior to Admit: Home  Occupational History: Retired  Personal:     Spiritual  Ollie Hernández  Anglican     requested: No    Medical History: obtained from [facility]  Past Medical History:    Anemia    Asthma (HCC)    Back problem    BPH (benign prostatic hyperplasia)    Calculus of kidney    Cataract    Coronary atherosclerosis    Diabetes (HCC)    ESRD (end stage renal disease) on dialysis (HCC)    Essential hypertension    High blood pressure    High cholesterol    History of blood transfusion    Hyperlipidemia    Neuropathy    hands and feet    KRAIG on CPAP    Renal disorder    Sleep apnea    Visual impairment    glasses    Vocal cord paralysis, unilateral partial     Past  Surgical History:   Procedure Laterality Date    Appendectomy      1981    Back surgery      Neck/back - 1998    Capsule endoscopy - internal referral      Cataract extraction Right 01/08/2022    PHACOEMULSIFICATION WITH POSTERIOR CHAMBER INTRAOCULAR LENS, RIGHT EYE    Cataract extraction Left 12/21/2021    PHACOEMULSIFICATION WITH POSTERIOR CHAMBER INTRAOCULAR LENS, LEFT EYE    Cath bare metal stent (bms)      Cath drug eluting stent  05/21/2024    Successful IVUS guided PCI of the circumflex with a ABIMBOLA    Colonoscopy      Colonoscopy N/A 01/25/2021    Procedure: COLONOSCOPY;  Surgeon: Michael Gautam MD;  Location: [facility] ENDO    Colonoscopy N/A 06/03/2024    Procedure: COLONOSCOPY;  Surgeon: Gabriel Saldana MD;  Location: [facility] ENDOSCOPY    Colonoscopy N/A 06/15/2024    Procedure: COLONOSCOPY;  Surgeon: Carlyn Storey MD;  Location: [facility] ENDOSCOPY    Colonoscopy N/A 07/31/2024    Procedure: COLONOSCOPY;  Surgeon: Gabriel Saldana MD;  Location: [facility] ENDOSCOPY    Dialysis procedure  02/17/2023    right internal jugular tunneled dialysis catheter placement.    Hand/finger surgery unlisted      Accidental trauma    Spine surgery procedure unlisted      Total hip arthroplasty Left 10/04/2024    Left anterior total hip arthroplasty    Upper gi endoscopy,diagnosis         Family History: obtained from [facility]  Family History   Problem Relation Age of Onset    Cancer Father         Kidney    Breast Cancer Mother     Diabetes Mother     Diabetes Maternal Grandmother     Diabetes Maternal Grandfather     Heart Disorder Other         Uncle       Allergies:  Allergies[1]    Medications:     Current Facility-Administered Medications:     adult 3 in 1 TPN, , Intravenous, Continuous TPN    adult 3 in 1 TPN, , Intravenous, Continuous TPN    dilTIAZem 10 mg BOLUS FROM BAG infusion, 10 mg, Intravenous, Q1H PRN    dilTIAZem (cardIZEM) 100 mg in sodium chloride 0.9% 100 mL IVPB-ADDV, 2.5-20 mg/hr, Intravenous, Continuous    dilTIAZem  (cardIZEM) tab 30 mg, 30 mg, Oral, 4 times per day    pantoprazole (Protonix) 40 mg in sodium chloride 0.9% PF 10 mL IV push, 40 mg, Intravenous, Q24H    [COMPLETED] amiodarone (Cordarone) 150 mg in dextrose 5% 100 mL IV bolus, 150 mg, Intravenous, Once **FOLLOWED BY** amiodarone (Cordarone) 450 mg in dextrose 5% 250 mL infusion, 0.5 mg/min, Intravenous, Continuous **FOLLOWED BY** [DISCONTINUED] amiodarone (Cordarone) 450 mg in dextrose 5% 250 mL infusion, 0.5 mg/min, Intravenous, Continuous    heparin (Porcine) 1000 UNIT/ML injection 1,500 Units, 1.5 mL, Intravenous, PRN Dialysis    lidocaine-prilocaine (Emla) 2.5-2.5 % cream, , Topical, PRN    sodium chloride 0.9 % IV bolus 100 mL, 100 mL, Intravenous, Q30 Min PRN **AND** albumin human (Albumin) 25% injection 25 g, 25 g, Intravenous, PRN Dialysis    dextrose 10% infusion (TPN no rate), , Intravenous, Continuous PRN    acetaminophen (Ofirmev) 10 mg/mL infusion premix 1,000 mg, 1,000 mg, Intravenous, Q8H    heparin (Porcine) 1000 UNIT/ML injection 1,500 Units, 1.5 mL, Intravenous, PRN Dialysis    lidocaine-prilocaine (Emla) 2.5-2.5 % cream, , Topical, PRN    ipratropium-albuterol (Duoneb) 0.5-2.5 (3) MG/3ML inhalation solution 3 mL, 3 mL, Nebulization, 2 times daily    ipratropium-albuterol (Duoneb) 0.5-2.5 (3) MG/3ML inhalation solution 3 mL, 3 mL, Nebulization, Q6H PRN    influenza virus trivalent high dose PF (Fluzone HD) 0.5 mL injection (ages >/= 65 years) 0.5 mL, 0.5 mL, Intramuscular, Prior to discharge    clopidogrel (Plavix) tab 75 mg, 75 mg, Oral, Daily    [Held by provider] carvedilol (Coreg) tab 25 mg, 25 mg, Oral, BID    sevelamer carbonate (Renvela) tab 800 mg, 800 mg, Oral, TID CC    insulin aspart (NovoLOG) 100 Units/mL FlexPen 1-7 Units, 1-7 Units, Subcutaneous, TID CC and HS    atorvastatin (Lipitor) tab 40 mg, 40 mg, Oral, Nightly    HYDROmorphone (Dilaudid) 1 MG/ML injection 0.1 mg, 0.1 mg, Intravenous, Q2H PRN **OR** HYDROmorphone (Dilaudid) 1  MG/ML injection 0.2 mg, 0.2 mg, Intravenous, Q2H PRN **OR** HYDROmorphone (Dilaudid) 1 MG/ML injection 0.4 mg, 0.4 mg, Intravenous, Q2H PRN    epoetin donna (Epogen, Procrit) 5,000 Units injection, 5,000 Units, Subcutaneous, Once per day on Monday Wednesday Friday    metoclopramide (Reglan) 5 mg/mL injection 10 mg, 10 mg, Intravenous, Q24H PRN    sodium chloride 0.9 % irrigation, , , Continuous PRN    sodium chloride 0.9 % irrigation, , , Continuous PRN    ondansetron (Zofran) 4 MG/2ML injection 4 mg, 4 mg, Intravenous, Q6H PRN    diphenhydrAMINE (Benadryl) cap/tab 25 mg, 25 mg, Oral, Q4H PRN **OR** diphenhydrAMINE (Benadryl) 50 mg/mL  injection 12.5 mg, 12.5 mg, Intravenous, Q4H PRN    heparin (Porcine) 5000 UNIT/ML injection 5,000 Units, 5,000 Units, Subcutaneous, Q8H STEPHANIE    dextrose 10% infusion (TPN no rate), , Intravenous, Continuous PRN    pancrelipase (Lip-Prot-Amyl) (Zenpep) DR particles cap 10,000 Units, 10,000 Units, Per G Tube, PRN **AND** sodium bicarbonate tab 325 mg, 325 mg, Oral, PRN    senna (Senokot) 8.8 MG/5ML oral syrup 17.6 mg, 10 mL, Oral, BID    mineral oil-white petrolatum (Artificial Tears) 83-15 % ophthalmic ointment 1 Application, 1 Application, Both Eyes, Nightly    lidocaine-menthol 4-1 % patch 2 patch, 2 patch, Transdermal, Daily    hydrALAzine (Apresoline) 20 mg/mL injection 10 mg, 10 mg, Intravenous, Q6H PRN    labetalol (Trandate) 5 mg/mL injection 10 mg, 10 mg, Intravenous, Q4H PRN    acetaminophen (Tylenol) 160 MG/5ML oral liquid 500 mg, 500 mg, Per NG Tube, Daily PRN    acetaminophen (Tylenol) tab 650 mg, 650 mg, Oral, Q4H PRN **OR** acetaminophen (Tylenol) 160 MG/5ML oral liquid 650 mg, 650 mg, Per NG Tube, Q4H PRN **OR** acetaminophen (Tylenol) rectal suppository 650 mg, 650 mg, Rectal, Q4H PRN **OR** [DISCONTINUED] acetaminophen (Ofirmev) 10 mg/mL infusion premix 1,000 mg, 1,000 mg, Intravenous, Q6H PRN    polyethylene glycol (PEG 3350) (Miralax) 17 g oral packet 17 g, 17 g, Per  NG Tube, Daily PRN    bisacodyl (Dulcolax) 10 MG rectal suppository 10 mg, 10 mg, Rectal, Daily PRN    albuterol (Ventolin HFA) 108 (90 Base) MCG/ACT inhaler 2 puff, 2 puff, Inhalation, Q4H PRN    fluticasone propionate (Flonase) 50 MCG/ACT nasal suspension 2 spray, 2 spray, Nasal, Daily PRN    glucose (Dex4) 15 GM/59ML oral liquid 15 g, 15 g, Oral, Q15 Min PRN **OR** glucose (Glutose) 40% oral gel 15 g, 15 g, Oral, Q15 Min PRN **OR** glucose-vitamin C (Dex-4) chewable tab 4 tablet, 4 tablet, Oral, Q15 Min PRN **OR** dextrose 50% injection 50 mL, 50 mL, Intravenous, Q15 Min PRN **OR** glucose (Dex4) 15 GM/59ML oral liquid 30 g, 30 g, Oral, Q15 Min PRN **OR** glucose (Glutose) 40% oral gel 30 g, 30 g, Oral, Q15 Min PRN **OR** glucose-vitamin C (Dex-4) chewable tab 8 tablet, 8 tablet, Oral, Q15 Min PRN    fluticasone-salmeterol (Advair Diskus) 250-50 MCG/ACT inhaler 1 puff, 1 puff, Inhalation, BID  No current outpatient medications on file.         Palliative Performance Scale: 40 %  % Ambulation Activity Level Self-Care Intake Consciousness   100 Full  Normal  No Disease Full Normal Full   90 Full  Normal  Some Disease Full Normal Full   80 Full  Normal w/effort  Some Disease Full Normal or reduced Full   70 Reduced  Can't Perform Job  Some Disease Full Normal or reduced Full   60 Reduced  Can't Perform Hobby   Significant Disease Occ Assist Normal or reduced Full or confused   50 Mainly sit/lie Can't do any work  Extensive Disease Partial Assist Normal or reduced Full or confused   40 Mainly in bed Can't do any work  Extensive Disease Mainly Assist Normal or reduced Full or confused   30 Bed Bound Can't do any work  Extensive Disease Max Assist  Total Care Reduced  Drowsy/confused   20 Bed Bound Can't do any work  Extensive Disease Max Assist  Total Care Minimal  Drowsy/confused   10 Bed Bound Can't do any work  Extensive Disease Max Assist  Total Care Mouth Care  Drowsy/confused   0 Death        Objective       Vital Signs:  Blood pressure 155/51, pulse 68, temperature 98.1 °F (36.7 °C), temperature source Oral, resp. rate 20, height 5' 4.02\" (1.626 m), weight 131 lb 1.6 oz (59.5 kg), SpO2 98%.  Body mass index is 22.49 kg/m².  Non-verbal signs of pain present: No    Physical Exam:  General: Alert, awake and in no  apparent respiratory distress.  HEENT: AT/NC. No focal deficits.   Cardiac: RRR  Lungs: Normal effort on RA  Abdomen: Soft, non-tender, non-distended, normal bowel sounds X 4 quadrants   Extremities: FROM of all limbs, no  Edema present  Skin: Warm and dry.    Hematology:  Lab Results   Component Value Date    WBC 17.0 (H) 10/18/2024    HGB 9.5 (L) 10/18/2024    HCT 31.2 (L) 10/18/2024    .0 10/18/2024       Coags:  Lab Results   Component Value Date    INR 1.45 (H) 08/09/2024    PTT 30.1 08/09/2024       Chemistry:  Lab Results   Component Value Date    CREATSERUM 7.66 (H) 10/18/2024    BUN 72 (H) 10/18/2024     (L) 10/18/2024    K 4.2 10/18/2024     10/18/2024    CO2 19.0 (L) 10/18/2024     (H) 10/18/2024    CA 9.4 10/18/2024    ALB 3.8 10/17/2024    ALKPHO 103 10/17/2024    BILT 0.4 10/17/2024    TP 7.2 10/17/2024    AST 39 (H) 10/17/2024    ALT 19 10/17/2024    PSA 2.94 10/20/2021    DDIMER 6.07 (H) 09/29/2024    MG 2.1 10/18/2024    PHOS 6.4 (H) 10/18/2024    TROP <0.045 07/25/2019       Imaging:  XR HIP W OR WO PELVIS 2 OR 3 VIEWS, LEFT (CPT=73502)    Result Date: 10/18/2024  CONCLUSION:  1. Anatomically aligned left total hip arthroplasty.  No fracture other acute complication. 2. Degenerative changes in the lower lumbar spine and SI joints. 3. Atherosclerotic vascular calcification.    Dictated by (CST): Bennie Cuadra MD on 10/18/2024 at 3:36 PM     Finalized by (CST): Bennie Cuadra MD on 10/18/2024 at 3:42 PM           Assessment and Recommendations        Closed displaced midcervical fracture of left femur with delayed healing    ESRD (end stage renal disease) ([facility])     Closed fracture of left hip (HCC)    Hyperphosphatemia    Respiratory failure (HCC)    Anemia    Symptom Management      Pain:  -relating to L hip fx  -cont IV dilaudid  -recommend short course of oral dilaudid one tolerating oral or GI intake     Prevention of Constipation:    - Recommend Senna-S 8.6mg (2) tabs BID Scheduled   - Recommend Miralax 17g daily Scheduled   - Recommend Dulcolax suppository daily PRN    2.     Serious Illness Conversation:   Code Status: Full  POA: Surrogate is wife Stephy  I met with Ollie along with his daugther to discuss goals of care and symptoms. Ollie has failed various swallow evaluations and has consented to PEG placement IF tomorrow's follow up study is also unsuccessful.  We talked a bit about a feeding tube as well as his dialysis dependence and his course in the ICU. He affirms that he is motivated to work toward physical recovery as much as he can and that he is willing to undergo invasive procedures if they are reasonable and expected to achieve his goal of either functional improvement or prolonged life.  I validated his wishes and expressed my hope for his recovery.   I am available to discuss his symptom mgmt or goals of care at any point and appreciate meeting him and his family today.       Palliative Care Follow Up: Palliative care team will sign off at this time. Feel free to contact our team with any questions or concerns    Thank you for allowing Palliative Care services to participate in the care of Ollie Hernández.    A total of 50 minutes were spent on this visit, which included all of the following: direct face to face contact, history taking, physical examination, and >50% was spent counseling and coordinating care.    José Pichardo MD  10/18/2024  5:36 PM  Palliative Care Services  [facility] [phone]    Note to patient:  The 21st Century Cures Act makes medical notes like these available to patients in the interest of transparency.  However, be advised this is a medical document. It is intended as peer to peer communication. It is written in medical language and may contain abbreviations or verbiage that are unfamiliar. It may appear blunt or direct. Medical documents are intended to carry relevant information, facts as evident, and the clinical opinion of the practitioner.           [1]   Allergies  Allergen Reactions    Adhesive Tape OTHER (SEE COMMENTS)     Severe rashes    Dust Mite Extract RASH

## 2024-10-18 NOTE — PLAN OF CARE
Problem: Patient Centered Care  Goal: Patient preferences are identified and integrated in the patient's plan of care  Description: Interventions:  - What would you like us to know as we care for you? Home with family  - Provide timely, complete, and accurate information to patient/family  - Incorporate patient and family knowledge, values, beliefs, and cultural backgrounds into the planning and delivery of care  - Encourage patient/family to participate in care and decision-making at the level they choose  - Honor patient and family perspectives and choices  Outcome: Progressing     Problem: Diabetes/Glucose Control  Goal: Glucose maintained within prescribed range  Description: INTERVENTIONS:  - Monitor Blood Glucose as ordered  - Assess for signs and symptoms of hyperglycemia and hypoglycemia  - Administer ordered medications to maintain glucose within target range  - Assess barriers to adequate nutritional intake and initiate nutrition consult as needed  - Instruct patient on self management of diabetes  Outcome: Progressing     Problem: Patient/Family Goals  Goal: Patient/Family Long Term Goal  Description: Patient's Long Term Goal: improve kidney function    Interventions:  - HD  -follow plan of care  - See additional Care Plan goals for specific interventions  Outcome: Progressing  Goal: Patient/Family Short Term Goal  Description: Patient's Short Term Goal: home    Interventions:   - follow plan of care  - See additional Care Plan goals for specific interventions  Outcome: Progressing     Problem: PAIN - ADULT  Goal: Verbalizes/displays adequate comfort level or patient's stated pain goal  Description: INTERVENTIONS:  - Encourage pt to monitor pain and request assistance  - Assess pain using appropriate pain scale  - Administer analgesics based on type and severity of pain and evaluate response  - Implement non-pharmacological measures as appropriate and evaluate response  - Consider cultural and social  influences on pain and pain management  - Manage/alleviate anxiety  - Utilize distraction and/or relaxation techniques  - Monitor for opioid side effects  - Notify MD/LIP if interventions unsuccessful or patient reports new pain  - Anticipate increased pain with activity and pre-medicate as appropriate  Outcome: Progressing     Problem: SAFETY ADULT - FALL  Goal: Free from fall injury  Description: INTERVENTIONS:  - Assess pt frequently for physical needs  - Identify cognitive and physical deficits and behaviors that affect risk of falls.  - [place] fall precautions as indicated by assessment.  - Educate pt/family on patient safety including physical limitations  - Instruct pt to call for assistance with activity based on assessment  - Modify environment to reduce risk of injury  - Provide assistive devices as appropriate  - Consider OT/PT consult to assist with strengthening/mobility  - Encourage toileting schedule  Outcome: Progressing     Problem: DISCHARGE PLANNING  Goal: Discharge to home or other facility with appropriate resources  Description: INTERVENTIONS:  - Identify barriers to discharge w/pt and caregiver  - Include patient/family/discharge partner in discharge planning  - Arrange for needed discharge resources and transportation as appropriate  - Identify discharge learning needs (meds, wound care, etc)  - Arrange for interpreters to assist at discharge as needed  - Consider post-discharge preferences of patient/family/discharge partner  - Complete POLST form as appropriate  - Assess patient's ability to be responsible for managing their own health  - Refer to Case Management Department for coordinating discharge planning if the patient needs post-hospital services based on physician/LIP order or complex needs related to functional status, cognitive ability or social support system  Outcome: Progressing     Problem: CARDIOVASCULAR - ADULT  Goal: Maintains optimal cardiac output and hemodynamic  stability  Description: INTERVENTIONS:  - Monitor vital signs, rhythm, and trends  - Monitor for bleeding, hypotension and signs of decreased cardiac output  - Evaluate effectiveness of vasoactive medications to optimize hemodynamic stability  - Monitor arterial and/or venous puncture sites for bleeding and/or hematoma  - Assess quality of pulses, skin color and temperature  - Assess for signs of decreased coronary artery perfusion - ex. Angina  - Evaluate fluid balance, assess for edema, trend weights  Outcome: Progressing  Goal: Absence of cardiac arrhythmias or at baseline  Description: INTERVENTIONS:  - Continuous cardiac monitoring, monitor vital signs, obtain 12 lead EKG if indicated  - Evaluate effectiveness of antiarrhythmic and heart rate control medications as ordered  - Initiate emergency measures for life threatening arrhythmias  - Monitor electrolytes and administer replacement therapy as ordered  Outcome: Progressing     Problem: RESPIRATORY - ADULT  Goal: Achieves optimal ventilation and oxygenation  Description: INTERVENTIONS:  - Assess for changes in respiratory status  - Assess for changes in mentation and behavior  - Position to facilitate oxygenation and minimize respiratory effort  - Oxygen supplementation based on oxygen saturation or ABGs  - Provide Smoking Cessation handout, if applicable  - Encourage broncho-pulmonary hygiene including cough, deep breathe, Incentive Spirometry  - Assess the need for suctioning and perform as needed  - Assess and instruct to report SOB or any respiratory difficulty  - Respiratory Therapy support as indicated  - Manage/alleviate anxiety  - Monitor for signs/symptoms of CO2 retention  Outcome: Progressing     Problem: GASTROINTESTINAL - ADULT  Goal: Maintains or returns to baseline bowel function  Description: INTERVENTIONS:  - Assess bowel function  - Maintain adequate hydration with IV or PO as ordered and tolerated  - Evaluate effectiveness of GI  medications  - Encourage mobilization and activity  - Obtain nutritional consult as needed  - Establish a toileting routine/schedule  - Consider collaborating with pharmacy to review patient's medication profile  Outcome: Progressing

## 2024-10-18 NOTE — PROGRESS NOTES
[facility]      [facility] Cardiology Progress Note    Ollie Hernández Patient Status:  Inpatient    1945 MRN [id number]   Location [facility] 2W/SW Attending Pranay Michel MD   Hosp Day # 20 PCP Wili Parmar MD       Impression/Plan:  79 year old male presenting with:    Impression:  Hip fracture Left - S/P L anterior total hip arthroplasty 10/4/24  Acute resp failure, aspiration and/or pulmonary edema; intubated 2024 extubated 10/6  -Failed weaning trial yesterday  ESRD on HD  DM  PAF, currently SR. S/p watchman device 24  HTN  HL, on statin PTA  Acute on chronic diastolic CHF  CAD s/p [place] circ 24   Coffee grounds in OG tube  -Slight decline in Hb post-op  Elevated troponin, likely demand ischemia  VT-ns  1:24 AM 10/14/24      Plan:  - Cont carvedilol for rate control BP. Follow BP as restarts PRN labetalol    - Cont statin   - Plavix restarted 10/8. Hb stable taking PO  monitor for bleeding (recent Watchman implant 2024) consider restarting ASA 81 if Hb con't to remain stable     - Monitor on tele  - Reviewed w/ wife and RN's  at bedside   - Not clear cause for sinus tachy possibly pain , hypotension  BB held rebound  , low volume. Hb has been stable.   - EF 55%  TERRI 24 restart BB  when able taking PO   - Dialysis today     - Med Rx  limited by NPO  and hypotension     Con't , Amiodarone Gtt @  .5 hr consider  per G tube if able     D/W Dr Michel      Hold Diltiazem and  Dig.   - Plavix on hold for possible g-tube restart when able for recent stent          Subjective:     Denies dyspnea or chest pain.   Pt became tachy during dialysis 10/14. 2 liters removed   No Palpitations racing     Complaints  of  L hip,  butt and foot pain    Currently  NPO  didn't pass  video swallow     Pt into A flutter ~ 1am 10/17 w/ RVR SBP 's    ~ 7:30PM  back to NSR  Feeling good no CP /SOB       Patient Active Problem List   Diagnosis    Mixed hyperlipidemia    Pulmonary  HTN (HCC)    KRAIG (obstructive sleep apnea)    Gout    Type 2 diabetes mellitus with chronic kidney disease on chronic dialysis, with long-term current use of insulin (HCC)    Anemia of chronic renal failure    Chronic diastolic congestive heart failure (HCC)    Vitamin D deficiency    Chronic obstructive asthma (HCC)    Secondary hyperparathyroidism (HCC)    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)    Atherosclerosis of native arteries of extremities with intermittent claudication, bilateral legs (HCC)    Primary hypertension    Smokers' cough (HCC)    Unstable angina (HCC)    Lower GI bleed    ESRD (end stage renal disease) on dialysis (HCC)    PAF (paroxysmal atrial fibrillation) (HCC)    AVM (arteriovenous malformation) of colon    ABLA (acute blood loss anemia)    Rectal bleeding    Anticoagulated    Antiplatelet or antithrombotic long-term use    Lower GI bleeding    ESRD on hemodialysis (HCC)    GI bleed    Closed displaced midcervical fracture of left femur with delayed healing    ESRD (end stage renal disease) (HCC)    Closed fracture of left hip (HCC)    Hyperphosphatemia    Respiratory failure (HCC)    Anemia       Objective:   Temp: 97.8 °F (36.6 °C)  Pulse: 66  Resp: 20  BP: 158/60    Intake/Output:     Intake/Output Summary (Last 24 hours) at 10/18/2024 0607  Last data filed at 10/18/2024 0400  Gross per 24 hour   Intake 100 ml   Output 0 ml   Net 100 ml         Last 3 Weights   10/18/24 0441 131 lb 1.6 oz (59.5 kg)   10/05/24 0600 144 lb 10 oz (65.6 kg)   10/04/24 0600 145 lb 8.1 oz (66 kg)   10/02/24 1310 156 lb 4.9 oz (70.9 kg)   10/02/24 0800 143 lb 11.8 oz (65.2 kg)   10/01/24 0600 160 lb 0.9 oz (72.6 kg)   09/30/24 0400 164 lb 0.4 oz (74.4 kg)   09/28/24 1649 155 lb 11.2 oz (70.6 kg)   08/22/24 1311 159 lb 12.8 oz (72.5 kg)   08/20/24 0933 158 lb (71.7 kg)       Tele:    A Flutter > NSR     Physical  Exam:    General: Awake   HEENT: Normocephalic, anicteric sclera NG tube out   Neck: supple  Cardiac: Regular rhythm. S1, S2 normal. No murmur, pericardial rub, S3, thrill, heave or extra cardiac sounds.  Lungs: Coarse breath sounds bilat  Abdomen: Soft, non-distended  Extremities: Without clubbing, cyanosis or edema.  SCD boots Missing digits R hand   Neurologic: Alert+confused   Skin: Warm and dry.     Laboratory/Data:    Labs:         Recent Labs   Lab 10/12/24  0332 10/13/24  0442 10/14/24  0427 10/16/24  0421 10/17/24  0412   WBC 20.5* 19.3* 19.1* 15.6* 14.6*   HGB 8.1* 8.5* 8.5* 9.4* 9.8*   MCV 96.3 93.6 96.1 94.9 100.6*   .0 452.0* 428.0 421.0 385.0       Recent Labs   Lab 10/13/24  0442 10/14/24  0427 10/15/24  1222 10/16/24  0421 10/17/24  0412    140 139 138 138   K 4.9 4.7 4.9 4.7 4.6    103 101 102 100   CO2 25.0 24.0 24.0 25.0 26.0   BUN 80* 112* 86* 97* 57*   CREATSERUM 6.45* 8.16* 7.54* 8.70* 6.37*   CA 10.3 10.1 10.0 10.1 9.9   MG  --  2.5  --  2.4 2.2   PHOS  --   --   --  7.6* 7.4*   * 183* 207* 122* 177*       Recent Labs   Lab 10/17/24  0412   ALT 19   AST 39*   ALB 3.8       No results for input(s): \"TROP\" in the last 168 hours.    Allergies:   Allergies   Allergen Reactions    Adhesive Tape OTHER (SEE COMMENTS)     Severe rashes    Dust Mite Extract RASH       Medications:  Current Facility-Administered Medications   Medication Dose Route Frequency    adult 3 in 1 TPN   Intravenous Continuous TPN    dilTIAZem 10 mg BOLUS FROM BAG infusion  10 mg Intravenous Q1H PRN    dilTIAZem (cardIZEM) 100 mg in sodium chloride 0.9% 100 mL IVPB-ADDV  2.5-20 mg/hr Intravenous Continuous    dilTIAZem (cardIZEM) tab 30 mg  30 mg Oral 4 times per day    pantoprazole (Protonix) 40 mg in sodium chloride 0.9% PF 10 mL IV push  40 mg Intravenous Q24H    amiodarone (Cordarone) 450 mg in dextrose 5% 250 mL infusion  0.5 mg/min Intravenous Continuous    heparin (Porcine) 1000 UNIT/ML injection  1,500 Units  1.5 mL Intravenous PRN Dialysis    lidocaine-prilocaine (Emla) 2.5-2.5 % cream   Topical PRN    sodium chloride 0.9 % IV bolus 100 mL  100 mL Intravenous Q30 Min PRN    And    albumin human (Albumin) 25% injection 25 g  25 g Intravenous PRN Dialysis    dextrose 10% infusion (TPN no rate)   Intravenous Continuous PRN    acetaminophen (Ofirmev) 10 mg/mL infusion premix 1,000 mg  1,000 mg Intravenous Q8H    heparin (Porcine) 1000 UNIT/ML injection 1,500 Units  1.5 mL Intravenous PRN Dialysis    lidocaine-prilocaine (Emla) 2.5-2.5 % cream   Topical PRN    ipratropium-albuterol (Duoneb) 0.5-2.5 (3) MG/3ML inhalation solution 3 mL  3 mL Nebulization 2 times daily    ipratropium-albuterol (Duoneb) 0.5-2.5 (3) MG/3ML inhalation solution 3 mL  3 mL Nebulization Q6H PRN    influenza virus trivalent high dose PF (Fluzone HD) 0.5 mL injection (ages >/= 65 years) 0.5 mL  0.5 mL Intramuscular Prior to discharge    clopidogrel (Plavix) tab 75 mg  75 mg Oral Daily    carvedilol (Coreg) tab 25 mg  25 mg Oral BID    sevelamer carbonate (Renvela) tab 800 mg  800 mg Oral TID CC    insulin aspart (NovoLOG) 100 Units/mL FlexPen 1-7 Units  1-7 Units Subcutaneous TID CC and HS    atorvastatin (Lipitor) tab 40 mg  40 mg Oral Nightly    HYDROmorphone (Dilaudid) 1 MG/ML injection 0.1 mg  0.1 mg Intravenous Q2H PRN    Or    HYDROmorphone (Dilaudid) 1 MG/ML injection 0.2 mg  0.2 mg Intravenous Q2H PRN    Or    HYDROmorphone (Dilaudid) 1 MG/ML injection 0.4 mg  0.4 mg Intravenous Q2H PRN    epoetin donna (Epogen, Procrit) 5,000 Units injection  5,000 Units Subcutaneous Once per day on Monday Wednesday Friday    metoclopramide (Reglan) 5 mg/mL injection 10 mg  10 mg Intravenous Q24H PRN    sodium chloride 0.9 % irrigation    Continuous PRN    sodium chloride 0.9 % irrigation    Continuous PRN    ondansetron (Zofran) 4 MG/2ML injection 4 mg  4 mg Intravenous Q6H PRN    diphenhydrAMINE (Benadryl) cap/tab 25 mg  25 mg Oral Q4H PRN    Or     diphenhydrAMINE (Benadryl) 50 mg/mL  injection 12.5 mg  12.5 mg Intravenous Q4H PRN    heparin (Porcine) 5000 UNIT/ML injection 5,000 Units  5,000 Units Subcutaneous Q8H STEPHANIE    dextrose 10% infusion (TPN no rate)   Intravenous Continuous PRN    pancrelipase (Lip-Prot-Amyl) (Zenpep) DR particles cap 10,000 Units  10,000 Units Per G Tube PRN    And    sodium bicarbonate tab 325 mg  325 mg Oral PRN    senna (Senokot) 8.8 MG/5ML oral syrup 17.6 mg  10 mL Oral BID    mineral oil-white petrolatum (Artificial Tears) 83-15 % ophthalmic ointment 1 Application  1 Application Both Eyes Nightly    lidocaine-menthol 4-1 % patch 2 patch  2 patch Transdermal Daily    hydrALAzine (Apresoline) 20 mg/mL injection 10 mg  10 mg Intravenous Q6H PRN    labetalol (Trandate) 5 mg/mL injection 10 mg  10 mg Intravenous Q4H PRN    acetaminophen (Tylenol) 160 MG/5ML oral liquid 500 mg  500 mg Per NG Tube Daily PRN    acetaminophen (Tylenol) tab 650 mg  650 mg Oral Q4H PRN    Or    acetaminophen (Tylenol) 160 MG/5ML oral liquid 650 mg  650 mg Per NG Tube Q4H PRN    Or    acetaminophen (Tylenol) rectal suppository 650 mg  650 mg Rectal Q4H PRN    polyethylene glycol (PEG 3350) (Miralax) 17 g oral packet 17 g  17 g Per NG Tube Daily PRN    bisacodyl (Dulcolax) 10 MG rectal suppository 10 mg  10 mg Rectal Daily PRN    albuterol (Ventolin HFA) 108 (90 Base) MCG/ACT inhaler 2 puff  2 puff Inhalation Q4H PRN    fluticasone propionate (Flonase) 50 MCG/ACT nasal suspension 2 spray  2 spray Nasal Daily PRN    glucose (Dex4) 15 GM/59ML oral liquid 15 g  15 g Oral Q15 Min PRN    Or    glucose (Glutose) 40% oral gel 15 g  15 g Oral Q15 Min PRN    Or    glucose-vitamin C (Dex-4) chewable tab 4 tablet  4 tablet Oral Q15 Min PRN    Or    dextrose 50% injection 50 mL  50 mL Intravenous Q15 Min PRN    Or    glucose (Dex4) 15 GM/59ML oral liquid 30 g  30 g Oral Q15 Min PRN    Or    glucose (Glutose) 40% oral gel 30 g  30 g Oral Q15 Min PRN    Or     glucose-vitamin C (Dex-4) chewable tab 8 tablet  8 tablet Oral Q15 Min PRN    fluticasone-salmeterol (Advair Diskus) 250-50 MCG/ACT inhaler 1 puff  1 puff Inhalation BID

## 2024-10-18 NOTE — PROGRESS NOTES
[facility]  part of [facility]    Progress Note    Ollie Hernández Patient Status:  Inpatient    1945 MRN [id number]   Location [facility] 4W/SW/SE Attending Pranay Michel MD   Hosp Day # 20 PCP Wili Parmar MD     Chief Complaint:   Chief Complaint   Patient presents with    Fall       Subjective:   Ollie Hernández is doing better today in good spirits. No SOB no palpitations no dizziness. No cough. Pain better controlled.   Tele: converted to NSR yesterday   Objective:   Objective:    Blood pressure 144/54, pulse 65, temperature 97.7 °F (36.5 °C), temperature source Axillary, resp. rate 20, height 5' 4.02\" (1.626 m), weight 131 lb 1.6 oz (59.5 kg), SpO2 98%.    Physical Exam:    General: No acute distress. Appears frail, ill   Respiratory: Clear to auscultation bilaterally. No wheezes. No rhonchi.  Cardiovascular: RRR  Abdomen: Soft, nontender, nondistended.  Positive bowel sounds. No rebound or guarding.  Neurologic: No focal neurological deficits.   Musculoskeletal: Moves all extremities.  Extremities: No edema.      Results:   Results:    Labs:  Recent Labs   Lab 10/13/24  0442 10/14/24  0427 10/16/24  0421 10/17/24  0412 10/18/24  0633   WBC 19.3* 19.1* 15.6* 14.6* 17.0*   HGB 8.5* 8.5* 9.4* 9.8* 9.5*   MCV 93.6 96.1 94.9 100.6* 104.7*   .0* 428.0 421.0 385.0 255.0       Recent Labs   Lab 10/16/24  0421 10/17/24  0412 10/18/24  0849   * 177* 203*   BUN 97* 57* 72*   CREATSERUM 8.70* 6.37* 7.66*   CA 10.1 9.9 9.4   ALB  --  3.8  --     138 133*   K 4.7 4.6 4.2    100 100   CO2 25.0 26.0 19.0*   ALKPHO  --  103  --    AST  --  39*  --    ALT  --  19  --    BILT  --  0.4  --    TP  --  7.2  --        Estimated Creatinine Clearance: 6.8 mL/min (A) (based on SCr of 7.66 mg/dL (H)).    No results for input(s): \"PTP\", \"INR\" in the last 168 hours.         Culture:  Hospital Encounter on 24   1. Blood Culture     Status: None    Collection  Time: 09/30/24 10:03 AM    Specimen: Bld,Arterial Line; Blood   Result Value Ref Range    Blood Culture Result No Growth 5 Days N/A       Cardiac  No results for input(s): \"TROP\", \"PBNP\" in the last 168 hours.      Imaging: Imaging data reviewed in Epic.  No results found.    Medications:    dilTIAZem  30 mg Oral 4 times per day    pantoprazole  40 mg Intravenous Q24H    acetaminophen  1,000 mg Intravenous Q8H    ipratropium-albuterol  3 mL Nebulization 2 times daily    clopidogrel  75 mg Oral Daily    carvedilol  25 mg Oral BID    sevelamer carbonate  800 mg Oral TID CC    insulin aspart  1-7 Units Subcutaneous TID CC and HS    atorvastatin  40 mg Oral Nightly    epoetin donna  5,000 Units Subcutaneous Once per day on Monday Wednesday Friday    heparin  5,000 Units Subcutaneous Q8H STEPHANIE    senna  10 mL Oral BID    mineral oil-white petrolatum  1 Application Both Eyes Nightly    lidocaine-menthol  2 patch Transdermal Daily    fluticasone-salmeterol  1 puff Inhalation BID         Assessment and Plan:   Assessment & Plan:        Acute hypoxemic respiratory failure - resolved  Hypotension - resolved  Aspiration PNA - better   Improved.   Pulmonary and cards on consult.   On RA now was briefly intubated on mechanical ventilation 9/29-10/6.   Completed course of zosyn.   Was in ICU has been transferred to floor.   SLP eval - Evidence of aspiration   NPO. Plans for PEG tube placement on Saturday (due to getting Plavix on 10/15) Plavix on hold   Afib RVR   Cards on consult.   Amiodarone gtt per EP. S/p cardizem and digoxin   Coreg held due to NPO   ESRD   Renal on cosnult.   HD M,W,F  Epogen, renvela   L hip fracture   S/p mechanical fall   Orthopedic surgery signed off   S/p L hip arthroplasty 10/4/24  Pain better controlled.   XR L hip as pt continues with pain   PT/OT - [facility]   DM II   Stop scheduled insulin as NPO  ISS   Acute on chronic HFpEF - better   CAD   Elevated troponin secondary to demand ischemia   Cards on  consult.   Coreg, statin , ASA. Plavix on hold due to PEG placement planned. All PO meds not given due to NPO   Transfer to tele bed.   Coffee ground emesis - resolved.   IV PPI BID   Hemodynamically stable.   Plavix on hold   H/H stable.   Deconditioned   PT/OT - [facility]          >55min spent, >50% spent counseling and coordinating care in the form of educating pt/family and d/w consultants and staff. Most of the time spent discussing the above plan.        Plan of care discussed with patient or family at bedside.    Pranay Michel MD  Hospitalist          Supplementary Documentation:     Quality:  DVT Prophylaxis: SCD heparin on hold   CODE status: Full   Dispo: per clinical course            Estimated date of discharge: TBD  Discharge is dependent on: clinical stability  At this point Mr. Hernández is expected to be discharge to: [facility]

## 2024-10-18 NOTE — PROGRESS NOTES
[facility]  part of [facility]    Progress Note    Ollie Hernández Patient Status:  Inpatient    1945 MRN [id number]   Location [facility] 3W/SW Attending Pranay Michel MD   Hosp Day # 20 PCP Wili Parmar MD         Subjective:   Subjective:  Attempted to see patient, currently getting dialysis. Unable to converse w/ patient.     Objective:   Blood pressure 144/54, pulse 65, temperature 97.7 °F (36.5 °C), temperature source Axillary, resp. rate 20, height 5' 4.02\" (1.626 m), weight 131 lb 1.6 oz (59.5 kg), SpO2 98%.  Physical Exam  Lower extremity skin healthy, not pitting edema. Rest of exam deferred d/t pt getting dialysis.     Results:   Lab Results   Component Value Date    WBC 17.0 (H) 10/18/2024    HGB 9.5 (L) 10/18/2024    HCT 31.2 (L) 10/18/2024    .0 10/18/2024    CREATSERUM 7.66 (H) 10/18/2024    BUN 72 (H) 10/18/2024     (L) 10/18/2024    K 4.2 10/18/2024     10/18/2024    CO2 19.0 (L) 10/18/2024     (H) 10/18/2024    CA 9.4 10/18/2024    ALB 3.8 10/17/2024    ALKPHO 103 10/17/2024    BILT 0.4 10/17/2024    TP 7.2 10/17/2024    AST 39 (H) 10/17/2024    ALT 19 10/17/2024    PTT 30.1 2024    INR 1.45 (H) 2024    T4F 0.9 2022    TSH 2.844 2024     (H) 2024    PSA 2.94 10/20/2021    DDIMER 6.07 (H) 2024    ESRML 10 2024    CRP 1.30 (H) 2024    MG 2.1 10/18/2024    PHOS 6.4 (H) 10/18/2024    TROP <0.045 2019    TROPHS 25 10/14/2024     2023    B12 672 2024       No results found.  EKG 12 Lead    Result Date: 10/17/2024  Atrial flutter with 2:1 A-V conduction ST & T wave abnormality, consider anterolateral ischemia Abnormal ECG When compared with ECG of 14-OCT-2024 05:26, Atrial flutter has replaced Sinus rhythm Vent. rate has increased BY  78 BPM Confirmed by BLAS DIEZ, RACHEL ([id number]) on 10/17/2024 1:47:19 PM     Assessment & Plan:     Closed displaced midcervical  fracture of left femur with delayed healing  Patient doing well 2 weeks status post ORIF of left hip.  Attempted to remove staples, but deferred this due to patient getting dialysis.  Spoke with nurse and discussed that if for policy allows them to remove staples she can do so today.  I ordered a left hip x-ray the patient's hip.  Orthopedic standpoint he should continue ambulating weightbearing as tolerated with assistance.  Continue discharge planning to an acute rehab.      ESRD (end stage renal disease) (HCC)  Per renal      Closed fracture of left hip (HCC)  As above      Hyperphosphatemia  Per renal medicine      Respiratory failure (HCC)  Per medicine and pulmonary      Anemia  Per medicine              SOFÍA Delgado  10/18/2024

## 2024-10-18 NOTE — PROGRESS NOTES
[facility]  part of [facility]    Progress Note    Ollie Heránndez Patient Status:  Inpatient    1945 MRN [id number]   Location [facility] 3W/SW Attending Pranay Michel MD   Hosp Day # 20 PCP Wili Parmar MD       Subjective:   Ollie Hernández is a(n) 79 year old male     ROS:     Constitutional: About the same today  ENMT:  Negative for ear drainage, hearing loss and nasal drainage  Eyes:  Negative for eye discharge and vision loss  Cardiovascular:  Negative for chest pain, sob, irregular heartbeat/palpitations  Respiratory:  Negative for cough, dyspnea and wheezing  Gastrointestinal: Unable to swallow  Genitourinary:  Negative for dysuria and hematuria  Endocrine:  Negative for abnormal sleep patterns, increased activity, polydipsia and polyphagia  Hema/Lymph:  Negative for easy bleeding and easy bruising  Integumentary:  Negative for pruritus and rash  Musculoskeletal:  Negative for bone/joint symptoms  Neurological:  Negative for gait disturbance  Psychiatric:  Negative for inappropriate interaction and psychiatric symptoms      Vitals:    10/18/24 0826   BP: 144/54   Pulse: 65   Resp: 20   Temp: 97.7 °F (36.5 °C)           PHYSICAL EXAM:   Constitutional: appears medically ill  Head/Face: normocephalic  Eyes/Vision: normal extraocular motion is intact  Nose/Mouth/Throat:mucous membranes are moist and no oral lesions are noted  Neck/Thyroid: neck is supple without adenopathy  Lymphatic: no abnormal cervical, supraclavicular adenopathy is noted  Respiratory:  lungs are clear to auscultation bilaterally, normal respiratory effort  Cardiovascular: regular rate and rhythm no murmurs, gallups, or rubs  Abdomen: soft, non-tender, non-distended, BS normal  Vascular: well perfused femoral, and pedal pulses normal  Skin/Hair: no unusual rashes present, no abnormal bruising noted  Back/Spine: no abnormalities noted  Musculoskeletal: full ROM all extremities good strength  no  deformities  Extremities: no edema, cyanosis  Neurological:  Grossly normal    Results:     Laboratory Data:  Lab Results   Component Value Date    WBC 17.0 (H) 10/18/2024    HGB 9.5 (L) 10/18/2024    HCT 31.2 (L) 10/18/2024    .0 10/18/2024    CREATSERUM 7.66 (H) 10/18/2024    BUN 72 (H) 10/18/2024     (L) 10/18/2024    K 4.2 10/18/2024     10/18/2024    CO2 19.0 (L) 10/18/2024     (H) 10/18/2024    CA 9.4 10/18/2024    ALB 3.8 10/17/2024    ALKPHO 103 10/17/2024    BILT 0.4 10/17/2024    TP 7.2 10/17/2024    AST 39 (H) 10/17/2024    ALT 19 10/17/2024    PTT 30.1 08/09/2024    INR 1.45 (H) 08/09/2024    T4F 0.9 08/31/2022    TSH 2.844 07/04/2024     (H) 07/30/2024    PSA 2.94 10/20/2021    DDIMER 6.07 (H) 09/29/2024    ESRML 10 06/16/2024    CRP 1.30 (H) 06/16/2024    MG 2.1 10/18/2024    PHOS 6.4 (H) 10/18/2024    TROP <0.045 07/25/2019     08/05/2023    B12 672 07/04/2024       Imaging:  [unfilled]   No results found.      ASSESSMENT/PLAN:   Assessment       1 ESRD had dialysis today tolerated  #2 rapid heart rate cardiology on board on amiodarone  #3 inability to swallow to get G-tube tomorrow  #4 hip fracture will need long-term rehab  Discussed with wife and Dr. sullivan   #5 elevated white count questionable continued aspiration defer to attending      10/18/2024  José Callahan MD

## 2024-10-18 NOTE — PROGRESS NOTES
GI  PROGRESS NOTE    SUBJECTIVE: alert; denies abdominal pain;   Family at beside.       OBJECTIVE:  Temp:  [97.7 °F (36.5 °C)-99 °F (37.2 °C)] 97.7 °F (36.5 °C)  Pulse:  [] 65  Resp:  [18-20] 20  BP: (100-158)/(47-66) 144/54  SpO2:  [96 %-98 %] 98 %  Exam  Gen: No acute distress, alert and oriented x3  Pulm: Lungs clear, normal respiratory effort  CV: Heart with regular rate and rhythm, no peripheral edema  Abd: Abdomen soft, NT; ND; (+) BS; no organomegaly, bowel sounds present    Labs:     Recent Labs   Lab 10/13/24  0442 10/14/24  0427 10/16/24  0421 10/17/24  0412 10/18/24  0633   WBC 19.3* 19.1* 15.6* 14.6* 17.0*   HGB 8.5* 8.5* 9.4* 9.8* 9.5*   MCV 93.6 96.1 94.9 100.6* 104.7*   .0* 428.0 421.0 385.0 255.0       Recent Labs   Lab 10/14/24  0427 10/15/24  1222 10/16/24  0421 10/17/24  0412 10/18/24  0849    139 138 138 133*   K 4.7 4.9 4.7 4.6 4.2    101 102 100 100   CO2 24.0 24.0 25.0 26.0 19.0*   * 86* 97* 57* 72*   CREATSERUM 8.16* 7.54* 8.70* 6.37* 7.66*   CA 10.1 10.0 10.1 9.9 9.4   MG 2.5  --  2.4 2.2 2.1   PHOS  --   --  7.6* 7.4* 6.4*   * 207* 122* 177* 203*       Recent Labs   Lab 10/17/24  0412   ALT 19   AST 39*   ALB 3.8         Meds:      dilTIAZem  30 mg Oral 4 times per day    pantoprazole  40 mg Intravenous Q24H    acetaminophen  1,000 mg Intravenous Q8H    ipratropium-albuterol  3 mL Nebulization 2 times daily    clopidogrel  75 mg Oral Daily    [Held by provider] carvedilol  25 mg Oral BID    sevelamer carbonate  800 mg Oral TID CC    insulin aspart  1-7 Units Subcutaneous TID CC and HS    atorvastatin  40 mg Oral Nightly    epoetin donna  5,000 Units Subcutaneous Once per day on Monday Wednesday Friday    heparin  5,000 Units Subcutaneous Q8H STEPHANIE    senna  10 mL Oral BID    mineral oil-white petrolatum  1 Application Both Eyes Nightly    lidocaine-menthol  2 patch Transdermal Daily    fluticasone-salmeterol  1 puff Inhalation BID      adult 3 in 1 TPN       adult 3 in 1 TPN 50 mL/hr at 10/17/24 2233    dilTIAZem Stopped (10/17/24 1643)    amiodarone 0.5 mg/min (10/18/24 0831)    dextrose 10%      sodium chloride      sodium chloride      dextrose 10% Stopped (10/05/24 1910)       dilTIAZem    heparin    lidocaine-prilocaine    sodium chloride **AND** albumin human    dextrose 10%    heparin    lidocaine-prilocaine    ipratropium-albuterol    influenza virus vaccine PF    HYDROmorphone **OR** HYDROmorphone **OR** HYDROmorphone    metoclopramide    sodium chloride    sodium chloride    ondansetron    diphenhydrAMINE **OR** diphenhydrAMINE    dextrose 10%    lipase-protease-amylase (Lip-Prot-Amyl) **AND** sodium bicarbonate    hydrALAzine    labetalol    acetaminophen    acetaminophen **OR** acetaminophen **OR** acetaminophen **OR** [DISCONTINUED] acetaminophen    polyethylene glycol (PEG 3350)    bisacodyl    albuterol    fluticasone propionate    glucose **OR** glucose **OR** glucose-vitamin C **OR** dextrose **OR** glucose **OR** glucose **OR** glucose-vitamin C    _______________________________________________________________    IMPRESSION: Pt is a 79 yr M admitted 9/28/2024 with femur fx after fall. GI following patient for oropharyngeal dysphagia.     ACTIVE ISSUES INCLUDE:     Oropharyngeal dysphagia -- Speech re-eval in am. If fails, anticipating EGD / PEG tube tomorrow. Plavix held.     2. ESRD / HD     3. PAF, s/p watchman device. HTN    4. Acute resp failure, aspiration and/or pulmonary edema; intubated 9/29/2024 extubated 10/6     All questions were answered for pt / pt's spouse and family at bedside.   Yash Sheppard M.D.  [facility]   _______________________________________________________________

## 2024-10-18 NOTE — PHYSICAL THERAPY NOTE
Attempted follow up treatment this AM, chart reviewed. Per RN pt currently receiving bedside dialysis. Will follow up later today as schedule allows and pt is available.     ADDENDUM 1422: Attempted PT again, pt will be going off unit for xray. Will follow up at later date.

## 2024-10-18 NOTE — PROGRESS NOTES
[facility]  part of [facility]    Progress Note    Ollie Hernández Patient Status:  Inpatient    1945 MRN [id number]   Location [facility] 3W/SW Attending Pranay Michel MD   Hosp Day # 19 PCP Wili Parmar MD       Subjective:   Ollie Hernández is a(n) 79 year old male     ROS:     Constitutional frustrated about being in the hospital  ENMT:  Negative for ear drainage, hearing loss and nasal drainage  Eyes:  Negative for eye discharge and vision loss  Cardiovascular:  Negative for chest pain, sob, irregular heartbeat/palpitations  Respiratory:  Negative for cough, dyspnea and wheezing  Gastrointestinal:  Negative for abdominal pain, constipation, decreased appetite, diarrhea and vomiting  Genitourinary:  Negative for dysuria and hematuria  Endocrine:  Negative for abnormal sleep patterns, increased activity, polydipsia and polyphagia  Hema/Lymph:  Negative for easy bleeding and easy bruising  Integumentary:  Negative for pruritus and rash  Musculoskeletal:  Negative for bone/joint symptoms  Neurological:  Negative for gait disturbance  Psychiatric depressed about his condition      Vitals:    10/17/24 1836   BP: 101/56   Pulse: (!) 144   Resp: 18   Temp: 98 °F (36.7 °C)           PHYSICAL EXAM:   Constitutional: appears well hydrated alert and responsive no acute distress noted  Head/Face: normocephalic  Eyes/Vision: normal extraocular motion is intact  Nose/Mouth/Throat:mucous membranes are moist and no oral lesions are noted  Neck/Thyroid: neck is supple without adenopathy  Lymphatic: no abnormal cervical, supraclavicular adenopathy is noted  Respiratory:  lungs are clear to auscultation bilaterally, normal respiratory effort  Cardiovascular: regular rate and rhythm no murmurs, gallups, or rubs  Abdomen: soft, non-tender, non-distended, BS normal  Vascular: well perfused femoral, and pedal pulses normal  Skin/Hair: no unusual rashes present, no abnormal bruising  noted  Back/Spine: no abnormalities noted  Musculoskeletal: full ROM all extremities good strength  no deformities  Extremities: no edema, cyanosis  Neurological:  Grossly normal    Results:     Laboratory Data:  Lab Results   Component Value Date    WBC 14.6 (H) 10/17/2024    HGB 9.8 (L) 10/17/2024    HCT 32.7 (L) 10/17/2024    .0 10/17/2024    CREATSERUM 6.37 (H) 10/17/2024    BUN 57 (H) 10/17/2024     10/17/2024    K 4.6 10/17/2024     10/17/2024    CO2 26.0 10/17/2024     (H) 10/17/2024    CA 9.9 10/17/2024    ALB 3.8 10/17/2024    ALKPHO 103 10/17/2024    BILT 0.4 10/17/2024    TP 7.2 10/17/2024    AST 39 (H) 10/17/2024    ALT 19 10/17/2024    PTT 30.1 08/09/2024    INR 1.45 (H) 08/09/2024    T4F 0.9 08/31/2022    TSH 2.844 07/04/2024     (H) 07/30/2024    PSA 2.94 10/20/2021    DDIMER 6.07 (H) 09/29/2024    ESRML 10 06/16/2024    CRP 1.30 (H) 06/16/2024    MG 2.2 10/17/2024    PHOS 7.4 (H) 10/17/2024    TROP <0.045 07/25/2019     08/05/2023    B12 672 07/04/2024       Imaging:  [unfilled]   No results found.      ASSESSMENT/PLAN:   Assessment       Fracture slow rehabilitation  #2 ESRD plan dialysis tomorrow no fluid removal    #3 not able to eat  Aspiration to get PEG placement on Saturday     #4 diabetes  Tachycardia yesterday with dialysis avoid fluid removal on Cardizem    Discussed with wife    10/17/2024  José Callahan MD

## 2024-10-18 NOTE — PLAN OF CARE
Vital signs stable overnight. Patient remained NSR with rates in 60s-70s. Continue amiodarone drip per Cardiology. Scheduled tylenol given for pain. Dressings changed. Continue PPN, plan for PEG placement possibly tomorrow. Plan for dialysis today.    Problem: Patient Centered Care  Goal: Patient preferences are identified and integrated in the patient's plan of care  Description: Interventions:  - What would you like us to know as we care for you? I'm from home with family.  - Provide timely, complete, and accurate information to patient/family  - Incorporate patient and family knowledge, values, beliefs, and cultural backgrounds into the planning and delivery of care  - Encourage patient/family to participate in care and decision-making at the level they choose  - Honor patient and family perspectives and choices  Outcome: Progressing     Problem: Diabetes/Glucose Control  Goal: Glucose maintained within prescribed range  Description: INTERVENTIONS:  - Monitor Blood Glucose as ordered  - Assess for signs and symptoms of hyperglycemia and hypoglycemia  - Administer ordered medications to maintain glucose within target range  - Assess barriers to adequate nutritional intake and initiate nutrition consult as needed  - Instruct patient on self management of diabetes  Outcome: Progressing     Problem: PAIN - ADULT  Goal: Verbalizes/displays adequate comfort level or patient's stated pain goal  Description: INTERVENTIONS:  - Encourage pt to monitor pain and request assistance  - Assess pain using appropriate pain scale  - Administer analgesics based on type and severity of pain and evaluate response  - Implement non-pharmacological measures as appropriate and evaluate response  - Consider cultural and social influences on pain and pain management  - Manage/alleviate anxiety  - Utilize distraction and/or relaxation techniques  - Monitor for opioid side effects  - Notify MD/LIP if interventions unsuccessful or patient  reports new pain  - Anticipate increased pain with activity and pre-medicate as appropriate  Outcome: Progressing     Problem: SAFETY ADULT - FALL  Goal: Free from fall injury  Description: INTERVENTIONS:  - Assess pt frequently for physical needs  - Identify cognitive and physical deficits and behaviors that affect risk of falls.  - [place] fall precautions as indicated by assessment.  - Educate pt/family on patient safety including physical limitations  - Instruct pt to call for assistance with activity based on assessment  - Modify environment to reduce risk of injury  - Provide assistive devices as appropriate  - Consider OT/PT consult to assist with strengthening/mobility  - Encourage toileting schedule  Outcome: Progressing     Problem: DISCHARGE PLANNING  Goal: Discharge to home or other facility with appropriate resources  Description: INTERVENTIONS:  - Identify barriers to discharge w/pt and caregiver  - Include patient/family/discharge partner in discharge planning  - Arrange for needed discharge resources and transportation as appropriate  - Identify discharge learning needs (meds, wound care, etc)  - Arrange for interpreters to assist at discharge as needed  - Consider post-discharge preferences of patient/family/discharge partner  - Complete POLST form as appropriate  - Assess patient's ability to be responsible for managing their own health  - Refer to Case Management Department for coordinating discharge planning if the patient needs post-hospital services based on physician/LIP order or complex needs related to functional status, cognitive ability or social support system  Outcome: Progressing     Problem: CARDIOVASCULAR - ADULT  Goal: Maintains optimal cardiac output and hemodynamic stability  Description: INTERVENTIONS:  - Monitor vital signs, rhythm, and trends  - Monitor for bleeding, hypotension and signs of decreased cardiac output  - Evaluate effectiveness of vasoactive medications to  optimize hemodynamic stability  - Monitor arterial and/or venous puncture sites for bleeding and/or hematoma  - Assess quality of pulses, skin color and temperature  - Assess for signs of decreased coronary artery perfusion - ex. Angina  - Evaluate fluid balance, assess for edema, trend weights  Outcome: Progressing  Goal: Absence of cardiac arrhythmias or at baseline  Description: INTERVENTIONS:  - Continuous cardiac monitoring, monitor vital signs, obtain 12 lead EKG if indicated  - Evaluate effectiveness of antiarrhythmic and heart rate control medications as ordered  - Initiate emergency measures for life threatening arrhythmias  - Monitor electrolytes and administer replacement therapy as ordered  Outcome: Progressing     Problem: RESPIRATORY - ADULT  Goal: Achieves optimal ventilation and oxygenation  Description: INTERVENTIONS:  - Assess for changes in respiratory status  - Assess for changes in mentation and behavior  - Position to facilitate oxygenation and minimize respiratory effort  - Oxygen supplementation based on oxygen saturation or ABGs  - Provide Smoking Cessation handout, if applicable  - Encourage broncho-pulmonary hygiene including cough, deep breathe, Incentive Spirometry  - Assess the need for suctioning and perform as needed  - Assess and instruct to report SOB or any respiratory difficulty  - Respiratory Therapy support as indicated  - Manage/alleviate anxiety  - Monitor for signs/symptoms of CO2 retention  Outcome: Progressing     Problem: GASTROINTESTINAL - ADULT  Goal: Maintains or returns to baseline bowel function  Description: INTERVENTIONS:  - Assess bowel function  - Maintain adequate hydration with IV or PO as ordered and tolerated  - Evaluate effectiveness of GI medications  - Encourage mobilization and activity  - Obtain nutritional consult as needed  - Establish a toileting routine/schedule  - Consider collaborating with pharmacy to review patient's medication profile  Outcome:  Progressing

## 2024-10-18 NOTE — PROGRESS NOTES
[facility]  part of [facility]    Progress Note    Ollie Hernández Patient Status:  Inpatient    1945 MRN [id number]   Location [facility] 3W/SW Attending Pranay Michel MD   Hosp Day # 20 PCP Wili Parmar MD     Subjective:   Subjective:    Patient awake, lying in bed.  Multiple family members present at bedside.  Staples were removed, wound looks like is healing well.  Patient denies any fever, chills, night sweats.Denies any new numbness or tingling, reports he had some prior tingling in his left foot prior to surgery.  Reports that he is feeling improved.  Patient on hemodialysis this morning.    Objective:   Blood pressure 144/54, pulse 65, temperature 97.7 °F (36.5 °C), temperature source Axillary, resp. rate 20, height 5' 4.02\" (1.626 m), weight 131 lb 1.6 oz (59.5 kg), SpO2 98%.  Physical Exam    Patient is healing well.  Staples removed today.  No asymmetric warmth or swelling to left thigh.  Patient able to straight leg left leg.  Able to dorsiflex and plantarflex bilateral feet.  Calf soft and nontender to palpation.    Results:   Lab Results   Component Value Date    WBC 17.0 (H) 10/18/2024    HGB 9.5 (L) 10/18/2024    HCT 31.2 (L) 10/18/2024    .0 10/18/2024    CREATSERUM 7.66 (H) 10/18/2024    BUN 72 (H) 10/18/2024     (L) 10/18/2024    K 4.2 10/18/2024     10/18/2024    CO2 19.0 (L) 10/18/2024     (H) 10/18/2024    CA 9.4 10/18/2024    ALB 3.8 10/17/2024    ALKPHO 103 10/17/2024    BILT 0.4 10/17/2024    TP 7.2 10/17/2024    AST 39 (H) 10/17/2024    ALT 19 10/17/2024    PTT 30.1 2024    INR 1.45 (H) 2024    T4F 0.9 2022    TSH 2.844 2024     (H) 2024    PSA 2.94 10/20/2021    DDIMER 6.07 (H) 2024    ESRML 10 2024    CRP 1.30 (H) 2024    MG 2.1 10/18/2024    PHOS 6.4 (H) 10/18/2024    TROP <0.045 2019    TROPHS 25 10/14/2024     2023    B12 672 2024        No results found.  EKG 12 Lead    Result Date: 10/17/2024  Atrial flutter with 2:1 A-V conduction ST & T wave abnormality, consider anterolateral ischemia Abnormal ECG When compared with ECG of 14-OCT-2024 05:26, Atrial flutter has replaced Sinus rhythm Vent. rate has increased BY  78 BPM Confirmed by BLAS DIEZ CASH ([id number]) on 10/17/2024 1:47:19 PM     Assessment & Plan:     Closed displaced midcervical fracture of left femur with delayed healing  Patient doing well 2 weeks status post ORIF left hip.  Waiting on left hip x-ray.  Discussed with the patient he can continue being weightbearing as tolerated with assistance.  Discussed he will follow-up with Dr. Murphy in 4 weeks in the outpatient clinic.  Continue discharge planning to acute rehab at this time.      ESRD (end stage renal disease) (HCC)  Per renal      Closed fracture of left hip (HCC)  As above      Hyperphosphatemia  Per renal medicine      Respiratory failure (HCC)  Per medicine and pulmonary       Anemia  Per medicine              SOFÍA Delgado  10/18/2024

## 2024-10-19 ENCOUNTER — APPOINTMENT (OUTPATIENT)
Dept: GENERAL RADIOLOGY | Facility: HOSPITAL | Age: 79
End: 2024-10-19
Attending: HOSPITALIST
Payer: MEDICARE

## 2024-10-19 ENCOUNTER — ANESTHESIA EVENT (OUTPATIENT)
Dept: ENDOSCOPY | Facility: HOSPITAL | Age: 79
End: 2024-10-19
Payer: MEDICARE

## 2024-10-19 ENCOUNTER — ANESTHESIA (OUTPATIENT)
Dept: ENDOSCOPY | Facility: HOSPITAL | Age: 79
End: 2024-10-19
Payer: MEDICARE

## 2024-10-19 LAB
ANION GAP SERPL CALC-SCNC: 9 MMOL/L (ref 0–18)
BASOPHILS # BLD AUTO: 0.03 X10(3) UL (ref 0–0.2)
BASOPHILS NFR BLD AUTO: 0.2 %
BUN BLD-MCNC: 49 MG/DL (ref 9–23)
BUN/CREAT SERPL: 9.5 (ref 10–20)
CALCIUM BLD-MCNC: 9.2 MG/DL (ref 8.7–10.4)
CHLORIDE SERPL-SCNC: 98 MMOL/L (ref 98–112)
CO2 SERPL-SCNC: 27 MMOL/L (ref 21–32)
CREAT BLD-MCNC: 5.18 MG/DL
DEPRECATED RDW RBC AUTO: 56.6 FL (ref 35.1–46.3)
EGFRCR SERPLBLD CKD-EPI 2021: 11 ML/MIN/1.73M2 (ref 60–?)
EOSINOPHIL # BLD AUTO: 0.42 X10(3) UL (ref 0–0.7)
EOSINOPHIL NFR BLD AUTO: 3.3 %
ERYTHROCYTE [DISTWIDTH] IN BLOOD BY AUTOMATED COUNT: 16.1 % (ref 11–15)
GLUCOSE BLD-MCNC: 133 MG/DL (ref 70–99)
GLUCOSE BLDC GLUCOMTR-MCNC: 124 MG/DL (ref 70–99)
GLUCOSE BLDC GLUCOMTR-MCNC: 131 MG/DL (ref 70–99)
GLUCOSE BLDC GLUCOMTR-MCNC: 131 MG/DL (ref 70–99)
GLUCOSE BLDC GLUCOMTR-MCNC: 154 MG/DL (ref 70–99)
GLUCOSE BLDC GLUCOMTR-MCNC: 161 MG/DL (ref 70–99)
HCT VFR BLD AUTO: 24.9 %
HGB BLD-MCNC: 8 G/DL
IMM GRANULOCYTES # BLD AUTO: 0.22 X10(3) UL (ref 0–1)
IMM GRANULOCYTES NFR BLD: 1.7 %
LYMPHOCYTES # BLD AUTO: 0.76 X10(3) UL (ref 1–4)
LYMPHOCYTES NFR BLD AUTO: 6 %
MAGNESIUM SERPL-MCNC: 1.8 MG/DL (ref 1.6–2.6)
MCH RBC QN AUTO: 31.1 PG (ref 26–34)
MCHC RBC AUTO-ENTMCNC: 32.1 G/DL (ref 31–37)
MCV RBC AUTO: 96.9 FL
MONOCYTES # BLD AUTO: 0.99 X10(3) UL (ref 0.1–1)
MONOCYTES NFR BLD AUTO: 7.8 %
NEUTROPHILS # BLD AUTO: 10.35 X10 (3) UL (ref 1.5–7.7)
NEUTROPHILS # BLD AUTO: 10.35 X10(3) UL (ref 1.5–7.7)
NEUTROPHILS NFR BLD AUTO: 81 %
OSMOLALITY SERPL CALC.SUM OF ELEC: 293 MOSM/KG (ref 275–295)
PLATELET # BLD AUTO: 235 10(3)UL (ref 150–450)
POTASSIUM SERPL-SCNC: 3.6 MMOL/L (ref 3.5–5.1)
RBC # BLD AUTO: 2.57 X10(6)UL
SODIUM SERPL-SCNC: 134 MMOL/L (ref 136–145)
WBC # BLD AUTO: 12.8 X10(3) UL (ref 4–11)

## 2024-10-19 PROCEDURE — 0DH63UZ INSERTION OF FEEDING DEVICE INTO STOMACH, PERCUTANEOUS APPROACH: ICD-10-PCS | Performed by: INTERNAL MEDICINE

## 2024-10-19 PROCEDURE — 99232 SBSQ HOSP IP/OBS MODERATE 35: CPT | Performed by: INTERNAL MEDICINE

## 2024-10-19 PROCEDURE — 71045 X-RAY EXAM CHEST 1 VIEW: CPT | Performed by: HOSPITALIST

## 2024-10-19 PROCEDURE — 3E0G76Z INTRODUCTION OF NUTRITIONAL SUBSTANCE INTO UPPER GI, VIA NATURAL OR ARTIFICIAL OPENING: ICD-10-PCS | Performed by: INTERNAL MEDICINE

## 2024-10-19 PROCEDURE — 99233 SBSQ HOSP IP/OBS HIGH 50: CPT | Performed by: HOSPITALIST

## 2024-10-19 DEVICE — IMPLANTABLE DEVICE: Type: IMPLANTABLE DEVICE | Status: FUNCTIONAL

## 2024-10-19 RX ORDER — HYDROMORPHONE HYDROCHLORIDE 1 MG/ML
0.6 INJECTION, SOLUTION INTRAMUSCULAR; INTRAVENOUS; SUBCUTANEOUS EVERY 5 MIN PRN
Status: DISCONTINUED | OUTPATIENT
Start: 2024-10-19 | End: 2024-10-19 | Stop reason: HOSPADM

## 2024-10-19 RX ORDER — SODIUM CHLORIDE 9 MG/ML
INJECTION, SOLUTION INTRAVENOUS CONTINUOUS PRN
Status: DISCONTINUED | OUTPATIENT
Start: 2024-10-19 | End: 2024-10-19 | Stop reason: SURG

## 2024-10-19 RX ORDER — HYDROMORPHONE HYDROCHLORIDE 1 MG/ML
0.4 INJECTION, SOLUTION INTRAMUSCULAR; INTRAVENOUS; SUBCUTANEOUS EVERY 5 MIN PRN
Status: DISCONTINUED | OUTPATIENT
Start: 2024-10-19 | End: 2024-10-19 | Stop reason: HOSPADM

## 2024-10-19 RX ORDER — MORPHINE SULFATE 4 MG/ML
4 INJECTION, SOLUTION INTRAMUSCULAR; INTRAVENOUS EVERY 10 MIN PRN
Status: DISCONTINUED | OUTPATIENT
Start: 2024-10-19 | End: 2024-10-19 | Stop reason: HOSPADM

## 2024-10-19 RX ORDER — SODIUM CHLORIDE, SODIUM LACTATE, POTASSIUM CHLORIDE, CALCIUM CHLORIDE 600; 310; 30; 20 MG/100ML; MG/100ML; MG/100ML; MG/100ML
INJECTION, SOLUTION INTRAVENOUS CONTINUOUS
Status: DISCONTINUED | OUTPATIENT
Start: 2024-10-19 | End: 2024-10-19

## 2024-10-19 RX ORDER — LANSOPRAZOLE 30 MG/1
30 TABLET, ORALLY DISINTEGRATING, DELAYED RELEASE ORAL
Status: DISCONTINUED | OUTPATIENT
Start: 2024-10-20 | End: 2024-10-22

## 2024-10-19 RX ORDER — NALOXONE HYDROCHLORIDE 0.4 MG/ML
80 INJECTION, SOLUTION INTRAMUSCULAR; INTRAVENOUS; SUBCUTANEOUS AS NEEDED
Status: DISCONTINUED | OUTPATIENT
Start: 2024-10-19 | End: 2024-10-19 | Stop reason: HOSPADM

## 2024-10-19 RX ORDER — CARVEDILOL 25 MG/1
25 TABLET ORAL 2 TIMES DAILY
Status: DISCONTINUED | OUTPATIENT
Start: 2024-10-19 | End: 2024-10-22

## 2024-10-19 RX ORDER — SODIUM BICARBONATE 650 MG/1
325 TABLET ORAL AS NEEDED
Status: DISCONTINUED | OUTPATIENT
Start: 2024-10-19 | End: 2024-10-22

## 2024-10-19 RX ORDER — HYDROCODONE BITARTRATE AND ACETAMINOPHEN 5; 325 MG/1; MG/1
1 TABLET ORAL EVERY 6 HOURS PRN
Status: DISCONTINUED | OUTPATIENT
Start: 2024-10-19 | End: 2024-10-22

## 2024-10-19 RX ORDER — HYDROMORPHONE HYDROCHLORIDE 1 MG/ML
0.2 INJECTION, SOLUTION INTRAMUSCULAR; INTRAVENOUS; SUBCUTANEOUS EVERY 5 MIN PRN
Status: DISCONTINUED | OUTPATIENT
Start: 2024-10-19 | End: 2024-10-19 | Stop reason: HOSPADM

## 2024-10-19 RX ORDER — MORPHINE SULFATE 4 MG/ML
2 INJECTION, SOLUTION INTRAMUSCULAR; INTRAVENOUS EVERY 10 MIN PRN
Status: DISCONTINUED | OUTPATIENT
Start: 2024-10-19 | End: 2024-10-19 | Stop reason: HOSPADM

## 2024-10-19 RX ORDER — MORPHINE SULFATE 10 MG/ML
6 INJECTION, SOLUTION INTRAMUSCULAR; INTRAVENOUS EVERY 10 MIN PRN
Status: DISCONTINUED | OUTPATIENT
Start: 2024-10-19 | End: 2024-10-19 | Stop reason: HOSPADM

## 2024-10-19 RX ORDER — CLOPIDOGREL BISULFATE 75 MG/1
75 TABLET ORAL DAILY
Status: DISCONTINUED | OUTPATIENT
Start: 2024-10-20 | End: 2024-10-22

## 2024-10-19 RX ORDER — ATORVASTATIN CALCIUM 40 MG/1
40 TABLET, FILM COATED ORAL NIGHTLY
Status: DISCONTINUED | OUTPATIENT
Start: 2024-10-19 | End: 2024-10-22

## 2024-10-19 RX ORDER — ACETAMINOPHEN 160 MG/5ML
650 SOLUTION ORAL EVERY 4 HOURS PRN
Status: DISCONTINUED | OUTPATIENT
Start: 2024-10-19 | End: 2024-10-22

## 2024-10-19 RX ORDER — EPHEDRINE SULFATE 50 MG/ML
INJECTION INTRAVENOUS AS NEEDED
Status: DISCONTINUED | OUTPATIENT
Start: 2024-10-19 | End: 2024-10-19 | Stop reason: SURG

## 2024-10-19 RX ADMIN — EPHEDRINE SULFATE 5 MG: 50 INJECTION INTRAVENOUS at 10:49:00

## 2024-10-19 RX ADMIN — EPHEDRINE SULFATE 5 MG: 50 INJECTION INTRAVENOUS at 10:43:00

## 2024-10-19 RX ADMIN — EPHEDRINE SULFATE 5 MG: 50 INJECTION INTRAVENOUS at 10:40:00

## 2024-10-19 RX ADMIN — EPHEDRINE SULFATE 10 MG: 50 INJECTION INTRAVENOUS at 10:45:00

## 2024-10-19 RX ADMIN — SODIUM CHLORIDE: 9 INJECTION, SOLUTION INTRAVENOUS at 10:30:00

## 2024-10-19 NOTE — PLAN OF CARE
Patient resting better after additional pain medication given. Plan for speech re-eval and possible peg tube placement later today.       Problem: Patient Centered Care  Goal: Patient preferences are identified and integrated in the patient's plan of care  Description: Interventions:  - What would you like us to know as we care for you?   - Provide timely, complete, and accurate information to patient/family  - Incorporate patient and family knowledge, values, beliefs, and cultural backgrounds into the planning and delivery of care  - Encourage patient/family to participate in care and decision-making at the level they choose  - Honor patient and family perspectives and choices  Outcome: Progressing     Problem: Diabetes/Glucose Control  Goal: Glucose maintained within prescribed range  Description: INTERVENTIONS:  - Monitor Blood Glucose as ordered  - Assess for signs and symptoms of hyperglycemia and hypoglycemia  - Administer ordered medications to maintain glucose within target range  - Assess barriers to adequate nutritional intake and initiate nutrition consult as needed  - Instruct patient on self management of diabetes  Outcome: Progressing     Problem: Patient/Family Goals  Goal: Patient/Family Long Term Goal  Description: Patient's Long Term Goal:     Interventions:  -   - See additional Care Plan goals for specific interventions  Outcome: Progressing  Goal: Patient/Family Short Term Goal  Description: Patient's Short Term Goal:     Interventions:   -   - See additional Care Plan goals for specific interventions  Outcome: Progressing     Problem: PAIN - ADULT  Goal: Verbalizes/displays adequate comfort level or patient's stated pain goal  Description: INTERVENTIONS:  - Encourage pt to monitor pain and request assistance  - Assess pain using appropriate pain scale  - Administer analgesics based on type and severity of pain and evaluate response  - Implement non-pharmacological measures as appropriate and  evaluate response  - Consider cultural and social influences on pain and pain management  - Manage/alleviate anxiety  - Utilize distraction and/or relaxation techniques  - Monitor for opioid side effects  - Notify MD/LIP if interventions unsuccessful or patient reports new pain  - Anticipate increased pain with activity and pre-medicate as appropriate  Outcome: Progressing     Problem: SAFETY ADULT - FALL  Goal: Free from fall injury  Description: INTERVENTIONS:  - Assess pt frequently for physical needs  - Identify cognitive and physical deficits and behaviors that affect risk of falls.  - [place] fall precautions as indicated by assessment.  - Educate pt/family on patient safety including physical limitations  - Instruct pt to call for assistance with activity based on assessment  - Modify environment to reduce risk of injury  - Provide assistive devices as appropriate  - Consider OT/PT consult to assist with strengthening/mobility  - Encourage toileting schedule  Outcome: Progressing     Problem: DISCHARGE PLANNING  Goal: Discharge to home or other facility with appropriate resources  Description: INTERVENTIONS:  - Identify barriers to discharge w/pt and caregiver  - Include patient/family/discharge partner in discharge planning  - Arrange for needed discharge resources and transportation as appropriate  - Identify discharge learning needs (meds, wound care, etc)  - Arrange for interpreters to assist at discharge as needed  - Consider post-discharge preferences of patient/family/discharge partner  - Complete POLST form as appropriate  - Assess patient's ability to be responsible for managing their own health  - Refer to Case Management Department for coordinating discharge planning if the patient needs post-hospital services based on physician/LIP order or complex needs related to functional status, cognitive ability or social support system  Outcome: Progressing     Problem: CARDIOVASCULAR - ADULT  Goal:  Maintains optimal cardiac output and hemodynamic stability  Description: INTERVENTIONS:  - Monitor vital signs, rhythm, and trends  - Monitor for bleeding, hypotension and signs of decreased cardiac output  - Evaluate effectiveness of vasoactive medications to optimize hemodynamic stability  - Monitor arterial and/or venous puncture sites for bleeding and/or hematoma  - Assess quality of pulses, skin color and temperature  - Assess for signs of decreased coronary artery perfusion - ex. Angina  - Evaluate fluid balance, assess for edema, trend weights  Outcome: Progressing  Goal: Absence of cardiac arrhythmias or at baseline  Description: INTERVENTIONS:  - Continuous cardiac monitoring, monitor vital signs, obtain 12 lead EKG if indicated  - Evaluate effectiveness of antiarrhythmic and heart rate control medications as ordered  - Initiate emergency measures for life threatening arrhythmias  - Monitor electrolytes and administer replacement therapy as ordered  Outcome: Progressing     Problem: RESPIRATORY - ADULT  Goal: Achieves optimal ventilation and oxygenation  Description: INTERVENTIONS:  - Assess for changes in respiratory status  - Assess for changes in mentation and behavior  - Position to facilitate oxygenation and minimize respiratory effort  - Oxygen supplementation based on oxygen saturation or ABGs  - Provide Smoking Cessation handout, if applicable  - Encourage broncho-pulmonary hygiene including cough, deep breathe, Incentive Spirometry  - Assess the need for suctioning and perform as needed  - Assess and instruct to report SOB or any respiratory difficulty  - Respiratory Therapy support as indicated  - Manage/alleviate anxiety  - Monitor for signs/symptoms of CO2 retention  Outcome: Progressing     Problem: GASTROINTESTINAL - ADULT  Goal: Maintains or returns to baseline bowel function  Description: INTERVENTIONS:  - Assess bowel function  - Maintain adequate hydration with IV or PO as ordered and  tolerated  - Evaluate effectiveness of GI medications  - Encourage mobilization and activity  - Obtain nutritional consult as needed  - Establish a toileting routine/schedule  - Consider collaborating with pharmacy to review patient's medication profile  Outcome: Progressing

## 2024-10-19 NOTE — PLAN OF CARE
0750: This RN called speech office and left voicemail asking if they can do speech eval as soon as possible.     0815: This RN messaged SLP Alysa this AM for an ETA.    0859: This RN called  answering call line to update that speech wont come to assess him this morning.     0906: This RN contacted speech and communicated to Alysa Sheppard's phone number.

## 2024-10-19 NOTE — PROGRESS NOTES
[facility]  part of [facility]    Progress Note    Ollie Hernández Patient Status:  Inpatient    1945 MRN [id number]   Location [facility] 4W/SW/SE Attending Pranay Michel MD   Hosp Day # 21 PCP Wili Parmar MD     Chief Complaint:   Chief Complaint   Patient presents with    Fall       Subjective:   Ollie Hernández is back from pEG tube placement. Doing well. Occasional cough. No SOB no CP no abd pain. Had hip pain last night better now   Objective:   Objective:    Blood pressure 155/50, pulse 68, temperature 98.4 °F (36.9 °C), temperature source Axillary, resp. rate 22, height 5' 4.02\" (1.626 m), weight 131 lb 1.6 oz (59.5 kg), SpO2 94%.    Physical Exam:    General: No acute distress. Appears frail, ill   Respiratory: Clear to auscultation bilaterally. No wheezes. No rhonchi.  Cardiovascular: RRR  Abdomen: Soft, nontender, nondistended.  Positive bowel sounds. No rebound or guarding.  Neurologic: No focal neurological deficits.   Musculoskeletal: Moves all extremities.  Extremities: No edema.      Results:   Results:    Labs:  Recent Labs   Lab 10/14/24  0427 10/16/24  0421 10/17/24  0412 10/18/24  0633 10/19/24  0723   WBC 19.1* 15.6* 14.6* 17.0* 12.8*   HGB 8.5* 9.4* 9.8* 9.5* 8.0*   MCV 96.1 94.9 100.6* 104.7* 96.9   .0 421.0 385.0 255.0 235.0       Recent Labs   Lab 10/17/24  0412 10/18/24  0849 10/19/24  0723   * 203* 133*   BUN 57* 72* 49*   CREATSERUM 6.37* 7.66* 5.18*   CA 9.9 9.4 9.2   ALB 3.8  --   --     133* 134*   K 4.6 4.2 3.6    100 98   CO2 26.0 19.0* 27.0   ALKPHO 103  --   --    AST 39*  --   --    ALT 19  --   --    BILT 0.4  --   --    TP 7.2  --   --        Estimated Creatinine Clearance: 10 mL/min (A) (based on SCr of 5.18 mg/dL (H)).    No results for input(s): \"PTP\", \"INR\" in the last 168 hours.         Culture:  Hospital Encounter on 24   1. Blood Culture     Status: None    Collection Time: 24 10:03 AM     Specimen: Bld,Arterial Line; Blood   Result Value Ref Range    Blood Culture Result No Growth 5 Days N/A       Cardiac  No results for input(s): \"TROP\", \"PBNP\" in the last 168 hours.      Imaging: Imaging data reviewed in [facility].  XR CHEST AP PORTABLE  (CPT=71045)    Result Date: 10/19/2024  CONCLUSION:   Patchy retrocardiac and left lower lobe airspace opacity has slightly progressed since prior exam suspicious for atelectasis or pneumonia    Dictated by (CST): Jeff Novak MD on 10/19/2024 at 8:06 AM     Finalized by (CST): Jeff Novak MD on 10/19/2024 at 8:07 AM          XR HIP W OR WO PELVIS 2 OR 3 VIEWS, LEFT (CPT=73502)    Result Date: 10/18/2024  CONCLUSION:  1. Anatomically aligned left total hip arthroplasty.  No fracture other acute complication. 2. Degenerative changes in the lower lumbar spine and SI joints. 3. Atherosclerotic vascular calcification.    Dictated by (CST): Bennie Cuadra MD on 10/18/2024 at 3:36 PM     Finalized by (CST): Bennie Cuadra MD on 10/18/2024 at 3:42 PM           Medications:    insulin regular human  1-7 Units Subcutaneous 4 times per day    [Held by provider] dilTIAZem  30 mg Oral 4 times per day    pantoprazole  40 mg Intravenous Q24H    acetaminophen  1,000 mg Intravenous Q8H    ipratropium-albuterol  3 mL Nebulization 2 times daily    clopidogrel  75 mg Oral Daily    [Held by provider] carvedilol  25 mg Oral BID    sevelamer carbonate  800 mg Oral TID CC    [Held by provider] insulin aspart  1-7 Units Subcutaneous TID CC and HS    atorvastatin  40 mg Oral Nightly    epoetin donna  5,000 Units Subcutaneous Once per day on Monday Wednesday Friday    heparin  5,000 Units Subcutaneous Q8H STEPHANIE    senna  10 mL Oral BID    mineral oil-white petrolatum  1 Application Both Eyes Nightly    lidocaine-menthol  2 patch Transdermal Daily    fluticasone-salmeterol  1 puff Inhalation BID         Assessment and Plan:   Assessment & Plan:        Acute hypoxemic respiratory failure -  resolved  Hypotension - resolved  Aspiration PNA - better   Improved.   Pulmonary and cards on consult.   On RA now was briefly intubated on mechanical ventilation 9/29-10/6.   Completed course of zosyn 7 days.   Was in ICU has been transferred to floor.   SLP eval - Evidence of aspiration   Failed video swallow with evidence of aspiration.   S/p PEG tube placement 10/19. Can start feeds/meds if ok with GI.   Afib RVR   Cards on consult.   Amiodarone gtt per EP. S/p cardizem and digoxin   Coreg held due to NPO   Will d/w Cards and resume PO meds via G tube   ESRD   Renal on cosnult.   HD M,W,F  Epogen, renvela   L hip fracture   S/p mechanical fall   Orthopedic surgery signed off   S/p L hip arthroplasty 10/4/24  Pain better controlled. Stop IV tyleol. Will stat PO pain meds  XR L hip as pt continues with pain   PT/OT - [facility]   DM II   Stop scheduled insulin as NPO  ISS   Acute on chronic HFpEF - better   CAD   Elevated troponin secondary to demand ischemia   Cards on consult.   Coreg, statin , ASA. Plavix on hold due to PEG placement planned. All PO meds not given due to NPO   Transfer to tele bed.   Coffee ground emesis - resolved.   Change to PO PPI daily   Hemodynamically stable.   Plavix on hold   H/H stable.   Deconditioned   PT/OT - [facility]          >55min spent, >50% spent counseling and coordinating care in the form of educating pt/family and d/w consultants and staff. Most of the time spent discussing the above plan.        Plan of care discussed with patient or family at bedside.    Pranay Michel MD  Hospitalist          Supplementary Documentation:     Quality:  DVT Prophylaxis: SCD heparin on hold   CODE status: Full   Dispo: per clinical course            Estimated date of discharge: TBD  Discharge is dependent on: clinical stability  At this point Mr. Hernández is expected to be discharge to: [facility]

## 2024-10-19 NOTE — OPERATIVE REPORT
ENDOSCOPY OPERATIVE REPORT    Patient Name: Ollie Hernández  Medical Record #: [id number]  YOB: 1945  Date of Procedure: 10/19/2024    Preoperative Diagnosis: severe oropharyngeal dysphagia.   Postoperative Diagnosis:    1.) Successful placement of 20 Fr PEG tube via PULL technique.     2.) Emetogenic injury to gastric fundus. Mild duodenitis.   Procedure Performed: Esophagogastroduodenoscopy with PEG tube placement.   ASA: 4. Anesthesia Given: MAC. Moderate sedation time: NA. Deep sedation was provided by anesthesiologist. Ancef IVPB was given as prophylaxis.   Endoscopist: Yash Sheppard M.D.  Procedure:  After the patient was interviewed and the procedure again discussed and questions addressed, the patient was brought to the GI Lab and monitoring of the B/P, pulse, and pulse oximetry was performed.A time-out procedure was performed, history and physical were reviewed, medical reconciliation was complete, informed consent was obtained.  The patient was then placed in the left lateral decubitus position and sedated with divided doses of IV medication; continuous vital signs were monitored throughout the procedure.  The Olympus videogastroscope was intubated into the esophagus and advanced into the 2nd part of duodenum under directed vision without difficulty. Then withdrawn. A thorough exam was performed. The patient tolerated the procedure well.   FINDINGS: The esophagus mucosa had an overall normal appearance. The gastric lumen was then entered and views of the gastric mucosa as well as retroverted views of the fundus. Emetogenic injury was noted in stomach fundus.  A normal pylorus was passed and the duodenal bulb entered, the duodenal bulb and post-bulbar duodenum were unremarkable except for mild duodenitis.   An appropriate site for G tube placement was identified by transillumination of light and digital impression. This area was prepped in sterile fashion. 1% lidocaine was used for local  anesthesia. A small 6 mm skin incision was made and a trochar was introduced through the incision. The trochar was grabbed with a snare endoscopically. A wire was introduced through the trochar and grabbed with the snare. The wire was pulled out through the mouth. A 20 Fr G tube was the deployed using the PULL technique. Appropriate placement of the G tube was documented endoscopically. The G tube was then secured externally. The procedure was tolerated well and upon completion and throughtout the vital signs were stable.  COMPLICATIONS: None.  PLAN:  1.) OK to use G-tube for medications / TF in 2 hours if patient remains stable.    2.) OK to resume Plavix in am.   [facility]

## 2024-10-19 NOTE — PROGRESS NOTES
Speech Pathology Service    Orders received, chart reviewed, further swallow evaluations deferred at this time as patient completed videofluoroscopic swallow study 10/15/24 with results/recommendations below. Due to the severity of dysphagia and gross aspiration visualized, a safe/functional PO diet cannot be recommended and prognosis for dysphagia resolution is poor in the short term thus indicating the consideration for alternative means nutrition/hydration if consistent with patient/family goals of care. Patient will benefit from SLP service in next level of care to continue to address possible inclusion of PO intake in the long term however anticipate this will require time and intensity and prognosis may remain guarded for optimal PO safety/functionality due to the etiology and severity of dysphagia. No further acute SLP service is indicated at this time. Plan and recommendations/rationales discussed with Dr. Sheppard.     VFSS 10/15/24:  IMPRESSIONS:  Pt presents with severe oropharyngeal dysphagia. Functional oral skills on the controlled liquid trials.  ongue-base retraction reduced all consistencies with premature spillage of bolus into valleculae and pyriform sinus. Hyolarygneal excursion and epiglottic inversion/retroversion significantly reduced all trials. Pt with aspiration DURING and AFTER swallows all liquids. Pt unable to protect airway during the swallow- efforts to clear the moderate-severe pharyngeal retention with multiple swallows resulted into additional spillage into airway. Weak cough did not clear any of the aspirated material. No pureed trials presented; deemed unsafe given aspiration on the three controlled liquid trials and pharyngeal retention accumulating as exam progressed. UES appeared impaired with evidence of retrograde bolus flow. Collaborated with RN regarding Pt's swallowing plan of care. Pt with expectoration intermittently of barium phlegm post exam. Rec DOWNGRADE to NPO  status/non-oral means of nutrition/hydration.       Per radiologist, Dr. Bolden, Pt with diffuse DISH; prominent proliferative changes fusing the spine over 4 or more vertebral levels. Per SLP, this structural change impeded flow of bolus and contributed to pharyngeal residue and this reducing Pt's ability to to protect airway.   Additional treatment time spent reviewing each VFSS image on [facility] in radiology suite with Pt. Provided with thorough education regarding recommendation for NPO status. Pt able to view evidence of aspiration through [facility] images. Pt v/u.       Alysa Alanis M.S. CCC-SLP  Speech-Language Pathologist  [id number]

## 2024-10-19 NOTE — PROGRESS NOTES
[facility]  part of [facility]    Nephrology Progress Note    Chief Complaint   Patient presents with    Fall       Subjective:   79 year old male, following for ESRD on HD.     No new complaints today.   For PEG tube placement today.    Review of Systems:   Review of Systems   Constitutional:  Positive for fatigue. Negative for chills and fever.   HENT:  Positive for trouble swallowing.    Respiratory:  Negative for cough and shortness of breath.    Cardiovascular:  Negative for chest pain and leg swelling.   Gastrointestinal:  Negative for abdominal pain, constipation, diarrhea, nausea and vomiting.       Objective:   Temp:  [98.1 °F (36.7 °C)-98.5 °F (36.9 °C)] 98.3 °F (36.8 °C)  Pulse:  [64-82] 64  Resp:  [17-20] 17  BP: (133-155)/(49-66) 141/50  SpO2:  [96 %-99 %] 98 %  SpO2: 98 %     Intake/Output Summary (Last 24 hours) at 10/19/2024 1056  Last data filed at 10/19/2024 1036  Gross per 24 hour   Intake 293.7 ml   Output --   Net 293.7 ml     Wt Readings from Last 3 Encounters:   10/18/24 131 lb 1.6 oz (59.5 kg)   08/22/24 159 lb 12.8 oz (72.5 kg)   08/20/24 158 lb (71.7 kg)     Physical Exam  Constitutional:       Appearance: Normal appearance.   Cardiovascular:      Rate and Rhythm: Normal rate and regular rhythm.      Heart sounds: Normal heart sounds.      Comments: R AVF-pos bruit/thrill  Pulmonary:      Effort: Pulmonary effort is normal.      Breath sounds: Normal breath sounds.   Musculoskeletal:         General: Normal range of motion.   Skin:     General: Skin is warm and dry.   Neurological:      General: No focal deficit present.      Mental Status: He is alert and oriented to person, place, and time.   Psychiatric:         Mood and Affect: Mood normal.         Behavior: Behavior normal.         Medications:  Current Facility-Administered Medications   Medication Dose Route Frequency    adult 3 in 1 TPN   Intravenous Continuous TPN    adult 3 in 1 TPN   Intravenous Continuous  TPN    insulin regular human (Novolin R, Humulin R) 100 UNIT/ML injection 1-7 Units  1-7 Units Subcutaneous 4 times per day    dilTIAZem 10 mg BOLUS FROM BAG infusion  10 mg Intravenous Q1H PRN    dilTIAZem (cardIZEM) 100 mg in sodium chloride 0.9% 100 mL IVPB-ADDV  2.5-20 mg/hr Intravenous Continuous    [Held by provider] dilTIAZem (cardIZEM) tab 30 mg  30 mg Oral 4 times per day    pantoprazole (Protonix) 40 mg in sodium chloride 0.9% PF 10 mL IV push  40 mg Intravenous Q24H    amiodarone (Cordarone) 450 mg in dextrose 5% 250 mL infusion  0.5 mg/min Intravenous Continuous    heparin (Porcine) 1000 UNIT/ML injection 1,500 Units  1.5 mL Intravenous PRN Dialysis    lidocaine-prilocaine (Emla) 2.5-2.5 % cream   Topical PRN    sodium chloride 0.9 % IV bolus 100 mL  100 mL Intravenous Q30 Min PRN    And    albumin human (Albumin) 25% injection 25 g  25 g Intravenous PRN Dialysis    dextrose 10% infusion (TPN no rate)   Intravenous Continuous PRN    acetaminophen (Ofirmev) 10 mg/mL infusion premix 1,000 mg  1,000 mg Intravenous Q8H    heparin (Porcine) 1000 UNIT/ML injection 1,500 Units  1.5 mL Intravenous PRN Dialysis    lidocaine-prilocaine (Emla) 2.5-2.5 % cream   Topical PRN    ipratropium-albuterol (Duoneb) 0.5-2.5 (3) MG/3ML inhalation solution 3 mL  3 mL Nebulization 2 times daily    ipratropium-albuterol (Duoneb) 0.5-2.5 (3) MG/3ML inhalation solution 3 mL  3 mL Nebulization Q6H PRN    influenza virus trivalent high dose PF (Fluzone HD) 0.5 mL injection (ages >/= 65 years) 0.5 mL  0.5 mL Intramuscular Prior to discharge    clopidogrel (Plavix) tab 75 mg  75 mg Oral Daily    [Held by provider] carvedilol (Coreg) tab 25 mg  25 mg Oral BID    sevelamer carbonate (Renvela) tab 800 mg  800 mg Oral TID CC    [Held by provider] insulin aspart (NovoLOG) 100 Units/mL FlexPen 1-7 Units  1-7 Units Subcutaneous TID CC and HS    atorvastatin (Lipitor) tab 40 mg  40 mg Oral Nightly    HYDROmorphone (Dilaudid) 1 MG/ML injection  0.1 mg  0.1 mg Intravenous Q2H PRN    Or    HYDROmorphone (Dilaudid) 1 MG/ML injection 0.2 mg  0.2 mg Intravenous Q2H PRN    Or    HYDROmorphone (Dilaudid) 1 MG/ML injection 0.4 mg  0.4 mg Intravenous Q2H PRN    epoetin donna (Epogen, Procrit) 5,000 Units injection  5,000 Units Subcutaneous Once per day on Monday Wednesday Friday    metoclopramide (Reglan) 5 mg/mL injection 10 mg  10 mg Intravenous Q24H PRN    sodium chloride 0.9 % irrigation    Continuous PRN    sodium chloride 0.9 % irrigation    Continuous PRN    ondansetron (Zofran) 4 MG/2ML injection 4 mg  4 mg Intravenous Q6H PRN    diphenhydrAMINE (Benadryl) cap/tab 25 mg  25 mg Oral Q4H PRN    Or    diphenhydrAMINE (Benadryl) 50 mg/mL  injection 12.5 mg  12.5 mg Intravenous Q4H PRN    heparin (Porcine) 5000 UNIT/ML injection 5,000 Units  5,000 Units Subcutaneous Q8H STEPHANIE    dextrose 10% infusion (TPN no rate)   Intravenous Continuous PRN    pancrelipase (Lip-Prot-Amyl) (Zenpep) DR particles cap 10,000 Units  10,000 Units Per G Tube PRN    And    sodium bicarbonate tab 325 mg  325 mg Oral PRN    senna (Senokot) 8.8 MG/5ML oral syrup 17.6 mg  10 mL Oral BID    mineral oil-white petrolatum (Artificial Tears) 83-15 % ophthalmic ointment 1 Application  1 Application Both Eyes Nightly    lidocaine-menthol 4-1 % patch 2 patch  2 patch Transdermal Daily    hydrALAzine (Apresoline) 20 mg/mL injection 10 mg  10 mg Intravenous Q6H PRN    labetalol (Trandate) 5 mg/mL injection 10 mg  10 mg Intravenous Q4H PRN    acetaminophen (Tylenol) 160 MG/5ML oral liquid 500 mg  500 mg Per NG Tube Daily PRN    acetaminophen (Tylenol) tab 650 mg  650 mg Oral Q4H PRN    Or    acetaminophen (Tylenol) 160 MG/5ML oral liquid 650 mg  650 mg Per NG Tube Q4H PRN    Or    acetaminophen (Tylenol) rectal suppository 650 mg  650 mg Rectal Q4H PRN    polyethylene glycol (PEG 3350) (Miralax) 17 g oral packet 17 g  17 g Per NG Tube Daily PRN    bisacodyl (Dulcolax) 10 MG rectal suppository 10 mg  10  mg Rectal Daily PRN    albuterol (Ventolin HFA) 108 (90 Base) MCG/ACT inhaler 2 puff  2 puff Inhalation Q4H PRN    fluticasone propionate (Flonase) 50 MCG/ACT nasal suspension 2 spray  2 spray Nasal Daily PRN    glucose (Dex4) 15 GM/59ML oral liquid 15 g  15 g Oral Q15 Min PRN    Or    glucose (Glutose) 40% oral gel 15 g  15 g Oral Q15 Min PRN    Or    glucose-vitamin C (Dex-4) chewable tab 4 tablet  4 tablet Oral Q15 Min PRN    Or    dextrose 50% injection 50 mL  50 mL Intravenous Q15 Min PRN    Or    glucose (Dex4) 15 GM/59ML oral liquid 30 g  30 g Oral Q15 Min PRN    Or    glucose (Glutose) 40% oral gel 30 g  30 g Oral Q15 Min PRN    Or    glucose-vitamin C (Dex-4) chewable tab 8 tablet  8 tablet Oral Q15 Min PRN    fluticasone-salmeterol (Advair Diskus) 250-50 MCG/ACT inhaler 1 puff  1 puff Inhalation BID     Facility-Administered Medications Ordered in Other Encounters   Medication Dose Route Frequency    propofol (Diprivan) 10 mg/mL infusion premix   Intravenous Continuous PRN    sodium chloride 0.9% infusion   Intravenous Continuous PRN    EPHEDrine 50 MG/ML injection   Intravenous PRN              Results:     Recent Labs   Lab 10/17/24  0412 10/18/24  0633 10/19/24  0723   RBC 3.25* 2.98* 2.57*   HGB 9.8* 9.5* 8.0*   HCT 32.7* 31.2* 24.9*   .6* 104.7* 96.9   NEPRELIM  --   --  10.35*   WBC 14.6* 17.0* 12.8*   .0 255.0 235.0     Recent Labs   Lab 10/17/24  0412 10/18/24  0849 10/19/24  0723   * 203* 133*   BUN 57* 72* 49*   CREATSERUM 6.37* 7.66* 5.18*   CA 9.9 9.4 9.2   ALB 3.8  --   --     133* 134*   K 4.6 4.2 3.6    100 98   CO2 26.0 19.0* 27.0   ALKPHO 103  --   --    AST 39*  --   --    ALT 19  --   --    BILT 0.4  --   --    TP 7.2  --   --      PTT   Date Value Ref Range Status   08/09/2024 30.1 23.0 - 36.0 seconds Final     INR   Date Value Ref Range Status   08/09/2024 1.45 (H) 0.80 - 1.20 Final     Comment:     Therapeutic INR range for patients on  warfarin:  2.0-3.0 for most medical conditions and surgical prophylaxis   2.5-3.5 for mechanical heart valves and recurrent thromboembolism    Direct thrombin inhibitors (e.g. argatroban, bivalirudin), factor Xa inhibitors (e.g. apixaban, rivaroxaban), and conditions such as coagulation factor deficiency, vitamin K deficiency, and liver disease will prolong the prothrombin time/INR.    Unfractionated heparin and low molecular weight heparin do not affect the prothrombin time/INR up to a concentration of 1 IU/mL and 1.5 IU/mL, respectively.         No results for input(s): \"BNP\" in the last 168 hours.  Recent Labs   Lab 10/16/24  0421 10/17/24  0412 10/18/24  0849 10/19/24  0723   MG 2.4 2.2 2.1 1.8   PHOS 7.6* 7.4* 6.4*  --         Recent Labs   Lab 10/16/24  0421 10/17/24  0412 10/18/24  0849   PHOS 7.6* 7.4* 6.4*   ALB  --  3.8  --        XR CHEST AP PORTABLE  (CPT=71045)    Result Date: 10/19/2024  CONCLUSION:   Patchy retrocardiac and left lower lobe airspace opacity has slightly progressed since prior exam suspicious for atelectasis or pneumonia    Dictated by (CST): Jeff Novak MD on 10/19/2024 at 8:06 AM     Finalized by (CST): Jeff Novak MD on 10/19/2024 at 8:07 AM          XR HIP W OR WO PELVIS 2 OR 3 VIEWS, LEFT (CPT=73502)    Result Date: 10/18/2024  CONCLUSION:  1. Anatomically aligned left total hip arthroplasty.  No fracture other acute complication. 2. Degenerative changes in the lower lumbar spine and SI joints. 3. Atherosclerotic vascular calcification.    Dictated by (CST): Bennie Cuadra MD on 10/18/2024 at 3:36 PM     Finalized by (CST): Bennie Cuadra MD on 10/18/2024 at 3:42 PM         EKG 12 Lead    Result Date: 10/17/2024  Atrial flutter with 2:1 A-V conduction ST & T wave abnormality, consider anterolateral ischemia Abnormal ECG When compared with ECG of 14-OCT-2024 05:26, Atrial flutter has replaced Sinus rhythm Vent. rate has increased BY  78 BPM Confirmed by BLAS DIEZ CASH ([id number]) on  10/17/2024 1:47:19 PM     Assessment and Plan:     79 year old male with past medical history of T2DM, HTN, HLD, ESRD on HD MWF via R AVF, CAD s/p PCI (5/2024), A.fib on Eliquis, HFpEF, KRAIG, BPH, history of recurrent gastrointestinal bleeding, who is admitted with left femoral neck fracture.      ESRD HD MWF:   Next HD Monday.     Aspiration PNA:   Completed a course of zosyn.   For PEG placement today.   GI following    Left hip fracture:   S/p L hip arthroplasty 10/4/24   PT/OT    CAD/A.fib:   Cards following.    Anemia:   Continue EPO 5000 units 3 times per week.      Mirta Redman MD  10/19/2024

## 2024-10-19 NOTE — PLAN OF CARE
Problem: ALTERED NUTRIENT INTAKE - ADULT  Goal: Nutrient intake appropriate for improving, restoring or maintaining nutritional needs  Description: INTERVENTIONS:  - Assess nutritional status and recommend course of action  - Monitor oral intake, labs, and treatment plans  - Recommend appropriate diets, oral nutritional supplements, and vitamin/mineral supplements  - Recommend, monitor, and adjust tube feedings and TPN/PPN based on assessed needs  - Provide specific nutrition education as appropriate  10/19/2024 1315 by Tiana Guevara RD  Outcome: Progressing  10/19/2024 1311 by Tiana Guevara, ZAIDA  Outcome: Progressing

## 2024-10-19 NOTE — ANESTHESIA PREPROCEDURE EVALUATION
Anesthesia PreOp Note    HPI:     Ollie Hernández is a 79 year old male who presents for preoperative consultation requested by: Yash Sheppard MD    Date of Surgery: 10/19/2024    Procedure(s):  ESOPHAGOGASTRODUODENOSCOPY (EGD)  PERCUTANEOUS ENDOSCOPIC GASTROSTOMY PLACEMENT/JEJUNOSTOMY TUBE PLACEMENT  Indication: none given    Relevant Problems   No relevant active problems       NPO:  Last Liquid Consumption Date: 10/03/24  Last Liquid Consumption Time: 2000  Last Solid Consumption Date: 10/03/24  Last Solid Consumption Time: 2000  Last Liquid Consumption Date: 10/03/24          History Review:  Patient Active Problem List    Diagnosis Date Noted    Palliative care by specialist 10/18/2024    Hyperphosphatemia 10/07/2024    Respiratory failure ([facility]) 10/07/2024    Anemia 10/07/2024    Closed fracture of left hip ([facility]) 09/29/2024    Closed displaced midcervical fracture of left femur with delayed healing 09/28/2024    ESRD (end stage renal disease) ([facility]) 09/28/2024    GI bleed 08/09/2024    Lower GI bleeding 07/30/2024    ESRD on hemodialysis ([facility]) 07/30/2024    Rectal bleeding 06/13/2024    Anticoagulated 06/13/2024    Antiplatelet or antithrombotic long-term use 06/13/2024    PAF (paroxysmal atrial fibrillation) ([facility]) 06/05/2024    AVM (arteriovenous malformation) of colon 06/05/2024    ABLA (acute blood loss anemia) 06/05/2024    Lower GI bleed 06/01/2024    ESRD (end stage renal disease) on dialysis ([facility]) 06/01/2024    Unstable angina ([facility]) 05/22/2024    Smokers' cough ([facility]) 02/29/2024    Primary hypertension     Secondary hyperparathyroidism ([facility]) 02/10/2022    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 ([facility]) 02/10/2022    Atherosclerosis of native arteries of extremities with intermittent claudication, bilateral legs ([facility]) 02/10/2022    Chronic obstructive asthma ([facility]) 11/14/2021    Vitamin D deficiency 02/01/2021    Chronic  diastolic congestive heart failure (HCC) 03/02/2020    Anemia of chronic renal failure 12/04/2019    KRAIG (obstructive sleep apnea) 04/25/2017    Pulmonary HTN (HCC) 03/08/2016    Mixed hyperlipidemia 02/11/2016    Gout 11/25/2013    Type 2 diabetes mellitus with chronic kidney disease on chronic dialysis, with long-term current use of insulin (HCC) 03/10/2005       Past Medical History:    Anemia    Asthma (HCC)    Back problem    BPH (benign prostatic hyperplasia)    Calculus of kidney    Cataract    Coronary atherosclerosis    Diabetes (HCC)    ESRD (end stage renal disease) on dialysis ([facility])    Essential hypertension    High blood pressure    High cholesterol    History of blood transfusion    Hyperlipidemia    Neuropathy    hands and feet    KRAIG on CPAP    Renal disorder    Sleep apnea    Visual impairment    glasses    Vocal cord paralysis, unilateral partial       Past Surgical History:   Procedure Laterality Date    Appendectomy      1981    Back surgery      Neck/back - 1998    Capsule endoscopy - internal referral      Cataract extraction Right 01/08/2022    PHACOEMULSIFICATION WITH POSTERIOR CHAMBER INTRAOCULAR LENS, RIGHT EYE    Cataract extraction Left 12/21/2021    PHACOEMULSIFICATION WITH POSTERIOR CHAMBER INTRAOCULAR LENS, LEFT EYE    Cath bare metal stent (bms)      Cath drug eluting stent  05/21/2024    Successful IVUS guided PCI of the circumflex with a ABIMBOLA    Colonoscopy      Colonoscopy N/A 01/25/2021    Procedure: COLONOSCOPY;  Surgeon: Michael Gautam MD;  Location: [facility] ENDO    Colonoscopy N/A 06/03/2024    Procedure: COLONOSCOPY;  Surgeon: Gabriel Saldana MD;  Location: [facility] ENDOSCOPY    Colonoscopy N/A 06/15/2024    Procedure: COLONOSCOPY;  Surgeon: Carlyn Storey MD;  Location: [facility] ENDOSCOPY    Colonoscopy N/A 07/31/2024    Procedure: COLONOSCOPY;  Surgeon: Gabriel Saldana MD;  Location: [facility] ENDOSCOPY    Dialysis procedure  02/17/2023    right internal jugular tunneled dialysis catheter  placement.    Hand/finger surgery unlisted      Accidental trauma    Spine surgery procedure unlisted      Total hip arthroplasty Left 10/04/2024    Left anterior total hip arthroplasty    Upper gi endoscopy,diagnosis         Prescriptions Prior to Admission[1]  Current Medications and Prescriptions Ordered in Epic[2]    Allergies[3]    Family History   Problem Relation Age of Onset    Cancer Father         Kidney    Breast Cancer Mother     Diabetes Mother     Diabetes Maternal Grandmother     Diabetes Maternal Grandfather     Heart Disorder Other         Uncle     Social History     Socioeconomic History    Marital status:    Tobacco Use    Smoking status: Former     Current packs/day: 0.00     Average packs/day: 1 pack/day for 17.0 years (17.0 ttl pk-yrs)     Types: Cigarettes     Start date: 1964     Quit date: 1981     Years since quittin.8    Smokeless tobacco: Never   Vaping Use    Vaping status: Never Used   Substance and Sexual Activity    Alcohol use: No     Alcohol/week: 0.0 standard drinks of alcohol    Drug use: No    Sexual activity: Yes     Partners: Female       Available pre-op labs reviewed.  Lab Results   Component Value Date    WBC 12.8 (H) 10/19/2024    RBC 2.57 (L) 10/19/2024    HGB 8.0 (L) 10/19/2024    HCT 24.9 (L) 10/19/2024    MCV 96.9 10/19/2024    MCH 31.1 10/19/2024    MCHC 32.1 10/19/2024    RDW 16.1 (H) 10/19/2024    .0 10/19/2024     Lab Results   Component Value Date     (L) 10/19/2024    K 3.6 10/19/2024    CL 98 10/19/2024    CO2 27.0 10/19/2024    BUN 49 (H) 10/19/2024    CREATSERUM 5.18 (H) 10/19/2024     (H) 10/19/2024    PGLU 161 (H) 10/19/2024    CA 9.2 10/19/2024          Vital Signs:  Body mass index is 22.49 kg/m².   height is 1.626 m (5' 4.02\") and weight is 59.5 kg (131 lb 1.6 oz). His oral temperature is 98.3 °F (36.8 °C). His blood pressure is 133/49 and his pulse is 65. His respiration is 18 and oxygen saturation is 99%.    Vitals:    10/18/24 2201 10/19/24 0332 10/19/24 0421 10/19/24 0930   BP: 152/66  149/57 133/49   Pulse: 73 66 69 65   Resp: 18 18 18   Temp: 98.5 °F (36.9 °C)  98.4 °F (36.9 °C) 98.3 °F (36.8 °C)   TempSrc: Axillary  Oral Oral   SpO2: 96%  97% 99%   Weight:       Height:            Anesthesia Evaluation     Patient summary reviewed and Nursing notes reviewed    Airway   Mallampati: II  TM distance: >3 FB  Neck ROM: full  Dental          Pulmonary    (+) COPD moderate, asthma, sleep apnea on CPAP, rhonchi, rales  Cardiovascular   Exercise tolerance: poor  (+) hypertension, CAD, CABG/stent, CHF    NYHA Classification: II  ECG reviewed  Rhythm: irregular  Rate: normal  ROS comment: Impression/Plan:  79 year old male presenting with:     Impression:  1. Hip fracture Left - S/P L anterior total hip arthroplasty 10/4/24  2. Acute resp failure, aspiration and/or pulmonary edema; intubated 9/29/2024 extubated 10/6  -Failed weaning trial yesterday  3. ESRD on HD  4. DM  5. PAF, currently SR. S/p watchman device 9/11/24  6. HTN  7. HL, on statin PTA  8. Acute on chronic diastolic CHF  9. CAD s/p Ramin circ 5/21/24   10. Coffee grounds in OG tube  -Slight decline in Hb post-op  11. Elevated troponin, likely demand ischemia  12. VT-ns  1:24 AM 10/14/24       Plan:  - Cont carvedilol for rate control BP. Follow BP as restarts PRN labetalol    - Cont statin   - Plavix restarted 10/8. Hb stable taking PO  monitor for bleeding (recent Watchman implant 9/2024) consider restarting ASA 81 if Hb con't to remain stable      - Monitor on tele  - Reviewed w/ wife and RN's  at bedside   - Not clear cause for sinus tachy possibly pain , hypotension  BB held rebound  , low volume. Hb has been stable.   - EF 55%  TERRI 9/11/24 restart BB  when able taking PO   - Dialysis today      - Med Rx  limited by NPO  and hypotension     Con't , Amiodarone Gtt @  .5 hr consider  per G tube if able     D/W Dr Michel      Hold Diltiazem and  Dig.   -  Plavix on hold for pos      Neuro/Psych      GI/Hepatic/Renal    (+) chronic renal disease ESRD    Comments: ASSESSMENT/PLAN:     Assessment  1 ESRD had dialysis today tolerated  #2 rapid heart rate cardiology on board on amiodarone  #3 inability to swallow to get G-tube tomorrow  #4 hip fracture will need long-term rehab  Discussed with wife and Dr. michel     #5 elevated white count questionable continued aspiration defer to attending         Endo/Other    (+) diabetes mellitus type 2 poorly controlled using insulin    Comments:   Expand All Collapse All    [facility]  part of [facility]     Progress Note       Ollie Hernández Patient Status:  Inpatient   1945 MRN [id number]  Location [facility] 4W/SW/SE Attending Pranay Michel MD  Hosp Day # 20 PCP Wili Parmar MD     Chief Complaint:     Chief Complaint  Patient presents with  · Fall          Subjective:  Ollie Hernández is doing better today in good spirits. No SOB no palpitations no dizziness. No cough. Pain better controlled.   Tele: converted to NSR yesterday     Objective:     Objective:  Blood pressure 144/54, pulse 65, temperature 97.7 °F (36.5 °C), temperature source Axillary, resp. rate 20, height 5' 4.02\" (1.626 m), weight 131 lb 1.6 oz (59.5 kg), SpO2 98%.     Physical Exam:    General: No acute distress. Appears frail, ill   Respiratory: Clear to auscultation bilaterally. No wheezes. No rhonchi.  Cardiovascular: RRR  Abdomen: Soft, nontender, nondistended.  Positive bowel sounds. No rebound or guarding.  Neurologic: No focal neurological deficits.   Musculoskeletal: Moves all extremities.  Extremities: No edema.          Results:        Results:  Labs:    Recent Labs  Lab 10/13/24  0442 10/14/24  0427 10/16/24  0421 10/17/24  0412 10/18/24  0633  WBC 19.3* 19.1* 15.6* 14.6* 17.0*  HGB 8.5* 8.5* 9.4* 9.8* 9.5*  MCV 93.6 96.1 94.9 100.6* 104.7*  .0* 428.0 421.0 385.0 255.0          Recent  Labs  Lab 10/16/24  0421 10/17/24  0412 10/18/24  0849  * 177* 203*  BUN 97* 57* 72*  CREATSERUM 8.70* 6.37* 7.66*  CA 10.1 9.9 9.4  ALB  --  3.8  --    138 133*  K 4.7 4.6 4.2   100 100  CO2 25.0 26.0 19.0*  ALKPHO  --  103  --   AST  --  39*  --   ALT  --  19  --   BILT  --  0.4  --   TP  --  7.2  --         Estimated Creatinine Clearance: 6.8 mL/min (A) (based on SCr of 7.66 mg/dL (H)).     No results for input(s): \"PTP\", \"INR\" in the last 168 hours.        Culture:    Hospital Encounter on 09/28/24  1. Blood Culture     Status: None    Collection Time: 09/30/24 10:03 AM    Specimen: Bld,Arterial Line; Blood  Result Value Ref Range    Blood Culture Result No Growth 5 Days N/A        Cardiac  No results for input(s): \"TROP\", \"PBNP\" in the last 168 hours.        Imaging: Imaging data reviewed in Epic.  No results found.     Medications:     Scheduled Medications  · dilTIAZem  30 mg Oral 4 times per day  · pantoprazole  40 mg Intravenous Q24H  · acetaminophen  1,000 mg Intravenous Q8H  · ipratropium-albuterol  3 mL Nebulization 2 times daily  · clopidogrel  75 mg Oral Daily  · carvedilol  25 mg Oral BID  · sevelamer carbonate  800 mg Oral TID CC  · insulin aspart  1-7 Units Subcutaneous TID CC and HS  · atorvastatin  40 mg Oral Nightly  · epoetin donna  5,000 Units Subcutaneous Once per day on Monday Wednesday Friday  · heparin  5,000 Units Subcutaneous Q8H STEPHANIE  · senna  10 mL Oral BID  · mineral oil-white petrolatum  1 Application Both Eyes Nightly  · lidocaine-menthol  2 patch Transdermal Daily  · fluticasone-salmeterol  1 puff Inhalation BID               Assessment and Plan:        Assessment & Plan:  Acute hypoxemic respiratory failure - resolved  Hypotension - resolved  Aspiration PNA - better   º Improved.   º Pulmonary and cards on consult.   º On RA now was briefly intubated on mechanical ventilation 9/29-10/6.   º Completed course of zosyn.   º Was in ICU has been transferred to floor.    º SLP eval - Evidence of aspiration   º NPO. Plans for PEG tube placement on Saturday (due to getting Plavix on 10/15) Plavix on hold   Afib RVR   º Cards on consult.   º Amiodarone gtt per EP. S/p cardizem and digoxin   º Coreg held due to NPO   ESRD   º Renal on cosnult.   º HD M,W,F  º Epogen, renvela   L hip fracture   S/p mechanical fall   º Orthopedic surgery signed off   º S/p L hip arthroplasty 10/4/24  º Pain better controlled.   º XR L hip as pt continues with pain   º PT/OT - JESUS   DM II   º Stop scheduled insulin as NPO  º ISS   Acute on chronic HFpEF - better   CAD   Elevated troponin secondary to demand ischemia   º Cards on consult.   º Coreg, statin , ASA. Plavix on hold due to PEG placement planned. All PO meds not given due to NPO   º Transfer to tele bed.   Coffee ground emesis - resolved.   º IV PPI BID   º Hemodynamically stable.   º Plavix on hold   º H/H stable.   Deconditioned   º PT/OT - JESUS         Abdominal      Other findings: 10/19/24 07:23  Glucose: 133 (H)  Sodium: 134 (L)  Potassium: 3.6  Chloride: 98  Carbon Dioxide, Total: 27.0  BUN: 49 (H)  CREATININE: 5.18 (H)  CALCIUM: 9.2  BUN/CREATININE RATIO: 9.5 (L)  EGFR: 11 (L)      (H): Data is abnormally high  (L): Data is abnormally low            Anesthesia Plan:   ASA:  4  Plan:   MAC  Plan Comments: Pt is not responsive to all commands   Severely sedated or asleep   Informed Consent Plan and Risks Discussed With:  Spouse  Discussed plan with:  Attending and surgeon  Provider Attestation (if preop done by other):  Discussed in details possibility of intra op ETT placement and poss ICU/Vent   Wife appeared to understand consented for procedure and anesthesia      I have informed Ollie Betty and/or legal guardian or family member of the nature of the anesthetic plan, benefits, risks including possible dental damage if relevant, major complications, and any alternative forms of anesthetic management.   All of the patient's  questions were answered to the best of my ability. The patient desires the anesthetic management as planned.  JAYNE SLATER MD  10/19/2024 9:45 AM  Present on Admission:   Closed displaced midcervical fracture of left femur with delayed healing   Closed fracture of left hip (HCC)           [1]   Medications Prior to Admission   Medication Sig Dispense Refill Last Dose/Taking    clopidogrel 75 MG Oral Tab Take 1 tablet (75 mg total) by mouth daily.   9/28/2024 at 0800    escitalopram 5 MG Oral Tab Take 1 tablet (5 mg total) by mouth daily. 90 tablet 3 9/28/2024 at 0800    linaGLIPtin (TRADJENTA) 5 mg Oral Tab Take 1 tablet (5 mg total) by mouth daily. 90 tablet 1 9/28/2024 at 0800    atorvastatin 40 MG Oral Tab Take 1 tablet (40 mg total) by mouth nightly. 90 tablet 1 9/27/2024 at 2000    felodipine ER 10 MG Oral Tablet 24 Hr Take 1 tablet (10 mg total) by mouth daily. 90 tablet 3 9/28/2024 at 0800    PANTOPRAZOLE 40 MG Oral Tab EC TAKE 1 TABLET BY MOUTH EVERY  MORNING BEFORE BREAKFAST 90 tablet 3 9/28/2024 at 0800    aspirin 81 MG Oral Tab EC Take 1 tablet (81 mg total) by mouth daily. 90 tablet 3 9/28/2024 at 0800    carvedilol 25 MG Oral Tab Take 1 tablet (25 mg total) by mouth 2 (two) times daily.   9/28/2024 at 0800    acetaminophen 500 MG Oral Tab Take 1 tablet (500 mg total) by mouth daily as needed for Pain.   9/27/2024 at 2000    Cholecalciferol 125 MCG (5000 UT) Oral Tab Take 1 tablet (5,000 Units total) by mouth 2 (two) times daily.   9/28/2024 at 0800    polyethylene glycol, PEG 3350, 17 g Oral Powd Pack daily as needed.   Past Week at \"about 2-3 days ago\"    fluticasone propionate 50 MCG/ACT Nasal Suspension 2 sprays by Nasal route daily. 48 g 3 9/27/2024 at 0800    albuterol (PROAIR HFA) 108 (90 Base) MCG/ACT Inhalation Aero Soln Inhale 2 puffs into the lungs every 4 (four) hours as needed for Wheezing. 3 each 3 9/28/2024 at 0830    Budesonide-Formoterol Fumarate (SYMBICORT) 160-4.5 MCG/ACT Inhalation  Aerosol Inhale 2 puffs into the lungs 2 (two) times daily. 3 each 3 Past Week at \"about 2-3 days ago\"    cetirizine 10 MG Oral Tab Take 1 tablet (10 mg total) by mouth every other day.   9/26/2024   [2]   Current Facility-Administered Medications Ordered in Epic   Medication Dose Route Frequency Provider Last Rate Last Admin    adult 3 in 1 TPN   Intravenous Continuous TPN Praany Michel MD        adult 3 in 1 TPN   Intravenous Continuous TPN Pranay Michel MD   Paused at 10/19/24 0250    insulin regular human (Novolin R, Humulin R) 100 UNIT/ML injection 1-7 Units  1-7 Units Subcutaneous 4 times per day Pranay Michel MD   1 Units at 10/19/24 0000    dilTIAZem 10 mg BOLUS FROM BAG infusion  10 mg Intravenous Q1H PRN Pranay Michel MD   10 mg at 10/17/24 1454    dilTIAZem (cardIZEM) 100 mg in sodium chloride 0.9% 100 mL IVPB-ADDV  2.5-20 mg/hr Intravenous Continuous Pranay Michel MD   Paused at 10/17/24 1643    [Held by provider] dilTIAZem (cardIZEM) tab 30 mg  30 mg Oral 4 times per day Pranay Michel MD        pantoprazole (Protonix) 40 mg in sodium chloride 0.9% PF 10 mL IV push  40 mg Intravenous Q24H Pranay Michel MD   40 mg at 10/18/24 1602    amiodarone (Cordarone) 450 mg in dextrose 5% 250 mL infusion  0.5 mg/min Intravenous Continuous Emerson Julio MD 16.7 mL/hr at 10/19/24 0215 0.5 mg/min at 10/19/24 0215    heparin (Porcine) 1000 UNIT/ML injection 1,500 Units  1.5 mL Intravenous PRN Dialysis José Callahan MD        lidocaine-prilocaine (Emla) 2.5-2.5 % cream   Topical PRN José Callahan MD        sodium chloride 0.9 % IV bolus 100 mL  100 mL Intravenous Q30 Min PRN José Callahan MD        And    albumin human (Albumin) 25% injection 25 g  25 g Intravenous PRN Dialysis José Callahan MD        dextrose 10% infusion (TPN no rate)   Intravenous Continuous PRN Pranay Michel MD        acetaminophen (Ofirmev) 10 mg/mL infusion premix 1,000 mg  1,000 mg Intravenous Q8H  Pranay Michel  mL/hr at 10/19/24 0600 1,000 mg at 10/19/24 0600    heparin (Porcine) 1000 UNIT/ML injection 1,500 Units  1.5 mL Intravenous PRN Dialysis José Callahan MD        lidocaine-prilocaine (Emla) 2.5-2.5 % cream   Topical PRN José Callahan MD        ipratropium-albuterol (Duoneb) 0.5-2.5 (3) MG/3ML inhalation solution 3 mL  3 mL Nebulization 2 times daily Gordon Borden MD   3 mL at 10/19/24 0806    ipratropium-albuterol (Duoneb) 0.5-2.5 (3) MG/3ML inhalation solution 3 mL  3 mL Nebulization Q6H PRN Gordon Borden MD        influenza virus trivalent high dose PF (Fluzone HD) 0.5 mL injection (ages >/= 65 years) 0.5 mL  0.5 mL Intramuscular Prior to discharge Ne Irizarry MD        clopidogrel (Plavix) tab 75 mg  75 mg Oral Daily Herman Phelan MD   75 mg at 10/15/24 0833    [Held by provider] carvedilol (Coreg) tab 25 mg  25 mg Oral BID Lizbeth Rey MD   25 mg at 10/14/24 2034    sevelamer carbonate (Renvela) tab 800 mg  800 mg Oral TID CC Walker Klein MD   800 mg at 10/15/24 0846    [Held by provider] insulin aspart (NovoLOG) 100 Units/mL FlexPen 1-7 Units  1-7 Units Subcutaneous TID CC and HS Lizbeth Rey MD   1 Units at 10/18/24 1824    atorvastatin (Lipitor) tab 40 mg  40 mg Oral Nightly Lizbeth Rey MD   40 mg at 10/14/24 2034    HYDROmorphone (Dilaudid) 1 MG/ML injection 0.1 mg  0.1 mg Intravenous Q2H PRN Lizbeth Rey MD   0.1 mg at 10/12/24 0110    Or    HYDROmorphone (Dilaudid) 1 MG/ML injection 0.2 mg  0.2 mg Intravenous Q2H PRN Lizbeth Rey MD   0.2 mg at 10/19/24 0419    Or    HYDROmorphone (Dilaudid) 1 MG/ML injection 0.4 mg  0.4 mg Intravenous Q2H PRN Lizbeth Rey MD   0.4 mg at 10/17/24 1231    epoetin donna (Epogen, Procrit) 5,000 Units injection  5,000 Units Subcutaneous Once per day on Monday Wednesday Friday Moni Pugh MD   5,000 Units at 10/18/24 1720    metoclopramide (Reglan) 5 mg/mL injection 10 mg  10 mg Intravenous  Q24H PRN Humberto Murphy MD        sodium chloride 0.9 % irrigation    Continuous PRN Humberto Murphy MD   3,000 mL at 10/04/24 1006    sodium chloride 0.9 % irrigation    Continuous PRN Humberto Murphy MD   500 mL at 10/04/24 1006    ondansetron (Zofran) 4 MG/2ML injection 4 mg  4 mg Intravenous Q6H PRN Humberto Murphy MD        diphenhydrAMINE (Benadryl) cap/tab 25 mg  25 mg Oral Q4H PRN Humberto Murphy MD        Or    diphenhydrAMINE (Benadryl) 50 mg/mL  injection 12.5 mg  12.5 mg Intravenous Q4H PRN Humberto Murphy MD        heparin (Porcine) 5000 UNIT/ML injection 5,000 Units  5,000 Units Subcutaneous Q8H [facility] Anil Pang MD   5,000 Units at 10/19/24 0140    dextrose 10% infusion (TPN no rate)   Intravenous Continuous PRN Nora Jones APRN   Stopped at 10/05/24 1910    pancrelipase (Lip-Prot-Amyl) (Zenpep) DR particles cap 10,000 Units  10,000 Units Per G Tube PRN Nora Jones APRN        And    sodium bicarbonate tab 325 mg  325 mg Oral PRN Nora Jones APRN        senna (Senokot) 8.8 MG/5ML oral syrup 17.6 mg  10 mL Oral BID Humberto Murphy MD   17.6 mg at 10/13/24 1246    mineral oil-white petrolatum (Artificial Tears) 83-15 % ophthalmic ointment 1 Application  1 Application Both Eyes Nightly Humberto Murphy MD   1 Application at 10/18/24 2209    lidocaine-menthol 4-1 % patch 2 patch  2 patch Transdermal Daily Humberto Murphy MD   2 patch at 10/12/24 0949    hydrALAzine (Apresoline) 20 mg/mL injection 10 mg  10 mg Intravenous Q6H PRN Humberto Murphy MD   10 mg at 10/10/24 0414    labetalol (Trandate) 5 mg/mL injection 10 mg  10 mg Intravenous Q4H PRN Humberto Murphy, MD   10 mg at 10/04/24 1426    acetaminophen (Tylenol) 160 MG/5ML oral liquid 500 mg  500 mg Per NG Tube Daily Humberto Cabrera MD        acetaminophen (Tylenol) tab 650 mg  650 mg Oral Q4H PRHumberto Escalera MD   650 mg at 10/14/24 2034    Or    acetaminophen (Tylenol) 160 MG/5ML oral liquid 650 mg  650  mg Per NG Tube Q4H PRN Humberto Murphy MD   650 mg at 10/06/24 0836    Or    acetaminophen (Tylenol) rectal suppository 650 mg  650 mg Rectal Q4H PRN Humberto Murphy MD        polyethylene glycol (PEG 3350) (Miralax) 17 g oral packet 17 g  17 g Per NG Tube Daily PRN Humberto Murphy MD        bisacodyl (Dulcolax) 10 MG rectal suppository 10 mg  10 mg Rectal Daily PRN Humberto Murphy MD        albuterol (Ventolin HFA) 108 (90 Base) MCG/ACT inhaler 2 puff  2 puff Inhalation Q4H PRN Humberto Murphy MD        fluticasone propionate (Flonase) 50 MCG/ACT nasal suspension 2 spray  2 spray Nasal Daily PRN Humberto Murphy MD   2 spray at 10/07/24 1211    glucose (Dex4) 15 GM/59ML oral liquid 15 g  15 g Oral Q15 Min PRHumberto Escalera MD        Or    glucose (Glutose) 40% oral gel 15 g  15 g Oral Q15 Min PRHumberto Escalera MD        Or    glucose-vitamin C (Dex-4) chewable tab 4 tablet  4 tablet Oral Q15 Min PRHumberto Escalera MD        Or    dextrose 50% injection 50 mL  50 mL Intravenous Q15 Min PRHumberto Escalera MD   50 mL at 10/16/24 0009    Or    glucose (Dex4) 15 GM/59ML oral liquid 30 g  30 g Oral Q15 Min PRHumberto Escalera MD        Or    glucose (Glutose) 40% oral gel 30 g  30 g Oral Q15 Min Humberto Cabrera MD        Or    glucose-vitamin C (Dex-4) chewable tab 8 tablet  8 tablet Oral Q15 Min PRHumberto Escalera MD        fluticasone-salmeterol (Advair Diskus) 250-50 MCG/ACT inhaler 1 puff  1 puff Inhalation BID Humberto Murphy MD   1 puff at 10/18/24 2206     No current Epic-ordered outpatient medications on file.   [3]   Allergies  Allergen Reactions    Adhesive Tape OTHER (SEE COMMENTS)     Severe rashes    Dust Mite Extract RASH

## 2024-10-19 NOTE — ANESTHESIA POSTPROCEDURE EVALUATION
Patient: Ollie Hernández    Procedure Summary       Date: 10/19/24 Room / Location: [facility] ENDOSCOPY 01 / [facility] ENDOSCOPY    Anesthesia Start: 1033 Anesthesia Stop:     Procedures:       ESOPHAGOGASTRODUODENOSCOPY (EGD)      PERCUTANEOUS ENDOSCOPIC GASTROSTOMY PLACEMENT/JEJUNOSTOMY TUBE PLACEMENT Diagnosis: (PEG placement, gastritis)    Surgeons: Yash Sheppard MD Anesthesiologist: Jayne Slater MD    Anesthesia Type: MAC ASA Status: 4            Anesthesia Type: MAC    Vitals Value Taken Time   BP 81/26 10/19/24 1100   Temp 98.8 °F (37.1 °C) 10/19/24 1100   Pulse 74 10/19/24 1100   Resp 22 10/19/24 1100   SpO2 96 % 10/19/24 1100       [facility] AN Post Evaluation:   Patient Evaluated in PACU  Patient Participation: complete - patient participated  Level of Consciousness: awake and alert  Pain Score: 0  Pain Management: adequate  Airway Patency:patent  Dental exam unchanged from preop  Yes    Nausea/Vomiting: none  Cardiovascular Status: hypertensive  Respiratory Status: nasal cannula  Postoperative Hydration euvolemic      JAYNE SLATER MD  10/19/2024 11:02 AM

## 2024-10-19 NOTE — PROGRESS NOTES
[facility]      [facility] Cardiology Progress Note    Ollie Hernández Patient Status:  Inpatient    1945 MRN [id number]   Location [facility] 2W/SW Attending Pranay Michel MD   Hosp Day # 21 PCP Wili Parmar MD       Impression/Plan:  79 year old male presenting with:    Impression:  Hip fracture Left - S/P L anterior total hip arthroplasty 10/4/24  Acute resp failure, aspiration and/or pulmonary edema; intubated 2024 extubated 10/6  ESRD on HD  DM  PAF, currently SR. S/p watchman device 24  HTN  HL, on statin PTA  Acute on chronic diastolic CHF  CAD s/p Eustace circ 24   Coffee grounds in OG tube  -Slight decline in Hb post-op  Elevated troponin, likely demand ischemia  VT-ns  1:24 AM 10/14/24      Plan:  - Cont carvedilol for rate control BP. Follow BP as restarts PRN labetalol    - Cont statin   - Plavix restarted 10/8. Hb stable taking PO  monitor for bleeding (recent Watchman implant 2024) consider restarting ASA 81 if Hb con't to remain stable     - Monitor on tele  - Reviewed w/ wife and RN's  at bedside   - Not clear cause for sinus tachy possibly pain , hypotension  BB held rebound  , low volume. Hb has been stable.   - EF 55%  TERRI 24 restart BB  when able taking PO   - Dialysis as scheduled    - Med Rx  limited by NPO  and hypotension     Con't , Amiodarone Gtt @  .5 hr per Dr Phelan    D/W Dr Michel      Hold Diltiazem and  Dig.   - Plavix on hold for possible g-tube restart when able for recent stent          Subjective:     Feels the same.      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)    Atherosclerosis of native arteries of extremities with intermittent claudication, bilateral legs (HCC)    Primary hypertension    Smokers' cough (HCC)    Unstable angina (HCC)    Lower GI bleed    ESRD (end stage renal disease) on dialysis (HCC)    PAF (paroxysmal atrial fibrillation) (HCC)    AVM (arteriovenous malformation) of colon    ABLA (acute blood loss anemia)    Rectal bleeding    Anticoagulated    Antiplatelet or antithrombotic long-term use    Lower GI bleeding    ESRD on hemodialysis (HCC)    GI bleed    Closed displaced midcervical fracture of left femur with delayed healing    ESRD (end stage renal disease) (HCC)    Closed fracture of left hip (HCC)    Hyperphosphatemia    Respiratory failure (HCC)    Anemia    Palliative care by specialist       Objective:   Temp: 98.3 °F (36.8 °C)  Pulse: 64  Resp: 17  BP: 141/50    Intake/Output:     Intake/Output Summary (Last 24 hours) at 10/19/2024 1051  Last data filed at 10/19/2024 1036  Gross per 24 hour   Intake 293.7 ml   Output --   Net 293.7 ml         Last 3 Weights   10/18/24 0441 131 lb 1.6 oz (59.5 kg)   10/05/24 0600 144 lb 10 oz (65.6 kg)   10/04/24 0600 145 lb 8.1 oz (66 kg)   10/02/24 1310 156 lb 4.9 oz (70.9 kg)   10/02/24 0800 143 lb 11.8 oz (65.2 kg)   10/01/24 0600 160 lb 0.9 oz (72.6 kg)   09/30/24 0400 164 lb 0.4 oz (74.4 kg)   09/28/24 1649 155 lb 11.2 oz (70.6 kg)   08/22/24 1311 159 lb 12.8 oz (72.5 kg)   08/20/24 0933 158 lb (71.7 kg)       Tele:    A Flutter > NSR     Physical Exam:    General: Awake   HEENT: Normocephalic, anicteric sclera NG tube out   Neck: supple  Cardiac: Regular rhythm. S1, S2 normal. No murmur, pericardial rub, S3, thrill, heave or extra cardiac sounds.  Lungs: Coarse breath sounds bilat  Abdomen: Soft, non-distended  Extremities: Without clubbing, cyanosis  or edema.  SCD boots Missing digits R hand   Neurologic: Alert+confused   Skin: Warm and dry.     Laboratory/Data:    Labs:         Recent Labs   Lab 10/14/24  0427 10/16/24  0421 10/17/24  0412 10/18/24  0633 10/19/24  0723   WBC 19.1* 15.6* 14.6* 17.0* 12.8*   HGB 8.5* 9.4* 9.8* 9.5* 8.0*   MCV 96.1 94.9 100.6* 104.7* 96.9   .0 421.0 385.0 255.0 235.0       Recent Labs   Lab 10/14/24  0427 10/15/24  1222 10/16/24  0421 10/17/24  0412 10/18/24  0849 10/19/24  0723    139 138 138 133* 134*   K 4.7 4.9 4.7 4.6 4.2 3.6    101 102 100 100 98   CO2 24.0 24.0 25.0 26.0 19.0* 27.0   * 86* 97* 57* 72* 49*   CREATSERUM 8.16* 7.54* 8.70* 6.37* 7.66* 5.18*   CA 10.1 10.0 10.1 9.9 9.4 9.2   MG 2.5  --  2.4 2.2 2.1 1.8   PHOS  --   --  7.6* 7.4* 6.4*  --    * 207* 122* 177* 203* 133*       Recent Labs   Lab 10/17/24  0412   ALT 19   AST 39*   ALB 3.8       No results for input(s): \"TROP\" in the last 168 hours.    Allergies:   Allergies   Allergen Reactions    Adhesive Tape OTHER (SEE COMMENTS)     Severe rashes    Dust Mite Extract RASH       Medications:  Current Facility-Administered Medications   Medication Dose Route Frequency    adult 3 in 1 TPN   Intravenous Continuous TPN    adult 3 in 1 TPN   Intravenous Continuous TPN    insulin regular human (Novolin R, Humulin R) 100 UNIT/ML injection 1-7 Units  1-7 Units Subcutaneous 4 times per day    dilTIAZem 10 mg BOLUS FROM BAG infusion  10 mg Intravenous Q1H PRN    dilTIAZem (cardIZEM) 100 mg in sodium chloride 0.9% 100 mL IVPB-ADDV  2.5-20 mg/hr Intravenous Continuous    [Held by provider] dilTIAZem (cardIZEM) tab 30 mg  30 mg Oral 4 times per day    pantoprazole (Protonix) 40 mg in sodium chloride 0.9% PF 10 mL IV push  40 mg Intravenous Q24H    amiodarone (Cordarone) 450 mg in dextrose 5% 250 mL infusion  0.5 mg/min Intravenous Continuous    heparin (Porcine) 1000 UNIT/ML injection 1,500 Units  1.5 mL Intravenous PRN Dialysis     lidocaine-prilocaine (Emla) 2.5-2.5 % cream   Topical PRN    sodium chloride 0.9 % IV bolus 100 mL  100 mL Intravenous Q30 Min PRN    And    albumin human (Albumin) 25% injection 25 g  25 g Intravenous PRN Dialysis    dextrose 10% infusion (TPN no rate)   Intravenous Continuous PRN    acetaminophen (Ofirmev) 10 mg/mL infusion premix 1,000 mg  1,000 mg Intravenous Q8H    heparin (Porcine) 1000 UNIT/ML injection 1,500 Units  1.5 mL Intravenous PRN Dialysis    lidocaine-prilocaine (Emla) 2.5-2.5 % cream   Topical PRN    ipratropium-albuterol (Duoneb) 0.5-2.5 (3) MG/3ML inhalation solution 3 mL  3 mL Nebulization 2 times daily    ipratropium-albuterol (Duoneb) 0.5-2.5 (3) MG/3ML inhalation solution 3 mL  3 mL Nebulization Q6H PRN    influenza virus trivalent high dose PF (Fluzone HD) 0.5 mL injection (ages >/= 65 years) 0.5 mL  0.5 mL Intramuscular Prior to discharge    clopidogrel (Plavix) tab 75 mg  75 mg Oral Daily    [Held by provider] carvedilol (Coreg) tab 25 mg  25 mg Oral BID    sevelamer carbonate (Renvela) tab 800 mg  800 mg Oral TID CC    [Held by provider] insulin aspart (NovoLOG) 100 Units/mL FlexPen 1-7 Units  1-7 Units Subcutaneous TID CC and HS    atorvastatin (Lipitor) tab 40 mg  40 mg Oral Nightly    HYDROmorphone (Dilaudid) 1 MG/ML injection 0.1 mg  0.1 mg Intravenous Q2H PRN    Or    HYDROmorphone (Dilaudid) 1 MG/ML injection 0.2 mg  0.2 mg Intravenous Q2H PRN    Or    HYDROmorphone (Dilaudid) 1 MG/ML injection 0.4 mg  0.4 mg Intravenous Q2H PRN    epoetin donna (Epogen, Procrit) 5,000 Units injection  5,000 Units Subcutaneous Once per day on Monday Wednesday Friday    metoclopramide (Reglan) 5 mg/mL injection 10 mg  10 mg Intravenous Q24H PRN    sodium chloride 0.9 % irrigation    Continuous PRN    sodium chloride 0.9 % irrigation    Continuous PRN    ondansetron (Zofran) 4 MG/2ML injection 4 mg  4 mg Intravenous Q6H PRN    diphenhydrAMINE (Benadryl) cap/tab 25 mg  25 mg Oral Q4H PRN    Or     diphenhydrAMINE (Benadryl) 50 mg/mL  injection 12.5 mg  12.5 mg Intravenous Q4H PRN    heparin (Porcine) 5000 UNIT/ML injection 5,000 Units  5,000 Units Subcutaneous Q8H STEPHANIE    dextrose 10% infusion (TPN no rate)   Intravenous Continuous PRN    pancrelipase (Lip-Prot-Amyl) (Zenpep) DR particles cap 10,000 Units  10,000 Units Per G Tube PRN    And    sodium bicarbonate tab 325 mg  325 mg Oral PRN    senna (Senokot) 8.8 MG/5ML oral syrup 17.6 mg  10 mL Oral BID    mineral oil-white petrolatum (Artificial Tears) 83-15 % ophthalmic ointment 1 Application  1 Application Both Eyes Nightly    lidocaine-menthol 4-1 % patch 2 patch  2 patch Transdermal Daily    hydrALAzine (Apresoline) 20 mg/mL injection 10 mg  10 mg Intravenous Q6H PRN    labetalol (Trandate) 5 mg/mL injection 10 mg  10 mg Intravenous Q4H PRN    acetaminophen (Tylenol) 160 MG/5ML oral liquid 500 mg  500 mg Per NG Tube Daily PRN    acetaminophen (Tylenol) tab 650 mg  650 mg Oral Q4H PRN    Or    acetaminophen (Tylenol) 160 MG/5ML oral liquid 650 mg  650 mg Per NG Tube Q4H PRN    Or    acetaminophen (Tylenol) rectal suppository 650 mg  650 mg Rectal Q4H PRN    polyethylene glycol (PEG 3350) (Miralax) 17 g oral packet 17 g  17 g Per NG Tube Daily PRN    bisacodyl (Dulcolax) 10 MG rectal suppository 10 mg  10 mg Rectal Daily PRN    albuterol (Ventolin HFA) 108 (90 Base) MCG/ACT inhaler 2 puff  2 puff Inhalation Q4H PRN    fluticasone propionate (Flonase) 50 MCG/ACT nasal suspension 2 spray  2 spray Nasal Daily PRN    glucose (Dex4) 15 GM/59ML oral liquid 15 g  15 g Oral Q15 Min PRN    Or    glucose (Glutose) 40% oral gel 15 g  15 g Oral Q15 Min PRN    Or    glucose-vitamin C (Dex-4) chewable tab 4 tablet  4 tablet Oral Q15 Min PRN    Or    dextrose 50% injection 50 mL  50 mL Intravenous Q15 Min PRN    Or    glucose (Dex4) 15 GM/59ML oral liquid 30 g  30 g Oral Q15 Min PRN    Or    glucose (Glutose) 40% oral gel 30 g  30 g Oral Q15 Min PRN    Or     glucose-vitamin C (Dex-4) chewable tab 8 tablet  8 tablet Oral Q15 Min PRN    fluticasone-salmeterol (Advair Diskus) 250-50 MCG/ACT inhaler 1 puff  1 puff Inhalation BID     Facility-Administered Medications Ordered in Other Encounters   Medication Dose Route Frequency    propofol (Diprivan) 10 mg/mL infusion premix   Intravenous Continuous PRN    sodium chloride 0.9% infusion   Intravenous Continuous PRN    EPHEDrine 50 MG/ML injection   Intravenous PRN

## 2024-10-19 NOTE — CM/SW NOTE
Submitted clinical via [employer] portal.  [employer] Case/Auth ID is [id number].  Final insurance authorization is pending at this time.      SW/CM assigned to the case will continue to follow auth status.      Maria Isabel John, DSC

## 2024-10-19 NOTE — PRE-SEDATION ASSESSMENT
Physician Pre-Sedation Assessment    Pre-Sedation Assessment:    Sedation History: No Previous Sedaton Recieved and Airway Assessed    Cardiac: normal S1, S2  Respiratory: breath sounds clear bilaterally   Abdomen: soft, BS (+), non-tender    ASA Classification: 4. Patient with severe systemic disease that is constant threat to life.    Plan: IV Sedation

## 2024-10-19 NOTE — DIETARY NOTE
ADULT NUTRITION UPDATE NOTE    Pt is at high nutrition risk.  Pt does not meet malnutrition criteria.      RECOMMENDATIONS TO MD:    See nutrition intervention for Enteral Nutrition (EN) rx. Peripheral PN to stop after current bag completes this pm as EN advances toward goal.     ADMITTING DIAGNOSIS:  Closed fracture of left hip, initial encounter ([facility]) [S72.002A]  PERTINENT PAST MEDICAL HISTORY:   Past Medical History:    Anemia    Asthma ([facility])    Back problem    BPH (benign prostatic hyperplasia)    Calculus of kidney    Cataract    Coronary atherosclerosis    Diabetes ([facility])    ESRD (end stage renal disease) on dialysis ([facility])    Essential hypertension    High blood pressure    High cholesterol    History of blood transfusion    Hyperlipidemia    Neuropathy    hands and feet    KRAIG on CPAP    Renal disorder    Sleep apnea    Visual impairment    glasses    Vocal cord paralysis, unilateral partial       PATIENT STATUS: Initial 10/02/24: Pt admit for displaced left femoral neck fracture with uncontrolled pain-possible surgery planned. Pt intubated on 9/29 for respiratory failure. There was a concern for GI bleed thus held off on tube feeds. PMH as above.and notable for ESRD HD.   Pt assessed due to consult for tube feeding. Pt NPO day# 4. (See brief note per RD 9/30). Discussed with RN last pm re: potential tube feeding start pending Ortho surgeon jonathan . GI status improved. Propofol low dose in progress (~220 kcals/lipids) Pt screened at no nutrition risk on admit however weight hx reflects a 10% wt loss over past ~ 1 month: this is a severe weight loss. Will clarify with family as able.  Current BMI reflects wt/ht wnl. Diet hx to be obtained.   Appropriate to start Nepro formula-see orders below. No documentation of tube feed start- possible holding for surgery or possible extubation? Will discuss at rounds today. Addendum: TF ordered late last pm- did not start.    10/7/24: s/p L FRANCE 10/4. Wound vac to L.hip. Tube  feeds initiated 10/2 am but stopped 10/6 with extubation and OG tube out. Calculated ave tf intake 10/2-10/6 and only ~ 500 kcals/day- EN was held for high GRV10/3. Overall nutrition intake poor- not safe for po intake today s/p SLP BSSE. Met with Pt and spouse. Pt c/o \"sore throat\" so does not want to eat/swallow. Discussed nutrition plan of care. Per spouse pt had good appetite pta following renal diet, higher in protein per renal MD recommendations. Feels wt loss was not intentional but had adjusted diet somewhat for higher protein intake ie more fish, chicken, protein bars. Hope to advance diet tomorrow vs need replace feeding tube.   10/11/24 UPDATE: Pt sleeping and undergoing HD at time of visit. Spouse at bedside. Spouse reports pt's appetite/intake is improving since extubation. Pt skipped lunch today r/t gets nauseated with HD. Spouse ordering dinner tray now. Declining ONS at this time. Reports pt is unhappy with thickened liquids. Per spouse typical dry wt 154 lbs.      10/16/2024 Update:   Received MD consult to initiate/manage CPN/PPN  Swallow eval via VFSS noted. SLP recommended NPO with alternate route for Nutrition/hydration d/t severity of dysphagia with aspiration. Discussing plan PEG placement not until Saturday 10/19.  Labs and meds reviewed.    Re-visited pt, on going Hemodialysis at bedside, using 2K Bath. Pt appears lethargic. RN reports recently given dilaudid. Currently with IV D5 1/2 NS @ 45 ml/hr ( 1020 ml/day).    Provision for PPN indicated until able to utilize Enteral Nutrition (EN). Recommend to discontinue IVF when PPN starts tonight via peripheral line. No K, Mg and P in PPN solution and using low volume 1200 ml PPN. May likely need to re-eval if phos binder (renvela) is still needed while pt is NPO, discussed with RN. Reqeusted for new wt. Also d/w with RNs to watch for PPN initiation intolerance.   10/19/2024 Update: S/p PEG placement today and received consult to order and  manage. PPN in progress and to be completed today ~ 2100 (last bag). Initiate renal failure formula Nepro with limited water flushes for pt on dialysis, anuric. Monitor tolerance. Once tolerance established may change to bolus feeds. New scaled wt 10/18 reflects a 15% wt loss over 1 month: severe wt loss.     FOOD/NUTRITION RELATED HISTORY:  Appetite: Good PTA. Improving.  Intake: prior to NPO pt was eating 10-60% , average meal intake 29%--inadequate to meet nutrition needs.   Intake Meeting Needs: EN to meet needs once to goal rate.   Percent Meals Eaten (last 3 days)       None        Food Allergies: No Known Food Allergies (NKFA)  Cultural/Ethnic/[employer] Preferences: Not Obtained    GASTROINTESTINAL: +BM 10 14 soft loose brown med stool x 1 , severe dysphagia per SLP via VFSS.   : U/O : anuric. On HD.     MEDICATIONS: reviewed; Continuous:  PPN and amiodarone  Scheduled:    insulin regular human  1-7 Units Subcutaneous 4 times per day    [Held by provider] dilTIAZem  30 mg Oral 4 times per day    pantoprazole  40 mg Intravenous Q24H    ipratropium-albuterol  3 mL Nebulization 2 times daily    clopidogrel  75 mg Oral Daily    [Held by provider] carvedilol  25 mg Oral BID    sevelamer carbonate  800 mg Oral TID CC    [Held by provider] insulin aspart  1-7 Units Subcutaneous TID CC and HS    atorvastatin  40 mg Oral Nightly    epoetin donna  5,000 Units Subcutaneous Once per day on Monday Wednesday Friday    heparin  5,000 Units Subcutaneous Q8H STEPHANIE    senna  10 mL Oral BID    mineral oil-white petrolatum  1 Application Both Eyes Nightly    lidocaine-menthol  2 patch Transdermal Daily    fluticasone-salmeterol  1 puff Inhalation BID   PRN: meds for constipation I place.   LABS: reviewed; labs c/w ESRD on HD; phos high-on phos binder and low phos TF formula starting today 10/19  Recent Labs     10/16/24  0421 10/17/24  0412 10/18/24  0849 10/19/24  0723   * 177* 203* 133*   BUN 97* 57* 72* 49*   CREATSERUM  8.70* 6.37* 7.66* 5.18*   CA 10.1 9.9 9.4 9.2   MG 2.4 2.2 2.1 1.8    138 133* 134*   K 4.7 4.6 4.2 3.6    100 100 98   CO2 25.0 26.0 19.0* 27.0   PHOS 7.6* 7.4* 6.4*  --    OSMOCALC 317* 306* 303* 293     NUTRITION RELATED PHYSICAL FINDINGS:  - Nutrition Focused Physical Exam (NFPE):  severe wt loss was charted at initial assessment    - Fluid Accumulation: non-pitting Right Lower extremity and generalized see RN documentation for details  - Skin Integrity: surgical wound(s) hip with vac.  see RN documentation for details    ANTHROPOMETRICS:  HT:  5'4\"   WT: 59.5 kg (131 lb 1.6 oz)--10/18 new wt.   BMI: Body mass index is 22.49 kg/m².  BMI CLASSIFICATION: 19-24.9 kg/m2 - WNL  IBW: 130  lbs        100% IBW  Usual Body Wt: 154 lbs (typical dry wt)      85% UBW (15% loss/1 month: severe loss)     WEIGHT HISTORY:  Patient Weight(s) for the past 336 hrs:   Weight   10/18/24 0441 59.5 kg (131 lb 1.6 oz)     Wt Readings from Last 10 Encounters:   10/18/24 59.5 kg (131 lb 1.6 oz)   08/22/24 72.5 kg (159 lb 12.8 oz)   08/20/24 71.7 kg (158 lb)   08/13/24 65.6 kg (144 lb 11.2 oz)   08/06/24 70.8 kg (156 lb)   08/01/24 66.5 kg (146 lb 8 oz)   06/27/24 73.9 kg (163 lb)   06/13/24 71 kg (156 lb 8 oz)   06/05/24 68.2 kg (150 lb 4.8 oz)   05/28/24 74.2 kg (163 lb 9.6 oz)     NUTRITION DIAGNOSIS/PROBLEM:   Inadequate oral intake related to Decreased ability to consume sufficient energy in the setting of intubation as evidenced by NPO, 0 nutrition intake.   Nutrition Diagnosis Progress:  Improvement (continue) , npo d/t dysphagia, PPN support therapy was initiated and transition to EN 10/19 post peg placement.     NUTRITION INTERVENTION:     NUTRITION PRESCRIPTION:   Estimated Nutrition needs: --dosing wt of 59.5  kg - wt taken on 10/18  Calories: 0993-3406 calories/day (30-35 calories per kg Dosing wt) higher needs d/t HD and wt loss.   Protein: 71-89 g protein/day (1.2-1.5  g protein/kg Dosing wt)   Fluid Needs:  Minimal to keep adequate hydration. Adjust per clinical status (ESRD, anuric)     - Diet:       Procedures    NPO   Enteral Nutrition via PEG: Nepro 25 ml/hr; advance 10 ml q 8 hrs to goal 50 ml/hr X ave 22 hr infusion time, FWF: 50 ml q 4 hrs (300 ml). Total nutrition= 1980 kcals, 89 g protein, 803 ml h20, 1103 ml total H20 including FWF, >100% RDI's. If change to bolus feeds: 4.5-5.0 cartons/day.     - Medical Food Supplements: NPO  - Vitamin and mineral supplements: npo  - Feeding assistance: NPO  - Nutrition education: assess education needs   - Coordination of nutrition care: discussed with RN staff.   - Discharge and transfer of nutrition care to new setting or provider: JESUS current plans. 10/21 expected discharge date.       MONITOR AND EVALUATE/NUTRITION GOALS:  - Food and Nutrient Administration:    Monitor: EN tolerance, adequacy, need to adjust and potential change to bolus feeds before discharge.   - Anthropometric Measurement:    Monitor weight  - Nutrition Goals:    halt wt loss, labs within acceptable limits, support body systems, and EN to meet >80% of needs, maintain wt within IBW, Good tolerance to EN. Potential transition to bolus feeds if tolerates well and appropriate.       DIETITIAN FOLLOW UP: RD to follow      Tiana Guevara RD, LDN, [facility] ([id number])

## 2024-10-19 NOTE — INTERVAL H&P NOTE
Pre-op Diagnosis: none given    The above referenced H&P was reviewed by Yash Sheppard MD on 10/19/2024, the patient was examined and no significant changes have occurred in the patient's condition since the H&P was performed.  I discussed with the patient and/or legal representative the potential benefits, risks and side effects of this procedure; the likelihood of the patient achieving goals; and potential problems that might occur during recuperation.  I discussed reasonable alternatives to the procedure, including risks, benefits and side effects related to the alternatives and risks related to not receiving this procedure.  We will proceed with procedure as planned.

## 2024-10-20 LAB
ANION GAP SERPL CALC-SCNC: 11 MMOL/L (ref 0–18)
BUN BLD-MCNC: 59 MG/DL (ref 9–23)
BUN/CREAT SERPL: 9.1 (ref 10–20)
CALCIUM BLD-MCNC: 9.1 MG/DL (ref 8.7–10.4)
CHLORIDE SERPL-SCNC: 96 MMOL/L (ref 98–112)
CO2 SERPL-SCNC: 26 MMOL/L (ref 21–32)
CREAT BLD-MCNC: 6.45 MG/DL
DEPRECATED HBV CORE AB SER IA-ACNC: 2087 NG/ML
DEPRECATED RDW RBC AUTO: 57.1 FL (ref 35.1–46.3)
EGFRCR SERPLBLD CKD-EPI 2021: 8 ML/MIN/1.73M2 (ref 60–?)
ERYTHROCYTE [DISTWIDTH] IN BLOOD BY AUTOMATED COUNT: 16.6 % (ref 11–15)
GLUCOSE BLD-MCNC: 202 MG/DL (ref 70–99)
GLUCOSE BLDC GLUCOMTR-MCNC: 194 MG/DL (ref 70–99)
GLUCOSE BLDC GLUCOMTR-MCNC: 196 MG/DL (ref 70–99)
GLUCOSE BLDC GLUCOMTR-MCNC: 207 MG/DL (ref 70–99)
GLUCOSE BLDC GLUCOMTR-MCNC: 278 MG/DL (ref 70–99)
HCT VFR BLD AUTO: 25.4 %
HGB BLD-MCNC: 8.5 G/DL
IRON SATN MFR SERPL: 11 %
IRON SERPL-MCNC: 16 UG/DL
MAGNESIUM SERPL-MCNC: 1.8 MG/DL (ref 1.6–2.6)
MCH RBC QN AUTO: 31.8 PG (ref 26–34)
MCHC RBC AUTO-ENTMCNC: 33.5 G/DL (ref 31–37)
MCV RBC AUTO: 95.1 FL
OSMOLALITY SERPL CALC.SUM OF ELEC: 298 MOSM/KG (ref 275–295)
PHOSPHATE SERPL-MCNC: 5.3 MG/DL (ref 2.4–5.1)
PLATELET # BLD AUTO: 243 10(3)UL (ref 150–450)
POTASSIUM SERPL-SCNC: 3.6 MMOL/L (ref 3.5–5.1)
RBC # BLD AUTO: 2.67 X10(6)UL
SODIUM SERPL-SCNC: 133 MMOL/L (ref 136–145)
TIBC SERPL-MCNC: 150 UG/DL (ref 250–425)
TRANSFERRIN SERPL-MCNC: 101 MG/DL (ref 215–365)
WBC # BLD AUTO: 13.3 X10(3) UL (ref 4–11)

## 2024-10-20 PROCEDURE — 99233 SBSQ HOSP IP/OBS HIGH 50: CPT | Performed by: HOSPITALIST

## 2024-10-20 PROCEDURE — 99232 SBSQ HOSP IP/OBS MODERATE 35: CPT | Performed by: INTERNAL MEDICINE

## 2024-10-20 NOTE — PLAN OF CARE
Problem: Patient Centered Care  Goal: Patient preferences are identified and integrated in the patient's plan of care  Description: Interventions:  - What would you like us to know as we care for you?   - Provide timely, complete, and accurate information to patient/family  - Incorporate patient and family knowledge, values, beliefs, and cultural backgrounds into the planning and delivery of care  - Encourage patient/family to participate in care and decision-making at the level they choose  - Honor patient and family perspectives and choices  Outcome: Progressing     Problem: Diabetes/Glucose Control  Goal: Glucose maintained within prescribed range  Description: INTERVENTIONS:  - Monitor Blood Glucose as ordered  - Assess for signs and symptoms of hyperglycemia and hypoglycemia  - Administer ordered medications to maintain glucose within target range  - Assess barriers to adequate nutritional intake and initiate nutrition consult as needed  - Instruct patient on self management of diabetes  Outcome: Progressing     Problem: PAIN - ADULT  Goal: Verbalizes/displays adequate comfort level or patient's stated pain goal  Description: INTERVENTIONS:  - Encourage pt to monitor pain and request assistance  - Assess pain using appropriate pain scale  - Administer analgesics based on type and severity of pain and evaluate response  - Implement non-pharmacological measures as appropriate and evaluate response  - Consider cultural and social influences on pain and pain management  - Manage/alleviate anxiety  - Utilize distraction and/or relaxation techniques  - Monitor for opioid side effects  - Notify MD/LIP if interventions unsuccessful or patient reports new pain  - Anticipate increased pain with activity and pre-medicate as appropriate  Outcome: Progressing     Problem: SAFETY ADULT - FALL  Goal: Free from fall injury  Description: INTERVENTIONS:  - Assess pt frequently for physical needs  - Identify cognitive and  physical deficits and behaviors that affect risk of falls.  - Hanna fall precautions as indicated by assessment.  - Educate pt/family on patient safety including physical limitations  - Instruct pt to call for assistance with activity based on assessment  - Modify environment to reduce risk of injury  - Provide assistive devices as appropriate  - Consider OT/PT consult to assist with strengthening/mobility  - Encourage toileting schedule  Outcome: Progressing     Problem: DISCHARGE PLANNING  Goal: Discharge to home or other facility with appropriate resources  Description: INTERVENTIONS:  - Identify barriers to discharge w/pt and caregiver  - Include patient/family/discharge partner in discharge planning  - Arrange for needed discharge resources and transportation as appropriate  - Identify discharge learning needs (meds, wound care, etc)  - Arrange for interpreters to assist at discharge as needed  - Consider post-discharge preferences of patient/family/discharge partner  - Complete POLST form as appropriate  - Assess patient's ability to be responsible for managing their own health  - Refer to Case Management Department for coordinating discharge planning if the patient needs post-hospital services based on physician/LIP order or complex needs related to functional status, cognitive ability or social support system  Outcome: Progressing     Problem: CARDIOVASCULAR - ADULT  Goal: Maintains optimal cardiac output and hemodynamic stability  Description: INTERVENTIONS:  - Monitor vital signs, rhythm, and trends  - Monitor for bleeding, hypotension and signs of decreased cardiac output  - Evaluate effectiveness of vasoactive medications to optimize hemodynamic stability  - Monitor arterial and/or venous puncture sites for bleeding and/or hematoma  - Assess quality of pulses, skin color and temperature  - Assess for signs of decreased coronary artery perfusion - ex. Angina  - Evaluate fluid balance, assess for  edema, trend weights  Outcome: Progressing  Goal: Absence of cardiac arrhythmias or at baseline  Description: INTERVENTIONS:  - Continuous cardiac monitoring, monitor vital signs, obtain 12 lead EKG if indicated  - Evaluate effectiveness of antiarrhythmic and heart rate control medications as ordered  - Initiate emergency measures for life threatening arrhythmias  - Monitor electrolytes and administer replacement therapy as ordered  Outcome: Progressing     Problem: RESPIRATORY - ADULT  Goal: Achieves optimal ventilation and oxygenation  Description: INTERVENTIONS:  - Assess for changes in respiratory status  - Assess for changes in mentation and behavior  - Position to facilitate oxygenation and minimize respiratory effort  - Oxygen supplementation based on oxygen saturation or ABGs  - Provide Smoking Cessation handout, if applicable  - Encourage broncho-pulmonary hygiene including cough, deep breathe, Incentive Spirometry  - Assess the need for suctioning and perform as needed  - Assess and instruct to report SOB or any respiratory difficulty  - Respiratory Therapy support as indicated  - Manage/alleviate anxiety  - Monitor for signs/symptoms of CO2 retention  Outcome: Progressing     Problem: GASTROINTESTINAL - ADULT  Goal: Maintains or returns to baseline bowel function  Description: INTERVENTIONS:  - Assess bowel function  - Maintain adequate hydration with IV or PO as ordered and tolerated  - Evaluate effectiveness of GI medications  - Encourage mobilization and activity  - Obtain nutritional consult as needed  - Establish a toileting routine/schedule  - Consider collaborating with pharmacy to review patient's medication profile  Outcome: Progressing

## 2024-10-20 NOTE — PLAN OF CARE
Peg tube in place. Feeding started per order now at goal of 50 gtt with Q4 50mL flushes. Pt c/o of pain PRN meds given. Call light within reach and fall precautions in place.      Problem: SAFETY ADULT - FALL  Goal: Free from fall injury  Description: INTERVENTIONS:  - Assess pt frequently for physical needs  - Identify cognitive and physical deficits and behaviors that affect risk of falls.  - [place] fall precautions as indicated by assessment.  - Educate pt/family on patient safety including physical limitations  - Instruct pt to call for assistance with activity based on assessment  - Modify environment to reduce risk of injury  - Provide assistive devices as appropriate  - Consider OT/PT consult to assist with strengthening/mobility  - Encourage toileting schedule  Outcome: Progressing     Problem: DISCHARGE PLANNING  Goal: Discharge to home or other facility with appropriate resources  Description: INTERVENTIONS:  - Identify barriers to discharge w/pt and caregiver  - Include patient/family/discharge partner in discharge planning  - Arrange for needed discharge resources and transportation as appropriate  - Identify discharge learning needs (meds, wound care, etc)  - Arrange for interpreters to assist at discharge as needed  - Consider post-discharge preferences of patient/family/discharge partner  - Complete POLST form as appropriate  - Assess patient's ability to be responsible for managing their own health  - Refer to Case Management Department for coordinating discharge planning if the patient needs post-hospital services based on physician/LIP order or complex needs related to functional status, cognitive ability or social support system  Outcome: Progressing     Problem: CARDIOVASCULAR - ADULT  Goal: Maintains optimal cardiac output and hemodynamic stability  Description: INTERVENTIONS:  - Monitor vital signs, rhythm, and trends  - Monitor for bleeding, hypotension and signs of decreased cardiac  output  - Evaluate effectiveness of vasoactive medications to optimize hemodynamic stability  - Monitor arterial and/or venous puncture sites for bleeding and/or hematoma  - Assess quality of pulses, skin color and temperature  - Assess for signs of decreased coronary artery perfusion - ex. Angina  - Evaluate fluid balance, assess for edema, trend weights  Outcome: Progressing  Goal: Absence of cardiac arrhythmias or at baseline  Description: INTERVENTIONS:  - Continuous cardiac monitoring, monitor vital signs, obtain 12 lead EKG if indicated  - Evaluate effectiveness of antiarrhythmic and heart rate control medications as ordered  - Initiate emergency measures for life threatening arrhythmias  - Monitor electrolytes and administer replacement therapy as ordered  Outcome: Progressing     Problem: RESPIRATORY - ADULT  Goal: Achieves optimal ventilation and oxygenation  Description: INTERVENTIONS:  - Assess for changes in respiratory status  - Assess for changes in mentation and behavior  - Position to facilitate oxygenation and minimize respiratory effort  - Oxygen supplementation based on oxygen saturation or ABGs  - Provide Smoking Cessation handout, if applicable  - Encourage broncho-pulmonary hygiene including cough, deep breathe, Incentive Spirometry  - Assess the need for suctioning and perform as needed  - Assess and instruct to report SOB or any respiratory difficulty  - Respiratory Therapy support as indicated  - Manage/alleviate anxiety  - Monitor for signs/symptoms of CO2 retention  Outcome: Progressing     Problem: GASTROINTESTINAL - ADULT  Goal: Maintains or returns to baseline bowel function  Description: INTERVENTIONS:  - Assess bowel function  - Maintain adequate hydration with IV or PO as ordered and tolerated  - Evaluate effectiveness of GI medications  - Encourage mobilization and activity  - Obtain nutritional consult as needed  - Establish a toileting routine/schedule  - Consider collaborating  with pharmacy to review patient's medication profile  Outcome: Progressing

## 2024-10-20 NOTE — PROGRESS NOTES
[facility]      [facility] Cardiology Progress Note    Ollie Hernández Patient Status:  Inpatient    1945 MRN [id number]   Location [facility] 2W/SW Attending Pranay Michel MD   Hosp Day # 22 PCP Wili Parmar MD       Impression/Plan:  79 year old male presenting with:    Impression:  Hip fracture Left - S/P L anterior total hip arthroplasty 10/4/24  Acute resp failure, aspiration and/or pulmonary edema; intubated 2024 extubated 10/6  ESRD on HD  DM  PAF, currently SR. S/p watchman device 24  HTN  HL, on statin PTA  Acute on chronic diastolic CHF  CAD s/p Cisco circ 24   Coffee grounds in OG tube  -Slight decline in Hb post-op  Elevated troponin, likely demand ischemia  VT-ns  1:24 AM 10/14/24      Plan:  - s/p G tube, ok to restart coreg via g tube. Will transition to oral amio tomorrow if tolerates.  - Cont statin   - Plavix restarted 10/8. Hb stable taking PO  monitor for bleeding (recent Watchman implant 2024) consider restarting ASA 81 if Hb con't to remain stable     - Monitor on tele  - Reviewed w/ wife and RN's  at bedside   - Not clear cause for sinus tachy possibly pain , hypotension  BB held rebound  , low volume. Hb has been stable.   - EF 55%  TERRI 24 restart BB  when able taking PO   - Dialysis as scheduled    - Med Rx  limited by NPO  and hypotension     Con't , Amiodarone Gtt @  .5 hr per Dr Phelan       Hold Diltiazem and  Dig.   - Plavix on hold for possible g-tube restart when able for recent stent          Subjective:     Feels the same.      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)    Atherosclerosis of native arteries of extremities with intermittent claudication, bilateral legs (HCC)    Primary hypertension    Smokers' cough (HCC)    Unstable angina (HCC)    Lower GI bleed    ESRD (end stage renal disease) on dialysis (HCC)    PAF (paroxysmal atrial fibrillation) (HCC)    AVM (arteriovenous malformation) of colon    ABLA (acute blood loss anemia)    Rectal bleeding    Anticoagulated    Antiplatelet or antithrombotic long-term use    Lower GI bleeding    ESRD on hemodialysis (HCC)    GI bleed    Closed displaced midcervical fracture of left femur with delayed healing    ESRD (end stage renal disease) (HCC)    Closed fracture of left hip (HCC)    Hyperphosphatemia    Respiratory failure (HCC)    Anemia    Palliative care by specialist       Objective:   Temp: 98.8 °F (37.1 °C)  Pulse: 72  Resp: 20  BP: 139/35    Intake/Output:     Intake/Output Summary (Last 24 hours) at 10/20/2024 1033  Last data filed at 10/20/2024 0649  Gross per 24 hour   Intake 1160.03 ml   Output --   Net 1160.03 ml         Last 3 Weights   10/20/24 0649 134 lb 3.2 oz (60.9 kg)   10/18/24 0441 131 lb 1.6 oz (59.5 kg)   10/05/24 0600 144 lb 10 oz (65.6 kg)   10/04/24 0600 145 lb 8.1 oz (66 kg)   10/02/24 1310 156 lb 4.9 oz (70.9 kg)   10/02/24 0800 143 lb 11.8 oz (65.2 kg)   10/01/24 0600 160 lb 0.9 oz (72.6 kg)   09/30/24 0400 164 lb 0.4 oz (74.4 kg)   09/28/24 1649 155 lb 11.2 oz (70.6 kg)   08/22/24 1311 159 lb 12.8 oz (72.5 kg)   08/20/24 0933 158 lb (71.7 kg)       Tele:    A Flutter > NSR     Physical Exam:    General: Awake   HEENT: Normocephalic, anicteric sclera NG tube out   Neck: supple  Cardiac: Regular rhythm. S1, S2 normal. No murmur, pericardial rub, S3, thrill, heave or extra cardiac sounds.  Lungs: Coarse breath sounds bilat  Abdomen: Soft,  non-distended  Extremities: Without clubbing, cyanosis or edema.  SCD boots Missing digits R hand   Neurologic: Alert+confused   Skin: Warm and dry.     Laboratory/Data:    Labs:         Recent Labs   Lab 10/16/24  0421 10/17/24  0412 10/18/24  0633 10/19/24  0723 10/20/24  0448   WBC 15.6* 14.6* 17.0* 12.8* 13.3*   HGB 9.4* 9.8* 9.5* 8.0* 8.5*   MCV 94.9 100.6* 104.7* 96.9 95.1   .0 385.0 255.0 235.0 243.0       Recent Labs   Lab 10/16/24  0421 10/17/24  0412 10/18/24  0849 10/19/24  0723 10/20/24  0448    138 133* 134* 133*   K 4.7 4.6 4.2 3.6 3.6    100 100 98 96*   CO2 25.0 26.0 19.0* 27.0 26.0   BUN 97* 57* 72* 49* 59*   CREATSERUM 8.70* 6.37* 7.66* 5.18* 6.45*   CA 10.1 9.9 9.4 9.2 9.1   MG 2.4 2.2 2.1 1.8 1.8   PHOS 7.6* 7.4* 6.4*  --  5.3*   * 177* 203* 133* 202*       Recent Labs   Lab 10/17/24  0412   ALT 19   AST 39*   ALB 3.8       No results for input(s): \"TROP\" in the last 168 hours.    Allergies:   Allergies   Allergen Reactions    Adhesive Tape OTHER (SEE COMMENTS)     Severe rashes    Dust Mite Extract RASH       Medications:  Current Facility-Administered Medications   Medication Dose Route Frequency    dextrose 10% infusion (TPN no rate)   Intravenous Continuous PRN    pancrelipase (Lip-Prot-Amyl) (Zenpep) DR particles cap 10,000 Units  10,000 Units Per G Tube PRN    And    sodium bicarbonate tab 325 mg  325 mg Per G Tube PRN    atorvastatin (Lipitor) tab 40 mg  40 mg Per G Tube Nightly    clopidogrel (Plavix) tab 75 mg  75 mg Per G Tube Daily    lansoprazole (Prevacid Solutab) disintegrating tab 30 mg  30 mg Per G Tube QAM AC    HYDROcodone-acetaminophen (Norco) 5-325 MG per tab 1 tablet  1 tablet Per G Tube Q6H PRN    acetaminophen (Tylenol) 160 MG/5ML oral liquid 650 mg  650 mg Per G Tube Q4H PRN    carvedilol (Coreg) tab 25 mg  25 mg Per G Tube BID    insulin regular human (Novolin R, Humulin R) 100 UNIT/ML injection 1-7 Units  1-7 Units Subcutaneous 4 times per day     [Held by provider] dilTIAZem (cardIZEM) tab 30 mg  30 mg Oral 4 times per day    amiodarone (Cordarone) 450 mg in dextrose 5% 250 mL infusion  0.5 mg/min Intravenous Continuous    heparin (Porcine) 1000 UNIT/ML injection 1,500 Units  1.5 mL Intravenous PRN Dialysis    lidocaine-prilocaine (Emla) 2.5-2.5 % cream   Topical PRN    dextrose 10% infusion (TPN no rate)   Intravenous Continuous PRN    ipratropium-albuterol (Duoneb) 0.5-2.5 (3) MG/3ML inhalation solution 3 mL  3 mL Nebulization 2 times daily    ipratropium-albuterol (Duoneb) 0.5-2.5 (3) MG/3ML inhalation solution 3 mL  3 mL Nebulization Q6H PRN    influenza virus trivalent high dose PF (Fluzone HD) 0.5 mL injection (ages >/= 65 years) 0.5 mL  0.5 mL Intramuscular Prior to discharge    sevelamer carbonate (Renvela) tab 800 mg  800 mg Oral TID CC    [Held by provider] insulin aspart (NovoLOG) 100 Units/mL FlexPen 1-7 Units  1-7 Units Subcutaneous TID CC and HS    HYDROmorphone (Dilaudid) 1 MG/ML injection 0.1 mg  0.1 mg Intravenous Q2H PRN    Or    HYDROmorphone (Dilaudid) 1 MG/ML injection 0.2 mg  0.2 mg Intravenous Q2H PRN    Or    HYDROmorphone (Dilaudid) 1 MG/ML injection 0.4 mg  0.4 mg Intravenous Q2H PRN    epoetin donna (Epogen, Procrit) 5,000 Units injection  5,000 Units Subcutaneous Once per day on Monday Wednesday Friday    metoclopramide (Reglan) 5 mg/mL injection 10 mg  10 mg Intravenous Q24H PRN    ondansetron (Zofran) 4 MG/2ML injection 4 mg  4 mg Intravenous Q6H PRN    diphenhydrAMINE (Benadryl) cap/tab 25 mg  25 mg Oral Q4H PRN    Or    diphenhydrAMINE (Benadryl) 50 mg/mL  injection 12.5 mg  12.5 mg Intravenous Q4H PRN    heparin (Porcine) 5000 UNIT/ML injection 5,000 Units  5,000 Units Subcutaneous Q8H [facility]    mineral oil-white petrolatum (Artificial Tears) 83-15 % ophthalmic ointment 1 Application  1 Application Both Eyes Nightly    lidocaine-menthol 4-1 % patch 2 patch  2 patch Transdermal Daily    hydrALAzine (Apresoline) 20 mg/mL  injection 10 mg  10 mg Intravenous Q6H PRN    labetalol (Trandate) 5 mg/mL injection 10 mg  10 mg Intravenous Q4H PRN    polyethylene glycol (PEG 3350) (Miralax) 17 g oral packet 17 g  17 g Per NG Tube Daily PRN    bisacodyl (Dulcolax) 10 MG rectal suppository 10 mg  10 mg Rectal Daily PRN    albuterol (Ventolin HFA) 108 (90 Base) MCG/ACT inhaler 2 puff  2 puff Inhalation Q4H PRN    fluticasone propionate (Flonase) 50 MCG/ACT nasal suspension 2 spray  2 spray Nasal Daily PRN    glucose (Dex4) 15 GM/59ML oral liquid 15 g  15 g Oral Q15 Min PRN    Or    glucose (Glutose) 40% oral gel 15 g  15 g Oral Q15 Min PRN    Or    glucose-vitamin C (Dex-4) chewable tab 4 tablet  4 tablet Oral Q15 Min PRN    Or    dextrose 50% injection 50 mL  50 mL Intravenous Q15 Min PRN    Or    glucose (Dex4) 15 GM/59ML oral liquid 30 g  30 g Oral Q15 Min PRN    Or    glucose (Glutose) 40% oral gel 30 g  30 g Oral Q15 Min PRN    Or    glucose-vitamin C (Dex-4) chewable tab 8 tablet  8 tablet Oral Q15 Min PRN    fluticasone-salmeterol (Advair Diskus) 250-50 MCG/ACT inhaler 1 puff  1 puff Inhalation BID

## 2024-10-20 NOTE — PLAN OF CARE
Plan for dialysis tomorrow. G-tube feed now at goal gtt@50. IV amio continuing plan to switch to oral tomorrow per cards. Pt c/o of pain PRN meds given. Call light within reach and fall precautions in place.     Problem: Patient Centered Care  Goal: Patient preferences are identified and integrated in the patient's plan of care  Description: Interventions:  - What would you like us to know as we care for you? From home with wife  - Provide timely, complete, and accurate information to patient/family  - Incorporate patient and family knowledge, values, beliefs, and cultural backgrounds into the planning and delivery of care  - Encourage patient/family to participate in care and decision-making at the level they choose  - Honor patient and family perspectives and choices  Outcome: Progressing     Problem: Diabetes/Glucose Control  Goal: Glucose maintained within prescribed range  Description: INTERVENTIONS:  - Monitor Blood Glucose as ordered  - Assess for signs and symptoms of hyperglycemia and hypoglycemia  - Administer ordered medications to maintain glucose within target range  - Assess barriers to adequate nutritional intake and initiate nutrition consult as needed  - Instruct patient on self management of diabetes  Outcome: Progressing     Problem: PAIN - ADULT  Goal: Verbalizes/displays adequate comfort level or patient's stated pain goal  Description: INTERVENTIONS:  - Encourage pt to monitor pain and request assistance  - Assess pain using appropriate pain scale  - Administer analgesics based on type and severity of pain and evaluate response  - Implement non-pharmacological measures as appropriate and evaluate response  - Consider cultural and social influences on pain and pain management  - Manage/alleviate anxiety  - Utilize distraction and/or relaxation techniques  - Monitor for opioid side effects  - Notify MD/LIP if interventions unsuccessful or patient reports new pain  - Anticipate increased pain with  activity and pre-medicate as appropriate  Outcome: Progressing     Problem: SAFETY ADULT - FALL  Goal: Free from fall injury  Description: INTERVENTIONS:  - Assess pt frequently for physical needs  - Identify cognitive and physical deficits and behaviors that affect risk of falls.  - [place] fall precautions as indicated by assessment.  - Educate pt/family on patient safety including physical limitations  - Instruct pt to call for assistance with activity based on assessment  - Modify environment to reduce risk of injury  - Provide assistive devices as appropriate  - Consider OT/PT consult to assist with strengthening/mobility  - Encourage toileting schedule  Outcome: Progressing     Problem: DISCHARGE PLANNING  Goal: Discharge to home or other facility with appropriate resources  Description: INTERVENTIONS:  - Identify barriers to discharge w/pt and caregiver  - Include patient/family/discharge partner in discharge planning  - Arrange for needed discharge resources and transportation as appropriate  - Identify discharge learning needs (meds, wound care, etc)  - Arrange for interpreters to assist at discharge as needed  - Consider post-discharge preferences of patient/family/discharge partner  - Complete POLST form as appropriate  - Assess patient's ability to be responsible for managing their own health  - Refer to Case Management Department for coordinating discharge planning if the patient needs post-hospital services based on physician/LIP order or complex needs related to functional status, cognitive ability or social support system  Outcome: Progressing     Problem: CARDIOVASCULAR - ADULT  Goal: Maintains optimal cardiac output and hemodynamic stability  Description: INTERVENTIONS:  - Monitor vital signs, rhythm, and trends  - Monitor for bleeding, hypotension and signs of decreased cardiac output  - Evaluate effectiveness of vasoactive medications to optimize hemodynamic stability  - Monitor arterial and/or  venous puncture sites for bleeding and/or hematoma  - Assess quality of pulses, skin color and temperature  - Assess for signs of decreased coronary artery perfusion - ex. Angina  - Evaluate fluid balance, assess for edema, trend weights  Outcome: Progressing  Goal: Absence of cardiac arrhythmias or at baseline  Description: INTERVENTIONS:  - Continuous cardiac monitoring, monitor vital signs, obtain 12 lead EKG if indicated  - Evaluate effectiveness of antiarrhythmic and heart rate control medications as ordered  - Initiate emergency measures for life threatening arrhythmias  - Monitor electrolytes and administer replacement therapy as ordered  Outcome: Progressing     Problem: RESPIRATORY - ADULT  Goal: Achieves optimal ventilation and oxygenation  Description: INTERVENTIONS:  - Assess for changes in respiratory status  - Assess for changes in mentation and behavior  - Position to facilitate oxygenation and minimize respiratory effort  - Oxygen supplementation based on oxygen saturation or ABGs  - Provide Smoking Cessation handout, if applicable  - Encourage broncho-pulmonary hygiene including cough, deep breathe, Incentive Spirometry  - Assess the need for suctioning and perform as needed  - Assess and instruct to report SOB or any respiratory difficulty  - Respiratory Therapy support as indicated  - Manage/alleviate anxiety  - Monitor for signs/symptoms of CO2 retention  Outcome: Progressing     Problem: GASTROINTESTINAL - ADULT  Goal: Maintains or returns to baseline bowel function  Description: INTERVENTIONS:  - Assess bowel function  - Maintain adequate hydration with IV or PO as ordered and tolerated  - Evaluate effectiveness of GI medications  - Encourage mobilization and activity  - Obtain nutritional consult as needed  - Establish a toileting routine/schedule  - Consider collaborating with pharmacy to review patient's medication profile  Outcome: Progressing

## 2024-10-20 NOTE — PHYSICAL THERAPY NOTE
RN cleared pt to participate in PT follow up session this afternoon. Pt received sleeping in bed at start of session, arousable to voice. Pt politely declining to participate in further OOB activities today. Reports feeling too fatigued. Per RN, pt was up in chair earlier today for several hours. Will continue to follow.

## 2024-10-20 NOTE — PROGRESS NOTES
[facility]  part of [facility]    Progress Note    Ollie Hernández Patient Status:  Inpatient    1945 MRN [id number]   Location [facility] 4W/SW/SE Attending Pranay Michel MD   Hosp Day # 22 PCP Wili Parmar MD     Chief Complaint:   Chief Complaint   Patient presents with    Fall       Subjective:   Ollie Hernández is doing well. Up to chair. Dtr at bedside. No complaints. On goal TF 50ml/hour no BM   Objective:   Objective:    Blood pressure 139/35, pulse 72, temperature 98.8 °F (37.1 °C), temperature source Axillary, resp. rate 20, height 5' 4.02\" (1.626 m), weight 134 lb 3.2 oz (60.9 kg), SpO2 98%.    Physical Exam:    General: No acute distress. Appears frail, ill   Respiratory: Clear to auscultation bilaterally. No wheezes. No rhonchi.  Cardiovascular: RRR  Abdomen: Soft, nontender, nondistended.  Positive bowel sounds. No rebound or guarding.  Neurologic: No focal neurological deficits.   Musculoskeletal: Moves all extremities.  Extremities: No edema.      Results:   Results:    Labs:  Recent Labs   Lab 10/16/24  0421 10/17/24  0412 10/18/24  0633 10/19/24  0723 10/20/24  0448   WBC 15.6* 14.6* 17.0* 12.8* 13.3*   HGB 9.4* 9.8* 9.5* 8.0* 8.5*   MCV 94.9 100.6* 104.7* 96.9 95.1   .0 385.0 255.0 235.0 243.0       Recent Labs   Lab 10/17/24  0412 10/18/24  0849 10/19/24  0723 10/20/24  0448   * 203* 133* 202*   BUN 57* 72* 49* 59*   CREATSERUM 6.37* 7.66* 5.18* 6.45*   CA 9.9 9.4 9.2 9.1   ALB 3.8  --   --   --     133* 134* 133*   K 4.6 4.2 3.6 3.6    100 98 96*   CO2 26.0 19.0* 27.0 26.0   ALKPHO 103  --   --   --    AST 39*  --   --   --    ALT 19  --   --   --    BILT 0.4  --   --   --    TP 7.2  --   --   --        Estimated Creatinine Clearance: 8 mL/min (A) (based on SCr of 6.45 mg/dL (H)).    No results for input(s): \"PTP\", \"INR\" in the last 168 hours.         Culture:  Hospital Encounter on 24   1. Blood Culture     Status: None     Collection Time: 09/30/24 10:03 AM    Specimen: Bld,Arterial Line; Blood   Result Value Ref Range    Blood Culture Result No Growth 5 Days N/A       Cardiac  No results for input(s): \"TROP\", \"PBNP\" in the last 168 hours.      Imaging: Imaging data reviewed in [place].  XR CHEST AP PORTABLE  (CPT=71045)    Result Date: 10/19/2024  CONCLUSION:   Patchy retrocardiac and left lower lobe airspace opacity has slightly progressed since prior exam suspicious for atelectasis or pneumonia    Dictated by (CST): Jeff Novak MD on 10/19/2024 at 8:06 AM     Finalized by (CST): Jeff Novak MD on 10/19/2024 at 8:07 AM          XR HIP W OR WO PELVIS 2 OR 3 VIEWS, LEFT (CPT=73502)    Result Date: 10/18/2024  CONCLUSION:  1. Anatomically aligned left total hip arthroplasty.  No fracture other acute complication. 2. Degenerative changes in the lower lumbar spine and SI joints. 3. Atherosclerotic vascular calcification.    Dictated by (CST): Bennie Cuadra MD on 10/18/2024 at 3:36 PM     Finalized by (CST): Bennie Cuadra MD on 10/18/2024 at 3:42 PM           Medications:    atorvastatin  40 mg Per G Tube Nightly    clopidogrel  75 mg Per G Tube Daily    lansoprazole  30 mg Per G Tube QAM AC    carvedilol  25 mg Per G Tube BID    insulin regular human  1-7 Units Subcutaneous 4 times per day    [Held by provider] dilTIAZem  30 mg Oral 4 times per day    ipratropium-albuterol  3 mL Nebulization 2 times daily    sevelamer carbonate  800 mg Oral TID CC    [Held by provider] insulin aspart  1-7 Units Subcutaneous TID CC and HS    epoetin donna  5,000 Units Subcutaneous Once per day on Monday Wednesday Friday    heparin  5,000 Units Subcutaneous Q8H STEPHANIE    mineral oil-white petrolatum  1 Application Both Eyes Nightly    lidocaine-menthol  2 patch Transdermal Daily    fluticasone-salmeterol  1 puff Inhalation BID         Assessment and Plan:   Assessment & Plan:      Acute hypoxemic respiratory failure - resolved  Hypotension -  resolved  Aspiration PNA - better   Improved.   Pulmonary and cards on consult.   On RA now was briefly intubated on mechanical ventilation 9/29-10/6.   Completed course of zosyn 7 days.   Was in ICU has been transferred to floor.   SLP eval - Evidence of aspiration   Failed video swallow with evidence of aspiration.   S/p PEG tube placement 10/19. On goal TF rate on DC can do 4-5cans/day   Afib RVR   Cards on consult.   Amiodarone gtt per EP. PO amio tomorrow per cards.   Stop digoxin and cardizem.   Coreg via G tube   ESRD   Renal on cosnult.   HD M,W,F  Epogen, renvela   L hip fracture   S/p mechanical fall   Orthopedic surgery signed off   S/p L hip arthroplasty 10/4/24  Pain better controlled. Stop IV tyleol. Will stat PO pain meds  XR L hip as pt continues with pain   PT/OT - JESUS   DM II   Stop scheduled insulin as NPO  ISS   Acute on chronic HFpEF - better   CAD   Elevated troponin secondary to demand ischemia   Cards on consult.   Coreg, statin , ASA, plavix   No cardiac complaints.   Coffee ground emesis - resolved.   Prevacid   Hemodynamically stable.   Plavix on hold   H/H stable.   Deconditioned   PT/OT - JESUS        Dispo: to [facility] Tuesday.     >55min spent, >50% spent counseling and coordinating care in the form of educating pt/family and d/w consultants and staff. Most of the time spent discussing the above plan.        Plan of care discussed with patient or family at bedside.    Pranay Michel MD  Hospitalist          Supplementary Documentation:     Quality:  DVT Prophylaxis: SCD heparin on hold   CODE status: Full   Dispo: per clinical course            Estimated date of discharge: TBD  Discharge is dependent on: clinical stability  At this point Mr. Hernández is expected to be discharge to: [facility]

## 2024-10-20 NOTE — PROGRESS NOTES
GI  PROGRESS NOTE  Wife at bedside.   SUBJECTIVE: staff report no difficulty with G-tube.       OBJECTIVE:  Temp:  [98 °F (36.7 °C)-98.9 °F (37.2 °C)] 98.9 °F (37.2 °C)  Pulse:  [66-80] 73  Resp:  [18-20] 20  BP: (132-152)/(35-73) 132/50  SpO2:  [96 %-98 %] 96 %  Exam  Gen: No acute distress, alert and oriented x3  Pulm: Lungs clear, normal respiratory effort  CV: Heart with regular rate and rhythm, no peripheral edema  Abd: Abdomen soft, NT; ND; (+) BS; no organomegaly, bowel sounds present  G-tube site without infection / leak.     Labs:     Recent Labs   Lab 10/16/24  0421 10/17/24  0412 10/18/24  0633 10/19/24  0723 10/20/24  0448   WBC 15.6* 14.6* 17.0* 12.8* 13.3*   HGB 9.4* 9.8* 9.5* 8.0* 8.5*   MCV 94.9 100.6* 104.7* 96.9 95.1   .0 385.0 255.0 235.0 243.0       Recent Labs   Lab 10/16/24  0421 10/17/24  0412 10/18/24  0849 10/19/24  0723 10/20/24  0448    138 133* 134* 133*   K 4.7 4.6 4.2 3.6 3.6    100 100 98 96*   CO2 25.0 26.0 19.0* 27.0 26.0   BUN 97* 57* 72* 49* 59*   CREATSERUM 8.70* 6.37* 7.66* 5.18* 6.45*   CA 10.1 9.9 9.4 9.2 9.1   MG 2.4 2.2 2.1 1.8 1.8   PHOS 7.6* 7.4* 6.4*  --  5.3*   * 177* 203* 133* 202*       Recent Labs   Lab 10/17/24  0412   ALT 19   AST 39*   ALB 3.8         Meds:      sodium ferric gluconate  125 mg Intravenous Daily    atorvastatin  40 mg Per G Tube Nightly    clopidogrel  75 mg Per G Tube Daily    lansoprazole  30 mg Per G Tube QAM AC    carvedilol  25 mg Per G Tube BID    insulin regular human  1-7 Units Subcutaneous 4 times per day    ipratropium-albuterol  3 mL Nebulization 2 times daily    sevelamer carbonate  800 mg Oral TID CC    [Held by provider] insulin aspart  1-7 Units Subcutaneous TID CC and HS    epoetin donna  5,000 Units Subcutaneous Once per day on Monday Wednesday Friday    heparin  5,000 Units Subcutaneous Q8H [facility]    mineral oil-white petrolatum  1 Application Both Eyes Nightly    lidocaine-menthol  2 patch Transdermal Daily     fluticasone-salmeterol  1 puff Inhalation BID      dextrose 10%      amiodarone 0.5 mg/min (10/20/24 0649)    dextrose 10%         dextrose 10%    lipase-protease-amylase (Lip-Prot-Amyl) **AND** sodium bicarbonate    HYDROcodone-acetaminophen    acetaminophen **OR** [DISCONTINUED] acetaminophen **OR** [DISCONTINUED] acetaminophen **OR** [DISCONTINUED] acetaminophen    heparin    lidocaine-prilocaine    dextrose 10%    ipratropium-albuterol    influenza virus vaccine PF    HYDROmorphone **OR** HYDROmorphone **OR** HYDROmorphone    metoclopramide    ondansetron    diphenhydrAMINE **OR** diphenhydrAMINE    hydrALAzine    labetalol    polyethylene glycol (PEG 3350)    bisacodyl    albuterol    fluticasone propionate    glucose **OR** glucose **OR** glucose-vitamin C **OR** dextrose **OR** glucose **OR** glucose **OR** glucose-vitamin C    _______________________________________________________________    IMPRESSION: Pt is a 79 yr M admitted 9/28/2024 with femur fx after fall. GI following patient for oropharyngeal dysphagia.     ACTIVE ISSUES INCLUDE:     Oropharyngeal dysphagia -- stable s/p PEG tube placement. Local G-tube care.     2. ESRD / HD     3. PAF, s/p watchman device. HTN    4. Acute resp failure, aspiration and/or pulmonary edema; intubated 9/29/2024 extubated 10/6     Please call us as needed.   Yash Sheppard M.D.  [facility]   _______________________________________________________________

## 2024-10-20 NOTE — PROGRESS NOTES
[facility]  part of [facility]    Nephrology Progress Note    Chief Complaint   Patient presents with    Fall       Subjective:   79 year old male, following for ESRD on HD.     No new complaints today.   PEG tube placed yesterday.     Review of Systems:   Review of Systems   Constitutional:  Positive for fatigue. Negative for chills and fever.   HENT:  Positive for trouble swallowing.    Respiratory:  Negative for cough and shortness of breath.    Cardiovascular:  Negative for chest pain and leg swelling.   Gastrointestinal:  Negative for abdominal pain, constipation, diarrhea, nausea and vomiting.       Objective:   Temp:  [98 °F (36.7 °C)-98.9 °F (37.2 °C)] 98.9 °F (37.2 °C)  Pulse:  [66-80] 73  Resp:  [18-20] 20  BP: (132-152)/(35-73) 132/50  SpO2:  [96 %-98 %] 96 %  SpO2: 96 %     Intake/Output Summary (Last 24 hours) at 10/20/2024 1357  Last data filed at 10/20/2024 1313  Gross per 24 hour   Intake 1210.03 ml   Output --   Net 1210.03 ml     Wt Readings from Last 3 Encounters:   10/20/24 134 lb 3.2 oz (60.9 kg)   08/22/24 159 lb 12.8 oz (72.5 kg)   08/20/24 158 lb (71.7 kg)     Physical Exam  Constitutional:       Appearance: Normal appearance.   Cardiovascular:      Rate and Rhythm: Normal rate and regular rhythm.      Heart sounds: Normal heart sounds.      Comments: R AVF-pos bruit/thrill  Pulmonary:      Effort: Pulmonary effort is normal.      Breath sounds: Normal breath sounds.   Musculoskeletal:         General: Normal range of motion.   Skin:     General: Skin is warm and dry.   Neurological:      General: No focal deficit present.      Mental Status: He is alert and oriented to person, place, and time.   Psychiatric:         Mood and Affect: Mood normal.         Behavior: Behavior normal.         Medications:  Current Facility-Administered Medications   Medication Dose Route Frequency    sodium ferric gluconate (Ferrlecit) 125 mg in sodium chloride 0.9% 100mL IVPB premix  125 mg  Intravenous Daily    dextrose 10% infusion (TPN no rate)   Intravenous Continuous PRN    pancrelipase (Lip-Prot-Amyl) (Zenpep) DR particles cap 10,000 Units  10,000 Units Per G Tube PRN    And    sodium bicarbonate tab 325 mg  325 mg Per G Tube PRN    atorvastatin (Lipitor) tab 40 mg  40 mg Per G Tube Nightly    clopidogrel (Plavix) tab 75 mg  75 mg Per G Tube Daily    lansoprazole (Prevacid Solutab) disintegrating tab 30 mg  30 mg Per G Tube QAM AC    HYDROcodone-acetaminophen (Norco) 5-325 MG per tab 1 tablet  1 tablet Per G Tube Q6H PRN    acetaminophen (Tylenol) 160 MG/5ML oral liquid 650 mg  650 mg Per G Tube Q4H PRN    carvedilol (Coreg) tab 25 mg  25 mg Per G Tube BID    insulin regular human (Novolin R, Humulin R) 100 UNIT/ML injection 1-7 Units  1-7 Units Subcutaneous 4 times per day    amiodarone (Cordarone) 450 mg in dextrose 5% 250 mL infusion  0.5 mg/min Intravenous Continuous    heparin (Porcine) 1000 UNIT/ML injection 1,500 Units  1.5 mL Intravenous PRN Dialysis    lidocaine-prilocaine (Emla) 2.5-2.5 % cream   Topical PRN    dextrose 10% infusion (TPN no rate)   Intravenous Continuous PRN    ipratropium-albuterol (Duoneb) 0.5-2.5 (3) MG/3ML inhalation solution 3 mL  3 mL Nebulization 2 times daily    ipratropium-albuterol (Duoneb) 0.5-2.5 (3) MG/3ML inhalation solution 3 mL  3 mL Nebulization Q6H PRN    influenza virus trivalent high dose PF (Fluzone HD) 0.5 mL injection (ages >/= 65 years) 0.5 mL  0.5 mL Intramuscular Prior to discharge    sevelamer carbonate (Renvela) tab 800 mg  800 mg Oral TID CC    [Held by provider] insulin aspart (NovoLOG) 100 Units/mL FlexPen 1-7 Units  1-7 Units Subcutaneous TID CC and HS    HYDROmorphone (Dilaudid) 1 MG/ML injection 0.1 mg  0.1 mg Intravenous Q2H PRN    Or    HYDROmorphone (Dilaudid) 1 MG/ML injection 0.2 mg  0.2 mg Intravenous Q2H PRN    Or    HYDROmorphone (Dilaudid) 1 MG/ML injection 0.4 mg  0.4 mg Intravenous Q2H PRN    epoetin donna (Epogen, Procrit)  5,000 Units injection  5,000 Units Subcutaneous Once per day on Monday Wednesday Friday    metoclopramide (Reglan) 5 mg/mL injection 10 mg  10 mg Intravenous Q24H PRN    ondansetron (Zofran) 4 MG/2ML injection 4 mg  4 mg Intravenous Q6H PRN    diphenhydrAMINE (Benadryl) cap/tab 25 mg  25 mg Oral Q4H PRN    Or    diphenhydrAMINE (Benadryl) 50 mg/mL  injection 12.5 mg  12.5 mg Intravenous Q4H PRN    heparin (Porcine) 5000 UNIT/ML injection 5,000 Units  5,000 Units Subcutaneous Q8H STEPHANIE    mineral oil-white petrolatum (Artificial Tears) 83-15 % ophthalmic ointment 1 Application  1 Application Both Eyes Nightly    lidocaine-menthol 4-1 % patch 2 patch  2 patch Transdermal Daily    hydrALAzine (Apresoline) 20 mg/mL injection 10 mg  10 mg Intravenous Q6H PRN    labetalol (Trandate) 5 mg/mL injection 10 mg  10 mg Intravenous Q4H PRN    polyethylene glycol (PEG 3350) (Miralax) 17 g oral packet 17 g  17 g Per NG Tube Daily PRN    bisacodyl (Dulcolax) 10 MG rectal suppository 10 mg  10 mg Rectal Daily PRN    albuterol (Ventolin HFA) 108 (90 Base) MCG/ACT inhaler 2 puff  2 puff Inhalation Q4H PRN    fluticasone propionate (Flonase) 50 MCG/ACT nasal suspension 2 spray  2 spray Nasal Daily PRN    glucose (Dex4) 15 GM/59ML oral liquid 15 g  15 g Oral Q15 Min PRN    Or    glucose (Glutose) 40% oral gel 15 g  15 g Oral Q15 Min PRN    Or    glucose-vitamin C (Dex-4) chewable tab 4 tablet  4 tablet Oral Q15 Min PRN    Or    dextrose 50% injection 50 mL  50 mL Intravenous Q15 Min PRN    Or    glucose (Dex4) 15 GM/59ML oral liquid 30 g  30 g Oral Q15 Min PRN    Or    glucose (Glutose) 40% oral gel 30 g  30 g Oral Q15 Min PRN    Or    glucose-vitamin C (Dex-4) chewable tab 8 tablet  8 tablet Oral Q15 Min PRN    fluticasone-salmeterol (Advair Diskus) 250-50 MCG/ACT inhaler 1 puff  1 puff Inhalation BID              Results:     Recent Labs   Lab 10/18/24  0633 10/19/24  0723 10/20/24  0448   RBC 2.98* 2.57* 2.67*   HGB 9.5* 8.0* 8.5*    HCT 31.2* 24.9* 25.4*   .7* 96.9 95.1   NEPRELIM  --  10.35*  --    WBC 17.0* 12.8* 13.3*   .0 235.0 243.0     Recent Labs   Lab 10/17/24  0412 10/18/24  0849 10/19/24  0723 10/20/24  0448   * 203* 133* 202*   BUN 57* 72* 49* 59*   CREATSERUM 6.37* 7.66* 5.18* 6.45*   CA 9.9 9.4 9.2 9.1   ALB 3.8  --   --   --     133* 134* 133*   K 4.6 4.2 3.6 3.6    100 98 96*   CO2 26.0 19.0* 27.0 26.0   ALKPHO 103  --   --   --    AST 39*  --   --   --    ALT 19  --   --   --    BILT 0.4  --   --   --    TP 7.2  --   --   --      PTT   Date Value Ref Range Status   08/09/2024 30.1 23.0 - 36.0 seconds Final     INR   Date Value Ref Range Status   08/09/2024 1.45 (H) 0.80 - 1.20 Final     Comment:     Therapeutic INR range for patients on warfarin:  2.0-3.0 for most medical conditions and surgical prophylaxis   2.5-3.5 for mechanical heart valves and recurrent thromboembolism    Direct thrombin inhibitors (e.g. argatroban, bivalirudin), factor Xa inhibitors (e.g. apixaban, rivaroxaban), and conditions such as coagulation factor deficiency, vitamin K deficiency, and liver disease will prolong the prothrombin time/INR.    Unfractionated heparin and low molecular weight heparin do not affect the prothrombin time/INR up to a concentration of 1 IU/mL and 1.5 IU/mL, respectively.         No results for input(s): \"BNP\" in the last 168 hours.  Recent Labs   Lab 10/17/24  0412 10/18/24  0849 10/19/24  0723 10/20/24  0448   MG 2.2 2.1 1.8 1.8   PHOS 7.4* 6.4*  --  5.3*        Recent Labs   Lab 10/17/24  0412 10/18/24  0849 10/20/24  0448   PHOS 7.4* 6.4* 5.3*   ALB 3.8  --   --        XR CHEST AP PORTABLE  (CPT=71045)    Result Date: 10/19/2024  CONCLUSION:   Patchy retrocardiac and left lower lobe airspace opacity has slightly progressed since prior exam suspicious for atelectasis or pneumonia    Dictated by (CST): Jeff Novak MD on 10/19/2024 at 8:06 AM     Finalized by (CST): Jeff Novak MD on  10/19/2024 at 8:07 AM          XR HIP W OR WO PELVIS 2 OR 3 VIEWS, LEFT (CPT=73502)    Result Date: 10/18/2024  CONCLUSION:  1. Anatomically aligned left total hip arthroplasty.  No fracture other acute complication. 2. Degenerative changes in the lower lumbar spine and SI joints. 3. Atherosclerotic vascular calcification.    Dictated by (CST): Bennie Cuadra MD on 10/18/2024 at 3:36 PM     Finalized by (CST): Bennie Cuadra MD on 10/18/2024 at 3:42 PM               Assessment and Plan:     79 year old male with past medical history of T2DM, HTN, HLD, ESRD on HD MWF via R AVF, CAD s/p PCI (5/2024), A.fib on Eliquis, HFpEF, KRAIG, BPH, history of recurrent gastrointestinal bleeding, who is admitted with left femoral neck fracture.      ESRD HD MWF:   Next HD Monday.     Aspiration PNA:   Completed a course of zosyn.   S/p PEG placement 10/19.  GI following    Left hip fracture:   S/p L hip arthroplasty 10/4/24   PT/OT    CAD/A.fib:   Cards following.    Anemia:   Continue EPO 5000 units 3 times per week.      Mirta Redman MD  10/20/2024

## 2024-10-21 LAB
ANION GAP SERPL CALC-SCNC: 13 MMOL/L (ref 0–18)
BUN BLD-MCNC: 72 MG/DL (ref 9–23)
BUN/CREAT SERPL: 9.5 (ref 10–20)
CALCIUM BLD-MCNC: 9.2 MG/DL (ref 8.7–10.4)
CHLORIDE SERPL-SCNC: 93 MMOL/L (ref 98–112)
CO2 SERPL-SCNC: 27 MMOL/L (ref 21–32)
CREAT BLD-MCNC: 7.56 MG/DL
DEPRECATED RDW RBC AUTO: 58.2 FL (ref 35.1–46.3)
EGFRCR SERPLBLD CKD-EPI 2021: 7 ML/MIN/1.73M2 (ref 60–?)
ERYTHROCYTE [DISTWIDTH] IN BLOOD BY AUTOMATED COUNT: 16.3 % (ref 11–15)
GLUCOSE BLD-MCNC: 206 MG/DL (ref 70–99)
GLUCOSE BLDC GLUCOMTR-MCNC: 144 MG/DL (ref 70–99)
GLUCOSE BLDC GLUCOMTR-MCNC: 211 MG/DL (ref 70–99)
GLUCOSE BLDC GLUCOMTR-MCNC: 269 MG/DL (ref 70–99)
GLUCOSE BLDC GLUCOMTR-MCNC: 271 MG/DL (ref 70–99)
GLUCOSE BLDC GLUCOMTR-MCNC: 86 MG/DL (ref 70–99)
HCT VFR BLD AUTO: 24.3 %
HGB BLD-MCNC: 7.9 G/DL
MCH RBC QN AUTO: 31.7 PG (ref 26–34)
MCHC RBC AUTO-ENTMCNC: 32.5 G/DL (ref 31–37)
MCV RBC AUTO: 97.6 FL
OSMOLALITY SERPL CALC.SUM OF ELEC: 303 MOSM/KG (ref 275–295)
PLATELET # BLD AUTO: 220 10(3)UL (ref 150–450)
POTASSIUM SERPL-SCNC: 3.8 MMOL/L (ref 3.5–5.1)
RBC # BLD AUTO: 2.49 X10(6)UL
SODIUM SERPL-SCNC: 133 MMOL/L (ref 136–145)
WBC # BLD AUTO: 14.9 X10(3) UL (ref 4–11)

## 2024-10-21 PROCEDURE — 99233 SBSQ HOSP IP/OBS HIGH 50: CPT | Performed by: INTERNAL MEDICINE

## 2024-10-21 PROCEDURE — 99233 SBSQ HOSP IP/OBS HIGH 50: CPT | Performed by: HOSPITALIST

## 2024-10-21 RX ORDER — SENNOSIDES 8.6 MG
8.6 TABLET ORAL 2 TIMES DAILY
Status: DISCONTINUED | OUTPATIENT
Start: 2024-10-21 | End: 2024-10-22

## 2024-10-21 RX ORDER — AMIODARONE HYDROCHLORIDE 200 MG/1
200 TABLET ORAL 2 TIMES DAILY WITH MEALS
Status: DISCONTINUED | OUTPATIENT
Start: 2024-10-21 | End: 2024-10-22

## 2024-10-21 RX ORDER — DOCUSATE SODIUM 100 MG/1
100 CAPSULE, LIQUID FILLED ORAL 2 TIMES DAILY
Status: DISCONTINUED | OUTPATIENT
Start: 2024-10-21 | End: 2024-10-21

## 2024-10-21 RX ORDER — ALBUMIN (HUMAN) 12.5 G/50ML
25 SOLUTION INTRAVENOUS
Status: DISCONTINUED | OUTPATIENT
Start: 2024-10-23 | End: 2024-10-22

## 2024-10-21 RX ORDER — HEPARIN SODIUM 1000 [USP'U]/ML
1.5 INJECTION, SOLUTION INTRAVENOUS; SUBCUTANEOUS ONCE
Status: DISCONTINUED | OUTPATIENT
Start: 2024-10-23 | End: 2024-10-22

## 2024-10-21 RX ORDER — SENNOSIDES 8.6 MG
8.6 TABLET ORAL 2 TIMES DAILY
Status: DISCONTINUED | OUTPATIENT
Start: 2024-10-21 | End: 2024-10-21

## 2024-10-21 NOTE — PROGRESS NOTES
[facility]  part of [facility]    Progress Note    Ollie Hernández Patient Status:  Inpatient    1945 MRN [id number]   Location [facility] 4W/SW/SE Attending Pranay Michel MD   Hosp Day # 23 PCP Wili Parmar MD     Chief Complaint:   Chief Complaint   Patient presents with    Fall       Subjective:   Ollie Hernández is doing fair. C/o dry mouth no F/C no Cp no SOB getting HD now   Objective:   Objective:    Blood pressure 118/65, pulse 82, temperature 98.7 °F (37.1 °C), temperature source Oral, resp. rate 20, height 5' 4.02\" (1.626 m), weight 137 lb 4.8 oz (62.3 kg), SpO2 95%.    Physical Exam:    General: No acute distress. Appears frail, ill   Respiratory: Clear to auscultation bilaterally. No wheezes. No rhonchi.  Cardiovascular: RRR  Abdomen: Soft, nontender, nondistended.  Positive bowel sounds. No rebound or guarding.  Neurologic: No focal neurological deficits.   Musculoskeletal: Moves all extremities.  Extremities: No edema.      Results:   Results:    Labs:  Recent Labs   Lab 10/17/24  0412 10/18/24  0633 10/19/24  0723 10/20/24  0448 10/21/24  0656   WBC 14.6* 17.0* 12.8* 13.3* 14.9*   HGB 9.8* 9.5* 8.0* 8.5* 7.9*   .6* 104.7* 96.9 95.1 97.6   .0 255.0 235.0 243.0 220.0       Recent Labs   Lab 10/17/24  0412 10/18/24  0849 10/19/24  0723 10/20/24  0448 10/21/24  0656   *   < > 133* 202* 206*   BUN 57*   < > 49* 59* 72*   CREATSERUM 6.37*   < > 5.18* 6.45* 7.56*   CA 9.9   < > 9.2 9.1 9.2   ALB 3.8  --   --   --   --       < > 134* 133* 133*   K 4.6   < > 3.6 3.6 3.8      < > 98 96* 93*   CO2 26.0   < > 27.0 26.0 27.0   ALKPHO 103  --   --   --   --    AST 39*  --   --   --   --    ALT 19  --   --   --   --    BILT 0.4  --   --   --   --    TP 7.2  --   --   --   --     < > = values in this interval not displayed.       Estimated Creatinine Clearance: 6.9 mL/min (A) (based on SCr of 7.56 mg/dL (H)).    No results for input(s):  \"PTP\", \"INR\" in the last 168 hours.         Culture:  Hospital Encounter on 09/28/24   1. Blood Culture     Status: None    Collection Time: 09/30/24 10:03 AM    Specimen: Bld,Arterial Line; Blood   Result Value Ref Range    Blood Culture Result No Growth 5 Days N/A       Cardiac  No results for input(s): \"TROP\", \"PBNP\" in the last 168 hours.      Imaging: Imaging data reviewed in Epic.  No results found.    Medications:    [START ON 10/23/2024] heparin  1.5 mL Intracatheter Once    amiodarone  200 mg Per G Tube BID with meals    sennosides  8.6 mg Per G Tube BID    sodium ferric gluconate  125 mg Intravenous Daily    atorvastatin  40 mg Per G Tube Nightly    clopidogrel  75 mg Per G Tube Daily    lansoprazole  30 mg Per G Tube QAM AC    carvedilol  25 mg Per G Tube BID    insulin regular human  1-7 Units Subcutaneous 4 times per day    ipratropium-albuterol  3 mL Nebulization 2 times daily    sevelamer carbonate  800 mg Oral TID CC    [Held by provider] insulin aspart  1-7 Units Subcutaneous TID CC and HS    epoetin donna  5,000 Units Subcutaneous Once per day on Monday Wednesday Friday    heparin  5,000 Units Subcutaneous Q8H STEPHANIE    mineral oil-white petrolatum  1 Application Both Eyes Nightly    lidocaine-menthol  2 patch Transdermal Daily    fluticasone-salmeterol  1 puff Inhalation BID         Assessment and Plan:   Assessment & Plan:      Acute hypoxemic respiratory failure - resolved  Hypotension - resolved  Aspiration PNA - better   Improved.   Pulmonary and cards on consult.   On RA now was briefly intubated on mechanical ventilation 9/29-10/6.   Completed course of zosyn 7 days.   Was in ICU has been transferred to floor.   SLP eval - Evidence of aspiration   Failed video swallow with evidence of aspiration.   S/p PEG tube placement 10/19. On goal TF rate on DC can do 4-5cans/day   Afib RVR   Cards on consult.   Amiodarone gtt per EP. PO amio tomorrow per cards.   Stop digoxin and cardizem.   Coreg via G  tube   ESRD   Renal on cosnult.   HD M,W,F  Epogen, renvela   L hip fracture   S/p mechanical fall   Orthopedic surgery signed off   S/p L hip arthroplasty 10/4/24  Pain better controlled. Stop IV tyleol. Will stat PO pain meds  XR L hip as pt continues with pain   PT/OT - JESUS   DM II   Stop scheduled insulin as NPO  ISS   Acute on chronic HFpEF - better   CAD   Elevated troponin secondary to demand ischemia   Cards on consult.   Coreg, statin , ASA, plavix   No cardiac complaints.   Coffee ground emesis - resolved.   Prevacid   Hemodynamically stable.   Plavix on hold   H/H stable.   Deconditioned   PT/OT - JESUS        Dispo: to [facility] Tuesday.     >55min spent, >50% spent counseling and coordinating care in the form of educating pt/family and d/w consultants and staff. Most of the time spent discussing the above plan.        Plan of care discussed with patient or family at bedside.    Pranay Michel MD  Hospitalist          Supplementary Documentation:     Quality:  DVT Prophylaxis: SCD heparin on hold   CODE status: Full   Dispo: per clinical course            Estimated date of discharge: TBD  Discharge is dependent on: clinical stability  At this point Mr. Hernández is expected to be discharge to: [facility]

## 2024-10-21 NOTE — CM/SW NOTE
Department  notified care team of [facility] approval, see  below.    Assigned SW/CM to follow up with patient/family on discharge plan.       10/21 -- [facility] ID [id number], approved 10/21 to 10/23.    Maria Isabel John, DSC

## 2024-10-21 NOTE — DIETARY NOTE
Dietitian Note     Pt tolerating TF without issue, still waiting on BM per RN, receiving Miralax. Rec changing from continuous feeding to bolus feeding prior to DC. See rx below. Discussed with RN.     NUTRITION PRESCRIPTION:   Estimated Nutrition needs: --dosing wt of 59.5  kg - wt taken on 10/18  Calories: 1140-9218 calories/day (30-35 calories per kg Dosing wt) higher needs d/t HD and wt loss.   Protein: 71-89 g protein/day (1.2-1.5  g protein/kg Dosing wt)   Fluid Needs: Minimal to keep adequate hydration. Adjust per clinical status (ESRD, anuric)       EN via PEG: Bolus Nepro 4.5 cartons daily (1 @ 0800, 1.5 @ 1200, 1 @1600, 1 @ 2000). Give FWF 50 ml before and after each bolus feeding. This provides 1890 kcal, 85.5 g protein, and 1174 ml total water. Meets 100% of RDI's, estimated kcal, and protein needs.       RD to monitor for tolerance and f/u prn.       Nadia Lowe RD, LDN  Clinical Dietitian  P: [phone]

## 2024-10-21 NOTE — PROGRESS NOTES
[facility]  part of [facility]    Progress Note    Ollie Hernández Patient Status:  Inpatient    1945 MRN [id number]   Location [facility] 3W/SW Attending Pranay Michel MD   Hosp Day # 23 PCP Wili Parmar MD       Subjective:   Ollie Hernández is a(n) 79 year old male who I am seeing for ESRD      Resting in his chair    Objective:   /41 (BP Location: Left arm)   Pulse 75   Temp 98.7 °F (37.1 °C) (Oral)   Resp 20   Ht 5' 4.02\" (1.626 m)   Wt 137 lb 4.8 oz (62.3 kg)   SpO2 95%   BMI 23.56 kg/m²      Intake/Output Summary (Last 24 hours) at 10/21/2024 1004  Last data filed at 10/21/2024 0610  Gross per 24 hour   Intake 1436.37 ml   Output --   Net 1436.37 ml     Wt Readings from Last 1 Encounters:   10/21/24 137 lb 4.8 oz (62.3 kg)       Exam  Gen: No acute distress, Heent: NC AT, mucous memb clear, neck supple  Pulm: Lungs clear, normal respiratory effort  CV: Heart with regular rate and rhythm, no edema  Abd: Abdomen soft, nontender, nondistended, no organomegaly, bowel sounds present  Skin: no symptoms reported  Psych: alert and oriented    Assessment and Plan:     1 - ESRD  Plan dialysis on .  This will be continued as a life-sustaining modality for the patient    2 -aspiration pneumonia  Completed Zosyn.  Had a brief course of intubation  through .    He failed his video swallow and had evidence of aspiration so now he has a PEG tube.    3 -left hip fracture  Status post left hip arthroplasty on     4 -CAD/A-fib  On Plavix, Coreg, aspirin.    Status post watchman on     5 -anemia  On Epogen and IV iron    6 -secondary hyperparathyroidism  On Renvela        Results:     Recent Labs   Lab 10/19/24  0723 10/20/24  0448 10/21/24  0656   RBC 2.57* 2.67* 2.49*   HGB 8.0* 8.5* 7.9*   HCT 24.9* 25.4* 24.3*   MCV 96.9 95.1 97.6   MCH 31.1 31.8 31.7   MCHC 32.1 33.5 32.5   RDW 16.1* 16.6* 16.3*    NEPRELIM 10.35*  --   --    WBC 12.8* 13.3* 14.9*   .0 243.0 220.0         Recent Labs   Lab 10/19/24  0723 10/20/24  0448 10/21/24  0656   * 202* 206*   BUN 49* 59* 72*   CREATSERUM 5.18* 6.45* 7.56*   CA 9.2 9.1 9.2   * 133* 133*   K 3.6 3.6 3.8   CL 98 96* 93*   CO2 27.0 26.0 27.0          No results found.        SONDRA MART MD  10/21/2024

## 2024-10-21 NOTE — PHYSICAL THERAPY NOTE
PHYSICAL THERAPY TREATMENT NOTE - INPATIENT     Room Number: 306/306-A       Presenting Problem: fall, s/p L WENDY  Co-Morbidities : Anemia    Asthma ([facility])   Back problem   BPH (benign prostatic hyperplasia)   Calculus of kidney   Cataract   Coronary atherosclerosis   Diabetes ([facility])   ESRD (end stage renal disease) on dialysis ([facility])   Essential hypertension   High blood pressure   High cholesterol   History of blood transfusion   Hyperlipidemia   Neuropathy    hands and feet   KRAIG on CPAP   Renal disorder   Sleep apnea   Visual impairment    glasses   Vocal cord paralysis, unilateral partial    Problem List  Principal Problem:    Closed displaced midcervical fracture of left femur with delayed healing  Active Problems:    ESRD (end stage renal disease) ([facility])    Closed fracture of left hip ([facility])    Hyperphosphatemia    Respiratory failure ([facility])    Anemia    Palliative care by specialist      PHYSICAL THERAPY ASSESSMENT   Patient demonstrates good  progress this session, goals  remain in progress.      Patient is requiring minimal assist and moderate assist as a result of the following impairments: decreased functional strength, decreased endurance/aerobic capacity, pain, impaired standing balance, impaired motor planning, decreased muscular endurance, and medical status.     Patient continues to function below baseline with bed mobility, transfers, gait, stair negotiation, maintaining seated position, standing prolonged periods, and performing household tasks.  Next session anticipate patient to progress bed mobility, transfers, gait, maintaining seated position, and standing prolonged periods.  Physical Therapy will continue to follow patient for duration of hospitalization.    Patient continues to benefit from continued skilled PT services: to promote return to prior level of function and safety with continuous assistance and gradual rehabilitative therapy .    PLAN DURING HOSPITALIZATION  Nursing Mobility  Recommendation : 1 Assist     PT Treatment Plan: Bed mobility;Body mechanics;Coordination;Endurance;Patient education;Gait training;Strengthening;Transfer training;Balance training  Frequency (Obs): Daily     SUBJECTIVE  \"I'm ready to move. I need to move!\"    OBJECTIVE  Precautions: WENDY - anterior;Aspiration    WEIGHT BEARING RESTRICTION  L Lower Extremity: Weight Bearing as Tolerated      PAIN ASSESSMENT   Ratin  Location: LLE  Management Techniques: Activity promotion;Body mechanics;Repositioning    BALANCE  Static Sitting: Good  Dynamic Sitting: Fair +  Static Standing: Poor +  Dynamic Standing: Poor    ACTIVITY TOLERANCE  Pulse: 82  Heart Rate Source: Monitor     BP: 118/65 (post activity)  BP Location: Radial  BP Method: Automatic  Patient Position: Sitting     O2 WALK  Oxygen Therapy  SPO2% Ambulation on Room Air: 94    AM-PAC '6-Clicks' INPATIENT SHORT FORM - BASIC MOBILITY  How much difficulty does the patient currently have...  Patient Difficulty: Turning over in bed (including adjusting bedclothes, sheets and blankets)?: A Lot   Patient Difficulty: Sitting down on and standing up from a chair with arms (e.g., wheelchair, bedside commode, etc.): A Lot   Patient Difficulty: Moving from lying on back to sitting on the side of the bed?: A Lot   How much help from another person does the patient currently need...   Help from Another: Moving to and from a bed to a chair (including a wheelchair)?: A Little   Help from Another: Need to walk in hospital room?: A Lot   Help from Another: Climbing 3-5 steps with a railing?: A Lot     AM-PAC Score:  Raw Score: 13   Approx Degree of Impairment: 64.91%   Standardized Score (AM-PAC Scale): 36.74   CMS Modifier (G-Code): CL    FUNCTIONAL ABILITY STATUS  Functional Mobility/Gait Assessment  Gait Assistance: Minimum assistance;Moderate assistance  Distance (ft): 8ft; 11ft with chair follow  Assistive Device: Rolling walker  Pattern: L Steppage;R Steppage (decreased  pushpa speed, step-to gait pattern, flexed posture. Cues for appropriate sequencing and safe management of RW with turns. Mod A for initial bout (8ft) imrpving to Min A for 2nd bout (11ft). Distance ambulated limited by fatigue)  Sit to Stand: moderate assist and assist x 2. Cues for appropriate UE positioning with transitional movements. Cues for anterior<>posterior weight shift to gain momentum to stand. Required Mod A to maintain static standing with bilateral UE support on RW.   ~4 minute seated rest breaks between bouts 2* fatigue.   Stand>sit: Mod A x 1. Cues for eccentric lowering from stand position.     Skilled Therapy Provided: Patient received sitting in bedside chair with family present. RN approved activity. Therapist educated pt on POC and physiological benefits of mobilization. Reviewed post operative WENDY protocol. Patient agreeable to participate. Primary complaint is LLE pain which he rates at 2 out of 10 per pain scale. *Vitals stable throughouts.     The patient's Approx Degree of Impairment: 64.91% has been calculated based on documentation in the [facility] '6 clicks' Inpatient Daily Activity Short Form.  Research supports that patients with this level of impairment may benefit from rehab.  Final disposition will be made by interdisciplinary medical team.      Patient End of Session: Up in chair;Needs met;Call light within reach;RN aware of session/findings;Alarm set;Family present    CURRENT GOALS   Gait Training: Goals to be met by: 10/29/24 (UPDATED on 10/21).   Patient Goal Patient's self-stated goal is: to return to PLOF   Goal #1 Patient is able to demonstrate supine - sit EOB @ level: moderate assistance      Goal #1   Current Status NT   Goal #2 Patient is able to demonstrate transfers EOB to/from Chair/Wheelchair at assistance level: moderate assistance with  least restrictive device      Goal #2  Current Status Mod A x 1-2 from chair sit<>stand   Goal #3  Patient is able to ambulate 20  feet using rolling walker at assistance level: CGA   Goal #3   Current Status  Min/Mod A with RW and chair follow. 8ft, 11ft   Goal #5 Patient to demonstrate independence with home activity/exercise instructions provided to patient in preparation for discharge.   Goal #5   Current Status  IN PROGRESS   Gait trainin minutes  Therapeutic Activity: 9 minutes

## 2024-10-21 NOTE — PROGRESS NOTES
[facility]      [facility] Cardiology Progress Note    Ollie Hernández Patient Status:  Inpatient    1945 MRN [id number]   Location [facility] 2W/SW Attending Pranay Michel MD   Hosp Day # 23 PCP Wili Parmar MD       Impression/Plan:  79 year old male presenting with:    Impression:  Hip fracture Left - S/P L anterior total hip arthroplasty 10/4/24  Acute resp failure, aspiration and/or pulmonary edema; intubated 2024 extubated 10/6  ESRD on HD  DM  PAF, currently SR. S/p watchman device 24  HTN  HL, on statin PTA  Acute on chronic diastolic CHF  CAD s/p [place] circ 24   Coffee grounds in OG tube  -Slight decline in Hb post-op  Elevated troponin, likely demand ischemia  VT-ns  1:24 AM 10/14/24      Plan:  - s/p G tube 10/19/24, ok to restart coreg via g tube. Will transition to oral amio today  - Cont statin   - Plavix restarted 10/8. Hb stable taking PO  monitor for bleeding (recent Watchman implant 2024) consider restarting ASA 81 if Hb con't to remain stable     - Monitor on tele  - Reviewed w/ wife and RN's  at bedside   - Not clear cause for sinus tachy possibly pain , hypotension  BB held rebound  , low volume. Hb has been stable.   - EF 55%  TERRI 24 restart BB  when able taking PO   - Dialysis as scheduled         Hold Diltiazem and  Dig.   - Plavix on hold for possible g-tube restart when able for recent stent          Subjective:     Feels the same.      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)    Atherosclerosis of native arteries of extremities with intermittent claudication, bilateral legs (HCC)    Primary hypertension    Smokers' cough (HCC)    Unstable angina (HCC)    Lower GI bleed    ESRD (end stage renal disease) on dialysis (HCC)    PAF (paroxysmal atrial fibrillation) (HCC)    AVM (arteriovenous malformation) of colon    ABLA (acute blood loss anemia)    Rectal bleeding    Anticoagulated    Antiplatelet or antithrombotic long-term use    Lower GI bleeding    ESRD on hemodialysis (HCC)    GI bleed    Closed displaced midcervical fracture of left femur with delayed healing    ESRD (end stage renal disease) (HCC)    Closed fracture of left hip (HCC)    Hyperphosphatemia    Respiratory failure (HCC)    Anemia    Palliative care by specialist       Objective:   Temp: 98.7 °F (37.1 °C)  Pulse: 75  Resp: 20  BP: 118/65    Intake/Output:     Intake/Output Summary (Last 24 hours) at 10/21/2024 1308  Last data filed at 10/21/2024 1228  Gross per 24 hour   Intake 2366.37 ml   Output --   Net 2366.37 ml         Last 3 Weights   10/21/24 0449 137 lb 4.8 oz (62.3 kg)   10/20/24 0649 134 lb 3.2 oz (60.9 kg)   10/18/24 0441 131 lb 1.6 oz (59.5 kg)   10/05/24 0600 144 lb 10 oz (65.6 kg)   10/04/24 0600 145 lb 8.1 oz (66 kg)   10/02/24 1310 156 lb 4.9 oz (70.9 kg)   10/02/24 0800 143 lb 11.8 oz (65.2 kg)   10/01/24 0600 160 lb 0.9 oz (72.6 kg)   09/30/24 0400 164 lb 0.4 oz (74.4 kg)   09/28/24 1649 155 lb 11.2 oz (70.6 kg)   08/22/24 1311 159 lb 12.8 oz (72.5 kg)   08/20/24 0933 158 lb (71.7 kg)       Tele:    A Flutter > NSR     Physical Exam:    General: Awake   HEENT: Normocephalic, anicteric sclera NG tube out   Neck: supple  Cardiac: Regular rhythm. S1, S2 normal. No murmur, pericardial rub, S3, thrill, heave or extra cardiac sounds.  Lungs: Coarse breath sounds bilat  Abdomen: Soft, non-distended G-Tube   Extremities: Without clubbing, cyanosis or edema.   SCD boots Missing digits R hand   Neurologic: Alert+confused   Skin: Warm and dry.     Laboratory/Data:    Labs:         Recent Labs   Lab 10/17/24  0412 10/18/24  0633 10/19/24  0723 10/20/24  0448 10/21/24  0656   WBC 14.6* 17.0* 12.8* 13.3* 14.9*   HGB 9.8* 9.5* 8.0* 8.5* 7.9*   .6* 104.7* 96.9 95.1 97.6   .0 255.0 235.0 243.0 220.0       Recent Labs   Lab 10/16/24  0421 10/17/24  0412 10/18/24  0849 10/19/24  0723 10/20/24  0448 10/21/24  0656    138 133* 134* 133* 133*   K 4.7 4.6 4.2 3.6 3.6 3.8    100 100 98 96* 93*   CO2 25.0 26.0 19.0* 27.0 26.0 27.0   BUN 97* 57* 72* 49* 59* 72*   CREATSERUM 8.70* 6.37* 7.66* 5.18* 6.45* 7.56*   CA 10.1 9.9 9.4 9.2 9.1 9.2   MG 2.4 2.2 2.1 1.8 1.8  --    PHOS 7.6* 7.4* 6.4*  --  5.3*  --    * 177* 203* 133* 202* 206*       Recent Labs   Lab 10/17/24  0412   ALT 19   AST 39*   ALB 3.8       No results for input(s): \"TROP\" in the last 168 hours.    Allergies:   Allergies   Allergen Reactions    Adhesive Tape OTHER (SEE COMMENTS)     Severe rashes    Dust Mite Extract RASH       Medications:  Current Facility-Administered Medications   Medication Dose Route Frequency    [START ON 10/23/2024] sodium chloride 0.9 % IV bolus 100 mL  100 mL Intravenous Q30 Min PRN    And    [START ON 10/23/2024] albumin human (Albumin) 25% injection 25 g  25 g Intravenous PRN Dialysis    [START ON 10/23/2024] heparin (Porcine) 1000 UNIT/ML injection 1,500 Units  1.5 mL Intracatheter Once    sodium ferric gluconate (Ferrlecit) 125 mg in sodium chloride 0.9% 100mL IVPB premix  125 mg Intravenous Daily    dextrose 10% infusion (TPN no rate)   Intravenous Continuous PRN    pancrelipase (Lip-Prot-Amyl) (Zenpep) DR particles cap 10,000 Units  10,000 Units Per G Tube PRN    And    sodium bicarbonate tab 325 mg  325 mg Per G Tube PRN    atorvastatin (Lipitor) tab 40 mg  40 mg Per G Tube Nightly    clopidogrel (Plavix) tab 75 mg  75 mg Per G Tube Daily    lansoprazole  (Prevacid Solutab) disintegrating tab 30 mg  30 mg Per G Tube QAM AC    HYDROcodone-acetaminophen (Norco) 5-325 MG per tab 1 tablet  1 tablet Per G Tube Q6H PRN    acetaminophen (Tylenol) 160 MG/5ML oral liquid 650 mg  650 mg Per G Tube Q4H PRN    carvedilol (Coreg) tab 25 mg  25 mg Per G Tube BID    insulin regular human (Novolin R, Humulin R) 100 UNIT/ML injection 1-7 Units  1-7 Units Subcutaneous 4 times per day    amiodarone (Cordarone) 450 mg in dextrose 5% 250 mL infusion  0.5 mg/min Intravenous Continuous    heparin (Porcine) 1000 UNIT/ML injection 1,500 Units  1.5 mL Intravenous PRN Dialysis    lidocaine-prilocaine (Emla) 2.5-2.5 % cream   Topical PRN    dextrose 10% infusion (TPN no rate)   Intravenous Continuous PRN    ipratropium-albuterol (Duoneb) 0.5-2.5 (3) MG/3ML inhalation solution 3 mL  3 mL Nebulization 2 times daily    ipratropium-albuterol (Duoneb) 0.5-2.5 (3) MG/3ML inhalation solution 3 mL  3 mL Nebulization Q6H PRN    influenza virus trivalent high dose PF (Fluzone HD) 0.5 mL injection (ages >/= 65 years) 0.5 mL  0.5 mL Intramuscular Prior to discharge    sevelamer carbonate (Renvela) tab 800 mg  800 mg Oral TID CC    [Held by provider] insulin aspart (NovoLOG) 100 Units/mL FlexPen 1-7 Units  1-7 Units Subcutaneous TID CC and HS    HYDROmorphone (Dilaudid) 1 MG/ML injection 0.1 mg  0.1 mg Intravenous Q2H PRN    Or    HYDROmorphone (Dilaudid) 1 MG/ML injection 0.2 mg  0.2 mg Intravenous Q2H PRN    Or    HYDROmorphone (Dilaudid) 1 MG/ML injection 0.4 mg  0.4 mg Intravenous Q2H PRN    epoetin donna (Epogen, Procrit) 5,000 Units injection  5,000 Units Subcutaneous Once per day on Monday Wednesday Friday    metoclopramide (Reglan) 5 mg/mL injection 10 mg  10 mg Intravenous Q24H PRN    ondansetron (Zofran) 4 MG/2ML injection 4 mg  4 mg Intravenous Q6H PRN    diphenhydrAMINE (Benadryl) cap/tab 25 mg  25 mg Oral Q4H PRN    Or    diphenhydrAMINE (Benadryl) 50 mg/mL  injection 12.5 mg  12.5 mg  Intravenous Q4H PRN    heparin (Porcine) 5000 UNIT/ML injection 5,000 Units  5,000 Units Subcutaneous Q8H [facility]    mineral oil-white petrolatum (Artificial Tears) 83-15 % ophthalmic ointment 1 Application  1 Application Both Eyes Nightly    lidocaine-menthol 4-1 % patch 2 patch  2 patch Transdermal Daily    hydrALAzine (Apresoline) 20 mg/mL injection 10 mg  10 mg Intravenous Q6H PRN    labetalol (Trandate) 5 mg/mL injection 10 mg  10 mg Intravenous Q4H PRN    polyethylene glycol (PEG 3350) (Miralax) 17 g oral packet 17 g  17 g Per NG Tube Daily PRN    bisacodyl (Dulcolax) 10 MG rectal suppository 10 mg  10 mg Rectal Daily PRN    albuterol (Ventolin HFA) 108 (90 Base) MCG/ACT inhaler 2 puff  2 puff Inhalation Q4H PRN    fluticasone propionate (Flonase) 50 MCG/ACT nasal suspension 2 spray  2 spray Nasal Daily PRN    glucose (Dex4) 15 GM/59ML oral liquid 15 g  15 g Oral Q15 Min PRN    Or    glucose (Glutose) 40% oral gel 15 g  15 g Oral Q15 Min PRN    Or    glucose-vitamin C (Dex-4) chewable tab 4 tablet  4 tablet Oral Q15 Min PRN    Or    dextrose 50% injection 50 mL  50 mL Intravenous Q15 Min PRN    Or    glucose (Dex4) 15 GM/59ML oral liquid 30 g  30 g Oral Q15 Min PRN    Or    glucose (Glutose) 40% oral gel 30 g  30 g Oral Q15 Min PRN    Or    glucose-vitamin C (Dex-4) chewable tab 8 tablet  8 tablet Oral Q15 Min PRN    fluticasone-salmeterol (Advair Diskus) 250-50 MCG/ACT inhaler 1 puff  1 puff Inhalation BID

## 2024-10-21 NOTE — SPIRITUAL CARE NOTE
Spiritual Care Visit Note    Patient Name: Ollie Hernández Date of Spiritual Care Visit: 10/21/24   : 1945 Primary Dx: Closed displaced midcervical fracture of left femur with delayed healing       Referred By: Referral From:     Spiritual Care Taxonomy:    Intended Effects: Build relationship of care and support    Methods: Assist with spiritual/Anabaptism practices;Demonstrate acceptance;Offer spiritual/Anabaptism support    Interventions: Acknowledge current situation;Acknowledge response to difficult experience;Active listening;Ask guided questions;Ask guided questions about osmin;Ask questions to bring forth feelings;Prayer for healing;Provide hospitality;Explain  role;Discuss concerns    Visit Type/Summary:     - Spiritual Care: Consulted with RN prior to visit. Offered empathic listening and emotional support. Provided information regarding how to contact Spiritual Care and left a Spiritual Care information card. Provided support for Patient's spiritual/Anabaptism requests. Offered prayer.  remains available for follow up.    Spiritual Care support can be requested via an Epic consult. For urgent/immediate needs, please contact the On Call  at: Jennifer: ext [phone]    Chaplain Resident, Yoon Nuñez PhD

## 2024-10-21 NOTE — PLAN OF CARE
Problem: Patient Centered Care  Goal: Patient preferences are identified and integrated in the patient's plan of care  Description: Interventions:  - What would you like us to know as we care for you? From home with family  - Provide timely, complete, and accurate information to patient/family  - Incorporate patient and family knowledge, values, beliefs, and cultural backgrounds into the planning and delivery of care  - Encourage patient/family to participate in care and decision-making at the level they choose  - Honor patient and family perspectives and choices  10/21/2024 1620 by Dolores Orta RN  Outcome: Progressing  10/21/2024 1250 by Dolores Orta RN  Outcome: Progressing     Problem: Diabetes/Glucose Control  Goal: Glucose maintained within prescribed range  Description: INTERVENTIONS:  - Monitor Blood Glucose as ordered  - Assess for signs and symptoms of hyperglycemia and hypoglycemia  - Administer ordered medications to maintain glucose within target range  - Assess barriers to adequate nutritional intake and initiate nutrition consult as needed  - Instruct patient on self management of diabetes  10/21/2024 1620 by Dolores Orta RN  Outcome: Progressing  10/21/2024 1250 by Dolores Orta RN  Outcome: Progressing     Problem: Patient/Family Goals  Goal: Patient/Family Long Term Goal  Description: Patient's Long Term Goal: to not have L hip pain    Interventions:  - follow medical regimen  - See additional Care Plan goals for specific interventions  10/21/2024 1620 by Dolores Orta RN  Outcome: Progressing  10/21/2024 1250 by Dolores Orta RN  Outcome: Progressing  Goal: Patient/Family Short Term Goal  Description: Patient's Short Term Goal: to go back home    Interventions:   - follow medical regimen  - See additional Care Plan goals for specific interventions  10/21/2024 1620 by Dolores Orta RN  Outcome: Progressing  10/21/2024 1250 by Dolores Orta RN  Outcome: Progressing      Problem: PAIN - ADULT  Goal: Verbalizes/displays adequate comfort level or patient's stated pain goal  Description: INTERVENTIONS:  - Encourage pt to monitor pain and request assistance  - Assess pain using appropriate pain scale  - Administer analgesics based on type and severity of pain and evaluate response  - Implement non-pharmacological measures as appropriate and evaluate response  - Consider cultural and social influences on pain and pain management  - Manage/alleviate anxiety  - Utilize distraction and/or relaxation techniques  - Monitor for opioid side effects  - Notify MD/LIP if interventions unsuccessful or patient reports new pain  - Anticipate increased pain with activity and pre-medicate as appropriate  10/21/2024 1620 by Dolores Orta RN  Outcome: Progressing  10/21/2024 1250 by Dolores Orta RN  Outcome: Progressing     Problem: SAFETY ADULT - FALL  Goal: Free from fall injury  Description: INTERVENTIONS:  - Assess pt frequently for physical needs  - Identify cognitive and physical deficits and behaviors that affect risk of falls.  - [place] fall precautions as indicated by assessment.  - Educate pt/family on patient safety including physical limitations  - Instruct pt to call for assistance with activity based on assessment  - Modify environment to reduce risk of injury  - Provide assistive devices as appropriate  - Consider OT/PT consult to assist with strengthening/mobility  - Encourage toileting schedule  10/21/2024 1620 by Dolores Orta RN  Outcome: Progressing  10/21/2024 1250 by Dolores Orta RN  Outcome: Progressing     Problem: DISCHARGE PLANNING  Goal: Discharge to home or other facility with appropriate resources  Description: INTERVENTIONS:  - Identify barriers to discharge w/pt and caregiver  - Include patient/family/discharge partner in discharge planning  - Arrange for needed discharge resources and transportation as appropriate  - Identify discharge learning needs  (meds, wound care, etc)  - Arrange for interpreters to assist at discharge as needed  - Consider post-discharge preferences of patient/family/discharge partner  - Complete POLST form as appropriate  - Assess patient's ability to be responsible for managing their own health  - Refer to Case Management Department for coordinating discharge planning if the patient needs post-hospital services based on physician/LIP order or complex needs related to functional status, cognitive ability or social support system  10/21/2024 1620 by Dolores Orta RN  Outcome: Progressing  10/21/2024 1250 by Dolores Orta RN  Outcome: Progressing     Problem: CARDIOVASCULAR - ADULT  Goal: Maintains optimal cardiac output and hemodynamic stability  Description: INTERVENTIONS:  - Monitor vital signs, rhythm, and trends  - Monitor for bleeding, hypotension and signs of decreased cardiac output  - Evaluate effectiveness of vasoactive medications to optimize hemodynamic stability  - Monitor arterial and/or venous puncture sites for bleeding and/or hematoma  - Assess quality of pulses, skin color and temperature  - Assess for signs of decreased coronary artery perfusion - ex. Angina  - Evaluate fluid balance, assess for edema, trend weights  10/21/2024 1620 by Dolores Orta RN  Outcome: Progressing  10/21/2024 1250 by Dolores Orta RN  Outcome: Progressing  Goal: Absence of cardiac arrhythmias or at baseline  Description: INTERVENTIONS:  - Continuous cardiac monitoring, monitor vital signs, obtain 12 lead EKG if indicated  - Evaluate effectiveness of antiarrhythmic and heart rate control medications as ordered  - Initiate emergency measures for life threatening arrhythmias  - Monitor electrolytes and administer replacement therapy as ordered  10/21/2024 1620 by Dolores Orta RN  Outcome: Progressing  10/21/2024 1250 by Dolores Orta RN  Outcome: Progressing     Problem: RESPIRATORY - ADULT  Goal: Achieves optimal ventilation and  oxygenation  Description: INTERVENTIONS:  - Assess for changes in respiratory status  - Assess for changes in mentation and behavior  - Position to facilitate oxygenation and minimize respiratory effort  - Oxygen supplementation based on oxygen saturation or ABGs  - Provide Smoking Cessation handout, if applicable  - Encourage broncho-pulmonary hygiene including cough, deep breathe, Incentive Spirometry  - Assess the need for suctioning and perform as needed  - Assess and instruct to report SOB or any respiratory difficulty  - Respiratory Therapy support as indicated  - Manage/alleviate anxiety  - Monitor for signs/symptoms of CO2 retention  10/21/2024 1620 by Dolores Orta, RN  Outcome: Progressing  10/21/2024 1250 by Dolores Orta, RN  Outcome: Progressing     Problem: GASTROINTESTINAL - ADULT  Goal: Maintains or returns to baseline bowel function  Description: INTERVENTIONS:  - Assess bowel function  - Maintain adequate hydration with IV or PO as ordered and tolerated  - Evaluate effectiveness of GI medications  - Encourage mobilization and activity  - Obtain nutritional consult as needed  - Establish a toileting routine/schedule  - Consider collaborating with pharmacy to review patient's medication profile  10/21/2024 1620 by Dolores Orta, RN  Outcome: Progressing  10/21/2024 1250 by Dolores Orta, RN  Outcome: Progressing

## 2024-10-21 NOTE — PLAN OF CARE
Problem: Patient Centered Care  Goal: Patient preferences are identified and integrated in the patient's plan of care  Description: Interventions:  - What would you like us to know as we care for you? From home with family  - Provide timely, complete, and accurate information to patient/family  - Incorporate patient and family knowledge, values, beliefs, and cultural backgrounds into the planning and delivery of care  - Encourage patient/family to participate in care and decision-making at the level they choose  - Honor patient and family perspectives and choices  Outcome: Progressing     Problem: Diabetes/Glucose Control  Goal: Glucose maintained within prescribed range  Description: INTERVENTIONS:  - Monitor Blood Glucose as ordered  - Assess for signs and symptoms of hyperglycemia and hypoglycemia  - Administer ordered medications to maintain glucose within target range  - Assess barriers to adequate nutritional intake and initiate nutrition consult as needed  - Instruct patient on self management of diabetes  Outcome: Progressing     Problem: PAIN - ADULT  Goal: Verbalizes/displays adequate comfort level or patient's stated pain goal  Description: INTERVENTIONS:  - Encourage pt to monitor pain and request assistance  - Assess pain using appropriate pain scale  - Administer analgesics based on type and severity of pain and evaluate response  - Implement non-pharmacological measures as appropriate and evaluate response  - Consider cultural and social influences on pain and pain management  - Manage/alleviate anxiety  - Utilize distraction and/or relaxation techniques  - Monitor for opioid side effects  - Notify MD/LIP if interventions unsuccessful or patient reports new pain  - Anticipate increased pain with activity and pre-medicate as appropriate  Outcome: Progressing     Problem: SAFETY ADULT - FALL  Goal: Free from fall injury  Description: INTERVENTIONS:  - Assess pt frequently for physical needs  -  Identify cognitive and physical deficits and behaviors that affect risk of falls.  - [place] fall precautions as indicated by assessment.  - Educate pt/family on patient safety including physical limitations  - Instruct pt to call for assistance with activity based on assessment  - Modify environment to reduce risk of injury  - Provide assistive devices as appropriate  - Consider OT/PT consult to assist with strengthening/mobility  - Encourage toileting schedule  Outcome: Progressing     Problem: DISCHARGE PLANNING  Goal: Discharge to home or other facility with appropriate resources  Description: INTERVENTIONS:  - Identify barriers to discharge w/pt and caregiver  - Include patient/family/discharge partner in discharge planning  - Arrange for needed discharge resources and transportation as appropriate  - Identify discharge learning needs (meds, wound care, etc)  - Arrange for interpreters to assist at discharge as needed  - Consider post-discharge preferences of patient/family/discharge partner  - Complete POLST form as appropriate  - Assess patient's ability to be responsible for managing their own health  - Refer to Case Management Department for coordinating discharge planning if the patient needs post-hospital services based on physician/LIP order or complex needs related to functional status, cognitive ability or social support system  Outcome: Progressing     Problem: CARDIOVASCULAR - ADULT  Goal: Maintains optimal cardiac output and hemodynamic stability  Description: INTERVENTIONS:  - Monitor vital signs, rhythm, and trends  - Monitor for bleeding, hypotension and signs of decreased cardiac output  - Evaluate effectiveness of vasoactive medications to optimize hemodynamic stability  - Monitor arterial and/or venous puncture sites for bleeding and/or hematoma  - Assess quality of pulses, skin color and temperature  - Assess for signs of decreased coronary artery perfusion - ex. Angina  - Evaluate fluid  balance, assess for edema, trend weights  Outcome: Progressing  Goal: Absence of cardiac arrhythmias or at baseline  Description: INTERVENTIONS:  - Continuous cardiac monitoring, monitor vital signs, obtain 12 lead EKG if indicated  - Evaluate effectiveness of antiarrhythmic and heart rate control medications as ordered  - Initiate emergency measures for life threatening arrhythmias  - Monitor electrolytes and administer replacement therapy as ordered  Outcome: Progressing     Problem: RESPIRATORY - ADULT  Goal: Achieves optimal ventilation and oxygenation  Description: INTERVENTIONS:  - Assess for changes in respiratory status  - Assess for changes in mentation and behavior  - Position to facilitate oxygenation and minimize respiratory effort  - Oxygen supplementation based on oxygen saturation or ABGs  - Provide Smoking Cessation handout, if applicable  - Encourage broncho-pulmonary hygiene including cough, deep breathe, Incentive Spirometry  - Assess the need for suctioning and perform as needed  - Assess and instruct to report SOB or any respiratory difficulty  - Respiratory Therapy support as indicated  - Manage/alleviate anxiety  - Monitor for signs/symptoms of CO2 retention  Outcome: Progressing     Problem: GASTROINTESTINAL - ADULT  Goal: Maintains or returns to baseline bowel function  Description: INTERVENTIONS:  - Assess bowel function  - Maintain adequate hydration with IV or PO as ordered and tolerated  - Evaluate effectiveness of GI medications  - Encourage mobilization and activity  - Obtain nutritional consult as needed  - Establish a toileting routine/schedule  - Consider collaborating with pharmacy to review patient's medication profile  Outcome: Progressing

## 2024-10-21 NOTE — CM/SW NOTE
09:40AM  Clinical updates - including TF diet and HD flowsheets from 10/14, 10/16, and 10/18 sent in [place].    SW requested PT notify SW when updated note is available - they have attempted a couple times w/ pt refusing to participate.    Updated PT/OT notes will be needed for insurance auth.    SW spoke to [place] w/ [facility] (max assist) set for WILL CALL through 10/23. PCS completed and will need date added day of actual DC.    10:20AM  Notified by DSC Maria Isabel - insurance auth approved through 10/23.     11:40AM  Notified by ZAIDA Zuniga - TF diet updated/changed. New TF diet order sent to [facility] in [place].    Received confirmation that onsite HD approval is still valid.    PLAN: [facility] w/ onsite HD, Ambulance on WC, PCS needs date - pending med clear      SW/CM to remain available for support and/or discharge planning.       MS LucinaW, LSW [id number]

## 2024-10-22 VITALS
BODY MASS INDEX: 22.68 KG/M2 | DIASTOLIC BLOOD PRESSURE: 44 MMHG | OXYGEN SATURATION: 94 % | WEIGHT: 132.88 LBS | TEMPERATURE: 99 F | RESPIRATION RATE: 18 BRPM | SYSTOLIC BLOOD PRESSURE: 109 MMHG | HEIGHT: 64.02 IN | HEART RATE: 79 BPM

## 2024-10-22 LAB
ALBUMIN SERPL-MCNC: 3.1 G/DL (ref 3.2–4.8)
ANION GAP SERPL CALC-SCNC: 8 MMOL/L (ref 0–18)
ATRIAL RATE: 75 BPM
BASOPHILS # BLD AUTO: 0.04 X10(3) UL (ref 0–0.2)
BASOPHILS NFR BLD AUTO: 0.3 %
BUN BLD-MCNC: 49 MG/DL (ref 9–23)
BUN/CREAT SERPL: 9.4 (ref 10–20)
CALCIUM BLD-MCNC: 9.6 MG/DL (ref 8.7–10.4)
CHLORIDE SERPL-SCNC: 98 MMOL/L (ref 98–112)
CO2 SERPL-SCNC: 28 MMOL/L (ref 21–32)
CREAT BLD-MCNC: 5.22 MG/DL
DEPRECATED RDW RBC AUTO: 60 FL (ref 35.1–46.3)
EGFRCR SERPLBLD CKD-EPI 2021: 11 ML/MIN/1.73M2 (ref 60–?)
EOSINOPHIL # BLD AUTO: 0.25 X10(3) UL (ref 0–0.7)
EOSINOPHIL NFR BLD AUTO: 1.6 %
ERYTHROCYTE [DISTWIDTH] IN BLOOD BY AUTOMATED COUNT: 16.9 % (ref 11–15)
GLUCOSE BLD-MCNC: 204 MG/DL (ref 70–99)
GLUCOSE BLDC GLUCOMTR-MCNC: 221 MG/DL (ref 70–99)
GLUCOSE BLDC GLUCOMTR-MCNC: 225 MG/DL (ref 70–99)
GLUCOSE BLDC GLUCOMTR-MCNC: 265 MG/DL (ref 70–99)
HCT VFR BLD AUTO: 24.2 %
HGB BLD-MCNC: 7.7 G/DL
IMM GRANULOCYTES # BLD AUTO: 0.2 X10(3) UL (ref 0–1)
IMM GRANULOCYTES NFR BLD: 1.3 %
LYMPHOCYTES # BLD AUTO: 0.78 X10(3) UL (ref 1–4)
LYMPHOCYTES NFR BLD AUTO: 5.1 %
MCH RBC QN AUTO: 30.8 PG (ref 26–34)
MCHC RBC AUTO-ENTMCNC: 31.8 G/DL (ref 31–37)
MCV RBC AUTO: 96.8 FL
MONOCYTES # BLD AUTO: 0.91 X10(3) UL (ref 0.1–1)
MONOCYTES NFR BLD AUTO: 6 %
NEUTROPHILS # BLD AUTO: 13 X10 (3) UL (ref 1.5–7.7)
NEUTROPHILS # BLD AUTO: 13 X10(3) UL (ref 1.5–7.7)
NEUTROPHILS NFR BLD AUTO: 85.7 %
OSMOLALITY SERPL CALC.SUM OF ELEC: 297 MOSM/KG (ref 275–295)
P AXIS: 56 DEGREES
P-R INTERVAL: 140 MS
PHOSPHATE SERPL-MCNC: 3 MG/DL (ref 2.4–5.1)
PLATELET # BLD AUTO: 191 10(3)UL (ref 150–450)
POTASSIUM SERPL-SCNC: 3.7 MMOL/L (ref 3.5–5.1)
Q-T INTERVAL: 348 MS
QRS DURATION: 78 MS
QTC CALCULATION (BEZET): 388 MS
R AXIS: 8 DEGREES
RBC # BLD AUTO: 2.5 X10(6)UL
SODIUM SERPL-SCNC: 134 MMOL/L (ref 136–145)
T AXIS: 106 DEGREES
VENTRICULAR RATE: 75 BPM
WBC # BLD AUTO: 15.2 X10(3) UL (ref 4–11)

## 2024-10-22 PROCEDURE — 99232 SBSQ HOSP IP/OBS MODERATE 35: CPT | Performed by: INTERNAL MEDICINE

## 2024-10-22 PROCEDURE — 99239 HOSP IP/OBS DSCHRG MGMT >30: CPT | Performed by: HOSPITALIST

## 2024-10-22 RX ORDER — HYDROCODONE BITARTRATE AND ACETAMINOPHEN 5; 325 MG/1; MG/1
1 TABLET ORAL EVERY 6 HOURS PRN
Qty: 20 TABLET | Refills: 0 | Status: SHIPPED | OUTPATIENT
Start: 2024-10-22

## 2024-10-22 RX ORDER — LANSOPRAZOLE 30 MG/1
30 TABLET, ORALLY DISINTEGRATING, DELAYED RELEASE ORAL
Qty: 30 TABLET | Refills: 0 | Status: SHIPPED | OUTPATIENT
Start: 2024-10-22

## 2024-10-22 RX ORDER — AMIODARONE HYDROCHLORIDE 200 MG/1
200 TABLET ORAL DAILY
Qty: 30 TABLET | Refills: 0 | Status: SHIPPED | OUTPATIENT
Start: 2024-10-22

## 2024-10-22 NOTE — DISCHARGE SUMMARY
[facility] Hospitalist Discharge Summary   Patient ID:  Ollie Hernández  [id number]  79 year old  4/28/1945    Admit date: 9/28/2024  Discharge date: 10/22/2024  Primary Care Physician: Wili Parmar MD   Attending Physician: Pranay Michel MD   Consults:   Consultants         Provider   Role Specialty     Yash Sheppard MD      Consulting Physician GASTROENTEROLOGY     Luis Orozco MD      Consulting Physician Interventional, Cardiology     Emerson Julio MD      Consulting Physician Cardiovascular Diseases     Talha Mcnally MD      Consulting Physician Interventional, Cardiology     Humberto Murphy MD      Consulting Physician SURGERY, ORTHOPEDIC     Jimmie Landeros MD      Consulting Physician PULMONARY DISEASES     Reji Clancy MD      Consulting Physician PULMONARY DISEASES     Elke Martinez MD      Consulting Physician NEPHROLOGY     Anil Pang MD      Consulting Physician SURGERY, ORTHOPEDIC            Discharge Diagnoses:   Closed displaced midcervical fracture of left femur with delayed healing    Reason for admission  Copied from admission H&P: ***    Hospital Course:  ***    EXAM:   GENERAL: no apparent distress, comfortable  NEURO: A/A Ox3, no focal deficits  RESP: non labored, CTAB/L  CARDIO: Regular, no murmur  ABD: soft, NT, ND  EXTREMITIES: no edema, no calf tenderness    Operative Procedures: Procedure(s) (LRB):  ESOPHAGOGASTRODUODENOSCOPY (EGD) (N/A)  PERCUTANEOUS ENDOSCOPIC GASTROSTOMY PLACEMENT/JEJUNOSTOMY TUBE PLACEMENT (N/A)    Discharge Instructions     Medication List        START taking these medications      amiodarone 200 MG Tabs  Commonly known as: Pacerone  1 tablet (200 mg total) by Per G Tube route daily.     HYDROcodone-acetaminophen 5-325 MG Tabs  Commonly known as: Norco  1 tablet by Per G Tube route every 6 (six) hours as needed.     lansoprazole 30 MG Tbdd  Commonly known as: Prevacid Solutab  1 tablet (30 mg total) by Per G Tube route every morning before  breakfast.            CHANGE how you take these medications      fluticasone propionate 50 MCG/ACT Susp  Commonly known as: Flonase  2 sprays by Nasal route daily.  What changed:   when to take this  reasons to take this            CONTINUE taking these medications      acetaminophen 500 MG Tabs  Commonly known as: Tylenol Extra Strength     albuterol 108 (90 Base) MCG/ACT Aers  Commonly known as: ProAir HFA  Inhale 2 puffs into the lungs every 4 (four) hours as needed for Wheezing.     aspirin 81 MG Tbec  Take 1 tablet (81 mg total) by mouth daily.     atorvastatin 40 MG Tabs  Commonly known as: Lipitor  Take 1 tablet (40 mg total) by mouth nightly.     Budesonide-Formoterol Fumarate 160-4.5 MCG/ACT Aero  Commonly known as: Symbicort  Inhale 2 puffs into the lungs 2 (two) times daily.     carvedilol 25 MG Tabs  Commonly known as: Coreg     cetirizine 10 MG Tabs  Commonly known as: ZyrTEC     Cholecalciferol 125 MCG (5000 UT) Tabs  Commonly known as: VITAMIN D-3     clopidogrel 75 MG Tabs  Commonly known as: Plavix     felodipine ER 10 MG Tb24  Commonly known as: Plendil  Take 1 tablet (10 mg total) by mouth daily.     polyethylene glycol (PEG 3350) 17 g Pack  Commonly known as: Miralax     Tradjenta 5 MG Tabs  Generic drug: linaGLIPtin  Take 1 tablet (5 mg total) by mouth daily.            STOP taking these medications      escitalopram 5 MG Tabs  Commonly known as: Lexapro     pantoprazole 40 MG Tbec  Commonly known as: Protonix               Where to Get Your Medications        These medications were sent to [id number] - [place], [place] - [address] [phone], [phone]  [address], [facility] [address]      Phone: [phone]   amiodarone 200 MG Tabs  lansoprazole 30 MG Tbdd       You can get these medications from any pharmacy    Bring a paper prescription for each of these medications  HYDROcodone-acetaminophen 5-325 MG Tabs         Activity: {discharge  activity:00766}  Diet: {diet:27313}  Wound Care: NA  Code Status: Full Code        Discharge Instructions         Resume services with [facility]  [phone]    Cont HD MWF.   Take pain medicine as needed. Cont tube feeds.           Important follow up:   Follow-up Information       Yash Sheppard MD. Call.    Specialty: GASTROENTEROLOGY  Why: As needed  Contact information:  [address]  [address]  [facility] [address]  [phone]               Wili Parmar MD Follow up in 1 week(s).    Specialty: Internal Medicine  Contact information:  [address]  [facility] [address]  [phone]               Herman Phelan MD Follow up in 2 week(s).    Specialties: Cardiac Electrophysiology, CARDIOLOGY  Contact information:  [address]  [address]  [facility] [address]  [phone]               Humberto Murphy MD Follow up in 2 week(s).    Specialty: SURGERY, ORTHOPEDIC  Contact information:  [address]  [address]  [facility] [address]  [phone]                             -PCP in [] within 7 days [] within 14 days [] other     Disposition: {disposition:42627}  Discharged Condition: {condition:43592}    Hospital Discharge Diagnoses: {Enter hospital discharge diagnoses here (please select the wildcards and then replace with free text; this allows us to save this data discretely for reporting purposes:5961::\"***\"}    Lace+ Score: 88  59-90 High Risk  29-58 Medium Risk  0-28   Low Risk.    TCM Follow-Up Recommendation:  {Care Managers will evaluate the need for follow-up for all patients ages 50+, and high/moderate risk patients ages 25-49. Low risk patients (LACE < 29) will only be evaluated if the \"Still recommend for TCM follow-up\" option is selected from this list.:1133}            Total Time Coordinating Care: Greater than 30 minutes    Patient had opportunity to ask questions, state understanding, and agree with therapeutic plan as outlined    Pranay Michel  MD  Hospitalist  10/22/2024

## 2024-10-22 NOTE — CM/SW NOTE
10/22/24 1400   Discharge disposition   Expected discharge disposition subacute   Post Acute Care Provider [facility] transportation [facility]       Notified by MD that pt is cleared for DC today.    Discussed w/ liaison Devi - confirmed they can accept next available. Notified her of Ambulance ETA for 5:15PM. Confirmed they received updated TF order yesterday for [facility] feeds.    RN An updated. She notified pt's wife via phone. Wife is requesting to ride w/ pt at DC. She is also aware that pt's CPAP/BiPAP will need to be brought w/ him.    SW spoke to [employer] w/ [facility] (max assist) set for ETA 2.5hrs (approx 5:15PM). PCS completed in [facility]- pt's RN to print w/ pt's AVS.    Report phone #[phone]       PLAN: [facility] w/ onsite HD, Ambulance ETA 5:15PM, PCS done            RUTH Verdugo, LSW [id number]

## 2024-10-22 NOTE — PROGRESS NOTES
[facility]  part of [facility]    Progress Note    Ollie Hernández Patient Status:  Inpatient    1945 MRN [id number]   Location [facility] 3W/SW Attending Pranay Michel MD   Hosp Day # 24 PCP Wili Parmar MD       Subjective:   Ollie Hernández is a(n) 79 year old male who I am seeing for ESRD    Resting      Objective:   /33 (BP Location: Right arm)   Pulse 75   Temp 99 °F (37.2 °C) (Axillary)   Resp 18   Ht 5' 4.02\" (1.626 m)   Wt 132 lb 14.4 oz (60.3 kg)   SpO2 94%   BMI 22.80 kg/m²      Intake/Output Summary (Last 24 hours) at 10/22/2024 1041  Last data filed at 10/22/2024 0915  Gross per 24 hour   Intake 1425 ml   Output 1500 ml   Net -75 ml     Wt Readings from Last 1 Encounters:   10/22/24 132 lb 14.4 oz (60.3 kg)       Exam  Gen: No acute distress, Heent: NC AT, mucous memb clear, neck supple  Pulm: Lungs clear, normal respiratory effort  CV: Heart with regular rate and rhythm, no edema  Abd: Abdomen soft, nontender, nondistended, no organomegaly, bowel sounds present  Skin: non-healing lesions and no symptoms reported  Psych: alert and oriented    Assessment and Plan:     1 - ESRD  Plan dialysis on .  This will be continued as a life-sustaining modality for the patient     If he is here tomorrow he will need dialysis    2 -aspiration pneumonia  Completed Zosyn.  Had a brief course of intubation  through .     He failed his video swallow and had evidence of aspiration so now he has a PEG tube.     3 -left hip fracture  Status post left hip arthroplasty on      4 -CAD/A-fib  On Plavix, Coreg, aspirin.     Status post watchman on      5 -anemia  On Epogen and IV iron     6 -secondary hyperparathyroidism  On Renvela               Results:     Recent Labs   Lab 10/19/24  0723 10/20/24  0448 10/21/24  0656 10/22/24  0750   RBC 2.57* 2.67* 2.49* 2.50*   HGB 8.0* 8.5* 7.9* 7.7*   HCT 24.9* 25.4*  24.3* 24.2*   MCV 96.9 95.1 97.6 96.8   MCH 31.1 31.8 31.7 30.8   MCHC 32.1 33.5 32.5 31.8   RDW 16.1* 16.6* 16.3* 16.9*   NEPRELIM 10.35*  --   --  13.00*   WBC 12.8* 13.3* 14.9* 15.2*   .0 243.0 220.0 191.0         Recent Labs   Lab 10/20/24  0448 10/21/24  0656 10/22/24  0750   * 206* 204*   BUN 59* 72* 49*   CREATSERUM 6.45* 7.56* 5.22*   CA 9.1 9.2 9.6   * 133* 134*   K 3.6 3.8 3.7   CL 96* 93* 98   CO2 26.0 27.0 28.0          No results found.        SONDRA MART MD  10/22/2024

## 2024-10-22 NOTE — PHYSICAL THERAPY NOTE
PHYSICAL THERAPY TREATMENT NOTE - INPATIENT     Room Number: 306/306-A       Presenting Problem: fall, s/p L WENDY  Co-Morbidities : Anemia    Asthma ([facility])   Back problem   BPH (benign prostatic hyperplasia)   Calculus of kidney   Cataract   Coronary atherosclerosis   Diabetes ([facility])   ESRD (end stage renal disease) on dialysis ([facility])   Essential hypertension   High blood pressure   High cholesterol   History of blood transfusion   Hyperlipidemia   Neuropathy    hands and feet   KRAIG on CPAP   Renal disorder   Sleep apnea   Visual impairment    glasses   Vocal cord paralysis, unilateral partial    Problem List  Principal Problem:    Closed displaced midcervical fracture of left femur with delayed healing  Active Problems:    ESRD (end stage renal disease) ([facility])    Closed fracture of left hip ([facility])    Hyperphosphatemia    Respiratory failure ([facility])    Anemia    Palliative care by specialist      PHYSICAL THERAPY ASSESSMENT   Patient demonstrates good  progress this session, goals  remain in progress.      Patient is requiring moderate assist and maximum assist as a result of the following impairments: decreased functional strength, decreased endurance/aerobic capacity, impaired sitting and standing balance, decreased muscular endurance, and medical status.     Patient continues to function below baseline with bed mobility, transfers, gait, stair negotiation, maintaining seated position, standing prolonged periods, and performing household tasks.  Next session anticipate patient to progress bed mobility, transfers, gait, maintaining seated position, and standing prolonged periods.  Physical Therapy will continue to follow patient for duration of hospitalization.    Patient continues to benefit from continued skilled PT services: to promote return to prior level of function and safety with continuous assistance and gradual rehabilitative therapy .    PLAN DURING HOSPITALIZATION  Nursing Mobility Recommendation :  (assist x  2)     PT Treatment Plan: Bed mobility;Body mechanics;Coordination;Endurance;Patient education;Gait training;Strengthening;Transfer training;Balance training  Frequency (Obs): Daily     SUBJECTIVE  \"I need another minute\"     OBJECTIVE  Precautions: WENDY - anterior;Aspiration    WEIGHT BEARING RESTRICTION  L Lower Extremity: Weight Bearing as Tolerated      PAIN ASSESSMENT   Ratin  Location: denies pain  Management Techniques: Activity promotion;Body mechanics;Repositioning    BALANCE  Static Sitting: Fair +  Dynamic Sitting: Fair  Static Standing: Poor +  Dynamic Standing: Poor    ACTIVITY TOLERANCE  Pulse: 84  Heart Rate Source: Monitor         O2 WALK  Oxygen Therapy  SPO2% on Room Air at Rest: 96  SPO2% Ambulation on Room Air: 96    AM-PAC '6-Clicks' INPATIENT SHORT FORM - BASIC MOBILITY  How much difficulty does the patient currently have...  Patient Difficulty: Turning over in bed (including adjusting bedclothes, sheets and blankets)?: A Lot   Patient Difficulty: Sitting down on and standing up from a chair with arms (e.g., wheelchair, bedside commode, etc.): A Lot   Patient Difficulty: Moving from lying on back to sitting on the side of the bed?: A Lot   How much help from another person does the patient currently need...   Help from Another: Moving to and from a bed to a chair (including a wheelchair)?: A Lot   Help from Another: Need to walk in hospital room?: A Lot   Help from Another: Climbing 3-5 steps with a railing?: A Lot     AM-PAC Score:  Raw Score: 12   Approx Degree of Impairment: 68.66%   Standardized Score (AM-PAC Scale): 35.33   CMS Modifier (G-Code): CL    FUNCTIONAL ABILITY STATUS  Functional Mobility/Gait Assessment  Gait Assistance: Moderate assistance  Distance (ft): 8ft; 6ft  Assistive Device: Rolling walker  Pattern: R Steppage;L Steppage;Shuffle (decreased pushpa speed, step-to gait pattern, flexed posture. Cues for appropriate sequencing and safe management of RW with turns.  Intermittent assist in LLE advancement. Pt requiring 3 minute seated rest break between bouts.)  Supine to Sit: minimal assist and moderate assist.Min to initiate, Mod to complete. Task was labored requiring increased time. Posterior lean requiring Mod A for sitting balance.   Sit to Stand: moderate assist and assist x 2. Cues for appropriate UE positioning with transitional movements. Instruction on pacing upon standing to allow body time to acclimate to change in position. Requiring Mod A for standing balance with bilateral UE support on RW.     Skilled Therapy Provided: Patient received supine in bed with family present. RN approved activity. Therapist educated pt on POC and benefits of mobilization. Cues for pacing and energy conservation techniques. Tolerated seated there ex (see details below).Patient agreeable to participate. Initially lethargic with alertness improving as activity initiated. Patient motivated to participate. Denies pain. Distance ambulated limited by fatigue, however, improved quality of steps in comparison to follow up treatment on 10/21.     The patient's Approx Degree of Impairment: 68.66% has been calculated based on documentation in the [facility] '6 clicks' Inpatient Daily Activity Short Form.  Research supports that patients with this level of impairment may benefit from rehab.  Final disposition will be made by interdisciplinary medical team.    THERAPEUTIC EXERCISES  Lower Extremity Ankle pumps  LAQ     Position Sitting       Patient End of Session: Up in chair;Needs met;Call light within reach;RN aware of session/findings;Alarm set;Family present;Hospital anti-slip socks    CURRENT GOALS   Goals to be met by: 10/29/24 (UPDATED on 10/21).   Patient Goal Patient's self-stated goal is: to return to PLOF   Goal #1 Patient is able to demonstrate supine - sit EOB @ level: moderate assistance      Goal #1   Current Status Min/Mod supine>sit   Goal #2 Patient is able to demonstrate transfers  EOB to/from Chair/Wheelchair at assistance level: moderate assistance with  least restrictive device      Goal #2  Current Status Mod A x 2 with RW   Goal #3  Patient is able to ambulate 20 feet using rolling walker at assistance level: CGA   Goal #3   Current Status Mod A with RW: 8ft, 6ft   Goal #5 Patient to demonstrate independence with home activity/exercise instructions provided to patient in preparation for discharge.   Goal #5   Current Status  IN PROGRESS     Gait Training: 15 minutes  Therapeutic Activity: 10 minutes

## 2024-10-22 NOTE — PLAN OF CARE
Pt's vital signs stable. Denies pain. Alert and oriented x4. Can be irritable at times. On room air. Refusing CPAP HS. Dressing clean, dry, and intact. Tolerating tube feeds. Heparin subcutaneous for DVT prophylaxis. Max assist. Appropriate repositioning provided. Safety precautions maintained. Bed locked in lowest position, call light within reach, and frequent rounding maintained. Plan for [facility] pending medical clearance.    Problem: Patient Centered Care  Goal: Patient preferences are identified and integrated in the patient's plan of care  Description: Interventions:  - What would you like us to know as we care for you? From home with family  - Provide timely, complete, and accurate information to patient/family  - Incorporate patient and family knowledge, values, beliefs, and cultural backgrounds into the planning and delivery of care  - Encourage patient/family to participate in care and decision-making at the level they choose  - Honor patient and family perspectives and choices  Outcome: Progressing     Problem: Diabetes/Glucose Control  Goal: Glucose maintained within prescribed range  Description: INTERVENTIONS:  - Monitor Blood Glucose as ordered  - Assess for signs and symptoms of hyperglycemia and hypoglycemia  - Administer ordered medications to maintain glucose within target range  - Assess barriers to adequate nutritional intake and initiate nutrition consult as needed  - Instruct patient on self management of diabetes  Outcome: Progressing     Problem: Patient/Family Goals  Goal: Patient/Family Long Term Goal  Description: Patient's Long Term Goal: to not have L hip pain    Interventions:  - follow medical regimen  - See additional Care Plan goals for specific interventions  Outcome: Progressing  Goal: Patient/Family Short Term Goal  Description: Patient's Short Term Goal: to go back home    Interventions:   - follow medical regimen  - See additional Care Plan goals for specific  interventions  Outcome: Progressing     Problem: PAIN - ADULT  Goal: Verbalizes/displays adequate comfort level or patient's stated pain goal  Description: INTERVENTIONS:  - Encourage pt to monitor pain and request assistance  - Assess pain using appropriate pain scale  - Administer analgesics based on type and severity of pain and evaluate response  - Implement non-pharmacological measures as appropriate and evaluate response  - Consider cultural and social influences on pain and pain management  - Manage/alleviate anxiety  - Utilize distraction and/or relaxation techniques  - Monitor for opioid side effects  - Notify MD/LIP if interventions unsuccessful or patient reports new pain  - Anticipate increased pain with activity and pre-medicate as appropriate  Outcome: Progressing     Problem: SAFETY ADULT - FALL  Goal: Free from fall injury  Description: INTERVENTIONS:  - Assess pt frequently for physical needs  - Identify cognitive and physical deficits and behaviors that affect risk of falls.  - [place] fall precautions as indicated by assessment.  - Educate pt/family on patient safety including physical limitations  - Instruct pt to call for assistance with activity based on assessment  - Modify environment to reduce risk of injury  - Provide assistive devices as appropriate  - Consider OT/PT consult to assist with strengthening/mobility  - Encourage toileting schedule  Outcome: Progressing     Problem: DISCHARGE PLANNING  Goal: Discharge to home or other facility with appropriate resources  Description: INTERVENTIONS:  - Identify barriers to discharge w/pt and caregiver  - Include patient/family/discharge partner in discharge planning  - Arrange for needed discharge resources and transportation as appropriate  - Identify discharge learning needs (meds, wound care, etc)  - Arrange for interpreters to assist at discharge as needed  - Consider post-discharge preferences of patient/family/discharge partner  -  Complete POLST form as appropriate  - Assess patient's ability to be responsible for managing their own health  - Refer to Case Management Department for coordinating discharge planning if the patient needs post-hospital services based on physician/LIP order or complex needs related to functional status, cognitive ability or social support system  Outcome: Progressing     Problem: CARDIOVASCULAR - ADULT  Goal: Maintains optimal cardiac output and hemodynamic stability  Description: INTERVENTIONS:  - Monitor vital signs, rhythm, and trends  - Monitor for bleeding, hypotension and signs of decreased cardiac output  - Evaluate effectiveness of vasoactive medications to optimize hemodynamic stability  - Monitor arterial and/or venous puncture sites for bleeding and/or hematoma  - Assess quality of pulses, skin color and temperature  - Assess for signs of decreased coronary artery perfusion - ex. Angina  - Evaluate fluid balance, assess for edema, trend weights  Outcome: Progressing  Goal: Absence of cardiac arrhythmias or at baseline  Description: INTERVENTIONS:  - Continuous cardiac monitoring, monitor vital signs, obtain 12 lead EKG if indicated  - Evaluate effectiveness of antiarrhythmic and heart rate control medications as ordered  - Initiate emergency measures for life threatening arrhythmias  - Monitor electrolytes and administer replacement therapy as ordered  Outcome: Progressing     Problem: RESPIRATORY - ADULT  Goal: Achieves optimal ventilation and oxygenation  Description: INTERVENTIONS:  - Assess for changes in respiratory status  - Assess for changes in mentation and behavior  - Position to facilitate oxygenation and minimize respiratory effort  - Oxygen supplementation based on oxygen saturation or ABGs  - Provide Smoking Cessation handout, if applicable  - Encourage broncho-pulmonary hygiene including cough, deep breathe, Incentive Spirometry  - Assess the need for suctioning and perform as needed  -  Assess and instruct to report SOB or any respiratory difficulty  - Respiratory Therapy support as indicated  - Manage/alleviate anxiety  - Monitor for signs/symptoms of CO2 retention  Outcome: Progressing     Problem: GASTROINTESTINAL - ADULT  Goal: Maintains or returns to baseline bowel function  Description: INTERVENTIONS:  - Assess bowel function  - Maintain adequate hydration with IV or PO as ordered and tolerated  - Evaluate effectiveness of GI medications  - Encourage mobilization and activity  - Obtain nutritional consult as needed  - Establish a toileting routine/schedule  - Consider collaborating with pharmacy to review patient's medication profile  Outcome: Progressing

## 2024-10-22 NOTE — PLAN OF CARE
Patient is from home w/ spouse and children. A&Ox3-4. Can be forgetful. Room air - CPAP used at night. Patient is a max assist. Bed in lowest position and locked. Call light in hand. Personal belongings w/in reach. Patient is ready for discharge. IV removed. Tele box removed and returned. Nurse  cleared. Report given to RN (Cornelius) at [facility].     Problem: Patient Centered Care  Goal: Patient preferences are identified and integrated in the patient's plan of care  Description: Interventions:  - What would you like us to know as we care for you? From home with family  - Provide timely, complete, and accurate information to patient/family  - Incorporate patient and family knowledge, values, beliefs, and cultural backgrounds into the planning and delivery of care  - Encourage patient/family to participate in care and decision-making at the level they choose  - Honor patient and family perspectives and choices  Outcome: Progressing     Problem: Diabetes/Glucose Control  Goal: Glucose maintained within prescribed range  Description: INTERVENTIONS:  - Monitor Blood Glucose as ordered  - Assess for signs and symptoms of hyperglycemia and hypoglycemia  - Administer ordered medications to maintain glucose within target range  - Assess barriers to adequate nutritional intake and initiate nutrition consult as needed  - Instruct patient on self management of diabetes  Outcome: Progressing     Problem: Patient/Family Goals  Goal: Patient/Family Long Term Goal  Description: Patient's Long Term Goal: to not have L hip pain    Interventions:  - follow medical regimen  - See additional Care Plan goals for specific interventions  Outcome: Progressing  Goal: Patient/Family Short Term Goal  Description: Patient's Short Term Goal: to go back home    Interventions:   - follow medical regimen  - See additional Care Plan goals for specific interventions  Outcome: Progressing     Problem: PAIN - ADULT  Goal:  Verbalizes/displays adequate comfort level or patient's stated pain goal  Description: INTERVENTIONS:  - Encourage pt to monitor pain and request assistance  - Assess pain using appropriate pain scale  - Administer analgesics based on type and severity of pain and evaluate response  - Implement non-pharmacological measures as appropriate and evaluate response  - Consider cultural and social influences on pain and pain management  - Manage/alleviate anxiety  - Utilize distraction and/or relaxation techniques  - Monitor for opioid side effects  - Notify MD/LIP if interventions unsuccessful or patient reports new pain  - Anticipate increased pain with activity and pre-medicate as appropriate  Outcome: Progressing     Problem: SAFETY ADULT - FALL  Goal: Free from fall injury  Description: INTERVENTIONS:  - Assess pt frequently for physical needs  - Identify cognitive and physical deficits and behaviors that affect risk of falls.  - [place] fall precautions as indicated by assessment.  - Educate pt/family on patient safety including physical limitations  - Instruct pt to call for assistance with activity based on assessment  - Modify environment to reduce risk of injury  - Provide assistive devices as appropriate  - Consider OT/PT consult to assist with strengthening/mobility  - Encourage toileting schedule  Outcome: Progressing     Problem: DISCHARGE PLANNING  Goal: Discharge to home or other facility with appropriate resources  Description: INTERVENTIONS:  - Identify barriers to discharge w/pt and caregiver  - Include patient/family/discharge partner in discharge planning  - Arrange for needed discharge resources and transportation as appropriate  - Identify discharge learning needs (meds, wound care, etc)  - Arrange for interpreters to assist at discharge as needed  - Consider post-discharge preferences of patient/family/discharge partner  - Complete POLST form as appropriate  - Assess patient's ability to be  responsible for managing their own health  - Refer to Case Management Department for coordinating discharge planning if the patient needs post-hospital services based on physician/LIP order or complex needs related to functional status, cognitive ability or social support system  Outcome: Progressing     Problem: CARDIOVASCULAR - ADULT  Goal: Maintains optimal cardiac output and hemodynamic stability  Description: INTERVENTIONS:  - Monitor vital signs, rhythm, and trends  - Monitor for bleeding, hypotension and signs of decreased cardiac output  - Evaluate effectiveness of vasoactive medications to optimize hemodynamic stability  - Monitor arterial and/or venous puncture sites for bleeding and/or hematoma  - Assess quality of pulses, skin color and temperature  - Assess for signs of decreased coronary artery perfusion - ex. Angina  - Evaluate fluid balance, assess for edema, trend weights  Outcome: Progressing  Goal: Absence of cardiac arrhythmias or at baseline  Description: INTERVENTIONS:  - Continuous cardiac monitoring, monitor vital signs, obtain 12 lead EKG if indicated  - Evaluate effectiveness of antiarrhythmic and heart rate control medications as ordered  - Initiate emergency measures for life threatening arrhythmias  - Monitor electrolytes and administer replacement therapy as ordered  Outcome: Progressing     Problem: RESPIRATORY - ADULT  Goal: Achieves optimal ventilation and oxygenation  Description: INTERVENTIONS:  - Assess for changes in respiratory status  - Assess for changes in mentation and behavior  - Position to facilitate oxygenation and minimize respiratory effort  - Oxygen supplementation based on oxygen saturation or ABGs  - Provide Smoking Cessation handout, if applicable  - Encourage broncho-pulmonary hygiene including cough, deep breathe, Incentive Spirometry  - Assess the need for suctioning and perform as needed  - Assess and instruct to report SOB or any respiratory difficulty  -  Respiratory Therapy support as indicated  - Manage/alleviate anxiety  - Monitor for signs/symptoms of CO2 retention  Outcome: Progressing     Problem: GASTROINTESTINAL - ADULT  Goal: Maintains or returns to baseline bowel function  Description: INTERVENTIONS:  - Assess bowel function  - Maintain adequate hydration with IV or PO as ordered and tolerated  - Evaluate effectiveness of GI medications  - Encourage mobilization and activity  - Obtain nutritional consult as needed  - Establish a toileting routine/schedule  - Consider collaborating with pharmacy to review patient's medication profile  Outcome: Progressing

## 2024-10-22 NOTE — PROGRESS NOTES
[facility]      [facility] Cardiology Progress Note    Ollie Hernández Patient Status:  Inpatient    1945 MRN [id number]   Location [facility] 2W/SW Attending Pranay Michel MD   Hosp Day # 24 PCP Wili Parmar MD       Impression/Plan:  79 year old male presenting with:    Impression:  Hip fracture Left - S/P L anterior total hip arthroplasty 10/4/24  Acute resp failure, aspiration and/or pulmonary edema; intubated 2024 extubated 10/6  ESRD on HD  DM  PAF, currently SR. S/p watchman device 24  HTN  HL, on statin PTA  Acute on chronic diastolic CHF  CAD s/p [place] circ 24   Coffee grounds in OG tube  -Slight decline in Hb post-op  Elevated troponin, likely demand ischemia  VT-ns  1:24 AM 10/14/24      Plan:  - s/p G tube 10/19/24, ok to restart coreg via g tube. Will transition to oral amio today  - Cont statin   - Plavix restarted 10/8. Hb stable taking PO  monitor for bleeding (recent Watchman implant 2024) consider restarting ASA 81 if Hb con't to remain stable     - Monitor on tele  - Reviewed w/ wife and RN's  at bedside   - Not clear cause for sinus tachy possibly pain , hypotension  BB held rebound  , low volume. Hb has been stable.   - EF 55%  TERRI 24 restart BB     - Dialysis as scheduled         Hold Diltiazem and  Dig.   - Plavix restarted.  ASA on hold   - Discharge planning for [place]   - Home dose Amiodarone 200 qD         Subjective:     Resting w/ BiPAP. 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)    Atherosclerosis of native arteries of extremities with intermittent claudication, bilateral legs (HCC)    Primary hypertension    Smokers' cough (HCC)    Unstable angina (HCC)    Lower GI bleed    ESRD (end stage renal disease) on dialysis (HCC)    PAF (paroxysmal atrial fibrillation) (HCC)    AVM (arteriovenous malformation) of colon    ABLA (acute blood loss anemia)    Rectal bleeding    Anticoagulated    Antiplatelet or antithrombotic long-term use    Lower GI bleeding    ESRD on hemodialysis (HCC)    GI bleed    Closed displaced midcervical fracture of left femur with delayed healing    ESRD (end stage renal disease) (HCC)    Closed fracture of left hip (HCC)    Hyperphosphatemia    Respiratory failure (HCC)    Anemia    Palliative care by specialist       Objective:   Temp: 99 °F (37.2 °C)  Pulse: 93  Resp: 18  BP: 130/47    Intake/Output:     Intake/Output Summary (Last 24 hours) at 10/22/2024 0653  Last data filed at 10/22/2024 0430  Gross per 24 hour   Intake 1415 ml   Output 1500 ml   Net -85 ml         Last 3 Weights   10/22/24 0500 132 lb 14.4 oz (60.3 kg)   10/21/24 0449 137 lb 4.8 oz (62.3 kg)   10/20/24 0649 134 lb 3.2 oz (60.9 kg)   10/18/24 0441 131 lb 1.6 oz (59.5 kg)   10/05/24 0600 144 lb 10 oz (65.6 kg)   10/04/24 0600 145 lb 8.1 oz (66 kg)   10/02/24 1310 156 lb 4.9 oz (70.9 kg)   10/02/24 0800 143 lb 11.8 oz (65.2 kg)   10/01/24 0600 160 lb 0.9 oz (72.6 kg)   09/30/24 0400 164 lb 0.4 oz (74.4 kg)   09/28/24 1649 155 lb 11.2 oz (70.6 kg)   08/22/24 1311 159 lb 12.8 oz (72.5 kg)   08/20/24 0933 158 lb (71.7 kg)       Tele:    A Flutter > NSR     Physical Exam:    General: Awake   HEENT: Normocephalic, anicteric sclera NG tube out   Neck: supple  Cardiac: Regular rhythm. S1, S2 normal. No murmur, pericardial rub, S3, thrill, heave or extra cardiac sounds.  Lungs: Coarse breath sounds bilat  Abdomen: Soft,  non-distended G-Tube   Extremities: Without clubbing, cyanosis or edema.  SCD boots Missing digits R hand   Neurologic: Alert  Skin: Warm and dry.     Laboratory/Data:    Labs:         Recent Labs   Lab 10/17/24  0412 10/18/24  0633 10/19/24  0723 10/20/24  0448 10/21/24  0656   WBC 14.6* 17.0* 12.8* 13.3* 14.9*   HGB 9.8* 9.5* 8.0* 8.5* 7.9*   .6* 104.7* 96.9 95.1 97.6   .0 255.0 235.0 243.0 220.0       Recent Labs   Lab 10/16/24  0421 10/17/24  0412 10/18/24  0849 10/19/24  0723 10/20/24  0448 10/21/24  0656    138 133* 134* 133* 133*   K 4.7 4.6 4.2 3.6 3.6 3.8    100 100 98 96* 93*   CO2 25.0 26.0 19.0* 27.0 26.0 27.0   BUN 97* 57* 72* 49* 59* 72*   CREATSERUM 8.70* 6.37* 7.66* 5.18* 6.45* 7.56*   CA 10.1 9.9 9.4 9.2 9.1 9.2   MG 2.4 2.2 2.1 1.8 1.8  --    PHOS 7.6* 7.4* 6.4*  --  5.3*  --    * 177* 203* 133* 202* 206*       Recent Labs   Lab 10/17/24  0412   ALT 19   AST 39*   ALB 3.8       No results for input(s): \"TROP\" in the last 168 hours.    Allergies:   Allergies   Allergen Reactions    Adhesive Tape OTHER (SEE COMMENTS)     Severe rashes    Dust Mite Extract RASH       Medications:  Current Facility-Administered Medications   Medication Dose Route Frequency    [START ON 10/23/2024] sodium chloride 0.9 % IV bolus 100 mL  100 mL Intravenous Q30 Min PRN    And    [START ON 10/23/2024] albumin human (Albumin) 25% injection 25 g  25 g Intravenous PRN Dialysis    [START ON 10/23/2024] heparin (Porcine) 1000 UNIT/ML injection 1,500 Units  1.5 mL Intracatheter Once    amiodarone (Pacerone) tab 200 mg  200 mg Per G Tube BID with meals    sennosides (Senokot) tab 8.6 mg  8.6 mg Per G Tube BID    sodium ferric gluconate (Ferrlecit) 125 mg in sodium chloride 0.9% 100mL IVPB premix  125 mg Intravenous Daily    dextrose 10% infusion (TPN no rate)   Intravenous Continuous PRN    pancrelipase (Lip-Prot-Amyl) (Zenpep) DR particles cap 10,000 Units  10,000 Units Per G Tube PRN    And     sodium bicarbonate tab 325 mg  325 mg Per G Tube PRN    atorvastatin (Lipitor) tab 40 mg  40 mg Per G Tube Nightly    clopidogrel (Plavix) tab 75 mg  75 mg Per G Tube Daily    lansoprazole (Prevacid Solutab) disintegrating tab 30 mg  30 mg Per G Tube QAM AC    HYDROcodone-acetaminophen (Norco) 5-325 MG per tab 1 tablet  1 tablet Per G Tube Q6H PRN    acetaminophen (Tylenol) 160 MG/5ML oral liquid 650 mg  650 mg Per G Tube Q4H PRN    carvedilol (Coreg) tab 25 mg  25 mg Per G Tube BID    insulin regular human (Novolin R, Humulin R) 100 UNIT/ML injection 1-7 Units  1-7 Units Subcutaneous 4 times per day    heparin (Porcine) 1000 UNIT/ML injection 1,500 Units  1.5 mL Intravenous PRN Dialysis    lidocaine-prilocaine (Emla) 2.5-2.5 % cream   Topical PRN    dextrose 10% infusion (TPN no rate)   Intravenous Continuous PRN    ipratropium-albuterol (Duoneb) 0.5-2.5 (3) MG/3ML inhalation solution 3 mL  3 mL Nebulization 2 times daily    ipratropium-albuterol (Duoneb) 0.5-2.5 (3) MG/3ML inhalation solution 3 mL  3 mL Nebulization Q6H PRN    influenza virus trivalent high dose PF (Fluzone HD) 0.5 mL injection (ages >/= 65 years) 0.5 mL  0.5 mL Intramuscular Prior to discharge    sevelamer carbonate (Renvela) tab 800 mg  800 mg Oral TID CC    [Held by provider] insulin aspart (NovoLOG) 100 Units/mL FlexPen 1-7 Units  1-7 Units Subcutaneous TID CC and HS    HYDROmorphone (Dilaudid) 1 MG/ML injection 0.1 mg  0.1 mg Intravenous Q2H PRN    Or    HYDROmorphone (Dilaudid) 1 MG/ML injection 0.2 mg  0.2 mg Intravenous Q2H PRN    Or    HYDROmorphone (Dilaudid) 1 MG/ML injection 0.4 mg  0.4 mg Intravenous Q2H PRN    epoetin donna (Epogen, Procrit) 5,000 Units injection  5,000 Units Subcutaneous Once per day on Monday Wednesday Friday    metoclopramide (Reglan) 5 mg/mL injection 10 mg  10 mg Intravenous Q24H PRN    diphenhydrAMINE (Benadryl) cap/tab 25 mg  25 mg Oral Q4H PRN    Or    diphenhydrAMINE (Benadryl) 50 mg/mL  injection 12.5 mg   12.5 mg Intravenous Q4H PRN    heparin (Porcine) 5000 UNIT/ML injection 5,000 Units  5,000 Units Subcutaneous Q8H [facility]    mineral oil-white petrolatum (Artificial Tears) 83-15 % ophthalmic ointment 1 Application  1 Application Both Eyes Nightly    lidocaine-menthol 4-1 % patch 2 patch  2 patch Transdermal Daily    hydrALAzine (Apresoline) 20 mg/mL injection 10 mg  10 mg Intravenous Q6H PRN    labetalol (Trandate) 5 mg/mL injection 10 mg  10 mg Intravenous Q4H PRN    polyethylene glycol (PEG 3350) (Miralax) 17 g oral packet 17 g  17 g Per NG Tube Daily PRN    bisacodyl (Dulcolax) 10 MG rectal suppository 10 mg  10 mg Rectal Daily PRN    albuterol (Ventolin HFA) 108 (90 Base) MCG/ACT inhaler 2 puff  2 puff Inhalation Q4H PRN    fluticasone propionate (Flonase) 50 MCG/ACT nasal suspension 2 spray  2 spray Nasal Daily PRN    glucose (Dex4) 15 GM/59ML oral liquid 15 g  15 g Oral Q15 Min PRN    Or    glucose (Glutose) 40% oral gel 15 g  15 g Oral Q15 Min PRN    Or    glucose-vitamin C (Dex-4) chewable tab 4 tablet  4 tablet Oral Q15 Min PRN    Or    dextrose 50% injection 50 mL  50 mL Intravenous Q15 Min PRN    Or    glucose (Dex4) 15 GM/59ML oral liquid 30 g  30 g Oral Q15 Min PRN    Or    glucose (Glutose) 40% oral gel 30 g  30 g Oral Q15 Min PRN    Or    glucose-vitamin C (Dex-4) chewable tab 8 tablet  8 tablet Oral Q15 Min PRN    fluticasone-salmeterol (Advair Diskus) 250-50 MCG/ACT inhaler 1 puff  1 puff Inhalation BID

## 2024-10-23 ENCOUNTER — TELEPHONE (OUTPATIENT)
Dept: INTERNAL MEDICINE UNIT | Facility: HOSPITAL | Age: 79
End: 2024-10-23

## 2024-10-23 NOTE — PAYOR COMM NOTE
Discharge Notification    Patient Name: MELISSA ISRAEL  Payor: [employer]  Subscriber #: [id number]  Authorization Number: [id number]  Admit Date/Time: 9/28/2024 12:00 PM  Discharge Date/Time: 10/22/2024 5:45 PM

## 2024-10-23 NOTE — TELEPHONE ENCOUNTER
I received a fax from [facility] stating that it would be cheaper for the prescription Lansoprazole 30mg oral tablets to be administered as capsule formulation.  I spoke to Dr. Michel to confirm that it would be okay to change the RX to capsules.  She stated that would be fine.  I called the pharmacy back and relayed the message.  No further questions or concerns.

## 2024-10-28 ENCOUNTER — PATIENT MESSAGE (OUTPATIENT)
Dept: INTERNAL MEDICINE CLINIC | Facility: CLINIC | Age: 79
End: 2024-10-28

## 2024-11-08 ENCOUNTER — TELEPHONE (OUTPATIENT)
Dept: ENDOCRINOLOGY CLINIC | Facility: CLINIC | Age: 79
End: 2024-11-08

## 2024-11-08 NOTE — TELEPHONE ENCOUNTER
Received fax from [facility] requesting recent office notes. Faxed notes from 8/20/24. Received confirmation

## 2024-11-08 NOTE — PROGRESS NOTES
The patient's wife called back and is declining the program at the moment but would like to obtain more information about it.    CCM sending info requested.

## 2024-11-26 ENCOUNTER — PATIENT OUTREACH (OUTPATIENT)
Dept: CASE MANAGEMENT | Age: 79
End: 2024-11-26

## 2024-11-26 ENCOUNTER — OFFICE VISIT (OUTPATIENT)
Dept: ORTHOPEDICS CLINIC | Facility: CLINIC | Age: 79
End: 2024-11-26
Payer: COMMERCIAL

## 2024-11-26 ENCOUNTER — HOSPITAL ENCOUNTER (OUTPATIENT)
Dept: GENERAL RADIOLOGY | Facility: HOSPITAL | Age: 79
Discharge: HOME | End: 2024-11-26
Attending: ORTHOPAEDIC SURGERY
Payer: MEDICARE

## 2024-11-26 DIAGNOSIS — S72.002G CLOSED FRACTURE OF LEFT HIP WITH DELAYED HEALING, SUBSEQUENT ENCOUNTER: ICD-10-CM

## 2024-11-26 DIAGNOSIS — Z47.89 ORTHOPEDIC AFTERCARE: ICD-10-CM

## 2024-11-26 DIAGNOSIS — S72.002G: Primary | ICD-10-CM

## 2024-11-26 DIAGNOSIS — M80.052G PATHOLOGICAL FRACTURE OF LEFT HIP DUE TO AGE-RELATED OSTEOPOROSIS WITH DELAYED HEALING, SUBSEQUENT ENCOUNTER: ICD-10-CM

## 2024-11-26 PROBLEM — D69.6 THROMBOCYTOPENIA (HCC): Chronic | Status: ACTIVE | Noted: 2024-11-26

## 2024-11-26 PROBLEM — D69.6 THROMBOCYTOPENIA: Chronic | Status: ACTIVE | Noted: 2024-11-26

## 2024-11-26 PROBLEM — D69.6 THROMBOCYTOPENIA: Chronic | Status: ACTIVE | Noted: 2024-01-01

## 2024-11-26 PROCEDURE — 1125F AMNT PAIN NOTED PAIN PRSNT: CPT

## 2024-11-26 PROCEDURE — 99024 POSTOP FOLLOW-UP VISIT: CPT

## 2024-11-26 PROCEDURE — 73502 X-RAY EXAM HIP UNI 2-3 VIEWS: CPT | Performed by: ORTHOPAEDIC SURGERY

## 2024-11-26 PROCEDURE — 1159F MED LIST DOCD IN RCRD: CPT

## 2024-11-26 PROCEDURE — 1160F RVW MEDS BY RX/DR IN RCRD: CPT

## 2024-11-26 NOTE — PROGRESS NOTES
NURSING INTAKE COMMENTS:   Chief Complaint   Patient presents with    Post-Op     Anterior left hip replacement 10/4/24- pain continues in left hip, rates pain 3/10       HPI: This 79 year old male presents today with several caretakers approximately 6 weeks status post left anterior total hip arthroplasty after obtaining a displaced left femoral neck fracture with delayed healing.  Overall patient reports he is doing okay.  After being discharged from the hospital he went to an acute rehab center.  He feels like he has not been getting adequate physical therapy, and feels weak.  He denies any recent fever or chills.  Denies any new numbness or tingling, had previous numbness and tingling in his bilateral hands and feet prior to surgery.  He denies any numbness or tingling surrounding his hip incision.  Reports intermittent pain, today is the day for pain.  He is in a wheelchair today, reports that he cannot stand on his own without assistance.    Past Medical History:    Anemia    Asthma (HCC)    Back problem    BPH (benign prostatic hyperplasia)    Calculus of kidney    Cataract    Coronary atherosclerosis    Diabetes (HCC)    ESRD (end stage renal disease) on dialysis ([facility])    Essential hypertension    High blood pressure    High cholesterol    History of blood transfusion    Hyperlipidemia    Neuropathy    hands and feet    KRAIG on CPAP    Renal disorder    Sleep apnea    Visual impairment    glasses    Vocal cord paralysis, unilateral partial     Past Surgical History:   Procedure Laterality Date    Appendectomy      1981    Back surgery      Neck/back - 1998    Capsule endoscopy - internal referral      Cataract extraction Right 01/08/2022    PHACOEMULSIFICATION WITH POSTERIOR CHAMBER INTRAOCULAR LENS, RIGHT EYE    Cataract extraction Left 12/21/2021    PHACOEMULSIFICATION WITH POSTERIOR CHAMBER INTRAOCULAR LENS, LEFT EYE    Cath bare metal stent (bms)      Cath drug eluting stent  05/21/2024    Successful IVUS  guided PCI of the circumflex with a ABIMBOLA    Colonoscopy      Colonoscopy N/A 01/25/2021    Procedure: COLONOSCOPY;  Surgeon: Michael Gautam MD;  Location: [facility] ENDO    Colonoscopy N/A 06/03/2024    Procedure: COLONOSCOPY;  Surgeon: Gabriel Saldana MD;  Location: [facility] ENDOSCOPY    Colonoscopy N/A 06/15/2024    Procedure: COLONOSCOPY;  Surgeon: Carlyn Storey MD;  Location: [facility] ENDOSCOPY    Colonoscopy N/A 07/31/2024    Procedure: COLONOSCOPY;  Surgeon: Gabriel Saldana MD;  Location: [facility] ENDOSCOPY    Dialysis procedure  02/17/2023    right internal jugular tunneled dialysis catheter placement.    Hand/finger surgery unlisted      Accidental trauma    Spine surgery procedure unlisted      Total hip arthroplasty Left 10/04/2024    Left anterior total hip arthroplasty    Upper gi endoscopy,diagnosis       Current Outpatient Medications   Medication Sig Dispense Refill    lansoprazole 30 MG Oral Tablet Delayed Release Dispersible 1 tablet (30 mg total) by Per G Tube route every morning before breakfast. 30 tablet 0    amiodarone 200 MG Oral Tab 1 tablet (200 mg total) by Per G Tube route daily. 30 tablet 0    HYDROcodone-acetaminophen 5-325 MG Oral Tab 1 tablet by Per G Tube route every 6 (six) hours as needed. 20 tablet 0    clopidogrel 75 MG Oral Tab Take 1 tablet (75 mg total) by mouth daily.      linaGLIPtin (TRADJENTA) 5 mg Oral Tab Take 1 tablet (5 mg total) by mouth daily. 90 tablet 1    atorvastatin 40 MG Oral Tab Take 1 tablet (40 mg total) by mouth nightly. 90 tablet 1    felodipine ER 10 MG Oral Tablet 24 Hr Take 1 tablet (10 mg total) by mouth daily. 90 tablet 3    aspirin 81 MG Oral Tab EC Take 1 tablet (81 mg total) by mouth daily. 90 tablet 3    carvedilol 25 MG Oral Tab Take 1 tablet (25 mg total) by mouth 2 (two) times daily.      acetaminophen 500 MG Oral Tab Take 1 tablet (500 mg total) by mouth daily as needed for Pain.      cetirizine 10 MG Oral Tab Take 1 tablet (10 mg total) by mouth every other  day.      Cholecalciferol 125 MCG (5000 UT) Oral Tab Take 1 tablet (5,000 Units total) by mouth 2 (two) times daily.      polyethylene glycol, PEG 3350, 17 g Oral Powd Pack daily as needed.      fluticasone propionate 50 MCG/ACT Nasal Suspension 2 sprays by Nasal route daily. 48 g 3    albuterol (PROAIR HFA) 108 (90 Base) MCG/ACT Inhalation Aero Soln Inhale 2 puffs into the lungs every 4 (four) hours as needed for Wheezing. 3 each 3    Budesonide-Formoterol Fumarate (SYMBICORT) 160-4.5 MCG/ACT Inhalation Aerosol Inhale 2 puffs into the lungs 2 (two) times daily. 3 each 3     Allergies[1]  Family History   Problem Relation Age of Onset    Cancer Father         Kidney    Breast Cancer Mother     Diabetes Mother     Diabetes Maternal Grandmother     Diabetes Maternal Grandfather     Heart Disorder Other         Uncle     No family Hx of DVT/PE    Social History     Occupational History    Not on file   Tobacco Use    Smoking status: Former     Current packs/day: 0.00     Average packs/day: 1 pack/day for 17.0 years (17.0 ttl pk-yrs)     Types: Cigarettes     Start date: 1964     Quit date: 1981     Years since quittin.9    Smokeless tobacco: Never   Vaping Use    Vaping status: Never Used   Substance and Sexual Activity    Alcohol use: No     Alcohol/week: 0.0 standard drinks of alcohol    Drug use: No    Sexual activity: Yes     Partners: Female        Review of Systems:  GENERAL: feels generally well, no significant weight loss or weight gain  SKIN: no ulcerated or worrisome skin lesions  EYES:denies blurred vision or double vision  HEENT: denies new nasal congestion, sinus pain or ST  LUNGS: denies shortness of breath  CARDIOVASCULAR: denies chest pain  GI: no hematemesis, no worsening heartburn, no diarrhea  : no dysuria, no blood in urine, no difficulty urinating, no incontinence  MUSCULOSKELETAL: no other musculoskeletal complaints other than in HPI  NEURO: no numbness or tingling, no weakness or  balance disorder  PSYCHE: no depression or anxiety  HEMATOLOGIC: no hx of blood dyscrasia, no Hx DVT/PE  ENDOCRINE: no thyroid or diabetes issues  ALL/ASTHMA: no new hx of severe allergy or asthma    Physical Examination:    There were no vitals taken for this visit.  Constitutional: appears well hydrated, alert and responsive, no acute distress noted  Extremities: Lower extremity skin healthy, no pitting edema.  Bilateral calf soft nontender.  Incision healing well.  No asymmetric warmth or swelling to left hip.  Musculoskeletal: Leg lengths equal.  Able to do bilateral straight leg raise against gentle resistance.  Able to dorsiflex and plantarflex against gentle resistance.  Gentle passive internal/external rotation of left hip did not cause any groin pain today.  Neurological: Motor and sensory function intact, previous neuropathy throughout bilateral hands and lower extremities.  Denies any new numbness or tingling since surgery.    Imaging: X-rays taken today in office show left hip implants are well-fixed and in proper alignment.      No results found.     Lab Results   Component Value Date    WBC 15.2 (H) 10/22/2024    HGB 7.7 (L) 10/22/2024    .0 10/22/2024      Lab Results   Component Value Date     (H) 10/22/2024    BUN 49 (H) 10/22/2024    CREATSERUM 5.22 (H) 10/22/2024    GFRNAA 17 (L) 07/06/2022    GFRAA 20 (L) 07/06/2022        Assessment and Plan:  Diagnoses and all orders for this visit:    Closed displaced fracture of neck of left femur with delayed healing  -     Physical Therapy Referral - [facility]    Orthopedic aftercare  -     XR HIP W OR WO PELVIS 2 OR 3 VIEWS, LEFT (CPT=73502) [[id number]] - Orthopedic providers only; Future  -     Physical Therapy Referral - [facility]    Closed fracture of left hip with delayed healing, subsequent encounter  -     Physical Therapy Referral - [facility]    Pathological fracture of left hip due to age-related osteoporosis  with delayed healing, subsequent encounter  -     Physical Therapy Referral - [facility]        Assessment: As above    Plan: With the patient and the caretakers at significance extremely important that he begins working on physical therapy.  From an orthopedic standpoint he is able to weight-bear as tolerated as well as ambulate as tolerated.  We gave him a prescription for outpatient physical therapy today.    Discussed he should not take any calcium supplements due to his renal function.    We will see him in 6 weeks for repeat x-rays and reevaluation.    Follow Up: No follow-ups on file.    SOFÍA Delgado       [1]   Allergies  Allergen Reactions    Adhesive Tape OTHER (SEE COMMENTS)     Severe rashes    Dust Mite Extract RASH

## 2024-11-27 ENCOUNTER — TELEPHONE (OUTPATIENT)
Dept: INTERNAL MEDICINE CLINIC | Facility: CLINIC | Age: 79
End: 2024-11-27

## 2024-11-27 NOTE — TELEPHONE ENCOUNTER
Will schedule posthospital follow-up for within 1-2 weeks    Thursday 12/5 - 17  OK to double book: 11:30 am  OK to double book 3:30 pm  Friday 12/6 - 11  OK to double book: 10:30 am     Monday 12/9 - 14  OPEN: 12:30 pm  OK to Double Book: 1:30 pm, 3 pm  Tuesday 12/10 - 16  CP: 2:30 pm  OK to double book: 9:30 am  Please let me know what he selects

## 2024-11-27 NOTE — TELEPHONE ENCOUNTER
The patient was just discharged from rehab/skilled nursing facility and needs an appt with PCP.    Please call Stephy to schedule, the patient goes to dialysis M/W/F

## 2024-11-29 ENCOUNTER — PATIENT OUTREACH (OUTPATIENT)
Dept: CASE MANAGEMENT | Age: 79
End: 2024-11-29

## 2024-11-29 DIAGNOSIS — I50.32 CHRONIC DIASTOLIC CONGESTIVE HEART FAILURE (HCC): ICD-10-CM

## 2024-11-29 DIAGNOSIS — N18.6 ESRD (END STAGE RENAL DISEASE) ON DIALYSIS (HCC): ICD-10-CM

## 2024-11-29 DIAGNOSIS — Z79.4 TYPE 2 DIABETES MELLITUS WITH CHRONIC KIDNEY DISEASE ON CHRONIC DIALYSIS, WITH LONG-TERM CURRENT USE OF INSULIN (HCC): Primary | ICD-10-CM

## 2024-11-29 DIAGNOSIS — N18.4 HYPERTENSIVE HEART AND KIDNEY DISEASE WITH CHRONIC DIASTOLIC CONGESTIVE HEART FAILURE AND STAGE 4 CHRONIC KIDNEY DISEASE (HCC): ICD-10-CM

## 2024-11-29 DIAGNOSIS — N18.6 TYPE 2 DIABETES MELLITUS WITH CHRONIC KIDNEY DISEASE ON CHRONIC DIALYSIS, WITH LONG-TERM CURRENT USE OF INSULIN (HCC): Primary | ICD-10-CM

## 2024-11-29 DIAGNOSIS — I48.0 PAF (PAROXYSMAL ATRIAL FIBRILLATION) (HCC): ICD-10-CM

## 2024-11-29 DIAGNOSIS — E78.2 MIXED HYPERLIPIDEMIA: ICD-10-CM

## 2024-11-29 DIAGNOSIS — N18.5 ANEMIA OF CHRONIC RENAL FAILURE, STAGE 5 (HCC): ICD-10-CM

## 2024-11-29 DIAGNOSIS — E11.22 TYPE 2 DIABETES MELLITUS WITH CHRONIC KIDNEY DISEASE ON CHRONIC DIALYSIS, WITH LONG-TERM CURRENT USE OF INSULIN (HCC): Primary | ICD-10-CM

## 2024-11-29 DIAGNOSIS — S72.002G CLOSED FRACTURE OF LEFT HIP WITH DELAYED HEALING, SUBSEQUENT ENCOUNTER: ICD-10-CM

## 2024-11-29 DIAGNOSIS — D63.1 ANEMIA OF CHRONIC RENAL FAILURE, STAGE 5 (HCC): ICD-10-CM

## 2024-11-29 DIAGNOSIS — I50.32 HYPERTENSIVE HEART AND KIDNEY DISEASE WITH CHRONIC DIASTOLIC CONGESTIVE HEART FAILURE AND STAGE 4 CHRONIC KIDNEY DISEASE (HCC): ICD-10-CM

## 2024-11-29 DIAGNOSIS — Z99.2 TYPE 2 DIABETES MELLITUS WITH CHRONIC KIDNEY DISEASE ON CHRONIC DIALYSIS, WITH LONG-TERM CURRENT USE OF INSULIN (HCC): Primary | ICD-10-CM

## 2024-11-29 DIAGNOSIS — I10 PRIMARY HYPERTENSION: ICD-10-CM

## 2024-11-29 DIAGNOSIS — J96.00 ACUTE RESPIRATORY FAILURE, UNSPECIFIED WHETHER WITH HYPOXIA OR HYPERCAPNIA (HCC): ICD-10-CM

## 2024-11-29 DIAGNOSIS — Z99.2 ESRD (END STAGE RENAL DISEASE) ON DIALYSIS (HCC): ICD-10-CM

## 2024-11-29 DIAGNOSIS — I13.0 HYPERTENSIVE HEART AND KIDNEY DISEASE WITH CHRONIC DIASTOLIC CONGESTIVE HEART FAILURE AND STAGE 4 CHRONIC KIDNEY DISEASE (HCC): ICD-10-CM

## 2024-11-29 RX ORDER — ALBUTEROL SULFATE 0.83 MG/ML
2.5 SOLUTION RESPIRATORY (INHALATION) 2 TIMES DAILY
COMMUNITY

## 2024-11-29 RX ORDER — FOLIC ACID/VIT B COMPLEX AND C 0.8 MG
TABLET ORAL
COMMUNITY

## 2024-11-29 RX ORDER — AMOXICILLIN AND CLAVULANATE POTASSIUM 250; 125 MG/1; MG/1
250 TABLET, FILM COATED ORAL
COMMUNITY
Start: 2024-11-27

## 2024-11-29 RX ORDER — LIDOCAINE 4 G/G
1 PATCH TOPICAL EVERY 24 HOURS
COMMUNITY

## 2024-11-29 NOTE — PROGRESS NOTES
Spoke to Ollie at length about Chronic Care Management, current care plan and performed CCM call with Ollie reviewed meds and compliance.     Interventions for following categories based on Chronic Care Management  initial call.      I want to know a little about you.  What do you do in your spare time? The patient likes to watch TV, he also enjoys reading the bible but has not been able to perform this activities due to recent health complications.  Are you retired or what do you do for a living? The patient is retired and worked performing different types of work.    Social support:  Do you live with someone? The patient lives with his wife, who is his primary caregiver, his daughter is temporarily living with them.  Do you have someone you can turn to when you are very stressed and or need help? Yes  Who? The patient's has his family but primary is his wife  Do you have someone you check in with or talk to on a regular basis? Yes  Are you responsible for anyone's care? No, the patient is not the primary care giver to anyone  How are you doing with that? N/A  Do you feel you take time for yourself too? N/A   Do you have any pets at home? No they don't own any pets     Let's talk about stress.  How much stress do you feel you have in your life? The patient is experiencing a huge amount of stress, up to 95%.   What stress's you out? His recent health complications  How do you manage this stress? In transition of accepting all of his conditions.    Nutrition: Let's talk about eating habits  Any special diet you are put on? The patient is getting primarily fed via G tube and his foods have to be in a liquid form.  Do you have any barriers to keeping with this diet? None  Do you eat three small meals a day? Yes  Do you eat a variety of fruits and veggies? Not at the moment  How do you stay hydrated? The patient is restricted on the amount of fluids he can take.      Exercise   Do you exercise? What do you do? The  patient is awaiting to get home physical there[y and occupational therapy as well.  How often? Unknown   How long? Undetermined  If not, are you up and moving in and around your home? The patient needs the assistance from other to get up from bed, he can only walk a few steps and has to using a walker or others to help him walk.    Medication review:  Update medications with meds from other providers as well, taking Amoxicillin for a lymph swollen  Is there any reason you are unable to take your medications as prescribed? Taking as prescribed  Do you take them every day on time? Yes   Reasons why you don't? N/A  Do you have any questions about your medications? Side effects? Etc.? The patient is question    Meds: Detailed medication review with Ollie. Discussed with Ollie if any potential barriers are present that could prevent compliance with medication regimen. At this time, no barriers reported. Ollie reports is stable on all medications and denies experiencing any side effects.    Care Team:  Review specialists and add to care team.    Disease specific:   Do you feel you have a good understanding of your chinic conditions? To a certain extent yes he does.    Do you have chronic pain: Yes, daily.    Follow up:  You can call me anytime you have a question or need help with achieving your goals.  I will follow up with you in a few weeks to see how you are doing and if we need to change anything to help you meet your needs.  Remember: what works for one person may not always work for another. We will continue to work on what will help you meet your needs.    Care Plan: Reviewed and updated where needed.    The patient needs a TCM to be done by PCP, they received a call but were unable to answer and when they tried calling back the office was already closed, they will try again Monday.    The patient is in need to make a request to PCP for diapers, nebulizer machine and will ask about blood pressure  medication.  Health  Follow-up: in a month or sooner if needed  Time Spent This Encounter Total:  70 min medical record review, telephone communication, care plan updates where needed, and education. Provided acknowledgment and validation to patient's concerns.   Monthly Minute Total including today: 70 mins    Physical assessment, complete health history, and need for Chronic Care Management established by Wili Parmar MD.      Friendly reminder - 2025 Benefits Open Enrollment is here!   We encourage you to review your current Medicare coverage and explore your options during this period. We have developed a Medicare Information Page on our website to serve as a resource for guidance and answers to any questions you might have.   You can access it by visiting, [email or web address]

## 2024-11-30 ENCOUNTER — HOSPITAL ENCOUNTER (EMERGENCY)
Facility: HOSPITAL | Age: 79
Discharge: HOME | End: 2024-11-30
Attending: EMERGENCY MEDICINE
Payer: MEDICARE

## 2024-11-30 ENCOUNTER — APPOINTMENT (OUTPATIENT)
Dept: GENERAL RADIOLOGY | Facility: HOSPITAL | Age: 79
End: 2024-11-30
Attending: EMERGENCY MEDICINE
Payer: MEDICARE

## 2024-11-30 ENCOUNTER — PATIENT MESSAGE (OUTPATIENT)
Dept: INTERNAL MEDICINE CLINIC | Facility: CLINIC | Age: 79
End: 2024-11-30

## 2024-11-30 VITALS
RESPIRATION RATE: 18 BRPM | SYSTOLIC BLOOD PRESSURE: 118 MMHG | HEIGHT: 64 IN | DIASTOLIC BLOOD PRESSURE: 35 MMHG | OXYGEN SATURATION: 97 % | HEART RATE: 67 BPM | BODY MASS INDEX: 24.75 KG/M2 | WEIGHT: 145 LBS | TEMPERATURE: 99 F

## 2024-11-30 DIAGNOSIS — M79.2 NERVE PAIN: Primary | ICD-10-CM

## 2024-11-30 DIAGNOSIS — N18.6 ESRD ON HEMODIALYSIS (HCC): ICD-10-CM

## 2024-11-30 DIAGNOSIS — Z99.2 ESRD ON HEMODIALYSIS (HCC): ICD-10-CM

## 2024-11-30 LAB
ANION GAP SERPL CALC-SCNC: 7 MMOL/L (ref 0–18)
BASOPHILS # BLD AUTO: 0.06 X10(3) UL (ref 0–0.2)
BASOPHILS NFR BLD AUTO: 0.4 %
BUN BLD-MCNC: 19 MG/DL (ref 9–23)
BUN/CREAT SERPL: 7.1 (ref 10–20)
CALCIUM BLD-MCNC: 9.4 MG/DL (ref 8.7–10.4)
CHLORIDE SERPL-SCNC: 96 MMOL/L (ref 98–112)
CO2 SERPL-SCNC: 35 MMOL/L (ref 21–32)
CREAT BLD-MCNC: 2.69 MG/DL
DEPRECATED RDW RBC AUTO: 55.3 FL (ref 35.1–46.3)
EGFRCR SERPLBLD CKD-EPI 2021: 24 ML/MIN/1.73M2 (ref 60–?)
EOSINOPHIL # BLD AUTO: 0.28 X10(3) UL (ref 0–0.7)
EOSINOPHIL NFR BLD AUTO: 2 %
ERYTHROCYTE [DISTWIDTH] IN BLOOD BY AUTOMATED COUNT: 16.2 % (ref 11–15)
FLUAV + FLUBV RNA SPEC NAA+PROBE: NEGATIVE
FLUAV + FLUBV RNA SPEC NAA+PROBE: NEGATIVE
GLUCOSE BLD-MCNC: 156 MG/DL (ref 70–99)
HCT VFR BLD AUTO: 23.2 %
HGB BLD-MCNC: 7.5 G/DL
IMM GRANULOCYTES # BLD AUTO: 0.09 X10(3) UL (ref 0–1)
IMM GRANULOCYTES NFR BLD: 0.7 %
LYMPHOCYTES # BLD AUTO: 1.71 X10(3) UL (ref 1–4)
LYMPHOCYTES NFR BLD AUTO: 12.4 %
MCH RBC QN AUTO: 30.1 PG (ref 26–34)
MCHC RBC AUTO-ENTMCNC: 32.3 G/DL (ref 31–37)
MCV RBC AUTO: 93.2 FL
MONOCYTES # BLD AUTO: 1.02 X10(3) UL (ref 0.1–1)
MONOCYTES NFR BLD AUTO: 7.4 %
NEUTROPHILS # BLD AUTO: 10.62 X10 (3) UL (ref 1.5–7.7)
NEUTROPHILS # BLD AUTO: 10.62 X10(3) UL (ref 1.5–7.7)
NEUTROPHILS NFR BLD AUTO: 77.1 %
OSMOLALITY SERPL CALC.SUM OF ELEC: 291 MOSM/KG (ref 275–295)
PLATELET # BLD AUTO: 225 10(3)UL (ref 150–450)
POTASSIUM SERPL-SCNC: 3.4 MMOL/L (ref 3.5–5.1)
RBC # BLD AUTO: 2.49 X10(6)UL
RSV RNA SPEC NAA+PROBE: NEGATIVE
SARS-COV-2 RNA RESP QL NAA+PROBE: NOT DETECTED
SODIUM SERPL-SCNC: 138 MMOL/L (ref 136–145)
WBC # BLD AUTO: 13.8 X10(3) UL (ref 4–11)

## 2024-11-30 PROCEDURE — 99284 EMERGENCY DEPT VISIT MOD MDM: CPT

## 2024-11-30 PROCEDURE — 80048 BASIC METABOLIC PNL TOTAL CA: CPT | Performed by: EMERGENCY MEDICINE

## 2024-11-30 PROCEDURE — 71045 X-RAY EXAM CHEST 1 VIEW: CPT | Performed by: EMERGENCY MEDICINE

## 2024-11-30 PROCEDURE — 0241U SARS-COV-2/FLU A AND B/RSV BY PCR (GENEXPERT): CPT | Performed by: EMERGENCY MEDICINE

## 2024-11-30 PROCEDURE — 36415 COLL VENOUS BLD VENIPUNCTURE: CPT

## 2024-11-30 PROCEDURE — 85025 COMPLETE CBC W/AUTO DIFF WBC: CPT | Performed by: EMERGENCY MEDICINE

## 2024-11-30 RX ORDER — GABAPENTIN 250 MG/5ML
300 SOLUTION ORAL ONCE
Status: COMPLETED | OUTPATIENT
Start: 2024-11-30 | End: 2024-11-30

## 2024-11-30 RX ORDER — ACETAMINOPHEN 160 MG/5ML
500 SOLUTION ORAL ONCE
Status: COMPLETED | OUTPATIENT
Start: 2024-11-30 | End: 2024-11-30

## 2024-11-30 RX ORDER — GABAPENTIN 250 MG/5ML
SOLUTION ORAL
Qty: 450 ML | Refills: 0 | Status: SHIPPED | OUTPATIENT
Start: 2024-11-30 | End: 2024-12-03

## 2024-11-30 NOTE — ED PROVIDER NOTES
Patient Seen in: [facility] Emergency Department      History   No chief complaint on file.    Stated Complaint: Not feeling well    Subjective:   HPI      ***    Objective:     Past Medical History:    Anemia    Asthma (HCC)    Back problem    BPH (benign prostatic hyperplasia)    Calculus of kidney    Cataract    Coronary atherosclerosis    Diabetes (HCC)    ESRD (end stage renal disease) on dialysis (HCC)    Essential hypertension    High blood pressure    High cholesterol    History of blood transfusion    Hyperlipidemia    Neuropathy    hands and feet    KRAIG on CPAP    Renal disorder    Sleep apnea    Visual impairment    glasses    Vocal cord paralysis, unilateral partial              Past Surgical History:   Procedure Laterality Date    Appendectomy      1981    Back surgery      Neck/back - 1998    Capsule endoscopy - internal referral      Cataract extraction Right 01/08/2022    PHACOEMULSIFICATION WITH POSTERIOR CHAMBER INTRAOCULAR LENS, RIGHT EYE    Cataract extraction Left 12/21/2021    PHACOEMULSIFICATION WITH POSTERIOR CHAMBER INTRAOCULAR LENS, LEFT EYE    Cath bare metal stent (bms)      Cath drug eluting stent  05/21/2024    Successful IVUS guided PCI of the circumflex with a ABIMBOLA    Colonoscopy      Colonoscopy N/A 01/25/2021    Procedure: COLONOSCOPY;  Surgeon: Michael Gautam MD;  Location: [facility] ENDO    Colonoscopy N/A 06/03/2024    Procedure: COLONOSCOPY;  Surgeon: Gabriel Saldana MD;  Location: [facility] ENDOSCOPY    Colonoscopy N/A 06/15/2024    Procedure: COLONOSCOPY;  Surgeon: Carlyn Storey MD;  Location: [facility] ENDOSCOPY    Colonoscopy N/A 07/31/2024    Procedure: COLONOSCOPY;  Surgeon: Gabriel Saldana MD;  Location: [facility] ENDOSCOPY    Dialysis procedure  02/17/2023    right internal jugular tunneled dialysis catheter placement.    Hand/finger surgery unlisted      Accidental trauma    Spine surgery procedure unlisted      Total hip arthroplasty Left 10/04/2024    Left anterior total hip  arthroplasty    Upper gi endoscopy,diagnosis                  No pertinent social history.                Physical Exam     ED Triage Vitals [11/30/24 0118]   /35   Pulse 70   Resp 18   Temp 99.3 °F (37.4 °C)   Temp src Oral   SpO2 97 %   O2 Device None (Room air)       Current Vitals:   Vital Signs  BP: 117/35  Pulse: 70  Resp: 18  Temp: 99.3 °F (37.4 °C)  Temp src: Oral    Oxygen Therapy  SpO2: 97 %  O2 Device: None (Room air)        Physical Exam  ***      ED Course     Labs Reviewed   CBC WITH DIFFERENTIAL WITH PLATELET - Abnormal; Notable for the following components:       Result Value    WBC 13.8 (*)     RBC 2.49 (*)     HGB 7.5 (*)     HCT 23.2 (*)     RDW-SD 55.3 (*)     RDW 16.2 (*)     Neutrophil Absolute Prelim 10.62 (*)     Neutrophil Absolute 10.62 (*)     Monocyte Absolute 1.02 (*)     All other components within normal limits   BASIC METABOLIC PANEL (8) - Abnormal; Notable for the following components:    Glucose 156 (*)     Potassium 3.4 (*)     Chloride 96 (*)     CO2 35.0 (*)     Creatinine 2.69 (*)     BUN/CREA Ratio 7.1 (*)     eGFR-Cr 24 (*)     All other components within normal limits   URINALYSIS WITH CULTURE REFLEX   SARS-COV-2/FLU A AND B/RSV BY PCR (GENEXPERT)            ***       MDM      ***        MDM    Disposition and Plan     Clinical Impression:  No diagnosis found.     Disposition:  There is no disposition on file for this visit.  There is no disposition time on file for this visit.    Follow-up:  No follow-up provider specified.  We recommend that you schedule follow up care with a primary care provider within the next three months to obtain basic health screening including reassessment of your blood pressure.      Medications Prescribed:  Current Discharge Medication List              Supplementary Documentation:                                                            is warm.      Findings: No erythema or rash.   Neurological:      Mental Status: He is alert.      GCS: GCS eye subscore is 4. GCS verbal subscore is 1. GCS motor subscore is 5.      Motor: Weakness, atrophy and abnormal muscle tone present.           ED Course     Labs Reviewed   CBC WITH DIFFERENTIAL WITH PLATELET - Abnormal; Notable for the following components:       Result Value    WBC 13.8 (*)     RBC 2.49 (*)     HGB 7.5 (*)     HCT 23.2 (*)     RDW-SD 55.3 (*)     RDW 16.2 (*)     Neutrophil Absolute Prelim 10.62 (*)     Neutrophil Absolute 10.62 (*)     Monocyte Absolute 1.02 (*)     All other components within normal limits   BASIC METABOLIC PANEL (8) - Abnormal; Notable for the following components:    Glucose 156 (*)     Potassium 3.4 (*)     Chloride 96 (*)     CO2 35.0 (*)     Creatinine 2.69 (*)     BUN/CREA Ratio 7.1 (*)     eGFR-Cr 24 (*)     All other components within normal limits   SARS-COV-2/FLU A AND B/RSV BY PCR (GENEXPERT) - Normal    Narrative:     This test is intended for the qualitative detection and differentiation of SARS-CoV-2, influenza A, influenza B, and respiratory syncytial virus (RSV) viral RNA in nasopharyngeal or nares swabs from individuals suspected of respiratory viral infection consistent with COVID-19 by their healthcare provider. Signs and symptoms of respiratory viral infection due to SARS-CoV-2, influenza, and RSV can be similar.    Test performed using the Xpert Xpress SARS-CoV-2/FLU/RSV (real time RT-PCR)  assay on the GeneXpert instrument, [place], [place] [address].   This test is being used under the Food and Drug Administration's Emergency Use Authorization.    The authorized Fact Sheet for Healthcare Providers for this assay is available upon request from the laboratory.       ED Course as of 12/03/24 1411  ------------------------------------------------------------  Time: 11/30 0356  Value: XR CHEST AP PORTABLE  (CPT=71045)  Comment:         CHEST  RADIOGRAPH(S)    COMPARISON: 10/19/2024      IMPRESSION:  Other than some slightly lower lung volumes, stable exam.  No new acute cardiopulmonary process.              MDM      Pulse Ox: 97%, Normal, RA    Medications   acetaminophen (Tylenol) 160 MG/5ML oral liquid 500 mg (500 mg Per G Tube Given 11/30/24 0209)   gabapentin (Neurontin) 250 MG/5ML oral solution 300 mg (300 mg Per G Tube Given 11/30/24 0440)       Pt given tylenol and gabapentin, after is sleeping comfortably. No signs of pna on CXR, no resp distress. Pt advised to keep close f/u with his PCP, return for any worsening sx. Suspect ongoing intermittent pain all over is due in part to neuropathy.              Disposition and Plan     Clinical Impression:  1. Nerve pain    2. ESRD on hemodialysis (HCC)         Disposition:  Discharge  11/30/2024  4:28 am    Follow-up:  Wili Parmar MD  [address] [place] [id number]  [phone]    Schedule an appointment as soon as possible for a visit  Call for next available appointment    We recommend that you schedule follow up care with a primary care provider within the next three months to obtain basic health screening including reassessment of your blood pressure.      Medications Prescribed:  Discharge Medication List as of 11/30/2024  5:23 AM        START taking these medications    Details   Gabapentin 300 MG/6ML Oral Solution 300 mg by Peg Tube route daily for 7 days, THEN 300 mg 2 (two) times a day for 7 days., Normal, Disp-450 mL, R-0                 Supplementary Documentation:

## 2024-11-30 NOTE — CM/SW NOTE
[facility] ETA 0510  PCS FORM IS COMPLETED        Rosalia Segundo MSN, LESLY, RN  Emergency Department   Extension [phone]

## 2024-11-30 NOTE — ED INITIAL ASSESSMENT (HPI)
EMS states that the patient had dialysis today. States after  the dialysis he  Started to feel like he was choking and was hard to \" bring up mucus\". No C/O Chest pain . Feels short of breat at times.

## 2024-12-02 ENCOUNTER — TELEPHONE (OUTPATIENT)
Dept: ENDOCRINOLOGY CLINIC | Facility: CLINIC | Age: 79
End: 2024-12-02

## 2024-12-02 ENCOUNTER — TELEPHONE (OUTPATIENT)
Dept: INTERNAL MEDICINE CLINIC | Facility: CLINIC | Age: 79
End: 2024-12-02

## 2024-12-02 NOTE — TELEPHONE ENCOUNTER
To  - nasrin Watt from [facility] -patient cameout of rehab has G-Tube  concern - who orders tube feedings? Also BS goes up into the 300 and no orders of insulin .Speech Therapy will evaluate patient -Alysa Monique did not have any notes from Hospital

## 2024-12-02 NOTE — TELEPHONE ENCOUNTER
Called Alysa from [facility] and relayed DR. RICHEY message -verbalized understanding

## 2024-12-02 NOTE — TELEPHONE ENCOUNTER
Best to Coordinate with patient's endocrinologist, sees Dr. Sevilla.  Of note, I have an appointment with him tomorrow for post Hospital follow-up.  Will have to reconcile his medications but if they can reach out to endocrinology prior to his 12/17 appointment with Dr. Sevilla for insulin

## 2024-12-02 NOTE — TELEPHONE ENCOUNTER
Alysa from [facility] is calling and would like to speak to a nurse in regards to the patients over all health.    Please call and advise

## 2024-12-03 ENCOUNTER — LAB ENCOUNTER (OUTPATIENT)
Dept: LAB | Age: 79
End: 2024-12-03
Attending: INTERNAL MEDICINE
Payer: MEDICARE

## 2024-12-03 ENCOUNTER — TELEPHONE (OUTPATIENT)
Dept: INTERNAL MEDICINE CLINIC | Facility: CLINIC | Age: 79
End: 2024-12-03

## 2024-12-03 ENCOUNTER — OFFICE VISIT (OUTPATIENT)
Dept: INTERNAL MEDICINE CLINIC | Facility: CLINIC | Age: 79
End: 2024-12-03

## 2024-12-03 VITALS
TEMPERATURE: 99 F | DIASTOLIC BLOOD PRESSURE: 46 MMHG | BODY MASS INDEX: 25 KG/M2 | HEART RATE: 68 BPM | OXYGEN SATURATION: 97 % | SYSTOLIC BLOOD PRESSURE: 124 MMHG | HEIGHT: 64 IN

## 2024-12-03 DIAGNOSIS — Z99.2 ESRD (END STAGE RENAL DISEASE) ON DIALYSIS (HCC): ICD-10-CM

## 2024-12-03 DIAGNOSIS — I10 PRIMARY HYPERTENSION: ICD-10-CM

## 2024-12-03 DIAGNOSIS — R09.89 RESPIRATORY CRACKLES AT RIGHT LUNG BASE: ICD-10-CM

## 2024-12-03 DIAGNOSIS — E78.2 MIXED HYPERLIPIDEMIA: ICD-10-CM

## 2024-12-03 DIAGNOSIS — R42 DYSEQUILIBRIUM: ICD-10-CM

## 2024-12-03 DIAGNOSIS — G47.33 OSA ON CPAP: ICD-10-CM

## 2024-12-03 DIAGNOSIS — R50.81 FEVER IN OTHER DISEASES: ICD-10-CM

## 2024-12-03 DIAGNOSIS — N18.5 ANEMIA OF CHRONIC RENAL FAILURE, STAGE 5 (HCC): ICD-10-CM

## 2024-12-03 DIAGNOSIS — R07.9 CHEST PAIN, UNSPECIFIED TYPE: ICD-10-CM

## 2024-12-03 DIAGNOSIS — I50.32 CHRONIC DIASTOLIC CONGESTIVE HEART FAILURE (HCC): ICD-10-CM

## 2024-12-03 DIAGNOSIS — Z93.1 S/P PERCUTANEOUS ENDOSCOPIC GASTROSTOMY (PEG) TUBE PLACEMENT (HCC): ICD-10-CM

## 2024-12-03 DIAGNOSIS — D63.1 ANEMIA OF CHRONIC RENAL FAILURE, STAGE 5 (HCC): ICD-10-CM

## 2024-12-03 DIAGNOSIS — R05.9 COUGH: ICD-10-CM

## 2024-12-03 DIAGNOSIS — R13.19 OTHER DYSPHAGIA: ICD-10-CM

## 2024-12-03 DIAGNOSIS — N18.6 ESRD (END STAGE RENAL DISEASE) ON DIALYSIS (HCC): ICD-10-CM

## 2024-12-03 DIAGNOSIS — M54.12 CERVICAL RADICULOPATHY: ICD-10-CM

## 2024-12-03 DIAGNOSIS — E11.40 TYPE 2 DIABETES MELLITUS WITH DIABETIC NEUROPATHY, WITH LONG-TERM CURRENT USE OF INSULIN (HCC): ICD-10-CM

## 2024-12-03 DIAGNOSIS — J18.9 PNEUMONIA DUE TO INFECTIOUS ORGANISM, UNSPECIFIED LATERALITY, UNSPECIFIED PART OF LUNG: ICD-10-CM

## 2024-12-03 DIAGNOSIS — I10 ESSENTIAL HYPERTENSION: ICD-10-CM

## 2024-12-03 DIAGNOSIS — Z79.4 TYPE 2 DIABETES MELLITUS WITH DIABETIC NEUROPATHY, WITH LONG-TERM CURRENT USE OF INSULIN (HCC): ICD-10-CM

## 2024-12-03 DIAGNOSIS — S72.002D CLOSED FRACTURE OF LEFT HIP WITH ROUTINE HEALING, SUBSEQUENT ENCOUNTER: ICD-10-CM

## 2024-12-03 DIAGNOSIS — Z79.4 TYPE 2 DIABETES MELLITUS WITH DIABETIC NEPHROPATHY, WITH LONG-TERM CURRENT USE OF INSULIN (HCC): ICD-10-CM

## 2024-12-03 DIAGNOSIS — I48.0 PAROXYSMAL ATRIAL FIBRILLATION (HCC): ICD-10-CM

## 2024-12-03 DIAGNOSIS — E11.21 TYPE 2 DIABETES MELLITUS WITH DIABETIC NEPHROPATHY, WITH LONG-TERM CURRENT USE OF INSULIN (HCC): ICD-10-CM

## 2024-12-03 DIAGNOSIS — Z09 HOSPITAL DISCHARGE FOLLOW-UP: Primary | ICD-10-CM

## 2024-12-03 LAB
ALBUMIN SERPL-MCNC: 3.4 G/DL (ref 3.2–4.8)
ALBUMIN/GLOB SERPL: 0.9 {RATIO} (ref 1–2)
ALP LIVER SERPL-CCNC: 124 U/L
ALT SERPL-CCNC: 57 U/L
ANION GAP SERPL CALC-SCNC: 3 MMOL/L (ref 0–18)
AST SERPL-CCNC: 71 U/L (ref ?–34)
BASOPHILS # BLD AUTO: 0.06 X10(3) UL (ref 0–0.2)
BASOPHILS NFR BLD AUTO: 0.5 %
BILIRUB SERPL-MCNC: 0.2 MG/DL (ref 0.2–1.1)
BUN BLD-MCNC: 29 MG/DL (ref 9–23)
BUN/CREAT SERPL: 8.7 (ref 10–20)
CALCIUM BLD-MCNC: 9.6 MG/DL (ref 8.7–10.4)
CHLORIDE SERPL-SCNC: 95 MMOL/L (ref 98–112)
CO2 SERPL-SCNC: 37 MMOL/L (ref 21–32)
CREAT BLD-MCNC: 3.32 MG/DL
DEPRECATED HBV CORE AB SER IA-ACNC: 2827 NG/ML
DEPRECATED RDW RBC AUTO: 55.4 FL (ref 35.1–46.3)
EGFRCR SERPLBLD CKD-EPI 2021: 18 ML/MIN/1.73M2 (ref 60–?)
EOSINOPHIL # BLD AUTO: 0.24 X10(3) UL (ref 0–0.7)
EOSINOPHIL NFR BLD AUTO: 2.1 %
ERYTHROCYTE [DISTWIDTH] IN BLOOD BY AUTOMATED COUNT: 16 % (ref 11–15)
EST. AVERAGE GLUCOSE BLD GHB EST-MCNC: 148 MG/DL (ref 68–126)
FASTING STATUS PATIENT QL REPORTED: YES
GLOBULIN PLAS-MCNC: 3.7 G/DL (ref 2–3.5)
GLUCOSE BLD-MCNC: 170 MG/DL (ref 70–99)
HBA1C MFR BLD: 6.8 % (ref ?–5.7)
HCT VFR BLD AUTO: 23.2 %
HGB BLD-MCNC: 7.3 G/DL
IMM GRANULOCYTES # BLD AUTO: 0.12 X10(3) UL (ref 0–1)
IMM GRANULOCYTES NFR BLD: 1.1 %
IRON SATN MFR SERPL: 20 %
IRON SERPL-MCNC: 28 UG/DL
LYMPHOCYTES # BLD AUTO: 1.47 X10(3) UL (ref 1–4)
LYMPHOCYTES NFR BLD AUTO: 13 %
MCH RBC QN AUTO: 29.7 PG (ref 26–34)
MCHC RBC AUTO-ENTMCNC: 31.5 G/DL (ref 31–37)
MCV RBC AUTO: 94.3 FL
MONOCYTES # BLD AUTO: 0.75 X10(3) UL (ref 0.1–1)
MONOCYTES NFR BLD AUTO: 6.6 %
NEUTROPHILS # BLD AUTO: 8.7 X10 (3) UL (ref 1.5–7.7)
NEUTROPHILS # BLD AUTO: 8.7 X10(3) UL (ref 1.5–7.7)
NEUTROPHILS NFR BLD AUTO: 76.7 %
OSMOLALITY SERPL CALC.SUM OF ELEC: 290 MOSM/KG (ref 275–295)
PLATELET # BLD AUTO: 244 10(3)UL (ref 150–450)
POTASSIUM SERPL-SCNC: 3.5 MMOL/L (ref 3.5–5.1)
PROT SERPL-MCNC: 7.1 G/DL (ref 5.7–8.2)
RBC # BLD AUTO: 2.46 X10(6)UL
SODIUM SERPL-SCNC: 135 MMOL/L (ref 136–145)
TIBC SERPL-MCNC: 142 UG/DL (ref 250–425)
TRANSFERRIN SERPL-MCNC: 95 MG/DL (ref 215–365)
TROPONIN I SERPL HS-MCNC: 52 NG/L
WBC # BLD AUTO: 11.3 X10(3) UL (ref 4–11)

## 2024-12-03 PROCEDURE — 3074F SYST BP LT 130 MM HG: CPT | Performed by: INTERNAL MEDICINE

## 2024-12-03 PROCEDURE — 80053 COMPREHEN METABOLIC PANEL: CPT

## 2024-12-03 PROCEDURE — 83540 ASSAY OF IRON: CPT

## 2024-12-03 PROCEDURE — 1125F AMNT PAIN NOTED PAIN PRSNT: CPT | Performed by: INTERNAL MEDICINE

## 2024-12-03 PROCEDURE — 84484 ASSAY OF TROPONIN QUANT: CPT

## 2024-12-03 PROCEDURE — G2211 COMPLEX E/M VISIT ADD ON: HCPCS | Performed by: INTERNAL MEDICINE

## 2024-12-03 PROCEDURE — 99499 UNLISTED E&M SERVICE: CPT | Performed by: INTERNAL MEDICINE

## 2024-12-03 PROCEDURE — 84466 ASSAY OF TRANSFERRIN: CPT

## 2024-12-03 PROCEDURE — 1159F MED LIST DOCD IN RCRD: CPT | Performed by: INTERNAL MEDICINE

## 2024-12-03 PROCEDURE — 82728 ASSAY OF FERRITIN: CPT

## 2024-12-03 PROCEDURE — 83036 HEMOGLOBIN GLYCOSYLATED A1C: CPT

## 2024-12-03 PROCEDURE — 36415 COLL VENOUS BLD VENIPUNCTURE: CPT

## 2024-12-03 PROCEDURE — 3078F DIAST BP <80 MM HG: CPT | Performed by: INTERNAL MEDICINE

## 2024-12-03 PROCEDURE — 99215 OFFICE O/P EST HI 40 MIN: CPT | Performed by: INTERNAL MEDICINE

## 2024-12-03 PROCEDURE — 85025 COMPLETE CBC W/AUTO DIFF WBC: CPT

## 2024-12-03 PROCEDURE — 1160F RVW MEDS BY RX/DR IN RCRD: CPT | Performed by: INTERNAL MEDICINE

## 2024-12-03 RX ORDER — ALBUTEROL SULFATE 0.83 MG/ML
2.5 SOLUTION RESPIRATORY (INHALATION) EVERY 4 HOURS PRN
Qty: 360 ML | Refills: 3 | Status: ON HOLD | OUTPATIENT
Start: 2024-12-03 | End: 2025-02-21

## 2024-12-03 RX ORDER — CHOLECALCIFEROL (VITAMIN D3) 10(400)/ML
1 DROPS ORAL DAILY
Qty: 30 EACH | Refills: 3 | Status: ON HOLD | OUTPATIENT
Start: 2024-12-03

## 2024-12-03 RX ORDER — ACETAMINOPHEN 500 MG
500 TABLET ORAL 2 TIMES DAILY
Status: ON HOLD | COMMUNITY

## 2024-12-03 RX ORDER — FLUTICASONE PROPIONATE 50 MCG
2 SPRAY, SUSPENSION (ML) NASAL DAILY
Qty: 48 G | Refills: 3 | Status: ON HOLD | OUTPATIENT
Start: 2024-12-03

## 2024-12-03 RX ORDER — THERMOMETER, TEMPLE ELECTRONIC
EACH MISCELLANEOUS
Qty: 1 EACH | Refills: 0 | Status: ON HOLD | OUTPATIENT
Start: 2024-12-03

## 2024-12-03 RX ORDER — FOLIC ACID/VIT B COMPLEX AND C 0.8 MG
1 TABLET ORAL DAILY
Qty: 90 TABLET | Refills: 3 | Status: ON HOLD | OUTPATIENT
Start: 2024-12-03

## 2024-12-03 RX ORDER — AMIODARONE HYDROCHLORIDE 200 MG/1
200 TABLET ORAL DAILY
Qty: 90 TABLET | Refills: 3 | Status: ON HOLD | OUTPATIENT
Start: 2024-12-03

## 2024-12-03 RX ORDER — BUDESONIDE AND FORMOTEROL FUMARATE DIHYDRATE 160; 4.5 UG/1; UG/1
2 AEROSOL RESPIRATORY (INHALATION) 2 TIMES DAILY
Qty: 3 EACH | Refills: 3 | Status: ON HOLD | OUTPATIENT
Start: 2024-12-03

## 2024-12-03 RX ORDER — ATORVASTATIN CALCIUM 40 MG/1
40 TABLET, FILM COATED ORAL NIGHTLY
Qty: 90 TABLET | Refills: 3 | Status: ON HOLD | OUTPATIENT
Start: 2024-12-03

## 2024-12-03 RX ORDER — GABAPENTIN 250 MG/5ML
300 SOLUTION ORAL 2 TIMES DAILY
Qty: 450 ML | Refills: 3 | Status: ON HOLD | OUTPATIENT
Start: 2024-12-03 | End: 2025-03-03

## 2024-12-03 RX ORDER — INSULIN GLARGINE 100 [IU]/ML
8 INJECTION, SOLUTION SUBCUTANEOUS EVERY MORNING
Qty: 15 ML | Refills: 0 | Status: ON HOLD | OUTPATIENT
Start: 2024-12-03 | End: 2025-06-09

## 2024-12-03 RX ORDER — HYDROCODONE BITARTRATE AND ACETAMINOPHEN 5; 325 MG/1; MG/1
1 TABLET ORAL EVERY 8 HOURS PRN
Qty: 90 TABLET | Refills: 0 | Status: ON HOLD | OUTPATIENT
Start: 2024-12-03

## 2024-12-03 RX ORDER — CARVEDILOL 25 MG/1
25 TABLET ORAL 2 TIMES DAILY
Qty: 180 TABLET | Refills: 3 | Status: ON HOLD | OUTPATIENT
Start: 2024-12-03

## 2024-12-03 RX ORDER — LINAGLIPTIN 5 MG/1
5 TABLET, FILM COATED ORAL DAILY
Qty: 90 TABLET | Refills: 3 | Status: ON HOLD | OUTPATIENT
Start: 2024-12-03

## 2024-12-03 RX ORDER — CLOPIDOGREL BISULFATE 75 MG/1
75 TABLET ORAL DAILY
Qty: 90 TABLET | Refills: 3 | Status: ON HOLD | OUTPATIENT
Start: 2024-12-03

## 2024-12-03 RX ORDER — ALBUTEROL SULFATE 90 UG/1
2 INHALANT RESPIRATORY (INHALATION) EVERY 4 HOURS PRN
Qty: 3 EACH | Refills: 3 | Status: ON HOLD | OUTPATIENT
Start: 2024-12-03

## 2024-12-03 RX ORDER — FOLIC ACID/VIT B COMPLEX AND C 0.8 MG
TABLET ORAL DAILY
COMMUNITY
End: 2024-12-03

## 2024-12-03 RX ORDER — NIACINAMIDE, ADENOSINE 1; .02 G/50ML; G/50ML
CREAM TOPICAL
Qty: 200 EACH | Refills: 3 | Status: ON HOLD | OUTPATIENT
Start: 2024-12-03

## 2024-12-03 NOTE — PROGRESS NOTES
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 components found for: \"URINEMICROALBUMIN\"  Current medications:  linagliptin 5 mg daily  Eye exam: Seen by Dr. Astrid Kline 8/27/2024  Foot exam: Check feet daily  - Gabapentin for neuropathy  - Nutrition optimization recommended  - Dexcom - started with good improvement of sugars  -Will resume Lantus 8 units once a day.  Will await further recommendations from endocrinology, has an appointment 12/17.     ESRD on hemodialysis  - 2/2 prolonged history of diabetes and hypertension  - Follows with Dr. Martinez  -HD per nephrology as scheduled  - On Neupogen per nephrology     Hypertension  - carvedilol 25 mg BID, hydralazine 25mg q8h, felodipine 10mg/day (on amlodipine in hosp due to formulary)     Hyperlipidemia  - Continue with On atorvastatin 40 mg daily, aspirin     BPH  -no longer takes trospium or tamsulosin     Pulmonary hypertension  -Comanagement of KRAIG and chronic diastolic heart failure  - stable     KRAIG on CPAP  - sees pulm Dr. Landeros     Cataracts  -Seems to be stable, follows with Dr. Chase of ophthalmology     Gout  Flareup in 6/2022. NO recurrence  -Seems to be stable     Anemia of ESRD  -Baseline hemoglobin seems to be around 10-11  -managed by nephrology; on aranesp  -Most recent hemoglobin 11/4 -  8.9  - Plan  to repeat CBC, iron studies     Sensorineural hearing loss  Mild-moderate per audiology testing .  He may be a hearing aid candidate in the future     Unsteady Gait  Ongoing fatigue due to multiple comorbidities as above  - uses a walker, 2 wheeled-2 constipation.  Does not want a rollator walker at this time  - He is a fall risk with his unsteady gait, fatigue.  - Management of physical therapy as above     Anxiety  Lexapro 5 mg once a day.           Orders This Visit:  Orders Placed This Encounter   Procedures    CBC With Differential With Platelet    Comp Metabolic Panel (14)    Troponin I (High Sensitivity) [E]    Iron And Tibc    Hemoglobin A1C    Ferritin       Meds This Visit:  Requested Prescriptions     Signed Prescriptions Disp Refills    albuterol (2.5 MG/3ML) 0.083% Inhalation Nebu Soln 360 mL 3     Sig: Take 3 mL (2.5 mg total) by nebulization every 4 (four) hours as needed for Wheezing or Shortness of Breath.    HYDROcodone-acetaminophen 5-325 MG Oral Tab 90 tablet 0     Si tablet by Per G Tube route every 8 (eight) hours as needed.    Gabapentin 300 MG/6ML Oral Solution 450 mL 3     Si mg by Peg Tube route in the morning and 300 mg before bedtime.    Incontinence Supply Disposable (COMFORT PROTECT ADULT DIAPER/L) Does not apply Misc 30 each 3     Si Application daily.    Electronic Thermometer ([employer] DIGITAL THERMOMETER TEMPLE) Does not apply Misc 1 each 0     Sig: Check temperature    insulin glargine (LANTUS SOLOSTAR) 100 UNIT/ML Subcutaneous Solution Pen-injector 15 mL 0     Sig: Inject 8 Units into the skin every morning.    Insulin Pen Needle 32G X 4 MM Does not apply Misc 200 each 3     Sig: Take as directed    linaGLIPtin (TRADJENTA) 5 mg Oral Tab 90 tablet 3     Si tablet (5 mg total) by Per G Tube route daily.    atorvastatin 40 MG Oral Tab 90 tablet 3     Si tablet (40 mg total) by Per G Tube route nightly.    amiodarone 200 MG Oral Tab 90 tablet 3     Si tablet  (200 mg total) by Per G Tube route daily.    carvedilol 25 MG Oral Tab 180 tablet 3     Si tablet (25 mg total) by Per G Tube route 2 (two) times daily.    B Complex-C-Folic Acid (RENAL MULTIVITAMIN FORMULA) Oral Tab 90 tablet 3     Si tablet by Per G Tube route daily.    clopidogrel 75 MG Oral Tab 90 tablet 3     Sig: Take 1 tablet (75 mg total) by mouth daily.    albuterol (PROAIR HFA) 108 (90 Base) MCG/ACT Inhalation Aero Soln 3 each 3     Sig: Inhale 2 puffs into the lungs every 4 (four) hours as needed for Wheezing.    Budesonide-Formoterol Fumarate (SYMBICORT) 160-4.5 MCG/ACT Inhalation Aerosol 3 each 3     Sig: Inhale 2 puffs into the lungs 2 (two) times daily.    fluticasone propionate 50 MCG/ACT Nasal Suspension 48 g 3     Si sprays by Nasal route daily.       Imaging & Referrals:  [facility] RESPIRATORY SERVICE   [facility] - INTERNAL  RESIDENTIAL HOME HEALTH REFERRAL     Health Maintenance  Due for diabetic eye examination    Spent 60 minutes obtaining history, evaluating patient, discussing treatment options, diet, exercise, review of available labs and radiology reports, and completing documentation. High complexity case      Return to clinic in 6-8 Medicare annual physical examination    Wili Parmar MD, 24, 8:46 AM

## 2024-12-03 NOTE — TELEPHONE ENCOUNTER
Pharmacy called and verified medications including Gabapentin with dispense changed to be covered by insurance with same dose and frequency.

## 2024-12-03 NOTE — PATIENT INSTRUCTIONS
You were seen in clinic today for posthospitalization follow-up.  Today, we did review her most recent hospitalization.  There is concern for pneumonia, and risk for recurrent aspiration pneumonia  - You responded well to antibiotic treatment  - We did require PEG tube placement for safe nutrition intake and medication administration.  We will need to continue with ongoing speech therapy  - Continue with physical therapy with your recent hip fracture.  Goal is to maintain strength and conditioning to reduce risk of falls moving forward    We are repeating some blood test today to follow-up on your blood counts, iron levels    Follow-up with Dr. Sevilla for management of insulin while we are on tube feeds.  - Periodically check her sugars at home, Dexcom will help with monitoring for low sugars  - Lets resume Lantus 8 units once a day and await Dr. Sevilla's recommendation for further management of the sugars    Continue checking your blood pressures at home  - Cardiac status seems to be stable, continue with the precautions as outlined after your Watchman procedure    We are checking blood test today to evaluate the heart.  We will also follow-up on your blood counts    We are placing a new order for residential home health  -We will need some assistance with home health nursing, physical therapy, Occupational Therapy, nursing aide.  Will also request dietitian, wound ostomy services  - For the larger pieces of equipment, will need their assistance with social work for a smaller battery-operated Cassidy lift, possibly management of the hospital bed  - Will need assistance with arranging for motorized scooter, power wheelchair.  - We also need assistance with tube feed management     Please follow-up with gastroenterology, Dr. Sheppard for management of G-tube    Return to clinic in 6-8 weeks for follow-up.        In the meantime, we have placed orders for management of smaller pices of equipment including nebulizer  device, bed pad, further supplies as we transition back to home    We are requesting the records from [facility] to assess the full situation of the difficulty of swallowing.  As of now, there is some generalized weakness impairing the swallowing muscles    Lets plan to follow-up again within 6-8 weeks for dedicated physical examination.

## 2024-12-03 NOTE — TELEPHONE ENCOUNTER
Namita VALERA, from , [phone], called to inform Dr. Parmar that she evaluated patient today.    She will work on swalling decificts and voicing.    Swallow study done last week is showing patient is still severe.     Patient can do trials of thin water by teaspoons and puree solids but only by trial.     No call back need unless questions or concerns.

## 2024-12-03 NOTE — TELEPHONE ENCOUNTER
Geraldine KIRKPATRICK from [facility], [phone], called to let Dr. Parmar know that she may be moving this weeks appointment to next week if she is unable to do this Saturday.     No call back needed if provider is ok with appointment change.

## 2024-12-03 NOTE — TELEPHONE ENCOUNTER
[place] pharmacist called to verify that the prescription for Hydrocodone is for the G-Tube     Please contact pharmacy [phone]

## 2024-12-03 NOTE — TELEPHONE ENCOUNTER
Patient was in the office today and saw Dr Parmar    Patient was advised to schedule a 6 week follow up but doctors schedule is full.  Please advise

## 2024-12-03 NOTE — TELEPHONE ENCOUNTER
Home health reached out and advised reaching out to endocrinology as there is an interval change with G-tube use with tube feeds.  Has appointment me today for medication reconciliation

## 2024-12-04 ENCOUNTER — NURSE ONLY (OUTPATIENT)
Dept: ENDOCRINOLOGY CLINIC | Facility: CLINIC | Age: 79
End: 2024-12-04

## 2024-12-04 DIAGNOSIS — E11.22 TYPE 2 DIABETES MELLITUS WITH ESRD (END-STAGE RENAL DISEASE) (HCC): Primary | ICD-10-CM

## 2024-12-04 DIAGNOSIS — N18.6 TYPE 2 DIABETES MELLITUS WITH ESRD (END-STAGE RENAL DISEASE) (HCC): Primary | ICD-10-CM

## 2024-12-04 RX ORDER — INSULIN ASPART 100 [IU]/ML
INJECTION, SOLUTION INTRAVENOUS; SUBCUTANEOUS
Qty: 6 ML | Refills: 1 | Status: SHIPPED | OUTPATIENT
Start: 2024-12-04 | End: 2024-12-05 | Stop reason: ALTCHOICE

## 2024-12-04 RX ORDER — GLUCAGON INJECTION, SOLUTION 1 MG/.2ML
1 INJECTION, SOLUTION SUBCUTANEOUS ONCE AS NEEDED
Qty: 1 EACH | Refills: 0 | Status: SHIPPED | OUTPATIENT
Start: 2024-12-04 | End: 2024-12-04

## 2024-12-04 NOTE — PROGRESS NOTES
Continuous Glucose Monitor Download - dexcom    Ollie Hernández  4/28/1945      Medical Background        Lab Results   Component Value Date    A1C 6.8 (H) 12/03/2024    A1C 4.7 07/04/2024    A1C 5.3 04/02/2024    A1C 5.5 09/11/2023    A1C 6.1 (H) 06/23/2023         Patient has T2DM    Medication Review      DM Meds: Lantus SoloStar Sopn - 100 UNIT/ML; Tradjenta Tabs - 5 MG     Medications at last endocrinology appointment:8/20/24  Lantus  6 units daily   linagliptin 5 mg daily        Patient Currently Taking:   Steroids? no  Chemotherapy? no  Tradjenta 5 mg daily via tube feed.  Has not taken any insulin since has been home from rehab.  Patient on tube feedings for the last 4-5 weeks. Was on rehab until 11/25/24. Not taking anything by mouth.       Blood Glucose Review          Interpretation of Data:                      Patient Concerns      Spouse is concerned about hypoglycemia treatment now that pt can't swallow. Gvoke pen.  Supposed to get 4 feedings per day, getting 3 right now. Last feeding around 2030. First feeding between 3646-3091.  Has not been on insulin since he's been home. Patient's spouse believes patient was not on insulin during rehab.   Was taking insulin in the evening.   States patient complains of burning to his feet and has several wounds. Has appointment with podiatrist tomorrow 12/5/24    Insulin teaching given to patient's wife Stephy.     Topics Discussed:  Use of pen and needle  Sites to dose insulin  Before and after care of skin  Storage of insulin       Stephy performed accurate teach back of information supplied by Educator and is ready to start insulin dosing at home.    Meals and Dosing Timing:    On tube feedings three times daily for the past 4-5 weeks. Patient is supposed to go to 4 feedings daily. Currently last feeding of the day around 6122-2470 and first feeding at 0600.    Recommendations / Plan / Goals   -Gvoke pen education completed.  -Consulted with Dr. Sevilla  over the phone. Recommended to start novolog.   - Take Novolog 10-15 minutes before feedings. Use scale below.   130-150   1 unit  151-200   2 units  201-250   3 units  251-300   4 units  > 301   5 units    - Will call for update on Friday as scheduled.           Next Follow up scheduled for 12/6/24 and 12/17/24 with Dr. Sevilla      Routed to provider for review.      12/4/2024  Ashley PICHARDON RN [facility]  Diabetes Care &      A total of 90 minutes was spent with the patient including chart review, discussion and education / advisement pertinent to patient and provider specified concerns as documented above.     Note to patient: The 21 Century Cures Act makes medical notes like these available to patients in the interest of transparency. However, be advised this is a medical document. It is intended as peer to peer communication. It is written in medical language and may contain abbreviations or verbiage that are unfamiliar. It may appear blunt or direct. Medical documents are intended to carry relevant information, facts as evident, and the clinical opinion of the practitioner.

## 2024-12-04 NOTE — TELEPHONE ENCOUNTER
Okay to schedule for 1 hour appointment at any time time on the week of January 13.  Okay to use any MD approval/same-day sick slots to accommodate this.  Please schedule as Medicare physical

## 2024-12-04 NOTE — TELEPHONE ENCOUNTER
Called Alysa. States she did initial visit but other  nurse follows patient care. Provided name and phone number for HH nurse:  Franca [phone]    Called Franca states she saw patient briefly 3 days ago, seeing him tonight at 5pm  States she received message that pt does not have any insulin and sugars have been in 300s   Per chart review, PCP prescribed Lantus yesterday    Called Stephy, patient spouse, KATHY on file.    Hyperglycemia     Onset of hyperglycemia: ongoing since release from Rehab    BG levels: wears dexcom G7, uses reader  This AM, BG was 124  Right now BG is 215  Will go up to 300 right after meals       Change in Diet: tube feeding  Started tube feedings around 9th November  Gets tube feedings 3 times per day through G-Tube    List DM Medications/Compliance:  Hasn't received insulin in 3 1/2 weeks. Patient spouse was confused if he should have insulin with tube feedings.   Did  Lantus yesterday- has not given patient any. Patient spouse is not sure how to use insulin pen. Walked spouse through how to use pen over the phone.       Scheduled appointment with Ashley gonzalez at 4pm for education, Dexcom download, and plan of care.

## 2024-12-05 ENCOUNTER — TELEPHONE (OUTPATIENT)
Dept: INTERNAL MEDICINE CLINIC | Facility: CLINIC | Age: 79
End: 2024-12-05

## 2024-12-05 ENCOUNTER — TELEPHONE (OUTPATIENT)
Dept: ENDOCRINOLOGY CLINIC | Facility: CLINIC | Age: 79
End: 2024-12-05

## 2024-12-05 DIAGNOSIS — T17.908S ASPIRATION INTO AIRWAY, SEQUELA: Primary | ICD-10-CM

## 2024-12-05 DIAGNOSIS — E11.22 TYPE 2 DIABETES MELLITUS WITH ESRD (END-STAGE RENAL DISEASE) (HCC): Primary | ICD-10-CM

## 2024-12-05 DIAGNOSIS — N18.6 TYPE 2 DIABETES MELLITUS WITH ESRD (END-STAGE RENAL DISEASE) (HCC): Primary | ICD-10-CM

## 2024-12-05 RX ORDER — INSULIN LISPRO 100 [IU]/ML
INJECTION, SOLUTION INTRAVENOUS; SUBCUTANEOUS
Qty: 6 ML | Refills: 1 | Status: ON HOLD | OUTPATIENT
Start: 2024-12-05

## 2024-12-05 NOTE — TELEPHONE ENCOUNTER
Magda JERRY called from [facility], requesting to speak to a nurse in regards to safety and aspirations     Please call back, [phone]

## 2024-12-05 NOTE — TELEPHONE ENCOUNTER
Endocrine refill protocol for rapid acting, regular, intermediate, and mixed insulin:    Protocol Criteria:  PASSED Reason: N/A    If all below requirements are met, send a 90-day supply with 1 refill per provider protocol.    Verify appointment with Endocrinology completed in the last 6 months or scheduled in the next 3 months.  Verify A1C has been completed within the last 6 months and is below 8.5%    -May substitute prescriptions for Novolog and Humalog unless documented allergy (pens and vials) at the same dose and concentration per insurance preference and provider protocol.   -May substitute prescriptions for Novolin R and Humulin R unless documented allergy (pens and vials) at the same dose and concentration per insurance preference and provider protocol.   -May substitute prescriptions for Novolin N and Humulin N unless documented allergy (pens and vials) at the same dose and concentration per insurance preference and provider protocol.   -May substitute prescriptions for Humulin and Novolin 70/30 insulin unless documented allergy at the same dose and concentration per insurance preference and provider protocol.    Last completed office visit: 8/20/2024 Pushpa Sevilla MD   Next scheduled Follow up:   Future Appointments   Date Time Provider Department Center   12/6/2024 12:00 PM [facility]   12/17/2024 11:00 AM Di, Pushpa, MD [facility]      Last A1C result: 6.8% done 12/3/2024.

## 2024-12-05 NOTE — TELEPHONE ENCOUNTER
To Dr. RICHEY--please review and advise.     Home health RN calling to report safety concern. States, patients daughter and spouse have been lifting him. Neither are equipped to do so as daughter has a rotator cuff tear and spouse is weak.  No proper lifting equipment. States that pt is at increased risk of injury/accident.   HHNR has spoken to the daughter about her concern and recommendation for SNF, however, spouse is reluctant.   Home health RN has put in a request for a  to visit, assess situation and determine her recommendation. SW expected next week. She will call with plan of care and at that time will need either a verbal or written order for SNF.     Patient began G-Tube bolus feedings today (every 6hrs). Previously, pt was po. She reports possible aspiration. She auscultated and heard slight wheezing to Rt lower lung.   Denies cough or shortness of breath.   T98.6F, 128/56, P65, 92% RA.

## 2024-12-06 ENCOUNTER — APPOINTMENT (OUTPATIENT)
Dept: GENERAL RADIOLOGY | Facility: HOSPITAL | Age: 79
End: 2024-12-06
Attending: EMERGENCY MEDICINE
Payer: MEDICARE

## 2024-12-06 ENCOUNTER — TELEPHONE (OUTPATIENT)
Dept: ENDOCRINOLOGY CLINIC | Facility: CLINIC | Age: 79
End: 2024-12-06

## 2024-12-06 ENCOUNTER — HOSPITAL ENCOUNTER (INPATIENT)
Facility: HOSPITAL | Age: 79
End: 2024-12-06
Attending: EMERGENCY MEDICINE
Payer: MEDICARE

## 2024-12-06 ENCOUNTER — HOSPITAL ENCOUNTER (INPATIENT)
Facility: HOSPITAL | Age: 79
LOS: 17 days | Discharge: NURSING, REHAB OR LONG-TERM CARE | End: 2024-12-23
Attending: EMERGENCY MEDICINE
Payer: MEDICARE

## 2024-12-06 DIAGNOSIS — N18.6 ESRD ON HEMODIALYSIS (HCC): ICD-10-CM

## 2024-12-06 DIAGNOSIS — Z99.2 ESRD ON HEMODIALYSIS (HCC): ICD-10-CM

## 2024-12-06 DIAGNOSIS — G62.9 PERIPHERAL POLYNEUROPATHY: Primary | ICD-10-CM

## 2024-12-06 LAB
ALBUMIN SERPL-MCNC: 3.2 G/DL (ref 3.2–4.8)
ALP LIVER SERPL-CCNC: 123 U/L
ALT SERPL-CCNC: 38 U/L
ANION GAP SERPL CALC-SCNC: 6 MMOL/L (ref 0–18)
AST SERPL-CCNC: 42 U/L (ref ?–34)
BASOPHILS # BLD AUTO: 0.05 X10(3) UL (ref 0–0.2)
BASOPHILS NFR BLD AUTO: 0.4 %
BILIRUB DIRECT SERPL-MCNC: <0.1 MG/DL (ref ?–0.3)
BILIRUB SERPL-MCNC: 0.2 MG/DL (ref 0.2–1.1)
BUN BLD-MCNC: 37 MG/DL (ref 9–23)
BUN/CREAT SERPL: 9.2 (ref 10–20)
CALCIUM BLD-MCNC: 9.5 MG/DL (ref 8.7–10.4)
CHLORIDE SERPL-SCNC: 92 MMOL/L (ref 98–112)
CO2 SERPL-SCNC: 35 MMOL/L (ref 21–32)
CREAT BLD-MCNC: 4.04 MG/DL
DEPRECATED RDW RBC AUTO: 55 FL (ref 35.1–46.3)
EGFRCR SERPLBLD CKD-EPI 2021: 15 ML/MIN/1.73M2 (ref 60–?)
EOSINOPHIL # BLD AUTO: 0.21 X10(3) UL (ref 0–0.7)
EOSINOPHIL NFR BLD AUTO: 1.9 %
ERYTHROCYTE [DISTWIDTH] IN BLOOD BY AUTOMATED COUNT: 16.4 % (ref 11–15)
GLUCOSE BLD-MCNC: 196 MG/DL (ref 70–99)
GLUCOSE BLDC GLUCOMTR-MCNC: 146 MG/DL (ref 70–99)
GLUCOSE BLDC GLUCOMTR-MCNC: 158 MG/DL (ref 70–99)
HCT VFR BLD AUTO: 24.1 %
HGB BLD-MCNC: 7.7 G/DL
IMM GRANULOCYTES # BLD AUTO: 0.08 X10(3) UL (ref 0–1)
IMM GRANULOCYTES NFR BLD: 0.7 %
LYMPHOCYTES # BLD AUTO: 1.59 X10(3) UL (ref 1–4)
LYMPHOCYTES NFR BLD AUTO: 14.1 %
MAGNESIUM SERPL-MCNC: 2.3 MG/DL (ref 1.6–2.6)
MCH RBC QN AUTO: 30 PG (ref 26–34)
MCHC RBC AUTO-ENTMCNC: 32 G/DL (ref 31–37)
MCV RBC AUTO: 93.8 FL
MONOCYTES # BLD AUTO: 0.78 X10(3) UL (ref 0.1–1)
MONOCYTES NFR BLD AUTO: 6.9 %
NEUTROPHILS # BLD AUTO: 8.56 X10 (3) UL (ref 1.5–7.7)
NEUTROPHILS # BLD AUTO: 8.56 X10(3) UL (ref 1.5–7.7)
NEUTROPHILS NFR BLD AUTO: 76 %
OSMOLALITY SERPL CALC.SUM OF ELEC: 290 MOSM/KG (ref 275–295)
PLATELET # BLD AUTO: 272 10(3)UL (ref 150–450)
POTASSIUM SERPL-SCNC: 4 MMOL/L (ref 3.5–5.1)
PROT SERPL-MCNC: 6.9 G/DL (ref 5.7–8.2)
RBC # BLD AUTO: 2.57 X10(6)UL
SODIUM SERPL-SCNC: 133 MMOL/L (ref 136–145)
WBC # BLD AUTO: 11.3 X10(3) UL (ref 4–11)

## 2024-12-06 PROCEDURE — 71045 X-RAY EXAM CHEST 1 VIEW: CPT | Performed by: EMERGENCY MEDICINE

## 2024-12-06 PROCEDURE — 99223 1ST HOSP IP/OBS HIGH 75: CPT | Performed by: INTERNAL MEDICINE

## 2024-12-06 RX ORDER — ASPIRIN 81 MG/1
81 TABLET ORAL DAILY
Status: DISCONTINUED | OUTPATIENT
Start: 2024-12-07 | End: 2024-12-13 | Stop reason: ALTCHOICE

## 2024-12-06 RX ORDER — INSULIN DEGLUDEC 100 U/ML
5 INJECTION, SOLUTION SUBCUTANEOUS NIGHTLY
Status: DISCONTINUED | OUTPATIENT
Start: 2024-12-06 | End: 2024-12-23

## 2024-12-06 RX ORDER — ACETAMINOPHEN 500 MG
500 TABLET ORAL EVERY 4 HOURS PRN
Status: DISCONTINUED | OUTPATIENT
Start: 2024-12-06 | End: 2024-12-15

## 2024-12-06 RX ORDER — AMIODARONE HYDROCHLORIDE 200 MG/1
200 TABLET ORAL DAILY
Status: DISCONTINUED | OUTPATIENT
Start: 2024-12-07 | End: 2024-12-22

## 2024-12-06 RX ORDER — POLYETHYLENE GLYCOL 3350 17 G/17G
17 POWDER, FOR SOLUTION ORAL DAILY PRN
Status: DISCONTINUED | OUTPATIENT
Start: 2024-12-06 | End: 2024-12-23

## 2024-12-06 RX ORDER — LANSOPRAZOLE 30 MG/1
30 TABLET, ORALLY DISINTEGRATING, DELAYED RELEASE ORAL
Status: DISCONTINUED | OUTPATIENT
Start: 2024-12-07 | End: 2024-12-22

## 2024-12-06 RX ORDER — ACETAMINOPHEN 325 MG/1
650 TABLET ORAL EVERY 4 HOURS PRN
Status: DISCONTINUED | OUTPATIENT
Start: 2024-12-06 | End: 2024-12-23

## 2024-12-06 RX ORDER — FLUTICASONE PROPIONATE 50 MCG
2 SPRAY, SUSPENSION (ML) NASAL DAILY
Status: DISCONTINUED | OUTPATIENT
Start: 2024-12-06 | End: 2024-12-23

## 2024-12-06 RX ORDER — CARVEDILOL 25 MG/1
25 TABLET ORAL 2 TIMES DAILY
Status: DISCONTINUED | OUTPATIENT
Start: 2024-12-06 | End: 2024-12-13

## 2024-12-06 RX ORDER — HYDROCODONE BITARTRATE AND ACETAMINOPHEN 5; 325 MG/1; MG/1
2 TABLET ORAL EVERY 4 HOURS PRN
Status: DISCONTINUED | OUTPATIENT
Start: 2024-12-06 | End: 2024-12-07

## 2024-12-06 RX ORDER — BISACODYL 10 MG
10 SUPPOSITORY, RECTAL RECTAL
Status: DISCONTINUED | OUTPATIENT
Start: 2024-12-06 | End: 2024-12-23

## 2024-12-06 RX ORDER — FLUTICASONE PROPIONATE AND SALMETEROL 250; 50 UG/1; UG/1
1 POWDER RESPIRATORY (INHALATION) 2 TIMES DAILY
Status: DISCONTINUED | OUTPATIENT
Start: 2024-12-06 | End: 2024-12-06

## 2024-12-06 RX ORDER — SENNOSIDES 8.6 MG
17.2 TABLET ORAL NIGHTLY PRN
Status: DISCONTINUED | OUTPATIENT
Start: 2024-12-06 | End: 2024-12-23

## 2024-12-06 RX ORDER — NICOTINE POLACRILEX 4 MG
15 LOZENGE BUCCAL
Status: DISCONTINUED | OUTPATIENT
Start: 2024-12-06 | End: 2024-12-23

## 2024-12-06 RX ORDER — MORPHINE SULFATE 4 MG/ML
4 INJECTION, SOLUTION INTRAMUSCULAR; INTRAVENOUS ONCE
Status: COMPLETED | OUTPATIENT
Start: 2024-12-06 | End: 2024-12-06

## 2024-12-06 RX ORDER — HEPARIN SODIUM 5000 [USP'U]/ML
5000 INJECTION, SOLUTION INTRAVENOUS; SUBCUTANEOUS EVERY 8 HOURS SCHEDULED
Status: DISCONTINUED | OUTPATIENT
Start: 2024-12-06 | End: 2024-12-23

## 2024-12-06 RX ORDER — HYDROCODONE BITARTRATE AND ACETAMINOPHEN 5; 325 MG/1; MG/1
1 TABLET ORAL EVERY 4 HOURS PRN
Status: DISCONTINUED | OUTPATIENT
Start: 2024-12-06 | End: 2024-12-13

## 2024-12-06 RX ORDER — HYDROCODONE BITARTRATE AND ACETAMINOPHEN 5; 325 MG/1; MG/1
1 TABLET ORAL ONCE
Status: DISCONTINUED | OUTPATIENT
Start: 2024-12-06 | End: 2024-12-06

## 2024-12-06 RX ORDER — GABAPENTIN 250 MG/5ML
300 SOLUTION ORAL 2 TIMES DAILY
Status: DISCONTINUED | OUTPATIENT
Start: 2024-12-06 | End: 2024-12-07

## 2024-12-06 RX ORDER — NICOTINE POLACRILEX 4 MG
30 LOZENGE BUCCAL
Status: DISCONTINUED | OUTPATIENT
Start: 2024-12-06 | End: 2024-12-23

## 2024-12-06 RX ORDER — SODIUM CHLORIDE 9 MG/ML
INJECTION, SOLUTION INTRAVENOUS CONTINUOUS
Status: DISCONTINUED | OUTPATIENT
Start: 2024-12-06 | End: 2024-12-07

## 2024-12-06 RX ORDER — ATORVASTATIN CALCIUM 40 MG/1
40 TABLET, FILM COATED ORAL NIGHTLY
Status: DISCONTINUED | OUTPATIENT
Start: 2024-12-06 | End: 2024-12-23

## 2024-12-06 RX ORDER — CLOPIDOGREL BISULFATE 75 MG/1
75 TABLET ORAL DAILY
Status: DISCONTINUED | OUTPATIENT
Start: 2024-12-07 | End: 2024-12-17

## 2024-12-06 RX ORDER — DEXTROSE MONOHYDRATE 25 G/50ML
50 INJECTION, SOLUTION INTRAVENOUS
Status: DISCONTINUED | OUTPATIENT
Start: 2024-12-06 | End: 2024-12-23

## 2024-12-06 NOTE — TELEPHONE ENCOUNTER
Lets check in with the family.  - I like to get a chest x-ray to make sure that there is no aspiration to reduce risk for pneumonia  - We should await the social work evaluation, we did request the smaller battery-operated Cassidy lift but this will require the assistance of social work for medical equipment.  -Can we also asked the family to make sure that they have received all of the necessary supplies from our office visit?  If we can clarify to see what they have not received in terms of the smaller supplies such as adult diapers, bed pads, etc.

## 2024-12-06 NOTE — H&P
[facility]  part of [facility]  HISTORY AND PHYSICAL       Ollie Hernández Patient Status:  Emergency    1945  79 year old CSN [id number]   Location 19[facility] Attending Scott Morrow MD     PCP Wili Parmar MD         DATE OF ADMISSION: 2024     CHIEF COMPLAINT: Bilateral leg pain    HISTORY OF PRESENT ILLNESS  This is a 79 year oldmale who presents complaining of bilateral lower extremity pain.  Patient states symptoms have been progressively worsening for some time.  Did note a significant increase in pain around 3 to 4 days prior.  Patient initially came to the ED and was prescribed gabapentin.  Patient has been trying without improvement of pain at home.  Patient states due to the pain he has been having difficulties with ambulation.  Of note, patient with recent left hip surgery and recently completed a stay at rehab.  Patient also with PEG tube for feeding after previously being diagnosed with aspiration pneumonia and inability to swallow.  At time of interview, patient denies current chest pain, shortness of breath, abdominal pain, nausea vomiting, fevers or chills.    PAST MEDICAL HISTORY  Past Medical History:    Anemia    Asthma (HCC)    Back problem    BPH (benign prostatic hyperplasia)    Calculus of kidney    Cataract    Coronary atherosclerosis    Diabetes (HCC)    ESRD (end stage renal disease) on dialysis (HCC)    Essential hypertension    High blood pressure    High cholesterol    History of blood transfusion    Hyperlipidemia    Neuropathy    hands and feet    KRAIG on CPAP    Renal disorder    Sleep apnea    Visual impairment    glasses    Vocal cord paralysis, unilateral partial        PAST SURGICAL HISTORY  Past Surgical History:   Procedure Laterality Date    Appendectomy          Back surgery      Neck/back -     Capsule endoscopy - internal referral      Cataract extraction Right 2022    PHACOEMULSIFICATION WITH POSTERIOR CHAMBER  INTRAOCULAR LENS, RIGHT EYE    Cataract extraction Left 12/21/2021    PHACOEMULSIFICATION WITH POSTERIOR CHAMBER INTRAOCULAR LENS, LEFT EYE    Cath bare metal stent (bms)      Cath drug eluting stent  05/21/2024    Successful IVUS guided PCI of the circumflex with a ABIMBOLA    Colonoscopy      Colonoscopy N/A 01/25/2021    Procedure: COLONOSCOPY;  Surgeon: Michael Gautam MD;  Location: [facility] ENDO    Colonoscopy N/A 06/03/2024    Procedure: COLONOSCOPY;  Surgeon: Gabriel Saldana MD;  Location: [facility] ENDOSCOPY    Colonoscopy N/A 06/15/2024    Procedure: COLONOSCOPY;  Surgeon: Carlyn Storey MD;  Location: [facility] ENDOSCOPY    Colonoscopy N/A 07/31/2024    Procedure: COLONOSCOPY;  Surgeon: Gabriel Saldana MD;  Location: [facility] ENDOSCOPY    Dialysis procedure  02/17/2023    right internal jugular tunneled dialysis catheter placement.    Hand/finger surgery unlisted      Accidental trauma    Spine surgery procedure unlisted      Total hip arthroplasty Left 10/04/2024    Left anterior total hip arthroplasty    Upper gi endoscopy,diagnosis         ALLERGIES   Adhesive tape and Dust mite extract    MEDICATIONS  Patient's Medications   New Prescriptions    No medications on file   Previous Medications    ACETAMINOPHEN 500 MG ORAL TAB    Take 1 tablet (500 mg total) by mouth every 6 (six) hours as needed for Pain.    ACETAMINOPHEN 500 MG ORAL TAB    1 tablet (500 mg total) by Per G Tube route in the morning and 1 tablet (500 mg total) before bedtime.    ALBUTEROL (2.5 MG/3ML) 0.083% INHALATION NEBU SOLN    Take 3 mL (2.5 mg total) by nebulization in the morning and 3 mL (2.5 mg total) before bedtime.    ALBUTEROL (2.5 MG/3ML) 0.083% INHALATION NEBU SOLN    Take 3 mL (2.5 mg total) by nebulization every 4 (four) hours as needed for Wheezing or Shortness of Breath.    ALBUTEROL (PROAIR HFA) 108 (90 BASE) MCG/ACT INHALATION AERO SOLN    Inhale 2 puffs into the lungs every 4 (four) hours as needed for Wheezing.    AMIODARONE 200 MG ORAL TAB     1 tablet (200 mg total) by Per G Tube route daily.    AMOXICILLIN CLAVULANATE 250-125 MG ORAL TAB    1 tablet (250 mg total) by Per G Tube route.    ASPIRIN 81 MG ORAL TAB EC    Take 1 tablet (81 mg total) by mouth daily.    ATORVASTATIN 40 MG ORAL TAB    1 tablet (40 mg total) by Per G Tube route nightly.    B COMPLEX-C-FOLIC ACID (RENAL MULTIVITAMIN FORMULA) ORAL TAB    1 tablet by Per G Tube route daily.    BUDESONIDE-FORMOTEROL FUMARATE (SYMBICORT) 160-4.5 MCG/ACT INHALATION AEROSOL    Inhale 2 puffs into the lungs 2 (two) times daily.    CARVEDILOL 25 MG ORAL TAB    1 tablet (25 mg total) by Per G Tube route 2 (two) times daily.    CETIRIZINE 10 MG ORAL TAB    Take 1 tablet (10 mg total) by mouth every other day.    CHOLECALCIFEROL 125 MCG (5000 UT) ORAL TAB    Take 1 tablet (5,000 Units total) by mouth 2 (two) times daily.    CLOPIDOGREL 75 MG ORAL TAB    Take 1 tablet (75 mg total) by mouth daily.    ELECTRONIC THERMOMETER ([employer] DIGITAL THERMOMETER TEMPLE) DOES NOT APPLY MISC    Check temperature    FELODIPINE ER 10 MG ORAL TABLET 24 HR    Take 1 tablet (10 mg total) by mouth daily.    FLUTICASONE PROPIONATE 50 MCG/ACT NASAL SUSPENSION    2 sprays by Nasal route daily.    GABAPENTIN 300 MG/6ML ORAL SOLUTION    300 mg by Peg Tube route in the morning and 300 mg before bedtime.    HYDROCODONE-ACETAMINOPHEN 5-325 MG ORAL TAB    1 tablet by Per G Tube route every 8 (eight) hours as needed.    INCONTINENCE SUPPLY DISPOSABLE (COMFORT PROTECT ADULT DIAPER/L) DOES NOT APPLY MISC    1 Application daily.    INSULIN GLARGINE (LANTUS SOLOSTAR) 100 UNIT/ML SUBCUTANEOUS SOLUTION PEN-INJECTOR    Inject 8 Units into the skin every morning.    INSULIN LISPRO, 1 UNIT DIAL, (HUMALOG KWIKPEN) 100 UNIT/ML SUBCUTANEOUS SOLUTION PEN-INJECTOR    Take subcutaneously 4 times daily before tube feedings per sliding scale. Max total dose of 20 units.    INSULIN PEN NEEDLE 32G X 4 MM DOES NOT APPLY MISC    Take as directed    INSULIN  PEN NEEDLE 33G X 4 MM DOES NOT APPLY MISC    1 each 4 (four) times daily.    LANSOPRAZOLE 30 MG ORAL TABLET DELAYED RELEASE DISPERSIBLE    1 tablet (30 mg total) by Per G Tube route every morning before breakfast.    LIDOCAINE 4 % EXTERNAL PATCH    Place 1 patch onto the skin daily.    LINAGLIPTIN (TRADJENTA) 5 MG ORAL TAB    1 tablet (5 mg total) by Per G Tube route daily.    POLYETHYLENE GLYCOL, PEG 3350, 17 G ORAL POWD PACK    daily as needed.    STARCH-MALTODEXTRIN (THICK-IT) ORAL POWD PACK    Use as directed   Modified Medications    No medications on file   Discontinued Medications    No medications on file       SOCIAL HISTORY  Social History     Socioeconomic History    Marital status:    Tobacco Use    Smoking status: Former     Current packs/day: 0.00     Average packs/day: 1 pack/day for 17.0 years (17.0 ttl pk-yrs)     Types: Cigarettes     Start date: 1964     Quit date: 1981     Years since quittin.9    Smokeless tobacco: Never   Vaping Use    Vaping status: Never Used   Substance and Sexual Activity    Alcohol use: No     Alcohol/week: 0.0 standard drinks of alcohol    Drug use: No    Sexual activity: Yes     Partners: Female       FAMILY HISTORY  Family History   Problem Relation Age of Onset    Cancer Father         Kidney    Breast Cancer Mother     Diabetes Mother     Diabetes Maternal Grandmother     Diabetes Maternal Grandfather     Heart Disorder Other         Uncle       PHYSICAL EXAM  Vital signs:  /43   Pulse 70   Temp 98.2 °F (36.8 °C) (Temporal)   Resp 18   SpO2 100%      GENERAL:  Awake and alert, in no acute distress.  HEART:  Regular rhythm.  Regular rate   LUNGS:  Air entry was minimally decreased.  No increased work of breathing or wheezes   ABDOMEN: Soft and non-tender.  PEG tube in place  NEUROLOGICAL: Tenderness to palpation over bilateral lower extremities.   PSYCHIATRIC: Normal mood      IMAGING  XR CHEST AP PORTABLE  (CPT=71045)    Result Date:  12/6/2024  CONCLUSION:  1. Interval worsening in the chest with mild cardiomegaly and mild to moderate pulmonary congestive change interstitial edema suggesting cardiac failure/fluid overload.  Questionable small right pleural effusion.  I cannot definitely exclude underlying pneumonia/inflammatory process.  Correlate clinically and follow-up studies are advised.    Dictated by (CST): Ramón Schmidt MD on 12/06/2024 at 2:15 PM     Finalized by (CST): Ramón Schmidt MD on 12/06/2024 at 2:16 PM           Data:  Recent Labs   Lab 11/30/24  0205 12/03/24  0907 12/06/24  1359   RBC 2.49* 2.46* 2.57*   HGB 7.5* 7.3* 7.7*   HCT 23.2* 23.2* 24.1*   MCV 93.2 94.3 93.8   MCH 30.1 29.7 30.0   MCHC 32.3 31.5 32.0   RDW 16.2* 16.0* 16.4*   NEPRELIM 10.62* 8.70* 8.56*   WBC 13.8* 11.3* 11.3*   .0 244.0 272.0     Recent Labs   Lab 11/30/24  0205 12/03/24  0907 12/06/24  1359   * 170* 196*   BUN 19 29* 37*   CREATSERUM 2.69* 3.32* 4.04*   CA 9.4 9.6 9.5   ALB  --  3.4 3.2    135* 133*   K 3.4* 3.5 4.0   CL 96* 95* 92*   CO2 35.0* 37.0* 35.0*   ALKPHO  --  124* 123*   AST  --  71* 42*   ALT  --  57* 38   BILT  --  0.2 0.2   TP  --  7.1 6.9       ASSESSMENT/PLAN      Peripheral polyneuropathy  -Patient p/w bilateral lower extremity pain  -Significantly worse over the past 3 to 4 days.  -No real improvement with gabapentin at home  -Difficulties with ambulation due to pain  -Pain control as able  -PT/OT  -Continue to monitor    ESRD on HD  -Normally receives dialysis on Monday Wednesday Friday.  Last session was on Wednesday.  -Nephrology consulted, further HD per nephrology.    Anemia of chronic disease  -Likely secondary to ESRD as above  -Hemoglobin noted to be 7.7  -Continue to monitor    Type 2 DM   - Monitoring Blood glucose with POC checks  - SSI to cover hyperglycemia  - Hypoglycemia protocol  - Will monitor and adjust agents as needed.      Other problems  -KRAIG  -Hypertension  -Chronic dCHF  -Pulm  HTN    Plan of care discussed with patient and wife at bedside.  Discussed with ED physician and RN. Decision made that pt needs hospitalization for further management/monitoring.      Abdifatah Mesa MD    This note was prepared using Dragon Medical voice recognition dictation software. As a result errors may occur. When identified these errors have been corrected. While every attempt is made to correct errors during dictation discrepancies may still exist

## 2024-12-06 NOTE — ED QUICK NOTES
Orders for admission, patient is aware of plan and ready to go upstairs. Any questions, please call ED RN robert at extension [phone].     Patient Covid vaccination status: Fully vaccinated     COVID Test Ordered in ED: None    COVID Suspicion at Admission: N/A    Running Infusions:  None    Mental Status/LOC at time of transport: aaox4    Other pertinent information:  gtube, bed bound, dialysis PT- R arm restriction  CIWA score: N/A   NIH score:  N/A

## 2024-12-06 NOTE — TELEPHONE ENCOUNTER
To  - called spouse per HIPAA and relayed STEPH message -verbalized understanding. She does nothave large diapers or bed pads - she always ordered them form [employer] , she does not know where she can get these and will talk to  about that too

## 2024-12-06 NOTE — RESPIRATORY THERAPY NOTE
KRAIG ASSESSMENT:    Pt does have a previous diagnosis of KRAIG. Pt does routinely use a CPAP device at home. This pt is suspected to be at high risk for KRAIG.    CPAP INITIATION:    Pt to be placed on CPAP: no  Pt family refused: yes

## 2024-12-06 NOTE — ED PROVIDER NOTES
Patient Seen in: [facility] Emergency Department      History     Chief Complaint   Patient presents with    Foot Pain     Stated Complaint: FOOT PAIN    Subjective:   HPI      79-year-old male who was in the hospital then in rehab for a month or just came home here with his wife declining not able to get up and move around not eating or drinking well and complaining of worsening bilateral foot pain.  He has been diagnosed with neuropathy.  He is on gabapentin.  No reported fever.  His wife tries to care for him at home with the help of one of the patient's daughters.    Objective:     Past Medical History:    Anemia    Asthma ([facility])    Back problem    BPH (benign prostatic hyperplasia)    Calculus of kidney    Cataract    Coronary atherosclerosis    Diabetes (HCC)    ESRD (end stage renal disease) on dialysis (HCC)    Essential hypertension    High blood pressure    High cholesterol    History of blood transfusion    Hyperlipidemia    Neuropathy    hands and feet    KRAIG on CPAP    Renal disorder    Sleep apnea    Visual impairment    glasses    Vocal cord paralysis, unilateral partial              No pertinent past surgical history.              No pertinent social history.                Physical Exam     ED Triage Vitals [12/06/24 1222]   /52   Pulse 69   Resp 18   Temp 98.2 °F (36.8 °C)   Temp src Temporal   SpO2 100 %   O2 Device None (Room air)       Current Vitals:   Vital Signs  BP: 152/58  Pulse: 61  Resp: 16  Temp: 97.6 °F (36.4 °C)  Temp src: Oral  MAP (mmHg): 87    Oxygen Therapy  SpO2: 96 %  O2 Device: None (Room air)        Physical Exam  Constitutional: Oriented to person, place, and time.  Appears well-developed. No distress.   Head: Normocephalic and atraumatic.   Eyes: Conjunctivae are normal. Pupils are equal, round, and reactive to light.   Neck: Normal range of motion. Neck supple.   Cardiovascular: Normal rate, regular rhythm and intact distal pulses.    Pulmonary/Chest: Effort  normal. No respiratory distress.   Abdominal: Soft. There is no tenderness. There is no guarding.   Musculoskeletal: Mild chronic appearing edema without significant pitting to both lower extremities.  Dorsalis pedis pulses strong.  No significant skin breakdown.  Neurological: Alert and oriented to person, place, and time.   Skin: Skin is warm and dry.   Psychiatric: Normal mood and affect.  Behavior is normal.   Nursing note and vitals reviewed.    Differential diagnosis includes failure to thrive and generalized weakness and deconditioning, chronic peripheral neuropathy, fluid overload and heart failure, anemia.      ED Course     Labs Reviewed   BASIC METABOLIC PANEL (8) - Abnormal; Notable for the following components:       Result Value    Glucose 196 (*)     Sodium 133 (*)     Chloride 92 (*)     CO2 35.0 (*)     BUN 37 (*)     Creatinine 4.04 (*)     BUN/CREA Ratio 9.2 (*)     eGFR-Cr 15 (*)     All other components within normal limits   CBC WITH DIFFERENTIAL WITH PLATELET - Abnormal; Notable for the following components:    WBC 11.3 (*)     RBC 2.57 (*)     HGB 7.7 (*)     HCT 24.1 (*)     RDW-SD 55.0 (*)     RDW 16.4 (*)     Neutrophil Absolute Prelim 8.56 (*)     Neutrophil Absolute 8.56 (*)     All other components within normal limits   HEPATIC FUNCTION PANEL (7) - Abnormal; Notable for the following components:    AST 42 (*)     Alkaline Phosphatase 123 (*)     All other components within normal limits   MAGNESIUM - Normal            XR CHEST AP PORTABLE  (CPT=71045)    Result Date: 12/6/2024  PROCEDURE: XR CHEST AP PORTABLE  (CPT=71045) TIME: 1356 hours.   COMPARISON: [facility], XR CHEST AP PORTABLE (CPT=71045), 11/30/2024, 2:52 AM.  INDICATIONS: Chest pain x1 day.  TECHNIQUE:   Single view.   FINDINGS:  CARDIAC/VASC: Mild cardiomegaly with mild to moderate pulmonary congestive change interstitial edema compatible cardiac failure/fluid overload. MEDIAST/SHAMA:   No visible mass or  adenopathy. LUNGS/PLEURA: Mild-to-moderate pulmonary congestive change interstitial edema suggesting cardiac failure/fluid overload.  Can not definitely exclude underlying inflammatory process/pneumonia.  Blunting of the right costophrenic angle may suggest a small right pleural effusion.  Correlate clinically and follow-up studies are advised. BONES: No fracture or visible bony lesion. OTHER: Negative.          CONCLUSION:  1. Interval worsening in the chest with mild cardiomegaly and mild to moderate pulmonary congestive change interstitial edema suggesting cardiac failure/fluid overload.  Questionable small right pleural effusion.  I cannot definitely exclude underlying pneumonia/inflammatory process.  Correlate clinically and follow-up studies are advised.    Dictated by (CST): Ramón Schmidt MD on 12/06/2024 at 2:15 PM     Finalized by (CST): Ramón Schmidt MD on 12/06/2024 at 2:16 PM             [facility]          Admission disposition: 12/6/2024  4:00 PM           Medical Decision Making  Patient has been stable here.  Got a little pain medication but did not help much.  The wife really cannot care for him at home at this time.  It is dangerous for him to go home.  He may need more permanent placement and a care plan.  He will be admitted by the hospitalist.  Nephrology will be consulted for nonemergent hemodialysis.  He is stable.    Problems Addressed:  ESRD on hemodialysis (HCC): chronic illness or injury  Peripheral polyneuropathy: chronic illness or injury with exacerbation, progression, or side effects of treatment    Amount and/or Complexity of Data Reviewed  Labs: ordered. Decision-making details documented in ED Course.  Radiology: ordered and independent interpretation performed. Decision-making details documented in ED Course.     Details:  by my gross review of the chest x-ray there appears to be evidence of a pulmonary edema and heart failure pattern    Risk  Decision regarding  hospitalization.        Disposition and Plan     Clinical Impression:  1. Peripheral polyneuropathy    2. ESRD on hemodialysis (HCC)         Disposition:  Admit  12/6/2024  4:00 pm    Follow-up:  No follow-up provider specified.  We recommend that you schedule follow up care with a primary care provider within the next three months to obtain basic health screening including reassessment of your blood pressure.      Medications Prescribed:  Current Discharge Medication List              Supplementary Documentation:         Hospital Problems       Present on Admission  Date Reviewed: 12/3/2024            ICD-10-CM Noted POA    * (Principal) Peripheral polyneuropathy G62.9 12/6/2024 Unknown    ESRD on hemodialysis (HCC) N18.6, Z99.2 7/30/2024 Unknown

## 2024-12-06 NOTE — TELEPHONE ENCOUNTER
Called patient and spouse to follow up. Daughter answered phone and stated her mom was taking patient to the ER.  Will follow up later.     Need to review insulin dosing instructions. Novolog was changed to humalog per insurance coverage.   Take humalog 10-15 minutes before feedings. Use scale below.   130-150   1 unit  151-200   2 units  201-250   3 units  251-300   4 units  > 301   5 units    Gvoke prescription was sent to pharmacy.   Patient has a G-tube and having 3-4 feedings per day, nothing by mouth.

## 2024-12-06 NOTE — ED INITIAL ASSESSMENT (HPI)
Patient to ed via ems co of bilateral foot pain recently discharged from rehab for hip fx. Patient dialysis [facility] last dialysis x Wednesday

## 2024-12-07 LAB
ALBUMIN SERPL-MCNC: 3.3 G/DL (ref 3.2–4.8)
ALBUMIN/GLOB SERPL: 1 {RATIO} (ref 1–2)
ALP LIVER SERPL-CCNC: 116 U/L
ALT SERPL-CCNC: 29 U/L
ANION GAP SERPL CALC-SCNC: 6 MMOL/L (ref 0–18)
AST SERPL-CCNC: 32 U/L (ref ?–34)
BASOPHILS # BLD AUTO: 0.09 X10(3) UL (ref 0–0.2)
BASOPHILS NFR BLD AUTO: 0.8 %
BILIRUB SERPL-MCNC: 0.3 MG/DL (ref 0.2–1.1)
BUN BLD-MCNC: 37 MG/DL (ref 9–23)
BUN/CREAT SERPL: 8.4 (ref 10–20)
CALCIUM BLD-MCNC: 9.6 MG/DL (ref 8.7–10.4)
CHLORIDE SERPL-SCNC: 95 MMOL/L (ref 98–112)
CO2 SERPL-SCNC: 32 MMOL/L (ref 21–32)
CREAT BLD-MCNC: 4.43 MG/DL
DEPRECATED RDW RBC AUTO: 54.6 FL (ref 35.1–46.3)
EGFRCR SERPLBLD CKD-EPI 2021: 13 ML/MIN/1.73M2 (ref 60–?)
EOSINOPHIL # BLD AUTO: 0.26 X10(3) UL (ref 0–0.7)
EOSINOPHIL NFR BLD AUTO: 2.3 %
ERYTHROCYTE [DISTWIDTH] IN BLOOD BY AUTOMATED COUNT: 16.4 % (ref 11–15)
GLOBULIN PLAS-MCNC: 3.4 G/DL (ref 2–3.5)
GLUCOSE BLD-MCNC: 129 MG/DL (ref 70–99)
GLUCOSE BLDC GLUCOMTR-MCNC: 123 MG/DL (ref 70–99)
GLUCOSE BLDC GLUCOMTR-MCNC: 137 MG/DL (ref 70–99)
GLUCOSE BLDC GLUCOMTR-MCNC: 143 MG/DL (ref 70–99)
GLUCOSE BLDC GLUCOMTR-MCNC: 189 MG/DL (ref 70–99)
HCT VFR BLD AUTO: 25.3 %
HGB BLD-MCNC: 8.2 G/DL
IMM GRANULOCYTES # BLD AUTO: 0.05 X10(3) UL (ref 0–1)
IMM GRANULOCYTES NFR BLD: 0.5 %
LYMPHOCYTES # BLD AUTO: 1.63 X10(3) UL (ref 1–4)
LYMPHOCYTES NFR BLD AUTO: 14.7 %
MAGNESIUM SERPL-MCNC: 2.4 MG/DL (ref 1.6–2.6)
MCH RBC QN AUTO: 30.4 PG (ref 26–34)
MCHC RBC AUTO-ENTMCNC: 32.4 G/DL (ref 31–37)
MCV RBC AUTO: 93.7 FL
MONOCYTES # BLD AUTO: 0.72 X10(3) UL (ref 0.1–1)
MONOCYTES NFR BLD AUTO: 6.5 %
NEUTROPHILS # BLD AUTO: 8.36 X10 (3) UL (ref 1.5–7.7)
NEUTROPHILS # BLD AUTO: 8.36 X10(3) UL (ref 1.5–7.7)
NEUTROPHILS NFR BLD AUTO: 75.2 %
OSMOLALITY SERPL CALC.SUM OF ELEC: 286 MOSM/KG (ref 275–295)
PLATELET # BLD AUTO: 254 10(3)UL (ref 150–450)
POTASSIUM SERPL-SCNC: 3.9 MMOL/L (ref 3.5–5.1)
PROT SERPL-MCNC: 6.7 G/DL (ref 5.7–8.2)
RBC # BLD AUTO: 2.7 X10(6)UL
SODIUM SERPL-SCNC: 133 MMOL/L (ref 136–145)
WBC # BLD AUTO: 11.1 X10(3) UL (ref 4–11)

## 2024-12-07 PROCEDURE — 99222 1ST HOSP IP/OBS MODERATE 55: CPT | Performed by: STUDENT IN AN ORGANIZED HEALTH CARE EDUCATION/TRAINING PROGRAM

## 2024-12-07 PROCEDURE — 99233 SBSQ HOSP IP/OBS HIGH 50: CPT | Performed by: HOSPITALIST

## 2024-12-07 PROCEDURE — 99223 1ST HOSP IP/OBS HIGH 75: CPT | Performed by: INTERNAL MEDICINE

## 2024-12-07 RX ORDER — GABAPENTIN 250 MG/5ML
100 SOLUTION ORAL 2 TIMES DAILY
Status: DISCONTINUED | OUTPATIENT
Start: 2024-12-07 | End: 2024-12-13

## 2024-12-07 RX ORDER — SODIUM BICARBONATE 650 MG/1
325 TABLET ORAL AS NEEDED
Status: DISCONTINUED | OUTPATIENT
Start: 2024-12-07 | End: 2024-12-23

## 2024-12-07 RX ORDER — HYDROMORPHONE HYDROCHLORIDE 1 MG/ML
0.1 INJECTION, SOLUTION INTRAMUSCULAR; INTRAVENOUS; SUBCUTANEOUS EVERY 2 HOUR PRN
Status: DISCONTINUED | OUTPATIENT
Start: 2024-12-07 | End: 2024-12-23

## 2024-12-07 RX ORDER — HYDROMORPHONE HYDROCHLORIDE 1 MG/ML
0.2 INJECTION, SOLUTION INTRAMUSCULAR; INTRAVENOUS; SUBCUTANEOUS EVERY 2 HOUR PRN
Status: DISCONTINUED | OUTPATIENT
Start: 2024-12-07 | End: 2024-12-23

## 2024-12-07 RX ORDER — ALBUMIN (HUMAN) 12.5 G/50ML
25 SOLUTION INTRAVENOUS
Status: ACTIVE | OUTPATIENT
Start: 2024-12-07 | End: 2024-12-09

## 2024-12-07 RX ORDER — HYDROMORPHONE HYDROCHLORIDE 1 MG/ML
0.4 INJECTION, SOLUTION INTRAMUSCULAR; INTRAVENOUS; SUBCUTANEOUS EVERY 2 HOUR PRN
Status: DISCONTINUED | OUTPATIENT
Start: 2024-12-07 | End: 2024-12-23

## 2024-12-07 RX ORDER — LIDOCAINE/PRILOCAINE 2.5 %-2.5%
CREAM (GRAM) TOPICAL AS NEEDED
Status: DISCONTINUED | OUTPATIENT
Start: 2024-12-07 | End: 2024-12-12

## 2024-12-07 RX ORDER — METOPROLOL TARTRATE 1 MG/ML
2.5 INJECTION, SOLUTION INTRAVENOUS AS NEEDED
Status: DISCONTINUED | OUTPATIENT
Start: 2024-12-07 | End: 2024-12-22

## 2024-12-07 RX ORDER — AMITRIPTYLINE HYDROCHLORIDE 10 MG/1
10 TABLET ORAL NIGHTLY
Status: DISCONTINUED | OUTPATIENT
Start: 2024-12-07 | End: 2024-12-07

## 2024-12-07 RX ORDER — NORTRIPTYLINE HYDROCHLORIDE 10 MG/1
10 CAPSULE ORAL NIGHTLY
Status: DISCONTINUED | OUTPATIENT
Start: 2024-12-07 | End: 2024-12-18

## 2024-12-07 RX ORDER — HEPARIN SODIUM 1000 [USP'U]/ML
1.5 INJECTION, SOLUTION INTRAVENOUS; SUBCUTANEOUS
Status: DISCONTINUED | OUTPATIENT
Start: 2024-12-07 | End: 2024-12-13

## 2024-12-07 RX ORDER — METOPROLOL TARTRATE 1 MG/ML
5 INJECTION, SOLUTION INTRAVENOUS AS NEEDED
Status: DISCONTINUED | OUTPATIENT
Start: 2024-12-07 | End: 2024-12-22

## 2024-12-07 RX ORDER — DULOXETIN HYDROCHLORIDE 20 MG/1
20 CAPSULE, DELAYED RELEASE ORAL DAILY
Status: DISCONTINUED | OUTPATIENT
Start: 2024-12-07 | End: 2024-12-07

## 2024-12-07 NOTE — CHRONIC PAIN
[facility]  Report of Consultation    Ollie Hernández Patient Status:  Inpatient    1945 MRN [id number]   Location [facility] 5SW/SE Attending Dee Joyce,*   Hosp Day # 1 PCP Wili Parmar MD     Date of Admission:  2024  Date of Consult:  2024    Reason for Consultation:  Neuropathy     History of Present Illness:  Ollie Hernández is a a(n) 79 year old male with multiple medical co-morbidities most notable for SKD on HD, DM, CAD, Anemia, KRAIG, Pulm HTN and CHF admitted for worsening bilat feet burning pain.      History:  Past Medical History:    Anemia    Asthma (HCC)    Back problem    BPH (benign prostatic hyperplasia)    Calculus of kidney    Cataract    Coronary atherosclerosis    Diabetes (HCC)    ESRD (end stage renal disease) on dialysis (HCC)    Essential hypertension    High blood pressure    High cholesterol    History of blood transfusion    Hyperlipidemia    Neuropathy    hands and feet    KRAIG on CPAP    Renal disorder    Sleep apnea    Visual impairment    glasses    Vocal cord paralysis, unilateral partial     Past Surgical History:   Procedure Laterality Date    Appendectomy          Back surgery      Neck/back -     Capsule endoscopy - internal referral      Cataract extraction Right 2022    PHACOEMULSIFICATION WITH POSTERIOR CHAMBER INTRAOCULAR LENS, RIGHT EYE    Cataract extraction Left 2021    PHACOEMULSIFICATION WITH POSTERIOR CHAMBER INTRAOCULAR LENS, LEFT EYE    Cath bare metal stent (bms)      Cath drug eluting stent  2024    Successful IVUS guided PCI of the circumflex with a ABIMBOLA    Colonoscopy      Colonoscopy N/A 2021    Procedure: COLONOSCOPY;  Surgeon: Michael Gautam MD;  Location: [facility] ENDO    Colonoscopy N/A 2024    Procedure: COLONOSCOPY;  Surgeon: Gabriel Saldana MD;  Location: [facility] ENDOSCOPY    Colonoscopy N/A 06/15/2024    Procedure: COLONOSCOPY;  Surgeon: Carlyn Storey MD;   Location: [facility] ENDOSCOPY    Colonoscopy N/A 07/31/2024    Procedure: COLONOSCOPY;  Surgeon: Gabriel Saldana MD;  Location: [facility] ENDOSCOPY    Dialysis procedure  02/17/2023    right internal jugular tunneled dialysis catheter placement.    Hand/finger surgery unlisted      Accidental trauma    Spine surgery procedure unlisted      Total hip arthroplasty Left 10/04/2024    Left anterior total hip arthroplasty    Upper gi endoscopy,diagnosis       Family History   Problem Relation Age of Onset    Cancer Father         Kidney    Breast Cancer Mother     Diabetes Mother     Diabetes Maternal Grandmother     Diabetes Maternal Grandfather     Heart Disorder Other         Uncle      reports that he quit smoking about 43 years ago. His smoking use included cigarettes. He started smoking about 60 years ago. He has a 17 pack-year smoking history. He has never used smokeless tobacco. He reports that he does not drink alcohol and does not use drugs.    Allergies:  Allergies[1]    Medications:    Current Facility-Administered Medications:     dextrose 10% infusion (TPN no rate), , Intravenous, Continuous PRN    pancrelipase (Lip-Prot-Amyl) (Zenpep) DR particles cap 10,000 Units, 10,000 Units, Per G Tube, PRN **AND** sodium bicarbonate tab 325 mg, 325 mg, Oral, PRN    lidocaine-menthol 4-1 % patch 1 patch, 1 patch, Transdermal, Daily    metoprolol (Lopressor) 5 mg/5mL injection 5 mg, 5 mg, Intravenous, PRN **OR** metoprolol (Lopressor) 5 mg/5mL injection 2.5 mg, 2.5 mg, Intravenous, PRN    heparin (Porcine) 1000 UNIT/ML injection 1,500 Units, 1.5 mL, Intravenous, PRN Dialysis    lidocaine-prilocaine (Emla) 2.5-2.5 % cream, , Topical, PRN    sodium chloride 0.9 % IV bolus 100 mL, 100 mL, Intravenous, Q30 Min PRN **AND** albumin human (Albumin) 25% injection 25 g, 25 g, Intravenous, PRN Dialysis    [START ON 12/9/2024] epoetin donna (Epogen, Procrit) 39026 UNIT/ML injection 10,000 Units, 10,000 Units, Subcutaneous, Once per day on  Monday Wednesday Friday    gabapentin (Neurontin) 250 MG/5ML oral solution 100 mg, 100 mg, Per G Tube, BID    nortriptyline (Pamelor) cap 10 mg, 10 mg, Oral, Nightly    HYDROmorphone (Dilaudid) 1 MG/ML injection 0.1 mg, 0.1 mg, Intravenous, Q2H PRN **OR** HYDROmorphone (Dilaudid) 1 MG/ML injection 0.2 mg, 0.2 mg, Intravenous, Q2H PRN **OR** HYDROmorphone (Dilaudid) 1 MG/ML injection 0.4 mg, 0.4 mg, Intravenous, Q2H PRN    amiodarone (Pacerone) tab 200 mg, 200 mg, Per G Tube, Daily    aspirin DR tab 81 mg, 81 mg, Oral, Daily    atorvastatin (Lipitor) tab 40 mg, 40 mg, Per G Tube, Nightly    carvedilol (Coreg) tab 25 mg, 25 mg, Per G Tube, BID    clopidogrel (Plavix) tab 75 mg, 75 mg, Oral, Daily    fluticasone propionate (Flonase) 50 MCG/ACT nasal suspension 2 spray, 2 spray, Nasal, Daily    insulin degludec (Tresiba) 100 units/mL flextouch 5 Units, 5 Units, Subcutaneous, Nightly    lansoprazole (Prevacid Solutab) disintegrating tab 30 mg, 30 mg, Per G Tube, QAM AC    glucose (Dex4) 15 GM/59ML oral liquid 15 g, 15 g, Oral, Q15 Min PRN **OR** glucose (Glutose) 40% oral gel 15 g, 15 g, Oral, Q15 Min PRN **OR** glucose-vitamin C (Dex-4) chewable tab 4 tablet, 4 tablet, Oral, Q15 Min PRN **OR** dextrose 50% injection 50 mL, 50 mL, Intravenous, Q15 Min PRN **OR** glucose (Dex4) 15 GM/59ML oral liquid 30 g, 30 g, Oral, Q15 Min PRN **OR** glucose (Glutose) 40% oral gel 30 g, 30 g, Oral, Q15 Min PRN **OR** glucose-vitamin C (Dex-4) chewable tab 8 tablet, 8 tablet, Oral, Q15 Min PRN    heparin (Porcine) 5000 UNIT/ML injection 5,000 Units, 5,000 Units, Subcutaneous, Q8H STEPHANIE    acetaminophen (Tylenol Extra Strength) tab 500 mg, 500 mg, Oral, Q4H PRN    acetaminophen (Tylenol) tab 650 mg, 650 mg, Oral, Q4H PRN **OR** HYDROcodone-acetaminophen (Norco) 5-325 MG per tab 1 tablet, 1 tablet, Oral, Q4H PRN **OR** [DISCONTINUED] HYDROcodone-acetaminophen (Norco) 5-325 MG per tab 2 tablet, 2 tablet, Oral, Q4H PRN    polyethylene glycol  (PEG 3350) (Miralax) 17 g oral packet 17 g, 17 g, Oral, Daily PRN    sennosides (Senokot) tab 17.2 mg, 17.2 mg, Oral, Nightly PRN    bisacodyl (Dulcolax) 10 MG rectal suppository 10 mg, 10 mg, Rectal, Daily PRN    insulin aspart (NovoLOG) 100 Units/mL FlexPen 1-7 Units, 1-7 Units, Subcutaneous, TID CC and HS    Review of Systems:  Pertinent items are noted in HPI.    Physical Exam:  Blood pressure 127/61, pulse 67, temperature 99.1 °F (37.3 °C), temperature source Axillary, resp. rate 18, height 5' 4\" (1.626 m), weight 148 lb 9.6 oz (67.4 kg), SpO2 94%.    Laboratory Data:  Lab Results   Component Value Date    WBC 11.1 12/07/2024    HGB 8.2 12/07/2024    HCT 25.3 12/07/2024    .0 12/07/2024    CREATSERUM 4.43 12/07/2024    BUN 37 12/07/2024     12/07/2024    K 3.9 12/07/2024    CL 95 12/07/2024    CO2 32.0 12/07/2024     12/07/2024    CA 9.6 12/07/2024    ALB 3.3 12/07/2024    ALKPHO 116 12/07/2024    BILT 0.3 12/07/2024    TP 6.7 12/07/2024    AST 32 12/07/2024    ALT 29 12/07/2024    MG 2.4 12/07/2024       Impression:  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)    Atherosclerosis of native arteries of extremities with intermittent claudication, bilateral legs (HCC)    Primary hypertension    Smokers' cough (HCC)    Unstable angina (HCC)    Lower GI bleed    ESRD (end stage renal disease) on dialysis (HCC)    PAF (paroxysmal atrial fibrillation) (HCC)    AVM (arteriovenous  malformation) of colon    ABLA (acute blood loss anemia)    Rectal bleeding    Anticoagulated    Antiplatelet or antithrombotic long-term use    Lower GI bleeding    ESRD on hemodialysis (HCC)    GI bleed    Closed displaced midcervical fracture of left femur with delayed healing    ESRD (end stage renal disease) (HCC)    Closed fracture of left hip (HCC)    Hyperphosphatemia    Respiratory failure (HCC)    Anemia    Palliative care by specialist    Thrombocytopenia (HCC)    Peripheral polyneuropathy       Recommendations:  Patient very drowsy after norco, recommend limiting dose to max 5/325 Q4   Renal dosing of gabapentin, cut down to 100 BID   Switch to nortriptyline   Pain service will continue to follow, please call with questions     Thank you for allowing me to participate in the care of your patient.    Time spent: 30    Anthony Mahoney MD, 12/07/24, 5:18 PM,e           [1]   Allergies  Allergen Reactions    Adhesive Tape OTHER (SEE COMMENTS)     Severe rashes    Dust Mite Extract RASH

## 2024-12-07 NOTE — PROGRESS NOTES
[facility]  part of [facility]    Progress Note    Ollie Hernández Patient Status:  Inpatient    1945 MRN [id number]   Location [facility] 5SW/SE Attending Dee Joyce,*   Hosp Day # 1 PCP Wili Parmar MD     Chief Complaint: foot pain    Subjective:     Unable to perform ROS: Acuity of condition       Objective:   Blood pressure 110/48, pulse 66, temperature 98.7 °F (37.1 °C), temperature source Oral, resp. rate 18, height 5' 4\" (1.626 m), weight 148 lb 9.6 oz (67.4 kg), SpO2 93%.  Physical Exam  Vitals and nursing note reviewed.   Constitutional:       General: He is not in acute distress.     Appearance: He is ill-appearing. He is not toxic-appearing or diaphoretic.   Cardiovascular:      Rate and Rhythm: Normal rate.      Pulses: Normal pulses.   Pulmonary:      Breath sounds: Rhonchi present. No wheezing.   Abdominal:      General: Bowel sounds are normal.      Palpations: Abdomen is soft.   Skin:     General: Skin is warm and dry.      Capillary Refill: Capillary refill takes less than 2 seconds.   Neurological:      General: No focal deficit present.      Mental Status: He is alert.      Comments: sleepy   Psychiatric:         Behavior: Behavior normal.         Results:   Lab Results   Component Value Date    WBC 11.1 (H) 2024    HGB 8.2 (L) 2024    HCT 25.3 (L) 2024    .0 2024    CREATSERUM 4.43 (H) 2024    BUN 37 (H) 2024     (L) 2024    K 3.9 2024    CL 95 (L) 2024    CO2 32.0 2024     (H) 2024    CA 9.6 2024    ALB 3.3 2024    ALKPHO 116 2024    BILT 0.3 2024    TP 6.7 2024    AST 32 2024    ALT 29 2024    PTT 30.1 2024    INR 1.45 (H) 2024    T4F 0.9 2022    TSH 2.844 2024     (H) 2024    PSA 2.94 10/20/2021    DDIMER 6.07 (H) 2024    ESRML 10 2024    CRP 1.30 (H) 2024     MG 2.4 12/07/2024    PHOS 3.0 10/22/2024    TROP <0.045 07/25/2019    TROPHS 52 12/03/2024     08/05/2023    B12 672 07/04/2024       XR CHEST AP PORTABLE  (CPT=71045)    Result Date: 12/6/2024  CONCLUSION:  1. Interval worsening in the chest with mild cardiomegaly and mild to moderate pulmonary congestive change interstitial edema suggesting cardiac failure/fluid overload.  Questionable small right pleural effusion.  I cannot definitely exclude underlying pneumonia/inflammatory process.  Correlate clinically and follow-up studies are advised.    Dictated by (CST): Ramón Schmidt MD on 12/06/2024 at 2:15 PM     Finalized by (CST): Ramón Schmidt MD on 12/06/2024 at 2:16 PM               Assessment & Plan:     Peripheral polyneuropathy; check rpr; thyroid; elctropheresis; b12 ok 7/24  -Patient p/w bilateral lower extremity pain  -Significantly worse over the past 3 to 4 days.  -No real improvement with gabapentin at home  -Difficulties with ambulation because of pain  -Pain control as able; pt screaming in pain or asleep; pain clinic called  -PT/OT  -Continue to monitor     ESRD on HD  -Normally receives dialysis on Monday Wednesday Friday.  Last session was on Wednesday.  -Nephrology consulted, further HD per nephrology.     Anemia of chronic disease  -Likely secondary to ESRD as above  -Hemoglobin noted to be 7.7  -Continue to monitor     Type 2 DM   - Monitoring Blood glucose with POC checks  - SSI to cover hyperglycemia  - Hypoglycemia protocol  - Will monitor and adjust agents as needed.      -KRAIG  -Hypertension  -Chronic dCHF  -Pulm HTN    Afib has watchman ? If can have mri    Severe ls disk disease in 2023       Patient will require inpatient services that will reasonably be expected to span two midnight's based on the clinical documentation in H+P.   Based on patients current state of illness, I anticipate that, after discharge, patient will require TBD.  Complex mdm; coordinating care with nurse/pain  clinic and counseling pt and with his permission his wife in room about labs    BRITTANY WAY MD

## 2024-12-07 NOTE — SLP NOTE
ADULT SWALLOWING EVALUATION    ASSESSMENT    ASSESSMENT/OVERALL IMPRESSION:  Patient is a 79 year old male with increased weakness and decline. He has been NPO due to severity of dysphagia. He does have PEG tube. Wife reports he has a HH SLP. Unable to see most recent results of VFSS done at [facility]. His wife reports that it was recommended to initiate po trials with SLP only.   Orders were received for bedside swallowing evaluation.   Oral phase revealed functional oral acceptance, retrieval, and transit with timely and complete clearing of oral cavity.  Pharyngeal phase revealed an apparent timely initiation of pharyngeal phase with weak and uncoordinated hyolaryngeal elevation on palpation with clinical s/s of aspiration.   Recommend continue NPO status. Recommend to continue to follow with HH SLP and follow their treatment plan. Skilled IP SLP services are not warranted as patient receiving care in home health setting and to continue with HH SLP.   Reviewed recommendations with patient's wife who was in agreement.          RECOMMENDATIONS   Diet Recommendations - Solids: NPO  Diet Recommendations - Liquids: NPO       Medication Administration Recommendations: Non-oral  Treatment Plan/Recommendations:  (Continue with home health SLP)    HISTORY   MEDICAL HISTORY  Reason for Referral: R/O aspiration (Patient with PEG tube)    Problem List  Principal Problem:    Peripheral polyneuropathy  Active Problems:    ESRD on hemodialysis ([facility])      Past Medical History  Past Medical History:    Anemia    Asthma ([facility])    Back problem    BPH (benign prostatic hyperplasia)    Calculus of kidney    Cataract    Coronary atherosclerosis    Diabetes ([facility])    ESRD (end stage renal disease) on dialysis ([facility])    Essential hypertension    High blood pressure    High cholesterol    History of blood transfusion    Hyperlipidemia    Neuropathy    hands and feet    KRAIG on CPAP    Renal disorder    Sleep apnea    Visual impairment    glasses     Vocal cord paralysis, unilateral partial       Prior Living Situation: Home with spouse  Diet Prior to Admission: NPO  Precautions: Aspiration    Patient/Family Goals: To eat and drink    SWALLOWING HISTORY  Current Diet Consistency: NPO  Dysphagia History:   Imaging Results: VFSS on 10/15/24:Tongue-base retraction reduced all consistencies with premature spillage of bolus into valleculae and pyriform sinus. Hyolarygneal excursion and epiglottic inversion/retroversion significantly reduced all trials. Pt with aspiration DURING and AFTER swallows all liquids. Pt unable to protect airway during the swallow- efforts to clear the moderate-severe pharyngeal retention with multiple swallows resulted into additional spillage into airway. Weak cough did not clear any of the aspirated material. No pureed trials presented; deemed unsafe given aspiration on the three controlled liquid trials and pharyngeal retention accumulating as exam progressed. UES appeared impaired with evidence of retrograde bolus flow. Collaborated with RN regarding Pt's swallowing plan of care. Pt with expectoration intermittently of barium phlegm post exam. Rec DOWNGRADE to NPO status/non-oral means of nutrition/hydration.         Per radiologist, Dr. Bolden, Pt with diffuse DISH; prominent proliferative changes fusing the spine over 4 or more vertebral levels. Per SLP, this structural change impeded flow of bolus and contributed to pharyngeal residue and this reducing Pt's ability to to protect airway.       SUBJECTIVE       OBJECTIVE   ORAL MOTOR EXAMINATION  Dentition: Natural;Functional  Symmetry: Within Functional Limits  Strength: Within Functional Limits  Tone: Within Functional Limits  Range of Motion: Within Functional Limits  Rate of Motion: Within Functional Limits    Voice Quality: Clear  Respiratory Status: Unlabored  Consistencies Trialed: Nectar thick liquids;Puree  Method of Presentation: Spoon;Single sips  Patient Positioned:  Upright;Midline (midline)    Oral Phase of Swallow: Within Functional Limits       Pharyngeal Phase of Swallow: Appears Impaired  Laryngeal Elevation Timing: Appears intact  Laryngeal Elevation Strength: Appears impaired  Laryngeal Elevation Coordination: Appears impaired  (Please note: Silent aspiration cannot be evaluated clinically. Videofluoroscopic Swallow Study is required to rule-out silent aspiration.)    Esophageal Phase of Swallow: No complaints consistent with possible esophageal involvement  Comments:             FOLLOW UP  Treatment Plan/Recommendations:  (Continue with home health SLP)     Follow Up Needed (Documentation Required): No       Thank you for your referral.   If you have any questions, please contact ARTEMIO Stanley

## 2024-12-07 NOTE — PLAN OF CARE
Problem: Patient Centered Care  Goal: Patient preferences are identified and integrated in the patient's plan of care  Description: Interventions:  - What would you like us to know as we care for you? I am from home with my wife  - Provide timely, complete, and accurate information to patient/family  - Incorporate patient and family knowledge, values, beliefs, and cultural backgrounds into the planning and delivery of care  - Encourage patient/family to participate in care and decision-making at the level they choose  - Honor patient and family perspectives and choices  Outcome: Progressing     Problem: Diabetes/Glucose Control  Goal: Glucose maintained within prescribed range  Description: INTERVENTIONS:  - Monitor Blood Glucose as ordered  - Assess for signs and symptoms of hyperglycemia and hypoglycemia  - Administer ordered medications to maintain glucose within target range  - Assess barriers to adequate nutritional intake and initiate nutrition consult as needed  - Instruct patient on self management of diabetes  Outcome: Progressing     Problem: Patient/Family Goals  Goal: Patient/Family Long Term Goal  Description: Patient's Long Term Goal: Discharge    Interventions:  - Monitor vitals  - Monitor appropriate labs  - Administer medications as ordered  - Follow MD's orders  - Update patient on plan of care   - Discharge planning     - See additional Care Plan goals for specific interventions  Outcome: Progressing  Goal: Patient/Family Short Term Goal  Description: Patient's Short Term Goal:     Interventions:   -   - See additional Care Plan goals for specific interventions  Outcome: Progressing     Problem: MUSCULOSKELETAL - ADULT  Goal: Return mobility to safest level of function  Description: INTERVENTIONS:  - Assess patient stability and activity tolerance for standing, transferring and ambulating w/ or w/o assistive devices  - Assist with transfers and ambulation using safe patient handling equipment as  needed  - Ensure adequate protection for wounds/incisions during mobilization  - Obtain PT/OT consults as needed  - Advance activity as appropriate  - Communicate ordered activity level and limitations with patient/family  Outcome: Progressing     Problem: PAIN - ADULT  Goal: Verbalizes/displays adequate comfort level or patient's stated pain goal  Description: INTERVENTIONS:  - Encourage pt to monitor pain and request assistance  - Assess pain using appropriate pain scale  - Administer analgesics based on type and severity of pain and evaluate response  - Implement non-pharmacological measures as appropriate and evaluate response  - Consider cultural and social influences on pain and pain management  - Manage/alleviate anxiety  - Utilize distraction and/or relaxation techniques  - Monitor for opioid side effects  - Notify MD/LIP if interventions unsuccessful or patient reports new pain  - Anticipate increased pain with activity and pre-medicate as appropriate  Outcome: Progressing     Problem: RISK FOR INFECTION - ADULT  Goal: Absence of fever/infection during anticipated neutropenic period  Description: INTERVENTIONS  - Monitor WBC  - Administer growth factors as ordered  - Implement neutropenic guidelines  Outcome: Progressing     Problem: SAFETY ADULT - FALL  Goal: Free from fall injury  Description: INTERVENTIONS:  - Assess pt frequently for physical needs  - Identify cognitive and physical deficits and behaviors that affect risk of falls.  - Prentice fall precautions as indicated by assessment.  - Educate pt/family on patient safety including physical limitations  - Instruct pt to call for assistance with activity based on assessment  - Modify environment to reduce risk of injury  - Provide assistive devices as appropriate  - Consider OT/PT consult to assist with strengthening/mobility  - Encourage toileting schedule  Outcome: Progressing     Problem: DISCHARGE PLANNING  Goal: Discharge to home or other  facility with appropriate resources  Description: INTERVENTIONS:  - Identify barriers to discharge w/pt and caregiver  - Include patient/family/discharge partner in discharge planning  - Arrange for needed discharge resources and transportation as appropriate  - Identify discharge learning needs (meds, wound care, etc)  - Arrange for interpreters to assist at discharge as needed  - Consider post-discharge preferences of patient/family/discharge partner  - Complete POLST form as appropriate  - Assess patient's ability to be responsible for managing their own health  - Refer to Case Management Department for coordinating discharge planning if the patient needs post-hospital services based on physician/LIP order or complex needs related to functional status, cognitive ability or social support system  Outcome: Progressing

## 2024-12-07 NOTE — DIETARY NOTE
ADULT NUTRITION INITIAL ASSESSMENT    Pt is at high nutrition risk.  Pt does not meet malnutrition criteria.      RECOMMENDATIONS TO MD: See Nutrition Intervention for enteral nutrition specifics     ADMITTING DIAGNOSIS:  ESRD on hemodialysis (HCC) [N18.6, Z99.2]  Peripheral polyneuropathy [G62.9]  PERTINENT PAST MEDICAL HISTORY:   Past Medical History:    Anemia    Asthma (HCC)    Back problem    BPH (benign prostatic hyperplasia)    Calculus of kidney    Cataract    Coronary atherosclerosis    Diabetes (HCC)    ESRD (end stage renal disease) on dialysis (HCC)    Essential hypertension    High blood pressure    High cholesterol    History of blood transfusion    Hyperlipidemia    Neuropathy    hands and feet    KRAIG on CPAP    Renal disorder    Sleep apnea    Visual impairment    glasses    Vocal cord paralysis, unilateral partial     PATIENT STATUS: Initial 12/07/24: Pt admit for ESRD on HD and peripheral polyneuropathy. PMH sig for ESRD on HD (MWF), Diabetes (A1c 6.8% on 12/3/24), G-tube (placed 10/19/24) and others noted above. Pt assessed due to screening at risk for pressure injury (PI) and chronic g-tube. RD also received consult to order and manage tube feedings. Pt known to nutrition services from previous admissions, last seen 10/21/24. Chart reviewed, pt admitted with c/o bilateral foot pain - recently discharged from rehab for hip fracture. PEG tube placed recently s/p aspiration pneumonia and inability to swallow. Discussion with RN, no concerns. Pt visited, spouse at bedside assisting in home TF regimen and weight history. Spouse reports using bolus feedings as continuous feedings over 18 hours was too difficult due to in and out of various doctor appointments therefore was told to switch to bolus feedings - timeframe unknown. Spouse reports doing 1 carton 3x/day (3 cartons total) - spouse reports trying for 3.5 cartons daily but consistently 3 cartons daily. Spouse reports usual body weight (UBW) prior  to getting sick 155-157# - last known around September 2024. Current weight 148# 9.6 oz. EMR weight review, last known weight # on 11/30/24. Per EMR weight review, pt noted weighing 143# 12 oz on 10/2/24 - stable, 155# 11 oz on 9/28/24 - 7.1# or 4.6% weight loss x 3 months (non-significant),  157# 11 oz on 6/1/24 - 9.1# or 5.8% weight loss x 6 months (non-significant) and 166# on 11/30/23 - 17.4# or 10.5% weight loss x 13 months (non-significant). Nutrition focused physical exam (NFPE) completed, see below for details. See nutrition intervention for TF recommendations, start with continuous feedings and transition to bolus feedings prior to discharge.     FOOD/NUTRITION RELATED HISTORY:  Intake:  Per spouse: Nepro bolus feedings: 1 carton 3x/day (total of 3 cartons total). Provides 1260 calories, 57 grams protein and 516 ml free water.  FWF 1 syringe before and after each feeding (unsure exact amount of syringe)  Spouse reports trying to get 3.5 cartons in daily.   Intake Meeting Needs: TF will meet needs once at goal rate  Percent Meals Eaten (last 3 days)       Date/Time Percent Meals Eaten (%)    12/06/24 1533 0 %    12/07/24 0804 0 %     Percent Meals Eaten (%): npo at 12/07/24 0804           Food Allergies: No Known Food Allergies (NKFA)  Cultural/Ethnic/Hindu Preferences: Not Obtained    GASTROINTESTINAL: +BM 12/6/24 - medium;loose, diarrhea, and PEG/G-tube    MEDICATIONS: reviewed IVF noted  Lansoprazole q morning (Hold TF 1 hour before and after administration per protocol)   amiodarone  200 mg Per G Tube Daily    aspirin  81 mg Oral Daily    atorvastatin  40 mg Per G Tube Nightly    carvedilol  25 mg Per G Tube BID    clopidogrel  75 mg Oral Daily    fluticasone propionate  2 spray Nasal Daily    gabapentin  300 mg Per G Tube BID    insulin degludec  5 Units Subcutaneous Nightly    lansoprazole  30 mg Per G Tube QAM AC    heparin  5,000 Units Subcutaneous Q8H STEPHANIE    insulin aspart  1-7 Units  Subcutaneous TID CC and HS      sodium chloride 75 mL/hr at 12/07/24 1058     LABS: reviewed A1c 6.8% on 12/3/24  POC Glucose reviewed  Hyponatremia (133) noted  Recent Labs     12/06/24  1359 12/07/24  0547   * 129*   BUN 37* 37*   CREATSERUM 4.04* 4.43*   CA 9.5 9.6   MG 2.3 2.4   * 133*   K 4.0 3.9   CL 92* 95*   CO2 35.0* 32.0   OSMOCALC 290 286     NUTRITION RELATED PHYSICAL FINDINGS:  - Nutrition Focused Physical Exam (NFPE): mild depletion body fat triceps region  - Fluid Accumulation: none  see RN documentation for details  - Skin Integrity: at risk and Stage 2 posterior scrotum and DTI left heel noted per flowsheet documentation  --> see RN documentation for details    ANTHROPOMETRICS:  HT: 162.6 cm (5' 4\")  WT: 67.4 kg (148 lb 9.6 oz)   BMI: Body mass index is 25.51 kg/m².  BMI CLASSIFICATION: 25-29.9 kg/m2 - overweight  IBW: 130 lbs        114% IBW  Usual Body Wt: 155-157 lbs per spouse      95-96% UBW    WEIGHT HISTORY:  Patient Weight(s) for the past 336 hrs:   Weight   12/06/24 1805 67.4 kg (148 lb 9.6 oz)   12/06/24 1702 67.4 kg (148 lb 9.6 oz)     Wt Readings from Last 10 Encounters:   12/06/24 67.4 kg (148 lb 9.6 oz)   11/30/24 65.8 kg (145 lb)   10/22/24 60.3 kg (132 lb 14.4 oz)   08/22/24 72.5 kg (159 lb 12.8 oz)   08/20/24 71.7 kg (158 lb)   08/13/24 65.6 kg (144 lb 11.2 oz)   08/06/24 70.8 kg (156 lb)   08/01/24 66.5 kg (146 lb 8 oz)   06/27/24 73.9 kg (163 lb)   06/13/24 71 kg (156 lb 8 oz)     NUTRITION DIAGNOSIS/PROBLEM:   Inadequate enteral nutrition infusion related to Infusion volume not reached or schedule for infusion interrupted as evidenced by TF currently not running    Increased nutrient needs related to increased demand of nutrient as evidenced by pressure injuries per flowsheet    NUTRITION INTERVENTION:   NUTRITION PRESCRIPTION:   Estimated Nutrition needs: --dosing wt of 67.4 kg - wt taken on 12/6/24  Calories: 0966-4702 calories/day (25-30 calories per kg Dosing  wt)  Protein: 67-81 g protein/day (1.0-1.2 g protein/kg Dosing wt)  Fluid Needs: 1348 ml (20ml/kg dosing wt). Adjust per clinical status (ESRD)    - Diet:       Procedures    NPO     - Enteral Nutrition: Nepro at 30 ml/hr per G-Tube/PEG. Recommend advance rate 10 ml q 4 hours to goal rate of 50 ml/hr. Based on average 21 hour infusion time (hold for lansoprazole per protocol). Goal rate provides 1890 kcal, 85 grams protein, 763 ml total free water, and 100% RDI's, 100% calorie needs and 100% protein needs.  Water flushes 30 ml q 4 hours (180 ml). Total water: 943 ml + IVF.    **Home Bolus Feeding Recommendation: Nepro 4.5 cartons daily (1 @ 0800, 1.5 @ 1200, 1 @ 1600, 1 @ 2000). FWF 50 ml before and after each feeding. Provides: 1890 calories, 86 grams protein, 774 ml free water. Total water: 1174 ml total water.    - RD Malnutrition Care Plan:  Resume TF via G-tube  - Vitamin and mineral supplements: none  - Nutrition education: assess education needs     - Coordination of nutrition care: collaboration with other providers  - Discharge and transfer of nutrition care to new setting or provider: monitor plans    MONITOR AND EVALUATE/NUTRITION GOALS:  - Food and Nutrient Intake:      Monitor: N/A  - Food and Nutrient Administration:      Monitor: tolerance to enteral nutrition, adequacy of enteral nutrition, resumption of enteral nutrition, and for enteral nutrition adjustment  - Anthropometric Measurement:    Monitor weight  - Nutrition Goals:      halt wt loss, tube feed meets greater than 80% of needs, labs within acceptable limits, euglycemia, minimize lean body mass loss, support wound healing, and improved GI status    DIETITIAN FOLLOW UP: RD to follow and monitor nutrition status    Yana Iqbal MS, LESLY, RDN, LDN  [phone]

## 2024-12-07 NOTE — OCCUPATIONAL THERAPY NOTE
OCCUPATIONAL THERAPY EVALUATION - INPATIENT     Room Number: 553/553-A  Evaluation Date: 12/7/2024  Type of Evaluation: Initial  Presenting Problem: peripheral polyneuropathy    Physician Order: IP Consult to Occupational Therapy  Reason for Therapy: ADL/IADL Dysfunction and Discharge Planning    OCCUPATIONAL THERAPY ASSESSMENT   Patient is a 79 year old male admitted 12/6/2024 for peripheral polyneuropathy.  Prior to admission, patient's baseline is requiring some assist with ADLs and functional mobility.  Patient is currently functioning below baseline with toileting, bathing, upper body dressing, lower body dressing, grooming, eating, bed mobility, transfers, static sitting balance, dynamic sitting balance, static standing balance, dynamic standing balance, maintaining seated position, functional standing tolerance, and energy conservation strategies.  Patient is requiring moderate assist and maximum assist as a result of the following impairments: decreased functional strength, decreased endurance, impaired   balance, impaired coordination, impaired motor planning, decreased muscular endurance, and medical status. Occupational Therapy will continue to follow for duration of hospitalization.    Patient will benefit from continued skilled OT Services to promote return to prior level of function and safety with continuous assistance and gradual rehabilitative therapy.    PLAN DURING HOSPITALIZATION  OT Device Recommendations: TBD  OT Treatment Plan: Balance activities;Energy conservation/work simplification techniques;IADL training;ADL training;Functional transfer training;UE strengthening/ROM;Endurance training;Patient/Family education;Patient/Family training;Equipment eval/education;Neuromuscluar reeducation     OCCUPATIONAL THERAPY MEDICAL/SOCIAL HISTORY   Problem List  Principal Problem:    Peripheral polyneuropathy  Active Problems:    ESRD on hemodialysis (HCC)    HOME SITUATION  Type of Home: House  Lives  With: Spouse  Hand Dominance: Right  Drives: No  Patient Regularly Uses: None    Stairs in Home: none  Use of Assistive Device(s): RW    Prior Level of [place]: Prior to admission, patient was requiring assist with all ADLs and IADLs. Patient lives in a house with wife. Patient was not driving and currently does not work. Patient used RW and w/c for mobility.     SUBJECTIVE  \"I would like to sit up.\"    OCCUPATIONAL THERAPY EXAMINATION      OBJECTIVE  Precautions: Bed/chair alarm  Fall Risk: High fall risk      PAIN ASSESSMENT  Ratin  Location: BLEs  Management Techniques: Activity promotion; Body mechanics; Relaxation; Repositioning    COGNITION  Overall Cognitive Status:  WFL - within functional limits    VISION  Current Vision: no visual deficits    PERCEPTION  Overall Perception Status:   WFL - within functional limits    SENSATION  Light touch:  bilateral LE neuropathy    Communication: Able to communicate wants and needs     Behavioral/Emotional/Social: Calm and cooperative     RANGE OF MOTION   Upper extremity ROM is within functional limits     STRENGTH ASSESSMENT  Upper extremity strength is within functional limits     COORDINATION  Gross Motor: WFL  Fine Motor: WFL     ACTIVITIES OF DAILY LIVING ASSESSMENT  AM-PAC ‘6-Clicks’ Inpatient Daily Activity Short Form  How much help from another person does the patient currently need…  -   Putting on and taking off regular lower body clothing?: A Lot  -   Bathing (including washing, rinsing, drying)?: A Lot  -   Toileting, which includes using toilet, bedpan or urinal? : A Lot  -   Putting on and taking off regular upper body clothing?: A Little  -   Taking care of personal grooming such as brushing teeth?: A Little  -   Eating meals?: A Little    AM-PAC Score:  Score: 15  Approx Degree of Impairment: 56.46%  Standardized Score (AM-PAC Scale): 34.69  CMS Modifier (G-Code): CK    FUNCTIONAL TRANSFER ASSESSMENT  Sit to Stand: Edge of Bed  Edge of Bed:  Maximum Assist    BED MOBILITY  Supine to Sit : Moderate Assist    FUNCTIONAL ADL ASSESSMENT  Eating: Minimal Assist  Grooming Seated: Minimal Assist  Bathing Seated: Maximum Assist  UB Dressing Seated: Minimal Assist  LB Dressing Seated: Maximum Assist  Toileting Seated: Maximum Assist    THERAPEUTIC EXERCISE     Skilled Therapy Provided: Patient received supine in bed. Patient performing ADLs and functional mobility at a mod-max A level this session. Max A x1 with second assist standing by for safety. Patient presents with posterior lean when sitting EOB and fatigues quickly. Patient most limited by overall strength and endurance. Education provided on body mechanics and how those manifest functionally while completing ADLs and functional mobility. Patient with good return demonstration on all education.     EDUCATION PROVIDED  Patient Education : Role of Occupational Therapy; Plan of Care; Discharge Recommendations; DME Recommendations; Functional Transfer Techniques; Fall Prevention; Surgical Precautions; Posture/Positioning; Energy Conservation; Proper Body Mechanics  Patient's Response to Education: Verbalized Understanding; Returned Demonstration    The patient's Approx Degree of Impairment: 56.46% has been calculated based on documentation in the [facility] '6 clicks' Inpatient Daily Activity Short Form.  Research supports that patients with this level of impairment may benefit from subacute rehab.  Final disposition will be made by interdisciplinary medical team.     Patient End of Session: Up in chair;Call light within reach;Needs met;RN aware of session/findings;All patient questions and concerns addressed;Alarm set;Family present;With  staff    OT Goals  Patients self stated goal is: unstated     Patient will complete functional transfer with min A  Comment:     Patient will complete toileting with min A  Comment:     Patient will tolerate standing for 3 minutes in prep for adls with min A   Comment:     Patient will complete item retrieval with min A  Comment:          Goals  on: 24  Frequency: 3-5x/week    Patient Evaluation Complexity Level:   Occupational Profile/Medical History MODERATE - Expanded review of history including review of medical or therapy record   Specific performance deficits impacting engagement in ADL/IADL MODERATE  3 - 5 performance deficits   Client Assessment/Performance Deficits MODERATE - Comorbidities and min to mod modifications of tasks    Clinical Decision Making MODERATE - Analysis of occupational profile, detailed assessments, several treatment options    Overall Complexity MODERATE     Self-Care Home Management: 15 minutes  Therapeutic Activity: 10 minutes    Starla Portillo OTR/L  [facility]  [phone]

## 2024-12-07 NOTE — CONSULTS
[facility]  part of [facility]    Report of Consultation    Ollie Hernández Patient Status:  Inpatient    1945 MRN [id number]   Location [facility] 5SW/SE Attending Dee Joyce,*   Hosp Day # 1 PCP Wili Parmar MD     Date of Admission:  2024  Date of Consult:  2024  Reason for Consultation:   Patient needs dialysis is ESRD patient    History of Present Illness:   Patient is a 79 year old male who was admitted to the hospital for Peripheral polyneuropathy:  Has had burning pain in his feet despite gabapentin and Norco at home for the last month or so history of diabetes multiple GI bleeds due to angiodysplasia coronary artery disease hyperlipidemia sleep apnea recent hip fracture is in the hospital very often now comes in with intractable pain in his lower EXTR extremities especially his feet breathing fine no problems with dialysis last dialysis was on Wednesday    Past Medical History  Past Medical History:    Anemia    Asthma (HCC)    Back problem    BPH (benign prostatic hyperplasia)    Calculus of kidney    Cataract    Coronary atherosclerosis    Diabetes (HCC)    ESRD (end stage renal disease) on dialysis (HCC)    Essential hypertension    High blood pressure    High cholesterol    History of blood transfusion    Hyperlipidemia    Neuropathy    hands and feet    KRAIG on CPAP    Renal disorder    Sleep apnea    Visual impairment    glasses    Vocal cord paralysis, unilateral partial       Past Surgical History  Past Surgical History:   Procedure Laterality Date    Appendectomy          Back surgery      Neck/back -     Capsule endoscopy - internal referral      Cataract extraction Right 2022    PHACOEMULSIFICATION WITH POSTERIOR CHAMBER INTRAOCULAR LENS, RIGHT EYE    Cataract extraction Left 2021    PHACOEMULSIFICATION WITH POSTERIOR CHAMBER INTRAOCULAR LENS, LEFT EYE    Cath bare metal stent (bms)      Cath drug eluting stent   2024    Successful IVUS guided PCI of the circumflex with a ABIMBOLA    Colonoscopy      Colonoscopy N/A 2021    Procedure: COLONOSCOPY;  Surgeon: Michael Gautam MD;  Location: [facility] ENDO    Colonoscopy N/A 2024    Procedure: COLONOSCOPY;  Surgeon: Gabriel Saldana MD;  Location: [facility] ENDOSCOPY    Colonoscopy N/A 06/15/2024    Procedure: COLONOSCOPY;  Surgeon: Carlyn Storey MD;  Location: [facility] ENDOSCOPY    Colonoscopy N/A 2024    Procedure: COLONOSCOPY;  Surgeon: Gabriel Saldana MD;  Location: [facility] ENDOSCOPY    Dialysis procedure  2023    right internal jugular tunneled dialysis catheter placement.    Hand/finger surgery unlisted      Accidental trauma    Spine surgery procedure unlisted      Total hip arthroplasty Left 10/04/2024    Left anterior total hip arthroplasty    Upper gi endoscopy,diagnosis         Family History  Family History   Problem Relation Age of Onset    Cancer Father         Kidney    Breast Cancer Mother     Diabetes Mother     Diabetes Maternal Grandmother     Diabetes Maternal Grandfather     Heart Disorder Other         Uncle       Social History  Social History     Socioeconomic History    Marital status:      Spouse name: Not on file    Number of children: Not on file    Years of education: Not on file    Highest education level: Not on file   Occupational History    Not on file   Tobacco Use    Smoking status: Former     Current packs/day: 0.00     Average packs/day: 1 pack/day for 17.0 years (17.0 ttl pk-yrs)     Types: Cigarettes     Start date: 1964     Quit date: 1981     Years since quittin.9    Smokeless tobacco: Never   Vaping Use    Vaping status: Never Used   Substance and Sexual Activity    Alcohol use: No     Alcohol/week: 0.0 standard drinks of alcohol    Drug use: No    Sexual activity: Yes     Partners: Female   Other Topics Concern    Not on file   Social History Narrative    Not on file     Social Drivers of Health     Financial  Resource Strain: Low Risk  (9/11/2024)    Received from [facility]    Financial Resource Strain     In the past year, have you or any family members you live with been unable to get any of the following when it was really needed? Check all that apply.: None   Food Insecurity: No Food Insecurity (12/6/2024)    Food Insecurity     Food Insecurity: Never true   Transportation Needs: No Transportation Needs (12/6/2024)    Transportation Needs     Lack of Transportation: No     Car Seat: Not on file   Recent Concern: Transportation Needs - At Risk (9/11/2024)    Received from [facility]    Transportation Needs     In the past 12 months, has lack of reliable transportation kept you from medical appointments, meetings, work or from getting things needed for daily living? : Yes   Physical Activity: Not on file   Stress: Not on file   Social Connections: Low Risk  (9/11/2024)    Received from [facility]    Social Connections     How often do you see or talk to people that you care about and feel close to? (For example: talking to friends on the phone, visiting friends or family, going to [employer]ian[employer] or club meetings): 5 or more times a week   Housing Stability: Low Risk  (12/6/2024)    Housing Stability     Housing Instability: No     Housing Instability Emergency: Not on file     Crib or [place]in[place]: Not on file          Current Medications:  Current Facility-Administered Medications   Medication Dose Route Frequency    dextrose 10% infusion (TPN no rate)   Intravenous Continuous PRN    pancrelipase (Lip-Prot-Amyl) (Zenpep) DR particles cap 10,000 Units  10,000 Units Per G Tube PRN    And    sodium bicarbonate tab 325 mg  325 mg Oral PRN    lidocaine-menthol 4-1 % patch 1 patch  1 patch Transdermal Daily    amitriptyline (Elavil) tab 10 mg  10 mg Per G Tube Nightly    metoprolol (Lopressor) 5 mg/5mL injection 5 mg  5 mg Intravenous PRN    Or    metoprolol (Lopressor) 5 mg/5mL injection 2.5 mg   2.5 mg Intravenous PRN    heparin (Porcine) 1000 UNIT/ML injection 1,500 Units  1.5 mL Intravenous PRN Dialysis    lidocaine-prilocaine (Emla) 2.5-2.5 % cream   Topical PRN    sodium chloride 0.9 % IV bolus 100 mL  100 mL Intravenous Q30 Min PRN    And    albumin human (Albumin) 25% injection 25 g  25 g Intravenous PRN Dialysis    amiodarone (Pacerone) tab 200 mg  200 mg Per G Tube Daily    aspirin DR tab 81 mg  81 mg Oral Daily    atorvastatin (Lipitor) tab 40 mg  40 mg Per G Tube Nightly    carvedilol (Coreg) tab 25 mg  25 mg Per G Tube BID    clopidogrel (Plavix) tab 75 mg  75 mg Oral Daily    fluticasone propionate (Flonase) 50 MCG/ACT nasal suspension 2 spray  2 spray Nasal Daily    gabapentin (Neurontin) 250 MG/5ML oral solution 300 mg  300 mg Per G Tube BID    insulin degludec (Tresiba) 100 units/mL flextouch 5 Units  5 Units Subcutaneous Nightly    lansoprazole (Prevacid Solutab) disintegrating tab 30 mg  30 mg Per G Tube QAM AC    glucose (Dex4) 15 GM/59ML oral liquid 15 g  15 g Oral Q15 Min PRN    Or    glucose (Glutose) 40% oral gel 15 g  15 g Oral Q15 Min PRN    Or    glucose-vitamin C (Dex-4) chewable tab 4 tablet  4 tablet Oral Q15 Min PRN    Or    dextrose 50% injection 50 mL  50 mL Intravenous Q15 Min PRN    Or    glucose (Dex4) 15 GM/59ML oral liquid 30 g  30 g Oral Q15 Min PRN    Or    glucose (Glutose) 40% oral gel 30 g  30 g Oral Q15 Min PRN    Or    glucose-vitamin C (Dex-4) chewable tab 8 tablet  8 tablet Oral Q15 Min PRN    heparin (Porcine) 5000 UNIT/ML injection 5,000 Units  5,000 Units Subcutaneous Q8H STEPHANIE    acetaminophen (Tylenol Extra Strength) tab 500 mg  500 mg Oral Q4H PRN    acetaminophen (Tylenol) tab 650 mg  650 mg Oral Q4H PRN    Or    HYDROcodone-acetaminophen (Norco) 5-325 MG per tab 1 tablet  1 tablet Oral Q4H PRN    Or    HYDROcodone-acetaminophen (Norco) 5-325 MG per tab 2 tablet  2 tablet Oral Q4H PRN    polyethylene glycol (PEG 3350) (Miralax) 17 g oral packet 17 g  17 g  Oral Daily PRN    sennosides (Senokot) tab 17.2 mg  17.2 mg Oral Nightly PRN    bisacodyl (Dulcolax) 10 MG rectal suppository 10 mg  10 mg Rectal Daily PRN    insulin aspart (NovoLOG) 100 Units/mL FlexPen 1-7 Units  1-7 Units Subcutaneous TID CC and HS     Medications Prior to Admission   Medication Sig    acetaminophen 500 MG Oral Tab 1 tablet (500 mg total) by Per G Tube route in the morning and 1 tablet (500 mg total) before bedtime.    Gabapentin 300 MG/6ML Oral Solution 300 mg by Peg Tube route in the morning and 300 mg before bedtime.    linaGLIPtin (TRADJENTA) 5 mg Oral Tab 1 tablet (5 mg total) by Per G Tube route daily.    atorvastatin 40 MG Oral Tab 1 tablet (40 mg total) by Per G Tube route nightly.    amiodarone 200 MG Oral Tab 1 tablet (200 mg total) by Per G Tube route daily.    carvedilol 25 MG Oral Tab 1 tablet (25 mg total) by Per G Tube route 2 (two) times daily.    B Complex-C-Folic Acid (RENAL MULTIVITAMIN FORMULA) Oral Tab 1 tablet by Per G Tube route daily.    clopidogrel 75 MG Oral Tab Take 1 tablet (75 mg total) by mouth daily.    albuterol (PROAIR HFA) 108 (90 Base) MCG/ACT Inhalation Aero Soln Inhale 2 puffs into the lungs every 4 (four) hours as needed for Wheezing.    fluticasone propionate 50 MCG/ACT Nasal Suspension 2 sprays by Nasal route daily.    amoxicillin clavulanate 250-125 MG Oral Tab 1 tablet (250 mg total) by Per G Tube route daily. Taking for swelling to left side of jaw    lansoprazole 30 MG Oral Tablet Delayed Release Dispersible 1 tablet (30 mg total) by Per G Tube route every morning before breakfast.    aspirin 81 MG Oral Tab EC Take 1 tablet (81 mg total) by mouth daily. (Patient taking differently: Take 1 tablet (81 mg total) by mouth daily. Per G-tube)    cetirizine 10 MG Oral Tab Take 1 tablet (10 mg total) by mouth every other day.    polyethylene glycol, PEG 3350, 17 g Oral Powd Pack 17 g by Per G Tube route daily as needed (Constipation).    Insulin Lispro, 1 Unit  Dial, (HUMALOG KWIKPEN) 100 UNIT/ML Subcutaneous Solution Pen-injector Take subcutaneously 4 times daily before tube feedings per sliding scale. Max total dose of 20 units. (Patient not taking: Reported on 12/6/2024)    Insulin Pen Needle 33G X 4 MM Does not apply Misc 1 each 4 (four) times daily.    albuterol (2.5 MG/3ML) 0.083% Inhalation Nebu Soln Take 3 mL (2.5 mg total) by nebulization every 4 (four) hours as needed for Wheezing or Shortness of Breath. (Patient not taking: Reported on 12/6/2024)    HYDROcodone-acetaminophen 5-325 MG Oral Tab 1 tablet by Per G Tube route every 8 (eight) hours as needed. (Patient not taking: Reported on 12/6/2024)    Incontinence Supply Disposable (COMFORT PROTECT ADULT DIAPER/L) Does not apply Misc 1 Application daily.    Electronic Thermometer ([employer] DIGITAL THERMOMETER TEMPLE) Does not apply Misc Check temperature    insulin glargine (LANTUS SOLOSTAR) 100 UNIT/ML Subcutaneous Solution Pen-injector Inject 8 Units into the skin every morning. (Patient not taking: Reported on 12/6/2024)    Insulin Pen Needle 32G X 4 MM Does not apply Misc Take as directed    Budesonide-Formoterol Fumarate (SYMBICORT) 160-4.5 MCG/ACT Inhalation Aerosol Inhale 2 puffs into the lungs 2 (two) times daily. (Patient not taking: Reported on 12/6/2024)    Starch-Maltodextrin (THICK-IT) Oral Powd Pack Use as directed (Patient not taking: Reported on 12/6/2024)    albuterol (2.5 MG/3ML) 0.083% Inhalation Nebu Soln Take 3 mL (2.5 mg total) by nebulization in the morning and 3 mL (2.5 mg total) before bedtime. (Patient not taking: Reported on 12/3/2024)    lidocaine 4 % External Patch Place 1 patch onto the skin daily. (Patient not taking: Reported on 12/3/2024)    acetaminophen 500 MG Oral Tab Take 1 tablet (500 mg total) by mouth every 6 (six) hours as needed for Pain. (Patient not taking: Reported on 12/6/2024)    Cholecalciferol 125 MCG (5000 UT) Oral Tab Take 1 tablet (5,000 Units total) by mouth 2 (two)  times daily. (Patient not taking: Reported on 12/3/2024)       Allergies  Allergies[1]      Review of Systems:   Constitutional: negative for fatigue, fevers and weight loss  Eyes: negative for irritation, redness and visual disturbance  Ears, nose, mouth, throat, and face: negative for hearing loss and sore throat  Respiratory: negative for cough, hemoptysis and wheezing  Cardiovascular: negative for chest pain, exertional dyspnea, lower extremity edema and palpitations  Gastrointestinal: negative for abdominal pain, diarrhea and nausea  Genitourinary:negative for dysuria, frequency and hematuria  Hematologic/lymphatic: negative for bleeding and easy bruising  Musculoskeletal:negative for back pain, bone pain and muscle weakness  Neurological: Burning pain bilaterally both feet  Behavioral/Psych: negative for anxiety and depression  Endocrine: negative for polyuria and weight loss     Physical Exam:   Blood pressure 127/61, pulse 67, temperature 99.1 °F (37.3 °C), temperature source Axillary, resp. rate 18, height 5' 4\" (1.626 m), weight 148 lb 9.6 oz (67.4 kg), SpO2 94%.    Intake/Output Summary (Last 24 hours) at 12/7/2024 1659  Last data filed at 12/7/2024 1625  Gross per 24 hour   Intake 816.25 ml   Output 0 ml   Net 816.25 ml     Wt Readings from Last 3 Encounters:   12/06/24 148 lb 9.6 oz (67.4 kg)   11/30/24 145 lb (65.8 kg)   10/22/24 132 lb 14.4 oz (60.3 kg)       General appearance: alert, appears stated age and cooperative does appear chronically ill  Head: Normocephalic, atraumatic  Eyes: conjunctivae/corneas clear  Throat: lips, mucosa, and tongue normal; teeth and gums normal  Neck:  no JVD, supple, symmetrical  Pulmonary:  clear to auscultation bilaterally  Cardiovascular: S1, S2 normal, no rub or gallop, regular rate and rhythm  Abdominal: soft, non-tender; non distended, bowel sounds normal   Extremities: extremities normal, no edema  Pulses: pedal pulses palpable  Skin: No rashes or lesions  Lymph  nodes: Cervical, supraclavicular normal.  Neurologic: Grossly normal  Psychiatric: calm  Right AV fistula good thrill and bruit  Results:     Laboratory Data:  Lab Results   Component Value Date    WBC 11.1 (H) 12/07/2024    HGB 8.2 (L) 12/07/2024    HCT 25.3 (L) 12/07/2024    .0 12/07/2024    CREATSERUM 4.43 (H) 12/07/2024    BUN 37 (H) 12/07/2024     (L) 12/07/2024    K 3.9 12/07/2024    CL 95 (L) 12/07/2024    CO2 32.0 12/07/2024     (H) 12/07/2024    CA 9.6 12/07/2024    ALB 3.3 12/07/2024    ALKPHO 116 12/07/2024    BILT 0.3 12/07/2024    TP 6.7 12/07/2024    AST 32 12/07/2024    ALT 29 12/07/2024    PTT 30.1 08/09/2024    INR 1.45 (H) 08/09/2024    T4F 0.9 08/31/2022    TSH 2.844 07/04/2024     (H) 07/30/2024    PSA 2.94 10/20/2021    DDIMER 6.07 (H) 09/29/2024    ESRML 10 06/16/2024    CRP 1.30 (H) 06/16/2024    MG 2.4 12/07/2024    PHOS 3.0 10/22/2024    TROP <0.045 07/25/2019     08/05/2023    B12 672 07/04/2024       Imaging:  @EETHE BEARDED [email or web address]@   XR CHEST AP PORTABLE  (CPT=71045)    Result Date: 12/6/2024  CONCLUSION:  1. Interval worsening in the chest with mild cardiomegaly and mild to moderate pulmonary congestive change interstitial edema suggesting cardiac failure/fluid overload.  Questionable small right pleural effusion.  I cannot definitely exclude underlying pneumonia/inflammatory process.  Correlate clinically and follow-up studies are advised.    Dictated by (CST): Ramón Schmidt MD on 12/06/2024 at 2:15 PM     Finalized by (CST): Ramón Schmidt MD on 12/06/2024 at 2:16 PM                Impression:     Patient Active Problem List   Diagnosis    Mixed hyperlipidemia    Pulmonary HTN (HCC)    KRAIG (obstructive sleep apnea)    Gout    Type 2 diabetes mellitus with chronic kidney disease on chronic dialysis, with long-term current use of insulin (HCC)    Anemia of chronic renal failure    Chronic diastolic congestive heart failure (HCC)    Vitamin D deficiency     Chronic obstructive asthma (HCC)    Secondary hyperparathyroidism (HCC)    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)    Atherosclerosis of native arteries of extremities with intermittent claudication, bilateral legs (HCC)    Primary hypertension    Smokers' cough (HCC)    Unstable angina (HCC)    Lower GI bleed    ESRD (end stage renal disease) on dialysis (HCC)    PAF (paroxysmal atrial fibrillation) (HCC)    AVM (arteriovenous malformation) of colon    ABLA (acute blood loss anemia)    Rectal bleeding    Anticoagulated    Antiplatelet or antithrombotic long-term use    Lower GI bleeding    ESRD on hemodialysis (HCC)    GI bleed    Closed displaced midcervical fracture of left femur with delayed healing    ESRD (end stage renal disease) (HCC)    Closed fracture of left hip (HCC)    Hyperphosphatemia    Respiratory failure (HCC)    Anemia    Palliative care by specialist    Thrombocytopenia ([facility])    Peripheral polyneuropathy        Recommendations:  #1 severe neuropathy appreciate workup of Dr. Mari  Labs pending including thyroid B12 electrophoresis  I get nervous with gabapentin in dialysis patients as it can build up defer pain control to patient and physician    #2 ESRD dialysis today next again on Monday  #3 anemia Epogen ordered transfuse under hemoglobin of 7 today 8.2    #4 hypertension controlled  #5 coronary disease    Discussed with patient wife and daughter    Thank you for allowing me to participate in the care of your patient.    José Callahan MD  12/7/2024         [1]   Allergies  Allergen Reactions    Adhesive Tape OTHER (SEE COMMENTS)     Severe rashes    Dust Mite Extract RASH

## 2024-12-07 NOTE — RESPIRATORY THERAPY NOTE
The RN and physician notified that pt cannot take Advair due to inadequate inhalation. RT recommends nebulizer.

## 2024-12-07 NOTE — CM/SW NOTE
12/07/24 1300   CM/SW Referral Data   Referral Source Physician   Reason for Referral Discharge planning   Informant Patient;Spouse/Significant Other;EMR;Clinical Staff Member   Medical Hx   Does patient have an established PCP? Yes  (Dr. Parmar)   Significant Past Medical/Mental Health Hx ESRD; KRAIG   Patient Info   Advanced directives? No   Patient's Current Mental Status at Time of Assessment Alert;Oriented   Patient's Home Environment House   Number of Levels in Home 1   Number of Stair in Home 3 MIGUEL   Patient lives with Spouse/Significant other   Patient Status Prior to Admission   Independent with ADLs and Mobility No   Pt. requires assistance with Housework;Driving;Meals;Bathing;Ambulating;Dressing;Medications;Feeding;Toileting   Services in place prior to admission Home Health Care;DME/Supplies at home;Dialysis   Home Health Provider Info [facility]   DME Provider/Supplier HME/[facility]   Type of DME/Supplies Wheelchair;Ramp;CPAP;Tube Feeding Supplies   Dialysis Clinic [facility]  ([place])   Scheduled Dialysis days M-W-F   Dialysis scheduled time 0320   Discharge Needs   Anticipated D/C needs Home health care;Medical equipment;Enteral nutrition;Outpatient dialysis     NOEMY received MD order for home health evaluation.    Pt admitted for polyneuropathy- recent DC from [facility] after hip fx.    NOEMY met with pt and wife Stephy bedside to complete assessment.    NOEMY confirmed address and PCP on file.    Pt is from home w/Stephy in a 1-level home w/WC ramp.  Pt requires assistance with all ADLs and is WC bound.    Pt is strict NPO w/GJ tube.      [facility] 3:20 chair time.  Stephy stated he has been using medical transport through [place] but pt is running out of days.NOEMY suggested [facility] as the most cost-affective resource.      NOEMY verified pt is curr w/[facility] for RN/ST. RODRIGUEZ entered on request of  [facility] liaison Starr.    Pt curr w/[facility] for TF formula/supplies.    Stephy stated rehab set pt up with semi-electric hospital bed- Stephy stated she does not like it and cannot work the crank.  SW informed her she will need to swap the beds and without the supplier name will be delayed.  Stephy is not sure and could not locate company name.  She is aware there is an up charge for full electric.    DME referarls sent in [employer].    Stephy requesting suction machine- has dx of dysphasia does does qualify.      MD verbiage for suction machine:    Patient is unable to clear secretions due to dysphasia.    SW to enter MD order for co-sign.    PLAN: DC home w/[facility] and DME pending confirmation of DME company/bed switch/suction machine    / to remain available for support and/or discharge planning.      Gabrielle Otto MSW, LSW [id number]

## 2024-12-07 NOTE — PLAN OF CARE
Phone call made to [facility] at 5:21pm and spoke to Angelica to arrange for hemodialysis treatment for patient for today, 12/7/2024 per Dr. José Callahan orders.

## 2024-12-08 LAB
ALBUMIN SERPL-MCNC: 3 G/DL (ref 3.2–4.8)
ANION GAP SERPL CALC-SCNC: 7 MMOL/L (ref 0–18)
BASOPHILS # BLD AUTO: 0.06 X10(3) UL (ref 0–0.2)
BASOPHILS NFR BLD AUTO: 0.5 %
BUN BLD-MCNC: 54 MG/DL (ref 9–23)
BUN/CREAT SERPL: 10.2 (ref 10–20)
CALCIUM BLD-MCNC: 9.2 MG/DL (ref 8.7–10.4)
CHLORIDE SERPL-SCNC: 94 MMOL/L (ref 98–112)
CO2 SERPL-SCNC: 32 MMOL/L (ref 21–32)
CREAT BLD-MCNC: 5.29 MG/DL
DEPRECATED HBV CORE AB SER IA-ACNC: 1939 NG/ML
DEPRECATED RDW RBC AUTO: 56.5 FL (ref 35.1–46.3)
EGFRCR SERPLBLD CKD-EPI 2021: 11 ML/MIN/1.73M2 (ref 60–?)
EOSINOPHIL # BLD AUTO: 0.21 X10(3) UL (ref 0–0.7)
EOSINOPHIL NFR BLD AUTO: 1.9 %
ERYTHROCYTE [DISTWIDTH] IN BLOOD BY AUTOMATED COUNT: 16.9 % (ref 11–15)
EST. AVERAGE GLUCOSE BLD GHB EST-MCNC: 148 MG/DL (ref 68–126)
GLUCOSE BLD-MCNC: 184 MG/DL (ref 70–99)
GLUCOSE BLDC GLUCOMTR-MCNC: 136 MG/DL (ref 70–99)
GLUCOSE BLDC GLUCOMTR-MCNC: 143 MG/DL (ref 70–99)
GLUCOSE BLDC GLUCOMTR-MCNC: 169 MG/DL (ref 70–99)
GLUCOSE BLDC GLUCOMTR-MCNC: 174 MG/DL (ref 70–99)
GLUCOSE BLDC GLUCOMTR-MCNC: 208 MG/DL (ref 70–99)
HBA1C MFR BLD: 6.8 % (ref ?–5.7)
HBV SURFACE AG SER-ACNC: <0.1 [IU]/L
HBV SURFACE AG SERPL QL IA: NONREACTIVE
HCT VFR BLD AUTO: 21.2 %
HGB BLD-MCNC: 7 G/DL
IMM GRANULOCYTES # BLD AUTO: 0.09 X10(3) UL (ref 0–1)
IMM GRANULOCYTES NFR BLD: 0.8 %
IRON SATN MFR SERPL: 9 %
IRON SERPL-MCNC: 13 UG/DL
LYMPHOCYTES # BLD AUTO: 1.8 X10(3) UL (ref 1–4)
LYMPHOCYTES NFR BLD AUTO: 16.2 %
MAGNESIUM SERPL-MCNC: 2.4 MG/DL (ref 1.6–2.6)
MCH RBC QN AUTO: 31.3 PG (ref 26–34)
MCHC RBC AUTO-ENTMCNC: 33 G/DL (ref 31–37)
MCV RBC AUTO: 94.6 FL
MONOCYTES # BLD AUTO: 0.93 X10(3) UL (ref 0.1–1)
MONOCYTES NFR BLD AUTO: 8.4 %
NEUTROPHILS # BLD AUTO: 8.02 X10 (3) UL (ref 1.5–7.7)
NEUTROPHILS # BLD AUTO: 8.02 X10(3) UL (ref 1.5–7.7)
NEUTROPHILS NFR BLD AUTO: 72.2 %
OSMOLALITY SERPL CALC.SUM OF ELEC: 296 MOSM/KG (ref 275–295)
PHOSPHATE SERPL-MCNC: 2.1 MG/DL (ref 2.4–5.1)
PLATELET # BLD AUTO: 277 10(3)UL (ref 150–450)
POTASSIUM SERPL-SCNC: 3.8 MMOL/L (ref 3.5–5.1)
RBC # BLD AUTO: 2.24 X10(6)UL
SODIUM SERPL-SCNC: 133 MMOL/L (ref 136–145)
T PALLIDUM AB SER QL IA: NONREACTIVE
TIBC SERPL-MCNC: 143 UG/DL (ref 250–425)
TRANSFERRIN SERPL-MCNC: 96 MG/DL (ref 215–365)
WBC # BLD AUTO: 11.1 X10(3) UL (ref 4–11)

## 2024-12-08 PROCEDURE — 5A1D70Z PERFORMANCE OF URINARY FILTRATION, INTERMITTENT, LESS THAN 6 HOURS PER DAY: ICD-10-PCS | Performed by: INTERNAL MEDICINE

## 2024-12-08 PROCEDURE — 99233 SBSQ HOSP IP/OBS HIGH 50: CPT | Performed by: INTERNAL MEDICINE

## 2024-12-08 PROCEDURE — 99233 SBSQ HOSP IP/OBS HIGH 50: CPT | Performed by: HOSPITALIST

## 2024-12-08 RX ORDER — LIDOCAINE/PRILOCAINE 2.5 %-2.5%
CREAM (GRAM) TOPICAL AS NEEDED
Status: DISCONTINUED | OUTPATIENT
Start: 2024-12-08 | End: 2024-12-23

## 2024-12-08 RX ORDER — HEPARIN SODIUM 1000 [USP'U]/ML
1.5 INJECTION, SOLUTION INTRAVENOUS; SUBCUTANEOUS
Status: DISCONTINUED | OUTPATIENT
Start: 2024-12-08 | End: 2024-12-23

## 2024-12-08 RX ORDER — ALBUMIN (HUMAN) 12.5 G/50ML
25 SOLUTION INTRAVENOUS
Status: ACTIVE | OUTPATIENT
Start: 2024-12-08 | End: 2024-12-10

## 2024-12-08 NOTE — PLAN OF CARE
Problem: Patient Centered Care  Goal: Patient preferences are identified and integrated in the patient's plan of care  Description: Interventions:  - What would you like us to know as we care for you? I am from home with my wife  - Provide timely, complete, and accurate information to patient/family  - Incorporate patient and family knowledge, values, beliefs, and cultural backgrounds into the planning and delivery of care  - Encourage patient/family to participate in care and decision-making at the level they choose  - Honor patient and family perspectives and choices  Outcome: Progressing     Problem: Diabetes/Glucose Control  Goal: Glucose maintained within prescribed range  Description: INTERVENTIONS:  - Monitor Blood Glucose as ordered  - Assess for signs and symptoms of hyperglycemia and hypoglycemia  - Administer ordered medications to maintain glucose within target range  - Assess barriers to adequate nutritional intake and initiate nutrition consult as needed  - Instruct patient on self management of diabetes  Outcome: Progressing     Problem: Patient/Family Goals  Goal: Patient/Family Long Term Goal  Description: Patient's Long Term Goal: Discharge    Interventions:  - Monitor vitals  - Monitor appropriate labs  - Administer medications as ordered  - Follow MD's orders  - Update patient on plan of care   - Discharge planning     - See additional Care Plan goals for specific interventions  Outcome: Progressing  Goal: Patient/Family Short Term Goal  Description: Patient's Short Term Goal:     Interventions:   -   - See additional Care Plan goals for specific interventions  Outcome: Progressing     Problem: MUSCULOSKELETAL - ADULT  Goal: Return mobility to safest level of function  Description: INTERVENTIONS:  - Assess patient stability and activity tolerance for standing, transferring and ambulating w/ or w/o assistive devices  - Assist with transfers and ambulation using safe patient handling equipment as  needed  - Ensure adequate protection for wounds/incisions during mobilization  - Obtain PT/OT consults as needed  - Advance activity as appropriate  - Communicate ordered activity level and limitations with patient/family  Outcome: Progressing     Problem: PAIN - ADULT  Goal: Verbalizes/displays adequate comfort level or patient's stated pain goal  Description: INTERVENTIONS:  - Encourage pt to monitor pain and request assistance  - Assess pain using appropriate pain scale  - Administer analgesics based on type and severity of pain and evaluate response  - Implement non-pharmacological measures as appropriate and evaluate response  - Consider cultural and social influences on pain and pain management  - Manage/alleviate anxiety  - Utilize distraction and/or relaxation techniques  - Monitor for opioid side effects  - Notify MD/LIP if interventions unsuccessful or patient reports new pain  - Anticipate increased pain with activity and pre-medicate as appropriate  Outcome: Progressing     Problem: RISK FOR INFECTION - ADULT  Goal: Absence of fever/infection during anticipated neutropenic period  Description: INTERVENTIONS  - Monitor WBC  - Administer growth factors as ordered  - Implement neutropenic guidelines  Outcome: Progressing     Problem: SAFETY ADULT - FALL  Goal: Free from fall injury  Description: INTERVENTIONS:  - Assess pt frequently for physical needs  - Identify cognitive and physical deficits and behaviors that affect risk of falls.  - [place] fall precautions as indicated by assessment.  - Educate pt/family on patient safety including physical limitations  - Instruct pt to call for assistance with activity based on assessment  - Modify environment to reduce risk of injury  - Provide assistive devices as appropriate  - Consider OT/PT consult to assist with strengthening/mobility  - Encourage toileting schedule  Outcome: Progressing     Problem: DISCHARGE PLANNING  Goal: Discharge to home or other  facility with appropriate resources  Description: INTERVENTIONS:  - Identify barriers to discharge w/pt and caregiver  - Include patient/family/discharge partner in discharge planning  - Arrange for needed discharge resources and transportation as appropriate  - Identify discharge learning needs (meds, wound care, etc)  - Arrange for interpreters to assist at discharge as needed  - Consider post-discharge preferences of patient/family/discharge partner  - Complete POLST form as appropriate  - Assess patient's ability to be responsible for managing their own health  - Refer to Case Management Department for coordinating discharge planning if the patient needs post-hospital services based on physician/LIP order or complex needs related to functional status, cognitive ability or social support system  Outcome: Progressing

## 2024-12-08 NOTE — DIETARY NOTE
ADULT NUTRITION UPDATE NOTE    RECOMMENDATIONS TO MD: See Nutrition Intervention for enteral nutrition specifics     PATIENT STATUS:   12/08/24 UPDATE: Pt discussed with RN via phone. Adjusted EN feeds to home regimen, bolus feeds, now that pt is tolerating feeds at goal rate. RN to hold continuous feeds now and start with first bolus at 1600 hrs.      NUTRITION INTERVENTION:   NUTRITION PRESCRIPTION:   Estimated Nutrition needs: --dosing wt of 67.4 kg - wt taken on 12/6/24  Calories: 4827-1117 calories/day (25-30 calories per kg Dosing wt)  Protein: 67-81 g protein/day (1.0-1.2 g protein/kg Dosing wt)  Fluid Needs: 1348 ml (20ml/kg dosing wt). Adjust per clinical status (ESRD)    - Diet:       Procedures    NPO     - Enteral Nutrition: Transition to bolus feeds. Nepro 4.5 cartons daily (1 @ 0800, 1.5 @ 1200, 1 @ 1600, 1 @ 2000).Goal rate provides 1890 kcal, 85 grams protein, 774 ml total free water, and 100% RDI's, 100% calorie needs and 100% protein needs. FWF 50 ml before and after each feeding. Total water: 1174 ml total water.    RD to follow    Birgit Malagon MS, RD, LDN, CNSC  [phone]

## 2024-12-08 NOTE — PLAN OF CARE
Problem: Patient Centered Care  Goal: Patient preferences are identified and integrated in the patient's plan of care  Description: Interventions:  - What would you like us to know as we care for you? I am from home with my wife  - Provide timely, complete, and accurate information to patient/family  - Incorporate patient and family knowledge, values, beliefs, and cultural backgrounds into the planning and delivery of care  - Encourage patient/family to participate in care and decision-making at the level they choose  - Honor patient and family perspectives and choices  Outcome: Progressing     Problem: Diabetes/Glucose Control  Goal: Glucose maintained within prescribed range  Description: INTERVENTIONS:  - Monitor Blood Glucose as ordered  - Assess for signs and symptoms of hyperglycemia and hypoglycemia  - Administer ordered medications to maintain glucose within target range  - Assess barriers to adequate nutritional intake and initiate nutrition consult as needed  - Instruct patient on self management of diabetes  Outcome: Progressing     Problem: Patient/Family Goals  Goal: Patient/Family Long Term Goal  Description: Patient's Long Term Goal: Discharge    Interventions:  - Monitor vitals  - Monitor appropriate labs  - Administer medications as ordered  - Follow MD's orders  - Update patient on plan of care   - Discharge planning     - See additional Care Plan goals for specific interventions  Outcome: Progressing  Goal: Patient/Family Short Term Goal  Description: Patient's Short Term Goal:     Interventions:   -   - See additional Care Plan goals for specific interventions  Outcome: Progressing     Problem: MUSCULOSKELETAL - ADULT  Goal: Return mobility to safest level of function  Description: INTERVENTIONS:  - Assess patient stability and activity tolerance for standing, transferring and ambulating w/ or w/o assistive devices  - Assist with transfers and ambulation using safe patient handling equipment as  needed  - Ensure adequate protection for wounds/incisions during mobilization  - Obtain PT/OT consults as needed  - Advance activity as appropriate  - Communicate ordered activity level and limitations with patient/family  Outcome: Progressing     Problem: PAIN - ADULT  Goal: Verbalizes/displays adequate comfort level or patient's stated pain goal  Description: INTERVENTIONS:  - Encourage pt to monitor pain and request assistance  - Assess pain using appropriate pain scale  - Administer analgesics based on type and severity of pain and evaluate response  - Implement non-pharmacological measures as appropriate and evaluate response  - Consider cultural and social influences on pain and pain management  - Manage/alleviate anxiety  - Utilize distraction and/or relaxation techniques  - Monitor for opioid side effects  - Notify MD/LIP if interventions unsuccessful or patient reports new pain  - Anticipate increased pain with activity and pre-medicate as appropriate  Outcome: Progressing     Problem: RISK FOR INFECTION - ADULT  Goal: Absence of fever/infection during anticipated neutropenic period  Description: INTERVENTIONS  - Monitor WBC  - Administer growth factors as ordered  - Implement neutropenic guidelines  Outcome: Progressing     Problem: SAFETY ADULT - FALL  Goal: Free from fall injury  Description: INTERVENTIONS:  - Assess pt frequently for physical needs  - Identify cognitive and physical deficits and behaviors that affect risk of falls.  - [place] fall precautions as indicated by assessment.  - Educate pt/family on patient safety including physical limitations  - Instruct pt to call for assistance with activity based on assessment  - Modify environment to reduce risk of injury  - Provide assistive devices as appropriate  - Consider OT/PT consult to assist with strengthening/mobility  - Encourage toileting schedule  Outcome: Progressing     Problem: DISCHARGE PLANNING  Goal: Discharge to home or other  facility with appropriate resources  Description: INTERVENTIONS:  - Identify barriers to discharge w/pt and caregiver  - Include patient/family/discharge partner in discharge planning  - Arrange for needed discharge resources and transportation as appropriate  - Identify discharge learning needs (meds, wound care, etc)  - Arrange for interpreters to assist at discharge as needed  - Consider post-discharge preferences of patient/family/discharge partner  - Complete POLST form as appropriate  - Assess patient's ability to be responsible for managing their own health  - Refer to Case Management Department for coordinating discharge planning if the patient needs post-hospital services based on physician/LIP order or complex needs related to functional status, cognitive ability or social support system  Outcome: Progressing     Patient is presently resting in the bed. Alert x 4. Vital signs taken and stable. Patient is medicated as needed for pain or discomfort. Fall risk precautions are followed at all times. Patient is strict NPO. Patient was started on tube feedings by G-tube per order. Patient is turn and repositioned every 2 hours. Prompt care is given to incontinence. Kept clean and dry. Bed alarm in use at all times. Call light within reach.

## 2024-12-08 NOTE — PHYSICAL THERAPY NOTE
PHYSICAL THERAPY EVALUATION - INPATIENT     Room Number: 553/553-A  Evaluation Date: 2024  Type of Evaluation: Initial   Physician Order: PT Eval and Treat    Presenting Problem: peripheral neuropathy  Co-Morbidities : BLE leg pain  Reason for Therapy: Mobility Dysfunction and Discharge Planning    PHYSICAL THERAPY ASSESSMENT   Patient is a 79 year old male admitted 2024 for peripheral neuropathy.  Prior to admission, patient's baseline is assist for ADL's and ambulation with rolling walker short distances.  Patient is currently functioning below baseline with bed mobility, transfers, and gait.  Patient is requiring moderate assist and assist of 2  as a result of the following impairments: decreased functional strength and medical status.  Physical Therapy will continue to follow for duration of hospitalization. Has wheelchair at home.     Patient will benefit from continued skilled PT Services to promote return to prior level of function and safety with continuous assistance and gradual rehabilitative therapy .    PLAN DURING HOSPITALIZATION  Nursing Mobility Recommendation : Lift Equipment  PT Device Recommendation: Rolling walker  PT Treatment Plan: Body mechanics;Bed mobility;Patient education;Gait training;Transfer training;Balance training  Rehab Potential : Fair  Frequency (Obs): 3-5x/week     PHYSICAL THERAPY MEDICAL/SOCIAL HISTORY   Problem List  Principal Problem:    Peripheral polyneuropathy  Active Problems:    ESRD on hemodialysis (HCC)    HOME SITUATION  Type of Home: House                        Lives With: Spouse    Drives: No   Patient Regularly Uses: None     SUBJECTIVE  \"I don't feel well after dialysis\"    PHYSICAL THERAPY EXAMINATION   OBJECTIVE  Precautions: Bed/chair alarm  Fall Risk: High fall risk    PAIN ASSESSMENT  Ratin          COGNITION  Overall Cognitive Status:  WFL - within functional limits    RANGE OF MOTION AND STRENGTH ASSESSMENT  Lower extremity ROM is within  functional limits  BLE WNL  Lower extremity strength is within functional limits  BLE WNL    BALANCE  Static Sitting: Fair  Dynamic Sitting: Fair -  Static Standing: Poor +  Dynamic Standing: Poor    AM-PAC '6-Clicks' INPATIENT SHORT FORM - BASIC MOBILITY  How much difficulty does the patient currently have...  Patient Difficulty: Turning over in bed (including adjusting bedclothes, sheets and blankets)?: A Lot   Patient Difficulty: Sitting down on and standing up from a chair with arms (e.g., wheelchair, bedside commode, etc.): A Lot   Patient Difficulty: Moving from lying on back to sitting on the side of the bed?: A Lot   How much help from another person does the patient currently need...   Help from Another: Moving to and from a bed to a chair (including a wheelchair)?: A Lot   Help from Another: Need to walk in hospital room?: Total   Help from Another: Climbing 3-5 steps with a railing?: Total     AM-PAC Score:  Raw Score: 10   Approx Degree of Impairment: 76.75%   Standardized Score (AM-PAC Scale): 32.29   CMS Modifier (G-Code): CL    FUNCTIONAL ABILITY STATUS  Functional Mobility/Gait Assessment  Gait Assistance: Not tested  Rolling:  not tested   Supine to Sit: moderate assist and assist of 2  Sit to Supine:  not tested   Sit to Stand: moderate assist and assist of 2    Exercise/Education Provided:  Education Provided To: Patient  Patient Education: Role of Physical Therapy;Plan of Care  Patient's Response to Education: Verbalized Understanding           Skilled Therapy Provided: Patient on room air, just finished HD, blood pressure stable, patient agreeable to transfer to bedside chair. Patient currently with mod A x 2 for mobility during the session with therapist assist. Patient able to transfer to bedside chair, able to take steps with bilateral hand held assist. Patient states he does not need rolling walker, will need rolling walker for ambulation. Patient reports feeling better in chair. The  patient's Approx Degree of Impairment: 76.75% has been calculated based on documentation in the [facility] '6 clicks' Inpatient Basic Mobility Short Form.  Research supports that patients with this level of impairment may benefit from long term care, however patient would benefit from rehab facility. Patient received semi-fowlers in bed, agreeable to physical therapy evaluation. Next session anticipate to progress bed mobility, transfers, and gait.    Patient history and/or personal factors that may impact the plan of care include home accessibility concerns, limited functional status at baseline, and has history of recent hospitalization. Based on the physical therapy examination of the noted systems and functional activity/participation limitations, the patient presentation is evolving given the patient presents with symptoms that are unchanging/predictable, demonstrates worsening of previously stable condition, and demonstrates worsening of co-morbidities.     Final disposition will be made by interdisciplinary medical team.    Patient End of Session: Up in chair;Needs met;Call light within reach;RN aware of session/findings;All patient questions and concerns addressed;Family present    CURRENT GOALS  Goals to be met by: 12/20/24  Patient Goal Patient's self-stated goal is: to go home   Goal #1 Patient is able to demonstrate supine - sit EOB @ level: minimum assistance     Goal #1   Current Status    Goal #2 Patient is able to demonstrate transfers Sit to/from Stand at assistance level: minimum assistance with walker - rolling     Goal #2  Current Status    Goal #3 Patient is able to ambulate 10 feet with assist device: walker - rolling at assistance level: minimum assistance   Goal #3   Current Status    Goal #4    Goal #4   Current Status    Goal #5 Patient to demonstrate independence with home activity/exercise instructions provided to patient in preparation for discharge.   Goal #5   Current Status    Goal #6     Goal #6  Current Status      Patient Evaluation Complexity Level:  History Moderate - 1 or 2 personal factors and/or co-morbidities   Examination of body systems Moderate - addressing a total of 3 or more elements   Clinical Presentation  Moderate - Evolving   Clinical Decision Making  Moderate Complexity     Therapeutic Activity:  25 minutes

## 2024-12-08 NOTE — PROGRESS NOTES
[facility]  part of [facility]    Progress Note    Ollie Hernández Patient Status:  Inpatient    1945 MRN [id number]   Location [facility] 5SW/SE Attending Dee Joyce,*   Hosp Day # 2 PCP Wili Parmar MD     Chief Complaint: foot pain    Subjective:     Unable to perform ROS: Acuity of condition       Objective:   Blood pressure 104/40, pulse 72, temperature 99.3 °F (37.4 °C), temperature source Oral, resp. rate 18, height 5' 4\" (1.626 m), weight 148 lb 1.6 oz (67.2 kg), SpO2 95%.  Physical Exam  Vitals and nursing note reviewed.   Constitutional:       General: He is not in acute distress.     Appearance: He is ill-appearing. He is not toxic-appearing or diaphoretic.   Cardiovascular:      Rate and Rhythm: Normal rate.      Pulses: Normal pulses.   Pulmonary:      Breath sounds: Rhonchi present. No wheezing.   Abdominal:      General: Bowel sounds are normal.      Palpations: Abdomen is soft.   Skin:     General: Skin is warm and dry.      Capillary Refill: Capillary refill takes less than 2 seconds.   Neurological:      General: No focal deficit present.      Mental Status: He is alert.      Comments: sleepy   Psychiatric:         Behavior: Behavior normal.         Results:   Lab Results   Component Value Date    WBC 11.1 (H) 2024    HGB 7.0 (L) 2024    HCT 21.2 (L) 2024    .0 2024    CREATSERUM 5.29 (H) 2024    BUN 54 (H) 2024     (L) 2024    K 3.8 2024    CL 94 (L) 2024    CO2 32.0 2024     (H) 2024    CA 9.2 2024    ALB 3.0 (L) 2024    ALKPHO 116 2024    BILT 0.3 2024    TP 6.7 2024    AST 32 2024    ALT 29 2024    PTT 30.1 2024    INR 1.45 (H) 2024    T4F 0.9 2022    TSH 2.844 2024     (H) 2024    PSA 2.94 10/20/2021    DDIMER 6.07 (H) 2024    ESRML 10 2024    CRP 1.30 (H) 2024     MG 2.4 12/08/2024    PHOS 2.1 (L) 12/08/2024    TROP <0.045 07/25/2019    TROPHS 52 12/03/2024     08/05/2023    B12 672 07/04/2024       XR CHEST AP PORTABLE  (CPT=71045)    Result Date: 12/6/2024  CONCLUSION:  1. Interval worsening in the chest with mild cardiomegaly and mild to moderate pulmonary congestive change interstitial edema suggesting cardiac failure/fluid overload.  Questionable small right pleural effusion.  I cannot definitely exclude underlying pneumonia/inflammatory process.  Correlate clinically and follow-up studies are advised.    Dictated by (CST): Ramón Schmidt MD on 12/06/2024 at 2:15 PM     Finalized by (CST): Ramón Schmidt MD on 12/06/2024 at 2:16 PM               Assessment & Plan:     Peripheral polyneuropathy; checked rpr ok; thyroid; electropheresis; b12 ok 7/24  -Patient p/w bilateral lower extremity pain  -Significantly worse over the past 3 to 4 days.  -No real improvement with gabapentin at home  -Difficulties with ambulation because of pain  -Pain control as able; pt screaming in pain or asleep; pain clinic saw  -PT/OT  -Continue to monitor     ESRD on HD  -Normally receives dialysis on Monday Wednesday Friday.  Last session was on Wednesday.  -Nephrology consulted, further HD per nephrology.     Anemia of chronic disease  -Likely secondary to ESRD as above  -Hemoglobin noted to be 7.7  -Continue to monitor     Type 2 DM   - Monitoring Blood glucose with POC checks  - SSI to cover hyperglycemia  - Hypoglycemia protocol  - Will monitor and adjust agents as needed.      -KRAIG  -Hypertension  -Chronic dCHF  -Pulm HTN    Afib has watchman can have mri per cards    Severe ls disk disease in 2023       Patient will require inpatient services that will reasonably be expected to span two midnight's based on the clinical documentation in H+P.   Based on patients current state of illness, I anticipate that, after discharge, patient will require TBD.  Complex mdm; coordinating care  with nurse/pain clinic and counseling pt and with his permission his wife in room about labs/neuropathy    BRITTANY WAY MD

## 2024-12-08 NOTE — PROGRESS NOTES
[facility]  part of [facility]    Progress Note    Ollie Hernández Patient Status:  Inpatient    1945 MRN [id number]   Location [facility] 5SW/SE Attending Dee Joyce,*   Hosp Day # 2 PCP Wili Parmar MD       Subjective:   Ollie Hernández is a(n) 79 year old male     ROS:     Constitutional:  Negative for decreased activity, fever, irritability and lethargy  ENMT:  Negative for ear drainage, hearing loss and nasal drainage  Eyes:  Negative for eye discharge and vision loss  Cardiovascular:  Negative for chest pain, sob, irregular heartbeat/palpitations  Respiratory:  Negative for cough, dyspnea and wheezing  Gastrointestinal:  Negative for abdominal pain, constipation, decreased appetite, diarrhea and vomiting  Genitourinary:  Negative for dysuria and hematuria  Endocrine:  Negative for abnormal sleep patterns, increased activity, polydipsia and polyphagia  Hema/Lymph:  Negative for easy bleeding and easy bruising  Integumentary:  Negative for pruritus and rash  Musculoskeletal:  Negative for bone/joint symptoms  Neurological continue neuropathy bilaterally in the feet  Psychiatric:  Negative for inappropriate interaction and psychiatric symptoms      Vitals:    24 0830   BP: 104/40   Pulse: 72   Resp: 18   Temp: 99.3 °F (37.4 °C)           PHYSICAL EXAM:   Constitutional: appears well hydrated alert and responsive no acute distress noted  Head/Face: normocephalic  Eyes/Vision: normal extraocular motion is intact  Nose/Mouth/Throat:mucous membranes are moist and no oral lesions are noted  Neck/Thyroid: neck is supple without adenopathy  Lymphatic: no abnormal cervical, supraclavicular adenopathy is noted  Respiratory:  lungs are clear to auscultation bilaterally, normal respiratory effort  Cardiovascular: regular rate and rhythm no murmurs, gallups, or rubs  Abdomen: soft, non-tender, non-distended, BS normal  Vascular: well perfused femoral, and pedal  pulses normal  Skin/Hair: no unusual rashes present, no abnormal bruising noted  Back/Spine: no abnormalities noted  Musculoskeletal: full ROM all extremities good strength  no deformities  Extremities: no edema, cyanosis  Neurological:  Grossly normal    Results:     Laboratory Data:  Lab Results   Component Value Date    WBC 11.1 (H) 12/08/2024    HGB 7.0 (L) 12/08/2024    HCT 21.2 (L) 12/08/2024    .0 12/08/2024    CREATSERUM 5.29 (H) 12/08/2024    BUN 54 (H) 12/08/2024     (L) 12/08/2024    K 3.8 12/08/2024    CL 94 (L) 12/08/2024    CO2 32.0 12/08/2024     (H) 12/08/2024    CA 9.2 12/08/2024    ALB 3.0 (L) 12/08/2024    ALKPHO 116 12/07/2024    BILT 0.3 12/07/2024    TP 6.7 12/07/2024    AST 32 12/07/2024    ALT 29 12/07/2024    PTT 30.1 08/09/2024    INR 1.45 (H) 08/09/2024    T4F 0.9 08/31/2022    TSH 2.844 07/04/2024     (H) 07/30/2024    PSA 2.94 10/20/2021    DDIMER 6.07 (H) 09/29/2024    ESRML 10 06/16/2024    CRP 1.30 (H) 06/16/2024    MG 2.4 12/08/2024    PHOS 2.1 (L) 12/08/2024    TROP <0.045 07/25/2019     08/05/2023    B12 672 07/04/2024       Imaging:  [unfilled]   No results found.      ASSESSMENT/PLAN:   Assessment       1 ESRD had dialysis this morning due again tomorrow to get back on schedule  #2 neuropathy workup in progress pain management per Dr. Mari  Coronary disease    4 history of GI bleeding anemia iron sat 9% Epogen ordered iron ordered   Hemoglobin of 7  Hopefully not bleeding  Is on aspirin and Plavix    Continue present plan  12/8/2024  José Callahan MD

## 2024-12-09 LAB
ALBUMIN SERPL ELPH-MCNC: 2.25 G/DL (ref 3.75–5.21)
ALBUMIN/GLOB SERPL: 0.65 {RATIO} (ref 1–2)
ALPHA1 GLOB SERPL ELPH-MCNC: 0.46 G/DL (ref 0.19–0.46)
ALPHA2 GLOB SERPL ELPH-MCNC: 0.79 G/DL (ref 0.48–1.05)
ANION GAP SERPL CALC-SCNC: 2 MMOL/L (ref 0–18)
B-GLOBULIN SERPL ELPH-MCNC: 0.76 G/DL (ref 0.68–1.23)
BASOPHILS # BLD AUTO: 0.04 X10(3) UL (ref 0–0.2)
BASOPHILS NFR BLD AUTO: 0.4 %
BUN BLD-MCNC: 27 MG/DL (ref 9–23)
BUN/CREAT SERPL: 8.7 (ref 10–20)
C DIFF TOX B STL QL: NEGATIVE
CALCIUM BLD-MCNC: 9.3 MG/DL (ref 8.7–10.4)
CHLORIDE SERPL-SCNC: 99 MMOL/L (ref 98–112)
CO2 SERPL-SCNC: 36 MMOL/L (ref 21–32)
CREAT BLD-MCNC: 3.09 MG/DL
DEPRECATED RDW RBC AUTO: 57.4 FL (ref 35.1–46.3)
EGFRCR SERPLBLD CKD-EPI 2021: 20 ML/MIN/1.73M2 (ref 60–?)
EOSINOPHIL # BLD AUTO: 0.28 X10(3) UL (ref 0–0.7)
EOSINOPHIL NFR BLD AUTO: 2.7 %
ERYTHROCYTE [DISTWIDTH] IN BLOOD BY AUTOMATED COUNT: 17.2 % (ref 11–15)
GAMMA GLOB SERPL ELPH-MCNC: 1.44 G/DL (ref 0.62–1.7)
GLUCOSE BLD-MCNC: 114 MG/DL (ref 70–99)
GLUCOSE BLDC GLUCOMTR-MCNC: 143 MG/DL (ref 70–99)
GLUCOSE BLDC GLUCOMTR-MCNC: 203 MG/DL (ref 70–99)
GLUCOSE BLDC GLUCOMTR-MCNC: 215 MG/DL (ref 70–99)
GLUCOSE BLDC GLUCOMTR-MCNC: 223 MG/DL (ref 70–99)
GLUCOSE BLDC GLUCOMTR-MCNC: 99 MG/DL (ref 70–99)
HBV CORE AB SERPL QL IA: NONREACTIVE
HBV SURFACE AB SER QL: REACTIVE
HBV SURFACE AB SERPL IA-ACNC: 82.08 MIU/ML
HBV SURFACE AG SER-ACNC: <0.1 [IU]/L
HBV SURFACE AG SERPL QL IA: NONREACTIVE
HCT VFR BLD AUTO: 22.4 %
HEMOCCULT STL QL: NEGATIVE
HGB BLD-MCNC: 7.1 G/DL
IMM GRANULOCYTES # BLD AUTO: 0.07 X10(3) UL (ref 0–1)
IMM GRANULOCYTES NFR BLD: 0.7 %
LYMPHOCYTES # BLD AUTO: 1.66 X10(3) UL (ref 1–4)
LYMPHOCYTES NFR BLD AUTO: 16 %
MAGNESIUM SERPL-MCNC: 2.3 MG/DL (ref 1.6–2.6)
MCH RBC QN AUTO: 30.2 PG (ref 26–34)
MCHC RBC AUTO-ENTMCNC: 31.7 G/DL (ref 31–37)
MCV RBC AUTO: 95.3 FL
MONOCYTES # BLD AUTO: 0.89 X10(3) UL (ref 0.1–1)
MONOCYTES NFR BLD AUTO: 8.6 %
NEUTROPHILS # BLD AUTO: 7.43 X10 (3) UL (ref 1.5–7.7)
NEUTROPHILS # BLD AUTO: 7.43 X10(3) UL (ref 1.5–7.7)
NEUTROPHILS NFR BLD AUTO: 71.6 %
OSMOLALITY SERPL CALC.SUM OF ELEC: 290 MOSM/KG (ref 275–295)
PHOSPHATE SERPL-MCNC: 1.6 MG/DL (ref 2.4–5.1)
PLATELET # BLD AUTO: 249 10(3)UL (ref 150–450)
POTASSIUM SERPL-SCNC: 3.4 MMOL/L (ref 3.5–5.1)
PROT SERPL-MCNC: 5.7 G/DL (ref 5.7–8.2)
RBC # BLD AUTO: 2.35 X10(6)UL
SODIUM SERPL-SCNC: 137 MMOL/L (ref 136–145)
WBC # BLD AUTO: 10.4 X10(3) UL (ref 4–11)

## 2024-12-09 PROCEDURE — 99233 SBSQ HOSP IP/OBS HIGH 50: CPT | Performed by: HOSPITALIST

## 2024-12-09 PROCEDURE — 99232 SBSQ HOSP IP/OBS MODERATE 35: CPT | Performed by: INTERNAL MEDICINE

## 2024-12-09 RX ORDER — LORAZEPAM 0.5 MG/1
0.5 TABLET ORAL EVERY 6 HOURS PRN
Status: DISCONTINUED | OUTPATIENT
Start: 2024-12-09 | End: 2024-12-23

## 2024-12-09 NOTE — PROGRESS NOTES
[facility]  part of [facility]    Nephrology Progress Note    Chief Complaint   Patient presents with    Foot Pain       Subjective:   79 year old male, ESRD on HD.     Complains of pain in legs.   Had HD earlier today.     Review of Systems:   Review of Systems   Constitutional:  Negative for chills, fatigue and fever.   Respiratory:  Negative for cough and shortness of breath.    Cardiovascular:  Negative for chest pain and leg swelling.   Gastrointestinal:  Negative for abdominal pain, constipation, diarrhea, nausea and vomiting.   Genitourinary:  Negative for difficulty urinating, dysuria and flank pain.   Musculoskeletal:  Positive for arthralgias.       Objective:   Temp:  [98.9 °F (37.2 °C)-99.5 °F (37.5 °C)] 98.9 °F (37.2 °C)  Pulse:  [67-72] 67  Resp:  [18] 18  BP: (101-118)/(40-72) 118/72  SpO2:  [91 %-96 %] 96 %  SpO2: 96 %     Intake/Output Summary (Last 24 hours) at 12/9/2024 0919  Last data filed at 12/9/2024 0615  Gross per 24 hour   Intake 497 ml   Output 0 ml   Net 497 ml     Wt Readings from Last 3 Encounters:   12/09/24 147 lb 14.4 oz (67.1 kg)   11/30/24 145 lb (65.8 kg)   10/22/24 132 lb 14.4 oz (60.3 kg)     Physical Exam  Constitutional:       Appearance: Normal appearance.   Cardiovascular:      Rate and Rhythm: Normal rate and regular rhythm.      Heart sounds: Normal heart sounds.      Comments: R upper arm AVF-pos bruit/thrill  Pulmonary:      Effort: Pulmonary effort is normal.      Breath sounds: Normal breath sounds.   Musculoskeletal:         General: Normal range of motion.   Skin:     General: Skin is warm and dry.   Neurological:      General: No focal deficit present.      Mental Status: He is alert and oriented to person, place, and time.   Psychiatric:         Mood and Affect: Mood normal.         Behavior: Behavior normal.         Medications:  Current Facility-Administered Medications   Medication Dose Route Frequency    sodium ferric gluconate (Ferrlecit)  125 mg in sodium chloride 0.9% 100mL IVPB premix  125 mg Intravenous Daily    heparin (Porcine) 1000 UNIT/ML injection 1,500 Units  1.5 mL Intravenous PRN Dialysis    lidocaine-prilocaine (Emla) 2.5-2.5 % cream   Topical PRN    sodium chloride 0.9 % IV bolus 100 mL  100 mL Intravenous Q30 Min PRN    And    albumin human (Albumin) 25% injection 25 g  25 g Intravenous PRN Dialysis    dextrose 10% infusion (TPN no rate)   Intravenous Continuous PRN    pancrelipase (Lip-Prot-Amyl) (Zenpep) DR particles cap 10,000 Units  10,000 Units Per G Tube PRN    And    sodium bicarbonate tab 325 mg  325 mg Oral PRN    lidocaine-menthol 4-1 % patch 1 patch  1 patch Transdermal Daily    metoprolol (Lopressor) 5 mg/5mL injection 5 mg  5 mg Intravenous PRN    Or    metoprolol (Lopressor) 5 mg/5mL injection 2.5 mg  2.5 mg Intravenous PRN    heparin (Porcine) 1000 UNIT/ML injection 1,500 Units  1.5 mL Intravenous PRN Dialysis    lidocaine-prilocaine (Emla) 2.5-2.5 % cream   Topical PRN    sodium chloride 0.9 % IV bolus 100 mL  100 mL Intravenous Q30 Min PRN    And    albumin human (Albumin) 25% injection 25 g  25 g Intravenous PRN Dialysis    epoetin donna (Epogen, Procrit) 85629 UNIT/ML injection 10,000 Units  10,000 Units Subcutaneous Once per day on Monday Wednesday Friday    gabapentin (Neurontin) 250 MG/5ML oral solution 100 mg  100 mg Per G Tube BID    nortriptyline (Pamelor) cap 10 mg  10 mg Oral Nightly    HYDROmorphone (Dilaudid) 1 MG/ML injection 0.1 mg  0.1 mg Intravenous Q2H PRN    Or    HYDROmorphone (Dilaudid) 1 MG/ML injection 0.2 mg  0.2 mg Intravenous Q2H PRN    Or    HYDROmorphone (Dilaudid) 1 MG/ML injection 0.4 mg  0.4 mg Intravenous Q2H PRN    amiodarone (Pacerone) tab 200 mg  200 mg Per G Tube Daily    aspirin DR tab 81 mg  81 mg Oral Daily    atorvastatin (Lipitor) tab 40 mg  40 mg Per G Tube Nightly    carvedilol (Coreg) tab 25 mg  25 mg Per G Tube BID    clopidogrel (Plavix) tab 75 mg  75 mg Oral Daily     fluticasone propionate (Flonase) 50 MCG/ACT nasal suspension 2 spray  2 spray Nasal Daily    insulin degludec (Tresiba) 100 units/mL flextouch 5 Units  5 Units Subcutaneous Nightly    lansoprazole (Prevacid Solutab) disintegrating tab 30 mg  30 mg Per G Tube QAM AC    glucose (Dex4) 15 GM/59ML oral liquid 15 g  15 g Oral Q15 Min PRN    Or    glucose (Glutose) 40% oral gel 15 g  15 g Oral Q15 Min PRN    Or    glucose-vitamin C (Dex-4) chewable tab 4 tablet  4 tablet Oral Q15 Min PRN    Or    dextrose 50% injection 50 mL  50 mL Intravenous Q15 Min PRN    Or    glucose (Dex4) 15 GM/59ML oral liquid 30 g  30 g Oral Q15 Min PRN    Or    glucose (Glutose) 40% oral gel 30 g  30 g Oral Q15 Min PRN    Or    glucose-vitamin C (Dex-4) chewable tab 8 tablet  8 tablet Oral Q15 Min PRN    heparin (Porcine) 5000 UNIT/ML injection 5,000 Units  5,000 Units Subcutaneous Q8H STEPHANIE    acetaminophen (Tylenol Extra Strength) tab 500 mg  500 mg Oral Q4H PRN    acetaminophen (Tylenol) tab 650 mg  650 mg Oral Q4H PRN    Or    HYDROcodone-acetaminophen (Norco) 5-325 MG per tab 1 tablet  1 tablet Oral Q4H PRN    polyethylene glycol (PEG 3350) (Miralax) 17 g oral packet 17 g  17 g Oral Daily PRN    sennosides (Senokot) tab 17.2 mg  17.2 mg Oral Nightly PRN    bisacodyl (Dulcolax) 10 MG rectal suppository 10 mg  10 mg Rectal Daily PRN    insulin aspart (NovoLOG) 100 Units/mL FlexPen 1-7 Units  1-7 Units Subcutaneous TID CC and HS              Results:     Recent Labs   Lab 12/07/24  0548 12/08/24  0549 12/09/24  0546   RBC 2.70* 2.24* 2.35*   HGB 8.2* 7.0* 7.1*   HCT 25.3* 21.2* 22.4*   MCV 93.7 94.6 95.3   NEPRELIM 8.36* 8.02* 7.43   WBC 11.1* 11.1* 10.4   .0 277.0 249.0     Recent Labs   Lab 12/03/24  0907 12/06/24  1359 12/07/24  0547 12/08/24  0158 12/08/24  0549 12/09/24  0546   * 196* 129*  --  184* 114*   BUN 29* 37* 37*  --  54* 27*   CREATSERUM 3.32* 4.04* 4.43*  --  5.29* 3.09*   CA 9.6 9.5 9.6  --  9.2 9.3   ALB 3.4 3.2  3.3 2.25* 3.0*  --    * 133* 133*  --  133* 137   K 3.5 4.0 3.9  --  3.8 3.4*   CL 95* 92* 95*  --  94* 99   CO2 37.0* 35.0* 32.0  --  32.0 36.0*   ALKPHO 124* 123* 116  --   --   --    AST 71* 42* 32  --   --   --    ALT 57* 38 29  --   --   --    BILT 0.2 0.2 0.3  --   --   --    TP 7.1 6.9 6.7 5.7  --   --      PTT   Date Value Ref Range Status   08/09/2024 30.1 23.0 - 36.0 seconds Final     INR   Date Value Ref Range Status   08/09/2024 1.45 (H) 0.80 - 1.20 Final     Comment:     Therapeutic INR range for patients on warfarin:  2.0-3.0 for most medical conditions and surgical prophylaxis   2.5-3.5 for mechanical heart valves and recurrent thromboembolism    Direct thrombin inhibitors (e.g. argatroban, bivalirudin), factor Xa inhibitors (e.g. apixaban, rivaroxaban), and conditions such as coagulation factor deficiency, vitamin K deficiency, and liver disease will prolong the prothrombin time/INR.    Unfractionated heparin and low molecular weight heparin do not affect the prothrombin time/INR up to a concentration of 1 IU/mL and 1.5 IU/mL, respectively.         No results for input(s): \"BNP\" in the last 168 hours.  Recent Labs   Lab 12/07/24  0547 12/08/24  0549 12/09/24  0546   MG 2.4 2.4 2.3   PHOS  --  2.1* 1.6*        Recent Labs   Lab 12/07/24  0547 12/08/24  0158 12/08/24  0549 12/09/24  0546   PHOS  --   --  2.1* 1.6*   ALB 3.3 2.25* 3.0*  --          Assessment and Plan:     79 year old male with past medical history of T2DM, HTN, HLD, ESRD on HD MWF via R AVF, CAD s/p PCI (5/2024), A.fib on Eliquis, HFpEF, KRAIG, BPH, history of recurrent gastrointestinal bleeding, who presented with bilateral lower extremity pain.      ESRD HD MWF:   HD today.      Bilateral lower extremity pain:   Work up neg so far.   On Gabapentin for neuropathy.   Pain control.      CAD/A.fib:   Cont Coreg, ASA/plavix.   Cont amiodarone.      Anemia:   Hb 7.1 today.   Continue EPO 10,000 units 3 times per week.      Mirta  MD Feroz  12/9/2024

## 2024-12-09 NOTE — PLAN OF CARE
Problem: Patient Centered Care  Goal: Patient preferences are identified and integrated in the patient's plan of care  Description: Interventions:  - What would you like us to know as we care for you? I am from home with my wife  - Provide timely, complete, and accurate information to patient/family  - Incorporate patient and family knowledge, values, beliefs, and cultural backgrounds into the planning and delivery of care  - Encourage patient/family to participate in care and decision-making at the level they choose  - Honor patient and family perspectives and choices  Outcome: Progressing     Problem: Diabetes/Glucose Control  Goal: Glucose maintained within prescribed range  Description: INTERVENTIONS:  - Monitor Blood Glucose as ordered  - Assess for signs and symptoms of hyperglycemia and hypoglycemia  - Administer ordered medications to maintain glucose within target range  - Assess barriers to adequate nutritional intake and initiate nutrition consult as needed  - Instruct patient on self management of diabetes  Outcome: Progressing     Problem: Patient/Family Goals  Goal: Patient/Family Long Term Goal  Description: Patient's Long Term Goal: Discharge    Interventions:  - Monitor vitals  - Monitor appropriate labs  - Administer medications as ordered  - Follow MD's orders  - Update patient on plan of care   - Discharge planning     - See additional Care Plan goals for specific interventions  Outcome: Progressing  Goal: Patient/Family Short Term Goal  Description: Patient's Short Term Goal:     Interventions:   -   - See additional Care Plan goals for specific interventions  Outcome: Progressing     Problem: MUSCULOSKELETAL - ADULT  Goal: Return mobility to safest level of function  Description: INTERVENTIONS:  - Assess patient stability and activity tolerance for standing, transferring and ambulating w/ or w/o assistive devices  - Assist with transfers and ambulation using safe patient handling equipment as  needed  - Ensure adequate protection for wounds/incisions during mobilization  - Obtain PT/OT consults as needed  - Advance activity as appropriate  - Communicate ordered activity level and limitations with patient/family  Outcome: Progressing     Problem: PAIN - ADULT  Goal: Verbalizes/displays adequate comfort level or patient's stated pain goal  Description: INTERVENTIONS:  - Encourage pt to monitor pain and request assistance  - Assess pain using appropriate pain scale  - Administer analgesics based on type and severity of pain and evaluate response  - Implement non-pharmacological measures as appropriate and evaluate response  - Consider cultural and social influences on pain and pain management  - Manage/alleviate anxiety  - Utilize distraction and/or relaxation techniques  - Monitor for opioid side effects  - Notify MD/LIP if interventions unsuccessful or patient reports new pain  - Anticipate increased pain with activity and pre-medicate as appropriate  Outcome: Progressing     Problem: RISK FOR INFECTION - ADULT  Goal: Absence of fever/infection during anticipated neutropenic period  Description: INTERVENTIONS  - Monitor WBC  - Administer growth factors as ordered  - Implement neutropenic guidelines  Outcome: Progressing     Problem: SAFETY ADULT - FALL  Goal: Free from fall injury  Description: INTERVENTIONS:  - Assess pt frequently for physical needs  - Identify cognitive and physical deficits and behaviors that affect risk of falls.  - [place] fall precautions as indicated by assessment.  - Educate pt/family on patient safety including physical limitations  - Instruct pt to call for assistance with activity based on assessment  - Modify environment to reduce risk of injury  - Provide assistive devices as appropriate  - Consider OT/PT consult to assist with strengthening/mobility  - Encourage toileting schedule  Outcome: Progressing     Problem: DISCHARGE PLANNING  Goal: Discharge to home or other  facility with appropriate resources  Description: INTERVENTIONS:  - Identify barriers to discharge w/pt and caregiver  - Include patient/family/discharge partner in discharge planning  - Arrange for needed discharge resources and transportation as appropriate  - Identify discharge learning needs (meds, wound care, etc)  - Arrange for interpreters to assist at discharge as needed  - Consider post-discharge preferences of patient/family/discharge partner  - Complete POLST form as appropriate  - Assess patient's ability to be responsible for managing their own health  - Refer to Case Management Department for coordinating discharge planning if the patient needs post-hospital services based on physician/LIP order or complex needs related to functional status, cognitive ability or social support system  Outcome: Progressing

## 2024-12-09 NOTE — OCCUPATIONAL THERAPY NOTE
OCCUPATIONAL THERAPY TREATMENT NOTE - INPATIENT        Room Number: 553/553-A     Presenting Problem: peripheral polyneuropathy    Problem List  Principal Problem:    Peripheral polyneuropathy  Active Problems:    ESRD on hemodialysis (HCC)      OCCUPATIONAL THERAPY ASSESSMENT   Patient demonstrates limited progress this session, goals remain in progress.    Patient is requiring stand-by assist, contact guard assist, minimal assist, moderate assist, and maximum assist as a result of the following impairments: decreased functional strength, decreased endurance, pain, impaired sitting/standing balance, impaired coordination, decreased muscular endurance, and medical status.    Patient continues to function below baseline with toileting, bathing, lower body dressing, bed mobility, transfers, static sitting balance, dynamic sitting balance, static standing balance, dynamic standing balance, and functional standing tolerance.  Next session anticipate patient to progress toileting, lower body dressing, bed mobility, transfers, static sitting balance, and dynamic sitting balance.  Occupational Therapy will continue to follow patient for duration of hospitalization.    Patient continues to benefit from continued skilled OT services: to promote return to prior level of function and safety with continuous assistance and gradual rehabilitative therapy.     PLAN DURING HOSPITALIZATION  OT Device Recommendations: TBD  OT Treatment Plan: Balance activities;Energy conservation/work simplification techniques;ADL training;Functional transfer training;Endurance training;Patient/Family education;Patient/Family training;Equipment eval/education;Compensatory technique education     SUBJECTIVE  I am always in pain.     OBJECTIVE  Precautions: Limb alert - right;Bed/chair alarm      PAIN ASSESSMENT  Ratin  Location: generalized body pains  Management Techniques: Activity promotion; Relaxation; Repositioning    ACTIVITY  TOLERANCE  Pulse: 70  Heart Rate Source: Monitor     BP: 135/46  BP Location: Left arm  BP Method: Automatic  Patient Position: Sitting    O2 SATURATIONS  Oxygen Therapy  SPO2% on Room Air at Rest: 95  SPO2% Ambulation on Room Air: 91    ACTIVITIES OF DAILY LIVING ASSESSMENT  AM-PAC ‘6-Clicks’ Inpatient Daily Activity Short Form  How much help from another person does the patient currently need…  -   Putting on and taking off regular lower body clothing?: A Lot  -   Bathing (including washing, rinsing, drying)?: A Lot  -   Toileting, which includes using toilet, bedpan or urinal? : A Lot  -   Putting on and taking off regular upper body clothing?: A Little  -   Taking care of personal grooming such as brushing teeth?: A Little  -   Eating meals?: None    AM-PAC Score:  Score: 16  Approx Degree of Impairment: 53.32%  Standardized Score (AM-PAC Scale): 35.96  CMS Modifier (G-Code): CK    FUNCTIONAL TRANSFER ASSESSMENT  Sit to Stand: Edge of Bed  Edge of Bed: Moderate Assist    FUNCTIONAL MOBILITY  Pt required min-mod assist to complete room distance functional mobility with RW for support a chair follow for safety. Pt presenting with flexed posture throughout mobility and slight R foot drop. Pt with no overt LOB during activity.     BED MOBILITY  Supine to Sit : Maximum Assist    BALANCE ASSESSMENT  Static Sitting: Stand-by Assist  Static Standing: Minimal Assist  Dynamic Standing: Moderate Assist     FUNCTIONAL ADL ASSESSMENT  Toileting Standing: Maximum Assist (to complete angelica cares with RW for support)  Pt demonstrated appropriate BUE strength, coordination, and ROM to complete grooming, eating, and UB dressing tasks with set-up assist in supported sitting.     Skilled Therapy Provided:   RN cleared pt for participation. Session coordinated with PT. Pt received laying in bed with daughter present. Pt agreeable to participation in therapy. Pt benefited from increased time, verbal cues, rest breaks, and RW for  support.     To assess pt's participation during tasks while also providing intermittent education to maximize participation, OT facilitated the following: bed mobility, functional transfer training, and functional mobility training. Pt completed all tasks with assist levels listed above. Pt noted to have small bowel movement upon initially standing, requiring total assist for angelica care while in standing. Pt demonstrated improved static sitting balance with no posterior lean this session requiring CGA for safety due to increased fatigue.       EDUCATION PROVIDED  Patient Education : Role of Occupational Therapy; Plan of Care; Functional Transfer Techniques; Fall Prevention; Posture/Positioning; Proper Body Mechanics; Energy Conservation  Patient's Response to Education: Verbalized Understanding; Returned Demonstration  Family/Caregiver Education : Role of Occupational Therapy; Plan of Care  Family/Caregiver's Response to Education: Verbalized Understanding    The patient's Approx Degree of Impairment: 53.32% has been calculated based on documentation in the [facility] '6 clicks' Inpatient Daily Activity Short Form.  Research supports that patients with this level of impairment may benefit from rehab.  Final disposition will be made by interdisciplinary medical team.    Patient End of Session: Up in chair;Needs met;Call light within reach;RN aware of session/findings;All patient questions and concerns addressed;Hospital anti-slip socks;Family present    OT Goals:  Patients self stated goal is: unstated     Patient will complete functional transfer with min A  Comment: ongoing- mod assist     Patient will complete toileting with min A  Comment: ongoing- max     Patient will tolerate standing for 3 minutes in prep for adls with min A   Comment: ongoing     Patient will complete item retrieval with min A  Comment: not tested           Goals  on: 24  Frequency: 3-5x/week  OT Session Time: 25 minutes  Self-Care  Home Management: 25 minutes

## 2024-12-09 NOTE — CM/SW NOTE
Pt discussed in RN DC Rounds. Sw was informed that patient and family wishes for [facility]. SW reached out to [facility] to see if they can accept. [facility] accepted. SW provided patient update, JONO at bedside. Son in Law aware. Patient wishes for [facility]. Family wishes to upgrade semi Electric hospital bed at home. [facility] Liaison information that MB Zulema arranged all DME through [employer]- elroy lift, semi electric hospital bed, and wheelchair. JONO was given number for reliable [facility]. SW updated wife and daughter. Both are aware of insurance auth is needed for [facility].     PASRR level 1 screen submitted and completed and uploaded to [facility] referral. [facility] approval items sent- Labs, Device Hep B profile.       Plan: Pending medical clearance, DC to [facility], PASRR completed, * HD flowsheet, *auth    SW/CM to remain available for support and/or discharge planning.     Annette Shannon, MSW, LSW   x [phone]

## 2024-12-09 NOTE — CONGREGATE LIVING REVIEW
[facility] Living Authorization    The [facility] Review Committee has reviewed this case and the patient IS APPROVED for discharge to a facility for Short Term Skilled once the following procedure is followed:     - The physician discharge instructions (contained within the IRENE note for SNF) must inlcude the below appropriate and approved COVID instructions to the facility    For questions regarding CLRC approval process, please contact the CM assigned to the case.  For questions regarding RN discharge workflow, please contact the unit Clinical Leader.

## 2024-12-09 NOTE — PHYSICAL THERAPY NOTE
PHYSICAL THERAPY TREATMENT NOTE - INPATIENT     Room Number: 553/553-A       Presenting Problem: peripheral neuropathy  Co-Morbidities : BLE leg pain    Problem List  Principal Problem:    Peripheral polyneuropathy  Active Problems:    ESRD on hemodialysis ([facility])      PHYSICAL THERAPY ASSESSMENT   Patient demonstrates good  progress this session, goals  remain in progress.      Patient is requiring moderate assist and maximum assist as a result of the following impairments: decreased functional strength, decreased endurance/aerobic capacity, pain, impaired standing balance, impaired coordination, decreased muscular endurance, and medical status.     Patient continues to function below baseline with bed mobility, transfers, gait, maintaining seated position, standing prolonged periods, and performing household tasks.  Next session anticipate patient to progress bed mobility, transfers, gait, and standing prolonged periods.  Physical Therapy will continue to follow patient for duration of hospitalization.    Patient continues to benefit from continued skilled PT services: to promote return to prior level of function and safety with continuous assistance and gradual rehabilitative therapy .    PLAN DURING HOSPITALIZATION  Nursing Mobility Recommendation : Lift Equipment  PT Device Recommendation: Rolling walker  PT Treatment Plan: Body mechanics;Bed mobility;Patient education;Gait training;Transfer training;Balance training  Frequency (Obs): 3-5x/week     SUBJECTIVE  Agreeable     OBJECTIVE  Precautions: Limb alert - right;Bed/chair alarm    PAIN ASSESSMENT   Ratin  Location: generalized body pains  Management Techniques: Body mechanics;Activity promotion;Repositioning    BALANCE  Static Sitting: Fair  Dynamic Sitting: Fair -  Static Standing: Poor +  Dynamic Standing: Poor    ACTIVITY TOLERANCE  Pulse: 70  Heart Rate Source: Monitor     BP: 135/46  BP Location: Left arm  BP Method: Automatic  Patient Position:  Sitting     O2 WALK  Oxygen Therapy  SPO2% on Room Air at Rest: 95  SPO2% Ambulation on Room Air: 91    AM-PAC '6-Clicks' INPATIENT SHORT FORM - BASIC MOBILITY  How much difficulty does the patient currently have...  Patient Difficulty: Turning over in bed (including adjusting bedclothes, sheets and blankets)?: A Lot   Patient Difficulty: Sitting down on and standing up from a chair with arms (e.g., wheelchair, bedside commode, etc.): A Lot   Patient Difficulty: Moving from lying on back to sitting on the side of the bed?: A Lot   How much help from another person does the patient currently need...   Help from Another: Moving to and from a bed to a chair (including a wheelchair)?: A Lot   Help from Another: Need to walk in hospital room?: A Lot   Help from Another: Climbing 3-5 steps with a railing?: Total     AM-PAC Score:  Raw Score: 11   Approx Degree of Impairment: 72.57%   Standardized Score (AM-PAC Scale): 33.86   CMS Modifier (G-Code): CL    FUNCTIONAL ABILITY STATUS  Functional Mobility/Gait Assessment  Gait Assistance: Moderate assistance (progressed to Minimum assistance for straight path)  Distance (ft): 3 ft and 25 ft  Assistive Device: Rolling walker  Pattern: Shuffle;R Foot drop;R Hip hike (decreased pushpa speed, decreased step length, flexed posture, unsteady - no overt LOB)  Supine to Sit: maximum assist. Patient tolerated static sitting approx 10 minutes with BUE support and CGA and Min A in order to maintain static sitting balance.  Sit to Stand: moderate assist with RW from EOB for two trials. Patient with initial posterior lean, requiring cues to correct.     Skilled Therapy Provided: Patient in bed upon arrival. RN approved activity. Educated patient on POC and benefits of mobilization. Agreeable to participate. Patient reporting generalized body pains, rated 5 out of 10 per the pain scale. Patient benefits from increased time, cues, and assistance in order to complete functional mobility  tasks. Patient noted to have small bowel movement upon initially standing, requiring total assist for angelica care.     The patient's Approx Degree of Impairment: 72.57% has been calculated based on documentation in the [facility] '6 clicks' Inpatient Daily Activity Short Form.  Research supports that patients with this level of impairment may benefit from rehab.  Final disposition will be made by interdisciplinary medical team.    THERAPEUTIC EXERCISES  Lower Extremity Ankle pumps  LAQ     Position Sitting       Patient End of Session: Up in chair;Needs met;Call light within reach;RN aware of session/findings;All patient questions and concerns addressed;Hospital anti-slip socks;Family present (alarm pad present in chair, however, alarm box in room not working - RN aware)    CURRENT GOALS   Goals to be met by: 12/20/24  Patient Goal Patient's self-stated goal is: to go home   Goal #1 Patient is able to demonstrate supine - sit EOB @ level: minimum assistance      Goal #1   Current Status  Max A   Goal #2 Patient is able to demonstrate transfers Sit to/from Stand at assistance level: minimum assistance with walker - rolling      Goal #2  Current Status  Mod A with RW   Goal #3 Patient is able to ambulate 10 feet with assist device: walker - rolling at assistance level: minimum assistance   Goal #3   Current Status  Mod/Min A 3 ft and 25 ft with RW   Goal #4 Patient to demonstrate independence with home activity/exercise instructions provided to patient in preparation for discharge.   Goal #4   Current Status  Ongoing     Gait Training: 10 minutes  Therapeutic Activity: 15 minutes

## 2024-12-09 NOTE — PLAN OF CARE
Problem: Patient Centered Care  Goal: Patient preferences are identified and integrated in the patient's plan of care  Description: Interventions:  - What would you like us to know as we care for you? I am from home with my wife  - Provide timely, complete, and accurate information to patient/family  - Incorporate patient and family knowledge, values, beliefs, and cultural backgrounds into the planning and delivery of care  - Encourage patient/family to participate in care and decision-making at the level they choose  - Honor patient and family perspectives and choices  Outcome: Progressing     Problem: Diabetes/Glucose Control  Goal: Glucose maintained within prescribed range  Description: INTERVENTIONS:  - Monitor Blood Glucose as ordered  - Assess for signs and symptoms of hyperglycemia and hypoglycemia  - Administer ordered medications to maintain glucose within target range  - Assess barriers to adequate nutritional intake and initiate nutrition consult as needed  - Instruct patient on self management of diabetes  Outcome: Progressing     Problem: Patient/Family Goals  Goal: Patient/Family Long Term Goal  Description: Patient's Long Term Goal: Discharge    Interventions:  - Monitor vitals  - Monitor appropriate labs  - Administer medications as ordered  - Follow MD's orders  - Update patient on plan of care   - Discharge planning     - See additional Care Plan goals for specific interventions  Outcome: Progressing  Goal: Patient/Family Short Term Goal  Description: Patient's Short Term Goal:     Interventions:   -   - See additional Care Plan goals for specific interventions  Outcome: Progressing     Problem: MUSCULOSKELETAL - ADULT  Goal: Return mobility to safest level of function  Description: INTERVENTIONS:  - Assess patient stability and activity tolerance for standing, transferring and ambulating w/ or w/o assistive devices  - Assist with transfers and ambulation using safe patient handling equipment as  needed  - Ensure adequate protection for wounds/incisions during mobilization  - Obtain PT/OT consults as needed  - Advance activity as appropriate  - Communicate ordered activity level and limitations with patient/family  Outcome: Progressing     Problem: PAIN - ADULT  Goal: Verbalizes/displays adequate comfort level or patient's stated pain goal  Description: INTERVENTIONS:  - Encourage pt to monitor pain and request assistance  - Assess pain using appropriate pain scale  - Administer analgesics based on type and severity of pain and evaluate response  - Implement non-pharmacological measures as appropriate and evaluate response  - Consider cultural and social influences on pain and pain management  - Manage/alleviate anxiety  - Utilize distraction and/or relaxation techniques  - Monitor for opioid side effects  - Notify MD/LIP if interventions unsuccessful or patient reports new pain  - Anticipate increased pain with activity and pre-medicate as appropriate  Outcome: Progressing     Problem: RISK FOR INFECTION - ADULT  Goal: Absence of fever/infection during anticipated neutropenic period  Description: INTERVENTIONS  - Monitor WBC  - Administer growth factors as ordered  - Implement neutropenic guidelines  Outcome: Progressing     Problem: SAFETY ADULT - FALL  Goal: Free from fall injury  Description: INTERVENTIONS:  - Assess pt frequently for physical needs  - Identify cognitive and physical deficits and behaviors that affect risk of falls.  - [place] fall precautions as indicated by assessment.  - Educate pt/family on patient safety including physical limitations  - Instruct pt to call for assistance with activity based on assessment  - Modify environment to reduce risk of injury  - Provide assistive devices as appropriate  - Consider OT/PT consult to assist with strengthening/mobility  - Encourage toileting schedule  Outcome: Progressing     Problem: DISCHARGE PLANNING  Goal: Discharge to home or other  facility with appropriate resources  Description: INTERVENTIONS:  - Identify barriers to discharge w/pt and caregiver  - Include patient/family/discharge partner in discharge planning  - Arrange for needed discharge resources and transportation as appropriate  - Identify discharge learning needs (meds, wound care, etc)  - Arrange for interpreters to assist at discharge as needed  - Consider post-discharge preferences of patient/family/discharge partner  - Complete POLST form as appropriate  - Assess patient's ability to be responsible for managing their own health  - Refer to Case Management Department for coordinating discharge planning if the patient needs post-hospital services based on physician/LIP order or complex needs related to functional status, cognitive ability or social support system  Outcome: Progressing

## 2024-12-09 NOTE — PROGRESS NOTES
[facility]  part of [facility]    Progress Note    Ollie Hernández Patient Status:  Inpatient    1945 MRN [id number]   Location [facility] 5SW/SE Attending Dee Joyce,*   Hosp Day # 3 PCP Wili Parmar MD     Chief Complaint: foot pain    Subjective:     Constitutional:  Negative for appetite change.   HENT:  Negative for dental problem.    Respiratory:  Negative for cough and choking.    Musculoskeletal:  Positive for joint swelling and joint pain.   Neurological:  Negative for light-headedness.   Psychiatric/Behavioral:  Negative for decreased concentration.        Objective:   Blood pressure 135/46, pulse 70, temperature 98.9 °F (37.2 °C), temperature source Axillary, resp. rate 18, height 5' 4\" (1.626 m), weight 147 lb 14.4 oz (67.1 kg), SpO2 98%.  Physical Exam  Vitals and nursing note reviewed.   Constitutional:       General: He is not in acute distress.     Appearance: He is ill-appearing. He is not toxic-appearing or diaphoretic.   Cardiovascular:      Rate and Rhythm: Normal rate.      Pulses: Normal pulses.   Pulmonary:      Breath sounds: Rhonchi present. No wheezing.   Abdominal:      General: Bowel sounds are normal.      Palpations: Abdomen is soft.   Skin:     General: Skin is warm and dry.      Capillary Refill: Capillary refill takes less than 2 seconds.   Neurological:      General: No focal deficit present.      Mental Status: He is alert.   Psychiatric:         Behavior: Behavior normal.         Results:   Lab Results   Component Value Date    WBC 10.4 2024    HGB 7.1 (L) 2024    HCT 22.4 (L) 2024    .0 2024    CREATSERUM 3.09 (H) 2024    BUN 27 (H) 2024     2024    K 3.4 (L) 2024    CL 99 2024    CO2 36.0 (H) 2024     (H) 2024    CA 9.3 2024    ALB 3.0 (L) 2024    ALKPHO 116 2024    BILT 0.3 2024    TP 5.7 2024    AST 32  12/07/2024    ALT 29 12/07/2024    PTT 30.1 08/09/2024    INR 1.45 (H) 08/09/2024    T4F 0.9 08/31/2022    TSH 2.844 07/04/2024     (H) 07/30/2024    PSA 2.94 10/20/2021    DDIMER 6.07 (H) 09/29/2024    ESRML 10 06/16/2024    CRP 1.30 (H) 06/16/2024    MG 2.3 12/09/2024    PHOS 1.6 (L) 12/09/2024    TROP <0.045 07/25/2019    TROPHS 52 12/03/2024     08/05/2023    B12 672 07/04/2024       No results found.        Assessment & Plan:     Peripheral polyneuropathy; checked rpr ok; thyroid; electropheresis; b12 ok 7/24  -Patient p/w bilateral lower extremity pain  -Significantly worse over the past 3 to 4 days.  -No real improvement with gabapentin at home  -Difficulties with ambulation because of pain  -Pain control as able; pt screaming in pain or asleep; pain clinic saw  -PT/OT  -Continue to monitor     ESRD on HD  -Normally receives dialysis on Monday Wednesday Friday.  Last session was on Wednesday.  -Nephrology consulted, further HD per nephrology.     Anemia of chronic renal disease and iron defy check stool for blood  -Likely secondary to ESRD as above  -Hemoglobin noted to be 7.1  -Continue to monitor     Type 2 DM   - Monitoring Blood glucose with POC checks  - SSI to cover hyperglycemia  - Hypoglycemia protocol  - Will monitor and adjust agents as needed.      -KRAIG  -Hypertension  -Chronic dCHF  -Pulm HTN    Afib has watchman dr romano put in in 9/2024; his assc dr coffey will see but likely compatible with nri    Severe ls disk disease in 2023; per pain recheck mri       Patient will require inpatient services that will reasonably be expected to span two midnight's based on the clinical documentation in H+P.   Based on patients current state of illness, I anticipate that, after discharge, patient will require TBD.  Complex mdm; coordinating care with nurse/pain clinic/mis buckner and counseling pt and with his permission his son in law in room about labs/neuropathy/mri    BRITTANY SÁNCHEZ  MD [facility]

## 2024-12-09 NOTE — DIETARY NOTE
ADULT NUTRITION REASSESSMENT    Pt is at high nutrition risk.  Pt does not meet malnutrition criteria.      RECOMMENDATIONS TO MD: See Nutrition Intervention for enteral nutrition specifics     ADMITTING DIAGNOSIS:  ESRD on hemodialysis (HCC) [N18.6, Z99.2]  Peripheral polyneuropathy [G62.9]  PERTINENT PAST MEDICAL HISTORY:   Past Medical History:    Anemia    Asthma (HCC)    Back problem    BPH (benign prostatic hyperplasia)    Calculus of kidney    Cataract    Coronary atherosclerosis    Diabetes (HCC)    ESRD (end stage renal disease) on dialysis (HCC)    Essential hypertension    High blood pressure    High cholesterol    History of blood transfusion    Hyperlipidemia    Neuropathy    hands and feet    KRAIG on CPAP    Renal disorder    Sleep apnea    Visual impairment    glasses    Vocal cord paralysis, unilateral partial     PATIENT STATUS: Initial 12/07/24: Pt admit for ESRD on HD and peripheral polyneuropathy. PMH sig for ESRD on HD (MWF), Diabetes (A1c 6.8% on 12/3/24), G-tube (placed 10/19/24) and others noted above. Pt assessed due to screening at risk for pressure injury (PI) and chronic g-tube. RD also received consult to order and manage tube feedings. Pt known to nutrition services from previous admissions, last seen 10/21/24. Chart reviewed, pt admitted with c/o bilateral foot pain - recently discharged from rehab for hip fracture. PEG tube placed recently s/p aspiration pneumonia and inability to swallow. Discussion with RN, no concerns. Pt visited, spouse at bedside assisting in home TF regimen and weight history. Spouse reports using bolus feedings as continuous feedings over 18 hours was too difficult due to in and out of various doctor appointments therefore was told to switch to bolus feedings - timeframe unknown. Spouse reports doing 1 carton 3x/day (3 cartons total) - spouse reports trying for 3.5 cartons daily but consistently 3 cartons daily. Spouse reports usual body weight (UBW) prior to  getting sick 155-157# - last known around September 2024. Current weight 148# 9.6 oz. EMR weight review, last known weight # on 11/30/24. Per EMR weight review, pt noted weighing 143# 12 oz on 10/2/24 - stable, 155# 11 oz on 9/28/24 - 7.1# or 4.6% weight loss x 3 months (non-significant),  157# 11 oz on 6/1/24 - 9.1# or 5.8% weight loss x 6 months (non-significant) and 166# on 11/30/23 - 17.4# or 10.5% weight loss x 13 months (non-significant). Nutrition focused physical exam (NFPE) completed, see below for details. See nutrition intervention for TF recommendations, start with continuous feedings and transition to bolus feedings prior to discharge.     12/08/24 UPDATE: Pt discussed with RN via phone. Adjusted EN feeds to home regimen, bolus feeds, now that pt is tolerating feeds at goal rate. RN to hold continuous feeds now and start with first bolus at 1600 hrs.      12/9/2024 Update:  Re-evaluated d/t formula unavailability . Tolerating bolus feeding. + BM. Dialysis today.   Labs this am were pre-dialysis values with notable hypokalemia and hypophosphatemia. Per discussion with MD, allowing to use Standard formula for few days and should resume to Nepro for when discharge.    Resumed EN to continuous and used Jevity 1.5 fiber containing formula. Discussed with RN holding an hour before and after lansoprazole administration.         FOOD/NUTRITION RELATED HISTORY:  Intake:  Per spouse: Nepro bolus feedings: 1 carton 3x/day (total of 3 cartons total). Provides 1260 calories, 57 grams protein and 516 ml free water.  FWF 1 syringe before and after each feeding (unsure exact amount of syringe)  Spouse reports trying to get 3.5 cartons in daily.   Intake Meeting Needs: TF will meet needs once at goal rate     Food Allergies: No Known Food Allergies (NKFA)  Cultural/Ethnic/Restorationism Preferences: Not Obtained    GASTROINTESTINAL: +BM 12/9/24 - Soft brown smear stool x 1 and PEG/G-tube    MEDICATIONS: reviewed IVF  noted  Lansoprazole q morning (Hold TF 1 hour before and after administration per protocol)   lidocaine-menthol  2 patch Transdermal Daily    sodium ferric gluconate  125 mg Intravenous Daily    epoetin donna  10,000 Units Subcutaneous Once per day on Monday Wednesday Friday    gabapentin  100 mg Per G Tube BID    nortriptyline  10 mg Oral Nightly    amiodarone  200 mg Per G Tube Daily    aspirin  81 mg Oral Daily    atorvastatin  40 mg Per G Tube Nightly    carvedilol  25 mg Per G Tube BID    clopidogrel  75 mg Oral Daily    fluticasone propionate  2 spray Nasal Daily    insulin degludec  5 Units Subcutaneous Nightly    lansoprazole  30 mg Per G Tube QAM AC    heparin  5,000 Units Subcutaneous Q8H STEPHANIE    insulin aspart  1-7 Units Subcutaneous TID CC and HS      dextrose 10%       LABS: reviewed A1c 6.8% on 12/3/24  Low K and Phos.   Not using lyte protocol as pt on dialysis. EN formula adjusted instead.   Recent Labs     12/07/24  0547 12/08/24  0549 12/09/24  0546   * 184* 114*   BUN 37* 54* 27*   CREATSERUM 4.43* 5.29* 3.09*   CA 9.6 9.2 9.3   MG 2.4 2.4 2.3   * 133* 137   K 3.9 3.8 3.4*   CL 95* 94* 99   CO2 32.0 32.0 36.0*   PHOS  --  2.1* 1.6*   OSMOCALC 286 296* 290     NUTRITION RELATED PHYSICAL FINDINGS:  - Nutrition Focused Physical Exam (NFPE): mild depletion body fat triceps region  - Fluid Accumulation: none  see RN documentation for details  - Skin Integrity: at risk and Stage 2 posterior scrotum and DTI left heel noted per flowsheet documentation  --> see RN documentation for details    ANTHROPOMETRICS:  HT: 162.6 cm (5' 4\")  WT: 67.1 kg (147 lb 14.4 oz)   BMI: Body mass index is 25.39 kg/m².  BMI CLASSIFICATION: 25-29.9 kg/m2 - overweight  IBW: 130 lbs        114% IBW  Usual Body Wt: 155-157 lbs per spouse      95-96% UBW    WEIGHT HISTORY:  Patient Weight(s) for the past 336 hrs:   Weight   12/09/24 0423 67.1 kg (147 lb 14.4 oz)   12/08/24 0650 67.2 kg (148 lb 1.6 oz)   12/06/24 1800  67.4 kg (148 lb 9.6 oz)   12/06/24 1702 67.4 kg (148 lb 9.6 oz)     Wt Readings from Last 10 Encounters:   12/09/24 67.1 kg (147 lb 14.4 oz)   11/30/24 65.8 kg (145 lb)   10/22/24 60.3 kg (132 lb 14.4 oz)   08/22/24 72.5 kg (159 lb 12.8 oz)   08/20/24 71.7 kg (158 lb)   08/13/24 65.6 kg (144 lb 11.2 oz)   08/06/24 70.8 kg (156 lb)   08/01/24 66.5 kg (146 lb 8 oz)   06/27/24 73.9 kg (163 lb)   06/13/24 71 kg (156 lb 8 oz)     NUTRITION DIAGNOSIS/PROBLEM:   Inadequate enteral nutrition infusion related to Infusion volume not reached or schedule for infusion interrupted as evidenced by TF currently not running   Nutrition Diagnosis Progress: Improvement (unresolved) EN at goal rate      Increased nutrient needs related to increased demand of nutrient as evidenced by pressure injuries per flowsheet   Nutrition Diagnosis Progress:    Remains of concern      NUTRITION INTERVENTION:   NUTRITION PRESCRIPTION:   Estimated Nutrition needs: --dosing wt of 67.4 kg - wt taken on 12/6/24  Calories: 1860-2030 calories/day (MSJ 1314 kcal x 1.3 AF x 1.0 IF or 25-30  calories per kg Dosing wt)   Protein: 67-81 g protein/day (1.0-1.2 g protein/kg Dosing wt)  Fluid Needs: 1348 ml (20ml/kg dosing wt). Adjust per clinical status (ESRD)    - Diet:       Procedures    NPO     - Enteral Nutrition ( EN) : RD adjusted to the following,  EN Jevity 1.5 @  goal rate of 60 ml/hr via G-tube on average 21  hr infusion time.         Water flushes of 50 ml q 4 hr (300 ml).  (Prescription  provides 1890 kcal, 80 g pro & 958 ml free water.   Total free water of 1258 ml/day.   Met 100% of kcal & 100 % of Protein needs and or >100 % RDIs)  Above orders discussed with RN.    Should resume Back to NEPRO before discharge and or when labs are acceptable.     **Home Bolus Feeding Recommendation: Nepro 4.5 cartons daily (1 @ 0800, 1.5 @ 1200, 1 @ 1600, 1 @ 2000). FWF 50 ml before and after each feeding. Provides: 1890 calories, 86 grams protein, 774 ml free  water. Total water: 1174 ml total water.    - RD Malnutrition Care Plan:  Long term use of Nutrition support therapy ( NST)  - Vitamin and mineral supplements: none  - Nutrition education: assess education needs   - Coordination of nutrition care: collaboration with other providers  - Discharge and transfer of nutrition care to new setting or provider: monitor plans    MONITOR AND EVALUATE/NUTRITION GOALS:  - Food and Nutrient Administration:      Monitor: tolerance to enteral nutrition, adequacy of enteral nutrition, resumption of enteral nutrition, and for enteral nutrition adjustment  - Anthropometric Measurement:    Monitor weight  - Nutrition Goals:      halt wt loss, tube feed meets greater than 80% of needs, labs within acceptable limits, minimize lean body mass loss, support wound healing, and improved GI status      DIETITIAN FOLLOW UP: RD to follow and monitor nutrition status      Sabine Acosta RD, LDN, [facility]  Clinical Dietitian  [phone]

## 2024-12-09 NOTE — CHRONIC PAIN
[facility]  Inpatient Progress Note     Ollie Hernández Patient Status:  Inpatient    1945 MRN [id number]   Location [facility] 5SW/SE Attending Dee Joyce,*   Hosp Day # 2 PCP Wili Parmar MD     Date of Admission:  2024  Date of Consult:  2024    Reason for Consultation:  Neuropathy     History of Present Illness:  Ollie Hernández is a a(n) 79 year old male with multiple medical co-morbidities most notable for SKD on HD, DM, CAD, Anemia, KRAIG, Pulm HTN and CHF admitted for worsening bilat feet burning pain.      Subjective: Patient reports worsening burning pain in the feet. Upon additional questioning, the patient endorsed experiencing bilateral shooting pain down both legs which has been present for long time. Today, he rates his pain as a 5/10.   He reports remote history of lumbar spine injections (possibly epidural) but unable to provide details specific to the time, provider or type of the injection. States that injections might have helped. States that he would be open to repeating back injection.   Denies issues with the pain medications.   Difficulty extracting from the patient answers about urinary and/or bowel incontinence. When asked about it the patient responded \"I'm urinating all the time; sometimes I know that I need to go sometimes I dont\"       History:  Past Medical History:    Anemia    Asthma (HCC)    Back problem    BPH (benign prostatic hyperplasia)    Calculus of kidney    Cataract    Coronary atherosclerosis    Diabetes (HCC)    ESRD (end stage renal disease) on dialysis (HCC)    Essential hypertension    High blood pressure    High cholesterol    History of blood transfusion    Hyperlipidemia    Neuropathy    hands and feet    KRAIG on CPAP    Renal disorder    Sleep apnea    Visual impairment    glasses    Vocal cord paralysis, unilateral partial     Past Surgical History:   Procedure Laterality Date    Appendectomy           Back surgery      Neck/back - 1998    Capsule endoscopy - internal referral      Cataract extraction Right 01/08/2022    PHACOEMULSIFICATION WITH POSTERIOR CHAMBER INTRAOCULAR LENS, RIGHT EYE    Cataract extraction Left 12/21/2021    PHACOEMULSIFICATION WITH POSTERIOR CHAMBER INTRAOCULAR LENS, LEFT EYE    Cath bare metal stent (bms)      Cath drug eluting stent  05/21/2024    Successful IVUS guided PCI of the circumflex with a ABIMBOLA    Colonoscopy      Colonoscopy N/A 01/25/2021    Procedure: COLONOSCOPY;  Surgeon: Michael Gautam MD;  Location: [facility] ENDO    Colonoscopy N/A 06/03/2024    Procedure: COLONOSCOPY;  Surgeon: Gabriel Saldana MD;  Location: [facility] ENDOSCOPY    Colonoscopy N/A 06/15/2024    Procedure: COLONOSCOPY;  Surgeon: Carlyn Storey MD;  Location: [facility] ENDOSCOPY    Colonoscopy N/A 07/31/2024    Procedure: COLONOSCOPY;  Surgeon: Gabriel Saldana MD;  Location: [facility] ENDOSCOPY    Dialysis procedure  02/17/2023    right internal jugular tunneled dialysis catheter placement.    Hand/finger surgery unlisted      Accidental trauma    Spine surgery procedure unlisted      Total hip arthroplasty Left 10/04/2024    Left anterior total hip arthroplasty    Upper gi endoscopy,diagnosis       Family History   Problem Relation Age of Onset    Cancer Father         Kidney    Breast Cancer Mother     Diabetes Mother     Diabetes Maternal Grandmother     Diabetes Maternal Grandfather     Heart Disorder Other         Uncle      reports that he quit smoking about 43 years ago. His smoking use included cigarettes. He started smoking about 60 years ago. He has a 17 pack-year smoking history. He has never used smokeless tobacco. He reports that he does not drink alcohol and does not use drugs.    Allergies:  Allergies[1]    Medications:    Current Facility-Administered Medications:     sodium ferric gluconate (Ferrlecit) 125 mg in sodium chloride 0.9% 100mL IVPB premix, 125 mg, Intravenous, Daily    heparin (Porcine) 1000  UNIT/ML injection 1,500 Units, 1.5 mL, Intravenous, PRN Dialysis    lidocaine-prilocaine (Emla) 2.5-2.5 % cream, , Topical, PRN    sodium chloride 0.9 % IV bolus 100 mL, 100 mL, Intravenous, Q30 Min PRN **AND** albumin human (Albumin) 25% injection 25 g, 25 g, Intravenous, PRN Dialysis    dextrose 10% infusion (TPN no rate), , Intravenous, Continuous PRN    pancrelipase (Lip-Prot-Amyl) (Zenpep) DR particles cap 10,000 Units, 10,000 Units, Per G Tube, PRN **AND** sodium bicarbonate tab 325 mg, 325 mg, Oral, PRN    lidocaine-menthol 4-1 % patch 1 patch, 1 patch, Transdermal, Daily    metoprolol (Lopressor) 5 mg/5mL injection 5 mg, 5 mg, Intravenous, PRN **OR** metoprolol (Lopressor) 5 mg/5mL injection 2.5 mg, 2.5 mg, Intravenous, PRN    heparin (Porcine) 1000 UNIT/ML injection 1,500 Units, 1.5 mL, Intravenous, PRN Dialysis    lidocaine-prilocaine (Emla) 2.5-2.5 % cream, , Topical, PRN    sodium chloride 0.9 % IV bolus 100 mL, 100 mL, Intravenous, Q30 Min PRN **AND** albumin human (Albumin) 25% injection 25 g, 25 g, Intravenous, PRN Dialysis    [START ON 12/9/2024] epoetin donna (Epogen, Procrit) 45768 UNIT/ML injection 10,000 Units, 10,000 Units, Subcutaneous, Once per day on Monday Wednesday Friday    gabapentin (Neurontin) 250 MG/5ML oral solution 100 mg, 100 mg, Per G Tube, BID    nortriptyline (Pamelor) cap 10 mg, 10 mg, Oral, Nightly    HYDROmorphone (Dilaudid) 1 MG/ML injection 0.1 mg, 0.1 mg, Intravenous, Q2H PRN **OR** HYDROmorphone (Dilaudid) 1 MG/ML injection 0.2 mg, 0.2 mg, Intravenous, Q2H PRN **OR** HYDROmorphone (Dilaudid) 1 MG/ML injection 0.4 mg, 0.4 mg, Intravenous, Q2H PRN    amiodarone (Pacerone) tab 200 mg, 200 mg, Per G Tube, Daily    aspirin DR tab 81 mg, 81 mg, Oral, Daily    atorvastatin (Lipitor) tab 40 mg, 40 mg, Per G Tube, Nightly    carvedilol (Coreg) tab 25 mg, 25 mg, Per G Tube, BID    clopidogrel (Plavix) tab 75 mg, 75 mg, Oral, Daily    fluticasone propionate (Flonase) 50 MCG/ACT nasal  suspension 2 spray, 2 spray, Nasal, Daily    insulin degludec (Tresiba) 100 units/mL flextouch 5 Units, 5 Units, Subcutaneous, Nightly    lansoprazole (Prevacid Solutab) disintegrating tab 30 mg, 30 mg, Per G Tube, QAM AC    glucose (Dex4) 15 GM/59ML oral liquid 15 g, 15 g, Oral, Q15 Min PRN **OR** glucose (Glutose) 40% oral gel 15 g, 15 g, Oral, Q15 Min PRN **OR** glucose-vitamin C (Dex-4) chewable tab 4 tablet, 4 tablet, Oral, Q15 Min PRN **OR** dextrose 50% injection 50 mL, 50 mL, Intravenous, Q15 Min PRN **OR** glucose (Dex4) 15 GM/59ML oral liquid 30 g, 30 g, Oral, Q15 Min PRN **OR** glucose (Glutose) 40% oral gel 30 g, 30 g, Oral, Q15 Min PRN **OR** glucose-vitamin C (Dex-4) chewable tab 8 tablet, 8 tablet, Oral, Q15 Min PRN    heparin (Porcine) 5000 UNIT/ML injection 5,000 Units, 5,000 Units, Subcutaneous, Q8H STEPHANIE    acetaminophen (Tylenol Extra Strength) tab 500 mg, 500 mg, Oral, Q4H PRN    acetaminophen (Tylenol) tab 650 mg, 650 mg, Oral, Q4H PRN **OR** HYDROcodone-acetaminophen (Norco) 5-325 MG per tab 1 tablet, 1 tablet, Oral, Q4H PRN **OR** [DISCONTINUED] HYDROcodone-acetaminophen (Norco) 5-325 MG per tab 2 tablet, 2 tablet, Oral, Q4H PRN    polyethylene glycol (PEG 3350) (Miralax) 17 g oral packet 17 g, 17 g, Oral, Daily PRN    sennosides (Senokot) tab 17.2 mg, 17.2 mg, Oral, Nightly PRN    bisacodyl (Dulcolax) 10 MG rectal suppository 10 mg, 10 mg, Rectal, Daily PRN    insulin aspart (NovoLOG) 100 Units/mL FlexPen 1-7 Units, 1-7 Units, Subcutaneous, TID CC and HS    Review of Systems:  Pertinent items are noted in HPI.    Physical Exam:  Blood pressure 108/49, pulse 72, temperature 99.5 °F (37.5 °C), temperature source Oral, resp. rate 18, height 5' 4\" (1.626 m), weight 148 lb 1.6 oz (67.2 kg), SpO2 95%.    No acute distress   Alert and oriented x3  Resp: non labored breathing   SLR: positive at 20 degrees b/l  Pain in the legs with movement such as placing pillow under his calves   MSK: moving all  extremities       Laboratory Data:  Lab Results   Component Value Date    WBC 11.1 12/08/2024    HGB 7.0 12/08/2024    HCT 21.2 12/08/2024    .0 12/08/2024    CREATSERUM 5.29 12/08/2024    BUN 54 12/08/2024     12/08/2024    K 3.8 12/08/2024    CL 94 12/08/2024    CO2 32.0 12/08/2024     12/08/2024    CA 9.2 12/08/2024    ALB 3.0 12/08/2024    MG 2.4 12/08/2024    PHOS 2.1 12/08/2024       Impression:  Patient Active Problem List   Diagnosis    Mixed hyperlipidemia    Pulmonary HTN ([facility])    KRAIG (obstructive sleep apnea)    Gout    Type 2 diabetes mellitus with chronic kidney disease on chronic dialysis, with long-term current use of insulin ([facility])    Anemia of chronic renal failure    Chronic diastolic congestive heart failure ([facility])    Vitamin D deficiency    Chronic obstructive asthma ([facility])    Secondary hyperparathyroidism ([facility])    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 ([facility])    Atherosclerosis of native arteries of extremities with intermittent claudication, bilateral legs ([facility])    Primary hypertension    Smokers' cough ([facility])    Unstable angina (HCC)    Lower GI bleed    ESRD (end stage renal disease) on dialysis ([facility])    PAF (paroxysmal atrial fibrillation) ([facility])    AVM (arteriovenous malformation) of colon    ABLA (acute blood loss anemia)    Rectal bleeding    Anticoagulated    Antiplatelet or antithrombotic long-term use    Lower GI bleeding    ESRD on hemodialysis ([facility])    GI bleed    Closed displaced midcervical fracture of left femur with delayed healing    ESRD (end stage renal disease) ([facility])    Closed fracture of left hip ([facility])    Hyperphosphatemia    Respiratory failure (HCC)    Anemia    Palliative care by specialist    Thrombocytopenia ([facility])    Peripheral polyneuropathy     Worsening bilateral burning pain c/w peripheral neuropathy- likely diabetic neuropathy but this is not the main or only reason      Given radicular BLE symptoms worsening pain related to spinal origin especially given the patient had on MRI lumbar spine from 02/07/23 advanced degenerative lumbar spine disease with severe canal stenosis at L2-3 and critical canal stenosis at L4-5     Recommendations:  Patient receptive to epidural steroid injection; defer until after MRI    Recommend obtaining updated MRI lumbar spine to assess the degree of spinal canal narrowing and see if spinal cord involved     Decide injection vs neurosurgical consult based on the results     Patient on clopidogrel     Continue norco 5/325 Q4hrs prn   Disc dilaudid IV   Renal dosing of gabapentin 100 BID; administer after dialysis on M,W,F  Nortriptyline 10mg nightly       Pain service will continue to follow, please call with questions       Thank you for allowing me to participate in the care of your patient.    Time spent: 32 minutes         Romina Herrera, DO  Anesthesiology  Pain Medicine             [1]   Allergies  Allergen Reactions    Adhesive Tape OTHER (SEE COMMENTS)     Severe rashes    Dust Mite Extract RASH

## 2024-12-09 NOTE — CHRONIC PAIN
[facility]  Report of Consultation    Ollie Hernández Patient Status:  Inpatient    1945 MRN [id number]   Location [facility] 5SW/SE Attending Dee Joyce,*   Hosp Day # 3 PCP Wili Parmar MD     Date of Admission:  2024  Date of Consult:  2024    Reason for Consultation:  Neuropathy   lumbar ddd ss    History of Present Illness:  Ollie Hernández is a a(n) 79 year old male with multiple medical co-morbidities most notable for SKD on HD, DM, CAD, Anemia, KRAIG, Pulm HTN and CHF admitted for worsening bilat feet burning pain.  Drowsy after norco   Neurontin renal dosed     History:  Past Medical History:    Anemia    Asthma (HCC)    Back problem    BPH (benign prostatic hyperplasia)    Calculus of kidney    Cataract    Coronary atherosclerosis    Diabetes (HCC)    ESRD (end stage renal disease) on dialysis (HCC)    Essential hypertension    High blood pressure    High cholesterol    History of blood transfusion    Hyperlipidemia    Neuropathy    hands and feet    KRAIG on CPAP    Renal disorder    Sleep apnea    Visual impairment    glasses    Vocal cord paralysis, unilateral partial     Past Surgical History:   Procedure Laterality Date    Appendectomy          Back surgery      Neck/back -     Capsule endoscopy - internal referral      Cataract extraction Right 2022    PHACOEMULSIFICATION WITH POSTERIOR CHAMBER INTRAOCULAR LENS, RIGHT EYE    Cataract extraction Left 2021    PHACOEMULSIFICATION WITH POSTERIOR CHAMBER INTRAOCULAR LENS, LEFT EYE    Cath bare metal stent (bms)      Cath drug eluting stent  2024    Successful IVUS guided PCI of the circumflex with a ABIMBOLA    Colonoscopy      Colonoscopy N/A 2021    Procedure: COLONOSCOPY;  Surgeon: Michael Gautam MD;  Location: [facility] ENDO    Colonoscopy N/A 2024    Procedure: COLONOSCOPY;  Surgeon: Gabriel Saldana MD;  Location: [facility] ENDOSCOPY    Colonoscopy N/A 06/15/2024     Procedure: COLONOSCOPY;  Surgeon: Carlyn Storey MD;  Location: [facility] ENDOSCOPY    Colonoscopy N/A 07/31/2024    Procedure: COLONOSCOPY;  Surgeon: Gabriel Saldana MD;  Location: [facility] ENDOSCOPY    Dialysis procedure  02/17/2023    right internal jugular tunneled dialysis catheter placement.    Hand/finger surgery unlisted      Accidental trauma    Spine surgery procedure unlisted      Total hip arthroplasty Left 10/04/2024    Left anterior total hip arthroplasty    Upper gi endoscopy,diagnosis       Family History   Problem Relation Age of Onset    Cancer Father         Kidney    Breast Cancer Mother     Diabetes Mother     Diabetes Maternal Grandmother     Diabetes Maternal Grandfather     Heart Disorder Other         Uncle      reports that he quit smoking about 43 years ago. His smoking use included cigarettes. He started smoking about 60 years ago. He has a 17 pack-year smoking history. He has never used smokeless tobacco. He reports that he does not drink alcohol and does not use drugs.    Allergies:  Allergies[1]    Medications:    Current Facility-Administered Medications:     sodium ferric gluconate (Ferrlecit) 125 mg in sodium chloride 0.9% 100mL IVPB premix, 125 mg, Intravenous, Daily    heparin (Porcine) 1000 UNIT/ML injection 1,500 Units, 1.5 mL, Intravenous, PRN Dialysis    lidocaine-prilocaine (Emla) 2.5-2.5 % cream, , Topical, PRN    sodium chloride 0.9 % IV bolus 100 mL, 100 mL, Intravenous, Q30 Min PRN **AND** albumin human (Albumin) 25% injection 25 g, 25 g, Intravenous, PRN Dialysis    dextrose 10% infusion (TPN no rate), , Intravenous, Continuous PRN    pancrelipase (Lip-Prot-Amyl) (Zenpep) DR particles cap 10,000 Units, 10,000 Units, Per G Tube, PRN **AND** sodium bicarbonate tab 325 mg, 325 mg, Oral, PRN    lidocaine-menthol 4-1 % patch 1 patch, 1 patch, Transdermal, Daily    metoprolol (Lopressor) 5 mg/5mL injection 5 mg, 5 mg, Intravenous, PRN **OR** metoprolol (Lopressor) 5 mg/5mL injection  2.5 mg, 2.5 mg, Intravenous, PRN    heparin (Porcine) 1000 UNIT/ML injection 1,500 Units, 1.5 mL, Intravenous, PRN Dialysis    lidocaine-prilocaine (Emla) 2.5-2.5 % cream, , Topical, PRN    sodium chloride 0.9 % IV bolus 100 mL, 100 mL, Intravenous, Q30 Min PRN **AND** albumin human (Albumin) 25% injection 25 g, 25 g, Intravenous, PRN Dialysis    epoetin donna (Epogen, Procrit) 55976 UNIT/ML injection 10,000 Units, 10,000 Units, Subcutaneous, Once per day on Monday Wednesday Friday    gabapentin (Neurontin) 250 MG/5ML oral solution 100 mg, 100 mg, Per G Tube, BID    nortriptyline (Pamelor) cap 10 mg, 10 mg, Oral, Nightly    HYDROmorphone (Dilaudid) 1 MG/ML injection 0.1 mg, 0.1 mg, Intravenous, Q2H PRN **OR** HYDROmorphone (Dilaudid) 1 MG/ML injection 0.2 mg, 0.2 mg, Intravenous, Q2H PRN **OR** HYDROmorphone (Dilaudid) 1 MG/ML injection 0.4 mg, 0.4 mg, Intravenous, Q2H PRN    amiodarone (Pacerone) tab 200 mg, 200 mg, Per G Tube, Daily    aspirin DR tab 81 mg, 81 mg, Oral, Daily    atorvastatin (Lipitor) tab 40 mg, 40 mg, Per G Tube, Nightly    carvedilol (Coreg) tab 25 mg, 25 mg, Per G Tube, BID    clopidogrel (Plavix) tab 75 mg, 75 mg, Oral, Daily    fluticasone propionate (Flonase) 50 MCG/ACT nasal suspension 2 spray, 2 spray, Nasal, Daily    insulin degludec (Tresiba) 100 units/mL flextouch 5 Units, 5 Units, Subcutaneous, Nightly    lansoprazole (Prevacid Solutab) disintegrating tab 30 mg, 30 mg, Per G Tube, QAM AC    glucose (Dex4) 15 GM/59ML oral liquid 15 g, 15 g, Oral, Q15 Min PRN **OR** glucose (Glutose) 40% oral gel 15 g, 15 g, Oral, Q15 Min PRN **OR** glucose-vitamin C (Dex-4) chewable tab 4 tablet, 4 tablet, Oral, Q15 Min PRN **OR** dextrose 50% injection 50 mL, 50 mL, Intravenous, Q15 Min PRN **OR** glucose (Dex4) 15 GM/59ML oral liquid 30 g, 30 g, Oral, Q15 Min PRN **OR** glucose (Glutose) 40% oral gel 30 g, 30 g, Oral, Q15 Min PRN **OR** glucose-vitamin C (Dex-4) chewable tab 8 tablet, 8 tablet, Oral,  Q15 Min PRN    heparin (Porcine) 5000 UNIT/ML injection 5,000 Units, 5,000 Units, Subcutaneous, Q8H STEPHANIE    acetaminophen (Tylenol Extra Strength) tab 500 mg, 500 mg, Oral, Q4H PRN    acetaminophen (Tylenol) tab 650 mg, 650 mg, Oral, Q4H PRN **OR** HYDROcodone-acetaminophen (Norco) 5-325 MG per tab 1 tablet, 1 tablet, Oral, Q4H PRN **OR** [DISCONTINUED] HYDROcodone-acetaminophen (Norco) 5-325 MG per tab 2 tablet, 2 tablet, Oral, Q4H PRN    polyethylene glycol (PEG 3350) (Miralax) 17 g oral packet 17 g, 17 g, Oral, Daily PRN    sennosides (Senokot) tab 17.2 mg, 17.2 mg, Oral, Nightly PRN    bisacodyl (Dulcolax) 10 MG rectal suppository 10 mg, 10 mg, Rectal, Daily PRN    insulin aspart (NovoLOG) 100 Units/mL FlexPen 1-7 Units, 1-7 Units, Subcutaneous, TID CC and HS    Review of Systems:  Pertinent items are noted in HPI.    Physical Exam:  Blood pressure 135/46, pulse 70, temperature 98.9 °F (37.2 °C), temperature source Axillary, resp. rate 18, height 1.626 m (5' 4\"), weight 67.1 kg (147 lb 14.4 oz), SpO2 98%.  Motor intact   Laboratory Data:  Lab Results   Component Value Date    WBC 10.4 12/09/2024    HGB 7.1 12/09/2024    HCT 22.4 12/09/2024    .0 12/09/2024    CREATSERUM 3.09 12/09/2024    BUN 27 12/09/2024     12/09/2024    K 3.4 12/09/2024    CL 99 12/09/2024    CO2 36.0 12/09/2024     12/09/2024    CA 9.3 12/09/2024    MG 2.3 12/09/2024    PHOS 1.6 12/09/2024       Impression:  Patient Active Problem List   Diagnosis    Mixed hyperlipidemia    Pulmonary HTN (HCC)    KRAIG (obstructive sleep apnea)    Gout    Type 2 diabetes mellitus with chronic kidney disease on chronic dialysis, with long-term current use of insulin (HCC)    Anemia of chronic renal failure    Chronic diastolic congestive heart failure (HCC)    Vitamin D deficiency    Chronic obstructive asthma (HCC)    Secondary hyperparathyroidism (HCC)    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  and stage 4 chronic kidney disease (HCC)    Atherosclerosis of native arteries of extremities with intermittent claudication, bilateral legs (HCC)    Primary hypertension    Smokers' cough (HCC)    Unstable angina (HCC)    Lower GI bleed    ESRD (end stage renal disease) on dialysis (HCC)    PAF (paroxysmal atrial fibrillation) ([facility])    AVM (arteriovenous malformation) of colon    ABLA (acute blood loss anemia)    Rectal bleeding    Anticoagulated    Antiplatelet or antithrombotic long-term use    Lower GI bleeding    ESRD on hemodialysis (HCC)    GI bleed    Closed displaced midcervical fracture of left femur with delayed healing    ESRD (end stage renal disease) (HCC)    Closed fracture of left hip (HCC)    Hyperphosphatemia    Respiratory failure (HCC)    Anemia    Palliative care by specialist    Thrombocytopenia ([facility])    Peripheral polyneuropathy       Recommendations:  Patient  takes  norco, but then awakens with sharp pain in the feet   Renal dosing of gabapentin, cut down to 100 BID   Continue with the norco at rehab   Thank you for allowing me to participate in the care of your patient.  Discussed with family  Time spent: 20    Talha Huggins MD           [1]   Allergies  Allergen Reactions    Adhesive Tape OTHER (SEE COMMENTS)     Severe rashes    Dust Mite Extract RASH

## 2024-12-09 NOTE — DISCHARGE INSTRUCTIONS
[facility] [phone]   Tabs  Take 1 tablet (17.2 mg total) by mouth nightly as needed (constipation, as needed if no bowel movement that day).     simethicone 125 MG Chew  Commonly known as: Mylicon  1 tablet (125 mg total) by Per G Tube route 4 (four) times daily with meals and nightly.     sodium bicarbonate 325 MG Tabs  Take 1 tablet (325 mg total) by mouth as needed (to unclog non-patent tubes).     traMADol 50 MG Tabs  Commonly known as: Ultram  Take 1 tablet (50 mg total) by mouth every 12 (twelve) hours as needed for Pain. Watch drowsiness no alcohol            CHANGE how you take these medications      acetaminophen 500 MG Tabs  Commonly known as: Tylenol Extra Strength  What changed: Another medication with the same name was removed. Continue taking this medication, and follow the directions you see here.     * albuterol (2.5 MG/3ML) 0.083% Nebu  Commonly known as: Ventolin  Take 3 mL (2.5 mg total) by nebulization every 4 (four) hours as needed for Wheezing or Shortness of Breath.  What changed: Another medication with the same name was removed. Continue taking this medication, and follow the directions you see here.     * albuterol 108 (90 Base) MCG/ACT Aers  Commonly known as: ProAir HFA  Inhale 2 puffs into the lungs every 4 (four) hours as needed for Wheezing.  What changed: Another medication with the same name was removed. Continue taking this medication, and follow the directions you see here.     * amiodarone 200 MG Tabs  Commonly known as: Pacerone  1 tablet (200 mg total) by Per G Tube route daily.  What changed: Another medication with the same name was added. Make sure you understand how and when to take each.     * amiodarone 200 MG Tabs  Commonly known as: Pacerone  1 tablet (200 mg total) by Per G Tube route 2 (two) times daily with meals. 200 mg bid for a week then daily;please note cardiology responsible for all dosing/renewal/and medication duration decisions  What changed: You were already taking a medication  with the same name, and this prescription was added. Make sure you understand how and when to take each.     aspirin 81 MG Tbec  Take 1 tablet (81 mg total) by mouth daily. Per G-tube; with food  What changed: additional instructions           * This list has 4 medication(s) that are the same as other medications prescribed for you. Read the directions carefully, and ask your doctor or other care provider to review them with you.                CONTINUE taking these medications      atorvastatin 40 MG Tabs  Commonly known as: Lipitor  1 tablet (40 mg total) by Per G Tube route nightly.     cetirizine 10 MG Tabs  Commonly known as: ZyrTEC     clopidogrel 75 MG Tabs  Commonly known as: Plavix  Take 1 tablet (75 mg total) by mouth daily.     Comfort Protect Adult Diaper/L Misc  1 Application daily.     [employer] Digital Thermometer [place] Misc  Generic drug: Electronic Thermometer  Check temperature     fluticasone propionate 50 MCG/ACT Susp  Commonly known as: Flonase  2 sprays by Nasal route daily.     Gabapentin 300 MG/6ML Soln  300 mg by Peg Tube route in the morning and 300 mg before bedtime.     * Insulin Pen Needle 32G X 4 MM Misc  Take as directed     * Insulin Pen Needle 33G X 4 MM Misc  1 each 4 (four) times daily.     lansoprazole 30 MG Tbdd  Commonly known as: Prevacid Solutab  1 tablet (30 mg total) by Per G Tube route every morning before breakfast.     polyethylene glycol (PEG 3350) 17 g Pack  Commonly known as: Miralax     Renal Multivitamin Formula Tabs  1 tablet by Per G Tube route daily.           * This list has 2 medication(s) that are the same as other medications prescribed for you. Read the directions carefully, and ask your doctor or other care provider to review them with you.                STOP taking these medications      amoxicillin clavulanate 250-125 MG Tabs  Commonly known as: Augmentin     carvedilol 25 MG Tabs  Commonly known as: Coreg     Cholecalciferol 125 MCG (5000 UT) Tabs  Commonly  known as: VITAMIN D-3     Gvoke HypoPen 2-Pack 1 MG/0.2ML injection  Generic drug: glucagon     HYDROcodone-acetaminophen 5-325 MG Tabs  Commonly known as: Norco     Insulin Lispro (1 Unit Dial) 100 UNIT/ML Sopn  Commonly known as: HumaLOG KwikPen     Lantus SoloStar 100 UNIT/ML Sopn  Generic drug: insulin glargine     lidocaine 4 % Ptch  Commonly known as: LIDODERM     Thick-It Pack  Generic drug: Starch-Maltodextrin     Tradjenta 5 MG Tabs  Generic drug: linaGLIPtin               Where to Get Your Medications        You can get these medications from any pharmacy    Bring a paper prescription for each of these medications  amiodarone 200 MG Tabs  aspirin 81 MG Tbec  dilTIAZem 60 MG Tabs  insulin regular human 100 UNIT/ML Soln  lidocaine-menthol 4-1 % Ptch  Metoprolol Tartrate 75 MG Tabs  nortriptyline 10 MG Caps  pancrelipase (Lip-Prot-Amyl) 12562-00196 units Cpep  Sennosides 17.2 MG Tabs  simethicone 125 MG Chew  sodium bicarbonate 325 MG Tabs  traMADol 50 MG Tabs

## 2024-12-10 PROBLEM — E83.39 HYPOPHOSPHATEMIA: Status: ACTIVE | Noted: 2024-10-07

## 2024-12-10 PROBLEM — E83.39 HYPOPHOSPHATEMIA: Status: ACTIVE | Noted: 2024-01-01

## 2024-12-10 LAB
ANION GAP SERPL CALC-SCNC: 5 MMOL/L (ref 0–18)
ANTIBODY SCREEN: NEGATIVE
BASOPHILS # BLD AUTO: 0.05 X10(3) UL (ref 0–0.2)
BASOPHILS NFR BLD AUTO: 0.5 %
BUN BLD-MCNC: 22 MG/DL (ref 9–23)
BUN/CREAT SERPL: 8.9 (ref 10–20)
CALCIUM BLD-MCNC: 9 MG/DL (ref 8.7–10.4)
CHLORIDE SERPL-SCNC: 98 MMOL/L (ref 98–112)
CO2 SERPL-SCNC: 33 MMOL/L (ref 21–32)
CREAT BLD-MCNC: 2.46 MG/DL
DEPRECATED RDW RBC AUTO: 60.8 FL (ref 35.1–46.3)
EGFRCR SERPLBLD CKD-EPI 2021: 26 ML/MIN/1.73M2 (ref 60–?)
EOSINOPHIL # BLD AUTO: 0.26 X10(3) UL (ref 0–0.7)
EOSINOPHIL NFR BLD AUTO: 2.6 %
ERYTHROCYTE [DISTWIDTH] IN BLOOD BY AUTOMATED COUNT: 17.5 % (ref 11–15)
GLUCOSE BLD-MCNC: 205 MG/DL (ref 70–99)
GLUCOSE BLDC GLUCOMTR-MCNC: 191 MG/DL (ref 70–99)
GLUCOSE BLDC GLUCOMTR-MCNC: 227 MG/DL (ref 70–99)
GLUCOSE BLDC GLUCOMTR-MCNC: 245 MG/DL (ref 70–99)
GLUCOSE BLDC GLUCOMTR-MCNC: 255 MG/DL (ref 70–99)
GLUCOSE BLDC GLUCOMTR-MCNC: 264 MG/DL (ref 70–99)
HCT VFR BLD AUTO: 21.5 %
HGB BLD-MCNC: 6.8 G/DL
HGB BLD-MCNC: 8.3 G/DL
IMM GRANULOCYTES # BLD AUTO: 0.09 X10(3) UL (ref 0–1)
IMM GRANULOCYTES NFR BLD: 0.9 %
LYMPHOCYTES # BLD AUTO: 1.47 X10(3) UL (ref 1–4)
LYMPHOCYTES NFR BLD AUTO: 14.5 %
MCH RBC QN AUTO: 30.6 PG (ref 26–34)
MCHC RBC AUTO-ENTMCNC: 31.6 G/DL (ref 31–37)
MCV RBC AUTO: 96.8 FL
MONOCYTES # BLD AUTO: 0.72 X10(3) UL (ref 0.1–1)
MONOCYTES NFR BLD AUTO: 7.1 %
NEUTROPHILS # BLD AUTO: 7.54 X10 (3) UL (ref 1.5–7.7)
NEUTROPHILS # BLD AUTO: 7.54 X10(3) UL (ref 1.5–7.7)
NEUTROPHILS NFR BLD AUTO: 74.4 %
OSMOLALITY SERPL CALC.SUM OF ELEC: 291 MOSM/KG (ref 275–295)
PHOSPHATE SERPL-MCNC: 1 MG/DL (ref 2.4–5.1)
PLATELET # BLD AUTO: 244 10(3)UL (ref 150–450)
POTASSIUM SERPL-SCNC: 3.5 MMOL/L (ref 3.5–5.1)
RBC # BLD AUTO: 2.22 X10(6)UL
RH BLOOD TYPE: POSITIVE
SODIUM SERPL-SCNC: 136 MMOL/L (ref 136–145)
WBC # BLD AUTO: 10.1 X10(3) UL (ref 4–11)

## 2024-12-10 PROCEDURE — 30233N1 TRANSFUSION OF NONAUTOLOGOUS RED BLOOD CELLS INTO PERIPHERAL VEIN, PERCUTANEOUS APPROACH: ICD-10-PCS | Performed by: HOSPITALIST

## 2024-12-10 PROCEDURE — 99233 SBSQ HOSP IP/OBS HIGH 50: CPT | Performed by: HOSPITALIST

## 2024-12-10 PROCEDURE — 99232 SBSQ HOSP IP/OBS MODERATE 35: CPT | Performed by: INTERNAL MEDICINE

## 2024-12-10 RX ORDER — SODIUM CHLORIDE 9 MG/ML
INJECTION, SOLUTION INTRAVENOUS ONCE
Status: COMPLETED | OUTPATIENT
Start: 2024-12-10 | End: 2024-12-10

## 2024-12-10 RX ORDER — FUROSEMIDE 10 MG/ML
20 INJECTION INTRAMUSCULAR; INTRAVENOUS ONCE
Status: COMPLETED | OUTPATIENT
Start: 2024-12-10 | End: 2024-12-10

## 2024-12-10 NOTE — CHRONIC PAIN
[facility]  Inpatient Progress Note     Ollie Hernández Patient Status:  Inpatient    1945 MRN [id number]   Location [facility] 5SW/SE Attending Dee Joyce,*   Hosp Day # 4 PCP Wili Parmar MD     Date of Admission:  2024  Date of Consult:  2024    Reason for Consultation:  Neuropathy   lumbar ddd ss    History of Present Illness:  Ollie Hernández is a a(n) 79 year old male with multiple medical co-morbidities most notable for SKD on HD, DM, CAD, Anemia, KRAIG, Pulm HTN and CHF admitted for worsening bilat feet burning pain.  Drowsy after norco   Neurontin renal dosed     History:  Past Medical History:    Anemia    Asthma (HCC)    Back problem    BPH (benign prostatic hyperplasia)    Calculus of kidney    Cataract    Coronary atherosclerosis    Diabetes (HCC)    ESRD (end stage renal disease) on dialysis (HCC)    Essential hypertension    High blood pressure    High cholesterol    History of blood transfusion    Hyperlipidemia    Neuropathy    hands and feet    KRAIG on CPAP    Renal disorder    Sleep apnea    Visual impairment    glasses    Vocal cord paralysis, unilateral partial     Past Surgical History:   Procedure Laterality Date    Appendectomy          Back surgery      Neck/back -     Capsule endoscopy - internal referral      Cataract extraction Right 2022    PHACOEMULSIFICATION WITH POSTERIOR CHAMBER INTRAOCULAR LENS, RIGHT EYE    Cataract extraction Left 2021    PHACOEMULSIFICATION WITH POSTERIOR CHAMBER INTRAOCULAR LENS, LEFT EYE    Cath bare metal stent (bms)      Cath drug eluting stent  2024    Successful IVUS guided PCI of the circumflex with a ABIMBOLA    Colonoscopy      Colonoscopy N/A 2021    Procedure: COLONOSCOPY;  Surgeon: Michael Gautam MD;  Location: [facility] ENDO    Colonoscopy N/A 2024    Procedure: COLONOSCOPY;  Surgeon: Gabriel Saldana MD;  Location: [facility] ENDOSCOPY    Colonoscopy N/A  06/15/2024    Procedure: COLONOSCOPY;  Surgeon: Carlyn Storey MD;  Location: [facility] ENDOSCOPY    Colonoscopy N/A 07/31/2024    Procedure: COLONOSCOPY;  Surgeon: Gabriel Saldana MD;  Location: [facility] ENDOSCOPY    Dialysis procedure  02/17/2023    right internal jugular tunneled dialysis catheter placement.    Hand/finger surgery unlisted      Accidental trauma    Spine surgery procedure unlisted      Total hip arthroplasty Left 10/04/2024    Left anterior total hip arthroplasty    Upper gi endoscopy,diagnosis       Family History   Problem Relation Age of Onset    Cancer Father         Kidney    Breast Cancer Mother     Diabetes Mother     Diabetes Maternal Grandmother     Diabetes Maternal Grandfather     Heart Disorder Other         Uncle      reports that he quit smoking about 43 years ago. His smoking use included cigarettes. He started smoking about 60 years ago. He has a 17 pack-year smoking history. He has never used smokeless tobacco. He reports that he does not drink alcohol and does not use drugs.    Allergies:  Allergies[1]    Medications:    Current Facility-Administered Medications:     furosemide (Lasix) 10 mg/mL injection 20 mg, 20 mg, Intravenous, Once    lidocaine-menthol 4-1 % patch 2 patch, 2 patch, Transdermal, Daily    LORazepam (Ativan) tab 0.5 mg, 0.5 mg, Oral, Q6H PRN    sodium ferric gluconate (Ferrlecit) 125 mg in sodium chloride 0.9% 100mL IVPB premix, 125 mg, Intravenous, Daily    heparin (Porcine) 1000 UNIT/ML injection 1,500 Units, 1.5 mL, Intravenous, PRN Dialysis    lidocaine-prilocaine (Emla) 2.5-2.5 % cream, , Topical, PRN    sodium chloride 0.9 % IV bolus 100 mL, 100 mL, Intravenous, Q30 Min PRN **AND** albumin human (Albumin) 25% injection 25 g, 25 g, Intravenous, PRN Dialysis    dextrose 10% infusion (TPN no rate), , Intravenous, Continuous PRN    pancrelipase (Lip-Prot-Amyl) (Zenpep) DR particles cap 10,000 Units, 10,000 Units, Per G Tube, PRN **AND** sodium bicarbonate tab 325 mg, 325  mg, Oral, PRN    metoprolol (Lopressor) 5 mg/5mL injection 5 mg, 5 mg, Intravenous, PRN **OR** metoprolol (Lopressor) 5 mg/5mL injection 2.5 mg, 2.5 mg, Intravenous, PRN    heparin (Porcine) 1000 UNIT/ML injection 1,500 Units, 1.5 mL, Intravenous, PRN Dialysis    lidocaine-prilocaine (Emla) 2.5-2.5 % cream, , Topical, PRN    epoetin donna (Epogen, Procrit) 69343 UNIT/ML injection 10,000 Units, 10,000 Units, Subcutaneous, Once per day on Monday Wednesday Friday    gabapentin (Neurontin) 250 MG/5ML oral solution 100 mg, 100 mg, Per G Tube, BID    nortriptyline (Pamelor) cap 10 mg, 10 mg, Oral, Nightly    HYDROmorphone (Dilaudid) 1 MG/ML injection 0.1 mg, 0.1 mg, Intravenous, Q2H PRN **OR** HYDROmorphone (Dilaudid) 1 MG/ML injection 0.2 mg, 0.2 mg, Intravenous, Q2H PRN **OR** HYDROmorphone (Dilaudid) 1 MG/ML injection 0.4 mg, 0.4 mg, Intravenous, Q2H PRN    amiodarone (Pacerone) tab 200 mg, 200 mg, Per G Tube, Daily    aspirin DR tab 81 mg, 81 mg, Oral, Daily    atorvastatin (Lipitor) tab 40 mg, 40 mg, Per G Tube, Nightly    carvedilol (Coreg) tab 25 mg, 25 mg, Per G Tube, BID    clopidogrel (Plavix) tab 75 mg, 75 mg, Oral, Daily    fluticasone propionate (Flonase) 50 MCG/ACT nasal suspension 2 spray, 2 spray, Nasal, Daily    insulin degludec (Tresiba) 100 units/mL flextouch 5 Units, 5 Units, Subcutaneous, Nightly    lansoprazole (Prevacid Solutab) disintegrating tab 30 mg, 30 mg, Per G Tube, QAM AC    glucose (Dex4) 15 GM/59ML oral liquid 15 g, 15 g, Oral, Q15 Min PRN **OR** glucose (Glutose) 40% oral gel 15 g, 15 g, Oral, Q15 Min PRN **OR** glucose-vitamin C (Dex-4) chewable tab 4 tablet, 4 tablet, Oral, Q15 Min PRN **OR** dextrose 50% injection 50 mL, 50 mL, Intravenous, Q15 Min PRN **OR** glucose (Dex4) 15 GM/59ML oral liquid 30 g, 30 g, Oral, Q15 Min PRN **OR** glucose (Glutose) 40% oral gel 30 g, 30 g, Oral, Q15 Min PRN **OR** glucose-vitamin C (Dex-4) chewable tab 8 tablet, 8 tablet, Oral, Q15 Min PRN    heparin  (Porcine) 5000 UNIT/ML injection 5,000 Units, 5,000 Units, Subcutaneous, Q8H STEPHANIE    acetaminophen (Tylenol Extra Strength) tab 500 mg, 500 mg, Oral, Q4H PRN    acetaminophen (Tylenol) tab 650 mg, 650 mg, Oral, Q4H PRN **OR** HYDROcodone-acetaminophen (Norco) 5-325 MG per tab 1 tablet, 1 tablet, Oral, Q4H PRN **OR** [DISCONTINUED] HYDROcodone-acetaminophen (Norco) 5-325 MG per tab 2 tablet, 2 tablet, Oral, Q4H PRN    polyethylene glycol (PEG 3350) (Miralax) 17 g oral packet 17 g, 17 g, Oral, Daily PRN    sennosides (Senokot) tab 17.2 mg, 17.2 mg, Oral, Nightly PRN    bisacodyl (Dulcolax) 10 MG rectal suppository 10 mg, 10 mg, Rectal, Daily PRN    insulin aspart (NovoLOG) 100 Units/mL FlexPen 1-7 Units, 1-7 Units, Subcutaneous, TID CC and HS    Review of Systems:  Pertinent items are noted in HPI.    Physical Exam:  Blood pressure 112/42, pulse 66, temperature 99 °F (37.2 °C), temperature source Oral, resp. rate 20, height 5' 4\" (1.626 m), weight 146 lb 14.4 oz (66.6 kg), SpO2 94%.  Motor intact   Laboratory Data:  Lab Results   Component Value Date    WBC 10.1 12/10/2024    HGB 6.8 12/10/2024    HCT 21.5 12/10/2024    .0 12/10/2024    CREATSERUM 2.46 12/10/2024    BUN 22 12/10/2024     12/10/2024    K 3.5 12/10/2024    CL 98 12/10/2024    CO2 33.0 12/10/2024     12/10/2024    CA 9.0 12/10/2024    PHOS 1.0 12/10/2024       Impression:  Patient Active Problem List   Diagnosis    Mixed hyperlipidemia    Pulmonary HTN (HCC)    KRAIG (obstructive sleep apnea)    Gout    Type 2 diabetes mellitus with chronic kidney disease on chronic dialysis, with long-term current use of insulin (HCC)    Anemia of chronic renal failure    Chronic diastolic congestive heart failure ([facility])    Vitamin D deficiency    Chronic obstructive asthma (HCC)    Secondary hyperparathyroidism (HCC)    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)     Atherosclerosis of native arteries of extremities with intermittent claudication, bilateral legs ([facility])    Primary hypertension    Smokers' cough ([facility])    Unstable angina ([facility])    Lower GI bleed    ESRD (end stage renal disease) on dialysis ([facility])    PAF (paroxysmal atrial fibrillation) ([facility])    AVM (arteriovenous malformation) of colon    ABLA (acute blood loss anemia)    Rectal bleeding    Anticoagulated    Antiplatelet or antithrombotic long-term use    Lower GI bleeding    ESRD on hemodialysis ([facility])    GI bleed    Closed displaced midcervical fracture of left femur with delayed healing    ESRD (end stage renal disease) ([facility])    Closed fracture of left hip ([facility])    Hyperphosphatemia    Respiratory failure ([facility])    Anemia    Palliative care by specialist    Thrombocytopenia ([facility])    Peripheral polyneuropathy       Recommendations:  Patient  takes  norco, but then awakens with sharp pain in the feet   Renal dosing of gabapentin, cut down to 100 BID   Continue with the norco at rehab   Thank you for allowing me to participate in the care of your patient.  Discussed with family    MRI lumbar spine to be completed; further recs pending the results of the study       Time spent: 20    Talha Huggins MD           [1]   Allergies  Allergen Reactions    Adhesive Tape OTHER (SEE COMMENTS)     Severe rashes    Dust Mite Extract RASH

## 2024-12-10 NOTE — PAYOR COMM NOTE
--------------  ADMISSION REVIEW     Payor: [employer]  Subscriber #:  [id number]  Authorization Number: [id number]    Admit date: 12/6/24  Admit time:  5:03 PM       History   HPI  79-year-old male who was in the hospital then in rehab for a month or just came home here with his wife declining not able to get up and move around not eating or drinking well and complaining of worsening bilateral foot pain.  He has been diagnosed with neuropathy.  He is on gabapentin.      ED Triage Vitals [12/06/24 1222]   /52   Pulse 69   Resp 18   Temp 98.2 °F (36.8 °C)   Temp src Temporal   SpO2 100 %   O2 Device None (Room air)   Head: Normocephalic and atraumatic.   Eyes: Conjunctivae are normal. Pupils are equal, round, and reactive to light.   Neck: Normal range of motion. Neck supple.   Cardiovascular: Normal rate, regular rhythm and intact distal pulses.    Pulmonary/Chest: Effort normal. No respiratory distress.   Abdominal: Soft. There is no tenderness. There is no guarding.   Musculoskeletal: Mild chronic appearing edema without significant pitting to both lower extremities.  Dorsalis pedis pulses strong.  No significant skin breakdown.  Neurological: Alert and oriented to person, place, and time.   Skin: Skin is warm and dry.   Psychiatric: Normal mood and affect.  Behavior is normal.     Labs Reviewed   BASIC METABOLIC PANEL (8) - Abnormal; Notable for the following components:       Result Value    Glucose 196 (*)     Sodium 133 (*)     Chloride 92 (*)     CO2 35.0 (*)     BUN 37 (*)     Creatinine 4.04 (*)     BUN/CREA Ratio 9.2 (*)     eGFR-Cr 15 (*)     All other components within normal limits   CBC WITH DIFFERENTIAL WITH PLATELET - Abnormal; Notable for the following components:    WBC 11.3 (*)     RBC 2.57 (*)     HGB 7.7 (*)     HCT 24.1 (*)     RDW-SD 55.0 (*)     RDW 16.4 (*)     Neutrophil Absolute Prelim 8.56 (*)     Neutrophil Absolute 8.56 (*)     All other components within normal  limits   HEPATIC FUNCTION PANEL (7) - Abnormal; Notable for the following components:    AST 42 (*)     Alkaline Phosphatase 123 (*)      XR CHEST AP PORTABLE  (CPT=71045)  Result Date: 12/6/2024  CONCLUSION:  1. Interval worsening in the chest with mild cardiomegaly and mild to moderate pulmonary congestive change interstitial edema suggesting cardiac failure/fluid overload.  Questionable small right pleural effusion.  I cannot definitely exclude underlying pneumonia/inflammatory process.  Correlate clinically and follow-up studies are advised.    Dictated by (CST): Ramón Schmidt MD on 12/06/2024 at 2:15 PM     Finalized by (CST): Ramón Schmidt MD on 12/06/2024 at 2:16 PM        Admission disposition: 12/6/2024  4:00 PM    Disposition and Plan   Clinical Impression:  1. Peripheral polyneuropathy    2. ESRD on hemodialysis (HCC)       Disposition:  Admit  12/6/2024  4:00 pm         HISTORY AND PHYSICAL      HISTORY OF PRESENT ILLNESS  This is a 79 year oldmale who presents complaining of bilateral lower extremity pain.  Patient states symptoms have been progressively worsening for some time.  Did note a significant increase in pain around 3 to 4 days prior.  Patient initially came to the ED and was prescribed gabapentin.  Patient has been trying without improvement of pain at home.  Patient states due to the pain he has been having difficulties with ambulation.  Of note, patient with recent left hip surgery and recently completed a stay at rehab.  Patient also with PEG tube for feeding after previously being diagnosed with aspiration pneumonia and inability to swallow.       /43   Pulse 70   Temp 98.2 °F (36.8 °C) (Temporal)   Resp 18   SpO2 100%      GENERAL:  Awake and alert  HEART:  Regular rhythm.  Regular rate   LUNGS:  Air entry was minimally decreased.  No increased work of breathing or wheezes   ABDOMEN: Soft and non-tender.  PEG tube in place  NEUROLOGICAL: Tenderness to palpation over bilateral  lower extremities.   PSYCHIATRIC: Normal mood     Lab 12/06/24  1359   RBC 2.57*   HGB 7.7*   HCT 24.1*   MCV 93.8   MCH 30.0   MCHC 32.0   RDW 16.4*   NEPRELIM 8.56*   WBC 11.3*   .0      Lab 12/06/24  1359   *   BUN 37*   CREATSERUM 4.04*   CA 9.5   ALB 3.2   *   K 4.0   CL 92*   CO2 35.0*   ALKPHO 123*   AST 42*   ALT 38   BILT 0.2   TP 6.9       ASSESSMENT/PLAN     Peripheral polyneuropathy  -Patient p/w bilateral lower extremity pain  -Significantly worse over the past 3 to 4 days.  -No real improvement with gabapentin at home  -Difficulties with ambulation due to pain  -Pain control as able  -PT/OT  -Continue to monitor     ESRD on HD  -Normally receives dialysis on Monday Wednesday Friday.  Last session was on Wednesday.  -Nephrology consulted, further HD per nephrology.     Anemia of chronic disease  -Likely secondary to ESRD as above  -Hemoglobin noted to be 7.7  -Continue to monitor     Type 2 DM   - Monitoring Blood glucose with POC checks  - SSI to cover hyperglycemia  - Hypoglycemia protocol  - Will monitor and adjust agents as needed.      Other problems  -KRAIG  -Hypertension  -Chronic dCHF  -Pulm HTN               12/7/24           Blood pressure 110/48, pulse 66, temperature 98.7 °F (37.1 °C), temperature source Oral, resp. rate 18, height 5' 4\" (1.626 m), weight 148 lb 9.6 oz (67.4 kg), SpO2 93%.       Appearance: He is ill-appearing. He is not toxic-appearing or diaphoretic.   Cardiovascular:      Rate and Rhythm: Normal rate.      Pulses: Normal pulses.   Pulmonary:      Breath sounds: Rhonchi present. No wheezing.   Abdominal:      General: Bowel sounds are normal.      Palpations: Abdomen is soft.   Skin:     General: Skin is warm and dry.      Capillary Refill: Capillary refill takes less than 2 seconds.   Neurological:      General: No focal deficit present.      Mental Status: He is alert.      Comments: sleepy   Psychiatric:         Behavior: Behavior normal.      Lab  Results   Component Value Date     WBC 11.1 (H) 12/07/2024     HGB 8.2 (L) 12/07/2024     HCT 25.3 (L) 12/07/2024     .0 12/07/2024     CREATSERUM 4.43 (H) 12/07/2024     BUN 37 (H) 12/07/2024      (L) 12/07/2024     K 3.9 12/07/2024     CL 95 (L) 12/07/2024     CO2 32.0 12/07/2024      (H) 12/07/2024     CA 9.6 12/07/2024     ALB 3.3 12/07/2024     ALKPHO 116 12/07/2024     BILT 0.3 12/07/2024     TP 6.7 12/07/2024     AST 32 12/07/2024     ALT 29 12/07/2024     MG 2.4 12/07/2024     Assessment & Plan:     Peripheral polyneuropathy; check rpr; thyroid; elctropheresis; b12 ok 7/24  -Patient p/w bilateral lower extremity pain  -Significantly worse over the past 3 to 4 days.  -No real improvement with gabapentin at home  -Difficulties with ambulation because of pain  -Pain control as able; pt screaming in pain or asleep; pain clinic called  -PT/OT  -Continue to monitor     ESRD on HD  -Normally receives dialysis on Monday Wednesday Friday.  Last session was on Wednesday.  -Nephrology consulted, further HD per nephrology.     Anemia of chronic disease  -Likely secondary to ESRD as above  -Hemoglobin noted to be 7.7  -Continue to monitor     Type 2 DM   - Monitoring Blood glucose with POC checks  - SSI to cover hyperglycemia  - Hypoglycemia protocol  - Will monitor and adjust agents as needed.      -KRAIG  -Hypertension  -Chronic dCHF  -Pulm HTN     [facility] has watchman ? If can have mri     Severe ls disk disease in 2023 12/8/24     Blood pressure 104/40, pulse 72, temperature 99.3 °F (37.4 °C), temperature source Oral, resp. rate 18, height 5' 4\" (1.626 m), weight 148 lb 1.6 oz (67.2 kg), SpO2 95%.       Appearance: He is ill-appearing. He is not toxic-appearing or diaphoretic.   Cardiovascular:      Rate and Rhythm: Normal rate.      Pulses: Normal pulses.   Pulmonary:      Breath sounds: Rhonchi present. No wheezing.   Abdominal:      General: Bowel sounds are normal.       Palpations: Abdomen is soft.   Skin:     General: Skin is warm and dry.      Capillary Refill: Capillary refill takes less than 2 seconds.   Neurological:      General: No focal deficit present.      Mental Status: He is alert.      Comments: sleepy   Psychiatric:         Behavior: Behavior normal.     Lab Results   Component Value Date     WBC 11.1 (H) 12/08/2024     HGB 7.0 (L) 12/08/2024     HCT 21.2 (L) 12/08/2024     .0 12/08/2024     CREATSERUM 5.29 (H) 12/08/2024     BUN 54 (H) 12/08/2024      (L) 12/08/2024     K 3.8 12/08/2024     CL 94 (L) 12/08/2024     CO2 32.0 12/08/2024      (H) 12/08/2024     CA 9.2 12/08/2024     ALB 3.0 (L) 12/08/2024     ALKPHO 116 12/07/2024     BILT 0.3 12/07/2024     TP 6.7 12/07/2024     AST 32 12/07/2024     ALT 29 12/07/2024     MG 2.4 12/08/2024     PHOS 2.1 (L) 12/08/2024       Assessment & Plan:     Peripheral polyneuropathy; checked rpr ok; thyroid; electropheresis; b12 ok 7/24  -Patient p/w bilateral lower extremity pain  -Significantly worse over the past 3 to 4 days.  -No real improvement with gabapentin at home  -Difficulties with ambulation because of pain  -Pain control as able; pt screaming in pain or asleep; pain clinic saw  -PT/OT  -Continue to monitor     ESRD on HD  -Normally receives dialysis on Monday Wednesday Friday.  Last session was on Wednesday.  -Nephrology consulted, further HD per nephrology.     Anemia of chronic disease  -Likely secondary to ESRD as above  -Hemoglobin noted to be 7.7  -Continue to monitor     Type 2 DM   - Monitoring Blood glucose with POC checks  - SSI to cover hyperglycemia  - Hypoglycemia protocol  - Will monitor and adjust agents as needed.      -KRAIG  -Hypertension  -Chronic dCHF  -Pulm HTN     [facility] has watchman can have mri per cards     Severe ls disk disease in 2023 12/9/24  Subjective:   HENT:  Negative for dental problem.    Respiratory:  Negative for cough and choking.     Musculoskeletal:  Positive for joint swelling and joint pain.   Neurological:  Negative for light-headedness.   Psychiatric/Behavioral:  Negative for decreased concentration.       Blood pressure 135/46, pulse 70, temperature 98.9 °F (37.2 °C), temperature source Axillary, resp. rate 18, height 5' 4\" (1.626 m), weight 147 lb 14.4 oz (67.1 kg), SpO2 98%.       Appearance: He is ill-appearing. He is not toxic-appearing or diaphoretic.   Cardiovascular:      Rate and Rhythm: Normal rate.      Pulses: Normal pulses.   Pulmonary:      Breath sounds: Rhonchi present. No wheezing.   Abdominal:      General: Bowel sounds are normal.      Palpations: Abdomen is soft.   Skin:     General: Skin is warm and dry.      Capillary Refill: Capillary refill takes less than 2 seconds.   Neurological:      General: No focal deficit present.      Mental Status: He is alert.   Psychiatric:         Behavior: Behavior normal.      Lab Results   Component Value Date     WBC 10.4 12/09/2024     HGB 7.1 (L) 12/09/2024     HCT 22.4 (L) 12/09/2024     .0 12/09/2024     CREATSERUM 3.09 (H) 12/09/2024     BUN 27 (H) 12/09/2024      12/09/2024     K 3.4 (L) 12/09/2024     CL 99 12/09/2024     CO2 36.0 (H) 12/09/2024      (H) 12/09/2024     CA 9.3 12/09/2024     ALB 3.0 (L) 12/08/2024     ALKPHO 116 12/07/2024     BILT 0.3 12/07/2024     TP 5.7 12/08/2024     AST 32 12/07/2024     ALT 29 12/07/2024     MG 2.3 12/09/2024     PHOS 1.6 (L) 12/09/2024     Assessment & Plan:     Peripheral polyneuropathy; checked rpr ok; thyroid; electropheresis; b12 ok 7/24  -Patient p/w bilateral lower extremity pain  -Significantly worse over the past 3 to 4 days.  -No real improvement with gabapentin at home  -Difficulties with ambulation because of pain  -Pain control as able; pt screaming in pain or asleep; pain clinic saw  -PT/OT  -Continue to monitor     ESRD on HD  -Normally receives dialysis on Monday Wednesday Friday.  Last session was  on Wednesday.  -Nephrology consulted, further HD per nephrology.     Anemia of chronic renal disease and iron defy check stool for blood  -Likely secondary to ESRD as above  -Hemoglobin noted to be 7.1  -Continue to monitor     Type 2 DM   - Monitoring Blood glucose with POC checks  - SSI to cover hyperglycemia  - Hypoglycemia protocol  - Will monitor and adjust agents as needed.      -KRAIG  -Hypertension  -Chronic dCHF  -Pulm HTN     [facility] has watchman dr romano put in in 9/2024; his assc dr coffey will see but likely compatible with nri     Severe ls disk disease in 2023; per pain recheck mri                NEPHROLOGY     Assessment and Plan:      79 year old male with past medical history of T2DM, HTN, HLD, ESRD on HD MWF via R AVF, CAD s/p PCI (5/2024), A.fib on Eliquis, HFpEF, KRAIG, BPH, history of recurrent gastrointestinal bleeding, who presented with bilateral lower extremity pain.      ESRD HD MWF:   HD today.      Bilateral lower extremity pain:   Work up neg so far.   On Gabapentin for neuropathy.   Pain control.      CAD/A.fib:   Cont Coreg, ASA/plavix.   Cont amiodarone.      Anemia:   Hb 7.1 today.   Continue EPO 10,000 units 3 times per week.       MEDICATIONS ADMINISTERED IN LAST 1 DAY:  Transfuse RBC       Date Action Dose Route User    12/10/2024 1204 New Bag (none) Intravenous Janice Sheth RN          amiodarone (Pacerone) tab 200 mg       Date Action Dose Route User    12/10/2024 0903 Given 200 mg Per G Tube Janice Sheth RN          aspirin DR tab 81 mg       Date Action Dose Route User    12/10/2024 0902 Given 81 mg Oral Janice Sheth RN          atorvastatin (Lipitor) tab 40 mg       Date Action Dose Route User    12/9/2024 2210 Given 40 mg Per G Tube Lilo Rivera RN          carvedilol (Coreg) tab 25 mg       Date Action Dose Route User    12/10/2024 0903 Given 25 mg Per G Tube Janice Sheth RN    12/9/2024 2210 Given 25 mg Per G Tube Lilo Rivera, ROSALINO          clopidogrel  (Plavix) tab 75 mg       Date Action Dose Route User    12/10/2024 0903 Given 75 mg Oral Janice Sheth RN          epoetin donna (Epogen, Procrit) 46113 UNIT/ML injection 10,000 Units       Date Action Dose Route User    12/9/2024 2210 Given 10,000 Units Subcutaneous (Left Lower Abdomen) Lilo Rivera RN          gabapentin (Neurontin) 250 MG/5ML oral solution 100 mg       Date Action Dose Route User    12/10/2024 1016 Given 100 mg Per G Tube Janice Sheth RN    12/9/2024 1848 Given 100 mg Per G Tube Kathi Siddiqui RN          heparin (Porcine) 5000 UNIT/ML injection 5,000 Units       Date Action Dose Route User    12/10/2024 0532 Given 5,000 Units Subcutaneous (Left Lower Abdomen) Lilo Rivera RN    12/9/2024 2210 Given 5,000 Units Subcutaneous (Left Upper Arm) Lilo Rivera RN    12/9/2024 1421 Given 5,000 Units Subcutaneous (Right Lower Abdomen) Kathi Siddiqui RN          HYDROcodone-acetaminophen (Norco) 5-325 MG per tab 1 tablet       Date Action Dose Route User    12/9/2024 2339 Given 1 tablet Oral Lilo Rivera RN    12/9/2024 1847 Given 1 tablet Oral Kathi Siddiqui RN    12/9/2024 1426 Given 1 tablet Oral Kathi Siddiqui RN          insulin aspart (NovoLOG) 100 Units/mL FlexPen 1-7 Units       Date Action Dose Route User    12/10/2024 1214 Given 4 Units Subcutaneous (Left Upper Arm) Janice Sheth RN    12/10/2024 0922 Given 4 Units Subcutaneous (Left Upper Arm) Janice Sheth RN    12/9/2024 2210 Given 3 Units Subcutaneous (Right Lower Arm) Lilo Rivera RN    12/9/2024 1835 Given 2 Units Subcutaneous (Right Lower Abdomen) Kathi Siddiqui RN          insulin degludec (Tresiba) 100 units/mL flextouch 5 Units       Date Action Dose Route User    12/9/2024 2210 Given 5 Units Subcutaneous (Right Lower Abdomen) Lilo Rivera RN          lansoprazole (Prevacid Solutab) disintegrating tab 30 mg       Date Action Dose Route User    12/10/2024 0532 Given 30 mg Per G Tube Lilo Rivera, RN           lidocaine-menthol 4-1 % patch 2 patch       Date Action Dose Route User    12/10/2024 0904 Patch Applied 1 patch Transdermal (Right Leg) Janice Sheth RN    12/9/2024 1422 Patch Applied 1 patch Transdermal (Right Leg) Kathi Siddiqui RN          nortriptyline (Pamelor) cap 10 mg       Date Action Dose Route User    12/9/2024 2210 Given 10 mg Oral Lilo Rivera RN          sennosides (Senokot) tab 17.2 mg       Date Action Dose Route User    12/9/2024 2210 Given 17.2 mg Oral Lilo Rivera RN          sodium chloride 0.9% infusion       Date Action Dose Route User    12/10/2024 0900 New Bag (none) Intravenous Janice Sheth RN          sodium chloride 0.9 % IV bolus 250 mL       Date Action Dose Route User    12/9/2024 2204 New Bag 250 mL Intravenous Lilo Rivera RN          sodium chloride 0.9 % IV bolus 250 mL       Date Action Dose Route User    12/10/2024 0202 New Bag 250 mL Intravenous Lilo Rivera RN            Vitals (last day)       Date/Time Temp Pulse Resp BP SpO2 Weight O2 Device O2 Flow Rate (L/min) [facility]    12/10/24 1219 98.7 °F (37.1 °C) -- 20 117/42 95 % -- -- --     12/10/24 1204 97.6 °F (36.4 °C) -- 20 102/43 95 % -- -- --     12/10/24 0852 97.9 °F (36.6 °C) -- 20 121/47 94 % -- None (Room air) --     12/10/24 0310 98.5 °F (36.9 °C) -- 20 105/42 92 % -- None (Room air) -- CN    12/09/24 2151 99.2 °F (37.3 °C) 70 20 -- 93 % -- None (Room air) -- CN    12/09/24 1701 98.7 °F (37.1 °C) 73 18 109/45 91 % -- None (Room air) -- GA    12/09/24 0612 98.9 °F (37.2 °C) 67 18 118/72 96 % -- None (Room air) -- PS     Blood Transfusion Record       Product Unit Status Volume Start End            Transfuse RBC       24  [id number]  *-[id number] Transfusing 250 mL 12/10/24 1204

## 2024-12-10 NOTE — PROGRESS NOTES
[facility]  part of [facility]    Progress Note    Ollie Hernández Patient Status:  Inpatient    1945 MRN [id number]   Location [facility] 5SW/SE Attending Dee Joyce,*   Hosp Day # 4 PCP Wili Parmar MD     Chief Complaint: foot pain    Subjective:     Constitutional:  Negative for appetite change.   HENT:  Negative for dental problem.    Respiratory:  Negative for cough and choking.    Musculoskeletal:  Positive for joint swelling and joint pain.   Neurological:  Negative for light-headedness.   Psychiatric/Behavioral:  Negative for decreased concentration.         Sleepy       Objective:   Blood pressure 113/43, pulse 66, temperature 98.3 °F (36.8 °C), temperature source Oral, resp. rate 20, height 5' 4\" (1.626 m), weight 146 lb 14.4 oz (66.6 kg), SpO2 94%.  Physical Exam  Vitals and nursing note reviewed.   Constitutional:       General: He is not in acute distress.     Appearance: He is ill-appearing. He is not toxic-appearing or diaphoretic.   Cardiovascular:      Rate and Rhythm: Normal rate.      Pulses: Normal pulses.   Pulmonary:      Breath sounds: Rhonchi present. No wheezing.   Abdominal:      General: Bowel sounds are normal.      Palpations: Abdomen is soft.   Skin:     General: Skin is warm and dry.      Capillary Refill: Capillary refill takes less than 2 seconds.   Neurological:      General: No focal deficit present.      Mental Status: He is alert.   Psychiatric:         Behavior: Behavior normal.         Results:   Lab Results   Component Value Date    WBC 10.1 12/10/2024    HGB 8.3 (L) 12/10/2024    HCT 21.5 (L) 12/10/2024    .0 12/10/2024    CREATSERUM 2.46 (H) 12/10/2024    BUN 22 12/10/2024     12/10/2024    K 3.5 12/10/2024    CL 98 12/10/2024    CO2 33.0 (H) 12/10/2024     (H) 12/10/2024    CA 9.0 12/10/2024    ALB 3.0 (L) 2024    ALKPHO 116 2024    BILT 0.3 2024    TP 5.7 2024    AST 32  12/07/2024    ALT 29 12/07/2024    PTT 30.1 08/09/2024    INR 1.45 (H) 08/09/2024    T4F 0.9 08/31/2022    TSH 2.844 07/04/2024     (H) 07/30/2024    PSA 2.94 10/20/2021    DDIMER 6.07 (H) 09/29/2024    ESRML 10 06/16/2024    CRP 1.30 (H) 06/16/2024    MG 2.3 12/09/2024    PHOS 1.0 (LL) 12/10/2024    TROP <0.045 07/25/2019    TROPHS 52 12/03/2024     08/05/2023    B12 672 07/04/2024       No results found.        Assessment & Plan:     Peripheral polyneuropathy; checked rpr ok; thyroid; electropheresis; b12 ok 7/24  -Patient p/w bilateral lower extremity pain  -Significantly worse over the past 3 to 4 days.  -No real improvement with gabapentin at home  -Difficulties with ambulation because of pain  -Pain control as able; pt screaming in pain or asleep; pain clinic saw; await mri  poss steroid   -PT/OT  -Continue to monitor     ESRD on HD  -Normally receives dialysis on Monday Wednesday Friday.  Last session was on Wednesday.  -Nephrology consulted, further HD per nephrology.     Anemia of chronic renal disease and iron defy checked stool for blood, neg;  -Likely secondary to ESRD as above  -Hemoglobin noted to be 7.1  -Continue to monitor     Type 2 DM will be worsened  - Monitoring Blood glucose with POC checks  - SSI to cover hyperglycemia  - Hypoglycemia protocol  - Will monitor and adjust agents as needed.      -KRAIG  -Hypertension  -Chronic dCHF  -Pulm HTN    [facility] has watchman dr romano put in in 9/2024; mri ok with watchman per dr coffey    Severe ls disk disease in 2023; per pain recheck mri; hope to inj        Cad with left circ stent 5/24 poss need steroid inj; will hold and notify cards dr gale    Patient will require inpatient services that will reasonably be expected to span two midnight's based on the clinical documentation in H+P.   Based on patients current state of illness, I anticipate that, after discharge, patient will require TBD.  Complex mdm; coordinating care with  nurse/duly cards and counseling pt and with his permission his wife on phone about labs/neuropathy/mri/plavix/stent    BRITTANY WAY MD

## 2024-12-10 NOTE — PLAN OF CARE
Problem: Diabetes/Glucose Control  Goal: Glucose maintained within prescribed range  Description: INTERVENTIONS:  - Monitor Blood Glucose as ordered  - Assess for signs and symptoms of hyperglycemia and hypoglycemia  - Administer ordered medications to maintain glucose within target range  - Assess barriers to adequate nutritional intake and initiate nutrition consult as needed  - Instruct patient on self management of diabetes  Outcome: Progressing     Problem: MUSCULOSKELETAL - ADULT  Goal: Return mobility to safest level of function  Description: INTERVENTIONS:  - Assess patient stability and activity tolerance for standing, transferring and ambulating w/ or w/o assistive devices  - Assist with transfers and ambulation using safe patient handling equipment as needed  - Ensure adequate protection for wounds/incisions during mobilization  - Obtain PT/OT consults as needed  - Advance activity as appropriate  - Communicate ordered activity level and limitations with patient/family  Outcome: Progressing     Problem: SAFETY ADULT - FALL  Goal: Free from fall injury  Description: INTERVENTIONS:  - Assess pt frequently for physical needs  - Identify cognitive and physical deficits and behaviors that affect risk of falls.  - [place] fall precautions as indicated by assessment.  - Educate pt/family on patient safety including physical limitations  - Instruct pt to call for assistance with activity based on assessment  - Modify environment to reduce risk of injury  - Provide assistive devices as appropriate  - Consider OT/PT consult to assist with strengthening/mobility  - Encourage toileting schedule  Outcome: Progressing     Problem: DISCHARGE PLANNING  Goal: Discharge to home or other facility with appropriate resources  Description: INTERVENTIONS:  - Identify barriers to discharge w/pt and caregiver  - Include patient/family/discharge partner in discharge planning  - Arrange for needed discharge resources and  transportation as appropriate  - Identify discharge learning needs (meds, wound care, etc)  - Arrange for interpreters to assist at discharge as needed  - Consider post-discharge preferences of patient/family/discharge partner  - Complete POLST form as appropriate  - Assess patient's ability to be responsible for managing their own health  - Refer to Case Management Department for coordinating discharge planning if the patient needs post-hospital services based on physician/LIP order or complex needs related to functional status, cognitive ability or social support system  Outcome: Progressing     Problem: PAIN - ADULT  Goal: Verbalizes/displays adequate comfort level or patient's stated pain goal  Description: INTERVENTIONS:  - Encourage pt to monitor pain and request assistance  - Assess pain using appropriate pain scale  - Administer analgesics based on type and severity of pain and evaluate response  - Implement non-pharmacological measures as appropriate and evaluate response  - Consider cultural and social influences on pain and pain management  - Manage/alleviate anxiety  - Utilize distraction and/or relaxation techniques  - Monitor for opioid side effects  - Notify MD/LIP if interventions unsuccessful or patient reports new pain  - Anticipate increased pain with activity and pre-medicate as appropriate  Outcome: Not Progressing

## 2024-12-10 NOTE — PROGRESS NOTES
[facility]  part of [facility]    Nephrology Progress Note    Chief Complaint   Patient presents with    Foot Pain       Subjective:   79 year old male, ESRD on HD.     Complains of pain in legs.   HD yesterday.     Review of Systems:   Review of Systems   Constitutional:  Negative for chills, fatigue and fever.   Respiratory:  Negative for cough and shortness of breath.    Cardiovascular:  Negative for chest pain and leg swelling.   Gastrointestinal:  Negative for abdominal pain, constipation, diarrhea, nausea and vomiting.   Genitourinary:  Negative for difficulty urinating, dysuria and flank pain.   Musculoskeletal:  Positive for arthralgias.       Objective:   Temp:  [97.9 °F (36.6 °C)-99.2 °F (37.3 °C)] 97.9 °F (36.6 °C)  Pulse:  [65-73] 66  Resp:  [18-20] 20  BP: ()/(39-96) 121/47  SpO2:  [91 %-98 %] 94 %  SpO2: 94 %     Intake/Output Summary (Last 24 hours) at 12/10/2024 0923  Last data filed at 12/10/2024 0700  Gross per 24 hour   Intake 2101 ml   Output 1000 ml   Net 1101 ml     Wt Readings from Last 3 Encounters:   12/10/24 146 lb 14.4 oz (66.6 kg)   11/30/24 145 lb (65.8 kg)   10/22/24 132 lb 14.4 oz (60.3 kg)     Physical Exam  Constitutional:       Appearance: Normal appearance.   Cardiovascular:      Rate and Rhythm: Normal rate and regular rhythm.      Heart sounds: Normal heart sounds.      Comments: R upper arm AVF-pos bruit/thrill  Pulmonary:      Effort: Pulmonary effort is normal.      Breath sounds: Normal breath sounds.   Musculoskeletal:         General: Normal range of motion.   Skin:     General: Skin is warm and dry.   Neurological:      General: No focal deficit present.      Mental Status: He is alert and oriented to person, place, and time.   Psychiatric:         Mood and Affect: Mood normal.         Behavior: Behavior normal.         Medications:  Current Facility-Administered Medications   Medication Dose Route Frequency    sodium chloride 0.9% infusion    Intravenous Once    furosemide (Lasix) 10 mg/mL injection 20 mg  20 mg Intravenous Once    lidocaine-menthol 4-1 % patch 2 patch  2 patch Transdermal Daily    LORazepam (Ativan) tab 0.5 mg  0.5 mg Oral Q6H PRN    sodium ferric gluconate (Ferrlecit) 125 mg in sodium chloride 0.9% 100mL IVPB premix  125 mg Intravenous Daily    heparin (Porcine) 1000 UNIT/ML injection 1,500 Units  1.5 mL Intravenous PRN Dialysis    lidocaine-prilocaine (Emla) 2.5-2.5 % cream   Topical PRN    sodium chloride 0.9 % IV bolus 100 mL  100 mL Intravenous Q30 Min PRN    And    albumin human (Albumin) 25% injection 25 g  25 g Intravenous PRN Dialysis    dextrose 10% infusion (TPN no rate)   Intravenous Continuous PRN    pancrelipase (Lip-Prot-Amyl) (Zenpep) DR particles cap 10,000 Units  10,000 Units Per G Tube PRN    And    sodium bicarbonate tab 325 mg  325 mg Oral PRN    metoprolol (Lopressor) 5 mg/5mL injection 5 mg  5 mg Intravenous PRN    Or    metoprolol (Lopressor) 5 mg/5mL injection 2.5 mg  2.5 mg Intravenous PRN    heparin (Porcine) 1000 UNIT/ML injection 1,500 Units  1.5 mL Intravenous PRN Dialysis    lidocaine-prilocaine (Emla) 2.5-2.5 % cream   Topical PRN    epoetin donna (Epogen, Procrit) 06388 UNIT/ML injection 10,000 Units  10,000 Units Subcutaneous Once per day on Monday Wednesday Friday    gabapentin (Neurontin) 250 MG/5ML oral solution 100 mg  100 mg Per G Tube BID    nortriptyline (Pamelor) cap 10 mg  10 mg Oral Nightly    HYDROmorphone (Dilaudid) 1 MG/ML injection 0.1 mg  0.1 mg Intravenous Q2H PRN    Or    HYDROmorphone (Dilaudid) 1 MG/ML injection 0.2 mg  0.2 mg Intravenous Q2H PRN    Or    HYDROmorphone (Dilaudid) 1 MG/ML injection 0.4 mg  0.4 mg Intravenous Q2H PRN    amiodarone (Pacerone) tab 200 mg  200 mg Per G Tube Daily    aspirin DR tab 81 mg  81 mg Oral Daily    atorvastatin (Lipitor) tab 40 mg  40 mg Per G Tube Nightly    carvedilol (Coreg) tab 25 mg  25 mg Per G Tube BID    clopidogrel (Plavix) tab 75 mg  75  mg Oral Daily    fluticasone propionate (Flonase) 50 MCG/ACT nasal suspension 2 spray  2 spray Nasal Daily    insulin degludec (Tresiba) 100 units/mL flextouch 5 Units  5 Units Subcutaneous Nightly    lansoprazole (Prevacid Solutab) disintegrating tab 30 mg  30 mg Per G Tube QAM AC    glucose (Dex4) 15 GM/59ML oral liquid 15 g  15 g Oral Q15 Min PRN    Or    glucose (Glutose) 40% oral gel 15 g  15 g Oral Q15 Min PRN    Or    glucose-vitamin C (Dex-4) chewable tab 4 tablet  4 tablet Oral Q15 Min PRN    Or    dextrose 50% injection 50 mL  50 mL Intravenous Q15 Min PRN    Or    glucose (Dex4) 15 GM/59ML oral liquid 30 g  30 g Oral Q15 Min PRN    Or    glucose (Glutose) 40% oral gel 30 g  30 g Oral Q15 Min PRN    Or    glucose-vitamin C (Dex-4) chewable tab 8 tablet  8 tablet Oral Q15 Min PRN    heparin (Porcine) 5000 UNIT/ML injection 5,000 Units  5,000 Units Subcutaneous Q8H STEPHANIE    acetaminophen (Tylenol Extra Strength) tab 500 mg  500 mg Oral Q4H PRN    acetaminophen (Tylenol) tab 650 mg  650 mg Oral Q4H PRN    Or    HYDROcodone-acetaminophen (Norco) 5-325 MG per tab 1 tablet  1 tablet Oral Q4H PRN    polyethylene glycol (PEG 3350) (Miralax) 17 g oral packet 17 g  17 g Oral Daily PRN    sennosides (Senokot) tab 17.2 mg  17.2 mg Oral Nightly PRN    bisacodyl (Dulcolax) 10 MG rectal suppository 10 mg  10 mg Rectal Daily PRN    insulin aspart (NovoLOG) 100 Units/mL FlexPen 1-7 Units  1-7 Units Subcutaneous TID CC and HS              Results:     Recent Labs   Lab 12/08/24  0549 12/09/24  0546 12/10/24  0534   RBC 2.24* 2.35* 2.22*   HGB 7.0* 7.1* 6.8*   HCT 21.2* 22.4* 21.5*   MCV 94.6 95.3 96.8   NEPRELIM 8.02* 7.43 7.54   WBC 11.1* 10.4 10.1   .0 249.0 244.0     Recent Labs   Lab 12/06/24  1359 12/07/24  0547 12/08/24  0158 12/08/24  0549 12/09/24  0546 12/10/24  0534   * 129*  --  184* 114* 205*   BUN 37* 37*  --  54* 27* 22   CREATSERUM 4.04* 4.43*  --  5.29* 3.09* 2.46*   CA 9.5 9.6  --  9.2 9.3 9.0    ALB 3.2 3.3 2.25* 3.0*  --   --    * 133*  --  133* 137 136   K 4.0 3.9  --  3.8 3.4* 3.5   CL 92* 95*  --  94* 99 98   CO2 35.0* 32.0  --  32.0 36.0* 33.0*   ALKPHO 123* 116  --   --   --   --    AST 42* 32  --   --   --   --    ALT 38 29  --   --   --   --    BILT 0.2 0.3  --   --   --   --    TP 6.9 6.7 5.7  --   --   --      PTT   Date Value Ref Range Status   08/09/2024 30.1 23.0 - 36.0 seconds Final     INR   Date Value Ref Range Status   08/09/2024 1.45 (H) 0.80 - 1.20 Final     Comment:     Therapeutic INR range for patients on warfarin:  2.0-3.0 for most medical conditions and surgical prophylaxis   2.5-3.5 for mechanical heart valves and recurrent thromboembolism    Direct thrombin inhibitors (e.g. argatroban, bivalirudin), factor Xa inhibitors (e.g. apixaban, rivaroxaban), and conditions such as coagulation factor deficiency, vitamin K deficiency, and liver disease will prolong the prothrombin time/INR.    Unfractionated heparin and low molecular weight heparin do not affect the prothrombin time/INR up to a concentration of 1 IU/mL and 1.5 IU/mL, respectively.         No results for input(s): \"BNP\" in the last 168 hours.  Recent Labs   Lab 12/07/24  0547 12/08/24  0549 12/09/24  0546   MG 2.4 2.4 2.3   PHOS  --  2.1* 1.6*        Recent Labs   Lab 12/07/24  0547 12/08/24  0158 12/08/24  0549 12/09/24  0546   PHOS  --   --  2.1* 1.6*   ALB 3.3 2.25* 3.0*  --          Assessment and Plan:     79 year old male with past medical history of T2DM, HTN, HLD, ESRD on HD MWF via R AVF, CAD s/p PCI (5/2024), A.fib on Eliquis, HFpEF, KRAIG, BPH, history of recurrent gastrointestinal bleeding, who presented with bilateral lower extremity pain.      ESRD HD MWF:   Next HD tomorrow.      Bilateral lower extremity pain:   Work up neg so far.   On Gabapentin for neuropathy.   Pain control.      CAD/A.fib:   Cont Coreg, ASA/plavix.   Cont amiodarone.      Anemia:   Hb decreased to 6.8 today. For 1 unit pRBCs today.    Continue EPO 10,000 units 3 times per week.    Hypophosphatemia:   Phos 1 today. Potassium phos ordered. On Jevity tube feeds.       Mirta Redman MD  12/10/2024

## 2024-12-10 NOTE — CONSULTS
Cardiology Consultation  [facility]    Ollie Hernández Patient Status:  Inpatient    1945 MRN [id number]   Location [facility] 5SW/SE Attending Dee Joyce,*   Hosp Day # 4 PCP Wili Parmar MD     Reason for Consultation:  Watchman    History of Present Illness:  Ollie Hernández is a a(n) 79 year old male with afib, CAD, T2DM, ESRD, HTN, HDL, neuropathy who underwent Watchman on 24 at [facility]. Cardiology asked for MRI compatibility of device. Patient without CV symptoms.    Assessment/Plan:    Afib s/p Watchman 24  CAD s/p PCI LCX with [place] ABIMBOLA x 1 (24)   HFpEF  ESRD on iHD  T2DM  KRAIG  HTN  Polyneuropathy, needing MRI    -- No contraindication to MRI with Watchman device. Device is MRI conditional, and patient is 45 days out from procedure.  -- Continue home GDMT  -- continue DAPT  -- Amio 200mg    History:  Past Medical History:    Anemia    Asthma (HCC)    Back problem    BPH (benign prostatic hyperplasia)    Calculus of kidney    Cataract    Coronary atherosclerosis    Diabetes (HCC)    ESRD (end stage renal disease) on dialysis (HCC)    Essential hypertension    High blood pressure    High cholesterol    History of blood transfusion    Hyperlipidemia    Neuropathy    hands and feet    KRAIG on CPAP    Renal disorder    Sleep apnea    Visual impairment    glasses    Vocal cord paralysis, unilateral partial     Past Surgical History:   Procedure Laterality Date    Appendectomy          Back surgery      Neck/back -     Capsule endoscopy - internal referral      Cataract extraction Right 2022    PHACOEMULSIFICATION WITH POSTERIOR CHAMBER INTRAOCULAR LENS, RIGHT EYE    Cataract extraction Left 2021    PHACOEMULSIFICATION WITH POSTERIOR CHAMBER INTRAOCULAR LENS, LEFT EYE    Cath bare metal stent (bms)      Cath drug eluting stent  2024    Successful IVUS guided PCI of the circumflex with a ABIMBOLA    Colonoscopy      Colonoscopy  N/A 01/25/2021    Procedure: COLONOSCOPY;  Surgeon: Michael Gautam MD;  Location: [facility] ENDO    Colonoscopy N/A 06/03/2024    Procedure: COLONOSCOPY;  Surgeon: Gabriel Saldana MD;  Location: [facility] ENDOSCOPY    Colonoscopy N/A 06/15/2024    Procedure: COLONOSCOPY;  Surgeon: Carlyn Storey MD;  Location: [facility] ENDOSCOPY    Colonoscopy N/A 07/31/2024    Procedure: COLONOSCOPY;  Surgeon: Gabriel Saldana MD;  Location: [facility] ENDOSCOPY    Dialysis procedure  02/17/2023    right internal jugular tunneled dialysis catheter placement.    Hand/finger surgery unlisted      Accidental trauma    Spine surgery procedure unlisted      Total hip arthroplasty Left 10/04/2024    Left anterior total hip arthroplasty    Upper gi endoscopy,diagnosis       Family History   Problem Relation Age of Onset    Cancer Father         Kidney    Breast Cancer Mother     Diabetes Mother     Diabetes Maternal Grandmother     Diabetes Maternal Grandfather     Heart Disorder Other         Uncle      reports that he quit smoking about 43 years ago. His smoking use included cigarettes. He started smoking about 60 years ago. He has a 17 pack-year smoking history. He has never used smokeless tobacco. He reports that he does not drink alcohol and does not use drugs.    Allergies:  Allergies[1]    Medications:  Medications Ordered Prior to Encounter[2]    Review of Systems:  As above, otherwise negative.      Physical Exam:  Blood pressure 117/52, pulse 66, temperature 98.5 °F (36.9 °C), temperature source Oral, resp. rate 20, height 5' 4\" (1.626 m), weight 146 lb 14.4 oz (66.6 kg), SpO2 92%.  Wt Readings from Last 3 Encounters:   12/10/24 146 lb 14.4 oz (66.6 kg)   11/30/24 145 lb (65.8 kg)   10/22/24 132 lb 14.4 oz (60.3 kg)       General: awake, alert, oriented x 3, no acute distress  HEENT: at/nc, perrl, eomi  Neck: No JVD, carotids 2+ no bruits.  Cardiac: Regular rate and rhythm, S1, S2 normal, no murmur, rub or gallop.  Lungs: Clear without wheezes,  rales, rhonchi or dullness.  Normal excursions and effort.  Abdomen: Soft, non-tender, non-distended, normal bowel sounds   Extremities: Without clubbing, cyanosis or edema.  Peripheral pulses are 2+.  Neurologic: Alert and oriented, normal affect.  Psych: normal mood and affect  Skin: Warm and dry.       Laboratories and Data:  Diagnostics:      Labs:   CBC:    Lab Results   Component Value Date    WBC 10.1 12/10/2024    WBC 10.4 12/09/2024    WBC 11.1 (H) 12/08/2024     Lab Results   Component Value Date    HGB 6.8 (LL) 12/10/2024    HGB 7.1 (L) 12/09/2024    HGB 7.0 (L) 12/08/2024      Lab Results   Component Value Date    .0 12/10/2024    .0 12/09/2024    .0 12/08/2024     BMP:   No results found for: \"GLUCOSE\"  Lab Results   Component Value Date    K 3.5 12/10/2024    K 3.4 (L) 12/09/2024    K 3.8 12/08/2024     Lab Results   Component Value Date    BUN 22 12/10/2024    BUN 27 (H) 12/09/2024    BUN 54 (H) 12/08/2024     Lab Results   Component Value Date    CREATSERUM 2.46 (H) 12/10/2024    CREATSERUM 3.09 (H) 12/09/2024    CREATSERUM 5.29 (H) 12/08/2024     Cholesterol:     Lab Results   Component Value Date    CHOLEST 179 09/29/2024    CHOLEST 146 07/31/2024    CHOLEST 153 07/04/2024     Lab Results   Component Value Date    HDL 41 09/29/2024    HDL 29 (L) 07/31/2024    HDL 41 07/04/2024     Lab Results   Component Value Date    TRIG 259 (H) 10/17/2024    TRIG 160 (H) 10/04/2024    TRIG 206 (H) 10/02/2024     Lab Results   Component Value Date     (H) 09/29/2024    LDL 93 07/31/2024     (H) 07/04/2024     Lab Results   Component Value Date    AST 32 12/07/2024    AST 42 (H) 12/06/2024    AST 71 (H) 12/03/2024     Lab Results   Component Value Date    ALT 29 12/07/2024    ALT 38 12/06/2024    ALT 57 (H) 12/03/2024           Luis Orozco MD  Interventional Cardiology  [place]  12/10/2024  8:03 AM         [1]   Allergies  Allergen Reactions    Adhesive Tape OTHER (SEE COMMENTS)      Severe rashes    Dust Mite Extract RASH   [2]   Current Facility-Administered Medications on File Prior to Encounter   Medication Dose Route Frequency Provider Last Rate Last Admin    [COMPLETED] acetaminophen (Tylenol) 160 MG/5ML oral liquid 500 mg  500 mg Per G Tube Once Gabriela Allen MD   500 mg at 24 0209    [COMPLETED] gabapentin (Neurontin) 250 MG/5ML oral solution 300 mg  300 mg Per G Tube Once Gabriela Allen MD   300 mg at 24 0440    [COMPLETED] ceFAZolin (Ancef) 2g in 10mL IV syringe premix  2 g Intravenous Once Yash Sheppard MD   2 g at 10/19/24 1036    [] adult 3 in 1 TPN   Intravenous Continuous TPN Pranay Michel MD   Paused at 10/19/24 0250    [COMPLETED] sodium chloride 0.9 % IV bolus 250 mL  250 mL Intravenous Once Lorelei Mata  mL/hr at 10/17/24 0023 250 mL at 10/17/24 0023    [] adult 3 in 1 TPN   Intravenous Continuous TPN Pranay Mcihel MD 50 mL/hr at 10/17/24 2233 New Bag at 10/17/24 2233    [COMPLETED] dilTIAZem (cardIZEM) 25 mg/5mL injection 10 mg  10 mg Intravenous Once Pranay Michel MD   10 mg at 10/17/24 1330    [COMPLETED] sodium chloride 0.9 % IV bolus 250 mL  250 mL Intravenous Once Pranay Michel  mL/hr at 10/17/24 1700 250 mL at 10/17/24 1700    [COMPLETED] amiodarone (Cordarone) 150 mg in dextrose 5% 100 mL IV bolus  150 mg Intravenous Once Emerson Julio  mL/hr at 10/17/24 1805 150 mg at 10/17/24 1805    [COMPLETED] digoxin (Lanoxin) 250 MCG/ML injection 250 mcg  250 mcg Intravenous Once Herman Phelan MD   250 mcg at 10/17/24 1750    [] sodium chloride 0.9 % IV bolus 100 mL  100 mL Intravenous Q30 Min PRN José Callahan MD        And    [] albumin human (Albumin) 25% injection 25 g  25 g Intravenous PRN Dialysis José Callahan MD        [] dextrose 5%-sodium chloride 0.45% infusion   Intravenous Continuous Lorelei Mata MD 50 mL/hr at 10/16/24 0038 New Bag at 10/16/24 003     [] adult 3 in 1 TPN   Intravenous Continuous TPN Pranay Michel MD 50 mL/hr at 10/16/24 2118 New Bag at 10/16/24 2118    [COMPLETED] metoprolol (Lopressor) 5 mg/5mL injection 5 mg  5 mg Intravenous Once Herman Phelan MD   5 mg at 10/16/24 2307    [] sodium chloride 0.9 % IV bolus 100 mL  100 mL Intravenous Q30 Min PRN José Callahan MD        And    [] albumin human (Albumin) 25% injection 25 g  25 g Intravenous PRN Dialysis José Callahan MD        [] sodium chloride 0.9 % IV bolus 100 mL  100 mL Intravenous Q30 Min PRN Walker Klein MD        And    [] albumin human (Albumin) 25% injection 25 g  25 g Intravenous PRN Dialysis Walker Klein MD        [COMPLETED] metoprolol (Lopressor) 5 mg/5mL injection 5 mg  5 mg Intravenous Once Radha Dunham MD   5 mg at 10/12/24 0304    [COMPLETED] methylPREDNISolone sodium succinate (Solu-MEDROL) injection 40 mg  40 mg Intravenous BID Jayjay Mijares MD   40 mg at 10/13/24 0919    [] sodium chloride 0.9 % IV bolus 100 mL  100 mL Intravenous Q30 Min PRN Walker Klein MD        And    [] albumin human (Albumin) 25% injection 25 g  25 g Intravenous PRN Dialysis Walker Klein MD   25 g at 10/11/24 1900    [COMPLETED] methylPREDNISolone sodium succinate (Solu-MEDROL) injection 60 mg  60 mg Intravenous Once Jimmie Landeros MD   60 mg at 10/09/24 0148    [COMPLETED] EPINEPHrine-racemic (S-2) 2.25 % nebulizer solution 0.5 mL  0.5 mL Nebulization Once Jimmie Landeros MD   0.5 mL at 10/09/24 0240    [] sodium chloride 0.9 % IV bolus 100 mL  100 mL Intravenous Q30 Min PRN Moni Pugh MD        And    [] albumin human (Albumin) 25% injection 25 g  25 g Intravenous PRN Dialysis Moni Pugh MD        [COMPLETED] methylPREDNISolone sodium succinate (Solu-MEDROL) injection 125 mg  125 mg Intravenous Once Gordon Borden MD   125 mg at 10/06/24 1823    [COMPLETED] EPINEPHrine-racemic (S-2)  2.25 % nebulizer solution 0.5 mL  0.5 mL Nebulization Once Ollie Santos MD   0.5 mL at 10/06/24 1812    [] methylPREDNISolone sodium succinate (Solu-MEDROL) 125 MG injection             [COMPLETED] potassium chloride (Klor-Con) 20 MEQ oral powder 20 mEq  20 mEq Oral Once Moni Pugh MD   20 mEq at 10/05/24 0556    [COMPLETED] fluconazole (Diflucan) tab 200 mg  200 mg Oral Once Lizbeth Rey MD   200 mg at 10/05/24 1610    [COMPLETED] potassium chloride (Klor-Con) 20 MEQ oral powder 10 mEq  10 mEq Oral Once Moni Pugh MD   10 mEq at 10/04/24 0736    [] ondansetron (Zofran) 4 MG/2ML injection 4 mg  4 mg Intravenous Once PRN Talha Gregg MD        [] prochlorperazine (Compazine) 10 MG/2ML injection 5 mg  5 mg Intravenous Once PRN Talha Gregg MD        [] haloperidol lactate (Haldol) 5 MG/ML injection 0.25 mg  0.25 mg Intravenous Once PRN Talha Gregg MD        [] fentaNYL (Sublimaze) 50 mcg/mL injection 25 mcg  25 mcg Intravenous Q5 Min PRN Talha Gregg MD   25 mcg at 10/04/24 1411    [] fentaNYL (Sublimaze) 50 mcg/mL injection 50 mcg  50 mcg Intravenous Q5 Min PRN Talha Gregg MD        [] HYDROmorphone (Dilaudid) 1 MG/ML injection 0.2 mg  0.2 mg Intravenous Q15 Min PRN Talha Gregg MD        [] HYDROmorphone (Dilaudid) 1 MG/ML injection 0.2 mg  0.2 mg Intravenous Q15 Min PRN Talha Gregg MD        [] HYDROmorphone (Dilaudid) 1 MG/ML injection 0.2 mg  0.2 mg Intravenous Q15 Min PRN Talha Gregg MD        [] atropine 0.1 MG/ML injection 0.5 mg  0.5 mg Intravenous PRN Talha Gregg MD        [] naloxone (Narcan) 0.4 MG/ML injection 0.08 mg  0.08 mg Intravenous PRN Talha Gregg MD        [] sodium chloride 0.9 % IV bolus 500 mL  500 mL Intravenous Once PRN Humberto Murphy MD        [] diphenhydrAMINE (Benadryl) 50 mg/mL  injection 25 mg  25 mg Intravenous Once PRN Humberto Murphy,  MD        [COMPLETED] ceFAZolin (Ancef) 1 g in dextrose 5% 100mL IVPB-ADD  1 g Intravenous Q24H Humberto Murphy  mL/hr at 10/05/24 0833 1 g at 10/05/24 0833    [COMPLETED] clonidine-EPINEPHrine-ropivacaine-ketorolac (CERTS) (Duraclon-Adrenalin-Naropin-Toradol) pain cocktail irrigation   Intra-articular Once (Intra-Op) Humberto Murphy MD   Given at 10/04/24 1211    [] sodium chloride 0.9 % IV bolus 100 mL  100 mL Intravenous Q30 Min PRN Humberto Murphy MD        And    [] albumin human (Albumin) 25% injection 25 g  25 g Intravenous PRN Dialysis Humberto Murphy MD        [COMPLETED] tranexamic acid in sodium chloride 0.7% (Cyklokapron) 1000 mg/100mL infusion premix 1,000 mg  1,000 mg Intravenous 30 Min Pre-Op Humberto Murphy MD   1,000 mg at 10/04/24 1149    [COMPLETED] potassium chloride (Klor-Con) 20 MEQ oral powder 20 mEq  20 mEq Oral Once Mirta Redman MD   20 mEq at 10/02/24 202    [COMPLETED] sodium chloride 0.9 % IV bolus 250 mL  250 mL Intravenous Once Nora Jones APRN   Stopped at 10/01/24 1150    [] sodium chloride 0.9 % IV bolus 100 mL  100 mL Intravenous Q30 Min PRN Moni Pugh MD        And    [] albumin human (Albumin) 25% injection 25 g  25 g Intravenous PRN Dialysis Moni Pugh MD        [] piperacillin-tazobactam (Zosyn) 3.375 g in dextrose 5% 100 mL IVPB-ADDV  3.375 g Intravenous Q12H Jimmie Landeros MD 25 mL/hr at 10/04/24 1900 3.375 g at 10/04/24 1900    [COMPLETED] iopamidol 76% (ISOVUE-370) injection for power injector  80 mL Intravenous ONCE PRN Radha Gabriel MD   80 mL at 24 1126    [COMPLETED] furosemide (Lasix) 10 mg/mL injection 40 mg  40 mg Intravenous Once Nick Moreau MD   40 mg at 24 0607    [] furosemide (Lasix) 10 mg/mL injection             [COMPLETED] hydrALAzine (Apresoline) 20 mg/mL injection 10 mg  10 mg Intravenous Once Nick Moreau MD   10 mg at 24 0626    [] etomidate (Amidate)  2 mg/mL injection             [COMPLETED] propofol (Diprivan) 10 MG/ML injection        1,000 mg at 24 1436    [COMPLETED] piperacillin-tazobactam (Zosyn) 4.5 g in dextrose 5% 100 mL IVPB-ADDV  4.5 g Intravenous Once Nick Moreau  mL/hr at 24 0853 4.5 g at 24 0853    [] sodium chloride 0.9 % IV bolus 100 mL  100 mL Intravenous Q30 Min PRN José Callahan MD        [COMPLETED] nitroGLYCERIN (Nitrobid) 2 % ointment 1 inch  1 inch Topical Once Nick Moreau MD   1 inch at 24 0654    [COMPLETED] hydrALAzine (Apresoline) 20 mg/mL injection 10 mg  10 mg Intravenous Once Nick Moreau MD   10 mg at 24 0654    [COMPLETED] ondansetron (Zofran) 4 MG/2ML injection        4 mg at 24 1241    [COMPLETED] metoclopramide (Reglan) 5 mg/mL injection 5 mg  5 mg Intravenous Once Karla Irvin APRN   5 mg at 24 1300    [COMPLETED] norepinephrine (Levophed) 4 mg/250mL infusion premix        4,000 mcg at 24 1455    [COMPLETED] succinylcholine (Anectine) 20 MG/ML injection 70.6 mg  1 mg/kg Intravenous Once Atul Sheridan MD   70.6 mg at 24 1445    [COMPLETED] HYDROcodone-acetaminophen (Norco) 5-325 MG per tab 1 tablet  1 tablet Oral Once Vitor Kline MD   1 tablet at 24 1245    [COMPLETED] morphINE PF 4 MG/ML injection 4 mg  4 mg Intravenous Once Vitor Kline MD   4 mg at 24 1448    [COMPLETED] sodium chloride 0.9 % IV bolus 1,000 mL  1,000 mL Intravenous Once Vitor Kline MD 1,000 mL/hr at 24 1447 1,000 mL at 24 1447    [COMPLETED] ondansetron (Zofran) 4 MG/2ML injection 4 mg  4 mg Intravenous Once Vitor Kline MD   4 mg at 24 1448    [COMPLETED] ceFAZolin (Ancef) 2g in 10mL IV syringe premix  2 g Intravenous 30 Min Pre-Op Humberto Murphy MD   2 g at 10/04/24 0915    [COMPLETED] heparin (Porcine) 5000 UNIT/ML injection 5,000 Units  5,000 Units Subcutaneous Once Humberto Murphy MD   5,000 Units at 24  1758    [COMPLETED] dilTIAZem (cardIZEM) 25 mg/5mL injection 20 mg  20 mg Intravenous Once Vitor Kline MD   20 mg at 24 2750     Current Outpatient Medications on File Prior to Encounter   Medication Sig Dispense Refill    acetaminophen 500 MG Oral Tab 1 tablet (500 mg total) by Per G Tube route in the morning and 1 tablet (500 mg total) before bedtime.      Gabapentin 300 MG/6ML Oral Solution 300 mg by Peg Tube route in the morning and 300 mg before bedtime. 450 mL 3    linaGLIPtin (TRADJENTA) 5 mg Oral Tab 1 tablet (5 mg total) by Per G Tube route daily. 90 tablet 3    atorvastatin 40 MG Oral Tab 1 tablet (40 mg total) by Per G Tube route nightly. 90 tablet 3    amiodarone 200 MG Oral Tab 1 tablet (200 mg total) by Per G Tube route daily. 90 tablet 3    carvedilol 25 MG Oral Tab 1 tablet (25 mg total) by Per G Tube route 2 (two) times daily. 180 tablet 3    B Complex-C-Folic Acid (RENAL MULTIVITAMIN FORMULA) Oral Tab 1 tablet by Per G Tube route daily. 90 tablet 3    clopidogrel 75 MG Oral Tab Take 1 tablet (75 mg total) by mouth daily. 90 tablet 3    albuterol (PROAIR HFA) 108 (90 Base) MCG/ACT Inhalation Aero Soln Inhale 2 puffs into the lungs every 4 (four) hours as needed for Wheezing. 3 each 3    fluticasone propionate 50 MCG/ACT Nasal Suspension 2 sprays by Nasal route daily. 48 g 3    [] amoxicillin clavulanate 250-125 MG Oral Tab 1 tablet (250 mg total) by Per G Tube route daily. Taking for swelling to left side of jaw      lansoprazole 30 MG Oral Tablet Delayed Release Dispersible 1 tablet (30 mg total) by Per G Tube route every morning before breakfast. 30 tablet 0    aspirin 81 MG Oral Tab EC Take 1 tablet (81 mg total) by mouth daily. (Patient taking differently: Take 1 tablet (81 mg total) by mouth daily. Per G-tube) 90 tablet 3    cetirizine 10 MG Oral Tab Take 1 tablet (10 mg total) by mouth every other day.      polyethylene glycol, PEG 3350, 17 g Oral Powd Pack 17 g by Per G Tube  route daily as needed (Constipation).      Insulin Lispro, 1 Unit Dial, (HUMALOG KWIKPEN) 100 UNIT/ML Subcutaneous Solution Pen-injector Take subcutaneously 4 times daily before tube feedings per sliding scale. Max total dose of 20 units. (Patient not taking: Reported on 2024) 6 mL 1    [] glucagon (GVOKE HYPOPEN 2-PACK) 1 MG/0.2ML Subcutaneous injection Inject 0.2 mL (1 mg total) into the skin once as needed for Low blood glucose. 1 each 0    Insulin Pen Needle 33G X 4 MM Does not apply Misc 1 each 4 (four) times daily. 200 each 1    albuterol (2.5 MG/3ML) 0.083% Inhalation Nebu Soln Take 3 mL (2.5 mg total) by nebulization every 4 (four) hours as needed for Wheezing or Shortness of Breath. (Patient not taking: Reported on 2024) 360 mL 3    HYDROcodone-acetaminophen 5-325 MG Oral Tab 1 tablet by Per G Tube route every 8 (eight) hours as needed. (Patient not taking: Reported on 2024) 90 tablet 0    Incontinence Supply Disposable (COMFORT PROTECT ADULT DIAPER/L) Does not apply Misc 1 Application daily. 30 each 3    Electronic Thermometer ([employer] DIGITAL THERMOMETER TEMPLE) Does not apply Misc Check temperature 1 each 0    insulin glargine (LANTUS SOLOSTAR) 100 UNIT/ML Subcutaneous Solution Pen-injector Inject 8 Units into the skin every morning. (Patient not taking: Reported on 2024) 15 mL 0    Insulin Pen Needle 32G X 4 MM Does not apply Misc Take as directed 200 each 3    Budesonide-Formoterol Fumarate (SYMBICORT) 160-4.5 MCG/ACT Inhalation Aerosol Inhale 2 puffs into the lungs 2 (two) times daily. (Patient not taking: Reported on 2024) 3 each 3    Starch-Maltodextrin (THICK-IT) Oral Powd Pack Use as directed (Patient not taking: Reported on 2024) 200 each 3    albuterol (2.5 MG/3ML) 0.083% Inhalation Nebu Soln Take 3 mL (2.5 mg total) by nebulization in the morning and 3 mL (2.5 mg total) before bedtime. (Patient not taking: Reported on 12/3/2024)      lidocaine 4 % External Patch  Place 1 patch onto the skin daily. (Patient not taking: Reported on 12/3/2024)      acetaminophen 500 MG Oral Tab Take 1 tablet (500 mg total) by mouth every 6 (six) hours as needed for Pain. (Patient not taking: Reported on 12/6/2024)      Cholecalciferol 125 MCG (5000 UT) Oral Tab Take 1 tablet (5,000 Units total) by mouth 2 (two) times daily. (Patient not taking: Reported on 12/3/2024)

## 2024-12-10 NOTE — PLAN OF CARE
Safety precautions in place. Call light within reach.   Problem: Patient Centered Care  Goal: Patient preferences are identified and integrated in the patient's plan of care  Description: Interventions:  - What would you like us to know as we care for you? I am from home with my wife  - Provide timely, complete, and accurate information to patient/family  - Incorporate patient and family knowledge, values, beliefs, and cultural backgrounds into the planning and delivery of care  - Encourage patient/family to participate in care and decision-making at the level they choose  - Honor patient and family perspectives and choices  Outcome: Progressing     Problem: Diabetes/Glucose Control  Goal: Glucose maintained within prescribed range  Description: INTERVENTIONS:  - Monitor Blood Glucose as ordered  - Assess for signs and symptoms of hyperglycemia and hypoglycemia  - Administer ordered medications to maintain glucose within target range  - Assess barriers to adequate nutritional intake and initiate nutrition consult as needed  - Instruct patient on self management of diabetes  Outcome: Progressing     Problem: Patient/Family Goals  Goal: Patient/Family Long Term Goal  Description: Patient's Long Term Goal: Discharge    Interventions:  - Monitor vitals  - Monitor appropriate labs  - Administer medications as ordered  - Follow MD's orders  - Update patient on plan of care   - Discharge planning     - See additional Care Plan goals for specific interventions  Outcome: Progressing  Goal: Patient/Family Short Term Goal  Description: Patient's Short Term Goal:     Interventions:   -   - See additional Care Plan goals for specific interventions  Outcome: Progressing     Problem: MUSCULOSKELETAL - ADULT  Goal: Return mobility to safest level of function  Description: INTERVENTIONS:  - Assess patient stability and activity tolerance for standing, transferring and ambulating w/ or w/o assistive devices  - Assist with transfers  and ambulation using safe patient handling equipment as needed  - Ensure adequate protection for wounds/incisions during mobilization  - Obtain PT/OT consults as needed  - Advance activity as appropriate  - Communicate ordered activity level and limitations with patient/family  Outcome: Progressing     Problem: PAIN - ADULT  Goal: Verbalizes/displays adequate comfort level or patient's stated pain goal  Description: INTERVENTIONS:  - Encourage pt to monitor pain and request assistance  - Assess pain using appropriate pain scale  - Administer analgesics based on type and severity of pain and evaluate response  - Implement non-pharmacological measures as appropriate and evaluate response  - Consider cultural and social influences on pain and pain management  - Manage/alleviate anxiety  - Utilize distraction and/or relaxation techniques  - Monitor for opioid side effects  - Notify MD/LIP if interventions unsuccessful or patient reports new pain  - Anticipate increased pain with activity and pre-medicate as appropriate  Outcome: Progressing     Problem: RISK FOR INFECTION - ADULT  Goal: Absence of fever/infection during anticipated neutropenic period  Description: INTERVENTIONS  - Monitor WBC  - Administer growth factors as ordered  - Implement neutropenic guidelines  Outcome: Progressing     Problem: SAFETY ADULT - FALL  Goal: Free from fall injury  Description: INTERVENTIONS:  - Assess pt frequently for physical needs  - Identify cognitive and physical deficits and behaviors that affect risk of falls.  - [place] fall precautions as indicated by assessment.  - Educate pt/family on patient safety including physical limitations  - Instruct pt to call for assistance with activity based on assessment  - Modify environment to reduce risk of injury  - Provide assistive devices as appropriate  - Consider OT/PT consult to assist with strengthening/mobility  - Encourage toileting schedule  Outcome: Progressing     Problem:  DISCHARGE PLANNING  Goal: Discharge to home or other facility with appropriate resources  Description: INTERVENTIONS:  - Identify barriers to discharge w/pt and caregiver  - Include patient/family/discharge partner in discharge planning  - Arrange for needed discharge resources and transportation as appropriate  - Identify discharge learning needs (meds, wound care, etc)  - Arrange for interpreters to assist at discharge as needed  - Consider post-discharge preferences of patient/family/discharge partner  - Complete POLST form as appropriate  - Assess patient's ability to be responsible for managing their own health  - Refer to Case Management Department for coordinating discharge planning if the patient needs post-hospital services based on physician/LIP order or complex needs related to functional status, cognitive ability or social support system  Outcome: Progressing

## 2024-12-11 ENCOUNTER — APPOINTMENT (OUTPATIENT)
Dept: MRI IMAGING | Facility: HOSPITAL | Age: 79
End: 2024-12-11
Attending: HOSPITALIST
Payer: MEDICARE

## 2024-12-11 LAB
ANION GAP SERPL CALC-SCNC: 6 MMOL/L (ref 0–18)
BASOPHILS # BLD AUTO: 0.06 X10(3) UL (ref 0–0.2)
BASOPHILS NFR BLD AUTO: 0.5 %
BLOOD TYPE BARCODE: 6200
BUN BLD-MCNC: 35 MG/DL (ref 9–23)
BUN/CREAT SERPL: 10.4 (ref 10–20)
CALCIUM BLD-MCNC: 9.1 MG/DL (ref 8.7–10.4)
CHLORIDE SERPL-SCNC: 96 MMOL/L (ref 98–112)
CO2 SERPL-SCNC: 33 MMOL/L (ref 21–32)
CREAT BLD-MCNC: 3.35 MG/DL
DEPRECATED RDW RBC AUTO: 60.2 FL (ref 35.1–46.3)
EGFRCR SERPLBLD CKD-EPI 2021: 18 ML/MIN/1.73M2 (ref 60–?)
EOSINOPHIL # BLD AUTO: 0.38 X10(3) UL (ref 0–0.7)
EOSINOPHIL NFR BLD AUTO: 3.4 %
ERYTHROCYTE [DISTWIDTH] IN BLOOD BY AUTOMATED COUNT: 17.5 % (ref 11–15)
GLUCOSE BLD-MCNC: 156 MG/DL (ref 70–99)
GLUCOSE BLDC GLUCOMTR-MCNC: 182 MG/DL (ref 70–99)
GLUCOSE BLDC GLUCOMTR-MCNC: 210 MG/DL (ref 70–99)
GLUCOSE BLDC GLUCOMTR-MCNC: 231 MG/DL (ref 70–99)
GLUCOSE BLDC GLUCOMTR-MCNC: 241 MG/DL (ref 70–99)
GLUCOSE BLDC GLUCOMTR-MCNC: 242 MG/DL (ref 70–99)
HAPTOGLOB SERPL-MCNC: 274 MG/DL (ref 30–200)
HCT VFR BLD AUTO: 27 %
HGB BLD-MCNC: 8.7 G/DL
HGB RETIC QN AUTO: 25.5 PG (ref 28.2–36.6)
IMM GRANULOCYTES # BLD AUTO: 0.11 X10(3) UL (ref 0–1)
IMM GRANULOCYTES NFR BLD: 1 %
IMM RETICS NFR: 0.27 RATIO (ref 0.1–0.3)
LYMPHOCYTES # BLD AUTO: 1.45 X10(3) UL (ref 1–4)
LYMPHOCYTES NFR BLD AUTO: 13.1 %
MAGNESIUM SERPL-MCNC: 2.2 MG/DL (ref 1.6–2.6)
MCH RBC QN AUTO: 31.1 PG (ref 26–34)
MCHC RBC AUTO-ENTMCNC: 32.2 G/DL (ref 31–37)
MCV RBC AUTO: 96.4 FL
MONOCYTES # BLD AUTO: 0.94 X10(3) UL (ref 0.1–1)
MONOCYTES NFR BLD AUTO: 8.5 %
NEUTROPHILS # BLD AUTO: 8.12 X10 (3) UL (ref 1.5–7.7)
NEUTROPHILS # BLD AUTO: 8.12 X10(3) UL (ref 1.5–7.7)
NEUTROPHILS NFR BLD AUTO: 73.5 %
OSMOLALITY SERPL CALC.SUM OF ELEC: 291 MOSM/KG (ref 275–295)
PHOSPHATE SERPL-MCNC: 4 MG/DL (ref 2.4–5.1)
PLATELET # BLD AUTO: 245 10(3)UL (ref 150–450)
POTASSIUM SERPL-SCNC: 4.7 MMOL/L (ref 3.5–5.1)
RBC # BLD AUTO: 2.8 X10(6)UL
RETICS # AUTO: 93.8 X10(3) UL (ref 22.5–147.5)
RETICS/RBC NFR AUTO: 3.4 %
SODIUM SERPL-SCNC: 135 MMOL/L (ref 136–145)
UNIT VOLUME: 350 ML
WBC # BLD AUTO: 11.1 X10(3) UL (ref 4–11)

## 2024-12-11 PROCEDURE — 99232 SBSQ HOSP IP/OBS MODERATE 35: CPT | Performed by: INTERNAL MEDICINE

## 2024-12-11 PROCEDURE — 99232 SBSQ HOSP IP/OBS MODERATE 35: CPT | Performed by: STUDENT IN AN ORGANIZED HEALTH CARE EDUCATION/TRAINING PROGRAM

## 2024-12-11 PROCEDURE — 99233 SBSQ HOSP IP/OBS HIGH 50: CPT | Performed by: INTERNAL MEDICINE

## 2024-12-11 PROCEDURE — 72148 MRI LUMBAR SPINE W/O DYE: CPT | Performed by: HOSPITALIST

## 2024-12-11 NOTE — PLAN OF CARE
Safety precautions in place. Call light within reach.   Problem: Patient Centered Care  Goal: Patient preferences are identified and integrated in the patient's plan of care  Description: Interventions:  - What would you like us to know as we care for you? I am from home with my wife  - Provide timely, complete, and accurate information to patient/family  - Incorporate patient and family knowledge, values, beliefs, and cultural backgrounds into the planning and delivery of care  - Encourage patient/family to participate in care and decision-making at the level they choose  - Honor patient and family perspectives and choices  Outcome: Progressing     Problem: Diabetes/Glucose Control  Goal: Glucose maintained within prescribed range  Description: INTERVENTIONS:  - Monitor Blood Glucose as ordered  - Assess for signs and symptoms of hyperglycemia and hypoglycemia  - Administer ordered medications to maintain glucose within target range  - Assess barriers to adequate nutritional intake and initiate nutrition consult as needed  - Instruct patient on self management of diabetes  Outcome: Progressing     Problem: Patient/Family Goals  Goal: Patient/Family Long Term Goal  Description: Patient's Long Term Goal: Discharge    Interventions:  - Monitor vitals  - Monitor appropriate labs  - Administer medications as ordered  - Follow MD's orders  - Update patient on plan of care   - Discharge planning     - See additional Care Plan goals for specific interventions  Outcome: Progressing  Goal: Patient/Family Short Term Goal  Description: Patient's Short Term Goal:     Interventions:   -   - See additional Care Plan goals for specific interventions  Outcome: Progressing     Problem: MUSCULOSKELETAL - ADULT  Goal: Return mobility to safest level of function  Description: INTERVENTIONS:  - Assess patient stability and activity tolerance for standing, transferring and ambulating w/ or w/o assistive devices  - Assist with transfers  and ambulation using safe patient handling equipment as needed  - Ensure adequate protection for wounds/incisions during mobilization  - Obtain PT/OT consults as needed  - Advance activity as appropriate  - Communicate ordered activity level and limitations with patient/family  Outcome: Progressing     Problem: PAIN - ADULT  Goal: Verbalizes/displays adequate comfort level or patient's stated pain goal  Description: INTERVENTIONS:  - Encourage pt to monitor pain and request assistance  - Assess pain using appropriate pain scale  - Administer analgesics based on type and severity of pain and evaluate response  - Implement non-pharmacological measures as appropriate and evaluate response  - Consider cultural and social influences on pain and pain management  - Manage/alleviate anxiety  - Utilize distraction and/or relaxation techniques  - Monitor for opioid side effects  - Notify MD/LIP if interventions unsuccessful or patient reports new pain  - Anticipate increased pain with activity and pre-medicate as appropriate  Outcome: Progressing     Problem: RISK FOR INFECTION - ADULT  Goal: Absence of fever/infection during anticipated neutropenic period  Description: INTERVENTIONS  - Monitor WBC  - Administer growth factors as ordered  - Implement neutropenic guidelines  Outcome: Progressing     Problem: SAFETY ADULT - FALL  Goal: Free from fall injury  Description: INTERVENTIONS:  - Assess pt frequently for physical needs  - Identify cognitive and physical deficits and behaviors that affect risk of falls.  - [place] fall precautions as indicated by assessment.  - Educate pt/family on patient safety including physical limitations  - Instruct pt to call for assistance with activity based on assessment  - Modify environment to reduce risk of injury  - Provide assistive devices as appropriate  - Consider OT/PT consult to assist with strengthening/mobility  - Encourage toileting schedule  Outcome: Progressing     Problem:  DISCHARGE PLANNING  Goal: Discharge to home or other facility with appropriate resources  Description: INTERVENTIONS:  - Identify barriers to discharge w/pt and caregiver  - Include patient/family/discharge partner in discharge planning  - Arrange for needed discharge resources and transportation as appropriate  - Identify discharge learning needs (meds, wound care, etc)  - Arrange for interpreters to assist at discharge as needed  - Consider post-discharge preferences of patient/family/discharge partner  - Complete POLST form as appropriate  - Assess patient's ability to be responsible for managing their own health  - Refer to Case Management Department for coordinating discharge planning if the patient needs post-hospital services based on physician/LIP order or complex needs related to functional status, cognitive ability or social support system  Outcome: Progressing

## 2024-12-11 NOTE — OCCUPATIONAL THERAPY NOTE
OCCUPATIONAL THERAPY TREATMENT NOTE - INPATIENT        Room Number: 553/553-A     Presenting Problem: peripheral polyneuropathy    Problem List  Principal Problem:    Peripheral polyneuropathy  Active Problems:    ESRD on hemodialysis (HCC)    Hypophosphatemia      OCCUPATIONAL THERAPY ASSESSMENT   Patient demonstrates limited progress this session, goals remain in progress.    Patient is requiring  up to max assist x2  as a result of the following impairments: decreased functional strength, decreased endurance, impaired standing balance, impaired motor planning, decreased muscular endurance, and medical status.    Patient continues to function below baseline with toileting, bathing, lower body dressing, bed mobility, transfers, static standing balance, dynamic standing balance, and functional standing tolerance.  Next session anticipate patient to progress lower body dressing, bed mobility, transfers, static standing balance, dynamic standing balance, and functional standing tolerance.  Occupational Therapy will continue to follow patient for duration of hospitalization.    Patient continues to benefit from continued skilled OT services: to promote return to prior level of function and safety with continuous assistance and gradual rehabilitative therapy.     PLAN DURING HOSPITALIZATION  OT Device Recommendations: TBD  OT Treatment Plan: Balance activities;Energy conservation/work simplification techniques;ADL training;Functional transfer training;Endurance training;Patient/Family education;Patient/Family training;Compensatory technique education     SUBJECTIVE  I feel tired.     OBJECTIVE  Precautions: Limb alert - right;Bed/chair alarm       PAIN ASSESSMENT  Rating: Unable to rate  Location: generalized body aches  Management Techniques: Activity promotion; Relaxation; Repositioning      ACTIVITIES OF DAILY LIVING ASSESSMENT  AM-PAC ‘6-Clicks’ Inpatient Daily Activity Short Form  How much help from another person  does the patient currently need…  -   Putting on and taking off regular lower body clothing?: Total  -   Bathing (including washing, rinsing, drying)?: A Lot  -   Toileting, which includes using toilet, bedpan or urinal? : Total  -   Putting on and taking off regular upper body clothing?: A Little  -   Taking care of personal grooming such as brushing teeth?: A Little  -   Eating meals?: A Little    AM-PAC Score:  Score: 13  Approx Degree of Impairment: 63.03%  Standardized Score (AM-PAC Scale): 32.03  CMS Modifier (G-Code): CL    FUNCTIONAL TRANSFER ASSESSMENT  Sit to Stand: Edge of Bed  Edge of Bed: Initially Moderate Assist to max assist x2 due to posterior lean with feet sliding  Simulated toilet transfer: max assist x2 with RW     BED MOBILITY  Supine to Sit : Maximum Assist (x2)    BALANCE ASSESSMENT  Static Sitting: Stand-by Assist  Static Standing: Moderate Assist  Dynamic Standing: Max assist x2     FUNCTIONAL ADL ASSESSMENT  LB Dressing Seated: Maximum Assist       Skilled Therapy Provided:   RN cleared pt for participation. Session coordinated with PT. Pt received laying in bed with family present at bedside. Pt agreeable to participation in therapy, but presenting very lethargic throughout. Pt benefited from increased time and RW for support. Pt required max assist x2 to complete supine to sit mobility and initially mod assist for static sitting EOB. Pt able to progress to min-CGA with increased time. Pt demonstrating posterior lean throughout functional transfers and mobility tasks throughout session. Pt required mod assist x2 to complete sit to stand from bed and maintain static standing at EOB. Pt only able to tolerate ~10 seconds of stand before requiring seated rest break due to fatigue and posterior lean. Pt required max assist x2 to complete simulated toilet transfer to bedside chair using RW for support. Pt demonstrating significant posterior lean, with feet sliding forward during transfer. Pt  unable to self-correct with verbal and physical cues. Further ADL and functional mobility participation deferred this session due to pt's fatigue after transfer.       EDUCATION PROVIDED  Patient Education : Role of Occupational Therapy; Plan of Care; Functional Transfer Techniques; Fall Prevention; Posture/Positioning; Proper Body Mechanics  Patient's Response to Education: Verbalized Understanding  Family/Caregiver Education : Role of Occupational Therapy; Plan of Care  Family/Caregiver's Response to Education: Verbalized Understanding    The patient's Approx Degree of Impairment: 63.03% has been calculated based on documentation in the [facility] '6 clicks' Inpatient Daily Activity Short Form.  Research supports that patients with this level of impairment may benefit from rehab.  Final disposition will be made by interdisciplinary medical team.    Patient End of Session: Up in chair;Needs met;Call light within reach;RN aware of session/findings;All patient questions and concerns addressed;Hospital anti-slip socks;Family present    OT Goals:  Patients self stated goal is: unstated     Patient will complete functional transfer with min A  Comment: ongoing- max assist x2    Patient will complete toileting with min A  Comment: ongoing- max     Patient will tolerate standing for 3 minutes in prep for adls with min A   Comment: ongoing     Patient will complete item retrieval with min A  Comment: not tested           Goals  on: 24  Frequency: 3-5x/week  OT Session Time: 25 minutes  Self-Care Home Management: 25 minutes

## 2024-12-11 NOTE — PLAN OF CARE
Problem: MUSCULOSKELETAL - ADULT  Goal: Return mobility to safest level of function  Description: INTERVENTIONS:  - Assess patient stability and activity tolerance for standing, transferring and ambulating w/ or w/o assistive devices  - Assist with transfers and ambulation using safe patient handling equipment as needed  - Ensure adequate protection for wounds/incisions during mobilization  - Obtain PT/OT consults as needed  - Advance activity as appropriate  - Communicate ordered activity level and limitations with patient/family  Outcome: Progressing     Problem: PAIN - ADULT  Goal: Verbalizes/displays adequate comfort level or patient's stated pain goal  Description: INTERVENTIONS:  - Encourage pt to monitor pain and request assistance  - Assess pain using appropriate pain scale  - Administer analgesics based on type and severity of pain and evaluate response  - Implement non-pharmacological measures as appropriate and evaluate response  - Consider cultural and social influences on pain and pain management  - Manage/alleviate anxiety  - Utilize distraction and/or relaxation techniques  - Monitor for opioid side effects  - Notify MD/LIP if interventions unsuccessful or patient reports new pain  - Anticipate increased pain with activity and pre-medicate as appropriate  Outcome: Progressing     Problem: SAFETY ADULT - FALL  Goal: Free from fall injury  Description: INTERVENTIONS:  - Assess pt frequently for physical needs  - Identify cognitive and physical deficits and behaviors that affect risk of falls.  - [place] fall precautions as indicated by assessment.  - Educate pt/family on patient safety including physical limitations  - Instruct pt to call for assistance with activity based on assessment  - Modify environment to reduce risk of injury  - Provide assistive devices as appropriate  - Consider OT/PT consult to assist with strengthening/mobility  - Encourage toileting schedule  Outcome: Progressing      Problem: DISCHARGE PLANNING  Goal: Discharge to home or other facility with appropriate resources  Description: INTERVENTIONS:  - Identify barriers to discharge w/pt and caregiver  - Include patient/family/discharge partner in discharge planning  - Arrange for needed discharge resources and transportation as appropriate  - Identify discharge learning needs (meds, wound care, etc)  - Arrange for interpreters to assist at discharge as needed  - Consider post-discharge preferences of patient/family/discharge partner  - Complete POLST form as appropriate  - Assess patient's ability to be responsible for managing their own health  - Refer to Case Management Department for coordinating discharge planning if the patient needs post-hospital services based on physician/LIP order or complex needs related to functional status, cognitive ability or social support system  Outcome: Progressing     Problem: Diabetes/Glucose Control  Goal: Glucose maintained within prescribed range  Description: INTERVENTIONS:  - Monitor Blood Glucose as ordered  - Assess for signs and symptoms of hyperglycemia and hypoglycemia  - Administer ordered medications to maintain glucose within target range  - Assess barriers to adequate nutritional intake and initiate nutrition consult as needed  - Instruct patient on self management of diabetes  Outcome: Not Progressing

## 2024-12-11 NOTE — PAYOR COMM NOTE
--------------  CONTINUED STAY REVIEW    Payor: [employer]  Subscriber #:  [id number]  Authorization Number: [id number]    Admit date: 12/6/24  Admit time:  5:03 PM      12/10/24   Constitutional:  Negative for appetite change.   HENT:  Negative for dental problem.    Respiratory:  Negative for cough and choking.    Musculoskeletal:  Positive for joint swelling and joint pain.   Neurological:  Negative for light-headedness.   Psychiatric/Behavioral:  Negative for decreased concentration.         Sleepy      Blood pressure 113/43, pulse 66, temperature 98.3 °F (36.8 °C), temperature source Oral, resp. rate 20, height 5' 4\" (1.626 m), weight 146 lb 14.4 oz (66.6 kg), SpO2 94%.    Cardiovascular:      Rate and Rhythm: Normal rate.      Pulses: Normal pulses.   Pulmonary:      Breath sounds: Rhonchi present. No wheezing.   Abdominal:      General: Bowel sounds are normal.      Palpations: Abdomen is soft.   Skin:     General: Skin is warm and dry.      Capillary Refill: Capillary refill takes less than 2 seconds.   Neurological:      General: No focal deficit present.      Mental Status: He is alert.   Psychiatric:         Behavior: Behavior normal.      Lab Results   Component Value Date     WBC 10.1 12/10/2024     HGB 8.3 (L) 12/10/2024     HCT 21.5 (L) 12/10/2024     .0 12/10/2024     CREATSERUM 2.46 (H) 12/10/2024     BUN 22 12/10/2024      12/10/2024     K 3.5 12/10/2024     CL 98 12/10/2024     CO2 33.0 (H) 12/10/2024      (H) 12/10/2024     CA 9.0 12/10/2024     ALB 3.0 (L) 12/08/2024     ALKPHO 116 12/07/2024     BILT 0.3 12/07/2024     TP 5.7 12/08/2024     AST 32 12/07/2024     ALT 29 12/07/2024       Assessment & Plan:     Peripheral polyneuropathy; checked rpr ok; thyroid; electropheresis; b12 ok 7/24  -Patient p/w bilateral lower extremity pain  -Significantly worse over the past 3 to 4 days.  -No real improvement with gabapentin at home  -Difficulties with ambulation  because of pain  -Pain control as able; pt screaming in pain or asleep; pain clinic saw; await mri  poss steroid   -PT/OT  -Continue to monitor     ESRD on HD  -Normally receives dialysis on Monday Wednesday Friday.  Last session was on Wednesday.  -Nephrology consulted, further HD per nephrology.     Anemia of chronic renal disease and iron defy checked stool for blood, neg;  -Likely secondary to ESRD as above  -Hemoglobin noted to be 7.1  -Continue to monitor     Type 2 DM will be worsened  - Monitoring Blood glucose with POC checks  - SSI to cover hyperglycemia  - Hypoglycemia protocol  - Will monitor and adjust agents as needed.      -KRAIG  -Hypertension  -Chronic dCHF  -Pulm HTN     Afib has watchman dr romano put in in 9/2024; mri ok with watchman per dr coffey     Severe ls disk disease in 2023; per pain recheck mri; hope to inj         Cad with left circ stent 5/24 poss need steroid inj; will hold and notify cards dr gale                  NEPHROLOGY  Assessment and Plan:      79 year old male with past medical history of T2DM, HTN, HLD, ESRD on HD MWF via R AVF, CAD s/p PCI (5/2024), A.fib on Eliquis, HFpEF, KRAIG, BPH, history of recurrent gastrointestinal bleeding, who presented with bilateral lower extremity pain.      ESRD HD MWF:   Next HD tomorrow.      Bilateral lower extremity pain:   Work up neg so far.   On Gabapentin for neuropathy.   Pain control.      CAD/A.fib:   Cont Coreg, ASA/plavix.   Cont amiodarone.      Anemia:   Hb decreased to 6.8 today. For 1 unit pRBCs today.   Continue EPO 10,000 units 3 times per week.     Hypophosphatemia:   Phos 1 today. Potassium phos ordered. On Jevity tube feeds.      MEDICATIONS ADMINISTERED IN LAST 1 DAY:    amiodarone (Pacerone) tab 200 mg       Date Action Dose Route User    12/10/2024 0903 Given 200 mg Per G Tube Janice Sheth, RN          aspirin  tab 81 mg       Date Action Dose Route User    12/10/2024 0902 Given 81 mg Oral Janice Sheth,  ROSALINO          atorvastatin (Lipitor) tab 40 mg       Date Action Dose Route User    12/10/2024 2103 Given 40 mg Per G Tube Lilo Rivera RN          carvedilol (Coreg) tab 25 mg       Date Action Dose Route User    12/10/2024 2103 Given 25 mg Per G Tube Lilo Rivera RN    12/10/2024 0903 Given 25 mg Per G Tube Janice Sheth RN          clopidogrel (Plavix) tab 75 mg       Date Action Dose Route User    12/10/2024 0903 Given 75 mg Oral Janice Sheth RN          furosemide (Lasix) 10 mg/mL injection 20 mg       Date Action Dose Route User    12/10/2024 1617 Given 20 mg Intravenous Janice Sheth RN          gabapentin (Neurontin) 250 MG/5ML oral solution 100 mg       Date Action Dose Route User    12/10/2024 2211 Given 100 mg Per G Tube Lilo Rivera RN    12/10/2024 1016 Given 100 mg Per G Tube Janice Sheth RN          heparin (Porcine) 5000 UNIT/ML injection 5,000 Units       Date Action Dose Route User    12/11/2024 0611 Given 5,000 Units Subcutaneous (Left Lower Abdomen) Lilo Rivera RN    12/10/2024 2103 Given 5,000 Units Subcutaneous (Left Upper Arm) Lilo Rivera RN          HYDROcodone-acetaminophen (Norco) 5-325 MG per tab 1 tablet       Date Action Dose Route User    12/11/2024 0338 Given 1 tablet Oral Lilo Rivera RN    12/10/2024 2103 Given 1 tablet Oral Lilo Rivera RN    12/10/2024 1314 Given 1 tablet Oral Janice Sheth RN          insulin aspart (NovoLOG) 100 Units/mL FlexPen 1-7 Units       Date Action Dose Route User    12/10/2024 2103 Given 2 Units Subcutaneous (Left Upper Arm) Lilo Rivera RN    12/10/2024 1759 Given 3 Units Subcutaneous (Left Upper Arm) Janice Sheth RN    12/10/2024 1214 Given 4 Units Subcutaneous (Left Upper Arm) Janice Sheth RN    12/10/2024 0922 Given 4 Units Subcutaneous (Left Upper Arm) Janice Sheth RN          insulin degludec (Tresiba) 100 units/mL flextouch 5 Units       Date Action Dose Route User    12/10/2024 2103 Given 5 Units Subcutaneous  (Left Upper Arm) Lilo Rivera RN          lansoprazole (Prevacid Solutab) disintegrating tab 30 mg       Date Action Dose Route User    12/11/2024 0611 Given 30 mg Per G Tube Lilo Rivera RN          lidocaine-menthol 4-1 % patch 2 patch       Date Action Dose Route User    12/10/2024 0904 Patch Applied 1 patch Transdermal (Right Leg) Janice Sheth RN          nortriptyline (Pamelor) cap 10 mg       Date Action Dose Route User    12/10/2024 2103 Given 10 mg Oral Lilo Rivera RN          polyethylene glycol (PEG 3350) (Miralax) 17 g oral packet 17 g       Date Action Dose Route User    12/10/2024 2211 Given 17 g Oral Lilo Rivera RN          potassium phosphate dibasic 30 mmol in sodium chloride 0.9% 250 mL IVPB       Date Action Dose Route User    12/10/2024 1754 New Bag 30 mmol Intravenous Janice Sheth RN          sodium chloride 0.9% infusion       Date Action Dose Route User    12/10/2024 0900 New Bag (none) Intravenous Janice Sheth RN          sodium ferric gluconate (Ferrlecit) 125 mg in sodium chloride 0.9% 100mL IVPB premix       Date Action Dose Route User    12/10/2024 1617 New Bag 125 mg Intravenous Janice Sheth RN          sodium phosphate 15 mmol in 0.9% NaCl 100mL IVPB premix       Date Action Dose Route User    12/11/2024 0007 Given 15 mmol Intravenous Lilo Rivera RN            Vitals (last day)       Date/Time Temp Pulse Resp BP SpO2 Weight O2 Device O2 Flow Rate (L/min) [facility]    12/11/24 0626 -- 69 -- -- 98 % -- Nasal cannula 2 L/min     12/11/24 0624 -- 69 -- -- 89 % -- None (Room air) --     12/11/24 0419 -- 71 -- -- 99 % -- Nasal cannula 1 L/min     12/11/24 0415 -- 71 -- -- 99 % -- Nasal cannula 2 L/min     12/11/24 0410 -- 72 -- -- 100 % -- Nasal cannula 3 L/min     12/11/24 0405 -- 79 -- -- 97 % -- Nasal cannula 4 L/min CN    12/11/24 0404 99.2 °F (37.3 °C) 72 22 136/49 88 % -- None (Room air) -- CN    12/10/24 2104 98.8 °F (37.1 °C) -- 22 137/55 92 % -- None (Room  air) -- CN    12/10/24 0852 97.9 °F (36.6 °C) -- 20 121/47 94 % -- None (Room air) -- JR    12/10/24 0310 98.5 °F (36.9 °C) -- 20 105/42 92 % -- None (Room air) -- CN

## 2024-12-11 NOTE — CM/SW NOTE
Prior Authorization - Destination  Destination Type: Skilled nursing facility  Service Provider: [facility]  Payer Communication Destination Comments: [place] [id number]  Prior Authorization Status: Submitted/Pending    Maria Isabel John DSC

## 2024-12-11 NOTE — PROGRESS NOTES
[facility]  part of [facility]    Nephrology Progress Note    Chief Complaint   Patient presents with    Foot Pain       Subjective:   79 year old male, ESRD on HD.     Complains of pain in legs.   HD today.     Review of Systems:   Review of Systems   Constitutional:  Negative for chills, fatigue and fever.   Respiratory:  Negative for cough and shortness of breath.    Cardiovascular:  Negative for chest pain and leg swelling.   Gastrointestinal:  Negative for abdominal pain, constipation, diarrhea, nausea and vomiting.   Genitourinary:  Negative for difficulty urinating, dysuria and flank pain.   Musculoskeletal:  Positive for arthralgias.       Objective:   Temp:  [97.6 °F (36.4 °C)-99.2 °F (37.3 °C)] 99.2 °F (37.3 °C)  Pulse:  [69-79] 69  Resp:  [20-22] 22  BP: (102-137)/(37-55) 136/49  SpO2:  [88 %-100 %] 98 %  SpO2: 98 %     Intake/Output Summary (Last 24 hours) at 12/11/2024 0831  Last data filed at 12/11/2024 0626  Gross per 24 hour   Intake 3683.8 ml   Output --   Net 3683.8 ml     Wt Readings from Last 3 Encounters:   12/10/24 146 lb 14.4 oz (66.6 kg)   11/30/24 145 lb (65.8 kg)   10/22/24 132 lb 14.4 oz (60.3 kg)     Physical Exam  Constitutional:       Appearance: Normal appearance.   Cardiovascular:      Rate and Rhythm: Normal rate and regular rhythm.      Heart sounds: Normal heart sounds.      Comments: R upper arm AVF-pos bruit/thrill  Pulmonary:      Effort: Pulmonary effort is normal.      Breath sounds: Normal breath sounds.   Musculoskeletal:         General: Normal range of motion.   Skin:     General: Skin is warm and dry.   Neurological:      General: No focal deficit present.      Mental Status: He is alert and oriented to person, place, and time.   Psychiatric:         Mood and Affect: Mood normal.         Behavior: Behavior normal.         Medications:  Current Facility-Administered Medications   Medication Dose Route Frequency    lidocaine-menthol 4-1 % patch 2 patch   2 patch Transdermal Daily    LORazepam (Ativan) tab 0.5 mg  0.5 mg Oral Q6H PRN    sodium ferric gluconate (Ferrlecit) 125 mg in sodium chloride 0.9% 100mL IVPB premix  125 mg Intravenous Daily    heparin (Porcine) 1000 UNIT/ML injection 1,500 Units  1.5 mL Intravenous PRN Dialysis    lidocaine-prilocaine (Emla) 2.5-2.5 % cream   Topical PRN    dextrose 10% infusion (TPN no rate)   Intravenous Continuous PRN    pancrelipase (Lip-Prot-Amyl) (Zenpep) DR particles cap 10,000 Units  10,000 Units Per G Tube PRN    And    sodium bicarbonate tab 325 mg  325 mg Oral PRN    metoprolol (Lopressor) 5 mg/5mL injection 5 mg  5 mg Intravenous PRN    Or    metoprolol (Lopressor) 5 mg/5mL injection 2.5 mg  2.5 mg Intravenous PRN    heparin (Porcine) 1000 UNIT/ML injection 1,500 Units  1.5 mL Intravenous PRN Dialysis    lidocaine-prilocaine (Emla) 2.5-2.5 % cream   Topical PRN    epoetin donna (Epogen, Procrit) 77635 UNIT/ML injection 10,000 Units  10,000 Units Subcutaneous Once per day on Monday Wednesday Friday    gabapentin (Neurontin) 250 MG/5ML oral solution 100 mg  100 mg Per G Tube BID    nortriptyline (Pamelor) cap 10 mg  10 mg Oral Nightly    HYDROmorphone (Dilaudid) 1 MG/ML injection 0.1 mg  0.1 mg Intravenous Q2H PRN    Or    HYDROmorphone (Dilaudid) 1 MG/ML injection 0.2 mg  0.2 mg Intravenous Q2H PRN    Or    HYDROmorphone (Dilaudid) 1 MG/ML injection 0.4 mg  0.4 mg Intravenous Q2H PRN    amiodarone (Pacerone) tab 200 mg  200 mg Per G Tube Daily    aspirin DR tab 81 mg  81 mg Oral Daily    atorvastatin (Lipitor) tab 40 mg  40 mg Per G Tube Nightly    carvedilol (Coreg) tab 25 mg  25 mg Per G Tube BID    [Held by provider] clopidogrel (Plavix) tab 75 mg  75 mg Oral Daily    fluticasone propionate (Flonase) 50 MCG/ACT nasal suspension 2 spray  2 spray Nasal Daily    insulin degludec (Tresiba) 100 units/mL flextouch 5 Units  5 Units Subcutaneous Nightly    lansoprazole (Prevacid Solutab) disintegrating tab 30 mg  30 mg Per  G Tube QAM AC    glucose (Dex4) 15 GM/59ML oral liquid 15 g  15 g Oral Q15 Min PRN    Or    glucose (Glutose) 40% oral gel 15 g  15 g Oral Q15 Min PRN    Or    glucose-vitamin C (Dex-4) chewable tab 4 tablet  4 tablet Oral Q15 Min PRN    Or    dextrose 50% injection 50 mL  50 mL Intravenous Q15 Min PRN    Or    glucose (Dex4) 15 GM/59ML oral liquid 30 g  30 g Oral Q15 Min PRN    Or    glucose (Glutose) 40% oral gel 30 g  30 g Oral Q15 Min PRN    Or    glucose-vitamin C (Dex-4) chewable tab 8 tablet  8 tablet Oral Q15 Min PRN    heparin (Porcine) 5000 UNIT/ML injection 5,000 Units  5,000 Units Subcutaneous Q8H STEPHANIE    acetaminophen (Tylenol Extra Strength) tab 500 mg  500 mg Oral Q4H PRN    acetaminophen (Tylenol) tab 650 mg  650 mg Oral Q4H PRN    Or    HYDROcodone-acetaminophen (Norco) 5-325 MG per tab 1 tablet  1 tablet Oral Q4H PRN    polyethylene glycol (PEG 3350) (Miralax) 17 g oral packet 17 g  17 g Oral Daily PRN    sennosides (Senokot) tab 17.2 mg  17.2 mg Oral Nightly PRN    bisacodyl (Dulcolax) 10 MG rectal suppository 10 mg  10 mg Rectal Daily PRN    insulin aspart (NovoLOG) 100 Units/mL FlexPen 1-7 Units  1-7 Units Subcutaneous TID CC and HS              Results:     Recent Labs   Lab 12/09/24  0546 12/10/24  0534 12/10/24  1709 12/11/24  0653   RBC 2.35* 2.22*  --  2.80*   HGB 7.1* 6.8* 8.3* 8.7*   HCT 22.4* 21.5*  --  27.0*   MCV 95.3 96.8  --  96.4   NEPRELIM 7.43 7.54  --  8.12*   WBC 10.4 10.1  --  11.1*   .0 244.0  --  245.0     Recent Labs   Lab 12/06/24  1359 12/07/24  0547 12/08/24  0158 12/08/24  0549 12/09/24  0546 12/10/24  0534 12/11/24  0653   * 129*  --  184* 114* 205* 156*   BUN 37* 37*  --  54* 27* 22 35*   CREATSERUM 4.04* 4.43*  --  5.29* 3.09* 2.46* 3.35*   CA 9.5 9.6  --  9.2 9.3 9.0 9.1   ALB 3.2 3.3 2.25* 3.0*  --   --   --    * 133*  --  133* 137 136 135*   K 4.0 3.9  --  3.8 3.4* 3.5 4.7   CL 92* 95*  --  94* 99 98 96*   CO2 35.0* 32.0  --  32.0 36.0* 33.0*  33.0*   ALKPHO 123* 116  --   --   --   --   --    AST 42* 32  --   --   --   --   --    ALT 38 29  --   --   --   --   --    BILT 0.2 0.3  --   --   --   --   --    TP 6.9 6.7 5.7  --   --   --   --      PTT   Date Value Ref Range Status   08/09/2024 30.1 23.0 - 36.0 seconds Final     INR   Date Value Ref Range Status   08/09/2024 1.45 (H) 0.80 - 1.20 Final     Comment:     Therapeutic INR range for patients on warfarin:  2.0-3.0 for most medical conditions and surgical prophylaxis   2.5-3.5 for mechanical heart valves and recurrent thromboembolism    Direct thrombin inhibitors (e.g. argatroban, bivalirudin), factor Xa inhibitors (e.g. apixaban, rivaroxaban), and conditions such as coagulation factor deficiency, vitamin K deficiency, and liver disease will prolong the prothrombin time/INR.    Unfractionated heparin and low molecular weight heparin do not affect the prothrombin time/INR up to a concentration of 1 IU/mL and 1.5 IU/mL, respectively.         No results for input(s): \"BNP\" in the last 168 hours.  Recent Labs   Lab 12/08/24  0549 12/09/24  0546 12/10/24  0534 12/11/24  0653   MG 2.4 2.3  --  2.2   PHOS 2.1* 1.6* 1.0* 4.0        Recent Labs   Lab 12/07/24  0547 12/08/24  0158 12/08/24  0549 12/08/24  0549 12/09/24  0546 12/10/24  0534 12/11/24  0653   PHOS  --   --  2.1*   < > 1.6* 1.0* 4.0   ALB 3.3 2.25* 3.0*  --   --   --   --     < > = values in this interval not displayed.         Assessment and Plan:     79 year old male with past medical history of T2DM, HTN, HLD, ESRD on HD MWF via R AVF, CAD s/p PCI (5/2024), A.fib on Eliquis, HFpEF, KRAIG, BPH, history of recurrent gastrointestinal bleeding, who presented with bilateral lower extremity pain.      ESRD HD MWF:   HD today.      Bilateral lower extremity pain:   Work up neg so far.   On Gabapentin for neuropathy.   Pain control.      CAD/A.fib:   Cont Coreg, ASA/plavix.   Cont amiodarone.      Anemia:   Hb 8.7 today, s/p 1 unit pRBCs 12/10.    Continue EPO 10,000 units 3 times per week.    Hypophosphatemia:   Phos improved to 4 today.       Mirta Redman MD  12/11/2024

## 2024-12-11 NOTE — CM/SW NOTE
SW received MDO for HH services. Patient's current discharge plan is for patient to DC to [facility], pending auth. Patient is current with [facility] for HH services at this time.    101pm- NOEMY requested for DSC to start auth to [facility]. PT OT saw patient today.     Plan: Pending medical clearance, DC to [facility], *auth, *HD approval    SW/CM to remain available for support and/or discharge planning.     Annette Shannon, MSW, LSW   x [phone]

## 2024-12-11 NOTE — PROGRESS NOTES
[facility]  part of [facility]    Progress Note    Ollie Hernández Patient Status:  Inpatient    1945 MRN [id number]   Location [facility] 5SW/SE Attending Dee Joyce,*   Hosp Day # 5 PCP Wili Parmar MD     Chief Complaint: foot pain    Subjective:     Pt in HD  Still having pain-  MRI results pending    Objective:   Blood pressure 132/52, pulse 69, temperature 98.3 °F (36.8 °C), temperature source Oral, resp. rate 20, height 5' 4\" (1.626 m), weight 146 lb 14.4 oz (66.6 kg), SpO2 98%.  Physical Exam  Vitals and nursing note reviewed.   Constitutional:       General: He is not in acute distress.     Appearance: He is ill-appearing. He is not toxic-appearing or diaphoretic.   Cardiovascular:      Rate and Rhythm: Normal rate.      Pulses: Normal pulses.   Pulmonary:      Breath sounds: Rhonchi present. No wheezing.   Abdominal:      General: Bowel sounds are normal.      Palpations: Abdomen is soft.   Skin:     General: Skin is warm and dry.      Capillary Refill: Capillary refill takes less than 2 seconds.   Neurological:      General: No focal deficit present.      Mental Status: He is alert.   Psychiatric:         Behavior: Behavior normal.         Results:   Lab Results   Component Value Date    WBC 11.1 (H) 2024    HGB 8.7 (L) 2024    HCT 27.0 (L) 2024    .0 2024    CREATSERUM 3.35 (H) 2024    BUN 35 (H) 2024     (L) 2024    K 4.7 2024    CL 96 (L) 2024    CO2 33.0 (H) 2024     (H) 2024    CA 9.1 2024    ALB 3.0 (L) 2024    ALKPHO 116 2024    BILT 0.3 2024    TP 5.7 2024    AST 32 2024    ALT 29 2024    PTT 30.1 2024    INR 1.45 (H) 2024    T4F 0.9 2022    TSH 2.844 2024     (H) 2024    PSA 2.94 10/20/2021    DDIMER 6.07 (H) 2024    ESRML 10 2024    CRP 1.30 (H) 2024    MG 2.2  12/11/2024    PHOS 4.0 12/11/2024    TROP <0.045 07/25/2019    TROPHS 52 12/03/2024     08/05/2023    B12 672 07/04/2024       No results found.        Assessment & Plan:     Peripheral polyneuropathy; checked rpr ok; thyroid; electropheresis; b12 ok 7/24  -Patient p/w bilateral lower extremity pain  -Significantly worse over the past 3 to 4 days.  -No real improvement with gabapentin at home  -Difficulties with ambulation because of pain  -Pain control as able; pt screaming in pain or asleep; pain clinic saw; await mri  poss steroid   -PT/OT  -Continue to monitor  -pending MRI results for planning of WILLIAM.      ESRD on HD  -Normally receives dialysis on Monday Wednesday Friday.  Last session was on Wednesday.  -Nephrology consulted, further HD per nephrology.     Anemia of chronic renal disease and iron defy checked stool for blood, neg;  -Likely secondary to ESRD as above  -Hemoglobin noted to be 7.1  -Continue to monitor     Type 2 DM will be worsened  - Monitoring Blood glucose with POC checks  - SSI to cover hyperglycemia  - Hypoglycemia protocol  - Will monitor and adjust agents as needed.      -KRAIG  -Hypertension  -Chronic dCHF  -Pulm HTN    Afib has watchman dr romano put in in 9/2024; mri ok with watchman per dr coffey    Severe ls disk disease in 2023; per pain recheck mri; hope to inj        Cad with left circ stent 5/24 poss need steroid inj; will hold and notify cards dr gale    Patient will require inpatient services that will reasonably be expected to span two midnight's based on the clinical documentation in H+P.   Based on patients current state of illness, I anticipate that, after discharge, patient will require TBD.  Complex mdm; coordinating care with nurse/mis cards and counseling pt and with his permission his wife on phone about labs/neuropathy/mri/plavix/stent

## 2024-12-11 NOTE — PHYSICAL THERAPY NOTE
PHYSICAL THERAPY TREATMENT NOTE - INPATIENT     Room Number: 553/553-A       Presenting Problem: peripheral neuropathy  Co-Morbidities : BLE leg pain    Problem List  Principal Problem:    Peripheral polyneuropathy  Active Problems:    ESRD on hemodialysis (HCC)    Hypophosphatemia      PHYSICAL THERAPY ASSESSMENT   Patient demonstrates fair progress this session, goals  remain in progress.      Patient is requiring moderate assist, maximum assist, and assist of 2  as a result of the following impairments: decreased functional strength and medical status.     Patient continues to function below baseline with bed mobility, transfers, and gait.  Next session anticipate patient to progress bed mobility, transfers, and gait.  Physical Therapy will continue to follow patient for duration of hospitalization.    Patient continues to benefit from continued skilled PT services: to promote return to prior level of function and safety with continuous assistance and gradual rehabilitative therapy .    PLAN DURING HOSPITALIZATION  Nursing Mobility Recommendation : Lift Equipment  PT Device Recommendation: Rolling walker  PT Treatment Plan: Bed mobility;Body mechanics;Patient education;Gait training;Stair training;Transfer training;Balance training  Frequency (Obs): 3-5x/week     SUBJECTIVE  \"Just pull me up\"    OBJECTIVE  Precautions: Limb alert - right;Bed/chair alarm    PAIN ASSESSMENT   Rating: Unable to rate  Location: generalized body pains  Management Techniques: Body mechanics;Activity promotion;Repositioning    BALANCE  Static Sitting: Poor +  Dynamic Sitting: Poor  Static Standing: Poor -  Dynamic Standing: Poor -    AM-PAC '6-Clicks' INPATIENT SHORT FORM - BASIC MOBILITY  How much difficulty does the patient currently have...  Patient Difficulty: Turning over in bed (including adjusting bedclothes, sheets and blankets)?: A Lot   Patient Difficulty: Sitting down on and standing up from a chair with arms (e.g.,  wheelchair, bedside commode, etc.): A Lot   Patient Difficulty: Moving from lying on back to sitting on the side of the bed?: A Lot   How much help from another person does the patient currently need...   Help from Another: Moving to and from a bed to a chair (including a wheelchair)?: A Lot   Help from Another: Need to walk in hospital room?: A Lot   Help from Another: Climbing 3-5 steps with a railing?: Total     AM-PAC Score:  Raw Score: 11   Approx Degree of Impairment: 72.57%   Standardized Score (AM-PAC Scale): 33.86   CMS Modifier (G-Code): CL    FUNCTIONAL ABILITY STATUS  Functional Mobility/Gait Assessment  Gait Assistance: Maximum assistance (assist of 2)  Distance (ft): 3ft  Assistive Device: Rolling walker  Pattern: Shuffle (right and left feet sliding forward)  Rolling:  not tested   Supine to Sit: moderate assist and assist of 2 cues to initiate moving legs to edge of bed   Sit to Supine:  not tested   Sit to Stand: moderate assist and assist of 2 posterior lean with feet sliding     Skilled Therapy Provided: Patient on room air. Patient currently with mod A x 2 for bed mobility. Mod A x 1 for sitting balance edge of bed. Patient with posterior lean when sitting, when standing with mod A x 2, was still having posterior lean and did not appear to have sustained gluteal activation as he was beginning to sag and feet were sliding forward. Patient able to take steps to bedside chair with max A x 2, posterior lean, and sliding feet, assisted into bedside chair with max A x 2. Patient able to stand from bedside chair with max A x 2 but still with posterior lean and feet sliding. Encouraged to do glute sets and encouraged family to have a plan set up for D/C in the case patient does not recover well in [facility]. Patient's family agreeable. The patient's Approx Degree of Impairment: 72.57% has been calculated based on documentation in the [facility] '6 clicks' Inpatient Daily Activity Short Form.  Research supports that  patients with this level of impairment may benefit from rehab facility. Patient received semi-fowlers in bed, agreeable to physical therapy.     PATIENT EDUCATION  Education Provided To: Patient  Patient Education: Role of Physical Therapy;Plan of Care  Patient's Response to Education: Verbalized Understanding             Final disposition will be made by interdisciplinary medical team.    Patient End of Session: Up in chair;Needs met;Call light within reach;RN aware of session/findings;All patient questions and concerns addressed;Alarm set;Family present    CURRENT GOALS   Goals to be met by: 12/20/24  Patient Goal Patient's self-stated goal is: to go home   Goal #1 Patient is able to demonstrate supine - sit EOB @ level: minimum assistance      Goal #1   Current Status Not met   Goal #2 Patient is able to demonstrate transfers Sit to/from Stand at assistance level: minimum assistance with walker - rolling      Goal #2  Current Status Not met   Goal #3 Patient is able to ambulate 10 feet with assist device: walker - rolling at assistance level: minimum assistance   Goal #3   Current Status Not met   Goal #4 Patient to demonstrate independence with home activity/exercise instructions provided to patient in preparation for discharge.   Goal #4   Current Status In progress     Therapeutic Activity: 25 minutes

## 2024-12-11 NOTE — CHRONIC PAIN
[facility]  Inpatient Pain Management Progress Note      Patient name: Ollie Hernández 79 year old male  : 1945  MRN: [id number]    Diagnosis:   1. Peripheral polyneuropathy    2. ESRD on hemodialysis ([facility])        Reason for Consult: LE pain    Current hospital day: Hospital Day: 6    Pain Scores: 5    Subjective:   Patient is pain is moderately controlled.  He states the medications that we are giving him make him sleepy but do allow him to feel better.  He still reports occasional shocks in his bilateral feet.  Currently patient is getting gabapentin 100 mg twice daily, lidocaine menthol patch, nortriptyline 10 mg nightly, acetaminophen 500 mg every 4 hours, Norco 5 mg every 4 hours with hydromorphone 0.1 to 0.2 mg every 2 as needed.    History:  Past Medical History:    Anemia    Asthma ([facility])    Back problem    BPH (benign prostatic hyperplasia)    Calculus of kidney    Cataract    Coronary atherosclerosis    Diabetes ([facility])    ESRD (end stage renal disease) on dialysis ([facility])    Essential hypertension    High blood pressure    High cholesterol    History of blood transfusion    Hyperlipidemia    Neuropathy    hands and feet    KRAIG on CPAP    Renal disorder    Sleep apnea    Visual impairment    glasses    Vocal cord paralysis, unilateral partial     Past Surgical History:   Procedure Laterality Date    Appendectomy          Back surgery      Neck/back -     Capsule endoscopy - internal referral      Cataract extraction Right 2022    PHACOEMULSIFICATION WITH POSTERIOR CHAMBER INTRAOCULAR LENS, RIGHT EYE    Cataract extraction Left 2021    PHACOEMULSIFICATION WITH POSTERIOR CHAMBER INTRAOCULAR LENS, LEFT EYE    Cath bare metal stent (bms)      Cath drug eluting stent  2024    Successful IVUS guided PCI of the circumflex with a ABIMBOLA    Colonoscopy      Colonoscopy N/A 2021    Procedure: COLONOSCOPY;  Surgeon: Michael Gautam MD;  Location: [facility]     Colonoscopy N/A 06/03/2024    Procedure: COLONOSCOPY;  Surgeon: Gabriel Saldana MD;  Location: [facility] ENDOSCOPY    Colonoscopy N/A 06/15/2024    Procedure: COLONOSCOPY;  Surgeon: Carlyn Storey MD;  Location: [facility] ENDOSCOPY    Colonoscopy N/A 07/31/2024    Procedure: COLONOSCOPY;  Surgeon: Gabriel Saldana MD;  Location: [facility] ENDOSCOPY    Dialysis procedure  02/17/2023    right internal jugular tunneled dialysis catheter placement.    Hand/finger surgery unlisted      Accidental trauma    Spine surgery procedure unlisted      Total hip arthroplasty Left 10/04/2024    Left anterior total hip arthroplasty    Upper gi endoscopy,diagnosis       Family History   Problem Relation Age of Onset    Cancer Father         Kidney    Breast Cancer Mother     Diabetes Mother     Diabetes Maternal Grandmother     Diabetes Maternal Grandfather     Heart Disorder Other         Uncle      reports that he quit smoking about 43 years ago. His smoking use included cigarettes. He started smoking about 60 years ago. He has a 17 pack-year smoking history. He has never used smokeless tobacco. He reports that he does not drink alcohol and does not use drugs.    Allergies:  Allergies[1]    Current Medications:  Scheduled Meds:   lidocaine-menthol  2 patch Transdermal Daily    sodium ferric gluconate  125 mg Intravenous Daily    epoetin donna  10,000 Units Subcutaneous Once per day on Monday Wednesday Friday    gabapentin  100 mg Per G Tube BID    nortriptyline  10 mg Oral Nightly    amiodarone  200 mg Per G Tube Daily    aspirin  81 mg Oral Daily    atorvastatin  40 mg Per G Tube Nightly    carvedilol  25 mg Per G Tube BID    [Held by provider] clopidogrel  75 mg Oral Daily    fluticasone propionate  2 spray Nasal Daily    insulin degludec  5 Units Subcutaneous Nightly    lansoprazole  30 mg Per G Tube QAM AC    heparin  5,000 Units Subcutaneous Q8H STEPHANIE    insulin aspart  1-7 Units Subcutaneous TID CC and HS     Continuous Infusions:   dextrose 10%        PRN Meds:.  LORazepam    heparin    lidocaine-prilocaine    dextrose 10%    lipase-protease-amylase (Lip-Prot-Amyl) **AND** sodium bicarbonate    metoprolol **OR** metoprolol    heparin    lidocaine-prilocaine    HYDROmorphone **OR** HYDROmorphone **OR** HYDROmorphone    glucose **OR** glucose **OR** glucose-vitamin C **OR** dextrose **OR** glucose **OR** glucose **OR** glucose-vitamin C    acetaminophen    acetaminophen **OR** HYDROcodone-acetaminophen **OR** [DISCONTINUED] HYDROcodone-acetaminophen    polyethylene glycol (PEG 3350)    sennosides    bisacodyl    Exam:Blood pressure 132/52, pulse 69, temperature 98.3 °F (36.8 °C), temperature source Oral, resp. rate 20, height 5' 4\" (1.626 m), weight 146 lb 14.4 oz (66.6 kg), SpO2 98%.    General: Alert and oriented x3, NAD, appears stated age, appropriate disposition and demeanor, answers questions appropriately   Head: normocephalic, atraumatic  Eyes: anicteric; no injection  Ears: no obvious deformities noted   Nose: externally grossly within normal limits, no unusual discharge or rhinorrhea   Throat: lips grossly within normal limits by visual exam externally  Neck: supple, trachea midline, no obvious JVD  Chest: S1, S2, RRR, no obvious visual deformity  Respiratory: CTAB  Abdomen: soft, non-tender, bowel sounds present  Imaging:   Patient to get lumbar MRI    Assessment:  79 year old male with past medical history of T2DM, HTN, HLD, ESRD on HD MWF via R AVF, CAD s/p PCI (5/2024), A.fib on Eliquis, HFpEF, KRAIG, BPH, history of recurrent gastrointestinal bleeding, who presented with bilateral lower extremity pain.    Pain management was consulted for bilateral lower extremities pain in the setting of peripheral neuropathy.  Currently patient states his pain is moderately controlled on his current regimen.  He states the [place] helps his pain however it does make him tired.  He is on low-dose gabapentin given his renal failure.  He is also on nortriptyline 10  mg nightly.    Plan:  -Will await MRI results.  Patient is on anticoagulation  -Continue pain medications as patient states his pain is controlled at this time.  -Continue with Norco 10 mg as needed  -Continue low-dose gabapentin as well as nortriptyline  -Continue lidocaine patch, patient has cream ordered as well if patch does not provide efficacy.       Vince Kline MD   Total Time: 25 minutes     Lengthy discussion of risks, benefits, and alternative treatments were reviewed to patients satisfaction. All questions were answered. Patient agreeable to plan. Greater than 50% of the time was spent with counseling (nature of discussion centered around pain, therapy, and treatment options), face to face time, time spent reviewing data, obtaining patient information and discussing the care with the patients health care providers.     Chronic Pain Service [phone]         [1]   Allergies  Allergen Reactions    Adhesive Tape OTHER (SEE COMMENTS)     Severe rashes    Dust Mite Extract RASH

## 2024-12-12 LAB
GLUCOSE BLDC GLUCOMTR-MCNC: 212 MG/DL (ref 70–99)
GLUCOSE BLDC GLUCOMTR-MCNC: 223 MG/DL (ref 70–99)
GLUCOSE BLDC GLUCOMTR-MCNC: 235 MG/DL (ref 70–99)
GLUCOSE BLDC GLUCOMTR-MCNC: 245 MG/DL (ref 70–99)

## 2024-12-12 PROCEDURE — 99232 SBSQ HOSP IP/OBS MODERATE 35: CPT | Performed by: INTERNAL MEDICINE

## 2024-12-12 PROCEDURE — 99233 SBSQ HOSP IP/OBS HIGH 50: CPT | Performed by: INTERNAL MEDICINE

## 2024-12-12 NOTE — PROGRESS NOTES
[facility]  part of [facility]    Progress Note    Ollie Hernández Patient Status:  Inpatient    1945 MRN [id number]   Location [facility] 5SW/SE Attending Dee Joyce,*   Hosp Day # 6 PCP Wili Parmar MD     Chief Complaint: foot pain    Subjective:   Family at bedside, patient still in significant pain per family.  Appears appears calm at this time.  MRI results discussed with family member.    Objective:   Blood pressure 142/63, pulse 77, temperature 99.7 °F (37.6 °C), temperature source Oral, resp. rate 20, height 5' 4\" (1.626 m), weight 151 lb 7.3 oz (68.7 kg), SpO2 94%.  Physical Exam  Vitals and nursing note reviewed.   Constitutional:       General: He is not in acute distress.     Appearance: He is ill-appearing. He is not toxic-appearing or diaphoretic.   Cardiovascular:      Rate and Rhythm: Normal rate.      Pulses: Normal pulses.   Pulmonary:      Breath sounds: Rhonchi present. No wheezing.   Abdominal:      General: Bowel sounds are normal.      Palpations: Abdomen is soft.   Skin:     General: Skin is warm and dry.      Capillary Refill: Capillary refill takes less than 2 seconds.   Neurological:      General: No focal deficit present.      Mental Status: He is alert.   Psychiatric:         Behavior: Behavior normal.         Results:   Lab Results   Component Value Date    WBC 11.1 (H) 2024    HGB 8.7 (L) 2024    HCT 27.0 (L) 2024    .0 2024    CREATSERUM 3.35 (H) 2024    BUN 35 (H) 2024     (L) 2024    K 4.7 2024    CL 96 (L) 2024    CO2 33.0 (H) 2024     (H) 2024    CA 9.1 2024    ALB 3.0 (L) 2024    ALKPHO 116 2024    BILT 0.3 2024    TP 5.7 2024    AST 32 2024    ALT 29 2024    PTT 30.1 2024    INR 1.45 (H) 2024    T4F 0.9 2022    TSH 2.844 2024     (H) 2024    PSA 2.94 10/20/2021     DDIMER 6.07 (H) 09/29/2024    ESRML 10 06/16/2024    CRP 1.30 (H) 06/16/2024    MG 2.2 12/11/2024    PHOS 4.0 12/11/2024    TROP <0.045 07/25/2019    TROPHS 52 12/03/2024     08/05/2023    B12 672 07/04/2024       MRI SPINE LUMBAR (CPT=72148)    Result Date: 12/11/2024  CONCLUSION:   1. Multilevel degenerative changes of the lumbar spine, which are described in detail above and are similar when compared to 02/07/2023. 2. At L4-L5, there is severe canal stenosis, severe bilateral subarticular zone stenosis, and moderate to severe bilateral foraminal narrowing. 3. Redemonstrated postoperative changes from prior decompressive laminectomies at L2-L4. 4. Grade 1 anterolisthesis of L4 on L5. 5. No acute fracture or traumatic listhesis of the lumbar spine. 6. Multifocal abnormal increased T2/STIR signal involving the paraspinal musculature, which may reflect atrophy, denervation, or less likely reflect myositis. 7. Circumferential bladder wall thickening, which may be secondary to underdistention, cystitis, or chronic outlet obstruction. 8. Lesser incidental findings described above.     Dictated by (CST): Vernon Law MD on 12/11/2024 at 1:13 PM     Finalized by (CST): Vernon Law MD on 12/11/2024 at 1:50 PM               Assessment & Plan:         Peripheral polyneuropathy; checked rpr ok; thyroid; electropheresis; b12 ok 7/24  -Patient p/w bilateral lower extremity pain  -Significantly worse over the past 3 to 4 days.  -No real improvement with gabapentin at home  -Difficulties with ambulation because of pain  -Pain control as able; pt screaming in pain or asleep; pain clinic saw; await mri  poss steroid   -PT/OT  -Continue to monitor  -MRI results reviewed awaiting further recommendations from pain service for possible WILLIAM     ESRD on HD  -Normally receives dialysis on Monday Wednesday Friday.  Last session was on Wednesday.  -Nephrology consulted, further HD per nephrology.     Anemia of chronic renal  disease and iron defy checked stool for blood, neg;  -Likely secondary to ESRD as above  -Hemoglobin noted to be 7.1  -Continue to monitor     Type 2 DM will be worsened  - Monitoring Blood glucose with POC checks  - SSI to cover hyperglycemia  - Hypoglycemia protocol  - Will monitor and adjust agents as needed.      -KRAIG  -Hypertension  -Chronic dCHF  -Pulm HTN    Afib has watchman dr romnao put in in 9/2024; mri ok with watchman per dr coffey           Cad with left circ stent 5/24 poss need steroid inj; will hold and notify cards dr gale    Patient will require inpatient services that will reasonably be expected to span two midnight's based on the clinical documentation in H+P.   Based on patients current state of illness, I anticipate that, after discharge, patient will require TBD.  Complex mdm; coordinating care with nurse/mis cards and counseling pt and with his permission his wife on phone about labs/neuropathy/mri/plavix/stent

## 2024-12-12 NOTE — CM/SW NOTE
Prior Authorization - Destination  Destination Type: Skilled nursing facility  Service Provider: Nellie  Payer Communication Destination Comments: Dominguez juan [id number]  Prior Authorization Status: Approved  Prior Authorization Start Date: 12/12/24 12/12 - 12/16. ([facility] 12/17/2024)    Maria Isabel John DSC

## 2024-12-12 NOTE — PLAN OF CARE
Phone call made to [facility] at 5:14pm and spoke to Miri to arrange for hemodialysis treatment for patient on tomorrow 12/13/2024 per Dr. Mirta Redman orders.

## 2024-12-12 NOTE — DIETARY NOTE
ADULT NUTRITION REASSESSMENT    Pt is at high nutrition risk.  Pt does not meet malnutrition criteria.      RECOMMENDATIONS TO MD: See Nutrition Intervention for enteral nutrition specifics     ADMITTING DIAGNOSIS:  ESRD on hemodialysis (HCC) [N18.6, Z99.2]Peripheral polyneuropathy [G62.9]    PERTINENT PAST MEDICAL HISTORY:   Past Medical History:    Anemia    Asthma (HCC)    Back problem    BPH (benign prostatic hyperplasia)    Calculus of kidney    Cataract    Coronary atherosclerosis    Diabetes (HCC)    ESRD (end stage renal disease) on dialysis (HCC)    Essential hypertension    High blood pressure    High cholesterol    History of blood transfusion    Hyperlipidemia    Neuropathy    hands and feet    KRAIG on CPAP    Renal disorder    Sleep apnea    Visual impairment    glasses    Vocal cord paralysis, unilateral partial     PATIENT STATUS: Initial 12/07/24: Pt admit for ESRD on HD and peripheral polyneuropathy. PMH sig for ESRD on HD (MWF), Diabetes (A1c 6.8% on 12/3/24), G-tube (placed 10/19/24) and others noted above. Pt assessed due to screening at risk for pressure injury (PI) and chronic g-tube. RD also received consult to order and manage tube feedings. Pt known to nutrition services from previous admissions, last seen 10/21/24. Chart reviewed, pt admitted with c/o bilateral foot pain - recently discharged from rehab for hip fracture. PEG tube placed recently s/p aspiration pneumonia and inability to swallow. Discussion with RN, no concerns. Pt visited, spouse at bedside assisting in home TF regimen and weight history. Spouse reports using bolus feedings as continuous feedings over 18 hours was too difficult due to in and out of various doctor appointments therefore was told to switch to bolus feedings - timeframe unknown. Spouse reports doing 1 carton 3x/day (3 cartons total) - spouse reports trying for 3.5 cartons daily but consistently 3 cartons daily. Spouse reports usual body weight (UBW) prior to  getting sick 155-157# - last known around September 2024. Current weight 148# 9.6 oz. EMR weight review, last known weight # on 11/30/24. Per EMR weight review, pt noted weighing 143# 12 oz on 10/2/24 - stable, 155# 11 oz on 9/28/24 - 7.1# or 4.6% weight loss x 3 months (non-significant),  157# 11 oz on 6/1/24 - 9.1# or 5.8% weight loss x 6 months (non-significant) and 166# on 11/30/23 - 17.4# or 10.5% weight loss x 13 months (non-significant). Nutrition focused physical exam (NFPE) completed, see below for details. See nutrition intervention for TF recommendations, start with continuous feedings and transition to bolus feedings prior to discharge.     12/08/24 UPDATE: Pt discussed with RN via phone. Adjusted EN feeds to home regimen, bolus feeds, now that pt is tolerating feeds at goal rate. RN to hold continuous feeds now and start with first bolus at 1600 hrs.      12/9/2024 Update:  Re-evaluated d/t formula unavailability . Tolerating bolus feeding. + BM. Dialysis today. Labs this am were pre-dialysis values with notable hypokalemia and hypophosphatemia. Per discussion with MD, allowing to use Standard formula for few days and should resume to Nepro for when discharge. Resumed EN to continuous and used Jevity 1.5 fiber containing formula. Discussed with RN holding an hour before and after lansoprazole administration.      12/12/2024 Update:  Followed up early d/t need to change formula back to Renal formula. Tolerating current EN at goal rate. HD yesterday with 1500 ml fluid removed. BG noted with recent increase d/t formula change. On basal and corrective factor insulin. Remains on lansoprazole.     Will resume EN back to Renal formula ( Nepro) and Bolus feeding method for long term nutrition therapy.       FOOD/NUTRITION RELATED HISTORY:  Intake:  Per spouse: Nepro bolus feedings: 1 carton 3x/day (total of 3 cartons total). Provides 1260 calories, 57 grams protein and 516 ml free water.  FWF 1 syringe  before and after each feeding (unsure exact amount of syringe)  Spouse reports trying to get 3.5 cartons in daily.   Intake Meeting Needs: TF meeting needs     Food Allergies: No Known Food Allergies (NKFA)  Cultural/Ethnic/Zoroastrian Preferences: Not Obtained    GASTROINTESTINAL: +BM 12/12/24 - Soft brown medium stool x 1 and PEG/G-tube    MEDICATIONS: reviewed IVF noted  Lansoprazole q morning--on bolus EN feeding now.    lidocaine-menthol  2 patch Transdermal Daily    epoetin donna  10,000 Units Subcutaneous Once per day on Monday Wednesday Friday    gabapentin  100 mg Per G Tube BID    nortriptyline  10 mg Oral Nightly    amiodarone  200 mg Per G Tube Daily    aspirin  81 mg Oral Daily    atorvastatin  40 mg Per G Tube Nightly    carvedilol  25 mg Per G Tube BID    [Held by provider] clopidogrel  75 mg Oral Daily    fluticasone propionate  2 spray Nasal Daily    insulin degludec  5 Units Subcutaneous Nightly    lansoprazole  30 mg Per G Tube QAM AC    heparin  5,000 Units Subcutaneous Q8H STEPHANIE    insulin aspart  1-7 Units Subcutaneous TID CC and HS      dextrose 10%       LABS: reviewed A1c 6.8% on 12/3/24  Low K and Phos--resolved.   Not using lyte protocol as pt on dialysis.  Recent Labs     12/09/24  0546 12/10/24  0534 12/11/24  0653   * 205* 156*   BUN 27* 22 35*   CREATSERUM 3.09* 2.46* 3.35*   CA 9.3 9.0 9.1   MG 2.3  --  2.2    136 135*   K 3.4* 3.5 4.7   CL 99 98 96*   CO2 36.0* 33.0* 33.0*   PHOS 1.6* 1.0* 4.0   OSMOCALC 290 291 291     NUTRITION RELATED PHYSICAL FINDINGS:  - Nutrition Focused Physical Exam (NFPE): mild depletion body fat triceps region  - Fluid Accumulation: none  see RN documentation for details  - Skin Integrity: at risk and Stage 2 posterior scrotum and DTI left heel noted per flowsheet documentation  --> see RN documentation for details    ANTHROPOMETRICS:  HT: 162.6 cm (5' 4\")  WT: 68.7 kg (151 lb 7.3 oz) --12/12, appears stable over the course of admission.    Admit wt  : 148#   BMI: Body mass index is 26 kg/m².  BMI CLASSIFICATION: 25-29.9 kg/m2 - overweight  IBW: 130 lbs        114% IBW  Usual Body Wt: 155-157 lbs per spouse      95-96% UBW    WEIGHT HISTORY:  Patient Weight(s) for the past 336 hrs:   Weight   12/12/24 0604 68.7 kg (151 lb 7.3 oz)   12/10/24 0645 66.6 kg (146 lb 14.4 oz)   12/09/24 0423 67.1 kg (147 lb 14.4 oz)   12/08/24 0650 67.2 kg (148 lb 1.6 oz)   12/06/24 1805 67.4 kg (148 lb 9.6 oz)   12/06/24 1702 67.4 kg (148 lb 9.6 oz)     Wt Readings from Last 10 Encounters:   12/12/24 68.7 kg (151 lb 7.3 oz)   11/30/24 65.8 kg (145 lb)   10/22/24 60.3 kg (132 lb 14.4 oz)   08/22/24 72.5 kg (159 lb 12.8 oz)   08/20/24 71.7 kg (158 lb)   08/13/24 65.6 kg (144 lb 11.2 oz)   08/06/24 70.8 kg (156 lb)   08/01/24 66.5 kg (146 lb 8 oz)   06/27/24 73.9 kg (163 lb)   06/13/24 71 kg (156 lb 8 oz)     NUTRITION DIAGNOSIS/PROBLEM:   Inadequate enteral nutrition infusion related to Infusion volume not reached or schedule for infusion interrupted as evidenced by TF currently not running   Nutrition Diagnosis Progress: Improvement (unresolved) EN at full nutrition.     Increased nutrient needs related to increased demand of nutrient as evidenced by pressure injuries per flowsheet   Nutrition Diagnosis Progress:    Remains of concern    NUTRITION INTERVENTION:   NUTRITION PRESCRIPTION:   Estimated Nutrition needs: --dosing wt of 67.4 kg - wt taken on 12/6/24  Calories: 9242-1329 calories/day (MSJ 1314 kcal x 1.3 AF x 1.0 IF or 25-30  calories per kg Dosing wt)   Protein: 67-81 g protein/day (1.0-1.2 g protein/kg Dosing wt)  Fluid Needs: 1200 ml (20ml/kg 59 kg IBW)  Adjust per clinical status (ESRD)    - Diet:       Procedures    NPO   - Enteral Nutrition ( EN) : RD adjusted to the following,  Bolus feeding Nepro 4.5 cartons daily via G-tube  (1 @ 0800, 1.5 @ 1200, 1 @ 1600, 1 @ 2000). FWF 50 ml before and after each feeding.   Provides: 1890 calories, 86 grams protein, 774 ml free  water. Total water: 1174 ml total water.  Met 100% of nutrition needs and or >100% of RDIs    - RD Malnutrition Care Plan:  Long term use of Nutrition support therapy ( NST)  - Vitamin and mineral supplements: none  - Nutrition education: assess education needs   - Coordination of nutrition care: collaboration with other providers  - Discharge and transfer of nutrition care to new setting or provider: monitor plans    MONITOR AND EVALUATE/NUTRITION GOALS:  - Food and Nutrient Administration:      Monitor: tolerance to enteral nutrition, adequacy of enteral nutrition, resumption of enteral nutrition, and for enteral nutrition adjustment  - Anthropometric Measurement:    Monitor weight  - Nutrition Goals:    maintain wt within 5%, tube feed meets greater than 80% of needs, labs within acceptable limits, minimize lean body mass loss, support wound healing, support body systems, and improved GI status      DIETITIAN FOLLOW UP: RD to follow and monitor nutrition status      Sabine Acosta RD, LDN, [facility]  Clinical Dietitian  [phone]

## 2024-12-12 NOTE — CM/SW NOTE
Pt discussed in RN DC rounds. Insurance auth - [employer]- [id number]  is pending at this time for [facility] [place]. HD is approved.     958am- Auth approved, [facility] has bed a avail for today if medically stable     1131am- Patient is not medically stable to discharge today. Wife was called and aware of above.  Sw placed ambulance on will call from 12/12-12/16.     Pt requires an ambulance according to the RN due to being a max assist. SW called, spoke to Serjio,  and ordered an Ambulance from [facility] to transport pt [facility]  at WILL CALL 12/12/2024-12/16/2024.  PCS flow sheet completed. RN to attached to AVS and print out. PCS needs date      Plan: Pending medical clearance, DC to [facility], auth approved 12/12-12/16, [facility] on will call 12/12/2024-12/16/2024, *PCS needs a date    SW/CM to remain available for support and/or discharge planning.     Annette Shannon, MSW, LSW   x [phone]

## 2024-12-12 NOTE — PAYOR COMM NOTE
--------------  CONTINUED STAY REVIEW    Payor: [facility] MEDICARE  Subscriber #:  [id number]  Authorization Number: [id number]    Admit date: 12/6/24  Admit time:  5:03 PM      12/11/24              Subjective:    Pt in HD  Still having pain-  MRI results pending    Blood pressure 132/52, pulse 69, temperature 98.3 °F (36.8 °C), temperature source Oral, resp. rate 20, height 5' 4\" (1.626 m), weight 146 lb 14.4 oz (66.6 kg), SpO2 98%.       Appearance: He is ill-appearing.   Cardiovascular:      Rate and Rhythm: Normal rate.      Pulses: Normal pulses.   Pulmonary:      Breath sounds: Rhonchi present. No wheezing.   Abdominal:      General: Bowel sounds are normal.      Palpations: Abdomen is soft.   Skin:     General: Skin is warm and dry.      Capillary Refill: Capillary refill takes less than 2 seconds.   Neurological:      General: No focal deficit present.      Mental Status: He is alert.   Psychiatric:         Behavior: Behavior normal.      Lab Results   Component Value Date     WBC 11.1 (H) 12/11/2024     HGB 8.7 (L) 12/11/2024     HCT 27.0 (L) 12/11/2024     .0 12/11/2024     CREATSERUM 3.35 (H) 12/11/2024     BUN 35 (H) 12/11/2024      (L) 12/11/2024     K 4.7 12/11/2024     CL 96 (L) 12/11/2024     CO2 33.0 (H) 12/11/2024      (H) 12/11/2024     CA 9.1 12/11/2024     MG 2.2 12/11/2024     PHOS 4.0 12/11/2024          Assessment & Plan:     Peripheral polyneuropathy; checked rpr ok; thyroid; electropheresis; b12 ok 7/24  -Patient p/w bilateral lower extremity pain  -Significantly worse over the past 3 to 4 days.  -No real improvement with gabapentin at home  -Difficulties with ambulation because of pain  -Pain control as able; pt screaming in pain or asleep; pain clinic saw; await mri  poss steroid   -PT/OT  -Continue to monitor  -pending MRI results for planning of WILLIAM.      ESRD on HD  -Normally receives dialysis on Monday Wednesday Friday.  Last session was on  Wednesday.  -Nephrology consulted, further HD per nephrology.     Anemia of chronic renal disease and iron defy checked stool for blood, neg;  -Likely secondary to ESRD as above  -Hemoglobin noted to be 7.1  -Continue to monitor     Type 2 DM will be worsened  - Monitoring Blood glucose with POC checks  - SSI to cover hyperglycemia  - Hypoglycemia protocol  - Will monitor and adjust agents as needed.      -KRAIG  -Hypertension  -Chronic dCHF  -Pulm HTN     [facility] has watchman dr romano put in in 9/2024; mri ok with watchman per dr coffey     Severe ls disk disease in 2023; per pain recheck mri; hope to inj         Cad with left circ stent 5/24 poss need steroid inj; will hold and notify cards dr gale                           MEDICATIONS ADMINISTERED IN LAST 1 DAY:  amiodarone (Pacerone) tab 200 mg       Date Action Dose Route User    12/12/2024 0924 Given 200 mg Per G Tube Ivis Beatty RN          aspirin DR tab 81 mg       Date Action Dose Route User    12/12/2024 0924 Given 81 mg Oral Ivis Beatty RN          atorvastatin (Lipitor) tab 40 mg       Date Action Dose Route User    12/11/2024 2122 Given 40 mg Per G Tube Tony Womack RN          carvedilol (Coreg) tab 25 mg       Date Action Dose Route User    12/12/2024 0924 Given 25 mg Per G Tube Ivis Baetty RN    12/11/2024 2122 Given 25 mg Per G Tube Tony Womack RN          epoetin donna (Epogen, Procrit) 27813 UNIT/ML injection 10,000 Units       Date Action Dose Route User    12/11/2024 1841 Given 10,000 Units Subcutaneous (Left Lower Abdomen) Janice Sheth RN          gabapentin (Neurontin) 250 MG/5ML oral solution 100 mg       Date Action Dose Route User    12/12/2024 0948 Given 100 mg Per G Tube Ivis Beatty RN    12/11/2024 2157 Given 100 mg Per G Tube Tony Womack RN          heparin (Porcine) 5000 UNIT/ML injection 5,000 Units       Date Action Dose Route User    12/12/2024 1417 Given 5,000 Units Subcutaneous (Right  Lower Abdomen) Ivis Beatty RN    12/12/2024 0524 Given 5,000 Units Subcutaneous (Right Lower Abdomen) Tony Womack RN    12/11/2024 2122 Given 5,000 Units Subcutaneous (Left Lower Abdomen) Tony Womack RN          HYDROcodone-acetaminophen (Norco) 5-325 MG per tab 1 tablet       Date Action Dose Route User    12/12/2024 0524 Given 1 tablet Oral Tony Womack RN    12/11/2024 2122 Given 1 tablet Oral Tony Womack RN    12/11/2024 1712 Given 1 tablet Oral Janice Sheth RN          HYDROmorphone (Dilaudid) 1 MG/ML injection 0.4 mg       Date Action Dose Route User    12/12/2024 0945 Given 0.4 mg Intravenous Ivis Beatty RN          insulin aspart (NovoLOG) 100 Units/mL FlexPen 1-7 Units       Date Action Dose Route User    12/12/2024 1248 Given 3 Units Subcutaneous (Left Lower Abdomen) Ivis Beatty RN    12/12/2024 0944 Given 4 Units Subcutaneous (Right Lower Abdomen) Ivis Beatty RN    12/11/2024 1709 Given 2 Units Subcutaneous (Left Upper Arm) Janice Sheth RN          insulin degludec (Tresiba) 100 units/mL flextouch 5 Units       Date Action Dose Route User    12/11/2024 2122 Given 5 Units Subcutaneous (Left Lower Abdomen) Tony Womack RN          lansoprazole (Prevacid Solutab) disintegrating tab 30 mg       Date Action Dose Route User    12/12/2024 0524 Given 30 mg Per G Tube Tony Womack RN          lidocaine-menthol 4-1 % patch 2 patch       Date Action Dose Route User    12/12/2024 0924 Patch Applied 2 patch Transdermal (Left Leg) Ivis Beatty RN          nortriptyline (Pamelor) cap 10 mg       Date Action Dose Route User    12/11/2024 2122 Given 10 mg Oral Tony Womack RN          sodium ferric gluconate (Ferrlecit) 125 mg in sodium chloride 0.9% 100mL IVPB premix       Date Action Dose Route User    12/12/2024 0944 New Bag 125 mg Intravenous Rogerio, Ivis L, RN            Vitals (last day)       Date/Time Temp Pulse Resp BP SpO2 Weight O2 Device O2 Flow  Rate (L/min) [facility]    12/12/24 1303 99.3 °F (37.4 °C) 73 20 106/52 91 % -- None (Room air) --     12/12/24 0914 99.3 °F (37.4 °C) 77 20 108/54 93 % -- None (Room air) --     12/12/24 0527 99.7 °F (37.6 °C) 77 20 142/63 94 % -- None (Room air) --     12/11/24 2118 99.3 °F (37.4 °C) 80 18 142/48 92 % -- None (Room air) --     12/11/24 1703 97.8 °F (36.6 °C) -- 18 112/53 98 % -- None (Room air) --     12/11/24 0947 98.3 °F (36.8 °C) -- 20 132/52 98 % -- Nasal cannula 1 L/min     12/11/24 0626 -- 69 -- -- 98 % -- Nasal cannula 2 L/min     12/11/24 0624 -- 69 -- -- 89 % -- None (Room air) --     12/11/24 0419 -- 71 -- -- 99 % -- Nasal cannula 1 L/min     12/11/24 0415 -- 71 -- -- 99 % -- Nasal cannula 2 L/min     12/11/24 0410 -- 72 -- -- 100 % -- Nasal cannula 3 L/min     12/11/24 0405 -- 79 -- -- 97 % -- Nasal cannula 4 L/min     12/11/24 0404 99.2 °F (37.3 °C) 72 22 136/49 88 % -- None (Room air) --      Blood Transfusion Record       Product Unit Status Volume Start End            Transfuse RBC       24  [id number]  *-[id number] Stopped 650 mL 12/10/24 1204 12/10/24 1604

## 2024-12-12 NOTE — PLAN OF CARE
Problem: Patient Centered Care  Goal: Patient preferences are identified and integrated in the patient's plan of care  Description: Interventions:  - What would you like us to know as we care for you? I am from home with my wife  - Provide timely, complete, and accurate information to patient/family  - Incorporate patient and family knowledge, values, beliefs, and cultural backgrounds into the planning and delivery of care  - Encourage patient/family to participate in care and decision-making at the level they choose  - Honor patient and family perspectives and choices  Outcome: Progressing     Problem: Diabetes/Glucose Control  Goal: Glucose maintained within prescribed range  Description: INTERVENTIONS:  - Monitor Blood Glucose as ordered  - Assess for signs and symptoms of hyperglycemia and hypoglycemia  - Administer ordered medications to maintain glucose within target range  - Assess barriers to adequate nutritional intake and initiate nutrition consult as needed  - Instruct patient on self management of diabetes  Outcome: Progressing     Problem: Patient/Family Goals  Goal: Patient/Family Long Term Goal  Description: Patient's Long Term Goal: Discharge    Interventions:  - Monitor vitals  - Monitor appropriate labs  - Administer medications as ordered  - Follow MD's orders  - Update patient on plan of care   - Discharge planning     - See additional Care Plan goals for specific interventions  Outcome: Progressing  Goal: Patient/Family Short Term Goal  Description: Patient's Short Term Goal:     Interventions:   -   - See additional Care Plan goals for specific interventions  Outcome: Progressing     Problem: MUSCULOSKELETAL - ADULT  Goal: Return mobility to safest level of function  Description: INTERVENTIONS:  - Assess patient stability and activity tolerance for standing, transferring and ambulating w/ or w/o assistive devices  - Assist with transfers and ambulation using safe patient handling equipment as  needed  - Ensure adequate protection for wounds/incisions during mobilization  - Obtain PT/OT consults as needed  - Advance activity as appropriate  - Communicate ordered activity level and limitations with patient/family  Outcome: Progressing     Problem: PAIN - ADULT  Goal: Verbalizes/displays adequate comfort level or patient's stated pain goal  Description: INTERVENTIONS:  - Encourage pt to monitor pain and request assistance  - Assess pain using appropriate pain scale  - Administer analgesics based on type and severity of pain and evaluate response  - Implement non-pharmacological measures as appropriate and evaluate response  - Consider cultural and social influences on pain and pain management  - Manage/alleviate anxiety  - Utilize distraction and/or relaxation techniques  - Monitor for opioid side effects  - Notify MD/LIP if interventions unsuccessful or patient reports new pain  - Anticipate increased pain with activity and pre-medicate as appropriate  Outcome: Progressing     Problem: RISK FOR INFECTION - ADULT  Goal: Absence of fever/infection during anticipated neutropenic period  Description: INTERVENTIONS  - Monitor WBC  - Administer growth factors as ordered  - Implement neutropenic guidelines  Outcome: Progressing     Problem: SAFETY ADULT - FALL  Goal: Free from fall injury  Description: INTERVENTIONS:  - Assess pt frequently for physical needs  - Identify cognitive and physical deficits and behaviors that affect risk of falls.  - [place] fall precautions as indicated by assessment.  - Educate pt/family on patient safety including physical limitations  - Instruct pt to call for assistance with activity based on assessment  - Modify environment to reduce risk of injury  - Provide assistive devices as appropriate  - Consider OT/PT consult to assist with strengthening/mobility  - Encourage toileting schedule  Outcome: Progressing     Problem: DISCHARGE PLANNING  Goal: Discharge to home or other  facility with appropriate resources  Description: INTERVENTIONS:  - Identify barriers to discharge w/pt and caregiver  - Include patient/family/discharge partner in discharge planning  - Arrange for needed discharge resources and transportation as appropriate  - Identify discharge learning needs (meds, wound care, etc)  - Arrange for interpreters to assist at discharge as needed  - Consider post-discharge preferences of patient/family/discharge partner  - Complete POLST form as appropriate  - Assess patient's ability to be responsible for managing their own health  - Refer to Case Management Department for coordinating discharge planning if the patient needs post-hospital services based on physician/LIP order or complex needs related to functional status, cognitive ability or social support system  Outcome: Progressing

## 2024-12-12 NOTE — PROGRESS NOTES
[facility]  part of [facility]    Nephrology Progress Note    Chief Complaint   Patient presents with    Foot Pain       Subjective:   79 year old male, ESRD on HD.     Complains of pain in legs.   HD yesterday.     Review of Systems:   Review of Systems   Constitutional:  Negative for chills, fatigue and fever.   Respiratory:  Negative for cough and shortness of breath.    Cardiovascular:  Negative for chest pain and leg swelling.   Gastrointestinal:  Negative for abdominal pain, constipation, diarrhea, nausea and vomiting.   Musculoskeletal:  Positive for arthralgias.       Objective:   Temp:  [97.8 °F (36.6 °C)-99.7 °F (37.6 °C)] 99.3 °F (37.4 °C)  Pulse:  [73-80] 73  Resp:  [18-20] 20  BP: (106-142)/(48-63) 106/52  SpO2:  [91 %-98 %] 91 %  SpO2: 91 %     Intake/Output Summary (Last 24 hours) at 12/12/2024 1311  Last data filed at 12/12/2024 1230  Gross per 24 hour   Intake 2004 ml   Output 1500 ml   Net 504 ml     Wt Readings from Last 3 Encounters:   12/12/24 151 lb 7.3 oz (68.7 kg)   11/30/24 145 lb (65.8 kg)   10/22/24 132 lb 14.4 oz (60.3 kg)     Physical Exam  Constitutional:       Appearance: Normal appearance.   Cardiovascular:      Rate and Rhythm: Normal rate and regular rhythm.      Heart sounds: Normal heart sounds.      Comments: R upper arm AVF-pos bruit/thrill  Pulmonary:      Effort: Pulmonary effort is normal.      Breath sounds: Normal breath sounds.   Musculoskeletal:         General: Normal range of motion.      Comments: Neg edema   Skin:     General: Skin is warm and dry.   Neurological:      General: No focal deficit present.      Mental Status: He is alert and oriented to person, place, and time.   Psychiatric:         Mood and Affect: Mood normal.         Behavior: Behavior normal.         Medications:  Current Facility-Administered Medications   Medication Dose Route Frequency    lidocaine-menthol 4-1 % patch 2 patch  2 patch Transdermal Daily    LORazepam (Ativan)  tab 0.5 mg  0.5 mg Oral Q6H PRN    heparin (Porcine) 1000 UNIT/ML injection 1,500 Units  1.5 mL Intravenous PRN Dialysis    lidocaine-prilocaine (Emla) 2.5-2.5 % cream   Topical PRN    dextrose 10% infusion (TPN no rate)   Intravenous Continuous PRN    pancrelipase (Lip-Prot-Amyl) (Zenpep) DR particles cap 10,000 Units  10,000 Units Per G Tube PRN    And    sodium bicarbonate tab 325 mg  325 mg Oral PRN    metoprolol (Lopressor) 5 mg/5mL injection 5 mg  5 mg Intravenous PRN    Or    metoprolol (Lopressor) 5 mg/5mL injection 2.5 mg  2.5 mg Intravenous PRN    heparin (Porcine) 1000 UNIT/ML injection 1,500 Units  1.5 mL Intravenous PRN Dialysis    epoetin donna (Epogen, Procrit) 22849 UNIT/ML injection 10,000 Units  10,000 Units Subcutaneous Once per day on Monday Wednesday Friday    gabapentin (Neurontin) 250 MG/5ML oral solution 100 mg  100 mg Per G Tube BID    nortriptyline (Pamelor) cap 10 mg  10 mg Oral Nightly    HYDROmorphone (Dilaudid) 1 MG/ML injection 0.1 mg  0.1 mg Intravenous Q2H PRN    Or    HYDROmorphone (Dilaudid) 1 MG/ML injection 0.2 mg  0.2 mg Intravenous Q2H PRN    Or    HYDROmorphone (Dilaudid) 1 MG/ML injection 0.4 mg  0.4 mg Intravenous Q2H PRN    amiodarone (Pacerone) tab 200 mg  200 mg Per G Tube Daily    aspirin DR tab 81 mg  81 mg Oral Daily    atorvastatin (Lipitor) tab 40 mg  40 mg Per G Tube Nightly    carvedilol (Coreg) tab 25 mg  25 mg Per G Tube BID    [Held by provider] clopidogrel (Plavix) tab 75 mg  75 mg Oral Daily    fluticasone propionate (Flonase) 50 MCG/ACT nasal suspension 2 spray  2 spray Nasal Daily    insulin degludec (Tresiba) 100 units/mL flextouch 5 Units  5 Units Subcutaneous Nightly    lansoprazole (Prevacid Solutab) disintegrating tab 30 mg  30 mg Per G Tube QAM AC    glucose (Dex4) 15 GM/59ML oral liquid 15 g  15 g Oral Q15 Min PRN    Or    glucose (Glutose) 40% oral gel 15 g  15 g Oral Q15 Min PRN    Or    glucose-vitamin C (Dex-4) chewable tab 4 tablet  4 tablet Oral  Q15 Min PRN    Or    dextrose 50% injection 50 mL  50 mL Intravenous Q15 Min PRN    Or    glucose (Dex4) 15 GM/59ML oral liquid 30 g  30 g Oral Q15 Min PRN    Or    glucose (Glutose) 40% oral gel 30 g  30 g Oral Q15 Min PRN    Or    glucose-vitamin C (Dex-4) chewable tab 8 tablet  8 tablet Oral Q15 Min PRN    heparin (Porcine) 5000 UNIT/ML injection 5,000 Units  5,000 Units Subcutaneous Q8H STEPHANIE    acetaminophen (Tylenol Extra Strength) tab 500 mg  500 mg Oral Q4H PRN    acetaminophen (Tylenol) tab 650 mg  650 mg Oral Q4H PRN    Or    HYDROcodone-acetaminophen (Norco) 5-325 MG per tab 1 tablet  1 tablet Oral Q4H PRN    polyethylene glycol (PEG 3350) (Miralax) 17 g oral packet 17 g  17 g Oral Daily PRN    sennosides (Senokot) tab 17.2 mg  17.2 mg Oral Nightly PRN    bisacodyl (Dulcolax) 10 MG rectal suppository 10 mg  10 mg Rectal Daily PRN    insulin aspart (NovoLOG) 100 Units/mL FlexPen 1-7 Units  1-7 Units Subcutaneous TID CC and HS              Results:     Recent Labs   Lab 12/09/24  0546 12/10/24  0534 12/10/24  1709 12/11/24  0653   RBC 2.35* 2.22*  --  2.80*   HGB 7.1* 6.8* 8.3* 8.7*   HCT 22.4* 21.5*  --  27.0*   MCV 95.3 96.8  --  96.4   NEPRELIM 7.43 7.54  --  8.12*   WBC 10.4 10.1  --  11.1*   .0 244.0  --  245.0     Recent Labs   Lab 12/06/24  1359 12/07/24  0547 12/08/24  0158 12/08/24  0549 12/09/24  0546 12/10/24  0534 12/11/24  0653   * 129*  --  184* 114* 205* 156*   BUN 37* 37*  --  54* 27* 22 35*   CREATSERUM 4.04* 4.43*  --  5.29* 3.09* 2.46* 3.35*   CA 9.5 9.6  --  9.2 9.3 9.0 9.1   ALB 3.2 3.3 2.25* 3.0*  --   --   --    * 133*  --  133* 137 136 135*   K 4.0 3.9  --  3.8 3.4* 3.5 4.7   CL 92* 95*  --  94* 99 98 96*   CO2 35.0* 32.0  --  32.0 36.0* 33.0* 33.0*   ALKPHO 123* 116  --   --   --   --   --    AST 42* 32  --   --   --   --   --    ALT 38 29  --   --   --   --   --    BILT 0.2 0.3  --   --   --   --   --    TP 6.9 6.7 5.7  --   --   --   --      PTT   Date Value Ref  Range Status   08/09/2024 30.1 23.0 - 36.0 seconds Final     INR   Date Value Ref Range Status   08/09/2024 1.45 (H) 0.80 - 1.20 Final     Comment:     Therapeutic INR range for patients on warfarin:  2.0-3.0 for most medical conditions and surgical prophylaxis   2.5-3.5 for mechanical heart valves and recurrent thromboembolism    Direct thrombin inhibitors (e.g. argatroban, bivalirudin), factor Xa inhibitors (e.g. apixaban, rivaroxaban), and conditions such as coagulation factor deficiency, vitamin K deficiency, and liver disease will prolong the prothrombin time/INR.    Unfractionated heparin and low molecular weight heparin do not affect the prothrombin time/INR up to a concentration of 1 IU/mL and 1.5 IU/mL, respectively.         No results for input(s): \"BNP\" in the last 168 hours.  Recent Labs   Lab 12/08/24  0549 12/09/24  0546 12/10/24  0534 12/11/24  0653   MG 2.4 2.3  --  2.2   PHOS 2.1* 1.6* 1.0* 4.0        Recent Labs   Lab 12/07/24  0547 12/08/24  0158 12/08/24  0549 12/08/24  0549 12/09/24  0546 12/10/24  0534 12/11/24  0653   PHOS  --   --  2.1*   < > 1.6* 1.0* 4.0   ALB 3.3 2.25* 3.0*  --   --   --   --     < > = values in this interval not displayed.         Assessment and Plan:     79 year old male with past medical history of T2DM, HTN, HLD, ESRD on HD MWF via R AVF, CAD s/p PCI (5/2024), A.fib on Eliquis, HFpEF, KRAIG, BPH, history of recurrent gastrointestinal bleeding, who presented with bilateral lower extremity pain.      ESRD HD MWF:   Next HD tomorrow.      Bilateral lower extremity pain:   Work up neg so far.   On Gabapentin for neuropathy.   Pain control.      CAD/A.fib:   Cont Coreg, ASA/plavix.   Cont amiodarone.      Anemia:   Hb 8.7 today, s/p 1 unit pRBCs 12/10.   Continue EPO 10,000 units 3 times per week.    Hypophosphatemia:   Phos improved to 4 12/11.      Mirta Redman MD  12/12/2024

## 2024-12-12 NOTE — PLAN OF CARE
Problem: ALTERED NUTRIENT INTAKE - ADULT  Goal: Nutrient intake appropriate for improving, restoring or maintaining nutritional needs  Description: INTERVENTIONS:  - Assess nutritional status and recommend course of action  - Monitor labs, and treatment plans  - Recommend appropriate  vitamin/mineral supplements  -  monitor, and adjust tube feedings  based on assessed needs  Outcome: Progressing

## 2024-12-13 LAB
GLUCOSE BLDC GLUCOMTR-MCNC: 155 MG/DL (ref 70–99)
GLUCOSE BLDC GLUCOMTR-MCNC: 176 MG/DL (ref 70–99)
GLUCOSE BLDC GLUCOMTR-MCNC: 176 MG/DL (ref 70–99)
GLUCOSE BLDC GLUCOMTR-MCNC: 292 MG/DL (ref 70–99)

## 2024-12-13 PROCEDURE — 90935 HEMODIALYSIS ONE EVALUATION: CPT | Performed by: INTERNAL MEDICINE

## 2024-12-13 PROCEDURE — 99233 SBSQ HOSP IP/OBS HIGH 50: CPT | Performed by: INTERNAL MEDICINE

## 2024-12-13 RX ORDER — GABAPENTIN 300 MG/1
300 CAPSULE ORAL 2 TIMES DAILY
Status: DISCONTINUED | OUTPATIENT
Start: 2024-12-13 | End: 2024-12-23

## 2024-12-13 RX ORDER — CARVEDILOL 12.5 MG/1
12.5 TABLET ORAL 2 TIMES DAILY
Status: DISCONTINUED | OUTPATIENT
Start: 2024-12-13 | End: 2024-12-14

## 2024-12-13 RX ORDER — OXYCODONE HYDROCHLORIDE 5 MG/1
5 TABLET ORAL EVERY 4 HOURS PRN
Status: DISCONTINUED | OUTPATIENT
Start: 2024-12-13 | End: 2024-12-14

## 2024-12-13 RX ORDER — ASPIRIN 81 MG/1
81 TABLET, CHEWABLE ORAL DAILY
Status: DISCONTINUED | OUTPATIENT
Start: 2024-12-14 | End: 2024-12-23

## 2024-12-13 RX ORDER — ALBUMIN (HUMAN) 12.5 G/50ML
25 SOLUTION INTRAVENOUS
Status: DISPENSED | OUTPATIENT
Start: 2024-12-13 | End: 2024-12-15

## 2024-12-13 NOTE — CHRONIC PAIN
[facility]  Inpatient Pain Management Progress Note      Patient name: Ollie Hernández 79 year old male  : 1945  MRN: [id number]    Diagnosis:   1. Peripheral polyneuropathy    2. ESRD on hemodialysis (HCC)        Reason for Consult: LE pain    Current hospital day: Hospital Day: 9    Pain Scores: 5    Subjective:   Patient in HD this AM. Per wife patient is still having pain in his legs. MRI showing spinal stenosis worse at the L4-5 level.   Patient is pain is moderately controlled.  He states the medications that we are giving him make him sleepy but do allow him to feel better.  He still reports occasional shocks in his bilateral feet.      Currently patient is getting gabapentin 100 mg twice daily, lidocaine menthol patch, nortriptyline 10 mg nightly, acetaminophen 500 mg every 4 hours, Norco 5 mg every 4 hours with hydromorphone 0.1 to 0.2 mg every 2 as needed.    History:  Past Medical History:    Anemia    Asthma ([facility])    Back problem    BPH (benign prostatic hyperplasia)    Calculus of kidney    Cataract    Coronary atherosclerosis    Diabetes ([facility])    ESRD (end stage renal disease) on dialysis ([facility])    Essential hypertension    High blood pressure    High cholesterol    History of blood transfusion    Hyperlipidemia    Neuropathy    hands and feet    KRAIG on CPAP    Renal disorder    Sleep apnea    Visual impairment    glasses    Vocal cord paralysis, unilateral partial     Past Surgical History:   Procedure Laterality Date    Appendectomy          Back surgery      Neck/back -     Capsule endoscopy - internal referral      Cataract extraction Right 2022    PHACOEMULSIFICATION WITH POSTERIOR CHAMBER INTRAOCULAR LENS, RIGHT EYE    Cataract extraction Left 2021    PHACOEMULSIFICATION WITH POSTERIOR CHAMBER INTRAOCULAR LENS, LEFT EYE    Cath bare metal stent (bms)      Cath drug eluting stent  2024    Successful IVUS guided PCI of the circumflex with a ABIMBOLA     Colonoscopy      Colonoscopy N/A 01/25/2021    Procedure: COLONOSCOPY;  Surgeon: Michael Gautam MD;  Location: [facility] ENDO    Colonoscopy N/A 06/03/2024    Procedure: COLONOSCOPY;  Surgeon: Gabriel Saldana MD;  Location: [facility] ENDOSCOPY    Colonoscopy N/A 06/15/2024    Procedure: COLONOSCOPY;  Surgeon: Carlyn Storey MD;  Location: [facility] ENDOSCOPY    Colonoscopy N/A 07/31/2024    Procedure: COLONOSCOPY;  Surgeon: Gabriel Saldana MD;  Location: [facility] ENDOSCOPY    Dialysis procedure  02/17/2023    right internal jugular tunneled dialysis catheter placement.    Hand/finger surgery unlisted      Accidental trauma    Spine surgery procedure unlisted      Total hip arthroplasty Left 10/04/2024    Left anterior total hip arthroplasty    Upper gi endoscopy,diagnosis       Family History   Problem Relation Age of Onset    Cancer Father         Kidney    Breast Cancer Mother     Diabetes Mother     Diabetes Maternal Grandmother     Diabetes Maternal Grandfather     Heart Disorder Other         Uncle      reports that he quit smoking about 43 years ago. His smoking use included cigarettes. He started smoking about 60 years ago. He has a 17 pack-year smoking history. He has never used smokeless tobacco. He reports that he does not drink alcohol and does not use drugs.    Allergies:  Allergies[1]    Current Medications:  Scheduled Meds:   carvedilol  12.5 mg Per G Tube BID    lidocaine-menthol  2 patch Transdermal Daily    epoetin donna  10,000 Units Subcutaneous Once per day on Monday Wednesday Friday    gabapentin  100 mg Per G Tube BID    nortriptyline  10 mg Oral Nightly    amiodarone  200 mg Per G Tube Daily    aspirin  81 mg Oral Daily    atorvastatin  40 mg Per G Tube Nightly    [Held by provider] clopidogrel  75 mg Oral Daily    fluticasone propionate  2 spray Nasal Daily    insulin degludec  5 Units Subcutaneous Nightly    lansoprazole  30 mg Per G Tube QAM AC    heparin  5,000 Units Subcutaneous Q8H [facility]    insulin aspart   1-7 Units Subcutaneous TID CC and HS     Continuous Infusions:   dextrose 10%       PRN Meds:.  sodium chloride **AND** albumin human    oxyCODONE    LORazepam    heparin    lidocaine-prilocaine    dextrose 10%    lipase-protease-amylase (Lip-Prot-Amyl) **AND** sodium bicarbonate    metoprolol **OR** metoprolol    heparin    HYDROmorphone **OR** HYDROmorphone **OR** HYDROmorphone    glucose **OR** glucose **OR** glucose-vitamin C **OR** dextrose **OR** glucose **OR** glucose **OR** glucose-vitamin C    acetaminophen    acetaminophen **OR** [DISCONTINUED] HYDROcodone-acetaminophen **OR** [DISCONTINUED] HYDROcodone-acetaminophen    polyethylene glycol (PEG 3350)    sennosides    bisacodyl    Exam:Blood pressure 107/48, pulse 76, temperature 98.3 °F (36.8 °C), temperature source Oral, resp. rate 18, height 5' 4\" (1.626 m), weight 150 lb 5.7 oz (68.2 kg), SpO2 97%.    Imaging:   Lumbar MRI  CONCLUSION:      1. Multilevel degenerative changes of the lumbar spine, which are described in detail above and are similar when compared to 02/07/2023.   2. At L4-L5, there is severe canal stenosis, severe bilateral subarticular zone stenosis, and moderate to severe bilateral foraminal narrowing.   3. Redemonstrated postoperative changes from prior decompressive laminectomies at L2-L4.   4. Grade 1 anterolisthesis of L4 on L5.   5. No acute fracture or traumatic listhesis of the lumbar spine.   6. Multifocal abnormal increased T2/STIR signal involving the paraspinal musculature, which may reflect atrophy, denervation, or less likely reflect myositis.   7. Circumferential bladder wall thickening, which may be secondary to underdistention, cystitis, or chronic outlet obstruction.   8. Lesser incidental findings described above.          Assessment:  79 year old male with past medical history of T2DM, HTN, HLD, ESRD on HD MWF via R AVF, CAD s/p PCI (5/2024), A.fib on AC, HFpEF, KRAIG, BPH, history of recurrent gastrointestinal bleeding,  who presented with bilateral lower extremity pain.    Pain management was consulted for bilateral lower extremities pain in the setting of peripheral neuropathy. MRI was also done showing severe canal stenosis at L4-5. Patient pain is likely multifactorial from both diabetic neuropathy and spinal stenosis. History is more indicative of peripheral neuropathy pain.     Currently patient states his pain is moderately controlled on his current regimen.  He states the [place] helps his pain however it does make him tired.  He is on low-dose gabapentin given his renal failure.  He is also on nortriptyline 10 mg nightly.    Plan:  - No plan for WILLIAM at this time. Patient would need to have plavix held for 7 days (has been held since 12/10). Patient can follow outpatient to discuss injection.   -Continue pain medications as patient states his pain is controlled at this time.  -Continue with Norco 5 mg as needed  -Continue low-dose gabapentin as well as nortriptyline. Can increase night time gabapentin given that most pain is at night In the setting of ESRD   -Continue lidocaine patch    Vince Kline MD   Total Time: 15 minutes     Lengthy discussion of risks, benefits, and alternative treatments were reviewed to patients satisfaction. All questions were answered. Patient agreeable to plan. Greater than 50% of the time was spent with counseling (nature of discussion centered around pain, therapy, and treatment options), face to face time, time spent reviewing data, obtaining patient information and discussing the care with the patients health care providers.     Chronic Pain Service [phone]       [1]   Allergies  Allergen Reactions    Adhesive Tape OTHER (SEE COMMENTS)     Severe rashes    Dust Mite Extract RASH

## 2024-12-13 NOTE — PROGRESS NOTES
[facility]  part of [facility]    Progress Note    Ollie Hernández Patient Status:  Inpatient    1945 MRN [id number]   Location [facility] 5SW/SE Attending Dee Joyce,*   Hosp Day # 7 PCP Wili Parmar MD     Chief Complaint: foot pain    Subjective:   Family at bedside, patient still in significant pain per family.  Appears appears calm at this time.       Objective:   Blood pressure 121/45, pulse 79, temperature 99.4 °F (37.4 °C), temperature source Oral, resp. rate 20, height 5' 4\" (1.626 m), weight 150 lb 5.7 oz (68.2 kg), SpO2 91%.  Physical Exam  Vitals and nursing note reviewed.   Constitutional:       General: He is not in acute distress.     Appearance: He is ill-appearing. He is not toxic-appearing or diaphoretic.   Cardiovascular:      Rate and Rhythm: Normal rate.      Pulses: Normal pulses.   Pulmonary:      Breath sounds: Rhonchi present. No wheezing.   Abdominal:      General: Bowel sounds are normal.      Palpations: Abdomen is soft.   Skin:     General: Skin is warm and dry.      Capillary Refill: Capillary refill takes less than 2 seconds.   Neurological:      General: No focal deficit present.      Mental Status: He is alert.   Psychiatric:         Behavior: Behavior normal.         Results:   Lab Results   Component Value Date    WBC 11.1 (H) 2024    HGB 8.7 (L) 2024    HCT 27.0 (L) 2024    .0 2024    CREATSERUM 3.35 (H) 2024    BUN 35 (H) 2024     (L) 2024    K 4.7 2024    CL 96 (L) 2024    CO2 33.0 (H) 2024     (H) 2024    CA 9.1 2024    ALB 3.0 (L) 2024    ALKPHO 116 2024    BILT 0.3 2024    TP 5.7 2024    AST 32 2024    ALT 29 2024    PTT 30.1 2024    INR 1.45 (H) 2024    T4F 0.9 2022    TSH 2.844 2024     (H) 2024    PSA 2.94 10/20/2021    DDIMER 6.07 (H) 2024    ESRML 10  06/16/2024    CRP 1.30 (H) 06/16/2024    MG 2.2 12/11/2024    PHOS 4.0 12/11/2024    TROP <0.045 07/25/2019    TROPHS 52 12/03/2024     08/05/2023    B12 672 07/04/2024       No results found.        Assessment & Plan:         Peripheral polyneuropathy; checked rpr ok; thyroid; electropheresis; b12 ok 7/24  -Patient p/w bilateral lower extremity pain  -Significantly worse over the past 3 to 4 days.  -No real improvement with gabapentin at home  -Difficulties with ambulation because of pain  -Pain control as able; pt screaming in pain or asleep; pain clinic saw; await mri  poss steroid   -PT/OT  -Continue to monitor  -MRI results reviewed   -12/13: Per Dr. Kline not good candidate for WILLIAM.  Will increase narcotics from Norco to oxycodone for more relief.  IV as needed Dilaudid; monitor for oversedation consider Zanaflex as needed as well pain     ESRD on HD  -Normally receives dialysis on Monday Wednesday Friday.  Last session was on Wednesday.  -Nephrology consulted, further HD per nephrology.     Anemia of chronic renal disease and iron defy checked stool for blood, neg;  -Likely secondary to ESRD as above  -Hemoglobin noted to be 7.1  -Continue to monitor     Type 2 DM:  - Monitoring Blood glucose with POC checks  - SSI to cover hyperglycemia  - Hypoglycemia protocol  - Will monitor and adjust agents as needed.      -KRAIG  -Hypertension  -Chronic dCHF  -Pulm HTN    Afib   has watchman dr romano put in in 9/2024; mri ok with watchman per dr coffey  12/13: Patient had episode of asymptomatic transient RVR self-limited.         Cad with left circ stent 5/24 poss need steroid inj; will hold and notify cards dr gale    Patient will require inpatient services that will reasonably be expected to span two midnight's based on the clinical documentation in H+P.   Based on patients current state of illness, I anticipate that, after discharge, patient will require TBD.  Complex mdm; coordinating care with  nurse/duly cards and counseling pt and with his permission his wife on phone about labs/neuropathy/mri/plavix/stent

## 2024-12-13 NOTE — PLAN OF CARE
Problem: Patient Centered Care  Goal: Patient preferences are identified and integrated in the patient's plan of care  Description: Interventions:  - What would you like us to know as we care for you? I am from home with my wife  - Provide timely, complete, and accurate information to patient/family  - Incorporate patient and family knowledge, values, beliefs, and cultural backgrounds into the planning and delivery of care  - Encourage patient/family to participate in care and decision-making at the level they choose  - Honor patient and family perspectives and choices  Outcome: Progressing     Problem: Diabetes/Glucose Control  Goal: Glucose maintained within prescribed range  Description: INTERVENTIONS:  - Monitor Blood Glucose as ordered  - Assess for signs and symptoms of hyperglycemia and hypoglycemia  - Administer ordered medications to maintain glucose within target range  - Assess barriers to adequate nutritional intake and initiate nutrition consult as needed  - Instruct patient on self management of diabetes  Outcome: Progressing     Problem: Patient/Family Goals  Goal: Patient/Family Long Term Goal  Description: Patient's Long Term Goal: Discharge    Interventions:  - Monitor vitals  - Monitor appropriate labs  - Administer medications as ordered  - Follow MD's orders  - Update patient on plan of care   - Discharge planning     - See additional Care Plan goals for specific interventions  Outcome: Progressing  Goal: Patient/Family Short Term Goal  Description: Patient's Short Term Goal:     Interventions:   -   - See additional Care Plan goals for specific interventions  Outcome: Progressing     Problem: MUSCULOSKELETAL - ADULT  Goal: Return mobility to safest level of function  Description: INTERVENTIONS:  - Assess patient stability and activity tolerance for standing, transferring and ambulating w/ or w/o assistive devices  - Assist with transfers and ambulation using safe patient handling equipment as  needed  - Ensure adequate protection for wounds/incisions during mobilization  - Obtain PT/OT consults as needed  - Advance activity as appropriate  - Communicate ordered activity level and limitations with patient/family  Outcome: Progressing     Problem: PAIN - ADULT  Goal: Verbalizes/displays adequate comfort level or patient's stated pain goal  Description: INTERVENTIONS:  - Encourage pt to monitor pain and request assistance  - Assess pain using appropriate pain scale  - Administer analgesics based on type and severity of pain and evaluate response  - Implement non-pharmacological measures as appropriate and evaluate response  - Consider cultural and social influences on pain and pain management  - Manage/alleviate anxiety  - Utilize distraction and/or relaxation techniques  - Monitor for opioid side effects  - Notify MD/LIP if interventions unsuccessful or patient reports new pain  - Anticipate increased pain with activity and pre-medicate as appropriate  Outcome: Progressing     Problem: RISK FOR INFECTION - ADULT  Goal: Absence of fever/infection during anticipated neutropenic period  Description: INTERVENTIONS  - Monitor WBC  - Administer growth factors as ordered  - Implement neutropenic guidelines  Outcome: Progressing     Problem: SAFETY ADULT - FALL  Goal: Free from fall injury  Description: INTERVENTIONS:  - Assess pt frequently for physical needs  - Identify cognitive and physical deficits and behaviors that affect risk of falls.  - [place] fall precautions as indicated by assessment.  - Educate pt/family on patient safety including physical limitations  - Instruct pt to call for assistance with activity based on assessment  - Modify environment to reduce risk of injury  - Provide assistive devices as appropriate  - Consider OT/PT consult to assist with strengthening/mobility  - Encourage toileting schedule  Outcome: Progressing     Problem: DISCHARGE PLANNING  Goal: Discharge to home or other  facility with appropriate resources  Description: INTERVENTIONS:  - Identify barriers to discharge w/pt and caregiver  - Include patient/family/discharge partner in discharge planning  - Arrange for needed discharge resources and transportation as appropriate  - Identify discharge learning needs (meds, wound care, etc)  - Arrange for interpreters to assist at discharge as needed  - Consider post-discharge preferences of patient/family/discharge partner  - Complete POLST form as appropriate  - Assess patient's ability to be responsible for managing their own health  - Refer to Case Management Department for coordinating discharge planning if the patient needs post-hospital services based on physician/LIP order or complex needs related to functional status, cognitive ability or social support system  Outcome: Progressing

## 2024-12-13 NOTE — PROGRESS NOTES
[facility]  part of formerly [facility]    Nephrology Progress Note    Chief Complaint   Patient presents with    Foot Pain       Subjective:   79 year old male, ESRD on HD.     Pain in legs not improved much.   Seen and examined during dialysis. Hypotensive during dialysis.     Review of Systems:   Review of Systems   Constitutional:  Negative for chills, fatigue and fever.   Respiratory:  Negative for cough and shortness of breath.    Cardiovascular:  Negative for chest pain and leg swelling.   Gastrointestinal:  Negative for abdominal pain, constipation, diarrhea, nausea and vomiting.   Musculoskeletal:  Positive for arthralgias.       Objective:   Temp:  [98.4 °F (36.9 °C)-99.6 °F (37.6 °C)] 98.4 °F (36.9 °C)  Pulse:  [71-80] 71  Resp:  [18-20] 18  BP: (106-121)/(45-59) 119/59  SpO2:  [91 %-94 %] 94 %  SpO2: 94 %     Intake/Output Summary (Last 24 hours) at 12/13/2024 0929  Last data filed at 12/13/2024 0820  Gross per 24 hour   Intake 1826 ml   Output 160 ml   Net 1666 ml     Wt Readings from Last 3 Encounters:   12/13/24 150 lb 5.7 oz (68.2 kg)   11/30/24 145 lb (65.8 kg)   10/22/24 132 lb 14.4 oz (60.3 kg)     Physical Exam  Constitutional:       Appearance: Normal appearance.   Cardiovascular:      Rate and Rhythm: Normal rate and regular rhythm.      Heart sounds: Normal heart sounds.      Comments: R upper arm AVF-pos bruit/thrill  Pulmonary:      Effort: Pulmonary effort is normal.      Breath sounds: Normal breath sounds.   Musculoskeletal:         General: Normal range of motion.      Comments: Neg edema   Skin:     General: Skin is warm and dry.   Neurological:      General: No focal deficit present.      Mental Status: He is alert and oriented to person, place, and time.   Psychiatric:         Mood and Affect: Mood normal.         Behavior: Behavior normal.         Medications:  Current Facility-Administered Medications   Medication Dose Route Frequency    lidocaine-menthol 4-1 % patch 2  patch  2 patch Transdermal Daily    LORazepam (Ativan) tab 0.5 mg  0.5 mg Oral Q6H PRN    heparin (Porcine) 1000 UNIT/ML injection 1,500 Units  1.5 mL Intravenous PRN Dialysis    lidocaine-prilocaine (Emla) 2.5-2.5 % cream   Topical PRN    dextrose 10% infusion (TPN no rate)   Intravenous Continuous PRN    pancrelipase (Lip-Prot-Amyl) (Zenpep) DR particles cap 10,000 Units  10,000 Units Per G Tube PRN    And    sodium bicarbonate tab 325 mg  325 mg Oral PRN    metoprolol (Lopressor) 5 mg/5mL injection 5 mg  5 mg Intravenous PRN    Or    metoprolol (Lopressor) 5 mg/5mL injection 2.5 mg  2.5 mg Intravenous PRN    heparin (Porcine) 1000 UNIT/ML injection 1,500 Units  1.5 mL Intravenous PRN Dialysis    epoetin donna (Epogen, Procrit) 36368 UNIT/ML injection 10,000 Units  10,000 Units Subcutaneous Once per day on Monday Wednesday Friday    gabapentin (Neurontin) 250 MG/5ML oral solution 100 mg  100 mg Per G Tube BID    nortriptyline (Pamelor) cap 10 mg  10 mg Oral Nightly    HYDROmorphone (Dilaudid) 1 MG/ML injection 0.1 mg  0.1 mg Intravenous Q2H PRN    Or    HYDROmorphone (Dilaudid) 1 MG/ML injection 0.2 mg  0.2 mg Intravenous Q2H PRN    Or    HYDROmorphone (Dilaudid) 1 MG/ML injection 0.4 mg  0.4 mg Intravenous Q2H PRN    amiodarone (Pacerone) tab 200 mg  200 mg Per G Tube Daily    aspirin DR tab 81 mg  81 mg Oral Daily    atorvastatin (Lipitor) tab 40 mg  40 mg Per G Tube Nightly    carvedilol (Coreg) tab 25 mg  25 mg Per G Tube BID    [Held by provider] clopidogrel (Plavix) tab 75 mg  75 mg Oral Daily    fluticasone propionate (Flonase) 50 MCG/ACT nasal suspension 2 spray  2 spray Nasal Daily    insulin degludec (Tresiba) 100 units/mL flextouch 5 Units  5 Units Subcutaneous Nightly    lansoprazole (Prevacid Solutab) disintegrating tab 30 mg  30 mg Per G Tube QAM AC    glucose (Dex4) 15 GM/59ML oral liquid 15 g  15 g Oral Q15 Min PRN    Or    glucose (Glutose) 40% oral gel 15 g  15 g Oral Q15 Min PRN    Or     glucose-vitamin C (Dex-4) chewable tab 4 tablet  4 tablet Oral Q15 Min PRN    Or    dextrose 50% injection 50 mL  50 mL Intravenous Q15 Min PRN    Or    glucose (Dex4) 15 GM/59ML oral liquid 30 g  30 g Oral Q15 Min PRN    Or    glucose (Glutose) 40% oral gel 30 g  30 g Oral Q15 Min PRN    Or    glucose-vitamin C (Dex-4) chewable tab 8 tablet  8 tablet Oral Q15 Min PRN    heparin (Porcine) 5000 UNIT/ML injection 5,000 Units  5,000 Units Subcutaneous Q8H STEPHANIE    acetaminophen (Tylenol Extra Strength) tab 500 mg  500 mg Oral Q4H PRN    acetaminophen (Tylenol) tab 650 mg  650 mg Oral Q4H PRN    Or    HYDROcodone-acetaminophen (Norco) 5-325 MG per tab 1 tablet  1 tablet Oral Q4H PRN    polyethylene glycol (PEG 3350) (Miralax) 17 g oral packet 17 g  17 g Oral Daily PRN    sennosides (Senokot) tab 17.2 mg  17.2 mg Oral Nightly PRN    bisacodyl (Dulcolax) 10 MG rectal suppository 10 mg  10 mg Rectal Daily PRN    insulin aspart (NovoLOG) 100 Units/mL FlexPen 1-7 Units  1-7 Units Subcutaneous TID CC and HS              Results:     Recent Labs   Lab 12/09/24  0546 12/10/24  0534 12/10/24  1709 12/11/24  0653   RBC 2.35* 2.22*  --  2.80*   HGB 7.1* 6.8* 8.3* 8.7*   HCT 22.4* 21.5*  --  27.0*   MCV 95.3 96.8  --  96.4   NEPRELIM 7.43 7.54  --  8.12*   WBC 10.4 10.1  --  11.1*   .0 244.0  --  245.0     Recent Labs   Lab 12/06/24  1359 12/07/24  0547 12/08/24  0158 12/08/24  0549 12/09/24  0546 12/10/24  0534 12/11/24  0653   * 129*  --  184* 114* 205* 156*   BUN 37* 37*  --  54* 27* 22 35*   CREATSERUM 4.04* 4.43*  --  5.29* 3.09* 2.46* 3.35*   CA 9.5 9.6  --  9.2 9.3 9.0 9.1   ALB 3.2 3.3 2.25* 3.0*  --   --   --    * 133*  --  133* 137 136 135*   K 4.0 3.9  --  3.8 3.4* 3.5 4.7   CL 92* 95*  --  94* 99 98 96*   CO2 35.0* 32.0  --  32.0 36.0* 33.0* 33.0*   ALKPHO 123* 116  --   --   --   --   --    AST 42* 32  --   --   --   --   --    ALT 38 29  --   --   --   --   --    BILT 0.2 0.3  --   --   --   --   --     TP 6.9 6.7 5.7  --   --   --   --      PTT   Date Value Ref Range Status   08/09/2024 30.1 23.0 - 36.0 seconds Final     INR   Date Value Ref Range Status   08/09/2024 1.45 (H) 0.80 - 1.20 Final     Comment:     Therapeutic INR range for patients on warfarin:  2.0-3.0 for most medical conditions and surgical prophylaxis   2.5-3.5 for mechanical heart valves and recurrent thromboembolism    Direct thrombin inhibitors (e.g. argatroban, bivalirudin), factor Xa inhibitors (e.g. apixaban, rivaroxaban), and conditions such as coagulation factor deficiency, vitamin K deficiency, and liver disease will prolong the prothrombin time/INR.    Unfractionated heparin and low molecular weight heparin do not affect the prothrombin time/INR up to a concentration of 1 IU/mL and 1.5 IU/mL, respectively.         No results for input(s): \"BNP\" in the last 168 hours.  Recent Labs   Lab 12/08/24  0549 12/09/24  0546 12/10/24  0534 12/11/24  0653   MG 2.4 2.3  --  2.2   PHOS 2.1* 1.6* 1.0* 4.0        Recent Labs   Lab 12/07/24  0547 12/08/24  0158 12/08/24  0549 12/08/24  0549 12/09/24  0546 12/10/24  0534 12/11/24  0653   PHOS  --   --  2.1*   < > 1.6* 1.0* 4.0   ALB 3.3 2.25* 3.0*  --   --   --   --     < > = values in this interval not displayed.         Assessment and Plan:     79 year old male with past medical history of T2DM, HTN, HLD, ESRD on HD MWF via R AVF, CAD s/p PCI (5/2024), A.fib on Eliquis, HFpEF, KRAIG, BPH, history of recurrent gastrointestinal bleeding, who presented with bilateral lower extremity pain.      ESRD HD MWF:   HD today.      Bilateral lower extremity pain:   Work up neg so far.   On Gabapentin for neuropathy.   Pain control.      CAD/A.fib:   On ASA/plavix and amiodarone.   Hypotensive, will decrease Coreg to 12.5 mg BID.      Anemia:   Hb 8.7 today, s/p 1 unit pRBCs 12/10.   Continue EPO 10,000 units 3 times per week.    Hypophosphatemia:   Phos improved to 4 12/11.      Mirta Redman MD  12/13/2024

## 2024-12-13 NOTE — CM/SW NOTE
Pt has insurance auth approval to go to [facility]. However, patient needs to be 24 hours free of IV pain medications.     Plan: Pending medical clearance, DC to [facility], *24 hours free of IV pain meds, Ambulance on will call from 12/12-12/16/2024, *PCS needs a date    / to remain available for support and/or discharge planning.     Annette Shannon, MSW, LSW   x [phone]

## 2024-12-13 NOTE — CDS QUERY
Dear Dr Rey,  Can clinical significance of results of MRI Spine Lumbar be clarified?  Clinical significance diagnosis - please select ( ) L4-L5 spinal stenosis ( ) Multilevel degenrative changes which are most advance at L4-L5 ( ) anterolisthesis of L4 on L5 ( ) other please specify:_______________________________  ( x) Finding clinically insignificance  ( ) other- please clarify: __________________________________________    Clinical indicators  MRI Report: Multilevel degenerative changes of the lumbar spine, multilevel ventral disc osteophyte complexes  L4-L5, there is severe canal stenosis, severe bilateral subarticular zone stenosis, moderate to severe bilateral foraminal narrowing.  Grade 1 anterolisthesis of L4 on L5.    Progress note 12/12: Peripheral polyneuropathy; - bilateral lower extremity pain- pt screaming in pain or asleep  -MRI results reviewed awaiting further recommendations from pain service for possible WILLIAM  Risk factors: advance age, DM, HTN ESRD on HD  Treatment: Gabapentin, PT/OT, MRI L spine , pain consultation    Use of terms such as suspected, possible, or probable (associated with a specific diagnosis that is being evaluated, monitored, or treated as if it exists) are acceptable and can be coded in the inpatient setting, when documented at the time of discharge.     Please add any additional documentation to your progress note and continue to document this through discharge.      If you have any questions, please contact Clinical Documentation  Specialist:  YOBANI Arias at      Thank You!     THIS FORM IS A PERMANENT PART OF THE MEDICAL RECORD

## 2024-12-13 NOTE — PLAN OF CARE
Problem: Patient Centered Care  Goal: Patient preferences are identified and integrated in the patient's plan of care  Description: Interventions:  - What would you like us to know as we care for you? I am from home with my wife  - Provide timely, complete, and accurate information to patient/family  - Incorporate patient and family knowledge, values, beliefs, and cultural backgrounds into the planning and delivery of care  - Encourage patient/family to participate in care and decision-making at the level they choose  - Honor patient and family perspectives and choices  Outcome: Progressing     Problem: Diabetes/Glucose Control  Goal: Glucose maintained within prescribed range  Description: INTERVENTIONS:  - Monitor Blood Glucose as ordered  - Assess for signs and symptoms of hyperglycemia and hypoglycemia  - Administer ordered medications to maintain glucose within target range  - Assess barriers to adequate nutritional intake and initiate nutrition consult as needed  - Instruct patient on self management of diabetes  Outcome: Progressing     Problem: Patient/Family Goals  Goal: Patient/Family Long Term Goal  Description: Patient's Long Term Goal: Discharge    Interventions:  - Monitor vitals  - Monitor appropriate labs  - Administer medications as ordered  - Follow MD's orders  - Update patient on plan of care   - Discharge planning     - See additional Care Plan goals for specific interventions  Outcome: Progressing  Goal: Patient/Family Short Term Goal  Description: Patient's Short Term Goal:     Interventions:   -   - See additional Care Plan goals for specific interventions  Outcome: Progressing     Problem: MUSCULOSKELETAL - ADULT  Goal: Return mobility to safest level of function  Description: INTERVENTIONS:  - Assess patient stability and activity tolerance for standing, transferring and ambulating w/ or w/o assistive devices  - Assist with transfers and ambulation using safe patient handling equipment as  needed  - Ensure adequate protection for wounds/incisions during mobilization  - Obtain PT/OT consults as needed  - Advance activity as appropriate  - Communicate ordered activity level and limitations with patient/family  Outcome: Progressing     Problem: PAIN - ADULT  Goal: Verbalizes/displays adequate comfort level or patient's stated pain goal  Description: INTERVENTIONS:  - Encourage pt to monitor pain and request assistance  - Assess pain using appropriate pain scale  - Administer analgesics based on type and severity of pain and evaluate response  - Implement non-pharmacological measures as appropriate and evaluate response  - Consider cultural and social influences on pain and pain management  - Manage/alleviate anxiety  - Utilize distraction and/or relaxation techniques  - Monitor for opioid side effects  - Notify MD/LIP if interventions unsuccessful or patient reports new pain  - Anticipate increased pain with activity and pre-medicate as appropriate  Outcome: Progressing     Problem: RISK FOR INFECTION - ADULT  Goal: Absence of fever/infection during anticipated neutropenic period  Description: INTERVENTIONS  - Monitor WBC  - Administer growth factors as ordered  - Implement neutropenic guidelines  Outcome: Progressing     Problem: SAFETY ADULT - FALL  Goal: Free from fall injury  Description: INTERVENTIONS:  - Assess pt frequently for physical needs  - Identify cognitive and physical deficits and behaviors that affect risk of falls.  - [place] fall precautions as indicated by assessment.  - Educate pt/family on patient safety including physical limitations  - Instruct pt to call for assistance with activity based on assessment  - Modify environment to reduce risk of injury  - Provide assistive devices as appropriate  - Consider OT/PT consult to assist with strengthening/mobility  - Encourage toileting schedule  Outcome: Progressing     Problem: DISCHARGE PLANNING  Goal: Discharge to home or other  facility with appropriate resources  Description: INTERVENTIONS:  - Identify barriers to discharge w/pt and caregiver  - Include patient/family/discharge partner in discharge planning  - Arrange for needed discharge resources and transportation as appropriate  - Identify discharge learning needs (meds, wound care, etc)  - Arrange for interpreters to assist at discharge as needed  - Consider post-discharge preferences of patient/family/discharge partner  - Complete POLST form as appropriate  - Assess patient's ability to be responsible for managing their own health  - Refer to Case Management Department for coordinating discharge planning if the patient needs post-hospital services based on physician/LIP order or complex needs related to functional status, cognitive ability or social support system  Outcome: Progressing     Patient is presently resting in the bed. Alert x 4. Vital signs taken and stable. Turn and repositioned every 2 hours. Kept clean and dry. Prompt care is given to incontinence. Patient is medicated as needed for pain or discomfort. Patient is NPO. Patient receives bolus tube feedings by G-tube per doctor orders. Call light within reach at all times. Fall risk precautions are followed at all times. Bed alarm in use at all times.

## 2024-12-13 NOTE — PLAN OF CARE
Patient received in bed from hemodialysis treatment. Vital signs taken and stable upon return from hemodialysis treatment. No complaints of pain or discomfort. Call light within reach at all times. Spouse present at bedside.

## 2024-12-14 ENCOUNTER — APPOINTMENT (OUTPATIENT)
Dept: CT IMAGING | Facility: HOSPITAL | Age: 79
End: 2024-12-14
Attending: INTERNAL MEDICINE
Payer: MEDICARE

## 2024-12-14 ENCOUNTER — APPOINTMENT (OUTPATIENT)
Dept: GENERAL RADIOLOGY | Facility: HOSPITAL | Age: 79
End: 2024-12-14
Attending: INTERNAL MEDICINE
Payer: MEDICARE

## 2024-12-14 LAB
ANION GAP SERPL CALC-SCNC: 5 MMOL/L (ref 0–18)
BASE EXCESS BLD CALC-SCNC: 8.3 MMOL/L (ref ?–2)
BASOPHILS # BLD AUTO: 0.03 X10(3) UL (ref 0–0.2)
BASOPHILS NFR BLD AUTO: 0.3 %
BUN BLD-MCNC: 40 MG/DL (ref 9–23)
BUN/CREAT SERPL: 13.7 (ref 10–20)
CALCIUM BLD-MCNC: 9.6 MG/DL (ref 8.7–10.4)
CHLORIDE SERPL-SCNC: 100 MMOL/L (ref 98–112)
CO2 SERPL-SCNC: 32 MMOL/L (ref 21–32)
CREAT BLD-MCNC: 2.92 MG/DL
DEPRECATED RDW RBC AUTO: 63.9 FL (ref 35.1–46.3)
EGFRCR SERPLBLD CKD-EPI 2021: 21 ML/MIN/1.73M2 (ref 60–?)
EOSINOPHIL # BLD AUTO: 0.14 X10(3) UL (ref 0–0.7)
EOSINOPHIL NFR BLD AUTO: 1.2 %
ERYTHROCYTE [DISTWIDTH] IN BLOOD BY AUTOMATED COUNT: 18.4 % (ref 11–15)
GLUCOSE BLD-MCNC: 168 MG/DL (ref 70–99)
GLUCOSE BLDC GLUCOMTR-MCNC: 180 MG/DL (ref 70–99)
GLUCOSE BLDC GLUCOMTR-MCNC: 215 MG/DL (ref 70–99)
HCO3 BLDA-SCNC: 31.4 MEQ/L (ref 21–27)
HCT VFR BLD AUTO: 25.4 %
HGB BLD-MCNC: 8 G/DL
IMM GRANULOCYTES # BLD AUTO: 0.1 X10(3) UL (ref 0–1)
IMM GRANULOCYTES NFR BLD: 0.9 %
LYMPHOCYTES # BLD AUTO: 1.53 X10(3) UL (ref 1–4)
LYMPHOCYTES NFR BLD AUTO: 13.3 %
MCH RBC QN AUTO: 30.9 PG (ref 26–34)
MCHC RBC AUTO-ENTMCNC: 31.5 G/DL (ref 31–37)
MCV RBC AUTO: 98.1 FL
MODIFIED ALLEN TEST: POSITIVE
MONOCYTES # BLD AUTO: 1.03 X10(3) UL (ref 0.1–1)
MONOCYTES NFR BLD AUTO: 9 %
NEUTROPHILS # BLD AUTO: 8.67 X10 (3) UL (ref 1.5–7.7)
NEUTROPHILS # BLD AUTO: 8.67 X10(3) UL (ref 1.5–7.7)
NEUTROPHILS NFR BLD AUTO: 75.3 %
O2 CT BLD-SCNC: 10.4 VOL% (ref 15–23)
OSMOLALITY SERPL CALC.SUM OF ELEC: 298 MOSM/KG (ref 275–295)
OXYGEN LITERS/MINUTE: 2 L/MIN
PCO2 BLDA: 45 MM HG (ref 35–45)
PH BLDA: 7.47 [PH] (ref 7.35–7.45)
PLATELET # BLD AUTO: 244 10(3)UL (ref 150–450)
PO2 BLDA: 81 MM HG (ref 80–100)
POTASSIUM SERPL-SCNC: 5 MMOL/L (ref 3.5–5.1)
PUNCTURE CHARGE: YES
RBC # BLD AUTO: 2.59 X10(6)UL
SAO2 % BLDA: 97.3 % (ref 94–100)
SODIUM SERPL-SCNC: 137 MMOL/L (ref 136–145)
WBC # BLD AUTO: 11.5 X10(3) UL (ref 4–11)

## 2024-12-14 PROCEDURE — 99233 SBSQ HOSP IP/OBS HIGH 50: CPT | Performed by: INTERNAL MEDICINE

## 2024-12-14 PROCEDURE — 99232 SBSQ HOSP IP/OBS MODERATE 35: CPT | Performed by: STUDENT IN AN ORGANIZED HEALTH CARE EDUCATION/TRAINING PROGRAM

## 2024-12-14 PROCEDURE — 70450 CT HEAD/BRAIN W/O DYE: CPT | Performed by: INTERNAL MEDICINE

## 2024-12-14 PROCEDURE — 71045 X-RAY EXAM CHEST 1 VIEW: CPT | Performed by: INTERNAL MEDICINE

## 2024-12-14 PROCEDURE — 99232 SBSQ HOSP IP/OBS MODERATE 35: CPT | Performed by: INTERNAL MEDICINE

## 2024-12-14 RX ORDER — METRONIDAZOLE 500 MG/100ML
500 INJECTION, SOLUTION INTRAVENOUS EVERY 12 HOURS
Status: DISCONTINUED | OUTPATIENT
Start: 2024-12-14 | End: 2024-12-14

## 2024-12-14 RX ORDER — CARVEDILOL 6.25 MG/1
6.25 TABLET ORAL 2 TIMES DAILY
Status: DISCONTINUED | OUTPATIENT
Start: 2024-12-14 | End: 2024-12-15

## 2024-12-14 NOTE — PLAN OF CARE
Problem: Patient Centered Care  Goal: Patient preferences are identified and integrated in the patient's plan of care  Description: Interventions:  - What would you like us to know as we care for you? I am from home with my wife  - Provide timely, complete, and accurate information to patient/family  - Incorporate patient and family knowledge, values, beliefs, and cultural backgrounds into the planning and delivery of care  - Encourage patient/family to participate in care and decision-making at the level they choose  - Honor patient and family perspectives and choices  Outcome: Progressing     Problem: Diabetes/Glucose Control  Goal: Glucose maintained within prescribed range  Description: INTERVENTIONS:  - Monitor Blood Glucose as ordered  - Assess for signs and symptoms of hyperglycemia and hypoglycemia  - Administer ordered medications to maintain glucose within target range  - Assess barriers to adequate nutritional intake and initiate nutrition consult as needed  - Instruct patient on self management of diabetes  Outcome: Progressing     Problem: Patient/Family Goals  Goal: Patient/Family Long Term Goal  Description: Patient's Long Term Goal: Discharge    Interventions:  - Monitor vitals  - Monitor appropriate labs  - Administer medications as ordered  - Follow MD's orders  - Update patient on plan of care   - Discharge planning     - See additional Care Plan goals for specific interventions  Outcome: Progressing  Goal: Patient/Family Short Term Goal  Description: Patient's Short Term Goal:     Interventions:   -   - See additional Care Plan goals for specific interventions  Outcome: Progressing     Problem: MUSCULOSKELETAL - ADULT  Goal: Return mobility to safest level of function  Description: INTERVENTIONS:  - Assess patient stability and activity tolerance for standing, transferring and ambulating w/ or w/o assistive devices  - Assist with transfers and ambulation using safe patient handling equipment as  needed  - Ensure adequate protection for wounds/incisions during mobilization  - Obtain PT/OT consults as needed  - Advance activity as appropriate  - Communicate ordered activity level and limitations with patient/family  Outcome: Progressing     Problem: METABOLIC/FLUID AND ELECTROLYTES - ADULT  Goal: Glucose maintained within prescribed range  Description: INTERVENTIONS:  - Monitor Blood Glucose as ordered  - Assess for signs and symptoms of hyperglycemia and hypoglycemia  - Administer ordered medications to maintain glucose within target range  - Assess barriers to adequate nutritional intake and initiate nutrition consult as needed  - Instruct patient on self management of diabetes  Outcome: Progressing  Goal: Electrolytes maintained within normal limits  Description: INTERVENTIONS:  - Monitor labs and rhythm and assess patient for signs and symptoms of electrolyte imbalances  - Administer electrolyte replacement as ordered  - Monitor response to electrolyte replacements, including rhythm and repeat lab results as appropriate  - Fluid restriction as ordered  - Instruct patient on fluid and nutrition restrictions as appropriate  Outcome: Progressing  Goal: Hemodynamic stability and optimal renal function maintained  Description: INTERVENTIONS:  - Monitor labs and assess for signs and symptoms of volume excess or deficit  - Monitor intake, output and patient weight  - Monitor urine specific gravity, serum osmolarity and serum sodium as indicated or ordered  - Monitor response to interventions for patient's volume status, including labs, urine output, blood pressure (other measures as available)  - Encourage oral intake as appropriate  - Instruct patient on fluid and nutrition restrictions as appropriate  Outcome: Progressing     Problem: PAIN - ADULT  Goal: Verbalizes/displays adequate comfort level or patient's stated pain goal  Description: INTERVENTIONS:  - Encourage pt to monitor pain and request  assistance  - Assess pain using appropriate pain scale  - Administer analgesics based on type and severity of pain and evaluate response  - Implement non-pharmacological measures as appropriate and evaluate response  - Consider cultural and social influences on pain and pain management  - Manage/alleviate anxiety  - Utilize distraction and/or relaxation techniques  - Monitor for opioid side effects  - Notify MD/LIP if interventions unsuccessful or patient reports new pain  - Anticipate increased pain with activity and pre-medicate as appropriate  Outcome: Progressing     Problem: RISK FOR INFECTION - ADULT  Goal: Absence of fever/infection during anticipated neutropenic period  Description: INTERVENTIONS  - Monitor WBC  - Administer growth factors as ordered  - Implement neutropenic guidelines  Outcome: Progressing     Problem: SAFETY ADULT - FALL  Goal: Free from fall injury  Description: INTERVENTIONS:  - Assess pt frequently for physical needs  - Identify cognitive and physical deficits and behaviors that affect risk of falls.  - [place] fall precautions as indicated by assessment.  - Educate pt/family on patient safety including physical limitations  - Instruct pt to call for assistance with activity based on assessment  - Modify environment to reduce risk of injury  - Provide assistive devices as appropriate  - Consider OT/PT consult to assist with strengthening/mobility  - Encourage toileting schedule  Outcome: Progressing     Problem: DISCHARGE PLANNING  Goal: Discharge to home or other facility with appropriate resources  Description: INTERVENTIONS:  - Identify barriers to discharge w/pt and caregiver  - Include patient/family/discharge partner in discharge planning  - Arrange for needed discharge resources and transportation as appropriate  - Identify discharge learning needs (meds, wound care, etc)  - Arrange for interpreters to assist at discharge as needed  - Consider post-discharge preferences of  patient/family/discharge partner  - Complete POLST form as appropriate  - Assess patient's ability to be responsible for managing their own health  - Refer to Case Management Department for coordinating discharge planning if the patient needs post-hospital services based on physician/LIP order or complex needs related to functional status, cognitive ability or social support system  Outcome: Progressing

## 2024-12-14 NOTE — PLAN OF CARE
Problem: Patient Centered Care  Goal: Patient preferences are identified and integrated in the patient's plan of care  Description: Interventions:  - What would you like us to know as we care for you? I am from home with my wife  - Provide timely, complete, and accurate information to patient/family  - Incorporate patient and family knowledge, values, beliefs, and cultural backgrounds into the planning and delivery of care  - Encourage patient/family to participate in care and decision-making at the level they choose  - Honor patient and family perspectives and choices  Outcome: Progressing     Problem: Diabetes/Glucose Control  Goal: Glucose maintained within prescribed range  Description: INTERVENTIONS:  - Monitor Blood Glucose as ordered  - Assess for signs and symptoms of hyperglycemia and hypoglycemia  - Administer ordered medications to maintain glucose within target range  - Assess barriers to adequate nutritional intake and initiate nutrition consult as needed  - Instruct patient on self management of diabetes  12/13/2024 2027 by Ivis Beatty, RN  Outcome: Progressing  12/13/2024 2024 by Ivis Beatty RN  Outcome: Progressing     Problem: Patient/Family Goals  Goal: Patient/Family Long Term Goal  Description: Patient's Long Term Goal: Discharge    Interventions:  - Monitor vitals  - Monitor appropriate labs  - Administer medications as ordered  - Follow MD's orders  - Update patient on plan of care   - Discharge planning     - See additional Care Plan goals for specific interventions  Outcome: Progressing  Goal: Patient/Family Short Term Goal  Description: Patient's Short Term Goal:     Interventions:   -   - See additional Care Plan goals for specific interventions  Outcome: Progressing     Problem: METABOLIC/FLUID AND ELECTROLYTES - ADULT  Goal: Glucose maintained within prescribed range  Description: INTERVENTIONS:  - Monitor Blood Glucose as ordered  - Assess for signs and symptoms of  hyperglycemia and hypoglycemia  - Administer ordered medications to maintain glucose within target range  - Assess barriers to adequate nutritional intake and initiate nutrition consult as needed  - Instruct patient on self management of diabetes  12/13/2024 2027 by Ivis Beatty, RN  Outcome: Progressing  12/13/2024 2024 by Ivis Beatty RN  Outcome: Progressing  Goal: Electrolytes maintained within normal limits  Description: INTERVENTIONS:  - Monitor labs and rhythm and assess patient for signs and symptoms of electrolyte imbalances  - Administer electrolyte replacement as ordered  - Monitor response to electrolyte replacements, including rhythm and repeat lab results as appropriate  - Fluid restriction as ordered  - Instruct patient on fluid and nutrition restrictions as appropriate  Outcome: Progressing  Goal: Hemodynamic stability and optimal renal function maintained  Description: INTERVENTIONS:  - Monitor labs and assess for signs and symptoms of volume excess or deficit  - Monitor intake, output and patient weight  - Monitor urine specific gravity, serum osmolarity and serum sodium as indicated or ordered  - Monitor response to interventions for patient's volume status, including labs, urine output, blood pressure (other measures as available)  - Encourage oral intake as appropriate  - Instruct patient on fluid and nutrition restrictions as appropriate  Outcome: Progressing     Patient fluid intake is monitored. HD done today, per HD RN patient educated on fluid overload and potassium levels and fluid intake-education reinforced by this RN at the bedside, call light within reach. 500 ml of fluid was removed as reported per HD RN.     Problem: PAIN - ADULT  Goal: Verbalizes/displays adequate comfort level or patient's stated pain goal  Description: INTERVENTIONS:  - Encourage pt to monitor pain and request assistance  - Assess pain using appropriate pain scale  - Administer analgesics based on type  and severity of pain and evaluate response  - Implement non-pharmacological measures as appropriate and evaluate response  - Consider cultural and social influences on pain and pain management  - Manage/alleviate anxiety  - Utilize distraction and/or relaxation techniques  - Monitor for opioid side effects  - Notify MD/LIP if interventions unsuccessful or patient reports new pain  - Anticipate increased pain with activity and pre-medicate as appropriate  Outcome: Progressing     Problem: SAFETY ADULT - FALL  Goal: Free from fall injury  Description: INTERVENTIONS:  - Assess pt frequently for physical needs  - Identify cognitive and physical deficits and behaviors that affect risk of falls.  - [place] fall precautions as indicated by assessment.  - Educate pt/family on patient safety including physical limitations  - Instruct pt to call for assistance with activity based on assessment  - Modify environment to reduce risk of injury  - Provide assistive devices as appropriate  - Consider OT/PT consult to assist with strengthening/mobility  - Encourage toileting schedule  Outcome: Progressing     Problem: DISCHARGE PLANNING  Goal: Discharge to home or other facility with appropriate resources  Description: INTERVENTIONS:  - Identify barriers to discharge w/pt and caregiver  - Include patient/family/discharge partner in discharge planning  - Arrange for needed discharge resources and transportation as appropriate  - Identify discharge learning needs (meds, wound care, etc)  - Arrange for interpreters to assist at discharge as needed  - Consider post-discharge preferences of patient/family/discharge partner  - Complete POLST form as appropriate  - Assess patient's ability to be responsible for managing their own health  - Refer to Case Management Department for coordinating discharge planning if the patient needs post-hospital services based on physician/LIP order or complex needs related to functional status, cognitive  ability or social support system  Outcome: Progressing     Patient is presently resting in the bed. Alert x 4. Vital signs taken and stable. Fall risk precautions are followed at all times. Patient is turn and repositioned every 2 hours. Prompt care is given to incontinence. Kept clean and dry. Patient is monitored for pain or discomfort. Patient receives bolus tube feedings per order. Call light within reach at all times. Patient has air mattress in place and in use for comfort. Bed alarm in use at all times.

## 2024-12-14 NOTE — PROGRESS NOTES
[facility]  part of [facility]    Nephrology Progress Note    Chief Complaint   Patient presents with    Foot Pain       Subjective:   79 year old male, ESRD on HD.     Pain in legs not improved much.   HD yesterday. Hypotensive during dialysis.     Review of Systems:   Review of Systems   Constitutional:  Negative for chills, fatigue and fever.   Respiratory:  Negative for cough and shortness of breath.    Cardiovascular:  Negative for chest pain and leg swelling.   Gastrointestinal:  Negative for abdominal pain, constipation, diarrhea, nausea and vomiting.   Musculoskeletal:  Positive for arthralgias.       Objective:   Temp:  [98.3 °F (36.8 °C)-99.6 °F (37.6 °C)] 99.2 °F (37.3 °C)  Pulse:  [76-87] 85  Resp:  [18] 18  BP: (107-124)/(47-53) 108/52  SpO2:  [86 %-97 %] 96 %  SpO2: 96 %     Intake/Output Summary (Last 24 hours) at 12/14/2024 1154  Last data filed at 12/13/2024 1547  Gross per 24 hour   Intake 405 ml   Output 530 ml   Net -125 ml     Wt Readings from Last 3 Encounters:   12/14/24 151 lb 14.4 oz (68.9 kg)   11/30/24 145 lb (65.8 kg)   10/22/24 132 lb 14.4 oz (60.3 kg)     Physical Exam  Constitutional:       Appearance: Normal appearance.   Cardiovascular:      Rate and Rhythm: Normal rate and regular rhythm.      Heart sounds: Normal heart sounds.      Comments: R upper arm AVF-pos bruit/thrill  Pulmonary:      Effort: Pulmonary effort is normal.      Breath sounds: Normal breath sounds.   Musculoskeletal:         General: Normal range of motion.      Comments: Neg edema   Skin:     General: Skin is warm and dry.   Neurological:      General: No focal deficit present.      Mental Status: He is alert and oriented to person, place, and time.   Psychiatric:         Mood and Affect: Mood normal.         Behavior: Behavior normal.         Medications:  Current Facility-Administered Medications   Medication Dose Route Frequency    sodium chloride 0.9 % IV bolus 100 mL  100 mL Intravenous  Q30 Min PRN    And    albumin human (Albumin) 25% injection 25 g  25 g Intravenous PRN Dialysis    carvedilol (Coreg) tab 12.5 mg  12.5 mg Per G Tube BID    oxyCODONE immediate release tab 5 mg  5 mg Oral Q4H PRN    gabapentin (Neurontin) cap 300 mg  300 mg Per G Tube BID    aspirin chewable tab 81 mg  81 mg Per G Tube Daily    lidocaine-menthol 4-1 % patch 2 patch  2 patch Transdermal Daily    LORazepam (Ativan) tab 0.5 mg  0.5 mg Oral Q6H PRN    heparin (Porcine) 1000 UNIT/ML injection 1,500 Units  1.5 mL Intravenous PRN Dialysis    lidocaine-prilocaine (Emla) 2.5-2.5 % cream   Topical PRN    dextrose 10% infusion (TPN no rate)   Intravenous Continuous PRN    pancrelipase (Lip-Prot-Amyl) (Zenpep) DR particles cap 10,000 Units  10,000 Units Per G Tube PRN    And    sodium bicarbonate tab 325 mg  325 mg Oral PRN    metoprolol (Lopressor) 5 mg/5mL injection 5 mg  5 mg Intravenous PRN    Or    metoprolol (Lopressor) 5 mg/5mL injection 2.5 mg  2.5 mg Intravenous PRN    epoetin donna (Epogen, Procrit) 54944 UNIT/ML injection 10,000 Units  10,000 Units Subcutaneous Once per day on Monday Wednesday Friday    nortriptyline (Pamelor) cap 10 mg  10 mg Oral Nightly    HYDROmorphone (Dilaudid) 1 MG/ML injection 0.1 mg  0.1 mg Intravenous Q2H PRN    Or    HYDROmorphone (Dilaudid) 1 MG/ML injection 0.2 mg  0.2 mg Intravenous Q2H PRN    Or    HYDROmorphone (Dilaudid) 1 MG/ML injection 0.4 mg  0.4 mg Intravenous Q2H PRN    amiodarone (Pacerone) tab 200 mg  200 mg Per G Tube Daily    atorvastatin (Lipitor) tab 40 mg  40 mg Per G Tube Nightly    [Held by provider] clopidogrel (Plavix) tab 75 mg  75 mg Oral Daily    fluticasone propionate (Flonase) 50 MCG/ACT nasal suspension 2 spray  2 spray Nasal Daily    insulin degludec (Tresiba) 100 units/mL flextouch 5 Units  5 Units Subcutaneous Nightly    lansoprazole (Prevacid Solutab) disintegrating tab 30 mg  30 mg Per G Tube QAM AC    glucose (Dex4) 15 GM/59ML oral liquid 15 g  15 g Oral  Q15 Min PRN    Or    glucose (Glutose) 40% oral gel 15 g  15 g Oral Q15 Min PRN    Or    glucose-vitamin C (Dex-4) chewable tab 4 tablet  4 tablet Oral Q15 Min PRN    Or    dextrose 50% injection 50 mL  50 mL Intravenous Q15 Min PRN    Or    glucose (Dex4) 15 GM/59ML oral liquid 30 g  30 g Oral Q15 Min PRN    Or    glucose (Glutose) 40% oral gel 30 g  30 g Oral Q15 Min PRN    Or    glucose-vitamin C (Dex-4) chewable tab 8 tablet  8 tablet Oral Q15 Min PRN    heparin (Porcine) 5000 UNIT/ML injection 5,000 Units  5,000 Units Subcutaneous Q8H STEPHANIE    acetaminophen (Tylenol Extra Strength) tab 500 mg  500 mg Oral Q4H PRN    acetaminophen (Tylenol) tab 650 mg  650 mg Oral Q4H PRN    polyethylene glycol (PEG 3350) (Miralax) 17 g oral packet 17 g  17 g Oral Daily PRN    sennosides (Senokot) tab 17.2 mg  17.2 mg Oral Nightly PRN    bisacodyl (Dulcolax) 10 MG rectal suppository 10 mg  10 mg Rectal Daily PRN    insulin aspart (NovoLOG) 100 Units/mL FlexPen 1-7 Units  1-7 Units Subcutaneous TID CC and HS              Results:     Recent Labs   Lab 12/10/24  0534 12/10/24  1709 12/11/24  0653 12/14/24  0825   RBC 2.22*  --  2.80* 2.59*   HGB 6.8* 8.3* 8.7* 8.0*   HCT 21.5*  --  27.0* 25.4*   MCV 96.8  --  96.4 98.1   NEPRELIM 7.54  --  8.12* 8.67*   WBC 10.1  --  11.1* 11.5*   .0  --  245.0 244.0     Recent Labs   Lab 12/08/24  0158 12/08/24  0549 12/09/24  0546 12/10/24  0534 12/11/24  0653 12/14/24  0825   GLU  --  184*   < > 205* 156* 168*   BUN  --  54*   < > 22 35* 40*   CREATSERUM  --  5.29*   < > 2.46* 3.35* 2.92*   CA  --  9.2   < > 9.0 9.1 9.6   ALB 2.25* 3.0*  --   --   --   --    NA  --  133*   < > 136 135* 137   K  --  3.8   < > 3.5 4.7 5.0   CL  --  94*   < > 98 96* 100   CO2  --  32.0   < > 33.0* 33.0* 32.0   TP 5.7  --   --   --   --   --     < > = values in this interval not displayed.     PTT   Date Value Ref Range Status   08/09/2024 30.1 23.0 - 36.0 seconds Final     INR   Date Value Ref Range Status    08/09/2024 1.45 (H) 0.80 - 1.20 Final     Comment:     Therapeutic INR range for patients on warfarin:  2.0-3.0 for most medical conditions and surgical prophylaxis   2.5-3.5 for mechanical heart valves and recurrent thromboembolism    Direct thrombin inhibitors (e.g. argatroban, bivalirudin), factor Xa inhibitors (e.g. apixaban, rivaroxaban), and conditions such as coagulation factor deficiency, vitamin K deficiency, and liver disease will prolong the prothrombin time/INR.    Unfractionated heparin and low molecular weight heparin do not affect the prothrombin time/INR up to a concentration of 1 IU/mL and 1.5 IU/mL, respectively.         No results for input(s): \"BNP\" in the last 168 hours.  Recent Labs   Lab 12/08/24  0549 12/09/24  0546 12/10/24  0534 12/11/24  0653   MG 2.4 2.3  --  2.2   PHOS 2.1* 1.6* 1.0* 4.0        Recent Labs   Lab 12/08/24  0158 12/08/24  0549 12/08/24  0549 12/09/24  0546 12/10/24  0534 12/11/24  0653   PHOS  --  2.1*   < > 1.6* 1.0* 4.0   ALB 2.25* 3.0*  --   --   --   --     < > = values in this interval not displayed.         Assessment and Plan:     79 year old male with past medical history of T2DM, HTN, HLD, ESRD on HD MWF via R AVF, CAD s/p PCI (5/2024), A.fib on Eliquis, HFpEF, KRAIG, BPH, history of recurrent gastrointestinal bleeding, who presented with bilateral lower extremity pain.      ESRD HD MWF:   Next HD Monday.     Bilateral lower extremity pain:   Pain control.   PT.      CAD/A.fib:   On ASA/plavix and amiodarone.   Hypotensive, will decrease Coreg to 6.25 mg BID.      Anemia:   Hb 8 today, s/p 1 unit pRBCs 12/10.   Continue EPO 10,000 units 3 times per week.      Mirta Redman MD  12/14/2024

## 2024-12-14 NOTE — CHRONIC PAIN
[facility]  Inpatient Pain Management Progress Note      Patient name: Ollie Hernández 79 year old male  : 1945  MRN: [id number]    Diagnosis:   1. Peripheral polyneuropathy    2. ESRD on hemodialysis (HCC)        Reason for Consult: LE pain    Current hospital day: Hospital Day: 10    Pain Scores: unable to assess, patient very lethargic, tracking with his eyes but uncommunicative. Mild distress.     Subjective:   Patient in HD this AM. Per wife patient is still having pain in his legs. MRI showing spinal stenosis worse at the L4-5 level.   Patient is pain is moderately controlled.  He states the medications that we are giving him make him sleepy but do allow him to feel better.  He still reports occasional shocks in his bilateral feet.      Currently patient is getting gabapentin 100 mg twice daily, lidocaine menthol patch, nortriptyline 10 mg nightly, acetaminophen 500 mg every 4 hours, Norco 5 mg every 4 hours with hydromorphone 0.1 to 0.2 mg every 2 as needed.    History:  Past Medical History:    Anemia    Asthma ([facility])    Back problem    BPH (benign prostatic hyperplasia)    Calculus of kidney    Cataract    Coronary atherosclerosis    Diabetes (HCC)    ESRD (end stage renal disease) on dialysis ([facility])    Essential hypertension    High blood pressure    High cholesterol    History of blood transfusion    Hyperlipidemia    Neuropathy    hands and feet    KRAIG on CPAP    Renal disorder    Sleep apnea    Visual impairment    glasses    Vocal cord paralysis, unilateral partial     Past Surgical History:   Procedure Laterality Date    Appendectomy          Back surgery      Neck/back -     Capsule endoscopy - internal referral      Cataract extraction Right 2022    PHACOEMULSIFICATION WITH POSTERIOR CHAMBER INTRAOCULAR LENS, RIGHT EYE    Cataract extraction Left 2021    PHACOEMULSIFICATION WITH POSTERIOR CHAMBER INTRAOCULAR LENS, LEFT EYE    Cath bare metal stent (bms)       Cath drug eluting stent  05/21/2024    Successful IVUS guided PCI of the circumflex with a ABIMBOLA    Colonoscopy      Colonoscopy N/A 01/25/2021    Procedure: COLONOSCOPY;  Surgeon: Michael Gautam MD;  Location: [facility] ENDO    Colonoscopy N/A 06/03/2024    Procedure: COLONOSCOPY;  Surgeon: Gabriel Saldana MD;  Location: [facility] ENDOSCOPY    Colonoscopy N/A 06/15/2024    Procedure: COLONOSCOPY;  Surgeon: Carlyn Storey MD;  Location: [facility] ENDOSCOPY    Colonoscopy N/A 07/31/2024    Procedure: COLONOSCOPY;  Surgeon: Gabriel Saldana MD;  Location: [facility] ENDOSCOPY    Dialysis procedure  02/17/2023    right internal jugular tunneled dialysis catheter placement.    Hand/finger surgery unlisted      Accidental trauma    Spine surgery procedure unlisted      Total hip arthroplasty Left 10/04/2024    Left anterior total hip arthroplasty    Upper gi endoscopy,diagnosis       Family History   Problem Relation Age of Onset    Cancer Father         Kidney    Breast Cancer Mother     Diabetes Mother     Diabetes Maternal Grandmother     Diabetes Maternal Grandfather     Heart Disorder Other         Uncle      reports that he quit smoking about 43 years ago. His smoking use included cigarettes. He started smoking about 60 years ago. He has a 17 pack-year smoking history. He has never used smokeless tobacco. He reports that he does not drink alcohol and does not use drugs.    Allergies:  Allergies[1]    Current Medications:  Scheduled Meds:   carvedilol  12.5 mg Per G Tube BID    gabapentin  300 mg Per G Tube BID    aspirin  81 mg Per G Tube Daily    lidocaine-menthol  2 patch Transdermal Daily    epoetin donna  10,000 Units Subcutaneous Once per day on Monday Wednesday Friday    nortriptyline  10 mg Oral Nightly    amiodarone  200 mg Per G Tube Daily    atorvastatin  40 mg Per G Tube Nightly    [Held by provider] clopidogrel  75 mg Oral Daily    fluticasone propionate  2 spray Nasal Daily    insulin degludec  5 Units Subcutaneous Nightly     lansoprazole  30 mg Per G Tube QAM AC    heparin  5,000 Units Subcutaneous Q8H STEPHANIE    insulin aspart  1-7 Units Subcutaneous TID CC and HS     Continuous Infusions:   dextrose 10%       PRN Meds:.  sodium chloride **AND** albumin human    oxyCODONE    LORazepam    heparin    lidocaine-prilocaine    dextrose 10%    lipase-protease-amylase (Lip-Prot-Amyl) **AND** sodium bicarbonate    metoprolol **OR** metoprolol    HYDROmorphone **OR** HYDROmorphone **OR** HYDROmorphone    glucose **OR** glucose **OR** glucose-vitamin C **OR** dextrose **OR** glucose **OR** glucose **OR** glucose-vitamin C    acetaminophen    acetaminophen **OR** [DISCONTINUED] HYDROcodone-acetaminophen **OR** [DISCONTINUED] HYDROcodone-acetaminophen    polyethylene glycol (PEG 3350)    sennosides    bisacodyl    Exam:Blood pressure 108/52, pulse 85, temperature 99.2 °F (37.3 °C), temperature source Oral, resp. rate 18, height 5' 4\" (1.626 m), weight 151 lb 14.4 oz (68.9 kg), SpO2 96%.    Imaging:   Lumbar MRI  CONCLUSION:      1. Multilevel degenerative changes of the lumbar spine, which are described in detail above and are similar when compared to 02/07/2023.   2. At L4-L5, there is severe canal stenosis, severe bilateral subarticular zone stenosis, and moderate to severe bilateral foraminal narrowing.   3. Redemonstrated postoperative changes from prior decompressive laminectomies at L2-L4.   4. Grade 1 anterolisthesis of L4 on L5.   5. No acute fracture or traumatic listhesis of the lumbar spine.   6. Multifocal abnormal increased T2/STIR signal involving the paraspinal musculature, which may reflect atrophy, denervation, or less likely reflect myositis.   7. Circumferential bladder wall thickening, which may be secondary to underdistention, cystitis, or chronic outlet obstruction.   8. Lesser incidental findings described above.          Assessment:  79 year old male with past medical history of T2DM, HTN, HLD, ESRD on HD MWF via R AVF, CAD  s/p PCI (5/2024), A.fib on AC, HFpEF, KRAIG, BPH, history of recurrent gastrointestinal bleeding, who presented with bilateral lower extremity pain.    Pain management was consulted for bilateral lower extremities pain in the setting of peripheral neuropathy. MRI was also done showing severe canal stenosis at L4-5. Patient pain is likely multifactorial from both diabetic neuropathy and spinal stenosis. History is more indicative of peripheral neuropathy pain.     Currently patient states his pain is moderately controlled on his current regimen.  He states the [place] helps his pain however it does make him tired.  He is on low-dose gabapentin given his renal failure.  He is also on nortriptyline 10 mg nightly.    Plan:  - No pain interventions.   - Hold all pain meds temporarily given severe confusion.   -Continue lidocaine patch and nortiptyline   - Could consider PCA given fluctuating level of consciousness.  - Work up for AMS per primary team.      Anthony Mahoney MD, 12/14/24, 11:58 AM    Total Time: 30 minutes     Lengthy discussion of risks, benefits, and alternative treatments were reviewed to patients satisfaction. All questions were answered. Patient agreeable to plan. Greater than 50% of the time was spent with counseling (nature of discussion centered around pain, therapy, and treatment options), face to face time, time spent reviewing data, obtaining patient information and discussing the care with the patients health care providers.     Chronic Pain Service [phone]       [1]   Allergies  Allergen Reactions    Adhesive Tape OTHER (SEE COMMENTS)     Severe rashes    Dust Mite Extract RASH

## 2024-12-14 NOTE — PROGRESS NOTES
[facility]  part of [facility]    Progress Note    Ollie Hernández Patient Status:  Inpatient    1945 MRN [id number]   Location [facility] 5SW/SE Attending Dee Joyce,*   Hosp Day # 8 PCP Wili Parmar MD     Chief Complaint: foot pain    Subjective:   Family at bedside, patient complaining of more shortness of breath slightly hypoxic high 80s placed on 2 L room air.  Appears more alert sitting up at this time compared to earlier this morning when I rounded on him.  He is not complaining of pain but he does say he had an episode of \"confusion\" where he did not know where he was but now he recognizes his wife      Objective:   Blood pressure 108/52, pulse 85, temperature 99.2 °F (37.3 °C), temperature source Oral, resp. rate 18, height 5' 4\" (1.626 m), weight 151 lb 14.4 oz (68.9 kg), SpO2 96%.  Physical Exam  Vitals and nursing note reviewed.   Constitutional:       General: He is not in acute distress.     Appearance: He is ill-appearing. He is not toxic-appearing or diaphoretic.   Cardiovascular:      Rate and Rhythm: Normal rate.      Pulses: Normal pulses.   Pulmonary:      Breath sounds: Rhonchi present. No wheezing.   Abdominal:      General: Bowel sounds are normal.      Palpations: Abdomen is soft.   Skin:     General: Skin is warm and dry.      Capillary Refill: Capillary refill takes less than 2 seconds.   Neurological:      General: No focal deficit present.      Mental Status: He is alert.   Psychiatric:         Behavior: Behavior normal.         Results:   Lab Results   Component Value Date    WBC 11.1 (H) 2024    HGB 8.7 (L) 2024    HCT 27.0 (L) 2024    .0 2024    CREATSERUM 3.35 (H) 2024    BUN 35 (H) 2024     (L) 2024    K 4.7 2024    CL 96 (L) 2024    CO2 33.0 (H) 2024     (H) 2024    CA 9.1 2024    ALB 3.0 (L) 2024    ALKPHO 116 2024    BILT  0.3 12/07/2024    TP 5.7 12/08/2024    AST 32 12/07/2024    ALT 29 12/07/2024    PTT 30.1 08/09/2024    INR 1.45 (H) 08/09/2024    T4F 0.9 08/31/2022    TSH 2.844 07/04/2024     (H) 07/30/2024    PSA 2.94 10/20/2021    DDIMER 6.07 (H) 09/29/2024    ESRML 10 06/16/2024    CRP 1.30 (H) 06/16/2024    MG 2.2 12/11/2024    PHOS 4.0 12/11/2024    TROP <0.045 07/25/2019    TROPHS 52 12/03/2024     08/05/2023    B12 672 07/04/2024       No results found.        Assessment & Plan:         Peripheral polyneuropathy; checked rpr ok; thyroid; electropheresis; b12 ok 7/24  -Patient p/w bilateral lower extremity pain  -Significantly worse over the past 3 to 4 days.  -No real improvement with gabapentin at home  -Difficulties with ambulation because of pain  -Pain control as able; pt screaming in pain or asleep; pain clinic saw; await mri  poss steroid   -PT/OT  -Continue to monitor  -MRI results reviewed   -12/14: Discussed with pain service in view of increased confusion and hypoxia will discontinue all narcotics at this time, if pain ensues consider PCA for better autoregulation.  Hopefully patient can still be plan for WILLIAM on 12/17/2024 which is when he would be 7 days away from Plavix.  Appreciate pain service    Acute respiratory failure, confusion/hypoxia: Significant event 12/14/2020  Possibly secondary to narcotics although concern for aspiration versus pneumonia  Chest x-ray consistent with some pulmonary edema and questionable underlying pneumonia  CT head stat pending  ABG reviewed no CO2 narcosis  IV ceftriaxone and IV metronidazole initiated.  O2 support as needed  Continue to monitor     ESRD on HD  -Normally receives dialysis on Monday Wednesday Friday.  Last session was on Wednesday.  -Nephrology consulted, further HD per nephrology.     Anemia of chronic renal disease and iron defy checked stool for blood, neg;  -Likely secondary to ESRD as above  -Hemoglobin noted to be 7.1  -Continue to monitor      Type 2 DM:  - Monitoring Blood glucose with POC checks  - SSI to cover hyperglycemia  - Hypoglycemia protocol  - Will monitor and adjust agents as needed.  -Patient has appoint with Dr. Sevilla on 12/17/2024.  If patient does get epidural steroid injection that day I will reach out to her to see if she needs to make any adjustments on patient's diabetic management.     -KRAIG  -Hypertension  -Chronic dCHF  -Pulm HTN    Afib   has watchman dr romano put in in 9/2024; mri ok with watchman per dr coffey  12/13: Patient had episode of asymptomatic transient RVR self-limited.         Cad with left circ stent 5/24 poss need steroid inj; will hold and notify cards dr gale    Patient will require inpatient services that will reasonably be expected to span two midnight's based on the clinical documentation in H+P.   Based on patients current state of illness, I anticipate that, after discharge, patient will require TBD.  Complex mdm; coordinating care with nurse/duly cards and counseling pt and with his permission his wife on phone about labs/neuropathy/mri/plavix/stent

## 2024-12-15 LAB
GLUCOSE BLDC GLUCOMTR-MCNC: 153 MG/DL (ref 70–99)
GLUCOSE BLDC GLUCOMTR-MCNC: 199 MG/DL (ref 70–99)
GLUCOSE BLDC GLUCOMTR-MCNC: 206 MG/DL (ref 70–99)
GLUCOSE BLDC GLUCOMTR-MCNC: 229 MG/DL (ref 70–99)

## 2024-12-15 PROCEDURE — 99233 SBSQ HOSP IP/OBS HIGH 50: CPT | Performed by: INTERNAL MEDICINE

## 2024-12-15 PROCEDURE — 99232 SBSQ HOSP IP/OBS MODERATE 35: CPT | Performed by: ANESTHESIOLOGY

## 2024-12-15 PROCEDURE — 99232 SBSQ HOSP IP/OBS MODERATE 35: CPT | Performed by: INTERNAL MEDICINE

## 2024-12-15 RX ORDER — METOPROLOL SUCCINATE 25 MG/1
25 TABLET, EXTENDED RELEASE ORAL DAILY
Status: DISCONTINUED | OUTPATIENT
Start: 2024-12-16 | End: 2024-12-16

## 2024-12-15 RX ORDER — ACETAMINOPHEN 500 MG
500 TABLET ORAL EVERY 6 HOURS
Status: DISCONTINUED | OUTPATIENT
Start: 2024-12-15 | End: 2024-12-23

## 2024-12-15 NOTE — PROGRESS NOTES
[facility]  part of [facility]    Nephrology Progress Note    Chief Complaint   Patient presents with    Foot Pain       Subjective:   79 year old male, ESRD on HD.     Pain in legs not improved much.     Review of Systems:   Review of Systems   Constitutional:  Negative for chills, fatigue and fever.   Respiratory:  Negative for cough and shortness of breath.    Cardiovascular:  Negative for chest pain and leg swelling.   Gastrointestinal:  Negative for abdominal pain, constipation, diarrhea, nausea and vomiting.   Musculoskeletal:  Positive for arthralgias.       Objective:   Temp:  [98.4 °F (36.9 °C)-98.8 °F (37.1 °C)] 98.8 °F (37.1 °C)  Pulse:  [70-74] 74  Resp:  [18] 18  BP: (105-117)/(49-55) 105/49  SpO2:  [98 %-100 %] 100 %  SpO2: 100 %     Intake/Output Summary (Last 24 hours) at 12/15/2024 1107  Last data filed at 12/15/2024 0800  Gross per 24 hour   Intake 1829 ml   Output --   Net 1829 ml     Wt Readings from Last 3 Encounters:   12/15/24 165 lb 12.6 oz (75.2 kg)   11/30/24 145 lb (65.8 kg)   10/22/24 132 lb 14.4 oz (60.3 kg)     Physical Exam  Constitutional:       Appearance: Normal appearance.   Cardiovascular:      Rate and Rhythm: Normal rate and regular rhythm.      Heart sounds: Normal heart sounds.      Comments: R upper arm AVF-pos bruit/thrill  Pulmonary:      Effort: Pulmonary effort is normal.      Breath sounds: Normal breath sounds.   Musculoskeletal:         General: Normal range of motion.      Comments: Neg edema   Skin:     General: Skin is warm and dry.   Neurological:      General: No focal deficit present.      Mental Status: He is alert and oriented to person, place, and time.   Psychiatric:         Mood and Affect: Mood normal.         Behavior: Behavior normal.         Medications:  Current Facility-Administered Medications   Medication Dose Route Frequency    carvedilol (Coreg) tab 6.25 mg  6.25 mg Per G Tube BID    ampicillin-sulbactam (Unasyn) 1.5 g in  sodium chloride 0.9% 100mL IVPB-MBP  1.5 g Intravenous q12h    insulin regular human (Novolin R, Humulin R) 100 UNIT/ML injection 1-7 Units  1-7 Units Subcutaneous 4 times per day    [Held by provider] gabapentin (Neurontin) cap 300 mg  300 mg Per G Tube BID    aspirin chewable tab 81 mg  81 mg Per G Tube Daily    lidocaine-menthol 4-1 % patch 2 patch  2 patch Transdermal Daily    [Held by provider] LORazepam (Ativan) tab 0.5 mg  0.5 mg Oral Q6H PRN    heparin (Porcine) 1000 UNIT/ML injection 1,500 Units  1.5 mL Intravenous PRN Dialysis    lidocaine-prilocaine (Emla) 2.5-2.5 % cream   Topical PRN    dextrose 10% infusion (TPN no rate)   Intravenous Continuous PRN    pancrelipase (Lip-Prot-Amyl) (Zenpep) DR particles cap 10,000 Units  10,000 Units Per G Tube PRN    And    sodium bicarbonate tab 325 mg  325 mg Oral PRN    metoprolol (Lopressor) 5 mg/5mL injection 5 mg  5 mg Intravenous PRN    Or    metoprolol (Lopressor) 5 mg/5mL injection 2.5 mg  2.5 mg Intravenous PRN    epoetin donna (Epogen, Procrit) 91202 UNIT/ML injection 10,000 Units  10,000 Units Subcutaneous Once per day on Monday Wednesday Friday    nortriptyline (Pamelor) cap 10 mg  10 mg Oral Nightly    [Held by provider] HYDROmorphone (Dilaudid) 1 MG/ML injection 0.1 mg  0.1 mg Intravenous Q2H PRN    Or    [Held by provider] HYDROmorphone (Dilaudid) 1 MG/ML injection 0.2 mg  0.2 mg Intravenous Q2H PRN    Or    [Held by provider] HYDROmorphone (Dilaudid) 1 MG/ML injection 0.4 mg  0.4 mg Intravenous Q2H PRN    amiodarone (Pacerone) tab 200 mg  200 mg Per G Tube Daily    atorvastatin (Lipitor) tab 40 mg  40 mg Per G Tube Nightly    [Held by provider] clopidogrel (Plavix) tab 75 mg  75 mg Oral Daily    fluticasone propionate (Flonase) 50 MCG/ACT nasal suspension 2 spray  2 spray Nasal Daily    insulin degludec (Tresiba) 100 units/mL flextouch 5 Units  5 Units Subcutaneous Nightly    lansoprazole (Prevacid Solutab) disintegrating tab 30 mg  30 mg Per G Tube QAM  AC    glucose (Dex4) 15 GM/59ML oral liquid 15 g  15 g Oral Q15 Min PRN    Or    glucose (Glutose) 40% oral gel 15 g  15 g Oral Q15 Min PRN    Or    glucose-vitamin C (Dex-4) chewable tab 4 tablet  4 tablet Oral Q15 Min PRN    Or    dextrose 50% injection 50 mL  50 mL Intravenous Q15 Min PRN    Or    glucose (Dex4) 15 GM/59ML oral liquid 30 g  30 g Oral Q15 Min PRN    Or    glucose (Glutose) 40% oral gel 30 g  30 g Oral Q15 Min PRN    Or    glucose-vitamin C (Dex-4) chewable tab 8 tablet  8 tablet Oral Q15 Min PRN    heparin (Porcine) 5000 UNIT/ML injection 5,000 Units  5,000 Units Subcutaneous Q8H STEPHANIE    acetaminophen (Tylenol Extra Strength) tab 500 mg  500 mg Oral Q4H PRN    acetaminophen (Tylenol) tab 650 mg  650 mg Oral Q4H PRN    polyethylene glycol (PEG 3350) (Miralax) 17 g oral packet 17 g  17 g Oral Daily PRN    sennosides (Senokot) tab 17.2 mg  17.2 mg Oral Nightly PRN    bisacodyl (Dulcolax) 10 MG rectal suppository 10 mg  10 mg Rectal Daily PRN              Results:     Recent Labs   Lab 12/10/24  0534 12/10/24  1709 12/11/24  0653 12/14/24  0825   RBC 2.22*  --  2.80* 2.59*   HGB 6.8* 8.3* 8.7* 8.0*   HCT 21.5*  --  27.0* 25.4*   MCV 96.8  --  96.4 98.1   NEPRELIM 7.54  --  8.12* 8.67*   WBC 10.1  --  11.1* 11.5*   .0  --  245.0 244.0     Recent Labs   Lab 12/10/24  0534 12/11/24  0653 12/14/24  0825   * 156* 168*   BUN 22 35* 40*   CREATSERUM 2.46* 3.35* 2.92*   CA 9.0 9.1 9.6    135* 137   K 3.5 4.7 5.0   CL 98 96* 100   CO2 33.0* 33.0* 32.0     PTT   Date Value Ref Range Status   08/09/2024 30.1 23.0 - 36.0 seconds Final     INR   Date Value Ref Range Status   08/09/2024 1.45 (H) 0.80 - 1.20 Final     Comment:     Therapeutic INR range for patients on warfarin:  2.0-3.0 for most medical conditions and surgical prophylaxis   2.5-3.5 for mechanical heart valves and recurrent thromboembolism    Direct thrombin inhibitors (e.g. argatroban, bivalirudin), factor Xa inhibitors (e.g.  apixaban, rivaroxaban), and conditions such as coagulation factor deficiency, vitamin K deficiency, and liver disease will prolong the prothrombin time/INR.    Unfractionated heparin and low molecular weight heparin do not affect the prothrombin time/INR up to a concentration of 1 IU/mL and 1.5 IU/mL, respectively.         No results for input(s): \"BNP\" in the last 168 hours.  Recent Labs   Lab 12/09/24  0546 12/10/24  0534 12/11/24  0653   MG 2.3  --  2.2   PHOS 1.6* 1.0* 4.0        Recent Labs   Lab 12/09/24  0546 12/10/24  0534 12/11/24  0653   PHOS 1.6* 1.0* 4.0         Assessment and Plan:     79 year old male with past medical history of T2DM, HTN, HLD, ESRD on HD MWF via R AVF, CAD s/p PCI (5/2024), A.fib on Eliquis, HFpEF, KRAIG, BPH, history of recurrent gastrointestinal bleeding, who presented with bilateral lower extremity pain.      ESRD HD MWF:   Next HD Monday.     Bilateral lower extremity pain:   Pain control.   PT.      CAD/A.fib:   On ASA/plavix and amiodarone.   Hypotensive, will change Coreg to metoprolol XL 25 mg daily.     Anemia:   Hb 8 today, s/p 1 unit pRBCs 12/10.   Continue EPO 10,000 units 3 times per week.      Mirta Redman MD  12/15/2024

## 2024-12-15 NOTE — CHRONIC PAIN
[facility]  Inpatient Pain Management Progress Note      Patient name: Ollie Hernández 79 year old male  : 1945  MRN: [id number]    Diagnosis:   1. Peripheral polyneuropathy    2. ESRD on hemodialysis (HCC)        Reason for Consult: LE pain    Current hospital day: Hospital Day: 11    Pain Scores: Awake alert c/o. Mild distress.   Mostly burning  Subjective:   Patient in HD this AM. Per wife patient is still having pain in his legs. MRI showing spinal stenosis worse at the L4-5 level.   Patient is pain is moderately controlled.  He states the medications that we are giving him make him sleepy but do allow him to feel better.  He still reports occasional shocks in his bilateral feet.      Currently patient is getting gabapentin 100 mg twice daily, lidocaine menthol patch, nortriptyline 10 mg nightly, acetaminophen 500 mg every 4 hours, Norco 5 mg every 4 hours with hydromorphone 0.1 to 0.2 mg every 2 as needed. Pt had AMS change thus pain meds stopped      History:  Past Medical History:    Anemia    Asthma (formerly [facility])    Back problem    BPH (benign prostatic hyperplasia)    Calculus of kidney    Cataract    Coronary atherosclerosis    Diabetes (formerly [facility])    ESRD (end stage renal disease) on dialysis (formerly [facility])    Essential hypertension    High blood pressure    High cholesterol    History of blood transfusion    Hyperlipidemia    Neuropathy    hands and feet    KRAIG on CPAP    Renal disorder    Sleep apnea    Visual impairment    glasses    Vocal cord paralysis, unilateral partial     Past Surgical History:   Procedure Laterality Date    Appendectomy          Back surgery      Neck/back -     Capsule endoscopy - internal referral      Cataract extraction Right 2022    PHACOEMULSIFICATION WITH POSTERIOR CHAMBER INTRAOCULAR LENS, RIGHT EYE    Cataract extraction Left 2021    PHACOEMULSIFICATION WITH POSTERIOR CHAMBER INTRAOCULAR LENS, LEFT EYE    Cath bare metal stent (bms)      Cath drug  eluting stent  05/21/2024    Successful IVUS guided PCI of the circumflex with a ABIMBOLA    Colonoscopy      Colonoscopy N/A 01/25/2021    Procedure: COLONOSCOPY;  Surgeon: Michael Gautam MD;  Location: [facility] ENDO    Colonoscopy N/A 06/03/2024    Procedure: COLONOSCOPY;  Surgeon: Gabriel Saldana MD;  Location: [facility] ENDOSCOPY    Colonoscopy N/A 06/15/2024    Procedure: COLONOSCOPY;  Surgeon: Carlyn Storey MD;  Location: [facility] ENDOSCOPY    Colonoscopy N/A 07/31/2024    Procedure: COLONOSCOPY;  Surgeon: Gabriel Saldana MD;  Location: [facility] ENDOSCOPY    Dialysis procedure  02/17/2023    right internal jugular tunneled dialysis catheter placement.    Hand/finger surgery unlisted      Accidental trauma    Spine surgery procedure unlisted      Total hip arthroplasty Left 10/04/2024    Left anterior total hip arthroplasty    Upper gi endoscopy,diagnosis       Family History   Problem Relation Age of Onset    Cancer Father         Kidney    Breast Cancer Mother     Diabetes Mother     Diabetes Maternal Grandmother     Diabetes Maternal Grandfather     Heart Disorder Other         Uncle      reports that he quit smoking about 43 years ago. His smoking use included cigarettes. He started smoking about 60 years ago. He has a 17 pack-year smoking history. He has never used smokeless tobacco. He reports that he does not drink alcohol and does not use drugs.    Allergies:  Allergies[1]    Current Medications:  Scheduled Meds:   [START ON 12/16/2024] metoprolol succinate  25 mg Oral Daily    ampicillin-sulbactam  1.5 g Intravenous q12h    insulin regular human  1-7 Units Subcutaneous 4 times per day    [Held by provider] gabapentin  300 mg Per G Tube BID    aspirin  81 mg Per G Tube Daily    lidocaine-menthol  2 patch Transdermal Daily    epoetin donna  10,000 Units Subcutaneous Once per day on Monday Wednesday Friday    nortriptyline  10 mg Oral Nightly    amiodarone  200 mg Per G Tube Daily    atorvastatin  40 mg Per G Tube Nightly     [Held by provider] clopidogrel  75 mg Oral Daily    fluticasone propionate  2 spray Nasal Daily    insulin degludec  5 Units Subcutaneous Nightly    lansoprazole  30 mg Per G Tube QAM AC    heparin  5,000 Units Subcutaneous Q8H STEPHANIE     Continuous Infusions:   dextrose 10%       PRN Meds:.  [Held by provider] LORazepam    heparin    lidocaine-prilocaine    dextrose 10%    lipase-protease-amylase (Lip-Prot-Amyl) **AND** sodium bicarbonate    metoprolol **OR** metoprolol    [Held by provider] HYDROmorphone **OR** [Held by provider] HYDROmorphone **OR** [Held by provider] HYDROmorphone    glucose **OR** glucose **OR** glucose-vitamin C **OR** dextrose **OR** glucose **OR** glucose **OR** glucose-vitamin C    acetaminophen    acetaminophen **OR** [DISCONTINUED] HYDROcodone-acetaminophen **OR** [DISCONTINUED] HYDROcodone-acetaminophen    polyethylene glycol (PEG 3350)    sennosides    bisacodyl    Exam:Blood pressure 105/49, pulse 74, temperature 98.8 °F (37.1 °C), temperature source Oral, resp. rate 18, height 5' 4\" (1.626 m), weight 165 lb 12.6 oz (75.2 kg), SpO2 100%.    Imaging:   Lumbar MRI  CONCLUSION:      1. Multilevel degenerative changes of the lumbar spine, which are described in detail above and are similar when compared to 02/07/2023.   2. At L4-L5, there is severe canal stenosis, severe bilateral subarticular zone stenosis, and moderate to severe bilateral foraminal narrowing.   3. Redemonstrated postoperative changes from prior decompressive laminectomies at L2-L4.   4. Grade 1 anterolisthesis of L4 on L5.   5. No acute fracture or traumatic listhesis of the lumbar spine.   6. Multifocal abnormal increased T2/STIR signal involving the paraspinal musculature, which may reflect atrophy, denervation, or less likely reflect myositis.   7. Circumferential bladder wall thickening, which may be secondary to underdistention, cystitis, or chronic outlet obstruction.   8. Lesser incidental findings described above.           Assessment:  79 year old male with past medical history of T2DM, HTN, HLD, ESRD on HD MWF via R AVF, CAD s/p PCI (5/2024), A.fib on AC, HFpEF, KRAIG, BPH, history of recurrent gastrointestinal bleeding, who presented with bilateral lower extremity pain.    Pain management was consulted for bilateral lower extremities pain in the setting of peripheral neuropathy. MRI was also done showing severe canal stenosis at L4-5. Patient pain is likely multifactorial from both diabetic neuropathy and spinal stenosis. History is more indicative of peripheral neuropathy pain.     Currently patient states his pain is moderately controlled on his current regimen.  He states the [place] helps his pain however it does make him tired.  He is on low-dose gabapentin given his renal failure.  He is also on nortriptyline 10 mg nightly.    Plan:  - Plavix being held for [facility]I on 12/17 Dr Sevilla for Severe spinal stenosis  -  severe confusion resolved .   -Continue lidocaine patch and nortiptyline   -Consider Ultram as poss less sedating for Pt  .- change Tylenol from prn to scheduled q 6         Total Time: 28 minutes     Lengthy discussion of risks, benefits, and alternative treatments were reviewed to patients satisfaction. All questions were answered. Patient agreeable to plan. Greater than 50% of the time was spent with counseling (nature of discussion centered around pain, therapy, and treatment options), face to face time, time spent reviewing data, obtaining patient information and discussing the care with the patients health care providers.     Chronic Pain Service [phone]         [1]   Allergies  Allergen Reactions    Adhesive Tape OTHER (SEE COMMENTS)     Severe rashes    Dust Mite Extract RASH

## 2024-12-15 NOTE — PROGRESS NOTES
[facility]  part of [facility]    Progress Note    Ollie Hernández Patient Status:  Inpatient    1945 MRN [id number]   Location [facility] 5SW/SE Attending Dee Joyce,*   Hosp Day # 9 PCP Wili Parmar MD     Chief Complaint: foot pain    Subjective:   Family at bedside,, today patient appears comfortable in bed states pain is 4 out of 10.  Denies any shortness of breath.  Remains on 2 L O2.  Wife at bedside plan of care discussed.      Objective:   Blood pressure 105/49, pulse 74, temperature 98.8 °F (37.1 °C), temperature source Oral, resp. rate 18, height 5' 4\" (1.626 m), weight 165 lb 12.6 oz (75.2 kg), SpO2 100%.  Physical Exam  Vitals and nursing note reviewed.   Constitutional:       General: He is not in acute distress.     Appearance: He is ill-appearing. He is not toxic-appearing or diaphoretic.   Cardiovascular:      Rate and Rhythm: Normal rate.      Pulses: Normal pulses.   Pulmonary:      Breath sounds: Rhonchi present. No wheezing.   Abdominal:      General: Bowel sounds are normal.      Palpations: Abdomen is soft.   Skin:     General: Skin is warm and dry.      Capillary Refill: Capillary refill takes less than 2 seconds.   Neurological:      General: No focal deficit present.      Mental Status: He is alert.   Psychiatric:         Behavior: Behavior normal.         Results:   Lab Results   Component Value Date    WBC 11.5 (H) 2024    HGB 8.0 (L) 2024    HCT 25.4 (L) 2024    .0 2024    CREATSERUM 2.92 (H) 2024    BUN 40 (H) 2024     2024    K 5.0 2024     2024    CO2 32.0 2024     (H) 2024    CA 9.6 2024    ALB 3.0 (L) 2024    ALKPHO 116 2024    BILT 0.3 2024    TP 5.7 2024    AST 32 2024    ALT 29 2024    PTT 30.1 2024    INR 1.45 (H) 2024    T4F 0.9 2022    TSH 2.844 2024     (H)  07/30/2024    PSA 2.94 10/20/2021    DDIMER 6.07 (H) 09/29/2024    ESRML 10 06/16/2024    CRP 1.30 (H) 06/16/2024    MG 2.2 12/11/2024    PHOS 4.0 12/11/2024    TROP <0.045 07/25/2019    TROPHS 52 12/03/2024     08/05/2023    B12 672 07/04/2024       CT BRAIN OR HEAD (CPT=70450)    Result Date: 12/14/2024  CONCLUSION: No acute intracranial abnormality.  Mild involutional and moderate chronic microvascular ischemic changes.    Dictated by (CST): Florentino Mari MD on 12/14/2024 at 5:10 PM     Finalized by (CST): Florentino Mari MD on 12/14/2024 at 5:13 PM          XR CHEST AP PORTABLE  (CPT=71045)    Result Date: 12/14/2024  CONCLUSION:   Enlarging right pleural effusion and increasing right lower lung opacity since the prior exam of 12/06/2024.  Superimposed infection is suspected.  Background pulmonary edema is again seen.  Unchanged cardiomegaly, aortic atherosclerosis, thoracic spondylosis, and bilateral shoulder degenerative joint disease.     Dictated by (CST): Florentino Mari MD on 12/14/2024 at 1:39 PM     Finalized by (CST): Florentino Mari MD on 12/14/2024 at 1:42 PM               Assessment & Plan:         Peripheral polyneuropathy; checked rpr ok; thyroid; electropheresis; b12 ok 7/24  -Patient p/w bilateral lower extremity pain  -Significantly worse over the past 3 to 4 days.  -No real improvement with gabapentin at home  -Difficulties with ambulation because of pain  -Pain control as able; pt screaming in pain or asleep; pain clinic saw; await mri  poss steroid   -PT/OT  -Continue to monitor  -MRI results reviewed   -12/14: Discussed with pain service in view of increased confusion and hypoxia will discontinue all narcotics at this time, if pain ensues consider PCA for better autoregulation.  Hopefully patient can still be plan for WILLIAM on 12/17/2024 which is when he would be 7 days away from Plavix.  Appreciate pain service    Acute respiratory failure, confusion/hypoxia: Significant event  12/14/2020/probable aspiration pneumonia  Possibly secondary to narcotics although concern for aspiration versus pneumonia  Chest x-ray consistent with some pulmonary edema and questionable underlying pneumonia  CT head no acute findings  ABG reviewed no CO2 narcosis  IV Unasyn initiated  O2 support as needed, wean as able  Continue to monitor     ESRD on HD  -Normally receives dialysis on Monday Wednesday Friday.  Last session was on Wednesday.  -Nephrology consulted, further HD per nephrology.     Anemia of chronic renal disease and iron defy   -checked stool for blood, neg;  -Likely secondary to ESRD as above  -Hemoglobin noted to be 7.1  -Continue to monitor     Type 2 DM:  - Monitoring Blood glucose with POC checks  - SSI to cover hyperglycemia  - Hypoglycemia protocol  - Will monitor and adjust agents as needed.  -Patient has appoint with Dr. Sevilla on 12/17/2024.  If patient does get epidural steroid injection that day I will reach out to her to see if she needs to make any adjustments on patient's diabetic management.     -KRAIG  -Hypertension  -Chronic dCHF  -Pulm HTN    Afib   has watchman dr romano put in in 9/2024; mri ok with watchman per dr coffey  12/13: Patient had episode of asymptomatic transient RVR self-limited.         Cad with left circ stent 5/24 poss need steroid inj; will hold and notify cards dr gale    Patient will require inpatient services that will reasonably be expected to span two midnight's based on the clinical documentation in H+P.   Based on patients current state of illness, I anticipate that, after discharge, patient will require TBD.  Complex mdm; coordinating care with nurse/duly cards and counseling pt and with his permission his wife on phone about labs/neuropathy/mri/plavix/stent

## 2024-12-15 NOTE — PLAN OF CARE
Fluid & electrolyte imbalances educated by HD RN & reinforced by this RN.    Problem: Patient Centered Care  Goal: Patient preferences are identified and integrated in the patient's plan of care  Description: Interventions:  - What would you like us to know as we care for you? I am from home with my wife  - Provide timely, complete, and accurate information to patient/family  - Incorporate patient and family knowledge, values, beliefs, and cultural backgrounds into the planning and delivery of care  - Encourage patient/family to participate in care and decision-making at the level they choose  - Honor patient and family perspectives and choices  Outcome: Progressing     Problem: Diabetes/Glucose Control  Goal: Glucose maintained within prescribed range  Description: INTERVENTIONS:  - Monitor Blood Glucose as ordered  - Assess for signs and symptoms of hyperglycemia and hypoglycemia  - Administer ordered medications to maintain glucose within target range  - Assess barriers to adequate nutritional intake and initiate nutrition consult as needed  - Instruct patient on self management of diabetes  Outcome: Progressing     Problem: Patient/Family Goals  Goal: Patient/Family Long Term Goal  Description: Patient's Long Term Goal: Discharge    Interventions:  - Monitor vitals  - Monitor appropriate labs  - Administer medications as ordered  - Follow MD's orders  - Update patient on plan of care   - Discharge planning     - See additional Care Plan goals for specific interventions  Outcome: Progressing  Goal: Patient/Family Short Term Goal  Description: Patient's Short Term Goal:     Interventions:   -   - See additional Care Plan goals for specific interventions  Outcome: Progressing     Problem: MUSCULOSKELETAL - ADULT  Goal: Return mobility to safest level of function  Description: INTERVENTIONS:  - Assess patient stability and activity tolerance for standing, transferring and ambulating w/ or w/o assistive devices  -  Assist with transfers and ambulation using safe patient handling equipment as needed  - Ensure adequate protection for wounds/incisions during mobilization  - Obtain PT/OT consults as needed  - Advance activity as appropriate  - Communicate ordered activity level and limitations with patient/family  Outcome: Progressing     Problem: PAIN - ADULT  Goal: Verbalizes/displays adequate comfort level or patient's stated pain goal  Description: INTERVENTIONS:  - Encourage pt to monitor pain and request assistance  - Assess pain using appropriate pain scale  - Administer analgesics based on type and severity of pain and evaluate response  - Implement non-pharmacological measures as appropriate and evaluate response  - Consider cultural and social influences on pain and pain management  - Manage/alleviate anxiety  - Utilize distraction and/or relaxation techniques  - Monitor for opioid side effects  - Notify MD/LIP if interventions unsuccessful or patient reports new pain  - Anticipate increased pain with activity and pre-medicate as appropriate  Outcome: Progressing     Problem: RISK FOR INFECTION - ADULT  Goal: Absence of fever/infection during anticipated neutropenic period  Description: INTERVENTIONS  - Monitor WBC  - Administer growth factors as ordered  - Implement neutropenic guidelines  Outcome: Progressing     Problem: SAFETY ADULT - FALL  Goal: Free from fall injury  Description: INTERVENTIONS:  - Assess pt frequently for physical needs  - Identify cognitive and physical deficits and behaviors that affect risk of falls.  - [place] fall precautions as indicated by assessment.  - Educate pt/family on patient safety including physical limitations  - Instruct pt to call for assistance with activity based on assessment  - Modify environment to reduce risk of injury  - Provide assistive devices as appropriate  - Consider OT/PT consult to assist with strengthening/mobility  - Encourage toileting schedule  Outcome:  Progressing     Problem: DISCHARGE PLANNING  Goal: Discharge to home or other facility with appropriate resources  Description: INTERVENTIONS:  - Identify barriers to discharge w/pt and caregiver  - Include patient/family/discharge partner in discharge planning  - Arrange for needed discharge resources and transportation as appropriate  - Identify discharge learning needs (meds, wound care, etc)  - Arrange for interpreters to assist at discharge as needed  - Consider post-discharge preferences of patient/family/discharge partner  - Complete POLST form as appropriate  - Assess patient's ability to be responsible for managing their own health  - Refer to Case Management Department for coordinating discharge planning if the patient needs post-hospital services based on physician/LIP order or complex needs related to functional status, cognitive ability or social support system  Outcome: Progressing     Problem: METABOLIC/FLUID AND ELECTROLYTES - ADULT  Goal: Glucose maintained within prescribed range  Description: INTERVENTIONS:  - Monitor Blood Glucose as ordered  - Assess for signs and symptoms of hyperglycemia and hypoglycemia  - Administer ordered medications to maintain glucose within target range  - Assess barriers to adequate nutritional intake and initiate nutrition consult as needed  - Instruct patient on self management of diabetes  Outcome: Progressing  Goal: Electrolytes maintained within normal limits  Description: INTERVENTIONS:  - Monitor labs and rhythm and assess patient for signs and symptoms of electrolyte imbalances  - Administer electrolyte replacement as ordered  - Monitor response to electrolyte replacements, including rhythm and repeat lab results as appropriate  - Fluid restriction as ordered  - Instruct patient on fluid and nutrition restrictions as appropriate  Outcome: Progressing  Goal: Hemodynamic stability and optimal renal function maintained  Description: INTERVENTIONS:  - Monitor labs  and assess for signs and symptoms of volume excess or deficit  - Monitor intake, output and patient weight  - Monitor urine specific gravity, serum osmolarity and serum sodium as indicated or ordered  - Monitor response to interventions for patient's volume status, including labs, urine output, blood pressure (other measures as available)  - Encourage oral intake as appropriate  - Instruct patient on fluid and nutrition restrictions as appropriate  Outcome: Progressing

## 2024-12-15 NOTE — PLAN OF CARE
Problem: Patient Centered Care  Goal: Patient preferences are identified and integrated in the patient's plan of care  Description: Interventions:  - What would you like us to know as we care for you? I am from home with my wife  - Provide timely, complete, and accurate information to patient/family  - Incorporate patient and family knowledge, values, beliefs, and cultural backgrounds into the planning and delivery of care  - Encourage patient/family to participate in care and decision-making at the level they choose  - Honor patient and family perspectives and choices  Outcome: Progressing     Problem: Diabetes/Glucose Control  Goal: Glucose maintained within prescribed range  Description: INTERVENTIONS:  - Monitor Blood Glucose as ordered  - Assess for signs and symptoms of hyperglycemia and hypoglycemia  - Administer ordered medications to maintain glucose within target range  - Assess barriers to adequate nutritional intake and initiate nutrition consult as needed  - Instruct patient on self management of diabetes  Outcome: Progressing     Problem: Patient/Family Goals  Goal: Patient/Family Long Term Goal  Description: Patient's Long Term Goal: Discharge    Interventions:  - Monitor vitals  - Monitor appropriate labs  - Administer medications as ordered  - Follow MD's orders  - Update patient on plan of care   - Discharge planning     - See additional Care Plan goals for specific interventions  Outcome: Progressing  Goal: Patient/Family Short Term Goal  Description: Patient's Short Term Goal:     Interventions:   -   - See additional Care Plan goals for specific interventions  Outcome: Progressing     Problem: Patient/Family Goals  Goal: Patient/Family Short Term Goal  Description: Patient's Short Term Goal:     Interventions:   -   - See additional Care Plan goals for specific interventions  Outcome: Progressing     Problem: Patient/Family Goals  Goal: Patient/Family Short Term Goal  Description: Patient's  Short Term Goal:     Interventions:   -   - See additional Care Plan goals for specific interventions  Outcome: Progressing     Problem: MUSCULOSKELETAL - ADULT  Goal: Return mobility to safest level of function  Description: INTERVENTIONS:  - Assess patient stability and activity tolerance for standing, transferring and ambulating w/ or w/o assistive devices  - Assist with transfers and ambulation using safe patient handling equipment as needed  - Ensure adequate protection for wounds/incisions during mobilization  - Obtain PT/OT consults as needed  - Advance activity as appropriate  - Communicate ordered activity level and limitations with patient/family  Outcome: Progressing     Problem: METABOLIC/FLUID AND ELECTROLYTES - ADULT  Goal: Glucose maintained within prescribed range  Description: INTERVENTIONS:  - Monitor Blood Glucose as ordered  - Assess for signs and symptoms of hyperglycemia and hypoglycemia  - Administer ordered medications to maintain glucose within target range  - Assess barriers to adequate nutritional intake and initiate nutrition consult as needed  - Instruct patient on self management of diabetes  Outcome: Progressing  Goal: Electrolytes maintained within normal limits  Description: INTERVENTIONS:  - Monitor labs and rhythm and assess patient for signs and symptoms of electrolyte imbalances  - Administer electrolyte replacement as ordered  - Monitor response to electrolyte replacements, including rhythm and repeat lab results as appropriate  - Fluid restriction as ordered  - Instruct patient on fluid and nutrition restrictions as appropriate  Outcome: Progressing  Goal: Hemodynamic stability and optimal renal function maintained  Description: INTERVENTIONS:  - Monitor labs and assess for signs and symptoms of volume excess or deficit  - Monitor intake, output and patient weight  - Monitor urine specific gravity, serum osmolarity and serum sodium as indicated or ordered  - Monitor response to  interventions for patient's volume status, including labs, urine output, blood pressure (other measures as available)  - Encourage oral intake as appropriate  - Instruct patient on fluid and nutrition restrictions as appropriate  Outcome: Progressing     Problem: PAIN - ADULT  Goal: Verbalizes/displays adequate comfort level or patient's stated pain goal  Description: INTERVENTIONS:  - Encourage pt to monitor pain and request assistance  - Assess pain using appropriate pain scale  - Administer analgesics based on type and severity of pain and evaluate response  - Implement non-pharmacological measures as appropriate and evaluate response  - Consider cultural and social influences on pain and pain management  - Manage/alleviate anxiety  - Utilize distraction and/or relaxation techniques  - Monitor for opioid side effects  - Notify MD/LIP if interventions unsuccessful or patient reports new pain  - Anticipate increased pain with activity and pre-medicate as appropriate  Outcome: Progressing     Problem: RISK FOR INFECTION - ADULT  Goal: Absence of fever/infection during anticipated neutropenic period  Description: INTERVENTIONS  - Monitor WBC  - Administer growth factors as ordered  - Implement neutropenic guidelines  Outcome: Progressing     Problem: SAFETY ADULT - FALL  Goal: Free from fall injury  Description: INTERVENTIONS:  - Assess pt frequently for physical needs  - Identify cognitive and physical deficits and behaviors that affect risk of falls.  - [place] fall precautions as indicated by assessment.  - Educate pt/family on patient safety including physical limitations  - Instruct pt to call for assistance with activity based on assessment  - Modify environment to reduce risk of injury  - Provide assistive devices as appropriate  - Consider OT/PT consult to assist with strengthening/mobility  - Encourage toileting schedule  Outcome: Progressing     Problem: DISCHARGE PLANNING  Goal: Discharge to home or  other facility with appropriate resources  Description: INTERVENTIONS:  - Identify barriers to discharge w/pt and caregiver  - Include patient/family/discharge partner in discharge planning  - Arrange for needed discharge resources and transportation as appropriate  - Identify discharge learning needs (meds, wound care, etc)  - Arrange for interpreters to assist at discharge as needed  - Consider post-discharge preferences of patient/family/discharge partner  - Complete POLST form as appropriate  - Assess patient's ability to be responsible for managing their own health  - Refer to Case Management Department for coordinating discharge planning if the patient needs post-hospital services based on physician/LIP order or complex needs related to functional status, cognitive ability or social support system  Outcome: Progressing

## 2024-12-16 LAB
ANION GAP SERPL CALC-SCNC: 5 MMOL/L (ref 0–18)
ATRIAL RATE: 79 BPM
BASOPHILS # BLD AUTO: 0.03 X10(3) UL (ref 0–0.2)
BASOPHILS NFR BLD AUTO: 0.4 %
BUN BLD-MCNC: 69 MG/DL (ref 9–23)
BUN/CREAT SERPL: 15.7 (ref 10–20)
CALCIUM BLD-MCNC: 9.6 MG/DL (ref 8.7–10.4)
CHLORIDE SERPL-SCNC: 97 MMOL/L (ref 98–112)
CO2 SERPL-SCNC: 33 MMOL/L (ref 21–32)
CREAT BLD-MCNC: 4.39 MG/DL
DEPRECATED RDW RBC AUTO: 64.4 FL (ref 35.1–46.3)
EGFRCR SERPLBLD CKD-EPI 2021: 13 ML/MIN/1.73M2 (ref 60–?)
EOSINOPHIL # BLD AUTO: 0.22 X10(3) UL (ref 0–0.7)
EOSINOPHIL NFR BLD AUTO: 2.6 %
ERYTHROCYTE [DISTWIDTH] IN BLOOD BY AUTOMATED COUNT: 17.8 % (ref 11–15)
GLUCOSE BLD-MCNC: 123 MG/DL (ref 70–99)
GLUCOSE BLDC GLUCOMTR-MCNC: 127 MG/DL (ref 70–99)
GLUCOSE BLDC GLUCOMTR-MCNC: 172 MG/DL (ref 70–99)
GLUCOSE BLDC GLUCOMTR-MCNC: 180 MG/DL (ref 70–99)
GLUCOSE BLDC GLUCOMTR-MCNC: 228 MG/DL (ref 70–99)
HCT VFR BLD AUTO: 25.1 %
HGB BLD-MCNC: 7.6 G/DL
IMM GRANULOCYTES # BLD AUTO: 0.05 X10(3) UL (ref 0–1)
IMM GRANULOCYTES NFR BLD: 0.6 %
LYMPHOCYTES # BLD AUTO: 1.16 X10(3) UL (ref 1–4)
LYMPHOCYTES NFR BLD AUTO: 13.6 %
MCH RBC QN AUTO: 30.2 PG (ref 26–34)
MCHC RBC AUTO-ENTMCNC: 30.3 G/DL (ref 31–37)
MCV RBC AUTO: 99.6 FL
MONOCYTES # BLD AUTO: 0.69 X10(3) UL (ref 0.1–1)
MONOCYTES NFR BLD AUTO: 8.1 %
NEUTROPHILS # BLD AUTO: 6.35 X10 (3) UL (ref 1.5–7.7)
NEUTROPHILS # BLD AUTO: 6.35 X10(3) UL (ref 1.5–7.7)
NEUTROPHILS NFR BLD AUTO: 74.7 %
OSMOLALITY SERPL CALC.SUM OF ELEC: 301 MOSM/KG (ref 275–295)
P AXIS: 31 DEGREES
P-R INTERVAL: 152 MS
PLATELET # BLD AUTO: 221 10(3)UL (ref 150–450)
POTASSIUM SERPL-SCNC: 5.1 MMOL/L (ref 3.5–5.1)
Q-T INTERVAL: 382 MS
QRS DURATION: 78 MS
QTC CALCULATION (BEZET): 438 MS
R AXIS: 0 DEGREES
RBC # BLD AUTO: 2.52 X10(6)UL
SODIUM SERPL-SCNC: 135 MMOL/L (ref 136–145)
T AXIS: 165 DEGREES
TROPONIN I SERPL HS-MCNC: 11 NG/L
VENTRICULAR RATE: 79 BPM
WBC # BLD AUTO: 8.5 X10(3) UL (ref 4–11)

## 2024-12-16 PROCEDURE — 99233 SBSQ HOSP IP/OBS HIGH 50: CPT | Performed by: INTERNAL MEDICINE

## 2024-12-16 RX ORDER — METOPROLOL TARTRATE 50 MG
50 TABLET ORAL
Status: DISCONTINUED | OUTPATIENT
Start: 2024-12-16 | End: 2024-12-18

## 2024-12-16 RX ORDER — METOPROLOL TARTRATE 25 MG/1
25 TABLET, FILM COATED ORAL
Status: DISCONTINUED | OUTPATIENT
Start: 2024-12-16 | End: 2024-12-16

## 2024-12-16 RX ORDER — ALBUMIN (HUMAN) 12.5 G/50ML
25 SOLUTION INTRAVENOUS
Status: ACTIVE | OUTPATIENT
Start: 2024-12-17 | End: 2024-12-18

## 2024-12-16 RX ORDER — IPRATROPIUM BROMIDE AND ALBUTEROL SULFATE 2.5; .5 MG/3ML; MG/3ML
3 SOLUTION RESPIRATORY (INHALATION) EVERY 4 HOURS PRN
Status: DISCONTINUED | OUTPATIENT
Start: 2024-12-16 | End: 2024-12-23

## 2024-12-16 RX ORDER — TRAMADOL HYDROCHLORIDE 50 MG/1
50 TABLET ORAL EVERY 12 HOURS PRN
Status: DISCONTINUED | OUTPATIENT
Start: 2024-12-16 | End: 2024-12-23

## 2024-12-16 NOTE — CONSULTS
Cardiology Consultation  [facility]    Ollie Hernández Patient Status:  Inpatient    1945 MRN [id number]   Location [facility] 5SW/SE Attending Lizbeth Rey MD   Hosp Day # 10 PCP Wili Parmar MD     Primary Cardiologist: Dr. Phelan    Reason for Consultation:  Afib    History of Present Illness:  Ollie Hernández is a a(n) 79 year old male with  afib, CAD, T2DM, ESRD, HTN, HDL, neuropathy who underwent Watchman on 24 at [facility] here for polyneuropathy.  Has been having episodes of afib with RVR during dialysis.  Recently had metoprolol dose decreased for softer pressures. BP in the 140's mmHg now.  Had some atypical chest tightness-feels like it's more like SOB. Hstrop normal.  EKG without ST changes.      Assessment/Plan:  Afib s/p Watchman 24 (24mm Watchman FLX )  CAD s/p PCI LCX with Ramin Alpine ABIMBOLA x 1 (24)   HFpEF  ESRD on iHD  T2DM  KRAIG  HTN  Polyneuropathy, needing MRI    - plavix being held, continue ASA  - previously had hypotension so metoprolol dose was dropped. May be why rates are worse. BP is in the 140's now, so will increase.  - increase metoprolol to 50mg BID  - IV 5mg prn if needed during dialysis.   - TTE ordered. No recent echo since 2019  - Tele  - Will continue to follow.    History:  Past Medical History:    Anemia    Asthma (HCC)    Back problem    BPH (benign prostatic hyperplasia)    Calculus of kidney    Cataract    Coronary atherosclerosis    Diabetes (HCC)    ESRD (end stage renal disease) on dialysis (HCC)    Essential hypertension    High blood pressure    High cholesterol    History of blood transfusion    Hyperlipidemia    Neuropathy    hands and feet    KRAIG on CPAP    Renal disorder    Sleep apnea    Visual impairment    glasses    Vocal cord paralysis, unilateral partial     Past Surgical History:   Procedure Laterality Date    Appendectomy          Back surgery      Neck/back -     Capsule endoscopy - internal referral       Cataract extraction Right 01/08/2022    PHACOEMULSIFICATION WITH POSTERIOR CHAMBER INTRAOCULAR LENS, RIGHT EYE    Cataract extraction Left 12/21/2021    PHACOEMULSIFICATION WITH POSTERIOR CHAMBER INTRAOCULAR LENS, LEFT EYE    Cath bare metal stent (bms)      Cath drug eluting stent  05/21/2024    Successful IVUS guided PCI of the circumflex with a ABIMBOLA    Colonoscopy      Colonoscopy N/A 01/25/2021    Procedure: COLONOSCOPY;  Surgeon: Michael Gautam MD;  Location: [facility] ENDO    Colonoscopy N/A 06/03/2024    Procedure: COLONOSCOPY;  Surgeon: Gabriel Saldana MD;  Location: [facility] ENDOSCOPY    Colonoscopy N/A 06/15/2024    Procedure: COLONOSCOPY;  Surgeon: Carlyn Storey MD;  Location: [facility] ENDOSCOPY    Colonoscopy N/A 07/31/2024    Procedure: COLONOSCOPY;  Surgeon: Gabriel Saldana MD;  Location: [facility] ENDOSCOPY    Dialysis procedure  02/17/2023    right internal jugular tunneled dialysis catheter placement.    Hand/finger surgery unlisted      Accidental trauma    Spine surgery procedure unlisted      Total hip arthroplasty Left 10/04/2024    Left anterior total hip arthroplasty    Upper gi endoscopy,diagnosis       Family History   Problem Relation Age of Onset    Cancer Father         Kidney    Breast Cancer Mother     Diabetes Mother     Diabetes Maternal Grandmother     Diabetes Maternal Grandfather     Heart Disorder Other         Uncle      reports that he quit smoking about 43 years ago. His smoking use included cigarettes. He started smoking about 61 years ago. He has a 17 pack-year smoking history. He has never used smokeless tobacco. He reports that he does not drink alcohol and does not use drugs.    Allergies:  Allergies[1]    Medications:  Medications Ordered Prior to Encounter[2]    Review of Systems:  As above, otherwise negative.      Physical Exam:  Blood pressure 140/55, pulse 80, temperature 98.4 °F (36.9 °C), temperature source Oral, resp. rate 16, height 5' 4\" (1.626 m), weight 141 lb 15.6 oz (64.4  kg), SpO2 97%.  Wt Readings from Last 3 Encounters:   12/16/24 141 lb 15.6 oz (64.4 kg)   11/30/24 145 lb (65.8 kg)   10/22/24 132 lb 14.4 oz (60.3 kg)       General: awake, alert, oriented x 3, no acute distress  HEENT: at/nc, perrl, eomi  Neck: No JVD, carotids 2+ no bruits.  Cardiac: Regular rate and rhythm, S1, S2 normal, no murmur, rub or gallop.  Lungs: Clear without wheezes, rales, rhonchi or dullness.  Normal excursions and effort.  Abdomen: Soft, non-tender, non-distended, normal bowel sounds   Extremities: Without clubbing, cyanosis or edema.  Peripheral pulses are 2+.  Neurologic: Alert and oriented, normal affect.  Psych: normal mood and affect  Skin: Warm and dry.       Laboratories and Data:  Diagnostics:      Labs:   CBC:    Lab Results   Component Value Date    WBC 8.5 12/16/2024    WBC 11.5 (H) 12/14/2024    WBC 11.1 (H) 12/11/2024     Lab Results   Component Value Date    HGB 7.6 (L) 12/16/2024    HGB 8.0 (L) 12/14/2024    HGB 8.7 (L) 12/11/2024      Lab Results   Component Value Date    .0 12/16/2024    .0 12/14/2024    .0 12/11/2024     BMP:   No results found for: \"GLUCOSE\"  Lab Results   Component Value Date    K 5.1 12/16/2024    K 5.0 12/14/2024    K 4.7 12/11/2024     Lab Results   Component Value Date    BUN 69 (H) 12/16/2024    BUN 40 (H) 12/14/2024    BUN 35 (H) 12/11/2024     Lab Results   Component Value Date    CREATSERUM 4.39 (H) 12/16/2024    CREATSERUM 2.92 (H) 12/14/2024    CREATSERUM 3.35 (H) 12/11/2024     Cholesterol:     Lab Results   Component Value Date    CHOLEST 179 09/29/2024    CHOLEST 146 07/31/2024    CHOLEST 153 07/04/2024     Lab Results   Component Value Date    HDL 41 09/29/2024    HDL 29 (L) 07/31/2024    HDL 41 07/04/2024     Lab Results   Component Value Date    TRIG 259 (H) 10/17/2024    TRIG 160 (H) 10/04/2024    TRIG 206 (H) 10/02/2024     Lab Results   Component Value Date     (H) 09/29/2024    LDL 93 07/31/2024     (H)  2024     Lab Results   Component Value Date    AST 32 2024    AST 42 (H) 2024    AST 71 (H) 2024     Lab Results   Component Value Date    ALT 29 2024    ALT 38 2024    ALT 57 (H) 2024           Luis Orozco MD  Interventional Cardiology  [place]  2024  4:45 PM         [1]   Allergies  Allergen Reactions    Adhesive Tape OTHER (SEE COMMENTS)     Severe rashes    Dust Mite Extract RASH   [2]   Current Facility-Administered Medications on File Prior to Encounter   Medication Dose Route Frequency Provider Last Rate Last Admin    [COMPLETED] acetaminophen (Tylenol) 160 MG/5ML oral liquid 500 mg  500 mg Per G Tube Once Gabriela Allen MD   500 mg at 24 0209    [COMPLETED] gabapentin (Neurontin) 250 MG/5ML oral solution 300 mg  300 mg Per G Tube Once Gabriela Allen MD   300 mg at 24 0440    [COMPLETED] ceFAZolin (Ancef) 2g in 10mL IV syringe premix  2 g Intravenous Once Yash Sheppard MD   2 g at 10/19/24 1036    [] adult 3 in 1 TPN   Intravenous Continuous TPN Pranay Michel MD   Paused at 10/19/24 0250    [COMPLETED] sodium chloride 0.9 % IV bolus 250 mL  250 mL Intravenous Once Lorelei Mata  mL/hr at 10/17/24 0023 250 mL at 10/17/24 0023    [] adult 3 in 1 TPN   Intravenous Continuous TPN Pranay Michel MD 50 mL/hr at 10/17/24 2233 New Bag at 10/17/24 2233    [COMPLETED] dilTIAZem (cardIZEM) 25 mg/5mL injection 10 mg  10 mg Intravenous Once Pranay Michel MD   10 mg at 10/17/24 1330    [COMPLETED] sodium chloride 0.9 % IV bolus 250 mL  250 mL Intravenous Once Pranay Michel  mL/hr at 10/17/24 1700 250 mL at 10/17/24 1700    [COMPLETED] amiodarone (Cordarone) 150 mg in dextrose 5% 100 mL IV bolus  150 mg Intravenous Once Emerson Julio  mL/hr at 10/17/24 1805 150 mg at 10/17/24 180    [COMPLETED] digoxin (Lanoxin) 250 MCG/ML injection 250 mcg  250 mcg Intravenous Once Herman Phelan MD   250 mcg at 10/17/24 1757     [] sodium chloride 0.9 % IV bolus 100 mL  100 mL Intravenous Q30 Min PRN José Callahan MD        And    [] albumin human (Albumin) 25% injection 25 g  25 g Intravenous PRN Dialysis José Callahan MD        [] dextrose 5%-sodium chloride 0.45% infusion   Intravenous Continuous Lorelei Mata MD 50 mL/hr at 10/16/24 0038 New Bag at 10/16/24 0038    [] adult 3 in 1 TPN   Intravenous Continuous TPN Pranay Michel MD 50 mL/hr at 10/16/24 2118 New Bag at 10/16/24 2118    [COMPLETED] metoprolol (Lopressor) 5 mg/5mL injection 5 mg  5 mg Intravenous Once Herman Phelan MD   5 mg at 10/16/24 2307    [] sodium chloride 0.9 % IV bolus 100 mL  100 mL Intravenous Q30 Min PRN José Callahan MD        And    [] albumin human (Albumin) 25% injection 25 g  25 g Intravenous PRN Dialysis José Callahan MD        [] sodium chloride 0.9 % IV bolus 100 mL  100 mL Intravenous Q30 Min PRN Walker Klein MD        And    [] albumin human (Albumin) 25% injection 25 g  25 g Intravenous PRN Dialysis Walker Klein MD        [COMPLETED] metoprolol (Lopressor) 5 mg/5mL injection 5 mg  5 mg Intravenous Once Radha Dunham MD   5 mg at 10/12/24 0304    [COMPLETED] methylPREDNISolone sodium succinate (Solu-MEDROL) injection 40 mg  40 mg Intravenous BID Jayjay Mijares MD   40 mg at 10/13/24 0919    [] sodium chloride 0.9 % IV bolus 100 mL  100 mL Intravenous Q30 Min PRN Walker Klein MD        And    [] albumin human (Albumin) 25% injection 25 g  25 g Intravenous PRN Dialysis Walker Klein MD   25 g at 10/11/24 1900    [COMPLETED] methylPREDNISolone sodium succinate (Solu-MEDROL) injection 60 mg  60 mg Intravenous Once Jimmie Landeros MD   60 mg at 10/09/24 0148    [COMPLETED] EPINEPHrine-racemic (S-2) 2.25 % nebulizer solution 0.5 mL  0.5 mL Nebulization Once Jimmie Landeros MD   0.5 mL at 10/09/24 0240    [] sodium chloride  0.9 % IV bolus 100 mL  100 mL Intravenous Q30 Min PRN Moni Pugh MD        And    [] albumin human (Albumin) 25% injection 25 g  25 g Intravenous PRN Dialysis Moni Pugh MD        [COMPLETED] methylPREDNISolone sodium succinate (Solu-MEDROL) injection 125 mg  125 mg Intravenous Once Gordon Borden MD   125 mg at 10/06/24 1823    [COMPLETED] EPINEPHrine-racemic (S-2) 2.25 % nebulizer solution 0.5 mL  0.5 mL Nebulization Once Ollie Santos MD   0.5 mL at 10/06/24 1812    [] methylPREDNISolone sodium succinate (Solu-MEDROL) 125 MG injection             [COMPLETED] potassium chloride (Klor-Con) 20 MEQ oral powder 20 mEq  20 mEq Oral Once Moni Pugh MD   20 mEq at 10/05/24 0556    [COMPLETED] fluconazole (Diflucan) tab 200 mg  200 mg Oral Once Lizbeth Rey MD   200 mg at 10/05/24 1610    [COMPLETED] potassium chloride (Klor-Con) 20 MEQ oral powder 10 mEq  10 mEq Oral Once Moni Pugh MD   10 mEq at 10/04/24 0736    [] ondansetron (Zofran) 4 MG/2ML injection 4 mg  4 mg Intravenous Once PRN Talha Gregg MD        [] prochlorperazine (Compazine) 10 MG/2ML injection 5 mg  5 mg Intravenous Once PRN Talha Gregg MD        [] haloperidol lactate (Haldol) 5 MG/ML injection 0.25 mg  0.25 mg Intravenous Once PRN Talha Gregg MD        [] fentaNYL (Sublimaze) 50 mcg/mL injection 25 mcg  25 mcg Intravenous Q5 Min PRN Talha Gregg MD   25 mcg at 10/04/24 1411    [] fentaNYL (Sublimaze) 50 mcg/mL injection 50 mcg  50 mcg Intravenous Q5 Min PRN Talha Gregg MD        [] HYDROmorphone (Dilaudid) 1 MG/ML injection 0.2 mg  0.2 mg Intravenous Q15 Min PRN Talha Gregg MD        [] HYDROmorphone (Dilaudid) 1 MG/ML injection 0.2 mg  0.2 mg Intravenous Q15 Min PRN Talha Gregg MD        [] HYDROmorphone (Dilaudid) 1 MG/ML injection 0.2 mg  0.2 mg Intravenous Q15 Min PRN Talha Gregg MD        [] atropine 0.1 MG/ML  injection 0.5 mg  0.5 mg Intravenous PRN Talha Gregg MD        [] naloxone (Narcan) 0.4 MG/ML injection 0.08 mg  0.08 mg Intravenous PRN Talha Gregg MD        [] sodium chloride 0.9 % IV bolus 500 mL  500 mL Intravenous Once PRN Humberto Murphy MD        [] diphenhydrAMINE (Benadryl) 50 mg/mL  injection 25 mg  25 mg Intravenous Once PRN Humberto Murphy MD        [COMPLETED] ceFAZolin (Ancef) 1 g in dextrose 5% 100mL IVPB-ADD  1 g Intravenous Q24H Humberto Murphy  mL/hr at 10/05/24 0833 1 g at 10/05/24 0833    [COMPLETED] clonidine-EPINEPHrine-ropivacaine-ketorolac (CERTS) (Duraclon-Adrenalin-Naropin-Toradol) pain cocktail irrigation   Intra-articular Once (Intra-Op) Humberto Murphy MD   Given at 10/04/24 1211    [] sodium chloride 0.9 % IV bolus 100 mL  100 mL Intravenous Q30 Min PRN Humberto Murphy MD        And    [] albumin human (Albumin) 25% injection 25 g  25 g Intravenous PRN Dialysis Humberto Murphy MD        [COMPLETED] tranexamic acid in sodium chloride 0.7% (Cyklokapron) 1000 mg/100mL infusion premix 1,000 mg  1,000 mg Intravenous 30 Min Pre-Op Humberto Murphy MD   1,000 mg at 10/04/24 1149    [COMPLETED] potassium chloride (Klor-Con) 20 MEQ oral powder 20 mEq  20 mEq Oral Once Mirta Redman MD   20 mEq at 10/02/24 2024    [COMPLETED] sodium chloride 0.9 % IV bolus 250 mL  250 mL Intravenous Once Nora Jones APRN   Stopped at 10/01/24 1150    [] sodium chloride 0.9 % IV bolus 100 mL  100 mL Intravenous Q30 Min PRN Moni Pugh MD        And    [] albumin human (Albumin) 25% injection 25 g  25 g Intravenous PRN Dialysis Moni Pugh MD        [] piperacillin-tazobactam (Zosyn) 3.375 g in dextrose 5% 100 mL IVPB-ADDV  3.375 g Intravenous Q12H Jimmie Landeros MD 25 mL/hr at 10/04/24 1900 3.375 g at 10/04/24 190    [COMPLETED] iopamidol 76% (ISOVUE-370) injection for power injector  80 mL Intravenous ONCE PRN Harvey,  MD Radha   80 mL at 24 1126    [COMPLETED] furosemide (Lasix) 10 mg/mL injection 40 mg  40 mg Intravenous Once Nick Moreau MD   40 mg at 24 0607    [] furosemide (Lasix) 10 mg/mL injection             [COMPLETED] hydrALAzine (Apresoline) 20 mg/mL injection 10 mg  10 mg Intravenous Once Nick Moreau MD   10 mg at 24 0626    [] etomidate (Amidate) 2 mg/mL injection             [COMPLETED] propofol (Diprivan) 10 MG/ML injection        1,000 mg at 24 1436    [COMPLETED] piperacillin-tazobactam (Zosyn) 4.5 g in dextrose 5% 100 mL IVPB-ADDV  4.5 g Intravenous Once Nick Moreau  mL/hr at 24 0853 4.5 g at 24 0853    [] sodium chloride 0.9 % IV bolus 100 mL  100 mL Intravenous Q30 Min PRN José Callahan MD        [COMPLETED] nitroGLYCERIN (Nitrobid) 2 % ointment 1 inch  1 inch Topical Once Nick Moreau MD   1 inch at 24 0654    [COMPLETED] hydrALAzine (Apresoline) 20 mg/mL injection 10 mg  10 mg Intravenous Once Nick Moreau MD   10 mg at 24 0654    [COMPLETED] ondansetron (Zofran) 4 MG/2ML injection        4 mg at 24 1241    [COMPLETED] metoclopramide (Reglan) 5 mg/mL injection 5 mg  5 mg Intravenous Once Karla Irvin APRN   5 mg at 24 1300    [COMPLETED] norepinephrine (Levophed) 4 mg/250mL infusion premix        4,000 mcg at 24 1455    [COMPLETED] succinylcholine (Anectine) 20 MG/ML injection 70.6 mg  1 mg/kg Intravenous Once Atul Sheridan MD   70.6 mg at 24 1445    [COMPLETED] HYDROcodone-acetaminophen (Norco) 5-325 MG per tab 1 tablet  1 tablet Oral Once Vitor Kline MD   1 tablet at 24 1245    [COMPLETED] morphINE PF 4 MG/ML injection 4 mg  4 mg Intravenous Once Vitor Kline MD   4 mg at 24 1448    [COMPLETED] sodium chloride 0.9 % IV bolus 1,000 mL  1,000 mL Intravenous Once Vitor Kline MD 1,000 mL/hr at 24 1447 1,000 mL at 24 1447    [COMPLETED]  ondansetron (Zofran) 4 MG/2ML injection 4 mg  4 mg Intravenous Once Vitor Kline MD   4 mg at 24 1448    [COMPLETED] ceFAZolin (Ancef) 2g in 10mL IV syringe premix  2 g Intravenous 30 Min Pre-Op Humberto Murphy MD   2 g at 10/04/24 0915    [COMPLETED] heparin (Porcine) 5000 UNIT/ML injection 5,000 Units  5,000 Units Subcutaneous Once Humberto Murphy MD   5,000 Units at 24 1758    [COMPLETED] dilTIAZem (cardIZEM) 25 mg/5mL injection 20 mg  20 mg Intravenous Once Vitor Kline MD   20 mg at 24 1619     Current Outpatient Medications on File Prior to Encounter   Medication Sig Dispense Refill    acetaminophen 500 MG Oral Tab 1 tablet (500 mg total) by Per G Tube route in the morning and 1 tablet (500 mg total) before bedtime.      Gabapentin 300 MG/6ML Oral Solution 300 mg by Peg Tube route in the morning and 300 mg before bedtime. 450 mL 3    linaGLIPtin (TRADJENTA) 5 mg Oral Tab 1 tablet (5 mg total) by Per G Tube route daily. 90 tablet 3    atorvastatin 40 MG Oral Tab 1 tablet (40 mg total) by Per G Tube route nightly. 90 tablet 3    amiodarone 200 MG Oral Tab 1 tablet (200 mg total) by Per G Tube route daily. 90 tablet 3    carvedilol 25 MG Oral Tab 1 tablet (25 mg total) by Per G Tube route 2 (two) times daily. 180 tablet 3    B Complex-C-Folic Acid (RENAL MULTIVITAMIN FORMULA) Oral Tab 1 tablet by Per G Tube route daily. 90 tablet 3    clopidogrel 75 MG Oral Tab Take 1 tablet (75 mg total) by mouth daily. 90 tablet 3    albuterol (PROAIR HFA) 108 (90 Base) MCG/ACT Inhalation Aero Soln Inhale 2 puffs into the lungs every 4 (four) hours as needed for Wheezing. 3 each 3    fluticasone propionate 50 MCG/ACT Nasal Suspension 2 sprays by Nasal route daily. 48 g 3    [] amoxicillin clavulanate 250-125 MG Oral Tab 1 tablet (250 mg total) by Per G Tube route daily. Taking for swelling to left side of jaw      lansoprazole 30 MG Oral Tablet Delayed Release Dispersible 1 tablet (30 mg  total) by Per G Tube route every morning before breakfast. 30 tablet 0    aspirin 81 MG Oral Tab EC Take 1 tablet (81 mg total) by mouth daily. (Patient taking differently: Take 1 tablet (81 mg total) by mouth daily. Per G-tube) 90 tablet 3    cetirizine 10 MG Oral Tab Take 1 tablet (10 mg total) by mouth every other day.      polyethylene glycol, PEG 3350, 17 g Oral Powd Pack 17 g by Per G Tube route daily as needed (Constipation).      Insulin Lispro, 1 Unit Dial, (HUMALOG KWIKPEN) 100 UNIT/ML Subcutaneous Solution Pen-injector Take subcutaneously 4 times daily before tube feedings per sliding scale. Max total dose of 20 units. (Patient not taking: Reported on 2024) 6 mL 1    [] glucagon (GVOKE HYPOPEN 2-PACK) 1 MG/0.2ML Subcutaneous injection Inject 0.2 mL (1 mg total) into the skin once as needed for Low blood glucose. 1 each 0    Insulin Pen Needle 33G X 4 MM Does not apply Misc 1 each 4 (four) times daily. 200 each 1    albuterol (2.5 MG/3ML) 0.083% Inhalation Nebu Soln Take 3 mL (2.5 mg total) by nebulization every 4 (four) hours as needed for Wheezing or Shortness of Breath. (Patient not taking: Reported on 2024) 360 mL 3    HYDROcodone-acetaminophen 5-325 MG Oral Tab 1 tablet by Per G Tube route every 8 (eight) hours as needed. (Patient not taking: Reported on 2024) 90 tablet 0    Incontinence Supply Disposable (COMFORT PROTECT ADULT DIAPER/L) Does not apply Misc 1 Application daily. 30 each 3    Electronic Thermometer ([employer] DIGITAL THERMOMETER TEMPLE) Does not apply Misc Check temperature 1 each 0    insulin glargine (LANTUS SOLOSTAR) 100 UNIT/ML Subcutaneous Solution Pen-injector Inject 8 Units into the skin every morning. (Patient not taking: Reported on 2024) 15 mL 0    Insulin Pen Needle 32G X 4 MM Does not apply Misc Take as directed 200 each 3    Budesonide-Formoterol Fumarate (SYMBICORT) 160-4.5 MCG/ACT Inhalation Aerosol Inhale 2 puffs into the lungs 2 (two) times daily.  (Patient not taking: Reported on 12/6/2024) 3 each 3    Starch-Maltodextrin (THICK-IT) Oral Powd Pack Use as directed (Patient not taking: Reported on 12/6/2024) 200 each 3    albuterol (2.5 MG/3ML) 0.083% Inhalation Nebu Soln Take 3 mL (2.5 mg total) by nebulization in the morning and 3 mL (2.5 mg total) before bedtime. (Patient not taking: Reported on 12/3/2024)      lidocaine 4 % External Patch Place 1 patch onto the skin daily. (Patient not taking: Reported on 12/3/2024)      acetaminophen 500 MG Oral Tab Take 1 tablet (500 mg total) by mouth every 6 (six) hours as needed for Pain. (Patient not taking: Reported on 12/6/2024)      Cholecalciferol 125 MCG (5000 UT) Oral Tab Take 1 tablet (5,000 Units total) by mouth 2 (two) times daily. (Patient not taking: Reported on 12/3/2024)

## 2024-12-16 NOTE — PLAN OF CARE
Fluid and electrolyte imbalances educated by HD RN & reinforced by this RN.     Problem: Patient Centered Care  Goal: Patient preferences are identified and integrated in the patient's plan of care  Description: Interventions:  - What would you like us to know as we care for you? I am from home with my wife  - Provide timely, complete, and accurate information to patient/family  - Incorporate patient and family knowledge, values, beliefs, and cultural backgrounds into the planning and delivery of care  - Encourage patient/family to participate in care and decision-making at the level they choose  - Honor patient and family perspectives and choices  Outcome: Progressing     Problem: Diabetes/Glucose Control  Goal: Glucose maintained within prescribed range  Description: INTERVENTIONS:  - Monitor Blood Glucose as ordered  - Assess for signs and symptoms of hyperglycemia and hypoglycemia  - Administer ordered medications to maintain glucose within target range  - Assess barriers to adequate nutritional intake and initiate nutrition consult as needed  - Instruct patient on self management of diabetes  Outcome: Progressing     Problem: Patient/Family Goals  Goal: Patient/Family Long Term Goal  Description: Patient's Long Term Goal: Discharge    Interventions:  - Monitor vitals  - Monitor appropriate labs  - Administer medications as ordered  - Follow MD's orders  - Update patient on plan of care   - Discharge planning     - See additional Care Plan goals for specific interventions  Outcome: Progressing  Goal: Patient/Family Short Term Goal  Description: Patient's Short Term Goal:     Interventions:   -   - See additional Care Plan goals for specific interventions  Outcome: Progressing     Problem: MUSCULOSKELETAL - ADULT  Goal: Return mobility to safest level of function  Description: INTERVENTIONS:  - Assess patient stability and activity tolerance for standing, transferring and ambulating w/ or w/o assistive devices  -  Assist with transfers and ambulation using safe patient handling equipment as needed  - Ensure adequate protection for wounds/incisions during mobilization  - Obtain PT/OT consults as needed  - Advance activity as appropriate  - Communicate ordered activity level and limitations with patient/family  Outcome: Progressing     Problem: PAIN - ADULT  Goal: Verbalizes/displays adequate comfort level or patient's stated pain goal  Description: INTERVENTIONS:  - Encourage pt to monitor pain and request assistance  - Assess pain using appropriate pain scale  - Administer analgesics based on type and severity of pain and evaluate response  - Implement non-pharmacological measures as appropriate and evaluate response  - Consider cultural and social influences on pain and pain management  - Manage/alleviate anxiety  - Utilize distraction and/or relaxation techniques  - Monitor for opioid side effects  - Notify MD/LIP if interventions unsuccessful or patient reports new pain  - Anticipate increased pain with activity and pre-medicate as appropriate  Outcome: Progressing     Problem: RISK FOR INFECTION - ADULT  Goal: Absence of fever/infection during anticipated neutropenic period  Description: INTERVENTIONS  - Monitor WBC  - Administer growth factors as ordered  - Implement neutropenic guidelines  Outcome: Progressing     Problem: SAFETY ADULT - FALL  Goal: Free from fall injury  Description: INTERVENTIONS:  - Assess pt frequently for physical needs  - Identify cognitive and physical deficits and behaviors that affect risk of falls.  - [place] fall precautions as indicated by assessment.  - Educate pt/family on patient safety including physical limitations  - Instruct pt to call for assistance with activity based on assessment  - Modify environment to reduce risk of injury  - Provide assistive devices as appropriate  - Consider OT/PT consult to assist with strengthening/mobility  - Encourage toileting schedule  Outcome:  Progressing     Problem: DISCHARGE PLANNING  Goal: Discharge to home or other facility with appropriate resources  Description: INTERVENTIONS:  - Identify barriers to discharge w/pt and caregiver  - Include patient/family/discharge partner in discharge planning  - Arrange for needed discharge resources and transportation as appropriate  - Identify discharge learning needs (meds, wound care, etc)  - Arrange for interpreters to assist at discharge as needed  - Consider post-discharge preferences of patient/family/discharge partner  - Complete POLST form as appropriate  - Assess patient's ability to be responsible for managing their own health  - Refer to Case Management Department for coordinating discharge planning if the patient needs post-hospital services based on physician/LIP order or complex needs related to functional status, cognitive ability or social support system  Outcome: Progressing     Problem: METABOLIC/FLUID AND ELECTROLYTES - ADULT  Goal: Glucose maintained within prescribed range  Description: INTERVENTIONS:  - Monitor Blood Glucose as ordered  - Assess for signs and symptoms of hyperglycemia and hypoglycemia  - Administer ordered medications to maintain glucose within target range  - Assess barriers to adequate nutritional intake and initiate nutrition consult as needed  - Instruct patient on self management of diabetes  Outcome: Progressing  Goal: Electrolytes maintained within normal limits  Description: INTERVENTIONS:  - Monitor labs and rhythm and assess patient for signs and symptoms of electrolyte imbalances  - Administer electrolyte replacement as ordered  - Monitor response to electrolyte replacements, including rhythm and repeat lab results as appropriate  - Fluid restriction as ordered  - Instruct patient on fluid and nutrition restrictions as appropriate  Outcome: Progressing  Goal: Hemodynamic stability and optimal renal function maintained  Description: INTERVENTIONS:  - Monitor labs  and assess for signs and symptoms of volume excess or deficit  - Monitor intake, output and patient weight  - Monitor urine specific gravity, serum osmolarity and serum sodium as indicated or ordered  - Monitor response to interventions for patient's volume status, including labs, urine output, blood pressure (other measures as available)  - Encourage oral intake as appropriate  - Instruct patient on fluid and nutrition restrictions as appropriate  Outcome: Progressing

## 2024-12-16 NOTE — PHYSICAL THERAPY NOTE
PHYSICAL THERAPY TREATMENT NOTE - INPATIENT     Room Number: 553/553-A       Presenting Problem: peripheral neuropathy  Co-Morbidities : BLE leg pain    Problem List  Principal Problem:    Peripheral polyneuropathy  Active Problems:    ESRD on hemodialysis (HCC)    Hypophosphatemia      PHYSICAL THERAPY ASSESSMENT   Patient demonstrates limited progress this session, goals  remain in progress.      Patient is requiring maximum assist as a result of the following impairments: decreased functional strength, decreased endurance/aerobic capacity, pain, impaired static and dynamic balance, and decreased muscular endurance.     Patient continues to function below baseline with bed mobility, transfers, gait, and standing prolonged periods.  Next session anticipate patient to progress bed mobility, transfers, gait, and maintaining seated position.  Physical Therapy will continue to follow patient for duration of hospitalization.    Patient continues to benefit from continued skilled PT services: to promote return to prior level of function and safety with continuous assistance and gradual rehabilitative therapy .    PLAN DURING HOSPITALIZATION  Nursing Mobility Recommendation : Lift Equipment  PT Device Recommendation: Rolling walker  PT Treatment Plan: Bed mobility;Body mechanics;Coordination;Endurance;Energy conservation;Patient education;Gait training;Strengthening;Transfer training;Balance training  Frequency (Obs): 3-5x/week     SUBJECTIVE  Pt was agreeable to therapy session.         OBJECTIVE  Precautions: Limb alert - right;Bed/chair alarm    WEIGHT BEARING RESTRICTION       PAIN ASSESSMENT   Rating: Unable to rate  Location: generalized  Management Techniques: Activity promotion;Body mechanics;Relaxation;Repositioning    BALANCE  Static Sitting: Fair -  Dynamic Sitting: Fair -  Static Standing: Poor -  Dynamic Standing: Poor -    ACTIVITY TOLERANCE                          O2 WALK       AM-PAC '6-Clicks' INPATIENT  SHORT FORM - BASIC MOBILITY  How much difficulty does the patient currently have...  Patient Difficulty: Turning over in bed (including adjusting bedclothes, sheets and blankets)?: A Lot   Patient Difficulty: Sitting down on and standing up from a chair with arms (e.g., wheelchair, bedside commode, etc.): A Lot   Patient Difficulty: Moving from lying on back to sitting on the side of the bed?: A Lot   How much help from another person does the patient currently need...   Help from Another: Moving to and from a bed to a chair (including a wheelchair)?: A Lot   Help from Another: Need to walk in hospital room?: Total   Help from Another: Climbing 3-5 steps with a railing?: Total     AM-PAC Score:  Raw Score: 10   Approx Degree of Impairment: 76.75%   Standardized Score (AM-PAC Scale): 32.29   CMS Modifier (G-Code): CL    FUNCTIONAL ABILITY STATUS  Functional Mobility/Gait Assessment  Gait Assistance: Maximum assistance  Distance (ft): SPT  Assistive Device: Other (Comment) (holding therapist)  Pattern: Shuffle  Rolling: maximum assist  Supine to Sit: maximum assist  Sit to Supine:  NT  Sit to Stand:  NT    Skilled Therapy Provided: Pt is received in the bed and was cleared for therapy session. Co treat session with OT to maximize pt outcome during the session. Pt returned from dialysis and was feeling weak but agreeable to to transfer to the chair. Pt is max A with all OOB mobilities. Pt is max A with bed mobility. Pt sat EOB for a few minutes and denied any dizziness and light headedness. Pt performed sit<>stand transfers while holding onto the therapist max A and performed SPT from the bed to the chair also max A. Pt is very weak and fatigued. Pt is repositioned and left in the chair with all needs within reach and alarm system activated..     The patient's Approx Degree of Impairment: 76.75% has been calculated based on documentation in the [facility] '6 clicks' Inpatient Daily Activity Short Form.  Research supports  that patients with this level of impairment may benefit from Rehab.  Final disposition will be made by interdisciplinary medical team.      Patient End of Session: Up in chair;Needs met;Call light within reach;RN aware of session/findings;All patient questions and concerns addressed;Hospital anti-slip socks;Alarm set    CURRENT GOALS   Goals to be met by: 12/20/24  Patient Goal Patient's self-stated goal is: to go home   Goal #1 Patient is able to demonstrate supine - sit EOB @ level: minimum assistance      Goal #1   Current Status Max A    Goal #2 Patient is able to demonstrate transfers Sit to/from Stand at assistance level: minimum assistance with walker - rolling      Goal #2  Current Status Max A while holding therapist   Goal #3 Patient is able to ambulate 10 feet with assist device: walker - rolling at assistance level: minimum assistance   Goal #3   Current Status SPT max A while holding therapist    Goal #4 Patient to demonstrate independence with home activity/exercise instructions provided to patient in preparation for discharge.   Goal #4   Current Status In progress       Therapeutic Activity: 15 minutes

## 2024-12-16 NOTE — PROGRESS NOTES
[facility] [phone] spoke to Zulay patient is scheduled for dialysis 12/16/2024

## 2024-12-16 NOTE — CHRONIC PAIN
[facility]  Inpatient Pain Management Progress Note      Patient name: Ollie Hernández 79 year old male  : 1945  MRN: [id number]    Diagnosis:   1. Peripheral polyneuropathy    2. ESRD on hemodialysis (HCC)        Reason for Consult: LE pain    Current hospital day: Hospital Day: 11    Pain Scores: Pain better controlled this AM.     Subjective:   Patient in HD this AM. Per wife patient is still having pain in his legs. MRI showing spinal stenosis worse at the L4-5 level.   Patient is pain is moderately controlled.  He states the medications that we are giving him make him sleepy but do allow him to feel better.  He still reports occasional shocks in his bilateral feet.      Pain Meds   Acetaminophen [id number]  Gabapentin (stopped)  Nortriptyline (10mg nightly)  Lidocaine patch  Norco (stopped due to AMS)      History:  Past Medical History:    Anemia    Asthma (HCC)    Back problem    BPH (benign prostatic hyperplasia)    Calculus of kidney    Cataract    Coronary atherosclerosis    Diabetes (HCC)    ESRD (end stage renal disease) on dialysis ([facility])    Essential hypertension    High blood pressure    High cholesterol    History of blood transfusion    Hyperlipidemia    Neuropathy    hands and feet    KRAIG on CPAP    Renal disorder    Sleep apnea    Visual impairment    glasses    Vocal cord paralysis, unilateral partial     Past Surgical History:   Procedure Laterality Date    Appendectomy          Back surgery      Neck/back -     Capsule endoscopy - internal referral      Cataract extraction Right 2022    PHACOEMULSIFICATION WITH POSTERIOR CHAMBER INTRAOCULAR LENS, RIGHT EYE    Cataract extraction Left 2021    PHACOEMULSIFICATION WITH POSTERIOR CHAMBER INTRAOCULAR LENS, LEFT EYE    Cath bare metal stent (bms)      Cath drug eluting stent  2024    Successful IVUS guided PCI of the circumflex with a ABIMBOLA    Colonoscopy      Colonoscopy N/A 2021    Procedure:  COLONOSCOPY;  Surgeon: Michael Gautam MD;  Location: [facility] ENDO    Colonoscopy N/A 06/03/2024    Procedure: COLONOSCOPY;  Surgeon: Gabriel Saldana MD;  Location: [facility] ENDOSCOPY    Colonoscopy N/A 06/15/2024    Procedure: COLONOSCOPY;  Surgeon: Carlyn Storey MD;  Location: [facility] ENDOSCOPY    Colonoscopy N/A 07/31/2024    Procedure: COLONOSCOPY;  Surgeon: Gabriel Saldana MD;  Location: [facility] ENDOSCOPY    Dialysis procedure  02/17/2023    right internal jugular tunneled dialysis catheter placement.    Hand/finger surgery unlisted      Accidental trauma    Spine surgery procedure unlisted      Total hip arthroplasty Left 10/04/2024    Left anterior total hip arthroplasty    Upper gi endoscopy,diagnosis       Family History   Problem Relation Age of Onset    Cancer Father         Kidney    Breast Cancer Mother     Diabetes Mother     Diabetes Maternal Grandmother     Diabetes Maternal Grandfather     Heart Disorder Other         Uncle      reports that he quit smoking about 43 years ago. His smoking use included cigarettes. He started smoking about 61 years ago. He has a 17 pack-year smoking history. He has never used smokeless tobacco. He reports that he does not drink alcohol and does not use drugs.    Allergies:  Allergies[1]    Current Medications:  Scheduled Meds:   metoprolol tartrate  25 mg Oral 2x Daily(Beta Blocker)    acetaminophen  500 mg Oral q6h    ampicillin-sulbactam  1.5 g Intravenous q12h    insulin regular human  1-7 Units Subcutaneous 4 times per day    [Held by provider] gabapentin  300 mg Per G Tube BID    aspirin  81 mg Per G Tube Daily    lidocaine-menthol  2 patch Transdermal Daily    epoetin donna  10,000 Units Subcutaneous Once per day on Monday Wednesday Friday    nortriptyline  10 mg Oral Nightly    amiodarone  200 mg Per G Tube Daily    atorvastatin  40 mg Per G Tube Nightly    [Held by provider] clopidogrel  75 mg Oral Daily    fluticasone propionate  2 spray Nasal Daily    insulin degludec  5  Units Subcutaneous Nightly    lansoprazole  30 mg Per G Tube QAM AC    heparin  5,000 Units Subcutaneous Q8H STEPHANIE     Continuous Infusions:   dextrose 10%       PRN Meds:.  [START ON 12/17/2024] sodium chloride **AND** [START ON 12/17/2024] albumin human    [Held by provider] LORazepam    heparin    lidocaine-prilocaine    dextrose 10%    lipase-protease-amylase (Lip-Prot-Amyl) **AND** sodium bicarbonate    metoprolol **OR** metoprolol    [Held by provider] HYDROmorphone **OR** [Held by provider] HYDROmorphone **OR** [Held by provider] HYDROmorphone    glucose **OR** glucose **OR** glucose-vitamin C **OR** dextrose **OR** glucose **OR** glucose **OR** glucose-vitamin C    acetaminophen **OR** [DISCONTINUED] HYDROcodone-acetaminophen **OR** [DISCONTINUED] HYDROcodone-acetaminophen    polyethylene glycol (PEG 3350)    sennosides    bisacodyl    Exam:Blood pressure 111/55, pulse 82, temperature 97.9 °F (36.6 °C), temperature source Oral, resp. rate 16, height 5' 4\" (1.626 m), weight 141 lb 15.6 oz (64.4 kg), SpO2 98%.    Imaging:   Lumbar MRI  CONCLUSION:      1. Multilevel degenerative changes of the lumbar spine, which are described in detail above and are similar when compared to 02/07/2023.   2. At L4-L5, there is severe canal stenosis, severe bilateral subarticular zone stenosis, and moderate to severe bilateral foraminal narrowing.   3. Redemonstrated postoperative changes from prior decompressive laminectomies at L2-L4.   4. Grade 1 anterolisthesis of L4 on L5.   5. No acute fracture or traumatic listhesis of the lumbar spine.   6. Multifocal abnormal increased T2/STIR signal involving the paraspinal musculature, which may reflect atrophy, denervation, or less likely reflect myositis.   7. Circumferential bladder wall thickening, which may be secondary to underdistention, cystitis, or chronic outlet obstruction.   8. Lesser incidental findings described above.          Assessment:  79 year old male with past  medical history of T2DM, HTN, HLD, ESRD on HD MWF via R AVF, CAD s/p PCI (5/2024), A.fib on AC, HFpEF, KRAIG, BPH, history of recurrent gastrointestinal bleeding, who presented with bilateral lower extremity pain.    Pain management was consulted for bilateral lower extremities pain in the setting of peripheral neuropathy. MRI was also done showing severe canal stenosis at L4-5. Patient pain is likely multifactorial from both diabetic neuropathy and spinal stenosis. History is more indicative of peripheral neuropathy pain.     Currently patient states his pain is moderately controlled on his current regimen.  He states the [place] helps his pain however it does make him tired.  He is on low-dose gabapentin given his renal failure.  He is also on nortriptyline 10 mg nightly.    Pain Meds   Acetaminophen 209iod0  Gabapentin (stopped)  Nortriptyline (10mg nightly)  Lidocaine patch  Norco (stopped due to AMS)    Plan:  - Plavix being held for LESI on 12/17 for spinal stenosis.   - Severe confusion resolved .   -Continue lidocaine patch and nortiptyline   - Pain controlled today on evaluation. Will add tramadol 50mg PRN as needed, with goal of being less sedating that Norco  -Tylenol as needed  - Pain service will sign off at this time.     Total Time: 20 minutes     Lengthy discussion of risks, benefits, and alternative treatments were reviewed to patients satisfaction. All questions were answered. Patient agreeable to plan. Greater than 50% of the time was spent with counseling (nature of discussion centered around pain, therapy, and treatment options), face to face time, time spent reviewing data, obtaining patient information and discussing the care with the patients health care providers.     Chronic Pain Service [phone]         [1]   Allergies  Allergen Reactions    Adhesive Tape OTHER (SEE COMMENTS)     Severe rashes    Dust Mite Extract RASH

## 2024-12-16 NOTE — PLAN OF CARE
Problem: ALTERED NUTRIENT INTAKE - ADULT  Goal: Nutrient intake appropriate for improving, restoring or maintaining nutritional needs  Description: INTERVENTIONS:  - Assess nutritional status and recommend course of action  - Monitor labs and treatment plans  - Recommend appropriate vitamin/mineral supplements  - Recommend, monitor, and adjust tube feedings and TPN/PPN based on assessed needs  - Provide specific nutrition education as appropriate  Outcome: Progressing

## 2024-12-16 NOTE — PROGRESS NOTES
[facility]  part of [facility]    Progress Note    Ollie Hernández Patient Status:  Inpatient    1945 MRN [id number]   Location [facility] 5SW/SE Attending Dee Joyce,*   Hosp Day # 10 PCP Wili Parmar MD     Chief Complaint: foot pain    Subjective:       Family at bedside, patient flipped into RVR during dialysis transiently.  Asymptomatic.    Otherwise pain seems to be well-controlled awaiting WILLIAM tomorrow      Objective:   Blood pressure 114/57, pulse 72, temperature 98.3 °F (36.8 °C), temperature source Oral, resp. rate 18, height 5' 4\" (1.626 m), weight 141 lb 15.6 oz (64.4 kg), SpO2 98%.  Physical Exam  Vitals and nursing note reviewed.   Constitutional:       General: He is not in acute distress.     Appearance: He is ill-appearing. He is not toxic-appearing or diaphoretic.   Cardiovascular:      Rate and Rhythm: Normal rate.      Pulses: Normal pulses.   Pulmonary:      Breath sounds: Rhonchi present. No wheezing.   Abdominal:      General: Bowel sounds are normal.      Palpations: Abdomen is soft.   Skin:     General: Skin is warm and dry.      Capillary Refill: Capillary refill takes less than 2 seconds.   Neurological:      General: No focal deficit present.      Mental Status: He is alert.   Psychiatric:         Behavior: Behavior normal.         Results:   Lab Results   Component Value Date    WBC 11.5 (H) 2024    HGB 8.0 (L) 2024    HCT 25.4 (L) 2024    .0 2024    CREATSERUM 2.92 (H) 2024    BUN 40 (H) 2024     2024    K 5.0 2024     2024    CO2 32.0 2024     (H) 2024    CA 9.6 2024    ALB 3.0 (L) 2024    ALKPHO 116 2024    BILT 0.3 2024    TP 5.7 2024    AST 32 2024    ALT 29 2024    PTT 30.1 2024    INR 1.45 (H) 2024    T4F 0.9 2022    TSH 2.844 2024     (H) 2024    PSA 2.94  10/20/2021    DDIMER 6.07 (H) 09/29/2024    ESRML 10 06/16/2024    CRP 1.30 (H) 06/16/2024    MG 2.2 12/11/2024    PHOS 4.0 12/11/2024    TROP <0.045 07/25/2019    TROPHS 52 12/03/2024     08/05/2023    B12 672 07/04/2024       CT BRAIN OR HEAD (CPT=70450)    Result Date: 12/14/2024  CONCLUSION: No acute intracranial abnormality.  Mild involutional and moderate chronic microvascular ischemic changes.    Dictated by (CST): Florentino Mari MD on 12/14/2024 at 5:10 PM     Finalized by (CST): Florentino Mari MD on 12/14/2024 at 5:13 PM          XR CHEST AP PORTABLE  (CPT=71045)    Result Date: 12/14/2024  CONCLUSION:   Enlarging right pleural effusion and increasing right lower lung opacity since the prior exam of 12/06/2024.  Superimposed infection is suspected.  Background pulmonary edema is again seen.  Unchanged cardiomegaly, aortic atherosclerosis, thoracic spondylosis, and bilateral shoulder degenerative joint disease.     Dictated by (CST): Florentino Mari MD on 12/14/2024 at 1:39 PM     Finalized by (CST): Florentino Mari MD on 12/14/2024 at 1:42 PM               Assessment & Plan:         Peripheral polyneuropathy; checked rpr ok; thyroid; electropheresis; b12 ok 7/24  -Patient p/w bilateral lower extremity pain  -Significantly worse over the past 3 to 4 days.  -No real improvement with gabapentin at home  -Difficulties with ambulation because of pain  -Pain control as able; pt screaming in pain or asleep; pain clinic saw; await mri  poss steroid   -PT/OT  -Continue to monitor  -MRI results reviewed   -12/14: Discussed with pain service in view of increased confusion and hypoxia will discontinue all narcotics at this time, if pain ensues consider PCA for better autoregulation.  Hopefully patient can still be plan for WILLIAM on 12/17/2024 which is when he would be 7 days away from Plavix.  Appreciate pain service    Acute respiratory failure, confusion/hypoxia: Significant event 12/14/2020/probable aspiration  pneumonia  Possibly secondary to narcotics although concern for aspiration versus pneumonia  Chest x-ray consistent with some pulmonary edema and questionable underlying pneumonia  CT head no acute findings  ABG reviewed no CO2 narcosis  IV Unasyn initiated  O2 support as needed, wean as able  Continue to monitor     ESRD on HD  -Normally receives dialysis on Monday Wednesday Friday.  Last session was on Wednesday.  -Nephrology consulted, further HD per nephrology.     Anemia of chronic renal disease and iron defy   -checked stool for blood, neg;  -Likely secondary to ESRD as above  -Hemoglobin noted to be 7.1  -Continue to monitor     Afib :   has bel romano put in in 9/2024; mri ok with watchman per dr coffey  12/13: Patient had episode of asymptomatic transient RVR self-limited.  12/16: Again RVR during dialysis, reach out to cardiology for any further medication adjustments.  Awaiting further recommendations.  Type 2 DM:  - Monitoring Blood glucose with POC checks  - SSI to cover hyperglycemia  - Hypoglycemia protocol  - Will monitor and adjust agents as needed.  -Patient has appoint with Dr. Sevilla on 12/17/2024.  If patient does get epidural steroid injection that day I will reach out to her to see if she needs to make any adjustments on patient's diabetic management.     -KRAIG  -Hypertension  -Chronic dCHF  -Pulm HTN             Cad with left circ stent 5/24 poss need steroid inj; will hold and notify cards dr gale    Patient will require inpatient services that will reasonably be expected to span two midnight's based on the clinical documentation in H+P.   Based on patients current state of illness, I anticipate that, after discharge, patient will require TBD.  Complex mdm; coordinating care with nurse/mis cards and counseling pt and with his permission his wife on phone about labs/neuropathy/mri/plavix/stent

## 2024-12-16 NOTE — PLAN OF CARE
Problem: Patient Centered Care  Goal: Patient preferences are identified and integrated in the patient's plan of care  Description: Interventions:  - What would you like us to know as we care for you? I am from home with my wife  - Provide timely, complete, and accurate information to patient/family  - Incorporate patient and family knowledge, values, beliefs, and cultural backgrounds into the planning and delivery of care  - Encourage patient/family to participate in care and decision-making at the level they choose  - Honor patient and family perspectives and choices  Outcome: Progressing     Problem: Diabetes/Glucose Control  Goal: Glucose maintained within prescribed range  Description: INTERVENTIONS:  - Monitor Blood Glucose as ordered  - Assess for signs and symptoms of hyperglycemia and hypoglycemia  - Administer ordered medications to maintain glucose within target range  - Assess barriers to adequate nutritional intake and initiate nutrition consult as needed  - Instruct patient on self management of diabetes  Outcome: Progressing     Problem: Patient/Family Goals  Goal: Patient/Family Long Term Goal  Description: Patient's Long Term Goal: Discharge    Interventions:  - Monitor vitals  - Monitor appropriate labs  - Administer medications as ordered  - Follow MD's orders  - Update patient on plan of care   - Discharge planning     - See additional Care Plan goals for specific interventions  Outcome: Progressing  Goal: Patient/Family Short Term Goal  Description: Patient's Short Term Goal:     Interventions:   -   - See additional Care Plan goals for specific interventions  Outcome: Progressing     Problem: MUSCULOSKELETAL - ADULT  Goal: Return mobility to safest level of function  Description: INTERVENTIONS:  - Assess patient stability and activity tolerance for standing, transferring and ambulating w/ or w/o assistive devices  - Assist with transfers and ambulation using safe patient handling equipment as  needed  - Ensure adequate protection for wounds/incisions during mobilization  - Obtain PT/OT consults as needed  - Advance activity as appropriate  - Communicate ordered activity level and limitations with patient/family  Outcome: Progressing     Problem: PAIN - ADULT  Goal: Verbalizes/displays adequate comfort level or patient's stated pain goal  Description: INTERVENTIONS:  - Encourage pt to monitor pain and request assistance  - Assess pain using appropriate pain scale  - Administer analgesics based on type and severity of pain and evaluate response  - Implement non-pharmacological measures as appropriate and evaluate response  - Consider cultural and social influences on pain and pain management  - Manage/alleviate anxiety  - Utilize distraction and/or relaxation techniques  - Monitor for opioid side effects  - Notify MD/LIP if interventions unsuccessful or patient reports new pain  - Anticipate increased pain with activity and pre-medicate as appropriate  Outcome: Progressing     Problem: RISK FOR INFECTION - ADULT  Goal: Absence of fever/infection during anticipated neutropenic period  Description: INTERVENTIONS  - Monitor WBC  - Administer growth factors as ordered  - Implement neutropenic guidelines  Outcome: Progressing     Problem: SAFETY ADULT - FALL  Goal: Free from fall injury  Description: INTERVENTIONS:  - Assess pt frequently for physical needs  - Identify cognitive and physical deficits and behaviors that affect risk of falls.  - [place] fall precautions as indicated by assessment.  - Educate pt/family on patient safety including physical limitations  - Instruct pt to call for assistance with activity based on assessment  - Modify environment to reduce risk of injury  - Provide assistive devices as appropriate  - Consider OT/PT consult to assist with strengthening/mobility  - Encourage toileting schedule  Outcome: Progressing     Problem: DISCHARGE PLANNING  Goal: Discharge to home or other  facility with appropriate resources  Description: INTERVENTIONS:  - Identify barriers to discharge w/pt and caregiver  - Include patient/family/discharge partner in discharge planning  - Arrange for needed discharge resources and transportation as appropriate  - Identify discharge learning needs (meds, wound care, etc)  - Arrange for interpreters to assist at discharge as needed  - Consider post-discharge preferences of patient/family/discharge partner  - Complete POLST form as appropriate  - Assess patient's ability to be responsible for managing their own health  - Refer to Case Management Department for coordinating discharge planning if the patient needs post-hospital services based on physician/LIP order or complex needs related to functional status, cognitive ability or social support system  Outcome: Progressing     Problem: METABOLIC/FLUID AND ELECTROLYTES - ADULT  Goal: Glucose maintained within prescribed range  Description: INTERVENTIONS:  - Monitor Blood Glucose as ordered  - Assess for signs and symptoms of hyperglycemia and hypoglycemia  - Administer ordered medications to maintain glucose within target range  - Assess barriers to adequate nutritional intake and initiate nutrition consult as needed  - Instruct patient on self management of diabetes  Outcome: Progressing  Goal: Electrolytes maintained within normal limits  Description: INTERVENTIONS:  - Monitor labs and rhythm and assess patient for signs and symptoms of electrolyte imbalances  - Administer electrolyte replacement as ordered  - Monitor response to electrolyte replacements, including rhythm and repeat lab results as appropriate  - Fluid restriction as ordered  - Instruct patient on fluid and nutrition restrictions as appropriate  Outcome: Progressing  Goal: Hemodynamic stability and optimal renal function maintained  Description: INTERVENTIONS:  - Monitor labs and assess for signs and symptoms of volume excess or deficit  - Monitor  intake, output and patient weight  - Monitor urine specific gravity, serum osmolarity and serum sodium as indicated or ordered  - Monitor response to interventions for patient's volume status, including labs, urine output, blood pressure (other measures as available)  - Encourage oral intake as appropriate  - Instruct patient on fluid and nutrition restrictions as appropriate  Outcome: Progressing

## 2024-12-16 NOTE — OCCUPATIONAL THERAPY NOTE
OCCUPATIONAL THERAPY TREATMENT NOTE - INPATIENT        Room Number: 553/553-A     Presenting Problem: peripheral polyneuropathy    Problem List  Principal Problem:    Peripheral polyneuropathy  Active Problems:    ESRD on hemodialysis (HCC)    Hypophosphatemia      OCCUPATIONAL THERAPY ASSESSMENT   Patient demonstrates limited progress this session, goals remain in progress.    Patient is requiring  up to max assist for  as a result of the following impairments: decreased functional strength, decreased functional reach, decreased endurance, impaired standing balance, impaired coordination, decreased muscular endurance, and medical status.    Patient continues to function below baseline with toileting, lower body dressing, bed mobility, transfers, static standing balance, dynamic standing balance, and functional standing tolerance.  Next session anticipate patient to progress lower body dressing, bed mobility, transfers, and functional standing tolerance.  Occupational Therapy will continue to follow patient for duration of hospitalization.    Patient continues to benefit from continued skilled OT services: to promote return to prior level of function and safety with continuous assistance and gradual rehabilitative therapy.     PLAN DURING HOSPITALIZATION  OT Device Recommendations: TBD  OT Treatment Plan: Balance activities;Energy conservation/work simplification techniques;ADL training;Functional transfer training;Compensatory technique education;Patient/Family education;Patient/Family training     SUBJECTIVE  Dialysis didn't go well this morning.     OBJECTIVE  Precautions: Limb alert - right;Bed/chair alarm       PAIN ASSESSMENT  Rating: Unable to rate  Location: generalized body aches  Management Techniques: Activity promotion; Relaxation; Repositioning    ACTIVITY TOLERANCE  Pulse: 79  Heart Rate Source: Monitor                   O2 SATURATIONS  Oxygen Therapy  SPO2% on Oxygen at Rest: 100  At rest oxygen flow  (liters per minute): 3    ACTIVITIES OF DAILY LIVING ASSESSMENT  AM-PAC ‘6-Clicks’ Inpatient Daily Activity Short Form  How much help from another person does the patient currently need…  -   Putting on and taking off regular lower body clothing?: A Lot  -   Bathing (including washing, rinsing, drying)?: A Lot  -   Toileting, which includes using toilet, bedpan or urinal? : A Lot  -   Putting on and taking off regular upper body clothing?: A Little  -   Taking care of personal grooming such as brushing teeth?: A Little  -   Eating meals?: A Little    AM-PAC Score:  Score: 15  Approx Degree of Impairment: 56.46%  Standardized Score (AM-PAC Scale): 34.69  CMS Modifier (G-Code): CK    FUNCTIONAL TRANSFER ASSESSMENT  Sit to Stand: Edge of Bed  Edge of Bed: Maximum Assist  Simulated commode transfer: Max assist (stand-pivot transfer)   BED MOBILITY  Supine to Sit : Maximum Assist    BALANCE ASSESSMENT  Static Sitting: Stand-by Assist  Static Standing: Maximum Assist    FUNCTIONAL ADL ASSESSMENT  Grooming Seated: set-up assist        Skilled Therapy Provided:   RN cleared pt for participation. Session coordinated with PT. Pt received laying in bed with no visitors present. Pt agreeable to participation in therapy, requesting to get out of bed and to the chair. Pt benefited from increased time and physical support when completing mobility/transfers. Pt is max A for bed mobility (supine to sit) and tolerated sitting EOB for ~2 min in preparation for ADL tasks. Pt demonstrated improved static sitting balance, progressing to stand-by assist for safety. Pt required max assist to complete simulated stand-pivot commode transfer to bedside chair. Pt weak and fatigued at end of session. Pt remained up in chair with all needs in reach and RN aware of session.       EDUCATION PROVIDED  Patient Education : Role of Occupational Therapy; Plan of Care; Functional Transfer Techniques; Fall Prevention; Posture/Positioning; Proper Body  Mechanics  Patient's Response to Education: Verbalized Understanding; Returned Demonstration  Family/Caregiver Education : Role of Occupational Therapy; Plan of Care  Family/Caregiver's Response to Education: Verbalized Understanding    The patient's Approx Degree of Impairment: 56.46% has been calculated based on documentation in the [facility] '6 clicks' Inpatient Daily Activity Short Form.  Research supports that patients with this level of impairment may benefit from rehab.  Final disposition will be made by interdisciplinary medical team.    Patient End of Session: Up in chair;Needs met;Call light within reach;RN aware of session/findings;All patient questions and concerns addressed;Hospital anti-slip socks    OT Goals:  Patients self stated goal is: unstated     Patient will complete functional transfer with min A  Comment: ongoing- max assist     Patient will complete toileting with min A  Comment: ongoing- max     Patient will tolerate standing for 3 minutes in prep for adls with min A   Comment: ongoing     Patient will complete item retrieval with min A  Comment: not tested           Goals  on: 24  Frequency: 3-5x/week  OT Session Time: 15 minutes  Self-Care Home Management: 15 minutes

## 2024-12-16 NOTE — PROGRESS NOTES
[facility]  part of [facility]    Progress Note    Ollie Hernández Patient Status:  Inpatient    1945 MRN [id number]   Location [facility] 5SW/SE Attending Lizbeth Rey MD   Hosp Day # 10 PCP Wili Parmar MD       Subjective:   Ollie Hernández is a(n) 79 year old male who I am seeing for ESRD    This was discontinued after an hour and a half due to patient having chest pain and A-fib with RVR.      Objective:   /55 (BP Location: Left arm)   Pulse 82   Temp 97.9 °F (36.6 °C) (Oral)   Resp 16   Ht 5' 4\" (1.626 m)   Wt 141 lb 15.6 oz (64.4 kg)   SpO2 98%   BMI 24.37 kg/m²      Intake/Output Summary (Last 24 hours) at 2024 1207  Last data filed at 2024 1146  Gross per 24 hour   Intake 1374 ml   Output 600 ml   Net 774 ml     Wt Readings from Last 1 Encounters:   24 141 lb 15.6 oz (64.4 kg)       Exam  Gen: No acute distress, Heent: NC AT, mucous memb clear, neck supple  Pulm: Lungs clear, normal respiratory effort  CV: Heart with regular rate and rhythm, no edema  Abd: Abdomen soft, nontender, nondistended, no organomegaly, bowel sounds present  Skin: no symptoms reported  Psych: alert and oriented    Assessment and Plan:     1 - ESRD  Hold dialysis for the rest of the day.  Will plan dialysis tomorrow once his cardiac situation is stabilized.  However dialysis will need to be continued as it is a life preserving modality    2 -lower extremity pain  Physical therapy and pain medications    3 -anemia  Status post PRBC last week.  He is on Epogen    4 -A-fib with RVR/coronary artery disease  Has Watchman  The patient is on aspirin and Plavix as well as amiodarone.  Cardiology following    5 -hypertension with ESRD  Pressure has been running very low.  Metoprolol dose was dropped    6 -aspiration pneumonia/respiratory failure  On Unasyn    Results:     Recent Labs   Lab 24  0653 24  0825 24  0813   RBC 2.80* 2.59* 2.52*   HGB  8.7* 8.0* 7.6*   HCT 27.0* 25.4* 25.1*   MCV 96.4 98.1 99.6   MCH 31.1 30.9 30.2   MCHC 32.2 31.5 30.3*   RDW 17.5* 18.4* 17.8*   NEPRELIM 8.12* 8.67* 6.35   WBC 11.1* 11.5* 8.5   .0 244.0 221.0         Recent Labs   Lab 12/11/24  0653 12/14/24  0825 12/16/24  0813   * 168* 123*   BUN 35* 40* 69*   CREATSERUM 3.35* 2.92* 4.39*   CA 9.1 9.6 9.6   * 137 135*   K 4.7 5.0 5.1   CL 96* 100 97*   CO2 33.0* 32.0 33.0*          CT BRAIN OR HEAD (CPT=70450)    Result Date: 12/14/2024  CONCLUSION: No acute intracranial abnormality.  Mild involutional and moderate chronic microvascular ischemic changes.    Dictated by (CST): Florentino Mari MD on 12/14/2024 at 5:10 PM     Finalized by (CST): Florentino Mari MD on 12/14/2024 at 5:13 PM          XR CHEST AP PORTABLE  (CPT=71045)    Result Date: 12/14/2024  CONCLUSION:   Enlarging right pleural effusion and increasing right lower lung opacity since the prior exam of 12/06/2024.  Superimposed infection is suspected.  Background pulmonary edema is again seen.  Unchanged cardiomegaly, aortic atherosclerosis, thoracic spondylosis, and bilateral shoulder degenerative joint disease.     Dictated by (CST): Florentino Mari MD on 12/14/2024 at 1:39 PM     Finalized by (CST): Florentino Mari MD on 12/14/2024 at 1:42 PM               SONDRA MART MD  12/16/2024

## 2024-12-16 NOTE — DIETARY NOTE
ADULT NUTRITION REASSESSMENT    Pt is at high nutrition risk.  Pt does not meet malnutrition criteria.      RECOMMENDATIONS TO MD: See Nutrition Intervention for enteral nutrition specifics     ADMITTING DIAGNOSIS:  ESRD on hemodialysis (HCC) [N18.6, Z99.2]Peripheral polyneuropathy [G62.9]    PERTINENT PAST MEDICAL HISTORY:   Past Medical History:    Anemia    Asthma (HCC)    Back problem    BPH (benign prostatic hyperplasia)    Calculus of kidney    Cataract    Coronary atherosclerosis    Diabetes (HCC)    ESRD (end stage renal disease) on dialysis (HCC)    Essential hypertension    High blood pressure    High cholesterol    History of blood transfusion    Hyperlipidemia    Neuropathy    hands and feet    KRAIG on CPAP    Renal disorder    Sleep apnea    Visual impairment    glasses    Vocal cord paralysis, unilateral partial     PATIENT STATUS: Initial 12/07/24: Pt admit for ESRD on HD and peripheral polyneuropathy. PMH sig for ESRD on HD (MWF), Diabetes (A1c 6.8% on 12/3/24), G-tube (placed 10/19/24) and others noted above. Pt assessed due to screening at risk for pressure injury (PI) and chronic g-tube. RD also received consult to order and manage tube feedings. Pt known to nutrition services from previous admissions, last seen 10/21/24. Chart reviewed, pt admitted with c/o bilateral foot pain - recently discharged from rehab for hip fracture. PEG tube placed recently s/p aspiration pneumonia and inability to swallow. Discussion with RN, no concerns. Pt visited, spouse at bedside assisting in home TF regimen and weight history. Spouse reports using bolus feedings as continuous feedings over 18 hours was too difficult due to in and out of various doctor appointments therefore was told to switch to bolus feedings - timeframe unknown. Spouse reports doing 1 carton 3x/day (3 cartons total) - spouse reports trying for 3.5 cartons daily but consistently 3 cartons daily. Spouse reports usual body weight (UBW) prior to  getting sick 155-157# - last known around September 2024. Current weight 148# 9.6 oz. EMR weight review, last known weight # on 11/30/24. Per EMR weight review, pt noted weighing 143# 12 oz on 10/2/24 - stable, 155# 11 oz on 9/28/24 - 7.1# or 4.6% weight loss x 3 months (non-significant),  157# 11 oz on 6/1/24 - 9.1# or 5.8% weight loss x 6 months (non-significant) and 166# on 11/30/23 - 17.4# or 10.5% weight loss x 13 months (non-significant). Nutrition focused physical exam (NFPE) completed, see below for details. See nutrition intervention for TF recommendations, start with continuous feedings and transition to bolus feedings prior to discharge.     12/08/24 UPDATE: Pt discussed with RN via phone. Adjusted EN feeds to home regimen, bolus feeds, now that pt is tolerating feeds at goal rate. RN to hold continuous feeds now and start with first bolus at 1600 hrs.      12/9/2024 Update:  Re-evaluated d/t formula unavailability . Tolerating bolus feeding. + BM. Dialysis today. Labs this am were pre-dialysis values with notable hypokalemia and hypophosphatemia. Per discussion with MD, allowing to use Standard formula for few days and should resume to Nepro for when discharge. Resumed EN to continuous and used Jevity 1.5 fiber containing formula. Discussed with RN holding an hour before and after lansoprazole administration.      12/12/2024 Update:  Followed up early d/t need to change formula back to Renal formula. Tolerating current EN at goal rate. HD yesterday with 1500 ml fluid removed. BG noted with recent increase d/t formula change. On basal and corrective factor insulin. Remains on lansoprazole.     Will resume EN back to Renal formula ( Nepro) and Bolus feeding method for long term nutrition therapy.     12/16/2024 Update: Saw pt after dialysis. Dialysis today was discontinued early due to patient going into Afib. 600mL fluid output.  Pt upset at medical status of himself. Tolerating EN at goal rate.  Flush ordered 30mL before and after bolus feed. Continues on lansoprazole. Mild hyponatremia. +BM 12/16.      FOOD/NUTRITION RELATED HISTORY:  Intake:  Per spouse: Nepro bolus feedings: 1 carton 3x/day (total of 3 cartons total). Provides 1260 calories, 57 grams protein and 516 ml free water.  FWF 1 syringe before and after each feeding (unsure exact amount of syringe)  Spouse reports trying to get 3.5 cartons in daily.   Intake Meeting Needs: TF meeting needs     Food Allergies: No Known Food Allergies (NKFA)  Cultural/Ethnic/Caodaism Preferences: Not Obtained    GASTROINTESTINAL: +BM 12/16/24 - Soft brown medium stool x 1 and PEG/G-tube    MEDICATIONS: reviewed   Lansoprazole q morning--on bolus EN feeding now.    metoprolol succinate  25 mg Oral Daily    acetaminophen  500 mg Oral q6h    ampicillin-sulbactam  1.5 g Intravenous q12h    insulin regular human  1-7 Units Subcutaneous 4 times per day    [Held by provider] gabapentin  300 mg Per G Tube BID    aspirin  81 mg Per G Tube Daily    lidocaine-menthol  2 patch Transdermal Daily    epoetin donna  10,000 Units Subcutaneous Once per day on Monday Wednesday Friday    nortriptyline  10 mg Oral Nightly    amiodarone  200 mg Per G Tube Daily    atorvastatin  40 mg Per G Tube Nightly    [Held by provider] clopidogrel  75 mg Oral Daily    fluticasone propionate  2 spray Nasal Daily    insulin degludec  5 Units Subcutaneous Nightly    lansoprazole  30 mg Per G Tube QAM AC    heparin  5,000 Units Subcutaneous Q8H STEPHANIE      dextrose 10%       LABS: reviewed Na 135 12/16/24  A1c 6.8% on 12/3/24  Low K and Phos--resolved.   Not using lyte protocol as pt on dialysis.  Recent Labs     12/14/24  0825 12/16/24  0813   * 123*   BUN 40* 69*   CREATSERUM 2.92* 4.39*   CA 9.6 9.6    135*   K 5.0 5.1    97*   CO2 32.0 33.0*   OSMOCALC 298* 301*     NUTRITION RELATED PHYSICAL FINDINGS:  - Nutrition Focused Physical Exam (NFPE): mild depletion body fat triceps  region  - Fluid Accumulation: none  see RN documentation for details  - Skin Integrity: at risk and Stage 2 posterior scrotum and DTI left heel noted per flowsheet documentation  --> see RN documentation for details    ANTHROPOMETRICS:  HT: 162.6 cm (5' 4\")  WT: 64.4 kg (141 lb 15.6 oz) --12/12, appears stable over the course of admission.    Admit wt : 148#   BMI: Body mass index is 24.37 kg/m².  BMI CLASSIFICATION: 25-29.9 kg/m2 - overweight  IBW: 130 lbs        114% IBW  Usual Body Wt: 155-157 lbs per spouse      95-96% UBW    WEIGHT HISTORY:  Patient Weight(s) for the past 336 hrs:   Weight   12/16/24 0415 64.4 kg (141 lb 15.6 oz)   12/15/24 0509 75.2 kg (165 lb 12.6 oz)   12/14/24 0518 68.9 kg (151 lb 14.4 oz)   12/13/24 0615 68.2 kg (150 lb 5.7 oz)   12/12/24 0604 68.7 kg (151 lb 7.3 oz)   12/10/24 0645 66.6 kg (146 lb 14.4 oz)   12/09/24 0423 67.1 kg (147 lb 14.4 oz)   12/08/24 0650 67.2 kg (148 lb 1.6 oz)   12/06/24 1805 67.4 kg (148 lb 9.6 oz)   12/06/24 1702 67.4 kg (148 lb 9.6 oz)     Wt Readings from Last 10 Encounters:   12/16/24 64.4 kg (141 lb 15.6 oz)   11/30/24 65.8 kg (145 lb)   10/22/24 60.3 kg (132 lb 14.4 oz)   08/22/24 72.5 kg (159 lb 12.8 oz)   08/20/24 71.7 kg (158 lb)   08/13/24 65.6 kg (144 lb 11.2 oz)   08/06/24 70.8 kg (156 lb)   08/01/24 66.5 kg (146 lb 8 oz)   06/27/24 73.9 kg (163 lb)   06/13/24 71 kg (156 lb 8 oz)     NUTRITION DIAGNOSIS/PROBLEM:   Inadequate enteral nutrition infusion related to Infusion volume not reached or schedule for infusion interrupted as evidenced by TF currently not running   Nutrition Diagnosis Progress: Improvement (unresolved) EN at full nutrition.     Increased nutrient needs related to increased demand of nutrient as evidenced by pressure injuries per flowsheet   Nutrition Diagnosis Progress:    Remains of concern    NUTRITION INTERVENTION:   NUTRITION PRESCRIPTION:   Estimated Nutrition needs: --dosing wt of 67.4 kg - wt taken on 12/6/24  Calories:  9050-6824 calories/day (MSJ 1314 kcal x 1.3 AF x 1.0 IF or 25-30  calories per kg Dosing wt)   Protein: 67-81 g protein/day (1.0-1.2 g protein/kg Dosing wt)  Fluid Needs: 1200 ml (20ml/kg 59 kg IBW)  Adjust per clinical status (ESRD)    - Diet:       Procedures    NPO   - Enteral Nutrition ( EN) :  RD updated flush order  Bolus feeding Nepro 4.5 cartons daily via G-tube  (1 @ 0800, 1.5 @ 1200, 1 @ 1600, 1 @ 2000). FWF 30 ml before and after each feeding.   Provides: 1890 calories, 86 grams protein, 774 ml free water. Total water: 1,014 ml total water.  Met 100% of nutrition needs and or >100% of RDIs    - RD Malnutrition Care Plan:  Long term use of Nutrition support therapy ( NST)  - Vitamin and mineral supplements: none  - Nutrition education: assess education needs   - Coordination of nutrition care: collaboration with other providers  - Discharge and transfer of nutrition care to new setting or provider: monitor plans    MONITOR AND EVALUATE/NUTRITION GOALS:  - Food and Nutrient Administration:      Monitor: tolerance to enteral nutrition, adequacy of enteral nutrition, resumption of enteral nutrition, and for enteral nutrition adjustment  - Anthropometric Measurement:    Monitor weight  - Nutrition Goals:    maintain wt within 5%, tube feed meets greater than 80% of needs, labs within acceptable limits, minimize lean body mass loss, support wound healing, support body systems, and improved GI status      DIETITIAN FOLLOW UP: RD to follow and monitor nutrition status      ROSS Ibarra  Clinical Dietitian  P: [phone]

## 2024-12-17 ENCOUNTER — APPOINTMENT (OUTPATIENT)
Dept: CV DIAGNOSTICS | Facility: HOSPITAL | Age: 79
End: 2024-12-17
Attending: INTERNAL MEDICINE
Payer: MEDICARE

## 2024-12-17 ENCOUNTER — APPOINTMENT (OUTPATIENT)
Dept: GENERAL RADIOLOGY | Facility: HOSPITAL | Age: 79
End: 2024-12-17
Attending: ANESTHESIOLOGY
Payer: MEDICARE

## 2024-12-17 LAB
ALBUMIN SERPL-MCNC: 3.6 G/DL (ref 3.2–4.8)
ANION GAP SERPL CALC-SCNC: 7 MMOL/L (ref 0–18)
ANION GAP SERPL CALC-SCNC: 7 MMOL/L (ref 0–18)
BASOPHILS # BLD AUTO: 0.04 X10(3) UL (ref 0–0.2)
BASOPHILS NFR BLD AUTO: 0.4 %
BUN BLD-MCNC: 55 MG/DL (ref 9–23)
BUN BLD-MCNC: 55 MG/DL (ref 9–23)
BUN/CREAT SERPL: 14.7 (ref 10–20)
BUN/CREAT SERPL: 14.7 (ref 10–20)
CALCIUM BLD-MCNC: 9.8 MG/DL (ref 8.7–10.4)
CALCIUM BLD-MCNC: 9.8 MG/DL (ref 8.7–10.4)
CHLORIDE SERPL-SCNC: 98 MMOL/L (ref 98–112)
CHLORIDE SERPL-SCNC: 98 MMOL/L (ref 98–112)
CO2 SERPL-SCNC: 31 MMOL/L (ref 21–32)
CO2 SERPL-SCNC: 31 MMOL/L (ref 21–32)
CREAT BLD-MCNC: 3.74 MG/DL
CREAT BLD-MCNC: 3.74 MG/DL
DEPRECATED RDW RBC AUTO: 62.6 FL (ref 35.1–46.3)
EGFRCR SERPLBLD CKD-EPI 2021: 16 ML/MIN/1.73M2 (ref 60–?)
EGFRCR SERPLBLD CKD-EPI 2021: 16 ML/MIN/1.73M2 (ref 60–?)
EOSINOPHIL # BLD AUTO: 0.22 X10(3) UL (ref 0–0.7)
EOSINOPHIL NFR BLD AUTO: 2.4 %
ERYTHROCYTE [DISTWIDTH] IN BLOOD BY AUTOMATED COUNT: 17.7 % (ref 11–15)
GLUCOSE BLD-MCNC: 108 MG/DL (ref 70–99)
GLUCOSE BLD-MCNC: 108 MG/DL (ref 70–99)
GLUCOSE BLDC GLUCOMTR-MCNC: 119 MG/DL (ref 70–99)
GLUCOSE BLDC GLUCOMTR-MCNC: 142 MG/DL (ref 70–99)
GLUCOSE BLDC GLUCOMTR-MCNC: 236 MG/DL (ref 70–99)
HCT VFR BLD AUTO: 28.1 %
HGB BLD-MCNC: 8.9 G/DL
IMM GRANULOCYTES # BLD AUTO: 0.08 X10(3) UL (ref 0–1)
IMM GRANULOCYTES NFR BLD: 0.9 %
LYMPHOCYTES # BLD AUTO: 1.12 X10(3) UL (ref 1–4)
LYMPHOCYTES NFR BLD AUTO: 12 %
MCH RBC QN AUTO: 31.1 PG (ref 26–34)
MCHC RBC AUTO-ENTMCNC: 31.7 G/DL (ref 31–37)
MCV RBC AUTO: 98.3 FL
MONOCYTES # BLD AUTO: 0.87 X10(3) UL (ref 0.1–1)
MONOCYTES NFR BLD AUTO: 9.3 %
NEUTROPHILS # BLD AUTO: 6.98 X10 (3) UL (ref 1.5–7.7)
NEUTROPHILS # BLD AUTO: 6.98 X10(3) UL (ref 1.5–7.7)
NEUTROPHILS NFR BLD AUTO: 75 %
OSMOLALITY SERPL CALC.SUM OF ELEC: 298 MOSM/KG (ref 275–295)
OSMOLALITY SERPL CALC.SUM OF ELEC: 298 MOSM/KG (ref 275–295)
PHOSPHATE SERPL-MCNC: 2.6 MG/DL (ref 2.4–5.1)
PLATELET # BLD AUTO: 231 10(3)UL (ref 150–450)
POTASSIUM SERPL-SCNC: 4.9 MMOL/L (ref 3.5–5.1)
POTASSIUM SERPL-SCNC: 4.9 MMOL/L (ref 3.5–5.1)
RBC # BLD AUTO: 2.86 X10(6)UL
SODIUM SERPL-SCNC: 136 MMOL/L (ref 136–145)
SODIUM SERPL-SCNC: 136 MMOL/L (ref 136–145)
WBC # BLD AUTO: 9.3 X10(3) UL (ref 4–11)

## 2024-12-17 PROCEDURE — 99232 SBSQ HOSP IP/OBS MODERATE 35: CPT | Performed by: ANESTHESIOLOGY

## 2024-12-17 PROCEDURE — 93306 TTE W/DOPPLER COMPLETE: CPT | Performed by: INTERNAL MEDICINE

## 2024-12-17 PROCEDURE — 99233 SBSQ HOSP IP/OBS HIGH 50: CPT | Performed by: INTERNAL MEDICINE

## 2024-12-17 PROCEDURE — 62323 NJX INTERLAMINAR LMBR/SAC: CPT | Performed by: ANESTHESIOLOGY

## 2024-12-17 PROCEDURE — 3E0R33Z INTRODUCTION OF ANTI-INFLAMMATORY INTO SPINAL CANAL, PERCUTANEOUS APPROACH: ICD-10-PCS | Performed by: ANESTHESIOLOGY

## 2024-12-17 RX ORDER — LIDOCAINE HYDROCHLORIDE 10 MG/ML
INJECTION, SOLUTION EPIDURAL; INFILTRATION; INTRACAUDAL; PERINEURAL AS NEEDED
Status: DISCONTINUED | OUTPATIENT
Start: 2024-12-17 | End: 2024-12-17 | Stop reason: HOSPADM

## 2024-12-17 RX ORDER — ALBUMIN (HUMAN) 12.5 G/50ML
25 SOLUTION INTRAVENOUS
Status: ACTIVE | OUTPATIENT
Start: 2024-12-19 | End: 2024-12-20

## 2024-12-17 RX ORDER — METHYLPREDNISOLONE ACETATE 80 MG/ML
INJECTION, SUSPENSION INTRA-ARTICULAR; INTRALESIONAL; INTRAMUSCULAR; SOFT TISSUE AS NEEDED
Status: DISCONTINUED | OUTPATIENT
Start: 2024-12-17 | End: 2024-12-17 | Stop reason: HOSPADM

## 2024-12-17 RX ORDER — CLOPIDOGREL BISULFATE 75 MG/1
75 TABLET ORAL DAILY
Status: DISCONTINUED | OUTPATIENT
Start: 2024-12-18 | End: 2024-12-23

## 2024-12-17 RX ORDER — METOPROLOL TARTRATE 1 MG/ML
5 INJECTION, SOLUTION INTRAVENOUS ONCE
Status: DISCONTINUED | OUTPATIENT
Start: 2024-12-17 | End: 2024-12-22

## 2024-12-17 NOTE — PROCEDURES
Procedure interlaminar epidural steroid injection L5-S1 under fluoroscopic guidance  Preop diagnosis prior lumbar surgery lumbar spinal stenosis with neurogenic claudication  Postop diagnosis is the same  Patient taken to fluoroscopy suite placed in the prone position sterile prep and dressed in usual fashion L5-S1 is isolated fluoroscopic guidance and marked 5 mL of 1% lidocaine was used anesthetize the skin and subcutaneous tissue an 18-gauge Touhy needle inserted into the epidural space using loss-of-resistance technique and the first pass once the epidural space was found no aspiration of fluid paresthesia heme or CSF 80 mg Depo-Medrol 2 cc of normal saline injected into the epidural space tolerated procedure well monitor appropriate taken with recovery room discharge in good condition images stored and retrievable

## 2024-12-17 NOTE — CDS QUERY
Dear Dr Rey,  Can clinical validation of Acute respiratory failure, hypoxia be clarified?  ( ) Acute respiratory failure is ruled out  (x ) acute respiratory failure is valid - please provided additional supporting clinical indicators here: ____hypoxia, aspiration pna____________________  () Other- please specify:_______________________________    Clinical indicators:  SPO2 86% on RA  96-98% on 2L  P/F ratio 321   Respiration 18-20   ABG 12/14 PH 7.47 PCo2 45 PO2 81 HCO3 31.4   Progress cori 12/14: (subjective):  complaining of more shortness of breath slightly hypoxic high 80s placed on 2 L room air.  Progress notes 12/14-12/16: Acute respiratory failure, confusion/hypoxia: Significant event 12/14 probable aspiration pneumonia  Possibly secondary to narcotics although concern for aspiration versus pneumonia  Risk factors: advance age, ESRD on HD, Peripheral polyneuropathy, on narcotic BLE pain  Treatment: IV Unasyn, supplement Oxygen      Use of terms such as suspected, possible, or probable (associated with a specific diagnosis that is being evaluated, monitored, or treated as if it exists) are acceptable and can be coded in the inpatient setting, when documented at the time of discharge.     Please add any additional documentation to your progress note and continue to document this through discharge.      If you have any questions, please contact Clinical Documentation  Specialist:  YOBANI Arias at [phone]     Thank You!     THIS FORM IS A PERMANENT PART OF THE MEDICAL RECORD

## 2024-12-17 NOTE — PROGRESS NOTES
Wife present at bedside.     12/17/24 1418   Wound 12/06/24 Heel Left   Date First Assessed/Time First Assessed: 12/06/24 1702   Present on Original Admission: Yes  Location: Heel  Wound Location Orientation: Left   Wound Image    Site Assessment Dry;Purple;Brown;Pink;Other (Comment)  (Blister)   Drainage Amount None   Dressing Aquacel Foam;Kerlix roll;Tape  (Silicone cream to foot)   Dressing Changed Changed   Dressing Status Clean;Dry;Intact   Wound Depth (cm) 0 cm   Margins Flat and Intact   Non-staged Wound Description Not applicable   Merle-wound Assessment Dry;Intact   Wound Bed Epithelium (%) 100 %   State of Healing Non-healing   Wound Odor None   Pressure Injury Stage DTPI   Wound 12/17/24 Toe (Comment which one) Dorsal;Right   Date First Assessed/Time First Assessed: 12/17/24 1417   Location: Toe (Comment which one)  Wound Location Orientation: (c) Dorsal;Right   Wound Image    Site Assessment Dry;Yellow;Brown   Drainage Amount None   Treatments Topical (Barrier/Moisturizer/Ointment)   Dressing Open to air  (Silicone cream)   Dressing Changed New   Dressing Status Intact   Wound Depth (cm) 0 cm   Margins Flat and Intact   Non-staged Wound Description Not applicable   Wound 12/17/24 Ankle Right;Lateral   Date First Assessed/Time First Assessed: 12/17/24 1419   Location: Ankle  Wound Location Orientation: Right;Lateral   Wound Image    Site Assessment Dry;Brown;Pink   Drainage Amount None   Treatments Topical (Barrier/Moisturizer/Ointment)   Dressing Kerlix roll;Tape  (Silicone cream)   Dressing Changed New   Dressing Status Clean;Dry;Intact   Wound Depth (cm) 0 cm   Margins Flat and Intact   Non-staged Wound Description Not applicable   Merle-wound Assessment Dry;Intact;Hyperpigmented   Wound Odor None   Pressure Injury Stage DTPI   Wound 12/17/24 Foot Left;Lateral   Date First Assessed/Time First Assessed: 12/17/24 1421   Location: Foot  Wound Location Orientation: Left;Lateral   Wound Image    Site  Assessment Dry;Brown;Painful  (Blister)   Drainage Amount None   Treatments Topical (Barrier/Moisturizer/Ointment)   Dressing Kerlix roll;Tape  (Silicone cream)   Dressing Changed New   Dressing Status Clean;Dry;Intact   Wound Depth (cm) 0 cm   Margins Flat and Intact   Non-staged Wound Description Not applicable   Merle-wound Assessment Dry;Hyperpigmented   Wound Odor None

## 2024-12-17 NOTE — CHRONIC PAIN
[facility]  Inpatient Pain Management Progress Note      Patient name: Ollie Hernández 79 year old male  : 1945  MRN: [id number]    Diagnosis:   1. Peripheral polyneuropathy    2. ESRD on hemodialysis (HCC)        Reason for Consult: LE pain    Current hospital day: Hospital Day: 12    Pain Scores:  On HD now 7    Subjective:   Patient in HD this AM. Per wife and patient still c/o bilateral leg pain.. MRI showing spinal stenosis worse at the L4-5 level.   Patient is pain is moderately controlled.  He states the medications that we are giving him make him sleepy but do allow him to feel better.  He still reports occasional shocks in his bilateral feet.      Pain Meds   Acetaminophen [id number]  Gabapentin (stopped)  Nortriptyline (10mg nightly)  Lidocaine patch  Norco (stopped due to AMS)      History:  Past Medical History:    Anemia    Asthma (HCC)    Back problem    BPH (benign prostatic hyperplasia)    Calculus of kidney    Cataract    Coronary atherosclerosis    Diabetes (HCC)    ESRD (end stage renal disease) on dialysis ([facility])    Essential hypertension    High blood pressure    High cholesterol    History of blood transfusion    Hyperlipidemia    Neuropathy    hands and feet    KRAIG on CPAP    Renal disorder    Sleep apnea    Visual impairment    glasses    Vocal cord paralysis, unilateral partial     Past Surgical History:   Procedure Laterality Date    Appendectomy          Back surgery      Neck/back -     Capsule endoscopy - internal referral      Cataract extraction Right 2022    PHACOEMULSIFICATION WITH POSTERIOR CHAMBER INTRAOCULAR LENS, RIGHT EYE    Cataract extraction Left 2021    PHACOEMULSIFICATION WITH POSTERIOR CHAMBER INTRAOCULAR LENS, LEFT EYE    Cath bare metal stent (bms)      Cath drug eluting stent  2024    Successful IVUS guided PCI of the circumflex with a ABIMBOLA    Colonoscopy      Colonoscopy N/A 2021    Procedure: COLONOSCOPY;  Surgeon:  Michael Gautam MD;  [facility]    Colonoscopy N/A 06/03/2024    Procedure: COLONOSCOPY;  Surgeon: Gabriel Saldana MD;  Location: [facility] ENDOSCOPY    Colonoscopy N/A 06/15/2024    Procedure: COLONOSCOPY;  Surgeon: Carlyn Storey MD;  Location: [facility] ENDOSCOPY    Colonoscopy N/A 07/31/2024    Procedure: COLONOSCOPY;  Surgeon: Gabriel Saldana MD;  Location: [facility] ENDOSCOPY    Dialysis procedure  02/17/2023    right internal jugular tunneled dialysis catheter placement.    Hand/finger surgery unlisted      Accidental trauma    Spine surgery procedure unlisted      Total hip arthroplasty Left 10/04/2024    Left anterior total hip arthroplasty    Upper gi endoscopy,diagnosis       Family History   Problem Relation Age of Onset    Cancer Father         Kidney    Breast Cancer Mother     Diabetes Mother     Diabetes Maternal Grandmother     Diabetes Maternal Grandfather     Heart Disorder Other         Uncle      reports that he quit smoking about 43 years ago. His smoking use included cigarettes. He started smoking about 61 years ago. He has a 17 pack-year smoking history. He has never used smokeless tobacco. He reports that he does not drink alcohol and does not use drugs.    Allergies:  Allergies[1]    Current Medications:  Scheduled Meds:   metoprolol tartrate  50 mg Oral 2x Daily(Beta Blocker)    acetaminophen  500 mg Oral q6h    ampicillin-sulbactam  1.5 g Intravenous q12h    insulin regular human  1-7 Units Subcutaneous 4 times per day    [Held by provider] gabapentin  300 mg Per G Tube BID    aspirin  81 mg Per G Tube Daily    lidocaine-menthol  2 patch Transdermal Daily    epoetin donna  10,000 Units Subcutaneous Once per day on Monday Wednesday Friday    nortriptyline  10 mg Oral Nightly    amiodarone  200 mg Per G Tube Daily    atorvastatin  40 mg Per G Tube Nightly    [Held by provider] clopidogrel  75 mg Oral Daily    fluticasone propionate  2 spray Nasal Daily    insulin degludec  5 Units Subcutaneous  Nightly    lansoprazole  30 mg Per G Tube QAM AC    heparin  5,000 Units Subcutaneous Q8H STEPHANIE     Continuous Infusions:   dextrose 10%       PRN Meds:.  [START ON 12/19/2024] sodium chloride **AND** [START ON 12/19/2024] albumin human    sodium chloride **AND** albumin human    traMADol    ipratropium-albuterol    [Held by provider] LORazepam    heparin    lidocaine-prilocaine    dextrose 10%    lipase-protease-amylase (Lip-Prot-Amyl) **AND** sodium bicarbonate    metoprolol **OR** metoprolol    [Held by provider] HYDROmorphone **OR** [Held by provider] HYDROmorphone **OR** [Held by provider] HYDROmorphone    glucose **OR** glucose **OR** glucose-vitamin C **OR** dextrose **OR** glucose **OR** glucose **OR** glucose-vitamin C    acetaminophen **OR** [DISCONTINUED] HYDROcodone-acetaminophen **OR** [DISCONTINUED] HYDROcodone-acetaminophen    polyethylene glycol (PEG 3350)    sennosides    bisacodyl    Exam:Blood pressure 132/71, pulse 92, temperature 98.4 °F (36.9 °C), temperature source Oral, resp. rate 18, height 5' 4\" (1.626 m), weight 156 lb 15.5 oz (71.2 kg), SpO2 100%.    Imaging:   Lumbar MRI  CONCLUSION:      1. Multilevel degenerative changes of the lumbar spine, which are described in detail above and are similar when compared to 02/07/2023.   2. At L4-L5, there is severe canal stenosis, severe bilateral subarticular zone stenosis, and moderate to severe bilateral foraminal narrowing.   3. Redemonstrated postoperative changes from prior decompressive laminectomies at L2-L4.   4. Grade 1 anterolisthesis of L4 on L5.   5. No acute fracture or traumatic listhesis of the lumbar spine.   6. Multifocal abnormal increased T2/STIR signal involving the paraspinal musculature, which may reflect atrophy, denervation, or less likely reflect myositis.   7. Circumferential bladder wall thickening, which may be secondary to underdistention, cystitis, or chronic outlet obstruction.   8. Lesser incidental findings described  above.          Assessment:  79 year old male with past medical history of T2DM, HTN, HLD, ESRD on HD MWF via R AVF, CAD s/p PCI (5/2024), A.fib on AC, HFpEF, KRAIG, BPH, history of recurrent gastrointestinal bleeding, who presented with bilateral lower extremity pain.    Pain management was consulted for bilateral lower extremities pain in the setting of peripheral neuropathy. MRI was also done showing severe canal stenosis at L4-5. Patient pain is likely multifactorial from both diabetic neuropathy and spinal stenosis. History is more indicative of peripheral neuropathy pain.     Currently patient states his pain is moderately controlled on his current regimen.  He states the ultram helps his pain however it does make him tired.  He is on low-dose gabapentin given his renal failure.  He is also on nortriptyline 10 mg nightly.    Pain Meds   Acetaminophen [id number]  Gabapentin (stopped)  Nortriptyline (10mg nightly)  Lidocaine patch  Norco (stopped due to AMS)  ultram    Plan:  - Plavix being held for LESI on 12/17 for spinal stenosis and dorothea radiculopathy   - Severe confusion resolved .   -Continue lidocaine patch and nortiptyline   - Pain controlled today on evaluation. Will add tramadol 50mg PRN as needed, with goal of being less sedating that Norco  -Tylenol as needed  - LESI today    Total Time: 20 minutes     Lengthy discussion of risks, benefits, and alternative treatments were reviewed to patients satisfaction. All questions were answered. Patient agreeable to plan. Greater than 50% of the time was spent with counseling (nature of discussion centered around pain, therapy, and treatment options), face to face time, time spent reviewing data, obtaining patient information and discussing the care with the patients health care providers.     Chronic Pain Service [phone]         [1]   Allergies  Allergen Reactions    Adhesive Tape OTHER (SEE COMMENTS)     Severe rashes    Dust Mite Extract RASH

## 2024-12-17 NOTE — CDS QUERY
Dear Dr Joyce    Can confusion be further specified?    ( x) encephalopathy - specify ( ) toxic (x ) metabolic Toxic ( ) other- please specify:__________    () other- please specify:______________________      Clinical indicators     Flow sheet Neurological:  somnolent, AO- person place situation disoriented to time      Risk factors: advance age, ESRD on HD,   Peripheral polyneuropathy, BLE pain on gabapentin, lidocaine menthol patch,nortriptyline , Norco, hydromorphone       Progress note 12/14: episode of \"confusion\" where he did not know where he was but now he recognizes his wife    Progress notes 12/14- 12/16: Acute respiratory failure, confusion/hypoxia: Significant event 12/14/2020 Possibly secondary to narcotics although concern for aspiration versus pneumonia    Pain service note 12/14: - Hold all pain meds temporarily given severe confusion.  12/15:  severe confusion resolved       Treatment: Hold pain meds, IV unasyn, Supplement Oxygen            Use of terms such as suspected, possible, or probable (associated with a specific diagnosis that is being evaluated, monitored, or treated as if it exists) are acceptable and can be coded in the inpatient setting, when documented at the time of discharge.      Please add any additional documentation to your progress note and continue to document this through discharge.    f you have any questions, please contact Clinical Documentation  Specialist:  YOBANI Arias at [phone]  Thank You!     THIS FORM IS A PERMANENT PART OF THE MEDICAL RECORD

## 2024-12-17 NOTE — PLAN OF CARE
Patient had dialysis session today. Tolerated without complications, 2L removed per dialysis RN. Patient educated at bedside by dialysis RN about importance of fluid and electrolyte balance and maintaining normal blood pressure. Re-educated by myself.     Problem: Patient Centered Care  Goal: Patient preferences are identified and integrated in the patient's plan of care  Description: Interventions:  - What would you like us to know as we care for you? I am from home with my wife  - Provide timely, complete, and accurate information to patient/family  - Incorporate patient and family knowledge, values, beliefs, and cultural backgrounds into the planning and delivery of care  - Encourage patient/family to participate in care and decision-making at the level they choose  - Honor patient and family perspectives and choices  Outcome: Progressing     Problem: Diabetes/Glucose Control  Goal: Glucose maintained within prescribed range  Description: INTERVENTIONS:  - Monitor Blood Glucose as ordered  - Assess for signs and symptoms of hyperglycemia and hypoglycemia  - Administer ordered medications to maintain glucose within target range  - Assess barriers to adequate nutritional intake and initiate nutrition consult as needed  - Instruct patient on self management of diabetes  Outcome: Progressing     Problem: Patient/Family Goals  Goal: Patient/Family Long Term Goal  Description: Patient's Long Term Goal: Discharge    Interventions:  - Monitor vitals  - Monitor appropriate labs  - Administer medications as ordered  - Follow MD's orders  - Update patient on plan of care   - Discharge planning     - See additional Care Plan goals for specific interventions  Outcome: Progressing  Goal: Patient/Family Short Term Goal  Description: Patient's Short Term Goal:     Interventions:   -   - See additional Care Plan goals for specific interventions  Outcome: Progressing     Problem: MUSCULOSKELETAL - ADULT  Goal: Return mobility to  safest level of function  Description: INTERVENTIONS:  - Assess patient stability and activity tolerance for standing, transferring and ambulating w/ or w/o assistive devices  - Assist with transfers and ambulation using safe patient handling equipment as needed  - Ensure adequate protection for wounds/incisions during mobilization  - Obtain PT/OT consults as needed  - Advance activity as appropriate  - Communicate ordered activity level and limitations with patient/family  Outcome: Progressing     Problem: PAIN - ADULT  Goal: Verbalizes/displays adequate comfort level or patient's stated pain goal  Description: INTERVENTIONS:  - Encourage pt to monitor pain and request assistance  - Assess pain using appropriate pain scale  - Administer analgesics based on type and severity of pain and evaluate response  - Implement non-pharmacological measures as appropriate and evaluate response  - Consider cultural and social influences on pain and pain management  - Manage/alleviate anxiety  - Utilize distraction and/or relaxation techniques  - Monitor for opioid side effects  - Notify MD/LIP if interventions unsuccessful or patient reports new pain  - Anticipate increased pain with activity and pre-medicate as appropriate  Outcome: Progressing     Problem: RISK FOR INFECTION - ADULT  Goal: Absence of fever/infection during anticipated neutropenic period  Description: INTERVENTIONS  - Monitor WBC  - Administer growth factors as ordered  - Implement neutropenic guidelines  Outcome: Progressing     Problem: SAFETY ADULT - FALL  Goal: Free from fall injury  Description: INTERVENTIONS:  - Assess pt frequently for physical needs  - Identify cognitive and physical deficits and behaviors that affect risk of falls.  - Ferney fall precautions as indicated by assessment.  - Educate pt/family on patient safety including physical limitations  - Instruct pt to call for assistance with activity based on assessment  - Modify environment to  reduce risk of injury  - Provide assistive devices as appropriate  - Consider OT/PT consult to assist with strengthening/mobility  - Encourage toileting schedule  Outcome: Progressing     Problem: DISCHARGE PLANNING  Goal: Discharge to home or other facility with appropriate resources  Description: INTERVENTIONS:  - Identify barriers to discharge w/pt and caregiver  - Include patient/family/discharge partner in discharge planning  - Arrange for needed discharge resources and transportation as appropriate  - Identify discharge learning needs (meds, wound care, etc)  - Arrange for interpreters to assist at discharge as needed  - Consider post-discharge preferences of patient/family/discharge partner  - Complete POLST form as appropriate  - Assess patient's ability to be responsible for managing their own health  - Refer to Case Management Department for coordinating discharge planning if the patient needs post-hospital services based on physician/LIP order or complex needs related to functional status, cognitive ability or social support system  Outcome: Progressing     Problem: METABOLIC/FLUID AND ELECTROLYTES - ADULT  Goal: Glucose maintained within prescribed range  Description: INTERVENTIONS:  - Monitor Blood Glucose as ordered  - Assess for signs and symptoms of hyperglycemia and hypoglycemia  - Administer ordered medications to maintain glucose within target range  - Assess barriers to adequate nutritional intake and initiate nutrition consult as needed  - Instruct patient on self management of diabetes  Outcome: Progressing  Goal: Electrolytes maintained within normal limits  Description: INTERVENTIONS:  - Monitor labs and rhythm and assess patient for signs and symptoms of electrolyte imbalances  - Administer electrolyte replacement as ordered  - Monitor response to electrolyte replacements, including rhythm and repeat lab results as appropriate  - Fluid restriction as ordered  - Instruct patient on fluid and  nutrition restrictions as appropriate  Outcome: Progressing  Goal: Hemodynamic stability and optimal renal function maintained  Description: INTERVENTIONS:  - Monitor labs and assess for signs and symptoms of volume excess or deficit  - Monitor intake, output and patient weight  - Monitor urine specific gravity, serum osmolarity and serum sodium as indicated or ordered  - Monitor response to interventions for patient's volume status, including labs, urine output, blood pressure (other measures as available)  - Encourage oral intake as appropriate  - Instruct patient on fluid and nutrition restrictions as appropriate  Outcome: Progressing

## 2024-12-17 NOTE — PROGRESS NOTES
[facility]  part of [facility]    Progress Note    Ollie Hernández Patient Status:  Inpatient    1945 MRN [id number]   Location [facility] 5SW/SE Attending Lizbeth Rey MD   Hosp Day # 11 PCP Wili Parmar MD       Subjective:   Ollie Hernández is a(n) 79 year old male who I am seeing for ESRD    Seen on dialysis      Objective:   /71 (BP Location: Left arm)   Pulse 92   Temp 98.4 °F (36.9 °C) (Oral)   Resp 18   Ht 5' 4\" (1.626 m)   Wt 156 lb 15.5 oz (71.2 kg)   SpO2 100%   BMI 26.94 kg/m²      Intake/Output Summary (Last 24 hours) at 2024 1100  Last data filed at 2024 0900  Gross per 24 hour   Intake 2076 ml   Output 600 ml   Net 1476 ml     Wt Readings from Last 1 Encounters:   24 156 lb 15.5 oz (71.2 kg)       Exam  Gen: No acute distress, Heent: NC AT, mucous memb clear, neck supple  Pulm: Lungs clear, normal respiratory effort  CV: Heart with regular rate and rhythm, no edema  Abd: Abdomen soft, nontender, nondistended, no organomegaly, bowel sounds present  Skin: no symptoms reported  Psych: alert and oriented  Right upper extremity AV fistula cannulated  Assessment and Plan:     1 - ESRD  Continuing dialysis today as yesterday as was an incomplete session.  He is typically .  Dialysis will need to be continued as it is a life preserving modality    Plan the next dialysis treatment on Thursday if he is here     2 -lower extremity pain  Physical therapy and pain medications     3 -anemia  Status post PRBC last week.  He is on Epogen     4 -A-fib with RVR/coronary artery disease  Has Watchman  The patient is on aspirin and Plavix as well as amiodarone.  Cardiology following     5 -hypertension with ESRD  Pressure has been running very low.  Metoprolol dose was dropped     6 -aspiration pneumonia/respiratory failure  On Unasyn      Discussed with the patient and his family      Results:     Recent Labs   Lab  12/14/24  0825 12/16/24  0813 12/17/24  0534   RBC 2.59* 2.52* 2.86*   HGB 8.0* 7.6* 8.9*   HCT 25.4* 25.1* 28.1*   MCV 98.1 99.6 98.3   MCH 30.9 30.2 31.1   MCHC 31.5 30.3* 31.7   RDW 18.4* 17.8* 17.7*   NEPRELIM 8.67* 6.35 6.98   WBC 11.5* 8.5 9.3   .0 221.0 231.0         Recent Labs   Lab 12/14/24  0825 12/16/24  0813 12/17/24  0534   * 123* 108*  108*   BUN 40* 69* 55*  55*   CREATSERUM 2.92* 4.39* 3.74*  3.74*   CA 9.6 9.6 9.8  9.8    135* 136  136   K 5.0 5.1 4.9  4.9    97* 98  98   CO2 32.0 33.0* 31.0  31.0          No results found.        SONDRA MART MD  12/17/2024

## 2024-12-17 NOTE — PROGRESS NOTES
Cardiology Progress Note  [facility]    Ollie Hernández Patient Status:  Inpatient    1945 MRN [id number]   Location [facility] 5SW/SE Attending Lizbeth Rey MD   Hosp Day # 11 PCP Wili Parmar MD     Primary Cardiologist: Dr. Phelan    Reason for Consultation:  Afib    Subjective:  Doing well. No CV complaints. Had dialysis without issues this morning.    Assessment/Plan:  Afib s/p Watchman 24 (24mm Watchman FLX )  CAD s/p PCI LCX with [facility] ABIMBOLA x 1 (24)   HFpEF  ESRD on iHD  T2DM  KRAIG  HTN  Polyneuropathy, needing MRI    - plavix being held, continue ASA. Okay to restart DAPT when able.  - previously had hypotension so metoprolol dose was dropped. May be why rates are worse. BP is in the 140's now, so back to increased dose. Tolerated dialysis well.  - increase metoprolol to 50mg BID  - IV 5mg prn if needed during dialysis.   - TTE reviewed. RVSP 62 mmHg.   - RVSP 62 mmHg and right pleural effusion- recommend further dialysis and challenge dry weight.  - Tele  - Will continue to follow.    History:  Past Medical History:    Anemia    Asthma (HCC)    Back problem    BPH (benign prostatic hyperplasia)    Calculus of kidney    Cataract    Coronary atherosclerosis    Diabetes (HCC)    ESRD (end stage renal disease) on dialysis (HCC)    Essential hypertension    High blood pressure    High cholesterol    History of blood transfusion    Hyperlipidemia    Neuropathy    hands and feet    KRAIG on CPAP    Renal disorder    Sleep apnea    Visual impairment    glasses    Vocal cord paralysis, unilateral partial     Past Surgical History:   Procedure Laterality Date    Appendectomy          Back surgery      Neck/back -     Capsule endoscopy - internal referral      Cataract extraction Right 2022    PHACOEMULSIFICATION WITH POSTERIOR CHAMBER INTRAOCULAR LENS, RIGHT EYE    Cataract extraction Left 2021    PHACOEMULSIFICATION WITH POSTERIOR CHAMBER INTRAOCULAR  LENS, LEFT EYE    Cath bare metal stent (bms)      Cath drug eluting stent  05/21/2024    Successful IVUS guided PCI of the circumflex with a ABIMBOLA    Colonoscopy      Colonoscopy N/A 01/25/2021    Procedure: COLONOSCOPY;  Surgeon: Michael Gautam MD;  Location: [facility] ENDO    Colonoscopy N/A 06/03/2024    Procedure: COLONOSCOPY;  Surgeon: Gabriel Saldana MD;  Location: [facility] ENDOSCOPY    Colonoscopy N/A 06/15/2024    Procedure: COLONOSCOPY;  Surgeon: Carlny Storey MD;  Location: [facility] ENDOSCOPY    Colonoscopy N/A 07/31/2024    Procedure: COLONOSCOPY;  Surgeon: Gabriel Saldana MD;  Location: [facility] ENDOSCOPY    Dialysis procedure  02/17/2023    right internal jugular tunneled dialysis catheter placement.    Hand/finger surgery unlisted      Accidental trauma    Spine surgery procedure unlisted      Total hip arthroplasty Left 10/04/2024    Left anterior total hip arthroplasty    Upper gi endoscopy,diagnosis       Family History   Problem Relation Age of Onset    Cancer Father         Kidney    Breast Cancer Mother     Diabetes Mother     Diabetes Maternal Grandmother     Diabetes Maternal Grandfather     Heart Disorder Other         Uncle      reports that he quit smoking about 43 years ago. His smoking use included cigarettes. He started smoking about 61 years ago. He has a 17 pack-year smoking history. He has never used smokeless tobacco. He reports that he does not drink alcohol and does not use drugs.    Allergies:  Allergies[1]    Medications:  Medications Ordered Prior to Encounter[2]    Review of Systems:  As above, otherwise negative.      Physical Exam:  Blood pressure 132/71, pulse 92, temperature 98.4 °F (36.9 °C), temperature source Oral, resp. rate 18, height 5' 4\" (1.626 m), weight 156 lb 15.5 oz (71.2 kg), SpO2 100%.  Wt Readings from Last 3 Encounters:   12/17/24 156 lb 15.5 oz (71.2 kg)   11/30/24 145 lb (65.8 kg)   10/22/24 132 lb 14.4 oz (60.3 kg)       General: awake, alert, oriented x 3, no acute  distress  HEENT: at/nc, perrl, eomi  Neck: No JVD, carotids 2+ no bruits.  Cardiac: Regular rate and rhythm, S1, S2 normal, no murmur, rub or gallop.  Lungs: Clear without wheezes, rales, rhonchi or dullness.  Normal excursions and effort.  Abdomen: Soft, non-tender, non-distended, normal bowel sounds   Extremities: Without clubbing, cyanosis or edema.  Peripheral pulses are 2+.  Neurologic: Alert and oriented, normal affect.  Psych: normal mood and affect  Skin: Warm and dry.       Laboratories and Data:  Diagnostics:      Labs:   CBC:    Lab Results   Component Value Date    WBC 9.3 12/17/2024    WBC 8.5 12/16/2024    WBC 11.5 (H) 12/14/2024     Lab Results   Component Value Date    HGB 8.9 (L) 12/17/2024    HGB 7.6 (L) 12/16/2024    HGB 8.0 (L) 12/14/2024      Lab Results   Component Value Date    .0 12/17/2024    .0 12/16/2024    .0 12/14/2024     BMP:   No results found for: \"GLUCOSE\"  Lab Results   Component Value Date    K 4.9 12/17/2024    K 4.9 12/17/2024    K 5.1 12/16/2024     Lab Results   Component Value Date    BUN 55 (H) 12/17/2024    BUN 55 (H) 12/17/2024    BUN 69 (H) 12/16/2024     Lab Results   Component Value Date    CREATSERUM 3.74 (H) 12/17/2024    CREATSERUM 3.74 (H) 12/17/2024    CREATSERUM 4.39 (H) 12/16/2024     Cholesterol:     Lab Results   Component Value Date    CHOLEST 179 09/29/2024    CHOLEST 146 07/31/2024    CHOLEST 153 07/04/2024     Lab Results   Component Value Date    HDL 41 09/29/2024    HDL 29 (L) 07/31/2024    HDL 41 07/04/2024     Lab Results   Component Value Date    TRIG 259 (H) 10/17/2024    TRIG 160 (H) 10/04/2024    TRIG 206 (H) 10/02/2024     Lab Results   Component Value Date     (H) 09/29/2024    LDL 93 07/31/2024     (H) 07/04/2024     Lab Results   Component Value Date    AST 32 12/07/2024    AST 42 (H) 12/06/2024    AST 71 (H) 12/03/2024     Lab Results   Component Value Date    ALT 29 12/07/2024    ALT 38 12/06/2024    ALT 57  (H) 2024           Luis Orozco MD  Interventional Cardiology         [1]   Allergies  Allergen Reactions    Adhesive Tape OTHER (SEE COMMENTS)     Severe rashes    Dust Mite Extract RASH   [2]   Current Facility-Administered Medications on File Prior to Encounter   Medication Dose Route Frequency Provider Last Rate Last Admin    [COMPLETED] acetaminophen (Tylenol) 160 MG/5ML oral liquid 500 mg  500 mg Per G Tube Once Gabriela Allen MD   500 mg at 24 0209    [COMPLETED] gabapentin (Neurontin) 250 MG/5ML oral solution 300 mg  300 mg Per G Tube Once Gabriela Allen MD   300 mg at 24 0440    [COMPLETED] ceFAZolin (Ancef) 2g in 10mL IV syringe premix  2 g Intravenous Once Yash Sheppard MD   2 g at 10/19/24 1036    [] adult 3 in 1 TPN   Intravenous Continuous TPN Pranay Michel MD   Paused at 10/19/24 0250    [COMPLETED] sodium chloride 0.9 % IV bolus 250 mL  250 mL Intravenous Once Lorelei Mata  mL/hr at 10/17/24 0023 250 mL at 10/17/24 0023    [] adult 3 in 1 TPN   Intravenous Continuous TPN Pranay Michel MD 50 mL/hr at 10/17/24 2233 New Bag at 10/17/24 2233    [COMPLETED] dilTIAZem (cardIZEM) 25 mg/5mL injection 10 mg  10 mg Intravenous Once Pranay Michel MD   10 mg at 10/17/24 1330    [COMPLETED] sodium chloride 0.9 % IV bolus 250 mL  250 mL Intravenous Once Pranay Michel  mL/hr at 10/17/24 1700 250 mL at 10/17/24 1700    [COMPLETED] amiodarone (Cordarone) 150 mg in dextrose 5% 100 mL IV bolus  150 mg Intravenous Once Emerson Julio  mL/hr at 10/17/24 1805 150 mg at 10/17/24 1805    [COMPLETED] digoxin (Lanoxin) 250 MCG/ML injection 250 mcg  250 mcg Intravenous Once Herman Phelan MD   250 mcg at 10/17/24 1750    [] sodium chloride 0.9 % IV bolus 100 mL  100 mL Intravenous Q30 Min PRN José Callahan MD        And    [] albumin human (Albumin) 25% injection 25 g  25 g Intravenous PRN Dialysis José Callahan MD        []  dextrose 5%-sodium chloride 0.45% infusion   Intravenous Continuous Lorelei Mata MD 50 mL/hr at 10/16/24 0038 New Bag at 10/16/24 0038    [] adult 3 in 1 TPN   Intravenous Continuous TPN Pranay Michel MD 50 mL/hr at 10/16/24 2118 New Bag at 10/16/24 2118    [COMPLETED] metoprolol (Lopressor) 5 mg/5mL injection 5 mg  5 mg Intravenous Once Herman Phelan MD   5 mg at 10/16/24 2307    [] sodium chloride 0.9 % IV bolus 100 mL  100 mL Intravenous Q30 Min PRN José Callahan MD        And    [] albumin human (Albumin) 25% injection 25 g  25 g Intravenous PRN Dialysis José Callahan MD        [] sodium chloride 0.9 % IV bolus 100 mL  100 mL Intravenous Q30 Min PRN Walker Klein MD        And    [] albumin human (Albumin) 25% injection 25 g  25 g Intravenous PRN Dialysis Walker Klein MD        [COMPLETED] metoprolol (Lopressor) 5 mg/5mL injection 5 mg  5 mg Intravenous Once Radha Dunham MD   5 mg at 10/12/24 0304    [COMPLETED] methylPREDNISolone sodium succinate (Solu-MEDROL) injection 40 mg  40 mg Intravenous BID Jayjay Mijares MD   40 mg at 10/13/24 0919    [] sodium chloride 0.9 % IV bolus 100 mL  100 mL Intravenous Q30 Min PRN Walker Klein MD        And    [] albumin human (Albumin) 25% injection 25 g  25 g Intravenous PRN Dialysis Walker Klein MD   25 g at 10/11/24 1900    [COMPLETED] methylPREDNISolone sodium succinate (Solu-MEDROL) injection 60 mg  60 mg Intravenous Once Jimmie Landeros MD   60 mg at 10/09/24 0148    [COMPLETED] EPINEPHrine-racemic (S-2) 2.25 % nebulizer solution 0.5 mL  0.5 mL Nebulization Once Jimmie Landeros MD   0.5 mL at 10/09/24 0240    [] sodium chloride 0.9 % IV bolus 100 mL  100 mL Intravenous Q30 Min PRN Moni Pugh MD        And    [] albumin human (Albumin) 25% injection 25 g  25 g Intravenous PRN Dialysis Moni Pugh MD        [COMPLETED] methylPREDNISolone sodium succinate  (Solu-MEDROL) injection 125 mg  125 mg Intravenous Once Gordon Borden MD   125 mg at 10/06/24 1823    [COMPLETED] EPINEPHrine-racemic (S-2) 2.25 % nebulizer solution 0.5 mL  0.5 mL Nebulization Once Ollie Santos MD   0.5 mL at 10/06/24 1812    [] methylPREDNISolone sodium succinate (Solu-MEDROL) 125 MG injection             [COMPLETED] potassium chloride (Klor-Con) 20 MEQ oral powder 20 mEq  20 mEq Oral Once Moni Pugh MD   20 mEq at 10/05/24 0556    [COMPLETED] fluconazole (Diflucan) tab 200 mg  200 mg Oral Once Lizbeth Rey MD   200 mg at 10/05/24 1610    [COMPLETED] potassium chloride (Klor-Con) 20 MEQ oral powder 10 mEq  10 mEq Oral Once Moni Pugh MD   10 mEq at 10/04/24 0736    [] ondansetron (Zofran) 4 MG/2ML injection 4 mg  4 mg Intravenous Once PRN Talha rGegg MD        [] prochlorperazine (Compazine) 10 MG/2ML injection 5 mg  5 mg Intravenous Once PRN Talha Gregg MD        [] haloperidol lactate (Haldol) 5 MG/ML injection 0.25 mg  0.25 mg Intravenous Once PRN Talha Gregg MD        [] fentaNYL (Sublimaze) 50 mcg/mL injection 25 mcg  25 mcg Intravenous Q5 Min PRN Talha Gregg MD   25 mcg at 10/04/24 1411    [] fentaNYL (Sublimaze) 50 mcg/mL injection 50 mcg  50 mcg Intravenous Q5 Min PRN Talha Gregg MD        [] HYDROmorphone (Dilaudid) 1 MG/ML injection 0.2 mg  0.2 mg Intravenous Q15 Min PRN Talha Gregg MD        [] HYDROmorphone (Dilaudid) 1 MG/ML injection 0.2 mg  0.2 mg Intravenous Q15 Min PRN Talha Gregg MD        [] HYDROmorphone (Dilaudid) 1 MG/ML injection 0.2 mg  0.2 mg Intravenous Q15 Min PRN Talha Gregg MD        [] atropine 0.1 MG/ML injection 0.5 mg  0.5 mg Intravenous PRN Talha Gregg MD        [] naloxone (Narcan) 0.4 MG/ML injection 0.08 mg  0.08 mg Intravenous PRN Talha Gregg MD        [] sodium chloride 0.9 % IV bolus 500 mL  500 mL Intravenous  Once PRN Humberto Murphy MD        [] diphenhydrAMINE (Benadryl) 50 mg/mL  injection 25 mg  25 mg Intravenous Once PRN Humberto Murphy MD        [COMPLETED] ceFAZolin (Ancef) 1 g in dextrose 5% 100mL IVPB-ADD  1 g Intravenous Q24H Humberto uMrphy  mL/hr at 10/05/24 0833 1 g at 10/05/24 0833    [COMPLETED] clonidine-EPINEPHrine-ropivacaine-ketorolac (CERTS) (Duraclon-Adrenalin-Naropin-Toradol) pain cocktail irrigation   Intra-articular Once (Intra-Op) Humberto Murphy MD   Given at 10/04/24 1211    [] sodium chloride 0.9 % IV bolus 100 mL  100 mL Intravenous Q30 Min PRN Humberto Murphy MD        And    [] albumin human (Albumin) 25% injection 25 g  25 g Intravenous PRN Dialysis Hmuberto Murphy MD        [COMPLETED] tranexamic acid in sodium chloride 0.7% (Cyklokapron) 1000 mg/100mL infusion premix 1,000 mg  1,000 mg Intravenous 30 Min Pre-Op Humberto Murphy MD   1,000 mg at 10/04/24 1149    [COMPLETED] potassium chloride (Klor-Con) 20 MEQ oral powder 20 mEq  20 mEq Oral Once Mirta Redman MD   20 mEq at 10/02/24 2024    [COMPLETED] sodium chloride 0.9 % IV bolus 250 mL  250 mL Intravenous Once Nora Jones APRN   Stopped at 10/01/24 1150    [] sodium chloride 0.9 % IV bolus 100 mL  100 mL Intravenous Q30 Min PRN Moni Pugh MD        And    [] albumin human (Albumin) 25% injection 25 g  25 g Intravenous PRN Dialysis Moni Pugh MD        [] piperacillin-tazobactam (Zosyn) 3.375 g in dextrose 5% 100 mL IVPB-ADDV  3.375 g Intravenous Q12H Jimmie Landeros MD 25 mL/hr at 10/04/24 1900 3.375 g at 10/04/24 1900    [COMPLETED] iopamidol 76% (ISOVUE-370) injection for power injector  80 mL Intravenous ONCE PRN Radha Gabriel MD   80 mL at 24 1126    [COMPLETED] furosemide (Lasix) 10 mg/mL injection 40 mg  40 mg Intravenous Once Nick Moreau MD   40 mg at 24 0607    [] furosemide (Lasix) 10 mg/mL injection             [COMPLETED]  hydrALAzine (Apresoline) 20 mg/mL injection 10 mg  10 mg Intravenous Once Nick Moreau MD   10 mg at 24 0626    [] etomidate (Amidate) 2 mg/mL injection             [COMPLETED] propofol (Diprivan) 10 MG/ML injection        1,000 mg at 24 1436    [COMPLETED] piperacillin-tazobactam (Zosyn) 4.5 g in dextrose 5% 100 mL IVPB-ADDV  4.5 g Intravenous Once Nick Moreau  mL/hr at 24 0853 4.5 g at 24 0853    [] sodium chloride 0.9 % IV bolus 100 mL  100 mL Intravenous Q30 Min PRN José Callahan MD        [COMPLETED] nitroGLYCERIN (Nitrobid) 2 % ointment 1 inch  1 inch Topical Once Nick Moreau MD   1 inch at 24 0654    [COMPLETED] hydrALAzine (Apresoline) 20 mg/mL injection 10 mg  10 mg Intravenous Once Nick Moreau MD   10 mg at 24 0654    [COMPLETED] ondansetron (Zofran) 4 MG/2ML injection        4 mg at 24 1241    [COMPLETED] metoclopramide (Reglan) 5 mg/mL injection 5 mg  5 mg Intravenous Once Karla Irvin APRN   5 mg at 24 1300    [COMPLETED] norepinephrine (Levophed) 4 mg/250mL infusion premix        4,000 mcg at 24 1455    [COMPLETED] succinylcholine (Anectine) 20 MG/ML injection 70.6 mg  1 mg/kg Intravenous Once Atul Sheridan MD   70.6 mg at 24 1445    [COMPLETED] HYDROcodone-acetaminophen (Norco) 5-325 MG per tab 1 tablet  1 tablet Oral Once Vitor Kline MD   1 tablet at 24 1245    [COMPLETED] morphINE PF 4 MG/ML injection 4 mg  4 mg Intravenous Once Vitor Kline MD   4 mg at 24 1448    [COMPLETED] sodium chloride 0.9 % IV bolus 1,000 mL  1,000 mL Intravenous Once Vitor Kline MD 1,000 mL/hr at 24 1447 1,000 mL at 24 1447    [COMPLETED] ondansetron (Zofran) 4 MG/2ML injection 4 mg  4 mg Intravenous Once Vitor Kline MD   4 mg at 24 1448    [COMPLETED] ceFAZolin (Ancef) 2g in 10mL IV syringe premix  2 g Intravenous 30 Min Pre-Op Humberto Murphy MD   2 g at 10/04/24  0915    [COMPLETED] heparin (Porcine) 5000 UNIT/ML injection 5,000 Units  5,000 Units Subcutaneous Once Humberto Murphy MD   5,000 Units at 24 1758    [COMPLETED] dilTIAZem (cardIZEM) 25 mg/5mL injection 20 mg  20 mg Intravenous Once Vitor Kline MD   20 mg at 24 2954     Current Outpatient Medications on File Prior to Encounter   Medication Sig Dispense Refill    acetaminophen 500 MG Oral Tab 1 tablet (500 mg total) by Per G Tube route in the morning and 1 tablet (500 mg total) before bedtime.      Gabapentin 300 MG/6ML Oral Solution 300 mg by Peg Tube route in the morning and 300 mg before bedtime. 450 mL 3    linaGLIPtin (TRADJENTA) 5 mg Oral Tab 1 tablet (5 mg total) by Per G Tube route daily. 90 tablet 3    atorvastatin 40 MG Oral Tab 1 tablet (40 mg total) by Per G Tube route nightly. 90 tablet 3    amiodarone 200 MG Oral Tab 1 tablet (200 mg total) by Per G Tube route daily. 90 tablet 3    carvedilol 25 MG Oral Tab 1 tablet (25 mg total) by Per G Tube route 2 (two) times daily. 180 tablet 3    B Complex-C-Folic Acid (RENAL MULTIVITAMIN FORMULA) Oral Tab 1 tablet by Per G Tube route daily. 90 tablet 3    clopidogrel 75 MG Oral Tab Take 1 tablet (75 mg total) by mouth daily. 90 tablet 3    albuterol (PROAIR HFA) 108 (90 Base) MCG/ACT Inhalation Aero Soln Inhale 2 puffs into the lungs every 4 (four) hours as needed for Wheezing. 3 each 3    fluticasone propionate 50 MCG/ACT Nasal Suspension 2 sprays by Nasal route daily. 48 g 3    [] amoxicillin clavulanate 250-125 MG Oral Tab 1 tablet (250 mg total) by Per G Tube route daily. Taking for swelling to left side of jaw      lansoprazole 30 MG Oral Tablet Delayed Release Dispersible 1 tablet (30 mg total) by Per G Tube route every morning before breakfast. 30 tablet 0    aspirin 81 MG Oral Tab EC Take 1 tablet (81 mg total) by mouth daily. (Patient taking differently: Take 1 tablet (81 mg total) by mouth daily. Per G-tube) 90 tablet 3     cetirizine 10 MG Oral Tab Take 1 tablet (10 mg total) by mouth every other day.      polyethylene glycol, PEG 3350, 17 g Oral Powd Pack 17 g by Per G Tube route daily as needed (Constipation).      Insulin Lispro, 1 Unit Dial, (HUMALOG KWIKPEN) 100 UNIT/ML Subcutaneous Solution Pen-injector Take subcutaneously 4 times daily before tube feedings per sliding scale. Max total dose of 20 units. (Patient not taking: Reported on 2024) 6 mL 1    [] glucagon (GVOKE HYPOPEN 2-PACK) 1 MG/0.2ML Subcutaneous injection Inject 0.2 mL (1 mg total) into the skin once as needed for Low blood glucose. 1 each 0    Insulin Pen Needle 33G X 4 MM Does not apply Misc 1 each 4 (four) times daily. 200 each 1    albuterol (2.5 MG/3ML) 0.083% Inhalation Nebu Soln Take 3 mL (2.5 mg total) by nebulization every 4 (four) hours as needed for Wheezing or Shortness of Breath. (Patient not taking: Reported on 2024) 360 mL 3    HYDROcodone-acetaminophen 5-325 MG Oral Tab 1 tablet by Per G Tube route every 8 (eight) hours as needed. (Patient not taking: Reported on 2024) 90 tablet 0    Incontinence Supply Disposable (COMFORT PROTECT ADULT DIAPER/L) Does not apply Misc 1 Application daily. 30 each 3    Electronic Thermometer ([employer] DIGITAL THERMOMETER TEMPLE) Does not apply Misc Check temperature 1 each 0    insulin glargine (LANTUS SOLOSTAR) 100 UNIT/ML Subcutaneous Solution Pen-injector Inject 8 Units into the skin every morning. (Patient not taking: Reported on 2024) 15 mL 0    Insulin Pen Needle 32G X 4 MM Does not apply Misc Take as directed 200 each 3    Budesonide-Formoterol Fumarate (SYMBICORT) 160-4.5 MCG/ACT Inhalation Aerosol Inhale 2 puffs into the lungs 2 (two) times daily. (Patient not taking: Reported on 2024) 3 each 3    Starch-Maltodextrin (THICK-IT) Oral Powd Pack Use as directed (Patient not taking: Reported on 2024) 200 each 3    albuterol (2.5 MG/3ML) 0.083% Inhalation Nebu Soln Take 3 mL (2.5 mg  total) by nebulization in the morning and 3 mL (2.5 mg total) before bedtime. (Patient not taking: Reported on 12/3/2024)      lidocaine 4 % External Patch Place 1 patch onto the skin daily. (Patient not taking: Reported on 12/3/2024)      acetaminophen 500 MG Oral Tab Take 1 tablet (500 mg total) by mouth every 6 (six) hours as needed for Pain. (Patient not taking: Reported on 12/6/2024)      Cholecalciferol 125 MCG (5000 UT) Oral Tab Take 1 tablet (5,000 Units total) by mouth 2 (two) times daily. (Patient not taking: Reported on 12/3/2024)

## 2024-12-17 NOTE — PLAN OF CARE
Problem: Patient Centered Care  Goal: Patient preferences are identified and integrated in the patient's plan of care  Description: Interventions:  - What would you like us to know as we care for you? I am from home with my wife  - Provide timely, complete, and accurate information to patient/family  - Incorporate patient and family knowledge, values, beliefs, and cultural backgrounds into the planning and delivery of care  - Encourage patient/family to participate in care and decision-making at the level they choose  - Honor patient and family perspectives and choices  Outcome: Progressing     Problem: Diabetes/Glucose Control  Goal: Glucose maintained within prescribed range  Description: INTERVENTIONS:  - Monitor Blood Glucose as ordered  - Assess for signs and symptoms of hyperglycemia and hypoglycemia  - Administer ordered medications to maintain glucose within target range  - Assess barriers to adequate nutritional intake and initiate nutrition consult as needed  - Instruct patient on self management of diabetes  Outcome: Progressing     Problem: Patient/Family Goals  Goal: Patient/Family Long Term Goal  Description: Patient's Long Term Goal: Discharge    Interventions:  - Monitor vitals  - Monitor appropriate labs  - Administer medications as ordered  - Follow MD's orders  - Update patient on plan of care   - Discharge planning     - See additional Care Plan goals for specific interventions  Outcome: Progressing  Goal: Patient/Family Short Term Goal  Description: Patient's Short Term Goal:     Interventions:   -   - See additional Care Plan goals for specific interventions  Outcome: Progressing     Problem: MUSCULOSKELETAL - ADULT  Goal: Return mobility to safest level of function  Description: INTERVENTIONS:  - Assess patient stability and activity tolerance for standing, transferring and ambulating w/ or w/o assistive devices  - Assist with transfers and ambulation using safe patient handling equipment as  needed  - Ensure adequate protection for wounds/incisions during mobilization  - Obtain PT/OT consults as needed  - Advance activity as appropriate  - Communicate ordered activity level and limitations with patient/family  Outcome: Progressing     Problem: PAIN - ADULT  Goal: Verbalizes/displays adequate comfort level or patient's stated pain goal  Description: INTERVENTIONS:  - Encourage pt to monitor pain and request assistance  - Assess pain using appropriate pain scale  - Administer analgesics based on type and severity of pain and evaluate response  - Implement non-pharmacological measures as appropriate and evaluate response  - Consider cultural and social influences on pain and pain management  - Manage/alleviate anxiety  - Utilize distraction and/or relaxation techniques  - Monitor for opioid side effects  - Notify MD/LIP if interventions unsuccessful or patient reports new pain  - Anticipate increased pain with activity and pre-medicate as appropriate  Outcome: Progressing     Problem: RISK FOR INFECTION - ADULT  Goal: Absence of fever/infection during anticipated neutropenic period  Description: INTERVENTIONS  - Monitor WBC  - Administer growth factors as ordered  - Implement neutropenic guidelines  Outcome: Progressing     Problem: SAFETY ADULT - FALL  Goal: Free from fall injury  Description: INTERVENTIONS:  - Assess pt frequently for physical needs  - Identify cognitive and physical deficits and behaviors that affect risk of falls.  - [place] fall precautions as indicated by assessment.  - Educate pt/family on patient safety including physical limitations  - Instruct pt to call for assistance with activity based on assessment  - Modify environment to reduce risk of injury  - Provide assistive devices as appropriate  - Consider OT/PT consult to assist with strengthening/mobility  - Encourage toileting schedule  Outcome: Progressing     Problem: DISCHARGE PLANNING  Goal: Discharge to home or other  facility with appropriate resources  Description: INTERVENTIONS:  - Identify barriers to discharge w/pt and caregiver  - Include patient/family/discharge partner in discharge planning  - Arrange for needed discharge resources and transportation as appropriate  - Identify discharge learning needs (meds, wound care, etc)  - Arrange for interpreters to assist at discharge as needed  - Consider post-discharge preferences of patient/family/discharge partner  - Complete POLST form as appropriate  - Assess patient's ability to be responsible for managing their own health  - Refer to Case Management Department for coordinating discharge planning if the patient needs post-hospital services based on physician/LIP order or complex needs related to functional status, cognitive ability or social support system  Outcome: Progressing     Problem: METABOLIC/FLUID AND ELECTROLYTES - ADULT  Goal: Glucose maintained within prescribed range  Description: INTERVENTIONS:  - Monitor Blood Glucose as ordered  - Assess for signs and symptoms of hyperglycemia and hypoglycemia  - Administer ordered medications to maintain glucose within target range  - Assess barriers to adequate nutritional intake and initiate nutrition consult as needed  - Instruct patient on self management of diabetes  Outcome: Progressing  Goal: Electrolytes maintained within normal limits  Description: INTERVENTIONS:  - Monitor labs and rhythm and assess patient for signs and symptoms of electrolyte imbalances  - Administer electrolyte replacement as ordered  - Monitor response to electrolyte replacements, including rhythm and repeat lab results as appropriate  - Fluid restriction as ordered  - Instruct patient on fluid and nutrition restrictions as appropriate  Outcome: Progressing  Goal: Hemodynamic stability and optimal renal function maintained  Description: INTERVENTIONS:  - Monitor labs and assess for signs and symptoms of volume excess or deficit  - Monitor  intake, output and patient weight  - Monitor urine specific gravity, serum osmolarity and serum sodium as indicated or ordered  - Monitor response to interventions for patient's volume status, including labs, urine output, blood pressure (other measures as available)  - Encourage oral intake as appropriate  - Instruct patient on fluid and nutrition restrictions as appropriate  Outcome: Progressing

## 2024-12-18 LAB
ALBUMIN SERPL-MCNC: 2.3 G/DL (ref 3.2–4.8)
ANION GAP SERPL CALC-SCNC: 17 MMOL/L (ref 0–18)
ANION GAP SERPL CALC-SCNC: 17 MMOL/L (ref 0–18)
ANTIBODY SCREEN: NEGATIVE
BASOPHILS # BLD AUTO: 0.05 X10(3) UL (ref 0–0.2)
BASOPHILS NFR BLD AUTO: 0.5 %
BUN BLD-MCNC: 30 MG/DL (ref 9–23)
BUN BLD-MCNC: 30 MG/DL (ref 9–23)
BUN/CREAT SERPL: 14.4 (ref 10–20)
BUN/CREAT SERPL: 14.4 (ref 10–20)
CALCIUM BLD-MCNC: 6.7 MG/DL (ref 8.7–10.4)
CALCIUM BLD-MCNC: 6.7 MG/DL (ref 8.7–10.4)
CHLORIDE SERPL-SCNC: 109 MMOL/L (ref 98–112)
CHLORIDE SERPL-SCNC: 109 MMOL/L (ref 98–112)
CO2 SERPL-SCNC: 24 MMOL/L (ref 21–32)
CO2 SERPL-SCNC: 24 MMOL/L (ref 21–32)
CREAT BLD-MCNC: 2.09 MG/DL
CREAT BLD-MCNC: 2.09 MG/DL
DEPRECATED RDW RBC AUTO: 61.6 FL (ref 35.1–46.3)
EGFRCR SERPLBLD CKD-EPI 2021: 32 ML/MIN/1.73M2 (ref 60–?)
EGFRCR SERPLBLD CKD-EPI 2021: 32 ML/MIN/1.73M2 (ref 60–?)
EOSINOPHIL # BLD AUTO: 0.13 X10(3) UL (ref 0–0.7)
EOSINOPHIL NFR BLD AUTO: 1.4 %
ERYTHROCYTE [DISTWIDTH] IN BLOOD BY AUTOMATED COUNT: 17.4 % (ref 11–15)
GLUCOSE BLD-MCNC: 114 MG/DL (ref 70–99)
GLUCOSE BLD-MCNC: 114 MG/DL (ref 70–99)
GLUCOSE BLDC GLUCOMTR-MCNC: 157 MG/DL (ref 70–99)
GLUCOSE BLDC GLUCOMTR-MCNC: 178 MG/DL (ref 70–99)
GLUCOSE BLDC GLUCOMTR-MCNC: 218 MG/DL (ref 70–99)
GLUCOSE BLDC GLUCOMTR-MCNC: 246 MG/DL (ref 70–99)
GLUCOSE BLDC GLUCOMTR-MCNC: 295 MG/DL (ref 70–99)
HCT VFR BLD AUTO: 21.8 %
HGB BLD-MCNC: 10 G/DL
HGB BLD-MCNC: 6.6 G/DL
IMM GRANULOCYTES # BLD AUTO: 0.13 X10(3) UL (ref 0–1)
IMM GRANULOCYTES NFR BLD: 1.4 %
LYMPHOCYTES # BLD AUTO: 0.9 X10(3) UL (ref 1–4)
LYMPHOCYTES NFR BLD AUTO: 9.5 %
MCH RBC QN AUTO: 29.3 PG (ref 26–34)
MCHC RBC AUTO-ENTMCNC: 30.3 G/DL (ref 31–37)
MCV RBC AUTO: 96.9 FL
MONOCYTES # BLD AUTO: 0.75 X10(3) UL (ref 0.1–1)
MONOCYTES NFR BLD AUTO: 7.9 %
NEUTROPHILS # BLD AUTO: 7.52 X10 (3) UL (ref 1.5–7.7)
NEUTROPHILS # BLD AUTO: 7.52 X10(3) UL (ref 1.5–7.7)
NEUTROPHILS NFR BLD AUTO: 79.3 %
OSMOLALITY SERPL CALC.SUM OF ELEC: 317 MOSM/KG (ref 275–295)
OSMOLALITY SERPL CALC.SUM OF ELEC: 317 MOSM/KG (ref 275–295)
PHOSPHATE SERPL-MCNC: 1.3 MG/DL (ref 2.4–5.1)
PLATELET # BLD AUTO: 191 10(3)UL (ref 150–450)
POTASSIUM SERPL-SCNC: 2.8 MMOL/L (ref 3.5–5.1)
POTASSIUM SERPL-SCNC: 2.8 MMOL/L (ref 3.5–5.1)
RBC # BLD AUTO: 2.25 X10(6)UL
RH BLOOD TYPE: POSITIVE
SODIUM SERPL-SCNC: 150 MMOL/L (ref 136–145)
SODIUM SERPL-SCNC: 150 MMOL/L (ref 136–145)
WBC # BLD AUTO: 9.5 X10(3) UL (ref 4–11)

## 2024-12-18 PROCEDURE — 99232 SBSQ HOSP IP/OBS MODERATE 35: CPT | Performed by: ANESTHESIOLOGY

## 2024-12-18 PROCEDURE — 99233 SBSQ HOSP IP/OBS HIGH 50: CPT | Performed by: HOSPITALIST

## 2024-12-18 PROCEDURE — 99232 SBSQ HOSP IP/OBS MODERATE 35: CPT | Performed by: INTERNAL MEDICINE

## 2024-12-18 RX ORDER — LACTULOSE 10 G/15ML
45 SOLUTION ORAL ONCE
Status: COMPLETED | OUTPATIENT
Start: 2024-12-18 | End: 2024-12-18

## 2024-12-18 RX ORDER — NORTRIPTYLINE HYDROCHLORIDE 10 MG/1
10 CAPSULE ORAL NIGHTLY
Status: DISCONTINUED | OUTPATIENT
Start: 2024-12-18 | End: 2024-12-23

## 2024-12-18 RX ORDER — SODIUM CHLORIDE 9 MG/ML
INJECTION, SOLUTION INTRAVENOUS ONCE
Status: COMPLETED | OUTPATIENT
Start: 2024-12-18 | End: 2024-12-18

## 2024-12-18 RX ORDER — SIMETHICONE 125 MG
125 TABLET,CHEWABLE ORAL
Status: DISCONTINUED | OUTPATIENT
Start: 2024-12-18 | End: 2024-12-23

## 2024-12-18 RX ORDER — METOPROLOL TARTRATE 50 MG
100 TABLET ORAL
Status: DISCONTINUED | OUTPATIENT
Start: 2024-12-18 | End: 2024-12-21

## 2024-12-18 RX ORDER — BISACODYL 5 MG/1
10 TABLET, DELAYED RELEASE ORAL DAILY
Status: DISCONTINUED | OUTPATIENT
Start: 2024-12-18 | End: 2024-12-23

## 2024-12-18 RX ORDER — SIMETHICONE 125 MG
125 TABLET,CHEWABLE ORAL
Status: DISCONTINUED | OUTPATIENT
Start: 2024-12-18 | End: 2024-12-18

## 2024-12-18 RX ORDER — LACTULOSE 10 G/15ML
45 SOLUTION ORAL ONCE
Status: DISCONTINUED | OUTPATIENT
Start: 2024-12-18 | End: 2024-12-18

## 2024-12-18 RX ORDER — POTASSIUM CHLORIDE 1.5 G/1.58G
40 POWDER, FOR SOLUTION ORAL ONCE
Status: COMPLETED | OUTPATIENT
Start: 2024-12-18 | End: 2024-12-18

## 2024-12-18 NOTE — PLAN OF CARE
Problem: Patient Centered Care  Goal: Patient preferences are identified and integrated in the patient's plan of care  Description: Interventions:  - What would you like us to know as we care for you? I am from home with my wife  - Provide timely, complete, and accurate information to patient/family  - Incorporate patient and family knowledge, values, beliefs, and cultural backgrounds into the planning and delivery of care  - Encourage patient/family to participate in care and decision-making at the level they choose  - Honor patient and family perspectives and choices  Outcome: Progressing     Problem: Diabetes/Glucose Control  Goal: Glucose maintained within prescribed range  Description: INTERVENTIONS:  - Monitor Blood Glucose as ordered  - Assess for signs and symptoms of hyperglycemia and hypoglycemia  - Administer ordered medications to maintain glucose within target range  - Assess barriers to adequate nutritional intake and initiate nutrition consult as needed  - Instruct patient on self management of diabetes  Outcome: Progressing     Problem: Patient/Family Goals  Goal: Patient/Family Long Term Goal  Description: Patient's Long Term Goal: Discharge from the hospital    Interventions:  - Monitor vital signs  - Monitor appropriate labs  - Monitor blood glucose levels  - Pain management  - Administer medications per order  - Follow MD orders  - Diagnostics per order  - Update / inform patient and family on plan of care  - Discharge planning  - See additional Care Plan goals for specific interventions  Outcome: Progressing  Goal: Patient/Family Short Term Goal  Description: Patient's Short Term Goal: Improve bilateral feet pain    Interventions:   - Monitor vital signs  - Monitor appropriate labs  - Monitor blood glucose levels  - Pain management  - Administer medications per order  - Follow MD orders  - Diagnostics per order  - Update / inform patient and family on plan of care  - See additional Care Plan  goals for specific interventions  Outcome: Progressing     Problem: MUSCULOSKELETAL - ADULT  Goal: Return mobility to safest level of function  Description: INTERVENTIONS:  - Assess patient stability and activity tolerance for standing, transferring and ambulating w/ or w/o assistive devices  - Assist with transfers and ambulation using safe patient handling equipment as needed  - Ensure adequate protection for wounds/incisions during mobilization  - Obtain PT/OT consults as needed  - Advance activity as appropriate  - Communicate ordered activity level and limitations with patient/family  Outcome: Progressing     Problem: PAIN - ADULT  Goal: Verbalizes/displays adequate comfort level or patient's stated pain goal  Description: INTERVENTIONS:  - Encourage pt to monitor pain and request assistance  - Assess pain using appropriate pain scale  - Administer analgesics based on type and severity of pain and evaluate response  - Implement non-pharmacological measures as appropriate and evaluate response  - Consider cultural and social influences on pain and pain management  - Manage/alleviate anxiety  - Utilize distraction and/or relaxation techniques  - Monitor for opioid side effects  - Notify MD/LIP if interventions unsuccessful or patient reports new pain  - Anticipate increased pain with activity and pre-medicate as appropriate  Outcome: Progressing     Problem: RISK FOR INFECTION - ADULT  Goal: Absence of fever/infection during anticipated neutropenic period  Description: INTERVENTIONS  - Monitor WBC  - Administer growth factors as ordered  - Implement neutropenic guidelines  Outcome: Progressing     Problem: SAFETY ADULT - FALL  Goal: Free from fall injury  Description: INTERVENTIONS:  - Assess pt frequently for physical needs  - Identify cognitive and physical deficits and behaviors that affect risk of falls.  - [place] fall precautions as indicated by assessment.  - Educate pt/family on patient safety including  physical limitations  - Instruct pt to call for assistance with activity based on assessment  - Modify environment to reduce risk of injury  - Provide assistive devices as appropriate  - Consider OT/PT consult to assist with strengthening/mobility  - Encourage toileting schedule  Outcome: Progressing     Problem: DISCHARGE PLANNING  Goal: Discharge to home or other facility with appropriate resources  Description: INTERVENTIONS:  - Identify barriers to discharge w/pt and caregiver  - Include patient/family/discharge partner in discharge planning  - Arrange for needed discharge resources and transportation as appropriate  - Identify discharge learning needs (meds, wound care, etc)  - Arrange for interpreters to assist at discharge as needed  - Consider post-discharge preferences of patient/family/discharge partner  - Complete POLST form as appropriate  - Assess patient's ability to be responsible for managing their own health  - Refer to Case Management Department for coordinating discharge planning if the patient needs post-hospital services based on physician/LIP order or complex needs related to functional status, cognitive ability or social support system  Outcome: Progressing     Problem: METABOLIC/FLUID AND ELECTROLYTES - ADULT  Goal: Glucose maintained within prescribed range  Description: INTERVENTIONS:  - Monitor Blood Glucose as ordered  - Assess for signs and symptoms of hyperglycemia and hypoglycemia  - Administer ordered medications to maintain glucose within target range  - Assess barriers to adequate nutritional intake and initiate nutrition consult as needed  - Instruct patient on self management of diabetes  Outcome: Progressing  Goal: Electrolytes maintained within normal limits  Description: INTERVENTIONS:  - Monitor labs and rhythm and assess patient for signs and symptoms of electrolyte imbalances  - Administer electrolyte replacement as ordered  - Monitor response to electrolyte replacements,  including rhythm and repeat lab results as appropriate  - Fluid restriction as ordered  - Instruct patient on fluid and nutrition restrictions as appropriate  Outcome: Progressing  Goal: Hemodynamic stability and optimal renal function maintained  Description: INTERVENTIONS:  - Monitor labs and assess for signs and symptoms of volume excess or deficit  - Monitor intake, output and patient weight  - Monitor urine specific gravity, serum osmolarity and serum sodium as indicated or ordered  - Monitor response to interventions for patient's volume status, including labs, urine output, blood pressure (other measures as available)  - Encourage oral intake as appropriate  - Instruct patient on fluid and nutrition restrictions as appropriate  Outcome: Progressing   Safety precautions on place. Call light within reach. Blood transfusion completed.

## 2024-12-18 NOTE — PROGRESS NOTES
[facility]  part of [facility]    Progress Note    Ollie Hernández Patient Status:  Inpatient    1945 MRN [id number]   Location [facility] 5SW/SE Attending Dee Joyce,*   Hosp Day # 12 PCP Wili Parmar MD     Chief Complaint: feet hurt    Subjective:     Constitutional:  Positive for appetite change and fatigue.   Respiratory:  Positive for cough. Negative for choking.    Cardiovascular:  Negative for leg swelling.   Gastrointestinal:  Positive for abdominal pain, constipation and abdominal distention.   Genitourinary:  Negative for dysuria.   Musculoskeletal:  Positive for back pain.   Neurological:  Positive for weakness.   Psychiatric/Behavioral:  Negative for behavioral problems and decreased concentration. The patient is not hyperactive.        Objective:   Blood pressure 135/65, pulse 75, temperature 98.7 °F (37.1 °C), temperature source Oral, resp. rate 18, height 5' 4\" (1.626 m), weight 152 lb 12.5 oz (69.3 kg), SpO2 99%.  Physical Exam  Vitals and nursing note reviewed.   HENT:      Head: Normocephalic and atraumatic.   Cardiovascular:      Rate and Rhythm: Normal rate.   Pulmonary:      Breath sounds: Rhonchi present.   Skin:     General: Skin is warm and dry.      Capillary Refill: Capillary refill takes less than 2 seconds.   Neurological:      General: No focal deficit present.      Mental Status: Mental status is at baseline.   Psychiatric:         Behavior: Behavior normal.         Results:   Lab Results   Component Value Date    WBC 9.5 2024    HGB 6.6 (LL) 2024    HCT 21.8 (L) 2024    .0 2024    CREATSERUM 2.09 (H) 2024    CREATSERUM 2.09 (H) 2024    BUN 30 (H) 2024    BUN 30 (H) 2024     (H) 2024     (H) 2024    K 2.8 (LL) 2024    K 2.8 (LL) 2024     2024     2024    CO2 24.0 2024    CO2 24.0 2024     (H)  12/18/2024     (H) 12/18/2024    CA 6.7 (L) 12/18/2024    CA 6.7 (L) 12/18/2024    ALB 2.3 (L) 12/18/2024    ALKPHO 116 12/07/2024    BILT 0.3 12/07/2024    TP 5.7 12/08/2024    AST 32 12/07/2024    ALT 29 12/07/2024    PTT 30.1 08/09/2024    INR 1.45 (H) 08/09/2024    T4F 0.9 08/31/2022    TSH 2.844 07/04/2024     (H) 07/30/2024    PSA 2.94 10/20/2021    DDIMER 6.07 (H) 09/29/2024    ESRML 10 06/16/2024    CRP 1.30 (H) 06/16/2024    MG 2.2 12/11/2024    PHOS 1.3 (L) 12/18/2024    TROP <0.045 07/25/2019    TROPHS 11 12/16/2024     08/05/2023    B12 672 07/04/2024       XR PAIN CLINIC FLUOROSCOPY - N/C    Result Date: 12/18/2024  CONCLUSION: Intraoperative fluoroscopy provided.  Please see surgical note for specific details.     Dictated by (CST): Kevin Hensley MD on 12/18/2024 at 7:36 AM     Finalized by (CST): Kevin Hensley MD on 12/18/2024 at 7:37 AM               Assessment & Plan:     Peripheral polyneuropathy; checked rpr ok; thyroid; electropheresis; b12 ok 7/24  -Patient p/w bilateral lower extremity pain  -Significantly worse over the past 3 to 4 days.  -No real improvement with gabapentin at home  -Difficulties with ambulation because of pain  -Pain control as able; pt screaming in pain or asleep; pain clinic saw; await mri  poss steroid   -PT/OT  -Continue to monitor  --increased confusion and hypoxia on p.o. narcotics, will discontinue all narcotics at this time, if pain ensues consider PCA for better autoregulation.  Has not needed them so far.  - WILLIAM on 12/17/2024 which is when he would be 7 days away from Plavix.  Appreciate pain service; ok to restart palvix     Acute respiratory failure, confusion/hypoxia: Significant event 12/14/2020/probable aspiration pneumonia  Possibly secondary to narcotics although concern for aspiration versus pneumonia  Chest x-ray consistent with some pulmonary edema and questionable underlying pneumonia  CT head no acute findings  ABG reviewed  no CO2 narcosis  IV Unasyn initiated  O2 support as needed, wean as able  Continue to monitor     ESRD on HD  -Normally receives dialysis on Monday Wednesday Friday.  .  -Nephrology consulted, further HD per nephrology.had dialysis 12/18     Anemia of chronic renal disease and iron defy   -checked stool for blood, neg;  -Likely secondary to ESRD as above  -Hemoglobin noted to be low transfused again 12/18  -Continue to monitor      Afib :   has watchchad romano put in in 9/2024; mri ok with bibman per dr coffey  12/13: Patient had episode of asymptomatic transient RVR self-limited.  12/16: Again RVR during dialysis, reach out to cardiology for any further medication adjustments.    Beta-blocker increased, currently rate controlled appreciate cardiology recommendations        Type 2 DM:  - Monitoring Blood glucose with POC checks  - SSI to cover hyperglycemia  - Hypoglycemia protocol  - Will monitor and adjust agents as needed.  -Patient has appoint with Dr. Sevilla on 12/17/2024.  If blood sugars increase after steroid injection will reach out to endocrinology if not patient needs to reschedule since there is no urgent reason for consult at this time.             -KRAIG  -Hypertension  -Chronic dCHF  -Pulm HTN    Adb bloating and constip from narcs           Patient will require inpatient services that will reasonably be expected to span two midnight's based on the clinical documentation in H+P.   Based on patients current state of illness, I anticipate that, after discharge, patient will require jefferson    Complex mdm; coordinating care with nurse and counseling pt about pain      BRITTANY WAY MD

## 2024-12-18 NOTE — CHRONIC PAIN
[facility]  Inpatient Pain Management Progress Note      Patient name: Ollie Hernández 79 year old male  : 1945  MRN: [id number]    Diagnosis:   1. Peripheral polyneuropathy    2. ESRD on hemodialysis (Formerly [facility])        Reason for Consult: LE pain    Current hospital day: Hospital Day: 13    Pain Scores:  6-7    Subjective:   Patient s/p LESI states not sure if pain better as he is still in bed  Looks much more comfortable  this am    Pain Meds   Acetaminophen [id number]  Gabapentin (stopped)  Nortriptyline (10mg nightly)  Lidocaine patch  Norco (stopped due to AMS)  Ultram 50 q 12 prn stopped    History:  Past Medical History:    Anemia    Asthma (Formerly [facility])    Back problem    BPH (benign prostatic hyperplasia)    Calculus of kidney    Cataract    Coronary atherosclerosis    Diabetes (Formerly [facility])    ESRD (end stage renal disease) on dialysis (Formerly [facility])    Essential hypertension    High blood pressure    High cholesterol    History of blood transfusion    Hyperlipidemia    Neuropathy    hands and feet    KRAIG on CPAP    Renal disorder    Sleep apnea    Visual impairment    glasses    Vocal cord paralysis, unilateral partial     Past Surgical History:   Procedure Laterality Date    Appendectomy          Back surgery      Neck/back -     Capsule endoscopy - internal referral      Cataract extraction Right 2022    PHACOEMULSIFICATION WITH POSTERIOR CHAMBER INTRAOCULAR LENS, RIGHT EYE    Cataract extraction Left 2021    PHACOEMULSIFICATION WITH POSTERIOR CHAMBER INTRAOCULAR LENS, LEFT EYE    Cath bare metal stent (bms)      Cath drug eluting stent  2024    Successful IVUS guided PCI of the circumflex with a ABIMBOLA    Colonoscopy      Colonoscopy N/A 2021    Procedure: COLONOSCOPY;  Surgeon: Michael Gautam MD;  Location: [facility] ENDO    Colonoscopy N/A 2024    Procedure: COLONOSCOPY;  Surgeon: Gabriel Saldana MD;  Location: [facility] ENDOSCOPY    Colonoscopy N/A 06/15/2024    Procedure:  COLONOSCOPY;  Surgeon: Carlyn Storey MD;  Location: [facility] ENDOSCOPY    Colonoscopy N/A 07/31/2024    Procedure: COLONOSCOPY;  Surgeon: Gabriel Saldana MD;  Location: [facility] ENDOSCOPY    Dialysis procedure  02/17/2023    right internal jugular tunneled dialysis catheter placement.    Hand/finger surgery unlisted      Accidental trauma    Spine surgery procedure unlisted      Total hip arthroplasty Left 10/04/2024    Left anterior total hip arthroplasty    Upper gi endoscopy,diagnosis       Family History   Problem Relation Age of Onset    Cancer Father         Kidney    Breast Cancer Mother     Diabetes Mother     Diabetes Maternal Grandmother     Diabetes Maternal Grandfather     Heart Disorder Other         Uncle      reports that he quit smoking about 43 years ago. His smoking use included cigarettes. He started smoking about 61 years ago. He has a 17 pack-year smoking history. He has never used smokeless tobacco. He reports that he does not drink alcohol and does not use drugs.    Allergies:  Allergies[1]    Current Medications:  Scheduled Meds:   sodium chloride   Intravenous Once    clopidogrel  75 mg Per G Tube Daily    metoprolol  5 mg Intravenous Once    metoprolol tartrate  50 mg Oral 2x Daily(Beta Blocker)    acetaminophen  500 mg Oral q6h    ampicillin-sulbactam  1.5 g Intravenous q12h    insulin regular human  1-7 Units Subcutaneous 4 times per day    [Held by provider] gabapentin  300 mg Per G Tube BID    aspirin  81 mg Per G Tube Daily    lidocaine-menthol  2 patch Transdermal Daily    epoetin donna  10,000 Units Subcutaneous Once per day on Monday Wednesday Friday    nortriptyline  10 mg Oral Nightly    amiodarone  200 mg Per G Tube Daily    atorvastatin  40 mg Per G Tube Nightly    fluticasone propionate  2 spray Nasal Daily    insulin degludec  5 Units Subcutaneous Nightly    lansoprazole  30 mg Per G Tube QAM AC    heparin  5,000 Units Subcutaneous Q8H STEPHANIE     Continuous Infusions:   dextrose 10%        PRN Meds:.  [START ON 12/19/2024] sodium chloride **AND** [START ON 12/19/2024] albumin human    sodium chloride **AND** albumin human    traMADol    ipratropium-albuterol    [Held by provider] LORazepam    heparin    lidocaine-prilocaine    dextrose 10%    lipase-protease-amylase (Lip-Prot-Amyl) **AND** sodium bicarbonate    metoprolol **OR** metoprolol    [Held by provider] HYDROmorphone **OR** [Held by provider] HYDROmorphone **OR** [Held by provider] HYDROmorphone    glucose **OR** glucose **OR** glucose-vitamin C **OR** dextrose **OR** glucose **OR** glucose **OR** glucose-vitamin C    acetaminophen **OR** [DISCONTINUED] HYDROcodone-acetaminophen **OR** [DISCONTINUED] HYDROcodone-acetaminophen    polyethylene glycol (PEG 3350)    sennosides    bisacodyl    Exam:Blood pressure 147/55, pulse 76, temperature 98.6 °F (37 °C), temperature source Oral, resp. rate 18, height 5' 4\" (1.626 m), weight 152 lb 12.5 oz (69.3 kg), SpO2 97%.    Imaging:   Lumbar MRI  CONCLUSION:      1. Multilevel degenerative changes of the lumbar spine, which are described in detail above and are similar when compared to 02/07/2023.   2. At L4-L5, there is severe canal stenosis, severe bilateral subarticular zone stenosis, and moderate to severe bilateral foraminal narrowing.   3. Redemonstrated postoperative changes from prior decompressive laminectomies at L2-L4.   4. Grade 1 anterolisthesis of L4 on L5.   5. No acute fracture or traumatic listhesis of the lumbar spine.   6. Multifocal abnormal increased T2/STIR signal involving the paraspinal musculature, which may reflect atrophy, denervation, or less likely reflect myositis.   7. Circumferential bladder wall thickening, which may be secondary to underdistention, cystitis, or chronic outlet obstruction.   8. Lesser incidental findings described above.          Assessment:  79 year old male with past medical history of T2DM, HTN, HLD, ESRD on HD MWF via R AVF, CAD s/p PCI (5/2024),  ALONDRA.fib on AC, HFpEF, RKAIG, BPH, history of recurrent gastrointestinal bleeding, who presented with bilateral lower extremity pain.    Pain management was consulted for bilateral lower extremities pain in the setting of peripheral neuropathy. MRI was also done showing severe canal stenosis at L4-5. Patient pain is likely multifactorial from both diabetic neuropathy and spinal stenosis. History is more indicative of peripheral neuropathy pain.     Currently patient states his pain is moderately controlled on his current regimen.  He states the ultram helps his pain however it does make him tired.  He is on low-dose gabapentin given his renal failure.  He is also on nortriptyline 10 mg nightly.    Pain Meds   Acetaminophen [id number]  Gabapentin (stopped)  Nortriptyline (10mg nightly)  Lidocaine patch  Norco (stopped due to AMS)  ultram    Plan:  - Can restart Plavix   - Severe confusion resolved .   -Continue lidocaine patch and nortiptyline   - Pain controlled today on evaluation. -Tylenol as needed  - LESI yesterday peak effect @ 48 hrs  - nothing further to offer will sign off    Total Time: 18 minutes     Lengthy discussion of risks, benefits, and alternative treatments were reviewed to patients satisfaction. All questions were answered. Patient agreeable to plan. Greater than 50% of the time was spent with counseling (nature of discussion centered around pain, therapy, and treatment options), face to face time, time spent reviewing data, obtaining patient information and discussing the care with the patients health care providers.     Chronic Pain Service [phone]         [1]   Allergies  Allergen Reactions    Adhesive Tape OTHER (SEE COMMENTS)     Severe rashes    Dust Mite Extract RASH

## 2024-12-18 NOTE — OCCUPATIONAL THERAPY NOTE
OCCUPATIONAL THERAPY TREATMENT NOTE - INPATIENT        Room Number: 553/553-A     Presenting Problem: peripheral polyneuropathy    Problem List  Principal Problem:    Peripheral polyneuropathy  Active Problems:    ESRD on hemodialysis (HCC)    Hypophosphatemia      OCCUPATIONAL THERAPY ASSESSMENT   Patient demonstrates limited progress this session, goals remain in progress.    Patient is requiring  up to max assist x2  as a result of the following impairments: decreased functional strength, decreased endurance, pain, impaired standing balance, decreased muscular endurance, and medical status.    Patient continues to function below baseline with toileting, lower body dressing, bed mobility, transfers, static standing balance, dynamic standing balance, and functional standing tolerance.  Next session anticipate patient to progress lower body dressing, bed mobility, transfers, and functional standing tolerance.  Occupational Therapy will continue to follow patient for duration of hospitalization.    Patient continues to benefit from continued skilled OT services: to promote return to prior level of function and safety with continuous assistance and gradual rehabilitative therapy.     PLAN DURING HOSPITALIZATION  OT Device Recommendations: TBD  OT Treatment Plan: Energy conservation/work simplification techniques;Balance activities;ADL training;Functional transfer training;Patient/Family education;Patient/Family training;Compensatory technique education     SUBJECTIVE  I would like to try and stand.     OBJECTIVE  Precautions: Limb alert - right;Bed/chair alarm       PAIN ASSESSMENT  Rating: Unable to rate  Location: generalized body aches  Management Techniques: Activity promotion; Relaxation; Repositioning    ACTIVITY TOLERANCE  Pulse: 76  Heart Rate Source: Monitor     BP: 145/69  BP Location: Left arm  BP Method: Automatic  Patient Position: Sitting    O2 SATURATIONS  Oxygen Therapy  SPO2% Ambulation on Oxygen: 97  (after 2 sit to stands, NOT ambulation)  Ambulation oxygen flow (liters per minute): 3    ACTIVITIES OF DAILY LIVING ASSESSMENT  AM-PAC ‘6-Clicks’ Inpatient Daily Activity Short Form  How much help from another person does the patient currently need…  -   Putting on and taking off regular lower body clothing?: A Lot  -   Bathing (including washing, rinsing, drying)?: A Lot  -   Toileting, which includes using toilet, bedpan or urinal? : A Lot  -   Putting on and taking off regular upper body clothing?: A Little  -   Taking care of personal grooming such as brushing teeth?: A Little  -   Eating meals?: A Little    AM-PAC Score:  Score: 15  Approx Degree of Impairment: 56.46%  Standardized Score (AM-PAC Scale): 34.69  CMS Modifier (G-Code): CK    FUNCTIONAL TRANSFER ASSESSMENT  Sit to Stand: Chair  Edge of Bed: Maximum Assist (x2)    BALANCE ASSESSMENT  Static Sitting: Stand-by Assist  Static Standing: Maximum Assist      Skilled Therapy Provided:   Discussed pt with RN. Pt nearing end of blood transfusion, but RN cleared pt for light therapy participation. Session coordinated with PT. Vitals monitored and reported above. Pt received sitting up in recliner with no visitors present. Pt agreeable to participation in therapy. Pt benefited from cues for body mechanics and RW for support. Pt required max assist x2 to complete 2 sit to stands from recliner in prep for functional transfers. Pt only able to tolerate ~15 seconds with feet sliding forward. On second stand pt benefited from BLE foot block. Pt able to tolerate ~ 1 min during second stand with max assist to maintain and complete marches in place. Pt demonstrated improved ability to self-correct posture and BLE positioning w/ cues. Pt returned to sitting up in recliner with all needs in reach and RN aware of session.       EDUCATION PROVIDED  Patient Education : Role of Occupational Therapy; Plan of Care; Functional Transfer Techniques; Fall Prevention;  Posture/Positioning; Proper Body Mechanics  Patient's Response to Education: Verbalized Understanding; Returned Demonstration  Family/Caregiver Education : Role of Occupational Therapy; Plan of Care  Family/Caregiver's Response to Education: Verbalized Understanding    The patient's Approx Degree of Impairment: 56.46% has been calculated based on documentation in the [facility] '6 clicks' Inpatient Daily Activity Short Form.  Research supports that patients with this level of impairment may benefit from rehab.  Final disposition will be made by interdisciplinary medical team.    Patient End of Session: Up in chair;Needs met;Call light within reach;RN aware of session/findings;All patient questions and concerns addressed;Hospital anti-slip socks    OT Goals:  Patients self stated goal is: unstated     Patient will complete functional transfer with min A  Comment: ongoing- max assist     Patient will complete toileting with min A  Comment: ongoing- max     Patient will tolerate standing for 3 minutes in prep for adls with min A   Comment: ongoing     Patient will complete item retrieval with min A  Comment: not tested           Goals  on: 24  Frequency: 3-5x/week  OT Session Time: 8 minutes  Self-Care Home Management: 8 minutes

## 2024-12-18 NOTE — PLAN OF CARE
Problem: Patient Centered Care  Goal: Patient preferences are identified and integrated in the patient's plan of care  Description: Interventions:  - What would you like us to know as we care for you? I am from home with my wife  - Provide timely, complete, and accurate information to patient/family  - Incorporate patient and family knowledge, values, beliefs, and cultural backgrounds into the planning and delivery of care  - Encourage patient/family to participate in care and decision-making at the level they choose  - Honor patient and family perspectives and choices  Outcome: Progressing     Problem: Diabetes/Glucose Control  Goal: Glucose maintained within prescribed range  Description: INTERVENTIONS:  - Monitor Blood Glucose as ordered  - Assess for signs and symptoms of hyperglycemia and hypoglycemia  - Administer ordered medications to maintain glucose within target range  - Assess barriers to adequate nutritional intake and initiate nutrition consult as needed  - Instruct patient on self management of diabetes  Outcome: Progressing     Problem: Patient/Family Goals  Goal: Patient/Family Long Term Goal  Description: Patient's Long Term Goal: Discharge from the hospital    Interventions:  - Monitor vital signs  - Monitor appropriate labs  - Monitor blood glucose levels  - Pain management  - Administer medications per order  - Follow MD orders  - Diagnostics per order  - Update / inform patient and family on plan of care  - Discharge planning  - See additional Care Plan goals for specific interventions  Outcome: Progressing  Goal: Patient/Family Short Term Goal  Description: Patient's Short Term Goal: Improve bilateral feet pain    Interventions:   - Monitor vital signs  - Monitor appropriate labs  - Monitor blood glucose levels  - Pain management  - Administer medications per order  - Follow MD orders  - Diagnostics per order  - Update / inform patient and family on plan of care  - See additional Care Plan  goals for specific interventions  Outcome: Progressing     Problem: MUSCULOSKELETAL - ADULT  Goal: Return mobility to safest level of function  Description: INTERVENTIONS:  - Assess patient stability and activity tolerance for standing, transferring and ambulating w/ or w/o assistive devices  - Assist with transfers and ambulation using safe patient handling equipment as needed  - Ensure adequate protection for wounds/incisions during mobilization  - Obtain PT/OT consults as needed  - Advance activity as appropriate  - Communicate ordered activity level and limitations with patient/family  Outcome: Progressing     Problem: PAIN - ADULT  Goal: Verbalizes/displays adequate comfort level or patient's stated pain goal  Description: INTERVENTIONS:  - Encourage pt to monitor pain and request assistance  - Assess pain using appropriate pain scale  - Administer analgesics based on type and severity of pain and evaluate response  - Implement non-pharmacological measures as appropriate and evaluate response  - Consider cultural and social influences on pain and pain management  - Manage/alleviate anxiety  - Utilize distraction and/or relaxation techniques  - Monitor for opioid side effects  - Notify MD/LIP if interventions unsuccessful or patient reports new pain  - Anticipate increased pain with activity and pre-medicate as appropriate  Outcome: Progressing     Problem: RISK FOR INFECTION - ADULT  Goal: Absence of fever/infection during anticipated neutropenic period  Description: INTERVENTIONS  - Monitor WBC  - Administer growth factors as ordered  - Implement neutropenic guidelines  Outcome: Progressing     Problem: SAFETY ADULT - FALL  Goal: Free from fall injury  Description: INTERVENTIONS:  - Assess pt frequently for physical needs  - Identify cognitive and physical deficits and behaviors that affect risk of falls.  - [place] fall precautions as indicated by assessment.  - Educate pt/family on patient safety including  physical limitations  - Instruct pt to call for assistance with activity based on assessment  - Modify environment to reduce risk of injury  - Provide assistive devices as appropriate  - Consider OT/PT consult to assist with strengthening/mobility  - Encourage toileting schedule  Outcome: Progressing     Problem: DISCHARGE PLANNING  Goal: Discharge to home or other facility with appropriate resources  Description: INTERVENTIONS:  - Identify barriers to discharge w/pt and caregiver  - Include patient/family/discharge partner in discharge planning  - Arrange for needed discharge resources and transportation as appropriate  - Identify discharge learning needs (meds, wound care, etc)  - Arrange for interpreters to assist at discharge as needed  - Consider post-discharge preferences of patient/family/discharge partner  - Complete POLST form as appropriate  - Assess patient's ability to be responsible for managing their own health  - Refer to Case Management Department for coordinating discharge planning if the patient needs post-hospital services based on physician/LIP order or complex needs related to functional status, cognitive ability or social support system  Outcome: Progressing     Problem: METABOLIC/FLUID AND ELECTROLYTES - ADULT  Goal: Glucose maintained within prescribed range  Description: INTERVENTIONS:  - Monitor Blood Glucose as ordered  - Assess for signs and symptoms of hyperglycemia and hypoglycemia  - Administer ordered medications to maintain glucose within target range  - Assess barriers to adequate nutritional intake and initiate nutrition consult as needed  - Instruct patient on self management of diabetes  Outcome: Progressing  Goal: Electrolytes maintained within normal limits  Description: INTERVENTIONS:  - Monitor labs and rhythm and assess patient for signs and symptoms of electrolyte imbalances  - Administer electrolyte replacement as ordered  - Monitor response to electrolyte replacements,  including rhythm and repeat lab results as appropriate  - Fluid restriction as ordered  - Instruct patient on fluid and nutrition restrictions as appropriate  Outcome: Progressing  Goal: Hemodynamic stability and optimal renal function maintained  Description: INTERVENTIONS:  - Monitor labs and assess for signs and symptoms of volume excess or deficit  - Monitor intake, output and patient weight  - Monitor urine specific gravity, serum osmolarity and serum sodium as indicated or ordered  - Monitor response to interventions for patient's volume status, including labs, urine output, blood pressure (other measures as available)  - Encourage oral intake as appropriate  - Instruct patient on fluid and nutrition restrictions as appropriate  Outcome: Progressing      Monitoring vital signs- see flowsheets. Monitoring blood glucose levels. Pain medication provided as scheduled. No acute changes noted at this moment. Safety and fall precautions maintained- bed alarm on, bed locked in lowest position, call light within reach. Frequent rounding by nursing staff.

## 2024-12-18 NOTE — PROGRESS NOTES
Cardiology Progress Note  [facility]    Ollie Hernández Patient Status:  Inpatient    1945 MRN [id number]   Location [facility] 5SW/SE Attending Lizbeth Rey MD   Hosp Day # 12 PCP Wili Parmar MD     Primary Cardiologist: Dr. Phelan    Reason for Consultation:  Afib    Subjective:  Doing well. No CV complaints. Had dialysis without issues. Had episode of afib with RVR with rates in the 140's around 10pm last night.    Assessment/Plan:  Afib s/p Watchman 24 (24mm Watchman FLX )  CAD s/p PCI LCX with Ramin [place] ABIMBOLA x 1 (24)   HFpEF  ESRD on iHD  T2DM  KRAIG  HTN  Polyneuropathy, needing MRI    - plavix being held, continue ASA. Okay to restart DAPT when able.  - previously had hypotension so metoprolol dose was dropped. May be why rates are worse. BP is in the 140's now, so back to increased dose. Tolerated dialysis well.  - increase metoprolol to 100mg BID. HR okay.  - IV 5mg prn if needed during dialysis.   - TTE reviewed. RVSP 62 mmHg.   - RVSP 62 mmHg and right pleural effusion- recommend further dialysis and challenge dry weight.  - Tele  - Will continue to follow.    History:  Past Medical History:    Anemia    Asthma (HCC)    Back problem    BPH (benign prostatic hyperplasia)    Calculus of kidney    Cataract    Coronary atherosclerosis    Diabetes (HCC)    ESRD (end stage renal disease) on dialysis (HCC)    Essential hypertension    High blood pressure    High cholesterol    History of blood transfusion    Hyperlipidemia    Neuropathy    hands and feet    KRAIG on CPAP    Renal disorder    Sleep apnea    Visual impairment    glasses    Vocal cord paralysis, unilateral partial     Past Surgical History:   Procedure Laterality Date    Appendectomy          Back surgery      Neck/back -     Capsule endoscopy - internal referral      Cataract extraction Right 2022    PHACOEMULSIFICATION WITH POSTERIOR CHAMBER INTRAOCULAR LENS, RIGHT EYE    Cataract extraction  Left 12/21/2021    PHACOEMULSIFICATION WITH POSTERIOR CHAMBER INTRAOCULAR LENS, LEFT EYE    Cath bare metal stent (bms)      Cath drug eluting stent  05/21/2024    Successful IVUS guided PCI of the circumflex with a ABIMBOLA    Colonoscopy      Colonoscopy N/A 01/25/2021    Procedure: COLONOSCOPY;  Surgeon: Michael Gautam MD;  Location: [facility] ENDO    Colonoscopy N/A 06/03/2024    Procedure: COLONOSCOPY;  Surgeon: Gabriel Saldana MD;  Location: [facility] ENDOSCOPY    Colonoscopy N/A 06/15/2024    Procedure: COLONOSCOPY;  Surgeon: Carlyn Storey MD;  Location: [facility] ENDOSCOPY    Colonoscopy N/A 07/31/2024    Procedure: COLONOSCOPY;  Surgeon: Gabriel Saldana MD;  Location: [facility] ENDOSCOPY    Dialysis procedure  02/17/2023    right internal jugular tunneled dialysis catheter placement.    Hand/finger surgery unlisted      Accidental trauma    Spine surgery procedure unlisted      Total hip arthroplasty Left 10/04/2024    Left anterior total hip arthroplasty    Upper gi endoscopy,diagnosis       Family History   Problem Relation Age of Onset    Cancer Father         Kidney    Breast Cancer Mother     Diabetes Mother     Diabetes Maternal Grandmother     Diabetes Maternal Grandfather     Heart Disorder Other         Uncle      reports that he quit smoking about 43 years ago. His smoking use included cigarettes. He started smoking about 61 years ago. He has a 17 pack-year smoking history. He has never used smokeless tobacco. He reports that he does not drink alcohol and does not use drugs.    Allergies:  Allergies[1]    Medications:  Medications Ordered Prior to Encounter[2]    Review of Systems:  As above, otherwise negative.      Physical Exam:  Blood pressure 133/66, pulse 76, temperature 98.7 °F (37.1 °C), temperature source Oral, resp. rate 18, height 5' 4\" (1.626 m), weight 152 lb 12.5 oz (69.3 kg), SpO2 99%.  Wt Readings from Last 3 Encounters:   12/18/24 152 lb 12.5 oz (69.3 kg)   11/30/24 145 lb (65.8 kg)   10/22/24 132 lb  14.4 oz (60.3 kg)       General: awake, alert, oriented x 3, no acute distress  HEENT: at/nc, perrl, eomi  Neck: No JVD, carotids 2+ no bruits.  Cardiac: Regular rate and rhythm, S1, S2 normal, no murmur, rub or gallop.  Lungs: Clear without wheezes, rales, rhonchi or dullness.  Normal excursions and effort.  Abdomen: Soft, non-tender, non-distended, normal bowel sounds   Extremities: Without clubbing, cyanosis or edema.  Peripheral pulses are 2+.  Neurologic: Alert and oriented, normal affect.  Psych: normal mood and affect  Skin: Warm and dry.       Laboratories and Data:  Diagnostics:      Labs:   CBC:    Lab Results   Component Value Date    WBC 9.5 12/18/2024    WBC 9.3 12/17/2024    WBC 8.5 12/16/2024     Lab Results   Component Value Date    HGB 6.6 (LL) 12/18/2024    HGB 8.9 (L) 12/17/2024    HGB 7.6 (L) 12/16/2024      Lab Results   Component Value Date    .0 12/18/2024    .0 12/17/2024    .0 12/16/2024     BMP:   No results found for: \"GLUCOSE\"  Lab Results   Component Value Date    K 2.8 (LL) 12/18/2024    K 2.8 (LL) 12/18/2024    K 4.9 12/17/2024    K 4.9 12/17/2024     Lab Results   Component Value Date    BUN 30 (H) 12/18/2024    BUN 30 (H) 12/18/2024    BUN 55 (H) 12/17/2024    BUN 55 (H) 12/17/2024     Lab Results   Component Value Date    CREATSERUM 2.09 (H) 12/18/2024    CREATSERUM 2.09 (H) 12/18/2024    CREATSERUM 3.74 (H) 12/17/2024    CREATSERUM 3.74 (H) 12/17/2024     Cholesterol:     Lab Results   Component Value Date    CHOLEST 179 09/29/2024    CHOLEST 146 07/31/2024    CHOLEST 153 07/04/2024     Lab Results   Component Value Date    HDL 41 09/29/2024    HDL 29 (L) 07/31/2024    HDL 41 07/04/2024     Lab Results   Component Value Date    TRIG 259 (H) 10/17/2024    TRIG 160 (H) 10/04/2024    TRIG 206 (H) 10/02/2024     Lab Results   Component Value Date     (H) 09/29/2024    LDL 93 07/31/2024     (H) 07/04/2024     Lab Results   Component Value Date    AST  32 2024    AST 42 (H) 2024    AST 71 (H) 2024     Lab Results   Component Value Date    ALT 29 2024    ALT 38 2024    ALT 57 (H) 2024           Luis Orozco MD  Interventional Cardiology         [1]   Allergies  Allergen Reactions    Adhesive Tape OTHER (SEE COMMENTS)     Severe rashes    Dust Mite Extract RASH   [2]   Current Facility-Administered Medications on File Prior to Encounter   Medication Dose Route Frequency Provider Last Rate Last Admin    [COMPLETED] acetaminophen (Tylenol) 160 MG/5ML oral liquid 500 mg  500 mg Per G Tube Once Gabriela Allen MD   500 mg at 24 0209    [COMPLETED] gabapentin (Neurontin) 250 MG/5ML oral solution 300 mg  300 mg Per G Tube Once Gabriela Allen MD   300 mg at 24 0440    [COMPLETED] ceFAZolin (Ancef) 2g in 10mL IV syringe premix  2 g Intravenous Once Yash Sheppard MD   2 g at 10/19/24 1036    [] adult 3 in 1 TPN   Intravenous Continuous TPN Pranay Michel MD   Paused at 10/19/24 0250    [COMPLETED] sodium chloride 0.9 % IV bolus 250 mL  250 mL Intravenous Once Lorelei Mata  mL/hr at 10/17/24 0023 250 mL at 10/17/24 0023    [] adult 3 in 1 TPN   Intravenous Continuous TPN Pranay Michel MD 50 mL/hr at 10/17/24 2233 New Bag at 10/17/24 2233    [COMPLETED] dilTIAZem (cardIZEM) 25 mg/5mL injection 10 mg  10 mg Intravenous Once Pranay Michel MD   10 mg at 10/17/24 1330    [COMPLETED] sodium chloride 0.9 % IV bolus 250 mL  250 mL Intravenous Once Pranay Michel  mL/hr at 10/17/24 1700 250 mL at 10/17/24 1700    [COMPLETED] amiodarone (Cordarone) 150 mg in dextrose 5% 100 mL IV bolus  150 mg Intravenous Once Emerson Julio  mL/hr at 10/17/24 1805 150 mg at 10/17/24 1805    [COMPLETED] digoxin (Lanoxin) 250 MCG/ML injection 250 mcg  250 mcg Intravenous Once Herman Phelan MD   250 mcg at 10/17/24 1750    [] sodium chloride 0.9 % IV bolus 100 mL  100 mL Intravenous Q30 Min PRN  José Callahan MD        And    [] albumin human (Albumin) 25% injection 25 g  25 g Intravenous PRN Dialysis José Callahan MD        [] dextrose 5%-sodium chloride 0.45% infusion   Intravenous Continuous Lorelei Mata MD 50 mL/hr at 10/16/24 0038 New Bag at 10/16/24 0038    [] adult 3 in 1 TPN   Intravenous Continuous TPN Pranay Michel MD 50 mL/hr at 10/16/24 2118 New Bag at 10/16/24 2118    [COMPLETED] metoprolol (Lopressor) 5 mg/5mL injection 5 mg  5 mg Intravenous Once Herman Phelan MD   5 mg at 10/16/24 2307    [] sodium chloride 0.9 % IV bolus 100 mL  100 mL Intravenous Q30 Min PRN José Callahan MD        And    [] albumin human (Albumin) 25% injection 25 g  25 g Intravenous PRN Dialysis José Callahan MD        [] sodium chloride 0.9 % IV bolus 100 mL  100 mL Intravenous Q30 Min PRN Walker Klein MD        And    [] albumin human (Albumin) 25% injection 25 g  25 g Intravenous PRN Dialysis Walker Klein MD        [COMPLETED] metoprolol (Lopressor) 5 mg/5mL injection 5 mg  5 mg Intravenous Once Radha Dunham MD   5 mg at 10/12/24 0304    [COMPLETED] methylPREDNISolone sodium succinate (Solu-MEDROL) injection 40 mg  40 mg Intravenous BID Jayjay Mijares MD   40 mg at 10/13/24 0919    [] sodium chloride 0.9 % IV bolus 100 mL  100 mL Intravenous Q30 Min PRN Walker Klein MD        And    [] albumin human (Albumin) 25% injection 25 g  25 g Intravenous PRN Dialysis Walker Klein MD   25 g at 10/11/24 1900    [COMPLETED] methylPREDNISolone sodium succinate (Solu-MEDROL) injection 60 mg  60 mg Intravenous Once Jimmie Landeros MD   60 mg at 10/09/24 0148    [COMPLETED] EPINEPHrine-racemic (S-2) 2.25 % nebulizer solution 0.5 mL  0.5 mL Nebulization Once Jimmie Landeros MD   0.5 mL at 10/09/24 0240    [] sodium chloride 0.9 % IV bolus 100 mL  100 mL Intravenous Q30 Min PRN Moni Pugh MD        And     [] albumin human (Albumin) 25% injection 25 g  25 g Intravenous PRN Dialysis Moni Pugh MD        [COMPLETED] methylPREDNISolone sodium succinate (Solu-MEDROL) injection 125 mg  125 mg Intravenous Once Gordon Borden MD   125 mg at 10/06/24 1823    [COMPLETED] EPINEPHrine-racemic (S-2) 2.25 % nebulizer solution 0.5 mL  0.5 mL Nebulization Once Ollie Santos MD   0.5 mL at 10/06/24 181    [] methylPREDNISolone sodium succinate (Solu-MEDROL) 125 MG injection             [COMPLETED] potassium chloride (Klor-Con) 20 MEQ oral powder 20 mEq  20 mEq Oral Once Moni Pugh MD   20 mEq at 10/05/24 0556    [COMPLETED] fluconazole (Diflucan) tab 200 mg  200 mg Oral Once Lizbeth Rey MD   200 mg at 10/05/24 1610    [COMPLETED] potassium chloride (Klor-Con) 20 MEQ oral powder 10 mEq  10 mEq Oral Once Moni Pugh MD   10 mEq at 10/04/24 0736    [] ondansetron (Zofran) 4 MG/2ML injection 4 mg  4 mg Intravenous Once PRN Talha Gregg MD        [] prochlorperazine (Compazine) 10 MG/2ML injection 5 mg  5 mg Intravenous Once PRN Talha Gregg MD        [] haloperidol lactate (Haldol) 5 MG/ML injection 0.25 mg  0.25 mg Intravenous Once PRN Talha Gregg MD        [] fentaNYL (Sublimaze) 50 mcg/mL injection 25 mcg  25 mcg Intravenous Q5 Min PRN Talha Gregg MD   25 mcg at 10/04/24 1411    [] fentaNYL (Sublimaze) 50 mcg/mL injection 50 mcg  50 mcg Intravenous Q5 Min PRN Talha Gregg MD        [] HYDROmorphone (Dilaudid) 1 MG/ML injection 0.2 mg  0.2 mg Intravenous Q15 Min PRN Talha Gregg MD        [] HYDROmorphone (Dilaudid) 1 MG/ML injection 0.2 mg  0.2 mg Intravenous Q15 Min PRN Talha Gregg MD        [] HYDROmorphone (Dilaudid) 1 MG/ML injection 0.2 mg  0.2 mg Intravenous Q15 Min PRN Talha Gregg MD        [] atropine 0.1 MG/ML injection 0.5 mg  0.5 mg Intravenous PRN Talha Gregg MD        [] naloxone  (Narcan) 0.4 MG/ML injection 0.08 mg  0.08 mg Intravenous PRN Talha Gregg MD        [] sodium chloride 0.9 % IV bolus 500 mL  500 mL Intravenous Once PRN Humberto Murphy MD        [] diphenhydrAMINE (Benadryl) 50 mg/mL  injection 25 mg  25 mg Intravenous Once PRN Humberto Murphy MD        [COMPLETED] ceFAZolin (Ancef) 1 g in dextrose 5% 100mL IVPB-ADD  1 g Intravenous Q24H Humberto Murphy  mL/hr at 10/05/24 0833 1 g at 10/05/24 0833    [COMPLETED] clonidine-EPINEPHrine-ropivacaine-ketorolac (CERTS) (Duraclon-Adrenalin-Naropin-Toradol) pain cocktail irrigation   Intra-articular Once (Intra-Op) Humberto Murphy MD   Given at 10/04/24 1211    [] sodium chloride 0.9 % IV bolus 100 mL  100 mL Intravenous Q30 Min PRN Humberto Murphy MD        And    [] albumin human (Albumin) 25% injection 25 g  25 g Intravenous PRN Dialysis Humberto Murphy MD        [COMPLETED] tranexamic acid in sodium chloride 0.7% (Cyklokapron) 1000 mg/100mL infusion premix 1,000 mg  1,000 mg Intravenous 30 Min Pre-Op Humberto Murphy MD   1,000 mg at 10/04/24 1149    [COMPLETED] potassium chloride (Klor-Con) 20 MEQ oral powder 20 mEq  20 mEq Oral Once Mirta Redman MD   20 mEq at 10/02/24 202    [COMPLETED] sodium chloride 0.9 % IV bolus 250 mL  250 mL Intravenous Once Nora Jones APRN   Stopped at 10/01/24 1150    [] sodium chloride 0.9 % IV bolus 100 mL  100 mL Intravenous Q30 Min PRN Moni Pugh MD        And    [] albumin human (Albumin) 25% injection 25 g  25 g Intravenous PRN Dialysis Moni Pugh MD        [] piperacillin-tazobactam (Zosyn) 3.375 g in dextrose 5% 100 mL IVPB-ADDV  3.375 g Intravenous Q12H Jimmie Landeros MD 25 mL/hr at 10/04/24 1900 3.375 g at 10/04/24 1900    [COMPLETED] iopamidol 76% (ISOVUE-370) injection for power injector  80 mL Intravenous ONCE PRN Radha Gabriel MD   80 mL at 24 1126    [COMPLETED] furosemide (Lasix) 10 mg/mL  injection 40 mg  40 mg Intravenous Once Nick Moreau MD   40 mg at 24 0607    [] furosemide (Lasix) 10 mg/mL injection             [COMPLETED] hydrALAzine (Apresoline) 20 mg/mL injection 10 mg  10 mg Intravenous Once Nick Moreau MD   10 mg at 24 0626    [] etomidate (Amidate) 2 mg/mL injection             [COMPLETED] propofol (Diprivan) 10 MG/ML injection        1,000 mg at 24 1436    [COMPLETED] piperacillin-tazobactam (Zosyn) 4.5 g in dextrose 5% 100 mL IVPB-ADDV  4.5 g Intravenous Once Nick Moreau  mL/hr at 24 0853 4.5 g at 24 0853    [] sodium chloride 0.9 % IV bolus 100 mL  100 mL Intravenous Q30 Min PRN José Callahan MD        [COMPLETED] nitroGLYCERIN (Nitrobid) 2 % ointment 1 inch  1 inch Topical Once Nick Moreau MD   1 inch at 24 0654    [COMPLETED] hydrALAzine (Apresoline) 20 mg/mL injection 10 mg  10 mg Intravenous Once Nick Moreau MD   10 mg at 24 0654    [COMPLETED] ondansetron (Zofran) 4 MG/2ML injection        4 mg at 24 1241    [COMPLETED] metoclopramide (Reglan) 5 mg/mL injection 5 mg  5 mg Intravenous Once Karla Irvin APRN   5 mg at 24 1300    [COMPLETED] norepinephrine (Levophed) 4 mg/250mL infusion premix        4,000 mcg at 24 1455    [COMPLETED] succinylcholine (Anectine) 20 MG/ML injection 70.6 mg  1 mg/kg Intravenous Once Atul Sheridan MD   70.6 mg at 24 1445    [COMPLETED] HYDROcodone-acetaminophen (Norco) 5-325 MG per tab 1 tablet  1 tablet Oral Once Vitor Kline MD   1 tablet at 24 1245    [COMPLETED] morphINE PF 4 MG/ML injection 4 mg  4 mg Intravenous Once Vitor Kline MD   4 mg at 24 1448    [COMPLETED] sodium chloride 0.9 % IV bolus 1,000 mL  1,000 mL Intravenous Once Vitor Kline MD 1,000 mL/hr at 24 1447 1,000 mL at 24 1447    [COMPLETED] ondansetron (Zofran) 4 MG/2ML injection 4 mg  4 mg Intravenous Once Vitor Kline MD    4 mg at 24 1448    [COMPLETED] ceFAZolin (Ancef) 2g in 10mL IV syringe premix  2 g Intravenous 30 Min Pre-Op Humberto Murphy MD   2 g at 10/04/24 0915    [COMPLETED] heparin (Porcine) 5000 UNIT/ML injection 5,000 Units  5,000 Units Subcutaneous Once Humberto Murphy MD   5,000 Units at 24 1758    [COMPLETED] dilTIAZem (cardIZEM) 25 mg/5mL injection 20 mg  20 mg Intravenous Once Vitor Kline MD   20 mg at 24 1619     Current Outpatient Medications on File Prior to Encounter   Medication Sig Dispense Refill    acetaminophen 500 MG Oral Tab 1 tablet (500 mg total) by Per G Tube route in the morning and 1 tablet (500 mg total) before bedtime.      Gabapentin 300 MG/6ML Oral Solution 300 mg by Peg Tube route in the morning and 300 mg before bedtime. 450 mL 3    linaGLIPtin (TRADJENTA) 5 mg Oral Tab 1 tablet (5 mg total) by Per G Tube route daily. 90 tablet 3    atorvastatin 40 MG Oral Tab 1 tablet (40 mg total) by Per G Tube route nightly. 90 tablet 3    amiodarone 200 MG Oral Tab 1 tablet (200 mg total) by Per G Tube route daily. 90 tablet 3    carvedilol 25 MG Oral Tab 1 tablet (25 mg total) by Per G Tube route 2 (two) times daily. 180 tablet 3    B Complex-C-Folic Acid (RENAL MULTIVITAMIN FORMULA) Oral Tab 1 tablet by Per G Tube route daily. 90 tablet 3    clopidogrel 75 MG Oral Tab Take 1 tablet (75 mg total) by mouth daily. 90 tablet 3    albuterol (PROAIR HFA) 108 (90 Base) MCG/ACT Inhalation Aero Soln Inhale 2 puffs into the lungs every 4 (four) hours as needed for Wheezing. 3 each 3    fluticasone propionate 50 MCG/ACT Nasal Suspension 2 sprays by Nasal route daily. 48 g 3    [] amoxicillin clavulanate 250-125 MG Oral Tab 1 tablet (250 mg total) by Per G Tube route daily. Taking for swelling to left side of jaw      lansoprazole 30 MG Oral Tablet Delayed Release Dispersible 1 tablet (30 mg total) by Per G Tube route every morning before breakfast. 30 tablet 0    aspirin 81 MG Oral  Tab EC Take 1 tablet (81 mg total) by mouth daily. (Patient taking differently: Take 1 tablet (81 mg total) by mouth daily. Per G-tube) 90 tablet 3    cetirizine 10 MG Oral Tab Take 1 tablet (10 mg total) by mouth every other day.      polyethylene glycol, PEG 3350, 17 g Oral Powd Pack 17 g by Per G Tube route daily as needed (Constipation).      Insulin Lispro, 1 Unit Dial, (HUMALOG KWIKPEN) 100 UNIT/ML Subcutaneous Solution Pen-injector Take subcutaneously 4 times daily before tube feedings per sliding scale. Max total dose of 20 units. (Patient not taking: Reported on 2024) 6 mL 1    [] glucagon (GVOKE HYPOPEN 2-PACK) 1 MG/0.2ML Subcutaneous injection Inject 0.2 mL (1 mg total) into the skin once as needed for Low blood glucose. 1 each 0    Insulin Pen Needle 33G X 4 MM Does not apply Misc 1 each 4 (four) times daily. 200 each 1    albuterol (2.5 MG/3ML) 0.083% Inhalation Nebu Soln Take 3 mL (2.5 mg total) by nebulization every 4 (four) hours as needed for Wheezing or Shortness of Breath. (Patient not taking: Reported on 2024) 360 mL 3    HYDROcodone-acetaminophen 5-325 MG Oral Tab 1 tablet by Per G Tube route every 8 (eight) hours as needed. (Patient not taking: Reported on 2024) 90 tablet 0    Incontinence Supply Disposable (COMFORT PROTECT ADULT DIAPER/L) Does not apply Misc 1 Application daily. 30 each 3    Electronic Thermometer ([employer] DIGITAL THERMOMETER TEMPLE) Does not apply Misc Check temperature 1 each 0    insulin glargine (LANTUS SOLOSTAR) 100 UNIT/ML Subcutaneous Solution Pen-injector Inject 8 Units into the skin every morning. (Patient not taking: Reported on 2024) 15 mL 0    Insulin Pen Needle 32G X 4 MM Does not apply Misc Take as directed 200 each 3    Budesonide-Formoterol Fumarate (SYMBICORT) 160-4.5 MCG/ACT Inhalation Aerosol Inhale 2 puffs into the lungs 2 (two) times daily. (Patient not taking: Reported on 2024) 3 each 3    Starch-Maltodextrin (THICK-IT) Oral  Powd Pack Use as directed (Patient not taking: Reported on 12/6/2024) 200 each 3    albuterol (2.5 MG/3ML) 0.083% Inhalation Nebu Soln Take 3 mL (2.5 mg total) by nebulization in the morning and 3 mL (2.5 mg total) before bedtime. (Patient not taking: Reported on 12/3/2024)      lidocaine 4 % External Patch Place 1 patch onto the skin daily. (Patient not taking: Reported on 12/3/2024)      acetaminophen 500 MG Oral Tab Take 1 tablet (500 mg total) by mouth every 6 (six) hours as needed for Pain. (Patient not taking: Reported on 12/6/2024)      Cholecalciferol 125 MCG (5000 UT) Oral Tab Take 1 tablet (5,000 Units total) by mouth 2 (two) times daily. (Patient not taking: Reported on 12/3/2024)

## 2024-12-18 NOTE — PROGRESS NOTES
[facility]  part of [facility]    Progress Note    Ollie Hernández Patient Status:  Inpatient    1945 MRN [id number]   Location [facility] 5SW/SE Attending Dee Joyce,*   Hosp Day # 12 PCP Wili Parmar MD       Subjective:   Ollie Hernández is a(n) 79 year old male who I am seeing for ESRD          Objective:   /55 (BP Location: Left arm)   Pulse 76   Temp 98.6 °F (37 °C) (Oral)   Resp 18   Ht 5' 4\" (1.626 m)   Wt 152 lb 12.5 oz (69.3 kg)   SpO2 97%   BMI 26.22 kg/m²      Intake/Output Summary (Last 24 hours) at 2024 1119  Last data filed at 2024 0930  Gross per 24 hour   Intake 1836 ml   Output 1 ml   Net 1835 ml     Wt Readings from Last 1 Encounters:   24 152 lb 12.5 oz (69.3 kg)       Exam  Gen: No acute distress, Heent: NC AT, mucous memb clear, neck supple  Pulm: Lungs clear, normal respiratory effort  CV: Heart with regular rate and rhythm, no edema  Abd: Abdomen soft, nontender, nondistended, no organomegaly, bowel sounds present  Skin: no symptoms reported  Psych: alert and oriented    Assessment and Plan:     1 - ESRD  The patient is typically  for dialysis.  However he is off cycle as he had a A-fib with RVR episode on Monday.    He had a full dialysis treatment yesterday.  Plan dialysis tomorrow on Thursday which is not his usual day dialysis will need to be continued as it is a life preserving modality     Plan the next dialysis treatment on Thursday if he is here     2 -lower extremity pain  Physical therapy and pain medications     3 -anemia  Getting PRBC today.  He is on Epogen     4 -A-fib with RVR/coronary artery disease  Has Watchman  The patient is on aspirin and Plavix as well as amiodarone.  Cardiology following     5 -hypertension with ESRD  Pressure has been running very low.  Metoprolol dose was dropped but went back up after the A-fib with RVR episode     6 -aspiration  pneumonia/respiratory failure  On Unasyn        Results:     Recent Labs   Lab 12/16/24  0813 12/17/24  0534 12/18/24  0530   RBC 2.52* 2.86* 2.25*   HGB 7.6* 8.9* 6.6*   HCT 25.1* 28.1* 21.8*   MCV 99.6 98.3 96.9   MCH 30.2 31.1 29.3   MCHC 30.3* 31.7 30.3*   RDW 17.8* 17.7* 17.4*   NEPRELIM 6.35 6.98 7.52   WBC 8.5 9.3 9.5   .0 231.0 191.0         Recent Labs   Lab 12/16/24  0813 12/17/24  0534 12/18/24  0530   * 108*  108* 114*  114*   BUN 69* 55*  55* 30*  30*   CREATSERUM 4.39* 3.74*  3.74* 2.09*  2.09*   CA 9.6 9.8  9.8 6.7*  6.7*   * 136  136 150*  150*   K 5.1 4.9  4.9 2.8*  2.8*   CL 97* 98  98 109  109   CO2 33.0* 31.0  31.0 24.0  24.0          XR PAIN CLINIC FLUOROSCOPY - N/C    Result Date: 12/18/2024  CONCLUSION: Intraoperative fluoroscopy provided.  Please see surgical note for specific details.     Dictated by (CST): Kevin Hensley MD on 12/18/2024 at 7:36 AM     Finalized by (CST): Kevin Hensley MD on 12/18/2024 at 7:37 AM               SONDRA MART MD  12/18/2024    [Awake] : awake [Alert] : alert [Soft] : soft [Oriented x3] : oriented to person, place, and time [No Lesions] : no genitalia lesions [Normal] : uterus [Labia Majora] : labia major [No Bleeding] : there was no active vaginal bleeding [Pap Obtained] : a Pap smear was performed [Anteversion] : anteverted [Uterine Adnexae] : were not tender and not enlarged [RRR, No Murmurs] : RRR, no murmurs [CTAB] : CTAB [Acute Distress] : no acute distress [LAD] : no lymphadenopathy [Thyroid Nodule] : no thyroid nodule [Goiter] : no goiter [Mass] : no breast mass [Nipple Discharge] : no nipple discharge [Axillary LAD] : no axillary lymphadenopathy [Tender] : non tender [Distended] : not distended [H/Smegaly] : no hepatosplenomegaly [Depressed Mood] : not depressed [Flat Affect] : affect not flat [Discharge] : had no discharge [Motion Tenderness] : there was no cervical motion tenderness [Tenderness] : nontender [Enlarged ___ wks] : not enlarged [Adnexa Tenderness] : were not tender [Ovarian Mass (___ Cm)] : there were no adnexal masses [[email or web address]] : GC chlamydia culture

## 2024-12-18 NOTE — PHYSICAL THERAPY NOTE
PHYSICAL THERAPY TREATMENT NOTE - INPATIENT     Room Number: 553/553-A       Presenting Problem: peripheral neuropathy  Co-Morbidities : BLE leg pain    Problem List  Principal Problem:    Peripheral polyneuropathy  Active Problems:    ESRD on hemodialysis (HCC)    Hypophosphatemia    PHYSICAL THERAPY ASSESSMENT   Patient demonstrates fair progress this session, goals  remain in progress.      Patient is requiring dependent as a result of the following impairments: decreased functional strength, decreased endurance/aerobic capacity, pain, impaired sitting/standing balance, decreased muscular endurance, and medical status.     Patient continues to function below baseline with bed mobility, transfers, gait, stair negotiation, maintaining seated position, standing prolonged periods, and performing household tasks.  Next session anticipate patient to progress bed mobility, transfers, gait, stair negotiation, maintaining seated position, standing prolonged periods, and performing household tasks.  Physical Therapy will continue to follow patient for duration of hospitalization.    Patient continues to benefit from continued skilled PT services: to promote return to prior level of function and safety with continuous assistance and gradual rehabilitative therapy .    PLAN DURING HOSPITALIZATION  Nursing Mobility Recommendation : Lift Equipment  PT Device Recommendation: Rolling walker  PT Treatment Plan: Bed mobility;Body mechanics;Coordination;Endurance;Energy conservation;Gait training;Strengthening;Transfer training;Balance training  Frequency (Obs): 3-5x/week     SUBJECTIVE  \"I want to try\"    OBJECTIVE  Precautions: Limb alert - right;Bed/chair alarm (patient with low hemoglobin 6.6 at 5:30 this morning, patient over 2 hours into transfusion, RN reports patient asymptomatic and cleared patient for sit to stand transfer activity)    PAIN ASSESSMENT   Ratin  Location: generalized  Management Techniques: Activity  promotion;Body mechanics;Relaxation;Repositioning    BALANCE  Static Sitting: Fair -  Dynamic Sitting: Poor -  Static Standing: Dependent  Dynamic Standing: Dependent    ACTIVITY TOLERANCE  Pulse: 82  Heart Rate Source: Monitor  BP: 145/69  BP Location: Left arm  BP Method: Automatic  Patient Position: Sitting     O2 WALK  Oxygen Therapy  SPO2% on Oxygen at Rest: 99  At rest oxygen flow (liters per minute): 3    AM-PAC '6-Clicks' INPATIENT SHORT FORM - BASIC MOBILITY  How much difficulty does the patient currently have...  Patient Difficulty: Turning over in bed (including adjusting bedclothes, sheets and blankets)?: Unable   Patient Difficulty: Sitting down on and standing up from a chair with arms (e.g., wheelchair, bedside commode, etc.): Unable   Patient Difficulty: Moving from lying on back to sitting on the side of the bed?: Unable   How much help from another person does the patient currently need...   Help from Another: Moving to and from a bed to a chair (including a wheelchair)?: Total   Help from Another: Need to walk in hospital room?: Total   Help from Another: Climbing 3-5 steps with a railing?: Total     AM-PAC Score:  Raw Score: 6   Approx Degree of Impairment: 100%   Standardized Score (AM-PAC Scale): 23.55   CMS Modifier (G-Code): CN    FUNCTIONAL ABILITY STATUS  Functional Mobility/Gait Assessment  Gait Assistance: Not tested  Rolling:  not tested  Supine to Sit:  not tested (patient up in recliner chair at start/end of session)  Sit to Supine:  not tested  Sit to Stand: dependent (maximal assistance x 2)    Skilled Therapy Provided: Patient received seated in bedside recliner at initiation of session agreeable to participation in PT. Per RN patient appropriate for participation in therapy. Patient tolerates treatment fairly, able to perform sit to stand transfer from recliner chair with maximal assistance x 2. B feet sliding on first repetition, improved on second repetition with therapist  cueing/knee block. Tolerates standing for ~15 seconds on second rep. Patient left in bedside chair, lines intact, needs in reach and handoff to RN.      The patient's Approx Degree of Impairment: 100% has been calculated based on documentation in the [facility] '6 clicks' Inpatient Daily Activity Short Form.  Research supports that patients with this level of impairment may benefit from LTAC, however given patient's previous level of function will recommend subacute rehab.  Final disposition will be made by interdisciplinary medical team.    THERAPEUTIC EXERCISES  Lower Extremity Ankle pumps  Knee extension     Position Sitting       Patient End of Session: Up in chair;Needs met;Call light within reach;RN aware of session/findings;All patient questions and concerns addressed;Hospital anti-slip socks;Alarm set    CURRENT GOALS   Goals to be met by: 12/20/24  Patient Goal Patient's self-stated goal is: to go home   Goal #1 Patient is able to demonstrate supine - sit EOB @ level: minimum assistance      Goal #1   Current Status NT   Goal #2 Patient is able to demonstrate transfers Sit to/from Stand at assistance level: minimum assistance with walker - rolling      Goal #2  Current Status Max A x 2   Goal #3 Patient is able to ambulate 10 feet with assist device: walker - rolling at assistance level: minimum assistance   Goal #3   Current Status NT   Goal #4 Patient to demonstrate independence with home activity/exercise instructions provided to patient in preparation for discharge.   Goal #4   Current Status In progress     Therapeutic Activity: 10 minutes    Amaris Martinez PT, DPT

## 2024-12-19 LAB
ALBUMIN SERPL-MCNC: 3.6 G/DL (ref 3.2–4.8)
ANION GAP SERPL CALC-SCNC: 8 MMOL/L (ref 0–18)
BASOPHILS # BLD AUTO: 0.06 X10(3) UL (ref 0–0.2)
BASOPHILS NFR BLD AUTO: 0.4 %
BLOOD TYPE BARCODE: 6200
BUN BLD-MCNC: 55 MG/DL (ref 9–23)
BUN/CREAT SERPL: 14.9 (ref 10–20)
CALCIUM BLD-MCNC: 10.1 MG/DL (ref 8.7–10.4)
CHLORIDE SERPL-SCNC: 96 MMOL/L (ref 98–112)
CO2 SERPL-SCNC: 31 MMOL/L (ref 21–32)
CREAT BLD-MCNC: 3.69 MG/DL
DEPRECATED RDW RBC AUTO: 62.8 FL (ref 35.1–46.3)
EGFRCR SERPLBLD CKD-EPI 2021: 16 ML/MIN/1.73M2 (ref 60–?)
EOSINOPHIL # BLD AUTO: 0.11 X10(3) UL (ref 0–0.7)
EOSINOPHIL NFR BLD AUTO: 0.8 %
ERYTHROCYTE [DISTWIDTH] IN BLOOD BY AUTOMATED COUNT: 17.9 % (ref 11–15)
GLUCOSE BLD-MCNC: 133 MG/DL (ref 70–99)
GLUCOSE BLDC GLUCOMTR-MCNC: 159 MG/DL (ref 70–99)
GLUCOSE BLDC GLUCOMTR-MCNC: 165 MG/DL (ref 70–99)
GLUCOSE BLDC GLUCOMTR-MCNC: 180 MG/DL (ref 70–99)
HCT VFR BLD AUTO: 32.6 %
HEMOCCULT STL QL: NEGATIVE
HGB BLD-MCNC: 10 G/DL
IMM GRANULOCYTES # BLD AUTO: 0.19 X10(3) UL (ref 0–1)
IMM GRANULOCYTES NFR BLD: 1.4 %
LYMPHOCYTES # BLD AUTO: 1.06 X10(3) UL (ref 1–4)
LYMPHOCYTES NFR BLD AUTO: 7.9 %
MAGNESIUM SERPL-MCNC: 2.6 MG/DL (ref 1.6–2.6)
MCH RBC QN AUTO: 29.8 PG (ref 26–34)
MCHC RBC AUTO-ENTMCNC: 30.7 G/DL (ref 31–37)
MCV RBC AUTO: 97 FL
MONOCYTES # BLD AUTO: 1.07 X10(3) UL (ref 0.1–1)
MONOCYTES NFR BLD AUTO: 7.9 %
NEUTROPHILS # BLD AUTO: 11.01 X10 (3) UL (ref 1.5–7.7)
NEUTROPHILS # BLD AUTO: 11.01 X10(3) UL (ref 1.5–7.7)
NEUTROPHILS NFR BLD AUTO: 81.6 %
OSMOLALITY SERPL CALC.SUM OF ELEC: 297 MOSM/KG (ref 275–295)
PHOSPHATE SERPL-MCNC: 2.6 MG/DL (ref 2.4–5.1)
PLATELET # BLD AUTO: 284 10(3)UL (ref 150–450)
POTASSIUM SERPL-SCNC: 4.4 MMOL/L (ref 3.5–5.1)
RBC # BLD AUTO: 3.36 X10(6)UL
SODIUM SERPL-SCNC: 135 MMOL/L (ref 136–145)
UNIT VOLUME: 350 ML
WBC # BLD AUTO: 13.5 X10(3) UL (ref 4–11)

## 2024-12-19 PROCEDURE — 99233 SBSQ HOSP IP/OBS HIGH 50: CPT | Performed by: HOSPITALIST

## 2024-12-19 PROCEDURE — 99232 SBSQ HOSP IP/OBS MODERATE 35: CPT | Performed by: ANESTHESIOLOGY

## 2024-12-19 PROCEDURE — 99233 SBSQ HOSP IP/OBS HIGH 50: CPT | Performed by: INTERNAL MEDICINE

## 2024-12-19 RX ORDER — ALBUMIN (HUMAN) 12.5 G/50ML
25 SOLUTION INTRAVENOUS
Status: ACTIVE | OUTPATIENT
Start: 2024-12-21 | End: 2024-12-22

## 2024-12-19 RX ORDER — HYDRALAZINE HYDROCHLORIDE 20 MG/ML
10 INJECTION INTRAMUSCULAR; INTRAVENOUS EVERY 6 HOURS PRN
Status: ACTIVE | OUTPATIENT
Start: 2024-12-19 | End: 2024-12-20

## 2024-12-19 NOTE — PROGRESS NOTES
[facility]  part of [facility]    Progress Note    Ollie Hernández Patient Status:  Inpatient    1945 MRN [id number]   Location [facility] 5SW/SE Attending Dee Joyce,*   Hosp Day # 13 PCP Wili Parmar MD     Chief Complaint: feet hurt    Subjective:     Constitutional:  Positive for appetite change and fatigue.   Respiratory:  Positive for cough. Negative for choking.    Cardiovascular:  Negative for leg swelling.   Gastrointestinal:  Positive for abdominal pain, constipation and abdominal distention.   Genitourinary:  Negative for dysuria.   Musculoskeletal:  Positive for back pain.   Neurological:  Positive for weakness.   Psychiatric/Behavioral:  Negative for behavioral problems and decreased concentration. The patient is not hyperactive.        Objective:   Blood pressure 142/71, pulse 75, temperature 98.4 °F (36.9 °C), temperature source Oral, resp. rate 18, height 5' 4\" (1.626 m), weight 164 lb 0.4 oz (74.4 kg), SpO2 100%.  Physical Exam  Vitals and nursing note reviewed.   HENT:      Head: Normocephalic and atraumatic.   Cardiovascular:      Rate and Rhythm: Normal rate.   Pulmonary:      Breath sounds: Rhonchi present.   Skin:     General: Skin is warm and dry.      Capillary Refill: Capillary refill takes less than 2 seconds.   Neurological:      General: No focal deficit present.      Mental Status: Mental status is at baseline.   Psychiatric:         Behavior: Behavior normal.         Results:   Lab Results   Component Value Date    WBC 13.5 (H) 2024    HGB 10.0 (L) 2024    HCT 32.6 (L) 2024    .0 2024    CREATSERUM 3.69 (H) 2024    BUN 55 (H) 2024     (L) 2024    K 4.4 2024    CL 96 (L) 2024    CO2 31.0 2024     (H) 2024    CA 10.1 2024    ALB 3.6 2024    ALKPHO 116 2024    BILT 0.3 2024    TP 5.7 2024    AST 32 2024    ALT 29  12/07/2024    PTT 30.1 08/09/2024    INR 1.45 (H) 08/09/2024    T4F 0.9 08/31/2022    TSH 2.844 07/04/2024     (H) 07/30/2024    PSA 2.94 10/20/2021    DDIMER 6.07 (H) 09/29/2024    ESRML 10 06/16/2024    CRP 1.30 (H) 06/16/2024    MG 2.6 12/19/2024    PHOS 2.6 12/19/2024    TROP <0.045 07/25/2019    TROPHS 11 12/16/2024     08/05/2023    B12 672 07/04/2024       XR PAIN CLINIC FLUOROSCOPY - N/C    Result Date: 12/18/2024  CONCLUSION: Intraoperative fluoroscopy provided.  Please see surgical note for specific details.     Dictated by (CST): Kevin Hensley MD on 12/18/2024 at 7:36 AM     Finalized by (CST): Kevin Hensley MD on 12/18/2024 at 7:37 AM               Assessment & Plan:     Peripheral polyneuropathy; checked rpr ok; thyroid; electropheresis; b12 ok 7/24  -Patient p/w bilateral lower extremity pain  -Significantly worse over the past 3 to 4 days.  -No real improvement with gabapentin at home  -Difficulties with ambulation because of pain  -Pain control as able; pt screaming in pain or asleep; pain clinic saw; await mri  poss steroid   -PT/OT  -Continue to monitor  --increased confusion and hypoxia on p.o. narcotics, will discontinue all narcotics at this time, if pain ensues consider PCA for better autoregulation.  Has not needed them so far.  - WILLIAM on 12/17/2024 which is when he would be 7 days away from Plavix.  Appreciate pain service; ok to restart palvix     Acute respiratory failure, confusion/hypoxia: Significant event 12/14/2020/probable aspiration pneumonia  Possibly secondary to narcotics although concern for aspiration versus pneumonia  Chest x-ray consistent with some pulmonary edema and questionable underlying pneumonia  CT head no acute findings  ABG reviewed no CO2 narcosis  IV Unasyn initiated  O2 support as needed, wean as able  Continue to monitor     ESRD on HD  -Normally receives dialysis on Monday Wednesday Friday.  .  -Nephrology consulted, further HD per  nephrology.had dialysis 12/18     Anemia of chronic renal disease and iron defy   -checked stool for blood, neg;  -Likely secondary to ESRD as above  -Hemoglobin noted to be low transfused again 12/18  -Continue to monitor      Afib :   has bel romano put in in 9/2024; mri ok with watchman per dr coffey  12/13: Patient had episode of asymptomatic transient RVR self-limited.  12/16: Again RVR during dialysis, reach out to cardiology for any further medication adjustments.    Beta-blocker increased, currently rate controlled appreciate cardiology recommendations        Type 2 DM:  - Monitoring Blood glucose with POC checks  - SSI to cover hyperglycemia  - Hypoglycemia protocol  - Will monitor and adjust agents as needed.  -Patient has appoint with Dr. Sevilla on 12/17/2024.  If blood sugars increase after steroid injection will reach out to endocrinology if not patient needs to reschedule since there is no urgent reason for consult at this time.             -KRAIG  -Hypertension  -Chronic dCHF  -Pulm HTN    Adb bloating and constip from narcs           Patient will require inpatient services that will reasonably be expected to span two midnight's based on the clinical documentation in H+P.   Based on patients current state of illness, I anticipate that, after discharge, patient will require jefferson    Complex mdm; coordinating care with nurse/dr bridges and counseling pt and with his permission his wife  about pain poss dc tomorrow      BRITTANY WAY MD

## 2024-12-19 NOTE — PROGRESS NOTES
[facility]  part of [facility]    Progress Note    Ollie Hernández Patient Status:  Inpatient    1945 MRN [id number]   Location [facility] 5SW/SE Attending Dee Joyce,*   Hosp Day # 13 PCP Wili Parmar MD       Subjective:   Ollie Hernández is a(n) 79 year old male who I am seeing for ESRD      Seen on dialysis    Objective:   /71 (BP Location: Left arm)   Pulse 75   Temp 98.4 °F (36.9 °C) (Oral)   Resp 18   Ht 5' 4\" (1.626 m)   Wt 164 lb 0.4 oz (74.4 kg)   SpO2 100%   BMI 28.15 kg/m²      Intake/Output Summary (Last 24 hours) at 2024 1024  Last data filed at 2024 0855  Gross per 24 hour   Intake 1831.83 ml   Output 0 ml   Net 1831.83 ml     Wt Readings from Last 1 Encounters:   24 164 lb 0.4 oz (74.4 kg)       Exam  Gen: No acute distress, Heent: NC AT, mucous memb clear, neck supple  Pulm: Lungs clear, normal respiratory effort  CV: Heart with regular rate and rhythm, no edema  Abd: Abdomen soft, nontender, nondistended, no organomegaly, bowel sounds present  Skin: no symptoms reported  Psych: alert and oriented  Right upper extremity AV fistula cannulated  Assessment and Plan:       1 - ESRD  The patient is typically  for dialysis.  However he is off cycle as he had a A-fib with RVR episode on Monday.     He had a full dialysis treatment yesterday.  Plan dialysis on Thursday which is not his usual day dialysis will need to be continued as it is a life preserving modality     Plan the next dialysis treatment on Saturday if he is here     2 -lower extremity pain  Physical therapy and pain medications     3 -anemia  Status post PRBC today.  He is on Epogen     4 -A-fib with RVR/coronary artery disease  Has Watchman  The patient is on aspirin and Plavix as well as amiodarone.  Cardiology following     5 -hypertension with ESRD  Pressure has been running very low.  Metoprolol dose was dropped but went back up  after the A-fib with RVR episode     6 -aspiration pneumonia/respiratory failure  On Unasyn               Results:     Recent Labs   Lab 12/17/24  0534 12/18/24  0530 12/18/24  1620 12/19/24  0540   RBC 2.86* 2.25*  --  3.36*   HGB 8.9* 6.6* 10.0* 10.0*   HCT 28.1* 21.8*  --  32.6*   MCV 98.3 96.9  --  97.0   MCH 31.1 29.3  --  29.8   MCHC 31.7 30.3*  --  30.7*   RDW 17.7* 17.4*  --  17.9*   NEPRELIM 6.98 7.52  --  11.01*   WBC 9.3 9.5  --  13.5*   .0 191.0  --  284.0         Recent Labs   Lab 12/17/24  0534 12/18/24  0530 12/19/24  0540   *  108* 114*  114* 133*   BUN 55*  55* 30*  30* 55*   CREATSERUM 3.74*  3.74* 2.09*  2.09* 3.69*   CA 9.8  9.8 6.7*  6.7* 10.1     136 150*  150* 135*   K 4.9  4.9 2.8*  2.8* 4.4   CL 98  98 109  109 96*   CO2 31.0  31.0 24.0  24.0 31.0          XR PAIN CLINIC FLUOROSCOPY - N/C    Result Date: 12/18/2024  CONCLUSION: Intraoperative fluoroscopy provided.  Please see surgical note for specific details.     Dictated by (CST): Kevin Hensley MD on 12/18/2024 at 7:36 AM     Finalized by (CST): Kevin Hensley MD on 12/18/2024 at 7:37 AM               SONDRA MART MD  12/19/2024

## 2024-12-19 NOTE — PROGRESS NOTES
Cardiology Progress Note  [facility]    Ollie Hernández Patient Status:  Inpatient    1945 MRN [id number]   Location [facility] 5SW/SE Attending Lizbeth Rey MD   Hosp Day # 13 PCP Wili Parmar MD     Primary Cardiologist: Dr. Phelan    Reason for Consultation:  Afib    Subjective:  Doing well. No CV complaints. Getting dialysis this morning. We increased his metoprolol to 100mg BID yesterday. No episodes of afib with RVR since.    Assessment/Plan:  Afib s/p Watchman 24 (24mm Watchman FLX )  CAD s/p PCI LCX with Ramin Harlan ABIMBOLA x 1 (24)   HFpEF  ESRD on iHD  T2DM  KRAIG  HTN  Polyneuropathy, needing MRI    - plavix being held, continue ASA. Okay to restart DAPT when able.  - previously had hypotension so metoprolol dose was dropped. May be why rates are worse. BP is in the 140's now, so back to increased dose. Tolerated dialysis well.  - metoprolol to 100mg BID. HR okay.  - IV 5mg prn if needed during dialysis.   - TTE reviewed. RVSP 62 mmHg.   - RVSP 62 mmHg and right pleural effusion- recommend further dialysis and challenge dry weight.  - Tele  - Will continue to follow.    History:  Past Medical History:    Anemia    Asthma (HCC)    Back problem    BPH (benign prostatic hyperplasia)    Calculus of kidney    Cataract    Coronary atherosclerosis    Diabetes (HCC)    ESRD (end stage renal disease) on dialysis (HCC)    Essential hypertension    High blood pressure    High cholesterol    History of blood transfusion    Hyperlipidemia    Neuropathy    hands and feet    KRAIG on CPAP    Renal disorder    Sleep apnea    Visual impairment    glasses    Vocal cord paralysis, unilateral partial     Past Surgical History:   Procedure Laterality Date    Appendectomy          Back surgery      Neck/back -     Capsule endoscopy - internal referral      Cataract extraction Right 2022    PHACOEMULSIFICATION WITH POSTERIOR CHAMBER INTRAOCULAR LENS, RIGHT EYE    Cataract  extraction Left 12/21/2021    PHACOEMULSIFICATION WITH POSTERIOR CHAMBER INTRAOCULAR LENS, LEFT EYE    Cath bare metal stent (bms)      Cath drug eluting stent  05/21/2024    Successful IVUS guided PCI of the circumflex with a ABIMBOLA    Colonoscopy      Colonoscopy N/A 01/25/2021    Procedure: COLONOSCOPY;  Surgeon: Michael Gautam MD;  Location: [facility] ENDO    Colonoscopy N/A 06/03/2024    Procedure: COLONOSCOPY;  Surgeon: Gabriel Saldana MD;  Location: [facility] ENDOSCOPY    Colonoscopy N/A 06/15/2024    Procedure: COLONOSCOPY;  Surgeon: Carlyn Storey MD;  Location: [facility] ENDOSCOPY    Colonoscopy N/A 07/31/2024    Procedure: COLONOSCOPY;  Surgeon: Gabriel Saldana MD;  Location: [facility] ENDOSCOPY    Dialysis procedure  02/17/2023    right internal jugular tunneled dialysis catheter placement.    Hand/finger surgery unlisted      Accidental trauma    Spine surgery procedure unlisted      Total hip arthroplasty Left 10/04/2024    Left anterior total hip arthroplasty    Upper gi endoscopy,diagnosis       Family History   Problem Relation Age of Onset    Cancer Father         Kidney    Breast Cancer Mother     Diabetes Mother     Diabetes Maternal Grandmother     Diabetes Maternal Grandfather     Heart Disorder Other         Uncle      reports that he quit smoking about 43 years ago. His smoking use included cigarettes. He started smoking about 61 years ago. He has a 17 pack-year smoking history. He has never used smokeless tobacco. He reports that he does not drink alcohol and does not use drugs.    Allergies:  Allergies[1]    Medications:  Medications Ordered Prior to Encounter[2]    Review of Systems:  As above, otherwise negative.      Physical Exam:  Blood pressure 142/71, pulse 75, temperature 98.4 °F (36.9 °C), temperature source Oral, resp. rate 18, height 5' 4\" (1.626 m), weight 164 lb 0.4 oz (74.4 kg), SpO2 100%.  Wt Readings from Last 3 Encounters:   12/19/24 164 lb 0.4 oz (74.4 kg)   11/30/24 145 lb (65.8 kg)   10/22/24  132 lb 14.4 oz (60.3 kg)       General: awake, alert, oriented x 3, no acute distress  HEENT: at/nc, perrl, eomi  Neck: No JVD, carotids 2+ no bruits.  Cardiac: Regular rate and rhythm, S1, S2 normal, no rub or gallop. Soft grade ii/vi systolic murmur  Lungs: Clear without wheezes, rales, rhonchi or dullness.  Normal excursions and effort.  Abdomen: Soft, non-tender, non-distended, normal bowel sounds   Extremities: Without clubbing, cyanosis or edema.  Peripheral pulses are 2+.  Neurologic: Alert and oriented, normal affect.  Psych: normal mood and affect  Skin: Warm and dry.       Laboratories and Data:  Diagnostics:      Labs:   CBC:    Lab Results   Component Value Date    WBC 13.5 (H) 12/19/2024    WBC 9.5 12/18/2024    WBC 9.3 12/17/2024     Lab Results   Component Value Date    HGB 10.0 (L) 12/19/2024    HGB 10.0 (L) 12/18/2024    HGB 6.6 (LL) 12/18/2024      Lab Results   Component Value Date    .0 12/19/2024    .0 12/18/2024    .0 12/17/2024     BMP:   No results found for: \"GLUCOSE\"  Lab Results   Component Value Date    K 4.4 12/19/2024    K 2.8 (LL) 12/18/2024    K 2.8 (LL) 12/18/2024     Lab Results   Component Value Date    BUN 55 (H) 12/19/2024    BUN 30 (H) 12/18/2024    BUN 30 (H) 12/18/2024     Lab Results   Component Value Date    CREATSERUM 3.69 (H) 12/19/2024    CREATSERUM 2.09 (H) 12/18/2024    CREATSERUM 2.09 (H) 12/18/2024     Cholesterol:     Lab Results   Component Value Date    CHOLEST 179 09/29/2024    CHOLEST 146 07/31/2024    CHOLEST 153 07/04/2024     Lab Results   Component Value Date    HDL 41 09/29/2024    HDL 29 (L) 07/31/2024    HDL 41 07/04/2024     Lab Results   Component Value Date    TRIG 259 (H) 10/17/2024    TRIG 160 (H) 10/04/2024    TRIG 206 (H) 10/02/2024     Lab Results   Component Value Date     (H) 09/29/2024    LDL 93 07/31/2024     (H) 07/04/2024     Lab Results   Component Value Date    AST 32 12/07/2024    AST 42 (H) 12/06/2024     AST 71 (H) 2024     Lab Results   Component Value Date    ALT 29 2024    ALT 38 2024    ALT 57 (H) 2024           Luis Orozco MD  Interventional Cardiology         [1]   Allergies  Allergen Reactions    Adhesive Tape OTHER (SEE COMMENTS)     Severe rashes    Dust Mite Extract RASH   [2]   Current Facility-Administered Medications on File Prior to Encounter   Medication Dose Route Frequency Provider Last Rate Last Admin    [COMPLETED] acetaminophen (Tylenol) 160 MG/5ML oral liquid 500 mg  500 mg Per G Tube Once Gabriela Allen MD   500 mg at 24 0209    [COMPLETED] gabapentin (Neurontin) 250 MG/5ML oral solution 300 mg  300 mg Per G Tube Once Gabriela Allen MD   300 mg at 24 0440    [COMPLETED] ceFAZolin (Ancef) 2g in 10mL IV syringe premix  2 g Intravenous Once Yash Sheppard MD   2 g at 10/19/24 1036    [] adult 3 in 1 TPN   Intravenous Continuous TPN Pranay Michel MD   Paused at 10/19/24 0250    [COMPLETED] sodium chloride 0.9 % IV bolus 250 mL  250 mL Intravenous Once Lorelei Mata  mL/hr at 10/17/24 0023 250 mL at 10/17/24 0023    [] adult 3 in 1 TPN   Intravenous Continuous TPN Pranay Michel MD 50 mL/hr at 10/17/24 2233 New Bag at 10/17/24 2233    [COMPLETED] dilTIAZem (cardIZEM) 25 mg/5mL injection 10 mg  10 mg Intravenous Once Pranay Michel MD   10 mg at 10/17/24 1330    [COMPLETED] sodium chloride 0.9 % IV bolus 250 mL  250 mL Intravenous Once Pranay Michel  mL/hr at 10/17/24 1700 250 mL at 10/17/24 1700    [COMPLETED] amiodarone (Cordarone) 150 mg in dextrose 5% 100 mL IV bolus  150 mg Intravenous Once Emerson Julio  mL/hr at 10/17/24 1805 150 mg at 10/17/24 1805    [COMPLETED] digoxin (Lanoxin) 250 MCG/ML injection 250 mcg  250 mcg Intravenous Once Herman Phelan MD   250 mcg at 10/17/24 1750    [] sodium chloride 0.9 % IV bolus 100 mL  100 mL Intravenous Q30 Min PRN José Callahan MD        And    []  albumin human (Albumin) 25% injection 25 g  25 g Intravenous PRN Dialysis José Callahan MD        [] dextrose 5%-sodium chloride 0.45% infusion   Intravenous Continuous Lorelei Mata MD 50 mL/hr at 10/16/24 0038 New Bag at 10/16/24 0038    [] adult 3 in 1 TPN   Intravenous Continuous TPN Pranay Michel MD 50 mL/hr at 10/16/24 2118 New Bag at 10/16/24 2118    [COMPLETED] metoprolol (Lopressor) 5 mg/5mL injection 5 mg  5 mg Intravenous Once Herman Phelan MD   5 mg at 10/16/24 2307    [] sodium chloride 0.9 % IV bolus 100 mL  100 mL Intravenous Q30 Min PRN José Callahan MD        And    [] albumin human (Albumin) 25% injection 25 g  25 g Intravenous PRN Dialysis José Callahan MD        [] sodium chloride 0.9 % IV bolus 100 mL  100 mL Intravenous Q30 Min PRN Walker Klein MD        And    [] albumin human (Albumin) 25% injection 25 g  25 g Intravenous PRN Dialysis Walker Klein MD        [COMPLETED] metoprolol (Lopressor) 5 mg/5mL injection 5 mg  5 mg Intravenous Once Radha Dunham MD   5 mg at 10/12/24 0304    [COMPLETED] methylPREDNISolone sodium succinate (Solu-MEDROL) injection 40 mg  40 mg Intravenous BID Jayjay Mijares MD   40 mg at 10/13/24 0919    [] sodium chloride 0.9 % IV bolus 100 mL  100 mL Intravenous Q30 Min PRN Walker Klein MD        And    [] albumin human (Albumin) 25% injection 25 g  25 g Intravenous PRN Dialysis Walker Klein MD   25 g at 10/11/24 1900    [COMPLETED] methylPREDNISolone sodium succinate (Solu-MEDROL) injection 60 mg  60 mg Intravenous Once Jimime Landeros MD   60 mg at 10/09/24 0148    [COMPLETED] EPINEPHrine-racemic (S-2) 2.25 % nebulizer solution 0.5 mL  0.5 mL Nebulization Once Jimmie Landeros MD   0.5 mL at 10/09/24 0240    [] sodium chloride 0.9 % IV bolus 100 mL  100 mL Intravenous Q30 Min PRN Moni Pugh MD        And    [] albumin human (Albumin) 25%  injection 25 g  25 g Intravenous PRN Dialysis Moni Pugh MD        [COMPLETED] methylPREDNISolone sodium succinate (Solu-MEDROL) injection 125 mg  125 mg Intravenous Once Gordon Borden MD   125 mg at 10/06/24 1823    [COMPLETED] EPINEPHrine-racemic (S-2) 2.25 % nebulizer solution 0.5 mL  0.5 mL Nebulization Once Ollie aSntos MD   0.5 mL at 10/06/24 181    [] methylPREDNISolone sodium succinate (Solu-MEDROL) 125 MG injection             [COMPLETED] potassium chloride (Klor-Con) 20 MEQ oral powder 20 mEq  20 mEq Oral Once Moni Pugh MD   20 mEq at 10/05/24 0556    [COMPLETED] fluconazole (Diflucan) tab 200 mg  200 mg Oral Once Lizbeth Rey MD   200 mg at 10/05/24 1610    [COMPLETED] potassium chloride (Klor-Con) 20 MEQ oral powder 10 mEq  10 mEq Oral Once Moni Pugh MD   10 mEq at 10/04/24 0736    [] ondansetron (Zofran) 4 MG/2ML injection 4 mg  4 mg Intravenous Once PRN Talha Gregg MD        [] prochlorperazine (Compazine) 10 MG/2ML injection 5 mg  5 mg Intravenous Once PRN Talha Gregg MD        [] haloperidol lactate (Haldol) 5 MG/ML injection 0.25 mg  0.25 mg Intravenous Once PRN Talha Gregg MD        [] fentaNYL (Sublimaze) 50 mcg/mL injection 25 mcg  25 mcg Intravenous Q5 Min PRN Talha Gregg MD   25 mcg at 10/04/24 1411    [] fentaNYL (Sublimaze) 50 mcg/mL injection 50 mcg  50 mcg Intravenous Q5 Min PRN Talha Gregg MD        [] HYDROmorphone (Dilaudid) 1 MG/ML injection 0.2 mg  0.2 mg Intravenous Q15 Min PRN Talha Gregg MD        [] HYDROmorphone (Dilaudid) 1 MG/ML injection 0.2 mg  0.2 mg Intravenous Q15 Min PRN Talha Gregg MD        [] HYDROmorphone (Dilaudid) 1 MG/ML injection 0.2 mg  0.2 mg Intravenous Q15 Min PRN Talha Gregg MD        [] atropine 0.1 MG/ML injection 0.5 mg  0.5 mg Intravenous PRN Talha Gregg MD        [] naloxone (Narcan) 0.4 MG/ML injection 0.08 mg   0.08 mg Intravenous PRN Talha Gregg MD        [] sodium chloride 0.9 % IV bolus 500 mL  500 mL Intravenous Once PRN Humberto Murphy MD        [] diphenhydrAMINE (Benadryl) 50 mg/mL  injection 25 mg  25 mg Intravenous Once PRN Humberto Murphy MD        [COMPLETED] ceFAZolin (Ancef) 1 g in dextrose 5% 100mL IVPB-ADD  1 g Intravenous Q24H Humberto Murphy  mL/hr at 10/05/24 0833 1 g at 10/05/24 0833    [COMPLETED] clonidine-EPINEPHrine-ropivacaine-ketorolac (CERTS) (Duraclon-Adrenalin-Naropin-Toradol) pain cocktail irrigation   Intra-articular Once (Intra-Op) Humberto Murphy MD   Given at 10/04/24 1211    [] sodium chloride 0.9 % IV bolus 100 mL  100 mL Intravenous Q30 Min PRN Humberto Murphy MD        And    [] albumin human (Albumin) 25% injection 25 g  25 g Intravenous PRN Dialysis Humberto Murphy MD        [COMPLETED] tranexamic acid in sodium chloride 0.7% (Cyklokapron) 1000 mg/100mL infusion premix 1,000 mg  1,000 mg Intravenous 30 Min Pre-Op Humberto Murphy MD   1,000 mg at 10/04/24 1149    [COMPLETED] potassium chloride (Klor-Con) 20 MEQ oral powder 20 mEq  20 mEq Oral Once Mirta Redman MD   20 mEq at 10/02/24 202    [COMPLETED] sodium chloride 0.9 % IV bolus 250 mL  250 mL Intravenous Once Nora Jones APRN   Stopped at 10/01/24 1150    [] sodium chloride 0.9 % IV bolus 100 mL  100 mL Intravenous Q30 Min PRN Moni Pugh MD        And    [] albumin human (Albumin) 25% injection 25 g  25 g Intravenous PRN Dialysis Moni Pugh MD        [] piperacillin-tazobactam (Zosyn) 3.375 g in dextrose 5% 100 mL IVPB-ADDV  3.375 g Intravenous Q12H Jimmie Landeros MD 25 mL/hr at 10/04/24 1900 3.375 g at 10/04/24 1900    [COMPLETED] iopamidol 76% (ISOVUE-370) injection for power injector  80 mL Intravenous ONCE PRN Radha Gabriel MD   80 mL at 24 1126    [COMPLETED] furosemide (Lasix) 10 mg/mL injection 40 mg  40 mg Intravenous Once King  Nick RICHEY MD   40 mg at 24 0607    [] furosemide (Lasix) 10 mg/mL injection             [COMPLETED] hydrALAzine (Apresoline) 20 mg/mL injection 10 mg  10 mg Intravenous Once Nick Moreau MD   10 mg at 24 0626    [] etomidate (Amidate) 2 mg/mL injection             [COMPLETED] propofol (Diprivan) 10 MG/ML injection        1,000 mg at 24 1436    [COMPLETED] piperacillin-tazobactam (Zosyn) 4.5 g in dextrose 5% 100 mL IVPB-ADDV  4.5 g Intravenous Once Nick Moreau  mL/hr at 24 0853 4.5 g at 24 0853    [] sodium chloride 0.9 % IV bolus 100 mL  100 mL Intravenous Q30 Min PRN José Callahan MD        [COMPLETED] nitroGLYCERIN (Nitrobid) 2 % ointment 1 inch  1 inch Topical Once Nick Moreau MD   1 inch at 24 0654    [COMPLETED] hydrALAzine (Apresoline) 20 mg/mL injection 10 mg  10 mg Intravenous Once Nick Moreau MD   10 mg at 24 0654    [COMPLETED] ondansetron (Zofran) 4 MG/2ML injection        4 mg at 24 1241    [COMPLETED] metoclopramide (Reglan) 5 mg/mL injection 5 mg  5 mg Intravenous Once Karla Irvin APRN   5 mg at 24 1300    [COMPLETED] norepinephrine (Levophed) 4 mg/250mL infusion premix        4,000 mcg at 24 1455    [COMPLETED] succinylcholine (Anectine) 20 MG/ML injection 70.6 mg  1 mg/kg Intravenous Once Atul Sheridan MD   70.6 mg at 24 1445    [COMPLETED] HYDROcodone-acetaminophen (Norco) 5-325 MG per tab 1 tablet  1 tablet Oral Once Vitor Kline MD   1 tablet at 24 1245    [COMPLETED] morphINE PF 4 MG/ML injection 4 mg  4 mg Intravenous Once Vitor Kline MD   4 mg at 24 1448    [COMPLETED] sodium chloride 0.9 % IV bolus 1,000 mL  1,000 mL Intravenous Once Vitor Kline MD 1,000 mL/hr at 24 1447 1,000 mL at 24 1447    [COMPLETED] ondansetron (Zofran) 4 MG/2ML injection 4 mg  4 mg Intravenous Once Vitor Kline MD   4 mg at 24 1448    [COMPLETED]  ceFAZolin (Ancef) 2g in 10mL IV syringe premix  2 g Intravenous 30 Min Pre-Op Humberto Murphy MD   2 g at 10/04/24 0915    [COMPLETED] heparin (Porcine) 5000 UNIT/ML injection 5,000 Units  5,000 Units Subcutaneous Once Humberto Murphy MD   5,000 Units at 24 1758    [COMPLETED] dilTIAZem (cardIZEM) 25 mg/5mL injection 20 mg  20 mg Intravenous Once Vitor Kline MD   20 mg at 24 1619     Current Outpatient Medications on File Prior to Encounter   Medication Sig Dispense Refill    acetaminophen 500 MG Oral Tab 1 tablet (500 mg total) by Per G Tube route in the morning and 1 tablet (500 mg total) before bedtime.      Gabapentin 300 MG/6ML Oral Solution 300 mg by Peg Tube route in the morning and 300 mg before bedtime. 450 mL 3    linaGLIPtin (TRADJENTA) 5 mg Oral Tab 1 tablet (5 mg total) by Per G Tube route daily. 90 tablet 3    atorvastatin 40 MG Oral Tab 1 tablet (40 mg total) by Per G Tube route nightly. 90 tablet 3    amiodarone 200 MG Oral Tab 1 tablet (200 mg total) by Per G Tube route daily. 90 tablet 3    carvedilol 25 MG Oral Tab 1 tablet (25 mg total) by Per G Tube route 2 (two) times daily. 180 tablet 3    B Complex-C-Folic Acid (RENAL MULTIVITAMIN FORMULA) Oral Tab 1 tablet by Per G Tube route daily. 90 tablet 3    clopidogrel 75 MG Oral Tab Take 1 tablet (75 mg total) by mouth daily. 90 tablet 3    albuterol (PROAIR HFA) 108 (90 Base) MCG/ACT Inhalation Aero Soln Inhale 2 puffs into the lungs every 4 (four) hours as needed for Wheezing. 3 each 3    fluticasone propionate 50 MCG/ACT Nasal Suspension 2 sprays by Nasal route daily. 48 g 3    [] amoxicillin clavulanate 250-125 MG Oral Tab 1 tablet (250 mg total) by Per G Tube route daily. Taking for swelling to left side of jaw      lansoprazole 30 MG Oral Tablet Delayed Release Dispersible 1 tablet (30 mg total) by Per G Tube route every morning before breakfast. 30 tablet 0    aspirin 81 MG Oral Tab EC Take 1 tablet (81 mg total) by  mouth daily. (Patient taking differently: Take 1 tablet (81 mg total) by mouth daily. Per G-tube) 90 tablet 3    cetirizine 10 MG Oral Tab Take 1 tablet (10 mg total) by mouth every other day.      polyethylene glycol, PEG 3350, 17 g Oral Powd Pack 17 g by Per G Tube route daily as needed (Constipation).      Insulin Lispro, 1 Unit Dial, (HUMALOG KWIKPEN) 100 UNIT/ML Subcutaneous Solution Pen-injector Take subcutaneously 4 times daily before tube feedings per sliding scale. Max total dose of 20 units. (Patient not taking: Reported on 2024) 6 mL 1    [] glucagon (GVOKE HYPOPEN 2-PACK) 1 MG/0.2ML Subcutaneous injection Inject 0.2 mL (1 mg total) into the skin once as needed for Low blood glucose. 1 each 0    Insulin Pen Needle 33G X 4 MM Does not apply Misc 1 each 4 (four) times daily. 200 each 1    albuterol (2.5 MG/3ML) 0.083% Inhalation Nebu Soln Take 3 mL (2.5 mg total) by nebulization every 4 (four) hours as needed for Wheezing or Shortness of Breath. (Patient not taking: Reported on 2024) 360 mL 3    HYDROcodone-acetaminophen 5-325 MG Oral Tab 1 tablet by Per G Tube route every 8 (eight) hours as needed. (Patient not taking: Reported on 2024) 90 tablet 0    Incontinence Supply Disposable (COMFORT PROTECT ADULT DIAPER/L) Does not apply Misc 1 Application daily. 30 each 3    Electronic Thermometer ([employer] DIGITAL THERMOMETER TEMPLE) Does not apply Misc Check temperature 1 each 0    insulin glargine (LANTUS SOLOSTAR) 100 UNIT/ML Subcutaneous Solution Pen-injector Inject 8 Units into the skin every morning. (Patient not taking: Reported on 2024) 15 mL 0    Insulin Pen Needle 32G X 4 MM Does not apply Misc Take as directed 200 each 3    Budesonide-Formoterol Fumarate (SYMBICORT) 160-4.5 MCG/ACT Inhalation Aerosol Inhale 2 puffs into the lungs 2 (two) times daily. (Patient not taking: Reported on 2024) 3 each 3    Starch-Maltodextrin (THICK-IT) Oral Powd Pack Use as directed (Patient not  taking: Reported on 12/6/2024) 200 each 3    albuterol (2.5 MG/3ML) 0.083% Inhalation Nebu Soln Take 3 mL (2.5 mg total) by nebulization in the morning and 3 mL (2.5 mg total) before bedtime. (Patient not taking: Reported on 12/3/2024)      lidocaine 4 % External Patch Place 1 patch onto the skin daily. (Patient not taking: Reported on 12/3/2024)      acetaminophen 500 MG Oral Tab Take 1 tablet (500 mg total) by mouth every 6 (six) hours as needed for Pain. (Patient not taking: Reported on 12/6/2024)      Cholecalciferol 125 MCG (5000 UT) Oral Tab Take 1 tablet (5,000 Units total) by mouth 2 (two) times daily. (Patient not taking: Reported on 12/3/2024)

## 2024-12-19 NOTE — CM/SW NOTE
Pt discussed in RN DC Rounds. NOEMY requested for DSC to initiate auth from therapy notes 12/18. DSC started auth, auth was approved 12/20-12/24. NOEMY updated MD. Noemy updated [facility] liaison, who requested HD flowsheet. HD flowsheet sent in aidin.     Plan: Pending medical clearance, DC to [facility], auth approved 12/20-12/24    NOEMY/MANDY to remain available for support and/or discharge planning.     Annette Shannon, MSW, LSW   x [phone]

## 2024-12-19 NOTE — CHRONIC PAIN
[facility]  Inpatient Pain Management Progress Note      Patient name: Ollie Hernández 79 year old male  : 1945  MRN: [id number]    Diagnosis:   1. Peripheral polyneuropathy    2. ESRD on hemodialysis (HCC)        Reason for Consult: LE pain    Current hospital day: Hospital Day: 14    Pain Scores:  3/10     Subjective:   The patient reports reduction of his pain down the legs and in the feet since receiving the LESI injection.   Pain medications have been stopped due to confusion and hypoxia.   His major complaint today is difficulty sleeping.   Currently on oxygen via [place]      History:  Past Medical History:    Anemia    Asthma (HCC)    Back problem    BPH (benign prostatic hyperplasia)    Calculus of kidney    Cataract    Coronary atherosclerosis    Diabetes (HCC)    ESRD (end stage renal disease) on dialysis ([facility])    Essential hypertension    High blood pressure    High cholesterol    History of blood transfusion    Hyperlipidemia    Neuropathy    hands and feet    KRAIG on CPAP    Renal disorder    Sleep apnea    Visual impairment    glasses    Vocal cord paralysis, unilateral partial     Past Surgical History:   Procedure Laterality Date    Appendectomy          Back surgery      Neck/back -     Capsule endoscopy - internal referral      Cataract extraction Right 2022    PHACOEMULSIFICATION WITH POSTERIOR CHAMBER INTRAOCULAR LENS, RIGHT EYE    Cataract extraction Left 2021    PHACOEMULSIFICATION WITH POSTERIOR CHAMBER INTRAOCULAR LENS, LEFT EYE    Cath bare metal stent (bms)      Cath drug eluting stent  2024    Successful IVUS guided PCI of the circumflex with a ABIMBOLA    Colonoscopy      Colonoscopy N/A 2021    Procedure: COLONOSCOPY;  Surgeon: Michael Gautam MD;  [facility]    Colonoscopy N/A 2024    Procedure: COLONOSCOPY;  Surgeon: Gabriel Saldana MD;  Location: [facility] ENDOSCOPY    Colonoscopy N/A 06/15/2024    Procedure: COLONOSCOPY;   Surgeon: Carlyn Storey MD;  Location: [facility] ENDOSCOPY    Colonoscopy N/A 07/31/2024    Procedure: COLONOSCOPY;  Surgeon: Gabriel Saldana MD;  Location: [facility] ENDOSCOPY    Dialysis procedure  02/17/2023    right internal jugular tunneled dialysis catheter placement.    Hand/finger surgery unlisted      Accidental trauma    Spine surgery procedure unlisted      Total hip arthroplasty Left 10/04/2024    Left anterior total hip arthroplasty    Upper gi endoscopy,diagnosis       Family History   Problem Relation Age of Onset    Cancer Father         Kidney    Breast Cancer Mother     Diabetes Mother     Diabetes Maternal Grandmother     Diabetes Maternal Grandfather     Heart Disorder Other         Uncle      reports that he quit smoking about 43 years ago. His smoking use included cigarettes. He started smoking about 61 years ago. He has a 17 pack-year smoking history. He has never used smokeless tobacco. He reports that he does not drink alcohol and does not use drugs.    Allergies:  Allergies[1]    Current Medications:  Scheduled Meds:   metoprolol tartrate  100 mg Oral 2x Daily(Beta Blocker)    bisacodyl  10 mg Oral Daily    nortriptyline  10 mg Per G Tube Nightly    simethicone  125 mg Per G Tube TID CC and HS    clopidogrel  75 mg Per G Tube Daily    metoprolol  5 mg Intravenous Once    acetaminophen  500 mg Oral q6h    ampicillin-sulbactam  1.5 g Intravenous q12h    insulin regular human  1-7 Units Subcutaneous 4 times per day    [Held by provider] gabapentin  300 mg Per G Tube BID    aspirin  81 mg Per G Tube Daily    lidocaine-menthol  2 patch Transdermal Daily    epoetin donna  10,000 Units Subcutaneous Once per day on Monday Wednesday Friday    amiodarone  200 mg Per G Tube Daily    atorvastatin  40 mg Per G Tube Nightly    fluticasone propionate  2 spray Nasal Daily    insulin degludec  5 Units Subcutaneous Nightly    lansoprazole  30 mg Per G Tube QAM AC    heparin  5,000 Units Subcutaneous Q8H [facility]     Continuous  Infusions:   dextrose 10%       PRN Meds:.  [START ON 12/21/2024] sodium chloride **AND** [START ON 12/21/2024] albumin human    sodium chloride **AND** albumin human    traMADol    ipratropium-albuterol    [Held by provider] LORazepam    heparin    lidocaine-prilocaine    dextrose 10%    lipase-protease-amylase (Lip-Prot-Amyl) **AND** sodium bicarbonate    metoprolol **OR** metoprolol    [Held by provider] HYDROmorphone **OR** [Held by provider] HYDROmorphone **OR** [Held by provider] HYDROmorphone    glucose **OR** glucose **OR** glucose-vitamin C **OR** dextrose **OR** glucose **OR** glucose **OR** glucose-vitamin C    acetaminophen **OR** [DISCONTINUED] HYDROcodone-acetaminophen **OR** [DISCONTINUED] HYDROcodone-acetaminophen    polyethylene glycol (PEG 3350)    sennosides    bisacodyl    Exam:Blood pressure 142/71, pulse 75, temperature 98.4 °F (36.9 °C), temperature source Oral, resp. rate 18, height 5' 4\" (1.626 m), weight 164 lb 0.4 oz (74.4 kg), SpO2 100%.    Imaging:     Lumbar MRI  CONCLUSION:      1. Multilevel degenerative changes of the lumbar spine, which are described in detail above and are similar when compared to 02/07/2023.   2. At L4-L5, there is severe canal stenosis, severe bilateral subarticular zone stenosis, and moderate to severe bilateral foraminal narrowing.   3. Redemonstrated postoperative changes from prior decompressive laminectomies at L2-L4.   4. Grade 1 anterolisthesis of L4 on L5.   5. No acute fracture or traumatic listhesis of the lumbar spine.   6. Multifocal abnormal increased T2/STIR signal involving the paraspinal musculature, which may reflect atrophy, denervation, or less likely reflect myositis.   7. Circumferential bladder wall thickening, which may be secondary to underdistention, cystitis, or chronic outlet obstruction.   8. Lesser incidental findings described above.          Assessment:  79 year old male with past medical history of T2DM, HTN, HLD, ESRD on HD MWF  via R AVF, CAD s/p PCI (5/2024), ALONDRA.pooja on AC, HFpEF, KRAIG, BPH, history of recurrent gastrointestinal bleeding, who presented with bilateral lower extremity pain.  Bilateral LE radicular pain due to spinal stenosis and coexisting diabetic neuropathy.   Imaging showing severe canal stenosis at L4-5    S/p LESI with good pain relief     Opioids stopped 2/2 hypoxia and confusion, resolved since     Issue sleeping       Plan:  - patient is doing well without opioids   -Continue lidocaine patch and nortiptyline   -Tylenol as needed  - consider low dose sleep aid, non benzodiazepine   - nothing further to offer will sign off        Total Time: 20 minutes         Lengthy discussion of risks, benefits, and alternative treatments were reviewed to patients satisfaction. All questions were answered. Patient agreeable to plan. Greater than 50% of the time was spent with counseling (nature of discussion centered around pain, therapy, and treatment options), face to face time, time spent reviewing data, obtaining patient information and discussing the care with the patients health care providers.         Romina Herrera, DO  Anesthesiology  Pain Medicine           [1]   Allergies  Allergen Reactions    Adhesive Tape OTHER (SEE COMMENTS)     Severe rashes    Dust Mite Extract RASH

## 2024-12-19 NOTE — CM/SW NOTE
Prior Authorization - Destination  Destination Type: Skilled nursing facility  Service Provider: [facility]  Payer Communication Destination Comments: [place] [id number]  Prior Authorization Status: Submitted/Pending  Prior Authorization Start Date: 12/12/24      Maria Isabel John DSC

## 2024-12-19 NOTE — CM/SW NOTE
"INPATIENT PROGRESS NOTES    PATIENT NAME: Vamsi Benson    MRN: [id number]  SERVICE DATE:  3/19/2024   SERVICE TIME:  1:00 PM    SIGNATURE: Aneudy Raymond MD      SUBJECTIVE  Afebrile  No events overnight       OBJECTIVE  PHYSICAL EXAM:   Patient Vitals for the past 24 hrs:   BP Temp Temp src Pulse Resp SpO2 Weight   03/19/24 1200 117/61 36.3 °C (97.3 °F) Temporal 63 18 98 % --   03/19/24 0800 142/77 36.4 °C (97.5 °F) Temporal 84 20 97 % --   03/19/24 0600 -- -- -- -- -- -- 86.2 kg (190 lb)   03/19/24 0400 127/71 36 °C (96.8 °F) Temporal 73 16 96 % --   03/19/24 0020 129/71 37.1 °C (98.8 °F) Temporal 68 18 97 % --   03/19/24 0000 105/73 37.3 °C (99.1 °F) Temporal 89 18 96 % --   03/18/24 2000 170/83 37 °C (98.6 °F) Temporal 73 18 97 % --   03/18/24 1600 145/79 36.5 °C (97.7 °F) -- 74 18 94 % --         Gen: NAD  Neck: symmetric, no mass  Cardiovascular: RRR  Respiratory: No distress   Extremities: Right hand is wrapped  Skin: Warm and dry.      Labs:  Lab Results   Component Value Date    WBC 6.5 03/19/2024    HGB 8.4 (L) 03/19/2024    HCT 26.2 (L) 03/19/2024    MCV 93 03/19/2024     (L) 03/19/2024     Lab Results   Component Value Date    GLUCOSE 123 (H) 03/19/2024    CALCIUM 7.9 (L) 03/19/2024     03/19/2024    K 3.7 03/19/2024    CO2 29 03/19/2024     03/19/2024    BUN 33 (H) 03/19/2024    CREATININE 1.90 (H) 03/19/2024   ESR: --No results found for: \"SEDRATE\"  Lab Results   Component Value Date    CRP 6.16 (H) 03/09/2024     Lab Results   Component Value Date    ALT <3 (L) 03/13/2024    AST 11 03/13/2024    ALKPHOS 50 03/13/2024    BILITOT 0.3 03/13/2024         DATA:   Diagnostic tests reviewed for today's visit:    Labs this admission reviewed  Imagings this admission reviewed  Cultures: Reviewed        ASSESSMENT :   -Recent right hand abscess with tenosynovitis status post I&D on 2/23 at [facility]  -Recent group A strep bacteremia  -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  Reported " Prior Authorization - Destination  Destination Type: Skilled nursing facility  Service Provider: [facility]  Payer Communication Destination Comments: [place] [id number]  Prior Authorization Status: Approved  Prior Authorization Start Date: 12/20/24 12/20 TO 12/24    Maria Isabel John DSC     receiving 2 doses of IM penicillin and received a third dose third dose on 3/11.   -Acute anemia  -Positive urine culture for Candida albicans without symptoms likely colonization  -History of A-fib, renal failure, hypertension     PLAN:  -Continue ceftriaxone 2 g IV every 24 hours through 3/27  -Labs reviewed, no worsening renal failure, or thrombocytopenia  -Repeat RPR in 3 to 6 months  -The patient can be discharged from ID standpoint    The plan was discussed with the primary team      Aneudy Raymond MD.   Infectious Diseases Attending

## 2024-12-19 NOTE — PLAN OF CARE
Problem: Patient Centered Care  Goal: Patient preferences are identified and integrated in the patient's plan of care  Description: Interventions:  - What would you like us to know as we care for you? I am from home with my wife  - Provide timely, complete, and accurate information to patient/family  - Incorporate patient and family knowledge, values, beliefs, and cultural backgrounds into the planning and delivery of care  - Encourage patient/family to participate in care and decision-making at the level they choose  - Honor patient and family perspectives and choices  Outcome: Progressing     Problem: Diabetes/Glucose Control  Goal: Glucose maintained within prescribed range  Description: INTERVENTIONS:  - Monitor Blood Glucose as ordered  - Assess for signs and symptoms of hyperglycemia and hypoglycemia  - Administer ordered medications to maintain glucose within target range  - Assess barriers to adequate nutritional intake and initiate nutrition consult as needed  - Instruct patient on self management of diabetes  Outcome: Progressing     Problem: Patient/Family Goals  Goal: Patient/Family Long Term Goal  Description: Patient's Long Term Goal: Discharge from the hospital    Interventions:  - Monitor vital signs  - Monitor appropriate labs  - Monitor blood glucose levels  - Pain management  - Administer medications per order  - Follow MD orders  - Diagnostics per order  - Update / inform patient and family on plan of care  - Discharge planning  - See additional Care Plan goals for specific interventions  Outcome: Progressing  Goal: Patient/Family Short Term Goal  Description: Patient's Short Term Goal: Improve bilateral feet pain    Interventions:   - Monitor vital signs  - Monitor appropriate labs  - Monitor blood glucose levels  - Pain management  - Administer medications per order  - Follow MD orders  - Diagnostics per order  - Update / inform patient and family on plan of care  - See additional Care Plan  goals for specific interventions  Outcome: Progressing     Problem: MUSCULOSKELETAL - ADULT  Goal: Return mobility to safest level of function  Description: INTERVENTIONS:  - Assess patient stability and activity tolerance for standing, transferring and ambulating w/ or w/o assistive devices  - Assist with transfers and ambulation using safe patient handling equipment as needed  - Ensure adequate protection for wounds/incisions during mobilization  - Obtain PT/OT consults as needed  - Advance activity as appropriate  - Communicate ordered activity level and limitations with patient/family  Outcome: Progressing     Problem: PAIN - ADULT  Goal: Verbalizes/displays adequate comfort level or patient's stated pain goal  Description: INTERVENTIONS:  - Encourage pt to monitor pain and request assistance  - Assess pain using appropriate pain scale  - Administer analgesics based on type and severity of pain and evaluate response  - Implement non-pharmacological measures as appropriate and evaluate response  - Consider cultural and social influences on pain and pain management  - Manage/alleviate anxiety  - Utilize distraction and/or relaxation techniques  - Monitor for opioid side effects  - Notify MD/LIP if interventions unsuccessful or patient reports new pain  - Anticipate increased pain with activity and pre-medicate as appropriate  Outcome: Progressing     Problem: RISK FOR INFECTION - ADULT  Goal: Absence of fever/infection during anticipated neutropenic period  Description: INTERVENTIONS  - Monitor WBC  - Administer growth factors as ordered  - Implement neutropenic guidelines  Outcome: Progressing     Problem: SAFETY ADULT - FALL  Goal: Free from fall injury  Description: INTERVENTIONS:  - Assess pt frequently for physical needs  - Identify cognitive and physical deficits and behaviors that affect risk of falls.  - [place] fall precautions as indicated by assessment.  - Educate pt/family on patient safety including  physical limitations  - Instruct pt to call for assistance with activity based on assessment  - Modify environment to reduce risk of injury  - Provide assistive devices as appropriate  - Consider OT/PT consult to assist with strengthening/mobility  - Encourage toileting schedule  Outcome: Progressing     Problem: DISCHARGE PLANNING  Goal: Discharge to home or other facility with appropriate resources  Description: INTERVENTIONS:  - Identify barriers to discharge w/pt and caregiver  - Include patient/family/discharge partner in discharge planning  - Arrange for needed discharge resources and transportation as appropriate  - Identify discharge learning needs (meds, wound care, etc)  - Arrange for interpreters to assist at discharge as needed  - Consider post-discharge preferences of patient/family/discharge partner  - Complete POLST form as appropriate  - Assess patient's ability to be responsible for managing their own health  - Refer to Case Management Department for coordinating discharge planning if the patient needs post-hospital services based on physician/LIP order or complex needs related to functional status, cognitive ability or social support system  Outcome: Progressing     Problem: METABOLIC/FLUID AND ELECTROLYTES - ADULT  Goal: Glucose maintained within prescribed range  Description: INTERVENTIONS:  - Monitor Blood Glucose as ordered  - Assess for signs and symptoms of hyperglycemia and hypoglycemia  - Administer ordered medications to maintain glucose within target range  - Assess barriers to adequate nutritional intake and initiate nutrition consult as needed  - Instruct patient on self management of diabetes  Outcome: Progressing  Goal: Electrolytes maintained within normal limits  Description: INTERVENTIONS:  - Monitor labs and rhythm and assess patient for signs and symptoms of electrolyte imbalances  - Administer electrolyte replacement as ordered  - Monitor response to electrolyte replacements,  including rhythm and repeat lab results as appropriate  - Fluid restriction as ordered  - Instruct patient on fluid and nutrition restrictions as appropriate  Outcome: Progressing  Goal: Hemodynamic stability and optimal renal function maintained  Description: INTERVENTIONS:  - Monitor labs and assess for signs and symptoms of volume excess or deficit  - Monitor intake, output and patient weight  - Monitor urine specific gravity, serum osmolarity and serum sodium as indicated or ordered  - Monitor response to interventions for patient's volume status, including labs, urine output, blood pressure (other measures as available)  - Encourage oral intake as appropriate  - Instruct patient on fluid and nutrition restrictions as appropriate  Outcome: Progressing

## 2024-12-20 ENCOUNTER — APPOINTMENT (OUTPATIENT)
Dept: GENERAL RADIOLOGY | Facility: HOSPITAL | Age: 79
End: 2024-12-20
Attending: INTERNAL MEDICINE
Payer: MEDICARE

## 2024-12-20 PROBLEM — M54.17 LUMBOSACRAL RADICULOPATHY: Status: ACTIVE | Noted: 2024-01-01

## 2024-12-20 PROBLEM — M48.062 SPINAL STENOSIS, LUMBAR REGION WITH NEUROGENIC CLAUDICATION: Status: ACTIVE | Noted: 2024-01-01

## 2024-12-20 PROBLEM — M54.17 LUMBOSACRAL RADICULOPATHY: Status: ACTIVE | Noted: 2024-12-20

## 2024-12-20 PROBLEM — M48.062 SPINAL STENOSIS, LUMBAR REGION WITH NEUROGENIC CLAUDICATION: Status: ACTIVE | Noted: 2024-12-20

## 2024-12-20 LAB
ALBUMIN SERPL-MCNC: 3.4 G/DL (ref 3.2–4.8)
ALBUMIN SERPL-MCNC: 3.7 G/DL (ref 3.2–4.8)
ALBUMIN/GLOB SERPL: 0.8 {RATIO} (ref 1–2)
ALP LIVER SERPL-CCNC: 236 U/L
ALT SERPL-CCNC: 125 U/L
ANION GAP SERPL CALC-SCNC: 8 MMOL/L (ref 0–18)
ANION GAP SERPL CALC-SCNC: 9 MMOL/L (ref 0–18)
AST SERPL-CCNC: 97 U/L (ref ?–34)
BASOPHILS # BLD AUTO: 0.06 X10(3) UL (ref 0–0.2)
BASOPHILS NFR BLD AUTO: 0.4 %
BILIRUB SERPL-MCNC: 0.4 MG/DL (ref 0.2–1.1)
BUN BLD-MCNC: 25 MG/DL (ref 9–23)
BUN BLD-MCNC: 37 MG/DL (ref 9–23)
BUN/CREAT SERPL: 13.4 (ref 10–20)
BUN/CREAT SERPL: 13.6 (ref 10–20)
CALCIUM BLD-MCNC: 10.2 MG/DL (ref 8.7–10.4)
CALCIUM BLD-MCNC: 9.6 MG/DL (ref 8.7–10.4)
CHLORIDE SERPL-SCNC: 100 MMOL/L (ref 98–112)
CHLORIDE SERPL-SCNC: 97 MMOL/L (ref 98–112)
CO2 SERPL-SCNC: 30 MMOL/L (ref 21–32)
CO2 SERPL-SCNC: 31 MMOL/L (ref 21–32)
CREAT BLD-MCNC: 1.87 MG/DL
CREAT BLD-MCNC: 2.73 MG/DL
DEPRECATED RDW RBC AUTO: 61.7 FL (ref 35.1–46.3)
EGFRCR SERPLBLD CKD-EPI 2021: 23 ML/MIN/1.73M2 (ref 60–?)
EGFRCR SERPLBLD CKD-EPI 2021: 37 ML/MIN/1.73M2 (ref 60–?)
EOSINOPHIL # BLD AUTO: 0.11 X10(3) UL (ref 0–0.7)
EOSINOPHIL NFR BLD AUTO: 0.8 %
ERYTHROCYTE [DISTWIDTH] IN BLOOD BY AUTOMATED COUNT: 17.8 % (ref 11–15)
GLOBULIN PLAS-MCNC: 4.1 G/DL (ref 2–3.5)
GLUCOSE BLD-MCNC: 138 MG/DL (ref 70–99)
GLUCOSE BLD-MCNC: 227 MG/DL (ref 70–99)
GLUCOSE BLDC GLUCOMTR-MCNC: 133 MG/DL (ref 70–99)
GLUCOSE BLDC GLUCOMTR-MCNC: 203 MG/DL (ref 70–99)
GLUCOSE BLDC GLUCOMTR-MCNC: 221 MG/DL (ref 70–99)
GLUCOSE BLDC GLUCOMTR-MCNC: 222 MG/DL (ref 70–99)
GLUCOSE BLDC GLUCOMTR-MCNC: 74 MG/DL (ref 70–99)
HCT VFR BLD AUTO: 33.5 %
HGB BLD-MCNC: 10.7 G/DL
IMM GRANULOCYTES # BLD AUTO: 0.18 X10(3) UL (ref 0–1)
IMM GRANULOCYTES NFR BLD: 1.3 %
LYMPHOCYTES # BLD AUTO: 1.29 X10(3) UL (ref 1–4)
LYMPHOCYTES NFR BLD AUTO: 9.3 %
MCH RBC QN AUTO: 30.8 PG (ref 26–34)
MCHC RBC AUTO-ENTMCNC: 31.9 G/DL (ref 31–37)
MCV RBC AUTO: 96.5 FL
MONOCYTES # BLD AUTO: 1.06 X10(3) UL (ref 0.1–1)
MONOCYTES NFR BLD AUTO: 7.7 %
NEUTROPHILS # BLD AUTO: 11.14 X10 (3) UL (ref 1.5–7.7)
NEUTROPHILS # BLD AUTO: 11.14 X10(3) UL (ref 1.5–7.7)
NEUTROPHILS NFR BLD AUTO: 80.5 %
OSMOLALITY SERPL CALC.SUM OF ELEC: 295 MOSM/KG (ref 275–295)
OSMOLALITY SERPL CALC.SUM OF ELEC: 298 MOSM/KG (ref 275–295)
PHOSPHATE SERPL-MCNC: 2.1 MG/DL (ref 2.4–5.1)
PLATELET # BLD AUTO: 283 10(3)UL (ref 150–450)
POTASSIUM SERPL-SCNC: 3.4 MMOL/L (ref 3.5–5.1)
POTASSIUM SERPL-SCNC: 4.1 MMOL/L (ref 3.5–5.1)
PROT SERPL-MCNC: 7.5 G/DL (ref 5.7–8.2)
RBC # BLD AUTO: 3.47 X10(6)UL
SODIUM SERPL-SCNC: 137 MMOL/L (ref 136–145)
SODIUM SERPL-SCNC: 138 MMOL/L (ref 136–145)
WBC # BLD AUTO: 13.8 X10(3) UL (ref 4–11)

## 2024-12-20 PROCEDURE — 99233 SBSQ HOSP IP/OBS HIGH 50: CPT | Performed by: HOSPITALIST

## 2024-12-20 PROCEDURE — 71045 X-RAY EXAM CHEST 1 VIEW: CPT | Performed by: INTERNAL MEDICINE

## 2024-12-20 PROCEDURE — 99233 SBSQ HOSP IP/OBS HIGH 50: CPT | Performed by: INTERNAL MEDICINE

## 2024-12-20 NOTE — PLAN OF CARE
Problem: Patient Centered Care  Goal: Patient preferences are identified and integrated in the patient's plan of care  Description: Interventions:  - What would you like us to know as we care for you? I am from home with my wife  - Provide timely, complete, and accurate information to patient/family  - Incorporate patient and family knowledge, values, beliefs, and cultural backgrounds into the planning and delivery of care  - Encourage patient/family to participate in care and decision-making at the level they choose  - Honor patient and family perspectives and choices  Outcome: Progressing     Problem: MUSCULOSKELETAL - ADULT  Goal: Return mobility to safest level of function  Description: INTERVENTIONS:  - Assess patient stability and activity tolerance for standing, transferring and ambulating w/ or w/o assistive devices  - Assist with transfers and ambulation using safe patient handling equipment as needed  - Ensure adequate protection for wounds/incisions during mobilization  - Obtain PT/OT consults as needed  - Advance activity as appropriate  - Communicate ordered activity level and limitations with patient/family  Outcome: Progressing     Problem: PAIN - ADULT  Goal: Verbalizes/displays adequate comfort level or patient's stated pain goal  Description: INTERVENTIONS:  - Encourage pt to monitor pain and request assistance  - Assess pain using appropriate pain scale  - Administer analgesics based on type and severity of pain and evaluate response  - Implement non-pharmacological measures as appropriate and evaluate response  - Consider cultural and social influences on pain and pain management  - Manage/alleviate anxiety  - Utilize distraction and/or relaxation techniques  - Monitor for opioid side effects  - Notify MD/LIP if interventions unsuccessful or patient reports new pain  - Anticipate increased pain with activity and pre-medicate as appropriate  Outcome: Progressing     Problem: SAFETY ADULT -  FALL  Goal: Free from fall injury  Description: INTERVENTIONS:  - Assess pt frequently for physical needs  - Identify cognitive and physical deficits and behaviors that affect risk of falls.  - [place] fall precautions as indicated by assessment.  - Educate pt/family on patient safety including physical limitations  - Instruct pt to call for assistance with activity based on assessment  - Modify environment to reduce risk of injury  - Provide assistive devices as appropriate  - Consider OT/PT consult to assist with strengthening/mobility  - Encourage toileting schedule  Outcome: Progressing     This RN called and scheduled dialysis for tomorrow AM.

## 2024-12-20 NOTE — PLAN OF CARE
Problem: Patient Centered Care  Goal: Patient preferences are identified and integrated in the patient's plan of care  Description: Interventions:  - What would you like us to know as we care for you? I am from home with my wife  - Provide timely, complete, and accurate information to patient/family  - Incorporate patient and family knowledge, values, beliefs, and cultural backgrounds into the planning and delivery of care  - Encourage patient/family to participate in care and decision-making at the level they choose  - Honor patient and family perspectives and choices  Outcome: Progressing     Problem: Diabetes/Glucose Control  Goal: Glucose maintained within prescribed range  Description: INTERVENTIONS:  - Monitor Blood Glucose as ordered  - Assess for signs and symptoms of hyperglycemia and hypoglycemia  - Administer ordered medications to maintain glucose within target range  - Assess barriers to adequate nutritional intake and initiate nutrition consult as needed  - Instruct patient on self management of diabetes  Outcome: Progressing     Problem: Patient/Family Goals  Goal: Patient/Family Long Term Goal  Description: Patient's Long Term Goal: Discharge from the hospital    Interventions:  - Monitor vital signs  - Monitor appropriate labs  - Monitor blood glucose levels  - Pain management  - Administer medications per order  - Follow MD orders  - Diagnostics per order  - Update / inform patient and family on plan of care  - Discharge planning  - See additional Care Plan goals for specific interventions  Outcome: Progressing  Goal: Patient/Family Short Term Goal  Description: Patient's Short Term Goal: Improve bilateral feet pain    Interventions:   - Monitor vital signs  - Monitor appropriate labs  - Monitor blood glucose levels  - Pain management  - Administer medications per order  - Follow MD orders  - Diagnostics per order  - Update / inform patient and family on plan of care  - See additional Care Plan  goals for specific interventions  Outcome: Progressing     Problem: MUSCULOSKELETAL - ADULT  Goal: Return mobility to safest level of function  Description: INTERVENTIONS:  - Assess patient stability and activity tolerance for standing, transferring and ambulating w/ or w/o assistive devices  - Assist with transfers and ambulation using safe patient handling equipment as needed  - Ensure adequate protection for wounds/incisions during mobilization  - Obtain PT/OT consults as needed  - Advance activity as appropriate  - Communicate ordered activity level and limitations with patient/family  Outcome: Progressing     Problem: PAIN - ADULT  Goal: Verbalizes/displays adequate comfort level or patient's stated pain goal  Description: INTERVENTIONS:  - Encourage pt to monitor pain and request assistance  - Assess pain using appropriate pain scale  - Administer analgesics based on type and severity of pain and evaluate response  - Implement non-pharmacological measures as appropriate and evaluate response  - Consider cultural and social influences on pain and pain management  - Manage/alleviate anxiety  - Utilize distraction and/or relaxation techniques  - Monitor for opioid side effects  - Notify MD/LIP if interventions unsuccessful or patient reports new pain  - Anticipate increased pain with activity and pre-medicate as appropriate  Outcome: Progressing     Problem: RISK FOR INFECTION - ADULT  Goal: Absence of fever/infection during anticipated neutropenic period  Description: INTERVENTIONS  - Monitor WBC  - Administer growth factors as ordered  - Implement neutropenic guidelines  Outcome: Progressing     Problem: SAFETY ADULT - FALL  Goal: Free from fall injury  Description: INTERVENTIONS:  - Assess pt frequently for physical needs  - Identify cognitive and physical deficits and behaviors that affect risk of falls.  - [place] fall precautions as indicated by assessment.  - Educate pt/family on patient safety including  physical limitations  - Instruct pt to call for assistance with activity based on assessment  - Modify environment to reduce risk of injury  - Provide assistive devices as appropriate  - Consider OT/PT consult to assist with strengthening/mobility  - Encourage toileting schedule  Outcome: Progressing     Problem: DISCHARGE PLANNING  Goal: Discharge to home or other facility with appropriate resources  Description: INTERVENTIONS:  - Identify barriers to discharge w/pt and caregiver  - Include patient/family/discharge partner in discharge planning  - Arrange for needed discharge resources and transportation as appropriate  - Identify discharge learning needs (meds, wound care, etc)  - Arrange for interpreters to assist at discharge as needed  - Consider post-discharge preferences of patient/family/discharge partner  - Complete POLST form as appropriate  - Assess patient's ability to be responsible for managing their own health  - Refer to Case Management Department for coordinating discharge planning if the patient needs post-hospital services based on physician/LIP order or complex needs related to functional status, cognitive ability or social support system  Outcome: Progressing     Problem: METABOLIC/FLUID AND ELECTROLYTES - ADULT  Goal: Glucose maintained within prescribed range  Description: INTERVENTIONS:  - Monitor Blood Glucose as ordered  - Assess for signs and symptoms of hyperglycemia and hypoglycemia  - Administer ordered medications to maintain glucose within target range  - Assess barriers to adequate nutritional intake and initiate nutrition consult as needed  - Instruct patient on self management of diabetes  Outcome: Progressing  Goal: Electrolytes maintained within normal limits  Description: INTERVENTIONS:  - Monitor labs and rhythm and assess patient for signs and symptoms of electrolyte imbalances  - Administer electrolyte replacement as ordered  - Monitor response to electrolyte replacements,  including rhythm and repeat lab results as appropriate  - Fluid restriction as ordered  - Instruct patient on fluid and nutrition restrictions as appropriate  Outcome: Progressing  Goal: Hemodynamic stability and optimal renal function maintained  Description: INTERVENTIONS:  - Monitor labs and assess for signs and symptoms of volume excess or deficit  - Monitor intake, output and patient weight  - Monitor urine specific gravity, serum osmolarity and serum sodium as indicated or ordered  - Monitor response to interventions for patient's volume status, including labs, urine output, blood pressure (other measures as available)  - Encourage oral intake as appropriate  - Instruct patient on fluid and nutrition restrictions as appropriate  Outcome: Progressing

## 2024-12-20 NOTE — PROGRESS NOTES
Cardiology Progress Note  [facility]    Ollie Hernández Patient Status:  Inpatient    1945 MRN [id number]   Location [facility] 5SW/SE Attending Lizbeth Rey MD   Hosp Day # 14 PCP Wili Parmar MD     Primary Cardiologist: Dr. Phelan    Reason for Consultation:  Afib    Subjective:  Doing well. No CV complaints. Getting dialysis this morning. He has paroxysmal episodes of afib with RVR, which self-terminate. He went back into afib during dialysis.     Assessment/Plan:  Afib s/p Watchman 24 (24mm Watchman FLX ). AF also likely precipitated by volume overload and possible infection.  CAD s/p PCI LCX with [place] ABIMBOLA x 1 (24)   HFpEF  ESRD on iHD  T2DM  KRAIG  HTN  Polyneuropathy, needing MRI    - Restart DAPT when able.  - metoprolol to 100mg BID. HR okay.  - IV metoprolol 5mg prn if needed during dialysis if rates > 130 bpm.  - continue amio 200mg daily. If patient continues to have episodes of RVR, can increase to amio 200mg TID. Overall, his episodes of RVR are self-limited and he is asymptomatic.  - TTE reviewed. RVSP 62 mmHg.   - RVSP 62 mmHg and right pleural effusion- recommend further dialysis and challenge dry weight.  - Tele  - Will continue to follow.    History:  Past Medical History:    Anemia    Asthma (HCC)    Back problem    BPH (benign prostatic hyperplasia)    Calculus of kidney    Cataract    Coronary atherosclerosis    Diabetes (HCC)    ESRD (end stage renal disease) on dialysis (HCC)    Essential hypertension    High blood pressure    High cholesterol    History of blood transfusion    Hyperlipidemia    Neuropathy    hands and feet    KRAIG on CPAP    Renal disorder    Sleep apnea    Visual impairment    glasses    Vocal cord paralysis, unilateral partial     Past Surgical History:   Procedure Laterality Date    Appendectomy          Back surgery      Neck/back -     Capsule endoscopy - internal referral      Cataract extraction Right 2022     PHACOEMULSIFICATION WITH POSTERIOR CHAMBER INTRAOCULAR LENS, RIGHT EYE    Cataract extraction Left 12/21/2021    PHACOEMULSIFICATION WITH POSTERIOR CHAMBER INTRAOCULAR LENS, LEFT EYE    Cath bare metal stent (bms)      Cath drug eluting stent  05/21/2024    Successful IVUS guided PCI of the circumflex with a ABIMBOLA    Colonoscopy      Colonoscopy N/A 01/25/2021    Procedure: COLONOSCOPY;  Surgeon: Michael Gautam MD;  Location: [facility] ENDO    Colonoscopy N/A 06/03/2024    Procedure: COLONOSCOPY;  Surgeon: Gabriel Saldana MD;  Location: [facility] ENDOSCOPY    Colonoscopy N/A 06/15/2024    Procedure: COLONOSCOPY;  Surgeon: Carlyn Storey MD;  Location: [facility] ENDOSCOPY    Colonoscopy N/A 07/31/2024    Procedure: COLONOSCOPY;  Surgeon: Gabriel Saldana MD;  Location: [facility] ENDOSCOPY    Dialysis procedure  02/17/2023    right internal jugular tunneled dialysis catheter placement.    Hand/finger surgery unlisted      Accidental trauma    Spine surgery procedure unlisted      Total hip arthroplasty Left 10/04/2024    Left anterior total hip arthroplasty    Upper gi endoscopy,diagnosis       Family History   Problem Relation Age of Onset    Cancer Father         Kidney    Breast Cancer Mother     Diabetes Mother     Diabetes Maternal Grandmother     Diabetes Maternal Grandfather     Heart Disorder Other         Uncle      reports that he quit smoking about 43 years ago. His smoking use included cigarettes. He started smoking about 61 years ago. He has a 17 pack-year smoking history. He has never used smokeless tobacco. He reports that he does not drink alcohol and does not use drugs.    Allergies:  Allergies[1]    Medications:  Medications Ordered Prior to Encounter[2]    Review of Systems:  As above, otherwise negative.      Physical Exam:  Blood pressure 155/58, pulse 80, temperature 98.5 °F (36.9 °C), temperature source Oral, resp. rate 16, height 5' 4\" (1.626 m), weight 164 lb 0.4 oz (74.4 kg), SpO2 97%.  Wt Readings from Last 3  Encounters:   12/19/24 164 lb 0.4 oz (74.4 kg)   11/30/24 145 lb (65.8 kg)   10/22/24 132 lb 14.4 oz (60.3 kg)       General: awake, alert, oriented x 3, no acute distress  HEENT: at/nc, perrl, eomi  Neck: No JVD, carotids 2+ no bruits.  Cardiac: tachy, irregular S1, S2 normal, no rub or gallop. Soft grade ii/vi systolic murmur  Lungs: Clear without wheezes, rales, rhonchi or dullness.  Normal excursions and effort.  Abdomen: Soft, non-tender, non-distended, normal bowel sounds   Extremities: Without clubbing, cyanosis or edema.  Peripheral pulses are 2+.  Neurologic: Alert and oriented, normal affect.  Psych: normal mood and affect  Skin: Warm and dry.       Laboratories and Data:  Diagnostics:      Labs:   CBC:    Lab Results   Component Value Date    WBC 13.8 (H) 12/20/2024    WBC 13.5 (H) 12/19/2024    WBC 9.5 12/18/2024     Lab Results   Component Value Date    HGB 10.7 (L) 12/20/2024    HGB 10.0 (L) 12/19/2024    HGB 10.0 (L) 12/18/2024      Lab Results   Component Value Date    .0 12/20/2024    .0 12/19/2024    .0 12/18/2024     BMP:   No results found for: \"GLUCOSE\"  Lab Results   Component Value Date    K 4.1 12/20/2024    K 4.4 12/19/2024    K 2.8 (LL) 12/18/2024    K 2.8 (LL) 12/18/2024     Lab Results   Component Value Date    BUN 37 (H) 12/20/2024    BUN 55 (H) 12/19/2024    BUN 30 (H) 12/18/2024    BUN 30 (H) 12/18/2024     Lab Results   Component Value Date    CREATSERUM 2.73 (H) 12/20/2024    CREATSERUM 3.69 (H) 12/19/2024    CREATSERUM 2.09 (H) 12/18/2024    CREATSERUM 2.09 (H) 12/18/2024     Cholesterol:     Lab Results   Component Value Date    CHOLEST 179 09/29/2024    CHOLEST 146 07/31/2024    CHOLEST 153 07/04/2024     Lab Results   Component Value Date    HDL 41 09/29/2024    HDL 29 (L) 07/31/2024    HDL 41 07/04/2024     Lab Results   Component Value Date    TRIG 259 (H) 10/17/2024    TRIG 160 (H) 10/04/2024    TRIG 206 (H) 10/02/2024     Lab Results   Component Value  Date     (H) 2024    LDL 93 2024     (H) 2024     Lab Results   Component Value Date    AST 32 2024    AST 42 (H) 2024    AST 71 (H) 2024     Lab Results   Component Value Date    ALT 29 2024    ALT 38 2024    ALT 57 (H) 2024           Luis Orozco MD  Interventional Cardiology         [1]   Allergies  Allergen Reactions    Adhesive Tape OTHER (SEE COMMENTS)     Severe rashes    Dust Mite Extract RASH   [2]   Current Facility-Administered Medications on File Prior to Encounter   Medication Dose Route Frequency Provider Last Rate Last Admin    [COMPLETED] acetaminophen (Tylenol) 160 MG/5ML oral liquid 500 mg  500 mg Per G Tube Once Gabriela Allen MD   500 mg at 24 0209    [COMPLETED] gabapentin (Neurontin) 250 MG/5ML oral solution 300 mg  300 mg Per G Tube Once Gabriela Allen MD   300 mg at 24 0440    [COMPLETED] ceFAZolin (Ancef) 2g in 10mL IV syringe premix  2 g Intravenous Once Yash Sheppard MD   2 g at 10/19/24 1036    [] adult 3 in 1 TPN   Intravenous Continuous TPN Pranay Michel MD   Paused at 10/19/24 0250    [COMPLETED] sodium chloride 0.9 % IV bolus 250 mL  250 mL Intravenous Once Lorelei Mata  mL/hr at 10/17/24 0023 250 mL at 10/17/24 0023    [] adult 3 in 1 TPN   Intravenous Continuous TPN Pranay Michel MD 50 mL/hr at 10/17/24 2233 New Bag at 10/17/24 2233    [COMPLETED] dilTIAZem (cardIZEM) 25 mg/5mL injection 10 mg  10 mg Intravenous Once Pranay Michel MD   10 mg at 10/17/24 1330    [COMPLETED] sodium chloride 0.9 % IV bolus 250 mL  250 mL Intravenous Once Pranay Michel  mL/hr at 10/17/24 1700 250 mL at 10/17/24 1700    [COMPLETED] amiodarone (Cordarone) 150 mg in dextrose 5% 100 mL IV bolus  150 mg Intravenous Once Emerson Julio  mL/hr at 10/17/24 1805 150 mg at 10/17/24 1805    [COMPLETED] digoxin (Lanoxin) 250 MCG/ML injection 250 mcg  250 mcg Intravenous Once Zhane  MD Herman   250 mcg at 10/17/24 1750    [] sodium chloride 0.9 % IV bolus 100 mL  100 mL Intravenous Q30 Min PRN José Callahan MD        And    [] albumin human (Albumin) 25% injection 25 g  25 g Intravenous PRN Dialysis José Callahan MD        [] dextrose 5%-sodium chloride 0.45% infusion   Intravenous Continuous Lorelei Mata MD 50 mL/hr at 10/16/24 0038 New Bag at 10/16/24 0038    [] adult 3 in 1 TPN   Intravenous Continuous TPN Pranay Michel MD 50 mL/hr at 10/16/24 2118 New Bag at 10/16/24 2118    [COMPLETED] metoprolol (Lopressor) 5 mg/5mL injection 5 mg  5 mg Intravenous Once Herman Phelan MD   5 mg at 10/16/24 2307    [] sodium chloride 0.9 % IV bolus 100 mL  100 mL Intravenous Q30 Min PRN José Callahan MD        And    [] albumin human (Albumin) 25% injection 25 g  25 g Intravenous PRN Dialysis José Callahan MD        [] sodium chloride 0.9 % IV bolus 100 mL  100 mL Intravenous Q30 Min PRN Walker Klein MD        And    [] albumin human (Albumin) 25% injection 25 g  25 g Intravenous PRN Dialysis Walker Klein MD        [COMPLETED] metoprolol (Lopressor) 5 mg/5mL injection 5 mg  5 mg Intravenous Once Radha Dunham MD   5 mg at 10/12/24 0304    [COMPLETED] methylPREDNISolone sodium succinate (Solu-MEDROL) injection 40 mg  40 mg Intravenous BID Jayjay Mijares MD   40 mg at 10/13/24 0919    [] sodium chloride 0.9 % IV bolus 100 mL  100 mL Intravenous Q30 Min PRN Walker Klein MD        And    [] albumin human (Albumin) 25% injection 25 g  25 g Intravenous PRN Dialysis Walker Klein MD   25 g at 10/11/24 1900    [COMPLETED] methylPREDNISolone sodium succinate (Solu-MEDROL) injection 60 mg  60 mg Intravenous Once Jimmie Landeors MD   60 mg at 10/09/24 0148    [COMPLETED] EPINEPHrine-racemic (S-2) 2.25 % nebulizer solution 0.5 mL  0.5 mL Nebulization Once Jimmie Landeros MD   0.5 mL at  10/09/24 0240    [] sodium chloride 0.9 % IV bolus 100 mL  100 mL Intravenous Q30 Min PRN Moni Pugh MD        And    [] albumin human (Albumin) 25% injection 25 g  25 g Intravenous PRN Dialysis Moni Pugh MD        [COMPLETED] methylPREDNISolone sodium succinate (Solu-MEDROL) injection 125 mg  125 mg Intravenous Once Gordon Borden MD   125 mg at 10/06/24 1823    [COMPLETED] EPINEPHrine-racemic (S-2) 2.25 % nebulizer solution 0.5 mL  0.5 mL Nebulization Once Ollie Santos MD   0.5 mL at 10/06/24 1812    [] methylPREDNISolone sodium succinate (Solu-MEDROL) 125 MG injection             [COMPLETED] potassium chloride (Klor-Con) 20 MEQ oral powder 20 mEq  20 mEq Oral Once Moni Pugh MD   20 mEq at 10/05/24 0556    [COMPLETED] fluconazole (Diflucan) tab 200 mg  200 mg Oral Once Lizbeth Rey MD   200 mg at 10/05/24 1610    [COMPLETED] potassium chloride (Klor-Con) 20 MEQ oral powder 10 mEq  10 mEq Oral Once Moni Pugh MD   10 mEq at 10/04/24 0736    [] ondansetron (Zofran) 4 MG/2ML injection 4 mg  4 mg Intravenous Once PRN Talha Gregg MD        [] prochlorperazine (Compazine) 10 MG/2ML injection 5 mg  5 mg Intravenous Once PRN Talha Gregg MD        [] haloperidol lactate (Haldol) 5 MG/ML injection 0.25 mg  0.25 mg Intravenous Once PRN Talha Gregg MD        [] fentaNYL (Sublimaze) 50 mcg/mL injection 25 mcg  25 mcg Intravenous Q5 Min PRN Talha Gregg MD   25 mcg at 10/04/24 1411    [] fentaNYL (Sublimaze) 50 mcg/mL injection 50 mcg  50 mcg Intravenous Q5 Min PRN Talha Gregg MD        [] HYDROmorphone (Dilaudid) 1 MG/ML injection 0.2 mg  0.2 mg Intravenous Q15 Min PRN Talha Gregg MD        [] HYDROmorphone (Dilaudid) 1 MG/ML injection 0.2 mg  0.2 mg Intravenous Q15 Min PRN Talha Gregg MD        [] HYDROmorphone (Dilaudid) 1 MG/ML injection 0.2 mg  0.2 mg Intravenous Q15 Min PRN Talha Gregg,  MD        [] atropine 0.1 MG/ML injection 0.5 mg  0.5 mg Intravenous PRN Talha Gregg MD        [] naloxone (Narcan) 0.4 MG/ML injection 0.08 mg  0.08 mg Intravenous PRN Talha Gregg MD        [] sodium chloride 0.9 % IV bolus 500 mL  500 mL Intravenous Once PRN Humberto Murphy MD        [] diphenhydrAMINE (Benadryl) 50 mg/mL  injection 25 mg  25 mg Intravenous Once PRN Humberto Murphy MD        [COMPLETED] ceFAZolin (Ancef) 1 g in dextrose 5% 100mL IVPB-ADD  1 g Intravenous Q24H Humberto Murphy  mL/hr at 10/05/24 0833 1 g at 10/05/24 0833    [COMPLETED] clonidine-EPINEPHrine-ropivacaine-ketorolac (CERTS) (Duraclon-Adrenalin-Naropin-Toradol) pain cocktail irrigation   Intra-articular Once (Intra-Op) Humberto Murphy MD   Given at 10/04/24 1211    [] sodium chloride 0.9 % IV bolus 100 mL  100 mL Intravenous Q30 Min PRN Humberto Murphy MD        And    [] albumin human (Albumin) 25% injection 25 g  25 g Intravenous PRN Dialysis Humberto Murphy MD        [COMPLETED] tranexamic acid in sodium chloride 0.7% (Cyklokapron) 1000 mg/100mL infusion premix 1,000 mg  1,000 mg Intravenous 30 Min Pre-Op Humberto Murphy MD   1,000 mg at 10/04/24 1149    [COMPLETED] potassium chloride (Klor-Con) 20 MEQ oral powder 20 mEq  20 mEq Oral Once Mirta Redman MD   20 mEq at 10/02/24 2024    [COMPLETED] sodium chloride 0.9 % IV bolus 250 mL  250 mL Intravenous Once Nora Jones APRN   Stopped at 10/01/24 1150    [] sodium chloride 0.9 % IV bolus 100 mL  100 mL Intravenous Q30 Min PRN Moni Pugh MD        And    [] albumin human (Albumin) 25% injection 25 g  25 g Intravenous PRN Dialysis Moni Pugh MD        [] piperacillin-tazobactam (Zosyn) 3.375 g in dextrose 5% 100 mL IVPB-ADDV  3.375 g Intravenous Q12H Jimmie Landeros MD 25 mL/hr at 10/04/24 1900 3.375 g at 10/04/24 1900    [COMPLETED] iopamidol 76% (ISOVUE-370) injection for power  injector  80 mL Intravenous ONCE PRN Radha Gbariel MD   80 mL at 24 1126    [COMPLETED] furosemide (Lasix) 10 mg/mL injection 40 mg  40 mg Intravenous Once Nick Moreau MD   40 mg at 24 0607    [] furosemide (Lasix) 10 mg/mL injection             [COMPLETED] hydrALAzine (Apresoline) 20 mg/mL injection 10 mg  10 mg Intravenous Once Nick Moreau MD   10 mg at 24 0626    [] etomidate (Amidate) 2 mg/mL injection             [COMPLETED] propofol (Diprivan) 10 MG/ML injection        1,000 mg at 24 1436    [COMPLETED] piperacillin-tazobactam (Zosyn) 4.5 g in dextrose 5% 100 mL IVPB-ADDV  4.5 g Intravenous Once Nick Moreau  mL/hr at 24 0853 4.5 g at 24 0853    [] sodium chloride 0.9 % IV bolus 100 mL  100 mL Intravenous Q30 Min PRN José Callahan MD        [COMPLETED] nitroGLYCERIN (Nitrobid) 2 % ointment 1 inch  1 inch Topical Once Nick Moreau MD   1 inch at 24 0654    [COMPLETED] hydrALAzine (Apresoline) 20 mg/mL injection 10 mg  10 mg Intravenous Once Nick Moreau MD   10 mg at 24 0654    [COMPLETED] ondansetron (Zofran) 4 MG/2ML injection        4 mg at 24 1241    [COMPLETED] metoclopramide (Reglan) 5 mg/mL injection 5 mg  5 mg Intravenous Once Karla Irvin APRN   5 mg at 24 1300    [COMPLETED] norepinephrine (Levophed) 4 mg/250mL infusion premix        4,000 mcg at 24 1455    [COMPLETED] succinylcholine (Anectine) 20 MG/ML injection 70.6 mg  1 mg/kg Intravenous Once Atul Sheridan MD   70.6 mg at 24 1445    [COMPLETED] HYDROcodone-acetaminophen (Norco) 5-325 MG per tab 1 tablet  1 tablet Oral Once Vitor Kline MD   1 tablet at 24 1245    [COMPLETED] morphINE PF 4 MG/ML injection 4 mg  4 mg Intravenous Once Vitor Kline MD   4 mg at 24 1448    [COMPLETED] sodium chloride 0.9 % IV bolus 1,000 mL  1,000 mL Intravenous Once Vitor Kline MD 1,000 mL/hr at 24 1447 1,000  mL at 24 1447    [COMPLETED] ondansetron (Zofran) 4 MG/2ML injection 4 mg  4 mg Intravenous Once Vitor Kline MD   4 mg at 24 1448    [COMPLETED] ceFAZolin (Ancef) 2g in 10mL IV syringe premix  2 g Intravenous 30 Min Pre-Op Humberto Murphy MD   2 g at 10/04/24 0915    [COMPLETED] heparin (Porcine) 5000 UNIT/ML injection 5,000 Units  5,000 Units Subcutaneous Once Humberto Murphy MD   5,000 Units at 24 1758    [COMPLETED] dilTIAZem (cardIZEM) 25 mg/5mL injection 20 mg  20 mg Intravenous Once Vitor Kline MD   20 mg at 24 1619     Current Outpatient Medications on File Prior to Encounter   Medication Sig Dispense Refill    acetaminophen 500 MG Oral Tab 1 tablet (500 mg total) by Per G Tube route in the morning and 1 tablet (500 mg total) before bedtime.      Gabapentin 300 MG/6ML Oral Solution 300 mg by Peg Tube route in the morning and 300 mg before bedtime. 450 mL 3    linaGLIPtin (TRADJENTA) 5 mg Oral Tab 1 tablet (5 mg total) by Per G Tube route daily. 90 tablet 3    atorvastatin 40 MG Oral Tab 1 tablet (40 mg total) by Per G Tube route nightly. 90 tablet 3    amiodarone 200 MG Oral Tab 1 tablet (200 mg total) by Per G Tube route daily. 90 tablet 3    carvedilol 25 MG Oral Tab 1 tablet (25 mg total) by Per G Tube route 2 (two) times daily. 180 tablet 3    B Complex-C-Folic Acid (RENAL MULTIVITAMIN FORMULA) Oral Tab 1 tablet by Per G Tube route daily. 90 tablet 3    clopidogrel 75 MG Oral Tab Take 1 tablet (75 mg total) by mouth daily. 90 tablet 3    albuterol (PROAIR HFA) 108 (90 Base) MCG/ACT Inhalation Aero Soln Inhale 2 puffs into the lungs every 4 (four) hours as needed for Wheezing. 3 each 3    fluticasone propionate 50 MCG/ACT Nasal Suspension 2 sprays by Nasal route daily. 48 g 3    [] amoxicillin clavulanate 250-125 MG Oral Tab 1 tablet (250 mg total) by Per G Tube route daily. Taking for swelling to left side of jaw      lansoprazole 30 MG Oral Tablet Delayed  Release Dispersible 1 tablet (30 mg total) by Per G Tube route every morning before breakfast. 30 tablet 0    aspirin 81 MG Oral Tab EC Take 1 tablet (81 mg total) by mouth daily. (Patient taking differently: Take 1 tablet (81 mg total) by mouth daily. Per G-tube) 90 tablet 3    cetirizine 10 MG Oral Tab Take 1 tablet (10 mg total) by mouth every other day.      polyethylene glycol, PEG 3350, 17 g Oral Powd Pack 17 g by Per G Tube route daily as needed (Constipation).      Insulin Lispro, 1 Unit Dial, (HUMALOG KWIKPEN) 100 UNIT/ML Subcutaneous Solution Pen-injector Take subcutaneously 4 times daily before tube feedings per sliding scale. Max total dose of 20 units. (Patient not taking: Reported on 2024) 6 mL 1    [] glucagon (GVOKE HYPOPEN 2-PACK) 1 MG/0.2ML Subcutaneous injection Inject 0.2 mL (1 mg total) into the skin once as needed for Low blood glucose. 1 each 0    Insulin Pen Needle 33G X 4 MM Does not apply Misc 1 each 4 (four) times daily. 200 each 1    albuterol (2.5 MG/3ML) 0.083% Inhalation Nebu Soln Take 3 mL (2.5 mg total) by nebulization every 4 (four) hours as needed for Wheezing or Shortness of Breath. (Patient not taking: Reported on 2024) 360 mL 3    HYDROcodone-acetaminophen 5-325 MG Oral Tab 1 tablet by Per G Tube route every 8 (eight) hours as needed. (Patient not taking: Reported on 2024) 90 tablet 0    Incontinence Supply Disposable (COMFORT PROTECT ADULT DIAPER/L) Does not apply Misc 1 Application daily. 30 each 3    Electronic Thermometer ([employer] DIGITAL THERMOMETER TEMPLE) Does not apply Misc Check temperature 1 each 0    insulin glargine (LANTUS SOLOSTAR) 100 UNIT/ML Subcutaneous Solution Pen-injector Inject 8 Units into the skin every morning. (Patient not taking: Reported on 2024) 15 mL 0    Insulin Pen Needle 32G X 4 MM Does not apply Misc Take as directed 200 each 3    Budesonide-Formoterol Fumarate (SYMBICORT) 160-4.5 MCG/ACT Inhalation Aerosol Inhale 2 puffs  into the lungs 2 (two) times daily. (Patient not taking: Reported on 12/6/2024) 3 each 3    Starch-Maltodextrin (THICK-IT) Oral Powd Pack Use as directed (Patient not taking: Reported on 12/6/2024) 200 each 3    albuterol (2.5 MG/3ML) 0.083% Inhalation Nebu Soln Take 3 mL (2.5 mg total) by nebulization in the morning and 3 mL (2.5 mg total) before bedtime. (Patient not taking: Reported on 12/3/2024)      lidocaine 4 % External Patch Place 1 patch onto the skin daily. (Patient not taking: Reported on 12/3/2024)      acetaminophen 500 MG Oral Tab Take 1 tablet (500 mg total) by mouth every 6 (six) hours as needed for Pain. (Patient not taking: Reported on 12/6/2024)      Cholecalciferol 125 MCG (5000 UT) Oral Tab Take 1 tablet (5,000 Units total) by mouth 2 (two) times daily. (Patient not taking: Reported on 12/3/2024)

## 2024-12-20 NOTE — PROGRESS NOTES
[facility]  part of [facility]    Progress Note    Ollie Hernández Patient Status:  Inpatient    1945 MRN [id number]   Location [facility] 5SW/SE Attending Dee Joyce,*   Hosp Day # 14 PCP Wili Parmar MD     Chief Complaint: feet hurt    Subjective:     Constitutional:  Positive for appetite change and fatigue.   Respiratory:  Positive for cough. Negative for choking.    Cardiovascular:  Negative for leg swelling.   Gastrointestinal:  Positive for abdominal pain, constipation and abdominal distention.   Genitourinary:  Negative for dysuria.   Musculoskeletal:  Positive for back pain.   Neurological:  Positive for weakness.   Psychiatric/Behavioral:  Negative for behavioral problems and decreased concentration. The patient is not hyperactive.        Objective:   Blood pressure 155/58, pulse 80, temperature 98.5 °F (36.9 °C), temperature source Oral, resp. rate 16, height 5' 4\" (1.626 m), weight 164 lb 0.4 oz (74.4 kg), SpO2 97%.  Physical Exam  Vitals and nursing note reviewed.   HENT:      Head: Normocephalic and atraumatic.   Cardiovascular:      Rate and Rhythm: Normal rate.   Pulmonary:      Breath sounds: Rhonchi present.   Skin:     General: Skin is warm and dry.      Capillary Refill: Capillary refill takes less than 2 seconds.   Neurological:      General: No focal deficit present.      Mental Status: Mental status is at baseline.   Psychiatric:         Behavior: Behavior normal.         Results:   Lab Results   Component Value Date    WBC 13.8 (H) 2024    HGB 10.7 (L) 2024    HCT 33.5 (L) 2024    .0 2024    CREATSERUM 2.73 (H) 2024    BUN 37 (H) 2024     2024    K 4.1 2024    CL 97 (L) 2024    CO2 31.0 2024     (H) 2024    CA 10.2 2024    ALB 3.7 2024    ALKPHO 116 2024    BILT 0.3 2024    TP 5.7 2024    AST 32 2024    ALT 29  12/07/2024    PTT 30.1 08/09/2024    INR 1.45 (H) 08/09/2024    T4F 0.9 08/31/2022    TSH 2.844 07/04/2024     (H) 07/30/2024    PSA 2.94 10/20/2021    DDIMER 6.07 (H) 09/29/2024    ESRML 10 06/16/2024    CRP 1.30 (H) 06/16/2024    MG 2.6 12/19/2024    PHOS 2.1 (L) 12/20/2024    TROP <0.045 07/25/2019    TROPHS 11 12/16/2024     08/05/2023    B12 672 07/04/2024       No results found.        Assessment & Plan:     Peripheral polyneuropathy; checked rpr ok; thyroid; electropheresis; b12 ok 7/24  -Patient p/w bilateral lower extremity pain  -Significantly worse over the past 3 to 4 days.  -No real improvement with gabapentin at home  -Difficulties with ambulation because of pain  -Pain control as able; pt screaming in pain or asleep; pain clinic saw; await mri  poss steroid   -PT/OT  -Continue to monitor  --increased confusion and hypoxia on p.o. narcotics, will discontinue all narcotics at this time, if pain ensues consider PCA for better autoregulation.  Has not needed them so far.  - WILLIAM on 12/17/2024 which is when he would be 7 days away from Plavix.  Appreciate pain service; ok to restart palvix     Acute respiratory failure, confusion/hypoxia: Significant event 12/14/2020/probable aspiration pneumonia  Possibly secondary to narcotics although concern for aspiration versus pneumonia  Chest x-ray consistent with some pulmonary edema and questionable underlying pneumonia  CT head no acute findings  ABG reviewed no CO2 narcosis  IV Unasyn initiated  O2 support as needed, wean as able  Continue to monitor     ESRD on HD  -Normally receives dialysis on Monday Wednesday Friday.  .  -Nephrology consulted, further HD per nephrology.had dialysis 12/18     Anemia of chronic renal disease and iron defy   -checked stool for blood, neg;  -Likely secondary to ESRD as above  -Hemoglobin noted to be low transfused again 12/18  -Continue to monitor      Afib :   has watchchad romano put in in 9/2024; mri ok  with watchman per dr coffey  12/13: Patient had episode of asymptomatic transient RVR self-limited.  12/16; 12/20: Again RVR during dialysis, reach out to cardiology for any further medication adjustments.    Beta-blocker increased, currently rate controlled appreciate cardiology recommendations        Type 2 DM:  - Monitoring Blood glucose with POC checks  - SSI to cover hyperglycemia  - Hypoglycemia protocol  - Will monitor and adjust agents as needed.  -Patient has appoint with Dr. Sevilla on 12/17/2024.  If blood sugars increase after steroid injection will reach out to endocrinology if not patient needs to reschedule since there is no urgent reason for consult at this time.             -KRAIG  -Hypertension  -Chronic dCHF  -Pulm HTN    Adb bloating and constip from narcs           Patient will require inpatient services that will reasonably be expected to span two midnight's based on the clinical documentation in H+P.   Based on patients current state of illness, I anticipate that, after discharge, patient will require jefferson    Complex mdm; coordinating care with nurse/dr bridges/dr coffey and counseling pt and with his permission his wife  about pain poss dc monday      BRITTANY WAY MD

## 2024-12-20 NOTE — PROGRESS NOTES
[facility]  part of [facility]    Progress Note    Ollie Hernández Patient Status:  Inpatient    1945 MRN [id number]   Location [facility] 5SW/SE Attending Dee Joyce,*   Hosp Day # 14 PCP Wili Parmar MD       Subjective:   Ollie Hernández is a(n) 79 year old male who I am seeing for ESRD    Seen on dialysis      Objective:   /58 (BP Location: Left arm)   Pulse 80   Temp 98.5 °F (36.9 °C) (Oral)   Resp 16   Ht 5' 4\" (1.626 m)   Wt 164 lb 0.4 oz (74.4 kg)   SpO2 97%   BMI 28.15 kg/m²      Intake/Output Summary (Last 24 hours) at 2024 1153  Last data filed at 2024 0624  Gross per 24 hour   Intake 1161 ml   Output --   Net 1161 ml     Wt Readings from Last 1 Encounters:   24 164 lb 0.4 oz (74.4 kg)       Exam  Gen: No acute distress, Heent: NC AT, mucous memb clear, neck supple  Pulm: Lungs clear, normal respiratory effort  CV: Heart with regular rate and rhythm, no edema  Abd: Abdomen soft, nontender, nondistended, no organomegaly, bowel sounds present  Skin: no symptoms reported  Psych: alert and oriented    Assessment and Plan:       1 - ESRD  We are doing a short dialysis treatment today as the patient is planned to be discharged.  He is typically  schedule.  His dialysis should be continued as a life-sustaining modality    2 -lower extremity pain  Physical therapy and pain medications     3 -anemia  Status post PRBC .  He is on Epogen     4 -A-fib with RVR/coronary artery disease  Has Watchman  The patient is on aspirin and Plavix as well as amiodarone.  Cardiology following     5 -hypertension with ESRD  On metoprolol     6 -aspiration pneumonia/respiratory failure  On Unasyn     To be discharged today          Results:     Recent Labs   Lab 24  0530 24  1620 24  0540 24  0532   RBC 2.25*  --  3.36* 3.47*   HGB 6.6* 10.0* 10.0* 10.7*   HCT 21.8*  --  32.6* 33.5*   MCV 96.9  --  97.0  96.5   MCH 29.3  --  29.8 30.8   MCHC 30.3*  --  30.7* 31.9   RDW 17.4*  --  17.9* 17.8*   NEPRELIM 7.52  --  11.01* 11.14*   WBC 9.5  --  13.5* 13.8*   .0  --  284.0 283.0         Recent Labs   Lab 12/18/24  0530 12/19/24  0540 12/20/24  0532   *  114* 133* 138*   BUN 30*  30* 55* 37*   CREATSERUM 2.09*  2.09* 3.69* 2.73*   CA 6.7*  6.7* 10.1 10.2   *  150* 135* 137   K 2.8*  2.8* 4.4 4.1     109 96* 97*   CO2 24.0  24.0 31.0 31.0          No results found.        SONDRA MART MD  12/20/2024

## 2024-12-20 NOTE — DIETARY NOTE
ADULT NUTRITION REASSESSMENT    Pt is at high nutrition risk.  Pt does not meet malnutrition criteria.      RECOMMENDATIONS TO MD: See Nutrition Intervention for enteral nutrition specifics     ADMITTING DIAGNOSIS:  ESRD on hemodialysis (HCC) [N18.6, Z99.2]Peripheral polyneuropathy [G62.9]    PERTINENT PAST MEDICAL HISTORY:   Past Medical History:    Anemia    Asthma (HCC)    Back problem    BPH (benign prostatic hyperplasia)    Calculus of kidney    Cataract    Coronary atherosclerosis    Diabetes (HCC)    ESRD (end stage renal disease) on dialysis (HCC)    Essential hypertension    High blood pressure    High cholesterol    History of blood transfusion    Hyperlipidemia    Neuropathy    hands and feet    KRAIG on CPAP    Renal disorder    Sleep apnea    Visual impairment    glasses    Vocal cord paralysis, unilateral partial     PATIENT STATUS: Initial 12/07/24: Pt admit for ESRD on HD and peripheral polyneuropathy. PMH sig for ESRD on HD (MWF), Diabetes (A1c 6.8% on 12/3/24), G-tube (placed 10/19/24) and others noted above. Pt assessed due to screening at risk for pressure injury (PI) and chronic g-tube. RD also received consult to order and manage tube feedings. Pt known to nutrition services from previous admissions, last seen 10/21/24. Chart reviewed, pt admitted with c/o bilateral foot pain - recently discharged from rehab for hip fracture. PEG tube placed recently s/p aspiration pneumonia and inability to swallow. Discussion with RN, no concerns. Pt visited, spouse at bedside assisting in home TF regimen and weight history. Spouse reports using bolus feedings as continuous feedings over 18 hours was too difficult due to in and out of various doctor appointments therefore was told to switch to bolus feedings - timeframe unknown. Spouse reports doing 1 carton 3x/day (3 cartons total) - spouse reports trying for 3.5 cartons daily but consistently 3 cartons daily. Spouse reports usual body weight (UBW) prior to  getting sick 155-157# - last known around September 2024. Current weight 148# 9.6 oz. EMR weight review, last known weight # on 11/30/24. Per EMR weight review, pt noted weighing 143# 12 oz on 10/2/24 - stable, 155# 11 oz on 9/28/24 - 7.1# or 4.6% weight loss x 3 months (non-significant),  157# 11 oz on 6/1/24 - 9.1# or 5.8% weight loss x 6 months (non-significant) and 166# on 11/30/23 - 17.4# or 10.5% weight loss x 13 months (non-significant). Nutrition focused physical exam (NFPE) completed, see below for details. See nutrition intervention for TF recommendations, start with continuous feedings and transition to bolus feedings prior to discharge.   12/08/24 UPDATE: Pt discussed with RN via phone. Adjusted EN feeds to home regimen, bolus feeds, now that pt is tolerating feeds at goal rate. RN to hold continuous feeds now and start with first bolus at 1600 hrs.    12/9/2024 Update:  Re-evaluated d/t formula unavailability . Tolerating bolus feeding. + BM. Dialysis today. Labs this am were pre-dialysis values with notable hypokalemia and hypophosphatemia. Per discussion with MD, allowing to use Standard formula for few days and should resume to Nepro for when discharge. Resumed EN to continuous and used Jevity 1.5 fiber containing formula. Discussed with RN holding an hour before and after lansoprazole administration.    12/12/2024 Update:  Followed up early d/t need to change formula back to Renal formula. Tolerating current EN at goal rate. HD yesterday with 1500 ml fluid removed. BG noted with recent increase d/t formula change. On basal and corrective factor insulin. Remains on lansoprazole.     Will resume EN back to Renal formula ( Nepro) and Bolus feeding method for long term nutrition therapy.   12/16/2024 Update: Saw pt after dialysis. Dialysis today was discontinued early due to patient going into Afib. 600mL fluid output.  Pt upset at medical status of himself. Tolerating EN at goal rate. Flush  ordered 30mL before and after bolus feed. Continues on lansoprazole. Mild hyponatremia. +BM 12/16.    Update 12/20/24: RA completed per protocol. Chart reviewed, having episodes of a-fib throughout admission. Tolerating dialysis - 2L removed 12/17. Discussion with RN, had dialysis today. Tolerating TF. Currently receiving bolus feedings. Pt visited, denies any concerns. No nausea/vomiting. Hyponatremia resolved. +BM 12/19. Current weight likely inaccurate, yesterday weight 152#. Monitor fluid status. CPM    FOOD/NUTRITION RELATED HISTORY:  Intake:  Per spouse: Nepro bolus feedings: 1 carton 3x/day (total of 3 cartons total). Provides 1260 calories, 57 grams protein and 516 ml free water.  FWF 1 syringe before and after each feeding (unsure exact amount of syringe)  Spouse reports trying to get 3.5 cartons in daily.   Intake Meeting Needs: TF meeting needs     Food Allergies: No Known Food Allergies (NKFA)  Cultural/Ethnic/Scientology Preferences: Not Obtained    GASTROINTESTINAL: +BM 12/19/24 - smear;soft, diarrhea, and PEG/G-tube    MEDICATIONS: reviewed   Lansoprazole q morning--on bolus EN feeding now.    metoprolol tartrate  100 mg Oral 2x Daily(Beta Blocker)    bisacodyl  10 mg Oral Daily    nortriptyline  10 mg Per G Tube Nightly    simethicone  125 mg Per G Tube TID CC and HS    clopidogrel  75 mg Per G Tube Daily    metoprolol  5 mg Intravenous Once    acetaminophen  500 mg Oral q6h    ampicillin-sulbactam  1.5 g Intravenous q12h    insulin regular human  1-7 Units Subcutaneous 4 times per day    [Held by provider] gabapentin  300 mg Per G Tube BID    aspirin  81 mg Per G Tube Daily    lidocaine-menthol  2 patch Transdermal Daily    epoetin donna  10,000 Units Subcutaneous Once per day on Monday Wednesday Friday    amiodarone  200 mg Per G Tube Daily    atorvastatin  40 mg Per G Tube Nightly    fluticasone propionate  2 spray Nasal Daily    insulin degludec  5 Units Subcutaneous Nightly    lansoprazole  30 mg  Per G Tube QAM AC    heparin  5,000 Units Subcutaneous Q8H STEPHANIE      dextrose 10%       LABS: reviewed Na WNL  A1c 6.8% on 12/3/24  Hypophosphatemia (2.1) noted  Not using lyte protocol as pt on dialysis.  Recent Labs     12/18/24  0530 12/19/24  0540 12/20/24  0532   *  114* 133* 138*   BUN 30*  30* 55* 37*   CREATSERUM 2.09*  2.09* 3.69* 2.73*   CA 6.7*  6.7* 10.1 10.2   MG  --  2.6  --    *  150* 135* 137   K 2.8*  2.8* 4.4 4.1     109 96* 97*   CO2 24.0  24.0 31.0 31.0   PHOS 1.3* 2.6 2.1*   OSMOCALC 317*  317* 297* 295     NUTRITION RELATED PHYSICAL FINDINGS:  - Nutrition Focused Physical Exam (NFPE): mild depletion body fat triceps region  - Fluid Accumulation: non-pitting Bilateral Lower extremity and +2 Bilateral Feet see RN documentation for details  - Skin Integrity: Pressure Injury: Stage 2 on posterior sacrum and DTI on left heel and right;lateral ankle and other wounds noted per flowsheet review --> see RN documentation for details    ANTHROPOMETRICS:  HT: 162.6 cm (5' 4\")  WT: 74.4 kg (164 lb 0.4 oz) - wt gain, monitor accuracy. Pt noted weighing 152# 12 oz on 12/18/24  Last Visit Wt: 142# on 12/15/24  Admit wt : 148# 10 oz on 12/6/24   BMI: Body mass index is 28.15 kg/m².  BMI CLASSIFICATION: 25-29.9 kg/m2 - overweight  IBW: 130 lbs        114% IBW compared to admit wt  Usual Body Wt: 155-157 lbs per spouse      95-96% UBW compared to admit wt    WEIGHT HISTORY:  Patient Weight(s) for the past 336 hrs:   Weight   12/19/24 0546 74.4 kg (164 lb 0.4 oz)   12/18/24 0600 69.3 kg (152 lb 12.5 oz)   12/17/24 0528 71.2 kg (156 lb 15.5 oz)   12/16/24 0415 64.4 kg (141 lb 15.6 oz)   12/15/24 0509 75.2 kg (165 lb 12.6 oz)   12/14/24 0518 68.9 kg (151 lb 14.4 oz)   12/13/24 0615 68.2 kg (150 lb 5.7 oz)   12/12/24 0604 68.7 kg (151 lb 7.3 oz)   12/10/24 0645 66.6 kg (146 lb 14.4 oz)   12/09/24 0423 67.1 kg (147 lb 14.4 oz)   12/08/24 0650 67.2 kg (148 lb 1.6 oz)   12/06/24 1805 67.4 kg  (148 lb 9.6 oz)   12/06/24 1702 67.4 kg (148 lb 9.6 oz)     Wt Readings from Last 10 Encounters:   12/19/24 74.4 kg (164 lb 0.4 oz)   11/30/24 65.8 kg (145 lb)   10/22/24 60.3 kg (132 lb 14.4 oz)   08/22/24 72.5 kg (159 lb 12.8 oz)   08/20/24 71.7 kg (158 lb)   08/13/24 65.6 kg (144 lb 11.2 oz)   08/06/24 70.8 kg (156 lb)   08/01/24 66.5 kg (146 lb 8 oz)   06/27/24 73.9 kg (163 lb)   06/13/24 71 kg (156 lb 8 oz)     NUTRITION DIAGNOSIS/PROBLEM:   Inadequate enteral nutrition infusion related to Infusion volume not reached or schedule for infusion interrupted as evidenced by TF currently not running   Nutrition Diagnosis Progress: Improvement (unresolved) EN at full nutrition.     Increased nutrient needs related to increased demand of nutrient as evidenced by pressure injuries per flowsheet   Nutrition Diagnosis Progress:    Remains of concern    NUTRITION INTERVENTION:   NUTRITION PRESCRIPTION:   Estimated Nutrition needs: --dosing wt of 67.4 kg - wt taken on 12/6/24  Calories: 6027-5468 calories/day (MSJ 1314 kcal x 1.3 AF x 1.0 IF or 25-30  calories per kg Dosing wt)   Protein: 67-81 g protein/day (1.0-1.2 g protein/kg Dosing wt)  Fluid Needs: 1200 ml (20ml/kg 59 kg IBW)  Adjust per clinical status (ESRD)    - Diet:       Procedures    NPO     - Enteral Nutrition ( EN) :    Bolus feeding Nepro 4.5 cartons daily via G-tube  (1 @ 0800, 1.5 @ 1200, 1 @ 1600, 1 @ 2000). FWF 50 ml before and after each feeding (400 ml).   Provides: 1890 calories, 86 grams protein, 774 ml free water. Total water: 1,174 ml total water.   Met 100% of nutrition needs and or >100% of RDIs    - RD Malnutrition Care Plan:  Long term use of Nutrition support therapy ( NST)  - Vitamin and mineral supplements: none  - Nutrition education: assess education needs   - Coordination of nutrition care: collaboration with other providers  - Discharge and transfer of nutrition care to new setting or provider: monitor plans    MONITOR AND  EVALUATE/NUTRITION GOALS:  - Food and Nutrient Administration:      Monitor: tolerance to enteral nutrition, adequacy of enteral nutrition, resumption of enteral nutrition, and for enteral nutrition adjustment  - Anthropometric Measurement:    Monitor weight  - Nutrition Goals:    maintain wt within 5%, tube feed meets greater than 80% of needs, labs within acceptable limits, minimize lean body mass loss, support wound healing, support body systems, and improved GI status      DIETITIAN FOLLOW UP: RD to follow and monitor nutrition status    Yana Iqbal MS, LESLY, RDN, LDN  Clinical Dietitian  P: [phone]

## 2024-12-21 LAB
BASOPHILS # BLD AUTO: 0.07 X10(3) UL (ref 0–0.2)
BASOPHILS NFR BLD AUTO: 0.5 %
DEPRECATED RDW RBC AUTO: 59.7 FL (ref 35.1–46.3)
EOSINOPHIL # BLD AUTO: 0.13 X10(3) UL (ref 0–0.7)
EOSINOPHIL NFR BLD AUTO: 1 %
ERYTHROCYTE [DISTWIDTH] IN BLOOD BY AUTOMATED COUNT: 17.5 % (ref 11–15)
GLUCOSE BLDC GLUCOMTR-MCNC: 136 MG/DL (ref 70–99)
GLUCOSE BLDC GLUCOMTR-MCNC: 162 MG/DL (ref 70–99)
GLUCOSE BLDC GLUCOMTR-MCNC: 198 MG/DL (ref 70–99)
GLUCOSE BLDC GLUCOMTR-MCNC: 212 MG/DL (ref 70–99)
HCT VFR BLD AUTO: 34 %
HGB BLD-MCNC: 10.8 G/DL
IMM GRANULOCYTES # BLD AUTO: 0.13 X10(3) UL (ref 0–1)
IMM GRANULOCYTES NFR BLD: 1 %
LYMPHOCYTES # BLD AUTO: 1.2 X10(3) UL (ref 1–4)
LYMPHOCYTES NFR BLD AUTO: 9.4 %
MAGNESIUM SERPL-MCNC: 2.3 MG/DL (ref 1.6–2.6)
MCH RBC QN AUTO: 30.8 PG (ref 26–34)
MCHC RBC AUTO-ENTMCNC: 31.8 G/DL (ref 31–37)
MCV RBC AUTO: 96.9 FL
MONOCYTES # BLD AUTO: 1.08 X10(3) UL (ref 0.1–1)
MONOCYTES NFR BLD AUTO: 8.4 %
NEUTROPHILS # BLD AUTO: 10.2 X10 (3) UL (ref 1.5–7.7)
NEUTROPHILS # BLD AUTO: 10.2 X10(3) UL (ref 1.5–7.7)
NEUTROPHILS NFR BLD AUTO: 79.7 %
PHOSPHATE SERPL-MCNC: 1.9 MG/DL (ref 2.4–5.1)
PLATELET # BLD AUTO: 286 10(3)UL (ref 150–450)
RBC # BLD AUTO: 3.51 X10(6)UL
WBC # BLD AUTO: 12.8 X10(3) UL (ref 4–11)

## 2024-12-21 PROCEDURE — 99232 SBSQ HOSP IP/OBS MODERATE 35: CPT | Performed by: INTERNAL MEDICINE

## 2024-12-21 PROCEDURE — 99233 SBSQ HOSP IP/OBS HIGH 50: CPT | Performed by: HOSPITALIST

## 2024-12-21 RX ORDER — HYDRALAZINE HYDROCHLORIDE 20 MG/ML
10 INJECTION INTRAMUSCULAR; INTRAVENOUS EVERY 4 HOURS PRN
Status: DISCONTINUED | OUTPATIENT
Start: 2024-12-21 | End: 2024-12-23

## 2024-12-21 NOTE — PLAN OF CARE
Problem: Patient Centered Care  Goal: Patient preferences are identified and integrated in the patient's plan of care  Description: Interventions:  - What would you like us to know as we care for you? I am from home with my wife  - Provide timely, complete, and accurate information to patient/family  - Incorporate patient and family knowledge, values, beliefs, and cultural backgrounds into the planning and delivery of care  - Encourage patient/family to participate in care and decision-making at the level they choose  - Honor patient and family perspectives and choices  Outcome: Progressing     Problem: MUSCULOSKELETAL - ADULT  Goal: Return mobility to safest level of function  Description: INTERVENTIONS:  - Assess patient stability and activity tolerance for standing, transferring and ambulating w/ or w/o assistive devices  - Assist with transfers and ambulation using safe patient handling equipment as needed  - Ensure adequate protection for wounds/incisions during mobilization  - Obtain PT/OT consults as needed  - Advance activity as appropriate  - Communicate ordered activity level and limitations with patient/family  Outcome: Progressing     Problem: PAIN - ADULT  Goal: Verbalizes/displays adequate comfort level or patient's stated pain goal  Description: INTERVENTIONS:  - Encourage pt to monitor pain and request assistance  - Assess pain using appropriate pain scale  - Administer analgesics based on type and severity of pain and evaluate response  - Implement non-pharmacological measures as appropriate and evaluate response  - Consider cultural and social influences on pain and pain management  - Manage/alleviate anxiety  - Utilize distraction and/or relaxation techniques  - Monitor for opioid side effects  - Notify MD/LIP if interventions unsuccessful or patient reports new pain  - Anticipate increased pain with activity and pre-medicate as appropriate  Outcome: Progressing     Problem: SAFETY ADULT -  FALL  Goal: Free from fall injury  Description: INTERVENTIONS:  - Assess pt frequently for physical needs  - Identify cognitive and physical deficits and behaviors that affect risk of falls.  - [place] fall precautions as indicated by assessment.  - Educate pt/family on patient safety including physical limitations  - Instruct pt to call for assistance with activity based on assessment  - Modify environment to reduce risk of injury  - Provide assistive devices as appropriate  - Consider OT/PT consult to assist with strengthening/mobility  - Encourage toileting schedule  Outcome: Progressing     At dialysis pt switched to afib. 1L was removed. Staying another night to observe

## 2024-12-21 NOTE — PROGRESS NOTES
Cardiology Progress Note  [facility]    Ollie Hernández Patient Status:  Inpatient    1945 MRN [id number]   Location [facility] 5SW/SE Attending Lizbeth Rey MD   Hosp Day # 15 PCP Wili Parmar MD     Primary Cardiologist: Dr. Phelan    Reason for Consultation:  Afib    Subjective:  Doing well. No CV complaints. Getting dialysis this morning. He has paroxysmal episodes of afib with RVR, which self-terminate. He went back into afib during dialysis yesterday    Feels injection helped w/ more foot movement    Assessment/Plan:  Afib s/p Watchman 24 (24mm Watchman FLX ). AF also likely precipitated by volume overload and possible infection.  CAD s/p PCI LCX with [place] ABIMBOLA x 1 (24)   HFpEF  ESRD on iHD  T2DM  KRAIG  HTN // HL   Polyneuropathy,    - Restart DAPT when able.  - metoprolol to 100mg BID. HR okay.  - IV metoprolol 5mg prn if needed during dialysis if rates > 130 bpm.  - continue amio 200mg daily. If patient continues to have episodes of RVR, can increase to amio 200mg TID. Overall, his episodes of RVR are self-limited and he is asymptomatic. Reviewed w/family at bedside potential SE of Amiodarone and need for F/ U  to screen fo rSE  - TTE reviewed. RVSP 62 mmHg.   - RVSP 62 mmHg and right pleural effusion- recommend further dialysis and challenge dry weight.  - Tele   - Statin   - Will continue to follow.    History:  Past Medical History:    Anemia    Asthma (HCC)    Back problem    BPH (benign prostatic hyperplasia)    Calculus of kidney    Cataract    Coronary atherosclerosis    Diabetes (HCC)    ESRD (end stage renal disease) on dialysis (HCC)    Essential hypertension    High blood pressure    High cholesterol    History of blood transfusion    Hyperlipidemia    Neuropathy    hands and feet    KRAIG on CPAP    Renal disorder    Sleep apnea    Visual impairment    glasses    Vocal cord paralysis, unilateral partial     Past Surgical History:   Procedure  Laterality Date    Appendectomy      1981    Back surgery      Neck/back - 1998    Capsule endoscopy - internal referral      Cataract extraction Right 01/08/2022    PHACOEMULSIFICATION WITH POSTERIOR CHAMBER INTRAOCULAR LENS, RIGHT EYE    Cataract extraction Left 12/21/2021    PHACOEMULSIFICATION WITH POSTERIOR CHAMBER INTRAOCULAR LENS, LEFT EYE    Cath bare metal stent (bms)      Cath drug eluting stent  05/21/2024    Successful IVUS guided PCI of the circumflex with a ABIMBOLA    Colonoscopy      Colonoscopy N/A 01/25/2021    Procedure: COLONOSCOPY;  Surgeon: Michael Gautam MD;  Location: [facility] ENDO    Colonoscopy N/A 06/03/2024    Procedure: COLONOSCOPY;  Surgeon: Gabriel Saldana MD;  Location: [facility] ENDOSCOPY    Colonoscopy N/A 06/15/2024    Procedure: COLONOSCOPY;  Surgeon: Carlyn Storey MD;  Location: [facility] ENDOSCOPY    Colonoscopy N/A 07/31/2024    Procedure: COLONOSCOPY;  Surgeon: Gabriel Saldana MD;  Location: [facility] ENDOSCOPY    Dialysis procedure  02/17/2023    right internal jugular tunneled dialysis catheter placement.    Hand/finger surgery unlisted      Accidental trauma    Spine surgery procedure unlisted      Total hip arthroplasty Left 10/04/2024    Left anterior total hip arthroplasty    Upper gi endoscopy,diagnosis       Family History   Problem Relation Age of Onset    Cancer Father         Kidney    Breast Cancer Mother     Diabetes Mother     Diabetes Maternal Grandmother     Diabetes Maternal Grandfather     Heart Disorder Other         Uncle      reports that he quit smoking about 44 years ago. His smoking use included cigarettes. He started smoking about 61 years ago. He has a 17 pack-year smoking history. He has never used smokeless tobacco. He reports that he does not drink alcohol and does not use drugs.    Allergies:  Allergies[1]    Medications:  Medications Ordered Prior to Encounter[2]        Physical Exam:  Blood pressure 152/64, pulse 75, temperature 97.8 °F (36.6 °C), temperature source  Oral, resp. rate 18, height 162.6 cm (5' 4\"), weight 166 lb (75.3 kg), SpO2 97%.  Wt Readings from Last 3 Encounters:   12/21/24 166 lb (75.3 kg)   11/30/24 145 lb (65.8 kg)   10/22/24 132 lb 14.4 oz (60.3 kg)       General: awake, alert, oriented x 3, no acute distress  HEENT: at/nc, perrl, eomi  Neck: No JVD, carotids 2+ no bruits.  Cardiac: tachy, irregular S1, S2 normal, no rub or gallop. Soft grade ii/vi systolic murmur  Lungs: Clear without wheezes, rales, rhonchi or dullness.  Normal excursions and effort.  Abdomen: Soft, non-tender, non-distended, normal bowel sounds   Extremities: Without clubbing, cyanosis or edema.  Peripheral pulses are 2+. R arm fistula L hand missing digits  Neurologic: Alert and oriented, normal affect.  Psych: normal mood and affect  Skin: Warm and dry.       Laboratories and Data:  Diagnostics:      Labs:   CBC:    Lab Results   Component Value Date    WBC 12.8 (H) 12/21/2024    WBC 13.8 (H) 12/20/2024    WBC 13.5 (H) 12/19/2024     Lab Results   Component Value Date    HGB 10.8 (L) 12/21/2024    HGB 10.7 (L) 12/20/2024    HGB 10.0 (L) 12/19/2024      Lab Results   Component Value Date    .0 12/21/2024    .0 12/20/2024    .0 12/19/2024     BMP:   No results found for: \"GLUCOSE\"  Lab Results   Component Value Date    K 3.4 (L) 12/20/2024    K 4.1 12/20/2024    K 4.4 12/19/2024     Lab Results   Component Value Date    BUN 25 (H) 12/20/2024    BUN 37 (H) 12/20/2024    BUN 55 (H) 12/19/2024     Lab Results   Component Value Date    CREATSERUM 1.87 (H) 12/20/2024    CREATSERUM 2.73 (H) 12/20/2024    CREATSERUM 3.69 (H) 12/19/2024     Cholesterol:     Lab Results   Component Value Date    CHOLEST 179 09/29/2024    CHOLEST 146 07/31/2024    CHOLEST 153 07/04/2024     Lab Results   Component Value Date    HDL 41 09/29/2024    HDL 29 (L) 07/31/2024    HDL 41 07/04/2024     Lab Results   Component Value Date    TRIG 259 (H) 10/17/2024    TRIG 160 (H) 10/04/2024    TRIG  206 (H) 10/02/2024     Lab Results   Component Value Date     (H) 2024    LDL 93 2024     (H) 2024     Lab Results   Component Value Date    AST 97 (H) 2024    AST 32 2024    AST 42 (H) 2024     Lab Results   Component Value Date     (H) 2024    ALT 29 2024    ALT 38 2024                [1]   Allergies  Allergen Reactions    Adhesive Tape OTHER (SEE COMMENTS)     Severe rashes    Dust Mite Extract RASH   [2]   Current Facility-Administered Medications on File Prior to Encounter   Medication Dose Route Frequency Provider Last Rate Last Admin    [COMPLETED] acetaminophen (Tylenol) 160 MG/5ML oral liquid 500 mg  500 mg Per G Tube Once Gabriela Allen MD   500 mg at 24 0209    [COMPLETED] gabapentin (Neurontin) 250 MG/5ML oral solution 300 mg  300 mg Per G Tube Once Gabriela Allen MD   300 mg at 24 0440    [COMPLETED] ceFAZolin (Ancef) 2g in 10mL IV syringe premix  2 g Intravenous Once Yash Sheppard MD   2 g at 10/19/24 1036    [] adult 3 in 1 TPN   Intravenous Continuous TPN Pranay Michel MD   Paused at 10/19/24 0250    [COMPLETED] sodium chloride 0.9 % IV bolus 250 mL  250 mL Intravenous Once Lorelei Mata  mL/hr at 10/17/24 0023 250 mL at 10/17/24 0023    [] adult 3 in 1 TPN   Intravenous Continuous TPN Pranay Michel MD 50 mL/hr at 10/17/24 2233 New Bag at 10/17/24 2233    [COMPLETED] dilTIAZem (cardIZEM) 25 mg/5mL injection 10 mg  10 mg Intravenous Once Pranay Michel MD   10 mg at 10/17/24 1330    [COMPLETED] sodium chloride 0.9 % IV bolus 250 mL  250 mL Intravenous Once Pranay Michel  mL/hr at 10/17/24 1700 250 mL at 10/17/24 1700    [COMPLETED] amiodarone (Cordarone) 150 mg in dextrose 5% 100 mL IV bolus  150 mg Intravenous Once Emerson Julio  mL/hr at 10/17/24 1805 150 mg at 10/17/24 1805    [COMPLETED] digoxin (Lanoxin) 250 MCG/ML injection 250 mcg  250 mcg Intravenous Once  Herman Phelan MD   250 mcg at 10/17/24 1750    [] sodium chloride 0.9 % IV bolus 100 mL  100 mL Intravenous Q30 Min PRN José Callahan MD        And    [] albumin human (Albumin) 25% injection 25 g  25 g Intravenous PRN Dialysis José Callahan MD        [] dextrose 5%-sodium chloride 0.45% infusion   Intravenous Continuous Lorelei Mata MD 50 mL/hr at 10/16/24 0038 New Bag at 10/16/24 0038    [] adult 3 in 1 TPN   Intravenous Continuous TPN Pranay Michel MD 50 mL/hr at 10/16/24 2118 New Bag at 10/16/24 2118    [COMPLETED] metoprolol (Lopressor) 5 mg/5mL injection 5 mg  5 mg Intravenous Once Herman Phelan MD   5 mg at 10/16/24 2307    [] sodium chloride 0.9 % IV bolus 100 mL  100 mL Intravenous Q30 Min PRN José Callahan MD        And    [] albumin human (Albumin) 25% injection 25 g  25 g Intravenous PRN Dialysis José Callahan MD        [] sodium chloride 0.9 % IV bolus 100 mL  100 mL Intravenous Q30 Min PRN Walker Klein MD        And    [] albumin human (Albumin) 25% injection 25 g  25 g Intravenous PRN Dialysis Walker Klein MD        [COMPLETED] metoprolol (Lopressor) 5 mg/5mL injection 5 mg  5 mg Intravenous Once Radha Dunham MD   5 mg at 10/12/24 0304    [COMPLETED] methylPREDNISolone sodium succinate (Solu-MEDROL) injection 40 mg  40 mg Intravenous BID Jayjay Mijares MD   40 mg at 10/13/24 0919    [] sodium chloride 0.9 % IV bolus 100 mL  100 mL Intravenous Q30 Min PRN Walker Klein MD        And    [] albumin human (Albumin) 25% injection 25 g  25 g Intravenous PRN Dialysis Walker Klein MD   25 g at 10/11/24 1900    [COMPLETED] methylPREDNISolone sodium succinate (Solu-MEDROL) injection 60 mg  60 mg Intravenous Once Jimmie Landeros MD   60 mg at 10/09/24 0148    [COMPLETED] EPINEPHrine-racemic (S-2) 2.25 % nebulizer solution 0.5 mL  0.5 mL Nebulization Once Jimmie Landeros MD   0.5 mL  at 10/09/24 0240    [] sodium chloride 0.9 % IV bolus 100 mL  100 mL Intravenous Q30 Min PRN Moni Pugh MD        And    [] albumin human (Albumin) 25% injection 25 g  25 g Intravenous PRN Dialysis Moni Pugh MD        [COMPLETED] methylPREDNISolone sodium succinate (Solu-MEDROL) injection 125 mg  125 mg Intravenous Once Gordon Borden MD   125 mg at 10/06/24 1823    [COMPLETED] EPINEPHrine-racemic (S-2) 2.25 % nebulizer solution 0.5 mL  0.5 mL Nebulization Once Ollie Santos MD   0.5 mL at 10/06/24 1812    [] methylPREDNISolone sodium succinate (Solu-MEDROL) 125 MG injection             [COMPLETED] potassium chloride (Klor-Con) 20 MEQ oral powder 20 mEq  20 mEq Oral Once Moni Pugh MD   20 mEq at 10/05/24 0556    [COMPLETED] fluconazole (Diflucan) tab 200 mg  200 mg Oral Once Lizbeth Rey MD   200 mg at 10/05/24 1610    [COMPLETED] potassium chloride (Klor-Con) 20 MEQ oral powder 10 mEq  10 mEq Oral Once Moni Pugh MD   10 mEq at 10/04/24 0736    [] ondansetron (Zofran) 4 MG/2ML injection 4 mg  4 mg Intravenous Once PRN Talha Gregg MD        [] prochlorperazine (Compazine) 10 MG/2ML injection 5 mg  5 mg Intravenous Once PRN Talha Gregg MD        [] haloperidol lactate (Haldol) 5 MG/ML injection 0.25 mg  0.25 mg Intravenous Once PRN Talha Gregg MD        [] fentaNYL (Sublimaze) 50 mcg/mL injection 25 mcg  25 mcg Intravenous Q5 Min PRN Talha Gregg MD   25 mcg at 10/04/24 1411    [] fentaNYL (Sublimaze) 50 mcg/mL injection 50 mcg  50 mcg Intravenous Q5 Min PRN Talha Gregg MD        [] HYDROmorphone (Dilaudid) 1 MG/ML injection 0.2 mg  0.2 mg Intravenous Q15 Min PRN Talha Gregg MD        [] HYDROmorphone (Dilaudid) 1 MG/ML injection 0.2 mg  0.2 mg Intravenous Q15 Min PRN Talha Gregg MD        [] HYDROmorphone (Dilaudid) 1 MG/ML injection 0.2 mg  0.2 mg Intravenous Q15 Min PRN Marysol  MD Talha        [] atropine 0.1 MG/ML injection 0.5 mg  0.5 mg Intravenous PRN Talha Gregg MD        [] naloxone (Narcan) 0.4 MG/ML injection 0.08 mg  0.08 mg Intravenous PRN Talha Gregg MD        [] sodium chloride 0.9 % IV bolus 500 mL  500 mL Intravenous Once PRN Humberto Murphy MD        [] diphenhydrAMINE (Benadryl) 50 mg/mL  injection 25 mg  25 mg Intravenous Once PRN Humberto Murphy MD        [COMPLETED] ceFAZolin (Ancef) 1 g in dextrose 5% 100mL IVPB-ADD  1 g Intravenous Q24H Humberto Murphy  mL/hr at 10/05/24 0833 1 g at 10/05/24 0833    [COMPLETED] clonidine-EPINEPHrine-ropivacaine-ketorolac (CERTS) (Duraclon-Adrenalin-Naropin-Toradol) pain cocktail irrigation   Intra-articular Once (Intra-Op) Humberto Murphy MD   Given at 10/04/24 1211    [] sodium chloride 0.9 % IV bolus 100 mL  100 mL Intravenous Q30 Min PRN Humberto Murphy MD        And    [] albumin human (Albumin) 25% injection 25 g  25 g Intravenous PRN Dialysis Humberto Murphy MD        [COMPLETED] tranexamic acid in sodium chloride 0.7% (Cyklokapron) 1000 mg/100mL infusion premix 1,000 mg  1,000 mg Intravenous 30 Min Pre-Op Humberto Murphy MD   1,000 mg at 10/04/24 1149    [COMPLETED] potassium chloride (Klor-Con) 20 MEQ oral powder 20 mEq  20 mEq Oral Once Mirta Redman MD   20 mEq at 10/02/24 202    [COMPLETED] sodium chloride 0.9 % IV bolus 250 mL  250 mL Intravenous Once Nora Jones APRN   Stopped at 10/01/24 1150    [] sodium chloride 0.9 % IV bolus 100 mL  100 mL Intravenous Q30 Min PRN Moni Pugh MD        And    [] albumin human (Albumin) 25% injection 25 g  25 g Intravenous PRN Dialysis Moni Pugh MD        [] piperacillin-tazobactam (Zosyn) 3.375 g in dextrose 5% 100 mL IVPB-ADDV  3.375 g Intravenous Q12H Jimmie Landeros MD 25 mL/hr at 10/04/24 1900 3.375 g at 10/04/24 190    [COMPLETED] iopamidol 76% (ISOVUE-370) injection for  power injector  80 mL Intravenous ONCE PRN Radha Gabriel MD   80 mL at 24 1126    [COMPLETED] furosemide (Lasix) 10 mg/mL injection 40 mg  40 mg Intravenous Once Nick Moreau MD   40 mg at 24 0607    [] furosemide (Lasix) 10 mg/mL injection             [COMPLETED] hydrALAzine (Apresoline) 20 mg/mL injection 10 mg  10 mg Intravenous Once Nick Moreau MD   10 mg at 24 0626    [] etomidate (Amidate) 2 mg/mL injection             [COMPLETED] propofol (Diprivan) 10 MG/ML injection        1,000 mg at 24 1436    [COMPLETED] piperacillin-tazobactam (Zosyn) 4.5 g in dextrose 5% 100 mL IVPB-ADDV  4.5 g Intravenous Once Nick Moreau  mL/hr at 24 0853 4.5 g at 24 0853    [] sodium chloride 0.9 % IV bolus 100 mL  100 mL Intravenous Q30 Min PRN José Callahan MD        [COMPLETED] nitroGLYCERIN (Nitrobid) 2 % ointment 1 inch  1 inch Topical Once Nick Moreau MD   1 inch at 24 0654    [COMPLETED] hydrALAzine (Apresoline) 20 mg/mL injection 10 mg  10 mg Intravenous Once Nick Moreau MD   10 mg at 24 0654    [COMPLETED] ondansetron (Zofran) 4 MG/2ML injection        4 mg at 24 1241    [COMPLETED] metoclopramide (Reglan) 5 mg/mL injection 5 mg  5 mg Intravenous Once Karla Irvin APRN   5 mg at 24 1300    [COMPLETED] norepinephrine (Levophed) 4 mg/250mL infusion premix        4,000 mcg at 24 1455    [COMPLETED] succinylcholine (Anectine) 20 MG/ML injection 70.6 mg  1 mg/kg Intravenous Once Atul Sheridan MD   70.6 mg at 24 1445    [COMPLETED] HYDROcodone-acetaminophen (Norco) 5-325 MG per tab 1 tablet  1 tablet Oral Once Vitor Kline MD   1 tablet at 24 1245    [COMPLETED] morphINE PF 4 MG/ML injection 4 mg  4 mg Intravenous Once Vitor Kline MD   4 mg at 24 1448    [COMPLETED] sodium chloride 0.9 % IV bolus 1,000 mL  1,000 mL Intravenous Once Vitor Kline MD 1,000 mL/hr at 24 1447  1,000 mL at 24 1447    [COMPLETED] ondansetron (Zofran) 4 MG/2ML injection 4 mg  4 mg Intravenous Once Vitor Kline MD   4 mg at 24 1448    [COMPLETED] ceFAZolin (Ancef) 2g in 10mL IV syringe premix  2 g Intravenous 30 Min Pre-Op Humberto Murphy MD   2 g at 10/04/24 0915    [COMPLETED] heparin (Porcine) 5000 UNIT/ML injection 5,000 Units  5,000 Units Subcutaneous Once Humberto Murphy MD   5,000 Units at 24 1758    [COMPLETED] dilTIAZem (cardIZEM) 25 mg/5mL injection 20 mg  20 mg Intravenous Once Vitor Kline MD   20 mg at 24 1619     Current Outpatient Medications on File Prior to Encounter   Medication Sig Dispense Refill    acetaminophen 500 MG Oral Tab 1 tablet (500 mg total) by Per G Tube route in the morning and 1 tablet (500 mg total) before bedtime.      Gabapentin 300 MG/6ML Oral Solution 300 mg by Peg Tube route in the morning and 300 mg before bedtime. 450 mL 3    linaGLIPtin (TRADJENTA) 5 mg Oral Tab 1 tablet (5 mg total) by Per G Tube route daily. 90 tablet 3    atorvastatin 40 MG Oral Tab 1 tablet (40 mg total) by Per G Tube route nightly. 90 tablet 3    amiodarone 200 MG Oral Tab 1 tablet (200 mg total) by Per G Tube route daily. 90 tablet 3    carvedilol 25 MG Oral Tab 1 tablet (25 mg total) by Per G Tube route 2 (two) times daily. 180 tablet 3    B Complex-C-Folic Acid (RENAL MULTIVITAMIN FORMULA) Oral Tab 1 tablet by Per G Tube route daily. 90 tablet 3    clopidogrel 75 MG Oral Tab Take 1 tablet (75 mg total) by mouth daily. 90 tablet 3    albuterol (PROAIR HFA) 108 (90 Base) MCG/ACT Inhalation Aero Soln Inhale 2 puffs into the lungs every 4 (four) hours as needed for Wheezing. 3 each 3    fluticasone propionate 50 MCG/ACT Nasal Suspension 2 sprays by Nasal route daily. 48 g 3    [] amoxicillin clavulanate 250-125 MG Oral Tab 1 tablet (250 mg total) by Per G Tube route daily. Taking for swelling to left side of jaw      lansoprazole 30 MG Oral Tablet Delayed  Release Dispersible 1 tablet (30 mg total) by Per G Tube route every morning before breakfast. 30 tablet 0    aspirin 81 MG Oral Tab EC Take 1 tablet (81 mg total) by mouth daily. (Patient taking differently: Take 1 tablet (81 mg total) by mouth daily. Per G-tube) 90 tablet 3    cetirizine 10 MG Oral Tab Take 1 tablet (10 mg total) by mouth every other day.      polyethylene glycol, PEG 3350, 17 g Oral Powd Pack 17 g by Per G Tube route daily as needed (Constipation).      Insulin Lispro, 1 Unit Dial, (HUMALOG KWIKPEN) 100 UNIT/ML Subcutaneous Solution Pen-injector Take subcutaneously 4 times daily before tube feedings per sliding scale. Max total dose of 20 units. (Patient not taking: Reported on 2024) 6 mL 1    [] glucagon (GVOKE HYPOPEN 2-PACK) 1 MG/0.2ML Subcutaneous injection Inject 0.2 mL (1 mg total) into the skin once as needed for Low blood glucose. 1 each 0    Insulin Pen Needle 33G X 4 MM Does not apply Misc 1 each 4 (four) times daily. 200 each 1    albuterol (2.5 MG/3ML) 0.083% Inhalation Nebu Soln Take 3 mL (2.5 mg total) by nebulization every 4 (four) hours as needed for Wheezing or Shortness of Breath. (Patient not taking: Reported on 2024) 360 mL 3    HYDROcodone-acetaminophen 5-325 MG Oral Tab 1 tablet by Per G Tube route every 8 (eight) hours as needed. (Patient not taking: Reported on 2024) 90 tablet 0    Incontinence Supply Disposable (COMFORT PROTECT ADULT DIAPER/L) Does not apply Misc 1 Application daily. 30 each 3    Electronic Thermometer ([employer] DIGITAL THERMOMETER TEMPLE) Does not apply Misc Check temperature 1 each 0    insulin glargine (LANTUS SOLOSTAR) 100 UNIT/ML Subcutaneous Solution Pen-injector Inject 8 Units into the skin every morning. (Patient not taking: Reported on 2024) 15 mL 0    Insulin Pen Needle 32G X 4 MM Does not apply Misc Take as directed 200 each 3    Budesonide-Formoterol Fumarate (SYMBICORT) 160-4.5 MCG/ACT Inhalation Aerosol Inhale 2 puffs  into the lungs 2 (two) times daily. (Patient not taking: Reported on 12/6/2024) 3 each 3    Starch-Maltodextrin (THICK-IT) Oral Powd Pack Use as directed (Patient not taking: Reported on 12/6/2024) 200 each 3    albuterol (2.5 MG/3ML) 0.083% Inhalation Nebu Soln Take 3 mL (2.5 mg total) by nebulization in the morning and 3 mL (2.5 mg total) before bedtime. (Patient not taking: Reported on 12/3/2024)      lidocaine 4 % External Patch Place 1 patch onto the skin daily. (Patient not taking: Reported on 12/3/2024)      acetaminophen 500 MG Oral Tab Take 1 tablet (500 mg total) by mouth every 6 (six) hours as needed for Pain. (Patient not taking: Reported on 12/6/2024)      Cholecalciferol 125 MCG (5000 UT) Oral Tab Take 1 tablet (5,000 Units total) by mouth 2 (two) times daily. (Patient not taking: Reported on 12/3/2024)

## 2024-12-21 NOTE — PROGRESS NOTES
[facility]  part of [facility]    Progress Note    Ollie Hernández Patient Status:  Inpatient    1945 MRN [id number]   Location [facility] 5SW/SE Attending Dee Joyce,*   Hosp Day # 15 PCP Wili Parmar MD     Chief Complaint: feet hurt    Subjective:     Constitutional:  Positive for appetite change and fatigue.   Respiratory:  Positive for cough. Negative for choking.    Cardiovascular:  Negative for leg swelling.   Gastrointestinal:  Positive for abdominal pain, constipation and abdominal distention.   Genitourinary:  Negative for dysuria.   Musculoskeletal:  Positive for back pain.   Neurological:  Positive for weakness.   Psychiatric/Behavioral:  Negative for behavioral problems and decreased concentration. The patient is not hyperactive.        Objective:   Blood pressure (!) 167/67, pulse 74, temperature 98 °F (36.7 °C), temperature source Oral, resp. rate 18, height 5' 4\" (1.626 m), weight 166 lb (75.3 kg), SpO2 95%.  Physical Exam  Vitals and nursing note reviewed.   HENT:      Head: Normocephalic and atraumatic.   Cardiovascular:      Rate and Rhythm: Normal rate.   Pulmonary:      Breath sounds: Rhonchi present.   Skin:     General: Skin is warm and dry.      Capillary Refill: Capillary refill takes less than 2 seconds.   Neurological:      General: No focal deficit present.      Mental Status: Mental status is at baseline.   Psychiatric:         Behavior: Behavior normal.         Results:   Lab Results   Component Value Date    WBC 12.8 (H) 2024    HGB 10.8 (L) 2024    HCT 34.0 (L) 2024    .0 2024    CREATSERUM 1.87 (H) 2024    BUN 25 (H) 2024     2024    K 3.4 (L) 2024     2024    CO2 30.0 2024     (H) 2024    CA 9.6 2024    ALB 3.4 2024    ALKPHO 236 (H) 2024    BILT 0.4 2024    TP 7.5 2024    AST 97 (H) 2024     (H)  12/20/2024    PTT 30.1 08/09/2024    INR 1.45 (H) 08/09/2024    T4F 0.9 08/31/2022    TSH 2.844 07/04/2024     (H) 07/30/2024    PSA 2.94 10/20/2021    DDIMER 6.07 (H) 09/29/2024    ESRML 10 06/16/2024    CRP 1.30 (H) 06/16/2024    MG 2.3 12/21/2024    PHOS 1.9 (L) 12/21/2024    TROP <0.045 07/25/2019    TROPHS 11 12/16/2024     08/05/2023    B12 672 07/04/2024       XR CHEST AP PORTABLE  (CPT=71045)    Result Date: 12/20/2024  CONCLUSION:   Again, increased size of right pleural effusion, now large.  Increased associated right mid to lower lung alveolar opacity, may represent combination of atelectasis, alveolar edema, and/or superimposed infection.  Increased interstitial markings bilaterally again noted.  Could be secondary to interstitial edema and/or interstitial infection.    Dictated by (CST): Apple Pierre MD on 12/20/2024 at 7:12 PM     Finalized by (CST): Apple Pierre MD on 12/20/2024 at 7:16 PM               Assessment & Plan:     Peripheral polyneuropathy; checked rpr ok; thyroid; electropheresis; b12 ok 7/24  -Patient p/w bilateral lower extremity pain  -Significantly worse over the past 3 to 4 days.  -No real improvement with gabapentin at home  -Difficulties with ambulation because of pain  -Pain control as able; pt screaming in pain or asleep; pain clinic saw; await mri  poss steroid   -PT/OT  -Continue to monitor  --increased confusion and hypoxia on p.o. narcotics, will discontinue all narcotics at this time, if pain ensues consider PCA for better autoregulation.  Has not needed them so far.  - WILLIAM on 12/17/2024 which is when he would be 7 days away from Plavix.  Appreciate pain service; ok to restart palvix     Acute respiratory failure, confusion/hypoxia: Significant event 12/14/2020/probable aspiration pneumonia  Possibly secondary to narcotics although concern for aspiration versus pneumonia  Chest x-ray consistent with some pulmonary edema and questionable underlying  pneumonia  CT head no acute findings  ABG reviewed no CO2 narcosis  IV Unasyn initiated  O2 support as needed, wean as able  Continue to monitor     ESRD on HD  -Normally receives dialysis on Monday Wednesday Friday.  .  -Nephrology consulted, further HD per nephrology.had dialysis 12/18     Anemia of chronic renal disease and iron defy   -checked stool for blood, neg;  -Likely secondary to ESRD as above  -Hemoglobin noted to be low transfused again 12/18  -Continue to monitor      Afib :   has watchchad romano put in in 9/2024; mri ok with watchman per dr coffey  12/13: Patient had episode of asymptomatic transient RVR self-limited.  12/16; 12/20: Again RVR during dialysis, reach out to cardiology for any further medication adjustments.    Beta-blocker increased, currently rate controlled appreciate cardiology recommendations        Type 2 DM:  - Monitoring Blood glucose with POC checks  - SSI to cover hyperglycemia  - Hypoglycemia protocol  - Will monitor and adjust agents as needed.  -Patient has appoint with Dr. Sevilla on 12/17/2024.  If blood sugars increase after steroid injection will reach out to endocrinology if not patient needs to reschedule since there is no urgent reason for consult at this time.             -KRAIG  -Hypertension  -Chronic dCHF  -Pulm HTN    Adb bloating and constip from narcs           Patient will require inpatient services that will reasonably be expected to span two midnight's based on the clinical documentation in H+P.   Based on patients current state of illness, I anticipate that, after discharge, patient will require jefferson    Complex mdm; coordinating care with nurse/dr khan and counseling pt and with his permission his wife  about pain poss dc monday      BRITTANY WAY MD

## 2024-12-21 NOTE — PROGRESS NOTES
[facility]  part of [facility]    Progress Note    Ollie Hernández Patient Status:  Inpatient    1945 MRN [id number]   Location [facility] 5SW/SE Attending Dee Joyce,*   Hosp Day # 15 PCP Wili Parmar MD       Subjective:   Ollie Hernández is a(n) 79 year old male who I am seeing for ESRD    No new issues  Dialysis yesterday    Objective:   BP (!) 167/67 (BP Location: Left arm)   Pulse 74   Temp 98 °F (36.7 °C) (Oral)   Resp 18   Ht 5' 4\" (1.626 m)   Wt 166 lb (75.3 kg)   SpO2 95%   BMI 28.49 kg/m²      Intake/Output Summary (Last 24 hours) at 2024 1207  Last data filed at 2024 1012  Gross per 24 hour   Intake 1394 ml   Output 1000 ml   Net 394 ml     Wt Readings from Last 1 Encounters:   24 166 lb (75.3 kg)       Exam  Vital signs: Blood pressure (!) 167/67, pulse 74, temperature 98 °F (36.7 °C), temperature source Oral, resp. rate 18, height 5' 4\" (1.626 m), weight 166 lb (75.3 kg), SpO2 95%.    General: No acute distress. Alert and oriented x 3.  HEENT: Moist mucous membranes. EOMI. PERRLA  Neck:  No JVD.   Respiratory: Clear to auscultation bilaterally.    Cardiovascular: S1, S2.  Regular rate and rhythm.   Abdomen: Soft, nontender, nondistended.    Neurologic: No focal neurological deficits.  Musculoskeletal: Full range of motion of all extremities.  No swelling noted.  Access:    Results:     Recent Labs   Lab 24  0540 24  0532 24  0657   RBC 3.36* 3.47* 3.51*   HGB 10.0* 10.7* 10.8*   HCT 32.6* 33.5* 34.0*   MCV 97.0 96.5 96.9   MCH 29.8 30.8 30.8   MCHC 30.7* 31.9 31.8   RDW 17.9* 17.8* 17.5*   NEPRELIM 11.01* 11.14* 10.20*   WBC 13.5* 13.8* 12.8*   .0 283.0 286.0         Recent Labs   Lab 24  0540 24  0532 24  1746   * 138* 227*   BUN 55* 37* 25*   CREATSERUM 3.69* 2.73* 1.87*   CA 10.1 10.2 9.6   * 137 138   K 4.4 4.1 3.4*   CL 96* 97* 100   CO2 31.0 31.0 30.0          XR  CHEST AP PORTABLE  (CPT=71045)    Result Date: 12/20/2024  CONCLUSION:   Again, increased size of right pleural effusion, now large.  Increased associated right mid to lower lung alveolar opacity, may represent combination of atelectasis, alveolar edema, and/or superimposed infection.  Increased interstitial markings bilaterally again noted.  Could be secondary to interstitial edema and/or interstitial infection.    Dictated by (CST): Apple Pierre MD on 12/20/2024 at 7:12 PM     Finalized by (CST): Apple Pierre MD on 12/20/2024 at 7:16 PM           Assessment and Plan:     Impression:    ESRD, HD schedule Monday Wednesday Friday  Hypertension with ESRD on metoprolol  A-fib with RVR, has Watchman.  And on amiodarone, cardiology is following  CAD on aspirin Plavix and beta-blocker  Aspiration pneumonia on antibiotic  Anemia on VANCE  Lower extremity pain, pain meds and physical therapy      Plan:    Dialysis on Monday prior to DC to rehab  Continue VANCE    Thank you very much for allowing me to participate in the care of your patient.  If you have any questions, please do not hesitate to contact me.     Moni Pugh MD  12/21/2024

## 2024-12-21 NOTE — PLAN OF CARE
Patient VSS with no acute changes during shift. Fluid and electrolyte imbalances educated by Dagoberto RN and reinforced by this RN. Safety precautions in place with call light in reach.     Problem: Patient Centered Care  Goal: Patient preferences are identified and integrated in the patient's plan of care  Description: Interventions:  - What would you like us to know as we care for you? I am from home with my wife  - Provide timely, complete, and accurate information to patient/family  - Incorporate patient and family knowledge, values, beliefs, and cultural backgrounds into the planning and delivery of care  - Encourage patient/family to participate in care and decision-making at the level they choose  - Honor patient and family perspectives and choices  Outcome: Progressing     Problem: Diabetes/Glucose Control  Goal: Glucose maintained within prescribed range  Description: INTERVENTIONS:  - Monitor Blood Glucose as ordered  - Assess for signs and symptoms of hyperglycemia and hypoglycemia  - Administer ordered medications to maintain glucose within target range  - Assess barriers to adequate nutritional intake and initiate nutrition consult as needed  - Instruct patient on self management of diabetes  Outcome: Progressing     Problem: Patient/Family Goals  Goal: Patient/Family Long Term Goal  Description: Patient's Long Term Goal: Discharge from the hospital    Interventions:  - Monitor vital signs  - Monitor appropriate labs  - Monitor blood glucose levels  - Pain management  - Administer medications per order  - Follow MD orders  - Diagnostics per order  - Update / inform patient and family on plan of care  - Discharge planning  - See additional Care Plan goals for specific interventions  Outcome: Progressing  Goal: Patient/Family Short Term Goal  Description: Patient's Short Term Goal: Improve bilateral feet pain    Interventions:   - Monitor vital signs  - Monitor appropriate labs  - Monitor blood glucose  levels  - Pain management  - Administer medications per order  - Follow MD orders  - Diagnostics per order  - Update / inform patient and family on plan of care  - See additional Care Plan goals for specific interventions  Outcome: Progressing     Problem: MUSCULOSKELETAL - ADULT  Goal: Return mobility to safest level of function  Description: INTERVENTIONS:  - Assess patient stability and activity tolerance for standing, transferring and ambulating w/ or w/o assistive devices  - Assist with transfers and ambulation using safe patient handling equipment as needed  - Ensure adequate protection for wounds/incisions during mobilization  - Obtain PT/OT consults as needed  - Advance activity as appropriate  - Communicate ordered activity level and limitations with patient/family  Outcome: Progressing     Problem: PAIN - ADULT  Goal: Verbalizes/displays adequate comfort level or patient's stated pain goal  Description: INTERVENTIONS:  - Encourage pt to monitor pain and request assistance  - Assess pain using appropriate pain scale  - Administer analgesics based on type and severity of pain and evaluate response  - Implement non-pharmacological measures as appropriate and evaluate response  - Consider cultural and social influences on pain and pain management  - Manage/alleviate anxiety  - Utilize distraction and/or relaxation techniques  - Monitor for opioid side effects  - Notify MD/LIP if interventions unsuccessful or patient reports new pain  - Anticipate increased pain with activity and pre-medicate as appropriate  Outcome: Progressing     Problem: RISK FOR INFECTION - ADULT  Goal: Absence of fever/infection during anticipated neutropenic period  Description: INTERVENTIONS  - Monitor WBC  - Administer growth factors as ordered  - Implement neutropenic guidelines  Outcome: Progressing     Problem: SAFETY ADULT - FALL  Goal: Free from fall injury  Description: INTERVENTIONS:  - Assess pt frequently for physical needs  -  Identify cognitive and physical deficits and behaviors that affect risk of falls.  - [place] fall precautions as indicated by assessment.  - Educate pt/family on patient safety including physical limitations  - Instruct pt to call for assistance with activity based on assessment  - Modify environment to reduce risk of injury  - Provide assistive devices as appropriate  - Consider OT/PT consult to assist with strengthening/mobility  - Encourage toileting schedule  Outcome: Progressing     Problem: DISCHARGE PLANNING  Goal: Discharge to home or other facility with appropriate resources  Description: INTERVENTIONS:  - Identify barriers to discharge w/pt and caregiver  - Include patient/family/discharge partner in discharge planning  - Arrange for needed discharge resources and transportation as appropriate  - Identify discharge learning needs (meds, wound care, etc)  - Arrange for interpreters to assist at discharge as needed  - Consider post-discharge preferences of patient/family/discharge partner  - Complete POLST form as appropriate  - Assess patient's ability to be responsible for managing their own health  - Refer to Case Management Department for coordinating discharge planning if the patient needs post-hospital services based on physician/LIP order or complex needs related to functional status, cognitive ability or social support system  Outcome: Progressing     Problem: METABOLIC/FLUID AND ELECTROLYTES - ADULT  Goal: Glucose maintained within prescribed range  Description: INTERVENTIONS:  - Monitor Blood Glucose as ordered  - Assess for signs and symptoms of hyperglycemia and hypoglycemia  - Administer ordered medications to maintain glucose within target range  - Assess barriers to adequate nutritional intake and initiate nutrition consult as needed  - Instruct patient on self management of diabetes  Outcome: Progressing  Goal: Electrolytes maintained within normal limits  Description: INTERVENTIONS:  -  Monitor labs and rhythm and assess patient for signs and symptoms of electrolyte imbalances  - Administer electrolyte replacement as ordered  - Monitor response to electrolyte replacements, including rhythm and repeat lab results as appropriate  - Fluid restriction as ordered  - Instruct patient on fluid and nutrition restrictions as appropriate  Outcome: Progressing  Goal: Hemodynamic stability and optimal renal function maintained  Description: INTERVENTIONS:  - Monitor labs and assess for signs and symptoms of volume excess or deficit  - Monitor intake, output and patient weight  - Monitor urine specific gravity, serum osmolarity and serum sodium as indicated or ordered  - Monitor response to interventions for patient's volume status, including labs, urine output, blood pressure (other measures as available)  - Encourage oral intake as appropriate  - Instruct patient on fluid and nutrition restrictions as appropriate  Outcome: Progressing

## 2024-12-22 LAB
ANION GAP SERPL CALC-SCNC: 7 MMOL/L (ref 0–18)
BASOPHILS # BLD AUTO: 0.06 X10(3) UL (ref 0–0.2)
BASOPHILS NFR BLD AUTO: 0.5 %
BUN BLD-MCNC: 43 MG/DL (ref 9–23)
BUN/CREAT SERPL: 13.9 (ref 10–20)
CALCIUM BLD-MCNC: 9.7 MG/DL (ref 8.7–10.4)
CHLORIDE SERPL-SCNC: 99 MMOL/L (ref 98–112)
CO2 SERPL-SCNC: 27 MMOL/L (ref 21–32)
CREAT BLD-MCNC: 3.1 MG/DL
DEPRECATED RDW RBC AUTO: 59.4 FL (ref 35.1–46.3)
EGFRCR SERPLBLD CKD-EPI 2021: 20 ML/MIN/1.73M2 (ref 60–?)
EOSINOPHIL # BLD AUTO: 0.21 X10(3) UL (ref 0–0.7)
EOSINOPHIL NFR BLD AUTO: 1.6 %
ERYTHROCYTE [DISTWIDTH] IN BLOOD BY AUTOMATED COUNT: 17.2 % (ref 11–15)
GLUCOSE BLD-MCNC: 101 MG/DL (ref 70–99)
GLUCOSE BLDC GLUCOMTR-MCNC: 131 MG/DL (ref 70–99)
GLUCOSE BLDC GLUCOMTR-MCNC: 171 MG/DL (ref 70–99)
GLUCOSE BLDC GLUCOMTR-MCNC: 195 MG/DL (ref 70–99)
GLUCOSE BLDC GLUCOMTR-MCNC: 211 MG/DL (ref 70–99)
GLUCOSE BLDC GLUCOMTR-MCNC: 304 MG/DL (ref 70–99)
HCT VFR BLD AUTO: 31.1 %
HGB BLD-MCNC: 10.1 G/DL
IMM GRANULOCYTES # BLD AUTO: 0.11 X10(3) UL (ref 0–1)
IMM GRANULOCYTES NFR BLD: 0.8 %
LYMPHOCYTES # BLD AUTO: 1.12 X10(3) UL (ref 1–4)
LYMPHOCYTES NFR BLD AUTO: 8.6 %
MAGNESIUM SERPL-MCNC: 2.3 MG/DL (ref 1.6–2.6)
MCH RBC QN AUTO: 30.9 PG (ref 26–34)
MCHC RBC AUTO-ENTMCNC: 32.5 G/DL (ref 31–37)
MCV RBC AUTO: 95.1 FL
MONOCYTES # BLD AUTO: 0.96 X10(3) UL (ref 0.1–1)
MONOCYTES NFR BLD AUTO: 7.4 %
NEUTROPHILS # BLD AUTO: 10.51 X10 (3) UL (ref 1.5–7.7)
NEUTROPHILS # BLD AUTO: 10.51 X10(3) UL (ref 1.5–7.7)
NEUTROPHILS NFR BLD AUTO: 81.1 %
OSMOLALITY SERPL CALC.SUM OF ELEC: 287 MOSM/KG (ref 275–295)
PHOSPHATE SERPL-MCNC: 2.6 MG/DL (ref 2.4–5.1)
PLATELET # BLD AUTO: 301 10(3)UL (ref 150–450)
POTASSIUM SERPL-SCNC: 3.7 MMOL/L (ref 3.5–5.1)
RBC # BLD AUTO: 3.27 X10(6)UL
SODIUM SERPL-SCNC: 133 MMOL/L (ref 136–145)
WBC # BLD AUTO: 13 X10(3) UL (ref 4–11)

## 2024-12-22 PROCEDURE — 99233 SBSQ HOSP IP/OBS HIGH 50: CPT | Performed by: HOSPITALIST

## 2024-12-22 PROCEDURE — 99232 SBSQ HOSP IP/OBS MODERATE 35: CPT | Performed by: INTERNAL MEDICINE

## 2024-12-22 RX ORDER — METOPROLOL TARTRATE 50 MG
150 TABLET ORAL
Status: DISCONTINUED | OUTPATIENT
Start: 2024-12-22 | End: 2024-12-23

## 2024-12-22 RX ORDER — METOPROLOL TARTRATE 1 MG/ML
5 INJECTION, SOLUTION INTRAVENOUS ONCE
Status: COMPLETED | OUTPATIENT
Start: 2024-12-22 | End: 2024-12-22

## 2024-12-22 RX ORDER — METOPROLOL TARTRATE 1 MG/ML
5 INJECTION, SOLUTION INTRAVENOUS AS NEEDED
Status: DISCONTINUED | OUTPATIENT
Start: 2024-12-22 | End: 2024-12-22

## 2024-12-22 RX ORDER — LANSOPRAZOLE 30 MG/1
30 TABLET, ORALLY DISINTEGRATING, DELAYED RELEASE ORAL
Status: DISCONTINUED | OUTPATIENT
Start: 2024-12-22 | End: 2024-12-23

## 2024-12-22 RX ORDER — AMIODARONE HYDROCHLORIDE 200 MG/1
200 TABLET ORAL 2 TIMES DAILY WITH MEALS
Status: DISCONTINUED | OUTPATIENT
Start: 2024-12-22 | End: 2024-12-23

## 2024-12-22 RX ORDER — ALBUMIN (HUMAN) 12.5 G/50ML
25 SOLUTION INTRAVENOUS
Status: DISCONTINUED | OUTPATIENT
Start: 2024-12-22 | End: 2024-12-23

## 2024-12-22 RX ORDER — METOPROLOL TARTRATE 1 MG/ML
5 INJECTION, SOLUTION INTRAVENOUS EVERY 6 HOURS PRN
Status: DISCONTINUED | OUTPATIENT
Start: 2024-12-22 | End: 2024-12-23

## 2024-12-22 RX ORDER — DILTIAZEM HYDROCHLORIDE 30 MG/1
60 TABLET, FILM COATED ORAL EVERY 8 HOURS SCHEDULED
Status: DISCONTINUED | OUTPATIENT
Start: 2024-12-22 | End: 2024-12-23

## 2024-12-22 RX ORDER — METOPROLOL TARTRATE 1 MG/ML
5 INJECTION, SOLUTION INTRAVENOUS EVERY 6 HOURS
Status: DISCONTINUED | OUTPATIENT
Start: 2024-12-22 | End: 2024-12-22

## 2024-12-22 RX ORDER — METOPROLOL TARTRATE 1 MG/ML
2.5 INJECTION, SOLUTION INTRAVENOUS AS NEEDED
Status: DISCONTINUED | OUTPATIENT
Start: 2024-12-22 | End: 2024-12-22

## 2024-12-22 NOTE — PROGRESS NOTES
[facility]  part of [facility]    Progress Note    Ollie Hernández Patient Status:  Inpatient    1945 MRN [id number]   Location [facility] 5SW/SE Attending Dee Joyce,*   Hosp Day # 16 PCP Wili Parmar MD     Chief Complaint: gas    Subjective:     Constitutional:  Positive for appetite change and fatigue.   Respiratory:  Positive for cough. Negative for choking.    Cardiovascular:  Negative for leg swelling.   Gastrointestinal:  Positive for abdominal pain, constipation and abdominal distention.   Genitourinary:  Negative for dysuria.   Musculoskeletal:  Positive for back pain.   Neurological:  Positive for weakness.   Psychiatric/Behavioral:  Negative for behavioral problems and decreased concentration. The patient is not hyperactive.        Objective:   Blood pressure 142/57, pulse (!) 137, temperature 98.9 °F (37.2 °C), temperature source Oral, resp. rate 24, height 5' 4\" (1.626 m), weight 171 lb 8.3 oz (77.8 kg), SpO2 98%.  Physical Exam  Vitals and nursing note reviewed.   HENT:      Head: Normocephalic and atraumatic.   Cardiovascular:      Rate and Rhythm: Normal rate.   Pulmonary:      Breath sounds: Rhonchi present.   Skin:     General: Skin is warm and dry.      Capillary Refill: Capillary refill takes less than 2 seconds.   Neurological:      General: No focal deficit present.      Mental Status: Mental status is at baseline.   Psychiatric:         Behavior: Behavior normal.         Results:   Lab Results   Component Value Date    WBC 13.0 (H) 2024    HGB 10.1 (L) 2024    HCT 31.1 (L) 2024    .0 2024    CREATSERUM 3.10 (H) 2024    BUN 43 (H) 2024     (L) 2024    K 3.7 2024    CL 99 2024    CO2 27.0 2024     (H) 2024    CA 9.7 2024    ALB 3.4 2024    ALKPHO 236 (H) 2024    BILT 0.4 2024    TP 7.5 2024    AST 97 (H) 2024      (H) 12/20/2024    PTT 30.1 08/09/2024    INR 1.45 (H) 08/09/2024    T4F 0.9 08/31/2022    TSH 2.844 07/04/2024     (H) 07/30/2024    PSA 2.94 10/20/2021    DDIMER 6.07 (H) 09/29/2024    ESRML 10 06/16/2024    CRP 1.30 (H) 06/16/2024    MG 2.3 12/22/2024    PHOS 2.6 12/22/2024    TROP <0.045 07/25/2019    TROPHS 11 12/16/2024     08/05/2023    B12 672 07/04/2024       XR CHEST AP PORTABLE  (CPT=71045)    Result Date: 12/20/2024  CONCLUSION:   Again, increased size of right pleural effusion, now large.  Increased associated right mid to lower lung alveolar opacity, may represent combination of atelectasis, alveolar edema, and/or superimposed infection.  Increased interstitial markings bilaterally again noted.  Could be secondary to interstitial edema and/or interstitial infection.    Dictated by (CST): Apple Pierre MD on 12/20/2024 at 7:12 PM     Finalized by (CST): Apple Pierre MD on 12/20/2024 at 7:16 PM               Assessment & Plan:     Peripheral polyneuropathy; checked rpr ok; thyroid; electropheresis; b12 ok 7/24  -Patient p/w bilateral lower extremity pain  -Significantly worse over the past 3 to 4 days.  -No real improvement with gabapentin at home  -Difficulties with ambulation because of pain  -Pain control as able; pt screaming in pain or asleep; pain clinic saw; await mri  poss steroid   -PT/OT  -Continue to monitor  --increased confusion and hypoxia on p.o. narcotics, will discontinue all narcotics at this time, if pain ensues consider PCA for better autoregulation.  Has not needed them so far.  - WILLIAM on 12/17/2024 which is when he would be 7 days away from Plavix.  Appreciate pain service; ok to restart palvix     Acute respiratory failure, confusion/hypoxia: Significant event 12/14/2020/probable aspiration pneumonia  Possibly secondary to narcotics although concern for aspiration versus pneumonia  Chest x-ray consistent with some pulmonary edema and questionable underlying  pneumonia  CT head no acute findings  ABG reviewed no CO2 narcosis  IV Unasyn initiated  O2 support as needed, wean as able  Continue to monitor     ESRD on HD  -Normally receives dialysis on Monday Wednesday Friday.  .  -Nephrology consulted, further HD per nephrology.had dialysis 12/18     Anemia of chronic renal disease and iron defy   -checked stool for blood, neg;  -Likely secondary to ESRD as above  -Hemoglobin noted to be low transfused again 12/18  -Continue to monitor      Afib :   has watchchad romano put in in 9/2024; mri ok with bel per dr coffey  12/13: Patient had episode of asymptomatic transient RVR self-limited.  12/16; 12/20: Again RVR during dialysis, reach out to cardiology for any further medication adjustments.    Beta-blocker increased, currently rate better appreciate cardiology recommendations        Type 2 DM:  - Monitoring Blood glucose with POC checks  - SSI to cover hyperglycemia  - Hypoglycemia protocol  - Will monitor and adjust agents as needed.  -Patient has appoint with Dr. Sevilla on 12/17/2024.  If blood sugars increase after steroid injection will reach out to endocrinology if not patient needs to reschedule since there is no urgent reason for consult at this time.             -KRAIG  -Hypertension  -Chronic dCHF  -Pulm HTN    Adb bloating and constip from narcs           Patient will require inpatient services that will reasonably be expected to span two midnight's based on the clinical documentation in H+P.   Based on patients current state of illness, I anticipate that, after discharge, patient will require jefferson    Complex mdm; coordinating care with nurse/dr khan and counseling pt and with his permission his daughterinroom about pain poss dc monday      BRITTANY WAY MD

## 2024-12-22 NOTE — PROGRESS NOTES
Cardiology Progress Note  [facility]    Ollie Hernández Patient Status:  Inpatient    1945 MRN [id number]   Location [facility] 5SW/SE Attending Lizbeth Rey MD   Hosp Day # 16 PCP Wili Parmar MD     Primary Cardiologist: Dr. Phelan    Reason for Consultation:  Afib    Subjective:  Doing well. No CV complaints. Getting dialysis this morning. He has paroxysmal episodes of afib with RVR, which self-terminate. He went back into afib during dialysis yesterday    Feels injection helped w/ more foot movement  in / out of AF  Pt can't tell     Assessment/Plan:  Afib s/p Watchman 24 (24mm Watchman FLX ). AF also likely precipitated by volume overload and possible infection.  CAD s/p PCI LCX with Ramin Dunklin ABIMBOLA x 1 (24)   HFpEF  ESRD on iHD  T2DM  KRAIG  HTN // HL   Polyneuropathy,    - Restarted DAPT .  - metoprolol to 100mg BID. HR okay.  - IV metoprolol 5mg prn if needed during dialysis if rates > 130 bpm.  - continue amio 200mg daily. If patient continues to have episodes of RVR, can increase to amio 200mg TID. Overall, his episodes of RVR are self-limited and he is asymptomatic. Reviewed w/family at bedside potential SE of Amiodarone and need for F/ U  to screen for SE   add diltiazem rebolus Amio w/ RVR and recurrence of A Flutter       - TTE reviewed. RVSP 62 mmHg.   - RVSP 62 mmHg and right pleural effusion- recommend further dialysis and challenge dry weight.  - Tele   - Statin   - Will continue to follow.    History:  Past Medical History:    Anemia    Asthma (HCC)    Back problem    BPH (benign prostatic hyperplasia)    Calculus of kidney    Cataract    Coronary atherosclerosis    Diabetes (HCC)    ESRD (end stage renal disease) on dialysis (HCC)    Essential hypertension    High blood pressure    High cholesterol    History of blood transfusion    Hyperlipidemia    Neuropathy    hands and feet    KRAIG on CPAP    Renal disorder    Sleep apnea    Visual impairment     glasses    Vocal cord paralysis, unilateral partial     Past Surgical History:   Procedure Laterality Date    Appendectomy      1981    Back surgery      Neck/back - 1998    Capsule endoscopy - internal referral      Cataract extraction Right 01/08/2022    PHACOEMULSIFICATION WITH POSTERIOR CHAMBER INTRAOCULAR LENS, RIGHT EYE    Cataract extraction Left 12/21/2021    PHACOEMULSIFICATION WITH POSTERIOR CHAMBER INTRAOCULAR LENS, LEFT EYE    Cath bare metal stent (bms)      Cath drug eluting stent  05/21/2024    Successful IVUS guided PCI of the circumflex with a ABIMBOLA    Colonoscopy      Colonoscopy N/A 01/25/2021    Procedure: COLONOSCOPY;  Surgeon: Michael Gautam MD;  Location: [facility] ENDO    Colonoscopy N/A 06/03/2024    Procedure: COLONOSCOPY;  Surgeon: Gabriel Saldana MD;  Location: [facility] ENDOSCOPY    Colonoscopy N/A 06/15/2024    Procedure: COLONOSCOPY;  Surgeon: Carlyn Storey MD;  Location: [facility] ENDOSCOPY    Colonoscopy N/A 07/31/2024    Procedure: COLONOSCOPY;  Surgeon: Gabriel Saldana MD;  Location: [facility] ENDOSCOPY    Dialysis procedure  02/17/2023    right internal jugular tunneled dialysis catheter placement.    Hand/finger surgery unlisted      Accidental trauma    Spine surgery procedure unlisted      Total hip arthroplasty Left 10/04/2024    Left anterior total hip arthroplasty    Upper gi endoscopy,diagnosis       Family History   Problem Relation Age of Onset    Cancer Father         Kidney    Breast Cancer Mother     Diabetes Mother     Diabetes Maternal Grandmother     Diabetes Maternal Grandfather     Heart Disorder Other         Uncle      reports that he quit smoking about 44 years ago. His smoking use included cigarettes. He started smoking about 61 years ago. He has a 17 pack-year smoking history. He has never used smokeless tobacco. He reports that he does not drink alcohol and does not use drugs.    Allergies:  Allergies[1]    Medications:  Medications Ordered Prior to Encounter[2]        Physical  Exam:  Blood pressure 152/59, pulse 68, temperature 97.9 °F (36.6 °C), temperature source Oral, resp. rate 18, height 162.6 cm (5' 4\"), weight 171 lb 8.3 oz (77.8 kg), SpO2 99%.  Wt Readings from Last 3 Encounters:   12/22/24 171 lb 8.3 oz (77.8 kg)   11/30/24 145 lb (65.8 kg)   10/22/24 132 lb 14.4 oz (60.3 kg)       General: awake, alert, oriented x 3, no acute distress  HEENT: at/nc, perrl, eomi  Neck: No JVD, carotids 2+ no bruits.  Cardiac: tachy, regular S1, S2 normal, no rub or gallop. Soft grade ii/vi systolic murmur  Lungs: Clear without wheezes, rales, rhonchi or dullness.  Normal excursions and effort.  Abdomen: Soft, non-tender, non-distended, normal bowel sounds G-tube  Extremities: Without clubbing, cyanosis or edema.  Peripheral pulses are 2+. R arm fistula L hand missing digits  Neurologic: Alert and oriented, normal affect.  Psych: normal mood and affect  Skin: Warm and dry.       Laboratories and Data:  Diagnostics:      Labs:   CBC:    Lab Results   Component Value Date    WBC 13.0 (H) 12/22/2024    WBC 12.8 (H) 12/21/2024    WBC 13.8 (H) 12/20/2024     Lab Results   Component Value Date    HGB 10.1 (L) 12/22/2024    HGB 10.8 (L) 12/21/2024    HGB 10.7 (L) 12/20/2024      Lab Results   Component Value Date    .0 12/22/2024    .0 12/21/2024    .0 12/20/2024     BMP:   No results found for: \"GLUCOSE\"  Lab Results   Component Value Date    K 3.7 12/22/2024    K 3.4 (L) 12/20/2024    K 4.1 12/20/2024     Lab Results   Component Value Date    BUN 43 (H) 12/22/2024    BUN 25 (H) 12/20/2024    BUN 37 (H) 12/20/2024     Lab Results   Component Value Date    CREATSERUM 3.10 (H) 12/22/2024    CREATSERUM 1.87 (H) 12/20/2024    CREATSERUM 2.73 (H) 12/20/2024     Cholesterol:     Lab Results   Component Value Date    CHOLEST 179 09/29/2024    CHOLEST 146 07/31/2024    CHOLEST 153 07/04/2024     Lab Results   Component Value Date    HDL 41 09/29/2024    HDL 29 (L) 07/31/2024    HDL 41  2024     Lab Results   Component Value Date    TRIG 259 (H) 10/17/2024    TRIG 160 (H) 10/04/2024    TRIG 206 (H) 10/02/2024     Lab Results   Component Value Date     (H) 2024    LDL 93 2024     (H) 2024     Lab Results   Component Value Date    AST 97 (H) 2024    AST 32 2024    AST 42 (H) 2024     Lab Results   Component Value Date     (H) 2024    ALT 29 2024    ALT 38 2024              [1]   Allergies  Allergen Reactions    Adhesive Tape OTHER (SEE COMMENTS)     Severe rashes    Dust Mite Extract RASH   [2]   Current Facility-Administered Medications on File Prior to Encounter   Medication Dose Route Frequency Provider Last Rate Last Admin    [COMPLETED] acetaminophen (Tylenol) 160 MG/5ML oral liquid 500 mg  500 mg Per G Tube Once Gabriela Allen MD   500 mg at 24 0209    [COMPLETED] gabapentin (Neurontin) 250 MG/5ML oral solution 300 mg  300 mg Per G Tube Once Gabriela Allen MD   300 mg at 24 0440    [COMPLETED] ceFAZolin (Ancef) 2g in 10mL IV syringe premix  2 g Intravenous Once Yash Sheppard MD   2 g at 10/19/24 1036    [] adult 3 in 1 TPN   Intravenous Continuous TPN Pranay Michel MD   Paused at 10/19/24 0250    [COMPLETED] sodium chloride 0.9 % IV bolus 250 mL  250 mL Intravenous Once Lorelei Mata  mL/hr at 10/17/24 0023 250 mL at 10/17/24 0023    [] adult 3 in 1 TPN   Intravenous Continuous TPN Pranay Michel MD 50 mL/hr at 10/17/24 2233 New Bag at 10/17/24 2233    [COMPLETED] dilTIAZem (cardIZEM) 25 mg/5mL injection 10 mg  10 mg Intravenous Once Pranay Michel MD   10 mg at 10/17/24 1330    [COMPLETED] sodium chloride 0.9 % IV bolus 250 mL  250 mL Intravenous Once Pranay Michel  mL/hr at 10/17/24 1700 250 mL at 10/17/24 1700    [COMPLETED] amiodarone (Cordarone) 150 mg in dextrose 5% 100 mL IV bolus  150 mg Intravenous Once Emerson Julio  mL/hr at 10/17/24 1808  150 mg at 10/17/24 1805    [COMPLETED] digoxin (Lanoxin) 250 MCG/ML injection 250 mcg  250 mcg Intravenous Once Herman Phelan MD   250 mcg at 10/17/24 1750    [] sodium chloride 0.9 % IV bolus 100 mL  100 mL Intravenous Q30 Min PRN José Callahan MD        And    [] albumin human (Albumin) 25% injection 25 g  25 g Intravenous PRN Dialysis José Callahan MD        [] dextrose 5%-sodium chloride 0.45% infusion   Intravenous Continuous Lorelei Mata MD 50 mL/hr at 10/16/24 0038 New Bag at 10/16/24 0038    [] adult 3 in 1 TPN   Intravenous Continuous TPN Pranay Michel MD 50 mL/hr at 10/16/24 2118 New Bag at 10/16/24 2118    [COMPLETED] metoprolol (Lopressor) 5 mg/5mL injection 5 mg  5 mg Intravenous Once Herman Phelan MD   5 mg at 10/16/24 2307    [] sodium chloride 0.9 % IV bolus 100 mL  100 mL Intravenous Q30 Min PRN José Callahan MD        And    [] albumin human (Albumin) 25% injection 25 g  25 g Intravenous PRN Dialysis José Callahan MD        [] sodium chloride 0.9 % IV bolus 100 mL  100 mL Intravenous Q30 Min PRN Walker Klein MD        And    [] albumin human (Albumin) 25% injection 25 g  25 g Intravenous PRN Dialysis Walker Klein MD        [COMPLETED] metoprolol (Lopressor) 5 mg/5mL injection 5 mg  5 mg Intravenous Once Radha Dunham MD   5 mg at 10/12/24 0304    [COMPLETED] methylPREDNISolone sodium succinate (Solu-MEDROL) injection 40 mg  40 mg Intravenous BID Jayjay Mijares MD   40 mg at 10/13/24 0919    [] sodium chloride 0.9 % IV bolus 100 mL  100 mL Intravenous Q30 Min PRN Walker Klein MD        And    [] albumin human (Albumin) 25% injection 25 g  25 g Intravenous PRN Dialysis Walker Klein MD   25 g at 10/11/24 1900    [COMPLETED] methylPREDNISolone sodium succinate (Solu-MEDROL) injection 60 mg  60 mg Intravenous Once Jimmie Landeros MD   60 mg at 10/09/24 0148    [COMPLETED]  EPINEPHrine-racemic (S-2) 2.25 % nebulizer solution 0.5 mL  0.5 mL Nebulization Once Jimmie Landeros MD   0.5 mL at 10/09/24 0240    [] sodium chloride 0.9 % IV bolus 100 mL  100 mL Intravenous Q30 Min PRN Moni Pugh MD        And    [] albumin human (Albumin) 25% injection 25 g  25 g Intravenous PRN Dialysis Moni Pugh MD        [COMPLETED] methylPREDNISolone sodium succinate (Solu-MEDROL) injection 125 mg  125 mg Intravenous Once Gordon Borden MD   125 mg at 10/06/24 1823    [COMPLETED] EPINEPHrine-racemic (S-2) 2.25 % nebulizer solution 0.5 mL  0.5 mL Nebulization Once Ollie Santos MD   0.5 mL at 10/06/24 1812    [] methylPREDNISolone sodium succinate (Solu-MEDROL) 125 MG injection             [COMPLETED] potassium chloride (Klor-Con) 20 MEQ oral powder 20 mEq  20 mEq Oral Once Moni Pugh MD   20 mEq at 10/05/24 0556    [COMPLETED] fluconazole (Diflucan) tab 200 mg  200 mg Oral Once Lizbeth Rey MD   200 mg at 10/05/24 1610    [COMPLETED] potassium chloride (Klor-Con) 20 MEQ oral powder 10 mEq  10 mEq Oral Once Moni Pugh MD   10 mEq at 10/04/24 0736    [] ondansetron (Zofran) 4 MG/2ML injection 4 mg  4 mg Intravenous Once PRN Talha Gregg MD        [] prochlorperazine (Compazine) 10 MG/2ML injection 5 mg  5 mg Intravenous Once PRN Talha Gregg MD        [] haloperidol lactate (Haldol) 5 MG/ML injection 0.25 mg  0.25 mg Intravenous Once PRN Talha Gregg MD        [] fentaNYL (Sublimaze) 50 mcg/mL injection 25 mcg  25 mcg Intravenous Q5 Min PRN Talha Gregg MD   25 mcg at 10/04/24 1411    [] fentaNYL (Sublimaze) 50 mcg/mL injection 50 mcg  50 mcg Intravenous Q5 Min PRN Talha Gregg MD        [] HYDROmorphone (Dilaudid) 1 MG/ML injection 0.2 mg  0.2 mg Intravenous Q15 Min PRN Talha Gregg MD        [] HYDROmorphone (Dilaudid) 1 MG/ML injection 0.2 mg  0.2 mg Intravenous Q15 Min PRN Talha Gregg MD         [] HYDROmorphone (Dilaudid) 1 MG/ML injection 0.2 mg  0.2 mg Intravenous Q15 Min PRN Talha Gregg MD        [] atropine 0.1 MG/ML injection 0.5 mg  0.5 mg Intravenous PRN Talha Gregg MD        [] naloxone (Narcan) 0.4 MG/ML injection 0.08 mg  0.08 mg Intravenous PRN Talha Gregg MD        [] sodium chloride 0.9 % IV bolus 500 mL  500 mL Intravenous Once PRN Humberto Murphy MD        [] diphenhydrAMINE (Benadryl) 50 mg/mL  injection 25 mg  25 mg Intravenous Once PRN Humberto Murphy MD        [COMPLETED] ceFAZolin (Ancef) 1 g in dextrose 5% 100mL IVPB-ADD  1 g Intravenous Q24H Humberto Murphy  mL/hr at 10/05/24 0833 1 g at 10/05/24 0833    [COMPLETED] clonidine-EPINEPHrine-ropivacaine-ketorolac (CERTS) (Duraclon-Adrenalin-Naropin-Toradol) pain cocktail irrigation   Intra-articular Once (Intra-Op) Humberto Murphy MD   Given at 10/04/24 1211    [] sodium chloride 0.9 % IV bolus 100 mL  100 mL Intravenous Q30 Min PRN Humberto Murphy MD        And    [] albumin human (Albumin) 25% injection 25 g  25 g Intravenous PRN Dialysis Humberto Murphy MD        [COMPLETED] tranexamic acid in sodium chloride 0.7% (Cyklokapron) 1000 mg/100mL infusion premix 1,000 mg  1,000 mg Intravenous 30 Min Pre-Op Humberto Murphy MD   1,000 mg at 10/04/24 1149    [COMPLETED] potassium chloride (Klor-Con) 20 MEQ oral powder 20 mEq  20 mEq Oral Once Mirta Redman MD   20 mEq at 10/02/24 2024    [COMPLETED] sodium chloride 0.9 % IV bolus 250 mL  250 mL Intravenous Once Nora Jones APRN   Stopped at 10/01/24 1150    [] sodium chloride 0.9 % IV bolus 100 mL  100 mL Intravenous Q30 Min PRN Moni Pugh MD        And    [] albumin human (Albumin) 25% injection 25 g  25 g Intravenous PRN Dialysis Moni Pugh MD        [] piperacillin-tazobactam (Zosyn) 3.375 g in dextrose 5% 100 mL IVPB-ADDV  3.375 g Intravenous Q12H Jimmie Landeros MD  25 mL/hr at 10/04/24 1900 3.375 g at 10/04/24 1900    [COMPLETED] iopamidol 76% (ISOVUE-370) injection for power injector  80 mL Intravenous ONCE PRN Radha Gabriel MD   80 mL at 24 1126    [COMPLETED] furosemide (Lasix) 10 mg/mL injection 40 mg  40 mg Intravenous Once Nick Moreau MD   40 mg at 24 0607    [] furosemide (Lasix) 10 mg/mL injection             [COMPLETED] hydrALAzine (Apresoline) 20 mg/mL injection 10 mg  10 mg Intravenous Once Nick Moreau MD   10 mg at 24 0626    [] etomidate (Amidate) 2 mg/mL injection             [COMPLETED] propofol (Diprivan) 10 MG/ML injection        1,000 mg at 24 1436    [COMPLETED] piperacillin-tazobactam (Zosyn) 4.5 g in dextrose 5% 100 mL IVPB-ADDV  4.5 g Intravenous Once Nick Moreau  mL/hr at 24 0853 4.5 g at 24 0853    [] sodium chloride 0.9 % IV bolus 100 mL  100 mL Intravenous Q30 Min PRN José Callahan MD        [COMPLETED] nitroGLYCERIN (Nitrobid) 2 % ointment 1 inch  1 inch Topical Once Nick Moreau MD   1 inch at 24 0654    [COMPLETED] hydrALAzine (Apresoline) 20 mg/mL injection 10 mg  10 mg Intravenous Once Nick Moreau MD   10 mg at 24 0654    [COMPLETED] ondansetron (Zofran) 4 MG/2ML injection        4 mg at 24 1241    [COMPLETED] metoclopramide (Reglan) 5 mg/mL injection 5 mg  5 mg Intravenous Once Karla Irvin APRN   5 mg at 24 1300    [COMPLETED] norepinephrine (Levophed) 4 mg/250mL infusion premix        4,000 mcg at 24 1455    [COMPLETED] succinylcholine (Anectine) 20 MG/ML injection 70.6 mg  1 mg/kg Intravenous Once Atul Sheridan MD   70.6 mg at 24 1445    [COMPLETED] HYDROcodone-acetaminophen (Norco) 5-325 MG per tab 1 tablet  1 tablet Oral Once Vitor Kline MD   1 tablet at 24 1245    [COMPLETED] morphINE PF 4 MG/ML injection 4 mg  4 mg Intravenous Once Vitor Kline MD   4 mg at 24 1448    [COMPLETED] sodium  chloride 0.9 % IV bolus 1,000 mL  1,000 mL Intravenous Once Vitor Kline MD 1,000 mL/hr at 24 1447 1,000 mL at 24 1447    [COMPLETED] ondansetron (Zofran) 4 MG/2ML injection 4 mg  4 mg Intravenous Once Vitor Kline MD   4 mg at 24 1448    [COMPLETED] ceFAZolin (Ancef) 2g in 10mL IV syringe premix  2 g Intravenous 30 Min Pre-Op Humberto Murphy MD   2 g at 10/04/24 0915    [COMPLETED] heparin (Porcine) 5000 UNIT/ML injection 5,000 Units  5,000 Units Subcutaneous Once Humberto Murphy MD   5,000 Units at 24 1758    [COMPLETED] dilTIAZem (cardIZEM) 25 mg/5mL injection 20 mg  20 mg Intravenous Once Vitor Kline MD   20 mg at 24 1619     Current Outpatient Medications on File Prior to Encounter   Medication Sig Dispense Refill    acetaminophen 500 MG Oral Tab 1 tablet (500 mg total) by Per G Tube route in the morning and 1 tablet (500 mg total) before bedtime.      Gabapentin 300 MG/6ML Oral Solution 300 mg by Peg Tube route in the morning and 300 mg before bedtime. 450 mL 3    linaGLIPtin (TRADJENTA) 5 mg Oral Tab 1 tablet (5 mg total) by Per G Tube route daily. 90 tablet 3    atorvastatin 40 MG Oral Tab 1 tablet (40 mg total) by Per G Tube route nightly. 90 tablet 3    amiodarone 200 MG Oral Tab 1 tablet (200 mg total) by Per G Tube route daily. 90 tablet 3    carvedilol 25 MG Oral Tab 1 tablet (25 mg total) by Per G Tube route 2 (two) times daily. 180 tablet 3    B Complex-C-Folic Acid (RENAL MULTIVITAMIN FORMULA) Oral Tab 1 tablet by Per G Tube route daily. 90 tablet 3    clopidogrel 75 MG Oral Tab Take 1 tablet (75 mg total) by mouth daily. 90 tablet 3    albuterol (PROAIR HFA) 108 (90 Base) MCG/ACT Inhalation Aero Soln Inhale 2 puffs into the lungs every 4 (four) hours as needed for Wheezing. 3 each 3    fluticasone propionate 50 MCG/ACT Nasal Suspension 2 sprays by Nasal route daily. 48 g 3    [] amoxicillin clavulanate 250-125 MG Oral Tab 1 tablet (250 mg total) by  Per G Tube route daily. Taking for swelling to left side of jaw      lansoprazole 30 MG Oral Tablet Delayed Release Dispersible 1 tablet (30 mg total) by Per G Tube route every morning before breakfast. 30 tablet 0    aspirin 81 MG Oral Tab EC Take 1 tablet (81 mg total) by mouth daily. (Patient taking differently: Take 1 tablet (81 mg total) by mouth daily. Per G-tube) 90 tablet 3    cetirizine 10 MG Oral Tab Take 1 tablet (10 mg total) by mouth every other day.      polyethylene glycol, PEG 3350, 17 g Oral Powd Pack 17 g by Per G Tube route daily as needed (Constipation).      Insulin Lispro, 1 Unit Dial, (HUMALOG KWIKPEN) 100 UNIT/ML Subcutaneous Solution Pen-injector Take subcutaneously 4 times daily before tube feedings per sliding scale. Max total dose of 20 units. (Patient not taking: Reported on 2024) 6 mL 1    [] glucagon (GVOKE HYPOPEN 2-PACK) 1 MG/0.2ML Subcutaneous injection Inject 0.2 mL (1 mg total) into the skin once as needed for Low blood glucose. 1 each 0    Insulin Pen Needle 33G X 4 MM Does not apply Misc 1 each 4 (four) times daily. 200 each 1    albuterol (2.5 MG/3ML) 0.083% Inhalation Nebu Soln Take 3 mL (2.5 mg total) by nebulization every 4 (four) hours as needed for Wheezing or Shortness of Breath. (Patient not taking: Reported on 2024) 360 mL 3    HYDROcodone-acetaminophen 5-325 MG Oral Tab 1 tablet by Per G Tube route every 8 (eight) hours as needed. (Patient not taking: Reported on 2024) 90 tablet 0    Incontinence Supply Disposable (COMFORT PROTECT ADULT DIAPER/L) Does not apply Misc 1 Application daily. 30 each 3    Electronic Thermometer ([employer] DIGITAL THERMOMETER TEMPLE) Does not apply Misc Check temperature 1 each 0    insulin glargine (LANTUS SOLOSTAR) 100 UNIT/ML Subcutaneous Solution Pen-injector Inject 8 Units into the skin every morning. (Patient not taking: Reported on 2024) 15 mL 0    Insulin Pen Needle 32G X 4 MM Does not apply Misc Take as directed  200 each 3    Budesonide-Formoterol Fumarate (SYMBICORT) 160-4.5 MCG/ACT Inhalation Aerosol Inhale 2 puffs into the lungs 2 (two) times daily. (Patient not taking: Reported on 12/6/2024) 3 each 3    Starch-Maltodextrin (THICK-IT) Oral Powd Pack Use as directed (Patient not taking: Reported on 12/6/2024) 200 each 3    albuterol (2.5 MG/3ML) 0.083% Inhalation Nebu Soln Take 3 mL (2.5 mg total) by nebulization in the morning and 3 mL (2.5 mg total) before bedtime. (Patient not taking: Reported on 12/3/2024)      lidocaine 4 % External Patch Place 1 patch onto the skin daily. (Patient not taking: Reported on 12/3/2024)      acetaminophen 500 MG Oral Tab Take 1 tablet (500 mg total) by mouth every 6 (six) hours as needed for Pain. (Patient not taking: Reported on 12/6/2024)      Cholecalciferol 125 MCG (5000 UT) Oral Tab Take 1 tablet (5,000 Units total) by mouth 2 (two) times daily. (Patient not taking: Reported on 12/3/2024)

## 2024-12-22 NOTE — PROGRESS NOTES
[facility]  part of [facility]    Progress Note    Ollie Hernández Patient Status:  Inpatient    1945 MRN [id number]   Location [facility] 5SW/SE Attending Dee Joyce,*   Hosp Day # 16 PCP Wili Parmar MD       Subjective:   Ollie Hernández is a(n) 79 year old male who I am seeing for ESRD    No new issues      Objective:   /59 (BP Location: Left arm)   Pulse 68   Temp 97.9 °F (36.6 °C) (Oral)   Resp 18   Ht 5' 4\" (1.626 m)   Wt 171 lb 8.3 oz (77.8 kg)   SpO2 99%   BMI 29.44 kg/m²      Intake/Output Summary (Last 24 hours) at 2024 1235  Last data filed at 2024 0616  Gross per 24 hour   Intake 1379 ml   Output 0 ml   Net 1379 ml     Wt Readings from Last 1 Encounters:   24 171 lb 8.3 oz (77.8 kg)       Exam  Vital signs: Blood pressure 152/59, pulse 68, temperature 97.9 °F (36.6 °C), temperature source Oral, resp. rate 18, height 5' 4\" (1.626 m), weight 171 lb 8.3 oz (77.8 kg), SpO2 99%.    General: No acute distress. Alert and oriented x 3.  HEENT: Moist mucous membranes. EOMI. PERRLA  Neck:  No JVD.   Respiratory: Clear to auscultation bilaterally.    Cardiovascular: S1, S2.  Regular rate and rhythm.   Abdomen: Soft, nontender, nondistended.    Neurologic: No focal neurological deficits.  Musculoskeletal: Full range of motion of all extremities.  No swelling noted.  Access:    Results:     Recent Labs   Lab 24  0532 24  0657 24  0811   RBC 3.47* 3.51* 3.27*   HGB 10.7* 10.8* 10.1*   HCT 33.5* 34.0* 31.1*   MCV 96.5 96.9 95.1   MCH 30.8 30.8 30.9   MCHC 31.9 31.8 32.5   RDW 17.8* 17.5* 17.2*   NEPRELIM 11.14* 10.20* 10.51*   WBC 13.8* 12.8* 13.0*   .0 286.0 301.0         Recent Labs   Lab 24  0532 24  1746 24  0811   * 227* 101*   BUN 37* 25* 43*   CREATSERUM 2.73* 1.87* 3.10*   CA 10.2 9.6 9.7    138 133*   K 4.1 3.4* 3.7   CL 97* 100 99   CO2 31.0 30.0 27.0          XR CHEST  AP PORTABLE  (CPT=71045)    Result Date: 12/20/2024  CONCLUSION:   Again, increased size of right pleural effusion, now large.  Increased associated right mid to lower lung alveolar opacity, may represent combination of atelectasis, alveolar edema, and/or superimposed infection.  Increased interstitial markings bilaterally again noted.  Could be secondary to interstitial edema and/or interstitial infection.    Dictated by (CST): Apple Pierre MD on 12/20/2024 at 7:12 PM     Finalized by (CST): Apple Pierre MD on 12/20/2024 at 7:16 PM           Assessment and Plan:     Impression:    ESRD, HD schedule Monday Wednesday Friday  Hypertension with ESRD on metoprolol  A-fib with RVR, has Watchman.  And on amiodarone, cardiology is following  CAD on aspirin Plavix and beta-blocker  Aspiration pneumonia on antibiotic  Anemia on VANCE  Lower extremity pain, pain meds and physical therapy      Plan:    Dialysis on Monday prior to DC to rehab  Continue VANCE    Thank you very much for allowing me to participate in the care of your patient.  If you have any questions, please do not hesitate to contact me.     Moni Pugh MD

## 2024-12-22 NOTE — PLAN OF CARE
Problem: Patient Centered Care  Goal: Patient preferences are identified and integrated in the patient's plan of care  Description: Interventions:  - What would you like us to know as we care for you? I am from home with my wife  - Provide timely, complete, and accurate information to patient/family  - Incorporate patient and family knowledge, values, beliefs, and cultural backgrounds into the planning and delivery of care  - Encourage patient/family to participate in care and decision-making at the level they choose  - Honor patient and family perspectives and choices  Outcome: Progressing     Problem: Diabetes/Glucose Control  Goal: Glucose maintained within prescribed range  Description: INTERVENTIONS:  - Monitor Blood Glucose as ordered  - Assess for signs and symptoms of hyperglycemia and hypoglycemia  - Administer ordered medications to maintain glucose within target range  - Assess barriers to adequate nutritional intake and initiate nutrition consult as needed  - Instruct patient on self management of diabetes  Outcome: Progressing     Problem: Patient/Family Goals  Goal: Patient/Family Long Term Goal  Description: Patient's Long Term Goal: Discharge from the hospital    Interventions:  - Monitor vital signs  - Monitor appropriate labs  - Monitor blood glucose levels  - Pain management  - Administer medications per order  - Follow MD orders  - Diagnostics per order  - Update / inform patient and family on plan of care  - Discharge planning  - See additional Care Plan goals for specific interventions  Outcome: Progressing  Goal: Patient/Family Short Term Goal  Description: Patient's Short Term Goal: Improve bilateral feet pain    Interventions:   - Monitor vital signs  - Monitor appropriate labs  - Monitor blood glucose levels  - Pain management  - Administer medications per order  - Follow MD orders  - Diagnostics per order  - Update / inform patient and family on plan of care  - See additional Care Plan  goals for specific interventions  Outcome: Progressing     Problem: MUSCULOSKELETAL - ADULT  Goal: Return mobility to safest level of function  Description: INTERVENTIONS:  - Assess patient stability and activity tolerance for standing, transferring and ambulating w/ or w/o assistive devices  - Assist with transfers and ambulation using safe patient handling equipment as needed  - Ensure adequate protection for wounds/incisions during mobilization  - Obtain PT/OT consults as needed  - Advance activity as appropriate  - Communicate ordered activity level and limitations with patient/family  Outcome: Progressing     Problem: PAIN - ADULT  Goal: Verbalizes/displays adequate comfort level or patient's stated pain goal  Description: INTERVENTIONS:  - Encourage pt to monitor pain and request assistance  - Assess pain using appropriate pain scale  - Administer analgesics based on type and severity of pain and evaluate response  - Implement non-pharmacological measures as appropriate and evaluate response  - Consider cultural and social influences on pain and pain management  - Manage/alleviate anxiety  - Utilize distraction and/or relaxation techniques  - Monitor for opioid side effects  - Notify MD/LIP if interventions unsuccessful or patient reports new pain  - Anticipate increased pain with activity and pre-medicate as appropriate  Outcome: Progressing     Problem: RISK FOR INFECTION - ADULT  Goal: Absence of fever/infection during anticipated neutropenic period  Description: INTERVENTIONS  - Monitor WBC  - Administer growth factors as ordered  - Implement neutropenic guidelines  Outcome: Progressing     Problem: SAFETY ADULT - FALL  Goal: Free from fall injury  Description: INTERVENTIONS:  - Assess pt frequently for physical needs  - Identify cognitive and physical deficits and behaviors that affect risk of falls.  - [place] fall precautions as indicated by assessment.  - Educate pt/family on patient safety including  physical limitations  - Instruct pt to call for assistance with activity based on assessment  - Modify environment to reduce risk of injury  - Provide assistive devices as appropriate  - Consider OT/PT consult to assist with strengthening/mobility  - Encourage toileting schedule  Outcome: Progressing     Problem: DISCHARGE PLANNING  Goal: Discharge to home or other facility with appropriate resources  Description: INTERVENTIONS:  - Identify barriers to discharge w/pt and caregiver  - Include patient/family/discharge partner in discharge planning  - Arrange for needed discharge resources and transportation as appropriate  - Identify discharge learning needs (meds, wound care, etc)  - Arrange for interpreters to assist at discharge as needed  - Consider post-discharge preferences of patient/family/discharge partner  - Complete POLST form as appropriate  - Assess patient's ability to be responsible for managing their own health  - Refer to Case Management Department for coordinating discharge planning if the patient needs post-hospital services based on physician/LIP order or complex needs related to functional status, cognitive ability or social support system  Outcome: Progressing     Problem: METABOLIC/FLUID AND ELECTROLYTES - ADULT  Goal: Glucose maintained within prescribed range  Description: INTERVENTIONS:  - Monitor Blood Glucose as ordered  - Assess for signs and symptoms of hyperglycemia and hypoglycemia  - Administer ordered medications to maintain glucose within target range  - Assess barriers to adequate nutritional intake and initiate nutrition consult as needed  - Instruct patient on self management of diabetes  Outcome: Progressing  Goal: Electrolytes maintained within normal limits  Description: INTERVENTIONS:  - Monitor labs and rhythm and assess patient for signs and symptoms of electrolyte imbalances  - Administer electrolyte replacement as ordered  - Monitor response to electrolyte replacements,  including rhythm and repeat lab results as appropriate  - Fluid restriction as ordered  - Instruct patient on fluid and nutrition restrictions as appropriate  Outcome: Progressing  Goal: Hemodynamic stability and optimal renal function maintained  Description: INTERVENTIONS:  - Monitor labs and assess for signs and symptoms of volume excess or deficit  - Monitor intake, output and patient weight  - Monitor urine specific gravity, serum osmolarity and serum sodium as indicated or ordered  - Monitor response to interventions for patient's volume status, including labs, urine output, blood pressure (other measures as available)  - Encourage oral intake as appropriate  - Instruct patient on fluid and nutrition restrictions as appropriate  Outcome: Progressing      Monitoring vital signs- see flowsheets. Monitoring blood glucose levels. Pain medication provided as scheduled. No acute changes noted at this moment. Safety and fall precautions maintained- bed alarm on, bed locked in lowest position, call light within reach. Frequent rounding by nursing staff.

## 2024-12-22 NOTE — PLAN OF CARE
Problem: Patient Centered Care  Goal: Patient preferences are identified and integrated in the patient's plan of care  Description: Interventions:  - What would you like us to know as we care for you? I am from home with my wife  - Provide timely, complete, and accurate information to patient/family  - Incorporate patient and family knowledge, values, beliefs, and cultural backgrounds into the planning and delivery of care  - Encourage patient/family to participate in care and decision-making at the level they choose  - Honor patient and family perspectives and choices  Outcome: Progressing     Problem: Diabetes/Glucose Control  Goal: Glucose maintained within prescribed range  Description: INTERVENTIONS:  - Monitor Blood Glucose as ordered  - Assess for signs and symptoms of hyperglycemia and hypoglycemia  - Administer ordered medications to maintain glucose within target range  - Assess barriers to adequate nutritional intake and initiate nutrition consult as needed  - Instruct patient on self management of diabetes  Outcome: Progressing     Problem: Patient/Family Goals  Goal: Patient/Family Long Term Goal  Description: Patient's Long Term Goal: Discharge from the hospital    Interventions:  - Monitor vital signs  - Monitor appropriate labs  - Monitor blood glucose levels  - Pain management  - Administer medications per order  - Follow MD orders  - Diagnostics per order  - Update / inform patient and family on plan of care  - Discharge planning  - See additional Care Plan goals for specific interventions  Outcome: Progressing  Goal: Patient/Family Short Term Goal  Description: Patient's Short Term Goal: Improve bilateral feet pain    Interventions:   - Monitor vital signs  - Monitor appropriate labs  - Monitor blood glucose levels  - Pain management  - Administer medications per order  - Follow MD orders  - Diagnostics per order  - Update / inform patient and family on plan of care  - See additional Care Plan  goals for specific interventions  Outcome: Progressing     Problem: MUSCULOSKELETAL - ADULT  Goal: Return mobility to safest level of function  Description: INTERVENTIONS:  - Assess patient stability and activity tolerance for standing, transferring and ambulating w/ or w/o assistive devices  - Assist with transfers and ambulation using safe patient handling equipment as needed  - Ensure adequate protection for wounds/incisions during mobilization  - Obtain PT/OT consults as needed  - Advance activity as appropriate  - Communicate ordered activity level and limitations with patient/family  Outcome: Progressing     Problem: PAIN - ADULT  Goal: Verbalizes/displays adequate comfort level or patient's stated pain goal  Description: INTERVENTIONS:  - Encourage pt to monitor pain and request assistance  - Assess pain using appropriate pain scale  - Administer analgesics based on type and severity of pain and evaluate response  - Implement non-pharmacological measures as appropriate and evaluate response  - Consider cultural and social influences on pain and pain management  - Manage/alleviate anxiety  - Utilize distraction and/or relaxation techniques  - Monitor for opioid side effects  - Notify MD/LIP if interventions unsuccessful or patient reports new pain  - Anticipate increased pain with activity and pre-medicate as appropriate  Outcome: Progressing     Problem: RISK FOR INFECTION - ADULT  Goal: Absence of fever/infection during anticipated neutropenic period  Description: INTERVENTIONS  - Monitor WBC  - Administer growth factors as ordered  - Implement neutropenic guidelines  Outcome: Progressing     Problem: SAFETY ADULT - FALL  Goal: Free from fall injury  Description: INTERVENTIONS:  - Assess pt frequently for physical needs  - Identify cognitive and physical deficits and behaviors that affect risk of falls.  - [place] fall precautions as indicated by assessment.  - Educate pt/family on patient safety including  physical limitations  - Instruct pt to call for assistance with activity based on assessment  - Modify environment to reduce risk of injury  - Provide assistive devices as appropriate  - Consider OT/PT consult to assist with strengthening/mobility  - Encourage toileting schedule  Outcome: Progressing     Problem: DISCHARGE PLANNING  Goal: Discharge to home or other facility with appropriate resources  Description: INTERVENTIONS:  - Identify barriers to discharge w/pt and caregiver  - Include patient/family/discharge partner in discharge planning  - Arrange for needed discharge resources and transportation as appropriate  - Identify discharge learning needs (meds, wound care, etc)  - Arrange for interpreters to assist at discharge as needed  - Consider post-discharge preferences of patient/family/discharge partner  - Complete POLST form as appropriate  - Assess patient's ability to be responsible for managing their own health  - Refer to Case Management Department for coordinating discharge planning if the patient needs post-hospital services based on physician/LIP order or complex needs related to functional status, cognitive ability or social support system  Outcome: Progressing     Problem: METABOLIC/FLUID AND ELECTROLYTES - ADULT  Goal: Glucose maintained within prescribed range  Description: INTERVENTIONS:  - Monitor Blood Glucose as ordered  - Assess for signs and symptoms of hyperglycemia and hypoglycemia  - Administer ordered medications to maintain glucose within target range  - Assess barriers to adequate nutritional intake and initiate nutrition consult as needed  - Instruct patient on self management of diabetes  Outcome: Progressing  Goal: Electrolytes maintained within normal limits  Description: INTERVENTIONS:  - Monitor labs and rhythm and assess patient for signs and symptoms of electrolyte imbalances  - Administer electrolyte replacement as ordered  - Monitor response to electrolyte replacements,  including rhythm and repeat lab results as appropriate  - Fluid restriction as ordered  - Instruct patient on fluid and nutrition restrictions as appropriate  Outcome: Progressing  Goal: Hemodynamic stability and optimal renal function maintained  Description: INTERVENTIONS:  - Monitor labs and assess for signs and symptoms of volume excess or deficit  - Monitor intake, output and patient weight  - Monitor urine specific gravity, serum osmolarity and serum sodium as indicated or ordered  - Monitor response to interventions for patient's volume status, including labs, urine output, blood pressure (other measures as available)  - Encourage oral intake as appropriate  - Instruct patient on fluid and nutrition restrictions as appropriate  Outcome: Progressing

## 2024-12-22 NOTE — PLAN OF CARE
Problem: Patient Centered Care  Goal: Patient preferences are identified and integrated in the patient's plan of care  Description: Interventions:  - What would you like us to know as we care for you? I am from home with my wife  - Provide timely, complete, and accurate information to patient/family  - Incorporate patient and family knowledge, values, beliefs, and cultural backgrounds into the planning and delivery of care  - Encourage patient/family to participate in care and decision-making at the level they choose  - Honor patient and family perspectives and choices  Outcome: Progressing     Problem: Diabetes/Glucose Control  Goal: Glucose maintained within prescribed range  Description: INTERVENTIONS:  - Monitor Blood Glucose as ordered  - Assess for signs and symptoms of hyperglycemia and hypoglycemia  - Administer ordered medications to maintain glucose within target range  - Assess barriers to adequate nutritional intake and initiate nutrition consult as needed  - Instruct patient on self management of diabetes  Outcome: Progressing     Problem: Patient/Family Goals  Goal: Patient/Family Long Term Goal  Description: Patient's Long Term Goal: Discharge from the hospital    Interventions:  - Monitor vital signs  - Monitor appropriate labs  - Monitor blood glucose levels  - Pain management  - Administer medications per order  - Follow MD orders  - Diagnostics per order  - Update / inform patient and family on plan of care  - Discharge planning  - See additional Care Plan goals for specific interventions  Outcome: Progressing  Goal: Patient/Family Short Term Goal  Description: Patient's Short Term Goal: Improve bilateral feet pain    Interventions:   - Monitor vital signs  - Monitor appropriate labs  - Monitor blood glucose levels  - Pain management  - Administer medications per order  - Follow MD orders  - Diagnostics per order  - Update / inform patient and family on plan of care  - See additional Care Plan  goals for specific interventions  Outcome: Progressing     Problem: MUSCULOSKELETAL - ADULT  Goal: Return mobility to safest level of function  Description: INTERVENTIONS:  - Assess patient stability and activity tolerance for standing, transferring and ambulating w/ or w/o assistive devices  - Assist with transfers and ambulation using safe patient handling equipment as needed  - Ensure adequate protection for wounds/incisions during mobilization  - Obtain PT/OT consults as needed  - Advance activity as appropriate  - Communicate ordered activity level and limitations with patient/family  Outcome: Progressing     Problem: PAIN - ADULT  Goal: Verbalizes/displays adequate comfort level or patient's stated pain goal  Description: INTERVENTIONS:  - Encourage pt to monitor pain and request assistance  - Assess pain using appropriate pain scale  - Administer analgesics based on type and severity of pain and evaluate response  - Implement non-pharmacological measures as appropriate and evaluate response  - Consider cultural and social influences on pain and pain management  - Manage/alleviate anxiety  - Utilize distraction and/or relaxation techniques  - Monitor for opioid side effects  - Notify MD/LIP if interventions unsuccessful or patient reports new pain  - Anticipate increased pain with activity and pre-medicate as appropriate  Outcome: Progressing     Problem: RISK FOR INFECTION - ADULT  Goal: Absence of fever/infection during anticipated neutropenic period  Description: INTERVENTIONS  - Monitor WBC  - Administer growth factors as ordered  - Implement neutropenic guidelines  Outcome: Progressing     Problem: SAFETY ADULT - FALL  Goal: Free from fall injury  Description: INTERVENTIONS:  - Assess pt frequently for physical needs  - Identify cognitive and physical deficits and behaviors that affect risk of falls.  - [place] fall precautions as indicated by assessment.  - Educate pt/family on patient safety including  physical limitations  - Instruct pt to call for assistance with activity based on assessment  - Modify environment to reduce risk of injury  - Provide assistive devices as appropriate  - Consider OT/PT consult to assist with strengthening/mobility  - Encourage toileting schedule  Outcome: Progressing     Problem: DISCHARGE PLANNING  Goal: Discharge to home or other facility with appropriate resources  Description: INTERVENTIONS:  - Identify barriers to discharge w/pt and caregiver  - Include patient/family/discharge partner in discharge planning  - Arrange for needed discharge resources and transportation as appropriate  - Identify discharge learning needs (meds, wound care, etc)  - Arrange for interpreters to assist at discharge as needed  - Consider post-discharge preferences of patient/family/discharge partner  - Complete POLST form as appropriate  - Assess patient's ability to be responsible for managing their own health  - Refer to Case Management Department for coordinating discharge planning if the patient needs post-hospital services based on physician/LIP order or complex needs related to functional status, cognitive ability or social support system  Outcome: Progressing     Problem: METABOLIC/FLUID AND ELECTROLYTES - ADULT  Goal: Glucose maintained within prescribed range  Description: INTERVENTIONS:  - Monitor Blood Glucose as ordered  - Assess for signs and symptoms of hyperglycemia and hypoglycemia  - Administer ordered medications to maintain glucose within target range  - Assess barriers to adequate nutritional intake and initiate nutrition consult as needed  - Instruct patient on self management of diabetes  Outcome: Progressing  Goal: Electrolytes maintained within normal limits  Description: INTERVENTIONS:  - Monitor labs and rhythm and assess patient for signs and symptoms of electrolyte imbalances  - Administer electrolyte replacement as ordered  - Monitor response to electrolyte replacements,  including rhythm and repeat lab results as appropriate  - Fluid restriction as ordered  - Instruct patient on fluid and nutrition restrictions as appropriate  Outcome: Progressing  Goal: Hemodynamic stability and optimal renal function maintained  Description: INTERVENTIONS:  - Monitor labs and assess for signs and symptoms of volume excess or deficit  - Monitor intake, output and patient weight  - Monitor urine specific gravity, serum osmolarity and serum sodium as indicated or ordered  - Monitor response to interventions for patient's volume status, including labs, urine output, blood pressure (other measures as available)  - Encourage oral intake as appropriate  - Instruct patient on fluid and nutrition restrictions as appropriate  Outcome: Progressing

## 2024-12-23 VITALS
SYSTOLIC BLOOD PRESSURE: 138 MMHG | HEART RATE: 69 BPM | WEIGHT: 171.5 LBS | BODY MASS INDEX: 29.28 KG/M2 | DIASTOLIC BLOOD PRESSURE: 59 MMHG | HEIGHT: 64 IN | OXYGEN SATURATION: 94 % | RESPIRATION RATE: 18 BRPM | TEMPERATURE: 98 F

## 2024-12-23 LAB
GLUCOSE BLDC GLUCOMTR-MCNC: 150 MG/DL (ref 70–99)
GLUCOSE BLDC GLUCOMTR-MCNC: 192 MG/DL (ref 70–99)
GLUCOSE BLDC GLUCOMTR-MCNC: 64 MG/DL (ref 70–99)

## 2024-12-23 PROCEDURE — 99239 HOSP IP/OBS DSCHRG MGMT >30: CPT | Performed by: HOSPITALIST

## 2024-12-23 PROCEDURE — 90935 HEMODIALYSIS ONE EVALUATION: CPT | Performed by: INTERNAL MEDICINE

## 2024-12-23 RX ORDER — NORTRIPTYLINE HYDROCHLORIDE 10 MG/1
10 CAPSULE ORAL NIGHTLY
Qty: 30 CAPSULE | Refills: 0 | Status: SHIPPED | OUTPATIENT
Start: 2024-12-23

## 2024-12-23 RX ORDER — TRAMADOL HYDROCHLORIDE 50 MG/1
50 TABLET ORAL EVERY 12 HOURS PRN
Qty: 30 TABLET | Refills: 0 | Status: SHIPPED | OUTPATIENT
Start: 2024-12-23

## 2024-12-23 RX ORDER — DILTIAZEM HYDROCHLORIDE 30 MG/1
60 TABLET, FILM COATED ORAL EVERY 6 HOURS SCHEDULED
Status: DISCONTINUED | OUTPATIENT
Start: 2024-12-23 | End: 2024-12-23

## 2024-12-23 RX ORDER — SIMETHICONE 125 MG
125 TABLET,CHEWABLE ORAL
Qty: 120 TABLET | Refills: 0 | Status: SHIPPED | OUTPATIENT
Start: 2024-12-23

## 2024-12-23 RX ORDER — DILTIAZEM HCL 60 MG
60 TABLET ORAL EVERY 6 HOURS SCHEDULED
Qty: 120 TABLET | Refills: 0 | Status: SHIPPED | OUTPATIENT
Start: 2024-12-23

## 2024-12-23 RX ORDER — ASPIRIN 81 MG/1
81 TABLET ORAL DAILY
Qty: 30 TABLET | Refills: 0 | Status: SHIPPED | OUTPATIENT
Start: 2024-12-23

## 2024-12-23 RX ORDER — METOPROLOL TARTRATE 75 MG/1
150 TABLET ORAL
Qty: 120 TABLET | Refills: 0 | Status: SHIPPED | OUTPATIENT
Start: 2024-12-23

## 2024-12-23 RX ORDER — AMIODARONE HYDROCHLORIDE 200 MG/1
200 TABLET ORAL 2 TIMES DAILY WITH MEALS
Qty: 14 TABLET | Refills: 0 | Status: SHIPPED | OUTPATIENT
Start: 2024-12-23

## 2024-12-23 RX ORDER — SODIUM BICARBONATE 325 MG/1
325 TABLET ORAL AS NEEDED
Qty: 30 TABLET | Refills: 0 | Status: SHIPPED | OUTPATIENT
Start: 2024-12-23

## 2024-12-23 NOTE — PROGRESS NOTES
[facility]  part of [facility]    Progress Note    Ollie Hernández Patient Status:  Inpatient    1945 MRN [id number]   Location [facility] 5SW/SE Attending Dee Joyce,*   Hosp Day # 17 PCP Wili Parmar MD       Subjective:   Ollie Hernández is a(n) 79 year old male who I am seeing for ESRD    No new issues  Seen on HD tolerating well    Objective:   /51 (BP Location: Left arm)   Pulse 70   Temp 97.7 °F (36.5 °C) (Oral)   Resp 18   Ht 5' 4\" (1.626 m)   Wt 171 lb 8.3 oz (77.8 kg)   SpO2 96%   BMI 29.44 kg/m²      Intake/Output Summary (Last 24 hours) at 2024 1025  Last data filed at 2024 0552  Gross per 24 hour   Intake 857 ml   Output 10 ml   Net 847 ml     Wt Readings from Last 1 Encounters:   24 171 lb 8.3 oz (77.8 kg)       Exam  Vital signs: Blood pressure 141/51, pulse 70, temperature 97.7 °F (36.5 °C), temperature source Oral, resp. rate 18, height 5' 4\" (1.626 m), weight 171 lb 8.3 oz (77.8 kg), SpO2 96%.    General: No acute distress. Alert and oriented x 3.  HEENT: Moist mucous membranes. EOMI. PERRLA  Neck:  No JVD.   Respiratory: Clear to auscultation bilaterally.    Cardiovascular: S1, S2.  Regular rate and rhythm.   Abdomen: Soft, nontender, nondistended.    Neurologic: No focal neurological deficits.  Musculoskeletal: Full range of motion of all extremities.  No swelling noted.  Access: AVF cannulated    Results:     Recent Labs   Lab 24  0532 24  0657 24  0811   RBC 3.47* 3.51* 3.27*   HGB 10.7* 10.8* 10.1*   HCT 33.5* 34.0* 31.1*   MCV 96.5 96.9 95.1   MCH 30.8 30.8 30.9   MCHC 31.9 31.8 32.5   RDW 17.8* 17.5* 17.2*   NEPRELIM 11.14* 10.20* 10.51*   WBC 13.8* 12.8* 13.0*   .0 286.0 301.0         Recent Labs   Lab 24  0532 24  1746 24  0811   * 227* 101*   BUN 37* 25* 43*   CREATSERUM 2.73* 1.87* 3.10*   CA 10.2 9.6 9.7    138 133*   K 4.1 3.4* 3.7   CL 97* 100 99    CO2 31.0 30.0 27.0          No results found.    Assessment and Plan:     Impression:    ESRD, HD schedule Monday Wednesday Friday  Hypertension with ESRD on metoprolol  A-fib with RVR, has Watchman.  And on amiodarone, cardiology is following  CAD on aspirin Plavix and beta-blocker  Aspiration pneumonia on antibiotic  Anemia on VANCE  Lower extremity pain, pain meds and physical therapy      Plan:    Dialysis today  Continue VANCE    Thank you very much for allowing me to participate in the care of your patient.  If you have any questions, please do not hesitate to contact me.     Moni Pugh MD

## 2024-12-23 NOTE — CM/SW NOTE
12/23/24 1000   Discharge disposition   Expected discharge disposition subacute   Post Acute Care Provider [place]   Discharge transportation [facility]     MD DC order entered.   Bed available at [facility] today. [facility] Is approved for onsite.    RN to call report to CHUY/Michelle Tamayo at [phone]    Plan: DC to [facility] via BLS at 3pm. PCS Done. Pt max assist for transfers, high risk for falls, on 1LPM O2 via [place].    / to remain available for support and/or discharge planning.   Lu Lubin RN, BSN  Nurse   [phone]

## 2024-12-23 NOTE — PLAN OF CARE
Problem: Patient Centered Care  Goal: Patient preferences are identified and integrated in the patient's plan of care  Description: Interventions:  - What would you like us to know as we care for you? I am from home with my wife  - Provide timely, complete, and accurate information to patient/family  - Incorporate patient and family knowledge, values, beliefs, and cultural backgrounds into the planning and delivery of care  - Encourage patient/family to participate in care and decision-making at the level they choose  - Honor patient and family perspectives and choices  Outcome: Progressing     Problem: Diabetes/Glucose Control  Goal: Glucose maintained within prescribed range  Description: INTERVENTIONS:  - Monitor Blood Glucose as ordered  - Assess for signs and symptoms of hyperglycemia and hypoglycemia  - Administer ordered medications to maintain glucose within target range  - Assess barriers to adequate nutritional intake and initiate nutrition consult as needed  - Instruct patient on self management of diabetes  Outcome: Progressing     Problem: Patient/Family Goals  Goal: Patient/Family Long Term Goal  Description: Patient's Long Term Goal: Discharge from the hospital    Interventions:  - Monitor vital signs  - Monitor appropriate labs  - Monitor blood glucose levels  - Pain management  - Administer medications per order  - Follow MD orders  - Diagnostics per order  - Update / inform patient and family on plan of care  - Discharge planning  - See additional Care Plan goals for specific interventions  Outcome: Progressing  Goal: Patient/Family Short Term Goal  Description: Patient's Short Term Goal: Improve bilateral feet pain    Interventions:   - Monitor vital signs  - Monitor appropriate labs  - Monitor blood glucose levels  - Pain management  - Administer medications per order  - Follow MD orders  - Diagnostics per order  - Update / inform patient and family on plan of care  - See additional Care Plan  goals for specific interventions  Outcome: Progressing     Problem: MUSCULOSKELETAL - ADULT  Goal: Return mobility to safest level of function  Description: INTERVENTIONS:  - Assess patient stability and activity tolerance for standing, transferring and ambulating w/ or w/o assistive devices  - Assist with transfers and ambulation using safe patient handling equipment as needed  - Ensure adequate protection for wounds/incisions during mobilization  - Obtain PT/OT consults as needed  - Advance activity as appropriate  - Communicate ordered activity level and limitations with patient/family  Outcome: Progressing     Problem: PAIN - ADULT  Goal: Verbalizes/displays adequate comfort level or patient's stated pain goal  Description: INTERVENTIONS:  - Encourage pt to monitor pain and request assistance  - Assess pain using appropriate pain scale  - Administer analgesics based on type and severity of pain and evaluate response  - Implement non-pharmacological measures as appropriate and evaluate response  - Consider cultural and social influences on pain and pain management  - Manage/alleviate anxiety  - Utilize distraction and/or relaxation techniques  - Monitor for opioid side effects  - Notify MD/LIP if interventions unsuccessful or patient reports new pain  - Anticipate increased pain with activity and pre-medicate as appropriate  Outcome: Progressing     Problem: RISK FOR INFECTION - ADULT  Goal: Absence of fever/infection during anticipated neutropenic period  Description: INTERVENTIONS  - Monitor WBC  - Administer growth factors as ordered  - Implement neutropenic guidelines  Outcome: Progressing     Problem: SAFETY ADULT - FALL  Goal: Free from fall injury  Description: INTERVENTIONS:  - Assess pt frequently for physical needs  - Identify cognitive and physical deficits and behaviors that affect risk of falls.  - [place] fall precautions as indicated by assessment.  - Educate pt/family on patient safety including  physical limitations  - Instruct pt to call for assistance with activity based on assessment  - Modify environment to reduce risk of injury  - Provide assistive devices as appropriate  - Consider OT/PT consult to assist with strengthening/mobility  - Encourage toileting schedule  Outcome: Progressing     Problem: DISCHARGE PLANNING  Goal: Discharge to home or other facility with appropriate resources  Description: INTERVENTIONS:  - Identify barriers to discharge w/pt and caregiver  - Include patient/family/discharge partner in discharge planning  - Arrange for needed discharge resources and transportation as appropriate  - Identify discharge learning needs (meds, wound care, etc)  - Arrange for interpreters to assist at discharge as needed  - Consider post-discharge preferences of patient/family/discharge partner  - Complete POLST form as appropriate  - Assess patient's ability to be responsible for managing their own health  - Refer to Case Management Department for coordinating discharge planning if the patient needs post-hospital services based on physician/LIP order or complex needs related to functional status, cognitive ability or social support system  Outcome: Progressing     Problem: METABOLIC/FLUID AND ELECTROLYTES - ADULT  Goal: Glucose maintained within prescribed range  Description: INTERVENTIONS:  - Monitor Blood Glucose as ordered  - Assess for signs and symptoms of hyperglycemia and hypoglycemia  - Administer ordered medications to maintain glucose within target range  - Assess barriers to adequate nutritional intake and initiate nutrition consult as needed  - Instruct patient on self management of diabetes  Outcome: Progressing  Goal: Electrolytes maintained within normal limits  Description: INTERVENTIONS:  - Monitor labs and rhythm and assess patient for signs and symptoms of electrolyte imbalances  - Administer electrolyte replacement as ordered  - Monitor response to electrolyte replacements,  including rhythm and repeat lab results as appropriate  - Fluid restriction as ordered  - Instruct patient on fluid and nutrition restrictions as appropriate  Outcome: Progressing  Goal: Hemodynamic stability and optimal renal function maintained  Description: INTERVENTIONS:  - Monitor labs and assess for signs and symptoms of volume excess or deficit  - Monitor intake, output and patient weight  - Monitor urine specific gravity, serum osmolarity and serum sodium as indicated or ordered  - Monitor response to interventions for patient's volume status, including labs, urine output, blood pressure (other measures as available)  - Encourage oral intake as appropriate  - Instruct patient on fluid and nutrition restrictions as appropriate  Outcome: Progressing     Monitoring vital signs- see flowsheets. Monitoring blood glucose levels. Pain medication provided as scheduled. No acute changes noted at this moment. Safety and fall precautions maintained- bed alarm on, bed locked in lowest position, call light within reach. Frequent rounding by nursing staff.

## 2024-12-23 NOTE — PLAN OF CARE
Problem: Patient Centered Care  Goal: Patient preferences are identified and integrated in the patient's plan of care  Description: Interventions:  - What would you like us to know as we care for you? I am from home with my wife  - Provide timely, complete, and accurate information to patient/family  - Incorporate patient and family knowledge, values, beliefs, and cultural backgrounds into the planning and delivery of care  - Encourage patient/family to participate in care and decision-making at the level they choose  - Honor patient and family perspectives and choices  Outcome: Completed     Problem: Diabetes/Glucose Control  Goal: Glucose maintained within prescribed range  Description: INTERVENTIONS:  - Monitor Blood Glucose as ordered  - Assess for signs and symptoms of hyperglycemia and hypoglycemia  - Administer ordered medications to maintain glucose within target range  - Assess barriers to adequate nutritional intake and initiate nutrition consult as needed  - Instruct patient on self management of diabetes  Outcome: Completed     Problem: Patient/Family Goals  Goal: Patient/Family Long Term Goal  Description: Patient's Long Term Goal: Discharge from the hospital    Interventions:  - Monitor vital signs  - Monitor appropriate labs  - Monitor blood glucose levels  - Pain management  - Administer medications per order  - Follow MD orders  - Diagnostics per order  - Update / inform patient and family on plan of care  - Discharge planning  - See additional Care Plan goals for specific interventions  Outcome: Completed  Goal: Patient/Family Short Term Goal  Description: Patient's Short Term Goal: Improve bilateral feet pain    Interventions:   - Monitor vital signs  - Monitor appropriate labs  - Monitor blood glucose levels  - Pain management  - Administer medications per order  - Follow MD orders  - Diagnostics per order  - Update / inform patient and family on plan of care  - See additional Care Plan goals  for specific interventions  Outcome: Completed     Problem: MUSCULOSKELETAL - ADULT  Goal: Return mobility to safest level of function  Description: INTERVENTIONS:  - Assess patient stability and activity tolerance for standing, transferring and ambulating w/ or w/o assistive devices  - Assist with transfers and ambulation using safe patient handling equipment as needed  - Ensure adequate protection for wounds/incisions during mobilization  - Obtain PT/OT consults as needed  - Advance activity as appropriate  - Communicate ordered activity level and limitations with patient/family  Outcome: Completed     Problem: PAIN - ADULT  Goal: Verbalizes/displays adequate comfort level or patient's stated pain goal  Description: INTERVENTIONS:  - Encourage pt to monitor pain and request assistance  - Assess pain using appropriate pain scale  - Administer analgesics based on type and severity of pain and evaluate response  - Implement non-pharmacological measures as appropriate and evaluate response  - Consider cultural and social influences on pain and pain management  - Manage/alleviate anxiety  - Utilize distraction and/or relaxation techniques  - Monitor for opioid side effects  - Notify MD/LIP if interventions unsuccessful or patient reports new pain  - Anticipate increased pain with activity and pre-medicate as appropriate  Outcome: Completed     Problem: RISK FOR INFECTION - ADULT  Goal: Absence of fever/infection during anticipated neutropenic period  Description: INTERVENTIONS  - Monitor WBC  - Administer growth factors as ordered  - Implement neutropenic guidelines  Outcome: Completed     Problem: SAFETY ADULT - FALL  Goal: Free from fall injury  Description: INTERVENTIONS:  - Assess pt frequently for physical needs  - Identify cognitive and physical deficits and behaviors that affect risk of falls.  - [place] fall precautions as indicated by assessment.  - Educate pt/family on patient safety including physical  limitations  - Instruct pt to call for assistance with activity based on assessment  - Modify environment to reduce risk of injury  - Provide assistive devices as appropriate  - Consider OT/PT consult to assist with strengthening/mobility  - Encourage toileting schedule  Outcome: Completed     Problem: DISCHARGE PLANNING  Goal: Discharge to home or other facility with appropriate resources  Description: INTERVENTIONS:  - Identify barriers to discharge w/pt and caregiver  - Include patient/family/discharge partner in discharge planning  - Arrange for needed discharge resources and transportation as appropriate  - Identify discharge learning needs (meds, wound care, etc)  - Arrange for interpreters to assist at discharge as needed  - Consider post-discharge preferences of patient/family/discharge partner  - Complete POLST form as appropriate  - Assess patient's ability to be responsible for managing their own health  - Refer to Case Management Department for coordinating discharge planning if the patient needs post-hospital services based on physician/LIP order or complex needs related to functional status, cognitive ability or social support system  Outcome: Completed     Problem: METABOLIC/FLUID AND ELECTROLYTES - ADULT  Goal: Glucose maintained within prescribed range  Description: INTERVENTIONS:  - Monitor Blood Glucose as ordered  - Assess for signs and symptoms of hyperglycemia and hypoglycemia  - Administer ordered medications to maintain glucose within target range  - Assess barriers to adequate nutritional intake and initiate nutrition consult as needed  - Instruct patient on self management of diabetes  Outcome: Completed  Goal: Electrolytes maintained within normal limits  Description: INTERVENTIONS:  - Monitor labs and rhythm and assess patient for signs and symptoms of electrolyte imbalances  - Administer electrolyte replacement as ordered  - Monitor response to electrolyte replacements, including rhythm  and repeat lab results as appropriate  - Fluid restriction as ordered  - Instruct patient on fluid and nutrition restrictions as appropriate  Outcome: Completed  Goal: Hemodynamic stability and optimal renal function maintained  Description: INTERVENTIONS:  - Monitor labs and assess for signs and symptoms of volume excess or deficit  - Monitor intake, output and patient weight  - Monitor urine specific gravity, serum osmolarity and serum sodium as indicated or ordered  - Monitor response to interventions for patient's volume status, including labs, urine output, blood pressure (other measures as available)  - Encourage oral intake as appropriate  - Instruct patient on fluid and nutrition restrictions as appropriate  Outcome: Completed

## 2024-12-23 NOTE — PROGRESS NOTES
Cardiology Progress Note  [facility]    Ollie Hernández Patient Status:  Inpatient    1945 MRN [id number]   Location [facility] 5SW/SE Attending Lizbeth Rey MD   Hosp Day # 17 PCP Wili Parmar MD         Reason for Consultation:  Afib    Subjective:  Doing well. No CV complaints. Getting dialysis this morning. He has paroxysmal episodes of afib with RVR, which self-terminate. He went back into afib during dialysis yesterday    Feels injection helped w/ more foot movement  in / out of AF  Pt can't tell     In NSR  today  feel SOB when in AF     Assessment/Plan:  Afib s/p Watchman 24 (24mm Watchman FLX ). AF also likely precipitated by volume overload and possible infection.  CAD s/p PCI LCX with [place] ABIMBOLA x 1 (24)   HFpEF  ESRD on iHD  T2DM  KRAIG  HTN // HL   Polyneuropathy,    - Restarted DAPT .  - metoprolol to 150mg BID. Diltiazem 60 qid for RVR when in AF   - IV metoprolol 5mg prn if needed during dialysis if rates > 130 bpm.    - continue amio 200mg daily. If patient continues to have episodes of RVR, can increase to amio 200mg TID. Overall, his episodes of RVR are self-limited and he is asymptomatic. Reviewed w/family at bedside potential SE of Amiodarone and need for F/ U  to screen for SE   add diltiazem rebolus Amio w/ RVR and recurrence of A Flutter    * Discharge dose of Amiodarone 200 bid for 7 days then 200 every day     - TTE reviewed. RVSP 62 mmHg.   - RVSP 62 mmHg and right pleural effusion- recommend further dialysis and challenge dry weight.  - Tele   - Statin   - Will continue to follow.Discharge planning from cardiac standpoint     History:  Past Medical History:    Anemia    Asthma (HCC)    Back problem    BPH (benign prostatic hyperplasia)    Calculus of kidney    Cataract    Coronary atherosclerosis    Diabetes (HCC)    ESRD (end stage renal disease) on dialysis (HCC)    Essential hypertension    High blood pressure    High cholesterol     History of blood transfusion    Hyperlipidemia    Neuropathy    hands and feet    KRAIG on CPAP    Renal disorder    Sleep apnea    Visual impairment    glasses    Vocal cord paralysis, unilateral partial     Past Surgical History:   Procedure Laterality Date    Appendectomy      1981    Back surgery      Neck/back - 1998    Capsule endoscopy - internal referral      Cataract extraction Right 01/08/2022    PHACOEMULSIFICATION WITH POSTERIOR CHAMBER INTRAOCULAR LENS, RIGHT EYE    Cataract extraction Left 12/21/2021    PHACOEMULSIFICATION WITH POSTERIOR CHAMBER INTRAOCULAR LENS, LEFT EYE    Cath bare metal stent (bms)      Cath drug eluting stent  05/21/2024    Successful IVUS guided PCI of the circumflex with a ABIMBOLA    Colonoscopy      Colonoscopy N/A 01/25/2021    Procedure: COLONOSCOPY;  Surgeon: Michael Gautam MD;  Location: [facility] ENDO    Colonoscopy N/A 06/03/2024    Procedure: COLONOSCOPY;  Surgeon: Gabriel Saldana MD;  Location: [facility] ENDOSCOPY    Colonoscopy N/A 06/15/2024    Procedure: COLONOSCOPY;  Surgeon: Carlyn Storey MD;  Location: [facility] ENDOSCOPY    Colonoscopy N/A 07/31/2024    Procedure: COLONOSCOPY;  Surgeon: Gabriel Saldana MD;  Location: [facility] ENDOSCOPY    Dialysis procedure  02/17/2023    right internal jugular tunneled dialysis catheter placement.    Hand/finger surgery unlisted      Accidental trauma    Spine surgery procedure unlisted      Total hip arthroplasty Left 10/04/2024    Left anterior total hip arthroplasty    Upper gi endoscopy,diagnosis       Family History   Problem Relation Age of Onset    Cancer Father         Kidney    Breast Cancer Mother     Diabetes Mother     Diabetes Maternal Grandmother     Diabetes Maternal Grandfather     Heart Disorder Other         Uncle      reports that he quit smoking about 44 years ago. His smoking use included cigarettes. He started smoking about 61 years ago. He has a 17 pack-year smoking history. He has never used smokeless tobacco. He reports that he  does not drink alcohol and does not use drugs.    Allergies:  Allergies[1]    Medications:  Medications Ordered Prior to Encounter[2]        Physical Exam:  Blood pressure 156/66, pulse 70, temperature 98.1 °F (36.7 °C), temperature source Oral, resp. rate 20, height 162.6 cm (5' 4\"), weight 171 lb 8.3 oz (77.8 kg), SpO2 95%.  Wt Readings from Last 3 Encounters:   12/22/24 171 lb 8.3 oz (77.8 kg)   11/30/24 145 lb (65.8 kg)   10/22/24 132 lb 14.4 oz (60.3 kg)       General: awake, alert, oriented x 3, no acute distress  HEENT: at/nc, perrl, eomi  Neck: No JVD, carotids 2+ no bruits.  Cardiac: tachy, regular S1, S2 normal, no rub or gallop. Soft grade ii/vi systolic murmur  Lungs: Clear without wheezes, rales, rhonchi or dullness.  Normal excursions and effort.  Abdomen: Soft, non-tender, non-distended, normal bowel sounds G-tube  Extremities: Without clubbing, cyanosis or edema.  Peripheral pulses are 2+. R arm fistula L hand missing digits  Neurologic: Alert and oriented, normal affect.  Psych: normal mood and affect  Skin: Warm and dry.       Laboratories and Data:  Diagnostics:      Labs:   CBC:    Lab Results   Component Value Date    WBC 13.0 (H) 12/22/2024    WBC 12.8 (H) 12/21/2024    WBC 13.8 (H) 12/20/2024     Lab Results   Component Value Date    HGB 10.1 (L) 12/22/2024    HGB 10.8 (L) 12/21/2024    HGB 10.7 (L) 12/20/2024      Lab Results   Component Value Date    .0 12/22/2024    .0 12/21/2024    .0 12/20/2024     BMP:   No results found for: \"GLUCOSE\"  Lab Results   Component Value Date    K 3.7 12/22/2024    K 3.4 (L) 12/20/2024    K 4.1 12/20/2024     Lab Results   Component Value Date    BUN 43 (H) 12/22/2024    BUN 25 (H) 12/20/2024    BUN 37 (H) 12/20/2024     Lab Results   Component Value Date    CREATSERUM 3.10 (H) 12/22/2024    CREATSERUM 1.87 (H) 12/20/2024    CREATSERUM 2.73 (H) 12/20/2024     Cholesterol:     Lab Results   Component Value Date    CHOLEST 179 09/29/2024     CHOLEST 146 2024    CHOLEST 153 2024     Lab Results   Component Value Date    HDL 41 2024    HDL 29 (L) 2024    HDL 41 2024     Lab Results   Component Value Date    TRIG 259 (H) 10/17/2024    TRIG 160 (H) 10/04/2024    TRIG 206 (H) 10/02/2024     Lab Results   Component Value Date     (H) 2024    LDL 93 2024     (H) 2024     Lab Results   Component Value Date    AST 97 (H) 2024    AST 32 2024    AST 42 (H) 2024     Lab Results   Component Value Date     (H) 2024    ALT 29 2024    ALT 38 2024            [1]   Allergies  Allergen Reactions    Adhesive Tape OTHER (SEE COMMENTS)     Severe rashes    Dust Mite Extract RASH   [2]   Current Facility-Administered Medications on File Prior to Encounter   Medication Dose Route Frequency Provider Last Rate Last Admin    [COMPLETED] acetaminophen (Tylenol) 160 MG/5ML oral liquid 500 mg  500 mg Per G Tube Once Gabriela Allen MD   500 mg at 24 0209    [COMPLETED] gabapentin (Neurontin) 250 MG/5ML oral solution 300 mg  300 mg Per G Tube Once Gabriela Allen MD   300 mg at 24 0440    [COMPLETED] ceFAZolin (Ancef) 2g in 10mL IV syringe premix  2 g Intravenous Once Yash Sheppard MD   2 g at 10/19/24 1036    [] adult 3 in 1 TPN   Intravenous Continuous TPN Pranay Michel MD   Paused at 10/19/24 0250    [COMPLETED] sodium chloride 0.9 % IV bolus 250 mL  250 mL Intravenous Once Lorelei Mata  mL/hr at 10/17/24 0023 250 mL at 10/17/24 0023    [] adult 3 in 1 TPN   Intravenous Continuous TPN Pranay Michel MD 50 mL/hr at 10/17/24 2233 New Bag at 10/17/24 2233    [COMPLETED] dilTIAZem (cardIZEM) 25 mg/5mL injection 10 mg  10 mg Intravenous Once Pranay Michel MD   10 mg at 10/17/24 1330    [COMPLETED] sodium chloride 0.9 % IV bolus 250 mL  250 mL Intravenous Once Pranay Michel  mL/hr at 10/17/24 1700 250 mL at 10/17/24 1700     [COMPLETED] amiodarone (Cordarone) 150 mg in dextrose 5% 100 mL IV bolus  150 mg Intravenous Once Emerson Julio  mL/hr at 10/17/24 1805 150 mg at 10/17/24 1805    [COMPLETED] digoxin (Lanoxin) 250 MCG/ML injection 250 mcg  250 mcg Intravenous Once Herman Phelan MD   250 mcg at 10/17/24 1750    [] sodium chloride 0.9 % IV bolus 100 mL  100 mL Intravenous Q30 Min PRN José Callahan MD        And    [] albumin human (Albumin) 25% injection 25 g  25 g Intravenous PRN Dialysis José Callahan MD        [] dextrose 5%-sodium chloride 0.45% infusion   Intravenous Continuous Lorelei Mata MD 50 mL/hr at 10/16/24 0038 New Bag at 10/16/24 003    [] adult 3 in 1 TPN   Intravenous Continuous TPN Pranay Michel MD 50 mL/hr at 10/16/24 2118 New Bag at 10/16/24 2118    [COMPLETED] metoprolol (Lopressor) 5 mg/5mL injection 5 mg  5 mg Intravenous Once Herman Phelan MD   5 mg at 10/16/24 2307    [] sodium chloride 0.9 % IV bolus 100 mL  100 mL Intravenous Q30 Min PRN José Callahan MD        And    [] albumin human (Albumin) 25% injection 25 g  25 g Intravenous PRN Dialysis José Callahan MD        [] sodium chloride 0.9 % IV bolus 100 mL  100 mL Intravenous Q30 Min PRN Walker Klein MD        And    [] albumin human (Albumin) 25% injection 25 g  25 g Intravenous PRN Dialysis Walker Klein MD        [COMPLETED] metoprolol (Lopressor) 5 mg/5mL injection 5 mg  5 mg Intravenous Once Radha Dunham MD   5 mg at 10/12/24 0304    [COMPLETED] methylPREDNISolone sodium succinate (Solu-MEDROL) injection 40 mg  40 mg Intravenous BID Jayjay Mijares MD   40 mg at 10/13/24 0919    [] sodium chloride 0.9 % IV bolus 100 mL  100 mL Intravenous Q30 Min PRN Walker Klein MD        And    [] albumin human (Albumin) 25% injection 25 g  25 g Intravenous PRN Dialysis Walker Klein MD   25 g at 10/11/24 1900    [COMPLETED]  methylPREDNISolone sodium succinate (Solu-MEDROL) injection 60 mg  60 mg Intravenous Once Jimmie Landeros MD   60 mg at 10/09/24 0148    [COMPLETED] EPINEPHrine-racemic (S-2) 2.25 % nebulizer solution 0.5 mL  0.5 mL Nebulization Once Jimmie Landeros MD   0.5 mL at 10/09/24 0240    [] sodium chloride 0.9 % IV bolus 100 mL  100 mL Intravenous Q30 Min PRN Moni Pugh MD        And    [] albumin human (Albumin) 25% injection 25 g  25 g Intravenous PRN Dialysis Moni Pugh MD        [COMPLETED] methylPREDNISolone sodium succinate (Solu-MEDROL) injection 125 mg  125 mg Intravenous Once Gordon Borden MD   125 mg at 10/06/24 1823    [COMPLETED] EPINEPHrine-racemic (S-2) 2.25 % nebulizer solution 0.5 mL  0.5 mL Nebulization Once Ollie Santos MD   0.5 mL at 10/06/24 1812    [] methylPREDNISolone sodium succinate (Solu-MEDROL) 125 MG injection             [COMPLETED] potassium chloride (Klor-Con) 20 MEQ oral powder 20 mEq  20 mEq Oral Once Moni Pugh MD   20 mEq at 10/05/24 0556    [COMPLETED] fluconazole (Diflucan) tab 200 mg  200 mg Oral Once Lizbeth Rey MD   200 mg at 10/05/24 1610    [COMPLETED] potassium chloride (Klor-Con) 20 MEQ oral powder 10 mEq  10 mEq Oral Once Moni Pugh MD   10 mEq at 10/04/24 0736    [] ondansetron (Zofran) 4 MG/2ML injection 4 mg  4 mg Intravenous Once PRN Talha Gregg MD        [] prochlorperazine (Compazine) 10 MG/2ML injection 5 mg  5 mg Intravenous Once PRN Talha Gregg MD        [] haloperidol lactate (Haldol) 5 MG/ML injection 0.25 mg  0.25 mg Intravenous Once PRN Talha Gregg MD        [] fentaNYL (Sublimaze) 50 mcg/mL injection 25 mcg  25 mcg Intravenous Q5 Min PRN Talha Gregg MD   25 mcg at 10/04/24 1411    [] fentaNYL (Sublimaze) 50 mcg/mL injection 50 mcg  50 mcg Intravenous Q5 Min PRN Talha Gregg MD        [] HYDROmorphone (Dilaudid) 1 MG/ML injection 0.2 mg  0.2 mg Intravenous  Q15 Min PRN Talha Gregg MD        [] HYDROmorphone (Dilaudid) 1 MG/ML injection 0.2 mg  0.2 mg Intravenous Q15 Min PRN Talha Gregg MD        [] HYDROmorphone (Dilaudid) 1 MG/ML injection 0.2 mg  0.2 mg Intravenous Q15 Min PRN Talha Gregg MD        [] atropine 0.1 MG/ML injection 0.5 mg  0.5 mg Intravenous PRN Talha Gregg MD        [] naloxone (Narcan) 0.4 MG/ML injection 0.08 mg  0.08 mg Intravenous PRN Talha Gregg MD        [] sodium chloride 0.9 % IV bolus 500 mL  500 mL Intravenous Once PRN Humberto Murphy MD        [] diphenhydrAMINE (Benadryl) 50 mg/mL  injection 25 mg  25 mg Intravenous Once PRN Humberto Murphy MD        [COMPLETED] ceFAZolin (Ancef) 1 g in dextrose 5% 100mL IVPB-ADD  1 g Intravenous Q24H Humberto Murphy  mL/hr at 10/05/24 0833 1 g at 10/05/24 0833    [COMPLETED] clonidine-EPINEPHrine-ropivacaine-ketorolac (CERTS) (Duraclon-Adrenalin-Naropin-Toradol) pain cocktail irrigation   Intra-articular Once (Intra-Op) Humberto Murphy MD   Given at 10/04/24 1211    [] sodium chloride 0.9 % IV bolus 100 mL  100 mL Intravenous Q30 Min PRN Humberto Murphy MD        And    [] albumin human (Albumin) 25% injection 25 g  25 g Intravenous PRN Dialysis Humberto Murphy MD        [COMPLETED] tranexamic acid in sodium chloride 0.7% (Cyklokapron) 1000 mg/100mL infusion premix 1,000 mg  1,000 mg Intravenous 30 Min Pre-Op Humberto Murphy MD   1,000 mg at 10/04/24 1149    [COMPLETED] potassium chloride (Klor-Con) 20 MEQ oral powder 20 mEq  20 mEq Oral Once Mirta Redman MD   20 mEq at 10/02/24 2024    [COMPLETED] sodium chloride 0.9 % IV bolus 250 mL  250 mL Intravenous Once Nora Jones APRN   Stopped at 10/01/24 1150    [] sodium chloride 0.9 % IV bolus 100 mL  100 mL Intravenous Q30 Min PRN Moni Pugh MD        And    [] albumin human (Albumin) 25% injection 25 g  25 g Intravenous PRN Dialysis  Moni Pugh MD        [] piperacillin-tazobactam (Zosyn) 3.375 g in dextrose 5% 100 mL IVPB-ADDV  3.375 g Intravenous Q12H Jimmie Landeros MD 25 mL/hr at 10/04/24 1900 3.375 g at 10/04/24 1900    [COMPLETED] iopamidol 76% (ISOVUE-370) injection for power injector  80 mL Intravenous ONCE PRN Radha Gabriel MD   80 mL at 24 1126    [COMPLETED] furosemide (Lasix) 10 mg/mL injection 40 mg  40 mg Intravenous Once Nick Moreau MD   40 mg at 24 0607    [] furosemide (Lasix) 10 mg/mL injection             [COMPLETED] hydrALAzine (Apresoline) 20 mg/mL injection 10 mg  10 mg Intravenous Once Nick Moreau MD   10 mg at 24 0626    [] etomidate (Amidate) 2 mg/mL injection             [COMPLETED] propofol (Diprivan) 10 MG/ML injection        1,000 mg at 24 1436    [COMPLETED] piperacillin-tazobactam (Zosyn) 4.5 g in dextrose 5% 100 mL IVPB-ADDV  4.5 g Intravenous Once Nick Moreau  mL/hr at 24 0853 4.5 g at 24 0853    [] sodium chloride 0.9 % IV bolus 100 mL  100 mL Intravenous Q30 Min PRN José Callahan MD        [COMPLETED] nitroGLYCERIN (Nitrobid) 2 % ointment 1 inch  1 inch Topical Once Nick Moreau MD   1 inch at 24 0654    [COMPLETED] hydrALAzine (Apresoline) 20 mg/mL injection 10 mg  10 mg Intravenous Once Nick Moreau MD   10 mg at 24 0654    [COMPLETED] ondansetron (Zofran) 4 MG/2ML injection        4 mg at 24 1241    [COMPLETED] metoclopramide (Reglan) 5 mg/mL injection 5 mg  5 mg Intravenous Once Karla Irvin APRN   5 mg at 24 1300    [COMPLETED] norepinephrine (Levophed) 4 mg/250mL infusion premix        4,000 mcg at 24 1455    [COMPLETED] succinylcholine (Anectine) 20 MG/ML injection 70.6 mg  1 mg/kg Intravenous Once Atul Sheridan MD   70.6 mg at 24 1445    [COMPLETED] HYDROcodone-acetaminophen (Norco) 5-325 MG per tab 1 tablet  1 tablet Oral Once Vitor Kline MD   1 tablet at  09/28/24 1245    [COMPLETED] morphINE PF 4 MG/ML injection 4 mg  4 mg Intravenous Once Vitor Kline MD   4 mg at 09/28/24 1448    [COMPLETED] sodium chloride 0.9 % IV bolus 1,000 mL  1,000 mL Intravenous Once Vitor Kline MD 1,000 mL/hr at 09/28/24 1447 1,000 mL at 09/28/24 1447    [COMPLETED] ondansetron (Zofran) 4 MG/2ML injection 4 mg  4 mg Intravenous Once Vitor Kline MD   4 mg at 09/28/24 1448    [COMPLETED] ceFAZolin (Ancef) 2g in 10mL IV syringe premix  2 g Intravenous 30 Min Pre-Op Humberto Murphy MD   2 g at 10/04/24 0915    [COMPLETED] heparin (Porcine) 5000 UNIT/ML injection 5,000 Units  5,000 Units Subcutaneous Once Humberto Murphy MD   5,000 Units at 09/28/24 1758    [COMPLETED] dilTIAZem (cardIZEM) 25 mg/5mL injection 20 mg  20 mg Intravenous Once Vitor Kline MD   20 mg at 09/28/24 1619     Current Outpatient Medications on File Prior to Encounter   Medication Sig Dispense Refill    acetaminophen 500 MG Oral Tab 1 tablet (500 mg total) by Per G Tube route in the morning and 1 tablet (500 mg total) before bedtime.      Gabapentin 300 MG/6ML Oral Solution 300 mg by Peg Tube route in the morning and 300 mg before bedtime. 450 mL 3    linaGLIPtin (TRADJENTA) 5 mg Oral Tab 1 tablet (5 mg total) by Per G Tube route daily. 90 tablet 3    atorvastatin 40 MG Oral Tab 1 tablet (40 mg total) by Per G Tube route nightly. 90 tablet 3    amiodarone 200 MG Oral Tab 1 tablet (200 mg total) by Per G Tube route daily. 90 tablet 3    carvedilol 25 MG Oral Tab 1 tablet (25 mg total) by Per G Tube route 2 (two) times daily. 180 tablet 3    B Complex-C-Folic Acid (RENAL MULTIVITAMIN FORMULA) Oral Tab 1 tablet by Per G Tube route daily. 90 tablet 3    clopidogrel 75 MG Oral Tab Take 1 tablet (75 mg total) by mouth daily. 90 tablet 3    albuterol (PROAIR HFA) 108 (90 Base) MCG/ACT Inhalation Aero Soln Inhale 2 puffs into the lungs every 4 (four) hours as needed for Wheezing. 3 each 3    fluticasone  propionate 50 MCG/ACT Nasal Suspension 2 sprays by Nasal route daily. 48 g 3    [] amoxicillin clavulanate 250-125 MG Oral Tab 1 tablet (250 mg total) by Per G Tube route daily. Taking for swelling to left side of jaw      lansoprazole 30 MG Oral Tablet Delayed Release Dispersible 1 tablet (30 mg total) by Per G Tube route every morning before breakfast. 30 tablet 0    aspirin 81 MG Oral Tab EC Take 1 tablet (81 mg total) by mouth daily. (Patient taking differently: Take 1 tablet (81 mg total) by mouth daily. Per G-tube) 90 tablet 3    cetirizine 10 MG Oral Tab Take 1 tablet (10 mg total) by mouth every other day.      polyethylene glycol, PEG 3350, 17 g Oral Powd Pack 17 g by Per G Tube route daily as needed (Constipation).      Insulin Lispro, 1 Unit Dial, (HUMALOG KWIKPEN) 100 UNIT/ML Subcutaneous Solution Pen-injector Take subcutaneously 4 times daily before tube feedings per sliding scale. Max total dose of 20 units. (Patient not taking: Reported on 2024) 6 mL 1    [] glucagon (GVOKE HYPOPEN 2-PACK) 1 MG/0.2ML Subcutaneous injection Inject 0.2 mL (1 mg total) into the skin once as needed for Low blood glucose. 1 each 0    Insulin Pen Needle 33G X 4 MM Does not apply Misc 1 each 4 (four) times daily. 200 each 1    albuterol (2.5 MG/3ML) 0.083% Inhalation Nebu Soln Take 3 mL (2.5 mg total) by nebulization every 4 (four) hours as needed for Wheezing or Shortness of Breath. (Patient not taking: Reported on 2024) 360 mL 3    HYDROcodone-acetaminophen 5-325 MG Oral Tab 1 tablet by Per G Tube route every 8 (eight) hours as needed. (Patient not taking: Reported on 2024) 90 tablet 0    Incontinence Supply Disposable (COMFORT PROTECT ADULT DIAPER/L) Does not apply Misc 1 Application daily. 30 each 3    Electronic Thermometer ([employer] DIGITAL THERMOMETER TEMPLE) Does not apply Misc Check temperature 1 each 0    insulin glargine (LANTUS SOLOSTAR) 100 UNIT/ML Subcutaneous Solution Pen-injector  Inject 8 Units into the skin every morning. (Patient not taking: Reported on 12/6/2024) 15 mL 0    Insulin Pen Needle 32G X 4 MM Does not apply Misc Take as directed 200 each 3    Budesonide-Formoterol Fumarate (SYMBICORT) 160-4.5 MCG/ACT Inhalation Aerosol Inhale 2 puffs into the lungs 2 (two) times daily. (Patient not taking: Reported on 12/6/2024) 3 each 3    Starch-Maltodextrin (THICK-IT) Oral Powd Pack Use as directed (Patient not taking: Reported on 12/6/2024) 200 each 3    albuterol (2.5 MG/3ML) 0.083% Inhalation Nebu Soln Take 3 mL (2.5 mg total) by nebulization in the morning and 3 mL (2.5 mg total) before bedtime. (Patient not taking: Reported on 12/3/2024)      lidocaine 4 % External Patch Place 1 patch onto the skin daily. (Patient not taking: Reported on 12/3/2024)      acetaminophen 500 MG Oral Tab Take 1 tablet (500 mg total) by mouth every 6 (six) hours as needed for Pain. (Patient not taking: Reported on 12/6/2024)      Cholecalciferol 125 MCG (5000 UT) Oral Tab Take 1 tablet (5,000 Units total) by mouth 2 (two) times daily. (Patient not taking: Reported on 12/3/2024)

## 2024-12-23 NOTE — DISCHARGE SUMMARY
Dc summary#[id number]  >30min spent on dc    Hospital Discharge Diagnoses:  intractable neuropathy and weakness    Lace+ Score: 88  59-90 High Risk  29-58 Medium Risk  0-28   Low Risk.    TCM Follow-Up Recommendation:  LACE > 58: High Risk of readmission after discharge from the hospital.  Tcm fu recommended

## 2024-12-23 NOTE — PROGRESS NOTES
[facility] Contact information was given and all questions were addressed.

## 2024-12-24 NOTE — DISCHARGE SUMMARY
[facility]    PATIENT'S NAME: MELISSA ISRAEL   ATTENDING PHYSICIAN: Dee Joyce MD   PATIENT ACCOUNT#:   [id number]    LOCATION:  [address] [place] [address]  MEDICAL RECORD #:   [id number]       YOB: 1945  ADMISSION DATE:       12/06/2024      DISCHARGE DATE:  12/23/2024    DISCHARGE SUMMARY    About 45 minutes were spent preparing this discharge, counseling his wife, coordinating care with Dr. Phelan and otherwise preparing discharge.     HISTORY AND HOSPITAL COURSE:  This is a 79-year-old -American male who presented to the hospital with a history of bilateral leg pain.  He is diabetic and seems to have had neuropathy for some time and nothing seems to control the neuropathy.  We tried various medications and he just became confused on narcotics.  He had a history of coronary artery disease and also renal failure so Renal was consulted to manage his dialysis.  We called the pain service as the patient's pain was very difficult to control.  I ended up doing an MRI which showed multilevel disc disease and the patient had a Watchman device so he could not be anticoagulated and Cardiology cleared him to have the MRI.      The patient had to have Plavix held for many days for the steroid injection and, after the steroid injection, the pain did not seem to subside for a few days but he slowly improved to the point that I thought that I could discharge him on 12/20.  However, he had episodes of hypotension and rapid response atrial fibrillation.  Cardiology came back to adjust the medications and, today, he had dialysis without incident and I sent him to rehab.      PHYSICAL EXAMINATION ON DISCHARGE:    VITAL SIGNS:  Temperature is 97.6, pulse 69, respiratory rate 18, blood pressure 138/59, 94%.  LUNGS:  Occasional rhonchi.  HEART:  Normal S1, S2.  No S3.  ABDOMEN:  Soft.    EXTREMITIES:  Without calf tenderness.  NEUROLOGIC:  He is alert and oriented, friendly and cooperative.    LABORATORY  STUDIES:  Please see chart.      ASSESSMENT AND PLAN:    1.   Intractable neuropathy and sciatica-like pain, now better.  Continue pain medications.  2.   End-stage renal disease.  Dialysis Monday, Wednesday, and Friday.  3.   Acute respiratory failure with confusion, possible aspiration.  Completed antibiotics.  Doing well.  Weaned down to 1L but cannot seem to wean off.  Will discharge on oxygen.  4.   Anemia of chronic kidney disease, and iron deficiency.  Patient was transfused during this hospitalization.  5.   Atrial fibrillation rapid response.  Has a Watchman.  No anticoagulation.  Lopressor increased to control rates.  6.   Type 2 diabetes.    7.   Obstructive sleep apnea.  Near obesity.  BMI 29.44.  Continue treatment.  8.   Hypertension.    9.   Chronic diastolic congestive heart failure.  10.   Pulmonary hypertension.  11.   Abdominal bloating from medications.      CONDITION ON DISCHARGE:  Stable.    CODE STATUS:  Full Code.    DIET:  Tube feedings as ordered here.  He does not take oral feedings at all.     ACTIVITIES:  PT and OT; otherwise, as tolerated.    FOLLOWUP:    1.   Dr. Parmar after rehab discharge.  2.   Dr. Julio in 2 weeks and for all amiodarone dosing and renewal, etc.    3.   Dr. Pugh in 2 weeks and for dialysis.  4.   Dr. Huggins in 2 weeks.     5.   PT and OT.  CBC, BMP, magnesium, phosphorus on Thursday to be called to Rehab MD.  Dr. Parmar or his designate to see in 1 to 2 days.    DISCHARGE MEDICATIONS:    1.   Albuterol nebulizer every 4 hours as needed for wheezing.  2.   ProAir 2 puffs every 4 hours as needed for more mild wheezing.  3.   Multivitamin once a day.    4.   Renal multivitamin.  5.   Symbicort 160/4.5 two puffs twice a day.  Rinse mouth after use.  6.   Cetirizine 10 mg every other day.  7.   Tylenol 500 mg in the morning and at bedtime.  8.   Amiodarone 200 mg twice a day for a week and then 200 mg daily.  Please note, Cardiology will be responsible for all  dosing, renewal, and decisions about continuing this medicine.  I produced this regimen reading Dr. Phelan's note from today.    9.   Ecotrin 81 mg daily.  10.   Atorvastatin 40 mg nightly.  Watch for muscle weakness.  11.   Plavix 75 mg daily.    12.   Fluticasone 2 sprays to each nostril daily.  13.   Gabapentin 300 mg twice a day.   14.   Tresiba 5 units nightly.  15.   Prevacid 30 mg per G-tube every morning.  16.   MiraLAX 17 g daily.  17.   Diltiazem 60 mg every 6 hours.    18.   Erythropoietin 10,000 units 3 times a day.  19.   Regular insulin sliding scale.  20.   Lidocaine patch.  21.   Metoprolol 150 mg twice a day.  22.   Pamelor 10 mg nightly per G-tube.  23.   Zenpep as needed to unclog G-Tube.  24.   Senna 17.2 mg nightly.  25.   Simethicone 125 mg four times a day as needed.  26.   Sodium bicarbonate as needed to unclog tube.  27.   Tramadol 50 mg every 12 hours as needed.  Watch for drowsiness.  No alcohol.      RISK OF READMISSION:  High.  TCM followup is recommended.     Dictated By Dee Joyce MD  d: 12/23/2024 17:46:51  t: 12/24/2024 04:05:16  [facility] [id number]  [facility]/

## 2024-12-26 NOTE — PAYOR COMM NOTE
--------------  DISCHARGE REVIEW    Payor: [employer]  Subscriber #:  [id number]  Authorization Number: [id number]    Admit date: 12/6/24  Admit time:   5:03 PM  Discharge Date: 12/23/2024  1:25 PM     DISCHARGE SUMMARY    MELISSA ISRAEL [id number]   YOB: 1945 [id number]     DISCHARGE SUMMARY    About 45 minutes were spent preparing this discharge, counseling his wife, coordinating care with Dr. Phelan and otherwise preparing discharge.     HISTORY AND HOSPITAL COURSE:  This is a 79-year-old -American male who presented to the hospital with a history of bilateral leg pain.  He is diabetic and seems to have had neuropathy for some time and nothing seems to control the neuropathy.  We tried various medications and he just became confused on narcotics.  He had a history of coronary artery disease and also renal failure so Renal was consulted to manage his dialysis.  We called the pain service as the patient's pain was very difficult to control.  I ended up doing an MRI which showed multilevel disc disease and the patient had a Watchman device so he could not be anticoagulated and Cardiology cleared him to have the MRI.      The patient had to have Plavix held for many days for the steroid injection and, after the steroid injection, the pain did not seem to subside for a few days but he slowly improved to the point that I thought that I could discharge him on 12/20.  However, he had episodes of hypotension and rapid response atrial fibrillation.  Cardiology came back to adjust the medications and, today, he had dialysis without incident and I sent him to rehab.

## 2025-01-02 ENCOUNTER — TELEPHONE (OUTPATIENT)
Facility: CLINIC | Age: 80
End: 2025-01-02

## 2025-01-02 NOTE — TELEPHONE ENCOUNTER
Dr. Martinez, Patient's spouse, Stephy, is requesting to spek to you. Please see message below. Call back number is: [phone]. Thank you.

## 2025-03-24 PROBLEM — M48.062 SPINAL STENOSIS OF LUMBAR REGION WITH NEUROGENIC CLAUDICATION: Status: ACTIVE | Noted: 2025-03-24

## 2025-03-24 PROBLEM — M79.604 LEG PAIN, BILATERAL: Status: ACTIVE | Noted: 2025-03-24

## 2025-03-24 PROBLEM — M79.605 LEG PAIN, BILATERAL: Status: ACTIVE | Noted: 2025-03-24

## 2025-04-24 ENCOUNTER — PATIENT OUTREACH (OUTPATIENT)
Dept: CASE MANAGEMENT | Age: 80
End: 2025-04-24

## 2025-04-24 NOTE — PROGRESS NOTES
Attempt to reach Ollie Betty to do CCM Monthly call. Left message for patient to call CCM to do monthly.    Total time -  4 min.  Total Monthly time-  4 min.   Next Care Manager Follow Up Date: 2-4 Weeks.

## 2025-06-12 ENCOUNTER — PATIENT OUTREACH (OUTPATIENT)
Dept: CASE MANAGEMENT | Age: 80
End: 2025-06-12

## 2025-06-16 ENCOUNTER — TELEPHONE (OUTPATIENT)
Dept: INTERNAL MEDICINE CLINIC | Facility: CLINIC | Age: 80
End: 2025-06-16

## 2025-06-16 NOTE — TELEPHONE ENCOUNTER
[facility] tried to contact patient for assessment and per Stephy (wife), he passed away on 01/25/2025.    Please update the record.

## 2025-06-16 NOTE — PROGRESS NOTES
The patient was contacted by [facility] for monthly assessment and per wife the patient passed away, he  on 2025.

## 2025-07-29 NOTE — DIETARY NOTE
ADULT NUTRITION REASSESSMENT    Pt is at high nutrition risk.  Pt does not meet malnutrition criteria.      RECOMMENDATIONS TO MD:    See nutrition intervention for Peripheral Parenteral Nutrition ( PPN) specifics    ADMITTING DIAGNOSIS:  Closed fracture of left hip, initial encounter ([facility]) [S72.002A]  PERTINENT PAST MEDICAL HISTORY:   Past Medical History:    Anemia    Asthma ([facility])    Back problem    BPH (benign prostatic hyperplasia)    Calculus of kidney    Cataract    Coronary atherosclerosis    Diabetes ([facility])    ESRD (end stage renal disease) on dialysis ([facility])    Essential hypertension    High blood pressure    High cholesterol    History of blood transfusion    Hyperlipidemia    Neuropathy    hands and feet    KRAIG on CPAP    Renal disorder    Sleep apnea    Visual impairment    glasses    Vocal cord paralysis, unilateral partial       PATIENT STATUS: Initial 10/02/24: Pt admit for displaced left femoral neck fracture with uncontrolled pain-possible surgery planned. Pt intubated on 9/29 for respiratory failure. There was a concern for GI bleed thus held off on tube feeds. PMH as above.and notable for ESRD HD.   Pt assessed due to consult for tube feeding. Pt NPO day# 4. (See brief note per RD 9/30). Discussed with RN last pm re: potential tube feeding start pending Ortho surgeon jonathan . GI status improved. Propofol low dose in progress (~220 kcals/lipids) Pt screened at no nutrition risk on admit however weight hx reflects a 10% wt loss over past ~ 1 month: this is a severe weight loss. Will clarify with family as able.  Current BMI reflects wt/ht wnl. Diet hx to be obtained.   Appropriate to start Nepro formula-see orders below. No documentation of tube feed start- possible holding for surgery or possible extubation? Will discuss at rounds today. Addendum: TF ordered late last pm- did not start.    10/7/24: s/p L FRANCE 10/4. Wound vac to L.hip. Tube feeds initiated 10/2 am but stopped 10/6 with extubation and OG  tube out. Calculated ave tf intake 10/2-10/6 and only ~ 500 kcals/day- EN was held for high GRV10/3. Overall nutrition intake poor- not safe for po intake today s/p SLP BSSE. Met with Pt and spouse. Pt c/o \"sore throat\" so does not want to eat/swallow. Discussed nutrition plan of care. Per spouse pt had good appetite pta following renal diet, higher in protein per renal MD recommendations. Feels wt loss was not intentional but had adjusted diet somewhat for higher protein intake ie more fish, chicken, protein bars. Hope to advance diet tomorrow vs need replace feeding tube.   10/11/24 UPDATE: Pt sleeping and undergoing HD at time of visit. Spouse at bedside. Spouse reports pt's appetite/intake is improving since extubation. Pt skipped lunch today r/t gets nauseated with HD. Spouse ordering dinner tray now. Declining ONS at this time. Reports pt is unhappy with thickened liquids. Per spouse typical dry wt 154 lbs.      10/16/2024 Update:   Received MD consult to initiate/manage CPN/PPN  Swallow eval via VFSS noted. SLP recommended NPO with alternate route for Nutrition/hydration d/t severity of dysphagia with aspiration. Discussing plan PEG placement not until Saturday 10/19.  Labs and meds reviewed.    Re-visited pt, on going Hemodialysis at bedside, using 2K Bath. Pt appears lethargic. RN reports recently given dilaudid. Currently with IV D5 1/2 NS @ 45 ml/hr ( 1020 ml/day).    Provision for PPN indicated until able to utilize Enteral Nutrition (EN). Recommend to discontinue IVF when PPN starts tonight via peripheral line. No K, Mg and P in PPN solution and using low volume 1200 ml PPN. May likely need to re-eval if phos binder (renvela) is still needed while pt is NPO, discussed with RN. Reqeusted for new wt. Also d/w with RNs to watch for PPN initiation intolerance.       FOOD/NUTRITION RELATED HISTORY:  Appetite: Good PTA. Improving.  Intake: prior to NPO pt was eating 10-60% , average meal intake  29%--inadequate to meet nutrition needs.   Intake Meeting Needs: PPN initiated to support Nutrition while awaiting for EN.   Percent Meals Eaten (last 3 days)       Date/Time Percent Meals Eaten (%)    10/13/24 0951 60 %    10/13/24 1317 35 %    10/14/24 1040 10 %    10/14/24 1557 10 %    10/14/24 1904 10 %    10/15/24 0901 50 %        Food Allergies: No Known Food Allergies (NKFA)  Cultural/Ethnic/Sabianism Preferences: Not Obtained    GASTROINTESTINAL: +BM 10 14 soft loose brown med stool x 1 , severe dysphagia per SLP via VFSS. rounded/distended, with active bowel sounds.  : U/O : anuric. On HD.     MEDICATIONS: reviewed;  scheduled colace   dextrose 10%      adult 3 in 1 TPN      sodium chloride      sodium chloride      dextrose 10% Stopped (10/05/24 1910)      lansoprazole  30 mg Oral BID AC    ipratropium-albuterol  3 mL Nebulization 2 times daily    insulin degludec  10 Units Subcutaneous Daily    insulin aspart  3 Units Subcutaneous TID CC    clopidogrel  75 mg Oral Daily    carvedilol  25 mg Oral BID    sevelamer carbonate  800 mg Oral TID CC    insulin aspart  1-7 Units Subcutaneous TID CC and HS    atorvastatin  40 mg Oral Nightly    epoetin donna  5,000 Units Subcutaneous Once per day on Monday Wednesday Friday    heparin  5,000 Units Subcutaneous Q8H STEPHANIE    senna  10 mL Oral BID    docusate  100 mg Oral BID    mineral oil-white petrolatum  1 Application Both Eyes Nightly    lidocaine-menthol  2 patch Transdermal Daily    cetirizine  5 mg Per NG Tube Daily    fluticasone-salmeterol  1 puff Inhalation BID     LABS: reviewed; labs c/w ESRD on HD; hyperphosphatemia.   Labs shown are pre-dialysis labs, anticipate changes post HD today.   Recent Labs     10/14/24  0427 10/15/24  1222 10/16/24  0421   * 207* 122*   * 86* 97*   CREATSERUM 8.16* 7.54* 8.70*   CA 10.1 10.0 10.1   MG 2.5  --  2.4    139 138   K 4.7 4.9 4.7    101 102   CO2 24.0 24.0 25.0   PHOS  --   --  7.6*   OSMOCALC  330* 320* 317*     NUTRITION RELATED PHYSICAL FINDINGS:  - Nutrition Focused Physical Exam (NFPE):  severe wt loss was charted at initial assessment    - Fluid Accumulation: non-pitting Right Lower extremity and generalized see RN documentation for details  - Skin Integrity: surgical wound(s) hip with vac.  see RN documentation for details    ANTHROPOMETRICS:  HT:  5'4\"   WT: 65.6 kg (144 lb 10 oz)--no new weight.. Requested from RN to obtain new wt.    BMI: Body mass index is 24.81 kg/m².  BMI CLASSIFICATION: 19-24.9 kg/m2 - WNL  IBW: 130  lbs        111% IBW  Usual Body Wt: 154 lbs (typical dry wt)      94% UBW (6% loss/1 month: severe loss)     WEIGHT HISTORY:  Patient Weight(s) for the past 336 hrs:   Weight   10/05/24 0600 65.6 kg (144 lb 10 oz)   10/04/24 0600 66 kg (145 lb 8.1 oz)   10/02/24 1310 70.9 kg (156 lb 4.9 oz)     Wt Readings from Last 10 Encounters:   10/05/24 65.6 kg (144 lb 10 oz)   08/22/24 72.5 kg (159 lb 12.8 oz)   08/20/24 71.7 kg (158 lb)   08/13/24 65.6 kg (144 lb 11.2 oz)   08/06/24 70.8 kg (156 lb)   08/01/24 66.5 kg (146 lb 8 oz)   06/27/24 73.9 kg (163 lb)   06/13/24 71 kg (156 lb 8 oz)   06/05/24 68.2 kg (150 lb 4.8 oz)   05/28/24 74.2 kg (163 lb 9.6 oz)     NUTRITION DIAGNOSIS/PROBLEM:   Inadequate oral intake related to Decreased ability to consume sufficient energy in the setting of intubation as evidenced by NPO, 0 nutrition intake.   Nutrition Diagnosis Progress:  Improvement (continue) , npo d/t dysphagia, PPN support therapy is initiated.     NUTRITION INTERVENTION:     NUTRITION PRESCRIPTION:   Estimated Nutrition needs: --dosing wt of 65  kg - wt taken on 10/2  Calories: 1950 -2275 calories/day (-30-35 calories per kg Dosing wt) higher needs d/t HD and wt loss.   Protein: 78-85 g protein/day (1.2-1.3  g protein/kg Dosing wt) --use lower limit while creat remains very high.   Fluid Needs: 1300 ml (20 ml/kcal). Adjust per clinical status (ESRD, anuric)     - Diet:        Procedures    NPO   - Parenteral Nutrition: RD ordered the following  PPN 1200 ml, 50 g Protein, 200 kcal dextrose & 500 kcal lipid. 900 Total kcal .   Additives and electrolytes ordered.   Met 46% of kcal and 64% of Protein needs. Osmolarity status limits increase of Macronutrients.  Recommend to discontinue  IV fluids when PPN starts tonight.   PPN to infuse via peripheral line.    - Medical Food Supplements: NPO  - Vitamin and mineral supplements: npo  - Feeding assistance: NPO  - Nutrition education: assess education needs   - Coordination of nutrition care: collaboration with other providers and discussed in Care Rounds   - Discharge and transfer of nutrition care to new setting or provider: monitor plans      MONITOR AND EVALUATE/NUTRITION GOALS:  - Food and Nutrient Intake:    Monitor: N/A  - Food and Nutrient Administration:    Monitor: for PPN initiation, tolerance and adequacy.   - Anthropometric Measurement:    Monitor weight  - Nutrition Goals:    labs within acceptable limits, minimize lean body mass loss, support body systems, halt true wt loss, and Transition to EN .      DIETITIAN FOLLOW UP: RD to follow      Sabine Acosta RD, LDN, [facility]  Clinical Dietitian  [phone]         (E4) spontaneous

## (undated) DIAGNOSIS — N18.4 CKD (CHRONIC KIDNEY DISEASE) STAGE 4, GFR 15-29 ML/MIN (HCC): Primary | ICD-10-CM

## (undated) DEVICE — CANNULA EYE IRRIGATING TIP 30G

## (undated) DEVICE — PREP BETADINE SOL 5% EYE

## (undated) DEVICE — Device: Brand: DUAL NARE NASAL CANNULAE FEMALE LUER CON 7FT O2 TUBE

## (undated) DEVICE — NEEDLE SPNL 22GA L3.5IN PP ATRAUM TIP PENCAN

## (undated) DEVICE — APPLICATOR PREP 26ML CHG 2% ISO ALC 70%

## (undated) DEVICE — NEEDLE SPNL 25GA L3.5IN BLU QNCKE STYL DISP

## (undated) DEVICE — KIT CLEAN ENDOKIT 1.1OZ GOWNX2

## (undated) DEVICE — STANDARD HYPODERMIC NEEDLE,POLYPROPYLENE HUB: Brand: MONOJECT

## (undated) DEVICE — MEDI-VAC NON-CONDUCTIVE SUCTION TUBING 6MM X 1.8M (6FT.) L: Brand: CARDINAL HEALTH

## (undated) DEVICE — KIT ENDO ORCAPOD 160/180/190

## (undated) DEVICE — INTRODUCER SHTH 10FR 2.5CM 25CM DIL KINK RST PINNACLE

## (undated) DEVICE — SNARE OPTMZ PLPCTM TRP

## (undated) DEVICE — CLEARCUT® SIDEPORT KNIFE DUAL BEVEL 1.0MM ANGLED: Brand: CLEARCUT®

## (undated) DEVICE — GAMMEX® PI HYBRID SIZE 7, STERILE POWDER-FREE SURGICAL GLOVE, POLYISOPRENE AND NEOPRENE BLEND: Brand: GAMMEX

## (undated) DEVICE — SYRINGE, LUER SLIP, STERILE, 60ML: Brand: MEDLINE

## (undated) DEVICE — SKIN PREP TRAY 4 COMPARTM TRAY: Brand: MEDLINE INDUSTRIES, INC.

## (undated) DEVICE — FEMORAL CANAL PRESSURIZER WITHOUT HUB, MEDIUM

## (undated) DEVICE — NEEDLE CYSTOTOME W/25G CANNULA

## (undated) DEVICE — CATARACT PATIENT CARE KIT

## (undated) DEVICE — CONMED SCOPE SAVER BITE BLOCK, 20X27 MM: Brand: SCOPE SAVER

## (undated) DEVICE — FORCEP RADIAL JAW 4

## (undated) DEVICE — GAMMEX® PI HYBRID SIZE 7.5, STERILE POWDER-FREE SURGICAL GLOVE, POLYISOPRENE AND NEOPRENE BLEND: Brand: GAMMEX

## (undated) DEVICE — HOOD: Brand: FLYTE

## (undated) DEVICE — Device: Brand: MALYUGIN RING SYSTEM 7.0MM

## (undated) DEVICE — BANDAGE ROLL,100% COTTON, 6 PLY, LARGE: Brand: KERLIX

## (undated) DEVICE — GUIDEWIRE 150CM 3MM RDS J CRV .035IN VASC PTFE FX CORE LF

## (undated) DEVICE — CATHETER US VERISIGHT PRO 9F 90CM ICE DFLC 120D 840 ELEMENTS

## (undated) DEVICE — KIT NEG PRSS PREVENA DRAIN 20CM INCIS MGMT PEEL PALACE

## (undated) DEVICE — 1535-1 MICROPORE DISPENSER PK 1" X 10YDS, 12 RLS/BX: Brand: 3M™ MICROPORE™

## (undated) DEVICE — Device: Brand: JELCO

## (undated) DEVICE — SHEATH 85CM 180CM 1 CRV PGTL GUIDE CNCT CBL DIL VERSACROSS

## (undated) DEVICE — MEDI-VAC NON-CONDUCTIVE SUCTION TUBING: Brand: CARDINAL HEALTH

## (undated) DEVICE — Device

## (undated) DEVICE — CLEARCUT® SLIT KNIFE INTREPID MICRO-COAXIAL SYSTEM 2.4 SB: Brand: CLEARCUT®; INTREPID

## (undated) DEVICE — YANKAUER,BULB TIP,W/O VENT,RIGID,STERILE: Brand: MEDLINE

## (undated) DEVICE — FIAPC® PROBE W/ FILTER 2200 A OD 2.3MM/6.9FR; L 2.2M/7.2FT: Brand: ERBE

## (undated) DEVICE — NEEDLE SPNL 18GA L3.5IN W/ QNCKE SHARPER BVL

## (undated) DEVICE — EYE PACK: Brand: MEDLINE INDUSTRIES, INC.

## (undated) DEVICE — DEVICE WATCHMAN FXD CRV 15FR 12FR 2 CRV ACC SYS CLSR LT ATR

## (undated) DEVICE — 3M™ DURAPORE™ SURGICAL TAPE 2 INCHES X 10YARDS (5.0CM X 9.1M) 6ROLLS/CARTON 10CARTONS/CASE 1538-2: Brand: 3M™ DURAPORE™

## (undated) DEVICE — CAPSULE ENDOSCP 11.4X26.2MM BIOCOMPATIBLE

## (undated) DEVICE — HOLDER RESTRN 3X13IN THK0.25IN STRP L31IN

## (undated) DEVICE — HANDLE SUR BLU PLAS LT FLX SLIP ON ST DISP

## (undated) DEVICE — SPONGE 4X4 10PK

## (undated) DEVICE — ACTIVE FMS W/ INTREPID* ULTRA SLEEVES, 0.9MM 30° ABS* INTREPID* BALANCED TIP: Brand: ALCON

## (undated) DEVICE — REM POLYHESIVE ADULT PATIENT RETURN ELECTRODE: Brand: VALLEYLAB

## (undated) DEVICE — SINGLE USE MEDICAL DEVICE FOR OPHTHALMIC SURGERY: Brand: SIL. COATED I/A 45 MIL 12/B

## (undated) DEVICE — Device: Brand: STABLECUT®

## (undated) DEVICE — SWABSTICK MED 10% POVIDONE IOD 3

## (undated) DEVICE — FEMORAL CANAL BRUSH, IRRIGATION/SUCTION

## (undated) DEVICE — SHEET,DRAPE,53X77,STERILE: Brand: MEDLINE

## (undated) DEVICE — 35 ML SYRINGE LUER-LOCK TIP: Brand: MONOJECT

## (undated) DEVICE — SOLUTION IV 1000ML 0.9% NACL PRESERVATIVE

## (undated) DEVICE — PACK CDS ANTERIOR HIP

## (undated) DEVICE — 60 ML SYRINGE LUER-LOCK TIP: Brand: MONOJECT

## (undated) DEVICE — SUT VCRL 1-0 36IN CTX ABSRB UD L48MM 1/2 CIR

## (undated) DEVICE — SINGLE-USE POLYPECTOMY SNARE: Brand: CAPTIFLEX

## (undated) DEVICE — Device: Brand: DEFENDO AIR/WATER/SUCTION AND BIOPSY VALVE

## (undated) DEVICE — DRAIN SPONGES,6 PLY: Brand: EXCILON

## (undated) DEVICE — ANTIBACTERIAL UNDYED BRAIDED (POLYGLACTIN 910), SYNTHETIC ABSORBABLE SUTURE: Brand: COATED VICRYL

## (undated) DEVICE — 12 ML SYRINGE LUER-LOCK TIP: Brand: MONOJECT

## (undated) DEVICE — STERILE POLYISOPRENE POWDER-FREE SURGICAL GLOVES: Brand: PROTEXIS

## (undated) DEVICE — BSS BAG CENTURION

## (undated) DEVICE — Device: Brand: CUSTOM PROCEDURE KIT

## (undated) DEVICE — HANDPIECE SET WITH HIGH FLOW TIP AND SUCTION TUBE: Brand: INTERPULSE

## (undated) DEVICE — 3M™ STERI-STRIP™ REINFORCED ADHESIVE SKIN CLOSURES, R1547, 1/2 IN X 4 IN (12 MM X 100 MM), 6 STRIPS/ENVELOPE: Brand: 3M™ STERI-STRIP™

## (undated) DEVICE — SNARE ENDOSCOPIC 10MM ROUND

## (undated) DEVICE — GAMMEX® NON-LATEX PI ORTHO SIZE 8, STERILE POLYISOPRENE POWDER-FREE SURGICAL GLOVE: Brand: GAMMEX

## (undated) DEVICE — E-Z CLEAN, NON-STICK, PTFE COATED, ELECTROSURGICAL BLADE ELECTRODE, MODIFIED EXTENDED INSULATION, 4 INCH (10.2 CM): Brand: MEGADYNE

## (undated) DEVICE — LINE MNTR ADLT SET O2 INTMD

## (undated) DEVICE — SHEATH 10FR 10CM 2.5CM INTRO SNAP ON DIL LOCK KINK RST SMTH

## (undated) DEVICE — GAMMEX® NON-LATEX PI ORTHO SIZE 7, STERILE POLYISOPRENE POWDER-FREE SURGICAL GLOVE: Brand: GAMMEX

## (undated) DEVICE — CO2 CANNULA,SSOFT,ADLT,7O2,4CO2,FEMALE: Brand: MEDLINE

## (undated) DEVICE — STERILE LATEX POWDER-FREE SURGICAL GLOVESWITH NITRILE COATING: Brand: PROTEXIS

## (undated) DEVICE — CANNULA ANTERIOR CHAMBER 25G

## (undated) DEVICE — TRAP POLYP W/ 2 SPEC TY CLR MAGNIFYING WIND

## (undated) DEVICE — SOL H2O 1000ML BTL

## (undated) DEVICE — V2 SPECIMEN COLLECTION MANIFOLD KIT: Brand: NEPTUNE

## (undated) DEVICE — CATHETER 5FR PGTL CRV 110CM FULL LGTH WIRE BRAID ROBUST

## (undated) DEVICE — DRAPE,U/ SHT,SPLIT,PLAS,STERIL: Brand: MEDLINE

## (undated) DEVICE — SUCTION CANISTER, 3000CC,SAFELINER: Brand: DEROYAL

## (undated) DEVICE — TOWEL,OR,DSP,ST,BLUE,DLX,2/PK,40PK/CS: Brand: MEDLINE

## (undated) DEVICE — SUT MCRYL 3-0 27IN ABSRB UD L24MM PS-1

## (undated) DEVICE — INTENDED USE FOR SURGICAL MARKING ON INTACT SKIN, ALSO PROVIDES A PERMANENT METHOD OF IDENTIFYING OBJECTS IN THE OPERATING ROOM: Brand: WRITESITE® PLUS MINI PREP RESISTANT MARKER

## (undated) DEVICE — SOLUTION IRRIG 1000ML 0.9% NACL USP BTL

## (undated) DEVICE — CEMENT MIXING SYSTEM WITH FEMORAL BREAKWAY NOZZLE: Brand: REVOLUTION

## (undated) DEVICE — BIPOLAR SEALER 23-112-1 AQM 6.0: Brand: AQUAMANTYS™

## (undated) DEVICE — DRAPE L4 APRTR BRR PRTC 42X29IN 4.5X3.5IN SURG ACTI-GARD

## (undated) DEVICE — DRESSING TRANS 4.75X4IN ADH HPOAL WTPRF TEGADERM PU STD STRL

## (undated) DEVICE — TRAY EPI NDL 18GA L3.5IN 5ML GLS LUERLOCK LOR

## (undated) DEVICE — 60 ML SYRINGE REGULAR TIP: Brand: MONOJECT

## (undated) DEVICE — DEVICE FXD CURVE WITH WATCHMAN FLX PRO PER PROCEDURE

## (undated) DEVICE — GAMMEX® PI HYBRID SIZE 6.5, STERILE POWDER-FREE SURGICAL GLOVE, POLYISOPRENE AND NEOPRENE BLEND: Brand: GAMMEX

## (undated) NOTE — LETTER
[address], [place], [place]    Authorization for Surgical Operation and Procedure                               I hereby authorize Humberto Murphy MD, my physician and his/her assistants (if applicable), which may include medical students, residents, and/or fellows, to perform the following surgical operation/ procedure and administer such anesthesia as may be determined necessary by my physician: Operation/Procedure name (s) LEFT HIP CEMENTED HEMIARTHROPLASTY, ANTERIOR APPROACH on Ollie Hernández   2.   I recognize that during the surgical operation/procedure, unforeseen conditions may necessitate additional or different procedures than those listed above.  I, therefore, further authorize and request that the above-named surgeon, assistants, or designees perform such procedures as are, in their judgment, necessary and desirable.    3.   My surgeon/physician has discussed prior to my surgery the potential benefits, risks and side effects of this procedure; the likelihood of achieving goals; and potential problems that might occur during recuperation.  They also discussed reasonable alternatives to the procedure, including risks, benefits, and side effects related to the alternatives and risks related to not receiving this procedure.  I have had all my questions answered and I acknowledge that no guarantee has been made as to the result that may be obtained.    4.   Should the need arise during my operation/procedure, which includes change of level of care prior to discharge, I also consent to the administration of blood and/or blood products.  Further, I understand that despite careful testing and screening of blood or blood products by collecting agencies, I may still be subject to ill effects as a result of receiving a blood transfusion and/or blood products.  The following are some, but not all, of the potential risks that can occur: fever and allergic reactions, hemolytic  reactions, transmission of diseases such as Hepatitis, AIDS and Cytomegalovirus (CMV) and fluid overload.  In the event that I wish to have an autologous transfusion of my own blood, or a directed donor transfusion, I will discuss this with my physician.  Check only if Refusing Blood or Blood Products  I understand refusal of blood or blood products as deemed necessary by my physician may have serious consequences to my condition to include possible death. I hereby assume responsibility for my refusal and release the hospital, its personnel, and my physicians from any responsibility for the consequences of my refusal.    o  Refuse   5.   I authorize the use of any specimen, organs, tissues, body parts or foreign objects that may be removed from my body during the operation/procedure for diagnosis, research or teaching purposes and their subsequent disposal by hospital authorities.  I also authorize the release of specimen test results and/or written reports to my treating physician on the hospital medical staff or other referring or consulting physicians involved in my care, at the discretion of the Pathologist or my treating physician.    6.   I consent to the photographing or videotaping of the operations or procedures to be performed, including appropriate portions of my body for medical, scientific, or educational purposes, provided my identity is not revealed by the pictures or by descriptive texts accompanying them.  If the procedure has been photographed/videotaped, the surgeon will obtain the original picture, image, videotape or CD.  The hospital will not be responsible for storage, release or maintenance of the picture, image, tape or CD.    7.   I consent to the presence of a  or observers in the operating room as deemed necessary by my physician or their designees.    8.   I recognize that in the event my procedure results in extended X-Ray/fluoroscopy time, I may develop a skin  reaction.    9. If I have a Do Not Attempt Resuscitation (DNAR) order in place, that status will be suspended while in the operating room, procedural suite, and during the recovery period unless otherwise explicitly stated by me (or a person authorized to consent on my behalf). The surgeon or my attending physician will determine when the applicable recovery period ends for purposes of reinstating the DNAR order.  10. Patients having a sterilization procedure: I understand that if the procedure is successful the results will be permanent and it will therefore be impossible for me to inseminate, conceive, or bear children.  I also understand that the procedure is intended to result in sterility, although the result has not been guaranteed.   11. I acknowledge that my physician has explained sedation/analgesia administration to me including the risk and benefits I consent to the administration of sedation/analgesia as may be necessary or desirable in the judgment of my physician.    I CERTIFY THAT I HAVE READ AND FULLY UNDERSTAND THE ABOVE CONSENT TO OPERATION and/or OTHER PROCEDURE.     ____________________________________  _________________________________        ______________________________  Signature of Patient    Signature of Responsible Person                Printed Name of Responsible Person                                      ____________________________________  _____________________________                ________________________________  Signature of Witness        Date  Time         Relationship to Patient    STATEMENT OF PHYSICIAN My signature below affirms that prior to the time of the procedure; I have explained to the patient and/or his/her legal representative, the risks and benefits involved in the proposed treatment and any reasonable alternative to the proposed treatment. I have also explained the risks and benefits involved in refusal of the proposed treatment and alternatives to the proposed  treatment and have answered the patient's questions. If I have a significant financial interest in a co-management agreement or a significant financial interest in any product or implant, or other significant relationship used in this procedure/surgery, I have disclosed this and had a discussion with my patient.     _____________________________________________________              _____________________________  (Signature of Physician)                                                                                         (Date)                                   (Time)  Patient Name: Ollie Hernández      : 1945      Printed: 2024     Medical Record #: [id number]                                      Page 1 of 1

## (undated) NOTE — MR AVS SNAPSHOT
Sammi Gaffnye   2017 10:30 AM   Office Visit   MRN:  [id number]    Description:  Male : 1945   Department:  [facility]              Visit Summary      Primary Visit Diagnosis     Anemia in chronic kid aspirin 81 MG Oral Tab Take 81 mg by mouth daily. cetirizine (ZYRTEC) 10 MG Oral Tab Take 10 mg by mouth daily.     FREESTYLE LITE TEST In Vitro Strip     PATANOL 0.1 % Ophthalmic Solution     Albuterol Sulfate HFA (VENTOLIN HFA) 108 (90 BASE) MCG/ACT I

## (undated) NOTE — LETTER
[facility]  Administration of Anesthesia  IOllie agree to be cared for by a physician anesthesiologist alone and/or with a nurse anesthetist, who is specially trained to monitor me and give me medicine to put me to sleep or keep me comfortable during my procedure    I understand that my anesthesiologist and/or anesthetist is not an employee or agent of [facility] or [employer]. He or she works for [facility]    As the patient asking for anesthesia services, I agree to:  Allow the anesthesiologist (anesthesia doctor) to give me medicine and do additional procedures as necessary. Some examples are: Starting or using an “IV” to give me medicine, fluids or blood during my procedure, and having a breathing tube placed to help me breathe when I’m asleep (intubation). In the event that my heart stops working properly, I understand that my anesthesiologist will make every effort to sustain my life, unless otherwise directed by [facility] Do Not Resuscitate documents.  Tell my anesthesia doctor before my procedure:  If I am pregnant.  The last time that I ate or drank.  iii. All of the medicines I take (including prescriptions, herbal supplements, and pills I can buy without a prescription (including street drugs/illegal medications). Failure to inform my anesthesiologist about these medicines may increase my risk of anesthetic complications.  iv.If I am allergic to anything or have had a reaction to anesthesia before.  I understand how the anesthesia medicine will help me (benefits).  I understand that with any type of anesthesia medicine there are risks:  The most common risks are: nausea, vomiting, sore throat, muscle soreness, damage to my eyes, mouth, or teeth (from breathing tube placement).  Rare risks include: remembering what happened during my procedure, allergic reactions to medications, injury to my airway, heart, lungs, vision, nerves, or  muscles and in extremely rare instances death.  My doctor has explained to me other choices available to me for my care (alternatives).  Pregnant Patients (“epidural”):  I understand that the risks of having an epidural (medicine given into my back to help control pain during labor), include itching, low blood pressure, difficulty urinating, headache or slowing of the baby’s heart. Very rare risks include infection, bleeding, seizure, irregular heart rhythms and nerve injury.  Regional Anesthesia (“spinal”, “epidural”, & “nerve blocks”):  I understand that rare but potential complications include headache, bleeding, infection, seizure, irregular heart rhythms, and nerve injury.    _____________________________________________________________________________  Patient (or Representative) Signature/Relationship to Patient  Date   Time    _____________________________________________________________________________   Name (if used)    Language/Organization   Time    _____________________________________________________________________________  Nurse Anesthetist Signature     Date   Time  _____________________________________________________________________________  Anesthesiologist Signature     Date   Time  I have discussed the procedure and information above with the patient (or patient’s representative) and answered their questions. The patient or their representative has agreed to have anesthesia services.    _____________________________________________________________________________  Witness        Date   Time  I have verified that the signature is that of the patient or patient’s representative, and that it was signed before the procedure  Patient Name: Ollie Hernández     : 1945                 Printed: 6/15/2024 at 7:21 AM    Medical Record #: [id number]                                            Page 1 of 1  ----------ANESTHESIA CONSENT----------

## (undated) NOTE — LETTER
[place] [address]  Authorization for Invasive Procedures  Date: ***           Time: ***    {[facility] ivs consent:[id number]}

## (undated) NOTE — LETTER
[facility]  Administration of Anesthesia  IOllie agree to be cared for by a physician anesthesiologist alone and/or with a nurse anesthetist, who is specially trained to monitor me and give me medicine to put me to sleep or keep me comfortable during my procedure    I understand that my anesthesiologist and/or anesthetist is not an employee or agent of [facility] or [employer]. He or she works for [facility]    As the patient asking for anesthesia services, I agree to:  Allow the anesthesiologist (anesthesia doctor) to give me medicine and do additional procedures as necessary. Some examples are: Starting or using an “IV” to give me medicine, fluids or blood during my procedure, and having a breathing tube placed to help me breathe when I’m asleep (intubation). In the event that my heart stops working properly, I understand that my anesthesiologist will make every effort to sustain my life, unless otherwise directed by [facility] Do Not Resuscitate documents.  Tell my anesthesia doctor before my procedure:  If I am pregnant.  The last time that I ate or drank.  iii. All of the medicines I take (including prescriptions, herbal supplements, and pills I can buy without a prescription (including street drugs/illegal medications). Failure to inform my anesthesiologist about these medicines may increase my risk of anesthetic complications.  iv.If I am allergic to anything or have had a reaction to anesthesia before.  I understand how the anesthesia medicine will help me (benefits).  I understand that with any type of anesthesia medicine there are risks:  The most common risks are: nausea, vomiting, sore throat, muscle soreness, damage to my eyes, mouth, or teeth (from breathing tube placement).  Rare risks include: remembering what happened during my procedure, allergic reactions to medications, injury to my airway, heart, lungs, vision, nerves, or  muscles and in extremely rare instances death.  My doctor has explained to me other choices available to me for my care (alternatives).  Pregnant Patients (“epidural”):  I understand that the risks of having an epidural (medicine given into my back to help control pain during labor), include itching, low blood pressure, difficulty urinating, headache or slowing of the baby’s heart. Very rare risks include infection, bleeding, seizure, irregular heart rhythms and nerve injury.  Regional Anesthesia (“spinal”, “epidural”, & “nerve blocks”):  I understand that rare but potential complications include headache, bleeding, infection, seizure, irregular heart rhythms, and nerve injury.    _____________________________________________________________________________  Patient (or Representative) Signature/Relationship to Patient  Date   Time    _____________________________________________________________________________   Name (if used)    Language/Organization   Time    _____________________________________________________________________________  Nurse Anesthetist Signature     Date   Time  _____________________________________________________________________________  Anesthesiologist Signature     Date   Time  I have discussed the procedure and information above with the patient (or patient’s representative) and answered their questions. The patient or their representative has agreed to have anesthesia services.    _____________________________________________________________________________  Witness        Date   Time  I have verified that the signature is that of the patient or patient’s representative, and that it was signed before the procedure  Patient Name: Ollie Hernández     : 1945                 Printed: 10/3/2024 at 3:05 PM    Medical Record #: [id number]                                            Page 1 of 1  ----------ANESTHESIA CONSENT----------

## (undated) NOTE — LETTER
Hospital Discharge Documentation  Please phone to schedule a hospital follow up appointment.    From: [facility] Hospitalist's Office  Phone: [phone]    Patient discharged time/date: 2024  1:52 PM  Patient discharge disposition:  Home or Self Care       Discharge Summary - D/C Summary        Discharge Summary signed by Denis Julian MD at 2024  3:04 PM  Version 1 of 1      Author: Denis Julian MD Service: Hospitalist Author Type: Physician    Filed: 2024  3:04 PM Date of Service: 2024 11:50 AM Status: Signed    : Denis Julian MD (Physician)           [facility]  part of [facility]     DISCHARGE SUMMARY     Ollie Hernández Patient Status:  Inpatient    1945 MRN [id number]   Location [facility] 5SW/SE Attending Denis Julian MD   Hosp Day # 4 PCP Wili Parmar MD     DATE OF ADMISSION: 2024  DATE OF DISCHARGE:  24  DISPOSITION: home  CONDITION ON DISCHARGE: good    DISCHARGE DIAGNOSES:  Acute GI bleed  Anemia, acute blood loss  Atrial fibrillation, paroxysmal  End-stage renal disease on hemodialysis  Hypotension  Hypovolemia  Type 2 diabetes    HISTORY OF PRESENT ILLNESS (COPIED FROM ADMISSION H&P)  Patient is a 79-year-old  male with history of 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 Presented today to the emergency department for evaluation of maroonish-colored bowel movements for the last 3 to 4 days with generalized weakness and low blood pressure. He finished dialysis and noted to have systolic blood pressure of 70. In the emergency room, blood pressure 102/41. CBC showed hemoglobin of 5.3, which has dropped from baseline. INR 1.45. Chemistry still pending. Patient was initiated on blood transfusion. CT scan of the abdomen and pelvis still pending.     HOSPITAL COURSE:  Patient was admitted, transfused 3 units packed red blood cells.  Hemoglobin responded  appropriately.  There is no ongoing bleeding.  Seen by GI.  Due to recent multiple endoscopies, this was not repeated.  He did undergo capsule endoscopy which was negative.  Antiplatelets and anticoagulation were held at the time of admission.  Antiplatelets will be restarted due to his recent stent.  He will restart Eliquis in about 1 week if there is no further bleeding.  Hopefully patient will be able to get his Watchman placed and come off anticoagulation soon.    Patient understands return to the emergency room for increased pain, fever, discharge, shortness of breath, chest pain, new neurologic symptoms, other concerning symptoms.    PHYSICAL EXAM:  Temp:  [97.7 °F (36.5 °C)-98.5 °F (36.9 °C)] 97.7 °F (36.5 °C)  Pulse:  [67-77] 71  Resp:  [16-18] 18  BP: (115-179)/(44-61) 115/44  SpO2:  [95 %-100 %] 96 %  Gen: A+Ox3.  No distress.   HEENT: NCAT, neck supple, no carotid bruit.  CV: RRR, S1S2, and intact distal pulses. No gallop, rub, murmur.  Pulm: Effort and breath sounds normal. No distress, wheezes, rales, rhonchi.  Abd: Soft, NTND, BS normal, no mass, no HSM, no rebound/guarding.   Neuro: Normal reflexes, CN. Sensory/motor exams grossly normal deficit. Coordination  and gait normal.   MS: No joint effusions.  No peripheral edema.  Skin: Skin is warm and dry. No rashes, erythema, diaphoresis.   Psych: Normal mood and affect. Behavior and judgment normal.     DISCHARGE MEDICATIONS     Discharge Medications        ASK your doctor about these medications        Instructions Prescription details   acetaminophen 500 MG Tabs  Commonly known as: Tylenol Extra Strength      Take 1 tablet (500 mg total) by mouth daily as needed for Pain.   Refills: 0     albuterol 108 (90 Base) MCG/ACT Aers  Commonly known as: ProAir HFA      Inhale 2 puffs into the lungs every 4 (four) hours as needed for Wheezing.   Quantity: 3 each  Refills: 3     apixaban 5 MG Tabs  Commonly known as: Eliquis      Take 1 tablet (5 mg total) by  mouth 2 (two) times daily.   Refills: 0     aspirin 81 MG Tbec      Take 1 tablet (81 mg total) by mouth daily.   Quantity: 90 tablet  Refills: 3     atorvastatin 40 MG Tabs  Commonly known as: Lipitor      Take 1 tablet (40 mg total) by mouth nightly.   Quantity: 90 tablet  Refills: 3     Budesonide-Formoterol Fumarate 160-4.5 MCG/ACT Aero  Commonly known as: Symbicort      Inhale 2 puffs into the lungs 2 (two) times daily.   Quantity: 3 each  Refills: 3     carvedilol 25 MG Tabs  Commonly known as: Coreg      Take 1 tablet (25 mg total) by mouth 2 (two) times daily.   Refills: 0     cetirizine 10 MG Tabs  Commonly known as: ZyrTEC      Take 1 tablet (10 mg total) by mouth every other day.   Refills: 0     Cholecalciferol 125 MCG (5000 UT) Tabs  Commonly known as: VITAMIN D-3      Take 1 tablet (5,000 Units total) by mouth 2 (two) times daily.   Refills: 0     Contour Next Test Strp  Generic drug: Glucose Blood      Test 3 times daily   Quantity: 300 each  Refills: 1     Darbepoetin Randell 60 MCG/ML Soln      Inject into the vein as needed.   Refills: 0     escitalopram 5 MG Tabs  Commonly known as: Lexapro      Take 1 tablet (5 mg total) by mouth daily.   Quantity: 90 tablet  Refills: 3     felodipine ER 10 MG Tb24  Commonly known as: Plendil      Take 1 tablet (10 mg total) by mouth daily.   Quantity: 90 tablet  Refills: 3     fluticasone propionate 50 MCG/ACT Susp  Commonly known as: Flonase      2 sprays by Nasal route daily.   Quantity: 48 g  Refills: 3     methocarbamol 500 MG Tabs  Commonly known as: Robaxin      Take 1 tablet (500 mg total) by mouth 2 (two) times daily as needed.   Quantity: 60 tablet  Refills: 1     pantoprazole 40 MG Tbec  Commonly known as: Protonix      TAKE 1 TABLET BY MOUTH EVERY  MORNING BEFORE BREAKFAST   Quantity: 90 tablet  Refills: 3     polyethylene glycol (PEG 3350) 17 g Pack  Commonly known as: Miralax      17 g Wed, Thurs, Sat   Refills: 0     tetrahydrozoline 0.05 %  Soln  Commonly known as: Visine      1 drop 2 (two) times daily as needed.   Refills: 0     Tradjenta 5 MG Tabs  Generic drug: linaGLIPtin      Take 1 tablet (5 mg total) by mouth daily.   Quantity: 90 tablet  Refills: 1     traMADol 50 MG Tabs  Commonly known as: Ultram      Take 1 tablet (50 mg total) by mouth every 12 (twelve) hours as needed for Pain.   Refills: 0              CONSULTANTS  Consultants         Provider   Role Specialty     Emerson Julio MD      Consulting Physician Cardiovascular Diseases     Walker Klein MD      Consulting Physician NEPHROLOGY     Corky Bustamante MD      Consulting Physician GASTROENTEROLOGY     Amilcar Amaro MD      Consulting Physician HOSPITALIST            FOLLOW UP:  Corky Bustamante MD  [address]   [address]  [facility] [id number] [phone]    Follow up  As needed    Wili Parmar MD  [address] [place] [address] [id number] [phone]    Follow up in 1 week(s)  Post Discharge Followup    Raymond Smith MD  [address]  [address]  [facility] [id number] [phone]    Follow up  Post Discharge Followup    The above plan and follow-up instructions were reviewed with the patient and they verbalized understanding and agreement.  They understand to return to the emergency room for any concerning signs or symptoms.  Greater than 30 minutes spent on discharge.  -----------------------    Hospital Discharge Diagnoses: Acute GI bleed    Lace+ Score: 83  59-90 High Risk  29-58 Medium Risk  0-28   Low Risk.    TCM Follow-Up Recommendation:  LACE > 58: High Risk of readmission after discharge from the hospital.    Electronically signed by Denis Julian MD on 8/13/2024  3:04 PM

## (undated) NOTE — Clinical Note
Bernardino Sevilla, sharing patient's chart. CGM report on my notes. He'll start novolog per sliding scale.  130-150   1 unit 151-200   2 units 201-250   3 units 251-300   4 units > 301   5 units Gvoke pen was also sent to pharmacy. I'll call patient and wife on Friday. He has a f/u with you on 12/17/24.

## (undated) NOTE — LETTER
[facility]  [address], [place], [place]    Authorization for Surgical Operation and Procedure                               I hereby authorize Humberto Murphy MD, my physician and his/her assistants (if applicable), which may include medical students, residents, and/or fellows, to perform the following surgical operation/ procedure and administer such anesthesia as may be determined necessary by my physician: Operation/Procedure name (s) Left hip hemiarthroplasty or total hip arthroplasty, fluroscopy, possible open reduction internal fixation on Ollie Hernández   2.   I recognize that during the surgical operation/procedure, unforeseen conditions may necessitate additional or different procedures than those listed above.  I, therefore, further authorize and request that the above-named surgeon, assistants, or designees perform such procedures as are, in their judgment, necessary and desirable.    3.   My surgeon/physician has discussed prior to my surgery the potential benefits, risks and side effects of this procedure; the likelihood of achieving goals; and potential problems that might occur during recuperation.  They also discussed reasonable alternatives to the procedure, including risks, benefits, and side effects related to the alternatives and risks related to not receiving this procedure.  I have had all my questions answered and I acknowledge that no guarantee has been made as to the result that may be obtained.    4.   Should the need arise during my operation/procedure, which includes change of level of care prior to discharge, I also consent to the administration of blood and/or blood products.  Further, I understand that despite careful testing and screening of blood or blood products by collecting agencies, I may still be subject to ill effects as a result of receiving a blood transfusion and/or blood products.  The following are some, but not all, of the potential risks that  can occur: fever and allergic reactions, hemolytic reactions, transmission of diseases such as Hepatitis, AIDS and Cytomegalovirus (CMV) and fluid overload.  In the event that I wish to have an autologous transfusion of my own blood, or a directed donor transfusion, I will discuss this with my physician.  Check only if Refusing Blood or Blood Products  I understand refusal of blood or blood products as deemed necessary by my physician may have serious consequences to my condition to include possible death. I hereby assume responsibility for my refusal and release the hospital, its personnel, and my physicians from any responsibility for the consequences of my refusal.    o  Refuse   5.   I authorize the use of any specimen, organs, tissues, body parts or foreign objects that may be removed from my body during the operation/procedure for diagnosis, research or teaching purposes and their subsequent disposal by hospital authorities.  I also authorize the release of specimen test results and/or written reports to my treating physician on the hospital medical staff or other referring or consulting physicians involved in my care, at the discretion of the Pathologist or my treating physician.    6.   I consent to the photographing or videotaping of the operations or procedures to be performed, including appropriate portions of my body for medical, scientific, or educational purposes, provided my identity is not revealed by the pictures or by descriptive texts accompanying them.  If the procedure has been photographed/videotaped, the surgeon will obtain the original picture, image, videotape or CD.  The hospital will not be responsible for storage, release or maintenance of the picture, image, tape or CD.    7.   I consent to the presence of a  or observers in the operating room as deemed necessary by my physician or their designees.    8.   I recognize that in the event my procedure results in extended  X-Ray/fluoroscopy time, I may develop a skin reaction.    9. If I have a Do Not Attempt Resuscitation (DNAR) order in place, that status will be suspended while in the operating room, procedural suite, and during the recovery period unless otherwise explicitly stated by me (or a person authorized to consent on my behalf). The surgeon or my attending physician will determine when the applicable recovery period ends for purposes of reinstating the DNAR order.  10. Patients having a sterilization procedure: I understand that if the procedure is successful the results will be permanent and it will therefore be impossible for me to inseminate, conceive, or bear children.  I also understand that the procedure is intended to result in sterility, although the result has not been guaranteed.   11. I acknowledge that my physician has explained sedation/analgesia administration to me including the risk and benefits I consent to the administration of sedation/analgesia as may be necessary or desirable in the judgment of my physician.    I CERTIFY THAT I HAVE READ AND FULLY UNDERSTAND THE ABOVE CONSENT TO OPERATION and/or OTHER PROCEDURE.     ____________________________________  _________________________________        ______________________________  Signature of Patient    Signature of Responsible Person                Printed Name of Responsible Person                                      ____________________________________  _____________________________                ________________________________  Signature of Witness        Date  Time         Relationship to Patient    STATEMENT OF PHYSICIAN My signature below affirms that prior to the time of the procedure; I have explained to the patient and/or his/her legal representative, the risks and benefits involved in the proposed treatment and any reasonable alternative to the proposed treatment. I have also explained the risks and benefits involved in refusal of the proposed  treatment and alternatives to the proposed treatment and have answered the patient's questions. If I have a significant financial interest in a co-management agreement or a significant financial interest in any product or implant, or other significant relationship used in this procedure/surgery, I have disclosed this and had a discussion with my patient.     _____________________________________________________              _____________________________  (Signature of Physician)                                                                                         (Date)                                   (Time)  Patient Name: Ollie Hernández      : 1945      Printed: 10/3/2024     Medical Record #: [id number]                                      Page 1 of 1

## (undated) NOTE — LETTER
[place] [address]  Authorization for Invasive Procedures  Date: 07/30/24           Time: 1749    I hereby authorize Dr. KARSON Saldana, my physician and his/her assistants (if applicable), which may include medical students, residents, and/or fellows, to perform the following surgical operation/ procedure and administer such anesthesia as may be determined necessary by my physician: esophagogastroduodenoscopy and colonoscopy with possibly biopsy, banding or control of bleeding.  on Ollie Hernández  2.   I recognize that during the surgical operation/procedure, unforeseen conditions may necessitate additional or different procedures than those listed above.  I, therefore, further authorize and request that the above-named surgeon, assistants, or designees perform such procedures as are, in their judgment, necessary and desirable.    3.   My surgeon/physician has discussed prior to my surgery the potential benefits, risks and side effects of this procedure; the likelihood of achieving goals; and potential problems that might occur during recuperation.  They also discussed reasonable alternatives to the procedure, including risks, benefits, and side effects related to the alternatives and risks related to not receiving this procedure.  I have had all my questions answered and I acknowledge that no guarantee has been made as to the result that may be obtained.    4.   Should the need arise during my operation/procedure, which includes change of level of care prior to discharge, I also consent to the administration of blood and/or blood products.  Further, I understand that despite careful testing and screening of blood or blood products by collecting agencies, I may still be subject to ill effects as a result of receiving a blood transfusion and/or blood products.  The following are some, but not all, of the potential risks that can occur: fever and allergic reactions, hemolytic reactions,  transmission of diseases such as Hepatitis, AIDS and Cytomegalovirus (CMV) and fluid overload.  In the event that I wish to have an autologous transfusion of my own blood, or a directed donor transfusion, I will discuss this with my physician.   Check only if Refusing Blood or Blood Products  I understand refusal of blood or blood products as deemed necessary by my physician may have serious consequences to my condition to include possible death. I hereby assume responsibility for my refusal and release the hospital, its personnel, and my physicians from any responsibility for the consequences of my refusal.         o  Refuse         5.   I authorize the use of any specimen, organs, tissues, body parts or foreign objects that may be removed from my body during the operation/procedure for diagnosis, research or teaching purposes and their subsequent disposal by hospital authorities.  I also authorize the release of specimen test results and/or written reports to my treating physician on the hospital medical staff or other referring or consulting physicians involved in my care, at the discretion of the Pathologist or my treating physician.    6.   I consent to the photographing or videotaping of the operations or procedures to be performed, including appropriate portions of my body for medical, scientific, or educational purposes, provided my identity is not revealed by the pictures or by descriptive texts accompanying them.  If the procedure has been photographed/videotaped, the surgeon will obtain the original picture, image, videotape or CD.  The hospital will not be responsible for storage, release or maintenance of the picture, image, tape or CD.    7.   I consent to the presence of a  or observers in the operating room as deemed necessary by my physician or their designees.    8.   I recognize that in the event my procedure results in extended X-Ray/fluoroscopy time, I may develop a skin  reaction.    9. If I have a Do Not Attempt Resuscitation (DNAR) order in place, that status will be suspended while in the operating room, procedural suite, and during the recovery period unless otherwise explicitly stated by me (or a person authorized to consent on my behalf). The surgeon or my attending physician will determine when the applicable recovery period ends for purposes of reinstating the DNAR order.  10. Patients having a sterilization procedure: I understand that if the procedure is successful the results will be permanent and it will therefore be impossible for me to inseminate, conceive, or bear children.  I also understand that the procedure is intended to result in sterility, although the result has not been guaranteed.   11. I acknowledge that my physician has explained sedation/analgesia administration to me including the risk and benefits I consent to the administration of sedation/analgesia as may be necessary or desirable in the judgment of my physician.    I CERTIFY THAT I HAVE READ AND FULLY UNDERSTAND THE ABOVE CONSENT TO OPERATION and/or OTHER PROCEDURE.        ____________________________________       _________________________________      ______________________________  Signature of Patient         Signature of Responsible Person        Printed Name of Responsible Person        ____________________________________      _________________________________      ______________________________       Signature of Witness          Relationship to Patient                       Date                                       Time  Patient Name: Ollie Hernández  : 1945    Reviewed: 2024   Printed: 2024  Medical Record #: [id number] Page 1 of 2             STATEMENT OF PHYSICIAN My signature below affirms that prior to the time of the procedure; I have explained to the patient and/or his/her legal representative, the risks and benefits involved in the proposed treatment and any  reasonable alternative to the proposed treatment. I have also explained the risks and benefits involved in refusal of the proposed treatment and alternatives to the proposed treatment and have answered the patient's questions. If I have a significant financial interest in a co-management agreement or a significant financial interest in any product or implant, or other significant relationship used in this procedure/surgery, I have disclosed this and had a discussion with my patient.     _______________________________________________________________ _____________________________  (Signature of Physician)                                                                                         (Date)                                   (Time)  Patient Name: Ollie Hernández  : 1945    Reviewed: 2024   Printed: 2024  Medical Record #: [id number] Page 2 of 2

## (undated) NOTE — ED AVS SNAPSHOT
[place]   MRN: [id number]    Department:  [facility] Emergency Department   Date of Visit:  7/20/2019           Disclosure     Insurance plans vary and the physician(s) referred by the ER may not be covered by your plan.  Please contac within the next three months to obtain basic health screening including reassessment of your blood pressure.     IF THERE IS ANY CHANGE OR WORSENING OF YOUR CONDITION, CALL YOUR PRIMARY CARE PHYSICIAN AT ONCE OR RETURN IMMEDIATELY TO THE EMERGENCY DEPARTMEN

## (undated) NOTE — ED AVS SNAPSHOT
Kodi Lyon   MRN: [id number]    Department:  [facility] Emergency Department   Date of Visit:  7/25/2019           Disclosure     Insurance plans vary and the physician(s) referred by the ER may not be covered by your plan.  Please contac within the next three months to obtain basic health screening including reassessment of your blood pressure.     IF THERE IS ANY CHANGE OR WORSENING OF YOUR CONDITION, CALL YOUR PRIMARY CARE PHYSICIAN AT ONCE OR RETURN IMMEDIATELY TO THE EMERGENCY DEPARTMEN

## (undated) NOTE — MR AVS SNAPSHOT
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

## (undated) NOTE — LETTER
November 26, 2024    Ollie Hernández  [address]  [facility] [address]      Dear Ollie:  It was a pleasure speaking with you over the phone recently. I wanted to send you my contact information for you to utilize when you have a question and or need some assistance. Dr Parmar and myself are excited that you have decided to give our Chronic Care Management program a try.  I am available to provide you with the support and education needed to keep you healthy.   Together we will work on:  Coordination of care: helping to reduce duplicate orders/tests to save you time and money  Medications: review, educate, and discuss any concerns or issues you may have  Personalized education and support regarding your health.  These services, among others, will help you take control of your health and provide you with optimal quality care. Although not required, I have attached a written consent form purely for your review. There is no action needed from you. This form outlines the information we had talked about over the phone. If you have any questions please feel free to contact myself and or your insurance company for more details.   I look forward to working with you,  Britany Loyola ALONDRA [facility]  Britany Loyola  Health  Care Managment Population Health  [phone] work  [email or web address]    Friendly reminder - 2025 Benefits Open Enrollment is here!   We encourage you to review your current Medicare coverage and explore your options during this period. We have developed a Medicare Information Page on our website to serve as a resource for guidance and answers to any questions you might have.   You can access it by visiting, [email or web address]

## (undated) NOTE — LETTER
[facility] 4W/SW/SE  [address]  [facility] [address]  [phone]    Blood Transfusion Consent    In the course of your treatment, it may become necessary to administer a transfusion of blood or blood components. This form provides basic information concerning this procedure and, if signed by you, authorizes its administration. By signing this form, you agree that all of your questions about the administration of blood or blood products have been answered by the ordering medical professional or designee.    Description of Procedure  Blood is introduced into one of your veins, commonly in the arm, using a sterilized disposable needle. The amount of blood transfused, and whether the transfusion will be of blood or blood components is a judgement the physician will make based on your particular needs.    Risks  The transfusion is a common procedure of low risk.  MINOR AND TEMPORARY REACTIONS ARE NOT UNCOMMON, including a slight bruise, swelling or local reaction in the area where the needle pierces your skin, or a nonserious reaction to the transfused material itself, including headache, fever or mild skin reaction, such as rash.  Serious reactions are possible, though very unlikely, and include severe allergic reaction (shock) and destruction (hemolysis) of transfused blood cells.  Infectious diseases which are known to be transmitted by blood transfusion include certain types of viral Hepatitis(liver infection from a virus), Human Immunodeficiency Virus (HIV-1,2) infection, a viral infection known to cause Acquired Immunodeficiency Syndrome (AIDS), as well as certain other bacterial, viral, and parasitic diseases. While a minimal risk of acquiring an infectious disease from transfused blood exists, in accordance with the Federal and State law, all due care has been taken in donor selection and testing to avoid transmission of disease.    Alternatives  If loss of blood poses serious threats during your  treatment, THERE IS NO EFFECTIVE ALTERNATIVE TO BLOOD TRANSFUSION. However, if you have any further questions on this matter, your provider will fully explain the alternatives to you if it has not already been done.    I, ______________________________, have read/had read to me the above. I understand the matters bearing on the decision whether or not to authorize a transfusion of blood or blood components. I have no questions which have not been answered to my full satisfaction. I hereby consent to such transfusion as my physician may deem necessary or advisable in the course of my treatment.    ______________________________________________                    ___________________________  (Signature of Patient or Responsible party in case of minor,                 (Printed Name of Patient or incompetent, or unconscious patient)              Responsible Party)    ___________________________               _____________________  (Relationship to Patient if not self)                                    (Date and Time)    __________________________                                                           ______________________              (Signature of Witness)               (Printed Name of Witness)     Language line ()    Telephone/Verbal/Video Consent    __________________________                     ____________________  (Signature of 2nd Witness           (Printed Name of 2nd  Telephone/Verbal/Video Consent)           Witness)    Patient Name: Ollie Hernández     : 1945                 Printed: 2024     Medical Record #: [id number]      Rev: 2023

## (undated) NOTE — MR AVS SNAPSHOT
Viki Monk   2017 10:30 AM   Office Visit   MRN:  [id number]    Description:  Male : 1945   Department:  [address] [place] [address] - [facility]              Visit Summary      Primary Visit Diagnosis     Anemia in chronic ki

## (undated) NOTE — MR AVS SNAPSHOT
Emilie Nicole   2017 9:00 AM   Nurse Only   MRN:  [id number]    Description:  Male : 1945   Department:  [address] [place] [address] - [facility]              Visit Summary      Primary Visit Diagnosis     Chronic kidney disease, FREESTYLE LANCETS Does not apply Misc Test tid    insulin detemir 100 UNIT/ML Subcutaneous Solution Pen-injector Inject 40 Units into the skin nightly. aspirin 81 MG Oral Tab Take 81 mg by mouth daily.     cetirizine (ZYRTEC) 10 MG Oral Tab Take 10 mg b

## (undated) NOTE — LETTER
[facility]  Administration of Anesthesia  IOllie agree to be cared for by a physician anesthesiologist alone and/or with a nurse anesthetist, who is specially trained to monitor me and give me medicine to put me to sleep or keep me comfortable during my procedure    I understand that my anesthesiologist and/or anesthetist is not an employee or agent of [facility] or [employer]. He or she works for [facility]    As the patient asking for anesthesia services, I agree to:  Allow the anesthesiologist (anesthesia doctor) to give me medicine and do additional procedures as necessary. Some examples are: Starting or using an “IV” to give me medicine, fluids or blood during my procedure, and having a breathing tube placed to help me breathe when I’m asleep (intubation). In the event that my heart stops working properly, I understand that my anesthesiologist will make every effort to sustain my life, unless otherwise directed by [facility] Do Not Resuscitate documents.  Tell my anesthesia doctor before my procedure:  If I am pregnant.  The last time that I ate or drank.  iii. All of the medicines I take (including prescriptions, herbal supplements, and pills I can buy without a prescription (including street drugs/illegal medications). Failure to inform my anesthesiologist about these medicines may increase my risk of anesthetic complications.  iv.If I am allergic to anything or have had a reaction to anesthesia before.  I understand how the anesthesia medicine will help me (benefits).  I understand that with any type of anesthesia medicine there are risks:  The most common risks are: nausea, vomiting, sore throat, muscle soreness, damage to my eyes, mouth, or teeth (from breathing tube placement).  Rare risks include: remembering what happened during my procedure, allergic reactions to medications, injury to my airway, heart, lungs, vision, nerves, or  muscles and in extremely rare instances death.  My doctor has explained to me other choices available to me for my care (alternatives).  Pregnant Patients (“epidural”):  I understand that the risks of having an epidural (medicine given into my back to help control pain during labor), include itching, low blood pressure, difficulty urinating, headache or slowing of the baby’s heart. Very rare risks include infection, bleeding, seizure, irregular heart rhythms and nerve injury.  Regional Anesthesia (“spinal”, “epidural”, & “nerve blocks”):  I understand that rare but potential complications include headache, bleeding, infection, seizure, irregular heart rhythms, and nerve injury.    _____________________________________________________________________________  Patient (or Representative) Signature/Relationship to Patient  Date   Time    _____________________________________________________________________________   Name (if used)    Language/Organization   Time    _____________________________________________________________________________  Nurse Anesthetist Signature     Date   Time  _____________________________________________________________________________  Anesthesiologist Signature     Date   Time  I have discussed the procedure and information above with the patient (or patient’s representative) and answered their questions. The patient or their representative has agreed to have anesthesia services.    _____________________________________________________________________________  Witness        Date   Time  I have verified that the signature is that of the patient or patient’s representative, and that it was signed before the procedure  Patient Name: Ollie Hernández     : 1945                 Printed: 10/18/2024 at 5:42 PM    Medical Record #: [id number]                                            Page 1 of 1  ----------ANESTHESIA CONSENT----------

## (undated) NOTE — LETTER
[place] [address]  Authorization for Invasive Procedures  Date: 12/17/2024           Time: 12:00    I hereby authorize Bhavna Muller, my physician and his/her assistants (if applicable), which may include medical students, residents, and/or fellows, to perform the following surgical operation/ procedure and administer such anesthesia as may be determined necessary by my physician: L5/S1 Lumbar epidural steroid injection with xray  on Ollie Hernández  2.   I recognize that during the surgical operation/procedure, unforeseen conditions may necessitate additional or different procedures than those listed above.  I, therefore, further authorize and request that the above-named surgeon, assistants, or designees perform such procedures as are, in their judgment, necessary and desirable.    3.   My surgeon/physician has discussed prior to my surgery the potential benefits, risks and side effects of this procedure; the likelihood of achieving goals; and potential problems that might occur during recuperation.  They also discussed reasonable alternatives to the procedure, including risks, benefits, and side effects related to the alternatives and risks related to not receiving this procedure.  I have had all my questions answered and I acknowledge that no guarantee has been made as to the result that may be obtained.    4.   Should the need arise during my operation/procedure, which includes change of level of care prior to discharge, I also consent to the administration of blood and/or blood products.  Further, I understand that despite careful testing and screening of blood or blood products by collecting agencies, I may still be subject to ill effects as a result of receiving a blood transfusion and/or blood products.  The following are some, but not all, of the potential risks that can occur: fever and allergic reactions, hemolytic reactions, transmission of diseases such as Hepatitis, AIDS  and Cytomegalovirus (CMV) and fluid overload.  In the event that I wish to have an autologous transfusion of my own blood, or a directed donor transfusion, I will discuss this with my physician.   Check only if Refusing Blood or Blood Products  I understand refusal of blood or blood products as deemed necessary by my physician may have serious consequences to my condition to include possible death. I hereby assume responsibility for my refusal and release the hospital, its personnel, and my physicians from any responsibility for the consequences of my refusal.         o  Refuse         5.   I authorize the use of any specimen, organs, tissues, body parts or foreign objects that may be removed from my body during the operation/procedure for diagnosis, research or teaching purposes and their subsequent disposal by hospital authorities.  I also authorize the release of specimen test results and/or written reports to my treating physician on the hospital medical staff or other referring or consulting physicians involved in my care, at the discretion of the Pathologist or my treating physician.    6.   I consent to the photographing or videotaping of the operations or procedures to be performed, including appropriate portions of my body for medical, scientific, or educational purposes, provided my identity is not revealed by the pictures or by descriptive texts accompanying them.  If the procedure has been photographed/videotaped, the surgeon will obtain the original picture, image, videotape or CD.  The hospital will not be responsible for storage, release or maintenance of the picture, image, tape or CD.    7.   I consent to the presence of a  or observers in the operating room as deemed necessary by my physician or their designees.    8.   I recognize that in the event my procedure results in extended X-Ray/fluoroscopy time, I may develop a skin reaction.    9. If I have a Do Not Attempt  Resuscitation (DNAR) order in place, that status will be suspended while in the operating room, procedural suite, and during the recovery period unless otherwise explicitly stated by me (or a person authorized to consent on my behalf). The surgeon or my attending physician will determine when the applicable recovery period ends for purposes of reinstating the DNAR order.  10. Patients having a sterilization procedure: I understand that if the procedure is successful the results will be permanent and it will therefore be impossible for me to inseminate, conceive, or bear children.  I also understand that the procedure is intended to result in sterility, although the result has not been guaranteed.   11. I acknowledge that my physician has explained sedation/analgesia administration to me including the risk and benefits I consent to the administration of sedation/analgesia as may be necessary or desirable in the judgment of my physician.    I CERTIFY THAT I HAVE READ AND FULLY UNDERSTAND THE ABOVE CONSENT TO OPERATION and/or OTHER PROCEDURE.        ____________________________________       _________________________________      ______________________________  Signature of Patient         Signature of Responsible Person        Printed Name of Responsible Person        ____________________________________      _________________________________      ______________________________       Signature of Witness          Relationship to Patient                       Date                                       Time  Patient Name: Ollie Hernández  : 1945    Reviewed: 2024   Printed: 2024  Medical Record #: [id number] Page 1 of 2             STATEMENT OF PHYSICIAN My signature below affirms that prior to the time of the procedure; I have explained to the patient and/or his/her legal representative, the risks and benefits involved in the proposed treatment and any reasonable alternative to the proposed  treatment. I have also explained the risks and benefits involved in refusal of the proposed treatment and alternatives to the proposed treatment and have answered the patient's questions. If I have a significant financial interest in a co-management agreement or a significant financial interest in any product or implant, or other significant relationship used in this procedure/surgery, I have disclosed this and had a discussion with my patient.     _______________________________________________________________ _____________________________  (Signature of Physician)                                                                                         (Date)                                   (Time)  Patient Name: Ollie Hernández  : 1945    Reviewed: 2024   Printed: 2024  Medical Record #: [id number] Page 2 of 2

## (undated) NOTE — LETTER
[facility] POPULATION HEALTH MANAGEMENT DEPARTMENT  [address] [address]  PH: [phone]  FAX: [phone]        24  Ollie Hernádnez, :  1945  [address] [facility] [address]

## (undated) NOTE — LETTER
[facility]  [address], [place], [place]    Authorization for Surgical Operation and Procedure                               I hereby authorize Carlyn Storey MD, my physician and his/her assistants (if applicable), which may include medical students, residents, and/or fellows, to perform the following surgical operation/ procedure and administer such anesthesia as may be determined necessary by my physician: Operation/Procedure name (s) COLONOSCOPY on Ollie Hernández   2.   I recognize that during the surgical operation/procedure, unforeseen conditions may necessitate additional or different procedures than those listed above.  I, therefore, further authorize and request that the above-named surgeon, assistants, or designees perform such procedures as are, in their judgment, necessary and desirable.    3.   My surgeon/physician has discussed prior to my surgery the potential benefits, risks and side effects of this procedure; the likelihood of achieving goals; and potential problems that might occur during recuperation.  They also discussed reasonable alternatives to the procedure, including risks, benefits, and side effects related to the alternatives and risks related to not receiving this procedure.  I have had all my questions answered and I acknowledge that no guarantee has been made as to the result that may be obtained.    4.   Should the need arise during my operation/procedure, which includes change of level of care prior to discharge, I also consent to the administration of blood and/or blood products.  Further, I understand that despite careful testing and screening of blood or blood products by collecting agencies, I may still be subject to ill effects as a result of receiving a blood transfusion and/or blood products.  The following are some, but not all, of the potential risks that can occur: fever and allergic reactions, hemolytic reactions, transmission of diseases such as  Hepatitis, AIDS and Cytomegalovirus (CMV) and fluid overload.  In the event that I wish to have an autologous transfusion of my own blood, or a directed donor transfusion, I will discuss this with my physician.  Check only if Refusing Blood or Blood Products  I understand refusal of blood or blood products as deemed necessary by my physician may have serious consequences to my condition to include possible death. I hereby assume responsibility for my refusal and release the hospital, its personnel, and my physicians from any responsibility for the consequences of my refusal.    o  Refuse   5.   I authorize the use of any specimen, organs, tissues, body parts or foreign objects that may be removed from my body during the operation/procedure for diagnosis, research or teaching purposes and their subsequent disposal by hospital authorities.  I also authorize the release of specimen test results and/or written reports to my treating physician on the hospital medical staff or other referring or consulting physicians involved in my care, at the discretion of the Pathologist or my treating physician.    6.   I consent to the photographing or videotaping of the operations or procedures to be performed, including appropriate portions of my body for medical, scientific, or educational purposes, provided my identity is not revealed by the pictures or by descriptive texts accompanying them.  If the procedure has been photographed/videotaped, the surgeon will obtain the original picture, image, videotape or CD.  The hospital will not be responsible for storage, release or maintenance of the picture, image, tape or CD.    7.   I consent to the presence of a  or observers in the operating room as deemed necessary by my physician or their designees.    8.   I recognize that in the event my procedure results in extended X-Ray/fluoroscopy time, I may develop a skin reaction.    9. If I have a Do Not Attempt  Resuscitation (DNAR) order in place, that status will be suspended while in the operating room, procedural suite, and during the recovery period unless otherwise explicitly stated by me (or a person authorized to consent on my behalf). The surgeon or my attending physician will determine when the applicable recovery period ends for purposes of reinstating the DNAR order.  10. Patients having a sterilization procedure: I understand that if the procedure is successful the results will be permanent and it will therefore be impossible for me to inseminate, conceive, or bear children.  I also understand that the procedure is intended to result in sterility, although the result has not been guaranteed.   11. I acknowledge that my physician has explained sedation/analgesia administration to me including the risk and benefits I consent to the administration of sedation/analgesia as may be necessary or desirable in the judgment of my physician.    I CERTIFY THAT I HAVE READ AND FULLY UNDERSTAND THE ABOVE CONSENT TO OPERATION and/or OTHER PROCEDURE.     ____________________________________  _________________________________        ______________________________  Signature of Patient    Signature of Responsible Person                Printed Name of Responsible Person                                      ____________________________________  _____________________________                ________________________________  Signature of Witness        Date  Time         Relationship to Patient    STATEMENT OF PHYSICIAN My signature below affirms that prior to the time of the procedure; I have explained to the patient and/or his/her legal representative, the risks and benefits involved in the proposed treatment and any reasonable alternative to the proposed treatment. I have also explained the risks and benefits involved in refusal of the proposed treatment and alternatives to the proposed treatment and have answered the patient's  questions. If I have a significant financial interest in a co-management agreement or a significant financial interest in any product or implant, or other significant relationship used in this procedure/surgery, I have disclosed this and had a discussion with my patient.     _____________________________________________________              _____________________________  (Signature of Physician)                                                                                         (Date)                                   (Time)  Patient Name: Ollie Hernández      : 1945      Printed: 6/15/2024     Medical Record #: [id number]                                      Page 1 of 1

## (undated) NOTE — MR AVS SNAPSHOT
Donna Galloway   2017 9:00 AM   Nurse Only   MRN:  [id number]    Description:  Male : 1945   Department:  [address] [place] [address] - [facility]              Visit Summary      Primary Visit Diagnosis     Chronic kidney disease,

## (undated) NOTE — LETTER
[facility] 5SW/SE  [address]  [facility] [address]  [phone]    Blood Transfusion Consent    In the course of your treatment, it may become necessary to administer a transfusion of blood or blood components. This form provides basic information concerning this procedure and, if signed by you, authorizes its administration. By signing this form, you agree that all of your questions about the administration of blood or blood products have been answered by the ordering medical professional or designee.    Description of Procedure  Blood is introduced into one of your veins, commonly in the arm, using a sterilized disposable needle. The amount of blood transfused, and whether the transfusion will be of blood or blood components is a judgement the physician will make based on your particular needs.    Risks  The transfusion is a common procedure of low risk.  MINOR AND TEMPORARY REACTIONS ARE NOT UNCOMMON, including a slight bruise, swelling or local reaction in the area where the needle pierces your skin, or a nonserious reaction to the transfused material itself, including headache, fever or mild skin reaction, such as rash.  Serious reactions are possible, though very unlikely, and include severe allergic reaction (shock) and destruction (hemolysis) of transfused blood cells.  Infectious diseases which are known to be transmitted by blood transfusion include certain types of viral Hepatitis(liver infection from a virus), Human Immunodeficiency Virus (HIV-1,2) infection, a viral infection known to cause Acquired Immunodeficiency Syndrome (AIDS), as well as certain other bacterial, viral, and parasitic diseases. While a minimal risk of acquiring an infectious disease from transfused blood exists, in accordance with the Federal and State law, all due care has been taken in donor selection and testing to avoid transmission of disease.    Alternatives  If loss of blood poses serious threats during your  treatment, THERE IS NO EFFECTIVE ALTERNATIVE TO BLOOD TRANSFUSION. However, if you have any further questions on this matter, your provider will fully explain the alternatives to you if it has not already been done.    I, ______________________________, have read/had read to me the above. I understand the matters bearing on the decision whether or not to authorize a transfusion of blood or blood components. I have no questions which have not been answered to my full satisfaction. I hereby consent to such transfusion as my physician may deem necessary or advisable in the course of my treatment.    ______________________________________________                    ___________________________  (Signature of Patient or Responsible party in case of minor,                 (Printed Name of Patient or incompetent, or unconscious patient)              Responsible Party)    ___________________________               _____________________  (Relationship to Patient if not self)                                    (Date and Time)    __________________________                                                           ______________________              (Signature of Witness)               (Printed Name of Witness)     Language line ()    Telephone/Verbal/Video Consent    __________________________                     ____________________  (Signature of 2nd Witness           (Printed Name of 2nd  Telephone/Verbal/Video Consent)           Witness)    Patient Name: Ollie Hernández     : 1945                 Printed: December 10, 2024     Medical Record #: [id number]      Rev: 2023

## (undated) NOTE — Clinical Note
Transitional Care Management call completed. A Transitional Care Management appointment is scheduled for 5/28/2024. The patient declined full Rx review with the nurse care manager. Thank you.

## (undated) NOTE — LETTER
[facility]  Administration of Anesthesia  IOllie agree to be cared for by a physician anesthesiologist alone and/or with a nurse anesthetist, who is specially trained to monitor me and give me medicine to put me to sleep or keep me comfortable during my procedure    I understand that my anesthesiologist and/or anesthetist is not an employee or agent of [facility] or [employer]. He or she works for [facility]    As the patient asking for anesthesia services, I agree to:  Allow the anesthesiologist (anesthesia doctor) to give me medicine and do additional procedures as necessary. Some examples are: Starting or using an “IV” to give me medicine, fluids or blood during my procedure, and having a breathing tube placed to help me breathe when I’m asleep (intubation). In the event that my heart stops working properly, I understand that my anesthesiologist will make every effort to sustain my life, unless otherwise directed by [facility] Do Not Resuscitate documents.  Tell my anesthesia doctor before my procedure:  If I am pregnant.  The last time that I ate or drank.  iii. All of the medicines I take (including prescriptions, herbal supplements, and pills I can buy without a prescription (including street drugs/illegal medications). Failure to inform my anesthesiologist about these medicines may increase my risk of anesthetic complications.  iv.If I am allergic to anything or have had a reaction to anesthesia before.  I understand how the anesthesia medicine will help me (benefits).  I understand that with any type of anesthesia medicine there are risks:  The most common risks are: nausea, vomiting, sore throat, muscle soreness, damage to my eyes, mouth, or teeth (from breathing tube placement).  Rare risks include: remembering what happened during my procedure, allergic reactions to medications, injury to my airway, heart, lungs, vision, nerves, or  muscles and in extremely rare instances death.  My doctor has explained to me other choices available to me for my care (alternatives).  Pregnant Patients (“epidural”):  I understand that the risks of having an epidural (medicine given into my back to help control pain during labor), include itching, low blood pressure, difficulty urinating, headache or slowing of the baby’s heart. Very rare risks include infection, bleeding, seizure, irregular heart rhythms and nerve injury.  Regional Anesthesia (“spinal”, “epidural”, & “nerve blocks”):  I understand that rare but potential complications include headache, bleeding, infection, seizure, irregular heart rhythms, and nerve injury.    _____________________________________________________________________________  Patient (or Representative) Signature/Relationship to Patient  Date   Time    _____________________________________________________________________________   Name (if used)    Language/Organization   Time    _____________________________________________________________________________  Nurse Anesthetist Signature     Date   Time  _____________________________________________________________________________  Anesthesiologist Signature     Date   Time  I have discussed the procedure and information above with the patient (or patient’s representative) and answered their questions. The patient or their representative has agreed to have anesthesia services.    _____________________________________________________________________________  Witness        Date   Time  I have verified that the signature is that of the patient or patient’s representative, and that it was signed before the procedure  Patient Name: Ollie Hernández     : 1945                 Printed: 2024 at 7:38 AM    Medical Record #: [id number]                                            Page 1 of 1  ----------ANESTHESIA CONSENT----------

## (undated) NOTE — ED AVS SNAPSHOT
Rk Deluca   MRN: [id number]    Department:  [facility] Emergency Department   Date of Visit:  10/2/2017           Disclosure     Insurance plans vary and the physician(s) referred by the ER may not be covered by your plan.  Please contac CARE PHYSICIAN AT ONCE OR RETURN IMMEDIATELY TO THE EMERGENCY DEPARTMENT. If you have been prescribed any medication(s), please fill your prescription right away and begin taking the medication(s) as directed.   If you believe that any of the medications

## (undated) NOTE — LETTER
Hospital Discharge Documentation    From: [facility] Hospitalist's Office  Phone: [phone]    Patient discharged time/date: 12/23/2024  1:25 PM  Patient discharge disposition:  [facility] [place] [phone]        Discharge Summary - D/C Summary        Discharge Summary signed by Dee Joyce MD at 12/24/2024  9:43 AM   Version 1 of 1      Author: Dee Joyce MD Service: Hospitalist Author Type: Physician    Filed: 12/24/2024  9:43 AM Status: Signed    : Dee Joyce MD (Physician)         DISCHARGE SUMMARY    MELISSA ISRAEL [id number]   YOB: 1945 [id number]         [facility]    PATIENT'S NAME: MELISSA ISRAEL   ATTENDING PHYSICIAN: Dee Joyce MD   PATIENT ACCOUNT#:   [id number]    LOCATION:  [address] [place] [address]  MEDICAL RECORD #:   [id number]       YOB: 1945  ADMISSION DATE:       12/06/2024      DISCHARGE DATE:  12/23/2024    DISCHARGE SUMMARY    About 45 minutes were spent preparing this discharge, counseling his wife, coordinating care with Dr. Phelan and otherwise preparing discharge.     HISTORY AND HOSPITAL COURSE:  This is a 79-year-old -American male who presented to the hospital with a history of bilateral leg pain.  He is diabetic and seems to have had neuropathy for some time and nothing seems to control the neuropathy.  We tried various medications and he just became confused on narcotics.  He had a history of coronary artery disease and also renal failure so Renal was consulted to manage his dialysis.  We called the pain service as the patient's pain was very difficult to control.  I ended up doing an MRI which showed multilevel disc disease and the patient had a Watchman device so he could not be anticoagulated and Cardiology cleared him to have the MRI.      The patient had to have Plavix held for many days for the steroid injection and, after the steroid injection, the  pain did not seem to subside for a few days but he slowly improved to the point that I thought that I could discharge him on 12/20.  However, he had episodes of hypotension and rapid response atrial fibrillation.  Cardiology came back to adjust the medications and, today, he had dialysis without incident and I sent him to rehab.      PHYSICAL EXAMINATION ON DISCHARGE:    VITAL SIGNS:  Temperature is 97.6, pulse 69, respiratory rate 18, blood pressure 138/59, 94%.  LUNGS:  Occasional rhonchi.  HEART:  Normal S1, S2.  No S3.  ABDOMEN:  Soft.    EXTREMITIES:  Without calf tenderness.  NEUROLOGIC:  He is alert and oriented, friendly and cooperative.    LABORATORY STUDIES:  Please see chart.      ASSESSMENT AND PLAN:    1.   Intractable neuropathy and sciatica-like pain, now better.  Continue pain medications.  2.   End-stage renal disease.  Dialysis Monday, Wednesday, and Friday.  3.   Acute respiratory failure with confusion, possible aspiration.  Completed antibiotics.  Doing well.  Weaned down to 1L but cannot seem to wean off.  Will discharge on oxygen.  4.   Anemia of chronic kidney disease, and iron deficiency.  Patient was transfused during this hospitalization.  5.   Atrial fibrillation rapid response.  Has a Watchman.  No anticoagulation.  Lopressor increased to control rates.  6.   Type 2 diabetes.    7.   Obstructive sleep apnea.  Near obesity.  BMI 29.44.  Continue treatment.  8.   Hypertension.    9.   Chronic diastolic congestive heart failure.  10.   Pulmonary hypertension.  11.   Abdominal bloating from medications.      CONDITION ON DISCHARGE:  Stable.    CODE STATUS:  Full Code.    DIET:  Tube feedings as ordered here.  He does not take oral feedings at all.     ACTIVITIES:  PT and OT; otherwise, as tolerated.    FOLLOWUP:    1.   Dr. Parmar after rehab discharge.  2.   Dr. Julio in 2 weeks and for all amiodarone dosing and renewal, etc.    3.   Dr. Pugh in 2 weeks and for dialysis.  4.     Bhavna in 2 weeks.     5.   PT and OT.  CBC, BMP, magnesium, phosphorus on Thursday to be called to Rehab MD.  Dr. Parmar or his designate to see in 1 to 2 days.    DISCHARGE MEDICATIONS:    1.   Albuterol nebulizer every 4 hours as needed for wheezing.  2.   ProAir 2 puffs every 4 hours as needed for more mild wheezing.  3.   Multivitamin once a day.    4.   Renal multivitamin.  5.   Symbicort 160/4.5 two puffs twice a day.  Rinse mouth after use.  6.   Cetirizine 10 mg every other day.  7.   Tylenol 500 mg in the morning and at bedtime.  8.   Amiodarone 200 mg twice a day for a week and then 200 mg daily.  Please note, Cardiology will be responsible for all dosing, renewal, and decisions about continuing this medicine.  I produced this regimen reading Dr. Phelan's note from today.    9.   Ecotrin 81 mg daily.  10.   Atorvastatin 40 mg nightly.  Watch for muscle weakness.  11.   Plavix 75 mg daily.    12.   Fluticasone 2 sprays to each nostril daily.  13.   Gabapentin 300 mg twice a day.   14.   Tresiba 5 units nightly.  15.   Prevacid 30 mg per G-tube every morning.  16.   MiraLAX 17 g daily.  17.   Diltiazem 60 mg every 6 hours.    18.   Erythropoietin 10,000 units 3 times a day.  19.   Regular insulin sliding scale.  20.   Lidocaine patch.  21.   Metoprolol 150 mg twice a day.  22.   Pamelor 10 mg nightly per G-tube.  23.   Zenpep as needed to unclog G-Tube.  24.   Senna 17.2 mg nightly.  25.   Simethicone 125 mg four times a day as needed.  26.   Sodium bicarbonate as needed to unclog tube.  27.   Tramadol 50 mg every 12 hours as needed.  Watch for drowsiness.  No alcohol.      RISK OF READMISSION:  High.  TCM followup is recommended.     Dictated By Dee Joyce MD  d: 12/23/2024 17:46:51  t: 12/24/2024 04:05:16  [facility] [id number]  [facility]/    Electronically signed by Dee Joyce MD on 12/24/2024  9:43 AM

## (undated) NOTE — LETTER
[facility]  [address], [place], [place]    Authorization for Surgical Operation and Procedure                               I hereby authorize Carlyn Storey MD, my physician and his/her assistants (if applicable), which may include medical students, residents, and/or fellows, to perform the following surgical operation/ procedure and administer such anesthesia as may be determined necessary by my physician: Operation/Procedure name (s) COLONOSCOPY on Ollie Hernández   2.   I recognize that during the surgical operation/procedure, unforeseen conditions may necessitate additional or different procedures than those listed above.  I, therefore, further authorize and request that the above-named surgeon, assistants, or designees perform such procedures as are, in their judgment, necessary and desirable.    3.   My surgeon/physician has discussed prior to my surgery the potential benefits, risks and side effects of this procedure; the likelihood of achieving goals; and potential problems that might occur during recuperation.  They also discussed reasonable alternatives to the procedure, including risks, benefits, and side effects related to the alternatives and risks related to not receiving this procedure.  I have had all my questions answered and I acknowledge that no guarantee has been made as to the result that may be obtained.    4.   Should the need arise during my operation/procedure, which includes change of level of care prior to discharge, I also consent to the administration of blood and/or blood products.  Further, I understand that despite careful testing and screening of blood or blood products by collecting agencies, I may still be subject to ill effects as a result of receiving a blood transfusion and/or blood products.  The following are some, but not all, of the potential risks that can occur: fever and allergic reactions, hemolytic reactions, transmission of diseases such as  Hepatitis, AIDS and Cytomegalovirus (CMV) and fluid overload.  In the event that I wish to have an autologous transfusion of my own blood, or a directed donor transfusion, I will discuss this with my physician.  Check only if Refusing Blood or Blood Products  I understand refusal of blood or blood products as deemed necessary by my physician may have serious consequences to my condition to include possible death. I hereby assume responsibility for my refusal and release the hospital, its personnel, and my physicians from any responsibility for the consequences of my refusal.    o  Refuse   5.   I authorize the use of any specimen, organs, tissues, body parts or foreign objects that may be removed from my body during the operation/procedure for diagnosis, research or teaching purposes and their subsequent disposal by hospital authorities.  I also authorize the release of specimen test results and/or written reports to my treating physician on the hospital medical staff or other referring or consulting physicians involved in my care, at the discretion of the Pathologist or my treating physician.    6.   I consent to the photographing or videotaping of the operations or procedures to be performed, including appropriate portions of my body for medical, scientific, or educational purposes, provided my identity is not revealed by the pictures or by descriptive texts accompanying them.  If the procedure has been photographed/videotaped, the surgeon will obtain the original picture, image, videotape or CD.  The hospital will not be responsible for storage, release or maintenance of the picture, image, tape or CD.    7.   I consent to the presence of a  or observers in the operating room as deemed necessary by my physician or their designees.    8.   I recognize that in the event my procedure results in extended X-Ray/fluoroscopy time, I may develop a skin reaction.    9. If I have a Do Not Attempt  Resuscitation (DNAR) order in place, that status will be suspended while in the operating room, procedural suite, and during the recovery period unless otherwise explicitly stated by me (or a person authorized to consent on my behalf). The surgeon or my attending physician will determine when the applicable recovery period ends for purposes of reinstating the DNAR order.  10. Patients having a sterilization procedure: I understand that if the procedure is successful the results will be permanent and it will therefore be impossible for me to inseminate, conceive, or bear children.  I also understand that the procedure is intended to result in sterility, although the result has not been guaranteed.   11. I acknowledge that my physician has explained sedation/analgesia administration to me including the risk and benefits I consent to the administration of sedation/analgesia as may be necessary or desirable in the judgment of my physician.    I CERTIFY THAT I HAVE READ AND FULLY UNDERSTAND THE ABOVE CONSENT TO OPERATION and/or OTHER PROCEDURE.     ____________________________________  _________________________________        ______________________________  Signature of Patient    Signature of Responsible Person                Printed Name of Responsible Person                                      ____________________________________  _____________________________                ________________________________  Signature of Witness        Date  Time         Relationship to Patient    STATEMENT OF PHYSICIAN My signature below affirms that prior to the time of the procedure; I have explained to the patient and/or his/her legal representative, the risks and benefits involved in the proposed treatment and any reasonable alternative to the proposed treatment. I have also explained the risks and benefits involved in refusal of the proposed treatment and alternatives to the proposed treatment and have answered the patient's  questions. If I have a significant financial interest in a co-management agreement or a significant financial interest in any product or implant, or other significant relationship used in this procedure/surgery, I have disclosed this and had a discussion with my patient.     _____________________________________________________              _____________________________  (Signature of Physician)                                                                                         (Date)                                   (Time)  Patient Name: Ollie Hernández      : 1945      Printed: 6/15/2024     Medical Record #: [id number]                                      Page 1 of 1

## (undated) NOTE — MR AVS SNAPSHOT
Calixto Rush   2017 2:00 PM   Office Visit   MRN:  [id number]    Description:  Male : 1945   Department:  [address] [place] [address] - [facility]              Visit Summary      Primary Visit Diagnosis     Anemia in chronic kid [address] [facility]   [place] [address]       Tuesday April 25, 2017 8:40 AM     Appointment with Joann Child at [facility] ([phone])   [facility] [address]            [facility]

## (undated) NOTE — LETTER
[facility]  Administration of Anesthesia  IOllie agree to be cared for by a physician anesthesiologist alone and/or with a nurse anesthetist, who is specially trained to monitor me and give me medicine to put me to sleep or keep me comfortable during my procedure    I understand that my anesthesiologist and/or anesthetist is not an employee or agent of [facility] or [employer]. He or she works for [facility]    As the patient asking for anesthesia services, I agree to:  Allow the anesthesiologist (anesthesia doctor) to give me medicine and do additional procedures as necessary. Some examples are: Starting or using an “IV” to give me medicine, fluids or blood during my procedure, and having a breathing tube placed to help me breathe when I’m asleep (intubation). In the event that my heart stops working properly, I understand that my anesthesiologist will make every effort to sustain my life, unless otherwise directed by [facility] Do Not Resuscitate documents.  Tell my anesthesia doctor before my procedure:  If I am pregnant.  The last time that I ate or drank.  iii. All of the medicines I take (including prescriptions, herbal supplements, and pills I can buy without a prescription (including street drugs/illegal medications). Failure to inform my anesthesiologist about these medicines may increase my risk of anesthetic complications.  iv.If I am allergic to anything or have had a reaction to anesthesia before.  I understand how the anesthesia medicine will help me (benefits).  I understand that with any type of anesthesia medicine there are risks:  The most common risks are: nausea, vomiting, sore throat, muscle soreness, damage to my eyes, mouth, or teeth (from breathing tube placement).  Rare risks include: remembering what happened during my procedure, allergic reactions to medications, injury to my airway, heart, lungs, vision, nerves, or  muscles and in extremely rare instances death.  My doctor has explained to me other choices available to me for my care (alternatives).  Pregnant Patients (“epidural”):  I understand that the risks of having an epidural (medicine given into my back to help control pain during labor), include itching, low blood pressure, difficulty urinating, headache or slowing of the baby’s heart. Very rare risks include infection, bleeding, seizure, irregular heart rhythms and nerve injury.  Regional Anesthesia (“spinal”, “epidural”, & “nerve blocks”):  I understand that rare but potential complications include headache, bleeding, infection, seizure, irregular heart rhythms, and nerve injury.    _____________________________________________________________________________  Patient (or Representative) Signature/Relationship to Patient  Date   Time    _____________________________________________________________________________   Name (if used)    Language/Organization   Time    _____________________________________________________________________________  Nurse Anesthetist Signature     Date   Time  _____________________________________________________________________________  Anesthesiologist Signature     Date   Time  I have discussed the procedure and information above with the patient (or patient’s representative) and answered their questions. The patient or their representative has agreed to have anesthesia services.    _____________________________________________________________________________  Witness        Date   Time  I have verified that the signature is that of the patient or patient’s representative, and that it was signed before the procedure  Patient Name: Ollie Hernández     : 1945                 Printed: 2024 at 12:02 PM    Medical Record #: [id number]                                            Page 1 of 1  ----------ANESTHESIA CONSENT----------

## (undated) NOTE — LETTER
[address], [place], [place]    Authorization for Surgical Operation and Procedure                               I hereby authorize Yash Sheppard MD, my physician and his/her assistants (if applicable), which may include medical students, residents, and/or fellows, to perform the following surgical operation/ procedure and administer such anesthesia as may be determined necessary by my physician: Operation/Procedure name (s) ESOPHAGOGASTRODUODENOSCOPY (EGD) and PERCUTANEOUS GASTROSTOMY TUBE PLACEMENT  on Ollie Hernández   2.   I recognize that during the surgical operation/procedure, unforeseen conditions may necessitate additional or different procedures than those listed above.  I, therefore, further authorize and request that the above-named surgeon, assistants, or designees perform such procedures as are, in their judgment, necessary and desirable.    3.   My surgeon/physician has discussed prior to my surgery the potential benefits, risks and side effects of this procedure; the likelihood of achieving goals; and potential problems that might occur during recuperation.  They also discussed reasonable alternatives to the procedure, including risks, benefits, and side effects related to the alternatives and risks related to not receiving this procedure.  I have had all my questions answered and I acknowledge that no guarantee has been made as to the result that may be obtained.    4.   Should the need arise during my operation/procedure, which includes change of level of care prior to discharge, I also consent to the administration of blood and/or blood products.  Further, I understand that despite careful testing and screening of blood or blood products by collecting agencies, I may still be subject to ill effects as a result of receiving a blood transfusion and/or blood products.  The following are some, but not all, of the potential risks that can occur: fever and allergic  reactions, hemolytic reactions, transmission of diseases such as Hepatitis, AIDS and Cytomegalovirus (CMV) and fluid overload.  In the event that I wish to have an autologous transfusion of my own blood, or a directed donor transfusion, I will discuss this with my physician.  Check only if Refusing Blood or Blood Products  I understand refusal of blood or blood products as deemed necessary by my physician may have serious consequences to my condition to include possible death. I hereby assume responsibility for my refusal and release the hospital, its personnel, and my physicians from any responsibility for the consequences of my refusal.    o  Refuse   5.   I authorize the use of any specimen, organs, tissues, body parts or foreign objects that may be removed from my body during the operation/procedure for diagnosis, research or teaching purposes and their subsequent disposal by hospital authorities.  I also authorize the release of specimen test results and/or written reports to my treating physician on the hospital medical staff or other referring or consulting physicians involved in my care, at the discretion of the Pathologist or my treating physician.    6.   I consent to the photographing or videotaping of the operations or procedures to be performed, including appropriate portions of my body for medical, scientific, or educational purposes, provided my identity is not revealed by the pictures or by descriptive texts accompanying them.  If the procedure has been photographed/videotaped, the surgeon will obtain the original picture, image, videotape or CD.  The hospital will not be responsible for storage, release or maintenance of the picture, image, tape or CD.    7.   I consent to the presence of a  or observers in the operating room as deemed necessary by my physician or their designees.    8.   I recognize that in the event my procedure results in extended X-Ray/fluoroscopy time, I may  develop a skin reaction.    9. If I have a Do Not Attempt Resuscitation (DNAR) order in place, that status will be suspended while in the operating room, procedural suite, and during the recovery period unless otherwise explicitly stated by me (or a person authorized to consent on my behalf). The surgeon or my attending physician will determine when the applicable recovery period ends for purposes of reinstating the DNAR order.  10. Patients having a sterilization procedure: I understand that if the procedure is successful the results will be permanent and it will therefore be impossible for me to inseminate, conceive, or bear children.  I also understand that the procedure is intended to result in sterility, although the result has not been guaranteed.   11. I acknowledge that my physician has explained sedation/analgesia administration to me including the risk and benefits I consent to the administration of sedation/analgesia as may be necessary or desirable in the judgment of my physician.    I CERTIFY THAT I HAVE READ AND FULLY UNDERSTAND THE ABOVE CONSENT TO OPERATION and/or OTHER PROCEDURE.     ____________________________________  _________________________________        ______________________________  Signature of Patient    Signature of Responsible Person                Printed Name of Responsible Person                                      ____________________________________  _____________________________                ________________________________  Signature of Witness        Date  Time         Relationship to Patient    STATEMENT OF PHYSICIAN My signature below affirms that prior to the time of the procedure; I have explained to the patient and/or his/her legal representative, the risks and benefits involved in the proposed treatment and any reasonable alternative to the proposed treatment. I have also explained the risks and benefits involved in refusal of the proposed treatment and alternatives to  the proposed treatment and have answered the patient's questions. If I have a significant financial interest in a co-management agreement or a significant financial interest in any product or implant, or other significant relationship used in this procedure/surgery, I have disclosed this and had a discussion with my patient.     _____________________________________________________              _____________________________  (Signature of Physician)                                                                                         (Date)                                   (Time)  Patient Name: Ollie Hernández      : 1945      Printed: 10/19/2024     Medical Record #: [id number]                                      Page 1 of 1

## (undated) NOTE — MR AVS SNAPSHOT
Gerardo Pierson   2017 10:30 AM   Office Visit   MRN:  [id number]    Description:  Male : 1945   Department:  [facility]              Visit Summary      Primary Visit Diagnosis     Anemia in chronic kid

## (undated) NOTE — LETTER
November 8, 2024    Ollie Hernández  [address]  [facility] [address]      Dear Ollie:  It was a pleasure speaking with your wife over the phone recently. I wanted to send you my contact information for you to utilize when you have a question and or need some assistance.  I am available to provide you with the support and education needed to keep you healthy.   Together we will work on:  Coordination of care: helping to reduce duplicate orders/tests to save you time and money  Medications: review, educate, and discuss any concerns or issues you may have  Personalized education and support regarding your health.  I look forward to working with you,  Britany Loyola, [facility]  Britany Loyola  Health  Care Managment Population Health  [phone] [email or web address]    Friendly reminder - 2025 Benefits Open Enrollment is here!   We encourage you to review your current Medicare coverage and explore your options during this period. We have developed a Medicare Information Page on our website to serve as a resource for guidance and answers to any questions you might have.   You can access it by visiting, [email or web address]

## (undated) NOTE — MR AVS SNAPSHOT
Mary Tony   2017 1:30 PM   Nurse Only   MRN:  [id number]    Description:  Male : 1945   Department:  [address] [place] [address]              Visit Summary      Primary Visit Diagnosis     Chronic kidney disease, Call the [employer] for assistance with your inactive [employer] account    If you have questions, you can call [phone] to talk to our [facility] Staff. Remember, [employer] is NOT to be used for urgent needs. For medical emergencies, dial 911.     Vi

## (undated) NOTE — Clinical Note
[facility]  Administration of Anesthesia  1. I, Ollie Hernández agree to be cared for by a physician anesthesiologist alone and/or with a nurse anesthetist, who is specially trained to monitor me and give me medicine to put me to sleep or keep me comfortable during my procedure    I understand that my anesthesiologist and/or anesthetist is not an employee or agent of [facility] or [employer]. He or she works for [facility]    2. As the patient asking for anesthesia services, I agree to:  a. Allow the anesthesiologist (anesthesia doctor) to give me medicine and do additional procedures as necessary. Some examples are: Starting or using an “IV” to give me medicine, fluids or blood during my procedure, and having a breathing tube placed to help me breathe when I’m asleep (intubation). In the event that my heart stops working properly, I understand that my anesthesiologist will make every effort to sustain my life, unless otherwise directed by [facility] Do Not Resuscitate documents.  b. Tell my anesthesia doctor before my procedure:  i. If I am pregnant.  ii. The last time that I ate or drank.  iii. All of the medicines I take (including prescriptions, herbal supplements, and pills I can buy without a prescription (including street drugs/illegal medications). Failure to inform my anesthesiologist about these medicines may increase my risk of anesthetic complications.  iv.If I am allergic to anything or have had a reaction to anesthesia before.  3. I understand how the anesthesia medicine will help me (benefits).  4. I understand that with any type of anesthesia medicine there are risks:  a. The most common risks are: nausea, vomiting, sore throat, muscle soreness, damage to my eyes, mouth, or teeth (from breathing tube placement).  b. Rare risks include: remembering what happened during my procedure, allergic reactions to medications, injury to my airway,  heart, lungs, vision, nerves, or muscles and in extremely rare instances death.  5. My doctor has explained to me other choices available to me for my care (alternatives).  6. Pregnant Patients (“epidural”):  I understand that the risks of having an epidural (medicine given into my back to help control pain during labor), include itching, low blood pressure, difficulty urinating, headache or slowing of the baby’s heart. Very rare risks include infection, bleeding, seizure, irregular heart rhythms and nerve injury.  7. Regional Anesthesia (“spinal”, “epidural”, & “nerve blocks”):  I understand that rare but potential complications include headache, bleeding, infection, seizure, irregular heart rhythms, and nerve injury.    _____________________________________________________________________________  Patient (or Representative) Signature/Relationship to Patient  Date   Time    _____________________________________________________________________________   Name (if used)    Language/Organization   Time    _____________________________________________________________________________  Nurse Anesthetist Signature     Date   Time  _____________________________________________________________________________  Anesthesiologist Signature     Date   Time  I have discussed the procedure and information above with the patient (or patient’s representative) and answered their questions. The patient or their representative has agreed to have anesthesia services.    _____________________________________________________________________________  Witness        Date   Time  I have verified that the signature is that of the patient or patient’s representative, and that it was signed before the procedure  Patient Name: Ollie Hernández     : 1945                 Printed: 2024 at 11:58 AM    Medical Record #: [id number]                                            Page 1 of 1  ----------ANESTHESIA  CONSENT----------

## (undated) NOTE — ED AVS SNAPSHOT
[facility] Emergency Department    [place] [address]     [address] [place] [address]    Phone:  [phone]    Fax:  [phone]           Chay Farrell   MRN: [id number]    Department:  [facility] Emergency Department   Date of Visit:  2/ and Class Registration line at [phone] or find a doctor online by visiting [email or web address].    IF THERE IS ANY CHANGE OR WORSENING OF YOUR CONDITION, CALL YOUR PRIMARY CARE PHYSICIAN AT ONCE OR RETURN IMMEDIATELY TO [address] [place] [address].     If

## (undated) NOTE — ED AVS SNAPSHOT
[facility] Emergency Department    [place] [address]    Phone:  [phone]    Fax:  [phone]           Daryl Roman   MRN: [id number]    Department:  [facility] Emergency Department   Date of Visit:  2/ 02/09/2017  15:43 ondansetron (ZOFRAN-ODT) disintegrating tab 4 mg 4 mg                     Medication Information       Follow the directions for taking your medications provided by your doctor.  Please ask your health care provider, pharma to serve you. You are our top priority. You were examined and treated today on an urgent basis only. This was not a substitute for ongoing medical care. Often, one Emergency Department visit does not uncover every injury or illness.  If you have been r [facility]        Pharmacy Address Phone Number   [facility] [address] [facility] ([address]) [address] [facility] [place] [address]) [phone]   [facility] [address]  ([address] [facility]) [phone]

## (undated) NOTE — Clinical Note
CHRISTIN, HFU call made, see [facility] notes. [facility] Attempted to schedule PCP office HFU non-TCM appointment with patient, Ollie states he will call Dr Parmar to see if he can have his HFU the same day as his MASV. Message sent to MD's office.  Patient will need a medication reconciliation.

## (undated) NOTE — LETTER
[facility]  Administration of Anesthesia  IOllie agree to be cared for by a physician anesthesiologist alone and/or with a nurse anesthetist, who is specially trained to monitor me and give me medicine to put me to sleep or keep me comfortable during my procedure    I understand that my anesthesiologist and/or anesthetist is not an employee or agent of [facility] or [email or web address]. He or she works for [facility]    As the patient asking for anesthesia services, I agree to:  Allow the anesthesiologist (anesthesia doctor) to give me medicine and do additional procedures as necessary. Some examples are: Starting or using an “IV” to give me medicine, fluids or blood during my procedure, and having a breathing tube placed to help me breathe when I’m asleep (intubation). In the event that my heart stops working properly, I understand that my anesthesiologist will make every effort to sustain my life, unless otherwise directed by [facility] Do Not Resuscitate documents.  Tell my anesthesia doctor before my procedure:  If I am pregnant.  The last time that I ate or drank.  iii. All of the medicines I take (including prescriptions, herbal supplements, and pills I can buy without a prescription (including street drugs/illegal medications). Failure to inform my anesthesiologist about these medicines may increase my risk of anesthetic complications.  iv.If I am allergic to anything or have had a reaction to anesthesia before.  I understand how the anesthesia medicine will help me (benefits).  I understand that with any type of anesthesia medicine there are risks:  The most common risks are: nausea, vomiting, sore throat, muscle soreness, damage to my eyes, mouth, or teeth (from breathing tube placement).  Rare risks include: remembering what happened during my procedure, allergic reactions to medications, injury to my airway, heart, lungs, vision, nerves, or  muscles and in extremely rare instances death.  My doctor has explained to me other choices available to me for my care (alternatives).  Pregnant Patients (“epidural”):  I understand that the risks of having an epidural (medicine given into my back to help control pain during labor), include itching, low blood pressure, difficulty urinating, headache or slowing of the baby’s heart. Very rare risks include infection, bleeding, seizure, irregular heart rhythms and nerve injury.  Regional Anesthesia (“spinal”, “epidural”, & “nerve blocks”):  I understand that rare but potential complications include headache, bleeding, infection, seizure, irregular heart rhythms, and nerve injury.    _____________________________________________________________________________  Patient (or Representative) Signature/Relationship to Patient  Date   Time    _____________________________________________________________________________   Name (if used)    Language/Organization   Time    _____________________________________________________________________________  Nurse Anesthetist Signature     Date   Time  _____________________________________________________________________________  Anesthesiologist Signature     Date   Time  I have discussed the procedure and information above with the patient (or patient’s representative) and answered their questions. The patient or their representative has agreed to have anesthesia services.    _____________________________________________________________________________  Witness        Date   Time  I have verified that the signature is that of the patient or patient’s representative, and that it was signed before the procedure  Patient Name: Ollie Hernández     : 1945                 Printed: 6/3/2024 at 7:47 AM    Medical Record #: [id number]                                            Page 1 of 1  ----------ANESTHESIA CONSENT----------

## (undated) NOTE — MR AVS SNAPSHOT
Jeffry Blackmon   2017 9:30 AM   Nurse Only   MRN:  [id number]    Description:  Male : 1945   Department:  [address] [place] [address] - [facility]              Visit Summary      Primary Visit Diagnosis     Chronic kidney disease, Iron 45    mcg/dL Final    Total Iron Binding Capacity 240  228-428  mcg/dL Final    Iron Saturation 19 (L) 20-55  % Final    Transferrin 182  180-329  mg/dL Final         Result Summary for HEMOGLOBIN + HEMATOCRIT      Component Results     Compone

## (undated) NOTE — LETTER
[place] [address]  Authorization for Invasive Procedures  Date: 6/2/2024           Time: 1200    I hereby authorize Esophagogastroduodenoscopy and colonoscopy with possible biopsy,polypectomy and control of bleeding, my physician and his/her assistants (if applicable), which may include medical students, residents, and/or fellows, to perform the following surgical operation/ procedure and administer such anesthesia as may be determined necessary by my physician: Gabriel Saldana MD on Ollie Hernández  2.   I recognize that during the surgical operation/procedure, unforeseen conditions may necessitate additional or different procedures than those listed above.  I, therefore, further authorize and request that the above-named surgeon, assistants, or designees perform such procedures as are, in their judgment, necessary and desirable.    3.   My surgeon/physician has discussed prior to my surgery the potential benefits, risks and side effects of this procedure; the likelihood of achieving goals; and potential problems that might occur during recuperation.  They also discussed reasonable alternatives to the procedure, including risks, benefits, and side effects related to the alternatives and risks related to not receiving this procedure.  I have had all my questions answered and I acknowledge that no guarantee has been made as to the result that may be obtained.    4.   Should the need arise during my operation/procedure, which includes change of level of care prior to discharge, I also consent to the administration of blood and/or blood products.  Further, I understand that despite careful testing and screening of blood or blood products by collecting agencies, I may still be subject to ill effects as a result of receiving a blood transfusion and/or blood products.  The following are some, but not all, of the potential risks that can occur: fever and allergic reactions, hemolytic  reactions, transmission of diseases such as Hepatitis, AIDS and Cytomegalovirus (CMV) and fluid overload.  In the event that I wish to have an autologous transfusion of my own blood, or a directed donor transfusion, I will discuss this with my physician.   Check only if Refusing Blood or Blood Products  I understand refusal of blood or blood products as deemed necessary by my physician may have serious consequences to my condition to include possible death. I hereby assume responsibility for my refusal and release the hospital, its personnel, and my physicians from any responsibility for the consequences of my refusal.         o  Refuse         5.   I authorize the use of any specimen, organs, tissues, body parts or foreign objects that may be removed from my body during the operation/procedure for diagnosis, research or teaching purposes and their subsequent disposal by hospital authorities.  I also authorize the release of specimen test results and/or written reports to my treating physician on the hospital medical staff or other referring or consulting physicians involved in my care, at the discretion of the Pathologist or my treating physician.    6.   I consent to the photographing or videotaping of the operations or procedures to be performed, including appropriate portions of my body for medical, scientific, or educational purposes, provided my identity is not revealed by the pictures or by descriptive texts accompanying them.  If the procedure has been photographed/videotaped, the surgeon will obtain the original picture, image, videotape or CD.  The hospital will not be responsible for storage, release or maintenance of the picture, image, tape or CD.    7.   I consent to the presence of a  or observers in the operating room as deemed necessary by my physician or their designees.    8.   I recognize that in the event my procedure results in extended X-Ray/fluoroscopy time, I may develop a  skin reaction.    9. If I have a Do Not Attempt Resuscitation (DNAR) order in place, that status will be suspended while in the operating room, procedural suite, and during the recovery period unless otherwise explicitly stated by me (or a person authorized to consent on my behalf). The surgeon or my attending physician will determine when the applicable recovery period ends for purposes of reinstating the DNAR order.  10. Patients having a sterilization procedure: I understand that if the procedure is successful the results will be permanent and it will therefore be impossible for me to inseminate, conceive, or bear children.  I also understand that the procedure is intended to result in sterility, although the result has not been guaranteed.   11. I acknowledge that my physician has explained sedation/analgesia administration to me including the risk and benefits I consent to the administration of sedation/analgesia as may be necessary or desirable in the judgment of my physician.    I CERTIFY THAT I HAVE READ AND FULLY UNDERSTAND THE ABOVE CONSENT TO OPERATION and/or OTHER PROCEDURE.        ____________________________________       _________________________________      ______________________________  Signature of Patient         Signature of Responsible Person        Printed Name of Responsible Person        ____________________________________      _________________________________      ______________________________       Signature of Witness          Relationship to Patient                       Date                                       Time  Patient Name: Ollie Hernández  : 1945    Reviewed: 2024   Printed: 2024  Medical Record #: [id number] Page 1 of 2             STATEMENT OF PHYSICIAN My signature below affirms that prior to the time of the procedure; I have explained to the patient and/or his/her legal representative, the risks and benefits involved in the proposed treatment and  any reasonable alternative to the proposed treatment. I have also explained the risks and benefits involved in refusal of the proposed treatment and alternatives to the proposed treatment and have answered the patient's questions. If I have a significant financial interest in a co-management agreement or a significant financial interest in any product or implant, or other significant relationship used in this procedure/surgery, I have disclosed this and had a discussion with my patient.     _______________________________________________________________ _____________________________  (Signature of Physician)                                                                                         (Date)                                   (Time)  Patient Name: Ollie Hernández  : 1945    Reviewed: 2024   Printed: 2024  Medical Record #: [id number] Page 2 of 2

## (undated) NOTE — MR AVS SNAPSHOT
Christina Lin   2017 10:00 AM   Nurse Only   MRN:  [id number]    Description:  Male : 1945   Department:  [address] [place] [address] - [facility]              Visit Summary      Primary Visit Diagnosis     Chronic kidney disease, [address]            Result Summary for HEMOGLOBIN + HEMATOCRIT      Component Results     Component Value Flag Ref Range Units Status    HGB 11.0 (L) 13.5-17.5  g/dL Final    HCT 32.8 (L) 41.0-52.0  % Final